# Patient Record
Sex: FEMALE | Race: WHITE | NOT HISPANIC OR LATINO | Employment: OTHER | URBAN - METROPOLITAN AREA
[De-identification: names, ages, dates, MRNs, and addresses within clinical notes are randomized per-mention and may not be internally consistent; named-entity substitution may affect disease eponyms.]

---

## 2017-05-09 ENCOUNTER — HOSPITAL ENCOUNTER (EMERGENCY)
Facility: HOSPITAL | Age: 77
Discharge: HOME | End: 2017-05-09
Attending: EMERGENCY MEDICINE
Payer: MEDICARE

## 2017-05-09 VITALS
SYSTOLIC BLOOD PRESSURE: 165 MMHG | TEMPERATURE: 98 F | WEIGHT: 150 LBS | RESPIRATION RATE: 16 BRPM | HEART RATE: 82 BPM | HEIGHT: 61 IN | DIASTOLIC BLOOD PRESSURE: 70 MMHG | OXYGEN SATURATION: 95 % | BODY MASS INDEX: 28.32 KG/M2

## 2017-05-09 DIAGNOSIS — R10.9 ABDOMINAL PAIN: ICD-10-CM

## 2017-05-09 LAB
ALBUMIN SERPL BCP-MCNC: 3.8 G/DL
ALP SERPL-CCNC: 95 U/L
ALT SERPL W/O P-5'-P-CCNC: 17 U/L
ANION GAP SERPL CALC-SCNC: 10 MMOL/L
AST SERPL-CCNC: 18 U/L
BASOPHILS # BLD AUTO: 0.05 K/UL
BASOPHILS NFR BLD: 0.4 %
BILIRUB SERPL-MCNC: 0.4 MG/DL
BILIRUB UR QL STRIP: NEGATIVE
BUN SERPL-MCNC: 17 MG/DL
CALCIUM SERPL-MCNC: 9.6 MG/DL
CHLORIDE SERPL-SCNC: 106 MMOL/L
CLARITY UR: CLEAR
CO2 SERPL-SCNC: 26 MMOL/L
COLOR UR: YELLOW
CREAT SERPL-MCNC: 0.8 MG/DL
DIFFERENTIAL METHOD: ABNORMAL
EOSINOPHIL # BLD AUTO: 0.2 K/UL
EOSINOPHIL NFR BLD: 1.5 %
ERYTHROCYTE [DISTWIDTH] IN BLOOD BY AUTOMATED COUNT: 13.6 %
EST. GFR  (AFRICAN AMERICAN): >60 ML/MIN/1.73 M^2
EST. GFR  (NON AFRICAN AMERICAN): >60 ML/MIN/1.73 M^2
GLUCOSE SERPL-MCNC: 104 MG/DL
GLUCOSE UR QL STRIP: NEGATIVE
HCT VFR BLD AUTO: 41.4 %
HGB BLD-MCNC: 14 G/DL
HGB UR QL STRIP: ABNORMAL
KETONES UR QL STRIP: NEGATIVE
LACTATE SERPL-SCNC: 1.2 MMOL/L
LEUKOCYTE ESTERASE UR QL STRIP: NEGATIVE
LIPASE SERPL-CCNC: 40 U/L
LYMPHOCYTES # BLD AUTO: 2.4 K/UL
LYMPHOCYTES NFR BLD: 21.4 %
MCH RBC QN AUTO: 29.8 PG
MCHC RBC AUTO-ENTMCNC: 33.8 %
MCV RBC AUTO: 88 FL
MONOCYTES # BLD AUTO: 1.6 K/UL
MONOCYTES NFR BLD: 13.6 %
NEUTROPHILS # BLD AUTO: 7.2 K/UL
NEUTROPHILS NFR BLD: 63.1 %
NITRITE UR QL STRIP: NEGATIVE
PH UR STRIP: 6 [PH] (ref 5–8)
PLATELET # BLD AUTO: 254 K/UL
PMV BLD AUTO: 9.5 FL
POTASSIUM SERPL-SCNC: 3.8 MMOL/L
PROT SERPL-MCNC: 7.4 G/DL
PROT UR QL STRIP: ABNORMAL
RBC # BLD AUTO: 4.7 M/UL
SODIUM SERPL-SCNC: 142 MMOL/L
SP GR UR STRIP: 1.02 (ref 1–1.03)
TROPONIN I SERPL DL<=0.01 NG/ML-MCNC: 0.01 NG/ML
URN SPEC COLLECT METH UR: ABNORMAL
UROBILINOGEN UR STRIP-ACNC: NEGATIVE EU/DL
WBC # BLD AUTO: 11.39 K/UL

## 2017-05-09 PROCEDURE — 96374 THER/PROPH/DIAG INJ IV PUSH: CPT

## 2017-05-09 PROCEDURE — 96375 TX/PRO/DX INJ NEW DRUG ADDON: CPT

## 2017-05-09 PROCEDURE — 93010 ELECTROCARDIOGRAM REPORT: CPT | Mod: ,,, | Performed by: INTERNAL MEDICINE

## 2017-05-09 PROCEDURE — 83690 ASSAY OF LIPASE: CPT

## 2017-05-09 PROCEDURE — 93005 ELECTROCARDIOGRAM TRACING: CPT

## 2017-05-09 PROCEDURE — 84484 ASSAY OF TROPONIN QUANT: CPT

## 2017-05-09 PROCEDURE — 80053 COMPREHEN METABOLIC PANEL: CPT

## 2017-05-09 PROCEDURE — 63600175 PHARM REV CODE 636 W HCPCS: Performed by: EMERGENCY MEDICINE

## 2017-05-09 PROCEDURE — 85025 COMPLETE CBC W/AUTO DIFF WBC: CPT

## 2017-05-09 PROCEDURE — 83605 ASSAY OF LACTIC ACID: CPT

## 2017-05-09 PROCEDURE — 25500020 PHARM REV CODE 255: Performed by: EMERGENCY MEDICINE

## 2017-05-09 PROCEDURE — 99285 EMERGENCY DEPT VISIT HI MDM: CPT | Mod: 25

## 2017-05-09 PROCEDURE — 81003 URINALYSIS AUTO W/O SCOPE: CPT

## 2017-05-09 RX ORDER — ONDANSETRON 2 MG/ML
INJECTION INTRAMUSCULAR; INTRAVENOUS
Status: DISPENSED
Start: 2017-05-09 | End: 2017-05-10

## 2017-05-09 RX ORDER — MORPHINE SULFATE 2 MG/ML
INJECTION, SOLUTION INTRAMUSCULAR; INTRAVENOUS
Status: DISPENSED
Start: 2017-05-09 | End: 2017-05-10

## 2017-05-09 RX ORDER — HYDROCODONE BITARTRATE AND ACETAMINOPHEN 7.5; 325 MG/1; MG/1
1 TABLET ORAL EVERY 4 HOURS PRN
Qty: 12 TABLET | Refills: 0 | Status: SHIPPED | OUTPATIENT
Start: 2017-05-09 | End: 2017-06-08

## 2017-05-09 RX ORDER — ONDANSETRON 2 MG/ML
4 INJECTION INTRAMUSCULAR; INTRAVENOUS
Status: COMPLETED | OUTPATIENT
Start: 2017-05-09 | End: 2017-05-09

## 2017-05-09 RX ORDER — MORPHINE SULFATE 2 MG/ML
2 INJECTION, SOLUTION INTRAMUSCULAR; INTRAVENOUS
Status: COMPLETED | OUTPATIENT
Start: 2017-05-09 | End: 2017-05-09

## 2017-05-09 RX ORDER — ONDANSETRON 4 MG/1
4 TABLET, FILM COATED ORAL EVERY 8 HOURS PRN
Qty: 12 TABLET | Refills: 0 | Status: SHIPPED | OUTPATIENT
Start: 2017-05-09 | End: 2017-05-25

## 2017-05-09 RX ADMIN — IOHEXOL 100 ML: 350 INJECTION, SOLUTION INTRAVENOUS at 09:05

## 2017-05-09 RX ADMIN — ONDANSETRON 4 MG: 2 INJECTION INTRAMUSCULAR; INTRAVENOUS at 08:05

## 2017-05-09 RX ADMIN — MORPHINE SULFATE 2 MG: 2 INJECTION, SOLUTION INTRAMUSCULAR; INTRAVENOUS at 08:05

## 2017-05-09 NOTE — ED AVS SNAPSHOT
[facility] [address] [place] [address]               Leslie Wood   2017  7:53 PM   ED    Description:  Female : 1940   Department:  [facility]           Your Care was Coordinated By:     Provider Role From To    Saira Jimenez MD Attending Provider 17 --      Reason for Visit     Abdominal Pain           Diagnoses this Visit        Comments    Abdominal pain         Abdominal pain         Abdominal pain           ED Disposition     ED Disposition Condition Comment    Discharge             To Do List           Follow-up Information     Follow up with Adi Almeida MD In 1 day.    Specialty:  Internal Medicine    Contact information:    [address] [facility] [id number] [phone]          Follow up with [facility].    Specialty:  Emergency Medicine    Why:  As needed, If symptoms worsen    Contact information:    [address] [place] [address] [phone]       These Medications        Disp Refills Start End    hydrocodone-acetaminophen 7.5-325mg (NORCO) 7.5-325 mg per tablet 12 tablet 0 2017     Take 1 tablet by mouth every 4 (four) hours as needed for Pain. - Oral    ondansetron (ZOFRAN) 4 MG tablet 12 tablet 0 2017     Take 1 tablet (4 mg total) by mouth every 8 (eight) hours as needed for Nausea. - Oral      [facility]     [facility] Care [facility] - 24/7 Assistance  Unless otherwise directed by your provider, please contact [facility], our nurse care line that is available for 24/7 assistance.     Registered nurses in the [facility] provide: appointment scheduling, clinical advisement, health education, and other advisory services.  Call: [phone] (toll free)               Medications           Message regarding Medications     Verify the changes and/or additions to your medication regime listed below are the  same as discussed with your clinician today.  If any of these changes or additions are incorrect, please notify your healthcare provider.        START taking these NEW medications        Refills    hydrocodone-acetaminophen 7.5-325mg (NORCO) 7.5-325 mg per tablet 0    Sig: Take 1 tablet by mouth every 4 (four) hours as needed for Pain.    Class: Print    Route: Oral    ondansetron (ZOFRAN) 4 MG tablet 0    Sig: Take 1 tablet (4 mg total) by mouth every 8 (eight) hours as needed for Nausea.    Class: Print    Route: Oral      These medications were administered today        Dose Freq    morphine injection 2 mg 2 mg ED 1 Time    Sig: Inject 1 mL (2 mg total) into the vein ED 1 Time.    Class: Normal    Route: Intravenous    ondansetron injection 4 mg 4 mg ED 1 Time    Sig: Inject 4 mg into the vein ED 1 Time.    Class: Normal    Route: Intravenous    omnipaque 350 iohexol 100 mL 100 mL IMG once as needed    Sig: Inject 100 mLs into the vein ONCE PRN for contrast.    Class: Normal    Route: Intravenous      STOP taking these medications     hydrocodone-acetaminophen 10-325mg (NORCO)  mg Tab Take by mouth.           Verify that the below list of medications is an accurate representation of the medications you are currently taking.  If none reported, the list may be blank. If incorrect, please contact your healthcare provider. Carry this list with you in case of emergency.           Current Medications     hydrocodone-acetaminophen 7.5-325mg (NORCO) 7.5-325 mg per tablet Take 1 tablet by mouth every 4 (four) hours as needed for Pain.    lisinopril (PRINIVIL,ZESTRIL) 20 MG tablet Take 20 mg by mouth once daily.    methocarbamol (ROBAXIN) 750 MG Tab Take 500 mg by mouth 4 (four) times daily.    ondansetron (ZOFRAN) 4 MG tablet Take 1 tablet (4 mg total) by mouth every 8 (eight) hours as needed for Nausea.    tizanidine (ZANAFLEX) 4 MG tablet Take 4 mg by mouth every 6 (six) hours as needed.           Clinical  "Reference Information           Your Vitals Were     BP Pulse Temp Resp Height Weight    153/61 80 98.1 °F (36.7 °C) (Oral) 13 5' 1" (1.549 m) 68 kg (150 lb)    SpO2 BMI             94% 28.34 kg/m2         Allergies as of 5/9/2017        Reactions    Percocet [Oxycodone-acetaminophen] Other (See Comments)    Seizures    Codeine Nausea Only, Rash      Immunizations Administered on Date of Encounter - 5/9/2017     None      ED Micro, Lab, POCT     Start Ordered       Status Ordering Provider    05/09/17 2015 05/09/17 2014  CBC auto differential  STAT      Final result     05/09/17 2015 05/09/17 2014  Comprehensive metabolic panel  STAT      Final result     05/09/17 2015 05/09/17 2014  Lactic acid, plasma  STAT      Final result     05/09/17 2015 05/09/17 2014  Urinalysis  STAT      Final result     05/09/17 2015 05/09/17 2014  Troponin I  STAT      Final result     05/09/17 2015 05/09/17 2014  Lipase  STAT      Final result       ED Imaging Orders     Start Ordered       Status Ordering Provider    05/09/17 2124 05/09/17 2124  CTA Abdomen  1 time imaging      Final result     05/09/17 2015 05/09/17 2014  X-Ray Chest 1 View  1 time imaging      Final result     05/09/17 2015 05/09/17 2014  X-Ray Abdomen Flat And Erect  1 time imaging      Final result         Discharge Instructions         Abdominal Pain    Abdominal pain is pain in the stomach or belly area. Everyone has this pain from time to time. In many cases it goes away on its own. But abdominal pain can sometimes be due to a serious problem, such as appendicitis. So its important to know when to seek help.  Causes of abdominal pain  There are many possible causes of abdominal pain. Common causes in adults include:  · Constipation, diarrhea, or gas  · Stomach acid flowing back up into the esophagus (acid reflux or heartburn)  · Severe acid reflux, called GERD (gastroesophageal reflux disease)  · A sore in the lining of the stomach or small intestine (peptic " ulcer)  · Inflammation of the gallbladder, liver, or pancreas  · Gallstones or kidney stones  · Appendicitis   · Intestinal blockage   · An internal organ pushing through a muscle or other tissue (hernia)  · Urinary tract infections  · In women, menstrual cramps, fibroids, or endometriosis  · Inflammation or infection of the intestines  Diagnosing the cause of abdominal pain  Your healthcare provider will do a physical exam help find the cause of your pain. If needed, tests will be ordered. Belly pain has many possible causes. So it can be hard to find the reason for your pain. Giving details about your pain can help. Tell your provider where and when you feel the pain, and what makes it better or worse. Also let your provider know if you have other symptoms such as:  · Fever  · Tiredness  · Upset stomach (nausea)  · Vomiting  · Changes in bathroom habits  Treating abdominal pain  Some causes of pain need emergency medical treatment right away. These include appendicitis or a bowel blockage. Other problems can be treated with rest, fluids, or medicines. Your healthcare provider can give you specific instructions for treatment or self-care based on what is causing your pain.  If you have vomiting or diarrhea, sip water or other clear fluids. When you are ready to eat solid foods again, start with small amounts of easy-to-digest, low-fat foods. These include apple sauce, toast, or crackers.   When to seek medical care  Call 911 or go to the hospital right away if you:  · Cant pass stool and are vomiting  · Are vomiting blood or have bloody diarrhea or black, tarry diarrhea  · Have chest, neck, or shoulder pain  · Feel like you might pass out  · Have pain in your shoulder blades with nausea  · Have sudden, severe belly pain  · Have new, severe pain unlike any you have felt before  · Have a belly that is rigid, hard, and tender to touch  Call your healthcare provider if you have:  · Pain for more  than 5 days  · Bloating for more than 2 days  · Diarrhea for more than 5 days  · A fever of 100.4°F (38.0°C) or higher, or as directed by your provider  · Pain that gets worse  · Weight loss for no reason  · Continued lack of appetite  · Blood in your stool  How to prevent abdominal pain  Here are some tips to help prevent abdominal pain:  · Eat smaller amounts of food at one time.  · Avoid greasy, fried, or other high-fat foods.  · Avoid foods that give you gas.  · Exercise regularly.  · Drink plenty of fluids.  To help prevent GERD symptoms:  · Quit smoking.  · Reduce alcohol and certain foods that increase stomach acid.  · Avoid aspirin and over-the-counter pain and fever medicines (NSAIDS or nonsteroidal anti-inflammatory drugs), if possible  · Lose extra weight.  · Finish eating at least 2 hours before you go to bed or lie down.  · Raise the head of your bed.  Date Last Reviewed: 7/1/2016  © 8455-6065 [employer]. [address] [place] [address]. All rights reserved. This information is not intended as a substitute for professional medical care. Always follow your healthcare professional's instructions.          [facility] Sign-Up     Activating your [facility] account is as easy as 1-2-3!     1) Visit [email or web address], select Sign Up Now, enter this activation code and your date of birth, then select Next.  [email or web address]  Expires: 6/23/2017 10:28 PM      2) Create a username and password to use when you visit [facility] in the future and select a security question in case you lose your password and select Next.    3) Enter your e-mail address and click Sign Up!    Additional Information  If you have questions, please e-mail [email or web address] or call [phone] to talk to our [facility] staff. Remember, [facility] is NOT to be used for urgent needs. For medical emergencies, dial 911.          [facility] complies with applicable Federal civil rights laws and does not  discriminate on the basis of race, color, national origin, age, disability, or sex.        Language Assistance Services     ATTENTION: Language assistance services are available, free of charge. Please call [phone].      ATENCIÓN: Si nadia dave, tiene a montague disposición servicios gratuitos de asistencia lingüística. Llame al [phone].     CHÚ Ý: N?u b?n nói Ti?ng Vi?t, có các d?ch v? h? tr? ngôn ng? mi?n phí dành cho b?n. G?i s? [phone].

## 2017-05-10 ENCOUNTER — HOSPITAL ENCOUNTER (OUTPATIENT)
Dept: RADIOLOGY | Facility: HOSPITAL | Age: 77
Discharge: HOME | End: 2017-05-10
Attending: FAMILY MEDICINE
Payer: MEDICARE

## 2017-05-10 ENCOUNTER — OFFICE VISIT (OUTPATIENT)
Dept: FAMILY MEDICINE | Facility: CLINIC | Age: 77
End: 2017-05-10
Payer: MEDICARE

## 2017-05-10 VITALS
DIASTOLIC BLOOD PRESSURE: 80 MMHG | SYSTOLIC BLOOD PRESSURE: 130 MMHG | RESPIRATION RATE: 14 BRPM | TEMPERATURE: 101 F | HEART RATE: 89 BPM | OXYGEN SATURATION: 95 % | HEIGHT: 61 IN | BODY MASS INDEX: 28.7 KG/M2 | WEIGHT: 152 LBS

## 2017-05-10 DIAGNOSIS — R06.02 SHORTNESS OF BREATH: ICD-10-CM

## 2017-05-10 DIAGNOSIS — R10.9 LEFT SIDED ABDOMINAL PAIN: Primary | ICD-10-CM

## 2017-05-10 DIAGNOSIS — R50.9 FEVER, UNSPECIFIED FEVER CAUSE: ICD-10-CM

## 2017-05-10 PROCEDURE — 71020 XR CHEST PA AND LATERAL: CPT | Mod: 26,,, | Performed by: RADIOLOGY

## 2017-05-10 PROCEDURE — 99213 OFFICE O/P EST LOW 20 MIN: CPT | Mod: PBBFAC,25,PO | Performed by: FAMILY MEDICINE

## 2017-05-10 PROCEDURE — 99999 PR PBB SHADOW E&M-EST. PATIENT-LVL III: CPT | Mod: PBBFAC,,, | Performed by: FAMILY MEDICINE

## 2017-05-10 PROCEDURE — 71020 XR CHEST PA AND LATERAL: CPT | Mod: TC,PO

## 2017-05-10 PROCEDURE — 99203 OFFICE O/P NEW LOW 30 MIN: CPT | Mod: S$PBB,,, | Performed by: FAMILY MEDICINE

## 2017-05-10 RX ORDER — PANTOPRAZOLE SODIUM 40 MG/1
40 TABLET, DELAYED RELEASE ORAL DAILY
COMMUNITY
Start: 2017-05-08 | End: 2019-06-25

## 2017-05-10 RX ORDER — AMLODIPINE BESYLATE 10 MG/1
10 TABLET ORAL DAILY
COMMUNITY
Start: 2017-04-12 | End: 2017-05-25 | Stop reason: SDUPTHER

## 2017-05-10 NOTE — ED NOTES
NAD noted. AAO x 3. RR e/u, airway open and patent. MD Jimenez notified of BP. MD states to continue with discharge.

## 2017-05-10 NOTE — ED NOTES
Pt reports significant abdominal relief at this time. NAD noted. VSS. RR e/u, airway open and patent. Will continue to monitor.

## 2017-05-10 NOTE — MR AVS SNAPSHOT
[facility]  [address]  [facility] [address]  Phone: [phone]  Fax: [phone]                  Leslie Wood   5/10/2017 3:00 PM   Office Visit    Description:  Female : 1940   Provider:  Felicia Fox MD   Department:  [facility]           Reason for Visit     Abdominal Pain           Diagnoses this Visit        Comments    Left sided abdominal pain    -  Primary     Shortness of breath         Fever, unspecified fever cause                To Do List           Future Appointments        Provider Department Dept Phone    5/10/2017 3:50 PM [facility], [facility]     [facility] [phone]    5/15/2017 8:20 AM Angie Renae NP [facility] [phone]      Goals (5 Years of Data)     None      [facility]     [facility] Nurse Care Line -  Assistance  Unless otherwise directed by your provider, please contact [facility], our nurse care line that is available for  assistance.     Registered nurses in the [facility] provide: appointment scheduling, clinical advisement, health education, and other advisory services.  Call: [phone] (toll free)               Medications           Message regarding Medications     Verify the changes and/or additions to your medication regime listed below are the same as discussed with your clinician today.  If any of these changes or additions are incorrect, please notify your healthcare provider.        STOP taking these medications     methocarbamol (ROBAXIN) 750 MG Tab Take 500 mg by mouth 4 (four) times daily.           Verify that the below list of medications is an accurate representation of the medications you are currently taking.  If none reported, the list may be blank. If incorrect, please contact your healthcare provider. Carry this list with you in case of  "emergency.           Current Medications     amlodipine (NORVASC) 10 MG tablet     hydrocodone-acetaminophen 7.5-325mg (NORCO) 7.5-325 mg per tablet Take 1 tablet by mouth every 4 (four) hours as needed for Pain.    lisinopril (PRINIVIL,ZESTRIL) 20 MG tablet Take 20 mg by mouth once daily.    pantoprazole (PROTONIX) 40 MG tablet     tizanidine (ZANAFLEX) 4 MG tablet Take 4 mg by mouth every 6 (six) hours as needed.    ondansetron (ZOFRAN) 4 MG tablet Take 1 tablet (4 mg total) by mouth every 8 (eight) hours as needed for Nausea.           Clinical Reference Information           Your Vitals Were     BP Pulse Temp Resp Height Weight    130/80 89 101.1 °F (38.4 °C) (Temporal) 14 5' 1" (1.549 m) 68.9 kg (152 lb)    SpO2 BMI             95% 28.72 kg/m2         Blood Pressure          Most Recent Value    BP  130/80      Allergies as of 5/10/2017     Percocet [Oxycodone-acetaminophen]    Codeine      Immunizations Administered on Date of Encounter - 5/10/2017     None      Orders Placed During Today's Visit     Future Labs/Procedures Expected by Expires    Amylase  5/10/2017 7/9/2018    CBC auto differential  5/10/2017 7/9/2018    Comprehensive metabolic panel  5/10/2017 7/9/2018    D dimer, quantitative  5/10/2017 7/9/2018    Lipase  5/10/2017 7/9/2018    X-Ray Chest PA And Lateral  5/10/2017 5/10/2018      [facility] Sign-Up     Activating your [facility] account is as easy as 1-2-3!     1) Visit [email or web address], select Sign Up Now, enter this activation code and your date of birth, then select Next.  [email or web address]  Expires: 6/23/2017 10:28 PM      2) Create a username and password to use when you visit [facility] in the future and select a security question in case you lose your password and select Next.    3) Enter your e-mail address and click Sign Up!    Additional Information  If you have questions, please e-mail [email or web address] or call [phone] to talk to our [facility] staff. Remember, [facility] is NOT " to be used for urgent needs. For medical emergencies, dial 911.         Language Assistance Services     ATTENTION: Language assistance services are available, free of charge. Please call [phone].      ATENCIÓN: Si nadia dave, tiene a montague disposición servicios gratuitos de asistencia lingüística. Llame al [phone].     CHÚ Ý: N?u b?n nói Ti?ng Vi?t, có các d?ch v? h? tr? ngôn ng? mi?n phí dành cho b?n. G?i s? [phone].         [facility] complies with applicable Federal civil rights laws and does not discriminate on the basis of race, color, national origin, age, disability, or sex.

## 2017-05-10 NOTE — ED PROVIDER NOTES
SCRIBE #1 NOTE: I, Becca Todd, am scribing for, and in the presence of, Siara Jimenez MD. I have scribed the entire note.      History      Chief Complaint   Patient presents with    Abdominal Pain     Pt reports LUQ pain x2 days, also reports subjective fever. Seen at PCP, rx'd protonix. Worsening pain since then. Denies N/V/D.       Review of patient's allergies indicates:   Allergen Reactions    Percocet [oxycodone-acetaminophen] Other (See Comments)     Seizures    Codeine Nausea Only and Rash        HPI   HPI    5/9/2017, 8:01 PM   History obtained from the patient      History of Present Illness: Leslie Wood is a 76 y.o. female patient who presents to the Emergency Department for abd pain which onset gradually 2 days ago and has progressively worsened. Sxs are constant and moderate in severity. Pain located to epigastric region and extends to LUQ abdomen. LBM this morning and reported to be normal . Pain exacerbated with movement and respiration. There are no other mitigating or exacerbating factors noted. Associated sxs include fever (tmax 101), chills,and SOB.  Pt denies any nausea, vomiting, diarrhea, dysuria, hematuria, constipation, blood in stool, CP, cough, and all other sxs at this time. Pt seen by PCP 1 day ago for these sxs, blood work drawn with no acute findings. Pt prescribed Protonix with no relief of sxs.  No further complaints or concerns at this time.     Arrival mode: Personal vehicle     PCP: Adi Almeida MD       Past Medical History:  Past Medical History:   Diagnosis Date    Diabetes mellitus     controled with diet    Hypertension        Past Surgical History:  Past Surgical History:   Procedure Laterality Date    BACK SURGERY      fusion l 4-5 s 1,2,3  fusion l 2-3         Family History:  No family history on file.    Social History:  Social History     Social History Main Topics    Smoking status: Never Smoker    Smokeless tobacco: Not on file    Alcohol use No     Drug use: No    Sexual activity: Not on file       ROS   Review of Systems   Constitutional: Positive for chills and fever.   HENT: Negative for sore throat.    Respiratory: Positive for shortness of breath. Negative for cough.    Cardiovascular: Negative for chest pain.   Gastrointestinal: Positive for abdominal pain. Negative for blood in stool, constipation, diarrhea, nausea and vomiting.   Genitourinary: Negative for dysuria.   Musculoskeletal: Negative for back pain.   Skin: Negative for rash.   Neurological: Negative for weakness.   Hematological: Does not bruise/bleed easily.       Physical Exam    Initial Vitals   BP Pulse Resp Temp SpO2   05/09/17 1951 05/09/17 1951 05/09/17 1951 05/09/17 1951 05/09/17 1951   184/73 86 22 98.1 °F (36.7 °C) 96 %      Physical Exam  Nursing Notes and Vital Signs Reviewed.  Constitutional: Patient is in no acute distress. Awake and alert. Well-developed and well-nourished.  Head: Atraumatic. Normocephalic.  Eyes: PERRL. EOM intact. Conjunctivae are not pale. No scleral icterus.  ENT: Mucous membranes are moist. Oropharynx is clear and symmetric.    Neck: Supple. Full ROM. No lymphadenopathy.  Cardiovascular: Regular rate. Regular rhythm. No murmurs, rubs, or gallops. Distal pulses are 2+ and symmetric.  Pulmonary/Chest: No respiratory distress. Clear to auscultation bilaterally. No wheezing, rales, or rhonchi.  Abdominal:  Soft and non-distended.  There is epigastric/LUQ tenderness.  No rebound, guarding, or rigidity.  No organomegaly. No pulsatile mass. Normal bowel sounds.  Genitourinary: No CVA tenderness  Musculoskeletal: Moves all extremities. No obvious deformities. No edema. No calf tenderness.  Skin: Warm and dry.  Neurological:  Alert, awake, and appropriate.  Normal speech.  No acute focal neurological deficits are appreciated.  Psychiatric: Normal affect. Good eye contact. Appropriate in content.    ED Course    Procedures  ED Vital Signs:  Vitals:    05/09/17 1951  "05/09/17 2025 05/09/17 2047 05/09/17 2107   BP: (!) 184/73  (!) 160/60 (!) 153/61   Pulse: 86 81 79 80   Resp: (!) 22  13 13   Temp: 98.1 °F (36.7 °C)      TempSrc: Oral      SpO2: 96%  (!) 94% (!) 94%   Weight: 68 kg (150 lb)      Height: 5' 1" (1.549 m)       05/09/17 2237   BP: (!) 165/70   Pulse: 82   Resp: 16   Temp: 98.2 °F (36.8 °C)   TempSrc: Oral   SpO2: 95%   Weight:    Height:        Abnormal Lab Results:  Labs Reviewed   CBC W/ AUTO DIFFERENTIAL - Abnormal; Notable for the following:        Result Value    Mono # 1.6 (*)     All other components within normal limits   URINALYSIS - Abnormal; Notable for the following:     Protein, UA Trace (*)     Occult Blood UA Trace (*)     All other components within normal limits   COMPREHENSIVE METABOLIC PANEL   LACTIC ACID, PLASMA   TROPONIN I   LIPASE        All Lab Results:  Results for orders placed or performed during the hospital encounter of 05/09/17   CBC auto differential   Result Value Ref Range    WBC 11.39 3.90 - 12.70 K/uL    RBC 4.70 4.00 - 5.40 M/uL    Hemoglobin 14.0 12.0 - 16.0 g/dL    Hematocrit 41.4 37.0 - 48.5 %    MCV 88 82 - 98 fL    MCH 29.8 27.0 - 31.0 pg    MCHC 33.8 32.0 - 36.0 %    RDW 13.6 11.5 - 14.5 %    Platelets 254 150 - 350 K/uL    MPV 9.5 9.2 - 12.9 fL    Gran # 7.2 1.8 - 7.7 K/uL    Lymph # 2.4 1.0 - 4.8 K/uL    Mono # 1.6 (H) 0.3 - 1.0 K/uL    Eos # 0.2 0.0 - 0.5 K/uL    Baso # 0.05 0.00 - 0.20 K/uL    Gran% 63.1 38.0 - 73.0 %    Lymph% 21.4 18.0 - 48.0 %    Mono% 13.6 4.0 - 15.0 %    Eosinophil% 1.5 0.0 - 8.0 %    Basophil% 0.4 0.0 - 1.9 %    Differential Method Automated    Comprehensive metabolic panel   Result Value Ref Range    Sodium 142 136 - 145 mmol/L    Potassium 3.8 3.5 - 5.1 mmol/L    Chloride 106 95 - 110 mmol/L    CO2 26 23 - 29 mmol/L    Glucose 104 70 - 110 mg/dL    BUN, Bld 17 8 - 23 mg/dL    Creatinine 0.8 0.5 - 1.4 mg/dL    Calcium 9.6 8.7 - 10.5 mg/dL    Total Protein 7.4 6.0 - 8.4 g/dL    Albumin 3.8 3.5 - " 5.2 g/dL    Total Bilirubin 0.4 0.1 - 1.0 mg/dL    Alkaline Phosphatase 95 55 - 135 U/L    AST 18 10 - 40 U/L    ALT 17 10 - 44 U/L    Anion Gap 10 8 - 16 mmol/L    eGFR if African American >60 >60 mL/min/1.73 m^2    eGFR if non African American >60 >60 mL/min/1.73 m^2   Lactic acid, plasma   Result Value Ref Range    Lactate (Lactic Acid) 1.2 0.5 - 2.2 mmol/L   Urinalysis   Result Value Ref Range    Specimen UA Urine, Catheterized     Color, UA Yellow Yellow, Straw, Izabela    Appearance, UA Clear Clear    pH, UA 6.0 5.0 - 8.0    Specific Gravity, UA 1.020 1.005 - 1.030    Protein, UA Trace (A) Negative    Glucose, UA Negative Negative    Ketones, UA Negative Negative    Bilirubin (UA) Negative Negative    Occult Blood UA Trace (A) Negative    Nitrite, UA Negative Negative    Urobilinogen, UA Negative <2.0 EU/dL    Leukocytes, UA Negative Negative   Troponin I   Result Value Ref Range    Troponin I 0.012 0.000 - 0.026 ng/mL   Lipase   Result Value Ref Range    Lipase 40 4 - 60 U/L         Imaging Results:  Imaging Results         CTA Abdomen (Final result) Result time:  05/09/17 22:10:38    Final result by Daniel Baum MD (05/09/17 22:10:38)    Impression:        Scattered atherosclerotic calcifications abdominal aorta and proximal iliac vessels.  No significant stenosis of the celiac axis, superior mesenteric artery, inferior mesenteric artery, or the renal arteries.  Large simple appearing superior pole and inferior pole left renal cyst.  Scattered sigmoidal diverticula.  No radiographic evidence of diverticulitis.   No abnormality identified within the chest.        All CT scans at this facility use dose modulation, iterative reconstruction, and/or weight based dosing when appropriate to reduce radiation dose to as low as reasonably achievable.      Electronically signed by: DANIEL BAUM MD  Date:     05/09/17  Time:    22:10     Narrative:    Exam: CTA abdomen with intravenous contrast.    Clinical History:    abdominal pain.      Technique: Axial CTA images performed through the abdomen after the administration of 100 cc intravenous contrast. Maximum intensity projections were performed and interpreted.    Findings:        Scattered atherosclerotic calcifications of the nondilated abdominal aorta and of the iliac arteries bilaterally. Flow identified in the JACKELINE. Both renal arteries fill normally. Normal flow identified in the superior mesenteric artery.    Normal flow identified in the celiac artery. Remote abdominal wall hernia repair.    Remote lumbar fusion procedure and vertebral plasty at T10    No significant vascular stenosis identified.            X-Ray Chest 1 View (Final result) Result time:  05/09/17 21:10:20    Final result by Daniel Baum MD (05/09/17 21:10:20)    Impression:     No acute cardiopulmonary disease.      Electronically signed by: DANIEL BAUM MD  Date:     05/09/17  Time:    21:10     Narrative:    Exam: Portable chest radiograph    Clinical History:  R10.9 Unspecified abdominal pain .    Findings:     The lungs are clear. The cardiac silhouette is within normal limits.  Epidural leads the midthoracic region.  Remote surgery at the gastroesophageal junction.            X-Ray Abdomen Flat And Erect (Final result) Result time:  05/09/17 21:11:40    Final result by Daniel Baum MD (05/09/17 21:11:40)    Impression:             No acute abdominal findings.        Electronically signed by: DANIEL BAUM MD  Date:     05/09/17  Time:    21:11     Narrative:    Exam: Abdomen, 2 views.    Clinical History:   R10.9 Unspecified abdominal pain .    Findings:    The lung bases are clear.  Remote surgery at the region the gastroesophageal junction.  Remote T11 vertebroplasty.  Remote lumbar fusion.  Nonspecific abdominal gas pattern without obstruction or free air.  Loops in the right upper quadrant consistent with remote cholecystectomy.  Remote abdominal hernia repair.   .             The EKG was ordered,  reviewed, and independently interpreted by the ED provider.  Interpretation time: 20:25  Rate: 81 BPM  Rhythm: normal sinus rhythm  Interpretation:  No STEMI.           The Emergency Provider reviewed the vital signs and test results, which are outlined above.    ED Discussion     9:23 PM: Re-evaluated pt. Pt is resting comfortably and is in no acute distress.  Pt states pain has improved with medication but has not completely resolved. Will order CT abdomen.  D/w pt all pertinent results. D/w pt any concerns expressed at this time. Answered all questions. Pt expresses understanding at this time.    10:29 PM: Reassessed pt at this time. Discussed with pt all pertinent ED information and results. Discussed pt dx and plan of tx. Gave pt all f/u and return to the ED instructions. All questions and concerns were addressed at this time. Pt expresses understanding of information and instructions, and is comfortable with plan to discharge. Pt is stable for discharge    I discussed with patient and/or family/caretaker that evaluation in the ED does not suggest any emergent or life threatening medical conditions requiring immediate intervention beyond what was provided in the ED, and I believe patient is safe for discharge.  Regardless, an unremarkable evaluation in the ED does not preclude the development or presence of a serious of life threatening condition. As such, patient was instructed to return immediately for any worsening or change in current symptoms.    Pre-hypertension/Hypertension: The pt has been informed that they may have pre-hypertension or hypertension based on a blood pressure reading in the ED. I recommend that the pt call the PCP listed on their discharge instructions or a physician of their choice this week to arrange f/u for further evaluation of possible pre-hypertension or hypertension.       ED Medication(s):  Medications   morphine injection 2 mg (2 mg Intravenous Given 5/9/17 2033)   ondansetron  injection 4 mg (4 mg Intravenous Given 5/9/17 2033)   omnipaque 350 iohexol 100 mL (100 mLs Intravenous Given 5/9/17 2152)       Discharge Medication List as of 5/9/2017 10:28 PM      START taking these medications    Details   hydrocodone-acetaminophen 7.5-325mg (NORCO) 7.5-325 mg per tablet Take 1 tablet by mouth every 4 (four) hours as needed for Pain., Starting 5/9/2017, Until Discontinued, Print      ondansetron (ZOFRAN) 4 MG tablet Take 1 tablet (4 mg total) by mouth every 8 (eight) hours as needed for Nausea., Starting 5/9/2017, Until Discontinued, Print             Follow-up Information     Follow up with Adi Almeida MD In 1 day.    Specialty:  Internal Medicine    Contact information:    [address] [facility] [address] [phone]          Follow up with [facility] - .    Specialty:  Emergency Medicine    Why:  As needed, If symptoms worsen    Contact information:    [address] [facility] [address] [phone]            Medical Decision Making    Medical Decision Making:   Clinical Tests:   Lab Tests: Ordered and Reviewed  Radiological Study: Reviewed and Ordered           Scribe Attestation:   Scribe #1: I performed the above scribed service and the documentation accurately describes the services I performed. I attest to the accuracy of the note.    Attending:   Physician Attestation Statement for Scribe #1: I, Saira Jimenez MD, personally performed the services described in this documentation, as scribed by Becca Todd, in my presence, and it is both accurate and complete.          Clinical Impression       ICD-10-CM ICD-9-CM   1. Abdominal pain R10.9 789.00   2. Abdominal pain R10.9 789.00   3. Abdominal pain R10.9 789.00       Disposition:   Disposition: Discharged  Condition: Stable         Saira Jimenez MD  05/10/17 7414

## 2017-05-10 NOTE — DISCHARGE INSTRUCTIONS
Abdominal Pain    Abdominal pain is pain in the stomach or belly area. Everyone has this pain from time to time. In many cases it goes away on its own. But abdominal pain can sometimes be due to a serious problem, such as appendicitis. So its important to know when to seek help.  Causes of abdominal pain  There are many possible causes of abdominal pain. Common causes in adults include:  · Constipation, diarrhea, or gas  · Stomach acid flowing back up into the esophagus (acid reflux or heartburn)  · Severe acid reflux, called GERD (gastroesophageal reflux disease)  · A sore in the lining of the stomach or small intestine (peptic ulcer)  · Inflammation of the gallbladder, liver, or pancreas  · Gallstones or kidney stones  · Appendicitis   · Intestinal blockage   · An internal organ pushing through a muscle or other tissue (hernia)  · Urinary tract infections  · In women, menstrual cramps, fibroids, or endometriosis  · Inflammation or infection of the intestines  Diagnosing the cause of abdominal pain  Your healthcare provider will do a physical exam help find the cause of your pain. If needed, tests will be ordered. Belly pain has many possible causes. So it can be hard to find the reason for your pain. Giving details about your pain can help. Tell your provider where and when you feel the pain, and what makes it better or worse. Also let your provider know if you have other symptoms such as:  · Fever  · Tiredness  · Upset stomach (nausea)  · Vomiting  · Changes in bathroom habits  Treating abdominal pain  Some causes of pain need emergency medical treatment right away. These include appendicitis or a bowel blockage. Other problems can be treated with rest, fluids, or medicines. Your healthcare provider can give you specific instructions for treatment or self-care based on what is causing your pain.  If you have vomiting or diarrhea, sip water or other clear fluids. When you are ready to eat solid foods again,  start with small amounts of easy-to-digest, low-fat foods. These include apple sauce, toast, or crackers.   When to seek medical care  Call 911 or go to the hospital right away if you:  · Cant pass stool and are vomiting  · Are vomiting blood or have bloody diarrhea or black, tarry diarrhea  · Have chest, neck, or shoulder pain  · Feel like you might pass out  · Have pain in your shoulder blades with nausea  · Have sudden, severe belly pain  · Have new, severe pain unlike any you have felt before  · Have a belly that is rigid, hard, and tender to touch  Call your healthcare provider if you have:  · Pain for more than 5 days  · Bloating for more than 2 days  · Diarrhea for more than 5 days  · A fever of 100.4°F (38.0°C) or higher, or as directed by your provider  · Pain that gets worse  · Weight loss for no reason  · Continued lack of appetite  · Blood in your stool  How to prevent abdominal pain  Here are some tips to help prevent abdominal pain:  · Eat smaller amounts of food at one time.  · Avoid greasy, fried, or other high-fat foods.  · Avoid foods that give you gas.  · Exercise regularly.  · Drink plenty of fluids.  To help prevent GERD symptoms:  · Quit smoking.  · Reduce alcohol and certain foods that increase stomach acid.  · Avoid aspirin and over-the-counter pain and fever medicines (NSAIDS or nonsteroidal anti-inflammatory drugs), if possible  · Lose extra weight.  · Finish eating at least 2 hours before you go to bed or lie down.  · Raise the head of your bed.  Date Last Reviewed: 7/1/2016  © 1963-4461 [employer]. [address] [place] [address], [place] [address]. All rights reserved. This information is not intended as a substitute for professional medical care. Always follow your healthcare professional's instructions.

## 2017-05-10 NOTE — PROGRESS NOTES
Subjective:       Patient ID: Leslie Wood is a 76 y.o. female.    Chief Complaint: Abdominal Pain and Shortness of Breath    Abdominal Pain   This is a new problem. The current episode started in the past 7 days. The onset quality is sudden. The problem occurs constantly. The pain is located in the epigastric region. The pain is at a severity of 7/10. The pain is moderate. The abdominal pain radiates to the LUQ. Associated symptoms include a fever. Pertinent negatives include no arthralgias, constipation, diarrhea, dysuria, headaches, myalgias, nausea or vomiting. She has tried proton pump inhibitors for the symptoms. Prior diagnostic workup includes CT scan.   Patient notes acute onset of symptoms this weekend. She was evaluated on Monday by her PCP and Protonix was initiated. She notes that symptoms progressed and yesterday symptoms intensified prompting her to seek assessment in the ER overnight. A complete workup was performed to include CTA of the abdomen, CXR, EKG and labs. There were no significant abnormal findings identified. Earlier today she was assessed by the PA at her GI specialist's office. Recommendation was made to double PPI use. Patient is currently febrile. Pain is significant along the left side of the abdomen. She describes significant shortness of breath from baseline. No known cardiac disease. No history of DVT or PE.    Past Medical History:   Diagnosis Date    Arthritis     Cataract     Diabetes mellitus     controled with diet    GERD (gastroesophageal reflux disease)     Hypertension      Past Surgical History:   Procedure Laterality Date    ADENOIDECTOMY      ADRENAL GLAND SURGERY      APPENDECTOMY      BACK SURGERY      fusion l 4-5 s 1,2,3  fusion l 2-3    EYE SURGERY      HEMORRHOID SURGERY      HERNIA REPAIR      HYSTERECTOMY      TONSILLECTOMY       Family History   Problem Relation Age of Onset    Heart disease Mother     Hypertension Mother     Diabetes Mother   "   Hypertension Father     Kidney disease Father      Review of Systems   Constitutional: Positive for appetite change, fatigue and fever.   HENT: Negative for sore throat and trouble swallowing.    Respiratory: Positive for shortness of breath. Negative for cough and chest tightness.    Cardiovascular: Negative for chest pain, palpitations and leg swelling.   Gastrointestinal: Positive for abdominal pain. Negative for blood in stool, constipation, diarrhea, nausea, rectal pain and vomiting.   Genitourinary: Negative for decreased urine volume, difficulty urinating and dysuria.   Musculoskeletal: Negative for arthralgias, back pain and myalgias.   Skin: Negative for color change and rash.   Neurological: Negative for dizziness, weakness and headaches.   Hematological: Negative for adenopathy.   Psychiatric/Behavioral: Positive for sleep disturbance. The patient is not nervous/anxious.        Objective:   /80  Pulse 89  Temp (!) 101.1 °F (38.4 °C) (Temporal)   Resp 14  Ht 5' 1" (1.549 m)  Wt 68.9 kg (152 lb)  SpO2 95%  BMI 28.72 kg/m2  Physical Exam   Constitutional: She is oriented to person, place, and time. She appears well-developed and well-nourished. No distress.   Uncomfortable due to pain, non-toxic   HENT:   Head: Normocephalic and atraumatic.   Right Ear: Tympanic membrane, external ear and ear canal normal.   Left Ear: Tympanic membrane, external ear and ear canal normal.   Nose: Nose normal.   Mouth/Throat: Oropharynx is clear and moist.   Eyes: Conjunctivae and EOM are normal. Pupils are equal, round, and reactive to light.   Neck: Normal range of motion. Neck supple.   Cardiovascular: Normal rate, regular rhythm and normal heart sounds.    Pulmonary/Chest: Effort normal and breath sounds normal. No respiratory distress.   Abdominal: Soft. Bowel sounds are normal. There is tenderness in the left upper quadrant. There is no rigidity and no guarding.   Genitourinary:   Genitourinary " Comments: No suprapubic tenderness   Musculoskeletal: She exhibits no edema.   No calf pain   Neurological: She is alert and oriented to person, place, and time.   Skin: Skin is warm and dry. She is not diaphoretic.   Psychiatric: She has a normal mood and affect. Her behavior is normal.       Assessment:       1. Left sided abdominal pain    2. Shortness of breath    3. Fever, unspecified fever cause        Plan:   Left sided abdominal pain  -     Amylase; Future; Expected date: 5/10/17  -     Lipase; Future; Expected date: 5/10/17  -     CBC auto differential; Future; Expected date: 5/10/17  -     Comprehensive metabolic panel; Future; Expected date: 5/10/17    Shortness of breath  -     X-Ray Chest PA And Lateral; Future; Expected date: 5/10/17  -     D dimer, quantitative; Future; Expected date: 5/10/17    Fever, unspecified fever cause  Discussed fever control measures.    Reviewed notes from ER. Will proceed with labs as above and 2 view CXR. Given constellation of symptoms and unknown etiology of her pain further assessment in the ER is advised overnight should symptoms further evolve. She is agreeable and acknowledges understanding. RTC prn.

## 2017-05-11 ENCOUNTER — HOSPITAL ENCOUNTER (OUTPATIENT)
Facility: HOSPITAL | Age: 77
Discharge: HOME | End: 2017-05-12
Attending: EMERGENCY MEDICINE | Admitting: INTERNAL MEDICINE
Payer: MEDICARE

## 2017-05-11 ENCOUNTER — TELEPHONE (OUTPATIENT)
Dept: FAMILY MEDICINE | Facility: CLINIC | Age: 77
End: 2017-05-11

## 2017-05-11 DIAGNOSIS — R10.9 ABDOMINAL PAIN: Primary | ICD-10-CM

## 2017-05-11 DIAGNOSIS — E11.9 TYPE 2 DIABETES MELLITUS WITHOUT COMPLICATION, WITHOUT LONG-TERM CURRENT USE OF INSULIN: ICD-10-CM

## 2017-05-11 DIAGNOSIS — N39.0 URINARY TRACT INFECTION WITHOUT HEMATURIA, SITE UNSPECIFIED: ICD-10-CM

## 2017-05-11 DIAGNOSIS — I20.89 ANGINAL EQUIVALENT: ICD-10-CM

## 2017-05-11 DIAGNOSIS — R07.9 CHEST PAIN: ICD-10-CM

## 2017-05-11 DIAGNOSIS — I10 BENIGN ESSENTIAL HTN: ICD-10-CM

## 2017-05-11 DIAGNOSIS — R79.89 ELEVATED D-DIMER: ICD-10-CM

## 2017-05-11 LAB
ALBUMIN SERPL BCP-MCNC: 3.6 G/DL
ALP SERPL-CCNC: 81 U/L
ALT SERPL W/O P-5'-P-CCNC: 31 U/L
ANION GAP SERPL CALC-SCNC: 11 MMOL/L
AST SERPL-CCNC: 21 U/L
BACTERIA #/AREA URNS HPF: ABNORMAL /HPF
BASOPHILS # BLD AUTO: 0.04 K/UL
BASOPHILS NFR BLD: 0.3 %
BILIRUB SERPL-MCNC: 0.9 MG/DL
BILIRUB UR QL STRIP: ABNORMAL
BNP SERPL-MCNC: 36 PG/ML
BUN SERPL-MCNC: 22 MG/DL
CALCIUM SERPL-MCNC: 10 MG/DL
CHLORIDE SERPL-SCNC: 104 MMOL/L
CLARITY UR: CLEAR
CO2 SERPL-SCNC: 27 MMOL/L
COLOR UR: YELLOW
CREAT SERPL-MCNC: 1 MG/DL
D DIMER PPP IA.FEU-MCNC: 0.86 MG/L FEU
DIFFERENTIAL METHOD: ABNORMAL
EOSINOPHIL # BLD AUTO: 0.1 K/UL
EOSINOPHIL NFR BLD: 0.4 %
ERYTHROCYTE [DISTWIDTH] IN BLOOD BY AUTOMATED COUNT: 13.6 %
EST. GFR  (AFRICAN AMERICAN): >60 ML/MIN/1.73 M^2
EST. GFR  (NON AFRICAN AMERICAN): 54 ML/MIN/1.73 M^2
GLUCOSE SERPL-MCNC: 108 MG/DL
GLUCOSE UR QL STRIP: NEGATIVE
HCT VFR BLD AUTO: 42.2 %
HGB BLD-MCNC: 14 G/DL
HGB UR QL STRIP: NEGATIVE
HYALINE CASTS #/AREA URNS LPF: 0 /LPF
KETONES UR QL STRIP: NEGATIVE
LEUKOCYTE ESTERASE UR QL STRIP: ABNORMAL
LYMPHOCYTES # BLD AUTO: 2.4 K/UL
LYMPHOCYTES NFR BLD: 17.5 %
MCH RBC QN AUTO: 29.7 PG
MCHC RBC AUTO-ENTMCNC: 33.2 %
MCV RBC AUTO: 89 FL
MICROSCOPIC COMMENT: ABNORMAL
MONOCYTES # BLD AUTO: 2 K/UL
MONOCYTES NFR BLD: 14.3 %
NEUTROPHILS # BLD AUTO: 9.3 K/UL
NEUTROPHILS NFR BLD: 67.5 %
NITRITE UR QL STRIP: NEGATIVE
PH UR STRIP: 6 [PH] (ref 5–8)
PLATELET # BLD AUTO: 237 K/UL
PMV BLD AUTO: 9.9 FL
POTASSIUM SERPL-SCNC: 3.7 MMOL/L
PROT SERPL-MCNC: 7.6 G/DL
PROT UR QL STRIP: ABNORMAL
RBC # BLD AUTO: 4.72 M/UL
RBC #/AREA URNS HPF: 0 /HPF (ref 0–4)
SODIUM SERPL-SCNC: 142 MMOL/L
SP GR UR STRIP: >=1.03 (ref 1–1.03)
SQUAMOUS #/AREA URNS HPF: 5 /HPF
TROPONIN I SERPL DL<=0.01 NG/ML-MCNC: 0.01 NG/ML
TROPONIN I SERPL DL<=0.01 NG/ML-MCNC: 0.01 NG/ML
TROPONIN I SERPL DL<=0.01 NG/ML-MCNC: 0.02 NG/ML
URN SPEC COLLECT METH UR: ABNORMAL
UROBILINOGEN UR STRIP-ACNC: 1 EU/DL
WBC # BLD AUTO: 13.76 K/UL
WBC #/AREA URNS HPF: 10 /HPF (ref 0–5)

## 2017-05-11 PROCEDURE — 25000003 PHARM REV CODE 250: Performed by: INTERNAL MEDICINE

## 2017-05-11 PROCEDURE — 25000003 PHARM REV CODE 250: Performed by: EMERGENCY MEDICINE

## 2017-05-11 PROCEDURE — 63600175 PHARM REV CODE 636 W HCPCS: Performed by: NURSE PRACTITIONER

## 2017-05-11 PROCEDURE — 84484 ASSAY OF TROPONIN QUANT: CPT | Mod: 91

## 2017-05-11 PROCEDURE — 81000 URINALYSIS NONAUTO W/SCOPE: CPT

## 2017-05-11 PROCEDURE — 96365 THER/PROPH/DIAG IV INF INIT: CPT

## 2017-05-11 PROCEDURE — 25500020 PHARM REV CODE 255: Performed by: INTERNAL MEDICINE

## 2017-05-11 PROCEDURE — 25000003 PHARM REV CODE 250: Performed by: NURSE PRACTITIONER

## 2017-05-11 PROCEDURE — 63600175 PHARM REV CODE 636 W HCPCS: Performed by: EMERGENCY MEDICINE

## 2017-05-11 PROCEDURE — 96374 THER/PROPH/DIAG INJ IV PUSH: CPT

## 2017-05-11 PROCEDURE — G0378 HOSPITAL OBSERVATION PER HR: HCPCS

## 2017-05-11 PROCEDURE — 99285 EMERGENCY DEPT VISIT HI MDM: CPT | Mod: 25

## 2017-05-11 PROCEDURE — 85379 FIBRIN DEGRADATION QUANT: CPT

## 2017-05-11 PROCEDURE — 83036 HEMOGLOBIN GLYCOSYLATED A1C: CPT

## 2017-05-11 PROCEDURE — 84484 ASSAY OF TROPONIN QUANT: CPT

## 2017-05-11 PROCEDURE — 36415 COLL VENOUS BLD VENIPUNCTURE: CPT

## 2017-05-11 PROCEDURE — 96375 TX/PRO/DX INJ NEW DRUG ADDON: CPT

## 2017-05-11 PROCEDURE — 96372 THER/PROPH/DIAG INJ SC/IM: CPT

## 2017-05-11 PROCEDURE — 83880 ASSAY OF NATRIURETIC PEPTIDE: CPT

## 2017-05-11 PROCEDURE — 85025 COMPLETE CBC W/AUTO DIFF WBC: CPT

## 2017-05-11 PROCEDURE — 80053 COMPREHEN METABOLIC PANEL: CPT

## 2017-05-11 PROCEDURE — 93005 ELECTROCARDIOGRAM TRACING: CPT

## 2017-05-11 PROCEDURE — 93010 ELECTROCARDIOGRAM REPORT: CPT | Mod: ,,, | Performed by: INTERNAL MEDICINE

## 2017-05-11 PROCEDURE — 87040 BLOOD CULTURE FOR BACTERIA: CPT

## 2017-05-11 PROCEDURE — 87086 URINE CULTURE/COLONY COUNT: CPT

## 2017-05-11 RX ORDER — CIPROFLOXACIN 2 MG/ML
400 INJECTION, SOLUTION INTRAVENOUS
Status: DISCONTINUED | OUTPATIENT
Start: 2017-05-11 | End: 2017-05-12 | Stop reason: HOSPADM

## 2017-05-11 RX ORDER — IBUPROFEN 200 MG
16 TABLET ORAL
Status: DISCONTINUED | OUTPATIENT
Start: 2017-05-11 | End: 2017-05-12 | Stop reason: HOSPADM

## 2017-05-11 RX ORDER — AMLODIPINE BESYLATE 10 MG/1
10 TABLET ORAL DAILY
Status: DISCONTINUED | OUTPATIENT
Start: 2017-05-11 | End: 2017-05-12 | Stop reason: HOSPADM

## 2017-05-11 RX ORDER — ASPIRIN 325 MG
325 TABLET ORAL
Status: COMPLETED | OUTPATIENT
Start: 2017-05-11 | End: 2017-05-11

## 2017-05-11 RX ORDER — MORPHINE SULFATE 2 MG/ML
2 INJECTION, SOLUTION INTRAMUSCULAR; INTRAVENOUS EVERY 4 HOURS PRN
Status: DISCONTINUED | OUTPATIENT
Start: 2017-05-11 | End: 2017-05-12 | Stop reason: HOSPADM

## 2017-05-11 RX ORDER — IBUPROFEN 200 MG
24 TABLET ORAL
Status: DISCONTINUED | OUTPATIENT
Start: 2017-05-11 | End: 2017-05-12 | Stop reason: HOSPADM

## 2017-05-11 RX ORDER — METRONIDAZOLE 500 MG/100ML
500 INJECTION, SOLUTION INTRAVENOUS
Status: DISCONTINUED | OUTPATIENT
Start: 2017-05-11 | End: 2017-05-12 | Stop reason: HOSPADM

## 2017-05-11 RX ORDER — PANTOPRAZOLE SODIUM 40 MG/1
40 TABLET, DELAYED RELEASE ORAL DAILY
Status: DISCONTINUED | OUTPATIENT
Start: 2017-05-11 | End: 2017-05-12 | Stop reason: HOSPADM

## 2017-05-11 RX ORDER — INSULIN ASPART 100 [IU]/ML
0-5 INJECTION, SOLUTION INTRAVENOUS; SUBCUTANEOUS
Status: DISCONTINUED | OUTPATIENT
Start: 2017-05-11 | End: 2017-05-12 | Stop reason: HOSPADM

## 2017-05-11 RX ORDER — GLUCAGON 1 MG
1 KIT INJECTION
Status: DISCONTINUED | OUTPATIENT
Start: 2017-05-11 | End: 2017-05-12 | Stop reason: HOSPADM

## 2017-05-11 RX ORDER — HYDROCODONE BITARTRATE AND ACETAMINOPHEN 10; 325 MG/1; MG/1
1 TABLET ORAL EVERY 6 HOURS PRN
Status: DISCONTINUED | OUTPATIENT
Start: 2017-05-11 | End: 2017-05-12 | Stop reason: HOSPADM

## 2017-05-11 RX ORDER — LISINOPRIL 20 MG/1
20 TABLET ORAL DAILY
Status: DISCONTINUED | OUTPATIENT
Start: 2017-05-11 | End: 2017-05-12 | Stop reason: HOSPADM

## 2017-05-11 RX ORDER — SODIUM CHLORIDE 9 MG/ML
INJECTION, SOLUTION INTRAVENOUS CONTINUOUS
Status: DISCONTINUED | OUTPATIENT
Start: 2017-05-11 | End: 2017-05-12 | Stop reason: HOSPADM

## 2017-05-11 RX ORDER — ENOXAPARIN SODIUM 100 MG/ML
40 INJECTION SUBCUTANEOUS EVERY 24 HOURS
Status: DISCONTINUED | OUTPATIENT
Start: 2017-05-11 | End: 2017-05-12 | Stop reason: HOSPADM

## 2017-05-11 RX ORDER — ONDANSETRON 2 MG/ML
4 INJECTION INTRAMUSCULAR; INTRAVENOUS EVERY 12 HOURS PRN
Status: DISCONTINUED | OUTPATIENT
Start: 2017-05-11 | End: 2017-05-12 | Stop reason: HOSPADM

## 2017-05-11 RX ORDER — ACETAMINOPHEN 325 MG/1
650 TABLET ORAL EVERY 6 HOURS PRN
Status: DISCONTINUED | OUTPATIENT
Start: 2017-05-11 | End: 2017-05-12 | Stop reason: HOSPADM

## 2017-05-11 RX ADMIN — ASPIRIN 325 MG ORAL TABLET 325 MG: 325 PILL ORAL at 11:05

## 2017-05-11 RX ADMIN — METRONIDAZOLE 500 MG: 500 INJECTION, SOLUTION INTRAVENOUS at 07:05

## 2017-05-11 RX ADMIN — MORPHINE SULFATE 2 MG: 2 INJECTION, SOLUTION INTRAMUSCULAR; INTRAVENOUS at 05:05

## 2017-05-11 RX ADMIN — SODIUM CHLORIDE 1000 ML: 0.9 INJECTION, SOLUTION INTRAVENOUS at 03:05

## 2017-05-11 RX ADMIN — CIPROFLOXACIN 400 MG: 2 INJECTION INTRAVENOUS at 03:05

## 2017-05-11 RX ADMIN — HYDROCODONE BITARTRATE AND ACETAMINOPHEN 1 TABLET: 10; 325 TABLET ORAL at 11:05

## 2017-05-11 RX ADMIN — AMLODIPINE BESYLATE 10 MG: 10 TABLET ORAL at 03:05

## 2017-05-11 RX ADMIN — ENOXAPARIN SODIUM 40 MG: 100 INJECTION SUBCUTANEOUS at 07:05

## 2017-05-11 RX ADMIN — LIDOCAINE HYDROCHLORIDE 50 ML: 20 SOLUTION ORAL; TOPICAL at 07:05

## 2017-05-11 RX ADMIN — IOHEXOL 100 ML: 350 INJECTION, SOLUTION INTRAVENOUS at 10:05

## 2017-05-11 NOTE — ED NOTES
0.86 [facility]  Lab called to report results, they are having issues transmitting the results to Epic.

## 2017-05-11 NOTE — SUBJECTIVE & OBJECTIVE
Past Medical History:   Diagnosis Date    Arthritis     Cataract     Diabetes mellitus     controled with diet    GERD (gastroesophageal reflux disease)     Hypertension        Past Surgical History:   Procedure Laterality Date    ADENOIDECTOMY      ADRENAL GLAND SURGERY      APPENDECTOMY      BACK SURGERY      fusion l 4-5 s 1,2,3  fusion l 2-3    EYE SURGERY      HEMORRHOID SURGERY      HERNIA REPAIR      HYSTERECTOMY      TONSILLECTOMY         Review of patient's allergies indicates:   Allergen Reactions    Percocet [oxycodone-acetaminophen] Other (See Comments)     Seizures    Codeine Nausea Only and Rash       No current facility-administered medications on file prior to encounter.      Current Outpatient Prescriptions on File Prior to Encounter   Medication Sig    amlodipine (NORVASC) 10 MG tablet     hydrocodone-acetaminophen 7.5-325mg (NORCO) 7.5-325 mg per tablet Take 1 tablet by mouth every 4 (four) hours as needed for Pain.    lisinopril (PRINIVIL,ZESTRIL) 20 MG tablet Take 20 mg by mouth once daily.    ondansetron (ZOFRAN) 4 MG tablet Take 1 tablet (4 mg total) by mouth every 8 (eight) hours as needed for Nausea.    pantoprazole (PROTONIX) 40 MG tablet     tizanidine (ZANAFLEX) 4 MG tablet Take 4 mg by mouth every 6 (six) hours as needed.     Family History     Problem Relation (Age of Onset)    Diabetes Mother    Heart disease Mother    Hypertension Mother, Father    Kidney disease Father        Social History Main Topics    Smoking status: Never Smoker    Smokeless tobacco: Never Used    Alcohol use No    Drug use: No    Sexual activity: Not Currently     Review of Systems   Constitutional: Positive for fever. Negative for activity change, appetite change, chills, diaphoresis, fatigue and unexpected weight change.   HENT: Negative for congestion, drooling, facial swelling, rhinorrhea, sinus pressure, sneezing and sore throat.    Eyes: Negative for discharge, redness, itching  and visual disturbance.   Respiratory: Negative for apnea, cough, choking, chest tightness, shortness of breath, wheezing and stridor.    Cardiovascular: Negative for chest pain, palpitations and leg swelling.   Gastrointestinal: Positive for abdominal pain. Negative for abdominal distention, anal bleeding, blood in stool, constipation, diarrhea, nausea and vomiting.   Genitourinary: Positive for flank pain. Negative for decreased urine volume, difficulty urinating, dysuria, frequency, hematuria, pelvic pain, urgency, vaginal bleeding and vaginal discharge.   Musculoskeletal: Negative for arthralgias, back pain, gait problem, joint swelling, myalgias, neck pain and neck stiffness.   Skin: Negative for color change, pallor, rash and wound.   Neurological: Negative for dizziness, seizures, facial asymmetry, speech difficulty, weakness, light-headedness, numbness and headaches.   Psychiatric/Behavioral: Negative for agitation, confusion, hallucinations and suicidal ideas.   All other systems reviewed and are negative.    Objective:     Vital Signs (Most Recent):  Temp: 97.6 °F (36.4 °C) (05/11/17 1500)  Pulse: 80 (05/11/17 1500)  Resp: 19 (05/11/17 1500)  BP: (!) 140/68 (05/11/17 1500)  SpO2: 96 % (05/11/17 1500) Vital Signs (24h Range):  Temp:  [97.6 °F (36.4 °C)-98.6 °F (37 °C)] 97.6 °F (36.4 °C)  Pulse:  [80-83] 80  Resp:  [19-20] 19  SpO2:  [96 %] 96 %  BP: (140-143)/(68-69) 140/68     Weight: 68.9 kg (152 lb)  Body mass index is 28.72 kg/(m^2).    Physical Exam   Constitutional: She is oriented to person, place, and time. She appears well-developed and well-nourished.   HENT:   Head: Normocephalic and atraumatic.   Eyes: Conjunctivae and EOM are normal. Pupils are equal, round, and reactive to light.   Neck: Normal range of motion. Neck supple.   Cardiovascular: Normal rate, regular rhythm, normal heart sounds and intact distal pulses.    No murmur heard.  Pulmonary/Chest: Effort normal and breath sounds normal.  No respiratory distress.   Abdominal: Soft. Bowel sounds are normal. She exhibits no distension. There is tenderness.   LUQ TTP   Musculoskeletal: Normal range of motion. She exhibits no edema, tenderness or deformity.   Neurological: She is alert and oriented to person, place, and time. She has normal reflexes.   Skin: Skin is warm and dry. No erythema.   Psychiatric: She has a normal mood and affect. Her behavior is normal.   Nursing note and vitals reviewed.       Significant Labs: All pertinent labs within the past 24 hours have been reviewed.    Significant Imaging:   Imaging Results         X-Ray Abdomen Flat And Erect (Final result) Result time:  05/11/17 12:08:28    Final result by Emery Samayoa MD (05/11/17 12:08:28)    Impression:     Multiple postoperative changes.  Nonspecific bowel gas pattern.      Electronically signed by: EMERY SAMAYOA MD  Date:     05/11/17  Time:    12:08     Narrative:    Procedure: XR ABDOMEN FLAT AND ERECT, 05/11/17 11:38:50    History: Abdominal pain.    Two views of the abdomen. Comparison with 05/09/2017.  No change.  Postoperative clips left upper quadrant.  Postoperative changes lumbar spine.  Hilar quadrant changes consistent with previous hernia repair.  Epidural neurostimulator implant right flank region.    T10 kyphoplasty.    The bowel gas pattern is non-specific with a few minimally prominent loops of small bowel in the central abdomen.  The large bowel is unremarkable.    No free air.            X-Ray Chest PA And Lateral (Final result) Result time:  05/11/17 12:07:22    Final result by Emery Samayoa MD (05/11/17 12:07:22)    Impression:         Stable chest x-ray.  No acute findings.      Electronically signed by: EMERY SAMAYOA MD  Date:     05/11/17  Time:    12:07     Narrative:    XR CHEST PA AND LATERAL, 05/11/17 11:38:46    Clinical indication: Chest pain.    Findings:  Comparison with 05/10/2017.    Epidural leads within the dorsal thoracic  spine.  Lower thoracic wedge compression fracture status post kyphoplasty.  Left upper quadrant surgical clips.    Heart size is normal.  Prominent left pericardial fat pad.    Aortic arch calcification.    Lung fields remain clear.

## 2017-05-11 NOTE — NURSING
PT ARRIVED TO FLOOR VIA STRETCHER.  PT ABLE TO TRANSFER TO BED WITH ASSISTANCE  VSS , TELE MONITOR APPLIED  NO COMPLAINTS OF PAIN  PT ORIENTED TO ROOM SERVICE, CALL [facility] AND FALL PREVENTION MEASURES.

## 2017-05-11 NOTE — IP AVS SNAPSHOT
[address] [place] [address]           Patient Discharge Instructions   Our goal is to set you up for success. This packet includes information on your condition, medications, and your home care.  It will help you care for yourself to prevent having to return to the hospital.     Please ask your nurse if you have any questions.      There are many details to remember when preparing to leave the hospital. Here is what you will need to do:    1. Take your medicine. If you are prescribed medications, review your Medication List on the following pages. You may have new medications to  at the pharmacy and others that you'll need to stop taking. Review the instructions for how and when to take your medications. Talk with your doctor or nurses if you are unsure of what to do.     2. Go to your follow-up appointments. Specific follow-up information is listed in the following pages. Your may be contacted by a nurse or clinical provider about future appointments. Be sure we have all of the phone numbers to reach you. Please contact your provider's office if you are unable to make an appointment.     3. Watch for warning signs. Your doctor or nurse will give you detailed warning signs to watch for and when to call for assistance. These instructions may also include educational information about your condition. If you experience any of warning signs to your health, call your doctor.               ** Verify the list of medication(s) below is accurate and up to date. Carry this with you in case of emergency. If your medications have changed, please notify your healthcare provider.             Medication List      START taking these medications        Additional Info                      ciprofloxacin HCl 500 MG tablet   Commonly known as:  CIPRO   Quantity:  18 tablet   Refills:  0   Dose:  500 mg    Instructions:  Take 1 tablet (500 mg total) by mouth every 12 (twelve)  hours.     Begin Date    AM    Noon    PM    Bedtime       metronidazole 500 MG tablet   Commonly known as:  FLAGYL   Quantity:  28 tablet   Refills:  0   Dose:  500 mg    Instructions:  Take 1 tablet (500 mg total) by mouth every 8 (eight) hours.     Begin Date    AM    Noon    PM    Bedtime         CHANGE how you take these medications        Additional Info                      tizanidine 4 MG tablet   Commonly known as:  ZANAFLEX   Refills:  0   Dose:  4 mg   What changed:  reasons to take this    Instructions:  Take 1 tablet (4 mg total) by mouth every 6 (six) hours as needed (HOLD while on CIPRO).     Begin Date    AM    Noon    PM    Bedtime         CONTINUE taking these medications        Additional Info                      amlodipine 10 MG tablet   Commonly known as:  NORVASC   Refills:  0    Last time this was given:  10 mg on 5/12/2017  9:39 AM     Begin Date    AM    Noon    PM    Bedtime       hydrocodone-acetaminophen 7.5-325mg 7.5-325 mg per tablet   Commonly known as:  NORCO   Quantity:  12 tablet   Refills:  0   Dose:  1 tablet    Instructions:  Take 1 tablet by mouth every 4 (four) hours as needed for Pain.     Begin Date    AM    Noon    PM    Bedtime       lisinopril 20 MG tablet   Commonly known as:  PRINIVIL,ZESTRIL   Refills:  0   Dose:  20 mg    Last time this was given:  20 mg on 5/12/2017  9:42 AM   Instructions:  Take 20 mg by mouth once daily.     Begin Date    AM    Noon    PM    Bedtime       ondansetron 4 MG tablet   Commonly known as:  ZOFRAN   Quantity:  12 tablet   Refills:  0   Dose:  4 mg    Instructions:  Take 1 tablet (4 mg total) by mouth every 8 (eight) hours as needed for Nausea.     Begin Date    AM    Noon    PM    Bedtime       pantoprazole 40 MG tablet   Commonly known as:  PROTONIX   Refills:  0    Last time this was given:  40 mg on 5/12/2017  9:38 AM     Begin Date    AM    Noon    PM    Bedtime            Where to Get Your Medications      These medications were sent  to [facility] - [place], [place] - [address] [facility] [address]  [address] [address] [facility] [address], [facility] [address]     Phone:  [phone]     ciprofloxacin HCl 500 MG tablet    metronidazole 500 MG tablet         Information about where to get these medications is not yet available     ! Ask your nurse or doctor about these medications     tizanidine 4 MG tablet                  Please bring to all follow up appointments:    1. A copy of your discharge instructions.  2. All medicines you are currently taking in their original bottles.  3. Identification and insurance card.    Please arrive 15 minutes ahead of scheduled appointment time.    Please call 24 hours in advance if you must reschedule your appointment and/or time.        Your Scheduled Appointments     May 15, 2017  8:20 AM CDT   New Patient with [facility] - Gastroenterology ([facility])    [address] [place] [id number] [phone]              Follow-up Information     [facility] with [facility]. Schedule an appointment as soon as possible for a visit in 1 week.        Discharge Instructions     Future Orders    Activity as tolerated     Call MD for:  difficulty breathing or increased cough     Call MD for:  increased confusion or weakness     Call MD for:  persistent dizziness, light-headedness, or visual disturbances     Call MD for:  persistent nausea and vomiting or diarrhea     Call MD for:  redness, tenderness, or signs of infection (pain, swelling, redness, odor or green/yellow discharge around incision site)     Call MD for:  severe persistent headache     Call MD for:  severe uncontrolled pain     Call MD for:  temperature >100.4     Call MD for:  worsening rash     Diet general     Questions:    Total calories:      Fat restriction, if any:      Protein restriction, if any:      Na restriction, if any:      Fluid restriction:      Additional restrictions:          Primary Diagnosis     Your primary diagnosis  "was:  Abdominal Pain      Admission Information     Date & Time Provider Department CSN    5/11/2017 11:12 AM Radha Lam MD [facility] [id number]      Care Providers     Provider Role Specialty Primary office phone    Radha Lam MD Attending Provider Internal Medicine [phone]    Nilsa Staton NP Consulting Physician  Family Medicine [phone]    Drew Jewell MD Team Attending  Internal Medicine [phone]    Drew Jewell MD Consulting Physician  Internal Medicine [phone]      Your Vitals Were     BP Pulse Temp Resp Height Weight    139/63 (BP Location: Right arm, Patient Position: Lying, BP Method: Automatic) 76 98.2 °F (36.8 °C) (Oral) 18 5' 1" (1.549 m) 68.9 kg (152 lb)    SpO2 BMI             97% 28.72 kg/m2         Recent Lab Values     No lab values to display.      Pending Labs     Order Current Status    Blood Culture #2 **CANNOT BE ORDERED STAT** In process    Blood culture In process    Hemoglobin A1c if not done in past 6 weeks In process    Urine culture In process      Allergies as of 5/12/2017        Reactions    Percocet [Oxycodone-acetaminophen] Other (See Comments)    Seizures    Codeine Nausea Only, Rash      [facility]     [facility] Nurse Care Line - 24/7 Assistance  Unless otherwise directed by your provider, please contact [facility], our nurse care line that is available for 24/7 assistance.     Registered nurses in the [facility] provide clinical advisement, health education, appointment booking, and other advisory services.  Call for this free service at [phone].        Advance Directives     An advance directive is a document which, in the event you are no longer able to make decisions for yourself, tells your healthcare team what kind of treatment you do or do not want to receive, or who you would like to make those decisions for you.  If you do not currently have an advance directive, [facility] encourages " you to create one.  For more information call:  [phone]), [phone] ([phone]),  or log on to [email or web address].        Language Assistance Services     ATTENTION: Language assistance services are available, free of charge. Please call [phone].      ATENCIÓN: Si habla español, tiene a montague disposición servicios gratuitos de asistencia lingüística. Llame al [phone].     CHÚ Ý: N?u b?n nói Ti?ng Vi?t, có các d?ch v? h? tr? ngôn ng? mi?n phí dành cho b?n. G?i s? [phone].        Diabetes Discharge Instructions                                   [facility] Sign-Up     Activating your [facility] account is as easy as 1-2-3!     1) Visit [email or web address], select Sign Up Now, enter this activation code and your date of birth, then select Next.  [email or web address]  Expires: 6/23/2017 10:28 PM      2) Create a username and password to use when you visit [facility] in the future and select a security question in case you lose your password and select Next.    3) Enter your e-mail address and click Sign Up!    Additional Information  If you have questions, please e-mail [email or web address] or call [phone] to talk to our [facility] staff. Remember, [facility] is NOT to be used for urgent needs. For medical emergencies, dial 911.          [facility] complies with applicable Federal civil rights laws and does not discriminate on the basis of race, color, national origin, age, disability, or sex.

## 2017-05-11 NOTE — H&P
"[facility] Hospital Medicine  History & Physical    Patient Name: Leslie Wood  MRN: [id number]  Admission Date: 5/11/2017  Attending Physician: Radha Lam MD   Primary Care Provider: Adi Almeida MD         Patient information was obtained from patient and ER records.     Subjective:     Principal Problem:Abdominal pain    Chief Complaint:   Chief Complaint   Patient presents with    Chest Pain     pt sent from Dr Desai's office for chest pain, under left breast, since Sunday. pt has seen three different dr's this week for same; she was here on Monday and blood work and ct scan's were done        HPI: Leslie Wood is a 75 yo female with a PMH of HTN, DM who presents to the ER with c/o left flank pain. The patient reports that she has been to 3 different doctors " and nobody can tell me what's wrong". Associated symptoms include fever and abdominal pain. The patient denies any modifying factors. Patient denies chills, CP, cough, brooks, pnd, orthopnea, palpitations, diaphoresis, headache, blurred vision, numbness, tingling, dizziness, localized weakness, n/v/d, blood in stools, melena, hematemesis, urinary frequency, urgency, dysuria or hematuria. Initial troponin were negative. The patient was noted to have a UTI on UA. Will admit to observation, rule out ACS and treat UTI.         Past Medical History:   Diagnosis Date    Arthritis     Cataract     Diabetes mellitus     controled with diet    GERD (gastroesophageal reflux disease)     Hypertension        Past Surgical History:   Procedure Laterality Date    ADENOIDECTOMY      ADRENAL GLAND SURGERY      APPENDECTOMY      BACK SURGERY      fusion l 4-5 s 1,2,3  fusion l 2-3    EYE SURGERY      HEMORRHOID SURGERY      HERNIA REPAIR      HYSTERECTOMY      TONSILLECTOMY         Review of patient's allergies indicates:   Allergen Reactions    Percocet [oxycodone-acetaminophen] Other (See Comments)     Seizures    Codeine " Nausea Only and Rash       No current facility-administered medications on file prior to encounter.      Current Outpatient Prescriptions on File Prior to Encounter   Medication Sig    amlodipine (NORVASC) 10 MG tablet     hydrocodone-acetaminophen 7.5-325mg (NORCO) 7.5-325 mg per tablet Take 1 tablet by mouth every 4 (four) hours as needed for Pain.    lisinopril (PRINIVIL,ZESTRIL) 20 MG tablet Take 20 mg by mouth once daily.    ondansetron (ZOFRAN) 4 MG tablet Take 1 tablet (4 mg total) by mouth every 8 (eight) hours as needed for Nausea.    pantoprazole (PROTONIX) 40 MG tablet     tizanidine (ZANAFLEX) 4 MG tablet Take 4 mg by mouth every 6 (six) hours as needed.     Family History     Problem Relation (Age of Onset)    Diabetes Mother    Heart disease Mother    Hypertension Mother, Father    Kidney disease Father        Social History Main Topics    Smoking status: Never Smoker    Smokeless tobacco: Never Used    Alcohol use No    Drug use: No    Sexual activity: Not Currently     Review of Systems   Constitutional: Positive for fever. Negative for activity change, appetite change, chills, diaphoresis, fatigue and unexpected weight change.   HENT: Negative for congestion, drooling, facial swelling, rhinorrhea, sinus pressure, sneezing and sore throat.    Eyes: Negative for discharge, redness, itching and visual disturbance.   Respiratory: Negative for apnea, cough, choking, chest tightness, shortness of breath, wheezing and stridor.    Cardiovascular: Negative for chest pain, palpitations and leg swelling.   Gastrointestinal: Positive for abdominal pain. Negative for abdominal distention, anal bleeding, blood in stool, constipation, diarrhea, nausea and vomiting.   Genitourinary: Positive for flank pain. Negative for decreased urine volume, difficulty urinating, dysuria, frequency, hematuria, pelvic pain, urgency, vaginal bleeding and vaginal discharge.   Musculoskeletal: Negative for arthralgias,  back pain, gait problem, joint swelling, myalgias, neck pain and neck stiffness.   Skin: Negative for color change, pallor, rash and wound.   Neurological: Negative for dizziness, seizures, facial asymmetry, speech difficulty, weakness, light-headedness, numbness and headaches.   Psychiatric/Behavioral: Negative for agitation, confusion, hallucinations and suicidal ideas.   All other systems reviewed and are negative.    Objective:     Vital Signs (Most Recent):  Temp: 97.6 °F (36.4 °C) (05/11/17 1500)  Pulse: 80 (05/11/17 1500)  Resp: 19 (05/11/17 1500)  BP: (!) 140/68 (05/11/17 1500)  SpO2: 96 % (05/11/17 1500) Vital Signs (24h Range):  Temp:  [97.6 °F (36.4 °C)-98.6 °F (37 °C)] 97.6 °F (36.4 °C)  Pulse:  [80-83] 80  Resp:  [19-20] 19  SpO2:  [96 %] 96 %  BP: (140-143)/(68-69) 140/68     Weight: 68.9 kg (152 lb)  Body mass index is 28.72 kg/(m^2).    Physical Exam   Constitutional: She is oriented to person, place, and time. She appears well-developed and well-nourished.   HENT:   Head: Normocephalic and atraumatic.   Eyes: Conjunctivae and EOM are normal. Pupils are equal, round, and reactive to light.   Neck: Normal range of motion. Neck supple.   Cardiovascular: Normal rate, regular rhythm, normal heart sounds and intact distal pulses.    No murmur heard.  Pulmonary/Chest: Effort normal and breath sounds normal. No respiratory distress.   Abdominal: Soft. Bowel sounds are normal. She exhibits no distension. There is tenderness.   LUQ TTP   Musculoskeletal: Normal range of motion. She exhibits no edema, tenderness or deformity.   Neurological: She is alert and oriented to person, place, and time. She has normal reflexes.   Skin: Skin is warm and dry. No erythema.   Psychiatric: She has a normal mood and affect. Her behavior is normal.   Nursing note and vitals reviewed.       Significant Labs: All pertinent labs within the past 24 hours have been reviewed.    Significant Imaging:   Imaging Results         X-Ray  Abdomen Flat And Erect (Final result) Result time:  05/11/17 12:08:28    Final result by Emery Samayoa MD (05/11/17 12:08:28)    Impression:     Multiple postoperative changes.  Nonspecific bowel gas pattern.      Electronically signed by: EMERY SAMAYOA MD  Date:     05/11/17  Time:    12:08     Narrative:    Procedure: XR ABDOMEN FLAT AND ERECT, 05/11/17 11:38:50    History: Abdominal pain.    Two views of the abdomen. Comparison with 05/09/2017.  No change.  Postoperative clips left upper quadrant.  Postoperative changes lumbar spine.  Hilar quadrant changes consistent with previous hernia repair.  Epidural neurostimulator implant right flank region.    T10 kyphoplasty.    The bowel gas pattern is non-specific with a few minimally prominent loops of small bowel in the central abdomen.  The large bowel is unremarkable.    No free air.            X-Ray Chest PA And Lateral (Final result) Result time:  05/11/17 12:07:22    Final result by Emery Samayoa MD (05/11/17 12:07:22)    Impression:         Stable chest x-ray.  No acute findings.      Electronically signed by: EMERY SAMAYOA MD  Date:     05/11/17  Time:    12:07     Narrative:    XR CHEST PA AND LATERAL, 05/11/17 11:38:46    Clinical indication: Chest pain.    Findings:  Comparison with 05/10/2017.    Epidural leads within the dorsal thoracic spine.  Lower thoracic wedge compression fracture status post kyphoplasty.  Left upper quadrant surgical clips.    Heart size is normal.  Prominent left pericardial fat pad.    Aortic arch calcification.    Lung fields remain clear.                 Assessment/Plan:     * Abdominal pain  - Anginal equivalent?  - trend troponin  - ECHO      Benign essential HTN  - monitor VS   - resume home meds      Type 2 diabetes mellitus without complication  - SSI   - diabetic diet      Urinary tract infection without hematuria  - Urine cx pending   - IV cipro      Elevated d-dimer  - CTA chest      VTE Risk  Mitigation         Ordered     enoxaparin injection 40 mg  Daily     Route:  Subcutaneous        05/11/17 1751     Medium Risk of VTE  Once      05/11/17 1641     Place sequential compression device  Until discontinued      05/11/17 1641        Jayden Pan NP  Department of Hospital Medicine   [facility]

## 2017-05-11 NOTE — ED PROVIDER NOTES
SCRIBE #1 NOTE: I, Tai Desai, am scribing for, and in the presence of, Rob Betancur MD. I have scribed the entire note.      History      Chief Complaint   Patient presents with    Chest Pain     pt sent from Dr Desai's office for chest pain, under left breast, since Sunday. pt has seen three different dr's this week for same; she was here on Monday and blood work and ct scan's were done       Review of patient's allergies indicates:   Allergen Reactions    Percocet [oxycodone-acetaminophen] Other (See Comments)     Seizures    Codeine Nausea Only and Rash        HPI   HPI    5/11/2017, 11:34 AM   History obtained from the patient      History of Present Illness: Leslie Wood is a 76 y.o. female patient who presents to the Emergency Department for chest pain which onset gradually 4 days ago. Pt states she has seen 3 different doctors for her CP and presented to the ED 3 days ago. Symptoms are constant and moderate in severity. Pain is located under left breast. Pain is exacerbated with deep inhalation. No other mitigating or exacerbating factors reported. Associated sxs include subjective fever, diffuse abd pain. Patient denies any SOB, diaphoresis, palpitations, extremity weakness/numbness, leg pain/swelling, dizziness, cough, n/v, and all other sxs at this time. No prior tx reported. No further complaints or concerns at this time.         Arrival mode: Personal vehicle      PCP: dAi Almeida MD       Past Medical History:  Past Medical History:   Diagnosis Date    Arthritis     Cataract     Diabetes mellitus     controled with diet    GERD (gastroesophageal reflux disease)     Hypertension        Past Surgical History:  Past Surgical History:   Procedure Laterality Date    ADENOIDECTOMY      ADRENAL GLAND SURGERY      APPENDECTOMY      BACK SURGERY      fusion l 4-5 s 1,2,3  fusion l 2-3    EYE SURGERY      HEMORRHOID SURGERY      HERNIA REPAIR      HYSTERECTOMY       TONSILLECTOMY           Family History:  Family History   Problem Relation Age of Onset    Heart disease Mother     Hypertension Mother     Diabetes Mother     Hypertension Father     Kidney disease Father        Social History:  Social History     Social History Main Topics    Smoking status: Never Smoker    Smokeless tobacco: Never Used    Alcohol use No    Drug use: No    Sexual activity: Not Currently       ROS   Review of Systems   Constitutional: Positive for fever (subjective). Negative for chills and diaphoresis.   HENT: Negative for sore throat.    Respiratory: Negative for cough and shortness of breath.    Cardiovascular: Positive for chest pain (L-sided). Negative for palpitations and leg swelling (-) leg pain.   Gastrointestinal: Positive for abdominal pain (diffuse). Negative for diarrhea, nausea and vomiting.   Genitourinary: Negative for dysuria.   Musculoskeletal: Negative for back pain.        (-) extremity weakness/numbness     Skin: Negative for rash.   Neurological: Negative for dizziness and weakness.   Hematological: Does not bruise/bleed easily.       Physical Exam    Initial Vitals   BP Pulse Resp Temp SpO2   05/11/17 1057 05/11/17 1057 05/11/17 1057 05/11/17 1057 05/11/17 1057   143/69 83 20 98.6 °F (37 °C) 96 %      Physical Exam  Nursing Notes and Vital Signs Reviewed.  Constitutional: Patient is in no apparent distress. Awake and alert. Well-developed and well-nourished.  Head: Atraumatic. Normocephalic.  Eyes: PERRL. EOM intact. Conjunctivae are not pale. No scleral icterus.  ENT: Mucous membranes are moist. Oropharynx is clear and symmetric.    Neck: Supple. Full ROM. No lymphadenopathy.  Cardiovascular: Regular rate. Regular rhythm. No murmurs, rubs, or gallops. Distal pulses are 2+ and symmetric.  Pulmonary/Chest: No respiratory distress. Clear to auscultation bilaterally. No wheezing, rales, or rhonchi.  Abdominal: Soft and non-distended.  LUQ tenderness.  No rebound,  "guarding, or rigidity. Good bowel sounds.  Genitourinary: No CVA tenderness  Musculoskeletal: Moves all extremities. No obvious deformities. No edema. No calf tenderness. Tenderness to L chest wall.  Skin: Warm and dry.  Neurological:  Alert, awake, and appropriate.  Normal speech.  No acute focal neurological deficits are appreciated.  Psychiatric: Normal affect. Good eye contact. Appropriate in content.    ED Course    Procedures  ED Vital Signs:  Vitals:    05/11/17 1057   BP: (!) 143/69   Pulse: 83   Resp: 20   Temp: 98.6 °F (37 °C)   TempSrc: Oral   SpO2: 96%   Weight: 68.9 kg (152 lb)   Height: 5' 1" (1.549 m)       Abnormal Lab Results:  Labs Reviewed   CBC W/ AUTO DIFFERENTIAL - Abnormal; Notable for the following:        Result Value    WBC 13.76 (*)     Gran # 9.3 (*)     Mono # 2.0 (*)     Lymph% 17.5 (*)     All other components within normal limits   COMPREHENSIVE METABOLIC PANEL - Abnormal; Notable for the following:     eGFR if non  54 (*)     All other components within normal limits   URINALYSIS - Abnormal; Notable for the following:     Specific Gravity, UA >=1.030 (*)     Protein, UA 2+ (*)     Bilirubin (UA) 1+ (*)     Leukocytes, UA 1+ (*)     All other components within normal limits   URINALYSIS MICROSCOPIC - Abnormal; Notable for the following:     WBC, UA 10 (*)     Bacteria, UA Many (*)     All other components within normal limits   CULTURE, URINE   CULTURE, BLOOD   CULTURE, BLOOD   TROPONIN I   TROPONIN I   B-TYPE NATRIURETIC PEPTIDE   D DIMER, QUANTITATIVE   D DIMER, QUANTITATIVE        All Lab Results:  Results for orders placed or performed during the hospital encounter of 05/11/17   CBC auto differential   Result Value Ref Range    WBC 13.76 (H) 3.90 - 12.70 K/uL    RBC 4.72 4.00 - 5.40 M/uL    Hemoglobin 14.0 12.0 - 16.0 g/dL    Hematocrit 42.2 37.0 - 48.5 %    MCV 89 82 - 98 fL    MCH 29.7 27.0 - 31.0 pg    MCHC 33.2 32.0 - 36.0 %    RDW 13.6 11.5 - 14.5 %    " Platelets 237 150 - 350 K/uL    MPV 9.9 9.2 - 12.9 fL    Gran # 9.3 (H) 1.8 - 7.7 K/uL    Lymph # 2.4 1.0 - 4.8 K/uL    Mono # 2.0 (H) 0.3 - 1.0 K/uL    Eos # 0.1 0.0 - 0.5 K/uL    Baso # 0.04 0.00 - 0.20 K/uL    Gran% 67.5 38.0 - 73.0 %    Lymph% 17.5 (L) 18.0 - 48.0 %    Mono% 14.3 4.0 - 15.0 %    Eosinophil% 0.4 0.0 - 8.0 %    Basophil% 0.3 0.0 - 1.9 %    Differential Method Automated    Comprehensive metabolic panel   Result Value Ref Range    Sodium 142 136 - 145 mmol/L    Potassium 3.7 3.5 - 5.1 mmol/L    Chloride 104 95 - 110 mmol/L    CO2 27 23 - 29 mmol/L    Glucose 108 70 - 110 mg/dL    BUN, Bld 22 8 - 23 mg/dL    Creatinine 1.0 0.5 - 1.4 mg/dL    Calcium 10.0 8.7 - 10.5 mg/dL    Total Protein 7.6 6.0 - 8.4 g/dL    Albumin 3.6 3.5 - 5.2 g/dL    Alkaline Phosphatase 81 55 - 135 U/L    AST 21 10 - 40 U/L    ALT 31 10 - 44 U/L    Anion Gap 11 8 - 16 mmol/L    eGFR if African American >60 >60 mL/min/1.73 m^2    eGFR if non African American 54 (A) >60 mL/min/1.73 m^2   Troponin I #1   Result Value Ref Range    Troponin I 0.018 0.000 - 0.026 ng/mL   Troponin I #2   Result Value Ref Range    Troponin I 0.009 0.000 - 0.026 ng/mL   B-Type natriuretic peptide (BNP)   Result Value Ref Range    BNP 36 0 - 99 pg/mL   Urinalysis   Result Value Ref Range    Specimen UA Urine, Clean Catch     Color, UA Yellow Yellow, Straw, Izabela    Appearance, UA Clear Clear    pH, UA 6.0 5.0 - 8.0    Specific Gravity, UA >=1.030 (A) 1.005 - 1.030    Protein, UA 2+ (A) Negative    Glucose, UA Negative Negative    Ketones, UA Negative Negative    Bilirubin (UA) 1+ (A) Negative    Occult Blood UA Negative Negative    Nitrite, UA Negative Negative    Urobilinogen, UA 1.0 <2.0 EU/dL    Leukocytes, UA 1+ (A) Negative   Urinalysis Microscopic   Result Value Ref Range    RBC, UA 0 0 - 4 /hpf    WBC, UA 10 (H) 0 - 5 /hpf    Bacteria, UA Many (A) None-Occ /hpf    Squam Epithel, UA 5 /hpf    Hyaline Casts, UA 0 0-1/lpf /lpf    Microscopic Comment  SEE COMMENT          Imaging Results:  Imaging Results         X-Ray Abdomen Flat And Erect (Final result) Result time:  05/11/17 12:08:28    Final result by Emery Samayoa MD (05/11/17 12:08:28)    Impression:     Multiple postoperative changes.  Nonspecific bowel gas pattern.      Electronically signed by: EMERY SAMAYOA MD  Date:     05/11/17  Time:    12:08     Narrative:    Procedure: XR ABDOMEN FLAT AND ERECT, 05/11/17 11:38:50    History: Abdominal pain.    Two views of the abdomen. Comparison with 05/09/2017.  No change.  Postoperative clips left upper quadrant.  Postoperative changes lumbar spine.  Hilar quadrant changes consistent with previous hernia repair.  Epidural neurostimulator implant right flank region.    T10 kyphoplasty.    The bowel gas pattern is non-specific with a few minimally prominent loops of small bowel in the central abdomen.  The large bowel is unremarkable.    No free air.            X-Ray Chest PA And Lateral (Final result) Result time:  05/11/17 12:07:22    Final result by Emery Samayoa MD (05/11/17 12:07:22)    Impression:         Stable chest x-ray.  No acute findings.      Electronically signed by: EMERY SAMAYOA MD  Date:     05/11/17  Time:    12:07     Narrative:    XR CHEST PA AND LATERAL, 05/11/17 11:38:46    Clinical indication: Chest pain.    Findings:  Comparison with 05/10/2017.    Epidural leads within the dorsal thoracic spine.  Lower thoracic wedge compression fracture status post kyphoplasty.  Left upper quadrant surgical clips.    Heart size is normal.  Prominent left pericardial fat pad.    Aortic arch calcification.    Lung fields remain clear.             The EKG was ordered, reviewed, and independently interpreted by the ED provider.  Interpretation time: 11:46  Rate: 65 BPM  Rhythm: normal sinus rhythm  Interpretation: No acute ST changes. No STEMI.           The Emergency Provider reviewed the vital signs and test results, which are outlined  above.    ED Discussion     1:33 PM: Discussed case with Dr. Lam ([facility]). Dr. Lam agrees with current care and management of pt and accepts admission.   Admitting Service: Hospital medicine   Admitting Physician: Radha Lam MD  Admit to: Obs      1:30 PM: Re-evaluated pt. Pt is in no apparent distress but co abdominal pain. Pt states that there is no change in her chest pain. I have discussed test results, shared treatment plan, and the need for admission with patient  at bedside. Pt expresses understanding at this time and agree with all information. All questions answered. Pt has no further questions or concerns at this time. Pt is ready for admit.      ED Medication(s):  Medications   aspirin tablet 325 mg (325 mg Oral Given 5/11/17 5003)       New Prescriptions    No medications on file             Medical Decision Making    Medical Decision Making:   Clinical Tests:   Lab Tests: Ordered and Reviewed  Radiological Study: Ordered and Reviewed  Medical Tests: Ordered and Reviewed           Scribe Attestation:   Scribe #1: I performed the above scribed service and the documentation accurately describes the services I performed. I attest to the accuracy of the note.    Attending:   Physician Attestation Statement for Scribe #1: I, Rob Betancur MD, personally performed the services described in this documentation, as scribed by Tai Desai, in my presence, and it is both accurate and complete.          Clinical Impression       ICD-10-CM ICD-9-CM   1. Abdominal pain R10.9 789.00   2. Chest pain R07.9 786.50   3. Urinary tract infection without hematuria, site unspecified N39.0 599.0       Disposition:   Disposition: Admitted  Condition: Fair         Rob Betancur MD  05/11/17 1551

## 2017-05-11 NOTE — ED TRIAGE NOTES
"CO LUQ abd pain x 5 days.  Saw PCP 4 days ago.  Rx protonix.  ED later that day with fever and abd pain.  Workup negative and dc.  GI 3 days ago.  Told it was "chest wall pain".  New PCP yesterday.  Febrile at office.  Nurse FU call today instructed to come here.    "

## 2017-05-12 VITALS
HEART RATE: 76 BPM | DIASTOLIC BLOOD PRESSURE: 63 MMHG | HEIGHT: 61 IN | TEMPERATURE: 98 F | BODY MASS INDEX: 28.7 KG/M2 | SYSTOLIC BLOOD PRESSURE: 139 MMHG | OXYGEN SATURATION: 97 % | RESPIRATION RATE: 18 BRPM | WEIGHT: 152 LBS

## 2017-05-12 LAB
ALBUMIN SERPL BCP-MCNC: 2.9 G/DL
ALP SERPL-CCNC: 68 U/L
ALT SERPL W/O P-5'-P-CCNC: 21 U/L
ANION GAP SERPL CALC-SCNC: 7 MMOL/L
AST SERPL-CCNC: 14 U/L
BASOPHILS # BLD AUTO: 0.02 K/UL
BASOPHILS NFR BLD: 0.2 %
BILIRUB SERPL-MCNC: 0.6 MG/DL
BUN SERPL-MCNC: 12 MG/DL
CALCIUM SERPL-MCNC: 9.3 MG/DL
CHLORIDE SERPL-SCNC: 104 MMOL/L
CO2 SERPL-SCNC: 30 MMOL/L
CREAT SERPL-MCNC: 0.7 MG/DL
DIASTOLIC DYSFUNCTION: NO
DIFFERENTIAL METHOD: ABNORMAL
EOSINOPHIL # BLD AUTO: 0.1 K/UL
EOSINOPHIL NFR BLD: 1 %
ERYTHROCYTE [DISTWIDTH] IN BLOOD BY AUTOMATED COUNT: 13.5 %
EST. GFR  (AFRICAN AMERICAN): >60 ML/MIN/1.73 M^2
EST. GFR  (NON AFRICAN AMERICAN): >60 ML/MIN/1.73 M^2
GLUCOSE SERPL-MCNC: 111 MG/DL
HCT VFR BLD AUTO: 38.3 %
HGB BLD-MCNC: 12.4 G/DL
LYMPHOCYTES # BLD AUTO: 1.4 K/UL
LYMPHOCYTES NFR BLD: 13.8 %
MCH RBC QN AUTO: 29 PG
MCHC RBC AUTO-ENTMCNC: 32.4 %
MCV RBC AUTO: 90 FL
MONOCYTES # BLD AUTO: 1.5 K/UL
MONOCYTES NFR BLD: 14.7 %
NEUTROPHILS # BLD AUTO: 7.3 K/UL
NEUTROPHILS NFR BLD: 70.3 %
PLATELET # BLD AUTO: 217 K/UL
PMV BLD AUTO: 9.9 FL
POTASSIUM SERPL-SCNC: 4.6 MMOL/L
PROT SERPL-MCNC: 6.5 G/DL
RBC # BLD AUTO: 4.27 M/UL
RETIRED EF AND QEF - SEE NOTES: 60 (ref 55–65)
SODIUM SERPL-SCNC: 141 MMOL/L
TROPONIN I SERPL DL<=0.01 NG/ML-MCNC: 0.01 NG/ML
WBC # BLD AUTO: 10.39 K/UL

## 2017-05-12 PROCEDURE — G0378 HOSPITAL OBSERVATION PER HR: HCPCS

## 2017-05-12 PROCEDURE — 85025 COMPLETE CBC W/AUTO DIFF WBC: CPT

## 2017-05-12 PROCEDURE — 36415 COLL VENOUS BLD VENIPUNCTURE: CPT

## 2017-05-12 PROCEDURE — 25000003 PHARM REV CODE 250: Performed by: EMERGENCY MEDICINE

## 2017-05-12 PROCEDURE — 84484 ASSAY OF TROPONIN QUANT: CPT

## 2017-05-12 PROCEDURE — 96375 TX/PRO/DX INJ NEW DRUG ADDON: CPT

## 2017-05-12 PROCEDURE — 63600175 PHARM REV CODE 636 W HCPCS: Performed by: EMERGENCY MEDICINE

## 2017-05-12 PROCEDURE — 25000003 PHARM REV CODE 250: Performed by: INTERNAL MEDICINE

## 2017-05-12 PROCEDURE — 96365 THER/PROPH/DIAG IV INF INIT: CPT

## 2017-05-12 PROCEDURE — 96361 HYDRATE IV INFUSION ADD-ON: CPT

## 2017-05-12 PROCEDURE — 80053 COMPREHEN METABOLIC PANEL: CPT

## 2017-05-12 PROCEDURE — 93306 TTE W/DOPPLER COMPLETE: CPT | Mod: 26,,, | Performed by: INTERNAL MEDICINE

## 2017-05-12 PROCEDURE — 93306 TTE W/DOPPLER COMPLETE: CPT

## 2017-05-12 PROCEDURE — 96376 TX/PRO/DX INJ SAME DRUG ADON: CPT

## 2017-05-12 PROCEDURE — 96372 THER/PROPH/DIAG INJ SC/IM: CPT

## 2017-05-12 PROCEDURE — 96367 TX/PROPH/DG ADDL SEQ IV INF: CPT

## 2017-05-12 RX ORDER — TIZANIDINE 4 MG/1
4 TABLET ORAL EVERY 6 HOURS PRN
Start: 2017-05-12 | End: 2021-03-07

## 2017-05-12 RX ORDER — METRONIDAZOLE 500 MG/1
500 TABLET ORAL EVERY 8 HOURS
Qty: 28 TABLET | Refills: 0 | Status: SHIPPED | OUTPATIENT
Start: 2017-05-12 | End: 2017-05-25 | Stop reason: ALTCHOICE

## 2017-05-12 RX ORDER — CIPROFLOXACIN 500 MG/1
500 TABLET ORAL EVERY 12 HOURS
Qty: 18 TABLET | Refills: 0 | Status: SHIPPED | OUTPATIENT
Start: 2017-05-12 | End: 2017-05-21

## 2017-05-12 RX ADMIN — SODIUM CHLORIDE: 0.9 INJECTION, SOLUTION INTRAVENOUS at 03:05

## 2017-05-12 RX ADMIN — METRONIDAZOLE 500 MG: 500 INJECTION, SOLUTION INTRAVENOUS at 04:05

## 2017-05-12 RX ADMIN — AMLODIPINE BESYLATE 10 MG: 10 TABLET ORAL at 09:05

## 2017-05-12 RX ADMIN — LISINOPRIL 20 MG: 20 TABLET ORAL at 09:05

## 2017-05-12 RX ADMIN — CIPROFLOXACIN 400 MG: 2 INJECTION INTRAVENOUS at 03:05

## 2017-05-12 RX ADMIN — LIDOCAINE HYDROCHLORIDE: 20 SOLUTION ORAL; TOPICAL at 11:05

## 2017-05-12 RX ADMIN — PANTOPRAZOLE SODIUM 40 MG: 40 TABLET, DELAYED RELEASE ORAL at 09:05

## 2017-05-12 NOTE — DISCHARGE SUMMARY
"[facility] Hospital Medicine  Discharge Summary      Patient Name: Leslie Wood  MRN: [id number]  Admission Date: 5/11/2017  Hospital Length of Stay: 0 days  Discharge Date and Time:  05/12/2017 1:29 PM  Attending Physician: Dr. Radha Lam   Discharging Provider: Myranda Turner NP  Primary Care Provider: Adi Almeida MD      HPI:   Leslie Wood is a 75 yo female with a PMHx of HTN, DM, who presentsed to the ER with c/o left flank pain. The patient reports that she has been to 3 different doctors " and nobody can tell me what's wrong". Associated symptoms included fever and abdominal pain. The patient denies any modifying factors. Patient denies chills, CP, cough, brooks, pnd, orthopnea, palpitations, diaphoresis, headache, blurred vision, numbness, tingling, dizziness, localized weakness, n/v/d, blood in stools, melena, hematemesis, urinary frequency, urgency, dysuria or hematuria. Initial troponin were negative. The patient was noted to have a UTI on UA. Will admit to observation, rule out ACS and treat UTI.     Hospital Course:   She was started on IV Cipro and IV Flagyl with improvement in abdominal pain. CT negative for PE. Blood Culture NGTD. Urine culture pending. Rx for po cipro and flagyl. Patient seen and examined and deemed stable for d/c.    * No surgery found *      Indwelling Lines/Drains at time of discharge:   Lines/Drains/Airways          No matching active lines, drains, or airways          Consults: NONE    Significant Diagnostic Studies: Labs:   CMP   Recent Labs  Lab 05/10/17  1617 05/11/17  1200 05/12/17  0632    142 141   K 4.2 3.7 4.6    104 104   CO2 26 27 30*    108 111*   BUN 16 22 12   CREATININE 0.8 1.0 0.7   CALCIUM 10.0 10.0 9.3   PROT 7.7 7.6 6.5   ALBUMIN 3.7 3.6 2.9*   BILITOT 1.0 0.9 0.6   ALKPHOS 88 81 68   AST 39 21 14   ALT 45* 31 21   ANIONGAP 12 11 7*   ESTGFRAFRICA >60.0 >60 >60   EGFRNONAA >60.0 54* >60    and CBC   Recent " Labs  Lab 05/10/17  1617 05/11/17  1200 05/12/17  0632   WBC 12.92* 13.76* 10.39   HGB 14.1 14.0 12.4   HCT 43.4 42.2 38.3    237 217       Pending Diagnostic Studies:     Procedure Component Value Units Date/Time    Hemoglobin A1c if not done in past 6 weeks [[id number]] Collected:  05/11/17 1645    Order Status:  Sent Lab Status:  In process Updated:  05/11/17 1832    Specimen:  Blood from Blood         Final Active Diagnoses:    Diagnosis Date Noted POA    PRINCIPAL PROBLEM:  Abdominal pain [R10.9] 05/11/2017 Yes    Benign essential HTN [I10] 05/11/2017 Unknown    Type 2 diabetes mellitus without complication [E11.9] 05/11/2017 Unknown    Urinary tract infection without hematuria [N39.0] 05/11/2017 Unknown    Elevated d-dimer [R79.89] 05/11/2017 Unknown      Problems Resolved During this Admission:    Diagnosis Date Noted Date Resolved POA      No new Assessment & Plan notes have been filed under this hospital service since the last note was generated.  Service: Hospital Medicine      Discharged Condition: stable    Disposition: Home or Self Care    Follow Up:  Follow-up Information     [facility] up with [facility]. Schedule an appointment as soon as possible for a visit in 1 week.        Patient Instructions:     Diet general     Activity as tolerated     Call MD for:  increased confusion or weakness     Call MD for:  persistent dizziness, light-headedness, or visual disturbances     Call MD for:  worsening rash     Call MD for:  severe persistent headache     Call MD for:  difficulty breathing or increased cough     Call MD for:  redness, tenderness, or signs of infection (pain, swelling, redness, odor or green/yellow discharge around incision site)     Call MD for:  severe uncontrolled pain     Call MD for:  persistent nausea and vomiting or diarrhea     Call MD for:  temperature >100.4       Medications:  Reconciled Home Medications:   Discharge Medication List as of 5/12/2017 10:55 AM      START  taking these medications    Details   ciprofloxacin HCl (CIPRO) 500 MG tablet Take 1 tablet (500 mg total) by mouth every 12 (twelve) hours., Starting 5/12/2017, Until Sun 5/21/17, Normal      metronidazole (FLAGYL) 500 MG tablet Take 1 tablet (500 mg total) by mouth every 8 (eight) hours., Starting 5/12/2017, Until Discontinued, Normal         CONTINUE these medications which have CHANGED    Details   tizanidine (ZANAFLEX) 4 MG tablet Take 1 tablet (4 mg total) by mouth every 6 (six) hours as needed (HOLD while on CIPRO)., Starting 5/12/2017, Until Discontinued, No Print         CONTINUE these medications which have NOT CHANGED    Details   amlodipine (NORVASC) 10 MG tablet Starting 4/12/2017, Until Discontinued, Historical Med      hydrocodone-acetaminophen 7.5-325mg (NORCO) 7.5-325 mg per tablet Take 1 tablet by mouth every 4 (four) hours as needed for Pain., Starting 5/9/2017, Until Discontinued, Print      lisinopril (PRINIVIL,ZESTRIL) 20 MG tablet Take 20 mg by mouth once daily., Until Discontinued, Historical Med      ondansetron (ZOFRAN) 4 MG tablet Take 1 tablet (4 mg total) by mouth every 8 (eight) hours as needed for Nausea., Starting 5/9/2017, Until Discontinued, Print      pantoprazole (PROTONIX) 40 MG tablet Starting 5/8/2017, Until Discontinued, Historical Med           Time spent on the discharge of patient: 45 minutes    Myranda Turner NP  Department of Hospital Medicine  [facility]

## 2017-05-12 NOTE — PLAN OF CARE
Problem: Patient Care Overview  Goal: Plan of Care Review  Outcome: Ongoing (interventions implemented as appropriate)  Fall precautions maintained and no falls on shift  Patient c/o abd pain Morphine 2 mg given and pain mildly relieved  Accu check ACHS  Patient reports diarrhea at home; fluids ordered for patient

## 2017-05-12 NOTE — PROGRESS NOTES
Pt discharged per MD orders.  IV and tele monitor removed.  Discharge instructions given to Pt.  Pt verbalized understanding.  Pt transported to private vehicle via wheelchair.  Pt tolerated well.

## 2017-05-12 NOTE — PLAN OF CARE
Problem: Patient Care Overview  Goal: Plan of Care Review  Outcome: Ongoing (interventions implemented as appropriate)  Free of falls/injury during shift  Pain managed effectively w/ PRN meds  CTA negative  Continuous IVFs infusing  Serial troponin negative  DM monitored and managed  NSR on telemetry  Safety interventions in place

## 2017-05-13 ENCOUNTER — HOSPITAL ENCOUNTER (EMERGENCY)
Facility: HOSPITAL | Age: 77
Discharge: HOME | End: 2017-05-13
Attending: EMERGENCY MEDICINE
Payer: MEDICARE

## 2017-05-13 VITALS
DIASTOLIC BLOOD PRESSURE: 70 MMHG | BODY MASS INDEX: 28.7 KG/M2 | HEIGHT: 61 IN | TEMPERATURE: 99 F | SYSTOLIC BLOOD PRESSURE: 146 MMHG | WEIGHT: 152 LBS | OXYGEN SATURATION: 95 % | RESPIRATION RATE: 18 BRPM | HEART RATE: 76 BPM

## 2017-05-13 DIAGNOSIS — M62.830 BACK SPASM: ICD-10-CM

## 2017-05-13 DIAGNOSIS — S29.012A UPPER BACK STRAIN, INITIAL ENCOUNTER: Primary | ICD-10-CM

## 2017-05-13 LAB
BACTERIA UR CULT: NORMAL
BACTERIA UR CULT: NORMAL
ESTIMATED AVG GLUCOSE: 114 MG/DL
HBA1C MFR BLD HPLC: 5.6 %

## 2017-05-13 PROCEDURE — 99283 EMERGENCY DEPT VISIT LOW MDM: CPT

## 2017-05-13 RX ORDER — CYCLOBENZAPRINE HCL 10 MG
10 TABLET ORAL 3 TIMES DAILY PRN
Qty: 15 TABLET | Refills: 0 | Status: SHIPPED | OUTPATIENT
Start: 2017-05-13 | End: 2017-05-18

## 2017-05-13 NOTE — ED AVS SNAPSHOT
[facility]  [address] [facility] [address]               Leslie Wood   2017  7:04 PM   ED    Description:  Female : 1940   Department:  [facility]            Your Care was Coordinated By:     Provider Role From To    Elia Valverde Jr., MD Attending Provider 17 [phone] --      Reason for Visit     Back Pain           Diagnoses this Visit        Comments    Upper back strain, initial encounter    -  Primary     Back spasm           ED Disposition     ED Disposition Condition Comment    Discharge  Regarding BACK PAIN, I advised patient to rest and slowly start to increase activity as pain decreases or as tolerable.  Also recommend the use of ice and heat. Explained to patient that ice will help decrease swelling and pain and prevent tissue damage  (verbalized importance of covering ice with a towel and not applying it directly to skin and applying it for 20-30 minutes every 2 hours as needed). Further explained that heat will help decrease pain and muscle spasms (instructed patient to not fall asl eep with heating pad and to apply heat to lower back for 20-30 minutes every 2 hours as needed). For prevention, I recommended that the patient use correct body movements (bend at the hips and knees when picking up objects and use leg muscles as you lift  the load; keep objects close to chest when lifting it; and avoid twisting or lifting anything above the waist; change position often when standing for long periods of time; never reach, pull, or push while seated; exercise frequently and warm up before  exercising; and maintain a healthy weight.  Follow up with primary care provider if no improvement is noticed in next three days.  Take all medications as prescribed and avoid operating heavy machinery or driving if prescribed pain medications or muscle  relaxants.  Instructed patient to return to the emergency department if they develop  incontinence, notice increased swelling or pain in lower back; begin to have difficulty moving lower extremities; or develop numbness to legs.              To Do List           Follow-up Information     Follow up with Adi Almeida MD. Schedule an appointment as soon as possible for a visit in 1 week.    Specialty:  Internal Medicine    Contact information:    [address] [facility] [address] [phone]          Follow up with [facility]    Specialty:  Emergency Medicine    Why:  As needed, If symptoms worsen    Contact information:    [address] [facility] [address] [phone]       These Medications        Disp Refills Start End    cyclobenzaprine (FLEXERIL) 10 MG tablet 15 tablet 0 5/13/2017 5/18/2017    Take 1 tablet (10 mg total) by mouth 3 (three) times daily as needed for Muscle spasms. - Oral    Pharmacy: [address]  #: [phone]         [facility]     [facility] Care Line - 24/7 Assistance  Unless otherwise directed by your provider, please contact [facility], our nurse care line that is available for 24/7 assistance.     Registered nurses in the [facility] provide: appointment scheduling, clinical advisement, health education, and other advisory services.  Call: [phone] (toll free)               Medications           Message regarding Medications     Verify the changes and/or additions to your medication regime listed below are the same as discussed with your clinician today.  If any of these changes or additions are incorrect, please notify your healthcare provider.        START taking these NEW medications        Refills    cyclobenzaprine (FLEXERIL) 10 MG tablet 0    Sig: Take 1 tablet (10 mg total) by mouth 3 (three) times daily as needed for Muscle spasms.    Class: Print    Route: Oral           Verify that the below list of medications is an  "accurate representation of the medications you are currently taking.  If none reported, the list may be blank. If incorrect, please contact your healthcare provider. Carry this list with you in case of emergency.           Current Medications     amlodipine (NORVASC) 10 MG tablet     ciprofloxacin HCl (CIPRO) 500 MG tablet Take 1 tablet (500 mg total) by mouth every 12 (twelve) hours.    cyclobenzaprine (FLEXERIL) 10 MG tablet Take 1 tablet (10 mg total) by mouth 3 (three) times daily as needed for Muscle spasms.    hydrocodone-acetaminophen 7.5-325mg (NORCO) 7.5-325 mg per tablet Take 1 tablet by mouth every 4 (four) hours as needed for Pain.    lisinopril (PRINIVIL,ZESTRIL) 20 MG tablet Take 20 mg by mouth once daily.    metronidazole (FLAGYL) 500 MG tablet Take 1 tablet (500 mg total) by mouth every 8 (eight) hours.    ondansetron (ZOFRAN) 4 MG tablet Take 1 tablet (4 mg total) by mouth every 8 (eight) hours as needed for Nausea.    pantoprazole (PROTONIX) 40 MG tablet     tizanidine (ZANAFLEX) 4 MG tablet Take 1 tablet (4 mg total) by mouth every 6 (six) hours as needed (HOLD while on CIPRO).           Clinical Reference Information           Your Vitals Were     BP Pulse Temp Resp Height Weight    157/71 80 98.9 °F (37.2 °C) 20 5' 1" (1.549 m) 68.9 kg (152 lb)    SpO2 BMI             94% 28.72 kg/m2         Allergies as of 5/13/2017        Reactions    Percocet [Oxycodone-acetaminophen] Other (See Comments)    Seizures    Codeine Nausea Only, Rash      Immunizations Administered on Date of Encounter - 5/13/2017     None      ED Micro, Lab, POCT     None      ED Imaging Orders     None        Discharge Instructions         Back Sprain or Strain    Injury to the muscles (strain) or ligaments (sprain) around the spine can be troubling. Injury may occur after a sudden forceful twisting or bending force such as in a car accident, after a simple awkward movement, or after lifting something heavy with poor body " positioning. In any case, muscle spasm is often present and adds to the pain.  Thankfully, most people feel better in 1 to 2 weeks, and most of the rest in 1 to 2 months. Most people can remain active. Unless you had a forceful or traumatic physical injury such as a car accident or fall, X-rays may not be ordered for the first evaluation of a back sprain or strain. If pain continues and does not respond to medical treatment, your healthcare provider may then order X-rays and other tests.  Home care  The following guidelines will help you care for your injury at home:  · When in bed, try to find a comfortable position. A firm mattress is best. Try lying flat on your back with pillows under your knees. You can also try lying on your side with your knees bent up toward your chest and a pillow between your knees.  · Don't sit for long periods. Try not to take long car rides or take other trips that have you sitting for a long time. This puts more stress on the lower back than standing or walking.  · During the first 24 to 72 hours after an injury or flare-up, apply an ice pack to the painful area for 20 minutes. Then remove it for 20 minutes. Do this for 60 to 90 minutes, or several times a day. This will reduce swelling and pain. Be sure to wrap the ice pack in a thin towel or plastic to protect your skin.  · You can start with ice, then switch to heat. Heat from a hot shower, hot bath, or heating pad reduces pain and works well for muscle spasms. Put heat on the painful area for 20 minutes, then remove for 20 minutes. Do this for 60 to 90 minutes, or several times a day. Do not use a heating pad while sleeping. It can burn the skin.  · You can alternate the ice and heat. Talk with your healthcare provider to find out the best treatment or therapy for your back pain.  · Therapeutic massage will help relax the back muscles without stretching them.  · Be aware of safe lifting methods. Do not lift anything over 15 pounds  until all of the pain is gone.  Medicines  Talk to your healthcare provider before using medicines, especially if you have other health problems or are taking other medicines.  · You may use acetaminophen or ibuprofen to control pain, unless another pain medicine was prescribed. If you have chronic conditions like diabetes, liver or kidney disease, stomach ulcers, or gastrointestinal bleeding, or are taking blood-thinner medicines, talk with your doctor before taking any medicines.  · Be careful if you are given prescription medicines, narcotics, or medicine for muscle spasm. They can cause drowsiness, and affect your coordination, reflexes, and judgment. Do not drive or operate heavy machinery when taking these types of medicines. Only take pain medicine as prescribed by your healthcare provider.  Follow-up care  Follow up with your healthcare provider, or as advised. You may need physical therapy or more tests if your symptoms get worse.  If you had X-rays your healthcare provider may be checking for any broken bones, breaks, or fractures. Bruises and sprains can sometimes hurt as much as a fracture. These injuries can take time to heal completely. If your symptoms dont improve or they get worse, talk with your healthcare provider. You may need a repeat X-ray or other tests.  Call 911  Call for emergency care if any of the following occur:  · Trouble breathing  · Confused  · Very drowsy or trouble awakening  · Fainting or loss of consciousness  · Rapid or very slow heart rate  · Loss of bowel or bladder control  When to seek medical advice  Call your healthcare provider right away if any of the following occur:  · Pain gets worse or spreads to your arms or legs  · Weakness or numbness in one or both arms or legs  · Numbness in the groin or genital area  Date Last Reviewed: 6/1/2016 © 2000-2016 [employer]. [address] [place] [address]. All rights reserved. This information is not  intended as a substitute for professional medical care. Always follow your healthcare professional's instructions.          Back Spasm (No Trauma)    Spasm of the back muscles can occur after a sudden forceful twisting or bending force (such as in a car accident), after a simple awkward movement, or after lifting something heavy with poor body positioning. In any case, muscle spasm adds to the pain. Sleeping in an awkward position or on a poor quality mattress can also cause this. Some people respond to emotional stress by tensing the muscles of their back.  Pain that continues may need further evaluation or other types of treatment such as physical therapy.  You don't always need X-rays for the initial evaluation of back pain, unless you had a physical injury such as from a car accident or fall. If your pain continues and doesn't respond to medical treatment, X-rays and other tests may then be done.   Home care  · As soon as possible, start sitting or walking again to avoid problems from prolonged bed rest (muscle weakness, worsening back stiffness and pain, blood clots in the legs).  · When in bed, try to find a position of comfort. A firm mattress is best. Try lying flat on your back with pillows under your knees. You can also try lying on your side with your knees bent up toward your chest and a pillow between your knees.  · Avoid prolonged sitting, long car rides, or travel. This puts more stress on the lower back than standing or walking.   · During the first 24 to 72 hours after an injury or flare-up, apply an ice pack to the painful area for 20 minutes, then remove it for 20 minutes. Do this over a period of 60 to 90 minutes or several times a day. This will reduce swelling and pain. Always wrap ice packs in a thin towel.  · You can start with ice, then switch to heat. Heat (hot shower, hot bath, or heating pad) reduces pain, and works well for muscle spasms. Apply heat to the painful area for 20 minutes,  then remove it for 20 minutes. Do this over a period of 60 to 90 minutes or several times a day. Do not sleep on a heating pad as it can burn or damage skin.  · Alternate ice and heat therapies.  · Be aware of safe lifting methods and do not lift anything over 15 pounds until all the pain is gone.  Gentle stretching will help your back heal faster. Do this simple routine 2 to 3 times a day until your back is feeling better.  · Lie on your back with your knees bent and both feet on the ground  · Slowly raise your left knee to your chest as you flatten your lower back against the floor. Hold for 20 to 30 seconds.  · Relax and repeat the exercise with your right knee.  · Do 2 to 3 of these exercises for each leg.  · Repeat, hugging both knees to your chest at the same time.  · Do not bounce, but use a gentle pull.  Medicines  Talk to your doctor before using medicine, especially if you have other medical problems or are taking other medicines.  You may use acetaminophen or ibuprofen to control pain, unless your healthcare provider prescribed another pain medicine. If you have a chronic condition such as diabetes, liver or kidney disease, stomach ulcer, or gastrointestinal bleeding, or are taking blood thinners, talk with your healthcare provider before taking any medicines.  Be careful if you are given prescription pain medicine, narcotics, or medicine for muscle spasm. They can cause drowsiness, affect your coordination, reflexes, or judgment. Do not drive or operate heavy machinery when taking these medicines. Take pain medicine only as prescribed by your healthcare provider.  Follow-up care  Follow up with your doctor, or as advised. Physical therapy or further tests may be needed.  If X-rays were taken, they may be reviewed by a radiologist. You will be notified of any new findings that may affect your care.  Call 911  Seek emergency medical care if any of these occur:  · Trouble  breathing  · Confusion  · Drowsiness or trouble awakening  · Fainting or loss of consciousness  · Rapid or very slow heart rate  · Loss of bowel or bladder control  When to seek medical advice  Call your healthcare provider right away if any of these occur:  · Pain becomes worse or spreads to your legs  · Weakness or numbness in one or both legs  · Numbness in the groin or genital area  · Unexplained fever over 100.4ºF (38.0ºC)  · Burning or pain when passing urine  Date Last Reviewed: 6/1/2016 © 2000-2016 [employer]. [address] [place] [address]. All rights reserved. This information is not intended as a substitute for professional medical care. Always follow your healthcare professional's instructions.          Muscle Spasm  A muscle spasm (also called a cramp) is an involuntary muscle contraction. The muscle tightens quickly and strongly. A hard lump may form in the muscle. Muscle spasms are very painful. Read on to learn more about muscle spasms and how to treat and prevent them.    What causes muscles to spasm?  Often, the cause of a muscle spasm is not known. Muscle spasm is due to irritation of muscle fibers. Some things can make a muscle spasm more likely. These include:  · Injury  · Heavy exercise  · Overtired muscles  · A muscle held in one position for a long time  · Dehydration  · Low levels of certain minerals in the body  · Taking certain medications, such as diuretics or water pills  · Certain medical conditions, such as kidney failure or diabetes  · Being pregnant  Stopping a muscle spasm  Muscle spasms often come and go quickly. When a muscle goes into spasm, very gently stretch and massage the muscle. This may help calm the muscle fibers. Then rest the muscle.  Preventing muscle spasms  Although there is little or no evidence that staying hydrated, taking certain vitamins or minerals or stretching works to prevent cramps, these measures may help and have other benefits.  Talk to your health care provider about steps to take to avoid muscle spasms. These may include:  · Drinking enough fluids to avoid dehydration, especially when you exercise.  · Taking vitamin or mineral supplements.  · Getting regular exercise.  · Stretching regularly, especially before exercise.  · Limit caffeine and smoking.  · Taking a prescription muscle relaxant.  When to call your doctor  Call your doctor if you have any of the following:  · Severe cramping  · Cramping that lasts a long time, does not go away with stretching, or keeps coming back  · Pain, tingling, or weakness in the arms or legs  · Pain that wakes you up at night   Date Last Reviewed: 9/1/2015  © 8229-5704 [employer]. [address] [place] [address], [place] [address]. All rights reserved. This information is not intended as a substitute for professional medical care. Always follow your healthcare professional's instructions.          Muscle Spasm  A muscle spasm is a sudden tightening of the muscle you cant control. This may be caused by strain, overworking the muscle, or injury. It can also be caused by dehydration, electrolyte imbalance, diabetes, alcohol use, and certain medicines. If it goes on long enough the muscle spasm causes pain. Common areas for muscle spasm are the legs, neck, and back.  Home care  · Heat, massage, and stretching will help relax muscle spasm.  · When the spasm is in your arm or leg, stretch the muscle passively. To do this, have someone bend or straighten the joint above or below the muscle until you feel the stretch on the sore muscle. You can stretch the muscle actively by moving the affected body part. This will stretch the muscle that is in spasm. For example, if the spasm is in your calf, bend the ankle so your toes point upward toward your knee. This will stretch your calf muscle.  · You may use over-the-counter pain medicine to control pain, unless another medicine was prescribed. If you have  chronic liver or kidney disease or ever had a stomach ulcer or GI bleeding, talk with your healthcare provider before using these medicines.  Follow-up care  Follow up with your healthcare provider, or as advised.    When to seek medical advice  Call your healthcare provider right away if any of the following occur:  · Fingers or toes become swollen, cold, blue, numb, or tingly  · You develop weakness in the affected arm or leg  · Pain increases and is not controlled by the above measures  Date Last Reviewed: 11/21/2015  © 4256-9063 [employer]. [address] [place] [address]. All rights reserved. This information is not intended as a substitute for professional medical care. Always follow your healthcare professional's instructions.          Your Scheduled Appointments     May 15, 2017  8:20 AM CDT   New Patient with [facility] - Gastroenterology ([facility])    [address] [place] [address] [phone]              [facility] Sign-Up     Activating your [facility] account is as easy as 1-2-3!     1) Visit [email or web address], select Sign Up Now, enter this activation code and your date of birth, then select Next.  [email or web address]  Expires: 6/23/2017 10:28 PM      2) Create a username and password to use when you visit [facility] in the future and select a security question in case you lose your password and select Next.    3) Enter your e-mail address and click Sign Up!    Additional Information  If you have questions, please e-mail [email or web address] or call [phone] to talk to our [facility] staff. Remember, [facility] is NOT to be used for urgent needs. For medical emergencies, dial 911.          [facility] complies with applicable Federal civil rights laws and does not discriminate on the basis of race, color, national origin, age, disability, or sex.        Language Assistance Services     ATTENTION: Language  assistance services are available, free of charge. Please call [phone].      ATENCIÓN: Si habla español, tiene a montague disposición servicios gratuitos de asistencia lingüística. Llame al [phone].     CHÚ Ý: N?u b?n nói Ti?ng Vi?t, có các d?ch v? h? tr? ngôn ng? mi?n phí dành cho b?n. G?i s? [phone].

## 2017-05-14 NOTE — ED PROVIDER NOTES
SCRIBE #1 NOTE: I, Daniel Gimenez, am scribing for, and in the presence of, Elia Valverde Jr., MD. I have scribed the entire note.      History      Chief Complaint   Patient presents with    Back Pain     ongoing, pt d/c from [facility] yesterday       Review of patient's allergies indicates:   Allergen Reactions    Percocet [oxycodone-acetaminophen] Other (See Comments)     Seizures    Codeine Nausea Only and Rash        HPI   HPI    5/13/2017, 7:25 PM   History obtained from the patient      History of Present Illness: Leslie Wood is a 76 y.o. female patient who presents to the Emergency Department for left upper back pain which onset gradually 12 hours PTA. Symptoms are constant and moderate in severity. PT took Norco-10s, Voltaren gel, and heat with mild relief. No mitigating or exacerbating factors reported. No associated sx reported. Patient denies any fever, chills, numbness, weakness, HA, lightheadedness, dizziness, CP, SOB, n/v/d, and all other sxs at this time. No further complaints or concerns at this time.         Arrival mode: Personal vehicle     PCP: Adi Almeida MD       Past Medical History:  Past Medical History:   Diagnosis Date    Arthritis     Cataract     Diabetes mellitus     controled with diet    GERD (gastroesophageal reflux disease)     Hypertension        Past Surgical History:  Past Surgical History:   Procedure Laterality Date    ADENOIDECTOMY      ADRENAL GLAND SURGERY      APPENDECTOMY      BACK SURGERY      fusion l 4-5 s 1,2,3  fusion l 2-3    EYE SURGERY      HEMORRHOID SURGERY      HERNIA REPAIR      HYSTERECTOMY      TONSILLECTOMY           Family History:  Family History   Problem Relation Age of Onset    Heart disease Mother     Hypertension Mother     Diabetes Mother     Hypertension Father     Kidney disease Father        Social History:  Social History     Social History Main Topics    Smoking status: Never Smoker    Smokeless tobacco: Never Used     Alcohol use No    Drug use: No    Sexual activity: Not Currently       ROS   Review of Systems   Constitutional: Negative for chills and fever.   HENT: Negative for sore throat.    Respiratory: Negative for shortness of breath.    Cardiovascular: Negative for chest pain.   Gastrointestinal: Negative for abdominal pain, diarrhea, nausea and vomiting.   Genitourinary: Negative for dysuria.   Musculoskeletal: Positive for back pain (left upper).   Skin: Negative for rash.   Neurological: Negative for dizziness, weakness, light-headedness, numbness and headaches.   Hematological: Does not bruise/bleed easily.       Physical Exam    Initial Vitals   BP Pulse Resp Temp SpO2   05/13/17 1902 05/13/17 1902 05/13/17 1902 05/13/17 1902 05/13/17 1902   157/71 80 20 98.9 °F (37.2 °C) 94 %      Physical Exam  Nursing Notes and Vital Signs Reviewed.  Constitutional: Patient is in no acute distress. Awake and alert. Well-developed and well-nourished.  Head: Atraumatic. Normocephalic.  Eyes: PERRL. EOM intact. Conjunctivae are not pale. No scleral icterus.  ENT: Mucous membranes are moist. Oropharynx is clear and symmetric.    Cardiovascular: Regular rate. Regular rhythm. No murmurs, rubs, or gallops. Distal pulses are 2+ and symmetric.  Pulmonary/Chest: No respiratory distress. Clear to auscultation bilaterally. No wheezing, rales, or rhonchi.  Abdominal: Soft and non-distended.  There is no tenderness.  No rebound, guarding, or rigidity.   Musculoskeletal: Moves all extremities. No obvious deformities. No edema.  Neck: Supple. No cervical midline bony tenderness, deformities, or step-offs.   Back: Left trapezius, infraspinatus, and thoracic paraspinal muscle tenderness. No midline bony tenderness, deformities, or step-offs of the T-spine or L-spine. Skin appears normal without abrasions or bruising. No erythema, induration, or fluctuance.   Neurological: Awake and alert. Appropriate for age.  No light touch sensory deficit.  "Normal gait. No acute focal neurological deficits noted.  Skin: Warm and dry.  Psychiatric: Normal affect. Good eye contact. Appropriate in content.    ED Course    Procedures  ED Vital Signs:  Vitals:    05/13/17 1902 05/13/17 1944   BP: (!) 157/71 (!) 146/70   Pulse: 80 76   Resp: 20 18   Temp: 98.9 °F (37.2 °C)    SpO2: (!) 94% 95%   Weight: 68.9 kg (152 lb)    Height: 5' 1" (1.549 m)                 The Emergency Provider reviewed the vital signs and test results, which are outlined above.    ED Discussion     7:28 PM: Pt states she has pain medication at home. Discussed pt dx and plan of tx.  Pt states she has pain medications at home and does not need any refills. Gave pt all f/u and return to the ED instructions. All questions and concerns were addressed at this time. Pt expresses understanding of information and instructions, and is comfortable with plan to discharge. Pt is stable for discharge.    Regarding BACK PAIN, I advised patient to rest and slowly start to increase activity as pain decreases or as tolerable.  Also recommend the use of ice and heat. Explained to patient that ice will help decrease swelling and pain and prevent tissue damage (verbalized importance of covering ice with a towel and not applying it directly to skin and applying it for 20-30 minutes every 2 hours as needed). Further explained that heat will help decrease pain and muscle spasms (instructed patient to not fall asleep with heating pad and to apply heat to lower back for 20-30 minutes every 2 hours as needed). For prevention, I recommended that the patient use correct body movements (bend at the hips and knees when picking up objects and use leg muscles as you lift the load; keep objects close to chest when lifting it; and avoid twisting or lifting anything above the waist; change position often when standing for long periods of time; never reach, pull, or push while seated; exercise frequently and warm up before exercising; " and maintain a healthy weight.  Follow up with primary care provider if no improvement is noticed in next three days.  Take all medications as prescribed and avoid operating heavy machinery or driving if prescribed pain medications or muscle relaxants.  Instructed patient to return to the emergency department if they develop incontinence, notice increased swelling or pain in lower back; begin to have difficulty moving lower extremities; or develop numbness to legs.       ED Medication(s):  Medications - No data to display    Discharge Medication List as of 5/13/2017  7:42 PM      START taking these medications    Details   cyclobenzaprine (FLEXERIL) 10 MG tablet Take 1 tablet (10 mg total) by mouth 3 (three) times daily as needed for Muscle spasms., Starting 5/13/2017, Until Thu 5/18/17, Print             Follow-up Information     Follow up with Adi Almeida MD. Schedule an appointment as soon as possible for a visit in 1 week.    Specialty:  Internal Medicine    Contact information:    [address] [facility] [address] [phone]          Follow up with [facility] - .    Specialty:  Emergency Medicine    Why:  As needed, If symptoms worsen    Contact information:    [address] [facility] [address] [phone]            Medical Decision Making              Scribe Attestation:   Scribe #1: I performed the above scribed service and the documentation accurately describes the services I performed. I attest to the accuracy of the note.    Attending:   Physician Attestation Statement for Scribe #1: I, Elia Valverde Jr., MD, personally performed the services described in this documentation, as scribed by Daniel Gimenez, in my presence, and it is both accurate and complete.          Clinical Impression       ICD-10-CM ICD-9-CM   1. Upper back strain, initial encounter S29.012A 847.1   2. Back spasm M62.830 724.8       Disposition:   Disposition:  Discharged  Condition: Stable         Elia Valverde Jr., MD  05/14/17 3571

## 2017-05-14 NOTE — DISCHARGE INSTRUCTIONS
Back Sprain or Strain    Injury to the muscles (strain) or ligaments (sprain) around the spine can be troubling. Injury may occur after a sudden forceful twisting or bending force such as in a car accident, after a simple awkward movement, or after lifting something heavy with poor body positioning. In any case, muscle spasm is often present and adds to the pain.  Thankfully, most people feel better in 1 to 2 weeks, and most of the rest in 1 to 2 months. Most people can remain active. Unless you had a forceful or traumatic physical injury such as a car accident or fall, X-rays may not be ordered for the first evaluation of a back sprain or strain. If pain continues and does not respond to medical treatment, your healthcare provider may then order X-rays and other tests.  Home care  The following guidelines will help you care for your injury at home:  · When in bed, try to find a comfortable position. A firm mattress is best. Try lying flat on your back with pillows under your knees. You can also try lying on your side with your knees bent up toward your chest and a pillow between your knees.  · Don't sit for long periods. Try not to take long car rides or take other trips that have you sitting for a long time. This puts more stress on the lower back than standing or walking.  · During the first 24 to 72 hours after an injury or flare-up, apply an ice pack to the painful area for 20 minutes. Then remove it for 20 minutes. Do this for 60 to 90 minutes, or several times a day. This will reduce swelling and pain. Be sure to wrap the ice pack in a thin towel or plastic to protect your skin.  · You can start with ice, then switch to heat. Heat from a hot shower, hot bath, or heating pad reduces pain and works well for muscle spasms. Put heat on the painful area for 20 minutes, then remove for 20 minutes. Do this for 60 to 90 minutes, or several times a day. Do not use a heating pad while sleeping. It can burn the  skin.  · You can alternate the ice and heat. Talk with your healthcare provider to find out the best treatment or therapy for your back pain.  · Therapeutic massage will help relax the back muscles without stretching them.  · Be aware of safe lifting methods. Do not lift anything over 15 pounds until all of the pain is gone.  Medicines  Talk to your healthcare provider before using medicines, especially if you have other health problems or are taking other medicines.  · You may use acetaminophen or ibuprofen to control pain, unless another pain medicine was prescribed. If you have chronic conditions like diabetes, liver or kidney disease, stomach ulcers, or gastrointestinal bleeding, or are taking blood-thinner medicines, talk with your doctor before taking any medicines.  · Be careful if you are given prescription medicines, narcotics, or medicine for muscle spasm. They can cause drowsiness, and affect your coordination, reflexes, and judgment. Do not drive or operate heavy machinery when taking these types of medicines. Only take pain medicine as prescribed by your healthcare provider.  Follow-up care  Follow up with your healthcare provider, or as advised. You may need physical therapy or more tests if your symptoms get worse.  If you had X-rays your healthcare provider may be checking for any broken bones, breaks, or fractures. Bruises and sprains can sometimes hurt as much as a fracture. These injuries can take time to heal completely. If your symptoms dont improve or they get worse, talk with your healthcare provider. You may need a repeat X-ray or other tests.  Call 911  Call for emergency care if any of the following occur:  · Trouble breathing  · Confused  · Very drowsy or trouble awakening  · Fainting or loss of consciousness  · Rapid or very slow heart rate  · Loss of bowel or bladder control  When to seek medical advice  Call your healthcare provider right away if any of the following occur:  · Pain  gets worse or spreads to your arms or legs  · Weakness or numbness in one or both arms or legs  · Numbness in the groin or genital area  Date Last Reviewed: 6/1/2016 © 2000-2016 [employer]. [address] [place] [address], [place] [address]. All rights reserved. This information is not intended as a substitute for professional medical care. Always follow your healthcare professional's instructions.          Back Spasm (No Trauma)    Spasm of the back muscles can occur after a sudden forceful twisting or bending force (such as in a car accident), after a simple awkward movement, or after lifting something heavy with poor body positioning. In any case, muscle spasm adds to the pain. Sleeping in an awkward position or on a poor quality mattress can also cause this. Some people respond to emotional stress by tensing the muscles of their back.  Pain that continues may need further evaluation or other types of treatment such as physical therapy.  You don't always need X-rays for the initial evaluation of back pain, unless you had a physical injury such as from a car accident or fall. If your pain continues and doesn't respond to medical treatment, X-rays and other tests may then be done.   Home care  · As soon as possible, start sitting or walking again to avoid problems from prolonged bed rest (muscle weakness, worsening back stiffness and pain, blood clots in the legs).  · When in bed, try to find a position of comfort. A firm mattress is best. Try lying flat on your back with pillows under your knees. You can also try lying on your side with your knees bent up toward your chest and a pillow between your knees.  · Avoid prolonged sitting, long car rides, or travel. This puts more stress on the lower back than standing or walking.   · During the first 24 to 72 hours after an injury or flare-up, apply an ice pack to the painful area for 20 minutes, then remove it for 20 minutes. Do this over a period of 60 to 90  minutes or several times a day. This will reduce swelling and pain. Always wrap ice packs in a thin towel.  · You can start with ice, then switch to heat. Heat (hot shower, hot bath, or heating pad) reduces pain, and works well for muscle spasms. Apply heat to the painful area for 20 minutes, then remove it for 20 minutes. Do this over a period of 60 to 90 minutes or several times a day. Do not sleep on a heating pad as it can burn or damage skin.  · Alternate ice and heat therapies.  · Be aware of safe lifting methods and do not lift anything over 15 pounds until all the pain is gone.  Gentle stretching will help your back heal faster. Do this simple routine 2 to 3 times a day until your back is feeling better.  · Lie on your back with your knees bent and both feet on the ground  · Slowly raise your left knee to your chest as you flatten your lower back against the floor. Hold for 20 to 30 seconds.  · Relax and repeat the exercise with your right knee.  · Do 2 to 3 of these exercises for each leg.  · Repeat, hugging both knees to your chest at the same time.  · Do not bounce, but use a gentle pull.  Medicines  Talk to your doctor before using medicine, especially if you have other medical problems or are taking other medicines.  You may use acetaminophen or ibuprofen to control pain, unless your healthcare provider prescribed another pain medicine. If you have a chronic condition such as diabetes, liver or kidney disease, stomach ulcer, or gastrointestinal bleeding, or are taking blood thinners, talk with your healthcare provider before taking any medicines.  Be careful if you are given prescription pain medicine, narcotics, or medicine for muscle spasm. They can cause drowsiness, affect your coordination, reflexes, or judgment. Do not drive or operate heavy machinery when taking these medicines. Take pain medicine only as prescribed by your healthcare provider.  Follow-up care  Follow up with your doctor, or as  advised. Physical therapy or further tests may be needed.  If X-rays were taken, they may be reviewed by a radiologist. You will be notified of any new findings that may affect your care.  Call 911  Seek emergency medical care if any of these occur:  · Trouble breathing  · Confusion  · Drowsiness or trouble awakening  · Fainting or loss of consciousness  · Rapid or very slow heart rate  · Loss of bowel or bladder control  When to seek medical advice  Call your healthcare provider right away if any of these occur:  · Pain becomes worse or spreads to your legs  · Weakness or numbness in one or both legs  · Numbness in the groin or genital area  · Unexplained fever over 100.4ºF (38.0ºC)  · Burning or pain when passing urine  Date Last Reviewed: 6/1/2016 © 2000-2016 [employer]. [address] [place] [address]. All rights reserved. This information is not intended as a substitute for professional medical care. Always follow your healthcare professional's instructions.          Muscle Spasm  A muscle spasm (also called a cramp) is an involuntary muscle contraction. The muscle tightens quickly and strongly. A hard lump may form in the muscle. Muscle spasms are very painful. Read on to learn more about muscle spasms and how to treat and prevent them.    What causes muscles to spasm?  Often, the cause of a muscle spasm is not known. Muscle spasm is due to irritation of muscle fibers. Some things can make a muscle spasm more likely. These include:  · Injury  · Heavy exercise  · Overtired muscles  · A muscle held in one position for a long time  · Dehydration  · Low levels of certain minerals in the body  · Taking certain medications, such as diuretics or water pills  · Certain medical conditions, such as kidney failure or diabetes  · Being pregnant  Stopping a muscle spasm  Muscle spasms often come and go quickly. When a muscle goes into spasm, very gently stretch and massage the muscle. This may  help calm the muscle fibers. Then rest the muscle.  Preventing muscle spasms  Although there is little or no evidence that staying hydrated, taking certain vitamins or minerals or stretching works to prevent cramps, these measures may help and have other benefits. Talk to your health care provider about steps to take to avoid muscle spasms. These may include:  · Drinking enough fluids to avoid dehydration, especially when you exercise.  · Taking vitamin or mineral supplements.  · Getting regular exercise.  · Stretching regularly, especially before exercise.  · Limit caffeine and smoking.  · Taking a prescription muscle relaxant.  When to call your doctor  Call your doctor if you have any of the following:  · Severe cramping  · Cramping that lasts a long time, does not go away with stretching, or keeps coming back  · Pain, tingling, or weakness in the arms or legs  · Pain that wakes you up at night   Date Last Reviewed: 9/1/2015  © 9965-1302 [employer]. [address] [place] [address]. All rights reserved. This information is not intended as a substitute for professional medical care. Always follow your healthcare professional's instructions.          Muscle Spasm  A muscle spasm is a sudden tightening of the muscle you cant control. This may be caused by strain, overworking the muscle, or injury. It can also be caused by dehydration, electrolyte imbalance, diabetes, alcohol use, and certain medicines. If it goes on long enough the muscle spasm causes pain. Common areas for muscle spasm are the legs, neck, and back.  Home care  · Heat, massage, and stretching will help relax muscle spasm.  · When the spasm is in your arm or leg, stretch the muscle passively. To do this, have someone bend or straighten the joint above or below the muscle until you feel the stretch on the sore muscle. You can stretch the muscle actively by moving the affected body part. This will stretch the muscle that is in  spasm. For example, if the spasm is in your calf, bend the ankle so your toes point upward toward your knee. This will stretch your calf muscle.  · You may use over-the-counter pain medicine to control pain, unless another medicine was prescribed. If you have chronic liver or kidney disease or ever had a stomach ulcer or GI bleeding, talk with your healthcare provider before using these medicines.  Follow-up care  Follow up with your healthcare provider, or as advised.    When to seek medical advice  Call your healthcare provider right away if any of the following occur:  · Fingers or toes become swollen, cold, blue, numb, or tingly  · You develop weakness in the affected arm or leg  · Pain increases and is not controlled by the above measures  Date Last Reviewed: 11/21/2015  © 7051-5860 [employer]. [address] [place] [address], [place] [address]. All rights reserved. This information is not intended as a substitute for professional medical care. Always follow your healthcare professional's instructions.

## 2017-05-15 ENCOUNTER — OFFICE VISIT (OUTPATIENT)
Dept: GASTROENTEROLOGY | Facility: CLINIC | Age: 77
End: 2017-05-15
Payer: MEDICARE

## 2017-05-15 VITALS
SYSTOLIC BLOOD PRESSURE: 152 MMHG | WEIGHT: 150.56 LBS | HEIGHT: 61 IN | DIASTOLIC BLOOD PRESSURE: 72 MMHG | BODY MASS INDEX: 28.42 KG/M2 | HEART RATE: 80 BPM

## 2017-05-15 DIAGNOSIS — K59.00 CONSTIPATION, UNSPECIFIED CONSTIPATION TYPE: ICD-10-CM

## 2017-05-15 DIAGNOSIS — R07.1 PAINFUL BREATHING: ICD-10-CM

## 2017-05-15 DIAGNOSIS — R07.89 ATYPICAL CHEST PAIN: Primary | ICD-10-CM

## 2017-05-15 DIAGNOSIS — R10.12 LUQ PAIN: ICD-10-CM

## 2017-05-15 PROCEDURE — 99999 PR PBB SHADOW E&M-EST. PATIENT-LVL III: CPT | Mod: PBBFAC,,, | Performed by: NURSE PRACTITIONER

## 2017-05-15 PROCEDURE — 99204 OFFICE O/P NEW MOD 45 MIN: CPT | Mod: S$PBB,,, | Performed by: NURSE PRACTITIONER

## 2017-05-15 PROCEDURE — 99213 OFFICE O/P EST LOW 20 MIN: CPT | Mod: PBBFAC | Performed by: NURSE PRACTITIONER

## 2017-05-15 NOTE — MR AVS SNAPSHOT
[facility]  [address] [facility] [address]  Phone: [phone]  Fax: [phone]                  Leslie Wood   5/15/2017 8:20 AM   Office Visit    Description:  Female : 1940   Provider:  Angie Renae NP   Department:  [facility]           Reason for Visit     Abdominal Pain           Diagnoses this Visit        Comments    Atypical chest pain    -  Primary     LUQ pain         Painful breathing         Constipation, unspecified constipation type                To Do List           Future Appointments        Provider Department Dept Phone    2017 9:00 AM Angie Renae NP [facility] [phone]      Goals (5 Years of Data)     None      Follow-Up and Disposition     Return in about 4 weeks (around 2017).      [facility]     [facility] Nurse Care Line -  Assistance  Unless otherwise directed by your provider, please contact [facility], our nurse care line that is available for  assistance.     Registered nurses in the [facility] Center provide: appointment scheduling, clinical advisement, health education, and other advisory services.  Call: [phone] (toll free)               Medications           Message regarding Medications     Verify the changes and/or additions to your medication regime listed below are the same as discussed with your clinician today.  If any of these changes or additions are incorrect, please notify your healthcare provider.             Verify that the below list of medications is an accurate representation of the medications you are currently taking.  If none reported, the list may be blank. If incorrect, please contact your healthcare provider. Carry this list with you in case of emergency.           Current Medications     amlodipine (NORVASC) 10 MG tablet Take 10 mg by mouth once daily.     ciprofloxacin HCl (CIPRO) 500 MG tablet Take 1 tablet (500 mg  "total) by mouth every 12 (twelve) hours.    hydrocodone-acetaminophen 7.5-325mg (NORCO) 7.5-325 mg per tablet Take 1 tablet by mouth every 4 (four) hours as needed for Pain.    lisinopril (PRINIVIL,ZESTRIL) 20 MG tablet Take 20 mg by mouth once daily.    metronidazole (FLAGYL) 500 MG tablet Take 1 tablet (500 mg total) by mouth every 8 (eight) hours.    pantoprazole (PROTONIX) 40 MG tablet Take 40 mg by mouth once daily.     cyclobenzaprine (FLEXERIL) 10 MG tablet Take 1 tablet (10 mg total) by mouth 3 (three) times daily as needed for Muscle spasms.    ondansetron (ZOFRAN) 4 MG tablet Take 1 tablet (4 mg total) by mouth every 8 (eight) hours as needed for Nausea.    tizanidine (ZANAFLEX) 4 MG tablet Take 1 tablet (4 mg total) by mouth every 6 (six) hours as needed (HOLD while on CIPRO).           Clinical Reference Information           Your Vitals Were     BP Pulse Height Weight BMI    152/72 80 5' 1" (1.549 m) 68.3 kg (150 lb 9.2 oz) 28.45 kg/m2      Blood Pressure          Most Recent Value    BP  (!)  152/72      Allergies as of 5/15/2017     Amitriptyline    Percocet [Oxycodone-acetaminophen]    Codeine      Immunizations Administered on Date of Encounter - 5/15/2017     None      Orders Placed During Today's Visit      Normal Orders This Visit    Case request GI: ESOPHAGOGASTRODUODENOSCOPY (EGD)       [facility] Sign-Up     Activating your [facility] account is as easy as 1-2-3!     1) Visit [email or web address], select Sign Up Now, enter this activation code and your date of birth, then select Next.  [email or web address]  Expires: 6/23/2017 10:28 PM      2) Create a username and password to use when you visit [facility] in the future and select a security question in case you lose your password and select Next.    3) Enter your e-mail address and click Sign Up!    Additional Information  If you have questions, please e-mail [email or web address] or call [phone] to talk to our [facility] staff. Remember, [facility] is " NOT to be used for urgent needs. For medical emergencies, dial 911.         Instructions    Take Miralax once to twice a day for constipation.       Language Assistance Services     ATTENTION: Language assistance services are available, free of charge. Please call [phone].      ATENCIÓN: Si habla español, tiene a montague disposición servicios gratuitos de asistencia lingüística. Llame al [phone].     CHÚ Ý: N?u b?n nói Ti?ng Vi?t, có các d?ch v? h? tr? ngôn ng? mi?n phí dành cho b?n. G?i s? [phone].         [facility] complies with applicable Federal civil rights laws and does not discriminate on the basis of race, color, national origin, age, disability, or sex.

## 2017-05-16 NOTE — PROGRESS NOTES
Clinic Consult:  [facility] Consultation Note    Reason for Consult:  The primary encounter diagnosis was Atypical chest pain. Diagnoses of LUQ pain, Painful breathing, and Constipation, unspecified constipation type were also pertinent to this visit.    PCP: Adi Ga Say       HPI:  This is a 76 y.o. female here for evaluation of the above. Patient was referred to me by Dr. Fox. Patient presents to clinic S/P evaluations by PCP, GI, and ER. She is here for further evaluation. Onset of symptoms began abruptly 8 days ago. Symptoms started as chest pain and epigastric pain that radiates to her LUQ. She reports pain felt like indigestion but was not relieved with Tums. Pain eventually moved to LUQ with radiation to the left side and back. Pain worsens with deep breathing and movement and is also associated with SOB. Over the last week, she has seen numerous providers that included PCP, Dr. Fox, [facility], and two ER visits. Originally, pleurisy was suspected. She was also started on daily PPI treatment. She saw [facility] and PPI was increased to twice a day. This did not help symptoms much. She then presented to [facility] for persistent pain. Workup included labs, Abdomen xray and CTA abdomen all which were unrevealing. She then followed up with Dr. Fox and was found to have an elevated D-dimer. She was sent to the ER and admitted for further evaluation and to R/O PE. She was placed on telemetry and underwent an echo and CTA. All of which were unrevealing as well. She presents to clinic today with continued symptoms. She reports no real improvement. Still complaining of atypical chest pain that radiates to LUQ and worsens with deep breathing and movent. She reports constipation over the last week. She was given opoid pain medication when symptoms began. She reports seeing some BRBPR on the outside of stool and when wiping. She has a history of bleeding  hemorrhoids. She reports her last colonoscopy was about 2-3 years ago. On previous colonoscopy, she had 9 polyps removed but last two screening colonoscopies were normal. She had a prior EGD with dilation at the end of last year for dysphagia.     Review of Systems   Constitutional: Negative for fever, malaise/fatigue and weight loss.   HENT: Negative for sore throat.    Respiratory: Positive for shortness of breath. Negative for cough and wheezing.    Cardiovascular: Positive for chest pain. Negative for palpitations.   Gastrointestinal: Positive for abdominal pain (LUQ) and constipation. Negative for blood in stool, diarrhea, heartburn, melena, nausea and vomiting.   Genitourinary: Negative for dysuria and frequency.   Musculoskeletal: Negative for back pain, joint pain, myalgias and neck pain.   Skin: Negative for itching and rash.   Neurological: Negative for dizziness, speech change, seizures, loss of consciousness and headaches.   Psychiatric/Behavioral: Negative for depression and substance abuse. The patient is not nervous/anxious.        Medical History:  has a past medical history of Arthritis; Cataract; Diabetes mellitus; GERD (gastroesophageal reflux disease); and Hypertension.    Surgical History:  has a past surgical history that includes Back surgery; Eye surgery; Appendectomy; Adrenal gland surgery; Hemorrhoid surgery; Hernia repair; Hysterectomy; Tonsillectomy; and Adenoidectomy.    Family History: family history includes Diabetes in her mother; Heart disease in her mother; Hypertension in her father and mother; Kidney disease in her father..     Social History:  reports that she has never smoked. She has never used smokeless tobacco. She reports that she does not drink alcohol or use illicit drugs.    Allergies: Reviewed    Home Medications:   Current Outpatient Prescriptions on File Prior to Visit   Medication Sig Dispense Refill    amlodipine (NORVASC) 10 MG tablet Take 10 mg by mouth once  "daily.       ciprofloxacin HCl (CIPRO) 500 MG tablet Take 1 tablet (500 mg total) by mouth every 12 (twelve) hours. 18 tablet 0    hydrocodone-acetaminophen 7.5-325mg (NORCO) 7.5-325 mg per tablet Take 1 tablet by mouth every 4 (four) hours as needed for Pain. 12 tablet 0    lisinopril (PRINIVIL,ZESTRIL) 20 MG tablet Take 20 mg by mouth once daily.      metronidazole (FLAGYL) 500 MG tablet Take 1 tablet (500 mg total) by mouth every 8 (eight) hours. 28 tablet 0    pantoprazole (PROTONIX) 40 MG tablet Take 40 mg by mouth once daily.       cyclobenzaprine (FLEXERIL) 10 MG tablet Take 1 tablet (10 mg total) by mouth 3 (three) times daily as needed for Muscle spasms. 15 tablet 0    ondansetron (ZOFRAN) 4 MG tablet Take 1 tablet (4 mg total) by mouth every 8 (eight) hours as needed for Nausea. 12 tablet 0    tizanidine (ZANAFLEX) 4 MG tablet Take 1 tablet (4 mg total) by mouth every 6 (six) hours as needed (HOLD while on CIPRO).       No current facility-administered medications on file prior to visit.        Physical Exam:  Vital Signs:  BP (!) 152/72  Pulse 80  Ht 5' 1" (1.549 m)  Wt 68.3 kg (150 lb 9.2 oz)  BMI 28.45 kg/m2  Body mass index is 28.45 kg/(m^2).  Physical Exam   Constitutional: She is oriented to person, place, and time and well-developed, well-nourished, and in no distress. No distress.   HENT:   Head: Normocephalic.   Eyes: Conjunctivae are normal. Pupils are equal, round, and reactive to light.   Cardiovascular: Normal rate, regular rhythm and normal heart sounds.    Pulmonary/Chest: Effort normal and breath sounds normal. No respiratory distress.   Neurological: She is alert and oriented to person, place, and time. No cranial nerve deficit.   Skin: Skin is warm and dry. No rash noted.   Psychiatric: Mood and affect normal.       Labs: Pertinent labs reviewed.    Endoscopy:  See HPI    CRC Screening: See HPI    Assessment:  1. Atypical chest pain    2. LUQ pain    3. Painful breathing    4. " Constipation, unspecified constipation type           Recommendations:  Atypical chest pain  LUQ pain  Painful breathing  - Symptoms more consistent with non-GI etiologies  - Will schedule EGD for further evaluation and R/O UGI etiology  - Patient may be due for screening colonoscopy  - Will get records from [facility]  - Continue PPI  -     Case request GI: ESOPHAGOGASTRODUODENOSCOPY (EGD)    Constipation, unspecified constipation type  - Most recent abdominal xray with moderate amount of stool noted in colon  - May be secondary to opoid pain medications but is unlikely the cause of symptoms.   - Will start Miralax once to twice a day.    Return in about 4 weeks (around 6/12/2017).      Thank you so much for allowing me to participate in the care of MILAGRO Watson

## 2017-05-17 ENCOUNTER — ANESTHESIA (OUTPATIENT)
Dept: ENDOSCOPY | Facility: HOSPITAL | Age: 77
End: 2017-05-17
Payer: MEDICARE

## 2017-05-17 ENCOUNTER — SURGERY (OUTPATIENT)
Age: 77
End: 2017-05-17

## 2017-05-17 ENCOUNTER — HOSPITAL ENCOUNTER (OUTPATIENT)
Facility: HOSPITAL | Age: 77
Discharge: HOME | End: 2017-05-17
Attending: INTERNAL MEDICINE | Admitting: INTERNAL MEDICINE
Payer: MEDICARE

## 2017-05-17 ENCOUNTER — ANESTHESIA EVENT (OUTPATIENT)
Dept: ENDOSCOPY | Facility: HOSPITAL | Age: 77
End: 2017-05-17
Payer: MEDICARE

## 2017-05-17 VITALS — RESPIRATION RATE: 24 BRPM

## 2017-05-17 DIAGNOSIS — R10.12 LEFT UPPER QUADRANT PAIN: ICD-10-CM

## 2017-05-17 DIAGNOSIS — S29.012D UPPER BACK STRAIN, SUBSEQUENT ENCOUNTER: ICD-10-CM

## 2017-05-17 DIAGNOSIS — R07.89 ATYPICAL CHEST PAIN: Primary | ICD-10-CM

## 2017-05-17 DIAGNOSIS — R79.89 ELEVATED D-DIMER: ICD-10-CM

## 2017-05-17 DIAGNOSIS — M62.830 BACK SPASM: ICD-10-CM

## 2017-05-17 DIAGNOSIS — K29.50 CHRONIC GASTRITIS WITHOUT BLEEDING, UNSPECIFIED GASTRITIS TYPE: ICD-10-CM

## 2017-05-17 DIAGNOSIS — I10 BENIGN ESSENTIAL HTN: ICD-10-CM

## 2017-05-17 LAB
BACTERIA BLD CULT: NORMAL
BACTERIA BLD CULT: NORMAL

## 2017-05-17 PROCEDURE — 88305 TISSUE EXAM BY PATHOLOGIST: CPT | Performed by: PATHOLOGY

## 2017-05-17 PROCEDURE — 27201012 HC FORCEPS, HOT/COLD, DISP: Performed by: INTERNAL MEDICINE

## 2017-05-17 PROCEDURE — 43239 EGD BIOPSY SINGLE/MULTIPLE: CPT | Mod: ,,, | Performed by: INTERNAL MEDICINE

## 2017-05-17 PROCEDURE — 25000003 PHARM REV CODE 250: Performed by: NURSE ANESTHETIST, CERTIFIED REGISTERED

## 2017-05-17 PROCEDURE — 37000009 HC ANESTHESIA EA ADD 15 MINS: Performed by: INTERNAL MEDICINE

## 2017-05-17 PROCEDURE — 88305 TISSUE EXAM BY PATHOLOGIST: CPT | Mod: 26,,, | Performed by: PATHOLOGY

## 2017-05-17 PROCEDURE — 63600175 PHARM REV CODE 636 W HCPCS: Performed by: NURSE ANESTHETIST, CERTIFIED REGISTERED

## 2017-05-17 PROCEDURE — 43239 EGD BIOPSY SINGLE/MULTIPLE: CPT | Performed by: INTERNAL MEDICINE

## 2017-05-17 PROCEDURE — 37000008 HC ANESTHESIA 1ST 15 MINUTES: Performed by: INTERNAL MEDICINE

## 2017-05-17 PROCEDURE — 25000003 PHARM REV CODE 250: Performed by: INTERNAL MEDICINE

## 2017-05-17 RX ORDER — PROPOFOL 10 MG/ML
VIAL (ML) INTRAVENOUS
Status: DISCONTINUED | OUTPATIENT
Start: 2017-05-17 | End: 2017-05-17

## 2017-05-17 RX ORDER — GLYCOPYRROLATE 0.2 MG/ML
INJECTION INTRAMUSCULAR; INTRAVENOUS
Status: DISCONTINUED | OUTPATIENT
Start: 2017-05-17 | End: 2017-05-17

## 2017-05-17 RX ORDER — SODIUM CHLORIDE, SODIUM LACTATE, POTASSIUM CHLORIDE, CALCIUM CHLORIDE 600; 310; 30; 20 MG/100ML; MG/100ML; MG/100ML; MG/100ML
INJECTION, SOLUTION INTRAVENOUS CONTINUOUS
Status: DISCONTINUED | OUTPATIENT
Start: 2017-05-17 | End: 2017-05-17 | Stop reason: HOSPADM

## 2017-05-17 RX ORDER — LIDOCAINE HYDROCHLORIDE 10 MG/ML
INJECTION INFILTRATION; PERINEURAL
Status: DISCONTINUED | OUTPATIENT
Start: 2017-05-17 | End: 2017-05-17

## 2017-05-17 RX ADMIN — GLYCOPYRROLATE 0.2 MG: 0.2 INJECTION INTRAMUSCULAR; INTRAVENOUS at 07:05

## 2017-05-17 RX ADMIN — PROPOFOL 20 MG: 10 INJECTION, EMULSION INTRAVENOUS at 07:05

## 2017-05-17 RX ADMIN — LIDOCAINE HYDROCHLORIDE 50 MG: 10 INJECTION, SOLUTION INFILTRATION; PERINEURAL at 07:05

## 2017-05-17 RX ADMIN — PROPOFOL 50 MG: 10 INJECTION, EMULSION INTRAVENOUS at 07:05

## 2017-05-17 RX ADMIN — SODIUM CHLORIDE, SODIUM LACTATE, POTASSIUM CHLORIDE, AND CALCIUM CHLORIDE: .6; .31; .03; .02 INJECTION, SOLUTION INTRAVENOUS at 07:05

## 2017-05-17 NOTE — INTERVAL H&P NOTE
The patient has been examined and the H&P has been reviewed:I have reviewed this note and I agree with this assessment. The patient was seen in the GI office and remains stable for endoscopy at the time of this present evaluation.         Anesthesia/Surgery risks, benefits and alternative options discussed and understood by patient/family.          Active Hospital Problems    Diagnosis  POA    Atypical chest pain [R07.89]  Yes      Resolved Hospital Problems    Diagnosis Date Resolved POA   No resolved problems to display.

## 2017-05-17 NOTE — TRANSFER OF CARE
"Anesthesia Transfer of Care Note    Patient: Leslie Wood    Procedure(s) Performed: Procedure(s) (LRB):  ESOPHAGOGASTRODUODENOSCOPY (EGD) (N/A)    Patient location: PACU    Anesthesia Type: MAC    Transport from OR: Transported from OR on room air with adequate spontaneous ventilation    Post pain: adequate analgesia    Post assessment: no apparent anesthetic complications and tolerated procedure well    Post vital signs: stable    Level of consciousness: awake    Nausea/Vomiting: no nausea/vomiting    Complications: none          Last vitals:   Visit Vitals    BP (!) 143/76 (BP Location: Left arm, BP Method: Automatic)    Pulse 76    Temp 36.9 °C (98.4 °F) (Oral)    Resp 18    Ht 5' 1" (1.549 m)    Wt 68 kg (150 lb)    SpO2 (!) 94%    Breastfeeding No    BMI 28.34 kg/m2     "

## 2017-05-17 NOTE — ANESTHESIA POSTPROCEDURE EVALUATION
"Anesthesia Post Evaluation    Patient: Leslie Wood    Procedure(s) Performed: Procedure(s) (LRB):  ESOPHAGOGASTRODUODENOSCOPY (EGD) (N/A)    Final Anesthesia Type: MAC  Patient location during evaluation: PACU  Patient participation: Yes- Able to Participate  Level of consciousness: awake  Post-procedure vital signs: reviewed and stable  Pain management: adequate  Airway patency: patent  PONV status at discharge: No PONV  Anesthetic complications: no      Cardiovascular status: blood pressure returned to baseline and hemodynamically stable  Respiratory status: spontaneous ventilation, unassisted and room air  Hydration status: euvolemic  Follow-up not needed.        Visit Vitals    BP (!) 143/76 (BP Location: Left arm, BP Method: Automatic)    Pulse 76    Temp 36.9 °C (98.4 °F) (Oral)    Resp 18    Ht 5' 1" (1.549 m)    Wt 68 kg (150 lb)    SpO2 (!) 94%    Breastfeeding No    BMI 28.34 kg/m2       Pain/Fredy Score: No Data Recorded      "

## 2017-05-17 NOTE — DISCHARGE SUMMARY
[facility]  Brief Operative Note     SUMMARY     Surgery Date: 5/17/2017     Surgeon(s) and Role:     * Adi Wilcox III, MD - Primary    Assisting Surgeon: None    Pre-op Diagnosis:  LUQ pain [R10.12]  Atypical chest pain [R07.89]    Post-op Diagnosis:  Post-Op Diagnosis Codes:     * LUQ pain [R10.12]     * Atypical chest pain [R07.89]     - Gastritis  Procedure(s) (LRB):  ESOPHAGOGASTRODUODENOSCOPY (EGD) (N/A)    Anesthesia: Monitor Anesthesia Care    Description of the findings of the procedure: Procedures completed. See Procedure note for full details.    Findings/Key Components: Procedures completed. See Procedure note for full details.    Prosthetics/Devices: None    Estimated Blood Loss: * No values recorded between 5/17/2017 12:00 AM and 5/17/2017  8:02 AM *         Specimens:   Specimen (12h ago through future)    Start     Ordered    05/17/17 0750  Specimen to Pathology - Surgery  Once     Comments:  1.  Antrum biopsy -R/O H Pylori,Gastritis    05/17/17 0751          Discharge Note    SUMMARY     Admit Date: 5/17/2017    Discharge Date and Time: 5/17/2017    Hospital Course (synopsis of major diagnoses, care, treatment, and services provided during the course of the hospital stay):  Procedures completed. See Procedure note for full details. Discharge patient when discharge criteria met.    Final Diagnosis: Post-Op Diagnosis Codes:     * LUQ pain [R10.12]     * Atypical chest pain [R07.89]      - Gastritis  Disposition: Discharge patient when discharge criteria met.    Follow Up/Patient Instructions:       Medications:  Reconciled Home Medications: Current Discharge Medication List      CONTINUE these medications which have NOT CHANGED    Details   amlodipine (NORVASC) 10 MG tablet Take 10 mg by mouth once daily.       ciprofloxacin HCl (CIPRO) 500 MG tablet Take 1 tablet (500 mg total) by mouth every 12 (twelve) hours.  Qty: 18 tablet, Refills: 0      cyclobenzaprine (FLEXERIL) 10 MG  tablet Take 1 tablet (10 mg total) by mouth 3 (three) times daily as needed for Muscle spasms.  Qty: 15 tablet, Refills: 0      hydrocodone-acetaminophen 7.5-325mg (NORCO) 7.5-325 mg per tablet Take 1 tablet by mouth every 4 (four) hours as needed for Pain.  Qty: 12 tablet, Refills: 0      lisinopril (PRINIVIL,ZESTRIL) 20 MG tablet Take 20 mg by mouth once daily.      metronidazole (FLAGYL) 500 MG tablet Take 1 tablet (500 mg total) by mouth every 8 (eight) hours.  Qty: 28 tablet, Refills: 0      tizanidine (ZANAFLEX) 4 MG tablet Take 1 tablet (4 mg total) by mouth every 6 (six) hours as needed (HOLD while on CIPRO).      ondansetron (ZOFRAN) 4 MG tablet Take 1 tablet (4 mg total) by mouth every 8 (eight) hours as needed for Nausea.  Qty: 12 tablet, Refills: 0      pantoprazole (PROTONIX) 40 MG tablet Take 40 mg by mouth once daily.               Discharge Procedure Orders  Diet general     Activity as tolerated

## 2017-05-17 NOTE — ANESTHESIA PREPROCEDURE EVALUATION
05/17/2017  Leslie Wood is a 76 y.o., female.    Anesthesia Evaluation    I have reviewed the Patient Summary Reports.    I have reviewed the Nursing Notes.   I have reviewed the Medications.     Review of Systems  Anesthesia Hx:  No problems with previous Anesthesia  Denies Family Hx of Anesthesia complications.   Denies Personal Hx of Anesthesia complications.   Social:  Non-Smoker, No Alcohol Use    Hematology/Oncology:  Hematology Normal   Oncology Normal     EENT/Dental:   cataract   Cardiovascular:   Hypertension Denies MI.   Denies CABG/stent.      ECG has been reviewed. ekg 5/2017:  Normal sinus rhythm 65  Normal ECG  When compared with ECG of 09-MAY-2017 20:25,  No significant change was found    Echo 5/2017:  1 - Concentric remodeling.     2 - No wall motion abnormalities.     3 - Normal left ventricular systolic function (EF 60-65%).     4 - Normal left ventricular diastolic function.     5 - Normal right ventricular systolic function .    Pulmonary:   Denies COPD.  Denies Asthma.  Denies Sleep Apnea.    Renal/:  Renal/ Normal     Hepatic/GI:   GERD, poorly controlled Denies Liver Disease. Denies Hepatitis.    Musculoskeletal:   Arthritis     Neurological:   Denies CVA. Denies Seizures.    Endocrine:   Diabetes, type 2 Denies Hypothyroidism. Denies Hyperthyroidism.        Physical Exam  General:  Well nourished    Airway/Jaw/Neck:  Airway Findings: Mouth Opening: Normal Tongue: Normal  General Airway Assessment: Adult  Mallampati: II      Dental:  Dental Findings: In tact   Chest/Lungs:  Chest/Lungs Findings: Clear to auscultation, Normal Respiratory Rate     Heart/Vascular:  Heart Findings: Rate: Normal  Rhythm: Regular Rhythm  Sounds: Normal             Anesthesia Plan  Type of Anesthesia, risks & benefits discussed:  Anesthesia Type:  MAC  Patient's Preference:   Intra-op Monitoring Plan:  standard ASA monitors  Intra-op Monitoring Plan Comments:   Post Op Pain Control Plan:   Post Op Pain Control Plan Comments:   Induction:   IV  Beta Blocker:  Patient is not currently on a Beta-Blocker (No further documentation required).       Informed Consent: Patient understands risks and agrees with Anesthesia plan.  Questions answered. Anesthesia consent signed with patient.  ASA Score: 2     Day of Surgery Review of History & Physical: I have interviewed and examined the patient. I have reviewed the patient's H&P dated:  There are no significant changes.  H&P update referred to the surgeon.         Ready For Surgery From Anesthesia Perspective.

## 2017-05-17 NOTE — IP AVS SNAPSHOT
[address] [place] [address]           Patient Discharge Instructions   Our goal is to set you up for success. This packet includes information on your condition, medications, and your home care.  It will help you care for yourself to prevent having to return to the hospital.     Please ask your nurse if you have any questions.      There are many details to remember when preparing to leave the hospital. Here is what you will need to do:    1. Take your medicine. If you are prescribed medications, review your Medication List on the following pages. You may have new medications to  at the pharmacy and others that you'll need to stop taking. Review the instructions for how and when to take your medications. Talk with your doctor or nurses if you are unsure of what to do.     2. Go to your follow-up appointments. Specific follow-up information is listed in the following pages. Your may be contacted by a nurse or clinical provider about future appointments. Be sure we have all of the phone numbers to reach you. Please contact your provider's office if you are unable to make an appointment.     3. Watch for warning signs. Your doctor or nurse will give you detailed warning signs to watch for and when to call for assistance. These instructions may also include educational information about your condition. If you experience any of warning signs to your health, call your doctor.               ** Verify the list of medication(s) below is accurate and up to date. Carry this with you in case of emergency. If your medications have changed, please notify your healthcare provider.             Medication List      ASK your doctor about these medications        Additional Info                      amlodipine 10 MG tablet   Commonly known as:  NORVASC   Refills:  0   Dose:  10 mg    Instructions:  Take 10 mg by mouth once daily.     Begin Date    AM    Noon    PM    Bedtime        ciprofloxacin HCl 500 MG tablet   Commonly known as:  CIPRO   Quantity:  18 tablet   Refills:  0   Dose:  500 mg    Instructions:  Take 1 tablet (500 mg total) by mouth every 12 (twelve) hours.     Begin Date    AM    Noon    PM    Bedtime       cyclobenzaprine 10 MG tablet   Commonly known as:  FLEXERIL   Quantity:  15 tablet   Refills:  0   Dose:  10 mg    Instructions:  Take 1 tablet (10 mg total) by mouth 3 (three) times daily as needed for Muscle spasms.     Begin Date    AM    Noon    PM    Bedtime       hydrocodone-acetaminophen 7.5-325mg 7.5-325 mg per tablet   Commonly known as:  NORCO   Quantity:  12 tablet   Refills:  0   Dose:  1 tablet    Instructions:  Take 1 tablet by mouth every 4 (four) hours as needed for Pain.     Begin Date    AM    Noon    PM    Bedtime       lisinopril 20 MG tablet   Commonly known as:  PRINIVIL,ZESTRIL   Refills:  0   Dose:  20 mg    Instructions:  Take 20 mg by mouth once daily.     Begin Date    AM    Noon    PM    Bedtime       metronidazole 500 MG tablet   Commonly known as:  FLAGYL   Quantity:  28 tablet   Refills:  0   Dose:  500 mg    Instructions:  Take 1 tablet (500 mg total) by mouth every 8 (eight) hours.     Begin Date    AM    Noon    PM    Bedtime       ondansetron 4 MG tablet   Commonly known as:  ZOFRAN   Quantity:  12 tablet   Refills:  0   Dose:  4 mg    Instructions:  Take 1 tablet (4 mg total) by mouth every 8 (eight) hours as needed for Nausea.     Begin Date    AM    Noon    PM    Bedtime       pantoprazole 40 MG tablet   Commonly known as:  PROTONIX   Refills:  0   Dose:  40 mg    Instructions:  Take 40 mg by mouth once daily.     Begin Date    AM    Noon    PM    Bedtime       tizanidine 4 MG tablet   Commonly known as:  ZANAFLEX   Refills:  0   Dose:  4 mg    Instructions:  Take 1 tablet (4 mg total) by mouth every 6 (six) hours as needed (HOLD while on CIPRO).     Begin Date    AM    Noon    PM    Bedtime                  Please bring to all  follow up appointments:    1. A copy of your discharge instructions.  2. All medicines you are currently taking in their original bottles.  3. Identification and insurance card.    Please arrive 15 minutes ahead of scheduled appointment time.    Please call 24 hours in advance if you must reschedule your appointment and/or time.        Your Scheduled Appointments     Jun 12, 2017  9:00 AM CDT   GASTROENTEROLOGY ESTABLISHED PATIENT with KARINA Gan - Gastroenterology ([facility])    [address] [facility] [address] [phone]                  Discharge Instructions         Gastritis (Adult)    Gastritis is inflammation and irritation of the stomach lining. It can be present for a short time (acute) or be long lasting (chronic). Gastritis is often caused by infection with bacteria called H pylori. More than a third of people in the  have this bacteria in their bodies. In many cases, H pylori causes no problems or symptoms. In some people, though, the infection irritates the stomach lining and causes gastritis. Other causes of stomach irritation include drinking alcohol or taking pain-relieving medicines called NSAIDs (such as aspirin or ibuprofen).   Symptoms of gastritis can include:  · Abdominal pain or bloating  · Loss of appetite  · Nausea or vomiting  · Vomiting blood or having black stools  · Feeling more tired than usual  An inflamed and irritated stomach lining is more likely to develop a sore called an ulcer. To help prevent this, gastritis should be treated.  Home care  If needed, medicines may be prescribed. If you have H pylori infection, treating it will likely relieve your symptoms. Other changes can help reduce stomach irritation and help it heal.  · If you have been prescribed medicines for H pylori infection, take them as directed. Take all of the medicine until it is finished or your healthcare provider tells you to stop, even if you feel  "better.  · Your healthcare provider may recommend avoiding NSAIDs. If you take daily aspirin for your heart or other medical reasons, do not stop without talking to your healthcare provider first.  · Avoid drinking alcohol.  · Stop smoking. Smoking can irritate the stomach and delay healing. As much as possible, stay away from second hand smoke.  Follow-up care  Follow up with your healthcare provider, or as advised by our staff. Testing may be needed to check for inflammation or an ulcer.  When to seek medical advice  Call your healthcare provider for any of the following:  · Stomach pain that gets worse or moves to the lower right abdomen (appendix area)  · Chest pain that appears or gets worse, or spreads to the back, neck, shoulder, or arm  · Frequent vomiting (cant keep down liquids)  · Blood in the stool or vomit (red or black in color)  · Feeling weak or dizzy  · Fever of 100.4ºF (38ºC) or higher, or as directed by your healthcare provider  Date Last Reviewed: 6/22/2015  © 9068-1247 [employer]. [address] [place] [address]. All rights reserved. This information is not intended as a substitute for professional medical care. Always follow your healthcare professional's instructions.            Admission Information     Date & Time Provider Department CSN    5/17/2017  6:48 AM Adi Wilcox III, MD [facility] -  [id number]      Care Providers     Provider Role Specialty Primary office phone    Adi Wilcox III, MD Attending Provider Gastroenterology [phone]    Adi Wilcox III, MD Surgeon  Gastroenterology [phone]      Your Vitals Were     BP Pulse Temp Resp Height Weight    136/78 82 98.4 °F (36.9 °C) (Oral) 22 5' 1" (1.549 m) 68 kg (150 lb)    SpO2 BMI             96% 28.34 kg/m2         Recent Lab Values        5/11/2017                           4:45 PM           A1C 5.6           Comment for A1C at  4:45 PM on 5/11/2017:  According to ADA " guidelines, hemoglobin A1C <7.0% represents  optimal control in non-pregnant diabetic patients.  Different  metrics may apply to specific populations.   Standards of Medical Care in Diabetes - 2016.  For the purpose of screening for the presence of diabetes:  <5.7%     Consistent with the absence of diabetes  5.7-6.4%  Consistent with increasing risk for diabetes   (prediabetes)  >or=6.5%  Consistent with diabetes  Currently no consensus exists for use of hemoglobin A1C  for diagnosis of diabetes for children.        Pending Labs     Order Current Status    Specimen to Pathology - Surgery Collected (05/17/17 7821)      Allergies as of 5/17/2017        Reactions    Amitriptyline     Percocet [Oxycodone-acetaminophen] Other (See Comments)    Seizures    Codeine Nausea Only, Rash      [facility]     [facility] Nurse Care Line - 24/7 Assistance  Unless otherwise directed by your provider, please contact [facility], our nurse care line that is available for 24/7 assistance.     Registered nurses in the [facility] provide clinical advisement, health education, appointment booking, and other advisory services.  Call for this free service at [phone].        Advance Directives     An advance directive is a document which, in the event you are no longer able to make decisions for yourself, tells your healthcare team what kind of treatment you do or do not want to receive, or who you would like to make those decisions for you.  If you do not currently have an advance directive, [facility] encourages you to create one.  For more information call:  [phone]), [phone] ([phone]),  or log on to [email or web address].        Language Assistance Services     ATTENTION: Language assistance services are available, free of charge. Please call [phone].      ATENCIÓN: Si habla español, tiene a montague disposición servicios gratuitos de asistencia lingüística. Llame al  [phone].     [facility] Ý: N?u b?n nói Ti?ng Vi?t, có các d?ch v? h? tr? ngôn ng? mi?n phí dành cho b?n. G?i s? [phone].        Diabetes Discharge Instructions                                   [facility] Sign-Up     Activating your [facility] account is as easy as 1-2-3!     1) Visit [email or web address], select Sign Up Now, enter this activation code and your date of birth, then select Next.  [email or web address]  Expires: 6/23/2017 10:28 PM      2) Create a username and password to use when you visit [facility] in the future and select a security question in case you lose your password and select Next.    3) Enter your e-mail address and click Sign Up!    Additional Information  If you have questions, please e-mail [email or web address] or call [phone] to talk to our [facility] staff. Remember, [facility] is NOT to be used for urgent needs. For medical emergencies, dial 911.          [facility] complies with applicable Federal civil rights laws and does not discriminate on the basis of race, color, national origin, age, disability, or sex.

## 2017-05-17 NOTE — DISCHARGE INSTRUCTIONS
Gastritis (Adult)    Gastritis is inflammation and irritation of the stomach lining. It can be present for a short time (acute) or be long lasting (chronic). Gastritis is often caused by infection with bacteria called H pylori. More than a third of people in the [place] have this bacteria in their bodies. In many cases, H pylori causes no problems or symptoms. In some people, though, the infection irritates the stomach lining and causes gastritis. Other causes of stomach irritation include drinking alcohol or taking pain-relieving medicines called NSAIDs (such as aspirin or ibuprofen).   Symptoms of gastritis can include:  · Abdominal pain or bloating  · Loss of appetite  · Nausea or vomiting  · Vomiting blood or having black stools  · Feeling more tired than usual  An inflamed and irritated stomach lining is more likely to develop a sore called an ulcer. To help prevent this, gastritis should be treated.  Home care  If needed, medicines may be prescribed. If you have H pylori infection, treating it will likely relieve your symptoms. Other changes can help reduce stomach irritation and help it heal.  · If you have been prescribed medicines for H pylori infection, take them as directed. Take all of the medicine until it is finished or your healthcare provider tells you to stop, even if you feel better.  · Your healthcare provider may recommend avoiding NSAIDs. If you take daily aspirin for your heart or other medical reasons, do not stop without talking to your healthcare provider first.  · Avoid drinking alcohol.  · Stop smoking. Smoking can irritate the stomach and delay healing. As much as possible, stay away from second hand smoke.  Follow-up care  Follow up with your healthcare provider, or as advised by our staff. Testing may be needed to check for inflammation or an ulcer.  When to seek medical advice  Call your healthcare provider for any of the following:  · Stomach pain that gets worse or moves to the lower  right abdomen (appendix area)  · Chest pain that appears or gets worse, or spreads to the back, neck, shoulder, or arm  · Frequent vomiting (cant keep down liquids)  · Blood in the stool or vomit (red or black in color)  · Feeling weak or dizzy  · Fever of 100.4ºF (38ºC) or higher, or as directed by your healthcare provider  Date Last Reviewed: 6/22/2015  © 6528-3016 [employer]. [address] [place] [address]. All rights reserved. This information is not intended as a substitute for professional medical care. Always follow your healthcare professional's instructions.

## 2017-05-18 VITALS
BODY MASS INDEX: 28.32 KG/M2 | RESPIRATION RATE: 18 BRPM | WEIGHT: 150 LBS | HEART RATE: 82 BPM | HEIGHT: 61 IN | SYSTOLIC BLOOD PRESSURE: 120 MMHG | TEMPERATURE: 98 F | OXYGEN SATURATION: 98 % | DIASTOLIC BLOOD PRESSURE: 68 MMHG

## 2017-05-23 ENCOUNTER — TELEPHONE (OUTPATIENT)
Dept: GASTROENTEROLOGY | Facility: CLINIC | Age: 77
End: 2017-05-23

## 2017-05-23 ENCOUNTER — TELEPHONE (OUTPATIENT)
Dept: FAMILY MEDICINE | Facility: CLINIC | Age: 77
End: 2017-05-23

## 2017-05-23 NOTE — TELEPHONE ENCOUNTER
----- Message from Re Allison sent at 5/23/2017 10:58 AM CDT -----  Please call pt back at [phone] about her not being able to eat solid food. She does not know what is going on since she was release from hospital/

## 2017-05-23 NOTE — TELEPHONE ENCOUNTER
----- Message from Elva Negron sent at 5/23/2017  8:14 AM CDT -----  Contact: pt  States she is calling to get her test results from last week and can be reached at [phone]//[email or web address]

## 2017-05-23 NOTE — TELEPHONE ENCOUNTER
----- Message from Sophie Collazo sent at 5/23/2017  2:39 PM CDT -----  Contact: patient  States she is suffering and can't eat and would like to know what is going on. Also states she would like the results from the upper GI done on 051/7/17 by Dr Wilcox. Please call patient ASAP today URGENT!! @ [phone]. Thanks, denise      [facility] - [place], [place] - [address] [facility] [address] [id number] [address] [address]  Phone: [phone] Fax: [phone]

## 2017-05-24 NOTE — TELEPHONE ENCOUNTER
Patient reported no complaints of dysphagia or being able to hold any solids down. She mentioned that she had a EGD with dilation in the past. Since her EGD was unrevealing for dysphagia, please have patient schedule an appt with me so I can get a good history from her for dysphagia.

## 2017-05-24 NOTE — TELEPHONE ENCOUNTER
----- Message from Cam Garner MA sent at 5/24/2017 11:32 AM CDT -----  Contact: patient  Called and spoke with patient and EGD results were given. Patient also stated that she is still having problems swallowing and has been drinking liquids for 2 weeks now. Patient would like to know what she should do?  ----- Message -----  From: Sophie Collazo  Sent: 5/23/2017   2:39 PM  To: [facility] Staff, #    States she is suffering and can't eat and would like to know what is going on. Also states she would like the results from the upper GI done on 051/7/17 by Dr Wilcox. Please call patient ASAP today URGENT!! @ [phone]. Thanks, denise GUTIERREZ [facility] - [place] - [address] [facility] [address] [phone] [address] [address]  Phone: [phone] Fax: [phone]

## 2017-05-25 ENCOUNTER — OFFICE VISIT (OUTPATIENT)
Dept: GASTROENTEROLOGY | Facility: CLINIC | Age: 77
End: 2017-05-25
Payer: MEDICARE

## 2017-05-25 DIAGNOSIS — K59.00 CONSTIPATION, UNSPECIFIED CONSTIPATION TYPE: Primary | ICD-10-CM

## 2017-05-25 DIAGNOSIS — R68.81 EARLY SATIETY: ICD-10-CM

## 2017-05-25 DIAGNOSIS — R14.0 ABDOMINAL BLOATING: ICD-10-CM

## 2017-05-25 PROCEDURE — 99213 OFFICE O/P EST LOW 20 MIN: CPT | Mod: S$PBB,,, | Performed by: NURSE PRACTITIONER

## 2017-05-25 PROCEDURE — 99999 PR PBB SHADOW E&M-EST. PATIENT-LVL II: CPT | Mod: PBBFAC,,, | Performed by: NURSE PRACTITIONER

## 2017-05-25 PROCEDURE — 99212 OFFICE O/P EST SF 10 MIN: CPT | Mod: PBBFAC,PO | Performed by: NURSE PRACTITIONER

## 2017-05-25 RX ORDER — AMLODIPINE BESYLATE 10 MG/1
TABLET ORAL
COMMUNITY
Start: 2017-05-15 | End: 2019-06-24

## 2017-05-25 RX ORDER — MELOXICAM 7.5 MG/1
TABLET ORAL
COMMUNITY
Start: 2017-03-21 | End: 2017-05-25

## 2017-05-25 RX ORDER — CEFDINIR 300 MG/1
300 CAPSULE ORAL EVERY 12 HOURS
COMMUNITY
Start: 2017-03-06 | End: 2017-05-25 | Stop reason: ALTCHOICE

## 2017-05-25 NOTE — PROGRESS NOTES
Clinic Follow Up:  [facility] Follow Up Note    Reason for Follow Up:  The primary encounter diagnosis was Constipation, unspecified constipation type. Diagnoses of Abdominal bloating and Early satiety were also pertinent to this visit.    PCP: Adi Ga Say       HPI:  This is a 76 y.o. female here for follow up of the above. I have recently seen Mrs. Wood for atypical chest pain. She underwent EGD to R/O GI cause of chest pain. EGD was normal. Patient presents to clinic today with different complaints. Onset of symptoms started around the same time her chest pain started (about 3 weeks ago). She reports the feeling like her food is not digesting. She deies any dysphagia. She feels that the food is not leaving her stomach until several hours later. She has associated early satiety and has been eating mostly semi-solid and liquids like soups. Her symptoms are exacerbated by solid foods. She does take pain medication on a regular basis but uses it sparingly. Over the last few weeks she has been taking more of them to treat her pain. She reports that since then she has not had very much solid stool. She has mostly liquid stool in small amounts. She also describes incontenence with the liquid stool. She had an abdomen xray done on 5/11/17. I personally reviewed xray images with the patient. Xray showed a significant amount of stool retained in the colon that is consistent with constipation. She denies any hematochezia, melena, nausea, vomiting, or weight loss. She has not been taking the Miralax however she says it does move her bowels.     Review of Systems   Constitutional: Negative for activity change and appetite change.        As per interval history above   Respiratory: Negative for cough and shortness of breath.    Cardiovascular: Negative for chest pain.   Gastrointestinal: Negative for abdominal distention, abdominal pain, anal bleeding, blood in stool, nausea, rectal pain and  vomiting.        Early Satiety, abdominal bloating.   Musculoskeletal: Positive for back pain.   Skin: Negative for color change and rash.       Medical History:  Past Medical History:   Diagnosis Date    Arthritis     Cataract     Diabetes mellitus     controled with diet    GERD (gastroesophageal reflux disease)     Hypertension        Surgical History:   Past Surgical History:   Procedure Laterality Date    ADENOIDECTOMY      ADRENAL GLAND SURGERY      APPENDECTOMY      BACK SURGERY      fusion l 4-5 s 1,2,3  fusion l 2-3    EYE SURGERY      HEMORRHOID SURGERY      HERNIA REPAIR      HYSTERECTOMY      TONSILLECTOMY         Family History:   Family History   Problem Relation Age of Onset    Heart disease Mother     Hypertension Mother     Diabetes Mother     Hypertension Father     Kidney disease Father        Social History:   Social History   Substance Use Topics    Smoking status: Never Smoker    Smokeless tobacco: Never Used    Alcohol use No       Allergies:   Review of patient's allergies indicates:   Allergen Reactions    Amitriptyline     Percocet [oxycodone-acetaminophen] Other (See Comments)     Seizures    Codeine Nausea Only and Rash       Home Medications:  Current Outpatient Prescriptions on File Prior to Visit   Medication Sig Dispense Refill    hydrocodone-acetaminophen 7.5-325mg (NORCO) 7.5-325 mg per tablet Take 1 tablet by mouth every 4 (four) hours as needed for Pain. 12 tablet 0    lisinopril (PRINIVIL,ZESTRIL) 20 MG tablet Take 20 mg by mouth once daily.      pantoprazole (PROTONIX) 40 MG tablet Take 40 mg by mouth once daily.       tizanidine (ZANAFLEX) 4 MG tablet Take 1 tablet (4 mg total) by mouth every 6 (six) hours as needed (HOLD while on CIPRO).      [DISCONTINUED] amlodipine (NORVASC) 10 MG tablet Take 10 mg by mouth once daily.       [DISCONTINUED] metronidazole (FLAGYL) 500 MG tablet Take 1 tablet (500 mg total) by mouth every 8 (eight) hours. 28  tablet 0    [DISCONTINUED] ondansetron (ZOFRAN) 4 MG tablet Take 1 tablet (4 mg total) by mouth every 8 (eight) hours as needed for Nausea. 12 tablet 0     No current facility-administered medications on file prior to visit.        Physical Exam:  Vital Signs:  There were no vitals taken for this visit.  There is no height or weight on file to calculate BMI.  Physical Exam   Constitutional: She is oriented to person, place, and time and well-developed, well-nourished, and in no distress. No distress.   HENT:   Head: Normocephalic.   Eyes: Conjunctivae are normal. Pupils are equal, round, and reactive to light.   Cardiovascular: Normal rate, regular rhythm and normal heart sounds.    Pulmonary/Chest: Effort normal and breath sounds normal. No respiratory distress.   Abdominal: Soft. Bowel sounds are normal. She exhibits no distension. There is no tenderness.   Neurological: She is alert and oriented to person, place, and time. No cranial nerve deficit.   Skin: Skin is warm and dry. No rash noted.   Psychiatric: Mood and affect normal.   Vitals reviewed.      Labs: Pertinent labs reviewed.    Assessment:  1. Constipation, unspecified constipation type    2. Abdominal bloating    3. Early satiety        Recommendations:  I believe that she has become constipated since the increase in opoid pain medication use. It is likely that her symptoms are related to constipation.  Reviewed abdomen xray with patient.  Will treat constipation with Miralax once to twice a day.   She will return to clinic in 2 weeks  If constipation has improved but is still feeling like her food is not digesting well, will schedule patient for GES.       Return to Clinic:  Return in about 2 weeks (around 6/8/2017).    Thank you for the opportunity to participate in the care of this patient.  MILAGRO Valle      Addendum: 5/31/17: Past medical records reviewed from [facility].  Last Colonoscopy done on 12/3/15: Non-thrombosed  external/internal hemorrhoids; diverticulosis in the sigmoid colon and descending colon; 5mm polyp at the distal sigmoid colon; recommend repeat in 5 yrs.   Last EGD done on 12/3/15: small hiatal hernia; low grade narrowing schatzki ring that was dilated.

## 2017-06-06 ENCOUNTER — TELEPHONE (OUTPATIENT)
Dept: GASTROENTEROLOGY | Facility: CLINIC | Age: 77
End: 2017-06-06

## 2017-06-06 NOTE — TELEPHONE ENCOUNTER
Spoke with patient. States that Miralax is not working. Stools are really watery with no consistency. Need some advice.

## 2017-06-06 NOTE — TELEPHONE ENCOUNTER
----- Message from Ofe Bonilla sent at 6/6/2017 11:43 AM CDT -----  Contact: pt  She's calling concerning medication not working for colon blockage, please advise [phone] (home)

## 2017-06-07 NOTE — TELEPHONE ENCOUNTER
Pt informed that concerns will be further discussed at her appointment on 6/8/17. Pt voiced understanding.

## 2017-06-08 ENCOUNTER — OFFICE VISIT (OUTPATIENT)
Dept: GASTROENTEROLOGY | Facility: CLINIC | Age: 77
End: 2017-06-08
Payer: MEDICARE

## 2017-06-08 VITALS
HEART RATE: 72 BPM | BODY MASS INDEX: 28.05 KG/M2 | HEIGHT: 61 IN | WEIGHT: 148.56 LBS | DIASTOLIC BLOOD PRESSURE: 70 MMHG | SYSTOLIC BLOOD PRESSURE: 152 MMHG

## 2017-06-08 DIAGNOSIS — R19.7 DIARRHEA, UNSPECIFIED TYPE: ICD-10-CM

## 2017-06-08 DIAGNOSIS — R11.0 NAUSEA: Primary | ICD-10-CM

## 2017-06-08 PROCEDURE — 99213 OFFICE O/P EST LOW 20 MIN: CPT | Mod: S$PBB,,, | Performed by: NURSE PRACTITIONER

## 2017-06-08 PROCEDURE — 99999 PR PBB SHADOW E&M-EST. PATIENT-LVL III: CPT | Mod: PBBFAC,,, | Performed by: NURSE PRACTITIONER

## 2017-06-08 PROCEDURE — 1126F AMNT PAIN NOTED NONE PRSNT: CPT | Mod: ,,, | Performed by: NURSE PRACTITIONER

## 2017-06-08 PROCEDURE — 99213 OFFICE O/P EST LOW 20 MIN: CPT | Mod: PBBFAC,PO | Performed by: NURSE PRACTITIONER

## 2017-06-08 PROCEDURE — 1159F MED LIST DOCD IN RCRD: CPT | Mod: ,,, | Performed by: NURSE PRACTITIONER

## 2017-06-08 RX ORDER — HYDROCODONE BITARTRATE AND ACETAMINOPHEN 10; 325 MG/1; MG/1
TABLET ORAL
Status: ON HOLD | COMMUNITY
Start: 2017-05-27 | End: 2020-08-04 | Stop reason: SDUPTHER

## 2017-06-08 NOTE — PROGRESS NOTES
Clinic Follow Up:  [facility] Follow Up Note    Reason for Follow Up:  The primary encounter diagnosis was Nausea. A diagnosis of Diarrhea, unspecified type was also pertinent to this visit.    PCP: Adi Ga Say       HPI:  This is a 76 y.o. female here for follow up of the above. She reports having increase in diarrhea while taking the Miralax. She also reports no improvement in her nausea. Nausea is worse with eating especially with solid foods. She still feels like her food is sitting in her stomach. She denies any vomiting. She denies any abdominal pain, hematochezia, and melena.     Review of Systems   Constitutional: Negative for activity change and appetite change.        As per interval history above   Respiratory: Negative for cough and shortness of breath.    Cardiovascular: Negative for chest pain.   Gastrointestinal: Positive for diarrhea and nausea. Negative for abdominal distention, abdominal pain, anal bleeding, blood in stool, constipation, rectal pain and vomiting.   Skin: Negative for color change and rash.       Medical History:  Past Medical History:   Diagnosis Date    Arthritis     Cataract     Diabetes mellitus     controled with diet    GERD (gastroesophageal reflux disease)     Hypertension        Surgical History:   Past Surgical History:   Procedure Laterality Date    ADENOIDECTOMY      ADRENAL GLAND SURGERY      APPENDECTOMY      BACK SURGERY      fusion l 4-5 s 1,2,3  fusion l 2-3    EYE SURGERY      HEMORRHOID SURGERY      HERNIA REPAIR      HYSTERECTOMY      TONSILLECTOMY         Family History:   Family History   Problem Relation Age of Onset    Heart disease Mother     Hypertension Mother     Diabetes Mother     Hypertension Father     Kidney disease Father        Social History:   Social History   Substance Use Topics    Smoking status: Never Smoker    Smokeless tobacco: Never Used    Alcohol use No       Allergies:   Review of  "patient's allergies indicates:   Allergen Reactions    Amitriptyline     Percocet [oxycodone-acetaminophen] Other (See Comments)     Seizures    Codeine Nausea Only and Rash       Home Medications:  Current Outpatient Prescriptions on File Prior to Visit   Medication Sig Dispense Refill    amlodipine (NORVASC) 10 MG tablet TAKE 1 TABLET BY MOUTH DAILY      lisinopril (PRINIVIL,ZESTRIL) 20 MG tablet Take 20 mg by mouth once daily.      pantoprazole (PROTONIX) 40 MG tablet Take 40 mg by mouth once daily.       tizanidine (ZANAFLEX) 4 MG tablet Take 1 tablet (4 mg total) by mouth every 6 (six) hours as needed (HOLD while on CIPRO).      [DISCONTINUED] hydrocodone-acetaminophen 7.5-325mg (NORCO) 7.5-325 mg per tablet Take 1 tablet by mouth every 4 (four) hours as needed for Pain. 12 tablet 0     No current facility-administered medications on file prior to visit.        Physical Exam:  Vital Signs:  BP (!) 152/70   Pulse 72   Ht 5' 1" (1.549 m)   Wt 67.4 kg (148 lb 9.4 oz)   BMI 28.08 kg/m²   Body mass index is 28.08 kg/m².  Physical Exam   Constitutional: She is oriented to person, place, and time and well-developed, well-nourished, and in no distress. No distress.   HENT:   Head: Normocephalic.   Eyes: Conjunctivae are normal. Pupils are equal, round, and reactive to light.   Cardiovascular: Normal rate, regular rhythm and normal heart sounds.    Pulmonary/Chest: Effort normal and breath sounds normal. No respiratory distress.   Abdominal: Soft. Bowel sounds are normal. She exhibits no distension. There is no tenderness.   Neurological: She is alert and oriented to person, place, and time. No cranial nerve deficit.   Skin: Skin is warm and dry. No rash noted.   Psychiatric: Mood and affect normal.   Vitals reviewed.      Labs: Pertinent labs reviewed.    Assessment:  1. Nausea    2. Diarrhea, unspecified type        Recommendations:  Nausea  - Patient with persistent nausea worsening with food intake and " feeling of food sitting in stomach  - Will schedule a GES for further evaluation  -     NM Gastric Emptying; Future; Expected date: 06/08/2017    Diarrhea, unspecified type  - Stop Miralax  - Continue with dietary fiber intake.       Return to Clinic:  Follow up to be determined after procedure.    Thank you for the opportunity to participate in the care of this patient.  MILAGRO Valle

## 2018-03-28 ENCOUNTER — HOSPITAL ENCOUNTER (EMERGENCY)
Facility: HOSPITAL | Age: 78
Discharge: HOME | End: 2018-03-28
Attending: EMERGENCY MEDICINE
Payer: MEDICARE

## 2018-03-28 VITALS
HEIGHT: 61 IN | SYSTOLIC BLOOD PRESSURE: 183 MMHG | BODY MASS INDEX: 28.51 KG/M2 | TEMPERATURE: 98 F | OXYGEN SATURATION: 96 % | RESPIRATION RATE: 20 BRPM | WEIGHT: 151 LBS | DIASTOLIC BLOOD PRESSURE: 73 MMHG | HEART RATE: 74 BPM

## 2018-03-28 DIAGNOSIS — S09.90XA RECENT HEAD TRAUMA, INITIAL ENCOUNTER: Primary | ICD-10-CM

## 2018-03-28 DIAGNOSIS — G44.319 ACUTE POST-TRAUMATIC HEADACHE, NOT INTRACTABLE: ICD-10-CM

## 2018-03-28 DIAGNOSIS — S00.03XA HEMATOMA OF RIGHT PARIETAL SCALP, INITIAL ENCOUNTER: ICD-10-CM
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
## 2018-03-28 PROCEDURE — 25000003 PHARM REV CODE 250: Performed by: NURSE PRACTITIONER

## 2018-03-28 PROCEDURE — 99284 EMERGENCY DEPT VISIT MOD MDM: CPT

## 2018-03-28 RX ORDER — HYDROCODONE BITARTRATE AND ACETAMINOPHEN 5; 325 MG/1; MG/1
1 TABLET ORAL
Status: COMPLETED | OUTPATIENT
Start: 2018-03-28 | End: 2018-03-28

## 2018-03-28 RX ADMIN — HYDROCODONE BITARTRATE AND ACETAMINOPHEN 1 TABLET: 5; 325 TABLET ORAL at 10:03

## 2018-03-29 ENCOUNTER — PES CALL (OUTPATIENT)
Dept: ADMINISTRATIVE | Facility: CLINIC | Age: 78
End: 2018-03-29

## 2018-03-29 NOTE — ED NOTES
Pt left ed via wheelchair with family. Pt speaking full clear sentences without difficulty. Pt verbalized understanding and importance of follow up instructions.

## 2018-03-29 NOTE — ED NOTES
Pt states she was playing with her granddaugter and fell on the ceramic tile and hit her head. Pt denies any dizziness or gait issues at this time.

## 2018-03-29 NOTE — ED PROVIDER NOTES
SCRIBE #1 NOTE: I, Daniel Gimenez, am scribing for, and in the presence of, Chano Wolf NP. I have scribed the entire note.      History      Chief Complaint   Patient presents with    Head Injury     states tripped and fell hitting back of head at 8:30pm.  No LOC. Denies n/v, c/o headache. Pt AAO x 4. Ambulatory at present with daughter       Review of patient's allergies indicates:   Allergen Reactions    Amitriptyline     Percocet [oxycodone-acetaminophen] Other (See Comments)     Seizures    Codeine Nausea Only and Rash        HPI   HPI    3/28/2018, 10:20 PM   History obtained from the patient      History of Present Illness: Leslie Wood is a 77 y.o. female patient who presents to the Emergency Department for head injury which onset suddenly 1 hour PTA after tripping and falling while walking down the laws. Symptoms are constant and moderate in severity. Pt states she hit the back of her head. No mitigating or exacerbating factors reported. Associated sxs include HA. Patient denies any LOC, n/v/d, lightheadedness, dizziness, back pain, neck pain, and all other sxs at this time. No further complaints or concerns at this time.         Arrival mode: Personal vehicle     PCP: Adi Almeida MD       Past Medical History:  Past Medical History:   Diagnosis Date    Arthritis     Cataract     Diabetes mellitus     controled with diet    GERD (gastroesophageal reflux disease)     Hypertension        Past Surgical History:  Past Surgical History:   Procedure Laterality Date    ADENOIDECTOMY      ADRENAL GLAND SURGERY      APPENDECTOMY      BACK SURGERY      fusion l 4-5 s 1,2,3  fusion l 2-3    EYE SURGERY      HEMORRHOID SURGERY      HERNIA REPAIR      HYSTERECTOMY      TONSILLECTOMY           Family History:  Family History   Problem Relation Age of Onset    Heart disease Mother     Hypertension Mother     Diabetes Mother     Hypertension Father     Kidney disease Father        Social  History:  Social History     Social History Main Topics    Smoking status: Never Smoker    Smokeless tobacco: Never Used    Alcohol use No    Drug use: No    Sexual activity: Not Currently       ROS   Review of Systems   Constitutional: Negative for chills and fever.   HENT: Negative for sore throat.         + head injury   Respiratory: Negative for shortness of breath.    Cardiovascular: Negative for chest pain.   Gastrointestinal: Negative for abdominal pain, diarrhea, nausea and vomiting.   Genitourinary: Negative for dysuria.   Musculoskeletal: Negative for back pain and neck pain.   Skin: Negative for rash.   Neurological: Positive for headaches. Negative for dizziness, weakness, light-headedness and numbness.   Hematological: Does not bruise/bleed easily.       Physical Exam      Initial Vitals [03/28/18 2144]   BP Pulse Resp Temp SpO2   (!) 183/73 74 20 98.1 °F (36.7 °C) 96 %      MAP       109.67          Physical Exam  Nursing Notes and Vital Signs Reviewed.  Constitutional: Patient is in no acute distress. Awake and alert. Well-developed and well-nourished.  Head: Normocephalic. Hematoma to right scalp.  Eyes: PERRL. EOM intact. Conjunctivae are not pale. No scleral icterus.  ENT: Mucous membranes are moist. Oropharynx is clear and symmetric.    Neck: Supple. Full ROM. No lymphadenopathy.  Cardiovascular: Regular rate. Regular rhythm. No murmurs, rubs, or gallops. Distal pulses are 2+ and symmetric.  Pulmonary/Chest: No respiratory distress. Clear to auscultation bilaterally. No wheezing, rales, or rhonchi.  Abdominal: Soft and non-distended.  There is no tenderness.  No rebound, guarding, or rigidity.    Musculoskeletal: Moves all extremities. No obvious deformities. No edema.   Skin: Warm and dry.  Neurological:  Alert, awake, and appropriate.  Normal speech.  No acute focal neurological deficits are appreciated.  Psychiatric: Normal affect. Good eye contact. Appropriate in content.    ED Course   "  Procedures  ED Vital Signs:  Vitals:    03/28/18 2144   BP: (!) 183/73   Pulse: 74   Resp: 20   Temp: 98.1 °F (36.7 °C)   SpO2: 96%   Weight: 68.5 kg (151 lb 0.2 oz)   Height: 5' 1" (1.549 m)       Abnormal Lab Results:  Labs Reviewed - No data to display     All Lab Results:      Imaging Results:  Imaging Results          CT Head Without Contrast (Final result)  Result time 03/28/18 22:33:22    Final result by Kay Hernandez MD (03/28/18 22:33:22)                 Impression:     Mild to moderate generalized age-related atrophy.  A prominent right superficial scalp hematoma is present in the parietal region.        All CT scans at this facility use dose modulation, iterative reconstruction and/or weight based dosing when appropriate to reduce radiation dose to as low as reasonably achievable.       Electronically signed by: KAY HERNANDEZ MD  Date:     03/28/18  Time:    22:33              Narrative:    CT HEAD WITHOUT CONTRAST     History:  Headache, post trauma     Technique:  Noncontrast CT of the brain.        Findings:  The ventricles are dilated consistent with generalized atrophy. Associated age-related white matter degeneration is present.     There is soft tissue hematoma superficially in the scalp in the right parietal region. the underlying calvarium appears intact.  The mastoids and paranasal sinuses are clear.                                      The Emergency Provider reviewed the vital signs and test results, which are outlined above.    ED Discussion     Reevaluation: The patient feels better and is resting comfortably. Pt states symptoms are improved. Discussed test results, shared treatment plan, specific conditions for return, and the importance of follow up. Pt feels comfortable with the plan as discussed. Answered questions at this time. Patient has remained hemodynamically stable throughout ED course and is stable for discharge.     Trauma precautions were discussed with patient and/or " family/caretaker; I do not specifically detect any abdominal, thoracic, CNS, orthopedic, or other emergent or life threatening condition and that patient is safe to be discharged.  It was also discussed that despite an unrevealing examination and negative radiographic examination for serious or life threatening injury, these conditions may still exist.  As such, patient should return to ED immediately should they experience, severe or worsening pain, shortness of breath, abdominal pain, headache, vomiting, or any other concern.  It was also discussed that not infrequently, injuries may not be diagnosed during the initial ED visit (such as fractures) and that if the patient discovers a new area of concern, a new area of injury that was not evaluated in the ED, they should return for evaluation as they may have an injury that requires treatment.    ED Medication(s):  Medications   hydrocodone-acetaminophen 5-325mg per tablet 1 tablet (1 tablet Oral Given 3/28/18 2246)       Discharge Medication List as of 3/28/2018 11:12 PM          Follow-up Information     Adi Almeida MD. Schedule an appointment as soon as possible for a visit in 1 day.    Specialty:  Internal Medicine  Contact information:  [address] [facility] [address] [phone]             [facility] - .    Specialty:  Emergency Medicine  Why:  As needed, If symptoms worsen  Contact information:  [address] [facility] [address] [phone]                   Medical Decision Making              Scribe Attestation:   Scribe #1: I performed the above scribed service and the documentation accurately describes the services I performed. I attest to the accuracy of the note.    Attending:   Physician Attestation Statement for Scribe #1: I, Chano Wolf NP, personally performed the services described in this documentation, as scribed by Daniel Gimenez, in my presence, and it is both accurate and complete.           Clinical Impression       ICD-10-CM ICD-9-CM   1. Recent head trauma, initial encounter S09.90XA 959.01   2. Hematoma of right parietal scalp, initial encounter S00.03XA 920   3. Fall, initial encounter W19.XXXA E888.9   4. Acute post-traumatic headache, not intractable G44.319 339.21       Disposition:   Disposition: Discharged  Condition: Stable           Chano Wolf NP  03/29/18 0108

## 2018-09-27 ENCOUNTER — HOSPITAL ENCOUNTER (EMERGENCY)
Facility: HOSPITAL | Age: 78
Discharge: HOME | End: 2018-09-27
Attending: EMERGENCY MEDICINE
Payer: MEDICARE

## 2018-09-27 VITALS
HEART RATE: 75 BPM | RESPIRATION RATE: 20 BRPM | WEIGHT: 150 LBS | DIASTOLIC BLOOD PRESSURE: 54 MMHG | OXYGEN SATURATION: 96 % | SYSTOLIC BLOOD PRESSURE: 108 MMHG | BODY MASS INDEX: 28.32 KG/M2 | HEIGHT: 61 IN | TEMPERATURE: 99 F

## 2018-09-27 DIAGNOSIS — E86.1 INTRAVASCULAR VOLUME DEPLETION: Primary | ICD-10-CM

## 2018-09-27 DIAGNOSIS — R53.1 WEAKNESS: ICD-10-CM

## 2018-09-27 DIAGNOSIS — N17.9 ACUTE KIDNEY INJURY: ICD-10-CM

## 2018-09-27 LAB
ALBUMIN SERPL BCP-MCNC: 3.7 G/DL
ALP SERPL-CCNC: 62 U/L
ALT SERPL W/O P-5'-P-CCNC: 26 U/L
ANION GAP SERPL CALC-SCNC: 14 MMOL/L
AST SERPL-CCNC: 48 U/L
BASOPHILS # BLD AUTO: 0.01 K/UL
BASOPHILS NFR BLD: 0.2 %
BILIRUB SERPL-MCNC: 0.5 MG/DL
BUN SERPL-MCNC: 27 MG/DL
CALCIUM SERPL-MCNC: 8.7 MG/DL
CHLORIDE SERPL-SCNC: 104 MMOL/L
CK SERPL-CCNC: 1560 U/L
CO2 SERPL-SCNC: 20 MMOL/L
CREAT SERPL-MCNC: 1.6 MG/DL
DIFFERENTIAL METHOD: ABNORMAL
EOSINOPHIL # BLD AUTO: 0 K/UL
EOSINOPHIL NFR BLD: 0 %
ERYTHROCYTE [DISTWIDTH] IN BLOOD BY AUTOMATED COUNT: 14.2 %
EST. GFR  (AFRICAN AMERICAN): 35 ML/MIN/1.73 M^2
EST. GFR  (NON AFRICAN AMERICAN): 30 ML/MIN/1.73 M^2
GLUCOSE SERPL-MCNC: 159 MG/DL
HCT VFR BLD AUTO: 45.3 %
HGB BLD-MCNC: 15.1 G/DL
LYMPHOCYTES # BLD AUTO: 0.9 K/UL
LYMPHOCYTES NFR BLD: 14.9 %
MCH RBC QN AUTO: 29.9 PG
MCHC RBC AUTO-ENTMCNC: 33.3 G/DL
MCV RBC AUTO: 90 FL
MONOCYTES # BLD AUTO: 0.5 K/UL
MONOCYTES NFR BLD: 8.5 %
NEUTROPHILS # BLD AUTO: 4.7 K/UL
NEUTROPHILS NFR BLD: 76.4 %
PLATELET # BLD AUTO: 195 K/UL
PMV BLD AUTO: 9.6 FL
POTASSIUM SERPL-SCNC: 3.5 MMOL/L
PROT SERPL-MCNC: 7.4 G/DL
RBC # BLD AUTO: 5.05 M/UL
SODIUM SERPL-SCNC: 138 MMOL/L
WBC # BLD AUTO: 6.1 K/UL

## 2018-09-27 PROCEDURE — 25000003 PHARM REV CODE 250: Performed by: EMERGENCY MEDICINE

## 2018-09-27 PROCEDURE — 36415 COLL VENOUS BLD VENIPUNCTURE: CPT

## 2018-09-27 PROCEDURE — 96360 HYDRATION IV INFUSION INIT: CPT

## 2018-09-27 PROCEDURE — 93005 ELECTROCARDIOGRAM TRACING: CPT

## 2018-09-27 PROCEDURE — 99284 EMERGENCY DEPT VISIT MOD MDM: CPT | Mod: 25

## 2018-09-27 PROCEDURE — 80053 COMPREHEN METABOLIC PANEL: CPT

## 2018-09-27 PROCEDURE — 82550 ASSAY OF CK (CPK): CPT

## 2018-09-27 PROCEDURE — 85025 COMPLETE CBC W/AUTO DIFF WBC: CPT

## 2018-09-27 PROCEDURE — 93010 ELECTROCARDIOGRAM REPORT: CPT | Mod: ,,, | Performed by: INTERNAL MEDICINE

## 2018-09-27 RX ADMIN — SODIUM CHLORIDE 1000 ML: 0.9 INJECTION, SOLUTION INTRAVENOUS at 12:09

## 2018-09-27 NOTE — ED PROVIDER NOTES
"SCRIBE #1 NOTE: I, Nikhil Bennett, am scribing for, and in the presence of, Cindy Jacinto MD. I have scribed the entire note.      History      Chief Complaint   Patient presents with    Fall     Pt states, "I had a shot for Osteoporosis on Tuesday and I have fallen twice since then, My legs feel weak and I feel terrible."       Review of patient's allergies indicates:   Allergen Reactions    Amitriptyline     Percocet [oxycodone-acetaminophen] Other (See Comments)     Seizures    Codeine Nausea Only and Rash        HPI   HPI    9/27/2018, 11:39 AM   History obtained from the patient      History of Present Illness: Leslie Wood is a 78 y.o. female patient who presents to the Emergency Department for an evaluation of dizziness which onset suddenly x2 days ago. Pt reports she has been falling since having a osteoporosis injection of Tuesday. Pt state she is unable to ambulate on her own because she feels unsteady on her feet. States she feels lightheaded but denies feeling like the room is spinning.  She thinks she is dehydrated she has been having multiple episodes of diarrhea, no blood no fever, no abdominal pain. Symptoms are constant and moderate in severity. Exacerbated by nothing and relieved by nothing. No associated sxs. Patient denies any fever, chills, CP, SOB, weakness/numbness, confusion, and all other sxs at this time. No further complaints or concerns at this time.       Arrival mode: Personal vehicle     PCP: Carla Vo MD       Past Medical History:  Past Medical History:   Diagnosis Date    Arthritis     Cataract     Diabetes mellitus     controled with diet    GERD (gastroesophageal reflux disease)     Hypertension        Past Surgical History:  Past Surgical History:   Procedure Laterality Date    ADENOIDECTOMY      ADRENAL GLAND SURGERY      APPENDECTOMY      BACK SURGERY      fusion l 4-5 s 1,2,3  fusion l 2-3    ESOPHAGOGASTRODUODENOSCOPY (EGD) N/A 5/17/2017    " Performed by Adi Wilcox III, MD at [facility] ENDO    EYE SURGERY      HEMORRHOID SURGERY      HERNIA REPAIR      HYSTERECTOMY      TONSILLECTOMY           Family History:  Family History   Problem Relation Age of Onset    Heart disease Mother     Hypertension Mother     Diabetes Mother     Hypertension Father     Kidney disease Father        Social History:  Social History     Tobacco Use    Smoking status: Never Smoker    Smokeless tobacco: Never Used   Substance and Sexual Activity    Alcohol use: No    Drug use: No    Sexual activity: Not Currently       ROS   Review of Systems   Constitutional: Negative for chills, diaphoresis and fever.   HENT: Negative for congestion and sore throat.    Eyes: Negative for photophobia and visual disturbance.   Respiratory: Negative for cough and shortness of breath.    Cardiovascular: Negative for chest pain.   Gastrointestinal: Negative for abdominal pain, nausea and vomiting.   Genitourinary: Negative for dysuria, frequency, hematuria and urgency.   Musculoskeletal: Positive for gait problem (Unsteady ). Negative for back pain and neck pain.   Skin: Negative for rash.   Neurological: Positive for dizziness. Negative for weakness, light-headedness and headaches.   Hematological: Does not bruise/bleed easily.   Psychiatric/Behavioral: Negative for confusion.     Physical Exam      Initial Vitals [09/27/18 1134]   BP Pulse Resp Temp SpO2   136/67 93 20 98.8 °F (37.1 °C) 95 %      MAP       --          Physical Exam  Nursing Notes and Vital Signs Reviewed.  Constitutional: Patient is in no acute distress. Well-developed and well-nourished.  Head: Atraumatic. Normocephalic.  Eyes: PERRL. EOM intact. Conjunctivae are not pale. No scleral icterus.  ENT: Mucous membranes are moist. Oropharynx is clear and symmetric.    Neck: Supple. Full ROM. No lymphadenopathy.  Cardiovascular: Regular rate. Regular rhythm. No murmurs, rubs, or gallops. Distal pulses are 2+ and  "symmetric.  Pulmonary/Chest: No respiratory distress. Clear to auscultation bilaterally. No wheezing or rales.  Abdominal: Soft and non-distended.  There is no tenderness.   Musculoskeletal: Moves all extremities. No obvious deformities. No edema. No calf tenderness.  Skin: Warm and dry.  Neurological:  Alert, awake, and appropriate.  Normal speech. Equal  strenghth. No dysmetria. No pronator drift.  No acute focal neurological deficits are appreciated.  Psychiatric: Normal affect. Good eye contact. Appropriate in content.    ED Course    Procedures  ED Vital Signs:  Vitals:    09/27/18 1134 09/27/18 1147 09/27/18 1148 09/27/18 1216   BP: 136/67 (!) 111/56     Pulse: 93 84 85 84   Resp: 20 19     Temp: 98.8 °F (37.1 °C)      TempSrc: Oral      SpO2: 95% 96%     Weight: 68 kg (150 lb)      Height: 5' 1" (1.549 m)       09/27/18 1218 09/27/18 1225 09/27/18 1227 09/27/18 1229   BP:  (!) 117/56 (!) 113/59 122/68   Pulse: 81 79 86 87   Resp:       Temp:       TempSrc:       SpO2:  95% 95% 98%   Weight:       Height:        09/27/18 1332 09/27/18 1350   BP: (!) 119/58 (!) 108/54   Pulse: 73 75   Resp: 18 20   Temp:  98.9 °F (37.2 °C)   TempSrc:  Oral   SpO2: 97% 96%   Weight:     Height:         Abnormal Lab Results:  Labs Reviewed   CBC W/ AUTO DIFFERENTIAL - Abnormal; Notable for the following components:       Result Value    Lymph # 0.9 (*)     Gran% 76.4 (*)     Lymph% 14.9 (*)     All other components within normal limits   COMPREHENSIVE METABOLIC PANEL - Abnormal; Notable for the following components:    CO2 20 (*)     Glucose 159 (*)     BUN, Bld 27 (*)     Creatinine 1.6 (*)     AST 48 (*)     eGFR if  35 (*)     eGFR if non  30 (*)     All other components within normal limits   CK - Abnormal; Notable for the following components:    CPK 1560 (*)     All other components within normal limits   URINALYSIS        All Lab Results:  Results for orders placed or performed during " the hospital encounter of 09/27/18   CBC auto differential   Result Value Ref Range    WBC 6.10 3.90 - 12.70 K/uL    RBC 5.05 4.00 - 5.40 M/uL    Hemoglobin 15.1 12.0 - 16.0 g/dL    Hematocrit 45.3 37.0 - 48.5 %    MCV 90 82 - 98 fL    MCH 29.9 27.0 - 31.0 pg    MCHC 33.3 32.0 - 36.0 g/dL    RDW 14.2 11.5 - 14.5 %    Platelets 195 150 - 350 K/uL    MPV 9.6 9.2 - 12.9 fL    Gran # (ANC) 4.7 1.8 - 7.7 K/uL    Lymph # 0.9 (L) 1.0 - 4.8 K/uL    Mono # 0.5 0.3 - 1.0 K/uL    Eos # 0.0 0.0 - 0.5 K/uL    Baso # 0.01 0.00 - 0.20 K/uL    Gran% 76.4 (H) 38.0 - 73.0 %    Lymph% 14.9 (L) 18.0 - 48.0 %    Mono% 8.5 4.0 - 15.0 %    Eosinophil% 0.0 0.0 - 8.0 %    Basophil% 0.2 0.0 - 1.9 %    Differential Method Automated    Comprehensive metabolic panel   Result Value Ref Range    Sodium 138 136 - 145 mmol/L    Potassium 3.5 3.5 - 5.1 mmol/L    Chloride 104 95 - 110 mmol/L    CO2 20 (L) 23 - 29 mmol/L    Glucose 159 (H) 70 - 110 mg/dL    BUN, Bld 27 (H) 8 - 23 mg/dL    Creatinine 1.6 (H) 0.5 - 1.4 mg/dL    Calcium 8.7 8.7 - 10.5 mg/dL    Total Protein 7.4 6.0 - 8.4 g/dL    Albumin 3.7 3.5 - 5.2 g/dL    Total Bilirubin 0.5 0.1 - 1.0 mg/dL    Alkaline Phosphatase 62 55 - 135 U/L    AST 48 (H) 10 - 40 U/L    ALT 26 10 - 44 U/L    Anion Gap 14 8 - 16 mmol/L    eGFR if African American 35 (A) >60 mL/min/1.73 m^2    eGFR if non African American 30 (A) >60 mL/min/1.73 m^2   CPK   Result Value Ref Range    CPK 1560 (H) 20 - 180 U/L              The EKG was ordered, reviewed, and independently interpreted by the ED provider.  Interpretation time: 12:18  Rate: 81 BPM  Rhythm: normal sinus rhythm  Interpretation: Normal axis. No STEMI.      The Emergency Provider reviewed the vital signs and test results, which are outlined above.    ED Discussion     1:18 PM: Re-evaluated pt. Pt is resting comfortably and is in no acute distress.  Pt states she is feeling much better. She was able to ambulate without difficulty to provide a urine sample. She  is feeling much better.  D/w pt all pertinent results. D/w pt any concerns expressed at this time. Answered all questions. Pt expresses understanding at this time.    1:47 PM: Reassessed pt at this time. Pt is awake, alert, and in NAD. Pt states her condition has improved at this time. Discussed with pt all pertinent ED information and results. Discussed pt dx and plan of tx. Gave pt all f/u and return to the ED instructions. All questions and concerns were addressed at this time. Pt expresses understanding of information and instructions, and is comfortable with plan to discharge. Pt is stable for discharge.    I discussed with patient and/or family/caretaker that evaluation in the ED does not suggest any emergent or life threatening medical conditions requiring immediate intervention beyond what was provided in the ED, and I believe patient is safe for discharge.  Regardless, an unremarkable evaluation in the ED does not preclude the development or presence of a serious of life threatening condition. As such, patient was instructed to return immediately for any worsening or change in current symptoms.      ED Medication(s):  Medications   sodium chloride 0.9% bolus 1,000 mL (0 mLs Intravenous Stopped 9/27/18 8203)       Follow-up Information     Carla Vo MD. Schedule an appointment as soon as possible for a visit in 1 day.    Specialty:  Internal Medicine  Why:  Return to the Emergency Room, If symptoms worsen  Contact information:  [address] [facility] [address] [phone]                     Medical Decision Making    Medical Decision Making:   Clinical Tests:   Lab Tests: Ordered and Reviewed  Medical Tests: Ordered and Reviewed           Scribe Attestation:   Scribe #1: I performed the above scribed service and the documentation accurately describes the services I performed. I attest to the accuracy of the note.    Attending:   Physician Attestation Statement for Scribe #1: Cindy HERNANDEZ  MD [place], personally performed the services described in this documentation, as scribed by Nikhil Bennett, in my presence, and it is both accurate and complete.          Clinical Impression       ICD-10-CM ICD-9-CM   1. Intravascular volume depletion E86.1 276.52   2. Weakness R53.1 780.79   3. Acute kidney injury N17.9 584.9       Disposition:   Disposition: Discharged  Condition: Stable         Cindy Jacinto MD  09/27/18 8286

## 2019-05-29 ENCOUNTER — HOSPITAL ENCOUNTER (EMERGENCY)
Facility: HOSPITAL | Age: 79
Discharge: HOME | End: 2019-05-29
Attending: EMERGENCY MEDICINE
Payer: MEDICARE

## 2019-05-29 VITALS
DIASTOLIC BLOOD PRESSURE: 86 MMHG | TEMPERATURE: 100 F | WEIGHT: 149 LBS | HEART RATE: 70 BPM | BODY MASS INDEX: 28.13 KG/M2 | SYSTOLIC BLOOD PRESSURE: 140 MMHG | OXYGEN SATURATION: 99 % | RESPIRATION RATE: 18 BRPM | HEIGHT: 61 IN

## 2019-05-29 DIAGNOSIS — S46.912A STRAIN OF LEFT SHOULDER, INITIAL ENCOUNTER: Primary | ICD-10-CM

## 2019-05-29 PROCEDURE — 96372 THER/PROPH/DIAG INJ SC/IM: CPT

## 2019-05-29 PROCEDURE — 63600175 PHARM REV CODE 636 W HCPCS: Performed by: EMERGENCY MEDICINE

## 2019-05-29 PROCEDURE — 99284 EMERGENCY DEPT VISIT MOD MDM: CPT | Mod: 25

## 2019-05-29 PROCEDURE — 25000003 PHARM REV CODE 250: Performed by: EMERGENCY MEDICINE

## 2019-05-29 RX ORDER — KETOROLAC TROMETHAMINE 30 MG/ML
30 INJECTION, SOLUTION INTRAMUSCULAR; INTRAVENOUS
Status: COMPLETED | OUTPATIENT
Start: 2019-05-29 | End: 2019-05-29

## 2019-05-29 RX ORDER — DIAZEPAM 5 MG/1
5 TABLET ORAL
Status: COMPLETED | OUTPATIENT
Start: 2019-05-29 | End: 2019-05-29

## 2019-05-29 RX ADMIN — DIAZEPAM 5 MG: 5 TABLET ORAL at 09:05

## 2019-05-29 RX ADMIN — KETOROLAC TROMETHAMINE 30 MG: 30 INJECTION, SOLUTION INTRAMUSCULAR; INTRAVENOUS at 09:05

## 2019-05-30 ENCOUNTER — PES CALL (OUTPATIENT)
Dept: ADMINISTRATIVE | Facility: CLINIC | Age: 79
End: 2019-05-30

## 2019-05-30 NOTE — ED NOTES
Pt in NAD, Resp e/u. Family at bedside. Stretcher locked in lowest position. Side rails up x2. Call bell within reach. Will continue to monitor.

## 2019-05-30 NOTE — ED PROVIDER NOTES
"SCRIBE #1 NOTE: I, Christy Mcnair, am scribing for, and in the presence of, Saira Jimenez MD. I have scribed the entire note.       History     Chief Complaint   Patient presents with    Shoulder Pain     pt had lower back surgery on 5/24/19. Pt states that her pain stimulator in back will not be turned on until 5/31/19. Pt reports using her left arm to lift herself out of bed, causing new onset of pain in left shoulder.      Review of patient's allergies indicates:   Allergen Reactions    Amitriptyline     Hydrochlorothiazide      Causes muscle cramping    Lisinopril      hyperkalemia    Percocet [oxycodone-acetaminophen] Other (See Comments)     Seizures    Codeine Nausea Only and Rash         History of Present Illness     HPI    5/29/2019, 8:57 PM  History obtained from the patient and daughter      History of Present Illness: Leslie Wood is a 78 y.o. female patient with a PMHx of DM (controlled with diet), GERD, arthritis, and s/p low back surgery (5/24/19) who presents to the Emergency Department for evaluation of L posterior shoulder pain which onset gradually this morning. Pain is described as stabbing. Pt reports that she recently had back surgery on 5/24/19 to replace the batteries in her back stimulator and add further wires. Pt states that since her surgery she has had to place weight on her L shoulder to rise out of bed. Pt reports that she has "pretty much been bed-ridden" over the past 5 days. Symptoms are constant and severe. Pain is increased with resting flat on her back or with deep breathing. No mitigating factors reported. Associated sxs include back pain. Patient denies any injury, neck pain/ stiffness, CP, SOB, chest tightness, cough, palpations, BLE edema, HA, n/v/d, abdominal pain, dysuria, hematuria, bladder/ bowel incontinence, fever/ chills, joint swelling, congestion, wheezing, and all other sxs at this time. Prior Tx includes an icy hot pain patch to her L posterior shoulder. No " further complaints or concerns at this time.     Arrival mode: Personal vehicle     PCP: Carla Vo MD        Past Medical History:  Past Medical History:   Diagnosis Date    Arthritis     Cataract     Diabetes mellitus     controled with diet    GERD (gastroesophageal reflux disease)     Hypertension        Past Surgical History:  Past Surgical History:   Procedure Laterality Date    ADENOIDECTOMY      ADRENAL GLAND SURGERY      APPENDECTOMY      BACK SURGERY      fusion l 4-5 s 1,2,3  fusion l 2-3    ESOPHAGOGASTRODUODENOSCOPY (EGD) N/A 5/17/2017    Performed by Adi Wilcox III, MD at [facility]    EYE SURGERY      HEMORRHOID SURGERY      HERNIA REPAIR      HYSTERECTOMY      TONSILLECTOMY           Family History:  Family History   Problem Relation Age of Onset    Heart disease Mother     Hypertension Mother     Diabetes Mother     Hypertension Father     Kidney disease Father        Social History:  Social History     Tobacco Use    Smoking status: Never Smoker    Smokeless tobacco: Never Used   Substance and Sexual Activity    Alcohol use: No    Drug use: No    Sexual activity: Not Currently        Review of Systems     Review of Systems   Constitutional: Negative for chills and fever.   HENT: Negative for congestion and sore throat.    Respiratory: Negative for cough, shortness of breath and wheezing.    Cardiovascular: Negative for chest pain, palpitations and leg swelling.   Gastrointestinal: Negative for abdominal pain, diarrhea, nausea and vomiting.   Genitourinary: Negative for dysuria and hematuria.   Musculoskeletal: Positive for arthralgias (L posterior shoulder) and back pain. Negative for joint swelling, neck pain and neck stiffness.        - recent injuries   Skin: Negative for rash.   Neurological: Negative for dizziness, weakness, light-headedness, numbness and headaches.        - bladder/ bowel incontinence   Hematological: Does not bruise/bleed easily.   All other  "systems reviewed and are negative.     Physical Exam     Initial Vitals   BP Pulse Resp Temp SpO2   05/29/19 2254 05/29/19 2012 05/29/19 2012 05/29/19 2012 05/29/19 2012   (!) 140/86 86 20 99.9 °F (37.7 °C) 95 %      MAP       --                 Physical Exam  Nursing Notes and Vital Signs Reviewed.  Constitutional: Patient is in mild distress. Well-developed and well-nourished.  Head: Atraumatic. Normocephalic.  Eyes: PERRL. EOM intact. Conjunctivae are not pale. No scleral icterus.  ENT: Mucous membranes are moist. Oropharynx is clear and symmetric.    Neck: Supple. Full ROM. No lymphadenopathy.  Back: No step-offs. No midline spinal tenderness.   Cardiovascular: Regular rate. Regular rhythm. No murmurs, rubs, or gallops. Distal pulses are 2+ and symmetric.  Pulmonary/Chest: No respiratory distress. Clear to auscultation bilaterally. No wheezing or rales.  Abdominal: Soft and non-distended.  There is no tenderness.  No rebound, guarding, or rigidity. Good bowel sounds.  Genitourinary: No CVA tenderness  Musculoskeletal: Moves all extremities. No obvious deformities. No edema. No calf tenderness.  Pain with range of motion of the L inferior scapula.   Skin: Warm and dry.  Neurological:  Alert, awake, and appropriate.  Normal speech.  No acute focal neurological deficits are appreciated.  Psychiatric: Normal affect. Good eye contact. Appropriate in content.     ED Course   Procedures  ED Vital Signs:  Vitals:    05/29/19 2012 05/29/19 2254   BP:  (!) 140/86   Pulse: 86 70   Resp: 20 18   Temp: 99.9 °F (37.7 °C)    TempSrc: Oral    SpO2: 95% 99%   Weight: 67.6 kg (149 lb)    Height: 5' 1" (1.549 m)             The Emergency Provider reviewed the vital signs and test results, which are outlined above.     ED Discussion     10:23 PM: Reevaluated pt. Pt reports that she is feeling much better. Pt is resting comfortably and in no distress. Discussed with pt all pertinent ED information and results. Discussed pt dx and " plan of tx. Gave pt all f/u and return to the ED instructions. All questions and concerns were addressed at this time. Pt expresses understanding of information and instructions, and is comfortable with plan to discharge. Pt is stable for discharge.    I discussed with patient and/or family/caretaker that evaluation in the ED does not suggest any emergent or life threatening medical conditions requiring immediate intervention beyond what was provided in the ED, and I believe patient is safe for discharge.  Regardless, an unremarkable evaluation in the ED does not preclude the development or presence of a serious of life threatening condition. As such, patient was instructed to return immediately for any worsening or change in current symptoms.      ED Medication(s):  Medications   ketorolac injection 30 mg (30 mg Intramuscular Given 5/29/19 2121)   diazePAM tablet 5 mg (5 mg Oral Given 5/29/19 2120)       Discharge Medication List as of 5/29/2019 10:23 PM          Follow-up Information     Carla Vo MD In 2 days.    Specialty:  Internal Medicine  Contact information:  [address] [facility] [address] [phone]             [facility]    Specialty:  Emergency Medicine  Why:  As needed, If symptoms worsen  Contact information:  [address] [facility] [address] [phone]                                   Scribe Attestation:   Scribe #1: I performed the above scribed service and the documentation accurately describes the services I performed. I attest to the accuracy of the note.     Attending:   Physician Attestation Statement for Scribe #1: I, Saira Jimenez MD, personally performed the services described in this documentation, as scribed by Christy Mcnair, in my presence, and it is both accurate and complete.           Clinical Impression       ICD-10-CM ICD-9-CM   1. Strain of left shoulder, initial encounter S46.912A 840.9       Disposition:    Disposition: Discharged  Condition: Stable         Saira Jimenez MD  05/30/19 2029

## 2019-05-30 NOTE — ED NOTES
Patient identifiers verified and correct for Leslie Wood.    Pt reports using her left arm to lift herself out of bed, causing new onset of pain in left shoulder.    LOC: The patient is awake, alert and aware of environment with an appropriate affect, the patient is oriented x 3 and speaking appropriately.  APPEARANCE: Patient resting comfortably and in no acute distress, patient is clean and well groomed, patient's clothing is properly fastened.  SKIN: The skin is warm and dry, color consistent with ethnicity, patient has normal skin turgor and moist mucus membranes, skin intact, no breakdown or bruising noted.  MUSCULOSKELETAL: Patient moving all extremities spontaneously. L shoulder pain.   RESPIRATORY: Airway is open and patent, respirations are spontaneous.  CARDIAC: Patient has a normal rate, no periphreal edema noted, capillary refill < 3 seconds.  ABDOMEN: Soft and non tender to palpation.

## 2019-06-24 ENCOUNTER — OFFICE VISIT (OUTPATIENT)
Dept: INTERNAL MEDICINE | Facility: CLINIC | Age: 79
End: 2019-06-24
Payer: MEDICARE

## 2019-06-24 ENCOUNTER — LAB VISIT (OUTPATIENT)
Dept: LAB | Facility: HOSPITAL | Age: 79
End: 2019-06-24
Attending: FAMILY MEDICINE
Payer: MEDICARE

## 2019-06-24 VITALS
SYSTOLIC BLOOD PRESSURE: 156 MMHG | TEMPERATURE: 97 F | BODY MASS INDEX: 29.95 KG/M2 | WEIGHT: 158.5 LBS | HEART RATE: 78 BPM | DIASTOLIC BLOOD PRESSURE: 82 MMHG | OXYGEN SATURATION: 96 %

## 2019-06-24 DIAGNOSIS — D72.828 OTHER ELEVATED WHITE BLOOD CELL (WBC) COUNT: ICD-10-CM

## 2019-06-24 DIAGNOSIS — R73.03 PREDIABETES: ICD-10-CM

## 2019-06-24 DIAGNOSIS — I10 ESSENTIAL HYPERTENSION: Primary | ICD-10-CM

## 2019-06-24 DIAGNOSIS — Z76.89 ESTABLISHING CARE WITH NEW DOCTOR, ENCOUNTER FOR: ICD-10-CM

## 2019-06-24 DIAGNOSIS — Z23 NEED FOR TETANUS BOOSTER: ICD-10-CM

## 2019-06-24 DIAGNOSIS — I10 ESSENTIAL HYPERTENSION: ICD-10-CM

## 2019-06-24 DIAGNOSIS — F51.01 PRIMARY INSOMNIA: ICD-10-CM

## 2019-06-24 PROBLEM — Z87.898 HISTORY OF PREDIABETES: Status: ACTIVE | Noted: 2017-05-11

## 2019-06-24 PROBLEM — E55.9 VITAMIN D DEFICIENCY: Status: ACTIVE | Noted: 2019-06-24

## 2019-06-24 LAB
ALBUMIN SERPL BCP-MCNC: 3.9 G/DL (ref 3.5–5.2)
ALP SERPL-CCNC: 82 U/L (ref 55–135)
ALT SERPL W/O P-5'-P-CCNC: 16 U/L (ref 10–44)
ANION GAP SERPL CALC-SCNC: 10 MMOL/L (ref 8–16)
AST SERPL-CCNC: 18 U/L (ref 10–40)
BASOPHILS # BLD AUTO: 0.04 K/UL (ref 0–0.2)
BASOPHILS NFR BLD: 0.5 % (ref 0–1.9)
BILIRUB SERPL-MCNC: 0.3 MG/DL (ref 0.1–1)
BUN SERPL-MCNC: 14 MG/DL (ref 8–23)
CALCIUM SERPL-MCNC: 10.2 MG/DL (ref 8.7–10.5)
CHLORIDE SERPL-SCNC: 107 MMOL/L (ref 95–110)
CO2 SERPL-SCNC: 28 MMOL/L (ref 23–29)
CREAT SERPL-MCNC: 0.8 MG/DL (ref 0.5–1.4)
DIFFERENTIAL METHOD: ABNORMAL
EOSINOPHIL # BLD AUTO: 0.1 K/UL (ref 0–0.5)
EOSINOPHIL NFR BLD: 1.1 % (ref 0–8)
ERYTHROCYTE [DISTWIDTH] IN BLOOD BY AUTOMATED COUNT: 13.9 % (ref 11.5–14.5)
EST. GFR  (AFRICAN AMERICAN): >60 ML/MIN/1.73 M^2
EST. GFR  (NON AFRICAN AMERICAN): >60 ML/MIN/1.73 M^2
ESTIMATED AVG GLUCOSE: 120 MG/DL (ref 68–131)
GLUCOSE SERPL-MCNC: 95 MG/DL (ref 70–110)
HBA1C MFR BLD HPLC: 5.8 % (ref 4–5.6)
HCT VFR BLD AUTO: 42.8 % (ref 37–48.5)
HGB BLD-MCNC: 13.4 G/DL (ref 12–16)
IMM GRANULOCYTES # BLD AUTO: 0.03 K/UL (ref 0–0.04)
IMM GRANULOCYTES NFR BLD AUTO: 0.4 % (ref 0–0.5)
LYMPHOCYTES # BLD AUTO: 2.2 K/UL (ref 1–4.8)
LYMPHOCYTES NFR BLD: 27.7 % (ref 18–48)
MCH RBC QN AUTO: 27.6 PG (ref 27–31)
MCHC RBC AUTO-ENTMCNC: 31.3 G/DL (ref 32–36)
MCV RBC AUTO: 88 FL (ref 82–98)
MONOCYTES # BLD AUTO: 1 K/UL (ref 0.3–1)
MONOCYTES NFR BLD: 12.4 % (ref 4–15)
NEUTROPHILS # BLD AUTO: 4.6 K/UL (ref 1.8–7.7)
NEUTROPHILS NFR BLD: 57.9 % (ref 38–73)
NRBC BLD-RTO: 0 /100 WBC
PLATELET # BLD AUTO: 299 K/UL (ref 150–350)
PMV BLD AUTO: 10.3 FL (ref 9.2–12.9)
POTASSIUM SERPL-SCNC: 3.8 MMOL/L (ref 3.5–5.1)
PROT SERPL-MCNC: 7.5 G/DL (ref 6–8.4)
RBC # BLD AUTO: 4.85 M/UL (ref 4–5.4)
SODIUM SERPL-SCNC: 145 MMOL/L (ref 136–145)
WBC # BLD AUTO: 7.93 K/UL (ref 3.9–12.7)

## 2019-06-24 PROCEDURE — 99214 OFFICE O/P EST MOD 30 MIN: CPT | Mod: PBBFAC | Performed by: FAMILY MEDICINE

## 2019-06-24 PROCEDURE — 90714 TD VACC NO PRESV 7 YRS+ IM: CPT | Mod: PBBFAC

## 2019-06-24 PROCEDURE — 83036 HEMOGLOBIN GLYCOSYLATED A1C: CPT

## 2019-06-24 PROCEDURE — 36415 COLL VENOUS BLD VENIPUNCTURE: CPT

## 2019-06-24 PROCEDURE — 85025 COMPLETE CBC W/AUTO DIFF WBC: CPT

## 2019-06-24 PROCEDURE — 99999 PR PBB SHADOW E&M-EST. PATIENT-LVL IV: CPT | Mod: PBBFAC,,, | Performed by: FAMILY MEDICINE

## 2019-06-24 PROCEDURE — 99214 PR OFFICE/OUTPT VISIT, EST, LEVL IV, 30-39 MIN: ICD-10-PCS | Mod: S$PBB,ICN,, | Performed by: FAMILY MEDICINE

## 2019-06-24 PROCEDURE — 99999 PR PBB SHADOW E&M-EST. PATIENT-LVL IV: ICD-10-PCS | Mod: PBBFAC,,, | Performed by: FAMILY MEDICINE

## 2019-06-24 PROCEDURE — 99214 OFFICE O/P EST MOD 30 MIN: CPT | Mod: S$PBB,ICN,, | Performed by: FAMILY MEDICINE

## 2019-06-24 PROCEDURE — 80053 COMPREHEN METABOLIC PANEL: CPT

## 2019-06-24 RX ORDER — TRAZODONE HYDROCHLORIDE 50 MG/1
50 TABLET ORAL NIGHTLY PRN
Qty: 90 TABLET | Refills: 1 | Status: SHIPPED | OUTPATIENT
Start: 2019-06-24 | End: 2019-12-19

## 2019-06-24 RX ORDER — HYDRALAZINE HYDROCHLORIDE 25 MG/1
25 TABLET, FILM COATED ORAL EVERY 12 HOURS
Qty: 180 TABLET | Refills: 1 | Status: SHIPPED | OUTPATIENT
Start: 2019-06-24 | End: 2019-08-08 | Stop reason: SDUPTHER

## 2019-06-24 RX ORDER — HYDRALAZINE HYDROCHLORIDE 25 MG/1
25 TABLET, FILM COATED ORAL
COMMUNITY
Start: 2019-02-26 | End: 2019-06-24 | Stop reason: SDUPTHER

## 2019-06-24 RX ORDER — AMLODIPINE BESYLATE 5 MG/1
5 TABLET ORAL DAILY
Qty: 90 TABLET | Refills: 1 | Status: SHIPPED | OUTPATIENT
Start: 2019-06-24 | End: 2019-07-22 | Stop reason: SDUPTHER

## 2019-06-24 RX ORDER — CHOLECALCIFEROL (VITAMIN D3) 25 MCG
1000 TABLET ORAL
COMMUNITY
End: 2020-10-14

## 2019-06-24 RX ORDER — AMLODIPINE BESYLATE 5 MG/1
5 TABLET ORAL
COMMUNITY
Start: 2019-06-17 | End: 2019-06-24 | Stop reason: SDUPTHER

## 2019-06-24 RX ORDER — TRAZODONE HYDROCHLORIDE 100 MG/1
TABLET ORAL
COMMUNITY
Start: 2019-04-26 | End: 2019-06-24 | Stop reason: SDUPTHER

## 2019-06-24 NOTE — Clinical Note
Not sure if she is on the registry I removed the diagnosis of diabetes from her problem list I cant find an A1c that indicates diabetes

## 2019-06-24 NOTE — PROGRESS NOTES
Subjective:       Patient ID: Leslie Wood is a 79 y.o. female.    Chief Complaint: Follow-up    HPI Ms. Wood presents today to establish care. She has a medical history as listed below.     Her previous doctor gave her Lipitor   She had horrible leg pain and cramps.     She has done well with controlling her blood sugar.   Diabetes has been controlled with diet.     Her lisinopril was discontinued   Hydralazine was added Twice a day  Amlodipine once a day    Zanaflex she takes nightly   She is followed by pain management    Saw ENT in the past  left ear decreased hearing test   Cerumen right ear    Review of Systems   Constitutional: Negative for activity change, appetite change, fatigue and fever.   HENT: Negative for congestion, ear pain and sinus pressure.    Eyes: Negative for pain and visual disturbance.   Respiratory: Negative for cough, chest tightness and wheezing.    Cardiovascular: Negative for chest pain, palpitations and leg swelling.   Gastrointestinal: Negative for abdominal distention, abdominal pain, constipation, diarrhea, nausea and vomiting.   Endocrine: Negative for polydipsia and polyuria.   Genitourinary: Negative for difficulty urinating, dyspareunia, dysuria, flank pain and hematuria.   Musculoskeletal: Negative for arthralgias and back pain.   Skin: Negative for rash.   Neurological: Negative for dizziness, tremors, syncope, weakness, numbness and headaches.   Psychiatric/Behavioral: Negative for agitation and confusion.           Past Medical History:   Diagnosis Date    Arthritis     Cataract     GERD (gastroesophageal reflux disease)     Hypertension      Past Surgical History:   Procedure Laterality Date    ADENOIDECTOMY      ADRENAL GLAND SURGERY      APPENDECTOMY      BACK SURGERY      fusion l 4-5 s 1,2,3  fusion l 2-3    ESOPHAGOGASTRODUODENOSCOPY (EGD) N/A 5/17/2017    Performed by [facility] MD at [facility] END[facility]    EYE SURGERY      HEMORRHOID SURGERY       HERNIA REPAIR      HYSTERECTOMY      TONSILLECTOMY       Family History   Problem Relation Age of Onset    Heart disease Mother     Hypertension Mother     Diabetes Mother     Hypertension Father     Kidney disease Father      Social History     Socioeconomic History    Marital status:      Spouse name: Not on file    Number of children: Not on file    Years of education: Not on file    Highest education level: Not on file   Occupational History    Not on file   Social Needs    Financial resource strain: Not on file    Food insecurity:     Worry: Not on file     Inability: Not on file    Transportation needs:     Medical: Not on file     Non-medical: Not on file   Tobacco Use    Smoking status: Never Smoker    Smokeless tobacco: Never Used   Substance and Sexual Activity    Alcohol use: No    Drug use: No    Sexual activity: Not Currently   Lifestyle    Physical activity:     Days per week: Not on file     Minutes per session: Not on file    Stress: Not on file   Relationships    Social connections:     Talks on phone: Not on file     Gets together: Not on file     Attends Baptism service: Not on file     Active member of club or organization: Not on file     Attends meetings of clubs or organizations: Not on file     Relationship status: Not on file   Other Topics Concern    Not on file   Social History Narrative    Not on file       Objective:        Physical Exam   Constitutional: She is oriented to person, place, and time. She appears well-nourished. No distress.   HENT:   Head: Normocephalic and atraumatic.   Right Ear: External ear normal.   Left Ear: External ear normal.   Nose: Nose normal.   Mouth/Throat: Oropharynx is clear and moist.   Cerumen right ear   Eyes: Pupils are equal, round, and reactive to light. EOM are normal. Right eye exhibits no discharge. Left eye exhibits no discharge.   Neck: Normal range of motion. Neck supple. No thyromegaly present.    Cardiovascular: Normal rate and regular rhythm.   Murmur heard.  Pulmonary/Chest: Effort normal and breath sounds normal. No respiratory distress. She has no wheezes.   Abdominal: Soft. Bowel sounds are normal. She exhibits no distension. There is no tenderness.   Musculoskeletal: Normal range of motion. She exhibits no edema.   Neurological: She is alert and oriented to person, place, and time. Coordination normal.   Skin: Skin is warm and dry. No rash noted.   Psychiatric: She has a normal mood and affect. Her behavior is normal.   Nursing note and vitals reviewed.        Results for orders placed or performed during the hospital encounter of 09/27/18   CBC auto differential   Result Value Ref Range    WBC 6.10 3.90 - 12.70 K/uL    RBC 5.05 4.00 - 5.40 M/uL    Hemoglobin 15.1 12.0 - 16.0 g/dL    Hematocrit 45.3 37.0 - 48.5 %    Mean Corpuscular Volume 90 82 - 98 fL    Mean Corpuscular Hemoglobin 29.9 27.0 - 31.0 pg    Mean Corpuscular Hemoglobin Conc 33.3 32.0 - 36.0 g/dL    RDW 14.2 11.5 - 14.5 %    Platelets 195 150 - 350 K/uL    MPV 9.6 9.2 - 12.9 fL    Gran # (ANC) 4.7 1.8 - 7.7 K/uL    Lymph # 0.9 (L) 1.0 - 4.8 K/uL    Mono # 0.5 0.3 - 1.0 K/uL    Eos # 0.0 0.0 - 0.5 K/uL    Baso # 0.01 0.00 - 0.20 K/uL    Gran% 76.4 (H) 38.0 - 73.0 %    Lymph% 14.9 (L) 18.0 - 48.0 %    Mono% 8.5 4.0 - 15.0 %    Eosinophil% 0.0 0.0 - 8.0 %    Basophil% 0.2 0.0 - 1.9 %    Differential Method Automated    Comprehensive metabolic panel   Result Value Ref Range    Sodium 138 136 - 145 mmol/L    Potassium 3.5 3.5 - 5.1 mmol/L    Chloride 104 95 - 110 mmol/L    CO2 20 (L) 23 - 29 mmol/L    Glucose 159 (H) 70 - 110 mg/dL    BUN, Bld 27 (H) 8 - 23 mg/dL    Creatinine 1.6 (H) 0.5 - 1.4 mg/dL    Calcium 8.7 8.7 - 10.5 mg/dL    Total Protein 7.4 6.0 - 8.4 g/dL    Albumin 3.7 3.5 - 5.2 g/dL    Total Bilirubin 0.5 0.1 - 1.0 mg/dL    Alkaline Phosphatase 62 55 - 135 U/L    AST 48 (H) 10 - 40 U/L    ALT 26 10 - 44 U/L    Anion Gap 14 8 -  16 mmol/L    eGFR if African American 35 (A) >60 mL/min/1.73 m^2    eGFR if non African American 30 (A) >60 mL/min/1.73 m^2   CPK   Result Value Ref Range    CPK 1560 (H) 20 - 180 U/L       Assessment/Plan:     Essential hypertension-reports it is usually controlled  She did not take one of her medications because she was out.   Follow up in 2 weeks [place]   -     hydrALAZINE (APRESOLINE) 25 MG tablet; Take 1 tablet (25 mg total) by mouth every 12 (twelve) hours.  Dispense: 180 tablet; Refill: 1  -     amLODIPine (NORVASC) 5 MG tablet; Take 1 tablet (5 mg total) by mouth once daily.  Dispense: 90 tablet; Refill: 1  -     Comprehensive metabolic panel; Future; Expected date: 06/24/2019    Primary insomnia  -     traZODone (DESYREL) 50 MG tablet; Take 1 tablet (50 mg total) by mouth nightly as needed for Insomnia.  Dispense: 90 tablet; Refill: 1  This has been working but she would like lower dose    Need for tetanus booster  -     (In Office Administered) Td Vaccine - Preservative Free    Prediabetes  -     Comprehensive metabolic panel; Future; Expected date: 06/24/2019  -     Hemoglobin A1c; Future; Expected date: 06/24/2019    Other elevated white blood cell (WBC) count  -     CBC auto differential; Future; Expected date: 06/24/2019    Asymmetrical hearing loss of right ear  -     Ambulatory Referral to ENT    Establishing care with new doctor, encounter for  Reviwed medical, social, surgical and family history. Reviewed health maintenance         Follow up in about 3 months (around 9/24/2019).    Angeles Carson MD  ON   Family Medicine

## 2019-06-25 ENCOUNTER — CLINICAL SUPPORT (OUTPATIENT)
Dept: AUDIOLOGY | Facility: CLINIC | Age: 79
End: 2019-06-25
Payer: MEDICARE

## 2019-06-25 ENCOUNTER — OFFICE VISIT (OUTPATIENT)
Dept: OTOLARYNGOLOGY | Facility: CLINIC | Age: 79
End: 2019-06-25
Payer: MEDICARE

## 2019-06-25 VITALS — WEIGHT: 156 LBS | BODY MASS INDEX: 29.45 KG/M2 | HEIGHT: 61 IN | TEMPERATURE: 98 F

## 2019-06-25 DIAGNOSIS — H90.3 SENSORINEURAL HEARING LOSS (SNHL) OF BOTH EARS: ICD-10-CM

## 2019-06-25 DIAGNOSIS — H90.3 SENSORY HEARING LOSS, BILATERAL: Primary | ICD-10-CM

## 2019-06-25 DIAGNOSIS — H61.21 IMPACTED CERUMEN OF RIGHT EAR: Primary | ICD-10-CM

## 2019-06-25 PROCEDURE — 92557 COMPREHENSIVE HEARING TEST: CPT | Mod: PBBFAC | Performed by: AUDIOLOGIST

## 2019-06-25 PROCEDURE — 69210 PR REMOVAL IMPACTED CERUMEN REQUIRING INSTRUMENTATION, UNILATERAL: ICD-10-PCS | Mod: S$PBB,,, | Performed by: PHYSICIAN ASSISTANT

## 2019-06-25 PROCEDURE — 99213 OFFICE O/P EST LOW 20 MIN: CPT | Mod: PBBFAC,25 | Performed by: PHYSICIAN ASSISTANT

## 2019-06-25 PROCEDURE — 99999 PR PBB SHADOW E&M-EST. PATIENT-LVL III: ICD-10-PCS | Mod: PBBFAC,,, | Performed by: PHYSICIAN ASSISTANT

## 2019-06-25 PROCEDURE — 69210 REMOVE IMPACTED EAR WAX UNI: CPT | Mod: PBBFAC | Performed by: PHYSICIAN ASSISTANT

## 2019-06-25 PROCEDURE — 69210 REMOVE IMPACTED EAR WAX UNI: CPT | Mod: S$PBB,,, | Performed by: PHYSICIAN ASSISTANT

## 2019-06-25 PROCEDURE — 92567 TYMPANOMETRY: CPT | Mod: PBBFAC | Performed by: AUDIOLOGIST

## 2019-06-25 PROCEDURE — 99999 PR PBB SHADOW E&M-EST. PATIENT-LVL III: CPT | Mod: PBBFAC,,, | Performed by: PHYSICIAN ASSISTANT

## 2019-06-25 PROCEDURE — 99203 PR OFFICE/OUTPT VISIT, NEW, LEVL III, 30-44 MIN: ICD-10-PCS | Mod: 25,S$PBB,, | Performed by: PHYSICIAN ASSISTANT

## 2019-06-25 PROCEDURE — 99203 OFFICE O/P NEW LOW 30 MIN: CPT | Mod: 25,S$PBB,, | Performed by: PHYSICIAN ASSISTANT

## 2019-06-25 NOTE — PROGRESS NOTES
Leslie Wood was seen 06/25/2019 for an audiological evaluation.  Patient was diagnosed with an asymmetrical SNHL (AD worse) in August of 2018.  She came in today for wax removal in the right ear as well as an updated audiogram.  She reports bilateral constant tinnitus which does not keep her awake at night.  She denies dizziness.    Results reveal a mild-to-severe sensorineural hearing loss 250-8000 Hz for the right ear, and  mild-to-moderate sensorineural hearing loss 250-8000 Hz for the left ear.   Speech Reception Thresholds were  25 dBHL for the right ear and 20 dBHL for the left ear.   Word recognition scores were good for the right ear and excellent for the left ear.   Tympanograms were Type A for the right ear and Type A for the left ear.    Patient was counseled on the above findings.    REC:  ENT review of audiogram  Repeat audiogram in 6 months due to history per ENT

## 2019-06-25 NOTE — PROGRESS NOTES
Referring Provider:    Angeles Carson Md  [address] [place] [address]  Subjective:   Patient: Leslie Wood [id number], :1940   Visit date:2019 8:34 AM    Chief Complaint:  Other (hearing loss and wax impaction)    HPI:  Leslie is a 79 y.o. female who I was asked to see in consultation for evaluation of the following issue(s):    Hearing Loss:    She describes a right greater than left sided hearing loss starting recently and has been unchanged.      The patient reports the following risk factors for hearing loss (Negative unless checked off):   [] Familial deafness   [] Ototoxic medication exposure  [] Acoustic trauma  []  Occupational exposure  [] Head injury or trauma  [] Otologic infection  [] History of meningitis  [] Ear surgery (other than pediatric tympanostomy tubes)  [] Metabolic disease      Severity: moderate  Quality: muffled  Modifying factors:  None  Associated symptoms include   [] Vertigo  [x] Tinnitus -AU  [] Otalgia  [] Drainage or pain   Previous treatments include none.    Hx of asymmetric SNHL AD -discovered on audiogram from 2018 at [facility] (unable to see this on pt's records). Pt underwent CT which was insignificant, was unable to complete Mri at the time due to her spinal cord stimulator. She has since had her stimulator switched to an MRI compatible one.   No otalgia  She currently takes Melatonin 10 mg QHS    Tinnitus:  Patient presents with tinnitus. Onset of symptoms was gradual starting a few months ago ago with unchanged course since that time. Patient describes the tinnitus as constant located in the bilateral ear. The quality is described as high pitch. The pattern is nonpulsatile with an intensity that is soft. Patient describes her level of annoyance as minimally annoying, intermittently aware. Associated symptoms include hearing loss Previous treatments include none.        Review of Systems:  Negative unless checked off.  Gen:  " []fever   []fatigue  HENT:  []nosebleeds  []dental problem   Eyes:  []photophobia  []visual disturbance  Resp:  []chest tightness []wheezing  Card:  []chest pain  []leg swelling  GI:  []abdominal pain []blood in stool  :  []dysuria  []hematuria  Musc:  []joint swelling  []gait problem  Skin:  []color change  []pallor  Neuro:  []seizures  []numbness  Hem:  []bruise/bleed easily  Psych:  []hallucinations  []behavioral problems  Allergy/Imm: is allergic to amitriptyline; hydrochlorothiazide; lisinopril; percocet [oxycodone-acetaminophen]; and codeine.    Her meds, allergies, medical, surgical, social & family histories were reviewed & updated:  -     She has a current medication list which includes the following prescription(s): amlodipine, hydralazine, hydrocodone-acetaminophen, lactobac no.41/bifidobact no.7, tizanidine, trazodone, and vitamin d.  -     She  has a past medical history of Arthritis, Cataract, GERD (gastroesophageal reflux disease), and Hypertension.   -     She does not have any pertinent problems on file.   -     She  has a past surgical history that includes Back surgery; Eye surgery; Appendectomy; Adrenal gland surgery; Hemorrhoid surgery; Hernia repair; Hysterectomy; Tonsillectomy; and Adenoidectomy.  -     She  reports that she has never smoked. She has never used smokeless tobacco. She reports that she does not drink alcohol or use drugs.  -     Her family history includes Diabetes in her mother; Heart disease in her mother; Hypertension in her father and mother; Kidney disease in her father.  -     She is allergic to amitriptyline; hydrochlorothiazide; lisinopril; percocet [oxycodone-acetaminophen]; and codeine.    Objective:     Physical Exam:  Vitals:  Temp 97.7 °F (36.5 °C) (Tympanic)   Ht 5' 1" (1.549 m)   Wt 70.8 kg (156 lb)   BMI 29.48 kg/m²   General appearance:  Well developed, well nourished    Eyes:  Extraocular motions intact, PERRL    Communication:  no hoarseness, no " dysphonia    Ears:  Right cerumen impaction present, unable to view TM prior to removing.   Nose:  No masses/lesions of external nose, nasal mucosa, septum, and turbinates were within normal limits.  Mouth:  No mass/lesion of lips, teeth, gums, hard/soft palate, tongue, tonsils, or oropharynx.    Cardiovascular:  No pedal edema; Radial Pulses +2     Neck & Lymphatics:  No cervical lymphadenopathy, no neck mass/crepitus/ asymmetry, trachea is midline, no thyroid enlargement/tenderness/mass.    Psych: Oriented x3,  Alert with normal mood and affect.     Respiration/Chest:  Symmetric expansion during respiration, normal respiratory effort.    Skin:  Warm and intact. No ulcerations of face, scalp, neck.    CT HEAD WITHOUT CONTRAST     History:  Headache, post trauma     Technique:  Noncontrast CT of the brain.        Findings:  The ventricles are dilated consistent with generalized atrophy. Associated age-related white matter degeneration is present.     There is soft tissue hematoma superficially in the scalp in the right parietal region. the underlying calvarium appears intact.  The mastoids and paranasal sinuses are clear.      Impression      Mild to moderate generalized age-related atrophy.  A prominent right superficial scalp hematoma is present in the parietal region.         AUDIOGRAM:           Assessment & Plan:   Leslie was seen today for other.    Diagnoses and all orders for this visit:    Impacted cerumen of right ear    Sensorineural hearing loss (SNHL) of both ears        Leslei presents with new problem(s) today. We discussed that hearing loss may be multifactorial requiring additional testing to determine various causes of hearing loss and the degree to which they are contributing to symptoms. The prognosis and progression of hearing loss is uncertain and significantly dependent on the etiology.     -HEARING LOSS-  I spent a considerable amount of time educating the patient on hearing and hearing loss.  We  discussed the basic characteristics of conductive hearing loss versus sensorineural hearing loss and the significant differences in treatment options between the two categories.      We discussed that in cases of conductive hearing loss, this suggests a mechanical disorder that sometimes can be improved with medications &/or surgery.  It may occur secondary to external ear pathology (atresia, otitis externa, etc), tympanic membrane disorders (large perforations, immobility due to scarring or eustachian tube dysfunction), and middle ear disorders (effusions, ossicular disorders).    Sensorineural hearing loss is the expected hearing loss pattern with aging, but some disorders such as Meniere's may accelerate this process.  Additionally, amplification with hearing aids is generally the best option for hearing rehabilitation, except where the hearing loss is profound.  We discussed that this generally does not represent a dangerous condition, but in cases where there is a large discrepancy between the two ears in terms of nerve function, more investigation is often necessary due to the possiblity of vestibular schwannomas or meningiomas at the cerebellopontine angle.  The definitive test for this is an MRI with gadolinium.  However, these masses are usually very slow growing (1-2mm/year), so patients may elect to repeat an audiogram in about 6 months or obtain an ABR so long as they understand that this may result in a delay in diagnosis (although very unlikely that this would have a significant clinical impact on their outcome due to the slow growing nature of these masses) with the understanding that if ABR is abnormal or asymmetry increases, an MRI would then be required.    Leslie  presents with what appears to be sensorineural hearing loss.  Based on this, my recommendation is annual audiograms. Use of Debrox.     Thank you for allowing me to participate in the care of Leslie.      Tiffany Vincent, PA-C [facility]   [facility]  [address]  [place] [address]  P: [phone]  F: [phone]      Patient: Leslie Wood [id number], :1940  Procedure date:2019  Patient's medications, allergies, past medical, surgical, social and family histories were reviewed and updated as appropriate.  Chief Complaint:  Other (hearing loss and wax impaction)    HPI:  Leslie is a 79 y.o. female with the history of present illness as discussed in the clinic note from today.  During this unrelated patient encounter I observed impacted cerumen.  The otoscopic examination of the tympanic membrane was not possible due to copious cerumen impaction.      Procedure: The patient was in agreement with the examination and debridement of the ears. Removal of the cerumen required a high level of expertise and use of an operating microscope and multiple micro-instruments.     With the patient in the supine position, we used the operating microscope to examine both ears with the appropriate sized ear speculum.  A variety of sterile, micro-instruments were utilized to remove the cerumen atraumatically.  I performed the procedure which required a significant amount of time and effort. The tympanic membrane was then well visualized.  The patient tolerated the procedure well and there were no complications.    Findings:   Right ear had significant wax, the EAC was normal, and the tympanic membrane was intact with no evidence of middle ear fluid.    Left ear had no wax, the EAC was normal, and the tympanic membrane was intact with no evidence of middle ear fluid.

## 2019-06-25 NOTE — LETTER
June 25, 2019      Angeles Carson MD  [address] [place] [address] [address] [place] [address]           [facility]  [address] [place] [address] [place] [address]  Phone: [phone]  Fax: [phone]          Patient: Leslie Wood   MR Number: [id number]   YOB: 1940   Date of Visit: 6/25/2019       Dear Dr. Angeles Carson:    Thank you for referring Leslie Wood to me for evaluation. Attached you will find relevant portions of my assessment and plan of care.    If you have questions, please do not hesitate to call me. I look forward to following Leslie Wood along with you.    Sincerely,    Erma Low PA-C    Enclosure  CC:  No Recipients    If you would like to receive this communication electronically, please contact [email or web address] or [phone] to request more information on [employer] Link access.    For providers and/or their staff who would like to refer a patient to [facility], please contact us through our one-stop-shop provider referral line, [facility], at [phone].    If you feel you have received this communication in error or would no longer like to receive these types of communications, please e-mail [email or web address]

## 2019-07-01 ENCOUNTER — NURSE TRIAGE (OUTPATIENT)
Dept: ADMINISTRATIVE | Facility: CLINIC | Age: 79
End: 2019-07-01

## 2019-07-01 DIAGNOSIS — K14.6 TONGUE PAIN: Primary | ICD-10-CM

## 2019-07-02 ENCOUNTER — OFFICE VISIT (OUTPATIENT)
Dept: OTOLARYNGOLOGY | Facility: CLINIC | Age: 79
End: 2019-07-02
Payer: MEDICARE

## 2019-07-02 ENCOUNTER — OFFICE VISIT (OUTPATIENT)
Dept: FAMILY MEDICINE | Facility: CLINIC | Age: 79
End: 2019-07-02
Payer: MEDICARE

## 2019-07-02 VITALS
TEMPERATURE: 99 F | WEIGHT: 151.44 LBS | OXYGEN SATURATION: 95 % | HEIGHT: 61 IN | DIASTOLIC BLOOD PRESSURE: 90 MMHG | SYSTOLIC BLOOD PRESSURE: 191 MMHG | BODY MASS INDEX: 28.59 KG/M2 | HEART RATE: 95 BPM

## 2019-07-02 VITALS
TEMPERATURE: 99 F | HEIGHT: 61 IN | WEIGHT: 151.69 LBS | DIASTOLIC BLOOD PRESSURE: 78 MMHG | BODY MASS INDEX: 28.64 KG/M2 | SYSTOLIC BLOOD PRESSURE: 165 MMHG | HEART RATE: 86 BPM

## 2019-07-02 DIAGNOSIS — I10 ESSENTIAL HYPERTENSION: ICD-10-CM

## 2019-07-02 DIAGNOSIS — K11.20 PAROTIDITIS: Primary | ICD-10-CM

## 2019-07-02 DIAGNOSIS — R25.2 TRISMUS: ICD-10-CM

## 2019-07-02 DIAGNOSIS — R68.84 PAIN IN UPPER JAW: Primary | ICD-10-CM

## 2019-07-02 PROCEDURE — 99214 OFFICE O/P EST MOD 30 MIN: CPT | Mod: S$PBB,,, | Performed by: PHYSICIAN ASSISTANT

## 2019-07-02 PROCEDURE — 99999 PR PBB SHADOW E&M-EST. PATIENT-LVL III: ICD-10-PCS | Mod: PBBFAC,,, | Performed by: FAMILY MEDICINE

## 2019-07-02 PROCEDURE — 99213 OFFICE O/P EST LOW 20 MIN: CPT | Mod: PBBFAC,27 | Performed by: PHYSICIAN ASSISTANT

## 2019-07-02 PROCEDURE — 99213 OFFICE O/P EST LOW 20 MIN: CPT | Mod: PBBFAC,PO | Performed by: FAMILY MEDICINE

## 2019-07-02 PROCEDURE — 99999 PR PBB SHADOW E&M-EST. PATIENT-LVL III: CPT | Mod: PBBFAC,,, | Performed by: FAMILY MEDICINE

## 2019-07-02 PROCEDURE — 99214 PR OFFICE/OUTPT VISIT, EST, LEVL IV, 30-39 MIN: ICD-10-PCS | Mod: S$PBB,,, | Performed by: FAMILY MEDICINE

## 2019-07-02 PROCEDURE — 99999 PR PBB SHADOW E&M-EST. PATIENT-LVL III: CPT | Mod: PBBFAC,,, | Performed by: PHYSICIAN ASSISTANT

## 2019-07-02 PROCEDURE — 99999 PR PBB SHADOW E&M-EST. PATIENT-LVL III: ICD-10-PCS | Mod: PBBFAC,,, | Performed by: PHYSICIAN ASSISTANT

## 2019-07-02 PROCEDURE — 99214 OFFICE O/P EST MOD 30 MIN: CPT | Mod: S$PBB,,, | Performed by: FAMILY MEDICINE

## 2019-07-02 PROCEDURE — 99214 PR OFFICE/OUTPT VISIT, EST, LEVL IV, 30-39 MIN: ICD-10-PCS | Mod: S$PBB,,, | Performed by: PHYSICIAN ASSISTANT

## 2019-07-02 RX ORDER — AMOXICILLIN AND CLAVULANATE POTASSIUM 400; 57 MG/5ML; MG/5ML
20 POWDER, FOR SUSPENSION ORAL 2 TIMES DAILY
Qty: 344 ML | Refills: 0 | Status: SHIPPED | OUTPATIENT
Start: 2019-07-02 | End: 2019-07-12

## 2019-07-02 RX ORDER — PREDNISONE 5 MG/ML
SOLUTION ORAL
Qty: 215 ML | Refills: 0 | Status: SHIPPED | OUTPATIENT
Start: 2019-07-02 | End: 2019-07-22

## 2019-07-02 NOTE — LETTER
July 2, 2019      Angeles Carson MD  [address] [place] [address] Dr [place] [address]           [facility]  [address] [place] [address] [place] [address]  Phone: [phone]  Fax: [phone]          Patient: Leslie Wood   MR Number: [id number]   YOB: 1940   Date of Visit: 7/2/2019       Dear Dr. Angeles Carson:    Thank you for referring Leslie Wood to me for evaluation. Attached you will find relevant portions of my assessment and plan of care.    If you have questions, please do not hesitate to call me. I look forward to following Leslie Wood along with you.    Sincerely,    Erma Low PA-C    Enclosure  CC:  No Recipients    If you would like to receive this communication electronically, please contact [email or web address] or [phone] to request more information on [employer] Link access.    For providers and/or their staff who would like to refer a patient to [facility], please contact us through our one-stop-shop provider referral line, [facility], at [phone].    If you feel you have received this communication in error or would no longer like to receive these types of communications, please e-mail [email or web address]

## 2019-07-02 NOTE — TELEPHONE ENCOUNTER
Reason for Disposition   Patient sounds very sick or weak to the triager    Protocols used: TOOTHACHE-A-AH    Patient tongue hurts and can barely move it. Can't open mouth very wide. Severe pain.

## 2019-07-02 NOTE — PROGRESS NOTES
Subjective:   Patient: Leslie Wood [id number], :1940   Visit date:2019 1:30 PM    Chief Complaint:  Dysphagia (pt states it hard to swallow, left side of face swollen )    HPI:  Leslie is a 79 y.o. female who is here for evaluation of tongue pain/swelling and left sided facial pain and swelling x 1 day. She was seen by her dentist who issued Clindamycin capsule and a Medrol dosepak, however, pt has not been able to start due to not being capable of swallowing these. She c/o difficulty with swallowing due to tongue pain and swelling. Pain is dull. She denied throat pain unless eating/swallowing. She does have a hx of TMJ but stated this pain and swelling is different and she has not had a flare up in TMJ in several years. Denied otalgia. No fevers or other cold symptoms. No relieving factors. She has never smoked.       Review of Systems:  -     Allergic/Immunologic: is allergic to amitriptyline; hydrochlorothiazide; lisinopril; percocet [oxycodone-acetaminophen]; and codeine..  -     Constitutional: Current temp: 99.2 °F (37.3 °C) (Tympanic)    Her meds, allergies, medical, surgical, social & family histories were reviewed & updated:  -     She has a current medication list which includes the following prescription(s): amlodipine, hydralazine, hydrocodone-acetaminophen, lactobac no.41/bifidobact no.7, tizanidine, trazodone, vitamin d, amoxicillin-clavulanate, and prednisone 5 mg/5 ml.  -     She  has a past medical history of Arthritis, Cataract, GERD (gastroesophageal reflux disease), and Hypertension.   -     She does not have any pertinent problems on file.   -     She  has a past surgical history that includes Back surgery; Eye surgery; Appendectomy; Adrenal gland surgery; Hemorrhoid surgery; Hernia repair; Hysterectomy; Tonsillectomy; and Adenoidectomy.  -     She  reports that she has never smoked. She has never used smokeless tobacco. She reports that she does not drink alcohol or use drugs.  -      "Her family history includes Diabetes in her mother; Heart disease in her mother; Hypertension in her father and mother; Kidney disease in her father.  -     She is allergic to amitriptyline; hydrochlorothiazide; lisinopril; percocet [oxycodone-acetaminophen]; and codeine.    Objective:     Physical Exam:  Vitals:  BP (!) 165/78 (BP Location: Left arm, Patient Position: Sitting, BP Method: Small (Automatic))   Pulse 86   Temp 99.2 °F (37.3 °C) (Tympanic)   Ht 5' 1" (1.549 m)   Wt 68.8 kg (151 lb 10.8 oz)   BMI 28.66 kg/m²   Appearance:  Well-developed, well-nourished. Mild distress   Communication:  Able to communicate, no hoarseness.  Head & Face:  Normocephalic, atraumatic, no sinus tenderness, normal facial strength.  Eyes:  Extraocular motions intact.  Ears:  Otoscopy of external auditory canals and tympanic membranes was normal, clinical speech reception thresholds grossly intact, no mass/lesion of auricle.  Nose:  No masses/lesions of external nose, nasal mucosa, septum, and turbinates were within normal limits.  Mouth:  +swelling along left parotid with tenderness, no mass, no erythema, no warmth. Mild swelling of tongue appreciated, no oral mass or lesion. +trismus  Neck & Lymphatics:  No cervical lymphadenopathy, no neck mass/crepitus/ asymmetry, trachea is midline, no thyroid enlargement/tenderness/mass.  Neuro/Psych: Alert with normal mood and affect.   Abdominal: Normal appearance.   Respiration/Chest:  Symmetric expansion during respiration, normal respiratory effort.  Skin:  Warm and intact  Cardiovascular:  No peripheral vascular edema or varicosities.    Assessment & Plan:   Leslie was seen today for dysphagia.    Diagnoses and all orders for this visit:    Parotiditis    Trismus    Other orders  -     amoxicillin-clavulanate (AUGMENTIN) 400-57 mg/5 mL SusR; Take 17.2 mLs (1,376 mg total) by mouth 2 (two) times daily. for 10 days  -     predniSONE 5 mg/5 ml 5 mg/5 mL Soln; Take 10 mL by mouth 3 " times daily x 3 days. Then 10 mL twice daily x 3 days. Then 10 mL daily x 3 days. Then 5 mL daily x 3 days.      Parotiditis is commonly caused by Staph aureus or may be polymicrobial.  Dehydration, especially in immunocompromised patients (such as diabetics), is frequently an inciting factor.  In the absence of an abscess, there is no role for surgical intervention.  I recommend hydration and antibiotics.  Additionally treatment with warm compresses and sialogogues such as lemon wedges may assist in restoring drainage of the parotid gland an provide symptomatic relief.    Pt unable to swallow any tablets or capsules.   Issued Augmentin soln x 10 days as well as a Prednisone taper soln.   Will recheck in 1 week. Advised pt to contact us if symptoms are not improving, change, or worsen before this Friday. She and her grandson voiced agreeing.     Over 25 minutes were spent in face to face contact with the patient with greater than 50% spent discussing their diagnosis and coordination of care.         Erma Low PA-C  [facility]   [facility]  [address]  [place] [address]  P: [phone]  F: [phone]

## 2019-07-02 NOTE — PROGRESS NOTES
Subjective:       Patient ID: Leslie Wood is a 79 y.o. female.    Chief Complaint: No chief complaint on file.      HPI Comments:       Current Outpatient Medications:     amLODIPine (NORVASC) 5 MG tablet, Take 1 tablet (5 mg total) by mouth once daily., Disp: 90 tablet, Rfl: 1    hydrALAZINE (APRESOLINE) 25 MG tablet, Take 1 tablet (25 mg total) by mouth every 12 (twelve) hours., Disp: 180 tablet, Rfl: 1    hydrocodone-acetaminophen 10-325mg (NORCO)  mg Tab, , Disp: , Rfl:     Lactobac no.41/Bifidobact no.7 (PROBIOTIC-10 ORAL), Take by mouth., Disp: , Rfl:     tizanidine (ZANAFLEX) 4 MG tablet, Take 1 tablet (4 mg total) by mouth every 6 (six) hours as needed (HOLD while on CIPRO)., Disp: , Rfl:     traZODone (DESYREL) 50 MG tablet, Take 1 tablet (50 mg total) by mouth nightly as needed for Insomnia., Disp: 90 tablet, Rfl: 1    vitamin D (VITAMIN D3) 1000 units Tab, Take 1,000 Units by mouth., Disp: , Rfl:       This is my 1st time seeing this patient.  She has history prediabetes and hypertension.    Starting about 3 days ago she began having a sharp pain in her left jaw.  Pain was in front of the ear and radiated sharply up around the urine up to the head on that side.  Increasingly she had difficulty opening her jaw.  Was has not been able to swallow pills or solids since then.  She is able to drink fluids, it hurts sometimes to swallow.    Today she went to a dentist.  Who tried to refer her to an ENT in [facility].  Later the  rerouted her to ENT in , however she does not yet have an appointment.  The dentist tried to take x-rays but was largely unsuccessful because it was difficult for her to open her mouth far enough.  Discharged her on clindamycin and Medrol Dosepak, which she has not yet started.    She had some chills off and on during these.  But no documented fever.  She has never had any trouble like this before or sialadenitis    Review of Systems   Constitutional: Negative for  "activity change, appetite change and fever.   HENT: Positive for sore throat and trouble swallowing.    Respiratory: Negative for cough and shortness of breath.    Cardiovascular: Negative for chest pain.   Gastrointestinal: Negative for abdominal pain, diarrhea and nausea.   Genitourinary: Negative for difficulty urinating.   Musculoskeletal: Negative for arthralgias and myalgias.   Neurological: Negative for dizziness and headaches.       Objective:      Vitals:    07/02/19 1130   BP: (!) 191/90   Pulse: 95   Temp: 99.1 °F (37.3 °C)   SpO2: 95%   Weight: 68.7 kg (151 lb 7.3 oz)   Height: 5' 1" (1.549 m)   PainSc: 10-Worst pain ever     Physical Exam   Constitutional: She is oriented to person, place, and time. She appears well-developed and well-nourished. She does not appear ill. No distress.   She is not toxic-appearing   HENT:   Head: Normocephalic.       Right Ear: Tympanic membrane, external ear and ear canal normal. No tenderness.   Left Ear: Tympanic membrane, external ear and ear canal normal. No tenderness.   Mouth/Throat: Mucous membranes are normal. No oropharyngeal exudate, posterior oropharyngeal edema, posterior oropharyngeal erythema or tonsillar abscesses.   Neck: Neck supple. No thyromegaly present.   Cardiovascular: Normal rate, regular rhythm and normal heart sounds.   No murmur heard.  Pulmonary/Chest: Effort normal and breath sounds normal. She has no wheezes. She has no rales.   Abdominal: Soft. She exhibits no distension.   Musculoskeletal: She exhibits no edema.   Lymphadenopathy:     She has no cervical adenopathy.   Neurological: She is alert and oriented to person, place, and time.   Skin: Skin is warm and dry. She is not diaphoretic.   Psychiatric: She has a normal mood and affect. Her behavior is normal. Judgment and thought content normal.   Nursing note and vitals reviewed.      Assessment:       1. Pain in upper jaw    2. Essential hypertension        Plan:   Pain in upper " jaw  Comments:  Exam suggest sialadenitis.  Concerned however about the inability to open her jaw fully and swallow pills and solids.  ENT evaluation today.    Essential hypertension  Comments:  Elevated now.  Follow up with PCP

## 2019-07-05 NOTE — PROGRESS NOTES
"Subjective:   Patient: Leslie Wood [id number], :1940   Visit date:2019 4:26 PM    Chief Complaint:  Other (follow up)    HPI:  Leslie is a 79 y.o. female who is here for follow-up. She was last here on 19 for L parotiditis tx with Augmentin and a Prednisone taper. She rtc on 19 and reported "feeling 1,000 x better". She reported complete relief in swelling and pain. She has completed prednisone, has 2 days left of abx. She is very happy with the relief in symptoms. No new ssx. No other complaints.       Review of Systems:  -     Allergic/Immunologic: is allergic to amitriptyline; hydrochlorothiazide; lisinopril; percocet [oxycodone-acetaminophen]; and codeine..  -     Constitutional: Current temp: 98.4 °F (36.9 °C) (Tympanic)    Her meds, allergies, medical, surgical, social & family histories were reviewed & updated:  -     She has a current medication list which includes the following prescription(s): amlodipine, amoxicillin-clavulanate, hydralazine, hydrocodone-acetaminophen, lactobac no.41/bifidobact no.7, prednisone 5 mg/5 ml, tizanidine, trazodone, vitamin d, and prednisolone.  -     She  has a past medical history of Arthritis, Cataract, GERD (gastroesophageal reflux disease), and Hypertension.   -     She does not have any pertinent problems on file.   -     She  has a past surgical history that includes Back surgery; Eye surgery; Appendectomy; Adrenal gland surgery; Hemorrhoid surgery; Hernia repair; Hysterectomy; Tonsillectomy; and Adenoidectomy.  -     She  reports that she has never smoked. She has never used smokeless tobacco. She reports that she does not drink alcohol or use drugs.  -     Her family history includes Diabetes in her mother; Heart disease in her mother; Hypertension in her father and mother; Kidney disease in her father.  -     She is allergic to amitriptyline; hydrochlorothiazide; lisinopril; percocet [oxycodone-acetaminophen]; and codeine.    Objective: " "    Physical Exam:  Vitals:  Temp 98.4 °F (36.9 °C) (Tympanic)   Ht 5' 1" (1.549 m)   Wt 68.5 kg (151 lb)   BMI 28.53 kg/m²   Appearance:  Well-developed, well-nourished.  Communication:  Able to communicate, no hoarseness.  Head & Face:  Normocephalic, atraumatic, no sinus tenderness, normal facial strength.  Eyes:  Extraocular motions intact.  Ears:  Otoscopy of external auditory canals and tympanic membranes was normal, clinical speech reception thresholds grossly intact, no mass/lesion of auricle.  Nose:  No masses/lesions of external nose, nasal mucosa, septum, and turbinates were within normal limits.  Mouth:  No mass/lesion of lips, teeth, gums, hard/soft palate, tongue, tonsils, or oropharynx.  Neck & Lymphatics:  No cervical lymphadenopathy, no neck mass/crepitus/ asymmetry, trachea is midline, no thyroid enlargement/tenderness/mass.  Neuro/Psych: Alert with normal mood and affect.   Abdominal: Normal appearance.   Respiration/Chest:  Symmetric expansion during respiration, normal respiratory effort.  Skin:  Warm and intact  Cardiovascular:  No peripheral vascular edema or varicosities.    Assessment & Plan:   Leslie was seen today for other.    Diagnoses and all orders for this visit:    Parotiditis    Resolved, advised completing abx.   RTC PRN.        Erma Low PA-C  [facility]   [facility]  [address]  [place] [address]  P: [phone]  F: [phone]      "

## 2019-07-08 ENCOUNTER — CLINICAL SUPPORT (OUTPATIENT)
Dept: INTERNAL MEDICINE | Facility: CLINIC | Age: 79
End: 2019-07-08
Payer: MEDICARE

## 2019-07-08 PROCEDURE — 99211 OFF/OP EST MAY X REQ PHY/QHP: CPT | Mod: PBBFAC

## 2019-07-08 PROCEDURE — 99999 PR PBB SHADOW E&M-EST. PATIENT-LVL I: ICD-10-PCS | Mod: PBBFAC,,,

## 2019-07-08 PROCEDURE — 99999 PR PBB SHADOW E&M-EST. PATIENT-LVL I: CPT | Mod: PBBFAC,,,

## 2019-07-08 NOTE — PROGRESS NOTES
Patient in today for recheck of blood pressure due to recent elevated reading at office visit. Blood pressure is 154/68 and pulse is 70. Patient is complaining of back pain today. Rates the pain an 8 on the pain scale.  notified of readings ( out of office) and  spoke with the patient and advised to increase the Amlodipine 5 mg to twice daily and to follow up in 2 weeks with . Follow up appointment has been scheduled and patient advised to call the office as needed, patient verbalized understanding.

## 2019-07-08 NOTE — PROGRESS NOTES
Subjective:       Patient ID: Leslie Wood is a 79 y.o. female.    Chief Complaint: No chief complaint on file.    HPI  Review of Systems    Objective:      Physical Exam    Assessment:       No diagnosis found.    Plan:   There are no diagnoses linked to this encounter.    Nursing visit  elevated pressure  Advised double amlodipine and f/u with PCP within 1-2 weeks  No follow-ups on file.

## 2019-07-09 ENCOUNTER — OFFICE VISIT (OUTPATIENT)
Dept: OTOLARYNGOLOGY | Facility: CLINIC | Age: 79
End: 2019-07-09
Payer: MEDICARE

## 2019-07-09 VITALS — HEIGHT: 61 IN | BODY MASS INDEX: 28.51 KG/M2 | WEIGHT: 151 LBS | TEMPERATURE: 98 F

## 2019-07-09 DIAGNOSIS — K11.20 PAROTIDITIS: Primary | ICD-10-CM

## 2019-07-09 PROCEDURE — 99999 PR PBB SHADOW E&M-EST. PATIENT-LVL III: ICD-10-PCS | Mod: PBBFAC,,, | Performed by: PHYSICIAN ASSISTANT

## 2019-07-09 PROCEDURE — 99213 OFFICE O/P EST LOW 20 MIN: CPT | Mod: S$PBB,,, | Performed by: PHYSICIAN ASSISTANT

## 2019-07-09 PROCEDURE — 99213 PR OFFICE/OUTPT VISIT, EST, LEVL III, 20-29 MIN: ICD-10-PCS | Mod: S$PBB,,, | Performed by: PHYSICIAN ASSISTANT

## 2019-07-09 PROCEDURE — 99999 PR PBB SHADOW E&M-EST. PATIENT-LVL III: CPT | Mod: PBBFAC,,, | Performed by: PHYSICIAN ASSISTANT

## 2019-07-09 PROCEDURE — 99213 OFFICE O/P EST LOW 20 MIN: CPT | Mod: PBBFAC | Performed by: PHYSICIAN ASSISTANT

## 2019-07-09 RX ORDER — PREDNISOLONE SODIUM PHOSPHATE 15 MG/5ML
SOLUTION ORAL
COMMUNITY
Start: 2019-07-02 | End: 2019-07-22

## 2019-07-11 ENCOUNTER — TELEPHONE (OUTPATIENT)
Dept: OTOLARYNGOLOGY | Facility: CLINIC | Age: 79
End: 2019-07-11

## 2019-07-11 NOTE — TELEPHONE ENCOUNTER
Attempted to return pts call no answer and could not leave message d/t the mailbox being full.        ----- Message from Nohemi Weldon sent at 7/11/2019  9:11 AM CDT -----  Contact: Patient  Patient would like a call back  because the treatment isn't working and the infection is getting worse. -[phone].

## 2019-07-12 ENCOUNTER — TELEPHONE (OUTPATIENT)
Dept: OTOLARYNGOLOGY | Facility: CLINIC | Age: 79
End: 2019-07-12

## 2019-07-12 NOTE — TELEPHONE ENCOUNTER
----- Message from Ekaterina Barnes sent at 7/12/2019  8:31 AM CDT -----  Contact: self/[phone]  Type:  Patient Returning Call    Who Called:Leslie Wood    Who Left Message for Patient:nurse  Does the patient know what this is regarding?:throat pain  Would the patient rather a call back or a response via [facility]? Call back   Best Call Back Number:[phone]  Additional Information:

## 2019-07-17 ENCOUNTER — TELEPHONE (OUTPATIENT)
Dept: INTERNAL MEDICINE | Facility: CLINIC | Age: 79
End: 2019-07-17

## 2019-07-17 NOTE — TELEPHONE ENCOUNTER
----- Message from Jade Yury sent at 7/17/2019  9:57 AM CDT -----  Type:  Needs Medical Advice    Who Called:  Michel Nelson  Symptoms (please be specific):   Elevated blood pressure   How long has patient had these symptoms:    3-4 days  Pharmacy name and phone #:     Would the patient rather a call back or a response via [facility]?  Call back  Best Call Back Number:   [phone]  Additional Information:  States she would like for your office to call her//please call//javier/temitope

## 2019-07-22 ENCOUNTER — OFFICE VISIT (OUTPATIENT)
Dept: INTERNAL MEDICINE | Facility: CLINIC | Age: 79
End: 2019-07-22
Payer: MEDICARE

## 2019-07-22 VITALS
OXYGEN SATURATION: 97 % | DIASTOLIC BLOOD PRESSURE: 82 MMHG | WEIGHT: 155 LBS | RESPIRATION RATE: 20 BRPM | HEART RATE: 86 BPM | BODY MASS INDEX: 29.27 KG/M2 | SYSTOLIC BLOOD PRESSURE: 136 MMHG | TEMPERATURE: 97 F | HEIGHT: 61 IN

## 2019-07-22 DIAGNOSIS — I10 ESSENTIAL HYPERTENSION: ICD-10-CM

## 2019-07-22 DIAGNOSIS — Z78.9 STATIN INTOLERANCE: ICD-10-CM

## 2019-07-22 PROBLEM — M54.9 CHRONIC BACK PAIN: Status: ACTIVE | Noted: 2019-07-22

## 2019-07-22 PROBLEM — G89.29 CHRONIC BACK PAIN: Status: ACTIVE | Noted: 2019-07-22

## 2019-07-22 PROBLEM — M85.89 OSTEOPENIA OF MULTIPLE SITES: Status: ACTIVE | Noted: 2018-07-30

## 2019-07-22 PROBLEM — E78.5 HYPERLIPIDEMIA LDL GOAL <100: Status: ACTIVE | Noted: 2018-07-30

## 2019-07-22 PROCEDURE — 99213 OFFICE O/P EST LOW 20 MIN: CPT | Mod: S$PBB,,, | Performed by: FAMILY MEDICINE

## 2019-07-22 PROCEDURE — 99999 PR PBB SHADOW E&M-EST. PATIENT-LVL III: CPT | Mod: PBBFAC,,, | Performed by: FAMILY MEDICINE

## 2019-07-22 PROCEDURE — 99213 PR OFFICE/OUTPT VISIT, EST, LEVL III, 20-29 MIN: ICD-10-PCS | Mod: S$PBB,,, | Performed by: FAMILY MEDICINE

## 2019-07-22 PROCEDURE — 99213 OFFICE O/P EST LOW 20 MIN: CPT | Mod: PBBFAC | Performed by: FAMILY MEDICINE

## 2019-07-22 PROCEDURE — 99999 PR PBB SHADOW E&M-EST. PATIENT-LVL III: ICD-10-PCS | Mod: PBBFAC,,, | Performed by: FAMILY MEDICINE

## 2019-07-22 RX ORDER — AMLODIPINE BESYLATE 10 MG/1
10 TABLET ORAL DAILY
Qty: 90 TABLET | Refills: 1 | Status: SHIPPED | OUTPATIENT
Start: 2019-07-22 | End: 2019-08-08 | Stop reason: SDUPTHER

## 2019-07-22 NOTE — PROGRESS NOTES
Leslie Wood  79 y.o. White female    Here for follow up on hypertension.    Sunday before this past Sunday she has 3 episodes of syncope.   She did not know at the time what happened.   She was at [employer].   [employer]  Went to car in between Sunday school and service, she passed out in the car. Someone came to check on her   In the service she was out/head down twice. No one noticed.   She feels her blood pressure dropped at this time.     Checking blood pressure at home she has been having high pressures she felt it was elevated because of headaches.   When HAs resolved it was lower.     Last appt she had elevated pressure and her amlodipine was increased to 10 mg from 5 mg.   Hydralazine she takes in the morning and evening.   She is having stress at work.     Reports taking medications as instructed.  Not taking any OTC meds.    Past Medical History:   Diagnosis Date    Arthritis     Cataract     GERD (gastroesophageal reflux disease)     Hypertension          Current Outpatient Medications:     amLODIPine (NORVASC) 5 MG tablet, Take 1 tablet (5 mg total) by mouth once daily. (Patient taking differently: Take 10 mg by mouth once daily. ), Disp: 90 tablet, Rfl: 1    hydrALAZINE (APRESOLINE) 25 MG tablet, Take 1 tablet (25 mg total) by mouth every 12 (twelve) hours., Disp: 180 tablet, Rfl: 1    Lactobac no.41/Bifidobact no.7 (PROBIOTIC-10 ORAL), Take by mouth., Disp: , Rfl:     tizanidine (ZANAFLEX) 4 MG tablet, Take 1 tablet (4 mg total) by mouth every 6 (six) hours as needed (HOLD while on CIPRO)., Disp: , Rfl:     traZODone (DESYREL) 50 MG tablet, Take 1 tablet (50 mg total) by mouth nightly as needed for Insomnia., Disp: 90 tablet, Rfl: 1    vitamin D (VITAMIN D3) 1000 units Tab, Take 1,000 Units by mouth., Disp: , Rfl:     hydrocodone-acetaminophen 10-325mg (NORCO)  mg Tab, , Disp: , Rfl:     Review of patient's allergies indicates:   Allergen Reactions    Amitriptyline      "Hydrochlorothiazide      Causes muscle cramping    Lisinopril      hyperkalemia    Percocet [oxycodone-acetaminophen] Other (See Comments)     Seizures    Codeine Nausea Only and Rash       ROS: Denies dizziness, headache, chest pain, shortness of breath or edema    PE:  GEN: Alert and oriented, no acute distress  HEART: Normal S1 and S2, RRR, no lower extremity edema  LUNGS: Respirations unlabored, bilaterally clear to auscultation    ASSESSMENT/PLAN:    Essential hypertension  -     amLODIPine (NORVASC) 10 MG tablet; Take 1 tablet (10 mg total) by mouth once daily.  Dispense: 90 tablet; Refill: 1  Blood pressure normal today  Increased amlodipine at previous visit.   Will not make any changes to medication today.  Will bring BP cuff in 2 days to recheck pressure    Statin intolerance  Notes she was put on a statin years ago and states, "it almost killed me" she reports she was unable to walk      Follow up NV in 2 days   Will decide at that time if any changes needed to be made  "

## 2019-07-25 ENCOUNTER — OFFICE VISIT (OUTPATIENT)
Dept: OTOLARYNGOLOGY | Facility: CLINIC | Age: 79
End: 2019-07-25
Payer: MEDICARE

## 2019-07-25 ENCOUNTER — CLINICAL SUPPORT (OUTPATIENT)
Dept: INTERNAL MEDICINE | Facility: CLINIC | Age: 79
End: 2019-07-25
Payer: MEDICARE

## 2019-07-25 VITALS
WEIGHT: 155.19 LBS | DIASTOLIC BLOOD PRESSURE: 81 MMHG | TEMPERATURE: 99 F | BODY MASS INDEX: 29.33 KG/M2 | SYSTOLIC BLOOD PRESSURE: 155 MMHG | HEART RATE: 90 BPM

## 2019-07-25 VITALS — SYSTOLIC BLOOD PRESSURE: 150 MMHG | DIASTOLIC BLOOD PRESSURE: 84 MMHG | OXYGEN SATURATION: 97 % | HEART RATE: 75 BPM

## 2019-07-25 DIAGNOSIS — H66.91 RIGHT OTITIS MEDIA, UNSPECIFIED OTITIS MEDIA TYPE: Primary | ICD-10-CM

## 2019-07-25 DIAGNOSIS — H60.501 ACUTE OTITIS EXTERNA OF RIGHT EAR, UNSPECIFIED TYPE: ICD-10-CM

## 2019-07-25 PROCEDURE — 99211 OFF/OP EST MAY X REQ PHY/QHP: CPT | Mod: PBBFAC

## 2019-07-25 PROCEDURE — 99999 PR PBB SHADOW E&M-EST. PATIENT-LVL III: CPT | Mod: PBBFAC,,, | Performed by: PHYSICIAN ASSISTANT

## 2019-07-25 PROCEDURE — 99213 OFFICE O/P EST LOW 20 MIN: CPT | Mod: S$PBB,,, | Performed by: PHYSICIAN ASSISTANT

## 2019-07-25 PROCEDURE — 99213 PR OFFICE/OUTPT VISIT, EST, LEVL III, 20-29 MIN: ICD-10-PCS | Mod: S$PBB,,, | Performed by: PHYSICIAN ASSISTANT

## 2019-07-25 PROCEDURE — 99999 PR PBB SHADOW E&M-EST. PATIENT-LVL I: CPT | Mod: PBBFAC,,,

## 2019-07-25 PROCEDURE — 99999 PR PBB SHADOW E&M-EST. PATIENT-LVL I: ICD-10-PCS | Mod: PBBFAC,,,

## 2019-07-25 PROCEDURE — 99999 PR PBB SHADOW E&M-EST. PATIENT-LVL III: ICD-10-PCS | Mod: PBBFAC,,, | Performed by: PHYSICIAN ASSISTANT

## 2019-07-25 PROCEDURE — 99213 OFFICE O/P EST LOW 20 MIN: CPT | Mod: PBBFAC,27 | Performed by: PHYSICIAN ASSISTANT

## 2019-07-25 RX ORDER — AMOXICILLIN AND CLAVULANATE POTASSIUM 875; 125 MG/1; MG/1
1 TABLET, FILM COATED ORAL EVERY 12 HOURS
Qty: 20 TABLET | Refills: 0 | Status: SHIPPED | OUTPATIENT
Start: 2019-07-25 | End: 2019-08-04

## 2019-07-25 RX ORDER — METHYLPREDNISOLONE 4 MG/1
TABLET ORAL
Qty: 1 PACKAGE | Refills: 0 | Status: SHIPPED | OUTPATIENT
Start: 2019-07-25 | End: 2019-08-06 | Stop reason: ALTCHOICE

## 2019-07-25 RX ORDER — OFLOXACIN 3 MG/ML
SOLUTION/ DROPS OPHTHALMIC
Qty: 1 BOTTLE | Refills: 0 | Status: SHIPPED | OUTPATIENT
Start: 2019-07-25 | End: 2019-08-06 | Stop reason: ALTCHOICE

## 2019-07-25 NOTE — PROGRESS NOTES
"Subjective:   Patient: Leslie Wood [id number], :1940   Visit date:2019 12:41 PM    Chief Complaint:  Otalgia ("feel like lighting hitting in right ear") and Sore Throat (pain in throat started last night on right side)    HPI:  Leslie is a 79 y.o. female who is here for new onset of otalgia AD and throat pain on the same side. Pt stated pain in her right ear is severe, throbbing, shoots up the right side of her face/scalp and into her throat. She has not been in water/swimming. She did use a q-tip to clean her ear this AM. Mild HL AD. No otorrhea. No fevers. No parotid swelling. No relieving factors.       Review of Systems:  -     Allergic/Immunologic: is allergic to amitriptyline; hydrochlorothiazide; lisinopril; percocet [oxycodone-acetaminophen]; and codeine..  -     Constitutional: Current temp: 99.2 °F (37.3 °C) (Tympanic)    Her meds, allergies, medical, surgical, social & family histories were reviewed & updated:  -     She has a current medication list which includes the following prescription(s): amlodipine, hydralazine, hydrocodone-acetaminophen, lactobac no.41/bifidobact no.7, tizanidine, trazodone, vitamin d, amoxicillin-clavulanate 875-125mg, methylprednisolone, and ofloxacin.  -     She  has a past medical history of Arthritis, Cataract, GERD (gastroesophageal reflux disease), and Hypertension.   -     She does not have any pertinent problems on file.   -     She  has a past surgical history that includes Back surgery; Eye surgery; Appendectomy; Adrenal gland surgery; Hemorrhoid surgery; Hernia repair; Hysterectomy; Tonsillectomy; and Adenoidectomy.  -     She  reports that she has never smoked. She has never used smokeless tobacco. She reports that she does not drink alcohol or use drugs.  -     Her family history includes Diabetes in her mother; Heart disease in her mother; Hypertension in her father and mother; Kidney disease in her father.  -     She is allergic to amitriptyline; " hydrochlorothiazide; lisinopril; percocet [oxycodone-acetaminophen]; and codeine.    Objective:     Physical Exam:  Vitals:  BP (!) 155/81   Pulse 90   Temp 99.2 °F (37.3 °C) (Tympanic)   Wt 70.4 kg (155 lb 3.3 oz)   BMI 29.33 kg/m²   Communication:  Able to communicate, no hoarseness.  Head & Face:  Normocephalic, atraumatic, no sinus tenderness.  Eyes:  Extraocular motions intact.  Ears: R TM is with moderate bulge, opaque, central erythema, intact. R EAC is with edema, erythema, moist. L Tm and EAC WNL.   Nose:  No masses/lesions of external nose, nasal mucosa, septum, and turbinates were within normal limits.  Mouth:  No mass/lesion of lips, teeth, gums, hard/soft palate, tongue, tonsils, or oropharynx.  Neck & Lymphatics:  No cervical lymphadenopathy, no neck mass/crepitus/ asymmetry, trachea is midline, no thyroid enlargement/tenderness/mass.  Neuro/Psych: Alert with normal mood and affect.   Respiration/Chest:  Symmetric expansion during respiration, normal respiratory effort.  Skin:  Warm and intact.    Assessment & Plan:   Leslie was seen today for otalgia and sore throat.    Diagnoses and all orders for this visit:    Right otitis media, unspecified otitis media type    Acute otitis externa of right ear, unspecified type    Other orders  -     amoxicillin-clavulanate 875-125mg (AUGMENTIN) 875-125 mg per tablet; Take 1 tablet by mouth every 12 (twelve) hours. for 10 days  -     methylPREDNISolone (MEDROL DOSEPACK) 4 mg tablet; use as directed  -     ofloxacin (OCUFLOX) 0.3 % ophthalmic solution; Apply 10 drops into ear once daily for 7 days.    Augmentin x 10 days  Medrol  Ofloxacin AD  Recheck in 2 weeks      Erma Low PA-C  [facility]   [facility]  [address]  [place] [address]  P: [phone]  F: [phone]

## 2019-07-25 NOTE — PROGRESS NOTES
Patient in today for recheck of blood pressure due to recent elevated readings at home and office visit. Blood pressure is 150/84 and pulse is 75. Patient did bring her home machine to compare readings and her blood pressure is 174/100 and pulse is 83. Patient is complaining of pain today and does have an ENT appointment this afternoon.  notified of today's readings and has increased the patients Hydralazine 25mg tablets to, two tablets by mouth twice a day. Patient notified of the increase of medication and advised to call the office if she experiences any dizziness or other issues with the increase. Patient will schedule a followup nurse visit for recheck blood pressure within 1-2 weeks depending on how she is feeling with the increase of dose. Advised to also change batteries in home blood pressure machine.  Advised to call the office as needed, patient verbalized understanding.

## 2019-08-05 NOTE — PROGRESS NOTES
Subjective:   Patient: Leslie Wood [id number], :1940   Visit date:2019 11:28 AM    Chief Complaint:  Follow-up (2 week rtc still c/o shooting pain in Right ear )    HPI:  Leslie is a 79 y.o. female who is here for follow-up. She was last here on 19 for R AOM/OE tx with Augmentin, Medrol, and Ofloxacin drops. She rtc 19 and reported significant relief in otalgia, no further otorrhea, no HL. She is very happy with the relief she has received. She does c/o a residual, intermittent, sharp pain on the right side of her jaw that at times radiates up her scalp. She does have a hx of TMJ and owns a night splint, she has not worn this in many years, stated she has not had a flare-up in years. No other complaints.  No other relieving factors.         Review of Systems:  -     Allergic/Immunologic: is allergic to amitriptyline; hydrochlorothiazide; lisinopril; percocet [oxycodone-acetaminophen]; and codeine..  -     Constitutional: Current temp:      Her meds, allergies, medical, surgical, social & family histories were reviewed & updated:  -     She has a current medication list which includes the following prescription(s): amlodipine, hydralazine, hydrocodone-acetaminophen, lactobac no.41/bifidobact no.7, tizanidine, trazodone, and vitamin d.  -     She  has a past medical history of Arthritis, Cataract, GERD (gastroesophageal reflux disease), and Hypertension.   -     She does not have any pertinent problems on file.   -     She  has a past surgical history that includes Back surgery; Eye surgery; Appendectomy; Adrenal gland surgery; Hemorrhoid surgery; Hernia repair; Hysterectomy; Tonsillectomy; and Adenoidectomy.  -     She  reports that she has never smoked. She has never used smokeless tobacco. She reports that she does not drink alcohol or use drugs.  -     Her family history includes Diabetes in her mother; Heart disease in her mother; Hypertension in her father and mother; Kidney disease in her  "father.  -     She is allergic to amitriptyline; hydrochlorothiazide; lisinopril; percocet [oxycodone-acetaminophen]; and codeine.    Objective:     Physical Exam:  Vitals:  Ht 5' 1" (1.549 m)   Wt 70.6 kg (155 lb 10.3 oz)   BMI 29.41 kg/m²   Appearance:  Well-developed, well-nourished.  Communication:  Able to communicate, no hoarseness.  Head & Face:  Normocephalic, atraumatic, no sinus tenderness, normal facial strength.  Eyes:  Extraocular motions intact.  Ears:  Otoscopy of external auditory canals and tympanic membranes was normal, clinical speech reception thresholds grossly intact, no mass/lesion of auricle.  Nose:  No masses/lesions of external nose, nasal mucosa, septum, and turbinates were within normal limits.  Mouth:  No mass/lesion of lips, teeth, gums, hard/soft palate, tongue, tonsils, or oropharynx. TTP over R TMJ with slight subluxation with opening jaw.   Neck & Lymphatics:  No cervical lymphadenopathy, no neck mass/crepitus/ asymmetry, trachea is midline, no thyroid enlargement/tenderness/mass.  Neuro/Psych: Alert with normal mood and affect.   Abdominal: Normal appearance.   Respiration/Chest:  Symmetric expansion during respiration, normal respiratory effort.  Skin:  Warm and intact  Cardiovascular:  No peripheral vascular edema or varicosities.    Assessment & Plan:   Leslie was seen today for follow-up.    Diagnoses and all orders for this visit:    Arthralgia of right temporomandibular joint    Advised pt to see TMJ specialist to have her night splint checked and possibly adjusted for bite. Information for specialist given today in clinic.     R OE/OM resolved.   RTC PRN        Erma Low PA-C  [facility]   [facility]  [address]  [place] [address]  P: [phone]  F: [phone]      "

## 2019-08-06 ENCOUNTER — OFFICE VISIT (OUTPATIENT)
Dept: OTOLARYNGOLOGY | Facility: CLINIC | Age: 79
End: 2019-08-06
Payer: MEDICARE

## 2019-08-06 VITALS — WEIGHT: 155.63 LBS | HEIGHT: 61 IN | BODY MASS INDEX: 29.38 KG/M2

## 2019-08-06 DIAGNOSIS — M26.621 ARTHRALGIA OF RIGHT TEMPOROMANDIBULAR JOINT: Primary | ICD-10-CM

## 2019-08-06 PROCEDURE — 99213 PR OFFICE/OUTPT VISIT, EST, LEVL III, 20-29 MIN: ICD-10-PCS | Mod: S$PBB,,, | Performed by: PHYSICIAN ASSISTANT

## 2019-08-06 PROCEDURE — 99213 OFFICE O/P EST LOW 20 MIN: CPT | Mod: PBBFAC | Performed by: PHYSICIAN ASSISTANT

## 2019-08-06 PROCEDURE — 99999 PR PBB SHADOW E&M-EST. PATIENT-LVL III: ICD-10-PCS | Mod: PBBFAC,,, | Performed by: PHYSICIAN ASSISTANT

## 2019-08-06 PROCEDURE — 99999 PR PBB SHADOW E&M-EST. PATIENT-LVL III: CPT | Mod: PBBFAC,,, | Performed by: PHYSICIAN ASSISTANT

## 2019-08-06 PROCEDURE — 99213 OFFICE O/P EST LOW 20 MIN: CPT | Mod: S$PBB,,, | Performed by: PHYSICIAN ASSISTANT

## 2019-08-06 NOTE — PATIENT INSTRUCTIONS
TMJ Syndrome  This is a condition with chronic or recurrent pain in the joint of the jaw (in front of the ear). The pain may cause limited motion of the jaw, a locking or catching sensation, clicking, popping or grinding sounds from the joint with movement. It may also lead to headache, earache or neck pain. It is sometimes caused by inflammation in the joint, injury or wear-and-tear of the cartilage in the joint, involuntary grinding of the teeth or poorly fitting dentures. Emotional stress and tension are often a factor. Most cases resolve completely within a few months with proper treatment.    Home Care:  1) Rest the jaw by avoiding crunchy or hard foods to chew. Do not eat hard or sticky candies. Soft foods and liquids are easier on the jaw. Protect your jaw while yawning.  2) Hot packs (small towel soaked in hot water) applied to the jaw may give relief by reducing muscle spasm. You may use a heating pad or a towel soaked in hot water. Some people get relief with cold packs, so try both and see which one works best for you.  3) You may use ibuprofen (Motrin, Advil) to control pain, unless another medicine was prescribed. [ NOTE : If you have chronic liver or kidney disease or ever had a stomach ulcer or GI bleeding, talk with your doctor before using these medicines.]  4) If you suspect emotional stress is related to your condition.  a) Try to identify the sources of stress in your life. It may not be obvious! These may include:  -- Daily hassles of life that pile up (traffic jams, missed appointments, car troubles)  -- Major life changes, both good (new baby, job promotion) and bad (loss of job, loss of loved one)  -- Overload: feeling that you have too many responsibilities and can't take care of everything at once  -- Helplessness: feeling like your problems are more than you can solve  b) When possible, do something about the source of your stress: avoid hassles, limit the amount of change that is  happening in your life at one time and take a break when you feel overloaded.  c) Unfortunately, many stressful situations cannot be avoided. Therefore, it is necessary to learn HOW TO MANAGE STRESS better. There are many proven methods that work and will reduce your anxiety. These include simple things like exercise, good nutrition and adequate rest. Also, there are certain techniques that are helpful: relaxation and breathing exercises, visualization, biofeedback, meditation or simply taking some time-out to clear your mind. For more information about this, consult your doctor or go to a local bookstore and review the many books and tapes available on this subject.    Follow-Up  If you continue to have symptoms I recommend seeing a TMJ Specialist.  In the [place] area:    Dr. Jayden Bradley, [place].  Address: [address] [place] [address]  P:[phone]   F:[phone]

## 2019-08-08 ENCOUNTER — OFFICE VISIT (OUTPATIENT)
Dept: INTERNAL MEDICINE | Facility: CLINIC | Age: 79
End: 2019-08-08
Payer: MEDICARE

## 2019-08-08 VITALS
HEART RATE: 84 BPM | TEMPERATURE: 97 F | WEIGHT: 154.31 LBS | RESPIRATION RATE: 18 BRPM | BODY MASS INDEX: 29.13 KG/M2 | DIASTOLIC BLOOD PRESSURE: 70 MMHG | OXYGEN SATURATION: 98 % | HEIGHT: 61 IN | SYSTOLIC BLOOD PRESSURE: 138 MMHG

## 2019-08-08 DIAGNOSIS — R60.0 LOWER EXTREMITY EDEMA: Primary | ICD-10-CM

## 2019-08-08 DIAGNOSIS — I10 ESSENTIAL HYPERTENSION: ICD-10-CM

## 2019-08-08 PROCEDURE — 99213 OFFICE O/P EST LOW 20 MIN: CPT | Mod: S$PBB,,, | Performed by: FAMILY MEDICINE

## 2019-08-08 PROCEDURE — 99999 PR PBB SHADOW E&M-EST. PATIENT-LVL III: ICD-10-PCS | Mod: PBBFAC,,, | Performed by: FAMILY MEDICINE

## 2019-08-08 PROCEDURE — 99213 PR OFFICE/OUTPT VISIT, EST, LEVL III, 20-29 MIN: ICD-10-PCS | Mod: S$PBB,,, | Performed by: FAMILY MEDICINE

## 2019-08-08 PROCEDURE — 99213 OFFICE O/P EST LOW 20 MIN: CPT | Mod: PBBFAC | Performed by: FAMILY MEDICINE

## 2019-08-08 PROCEDURE — 99999 PR PBB SHADOW E&M-EST. PATIENT-LVL III: CPT | Mod: PBBFAC,,, | Performed by: FAMILY MEDICINE

## 2019-08-08 RX ORDER — HYDRALAZINE HYDROCHLORIDE 50 MG/1
50 TABLET, FILM COATED ORAL EVERY 12 HOURS
Qty: 180 TABLET | Refills: 1 | Status: SHIPPED | OUTPATIENT
Start: 2019-08-08 | End: 2019-12-02

## 2019-08-08 RX ORDER — AMLODIPINE BESYLATE 5 MG/1
5 TABLET ORAL DAILY
Qty: 90 TABLET | Refills: 1 | Status: SHIPPED | OUTPATIENT
Start: 2019-08-08 | End: 2020-09-16 | Stop reason: SDUPTHER

## 2019-08-08 NOTE — PROGRESS NOTES
Subjective:       Patient ID: Leslie Wood is a 79 y.o. female.    Chief Complaint: Follow-up    HPI Ms. Wood presents today for follow up and a couple complaints.     Ear infections recurrent  Seen by ENT they suggested wearing mouth splint again    Notes elevated blood pressures at home  Reports 198/105  201/100  She increased amlodipine 10 mg and notes swelling in both lower extremities.   Increased hydralazine to 50 mg am and pm    Brought cuff last month and numbers were not accurate. See NV 7/25/2019    Does not do well on diuretics she has had them multiple times.   She gets muscle cramps    Review of Systems   Constitutional: Positive for fatigue. Negative for activity change, appetite change and chills.   HENT: Negative.    Respiratory: Negative.    Musculoskeletal: Negative.    Neurological: Negative.    Psychiatric/Behavioral: Negative.            Past Medical History:   Diagnosis Date    Arthritis     Cataract     GERD (gastroesophageal reflux disease)     Hypertension          Objective:        Physical Exam   Constitutional: She is oriented to person, place, and time. She appears well-developed and well-nourished.   HENT:   Head: Normocephalic and atraumatic.   Neurological: She is alert and oriented to person, place, and time.   Skin: Skin is warm.   Psychiatric: She has a normal mood and affect. Her behavior is normal.   Vitals reviewed.      Assessment/Plan:     Lower extremity edema    Essential hypertension  -     amLODIPine (NORVASC) 5 MG tablet; Take 1 tablet (5 mg total) by mouth once daily.  Dispense: 90 tablet; Refill: 1  -     hydrALAZINE (APRESOLINE) 50 MG tablet; Take 1 tablet (50 mg total) by mouth every 12 (twelve) hours.  Dispense: 180 tablet; Refill: 1        Cut amlodipine back to 5 mg as it may be the cause of edema.    Follow up  On BP in 2 weeks to see if something needs to be added   Change batteries in machine and bring it again in 2 weeks.   Follow up with me pending NV  results     Angeles Carson MD  [facility]

## 2019-08-13 ENCOUNTER — TELEPHONE (OUTPATIENT)
Dept: OTOLARYNGOLOGY | Facility: CLINIC | Age: 79
End: 2019-08-13

## 2019-08-13 ENCOUNTER — OFFICE VISIT (OUTPATIENT)
Dept: OTOLARYNGOLOGY | Facility: CLINIC | Age: 79
End: 2019-08-13
Payer: MEDICARE

## 2019-08-13 VITALS
SYSTOLIC BLOOD PRESSURE: 151 MMHG | WEIGHT: 153.44 LBS | BODY MASS INDEX: 28.97 KG/M2 | DIASTOLIC BLOOD PRESSURE: 81 MMHG | HEART RATE: 82 BPM | HEIGHT: 61 IN | TEMPERATURE: 98 F

## 2019-08-13 DIAGNOSIS — M54.2 NECK PAIN: Primary | ICD-10-CM

## 2019-08-13 PROCEDURE — 99999 PR PBB SHADOW E&M-EST. PATIENT-LVL III: CPT | Mod: PBBFAC,,, | Performed by: PHYSICIAN ASSISTANT

## 2019-08-13 PROCEDURE — 99213 OFFICE O/P EST LOW 20 MIN: CPT | Mod: PBBFAC | Performed by: PHYSICIAN ASSISTANT

## 2019-08-13 PROCEDURE — 99213 PR OFFICE/OUTPT VISIT, EST, LEVL III, 20-29 MIN: ICD-10-PCS | Mod: S$PBB,,, | Performed by: PHYSICIAN ASSISTANT

## 2019-08-13 PROCEDURE — 99999 PR PBB SHADOW E&M-EST. PATIENT-LVL III: ICD-10-PCS | Mod: PBBFAC,,, | Performed by: PHYSICIAN ASSISTANT

## 2019-08-13 PROCEDURE — 99213 OFFICE O/P EST LOW 20 MIN: CPT | Mod: S$PBB,,, | Performed by: PHYSICIAN ASSISTANT

## 2019-08-13 NOTE — PROGRESS NOTES
Subjective:       Patient ID: Leslie Wood is a 79 y.o. female.    Chief Complaint: Neck Pain (Left side)     Leslie is a 79 y.o. female who is here for follow-up.  She has been treated by Erma Low PA-C over the past few months for multiple issues including left  parotitis, right OM/OE and TMJ. She is now having left neck pain extending up to scalp. She took a Norco last night with some relief this morning. She is followed by pain management for chronic back pain. She denies any pain with swallowing, fever, ear pain or discharge. She has a history of TMJ and migraines but has not had any flares for many years.     Review of Systems   Constitutional: Negative for chills, fatigue, fever and unexpected weight change.   HENT: Negative for congestion, dental problem, ear discharge, ear pain, facial swelling, hearing loss, nosebleeds, postnasal drip, rhinorrhea, sinus pressure, sneezing, sore throat, tinnitus, trouble swallowing and voice change.    Eyes: Negative for redness, itching and visual disturbance.   Respiratory: Negative for cough, choking, shortness of breath and wheezing.    Cardiovascular: Negative for chest pain and palpitations.   Gastrointestinal: Negative for abdominal pain.        No reflux.   Musculoskeletal: Positive for neck pain. Negative for gait problem.   Skin: Negative for rash.   Neurological: Positive for headaches. Negative for dizziness and light-headedness.         Objective:      Physical Exam   Constitutional: She is oriented to person, place, and time. She appears well-developed and well-nourished. No distress.   HENT:   Head: Normocephalic and atraumatic.   Right Ear: Tympanic membrane, external ear and ear canal normal.   Left Ear: Tympanic membrane, external ear and ear canal normal.   Nose: Nose normal. No mucosal edema, rhinorrhea, nasal deformity or septal deviation. No epistaxis. Right sinus exhibits no maxillary sinus tenderness and no frontal sinus tenderness. Left sinus  exhibits no maxillary sinus tenderness and no frontal sinus tenderness.   Mouth/Throat: Uvula is midline, oropharynx is clear and moist and mucous membranes are normal. Mucous membranes are not pale and not dry. No dental caries. No oropharyngeal exudate or posterior oropharyngeal erythema.   No tenderness overlying parotid gland or TMJ   Eyes: Pupils are equal, round, and reactive to light. Conjunctivae, EOM and lids are normal. Right eye exhibits no chemosis. Left eye exhibits no chemosis. Right conjunctiva is not injected. Left conjunctiva is not injected. No scleral icterus. Right eye exhibits normal extraocular motion and no nystagmus. Left eye exhibits normal extraocular motion and no nystagmus.   Neck: Trachea normal and phonation normal. No tracheal tenderness present. No tracheal deviation present. No thyroid mass and no thyromegaly present.   Pain with movement, no swelling appreciated, tenderness to left posterior scalp   Cardiovascular: Intact distal pulses.   Pulmonary/Chest: Effort normal. No stridor. No respiratory distress.   Abdominal: She exhibits no distension.   Lymphadenopathy:        Head (right side): No submental, no submandibular, no preauricular, no posterior auricular and no occipital adenopathy present.        Head (left side): No submental, no submandibular, no preauricular, no posterior auricular and no occipital adenopathy present.     She has no cervical adenopathy.   Neurological: She is alert and oriented to person, place, and time. No cranial nerve deficit.   Skin: Skin is warm and dry. No rash noted. No erythema.   Psychiatric: She has a normal mood and affect. Her behavior is normal.       Assessment:       1. Neck pain        Plan:       I don't believe that her neck pain has an ENT etiology. IT seems more muscular. I would rec an evaluation by her pcp ( Dr. Carson) or her pain management team. She understands and agrees with plan of care.

## 2019-08-13 NOTE — TELEPHONE ENCOUNTER
----- Message from Yamileth Santos sent at 8/13/2019  8:48 AM CDT -----  Contact: PT        Type:  Same Day Appointment Request    Caller is requesting a same day appointment.  Caller declined first available appointment listed below.    Name of Caller:Leslie Wood  When is the first available appointment?08/15/2019  Symptoms:infected saliva gland, pain in neck  Best Call Back Number:[phone] (home) [phone] (work)  Additional Information:

## 2019-08-21 ENCOUNTER — TELEPHONE (OUTPATIENT)
Dept: INTERNAL MEDICINE | Facility: CLINIC | Age: 79
End: 2019-08-21

## 2019-08-21 NOTE — TELEPHONE ENCOUNTER
----- Message from Yamila Henriquez sent at 8/21/2019  9:41 AM CDT -----  Patient needs call back to r/s nurse visit.[phone]

## 2019-08-23 ENCOUNTER — TELEPHONE (OUTPATIENT)
Dept: INTERNAL MEDICINE | Facility: CLINIC | Age: 79
End: 2019-08-23

## 2019-08-23 NOTE — TELEPHONE ENCOUNTER
----- Message from Christine Graham sent at 8/23/2019  9:11 AM CDT -----  Contact: pt   Pt stated she calling to reschedule a nurses visit, she can be reached at [phone]

## 2019-12-02 ENCOUNTER — LAB VISIT (OUTPATIENT)
Dept: LAB | Facility: HOSPITAL | Age: 79
End: 2019-12-02
Attending: FAMILY MEDICINE
Payer: MEDICARE

## 2019-12-02 ENCOUNTER — OFFICE VISIT (OUTPATIENT)
Dept: INTERNAL MEDICINE | Facility: CLINIC | Age: 79
End: 2019-12-02
Payer: MEDICARE

## 2019-12-02 VITALS
TEMPERATURE: 96 F | RESPIRATION RATE: 16 BRPM | WEIGHT: 153.69 LBS | DIASTOLIC BLOOD PRESSURE: 68 MMHG | HEIGHT: 61 IN | SYSTOLIC BLOOD PRESSURE: 154 MMHG | BODY MASS INDEX: 29.02 KG/M2 | HEART RATE: 78 BPM | OXYGEN SATURATION: 97 %

## 2019-12-02 DIAGNOSIS — R20.9 COLD SENSATION OF SKIN: ICD-10-CM

## 2019-12-02 DIAGNOSIS — I10 ESSENTIAL HYPERTENSION: ICD-10-CM

## 2019-12-02 DIAGNOSIS — R73.03 PRE-DIABETES: ICD-10-CM

## 2019-12-02 DIAGNOSIS — E83.52 HYPERCALCEMIA: Primary | ICD-10-CM

## 2019-12-02 DIAGNOSIS — I10 ESSENTIAL HYPERTENSION: Primary | ICD-10-CM

## 2019-12-02 DIAGNOSIS — R60.0 BILATERAL LEG EDEMA: ICD-10-CM

## 2019-12-02 LAB
ANION GAP SERPL CALC-SCNC: 9 MMOL/L (ref 8–16)
BASOPHILS # BLD AUTO: 0.05 K/UL (ref 0–0.2)
BASOPHILS NFR BLD: 0.5 % (ref 0–1.9)
BUN SERPL-MCNC: 25 MG/DL (ref 8–23)
CALCIUM SERPL-MCNC: 10.8 MG/DL (ref 8.7–10.5)
CHLORIDE SERPL-SCNC: 105 MMOL/L (ref 95–110)
CO2 SERPL-SCNC: 30 MMOL/L (ref 23–29)
CREAT SERPL-MCNC: 1.1 MG/DL (ref 0.5–1.4)
DIFFERENTIAL METHOD: ABNORMAL
EOSINOPHIL # BLD AUTO: 0.1 K/UL (ref 0–0.5)
EOSINOPHIL NFR BLD: 1.1 % (ref 0–8)
ERYTHROCYTE [DISTWIDTH] IN BLOOD BY AUTOMATED COUNT: 14.5 % (ref 11.5–14.5)
EST. GFR  (AFRICAN AMERICAN): 54.4 ML/MIN/1.73 M^2
EST. GFR  (NON AFRICAN AMERICAN): 47.2 ML/MIN/1.73 M^2
GLUCOSE SERPL-MCNC: 75 MG/DL (ref 70–110)
HCT VFR BLD AUTO: 46.4 % (ref 37–48.5)
HGB BLD-MCNC: 14.6 G/DL (ref 12–16)
IMM GRANULOCYTES # BLD AUTO: 0.03 K/UL (ref 0–0.04)
IMM GRANULOCYTES NFR BLD AUTO: 0.3 % (ref 0–0.5)
LYMPHOCYTES # BLD AUTO: 2.7 K/UL (ref 1–4.8)
LYMPHOCYTES NFR BLD: 25.9 % (ref 18–48)
MCH RBC QN AUTO: 28 PG (ref 27–31)
MCHC RBC AUTO-ENTMCNC: 31.5 G/DL (ref 32–36)
MCV RBC AUTO: 89 FL (ref 82–98)
MONOCYTES # BLD AUTO: 1.2 K/UL (ref 0.3–1)
MONOCYTES NFR BLD: 11.4 % (ref 4–15)
NEUTROPHILS # BLD AUTO: 6.4 K/UL (ref 1.8–7.7)
NEUTROPHILS NFR BLD: 60.8 % (ref 38–73)
NRBC BLD-RTO: 0 /100 WBC
PLATELET # BLD AUTO: 306 K/UL (ref 150–350)
PMV BLD AUTO: 10.2 FL (ref 9.2–12.9)
POTASSIUM SERPL-SCNC: 4.3 MMOL/L (ref 3.5–5.1)
RBC # BLD AUTO: 5.21 M/UL (ref 4–5.4)
SODIUM SERPL-SCNC: 144 MMOL/L (ref 136–145)
TSH SERPL DL<=0.005 MIU/L-ACNC: 1.55 UIU/ML (ref 0.4–4)
VIT B12 SERPL-MCNC: 408 PG/ML (ref 210–950)
WBC # BLD AUTO: 10.48 K/UL (ref 3.9–12.7)

## 2019-12-02 PROCEDURE — 80048 BASIC METABOLIC PNL TOTAL CA: CPT

## 2019-12-02 PROCEDURE — 84443 ASSAY THYROID STIM HORMONE: CPT

## 2019-12-02 PROCEDURE — 83036 HEMOGLOBIN GLYCOSYLATED A1C: CPT

## 2019-12-02 PROCEDURE — 1125F AMNT PAIN NOTED PAIN PRSNT: CPT | Mod: ,,, | Performed by: FAMILY MEDICINE

## 2019-12-02 PROCEDURE — 99214 OFFICE O/P EST MOD 30 MIN: CPT | Mod: PBBFAC,25 | Performed by: FAMILY MEDICINE

## 2019-12-02 PROCEDURE — 36415 COLL VENOUS BLD VENIPUNCTURE: CPT

## 2019-12-02 PROCEDURE — 82607 VITAMIN B-12: CPT

## 2019-12-02 PROCEDURE — 1159F MED LIST DOCD IN RCRD: CPT | Mod: ,,, | Performed by: FAMILY MEDICINE

## 2019-12-02 PROCEDURE — 1159F PR MEDICATION LIST DOCUMENTED IN MEDICAL RECORD: ICD-10-PCS | Mod: ,,, | Performed by: FAMILY MEDICINE

## 2019-12-02 PROCEDURE — 90662 IIV NO PRSV INCREASED AG IM: CPT | Mod: PBBFAC

## 2019-12-02 PROCEDURE — 99999 PR PBB SHADOW E&M-EST. PATIENT-LVL IV: ICD-10-PCS | Mod: PBBFAC,,, | Performed by: FAMILY MEDICINE

## 2019-12-02 PROCEDURE — 85025 COMPLETE CBC W/AUTO DIFF WBC: CPT

## 2019-12-02 PROCEDURE — 99999 PR PBB SHADOW E&M-EST. PATIENT-LVL IV: CPT | Mod: PBBFAC,,, | Performed by: FAMILY MEDICINE

## 2019-12-02 PROCEDURE — 99214 PR OFFICE/OUTPT VISIT, EST, LEVL IV, 30-39 MIN: ICD-10-PCS | Mod: S$PBB,,, | Performed by: FAMILY MEDICINE

## 2019-12-02 PROCEDURE — 99214 OFFICE O/P EST MOD 30 MIN: CPT | Mod: S$PBB,,, | Performed by: FAMILY MEDICINE

## 2019-12-02 PROCEDURE — 1125F PR PAIN SEVERITY QUANTIFIED, PAIN PRESENT: ICD-10-PCS | Mod: ,,, | Performed by: FAMILY MEDICINE

## 2019-12-02 RX ORDER — VALSARTAN 80 MG/1
80 TABLET ORAL 2 TIMES DAILY
Qty: 180 TABLET | Refills: 0 | Status: SHIPPED | OUTPATIENT
Start: 2019-12-02 | End: 2020-02-06 | Stop reason: SDUPTHER

## 2019-12-02 NOTE — PROGRESS NOTES
Leslie Wood  79 y.o. White female    Here for follow up on hypertension and leg swelling .    Bilateral leg swelling    Started taking amlodipine at night   She has been taking 10 mg in the morning and at night.  She stopped the Hydralazine 50 mg and has been feeling better than she did with taking them.   Took Lisinopril for years   Muscle cramps with diuretics     Cold all the time   Reports taking medications as instructed.  Not taking any OTC meds.    Past Medical History:   Diagnosis Date    Arthritis     Cataract     GERD (gastroesophageal reflux disease)     Hypertension          Current Outpatient Medications:     amLODIPine (NORVASC) 5 MG tablet, Take 1 tablet (5 mg total) by mouth once daily., Disp: 90 tablet, Rfl: 1    hydrocodone-acetaminophen 10-325mg (NORCO)  mg Tab, , Disp: , Rfl:     Lactobac no.41/Bifidobact no.7 (PROBIOTIC-10 ORAL), Take by mouth., Disp: , Rfl:     tizanidine (ZANAFLEX) 4 MG tablet, Take 1 tablet (4 mg total) by mouth every 6 (six) hours as needed (HOLD while on CIPRO)., Disp: , Rfl:     traZODone (DESYREL) 50 MG tablet, Take 1 tablet (50 mg total) by mouth nightly as needed for Insomnia., Disp: 90 tablet, Rfl: 1    vitamin D (VITAMIN D3) 1000 units Tab, Take 1,000 Units by mouth., Disp: , Rfl:     valsartan (DIOVAN) 80 MG tablet, Take 1 tablet (80 mg total) by mouth 2 (two) times daily., Disp: 180 tablet, Rfl: 0    Review of patient's allergies indicates:   Allergen Reactions    Amitriptyline     Hydrochlorothiazide      Causes muscle cramping    Lisinopril      hyperkalemia    Percocet [oxycodone-acetaminophen] Other (See Comments)     Seizures    Codeine Nausea Only and Rash       ROS: Denies dizziness, headache, chest pain, shortness of breath or edema    PE:  GEN: Alert and oriented, no acute distress  HEART: Normal S1 and S2, RRR, no lower extremity edema  LUNGS: Respirations unlabored, bilaterally clear to auscultation  Bilateral LE Edema up to shin  trace       ASSESSMENT/PLAN:    Essential hypertension-uncontrolled  -     Basic metabolic panel; Future; Expected date: 12/02/2019  -     valsartan (DIOVAN) 80 MG tablet; Take 1 tablet (80 mg total) by mouth 2 (two) times daily.  Dispense: 180 tablet; Refill: 0  Will start with taking Diovan once a day then increase to BID if still have elevated blood pressures.   Stop amlodipine for now as B/L LE is a possible side effect if needed restart at 2.5 mg or 5 mg   Cold sensation of skin  -     Vitamin B12; Future; Expected date: 12/02/2019  -     CBC auto differential; Future; Expected date: 12/02/2019  -     TSH; Future; Expected date: 12/02/2019    Pre-diabetes  -     Hemoglobin A1c; Future; Expected date: 12/02/2019    Bilateral leg edema  Stop amlodipine for now     Other orders  -     Influenza - High Dose (65+) (PF) (IM)    Follow for NV up in 2 weeks

## 2019-12-03 LAB
ESTIMATED AVG GLUCOSE: 120 MG/DL (ref 68–131)
HBA1C MFR BLD HPLC: 5.8 % (ref 4–5.6)

## 2019-12-06 ENCOUNTER — TELEPHONE (OUTPATIENT)
Dept: INTERNAL MEDICINE | Facility: CLINIC | Age: 79
End: 2019-12-06

## 2019-12-06 NOTE — TELEPHONE ENCOUNTER
----- Message from Erma Sanabria sent at 12/6/2019  8:18 AM CST -----  Contact: Self  Type:  Patient Returning Call    Who Called:Leslie  Who Left Message for Patient:  Does the patient know what this is regarding?:  Would the patient rather a call back or a response via [facility]? call  Best Call Back Number:[phone]  Additional Information:

## 2019-12-13 ENCOUNTER — TELEPHONE (OUTPATIENT)
Dept: INTERNAL MEDICINE | Facility: CLINIC | Age: 79
End: 2019-12-13

## 2019-12-13 ENCOUNTER — CLINICAL SUPPORT (OUTPATIENT)
Dept: INTERNAL MEDICINE | Facility: CLINIC | Age: 79
End: 2019-12-13
Payer: MEDICARE

## 2019-12-13 ENCOUNTER — LAB VISIT (OUTPATIENT)
Dept: LAB | Facility: HOSPITAL | Age: 79
End: 2019-12-13
Attending: FAMILY MEDICINE
Payer: MEDICARE

## 2019-12-13 VITALS — DIASTOLIC BLOOD PRESSURE: 64 MMHG | SYSTOLIC BLOOD PRESSURE: 144 MMHG

## 2019-12-13 DIAGNOSIS — E83.52 HYPERCALCEMIA: ICD-10-CM

## 2019-12-13 LAB
ANION GAP SERPL CALC-SCNC: 10 MMOL/L (ref 8–16)
BUN SERPL-MCNC: 22 MG/DL (ref 8–23)
CALCIUM SERPL-MCNC: 10.5 MG/DL (ref 8.7–10.5)
CHLORIDE SERPL-SCNC: 106 MMOL/L (ref 95–110)
CO2 SERPL-SCNC: 29 MMOL/L (ref 23–29)
CREAT SERPL-MCNC: 0.9 MG/DL (ref 0.5–1.4)
EST. GFR  (AFRICAN AMERICAN): >60 ML/MIN/1.73 M^2
EST. GFR  (NON AFRICAN AMERICAN): >60 ML/MIN/1.73 M^2
GLUCOSE SERPL-MCNC: 130 MG/DL (ref 70–110)
POTASSIUM SERPL-SCNC: 4.2 MMOL/L (ref 3.5–5.1)
SODIUM SERPL-SCNC: 145 MMOL/L (ref 136–145)

## 2019-12-13 PROCEDURE — 36415 COLL VENOUS BLD VENIPUNCTURE: CPT

## 2019-12-13 PROCEDURE — 99999 PR PBB SHADOW E&M-EST. PATIENT-LVL II: CPT | Mod: PBBFAC,,,

## 2019-12-13 PROCEDURE — 99212 OFFICE O/P EST SF 10 MIN: CPT | Mod: PBBFAC

## 2019-12-13 PROCEDURE — 80048 BASIC METABOLIC PNL TOTAL CA: CPT

## 2019-12-13 PROCEDURE — 99999 PR PBB SHADOW E&M-EST. PATIENT-LVL II: ICD-10-PCS | Mod: PBBFAC,,,

## 2019-12-13 RX ORDER — CARVEDILOL 3.12 MG/1
3.12 TABLET ORAL 2 TIMES DAILY WITH MEALS
Qty: 60 TABLET | Refills: 0 | Status: SHIPPED | OUTPATIENT
Start: 2019-12-13 | End: 2020-01-13

## 2019-12-13 NOTE — TELEPHONE ENCOUNTER
----- Message from Meri Johnson sent at 12/13/2019 12:10 PM CST -----  Contact: self  needs to reschedule nurse visit....[phone]

## 2019-12-13 NOTE — PROGRESS NOTES
Dr. Moser ordered a new medication for pt   Pt left the building attempted to call her twice and left voicemail  Follow up apt made for 2 weeks.  Will try to call again on monday

## 2019-12-13 NOTE — PROGRESS NOTES
Pt came in for /70 at recheck 144/64.  No symptoms today, states she gets headaches off and on.

## 2019-12-18 NOTE — TELEPHONE ENCOUNTER
----- Message from Christine Graham sent at 12/18/2019  1:10 PM CST -----  Contact: pt   Type:  Patient Returning Call    Who Called: pt   Who Left Message for Patient:Dr Carson  Does the patient know what this is regarding?: not sure   Would the patient rather a call back or a response via [facility]? Call back  Best Call Back Number: [phone]  Additional Information:

## 2019-12-19 DIAGNOSIS — F51.01 PRIMARY INSOMNIA: ICD-10-CM

## 2019-12-19 RX ORDER — TRAZODONE HYDROCHLORIDE 50 MG/1
TABLET ORAL
Qty: 90 TABLET | Refills: 0 | Status: SHIPPED | OUTPATIENT
Start: 2019-12-19 | End: 2020-03-17

## 2019-12-19 NOTE — TELEPHONE ENCOUNTER
----- Message from Darlin Menendez sent at 12/19/2019  9:55 AM CST -----  Contact: pt   She's calling in regards to medication       ,pls call pt back at [phone]

## 2019-12-27 ENCOUNTER — OFFICE VISIT (OUTPATIENT)
Dept: INTERNAL MEDICINE | Facility: CLINIC | Age: 79
End: 2019-12-27
Payer: MEDICARE

## 2019-12-27 VITALS
OXYGEN SATURATION: 96 % | BODY MASS INDEX: 29.05 KG/M2 | HEIGHT: 61 IN | TEMPERATURE: 97 F | HEART RATE: 74 BPM | SYSTOLIC BLOOD PRESSURE: 152 MMHG | RESPIRATION RATE: 18 BRPM | DIASTOLIC BLOOD PRESSURE: 70 MMHG | WEIGHT: 153.88 LBS

## 2019-12-27 DIAGNOSIS — I10 ESSENTIAL HYPERTENSION: Primary | ICD-10-CM

## 2019-12-27 PROCEDURE — 1159F MED LIST DOCD IN RCRD: CPT | Mod: ,,, | Performed by: FAMILY MEDICINE

## 2019-12-27 PROCEDURE — 99999 PR PBB SHADOW E&M-EST. PATIENT-LVL IV: ICD-10-PCS | Mod: PBBFAC,,, | Performed by: FAMILY MEDICINE

## 2019-12-27 PROCEDURE — 1159F PR MEDICATION LIST DOCUMENTED IN MEDICAL RECORD: ICD-10-PCS | Mod: ,,, | Performed by: FAMILY MEDICINE

## 2019-12-27 PROCEDURE — 99213 OFFICE O/P EST LOW 20 MIN: CPT | Mod: S$PBB,,, | Performed by: FAMILY MEDICINE

## 2019-12-27 PROCEDURE — 99999 PR PBB SHADOW E&M-EST. PATIENT-LVL IV: CPT | Mod: PBBFAC,,, | Performed by: FAMILY MEDICINE

## 2019-12-27 PROCEDURE — 1126F PR PAIN SEVERITY QUANTIFIED, NO PAIN PRESENT: ICD-10-PCS | Mod: ,,, | Performed by: FAMILY MEDICINE

## 2019-12-27 PROCEDURE — 99214 OFFICE O/P EST MOD 30 MIN: CPT | Mod: PBBFAC | Performed by: FAMILY MEDICINE

## 2019-12-27 PROCEDURE — 1126F AMNT PAIN NOTED NONE PRSNT: CPT | Mod: ,,, | Performed by: FAMILY MEDICINE

## 2019-12-27 PROCEDURE — 99213 PR OFFICE/OUTPT VISIT, EST, LEVL III, 20-29 MIN: ICD-10-PCS | Mod: S$PBB,,, | Performed by: FAMILY MEDICINE

## 2019-12-27 RX ORDER — HYDRALAZINE HYDROCHLORIDE 10 MG/1
10 TABLET, FILM COATED ORAL EVERY 12 HOURS
Qty: 60 TABLET | Refills: 1 | Status: ON HOLD | OUTPATIENT
Start: 2019-12-27 | End: 2020-08-02

## 2019-12-27 NOTE — PROGRESS NOTES
Leslie Wood  79 y.o. White female    Here for follow up on hypertension.      She did not discontinue the amlodipine as previously discussed and is still having LE edema   Not taking any OTC meds.    She has been taking 160 mg of Diovan twice a day     Past Medical History:   Diagnosis Date    Arthritis     Cataract     GERD (gastroesophageal reflux disease)     Hypertension          Current Outpatient Medications:     amLODIPine (NORVASC) 5 MG tablet, Take 1 tablet (5 mg total) by mouth once daily., Disp: 90 tablet, Rfl: 1    carvedilol (COREG) 3.125 MG tablet, Take 1 tablet (3.125 mg total) by mouth 2 (two) times daily with meals., Disp: 60 tablet, Rfl: 0    hydrocodone-acetaminophen 10-325mg (NORCO)  mg Tab, , Disp: , Rfl:     Lactobac no.41/Bifidobact no.7 (PROBIOTIC-10 ORAL), Take by mouth., Disp: , Rfl:     tizanidine (ZANAFLEX) 4 MG tablet, Take 1 tablet (4 mg total) by mouth every 6 (six) hours as needed (HOLD while on CIPRO)., Disp: , Rfl:     traZODone (DESYREL) 50 MG tablet, TAKE 1 TABLET BY MOUTH NIGHTLY AS NEEDED FOR INSOMNIA, Disp: 90 tablet, Rfl: 0    valsartan (DIOVAN) 80 MG tablet, Take 1 tablet (80 mg total) by mouth 2 (two) times daily., Disp: 180 tablet, Rfl: 0    vitamin D (VITAMIN D3) 1000 units Tab, Take 1,000 Units by mouth., Disp: , Rfl:     Review of patient's allergies indicates:   Allergen Reactions    Amitriptyline     Hydrochlorothiazide      Causes muscle cramping    Lisinopril      hyperkalemia    Percocet [oxycodone-acetaminophen] Other (See Comments)     Seizures    Codeine Nausea Only and Rash       ROS: Denies dizziness, headache, chest pain, shortness of breath or edema    PE:  GEN: Alert and oriented, no acute distress  HEART: Normal S1 and S2, RRR, no lower extremity edema  LUNGS: Respirations unlabored, bilaterally clear to auscultation    ASSESSMENT/PLAN:  Essential hypertension  -     hydrALAZINE (APRESOLINE) 10 MG tablet; Take 1 tablet (10 mg  total) by mouth every 12 (twelve) hours.  Dispense: 60 tablet; Refill: 1    Continue diovan 160 mg BID  Discontinue Amlodipine   Starting Hydralazine 10 mg  She has had 50 mg but did not like the way this made her feel.     Follow up 2 weeks NV then 4 weeks with me

## 2020-01-13 RX ORDER — CARVEDILOL 3.12 MG/1
TABLET ORAL
Qty: 60 TABLET | Refills: 0 | Status: SHIPPED | OUTPATIENT
Start: 2020-01-13 | End: 2020-01-24 | Stop reason: SDUPTHER

## 2020-01-13 NOTE — TELEPHONE ENCOUNTER
Pt states her bp 186/140 after taking morning meds at 7am and check her bp 30 min later   No acute symptoms  Yesterday bp was  172/126  Still taking hydralazine, coreg, valsartan  States its been running high all week  Please advise

## 2020-01-17 NOTE — TELEPHONE ENCOUNTER
Notified pt to double up on coreg and keep follow up apt with Dr. Carson  Pt verbalized understanding

## 2020-01-24 ENCOUNTER — OFFICE VISIT (OUTPATIENT)
Dept: INTERNAL MEDICINE | Facility: CLINIC | Age: 80
End: 2020-01-24
Payer: MEDICARE

## 2020-01-24 VITALS
RESPIRATION RATE: 16 BRPM | DIASTOLIC BLOOD PRESSURE: 78 MMHG | BODY MASS INDEX: 28.64 KG/M2 | WEIGHT: 151.69 LBS | TEMPERATURE: 97 F | OXYGEN SATURATION: 96 % | HEART RATE: 70 BPM | SYSTOLIC BLOOD PRESSURE: 142 MMHG | HEIGHT: 61 IN

## 2020-01-24 DIAGNOSIS — M46.1 SACROILIITIS, NOT ELSEWHERE CLASSIFIED: ICD-10-CM

## 2020-01-24 DIAGNOSIS — I20.89 OTHER FORMS OF ANGINA PECTORIS: ICD-10-CM

## 2020-01-24 DIAGNOSIS — E11.9 TYPE 2 DIABETES MELLITUS WITHOUT COMPLICATION, WITHOUT LONG-TERM CURRENT USE OF INSULIN: ICD-10-CM

## 2020-01-24 DIAGNOSIS — I10 ESSENTIAL HYPERTENSION: Primary | ICD-10-CM

## 2020-01-24 PROCEDURE — 1125F PR PAIN SEVERITY QUANTIFIED, PAIN PRESENT: ICD-10-PCS | Mod: ,,, | Performed by: FAMILY MEDICINE

## 2020-01-24 PROCEDURE — 99999 PR PBB SHADOW E&M-EST. PATIENT-LVL III: ICD-10-PCS | Mod: PBBFAC,,, | Performed by: FAMILY MEDICINE

## 2020-01-24 PROCEDURE — 1159F MED LIST DOCD IN RCRD: CPT | Mod: ,,, | Performed by: FAMILY MEDICINE

## 2020-01-24 PROCEDURE — 99999 PR PBB SHADOW E&M-EST. PATIENT-LVL III: CPT | Mod: PBBFAC,,, | Performed by: FAMILY MEDICINE

## 2020-01-24 PROCEDURE — 99213 OFFICE O/P EST LOW 20 MIN: CPT | Mod: S$PBB,,, | Performed by: FAMILY MEDICINE

## 2020-01-24 PROCEDURE — 99213 PR OFFICE/OUTPT VISIT, EST, LEVL III, 20-29 MIN: ICD-10-PCS | Mod: S$PBB,,, | Performed by: FAMILY MEDICINE

## 2020-01-24 PROCEDURE — 1159F PR MEDICATION LIST DOCUMENTED IN MEDICAL RECORD: ICD-10-PCS | Mod: ,,, | Performed by: FAMILY MEDICINE

## 2020-01-24 PROCEDURE — 1125F AMNT PAIN NOTED PAIN PRSNT: CPT | Mod: ,,, | Performed by: FAMILY MEDICINE

## 2020-01-24 PROCEDURE — 99213 OFFICE O/P EST LOW 20 MIN: CPT | Mod: PBBFAC | Performed by: FAMILY MEDICINE

## 2020-01-24 RX ORDER — CARVEDILOL 3.12 MG/1
TABLET ORAL
Qty: 60 TABLET | Refills: 3 | Status: SHIPPED | OUTPATIENT
Start: 2020-01-24 | End: 2020-01-24 | Stop reason: SDUPTHER

## 2020-01-24 RX ORDER — CARVEDILOL 6.25 MG/1
TABLET ORAL
Qty: 180 TABLET | Refills: 1 | Status: SHIPPED | OUTPATIENT
Start: 2020-01-24 | End: 2020-07-27

## 2020-01-24 NOTE — PROGRESS NOTES
Leslie Wood  79 y.o. White female    Here for follow up on hypertension.    Stopped amlodipine secondary to swelling.   Swelling has improved.     She has been having back pain.   MRI was ordered but that can't be done b/c of the nerve stimulator present.   Waiting to hear from the doctor to see what her next steps are.   Pain is moving to the legs.     Pain has been increasing over the past 3 months .  In PT currently but doesn't see improvement     Reports taking medications as instructed.  Not taking any OTC meds.    Past Medical History:   Diagnosis Date    Arthritis     Cataract     GERD (gastroesophageal reflux disease)     Hypertension          Current Outpatient Medications:     carvedilol (COREG) 3.125 MG tablet, TAKE (1) TABLET BY MOUTH 2 TIMES A DAY WITH MEALS (Patient taking differently: Take 3.125 mg by mouth 2 (two) times daily. ), Disp: 60 tablet, Rfl: 0    hydrALAZINE (APRESOLINE) 10 MG tablet, Take 1 tablet (10 mg total) by mouth every 12 (twelve) hours., Disp: 60 tablet, Rfl: 1    hydrocodone-acetaminophen 10-325mg (NORCO)  mg Tab, , Disp: , Rfl:     Lactobac no.41/Bifidobact no.7 (PROBIOTIC-10 ORAL), Take by mouth., Disp: , Rfl:     tizanidine (ZANAFLEX) 4 MG tablet, Take 1 tablet (4 mg total) by mouth every 6 (six) hours as needed (HOLD while on CIPRO)., Disp: , Rfl:     traZODone (DESYREL) 50 MG tablet, TAKE 1 TABLET BY MOUTH NIGHTLY AS NEEDED FOR INSOMNIA, Disp: 90 tablet, Rfl: 0    valsartan (DIOVAN) 80 MG tablet, Take 1 tablet (80 mg total) by mouth 2 (two) times daily., Disp: 180 tablet, Rfl: 0    vitamin D (VITAMIN D3) 1000 units Tab, Take 1,000 Units by mouth., Disp: , Rfl:     amLODIPine (NORVASC) 5 MG tablet, Take 1 tablet (5 mg total) by mouth once daily. (Patient not taking: Reported on 1/24/2020), Disp: 90 tablet, Rfl: 1    Review of patient's allergies indicates:   Allergen Reactions    Amitriptyline     Hydrochlorothiazide      Causes muscle cramping     Lisinopril      hyperkalemia    Percocet [oxycodone-acetaminophen] Other (See Comments)     Seizures    Codeine Nausea Only and Rash       ROS: Denies dizziness, headache, chest pain, shortness of breath or edema    PE:  GEN: Alert and oriented, no acute distress  HEART: Normal S1 and S2, RRR, no lower extremity edema  LUNGS: Respirations unlabored, bilaterally clear to auscultation    ASSESSMENT/PLAN:  Essential hypertension  -     carvedilol (COREG) 6.25 MG tablet; TAKE (1) TABLET BY MOUTH 2 TIMES A DAY WITH MEALS  Dispense: 180 tablet; Refill: 1  Increased hydralazine to 3 times a day after rechecking blood pressure and it was higher    Type 2 diabetes mellitus without complication, without long-term current use of insulin-a1c 5.8    Sacroiliitis, not elsewhere classified-not flared today    Other forms of angina pectoris-continue medication previously prescribed    Other orders  -     Discontinue: carvedilol (COREG) 3.125 MG tablet; TAKE (1) TABLET BY MOUTH 2 TIMES A DAY WITH MEALS  Dispense: 60 tablet; Refill: 3    Follow up 4 weeks

## 2020-02-04 DIAGNOSIS — I10 ESSENTIAL HYPERTENSION: Primary | ICD-10-CM

## 2020-02-06 ENCOUNTER — CLINICAL SUPPORT (OUTPATIENT)
Dept: CARDIOLOGY | Facility: CLINIC | Age: 80
End: 2020-02-06
Payer: MEDICARE

## 2020-02-06 ENCOUNTER — OFFICE VISIT (OUTPATIENT)
Dept: CARDIOLOGY | Facility: CLINIC | Age: 80
End: 2020-02-06
Payer: MEDICARE

## 2020-02-06 VITALS
SYSTOLIC BLOOD PRESSURE: 160 MMHG | HEART RATE: 59 BPM | WEIGHT: 150.38 LBS | BODY MASS INDEX: 28.39 KG/M2 | DIASTOLIC BLOOD PRESSURE: 78 MMHG | HEIGHT: 61 IN

## 2020-02-06 DIAGNOSIS — I10 ESSENTIAL HYPERTENSION: Primary | ICD-10-CM

## 2020-02-06 DIAGNOSIS — I10 ESSENTIAL HYPERTENSION: ICD-10-CM

## 2020-02-06 DIAGNOSIS — E11.9 TYPE 2 DIABETES MELLITUS WITHOUT COMPLICATION, WITHOUT LONG-TERM CURRENT USE OF INSULIN: ICD-10-CM

## 2020-02-06 DIAGNOSIS — Z78.9 STATIN INTOLERANCE: ICD-10-CM

## 2020-02-06 DIAGNOSIS — Z87.898 HISTORY OF PREDIABETES: ICD-10-CM

## 2020-02-06 DIAGNOSIS — E78.5 HYPERLIPIDEMIA LDL GOAL <100: ICD-10-CM

## 2020-02-06 DIAGNOSIS — E55.9 VITAMIN D DEFICIENCY: ICD-10-CM

## 2020-02-06 PROCEDURE — 93010 EKG 12-LEAD: ICD-10-PCS | Mod: S$PBB,,, | Performed by: INTERNAL MEDICINE

## 2020-02-06 PROCEDURE — 99999 PR PBB SHADOW E&M-EST. PATIENT-LVL III: CPT | Mod: PBBFAC,,, | Performed by: INTERNAL MEDICINE

## 2020-02-06 PROCEDURE — 93005 ELECTROCARDIOGRAM TRACING: CPT | Mod: PBBFAC | Performed by: INTERNAL MEDICINE

## 2020-02-06 PROCEDURE — 93010 ELECTROCARDIOGRAM REPORT: CPT | Mod: S$PBB,,, | Performed by: INTERNAL MEDICINE

## 2020-02-06 PROCEDURE — 99999 PR PBB SHADOW E&M-EST. PATIENT-LVL III: ICD-10-PCS | Mod: PBBFAC,,, | Performed by: INTERNAL MEDICINE

## 2020-02-06 PROCEDURE — 99213 OFFICE O/P EST LOW 20 MIN: CPT | Mod: PBBFAC,25 | Performed by: INTERNAL MEDICINE

## 2020-02-06 PROCEDURE — 99204 OFFICE O/P NEW MOD 45 MIN: CPT | Mod: S$PBB,25,, | Performed by: INTERNAL MEDICINE

## 2020-02-06 PROCEDURE — 99204 PR OFFICE/OUTPT VISIT, NEW, LEVL IV, 45-59 MIN: ICD-10-PCS | Mod: S$PBB,25,, | Performed by: INTERNAL MEDICINE

## 2020-02-06 RX ORDER — HYDROCHLOROTHIAZIDE 12.5 MG/1
12.5 TABLET ORAL DAILY
Qty: 90 TABLET | Refills: 3 | Status: ON HOLD | OUTPATIENT
Start: 2020-02-06 | End: 2020-08-02

## 2020-02-06 RX ORDER — VALSARTAN 160 MG/1
80 TABLET ORAL 2 TIMES DAILY
Qty: 180 TABLET | Refills: 3 | Status: SHIPPED | OUTPATIENT
Start: 2020-02-06 | End: 2020-09-25 | Stop reason: SDUPTHER

## 2020-02-06 NOTE — PROGRESS NOTES
Subjective:   Patient ID:  Leslie Wood is a 79 y.o. female who presents for evaluation of Essential hypertension (New patient) and Leg Swelling      HPI  An 80 yo female with htn chronic back pain is here for evaluation she is having issues with htn control she is symptomatic with heaDCAHES. HER BP STAYS IN -200 RANGE. SHE IS COMPLAINT WITH MEDS AND SALT.BACK PAIN IS A MAJOR LIMITATION.NO CHEST PAIN  OR SHORTNESS OF BREATH SHE GETS OCCASIONAL PALPITATIONS. SHE STILL WORKS IN HER [employer]. HAS A LOT OF JOINT PAIN AND ARTHRITIS SYMPTOMS.  Past Medical History:   Diagnosis Date    Arthritis     Cataract     GERD (gastroesophageal reflux disease)     Hypertension        Past Surgical History:   Procedure Laterality Date    ADENOIDECTOMY      ADRENAL GLAND SURGERY      APPENDECTOMY      BACK SURGERY      fusion l 4-5 s 1,2,3  fusion l 2-3    EYE SURGERY      HEMORRHOID SURGERY      HERNIA REPAIR      HYSTERECTOMY      TONSILLECTOMY         Social History     Tobacco Use    Smoking status: Never Smoker    Smokeless tobacco: Never Used   Substance Use Topics    Alcohol use: No    Drug use: No       Family History   Problem Relation Age of Onset    Heart disease Mother     Hypertension Mother     Diabetes Mother     Hypertension Father     Kidney disease Father        Current Outpatient Medications   Medication Sig    carvedilol (COREG) 6.25 MG tablet TAKE (1) TABLET BY MOUTH 2 TIMES A DAY WITH MEALS    hydrALAZINE (APRESOLINE) 10 MG tablet Take 1 tablet (10 mg total) by mouth every 12 (twelve) hours.    hydrocodone-acetaminophen 10-325mg (NORCO)  mg Tab     Lactobac no.41/Bifidobact no.7 (PROBIOTIC-10 ORAL) Take by mouth.    tizanidine (ZANAFLEX) 4 MG tablet Take 1 tablet (4 mg total) by mouth every 6 (six) hours as needed (HOLD while on CIPRO).    traZODone (DESYREL) 50 MG tablet TAKE 1 TABLET BY MOUTH NIGHTLY AS NEEDED FOR INSOMNIA    UNABLE TO FIND Tumeric root extract     valsartan (DIOVAN) 80 MG tablet Take 1 tablet (80 mg total) by mouth 2 (two) times daily.    vitamin D (VITAMIN D3) 1000 units Tab Take 1,000 Units by mouth.    amLODIPine (NORVASC) 5 MG tablet Take 1 tablet (5 mg total) by mouth once daily. (Patient not taking: Reported on 1/24/2020)     No current facility-administered medications for this visit.      Current Outpatient Medications on File Prior to Visit   Medication Sig    carvedilol (COREG) 6.25 MG tablet TAKE (1) TABLET BY MOUTH 2 TIMES A DAY WITH MEALS    hydrALAZINE (APRESOLINE) 10 MG tablet Take 1 tablet (10 mg total) by mouth every 12 (twelve) hours.    hydrocodone-acetaminophen 10-325mg (NORCO)  mg Tab     Lactobac no.41/Bifidobact no.7 (PROBIOTIC-10 ORAL) Take by mouth.    tizanidine (ZANAFLEX) 4 MG tablet Take 1 tablet (4 mg total) by mouth every 6 (six) hours as needed (HOLD while on CIPRO).    traZODone (DESYREL) 50 MG tablet TAKE 1 TABLET BY MOUTH NIGHTLY AS NEEDED FOR INSOMNIA    UNABLE TO FIND Tumeric root extract    valsartan (DIOVAN) 80 MG tablet Take 1 tablet (80 mg total) by mouth 2 (two) times daily.    vitamin D (VITAMIN D3) 1000 units Tab Take 1,000 Units by mouth.    amLODIPine (NORVASC) 5 MG tablet Take 1 tablet (5 mg total) by mouth once daily. (Patient not taking: Reported on 1/24/2020)     No current facility-administered medications on file prior to visit.        Review of patient's allergies indicates:   Allergen Reactions    Amitriptyline     Hydrochlorothiazide      Causes muscle cramping    Lisinopril      hyperkalemia    Percocet [oxycodone-acetaminophen] Other (See Comments)     Seizures    Codeine Nausea Only and Rash       Review of Systems   Constitution: Negative for diaphoresis, malaise/fatigue and weight gain.   HENT: Negative for hoarse voice.    Eyes: Negative for double vision and visual disturbance.   Cardiovascular: Positive for palpitations. Negative for chest pain, claudication, cyanosis,  dyspnea on exertion, irregular heartbeat, leg swelling, near-syncope, orthopnea, paroxysmal nocturnal dyspnea and syncope.   Respiratory: Negative for cough, hemoptysis, shortness of breath and snoring.    Hematologic/Lymphatic: Negative for bleeding problem. Does not bruise/bleed easily.   Skin: Negative for color change and poor wound healing.   Musculoskeletal: Positive for back pain. Negative for muscle cramps, muscle weakness and myalgias.   Gastrointestinal: Negative for bloating, abdominal pain, change in bowel habit, diarrhea, heartburn, hematemesis, hematochezia, melena and nausea.   Neurological: Positive for headaches. Negative for excessive daytime sleepiness, dizziness, light-headedness, loss of balance, numbness and weakness.   Psychiatric/Behavioral: Negative for memory loss. The patient does not have insomnia.    Allergic/Immunologic: Negative for hives.       Objective:   Physical Exam   Constitutional: She is oriented to person, place, and time. She appears well-developed and well-nourished. She does not appear ill. No distress.   HENT:   Head: Normocephalic and atraumatic.   Eyes: Pupils are equal, round, and reactive to light. EOM are normal. No scleral icterus.   Neck: Normal range of motion. Neck supple. Normal carotid pulses, no hepatojugular reflux and no JVD present. Carotid bruit is not present. No tracheal deviation present. No thyromegaly present.   Cardiovascular: Normal rate, regular rhythm, normal heart sounds, intact distal pulses and normal pulses. Exam reveals no gallop and no friction rub.   No murmur heard.  Pulses:       Carotid pulses are 2+ on the right side, and 2+ on the left side.       Radial pulses are 2+ on the right side, and 2+ on the left side.        Femoral pulses are 2+ on the right side, and 2+ on the left side.       Popliteal pulses are 2+ on the right side, and 2+ on the left side.        Dorsalis pedis pulses are 2+ on the right side, and 2+ on the left side.  "       Posterior tibial pulses are 2+ on the right side, and 2+ on the left side.   Pulmonary/Chest: Effort normal and breath sounds normal. No respiratory distress. She has no wheezes. She has no rhonchi. She has no rales. She exhibits no tenderness.   Abdominal: Soft. Normal appearance, normal aorta and bowel sounds are normal. She exhibits no distension, no abdominal bruit, no ascites and no pulsatile midline mass. There is no hepatomegaly. There is no tenderness.   Musculoskeletal: She exhibits no edema.        Right shoulder: She exhibits no deformity.   Neurological: She is alert and oriented to person, place, and time. She has normal strength. No cranial nerve deficit. Coordination normal.   Skin: Skin is warm and dry. No rash noted. She is not diaphoretic. No cyanosis or erythema. Nails show no clubbing.   Psychiatric: She has a normal mood and affect. Her speech is normal and behavior is normal.   Nursing note and vitals reviewed.    Vitals:    02/06/20 1424 02/06/20 1427   BP: (!) 156/80 (!) 160/78   BP Location: Left arm Right arm   Patient Position: Sitting Sitting   BP Method: Medium (Manual) Medium (Manual)   Pulse: (!) 59    Weight: 68.2 kg (150 lb 5.7 oz)    Height: 5' 1" (1.549 m)      No results found for: CHOL  No results found for: HDL  No results found for: LDLCALC  No results found for: TRIG  No results found for: CHOLHDL    Chemistry        Component Value Date/Time     12/13/2019 1541    K 4.2 12/13/2019 1541     12/13/2019 1541    CO2 29 12/13/2019 1541    BUN 22 12/13/2019 1541    CREATININE 0.9 12/13/2019 1541     (H) 12/13/2019 1541        Component Value Date/Time    CALCIUM 10.5 12/13/2019 1541    ALKPHOS 82 06/24/2019 1643    AST 18 06/24/2019 1643    ALT 16 06/24/2019 1643    BILITOT 0.3 06/24/2019 1643    ESTGFRAFRICA >60.0 12/13/2019 1541    EGFRNONAA >60.0 12/13/2019 1541        Lab Results   Component Value Date    HGBA1C 5.8 (H) 12/02/2019       Lab Results "   Component Value Date    TSH 1.549 12/02/2019     Lab Results   Component Value Date    INR 1.0 08/28/2013     Lab Results   Component Value Date    WBC 10.48 12/02/2019    HGB 14.6 12/02/2019    HCT 46.4 12/02/2019    MCV 89 12/02/2019     12/02/2019     BNP  @LABRCNTIP(BNP,BNPTRIAGEBLO)@  CrCl cannot be calculated (Patient's most recent lab result is older than the maximum 7 days allowed.).  Assessment:     1. Essential hypertension    2. History of prediabetes    3. Vitamin D deficiency    4. Hyperlipidemia LDL goal <100    5. Statin intolerance    6. Type 2 diabetes mellitus without complication, without long-term current use of insulin      HAS UNCONTROLLED HTN WILL START AS FIRST STEP BY INCREASING VALSARTAN  MG PO BID AND ADD HCTZ 12.5 MG (SHE HAD CRAMPS BEFORE WILL OFFSET BY USING MG AND K SUPPLEMENT OVER THE COUNTER) WILL EQUILIBRATE FOR 2 WEEKS AND REASSESS.  Plan:   ECHO   HOLTER FOR PALPITATION.  VALSARTAN 160 BID   HCTZ 12.5 MG PO DAILY.

## 2020-03-02 ENCOUNTER — HOSPITAL ENCOUNTER (OUTPATIENT)
Dept: CARDIOLOGY | Facility: HOSPITAL | Age: 80
Discharge: HOME | End: 2020-03-02
Attending: INTERNAL MEDICINE
Payer: MEDICARE

## 2020-03-02 VITALS
HEIGHT: 61 IN | DIASTOLIC BLOOD PRESSURE: 78 MMHG | SYSTOLIC BLOOD PRESSURE: 160 MMHG | WEIGHT: 150 LBS | BODY MASS INDEX: 28.32 KG/M2

## 2020-03-02 DIAGNOSIS — I10 ESSENTIAL HYPERTENSION: ICD-10-CM

## 2020-03-02 DIAGNOSIS — E78.5 HYPERLIPIDEMIA LDL GOAL <100: ICD-10-CM

## 2020-03-02 DIAGNOSIS — E11.9 TYPE 2 DIABETES MELLITUS WITHOUT COMPLICATION, WITHOUT LONG-TERM CURRENT USE OF INSULIN: ICD-10-CM

## 2020-03-02 LAB
AORTIC ROOT ANNULUS: 2.8 CM
AV INDEX (PROSTH): 0.68
AV MEAN GRADIENT: 6 MMHG
AV PEAK GRADIENT: 12 MMHG
AV VALVE AREA: 1.77 CM2
AV VELOCITY RATIO: 0.69
BSA FOR ECHO PROCEDURE: 1.71 M2
CV ECHO LV RWT: 0.64 CM
DOP CALC AO PEAK VEL: 1.71 M/S
DOP CALC AO VTI: 37.32 CM
DOP CALC LVOT AREA: 2.6 CM2
DOP CALC LVOT DIAMETER: 1.82 CM
DOP CALC LVOT PEAK VEL: 1.18 M/S
DOP CALC LVOT STROKE VOLUME: 66.02 CM3
DOP CALCLVOT PEAK VEL VTI: 25.39 CM
E WAVE DECELERATION TIME: 233.36 MSEC
E/A RATIO: 0.83
E/E' RATIO: 10.67 M/S
ECHO LV POSTERIOR WALL: 1.21 CM (ref 0.6–1.1)
FRACTIONAL SHORTENING: 36 % (ref 28–44)
INTERVENTRICULAR SEPTUM: 1.26 CM (ref 0.6–1.1)
IVRT: 152 MSEC
LA MAJOR: 4.16 CM
LA MINOR: 4.06 CM
LA WIDTH: 3.26 CM
LEFT ATRIUM SIZE: 3.57 CM
LEFT ATRIUM VOLUME INDEX: 24.3 ML/M2
LEFT ATRIUM VOLUME: 40.65 CM3
LEFT INTERNAL DIMENSION IN SYSTOLE: 2.42 CM (ref 2.1–4)
LEFT VENTRICLE DIASTOLIC VOLUME INDEX: 37.11 ML/M2
LEFT VENTRICLE DIASTOLIC VOLUME: 62.04 ML
LEFT VENTRICLE MASS INDEX: 96 G/M2
LEFT VENTRICLE SYSTOLIC VOLUME INDEX: 12.4 ML/M2
LEFT VENTRICLE SYSTOLIC VOLUME: 20.68 ML
LEFT VENTRICULAR INTERNAL DIMENSION IN DIASTOLE: 3.8 CM (ref 3.5–6)
LEFT VENTRICULAR MASS: 160.03 G
LV LATERAL E/E' RATIO: 11.43 M/S
LV SEPTAL E/E' RATIO: 10 M/S
MV PEAK A VEL: 0.96 M/S
MV PEAK E VEL: 0.8 M/S
PULM VEIN S/D RATIO: 1.31
PV PEAK D VEL: 0.54 M/S
PV PEAK S VEL: 0.71 M/S
PV PEAK VELOCITY: 1.05 CM/S
RA MAJOR: 4.02 CM
RA PRESSURE: 3 MMHG
RA WIDTH: 3.01 CM
RIGHT VENTRICULAR END-DIASTOLIC DIMENSION: 2.92 CM
SINUS: 2.7 CM
STJ: 2.84 CM
TDI LATERAL: 0.07 M/S
TDI SEPTAL: 0.08 M/S
TDI: 0.08 M/S
TRICUSPID ANNULAR PLANE SYSTOLIC EXCURSION: 1.53 CM

## 2020-03-02 PROCEDURE — 93227 HOLTER MONITOR - 24 HOUR (CUPID ONLY): ICD-10-PCS | Mod: ,,, | Performed by: INTERNAL MEDICINE

## 2020-03-02 PROCEDURE — 93306 TTE W/DOPPLER COMPLETE: CPT

## 2020-03-02 PROCEDURE — 93225 XTRNL ECG REC<48 HRS REC: CPT

## 2020-03-02 PROCEDURE — 93306 ECHO (CUPID ONLY): ICD-10-PCS | Mod: 26,,, | Performed by: INTERNAL MEDICINE

## 2020-03-02 PROCEDURE — 93227 XTRNL ECG REC<48 HR R&I: CPT | Mod: ,,, | Performed by: INTERNAL MEDICINE

## 2020-03-02 PROCEDURE — 93306 TTE W/DOPPLER COMPLETE: CPT | Mod: 26,,, | Performed by: INTERNAL MEDICINE

## 2020-03-03 ENCOUNTER — TELEPHONE (OUTPATIENT)
Dept: CARDIOLOGY | Facility: CLINIC | Age: 80
End: 2020-03-03

## 2020-03-03 NOTE — TELEPHONE ENCOUNTER
----- Message from Jaqui Granados MD sent at 3/2/2020 10:01 PM CST -----  Heart function normal. Mild leak in aortic vlve age related no intervention needed.

## 2020-03-04 LAB
OHS CV EVENT MONITOR DAY: 1
OHS CV HOLTER LENGTH DECIMAL HOURS: 48
OHS CV HOLTER LENGTH HOURS: 24
OHS CV HOLTER LENGTH MINUTES: 0

## 2020-03-09 ENCOUNTER — TELEPHONE (OUTPATIENT)
Dept: CARDIOLOGY | Facility: CLINIC | Age: 80
End: 2020-03-09

## 2020-03-09 NOTE — TELEPHONE ENCOUNTER
Patient was notified of results of holter. All questions were answered. Pt verbalized understanding. Pt will call back with any other questions or concerns.    ----- Message from Jaqui Granados MD sent at 3/7/2020  7:30 AM CST -----  Has few skipped beats but no significant arrythmias

## 2020-03-13 ENCOUNTER — PATIENT OUTREACH (OUTPATIENT)
Dept: ADMINISTRATIVE | Facility: OTHER | Age: 80
End: 2020-03-13

## 2020-03-16 ENCOUNTER — TELEPHONE (OUTPATIENT)
Dept: CARDIOLOGY | Facility: CLINIC | Age: 80
End: 2020-03-16

## 2020-03-17 DIAGNOSIS — F51.01 PRIMARY INSOMNIA: ICD-10-CM

## 2020-03-17 RX ORDER — TRAZODONE HYDROCHLORIDE 50 MG/1
TABLET ORAL
Qty: 90 TABLET | Refills: 0 | Status: SHIPPED | OUTPATIENT
Start: 2020-03-17 | End: 2020-06-18

## 2020-03-17 NOTE — TELEPHONE ENCOUNTER
----- Message from Oleg Dunbar sent at 3/17/2020 11:01 AM CDT -----  Contact: pt  Pt called and would like to have the nurse call her back    Pt can be reached at [phone]

## 2020-03-24 ENCOUNTER — LAB VISIT (OUTPATIENT)
Dept: LAB | Facility: HOSPITAL | Age: 80
End: 2020-03-24
Attending: PAIN MEDICINE
Payer: MEDICARE

## 2020-03-24 DIAGNOSIS — M25.571 RIGHT ANKLE PAIN: ICD-10-CM

## 2020-03-24 DIAGNOSIS — M79.89 SWELLING OF RIGHT FOOT: ICD-10-CM

## 2020-03-24 DIAGNOSIS — M79.671 RIGHT FOOT PAIN: Primary | ICD-10-CM

## 2020-03-24 DIAGNOSIS — M25.471 RIGHT ANKLE SWELLING: ICD-10-CM

## 2020-03-24 LAB
BASOPHILS # BLD AUTO: 0.03 K/UL (ref 0–0.2)
BASOPHILS NFR BLD: 0.3 % (ref 0–1.9)
DIFFERENTIAL METHOD: ABNORMAL
EOSINOPHIL # BLD AUTO: 0.1 K/UL (ref 0–0.5)
EOSINOPHIL NFR BLD: 0.5 % (ref 0–8)
ERYTHROCYTE [DISTWIDTH] IN BLOOD BY AUTOMATED COUNT: 13.3 % (ref 11.5–14.5)
HCT VFR BLD AUTO: 43.8 % (ref 37–48.5)
HGB BLD-MCNC: 13.7 G/DL (ref 12–16)
IMM GRANULOCYTES # BLD AUTO: 0.04 K/UL (ref 0–0.04)
IMM GRANULOCYTES NFR BLD AUTO: 0.4 % (ref 0–0.5)
LYMPHOCYTES # BLD AUTO: 1.8 K/UL (ref 1–4.8)
LYMPHOCYTES NFR BLD: 18.8 % (ref 18–48)
MCH RBC QN AUTO: 28.4 PG (ref 27–31)
MCHC RBC AUTO-ENTMCNC: 31.3 G/DL (ref 32–36)
MCV RBC AUTO: 91 FL (ref 82–98)
MONOCYTES # BLD AUTO: 1.2 K/UL (ref 0.3–1)
MONOCYTES NFR BLD: 12.3 % (ref 4–15)
NEUTROPHILS # BLD AUTO: 6.5 K/UL (ref 1.8–7.7)
NEUTROPHILS NFR BLD: 67.7 % (ref 38–73)
NRBC BLD-RTO: 0 /100 WBC
PLATELET # BLD AUTO: 273 K/UL (ref 150–350)
PMV BLD AUTO: 9.2 FL (ref 9.2–12.9)
RBC # BLD AUTO: 4.83 M/UL (ref 4–5.4)
URATE SERPL-MCNC: 7.3 MG/DL (ref 2.4–5.7)
WBC # BLD AUTO: 9.55 K/UL (ref 3.9–12.7)

## 2020-03-24 PROCEDURE — 85025 COMPLETE CBC W/AUTO DIFF WBC: CPT

## 2020-03-24 PROCEDURE — 36415 COLL VENOUS BLD VENIPUNCTURE: CPT

## 2020-03-24 PROCEDURE — 84550 ASSAY OF BLOOD/URIC ACID: CPT

## 2020-03-25 ENCOUNTER — HOSPITAL ENCOUNTER (OUTPATIENT)
Dept: RADIOLOGY | Facility: HOSPITAL | Age: 80
Discharge: HOME | End: 2020-03-25
Attending: PAIN MEDICINE
Payer: MEDICARE

## 2020-03-25 DIAGNOSIS — M79.604 ACUTE LEG PAIN, RIGHT: ICD-10-CM

## 2020-03-25 DIAGNOSIS — M79.671 ACUTE FOOT PAIN, RIGHT: ICD-10-CM

## 2020-03-25 DIAGNOSIS — M79.89 RIGHT LEG SWELLING: ICD-10-CM

## 2020-03-25 PROCEDURE — 93971 EXTREMITY STUDY: CPT | Mod: TC,RT

## 2020-03-26 ENCOUNTER — TELEPHONE (OUTPATIENT)
Dept: CARDIOLOGY | Facility: CLINIC | Age: 80
End: 2020-03-26

## 2020-03-26 NOTE — TELEPHONE ENCOUNTER
----- Message from Leatha Coreas sent at 3/26/2020 11:38 AM CDT -----  Contact: pt  Type:  Patient Returning Call    Who Called:patient  Who Left Message for Patient:  Does the patient know what this is regarding?:appt  Would the patient rather a call back or a response via [facility]? Call back  Best Call Back Number:[phone]  Additional Information: na

## 2020-03-26 NOTE — TELEPHONE ENCOUNTER
I left a voicemail for Ms Nelson to call the office to reschedule her follow up appt with Angela Batres due to her clinic being cancelled for 2 months. Ms Angela Batres will be helping in the hospital for 2 months

## 2020-06-10 ENCOUNTER — PATIENT OUTREACH (OUTPATIENT)
Dept: ADMINISTRATIVE | Facility: OTHER | Age: 80
End: 2020-06-10

## 2020-06-10 DIAGNOSIS — E11.9 TYPE 2 DIABETES MELLITUS WITHOUT COMPLICATION, WITHOUT LONG-TERM CURRENT USE OF INSULIN: Primary | ICD-10-CM

## 2020-07-17 DIAGNOSIS — Z71.89 COMPLEX CARE COORDINATION: ICD-10-CM

## 2020-07-30 ENCOUNTER — TELEPHONE (OUTPATIENT)
Dept: INTERNAL MEDICINE | Facility: CLINIC | Age: 80
End: 2020-07-30

## 2020-07-30 NOTE — TELEPHONE ENCOUNTER
----- Message from Rhonda Melvin sent at 7/30/2020 12:09 PM CDT -----  Type:  Patient Returning Call    Who Called:pt  Who Left Message for Patient:nurse  Does the patient know what this is regarding?:no fever, orange bowels  Would the patient rather a call back or a response via [facility]? Call back  Best Call Back Number:[phone]  Additional Information: #    Thank you

## 2020-07-30 NOTE — TELEPHONE ENCOUNTER
----- Message from Christine Sierra sent at 7/30/2020 12:26 PM CDT -----  Type:  Patient Returning Call    Who Called:  Self     Who Left Message for Patient: Valeria    Does the patient know what this is regarding?: no     Would the patient rather a call back or a response via [facility]?  Call     Best Call Back Number:[phone] (home) [phone] (work)

## 2020-07-30 NOTE — TELEPHONE ENCOUNTER
LM for pt to ask the  to schedule her an appt for her symptoms. Pt phone keeps going to voicemail this is the third time we have called back and forth with one another. Pt needs an appt scheduled.

## 2020-07-30 NOTE — TELEPHONE ENCOUNTER
----- Message from Danika Russell sent at 7/30/2020 11:35 AM CDT -----  Regarding: pt  .Type:  Needs Medical Advice    Who Called: pt   Symptoms (please be specific): Diarrhea /Chest congestion /slight cough    How long has patient had these symptoms: 2-3days   Pharmacy name and phone #:  .  [facility] - [place], [place] - [address] [address] [address]  [address] [address] [address]  [facility] [address]  Phone: [phone] Fax: [phone]      Would the patient rather a call back or a response via [facility]? [facility] back   Best Call Back Number: [phone]   Additional Information:       Thank You,   Danika Russell

## 2020-07-31 ENCOUNTER — TELEPHONE (OUTPATIENT)
Dept: INTERNAL MEDICINE | Facility: CLINIC | Age: 80
End: 2020-07-31

## 2020-07-31 NOTE — TELEPHONE ENCOUNTER
----- Message from Delta Swan sent at 7/31/2020  9:49 AM CDT -----  Contact: self  Type:  Same Day Appointment Request    Caller is requesting a same day appointment.  Caller declined first available appointment listed below.    Name of Caller:Leslie Wood   When is the first available appointment?2020  Symptoms:fever, diarrhea and congestion   Best Call Back Number:[phone]  Additional Information:

## 2020-08-01 ENCOUNTER — HOSPITAL ENCOUNTER (INPATIENT)
Facility: HOSPITAL | Age: 80
LOS: 3 days | Discharge: HOME | DRG: 178 | End: 2020-08-04
Attending: EMERGENCY MEDICINE | Admitting: INTERNAL MEDICINE
Payer: MEDICARE

## 2020-08-01 DIAGNOSIS — E11.9 TYPE 2 DIABETES MELLITUS WITHOUT COMPLICATION, WITHOUT LONG-TERM CURRENT USE OF INSULIN: ICD-10-CM

## 2020-08-01 DIAGNOSIS — U07.1 COVID-19: Primary | ICD-10-CM

## 2020-08-01 DIAGNOSIS — R07.9 CHEST PAIN: ICD-10-CM

## 2020-08-01 DIAGNOSIS — N17.9 AKI (ACUTE KIDNEY INJURY): ICD-10-CM

## 2020-08-01 LAB
ALBUMIN SERPL BCP-MCNC: 3.9 G/DL (ref 3.5–5.2)
ALP SERPL-CCNC: 53 U/L (ref 55–135)
ALT SERPL W/O P-5'-P-CCNC: 22 U/L (ref 10–44)
ANION GAP SERPL CALC-SCNC: 16 MMOL/L (ref 8–16)
AST SERPL-CCNC: 25 U/L (ref 10–40)
BACTERIA #/AREA URNS HPF: ABNORMAL /HPF
BASOPHILS # BLD AUTO: 0.01 K/UL (ref 0–0.2)
BASOPHILS NFR BLD: 0.2 % (ref 0–1.9)
BILIRUB SERPL-MCNC: 0.3 MG/DL (ref 0.1–1)
BILIRUB UR QL STRIP: NEGATIVE
BNP SERPL-MCNC: <10 PG/ML (ref 0–99)
BUN SERPL-MCNC: 62 MG/DL (ref 8–23)
CALCIUM SERPL-MCNC: 9.2 MG/DL (ref 8.7–10.5)
CHLORIDE SERPL-SCNC: 103 MMOL/L (ref 95–110)
CK SERPL-CCNC: 61 U/L (ref 20–180)
CLARITY UR: CLEAR
CO2 SERPL-SCNC: 21 MMOL/L (ref 23–29)
COLOR UR: YELLOW
CREAT SERPL-MCNC: 3 MG/DL (ref 0.5–1.4)
CRP SERPL-MCNC: 8 MG/L (ref 0–8.2)
D DIMER PPP IA.FEU-MCNC: 0.81 MG/L FEU
DIFFERENTIAL METHOD: ABNORMAL
EOSINOPHIL # BLD AUTO: 0 K/UL (ref 0–0.5)
EOSINOPHIL NFR BLD: 0 % (ref 0–8)
ERYTHROCYTE [DISTWIDTH] IN BLOOD BY AUTOMATED COUNT: 13.2 % (ref 11.5–14.5)
EST. GFR  (AFRICAN AMERICAN): 16 ML/MIN/1.73 M^2
EST. GFR  (NON AFRICAN AMERICAN): 14 ML/MIN/1.73 M^2
GLUCOSE SERPL-MCNC: 104 MG/DL (ref 70–110)
GLUCOSE UR QL STRIP: NEGATIVE
HCT VFR BLD AUTO: 45.8 % (ref 37–48.5)
HGB BLD-MCNC: 14.7 G/DL (ref 12–16)
HGB UR QL STRIP: NEGATIVE
IMM GRANULOCYTES # BLD AUTO: 0.01 K/UL (ref 0–0.04)
IMM GRANULOCYTES NFR BLD AUTO: 0.2 % (ref 0–0.5)
KETONES UR QL STRIP: NEGATIVE
LACTATE SERPL-SCNC: 0.7 MMOL/L (ref 0.5–2.2)
LDH SERPL L TO P-CCNC: 167 U/L (ref 110–260)
LEUKOCYTE ESTERASE UR QL STRIP: ABNORMAL
LYMPHOCYTES # BLD AUTO: 2.2 K/UL (ref 1–4.8)
LYMPHOCYTES NFR BLD: 42.8 % (ref 18–48)
MAGNESIUM SERPL-MCNC: 2.3 MG/DL (ref 1.6–2.6)
MCH RBC QN AUTO: 28.4 PG (ref 27–31)
MCHC RBC AUTO-ENTMCNC: 32.1 G/DL (ref 32–36)
MCV RBC AUTO: 88 FL (ref 82–98)
MICROSCOPIC COMMENT: ABNORMAL
MONOCYTES # BLD AUTO: 1 K/UL (ref 0.3–1)
MONOCYTES NFR BLD: 18.5 % (ref 4–15)
NEUTROPHILS # BLD AUTO: 2 K/UL (ref 1.8–7.7)
NEUTROPHILS NFR BLD: 38.3 % (ref 38–73)
NITRITE UR QL STRIP: NEGATIVE
NRBC BLD-RTO: 0 /100 WBC
PH UR STRIP: 6 [PH] (ref 5–8)
PHOSPHATE SERPL-MCNC: 5.3 MG/DL (ref 2.7–4.5)
PLATELET # BLD AUTO: 237 K/UL (ref 150–350)
PMV BLD AUTO: 9.7 FL (ref 9.2–12.9)
POTASSIUM SERPL-SCNC: 3.7 MMOL/L (ref 3.5–5.1)
PROCALCITONIN SERPL IA-MCNC: 0.05 NG/ML
PROT SERPL-MCNC: 7.5 G/DL (ref 6–8.4)
PROT UR QL STRIP: NEGATIVE
RBC # BLD AUTO: 5.18 M/UL (ref 4–5.4)
SARS-COV-2 RDRP RESP QL NAA+PROBE: POSITIVE
SODIUM SERPL-SCNC: 140 MMOL/L (ref 136–145)
SP GR UR STRIP: >=1.03 (ref 1–1.03)
SQUAMOUS #/AREA URNS HPF: 3 /HPF
TROPONIN I SERPL DL<=0.01 NG/ML-MCNC: 0.01 NG/ML (ref 0–0.03)
URN SPEC COLLECT METH UR: ABNORMAL
UROBILINOGEN UR STRIP-ACNC: NEGATIVE EU/DL
WBC # BLD AUTO: 5.23 K/UL (ref 3.9–12.7)
WBC #/AREA URNS HPF: 10 /HPF (ref 0–5)

## 2020-08-01 PROCEDURE — 96374 THER/PROPH/DIAG INJ IV PUSH: CPT

## 2020-08-01 PROCEDURE — 11000001 HC ACUTE MED/SURG PRIVATE ROOM

## 2020-08-01 PROCEDURE — 81000 URINALYSIS NONAUTO W/SCOPE: CPT

## 2020-08-01 PROCEDURE — 93005 ELECTROCARDIOGRAM TRACING: CPT

## 2020-08-01 PROCEDURE — 82550 ASSAY OF CK (CPK): CPT

## 2020-08-01 PROCEDURE — 87186 SC STD MICRODIL/AGAR DIL: CPT | Mod: 59

## 2020-08-01 PROCEDURE — 63600175 PHARM REV CODE 636 W HCPCS: Performed by: PHYSICIAN ASSISTANT

## 2020-08-01 PROCEDURE — 83735 ASSAY OF MAGNESIUM: CPT

## 2020-08-01 PROCEDURE — 93010 ELECTROCARDIOGRAM REPORT: CPT | Mod: ,,, | Performed by: INTERNAL MEDICINE

## 2020-08-01 PROCEDURE — 83880 ASSAY OF NATRIURETIC PEPTIDE: CPT

## 2020-08-01 PROCEDURE — 83605 ASSAY OF LACTIC ACID: CPT

## 2020-08-01 PROCEDURE — 85379 FIBRIN DEGRADATION QUANT: CPT

## 2020-08-01 PROCEDURE — 87077 CULTURE AEROBIC IDENTIFY: CPT | Mod: 59

## 2020-08-01 PROCEDURE — 84100 ASSAY OF PHOSPHORUS: CPT

## 2020-08-01 PROCEDURE — 87040 BLOOD CULTURE FOR BACTERIA: CPT

## 2020-08-01 PROCEDURE — 25000003 PHARM REV CODE 250: Performed by: PHYSICIAN ASSISTANT

## 2020-08-01 PROCEDURE — C9113 INJ PANTOPRAZOLE SODIUM, VIA: HCPCS | Performed by: EMERGENCY MEDICINE

## 2020-08-01 PROCEDURE — 85025 COMPLETE CBC W/AUTO DIFF WBC: CPT

## 2020-08-01 PROCEDURE — 36415 COLL VENOUS BLD VENIPUNCTURE: CPT

## 2020-08-01 PROCEDURE — 83615 LACTATE (LD) (LDH) ENZYME: CPT

## 2020-08-01 PROCEDURE — 63600175 PHARM REV CODE 636 W HCPCS: Performed by: EMERGENCY MEDICINE

## 2020-08-01 PROCEDURE — 86140 C-REACTIVE PROTEIN: CPT

## 2020-08-01 PROCEDURE — 80053 COMPREHEN METABOLIC PANEL: CPT

## 2020-08-01 PROCEDURE — U0002 COVID-19 LAB TEST NON-CDC: HCPCS

## 2020-08-01 PROCEDURE — 99291 CRITICAL CARE FIRST HOUR: CPT | Mod: 25

## 2020-08-01 PROCEDURE — 93010 EKG 12-LEAD: ICD-10-PCS | Mod: ,,, | Performed by: INTERNAL MEDICINE

## 2020-08-01 PROCEDURE — 84484 ASSAY OF TROPONIN QUANT: CPT

## 2020-08-01 PROCEDURE — 84145 PROCALCITONIN (PCT): CPT

## 2020-08-01 RX ORDER — SODIUM CHLORIDE 0.9 % (FLUSH) 0.9 %
10 SYRINGE (ML) INJECTION
Status: DISCONTINUED | OUTPATIENT
Start: 2020-08-01 | End: 2020-08-04 | Stop reason: HOSPADM

## 2020-08-01 RX ORDER — PANTOPRAZOLE SODIUM 40 MG/10ML
40 INJECTION, POWDER, LYOPHILIZED, FOR SOLUTION INTRAVENOUS
Status: COMPLETED | OUTPATIENT
Start: 2020-08-01 | End: 2020-08-01

## 2020-08-01 RX ORDER — IBUPROFEN 200 MG
16 TABLET ORAL
Status: DISCONTINUED | OUTPATIENT
Start: 2020-08-01 | End: 2020-08-04 | Stop reason: HOSPADM

## 2020-08-01 RX ORDER — IBUPROFEN 200 MG
24 TABLET ORAL
Status: DISCONTINUED | OUTPATIENT
Start: 2020-08-01 | End: 2020-08-04 | Stop reason: HOSPADM

## 2020-08-01 RX ORDER — GLUCAGON 1 MG
1 KIT INJECTION
Status: DISCONTINUED | OUTPATIENT
Start: 2020-08-01 | End: 2020-08-04 | Stop reason: HOSPADM

## 2020-08-01 RX ORDER — HEPARIN SODIUM 5000 [USP'U]/ML
5000 INJECTION, SOLUTION INTRAVENOUS; SUBCUTANEOUS EVERY 8 HOURS
Status: DISCONTINUED | OUTPATIENT
Start: 2020-08-01 | End: 2020-08-04 | Stop reason: HOSPADM

## 2020-08-01 RX ORDER — SODIUM CHLORIDE 9 MG/ML
INJECTION, SOLUTION INTRAVENOUS CONTINUOUS
Status: DISCONTINUED | OUTPATIENT
Start: 2020-08-01 | End: 2020-08-04 | Stop reason: HOSPADM

## 2020-08-01 RX ADMIN — SODIUM CHLORIDE, SODIUM LACTATE, POTASSIUM CHLORIDE, AND CALCIUM CHLORIDE 1000 ML: .6; .31; .03; .02 INJECTION, SOLUTION INTRAVENOUS at 06:08

## 2020-08-01 RX ADMIN — PANTOPRAZOLE SODIUM 40 MG: 40 INJECTION, POWDER, LYOPHILIZED, FOR SOLUTION INTRAVENOUS at 05:08

## 2020-08-01 RX ADMIN — SODIUM CHLORIDE: 0.9 INJECTION, SOLUTION INTRAVENOUS at 09:08

## 2020-08-01 RX ADMIN — HEPARIN SODIUM 5000 UNITS: 5000 INJECTION, SOLUTION INTRAVENOUS; SUBCUTANEOUS at 09:08

## 2020-08-01 NOTE — ED PROVIDER NOTES
SCRIBE #1 NOTE: I, Wendi Herrera, am scribing for, and in the presence of, Romulo Harris MD. I have scribed the entire note.       History     Chief Complaint   Patient presents with    Chest Congestion     x 4 days, +cough with yellowish gray sputum, +diarrhea x 4 days, fever was 100.1,      Review of patient's allergies indicates:   Allergen Reactions    Amitriptyline     Hydrochlorothiazide      Causes muscle cramping    Lisinopril      hyperkalemia    Percocet [oxycodone-acetaminophen] Other (See Comments)     Seizures    Codeine Nausea Only and Rash         History of Present Illness     HPI    8/1/2020, 4:54 PM  History obtained from the patient      History of Present Illness: Leslie Wood is a 80 y.o. female patient with a PMHx of HTN who presents to the Emergency Department for evaluation of chest congestion which onset gradually 4 days ago. Pt reports she has also been experiencing left-sided mild CP x 3 days and has not been able to get an appointment to see her doctor. Symptoms are constant and moderate in severity. No mitigating or exacerbating factors reported. Associated sxs include yellow, non-bloody diarrhea x 4 days, epigastric pain, cough producing yellow sputum, and fever (Tmax 101). Patient denies any sore throat, n/v, hematochezia, chills, fatigue, and runny nose, and all other sxs at this time. No prior Tx reported. No further complaints or concerns at this time.       Arrival mode: Personal vehicle     PCP: Angeles Carson MD        Past Medical History:  Past Medical History:   Diagnosis Date    Arthritis     Cataract     GERD (gastroesophageal reflux disease)     Hypertension        Past Surgical History:  Past Surgical History:   Procedure Laterality Date    ADENOIDECTOMY      ADRENAL GLAND SURGERY      APPENDECTOMY      BACK SURGERY      fusion l 4-5 s 1,2,3  fusion l 2-3    EYE SURGERY      HEMORRHOID SURGERY      HERNIA REPAIR      HYSTERECTOMY      indirect  lumbar decompression      percutaneous placement of interspinous extension blocker    TONSILLECTOMY           Family History:  Family History   Problem Relation Age of Onset    Heart disease Mother     Hypertension Mother     Diabetes Mother     Hypertension Father     Kidney disease Father        Social History:  Social History     Tobacco Use    Smoking status: Never Smoker    Smokeless tobacco: Never Used   Substance and Sexual Activity    Alcohol use: No    Drug use: No    Sexual activity: Not Currently        Review of Systems     Review of Systems   Constitutional: Positive for fever (Tmax 101). Negative for chills and fatigue.   HENT: Positive for congestion (chest congestion). Negative for rhinorrhea and sore throat.    Respiratory: Positive for cough (producing yellow sputum). Negative for shortness of breath.    Cardiovascular: Positive for chest pain (left-side).   Gastrointestinal: Positive for abdominal pain (epigastric pain) and diarrhea (yellow). Negative for blood in stool, nausea and vomiting.   Genitourinary: Negative for dysuria.   Musculoskeletal: Negative for back pain.   Skin: Negative for rash.   Neurological: Negative for weakness.   Hematological: Does not bruise/bleed easily.   All other systems reviewed and are negative.       Physical Exam     Initial Vitals [08/01/20 1648]   BP Pulse Resp Temp SpO2   99/69 60 20 98.2 °F (36.8 °C) 96 %      MAP       --          Physical Exam  Nursing Notes and Vital Signs Reviewed.  Constitutional: Patient is in no acute distress. Well-developed and well-nourished.  Head: Atraumatic. Normocephalic.  Eyes: PERRL. EOM intact. Conjunctivae are not pale. No scleral icterus.  ENT: Mucous membranes are moist. Oropharynx is clear and symmetric.    Neck: Supple. Full ROM. No lymphadenopathy.  Cardiovascular: Regular rate. Regular rhythm. No murmurs, rubs, or gallops. Distal pulses are 2+ and symmetric.  Pulmonary/Chest: No respiratory distress. Clear  to auscultation bilaterally. No wheezing or rales.  Abdominal: Soft and non-distended.  Epigastric tenderness to palpation.  No rebound, guarding, or rigidity. Good bowel sounds.  Genitourinary: No CVA tenderness  Musculoskeletal: Moves all extremities. No obvious deformities. No edema. No calf tenderness.  Skin: Warm and dry.  Neurological:  Alert, awake, and appropriate.  Normal speech.  No acute focal neurological deficits are appreciated.  Psychiatric: Normal affect. Good eye contact. Appropriate in content.     ED Course   Critical Care    Date/Time: 8/1/2020 6:44 PM  Performed by: Romulo Harris MD  Authorized by: Romulo Harris MD   Direct patient critical care time: 15 minutes  Additional history critical care time: 10 minutes  Ordering / reviewing critical care time: 7 minutes  Documentation critical care time: 5 minutes  Consulting other physicians critical care time: 5 minutes  Total critical care time (exclusive of procedural time) : 42 minutes  Critical care time was exclusive of separately billable procedures and treating other patients and teaching time.  Critical care was necessary to treat or prevent imminent or life-threatening deterioration of the following conditions: renal failure (acute renal failure).  Critical care was time spent personally by me on the following activities: blood draw for specimens, development of treatment plan with patient or surrogate, discussions with consultants, interpretation of cardiac output measurements, evaluation of patient's response to treatment, examination of patient, obtaining history from patient or surrogate, ordering and performing treatments and interventions, ordering and review of laboratory studies, ordering and review of radiographic studies, pulse oximetry, re-evaluation of patient's condition and review of old charts.        ED Vital Signs:  Vitals:    08/01/20 2000 08/01/20 2030 08/01/20 2058 08/01/20 2100   BP: (!) 153/68 (!) 156/70  (!) 147/80    Pulse: (!) 56 (!) 58 63 64   Resp: 20 20 20 20   Temp:       TempSrc:       SpO2: 97% 96% 96% 97%   Weight:       Height:        08/01/20 2130 08/01/20 2200 08/01/20 2230 08/01/20 2300   BP: (!) 136/58 135/62 (!) 115/53 (!) 124/58   Pulse: 66 60 (!) 59 63   Resp: 20 19 20 20   Temp:       TempSrc:       SpO2: 95% 95% 95% 95%   Weight:       Height:        08/01/20 2330 08/01/20 2350 08/02/20 0000 08/02/20 0030   BP: 132/63  (!) 153/68 129/61   Pulse: 60 60 60 (!) 57   Resp: 20 20 20 20   Temp:       TempSrc:       SpO2: 96% 95% 95% 95%   Weight:       Height:        08/02/20 0100 08/02/20 0200 08/02/20 0206   BP: 131/63  (!) 149/65   Pulse: (!) 58 (!) 57 (!) 57   Resp: 20 20 20   Temp:      TempSrc:      SpO2: 96% 95% 95%   Weight:      Height:          Abnormal Lab Results:  Labs Reviewed   CBC W/ AUTO DIFFERENTIAL - Abnormal; Notable for the following components:       Result Value    Mono% 18.5 (*)     All other components within normal limits   COMPREHENSIVE METABOLIC PANEL - Abnormal; Notable for the following components:    CO2 21 (*)     BUN, Bld 62 (*)     Creatinine 3.0 (*)     Alkaline Phosphatase 53 (*)     eGFR if  16 (*)     eGFR if non  14 (*)     All other components within normal limits   SARS-COV-2 RNA AMPLIFICATION, QUAL - Abnormal; Notable for the following components:    SARS-CoV-2 RNA, Amplification, Qual Positive (*)     All other components within normal limits   URINALYSIS, REFLEX TO URINE CULTURE - Abnormal; Notable for the following components:    Specific Gravity, UA >=1.030 (*)     Leukocytes, UA Trace (*)     All other components within normal limits    Narrative:     Specimen Source->Urine   D DIMER, QUANTITATIVE - Abnormal; Notable for the following components:    D-Dimer 0.81 (*)     All other components within normal limits   PHOSPHORUS - Abnormal; Notable for the following components:    Phosphorus 5.3 (*)     All other components within normal  limits   URINALYSIS MICROSCOPIC - Abnormal; Notable for the following components:    WBC, UA 10 (*)     All other components within normal limits    Narrative:     Specimen Source->Urine   CULTURE, BLOOD   CULTURE, BLOOD   TROPONIN I   B-TYPE NATRIURETIC PEPTIDE   CK   LACTATE DEHYDROGENASE   LACTIC ACID, PLASMA   PROCALCITONIN   C-REACTIVE PROTEIN   CBC W/ AUTO DIFFERENTIAL   COMPREHENSIVE METABOLIC PANEL   D DIMER, QUANTITATIVE   FERRITIN   MAGNESIUM   PHOSPHORUS   SEDIMENTATION RATE   CK   C-REACTIVE PROTEIN   MAGNESIUM        All Lab Results:  Results for orders placed or performed during the hospital encounter of 08/01/20   CBC auto differential   Result Value Ref Range    WBC 5.23 3.90 - 12.70 K/uL    RBC 5.18 4.00 - 5.40 M/uL    Hemoglobin 14.7 12.0 - 16.0 g/dL    Hematocrit 45.8 37.0 - 48.5 %    Mean Corpuscular Volume 88 82 - 98 fL    Mean Corpuscular Hemoglobin 28.4 27.0 - 31.0 pg    Mean Corpuscular Hemoglobin Conc 32.1 32.0 - 36.0 g/dL    RDW 13.2 11.5 - 14.5 %    Platelets 237 150 - 350 K/uL    MPV 9.7 9.2 - 12.9 fL    Immature Granulocytes 0.2 0.0 - 0.5 %    Gran # (ANC) 2.0 1.8 - 7.7 K/uL    Immature Grans (Abs) 0.01 0.00 - 0.04 K/uL    Lymph # 2.2 1.0 - 4.8 K/uL    Mono # 1.0 0.3 - 1.0 K/uL    Eos # 0.0 0.0 - 0.5 K/uL    Baso # 0.01 0.00 - 0.20 K/uL    nRBC 0 0 /100 WBC    Gran% 38.3 38.0 - 73.0 %    Lymph% 42.8 18.0 - 48.0 %    Mono% 18.5 (H) 4.0 - 15.0 %    Eosinophil% 0.0 0.0 - 8.0 %    Basophil% 0.2 0.0 - 1.9 %    Differential Method Automated    Comprehensive metabolic panel   Result Value Ref Range    Sodium 140 136 - 145 mmol/L    Potassium 3.7 3.5 - 5.1 mmol/L    Chloride 103 95 - 110 mmol/L    CO2 21 (L) 23 - 29 mmol/L    Glucose 104 70 - 110 mg/dL    BUN, Bld 62 (H) 8 - 23 mg/dL    Creatinine 3.0 (H) 0.5 - 1.4 mg/dL    Calcium 9.2 8.7 - 10.5 mg/dL    Total Protein 7.5 6.0 - 8.4 g/dL    Albumin 3.9 3.5 - 5.2 g/dL    Total Bilirubin 0.3 0.1 - 1.0 mg/dL    Alkaline Phosphatase 53 (L) 55 - 135  U/L    AST 25 10 - 40 U/L    ALT 22 10 - 44 U/L    Anion Gap 16 8 - 16 mmol/L    eGFR if African American 16 (A) >60 mL/min/1.73 m^2    eGFR if non African American 14 (A) >60 mL/min/1.73 m^2   Troponin I #1   Result Value Ref Range    Troponin I 0.012 0.000 - 0.026 ng/mL   B-Type natriuretic peptide (BNP)   Result Value Ref Range    BNP <10 0 - 99 pg/mL   COVID-19 Rapid Screening   Result Value Ref Range    SARS-CoV-2 RNA, Amplification, Qual Positive (A) Negative   Lactate Dehydrogenase   Result Value Ref Range     110 - 260 U/L   Lactic Acid, Plasma   Result Value Ref Range    Lactate (Lactic Acid) 0.7 0.5 - 2.2 mmol/L   Procalcitonin   Result Value Ref Range    Procalcitonin 0.05 <0.25 ng/mL   Urinalysis, Reflex to Urine Culture Urine, Clean Catch    Specimen: Urine   Result Value Ref Range    Specimen UA Urine, Clean Catch     Color, UA Yellow Yellow, Straw, Izabela    Appearance, UA Clear Clear    pH, UA 6.0 5.0 - 8.0    Specific Gravity, UA >=1.030 (A) 1.005 - 1.030    Protein, UA Negative Negative    Glucose, UA Negative Negative    Ketones, UA Negative Negative    Bilirubin (UA) Negative Negative    Occult Blood UA Negative Negative    Nitrite, UA Negative Negative    Urobilinogen, UA Negative <2.0 EU/dL    Leukocytes, UA Trace (A) Negative   D dimer, quantitative   Result Value Ref Range    D-Dimer 0.81 (H) <0.50 mg/L FEU   CK   Result Value Ref Range    CPK 61 20 - 180 U/L   C-Reactive Protein   Result Value Ref Range    CRP 8.0 0.0 - 8.2 mg/L   Phosphorus   Result Value Ref Range    Phosphorus 5.3 (H) 2.7 - 4.5 mg/dL   Magnesium   Result Value Ref Range    Magnesium 2.3 1.6 - 2.6 mg/dL   Urinalysis Microscopic   Result Value Ref Range    WBC, UA 10 (H) 0 - 5 /hpf    Bacteria Occasional None-Occ /hpf    Squam Epithel, UA 3 /hpf    Microscopic Comment SEE COMMENT          Imaging Results:  Imaging Results          X-Ray Chest AP Portable (Final result)  Result time 08/01/20 17:47:22    Final result by  BHARATI Neff Sr., MD (08/01/20 17:47:22)                 Impression:      1. There is no evidence of an acute pulmonary process.  2. There is persistent silhouetting of the inferior aspect of the left border of the heart.  This is characteristic of a pericardial fat pad.  3. There is marked narrowing of the right subacromial space.  This is characteristic of a rotator cuff tear.  4. Surgical changes  .      Electronically signed by: Luis Neff MD  Date:    08/01/2020  Time:    17:47             Narrative:    EXAMINATION:  XR CHEST AP PORTABLE    CLINICAL HISTORY:  Chest Pain;    COMPARISON:  05/11/2017    FINDINGS:  There is persistent silhouetting of the inferior aspect of the left border of the heart.  There is no evidence of an acute pulmonary process.  There is no pneumothorax.  The costophrenic angles are sharp.  There are 2 electrodes projected over the thoracic spine.  There are surgical clips projected over the left upper quadrant of the abdomen.  There is marked narrowing of the right subacromial space.                                 The EKG was ordered, reviewed, and independently interpreted by the ED provider.  Interpretation time: 17:08  Rate: 60 BPM  Rhythm: normal sinus rhythm  Interpretation: No acute ST changes. No STEMI.             The Emergency Provider reviewed the vital signs and test results, which are outlined above.     ED Discussion     6:27 PM: Discussed case with VANNESSA Abdullahi ([facility]). Dr. Regalado ([facility]) agrees with current care and management of pt and accepts admission.   Admitting Service: [facility]  Admitting Physician: Dr. Regalado  Admit to: Med/Tele    6:43 PM: Re-evaluated pt. I have discussed test results, shared treatment plan, and the need for admission with patient at bedside. Pt expresses understanding at this time and agree with all information. All questions answered. Pt has no further questions or concerns at this time. Pt is ready  for admit.               Medical Decision Making:   Clinical Tests:   Lab Tests: Ordered and Reviewed  Radiological Study: Ordered and Reviewed  Medical Tests: Ordered and Reviewed           ED Medication(s):  Medications   sodium chloride 0.9% flush 10 mL (has no administration in time range)   glucose chewable tablet 16 g (has no administration in time range)   glucose chewable tablet 24 g (has no administration in time range)   dextrose 50% injection 12.5 g (has no administration in time range)   dextrose 50% injection 25 g (has no administration in time range)   glucagon (human recombinant) injection 1 mg (has no administration in time range)   heparin (porcine) injection 5,000 Units (5,000 Units Subcutaneous Given 8/1/20 2125)   0.9%  NaCl infusion ( Intravenous New Bag 8/1/20 2102)   pantoprazole injection 40 mg (40 mg Intravenous Given 8/1/20 1727)   lactated ringers bolus 1,000 mL (0 mLs Intravenous Stopped 8/1/20 2103)       Current Discharge Medication List                  Scribe Attestation:   Scribe #1: I performed the above scribed service and the documentation accurately describes the services I performed. I attest to the accuracy of the note.     Attending:   Physician Attestation Statement for Scribe #1: I, Romulo Harris MD, personally performed the services described in this documentation, as scribed by Wendi Herrera, in my presence, and it is both accurate and complete.           Clinical Impression       ICD-10-CM ICD-9-CM   1. COVID-19  U07.1    2. Chest pain  R07.9 786.50   3. CINTHIA (acute kidney injury)  N17.9 584.9       Disposition:   Disposition: Admitted  Condition: Serious         Romulo Harris MD  08/02/20 0254

## 2020-08-01 NOTE — ED NOTES
Pt c/o diarrhea, nausea, productive cough, and weakness x 4 days. Reports fever at home - Tmax 101.1. Denies fever, emesis, CP, SOB, HA, numbness, tingling, vision changes or any other symptoms at this time.     Patient identifiers verified and correct for Leslie Wood.    LOC: The patient is awake, alert and aware of environment with an appropriate affect, the patient is oriented x 3 and speaking appropriately.  APPEARANCE: Patient resting comfortably and in no acute distress, patient is clean and well groomed, patient's clothing is properly fastened.  SKIN: The skin is warm and dry, color consistent with ethnicity, patient has normal skin turgor and moist mucus membranes, skin intact, no breakdown or bruising noted.  MUSCULOSKELETAL: Patient moving all extremities spontaneously.  RESPIRATORY: Airway is open and patent, respirations are spontaneous.  CARDIAC: Patient has a normal rate, no peripheral edema noted, capillary refill < 3 seconds.  ABDOMEN: Soft and epigastric region tender to palpation.

## 2020-08-02 PROBLEM — N17.9 AKI (ACUTE KIDNEY INJURY): Status: ACTIVE | Noted: 2020-08-02

## 2020-08-02 LAB
ANION GAP SERPL CALC-SCNC: 13 MMOL/L (ref 8–16)
BASOPHILS # BLD AUTO: 0.01 K/UL (ref 0–0.2)
BASOPHILS NFR BLD: 0.2 % (ref 0–1.9)
BUN SERPL-MCNC: 49 MG/DL (ref 8–23)
CALCIUM SERPL-MCNC: 8.6 MG/DL (ref 8.7–10.5)
CHLORIDE SERPL-SCNC: 109 MMOL/L (ref 95–110)
CO2 SERPL-SCNC: 19 MMOL/L (ref 23–29)
CREAT SERPL-MCNC: 1.6 MG/DL (ref 0.5–1.4)
DIFFERENTIAL METHOD: ABNORMAL
EOSINOPHIL # BLD AUTO: 0 K/UL (ref 0–0.5)
EOSINOPHIL NFR BLD: 0.2 % (ref 0–8)
ERYTHROCYTE [DISTWIDTH] IN BLOOD BY AUTOMATED COUNT: 13.2 % (ref 11.5–14.5)
EST. GFR  (AFRICAN AMERICAN): 34 ML/MIN/1.73 M^2
EST. GFR  (NON AFRICAN AMERICAN): 30 ML/MIN/1.73 M^2
ESTIMATED AVG GLUCOSE: 117 MG/DL (ref 68–131)
GLUCOSE SERPL-MCNC: 85 MG/DL (ref 70–110)
HBA1C MFR BLD HPLC: 5.7 % (ref 4–5.6)
HCT VFR BLD AUTO: 39.7 % (ref 37–48.5)
HGB BLD-MCNC: 12.9 G/DL (ref 12–16)
IMM GRANULOCYTES # BLD AUTO: 0 K/UL (ref 0–0.04)
IMM GRANULOCYTES NFR BLD AUTO: 0 % (ref 0–0.5)
LYMPHOCYTES # BLD AUTO: 2.3 K/UL (ref 1–4.8)
LYMPHOCYTES NFR BLD: 53.4 % (ref 18–48)
MAGNESIUM SERPL-MCNC: 2 MG/DL (ref 1.6–2.6)
MCH RBC QN AUTO: 28.9 PG (ref 27–31)
MCHC RBC AUTO-ENTMCNC: 32.5 G/DL (ref 32–36)
MCV RBC AUTO: 89 FL (ref 82–98)
MONOCYTES # BLD AUTO: 0.8 K/UL (ref 0.3–1)
MONOCYTES NFR BLD: 18.3 % (ref 4–15)
NEUTROPHILS # BLD AUTO: 1.2 K/UL (ref 1.8–7.7)
NEUTROPHILS NFR BLD: 27.9 % (ref 38–73)
NRBC BLD-RTO: 0 /100 WBC
PHOSPHATE SERPL-MCNC: 3.1 MG/DL (ref 2.7–4.5)
PLATELET # BLD AUTO: 190 K/UL (ref 150–350)
PMV BLD AUTO: 10 FL (ref 9.2–12.9)
POCT GLUCOSE: 138 MG/DL (ref 70–110)
POCT GLUCOSE: 82 MG/DL (ref 70–110)
POCT GLUCOSE: 90 MG/DL (ref 70–110)
POCT GLUCOSE: 90 MG/DL (ref 70–110)
POTASSIUM SERPL-SCNC: 3.6 MMOL/L (ref 3.5–5.1)
RBC # BLD AUTO: 4.47 M/UL (ref 4–5.4)
SODIUM SERPL-SCNC: 141 MMOL/L (ref 136–145)
WBC # BLD AUTO: 4.27 K/UL (ref 3.9–12.7)

## 2020-08-02 PROCEDURE — 63700000 PHARM REV CODE 250 ALT 637 W/O HCPCS: Performed by: PHYSICIAN ASSISTANT

## 2020-08-02 PROCEDURE — 36415 COLL VENOUS BLD VENIPUNCTURE: CPT

## 2020-08-02 PROCEDURE — 80048 BASIC METABOLIC PNL TOTAL CA: CPT

## 2020-08-02 PROCEDURE — 25000003 PHARM REV CODE 250: Performed by: INTERNAL MEDICINE

## 2020-08-02 PROCEDURE — 21400001 HC TELEMETRY ROOM

## 2020-08-02 PROCEDURE — 25000003 PHARM REV CODE 250: Performed by: PHYSICIAN ASSISTANT

## 2020-08-02 PROCEDURE — 83735 ASSAY OF MAGNESIUM: CPT

## 2020-08-02 PROCEDURE — 85025 COMPLETE CBC W/AUTO DIFF WBC: CPT

## 2020-08-02 PROCEDURE — 94761 N-INVAS EAR/PLS OXIMETRY MLT: CPT

## 2020-08-02 PROCEDURE — 84100 ASSAY OF PHOSPHORUS: CPT

## 2020-08-02 PROCEDURE — 83036 HEMOGLOBIN GLYCOSYLATED A1C: CPT

## 2020-08-02 PROCEDURE — 63600175 PHARM REV CODE 636 W HCPCS: Performed by: PHYSICIAN ASSISTANT

## 2020-08-02 RX ORDER — CHOLESTYRAMINE 4 G/9G
1 POWDER, FOR SUSPENSION ORAL 2 TIMES DAILY
Status: DISCONTINUED | OUTPATIENT
Start: 2020-08-02 | End: 2020-08-04 | Stop reason: HOSPADM

## 2020-08-02 RX ORDER — LOSARTAN POTASSIUM 25 MG/1
25 TABLET ORAL 2 TIMES DAILY
Status: DISCONTINUED | OUTPATIENT
Start: 2020-08-02 | End: 2020-08-04

## 2020-08-02 RX ORDER — ZINC SULFATE 50(220)MG
220 CAPSULE ORAL DAILY
Status: DISCONTINUED | OUTPATIENT
Start: 2020-08-02 | End: 2020-08-04 | Stop reason: HOSPADM

## 2020-08-02 RX ORDER — AZITHROMYCIN 250 MG/1
500 TABLET, FILM COATED ORAL DAILY
Status: COMPLETED | OUTPATIENT
Start: 2020-08-02 | End: 2020-08-04

## 2020-08-02 RX ORDER — DIPHENOXYLATE HYDROCHLORIDE AND ATROPINE SULFATE 2.5; .025 MG/1; MG/1
1 TABLET ORAL 4 TIMES DAILY PRN
Status: DISCONTINUED | OUTPATIENT
Start: 2020-08-02 | End: 2020-08-04 | Stop reason: HOSPADM

## 2020-08-02 RX ORDER — ONDANSETRON 4 MG/1
4 TABLET, FILM COATED ORAL EVERY 8 HOURS PRN
Status: DISCONTINUED | OUTPATIENT
Start: 2020-08-02 | End: 2020-08-04 | Stop reason: HOSPADM

## 2020-08-02 RX ORDER — CARVEDILOL 6.25 MG/1
6.25 TABLET ORAL 2 TIMES DAILY
Status: DISCONTINUED | OUTPATIENT
Start: 2020-08-02 | End: 2020-08-04 | Stop reason: HOSPADM

## 2020-08-02 RX ORDER — INSULIN ASPART 100 [IU]/ML
0-5 INJECTION, SOLUTION INTRAVENOUS; SUBCUTANEOUS
Status: DISCONTINUED | OUTPATIENT
Start: 2020-08-02 | End: 2020-08-04 | Stop reason: HOSPADM

## 2020-08-02 RX ORDER — AZITHROMYCIN 250 MG/1
500 TABLET, FILM COATED ORAL DAILY
Status: DISCONTINUED | OUTPATIENT
Start: 2020-08-02 | End: 2020-08-02

## 2020-08-02 RX ORDER — TRAZODONE HYDROCHLORIDE 50 MG/1
50 TABLET ORAL NIGHTLY
Status: DISCONTINUED | OUTPATIENT
Start: 2020-08-02 | End: 2020-08-04 | Stop reason: HOSPADM

## 2020-08-02 RX ORDER — HYDROCODONE BITARTRATE AND ACETAMINOPHEN 10; 325 MG/1; MG/1
1 TABLET ORAL EVERY 8 HOURS PRN
Status: DISCONTINUED | OUTPATIENT
Start: 2020-08-02 | End: 2020-08-04 | Stop reason: HOSPADM

## 2020-08-02 RX ORDER — ACETAMINOPHEN 325 MG/1
650 TABLET ORAL EVERY 6 HOURS PRN
Status: DISCONTINUED | OUTPATIENT
Start: 2020-08-02 | End: 2020-08-04 | Stop reason: HOSPADM

## 2020-08-02 RX ORDER — TALC
6 POWDER (GRAM) TOPICAL NIGHTLY PRN
Status: DISCONTINUED | OUTPATIENT
Start: 2020-08-02 | End: 2020-08-04 | Stop reason: HOSPADM

## 2020-08-02 RX ORDER — ASCORBIC ACID 500 MG
1000 TABLET ORAL DAILY
Status: DISCONTINUED | OUTPATIENT
Start: 2020-08-02 | End: 2020-08-04 | Stop reason: HOSPADM

## 2020-08-02 RX ORDER — TIZANIDINE 4 MG/1
4 TABLET ORAL EVERY 6 HOURS PRN
Status: DISCONTINUED | OUTPATIENT
Start: 2020-08-02 | End: 2020-08-04 | Stop reason: HOSPADM

## 2020-08-02 RX ADMIN — HEPARIN SODIUM 5000 UNITS: 5000 INJECTION, SOLUTION INTRAVENOUS; SUBCUTANEOUS at 03:08

## 2020-08-02 RX ADMIN — Medication 220 MG: at 03:08

## 2020-08-02 RX ADMIN — TRAZODONE HYDROCHLORIDE 50 MG: 50 TABLET ORAL at 09:08

## 2020-08-02 RX ADMIN — CARVEDILOL 6.25 MG: 6.25 TABLET, FILM COATED ORAL at 08:08

## 2020-08-02 RX ADMIN — OXYCODONE HYDROCHLORIDE AND ACETAMINOPHEN 1000 MG: 500 TABLET ORAL at 03:08

## 2020-08-02 RX ADMIN — LOSARTAN POTASSIUM 25 MG: 25 TABLET, FILM COATED ORAL at 09:08

## 2020-08-02 RX ADMIN — DIPHENOXYLATE HYDROCHLORIDE AND ATROPINE SULFATE 1 TABLET: 2.5; .025 TABLET ORAL at 05:08

## 2020-08-02 RX ADMIN — AZITHROMYCIN MONOHYDRATE 500 MG: 250 TABLET ORAL at 05:08

## 2020-08-02 RX ADMIN — HEPARIN SODIUM 5000 UNITS: 5000 INJECTION, SOLUTION INTRAVENOUS; SUBCUTANEOUS at 09:08

## 2020-08-02 RX ADMIN — SALINE NASAL SPRAY 1 SPRAY: 1.5 SOLUTION NASAL at 03:08

## 2020-08-02 RX ADMIN — HEPARIN SODIUM 5000 UNITS: 5000 INJECTION, SOLUTION INTRAVENOUS; SUBCUTANEOUS at 05:08

## 2020-08-02 RX ADMIN — SALINE NASAL SPRAY 1 SPRAY: 1.5 SOLUTION NASAL at 09:08

## 2020-08-02 RX ADMIN — CHOLESTYRAMINE 4 G: 4 POWDER, FOR SUSPENSION ORAL at 09:08

## 2020-08-02 RX ADMIN — TRAZODONE HYDROCHLORIDE 50 MG: 50 TABLET ORAL at 05:08

## 2020-08-02 RX ADMIN — LOSARTAN POTASSIUM 25 MG: 25 TABLET, FILM COATED ORAL at 08:08

## 2020-08-02 RX ADMIN — SODIUM CHLORIDE: 0.9 INJECTION, SOLUTION INTRAVENOUS at 05:08

## 2020-08-02 NOTE — ASSESSMENT & PLAN NOTE
-Likely due to diarrhea and decreased oral intake.   -Will cautiously hydrate in the setting of COVID and monitor.

## 2020-08-02 NOTE — PLAN OF CARE
Patient continues to do well with plan of care. Patient remains on room air. Patient reports feeling better today and is having less GI distress. No need to cover with SSI for blood glucose. Lab work improved with gentle hydration. Patient is hopeful to discharge tomorrow. No significant events during shift

## 2020-08-02 NOTE — PLAN OF CARE
SW spoke with pt by phone to obtain necessary information to complete assessment. Patient is independent with ADLs/IADLs. Pt reports that she lives at home with SP and dtr. 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 Pt is currently experiencing weakness during hospitalization, but feels confident that she will be able to manage her own care needs post d/c. Patient denies any post hospital needs or services at this time. SW instructed patient to call with any questions or concerns.    Preferred Pharmacy:   [facility]  [address], [place] [address]    D/C Plan: Home with family  PCP: Angeles Carson MD  Discharge transportation: Family will provide  Pharmacy Bedside Delivery: Decline     08/02/20 0914   Discharge Assessment   Assessment Type Discharge Planning Assessment   Confirmed/corrected address and phone number on facesheet? Yes   Assessment information obtained from? Patient   Expected Length of Stay (days)   (TBD)   Communicated expected length of stay with patient/caregiver yes   Prior to hospitilization cognitive status: Alert/Oriented   Prior to hospitalization functional status: Independent   Current cognitive status: Alert/Oriented   Current Functional Status: Needs Assistance;Independent   Lives With spouse;child(godwin), adult   Able to Return to Prior Arrangements yes   Is patient able to care for self after discharge? Yes   Who are your caregiver(s) and their phone number(s)? Vicki Puentes (dtr) [phone]   Patient's perception of discharge disposition home or selfcare   Readmission Within the Last 30 Days no previous admission in last 30 days   Patient currently being followed by outpatient case management? No   Patient currently receives any other outside agency services? No   Equipment Currently Used at Home none   Do you have any problems affording any of your prescribed medications? No   Is the patient taking  medications as prescribed? yes   Does the patient have transportation home? Yes   Transportation Anticipated family or friend will provide   Does the patient receive services at the Coumadin Clinic? No   Discharge Plan A Home with family   Discharge Plan B Home with family   DME Needed Upon Discharge  none   Patient/Family in Agreement with Plan yes

## 2020-08-02 NOTE — PLAN OF CARE
POC reviewed with pt, verbalized understanding. Pt remained free from falls, fall precautions in place. Pt is NSR on monitor. VSS. No other c/o at this time. PIV intact, infusing NS. Call bell and personal belongings within reach. Hourly rounding complete. Reminded to call for assistance. Will continue to monitor.

## 2020-08-02 NOTE — SIGNIFICANT EVENT
79 y/o wf admitted with a dx of COVID-19 infection  And CINTHIA . She is on room air at this  Time  .  She is only experience GI sx at this time . She is complaining of severe diarrhea  Associated with nauseas . Today she is tolerating po intake . She was started don IVF  With an improvement of  The kidney function.  Start  PO Questran  And decrease IVF to 75 cc/hr

## 2020-08-02 NOTE — SUBJECTIVE & OBJECTIVE
Past Medical History:   Diagnosis Date    Arthritis     Cataract     GERD (gastroesophageal reflux disease)     Hypertension        Past Surgical History:   Procedure Laterality Date    ADENOIDECTOMY      ADRENAL GLAND SURGERY      APPENDECTOMY      BACK SURGERY      fusion l 4-5 s 1,2,3  fusion l 2-3    EYE SURGERY      HEMORRHOID SURGERY      HERNIA REPAIR      HYSTERECTOMY      indirect lumbar decompression      percutaneous placement of interspinous extension blocker    TONSILLECTOMY         Review of patient's allergies indicates:   Allergen Reactions    Amitriptyline     Hydrochlorothiazide      Causes muscle cramping    Lisinopril      hyperkalemia    Percocet [oxycodone-acetaminophen] Other (See Comments)     Seizures    Codeine Nausea Only and Rash       No current facility-administered medications on file prior to encounter.      Current Outpatient Medications on File Prior to Encounter   Medication Sig    carvediloL (COREG) 6.25 MG tablet TAKE (1) TABLET BY MOUTH 2 TIMES A DAY WITH MEALS    hydrocodone-acetaminophen 10-325mg (NORCO)  mg Tab     Lactobac no.41/Bifidobact no.7 (PROBIOTIC-10 ORAL) Take by mouth.    tizanidine (ZANAFLEX) 4 MG tablet Take 1 tablet (4 mg total) by mouth every 6 (six) hours as needed (HOLD while on CIPRO).    traZODone (DESYREL) 50 MG tablet TAKE 1 TABLET BY MOUTH NIGHTLY AS NEEDED FOR INSOMNIA    UNABLE TO FIND Tumeric root extract    valsartan (DIOVAN) 160 MG tablet Take 0.5 tablets (80 mg total) by mouth 2 (two) times daily.    vitamin D (VITAMIN D3) 1000 units Tab Take 1,000 Units by mouth.    amLODIPine (NORVASC) 5 MG tablet Take 1 tablet (5 mg total) by mouth once daily. (Patient not taking: Reported on 1/24/2020)    hydrALAZINE (APRESOLINE) 10 MG tablet Take 1 tablet (10 mg total) by mouth every 12 (twelve) hours.    hydroCHLOROthiazide (HYDRODIURIL) 12.5 MG Tab Take 1 tablet (12.5 mg total) by mouth once daily.     Family History      Problem Relation (Age of Onset)    Diabetes Mother    Heart disease Mother    Hypertension Mother, Father    Kidney disease Father        Tobacco Use    Smoking status: Never Smoker    Smokeless tobacco: Never Used   Substance and Sexual Activity    Alcohol use: No    Drug use: No    Sexual activity: Not Currently     Review of Systems   Constitutional: Positive for appetite change (decrease) and fever. Negative for chills, diaphoresis and fatigue.   HENT: Positive for congestion. Negative for ear pain, mouth sores, sore throat and trouble swallowing.    Eyes: Negative for pain and visual disturbance.   Respiratory: Positive for shortness of breath. Negative for cough and chest tightness.    Cardiovascular: Negative for chest pain, palpitations and leg swelling.   Gastrointestinal: Positive for abdominal pain, diarrhea and nausea. Negative for constipation.   Endocrine: Negative for cold intolerance, heat intolerance, polydipsia and polyuria.   Genitourinary: Negative for dysuria, frequency and hematuria.   Musculoskeletal: Negative for arthralgias, back pain, myalgias and neck pain.   Skin: Negative for pallor, rash and wound.   Allergic/Immunologic: Negative for environmental allergies and immunocompromised state.   Neurological: Negative for dizziness, seizures, syncope, weakness, numbness and headaches.   Hematological: Negative for adenopathy. Does not bruise/bleed easily.   Psychiatric/Behavioral: Negative for agitation, confusion and sleep disturbance.     Objective:     Vital Signs (Most Recent):  Temp: 97.5 °F (36.4 °C) (08/02/20 0313)  Pulse: 62 (08/02/20 0313)  Resp: 16 (08/02/20 0313)  BP: (!) 136/92 (08/02/20 0313)  SpO2: 96 % (08/02/20 0313) Vital Signs (24h Range):  Temp:  [97.5 °F (36.4 °C)-98.2 °F (36.8 °C)] 97.5 °F (36.4 °C)  Pulse:  [56-66] 62  Resp:  [16-20] 16  SpO2:  [94 %-97 %] 96 %  BP: ()/(53-92) 136/92     Weight: 68 kg (149 lb 14.6 oz)  Body mass index is 28.33  kg/m².    Physical Exam  Vitals signs and nursing note reviewed.   Constitutional:       General: She is not in acute distress.     Appearance: She is well-developed.   HENT:      Head: Normocephalic and atraumatic.   Eyes:      Conjunctiva/sclera: Conjunctivae normal.      Comments: PERRL   Neck:      Musculoskeletal: Neck supple.      Vascular: No JVD.   Cardiovascular:      Rate and Rhythm: Normal rate and regular rhythm.      Heart sounds: Normal heart sounds.   Pulmonary:      Effort: Pulmonary effort is normal.      Breath sounds: Normal breath sounds.   Abdominal:      General: Bowel sounds are normal. There is no distension.      Palpations: Abdomen is soft.      Tenderness: There is abdominal tenderness (diffuse).   Musculoskeletal: Normal range of motion.   Skin:     General: Skin is warm and dry.   Neurological:      Mental Status: She is alert and oriented to person, place, and time.   Psychiatric:         Behavior: Behavior normal.         Thought Content: Thought content normal.             Significant Labs:   CBC:   Recent Labs   Lab 08/01/20  1730   WBC 5.23   HGB 14.7   HCT 45.8        CMP:   Recent Labs   Lab 08/01/20  1730      K 3.7      CO2 21*      BUN 62*   CREATININE 3.0*   CALCIUM 9.2   PROT 7.5   ALBUMIN 3.9   BILITOT 0.3   ALKPHOS 53*   AST 25   ALT 22   ANIONGAP 16   EGFRNONAA 14*     All pertinent labs within the past 24 hours have been reviewed.    Significant Imaging: I have reviewed all pertinent imaging results/findings within the past 24 hours.   Imaging Results          X-Ray Chest AP Portable (Final result)  Result time 08/01/20 17:47:22    Final result by BHARATI Neff Sr., MD (08/01/20 17:47:22)                 Impression:      1. There is no evidence of an acute pulmonary process.  2. There is persistent silhouetting of the inferior aspect of the left border of the heart.  This is characteristic of a pericardial fat pad.  3. There is marked  narrowing of the right subacromial space.  This is characteristic of a rotator cuff tear.  4. Surgical changes  .      Electronically signed by: Luis Neff MD  Date:    08/01/2020  Time:    17:47             Narrative:    EXAMINATION:  XR CHEST AP PORTABLE    CLINICAL HISTORY:  Chest Pain;    COMPARISON:  05/11/2017    FINDINGS:  There is persistent silhouetting of the inferior aspect of the left border of the heart.  There is no evidence of an acute pulmonary process.  There is no pneumothorax.  The costophrenic angles are sharp.  There are 2 electrodes projected over the thoracic spine.  There are surgical clips projected over the left upper quadrant of the abdomen.  There is marked narrowing of the right subacromial space.

## 2020-08-02 NOTE — HPI
Leslie Wood is an 80 year old female who presented to the ED with complaints of chest congestion. Symptoms started approximately 6 days ago when she began having diarrhea. Approximately two days later, she noted fever as high as 101. She became concerned when she noted chest congestion and mild shortness of breath. There is associated nausea, decreased appetite and abdominal tenderness. She denies known COVID contacts. She works, but is alone in her office and leaves via her own entry. She live with her  who is not ill. In the ED, the patient's COVID test was positive. Her creatinine was elevated to 3 from a previous measurement of 0.9 on 12/13/19. Other labs were essentially negative. Code status was discussed with the patient. She is a full code. Her daughter, Vicki Puentes, is her surrogate medical decision maker.

## 2020-08-02 NOTE — H&P
[facility] Medicine  History & Physical    Patient Name: Leslie Wood  MRN: [id number]  Admission Date: 8/1/2020  Attending Physician: No att. providers found   Primary Care Provider: Angeles Carson MD         Patient information was obtained from patient, past medical records and ER records.     Subjective:     Principal Problem:CINTHIA (acute kidney injury)    Chief Complaint:   Chief Complaint   Patient presents with    Chest Congestion     x 4 days, +cough with yellowish gray sputum, +diarrhea x 4 days, fever was 100.1,         HPI: Leslie Wood is an 80 year old female who presented to the ED with complaints of chest congestion. Symptoms started approximately 6 days ago when she began having diarrhea. Approximately two days later, she noted fever as high as 101. She became concerned when she noted chest congestion and mild shortness of breath. There is associated nausea, decreased appetite and abdominal tenderness. She denies known COVID contacts. She works, but is alone in her office and leaves via her own entry. She live with her  who is not ill. In the ED, the patient's COVID test was positive. Her creatinine was elevated to 3 from a previous measurement of 0.9 on 12/13/19. Other labs were essentially negative. Code status was discussed with the patient. She is a full code. Her daughter, Vicki Puentes, is her surrogate medical decision maker.     Past Medical History:   Diagnosis Date    Arthritis     Cataract     GERD (gastroesophageal reflux disease)     Hypertension        Past Surgical History:   Procedure Laterality Date    ADENOIDECTOMY      ADRENAL GLAND SURGERY      APPENDECTOMY      BACK SURGERY      fusion l 4-5 s 1,2,3  fusion l 2-3    EYE SURGERY      HEMORRHOID SURGERY      HERNIA REPAIR      HYSTERECTOMY      indirect lumbar decompression      percutaneous placement of interspinous extension blocker    TONSILLECTOMY         Review of patient's  allergies indicates:   Allergen Reactions    Amitriptyline     Hydrochlorothiazide      Causes muscle cramping    Lisinopril      hyperkalemia    Percocet [oxycodone-acetaminophen] Other (See Comments)     Seizures    Codeine Nausea Only and Rash       No current facility-administered medications on file prior to encounter.      Current Outpatient Medications on File Prior to Encounter   Medication Sig    carvediloL (COREG) 6.25 MG tablet TAKE (1) TABLET BY MOUTH 2 TIMES A DAY WITH MEALS    hydrocodone-acetaminophen 10-325mg (NORCO)  mg Tab     Lactobac no.41/Bifidobact no.7 (PROBIOTIC-10 ORAL) Take by mouth.    tizanidine (ZANAFLEX) 4 MG tablet Take 1 tablet (4 mg total) by mouth every 6 (six) hours as needed (HOLD while on CIPRO).    traZODone (DESYREL) 50 MG tablet TAKE 1 TABLET BY MOUTH NIGHTLY AS NEEDED FOR INSOMNIA    UNABLE TO FIND Tumeric root extract    valsartan (DIOVAN) 160 MG tablet Take 0.5 tablets (80 mg total) by mouth 2 (two) times daily.    vitamin D (VITAMIN D3) 1000 units Tab Take 1,000 Units by mouth.    amLODIPine (NORVASC) 5 MG tablet Take 1 tablet (5 mg total) by mouth once daily. (Patient not taking: Reported on 1/24/2020)    hydrALAZINE (APRESOLINE) 10 MG tablet Take 1 tablet (10 mg total) by mouth every 12 (twelve) hours.    hydroCHLOROthiazide (HYDRODIURIL) 12.5 MG Tab Take 1 tablet (12.5 mg total) by mouth once daily.     Family History     Problem Relation (Age of Onset)    Diabetes Mother    Heart disease Mother    Hypertension Mother, Father    Kidney disease Father        Tobacco Use    Smoking status: Never Smoker    Smokeless tobacco: Never Used   Substance and Sexual Activity    Alcohol use: No    Drug use: No    Sexual activity: Not Currently     Review of Systems   Constitutional: Positive for appetite change (decrease) and fever. Negative for chills, diaphoresis and fatigue.   HENT: Positive for congestion. Negative for ear pain, mouth sores, sore  throat and trouble swallowing.    Eyes: Negative for pain and visual disturbance.   Respiratory: Positive for shortness of breath. Negative for cough and chest tightness.    Cardiovascular: Negative for chest pain, palpitations and leg swelling.   Gastrointestinal: Positive for abdominal pain, diarrhea and nausea. Negative for constipation.   Endocrine: Negative for cold intolerance, heat intolerance, polydipsia and polyuria.   Genitourinary: Negative for dysuria, frequency and hematuria.   Musculoskeletal: Negative for arthralgias, back pain, myalgias and neck pain.   Skin: Negative for pallor, rash and wound.   Allergic/Immunologic: Negative for environmental allergies and immunocompromised state.   Neurological: Negative for dizziness, seizures, syncope, weakness, numbness and headaches.   Hematological: Negative for adenopathy. Does not bruise/bleed easily.   Psychiatric/Behavioral: Negative for agitation, confusion and sleep disturbance.     Objective:     Vital Signs (Most Recent):  Temp: 97.5 °F (36.4 °C) (08/02/20 0313)  Pulse: 62 (08/02/20 0313)  Resp: 16 (08/02/20 0313)  BP: (!) 136/92 (08/02/20 0313)  SpO2: 96 % (08/02/20 0313) Vital Signs (24h Range):  Temp:  [97.5 °F (36.4 °C)-98.2 °F (36.8 °C)] 97.5 °F (36.4 °C)  Pulse:  [56-66] 62  Resp:  [16-20] 16  SpO2:  [94 %-97 %] 96 %  BP: ()/(53-92) 136/92     Weight: 68 kg (149 lb 14.6 oz)  Body mass index is 28.33 kg/m².    Physical Exam  Vitals signs and nursing note reviewed.   Constitutional:       General: She is not in acute distress.     Appearance: She is well-developed.   HENT:      Head: Normocephalic and atraumatic.   Eyes:      Conjunctiva/sclera: Conjunctivae normal.      Comments: PERRL   Neck:      Musculoskeletal: Neck supple.      Vascular: No JVD.   Cardiovascular:      Rate and Rhythm: Normal rate and regular rhythm.      Heart sounds: Normal heart sounds.   Pulmonary:      Effort: Pulmonary effort is normal.      Breath sounds: Normal  breath sounds.   Abdominal:      General: Bowel sounds are normal. There is no distension.      Palpations: Abdomen is soft.      Tenderness: There is abdominal tenderness (diffuse).   Musculoskeletal: Normal range of motion.   Skin:     General: Skin is warm and dry.   Neurological:      Mental Status: She is alert and oriented to person, place, and time.   Psychiatric:         Behavior: Behavior normal.         Thought Content: Thought content normal.             Significant Labs:   CBC:   Recent Labs   Lab 08/01/20  1730   WBC 5.23   HGB 14.7   HCT 45.8        CMP:   Recent Labs   Lab 08/01/20  1730      K 3.7      CO2 21*      BUN 62*   CREATININE 3.0*   CALCIUM 9.2   PROT 7.5   ALBUMIN 3.9   BILITOT 0.3   ALKPHOS 53*   AST 25   ALT 22   ANIONGAP 16   EGFRNONAA 14*     All pertinent labs within the past 24 hours have been reviewed.    Significant Imaging: I have reviewed all pertinent imaging results/findings within the past 24 hours.   Imaging Results          X-Ray Chest AP Portable (Final result)  Result time 08/01/20 17:47:22    Final result by BHARATI Neff Sr., MD (08/01/20 17:47:22)                 Impression:      1. There is no evidence of an acute pulmonary process.  2. There is persistent silhouetting of the inferior aspect of the left border of the heart.  This is characteristic of a pericardial fat pad.  3. There is marked narrowing of the right subacromial space.  This is characteristic of a rotator cuff tear.  4. Surgical changes  .      Electronically signed by: Luis Neff MD  Date:    08/01/2020  Time:    17:47             Narrative:    EXAMINATION:  XR CHEST AP PORTABLE    CLINICAL HISTORY:  Chest Pain;    COMPARISON:  05/11/2017    FINDINGS:  There is persistent silhouetting of the inferior aspect of the left border of the heart.  There is no evidence of an acute pulmonary process.  There is no pneumothorax.  The costophrenic angles are sharp.  There are 2  electrodes projected over the thoracic spine.  There are surgical clips projected over the left upper quadrant of the abdomen.  There is marked narrowing of the right subacromial space.                                  Assessment/Plan:     * CINTHIA (acute kidney injury)  -Likely due to diarrhea and decreased oral intake.   -Will cautiously hydrate in the setting of COVID and monitor.       COVID-19  - COVID-19 testing   - Infection Control notified     - Isolation:   - Airborne, Contact and Droplet Precautions  - Cohort patients into COVID units  - N95 mask, wear eye protection  - 20 second hand hygiene              - Limit visitors per hospital policy              - Consolidating lab draws, nursing care, provider visits, and interventions      - Management:  Supplemental O2 to maintain SpO2 >92%  Telemetry  Continuous/intermittent Pulse Ox   Empiric antibiotics  No indication for steroids at this time    Advance Care Planning   Full code               Type 2 diabetes mellitus without complication, without long-term current use of insulin  -NISS.   -ADA diet.   -Check hemoglobin A1C.         Essential hypertension  Continue amlodipine and Coreg.         VTE Risk Mitigation (From admission, onward)         Ordered     heparin (porcine) injection 5,000 Units  Every 8 hours      08/01/20 1842     IP VTE HIGH RISK PATIENT  Once      08/01/20 1842     Place sequential compression device  Until discontinued      08/01/20 1842                   VANNESSA Abdullahi  Department of Hospital Medicine   [facility]

## 2020-08-02 NOTE — NURSING
Patient arrived to  bed 226  from ED accompanied by nurse. Transferred to bed. Heart monitor in place, vital signs taken.   Patient is aaox4 with no distress noted.  Assessment as charted.   Patient has been orientated to room, call light, fall precautions, and board. No questions or concerns at this time. Will continue to monitor.

## 2020-08-02 NOTE — ASSESSMENT & PLAN NOTE
- COVID-19 testing   - Infection Control notified     - Isolation:   - Airborne, Contact and Droplet Precautions  - Cohort patients into COVID units  - N95 mask, wear eye protection  - 20 second hand hygiene              - Limit visitors per hospital policy              - Consolidating lab draws, nursing care, provider visits, and interventions      - Management:  Supplemental O2 to maintain SpO2 >92%  Telemetry  Continuous/intermittent Pulse Ox   Empiric antibiotics  No indication for steroids at this time    Advance Care Planning   Full code

## 2020-08-03 PROBLEM — R78.81 GRAM-POSITIVE BACTEREMIA: Status: ACTIVE | Noted: 2020-08-03

## 2020-08-03 LAB
ANION GAP SERPL CALC-SCNC: 8 MMOL/L (ref 8–16)
BUN SERPL-MCNC: 22 MG/DL (ref 8–23)
CALCIUM SERPL-MCNC: 8.1 MG/DL (ref 8.7–10.5)
CHLORIDE SERPL-SCNC: 112 MMOL/L (ref 95–110)
CO2 SERPL-SCNC: 21 MMOL/L (ref 23–29)
CREAT SERPL-MCNC: 0.8 MG/DL (ref 0.5–1.4)
D DIMER PPP IA.FEU-MCNC: 0.48 MG/L FEU
EST. GFR  (AFRICAN AMERICAN): >60 ML/MIN/1.73 M^2
EST. GFR  (NON AFRICAN AMERICAN): >60 ML/MIN/1.73 M^2
GLUCOSE SERPL-MCNC: 177 MG/DL (ref 70–110)
LDH SERPL L TO P-CCNC: 172 U/L (ref 110–260)
MAGNESIUM SERPL-MCNC: 1.6 MG/DL (ref 1.6–2.6)
PHOSPHATE SERPL-MCNC: 1.4 MG/DL (ref 2.7–4.5)
POCT GLUCOSE: 88 MG/DL (ref 70–110)
POCT GLUCOSE: 96 MG/DL (ref 70–110)
POCT GLUCOSE: 97 MG/DL (ref 70–110)
POCT GLUCOSE: 99 MG/DL (ref 70–110)
POTASSIUM SERPL-SCNC: 3.6 MMOL/L (ref 3.5–5.1)
SODIUM SERPL-SCNC: 141 MMOL/L (ref 136–145)

## 2020-08-03 PROCEDURE — 63700000 PHARM REV CODE 250 ALT 637 W/O HCPCS: Performed by: PHYSICIAN ASSISTANT

## 2020-08-03 PROCEDURE — 21400001 HC TELEMETRY ROOM

## 2020-08-03 PROCEDURE — 63600175 PHARM REV CODE 636 W HCPCS: Performed by: INTERNAL MEDICINE

## 2020-08-03 PROCEDURE — 84100 ASSAY OF PHOSPHORUS: CPT

## 2020-08-03 PROCEDURE — 63600175 PHARM REV CODE 636 W HCPCS: Performed by: PHYSICIAN ASSISTANT

## 2020-08-03 PROCEDURE — 83735 ASSAY OF MAGNESIUM: CPT

## 2020-08-03 PROCEDURE — 80048 BASIC METABOLIC PNL TOTAL CA: CPT

## 2020-08-03 PROCEDURE — 25000003 PHARM REV CODE 250: Performed by: INTERNAL MEDICINE

## 2020-08-03 PROCEDURE — 25000003 PHARM REV CODE 250: Performed by: PHYSICIAN ASSISTANT

## 2020-08-03 PROCEDURE — 36415 COLL VENOUS BLD VENIPUNCTURE: CPT

## 2020-08-03 PROCEDURE — 83615 LACTATE (LD) (LDH) ENZYME: CPT

## 2020-08-03 PROCEDURE — 87040 BLOOD CULTURE FOR BACTERIA: CPT | Mod: 59

## 2020-08-03 PROCEDURE — 85379 FIBRIN DEGRADATION QUANT: CPT

## 2020-08-03 RX ORDER — VANCOMYCIN HCL IN 5 % DEXTROSE 1G/250ML
1000 PLASTIC BAG, INJECTION (ML) INTRAVENOUS
Status: DISCONTINUED | OUTPATIENT
Start: 2020-08-04 | End: 2020-08-04

## 2020-08-03 RX ADMIN — TRAZODONE HYDROCHLORIDE 50 MG: 50 TABLET ORAL at 10:08

## 2020-08-03 RX ADMIN — SODIUM CHLORIDE: 0.9 INJECTION, SOLUTION INTRAVENOUS at 10:08

## 2020-08-03 RX ADMIN — SODIUM CHLORIDE: 0.9 INJECTION, SOLUTION INTRAVENOUS at 05:08

## 2020-08-03 RX ADMIN — CARVEDILOL 6.25 MG: 6.25 TABLET, FILM COATED ORAL at 10:08

## 2020-08-03 RX ADMIN — CARVEDILOL 6.25 MG: 6.25 TABLET, FILM COATED ORAL at 09:08

## 2020-08-03 RX ADMIN — CEFTRIAXONE 1 G: 1 INJECTION, SOLUTION INTRAVENOUS at 02:08

## 2020-08-03 RX ADMIN — Medication 220 MG: at 09:08

## 2020-08-03 RX ADMIN — HEPARIN SODIUM 5000 UNITS: 5000 INJECTION, SOLUTION INTRAVENOUS; SUBCUTANEOUS at 05:08

## 2020-08-03 RX ADMIN — VANCOMYCIN HYDROCHLORIDE 1750 MG: 750 INJECTION, POWDER, LYOPHILIZED, FOR SOLUTION INTRAVENOUS at 05:08

## 2020-08-03 RX ADMIN — HEPARIN SODIUM 5000 UNITS: 5000 INJECTION, SOLUTION INTRAVENOUS; SUBCUTANEOUS at 02:08

## 2020-08-03 RX ADMIN — AZITHROMYCIN MONOHYDRATE 500 MG: 250 TABLET ORAL at 09:08

## 2020-08-03 RX ADMIN — CHOLESTYRAMINE 4 G: 4 POWDER, FOR SUSPENSION ORAL at 09:08

## 2020-08-03 RX ADMIN — SALINE NASAL SPRAY 1 SPRAY: 1.5 SOLUTION NASAL at 10:08

## 2020-08-03 RX ADMIN — SALINE NASAL SPRAY 1 SPRAY: 1.5 SOLUTION NASAL at 09:08

## 2020-08-03 RX ADMIN — OXYCODONE HYDROCHLORIDE AND ACETAMINOPHEN 1000 MG: 500 TABLET ORAL at 09:08

## 2020-08-03 RX ADMIN — SALINE NASAL SPRAY 1 SPRAY: 1.5 SOLUTION NASAL at 02:08

## 2020-08-03 RX ADMIN — LOSARTAN POTASSIUM 25 MG: 25 TABLET, FILM COATED ORAL at 10:08

## 2020-08-03 RX ADMIN — LOSARTAN POTASSIUM 25 MG: 25 TABLET, FILM COATED ORAL at 09:08

## 2020-08-03 NOTE — PLAN OF CARE
POC reviewed, verbalized understanding. Pt remained free from falls, fall precautions in place. Pt is NSR-SB on monitor. VSS. Blood glucose monitored. No coverage with insulin needed. No other c/o at this time. PIV intact, infusing NS. Call bell and personal belongings within reach. Hourly rounding complete. Reminded to call for assistance. Will continue to monitor.

## 2020-08-03 NOTE — PROGRESS NOTES
Pharmacokinetic Initial Assessment: IV Vancomycin    Assessment/Plan:    Initiate intravenous vancomycin with loading dose of 1750 mg once with subsequent doses when random concentrations are less than 20 mcg/mL  Desired empiric serum trough concentration is 15 to 20 mcg/mL  Draw vancomycin random level on 8/4 at 04:30.  Pharmacy will continue to follow and monitor vancomycin.      Please contact pharmacy at extension [phone] with any questions regarding this assessment.     Thank you for the consult,   Shar Garrett       Patient brief summary:  Leslie Wood is a 80 y.o. female initiated on antimicrobial therapy with IV Vancomycin for treatment of suspected bacteremia    Drug Allergies:   Review of patient's allergies indicates:   Allergen Reactions    Amitriptyline     Hydrochlorothiazide      Causes muscle cramping    Lisinopril      hyperkalemia    Percocet [oxycodone-acetaminophen] Other (See Comments)     Seizures    Codeine Nausea Only and Rash       Actual Body Weight:   68kg    Renal Function:   Estimated Creatinine Clearance: 23.9 mL/min (A) (based on SCr of 1.6 mg/dL (H)).,     Dialysis Method (if applicable):  N/A    CBC (last 72 hours):  Recent Labs   Lab Result Units 08/01/20 1730 08/02/20  0545   WBC K/uL 5.23 4.27   Hemoglobin g/dL 14.7 12.9   Hemoglobin A1C %  --  5.7*   Hematocrit % 45.8 39.7   Platelets K/uL 237 190   Gran% % 38.3 27.9*   Lymph% % 42.8 53.4*   Mono% % 18.5* 18.3*   Eosinophil% % 0.0 0.2   Basophil% % 0.2 0.2   Differential Method  Automated Automated       Metabolic Panel (last 72 hours):  Recent Labs   Lab Result Units 08/01/20 1730 08/01/20  1910 08/02/20  0545   Sodium mmol/L 140  --  141   Potassium mmol/L 3.7  --  3.6   Chloride mmol/L 103  --  109   CO2 mmol/L 21*  --  19*   Glucose mg/dL 104  --  85   Glucose, UA   --  Negative  --    BUN, Bld mg/dL 62*  --  49*   Creatinine mg/dL 3.0*  --  1.6*   Albumin g/dL 3.9  --   --    Total Bilirubin mg/dL 0.3  --   --     Alkaline Phosphatase U/L 53*  --   --    AST U/L 25  --   --    ALT U/L 22  --   --    Magnesium mg/dL 2.3  --  2.0   Phosphorus mg/dL 5.3*  --  3.1       Drug levels (last 3 results):  No results for input(s): VANCOMYCINRA, VANCOMYCINPE, VANCOMYCINTR in the last 72 hours.    Microbiologic Results:  Microbiology Results (last 7 days)       Procedure Component Value Units Date/Time    Blood culture (site 2) [[id number]] Collected: 08/01/20 2015    Order Status: Completed Specimen: Blood from Peripheral, Antecubital, Right Updated: 08/03/20 0052     Blood Culture, Routine Gram stain aer bottle: Gram positive cocci in clusters resembling Staph       Results called to and read back by:Delaney Bradley RN 08/03/2020  00:52    Narrative:      Site # 2, aerobic only    Blood culture (site 1) [[id number]] Collected: 08/01/20 1916    Order Status: Completed Specimen: Blood from Peripheral, Antecubital, Right Updated: 08/02/20 1426     Blood Culture, Routine No growth to date    Narrative:      Site # 1, aerobic and anaerobic

## 2020-08-03 NOTE — PROGRESS NOTES
Pharmacokinetic Assessment Follow Up: IV Vancomycin    Renal Function:   Estimated Creatinine Clearance: 47.8 mL/min (based on SCr of 0.8 mg/dL)., which is significantly improved from Estimated Creatinine Clearance: 23.9 mL/min (A) (based on SCr of 1.6 mg/dL (H)).    Vancomycin Regimen Plan:    Change regimen to Vancomycin 1000 mg IV every 24 hours with next serum trough concentration measured at 0430 prior to 3rd dose on 8/5/20.     Drug levels (last 3 results):  No results for input(s): VANCOMYCINRA, VANCOMYCINPE, VANCOMYCINTR, VANCOTROUGH in the last 72 hours.    Pharmacy will continue to follow and monitor vancomycin.    Please contact pharmacy at extension [phone] for questions regarding this assessment.    Thank you for the consult,   Megan Castro PharmD       Patient brief summary:  Leslie Wood is a 80 y.o. female initiated on antimicrobial therapy with IV Vancomycin for treatment of bacteremia    The patient's current regimen is pulse dosing (dosing by level) when random level is less than 20 mcg/mL.    Drug Allergies:   Review of patient's allergies indicates:   Allergen Reactions    Amitriptyline     Hydrochlorothiazide      Causes muscle cramping    Lisinopril      hyperkalemia    Percocet [oxycodone-acetaminophen] Other (See Comments)     Seizures    Codeine Nausea Only and Rash     Actual Body Weight:   68 kg    Renal Function:   Estimated Creatinine Clearance: 47.8 mL/min (based on SCr of 0.8 mg/dL).,     Dialysis Method (if applicable):  N/A    CBC (last 72 hours):  Recent Labs   Lab Result Units 08/01/20  1730 08/02/20  0545   WBC K/uL 5.23 4.27   Hemoglobin g/dL 14.7 12.9   Hemoglobin A1C %  --  5.7*   Hematocrit % 45.8 39.7   Platelets K/uL 237 190   Gran% % 38.3 27.9*   Lymph% % 42.8 53.4*   Mono% % 18.5* 18.3*   Eosinophil% % 0.0 0.2   Basophil% % 0.2 0.2   Differential Method  Automated Automated       Metabolic Panel (last 72 hours):  Recent Labs   Lab Result Units 08/01/20  1730  08/01/20 1910 08/02/20  0545 08/03/20  0719   Sodium mmol/L 140  --  141 141   Potassium mmol/L 3.7  --  3.6 3.6   Chloride mmol/L 103  --  109 112*   CO2 mmol/L 21*  --  19* 21*   Glucose mg/dL 104  --  85 177*   Glucose, UA   --  Negative  --   --    BUN, Bld mg/dL 62*  --  49* 22   Creatinine mg/dL 3.0*  --  1.6* 0.8   Albumin g/dL 3.9  --   --   --    Total Bilirubin mg/dL 0.3  --   --   --    Alkaline Phosphatase U/L 53*  --   --   --    AST U/L 25  --   --   --    ALT U/L 22  --   --   --    Magnesium mg/dL 2.3  --  2.0 1.6   Phosphorus mg/dL 5.3*  --  3.1 1.4*       Vancomycin Administrations:  vancomycin given in the last 96 hours                   vancomycin 1.75 g in 5 % dextrose 500 mL IVPB (mg) 1,750 mg New Bag 08/03/20 0503                Microbiologic Results:  Microbiology Results (last 7 days)     Procedure Component Value Units Date/Time    Blood culture [[id number]]     Order Status: Sent Specimen: Blood     Blood culture [[id number]]     Order Status: Sent Specimen: Blood     Blood culture (site 1) [[id number]] Collected: 08/01/20 1916    Order Status: Completed Specimen: Blood from Peripheral, Antecubital, Right Updated: 08/03/20 0513     Blood Culture, Routine Gram stain aer bottle: Gram positive cocci in clusters resembling Staph       Positive results previously called 08/03/2020  05:12    Narrative:      Site # 1, aerobic and anaerobic    Blood culture (site 2) [[id number]] Collected: 08/01/20 2015    Order Status: Completed Specimen: Blood from Peripheral, Antecubital, Right Updated: 08/03/20 0052     Blood Culture, Routine Gram stain aer bottle: Gram positive cocci in clusters resembling Staph       Results called to and read back by:Delaney Bradley RN 08/03/2020  00:52    Narrative:      Site # 2, aerobic only        Thank you for allowing us to participate in this patient's care.     Megan Castro PharmD 08/03/2020 11:31 AM

## 2020-08-03 NOTE — SIGNIFICANT EVENT
Notified by Nursing that 1/4 blood culture bottles drawn on 8/1/20 are positive for gram positive cocci in clusters. Vancomycin started. Will follow up identification.

## 2020-08-04 VITALS
DIASTOLIC BLOOD PRESSURE: 79 MMHG | BODY MASS INDEX: 28.67 KG/M2 | SYSTOLIC BLOOD PRESSURE: 182 MMHG | HEART RATE: 61 BPM | RESPIRATION RATE: 18 BRPM | TEMPERATURE: 98 F | HEIGHT: 61 IN | WEIGHT: 151.88 LBS | OXYGEN SATURATION: 94 %

## 2020-08-04 DIAGNOSIS — U07.1 COVID-19 VIRUS DETECTED: ICD-10-CM

## 2020-08-04 PROBLEM — N17.9 AKI (ACUTE KIDNEY INJURY): Status: RESOLVED | Noted: 2020-08-02 | Resolved: 2020-08-04

## 2020-08-04 PROBLEM — R78.81 GRAM-POSITIVE BACTEREMIA: Status: RESOLVED | Noted: 2020-08-03 | Resolved: 2020-08-04

## 2020-08-04 LAB
ANION GAP SERPL CALC-SCNC: 5 MMOL/L (ref 8–16)
BUN SERPL-MCNC: 12 MG/DL (ref 8–23)
CALCIUM SERPL-MCNC: 7.9 MG/DL (ref 8.7–10.5)
CHLORIDE SERPL-SCNC: 116 MMOL/L (ref 95–110)
CO2 SERPL-SCNC: 22 MMOL/L (ref 23–29)
CREAT SERPL-MCNC: 0.8 MG/DL (ref 0.5–1.4)
EST. GFR  (AFRICAN AMERICAN): >60 ML/MIN/1.73 M^2
EST. GFR  (NON AFRICAN AMERICAN): >60 ML/MIN/1.73 M^2
GLUCOSE SERPL-MCNC: 125 MG/DL (ref 70–110)
MAGNESIUM SERPL-MCNC: 1.3 MG/DL (ref 1.6–2.6)
PHOSPHATE SERPL-MCNC: 1.4 MG/DL (ref 2.7–4.5)
POCT GLUCOSE: 85 MG/DL (ref 70–110)
POCT GLUCOSE: 94 MG/DL (ref 70–110)
POTASSIUM SERPL-SCNC: 3.5 MMOL/L (ref 3.5–5.1)
SODIUM SERPL-SCNC: 143 MMOL/L (ref 136–145)

## 2020-08-04 PROCEDURE — 25000003 PHARM REV CODE 250: Performed by: PHYSICIAN ASSISTANT

## 2020-08-04 PROCEDURE — 25000003 PHARM REV CODE 250: Performed by: INTERNAL MEDICINE

## 2020-08-04 PROCEDURE — 80048 BASIC METABOLIC PNL TOTAL CA: CPT

## 2020-08-04 PROCEDURE — 83735 ASSAY OF MAGNESIUM: CPT

## 2020-08-04 PROCEDURE — 63600175 PHARM REV CODE 636 W HCPCS: Performed by: INTERNAL MEDICINE

## 2020-08-04 PROCEDURE — 63700000 PHARM REV CODE 250 ALT 637 W/O HCPCS: Performed by: INTERNAL MEDICINE

## 2020-08-04 PROCEDURE — 94761 N-INVAS EAR/PLS OXIMETRY MLT: CPT

## 2020-08-04 PROCEDURE — 36415 COLL VENOUS BLD VENIPUNCTURE: CPT

## 2020-08-04 PROCEDURE — 84100 ASSAY OF PHOSPHORUS: CPT

## 2020-08-04 RX ORDER — LOSARTAN POTASSIUM 25 MG/1
25 TABLET ORAL 2 TIMES DAILY
Qty: 180 TABLET | Refills: 3 | Status: SHIPPED | OUTPATIENT
Start: 2020-08-04 | End: 2020-08-04 | Stop reason: HOSPADM

## 2020-08-04 RX ORDER — HYDROCODONE BITARTRATE AND ACETAMINOPHEN 10; 325 MG/1; MG/1
1 TABLET ORAL EVERY 4 HOURS PRN
Qty: 20 TABLET | Refills: 0 | Status: SHIPPED | OUTPATIENT
Start: 2020-08-04 | End: 2020-08-04 | Stop reason: SDUPTHER

## 2020-08-04 RX ORDER — HYDROCODONE BITARTRATE AND ACETAMINOPHEN 10; 325 MG/1; MG/1
1 TABLET ORAL EVERY 4 HOURS PRN
Qty: 20 TABLET | Refills: 0 | Status: SHIPPED | OUTPATIENT
Start: 2020-08-04 | End: 2020-10-14

## 2020-08-04 RX ORDER — LOSARTAN POTASSIUM 50 MG/1
50 TABLET ORAL 2 TIMES DAILY
Status: DISCONTINUED | OUTPATIENT
Start: 2020-08-04 | End: 2020-08-04 | Stop reason: HOSPADM

## 2020-08-04 RX ADMIN — Medication 220 MG: at 09:08

## 2020-08-04 RX ADMIN — LOSARTAN POTASSIUM 50 MG: 50 TABLET, FILM COATED ORAL at 02:08

## 2020-08-04 RX ADMIN — OXYCODONE HYDROCHLORIDE AND ACETAMINOPHEN 1000 MG: 500 TABLET ORAL at 09:08

## 2020-08-04 RX ADMIN — AZITHROMYCIN MONOHYDRATE 500 MG: 250 TABLET ORAL at 09:08

## 2020-08-04 RX ADMIN — VANCOMYCIN HYDROCHLORIDE 1000 MG: 1 INJECTION, POWDER, LYOPHILIZED, FOR SOLUTION INTRAVENOUS at 05:08

## 2020-08-04 RX ADMIN — LOSARTAN POTASSIUM 25 MG: 25 TABLET, FILM COATED ORAL at 09:08

## 2020-08-04 RX ADMIN — SODIUM CHLORIDE: 0.9 INJECTION, SOLUTION INTRAVENOUS at 12:08

## 2020-08-04 RX ADMIN — CEFTRIAXONE 1 G: 1 INJECTION, SOLUTION INTRAVENOUS at 12:08

## 2020-08-04 RX ADMIN — CARVEDILOL 6.25 MG: 6.25 TABLET, FILM COATED ORAL at 09:08

## 2020-08-04 NOTE — PLAN OF CARE
AOx4. POC reviewed. Updated white board. Able to verbalize understanding, needs & follow commands. VSS. Tele-monitoring continues. 20g PIV to RAC. Receiving NS 75mL/h. Ice pack relieved pain to RLQ hematoma. Small puncture wound from needle visible; no drainage. Blood glucose monitoring AC/HS. NADN at this time. Resting quietly in bed. Independent w/ ADLs. Ambulatory. Hourly rounding complete. All safety measures remain in place. Bed alarm on & audible. Free of falls. SR up x2; bed low & locked. Call light w/in reach. Will continue to monitor throughout shift.

## 2020-08-04 NOTE — ASSESSMENT & PLAN NOTE
Will follow primary team.  - COVID-19 testing   - Infection Control notified     - Isolation:   - Airborne, Contact and Droplet Precautions  - Cohort patients into COVID units              - Limit visitors per hospital policy              - Consolidating lab draws, nursing care, provider visits, and interventions      - Management:  Supplemental O2 to maintain SpO2 >92%  Continuous/intermittent Pulse Ox  Albuterol treatment PRN

## 2020-08-04 NOTE — NURSING
Discharge summary, health teachings and instructions given to patient--verbalized understanding. IV removed-no complications noted. Tele monitor removed and returned to the monitor room. All questions answered with regards to discharge instructions. Reminded of the importance of follow-up appointments--patient to call for an appointment in 3 days. Left the unit ambulatory as preferred with personal belongings at hand and paper prescription.

## 2020-08-04 NOTE — PLAN OF CARE
No changes to plan of care. Continue with IV Abx. Plan for discharge tomorrow. No significant events during shift.

## 2020-08-04 NOTE — PROGRESS NOTES
[facility] Hospital Medicine  Progress Note    Patient Name: Leslie Wood  MRN: [id number]  Patient Class: IP- Inpatient   Admission Date: 8/1/2020  Length of Stay: 2 days  Attending Physician: Drew Jewell MD  Primary Care Provider: Angeles Carson MD        Subjective:     Principal Problem:CINTHIA (acute kidney injury)        HPI:  Leslie Wood is an 80 year old female who presented to the ED with complaints of chest congestion. Symptoms started approximately 6 days ago when she began having diarrhea. Approximately two days later, she noted fever as high as 101. She became concerned when she noted chest congestion and mild shortness of breath. There is associated nausea, decreased appetite and abdominal tenderness. She denies known COVID contacts. She works, but is alone in her office and leaves via her own entry. She live with her  who is not ill. In the ED, the patient's COVID test was positive. Her creatinine was elevated to 3 from a previous measurement of 0.9 on 12/13/19. Other labs were essentially negative. Code status was discussed with the patient. She is a full code. Her daughter, Vicki Puentes, is her surrogate medical decision maker.     Overview/Hospital Course:  8/3 - Patient much improved with therapy. Patient on Room Air. She reports resolution of diarrhea. No events    Interval History: Interval improvement. On RA    Review of Systems   Constitutional: Negative.  Negative for unexpected weight change.   HENT: Negative.  Negative for trouble swallowing.    Eyes: Negative.    Respiratory: Negative.  Negative for cough, shortness of breath and wheezing.    Cardiovascular: Negative.  Negative for chest pain.   Gastrointestinal: Negative.  Negative for blood in stool, diarrhea and vomiting.   Endocrine: Negative.    Genitourinary: Negative.    Musculoskeletal: Negative.    Skin: Negative.    Allergic/Immunologic: Negative.    Neurological: Negative.  Negative for dizziness.    Hematological: Negative.    Psychiatric/Behavioral: Negative.    All other systems reviewed and are negative.    Objective:     Vital Signs (Most Recent):  Temp: 97 °F (36.1 °C) (08/03/20 1952)  Pulse: 60 (08/03/20 1952)  Resp: 20 (08/03/20 1952)  BP: (!) 140/64 (08/03/20 1952)  SpO2: 96 % (08/03/20 1952) Vital Signs (24h Range):  Temp:  [96.4 °F (35.8 °C)-98.5 °F (36.9 °C)] 97 °F (36.1 °C)  Pulse:  [52-67] 60  Resp:  [17-20] 20  SpO2:  [92 %-96 %] 96 %  BP: (118-164)/(55-90) 140/64     Weight: 68 kg (149 lb 14.6 oz)  Body mass index is 28.33 kg/m².    Intake/Output Summary (Last 24 hours) at 8/3/2020 2216  Last data filed at 8/3/2020 2000  Gross per 24 hour   Intake 3336.75 ml   Output 1025 ml   Net 2311.75 ml      Physical Exam  Vitals signs and nursing note reviewed.   Constitutional:       General: She is not in acute distress.     Appearance: She is well-developed.   HENT:      Head: Normocephalic and atraumatic.   Eyes:      Conjunctiva/sclera: Conjunctivae normal.      Comments: PERRL   Neck:      Musculoskeletal: Neck supple.      Vascular: No JVD.   Cardiovascular:      Rate and Rhythm: Normal rate and regular rhythm.      Heart sounds: Normal heart sounds.   Pulmonary:      Effort: Pulmonary effort is normal.      Breath sounds: Normal breath sounds.   Abdominal:      General: Bowel sounds are normal. There is no distension.      Palpations: Abdomen is soft.      Tenderness: There is abdominal tenderness (diffuse).   Musculoskeletal: Normal range of motion.   Skin:     General: Skin is warm and dry.      Capillary Refill: Capillary refill takes 2 to 3 seconds.   Neurological:      Mental Status: She is alert and oriented to person, place, and time.   Psychiatric:         Behavior: Behavior normal.         Thought Content: Thought content normal.         Significant Labs:   Blood Culture: No results for input(s): LABBLOO in the last 48 hours.  BMP:   Recent Labs   Lab 08/03/20  0719   *       K 3.6   *   CO2 21*   BUN 22   CREATININE 0.8   CALCIUM 8.1*   MG 1.6     CBC:   Recent Labs   Lab 08/02/20  0545   WBC 4.27   HGB 12.9   HCT 39.7        CMP:   Recent Labs   Lab 08/02/20  0545 08/03/20  0719    141   K 3.6 3.6    112*   CO2 19* 21*   GLU 85 177*   BUN 49* 22   CREATININE 1.6* 0.8   CALCIUM 8.6* 8.1*   ANIONGAP 13 8   EGFRNONAA 30* >60     All pertinent labs within the past 24 hours have been reviewed.    Significant Imaging: I have reviewed all pertinent imaging results/findings within the past 24 hours.      Assessment/Plan:      * CINTHIA (acute kidney injury)  -Likely due to diarrhea and decreased oral intake.   -Will cautiously hydrate in the setting of COVID and monitor.     8/3  Resolved      Gram-positive bacteremia  ABX  Monitor  Repeat Cx      COVID-19  - COVID-19 testing   - Infection Control notified     - Isolation:   - Airborne, Contact and Droplet Precautions  - Cohort patients into COVID units  - N95 mask, wear eye protection  - 20 second hand hygiene              - Limit visitors per hospital policy              - Consolidating lab draws, nursing care, provider visits, and interventions      - Management:  Supplemental O2 to maintain SpO2 >92%  Telemetry  Continuous/intermittent Pulse Ox   Empiric antibiotics  No indication for steroids at this time    Advance Care Planning   Full code               Type 2 diabetes mellitus without complication, without long-term current use of insulin  -NISS.   -ADA diet.    hemoglobin A1C. 5.7        Essential hypertension  Continue ARB and Coreg.         VTE Risk Mitigation (From admission, onward)         Ordered     heparin (porcine) injection 5,000 Units  Every 8 hours      08/01/20 1842     IP VTE HIGH RISK PATIENT  Once      08/01/20 1842     Place sequential compression device  Until discontinued      08/01/20 1842                      Drew Jewell MD  Department of Hospital Medicine   [facility]

## 2020-08-04 NOTE — HPI
80 year old female who presented to the ED with complaints of chest congestion.She is being managed for COVID infection .  Blood culture done -08/01- [facility].  Primary team requested ID review of abnormal blood cultures.

## 2020-08-04 NOTE — PROGRESS NOTES
Pharmacokinetic Assessment Sign Off: IV Vancomycin    Therapy with Vancomycin complete and/or consult discontinued by provider.  Pharmacy will sign off, please re-consult as needed.    Thank you for allowing us to participate in this patient's care.     Megan Castro PharmD 08/04/2020 11:43 AM

## 2020-08-04 NOTE — SUBJECTIVE & OBJECTIVE
Interval History: Interval improvement. On RA    Review of Systems   Constitutional: Negative.  Negative for unexpected weight change.   HENT: Negative.  Negative for trouble swallowing.    Eyes: Negative.    Respiratory: Negative.  Negative for cough, shortness of breath and wheezing.    Cardiovascular: Negative.  Negative for chest pain.   Gastrointestinal: Negative.  Negative for blood in stool, diarrhea and vomiting.   Endocrine: Negative.    Genitourinary: Negative.    Musculoskeletal: Negative.    Skin: Negative.    Allergic/Immunologic: Negative.    Neurological: Negative.  Negative for dizziness.   Hematological: Negative.    Psychiatric/Behavioral: Negative.    All other systems reviewed and are negative.    Objective:     Vital Signs (Most Recent):  Temp: 97 °F (36.1 °C) (08/03/20 1952)  Pulse: 60 (08/03/20 1952)  Resp: 20 (08/03/20 1952)  BP: (!) 140/64 (08/03/20 1952)  SpO2: 96 % (08/03/20 1952) Vital Signs (24h Range):  Temp:  [96.4 °F (35.8 °C)-98.5 °F (36.9 °C)] 97 °F (36.1 °C)  Pulse:  [52-67] 60  Resp:  [17-20] 20  SpO2:  [92 %-96 %] 96 %  BP: (118-164)/(55-90) 140/64     Weight: 68 kg (149 lb 14.6 oz)  Body mass index is 28.33 kg/m².    Intake/Output Summary (Last 24 hours) at 8/3/2020 2216  Last data filed at 8/3/2020 2000  Gross per 24 hour   Intake 3336.75 ml   Output 1025 ml   Net 2311.75 ml      Physical Exam  Vitals signs and nursing note reviewed.   Constitutional:       General: She is not in acute distress.     Appearance: She is well-developed.   HENT:      Head: Normocephalic and atraumatic.   Eyes:      Conjunctiva/sclera: Conjunctivae normal.      Comments: PERRL   Neck:      Musculoskeletal: Neck supple.      Vascular: No JVD.   Cardiovascular:      Rate and Rhythm: Normal rate and regular rhythm.      Heart sounds: Normal heart sounds.   Pulmonary:      Effort: Pulmonary effort is normal.      Breath sounds: Normal breath sounds.   Abdominal:      General: Bowel sounds are normal.  There is no distension.      Palpations: Abdomen is soft.      Tenderness: There is abdominal tenderness (diffuse).   Musculoskeletal: Normal range of motion.   Skin:     General: Skin is warm and dry.      Capillary Refill: Capillary refill takes 2 to 3 seconds.   Neurological:      Mental Status: She is alert and oriented to person, place, and time.   Psychiatric:         Behavior: Behavior normal.         Thought Content: Thought content normal.         Significant Labs:   Blood Culture: No results for input(s): LABBLOO in the last 48 hours.  BMP:   Recent Labs   Lab 08/03/20  0719   *      K 3.6   *   CO2 21*   BUN 22   CREATININE 0.8   CALCIUM 8.1*   MG 1.6     CBC:   Recent Labs   Lab 08/02/20  0545   WBC 4.27   HGB 12.9   HCT 39.7        CMP:   Recent Labs   Lab 08/02/20  0545 08/03/20  0719    141   K 3.6 3.6    112*   CO2 19* 21*   GLU 85 177*   BUN 49* 22   CREATININE 1.6* 0.8   CALCIUM 8.6* 8.1*   ANIONGAP 13 8   EGFRNONAA 30* >60     All pertinent labs within the past 24 hours have been reviewed.    Significant Imaging: I have reviewed all pertinent imaging results/findings within the past 24 hours.

## 2020-08-04 NOTE — ASSESSMENT & PLAN NOTE
This might be a contaminant -will discuss with microlab in AM   Vancomycin was added to the regime.

## 2020-08-04 NOTE — SUBJECTIVE & OBJECTIVE
Past Medical History:   Diagnosis Date    Arthritis     Cataract     GERD (gastroesophageal reflux disease)     Hypertension        Past Surgical History:   Procedure Laterality Date    ADENOIDECTOMY      ADRENAL GLAND SURGERY      APPENDECTOMY      BACK SURGERY      fusion l 4-5 s 1,2,3  fusion l 2-3    EYE SURGERY      HEMORRHOID SURGERY      HERNIA REPAIR      HYSTERECTOMY      indirect lumbar decompression      percutaneous placement of interspinous extension blocker    TONSILLECTOMY         Review of patient's allergies indicates:   Allergen Reactions    Amitriptyline     Hydrochlorothiazide      Causes muscle cramping    Lisinopril      hyperkalemia    Percocet [oxycodone-acetaminophen] Other (See Comments)     Seizures    Codeine Nausea Only and Rash       Medications:  Medications Prior to Admission   Medication Sig    carvediloL (COREG) 6.25 MG tablet TAKE (1) TABLET BY MOUTH 2 TIMES A DAY WITH MEALS    hydrocodone-acetaminophen 10-325mg (NORCO)  mg Tab     Lactobac no.41/Bifidobact no.7 (PROBIOTIC-10 ORAL) Take by mouth.    tizanidine (ZANAFLEX) 4 MG tablet Take 1 tablet (4 mg total) by mouth every 6 (six) hours as needed (HOLD while on CIPRO).    traZODone (DESYREL) 50 MG tablet TAKE 1 TABLET BY MOUTH NIGHTLY AS NEEDED FOR INSOMNIA    UNABLE TO FIND Tumeric root extract    valsartan (DIOVAN) 160 MG tablet Take 0.5 tablets (80 mg total) by mouth 2 (two) times daily.    vitamin D (VITAMIN D3) 1000 units Tab Take 1,000 Units by mouth.    amLODIPine (NORVASC) 5 MG tablet Take 1 tablet (5 mg total) by mouth once daily. (Patient not taking: Reported on 1/24/2020)    [DISCONTINUED] hydrALAZINE (APRESOLINE) 10 MG tablet Take 1 tablet (10 mg total) by mouth every 12 (twelve) hours.    [DISCONTINUED] hydroCHLOROthiazide (HYDRODIURIL) 12.5 MG Tab Take 1 tablet (12.5 mg total) by mouth once daily.     Antibiotics (From admission, onward)    Start     Stop Route Frequency Ordered     08/04/20 0500  vancomycin in dextrose 5 % 1 gram/250 mL IVPB 1,000 mg      -- IV Every 24 hours (non-standard times) 08/03/20 1104    08/03/20 1200  cefTRIAXone (ROCEPHIN) 1 g/50 mL D5W IVPB      08/08 1159 IV Every 24 hours (non-standard times) 08/03/20 1051    08/02/20 0400  azithromycin tablet 500 mg      08/05 0859 Oral Daily 08/02/20 0350        Antifungals (From admission, onward)    None        Antivirals (From admission, onward)    None           Immunization History   Administered Date(s) Administered    Influenza - High Dose - PF (65 years and older) 12/02/2019    Influenza - Trivalent (ADULT) 10/29/2004, 10/25/2014    Influenza - Trivalent - PF (ADULT) 01/15/2014    Pneumococcal Conjugate - 13 Valent 03/06/2017    Pneumococcal Polysaccharide - 23 Valent 06/03/2009    Td - PF (ADULT) 06/24/2019       Family History     Problem Relation (Age of Onset)    Diabetes Mother    Heart disease Mother    Hypertension Mother, Father    Kidney disease Father        Social History     Socioeconomic History    Marital status:      Spouse name: Not on file    Number of children: Not on file    Years of education: Not on file    Highest education level: Not on file   Occupational History    Not on file   Social Needs    Financial resource strain: Not on file    Food insecurity     Worry: Not on file     Inability: Not on file    Transportation needs     Medical: Not on file     Non-medical: Not on file   Tobacco Use    Smoking status: Never Smoker    Smokeless tobacco: Never Used   Substance and Sexual Activity    Alcohol use: No    Drug use: No    Sexual activity: Not Currently   Lifestyle    Physical activity     Days per week: Not on file     Minutes per session: Not on file    Stress: Not on file   Relationships    Social connections     Talks on phone: Not on file     Gets together: Not on file     Attends [employer] service: Not on file     Active member of club or organization: Not on  file     Attends meetings of clubs or organizations: Not on file     Relationship status: Not on file   Other Topics Concern    Not on file   Social History Narrative    Not on file     Review of Systems  Objective:     Vital Signs (Most Recent):  Temp: 97.6 °F (36.4 °C) (08/04/20 0510)  Pulse: 71 (08/04/20 0510)  Resp: 20 (08/04/20 0510)  BP: (!) 176/76 (08/04/20 0510)  SpO2: 97 % (08/04/20 0510) Vital Signs (24h Range):  Temp:  [96.4 °F (35.8 °C)-98.5 °F (36.9 °C)] 97.6 °F (36.4 °C)  Pulse:  [52-76] 71  Resp:  [17-20] 20  SpO2:  [92 %-97 %] 97 %  BP: (118-176)/(55-90) 176/76     Weight: 68.9 kg (151 lb 14.4 oz)  Body mass index is 28.7 kg/m².    Estimated Creatinine Clearance: 48.1 mL/min (based on SCr of 0.8 mg/dL).    Physical Exam  Vitals signs and nursing note reviewed.     please see primary team PE    Significant Labs:   Blood Culture:   Recent Labs   Lab 08/01/20  1916 08/01/20 2015 08/03/20  1133   LABBLOO Gram stain aer bottle: Gram positive cocci in clusters resembling Staph   Positive results previously called 08/03/2020  05:12 Gram stain aer bottle: Gram positive cocci in clusters resembling Staph   Results called to and read back by:Delaney Bradley RN 08/03/2020  00:52  Gram stain mateo bottle: Gram positive cocci in clusters resembling Staph   Positive results previously called 08/04/2020  05:21 No Growth to date  No Growth to date     BMP:   Recent Labs   Lab 08/03/20  0719   *      K 3.6   *   CO2 21*   BUN 22   CREATININE 0.8   CALCIUM 8.1*   MG 1.6     CBC: No results for input(s): WBC, HGB, HCT, PLT in the last 48 hours.  All pertinent labs within the past 24 hours have been reviewed.  Microbiology Results (last 7 days)     Procedure Component Value Units Date/Time    Blood culture (site 2) [[id number]] Collected: 08/01/20 2015    Order Status: Completed Specimen: Blood from Peripheral, Antecubital, Right Updated: 08/04/20 0523     Blood Culture, Routine Gram stain aer  bottle: Gram positive cocci in clusters resembling Staph       Results called to and read back by:Delaney Bradley RN 08/03/2020  00:52      Gram stain mateo bottle: Gram positive cocci in clusters resembling Staph       Positive results previously called 08/04/2020  05:21    Narrative:      Site # 2, aerobic only    Blood culture [[id number]] Collected: 08/03/20 1133    Order Status: Completed Specimen: Blood from Peripheral, Right Hand Updated: 08/04/20 0115     Blood Culture, Routine No Growth to date    Narrative:      For Temp greater than 101    Blood culture [[id number]] Collected: 08/03/20 1133    Order Status: Completed Specimen: Blood from Peripheral, Right Hand Updated: 08/04/20 0115     Blood Culture, Routine No Growth to date    Narrative:      For Temp greater than 101    Blood culture (site 1) [[id number]] Collected: 08/01/20 1916    Order Status: Completed Specimen: Blood from Peripheral, Antecubital, Right Updated: 08/03/20 0513     Blood Culture, Routine Gram stain aer bottle: Gram positive cocci in clusters resembling Staph       Positive results previously called 08/03/2020  05:12    Narrative:      Site # 1, aerobic and anaerobic          Significant Imaging: I have reviewed all pertinent imaging results/findings within the past 24 hours.

## 2020-08-04 NOTE — NURSING
Pt has very large hematoma to RLQ d/t heparin injection. C/O severe pain when touched. Ice pack applied. Refusing heparin injection. Charge nurse made aware.

## 2020-08-04 NOTE — HOSPITAL COURSE
8/3 - Patient much improved with therapy. Patient on Room Air. She reports resolution of diarrhea. No events  Patient stable for a safe discharge to home

## 2020-08-04 NOTE — ASSESSMENT & PLAN NOTE
-Likely due to diarrhea and decreased oral intake.   -Will cautiously hydrate in the setting of COVID and monitor.     8/3  Resolved

## 2020-08-04 NOTE — CONSULTS
[facility]  Infectious Disease  Consult Note    Patient Name: Leslie Wood  MRN: [id number]  Admission Date: 8/1/2020  Hospital Length of Stay: 3 days  Attending Physician: Drew Jewell MD  Primary Care Provider: Angeles Carson MD     Isolation Status: Airborne and Contact and Droplet    Patient information was obtained from past medical records and ER records.      Consults  Assessment/Plan:     Gram-positive bacteremia  This might be a contaminant -will discuss with microlab in AM   Vancomycin was added to the regime.      COVID-19  Will follow primary team.  - COVID-19 testing   - Infection Control notified     - Isolation:   - Airborne, Contact and Droplet Precautions  - Cohort patients into COVID units              - Limit visitors per hospital policy              - Consolidating lab draws, nursing care, provider visits, and interventions      - Management:  Supplemental O2 to maintain SpO2 >92%  Continuous/intermittent Pulse Ox  Albuterol treatment PRN         Type 2 diabetes mellitus without complication, without long-term current use of insulin  Will continue insulin sliding scale, diabetic diet.        Thank you for your consult. I will follow-up with patient. Please contact us if you have any additional questions.    Fernando Sosa MD  Infectious Disease  [facility]    Subjective:     Principal Problem: CINTHIA (acute kidney injury)    HPI: 80 year old female who presented to the ED with complaints of chest congestion.She is being managed for COVID infection .  Blood culture done -08/01- Javier.  Primary team requested ID review of abnormal blood cultures.      Past Medical History:   Diagnosis Date    Arthritis     Cataract     GERD (gastroesophageal reflux disease)     Hypertension        Past Surgical History:   Procedure Laterality Date    ADENOIDECTOMY      ADRENAL GLAND SURGERY      APPENDECTOMY      BACK SURGERY      fusion l 4-5 s 1,2,3  fusion l 2-3    EYE  SURGERY      HEMORRHOID SURGERY      HERNIA REPAIR      HYSTERECTOMY      indirect lumbar decompression      percutaneous placement of interspinous extension blocker    TONSILLECTOMY         Review of patient's allergies indicates:   Allergen Reactions    Amitriptyline     Hydrochlorothiazide      Causes muscle cramping    Lisinopril      hyperkalemia    Percocet [oxycodone-acetaminophen] Other (See Comments)     Seizures    Codeine Nausea Only and Rash       Medications:  Medications Prior to Admission   Medication Sig    carvediloL (COREG) 6.25 MG tablet TAKE (1) TABLET BY MOUTH 2 TIMES A DAY WITH MEALS    hydrocodone-acetaminophen 10-325mg (NORCO)  mg Tab     Lactobac no.41/Bifidobact no.7 (PROBIOTIC-10 ORAL) Take by mouth.    tizanidine (ZANAFLEX) 4 MG tablet Take 1 tablet (4 mg total) by mouth every 6 (six) hours as needed (HOLD while on CIPRO).    traZODone (DESYREL) 50 MG tablet TAKE 1 TABLET BY MOUTH NIGHTLY AS NEEDED FOR INSOMNIA    UNABLE TO FIND Tumeric root extract    valsartan (DIOVAN) 160 MG tablet Take 0.5 tablets (80 mg total) by mouth 2 (two) times daily.    vitamin D (VITAMIN D3) 1000 units Tab Take 1,000 Units by mouth.    amLODIPine (NORVASC) 5 MG tablet Take 1 tablet (5 mg total) by mouth once daily. (Patient not taking: Reported on 1/24/2020)    [DISCONTINUED] hydrALAZINE (APRESOLINE) 10 MG tablet Take 1 tablet (10 mg total) by mouth every 12 (twelve) hours.    [DISCONTINUED] hydroCHLOROthiazide (HYDRODIURIL) 12.5 MG Tab Take 1 tablet (12.5 mg total) by mouth once daily.     Antibiotics (From admission, onward)    Start     Stop Route Frequency Ordered    08/04/20 0500  vancomycin in dextrose 5 % 1 gram/250 mL IVPB 1,000 mg      -- IV Every 24 hours (non-standard times) 08/03/20 1104    08/03/20 1200  cefTRIAXone (ROCEPHIN) 1 g/50 mL D5W IVPB      08/08 1159 IV Every 24 hours (non-standard times) 08/03/20 1051    08/02/20 0400  azithromycin tablet 500 mg      08/05  0859 Oral Daily 08/02/20 0350        Antifungals (From admission, onward)    None        Antivirals (From admission, onward)    None           Immunization History   Administered Date(s) Administered    Influenza - High Dose - PF (65 years and older) 12/02/2019    Influenza - Trivalent (ADULT) 10/29/2004, 10/25/2014    Influenza - Trivalent - PF (ADULT) 01/15/2014    Pneumococcal Conjugate - 13 Valent 03/06/2017    Pneumococcal Polysaccharide - 23 Valent 06/03/2009    Td - PF (ADULT) 06/24/2019       Family History     Problem Relation (Age of Onset)    Diabetes Mother    Heart disease Mother    Hypertension Mother, Father    Kidney disease Father        Social History     Socioeconomic History    Marital status:      Spouse name: Not on file    Number of children: Not on file    Years of education: Not on file    Highest education level: Not on file   Occupational History    Not on file   Social Needs    Financial resource strain: Not on file    Food insecurity     Worry: Not on file     Inability: Not on file    Transportation needs     Medical: Not on file     Non-medical: Not on file   Tobacco Use    Smoking status: Never Smoker    Smokeless tobacco: Never Used   Substance and Sexual Activity    Alcohol use: No    Drug use: No    Sexual activity: Not Currently   Lifestyle    Physical activity     Days per week: Not on file     Minutes per session: Not on file    Stress: Not on file   Relationships    Social connections     Talks on phone: Not on file     Gets together: Not on file     Attends Baptism service: Not on file     Active member of club or organization: Not on file     Attends meetings of clubs or organizations: Not on file     Relationship status: Not on file   Other Topics Concern    Not on file   Social History Narrative    Not on file     Review of Systems  Objective:     Vital Signs (Most Recent):  Temp: 97.6 °F (36.4 °C) (08/04/20 0510)  Pulse: 71 (08/04/20  0510)  Resp: 20 (08/04/20 0510)  BP: (!) 176/76 (08/04/20 0510)  SpO2: 97 % (08/04/20 0510) Vital Signs (24h Range):  Temp:  [96.4 °F (35.8 °C)-98.5 °F (36.9 °C)] 97.6 °F (36.4 °C)  Pulse:  [52-76] 71  Resp:  [17-20] 20  SpO2:  [92 %-97 %] 97 %  BP: (118-176)/(55-90) 176/76     Weight: 68.9 kg (151 lb 14.4 oz)  Body mass index is 28.7 kg/m².    Estimated Creatinine Clearance: 48.1 mL/min (based on SCr of 0.8 mg/dL).    Physical Exam  Vitals signs and nursing note reviewed.     please see primary team PE    Significant Labs:   Blood Culture:   Recent Labs   Lab 08/01/20  1916 08/01/20 2015 08/03/20  1133   LABBLOO Gram stain aer bottle: Gram positive cocci in clusters resembling Staph   Positive results previously called 08/03/2020  05:12 Gram stain aer bottle: Gram positive cocci in clusters resembling Staph   Results called to and read back by:Delaney Bradley RN 08/03/2020  00:52  Gram stain mateo bottle: Gram positive cocci in clusters resembling Staph   Positive results previously called 08/04/2020  05:21 No Growth to date  No Growth to date     BMP:   Recent Labs   Lab 08/03/20  0719   *      K 3.6   *   CO2 21*   BUN 22   CREATININE 0.8   CALCIUM 8.1*   MG 1.6     CBC: No results for input(s): WBC, HGB, HCT, PLT in the last 48 hours.  All pertinent labs within the past 24 hours have been reviewed.  Microbiology Results (last 7 days)     Procedure Component Value Units Date/Time    Blood culture (site 2) [[id number]] Collected: 08/01/20 2015    Order Status: Completed Specimen: Blood from Peripheral, Antecubital, Right Updated: 08/04/20 0523     Blood Culture, Routine Gram stain aer bottle: Gram positive cocci in clusters resembling Staph       Results called to and read back by:Delaney Bradley RN 08/03/2020  00:52      Gram stain mateo bottle: Gram positive cocci in clusters resembling Staph       Positive results previously called 08/04/2020  05:21    Narrative:      Site # 2, aerobic only     Blood culture [[id number]] Collected: 08/03/20 1133    Order Status: Completed Specimen: Blood from Peripheral, Right Hand Updated: 08/04/20 0115     Blood Culture, Routine No Growth to date    Narrative:      For Temp greater than 101    Blood culture [[id number]] Collected: 08/03/20 1133    Order Status: Completed Specimen: Blood from Peripheral, Right Hand Updated: 08/04/20 0115     Blood Culture, Routine No Growth to date    Narrative:      For Temp greater than 101    Blood culture (site 1) [[id number]] Collected: 08/01/20 1916    Order Status: Completed Specimen: Blood from Peripheral, Antecubital, Right Updated: 08/03/20 0513     Blood Culture, Routine Gram stain aer bottle: Gram positive cocci in clusters resembling Staph       Positive results previously called 08/03/2020  05:12    Narrative:      Site # 1, aerobic and anaerobic          Significant Imaging: I have reviewed all pertinent imaging results/findings within the past 24 hours.

## 2020-08-04 NOTE — PLAN OF CARE
No discharge needs identified.       08/04/20 1325   Final Note   Assessment Type Final Discharge Note   Anticipated Discharge Disposition Home   Right Care Referral Info   Post Acute Recommendation No Care

## 2020-08-05 ENCOUNTER — NURSE TRIAGE (OUTPATIENT)
Dept: ADMINISTRATIVE | Facility: CLINIC | Age: 80
End: 2020-08-05

## 2020-08-05 ENCOUNTER — PATIENT OUTREACH (OUTPATIENT)
Dept: ADMINISTRATIVE | Facility: CLINIC | Age: 80
End: 2020-08-05

## 2020-08-05 RX ORDER — TRAMADOL HYDROCHLORIDE 50 MG/1
50 TABLET ORAL 2 TIMES DAILY
COMMUNITY
End: 2020-10-14

## 2020-08-05 NOTE — TELEPHONE ENCOUNTER
Called pt for TCC call. C/o diarrhea that started again this am. Has had 3 episodes. Denies fever/pain. Has not tried OTC med. Disposition: Be seen in office in 3 days. Already has HOSPFU on 8/7 @ 1000. OOC if worsens. Message to PCP.     Reason for Disposition   MILD diarrhea (e.g., 1-3 or more stools than normal in past 24 hours) diarrhea without known cause and present > 7 days    Additional Information   Negative: Shock suspected (e.g., cold/pale/clammy skin, too weak to stand, low BP, rapid pulse)   Negative: Difficult to awaken or acting confused (e.g., disoriented, slurred speech)   Negative: Sounds like a life-threatening emergency to the triager   Negative: Vomiting also present and worse than the diarrhea   Negative: Blood in stool and without diarrhea   Negative: SEVERE abdominal pain (e.g., excruciating) and present > 1 hour   Negative: SEVERE abdominal pain and age > 60   Negative: Bloody, black, or tarry bowel movements   Negative: SEVERE diarrhea (e.g., 7 or more times / day more than normal) and age > 60 years   Negative: Constant abdominal pain lasting > 2 hours   Negative: Drinking very little and has signs of dehydration (e.g., no urine > 12 hours, very dry mouth, very lightheaded)   Negative: Patient sounds very sick or weak to the triager   Negative: SEVERE diarrhea (e.g., 7 or more times / day more than normal) and present > 24 hours (1 day)   Negative: MODERATE diarrhea (e.g., 4-6 times / day more than normal) and present > 48 hours (2 days)   Negative: MODERATE diarrhea (e.g., 4-6 times / day more than normal) and age > 70 years   Negative: Abdominal pain  (Exception: pain clears completely with each passage of diarrhea stool)   Negative: Fever > 101 F (38.3 C)   Negative: Blood in the stool   Negative: Mucus or pus in stool has been present > 2 days and diarrhea is more than mild   Negative: Weak immune system (e.g., HIV positive, cancer chemo, splenectomy, organ  transplant, chronic steroids)   Negative: Travel to a foreign country in past month   Negative: Recent antibiotic therapy (i.e., within last 2 months) and diarrhea present > 3 days since antibiotic was stopped   Negative: Recent hospitalization and diarrhea present > 3 days   Negative: Tube feedings (e.g., nasogastric, g-tube, j-tube)    Protocols used: DIARRHEA-A-[place]

## 2020-08-05 NOTE — PATIENT INSTRUCTIONS
Instructions for Patients with Confirmed or Suspected COVID-19    If you are awaiting your test result, you will either be called or it will be released to the patient portal.  If you have any questions about your test, please visit [email or web address] or call our COVID-19 information line at [phone].      Instructions for non-hospitalized or discharged patients with confirmed or suspected COVID-19:       Stay home except to get medical care.    Separate yourself from other people and animals in your home.    Call ahead before visiting your doctor.    Wear a face mask.    Cover your coughs and sneezes.    Clean your hands often.    Avoid sharing personal household items.    Clean all high-touch surfaces every day.    Monitor your symptoms. Seek prompt medical attention if your illness is worsening (e.g., difficulty breathing). Before seeking care, call your healthcare provider.    If you have a medical emergency and must call 911, notify the dispatcher that you have or are being evaluated for COVID-19. If possible, put on a face mask before emergency medical services arrive.    Use the following symptom-based strategy to return to normal activity following a suspected or confirmed case of COVID-19. Continue isolation until:   o At least 3 days (72 hours) have passed since recovery defined as resolution of fever without the use of fever-reducing medications and improvement in respiratory symptoms (e.g. cough, shortness of breath), and   o At least 10 days have passed since the first positive test.       As one of the next steps, you will receive a call or text from the [place] Department of Health ([facility]) COVID-19 Tracing Team. See the contact information below so you know not to ignore the health departments call. It is important that you contact them back immediately so they can help.     Contact Tracer Number:  [phone]  Caller ID for most carriers: [place] [facility]    What is  contact tracing?   Contact tracing is a process that helps identify everyone who has been in close contact with an infected person. Contact tracers let those people know they may have been exposed and guide them on next steps. Confidentiality is important for everyone; no one will be told who may have exposed them to the virus.   Your involvement is important. The more we know about where and how this virus is spreading, the better chance we have at stopping it from spreading further.  What does exposure mean?   Exposure means you have been within 6 feet for more than 15 minutes with a person who has or had COVID-19.  What kind of questions do the contact tracers ask?   A contact tracer will confirm your basic contact information including name, address, phone number, and next of kin, as well as asking about any symptoms you may have had. Theyll also ask you how you think you may have gotten sick, such as places where you may have been exposed to the virus, and people you were with. Those names will never be shared with anyone outside of that call, and will only be used to help trace and stop the spread of the virus.   I have privacy concerns. How will the state use my information?   Your privacy about your health is important. All calls are completed using call centers that use the appropriate health privacy protection measures (HIPAA compliance), meaning that your patient information is safe. No one will ever ask you any questions related to immigration status. Your health comes first.   Do I have to participate?   You do not have to participate, but we strongly encourage you to. Contact tracing can help us catch and control new outbreaks as theyre developing to keep your friends and family safe.   What if I dont hear from anyone?   If you dont receive a call within 24 hours, you can call the number above right away to inquire about your status. That line is open from 8:00 am - 8:00 p.m., 7 days a  week.  Contact tracing saves lives! Together, we have the power to beat this virus and keep our loved ones and neighbors safe.       Instructions for household members, intimate partners and caregivers in a non-healthcare setting of a patient with confirmed or suspected COVID-19:         Close contacts should monitor their health and call their healthcare provider right away if they develop symptoms suggestive of COVID-19 (e.g., fever, cough, shortness of breath).    Stay home except to get medical care. Separate yourself from other people and animals in the home.   Monitor the patients symptoms. If the patient is getting sicker, call his or her healthcare provider. If the patient has a medical emergency and you need to call 911, notify the dispatch personnel that the patient has or is being evaluated for COVID-19.    Wear a facemask when around other people such as sharing a room or vehicle and before entering a healthcare provider's office.   Cover coughs and sneezes with a tissue. Throw used tissues in a lined trash can immediately and wash hands.   Clean hands often with soap and water for at least 20 seconds or with an alcohol-based hand , rubbing hands together until they feel dry. Avoid touching your eyes, nose, and mouth with unwashed hands.   Clean all high-touch; surfaces every day, including counters, tabletops, doorknobs, bathroom fixtures, toilets, phones, keyboards, tablets, bedside tables, etc. Use a household cleaning spray or wipe according to label instructions.   Avoid sharing personal household items such as dishes, drinking glasses, cups, towels, bedding, etc. After these items are used, they should be washed thoroughly with soap and water.   Continue isolation until:   At least 3 days (72 hours) have passed since recovery defined as resolution of fever without the use of fever-reducing medications and improvement in respiratory symptoms (e.g. cough, shortness of breath),  and    At least 10 days have passed since the patients first positive test.    https://www.cdc.gov/coronavirus/2019-ncov/your-health/index.htm

## 2020-08-06 LAB
BACTERIA BLD CULT: ABNORMAL

## 2020-08-07 ENCOUNTER — OFFICE VISIT (OUTPATIENT)
Dept: INTERNAL MEDICINE | Facility: CLINIC | Age: 80
End: 2020-08-07
Payer: MEDICARE

## 2020-08-07 DIAGNOSIS — U07.1 COVID-19: Primary | ICD-10-CM

## 2020-08-07 DIAGNOSIS — R10.9 ABDOMINAL PAIN, UNSPECIFIED ABDOMINAL LOCATION: ICD-10-CM

## 2020-08-07 DIAGNOSIS — Z71.89 ADVICE GIVEN ABOUT COVID-19 VIRUS BY TELEPHONE: ICD-10-CM

## 2020-08-07 LAB
BACTERIA BLD CULT: ABNORMAL

## 2020-08-07 PROCEDURE — 99442 PR PHYSICIAN TELEPHONE EVALUATION 11-20 MIN: ICD-10-PCS | Mod: 95,,, | Performed by: FAMILY MEDICINE

## 2020-08-07 PROCEDURE — 99442 PR PHYSICIAN TELEPHONE EVALUATION 11-20 MIN: CPT | Mod: 95,,, | Performed by: FAMILY MEDICINE

## 2020-08-07 NOTE — PATIENT INSTRUCTIONS
Please isolate yourself at home.  You may leave home and/or return to work once the following conditions are met:    If you were not hospitalized and are not severely immunocompromised*:   More than 10 days since symptoms first appeared AND   More than 24 hours fever free without medications AND   Symptoms have improved     If you were hospitalized OR are severely immunocompromised*:   More than 20 days since symptoms first appeared   More than 24 hrs hours fever free without medications   Symptoms have improved    If you had no symptoms but tested postivepositive:   More than 10 days since the date of the first positive test (20 days if severely immunocompromised).   If you develop symptoms, then use the guidelines above.     *Definition of severely immunocompromised:  - Current chemotherapy for cancer  - Untreated HIV with CD4 count less than 200  - Combined primary immunodeficiency disorder  - Prednisone more than 20 mg per day for more than 14 days  - Post-transplant patients    Additional instructions:   Separate yourself from other people and animals in your home.   Call ahead before visiting your doctor.   Wear a facemask when around others.   Cover your coughs and sneezes.   Wash your hands often with soap and water; hand  can be used, too.   Avoid sharing personal household items.   Wipe down surfaces used daily.   Monitor your symptoms. Seek prompt medical attention if your illness is worsening (e.g., difficulty breathing).    Before seeking care, call your healthcare provider.   If you have a medical emergency and need to call 911, notify the dispatch personnel that you have, or are being evaluated for COVID-19. If possible, put on a facemask before emergency medical services arrive.

## 2020-08-07 NOTE — PROGRESS NOTES
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     Each patient to whom I provide medical services by telemedicine is:  (1) informed of the relationship between the physician and patient and the respective role of any other health care provider with respect to management of the patient; and (2) notified that they may decline to receive medical services by telemedicine and may withdraw from such care at any time. Patient verbally consented to receive this service via voice-only telephone call.       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 Cautioned patient and family to avoid if sx persist and to always wear a mask      Last hba1c 5.7, not taking meds  Infection could worsen         This service was not originating from a related E/M service provided within the previous 7 days nor will  to an E/M service or procedure within the next 24 hours or my soonest available appointment.  Prevailing standard of care was able to be met in this audio-only visit.

## 2020-08-07 NOTE — PLAN OF CARE
08/07/20 0759   Final Note   Assessment Type Final Discharge Note   Anticipated Discharge Disposition Home   Right Care Referral Info   Post Acute Recommendation No Care

## 2020-08-07 NOTE — DISCHARGE SUMMARY
[facility] Hospital Medicine  Discharge Summary      Patient Name: Leslie Wood  MRN: [id number]  Admission Date: 8/1/2020  Hospital Length of Stay: 3 days  Discharge Date and Time: 8/4/2020  2:45 PM  Attending Physician: No att. providers found   Discharging Provider: Drew Jewell MD  Primary Care Provider: Angeles Carson MD      HPI:   Leslie Wood is an 80 year old female who presented to the ED with complaints of chest congestion. Symptoms started approximately 6 days ago when she began having diarrhea. Approximately two days later, she noted fever as high as 101. She became concerned when she noted chest congestion and mild shortness of breath. There is associated nausea, decreased appetite and abdominal tenderness. She denies known COVID contacts. She works, but is alone in her office and leaves via her own entry. She live with her  who is not ill. In the ED, the patient's COVID test was positive. Her creatinine was elevated to 3 from a previous measurement of 0.9 on 12/13/19. Other labs were essentially negative. Code status was discussed with the patient. She is a full code. Her daughter, Vicki Puentes, is her surrogate medical decision maker.     * No surgery found *      Hospital Course:   8/3 - Patient much improved with therapy. Patient on Room Air. She reports resolution of diarrhea. No events  Patient stable for a safe discharge to home     Consults:     No new Assessment & Plan notes have been filed under this hospital service since the last note was generated.  Service: Hospital Medicine    Final Active Diagnoses:    Diagnosis Date Noted POA    COVID-19 [U07.1] 08/01/2020 Yes    Type 2 diabetes mellitus without complication, without long-term current use of insulin [E11.9] 01/24/2020 Yes    Essential hypertension [I10] 07/30/2015 Yes      Problems Resolved During this Admission:    Diagnosis Date Noted Date Resolved POA    PRINCIPAL PROBLEM:  CINTHIA (acute kidney  injury) [N17.9] 08/02/2020 08/04/2020 Yes    Gram-positive bacteremia [R78.81] 08/03/2020 08/04/2020 Yes       Discharged Condition: stable    Disposition: Home or Self Care    Follow Up:  Follow-up Information     Angeles Carson MD In 3 days.    Specialty: Internal Medicine  Contact information:  [address] [place] [address] [address]ggy [place] [address] [phone]                 Patient Instructions:      Diet diabetic     Activity as tolerated       Significant Diagnostic Studies: Labs:   BMP: No results for input(s): GLU, NA, K, CL, CO2, BUN, CREATININE, CALCIUM, MG in the last 48 hours., CMP No results for input(s): NA, K, CL, CO2, GLU, BUN, CREATININE, CALCIUM, PROT, ALBUMIN, BILITOT, ALKPHOS, AST, ALT, ANIONGAP, ESTGFRAFRICA, EGFRNONAA in the last 48 hours., CBC No results for input(s): WBC, HGB, HCT, PLT in the last 48 hours., Troponin   Recent Labs   Lab 08/01/20  1730   TROPONINI 0.012    and All labs within the past 24 hours have been reviewed    Pending Diagnostic Studies:     None         Medications:  Reconciled Home Medications:      Medication List      START taking these medications    HYDROcodone-acetaminophen  mg per tablet  Commonly known as: NORCO  Take 1 tablet by mouth every 4 (four) hours as needed for Pain.        CONTINUE taking these medications    amLODIPine 5 MG tablet  Commonly known as: NORVASC  Take 1 tablet (5 mg total) by mouth once daily.     carvediloL 6.25 MG tablet  Commonly known as: COREG  TAKE (1) TABLET BY MOUTH 2 TIMES A DAY WITH MEALS     PROBIOTIC-10 ORAL  Take by mouth.     tiZANidine 4 MG tablet  Commonly known as: ZANAFLEX  Take 1 tablet (4 mg total) by mouth every 6 (six) hours as needed (HOLD while on CIPRO).     traZODone 50 MG tablet  Commonly known as: DESYREL  TAKE 1 TABLET BY MOUTH NIGHTLY AS NEEDED FOR INSOMNIA     UNABLE TO FIND  Tumeric root extract     valsartan 160 MG tablet  Commonly known as: DIOVAN  Take 0.5 tablets (80 mg total) by mouth 2 (two) times  daily.     vitamin D 1000 units Tab  Commonly known as: VITAMIN D3  Take 1,000 Units by mouth.        STOP taking these medications    hydrALAZINE 10 MG tablet  Commonly known as: APRESOLINE     hydroCHLOROthiazide 12.5 MG Tab  Commonly known as: HYDRODIURIL            Indwelling Lines/Drains at time of discharge:   Lines/Drains/Airways     None                 Time spent on the discharge of patient: 32 minutes  Patient was seen and examined on the date of discharge and determined to be suitable for discharge.         Drew Jewell MD  Department of Hospital Medicine  [facility]

## 2020-08-08 LAB
BACTERIA BLD CULT: NORMAL
BACTERIA BLD CULT: NORMAL

## 2020-09-16 ENCOUNTER — OFFICE VISIT (OUTPATIENT)
Dept: INTERNAL MEDICINE | Facility: CLINIC | Age: 80
End: 2020-09-16
Payer: MEDICARE

## 2020-09-16 ENCOUNTER — LAB VISIT (OUTPATIENT)
Dept: LAB | Facility: HOSPITAL | Age: 80
End: 2020-09-16
Attending: FAMILY MEDICINE
Payer: MEDICARE

## 2020-09-16 ENCOUNTER — TELEPHONE (OUTPATIENT)
Dept: INTERNAL MEDICINE | Facility: CLINIC | Age: 80
End: 2020-09-16

## 2020-09-16 ENCOUNTER — PATIENT MESSAGE (OUTPATIENT)
Dept: INTERNAL MEDICINE | Facility: CLINIC | Age: 80
End: 2020-09-16

## 2020-09-16 VITALS
BODY MASS INDEX: 27.14 KG/M2 | TEMPERATURE: 99 F | DIASTOLIC BLOOD PRESSURE: 82 MMHG | HEART RATE: 70 BPM | SYSTOLIC BLOOD PRESSURE: 184 MMHG | OXYGEN SATURATION: 96 % | HEIGHT: 61 IN | RESPIRATION RATE: 18 BRPM | WEIGHT: 143.75 LBS

## 2020-09-16 DIAGNOSIS — H53.9 VISION CHANGES: ICD-10-CM

## 2020-09-16 DIAGNOSIS — I10 ESSENTIAL HYPERTENSION: Primary | ICD-10-CM

## 2020-09-16 DIAGNOSIS — Z09 HOSPITAL DISCHARGE FOLLOW-UP: Primary | ICD-10-CM

## 2020-09-16 DIAGNOSIS — I10 ESSENTIAL HYPERTENSION: ICD-10-CM

## 2020-09-16 LAB
ALBUMIN SERPL BCP-MCNC: 3.4 G/DL (ref 3.5–5.2)
ALP SERPL-CCNC: 65 U/L (ref 55–135)
ALT SERPL W/O P-5'-P-CCNC: 16 U/L (ref 10–44)
ANION GAP SERPL CALC-SCNC: 8 MMOL/L (ref 8–16)
AST SERPL-CCNC: 19 U/L (ref 10–40)
BILIRUB SERPL-MCNC: 0.7 MG/DL (ref 0.1–1)
BUN SERPL-MCNC: 11 MG/DL (ref 8–23)
CALCIUM SERPL-MCNC: 9.6 MG/DL (ref 8.7–10.5)
CHLORIDE SERPL-SCNC: 105 MMOL/L (ref 95–110)
CHOLEST SERPL-MCNC: 101 MG/DL (ref 120–199)
CHOLEST/HDLC SERPL: 2.2 {RATIO} (ref 2–5)
CO2 SERPL-SCNC: 34 MMOL/L (ref 23–29)
CREAT SERPL-MCNC: 0.7 MG/DL (ref 0.5–1.4)
EST. GFR  (AFRICAN AMERICAN): >60 ML/MIN/1.73 M^2
EST. GFR  (NON AFRICAN AMERICAN): >60 ML/MIN/1.73 M^2
GLUCOSE SERPL-MCNC: 110 MG/DL (ref 70–110)
HDLC SERPL-MCNC: 46 MG/DL (ref 40–75)
HDLC SERPL: 45.5 % (ref 20–50)
LDLC SERPL CALC-MCNC: 37 MG/DL (ref 63–159)
NONHDLC SERPL-MCNC: 55 MG/DL
POTASSIUM SERPL-SCNC: 4 MMOL/L (ref 3.5–5.1)
PROT SERPL-MCNC: 6.8 G/DL (ref 6–8.4)
SODIUM SERPL-SCNC: 147 MMOL/L (ref 136–145)
TRIGL SERPL-MCNC: 90 MG/DL (ref 30–150)

## 2020-09-16 PROCEDURE — 99999 PR PBB SHADOW E&M-EST. PATIENT-LVL IV: CPT | Mod: PBBFAC,,, | Performed by: FAMILY MEDICINE

## 2020-09-16 PROCEDURE — 80061 LIPID PANEL: CPT

## 2020-09-16 PROCEDURE — 36415 COLL VENOUS BLD VENIPUNCTURE: CPT

## 2020-09-16 PROCEDURE — 99999 PR PBB SHADOW E&M-EST. PATIENT-LVL IV: ICD-10-PCS | Mod: PBBFAC,,, | Performed by: FAMILY MEDICINE

## 2020-09-16 PROCEDURE — 99214 OFFICE O/P EST MOD 30 MIN: CPT | Mod: PBBFAC | Performed by: FAMILY MEDICINE

## 2020-09-16 PROCEDURE — 80053 COMPREHEN METABOLIC PANEL: CPT

## 2020-09-16 PROCEDURE — 99214 OFFICE O/P EST MOD 30 MIN: CPT | Mod: S$PBB,,, | Performed by: FAMILY MEDICINE

## 2020-09-16 PROCEDURE — 99214 PR OFFICE/OUTPT VISIT, EST, LEVL IV, 30-39 MIN: ICD-10-PCS | Mod: S$PBB,,, | Performed by: FAMILY MEDICINE

## 2020-09-16 RX ORDER — HYDROCHLOROTHIAZIDE 12.5 MG/1
CAPSULE ORAL
COMMUNITY
Start: 2020-07-27 | End: 2020-10-14

## 2020-09-16 RX ORDER — AMLODIPINE BESYLATE 5 MG/1
5 TABLET ORAL DAILY
Qty: 90 TABLET | Refills: 1 | Status: SHIPPED | OUTPATIENT
Start: 2020-09-16 | End: 2021-03-04

## 2020-09-16 RX ORDER — TAPENTADOL HYDROCHLORIDE 50 MG/1
TABLET, FILM COATED, EXTENDED RELEASE ORAL
COMMUNITY
Start: 2020-06-23 | End: 2020-10-14

## 2020-09-16 RX ORDER — CLOPIDOGREL BISULFATE 75 MG/1
75 TABLET ORAL DAILY
COMMUNITY
End: 2020-09-25 | Stop reason: SDUPTHER

## 2020-09-16 RX ORDER — ATORVASTATIN CALCIUM 40 MG/1
40 TABLET, FILM COATED ORAL DAILY
COMMUNITY
End: 2020-09-25 | Stop reason: SDUPTHER

## 2020-09-16 NOTE — PROGRESS NOTES
Subjective:       Patient ID: Leslie Wood is a 80 y.o. female.    Chief Complaint: Follow-up    HPI Ms. Wood presents today for follow up.     8/1/20 admitted to hospital   Positive for COVID   She was admitted for 3 days.     HPI:   Leslie Wood is an 80 year old female who presented to the ED with complaints of chest congestion. Symptoms started approximately 6 days ago when she began having diarrhea. Approximately two days later, she noted fever as high as 101. She became concerned when she noted chest congestion and mild shortness of breath. There is associated nausea, decreased appetite and abdominal tenderness. She denies known COVID contacts. She works, but is alone in her office and leaves via her own entry. She live with her  who is not ill. In the ED, the patient's COVID test was positive. Her creatinine was elevated to 3 from a previous measurement of 0.9 on 12/13/19. Other labs were essentially negative. Code status was discussed with the patient. She is a full code. Her daughter, Vicki Puentes, is her surrogate medical decision maker.          Hospital Course:   8/3 - Patient much improved with therapy. Patient on Room Air. She reports resolution of diarrhea. No events  Patient stable for a safe discharge to home    Since COVID she has had problems seeing out of the left eye. Her depth perception is off.      While at the rehab she did not feel she was getting enough therapy.     She did not feel challenged.   BP when she came home 200/??  She was not getting her BP daily at the facility     She has not had her BP medication this morning.     Review of Systems   Constitutional: Positive for activity change. Negative for appetite change, fatigue, fever and unexpected weight change.   HENT: Negative.    Eyes: Negative.    Respiratory: Negative.    Cardiovascular: Negative.    Gastrointestinal: Negative.    Genitourinary: Negative.    Musculoskeletal: Positive for arthralgias.    Psychiatric/Behavioral: Negative.            Past Medical History:   Diagnosis Date    Arthritis     Cataract     GERD (gastroesophageal reflux disease)     Hypertension      Past Surgical History:   Procedure Laterality Date    ADENOIDECTOMY      ADRENAL GLAND SURGERY      APPENDECTOMY      BACK SURGERY      fusion l 4-5 s 1,2,3  fusion l 2-3    EYE SURGERY      HEMORRHOID SURGERY      HERNIA REPAIR      HYSTERECTOMY      indirect lumbar decompression      percutaneous placement of interspinous extension blocker    TONSILLECTOMY       Family History   Problem Relation Age of Onset    Heart disease Mother     Hypertension Mother     Diabetes Mother     Hypertension Father     Kidney disease Father      Social History     Socioeconomic History    Marital status:      Spouse name: Not on file    Number of children: Not on file    Years of education: Not on file    Highest education level: Not on file   Occupational History    Not on file   Social Needs    Financial resource strain: Not on file    Food insecurity     Worry: Not on file     Inability: Not on file    Transportation needs     Medical: Not on file     Non-medical: Not on file   Tobacco Use    Smoking status: Never Smoker    Smokeless tobacco: Never Used   Substance and Sexual Activity    Alcohol use: No    Drug use: No    Sexual activity: Not Currently   Lifestyle    Physical activity     Days per week: Not on file     Minutes per session: Not on file    Stress: Not on file   Relationships    Social connections     Talks on phone: Not on file     Gets together: Not on file     Attends Anabaptist service: Not on file     Active member of club or organization: Not on file     Attends meetings of clubs or organizations: Not on file     Relationship status: Not on file   Other Topics Concern    Not on file   Social History Narrative    Not on file       Objective:        Physical Exam  Vitals signs reviewed.    Constitutional:       Appearance: Normal appearance.   HENT:      Head: Normocephalic and atraumatic.   Neck:      Musculoskeletal: Normal range of motion.   Cardiovascular:      Rate and Rhythm: Normal rate.      Pulses:           Dorsalis pedis pulses are 2+ on the right side and 2+ on the left side.        Posterior tibial pulses are 2+ on the right side and 2+ on the left side.   Pulmonary:      Effort: Pulmonary effort is normal.      Breath sounds: Normal breath sounds.   Musculoskeletal:      Right foot: Normal range of motion. No deformity, bunion, Charcot foot, foot drop or prominent metatarsal heads.      Left foot: Normal range of motion. No deformity, bunion, Charcot foot, foot drop or prominent metatarsal heads.   Feet:      Right foot:      Protective Sensation: 6 sites tested. 4 sites sensed.      Left foot:      Protective Sensation: 6 sites tested. 4 sites sensed.   Neurological:      Mental Status: She is alert and oriented to person, place, and time.   Psychiatric:         Mood and Affect: Mood normal.         Behavior: Behavior normal.           Assessment/Plan:     Hospital discharge follow-up    Vision changes  -     Ambulatory referral/consult to Ophthalmology; Future; Expected date: 09/23/2020    Essential hypertension  -     Comprehensive metabolic panel; Future; Expected date: 09/16/2020  -     Lipid Panel; Future; Expected date: 09/16/2020  -     amLODIPine (NORVASC) 5 MG tablet; Take 1 tablet (5 mg total) by mouth once daily.  Dispense: 90 tablet; Refill: 1  -     Cancel: CBC auto differential; Future; Expected date: 09/16/2020      Reviewed notes from nursing home rehab she had normal blood pressures   See care everywhere  Restart amlodipine   Continue to hold HCTZ   Follow up [place] 2 weeks for BP check     Follow up 6 weeks     Angeles Carson MD  [facility]   Family Medicine

## 2020-09-24 ENCOUNTER — TELEPHONE (OUTPATIENT)
Dept: INTERNAL MEDICINE | Facility: CLINIC | Age: 80
End: 2020-09-24

## 2020-09-24 NOTE — TELEPHONE ENCOUNTER
lov 9/16/20  Refill request received for trazodone  Was she to start taking this medication again?

## 2020-09-25 ENCOUNTER — PATIENT MESSAGE (OUTPATIENT)
Dept: INTERNAL MEDICINE | Facility: CLINIC | Age: 80
End: 2020-09-25

## 2020-09-25 DIAGNOSIS — I10 ESSENTIAL HYPERTENSION: ICD-10-CM

## 2020-09-25 RX ORDER — CLOPIDOGREL BISULFATE 75 MG/1
75 TABLET ORAL DAILY
Qty: 90 TABLET | Refills: 3 | Status: SHIPPED | OUTPATIENT
Start: 2020-09-25 | End: 2020-10-14

## 2020-09-25 RX ORDER — VALSARTAN 160 MG/1
80 TABLET ORAL 2 TIMES DAILY
Qty: 180 TABLET | Refills: 3 | Status: SHIPPED | OUTPATIENT
Start: 2020-09-25 | End: 2021-02-11

## 2020-09-25 RX ORDER — CARVEDILOL 6.25 MG/1
TABLET ORAL
Qty: 180 TABLET | Refills: 3 | Status: SHIPPED | OUTPATIENT
Start: 2020-09-25 | End: 2020-10-07 | Stop reason: SDUPTHER

## 2020-09-25 RX ORDER — ATORVASTATIN CALCIUM 40 MG/1
40 TABLET, FILM COATED ORAL DAILY
Qty: 90 TABLET | Refills: 3 | Status: SHIPPED | OUTPATIENT
Start: 2020-09-25 | End: 2020-10-14

## 2020-09-25 NOTE — TELEPHONE ENCOUNTER
----- Message from Danika Russell sent at 9/25/2020  8:58 AM CDT -----  Regarding: Tracie  Would like a call from nurse in regards to medication that weren't filled at the pharmacy . Please call back at .[phone]        Thank you,   Danika Russell

## 2020-09-30 ENCOUNTER — TELEPHONE (OUTPATIENT)
Dept: ADMINISTRATIVE | Facility: HOSPITAL | Age: 80
End: 2020-09-30

## 2020-09-30 ENCOUNTER — LAB VISIT (OUTPATIENT)
Dept: LAB | Facility: HOSPITAL | Age: 80
End: 2020-09-30
Attending: FAMILY MEDICINE
Payer: MEDICARE

## 2020-09-30 ENCOUNTER — TELEPHONE (OUTPATIENT)
Dept: INTERNAL MEDICINE | Facility: CLINIC | Age: 80
End: 2020-09-30

## 2020-09-30 ENCOUNTER — EXTERNAL CHRONIC CARE MANAGEMENT (OUTPATIENT)
Dept: PRIMARY CARE CLINIC | Facility: CLINIC | Age: 80
End: 2020-09-30
Payer: MEDICARE

## 2020-09-30 DIAGNOSIS — I10 ESSENTIAL HYPERTENSION: ICD-10-CM

## 2020-09-30 DIAGNOSIS — R42 LIGHT HEADED: ICD-10-CM

## 2020-09-30 DIAGNOSIS — R42 DIZZINESS: Primary | ICD-10-CM

## 2020-09-30 LAB
BASOPHILS # BLD AUTO: 0.03 K/UL (ref 0–0.2)
BASOPHILS NFR BLD: 0.5 % (ref 0–1.9)
DIFFERENTIAL METHOD: ABNORMAL
EOSINOPHIL # BLD AUTO: 0.1 K/UL (ref 0–0.5)
EOSINOPHIL NFR BLD: 1.8 % (ref 0–8)
ERYTHROCYTE [DISTWIDTH] IN BLOOD BY AUTOMATED COUNT: 15.9 % (ref 11.5–14.5)
HCT VFR BLD AUTO: 38.8 % (ref 37–48.5)
HGB BLD-MCNC: 11.8 G/DL (ref 12–16)
IMM GRANULOCYTES # BLD AUTO: 0.01 K/UL (ref 0–0.04)
IMM GRANULOCYTES NFR BLD AUTO: 0.2 % (ref 0–0.5)
LYMPHOCYTES # BLD AUTO: 2.1 K/UL (ref 1–4.8)
LYMPHOCYTES NFR BLD: 32.5 % (ref 18–48)
MCH RBC QN AUTO: 28.6 PG (ref 27–31)
MCHC RBC AUTO-ENTMCNC: 30.4 G/DL (ref 32–36)
MCV RBC AUTO: 94 FL (ref 82–98)
MONOCYTES # BLD AUTO: 0.7 K/UL (ref 0.3–1)
MONOCYTES NFR BLD: 10.9 % (ref 4–15)
NEUTROPHILS # BLD AUTO: 3.6 K/UL (ref 1.8–7.7)
NEUTROPHILS NFR BLD: 54.1 % (ref 38–73)
NRBC BLD-RTO: 0 /100 WBC
PLATELET # BLD AUTO: 263 K/UL (ref 150–350)
PMV BLD AUTO: 10 FL (ref 9.2–12.9)
RBC # BLD AUTO: 4.12 M/UL (ref 4–5.4)
WBC # BLD AUTO: 6.58 K/UL (ref 3.9–12.7)

## 2020-09-30 PROCEDURE — 99490 CHRNC CARE MGMT STAFF 1ST 20: CPT | Mod: PBBFAC | Performed by: FAMILY MEDICINE

## 2020-09-30 PROCEDURE — 36415 COLL VENOUS BLD VENIPUNCTURE: CPT

## 2020-09-30 PROCEDURE — 99490 CHRNC CARE MGMT STAFF 1ST 20: CPT | Mod: S$PBB,,, | Performed by: FAMILY MEDICINE

## 2020-09-30 PROCEDURE — 85025 COMPLETE CBC W/AUTO DIFF WBC: CPT

## 2020-09-30 PROCEDURE — 99490 PR CHRONIC CARE MGMT, 1ST 20 MIN: ICD-10-PCS | Mod: S$PBB,,, | Performed by: FAMILY MEDICINE

## 2020-09-30 NOTE — TELEPHONE ENCOUNTER
----- Message from Danika Russell sent at 9/29/2020  4:23 PM CDT -----  Regarding: pt granddaughter  Would like a call from nurse in regards to pt's medication . Caller stated th pt's blood pressure was 132/60 today . Please call back at [phone].         Thank you,   Danika Russell

## 2020-09-30 NOTE — TELEPHONE ENCOUNTER
Pt grand daughter requesting an referral to cardio and neuro , she also stated she's dizzy and light headed she forgot to ask about referral at visit.

## 2020-10-01 ENCOUNTER — OFFICE VISIT (OUTPATIENT)
Dept: CARDIOLOGY | Facility: CLINIC | Age: 80
End: 2020-10-01
Payer: MEDICARE

## 2020-10-01 ENCOUNTER — PATIENT MESSAGE (OUTPATIENT)
Dept: OTHER | Facility: OTHER | Age: 80
End: 2020-10-01

## 2020-10-01 ENCOUNTER — TELEPHONE (OUTPATIENT)
Dept: CARDIOLOGY | Facility: CLINIC | Age: 80
End: 2020-10-01

## 2020-10-01 VITALS
BODY MASS INDEX: 27.41 KG/M2 | WEIGHT: 145.06 LBS | OXYGEN SATURATION: 95 % | SYSTOLIC BLOOD PRESSURE: 140 MMHG | DIASTOLIC BLOOD PRESSURE: 62 MMHG | HEART RATE: 60 BPM

## 2020-10-01 DIAGNOSIS — I63.9 CEREBROVASCULAR ACCIDENT (CVA), UNSPECIFIED MECHANISM: Primary | ICD-10-CM

## 2020-10-01 DIAGNOSIS — R42 LIGHT HEADED: ICD-10-CM

## 2020-10-01 DIAGNOSIS — R42 DIZZINESS: ICD-10-CM

## 2020-10-01 DIAGNOSIS — Z87.898 HISTORY OF PREDIABETES: ICD-10-CM

## 2020-10-01 DIAGNOSIS — E78.5 HYPERLIPIDEMIA, UNSPECIFIED HYPERLIPIDEMIA TYPE: ICD-10-CM

## 2020-10-01 DIAGNOSIS — I10 ESSENTIAL HYPERTENSION: ICD-10-CM

## 2020-10-01 PROCEDURE — 99215 PR OFFICE/OUTPT VISIT, EST, LEVL V, 40-54 MIN: ICD-10-PCS | Mod: S$PBB,,, | Performed by: INTERNAL MEDICINE

## 2020-10-01 PROCEDURE — 99215 OFFICE O/P EST HI 40 MIN: CPT | Mod: PBBFAC | Performed by: INTERNAL MEDICINE

## 2020-10-01 PROCEDURE — 99999 PR PBB SHADOW E&M-EST. PATIENT-LVL V: CPT | Mod: PBBFAC,,, | Performed by: INTERNAL MEDICINE

## 2020-10-01 PROCEDURE — 99999 PR PBB SHADOW E&M-EST. PATIENT-LVL V: ICD-10-PCS | Mod: PBBFAC,,, | Performed by: INTERNAL MEDICINE

## 2020-10-01 PROCEDURE — 99215 OFFICE O/P EST HI 40 MIN: CPT | Mod: S$PBB,,, | Performed by: INTERNAL MEDICINE

## 2020-10-01 NOTE — PROGRESS NOTES
Subjective:   Patient ID:  Leslie Wood is a 80 y.o. female who presents for evaluation of Headache, Leg Swelling, and Dizziness      Patient 1-year-old, prediabetic, with history of hypertension, who recently had a stroke about 3 months ago and was hospitalized at [facility].  She does not recall what kind of cardiac workup she underwent while at that hospital.  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  Her left-sided weakness has improved since the stroke.  She is undergoing rehab.  And should undergo speech therapy soon as she states.  She reports compliance with her blood pressure medications, however she reports feeling dizzy when she stands up, she does not get dizzy or any other circumstances.  She states that she is not drinking enough water.  Also she complains of bilateral lower extremity mild swelling that comes on and off when she takes or not take her amlodipine.  She denies any chest pain, dyspnea, orthopnea, PND, palpitations, syncope, presyncope, bleeding.      Past Medical History:   Diagnosis Date    Arthritis     Cataract     GERD (gastroesophageal reflux disease)     Hypertension     Stroke        Past Surgical History:   Procedure Laterality Date    ADENOIDECTOMY      ADRENAL GLAND SURGERY      APPENDECTOMY      BACK SURGERY      fusion l 4-5 s 1,2,3  fusion l 2-3    EYE SURGERY      HEMORRHOID SURGERY      HERNIA REPAIR      HYSTERECTOMY      indirect lumbar decompression      percutaneous placement of interspinous extension blocker    TONSILLECTOMY         Social History     Tobacco Use    Smoking status: Never Smoker    Smokeless tobacco: Never Used   Substance Use Topics    Alcohol use: No    Drug use: No       Family History   Problem Relation Age of Onset    Heart disease Mother     Hypertension Mother     Diabetes Mother     Hypertension Father     Kidney disease Father        Review of Systems   Constitution: Negative for fever and  malaise/fatigue.   HENT: Negative for sore throat.    Eyes: Negative for blurred vision.   Cardiovascular: Positive for leg swelling. Negative for chest pain, claudication, cyanosis, dyspnea on exertion, irregular heartbeat, near-syncope, orthopnea, palpitations, paroxysmal nocturnal dyspnea and syncope.   Respiratory: Negative for cough, hemoptysis and shortness of breath.    Hematologic/Lymphatic: Negative for bleeding problem.   Skin: Negative for poor wound healing and rash.   Musculoskeletal: Negative for back pain and falls.   Gastrointestinal: Negative for abdominal pain.   Genitourinary: Negative for nocturia.   Neurological: Positive for dizziness and focal weakness. Negative for headaches, light-headedness and loss of balance.   Psychiatric/Behavioral: Negative for altered mental status and substance abuse.       Current Outpatient Medications on File Prior to Visit   Medication Sig    amLODIPine (NORVASC) 5 MG tablet Take 1 tablet (5 mg total) by mouth once daily.    atorvastatin (LIPITOR) 40 MG tablet Take 1 tablet (40 mg total) by mouth once daily.    carvediloL (COREG) 6.25 MG tablet TAKE (1) TABLET BY MOUTH 2 TIMES A DAY WITH MEALS    clopidogreL (PLAVIX) 75 mg tablet Take 1 tablet (75 mg total) by mouth once daily.    valsartan (DIOVAN) 160 MG tablet Take 0.5 tablets (80 mg total) by mouth 2 (two) times daily.    hydroCHLOROthiazide (MICROZIDE) 12.5 mg capsule     HYDROcodone-acetaminophen (NORCO)  mg per tablet Take 1 tablet by mouth every 4 (four) hours as needed for Pain. (Patient not taking: Reported on 8/5/2020)    Lactobac no.41/Bifidobact no.7 (PROBIOTIC-10 ORAL) Take by mouth.    NUCYNTA ER 50 mg Tb12     tizanidine (ZANAFLEX) 4 MG tablet Take 1 tablet (4 mg total) by mouth every 6 (six) hours as needed (HOLD while on CIPRO). (Patient not taking: Reported on 9/16/2020)    traMADoL (ULTRAM) 50 mg tablet Take 50 mg by mouth 2 (two) times a day.    traZODone (DESYREL) 50 MG  tablet TAKE 1 TABLET AT BEDTIME AS NEEDED FOR INSOMNIA (Patient not taking: Reported on 9/16/2020)    UNABLE TO FIND Tumeric root extract    vitamin D (VITAMIN D3) 1000 units Tab Take 1,000 Units by mouth.     No current facility-administered medications on file prior to visit.        Objective:   Objective:  Wt Readings from Last 3 Encounters:   10/01/20 65.8 kg (145 lb 1 oz)   09/16/20 65.2 kg (143 lb 11.8 oz)   08/04/20 68.9 kg (151 lb 14.4 oz)     Temp Readings from Last 3 Encounters:   09/16/20 98.5 °F (36.9 °C) (Tympanic)   08/04/20 98.3 °F (36.8 °C)   01/24/20 97.4 °F (36.3 °C) (Tympanic)     BP Readings from Last 3 Encounters:   10/01/20 (!) 140/62   09/16/20 (!) 184/82   08/04/20 (!) 182/79     Pulse Readings from Last 3 Encounters:   10/01/20 60   09/16/20 70   08/04/20 61       Physical Exam   Constitutional: She is oriented to person, place, and time. She appears well-developed and well-nourished.   HENT:   Head: Normocephalic and atraumatic.   Eyes: Conjunctivae are normal. No scleral icterus.   Neck: Normal range of motion. Neck supple.   Cardiovascular: Normal rate, regular rhythm, normal heart sounds and intact distal pulses.   No murmur heard.  Pulmonary/Chest: No respiratory distress. She has no wheezes. She has no rales. She exhibits no tenderness.   Abdominal: Soft. Bowel sounds are normal. She exhibits no distension. There is no guarding.   Musculoskeletal: Normal range of motion.   Neurological: She is alert and oriented to person, place, and time.   Skin: Skin is warm.   Psychiatric: She has a normal mood and affect.   Vitals reviewed.      Lab Results   Component Value Date    CHOL 101 (L) 09/16/2020     Lab Results   Component Value Date    HDL 46 09/16/2020     Lab Results   Component Value Date    LDLCALC 37.0 (L) 09/16/2020     Lab Results   Component Value Date    TRIG 90 09/16/2020     Lab Results   Component Value Date    CHOLHDL 45.5 09/16/2020       Chemistry        Component Value  Date/Time     (H) 09/16/2020 1013    K 4.0 09/16/2020 1013     09/16/2020 1013    CO2 34 (H) 09/16/2020 1013    BUN 11 09/16/2020 1013    CREATININE 0.7 09/16/2020 1013     09/16/2020 1013        Component Value Date/Time    CALCIUM 9.6 09/16/2020 1013    ALKPHOS 65 09/16/2020 1013    AST 19 09/16/2020 1013    ALT 16 09/16/2020 1013    BILITOT 0.7 09/16/2020 1013    ESTGFRAFRICA >60.0 09/16/2020 1013    EGFRNONAA >60.0 09/16/2020 1013          Lab Results   Component Value Date    TSH 1.549 12/02/2019     Lab Results   Component Value Date    INR 1.0 08/28/2013     Lab Results   Component Value Date    WBC 6.58 09/30/2020    HGB 11.8 (L) 09/30/2020    HCT 38.8 09/30/2020    MCV 94 09/30/2020     09/30/2020     BNP  @LABRCNTIP(BNP,BNPTRIAGEBLO)@  CrCl cannot be calculated (Patient's most recent lab result is older than the maximum 7 days allowed.).     Imaging:  ======  Results for orders placed during the hospital encounter of 03/02/20   Echo Color Flow Doppler? Yes    Narrative · Mild concentric left ventricular hypertrophy.  · Normal left ventricular systolic function. The estimated ejection   fraction is 60%.  · Normal LV diastolic function.  · Normal right ventricular systolic function.  · Mild aortic regurgitation.  · Normal central venous pressure (3 mmHg).  · Trivial pericardial effusion.        No results found for this or any previous visit.  No results found for this or any previous visit.  Results for orders placed during the hospital encounter of 05/11/17   X-Ray Chest PA And Lateral    Narrative XR CHEST PA AND LATERAL, 05/11/17 11:38:46    Clinical indication: Chest pain.    Findings:  Comparison with 05/10/2017.    Epidural leads within the dorsal thoracic spine.  Lower thoracic wedge compression fracture status post kyphoplasty.  Left upper quadrant surgical clips.    Heart size is normal.  Prominent left pericardial fat pad.    Aortic arch calcification.    Lung fields  remain clear.    Impression      Stable chest x-ray.  No acute findings.      Electronically signed by: EMERY SAMAYAO MD  Date:     05/11/17  Time:    12:07      No results found for this or any previous visit.  No procedure found.    Diagnostic Results:  ECG: Reviewed    The ASCVD Risk score (Craigvillearnel GRULLON Jr., et al., 2013) failed to calculate for the following reasons:    The 2013 ASCVD risk score is only valid for ages 40 to 79    The patient has a prior MI or stroke diagnosis    Assessment and Plan:   Cerebrovascular accident (CVA), unspecified mechanism  Comments:  Supposed to follow with a neurologist in 2 weeks.  Will request records from [facility] regarding her recent admission for a stroke 3 months ago before she got COVID    Dizziness  Comments:  Only orthostatic by clinical history, recommended p.o. hydration, compression stockings,  Orders:  -     Ambulatory referral/consult to Cardiology    Light headed  -     Ambulatory referral/consult to Cardiology    Hyperlipidemia, unspecified hyperlipidemia type  Comments:  Continue with statins    Essential hypertension  Comments:  Currently controlled, continue with same medications.  Amlodipine, HCTZ, Coreg, valsartan.    History of prediabetes        Follow up in 4 months

## 2020-10-01 NOTE — LETTER
October 1, 2020      Angeles Carson MD  [address] [place] [address] [address]y [place] [id number]           [facility]  [address] [place] [address] [address] [place] [address]  Phone: [phone]  Fax: [phone]          Patient: Leslie Wood   MR Number: [id number]   YOB: 1940   Date of Visit: 10/1/2020       Dear Dr. Angeles Carson:    Thank you for referring Leslie Wood to me for evaluation. Attached you will find relevant portions of my assessment and plan of care.    If you have questions, please do not hesitate to call me. I look forward to following Leslie Wood along with you.    Sincerely,    Domingo Martins MD    Enclosure  CC:  No Recipients    If you would like to receive this communication electronically, please contact [email or web address] or [phone] to request more information on [employer] access.    For providers and/or their staff who would like to refer a patient to [facility], please contact us through our one-stop-shop provider referral line, [facility] , at [phone].    If you feel you have received this communication in error or would no longer like to receive these types of communications, please e-mail [email or web address]

## 2020-10-01 NOTE — TELEPHONE ENCOUNTER
I have attempted without success to contact this patient by phone to confirm appt. No voicemail set up

## 2020-10-06 ENCOUNTER — TELEPHONE (OUTPATIENT)
Dept: INTERNAL MEDICINE | Facility: CLINIC | Age: 80
End: 2020-10-06

## 2020-10-06 DIAGNOSIS — I10 ESSENTIAL HYPERTENSION: ICD-10-CM

## 2020-10-06 NOTE — TELEPHONE ENCOUNTER
----- Message from Rosalinda Clark sent at 10/6/2020  9:48 AM CDT -----  Contact: Brunilda/ [facility]  Type:  RX Refill Request    Who Called: Brunilda  Refill or New Rx: refill  RX Name and Strength: Carvedilol 12.5 mg  How is the patient currently taking it? (ex. 1XDay): 2XDay  Is this a 30 day or 90 day RX: 30  Preferred Pharmacy with phone number:  [facility] [address] [facility]  [address] [address] [address]  Phone: [phone] Fax: [phone]  Local or Mail Order: local  Ordering Provider: Alli  Would the patient rather a call back or a response via [facility]? Call back  Best Call Back Number: [phone]  Additional Information:  n/a

## 2020-10-06 NOTE — TELEPHONE ENCOUNTER
Patient requesting new rx carvidelol 12.5mg 2x a day to filled in. Patient stated she was advised to continue taking rx prescribed from hospital as per Brunilda states. Please advise right dose and direction for patient Carvedilol.

## 2020-10-07 RX ORDER — CARVEDILOL 12.5 MG/1
TABLET ORAL
Qty: 180 TABLET | Refills: 1 | Status: SHIPPED | OUTPATIENT
Start: 2020-10-07 | End: 2020-11-17 | Stop reason: SDUPTHER

## 2020-10-12 ENCOUNTER — PATIENT OUTREACH (OUTPATIENT)
Dept: ADMINISTRATIVE | Facility: OTHER | Age: 80
End: 2020-10-12

## 2020-10-12 NOTE — PROGRESS NOTES
Health Maintenance Due   Topic Date Due    Foot Exam  06/23/1950    Shingles Vaccine (1 of 2) 06/23/1988     Updates were requested from care everywhere.  Chart was reviewed for overdue Proactive [facility] Encounters (ANGELICA) topics (CRS, Breast Cancer Screening, Eye exam)  Health Maintenance has been updated.  LINKS immunization registry triggered.  Immunizations were reconciled.

## 2020-10-14 ENCOUNTER — OFFICE VISIT (OUTPATIENT)
Dept: NEUROLOGY | Facility: CLINIC | Age: 80
End: 2020-10-14
Payer: MEDICARE

## 2020-10-14 VITALS
TEMPERATURE: 98 F | OXYGEN SATURATION: 97 % | HEIGHT: 61 IN | WEIGHT: 144.38 LBS | RESPIRATION RATE: 16 BRPM | SYSTOLIC BLOOD PRESSURE: 130 MMHG | DIASTOLIC BLOOD PRESSURE: 80 MMHG | BODY MASS INDEX: 27.26 KG/M2 | HEART RATE: 60 BPM

## 2020-10-14 DIAGNOSIS — H53.462 HEMIANOPIA OF LEFT EYE: ICD-10-CM

## 2020-10-14 DIAGNOSIS — R42 LIGHT HEADED: ICD-10-CM

## 2020-10-14 DIAGNOSIS — I10 ESSENTIAL HYPERTENSION: ICD-10-CM

## 2020-10-14 DIAGNOSIS — I69.30 LATE EFFECT OF CEREBROVASCULAR ACCIDENT (CVA): Primary | ICD-10-CM

## 2020-10-14 PROCEDURE — 99214 OFFICE O/P EST MOD 30 MIN: CPT | Mod: PBBFAC | Performed by: PHYSICIAN ASSISTANT

## 2020-10-14 PROCEDURE — 99999 PR STA SHADOW: CPT | Mod: PBBFAC,,, | Performed by: PHYSICIAN ASSISTANT

## 2020-10-14 PROCEDURE — 99999 PR STA SHADOW: ICD-10-PCS | Mod: PBBFAC,,, | Performed by: PHYSICIAN ASSISTANT

## 2020-10-14 PROCEDURE — 99999 PR PBB SHADOW E&M-EST. PATIENT-LVL IV: CPT | Mod: PBBFAC,,, | Performed by: PHYSICIAN ASSISTANT

## 2020-10-14 PROCEDURE — 99204 OFFICE O/P NEW MOD 45 MIN: CPT | Mod: S$PBB | Performed by: PHYSICIAN ASSISTANT

## 2020-10-14 RX ORDER — ASPIRIN 81 MG/1
81 TABLET ORAL DAILY
Status: ON HOLD | COMMUNITY
End: 2022-11-10 | Stop reason: HOSPADM

## 2020-10-14 NOTE — LETTER
October 14, 2020      Angeles Carson MD  [id number] [place] [address] Dr Iggy HARRIS [id number]           [facility]  [address] [facility] [address]  Phone: [phone]  Fax: [phone]          Patient: Leslie Wood   MR Number: [id number]   YOB: 1940   Date of Visit: 10/14/2020       Dear Dr. Angeles Carson:    Thank you for referring Leslie Wood to me for evaluation. Attached you will find relevant portions of my assessment and plan of care.    If you have questions, please do not hesitate to call me. I look forward to following Leslie Wood along with you.    Sincerely,    VANNESSA Aguilera    Enclosure  CC:  No Recipients    If you would like to receive this communication electronically, please contact [email or web address] or [phone] to request more information on [employer] Link access.    For providers and/or their staff who would like to refer a patient to [facility], please contact us through our one-stop-shop provider referral line, [facility] , at [phone].    If you feel you have received this communication in error or would no longer like to receive these types of communications, please e-mail [email or web address]

## 2020-10-14 NOTE — PROGRESS NOTES
Subjective:       Patient ID: Leslie Wood is a 80 y.o. female.    Chief Complaint: Stroke and Establish Care    HPI  Leslie Wood is a 80 y.o. female is here for initial visit. She is here for follow up after CVA in August 2020. She had Covid infection and was hospitalized at [facility] for a few days in early August. She was discharged to home. A day or two later, she had a stroke and was sent to [facility], where her  was hospitalized with Covid.     She had vision loss, leg weakness, speech problems. She recalls spending time at [facility]. She was discharged to a nursing home for rehabilitation for a month or so. She slowly rehabilitated. She now has left lateral visual field cut affecting left eye mostly. She is walking, mostly with a rollator. Gait is improving and she is adapting to vision issues. She is able to read again. She is no longer running in to things with the walker. Speech is almost back to baseline. She has mild cognitive issues, mostly word finding difficulty. She does lose train of thought at times. She was previously very physically and mentally active. She worked 6 days per week until she contracted Covid and had the stroke. She has her own business. This has been very difficult for her.    She is not having swallowing issues.    She has long standing hypertension. She was recently placed on amlodipine 5 mg. She has ankle and lower leg swelling. This is bothersome to her.     I have no records for review. I assume she had full stroke work up. She has seen cardiologist in follow up since CVA rehab discharge. [facility] cardiology note reviewed.    Past Medical History:   Diagnosis Date    Arthritis     Cataract     GERD (gastroesophageal reflux disease)     Hypertension     Stroke        Past Surgical History:   Procedure Laterality Date    ADENOIDECTOMY      ADRENAL GLAND SURGERY      APPENDECTOMY      BACK SURGERY      fusion l 4-5 s 1,2,3  fusion  l 2-3    EYE SURGERY      HEMORRHOID SURGERY      HERNIA REPAIR      HYSTERECTOMY      indirect lumbar decompression      percutaneous placement of interspinous extension blocker    TONSILLECTOMY         Family History   Problem Relation Age of Onset    Heart disease Mother     Hypertension Mother     Diabetes Mother     Hypertension Father     Kidney disease Father        Social History     Socioeconomic History    Marital status:      Spouse name: Not on file    Number of children: Not on file    Years of education: Not on file    Highest education level: Not on file   Occupational History    Not on file   Social Needs    Financial resource strain: Not on file    Food insecurity     Worry: Not on file     Inability: Not on file    Transportation needs     Medical: Not on file     Non-medical: Not on file   Tobacco Use    Smoking status: Never Smoker    Smokeless tobacco: Never Used   Substance and Sexual Activity    Alcohol use: No    Drug use: No    Sexual activity: Not Currently   Lifestyle    Physical activity     Days per week: Not on file     Minutes per session: Not on file    Stress: Not on file   Relationships    Social connections     Talks on phone: Not on file     Gets together: Not on file     Attends Restorationism service: Not on file     Active member of club or organization: Not on file     Attends meetings of clubs or organizations: Not on file     Relationship status: Not on file   Other Topics Concern    Not on file   Social History Narrative    Not on file       Current Outpatient Medications   Medication Sig Dispense Refill    amLODIPine (NORVASC) 5 MG tablet Take 1 tablet (5 mg total) by mouth once daily. 90 tablet 1    aspirin (ECOTRIN) 81 MG EC tablet Take 81 mg by mouth once daily.      carvediloL (COREG) 12.5 MG tablet TAKE (1) TABLET BY MOUTH 2 TIMES A DAY WITH MEALS (Patient taking differently: 6.25 mg 2 (two) times daily. TAKE (1) TABLET BY MOUTH 2  TIMES A DAY WITH MEALS) 180 tablet 1    clopidogreL (PLAVIX) 75 mg tablet Take 1 tablet (75 mg total) by mouth once daily. 90 tablet 3    traZODone (DESYREL) 50 MG tablet TAKE 1 TABLET AT BEDTIME AS NEEDED FOR INSOMNIA 90 tablet 0    valsartan (DIOVAN) 160 MG tablet Take 0.5 tablets (80 mg total) by mouth 2 (two) times daily. 180 tablet 3    atorvastatin (LIPITOR) 40 MG tablet Take 1 tablet (40 mg total) by mouth once daily. (Patient not taking: Reported on 10/14/2020) 90 tablet 3    hydroCHLOROthiazide (MICROZIDE) 12.5 mg capsule       HYDROcodone-acetaminophen (NORCO)  mg per tablet Take 1 tablet by mouth every 4 (four) hours as needed for Pain. (Patient not taking: Reported on 10/14/2020) 20 tablet 0    Lactobac no.41/Bifidobact no.7 (PROBIOTIC-10 ORAL) Take by mouth.      NUCYNTA ER 50 mg Tb12       tizanidine (ZANAFLEX) 4 MG tablet Take 1 tablet (4 mg total) by mouth every 6 (six) hours as needed (HOLD while on CIPRO). (Patient not taking: Reported on 9/16/2020)      traMADoL (ULTRAM) 50 mg tablet Take 50 mg by mouth 2 (two) times a day.      UNABLE TO FIND Tumeric root extract      vitamin D (VITAMIN D3) 1000 units Tab Take 1,000 Units by mouth.       No current facility-administered medications for this visit.        Review of patient's allergies indicates:   Allergen Reactions    Amitriptyline     Hydrochlorothiazide      Causes muscle cramping    Lisinopril      hyperkalemia    Percocet [oxycodone-acetaminophen] Other (See Comments)     Seizures    Codeine Nausea Only and Rash       Review of Systems   Constitutional: Positive for activity change and fatigue. Negative for appetite change and fever.   HENT: Negative for sore throat.    Eyes: Positive for discharge (tearing) and visual disturbance (left sided neglect).   Respiratory: Negative for cough and shortness of breath.    Cardiovascular: Negative for chest pain.   Gastrointestinal: Negative for nausea and vomiting.   Endocrine:  Negative for cold intolerance and heat intolerance.   Genitourinary: Negative for difficulty urinating.   Musculoskeletal: Positive for gait problem. Negative for arthralgias, back pain and neck pain.   Skin: Negative for rash.   Allergic/Immunologic: Negative for food allergies.   Neurological: Positive for speech difficulty. Negative for dizziness, tremors, seizures, weakness, numbness and headaches.        Word finding difficulty     Hematological: Does not bruise/bleed easily.   Psychiatric/Behavioral: Negative for agitation, decreased concentration, dysphoric mood and sleep disturbance. The patient is not nervous/anxious.        Objective:      Neurologic Exam     Mental Status   Oriented to person, place, and time.     Gait, Coordination, and Reflexes     Reflexes   Right brachioradialis: 2+  Left brachioradialis: 2+  Right biceps: 1+  Left biceps: 1+  Right triceps: 1+  Left triceps: 1+  Right patellar: 1+  Left patellar: 1+  Right achilles: 0  Left achilles: 0    Physical Exam  Eyes:      General: Visual field deficit (left hemianopia) present.   Neurological:      Mental Status: She is alert and oriented to person, place, and time.      Cranial Nerves: Cranial nerve deficit present. No dysarthria or facial asymmetry.      Sensory: Sensation is intact.      Motor: Weakness present. No tremor, atrophy or abnormal muscle tone.      Coordination: Coordination normal. Heel to Shin Test normal. Rapid alternating movements normal.      Gait: Gait (slow and cautious, but normal) normal.      Deep Tendon Reflexes:      Reflex Scores:       Tricep reflexes are 1+ on the right side and 1+ on the left side.       Bicep reflexes are 1+ on the right side and 1+ on the left side.       Brachioradialis reflexes are 2+ on the right side and 2+ on the left side.       Patellar reflexes are 1+ on the right side and 1+ on the left side.       Achilles reflexes are 0 on the right side and 0 on the left side.        Assessment:        1. Late effect of cerebrovascular accident (CVA)    2. Hemianopia of left eye    3. Light headed    4. Essential hypertension        Plan:   Late effect of cerebrovascular accident (CVA)  Likely secondary to Covid 19 hypercoaguable state.  Stop Plavix. She was aspirin naive at time of CVA  Continue ASA 81 mg  Records form [facility], Echo, Carotid studies, Brain MRI, discharge summary    Hemianopia of left eye  2/2 CVA, probably as much improvement as she can expect.  Request records from ophthalmology  Light headed  -     Ambulatory referral/consult to Neurology  This has improved.    Essential hypertension  Swelling from Norvas.  She will discuss with either cardiology or PCP where she can have closer follow up  She lives in [place], too far to come in for blood pressure checks here.  She will need alternative therapy if stops Norvasc. Pressure not at goal.    Follow up here in 4 months.    Discussed risks and benefits of potential treatment options at length as well as potential side effects of medication changes. Medications were changed. Please refer to medication reconciliation report for details. Medication compliance discussed. Instructed to call clinic if concerned or to ED if emergency.    VANNESSA Ordaz

## 2020-10-28 ENCOUNTER — HOSPITAL ENCOUNTER (EMERGENCY)
Facility: HOSPITAL | Age: 80
Discharge: HOME | End: 2020-10-28
Attending: EMERGENCY MEDICINE
Payer: MEDICARE

## 2020-10-28 VITALS
DIASTOLIC BLOOD PRESSURE: 54 MMHG | RESPIRATION RATE: 16 BRPM | HEART RATE: 49 BPM | OXYGEN SATURATION: 93 % | TEMPERATURE: 98 F | SYSTOLIC BLOOD PRESSURE: 112 MMHG

## 2020-10-28 DIAGNOSIS — R11.2 NAUSEA & VOMITING: Primary | ICD-10-CM

## 2020-10-28 LAB
ALBUMIN SERPL BCP-MCNC: 4.4 G/DL (ref 3.5–5.2)
ALP SERPL-CCNC: 71 U/L (ref 55–135)
ALT SERPL W/O P-5'-P-CCNC: 16 U/L (ref 10–44)
ANION GAP SERPL CALC-SCNC: 11 MMOL/L (ref 8–16)
AST SERPL-CCNC: 21 U/L (ref 10–40)
BASOPHILS # BLD AUTO: 0.03 K/UL (ref 0–0.2)
BASOPHILS NFR BLD: 0.4 % (ref 0–1.9)
BILIRUB SERPL-MCNC: 0.6 MG/DL (ref 0.1–1)
BILIRUB UR QL STRIP: NEGATIVE
BUN SERPL-MCNC: 27 MG/DL (ref 8–23)
CALCIUM SERPL-MCNC: 10.5 MG/DL (ref 8.7–10.5)
CHLORIDE SERPL-SCNC: 103 MMOL/L (ref 95–110)
CLARITY UR: CLEAR
CO2 SERPL-SCNC: 30 MMOL/L (ref 23–29)
COLOR UR: YELLOW
CREAT SERPL-MCNC: 1 MG/DL (ref 0.5–1.4)
DIFFERENTIAL METHOD: ABNORMAL
EOSINOPHIL # BLD AUTO: 0.1 K/UL (ref 0–0.5)
EOSINOPHIL NFR BLD: 0.8 % (ref 0–8)
ERYTHROCYTE [DISTWIDTH] IN BLOOD BY AUTOMATED COUNT: 14.6 % (ref 11.5–14.5)
EST. GFR  (AFRICAN AMERICAN): >60 ML/MIN/1.73 M^2
EST. GFR  (NON AFRICAN AMERICAN): 53 ML/MIN/1.73 M^2
GLUCOSE SERPL-MCNC: 143 MG/DL (ref 70–110)
GLUCOSE UR QL STRIP: NEGATIVE
HCT VFR BLD AUTO: 43.6 % (ref 37–48.5)
HGB BLD-MCNC: 13.7 G/DL (ref 12–16)
HGB UR QL STRIP: NEGATIVE
IMM GRANULOCYTES # BLD AUTO: 0.03 K/UL (ref 0–0.04)
IMM GRANULOCYTES NFR BLD AUTO: 0.4 % (ref 0–0.5)
KETONES UR QL STRIP: NEGATIVE
LEUKOCYTE ESTERASE UR QL STRIP: NEGATIVE
LIPASE SERPL-CCNC: 30 U/L (ref 4–60)
LYMPHOCYTES # BLD AUTO: 1.7 K/UL (ref 1–4.8)
LYMPHOCYTES NFR BLD: 22.8 % (ref 18–48)
MAGNESIUM SERPL-MCNC: 1.9 MG/DL (ref 1.6–2.6)
MCH RBC QN AUTO: 28.4 PG (ref 27–31)
MCHC RBC AUTO-ENTMCNC: 31.4 G/DL (ref 32–36)
MCV RBC AUTO: 90 FL (ref 82–98)
MONOCYTES # BLD AUTO: 0.5 K/UL (ref 0.3–1)
MONOCYTES NFR BLD: 7 % (ref 4–15)
NEUTROPHILS # BLD AUTO: 5 K/UL (ref 1.8–7.7)
NEUTROPHILS NFR BLD: 68.6 % (ref 38–73)
NITRITE UR QL STRIP: NEGATIVE
NRBC BLD-RTO: 0 /100 WBC
PH UR STRIP: 6 [PH] (ref 5–8)
PLATELET # BLD AUTO: 251 K/UL (ref 150–350)
PMV BLD AUTO: 9.7 FL (ref 9.2–12.9)
POTASSIUM SERPL-SCNC: 4.1 MMOL/L (ref 3.5–5.1)
PROT SERPL-MCNC: 8.1 G/DL (ref 6–8.4)
PROT UR QL STRIP: ABNORMAL
RBC # BLD AUTO: 4.83 M/UL (ref 4–5.4)
SODIUM SERPL-SCNC: 144 MMOL/L (ref 136–145)
SP GR UR STRIP: >=1.03 (ref 1–1.03)
URN SPEC COLLECT METH UR: ABNORMAL
UROBILINOGEN UR STRIP-ACNC: NEGATIVE EU/DL
WBC # BLD AUTO: 7.29 K/UL (ref 3.9–12.7)

## 2020-10-28 PROCEDURE — 83735 ASSAY OF MAGNESIUM: CPT

## 2020-10-28 PROCEDURE — 81003 URINALYSIS AUTO W/O SCOPE: CPT

## 2020-10-28 PROCEDURE — 96365 THER/PROPH/DIAG IV INF INIT: CPT

## 2020-10-28 PROCEDURE — 83690 ASSAY OF LIPASE: CPT

## 2020-10-28 PROCEDURE — 93010 ELECTROCARDIOGRAM REPORT: CPT | Mod: ,,, | Performed by: INTERNAL MEDICINE

## 2020-10-28 PROCEDURE — 80053 COMPREHEN METABOLIC PANEL: CPT

## 2020-10-28 PROCEDURE — 85025 COMPLETE CBC W/AUTO DIFF WBC: CPT

## 2020-10-28 PROCEDURE — 93005 ELECTROCARDIOGRAM TRACING: CPT

## 2020-10-28 PROCEDURE — 63600175 PHARM REV CODE 636 W HCPCS: Performed by: EMERGENCY MEDICINE

## 2020-10-28 PROCEDURE — 93010 EKG 12-LEAD: ICD-10-PCS | Mod: ,,, | Performed by: INTERNAL MEDICINE

## 2020-10-28 PROCEDURE — 99285 EMERGENCY DEPT VISIT HI MDM: CPT | Mod: 25

## 2020-10-28 PROCEDURE — 25000003 PHARM REV CODE 250: Performed by: EMERGENCY MEDICINE

## 2020-10-28 RX ORDER — ONDANSETRON 4 MG/1
4 TABLET, FILM COATED ORAL EVERY 6 HOURS
Qty: 12 TABLET | Refills: 0 | Status: SHIPPED | OUTPATIENT
Start: 2020-10-28 | End: 2020-10-28 | Stop reason: SDUPTHER

## 2020-10-28 RX ORDER — ONDANSETRON 4 MG/1
4 TABLET, FILM COATED ORAL EVERY 6 HOURS
Qty: 12 TABLET | Refills: 0 | Status: SHIPPED | OUTPATIENT
Start: 2020-10-28 | End: 2021-03-04

## 2020-10-28 RX ORDER — HYDROCODONE BITARTRATE AND ACETAMINOPHEN 10; 325 MG/1; MG/1
1 TABLET ORAL DAILY PRN
Status: ON HOLD | COMMUNITY
End: 2022-05-21 | Stop reason: HOSPADM

## 2020-10-28 RX ADMIN — PROMETHAZINE HYDROCHLORIDE 25 MG: 25 INJECTION INTRAMUSCULAR; INTRAVENOUS at 04:10

## 2020-10-28 NOTE — ED PROVIDER NOTES
SCRIBE #1 NOTE: I, Shaquille Saleh, am scribing for, and in the presence of, Cindy Jacinto MD. I have scribed the entire note.      History      Chief Complaint   Patient presents with    Emesis       Review of patient's allergies indicates:   Allergen Reactions    Amitriptyline     Hydrochlorothiazide      Causes muscle cramping    Lisinopril      hyperkalemia    Percocet [oxycodone-acetaminophen] Other (See Comments)     Seizures    Codeine Nausea Only and Rash        HPI   HPI    10/28/2020, 4:19 PM   History obtained from the patient      History of Present Illness: Leslie Wood is a 80 y.o. female patient who presents to the Emergency Department for emesis, onset just PTA after eating lunch. Symptoms are episodic and moderate in severity. No mitigating or exacerbating factors reported. Associated sxs include nausea and 2 syncopal episodes. Patient denies any fever, chills, SOB, CP, weakness, numbness, dizziness, headache, and all other sxs at this time. No prior Tx reported. No further complaints or concerns at this time.     Arrival mode: EMS    PCP: Angeles Carson MD       Past Medical History:  Past Medical History:   Diagnosis Date    Arthritis     Cataract     GERD (gastroesophageal reflux disease)     Hypertension     Stroke        Past Surgical History:  Past Surgical History:   Procedure Laterality Date    ADENOIDECTOMY      ADRENAL GLAND SURGERY      APPENDECTOMY      BACK SURGERY      fusion l 4-5 s 1,2,3  fusion l 2-3    EYE SURGERY      HEMORRHOID SURGERY      HERNIA REPAIR      HYSTERECTOMY      indirect lumbar decompression      percutaneous placement of interspinous extension blocker    TONSILLECTOMY           Family History:  Family History   Problem Relation Age of Onset    Heart disease Mother     Hypertension Mother     Diabetes Mother     Hypertension Father     Kidney disease Father        Social History:  Social History     Tobacco Use    Smoking status:  Never Smoker    Smokeless tobacco: Never Used   Substance and Sexual Activity    Alcohol use: No    Drug use: No    Sexual activity: Not Currently       ROS   Review of Systems   Constitutional: Negative for chills and fever.   HENT: Negative for sore throat.    Respiratory: Negative for shortness of breath.    Cardiovascular: Negative for chest pain.   Gastrointestinal: Positive for nausea and vomiting. Negative for diarrhea.   Genitourinary: Negative for dysuria.   Musculoskeletal: Negative for back pain.   Skin: Negative for rash.   Neurological: Positive for syncope (x2). Negative for dizziness, weakness, light-headedness, numbness and headaches.   Hematological: Does not bruise/bleed easily.   All other systems reviewed and are negative.    Physical Exam      Initial Vitals [10/28/20 1551]   BP Pulse Resp Temp SpO2   (!) 170/80 80 16 97.7 °F (36.5 °C) 98 %      MAP       --          Physical Exam  Nursing Notes and Vital Signs Reviewed.  Constitutional: Patient is in no acute distress. Well-developed and well-nourished.  Head: Atraumatic. Normocephalic.  Eyes: PERRL. EOM intact. Conjunctivae are not pale. No scleral icterus.  ENT: Mucous membranes are moist. Oropharynx is clear and symmetric.    Neck: Supple. Full ROM. No lymphadenopathy.  Cardiovascular: Regular rate. Regular rhythm. No murmurs, rubs, or gallops. Distal pulses are 2+ and symmetric.  Pulmonary/Chest: No respiratory distress. Clear to auscultation bilaterally. No wheezing or rales.  Abdominal: Soft and non-distended.  There is no tenderness.  No rebound, guarding, or rigidity.  Musculoskeletal: Moves all extremities. No obvious deformities. No edema.  Skin: Warm and dry.  Neurological:  Alert, awake, and appropriate.  Normal speech.  No acute focal neurological deficits are appreciated.  Psychiatric: Normal affect. Good eye contact. Appropriate in content.    ED Course    Procedures  ED Vital Signs:  Vitals:    10/28/20 1551 10/28/20 1705  10/28/20 1800 10/28/20 1806   BP: (!) 170/80 (!) 132/100 (!) 159/86 (!) 141/66   Pulse: 80 (!) 52 66 (!) 51   Resp: 16 16 18 16   Temp: 97.7 °F (36.5 °C)      TempSrc: Oral      SpO2: 98% (!) 92% (!) 91% (!) 94%    10/28/20 1935   BP: (!) 112/54   Pulse: (!) 49   Resp: 16   Temp:    TempSrc:    SpO2: (!) 93%       Abnormal Lab Results:  Labs Reviewed   CBC W/ AUTO DIFFERENTIAL - Abnormal; Notable for the following components:       Result Value    MCHC 31.4 (*)     RDW 14.6 (*)     All other components within normal limits   COMPREHENSIVE METABOLIC PANEL - Abnormal; Notable for the following components:    CO2 30 (*)     Glucose 143 (*)     BUN 27 (*)     eGFR if non  53 (*)     All other components within normal limits   URINALYSIS, REFLEX TO URINE CULTURE - Abnormal; Notable for the following components:    Specific Gravity, UA >=1.030 (*)     Protein, UA Trace (*)     All other components within normal limits    Narrative:     Specimen Source->Urine   LIPASE   MAGNESIUM        All Lab Results:  Results for orders placed or performed during the hospital encounter of 10/28/20   CBC W/ AUTO DIFFERENTIAL   Result Value Ref Range    WBC 7.29 3.90 - 12.70 K/uL    RBC 4.83 4.00 - 5.40 M/uL    Hemoglobin 13.7 12.0 - 16.0 g/dL    Hematocrit 43.6 37.0 - 48.5 %    MCV 90 82 - 98 fL    MCH 28.4 27.0 - 31.0 pg    MCHC 31.4 (L) 32.0 - 36.0 g/dL    RDW 14.6 (H) 11.5 - 14.5 %    Platelets 251 150 - 350 K/uL    MPV 9.7 9.2 - 12.9 fL    Immature Granulocytes 0.4 0.0 - 0.5 %    Gran # (ANC) 5.0 1.8 - 7.7 K/uL    Immature Grans (Abs) 0.03 0.00 - 0.04 K/uL    Lymph # 1.7 1.0 - 4.8 K/uL    Mono # 0.5 0.3 - 1.0 K/uL    Eos # 0.1 0.0 - 0.5 K/uL    Baso # 0.03 0.00 - 0.20 K/uL    nRBC 0 0 /100 WBC    Gran % 68.6 38.0 - 73.0 %    Lymph % 22.8 18.0 - 48.0 %    Mono % 7.0 4.0 - 15.0 %    Eosinophil % 0.8 0.0 - 8.0 %    Basophil % 0.4 0.0 - 1.9 %    Differential Method Automated    Comp. Metabolic Panel   Result Value Ref  Range    Sodium 144 136 - 145 mmol/L    Potassium 4.1 3.5 - 5.1 mmol/L    Chloride 103 95 - 110 mmol/L    CO2 30 (H) 23 - 29 mmol/L    Glucose 143 (H) 70 - 110 mg/dL    BUN 27 (H) 8 - 23 mg/dL    Creatinine 1.0 0.5 - 1.4 mg/dL    Calcium 10.5 8.7 - 10.5 mg/dL    Total Protein 8.1 6.0 - 8.4 g/dL    Albumin 4.4 3.5 - 5.2 g/dL    Total Bilirubin 0.6 0.1 - 1.0 mg/dL    Alkaline Phosphatase 71 55 - 135 U/L    AST 21 10 - 40 U/L    ALT 16 10 - 44 U/L    Anion Gap 11 8 - 16 mmol/L    eGFR if African American >60 >60 mL/min/1.73 m^2    eGFR if non African American 53 (A) >60 mL/min/1.73 m^2   Lipase   Result Value Ref Range    Lipase 30 4 - 60 U/L   Magnesium   Result Value Ref Range    Magnesium 1.9 1.6 - 2.6 mg/dL   Urinalysis, Reflex to Urine Culture Urine, Catheterized    Specimen: Urine   Result Value Ref Range    Specimen UA Urine, Clean Catch     Color, UA Yellow Yellow, Straw, Izabela    Appearance, UA Clear Clear    pH, UA 6.0 5.0 - 8.0    Specific Gravity, UA >=1.030 (A) 1.005 - 1.030    Protein, UA Trace (A) Negative    Glucose, UA Negative Negative    Ketones, UA Negative Negative    Bilirubin (UA) Negative Negative    Occult Blood UA Negative Negative    Nitrite, UA Negative Negative    Urobilinogen, UA Negative <2.0 EU/dL    Leukocytes, UA Negative Negative     Imaging Results:  Imaging Results          CT Head Without Contrast (Final result)  Result time 10/28/20 19:19:58    Final result by Daniel Baum MD (10/28/20 19:19:58)                 Impression:      Atrophy.  Small vessel disease.  Old right occipital lobe territory infarct.  No evidence of a mass or bleed..    All CT scans at this facility use dose modulation, iterative reconstruction and/or weight based dosing when appropriate to reduce radiation dose to as low as reasonably achievable.      Electronically signed by: Daniel Baum MD  Date:    10/28/2020  Time:    19:19             Narrative:    EXAMINATION:  CT HEAD WITHOUT CONTRAST    CLINICAL  HISTORY:  weakness;    TECHNIQUE:  Axial CT images of the head were obtained without  contrast.    FINDINGS:  Extensive low-density changes in the periventricular distribution bilaterally consistent with small vessel disease.  Old right occipital lobe territory infarct not present on the prior study dated 03/28/2018..  Atrophy..  Intracranial atherosclerotic calcifications.  No evidence of a mass or bleed..  The cranium and extracranial structures are unremarkable.  Visualized sinuses and mastoid air cells are clear.                               X-Ray Abdomen Flat And Erect (Final result)  Result time 10/28/20 18:11:01    Final result by Patti Workman MD (10/28/20 18:11:01)                 Impression:      Nonspecific bowel gas pattern with no definite evidence for air-fluid levels or distended loops of bowel.  Extensive postoperative/post instrumentation changes.  Findings as above      Electronically signed by: Ji Armstrong  Date:    10/28/2020  Time:    18:11             Narrative:    EXAMINATION:  XR ABDOMEN FLAT AND ERECT    CLINICAL HISTORY:  Abdominal Pain;    TECHNIQUE:  Flat and erect AP views of the abdomen were performed.    COMPARISON:  None    FINDINGS:  Spinal hardware with posterior rods and inter vertebral body spacer at L3-L4.  Right lower alyson body with leads in the thoracic region.  Status post mesh repair.  Nonspecific bowel gas pattern with no definite evidence for air-fluid levels or distended loops of bowel.  Degenerative joint disease of the spine.  Moderate amount of stool.  Suggestion of cardiomegaly and small left-sided pleural effusion similar to prior exam                               The EKG was ordered, reviewed, and independently interpreted by the ED provider.  Interpretation time: 16:09  Rate: 64 BPM  Rhythm: normal sinus rhythm  Interpretation: Septal infarct, age undetermined. No STEMI.           The Emergency Provider reviewed the vital signs and test results, which are  outlined above.    ED Discussion     7:25 PM: Reassessed pt at this time, tolerating po, abd exam benign, workup unremarkable. Discussed with pt all pertinent ED information and results. Discussed pt dx and plan of tx. Gave pt all f/u and return to the ED instructions. All questions and concerns were addressed at this time. Pt expresses understanding of information and instructions, and is comfortable with plan to discharge. Pt is stable for discharge.    I discussed with patient and/or family/caretaker that evaluation in the ED does not suggest any emergent or life threatening medical conditions requiring immediate intervention beyond what was provided in the ED, and I believe patient is safe for discharge.  Regardless, an unremarkable evaluation in the ED does not preclude the development or presence of a serious of life threatening condition. As such, patient was instructed to return immediately for any worsening or change in current symptoms.         ED Medication(s):  Medications   promethazine (PHENERGAN) 25 mg in dextrose 5 % 50 mL IVPB (0 mg Intravenous Stopped 10/28/20 4189)     Current Discharge Medication List      START taking these medications    Details   ondansetron (ZOFRAN) 4 MG tablet Take 1 tablet (4 mg total) by mouth every 6 (six) hours.  Qty: 12 tablet, Refills: 0           Follow-up Information     Angeles Carson MD. Schedule an appointment as soon as possible for a visit in 2 days.    Specialty: Internal Medicine  Why: Return to the Emergency room, If symptoms worsen  Contact information:  [address] [place] [address] DR Iggy HARRIS [address]  [phone]                     Medical Decision Making    Medical Decision Making:   Clinical Tests:   Lab Tests: Ordered and Reviewed  Radiological Study: Ordered and Reviewed  Medical Tests: Ordered and Reviewed           Scribe Attestation:   Scribe #1: I performed the above scribed service and the documentation accurately describes the services I  performed. I attest to the accuracy of the note.    Attending:   Physician Attestation Statement for Scribe #1: I, Cindy Jacinto MD, personally performed the services described in this documentation, as scribed by Shaquille Saleh, in my presence, and it is both accurate and complete.          Clinical Impression       ICD-10-CM ICD-9-CM   1. Nausea & vomiting  R11.2 787.01       Disposition:   Disposition: Discharged  Condition: Stable         Cindy Jacinto MD  10/28/20 1942

## 2020-10-31 ENCOUNTER — EXTERNAL CHRONIC CARE MANAGEMENT (OUTPATIENT)
Dept: PRIMARY CARE CLINIC | Facility: CLINIC | Age: 80
End: 2020-10-31
Payer: MEDICARE

## 2020-10-31 PROCEDURE — 99490 CHRNC CARE MGMT STAFF 1ST 20: CPT | Mod: S$PBB,,, | Performed by: FAMILY MEDICINE

## 2020-10-31 PROCEDURE — 99490 PR CHRONIC CARE MGMT, 1ST 20 MIN: ICD-10-PCS | Mod: S$PBB,,, | Performed by: FAMILY MEDICINE

## 2020-10-31 PROCEDURE — 99490 CHRNC CARE MGMT STAFF 1ST 20: CPT | Mod: PBBFAC | Performed by: FAMILY MEDICINE

## 2020-11-06 ENCOUNTER — PATIENT MESSAGE (OUTPATIENT)
Dept: NEUROLOGY | Facility: CLINIC | Age: 80
End: 2020-11-06

## 2020-11-10 ENCOUNTER — TELEPHONE (OUTPATIENT)
Dept: INTERNAL MEDICINE | Facility: CLINIC | Age: 80
End: 2020-11-10

## 2020-11-10 NOTE — TELEPHONE ENCOUNTER
----- Message from Ermastefany Sanabria sent at 11/10/2020  9:10 AM CST -----  Contact: pt  Pt requesting a call back regarding her prescription for amLODIPine (NORVASC) 5 MG tablet. She states that her legs are swelling more, cramping. She states that she has had this problem before while taking it. Please call pt back at [phone]

## 2020-11-10 NOTE — TELEPHONE ENCOUNTER
Pt: Leslie Wood   Phone: [phone]   Pharmacy: [facility], [place] - [address]     Good morning,   Pt sees  Dr. Angeles Carson as PCP.   CC spoke with pt this morning for health assessment.  Pt states had put a call in this morning regarding amlodipine concerns.  I see where nurse tried returning pts call but no answer.  Pt asks can nurse please reach out to her at [phone].   Thanks,   Jayne Dangelo LPN   Care Coordinator

## 2020-11-11 ENCOUNTER — TELEPHONE (OUTPATIENT)
Dept: INTERNAL MEDICINE | Facility: CLINIC | Age: 80
End: 2020-11-11

## 2020-11-11 NOTE — TELEPHONE ENCOUNTER
----- Message from Nohemi Weldon sent at 11/11/2020  2:33 PM CST -----  Regarding: Call back  Contact: Patient  Patient would like a call back concerning changing her blood pressure medication, her legs and feet are swelling and she can barely stand. Please call to advise at Ph .[phone]

## 2020-11-12 ENCOUNTER — OFFICE VISIT (OUTPATIENT)
Dept: INTERNAL MEDICINE | Facility: CLINIC | Age: 80
End: 2020-11-12
Payer: MEDICARE

## 2020-11-12 ENCOUNTER — HOSPITAL ENCOUNTER (OUTPATIENT)
Dept: CARDIOLOGY | Facility: HOSPITAL | Age: 80
Discharge: HOME | End: 2020-11-12
Payer: MEDICARE

## 2020-11-12 VITALS
WEIGHT: 140.88 LBS | RESPIRATION RATE: 18 BRPM | BODY MASS INDEX: 26.6 KG/M2 | HEIGHT: 61 IN | DIASTOLIC BLOOD PRESSURE: 78 MMHG | SYSTOLIC BLOOD PRESSURE: 166 MMHG | OXYGEN SATURATION: 96 % | TEMPERATURE: 98 F | HEART RATE: 54 BPM

## 2020-11-12 DIAGNOSIS — R07.89 CHEST PRESSURE: ICD-10-CM

## 2020-11-12 DIAGNOSIS — E11.9 TYPE 2 DIABETES MELLITUS WITHOUT COMPLICATION, WITHOUT LONG-TERM CURRENT USE OF INSULIN: ICD-10-CM

## 2020-11-12 DIAGNOSIS — I10 ESSENTIAL HYPERTENSION: Primary | ICD-10-CM

## 2020-11-12 PROCEDURE — 99999 PR PBB SHADOW E&M-EST. PATIENT-LVL IV: CPT | Mod: PBBFAC,,, | Performed by: PHYSICIAN ASSISTANT

## 2020-11-12 PROCEDURE — 99214 OFFICE O/P EST MOD 30 MIN: CPT | Mod: PBBFAC | Performed by: PHYSICIAN ASSISTANT

## 2020-11-12 PROCEDURE — 99214 OFFICE O/P EST MOD 30 MIN: CPT | Mod: S$PBB,,, | Performed by: PHYSICIAN ASSISTANT

## 2020-11-12 PROCEDURE — 99999 PR PBB SHADOW E&M-EST. PATIENT-LVL IV: ICD-10-PCS | Mod: PBBFAC,,, | Performed by: PHYSICIAN ASSISTANT

## 2020-11-12 PROCEDURE — 99214 PR OFFICE/OUTPT VISIT, EST, LEVL IV, 30-39 MIN: ICD-10-PCS | Mod: S$PBB,,, | Performed by: PHYSICIAN ASSISTANT

## 2020-11-12 NOTE — PROGRESS NOTES
"Subjective:      Patient ID: Leslie Wood is a 80 y.o. female.    Chief Complaint: Hospital Follow Up     Patient is new to me, being seen today for ER follow up and leg swelling and lower leg pain.    H/o of swelling with amlodipine, stopped for quite some time and swelling improved.  Was restarted in recent months (on amlodipine at last visit with Dr. Carson Sept).  Swelling is on and off and started again when she restarted amlodipine.  BP elevated, hasn't taken medication in 3days     Seen in ED on 10/28 for nausea and vomiting and syncope.  UA, labs, x-ray, CT head and EKG performed.  Unremarkable work up.      Last visit with Dr. Carson in September      Takes valsartan 80mg bid, and amlodipine 5mg, coreg 12.5mg bid  Took lisinopril for years, no longer  Muscle cramps with diuretic    Review of Systems   Constitutional: Negative for chills, diaphoresis and fever.   HENT: Negative for congestion, rhinorrhea and sore throat.    Respiratory: Negative for cough, shortness of breath and wheezing.    Cardiovascular: Positive for leg swelling (lower leg, improved since stopping amlodipine, worse in evening). Negative for chest pain and palpitations.        +chest pressure   Gastrointestinal: Negative for abdominal pain, constipation, diarrhea, nausea and vomiting.   Genitourinary: Negative for dysuria, frequency and hematuria.   Skin: Negative for rash.   Neurological: Negative for dizziness, light-headedness and headaches.       Objective:   BP (!) 166/78 (BP Location: Left arm, Patient Position: Sitting, BP Method: Medium (Manual))   Pulse (!) 54   Temp 97.6 °F (36.4 °C) (Tympanic)   Resp 18   Ht 5' 1" (1.549 m)   Wt 63.9 kg (140 lb 14 oz)   SpO2 96%   BMI 26.62 kg/m²   Physical Exam  Constitutional:       General: She is not in acute distress.     Appearance: Normal appearance. She is well-developed. She is not ill-appearing.   HENT:      Head: Normocephalic and atraumatic.   Cardiovascular:      Rate and " Rhythm: Normal rate and regular rhythm.      Heart sounds: Normal heart sounds. No murmur.   Pulmonary:      Effort: Pulmonary effort is normal. No respiratory distress.      Breath sounds: Normal breath sounds. No decreased breath sounds.   Musculoskeletal:      Right lower leg: She exhibits no swelling. Edema (1_) present.      Left lower leg: She exhibits no swelling. Edema (1+) present.   Skin:     General: Skin is warm and dry.      Findings: No rash.   Psychiatric:         Speech: Speech normal.         Behavior: Behavior normal.         Thought Content: Thought content normal.       Assessment:      1. Essential hypertension    2. Type 2 diabetes mellitus without complication, without long-term current use of insulin    3. Chest pressure       Plan:   Essential hypertension    Type 2 diabetes mellitus without complication, without long-term current use of insulin    Chest pressure  -     EKG 12-lead; Future      Recommend 160 valsartan in AM and 80mg in PM  Monitor BP and keep log, bring both to f/u visit with Dr. Carson next week    Ensure adequate hydration, limit salt, elevate legs     Discussed worsening signs/symptoms and when to return to clinic or go to ED.   Patient expresses understanding and agrees with treatment plan.

## 2020-11-17 ENCOUNTER — OFFICE VISIT (OUTPATIENT)
Dept: CARDIOLOGY | Facility: CLINIC | Age: 80
End: 2020-11-17
Payer: MEDICARE

## 2020-11-17 VITALS
WEIGHT: 140.88 LBS | OXYGEN SATURATION: 95 % | HEART RATE: 55 BPM | DIASTOLIC BLOOD PRESSURE: 78 MMHG | SYSTOLIC BLOOD PRESSURE: 132 MMHG | BODY MASS INDEX: 26.62 KG/M2

## 2020-11-17 DIAGNOSIS — I10 ESSENTIAL HYPERTENSION: ICD-10-CM

## 2020-11-17 DIAGNOSIS — R42 DIZZINESS: ICD-10-CM

## 2020-11-17 DIAGNOSIS — R07.9 CHEST PAIN, UNSPECIFIED TYPE: Primary | ICD-10-CM

## 2020-11-17 DIAGNOSIS — R00.1 SINUS BRADYCARDIA: ICD-10-CM

## 2020-11-17 DIAGNOSIS — R53.83 FATIGUE, UNSPECIFIED TYPE: ICD-10-CM

## 2020-11-17 PROCEDURE — 99999 PR PBB SHADOW E&M-EST. PATIENT-LVL III: ICD-10-PCS | Mod: PBBFAC,,, | Performed by: INTERNAL MEDICINE

## 2020-11-17 PROCEDURE — 99213 OFFICE O/P EST LOW 20 MIN: CPT | Mod: PBBFAC | Performed by: INTERNAL MEDICINE

## 2020-11-17 PROCEDURE — 99215 OFFICE O/P EST HI 40 MIN: CPT | Mod: S$PBB,,, | Performed by: INTERNAL MEDICINE

## 2020-11-17 PROCEDURE — 99999 PR PBB SHADOW E&M-EST. PATIENT-LVL III: CPT | Mod: PBBFAC,,, | Performed by: INTERNAL MEDICINE

## 2020-11-17 PROCEDURE — 99215 PR OFFICE/OUTPT VISIT, EST, LEVL V, 40-54 MIN: ICD-10-PCS | Mod: S$PBB,,, | Performed by: INTERNAL MEDICINE

## 2020-11-17 RX ORDER — CARVEDILOL 6.25 MG/1
6.25 TABLET ORAL 2 TIMES DAILY
Qty: 180 TABLET | Refills: 3 | Status: SHIPPED | OUTPATIENT
Start: 2020-11-17 | End: 2021-11-23

## 2020-11-17 NOTE — PROGRESS NOTES
Subjective:   Patient ID:  Leslie Wood is a 80 y.o. female who presents for evaluation of No chief complaint on file.      Initial HPI: 10/1/2020 Patient 80-year-old, prediabetic, with history of hypertension, who recently had a stroke about 3 months ago and was hospitalized at [facility].  She does not recall what kind of cardiac workup she underwent while at that hospital.  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  Her left-sided weakness has improved since the stroke.  She is undergoing rehab.  And should undergo speech therapy soon as she states.  She reports compliance with her blood pressure medications, however she reports feeling dizzy when she stands up, she does not get dizzy or any other circumstances.  She states that she is not drinking enough water.  Also she complains of bilateral lower extremity mild swelling that comes on and off when she takes or not take her amlodipine.  She denies any chest pain, dyspnea, orthopnea, PND, palpitations, syncope, presyncope, bleeding.      Interval hx:   Since last visit, been having chest pain for a month , some weeks daily, 6/10, pressure like, not radiating, not worse or better with exercise and resting, resolve on their own after 10 mins, she also reports labored breathing when these episodes happen.       Past Medical History:   Diagnosis Date    Arthritis     Cataract     GERD (gastroesophageal reflux disease)     Hypertension     Stroke        Past Surgical History:   Procedure Laterality Date    ADENOIDECTOMY      ADRENAL GLAND SURGERY      APPENDECTOMY      BACK SURGERY      fusion l 4-5 s 1,2,3  fusion l 2-3    EYE SURGERY      HEMORRHOID SURGERY      HERNIA REPAIR      HYSTERECTOMY      indirect lumbar decompression      percutaneous placement of interspinous extension blocker    TONSILLECTOMY         Social History     Tobacco Use    Smoking status: Never Smoker    Smokeless tobacco: Never Used   Substance Use Topics     Alcohol use: No    Drug use: No       Family History   Problem Relation Age of Onset    Heart disease Mother     Hypertension Mother     Diabetes Mother     Hypertension Father     Kidney disease Father        Review of Systems   Constitution: Negative for fever and malaise/fatigue.   HENT: Negative for sore throat.    Eyes: Negative for blurred vision.   Cardiovascular: Positive for leg swelling. Negative for chest pain, claudication, cyanosis, dyspnea on exertion, irregular heartbeat, near-syncope, orthopnea, palpitations, paroxysmal nocturnal dyspnea and syncope.   Respiratory: Negative for cough, hemoptysis and shortness of breath.    Hematologic/Lymphatic: Negative for bleeding problem.   Skin: Negative for poor wound healing and rash.   Musculoskeletal: Negative for back pain and falls.   Gastrointestinal: Negative for abdominal pain.   Genitourinary: Negative for nocturia.   Neurological: Positive for dizziness and focal weakness. Negative for headaches, light-headedness and loss of balance.   Psychiatric/Behavioral: Negative for altered mental status and substance abuse.       Current Outpatient Medications on File Prior to Visit   Medication Sig    aspirin (ECOTRIN) 81 MG EC tablet Take 81 mg by mouth once daily.    HYDROcodone-acetaminophen (NORCO)  mg per tablet Take 1 tablet by mouth.    ondansetron (ZOFRAN) 4 MG tablet Take 1 tablet (4 mg total) by mouth every 6 (six) hours.    traZODone (DESYREL) 50 MG tablet TAKE 1 TABLET AT BEDTIME AS NEEDED FOR INSOMNIA    valsartan (DIOVAN) 160 MG tablet Take 0.5 tablets (80 mg total) by mouth 2 (two) times daily.    [DISCONTINUED] carvediloL (COREG) 12.5 MG tablet TAKE (1) TABLET BY MOUTH 2 TIMES A DAY WITH MEALS    amLODIPine (NORVASC) 5 MG tablet Take 1 tablet (5 mg total) by mouth once daily. (Patient not taking: Reported on 11/17/2020)    tizanidine (ZANAFLEX) 4 MG tablet Take 1 tablet (4 mg total) by mouth every 6 (six) hours as needed  (HOLD while on CIPRO). (Patient not taking: Reported on 9/16/2020)     No current facility-administered medications on file prior to visit.        Objective:   Objective:  Wt Readings from Last 3 Encounters:   11/17/20 63.9 kg (140 lb 14 oz)   11/12/20 63.9 kg (140 lb 14 oz)   10/14/20 65.5 kg (144 lb 6.4 oz)     Temp Readings from Last 3 Encounters:   11/12/20 97.6 °F (36.4 °C) (Tympanic)   10/28/20 98.3 °F (36.8 °C) (Oral)   10/14/20 98.4 °F (36.9 °C)     BP Readings from Last 3 Encounters:   11/17/20 132/78   11/12/20 (!) 166/78   10/28/20 (!) 112/54     Pulse Readings from Last 3 Encounters:   11/17/20 (!) 55   11/12/20 (!) 54   10/28/20 (!) 49       Physical Exam   Constitutional: She is oriented to person, place, and time. She appears well-developed and well-nourished.   HENT:   Head: Normocephalic and atraumatic.   Eyes: Conjunctivae are normal. No scleral icterus.   Neck: Normal range of motion. Neck supple.   Cardiovascular: Normal rate, regular rhythm, normal heart sounds and intact distal pulses.   No murmur heard.  Pulmonary/Chest: No respiratory distress. She has no wheezes. She has no rales. She exhibits no tenderness.   Abdominal: Soft. Bowel sounds are normal. She exhibits no distension. There is no guarding.   Musculoskeletal: Normal range of motion.   Neurological: She is alert and oriented to person, place, and time.   Skin: Skin is warm.   Psychiatric: She has a normal mood and affect.   Vitals reviewed.      Lab Results   Component Value Date    CHOL 101 (L) 09/16/2020     Lab Results   Component Value Date    HDL 46 09/16/2020     Lab Results   Component Value Date    LDLCALC 37.0 (L) 09/16/2020     Lab Results   Component Value Date    TRIG 90 09/16/2020     Lab Results   Component Value Date    CHOLHDL 45.5 09/16/2020       Chemistry        Component Value Date/Time     10/28/2020 1644    K 4.1 10/28/2020 1644     10/28/2020 1644    CO2 30 (H) 10/28/2020 1644    BUN 27 (H)  10/28/2020 1644    CREATININE 1.0 10/28/2020 1644     (H) 10/28/2020 1644        Component Value Date/Time    CALCIUM 10.5 10/28/2020 1644    ALKPHOS 71 10/28/2020 1644    AST 21 10/28/2020 1644    ALT 16 10/28/2020 1644    BILITOT 0.6 10/28/2020 1644    ESTGFRAFRICA >60 10/28/2020 1644    EGFRNONAA 53 (A) 10/28/2020 1644          Lab Results   Component Value Date    TSH 1.549 12/02/2019     Lab Results   Component Value Date    INR 1.0 08/28/2013     Lab Results   Component Value Date    WBC 7.29 10/28/2020    HGB 13.7 10/28/2020    HCT 43.6 10/28/2020    MCV 90 10/28/2020     10/28/2020     BNP  @LABRCNTIP(BNP,BNPTRIAGEBLO)@  CrCl cannot be calculated (Patient's most recent lab result is older than the maximum 7 days allowed.).     Imaging:  ======  Results for orders placed during the hospital encounter of 03/02/20   Echo Color Flow Doppler? Yes    Narrative · Mild concentric left ventricular hypertrophy.  · Normal left ventricular systolic function. The estimated ejection   fraction is 60%.  · Normal LV diastolic function.  · Normal right ventricular systolic function.  · Mild aortic regurgitation.  · Normal central venous pressure (3 mmHg).  · Trivial pericardial effusion.        No results found for this or any previous visit.  No results found for this or any previous visit.  Results for orders placed during the hospital encounter of 05/11/17   X-Ray Chest PA And Lateral    Narrative XR CHEST PA AND LATERAL, 05/11/17 11:38:46    Clinical indication: Chest pain.    Findings:  Comparison with 05/10/2017.    Epidural leads within the dorsal thoracic spine.  Lower thoracic wedge compression fracture status post kyphoplasty.  Left upper quadrant surgical clips.    Heart size is normal.  Prominent left pericardial fat pad.    Aortic arch calcification.    Lung fields remain clear.    Impression      Stable chest x-ray.  No acute findings.      Electronically signed by: EMERY SAMAYOA MD  Date:      05/11/17  Time:    12:07      No results found for this or any previous visit.  No procedure found.    Diagnostic Results:  ECG: Reviewed  Marked sinus bradycardia with sinus arrhythmia   Abnormal ECG   When compared with ECG of 28-OCT-2020 16:09,   Criteria for Septal infarct are no longer Present   T wave inversion no longer evident in Anterior leads   Confirmed by MD RODERICK, BARB ([id number]) on 11/12/2020 9:25:42 PM     The ASCVD Risk score (Carmen YADI Jr., et al., 2013) failed to calculate for the following reasons:    The 2013 ASCVD risk score is only valid for ages 40 to 79    The patient has a prior MI or stroke diagnosis    Assessment and Plan:   Chest pain, unspecified type  Comments:  partially reproducible on palpation  Orders:  -     Nuclear Stress - Cardiology Interpreted; Future    Essential hypertension  -     carvediloL (COREG) 6.25 MG tablet; Take 1 tablet (6.25 mg total) by mouth 2 (two) times daily. TAKE (1) TABLET BY MOUTH 2 TIMES A DAY WITH MEALS  Dispense: 180 tablet; Refill: 3    Sinus bradycardia  Comments:  decrease coreg to 6.25 given resting bradycardia    Fatigue, unspecified type    Dizziness  Comments:  changing coreg dose        Follow up in 6 months unless stress test is abnormal

## 2020-11-18 ENCOUNTER — HOSPITAL ENCOUNTER (EMERGENCY)
Facility: HOSPITAL | Age: 80
Discharge: HOME | End: 2020-11-19
Attending: FAMILY MEDICINE
Payer: MEDICARE

## 2020-11-18 ENCOUNTER — OFFICE VISIT (OUTPATIENT)
Dept: INTERNAL MEDICINE | Facility: CLINIC | Age: 80
End: 2020-11-18
Payer: MEDICARE

## 2020-11-18 VITALS
HEIGHT: 61 IN | DIASTOLIC BLOOD PRESSURE: 62 MMHG | RESPIRATION RATE: 18 BRPM | HEART RATE: 50 BPM | SYSTOLIC BLOOD PRESSURE: 118 MMHG | TEMPERATURE: 96 F | BODY MASS INDEX: 26.73 KG/M2 | WEIGHT: 141.56 LBS | OXYGEN SATURATION: 96 %

## 2020-11-18 DIAGNOSIS — R00.1 BRADYCARDIA: Primary | ICD-10-CM

## 2020-11-18 DIAGNOSIS — I10 ESSENTIAL HYPERTENSION: ICD-10-CM

## 2020-11-18 DIAGNOSIS — M79.89 SWELLING OF BOTH LOWER EXTREMITIES: ICD-10-CM

## 2020-11-18 DIAGNOSIS — R42 DIZZINESS: Primary | ICD-10-CM

## 2020-11-18 LAB
ALBUMIN SERPL BCP-MCNC: 3.6 G/DL (ref 3.5–5.2)
ALP SERPL-CCNC: 83 U/L (ref 55–135)
ALT SERPL W/O P-5'-P-CCNC: 17 U/L (ref 10–44)
ANION GAP SERPL CALC-SCNC: 6 MMOL/L (ref 8–16)
AST SERPL-CCNC: 19 U/L (ref 10–40)
BASOPHILS # BLD AUTO: 0.05 K/UL (ref 0–0.2)
BASOPHILS NFR BLD: 0.5 % (ref 0–1.9)
BILIRUB SERPL-MCNC: 0.4 MG/DL (ref 0.1–1)
BILIRUB UR QL STRIP: NEGATIVE
BNP SERPL-MCNC: 54 PG/ML (ref 0–99)
BUN SERPL-MCNC: 14 MG/DL (ref 8–23)
CALCIUM SERPL-MCNC: 9.1 MG/DL (ref 8.7–10.5)
CHLORIDE SERPL-SCNC: 104 MMOL/L (ref 95–110)
CLARITY UR: CLEAR
CO2 SERPL-SCNC: 31 MMOL/L (ref 23–29)
COLOR UR: YELLOW
CREAT SERPL-MCNC: 0.8 MG/DL (ref 0.5–1.4)
DIFFERENTIAL METHOD: ABNORMAL
EOSINOPHIL # BLD AUTO: 0.2 K/UL (ref 0–0.5)
EOSINOPHIL NFR BLD: 1.6 % (ref 0–8)
ERYTHROCYTE [DISTWIDTH] IN BLOOD BY AUTOMATED COUNT: 14.2 % (ref 11.5–14.5)
EST. GFR  (AFRICAN AMERICAN): >60 ML/MIN/1.73 M^2
EST. GFR  (NON AFRICAN AMERICAN): >60 ML/MIN/1.73 M^2
GLUCOSE SERPL-MCNC: 100 MG/DL (ref 70–110)
GLUCOSE UR QL STRIP: NEGATIVE
HCT VFR BLD AUTO: 42 % (ref 37–48.5)
HGB BLD-MCNC: 12.9 G/DL (ref 12–16)
HGB UR QL STRIP: NEGATIVE
IMM GRANULOCYTES # BLD AUTO: 0.03 K/UL (ref 0–0.04)
IMM GRANULOCYTES NFR BLD AUTO: 0.3 % (ref 0–0.5)
KETONES UR QL STRIP: NEGATIVE
LEUKOCYTE ESTERASE UR QL STRIP: NEGATIVE
LYMPHOCYTES # BLD AUTO: 2.5 K/UL (ref 1–4.8)
LYMPHOCYTES NFR BLD: 25.3 % (ref 18–48)
MCH RBC QN AUTO: 27.9 PG (ref 27–31)
MCHC RBC AUTO-ENTMCNC: 30.7 G/DL (ref 32–36)
MCV RBC AUTO: 91 FL (ref 82–98)
MONOCYTES # BLD AUTO: 1.4 K/UL (ref 0.3–1)
MONOCYTES NFR BLD: 14.1 % (ref 4–15)
NEUTROPHILS # BLD AUTO: 5.7 K/UL (ref 1.8–7.7)
NEUTROPHILS NFR BLD: 58.2 % (ref 38–73)
NITRITE UR QL STRIP: NEGATIVE
NRBC BLD-RTO: 0 /100 WBC
PH UR STRIP: 6 [PH] (ref 5–8)
PLATELET # BLD AUTO: 219 K/UL (ref 150–350)
PMV BLD AUTO: 9.8 FL (ref 9.2–12.9)
POTASSIUM SERPL-SCNC: 4 MMOL/L (ref 3.5–5.1)
PROT SERPL-MCNC: 6.8 G/DL (ref 6–8.4)
PROT UR QL STRIP: NEGATIVE
RBC # BLD AUTO: 4.62 M/UL (ref 4–5.4)
SODIUM SERPL-SCNC: 141 MMOL/L (ref 136–145)
SP GR UR STRIP: 1.02 (ref 1–1.03)
TROPONIN I SERPL DL<=0.01 NG/ML-MCNC: <0.006 NG/ML (ref 0–0.03)
URN SPEC COLLECT METH UR: NORMAL
UROBILINOGEN UR STRIP-ACNC: NEGATIVE EU/DL
WBC # BLD AUTO: 9.74 K/UL (ref 3.9–12.7)

## 2020-11-18 PROCEDURE — 96360 HYDRATION IV INFUSION INIT: CPT

## 2020-11-18 PROCEDURE — 99213 PR OFFICE/OUTPT VISIT, EST, LEVL III, 20-29 MIN: ICD-10-PCS | Mod: S$PBB,,, | Performed by: FAMILY MEDICINE

## 2020-11-18 PROCEDURE — 84484 ASSAY OF TROPONIN QUANT: CPT

## 2020-11-18 PROCEDURE — 93010 ELECTROCARDIOGRAM REPORT: CPT | Mod: ,,, | Performed by: INTERNAL MEDICINE

## 2020-11-18 PROCEDURE — 93005 ELECTROCARDIOGRAM TRACING: CPT

## 2020-11-18 PROCEDURE — 99999 PR PBB SHADOW E&M-EST. PATIENT-LVL IV: CPT | Mod: PBBFAC,,, | Performed by: FAMILY MEDICINE

## 2020-11-18 PROCEDURE — 85025 COMPLETE CBC W/AUTO DIFF WBC: CPT

## 2020-11-18 PROCEDURE — 81003 URINALYSIS AUTO W/O SCOPE: CPT

## 2020-11-18 PROCEDURE — 99214 OFFICE O/P EST MOD 30 MIN: CPT | Mod: PBBFAC,25 | Performed by: FAMILY MEDICINE

## 2020-11-18 PROCEDURE — 99285 EMERGENCY DEPT VISIT HI MDM: CPT | Mod: 25,27

## 2020-11-18 PROCEDURE — 93010 EKG 12-LEAD: ICD-10-PCS | Mod: ,,, | Performed by: INTERNAL MEDICINE

## 2020-11-18 PROCEDURE — 80053 COMPREHEN METABOLIC PANEL: CPT

## 2020-11-18 PROCEDURE — 99999 PR PBB SHADOW E&M-EST. PATIENT-LVL IV: ICD-10-PCS | Mod: PBBFAC,,, | Performed by: FAMILY MEDICINE

## 2020-11-18 PROCEDURE — 83880 ASSAY OF NATRIURETIC PEPTIDE: CPT

## 2020-11-18 PROCEDURE — 99213 OFFICE O/P EST LOW 20 MIN: CPT | Mod: S$PBB,,, | Performed by: FAMILY MEDICINE

## 2020-11-18 NOTE — PROGRESS NOTES
Received a call from Jeanne Martins nurse in response to pt heart rate being low  New orders for pt to take Coreg 1/2 of the 6.25 twice a day and keep follow up appt.   Notified granddaughter of new orders and she verbalized understanding

## 2020-11-18 NOTE — PROGRESS NOTES
Subjective:       Patient ID: Leslie Wood is a 80 y.o. female.    Chief Complaint: Follow-up    HPI     Ms. Wood presents today for follow up.   Granddaugther present today states she is not sure she needed to keep today's appt since seeing cardiology on yesterday.     Saw provider last week for swelling of the lower extremities and chest pressure.  She stopped amlodipine 5 mg.     Swelling has gotten better  A week and a couple day since she has stopped amlodipine.     Cardiology decreased her Coreg to 6.25 mg   Her HR was 60 on yesterday and has been in the 50's since then  Pt does not feel well today and says she feels tired.     Review of Systems   Constitutional: Negative.    HENT: Negative.    Cardiovascular: Positive for leg swelling.   Gastrointestinal: Negative.    Musculoskeletal: Negative.    Psychiatric/Behavioral: Negative.            Past Medical History:   Diagnosis Date    Arthritis     Cataract     GERD (gastroesophageal reflux disease)     Hypertension     Stroke      Past Surgical History:   Procedure Laterality Date    ADENOIDECTOMY      ADRENAL GLAND SURGERY      APPENDECTOMY      BACK SURGERY      fusion l 4-5 s 1,2,3  fusion l 2-3    EYE SURGERY      HEMORRHOID SURGERY      HERNIA REPAIR      HYSTERECTOMY      indirect lumbar decompression      percutaneous placement of interspinous extension blocker    TONSILLECTOMY         Objective:        Physical Exam  Vitals signs reviewed.   Constitutional:       Appearance: Normal appearance.   HENT:      Head: Normocephalic and atraumatic.   Musculoskeletal:      Right lower leg: Edema (trace) present.      Left lower leg: Edema (trace) present.   Neurological:      Mental Status: She is alert and oriented to person, place, and time.   Psychiatric:         Mood and Affect: Mood normal.         Behavior: Behavior normal.         Protective Sensation (w/ 10 gram monofilament):  Right: Decreased  Left: Decreased    Visual  Inspection:  Normal -  Bilateral and Nails Intact - without Evidence of Foot Deformity- Bilateral    Pedal Pulses:   Right: Present  Left: Present    Posterior tibialis:   Right:Present  Left: Present      Results for orders placed or performed during the hospital encounter of 10/28/20   CBC W/ AUTO DIFFERENTIAL   Result Value Ref Range    WBC 7.29 3.90 - 12.70 K/uL    RBC 4.83 4.00 - 5.40 M/uL    Hemoglobin 13.7 12.0 - 16.0 g/dL    Hematocrit 43.6 37.0 - 48.5 %    MCV 90 82 - 98 fL    MCH 28.4 27.0 - 31.0 pg    MCHC 31.4 (L) 32.0 - 36.0 g/dL    RDW 14.6 (H) 11.5 - 14.5 %    Platelets 251 150 - 350 K/uL    MPV 9.7 9.2 - 12.9 fL    Immature Granulocytes 0.4 0.0 - 0.5 %    Gran # (ANC) 5.0 1.8 - 7.7 K/uL    Immature Grans (Abs) 0.03 0.00 - 0.04 K/uL    Lymph # 1.7 1.0 - 4.8 K/uL    Mono # 0.5 0.3 - 1.0 K/uL    Eos # 0.1 0.0 - 0.5 K/uL    Baso # 0.03 0.00 - 0.20 K/uL    nRBC 0 0 /100 WBC    Gran % 68.6 38.0 - 73.0 %    Lymph % 22.8 18.0 - 48.0 %    Mono % 7.0 4.0 - 15.0 %    Eosinophil % 0.8 0.0 - 8.0 %    Basophil % 0.4 0.0 - 1.9 %    Differential Method Automated    Comp. Metabolic Panel   Result Value Ref Range    Sodium 144 136 - 145 mmol/L    Potassium 4.1 3.5 - 5.1 mmol/L    Chloride 103 95 - 110 mmol/L    CO2 30 (H) 23 - 29 mmol/L    Glucose 143 (H) 70 - 110 mg/dL    BUN 27 (H) 8 - 23 mg/dL    Creatinine 1.0 0.5 - 1.4 mg/dL    Calcium 10.5 8.7 - 10.5 mg/dL    Total Protein 8.1 6.0 - 8.4 g/dL    Albumin 4.4 3.5 - 5.2 g/dL    Total Bilirubin 0.6 0.1 - 1.0 mg/dL    Alkaline Phosphatase 71 55 - 135 U/L    AST 21 10 - 40 U/L    ALT 16 10 - 44 U/L    Anion Gap 11 8 - 16 mmol/L    eGFR if African American >60 >60 mL/min/1.73 m^2    eGFR if non African American 53 (A) >60 mL/min/1.73 m^2   Lipase   Result Value Ref Range    Lipase 30 4 - 60 U/L   Magnesium   Result Value Ref Range    Magnesium 1.9 1.6 - 2.6 mg/dL   Urinalysis, Reflex to Urine Culture Urine, Catheterized    Specimen: Urine   Result Value Ref Range     Specimen UA Urine, Clean Catch     Color, UA Yellow Yellow, Straw, Izabela    Appearance, UA Clear Clear    pH, UA 6.0 5.0 - 8.0    Specific Gravity, UA >=1.030 (A) 1.005 - 1.030    Protein, UA Trace (A) Negative    Glucose, UA Negative Negative    Ketones, UA Negative Negative    Bilirubin (UA) Negative Negative    Occult Blood UA Negative Negative    Nitrite, UA Negative Negative    Urobilinogen, UA Negative <2.0 EU/dL    Leukocytes, UA Negative Negative       Assessment/Plan:     Bradycardia  Decrease Coreg to 3.125 mg   Spoke with Cardiology and this was the recommendation  Follow up with Cardiology as scheduled     Essential hypertension-controlled    Swelling of both lower extremities-significantly improved       Follow up as needed    Angeles Carson MD  ON   Family Medicine

## 2020-11-19 VITALS
RESPIRATION RATE: 20 BRPM | BODY MASS INDEX: 26.74 KG/M2 | HEART RATE: 59 BPM | DIASTOLIC BLOOD PRESSURE: 83 MMHG | TEMPERATURE: 98 F | SYSTOLIC BLOOD PRESSURE: 157 MMHG | OXYGEN SATURATION: 96 % | HEIGHT: 61 IN

## 2020-11-19 PROCEDURE — 25000003 PHARM REV CODE 250: Performed by: FAMILY MEDICINE

## 2020-11-19 RX ADMIN — SODIUM CHLORIDE 1000 ML: 0.9 INJECTION, SOLUTION INTRAVENOUS at 12:11

## 2020-11-19 NOTE — ED PROVIDER NOTES
SCRIBE #1 NOTE: I, Shaquille Saleh, am scribing for, and in the presence of, Kinjal Dumont MD. I have scribed the entire note.      History      Chief Complaint   Patient presents with    Dizziness     Dizzines when standing. Not orthostatic.       Review of patient's allergies indicates:   Allergen Reactions    Amitriptyline     Hydrochlorothiazide      Causes muscle cramping    Lisinopril      hyperkalemia    Percocet [oxycodone-acetaminophen] Other (See Comments)     Seizures    Codeine Nausea Only and Rash        HPI   HPI    11/19/2020, 12:07 AM   History obtained from the patient      History of Present Illness: Leslie Wood is a 80 y.o. female patient with a PMHx of HTN, stroke who presents to the Emergency Department for dizziness, onset just PTA. Pt was started on Coreg by Dr. Martins (Cardiology) yesterday. Pt states that she had accidentally taken 13 mg Coreg instead of the prescribed 6.5 mg dose today. Symptoms are constant and moderate in severity. No mitigating or exacerbating factors reported. Associated sxs include L-sided neck pain. Patient denies any fever, chills, n/v/d, SOB, CP, weakness, numbness, headache, and all other sxs at this time. No prior Tx reported. Pt is scheduled for a cardiac stress test on 11/27/20. No further complaints or concerns at this time.     Arrival mode: Personal vehicle    PCP: Agneles Carson MD       Past Medical History:  Past Medical History:   Diagnosis Date    Arthritis     Cataract     GERD (gastroesophageal reflux disease)     Hypertension     Stroke        Past Surgical History:  Past Surgical History:   Procedure Laterality Date    ADENOIDECTOMY      ADRENAL GLAND SURGERY      APPENDECTOMY      BACK SURGERY      fusion l 4-5 s 1,2,3  fusion l 2-3    EYE SURGERY      HEMORRHOID SURGERY      HERNIA REPAIR      HYSTERECTOMY      indirect lumbar decompression      percutaneous placement of interspinous extension blocker    TONSILLECTOMY            Family History:  Family History   Problem Relation Age of Onset    Heart disease Mother     Hypertension Mother     Diabetes Mother     Hypertension Father     Kidney disease Father        Social History:  Social History     Tobacco Use    Smoking status: Never Smoker    Smokeless tobacco: Never Used   Substance and Sexual Activity    Alcohol use: No    Drug use: No    Sexual activity: Not Currently       ROS   Review of Systems   Constitutional: Negative for chills and fever.   HENT: Negative for sore throat.    Respiratory: Negative for shortness of breath.    Cardiovascular: Negative for chest pain.   Gastrointestinal: Negative for diarrhea, nausea and vomiting.   Genitourinary: Negative for dysuria.   Musculoskeletal: Positive for neck pain (L). Negative for back pain.   Skin: Negative for rash.   Neurological: Positive for dizziness. Negative for seizures, weakness, light-headedness, numbness and headaches.   Hematological: Does not bruise/bleed easily.   All other systems reviewed and are negative.    Physical Exam      Initial Vitals [11/18/20 2209]   BP Pulse Resp Temp SpO2   (!) 160/70 65 20 98.4 °F (36.9 °C) 95 %      MAP       --          Physical Exam  Nursing Notes and Vital Signs Reviewed.  Constitutional: Patient is in no acute distress. Well-developed and well-nourished.  Head: Atraumatic. Normocephalic.  Eyes: PERRL. EOM intact. Conjunctivae are not pale. No scleral icterus.  ENT: Mucous membranes are moist. Oropharynx is clear and symmetric.    Neck: Supple. Full ROM. No lymphadenopathy.  Cardiovascular: Regular rate. Regular rhythm. No murmurs, rubs, or gallops. Distal pulses are 2+ and symmetric.  Pulmonary/Chest: No respiratory distress. Clear to auscultation bilaterally. No wheezing or rales.  Abdominal: Soft and non-distended.  There is no tenderness.  No rebound, guarding, or rigidity.  Musculoskeletal: Moves all extremities. No obvious deformities. No edema.  Skin: Warm and  "dry.  Neurological:  Alert, awake, and appropriate.  Normal speech.  No acute focal neurological deficits are appreciated.  Psychiatric: Normal affect. Good eye contact. Appropriate in content.    ED Course    Procedures  ED Vital Signs:  Vitals:    11/18/20 2209 11/18/20 2221 11/18/20 2225 11/18/20 2300   BP: (!) 160/70  (!) 153/65 (!) 149/65   Pulse: 65 (!) 59 (!) 59 (!) 58   Resp: 20 20 20   Temp: 98.4 °F (36.9 °C)      TempSrc: Oral      SpO2: 95%  96% 96%   Height: 5' 1" (1.549 m)       11/19/20 0016 11/19/20 0121   BP: (!) 167/71 (!) 157/83   Pulse: (!) 57 (!) 59   Resp: 18 20   Temp:  98.1 °F (36.7 °C)   TempSrc:  Oral   SpO2: 96% 96%   Height:         Abnormal Lab Results:  Labs Reviewed   CBC W/ AUTO DIFFERENTIAL - Abnormal; Notable for the following components:       Result Value    MCHC 30.7 (*)     Mono # 1.4 (*)     All other components within normal limits   COMPREHENSIVE METABOLIC PANEL - Abnormal; Notable for the following components:    CO2 31 (*)     Anion Gap 6 (*)     All other components within normal limits   URINALYSIS   B-TYPE NATRIURETIC PEPTIDE   TROPONIN I        All Lab Results:  Results for orders placed or performed during the hospital encounter of 11/18/20   CBC auto differential   Result Value Ref Range    WBC 9.74 3.90 - 12.70 K/uL    RBC 4.62 4.00 - 5.40 M/uL    Hemoglobin 12.9 12.0 - 16.0 g/dL    Hematocrit 42.0 37.0 - 48.5 %    MCV 91 82 - 98 fL    MCH 27.9 27.0 - 31.0 pg    MCHC 30.7 (L) 32.0 - 36.0 g/dL    RDW 14.2 11.5 - 14.5 %    Platelets 219 150 - 350 K/uL    MPV 9.8 9.2 - 12.9 fL    Immature Granulocytes 0.3 0.0 - 0.5 %    Gran # (ANC) 5.7 1.8 - 7.7 K/uL    Immature Grans (Abs) 0.03 0.00 - 0.04 K/uL    Lymph # 2.5 1.0 - 4.8 K/uL    Mono # 1.4 (H) 0.3 - 1.0 K/uL    Eos # 0.2 0.0 - 0.5 K/uL    Baso # 0.05 0.00 - 0.20 K/uL    nRBC 0 0 /100 WBC    Gran % 58.2 38.0 - 73.0 %    Lymph % 25.3 18.0 - 48.0 %    Mono % 14.1 4.0 - 15.0 %    Eosinophil % 1.6 0.0 - 8.0 %    Basophil % " 0.5 0.0 - 1.9 %    Differential Method Automated    Comprehensive metabolic panel   Result Value Ref Range    Sodium 141 136 - 145 mmol/L    Potassium 4.0 3.5 - 5.1 mmol/L    Chloride 104 95 - 110 mmol/L    CO2 31 (H) 23 - 29 mmol/L    Glucose 100 70 - 110 mg/dL    BUN 14 8 - 23 mg/dL    Creatinine 0.8 0.5 - 1.4 mg/dL    Calcium 9.1 8.7 - 10.5 mg/dL    Total Protein 6.8 6.0 - 8.4 g/dL    Albumin 3.6 3.5 - 5.2 g/dL    Total Bilirubin 0.4 0.1 - 1.0 mg/dL    Alkaline Phosphatase 83 55 - 135 U/L    AST 19 10 - 40 U/L    ALT 17 10 - 44 U/L    Anion Gap 6 (L) 8 - 16 mmol/L    eGFR if African American >60 >60 mL/min/1.73 m^2    eGFR if non African American >60 >60 mL/min/1.73 m^2   Urinalysis - Clean Catch   Result Value Ref Range    Specimen UA Urine, Clean Catch     Color, UA Yellow Yellow, Straw, Izabela    Appearance, UA Clear Clear    pH, UA 6.0 5.0 - 8.0    Specific Gravity, UA 1.020 1.005 - 1.030    Protein, UA Negative Negative    Glucose, UA Negative Negative    Ketones, UA Negative Negative    Bilirubin (UA) Negative Negative    Occult Blood UA Negative Negative    Nitrite, UA Negative Negative    Urobilinogen, UA Negative <2.0 EU/dL    Leukocytes, UA Negative Negative   B-Type natriuretic peptide (BNP)   Result Value Ref Range    BNP 54 0 - 99 pg/mL   Troponin I   Result Value Ref Range    Troponin I <0.006 0.000 - 0.026 ng/mL     Imaging Results:  Imaging Results          CT Head Without Contrast (Final result)  Result time 11/18/20 23:35:36    Final result by Moi Rogers MD (11/18/20 23:35:36)                 Impression:      No acute intracranial CT abnormality.    Chronic changes as above.    All CT scans at this facility are performed  using dose modulation techniques as appropriate to performed exam including the following:  automated exposure control; adjustment of mA and/or kV according to the patients size (this includes techniques or standardized protocols for targeted exams where dose is matched  to indication/reason for exam: i.e. extremities or head);  iterative reconstruction technique.      Electronically signed by: Moi Rogers  Date:    11/18/2020  Time:    23:35             Narrative:    EXAMINATION:  CT HEAD WITHOUT CONTRAST    CLINICAL HISTORY:  Dizziness, persistent/recurrent, cardiac or vascular cause suspected;    TECHNIQUE:  Low dose axial CT images obtained throughout the head without intravenous contrast. Sagittal and coronal reconstructions were performed.    COMPARISON:  10/28/2020.    FINDINGS:  Intracranial compartment:    Ventricles and sulci are normal in size for age without evidence of hydrocephalus. No extra-axial blood or fluid collections.    There is moderate microvascular ischemic change.  Stable right occipital infarct.  No parenchymal mass, hemorrhage, edema or acute major vascular distribution infarct.    Skull/extracranial contents (limited evaluation): No fracture. Mastoid air cells and paranasal sinuses are essentially clear.                               X-Ray Chest AP Portable (Final result)  Result time 11/18/20 23:00:29    Final result by Moi Rogers MD (11/18/20 23:00:29)                 Impression:      No acute abnormality.      Electronically signed by: Moi Rogers  Date:    11/18/2020  Time:    23:00             Narrative:    EXAMINATION:  XR CHEST AP PORTABLE    CLINICAL HISTORY:  Chest Pain;    TECHNIQUE:  Single frontal view of the chest was performed.    COMPARISON:  08/01/2020.    FINDINGS:  The lungs are clear, with normal appearance of pulmonary vasculature and no pleural effusion or pneumothorax.    The cardiac silhouette is normal in size. The hilar and mediastinal contours are unremarkable.  There is aortic atherosclerosis.    Bones are intact.                               The EKG was ordered, reviewed, and independently interpreted by the ED provider.  Interpretation time: 22:44  Rate: 60 BPM  Rhythm: normal sinus rhythm  Interpretation: No  acute ST changes. No STEMI.           The Emergency Provider reviewed the vital signs and test results, which are outlined above.    ED Discussion     12:32 AM: Reassessed pt at this time. Discussed with pt all pertinent ED information and results. Discussed pt dx and plan of tx. Gave pt all f/u and return to the ED instructions. All questions and concerns were addressed at this time. Pt expresses understanding of information and instructions, and is comfortable with plan to discharge. Pt is stable for discharge.    I discussed with patient and/or family/caretaker that evaluation in the ED does not suggest any emergent or life threatening medical conditions requiring immediate intervention beyond what was provided in the ED, and I believe patient is safe for discharge.  Regardless, an unremarkable evaluation in the ED does not preclude the development or presence of a serious of life threatening condition. As such, patient was instructed to return immediately for any worsening or change in current symptoms.         ED Medication(s):  Medications   sodium chloride 0.9% bolus 1,000 mL (0 mLs Intravenous Stopped 11/19/20 0122)       Follow-up Information     Schedule an appointment as soon as possible for a visit  with Angeles Carson MD.    Specialty: Internal Medicine  Why: As needed  Contact information:  [address] [place] [address] [address] Iggy [address]ARRIS [id number]  [phone]                  Discharge Medication List as of 11/19/2020  7:47 AM            Medical Decision Making    Medical Decision Making:   Clinical Tests:   Lab Tests: Ordered and Reviewed  Radiological Study: Ordered and Reviewed  Medical Tests: Ordered and Reviewed           Scribe Attestation:   Scribe #1: I performed the above scribed service and the documentation accurately describes the services I performed. I attest to the accuracy of the note.    Attending:   Physician Attestation Statement for Scribe #1: IKinjal MD, personally performed  the services described in this documentation, as scribed by Shaquille Saleh, in my presence, and it is both accurate and complete.          Clinical Impression       ICD-10-CM ICD-9-CM   1. Dizziness  R42 780.4       Disposition:   Disposition: Discharged  Condition: Stable         Kinjal Dumont MD  11/20/20 8971

## 2020-11-27 ENCOUNTER — TELEPHONE (OUTPATIENT)
Dept: CARDIOLOGY | Facility: HOSPITAL | Age: 80
End: 2020-11-27

## 2020-11-27 ENCOUNTER — HOSPITAL ENCOUNTER (OUTPATIENT)
Dept: RADIOLOGY | Facility: HOSPITAL | Age: 80
Discharge: HOME | End: 2020-11-27
Attending: INTERNAL MEDICINE
Payer: MEDICARE

## 2020-11-27 ENCOUNTER — HOSPITAL ENCOUNTER (OUTPATIENT)
Dept: PULMONOLOGY | Facility: HOSPITAL | Age: 80
Discharge: HOME | End: 2020-11-27
Attending: INTERNAL MEDICINE
Payer: MEDICARE

## 2020-11-27 VITALS
BODY MASS INDEX: 26.62 KG/M2 | WEIGHT: 141 LBS | DIASTOLIC BLOOD PRESSURE: 91 MMHG | HEART RATE: 49 BPM | HEIGHT: 61 IN | SYSTOLIC BLOOD PRESSURE: 179 MMHG

## 2020-11-27 DIAGNOSIS — R07.9 CHEST PAIN, UNSPECIFIED TYPE: ICD-10-CM

## 2020-11-27 DIAGNOSIS — I10 ACCELERATED HYPERTENSION: Primary | ICD-10-CM

## 2020-11-27 LAB
CV STRESS BASE HR: 49 BPM
DIASTOLIC BLOOD PRESSURE: 91 MMHG
EJECTION FRACTION- HIGH: 65 %
END DIASTOLIC INDEX-HIGH: 158 ML/M2
END DIASTOLIC INDEX-LOW: 94 ML/M2
END SYSTOLIC INDEX-HIGH: 71 ML/M2
END SYSTOLIC INDEX-LOW: 33 ML/M2
NUC REST DIASTOLIC VOLUME INDEX: 77
NUC REST EJECTION FRACTION: 66
NUC REST SYSTOLIC VOLUME INDEX: 26
NUC STRESS DIASTOLIC VOLUME INDEX: 88
NUC STRESS EJECTION FRACTION: 71 %
NUC STRESS SYSTOLIC VOLUME INDEX: 26
OHS CV CPX 85 PERCENT MAX PREDICTED HEART RATE MALE: 114
OHS CV CPX MAX PREDICTED HEART RATE: 134
OHS CV CPX PATIENT IS FEMALE: 1
OHS CV CPX PATIENT IS MALE: 0
OHS CV CPX PEAK DIASTOLIC BLOOD PRESSURE: 84 MMHG
OHS CV CPX PEAK HEAR RATE: 77 BPM
OHS CV CPX PEAK RATE PRESSURE PRODUCT: NORMAL
OHS CV CPX PEAK SYSTOLIC BLOOD PRESSURE: 219 MMHG
OHS CV CPX PERCENT MAX PREDICTED HEART RATE ACHIEVED: 58
OHS CV CPX RATE PRESSURE PRODUCT PRESENTING: 8771
RETIRED EF AND QEF - SEE NOTES: 53 %
SYSTOLIC BLOOD PRESSURE: 179 MMHG

## 2020-11-27 PROCEDURE — 25000242 PHARM REV CODE 250 ALT 637 W/ HCPCS

## 2020-11-27 PROCEDURE — A9502 TC99M TETROFOSMIN: HCPCS

## 2020-11-27 PROCEDURE — 63600175 PHARM REV CODE 636 W HCPCS: Performed by: NURSE PRACTITIONER

## 2020-11-27 RX ORDER — NITROGLYCERIN 0.4 MG/1
0.4 TABLET SUBLINGUAL ONCE
Status: COMPLETED | OUTPATIENT
Start: 2020-11-27 | End: 2020-11-27

## 2020-11-27 RX ORDER — REGADENOSON 0.08 MG/ML
0.4 INJECTION, SOLUTION INTRAVENOUS ONCE
Status: COMPLETED | OUTPATIENT
Start: 2020-11-27 | End: 2020-11-27

## 2020-11-27 RX ORDER — ISOSORBIDE MONONITRATE 30 MG/1
30 TABLET, EXTENDED RELEASE ORAL NIGHTLY
Qty: 30 TABLET | Refills: 11 | Status: SHIPPED | OUTPATIENT
Start: 2020-11-27 | End: 2020-12-03 | Stop reason: SDUPTHER

## 2020-11-27 RX ADMIN — REGADENOSON 0.4 MG: 0.08 INJECTION, SOLUTION INTRAVENOUS at 11:11

## 2020-11-27 NOTE — TELEPHONE ENCOUNTER
Patient underwent stress test today with elevated BP during the test.     We had a long discussion regarding her medication regimen. I would like to add Imdur 30 mg nightly to her regimen. She needs to start monitoring her BP twice a day and bring log to next appt.     She needs to be seen this week with BP log to review medications and further adjustments if needed.     Please call patient's daughter to arrange adjustments to medications and her appt next week    Thanks  Angela

## 2020-11-27 NOTE — PROGRESS NOTES
[facility] for elective OP MPI stress test. Post Lexicscan, patient had elevated BP as high as 218/83 with associated chest tightness. Patient received NTG SL 0.4mg x 1 dose at 1007 by me with improvement and complete resolution of symptoms.    BP 1010 185/83  BP 1012 178/83    Discussed with patient regarding medications, patient reports that she did take her AM BP meds prior to leaving the house this AM. She has not been monitoring her BP at home recently due to loss of BP cuff. She needs to start monitoring her BP twice a day and bring log to next clinic visit.     Will add Imdur to 30 mg nightly to her regimen and have her follow up in clinic next week for further adjustments as needed for BP control.     Patient states understanding and she appreciates my attention to this matter.     Nicole May, FNP-C [facility]

## 2020-11-27 NOTE — TELEPHONE ENCOUNTER
Spoke with granddaughter, Ariane.  Advised her that Imdur 30 mg nightly was added to her regimen. She needs to start monitoring her BP twice a day and bring log to next appt.   Denies questions/concerns.  Ariane confirmed appointment place, date, and time.

## 2020-11-30 ENCOUNTER — TELEPHONE (OUTPATIENT)
Dept: INTERNAL MEDICINE | Facility: CLINIC | Age: 80
End: 2020-11-30

## 2020-11-30 ENCOUNTER — EXTERNAL CHRONIC CARE MANAGEMENT (OUTPATIENT)
Dept: PRIMARY CARE CLINIC | Facility: CLINIC | Age: 80
End: 2020-11-30
Payer: MEDICARE

## 2020-11-30 PROCEDURE — 99490 CHRNC CARE MGMT STAFF 1ST 20: CPT | Mod: PBBFAC | Performed by: FAMILY MEDICINE

## 2020-11-30 PROCEDURE — G2058 PR CHRON CARE MGMT, EA ADDTL 20 MINS: ICD-10-PCS | Mod: S$PBB,,, | Performed by: FAMILY MEDICINE

## 2020-11-30 PROCEDURE — 99490 CHRNC CARE MGMT STAFF 1ST 20: CPT | Mod: S$PBB,,, | Performed by: FAMILY MEDICINE

## 2020-11-30 PROCEDURE — G2058 CCM ADD 20MIN: HCPCS | Mod: S$PBB,,, | Performed by: FAMILY MEDICINE

## 2020-11-30 PROCEDURE — G2058 CCM ADD 20MIN: HCPCS | Mod: PBBFAC | Performed by: FAMILY MEDICINE

## 2020-11-30 PROCEDURE — 99490 PR CHRONIC CARE MGMT, 1ST 20 MIN: ICD-10-PCS | Mod: S$PBB,,, | Performed by: FAMILY MEDICINE

## 2020-11-30 NOTE — TELEPHONE ENCOUNTER
----- Message from Zainab Hawley sent at 11/30/2020  9:16 AM CST -----  Regarding: appt inq  .Type:  Sooner Apoointment Request    Caller is requesting a sooner appointment.  Caller declined first available appointment listed below.  Caller will not accept being placed on the waitlist and is requesting a message be sent to doctor.  Name of Caller: pts granddaughter / Ariane   When is the first available appointment?  12/11  Symptoms: 2 wk f/u  Would the patient rather a call back or a response via [facility]? Call back   Best Call Back Number: [phone]   Additional Information:  pt will like to come on 12/3

## 2020-12-03 ENCOUNTER — OFFICE VISIT (OUTPATIENT)
Dept: CARDIOLOGY | Facility: CLINIC | Age: 80
End: 2020-12-03
Payer: MEDICARE

## 2020-12-03 VITALS
HEART RATE: 53 BPM | SYSTOLIC BLOOD PRESSURE: 150 MMHG | HEIGHT: 61 IN | DIASTOLIC BLOOD PRESSURE: 80 MMHG | WEIGHT: 145.06 LBS | BODY MASS INDEX: 27.39 KG/M2 | OXYGEN SATURATION: 97 %

## 2020-12-03 DIAGNOSIS — I16.0 HYPERTENSIVE URGENCY: Primary | ICD-10-CM

## 2020-12-03 DIAGNOSIS — E11.9 TYPE 2 DIABETES MELLITUS WITHOUT COMPLICATION, WITHOUT LONG-TERM CURRENT USE OF INSULIN: ICD-10-CM

## 2020-12-03 DIAGNOSIS — I69.30 LATE EFFECT OF CEREBROVASCULAR ACCIDENT (CVA): ICD-10-CM

## 2020-12-03 DIAGNOSIS — I10 ACCELERATED HYPERTENSION: ICD-10-CM

## 2020-12-03 DIAGNOSIS — Z78.9 STATIN INTOLERANCE: ICD-10-CM

## 2020-12-03 DIAGNOSIS — I10 ESSENTIAL HYPERTENSION: ICD-10-CM

## 2020-12-03 DIAGNOSIS — Z01.89 NEED FOR ASSESSMENT FOR SLEEP APNEA: ICD-10-CM

## 2020-12-03 DIAGNOSIS — E78.5 HYPERLIPIDEMIA LDL GOAL <100: ICD-10-CM

## 2020-12-03 PROCEDURE — 99999 PR PBB SHADOW E&M-EST. PATIENT-LVL IV: ICD-10-PCS | Mod: PBBFAC,,, | Performed by: NURSE PRACTITIONER

## 2020-12-03 PROCEDURE — 99214 PR OFFICE/OUTPT VISIT, EST, LEVL IV, 30-39 MIN: ICD-10-PCS | Mod: S$PBB,,, | Performed by: NURSE PRACTITIONER

## 2020-12-03 PROCEDURE — 99214 OFFICE O/P EST MOD 30 MIN: CPT | Mod: PBBFAC | Performed by: NURSE PRACTITIONER

## 2020-12-03 PROCEDURE — 99214 OFFICE O/P EST MOD 30 MIN: CPT | Mod: S$PBB,,, | Performed by: NURSE PRACTITIONER

## 2020-12-03 PROCEDURE — 99999 PR PBB SHADOW E&M-EST. PATIENT-LVL IV: CPT | Mod: PBBFAC,,, | Performed by: NURSE PRACTITIONER

## 2020-12-03 RX ORDER — ISOSORBIDE MONONITRATE 30 MG/1
30 TABLET, EXTENDED RELEASE ORAL 2 TIMES DAILY
Qty: 60 TABLET | Refills: 11 | Status: SHIPPED | OUTPATIENT
Start: 2020-12-03 | End: 2021-02-11

## 2020-12-03 RX ORDER — CLONIDINE HYDROCHLORIDE 0.1 MG/1
0.1 TABLET ORAL
Status: DISCONTINUED | OUTPATIENT
Start: 2020-12-03 | End: 2021-02-11

## 2020-12-03 RX ORDER — FUROSEMIDE 20 MG/1
20 TABLET ORAL DAILY
Qty: 30 TABLET | Refills: 6 | Status: SHIPPED | OUTPATIENT
Start: 2020-12-03 | End: 2021-03-04 | Stop reason: SDUPTHER

## 2020-12-04 ENCOUNTER — DOCUMENT SCAN (OUTPATIENT)
Dept: HOME HEALTH SERVICES | Facility: HOSPITAL | Age: 80
End: 2020-12-04
Payer: MEDICARE

## 2020-12-11 ENCOUNTER — PATIENT MESSAGE (OUTPATIENT)
Dept: OTHER | Facility: OTHER | Age: 80
End: 2020-12-11

## 2020-12-17 ENCOUNTER — LAB VISIT (OUTPATIENT)
Dept: LAB | Facility: HOSPITAL | Age: 80
End: 2020-12-17
Attending: NURSE PRACTITIONER
Payer: MEDICARE

## 2020-12-17 ENCOUNTER — OFFICE VISIT (OUTPATIENT)
Dept: CARDIOLOGY | Facility: CLINIC | Age: 80
End: 2020-12-17
Payer: MEDICARE

## 2020-12-17 VITALS
SYSTOLIC BLOOD PRESSURE: 128 MMHG | HEART RATE: 51 BPM | BODY MASS INDEX: 27.41 KG/M2 | WEIGHT: 145.06 LBS | OXYGEN SATURATION: 96 % | DIASTOLIC BLOOD PRESSURE: 88 MMHG

## 2020-12-17 DIAGNOSIS — R51.9 TEMPORAL PAIN: Primary | ICD-10-CM

## 2020-12-17 DIAGNOSIS — I16.0 HYPERTENSIVE URGENCY: ICD-10-CM

## 2020-12-17 PROCEDURE — 99213 OFFICE O/P EST LOW 20 MIN: CPT | Mod: S$PBB,,, | Performed by: INTERNAL MEDICINE

## 2020-12-17 PROCEDURE — 99212 OFFICE O/P EST SF 10 MIN: CPT | Mod: PBBFAC | Performed by: INTERNAL MEDICINE

## 2020-12-17 PROCEDURE — 83880 ASSAY OF NATRIURETIC PEPTIDE: CPT

## 2020-12-17 PROCEDURE — 99999 PR PBB SHADOW E&M-EST. PATIENT-LVL II: ICD-10-PCS | Mod: PBBFAC,,, | Performed by: INTERNAL MEDICINE

## 2020-12-17 PROCEDURE — 99999 PR PBB SHADOW E&M-EST. PATIENT-LVL II: CPT | Mod: PBBFAC,,, | Performed by: INTERNAL MEDICINE

## 2020-12-17 PROCEDURE — 36415 COLL VENOUS BLD VENIPUNCTURE: CPT

## 2020-12-17 PROCEDURE — 80048 BASIC METABOLIC PNL TOTAL CA: CPT

## 2020-12-17 PROCEDURE — 99213 PR OFFICE/OUTPT VISIT, EST, LEVL III, 20-29 MIN: ICD-10-PCS | Mod: S$PBB,,, | Performed by: INTERNAL MEDICINE

## 2020-12-17 NOTE — PROGRESS NOTES
Subjective:   Patient ID:  Leslie Wood is a 80 y.o. female who presents for evaluation of No chief complaint on file.      Initial HPI: 10/1/2020 Patient 80-year-old, prediabetic, with history of hypertension, who recently had a stroke about 3 months ago and was hospitalized at [facility].  She does not recall what kind of cardiac workup she underwent while at that hospital.  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  Her left-sided weakness has improved since the stroke.  She is undergoing rehab.  And should undergo speech therapy soon as she states.  She reports compliance with her blood pressure medications, however she reports feeling dizzy when she stands up, she does not get dizzy or any other circumstances.  She states that she is not drinking enough water.  Also she complains of bilateral lower extremity mild swelling that comes on and off when she takes or not take her amlodipine.  She denies any chest pain, dyspnea, orthopnea, PND, palpitations, syncope, presyncope, bleeding.      Interval hx: 11/2020  Since last visit, been having chest pain for a month , some weeks daily, 6/10, pressure like, not radiating, not worse or better with exercise and resting, resolve on their own after 10 mins, she also reports labored breathing when these episodes happen.     Interval hjx: 12/17/2020  Denies any further chest pains, no dyspnea   Was see two weeks ago and found to have  , started on imdur   BP controlled today  Complains of right temporal pain today, denies any vision loss , pain 6/10, intermittent, also has TMJ  Complains of rest calf and thighs tenderness , on lipitor 40 mg, LDL 37       Past Medical History:   Diagnosis Date    Arthritis     Cataract     GERD (gastroesophageal reflux disease)     Hyperlipidemia     Hypertension     Stroke        Past Surgical History:   Procedure Laterality Date    ADENOIDECTOMY      ADRENAL GLAND SURGERY      APPENDECTOMY      BACK  SURGERY      fusion l 4-5 s 1,2,3  fusion l 2-3    EYE SURGERY      HEMORRHOID SURGERY      HERNIA REPAIR      HYSTERECTOMY      indirect lumbar decompression      percutaneous placement of interspinous extension blocker    TONSILLECTOMY         Social History     Tobacco Use    Smoking status: Never Smoker    Smokeless tobacco: Never Used   Substance Use Topics    Alcohol use: No    Drug use: No       Family History   Problem Relation Age of Onset    Heart disease Mother     Hypertension Mother     Diabetes Mother     Hypertension Father     Kidney disease Father        Review of Systems   Constitution: Negative for fever and malaise/fatigue.   HENT: Negative for sore throat.    Eyes: Negative for blurred vision.   Cardiovascular: Negative for chest pain, claudication, cyanosis, dyspnea on exertion, irregular heartbeat, leg swelling, near-syncope, orthopnea, palpitations, paroxysmal nocturnal dyspnea and syncope.   Respiratory: Negative for cough, hemoptysis and shortness of breath.    Hematologic/Lymphatic: Negative for bleeding problem.   Skin: Negative for poor wound healing and rash.   Musculoskeletal: Negative for back pain and falls.   Gastrointestinal: Negative for abdominal pain.   Genitourinary: Negative for nocturia.   Neurological: Positive for headaches. Negative for light-headedness and loss of balance.   Psychiatric/Behavioral: Negative for altered mental status and substance abuse.       Current Outpatient Medications on File Prior to Visit   Medication Sig    amLODIPine (NORVASC) 5 MG tablet Take 1 tablet (5 mg total) by mouth once daily.    aspirin (ECOTRIN) 81 MG EC tablet Take 81 mg by mouth once daily.    carvediloL (COREG) 6.25 MG tablet Take 1 tablet (6.25 mg total) by mouth 2 (two) times daily. TAKE (1) TABLET BY MOUTH 2 TIMES A DAY WITH MEALS    furosemide (LASIX) 20 MG tablet Take 1 tablet (20 mg total) by mouth once daily.    HYDROcodone-acetaminophen (NORCO)  mg  per tablet Take 1 tablet by mouth.    isosorbide mononitrate (IMDUR) 30 MG 24 hr tablet Take 1 tablet (30 mg total) by mouth 2 (two) times a day.    ondansetron (ZOFRAN) 4 MG tablet Take 1 tablet (4 mg total) by mouth every 6 (six) hours. (Patient not taking: Reported on 11/18/2020)    tizanidine (ZANAFLEX) 4 MG tablet Take 1 tablet (4 mg total) by mouth every 6 (six) hours as needed (HOLD while on CIPRO).    traZODone (DESYREL) 50 MG tablet TAKE 1 TABLET AT BEDTIME AS NEEDED FOR INSOMNIA    valsartan (DIOVAN) 160 MG tablet Take 0.5 tablets (80 mg total) by mouth 2 (two) times daily.     Current Facility-Administered Medications on File Prior to Visit   Medication    cloNIDine tablet 0.1 mg       Objective:   Objective:  Wt Readings from Last 3 Encounters:   12/17/20 65.8 kg (145 lb 1 oz)   12/03/20 65.8 kg (145 lb 1 oz)   11/27/20 64 kg (141 lb)     Temp Readings from Last 3 Encounters:   11/19/20 98.1 °F (36.7 °C) (Oral)   11/18/20 96.3 °F (35.7 °C) (Tympanic)   11/12/20 97.6 °F (36.4 °C) (Tympanic)     BP Readings from Last 3 Encounters:   12/17/20 128/88   12/03/20 (!) 150/80   11/27/20 (!) 179/91     Pulse Readings from Last 3 Encounters:   12/17/20 (!) 51   12/03/20 (!) 53   11/27/20 (!) 49       Physical Exam   Constitutional: She is oriented to person, place, and time. She appears well-developed and well-nourished.   HENT:   Head: Normocephalic and atraumatic.   Eyes: Conjunctivae are normal. No scleral icterus.   Neck: Normal range of motion. Neck supple.   Cardiovascular: Normal rate, regular rhythm, normal heart sounds and intact distal pulses.   No murmur heard.  Pulmonary/Chest: No respiratory distress. She has no wheezes. She has no rales. She exhibits no tenderness.   Abdominal: Soft. Bowel sounds are normal. She exhibits no distension. There is no guarding.   Musculoskeletal: Normal range of motion.   Neurological: She is alert and oriented to person, place, and time.   Skin: Skin is warm.    Psychiatric: She has a normal mood and affect.   Vitals reviewed.      Lab Results   Component Value Date    CHOL 101 (L) 09/16/2020     Lab Results   Component Value Date    HDL 46 09/16/2020     Lab Results   Component Value Date    LDLCALC 37.0 (L) 09/16/2020     Lab Results   Component Value Date    TRIG 90 09/16/2020     Lab Results   Component Value Date    CHOLHDL 45.5 09/16/2020       Chemistry        Component Value Date/Time     11/18/2020 2249    K 4.0 11/18/2020 2249     11/18/2020 2249    CO2 31 (H) 11/18/2020 2249    BUN 14 11/18/2020 2249    CREATININE 0.8 11/18/2020 2249     11/18/2020 2249        Component Value Date/Time    CALCIUM 9.1 11/18/2020 2249    ALKPHOS 83 11/18/2020 2249    AST 19 11/18/2020 2249    ALT 17 11/18/2020 2249    BILITOT 0.4 11/18/2020 2249    ESTGFRAFRICA >60 11/18/2020 2249    EGFRNONAA >60 11/18/2020 2249          Lab Results   Component Value Date    TSH 1.549 12/02/2019     Lab Results   Component Value Date    INR 1.0 08/28/2013     Lab Results   Component Value Date    WBC 9.74 11/18/2020    HGB 12.9 11/18/2020    HCT 42.0 11/18/2020    MCV 91 11/18/2020     11/18/2020     BNP  @LABRCNTIP(BNP,BNPTRIAGEBLO)@  CrCl cannot be calculated (Patient's most recent lab result is older than the maximum 7 days allowed.).     Imaging:  ======  Results for orders placed during the hospital encounter of 03/02/20   Echo Color Flow Doppler? Yes    Narrative · Mild concentric left ventricular hypertrophy.  · Normal left ventricular systolic function. The estimated ejection   fraction is 60%.  · Normal LV diastolic function.  · Normal right ventricular systolic function.  · Mild aortic regurgitation.  · Normal central venous pressure (3 mmHg).  · Trivial pericardial effusion.        No results found for this or any previous visit.  No results found for this or any previous visit.  Results for orders placed during the hospital encounter of 05/11/17   X-Ray  Chest PA And Lateral    Narrative XR CHEST PA AND LATERAL, 05/11/17 11:38:46    Clinical indication: Chest pain.    Findings:  Comparison with 05/10/2017.    Epidural leads within the dorsal thoracic spine.  Lower thoracic wedge compression fracture status post kyphoplasty.  Left upper quadrant surgical clips.    Heart size is normal.  Prominent left pericardial fat pad.    Aortic arch calcification.    Lung fields remain clear.    Impression      Stable chest x-ray.  No acute findings.      Electronically signed by: EMERY SAMAYOA MD  Date:     05/11/17  Time:    12:07      No results found for this or any previous visit.  No procedure found.    Diagnostic Results:  ECG: Reviewed  Marked sinus bradycardia with sinus arrhythmia   Abnormal ECG   When compared with ECG of 28-OCT-2020 16:09,   Criteria for Septal infarct are no longer Present   T wave inversion no longer evident in Anterior leads   Confirmed by MD RODERICK, BARB ([id number]) on 11/12/2020 9:25:42 PM     The ASCVD Risk score ([place] DC Jr., et al., 2013) failed to calculate for the following reasons:    The 2013 ASCVD risk score is only valid for ages 40 to 79    The patient has a prior MI or stroke diagnosis    Assessment and Plan:   Temporal pain  -     Sedimentation rate; Future; Expected date: 12/17/2020      Chest pain, unspecified type, RESOLVED  Comments:  partially reproducible on palpation  Orders:  -     Nuclear Stress - Normal     Essential hypertension  -     carvediloL (COREG) 12.5 bid, calli but asymptomatic   - imdur 30 mg daily , diovan 80, norvasc 5 mg      Sinus bradycardia   Myalgias , hold lipitor , LDL 37        Follow up in 6 months

## 2020-12-18 LAB
ANION GAP SERPL CALC-SCNC: 11 MMOL/L (ref 8–16)
BNP SERPL-MCNC: <10 PG/ML (ref 0–99)
BUN SERPL-MCNC: 17 MG/DL (ref 8–23)
CALCIUM SERPL-MCNC: 9.7 MG/DL (ref 8.7–10.5)
CHLORIDE SERPL-SCNC: 103 MMOL/L (ref 95–110)
CO2 SERPL-SCNC: 32 MMOL/L (ref 23–29)
CREAT SERPL-MCNC: 0.8 MG/DL (ref 0.5–1.4)
EST. GFR  (AFRICAN AMERICAN): >60 ML/MIN/1.73 M^2
EST. GFR  (NON AFRICAN AMERICAN): >60 ML/MIN/1.73 M^2
GLUCOSE SERPL-MCNC: 101 MG/DL (ref 70–110)
POTASSIUM SERPL-SCNC: 3.5 MMOL/L (ref 3.5–5.1)
SODIUM SERPL-SCNC: 146 MMOL/L (ref 136–145)

## 2020-12-31 ENCOUNTER — EXTERNAL CHRONIC CARE MANAGEMENT (OUTPATIENT)
Dept: PRIMARY CARE CLINIC | Facility: CLINIC | Age: 80
End: 2020-12-31
Payer: MEDICARE

## 2020-12-31 PROCEDURE — 99490 CHRNC CARE MGMT STAFF 1ST 20: CPT | Mod: S$PBB,,, | Performed by: FAMILY MEDICINE

## 2020-12-31 PROCEDURE — 99490 CHRNC CARE MGMT STAFF 1ST 20: CPT | Mod: PBBFAC | Performed by: FAMILY MEDICINE

## 2020-12-31 PROCEDURE — 99490 PR CHRONIC CARE MGMT, 1ST 20 MIN: ICD-10-PCS | Mod: S$PBB,,, | Performed by: FAMILY MEDICINE

## 2021-01-12 ENCOUNTER — OFFICE VISIT (OUTPATIENT)
Dept: INTERNAL MEDICINE | Facility: CLINIC | Age: 81
End: 2021-01-12
Payer: MEDICARE

## 2021-01-12 ENCOUNTER — LAB VISIT (OUTPATIENT)
Dept: LAB | Facility: HOSPITAL | Age: 81
End: 2021-01-12
Payer: MEDICARE

## 2021-01-12 VITALS
SYSTOLIC BLOOD PRESSURE: 158 MMHG | HEIGHT: 61 IN | TEMPERATURE: 98 F | HEART RATE: 62 BPM | OXYGEN SATURATION: 96 % | WEIGHT: 138.88 LBS | BODY MASS INDEX: 26.22 KG/M2 | DIASTOLIC BLOOD PRESSURE: 62 MMHG

## 2021-01-12 DIAGNOSIS — R51.9 TEMPORAL PAIN: ICD-10-CM

## 2021-01-12 DIAGNOSIS — I10 ESSENTIAL HYPERTENSION: Primary | ICD-10-CM

## 2021-01-12 PROBLEM — N39.0 URINARY TRACT INFECTION WITHOUT HEMATURIA: Status: RESOLVED | Noted: 2017-05-11 | Resolved: 2021-01-12

## 2021-01-12 LAB
CRP SERPL-MCNC: 0.7 MG/L (ref 0–8.2)
ERYTHROCYTE [SEDIMENTATION RATE] IN BLOOD BY WESTERGREN METHOD: 13 MM/HR (ref 0–20)

## 2021-01-12 PROCEDURE — 99999 PR PBB SHADOW E&M-EST. PATIENT-LVL IV: ICD-10-PCS | Mod: PBBFAC,,, | Performed by: PHYSICIAN ASSISTANT

## 2021-01-12 PROCEDURE — 99214 OFFICE O/P EST MOD 30 MIN: CPT | Mod: S$PBB,,, | Performed by: PHYSICIAN ASSISTANT

## 2021-01-12 PROCEDURE — 99214 PR OFFICE/OUTPT VISIT, EST, LEVL IV, 30-39 MIN: ICD-10-PCS | Mod: S$PBB,,, | Performed by: PHYSICIAN ASSISTANT

## 2021-01-12 PROCEDURE — 86140 C-REACTIVE PROTEIN: CPT

## 2021-01-12 PROCEDURE — 85651 RBC SED RATE NONAUTOMATED: CPT

## 2021-01-12 PROCEDURE — 99214 OFFICE O/P EST MOD 30 MIN: CPT | Mod: PBBFAC | Performed by: PHYSICIAN ASSISTANT

## 2021-01-12 PROCEDURE — 99999 PR PBB SHADOW E&M-EST. PATIENT-LVL IV: CPT | Mod: PBBFAC,,, | Performed by: PHYSICIAN ASSISTANT

## 2021-01-12 PROCEDURE — 36415 COLL VENOUS BLD VENIPUNCTURE: CPT

## 2021-01-19 DIAGNOSIS — F51.01 PRIMARY INSOMNIA: ICD-10-CM

## 2021-01-19 RX ORDER — TRAZODONE HYDROCHLORIDE 50 MG/1
TABLET ORAL
Qty: 90 TABLET | Refills: 0 | Status: SHIPPED | OUTPATIENT
Start: 2021-01-19 | End: 2021-03-04 | Stop reason: SDUPTHER

## 2021-01-31 ENCOUNTER — EXTERNAL CHRONIC CARE MANAGEMENT (OUTPATIENT)
Dept: PRIMARY CARE CLINIC | Facility: CLINIC | Age: 81
End: 2021-01-31
Payer: MEDICARE

## 2021-01-31 PROCEDURE — 99490 CHRNC CARE MGMT STAFF 1ST 20: CPT | Mod: PBBFAC | Performed by: FAMILY MEDICINE

## 2021-01-31 PROCEDURE — 99490 CHRNC CARE MGMT STAFF 1ST 20: CPT | Mod: S$PBB,,, | Performed by: FAMILY MEDICINE

## 2021-01-31 PROCEDURE — 99490 PR CHRONIC CARE MGMT, 1ST 20 MIN: ICD-10-PCS | Mod: S$PBB,,, | Performed by: FAMILY MEDICINE

## 2021-02-11 ENCOUNTER — OFFICE VISIT (OUTPATIENT)
Dept: CARDIOLOGY | Facility: CLINIC | Age: 81
End: 2021-02-11
Payer: MEDICARE

## 2021-02-11 VITALS
OXYGEN SATURATION: 97 % | SYSTOLIC BLOOD PRESSURE: 152 MMHG | HEART RATE: 57 BPM | BODY MASS INDEX: 26.07 KG/M2 | DIASTOLIC BLOOD PRESSURE: 78 MMHG | WEIGHT: 138 LBS

## 2021-02-11 DIAGNOSIS — I10 ESSENTIAL HYPERTENSION: ICD-10-CM

## 2021-02-11 PROCEDURE — 99214 PR OFFICE/OUTPT VISIT, EST, LEVL IV, 30-39 MIN: ICD-10-PCS | Mod: S$PBB,,, | Performed by: INTERNAL MEDICINE

## 2021-02-11 PROCEDURE — 99213 OFFICE O/P EST LOW 20 MIN: CPT | Mod: PBBFAC | Performed by: INTERNAL MEDICINE

## 2021-02-11 PROCEDURE — 99999 PR PBB SHADOW E&M-EST. PATIENT-LVL III: ICD-10-PCS | Mod: PBBFAC,,, | Performed by: INTERNAL MEDICINE

## 2021-02-11 PROCEDURE — 99214 OFFICE O/P EST MOD 30 MIN: CPT | Mod: S$PBB,,, | Performed by: INTERNAL MEDICINE

## 2021-02-11 PROCEDURE — 99999 PR PBB SHADOW E&M-EST. PATIENT-LVL III: CPT | Mod: PBBFAC,,, | Performed by: INTERNAL MEDICINE

## 2021-02-11 RX ORDER — VALSARTAN 160 MG/1
160 TABLET ORAL 2 TIMES DAILY
Qty: 180 TABLET | Refills: 3 | Status: SHIPPED | OUTPATIENT
Start: 2021-02-11 | End: 2022-01-05

## 2021-02-11 RX ORDER — PRAZOSIN HYDROCHLORIDE 1 MG/1
1 CAPSULE ORAL 2 TIMES DAILY
Qty: 60 CAPSULE | Refills: 11 | Status: SHIPPED | OUTPATIENT
Start: 2021-02-11 | End: 2021-11-17

## 2021-02-12 ENCOUNTER — TELEPHONE (OUTPATIENT)
Dept: NEUROLOGY | Facility: CLINIC | Age: 81
End: 2021-02-12

## 2021-02-17 DIAGNOSIS — Z86.73 HISTORY OF CVA (CEREBROVASCULAR ACCIDENT): Primary | ICD-10-CM

## 2021-02-28 ENCOUNTER — EXTERNAL CHRONIC CARE MANAGEMENT (OUTPATIENT)
Dept: PRIMARY CARE CLINIC | Facility: CLINIC | Age: 81
End: 2021-02-28
Payer: MEDICARE

## 2021-02-28 PROCEDURE — 99490 PR CHRONIC CARE MGMT, 1ST 20 MIN: ICD-10-PCS | Mod: S$PBB,,, | Performed by: FAMILY MEDICINE

## 2021-02-28 PROCEDURE — 99439 CHRNC CARE MGMT STAF EA ADDL: CPT | Mod: PBBFAC | Performed by: FAMILY MEDICINE

## 2021-02-28 PROCEDURE — 99439 CHRNC CARE MGMT STAF EA ADDL: CPT | Mod: S$PBB,,, | Performed by: FAMILY MEDICINE

## 2021-02-28 PROCEDURE — 99490 CHRNC CARE MGMT STAFF 1ST 20: CPT | Mod: S$PBB,,, | Performed by: FAMILY MEDICINE

## 2021-02-28 PROCEDURE — 99490 CHRNC CARE MGMT STAFF 1ST 20: CPT | Mod: PBBFAC | Performed by: FAMILY MEDICINE

## 2021-02-28 PROCEDURE — 99439 PR CHRONIC CARE MGMT, EA ADDTL 20 MIN: ICD-10-PCS | Mod: S$PBB,,, | Performed by: FAMILY MEDICINE

## 2021-03-04 ENCOUNTER — PATIENT OUTREACH (OUTPATIENT)
Dept: ADMINISTRATIVE | Facility: OTHER | Age: 81
End: 2021-03-04

## 2021-03-04 ENCOUNTER — OFFICE VISIT (OUTPATIENT)
Dept: ORTHOPEDICS | Facility: CLINIC | Age: 81
End: 2021-03-04
Payer: MEDICARE

## 2021-03-04 ENCOUNTER — OFFICE VISIT (OUTPATIENT)
Dept: INTERNAL MEDICINE | Facility: CLINIC | Age: 81
End: 2021-03-04
Payer: MEDICARE

## 2021-03-04 ENCOUNTER — HOSPITAL ENCOUNTER (OUTPATIENT)
Dept: RADIOLOGY | Facility: HOSPITAL | Age: 81
Discharge: HOME | End: 2021-03-04
Attending: PHYSICIAN ASSISTANT
Payer: MEDICARE

## 2021-03-04 VITALS
WEIGHT: 136 LBS | DIASTOLIC BLOOD PRESSURE: 80 MMHG | HEART RATE: 56 BPM | HEIGHT: 61 IN | SYSTOLIC BLOOD PRESSURE: 179 MMHG | BODY MASS INDEX: 25.68 KG/M2

## 2021-03-04 VITALS
SYSTOLIC BLOOD PRESSURE: 104 MMHG | OXYGEN SATURATION: 95 % | HEART RATE: 60 BPM | BODY MASS INDEX: 25.81 KG/M2 | TEMPERATURE: 98 F | WEIGHT: 136.69 LBS | HEIGHT: 61 IN | DIASTOLIC BLOOD PRESSURE: 66 MMHG

## 2021-03-04 DIAGNOSIS — M17.11 PRIMARY OSTEOARTHRITIS OF RIGHT KNEE: ICD-10-CM

## 2021-03-04 DIAGNOSIS — I10 ACCELERATED HYPERTENSION: ICD-10-CM

## 2021-03-04 DIAGNOSIS — M25.461 PAIN AND SWELLING OF RIGHT KNEE: ICD-10-CM

## 2021-03-04 DIAGNOSIS — M25.561 RIGHT KNEE PAIN, UNSPECIFIED CHRONICITY: Primary | ICD-10-CM

## 2021-03-04 DIAGNOSIS — M25.561 ACUTE PAIN OF RIGHT KNEE: ICD-10-CM

## 2021-03-04 DIAGNOSIS — Z78.0 POSTMENOPAUSAL: Primary | ICD-10-CM

## 2021-03-04 DIAGNOSIS — M46.1 SACROILIITIS, NOT ELSEWHERE CLASSIFIED: ICD-10-CM

## 2021-03-04 DIAGNOSIS — F51.01 PRIMARY INSOMNIA: ICD-10-CM

## 2021-03-04 DIAGNOSIS — I69.354 HEMIPLEGIA AND HEMIPARESIS FOLLOWING CEREBRAL INFARCTION AFFECTING LEFT NON-DOMINANT SIDE: ICD-10-CM

## 2021-03-04 DIAGNOSIS — R73.03 PREDIABETES: ICD-10-CM

## 2021-03-04 DIAGNOSIS — I10 ESSENTIAL HYPERTENSION: ICD-10-CM

## 2021-03-04 DIAGNOSIS — I20.89 OTHER FORMS OF ANGINA PECTORIS: ICD-10-CM

## 2021-03-04 DIAGNOSIS — M25.561 RIGHT KNEE PAIN, UNSPECIFIED CHRONICITY: ICD-10-CM

## 2021-03-04 DIAGNOSIS — D89.89 OTHER SPECIFIED DISORDERS INVOLVING THE IMMUNE MECHANISM, NOT ELSEWHERE CLASSIFIED: ICD-10-CM

## 2021-03-04 DIAGNOSIS — M25.561 PAIN AND SWELLING OF RIGHT KNEE: ICD-10-CM

## 2021-03-04 DIAGNOSIS — I16.0 HYPERTENSIVE URGENCY: ICD-10-CM

## 2021-03-04 PROCEDURE — 99213 OFFICE O/P EST LOW 20 MIN: CPT | Mod: PBBFAC,25,27 | Performed by: FAMILY MEDICINE

## 2021-03-04 PROCEDURE — 20610 DRAIN/INJ JOINT/BURSA W/O US: CPT | Mod: PBBFAC | Performed by: PHYSICIAN ASSISTANT

## 2021-03-04 PROCEDURE — 73562 XR KNEE ORTHO RIGHT WITH FLEXION: ICD-10-PCS | Mod: 26,LT,, | Performed by: RADIOLOGY

## 2021-03-04 PROCEDURE — 73564 X-RAY EXAM KNEE 4 OR MORE: CPT | Mod: TC,RT

## 2021-03-04 PROCEDURE — 20610 LARGE JOINT ASPIRATION/INJECTION: R KNEE: ICD-10-PCS | Mod: S$PBB,RT,, | Performed by: PHYSICIAN ASSISTANT

## 2021-03-04 PROCEDURE — 99214 OFFICE O/P EST MOD 30 MIN: CPT | Mod: S$PBB,,, | Performed by: FAMILY MEDICINE

## 2021-03-04 PROCEDURE — 73562 X-RAY EXAM OF KNEE 3: CPT | Mod: 26,LT,, | Performed by: RADIOLOGY

## 2021-03-04 PROCEDURE — 73564 XR KNEE ORTHO RIGHT WITH FLEXION: ICD-10-PCS | Mod: 26,RT,, | Performed by: RADIOLOGY

## 2021-03-04 PROCEDURE — 99213 OFFICE O/P EST LOW 20 MIN: CPT | Mod: 25,S$PBB,, | Performed by: PHYSICIAN ASSISTANT

## 2021-03-04 PROCEDURE — 99999 PR PBB SHADOW E&M-EST. PATIENT-LVL V: ICD-10-PCS | Mod: PBBFAC,,, | Performed by: PHYSICIAN ASSISTANT

## 2021-03-04 PROCEDURE — 99215 OFFICE O/P EST HI 40 MIN: CPT | Mod: PBBFAC,25 | Performed by: PHYSICIAN ASSISTANT

## 2021-03-04 PROCEDURE — 20610 DRAIN/INJ JOINT/BURSA W/O US: CPT | Mod: S$PBB,RT,, | Performed by: PHYSICIAN ASSISTANT

## 2021-03-04 PROCEDURE — 99999 PR PBB SHADOW E&M-EST. PATIENT-LVL III: ICD-10-PCS | Mod: PBBFAC,,, | Performed by: FAMILY MEDICINE

## 2021-03-04 PROCEDURE — 73564 X-RAY EXAM KNEE 4 OR MORE: CPT | Mod: 26,RT,, | Performed by: RADIOLOGY

## 2021-03-04 PROCEDURE — 99999 PR PBB SHADOW E&M-EST. PATIENT-LVL V: CPT | Mod: PBBFAC,,, | Performed by: PHYSICIAN ASSISTANT

## 2021-03-04 PROCEDURE — 99213 PR OFFICE/OUTPT VISIT, EST, LEVL III, 20-29 MIN: ICD-10-PCS | Mod: 25,S$PBB,, | Performed by: PHYSICIAN ASSISTANT

## 2021-03-04 PROCEDURE — 99999 PR PBB SHADOW E&M-EST. PATIENT-LVL III: CPT | Mod: PBBFAC,,, | Performed by: FAMILY MEDICINE

## 2021-03-04 PROCEDURE — 99214 PR OFFICE/OUTPT VISIT, EST, LEVL IV, 30-39 MIN: ICD-10-PCS | Mod: S$PBB,,, | Performed by: FAMILY MEDICINE

## 2021-03-04 RX ORDER — METHYLPREDNISOLONE ACETATE 80 MG/ML
80 INJECTION, SUSPENSION INTRA-ARTICULAR; INTRALESIONAL; INTRAMUSCULAR; SOFT TISSUE
Status: DISCONTINUED | OUTPATIENT
Start: 2021-03-04 | End: 2021-03-04 | Stop reason: HOSPADM

## 2021-03-04 RX ORDER — TRAZODONE HYDROCHLORIDE 50 MG/1
50 TABLET ORAL NIGHTLY
Qty: 90 TABLET | Refills: 0 | Status: SHIPPED | OUTPATIENT
Start: 2021-03-04 | End: 2021-07-26

## 2021-03-04 RX ORDER — FUROSEMIDE 20 MG/1
20 TABLET ORAL DAILY PRN
Qty: 30 TABLET | Refills: 6 | Status: SHIPPED | OUTPATIENT
Start: 2021-03-04 | End: 2021-11-17

## 2021-03-04 RX ORDER — DICLOFENAC SODIUM 10 MG/G
2 GEL TOPICAL 3 TIMES DAILY PRN
Qty: 2 TUBE | Refills: 6 | Status: SHIPPED | OUTPATIENT
Start: 2021-03-04 | End: 2021-11-17 | Stop reason: SDUPTHER

## 2021-03-04 RX ORDER — DICLOFENAC SODIUM 10 MG/G
GEL TOPICAL
COMMUNITY
Start: 2020-12-16 | End: 2021-12-06 | Stop reason: CLARIF

## 2021-03-04 RX ADMIN — METHYLPREDNISOLONE ACETATE 80 MG: 80 INJECTION, SUSPENSION INTRALESIONAL; INTRAMUSCULAR; INTRASYNOVIAL; SOFT TISSUE at 01:03

## 2021-03-15 ENCOUNTER — TELEPHONE (OUTPATIENT)
Dept: INTERNAL MEDICINE | Facility: CLINIC | Age: 81
End: 2021-03-15

## 2021-03-18 ENCOUNTER — OFFICE VISIT (OUTPATIENT)
Dept: ORTHOPEDICS | Facility: CLINIC | Age: 81
End: 2021-03-18
Payer: MEDICARE

## 2021-03-18 ENCOUNTER — HOSPITAL ENCOUNTER (OUTPATIENT)
Dept: RADIOLOGY | Facility: HOSPITAL | Age: 81
Discharge: HOME | End: 2021-03-18
Attending: ORTHOPAEDIC SURGERY
Payer: MEDICARE

## 2021-03-18 VITALS
DIASTOLIC BLOOD PRESSURE: 89 MMHG | WEIGHT: 136 LBS | BODY MASS INDEX: 25.68 KG/M2 | SYSTOLIC BLOOD PRESSURE: 158 MMHG | HEIGHT: 61 IN

## 2021-03-18 DIAGNOSIS — M25.562 PAIN IN BOTH KNEES, UNSPECIFIED CHRONICITY: Primary | ICD-10-CM

## 2021-03-18 DIAGNOSIS — M25.561 PAIN IN BOTH KNEES, UNSPECIFIED CHRONICITY: Primary | ICD-10-CM

## 2021-03-18 DIAGNOSIS — R26.89 BALANCE PROBLEM: ICD-10-CM

## 2021-03-18 DIAGNOSIS — M17.11 PRIMARY OSTEOARTHRITIS OF RIGHT KNEE: Primary | ICD-10-CM

## 2021-03-18 DIAGNOSIS — S89.92XA INJURY OF LEFT KNEE, INITIAL ENCOUNTER: ICD-10-CM

## 2021-03-18 DIAGNOSIS — M25.561 PAIN IN BOTH KNEES, UNSPECIFIED CHRONICITY: ICD-10-CM

## 2021-03-18 DIAGNOSIS — M25.562 PAIN IN BOTH KNEES, UNSPECIFIED CHRONICITY: ICD-10-CM

## 2021-03-18 DIAGNOSIS — M25.462 EFFUSION OF LEFT KNEE: ICD-10-CM

## 2021-03-18 PROCEDURE — 99214 PR OFFICE/OUTPT VISIT, EST, LEVL IV, 30-39 MIN: ICD-10-PCS | Mod: S$PBB,25,, | Performed by: ORTHOPAEDIC SURGERY

## 2021-03-18 PROCEDURE — 99999 PR PBB SHADOW E&M-EST. PATIENT-LVL III: CPT | Mod: PBBFAC,,, | Performed by: ORTHOPAEDIC SURGERY

## 2021-03-18 PROCEDURE — 20610 DRAIN/INJ JOINT/BURSA W/O US: CPT | Mod: PBBFAC | Performed by: PHYSICIAN ASSISTANT

## 2021-03-18 PROCEDURE — 99213 OFFICE O/P EST LOW 20 MIN: CPT | Mod: PBBFAC,25 | Performed by: ORTHOPAEDIC SURGERY

## 2021-03-18 PROCEDURE — 73564 X-RAY EXAM KNEE 4 OR MORE: CPT | Mod: TC,50

## 2021-03-18 PROCEDURE — 99214 OFFICE O/P EST MOD 30 MIN: CPT | Mod: S$PBB,25,, | Performed by: ORTHOPAEDIC SURGERY

## 2021-03-18 PROCEDURE — 73564 X-RAY EXAM KNEE 4 OR MORE: CPT | Mod: 26,50,, | Performed by: RADIOLOGY

## 2021-03-18 PROCEDURE — 20610 LARGE JOINT ASPIRATION/INJECTION: L KNEE: ICD-10-PCS | Mod: S$PBB,LT,, | Performed by: PHYSICIAN ASSISTANT

## 2021-03-18 PROCEDURE — 99999 PR PBB SHADOW E&M-EST. PATIENT-LVL III: ICD-10-PCS | Mod: PBBFAC,,, | Performed by: ORTHOPAEDIC SURGERY

## 2021-03-18 PROCEDURE — 20610 DRAIN/INJ JOINT/BURSA W/O US: CPT | Mod: S$PBB,LT,, | Performed by: PHYSICIAN ASSISTANT

## 2021-03-18 PROCEDURE — 73564 XR KNEE ORTHO BILAT WITH FLEXION: ICD-10-PCS | Mod: 26,50,, | Performed by: RADIOLOGY

## 2021-03-18 RX ORDER — ISOSORBIDE MONONITRATE 30 MG/1
TABLET, EXTENDED RELEASE ORAL
COMMUNITY
Start: 2021-02-26 | End: 2021-11-17

## 2021-03-18 RX ORDER — TRAMADOL HYDROCHLORIDE 50 MG/1
50 TABLET ORAL
COMMUNITY
Start: 2021-03-15 | End: 2021-11-17

## 2021-03-18 RX ORDER — METHYLPREDNISOLONE ACETATE 80 MG/ML
80 INJECTION, SUSPENSION INTRA-ARTICULAR; INTRALESIONAL; INTRAMUSCULAR; SOFT TISSUE
Status: DISCONTINUED | OUTPATIENT
Start: 2021-03-18 | End: 2021-03-18 | Stop reason: HOSPADM

## 2021-03-18 RX ADMIN — METHYLPREDNISOLONE ACETATE 80 MG: 80 INJECTION, SUSPENSION INTRALESIONAL; INTRAMUSCULAR; INTRASYNOVIAL; SOFT TISSUE at 10:03

## 2021-03-19 ENCOUNTER — PES CALL (OUTPATIENT)
Dept: ADMINISTRATIVE | Facility: CLINIC | Age: 81
End: 2021-03-19

## 2021-03-22 ENCOUNTER — HOSPITAL ENCOUNTER (OUTPATIENT)
Dept: RADIOLOGY | Facility: HOSPITAL | Age: 81
Discharge: HOME | End: 2021-03-22
Attending: PHYSICIAN ASSISTANT
Payer: MEDICARE

## 2021-03-22 DIAGNOSIS — S89.92XA INJURY OF LEFT KNEE, INITIAL ENCOUNTER: ICD-10-CM

## 2021-03-22 PROCEDURE — 73700 CT KNEE WITHOUT CONTRAST LEFT: ICD-10-PCS | Mod: 26,LT,, | Performed by: RADIOLOGY

## 2021-03-22 PROCEDURE — 73700 CT LOWER EXTREMITY W/O DYE: CPT | Mod: 26,LT,, | Performed by: RADIOLOGY

## 2021-03-22 PROCEDURE — 73700 CT LOWER EXTREMITY W/O DYE: CPT | Mod: TC,LT

## 2021-03-23 ENCOUNTER — TELEPHONE (OUTPATIENT)
Dept: ORTHOPEDICS | Facility: CLINIC | Age: 81
End: 2021-03-23

## 2021-03-25 ENCOUNTER — OFFICE VISIT (OUTPATIENT)
Dept: ORTHOPEDICS | Facility: CLINIC | Age: 81
End: 2021-03-25
Payer: MEDICARE

## 2021-03-25 DIAGNOSIS — S89.92XA INJURY OF LEFT KNEE, INITIAL ENCOUNTER: ICD-10-CM

## 2021-03-25 DIAGNOSIS — M25.462 EFFUSION OF LEFT KNEE: Primary | ICD-10-CM

## 2021-03-25 DIAGNOSIS — S82.192A OTHER CLOSED FRACTURE OF PROXIMAL END OF LEFT TIBIA, INITIAL ENCOUNTER: ICD-10-CM

## 2021-03-25 PROCEDURE — 99213 OFFICE O/P EST LOW 20 MIN: CPT | Mod: PBBFAC | Performed by: ORTHOPAEDIC SURGERY

## 2021-03-25 PROCEDURE — 99999 PR PBB SHADOW E&M-EST. PATIENT-LVL III: ICD-10-PCS | Mod: PBBFAC,,, | Performed by: ORTHOPAEDIC SURGERY

## 2021-03-25 PROCEDURE — 99999 PR PBB SHADOW E&M-EST. PATIENT-LVL III: CPT | Mod: PBBFAC,,, | Performed by: ORTHOPAEDIC SURGERY

## 2021-03-25 PROCEDURE — 99213 OFFICE O/P EST LOW 20 MIN: CPT | Mod: S$PBB,,, | Performed by: ORTHOPAEDIC SURGERY

## 2021-03-25 PROCEDURE — 99213 PR OFFICE/OUTPT VISIT, EST, LEVL III, 20-29 MIN: ICD-10-PCS | Mod: S$PBB,,, | Performed by: ORTHOPAEDIC SURGERY

## 2021-03-26 ENCOUNTER — TELEPHONE (OUTPATIENT)
Dept: ORTHOPEDICS | Facility: CLINIC | Age: 81
End: 2021-03-26

## 2021-03-31 ENCOUNTER — EXTERNAL CHRONIC CARE MANAGEMENT (OUTPATIENT)
Dept: PRIMARY CARE CLINIC | Facility: CLINIC | Age: 81
End: 2021-03-31
Payer: MEDICARE

## 2021-03-31 PROCEDURE — 99490 CHRNC CARE MGMT STAFF 1ST 20: CPT | Mod: S$PBB,,, | Performed by: FAMILY MEDICINE

## 2021-03-31 PROCEDURE — 99490 CHRNC CARE MGMT STAFF 1ST 20: CPT | Mod: PBBFAC | Performed by: FAMILY MEDICINE

## 2021-03-31 PROCEDURE — 99439 PR CHRONIC CARE MGMT, EA ADDTL 20 MIN: ICD-10-PCS | Mod: S$PBB,,, | Performed by: FAMILY MEDICINE

## 2021-03-31 PROCEDURE — 99490 PR CHRONIC CARE MGMT, 1ST 20 MIN: ICD-10-PCS | Mod: S$PBB,,, | Performed by: FAMILY MEDICINE

## 2021-03-31 PROCEDURE — 99439 CHRNC CARE MGMT STAF EA ADDL: CPT | Mod: PBBFAC,25,27 | Performed by: FAMILY MEDICINE

## 2021-03-31 PROCEDURE — 99439 CHRNC CARE MGMT STAF EA ADDL: CPT | Mod: S$PBB,,, | Performed by: FAMILY MEDICINE

## 2021-04-08 ENCOUNTER — OFFICE VISIT (OUTPATIENT)
Dept: ORTHOPEDICS | Facility: CLINIC | Age: 81
End: 2021-04-08
Payer: MEDICARE

## 2021-04-08 ENCOUNTER — DOCUMENTATION ONLY (OUTPATIENT)
Dept: ORTHOPEDICS | Facility: CLINIC | Age: 81
End: 2021-04-08

## 2021-04-08 DIAGNOSIS — M17.11 PRIMARY OSTEOARTHRITIS OF RIGHT KNEE: Primary | ICD-10-CM

## 2021-04-08 DIAGNOSIS — S82.192A OTHER CLOSED FRACTURE OF PROXIMAL END OF LEFT TIBIA, INITIAL ENCOUNTER: ICD-10-CM

## 2021-04-08 PROCEDURE — 20610 DRAIN/INJ JOINT/BURSA W/O US: CPT | Mod: PBBFAC | Performed by: ORTHOPAEDIC SURGERY

## 2021-04-08 PROCEDURE — 99999 PR PBB SHADOW E&M-EST. PATIENT-LVL III: CPT | Mod: PBBFAC,,, | Performed by: PHYSICIAN ASSISTANT

## 2021-04-08 PROCEDURE — 20610 LARGE JOINT ASPIRATION/INJECTION: R KNEE: ICD-10-PCS | Mod: S$PBB,RT,, | Performed by: ORTHOPAEDIC SURGERY

## 2021-04-08 PROCEDURE — 99213 PR OFFICE/OUTPT VISIT, EST, LEVL III, 20-29 MIN: ICD-10-PCS | Mod: 25,S$PBB,, | Performed by: PHYSICIAN ASSISTANT

## 2021-04-08 PROCEDURE — 99999 PR PBB SHADOW E&M-EST. PATIENT-LVL III: ICD-10-PCS | Mod: PBBFAC,,, | Performed by: PHYSICIAN ASSISTANT

## 2021-04-08 PROCEDURE — 99213 OFFICE O/P EST LOW 20 MIN: CPT | Mod: PBBFAC | Performed by: PHYSICIAN ASSISTANT

## 2021-04-08 PROCEDURE — 99213 OFFICE O/P EST LOW 20 MIN: CPT | Mod: 25,S$PBB,, | Performed by: PHYSICIAN ASSISTANT

## 2021-04-08 RX ADMIN — Medication 48 MG: at 09:04

## 2021-04-15 LAB
LEFT EYE DM RETINOPATHY: NEGATIVE
RIGHT EYE DM RETINOPATHY: NEGATIVE

## 2021-04-20 ENCOUNTER — PES CALL (OUTPATIENT)
Dept: ADMINISTRATIVE | Facility: CLINIC | Age: 81
End: 2021-04-20

## 2021-04-27 ENCOUNTER — TELEPHONE (OUTPATIENT)
Dept: ORTHOPEDICS | Facility: CLINIC | Age: 81
End: 2021-04-27

## 2021-04-30 ENCOUNTER — EXTERNAL CHRONIC CARE MANAGEMENT (OUTPATIENT)
Dept: PRIMARY CARE CLINIC | Facility: CLINIC | Age: 81
End: 2021-04-30
Payer: MEDICARE

## 2021-04-30 PROCEDURE — 99490 CHRNC CARE MGMT STAFF 1ST 20: CPT | Mod: S$PBB,,, | Performed by: FAMILY MEDICINE

## 2021-04-30 PROCEDURE — 99490 CHRNC CARE MGMT STAFF 1ST 20: CPT | Mod: PBBFAC | Performed by: FAMILY MEDICINE

## 2021-04-30 PROCEDURE — 99490 PR CHRONIC CARE MGMT, 1ST 20 MIN: ICD-10-PCS | Mod: S$PBB,,, | Performed by: FAMILY MEDICINE

## 2021-05-05 ENCOUNTER — OFFICE VISIT (OUTPATIENT)
Dept: ORTHOPEDICS | Facility: CLINIC | Age: 81
End: 2021-05-05
Payer: MEDICARE

## 2021-05-05 ENCOUNTER — HOSPITAL ENCOUNTER (OUTPATIENT)
Dept: RADIOLOGY | Facility: HOSPITAL | Age: 81
Discharge: HOME | End: 2021-05-05
Attending: ORTHOPAEDIC SURGERY
Payer: MEDICARE

## 2021-05-05 VITALS — BODY MASS INDEX: 25.68 KG/M2 | HEIGHT: 61 IN | WEIGHT: 136 LBS

## 2021-05-05 DIAGNOSIS — S82.192A OTHER CLOSED FRACTURE OF PROXIMAL END OF LEFT TIBIA, INITIAL ENCOUNTER: ICD-10-CM

## 2021-05-05 DIAGNOSIS — M25.462 EFFUSION OF LEFT KNEE: ICD-10-CM

## 2021-05-05 DIAGNOSIS — S82.192D OTHER CLOSED FRACTURE OF PROXIMAL END OF LEFT TIBIA WITH ROUTINE HEALING, SUBSEQUENT ENCOUNTER: ICD-10-CM

## 2021-05-05 DIAGNOSIS — S89.92XA INJURY OF LEFT KNEE, INITIAL ENCOUNTER: ICD-10-CM

## 2021-05-05 DIAGNOSIS — M17.11 PRIMARY OSTEOARTHRITIS OF RIGHT KNEE: Primary | ICD-10-CM

## 2021-05-05 PROCEDURE — 99999 PR PBB SHADOW E&M-EST. PATIENT-LVL III: ICD-10-PCS | Mod: PBBFAC,,, | Performed by: PHYSICIAN ASSISTANT

## 2021-05-05 PROCEDURE — 99213 PR OFFICE/OUTPT VISIT, EST, LEVL III, 20-29 MIN: ICD-10-PCS | Mod: S$PBB,,, | Performed by: PHYSICIAN ASSISTANT

## 2021-05-05 PROCEDURE — 73562 XR KNEE ORTHO LEFT WITH FLEXION: ICD-10-PCS | Mod: 26,RT,, | Performed by: RADIOLOGY

## 2021-05-05 PROCEDURE — 99213 OFFICE O/P EST LOW 20 MIN: CPT | Mod: PBBFAC,25 | Performed by: PHYSICIAN ASSISTANT

## 2021-05-05 PROCEDURE — 73564 X-RAY EXAM KNEE 4 OR MORE: CPT | Mod: 26,LT,, | Performed by: RADIOLOGY

## 2021-05-05 PROCEDURE — 73562 X-RAY EXAM OF KNEE 3: CPT | Mod: 26,RT,, | Performed by: RADIOLOGY

## 2021-05-05 PROCEDURE — 73564 X-RAY EXAM KNEE 4 OR MORE: CPT | Mod: TC,LT,59

## 2021-05-05 PROCEDURE — 99213 OFFICE O/P EST LOW 20 MIN: CPT | Mod: S$PBB,,, | Performed by: PHYSICIAN ASSISTANT

## 2021-05-05 PROCEDURE — 73564 XR KNEE ORTHO LEFT WITH FLEXION: ICD-10-PCS | Mod: 26,LT,, | Performed by: RADIOLOGY

## 2021-05-05 PROCEDURE — 99999 PR PBB SHADOW E&M-EST. PATIENT-LVL III: CPT | Mod: PBBFAC,,, | Performed by: PHYSICIAN ASSISTANT

## 2021-05-05 RX ORDER — DICLOFENAC SODIUM 10 MG/G
2 GEL TOPICAL 3 TIMES DAILY
Qty: 1 TUBE | Refills: 2 | Status: SHIPPED | OUTPATIENT
Start: 2021-05-05 | End: 2022-05-25

## 2021-05-18 ENCOUNTER — OFFICE VISIT (OUTPATIENT)
Dept: CARDIOLOGY | Facility: CLINIC | Age: 81
End: 2021-05-18
Payer: MEDICARE

## 2021-05-18 VITALS
RESPIRATION RATE: 16 BRPM | DIASTOLIC BLOOD PRESSURE: 74 MMHG | BODY MASS INDEX: 25.64 KG/M2 | OXYGEN SATURATION: 97 % | HEIGHT: 61 IN | WEIGHT: 135.81 LBS | SYSTOLIC BLOOD PRESSURE: 172 MMHG | HEART RATE: 66 BPM

## 2021-05-18 DIAGNOSIS — H91.91 HEARING LOSS OF RIGHT EAR, UNSPECIFIED HEARING LOSS TYPE: Primary | ICD-10-CM

## 2021-05-18 DIAGNOSIS — I10 ESSENTIAL HYPERTENSION: ICD-10-CM

## 2021-05-18 PROCEDURE — 99213 OFFICE O/P EST LOW 20 MIN: CPT | Mod: S$PBB,,, | Performed by: INTERNAL MEDICINE

## 2021-05-18 PROCEDURE — 99213 PR OFFICE/OUTPT VISIT, EST, LEVL III, 20-29 MIN: ICD-10-PCS | Mod: S$PBB,,, | Performed by: INTERNAL MEDICINE

## 2021-05-18 PROCEDURE — 99999 PR PBB SHADOW E&M-EST. PATIENT-LVL IV: CPT | Mod: PBBFAC,,, | Performed by: INTERNAL MEDICINE

## 2021-05-18 PROCEDURE — 99214 OFFICE O/P EST MOD 30 MIN: CPT | Mod: PBBFAC | Performed by: INTERNAL MEDICINE

## 2021-05-18 PROCEDURE — 99999 PR PBB SHADOW E&M-EST. PATIENT-LVL IV: ICD-10-PCS | Mod: PBBFAC,,, | Performed by: INTERNAL MEDICINE

## 2021-05-18 RX ORDER — CLONIDINE HYDROCHLORIDE 0.1 MG/1
0.1 TABLET ORAL 2 TIMES DAILY
Qty: 60 TABLET | Refills: 11 | Status: SHIPPED | OUTPATIENT
Start: 2021-05-18 | End: 2021-11-23

## 2021-05-31 ENCOUNTER — EXTERNAL CHRONIC CARE MANAGEMENT (OUTPATIENT)
Dept: PRIMARY CARE CLINIC | Facility: CLINIC | Age: 81
End: 2021-05-31
Payer: MEDICARE

## 2021-05-31 PROCEDURE — 99490 PR CHRONIC CARE MGMT, 1ST 20 MIN: ICD-10-PCS | Mod: S$PBB,,, | Performed by: FAMILY MEDICINE

## 2021-05-31 PROCEDURE — 99490 CHRNC CARE MGMT STAFF 1ST 20: CPT | Mod: S$PBB,,, | Performed by: FAMILY MEDICINE

## 2021-05-31 PROCEDURE — 99490 CHRNC CARE MGMT STAFF 1ST 20: CPT | Mod: PBBFAC | Performed by: FAMILY MEDICINE

## 2021-06-24 ENCOUNTER — OFFICE VISIT (OUTPATIENT)
Dept: ORTHOPEDICS | Facility: CLINIC | Age: 81
End: 2021-06-24
Payer: MEDICARE

## 2021-06-24 VITALS — BODY MASS INDEX: 25.49 KG/M2 | WEIGHT: 135 LBS | HEIGHT: 61 IN

## 2021-06-24 DIAGNOSIS — M17.11 PRIMARY OSTEOARTHRITIS OF RIGHT KNEE: Primary | ICD-10-CM

## 2021-06-24 PROCEDURE — 99213 OFFICE O/P EST LOW 20 MIN: CPT | Mod: PBBFAC | Performed by: PHYSICIAN ASSISTANT

## 2021-06-24 PROCEDURE — 99999 PR PBB SHADOW E&M-EST. PATIENT-LVL III: CPT | Mod: PBBFAC,,, | Performed by: PHYSICIAN ASSISTANT

## 2021-06-24 PROCEDURE — 99999 PR PBB SHADOW E&M-EST. PATIENT-LVL III: ICD-10-PCS | Mod: PBBFAC,,, | Performed by: PHYSICIAN ASSISTANT

## 2021-06-24 PROCEDURE — 99213 OFFICE O/P EST LOW 20 MIN: CPT | Mod: S$PBB,,, | Performed by: PHYSICIAN ASSISTANT

## 2021-06-24 PROCEDURE — 99213 PR OFFICE/OUTPT VISIT, EST, LEVL III, 20-29 MIN: ICD-10-PCS | Mod: S$PBB,,, | Performed by: PHYSICIAN ASSISTANT

## 2021-06-30 ENCOUNTER — TELEPHONE (OUTPATIENT)
Dept: FAMILY MEDICINE | Facility: CLINIC | Age: 81
End: 2021-06-30

## 2021-06-30 ENCOUNTER — EXTERNAL CHRONIC CARE MANAGEMENT (OUTPATIENT)
Dept: PRIMARY CARE CLINIC | Facility: CLINIC | Age: 81
End: 2021-06-30
Payer: MEDICARE

## 2021-06-30 PROCEDURE — 99490 PR CHRONIC CARE MGMT, 1ST 20 MIN: ICD-10-PCS | Mod: S$PBB,,, | Performed by: FAMILY MEDICINE

## 2021-06-30 PROCEDURE — 99490 CHRNC CARE MGMT STAFF 1ST 20: CPT | Mod: S$PBB,,, | Performed by: FAMILY MEDICINE

## 2021-06-30 PROCEDURE — 99490 CHRNC CARE MGMT STAFF 1ST 20: CPT | Mod: PBBFAC | Performed by: FAMILY MEDICINE

## 2021-07-22 ENCOUNTER — TELEPHONE (OUTPATIENT)
Dept: INTERNAL MEDICINE | Facility: CLINIC | Age: 81
End: 2021-07-22

## 2021-07-23 PROBLEM — I70.0 ATHEROSCLEROSIS OF AORTA: Status: ACTIVE | Noted: 2021-07-23

## 2021-07-26 DIAGNOSIS — F51.01 PRIMARY INSOMNIA: ICD-10-CM

## 2021-07-27 RX ORDER — TRAZODONE HYDROCHLORIDE 50 MG/1
TABLET ORAL
Qty: 90 TABLET | Refills: 2 | Status: SHIPPED | OUTPATIENT
Start: 2021-07-27 | End: 2021-11-17

## 2021-07-31 ENCOUNTER — EXTERNAL CHRONIC CARE MANAGEMENT (OUTPATIENT)
Dept: PRIMARY CARE CLINIC | Facility: CLINIC | Age: 81
End: 2021-07-31
Payer: MEDICARE

## 2021-07-31 PROCEDURE — 99490 CHRNC CARE MGMT STAFF 1ST 20: CPT | Mod: S$PBB,,, | Performed by: FAMILY MEDICINE

## 2021-07-31 PROCEDURE — 99490 PR CHRONIC CARE MGMT, 1ST 20 MIN: ICD-10-PCS | Mod: S$PBB,,, | Performed by: FAMILY MEDICINE

## 2021-07-31 PROCEDURE — 99490 CHRNC CARE MGMT STAFF 1ST 20: CPT | Mod: PBBFAC | Performed by: FAMILY MEDICINE

## 2021-08-31 ENCOUNTER — EXTERNAL CHRONIC CARE MANAGEMENT (OUTPATIENT)
Dept: PRIMARY CARE CLINIC | Facility: CLINIC | Age: 81
End: 2021-08-31
Payer: MEDICARE

## 2021-08-31 PROCEDURE — 99490 CHRNC CARE MGMT STAFF 1ST 20: CPT | Mod: S$PBB,,, | Performed by: FAMILY MEDICINE

## 2021-08-31 PROCEDURE — 99490 PR CHRONIC CARE MGMT, 1ST 20 MIN: ICD-10-PCS | Mod: S$PBB,,, | Performed by: FAMILY MEDICINE

## 2021-08-31 PROCEDURE — 99490 CHRNC CARE MGMT STAFF 1ST 20: CPT | Mod: PBBFAC | Performed by: FAMILY MEDICINE

## 2021-09-30 ENCOUNTER — EXTERNAL CHRONIC CARE MANAGEMENT (OUTPATIENT)
Dept: PRIMARY CARE CLINIC | Facility: CLINIC | Age: 81
End: 2021-09-30
Payer: MEDICARE

## 2021-09-30 PROCEDURE — 99490 CHRNC CARE MGMT STAFF 1ST 20: CPT | Mod: S$PBB,,, | Performed by: FAMILY MEDICINE

## 2021-09-30 PROCEDURE — 99490 CHRNC CARE MGMT STAFF 1ST 20: CPT | Mod: PBBFAC | Performed by: FAMILY MEDICINE

## 2021-09-30 PROCEDURE — 99490 PR CHRONIC CARE MGMT, 1ST 20 MIN: ICD-10-PCS | Mod: S$PBB,,, | Performed by: FAMILY MEDICINE

## 2021-10-01 ENCOUNTER — PES CALL (OUTPATIENT)
Dept: ADMINISTRATIVE | Facility: CLINIC | Age: 81
End: 2021-10-01

## 2021-10-14 ENCOUNTER — OFFICE VISIT (OUTPATIENT)
Dept: INTERNAL MEDICINE | Facility: CLINIC | Age: 81
End: 2021-10-14
Payer: MEDICARE

## 2021-10-14 ENCOUNTER — HOSPITAL ENCOUNTER (OUTPATIENT)
Dept: RADIOLOGY | Facility: HOSPITAL | Age: 81
Discharge: HOME | End: 2021-10-14
Attending: PHYSICIAN ASSISTANT
Payer: MEDICARE

## 2021-10-14 VITALS
BODY MASS INDEX: 25.51 KG/M2 | DIASTOLIC BLOOD PRESSURE: 62 MMHG | TEMPERATURE: 98 F | WEIGHT: 135.13 LBS | RESPIRATION RATE: 17 BRPM | OXYGEN SATURATION: 97 % | HEART RATE: 66 BPM | HEIGHT: 61 IN | SYSTOLIC BLOOD PRESSURE: 98 MMHG

## 2021-10-14 DIAGNOSIS — K59.00 CONSTIPATION, UNSPECIFIED CONSTIPATION TYPE: ICD-10-CM

## 2021-10-14 DIAGNOSIS — E78.5 HYPERLIPIDEMIA LDL GOAL <100: ICD-10-CM

## 2021-10-14 DIAGNOSIS — E55.9 VITAMIN D DEFICIENCY: ICD-10-CM

## 2021-10-14 DIAGNOSIS — R73.03 PREDIABETES: ICD-10-CM

## 2021-10-14 DIAGNOSIS — I10 ESSENTIAL HYPERTENSION: Primary | ICD-10-CM

## 2021-10-14 PROCEDURE — 74019 XR ABDOMEN FLAT AND ERECT: ICD-10-PCS | Mod: 26,,, | Performed by: RADIOLOGY

## 2021-10-14 PROCEDURE — 74019 RADEX ABDOMEN 2 VIEWS: CPT | Mod: TC

## 2021-10-14 PROCEDURE — G0008 ADMIN INFLUENZA VIRUS VAC: HCPCS | Mod: PBBFAC

## 2021-10-14 PROCEDURE — 99999 PR PBB SHADOW E&M-EST. PATIENT-LVL IV: CPT | Mod: PBBFAC,,, | Performed by: PHYSICIAN ASSISTANT

## 2021-10-14 PROCEDURE — 99999 PR PBB SHADOW E&M-EST. PATIENT-LVL IV: ICD-10-PCS | Mod: PBBFAC,,, | Performed by: PHYSICIAN ASSISTANT

## 2021-10-14 PROCEDURE — 90694 VACC AIIV4 NO PRSRV 0.5ML IM: CPT | Mod: PBBFAC

## 2021-10-14 PROCEDURE — 74019 RADEX ABDOMEN 2 VIEWS: CPT | Mod: 26,,, | Performed by: RADIOLOGY

## 2021-10-14 PROCEDURE — 99214 OFFICE O/P EST MOD 30 MIN: CPT | Mod: S$PBB,,, | Performed by: PHYSICIAN ASSISTANT

## 2021-10-14 PROCEDURE — 99214 PR OFFICE/OUTPT VISIT, EST, LEVL IV, 30-39 MIN: ICD-10-PCS | Mod: S$PBB,,, | Performed by: PHYSICIAN ASSISTANT

## 2021-10-14 PROCEDURE — 99214 OFFICE O/P EST MOD 30 MIN: CPT | Mod: PBBFAC,25 | Performed by: PHYSICIAN ASSISTANT

## 2021-10-31 ENCOUNTER — EXTERNAL CHRONIC CARE MANAGEMENT (OUTPATIENT)
Dept: PRIMARY CARE CLINIC | Facility: CLINIC | Age: 81
End: 2021-10-31
Payer: MEDICARE

## 2021-10-31 PROCEDURE — 99490 PR CHRONIC CARE MGMT, 1ST 20 MIN: ICD-10-PCS | Mod: S$PBB,,, | Performed by: FAMILY MEDICINE

## 2021-10-31 PROCEDURE — 99490 CHRNC CARE MGMT STAFF 1ST 20: CPT | Mod: S$PBB,,, | Performed by: FAMILY MEDICINE

## 2021-10-31 PROCEDURE — 99439 CHRNC CARE MGMT STAF EA ADDL: CPT | Mod: PBBFAC | Performed by: FAMILY MEDICINE

## 2021-10-31 PROCEDURE — 99439 CHRNC CARE MGMT STAF EA ADDL: CPT | Mod: S$PBB,,, | Performed by: FAMILY MEDICINE

## 2021-10-31 PROCEDURE — 99439 PR CHRONIC CARE MGMT, EA ADDTL 20 MIN: ICD-10-PCS | Mod: S$PBB,,, | Performed by: FAMILY MEDICINE

## 2021-10-31 PROCEDURE — 99490 CHRNC CARE MGMT STAFF 1ST 20: CPT | Mod: PBBFAC,25 | Performed by: FAMILY MEDICINE

## 2021-11-09 ENCOUNTER — TELEPHONE (OUTPATIENT)
Dept: ORTHOPEDICS | Facility: CLINIC | Age: 81
End: 2021-11-09
Payer: MEDICARE

## 2021-11-09 ENCOUNTER — PATIENT OUTREACH (OUTPATIENT)
Dept: ADMINISTRATIVE | Facility: HOSPITAL | Age: 81
End: 2021-11-09
Payer: MEDICARE

## 2021-11-10 ENCOUNTER — LAB VISIT (OUTPATIENT)
Dept: LAB | Facility: HOSPITAL | Age: 81
End: 2021-11-10
Attending: FAMILY MEDICINE
Payer: MEDICARE

## 2021-11-10 DIAGNOSIS — E78.5 HYPERLIPIDEMIA LDL GOAL <100: ICD-10-CM

## 2021-11-10 DIAGNOSIS — R73.03 PREDIABETES: ICD-10-CM

## 2021-11-10 DIAGNOSIS — I10 ESSENTIAL HYPERTENSION: ICD-10-CM

## 2021-11-10 DIAGNOSIS — E55.9 VITAMIN D DEFICIENCY: ICD-10-CM

## 2021-11-10 LAB
25(OH)D3+25(OH)D2 SERPL-MCNC: 37 NG/ML (ref 30–96)
ALBUMIN SERPL BCP-MCNC: 3.6 G/DL (ref 3.5–5.2)
ALBUMIN/CREAT UR: 51.9 UG/MG (ref 0–30)
ALP SERPL-CCNC: 65 U/L (ref 55–135)
ALT SERPL W/O P-5'-P-CCNC: 19 U/L (ref 10–44)
ANION GAP SERPL CALC-SCNC: 8 MMOL/L (ref 8–16)
AST SERPL-CCNC: 19 U/L (ref 10–40)
BASOPHILS # BLD AUTO: 0.04 K/UL (ref 0–0.2)
BASOPHILS NFR BLD: 0.6 % (ref 0–1.9)
BILIRUB SERPL-MCNC: 0.3 MG/DL (ref 0.1–1)
BUN SERPL-MCNC: 21 MG/DL (ref 8–23)
CALCIUM SERPL-MCNC: 9.9 MG/DL (ref 8.7–10.5)
CHLORIDE SERPL-SCNC: 109 MMOL/L (ref 95–110)
CHOLEST SERPL-MCNC: 166 MG/DL (ref 120–199)
CHOLEST/HDLC SERPL: 4 {RATIO} (ref 2–5)
CO2 SERPL-SCNC: 28 MMOL/L (ref 23–29)
CREAT SERPL-MCNC: 0.8 MG/DL (ref 0.5–1.4)
CREAT UR-MCNC: 131 MG/DL (ref 15–325)
DIFFERENTIAL METHOD: ABNORMAL
EOSINOPHIL # BLD AUTO: 0.1 K/UL (ref 0–0.5)
EOSINOPHIL NFR BLD: 2.3 % (ref 0–8)
ERYTHROCYTE [DISTWIDTH] IN BLOOD BY AUTOMATED COUNT: 14 % (ref 11.5–14.5)
EST. GFR  (AFRICAN AMERICAN): >60 ML/MIN/1.73 M^2
EST. GFR  (NON AFRICAN AMERICAN): >60 ML/MIN/1.73 M^2
ESTIMATED AVG GLUCOSE: 105 MG/DL (ref 68–131)
GLUCOSE SERPL-MCNC: 95 MG/DL (ref 70–110)
HBA1C MFR BLD: 5.3 % (ref 4–5.6)
HCT VFR BLD AUTO: 41.7 % (ref 37–48.5)
HDLC SERPL-MCNC: 41 MG/DL (ref 40–75)
HDLC SERPL: 24.7 % (ref 20–50)
HGB BLD-MCNC: 13.1 G/DL (ref 12–16)
IMM GRANULOCYTES # BLD AUTO: 0.02 K/UL (ref 0–0.04)
IMM GRANULOCYTES NFR BLD AUTO: 0.3 % (ref 0–0.5)
LDLC SERPL CALC-MCNC: 102 MG/DL (ref 63–159)
LYMPHOCYTES # BLD AUTO: 2.1 K/UL (ref 1–4.8)
LYMPHOCYTES NFR BLD: 33.8 % (ref 18–48)
MCH RBC QN AUTO: 29.4 PG (ref 27–31)
MCHC RBC AUTO-ENTMCNC: 31.4 G/DL (ref 32–36)
MCV RBC AUTO: 94 FL (ref 82–98)
MICROALBUMIN UR DL<=1MG/L-MCNC: 68 UG/ML
MONOCYTES # BLD AUTO: 0.7 K/UL (ref 0.3–1)
MONOCYTES NFR BLD: 11.7 % (ref 4–15)
NEUTROPHILS # BLD AUTO: 3.2 K/UL (ref 1.8–7.7)
NEUTROPHILS NFR BLD: 51.3 % (ref 38–73)
NONHDLC SERPL-MCNC: 125 MG/DL
NRBC BLD-RTO: 0 /100 WBC
PLATELET # BLD AUTO: 220 K/UL (ref 150–450)
PMV BLD AUTO: 9.6 FL (ref 9.2–12.9)
POTASSIUM SERPL-SCNC: 4.5 MMOL/L (ref 3.5–5.1)
PROT SERPL-MCNC: 6.5 G/DL (ref 6–8.4)
RBC # BLD AUTO: 4.46 M/UL (ref 4–5.4)
SODIUM SERPL-SCNC: 145 MMOL/L (ref 136–145)
TRIGL SERPL-MCNC: 115 MG/DL (ref 30–150)
WBC # BLD AUTO: 6.22 K/UL (ref 3.9–12.7)

## 2021-11-10 PROCEDURE — 80061 LIPID PANEL: CPT | Performed by: PHYSICIAN ASSISTANT

## 2021-11-10 PROCEDURE — 82570 ASSAY OF URINE CREATININE: CPT | Performed by: PHYSICIAN ASSISTANT

## 2021-11-10 PROCEDURE — 82306 VITAMIN D 25 HYDROXY: CPT | Performed by: PHYSICIAN ASSISTANT

## 2021-11-10 PROCEDURE — 83036 HEMOGLOBIN GLYCOSYLATED A1C: CPT | Performed by: PHYSICIAN ASSISTANT

## 2021-11-10 PROCEDURE — 80053 COMPREHEN METABOLIC PANEL: CPT | Performed by: PHYSICIAN ASSISTANT

## 2021-11-10 PROCEDURE — 85025 COMPLETE CBC W/AUTO DIFF WBC: CPT | Performed by: PHYSICIAN ASSISTANT

## 2021-11-10 PROCEDURE — 36415 COLL VENOUS BLD VENIPUNCTURE: CPT | Performed by: PHYSICIAN ASSISTANT

## 2021-11-15 ENCOUNTER — PATIENT OUTREACH (OUTPATIENT)
Dept: ADMINISTRATIVE | Facility: OTHER | Age: 81
End: 2021-11-15
Payer: MEDICARE

## 2021-11-16 ENCOUNTER — OFFICE VISIT (OUTPATIENT)
Dept: ORTHOPEDICS | Facility: CLINIC | Age: 81
End: 2021-11-16
Payer: MEDICARE

## 2021-11-16 VITALS
BODY MASS INDEX: 25.49 KG/M2 | DIASTOLIC BLOOD PRESSURE: 84 MMHG | HEIGHT: 61 IN | SYSTOLIC BLOOD PRESSURE: 198 MMHG | WEIGHT: 135 LBS

## 2021-11-16 DIAGNOSIS — M25.561 RIGHT KNEE PAIN, UNSPECIFIED CHRONICITY: ICD-10-CM

## 2021-11-16 DIAGNOSIS — M17.11 PRIMARY OSTEOARTHRITIS OF RIGHT KNEE: Primary | ICD-10-CM

## 2021-11-16 PROCEDURE — 99999 PR PBB SHADOW E&M-EST. PATIENT-LVL III: CPT | Mod: PBBFAC,,, | Performed by: PHYSICIAN ASSISTANT

## 2021-11-16 PROCEDURE — 99213 OFFICE O/P EST LOW 20 MIN: CPT | Mod: PBBFAC | Performed by: PHYSICIAN ASSISTANT

## 2021-11-16 PROCEDURE — 99213 PR OFFICE/OUTPT VISIT, EST, LEVL III, 20-29 MIN: ICD-10-PCS | Mod: S$PBB,,, | Performed by: PHYSICIAN ASSISTANT

## 2021-11-16 PROCEDURE — 99213 OFFICE O/P EST LOW 20 MIN: CPT | Mod: S$PBB,,, | Performed by: PHYSICIAN ASSISTANT

## 2021-11-16 PROCEDURE — 99999 PR PBB SHADOW E&M-EST. PATIENT-LVL III: ICD-10-PCS | Mod: PBBFAC,,, | Performed by: PHYSICIAN ASSISTANT

## 2021-11-17 ENCOUNTER — OFFICE VISIT (OUTPATIENT)
Dept: INTERNAL MEDICINE | Facility: CLINIC | Age: 81
End: 2021-11-17
Payer: MEDICARE

## 2021-11-17 VITALS
SYSTOLIC BLOOD PRESSURE: 120 MMHG | HEART RATE: 66 BPM | DIASTOLIC BLOOD PRESSURE: 64 MMHG | RESPIRATION RATE: 18 BRPM | TEMPERATURE: 98 F | BODY MASS INDEX: 26.19 KG/M2 | HEIGHT: 61 IN | WEIGHT: 138.69 LBS | OXYGEN SATURATION: 96 %

## 2021-11-17 DIAGNOSIS — Z12.31 SCREENING MAMMOGRAM FOR BREAST CANCER: ICD-10-CM

## 2021-11-17 DIAGNOSIS — F51.01 PRIMARY INSOMNIA: ICD-10-CM

## 2021-11-17 DIAGNOSIS — Z00.00 ROUTINE PHYSICAL EXAMINATION: Primary | ICD-10-CM

## 2021-11-17 DIAGNOSIS — I70.0 ATHEROSCLEROSIS OF AORTA: ICD-10-CM

## 2021-11-17 DIAGNOSIS — E11.9 TYPE 2 DIABETES MELLITUS WITHOUT COMPLICATION, WITHOUT LONG-TERM CURRENT USE OF INSULIN: ICD-10-CM

## 2021-11-17 PROCEDURE — 99999 PR PBB SHADOW E&M-EST. PATIENT-LVL IV: CPT | Mod: PBBFAC,,, | Performed by: FAMILY MEDICINE

## 2021-11-17 PROCEDURE — 99999 PR PBB SHADOW E&M-EST. PATIENT-LVL IV: ICD-10-PCS | Mod: PBBFAC,,, | Performed by: FAMILY MEDICINE

## 2021-11-17 PROCEDURE — 99214 OFFICE O/P EST MOD 30 MIN: CPT | Mod: PBBFAC | Performed by: FAMILY MEDICINE

## 2021-11-17 PROCEDURE — 99214 OFFICE O/P EST MOD 30 MIN: CPT | Mod: S$PBB,,, | Performed by: FAMILY MEDICINE

## 2021-11-17 PROCEDURE — 99214 PR OFFICE/OUTPT VISIT, EST, LEVL IV, 30-39 MIN: ICD-10-PCS | Mod: S$PBB,,, | Performed by: FAMILY MEDICINE

## 2021-11-17 RX ORDER — TRAZODONE HYDROCHLORIDE 50 MG/1
TABLET ORAL
Qty: 90 TABLET | Refills: 2 | Status: SHIPPED | OUTPATIENT
Start: 2021-11-17 | End: 2022-06-08

## 2021-11-18 DIAGNOSIS — M25.561 RIGHT KNEE PAIN, UNSPECIFIED CHRONICITY: ICD-10-CM

## 2021-11-18 DIAGNOSIS — M17.11 PRIMARY OSTEOARTHRITIS OF RIGHT KNEE: Primary | ICD-10-CM

## 2021-11-23 ENCOUNTER — OFFICE VISIT (OUTPATIENT)
Dept: CARDIOLOGY | Facility: CLINIC | Age: 81
End: 2021-11-23
Payer: MEDICARE

## 2021-11-23 VITALS
HEART RATE: 56 BPM | SYSTOLIC BLOOD PRESSURE: 112 MMHG | OXYGEN SATURATION: 98 % | BODY MASS INDEX: 26.22 KG/M2 | WEIGHT: 138.88 LBS | HEIGHT: 61 IN | DIASTOLIC BLOOD PRESSURE: 60 MMHG

## 2021-11-23 DIAGNOSIS — I10 ESSENTIAL HYPERTENSION: ICD-10-CM

## 2021-11-23 PROCEDURE — 99213 OFFICE O/P EST LOW 20 MIN: CPT | Mod: PBBFAC | Performed by: INTERNAL MEDICINE

## 2021-11-23 PROCEDURE — 99213 OFFICE O/P EST LOW 20 MIN: CPT | Mod: S$PBB,,, | Performed by: INTERNAL MEDICINE

## 2021-11-23 PROCEDURE — 99999 PR PBB SHADOW E&M-EST. PATIENT-LVL III: ICD-10-PCS | Mod: PBBFAC,,, | Performed by: INTERNAL MEDICINE

## 2021-11-23 PROCEDURE — 99213 PR OFFICE/OUTPT VISIT, EST, LEVL III, 20-29 MIN: ICD-10-PCS | Mod: S$PBB,,, | Performed by: INTERNAL MEDICINE

## 2021-11-23 PROCEDURE — 99999 PR PBB SHADOW E&M-EST. PATIENT-LVL III: CPT | Mod: PBBFAC,,, | Performed by: INTERNAL MEDICINE

## 2021-11-23 RX ORDER — CLONIDINE HYDROCHLORIDE 0.1 MG/1
0.1 TABLET ORAL 2 TIMES DAILY
Qty: 90 TABLET | Refills: 11 | Status: SHIPPED | OUTPATIENT
Start: 2021-11-23 | End: 2022-04-14 | Stop reason: SDUPTHER

## 2021-11-23 RX ORDER — CARVEDILOL 6.25 MG/1
6.25 TABLET ORAL 2 TIMES DAILY
Qty: 180 TABLET | Refills: 3 | Status: CANCELLED | OUTPATIENT
Start: 2021-11-23 | End: 2022-02-21

## 2021-11-23 RX ORDER — CARVEDILOL 6.25 MG/1
6.25 TABLET ORAL 2 TIMES DAILY
Qty: 180 TABLET | Refills: 3 | Status: ON HOLD | OUTPATIENT
Start: 2021-11-23 | End: 2022-05-21 | Stop reason: HOSPADM

## 2021-11-30 ENCOUNTER — EXTERNAL CHRONIC CARE MANAGEMENT (OUTPATIENT)
Dept: PRIMARY CARE CLINIC | Facility: CLINIC | Age: 81
End: 2021-11-30
Payer: MEDICARE

## 2021-11-30 PROCEDURE — 99490 CHRNC CARE MGMT STAFF 1ST 20: CPT | Mod: S$PBB,,, | Performed by: FAMILY MEDICINE

## 2021-11-30 PROCEDURE — 99490 PR CHRONIC CARE MGMT, 1ST 20 MIN: ICD-10-PCS | Mod: S$PBB,,, | Performed by: FAMILY MEDICINE

## 2021-11-30 PROCEDURE — 99490 CHRNC CARE MGMT STAFF 1ST 20: CPT | Mod: PBBFAC,25 | Performed by: FAMILY MEDICINE

## 2021-11-30 PROCEDURE — 99439 CHRNC CARE MGMT STAF EA ADDL: CPT | Mod: S$PBB,,, | Performed by: FAMILY MEDICINE

## 2021-11-30 PROCEDURE — 99439 CHRNC CARE MGMT STAF EA ADDL: CPT | Mod: PBBFAC,25,27 | Performed by: FAMILY MEDICINE

## 2021-11-30 PROCEDURE — 99439 PR CHRONIC CARE MGMT, EA ADDTL 20 MIN: ICD-10-PCS | Mod: S$PBB,,, | Performed by: FAMILY MEDICINE

## 2021-12-06 ENCOUNTER — TELEPHONE (OUTPATIENT)
Dept: INTERNAL MEDICINE | Facility: CLINIC | Age: 81
End: 2021-12-06
Payer: MEDICARE

## 2021-12-06 ENCOUNTER — HOSPITAL ENCOUNTER (EMERGENCY)
Facility: HOSPITAL | Age: 81
Discharge: HOME | End: 2021-12-06
Attending: EMERGENCY MEDICINE
Payer: MEDICARE

## 2021-12-06 VITALS
WEIGHT: 135 LBS | HEIGHT: 61 IN | BODY MASS INDEX: 25.49 KG/M2 | SYSTOLIC BLOOD PRESSURE: 159 MMHG | OXYGEN SATURATION: 98 % | DIASTOLIC BLOOD PRESSURE: 76 MMHG | TEMPERATURE: 97 F | RESPIRATION RATE: 19 BRPM | HEART RATE: 56 BPM

## 2021-12-06 DIAGNOSIS — R53.1 GENERALIZED WEAKNESS: Primary | ICD-10-CM

## 2021-12-06 DIAGNOSIS — R00.1 BRADYCARDIA: ICD-10-CM

## 2021-12-06 DIAGNOSIS — I10 ESSENTIAL HYPERTENSION: ICD-10-CM

## 2021-12-06 LAB
ALBUMIN SERPL BCP-MCNC: 3.6 G/DL (ref 3.5–5.2)
ALP SERPL-CCNC: 49 U/L (ref 55–135)
ALT SERPL W/O P-5'-P-CCNC: 11 U/L (ref 10–44)
ANION GAP SERPL CALC-SCNC: 7 MMOL/L (ref 8–16)
AST SERPL-CCNC: 15 U/L (ref 10–40)
BACTERIA #/AREA URNS HPF: NORMAL /HPF
BASOPHILS # BLD AUTO: 0.03 K/UL (ref 0–0.2)
BASOPHILS NFR BLD: 0.6 % (ref 0–1.9)
BILIRUB SERPL-MCNC: 0.5 MG/DL (ref 0.1–1)
BILIRUB UR QL STRIP: NEGATIVE
BUN SERPL-MCNC: 16 MG/DL (ref 8–23)
CALCIUM SERPL-MCNC: 9.5 MG/DL (ref 8.7–10.5)
CHLORIDE SERPL-SCNC: 109 MMOL/L (ref 95–110)
CLARITY UR: CLEAR
CO2 SERPL-SCNC: 26 MMOL/L (ref 23–29)
COLOR UR: YELLOW
CREAT SERPL-MCNC: 0.8 MG/DL (ref 0.5–1.4)
DIFFERENTIAL METHOD: ABNORMAL
EOSINOPHIL # BLD AUTO: 0.1 K/UL (ref 0–0.5)
EOSINOPHIL NFR BLD: 2.5 % (ref 0–8)
ERYTHROCYTE [DISTWIDTH] IN BLOOD BY AUTOMATED COUNT: 13.6 % (ref 11.5–14.5)
EST. GFR  (AFRICAN AMERICAN): >60 ML/MIN/1.73 M^2
EST. GFR  (NON AFRICAN AMERICAN): >60 ML/MIN/1.73 M^2
GLUCOSE SERPL-MCNC: 105 MG/DL (ref 70–110)
GLUCOSE UR QL STRIP: NEGATIVE
HCT VFR BLD AUTO: 40.6 % (ref 37–48.5)
HGB BLD-MCNC: 12.9 G/DL (ref 12–16)
HGB UR QL STRIP: NEGATIVE
IMM GRANULOCYTES # BLD AUTO: 0.01 K/UL (ref 0–0.04)
IMM GRANULOCYTES NFR BLD AUTO: 0.2 % (ref 0–0.5)
KETONES UR QL STRIP: NEGATIVE
LEUKOCYTE ESTERASE UR QL STRIP: ABNORMAL
LYMPHOCYTES # BLD AUTO: 1.6 K/UL (ref 1–4.8)
LYMPHOCYTES NFR BLD: 33.5 % (ref 18–48)
MAGNESIUM SERPL-MCNC: 1.9 MG/DL (ref 1.6–2.6)
MCH RBC QN AUTO: 29.4 PG (ref 27–31)
MCHC RBC AUTO-ENTMCNC: 31.8 G/DL (ref 32–36)
MCV RBC AUTO: 93 FL (ref 82–98)
MICROSCOPIC COMMENT: NORMAL
MONOCYTES # BLD AUTO: 0.5 K/UL (ref 0.3–1)
MONOCYTES NFR BLD: 10 % (ref 4–15)
NEUTROPHILS # BLD AUTO: 2.6 K/UL (ref 1.8–7.7)
NEUTROPHILS NFR BLD: 53.2 % (ref 38–73)
NITRITE UR QL STRIP: NEGATIVE
NRBC BLD-RTO: 0 /100 WBC
PH UR STRIP: 6 [PH] (ref 5–8)
PLATELET # BLD AUTO: 178 K/UL (ref 150–450)
PMV BLD AUTO: 9.1 FL (ref 9.2–12.9)
POCT GLUCOSE: 109 MG/DL (ref 70–110)
POTASSIUM SERPL-SCNC: 4.2 MMOL/L (ref 3.5–5.1)
PROT SERPL-MCNC: 6.2 G/DL (ref 6–8.4)
PROT UR QL STRIP: NEGATIVE
RBC # BLD AUTO: 4.39 M/UL (ref 4–5.4)
SODIUM SERPL-SCNC: 142 MMOL/L (ref 136–145)
SP GR UR STRIP: 1.02 (ref 1–1.03)
TROPONIN I SERPL DL<=0.01 NG/ML-MCNC: 0.01 NG/ML (ref 0–0.03)
TSH SERPL DL<=0.005 MIU/L-ACNC: 0.76 UIU/ML (ref 0.4–4)
URN SPEC COLLECT METH UR: ABNORMAL
UROBILINOGEN UR STRIP-ACNC: NEGATIVE EU/DL
WBC # BLD AUTO: 4.8 K/UL (ref 3.9–12.7)
WBC #/AREA URNS HPF: 2 /HPF (ref 0–5)

## 2021-12-06 PROCEDURE — 85025 COMPLETE CBC W/AUTO DIFF WBC: CPT | Performed by: EMERGENCY MEDICINE

## 2021-12-06 PROCEDURE — 93010 ELECTROCARDIOGRAM REPORT: CPT | Mod: ,,, | Performed by: INTERNAL MEDICINE

## 2021-12-06 PROCEDURE — 93005 ELECTROCARDIOGRAM TRACING: CPT

## 2021-12-06 PROCEDURE — 83735 ASSAY OF MAGNESIUM: CPT | Performed by: EMERGENCY MEDICINE

## 2021-12-06 PROCEDURE — 36000 PLACE NEEDLE IN VEIN: CPT

## 2021-12-06 PROCEDURE — 99285 EMERGENCY DEPT VISIT HI MDM: CPT | Mod: 25

## 2021-12-06 PROCEDURE — 84484 ASSAY OF TROPONIN QUANT: CPT | Performed by: EMERGENCY MEDICINE

## 2021-12-06 PROCEDURE — 93010 EKG 12-LEAD: ICD-10-PCS | Mod: ,,, | Performed by: INTERNAL MEDICINE

## 2021-12-06 PROCEDURE — 84443 ASSAY THYROID STIM HORMONE: CPT | Performed by: EMERGENCY MEDICINE

## 2021-12-06 PROCEDURE — 81000 URINALYSIS NONAUTO W/SCOPE: CPT | Performed by: EMERGENCY MEDICINE

## 2021-12-06 PROCEDURE — 80053 COMPREHEN METABOLIC PANEL: CPT | Performed by: EMERGENCY MEDICINE

## 2021-12-06 RX ORDER — ISOSORBIDE MONONITRATE 30 MG/1
30 TABLET, EXTENDED RELEASE ORAL 2 TIMES DAILY
COMMUNITY
Start: 2021-12-01 | End: 2022-02-23 | Stop reason: SDUPTHER

## 2021-12-15 ENCOUNTER — PROCEDURE VISIT (OUTPATIENT)
Dept: ORTHOPEDICS | Facility: CLINIC | Age: 81
End: 2021-12-15
Payer: MEDICARE

## 2021-12-15 VITALS — HEIGHT: 61 IN | WEIGHT: 135 LBS | BODY MASS INDEX: 25.49 KG/M2

## 2021-12-15 DIAGNOSIS — M17.11 PRIMARY OSTEOARTHRITIS OF RIGHT KNEE: Primary | ICD-10-CM

## 2021-12-15 PROCEDURE — 20610 DRAIN/INJ JOINT/BURSA W/O US: CPT | Mod: S$PBB,RT,, | Performed by: PHYSICIAN ASSISTANT

## 2021-12-15 PROCEDURE — 20610 LARGE JOINT ASPIRATION/INJECTION: R KNEE: ICD-10-PCS | Mod: S$PBB,RT,, | Performed by: PHYSICIAN ASSISTANT

## 2021-12-15 PROCEDURE — 20610 DRAIN/INJ JOINT/BURSA W/O US: CPT | Mod: PBBFAC,RT | Performed by: PHYSICIAN ASSISTANT

## 2021-12-15 RX ADMIN — Medication 48 MG: at 09:12

## 2021-12-17 ENCOUNTER — HOSPITAL ENCOUNTER (OUTPATIENT)
Dept: RADIOLOGY | Facility: HOSPITAL | Age: 81
Discharge: HOME | End: 2021-12-17
Attending: FAMILY MEDICINE
Payer: MEDICARE

## 2021-12-17 VITALS — WEIGHT: 134.94 LBS | HEIGHT: 61 IN | BODY MASS INDEX: 25.48 KG/M2

## 2021-12-17 DIAGNOSIS — Z12.31 SCREENING MAMMOGRAM FOR BREAST CANCER: ICD-10-CM

## 2021-12-17 PROCEDURE — 77063 MAMMO DIGITAL SCREENING BILAT WITH TOMO: ICD-10-PCS | Mod: 26,,, | Performed by: RADIOLOGY

## 2021-12-17 PROCEDURE — 77063 BREAST TOMOSYNTHESIS BI: CPT | Mod: 26,,, | Performed by: RADIOLOGY

## 2021-12-17 PROCEDURE — 77067 SCR MAMMO BI INCL CAD: CPT | Mod: 26,,, | Performed by: RADIOLOGY

## 2021-12-17 PROCEDURE — 77067 SCR MAMMO BI INCL CAD: CPT | Mod: TC

## 2021-12-17 PROCEDURE — 77067 MAMMO DIGITAL SCREENING BILAT WITH TOMO: ICD-10-PCS | Mod: 26,,, | Performed by: RADIOLOGY

## 2021-12-28 LAB — CRC RECOMMENDATION EXT: NORMAL

## 2021-12-31 ENCOUNTER — EXTERNAL CHRONIC CARE MANAGEMENT (OUTPATIENT)
Dept: PRIMARY CARE CLINIC | Facility: CLINIC | Age: 81
End: 2021-12-31
Payer: MEDICARE

## 2021-12-31 PROCEDURE — 99490 CHRNC CARE MGMT STAFF 1ST 20: CPT | Mod: S$PBB,,, | Performed by: FAMILY MEDICINE

## 2021-12-31 PROCEDURE — 99490 CHRNC CARE MGMT STAFF 1ST 20: CPT | Mod: PBBFAC | Performed by: FAMILY MEDICINE

## 2021-12-31 PROCEDURE — 99490 PR CHRONIC CARE MGMT, 1ST 20 MIN: ICD-10-PCS | Mod: S$PBB,,, | Performed by: FAMILY MEDICINE

## 2022-01-24 ENCOUNTER — TELEPHONE (OUTPATIENT)
Dept: ORTHOPEDICS | Facility: CLINIC | Age: 82
End: 2022-01-24
Payer: MEDICARE

## 2022-01-24 NOTE — TELEPHONE ENCOUNTER
Called to do telemedicine visit with the patient, unable to reach left a voicemail at [phone]

## 2022-01-26 ENCOUNTER — TELEPHONE (OUTPATIENT)
Dept: ORTHOPEDICS | Facility: CLINIC | Age: 82
End: 2022-01-26
Payer: MEDICARE

## 2022-01-26 NOTE — TELEPHONE ENCOUNTER
Spoke w/ pt's granddaughter and she stated that she did not know of a missed call but would check w/ the pt and reach out if needed.     ----- Message from Vidya Avitia sent at 1/26/2022  2:03 PM CST -----  Contact: [phone]  Patient is returning a phone call.  Who left a message for the patient: VANNESSA Bailey's office  Does patient know what this is regarding:  no  Would you like a call back, or a response through your [facility] portal?:     Comments:

## 2022-01-26 NOTE — TELEPHONE ENCOUNTER
Scheduled appt w/ pt's granddaughter. I explained that this would be an audio visit. Understanding verbalized.     ----- Message from Monica Meyer sent at 1/25/2022  4:49 PM CST -----  .Type:  Patient Returning Call    Who Called:FIDEL BAILON [[id number]]  Who Left Message for Patient:nurse  Does the patient know what this is regarding?:  Would the patient rather a call back or a response via [facility]? call  Best Call Back Number:[phone]  Additional Information:

## 2022-01-28 ENCOUNTER — PATIENT OUTREACH (OUTPATIENT)
Dept: ADMINISTRATIVE | Facility: OTHER | Age: 82
End: 2022-01-28
Payer: MEDICARE

## 2022-01-31 ENCOUNTER — EXTERNAL CHRONIC CARE MANAGEMENT (OUTPATIENT)
Dept: PRIMARY CARE CLINIC | Facility: CLINIC | Age: 82
End: 2022-01-31
Payer: MEDICARE

## 2022-01-31 ENCOUNTER — OFFICE VISIT (OUTPATIENT)
Dept: ORTHOPEDICS | Facility: CLINIC | Age: 82
End: 2022-01-31
Payer: MEDICARE

## 2022-01-31 DIAGNOSIS — M25.561 RIGHT KNEE PAIN, UNSPECIFIED CHRONICITY: ICD-10-CM

## 2022-01-31 DIAGNOSIS — M25.462 EFFUSION OF LEFT KNEE: ICD-10-CM

## 2022-01-31 DIAGNOSIS — M17.11 PRIMARY OSTEOARTHRITIS OF RIGHT KNEE: Primary | ICD-10-CM

## 2022-01-31 PROCEDURE — 99490 PR CHRONIC CARE MGMT, 1ST 20 MIN: ICD-10-PCS | Mod: S$PBB,,, | Performed by: FAMILY MEDICINE

## 2022-01-31 PROCEDURE — 99441 PR PHYSICIAN TELEPHONE EVALUATION 5-10 MIN: ICD-10-PCS | Mod: 95,,, | Performed by: PHYSICIAN ASSISTANT

## 2022-01-31 PROCEDURE — 99490 CHRNC CARE MGMT STAFF 1ST 20: CPT | Mod: S$PBB,,, | Performed by: FAMILY MEDICINE

## 2022-01-31 PROCEDURE — 99441 PR PHYSICIAN TELEPHONE EVALUATION 5-10 MIN: CPT | Mod: 95,,, | Performed by: PHYSICIAN ASSISTANT

## 2022-01-31 PROCEDURE — 99490 CHRNC CARE MGMT STAFF 1ST 20: CPT | Mod: PBBFAC | Performed by: FAMILY MEDICINE

## 2022-01-31 NOTE — PROGRESS NOTES
Telemedicine Visit  The patient location is: [facility]  The chief complaint leading to consultation is: see HPI  Each patient to whom he or she provides medical services by telemedicine is:  (1) informed of the relationship between the physician and patient and the respective role of any other health care provider with respect to management of the patient; and (2) notified that he or she may decline to receive medical services by telemedicine and may withdraw from such care at any time.The patient location is: home     VISIT TYPE X   Virtual visit with synchronous audio and video     Telephone E/M service  x      Patient ID: Leslie Wood  YOB: 1940  MRN: [id number]    Chief Complaint: Right knee pain     History of Present Illness: Leslie Wood is a 81 y.o. female presents today for a telemedicine visit, she is s/p 1 series right knee visco injectiion on 12/15/21. She states that she has not gotten as much relief as she has previously gotten still notices days of achy and pain in the right knee. Has also had a previous fracture on the left knee with no pain. Denies any fevers, chills, night sweats, numbness and tingling.    HPI    Past Medical History:   Past Medical History:   Diagnosis Date    Arthritis     Cataract     GERD (gastroesophageal reflux disease)     Hyperlipidemia     Hypertension     Stroke      Past Surgical History:   Procedure Laterality Date    ADENOIDECTOMY      ADRENAL GLAND SURGERY      APPENDECTOMY      BACK SURGERY      fusion l 4-5 s 1,2,3  fusion l 2-3    EYE SURGERY      HEMORRHOID SURGERY      HERNIA REPAIR      HYSTERECTOMY      indirect lumbar decompression      percutaneous placement of interspinous extension blocker    TONSILLECTOMY       Family History   Problem Relation Age of Onset    Heart disease Mother     Hypertension Mother     Diabetes Mother     Hypertension Father     Kidney disease Father     Breast cancer Sister       Social History     Socioeconomic History    Marital status:    Tobacco Use    Smoking status: Never Smoker    Smokeless tobacco: Never Used   Substance and Sexual Activity    Alcohol use: No    Drug use: No    Sexual activity: Not Currently     Medication List with Changes/Refills   Current Medications    ASPIRIN (ECOTRIN) 81 MG EC TABLET    Take 81 mg by mouth once daily.    CARVEDILOL (COREG) 6.25 MG TABLET    Take 1 tablet (6.25 mg total) by mouth 2 (two) times daily. TAKE (1) TABLET BY MOUTH 2 TIMES A DAY WITH MEALS    CLONIDINE (CATAPRES) 0.1 MG TABLET    Take 1 tablet (0.1 mg total) by mouth 2 (two) times daily. To take an extra dose if BP >160/90    DICLOFENAC SODIUM (VOLTAREN) 1 % GEL    Apply 2 g topically 3 (three) times daily.    HYDROCODONE-ACETAMINOPHEN (NORCO)  MG PER TABLET    Take 1 tablet by mouth daily as needed.    ISOSORBIDE MONONITRATE (IMDUR) 30 MG 24 HR TABLET    Take 30 mg by mouth 2 (two) times daily.    TRAZODONE (DESYREL) 50 MG TABLET    TAKE 1 TABLET BY MOUTH ONCE AT BEDTIME.    VALSARTAN (DIOVAN) 160 MG TABLET    TAKE 1 TABLET BY MOUTH TWICE A DAY     Review of patient's allergies indicates:   Allergen Reactions    Prazosin Other (See Comments)     dizziness    Amitriptyline     Hydrochlorothiazide      Causes muscle cramping    Lisinopril      hyperkalemia    Percocet [oxycodone-acetaminophen] Other (See Comments)     Seizures    Codeine Nausea Only and Rash     ROS    Physical Exam:   NEURO: Awake, alert, and oriented x 3.  PSYCH: Mood & affect are appropriate.  Ortho/SPM Exam  Limited Physical Exam due to Telemedicine Visit      Imaging:    Mammo Digital Screening Bilat w/ Juan Ramon  Narrative: Result:  Mammo Digital Screening Bilat w/ Juan Ramon    History:  Patient is 81 y.o. and is seen for a screening mammogram.    Films Compared:  Prior images (if available) were compared.     Findings:   This procedure was performed using tomosynthesis.   Computer-aided  detection was utilized in the interpretation of this   examination.    The breasts have scattered areas of fibroglandular density. There is no   evidence of suspicious masses, microcalcifications or architectural   distortion.  Impression:    No mammographic evidence of malignancy.    BI-RADS Category 1: Negative    Recommendation:  Routine screening mammogram in 1 year, or as clinically indicated.    Your estimated lifetime risk of breast cancer (to age 85) based on   Tyrer-Cuzick risk assessment model is 1.39 %.  According to the American   Cancer Society, patients with a lifetime breast cancer risk of 20% or   higher might benefit from supplemental screening tests. ??     No new radiographic images obtained at today's visit. Previous images reviewed at this time.    Relevant imaging results reviewed and interpreted by me, discussed with the patient and / or family today.      Other Tests:     No other tests performed today.    Patient Instructions     Assessment:  Leslie Wood is a  81 y.o. female   retired with a chief complaint of right knee pain  Presents today for a recheck on right knee pain, completed a 1 series visco injection with minimal to no relief   6 weeks s/p right knee visco injection on 12/15/21    Encounter Diagnoses   Name Primary?    Primary osteoarthritis of right knee Yes    Right knee pain, unspecified chronicity     Effusion of left knee       Plan:   Right knee marklergudelia    Follow up in 5-6 weeks for alonso     Follow-up: 5-6 weeks or sooner if there are any problems between now and then.    Thank you for choosing [facility] and Dr. Kvng Cazares for your orthopedic & sports medicine care. It is our goal to provide you with exceptional care that will help keep you healthy, active, and get you back in the game.    If you felt that you received exemplary care today, please consider leaving us feedback on [employer] at  [email or web address].     Please do not hesitate to reach out to us via email, phone, or MyChart with any questions, concerns, or feedback.    If you are experiencing pain/discomfort ,or have questions after 5pm and would like to be connected to the [facility] on-call team, please call this number and specify which Sports Medicine provider is treating you: [phone]         Provider Note/Medical Decision Making:  Discussed with the patient possible use of CSI at this time do not want to affect other medical issues so will consider zileretta.      I discussed worrisome and red flag signs and symptoms with the patient. The patient expressed understanding and agreed to alert me immediately or to go to the emergency room if they experience any of these.    Treatment plan was developed with input from the patient/family, and they expressed understanding and agreement with the plan. All questions were answered today.    Total time spent with patient: see X ehsan on chart below.   More than half of the time was spent counseling or coordinating care including prognosis, differential diagnosis, risks and benefits of treatment, instructions, compliance risk reductions      PHONE        5-10  x   [id number] 11-20     29384 21-30         Disclaimer: This note was prepared using a voice recognition system and is likely to have sound alike errors within the text.

## 2022-01-31 NOTE — PATIENT INSTRUCTIONS
Assessment:  Leslie Wood is a  81 y.o. female   retired with a chief complaint of right knee pain  Presents today for a recheck on right knee pain, completed a 1 series visco injection with minimal to no relief   6 weeks s/p right knee visco injection on 12/15/21    Encounter Diagnoses   Name Primary?    Primary osteoarthritis of right knee Yes    Right knee pain, unspecified chronicity     Effusion of left knee       Plan:   Right knee zileratta    Follow up in 5-6 weeks for alonso     Follow-up: 5-6 weeks or sooner if there are any problems between now and then.    Thank you for choosing [facility] and Dr. Kvng Cazares for your orthopedic & sports medicine care. It is our goal to provide you with exceptional care that will help keep you healthy, active, and get you back in the game.    If you felt that you received exemplary care today, please consider leaving us feedback on [email or web address] at [email or web address].     Please do not hesitate to reach out to us via email, phone, or MyChart with any questions, concerns, or feedback.    If you are experiencing pain/discomfort ,or have questions after 5pm and would like to be connected to the [facility] on-call team, please call this number and specify which Sports Medicine provider is treating you: [phone]

## 2022-02-20 ENCOUNTER — HOSPITAL ENCOUNTER (EMERGENCY)
Facility: HOSPITAL | Age: 82
Discharge: HOME | End: 2022-02-21
Attending: EMERGENCY MEDICINE
Payer: COMMERCIAL

## 2022-02-20 VITALS
HEIGHT: 61 IN | WEIGHT: 135 LBS | BODY MASS INDEX: 25.49 KG/M2 | RESPIRATION RATE: 19 BRPM | SYSTOLIC BLOOD PRESSURE: 154 MMHG | DIASTOLIC BLOOD PRESSURE: 66 MMHG | OXYGEN SATURATION: 95 % | HEART RATE: 84 BPM

## 2022-02-20 DIAGNOSIS — S89.91XA RIGHT KNEE INJURY: ICD-10-CM

## 2022-02-20 DIAGNOSIS — M25.561 ACUTE PAIN OF RIGHT KNEE: Primary | ICD-10-CM

## 2022-02-20 PROCEDURE — 99284 EMERGENCY DEPT VISIT MOD MDM: CPT

## 2022-02-21 NOTE — FIRST PROVIDER EVALUATION
"Medical screening exam completed.  I have conducted a focused provider triage encounter, findings are as follows:    Brief history of present illness:  Patient presents to the ED today complaining of right knee, low back, right hip pain after fall yesterday.  Patient states that she has been weak lately and contributed to her falling.    Vitals:    02/20/22 2123   BP: (!) 154/66   BP Location: Right arm   Patient Position: Sitting   Pulse: 84   Resp: 19   TempSrc: Oral   SpO2: 95%   Weight: 61.2 kg (135 lb)   Height: 5' 1" (1.549 m)       Pertinent physical exam:  No acute distress    Brief workup plan:  Imaging and further evaluation once roomed    Preliminary workup initiated; this workup will be continued and followed by the physician or advanced practice provider that is assigned to the patient when roomed.  "

## 2022-02-28 ENCOUNTER — EXTERNAL CHRONIC CARE MANAGEMENT (OUTPATIENT)
Dept: PRIMARY CARE CLINIC | Facility: CLINIC | Age: 82
End: 2022-02-28
Payer: COMMERCIAL

## 2022-02-28 PROCEDURE — 99490 CHRNC CARE MGMT STAFF 1ST 20: CPT | Mod: S$PBB,,, | Performed by: FAMILY MEDICINE

## 2022-02-28 PROCEDURE — 99490 PR CHRONIC CARE MGMT, 1ST 20 MIN: ICD-10-PCS | Mod: S$PBB,,, | Performed by: FAMILY MEDICINE

## 2022-02-28 PROCEDURE — 99490 CHRNC CARE MGMT STAFF 1ST 20: CPT | Mod: PBBFAC | Performed by: FAMILY MEDICINE

## 2022-03-02 NOTE — ED PROVIDER NOTES
Encounter Date: 2/20/2022       History     Chief Complaint   Patient presents with    Fall     Patient states she slipped and fell on her tailbone last night. Called EMS for inability to move her R knee earlier today. States felt a pop and pain resolved but has now resolved. C/o pain in lower back and tailbone.      Patient is a 81 y.o. F who presents today after a nonsyncopal ground level fall.Fall occurred acutely last night.  She states that her knee locked up on her that she had difficulty moving the knee, but states that pain and range of motion limitations have resolved.  Her significant pain in her knee is now gone.  She does complain of some pain in her lower back and sacral area.  Denies any head trauma, neck pain, syncope, chest pain, shortness of breath, palpitations, all other symptoms.        Review of patient's allergies indicates:   Allergen Reactions    Prazosin Other (See Comments)     dizziness    Amitriptyline     Hydrochlorothiazide      Causes muscle cramping    Lisinopril      hyperkalemia    Percocet [oxycodone-acetaminophen] Other (See Comments)     Seizures    Codeine Nausea Only and Rash     Past Medical History:   Diagnosis Date    Arthritis     Cataract     GERD (gastroesophageal reflux disease)     Hyperlipidemia     Hypertension     Stroke      Past Surgical History:   Procedure Laterality Date    ADENOIDECTOMY      ADRENAL GLAND SURGERY      APPENDECTOMY      BACK SURGERY      fusion l 4-5 s 1,2,3  fusion l 2-3    EYE SURGERY      HEMORRHOID SURGERY      HERNIA REPAIR      HYSTERECTOMY      indirect lumbar decompression      percutaneous placement of interspinous extension blocker    TONSILLECTOMY       Family History   Problem Relation Age of Onset    Heart disease Mother     Hypertension Mother     Diabetes Mother     Hypertension Father     Kidney disease Father     Breast cancer Sister      Social History     Tobacco Use    Smoking status: Never  Smoker    Smokeless tobacco: Never Used   Substance Use Topics    Alcohol use: No    Drug use: No     Review of Systems     Constitutional: No fevers, no fatigue  HENT: No headache, no facial pain, no hearing loss  Eyes: No vision changes, no eye pain  Neck/Back: No neck pain, no back pain  Cardiovascular: No chest pain, no palpitations, no syncope  Respiratory: no SOB, no cough  Abdominal: no abdominal pain, no N/V  Genitourinary: no pelvic pain, no genital pain  Musculoskeletal: Right knee pain, low back pain  Neurological: No numbness, no paresthesias, no weakness, no LOC      Physical Exam     Initial Vitals [02/20/22 2123]   BP Pulse Resp Temp SpO2   (!) 154/66 84 19 -- 95 %      MAP       --         Physical Exam     Constitutional: Awake, alert, NAD  HENT: normocephalic, no facial bone tenderness, no evidence of basilar skull fx  Eyes: PERRL, EOM, normal conjunctiva  Neck: Trachea midline, nontender, full ROM  Cardiovascular: RRR, 2+ palpable pulses in all 4 extremities  Pulmonary: Non-labored respirations, equal bilateral breath sounds, LCTAB  Chest Wall: No tenderness, no deformity  Abdominal: Soft, nontender, nondistended  Back:  Mild lumbar tenderness, no thoracic tendernessno step-offs  Musculoskeletal: Moving all 4 extremities, no deformity, no tenderness, compartments soft,   Full range of motion of the right knee and right hip  Neurological: AAO x4, GCS 15, maintaining airway and answering questions appropriately, no focal deficits  Skin: no lacerations or abrasions      ED Course   Procedures  Labs Reviewed - No data to display       Imaging Results          X-Ray Knee 3 View Right (Final result)  Result time 02/20/22 22:55:52    Final result by Patti Workman MD (02/20/22 22:55:52)                 Impression:      As above      Electronically signed by: Ji Armstrong  Date:    02/20/2022  Time:    22:55             Narrative:    EXAMINATION:  XR KNEE 3 VIEW RIGHT    CLINICAL HISTORY:  Unspecified  injury of right lower leg, initial encounter    TECHNIQUE:  AP, lateral, and Merchant views of the left knee were performed.    COMPARISON:  None    FINDINGS:  No acute fracture or dislocation.  Moderate severe tri compartment degenerative disc disease                               X-Ray Hip 2 or 3 views Right (with Pelvis when performed) (Final result)  Result time 02/20/22 22:56:36    Final result by Patti Workman MD (02/20/22 22:56:36)                 Impression:      No acute abnormality.      Electronically signed by: Ji Armstrong  Date:    02/20/2022  Time:    22:56             Narrative:    EXAMINATION:  XR HIP WITH PELVIS WHEN PERFORMED, 2 OR 3  VIEWS RIGHT    CLINICAL HISTORY:  XR HIP WITH PELVIS WHEN PERFORMED, 2 OR 3  VIEWS RIGHT    COMPARISON:  None    FINDINGS:  Multiple radiographic views  were obtained.    No acute fracture.  Degenerative joint disease identified.  Decreased bone mineral density.  Device and postsurgical changes in the right hemipelvis and spine.                               X-Ray Lumbar Spine Ap And Lateral (Final result)  Result time 02/20/22 22:54:59    Final result by Patti Workman MD (02/20/22 22:54:59)                 Impression:      Multiple senescent/chronic changes and postsurgical changes.  No acute process seen      Electronically signed by: Ji Armstrong  Date:    02/20/2022  Time:    22:54             Narrative:    EXAMINATION:  XR LUMBAR SPINE AP AND LATERAL    CLINICAL HISTORY:  XR LUMBAR SPINE AP AND LATERAL    COMPARISON:  None    FINDINGS:  Multiple radiographic views  were obtained.    Degenerative joint disease of the spine.  Hernia repair noted.  Device with wires identified.  Degenerative joint disease of the spine.  Postsurgical changes with hyperdense region between the disc space of L3-L4.  Degenerative joint disease identified.  Mild retrolisthesis of to L2 with respect to L1 likely on a degenerative basis.                                 Medications -  No data to display                       Clinical Impression:   Final diagnoses:  [S89.91XA] Right knee injury  [M25.561] Acute pain of right knee (Primary)          ED Disposition Condition    Discharge Stable        ED Prescriptions     None        Follow-up Information     Follow up With Specialties Details Why Contact Info    Follow-up with your orthopedic        [facility] Dept. Emergency Medicine  As needed, If symptoms worsen [id number] [facility] [id number] [phone]           Daniel Aguilar MD  03/02/22 [id number]

## 2022-03-31 ENCOUNTER — EXTERNAL CHRONIC CARE MANAGEMENT (OUTPATIENT)
Dept: PRIMARY CARE CLINIC | Facility: CLINIC | Age: 82
End: 2022-03-31
Payer: MEDICARE

## 2022-03-31 PROCEDURE — 99490 PR CHRONIC CARE MGMT, 1ST 20 MIN: ICD-10-PCS | Mod: S$PBB,,, | Performed by: FAMILY MEDICINE

## 2022-03-31 PROCEDURE — 99439 PR CHRONIC CARE MGMT, EA ADDTL 20 MIN: ICD-10-PCS | Mod: S$PBB,,, | Performed by: FAMILY MEDICINE

## 2022-03-31 PROCEDURE — 99490 CHRNC CARE MGMT STAFF 1ST 20: CPT | Mod: S$PBB,,, | Performed by: FAMILY MEDICINE

## 2022-03-31 PROCEDURE — 99439 CHRNC CARE MGMT STAF EA ADDL: CPT | Mod: S$PBB,,, | Performed by: FAMILY MEDICINE

## 2022-04-12 ENCOUNTER — PATIENT OUTREACH (OUTPATIENT)
Dept: ADMINISTRATIVE | Facility: HOSPITAL | Age: 82
End: 2022-04-12
Payer: MEDICARE

## 2022-04-12 NOTE — PROGRESS NOTES
Manually uploaded and linked 2021 Colonoscopy to .    No repeat of colonoscopy due pt aged out.      Request faxed for DM EYE EXAM.

## 2022-04-12 NOTE — LETTER
AUTHORIZATION FOR RELEASE OF   CONFIDENTIAL INFORMATION      We are seeing Lesile Wood, date of birth 1940, in the clinic at [facility]. Angeles Carson MD is the patient's PCP. Leslie Wood has an outstanding lab/procedure at the time we reviewed her chart. In order to help keep her health information updated, she has authorized us to request the following medical record(s):        (  )  MAMMOGRAM                                      (  )  COLONOSCOPY      (  )  PAP SMEAR                                          (  )  OUTSIDE LAB RESULTS     (  )  DEXA SCAN                                          ( X )  EYE EXAM            (  )  FOOT EXAM                                          (  )  ENTIRE RECORD     (  )  OUTSIDE IMMUNIZATIONS                 (  )  _______________         Please fax records to [facility], [phone]     If you have any questions, please contact [facility]           Patient Name: Leslie Wood  : 1940  Patient Phone #: [phone]

## 2022-04-14 DIAGNOSIS — I10 ESSENTIAL HYPERTENSION: ICD-10-CM

## 2022-04-15 RX ORDER — CLONIDINE HYDROCHLORIDE 0.1 MG/1
0.1 TABLET ORAL 2 TIMES DAILY
Qty: 90 TABLET | Refills: 11 | Status: ON HOLD | OUTPATIENT
Start: 2022-04-15 | End: 2022-05-21 | Stop reason: HOSPADM

## 2022-04-15 RX ORDER — ISOSORBIDE MONONITRATE 30 MG/1
TABLET, EXTENDED RELEASE ORAL
Qty: 60 TABLET | Refills: 3 | Status: ON HOLD | OUTPATIENT
Start: 2022-04-15 | End: 2022-05-21 | Stop reason: HOSPADM

## 2022-04-30 ENCOUNTER — EXTERNAL CHRONIC CARE MANAGEMENT (OUTPATIENT)
Dept: PRIMARY CARE CLINIC | Facility: CLINIC | Age: 82
End: 2022-04-30
Payer: MEDICARE

## 2022-04-30 PROCEDURE — 99439 PR CHRONIC CARE MGMT, EA ADDTL 20 MIN: ICD-10-PCS | Mod: S$PBB,,, | Performed by: FAMILY MEDICINE

## 2022-04-30 PROCEDURE — 99439 CHRNC CARE MGMT STAF EA ADDL: CPT | Mod: S$PBB,,, | Performed by: FAMILY MEDICINE

## 2022-04-30 PROCEDURE — 99490 CHRNC CARE MGMT STAFF 1ST 20: CPT | Mod: S$PBB,,, | Performed by: FAMILY MEDICINE

## 2022-04-30 PROCEDURE — 99490 PR CHRONIC CARE MGMT, 1ST 20 MIN: ICD-10-PCS | Mod: S$PBB,,, | Performed by: FAMILY MEDICINE

## 2022-05-05 ENCOUNTER — TELEPHONE (OUTPATIENT)
Dept: ORTHOPEDICS | Facility: CLINIC | Age: 82
End: 2022-05-05
Payer: MEDICARE

## 2022-05-05 NOTE — TELEPHONE ENCOUNTER
Scheduled soonest available for R Knee injection w/ the pt's granddaughter. Understanding verbalized.

## 2022-05-19 ENCOUNTER — HOSPITAL ENCOUNTER (INPATIENT)
Facility: HOSPITAL | Age: 82
LOS: 1 days | Discharge: HOME HEALTH | DRG: 287 | End: 2022-05-21
Attending: EMERGENCY MEDICINE | Admitting: INTERNAL MEDICINE
Payer: MEDICARE

## 2022-05-19 DIAGNOSIS — R74.8 ELEVATED LIPASE: ICD-10-CM

## 2022-05-19 DIAGNOSIS — I21.4 NSTEMI (NON-ST ELEVATED MYOCARDIAL INFARCTION): Primary | ICD-10-CM

## 2022-05-19 DIAGNOSIS — R07.9 CHEST PAIN: ICD-10-CM

## 2022-05-19 DIAGNOSIS — I20.0 UNSTABLE ANGINA: ICD-10-CM

## 2022-05-19 LAB
ALBUMIN SERPL BCP-MCNC: 3.8 G/DL (ref 3.5–5.2)
ALP SERPL-CCNC: 101 U/L (ref 55–135)
ALT SERPL W/O P-5'-P-CCNC: 67 U/L (ref 10–44)
ANION GAP SERPL CALC-SCNC: 13 MMOL/L (ref 8–16)
APTT BLDCRRT: 22.6 SEC (ref 21–32)
AST SERPL-CCNC: 147 U/L (ref 10–40)
BASOPHILS # BLD AUTO: 0.03 K/UL (ref 0–0.2)
BASOPHILS NFR BLD: 0.3 % (ref 0–1.9)
BILIRUB SERPL-MCNC: 0.6 MG/DL (ref 0.1–1)
BUN SERPL-MCNC: 27 MG/DL (ref 8–23)
CALCIUM SERPL-MCNC: 9.5 MG/DL (ref 8.7–10.5)
CHLORIDE SERPL-SCNC: 105 MMOL/L (ref 95–110)
CO2 SERPL-SCNC: 21 MMOL/L (ref 23–29)
CREAT SERPL-MCNC: 1.1 MG/DL (ref 0.5–1.4)
DIFFERENTIAL METHOD: NORMAL
EOSINOPHIL # BLD AUTO: 0.2 K/UL (ref 0–0.5)
EOSINOPHIL NFR BLD: 2.1 % (ref 0–8)
ERYTHROCYTE [DISTWIDTH] IN BLOOD BY AUTOMATED COUNT: 13.2 % (ref 11.5–14.5)
EST. GFR  (AFRICAN AMERICAN): 54 ML/MIN/1.73 M^2
EST. GFR  (NON AFRICAN AMERICAN): 47 ML/MIN/1.73 M^2
GLUCOSE SERPL-MCNC: 141 MG/DL (ref 70–110)
HCT VFR BLD AUTO: 42.2 % (ref 37–48.5)
HGB BLD-MCNC: 13.8 G/DL (ref 12–16)
IMM GRANULOCYTES # BLD AUTO: 0.02 K/UL (ref 0–0.04)
IMM GRANULOCYTES NFR BLD AUTO: 0.2 % (ref 0–0.5)
INR PPP: 1 (ref 0.8–1.2)
LIPASE SERPL-CCNC: 176 U/L (ref 4–60)
LYMPHOCYTES # BLD AUTO: 2 K/UL (ref 1–4.8)
LYMPHOCYTES NFR BLD: 21.3 % (ref 18–48)
MCH RBC QN AUTO: 29.1 PG (ref 27–31)
MCHC RBC AUTO-ENTMCNC: 32.7 G/DL (ref 32–36)
MCV RBC AUTO: 89 FL (ref 82–98)
MONOCYTES # BLD AUTO: 0.9 K/UL (ref 0.3–1)
MONOCYTES NFR BLD: 9.5 % (ref 4–15)
NEUTROPHILS # BLD AUTO: 6.1 K/UL (ref 1.8–7.7)
NEUTROPHILS NFR BLD: 66.6 % (ref 38–73)
NRBC BLD-RTO: 0 /100 WBC
PLATELET # BLD AUTO: 222 K/UL (ref 150–450)
PMV BLD AUTO: 9.4 FL (ref 9.2–12.9)
POTASSIUM SERPL-SCNC: 4.3 MMOL/L (ref 3.5–5.1)
PROT SERPL-MCNC: 6.9 G/DL (ref 6–8.4)
PROTHROMBIN TIME: 10.4 SEC (ref 9–12.5)
RBC # BLD AUTO: 4.75 M/UL (ref 4–5.4)
SODIUM SERPL-SCNC: 139 MMOL/L (ref 136–145)
TROPONIN I SERPL DL<=0.01 NG/ML-MCNC: 2.33 NG/ML (ref 0–0.03)
WBC # BLD AUTO: 9.22 K/UL (ref 3.9–12.7)

## 2022-05-19 PROCEDURE — 80053 COMPREHEN METABOLIC PANEL: CPT | Performed by: EMERGENCY MEDICINE

## 2022-05-19 PROCEDURE — 85025 COMPLETE CBC W/AUTO DIFF WBC: CPT | Performed by: EMERGENCY MEDICINE

## 2022-05-19 PROCEDURE — 85730 THROMBOPLASTIN TIME PARTIAL: CPT | Performed by: EMERGENCY MEDICINE

## 2022-05-19 PROCEDURE — 85610 PROTHROMBIN TIME: CPT | Performed by: EMERGENCY MEDICINE

## 2022-05-19 PROCEDURE — 82553 CREATINE MB FRACTION: CPT | Performed by: EMERGENCY MEDICINE

## 2022-05-19 PROCEDURE — 84484 ASSAY OF TROPONIN QUANT: CPT | Performed by: EMERGENCY MEDICINE

## 2022-05-19 PROCEDURE — 25000003 PHARM REV CODE 250: Performed by: EMERGENCY MEDICINE

## 2022-05-19 PROCEDURE — 83880 ASSAY OF NATRIURETIC PEPTIDE: CPT | Performed by: EMERGENCY MEDICINE

## 2022-05-19 PROCEDURE — 63600175 PHARM REV CODE 636 W HCPCS: Performed by: EMERGENCY MEDICINE

## 2022-05-19 PROCEDURE — 99291 CRITICAL CARE FIRST HOUR: CPT | Mod: 25

## 2022-05-19 PROCEDURE — 83690 ASSAY OF LIPASE: CPT | Performed by: EMERGENCY MEDICINE

## 2022-05-19 PROCEDURE — 96375 TX/PRO/DX INJ NEW DRUG ADDON: CPT

## 2022-05-19 PROCEDURE — 96365 THER/PROPH/DIAG IV INF INIT: CPT

## 2022-05-19 PROCEDURE — U0002 COVID-19 LAB TEST NON-CDC: HCPCS | Performed by: EMERGENCY MEDICINE

## 2022-05-19 RX ORDER — CLOPIDOGREL 300 MG/1
600 TABLET, FILM COATED ORAL
Status: COMPLETED | OUTPATIENT
Start: 2022-05-19 | End: 2022-05-19

## 2022-05-19 RX ORDER — NITROGLYCERIN 20 MG/100ML
0-200 INJECTION INTRAVENOUS CONTINUOUS
Status: DISCONTINUED | OUTPATIENT
Start: 2022-05-20 | End: 2022-05-20

## 2022-05-19 RX ORDER — MORPHINE SULFATE 4 MG/ML
2 INJECTION, SOLUTION INTRAMUSCULAR; INTRAVENOUS
Status: COMPLETED | OUTPATIENT
Start: 2022-05-19 | End: 2022-05-19

## 2022-05-19 RX ORDER — ATORVASTATIN CALCIUM 40 MG/1
80 TABLET, FILM COATED ORAL
Status: COMPLETED | OUTPATIENT
Start: 2022-05-19 | End: 2022-05-19

## 2022-05-19 RX ORDER — HEPARIN SODIUM,PORCINE/D5W 25000/250
0-40 INTRAVENOUS SOLUTION INTRAVENOUS CONTINUOUS
Status: DISCONTINUED | OUTPATIENT
Start: 2022-05-19 | End: 2022-05-20

## 2022-05-19 RX ADMIN — MORPHINE SULFATE 2 MG: 4 INJECTION INTRAVENOUS at 11:05

## 2022-05-19 RX ADMIN — NITROGLYCERIN 5 MCG/MIN: 20 INJECTION INTRAVENOUS at 11:05

## 2022-05-19 RX ADMIN — ATORVASTATIN CALCIUM 80 MG: 40 TABLET, FILM COATED ORAL at 11:05

## 2022-05-19 RX ADMIN — CLOPIDOGREL BISULFATE 600 MG: 300 TABLET, FILM COATED ORAL at 11:05

## 2022-05-19 RX ADMIN — HEPARIN SODIUM 12 UNITS/KG/HR: 5000 INJECTION INTRAVENOUS; SUBCUTANEOUS at 11:05

## 2022-05-19 NOTE — Clinical Note
The DP pulses were 2+ bilaterally. The left PT pulse was 1+. The radial pulses were +2 bilaterally.

## 2022-05-19 NOTE — Clinical Note
The catheter was inserted over the wire into the left ventricle. Hemodynamics were performed.  An angiography was performed of the LV.

## 2022-05-19 NOTE — Clinical Note
The left ventricle was hand injected. The injected rate was 12 mL/sec. The total injected volume was 20 mL.

## 2022-05-19 NOTE — Clinical Note
The catheter was inserted over the wire into the ostium   right coronary artery. The catheter was unable to access the area..

## 2022-05-20 PROBLEM — R74.01 TRANSAMINITIS: Status: ACTIVE | Noted: 2022-05-20

## 2022-05-20 PROBLEM — N17.9 AKI (ACUTE KIDNEY INJURY): Status: ACTIVE | Noted: 2022-05-20

## 2022-05-20 PROBLEM — E78.5 HYPERLIPIDEMIA LDL GOAL <100: Chronic | Status: ACTIVE | Noted: 2018-07-30

## 2022-05-20 PROBLEM — I21.4 NSTEMI (NON-ST ELEVATED MYOCARDIAL INFARCTION): Status: ACTIVE | Noted: 2022-05-20

## 2022-05-20 PROBLEM — I20.0 UNSTABLE ANGINA: Status: ACTIVE | Noted: 2022-05-20

## 2022-05-20 PROBLEM — E11.9 TYPE 2 DIABETES MELLITUS WITHOUT COMPLICATION, WITHOUT LONG-TERM CURRENT USE OF INSULIN: Chronic | Status: ACTIVE | Noted: 2020-01-24

## 2022-05-20 PROBLEM — M81.8 OTHER OSTEOPOROSIS WITHOUT CURRENT PATHOLOGICAL FRACTURE: Status: ACTIVE | Noted: 2018-07-30

## 2022-05-20 LAB
ALBUMIN SERPL BCP-MCNC: 3.5 G/DL (ref 3.5–5.2)
ALP SERPL-CCNC: 82 U/L (ref 55–135)
ALT SERPL W/O P-5'-P-CCNC: 62 U/L (ref 10–44)
ANION GAP SERPL CALC-SCNC: 12 MMOL/L (ref 8–16)
AORTIC ROOT ANNULUS: 2.68 CM
APTT BLDCRRT: 29.7 SEC (ref 21–32)
APTT BLDCRRT: 33.9 SEC (ref 21–32)
ASCENDING AORTA: 2.39 CM
AST SERPL-CCNC: 117 U/L (ref 10–40)
AV INDEX (PROSTH): 0.64
AV MEAN GRADIENT: 5 MMHG
AV PEAK GRADIENT: 9 MMHG
AV REGURGITATION PRESSURE HALF TIME: 492.68 MS
AV VALVE AREA: 1.57 CM2
AV VELOCITY RATIO: 0.62
BASOPHILS # BLD AUTO: 0.04 K/UL (ref 0–0.2)
BASOPHILS NFR BLD: 0.4 % (ref 0–1.9)
BILIRUB SERPL-MCNC: 0.5 MG/DL (ref 0.1–1)
BNP SERPL-MCNC: 59 PG/ML (ref 0–99)
BSA FOR ECHO PROCEDURE: 1.61 M2
BUN SERPL-MCNC: 25 MG/DL (ref 8–23)
CALCIUM SERPL-MCNC: 9.1 MG/DL (ref 8.7–10.5)
CATH EF QUANTITATIVE: 40 %
CHLORIDE SERPL-SCNC: 104 MMOL/L (ref 95–110)
CHOLEST SERPL-MCNC: 155 MG/DL (ref 120–199)
CHOLEST/HDLC SERPL: 3.4 {RATIO} (ref 2–5)
CK MB SERPL-MCNC: 15.9 NG/ML (ref 0.1–6.5)
CK MB SERPL-RTO: 12.9 % (ref 0–5)
CK SERPL-CCNC: 123 U/L (ref 20–180)
CO2 SERPL-SCNC: 23 MMOL/L (ref 23–29)
CREAT SERPL-MCNC: 1 MG/DL (ref 0.5–1.4)
CTP QC/QA: YES
CV ECHO LV RWT: 0.72 CM
DIFFERENTIAL METHOD: ABNORMAL
DOP CALC AO PEAK VEL: 1.5 M/S
DOP CALC AO VTI: 38.3 CM
DOP CALC LVOT AREA: 2.5 CM2
DOP CALC LVOT DIAMETER: 1.77 CM
DOP CALC LVOT PEAK VEL: 0.93 M/S
DOP CALC LVOT STROKE VOLUME: 60.01 CM3
DOP CALC MV VTI: 25.8 CM
DOP CALC RVOT PEAK VEL: 0.74 M/S
DOP CALC RVOT VTI: 19 CM
DOP CALCLVOT PEAK VEL VTI: 24.4 CM
E WAVE DECELERATION TIME: 291.2 MSEC
E/A RATIO: 0.63
E/E' RATIO: 14.29 M/S
ECHO LV POSTERIOR WALL: 1.3 CM (ref 0.6–1.1)
EJECTION FRACTION: 30 %
EOSINOPHIL # BLD AUTO: 0 K/UL (ref 0–0.5)
EOSINOPHIL NFR BLD: 0.3 % (ref 0–8)
ERYTHROCYTE [DISTWIDTH] IN BLOOD BY AUTOMATED COUNT: 13.2 % (ref 11.5–14.5)
EST. GFR  (AFRICAN AMERICAN): >60 ML/MIN/1.73 M^2
EST. GFR  (NON AFRICAN AMERICAN): 52 ML/MIN/1.73 M^2
ESTIMATED AVG GLUCOSE: 103 MG/DL (ref 68–131)
FRACTIONAL SHORTENING: 28 % (ref 28–44)
GLUCOSE SERPL-MCNC: 135 MG/DL (ref 70–110)
HBA1C MFR BLD: 5.2 % (ref 4–5.6)
HCT VFR BLD AUTO: 39.1 % (ref 37–48.5)
HDLC SERPL-MCNC: 45 MG/DL (ref 40–75)
HDLC SERPL: 29 % (ref 20–50)
HGB BLD-MCNC: 12.4 G/DL (ref 12–16)
IMM GRANULOCYTES # BLD AUTO: 0.04 K/UL (ref 0–0.04)
IMM GRANULOCYTES NFR BLD AUTO: 0.4 % (ref 0–0.5)
INTERVENTRICULAR SEPTUM: 1.3 CM (ref 0.6–1.1)
IVC DIAMETER: 2.27 CM
IVRT: 125.59 MSEC
LA MAJOR: 4.99 CM
LA MINOR: 5 CM
LDLC SERPL CALC-MCNC: 98 MG/DL (ref 63–159)
LEFT ATRIUM SIZE: 3.92 CM
LEFT INTERNAL DIMENSION IN SYSTOLE: 2.6 CM (ref 2.1–4)
LEFT VENTRICLE DIASTOLIC VOLUME INDEX: 34.39 ML/M2
LEFT VENTRICLE DIASTOLIC VOLUME: 54.68 ML
LEFT VENTRICLE MASS INDEX: 101 G/M2
LEFT VENTRICLE SYSTOLIC VOLUME INDEX: 15.4 ML/M2
LEFT VENTRICLE SYSTOLIC VOLUME: 24.52 ML
LEFT VENTRICULAR INTERNAL DIMENSION IN DIASTOLE: 3.61 CM (ref 3.5–6)
LEFT VENTRICULAR MASS: 160.71 G
LV LATERAL E/E' RATIO: 12.5 M/S
LV SEPTAL E/E' RATIO: 16.67 M/S
LVOT MG: 1.54 MMHG
LVOT MV: 0.56 CM/S
LYMPHOCYTES # BLD AUTO: 1.8 K/UL (ref 1–4.8)
LYMPHOCYTES NFR BLD: 18.2 % (ref 18–48)
MAGNESIUM SERPL-MCNC: 1.8 MG/DL (ref 1.6–2.6)
MCH RBC QN AUTO: 29.4 PG (ref 27–31)
MCHC RBC AUTO-ENTMCNC: 31.7 G/DL (ref 32–36)
MCV RBC AUTO: 93 FL (ref 82–98)
MONOCYTES # BLD AUTO: 0.7 K/UL (ref 0.3–1)
MONOCYTES NFR BLD: 7.3 % (ref 4–15)
MV MEAN GRADIENT: 1 MMHG
MV PEAK A VEL: 0.79 M/S
MV PEAK E VEL: 0.5 M/S
MV PEAK GRADIENT: 2 MMHG
MV VALVE AREA BY CONTINUITY EQUATION: 2.33 CM2
NEUTROPHILS # BLD AUTO: 7.2 K/UL (ref 1.8–7.7)
NEUTROPHILS NFR BLD: 73.4 % (ref 38–73)
NONHDLC SERPL-MCNC: 110 MG/DL
NRBC BLD-RTO: 0 /100 WBC
PISA AR MAX VEL: 3.66 M/S
PISA MRMAX VEL: 4.26 M/S
PISA TR MAX VEL: 3.18 M/S
PLATELET # BLD AUTO: 230 K/UL (ref 150–450)
PMV BLD AUTO: 10 FL (ref 9.2–12.9)
POCT GLUCOSE: 128 MG/DL (ref 70–110)
POCT GLUCOSE: 157 MG/DL (ref 70–110)
POTASSIUM SERPL-SCNC: 4.4 MMOL/L (ref 3.5–5.1)
PROT SERPL-MCNC: 6.1 G/DL (ref 6–8.4)
PV MEAN GRADIENT: 1.3 MMHG
RA MAJOR: 4.03 CM
RA PRESSURE: 15 MMHG
RA WIDTH: 2.72 CM
RBC # BLD AUTO: 4.22 M/UL (ref 4–5.4)
SARS-COV-2 RDRP RESP QL NAA+PROBE: NEGATIVE
SINUS: 2.55 CM
SODIUM SERPL-SCNC: 139 MMOL/L (ref 136–145)
STJ: 2.63 CM
TDI LATERAL: 0.04 M/S
TDI SEPTAL: 0.03 M/S
TDI: 0.04 M/S
TR MAX PG: 40 MMHG
TRICUSPID ANNULAR PLANE SYSTOLIC EXCURSION: 1.43 CM
TRIGL SERPL-MCNC: 60 MG/DL (ref 30–150)
TROPONIN I SERPL DL<=0.01 NG/ML-MCNC: 7.26 NG/ML (ref 0–0.03)
TROPONIN I SERPL DL<=0.01 NG/ML-MCNC: 8.92 NG/ML (ref 0–0.03)
TV REST PULMONARY ARTERY PRESSURE: 55 MMHG
WBC # BLD AUTO: 9.84 K/UL (ref 3.9–12.7)

## 2022-05-20 PROCEDURE — 85730 THROMBOPLASTIN TIME PARTIAL: CPT | Mod: 91 | Performed by: FAMILY MEDICINE

## 2022-05-20 PROCEDURE — 93458 L HRT ARTERY/VENTRICLE ANGIO: CPT | Mod: 26,,, | Performed by: INTERNAL MEDICINE

## 2022-05-20 PROCEDURE — 25500020 PHARM REV CODE 255: Performed by: INTERNAL MEDICINE

## 2022-05-20 PROCEDURE — 20000000 HC ICU ROOM

## 2022-05-20 PROCEDURE — 83036 HEMOGLOBIN GLYCOSYLATED A1C: CPT | Performed by: NURSE PRACTITIONER

## 2022-05-20 PROCEDURE — 36415 COLL VENOUS BLD VENIPUNCTURE: CPT | Performed by: EMERGENCY MEDICINE

## 2022-05-20 PROCEDURE — 80053 COMPREHEN METABOLIC PANEL: CPT | Performed by: NURSE PRACTITIONER

## 2022-05-20 PROCEDURE — 93458 L HRT ARTERY/VENTRICLE ANGIO: CPT | Performed by: INTERNAL MEDICINE

## 2022-05-20 PROCEDURE — 99291 PR CRITICAL CARE, E/M 30-74 MINUTES: ICD-10-PCS | Mod: ,,,

## 2022-05-20 PROCEDURE — 27000221 HC OXYGEN, UP TO 24 HOURS

## 2022-05-20 PROCEDURE — 25000003 PHARM REV CODE 250: Performed by: INTERNAL MEDICINE

## 2022-05-20 PROCEDURE — 99291 CRITICAL CARE FIRST HOUR: CPT | Mod: ,,,

## 2022-05-20 PROCEDURE — 85025 COMPLETE CBC W/AUTO DIFF WBC: CPT | Performed by: NURSE PRACTITIONER

## 2022-05-20 PROCEDURE — 85730 THROMBOPLASTIN TIME PARTIAL: CPT | Performed by: NURSE PRACTITIONER

## 2022-05-20 PROCEDURE — 84484 ASSAY OF TROPONIN QUANT: CPT | Performed by: NURSE PRACTITIONER

## 2022-05-20 PROCEDURE — 25000003 PHARM REV CODE 250: Performed by: FAMILY MEDICINE

## 2022-05-20 PROCEDURE — 99223 1ST HOSP IP/OBS HIGH 75: CPT | Mod: 25,,, | Performed by: INTERNAL MEDICINE

## 2022-05-20 PROCEDURE — 63600175 PHARM REV CODE 636 W HCPCS: Performed by: INTERNAL MEDICINE

## 2022-05-20 PROCEDURE — 99900035 HC TECH TIME PER 15 MIN (STAT)

## 2022-05-20 PROCEDURE — 99153 MOD SED SAME PHYS/QHP EA: CPT | Performed by: INTERNAL MEDICINE

## 2022-05-20 PROCEDURE — 25000003 PHARM REV CODE 250: Performed by: EMERGENCY MEDICINE

## 2022-05-20 PROCEDURE — 99152 MOD SED SAME PHYS/QHP 5/>YRS: CPT | Performed by: INTERNAL MEDICINE

## 2022-05-20 PROCEDURE — 83735 ASSAY OF MAGNESIUM: CPT | Performed by: NURSE PRACTITIONER

## 2022-05-20 PROCEDURE — C1894 INTRO/SHEATH, NON-LASER: HCPCS | Performed by: INTERNAL MEDICINE

## 2022-05-20 PROCEDURE — 99223 PR INITIAL HOSPITAL CARE,LEVL III: ICD-10-PCS | Mod: 25,,, | Performed by: INTERNAL MEDICINE

## 2022-05-20 PROCEDURE — 99152 PR MOD CONSCIOUS SEDATION, SAME PHYS, 5+ YRS, FIRST 15 MIN: ICD-10-PCS | Mod: ,,, | Performed by: INTERNAL MEDICINE

## 2022-05-20 PROCEDURE — 25000242 PHARM REV CODE 250 ALT 637 W/ HCPCS: Performed by: INTERNAL MEDICINE

## 2022-05-20 PROCEDURE — 80061 LIPID PANEL: CPT | Performed by: NURSE PRACTITIONER

## 2022-05-20 PROCEDURE — 99152 MOD SED SAME PHYS/QHP 5/>YRS: CPT | Mod: ,,, | Performed by: INTERNAL MEDICINE

## 2022-05-20 PROCEDURE — C1760 CLOSURE DEV, VASC: HCPCS | Performed by: INTERNAL MEDICINE

## 2022-05-20 PROCEDURE — 93458 PR CATH PLACE/CORON ANGIO, IMG SUPER/INTERP,W LEFT HEART VENTRICULOGRAPHY: ICD-10-PCS | Mod: 26,,, | Performed by: INTERNAL MEDICINE

## 2022-05-20 PROCEDURE — 96366 THER/PROPH/DIAG IV INF ADDON: CPT

## 2022-05-20 DEVICE — ANGIO-SEAL VIP VASCULAR CLOSURE DEVICE
Type: IMPLANTABLE DEVICE | Site: GROIN | Status: FUNCTIONAL
Brand: ANGIO-SEAL

## 2022-05-20 RX ORDER — SODIUM CHLORIDE 9 MG/ML
INJECTION, SOLUTION INTRAVENOUS CONTINUOUS
Status: ACTIVE | OUTPATIENT
Start: 2022-05-20 | End: 2022-05-20

## 2022-05-20 RX ORDER — MORPHINE SULFATE 4 MG/ML
4 INJECTION, SOLUTION INTRAMUSCULAR; INTRAVENOUS EVERY 4 HOURS PRN
Status: DISCONTINUED | OUTPATIENT
Start: 2022-05-20 | End: 2022-05-20

## 2022-05-20 RX ORDER — NALOXONE HCL 0.4 MG/ML
0.02 VIAL (ML) INJECTION
Status: DISCONTINUED | OUTPATIENT
Start: 2022-05-20 | End: 2022-05-21 | Stop reason: HOSPADM

## 2022-05-20 RX ORDER — IPRATROPIUM BROMIDE AND ALBUTEROL SULFATE 2.5; .5 MG/3ML; MG/3ML
3 SOLUTION RESPIRATORY (INHALATION) EVERY 4 HOURS PRN
Status: DISCONTINUED | OUTPATIENT
Start: 2022-05-20 | End: 2022-05-21 | Stop reason: HOSPADM

## 2022-05-20 RX ORDER — BISACODYL 10 MG
10 SUPPOSITORY, RECTAL RECTAL DAILY PRN
Status: DISCONTINUED | OUTPATIENT
Start: 2022-05-20 | End: 2022-05-21 | Stop reason: HOSPADM

## 2022-05-20 RX ORDER — INSULIN ASPART 100 [IU]/ML
0-5 INJECTION, SOLUTION INTRAVENOUS; SUBCUTANEOUS
Status: DISCONTINUED | OUTPATIENT
Start: 2022-05-20 | End: 2022-05-21 | Stop reason: HOSPADM

## 2022-05-20 RX ORDER — ONDANSETRON 2 MG/ML
4 INJECTION INTRAMUSCULAR; INTRAVENOUS EVERY 8 HOURS PRN
Status: DISCONTINUED | OUTPATIENT
Start: 2022-05-20 | End: 2022-05-21 | Stop reason: HOSPADM

## 2022-05-20 RX ORDER — SODIUM CHLORIDE 9 MG/ML
INJECTION, SOLUTION INTRAVENOUS CONTINUOUS
Status: DISCONTINUED | OUTPATIENT
Start: 2022-05-20 | End: 2022-05-20

## 2022-05-20 RX ORDER — MAG HYDROX/ALUMINUM HYD/SIMETH 200-200-20
30 SUSPENSION, ORAL (FINAL DOSE FORM) ORAL 4 TIMES DAILY PRN
Status: DISCONTINUED | OUTPATIENT
Start: 2022-05-20 | End: 2022-05-21 | Stop reason: HOSPADM

## 2022-05-20 RX ORDER — TRAZODONE HYDROCHLORIDE 50 MG/1
50 TABLET ORAL NIGHTLY PRN
Status: DISCONTINUED | OUTPATIENT
Start: 2022-05-20 | End: 2022-05-21 | Stop reason: HOSPADM

## 2022-05-20 RX ORDER — NITROGLYCERIN 0.4 MG/1
0.4 TABLET SUBLINGUAL EVERY 5 MIN PRN
Status: DISCONTINUED | OUTPATIENT
Start: 2022-05-20 | End: 2022-05-21 | Stop reason: HOSPADM

## 2022-05-20 RX ORDER — MUPIROCIN 20 MG/G
OINTMENT TOPICAL 2 TIMES DAILY
Status: DISCONTINUED | OUTPATIENT
Start: 2022-05-20 | End: 2022-05-21 | Stop reason: HOSPADM

## 2022-05-20 RX ORDER — CLOPIDOGREL BISULFATE 75 MG/1
75 TABLET ORAL DAILY
Status: DISCONTINUED | OUTPATIENT
Start: 2022-05-20 | End: 2022-05-21 | Stop reason: HOSPADM

## 2022-05-20 RX ORDER — ALPRAZOLAM 0.25 MG/1
0.25 TABLET ORAL ONCE
Status: COMPLETED | OUTPATIENT
Start: 2022-05-20 | End: 2022-05-20

## 2022-05-20 RX ORDER — CLONIDINE HYDROCHLORIDE 0.1 MG/1
0.1 TABLET ORAL 2 TIMES DAILY
Status: DISCONTINUED | OUTPATIENT
Start: 2022-05-20 | End: 2022-05-21 | Stop reason: HOSPADM

## 2022-05-20 RX ORDER — GLUCAGON 1 MG
1 KIT INJECTION
Status: DISCONTINUED | OUTPATIENT
Start: 2022-05-20 | End: 2022-05-21 | Stop reason: HOSPADM

## 2022-05-20 RX ORDER — ONDANSETRON 8 MG/1
8 TABLET, ORALLY DISINTEGRATING ORAL EVERY 8 HOURS PRN
Status: DISCONTINUED | OUTPATIENT
Start: 2022-05-20 | End: 2022-05-20 | Stop reason: SDUPTHER

## 2022-05-20 RX ORDER — ATORVASTATIN CALCIUM 40 MG/1
80 TABLET, FILM COATED ORAL NIGHTLY
Status: DISCONTINUED | OUTPATIENT
Start: 2022-05-20 | End: 2022-05-21 | Stop reason: HOSPADM

## 2022-05-20 RX ORDER — NITROGLYCERIN 20 MG/100ML
0-200 INJECTION INTRAVENOUS CONTINUOUS
Status: DISCONTINUED | OUTPATIENT
Start: 2022-05-20 | End: 2022-05-21 | Stop reason: HOSPADM

## 2022-05-20 RX ORDER — SODIUM CHLORIDE 0.9 % (FLUSH) 0.9 %
10 SYRINGE (ML) INJECTION EVERY 12 HOURS PRN
Status: DISCONTINUED | OUTPATIENT
Start: 2022-05-20 | End: 2022-05-21 | Stop reason: HOSPADM

## 2022-05-20 RX ORDER — FUROSEMIDE 10 MG/ML
40 INJECTION INTRAMUSCULAR; INTRAVENOUS ONCE
Status: COMPLETED | OUTPATIENT
Start: 2022-05-20 | End: 2022-05-20

## 2022-05-20 RX ORDER — MORPHINE SULFATE 2 MG/ML
2 INJECTION, SOLUTION INTRAMUSCULAR; INTRAVENOUS EVERY 4 HOURS PRN
Status: DISCONTINUED | OUTPATIENT
Start: 2022-05-20 | End: 2022-05-21 | Stop reason: HOSPADM

## 2022-05-20 RX ORDER — MIDAZOLAM HYDROCHLORIDE 1 MG/ML
INJECTION, SOLUTION INTRAMUSCULAR; INTRAVENOUS
Status: DISCONTINUED | OUTPATIENT
Start: 2022-05-20 | End: 2022-05-20 | Stop reason: HOSPADM

## 2022-05-20 RX ORDER — IBUPROFEN 200 MG
24 TABLET ORAL
Status: DISCONTINUED | OUTPATIENT
Start: 2022-05-20 | End: 2022-05-21 | Stop reason: HOSPADM

## 2022-05-20 RX ORDER — LIDOCAINE HYDROCHLORIDE 20 MG/ML
INJECTION, SOLUTION EPIDURAL; INFILTRATION; INTRACAUDAL; PERINEURAL
Status: DISCONTINUED | OUTPATIENT
Start: 2022-05-20 | End: 2022-05-20 | Stop reason: HOSPADM

## 2022-05-20 RX ORDER — PROCHLORPERAZINE MALEATE 5 MG
10 TABLET ORAL EVERY 6 HOURS PRN
Status: DISCONTINUED | OUTPATIENT
Start: 2022-05-20 | End: 2022-05-21 | Stop reason: HOSPADM

## 2022-05-20 RX ORDER — ASPIRIN 81 MG/1
81 TABLET ORAL DAILY
Status: DISCONTINUED | OUTPATIENT
Start: 2022-05-20 | End: 2022-05-21 | Stop reason: HOSPADM

## 2022-05-20 RX ORDER — FAMOTIDINE 20 MG/1
20 TABLET, FILM COATED ORAL DAILY
Status: DISCONTINUED | OUTPATIENT
Start: 2022-05-20 | End: 2022-05-21 | Stop reason: HOSPADM

## 2022-05-20 RX ORDER — IBUPROFEN 200 MG
16 TABLET ORAL
Status: DISCONTINUED | OUTPATIENT
Start: 2022-05-20 | End: 2022-05-21 | Stop reason: HOSPADM

## 2022-05-20 RX ORDER — FENTANYL CITRATE 50 UG/ML
INJECTION, SOLUTION INTRAMUSCULAR; INTRAVENOUS
Status: DISCONTINUED | OUTPATIENT
Start: 2022-05-20 | End: 2022-05-20 | Stop reason: HOSPADM

## 2022-05-20 RX ORDER — ACETAMINOPHEN 325 MG/1
650 TABLET ORAL EVERY 4 HOURS PRN
Status: DISCONTINUED | OUTPATIENT
Start: 2022-05-20 | End: 2022-05-21 | Stop reason: HOSPADM

## 2022-05-20 RX ORDER — METOPROLOL TARTRATE 25 MG/1
25 TABLET, FILM COATED ORAL 3 TIMES DAILY
Status: DISCONTINUED | OUTPATIENT
Start: 2022-05-20 | End: 2022-05-21 | Stop reason: HOSPADM

## 2022-05-20 RX ADMIN — ALPRAZOLAM 0.25 MG: 0.25 TABLET ORAL at 03:05

## 2022-05-20 RX ADMIN — MUPIROCIN: 20 OINTMENT TOPICAL at 08:05

## 2022-05-20 RX ADMIN — SODIUM CHLORIDE: 0.9 INJECTION, SOLUTION INTRAVENOUS at 12:05

## 2022-05-20 RX ADMIN — ASPIRIN 81 MG: 81 TABLET, COATED ORAL at 08:05

## 2022-05-20 RX ADMIN — METOPROLOL TARTRATE 25 MG: 25 TABLET, FILM COATED ORAL at 01:05

## 2022-05-20 RX ADMIN — MORPHINE SULFATE 2 MG: 2 INJECTION, SOLUTION INTRAMUSCULAR; INTRAVENOUS at 05:05

## 2022-05-20 RX ADMIN — TRAZODONE HYDROCHLORIDE 50 MG: 50 TABLET ORAL at 11:05

## 2022-05-20 RX ADMIN — CLOPIDOGREL 75 MG: 75 TABLET, FILM COATED ORAL at 08:05

## 2022-05-20 RX ADMIN — ONDANSETRON 4 MG: 2 INJECTION INTRAMUSCULAR; INTRAVENOUS at 03:05

## 2022-05-20 RX ADMIN — FUROSEMIDE 40 MG: 10 INJECTION, SOLUTION INTRAMUSCULAR; INTRAVENOUS at 04:05

## 2022-05-20 RX ADMIN — CLONIDINE HYDROCHLORIDE 0.1 MG: 0.1 TABLET ORAL at 08:05

## 2022-05-20 RX ADMIN — FAMOTIDINE 20 MG: 20 TABLET ORAL at 08:05

## 2022-05-20 RX ADMIN — NITROGLYCERIN 0.4 MG: 0.4 TABLET SUBLINGUAL at 03:05

## 2022-05-20 RX ADMIN — NITROGLYCERIN 5 MCG/MIN: 20 INJECTION INTRAVENOUS at 04:05

## 2022-05-20 NOTE — ED PROVIDER NOTES
"SCRIBE #1 NOTE: I, Wilder Fam, am scribing for, and in the presence of, Angelo Barajas MD. I have scribed the entire note.       History     Chief Complaint   Patient presents with    Chest Pain     Patient began with cp around 1900. Arrives via [facility], received 325 ASA, 5 nitro sprays, and 4 zofran en route.     Review of patient's allergies indicates:   Allergen Reactions    Prazosin Other (See Comments)     dizziness    Amitriptyline     Hydrochlorothiazide      Causes muscle cramping    Lisinopril      hyperkalemia    Percocet [oxycodone-acetaminophen] Other (See Comments)     Seizures    Codeine Nausea Only and Rash         History of Present Illness     HPI    2022, 11:10 PM  History obtained from the EMS and patient      History of Present Illness: Leslie Wood is a 81 y.o. female patient with a PMHx of arthritis, cataract, GERD, HLD, HTN, stroke who presents to the Emergency Department for evaluation of sudden-onset sever central/L-sided chest pain that's non-radiating which onset abruptly at 7pm PTA. Pt rated her pain a 9/10 initially then 4/10 after medications were given by EMS. Pt stated her brother's  was today and she started feeling the chest pain later in the day. Pt reports the pain is "sharp" and made her immediately sit down. Pt denies smoking and stated her mother had a heart attack at a young age. Symptoms are constant and moderate in severity. No mitigating or exacerbating factors reported. Associated sxs include SOB, diaphoresis, nausea. Patient denies any sore throat, back pain, dysuria, fever, chills, and all other sxs at this time. Prior Tx includes 325 mg aspirin, 5 sprays nitro, zophran. Pt reports being allergic to percocets. No further complaints or concerns at this time.       Arrival mode: EMS    PCP: Angeles Carson MD        Past Medical History:  Past Medical History:   Diagnosis Date    Arthritis     Cataract     GERD (gastroesophageal reflux " disease)     Hyperlipidemia     Hypertension     Stroke        Past Surgical History:  Past Surgical History:   Procedure Laterality Date    ADENOIDECTOMY      ADRENAL GLAND SURGERY      APPENDECTOMY      BACK SURGERY      fusion l 4-5 s 1,2,3  fusion l 2-3    EYE SURGERY      HEMORRHOID SURGERY      HERNIA REPAIR      HYSTERECTOMY      indirect lumbar decompression      percutaneous placement of interspinous extension blocker    TONSILLECTOMY           Family History:  Family History   Problem Relation Age of Onset    Heart disease Mother     Hypertension Mother     Diabetes Mother     Hypertension Father     Kidney disease Father     Breast cancer Sister        Social History:  Social History     Tobacco Use    Smoking status: Never Smoker    Smokeless tobacco: Never Used   Substance and Sexual Activity    Alcohol use: No    Drug use: No    Sexual activity: Not Currently        Review of Systems     Review of Systems   Constitutional: Positive for diaphoresis. Negative for chills and fever.   HENT: Negative for sore throat.    Respiratory: Positive for shortness of breath.    Cardiovascular: Positive for chest pain.   Gastrointestinal: Positive for nausea.   Genitourinary: Negative for dysuria.   Musculoskeletal: Negative for back pain.   Skin: Negative for rash.   Neurological: Negative for weakness.   Hematological: Does not bruise/bleed easily.   All other systems reviewed and are negative.     Physical Exam     Initial Vitals   BP Pulse Resp Temp SpO2   05/19/22 2305 05/19/22 2305 05/19/22 2305 05/19/22 2308 05/19/22 2305   (!) 173/89 69 18 97.3 °F (36.3 °C) 95 %      MAP       --                 Physical Exam  Nursing Notes and Vital Signs Reviewed.  Constitutional: Patient is in no acute distress. Well-developed and well-nourished. Nauseated. Diaphoretic.  Head: Atraumatic. Normocephalic.  Eyes: PERRL. EOM intact. Conjunctivae are not pale. No scleral icterus.  ENT: Mucous membranes  are moist. Oropharynx is clear and symmetric.    Neck: Supple. Full ROM.   Cardiovascular: Regular rate. Regular rhythm. No murmurs, rubs, or gallops. Distal pulses are 2+ and symmetric.  Pulmonary/Chest: No respiratory distress. Clear to auscultation bilaterally. No wheezing or rales.  Abdominal: Soft and non-distended.  There is no tenderness.  No rebound, guarding, or rigidity. Good bowel sounds.  Musculoskeletal: Moves all extremities. No obvious deformities. No edema.   Skin: Warm and dry.  Neurological:  Alert, awake, and appropriate.  Normal speech.  No acute focal neurological deficits are appreciated.  Psychiatric: Normal affect. Good eye contact. Appropriate in content.     ED Course   Critical Care    Date/Time: 5/20/2022 12:00 AM  Performed by: Angelo Barajas MD  Authorized by: Angelo Barajas MD   Direct patient critical care time: 8 minutes  Additional history critical care time: 8 minutes  Ordering / reviewing critical care time: 8 minutes  Documentation critical care time: 8 minutes  Consulting other physicians critical care time: 8 minutes  Total critical care time (exclusive of procedural time) : 40 minutes  Critical care time was exclusive of teaching time and separately billable procedures and treating other patients.  Critical care was necessary to treat or prevent imminent or life-threatening deterioration of the following conditions: NSTEMI.  Critical care was time spent personally by me on the following activities: blood draw for specimens, development of treatment plan with patient or surrogate, discussions with consultants, interpretation of cardiac output measurements, evaluation of patient's response to treatment, obtaining history from patient or surrogate, examination of patient, ordering and review of laboratory studies, ordering and review of radiographic studies, pulse oximetry, re-evaluation of patient's condition, ordering and performing treatments and interventions and  "review of old charts.        ED Vital Signs:  Vitals:    05/19/22 2305 05/19/22 2308 05/19/22 2322 05/19/22 2346   BP: (!) 173/89   (!) 213/100   Pulse: 69   72   Resp: 18  18 16   Temp:  97.3 °F (36.3 °C)     TempSrc:  Oral     SpO2: 95%   96%   Weight: 61.1 kg (134 lb 9.6 oz)      Height:        05/19/22 2347 05/20/22 0001 05/20/22 0006 05/20/22 0017   BP: (!) 199/93 (!) 195/102 (!) 186/105 (!) 171/98   Pulse: 67 76 77 66   Resp: 11 (!) 27 (!) 27 (!) 24   Temp:       TempSrc:       SpO2: 96% (!) 93% (!) 93% (!) 93%   Weight:       Height:        05/20/22 0022 05/20/22 0027 05/20/22 0037 05/20/22 0057   BP: (!) 168/95 (!) 163/93 (!) 164/90 (!) 188/107   Pulse: 69 71 67 72   Resp: (!) 26   (!) 34   Temp:    98.1 °F (36.7 °C)   TempSrc:    Oral   SpO2: (!) 92% (!) 93% (!) 94% 97%   Weight:    59.9 kg (132 lb 0.9 oz)   Height:    5' 1" (1.549 m)       Abnormal Lab Results:  Labs Reviewed   COMPREHENSIVE METABOLIC PANEL - Abnormal; Notable for the following components:       Result Value    CO2 21 (*)     Glucose 141 (*)     BUN 27 (*)      (*)     ALT 67 (*)     eGFR if  54 (*)     eGFR if non  47 (*)     All other components within normal limits   TROPONIN I - Abnormal; Notable for the following components:    Troponin I 2.331 (*)     All other components within normal limits   LIPASE - Abnormal; Notable for the following components:    Lipase 176 (*)     All other components within normal limits   CBC W/ AUTO DIFFERENTIAL   PROTIME-INR   APTT   B-TYPE NATRIURETIC PEPTIDE   CK   CK-MB   SARS-COV-2 RDRP GENE        All Lab Results:  Results for orders placed or performed during the hospital encounter of 05/19/22   CBC auto differential   Result Value Ref Range    WBC 9.22 3.90 - 12.70 K/uL    RBC 4.75 4.00 - 5.40 M/uL    Hemoglobin 13.8 12.0 - 16.0 g/dL    Hematocrit 42.2 37.0 - 48.5 %    MCV 89 82 - 98 fL    MCH 29.1 27.0 - 31.0 pg    MCHC 32.7 32.0 - 36.0 g/dL    RDW 13.2 11.5 " - 14.5 %    Platelets 222 150 - 450 K/uL    MPV 9.4 9.2 - 12.9 fL    Immature Granulocytes 0.2 0.0 - 0.5 %    Gran # (ANC) 6.1 1.8 - 7.7 K/uL    Immature Grans (Abs) 0.02 0.00 - 0.04 K/uL    Lymph # 2.0 1.0 - 4.8 K/uL    Mono # 0.9 0.3 - 1.0 K/uL    Eos # 0.2 0.0 - 0.5 K/uL    Baso # 0.03 0.00 - 0.20 K/uL    nRBC 0 0 /100 WBC    Gran % 66.6 38.0 - 73.0 %    Lymph % 21.3 18.0 - 48.0 %    Mono % 9.5 4.0 - 15.0 %    Eosinophil % 2.1 0.0 - 8.0 %    Basophil % 0.3 0.0 - 1.9 %    Differential Method Automated    Comprehensive metabolic panel   Result Value Ref Range    Sodium 139 136 - 145 mmol/L    Potassium 4.3 3.5 - 5.1 mmol/L    Chloride 105 95 - 110 mmol/L    CO2 21 (L) 23 - 29 mmol/L    Glucose 141 (H) 70 - 110 mg/dL    BUN 27 (H) 8 - 23 mg/dL    Creatinine 1.1 0.5 - 1.4 mg/dL    Calcium 9.5 8.7 - 10.5 mg/dL    Total Protein 6.9 6.0 - 8.4 g/dL    Albumin 3.8 3.5 - 5.2 g/dL    Total Bilirubin 0.6 0.1 - 1.0 mg/dL    Alkaline Phosphatase 101 55 - 135 U/L     (H) 10 - 40 U/L    ALT 67 (H) 10 - 44 U/L    Anion Gap 13 8 - 16 mmol/L    eGFR if African American 54 (A) >60 mL/min/1.73 m^2    eGFR if non African American 47 (A) >60 mL/min/1.73 m^2   Troponin I   Result Value Ref Range    Troponin I 2.331 (H) 0.000 - 0.026 ng/mL   Lipase   Result Value Ref Range    Lipase 176 (H) 4 - 60 U/L   Protime-INR   Result Value Ref Range    Prothrombin Time 10.4 9.0 - 12.5 sec    INR 1.0 0.8 - 1.2   APTT   Result Value Ref Range    aPTT 22.6 21.0 - 32.0 sec   BNP   Result Value Ref Range    BNP 59 0 - 99 pg/mL   CK-MB   Result Value Ref Range     20 - 180 U/L    CPK MB 15.9 (H) 0.1 - 6.5 ng/mL    MB % 12.9 (H) 0.0 - 5.0 %   POCT COVID-19 Rapid Screening   Result Value Ref Range    POC Rapid COVID Negative Negative     Acceptable Yes          Imaging Results:  Imaging Results          X-Ray Chest AP Portable (Final result)  Result time 05/20/22 00:10:53    Final result by Moi Rogers MD (05/20/22  "00:10:53)                 Impression:      As above.      Electronically signed by: Moi Sue  Date:    05/20/2022  Time:    00:10             Narrative:    EXAMINATION:  XR CHEST AP PORTABLE    CLINICAL HISTORY:  Chest pain, unspecified    TECHNIQUE:  Single frontal view of the chest was performed.    COMPARISON:  Multiple priors.    FINDINGS:  Minimal blunting of the left costophrenic angle possibly atelectasis or scarring.  Trace pleural effusion not excluded.  Otherwise the lungs are clear, with normal appearance of pulmonary vasculature and no pleural effusion or pneumothorax.    The cardiac silhouette is normal in size. The hilar and mediastinal contours are unremarkable.    Degenerative change of the shoulders.                                 The EKG was ordered, reviewed, and independently interpreted by the ED provider.  Interpretation time: 2259  Rate: 62 BPM  Rhythm: normal sinus rhythm  Interpretation: Septal infarct, age undetermined. Abnormal EKG           The Emergency Provider reviewed the vital signs and test results, which are outlined above.     ED Discussion     10:48 PM: Dr. Carlson consulted (cardiology). Recs: No STEMI. Likely NSTEMI.  "Start IV heparin gtt, asa, Atorvastatin 80 mg now, Plavix load 600 mg x one now then 75 mg daily. Metoprolol 25 mg tid. Start IV Tridil gtt. Serial cardiac enzymes."      12:02 AM: Discussed case with Heydi Finch NP (Hospital Medicine). Dr. Sosa agrees with current care and management of pt and accepts admission.   Admitting Service: Hospital Medicine  Admitting Physician: Dr. Sosa  Admit to:  ICU    12:02 AM: Re-evaluated pt. I have discussed test results, shared treatment plan, and the need for admission with patient and family at bedside. Pt and family express understanding at this time and agree with all information. All questions answered. Pt and family have no further questions or concerns at this time. Pt is ready for admit.         Medical " Decision Making:   Clinical Tests:   Lab Tests: Ordered and Reviewed  Radiological Study: Ordered and Reviewed  Medical Tests: Ordered and Reviewed           ED Medication(s):  Medications   heparin 25,000 units in dextrose 5% 250 mL (100 units/mL) infusion LOW INTENSITY nomogram - [facility] (12 Units/kg/hr × 53.1 kg (Adjusted) Intravenous New Bag 5/19/22 9255)   heparin 25,000 units in dextrose 5% (100 units/ml) IV bolus from bag - ADDITIONAL PRN BOLUS - 60 units/kg (max bolus 4000 units) (has no administration in time range)   heparin 25,000 units in dextrose 5% (100 units/ml) IV bolus from bag - ADDITIONAL PRN BOLUS - 30 units/kg (max bolus 4000 units) (has no administration in time range)   nitroGLYCERIN 50 mg in dextrose 5 % 250 mL infusion (TITRATING) (35 mcg/min Intravenous Rate/Dose Change 5/20/22 0037)   metoprolol tartrate (LOPRESSOR) tablet 25 mg (has no administration in time range)   aspirin EC tablet 81 mg (has no administration in time range)   cloNIDine tablet 0.1 mg (has no administration in time range)   traZODone tablet 50 mg (has no administration in time range)   sodium chloride 0.9% flush 10 mL (has no administration in time range)   naloxone 0.4 mg/mL injection 0.02 mg (has no administration in time range)   glucose chewable tablet 16 g (has no administration in time range)   glucose chewable tablet 24 g (has no administration in time range)   glucagon (human recombinant) injection 1 mg (has no administration in time range)   clopidogreL tablet 75 mg (has no administration in time range)   atorvastatin tablet 80 mg (has no administration in time range)   0.9%  NaCl infusion ( Intravenous New Bag 5/20/22 0059)   morphine injection 4 mg (has no administration in time range)   bisacodyL suppository 10 mg (has no administration in time range)   ondansetron injection 4 mg (has no administration in time range)   prochlorperazine tablet 10 mg (has no administration in time range)   insulin aspart U-100 pen  0-5 Units (has no administration in time range)   albuterol-ipratropium 2.5 mg-0.5 mg/3 mL nebulizer solution 3 mL (has no administration in time range)   aluminum-magnesium hydroxide-simethicone 200-200-20 mg/5 mL suspension 30 mL (has no administration in time range)   dextrose 10% bolus 125 mL (has no administration in time range)   dextrose 10% bolus 250 mL (has no administration in time range)   morphine injection 2 mg (2 mg Intravenous Given 5/19/22 2322)   heparin 25,000 units in dextrose 5% (100 units/ml) IV bolus from bag INITIAL BOLUS (max bolus 4000 units) (3,190 Units Intravenous Bolus from Bag 5/19/22 2332)   atorvastatin tablet 80 mg (80 mg Oral Given 5/19/22 2326)   clopidogreL tablet 600 mg (600 mg Oral Given 5/19/22 2326)       Current Discharge Medication List                  Scribe Attestation:   Scribe #1: I performed the above scribed service and the documentation accurately describes the services I performed. I attest to the accuracy of the note.     Attending:   Physician Attestation Statement for Scribe #1: I, Angelo Barajas MD, personally performed the services described in this documentation, as scribed by Wilder Fam, in my presence, and it is both accurate and complete.           Clinical Impression       ICD-10-CM ICD-9-CM   1. NSTEMI (non-ST elevated myocardial infarction)  I21.4 410.70   2. Chest pain  R07.9 786.50   3. Unstable angina  I20.0 411.1   4. Elevated lipase  R74.8 790.5       Disposition:   Disposition: Admitted  Condition: Serious (ICU)         Angelo Barajas MD  05/20/22 4012

## 2022-05-20 NOTE — ASSESSMENT & PLAN NOTE
- Troponin 2.331. EKG showed septal Q waves and nonspecific ST-T abnormalities. CP free on admission.    - On Tridil drip.  - Continue heparin drip per nomogram.  - Plavix load given in the ED. Continue 75 mg Plavix daily. Continue daily ASA. Start metoprolol tartrate 25 mg TID and Lipitor 80 mg daily.     - Trend serial cardiac enzymes and EKG.  - Check FLP.  - Will obtain 2D echo.  - Cardiology consult. Will keep NPO after MN for LHC tomorrow.    Drysol Pregnancy And Lactation Text: This medication is considered safe during pregnancy and breast feeding.

## 2022-05-20 NOTE — CONSULTS
Cardiology Note:    Elderly pt presented w CP to ER.  ECG in field with septal Q waves V1-V2, ST-T wave abnormalities concerning for ischemia.  ECG in ER showed NSR, septal Q waves V1-V2, mild nonspecific st-t abnl.  No STEMI.  Sxs, Ecg improved with ntg.    Dx. ACS.  Recommendations:  IV heparin gtt.  Asa, 80 mg Atorvastatin, Metoprolol 25 mg tid as tolerated.  Plavix load 600 mg qd then 75 mg daily.  Tridil gtt, titrate for response.  Serial troponin levels.  Echocardiogram in am.  NPO after midnight for cath in am.    Discussed w ER staff.    Dr Carlson

## 2022-05-20 NOTE — SUBJECTIVE & OBJECTIVE
Interval History: See hospital course for today      Review of Systems   Constitutional:  Positive for activity change and fatigue. Negative for fever.   Respiratory:  Negative for shortness of breath.    Cardiovascular:  Positive for chest pain (improved).   Gastrointestinal:  Positive for nausea. Negative for abdominal pain and vomiting.   Musculoskeletal:  Positive for back pain (chronic).   Neurological:  Positive for weakness.   Psychiatric/Behavioral:  Positive for decreased concentration. Negative for agitation and dysphoric mood. The patient is not nervous/anxious.    Objective:     Vital Signs (Most Recent):  Temp: 98.2 °F (36.8 °C) (05/20/22 0700)  Pulse: (!) 52 (05/20/22 0900)  Resp: (!) 25 (05/20/22 0900)  BP: (!) 141/64 (05/20/22 0900)  SpO2: 100 % (05/20/22 0900)   Vital Signs (24h Range):  Temp:  [97.3 °F (36.3 °C)-98.2 °F (36.8 °C)] 98.2 °F (36.8 °C)  Pulse:  [52-79] 52  Resp:  [11-36] 25  SpO2:  [92 %-100 %] 100 %  BP: (114-213)/() 141/64     Weight: 60.3 kg (133 lb)  Body mass index is 25.13 kg/m².    Intake/Output Summary (Last 24 hours) at 5/20/2022 0926  Last data filed at 5/20/2022 0700  Gross per 24 hour   Intake 798.73 ml   Output 45 ml   Net 753.73 ml      Physical Exam  Vitals and nursing note reviewed. Exam conducted with a chaperone present (family).   Constitutional:       General: She is not in acute distress.     Appearance: She is normal weight. She is ill-appearing. She is not toxic-appearing.      Interventions: Nasal cannula in place.   HENT:      Head: Normocephalic and atraumatic.   Cardiovascular:      Rate and Rhythm: Bradycardia present.   Pulmonary:      Effort: Tachypnea present.      Breath sounds: Decreased air movement present.   Abdominal:      General: There is no distension.      Palpations: Abdomen is soft.      Tenderness: There is no abdominal tenderness.   Musculoskeletal:      Right lower leg: No edema.      Left lower leg: No edema.      Comments: Nerve  stimulator palpated lower right flank   Skin:     General: Skin is warm.   Neurological:      Mental Status: She is alert and oriented to person, place, and time.   Psychiatric:         Mood and Affect: Mood normal.         Behavior: Behavior normal. Behavior is cooperative.       Significant Labs: All pertinent labs within the past 24 hours have been reviewed.  CBC:   Recent Labs   Lab 05/19/22 2318 05/20/22  0510   WBC 9.22 9.84   HGB 13.8 12.4   HCT 42.2 39.1    230     CMP:   Recent Labs   Lab 05/19/22 2318 05/20/22  0510    139   K 4.3 4.4    104   CO2 21* 23   * 135*   BUN 27* 25*   CREATININE 1.1 1.0   CALCIUM 9.5 9.1   PROT 6.9 6.1   ALBUMIN 3.8 3.5   BILITOT 0.6 0.5   ALKPHOS 101 82   * 117*   ALT 67* 62*   ANIONGAP 13 12   EGFRNONAA 47* 52*     Cardiac Markers:   Recent Labs   Lab 05/19/22 2318   BNP 59     Troponin:   Recent Labs   Lab 05/19/22 2318 05/20/22  0510   TROPONINI 2.331* 8.923*       Significant Imaging: I have reviewed all pertinent imaging results/findings within the past 24 hours.

## 2022-05-20 NOTE — CONSULTS
[facility] ([facility])  Cardiology  Consult Note    Patient Name: Leslie Wood  MRN: [id number]  Admission Date: 5/19/2022  Hospital Length of Stay: 0 days  Code Status: Full Code   Attending Provider: Daniel Richey MD   Consulting Provider: Meka Franklin PA-C  Primary Care Physician: Angeles Carson MD  Principal Problem:Unstable angina    Patient information was obtained from patient, past medical records and ER records.     Inpatient consult to Cardiology  Consult performed by: Meka Franklin PA-C  Consult ordered by: Heydi Finch NP        Subjective:     Chief Complaint:  Chest pain/NSTEMI    HPI:   Ms. Wood is an 81 year old female patient whose current medical conditions include chronic back pain, DM type II, GERD, hyperlipidemia, HTN, and history of prior CVA who presented to [facility] ED yesterday with a chief complaint of substernal, sharp chest pain that onset 4 hours PTA. Associated symptoms included SOB, diaphoresis, and nausea. Patient denied any associated vomiting, palpitations, lightheadedness, dizziness, near syncope, or syncope. Received nitro spray en route via EMS which helped alleviate pain. Initial workup revealed troponin of 2.331 and mildly bumped LFT's. EKG showed septal Q waves and ST changes in V2 and patient was subsequently admitted for further evaluation and treatment. Cardiology consulted to assist with management. Patient seen and examined today in ICU. Remains on Tridil infusion. Currently chest pain free. No other CV complaints. She reports compliance with her medications. Followed in clinic by Dr. Martins. Troponin 2.331>8.923, repeat pending. Echo ordered. [facility] planned today.        Past Medical History:   Diagnosis Date    Arthritis     Cataract     GERD (gastroesophageal reflux disease)     Hyperlipidemia     Hypertension     Stroke        Past Surgical History:   Procedure Laterality Date    ADENOIDECTOMY      ADRENAL GLAND SURGERY       APPENDECTOMY      BACK SURGERY      fusion l 4-5 s 1,2,3  fusion l 2-3    EYE SURGERY      HEMORRHOID SURGERY      HERNIA REPAIR      HYSTERECTOMY      indirect lumbar decompression      percutaneous placement of interspinous extension blocker    TONSILLECTOMY         Review of patient's allergies indicates:   Allergen Reactions    Prazosin Other (See Comments)     dizziness    Amitriptyline     Hydrochlorothiazide      Causes muscle cramping    Lisinopril      hyperkalemia    Percocet [oxycodone-acetaminophen] Other (See Comments)     Seizures    Codeine Nausea Only and Rash       No current facility-administered medications on file prior to encounter.     Current Outpatient Medications on File Prior to Encounter   Medication Sig    aspirin (ECOTRIN) 81 MG EC tablet Take 81 mg by mouth once daily.    carvediloL (COREG) 6.25 MG tablet Take 1 tablet (6.25 mg total) by mouth 2 (two) times daily. TAKE (1) TABLET BY MOUTH 2 TIMES A DAY WITH MEALS    cloNIDine (CATAPRES) 0.1 MG tablet Take 1 tablet (0.1 mg total) by mouth 2 (two) times daily. To take an extra dose if BP >160/90    diclofenac sodium (VOLTAREN) 1 % Gel Apply 2 g topically 3 (three) times daily.    HYDROcodone-acetaminophen (NORCO)  mg per tablet Take 1 tablet by mouth daily as needed.    isosorbide mononitrate (IMDUR) 30 MG 24 hr tablet TAKE 1 TABLET BY MOUTH TWICE A DAY    traZODone (DESYREL) 50 MG tablet TAKE 1 TABLET BY MOUTH ONCE AT BEDTIME.    valsartan (DIOVAN) 160 MG tablet TAKE 1 TABLET BY MOUTH TWICE A DAY     Family History       Problem Relation (Age of Onset)    Breast cancer Sister    Diabetes Mother    Heart disease Mother    Hypertension Mother, Father    Kidney disease Father          Tobacco Use    Smoking status: Never Smoker    Smokeless tobacco: Never Used   Substance and Sexual Activity    Alcohol use: No    Drug use: No    Sexual activity: Not Currently     Review of Systems   Constitutional: Positive  for diaphoresis and malaise/fatigue.   HENT: Negative.     Eyes: Negative.    Cardiovascular:  Positive for chest pain.   Respiratory: Negative.     Endocrine: Negative.    Hematologic/Lymphatic: Negative.    Skin: Negative.    Musculoskeletal: Negative.    Gastrointestinal: Negative.    Genitourinary: Negative.    Neurological: Negative.    Psychiatric/Behavioral: Negative.     Allergic/Immunologic: Negative.    Objective:     Vital Signs (Most Recent):  Temp: 98 °F (36.7 °C) (05/20/22 0315)  Pulse: (!) 56 (05/20/22 0515)  Resp: 18 (05/20/22 0515)  BP: 114/71 (05/20/22 0515)  SpO2: 99 % (05/20/22 0732)   Vital Signs (24h Range):  Temp:  [97.3 °F (36.3 °C)-98.1 °F (36.7 °C)] 98 °F (36.7 °C)  Pulse:  [56-79] 56  Resp:  [11-34] 18  SpO2:  [92 %-99 %] 99 %  BP: (114-213)/() 114/71     Weight: 60.3 kg (133 lb)  Body mass index is 25.13 kg/m².    SpO2: 99 %  O2 Device (Oxygen Therapy): nasal cannula      Intake/Output Summary (Last 24 hours) at 5/20/2022 0900  Last data filed at 5/20/2022 0600  Gross per 24 hour   Intake 627.41 ml   Output 45 ml   Net 582.41 ml       Lines/Drains/Airways       Drain  Duration             Female External Urinary Catheter 05/20/22 0130 <1 day              Peripheral Intravenous Line  Duration                  Peripheral IV - Single Lumen 05/19/22 2310 20 G Left Forearm <1 day         Peripheral IV - Single Lumen 05/19/22 2349 22 G Right Forearm <1 day         Peripheral IV - Single Lumen 05/20/22 0233 20 G Anterior;Left;Proximal Forearm <1 day                    Physical Exam  Vitals and nursing note reviewed.   Constitutional:       General: She is not in acute distress.     Appearance: She is well-developed. She is not diaphoretic.      Comments: On supplemental O2   HENT:      Head: Normocephalic and atraumatic.   Eyes:      General:         Right eye: No discharge.         Left eye: No discharge.      Pupils: Pupils are equal, round, and reactive to light.   Neck:      Thyroid:  No thyromegaly.      Vascular: No JVD.      Trachea: No tracheal deviation.   Cardiovascular:      Rate and Rhythm: Regular rhythm. Bradycardia present.      Heart sounds: Normal heart sounds, S1 normal and S2 normal. No murmur heard.  Pulmonary:      Effort: Pulmonary effort is normal. No respiratory distress.      Breath sounds: Normal breath sounds. No wheezing or rales.   Abdominal:      General: There is no distension.      Palpations: Abdomen is soft.      Tenderness: There is no rebound.   Musculoskeletal:      Cervical back: Neck supple.      Right lower leg: No edema.      Left lower leg: No edema.   Skin:     General: Skin is warm and dry.      Findings: No erythema.   Neurological:      General: No focal deficit present.      Mental Status: She is alert and oriented to person, place, and time.   Psychiatric:         Mood and Affect: Mood normal.         Behavior: Behavior normal.         Thought Content: Thought content normal.       Significant Labs: CMP   Recent Labs   Lab 05/19/22 2318 05/20/22  0510    139   K 4.3 4.4    104   CO2 21* 23   * 135*   BUN 27* 25*   CREATININE 1.1 1.0   CALCIUM 9.5 9.1   PROT 6.9 6.1   ALBUMIN 3.8 3.5   BILITOT 0.6 0.5   ALKPHOS 101 82   * 117*   ALT 67* 62*   ANIONGAP 13 12   ESTGFRAFRICA 54* >60   EGFRNONAA 47* 52*   , CBC   Recent Labs   Lab 05/19/22 2318 05/20/22  0510   WBC 9.22 9.84   HGB 13.8 12.4   HCT 42.2 39.1    230   , Troponin   Recent Labs   Lab 05/19/22 2318 05/20/22  0510   TROPONINI 2.331* 8.923*   , and All pertinent lab results from the last 24 hours have been reviewed.    Significant Imaging: Echocardiogram: Transthoracic echo (TTE) complete (Cupid Only):   Results for orders placed or performed during the hospital encounter of 05/19/22   Echo   Result Value Ref Range    BSA 1.61 m2    TDI SEPTAL 0.03 m/s    LV LATERAL E/E' RATIO 12.50 m/s    LV SEPTAL E/E' RATIO 16.67 m/s    IVC diameter 2.27 cm    Left Ventricular  Outflow Tract Mean Velocity 0.50660484590 cm/s    Left Ventricular Outflow Tract Mean Gradient 1.54 mmHg    TDI LATERAL 0.04 m/s    LVIDd 3.61 3.5 - 6.0 cm    IVS 1.54 (A) 0.6 - 1.1 cm    Posterior Wall 1.48 (A) 0.6 - 1.1 cm    Ao root annulus 2.68 cm    LVIDs 2.60 2.1 - 4.0 cm    FS 28 28 - 44 %    Sinus 2.55 cm    STJ 2.63 cm    Ascending aorta 2.39 cm    LV mass 203.93 g    LA size 3.92 cm    TAPSE 1.43 cm    Left Ventricle Relative Wall Thickness 0.82 cm    AV regurgitation pressure 1/2 time 492.068184865645360 ms    AV mean gradient 5 mmHg    AV valve area 1.57 cm2    AV Velocity Ratio 0.62     AV index (prosthetic) 0.64     MV mean gradient 1 mmHg    MV valve area by continuity eq 2.33 cm2    PV peak gradient 2.19 mmHg    E/A ratio 0.63     Mean e' 0.04 m/s    E wave deceleration time 291.712257099655334 msec    IVRT 125.552929046013794 msec    LVOT diameter 1.77 cm    LVOT area 2.5 cm2    LVOT peak mehrdad 0.93 m/s    LVOT peak VTI 24.40 cm    Ao peak mehrdad 1.50 m/s    Ao VTI 38.3 cm    RVOT peak mehrdad 0.74 m/s    RVOT peak VTI 19.0 cm    Mr max mehrdad 4.26 m/s    LVOT stroke volume 60.01 cm3    AV peak gradient 9 mmHg    MV peak gradient 2 mmHg    PV mean gradient 1.30 mmHg    E/E' ratio 14.29 m/s    MV Peak E Mehrdad 0.50 m/s    AR Max Mehrdad 3.66 m/s    TR Max Mehrdad 3.18 m/s    MV VTI 25.8 cm    MV Peak A Mehrdad 0.79 m/s    LV Systolic Volume 24.52 mL    LV Systolic Volume Index 15.4 mL/m2    LV Diastolic Volume 54.68 mL    LV Diastolic Volume Index 34.39 mL/m2    LV Mass Index 128 g/m2    RA Major Axis 4.03 cm    Left Atrium Minor Axis 5.00 cm    Left Atrium Major Axis 4.99 cm    Triscuspid Valve Regurgitation Peak Gradient 40 mmHg    RA Width 2.72 cm   , EKG: Reviewed, and X-Ray: CXR: X-Ray Chest 1 View (CXR): No results found for this visit on 05/19/22. and X-Ray Chest PA and Lateral (CXR): No results found for this visit on 05/19/22.    Assessment and Plan:   Patient who presents with chest pain/NSTEMI. Stable this AM. Trend  troponin. Continue OMT. Check echo. [facility] today.    * Unstable angina  -See plan under NSTEMI    NSTEMI (non-ST elevated myocardial infarction)  -Presents with sharp substernal chest pain with associated SOB, diaphoresis  -Troponin 2.331>8.923, repeat pending  -Consistent with NSTEMI  -Stable during exam  -Continue ASA, Plavix, BB, statin, Tridil gtt  -Continue heparin gtt  -Check echo  -[facility] today by Dr. Martins. All risks, benefits, and treatment alternatives explained to patient in detail. All questions answered. She has agreed to proceed. Further rec's to follow.    CINTHIA (acute kidney injury)  -Stable, improved    Transaminitis  -Monitor    Type 2 diabetes mellitus without complication, without long-term current use of insulin  -Mgmt as per primary team    Hyperlipidemia LDL goal <100  -Statin therapy    Essential hypertension  -Continue BB, Clonidine  -On Tridil gtt  -Monitor BP drip        VTE Risk Mitigation (From admission, onward)         Ordered     IP VTE HIGH RISK PATIENT  Once         05/20/22 0034     Place sequential compression device  Until discontinued         05/20/22 0034     heparin 25,000 units in dextrose 5% (100 units/ml) IV bolus from bag - ADDITIONAL PRN BOLUS - 60 units/kg (max bolus 4000 units)  As needed (PRN)        Question:  Heparin Infusion Adjustment (DO NOT MODIFY ANSWER)  Answer:  \\[facility]\Images\Pharmacy\HeparinInfusions\heparin LOW INTENSITY nomogram for [facility] [id number].pdf    05/19/22 2317     heparin 25,000 units in dextrose 5% (100 units/ml) IV bolus from bag - ADDITIONAL PRN BOLUS - 30 units/kg (max bolus 4000 units)  As needed (PRN)        Question:  Heparin Infusion Adjustment (DO NOT MODIFY ANSWER)  Answer:  [facility]\Images\Pharmacy\HeparinInfusions\heparin LOW INTENSITY nomogram for [facility] [id number].pdf    05/19/22 2317     heparin 25,000 units in dextrose 5% 250 mL (100 units/mL) infusion LOW INTENSITY nomogram - [facility]  Continuous        Question Answer Comment    Heparin Infusion Adjustment (DO NOT MODIFY ANSWER) [email or web address]\Images\Pharmacy\HeparinInfusions\heparin LOW INTENSITY nomogram for [facility] [id number].pdf    Begin at (in units/kg/hr) 12        05/19/22 1682                Thank you for your consult. I will follow-up with patient. Please contact us if you have any additional questions.    Meka Franklin PA-C  Cardiology   [facility] ([facility])

## 2022-05-20 NOTE — BRIEF OP NOTE
[facility] - Cath Lab (Hospital)  Brief Operative Note  Cardiology    SUMMARY     Surgery Date: 5/20/2022     Surgeon(s) and Role:     * Domingo Martins MD - Primary    Assisting Surgeon: None    Pre-op Diagnosis:  NSTEMI (non-ST elevated myocardial infarction) [I21.4]    Post-op Diagnosis: Post-Op Diagnosis Codes:     * NSTEMI (non-ST elevated myocardial infarction) [I21.4]    Procedure Performed:     Procedure(s) (LRB):  CATHETERIZATION, HEART, LEFT (Left)  ANGIOGRAM, CORONARY ARTERY (N/A)  Ventriculogram, Left    Technical Procedures Used:\  Results for orders placed during the hospital encounter of 05/19/22    Cardiac catheterization (Needs Review)  This result has not been signed. Information might be incomplete.    Conclusion  · The left ventricular end diastolic pressure was elevated.  · The pre-procedure left ventricular end diastolic pressure was 18.  · The ejection fraction was calculated to be 40%.  · The estimated blood loss was none.  · The coronary arteries were normal..  · The filling pressures on the left were mildly elevated.  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

## 2022-05-20 NOTE — PROGRESS NOTES
Patient arrived to ICU via stretcher with Tridil and Heparin gtts infusing. AAO. Hypertensive. Report received from [facility]LES

## 2022-05-20 NOTE — EICU
81 year old female with NSTEMI initiated on heparin infusion and nitroglycerin infusion.  PMH/O:DM,HTN,HLD,CVA,GERD,chronic back pain.  On camera assessment patient ,alert awake and communicative  /107,HR73,SPO2 97%,RR19      Lab data  Troponin 2.331  ,CPK MB15.9(normal 0.1-6.5ng/dl)  EKG septal infarct  Nuclear stress test with regadenoson performed 11/2020 was negative for ischemia.  Chest xray with blunting of left costophrenic angle due to scarring or atelectasis.  AST//67  Lipase 176  Glucose 141  ECHO 2/2020 EF 60%,mild AR,trivial pericardial effusion,  BNP normal    Assessment and plan:  1.NSTEMI- continue heparin infusion,ASA,antilipid and nitroglycerin infusion,trend troponin every 6 hours for 3 occurrences,Echo and cardiology recommendations for coronary angiogram.  2.HTN-maintain strict blood pressure control  3.DM-maintain strict glycemic control  4.Hyperlipidemia-atorvostatin as ordered  5.H/O CVA-no residual weakness noted  6.GERD-antacids as needed.

## 2022-05-20 NOTE — SUBJECTIVE & OBJECTIVE
Past Medical History:   Diagnosis Date    Arthritis     Cataract     GERD (gastroesophageal reflux disease)     Hyperlipidemia     Hypertension     Stroke        Past Surgical History:   Procedure Laterality Date    ADENOIDECTOMY      ADRENAL GLAND SURGERY      APPENDECTOMY      BACK SURGERY      fusion l 4-5 s 1,2,3  fusion l 2-3    EYE SURGERY      HEMORRHOID SURGERY      HERNIA REPAIR      HYSTERECTOMY      indirect lumbar decompression      percutaneous placement of interspinous extension blocker    TONSILLECTOMY         Review of patient's allergies indicates:   Allergen Reactions    Prazosin Other (See Comments)     dizziness    Amitriptyline     Hydrochlorothiazide      Causes muscle cramping    Lisinopril      hyperkalemia    Percocet [oxycodone-acetaminophen] Other (See Comments)     Seizures    Codeine Nausea Only and Rash       No current facility-administered medications on file prior to encounter.     Current Outpatient Medications on File Prior to Encounter   Medication Sig    aspirin (ECOTRIN) 81 MG EC tablet Take 81 mg by mouth once daily.    carvediloL (COREG) 6.25 MG tablet Take 1 tablet (6.25 mg total) by mouth 2 (two) times daily. TAKE (1) TABLET BY MOUTH 2 TIMES A DAY WITH MEALS    cloNIDine (CATAPRES) 0.1 MG tablet Take 1 tablet (0.1 mg total) by mouth 2 (two) times daily. To take an extra dose if BP >160/90    diclofenac sodium (VOLTAREN) 1 % Gel Apply 2 g topically 3 (three) times daily.    HYDROcodone-acetaminophen (NORCO)  mg per tablet Take 1 tablet by mouth daily as needed.    isosorbide mononitrate (IMDUR) 30 MG 24 hr tablet TAKE 1 TABLET BY MOUTH TWICE A DAY    traZODone (DESYREL) 50 MG tablet TAKE 1 TABLET BY MOUTH ONCE AT BEDTIME.    valsartan (DIOVAN) 160 MG tablet TAKE 1 TABLET BY MOUTH TWICE A DAY     Family History       Problem Relation (Age of Onset)    Breast cancer Sister    Diabetes Mother    Heart disease Mother    Hypertension Mother, Father    Kidney disease Father           Tobacco Use    Smoking status: Never Smoker    Smokeless tobacco: Never Used   Substance and Sexual Activity    Alcohol use: No    Drug use: No    Sexual activity: Not Currently     Review of Systems   Constitutional:  Positive for diaphoresis. Negative for chills, fatigue and fever.   HENT:  Negative for congestion and sore throat.    Respiratory:  Positive for shortness of breath. Negative for cough and wheezing.    Cardiovascular:  Positive for chest pain. Negative for palpitations and leg swelling.   Gastrointestinal:  Positive for nausea. Negative for abdominal distention, abdominal pain, constipation, diarrhea and vomiting.   Genitourinary:  Negative for dysuria and hematuria.   Musculoskeletal:  Negative for arthralgias, back pain and myalgias.   Skin:  Negative for pallor and rash.   Neurological:  Negative for dizziness, syncope, weakness, light-headedness, numbness and headaches.   Psychiatric/Behavioral:  The patient is not nervous/anxious.    All other systems reviewed and are negative.  Objective:     Vital Signs (Most Recent):  Temp: 98.1 °F (36.7 °C) (05/20/22 0057)  Pulse: 72 (05/20/22 0057)  Resp: (!) 34 (05/20/22 0057)  BP: (!) 188/107 (05/20/22 0057)  SpO2: 97 % (05/20/22 0057)   Vital Signs (24h Range):  Temp:  [97.3 °F (36.3 °C)-98.1 °F (36.7 °C)] 98.1 °F (36.7 °C)  Pulse:  [66-77] 72  Resp:  [11-34] 34  SpO2:  [92 %-97 %] 97 %  BP: (163-213)/() 188/107     Weight: 59.9 kg (132 lb 0.9 oz)  Body mass index is 24.95 kg/m².    Physical Exam  Vitals and nursing note reviewed.   Constitutional:       General: She is awake. She is not in acute distress.     Appearance: Normal appearance. She is well-developed. She is not diaphoretic.      Comments: Elderly.    HENT:      Head: Normocephalic and atraumatic.   Eyes:      Conjunctiva/sclera: Conjunctivae normal.      Comments: PERRL; EOM intact.   Cardiovascular:      Rate and Rhythm: Normal rate and regular rhythm. No extrasystoles are  present.     Heart sounds: S1 normal and S2 normal. No murmur heard.    No friction rub. No gallop.   Pulmonary:      Effort: Pulmonary effort is normal. No tachypnea, accessory muscle usage or respiratory distress.      Breath sounds: Normal breath sounds and air entry. No wheezing, rhonchi or rales.   Abdominal:      General: Bowel sounds are normal. There is no distension.      Palpations: Abdomen is soft.      Tenderness: There is no abdominal tenderness.   Musculoskeletal:         General: No tenderness or deformity. Normal range of motion.      Cervical back: Normal range of motion and neck supple.      Right lower leg: No edema.      Left lower leg: No edema.   Skin:     General: Skin is warm and dry.      Capillary Refill: Capillary refill takes less than 2 seconds.      Findings: No erythema or rash.   Neurological:      General: No focal deficit present.      Mental Status: She is alert and oriented to person, place, and time.   Psychiatric:         Mood and Affect: Mood and affect normal.         Behavior: Behavior normal. Behavior is cooperative.           Significant Labs:  Results for orders placed or performed during the hospital encounter of 05/19/22   CBC auto differential   Result Value Ref Range    WBC 9.22 3.90 - 12.70 K/uL    RBC 4.75 4.00 - 5.40 M/uL    Hemoglobin 13.8 12.0 - 16.0 g/dL    Hematocrit 42.2 37.0 - 48.5 %    MCV 89 82 - 98 fL    MCH 29.1 27.0 - 31.0 pg    MCHC 32.7 32.0 - 36.0 g/dL    RDW 13.2 11.5 - 14.5 %    Platelets 222 150 - 450 K/uL    MPV 9.4 9.2 - 12.9 fL    Immature Granulocytes 0.2 0.0 - 0.5 %    Gran # (ANC) 6.1 1.8 - 7.7 K/uL    Immature Grans (Abs) 0.02 0.00 - 0.04 K/uL    Lymph # 2.0 1.0 - 4.8 K/uL    Mono # 0.9 0.3 - 1.0 K/uL    Eos # 0.2 0.0 - 0.5 K/uL    Baso # 0.03 0.00 - 0.20 K/uL    nRBC 0 0 /100 WBC    Gran % 66.6 38.0 - 73.0 %    Lymph % 21.3 18.0 - 48.0 %    Mono % 9.5 4.0 - 15.0 %    Eosinophil % 2.1 0.0 - 8.0 %    Basophil % 0.3 0.0 - 1.9 %    Differential  Method Automated    Comprehensive metabolic panel   Result Value Ref Range    Sodium 139 136 - 145 mmol/L    Potassium 4.3 3.5 - 5.1 mmol/L    Chloride 105 95 - 110 mmol/L    CO2 21 (L) 23 - 29 mmol/L    Glucose 141 (H) 70 - 110 mg/dL    BUN 27 (H) 8 - 23 mg/dL    Creatinine 1.1 0.5 - 1.4 mg/dL    Calcium 9.5 8.7 - 10.5 mg/dL    Total Protein 6.9 6.0 - 8.4 g/dL    Albumin 3.8 3.5 - 5.2 g/dL    Total Bilirubin 0.6 0.1 - 1.0 mg/dL    Alkaline Phosphatase 101 55 - 135 U/L     (H) 10 - 40 U/L    ALT 67 (H) 10 - 44 U/L    Anion Gap 13 8 - 16 mmol/L    eGFR if African American 54 (A) >60 mL/min/1.73 m^2    eGFR if non African American 47 (A) >60 mL/min/1.73 m^2   Troponin I   Result Value Ref Range    Troponin I 2.331 (H) 0.000 - 0.026 ng/mL   Lipase   Result Value Ref Range    Lipase 176 (H) 4 - 60 U/L   Protime-INR   Result Value Ref Range    Prothrombin Time 10.4 9.0 - 12.5 sec    INR 1.0 0.8 - 1.2   APTT   Result Value Ref Range    aPTT 22.6 21.0 - 32.0 sec   BNP   Result Value Ref Range    BNP 59 0 - 99 pg/mL   CK-MB   Result Value Ref Range     20 - 180 U/L    CPK MB 15.9 (H) 0.1 - 6.5 ng/mL    MB % 12.9 (H) 0.0 - 5.0 %   POCT COVID-19 Rapid Screening   Result Value Ref Range    POC Rapid COVID Negative Negative     Acceptable Yes       All pertinent labs within the past 24 hours have been reviewed.    Significant Imaging:  Imaging Results              X-Ray Chest AP Portable (Final result)  Result time 05/20/22 00:10:53      Final result by Moi Rogers MD (05/20/22 00:10:53)                   Impression:      As above.      Electronically signed by: Moi Rogers  Date:    05/20/2022  Time:    00:10               Narrative:    EXAMINATION:  XR CHEST AP PORTABLE    CLINICAL HISTORY:  Chest pain, unspecified    TECHNIQUE:  Single frontal view of the chest was performed.    COMPARISON:  Multiple priors.    FINDINGS:  Minimal blunting of the left costophrenic angle possibly  atelectasis or scarring.  Trace pleural effusion not excluded.  Otherwise the lungs are clear, with normal appearance of pulmonary vasculature and no pleural effusion or pneumothorax.    The cardiac silhouette is normal in size. The hilar and mediastinal contours are unremarkable.    Degenerative change of the shoulders.                                     I have reviewed all pertinent imaging results/findings within the past 24 hours.      EKG: (personally reviewed)  Normal sinus rhythm, septal Q waves and nonspecific ST-T abnormalities.

## 2022-05-20 NOTE — ASSESSMENT & PLAN NOTE
- Continue statin for now. DC if LFTs trend upward.  - Hold hepatotoxic agents.  - Follow labs.     5/20  Not 10x ULN  Risk v benefit

## 2022-05-20 NOTE — ASSESSMENT & PLAN NOTE
- Diet controlled at home.  - Accuchecks with low dose SSI if needed.  - Diabetic diet.  - Check HbA1c.     5/20   Controlled   Repeat hba1c pending  Npo  Cardiac and diabetic diet when able

## 2022-05-20 NOTE — SUBJECTIVE & OBJECTIVE
Past Medical History:   Diagnosis Date    Arthritis     Cataract     GERD (gastroesophageal reflux disease)     Hyperlipidemia     Hypertension     Stroke        Past Surgical History:   Procedure Laterality Date    ADENOIDECTOMY      ADRENAL GLAND SURGERY      APPENDECTOMY      BACK SURGERY      fusion l 4-5 s 1,2,3  fusion l 2-3    EYE SURGERY      HEMORRHOID SURGERY      HERNIA REPAIR      HYSTERECTOMY      indirect lumbar decompression      percutaneous placement of interspinous extension blocker    TONSILLECTOMY         Review of patient's allergies indicates:   Allergen Reactions    Prazosin Other (See Comments)     dizziness    Amitriptyline     Hydrochlorothiazide      Causes muscle cramping    Lisinopril      hyperkalemia    Percocet [oxycodone-acetaminophen] Other (See Comments)     Seizures    Codeine Nausea Only and Rash       Family History       Problem Relation (Age of Onset)    Breast cancer Sister    Diabetes Mother    Heart disease Mother    Hypertension Mother, Father    Kidney disease Father          Tobacco Use    Smoking status: Never Smoker    Smokeless tobacco: Never Used   Substance and Sexual Activity    Alcohol use: No    Drug use: No    Sexual activity: Not Currently         Review of Systems   Constitutional:  Positive for fatigue. Negative for chills, diaphoresis and fever.   Respiratory:  Negative for cough, chest tightness, shortness of breath and wheezing.    Cardiovascular:  Positive for chest pain. Negative for palpitations and leg swelling.        Currently rates 2/10   Gastrointestinal:  Positive for nausea. Negative for abdominal distention, abdominal pain, constipation, diarrhea and vomiting.   Genitourinary:  Negative for dysuria and hematuria.   Musculoskeletal:  Negative for arthralgias and myalgias.   Skin:  Negative for rash.   Neurological:  Positive for weakness. Negative for dizziness, syncope, light-headedness, numbness and headaches.   All other systems reviewed and  are negative.  Objective:     Vital Signs (Most Recent):  Temp: 98.2 °F (36.8 °C) (05/20/22 0700)  Pulse: (!) 56 (05/20/22 1000)  Resp: (!) 35 (05/20/22 1000)  BP: (!) 83/63 (05/20/22 1000)  SpO2: 100 % (05/20/22 1000)   Vital Signs (24h Range):  Temp:  [97.3 °F (36.3 °C)-98.2 °F (36.8 °C)] 98.2 °F (36.8 °C)  Pulse:  [52-79] 56  Resp:  [11-36] 35  SpO2:  [92 %-100 %] 100 %  BP: ()/() 83/63     Weight: 60.3 kg (133 lb)  Body mass index is 25.13 kg/m².      Intake/Output Summary (Last 24 hours) at 5/20/2022 1122  Last data filed at 5/20/2022 1000  Gross per 24 hour   Intake 798.73 ml   Output 345 ml   Net 453.73 ml       Physical Exam  Vitals and nursing note reviewed.   Constitutional:       General: She is not in acute distress.     Appearance: She is normal weight. She is ill-appearing. She is not toxic-appearing.      Interventions: Nasal cannula in place.   HENT:      Head: Normocephalic and atraumatic.   Cardiovascular:      Rate and Rhythm: Regular rhythm. Bradycardia present.      Heart sounds: Normal heart sounds, S1 normal and S2 normal. No murmur heard.  Pulmonary:      Effort: Tachypnea present. No accessory muscle usage or respiratory distress.      Breath sounds: Normal breath sounds.   Abdominal:      General: There is no distension.      Palpations: Abdomen is soft.      Tenderness: There is no abdominal tenderness.   Musculoskeletal:        Back:       Right lower leg: No edema.      Left lower leg: No edema.      Comments: Nerve stimulator palpated lower right flank   Skin:     General: Skin is warm.   Neurological:      Mental Status: She is alert and oriented to person, place, and time.   Psychiatric:         Mood and Affect: Mood normal.         Behavior: Behavior normal. Behavior is cooperative.       Vents:  Oxygen Concentration (%): 28 (05/20/22 0732)    Lines/Drains/Airways       Drain  Duration             Female External Urinary Catheter 05/20/22 0130 <1 day               Peripheral Intravenous Line  Duration                  Peripheral IV - Single Lumen 05/19/22 2349 22 G Right Forearm <1 day         Peripheral IV - Single Lumen 05/20/22 0700 22 G Anterior;Right Forearm <1 day                    Significant Labs:    CBC/Anemia Profile:  Recent Labs   Lab 05/19/22 2318 05/20/22  0510   WBC 9.22 9.84   HGB 13.8 12.4   HCT 42.2 39.1    230   MCV 89 93   RDW 13.2 13.2        Chemistries:  Recent Labs   Lab 05/19/22 2318 05/20/22  0510    139   K 4.3 4.4    104   CO2 21* 23   BUN 27* 25*   CREATININE 1.1 1.0   CALCIUM 9.5 9.1   ALBUMIN 3.8 3.5   PROT 6.9 6.1   BILITOT 0.6 0.5   ALKPHOS 101 82   ALT 67* 62*   * 117*   MG  --  1.8         Recent Labs   Lab 05/19/22 2318 05/20/22  0510   TROPONINI 2.331* 8.923*     All pertinent labs within the past 24 hours have been reviewed.    Significant Imaging:   I have reviewed all pertinent imaging results/findings within the past 24 hours.  X-Ray Chest AP Portable 5/20/22:  FINDINGS:   Minimal blunting of the left costophrenic angle possibly atelectasis or scarring.  Trace pleural effusion not excluded.  Otherwise the lungs are clear, with normal appearance of pulmonary vasculature and no pleural effusion or pneumothorax.     The cardiac silhouette is normal in size. The hilar and mediastinal contours are unremarkable.   Degenerative change of the shoulders.     TTE complete 5/20/22:   The left ventricle is normal in size with concentric hypertrophy and moderately decreased systolic function.  Grade I left ventricular diastolic dysfunction.  The estimated PA systolic pressure is 55 mmHg.  Normal right ventricular size with normal right ventricular systolic function.  There is pulmonary hypertension.  Elevated central venous pressure (15 mmHg).  The estimated ejection fraction is 30%.  There are segmental left ventricular wall motion abnormalities.  Mild aortic regurgitation.  Mild mitral regurgitation.  Mild tricuspid  regurgitation.

## 2022-05-20 NOTE — ASSESSMENT & PLAN NOTE
- Patient with h/o statin intolerance. Starting on 80 mg Lipitor daily per Cardiology. FLP in AM.     5/20  Lipid panel pending  Statin intolerance?  On high dose statin without complication  Liver enzymes mildly elevated, not 10x ULN  Prior liver enzymes within normal limits

## 2022-05-20 NOTE — ASSESSMENT & PLAN NOTE
- Creatinine 1.1 (baseline 0.8).  - Will give gentle IV hydration overnight to prepare for [facility] tomorrow.   - Hold home ARB for now.   - Follow labs.     Resolved

## 2022-05-20 NOTE — ASSESSMENT & PLAN NOTE
Heparin  gtt per cardiology recs  Last trop 8.9, rpt pending  Scheduled for HC today  Continue ASA, b-blocker, statin, plavix; plan to hold b-blocker if pt remains bradycardic   Cardiology following

## 2022-05-20 NOTE — HPI
Leslie Wood is an 81 y.o. female with a PMHx of arthritis, chronic back pain, DM II, GERD, HLD, HTN, and h/o CVA who presented to the ED with c/o substernal chest pain that started suddenly approximately 4 hours PTA. Pain is sharp in nature, rated 9/10, radiates into left chest wall, and nonexertional. No aggravating or alleviating factors. Associated SOB, diaphoresis, and nausea. Denies any vomiting, neck or jaw pain, upper extremity pain, numbness/tingling, palpitations, cough, ABD pain, back pain, HA, lightheadedness, dizziness, syncope, weakness, fever or chills. She received 325 mg ASA and nitro spray x 5 per EMS en route to the ED, which improved CP to 4/10. Initial work-up in the ED showed: Troponin 2.331, BNP 59, creatinine 1.1, BUN 27, , ALT 67, unremarkable CXR. EKG showed septal Q waves and nonspecific ST-T abnormalities. She received 2 mg IV Morphine in the ED with temporary improvement in CP. Case was discussed with Cardiology per the ED, and patient started on Tridil and heparin drips per ACS protocol. Plavix load 600 mg and Lipitor 80 mg also given. Hospital Medicine was contacted for admission and patient placed in ICU for unstable angina.   Patient is a Full Code. Her daughter is SDM.

## 2022-05-20 NOTE — H&P
[facility]  History & Physical    Patient Name: Leslie Wood  MRN: [id number]  Patient Class: IP- Inpatient  Admission Date: 5/19/2022  Attending Physician: Daniel Richey MD   Primary Care Provider: Angeles Carson MD         Patient information was obtained from patient, past medical records and ER records.     Subjective:     Principal Problem:Unstable angina    Chief Complaint:   Chief Complaint   Patient presents with    Chest Pain     Patient began with cp around 1900. Arrives via [facility], received 325 ASA, 5 nitro sprays, and 4 zofran en route.        HPI: Leslie Wood is an 81 y.o. female with a PMHx of arthritis, chronic back pain, DM II, GERD, HLD, HTN, and h/o CVA who presented to the ED with c/o substernal chest pain that started suddenly approximately 4 hours PTA. Pain is sharp in nature, rated 9/10, radiates into left chest wall, and nonexertional. No aggravating or alleviating factors. Associated SOB, diaphoresis, and nausea. Denies any vomiting, neck or jaw pain, upper extremity pain, numbness/tingling, palpitations, cough, ABD pain, back pain, HA, lightheadedness, dizziness, syncope, weakness, fever or chills. She received 325 mg ASA and nitro spray x 5 per EMS en route to the ED, which improved CP to 4/10. Initial work-up in the ED showed: Troponin 2.331, BNP 59, creatinine 1.1, BUN 27, , ALT 67, unremarkable CXR. EKG showed septal Q waves and nonspecific ST-T abnormalities. She received 2 mg IV Morphine in the ED with temporary improvement in CP. Case was discussed with Cardiology per the ED, and patient started on Tridil and heparin drips per ACS protocol. Plavix load 600 mg and Lipitor 80 mg also given. Hospital Medicine was contacted for admission and patient placed in ICU for unstable angina.   Patient is a Full Code. Her daughter is SDM.       Past Medical History:   Diagnosis Date    Arthritis     Cataract     GERD (gastroesophageal reflux  disease)     Hyperlipidemia     Hypertension     Stroke        Past Surgical History:   Procedure Laterality Date    ADENOIDECTOMY      ADRENAL GLAND SURGERY      APPENDECTOMY      BACK SURGERY      fusion l 4-5 s 1,2,3  fusion l 2-3    EYE SURGERY      HEMORRHOID SURGERY      HERNIA REPAIR      HYSTERECTOMY      indirect lumbar decompression      percutaneous placement of interspinous extension blocker    TONSILLECTOMY         Review of patient's allergies indicates:   Allergen Reactions    Prazosin Other (See Comments)     dizziness    Amitriptyline     Hydrochlorothiazide      Causes muscle cramping    Lisinopril      hyperkalemia    Percocet [oxycodone-acetaminophen] Other (See Comments)     Seizures    Codeine Nausea Only and Rash       No current facility-administered medications on file prior to encounter.     Current Outpatient Medications on File Prior to Encounter   Medication Sig    aspirin (ECOTRIN) 81 MG EC tablet Take 81 mg by mouth once daily.    carvediloL (COREG) 6.25 MG tablet Take 1 tablet (6.25 mg total) by mouth 2 (two) times daily. TAKE (1) TABLET BY MOUTH 2 TIMES A DAY WITH MEALS    cloNIDine (CATAPRES) 0.1 MG tablet Take 1 tablet (0.1 mg total) by mouth 2 (two) times daily. To take an extra dose if BP >160/90    diclofenac sodium (VOLTAREN) 1 % Gel Apply 2 g topically 3 (three) times daily.    HYDROcodone-acetaminophen (NORCO)  mg per tablet Take 1 tablet by mouth daily as needed.    isosorbide mononitrate (IMDUR) 30 MG 24 hr tablet TAKE 1 TABLET BY MOUTH TWICE A DAY    traZODone (DESYREL) 50 MG tablet TAKE 1 TABLET BY MOUTH ONCE AT BEDTIME.    valsartan (DIOVAN) 160 MG tablet TAKE 1 TABLET BY MOUTH TWICE A DAY     Family History       Problem Relation (Age of Onset)    Breast cancer Sister    Diabetes Mother    Heart disease Mother    Hypertension Mother, Father    Kidney disease Father          Tobacco Use    Smoking status: Never Smoker    Smokeless  tobacco: Never Used   Substance and Sexual Activity    Alcohol use: No    Drug use: No    Sexual activity: Not Currently     Review of Systems   Constitutional:  Positive for diaphoresis. Negative for chills, fatigue and fever.   HENT:  Negative for congestion and sore throat.    Respiratory:  Positive for shortness of breath. Negative for cough and wheezing.    Cardiovascular:  Positive for chest pain. Negative for palpitations and leg swelling.   Gastrointestinal:  Positive for nausea. Negative for abdominal distention, abdominal pain, constipation, diarrhea and vomiting.   Genitourinary:  Negative for dysuria and hematuria.   Musculoskeletal:  Negative for arthralgias, back pain and myalgias.   Skin:  Negative for pallor and rash.   Neurological:  Negative for dizziness, syncope, weakness, light-headedness, numbness and headaches.   Psychiatric/Behavioral:  The patient is not nervous/anxious.    All other systems reviewed and are negative.  Objective:     Vital Signs (Most Recent):  Temp: 98.1 °F (36.7 °C) (05/20/22 0057)  Pulse: 72 (05/20/22 0057)  Resp: (!) 34 (05/20/22 0057)  BP: (!) 188/107 (05/20/22 0057)  SpO2: 97 % (05/20/22 0057)   Vital Signs (24h Range):  Temp:  [97.3 °F (36.3 °C)-98.1 °F (36.7 °C)] 98.1 °F (36.7 °C)  Pulse:  [66-77] 72  Resp:  [11-34] 34  SpO2:  [92 %-97 %] 97 %  BP: (163-213)/() 188/107     Weight: 59.9 kg (132 lb 0.9 oz)  Body mass index is 24.95 kg/m².    Physical Exam  Vitals and nursing note reviewed.   Constitutional:       General: She is awake. She is not in acute distress.     Appearance: Normal appearance. She is well-developed. She is not diaphoretic.      Comments: Elderly.    HENT:      Head: Normocephalic and atraumatic.   Eyes:      Conjunctiva/sclera: Conjunctivae normal.      Comments: PERRL; EOM intact.   Cardiovascular:      Rate and Rhythm: Normal rate and regular rhythm. No extrasystoles are present.     Heart sounds: S1 normal and S2 normal. No murmur  heard.    No friction rub. No gallop.   Pulmonary:      Effort: Pulmonary effort is normal. No tachypnea, accessory muscle usage or respiratory distress.      Breath sounds: Normal breath sounds and air entry. No wheezing, rhonchi or rales.   Abdominal:      General: Bowel sounds are normal. There is no distension.      Palpations: Abdomen is soft.      Tenderness: There is no abdominal tenderness.   Musculoskeletal:         General: No tenderness or deformity. Normal range of motion.      Cervical back: Normal range of motion and neck supple.      Right lower leg: No edema.      Left lower leg: No edema.   Skin:     General: Skin is warm and dry.      Capillary Refill: Capillary refill takes less than 2 seconds.      Findings: No erythema or rash.   Neurological:      General: No focal deficit present.      Mental Status: She is alert and oriented to person, place, and time.   Psychiatric:         Mood and Affect: Mood and affect normal.         Behavior: Behavior normal. Behavior is cooperative.           Significant Labs:  Results for orders placed or performed during the hospital encounter of 05/19/22   CBC auto differential   Result Value Ref Range    WBC 9.22 3.90 - 12.70 K/uL    RBC 4.75 4.00 - 5.40 M/uL    Hemoglobin 13.8 12.0 - 16.0 g/dL    Hematocrit 42.2 37.0 - 48.5 %    MCV 89 82 - 98 fL    MCH 29.1 27.0 - 31.0 pg    MCHC 32.7 32.0 - 36.0 g/dL    RDW 13.2 11.5 - 14.5 %    Platelets 222 150 - 450 K/uL    MPV 9.4 9.2 - 12.9 fL    Immature Granulocytes 0.2 0.0 - 0.5 %    Gran # (ANC) 6.1 1.8 - 7.7 K/uL    Immature Grans (Abs) 0.02 0.00 - 0.04 K/uL    Lymph # 2.0 1.0 - 4.8 K/uL    Mono # 0.9 0.3 - 1.0 K/uL    Eos # 0.2 0.0 - 0.5 K/uL    Baso # 0.03 0.00 - 0.20 K/uL    nRBC 0 0 /100 WBC    Gran % 66.6 38.0 - 73.0 %    Lymph % 21.3 18.0 - 48.0 %    Mono % 9.5 4.0 - 15.0 %    Eosinophil % 2.1 0.0 - 8.0 %    Basophil % 0.3 0.0 - 1.9 %    Differential Method Automated    Comprehensive metabolic panel   Result  Value Ref Range    Sodium 139 136 - 145 mmol/L    Potassium 4.3 3.5 - 5.1 mmol/L    Chloride 105 95 - 110 mmol/L    CO2 21 (L) 23 - 29 mmol/L    Glucose 141 (H) 70 - 110 mg/dL    BUN 27 (H) 8 - 23 mg/dL    Creatinine 1.1 0.5 - 1.4 mg/dL    Calcium 9.5 8.7 - 10.5 mg/dL    Total Protein 6.9 6.0 - 8.4 g/dL    Albumin 3.8 3.5 - 5.2 g/dL    Total Bilirubin 0.6 0.1 - 1.0 mg/dL    Alkaline Phosphatase 101 55 - 135 U/L     (H) 10 - 40 U/L    ALT 67 (H) 10 - 44 U/L    Anion Gap 13 8 - 16 mmol/L    eGFR if African American 54 (A) >60 mL/min/1.73 m^2    eGFR if non African American 47 (A) >60 mL/min/1.73 m^2   Troponin I   Result Value Ref Range    Troponin I 2.331 (H) 0.000 - 0.026 ng/mL   Lipase   Result Value Ref Range    Lipase 176 (H) 4 - 60 U/L   Protime-INR   Result Value Ref Range    Prothrombin Time 10.4 9.0 - 12.5 sec    INR 1.0 0.8 - 1.2   APTT   Result Value Ref Range    aPTT 22.6 21.0 - 32.0 sec   BNP   Result Value Ref Range    BNP 59 0 - 99 pg/mL   CK-MB   Result Value Ref Range     20 - 180 U/L    CPK MB 15.9 (H) 0.1 - 6.5 ng/mL    MB % 12.9 (H) 0.0 - 5.0 %   POCT COVID-19 Rapid Screening   Result Value Ref Range    POC Rapid COVID Negative Negative     Acceptable Yes       All pertinent labs within the past 24 hours have been reviewed.    Significant Imaging:  Imaging Results              X-Ray Chest AP Portable (Final result)  Result time 05/20/22 00:10:53      Final result by Moi Rogers MD (05/20/22 00:10:53)                   Impression:      As above.      Electronically signed by: Moi Rogers  Date:    05/20/2022  Time:    00:10               Narrative:    EXAMINATION:  XR CHEST AP PORTABLE    CLINICAL HISTORY:  Chest pain, unspecified    TECHNIQUE:  Single frontal view of the chest was performed.    COMPARISON:  Multiple priors.    FINDINGS:  Minimal blunting of the left costophrenic angle possibly atelectasis or scarring.  Trace pleural effusion not excluded.   Otherwise the lungs are clear, with normal appearance of pulmonary vasculature and no pleural effusion or pneumothorax.    The cardiac silhouette is normal in size. The hilar and mediastinal contours are unremarkable.    Degenerative change of the shoulders.                                     I have reviewed all pertinent imaging results/findings within the past 24 hours.      EKG: (personally reviewed)  Normal sinus rhythm, septal Q waves and nonspecific ST-T abnormalities.            Assessment/Plan:     * Unstable angina  - Troponin 2.331. EKG showed septal Q waves and nonspecific ST-T abnormalities. CP free on admission.    - On Tridil drip.  - Continue heparin drip per nomogram.  - Plavix load given in the ED. Continue 75 mg Plavix daily. Continue daily ASA. Start metoprolol tartrate 25 mg TID and Lipitor 80 mg daily.     - Trend serial cardiac enzymes and EKG.  - Check FLP.  - Will obtain 2D echo.  - Cardiology consult. Will keep NPO after MN for [facility] tomorrow.     CINTHIA (acute kidney injury)  - Creatinine 1.1 (baseline 0.8).  - Will give gentle IV hydration overnight to prepare for [facility] tomorrow.   - Hold home ARB for now.   - Follow labs.     Transaminitis  - Continue statin for now. DC if LFTs trend upward.  - Hold hepatotoxic agents.  - Follow labs.     Type 2 diabetes mellitus without complication, without long-term current use of insulin  - Diet controlled at home.  - Accuchecks with low dose SSI if needed.  - Diabetic diet.  - Check HbA1c.     Hyperlipidemia LDL goal <100  - Patient with h/o statin intolerance. Starting on 80 mg Lipitor daily per Cardiology. FLP in AM.     Essential hypertension  - On Tridil drip. Hold home Imdur for now.   - Switching Coreg to PO Lopressor as above per Cardiology.   - Continue home clonidine.  - Hold valsartan due to CINTHIA.       VTE Risk Mitigation (From admission, onward)         Ordered     IP VTE HIGH RISK PATIENT  Once         05/20/22 0034     Place sequential  compression device  Until discontinued         05/20/22 0034     heparin 25,000 units in dextrose 5% (100 units/ml) IV bolus from bag - ADDITIONAL PRN BOLUS - 60 units/kg (max bolus 4000 units)  As needed (PRN)        Question:  Heparin Infusion Adjustment (DO NOT MODIFY ANSWER)  Answer:  [email or web address]\Images\Pharmacy\HeparinInfusions\heparin LOW INTENSITY nomogram for [facility] [id number]    05/19/22 2317     heparin 25,000 units in dextrose 5% (100 units/ml) IV bolus from bag - ADDITIONAL PRN BOLUS - 30 units/kg (max bolus 4000 units)  As needed (PRN)        Question:  Heparin Infusion Adjustment (DO NOT MODIFY ANSWER)  Answer:  [email or web address]\Images\Pharmacy\HeparinInfusions\heparin LOW INTENSITY nomogram for [facility] [id number].pdf    05/19/22 2317     heparin 25,000 units in dextrose 5% 250 mL (100 units/mL) infusion LOW INTENSITY nomogram - [facility]  Continuous        Question Answer Comment   Heparin Infusion Adjustment (DO NOT MODIFY ANSWER) [email or web address]\[email or web address]\Images\Pharmacy\HeparinInfusions\heparin LOW INTENSITY nomogram for [facility] [id number]    Begin at (in units/kg/hr) 12        05/19/22 2317              Critical care time spent on the evaluation and treatment of severe organ dysfunction, review of pertinent labs and imaging studies, discussions with consulting providers and discussions with patient/family: 60 minutes.     Heydi Finch NP  Department of Hospital Medicine   '[place] - Intensive Care ([facility])

## 2022-05-20 NOTE — PLAN OF CARE
05/20/22 1209   Post-Acute Status   Post-Acute Authorization Home Health   Home Health Status Referrals Sent   Hospital Resources/Appts/Education Provided Post-Acute resouces added to AVS   Discharge Delays None known at this time   Discharge Plan   Discharge Plan A Home Health     Home health referral sent to [facility]. Choice form signed and in blue chart.

## 2022-05-20 NOTE — NURSING
Bruising and swelling noted to right fem site. MD made aware, advised manual pressure x15 minutes and sand bag after. Remain supine for a few more hours and continue to monitor site. Pulses intact and LE pink and warm. No loss of sensation or tingling.

## 2022-05-20 NOTE — PROGRESS NOTES
[facility] ([facility])  [facility] Medicine  Progress Note    Patient Name: Leslie Wood  MRN: [id number]  Patient Class: IP- Inpatient   Admission Date: 5/19/2022  Length of Stay: 0 days  Attending Physician: Daniel Richey MD  Primary Care Provider: Angeles Carson MD        Subjective:     Principal Problem:Unstable angina        HPI:  Leslie Wood is an 81 y.o. female with a PMHx of arthritis, chronic back pain, DM II, GERD, HLD, HTN, and h/o CVA who presented to the ED with c/o substernal chest pain that started suddenly approximately 4 hours PTA. Pain is sharp in nature, rated 9/10, radiates into left chest wall, and nonexertional. No aggravating or alleviating factors. Associated SOB, diaphoresis, and nausea. Denies any vomiting, neck or jaw pain, upper extremity pain, numbness/tingling, palpitations, cough, ABD pain, back pain, HA, lightheadedness, dizziness, syncope, weakness, fever or chills. She received 325 mg ASA and nitro spray x 5 per EMS en route to the ED, which improved CP to 4/10. Initial work-up in the ED showed: Troponin 2.331, BNP 59, creatinine 1.1, BUN 27, , ALT 67, unremarkable CXR. EKG showed septal Q waves and nonspecific ST-T abnormalities. She received 2 mg IV Morphine in the ED with temporary improvement in CP. Case was discussed with Cardiology per the ED, and patient started on Tridil and heparin drips per ACS protocol. Plavix load 600 mg and Lipitor 80 mg also given. Hospital Medicine was contacted for admission and patient placed in ICU for unstable angina.   Patient is a Full Code. Her daughter is SDM.       Overview/Hospital Course:  5/20 admitted overnight for chest pain, unstable angina. Cardiology consulted and recommended [facility] today. Nausea overnight. Chest pain improved. Does not wear supplemental oxygen at baseline. Has nerve stimulator for chronic back pain.      Interval History: See hospital course for today      Review of Systems   Constitutional:   Positive for activity change and fatigue. Negative for fever.   Respiratory:  Negative for shortness of breath.    Cardiovascular:  Positive for chest pain (improved).   Gastrointestinal:  Positive for nausea. Negative for abdominal pain and vomiting.   Musculoskeletal:  Positive for back pain (chronic).   Neurological:  Positive for weakness.   Psychiatric/Behavioral:  Positive for decreased concentration. Negative for agitation and dysphoric mood. The patient is not nervous/anxious.    Objective:     Vital Signs (Most Recent):  Temp: 98.2 °F (36.8 °C) (05/20/22 0700)  Pulse: (!) 52 (05/20/22 0900)  Resp: (!) 25 (05/20/22 0900)  BP: (!) 141/64 (05/20/22 0900)  SpO2: 100 % (05/20/22 0900)   Vital Signs (24h Range):  Temp:  [97.3 °F (36.3 °C)-98.2 °F (36.8 °C)] 98.2 °F (36.8 °C)  Pulse:  [52-79] 52  Resp:  [11-36] 25  SpO2:  [92 %-100 %] 100 %  BP: (114-213)/() 141/64     Weight: 60.3 kg (133 lb)  Body mass index is 25.13 kg/m².    Intake/Output Summary (Last 24 hours) at 5/20/2022 0926  Last data filed at 5/20/2022 0700  Gross per 24 hour   Intake 798.73 ml   Output 45 ml   Net 753.73 ml      Physical Exam  Vitals and nursing note reviewed. Exam conducted with a chaperone present (family).   Constitutional:       General: She is not in acute distress.     Appearance: She is normal weight. She is ill-appearing. She is not toxic-appearing.      Interventions: Nasal cannula in place.   HENT:      Head: Normocephalic and atraumatic.   Cardiovascular:      Rate and Rhythm: Bradycardia present.   Pulmonary:      Effort: Tachypnea present.      Breath sounds: Decreased air movement present.   Abdominal:      General: There is no distension.      Palpations: Abdomen is soft.      Tenderness: There is no abdominal tenderness.   Musculoskeletal:      Right lower leg: No edema.      Left lower leg: No edema.      Comments: Nerve stimulator palpated lower right flank   Skin:     General: Skin is warm.   Neurological:       Mental Status: She is alert and oriented to person, place, and time.   Psychiatric:         Mood and Affect: Mood normal.         Behavior: Behavior normal. Behavior is cooperative.       Significant Labs: All pertinent labs within the past 24 hours have been reviewed.  CBC:   Recent Labs   Lab 05/19/22 2318 05/20/22  0510   WBC 9.22 9.84   HGB 13.8 12.4   HCT 42.2 39.1    230     CMP:   Recent Labs   Lab 05/19/22 2318 05/20/22  0510    139   K 4.3 4.4    104   CO2 21* 23   * 135*   BUN 27* 25*   CREATININE 1.1 1.0   CALCIUM 9.5 9.1   PROT 6.9 6.1   ALBUMIN 3.8 3.5   BILITOT 0.6 0.5   ALKPHOS 101 82   * 117*   ALT 67* 62*   ANIONGAP 13 12   EGFRNONAA 47* 52*     Cardiac Markers:   Recent Labs   Lab 05/19/22 2318   BNP 59     Troponin:   Recent Labs   Lab 05/19/22 2318 05/20/22  0510   TROPONINI 2.331* 8.923*       Significant Imaging: I have reviewed all pertinent imaging results/findings within the past 24 hours.      Assessment/Plan:      * Unstable angina  - Troponin 2.331. EKG showed septal Q waves and nonspecific ST-T abnormalities. CP free on admission.    - On Tridil drip.  - Continue heparin drip per nomogram.  - Plavix load given in the ED. Continue 75 mg Plavix daily. Continue daily ASA. Start metoprolol tartrate 25 mg TID and Lipitor 80 mg daily.     - Trend serial cardiac enzymes and EKG.  - Check FLP.  - Will obtain 2D echo.  - Cardiology consult. Will keep NPO after MN for [facility] tomorrow.     See NSTEMI    NSTEMI (non-ST elevated myocardial infarction)  Cardiology consulted  On heparin and tridil gtt  Awaiting [facility]  Continue aspirin, beta blocker, statin, plavix  Echocardiogram pending      CINTHIA (acute kidney injury)  - Creatinine 1.1 (baseline 0.8).  - Will give gentle IV hydration overnight to prepare for [facility] tomorrow.   - Hold home ARB for now.   - Follow labs.     Resolved     Transaminitis  - Continue statin for now. DC if LFTs trend upward.  - Hold hepatotoxic  agents.  - Follow labs.     5/20  Not 10x ULN  Risk v benefit    Type 2 diabetes mellitus without complication, without long-term current use of insulin  - Diet controlled at home.  - Accuchecks with low dose SSI if needed.  - Diabetic diet.  - Check HbA1c.     5/20   Controlled   Repeat hba1c pending  Npo  Cardiac and diabetic diet when able    Hyperlipidemia LDL goal <100  - Patient with h/o statin intolerance. Starting on 80 mg Lipitor daily per Cardiology. FLP in AM.     5/20  Lipid panel pending  Statin intolerance?  On high dose statin without complication  Liver enzymes mildly elevated, not 10x ULN  Prior liver enzymes within normal limits     Chronic back pain  Nerve stimulator       Essential hypertension  - On Tridil drip. Hold home Imdur for now.   - Switching Coreg to PO Lopressor as above per Cardiology.   - Continue home clonidine.  - Hold valsartan due to CINTHIA.     5/20  On tridil gtt  Cardiology following      VTE Risk Mitigation (From admission, onward)         Ordered     IP VTE HIGH RISK PATIENT  Once         05/20/22 0034     Place sequential compression device  Until discontinued         05/20/22 0034     heparin 25,000 units in dextrose 5% (100 units/ml) IV bolus from bag - ADDITIONAL PRN BOLUS - 60 units/kg (max bolus 4000 units)  As needed (PRN)        Question:  Heparin Infusion Adjustment (DO NOT MODIFY ANSWER)  Answer:  [email or web address]\epic\Images\Pharmacy\HeparinInfusions\heparin LOW INTENSITY nomogram for [facility] [id number].pdf    05/19/22 2317     heparin 25,000 units in dextrose 5% (100 units/ml) IV bolus from bag - ADDITIONAL PRN BOLUS - 30 units/kg (max bolus 4000 units)  As needed (PRN)        Question:  Heparin Infusion Adjustment (DO NOT MODIFY ANSWER)  Answer:  \\[email or web address]\epic\Images\Pharmacy\HeparinInfusions\heparin LOW INTENSITY nomogram for [facility] [id number].pdf    05/19/22 2317     heparin 25,000 units in dextrose 5% 250 mL (100 units/mL) infusion LOW INTENSITY nomogram - [facility]  Continuous         Question Answer Comment   Heparin Infusion Adjustment (DO NOT MODIFY ANSWER) [email or web address]\epic\Images\Pharmacy\HeparinInfusions\heparin LOW INTENSITY nomogram for [facility] [id number].pdf    Begin at (in units/kg/hr) 12        05/19/22 7033                Discharge Planning   JOSE:      Code Status: Full Code   Is the patient medically ready for discharge?:     Reason for patient still in hospital (select all that apply): Patient trending condition, Laboratory test, Treatment, Imaging, Consult recommendations and Pending disposition               Critical care time spent on the evaluation and treatment of severe organ dysfunction, review of pertinent labs and imaging studies, discussions with consulting providers and discussions with patient/family: 36 minutes.      Daniel Richey MD  Department of Hospital Medicine   [facility] ([facility])

## 2022-05-20 NOTE — PLAN OF CARE
Patient anxious, given xanax. Altered with repeated questioning and AxO to self only. MD aware of neuro change, advised to monitor and involve critical care if worsening.Patient had one episode at approx 3pm of worsening CP and SOB. Restarted Nitro drip, currently at 10. Lasix given with + response of 600cc output. On 2 L nc. Voiding into purwick. Family at bedside. POC reviewed with patient and family.

## 2022-05-20 NOTE — CONSULTS
[facility] ([facility])  Critical Care Medicine  Consult Note    Patient Name: Leslie Wood  MRN: [id number]  Admission Date: 2022  Hospital Length of Stay: 0 days  Code Status: Full Code  Attending Physician: Daniel Richey MD   Primary Care Provider: Angeles Carson MD   Principal Problem: Unstable angina    [unfilled]  Subjective:     HPI:  History obtained from patient     81 year old female with PMHx of DM II, GERD, HLD, CVA, and HTN who presented to [facility] ED on the night of  for nonradiating substernal/left sided chest pain which began shortly after attending a  yesterday afternoon along with intermittent SOB, nausea, and three episodes of vomiting PTA. 325 mg ASA and 5 sprays of nitro spray administered en route by EMS which pt states relieved most of her symptoms.   Initial ED workup noted EKG with septal Q waves and nonspecific ST-T abnormalities; trop 2.331, BNP 59, creatinine 1.1, BUN 27. CXR unremarkable. Placed on Heparin and Tridil infusion per cardiology recommendation. Critical care consulted for unstable angina with need for Heparin/Nitro infusions.       Hospital/ICU Course:  - Pt arrived to ICU overnight. Currently on Tridil and Heparin infusions. NAD w/ 2/10 chest pain at this time. Vital signs stable. Angiogram planned for today      Past Medical History:   Diagnosis Date    Arthritis     Cataract     GERD (gastroesophageal reflux disease)     Hyperlipidemia     Hypertension     Stroke        Past Surgical History:   Procedure Laterality Date    ADENOIDECTOMY      ADRENAL GLAND SURGERY      APPENDECTOMY      BACK SURGERY      fusion l 4-5 s 1,2,3  fusion l 2-3    EYE SURGERY      HEMORRHOID SURGERY      HERNIA REPAIR      HYSTERECTOMY      indirect lumbar decompression      percutaneous placement of interspinous extension blocker    TONSILLECTOMY         Review of patient's allergies indicates:   Allergen Reactions    Prazosin Other (See Comments)      dizziness    Amitriptyline     Hydrochlorothiazide      Causes muscle cramping    Lisinopril      hyperkalemia    Percocet [oxycodone-acetaminophen] Other (See Comments)     Seizures    Codeine Nausea Only and Rash       Family History       Problem Relation (Age of Onset)    Breast cancer Sister    Diabetes Mother    Heart disease Mother    Hypertension Mother, Father    Kidney disease Father          Tobacco Use    Smoking status: Never Smoker    Smokeless tobacco: Never Used   Substance and Sexual Activity    Alcohol use: No    Drug use: No    Sexual activity: Not Currently         Review of Systems   Constitutional:  Positive for fatigue. Negative for chills, diaphoresis and fever.   Respiratory:  Negative for cough, chest tightness, shortness of breath and wheezing.    Cardiovascular:  Positive for chest pain. Negative for palpitations and leg swelling.        Currently rates 2/10   Gastrointestinal:  Positive for nausea. Negative for abdominal distention, abdominal pain, constipation, diarrhea and vomiting.   Genitourinary:  Negative for dysuria and hematuria.   Musculoskeletal:  Negative for arthralgias and myalgias.   Skin:  Negative for rash.   Neurological:  Positive for weakness. Negative for dizziness, syncope, light-headedness, numbness and headaches.   All other systems reviewed and are negative.  Objective:     Vital Signs (Most Recent):  Temp: 98.2 °F (36.8 °C) (05/20/22 0700)  Pulse: (!) 56 (05/20/22 1000)  Resp: (!) 35 (05/20/22 1000)  BP: (!) 83/63 (05/20/22 1000)  SpO2: 100 % (05/20/22 1000)   Vital Signs (24h Range):  Temp:  [97.3 °F (36.3 °C)-98.2 °F (36.8 °C)] 98.2 °F (36.8 °C)  Pulse:  [52-79] 56  Resp:  [11-36] 35  SpO2:  [92 %-100 %] 100 %  BP: ()/() 83/63     Weight: 60.3 kg (133 lb)  Body mass index is 25.13 kg/m².      Intake/Output Summary (Last 24 hours) at 5/20/2022 1122  Last data filed at 5/20/2022 1000  Gross per 24 hour   Intake 798.73 ml   Output 345 ml    Net 453.73 ml       Physical Exam  Vitals and nursing note reviewed.   Constitutional:       General: She is not in acute distress.     Appearance: She is normal weight. She is ill-appearing. She is not toxic-appearing.      Interventions: Nasal cannula in place.   HENT:      Head: Normocephalic and atraumatic.   Cardiovascular:      Rate and Rhythm: Regular rhythm. Bradycardia present.      Heart sounds: Normal heart sounds, S1 normal and S2 normal. No murmur heard.  Pulmonary:      Effort: Tachypnea present. No accessory muscle usage or respiratory distress.      Breath sounds: Normal breath sounds.   Abdominal:      General: There is no distension.      Palpations: Abdomen is soft.      Tenderness: There is no abdominal tenderness.   Musculoskeletal:        Back:       Right lower leg: No edema.      Left lower leg: No edema.      Comments: Nerve stimulator palpated lower right flank   Skin:     General: Skin is warm.   Neurological:      Mental Status: She is alert and oriented to person, place, and time.   Psychiatric:         Mood and Affect: Mood normal.         Behavior: Behavior normal. Behavior is cooperative.       Vents:  Oxygen Concentration (%): 28 (05/20/22 0732)    Lines/Drains/Airways       Drain  Duration             Female External Urinary Catheter 05/20/22 0130 <1 day              Peripheral Intravenous Line  Duration                  Peripheral IV - Single Lumen 05/19/22 2349 22 G Right Forearm <1 day         Peripheral IV - Single Lumen 05/20/22 0700 22 G Anterior;Right Forearm <1 day                    Significant Labs:    CBC/Anemia Profile:  Recent Labs   Lab 05/19/22 2318 05/20/22  0510   WBC 9.22 9.84   HGB 13.8 12.4   HCT 42.2 39.1    230   MCV 89 93   RDW 13.2 13.2        Chemistries:  Recent Labs   Lab 05/19/22 2318 05/20/22  0510    139   K 4.3 4.4    104   CO2 21* 23   BUN 27* 25*   CREATININE 1.1 1.0   CALCIUM 9.5 9.1   ALBUMIN 3.8 3.5   PROT 6.9 6.1   BILITOT  0.6 0.5   ALKPHOS 101 82   ALT 67* 62*   * 117*   MG  --  1.8         Recent Labs   Lab 05/19/22  2318 05/20/22  0510   TROPONINI 2.331* 8.923*     All pertinent labs within the past 24 hours have been reviewed.    Significant Imaging:   I have reviewed all pertinent imaging results/findings within the past 24 hours.  X-Ray Chest AP Portable 5/20/22:  FINDINGS:   Minimal blunting of the left costophrenic angle possibly atelectasis or scarring.  Trace pleural effusion not excluded.  Otherwise the lungs are clear, with normal appearance of pulmonary vasculature and no pleural effusion or pneumothorax.     The cardiac silhouette is normal in size. The hilar and mediastinal contours are unremarkable.   Degenerative change of the shoulders.     TTE complete 5/20/22:   · The left ventricle is normal in size with concentric hypertrophy and moderately decreased systolic function.  · Grade I left ventricular diastolic dysfunction.  · The estimated PA systolic pressure is 55 mmHg.  · Normal right ventricular size with normal right ventricular systolic function.  · There is pulmonary hypertension.  · Elevated central venous pressure (15 mmHg).  · The estimated ejection fraction is 30%.  · There are segmental left ventricular wall motion abnormalities.  · Mild aortic regurgitation.  · Mild mitral regurgitation.  · Mild tricuspid regurgitation.        ABG  No results for input(s): PH, PO2, PCO2, HCO3, BE in the last 168 hours.  Assessment/Plan:     Cardiac/Vascular  * Unstable angina  Tridil infusion per cards recs- will d/c if pt remains hypotensive  Pt states pain continues to improve   ECHO pending    Essential hypertension  On Tridil infusion  Cont BP meds if BP and HR within parameters      NSTEMI (non-ST elevated myocardial infarction)  Heparin  gtt per cardiology recs  Last trop 8.9, rpt pending  Scheduled for HC today  Continue ASA, b-blocker, statin, plavix; plan to hold b-blocker if pt remains bradycardic    Cardiology following      Endocrine  Type 2 diabetes mellitus without complication, without long-term current use of insulin  Glucose stable 135-141  Accuchecks q6  Low SSI    GI  Transaminitis  NSTEMI likely causative factor in LFT elevation  Improvement in latest labs  Rpt CMP in AM        Critical Care Daily Checklist:    A: Awake: RASS Goal/Actual Goal:    Actual: [place] Agitation Sedation Scale (RASS): Alert and calm   B: Spontaneous Breathing Trial Performed?     C: SAT & SBT Coordinated?  n/a                      D: Delirium: CAM-ICU Overall CAM-ICU: Negative   E: Early Mobility Performed? Reviewed   F: Feeding Goal:    Status:     Current Diet Order   Procedures    Diet Cardiac      AS: Analgesia/Sedation Reviewed   T: Thromboembolic Prophylaxis Heparin   H: HOB > 300 Yes   U: Stress Ulcer Prophylaxis (if needed) Pepcid   G: Glucose Control Low SSI   B: Bowel Function     I: Indwelling Catheter (Lines & [employer]) Necessity Reviewed   D: De-escalation of Antimicrobials/Pharmacotherapies Reviewed    Plan for the day/ETD See note    Code Status:  Family/Goals of Care: Full Code  Grandson at bedside- updated on plan of care.    I have discussed case and plan of care in detail with Dr. Diaz and MONIKA Miller; Status and plan of care were discussed with team on multidisciplinary rounds.  Critical Care Time: 60 minutes  Critical secondary to Patient has a condition that poses threat to life and bodily function: NSTEMI  Patient is currently on drug therapy requiring intensive monitoring for toxicity: Tridil, Heparin     Critical care was time spent personally by me on the following activities: development of treatment plan with patient or surrogate and bedside caregivers, discussions with consultants, evaluation of patient's response to treatment, examination of patient, ordering and performing treatments and interventions, ordering and review of laboratory studies, ordering and review of radiographic studies,  pulse oximetry, re-evaluation of patient's condition. This critical care time did not overlap with that of any other provider or involve time for any procedures.    Thank you for your consult. I will follow-up with patient. Please contact us if you have any additional questions.     Gilda Kowalski NP  Critical Care Medicine  [facility] ([facility])

## 2022-05-20 NOTE — HPI
Ms. Wood is an 81 year old female patient whose current medical conditions include chronic back pain, DM type II, GERD, hyperlipidemia, HTN, and history of prior CVA who presented to [facility] ED yesterday with a chief complaint of substernal, sharp chest pain that onset 4 hours PTA. Associated symptoms included SOB, diaphoresis, and nausea. Patient denied any associated vomiting, palpitations, lightheadedness, dizziness, near syncope, or syncope. Received nitro spray en route via EMS which helped alleviate pain. Initial workup revealed troponin of 2.331 and mildly bumped LFT's. EKG showed septal Q waves and ST changes in V2 and patient was subsequently admitted for further evaluation and treatment. Cardiology consulted to assist with management. Patient seen and examined today in ICU. Remains on Tridil infusion. Currently chest pain free. No other CV complaints. She reports compliance with her medications. Followed in clinic by Dr. Martins. Troponin 2.331>8.923, repeat pending. Echo ordered. [facility] planned today.

## 2022-05-20 NOTE — SUBJECTIVE & OBJECTIVE
Past Medical History:   Diagnosis Date    Arthritis     Cataract     GERD (gastroesophageal reflux disease)     Hyperlipidemia     Hypertension     Stroke        Past Surgical History:   Procedure Laterality Date    ADENOIDECTOMY      ADRENAL GLAND SURGERY      APPENDECTOMY      BACK SURGERY      fusion l 4-5 s 1,2,3  fusion l 2-3    EYE SURGERY      HEMORRHOID SURGERY      HERNIA REPAIR      HYSTERECTOMY      indirect lumbar decompression      percutaneous placement of interspinous extension blocker    TONSILLECTOMY         Review of patient's allergies indicates:   Allergen Reactions    Prazosin Other (See Comments)     dizziness    Amitriptyline     Hydrochlorothiazide      Causes muscle cramping    Lisinopril      hyperkalemia    Percocet [oxycodone-acetaminophen] Other (See Comments)     Seizures    Codeine Nausea Only and Rash       No current facility-administered medications on file prior to encounter.     Current Outpatient Medications on File Prior to Encounter   Medication Sig    aspirin (ECOTRIN) 81 MG EC tablet Take 81 mg by mouth once daily.    carvediloL (COREG) 6.25 MG tablet Take 1 tablet (6.25 mg total) by mouth 2 (two) times daily. TAKE (1) TABLET BY MOUTH 2 TIMES A DAY WITH MEALS    cloNIDine (CATAPRES) 0.1 MG tablet Take 1 tablet (0.1 mg total) by mouth 2 (two) times daily. To take an extra dose if BP >160/90    diclofenac sodium (VOLTAREN) 1 % Gel Apply 2 g topically 3 (three) times daily.    HYDROcodone-acetaminophen (NORCO)  mg per tablet Take 1 tablet by mouth daily as needed.    isosorbide mononitrate (IMDUR) 30 MG 24 hr tablet TAKE 1 TABLET BY MOUTH TWICE A DAY    traZODone (DESYREL) 50 MG tablet TAKE 1 TABLET BY MOUTH ONCE AT BEDTIME.    valsartan (DIOVAN) 160 MG tablet TAKE 1 TABLET BY MOUTH TWICE A DAY     Family History       Problem Relation (Age of Onset)    Breast cancer Sister    Diabetes Mother    Heart disease Mother    Hypertension Mother, Father    Kidney disease Father           Tobacco Use    Smoking status: Never Smoker    Smokeless tobacco: Never Used   Substance and Sexual Activity    Alcohol use: No    Drug use: No    Sexual activity: Not Currently     Review of Systems   Constitutional: Positive for diaphoresis and malaise/fatigue.   HENT: Negative.     Eyes: Negative.    Cardiovascular:  Positive for chest pain.   Respiratory: Negative.     Endocrine: Negative.    Hematologic/Lymphatic: Negative.    Skin: Negative.    Musculoskeletal: Negative.    Gastrointestinal: Negative.    Genitourinary: Negative.    Neurological: Negative.    Psychiatric/Behavioral: Negative.     Allergic/Immunologic: Negative.    Objective:     Vital Signs (Most Recent):  Temp: 98 °F (36.7 °C) (05/20/22 0315)  Pulse: (!) 56 (05/20/22 0515)  Resp: 18 (05/20/22 0515)  BP: 114/71 (05/20/22 0515)  SpO2: 99 % (05/20/22 0732)   Vital Signs (24h Range):  Temp:  [97.3 °F (36.3 °C)-98.1 °F (36.7 °C)] 98 °F (36.7 °C)  Pulse:  [56-79] 56  Resp:  [11-34] 18  SpO2:  [92 %-99 %] 99 %  BP: (114-213)/() 114/71     Weight: 60.3 kg (133 lb)  Body mass index is 25.13 kg/m².    SpO2: 99 %  O2 Device (Oxygen Therapy): nasal cannula      Intake/Output Summary (Last 24 hours) at 5/20/2022 0900  Last data filed at 5/20/2022 0600  Gross per 24 hour   Intake 627.41 ml   Output 45 ml   Net 582.41 ml       Lines/Drains/Airways       Drain  Duration             Female External Urinary Catheter 05/20/22 0130 <1 day              Peripheral Intravenous Line  Duration                  Peripheral IV - Single Lumen 05/19/22 2310 20 G Left Forearm <1 day         Peripheral IV - Single Lumen 05/19/22 2349 22 G Right Forearm <1 day         Peripheral IV - Single Lumen 05/20/22 0233 20 G Anterior;Left;Proximal Forearm <1 day                    Physical Exam  Vitals and nursing note reviewed.   Constitutional:       General: She is not in acute distress.     Appearance: She is well-developed. She is not diaphoretic.      Comments: On  supplemental O2   HENT:      Head: Normocephalic and atraumatic.   Eyes:      General:         Right eye: No discharge.         Left eye: No discharge.      Pupils: Pupils are equal, round, and reactive to light.   Neck:      Thyroid: No thyromegaly.      Vascular: No JVD.      Trachea: No tracheal deviation.   Cardiovascular:      Rate and Rhythm: Regular rhythm. Bradycardia present.      Heart sounds: Normal heart sounds, S1 normal and S2 normal. No murmur heard.  Pulmonary:      Effort: Pulmonary effort is normal. No respiratory distress.      Breath sounds: Normal breath sounds. No wheezing or rales.   Abdominal:      General: There is no distension.      Palpations: Abdomen is soft.      Tenderness: There is no rebound.   Musculoskeletal:      Cervical back: Neck supple.      Right lower leg: No edema.      Left lower leg: No edema.   Skin:     General: Skin is warm and dry.      Findings: No erythema.   Neurological:      General: No focal deficit present.      Mental Status: She is alert and oriented to person, place, and time.   Psychiatric:         Mood and Affect: Mood normal.         Behavior: Behavior normal.         Thought Content: Thought content normal.       Significant Labs: CMP   Recent Labs   Lab 05/19/22 2318 05/20/22  0510    139   K 4.3 4.4    104   CO2 21* 23   * 135*   BUN 27* 25*   CREATININE 1.1 1.0   CALCIUM 9.5 9.1   PROT 6.9 6.1   ALBUMIN 3.8 3.5   BILITOT 0.6 0.5   ALKPHOS 101 82   * 117*   ALT 67* 62*   ANIONGAP 13 12   ESTGFRAFRICA 54* >60   EGFRNONAA 47* 52*   , CBC   Recent Labs   Lab 05/19/22 2318 05/20/22  0510   WBC 9.22 9.84   HGB 13.8 12.4   HCT 42.2 39.1    230   , Troponin   Recent Labs   Lab 05/19/22 2318 05/20/22  0510   TROPONINI 2.331* 8.923*   , and All pertinent lab results from the last 24 hours have been reviewed.    Significant Imaging: Echocardiogram: Transthoracic echo (TTE) complete (Cupid Only):   Results for orders placed or  performed during the hospital encounter of 05/19/22   Echo   Result Value Ref Range    BSA 1.61 m2    TDI SEPTAL 0.03 m/s    LV LATERAL E/E' RATIO 12.50 m/s    LV SEPTAL E/E' RATIO 16.67 m/s    IVC diameter 2.27 cm    Left Ventricular Outflow Tract Mean Velocity 0.93210209961 cm/s    Left Ventricular Outflow Tract Mean Gradient 1.54 mmHg    TDI LATERAL 0.04 m/s    LVIDd 3.61 3.5 - 6.0 cm    IVS 1.54 (A) 0.6 - 1.1 cm    Posterior Wall 1.48 (A) 0.6 - 1.1 cm    Ao root annulus 2.68 cm    LVIDs 2.60 2.1 - 4.0 cm    FS 28 28 - 44 %    Sinus 2.55 cm    STJ 2.63 cm    Ascending aorta 2.39 cm    LV mass 203.93 g    LA size 3.92 cm    TAPSE 1.43 cm    Left Ventricle Relative Wall Thickness 0.82 cm    AV regurgitation pressure 1/2 time 492.975763268792075 ms    AV mean gradient 5 mmHg    AV valve area 1.57 cm2    AV Velocity Ratio 0.62     AV index (prosthetic) 0.64     MV mean gradient 1 mmHg    MV valve area by continuity eq 2.33 cm2    PV peak gradient 2.19 mmHg    E/A ratio 0.63     Mean e' 0.04 m/s    E wave deceleration time 291.030012928330764 msec    IVRT 125.048791323754435 msec    LVOT diameter 1.77 cm    LVOT area 2.5 cm2    LVOT peak mehrdad 0.93 m/s    LVOT peak VTI 24.40 cm    Ao peak mehrdad 1.50 m/s    Ao VTI 38.3 cm    RVOT peak mehrdad 0.74 m/s    RVOT peak VTI 19.0 cm    Mr max mehrdad 4.26 m/s    LVOT stroke volume 60.01 cm3    AV peak gradient 9 mmHg    MV peak gradient 2 mmHg    PV mean gradient 1.30 mmHg    E/E' ratio 14.29 m/s    MV Peak E Mehrdad 0.50 m/s    AR Max Mehrdad 3.66 m/s    TR Max Mehrdad 3.18 m/s    MV VTI 25.8 cm    MV Peak A Mehrdad 0.79 m/s    LV Systolic Volume 24.52 mL    LV Systolic Volume Index 15.4 mL/m2    LV Diastolic Volume 54.68 mL    LV Diastolic Volume Index 34.39 mL/m2    LV Mass Index 128 g/m2    RA Major Axis 4.03 cm    Left Atrium Minor Axis 5.00 cm    Left Atrium Major Axis 4.99 cm    Triscuspid Valve Regurgitation Peak Gradient 40 mmHg    RA Width 2.72 cm   , EKG: Reviewed, and X-Ray: CXR: X-Ray Chest  1 View (CXR): No results found for this visit on 05/19/22. and X-Ray Chest PA and Lateral (CXR): No results found for this visit on 05/19/22.

## 2022-05-20 NOTE — ASSESSMENT & PLAN NOTE
- Troponin 2.331. EKG showed septal Q waves and nonspecific ST-T abnormalities. CP free on admission.    - On Tridil drip.  - Continue heparin drip per nomogram.  - Plavix load given in the ED. Continue 75 mg Plavix daily. Continue daily ASA. Start metoprolol tartrate 25 mg TID and Lipitor 80 mg daily.     - Trend serial cardiac enzymes and EKG.  - Check FLP.  - Will obtain 2D echo.  - Cardiology consult. Will keep NPO after MN for LHC tomorrow.     See NSTEMI

## 2022-05-20 NOTE — ASSESSMENT & PLAN NOTE
-Presents with sharp substernal chest pain with associated SOB, diaphoresis  -Troponin 2.331>8.923, repeat pending  -Consistent with NSTEMI  -Stable during exam  -Continue ASA, Plavix, BB, statin, Tridil gtt  -Continue heparin gtt  -Check echo  -LHC today by Dr. Martins. All risks, benefits, and treatment alternatives explained to patient in detail. All questions answered. She has agreed to proceed. Further rec's to follow.

## 2022-05-20 NOTE — ASSESSMENT & PLAN NOTE
Tridil infusion per cards recs- will d/c if pt remains hypotensive  Pt states pain continues to improve   ECHO pending

## 2022-05-20 NOTE — HOSPITAL COURSE
5/20 admitted overnight for chest pain, unstable angina. Cardiology consulted and recommended LHC today. Nausea overnight. Chest pain improved. Does not wear supplemental oxygen at baseline. Has nerve stimulator for chronic back pain.  5/21 underwent LHC. 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 Overnight patient received anxiolytic and became confused. No family at bedside to discuss goals    After discussing with cardiology, ok to discharge home with adjustment to medication(s). Discussed with family at bedside plan with no additional concerns.     Patient seen and evaluated by me. Patient was determined to be suitable for d/c. Patient deemed stable for discharge to home with homehealth physical/occupational therapy and nurse practitioner to visit.

## 2022-05-20 NOTE — PLAN OF CARE
AAO, forgetful, independent patient. Tridil, heparin and NS continued overnight. VSS. BP controlled with scheduled metoprolol. Purewick in place. Possible echo and heart cath today. Daughter updated on POC.

## 2022-05-20 NOTE — ASSESSMENT & PLAN NOTE
- Patient with h/o statin intolerance. Starting on 80 mg Lipitor daily per Cardiology. FLP in AM.

## 2022-05-20 NOTE — ASSESSMENT & PLAN NOTE
- On Tridil drip. Hold home Imdur for now.   - Switching Coreg to PO Lopressor as above per Cardiology.   - Continue home clonidine.  - Hold valsartan due to CINTHIA.     5/20  On tridil gtt  Cardiology following

## 2022-05-20 NOTE — HOSPITAL COURSE
5/20- Pt arrived to ICU overnight. Currently on Tridil and Heparin infusions. NAD w/ 2/10 chest pain at this time. Vital signs stable. Angiogram planned for today  [facility] revealed normal coronary arteries; suspect takotsubo  Returned from [facility] again complaining of chest pain; given xanax after which complained of SOB (oxygenating well with no increased work of breathing) and became agitated with care and confused  5/21 - overnight weaned off tridil and no complaint of CP but remained mildly agitated and confused to situation and refused her oral medications. VSS off infusions, 2L NC oxygen sats %

## 2022-05-20 NOTE — ASSESSMENT & PLAN NOTE
- On Tridil drip. Hold home Imdur for now.   - Switching Coreg to PO Lopressor as above per Cardiology.   - Continue home clonidine.  - Hold valsartan due to CINTHIA.

## 2022-05-20 NOTE — PLAN OF CARE
[facility] - Intensive Care (Hospital)  Initial Discharge Assessment       Primary Care Provider: Angeles Carson MD    Admission Diagnosis: Unstable angina [I20.0]  Chest pain [R07.9]    Admission Date: 5/19/2022  Expected Discharge Date:     Discharge Barriers Identified: None    Payor: MEDICARE / Plan: MEDICARE PART A & B / Product Type: Government /     Extended Emergency Contact Information  Primary Emergency Contact: Vicki Zafar   [facility]  Home Phone: [phone]  Relation: Daughter  Secondary Emergency Contact: maeve zafar  Mobile Phone: [phone]  Relation: Grandchild  Preferred language: English   needed? No    Discharge Plan A: Home Health  Discharge Plan B: Home with family      [place] - [address] [facility]  [address] [address] [address]  Phone: [phone] Fax: [phone]      Initial Assessment (most recent)     Adult Discharge Assessment - 05/20/22 0958        Discharge Assessment    Assessment Type Discharge Planning Assessment     Confirmed/corrected address, phone number and insurance Yes     Confirmed Demographics Correct on Facesheet     Source of Information patient     When was your last doctors appointment? 11/17/21     Communicated JOSE with patient/caregiver Date not available/Unable to determine     Reason For Admission unstable angina     Lives With child(godwin), adult     Facility Arrived From: home     Do you expect to return to your current living situation? Yes     Do you have help at home or someone to help you manage your care at home? Yes     Who are your caregiver(s) and their phone number(s)? Vicki Zafar (Daughter)     Prior to hospitilization cognitive status: Alert/Oriented     Current cognitive status: Alert/Oriented     Walking or Climbing Stairs Difficulty ambulation difficulty, requires equipment     Mobility Management cane     Dressing/Bathing Difficulty none     Home Accessibility stairs to enter home      Number of Stairs, Main Entrance four     Home Layout Able to live on 1st floor     Equipment Currently Used at Home walker, rolling;cane, straight;bedside commode     Readmission within 30 days? No     Patient currently being followed by outpatient case management? No     Do you currently have service(s) that help you manage your care at home? No     Do you take prescription medications? Yes     Do you have prescription coverage? Yes     Do you have any problems affording any of your prescribed medications? No     Is the patient taking medications as prescribed? yes     Who is going to help you get home at discharge? Vicki Puentes (Daughter)     How do you get to doctors appointments? family or friend will provide     Are you on dialysis? No     Do you take coumadin? No     Discharge Plan A Home Health     Discharge Plan B Home with family     DME Needed Upon Discharge  none     Discharge Plan discussed with: Patient     Discharge Barriers Identified None        Relationship/Environment    Name(s) of Who Lives With Patient Vicki Puentes (Daughter)               Anticipated DC dispo: home health   Prior Level of Function: independent, intermittently uses a cane   PCP: Angeles Carson MD     Comments:  CM met with patient at bedside to introduce role and discuss discharge planning. Patient lives with her daughter who will also be help at home and can provide transport at time of discharge along with other family. Discussed possibility of home health at discharge. Patient agreeable to HH. Will discuss with attending. CM discharge needs depends on hospital progress. CM will continue following to assist with other needs.

## 2022-05-20 NOTE — ASSESSMENT & PLAN NOTE
- Diet controlled at home.  - Accuchecks with low dose SSI if needed.  - Diabetic diet.  - Check HbA1c.

## 2022-05-20 NOTE — ASSESSMENT & PLAN NOTE
- Creatinine 1.1 (baseline 0.8).  - Will give gentle IV hydration overnight to prepare for [facility] tomorrow.   - Hold home ARB for now.   - Follow labs.

## 2022-05-20 NOTE — ASSESSMENT & PLAN NOTE
Cardiology consulted  On heparin and tridil gtt  Awaiting [facility]  Continue aspirin, beta blocker, statin, plavix  Echocardiogram pending

## 2022-05-20 NOTE — HPI
History obtained from patient     81 year old female with PMHx of DM II, GERD, HLD, CVA, and HTN who presented to [facility] ED on the night of  for nonradiating substernal/left sided chest pain which began shortly after attending a  yesterday afternoon along with intermittent SOB, nausea, and three episodes of vomiting PTA. 325 mg ASA and 5 sprays of nitro spray administered en route by EMS which pt states relieved most of her symptoms.   Initial ED workup noted EKG with septal Q waves and nonspecific ST-T abnormalities; trop 2.331, BNP 59, creatinine 1.1, BUN 27. CXR unremarkable. Placed on Heparin and Tridil infusion per cardiology recommendation. Critical care consulted for unstable angina with need for Heparin/Nitro infusions.

## 2022-05-21 VITALS
OXYGEN SATURATION: 96 % | BODY MASS INDEX: 25.48 KG/M2 | DIASTOLIC BLOOD PRESSURE: 51 MMHG | TEMPERATURE: 99 F | HEART RATE: 64 BPM | WEIGHT: 134.94 LBS | SYSTOLIC BLOOD PRESSURE: 99 MMHG | HEIGHT: 61 IN | RESPIRATION RATE: 20 BRPM

## 2022-05-21 PROBLEM — R41.82 ALTERED MENTAL STATE: Status: ACTIVE | Noted: 2022-05-21

## 2022-05-21 PROBLEM — I21.4 NSTEMI (NON-ST ELEVATED MYOCARDIAL INFARCTION): Status: RESOLVED | Noted: 2022-05-20 | Resolved: 2022-05-21

## 2022-05-21 PROBLEM — R41.82 ALTERED MENTAL STATE: Status: RESOLVED | Noted: 2022-05-21 | Resolved: 2022-05-21

## 2022-05-21 PROBLEM — N17.9 AKI (ACUTE KIDNEY INJURY): Status: RESOLVED | Noted: 2022-05-20 | Resolved: 2022-05-21

## 2022-05-21 PROBLEM — I51.81 STRESS-INDUCED CARDIOMYOPATHY: Status: ACTIVE | Noted: 2022-05-21

## 2022-05-21 PROBLEM — I20.0 UNSTABLE ANGINA: Status: RESOLVED | Noted: 2022-05-20 | Resolved: 2022-05-21

## 2022-05-21 PROBLEM — R74.01 TRANSAMINITIS: Status: RESOLVED | Noted: 2022-05-20 | Resolved: 2022-05-21

## 2022-05-21 LAB
ALBUMIN SERPL BCP-MCNC: 3.4 G/DL (ref 3.5–5.2)
ALP SERPL-CCNC: 69 U/L (ref 55–135)
ALT SERPL W/O P-5'-P-CCNC: 43 U/L (ref 10–44)
ANION GAP SERPL CALC-SCNC: 12 MMOL/L (ref 8–16)
ANION GAP SERPL CALC-SCNC: 12 MMOL/L (ref 8–16)
ANISOCYTOSIS BLD QL SMEAR: SLIGHT
AST SERPL-CCNC: 74 U/L (ref 10–40)
BASOPHILS # BLD AUTO: 0.06 K/UL (ref 0–0.2)
BASOPHILS NFR BLD: 0.4 % (ref 0–1.9)
BILIRUB SERPL-MCNC: 0.8 MG/DL (ref 0.1–1)
BUN SERPL-MCNC: 16 MG/DL (ref 8–23)
BUN SERPL-MCNC: 16 MG/DL (ref 8–23)
CALCIUM SERPL-MCNC: 8.7 MG/DL (ref 8.7–10.5)
CALCIUM SERPL-MCNC: 8.7 MG/DL (ref 8.7–10.5)
CHLORIDE SERPL-SCNC: 104 MMOL/L (ref 95–110)
CHLORIDE SERPL-SCNC: 104 MMOL/L (ref 95–110)
CO2 SERPL-SCNC: 25 MMOL/L (ref 23–29)
CO2 SERPL-SCNC: 25 MMOL/L (ref 23–29)
CREAT SERPL-MCNC: 0.8 MG/DL (ref 0.5–1.4)
CREAT SERPL-MCNC: 0.8 MG/DL (ref 0.5–1.4)
DIFFERENTIAL METHOD: ABNORMAL
EOSINOPHIL # BLD AUTO: 0 K/UL (ref 0–0.5)
EOSINOPHIL NFR BLD: 0.3 % (ref 0–8)
ERYTHROCYTE [DISTWIDTH] IN BLOOD BY AUTOMATED COUNT: 13.4 % (ref 11.5–14.5)
EST. GFR  (AFRICAN AMERICAN): >60 ML/MIN/1.73 M^2
EST. GFR  (AFRICAN AMERICAN): >60 ML/MIN/1.73 M^2
EST. GFR  (NON AFRICAN AMERICAN): >60 ML/MIN/1.73 M^2
EST. GFR  (NON AFRICAN AMERICAN): >60 ML/MIN/1.73 M^2
GLUCOSE SERPL-MCNC: 105 MG/DL (ref 70–110)
GLUCOSE SERPL-MCNC: 105 MG/DL (ref 70–110)
HCT VFR BLD AUTO: 40.4 % (ref 37–48.5)
HGB BLD-MCNC: 12.7 G/DL (ref 12–16)
IMM GRANULOCYTES # BLD AUTO: 0.06 K/UL (ref 0–0.04)
IMM GRANULOCYTES NFR BLD AUTO: 0.4 % (ref 0–0.5)
LYMPHOCYTES # BLD AUTO: 2.1 K/UL (ref 1–4.8)
LYMPHOCYTES NFR BLD: 14.8 % (ref 18–48)
MCH RBC QN AUTO: 29.7 PG (ref 27–31)
MCHC RBC AUTO-ENTMCNC: 31.4 G/DL (ref 32–36)
MCV RBC AUTO: 94 FL (ref 82–98)
MONOCYTES # BLD AUTO: 2.2 K/UL (ref 0.3–1)
MONOCYTES NFR BLD: 15.3 % (ref 4–15)
NEUTROPHILS # BLD AUTO: 9.7 K/UL (ref 1.8–7.7)
NEUTROPHILS NFR BLD: 68.8 % (ref 38–73)
NRBC BLD-RTO: 0 /100 WBC
OVALOCYTES BLD QL SMEAR: ABNORMAL
PLATELET # BLD AUTO: 184 K/UL (ref 150–450)
PLATELET BLD QL SMEAR: ABNORMAL
PMV BLD AUTO: 9.4 FL (ref 9.2–12.9)
POCT GLUCOSE: 122 MG/DL (ref 70–110)
POIKILOCYTOSIS BLD QL SMEAR: SLIGHT
POTASSIUM SERPL-SCNC: 4.4 MMOL/L (ref 3.5–5.1)
POTASSIUM SERPL-SCNC: 4.4 MMOL/L (ref 3.5–5.1)
PROT SERPL-MCNC: 6 G/DL (ref 6–8.4)
RBC # BLD AUTO: 4.28 M/UL (ref 4–5.4)
SODIUM SERPL-SCNC: 141 MMOL/L (ref 136–145)
SODIUM SERPL-SCNC: 141 MMOL/L (ref 136–145)
WBC # BLD AUTO: 14.09 K/UL (ref 3.9–12.7)

## 2022-05-21 PROCEDURE — 99233 PR SUBSEQUENT HOSPITAL CARE,LEVL III: ICD-10-PCS | Mod: ,,, | Performed by: NURSE PRACTITIONER

## 2022-05-21 PROCEDURE — 25000003 PHARM REV CODE 250: Performed by: INTERNAL MEDICINE

## 2022-05-21 PROCEDURE — 80053 COMPREHEN METABOLIC PANEL: CPT | Performed by: NURSE PRACTITIONER

## 2022-05-21 PROCEDURE — 99233 SBSQ HOSP IP/OBS HIGH 50: CPT | Mod: ,,, | Performed by: INTERNAL MEDICINE

## 2022-05-21 PROCEDURE — 27000221 HC OXYGEN, UP TO 24 HOURS

## 2022-05-21 PROCEDURE — 85025 COMPLETE CBC W/AUTO DIFF WBC: CPT | Performed by: INTERNAL MEDICINE

## 2022-05-21 PROCEDURE — 36415 COLL VENOUS BLD VENIPUNCTURE: CPT | Performed by: NURSE PRACTITIONER

## 2022-05-21 PROCEDURE — 99233 SBSQ HOSP IP/OBS HIGH 50: CPT | Mod: ,,, | Performed by: NURSE PRACTITIONER

## 2022-05-21 PROCEDURE — 99233 PR SUBSEQUENT HOSPITAL CARE,LEVL III: ICD-10-PCS | Mod: ,,, | Performed by: INTERNAL MEDICINE

## 2022-05-21 PROCEDURE — 25000003 PHARM REV CODE 250: Performed by: FAMILY MEDICINE

## 2022-05-21 RX ORDER — NITROGLYCERIN 0.4 MG/1
0.4 TABLET SUBLINGUAL EVERY 5 MIN PRN
Qty: 7 TABLET | Refills: 0 | Status: ON HOLD | OUTPATIENT
Start: 2022-05-21 | End: 2022-05-24

## 2022-05-21 RX ORDER — CLOPIDOGREL BISULFATE 75 MG/1
75 TABLET ORAL DAILY
Qty: 30 TABLET | Refills: 0 | Status: SHIPPED | OUTPATIENT
Start: 2022-05-22 | End: 2022-06-06

## 2022-05-21 RX ORDER — ATORVASTATIN CALCIUM 80 MG/1
80 TABLET, FILM COATED ORAL NIGHTLY
Qty: 30 TABLET | Refills: 0 | Status: SHIPPED | OUTPATIENT
Start: 2022-05-21 | End: 2022-06-17 | Stop reason: SDUPTHER

## 2022-05-21 RX ORDER — FUROSEMIDE 20 MG/1
20 TABLET ORAL DAILY
Qty: 30 TABLET | Refills: 11 | Status: SHIPPED | OUTPATIENT
Start: 2022-05-21 | End: 2022-05-25

## 2022-05-21 RX ORDER — METOPROLOL TARTRATE 25 MG/1
25 TABLET, FILM COATED ORAL 3 TIMES DAILY
Qty: 90 TABLET | Refills: 0 | Status: SHIPPED | OUTPATIENT
Start: 2022-05-21 | End: 2022-05-25

## 2022-05-21 RX ADMIN — ASPIRIN 81 MG: 81 TABLET, COATED ORAL at 08:05

## 2022-05-21 RX ADMIN — MUPIROCIN: 20 OINTMENT TOPICAL at 08:05

## 2022-05-21 RX ADMIN — FAMOTIDINE 20 MG: 20 TABLET ORAL at 08:05

## 2022-05-21 RX ADMIN — METOPROLOL TARTRATE 25 MG: 25 TABLET, FILM COATED ORAL at 08:05

## 2022-05-21 RX ADMIN — CLOPIDOGREL 75 MG: 75 TABLET, FILM COATED ORAL at 08:05

## 2022-05-21 NOTE — ASSESSMENT & PLAN NOTE
- Troponin 2.331. EKG showed septal Q waves and nonspecific ST-T abnormalities. CP free on admission.    - On Tridil drip.  - Continue heparin drip per nomogram.  - Plavix load given in the ED. Continue 75 mg Plavix daily. Continue daily ASA. Start metoprolol tartrate 25 mg TID and Lipitor 80 mg daily.     - Trend serial cardiac enzymes and EKG.  - Check FLP.  - Will obtain 2D echo.  - Cardiology consult. Will keep NPO after MN for C tomorrow.     See NSTEMI    5/21  Concerns for stress cardiomyopathy   Troponin(s) remain elevated

## 2022-05-21 NOTE — PROGRESS NOTES
[facility] ([facility])  Cardiology  Progress Note    Patient Name: Leslie Wood  MRN: [id number]  Admission Date: 5/19/2022  Hospital Length of Stay: 1 days  Code Status: Full Code   Attending Physician: Daniel Richey MD   Primary Care Physician: Angeles Carson MD  Expected Discharge Date: 5/21/2022  Principal Problem:Unstable angina    Subjective:     Hospital Course:   Ms. Wood is an 81 year old female patient whose current medical conditions include chronic back pain, DM type II, GERD, hyperlipidemia, HTN, and history of prior CVA who presented to [facility] ED yesterday with a chief complaint of substernal, sharp chest pain that onset 4 hours PTA. Associated symptoms included SOB, diaphoresis, and nausea. Patient denied any associated vomiting, palpitations, lightheadedness, dizziness, near syncope, or syncope. Received nitro spray en route via EMS which helped alleviate pain. Initial workup revealed troponin of 2.331 and mildly bumped LFT's. EKG showed septal Q waves and ST changes in V2 and patient was subsequently admitted for further evaluation and treatment. Cardiology consulted to assist with management. Patient seen and examined today in ICU. Remains on Tridil infusion. Currently chest pain free. No other CV complaints. She reports compliance with her medications. Followed in clinic by Dr. Martins. Troponin 2.331>8.923, repeat pending. Echo ordered. [facility] planned today.    5.21.2022  Coronaries were normal ,  access site is normal   No more chest pain  Did not tolerate the xanax yesterday was confused post dose   She denies any chest pain today   Mild ICU delirium yesterday, resolved with conservative management       Past Medical History:   Diagnosis Date    Arthritis     Cataract     GERD (gastroesophageal reflux disease)     Hyperlipidemia     Hypertension     Stroke        Past Surgical History:   Procedure Laterality Date    ADENOIDECTOMY      ADRENAL GLAND SURGERY       APPENDECTOMY      BACK SURGERY      fusion l 4-5 s 1,2,3  fusion l 2-3    EYE SURGERY      HEMORRHOID SURGERY      HERNIA REPAIR      HYSTERECTOMY      indirect lumbar decompression      percutaneous placement of interspinous extension blocker    TONSILLECTOMY         Review of patient's allergies indicates:   Allergen Reactions    Prazosin Other (See Comments)     dizziness    Amitriptyline     Hydrochlorothiazide      Causes muscle cramping    Lisinopril      hyperkalemia    Percocet [oxycodone-acetaminophen] Other (See Comments)     Seizures    Codeine Nausea Only and Rash       No current facility-administered medications on file prior to encounter.     Current Outpatient Medications on File Prior to Encounter   Medication Sig    aspirin (ECOTRIN) 81 MG EC tablet Take 81 mg by mouth once daily.    diclofenac sodium (VOLTAREN) 1 % Gel Apply 2 g topically 3 (three) times daily.    traZODone (DESYREL) 50 MG tablet TAKE 1 TABLET BY MOUTH ONCE AT BEDTIME.    [DISCONTINUED] carvediloL (COREG) 6.25 MG tablet Take 1 tablet (6.25 mg total) by mouth 2 (two) times daily. TAKE (1) TABLET BY MOUTH 2 TIMES A DAY WITH MEALS    [DISCONTINUED] cloNIDine (CATAPRES) 0.1 MG tablet Take 1 tablet (0.1 mg total) by mouth 2 (two) times daily. To take an extra dose if BP >160/90    [DISCONTINUED] HYDROcodone-acetaminophen (NORCO)  mg per tablet Take 1 tablet by mouth daily as needed.    [DISCONTINUED] isosorbide mononitrate (IMDUR) 30 MG 24 hr tablet TAKE 1 TABLET BY MOUTH TWICE A DAY    [DISCONTINUED] valsartan (DIOVAN) 160 MG tablet TAKE 1 TABLET BY MOUTH TWICE A DAY     Family History       Problem Relation (Age of Onset)    Breast cancer Sister    Diabetes Mother    Heart disease Mother    Hypertension Mother, Father    Kidney disease Father          Tobacco Use    Smoking status: Never Smoker    Smokeless tobacco: Never Used   Substance and Sexual Activity    Alcohol use: No    Drug use: No    Sexual  activity: Not Currently     Review of Systems   Constitutional: Positive for diaphoresis and malaise/fatigue.   HENT: Negative.     Eyes: Negative.    Cardiovascular:  Positive for chest pain.   Respiratory: Negative.     Endocrine: Negative.    Hematologic/Lymphatic: Negative.    Skin: Negative.    Musculoskeletal: Negative.    Gastrointestinal: Negative.    Genitourinary: Negative.    Neurological: Negative.    Psychiatric/Behavioral: Negative.     Allergic/Immunologic: Negative.    Objective:     Vital Signs (Most Recent):  Temp: 99 °F (37.2 °C) (05/21/22 0700)  Pulse: 64 (05/21/22 1415)  Resp: 20 (05/21/22 1415)  BP: (!) 99/51 (05/21/22 1100)  SpO2: 96 % (05/21/22 1415)   Vital Signs (24h Range):  Temp:  [99 °F (37.2 °C)-100.6 °F (38.1 °C)] 99 °F (37.2 °C)  Pulse:  [55-95] 64  Resp:  [18-54] 20  SpO2:  [86 %-100 %] 96 %  BP: ()/(50-92) 99/51     Weight: 61.2 kg (134 lb 14.7 oz)  Body mass index is 25.49 kg/m².    SpO2: 96 %  O2 Device (Oxygen Therapy): nasal cannula      Intake/Output Summary (Last 24 hours) at 5/21/2022 1437  Last data filed at 5/21/2022 1000  Gross per 24 hour   Intake 561.95 ml   Output 1850 ml   Net -1288.05 ml         Lines/Drains/Airways       Drain  Duration             Female External Urinary Catheter 05/20/22 0130 1 day              Peripheral Intravenous Line  Duration                  Peripheral IV - Single Lumen 05/19/22 2349 22 G Right Forearm 1 day         Peripheral IV - Single Lumen 05/20/22 0700 22 G Anterior;Right Forearm 1 day                    Physical Exam  Vitals and nursing note reviewed.   Constitutional:       General: She is not in acute distress.     Appearance: She is well-developed. She is not diaphoretic.      Comments: On supplemental O2   HENT:      Head: Normocephalic and atraumatic.   Eyes:      General:         Right eye: No discharge.         Left eye: No discharge.      Pupils: Pupils are equal, round, and reactive to light.   Neck:      Thyroid: No  thyromegaly.      Vascular: No JVD.      Trachea: No tracheal deviation.   Cardiovascular:      Rate and Rhythm: Regular rhythm. Bradycardia present.      Heart sounds: Normal heart sounds, S1 normal and S2 normal. No murmur heard.  Pulmonary:      Effort: Pulmonary effort is normal. No respiratory distress.      Breath sounds: Normal breath sounds. No wheezing or rales.   Abdominal:      General: There is no distension.      Palpations: Abdomen is soft.      Tenderness: There is no rebound.   Musculoskeletal:      Cervical back: Neck supple.      Right lower leg: No edema.      Left lower leg: No edema.   Skin:     General: Skin is warm and dry.      Findings: No erythema.   Neurological:      General: No focal deficit present.      Mental Status: She is alert and oriented to person, place, and time.   Psychiatric:         Mood and Affect: Mood normal.         Behavior: Behavior normal.         Thought Content: Thought content normal.       Significant Labs: CMP   Recent Labs   Lab 05/19/22 2318 05/20/22 0510 05/21/22  0741    139 141  141   K 4.3 4.4 4.4  4.4    104 104  104   CO2 21* 23 25  25   * 135* 105  105   BUN 27* 25* 16  16   CREATININE 1.1 1.0 0.8  0.8   CALCIUM 9.5 9.1 8.7  8.7   PROT 6.9 6.1 6.0   ALBUMIN 3.8 3.5 3.4*   BILITOT 0.6 0.5 0.8   ALKPHOS 101 82 69   * 117* 74*   ALT 67* 62* 43   ANIONGAP 13 12 12  12   ESTGFRAFRICA 54* >60 >60  >60   EGFRNONAA 47* 52* >60  >60     , CBC   Recent Labs   Lab 05/19/22 2318 05/20/22  0510 05/21/22  0741   WBC 9.22 9.84 14.09*   HGB 13.8 12.4 12.7   HCT 42.2 39.1 40.4    230 184     , Troponin   Recent Labs   Lab 05/19/22 2318 05/20/22  0510 05/20/22  1250   TROPONINI 2.331* 8.923* 7.262*     , and All pertinent lab results from the last 24 hours have been reviewed.    Significant Imaging: Echocardiogram: Transthoracic echo (TTE) complete ([facility] Only):   Results for orders placed or performed during the hospital  encounter of 05/19/22   Echo   Result Value Ref Range    BSA 1.61 m2    TDI SEPTAL 0.03 m/s    LV LATERAL E/E' RATIO 12.50 m/s    LV SEPTAL E/E' RATIO 16.67 m/s    IVC diameter 2.27 cm    Left Ventricular Outflow Tract Mean Velocity 0.78357332746 cm/s    Left Ventricular Outflow Tract Mean Gradient 1.54 mmHg    TDI LATERAL 0.04 m/s    LVIDd 3.61 3.5 - 6.0 cm    IVS 1.30 (A) 0.6 - 1.1 cm    Posterior Wall 1.30 (A) 0.6 - 1.1 cm    Ao root annulus 2.68 cm    LVIDs 2.60 2.1 - 4.0 cm    FS 28 28 - 44 %    Sinus 2.55 cm    STJ 2.63 cm    Ascending aorta 2.39 cm    LV mass 160.71 g    LA size 3.92 cm    TAPSE 1.43 cm    Left Ventricle Relative Wall Thickness 0.72 cm    AV regurgitation pressure 1/2 time 492.949107889704842 ms    AV mean gradient 5 mmHg    AV valve area 1.57 cm2    AV Velocity Ratio 0.62     AV index (prosthetic) 0.64     MV mean gradient 1 mmHg    MV valve area by continuity eq 2.33 cm2    E/A ratio 0.63     Mean e' 0.04 m/s    E wave deceleration time 291.954289682918915 msec    IVRT 125.225474005912657 msec    LVOT diameter 1.77 cm    LVOT area 2.5 cm2    LVOT peak mehrdad 0.93 m/s    LVOT peak VTI 24.40 cm    Ao peak mehrdad 1.50 m/s    Ao VTI 38.3 cm    RVOT peak mehrdad 0.74 m/s    RVOT peak VTI 19.0 cm    Mr max mehrdad 4.26 m/s    LVOT stroke volume 60.01 cm3    AV peak gradient 9 mmHg    MV peak gradient 2 mmHg    PV mean gradient 1.30 mmHg    E/E' ratio 14.29 m/s    MV Peak E Mehrdad 0.50 m/s    AR Max Mehrdad 3.66 m/s    TR Max Mehrdad 3.18 m/s    MV VTI 25.8 cm    MV Peak A Mehrdad 0.79 m/s    LV Systolic Volume 24.52 mL    LV Systolic Volume Index 15.4 mL/m2    LV Diastolic Volume 54.68 mL    LV Diastolic Volume Index 34.39 mL/m2    LV Mass Index 101 g/m2    RA Major Axis 4.03 cm    Left Atrium Minor Axis 5.00 cm    Left Atrium Major Axis 4.99 cm    Triscuspid Valve Regurgitation Peak Gradient 40 mmHg    RA Width 2.72 cm    Right Atrial Pressure (from IVC) 15 mmHg    EF 30 %    TV rest pulmonary artery pressure 55 mmHg     Narrative    · The left ventricle is normal in size with concentric hypertrophy and   moderately decreased systolic function.  · Grade I left ventricular diastolic dysfunction.  · The estimated PA systolic pressure is 55 mmHg.  · Normal right ventricular size with normal right ventricular systolic   function.  · There is pulmonary hypertension.  · Elevated central venous pressure (15 mmHg).  · The estimated ejection fraction is 30%.  · There are segmental left ventricular wall motion abnormalities.  · Mild aortic regurgitation.  · Mild mitral regurgitation.  · Mild tricuspid regurgitation.      , EKG: Reviewed, and X-Ray: CXR: X-Ray Chest 1 View (CXR): No results found for this visit on 05/19/22. and X-Ray Chest PA and Lateral (CXR): No results found for this visit on 05/19/22.    Results for orders placed during the hospital encounter of 05/19/22    Cardiac catheterization    Conclusion  · The left ventricular end diastolic pressure was elevated.  · The pre-procedure left ventricular end diastolic pressure was 18.  · The ejection fraction was calculated to be 40%.  · The estimated blood loss was none.  · The coronary arteries were normal..  · The filling pressures on the left were mildly elevated.  · Possible atypical form of stress cardiomyopathy likely induced by her fall    Assessment and Plan:         Stress-induced cardiomyopathy  WITH ANTERIOR WALL HYPOKINESIS TO AKINESIS   CW TOPROL   NITRO PRN ON DISCHARGE  CW DAPT   GIVEN MILD DELIRIUM ON TOP OF HER DEMENTIA AND GIVEN STABLIZATION AND RESOLUTION OF CARDIAC SYMPTOMS   OK TO DC HOME   BP ON LOW SIDE CANNOT TOLERATE HIGHER DOSES OF MEDS   PO LASIX ALSO ON DISCHARGE    Type 2 diabetes mellitus without complication, without long-term current use of insulin  -Mgmt as per primary team    Hyperlipidemia LDL goal <100  -Statin therapy    Essential hypertension  -Continue BB, Clonidine  -On Tridil gtt  -Monitor BP drip        VTE Risk Mitigation (From admission,  onward)         Ordered     IP VTE HIGH RISK PATIENT  Once         05/20/22 0034     Place sequential compression device  Until discontinued         05/20/22 0034                Domingo Martins MD  Cardiology  [facility] ([facility])

## 2022-05-21 NOTE — PLAN OF CARE
Pt confused and restless overnight. Refused to take PO medications. Eventually did agree to take prn trazodone. 2 L NC. NSR on monitor. Nitro drip weaned off; pt no longer claiming chest pain. Purewick in place with adequate output.     Problem: Adult Inpatient Plan of Care  Goal: Plan of Care Review  Outcome: Ongoing, Progressing  Goal: Absence of Hospital-Acquired Illness or Injury  Outcome: Ongoing, Progressing

## 2022-05-21 NOTE — PROGRESS NOTES
DC instructions reviewed with patient and daughters. All Ivs removed. Wheeled in wheelchair to family vehicle without incident. Pt in possession of belongings.

## 2022-05-21 NOTE — HOSPITAL COURSE
Ms. Wood is an 81 year old female patient whose current medical conditions include chronic back pain, DM type II, GERD, hyperlipidemia, HTN, and history of prior CVA who presented to [facility] ED yesterday with a chief complaint of substernal, sharp chest pain that onset 4 hours PTA. Associated symptoms included SOB, diaphoresis, and nausea. Patient denied any associated vomiting, palpitations, lightheadedness, dizziness, near syncope, or syncope. Received nitro spray en route via EMS which helped alleviate pain. Initial workup revealed troponin of 2.331 and mildly bumped LFT's. EKG showed septal Q waves and ST changes in V2 and patient was subsequently admitted for further evaluation and treatment. Cardiology consulted to assist with management. Patient seen and examined today in ICU. Remains on Tridil infusion. Currently chest pain free. No other CV complaints. She reports compliance with her medications. Followed in clinic by Dr. Martins. Troponin 2.331>8.923, repeat pending. Echo ordered. [facility] planned today.    5.21.2022  Coronaries were normal ,  access site is normal   No more chest pain  Did not tolerate the xanax yesterday was confused post dose   She denies any chest pain today   Mild ICU delirium yesterday, resolved with conservative management

## 2022-05-21 NOTE — ASSESSMENT & PLAN NOTE
Cardiology consulted  On heparin and tridil gtt  Awaiting [facility]  Continue aspirin, beta blocker, statin, plavix  Echocardiogram pending    Troponin(s) remain elevated  Cardiology following  Awaiting further recommendations

## 2022-05-21 NOTE — SUBJECTIVE & OBJECTIVE
Review of Systems   Reason unable to perform ROS: limited with mild confusion/agitation.   Constitutional:  Negative for chills and fever.   HENT:  Negative for sore throat.    Respiratory:  Negative for shortness of breath.    Cardiovascular:  Negative for chest pain.   Gastrointestinal:  Negative for nausea and vomiting.   Skin: Negative.    Neurological:  Negative for focal weakness.   Psychiatric/Behavioral:  The patient is nervous/anxious.        Objective:     Vital Signs (Most Recent):  Temp: (!) 100.4 °F (38 °C) (05/21/22 0315)  Pulse: 68 (05/21/22 0700)  Resp: 19 (05/21/22 0700)  BP: (!) 93/50 (05/21/22 0700)  SpO2: 98 % (05/21/22 0700)   Vital Signs (24h Range):  Temp:  [100.2 °F (37.9 °C)-100.6 °F (38.1 °C)] 100.4 °F (38 °C)  Pulse:  [45-88] 68  Resp:  [12-54] 19  SpO2:  [90 %-100 %] 98 %  BP: ()/(50-92) 93/50     Weight: 61.2 kg (134 lb 14.7 oz)  Body mass index is 25.49 kg/m².      Intake/Output Summary (Last 24 hours) at 5/21/2022 0722  Last data filed at 5/21/2022 0000  Gross per 24 hour   Intake 1318.3 ml   Output 2050 ml   Net -731.7 ml      nitroGLYCERIN Stopped (05/20/22 2108)       Physical Exam  Vitals and nursing note reviewed.   Constitutional:       General: She is not in acute distress.     Interventions: Nasal cannula in place.   HENT:      Head: Atraumatic.   Eyes:      Conjunctiva/sclera: Conjunctivae normal.   Cardiovascular:      Rate and Rhythm: Normal rate and regular rhythm.      Pulses:           Radial pulses are 2+ on the right side and 2+ on the left side.        Dorsalis pedis pulses are 2+ on the right side and 2+ on the left side.   Pulmonary:      Effort: Pulmonary effort is normal.      Breath sounds: Normal breath sounds.   Abdominal:      General: Bowel sounds are normal. There is no distension.      Palpations: Abdomen is soft.   Musculoskeletal:      Right lower leg: No edema.      Left lower leg: No edema.   Skin:     General: Skin is warm and dry.      Capillary  Refill: Capillary refill takes less than 2 seconds.      Comments: Bruising to left forearm  Mild bruising around bandage to rt groin, no hematoma, drainage or induration   Neurological:      Mental Status: She is alert.      GCS: GCS eye subscore is 4. GCS verbal subscore is 4. GCS motor subscore is 6.   Psychiatric:         Mood and Affect: Mood is anxious. Affect is angry.         Speech: Speech normal.         Behavior: Behavior is agitated.         Thought Content: Thought content is paranoid.         Cognition and Memory: She exhibits impaired recent memory.         Judgment: Judgment is impulsive.       Vents:  Oxygen Concentration (%): 28 (05/21/22 0705)    Lines/Drains/Airways       Drain  Duration             Female External Urinary Catheter 05/20/22 0130 1 day              Peripheral Intravenous Line  Duration                  Peripheral IV - Single Lumen 05/19/22 2349 22 G Right Forearm 1 day         Peripheral IV - Single Lumen 05/20/22 0700 22 G Anterior;Right Forearm 1 day                    Significant Labs:    CBC/Anemia Profile:  Recent Labs   Lab 05/19/22  2318 05/20/22  0510   WBC 9.22 9.84   HGB 13.8 12.4   HCT 42.2 39.1    230   MCV 89 93   RDW 13.2 13.2        Chemistries:  Recent Labs   Lab 05/19/22  2318 05/20/22  0510    139   K 4.3 4.4    104   CO2 21* 23   BUN 27* 25*   CREATININE 1.1 1.0   CALCIUM 9.5 9.1   ALBUMIN 3.8 3.5   PROT 6.9 6.1   BILITOT 0.6 0.5   ALKPHOS 101 82   ALT 67* 62*   * 117*   MG  --  1.8       All pertinent labs within the past 24 hours have been reviewed.    Significant Imaging:  I have reviewed all pertinent imaging results/findings within the past 24 hours.

## 2022-05-21 NOTE — SUBJECTIVE & OBJECTIVE
Past Medical History:   Diagnosis Date    Arthritis     Cataract     GERD (gastroesophageal reflux disease)     Hyperlipidemia     Hypertension     Stroke        Past Surgical History:   Procedure Laterality Date    ADENOIDECTOMY      ADRENAL GLAND SURGERY      APPENDECTOMY      BACK SURGERY      fusion l 4-5 s 1,2,3  fusion l 2-3    EYE SURGERY      HEMORRHOID SURGERY      HERNIA REPAIR      HYSTERECTOMY      indirect lumbar decompression      percutaneous placement of interspinous extension blocker    TONSILLECTOMY         Review of patient's allergies indicates:   Allergen Reactions    Prazosin Other (See Comments)     dizziness    Amitriptyline     Hydrochlorothiazide      Causes muscle cramping    Lisinopril      hyperkalemia    Percocet [oxycodone-acetaminophen] Other (See Comments)     Seizures    Codeine Nausea Only and Rash       No current facility-administered medications on file prior to encounter.     Current Outpatient Medications on File Prior to Encounter   Medication Sig    aspirin (ECOTRIN) 81 MG EC tablet Take 81 mg by mouth once daily.    diclofenac sodium (VOLTAREN) 1 % Gel Apply 2 g topically 3 (three) times daily.    traZODone (DESYREL) 50 MG tablet TAKE 1 TABLET BY MOUTH ONCE AT BEDTIME.    [DISCONTINUED] carvediloL (COREG) 6.25 MG tablet Take 1 tablet (6.25 mg total) by mouth 2 (two) times daily. TAKE (1) TABLET BY MOUTH 2 TIMES A DAY WITH MEALS    [DISCONTINUED] cloNIDine (CATAPRES) 0.1 MG tablet Take 1 tablet (0.1 mg total) by mouth 2 (two) times daily. To take an extra dose if BP >160/90    [DISCONTINUED] HYDROcodone-acetaminophen (NORCO)  mg per tablet Take 1 tablet by mouth daily as needed.    [DISCONTINUED] isosorbide mononitrate (IMDUR) 30 MG 24 hr tablet TAKE 1 TABLET BY MOUTH TWICE A DAY    [DISCONTINUED] valsartan (DIOVAN) 160 MG tablet TAKE 1 TABLET BY MOUTH TWICE A DAY     Family History       Problem Relation (Age of Onset)    Breast cancer Sister    Diabetes Mother     Heart disease Mother    Hypertension Mother, Father    Kidney disease Father          Tobacco Use    Smoking status: Never Smoker    Smokeless tobacco: Never Used   Substance and Sexual Activity    Alcohol use: No    Drug use: No    Sexual activity: Not Currently     Review of Systems   Constitutional: Positive for diaphoresis and malaise/fatigue.   HENT: Negative.     Eyes: Negative.    Cardiovascular:  Positive for chest pain.   Respiratory: Negative.     Endocrine: Negative.    Hematologic/Lymphatic: Negative.    Skin: Negative.    Musculoskeletal: Negative.    Gastrointestinal: Negative.    Genitourinary: Negative.    Neurological: Negative.    Psychiatric/Behavioral: Negative.     Allergic/Immunologic: Negative.    Objective:     Vital Signs (Most Recent):  Temp: 99 °F (37.2 °C) (05/21/22 0700)  Pulse: 64 (05/21/22 1415)  Resp: 20 (05/21/22 1415)  BP: (!) 99/51 (05/21/22 1100)  SpO2: 96 % (05/21/22 1415)   Vital Signs (24h Range):  Temp:  [99 °F (37.2 °C)-100.6 °F (38.1 °C)] 99 °F (37.2 °C)  Pulse:  [55-95] 64  Resp:  [18-54] 20  SpO2:  [86 %-100 %] 96 %  BP: ()/(50-92) 99/51     Weight: 61.2 kg (134 lb 14.7 oz)  Body mass index is 25.49 kg/m².    SpO2: 96 %  O2 Device (Oxygen Therapy): nasal cannula      Intake/Output Summary (Last 24 hours) at 5/21/2022 1437  Last data filed at 5/21/2022 1000  Gross per 24 hour   Intake 561.95 ml   Output 1850 ml   Net -1288.05 ml         Lines/Drains/Airways       Drain  Duration             Female External Urinary Catheter 05/20/22 0130 1 day              Peripheral Intravenous Line  Duration                  Peripheral IV - Single Lumen 05/19/22 2349 22 G Right Forearm 1 day         Peripheral IV - Single Lumen 05/20/22 0700 22 G Anterior;Right Forearm 1 day                    Physical Exam  Vitals and nursing note reviewed.   Constitutional:       General: She is not in acute distress.     Appearance: She is well-developed. She is not diaphoretic.      Comments: On  supplemental O2   HENT:      Head: Normocephalic and atraumatic.   Eyes:      General:         Right eye: No discharge.         Left eye: No discharge.      Pupils: Pupils are equal, round, and reactive to light.   Neck:      Thyroid: No thyromegaly.      Vascular: No JVD.      Trachea: No tracheal deviation.   Cardiovascular:      Rate and Rhythm: Regular rhythm. Bradycardia present.      Heart sounds: Normal heart sounds, S1 normal and S2 normal. No murmur heard.  Pulmonary:      Effort: Pulmonary effort is normal. No respiratory distress.      Breath sounds: Normal breath sounds. No wheezing or rales.   Abdominal:      General: There is no distension.      Palpations: Abdomen is soft.      Tenderness: There is no rebound.   Musculoskeletal:      Cervical back: Neck supple.      Right lower leg: No edema.      Left lower leg: No edema.   Skin:     General: Skin is warm and dry.      Findings: No erythema.   Neurological:      General: No focal deficit present.      Mental Status: She is alert and oriented to person, place, and time.   Psychiatric:         Mood and Affect: Mood normal.         Behavior: Behavior normal.         Thought Content: Thought content normal.       Significant Labs: CMP   Recent Labs   Lab 05/19/22 2318 05/20/22  0510 05/21/22  0741    139 141  141   K 4.3 4.4 4.4  4.4    104 104  104   CO2 21* 23 25  25   * 135* 105  105   BUN 27* 25* 16  16   CREATININE 1.1 1.0 0.8  0.8   CALCIUM 9.5 9.1 8.7  8.7   PROT 6.9 6.1 6.0   ALBUMIN 3.8 3.5 3.4*   BILITOT 0.6 0.5 0.8   ALKPHOS 101 82 69   * 117* 74*   ALT 67* 62* 43   ANIONGAP 13 12 12  12   ESTGFRAFRICA 54* >60 >60  >60   EGFRNONAA 47* 52* >60  >60     , CBC   Recent Labs   Lab 05/19/22 2318 05/20/22  0510 05/21/22  0741   WBC 9.22 9.84 14.09*   HGB 13.8 12.4 12.7   HCT 42.2 39.1 40.4    230 184     , Troponin   Recent Labs   Lab 05/19/22 2318 05/20/22  0510 05/20/22  1250   TROPONINI 2.331* 8.923*  7.262*     , and All pertinent lab results from the last 24 hours have been reviewed.    Significant Imaging: Echocardiogram: Transthoracic echo (TTE) complete (Cupid Only):   Results for orders placed or performed during the hospital encounter of 05/19/22   Echo   Result Value Ref Range    BSA 1.61 m2    TDI SEPTAL 0.03 m/s    LV LATERAL E/E' RATIO 12.50 m/s    LV SEPTAL E/E' RATIO 16.67 m/s    IVC diameter 2.27 cm    Left Ventricular Outflow Tract Mean Velocity 0.17884542207 cm/s    Left Ventricular Outflow Tract Mean Gradient 1.54 mmHg    TDI LATERAL 0.04 m/s    LVIDd 3.61 3.5 - 6.0 cm    IVS 1.30 (A) 0.6 - 1.1 cm    Posterior Wall 1.30 (A) 0.6 - 1.1 cm    Ao root annulus 2.68 cm    LVIDs 2.60 2.1 - 4.0 cm    FS 28 28 - 44 %    Sinus 2.55 cm    STJ 2.63 cm    Ascending aorta 2.39 cm    LV mass 160.71 g    LA size 3.92 cm    TAPSE 1.43 cm    Left Ventricle Relative Wall Thickness 0.72 cm    AV regurgitation pressure 1/2 time 492.647013898063302 ms    AV mean gradient 5 mmHg    AV valve area 1.57 cm2    AV Velocity Ratio 0.62     AV index (prosthetic) 0.64     MV mean gradient 1 mmHg    MV valve area by continuity eq 2.33 cm2    E/A ratio 0.63     Mean e' 0.04 m/s    E wave deceleration time 291.489047635924315 msec    IVRT 125.462069697648385 msec    LVOT diameter 1.77 cm    LVOT area 2.5 cm2    LVOT peak mehrdad 0.93 m/s    LVOT peak VTI 24.40 cm    Ao peak mehrdad 1.50 m/s    Ao VTI 38.3 cm    RVOT peak mehrdad 0.74 m/s    RVOT peak VTI 19.0 cm    Mr max mehrdad 4.26 m/s    LVOT stroke volume 60.01 cm3    AV peak gradient 9 mmHg    MV peak gradient 2 mmHg    PV mean gradient 1.30 mmHg    E/E' ratio 14.29 m/s    MV Peak E Mehrdad 0.50 m/s    AR Max Mehrdad 3.66 m/s    TR Max Mehrdad 3.18 m/s    MV VTI 25.8 cm    MV Peak A Mehrdad 0.79 m/s    LV Systolic Volume 24.52 mL    LV Systolic Volume Index 15.4 mL/m2    LV Diastolic Volume 54.68 mL    LV Diastolic Volume Index 34.39 mL/m2    LV Mass Index 101 g/m2    RA Major Axis 4.03 cm    Left Atrium  Minor Axis 5.00 cm    Left Atrium Major Axis 4.99 cm    Triscuspid Valve Regurgitation Peak Gradient 40 mmHg    RA Width 2.72 cm    Right Atrial Pressure (from IVC) 15 mmHg    EF 30 %    TV rest pulmonary artery pressure 55 mmHg    Narrative    · The left ventricle is normal in size with concentric hypertrophy and   moderately decreased systolic function.  · Grade I left ventricular diastolic dysfunction.  · The estimated PA systolic pressure is 55 mmHg.  · Normal right ventricular size with normal right ventricular systolic   function.  · There is pulmonary hypertension.  · Elevated central venous pressure (15 mmHg).  · The estimated ejection fraction is 30%.  · There are segmental left ventricular wall motion abnormalities.  · Mild aortic regurgitation.  · Mild mitral regurgitation.  · Mild tricuspid regurgitation.      , EKG: Reviewed, and X-Ray: CXR: X-Ray Chest 1 View (CXR): No results found for this visit on 05/19/22. and X-Ray Chest PA and Lateral (CXR): No results found for this visit on 05/19/22.

## 2022-05-21 NOTE — ASSESSMENT & PLAN NOTE
Suspect medication induced; agitation/confusion post procedure + xanax admin  Avoid sedative medications  Family at bedside  Normalize sleep wake cycle as able

## 2022-05-21 NOTE — DISCHARGE SUMMARY
[facility] - Intensive Care ([facility])  [facility] Medicine  Discharge Summary      Patient Name: Leslie Wood  MRN: [id number]  Patient Class: IP- Inpatient  Admission Date: 5/19/2022  Hospital Length of Stay: 1 days  Discharge Date and Time:  05/21/2022 12:53 PM  Attending Physician: Daniel Richey MD   Discharging Provider: Daniel Richey MD  Primary Care Provider: Angeles Carson MD      HPI:   Leslie Wood is an 81 y.o. female with a PMHx of arthritis, chronic back pain, DM II, GERD, HLD, HTN, and h/o CVA who presented to the ED with c/o substernal chest pain that started suddenly approximately 4 hours PTA. Pain is sharp in nature, rated 9/10, radiates into left chest wall, and nonexertional. No aggravating or alleviating factors. Associated SOB, diaphoresis, and nausea. Denies any vomiting, neck or jaw pain, upper extremity pain, numbness/tingling, palpitations, cough, ABD pain, back pain, HA, lightheadedness, dizziness, syncope, weakness, fever or chills. She received 325 mg ASA and nitro spray x 5 per EMS en route to the ED, which improved CP to 4/10. Initial work-up in the ED showed: Troponin 2.331, BNP 59, creatinine 1.1, BUN 27, , ALT 67, unremarkable CXR. EKG showed septal Q waves and nonspecific ST-T abnormalities. She received 2 mg IV Morphine in the ED with temporary improvement in CP. Case was discussed with Cardiology per the ED, and patient started on Tridil and heparin drips per ACS protocol. Plavix load 600 mg and Lipitor 80 mg also given. Hospital Medicine was contacted for admission and patient placed in ICU for unstable angina.   Patient is a Full Code. Her daughter is SDM.       Procedure(s) (LRB):  CATHETERIZATION, HEART, LEFT (Left)  ANGIOGRAM, CORONARY ARTERY (N/A)  Ventriculogram, Left      Hospital Course:   5/20 admitted overnight for chest pain, unstable angina. Cardiology consulted and recommended [facility] today. Nausea overnight. Chest pain improved. Does not wear supplemental oxygen at  baseline. Has nerve stimulator for chronic back pain.  5/21 underwent LHC. 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 Overnight patient received anxiolytic and became confused. No family at bedside to discuss goals    After discussing with cardiology, ok to discharge home with adjustment to medication(s). Discussed with family at bedside plan with no additional concerns.     Patient seen and evaluated by me. Patient was determined to be suitable for d/c. Patient deemed stable for discharge to home with homehealth physical/occupational therapy and nurse practitioner to visit.         Goals of Care Treatment Preferences:  Code Status: Full Code      Consults:   Consults (From admission, onward)        Status Ordering Provider     Inpatient consult to Critical Care Medicine  Once        Provider:  MENG Zapata    Completed ILYA CHAN     IP consult to case management  Once        Provider:  (Not yet assigned)    Completed MYKEL FULLER     Inpatient consult to Cardiology  Once        Provider:  Chilo Carlson MD    Completed MYKEL FULLER          No new Assessment & Plan notes have been filed under this hospital service since the last note was generated.  Service: Hospital Medicine    Final Active Diagnoses:    Diagnosis Date Noted POA    Type 2 diabetes mellitus without complication, without long-term current use of insulin [E11.9] 01/24/2020 Yes     Chronic    Chronic back pain [M54.9, G89.29] 07/22/2019 Yes    Hyperlipidemia LDL goal <100 [E78.5] 07/30/2018 Yes     Chronic    Essential hypertension [I10] 07/30/2015 Yes     Chronic      Problems Resolved During this Admission:    Diagnosis Date Noted Date Resolved POA    PRINCIPAL PROBLEM:  Unstable angina [I20.0] 05/20/2022 05/21/2022 Yes    Altered mental state [R41.82] 05/21/2022 05/21/2022 No    Transaminitis [R74.01] 05/20/2022 05/21/2022 Yes    CINTHIA (acute kidney injury) [N17.9] 05/20/2022 05/21/2022 Yes    NSTEMI (non-ST elevated  myocardial infarction) [I21.4] 05/20/2022 05/21/2022 Yes       Discharged Condition: stable    Disposition: Home or Self Care    Follow Up:   Follow-up Information     Angeles Carson MD. Schedule an appointment as soon as possible for a visit in 3 day(s).    Specialty: Internal Medicine  Why: hospital follow up  Contact information:  [address] [place] [address]  [phone]             Domingo Martins MD. Schedule an appointment as soon as possible for a visit in 1 week(s).    Specialties: Interventional Cardiology, Cardiology  Why: hospital follow up  Contact information:  [address]  [place] [address]  [phone]                       Patient Instructions:      Ambulatory referral/consult to Home Health   Standing Status: Future   Referral Priority: Routine Referral Type: Home Health   Referral Reason: Specialty Services Required   Requested Specialty: Home Health Services   Number of Visits Requested: 1     Ambulatory referral/consult to [facility]   Standing Status: Future   Referral Priority: Routine Referral Type: Consultation   Referral Reason: Specialty Services Required   Number of Visits Requested: 1     Diet Cardiac     Activity as tolerated       Significant Diagnostic Studies: Labs:   CMP   Recent Labs   Lab 05/19/22 2318 05/20/22  0510 05/21/22  0741    139 141  141   K 4.3 4.4 4.4  4.4    104 104  104   CO2 21* 23 25  25   * 135* 105  105   BUN 27* 25* 16  16   CREATININE 1.1 1.0 0.8  0.8   CALCIUM 9.5 9.1 8.7  8.7   PROT 6.9 6.1 6.0   ALBUMIN 3.8 3.5 3.4*   BILITOT 0.6 0.5 0.8   ALKPHOS 101 82 69   * 117* 74*   ALT 67* 62* 43   ANIONGAP 13 12 12  12   ESTGFRAFRICA 54* >60 >60  >60   EGFRNONAA 47* 52* >60  >60   , CBC   Recent Labs   Lab 05/19/22 2318 05/20/22  0510 05/21/22  0741   WBC 9.22 9.84 14.09*   HGB 13.8 12.4 12.7   HCT 42.2 39.1 40.4    230 184   , Lipid Panel   Lab Results    Component Value Date    CHOL 155 05/20/2022    HDL 45 05/20/2022    LDLCALC 98.0 05/20/2022    TRIG 60 05/20/2022    CHOLHDL 29.0 05/20/2022   , Troponin   Recent Labs   Lab 05/19/22  2318 05/20/22  0510 05/20/22  1250   TROPONINI 2.331* 8.923* 7.262*   , A1C:   Recent Labs   Lab 05/20/22  0510   HGBA1C 5.2    and All labs within the past 24 hours have been reviewed  Radiology: X-Ray: CXR: portable  Cardiac Graphics: Echocardiogram:   Transthoracic echo (TTE) complete (Cupid Only):   Results for orders placed or performed during the hospital encounter of 05/19/22   Echo   Result Value Ref Range    BSA 1.61 m2    TDI SEPTAL 0.03 m/s    LV LATERAL E/E' RATIO 12.50 m/s    LV SEPTAL E/E' RATIO 16.67 m/s    IVC diameter 2.27 cm    Left Ventricular Outflow Tract Mean Velocity 0.54191539446 cm/s    Left Ventricular Outflow Tract Mean Gradient 1.54 mmHg    TDI LATERAL 0.04 m/s    LVIDd 3.61 3.5 - 6.0 cm    IVS 1.30 (A) 0.6 - 1.1 cm    Posterior Wall 1.30 (A) 0.6 - 1.1 cm    Ao root annulus 2.68 cm    LVIDs 2.60 2.1 - 4.0 cm    FS 28 28 - 44 %    Sinus 2.55 cm    STJ 2.63 cm    Ascending aorta 2.39 cm    LV mass 160.71 g    LA size 3.92 cm    TAPSE 1.43 cm    Left Ventricle Relative Wall Thickness 0.72 cm    AV regurgitation pressure 1/2 time 492.421799577416885 ms    AV mean gradient 5 mmHg    AV valve area 1.57 cm2    AV Velocity Ratio 0.62     AV index (prosthetic) 0.64     MV mean gradient 1 mmHg    MV valve area by continuity eq 2.33 cm2    E/A ratio 0.63     Mean e' 0.04 m/s    E wave deceleration time 291.809971947180091 msec    IVRT 125.357966178041403 msec    LVOT diameter 1.77 cm    LVOT area 2.5 cm2    LVOT peak nuzhat 0.93 m/s    LVOT peak VTI 24.40 cm    Ao peak nuzhat 1.50 m/s    Ao VTI 38.3 cm    RVOT peak nuzhat 0.74 m/s    RVOT peak VTI 19.0 cm    Mr max nuzhat 4.26 m/s    LVOT stroke volume 60.01 cm3    AV peak gradient 9 mmHg    MV peak gradient 2 mmHg    PV mean gradient 1.30 mmHg    E/E' ratio 14.29 m/s    MV Peak  E Mehrdad 0.50 m/s    AR Max Mehrdad 3.66 m/s    TR Max Mehrdad 3.18 m/s    MV VTI 25.8 cm    MV Peak A Mehrdad 0.79 m/s    LV Systolic Volume 24.52 mL    LV Systolic Volume Index 15.4 mL/m2    LV Diastolic Volume 54.68 mL    LV Diastolic Volume Index 34.39 mL/m2    LV Mass Index 101 g/m2    RA Major Axis 4.03 cm    Left Atrium Minor Axis 5.00 cm    Left Atrium Major Axis 4.99 cm    Triscuspid Valve Regurgitation Peak Gradient 40 mmHg    RA Width 2.72 cm    Right Atrial Pressure (from IVC) 15 mmHg    EF 30 %    TV rest pulmonary artery pressure 55 mmHg    Narrative    · The left ventricle is normal in size with concentric hypertrophy and   moderately decreased systolic function.  · Grade I left ventricular diastolic dysfunction.  · The estimated PA systolic pressure is 55 mmHg.  · Normal right ventricular size with normal right ventricular systolic   function.  · There is pulmonary hypertension.  · Elevated central venous pressure (15 mmHg).  · The estimated ejection fraction is 30%.  · There are segmental left ventricular wall motion abnormalities.  · Mild aortic regurgitation.  · Mild mitral regurgitation.  · Mild tricuspid regurgitation.       and cardiac cath    Pending Diagnostic Studies:     Procedure Component Value Units Date/Time    EKG 12-lead [[id number]] Collected: 05/20/22 0700    Order Status: Sent Lab Status: No result          Medications:  Reconciled Home Medications:      Medication List      START taking these medications    atorvastatin 80 MG tablet  Commonly known as: LIPITOR  Take 1 tablet (80 mg total) by mouth every evening.     clopidogreL 75 mg tablet  Commonly known as: PLAVIX  Take 1 tablet (75 mg total) by mouth once daily.  Start taking on: May 22, 2022     metoprolol tartrate 25 MG tablet  Commonly known as: LOPRESSOR  Take 1 tablet (25 mg total) by mouth 3 (three) times daily.     nitroGLYCERIN 0.4 MG SL tablet  Commonly known as: NITROSTAT  Place 1 tablet (0.4 mg total) under the tongue every 5  (five) minutes as needed for Chest pain.        CONTINUE taking these medications    aspirin 81 MG EC tablet  Commonly known as: ECOTRIN  Take 81 mg by mouth once daily.     diclofenac sodium 1 % Gel  Commonly known as: VOLTAREN  Apply 2 g topically 3 (three) times daily.     traZODone 50 MG tablet  Commonly known as: DESYREL  TAKE 1 TABLET BY MOUTH ONCE AT BEDTIME.        STOP taking these medications    carvediloL 6.25 MG tablet  Commonly known as: COREG     cloNIDine 0.1 MG tablet  Commonly known as: CATAPRES     HYDROcodone-acetaminophen  mg per tablet  Commonly known as: NORCO     isosorbide mononitrate 30 MG 24 hr tablet  Commonly known as: IMDUR     valsartan 160 MG tablet  Commonly known as: DIOVAN            Indwelling Lines/Drains at time of discharge:   Lines/Drains/Airways     Drain  Duration           Female External Urinary Catheter 05/20/22 0130 1 day                Time spent on the discharge of patient: 35 minutes    Critical care time spent on the evaluation and treatment of severe organ dysfunction, review of pertinent labs and imaging studies, discussions with consulting providers and discussions with patient/family: 30 minutes.     Daniel Richey MD  Department of Hospital Medicine  [facility] - Intensive Care ([facility])

## 2022-05-21 NOTE — ASSESSMENT & PLAN NOTE
- Continue statin for now. DC if LFTs trend upward.  - Hold hepatotoxic agents.  - Follow labs.     5/20  Not 10x ULN  Risk v benefit    Improved

## 2022-05-21 NOTE — ASSESSMENT & PLAN NOTE
- Patient with h/o statin intolerance. Starting on 80 mg Lipitor daily per Cardiology. FLP in AM.     5/20  Lipid panel pending  Statin intolerance?  On high dose statin without complication  Liver enzymes mildly elevated, not 10x ULN  Prior liver enzymes within normal limits     Continue statin

## 2022-05-21 NOTE — SUBJECTIVE & OBJECTIVE
Interval History: See hospital course for today      Review of Systems   Unable to perform ROS: Mental status change   Objective:     Vital Signs (Most Recent):  Temp: (!) 100.4 °F (38 °C) (05/21/22 0315)  Pulse: 68 (05/21/22 0700)  Resp: 19 (05/21/22 0700)  BP: (!) 93/50 (05/21/22 0700)  SpO2: 97 % (05/21/22 0842)   Vital Signs (24h Range):  Temp:  [100.2 °F (37.9 °C)-100.6 °F (38.1 °C)] 100.4 °F (38 °C)  Pulse:  [45-88] 68  Resp:  [12-54] 19  SpO2:  [90 %-100 %] 97 %  BP: ()/(50-92) 93/50     Weight: 61.2 kg (134 lb 14.7 oz)  Body mass index is 25.49 kg/m².    Intake/Output Summary (Last 24 hours) at 5/21/2022 0852  Last data filed at 5/21/2022 0000  Gross per 24 hour   Intake 1318.3 ml   Output 1850 ml   Net -531.7 ml      Physical Exam  Vitals and nursing note reviewed. Exam conducted with a chaperone present (nursing).   Constitutional:       General: She is awake. She is not in acute distress.     Appearance: She is ill-appearing. She is not toxic-appearing.      Interventions: Nasal cannula in place.   HENT:      Head: Normocephalic and atraumatic.   Cardiovascular:      Rate and Rhythm: Normal rate.   Pulmonary:      Effort: Tachypnea and respiratory distress present.      Breath sounds: Decreased air movement present.   Abdominal:      Palpations: Abdomen is soft.   Genitourinary:     Comments: whalen  Musculoskeletal:         General: Swelling and tenderness present.   Skin:     General: Skin is warm.      Findings: Bruising present.   Neurological:      Mental Status: She is disoriented and confused.   Psychiatric:         Mood and Affect: Mood is anxious and depressed. Affect is tearful.         Behavior: Behavior is uncooperative and agitated.         Cognition and Memory: She exhibits impaired recent memory.       Significant Labs: All pertinent labs within the past 24 hours have been reviewed.  CBC:   Recent Labs   Lab 05/19/22  2318 05/20/22  0510 05/21/22  0741   WBC 9.22 9.84 14.09*   HGB 13.8  12.4 12.7   HCT 42.2 39.1 40.4    230 184     CMP:   Recent Labs   Lab 05/19/22  2318 05/20/22  0510 05/21/22  0741    139 141  141   K 4.3 4.4 4.4  4.4    104 104  104   CO2 21* 23 25  25   * 135* 105  105   BUN 27* 25* 16  16   CREATININE 1.1 1.0 0.8  0.8   CALCIUM 9.5 9.1 8.7  8.7   PROT 6.9 6.1 6.0   ALBUMIN 3.8 3.5 3.4*   BILITOT 0.6 0.5 0.8   ALKPHOS 101 82 69   * 117* 74*   ALT 67* 62* 43   ANIONGAP 13 12 12  12   EGFRNONAA 47* 52* >60  >60       Significant Imaging: I have reviewed all pertinent imaging results/findings within the past 24 hours.  I have reviewed and interpreted all pertinent imaging results/findings within the past 24 hours.cardiac cath, possible apical form of stress cardiomyopathy

## 2022-05-21 NOTE — ASSESSMENT & PLAN NOTE
WITH ANTERIOR WALL HYPOKINESIS TO AKINESIS   CW TOPROL   NITRO PRN ON DISCHARGE  CW DAPT   GIVEN MILD DELIRIUM ON TOP OF HER DEMENTIA AND GIVEN STABLIZATION AND RESOLUTION OF CARDIAC SYMPTOMS   OK TO DC HOME   BP ON LOW SIDE CANNOT TOLERATE HIGHER DOSES OF MEDS   PO LASIX ALSO ON DISCHARGE

## 2022-05-21 NOTE — ASSESSMENT & PLAN NOTE
Received anxiolytic overnight  Mild leukocytosis and fever  Agitated  Aspiration vs medication induced vs procedure-related

## 2022-05-21 NOTE — ASSESSMENT & PLAN NOTE
- Diet controlled at home.  - Accuchecks with low dose SSI if needed.  - Diabetic diet.  - Check HbA1c.     5/20   Controlled   Repeat hba1c pending  Npo  Cardiac and diabetic diet when able    5/21  Controlled   Cardiac diet resumed

## 2022-05-21 NOTE — PROGRESS NOTES
[facility] ([facility])  Critical Care Medicine  Progress Note    Patient Name: Leslie Wood  MRN: [id number]  Admission Date: 2022  Hospital Length of Stay: 1 days  Code Status: Full Code  Attending Provider: Daniel Richey MD  Primary Care Provider: Angeles Carson MD   Principal Problem: Unstable angina    Subjective:     HPI:  History obtained from patient     81 year old female with PMHx of DM II, GERD, HLD, CVA, and HTN who presented to [facility] ED on the night of  for nonradiating substernal/left sided chest pain which began shortly after attending a  yesterday afternoon along with intermittent SOB, nausea, and three episodes of vomiting PTA. 325 mg ASA and 5 sprays of nitro spray administered en route by EMS which pt states relieved most of her symptoms.   Initial ED workup noted EKG with septal Q waves and nonspecific ST-T abnormalities; trop 2.331, BNP 59, creatinine 1.1, BUN 27. CXR unremarkable. Placed on Heparin and Tridil infusion per cardiology recommendation. Critical care consulted for unstable angina with need for Heparin/Nitro infusions.       Hospital/ICU Course:  - Pt arrived to ICU overnight. Currently on Tridil and Heparin infusions. NAD w/ 10 chest pain at this time. Vital signs stable. Angiogram planned for today  [facility] revealed normal coronary arteries; suspect takotsubo  Returned from [facility] again complaining of chest pain; given xanax after which complained of SOB (oxygenating well with no increased work of breathing) and became agitated with care and confused   - overnight weaned off tridil and no complaint of CP but remained mildly agitated and confused to situation and refused her oral medications. VSS off infusions, 2L NC oxygen sats %      Review of Systems   Reason unable to perform ROS: limited with mild confusion/agitation.   Constitutional:  Negative for chills and fever.   HENT:  Negative for sore throat.    Respiratory:  Negative for  shortness of breath.    Cardiovascular:  Negative for chest pain.   Gastrointestinal:  Negative for nausea and vomiting.   Skin: Negative.    Neurological:  Negative for focal weakness.   Psychiatric/Behavioral:  The patient is nervous/anxious.        Objective:     Vital Signs (Most Recent):  Temp: (!) 100.4 °F (38 °C) (05/21/22 0315)  Pulse: 68 (05/21/22 0700)  Resp: 19 (05/21/22 0700)  BP: (!) 93/50 (05/21/22 0700)  SpO2: 98 % (05/21/22 0700)   Vital Signs (24h Range):  Temp:  [100.2 °F (37.9 °C)-100.6 °F (38.1 °C)] 100.4 °F (38 °C)  Pulse:  [45-88] 68  Resp:  [12-54] 19  SpO2:  [90 %-100 %] 98 %  BP: ()/(50-92) 93/50     Weight: 61.2 kg (134 lb 14.7 oz)  Body mass index is 25.49 kg/m².      Intake/Output Summary (Last 24 hours) at 5/21/2022 0722  Last data filed at 5/21/2022 0000  Gross per 24 hour   Intake 1318.3 ml   Output 2050 ml   Net -731.7 ml      nitroGLYCERIN Stopped (05/20/22 2108)       Physical Exam  Vitals and nursing note reviewed.   Constitutional:       General: She is not in acute distress.     Interventions: Nasal cannula in place.   HENT:      Head: Atraumatic.   Eyes:      Conjunctiva/sclera: Conjunctivae normal.   Cardiovascular:      Rate and Rhythm: Normal rate and regular rhythm.      Pulses:           Radial pulses are 2+ on the right side and 2+ on the left side.        Dorsalis pedis pulses are 2+ on the right side and 2+ on the left side.   Pulmonary:      Effort: Pulmonary effort is normal.      Breath sounds: Normal breath sounds.   Abdominal:      General: Bowel sounds are normal. There is no distension.      Palpations: Abdomen is soft.   Musculoskeletal:      Right lower leg: No edema.      Left lower leg: No edema.   Skin:     General: Skin is warm and dry.      Capillary Refill: Capillary refill takes less than 2 seconds.      Comments: Bruising to left forearm  Mild bruising around bandage to rt groin, no hematoma, drainage or induration   Neurological:      Mental  Status: She is alert.      GCS: GCS eye subscore is 4. GCS verbal subscore is 4. GCS motor subscore is 6.   Psychiatric:         Mood and Affect: Mood is anxious. Affect is angry.         Speech: Speech normal.         Behavior: Behavior is agitated.         Thought Content: Thought content is paranoid.         Cognition and Memory: She exhibits impaired recent memory.         Judgment: Judgment is impulsive.       Vents:  Oxygen Concentration (%): 28 (05/21/22 0705)    Lines/Drains/Airways       Drain  Duration             Female External Urinary Catheter 05/20/22 0130 1 day              Peripheral Intravenous Line  Duration                  Peripheral IV - Single Lumen 05/19/22 2349 22 G Right Forearm 1 day         Peripheral IV - Single Lumen 05/20/22 0700 22 G Anterior;Right Forearm 1 day                    Significant Labs:    CBC/Anemia Profile:  Recent Labs   Lab 05/19/22 2318 05/20/22  0510   WBC 9.22 9.84   HGB 13.8 12.4   HCT 42.2 39.1    230   MCV 89 93   RDW 13.2 13.2        Chemistries:  Recent Labs   Lab 05/19/22 2318 05/20/22  0510    139   K 4.3 4.4    104   CO2 21* 23   BUN 27* 25*   CREATININE 1.1 1.0   CALCIUM 9.5 9.1   ALBUMIN 3.8 3.5   PROT 6.9 6.1   BILITOT 0.6 0.5   ALKPHOS 101 82   ALT 67* 62*   * 117*   MG  --  1.8       All pertinent labs within the past 24 hours have been reviewed.    Significant Imaging:  I have reviewed all pertinent imaging results/findings within the past 24 hours.      ABG  No results for input(s): PH, PO2, PCO2, HCO3, BE in the last 168 hours.  Assessment/Plan:     Neuro  Altered mental state  Suspect medication induced; agitation/confusion post procedure + xanax admin  Avoid sedative medications  Family at bedside  Normalize sleep wake cycle as able    Cardiac/Vascular  * Unstable angina  See NSTEMI  [facility] concerning for takotsubo    NSTEMI (non-ST elevated myocardial infarction)  LHC on 5/20 suspicious for takotsubo; normal  coronaries  Plan continue ASA, plavix, BB as tolerated  Cardiology ok with discharge home per medicine team      Essential hypertension  Borderline low since admit  Continue BB for atypical/takotsubo NSTEMI as tolerated      Endocrine  Type 2 diabetes mellitus without complication, without long-term current use of insulin  Glucose stable 141-157  Accuchecks q6  Low SSI    GI  Transaminitis  Returning to nl range; likely s/t NSTEMI       Critical Care Daily Checklist:    A: Awake: RASS Goal/Actual Goal:    Actual: [place] Agitation Sedation Scale (RASS): Restless   B: Spontaneous Breathing Trial Performed?     C: SAT & SBT Coordinated?  n/a                      D: Delirium: CAM-ICU Overall CAM-ICU: Positive   E: Early Mobility Performed? Yes   F: Feeding Goal:    Status:     Current Diet Order   Procedures    Diet Cardiac      AS: Analgesia/Sedation Avoid sedatives   T: Thromboembolic Prophylaxis lovenox if not discharged today   H: HOB > 300 Yes   U: Stress Ulcer Prophylaxis (if needed) pepcid   G: Glucose Control As above   B: Bowel Function     I: Indwelling Catheter (Lines & Kellogg) Necessity reviewed   D: De-escalation of Antimicrobials/Pharmacotherapies reviewed    Plan for the day/ETD As above; ok from CC standpoint for transfer or discharge    Code Status:  Family/Goals of Care: Full Code  Home with family on d/c   I have discussed case and plan of care in detail with Dr Diaz, Dr Martins, and Dr Richey; Status and plan of care were discussed with team on multidisciplinary rounds.  CC team will sign off if transferred out of ICU.     SYLVIA Zapata-BC  Critical Care Medicine  [facility] ([facility])

## 2022-05-21 NOTE — PROGRESS NOTES
[facility] ([facility])  [facility] Medicine  Progress Note    Patient Name: Leslie Wood  MRN: [id number]  Patient Class: IP- Inpatient   Admission Date: 5/19/2022  Length of Stay: 1 days  Attending Physician: Daniel Richey MD  Primary Care Provider: Angeles Carson MD        Subjective:     Principal Problem:Unstable angina        HPI:  Leslie Wood is an 81 y.o. female with a PMHx of arthritis, chronic back pain, DM II, GERD, HLD, HTN, and h/o CVA who presented to the ED with c/o substernal chest pain that started suddenly approximately 4 hours PTA. Pain is sharp in nature, rated 9/10, radiates into left chest wall, and nonexertional. No aggravating or alleviating factors. Associated SOB, diaphoresis, and nausea. Denies any vomiting, neck or jaw pain, upper extremity pain, numbness/tingling, palpitations, cough, ABD pain, back pain, HA, lightheadedness, dizziness, syncope, weakness, fever or chills. She received 325 mg ASA and nitro spray x 5 per EMS en route to the ED, which improved CP to 4/10. Initial work-up in the ED showed: Troponin 2.331, BNP 59, creatinine 1.1, BUN 27, , ALT 67, unremarkable CXR. EKG showed septal Q waves and nonspecific ST-T abnormalities. She received 2 mg IV Morphine in the ED with temporary improvement in CP. Case was discussed with Cardiology per the ED, and patient started on Tridil and heparin drips per ACS protocol. Plavix load 600 mg and Lipitor 80 mg also given. Hospital Medicine was contacted for admission and patient placed in ICU for unstable angina.   Patient is a Full Code. Her daughter is SDM.       Overview/Hospital Course:  5/20 admitted overnight for chest pain, unstable angina. Cardiology consulted and recommended LHC today. Nausea overnight. Chest pain improved. Does not wear supplemental oxygen at baseline. Has nerve stimulator for chronic back pain.  5/21 underwent LHC. 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 Overnight patient received  anxiolytic and became confused. No family at bedside to discuss goals      Interval History: See hospital course for today      Review of Systems   Unable to perform ROS: Mental status change   Objective:     Vital Signs (Most Recent):  Temp: (!) 100.4 °F (38 °C) (05/21/22 0315)  Pulse: 68 (05/21/22 0700)  Resp: 19 (05/21/22 0700)  BP: (!) 93/50 (05/21/22 0700)  SpO2: 97 % (05/21/22 0842)   Vital Signs (24h Range):  Temp:  [100.2 °F (37.9 °C)-100.6 °F (38.1 °C)] 100.4 °F (38 °C)  Pulse:  [45-88] 68  Resp:  [12-54] 19  SpO2:  [90 %-100 %] 97 %  BP: ()/(50-92) 93/50     Weight: 61.2 kg (134 lb 14.7 oz)  Body mass index is 25.49 kg/m².    Intake/Output Summary (Last 24 hours) at 5/21/2022 0852  Last data filed at 5/21/2022 0000  Gross per 24 hour   Intake 1318.3 ml   Output 1850 ml   Net -531.7 ml      Physical Exam  Vitals and nursing note reviewed. Exam conducted with a chaperone present (nursing).   Constitutional:       General: She is awake. She is not in acute distress.     Appearance: She is ill-appearing. She is not toxic-appearing.      Interventions: Nasal cannula in place.   HENT:      Head: Normocephalic and atraumatic.   Cardiovascular:      Rate and Rhythm: Normal rate.   Pulmonary:      Effort: Tachypnea and respiratory distress present.      Breath sounds: Decreased air movement present.   Abdominal:      Palpations: Abdomen is soft.   Genitourinary:     Comments: whalen  Musculoskeletal:         General: Swelling and tenderness present.   Skin:     General: Skin is warm.      Findings: Bruising present.   Neurological:      Mental Status: She is disoriented and confused.   Psychiatric:         Mood and Affect: Mood is anxious and depressed. Affect is tearful.         Behavior: Behavior is uncooperative and agitated.         Cognition and Memory: She exhibits impaired recent memory.       Significant Labs: All pertinent labs within the past 24 hours have been reviewed.  CBC:   Recent Labs   Lab  05/19/22  2318 05/20/22  0510 05/21/22  0741   WBC 9.22 9.84 14.09*   HGB 13.8 12.4 12.7   HCT 42.2 39.1 40.4    230 184     CMP:   Recent Labs   Lab 05/19/22  2318 05/20/22  0510 05/21/22  0741    139 141  141   K 4.3 4.4 4.4  4.4    104 104  104   CO2 21* 23 25  25   * 135* 105  105   BUN 27* 25* 16  16   CREATININE 1.1 1.0 0.8  0.8   CALCIUM 9.5 9.1 8.7  8.7   PROT 6.9 6.1 6.0   ALBUMIN 3.8 3.5 3.4*   BILITOT 0.6 0.5 0.8   ALKPHOS 101 82 69   * 117* 74*   ALT 67* 62* 43   ANIONGAP 13 12 12  12   EGFRNONAA 47* 52* >60  >60       Significant Imaging: I have reviewed all pertinent imaging results/findings within the past 24 hours.  I have reviewed and interpreted all pertinent imaging results/findings within the past 24 hours.cardiac cath, possible apical form of stress cardiomyopathy      Assessment/Plan:      * Unstable angina  - Troponin 2.331. EKG showed septal Q waves and nonspecific ST-T abnormalities. CP free on admission.    - On Tridil drip.  - Continue heparin drip per nomogram.  - Plavix load given in the ED. Continue 75 mg Plavix daily. Continue daily ASA. Start metoprolol tartrate 25 mg TID and Lipitor 80 mg daily.     - Trend serial cardiac enzymes and EKG.  - Check FLP.  - Will obtain 2D echo.  - Cardiology consult. Will keep NPO after MN for [facility] tomorrow.     See NSTEMI    5/21  Concerns for stress cardiomyopathy   Troponin(s) remain elevated    Altered mental state  Received anxiolytic overnight  Mild leukocytosis and fever  Agitated  Aspiration vs medication induced vs procedure-related      NSTEMI (non-ST elevated myocardial infarction)  Cardiology consulted  On heparin and tridil gtt  Awaiting [facility]  Continue aspirin, beta blocker, statin, plavix  Echocardiogram pending    Troponin(s) remain elevated  Cardiology following  Awaiting further recommendations     Transaminitis  - Continue statin for now. DC if LFTs trend upward.  - Hold hepatotoxic agents.  -  Follow labs.     5/20  Not 10x ULN  Risk v benefit    Improved     Type 2 diabetes mellitus without complication, without long-term current use of insulin  - Diet controlled at home.  - Accuchecks with low dose SSI if needed.  - Diabetic diet.  - Check HbA1c.     5/20   Controlled   Repeat hba1c pending  Npo  Cardiac and diabetic diet when able    5/21  Controlled   Cardiac diet resumed      Hyperlipidemia LDL goal <100  - Patient with h/o statin intolerance. Starting on 80 mg Lipitor daily per Cardiology. FLP in AM.     5/20  Lipid panel pending  Statin intolerance?  On high dose statin without complication  Liver enzymes mildly elevated, not 10x ULN  Prior liver enzymes within normal limits     Continue statin    Chronic back pain  Nerve stimulator       Essential hypertension  - On Tridil drip. Hold home Imdur for now.   - Switching Coreg to PO Lopressor as above per Cardiology.   - Continue home clonidine.  - Hold valsartan due to CINTHIA.     5/20  On tridil gtt  Cardiology following    Now hypotensive   Cardiology following for further recommendations       VTE Risk Mitigation (From admission, onward)         Ordered     IP VTE HIGH RISK PATIENT  Once         05/20/22 0034     Place sequential compression device  Until discontinued         05/20/22 0034                Discharge Planning   JOSE:      Code Status: Full Code   Is the patient medically ready for discharge?:     Reason for patient still in hospital (select all that apply): Patient new problem, Patient trending condition, Laboratory test, Treatment, Consult recommendations and Pending disposition  Discharge Plan A: Home Health   Discharge Delays: None known at this time        Critical care time spent on the evaluation and treatment of severe organ dysfunction, review of pertinent labs and imaging studies, discussions with consulting providers and discussions with patient/family: 33 minutes.      Daniel Richey MD  Department of Hospital Medicine   [facility]  - Intensive Care ([facility])

## 2022-05-21 NOTE — ASSESSMENT & PLAN NOTE
LHC on 5/20 suspicious for takotsubo; normal coronaries  Plan continue ASA, plavix, BB as tolerated  Cardiology ok with discharge home per medicine team

## 2022-05-21 NOTE — DISCHARGE INSTRUCTIONS
New medication(s) have been prescribed as a result of this hospitalization. Please take as directed and schedule a hospital follow up appointment with your primary care provider and primary cardiologist to monitor your symptoms.     Homehealth with physical/occupational therapy has been ordered.     A nurse practitioner may be contacting you to assess your status post-hospitalization.

## 2022-05-21 NOTE — ASSESSMENT & PLAN NOTE
- On Tridil drip. Hold home Imdur for now.   - Switching Coreg to PO Lopressor as above per Cardiology.   - Continue home clonidine.  - Hold valsartan due to CINTHIA.     5/20  On tridil gtt  Cardiology following    Now hypotensive   Cardiology following for further recommendations

## 2022-05-24 ENCOUNTER — HOSPITAL ENCOUNTER (INPATIENT)
Facility: HOSPITAL | Age: 82
LOS: 1 days | Discharge: HOME | DRG: 281 | End: 2022-05-25
Attending: EMERGENCY MEDICINE | Admitting: INTERNAL MEDICINE
Payer: MEDICARE

## 2022-05-24 DIAGNOSIS — M81.8 OTHER OSTEOPOROSIS WITHOUT CURRENT PATHOLOGICAL FRACTURE: ICD-10-CM

## 2022-05-24 DIAGNOSIS — Q24.5 CORONARY-MYOCARDIAL BRIDGE: Primary | ICD-10-CM

## 2022-05-24 DIAGNOSIS — I69.354 HEMIPLEGIA AND HEMIPARESIS FOLLOWING CEREBRAL INFARCTION AFFECTING LEFT NON-DOMINANT SIDE: ICD-10-CM

## 2022-05-24 DIAGNOSIS — I21.3 ST ELEVATION MYOCARDIAL INFARCTION (STEMI), UNSPECIFIED ARTERY: ICD-10-CM

## 2022-05-24 DIAGNOSIS — I21.3 ACUTE ST ELEVATION MYOCARDIAL INFARCTION (STEMI), UNSPECIFIED ARTERY: ICD-10-CM

## 2022-05-24 DIAGNOSIS — R07.9 CHEST PAIN: ICD-10-CM

## 2022-05-24 LAB
ALBUMIN SERPL BCP-MCNC: 3.6 G/DL (ref 3.5–5.2)
ALP SERPL-CCNC: 73 U/L (ref 55–135)
ALT SERPL W/O P-5'-P-CCNC: 47 U/L (ref 10–44)
ANION GAP SERPL CALC-SCNC: 17 MMOL/L (ref 8–16)
APTT BLDCRRT: 107.6 SEC (ref 21–32)
APTT BLDCRRT: 23.6 SEC (ref 21–32)
AST SERPL-CCNC: 49 U/L (ref 10–40)
BASOPHILS # BLD AUTO: 0.03 K/UL (ref 0–0.2)
BASOPHILS # BLD AUTO: 0.04 K/UL (ref 0–0.2)
BASOPHILS NFR BLD: 0.3 % (ref 0–1.9)
BASOPHILS NFR BLD: 0.4 % (ref 0–1.9)
BILIRUB SERPL-MCNC: 0.9 MG/DL (ref 0.1–1)
BUN SERPL-MCNC: 19 MG/DL (ref 8–23)
CALCIUM SERPL-MCNC: 9.4 MG/DL (ref 8.7–10.5)
CHLORIDE SERPL-SCNC: 108 MMOL/L (ref 95–110)
CO2 SERPL-SCNC: 23 MMOL/L (ref 23–29)
CREAT SERPL-MCNC: 1 MG/DL (ref 0.5–1.4)
DIFFERENTIAL METHOD: ABNORMAL
DIFFERENTIAL METHOD: ABNORMAL
EOSINOPHIL # BLD AUTO: 0.1 K/UL (ref 0–0.5)
EOSINOPHIL # BLD AUTO: 0.2 K/UL (ref 0–0.5)
EOSINOPHIL NFR BLD: 1.5 % (ref 0–8)
EOSINOPHIL NFR BLD: 1.9 % (ref 0–8)
ERYTHROCYTE [DISTWIDTH] IN BLOOD BY AUTOMATED COUNT: 13.5 % (ref 11.5–14.5)
ERYTHROCYTE [DISTWIDTH] IN BLOOD BY AUTOMATED COUNT: 13.6 % (ref 11.5–14.5)
EST. GFR  (AFRICAN AMERICAN): >60 ML/MIN/1.73 M^2
EST. GFR  (NON AFRICAN AMERICAN): 52 ML/MIN/1.73 M^2
GLUCOSE SERPL-MCNC: 157 MG/DL (ref 70–110)
HCT VFR BLD AUTO: 37.7 % (ref 37–48.5)
HCT VFR BLD AUTO: 40.1 % (ref 37–48.5)
HGB BLD-MCNC: 12 G/DL (ref 12–16)
HGB BLD-MCNC: 13.1 G/DL (ref 12–16)
IMM GRANULOCYTES # BLD AUTO: 0.03 K/UL (ref 0–0.04)
IMM GRANULOCYTES # BLD AUTO: 0.04 K/UL (ref 0–0.04)
IMM GRANULOCYTES NFR BLD AUTO: 0.3 % (ref 0–0.5)
IMM GRANULOCYTES NFR BLD AUTO: 0.4 % (ref 0–0.5)
INR PPP: 1 (ref 0.8–1.2)
INR PPP: 1.1 (ref 0.8–1.2)
LYMPHOCYTES # BLD AUTO: 2.1 K/UL (ref 1–4.8)
LYMPHOCYTES # BLD AUTO: 2.3 K/UL (ref 1–4.8)
LYMPHOCYTES NFR BLD: 22.7 % (ref 18–48)
LYMPHOCYTES NFR BLD: 23 % (ref 18–48)
MCH RBC QN AUTO: 29.2 PG (ref 27–31)
MCH RBC QN AUTO: 29.7 PG (ref 27–31)
MCHC RBC AUTO-ENTMCNC: 31.8 G/DL (ref 32–36)
MCHC RBC AUTO-ENTMCNC: 32.7 G/DL (ref 32–36)
MCV RBC AUTO: 90 FL (ref 82–98)
MCV RBC AUTO: 93 FL (ref 82–98)
MONOCYTES # BLD AUTO: 1.1 K/UL (ref 0.3–1)
MONOCYTES # BLD AUTO: 1.1 K/UL (ref 0.3–1)
MONOCYTES NFR BLD: 11 % (ref 4–15)
MONOCYTES NFR BLD: 12.5 % (ref 4–15)
NEUTROPHILS # BLD AUTO: 5.7 K/UL (ref 1.8–7.7)
NEUTROPHILS # BLD AUTO: 6.3 K/UL (ref 1.8–7.7)
NEUTROPHILS NFR BLD: 62.7 % (ref 38–73)
NEUTROPHILS NFR BLD: 63.3 % (ref 38–73)
NRBC BLD-RTO: 0 /100 WBC
NRBC BLD-RTO: 0 /100 WBC
PLATELET # BLD AUTO: 258 K/UL (ref 150–450)
PLATELET # BLD AUTO: 307 K/UL (ref 150–450)
PLATELET BLD QL SMEAR: ABNORMAL
PMV BLD AUTO: 10 FL (ref 9.2–12.9)
PMV BLD AUTO: 9.7 FL (ref 9.2–12.9)
POC ACTIVATED CLOTTING TIME K: 142 SEC (ref 74–137)
POC ACTIVATED CLOTTING TIME K: 160 SEC (ref 74–137)
POCT GLUCOSE: 130 MG/DL (ref 70–110)
POCT GLUCOSE: 136 MG/DL (ref 70–110)
POTASSIUM SERPL-SCNC: 3.4 MMOL/L (ref 3.5–5.1)
PROT SERPL-MCNC: 7.2 G/DL (ref 6–8.4)
PROTHROMBIN TIME: 10.6 SEC (ref 9–12.5)
PROTHROMBIN TIME: 11.4 SEC (ref 9–12.5)
RBC # BLD AUTO: 4.04 M/UL (ref 4–5.4)
RBC # BLD AUTO: 4.48 M/UL (ref 4–5.4)
SAMPLE: ABNORMAL
SAMPLE: ABNORMAL
SODIUM SERPL-SCNC: 148 MMOL/L (ref 136–145)
TROPONIN I SERPL DL<=0.01 NG/ML-MCNC: 1.87 NG/ML (ref 0–0.03)
TROPONIN I SERPL DL<=0.01 NG/ML-MCNC: 1.91 NG/ML (ref 0–0.03)
WBC # BLD AUTO: 9.06 K/UL (ref 3.9–12.7)
WBC # BLD AUTO: 9.92 K/UL (ref 3.9–12.7)

## 2022-05-24 PROCEDURE — 99291 PR CRITICAL CARE, E/M 30-74 MINUTES: ICD-10-PCS | Mod: ,,, | Performed by: NURSE PRACTITIONER

## 2022-05-24 PROCEDURE — 85730 THROMBOPLASTIN TIME PARTIAL: CPT | Performed by: INTERNAL MEDICINE

## 2022-05-24 PROCEDURE — 96374 THER/PROPH/DIAG INJ IV PUSH: CPT

## 2022-05-24 PROCEDURE — 99152 PR MOD CONSCIOUS SEDATION, SAME PHYS, 5+ YRS, FIRST 15 MIN: ICD-10-PCS | Mod: ,,, | Performed by: INTERNAL MEDICINE

## 2022-05-24 PROCEDURE — 84484 ASSAY OF TROPONIN QUANT: CPT | Performed by: EMERGENCY MEDICINE

## 2022-05-24 PROCEDURE — 99900035 HC TECH TIME PER 15 MIN (STAT)

## 2022-05-24 PROCEDURE — 93454 PR CATH PLACE/CORONARY ANGIO, IMG SUPER/INTERP: ICD-10-PCS | Mod: 26,,, | Performed by: INTERNAL MEDICINE

## 2022-05-24 PROCEDURE — 63600175 PHARM REV CODE 636 W HCPCS: Performed by: INTERNAL MEDICINE

## 2022-05-24 PROCEDURE — 27000221 HC OXYGEN, UP TO 24 HOURS

## 2022-05-24 PROCEDURE — 25000003 PHARM REV CODE 250: Performed by: INTERNAL MEDICINE

## 2022-05-24 PROCEDURE — 99291 CRITICAL CARE FIRST HOUR: CPT | Mod: ,,, | Performed by: NURSE PRACTITIONER

## 2022-05-24 PROCEDURE — 85025 COMPLETE CBC W/AUTO DIFF WBC: CPT | Performed by: INTERNAL MEDICINE

## 2022-05-24 PROCEDURE — 25000003 PHARM REV CODE 250: Performed by: NURSE PRACTITIONER

## 2022-05-24 PROCEDURE — C1769 GUIDE WIRE: HCPCS | Performed by: INTERNAL MEDICINE

## 2022-05-24 PROCEDURE — 85610 PROTHROMBIN TIME: CPT | Performed by: INTERNAL MEDICINE

## 2022-05-24 PROCEDURE — 25500020 PHARM REV CODE 255: Performed by: INTERNAL MEDICINE

## 2022-05-24 PROCEDURE — 27201423 OPTIME MED/SURG SUP & DEVICES STERILE SUPPLY: Performed by: INTERNAL MEDICINE

## 2022-05-24 PROCEDURE — 93005 ELECTROCARDIOGRAM TRACING: CPT

## 2022-05-24 PROCEDURE — 99152 MOD SED SAME PHYS/QHP 5/>YRS: CPT | Mod: ,,, | Performed by: INTERNAL MEDICINE

## 2022-05-24 PROCEDURE — 85730 THROMBOPLASTIN TIME PARTIAL: CPT | Mod: 91 | Performed by: EMERGENCY MEDICINE

## 2022-05-24 PROCEDURE — 99222 PR INITIAL HOSPITAL CARE,LEVL II: ICD-10-PCS | Mod: ,,, | Performed by: INTERNAL MEDICINE

## 2022-05-24 PROCEDURE — 99291 CRITICAL CARE FIRST HOUR: CPT | Mod: 25

## 2022-05-24 PROCEDURE — 99152 MOD SED SAME PHYS/QHP 5/>YRS: CPT | Performed by: INTERNAL MEDICINE

## 2022-05-24 PROCEDURE — 85610 PROTHROMBIN TIME: CPT | Mod: 91 | Performed by: EMERGENCY MEDICINE

## 2022-05-24 PROCEDURE — 93454 CORONARY ARTERY ANGIO S&I: CPT | Performed by: INTERNAL MEDICINE

## 2022-05-24 PROCEDURE — 20000000 HC ICU ROOM

## 2022-05-24 PROCEDURE — C1894 INTRO/SHEATH, NON-LASER: HCPCS | Performed by: INTERNAL MEDICINE

## 2022-05-24 PROCEDURE — 99222 1ST HOSP IP/OBS MODERATE 55: CPT | Mod: ,,, | Performed by: INTERNAL MEDICINE

## 2022-05-24 PROCEDURE — 25000242 PHARM REV CODE 250 ALT 637 W/ HCPCS: Performed by: EMERGENCY MEDICINE

## 2022-05-24 PROCEDURE — 93454 CORONARY ARTERY ANGIO S&I: CPT | Mod: 26,,, | Performed by: INTERNAL MEDICINE

## 2022-05-24 PROCEDURE — 80053 COMPREHEN METABOLIC PANEL: CPT | Performed by: EMERGENCY MEDICINE

## 2022-05-24 PROCEDURE — 25000003 PHARM REV CODE 250: Performed by: EMERGENCY MEDICINE

## 2022-05-24 PROCEDURE — 36415 COLL VENOUS BLD VENIPUNCTURE: CPT | Performed by: INTERNAL MEDICINE

## 2022-05-24 PROCEDURE — 63600175 PHARM REV CODE 636 W HCPCS: Performed by: EMERGENCY MEDICINE

## 2022-05-24 PROCEDURE — 93010 ELECTROCARDIOGRAM REPORT: CPT | Mod: ,,, | Performed by: INTERNAL MEDICINE

## 2022-05-24 PROCEDURE — 93010 EKG 12-LEAD: ICD-10-PCS | Mod: ,,, | Performed by: INTERNAL MEDICINE

## 2022-05-24 PROCEDURE — 85025 COMPLETE CBC W/AUTO DIFF WBC: CPT | Mod: 91 | Performed by: EMERGENCY MEDICINE

## 2022-05-24 RX ORDER — SODIUM CHLORIDE 9 MG/ML
INJECTION, SOLUTION INTRAVENOUS CONTINUOUS
Status: ACTIVE | OUTPATIENT
Start: 2022-05-24 | End: 2022-05-24

## 2022-05-24 RX ORDER — MUPIROCIN 20 MG/G
OINTMENT TOPICAL 2 TIMES DAILY
Status: DISCONTINUED | OUTPATIENT
Start: 2022-05-24 | End: 2022-05-25 | Stop reason: HOSPADM

## 2022-05-24 RX ORDER — IODIXANOL 320 MG/ML
INJECTION, SOLUTION INTRAVASCULAR
Status: DISCONTINUED | OUTPATIENT
Start: 2022-05-24 | End: 2022-05-24 | Stop reason: HOSPADM

## 2022-05-24 RX ORDER — METOPROLOL TARTRATE 50 MG/1
50 TABLET ORAL 3 TIMES DAILY
Status: CANCELLED | OUTPATIENT
Start: 2022-05-24

## 2022-05-24 RX ORDER — NITROGLYCERIN 0.4 MG/1
0.4 TABLET SUBLINGUAL
Status: COMPLETED | OUTPATIENT
Start: 2022-05-24 | End: 2022-05-24

## 2022-05-24 RX ORDER — MIDAZOLAM HYDROCHLORIDE 1 MG/ML
INJECTION, SOLUTION INTRAMUSCULAR; INTRAVENOUS
Status: DISCONTINUED | OUTPATIENT
Start: 2022-05-24 | End: 2022-05-24 | Stop reason: HOSPADM

## 2022-05-24 RX ORDER — TRAZODONE HYDROCHLORIDE 50 MG/1
50 TABLET ORAL NIGHTLY
Status: DISCONTINUED | OUTPATIENT
Start: 2022-05-24 | End: 2022-05-25 | Stop reason: HOSPADM

## 2022-05-24 RX ORDER — IBUPROFEN 200 MG
16 TABLET ORAL
Status: DISCONTINUED | OUTPATIENT
Start: 2022-05-24 | End: 2022-05-25 | Stop reason: HOSPADM

## 2022-05-24 RX ORDER — FENTANYL CITRATE 50 UG/ML
INJECTION, SOLUTION INTRAMUSCULAR; INTRAVENOUS
Status: DISCONTINUED | OUTPATIENT
Start: 2022-05-24 | End: 2022-05-24 | Stop reason: HOSPADM

## 2022-05-24 RX ORDER — METOPROLOL SUCCINATE 25 MG/1
25 TABLET, EXTENDED RELEASE ORAL DAILY
Status: DISCONTINUED | OUTPATIENT
Start: 2022-05-24 | End: 2022-05-24

## 2022-05-24 RX ORDER — FAMOTIDINE 20 MG/1
20 TABLET, FILM COATED ORAL DAILY
Status: DISCONTINUED | OUTPATIENT
Start: 2022-05-24 | End: 2022-05-25 | Stop reason: HOSPADM

## 2022-05-24 RX ORDER — HEPARIN SODIUM 5000 [USP'U]/ML
4000 INJECTION, SOLUTION INTRAVENOUS; SUBCUTANEOUS
Status: COMPLETED | OUTPATIENT
Start: 2022-05-24 | End: 2022-05-24

## 2022-05-24 RX ORDER — DILTIAZEM HYDROCHLORIDE 180 MG/1
180 CAPSULE, COATED, EXTENDED RELEASE ORAL DAILY
Status: DISCONTINUED | OUTPATIENT
Start: 2022-05-24 | End: 2022-05-24

## 2022-05-24 RX ORDER — INSULIN ASPART 100 [IU]/ML
0-5 INJECTION, SOLUTION INTRAVENOUS; SUBCUTANEOUS
Status: DISCONTINUED | OUTPATIENT
Start: 2022-05-24 | End: 2022-05-25 | Stop reason: HOSPADM

## 2022-05-24 RX ORDER — ATORVASTATIN CALCIUM 40 MG/1
80 TABLET, FILM COATED ORAL NIGHTLY
Status: DISCONTINUED | OUTPATIENT
Start: 2022-05-24 | End: 2022-05-25 | Stop reason: HOSPADM

## 2022-05-24 RX ORDER — GLUCAGON 1 MG
1 KIT INJECTION
Status: DISCONTINUED | OUTPATIENT
Start: 2022-05-24 | End: 2022-05-25 | Stop reason: HOSPADM

## 2022-05-24 RX ORDER — ASPIRIN 81 MG/1
81 TABLET ORAL DAILY
Status: DISCONTINUED | OUTPATIENT
Start: 2022-05-25 | End: 2022-05-25 | Stop reason: HOSPADM

## 2022-05-24 RX ORDER — LIDOCAINE HYDROCHLORIDE 20 MG/ML
INJECTION, SOLUTION INFILTRATION; PERINEURAL
Status: DISCONTINUED | OUTPATIENT
Start: 2022-05-24 | End: 2022-05-24 | Stop reason: HOSPADM

## 2022-05-24 RX ORDER — METOPROLOL TARTRATE 1 MG/ML
INJECTION, SOLUTION INTRAVENOUS
Status: DISCONTINUED | OUTPATIENT
Start: 2022-05-24 | End: 2022-05-24 | Stop reason: HOSPADM

## 2022-05-24 RX ORDER — HEPARIN SODIUM,PORCINE/D5W 25000/250
0-40 INTRAVENOUS SOLUTION INTRAVENOUS CONTINUOUS
Status: DISCONTINUED | OUTPATIENT
Start: 2022-05-24 | End: 2022-05-25

## 2022-05-24 RX ORDER — ONDANSETRON 8 MG/1
8 TABLET, ORALLY DISINTEGRATING ORAL EVERY 8 HOURS PRN
Status: DISCONTINUED | OUTPATIENT
Start: 2022-05-24 | End: 2022-05-25 | Stop reason: HOSPADM

## 2022-05-24 RX ORDER — IBUPROFEN 200 MG
24 TABLET ORAL
Status: DISCONTINUED | OUTPATIENT
Start: 2022-05-24 | End: 2022-05-25 | Stop reason: HOSPADM

## 2022-05-24 RX ORDER — CLOPIDOGREL BISULFATE 75 MG/1
75 TABLET ORAL DAILY
Status: DISCONTINUED | OUTPATIENT
Start: 2022-05-24 | End: 2022-05-25 | Stop reason: HOSPADM

## 2022-05-24 RX ORDER — PHENYLEPHRINE HYDROCHLORIDE 10 MG/ML
INJECTION INTRAVENOUS
Status: DISCONTINUED | OUTPATIENT
Start: 2022-05-24 | End: 2022-05-24 | Stop reason: HOSPADM

## 2022-05-24 RX ORDER — METOPROLOL SUCCINATE 50 MG/1
50 TABLET, EXTENDED RELEASE ORAL DAILY
Status: DISCONTINUED | OUTPATIENT
Start: 2022-05-24 | End: 2022-05-25 | Stop reason: HOSPADM

## 2022-05-24 RX ORDER — ASPIRIN 325 MG
325 TABLET ORAL
Status: COMPLETED | OUTPATIENT
Start: 2022-05-24 | End: 2022-05-24

## 2022-05-24 RX ADMIN — HEPARIN SODIUM 12 UNITS/KG/HR: 1000 INJECTION, SOLUTION INTRAVENOUS; SUBCUTANEOUS at 05:05

## 2022-05-24 RX ADMIN — SODIUM CHLORIDE: 0.9 INJECTION, SOLUTION INTRAVENOUS at 10:05

## 2022-05-24 RX ADMIN — CLOPIDOGREL 75 MG: 75 TABLET, FILM COATED ORAL at 10:05

## 2022-05-24 RX ADMIN — ASPIRIN 325 MG ORAL TABLET 325 MG: 325 PILL ORAL at 09:05

## 2022-05-24 RX ADMIN — NITROGLYCERIN 0.4 MG: 0.4 TABLET SUBLINGUAL at 09:05

## 2022-05-24 RX ADMIN — METOPROLOL SUCCINATE 50 MG: 50 TABLET, EXTENDED RELEASE ORAL at 10:05

## 2022-05-24 RX ADMIN — TRAZODONE HYDROCHLORIDE 50 MG: 50 TABLET ORAL at 08:05

## 2022-05-24 RX ADMIN — HEPARIN SODIUM 4000 UNITS: 5000 INJECTION, SOLUTION INTRAVENOUS; SUBCUTANEOUS at 09:05

## 2022-05-24 RX ADMIN — MUPIROCIN: 20 OINTMENT TOPICAL at 09:05

## 2022-05-24 RX ADMIN — ATORVASTATIN CALCIUM 80 MG: 40 TABLET, FILM COATED ORAL at 08:05

## 2022-05-24 RX ADMIN — FAMOTIDINE 20 MG: 20 TABLET ORAL at 03:05

## 2022-05-24 NOTE — HPI
"81 year old female with PMHx of DM II, GERD, HLD, CVA, and HTN who of note was admitted here - due to chest pain/NSTEMI following  for her brother. She underwent LHC on  revealing normal coronary arteries and EF 40% concerning for takotsubo cardiomyopathy    She returned to [facility] ED this am. She was preparing for follow up cardiology clinic appointment when she told family she didn't "feel well" and noted that she had not slept due to "feeling hot" overnight  ED EKG showed acute MI and she was taken directly to [facility]  LHC revealed Severe mid LAD bridging; chest pain and EKG improved with BB therapy  "

## 2022-05-24 NOTE — H&P
[facility] - [facility] ([facility])  Cardiology  History and Physical     Patient Name: Leslie Wood  MRN: [id number]  Admission Date: 5/24/2022  Code Status: Prior   Attending Provider: Domingo Martins MD   Primary Care Physician: Angeles Carson MD  Principal Problem:<principal problem not specified>    Patient information was obtained from patient, relative(s) and ER records.     Subjective:     Chief Complaint:  Chest pain     HPI:  Ms. Wood is an 81 year old female patient whose current medical conditions include chronic back pain, DM type II, GERD, hyperlipidemia, HTN, history of prior CVA, and recent NSTEMI (s/p LHC on 5/20/22 which showed normal coronaries, EF 40%, thought to be possibly stress-induced cardiomyopathy) who presented to [facility] ED this AM with a chief complaint of substernal heavy, tight chest pain that onset overnight. Associated symptoms included diaphoresis and SOB. Patient denied any associated fever, chills, palpitations, near syncope, or syncope. EKG performed in ED consistent with STEMI and patient taken urgently to cath lab for primary PCI.    She received sublingual nitro and 4,000 units of heparin in ED. Consent obtained from POA as patient was distressed/unable to sign.           Past Medical History:   Diagnosis Date    Arthritis     Cataract     GERD (gastroesophageal reflux disease)     Hyperlipidemia     Hypertension     Stroke        Past Surgical History:   Procedure Laterality Date    ADENOIDECTOMY      ADRENAL GLAND SURGERY      APPENDECTOMY      BACK SURGERY      fusion l 4-5 s 1,2,3  fusion l 2-3    CORONARY ANGIOGRAPHY N/A 5/20/2022    Procedure: ANGIOGRAM, CORONARY ARTERY;  Surgeon: Domingo Martins MD;  Location: [facility] CATH LAB;  Service: Cardiology;  Laterality: N/A;    EYE SURGERY      HEMORRHOID SURGERY      HERNIA REPAIR      HYSTERECTOMY      indirect lumbar decompression      percutaneous placement of interspinous extension blocker    LEFT HEART  CATHETERIZATION Left 5/20/2022    Procedure: CATHETERIZATION, HEART, LEFT;  Surgeon: Domingo Martins MD;  Location: [facility] CATH LAB;  Service: Cardiology;  Laterality: Left;    TONSILLECTOMY         Review of patient's allergies indicates:   Allergen Reactions    Prazosin Other (See Comments)     dizziness    Amitriptyline     Hydrochlorothiazide      Causes muscle cramping    Lisinopril      hyperkalemia    Percocet [oxycodone-acetaminophen] Other (See Comments)     Seizures    Codeine Nausea Only and Rash       No current facility-administered medications on file prior to encounter.     Current Outpatient Medications on File Prior to Encounter   Medication Sig    aspirin (ECOTRIN) 81 MG EC tablet Take 81 mg by mouth once daily.    atorvastatin (LIPITOR) 80 MG tablet Take 1 tablet (80 mg total) by mouth every evening.    clopidogreL (PLAVIX) 75 mg tablet Take 1 tablet (75 mg total) by mouth once daily.    diclofenac sodium (VOLTAREN) 1 % Gel Apply 2 g topically 3 (three) times daily.    furosemide (LASIX) 20 MG tablet Take 1 tablet (20 mg total) by mouth once daily.    metoprolol tartrate (LOPRESSOR) 25 MG tablet Take 1 tablet (25 mg total) by mouth 3 (three) times daily.    nitroGLYCERIN (NITROSTAT) 0.4 MG SL tablet Place 1 tablet (0.4 mg total) under the tongue every 5 (five) minutes as needed for Chest pain.    traZODone (DESYREL) 50 MG tablet TAKE 1 TABLET BY MOUTH ONCE AT BEDTIME.    [DISCONTINUED] carvediloL (COREG) 6.25 MG tablet Take 1 tablet (6.25 mg total) by mouth 2 (two) times daily. TAKE (1) TABLET BY MOUTH 2 TIMES A DAY WITH MEALS    [DISCONTINUED] cloNIDine (CATAPRES) 0.1 MG tablet Take 1 tablet (0.1 mg total) by mouth 2 (two) times daily. To take an extra dose if BP >160/90    [DISCONTINUED] isosorbide mononitrate (IMDUR) 30 MG 24 hr tablet TAKE 1 TABLET BY MOUTH TWICE A DAY    [DISCONTINUED] valsartan (DIOVAN) 160 MG tablet TAKE 1 TABLET BY MOUTH TWICE A DAY     Family History        Problem Relation (Age of Onset)    Breast cancer Sister    Diabetes Mother    Heart disease Mother    Hypertension Mother, Father    Kidney disease Father          Tobacco Use    Smoking status: Never Smoker    Smokeless tobacco: Never Used   Substance and Sexual Activity    Alcohol use: No    Drug use: No    Sexual activity: Not Currently     Review of Systems   Constitutional: Positive for diaphoresis and malaise/fatigue.   HENT: Negative.     Eyes: Negative.    Cardiovascular:  Positive for chest pain and dyspnea on exertion.   Respiratory:  Positive for shortness of breath.    Endocrine: Negative.    Hematologic/Lymphatic: Negative.    Skin: Negative.    Musculoskeletal: Negative.    Gastrointestinal: Negative.    Genitourinary: Negative.    Neurological: Negative.    Psychiatric/Behavioral: Negative.     Allergic/Immunologic: Negative.    Objective:     Vital Signs (Most Recent):  Temp: 98.2 °F (36.8 °C) (05/24/22 0848)  Pulse: 93 (05/24/22 0905)  Resp: (!) 27 (05/24/22 0905)  BP: 120/70 (05/24/22 0848)  SpO2: 98 % (05/24/22 0905)   Vital Signs (24h Range):  Temp:  [98.2 °F (36.8 °C)] 98.2 °F (36.8 °C)  Pulse:  [90-93] 93  Resp:  [19-27] 27  SpO2:  [98 %] 98 %  BP: (120)/(70) 120/70     Weight: 57.4 kg (126 lb 8.7 oz)  Body mass index is 23.91 kg/m².    SpO2: 98 %  O2 Device (Oxygen Therapy): nasal cannula    No intake or output data in the 24 hours ending 05/24/22 0908    Lines/Drains/Airways       None                   Physical Exam  Vitals and nursing note reviewed.   Constitutional:       General: She is not in acute distress.     Appearance: She is well-developed. She is ill-appearing and diaphoretic.      Comments: Distressed   HENT:      Head: Normocephalic and atraumatic.   Eyes:      General:         Right eye: No discharge.         Left eye: No discharge.      Pupils: Pupils are equal, round, and reactive to light.   Neck:      Thyroid: No thyromegaly.      Vascular: No JVD.      Trachea:  No tracheal deviation.   Cardiovascular:      Rate and Rhythm: Normal rate and regular rhythm.      Chest Wall: PMI is not displaced.      Pulses: Intact distal pulses.      Heart sounds: Normal heart sounds, S1 normal and S2 normal. No murmur heard.  Pulmonary:      Effort: Pulmonary effort is normal. No respiratory distress.      Breath sounds: Normal breath sounds. No wheezing or rales.   Abdominal:      General: There is no distension.      Tenderness: There is no rebound.   Musculoskeletal:      Cervical back: Neck supple.      Right lower leg: No edema.      Left lower leg: No edema.   Skin:     General: Skin is warm.      Findings: No erythema.      Comments: Bruising noted to left wrist   Neurological:      General: No focal deficit present.      Mental Status: She is alert and oriented to person, place, and time.   Psychiatric:         Mood and Affect: Mood normal.         Behavior: Behavior normal.         Thought Content: Thought content normal.       Significant Labs: CMP No results for input(s): NA, K, CL, CO2, GLU, BUN, CREATININE, CALCIUM, PROT, ALBUMIN, BILITOT, ALKPHOS, AST, ALT, ANIONGAP, ESTGFRAFRICA, EGFRNONAA in the last 48 hours., CBC No results for input(s): WBC, HGB, HCT, PLT in the last 48 hours., Troponin No results for input(s): TROPONINI in the last 48 hours., and All pertinent lab results from the last 24 hours have been reviewed.    Significant Imaging: Echocardiogram: Transthoracic echo (TTE) complete ([facility] Only):   Results for orders placed or performed during the hospital encounter of 05/19/22   Echo   Result Value Ref Range    BSA 1.61 m2    TDI SEPTAL 0.03 m/s    LV LATERAL E/E' RATIO 12.50 m/s    LV SEPTAL E/E' RATIO 16.67 m/s    IVC diameter 2.27 cm    Left Ventricular Outflow Tract Mean Velocity 0.26300235175 cm/s    Left Ventricular Outflow Tract Mean Gradient 1.54 mmHg    TDI LATERAL 0.04 m/s    LVIDd 3.61 3.5 - 6.0 cm    IVS 1.30 (A) 0.6 - 1.1 cm    Posterior Wall 1.30 (A)  0.6 - 1.1 cm    Ao root annulus 2.68 cm    LVIDs 2.60 2.1 - 4.0 cm    FS 28 28 - 44 %    Sinus 2.55 cm    STJ 2.63 cm    Ascending aorta 2.39 cm    LV mass 160.71 g    LA size 3.92 cm    TAPSE 1.43 cm    Left Ventricle Relative Wall Thickness 0.72 cm    AV regurgitation pressure 1/2 time 492.770688125472136 ms    AV mean gradient 5 mmHg    AV valve area 1.57 cm2    AV Velocity Ratio 0.62     AV index (prosthetic) 0.64     MV mean gradient 1 mmHg    MV valve area by continuity eq 2.33 cm2    E/A ratio 0.63     Mean e' 0.04 m/s    E wave deceleration time 291.039107885341494 msec    IVRT 125.261126486001168 msec    LVOT diameter 1.77 cm    LVOT area 2.5 cm2    LVOT peak mehrdad 0.93 m/s    LVOT peak VTI 24.40 cm    Ao peak mehrdad 1.50 m/s    Ao VTI 38.3 cm    RVOT peak mehrdad 0.74 m/s    RVOT peak VTI 19.0 cm    Mr max mehrdad 4.26 m/s    LVOT stroke volume 60.01 cm3    AV peak gradient 9 mmHg    MV peak gradient 2 mmHg    PV mean gradient 1.30 mmHg    E/E' ratio 14.29 m/s    MV Peak E Mehrdad 0.50 m/s    AR Max Mehrdad 3.66 m/s    TR Max Mehrdad 3.18 m/s    MV VTI 25.8 cm    MV Peak A Mehrdad 0.79 m/s    LV Systolic Volume 24.52 mL    LV Systolic Volume Index 15.4 mL/m2    LV Diastolic Volume 54.68 mL    LV Diastolic Volume Index 34.39 mL/m2    LV Mass Index 101 g/m2    RA Major Axis 4.03 cm    Left Atrium Minor Axis 5.00 cm    Left Atrium Major Axis 4.99 cm    Triscuspid Valve Regurgitation Peak Gradient 40 mmHg    RA Width 2.72 cm    Right Atrial Pressure (from IVC) 15 mmHg    EF 30 %    TV rest pulmonary artery pressure 55 mmHg    Narrative    · The left ventricle is normal in size with concentric hypertrophy and   moderately decreased systolic function.  · Grade I left ventricular diastolic dysfunction.  · The estimated PA systolic pressure is 55 mmHg.  · Normal right ventricular size with normal right ventricular systolic   function.  · There is pulmonary hypertension.  · Elevated central venous pressure (15 mmHg).  · The estimated ejection  fraction is 30%.  · There are segmental left ventricular wall motion abnormalities.  · Mild aortic regurgitation.  · Mild mitral regurgitation.  · Mild tricuspid regurgitation.      , EKG: Reviewed, and X-Ray: CXR: X-Ray Chest 1 View (CXR): No results found for this visit on 05/24/22. and X-Ray Chest PA and Lateral (CXR): No results found for this visit on 05/24/22.    Assessment and Plan:     Stress-induced cardiomyopathy  -EF 40% by last admission, thought to be stress-induced CMPY  -Resume Coreg, ARB as tolerated    STEMI (ST elevation myocardial infarction)  -Presents with substernal heavy, tight chest pain onset this AM  -Associated with profuse diaphoresis  -Recent admission with NSTEMI s/p LHC on 5/20/22 which showed normal coronaries  -Received ASA, heparin bolus, and nitro in ED  -Taken emergently to cath lab for primary PCI. All risks, benefits, and treatment alternatives explained to patient and family. Agreeable with proceeding.  -Further rec's to follow.    Type 2 diabetes mellitus without complication, without long-term current use of insulin  -Mgmt as per hospital medicine    Hyperlipidemia LDL goal <100  -Statin    Essential hypertension  -Continue home meds as tolerated          VTE Risk Mitigation (From admission, onward)         Ordered     heparin 25,000 units in dextrose 5% (100 units/ml) IV bolus from bag - ADDITIONAL PRN BOLUS - 60 units/kg (max bolus 4000 units)  As needed (PRN)        Question:  Heparin Infusion Adjustment (DO NOT MODIFY ANSWER)  Answer:  \\[email or web address]\Images\Pharmacy\HeparinInfusions\heparin LOW INTENSITY nomogram for [facility] [id number]    05/24/22 1000     heparin 25,000 units in dextrose 5% (100 units/ml) IV bolus from bag - ADDITIONAL PRN BOLUS - 30 units/kg (max bolus 4000 units)  As needed (PRN)        Question:  Heparin Infusion Adjustment (DO NOT MODIFY ANSWER)  Answer:  \\[facility]\Images\Pharmacy\HeparinInfusions\heparin LOW INTENSITY nomogram for [facility]  [id number]    05/24/22 1000     heparin 25,000 units in dextrose 5% (100 units/ml) IV bolus from bag INITIAL BOLUS (max bolus 4000 units)  Once        Question:  Heparin Infusion Adjustment (DO NOT MODIFY ANSWER)  Answer:  \\[email or web address]\Images\Pharmacy\HeparinInfusions\heparin LOW INTENSITY nomogram for [facility] [id number]    05/24/22 1000     heparin 25,000 units in dextrose 5% 250 mL (100 units/mL) infusion LOW INTENSITY nomogram - [facility]  Continuous        Question Answer Comment   Heparin Infusion Adjustment (DO NOT MODIFY ANSWER) \\[email or web address]\Images\Pharmacy\HeparinInfusions\heparin LOW INTENSITY nomogram for [facility] [id number]    Begin at (in units/kg/hr) 12        05/24/22 1000                Meka Franklin PA-C  Cardiology   '[place] - Intensive Care ([facility])

## 2022-05-24 NOTE — PLAN OF CARE
[facility] - [facility] Care (Hospital)  Initial Discharge Assessment       Primary Care Provider: Angeles Carson MD    Admission Diagnosis: Chest pain [R07.9]  ST elevation myocardial infarction (STEMI), unspecified artery [I21.3]  Acute ST elevation myocardial infarction (STEMI), unspecified artery [I21.3]    Admission Date: 5/24/2022  Expected Discharge Date:     Discharge Barriers Identified: None    Payor: MEDICARE / Plan: MEDICARE PART A & B / Product Type: Government /     Extended Emergency Contact Information  Primary Emergency Contact: Vicki Puentes   [facility]  Home Phone: [phone]  Relation: Daughter  Secondary Emergency Contact: charisma,amy  Mobile Phone: [phone]  Relation: Grandchild  Preferred language: English   needed? No    Discharge Plan A: Home Health         [facility] - [place], [place] - [address] [address]  [address]  [place] [address]  Phone: [phone] Fax: [phone]    [id number] [place] [facility] [address]  [address]  [facility] [place] [address]  Phone: [phone] Fax: [phone]      Initial Assessment (most recent)     Adult Discharge Assessment - 05/24/22 1212        Discharge Assessment    Assessment Type Discharge Planning Assessment     Confirmed/corrected address, phone number and insurance Yes     Confirmed Demographics Correct on Facesheet     Source of Information patient     Communicated JOSE with patient/caregiver Date not available/Unable to determine     Reason For Admission NSTEMI     Lives With child(godwin), adult     Facility Arrived From: home     Do you expect to return to your current living situation? Yes     Do you have help at home or someone to help you manage your care at home? Yes     Who are your caregiver(s) and their phone number(s)? Vicki Puentes (Daughter)     Prior to hospitilization cognitive status: Alert/Oriented     Current cognitive status: Alert/Oriented     Walking or  Climbing Stairs Difficulty ambulation difficulty, requires equipment     Dressing/Bathing Difficulty none     Home Accessibility wheelchair accessible     Home Layout Able to live on 1st floor     Equipment Currently Used at Home cane, straight;bedside commode;walker, rolling     Readmission within 30 days? No     Patient currently being followed by outpatient case management? No     Do you currently have service(s) that help you manage your care at home? Yes     Name and Contact number of agency [facility]     Is the pt/caregiver preference to resume services with current agency Yes     Do you take prescription medications? Yes     Do you have prescription coverage? Yes     Do you have any problems affording any of your prescribed medications? No     Is the patient taking medications as prescribed? yes     Who is going to help you get home at discharge? Vicki Puentes (Daughter)     How do you get to doctors appointments? family or friend will provide     Are you on dialysis? No     Do you take coumadin? No     Discharge Plan A Home Health     DME Needed Upon Discharge  none     Discharge Plan discussed with: Patient     Discharge Barriers Identified None        Relationship/Environment    Name(s) of Who Lives With Patient Vicki Puentes (Daughter)               Anticipated DC dispo: home health (current with [facility])  Prior Level of Function: independent, lives with daughter   PCP: Angeles Carson MD    Comments:  CM met with patient at bedside to introduce role and discuss discharge planning. Patient lives with her daughter who will also be help at home and can provide transport at time of discharge. Current with [facility]. CM discharge needs depends on hospital progress. CM will continue following to assist with other needs.

## 2022-05-24 NOTE — Clinical Note
The catheter was inserted into the ostium   right coronary artery. An angiography was performed of the right coronary arteries. Multiple views were taken.  Breath sounds clear and equal bilaterally.

## 2022-05-24 NOTE — NURSING
L groin art sheath pulled at 1400; pressure held for 30 mins until 1430; no issues. No signs of bleeding/hematoma. Skin surrounding site soft. Gauze/tegaderm dressing applied. Pedal pulses via palpation and doppler. Will continue to monitor site. VS stable.

## 2022-05-24 NOTE — PLAN OF CARE
05/24/22 1223   Readmission   Why were you hospitalized in the last 30 days? heart attack   Why were you readmitted? Alarmed about signs/symptoms   When you left the hospital how did you feel? ok   When you left the hospital where did you go? Home with Home Health   Did patient/caregiver refused recommended DC plan? No   Tell me about what happened between when you left the hospital and the day you returned. chest started hurting   When did you start not feeling well? yesterday   Did you call anyone? Yes   Who did you call?   (EMS)   Did you try to see or did see a doctor or nurse before you came? No   Did you have  a follow-up appointment on discharge? No   Was this a planned readmission? Yes

## 2022-05-24 NOTE — CONSULTS
"[facility] ([facility])  Critical Care Medicine  Consult Note    Patient Name: Leslie Wood  MRN: [id number]  Admission Date: 2022  Hospital Length of Stay: 0 days  Code Status: Prior  Attending Physician: Domingo Martins MD   Primary Care Provider: Angeles Carson MD   Principal Problem: <principal problem not specified>    Subjective:     HPI:  81 year old female with PMHx of DM II, GERD, HLD, CVA, and HTN who of note was admitted here - due to chest pain/NSTEMI following  for her brother. She underwent LHC on  revealing normal coronary arteries and EF 40% concerning for takotsubo cardiomyopathy    She returned to [facility] this am. She was preparing for follow up cardiology clinic appointment when she told family she didn't "feel well" and noted that she had not slept due to "feeling hot" overnight  ED EKG showed acute MI and she was taken directly to [facility]  LHC revealed Severe mid LAD bridging; chest pain and EKG improved with BB therapy      Hospital/ICU Course:  Admitted to ICU from cath lab with sheath in place to left femoral artery      Past Medical History:   Diagnosis Date    Arthritis     Cataract     GERD (gastroesophageal reflux disease)     Hyperlipidemia     Hypertension     Stroke        Past Surgical History:   Procedure Laterality Date    ADENOIDECTOMY      ADRENAL GLAND SURGERY      APPENDECTOMY      BACK SURGERY      fusion l 4-5 s 1,2,3  fusion l 2-3    CORONARY ANGIOGRAPHY N/A 2022    Procedure: ANGIOGRAM, CORONARY ARTERY;  Surgeon: Domingo Martins MD;  Location: [facility] CATH LAB;  Service: Cardiology;  Laterality: N/A;    EYE SURGERY      HEMORRHOID SURGERY      HERNIA REPAIR      HYSTERECTOMY      indirect lumbar decompression      percutaneous placement of interspinous extension blocker    LEFT HEART CATHETERIZATION Left 2022    Procedure: CATHETERIZATION, HEART, LEFT;  Surgeon: Domingo Martins MD;  Location: [facility] CATH LAB;  " Service: Cardiology;  Laterality: Left;    TONSILLECTOMY         Review of patient's allergies indicates:   Allergen Reactions    Prazosin Other (See Comments)     dizziness    Amitriptyline     Hydrochlorothiazide      Causes muscle cramping    Lisinopril      hyperkalemia    Percocet [oxycodone-acetaminophen] Other (See Comments)     Seizures    Codeine Nausea Only and Rash       Family History       Problem Relation (Age of Onset)    Breast cancer Sister    Diabetes Mother    Heart disease Mother    Hypertension Mother, Father    Kidney disease Father          Tobacco Use    Smoking status: Never Smoker    Smokeless tobacco: Never Used   Substance and Sexual Activity    Alcohol use: No    Drug use: No    Sexual activity: Not Currently         Review of Systems   Constitutional:  Positive for fatigue. Negative for diaphoresis.   Respiratory:  Negative for chest tightness and shortness of breath.    Cardiovascular:  Negative for chest pain, palpitations and leg swelling.   Gastrointestinal:  Negative for abdominal pain.   Genitourinary: Negative.    Musculoskeletal: Negative.    Skin:         bruising   Neurological:  Negative for speech difficulty and headaches.   Hematological:  Bruises/bleeds easily.   Psychiatric/Behavioral:  The patient is not nervous/anxious.    Objective:     Vital Signs (Most Recent):  Temp: 98.2 °F (36.8 °C) (05/24/22 1101)  Pulse: 65 (05/24/22 1400)  Resp: (!) 35 (05/24/22 1400)  BP: 125/68 (05/24/22 1400)  SpO2: 99 % (05/24/22 1400)   Vital Signs (24h Range):  Temp:  [98.1 °F (36.7 °C)-98.2 °F (36.8 °C)] 98.2 °F (36.8 °C)  Pulse:  [61-93] 65  Resp:  [19-35] 35  SpO2:  [92 %-99 %] 99 %  BP: (106-142)/(53-92) 125/68     Weight: 57.4 kg (126 lb 8.7 oz)  Body mass index is 23.91 kg/m².      Intake/Output Summary (Last 24 hours) at 5/24/2022 1430  Last data filed at 5/24/2022 1400  Gross per 24 hour   Intake 444.73 ml   Output --   Net 444.73 ml      heparin (porcine) in D5W          Physical Exam  Vitals and nursing note reviewed.   Constitutional:       General: She is awake. She is not in acute distress.     Appearance: She is not toxic-appearing.   HENT:      Head: Atraumatic.   Eyes:      Conjunctiva/sclera: Conjunctivae normal.   Neck:      Vascular: No JVD.   Cardiovascular:      Rate and Rhythm: Normal rate and regular rhythm.      Pulses:           Radial pulses are 2+ on the right side and 2+ on the left side.        Dorsalis pedis pulses are 1+ on the right side and 1+ on the left side.   Pulmonary:      Effort: Pulmonary effort is normal.      Breath sounds: Normal breath sounds.   Abdominal:      General: Bowel sounds are normal. There is no distension.      Palpations: Abdomen is soft.   Musculoskeletal:      Right lower leg: No edema.      Left lower leg: No edema.   Skin:     General: Skin is warm and dry.      Capillary Refill: Capillary refill takes 2 to 3 seconds.          Neurological:      Mental Status: She is alert and oriented to person, place, and time.      GCS: GCS eye subscore is 4. GCS verbal subscore is 5. GCS motor subscore is 6.   Psychiatric:         Mood and Affect: Mood normal.         Speech: Speech normal.         Behavior: Behavior is cooperative.         Thought Content: Thought content normal.         Cognition and Memory: She exhibits impaired recent memory.         Judgment: Judgment is not impulsive or inappropriate.       Vents:  Oxygen Concentration (%): 28 (05/24/22 0905)    Lines/Drains/Airways       Drain  Duration                  Sheath 05/24/22  Left <1 day    Female External Urinary Catheter 05/24/22 1120 <1 day              Peripheral Intravenous Line  Duration                  Peripheral IV - Single Lumen 05/24/22 0902 18 G Right Antecubital <1 day                    Significant Labs:    CBC/Anemia Profile:  Recent Labs   Lab 05/24/22  0918 05/24/22  1025   WBC 9.92 9.06   HGB 13.1 12.0   HCT 40.1 37.7    258   MCV 90 93   RDW  13.6 13.5        Chemistries:  Recent Labs   Lab 05/24/22  0918   *   K 3.4*      CO2 23   BUN 19   CREATININE 1.0   CALCIUM 9.4   ALBUMIN 3.6   PROT 7.2   BILITOT 0.9   ALKPHOS 73   ALT 47*   AST 49*       All pertinent labs within the past 24 hours have been reviewed.    Significant Imaging:   I have reviewed all pertinent imaging results/findings within the past 24 hours.      ABG  No results for input(s): PH, PO2, PCO2, HCO3, BE in the last 168 hours.  Assessment/Plan:     Cardiac/Vascular  Stress-induced cardiomyopathy  continue coreg, ARB as tolerated    STEMI (ST elevation myocardial infarction)  LAD bridging Not amenable to PCI  Continue medical management with ASA, plavix, heparin, BB  Note recommends eval for CABG if unable to stabilize with medical therapy    Endocrine  Type 2 diabetes mellitus without complication, without long-term current use of insulin  SSI prn with monitoring for glucose control and prevention of insulin toxicity        Critical Care Daily Checklist:    A: Awake: RASS Goal/Actual Goal:    Actual: [place] Agitation Sedation Scale (RASS): Alert and calm   B: Spontaneous Breathing Trial Performed?     C: SAT & SBT Coordinated?  n/a                      D: Delirium: CAM-ICU Overall CAM-ICU: Negative   E: Early Mobility Performed? Yes   F: Feeding Goal:    Status:     Current Diet Order   Procedures    Diet Cardiac [facility]; Consistent Carbohydrate; Isolation Tray - Regular [place]     Order Specific Question:   Indicate patient location for additional diet options:     Answer:   [facility]     Order Specific Question:   Additional Diet Options:     Answer:   Consistent Carbohydrate     Order Specific Question:   Tray type:     Answer:   Isolation Tray - Regular [place]      AS: Analgesia/Sedation prn   T: Thromboembolic Prophylaxis heparin   H: HOB > 300 Yes   U: Stress Ulcer Prophylaxis (if needed) pepcid   G: Glucose Control SSI   B: Bowel Function Stool  Occurrence: 0   I: Indwelling Catheter (Lines & [employer]) Necessity reviewed   D: De-escalation of Antimicrobials/Pharmacotherapies reviewed    Plan for the day/ETD As above    Family/Goals of Care: Home on discharge   I have discussed case and plan of care in detail with Dr Martins; Status and plan of care were discussed with team on multidisciplinary rounds.    Critical Care Time: 40 minutes  Critical secondary to STEMI; Critical care was time spent personally by me on the following activities: development of treatment plan with patient or surrogate and bedside caregivers, discussions with consultants, evaluation of patient's response to treatment, examination of patient, ordering and performing treatments and interventions, ordering and review of laboratory studies, ordering and review of radiographic studies, pulse oximetry, re-evaluation of patient's condition. This critical care time did not overlap with that of any other provider or involve time for any procedures.    Thank you for your consult.      SYLVIA Zapata-BC  Critical Care Medicine  [facility] ([facility])

## 2022-05-24 NOTE — SUBJECTIVE & OBJECTIVE
Past Medical History:   Diagnosis Date    Arthritis     Cataract     GERD (gastroesophageal reflux disease)     Hyperlipidemia     Hypertension     Stroke        Past Surgical History:   Procedure Laterality Date    ADENOIDECTOMY      ADRENAL GLAND SURGERY      APPENDECTOMY      BACK SURGERY      fusion l 4-5 s 1,2,3  fusion l 2-3    CORONARY ANGIOGRAPHY N/A 5/20/2022    Procedure: ANGIOGRAM, CORONARY ARTERY;  Surgeon: Domingo Martins MD;  Location: [facility] CATH LAB;  Service: Cardiology;  Laterality: N/A;    EYE SURGERY      HEMORRHOID SURGERY      HERNIA REPAIR      HYSTERECTOMY      indirect lumbar decompression      percutaneous placement of interspinous extension blocker    LEFT HEART CATHETERIZATION Left 5/20/2022    Procedure: CATHETERIZATION, HEART, LEFT;  Surgeon: Domingo Martins MD;  Location: [facility] CATH LAB;  Service: Cardiology;  Laterality: Left;    TONSILLECTOMY         Review of patient's allergies indicates:   Allergen Reactions    Prazosin Other (See Comments)     dizziness    Amitriptyline     Hydrochlorothiazide      Causes muscle cramping    Lisinopril      hyperkalemia    Percocet [oxycodone-acetaminophen] Other (See Comments)     Seizures    Codeine Nausea Only and Rash       Family History       Problem Relation (Age of Onset)    Breast cancer Sister    Diabetes Mother    Heart disease Mother    Hypertension Mother, Father    Kidney disease Father          Tobacco Use    Smoking status: Never Smoker    Smokeless tobacco: Never Used   Substance and Sexual Activity    Alcohol use: No    Drug use: No    Sexual activity: Not Currently         Review of Systems   Constitutional:  Positive for fatigue. Negative for diaphoresis.   Respiratory:  Negative for chest tightness and shortness of breath.    Cardiovascular:  Negative for chest pain, palpitations and leg swelling.   Gastrointestinal:  Negative for abdominal pain.   Genitourinary: Negative.    Musculoskeletal: Negative.    Skin:          bruising   Neurological:  Negative for speech difficulty and headaches.   Hematological:  Bruises/bleeds easily.   Psychiatric/Behavioral:  The patient is not nervous/anxious.    Objective:     Vital Signs (Most Recent):  Temp: 98.2 °F (36.8 °C) (05/24/22 1101)  Pulse: 65 (05/24/22 1400)  Resp: (!) 35 (05/24/22 1400)  BP: 125/68 (05/24/22 1400)  SpO2: 99 % (05/24/22 1400)   Vital Signs (24h Range):  Temp:  [98.1 °F (36.7 °C)-98.2 °F (36.8 °C)] 98.2 °F (36.8 °C)  Pulse:  [61-93] 65  Resp:  [19-35] 35  SpO2:  [92 %-99 %] 99 %  BP: (106-142)/(53-92) 125/68     Weight: 57.4 kg (126 lb 8.7 oz)  Body mass index is 23.91 kg/m².      Intake/Output Summary (Last 24 hours) at 5/24/2022 1430  Last data filed at 5/24/2022 1400  Gross per 24 hour   Intake 444.73 ml   Output --   Net 444.73 ml      heparin (porcine) in D5W         Physical Exam  Vitals and nursing note reviewed.   Constitutional:       General: She is awake. She is not in acute distress.     Appearance: She is not toxic-appearing.   HENT:      Head: Atraumatic.   Eyes:      Conjunctiva/sclera: Conjunctivae normal.   Neck:      Vascular: No JVD.   Cardiovascular:      Rate and Rhythm: Normal rate and regular rhythm.      Pulses:           Radial pulses are 2+ on the right side and 2+ on the left side.        Dorsalis pedis pulses are 1+ on the right side and 1+ on the left side.   Pulmonary:      Effort: Pulmonary effort is normal.      Breath sounds: Normal breath sounds.   Abdominal:      General: Bowel sounds are normal. There is no distension.      Palpations: Abdomen is soft.   Musculoskeletal:      Right lower leg: No edema.      Left lower leg: No edema.   Skin:     General: Skin is warm and dry.      Capillary Refill: Capillary refill takes 2 to 3 seconds.          Neurological:      Mental Status: She is alert and oriented to person, place, and time.      GCS: GCS eye subscore is 4. GCS verbal subscore is 5. GCS motor subscore is 6.   Psychiatric:          Mood and Affect: Mood normal.         Speech: Speech normal.         Behavior: Behavior is cooperative.         Thought Content: Thought content normal.         Cognition and Memory: She exhibits impaired recent memory.         Judgment: Judgment is not impulsive or inappropriate.       Vents:  Oxygen Concentration (%): 28 (05/24/22 0905)    Lines/Drains/Airways       Drain  Duration                  Sheath 05/24/22  Left <1 day    Female External Urinary Catheter 05/24/22 1120 <1 day              Peripheral Intravenous Line  Duration                  Peripheral IV - Single Lumen 05/24/22 0902 18 G Right Antecubital <1 day                    Significant Labs:    CBC/Anemia Profile:  Recent Labs   Lab 05/24/22 0918 05/24/22  1025   WBC 9.92 9.06   HGB 13.1 12.0   HCT 40.1 37.7    258   MCV 90 93   RDW 13.6 13.5        Chemistries:  Recent Labs   Lab 05/24/22 0918   *   K 3.4*      CO2 23   BUN 19   CREATININE 1.0   CALCIUM 9.4   ALBUMIN 3.6   PROT 7.2   BILITOT 0.9   ALKPHOS 73   ALT 47*   AST 49*       All pertinent labs within the past 24 hours have been reviewed.    Significant Imaging:   I have reviewed all pertinent imaging results/findings within the past 24 hours.

## 2022-05-24 NOTE — HOSPITAL COURSE
Admitted to ICU from cath lab with sheath in place to left femoral artery  5/25 - no acute events overnight. RA with VSS

## 2022-05-24 NOTE — ED NOTES
Pt presents to ED c/o chest tightness and diaphoresis beginning this morning. Denies pain, just uncomfortable tightness. PT also states taking all morning meds prior to ED arrival .

## 2022-05-24 NOTE — Clinical Note
The left groin was prepped. The site was prepped with ChloraPrep. The site was clipped. The patient was draped. The patient was positioned supine.

## 2022-05-24 NOTE — BRIEF OP NOTE
[facility] - [facility] Lab (Hospital)  Brief Operative Note  Cardiology    SUMMARY     Surgery Date: 5/24/2022     Surgeon(s) and Role:     * Domingo Martins MD - Primary    Assisting Surgeon: None    Pre-op Diagnosis:  ST elevation myocardial infarction (STEMI), unspecified artery [I21.3]    Post-op Diagnosis: Post-Op Diagnosis Codes:     * ST elevation myocardial infarction (STEMI), unspecified artery [I21.3]    Procedure Performed:    Procedure(s) (LRB):  ANGIOGRAM, CORONARY ARTERY (N/A)    Technical Procedures Used:   Severe mid LAD bridging , improved partially with betablocker as well as ekg   NO ACUTE ATHEROTHROMBOTIC LESIONS IDENTIFIED     Medical treatment with bb or ccb , if fails medical treatment, would evaluated for single vessel cabg     Estimated Blood Loss: * No values recorded between 5/24/2022  9:16 AM and 5/24/2022  9:35 AM *         Specimens:   Specimen (24h ago, onward)            None

## 2022-05-24 NOTE — ED PROVIDER NOTES
SCRIBE #1 NOTE: I, Lisa Hendricks, am scribing for, and in the presence of, Cindy Jacinto MD. I have scribed the entire note.      History      Chief Complaint   Patient presents with    Chest Pain     States started this morning with chest pain and had night sweats. Also having back pain. Recent NSTEMI       Review of patient's allergies indicates:   Allergen Reactions    Prazosin Other (See Comments)     dizziness    Amitriptyline     Hydrochlorothiazide      Causes muscle cramping    Lisinopril      hyperkalemia    Percocet [oxycodone-acetaminophen] Other (See Comments)     Seizures    Codeine Nausea Only and Rash        HPI   HPI    5/24/2022, 9:02 AM   History obtained from the patient      History of Present Illness: Leslie Wood is a 81 y.o. female patient with a PMHx of NSTEMI, HLD, HTN, stroke, and GERD who presents to the Emergency Department for CP which onset gradually this morning. The pt was admitted at this facility from 5/19/2022 to 5/21/2022 for an NSTEMI and had a left heart catheterization performed by Dr. Martins (Cardiology) on 5/20/2022. Last night, the pt reports that she was diaphoretic all night. This morning, the pt was supposed to have a f/u appointment with Dr. Martins (Cardiology), but she developed CP, so she came to the ED. She denies any active CP. The pt c/o chest tightness. She rates her chest tightness as a 6-7/10 on a tightness scale.  Symptoms are constant and moderate in severity. No mitigating or exacerbating factors reported. Associated sxs include diaphoresis, nausea, SOB, and chest tightness. Patient denies any fever, chills, V/D, weakness, dizziness, headaches, and all other sxs at this time. No prior Tx reported. The pt reports that she took her morning medications this morning. She denies taking NTG this morning. No further complaints or concerns at this time.         Arrival mode: Personal vehicle     PCP: Angeles Carson MD       Past Medical History:  Past  Medical History:   Diagnosis Date    Arthritis     Cataract     GERD (gastroesophageal reflux disease)     Hyperlipidemia     Hypertension     Stroke        Past Surgical History:  Past Surgical History:   Procedure Laterality Date    ADENOIDECTOMY      ADRENAL GLAND SURGERY      APPENDECTOMY      BACK SURGERY      fusion l 4-5 s 1,2,3  fusion l 2-3    CORONARY ANGIOGRAPHY N/A 5/20/2022    Procedure: ANGIOGRAM, CORONARY ARTERY;  Surgeon: Domingo Martins MD;  Location: [facility] CATH LAB;  Service: Cardiology;  Laterality: N/A;    EYE SURGERY      HEMORRHOID SURGERY      HERNIA REPAIR      HYSTERECTOMY      indirect lumbar decompression      percutaneous placement of interspinous extension blocker    LEFT HEART CATHETERIZATION Left 5/20/2022    Procedure: CATHETERIZATION, HEART, LEFT;  Surgeon: Domingo Martins MD;  Location: [facility] CATH LAB;  Service: Cardiology;  Laterality: Left;    TONSILLECTOMY           Family History:  Family History   Problem Relation Age of Onset    Heart disease Mother     Hypertension Mother     Diabetes Mother     Hypertension Father     Kidney disease Father     Breast cancer Sister        Social History:  Social History     Tobacco Use    Smoking status: Never Smoker    Smokeless tobacco: Never Used   Substance and Sexual Activity    Alcohol use: No    Drug use: No    Sexual activity: Not Currently       ROS   Review of Systems   Constitutional: Positive for diaphoresis. Negative for chills and fever.   HENT: Negative for sore throat.    Respiratory: Positive for chest tightness and shortness of breath.    Cardiovascular: Positive for chest pain (not active).   Gastrointestinal: Positive for nausea. Negative for diarrhea and vomiting.   Genitourinary: Negative for dysuria.   Musculoskeletal: Negative for myalgias.   Skin: Negative for rash.   Neurological: Negative for dizziness, weakness and headaches.   Hematological: Does not bruise/bleed easily.   All other  systems reviewed and are negative.      Physical Exam      Initial Vitals [05/24/22 0848]   BP Pulse Resp Temp SpO2   120/70 90 19 98.2 °F (36.8 °C) 98 %      MAP       --          Physical Exam  Nursing Notes and Vital Signs Reviewed.  Constitutional: Patient is in no acute distress. Well-developed and well-nourished.  Head: Atraumatic. Normocephalic.  Eyes: PERRL. EOM intact. Conjunctivae are not pale. No scleral icterus.  ENT: Mucous membranes are moist. Oropharynx is clear and symmetric.    Neck: Supple. Full ROM. No lymphadenopathy.  Cardiovascular: Regular rate. Regular rhythm. No murmurs, rubs, or gallops. Distal pulses are 2+ and symmetric.  Pulmonary/Chest: No respiratory distress. Clear to auscultation bilaterally. No wheezing or rales.  Abdominal: Soft and non-distended.  There is no tenderness.  No rebound, guarding, or rigidity.   Musculoskeletal: Moves all extremities. No obvious deformities. No edema.  Skin: Warm and dry.  Neurological:  Alert, awake, and appropriate.  Normal speech.  No acute focal neurological deficits are appreciated.  Psychiatric: Pt is anxious. Good eye contact. Appropriate in content.    ED Course    Critical Care    Date/Time: 5/24/2022 10:16 AM  Performed by: Cindy Jacinto MD  Authorized by: Domingo Martins MD   Direct patient critical care time: 15 minutes  Additional history critical care time: 8 minutes  Ordering / reviewing critical care time: 5 minutes  Documentation critical care time: 5 minutes  Consulting other physicians critical care time: 5 minutes  Total critical care time (exclusive of procedural time) : 38 minutes  Critical care was necessary to treat or prevent imminent or life-threatening deterioration of the following conditions: cardiac failure.  Critical care was time spent personally by me on the following activities: blood draw for specimens, discussions with primary provider, evaluation of patient's response to treatment, ordering and performing  "treatments and interventions, pulse oximetry, re-evaluation of patient's condition, examination of patient, development of treatment plan with patient or surrogate, ordering and review of laboratory studies, review of old charts, obtaining history from patient or surrogate, interpretation of cardiac output measurements and discussions with consultants.        ED Vital Signs:  Vitals:    05/24/22 0848 05/24/22 0903 05/24/22 0904 05/24/22 0905   BP: 120/70 (!) 142/76     Pulse: 90  93 93   Resp: 19   (!) 27   Temp: 98.2 °F (36.8 °C)      TempSrc: Oral      SpO2: 98%  98% 98%   Weight: 57.4 kg (126 lb 8.7 oz)      Height:        05/24/22 1006 05/24/22 1015   BP: (!) 125/92    Pulse: 74 72   Resp: (!) 35 (!) 27   Temp: 98.1 °F (36.7 °C)    TempSrc: Oral    SpO2: 99% 98%   Weight: 57.4 kg (126 lb 8.7 oz)    Height: 5' 1" (1.549 m)        Abnormal Lab Results:  Labs Reviewed   B-TYPE NATRIURETIC PEPTIDE   SARS-COV-2 RDRP GENE          Imaging Results:  Imaging Results    None        The EKG was ordered, reviewed, and independently interpreted by the ED provider.  Interpretation time: 8:49  Rate: 87 BPM  Rhythm: normal sinus rhythm  Interpretation: Minimal voltage criteria for LVH, may be normal variant (R in aVL). Anteroseptal infarct (cited on or before 19-MAY-2022). Lateral injury pattern. Acute MI / STEMI            The Emergency Provider reviewed the vital signs and test results, which are outlined above.    ED Discussion   8:57 AM:  ECG reviewed, code stemi called, inferior and anterior wall elevation noted with reciprocal changes, of note patient just in the hospital and had left heart cath done 5/22    9:01 AM: Dr. Mendoza (Cardiology) is at bedside to evaluate the pt. [place] straight to cath lab Dr. Martins will repeat left heart cath    9:03 AM: Discussed case with Dr. Martins (Cardiology). Dr. Martins agrees with current care and management of pt and accepts admission.   Admitting Service: Cardiology  Admitting " Physician: Dr. Martins  Admit to: Inpatient    9:04 AM: Re-evaluated pt. I have discussed test results, shared treatment plan, and the need for admission with patient and family at bedside. Pt and family express understanding at this time and agree with all information. All questions answered. Pt and family have no further questions or concerns at this time. Pt is ready for admit.             ED Medication(s):  Medications   ondansetron disintegrating tablet 8 mg (has no administration in time range)   0.9%  NaCl infusion ( Intravenous New Bag 5/24/22 1018)   aspirin EC tablet 81 mg (has no administration in time range)   atorvastatin tablet 80 mg (has no administration in time range)   clopidogreL tablet 75 mg (has no administration in time range)   traZODone tablet 50 mg (has no administration in time range)   heparin 25,000 units in dextrose 5% (100 units/ml) IV bolus from bag INITIAL BOLUS (max bolus 4000 units) (has no administration in time range)   heparin 25,000 units in dextrose 5% 250 mL (100 units/mL) infusion LOW INTENSITY nomogram - [facility] (has no administration in time range)   heparin 25,000 units in dextrose 5% (100 units/ml) IV bolus from bag - ADDITIONAL PRN BOLUS - 60 units/kg (max bolus 4000 units) (has no administration in time range)   heparin 25,000 units in dextrose 5% (100 units/ml) IV bolus from bag - ADDITIONAL PRN BOLUS - 30 units/kg (max bolus 4000 units) (has no administration in time range)   metoprolol succinate (TOPROL-XL) 24 hr tablet 50 mg (has no administration in time range)   heparin (porcine) injection 4,000 Units (4,000 Units Intravenous Given 5/24/22 0903)   aspirin tablet 325 mg (325 mg Oral Given 5/24/22 0900)   nitroGLYCERIN SL tablet 0.4 mg (0.4 mg Sublingual Given 5/24/22 0900)           Current Discharge Medication List            Medical Decision Making    Medical Decision Making:   Clinical Tests:   Lab Tests: Ordered  Radiological Study: Ordered  Medical Tests: Ordered  and Reviewed           Scribe Attestation:   Scribe #1: I performed the above scribed service and the documentation accurately describes the services I performed. I attest to the accuracy of the note.    Attending:   Physician Attestation Statement for Scribe #1: I, Cindy Jacinto MD, personally performed the services described in this documentation, as scribed by Lisa Hendricks, in my presence, and it is both accurate and complete.          Clinical Impression       ICD-10-CM ICD-9-CM   1. Acute ST elevation myocardial infarction (STEMI), unspecified artery  I21.3 410.90   2. Chest pain  R07.9 786.50   3. ST elevation myocardial infarction (STEMI), unspecified artery  I21.3 410.90       Disposition:   Disposition: Admitted  Condition: Serious         Cindy Jacinto MD  05/24/22 1018

## 2022-05-24 NOTE — ASSESSMENT & PLAN NOTE
-Presents with substernal heavy, tight chest pain onset this AM  -Associated with profuse diaphoresis  -Recent admission with NSTEMI s/p [facility] on 5/20/22 which showed normal coronaries  -Received ASA, heparin bolus, and nitro in ED  -Taken emergently to cath lab for primary PCI. All risks, benefits, and treatment alternatives explained to patient and family. Agreeable with proceeding.  -Further rec's to follow.

## 2022-05-24 NOTE — PLAN OF CARE
Pt's VS stable since arrival to ICU. Room air; no resp issues. L groin arterial sheath pulled at 1400; hemostasis achieved at 1430; dressing C/D/I, no signs of bleeding or hematoma. SR via monitor. Normotensive. Pt remains flat per orders. Will restart heparin gtt at 1730 per MD order to start gtt 3 hours after sheath pulled and site stable. External pure wick catheter in place; no issues. Pt and pt's granddaughter updated on pt status and plan of care

## 2022-05-24 NOTE — HPI
Ms. 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 Associated symptoms included diaphoresis and SOB. Patient denied any associated fever, chills, palpitations, near syncope, or syncope. EKG performed in ED consistent with STEMI and patient taken urgently to cath lab for primary PCI.    She received sublingual nitro and 4,000 units of heparin in ED. Consent obtained from POA as patient was distressed/unable to sign.

## 2022-05-24 NOTE — ASSESSMENT & PLAN NOTE
LAD bridging Not amenable to PCI  Continue medical management with ASA, plavix, heparin, BB  Note recommends eval for CABG if unable to stabilize with medical therapy

## 2022-05-24 NOTE — SUBJECTIVE & OBJECTIVE
Past Medical History:   Diagnosis Date    Arthritis     Cataract     GERD (gastroesophageal reflux disease)     Hyperlipidemia     Hypertension     Stroke        Past Surgical History:   Procedure Laterality Date    ADENOIDECTOMY      ADRENAL GLAND SURGERY      APPENDECTOMY      BACK SURGERY      fusion l 4-5 s 1,2,3  fusion l 2-3    CORONARY ANGIOGRAPHY N/A 5/20/2022    Procedure: ANGIOGRAM, CORONARY ARTERY;  Surgeon: Domingo Martins MD;  Location: [facility] CATH LAB;  Service: Cardiology;  Laterality: N/A;    EYE SURGERY      HEMORRHOID SURGERY      HERNIA REPAIR      HYSTERECTOMY      indirect lumbar decompression      percutaneous placement of interspinous extension blocker    LEFT HEART CATHETERIZATION Left 5/20/2022    Procedure: CATHETERIZATION, HEART, LEFT;  Surgeon: Domingo Martins MD;  Location: [facility] CATH LAB;  Service: Cardiology;  Laterality: Left;    TONSILLECTOMY         Review of patient's allergies indicates:   Allergen Reactions    Prazosin Other (See Comments)     dizziness    Amitriptyline     Hydrochlorothiazide      Causes muscle cramping    Lisinopril      hyperkalemia    Percocet [oxycodone-acetaminophen] Other (See Comments)     Seizures    Codeine Nausea Only and Rash       No current facility-administered medications on file prior to encounter.     Current Outpatient Medications on File Prior to Encounter   Medication Sig    aspirin (ECOTRIN) 81 MG EC tablet Take 81 mg by mouth once daily.    atorvastatin (LIPITOR) 80 MG tablet Take 1 tablet (80 mg total) by mouth every evening.    clopidogreL (PLAVIX) 75 mg tablet Take 1 tablet (75 mg total) by mouth once daily.    diclofenac sodium (VOLTAREN) 1 % Gel Apply 2 g topically 3 (three) times daily.    furosemide (LASIX) 20 MG tablet Take 1 tablet (20 mg total) by mouth once daily.    metoprolol tartrate (LOPRESSOR) 25 MG tablet Take 1 tablet (25 mg total) by mouth 3 (three) times daily.    nitroGLYCERIN (NITROSTAT) 0.4 MG SL tablet Place 1  tablet (0.4 mg total) under the tongue every 5 (five) minutes as needed for Chest pain.    traZODone (DESYREL) 50 MG tablet TAKE 1 TABLET BY MOUTH ONCE AT BEDTIME.    [DISCONTINUED] carvediloL (COREG) 6.25 MG tablet Take 1 tablet (6.25 mg total) by mouth 2 (two) times daily. TAKE (1) TABLET BY MOUTH 2 TIMES A DAY WITH MEALS    [DISCONTINUED] cloNIDine (CATAPRES) 0.1 MG tablet Take 1 tablet (0.1 mg total) by mouth 2 (two) times daily. To take an extra dose if BP >160/90    [DISCONTINUED] isosorbide mononitrate (IMDUR) 30 MG 24 hr tablet TAKE 1 TABLET BY MOUTH TWICE A DAY    [DISCONTINUED] valsartan (DIOVAN) 160 MG tablet TAKE 1 TABLET BY MOUTH TWICE A DAY     Family History       Problem Relation (Age of Onset)    Breast cancer Sister    Diabetes Mother    Heart disease Mother    Hypertension Mother, Father    Kidney disease Father          Tobacco Use    Smoking status: Never Smoker    Smokeless tobacco: Never Used   Substance and Sexual Activity    Alcohol use: No    Drug use: No    Sexual activity: Not Currently     Review of Systems   Constitutional: Positive for diaphoresis and malaise/fatigue.   HENT: Negative.     Eyes: Negative.    Cardiovascular:  Positive for chest pain and dyspnea on exertion.   Respiratory:  Positive for shortness of breath.    Endocrine: Negative.    Hematologic/Lymphatic: Negative.    Skin: Negative.    Musculoskeletal: Negative.    Gastrointestinal: Negative.    Genitourinary: Negative.    Neurological: Negative.    Psychiatric/Behavioral: Negative.     Allergic/Immunologic: Negative.    Objective:     Vital Signs (Most Recent):  Temp: 98.2 °F (36.8 °C) (05/24/22 0848)  Pulse: 93 (05/24/22 0905)  Resp: (!) 27 (05/24/22 0905)  BP: 120/70 (05/24/22 0848)  SpO2: 98 % (05/24/22 0905)   Vital Signs (24h Range):  Temp:  [98.2 °F (36.8 °C)] 98.2 °F (36.8 °C)  Pulse:  [90-93] 93  Resp:  [19-27] 27  SpO2:  [98 %] 98 %  BP: (120)/(70) 120/70     Weight: 57.4 kg (126 lb 8.7 oz)  Body mass index  is 23.91 kg/m².    SpO2: 98 %  O2 Device (Oxygen Therapy): nasal cannula    No intake or output data in the 24 hours ending 05/24/22 0908    Lines/Drains/Airways       None                   Physical Exam  Vitals and nursing note reviewed.   Constitutional:       General: She is not in acute distress.     Appearance: She is well-developed. She is ill-appearing and diaphoretic.      Comments: Distressed   HENT:      Head: Normocephalic and atraumatic.   Eyes:      General:         Right eye: No discharge.         Left eye: No discharge.      Pupils: Pupils are equal, round, and reactive to light.   Neck:      Thyroid: No thyromegaly.      Vascular: No JVD.      Trachea: No tracheal deviation.   Cardiovascular:      Rate and Rhythm: Normal rate and regular rhythm.      Chest Wall: PMI is not displaced.      Pulses: Intact distal pulses.      Heart sounds: Normal heart sounds, S1 normal and S2 normal. No murmur heard.  Pulmonary:      Effort: Pulmonary effort is normal. No respiratory distress.      Breath sounds: Normal breath sounds. No wheezing or rales.   Abdominal:      General: There is no distension.      Tenderness: There is no rebound.   Musculoskeletal:      Cervical back: Neck supple.      Right lower leg: No edema.      Left lower leg: No edema.   Skin:     General: Skin is warm.      Findings: No erythema.      Comments: Bruising noted to left wrist   Neurological:      General: No focal deficit present.      Mental Status: She is alert and oriented to person, place, and time.   Psychiatric:         Mood and Affect: Mood normal.         Behavior: Behavior normal.         Thought Content: Thought content normal.       Significant Labs: CMP No results for input(s): NA, K, CL, CO2, GLU, BUN, CREATININE, CALCIUM, PROT, ALBUMIN, BILITOT, ALKPHOS, AST, ALT, ANIONGAP, ESTGFRAFRICA, EGFRNONAA in the last 48 hours., CBC No results for input(s): WBC, HGB, HCT, PLT in the last 48 hours., Troponin No results for  input(s): TROPONINI in the last 48 hours., and All pertinent lab results from the last 24 hours have been reviewed.    Significant Imaging: Echocardiogram: Transthoracic echo (TTE) complete (Cupid Only):   Results for orders placed or performed during the hospital encounter of 05/19/22   Echo   Result Value Ref Range    BSA 1.61 m2    TDI SEPTAL 0.03 m/s    LV LATERAL E/E' RATIO 12.50 m/s    LV SEPTAL E/E' RATIO 16.67 m/s    IVC diameter 2.27 cm    Left Ventricular Outflow Tract Mean Velocity 0.85640338262 cm/s    Left Ventricular Outflow Tract Mean Gradient 1.54 mmHg    TDI LATERAL 0.04 m/s    LVIDd 3.61 3.5 - 6.0 cm    IVS 1.30 (A) 0.6 - 1.1 cm    Posterior Wall 1.30 (A) 0.6 - 1.1 cm    Ao root annulus 2.68 cm    LVIDs 2.60 2.1 - 4.0 cm    FS 28 28 - 44 %    Sinus 2.55 cm    STJ 2.63 cm    Ascending aorta 2.39 cm    LV mass 160.71 g    LA size 3.92 cm    TAPSE 1.43 cm    Left Ventricle Relative Wall Thickness 0.72 cm    AV regurgitation pressure 1/2 time 492.495282617418347 ms    AV mean gradient 5 mmHg    AV valve area 1.57 cm2    AV Velocity Ratio 0.62     AV index (prosthetic) 0.64     MV mean gradient 1 mmHg    MV valve area by continuity eq 2.33 cm2    E/A ratio 0.63     Mean e' 0.04 m/s    E wave deceleration time 291.804809714126267 msec    IVRT 125.155996076865145 msec    LVOT diameter 1.77 cm    LVOT area 2.5 cm2    LVOT peak mehrdad 0.93 m/s    LVOT peak VTI 24.40 cm    Ao peak mehrdad 1.50 m/s    Ao VTI 38.3 cm    RVOT peak mehrdad 0.74 m/s    RVOT peak VTI 19.0 cm    Mr max mehrdad 4.26 m/s    LVOT stroke volume 60.01 cm3    AV peak gradient 9 mmHg    MV peak gradient 2 mmHg    PV mean gradient 1.30 mmHg    E/E' ratio 14.29 m/s    MV Peak E Mehrdad 0.50 m/s    AR Max Mehrdad 3.66 m/s    TR Max Mehrdad 3.18 m/s    MV VTI 25.8 cm    MV Peak A Mehrdad 0.79 m/s    LV Systolic Volume 24.52 mL    LV Systolic Volume Index 15.4 mL/m2    LV Diastolic Volume 54.68 mL    LV Diastolic Volume Index 34.39 mL/m2    LV Mass Index 101 g/m2    RA  Major Axis 4.03 cm    Left Atrium Minor Axis 5.00 cm    Left Atrium Major Axis 4.99 cm    Triscuspid Valve Regurgitation Peak Gradient 40 mmHg    RA Width 2.72 cm    Right Atrial Pressure (from IVC) 15 mmHg    EF 30 %    TV rest pulmonary artery pressure 55 mmHg    Narrative    · The left ventricle is normal in size with concentric hypertrophy and   moderately decreased systolic function.  · Grade I left ventricular diastolic dysfunction.  · The estimated PA systolic pressure is 55 mmHg.  · Normal right ventricular size with normal right ventricular systolic   function.  · There is pulmonary hypertension.  · Elevated central venous pressure (15 mmHg).  · The estimated ejection fraction is 30%.  · There are segmental left ventricular wall motion abnormalities.  · Mild aortic regurgitation.  · Mild mitral regurgitation.  · Mild tricuspid regurgitation.      , EKG: Reviewed, and X-Ray: CXR: X-Ray Chest 1 View (CXR): No results found for this visit on 05/24/22. and X-Ray Chest PA and Lateral (CXR): No results found for this visit on 05/24/22.

## 2022-05-25 VITALS
DIASTOLIC BLOOD PRESSURE: 58 MMHG | HEART RATE: 73 BPM | OXYGEN SATURATION: 97 % | SYSTOLIC BLOOD PRESSURE: 126 MMHG | TEMPERATURE: 98 F | WEIGHT: 127.63 LBS | BODY MASS INDEX: 24.1 KG/M2 | HEIGHT: 61 IN | RESPIRATION RATE: 28 BRPM

## 2022-05-25 PROBLEM — I21.3 STEMI (ST ELEVATION MYOCARDIAL INFARCTION): Status: RESOLVED | Noted: 2022-05-20 | Resolved: 2022-05-25

## 2022-05-25 PROBLEM — Q24.5 CORONARY-MYOCARDIAL BRIDGE: Status: ACTIVE | Noted: 2022-05-25

## 2022-05-25 LAB
ANION GAP SERPL CALC-SCNC: 12 MMOL/L (ref 8–16)
APTT BLDCRRT: 37.1 SEC (ref 21–32)
APTT BLDCRRT: 56.2 SEC (ref 21–32)
BASOPHILS # BLD AUTO: 0.08 K/UL (ref 0–0.2)
BASOPHILS NFR BLD: 1 % (ref 0–1.9)
BUN SERPL-MCNC: 17 MG/DL (ref 8–23)
CALCIUM SERPL-MCNC: 8.6 MG/DL (ref 8.7–10.5)
CHLORIDE SERPL-SCNC: 110 MMOL/L (ref 95–110)
CO2 SERPL-SCNC: 22 MMOL/L (ref 23–29)
CREAT SERPL-MCNC: 0.7 MG/DL (ref 0.5–1.4)
DIFFERENTIAL METHOD: ABNORMAL
EOSINOPHIL # BLD AUTO: 0.2 K/UL (ref 0–0.5)
EOSINOPHIL NFR BLD: 3 % (ref 0–8)
ERYTHROCYTE [DISTWIDTH] IN BLOOD BY AUTOMATED COUNT: 13.8 % (ref 11.5–14.5)
EST. GFR  (AFRICAN AMERICAN): >60 ML/MIN/1.73 M^2
EST. GFR  (NON AFRICAN AMERICAN): >60 ML/MIN/1.73 M^2
GLUCOSE SERPL-MCNC: 123 MG/DL (ref 70–110)
HCT VFR BLD AUTO: 37.3 % (ref 37–48.5)
HGB BLD-MCNC: 11.2 G/DL (ref 12–16)
IMM GRANULOCYTES # BLD AUTO: 0.03 K/UL (ref 0–0.04)
IMM GRANULOCYTES NFR BLD AUTO: 0.4 % (ref 0–0.5)
LYMPHOCYTES # BLD AUTO: 2.3 K/UL (ref 1–4.8)
LYMPHOCYTES NFR BLD: 29.7 % (ref 18–48)
MCH RBC QN AUTO: 29.2 PG (ref 27–31)
MCHC RBC AUTO-ENTMCNC: 30 G/DL (ref 32–36)
MCV RBC AUTO: 97 FL (ref 82–98)
MONOCYTES # BLD AUTO: 1.1 K/UL (ref 0.3–1)
MONOCYTES NFR BLD: 13.5 % (ref 4–15)
NEUTROPHILS # BLD AUTO: 4.1 K/UL (ref 1.8–7.7)
NEUTROPHILS NFR BLD: 52.4 % (ref 38–73)
NRBC BLD-RTO: 0 /100 WBC
PLATELET # BLD AUTO: 254 K/UL (ref 150–450)
PMV BLD AUTO: 9.9 FL (ref 9.2–12.9)
POCT GLUCOSE: 132 MG/DL (ref 70–110)
POCT GLUCOSE: 138 MG/DL (ref 70–110)
POTASSIUM SERPL-SCNC: 3.7 MMOL/L (ref 3.5–5.1)
RBC # BLD AUTO: 3.84 M/UL (ref 4–5.4)
SODIUM SERPL-SCNC: 144 MMOL/L (ref 136–145)
WBC # BLD AUTO: 7.75 K/UL (ref 3.9–12.7)

## 2022-05-25 PROCEDURE — 25000003 PHARM REV CODE 250: Performed by: INTERNAL MEDICINE

## 2022-05-25 PROCEDURE — 25000003 PHARM REV CODE 250: Performed by: NURSE PRACTITIONER

## 2022-05-25 PROCEDURE — 99291 CRITICAL CARE FIRST HOUR: CPT | Mod: ,,, | Performed by: INTERNAL MEDICINE

## 2022-05-25 PROCEDURE — 99291 PR CRITICAL CARE, E/M 30-74 MINUTES: ICD-10-PCS | Mod: ,,, | Performed by: INTERNAL MEDICINE

## 2022-05-25 PROCEDURE — 99233 SBSQ HOSP IP/OBS HIGH 50: CPT | Mod: ,,, | Performed by: NURSE PRACTITIONER

## 2022-05-25 PROCEDURE — 99233 PR SUBSEQUENT HOSPITAL CARE,LEVL III: ICD-10-PCS | Mod: ,,, | Performed by: NURSE PRACTITIONER

## 2022-05-25 PROCEDURE — 85730 THROMBOPLASTIN TIME PARTIAL: CPT | Performed by: INTERNAL MEDICINE

## 2022-05-25 PROCEDURE — 85025 COMPLETE CBC W/AUTO DIFF WBC: CPT | Performed by: INTERNAL MEDICINE

## 2022-05-25 PROCEDURE — 80048 BASIC METABOLIC PNL TOTAL CA: CPT | Performed by: INTERNAL MEDICINE

## 2022-05-25 PROCEDURE — 36415 COLL VENOUS BLD VENIPUNCTURE: CPT | Performed by: INTERNAL MEDICINE

## 2022-05-25 RX ORDER — METOPROLOL SUCCINATE 50 MG/1
50 TABLET, EXTENDED RELEASE ORAL DAILY
Qty: 30 TABLET | Refills: 11 | Status: ON HOLD | OUTPATIENT
Start: 2022-05-26 | End: 2022-12-19 | Stop reason: HOSPADM

## 2022-05-25 RX ORDER — POLYETHYLENE GLYCOL 3350 17 G/17G
17 POWDER, FOR SOLUTION ORAL DAILY
Status: DISCONTINUED | OUTPATIENT
Start: 2022-05-25 | End: 2022-05-25 | Stop reason: HOSPADM

## 2022-05-25 RX ORDER — POTASSIUM CHLORIDE 750 MG/1
30 TABLET, EXTENDED RELEASE ORAL ONCE
Status: COMPLETED | OUTPATIENT
Start: 2022-05-25 | End: 2022-05-25

## 2022-05-25 RX ADMIN — MUPIROCIN: 20 OINTMENT TOPICAL at 08:05

## 2022-05-25 RX ADMIN — FAMOTIDINE 20 MG: 20 TABLET ORAL at 08:05

## 2022-05-25 RX ADMIN — CLOPIDOGREL 75 MG: 75 TABLET, FILM COATED ORAL at 08:05

## 2022-05-25 RX ADMIN — METOPROLOL SUCCINATE 50 MG: 50 TABLET, EXTENDED RELEASE ORAL at 08:05

## 2022-05-25 RX ADMIN — POTASSIUM CHLORIDE 30 MEQ: 750 TABLET, EXTENDED RELEASE ORAL at 10:05

## 2022-05-25 RX ADMIN — POLYETHYLENE GLYCOL 3350 17 G: 17 POWDER, FOR SOLUTION ORAL at 10:05

## 2022-05-25 RX ADMIN — ASPIRIN 81 MG: 81 TABLET, COATED ORAL at 08:05

## 2022-05-25 NOTE — ASSESSMENT & PLAN NOTE
-Presents with substernal heavy, tight chest pain onset this AM  -Associated with profuse diaphoresis  -Recent admission with NSTEMI s/p LHC on 5/20/22 which showed normal coronaries  -Received ASA, heparin bolus, and nitro in ED  -Taken emergently to cath lab for primary PCI. All risks, benefits, and treatment alternatives explained to patient and family. Agreeable with proceeding.  -Further rec's to follow.    5/25/22  -s/p LHC which showed normal coronaries, severe mid LAD myocardial bridging  -Continue ASA, Plavix  -BB dosage increased  -Stable CV wise, chest pain free

## 2022-05-25 NOTE — HOSPITAL COURSE
5/25/22-Patient seen and examined today, s/p C yesterday which showed normal coronaries, +severe mid LAD myocardial bridging. BB dosage increased.  Feels ok. Chest pain free. No other CV complaints. Labs stable. Right groin with moderate bruising, U/S pending.

## 2022-05-25 NOTE — DISCHARGE SUMMARY
[facility] ([facility])  Cardiology  Discharge Summary      Patient Name: Leslie Wood  MRN: [id number]  Admission Date: 5/24/2022  Hospital Length of Stay: 1 days  Discharge Date and Time:  05/25/2022 3:26 PM  Attending Physician: Domingo Martins MD    Discharging Provider: Meka Franklin PA-C  Primary Care Physician: Angeles Carson MD    HPI:   Ms. 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 Associated symptoms included diaphoresis and SOB. Patient denied any associated fever, chills, palpitations, near syncope, or syncope. EKG performed in ED consistent with STEMI and patient taken urgently to cath lab for primary PCI.    She received sublingual nitro and 4,000 units of heparin in ED. Consent obtained from POA as patient was distressed/unable to sign.           Procedure(s) (LRB):  ANGIOGRAM, CORONARY ARTERY (N/A)     Indwelling Lines/Drains at time of discharge:  Lines/Drains/Airways     Drain  Duration           Female External Urinary Catheter 05/24/22 1120 1 day                Hospital Course:  5/25/22-Patient seen and examined today, s/p C yesterday which showed normal coronaries, +severe mid LAD myocardial bridging. BB dosage increased.  Feels ok. Chest pain free. No other CV complaints. Labs stable. Right groin with moderate bruising, U/S pending.       Patient seen and examined today by myself and Dr. Mendoza and deemed suitable for discharge. No acute events overnight. Chest pain free. BP variable but on low side, unable to tolerate higher does of meds/ARB, can further optimize in clinic. Right groin U/S reviewed, no  pseudoaneurysm. Patient educated about post-cath restrictions and will need to f/u in clinic in 1 week.     Goals of Care Treatment Preferences:  Code Status: Full Code      Consults:   Consults (From admission, onward)        Status Ordering Provider     Inpatient consult to Critical Care Medicine  Once        Provider:  SYLVIA Zapata-BC    Completed FILI BATISTA          Significant Diagnostic Studies: LHC and labs    Pending Diagnostic Studies:     Procedure Component Value Units Date/Time    APTT [[id number]]     Order Status: Sent Lab Status: No result     Specimen: Blood     BNP [[id number]] Collected: 05/24/22 0912    Order Status: Sent Lab Status: No result     Specimen: Blood           Final Active Diagnoses:    Diagnosis Date Noted POA    PRINCIPAL PROBLEM:  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] 05/25/2022 Not Applicable    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  Service: Cardiology      Discharged Condition: good    Disposition: Home or Self Care    Follow Up:   Follow-up Information     Fili Batista MD Follow up in 1 week(s).    Specialties: Interventional Cardiology, Cardiology  Contact information:  [address]  [place] [address] [phone]                       Patient Instructions:      Diet Cardiac     Other restrictions (specify):   Order Comments: No heavy bending or lifting  No strenuous activity  No baths x 5 days; showers  only  No driving     Notify your health care provider if you experience any of the following:  temperature >100.4     Notify your health care provider if you experience any of the following:  persistent nausea and vomiting or diarrhea     Notify your health care provider if you experience any of the following:  severe uncontrolled pain     Notify your health care provider if you experience any of the following:  redness, tenderness, or signs of infection (pain, swelling, redness, odor or green/yellow discharge around incision site)     Notify your health care provider if you experience any of the following:  difficulty breathing or increased cough     Notify your health care provider if you experience any of the following:  severe persistent headache     Notify your health care provider if you experience any of the following:  worsening rash     Notify your health care provider if you experience any of the following:  persistent dizziness, light-headedness, or visual disturbances     Notify your health care provider if you experience any of the following:  increased confusion or weakness     Notify your health care provider if you experience any of the following:     Remove dressing in 24 hours     Activity as tolerated     Medications:  Reconciled Home Medications:      Medication List      START taking these medications    metoprolol succinate 50 MG 24 hr tablet  Commonly known as: TOPROL-XL  Take 1 tablet (50 mg total) by mouth once daily.  Start taking on: May 26, 2022        CONTINUE taking these medications    aspirin 81 MG EC tablet  Commonly known as: ECOTRIN  Take 81 mg by mouth once daily.     atorvastatin 80 MG tablet  Commonly known as: LIPITOR  Take 1 tablet (80 mg total) by mouth every evening.     clopidogreL 75 mg tablet  Commonly known as: PLAVIX  Take 1 tablet (75 mg total) by mouth once daily.     traZODone 50 MG tablet  Commonly known as: DESYREL  TAKE 1 TABLET BY MOUTH ONCE AT BEDTIME.         STOP taking these medications    carvediloL 6.25 MG tablet  Commonly known as: COREG     cloNIDine 0.1 MG tablet  Commonly known as: CATAPRES     diclofenac sodium 1 % Gel  Commonly known as: VOLTAREN     furosemide 20 MG tablet  Commonly known as: LASIX     isosorbide mononitrate 30 MG 24 hr tablet  Commonly known as: IMDUR     metoprolol tartrate 25 MG tablet  Commonly known as: LOPRESSOR     nitroGLYCERIN 0.4 MG SL tablet  Commonly known as: NITROSTAT     valsartan 160 MG tablet  Commonly known as: DIOVAN            Time spent on the discharge of patient: 25 minutes    Meka Franklin PA-C  Cardiology  [facility] ([facility])

## 2022-05-25 NOTE — PROGRESS NOTES
"[facility] ([facility])  Critical Care Medicine  Progress Note    Patient Name: Leslie Wood  MRN: [id number]  Admission Date: 2022  Hospital Length of Stay: 1 days  Code Status: Prior  Attending Physician: Domingo Martins MD   Primary Care Provider: Angeles Carson MD   Principal Problem: <principal problem not specified>    Subjective:     HPI:  81 year old female with PMHx of DM II, GERD, HLD, CVA, and HTN who of note was admitted here - due to chest pain/NSTEMI following  for her brother. She underwent LHC on  revealing normal coronary arteries and EF 40% concerning for takotsubo cardiomyopathy    She returned to [facility] ED this am. She was preparing for follow up cardiology clinic appointment when she told family she didn't "feel well" and noted that she had not slept due to "feeling hot" overnight  ED EKG showed acute MI and she was taken directly to [facility]  LHC revealed Severe mid LAD bridging; chest pain and EKG improved with BB therapy      Hospital/ICU Course:  Admitted to ICU from cath lab with sheath in place to left femoral artery   - no acute events overnight. RA with VSS        Review of Systems   Constitutional:  Positive for fatigue. Negative for diaphoresis.   Respiratory:  Negative for chest tightness and shortness of breath.    Cardiovascular:  Negative for chest pain, palpitations and leg swelling.   Gastrointestinal:  Negative for abdominal pain.   Genitourinary: Negative.    Musculoskeletal: Negative.    Skin:         bruising   Neurological:  Negative for speech difficulty and headaches.   Hematological:  Bruises/bleeds easily.   Psychiatric/Behavioral:  The patient is not nervous/anxious.    Objective:     Vital Signs (Most Recent):  Temp: 98.2 °F (36.8 °C) (22)  Pulse: 73 (22)  Resp: (!) 26 (22)  BP: 125/76 (22)  SpO2: 98 % (22)   Vital Signs (24h Range):  Temp:  [97.1 °F (36.2 °C)-98.2 °F (36.8 " °C)] 98.2 °F (36.8 °C)  Pulse:  [61-93] 73  Resp:  [19-42] 26  SpO2:  [90 %-100 %] 98 %  BP: ()/() 125/76     Weight: 57.9 kg (127 lb 10.3 oz)  Body mass index is 24.12 kg/m².      Intake/Output Summary (Last 24 hours) at 5/25/2022 0755  Last data filed at 5/25/2022 0700  Gross per 24 hour   Intake 915.43 ml   Output 725 ml   Net 190.43 ml        heparin (porcine) in D5W 12 Units/kg/hr (05/25/22 0700)       Physical Exam  Vitals and nursing note reviewed.   Constitutional:       General: She is awake. She is not in acute distress.     Appearance: She is not toxic-appearing.   HENT:      Head: Atraumatic.   Eyes:      Conjunctiva/sclera: Conjunctivae normal.   Neck:      Vascular: No JVD.   Cardiovascular:      Rate and Rhythm: Normal rate and regular rhythm.      Pulses:           Radial pulses are 2+ on the right side and 2+ on the left side.        Dorsalis pedis pulses are 1+ on the right side and 1+ on the left side.   Pulmonary:      Effort: Pulmonary effort is normal.      Breath sounds: Normal breath sounds.   Abdominal:      General: Bowel sounds are normal. There is no distension.      Palpations: Abdomen is soft.   Musculoskeletal:      Right lower leg: No edema.      Left lower leg: No edema.   Skin:     General: Skin is warm and dry.      Capillary Refill: Capillary refill takes 2 to 3 seconds.          Neurological:      Mental Status: She is alert and oriented to person, place, and time.      GCS: GCS eye subscore is 4. GCS verbal subscore is 5. GCS motor subscore is 6.   Psychiatric:         Mood and Affect: Mood normal.         Speech: Speech normal.         Behavior: Behavior is cooperative.         Thought Content: Thought content normal.         Cognition and Memory: She exhibits impaired recent memory.         Judgment: Judgment is not impulsive or inappropriate.       Vents:  Oxygen Concentration (%): 28 (05/24/22 0905)    Lines/Drains/Airways       Drain  Duration              Female External Urinary Catheter 05/24/22 1120 <1 day              Peripheral Intravenous Line  Duration                  Peripheral IV - Single Lumen 05/24/22 0902 18 G Right Antecubital <1 day                    Significant Labs:    CBC/Anemia Profile:  Recent Labs   Lab 05/24/22  0918 05/24/22  1025 05/25/22  0649   WBC 9.92 9.06 7.75   HGB 13.1 12.0 11.2*   HCT 40.1 37.7 37.3    258 254   MCV 90 93 97   RDW 13.6 13.5 13.8          Chemistries:  Recent Labs   Lab 05/24/22  0918 05/25/22  0649   * 144   K 3.4* 3.7    110   CO2 23 22*   BUN 19 17   CREATININE 1.0 0.7   CALCIUM 9.4 8.6*   ALBUMIN 3.6  --    PROT 7.2  --    BILITOT 0.9  --    ALKPHOS 73  --    ALT 47*  --    AST 49*  --          All pertinent labs within the past 24 hours have been reviewed.    Significant Imaging:   I have reviewed all pertinent imaging results/findings within the past 24 hours.      ABG  No results for input(s): PH, PO2, PCO2, HCO3, BE in the last 168 hours.  Assessment/Plan:     Cardiac/Vascular  Stress-induced cardiomyopathy  continue coreg, ARB as tolerated    STEMI (ST elevation myocardial infarction)  LAD bridging Not amenable to PCI  Continue medical management with ASA, plavix, heparin, BB  Note recommends eval for CABG if unable to stabilize with medical therapy    Endocrine  Type 2 diabetes mellitus without complication, without long-term current use of insulin  SSI prn with monitoring for glucose control and prevention of insulin toxicity      Critical Care Daily Checklist:    A: Awake: RASS Goal/Actual Goal:    Actual: [place] Agitation Sedation Scale (RASS): Alert and calm   B: Spontaneous Breathing Trial Performed?     C: SAT & SBT Coordinated?  n/a                      D: Delirium: CAM-ICU Overall CAM-ICU: Negative   E: Early Mobility Performed? Yes   F: Feeding Goal:    Status:     Current Diet Order   Procedures    Diet Cardiac [facility]; Consistent Carbohydrate; Isolation Tray - Regular  [place]     Order Specific Question:   Indicate patient location for additional diet options:     Answer:   [facility]     Order Specific Question:   Additional Diet Options:     Answer:   Consistent Carbohydrate     Order Specific Question:   Tray type:     Answer:   Isolation Tray - Regular [place]      AS: Analgesia/Sedation prn   T: Thromboembolic Prophylaxis heparin   H: HOB > 300 Yes   U: Stress Ulcer Prophylaxis (if needed) pepcid   G: Glucose Control SSI   B: Bowel Function Stool Occurrence: 0   I: Indwelling Catheter (Lines & [employer]) Necessity reviewed   D: De-escalation of Antimicrobials/Pharmacotherapies reviewed    Plan for the day/ETD As above    Family/Goals of Care: Home on discharge   I have discussed case and plan of care in detail with Dr Martins; Status and plan of care were discussed with team on multidisciplinary rounds.    OK for transfer out of ICU; pulmonary / critical care team will sign off with transfer; please call if we can be of any additional assistance.     SYLVIA Zapata-BC  Critical Care Medicine  [facility] ([facility]

## 2022-05-25 NOTE — NURSING
Went over discharge instructions with patient and patient's grandson.   Stressed importance of making and keeping all follow ups as well as making prescribed medication changes.   Prescription sent to pt's requested pharmacy by cardiology team.  Patient and grandson at bedside verbalized understanding and have had all questions in regards to discharge answered to satisfaction.  IV removed without complications.  Patient assisted in dressing.  Discharged via wheelchair in no acute distress to personal transportation provided by grandson.

## 2022-05-25 NOTE — PLAN OF CARE
[facility])  Discharge Final Note    Primary Care Provider: Angeles Carson MD    Expected Discharge Date: 5/25/2022    Final Discharge Note (most recent)     Final Note - 05/25/22 1603        Final Note    Assessment Type Final Discharge Note     Anticipated Discharge Disposition Home-Health Care [facility]     Hospital Resources/Appts/Education Provided Appointments scheduled by Navigator/Coordinator;Post-Acute resouces added to AVS        Post-Acute Status    Post-Acute Authorization Home Health     Home Health Status Set-up Complete/Auth obtained                 Important Message from Medicare             Contact Info     Domingo Martins MD   Specialty: Interventional Cardiology, Cardiology    [address] [place] [address]   Phone: [phone]       Next Steps: Follow up in 1 week(s)        Anticipated DC dispo: home with [facility]  Cards F/U: 6/6 earliest available, patient is on waitlist for sooner appt and message left with clinic staff.

## 2022-05-25 NOTE — ASSESSMENT & PLAN NOTE
-EF 40% by last admission, thought to be stress-induced CMPY  -Continue BB  -Will resume ARB as BP permits

## 2022-05-25 NOTE — SUBJECTIVE & OBJECTIVE
Review of Systems   Constitutional:  Positive for fatigue. Negative for diaphoresis.   Respiratory:  Negative for chest tightness and shortness of breath.    Cardiovascular:  Negative for chest pain, palpitations and leg swelling.   Gastrointestinal:  Negative for abdominal pain.   Genitourinary: Negative.    Musculoskeletal: Negative.    Skin:         bruising   Neurological:  Negative for speech difficulty and headaches.   Hematological:  Bruises/bleeds easily.   Psychiatric/Behavioral:  The patient is not nervous/anxious.    Objective:     Vital Signs (Most Recent):  Temp: 98.2 °F (36.8 °C) (05/25/22 0730)  Pulse: 73 (05/25/22 0730)  Resp: (!) 26 (05/25/22 0730)  BP: 125/76 (05/25/22 0730)  SpO2: 98 % (05/25/22 0730)   Vital Signs (24h Range):  Temp:  [97.1 °F (36.2 °C)-98.2 °F (36.8 °C)] 98.2 °F (36.8 °C)  Pulse:  [61-93] 73  Resp:  [19-42] 26  SpO2:  [90 %-100 %] 98 %  BP: ()/() 125/76     Weight: 57.9 kg (127 lb 10.3 oz)  Body mass index is 24.12 kg/m².      Intake/Output Summary (Last 24 hours) at 5/25/2022 0755  Last data filed at 5/25/2022 0700  Gross per 24 hour   Intake 915.43 ml   Output 725 ml   Net 190.43 ml        heparin (porcine) in D5W 12 Units/kg/hr (05/25/22 0700)       Physical Exam  Vitals and nursing note reviewed.   Constitutional:       General: She is awake. She is not in acute distress.     Appearance: She is not toxic-appearing.   HENT:      Head: Atraumatic.   Eyes:      Conjunctiva/sclera: Conjunctivae normal.   Neck:      Vascular: No JVD.   Cardiovascular:      Rate and Rhythm: Normal rate and regular rhythm.      Pulses:           Radial pulses are 2+ on the right side and 2+ on the left side.        Dorsalis pedis pulses are 1+ on the right side and 1+ on the left side.   Pulmonary:      Effort: Pulmonary effort is normal.      Breath sounds: Normal breath sounds.   Abdominal:      General: Bowel sounds are normal. There is no distension.      Palpations: Abdomen is  soft.   Musculoskeletal:      Right lower leg: No edema.      Left lower leg: No edema.   Skin:     General: Skin is warm and dry.      Capillary Refill: Capillary refill takes 2 to 3 seconds.          Neurological:      Mental Status: She is alert and oriented to person, place, and time.      GCS: GCS eye subscore is 4. GCS verbal subscore is 5. GCS motor subscore is 6.   Psychiatric:         Mood and Affect: Mood normal.         Speech: Speech normal.         Behavior: Behavior is cooperative.         Thought Content: Thought content normal.         Cognition and Memory: She exhibits impaired recent memory.         Judgment: Judgment is not impulsive or inappropriate.       Vents:  Oxygen Concentration (%): 28 (05/24/22 0905)    Lines/Drains/Airways       Drain  Duration             Female External Urinary Catheter 05/24/22 1120 <1 day              Peripheral Intravenous Line  Duration                  Peripheral IV - Single Lumen 05/24/22 0902 18 G Right Antecubital <1 day                    Significant Labs:    CBC/Anemia Profile:  Recent Labs   Lab 05/24/22  0918 05/24/22  1025 05/25/22  0649   WBC 9.92 9.06 7.75   HGB 13.1 12.0 11.2*   HCT 40.1 37.7 37.3    258 254   MCV 90 93 97   RDW 13.6 13.5 13.8          Chemistries:  Recent Labs   Lab 05/24/22  0918 05/25/22  0649   * 144   K 3.4* 3.7    110   CO2 23 22*   BUN 19 17   CREATININE 1.0 0.7   CALCIUM 9.4 8.6*   ALBUMIN 3.6  --    PROT 7.2  --    BILITOT 0.9  --    ALKPHOS 73  --    ALT 47*  --    AST 49*  --          All pertinent labs within the past 24 hours have been reviewed.    Significant Imaging:   I have reviewed all pertinent imaging results/findings within the past 24 hours.

## 2022-05-25 NOTE — PLAN OF CARE
AAO, independent patient on heparin gtt s/p heart cath. Groin site soft. CDI. No hematoma present. SR on monitor, normotensive, afebrile, RA. Purewick in place. Good UOP. Family at bedside overnight.

## 2022-05-25 NOTE — PROGRESS NOTES
[facility] - Intensive Care ([facility])  Cardiology  Progress Note    Patient Name: Leslie Wood  MRN: [id number]  Admission Date: 5/24/2022  Hospital Length of Stay: 1 days  Code Status: Prior   Attending Physician: Domingo Martins MD   Primary Care Physician: Angeles Carson MD  Expected Discharge Date:   Principal Problem:<principal problem not specified>    Subjective:   HPI:  Ms. Wood is an 81 year old female patient whose current medical conditions include chronic back pain, DM type II, GERD, hyperlipidemia, HTN, history of prior CVA, and recent NSTEMI (s/p LHC on 5/20/22 which showed normal coronaries, EF 40%, thought to be possibly stress-induced cardiomyopathy) who presented to [facility] ED this AM with a chief complaint of substernal heavy, tight chest pain that onset overnight. Associated symptoms included diaphoresis and SOB. Patient denied any associated fever, chills, palpitations, near syncope, or syncope. EKG performed in ED consistent with STEMI and patient taken urgently to cath lab for primary PCI.    She received sublingual nitro and 4,000 units of heparin in ED. Consent obtained from POA as patient was distressed/unable to sign.     Hospital Course:   5/25/22-Patient seen and examined today, s/p LHC yesterday which showed normal coronaries, +severe mid LAD myocardial bridging. BB dosage increased.  Feels ok. Chest pain free. No other CV complaints. Labs stable. Right groin with moderate bruising, U/S pending.           Review of Systems   Constitutional: Positive for malaise/fatigue.   HENT: Negative.     Eyes: Negative.    Cardiovascular: Negative.    Respiratory: Negative.     Endocrine: Negative.    Hematologic/Lymphatic: Negative.    Skin: Negative.    Musculoskeletal: Negative.    Gastrointestinal: Negative.    Genitourinary: Negative.    Neurological: Negative.    Psychiatric/Behavioral: Negative.     Allergic/Immunologic: Negative.    Objective:     Vital Signs (Most Recent):  Temp: 98.2 °F  (36.8 °C) (05/25/22 0730)  Pulse: 83 (05/25/22 1100)  Resp: (!) 25 (05/25/22 1100)  BP: 121/70 (05/25/22 1100)  SpO2: 96 % (05/25/22 1100)   Vital Signs (24h Range):  Temp:  [97.1 °F (36.2 °C)-98.2 °F (36.8 °C)] 98.2 °F (36.8 °C)  Pulse:  [61-83] 83  Resp:  [20-42] 25  SpO2:  [90 %-100 %] 96 %  BP: ()/() 121/70     Weight: 57.9 kg (127 lb 10.3 oz)  Body mass index is 24.12 kg/m².     SpO2: 96 %  O2 Device (Oxygen Therapy): room air      Intake/Output Summary (Last 24 hours) at 5/25/2022 1142  Last data filed at 5/25/2022 1100  Gross per 24 hour   Intake 1014.53 ml   Output 825 ml   Net 189.53 ml       Lines/Drains/Airways       Drain  Duration             Female External Urinary Catheter 05/24/22 1120 1 day              Peripheral Intravenous Line  Duration                  Peripheral IV - Single Lumen 05/24/22 0902 18 G Right Antecubital 1 day                    Physical Exam  Vitals and nursing note reviewed.   Constitutional:       General: She is not in acute distress.     Appearance: Normal appearance. She is well-developed. She is not diaphoretic.   HENT:      Head: Normocephalic and atraumatic.   Eyes:      General:         Right eye: No discharge.         Left eye: No discharge.      Pupils: Pupils are equal, round, and reactive to light.   Neck:      Thyroid: No thyromegaly.      Vascular: No JVD.      Trachea: No tracheal deviation.   Cardiovascular:      Rate and Rhythm: Normal rate and regular rhythm.      Chest Wall: PMI is not displaced.      Pulses: Intact distal pulses.      Heart sounds: Normal heart sounds. No murmur heard.    No friction rub. No gallop. No S3 or S4 sounds.   Pulmonary:      Effort: Pulmonary effort is normal. No respiratory distress.      Breath sounds: Normal breath sounds. No wheezing or rales.   Abdominal:      General: There is no distension.      Tenderness: There is no rebound.   Musculoskeletal:      Cervical back: Neck supple.   Skin:     General: Skin is warm  and dry.      Findings: No erythema.      Comments: Groin access site soft (right side with moderate bruising)        Ecchymosis noted to left arm/wrist area   Neurological:      General: No focal deficit present.      Mental Status: She is alert and oriented to person, place, and time.   Psychiatric:         Mood and Affect: Mood normal.         Behavior: Behavior normal.         Thought Content: Thought content normal.       Significant Labs: CMP   Recent Labs   Lab 05/24/22  0918 05/25/22  0649   * 144   K 3.4* 3.7    110   CO2 23 22*   * 123*   BUN 19 17   CREATININE 1.0 0.7   CALCIUM 9.4 8.6*   PROT 7.2  --    ALBUMIN 3.6  --    BILITOT 0.9  --    ALKPHOS 73  --    AST 49*  --    ALT 47*  --    ANIONGAP 17* 12   ESTGFRAFRICA >60 >60   EGFRNONAA 52* >60   , CBC   Recent Labs   Lab 05/24/22  0918 05/24/22  1025 05/25/22  0649   WBC 9.92 9.06 7.75   HGB 13.1 12.0 11.2*   HCT 40.1 37.7 37.3    258 254   , Lipid Panel No results for input(s): CHOL, HDL, LDLCALC, TRIG, CHOLHDL in the last 48 hours., Troponin   Recent Labs   Lab 05/24/22  0918 05/24/22  1026   TROPONINI 1.914* 1.867*   , and All pertinent lab results from the last 24 hours have been reviewed.    Significant Imaging: Echocardiogram: Transthoracic echo (TTE) complete ([facility] Only):   Results for orders placed or performed during the hospital encounter of 05/19/22   Echo   Result Value Ref Range    BSA 1.61 m2    TDI SEPTAL 0.03 m/s    LV LATERAL E/E' RATIO 12.50 m/s    LV SEPTAL E/E' RATIO 16.67 m/s    IVC diameter 2.27 cm    Left Ventricular Outflow Tract Mean Velocity 0.23416266508 cm/s    Left Ventricular Outflow Tract Mean Gradient 1.54 mmHg    TDI LATERAL 0.04 m/s    LVIDd 3.61 3.5 - 6.0 cm    IVS 1.30 (A) 0.6 - 1.1 cm    Posterior Wall 1.30 (A) 0.6 - 1.1 cm    Ao root annulus 2.68 cm    LVIDs 2.60 2.1 - 4.0 cm    FS 28 28 - 44 %    Sinus 2.55 cm    STJ 2.63 cm    Ascending aorta 2.39 cm    LV mass 160.71 g    LA size 3.92  cm    TAPSE 1.43 cm    Left Ventricle Relative Wall Thickness 0.72 cm    AV regurgitation pressure 1/2 time 492.494630075455046 ms    AV mean gradient 5 mmHg    AV valve area 1.57 cm2    AV Velocity Ratio 0.62     AV index (prosthetic) 0.64     MV mean gradient 1 mmHg    MV valve area by continuity eq 2.33 cm2    E/A ratio 0.63     Mean e' 0.04 m/s    E wave deceleration time 291.848474270761348 msec    IVRT 125.387758614833987 msec    LVOT diameter 1.77 cm    LVOT area 2.5 cm2    LVOT peak mehrdad 0.93 m/s    LVOT peak VTI 24.40 cm    Ao peak mehrdad 1.50 m/s    Ao VTI 38.3 cm    RVOT peak mehrdad 0.74 m/s    RVOT peak VTI 19.0 cm    Mr max mehrdad 4.26 m/s    LVOT stroke volume 60.01 cm3    AV peak gradient 9 mmHg    MV peak gradient 2 mmHg    PV mean gradient 1.30 mmHg    E/E' ratio 14.29 m/s    MV Peak E Mehrdad 0.50 m/s    AR Max Mehrdad 3.66 m/s    TR Max Mehrdad 3.18 m/s    MV VTI 25.8 cm    MV Peak A Mehrdad 0.79 m/s    LV Systolic Volume 24.52 mL    LV Systolic Volume Index 15.4 mL/m2    LV Diastolic Volume 54.68 mL    LV Diastolic Volume Index 34.39 mL/m2    LV Mass Index 101 g/m2    RA Major Axis 4.03 cm    Left Atrium Minor Axis 5.00 cm    Left Atrium Major Axis 4.99 cm    Triscuspid Valve Regurgitation Peak Gradient 40 mmHg    RA Width 2.72 cm    Right Atrial Pressure (from IVC) 15 mmHg    EF 30 %    TV rest pulmonary artery pressure 55 mmHg    Narrative    · The left ventricle is normal in size with concentric hypertrophy and   moderately decreased systolic function.  · Grade I left ventricular diastolic dysfunction.  · The estimated PA systolic pressure is 55 mmHg.  · Normal right ventricular size with normal right ventricular systolic   function.  · There is pulmonary hypertension.  · Elevated central venous pressure (15 mmHg).  · The estimated ejection fraction is 30%.  · There are segmental left ventricular wall motion abnormalities.  · Mild aortic regurgitation.  · Mild mitral regurgitation.  · Mild tricuspid regurgitation.       , EKG: Reviewed, and X-Ray: CXR: X-Ray Chest 1 View (CXR): No results found for this visit on 05/24/22. and X-Ray Chest PA and Lateral (CXR): No results found for this visit on 05/24/22.    Assessment and Plan:   Patient who presents with recurrent chest pain/abnormal EKG, s/p LHC which showed normal coronaries but severe mid LAD myocardial bridging. Meds optimized. Possible d/c home later this afternoon. Check right groin U/S to rule out pseudoaneurysm.     Stress-induced cardiomyopathy  -EF 40% by last admission, thought to be stress-induced CMPY  -Continue BB  -Will resume ARB as BP permits    STEMI (ST elevation myocardial infarction)  -Presents with substernal heavy, tight chest pain onset this AM  -Associated with profuse diaphoresis  -Recent admission with NSTEMI s/p LHC on 5/20/22 which showed normal coronaries  -Received ASA, heparin bolus, and nitro in ED  -Taken emergently to cath lab for primary PCI. All risks, benefits, and treatment alternatives explained to patient and family. Agreeable with proceeding.  -Further rec's to follow.    5/25/22  -s/p LHC which showed normal coronaries, severe mid LAD myocardial bridging  -Continue ASA, Plavix  -BB dosage increased  -Stable CV wise, chest pain free    Type 2 diabetes mellitus without complication, without long-term current use of insulin  -Mgmt as per hospital medicine    Hyperlipidemia LDL goal <100  -Statin    Essential hypertension  -Continue home meds as tolerated    5/25/22  -Continue BB        VTE Risk Mitigation (From admission, onward)         Ordered     heparin 25,000 units in dextrose 5% (100 units/ml) IV bolus from bag - ADDITIONAL PRN BOLUS - 60 units/kg (max bolus 4000 units)  As needed (PRN)        Question:  Heparin Infusion Adjustment (DO NOT MODIFY ANSWER)  Answer:  [email or web address]\epic\Images\Pharmacy\HeparinInfusions\heparin LOW INTENSITY nomogram for [facility] [id number]    05/24/22 1000     heparin 25,000 units in dextrose 5% (100 units/ml)  IV bolus from bag - ADDITIONAL PRN BOLUS - 30 units/kg (max bolus 4000 units)  As needed (PRN)        Question:  Heparin Infusion Adjustment (DO NOT MODIFY ANSWER)  Answer:  \\[email or web address]\epic\Images\Pharmacy\HeparinInfusions\heparin LOW INTENSITY nomogram for [facility] [id number].pdf    05/24/22 1000     heparin 25,000 units in dextrose 5% 250 mL (100 units/mL) infusion LOW INTENSITY nomogram - [facility]  Continuous        Question Answer Comment   Heparin Infusion Adjustment (DO NOT MODIFY ANSWER) \\[email or web address]\epic\Images\Pharmacy\HeparinInfusions\heparin LOW INTENSITY nomogram for [facility] [id number].pdf    Begin at (in units/kg/hr) 12        05/24/22 1000                Meka Franklin PA-C  Cardiology  '[facility] - Int[facility] ([facility])

## 2022-05-25 NOTE — SUBJECTIVE & OBJECTIVE
Review of Systems   Constitutional: Positive for malaise/fatigue.   HENT: Negative.     Eyes: Negative.    Cardiovascular: Negative.    Respiratory: Negative.     Endocrine: Negative.    Hematologic/Lymphatic: Negative.    Skin: Negative.    Musculoskeletal: Negative.    Gastrointestinal: Negative.    Genitourinary: Negative.    Neurological: Negative.    Psychiatric/Behavioral: Negative.     Allergic/Immunologic: Negative.    Objective:     Vital Signs (Most Recent):  Temp: 98.2 °F (36.8 °C) (05/25/22 0730)  Pulse: 83 (05/25/22 1100)  Resp: (!) 25 (05/25/22 1100)  BP: 121/70 (05/25/22 1100)  SpO2: 96 % (05/25/22 1100)   Vital Signs (24h Range):  Temp:  [97.1 °F (36.2 °C)-98.2 °F (36.8 °C)] 98.2 °F (36.8 °C)  Pulse:  [61-83] 83  Resp:  [20-42] 25  SpO2:  [90 %-100 %] 96 %  BP: ()/() 121/70     Weight: 57.9 kg (127 lb 10.3 oz)  Body mass index is 24.12 kg/m².     SpO2: 96 %  O2 Device (Oxygen Therapy): room air      Intake/Output Summary (Last 24 hours) at 5/25/2022 1142  Last data filed at 5/25/2022 1100  Gross per 24 hour   Intake 1014.53 ml   Output 825 ml   Net 189.53 ml       Lines/Drains/Airways       Drain  Duration             Female External Urinary Catheter 05/24/22 1120 1 day              Peripheral Intravenous Line  Duration                  Peripheral IV - Single Lumen 05/24/22 0902 18 G Right Antecubital 1 day                    Physical Exam  Vitals and nursing note reviewed.   Constitutional:       General: She is not in acute distress.     Appearance: Normal appearance. She is well-developed. She is not diaphoretic.   HENT:      Head: Normocephalic and atraumatic.   Eyes:      General:         Right eye: No discharge.         Left eye: No discharge.      Pupils: Pupils are equal, round, and reactive to light.   Neck:      Thyroid: No thyromegaly.      Vascular: No JVD.      Trachea: No tracheal deviation.   Cardiovascular:      Rate and Rhythm: Normal rate and regular rhythm.       Chest Wall: PMI is not displaced.      Pulses: Intact distal pulses.      Heart sounds: Normal heart sounds. No murmur heard.    No friction rub. No gallop. No S3 or S4 sounds.   Pulmonary:      Effort: Pulmonary effort is normal. No respiratory distress.      Breath sounds: Normal breath sounds. No wheezing or rales.   Abdominal:      General: There is no distension.      Tenderness: There is no rebound.   Musculoskeletal:      Cervical back: Neck supple.   Skin:     General: Skin is warm and dry.      Findings: No erythema.      Comments: Groin access site soft (right side with moderate bruising)        Ecchymosis noted to left arm/wrist area   Neurological:      General: No focal deficit present.      Mental Status: She is alert and oriented to person, place, and time.   Psychiatric:         Mood and Affect: Mood normal.         Behavior: Behavior normal.         Thought Content: Thought content normal.       Significant Labs: CMP   Recent Labs   Lab 05/24/22  0918 05/25/22  0649   * 144   K 3.4* 3.7    110   CO2 23 22*   * 123*   BUN 19 17   CREATININE 1.0 0.7   CALCIUM 9.4 8.6*   PROT 7.2  --    ALBUMIN 3.6  --    BILITOT 0.9  --    ALKPHOS 73  --    AST 49*  --    ALT 47*  --    ANIONGAP 17* 12   ESTGFRAFRICA >60 >60   EGFRNONAA 52* >60   , CBC   Recent Labs   Lab 05/24/22  0918 05/24/22  1025 05/25/22  0649   WBC 9.92 9.06 7.75   HGB 13.1 12.0 11.2*   HCT 40.1 37.7 37.3    258 254   , Lipid Panel No results for input(s): CHOL, HDL, LDLCALC, TRIG, CHOLHDL in the last 48 hours., Troponin   Recent Labs   Lab 05/24/22  0918 05/24/22  1026   TROPONINI 1.914* 1.867*   , and All pertinent lab results from the last 24 hours have been reviewed.    Significant Imaging: Echocardiogram: Transthoracic echo (TTE) complete ([facility] Only):   Results for orders placed or performed during the hospital encounter of 05/19/22   Echo   Result Value Ref Range    BSA 1.61 m2    TDI SEPTAL 0.03 m/s    LV  LATERAL E/E' RATIO 12.50 m/s    LV SEPTAL E/E' RATIO 16.67 m/s    IVC diameter 2.27 cm    Left Ventricular Outflow Tract Mean Velocity 0.94866629609 cm/s    Left Ventricular Outflow Tract Mean Gradient 1.54 mmHg    TDI LATERAL 0.04 m/s    LVIDd 3.61 3.5 - 6.0 cm    IVS 1.30 (A) 0.6 - 1.1 cm    Posterior Wall 1.30 (A) 0.6 - 1.1 cm    Ao root annulus 2.68 cm    LVIDs 2.60 2.1 - 4.0 cm    FS 28 28 - 44 %    Sinus 2.55 cm    STJ 2.63 cm    Ascending aorta 2.39 cm    LV mass 160.71 g    LA size 3.92 cm    TAPSE 1.43 cm    Left Ventricle Relative Wall Thickness 0.72 cm    AV regurgitation pressure 1/2 time 492.958069388378695 ms    AV mean gradient 5 mmHg    AV valve area 1.57 cm2    AV Velocity Ratio 0.62     AV index (prosthetic) 0.64     MV mean gradient 1 mmHg    MV valve area by continuity eq 2.33 cm2    E/A ratio 0.63     Mean e' 0.04 m/s    E wave deceleration time 291.695785350165972 msec    IVRT 125.452932144303400 msec    LVOT diameter 1.77 cm    LVOT area 2.5 cm2    LVOT peak mehrdad 0.93 m/s    LVOT peak VTI 24.40 cm    Ao peak mehrdad 1.50 m/s    Ao VTI 38.3 cm    RVOT peak mehrdad 0.74 m/s    RVOT peak VTI 19.0 cm    Mr max mehrdad 4.26 m/s    LVOT stroke volume 60.01 cm3    AV peak gradient 9 mmHg    MV peak gradient 2 mmHg    PV mean gradient 1.30 mmHg    E/E' ratio 14.29 m/s    MV Peak E Mehrdad 0.50 m/s    AR Max Mehrdad 3.66 m/s    TR Max Mehrdad 3.18 m/s    MV VTI 25.8 cm    MV Peak A Mehrdad 0.79 m/s    LV Systolic Volume 24.52 mL    LV Systolic Volume Index 15.4 mL/m2    LV Diastolic Volume 54.68 mL    LV Diastolic Volume Index 34.39 mL/m2    LV Mass Index 101 g/m2    RA Major Axis 4.03 cm    Left Atrium Minor Axis 5.00 cm    Left Atrium Major Axis 4.99 cm    Triscuspid Valve Regurgitation Peak Gradient 40 mmHg    RA Width 2.72 cm    Right Atrial Pressure (from IVC) 15 mmHg    EF 30 %    TV rest pulmonary artery pressure 55 mmHg    Narrative    · The left ventricle is normal in size with concentric hypertrophy and   moderately  decreased systolic function.  · Grade I left ventricular diastolic dysfunction.  · The estimated PA systolic pressure is 55 mmHg.  · Normal right ventricular size with normal right ventricular systolic   function.  · There is pulmonary hypertension.  · Elevated central venous pressure (15 mmHg).  · The estimated ejection fraction is 30%.  · There are segmental left ventricular wall motion abnormalities.  · Mild aortic regurgitation.  · Mild mitral regurgitation.  · Mild tricuspid regurgitation.      , EKG: Reviewed, and X-Ray: CXR: X-Ray Chest 1 View (CXR): No results found for this visit on 05/24/22. and X-Ray Chest PA and Lateral (CXR): No results found for this visit on 05/24/22.

## 2022-05-31 ENCOUNTER — EXTERNAL CHRONIC CARE MANAGEMENT (OUTPATIENT)
Dept: PRIMARY CARE CLINIC | Facility: CLINIC | Age: 82
End: 2022-05-31
Payer: MEDICARE

## 2022-05-31 PROCEDURE — 99490 PR CHRONIC CARE MGMT, 1ST 20 MIN: ICD-10-PCS | Mod: S$PBB,,, | Performed by: FAMILY MEDICINE

## 2022-05-31 PROCEDURE — 99490 CHRNC CARE MGMT STAFF 1ST 20: CPT | Mod: PBBFAC | Performed by: FAMILY MEDICINE

## 2022-05-31 PROCEDURE — 99439 CHRNC CARE MGMT STAF EA ADDL: CPT | Mod: S$PBB,,, | Performed by: FAMILY MEDICINE

## 2022-05-31 PROCEDURE — 99490 CHRNC CARE MGMT STAFF 1ST 20: CPT | Mod: S$PBB,,, | Performed by: FAMILY MEDICINE

## 2022-05-31 PROCEDURE — 99439 PR CHRONIC CARE MGMT, EA ADDTL 20 MIN: ICD-10-PCS | Mod: S$PBB,,, | Performed by: FAMILY MEDICINE

## 2022-05-31 PROCEDURE — 99439 CHRNC CARE MGMT STAF EA ADDL: CPT | Mod: 27,PBBFAC | Performed by: FAMILY MEDICINE

## 2022-06-06 ENCOUNTER — OFFICE VISIT (OUTPATIENT)
Dept: CARDIOLOGY | Facility: CLINIC | Age: 82
End: 2022-06-06
Payer: MEDICARE

## 2022-06-06 VITALS
OXYGEN SATURATION: 98 % | HEIGHT: 61 IN | BODY MASS INDEX: 24.15 KG/M2 | DIASTOLIC BLOOD PRESSURE: 82 MMHG | HEART RATE: 70 BPM | WEIGHT: 127.88 LBS | SYSTOLIC BLOOD PRESSURE: 118 MMHG

## 2022-06-06 DIAGNOSIS — Q24.5 CORONARY-MYOCARDIAL BRIDGE: ICD-10-CM

## 2022-06-06 DIAGNOSIS — I42.8 NONISCHEMIC CARDIOMYOPATHY: ICD-10-CM

## 2022-06-06 DIAGNOSIS — E78.5 HYPERLIPIDEMIA LDL GOAL <100: ICD-10-CM

## 2022-06-06 DIAGNOSIS — I10 ESSENTIAL HYPERTENSION: ICD-10-CM

## 2022-06-06 DIAGNOSIS — I21.4 NSTEMI (NON-ST ELEVATED MYOCARDIAL INFARCTION): Primary | ICD-10-CM

## 2022-06-06 DIAGNOSIS — I20.89 OTHER FORMS OF ANGINA PECTORIS: ICD-10-CM

## 2022-06-06 PROCEDURE — 99999 PR PBB SHADOW E&M-EST. PATIENT-LVL III: ICD-10-PCS | Mod: PBBFAC,,, | Performed by: INTERNAL MEDICINE

## 2022-06-06 PROCEDURE — 99214 OFFICE O/P EST MOD 30 MIN: CPT | Mod: S$PBB,,, | Performed by: INTERNAL MEDICINE

## 2022-06-06 PROCEDURE — 99214 PR OFFICE/OUTPT VISIT, EST, LEVL IV, 30-39 MIN: ICD-10-PCS | Mod: S$PBB,,, | Performed by: INTERNAL MEDICINE

## 2022-06-06 PROCEDURE — 99999 PR PBB SHADOW E&M-EST. PATIENT-LVL III: CPT | Mod: PBBFAC,,, | Performed by: INTERNAL MEDICINE

## 2022-06-06 PROCEDURE — 99213 OFFICE O/P EST LOW 20 MIN: CPT | Mod: PBBFAC | Performed by: INTERNAL MEDICINE

## 2022-06-06 RX ORDER — RAMIPRIL 10 MG/1
10 CAPSULE ORAL DAILY
Status: ON HOLD | COMMUNITY
End: 2022-12-19 | Stop reason: HOSPADM

## 2022-06-06 RX ORDER — CLOPIDOGREL BISULFATE 75 MG/1
75 TABLET ORAL DAILY
Qty: 30 TABLET | Refills: 11 | Status: SHIPPED | OUTPATIENT
Start: 2022-06-06 | End: 2022-06-17 | Stop reason: SDUPTHER

## 2022-06-06 NOTE — PROGRESS NOTES
Subjective:   Patient ID:  Leslie Wood is a 81 y.o. female who presents for evaluation of No chief complaint on file.      6.6.2022  Comes in for follow-up.  She was admitted twice to the hospital last month with chest pain.  Had 2 heart catheterization.  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  Her EF is low.  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  She denies any more chest pain.  Her memory loss has been worse as per prior visit with her daughter in the hospital.  No bleeding  Also seen Dr. Carl for 2nd opinion    Interval hx: 2/11/2021   Complains of occipital headache  No chest pain, no dyspnea  States she is doing rehab and sometimes her BP is 200   Not taking lasix. On imdur  Esr came back normal after last visit       Initial HPI: 10/1/2020 Patient 80-year-old, prediabetic, with history of hypertension, who recently had a stroke about 3 months ago and was hospitalized at [facility].  She does not recall what kind of cardiac workup she underwent while at that hospital.  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  Her left-sided weakness has improved since the stroke.  She is undergoing rehab.  And should undergo speech therapy soon as she states.  She reports compliance with her blood pressure medications, however she reports feeling dizzy when she stands up, she does not get dizzy or any other circumstances.  She states that she is not drinking enough water.  Also she complains of bilateral lower extremity mild swelling that comes on and off when she takes or not take her amlodipine.  She denies any chest pain, dyspnea, orthopnea, PND, palpitations, syncope, presyncope, bleeding.                Past Medical History:   Diagnosis Date    Arthritis     Cataract     GERD (gastroesophageal reflux disease)     Hyperlipidemia     Hypertension     Stroke        Past Surgical History:   Procedure  Laterality Date    ADENOIDECTOMY      ADRENAL GLAND SURGERY      APPENDECTOMY      BACK SURGERY      fusion l 4-5 s 1,2,3  fusion l 2-3    CORONARY ANGIOGRAPHY N/A 5/20/2022    Procedure: ANGIOGRAM, CORONARY ARTERY;  Surgeon: Domingo Martins MD;  Location: [facility] CATH LAB;  Service: Cardiology;  Laterality: N/A;    CORONARY ANGIOGRAPHY N/A 5/24/2022    Procedure: ANGIOGRAM, CORONARY ARTERY;  Surgeon: Domingo Martins MD;  Location: [facility] CATH LAB;  Service: Cardiology;  Laterality: N/A;    EYE SURGERY      HEMORRHOID SURGERY      HERNIA REPAIR      HYSTERECTOMY      indirect lumbar decompression      percutaneous placement of interspinous extension blocker    LEFT HEART CATHETERIZATION Left 5/20/2022    Procedure: CATHETERIZATION, HEART, LEFT;  Surgeon: Domingo Martins MD;  Location: [facility] CATH LAB;  Service: Cardiology;  Laterality: Left;    TONSILLECTOMY         Social History     Tobacco Use    Smoking status: Never Smoker    Smokeless tobacco: Never Used   Substance Use Topics    Alcohol use: No    Drug use: No       Family History   Problem Relation Age of Onset    Heart disease Mother     Hypertension Mother     Diabetes Mother     Hypertension Father     Kidney disease Father     Breast cancer Sister        Review of Systems   Constitutional: Negative for fever and malaise/fatigue.   HENT: Negative for sore throat.    Eyes: Negative for blurred vision.   Cardiovascular: Negative for chest pain, claudication, cyanosis, dyspnea on exertion, irregular heartbeat, leg swelling, near-syncope, orthopnea, palpitations, paroxysmal nocturnal dyspnea and syncope.   Respiratory: Negative for cough, hemoptysis and shortness of breath.    Hematologic/Lymphatic: Negative for bleeding problem.   Skin: Negative for poor wound healing and rash.   Musculoskeletal: Negative for back pain and falls.   Gastrointestinal: Negative for abdominal pain.   Genitourinary: Negative for nocturia.   Neurological:  Positive for headaches. Negative for light-headedness and loss of balance.   Psychiatric/Behavioral: Negative for altered mental status and substance abuse.       Current Outpatient Medications on File Prior to Visit   Medication Sig    aspirin (ECOTRIN) 81 MG EC tablet Take 81 mg by mouth once daily.    atorvastatin (LIPITOR) 80 MG tablet Take 1 tablet (80 mg total) by mouth every evening.    metoprolol succinate (TOPROL-XL) 50 MG 24 hr tablet Take 1 tablet (50 mg total) by mouth once daily.    ramipriL (ALTACE) 10 MG capsule Take 10 mg by mouth once daily.    [DISCONTINUED] clopidogreL (PLAVIX) 75 mg tablet Take 1 tablet (75 mg total) by mouth once daily.    traZODone (DESYREL) 50 MG tablet TAKE 1 TABLET BY MOUTH ONCE AT BEDTIME.    [DISCONTINUED] carvediloL (COREG) 6.25 MG tablet Take 1 tablet (6.25 mg total) by mouth 2 (two) times daily. TAKE (1) TABLET BY MOUTH 2 TIMES A DAY WITH MEALS    [DISCONTINUED] cloNIDine (CATAPRES) 0.1 MG tablet Take 1 tablet (0.1 mg total) by mouth 2 (two) times daily. To take an extra dose if BP >160/90    [DISCONTINUED] diclofenac sodium (VOLTAREN) 1 % Gel Apply 2 g topically 3 (three) times daily.    [DISCONTINUED] furosemide (LASIX) 20 MG tablet Take 1 tablet (20 mg total) by mouth once daily.    [DISCONTINUED] isosorbide mononitrate (IMDUR) 30 MG 24 hr tablet TAKE 1 TABLET BY MOUTH TWICE A DAY    [DISCONTINUED] metoprolol tartrate (LOPRESSOR) 25 MG tablet Take 1 tablet (25 mg total) by mouth 3 (three) times daily.    [DISCONTINUED] nitroGLYCERIN (NITROSTAT) 0.4 MG SL tablet Place 1 tablet (0.4 mg total) under the tongue every 5 (five) minutes as needed for Chest pain.    [DISCONTINUED] valsartan (DIOVAN) 160 MG tablet TAKE 1 TABLET BY MOUTH TWICE A DAY     No current facility-administered medications on file prior to visit.       Objective:   Objective:  Wt Readings from Last 3 Encounters:   06/06/22 58 kg (127 lb 13.9 oz)   05/25/22 57.9 kg (127 lb 10.3 oz)    05/21/22 61.2 kg (134 lb 14.7 oz)     Temp Readings from Last 3 Encounters:   05/25/22 98.3 °F (36.8 °C) (Oral)   05/21/22 99 °F (37.2 °C)     BP Readings from Last 3 Encounters:   06/06/22 118/82   05/25/22 (!) 126/58   05/21/22 (!) 99/51     Pulse Readings from Last 3 Encounters:   06/06/22 70   05/25/22 73   05/21/22 64       Physical Exam  Vitals reviewed.   Constitutional:       Appearance: She is well-developed.   HENT:      Head: Normocephalic and atraumatic.   Eyes:      General: No scleral icterus.     Conjunctiva/sclera: Conjunctivae normal.   Cardiovascular:      Rate and Rhythm: Normal rate and regular rhythm.      Pulses: Intact distal pulses.      Heart sounds: Normal heart sounds. No murmur heard.     Pulmonary:      Effort: No respiratory distress.      Breath sounds: No wheezing or rales.   Chest:      Chest wall: No tenderness.   Abdominal:      General: Bowel sounds are normal. There is no distension.      Palpations: Abdomen is soft.      Tenderness: There is no guarding.   Musculoskeletal:         General: Normal range of motion.      Cervical back: Normal range of motion and neck supple.   Skin:     General: Skin is warm.   Neurological:      Mental Status: She is alert and oriented to person, place, and time.         Lab Results   Component Value Date    CHOL 155 05/20/2022    CHOL 166 11/10/2021    CHOL 101 (L) 09/16/2020     Lab Results   Component Value Date    HDL 45 05/20/2022    HDL 41 11/10/2021    HDL 46 09/16/2020     Lab Results   Component Value Date    LDLCALC 98.0 05/20/2022    LDLCALC 102.0 11/10/2021    LDLCALC 37.0 (L) 09/16/2020     Lab Results   Component Value Date    TRIG 60 05/20/2022    TRIG 115 11/10/2021    TRIG 90 09/16/2020     Lab Results   Component Value Date    CHOLHDL 29.0 05/20/2022    CHOLHDL 24.7 11/10/2021    CHOLHDL 45.5 09/16/2020       Chemistry        Component Value Date/Time     05/25/2022 0649    K 3.7 05/25/2022 0649     05/25/2022 0649     CO2 22 (L) 05/25/2022 0649    BUN 17 05/25/2022 0649    CREATININE 0.7 05/25/2022 0649     (H) 05/25/2022 0649        Component Value Date/Time    CALCIUM 8.6 (L) 05/25/2022 0649    ALKPHOS 73 05/24/2022 0918    AST 49 (H) 05/24/2022 0918    ALT 47 (H) 05/24/2022 0918    BILITOT 0.9 05/24/2022 0918    ESTGFRAFRICA >60 05/25/2022 0649    EGFRNONAA >60 05/25/2022 0649          Lab Results   Component Value Date    TSH 0.764 12/06/2021     Lab Results   Component Value Date    INR 1.1 05/24/2022    INR 1.0 05/24/2022    INR 1.0 05/19/2022     Lab Results   Component Value Date    WBC 7.75 05/25/2022    HGB 11.2 (L) 05/25/2022    HCT 37.3 05/25/2022    MCV 97 05/25/2022     05/25/2022     BNP  @LABRCNTIP(BNP,BNPTRIAGEBLO)@  CrCl cannot be calculated (Patient's most recent lab result is older than the maximum 7 days allowed.).     Imaging:  ======  Results for orders placed during the hospital encounter of 03/02/20   Echo Color Flow Doppler? Yes    Narrative · Mild concentric left ventricular hypertrophy.  · Normal left ventricular systolic function. The estimated ejection   fraction is 60%.  · Normal LV diastolic function.  · Normal right ventricular systolic function.  · Mild aortic regurgitation.  · Normal central venous pressure (3 mmHg).  · Trivial pericardial effusion.        No results found for this or any previous visit.  No results found for this or any previous visit.  Results for orders placed during the hospital encounter of 05/11/17   X-Ray Chest PA And Lateral    Narrative XR CHEST PA AND LATERAL, 05/11/17 11:38:46    Clinical indication: Chest pain.    Findings:  Comparison with 05/10/2017.    Epidural leads within the dorsal thoracic spine.  Lower thoracic wedge compression fracture status post kyphoplasty.  Left upper quadrant surgical clips.    Heart size is normal.  Prominent left pericardial fat pad.    Aortic arch calcification.    Lung fields remain clear.    Impression      Stable  chest x-ray.  No acute findings.      Electronically signed by: EMERY SAMAYOA MD  Date:     05/11/17  Time:    12:07      No results found for this or any previous visit.  No procedure found.    Diagnostic Results:  ECG: Reviewed  Marked sinus bradycardia with sinus arrhythmia   Abnormal ECG   When compared with ECG of 28-OCT-2020 16:09,   Criteria for Septal infarct are no longer Present   T wave inversion no longer evident in Anterior leads   Confirmed by MD RODERICK, BARB ([id number]) on 11/12/2020 9:25:42 PM       Results for orders placed during the hospital encounter of 05/24/22    Cardiac catheterization    Conclusion  · The estimated blood loss was none.  · There is severe mid LAD intramyocardial bridging improved with betablockers intra op  · There was no evidence of an acute atherothrombotic lesion    The procedure log was documented by Documenter: Te Waller and verified by Domingo Martins MD.    Date: 5/24/2022  Time: 9:49 AM    The ASCVD Risk score (Carmen DC Jr., et al., 2013) failed to calculate for the following reasons:    The 2013 ASCVD risk score is only valid for ages 40 to 79    The patient has a prior MI or stroke diagnosis    Assessment and Plan:   NSTEMI (non-ST elevated myocardial infarction)  -     clopidogreL (PLAVIX) 75 mg tablet; Take 1 tablet (75 mg total) by mouth once daily.  Dispense: 30 tablet; Refill: 11    Essential hypertension    Hyperlipidemia LDL goal <100    Other forms of angina pectoris    Coronary-myocardial bridge    Nonischemic cardiomyopathy      Reviewed all tests and above medical conditions with patient in detail and formulated treatment plan.  Risk factor modification discussed.   Cardiac low salt diet discussed.  Maintaining healthy weight and weight loss goals were discussed in clinic.  Hypertension controlled, continue with beta-blocker, chest pain-free  On ramipril     Follow up in 6 months

## 2022-06-07 ENCOUNTER — PATIENT OUTREACH (OUTPATIENT)
Dept: ADMINISTRATIVE | Facility: HOSPITAL | Age: 82
End: 2022-06-07
Payer: MEDICARE

## 2022-06-08 ENCOUNTER — OFFICE VISIT (OUTPATIENT)
Dept: INTERNAL MEDICINE | Facility: CLINIC | Age: 82
End: 2022-06-08
Payer: MEDICARE

## 2022-06-08 VITALS
HEART RATE: 78 BPM | HEIGHT: 61 IN | SYSTOLIC BLOOD PRESSURE: 118 MMHG | DIASTOLIC BLOOD PRESSURE: 62 MMHG | RESPIRATION RATE: 18 BRPM | OXYGEN SATURATION: 98 % | WEIGHT: 128.5 LBS | BODY MASS INDEX: 24.26 KG/M2 | TEMPERATURE: 98 F

## 2022-06-08 DIAGNOSIS — R42 DIZZINESS: ICD-10-CM

## 2022-06-08 DIAGNOSIS — Z09 HOSPITAL DISCHARGE FOLLOW-UP: Primary | ICD-10-CM

## 2022-06-08 DIAGNOSIS — I10 ESSENTIAL HYPERTENSION: ICD-10-CM

## 2022-06-08 DIAGNOSIS — I69.354 HEMIPLEGIA AND HEMIPARESIS FOLLOWING CEREBRAL INFARCTION AFFECTING LEFT NON-DOMINANT SIDE: ICD-10-CM

## 2022-06-08 DIAGNOSIS — R53.81 DEBILITY: ICD-10-CM

## 2022-06-08 LAB
POC ACTIVATED CLOTTING TIME K: 148 SEC (ref 74–137)
SAMPLE: ABNORMAL

## 2022-06-08 PROCEDURE — 99214 OFFICE O/P EST MOD 30 MIN: CPT | Mod: S$PBB,,, | Performed by: FAMILY MEDICINE

## 2022-06-08 PROCEDURE — 99999 PR PBB SHADOW E&M-EST. PATIENT-LVL IV: ICD-10-PCS | Mod: PBBFAC,,, | Performed by: FAMILY MEDICINE

## 2022-06-08 PROCEDURE — 99999 PR PBB SHADOW E&M-EST. PATIENT-LVL IV: CPT | Mod: PBBFAC,,, | Performed by: FAMILY MEDICINE

## 2022-06-08 PROCEDURE — 99214 PR OFFICE/OUTPT VISIT, EST, LEVL IV, 30-39 MIN: ICD-10-PCS | Mod: S$PBB,,, | Performed by: FAMILY MEDICINE

## 2022-06-08 PROCEDURE — 99214 OFFICE O/P EST MOD 30 MIN: CPT | Mod: PBBFAC | Performed by: FAMILY MEDICINE

## 2022-06-08 RX ORDER — TAPENTADOL HYDROCHLORIDE 50 MG/1
50 TABLET, FILM COATED ORAL EVERY 8 HOURS PRN
Status: ON HOLD | COMMUNITY
Start: 2022-05-17 | End: 2022-11-15 | Stop reason: SDUPTHER

## 2022-06-08 RX ORDER — TRAMADOL HYDROCHLORIDE 50 MG/1
50 TABLET ORAL EVERY 12 HOURS PRN
Status: ON HOLD | COMMUNITY
Start: 2022-05-17 | End: 2022-11-15 | Stop reason: SDUPTHER

## 2022-06-08 NOTE — PROGRESS NOTES
Subjective:       Patient ID: Leslie Wood is a 81 y.o. female.    Chief Complaint: Hospital Follow Up    HPI Ms. Wood presents today for hospital follow up.     In the hospital 5/19/2022 had an MI she ended up going back 5/24/22 having another heart attack.   Laid her brother to rest on 5/19/2022     She feels good today     Transitional Care Note    Family and/or Caretaker present at visit?  Yes.  Diagnostic tests reviewed/disposition: No diagnosic tests pending after this hospitalization.  Disease/illness education: some  Home health/community services discussion/referrals: Patient does not have home health established from hospital visit.  They had home health with [facility] need home health.  If needed, we will set up home health for the patient.   Establishment or re-establishment of referral orders for community resources: No other necessary community resources.   Discussion with other health care providers: No discussion with other health care providers necessary.           Review of Systems   Constitutional: Negative for activity change, appetite change, fatigue and fever.   HENT: Negative for congestion, ear pain and sinus pressure.    Eyes: Negative for pain and visual disturbance.   Respiratory: Negative for cough, chest tightness and wheezing.    Cardiovascular: Negative for chest pain, palpitations and leg swelling.   Gastrointestinal: Negative for abdominal distention, abdominal pain, constipation, diarrhea, nausea and vomiting.   Endocrine: Negative for polydipsia and polyuria.   Genitourinary: Negative for difficulty urinating, dyspareunia, dysuria, flank pain and hematuria.   Musculoskeletal: Negative for arthralgias and back pain.   Skin: Negative for rash.   Neurological: Negative for dizziness, tremors, syncope, weakness, numbness and headaches.   Psychiatric/Behavioral: Negative for agitation and confusion.           Past Medical History:   Diagnosis Date    Arthritis     Cataract      GERD (gastroesophageal reflux disease)     Hyperlipidemia     Hypertension     Stroke      Past Surgical History:   Procedure Laterality Date    ADENOIDECTOMY      ADRENAL GLAND SURGERY      APPENDECTOMY      BACK SURGERY      fusion l 4-5 s 1,2,3  fusion l 2-3    CORONARY ANGIOGRAPHY N/A 5/20/2022    Procedure: ANGIOGRAM, CORONARY ARTERY;  Surgeon: Domingo Martins MD;  Location: [facility] CATH LAB;  Service: Cardiology;  Laterality: N/A;    CORONARY ANGIOGRAPHY N/A 5/24/2022    Procedure: ANGIOGRAM, CORONARY ARTERY;  Surgeon: Domingo Martins MD;  Location: [facility] CATH LAB;  Service: Cardiology;  Laterality: N/A;    EYE SURGERY      HEMORRHOID SURGERY      HERNIA REPAIR      HYSTERECTOMY      indirect lumbar decompression      percutaneous placement of interspinous extension blocker    LEFT HEART CATHETERIZATION Left 5/20/2022    Procedure: CATHETERIZATION, HEART, LEFT;  Surgeon: Domingo Martins MD;  Location: [facility] CATH LAB;  Service: Cardiology;  Laterality: Left;    TONSILLECTOMY       Family History   Problem Relation Age of Onset    Heart disease Mother     Hypertension Mother     Diabetes Mother     Hypertension Father     Kidney disease Father     Breast cancer Sister      Social History     Socioeconomic History    Marital status:    Tobacco Use    Smoking status: Never Smoker    Smokeless tobacco: Never Used   Substance and Sexual Activity    Alcohol use: No    Drug use: No    Sexual activity: Not Currently       Objective:        Physical Exam      Results for orders placed or performed during the hospital encounter of 05/24/22   CBC auto differential   Result Value Ref Range    WBC 9.92 3.90 - 12.70 K/uL    RBC 4.48 4.00 - 5.40 M/uL    Hemoglobin 13.1 12.0 - 16.0 g/dL    Hematocrit 40.1 37.0 - 48.5 %    MCV 90 82 - 98 fL    MCH 29.2 27.0 - 31.0 pg    MCHC 32.7 32.0 - 36.0 g/dL    RDW 13.6 11.5 - 14.5 %    Platelets 307 150 - 450 K/uL    MPV 9.7 9.2 - 12.9 fL    Immature  Granulocytes 0.4 0.0 - 0.5 %    Gran # (ANC) 6.3 1.8 - 7.7 K/uL    Immature Grans (Abs) 0.04 0.00 - 0.04 K/uL    Lymph # 2.3 1.0 - 4.8 K/uL    Mono # 1.1 (H) 0.3 - 1.0 K/uL    Eos # 0.2 0.0 - 0.5 K/uL    Baso # 0.04 0.00 - 0.20 K/uL    nRBC 0 0 /100 WBC    Gran % 63.3 38.0 - 73.0 %    Lymph % 23.0 18.0 - 48.0 %    Mono % 11.0 4.0 - 15.0 %    Eosinophil % 1.9 0.0 - 8.0 %    Basophil % 0.4 0.0 - 1.9 %    Platelet Estimate Appears normal     Differential Method Automated    Comprehensive metabolic panel   Result Value Ref Range    Sodium 148 (H) 136 - 145 mmol/L    Potassium 3.4 (L) 3.5 - 5.1 mmol/L    Chloride 108 95 - 110 mmol/L    CO2 23 23 - 29 mmol/L    Glucose 157 (H) 70 - 110 mg/dL    BUN 19 8 - 23 mg/dL    Creatinine 1.0 0.5 - 1.4 mg/dL    Calcium 9.4 8.7 - 10.5 mg/dL    Total Protein 7.2 6.0 - 8.4 g/dL    Albumin 3.6 3.5 - 5.2 g/dL    Total Bilirubin 0.9 0.1 - 1.0 mg/dL    Alkaline Phosphatase 73 55 - 135 U/L    AST 49 (H) 10 - 40 U/L    ALT 47 (H) 10 - 44 U/L    Anion Gap 17 (H) 8 - 16 mmol/L    eGFR if African American >60 >60 mL/min/1.73 m^2    eGFR if non African American 52 (A) >60 mL/min/1.73 m^2   Troponin I #1   Result Value Ref Range    Troponin I 1.914 (H) 0.000 - 0.026 ng/mL   Protime-INR   Result Value Ref Range    Prothrombin Time 10.6 9.0 - 12.5 sec    INR 1.0 0.8 - 1.2   APTT   Result Value Ref Range    aPTT 23.6 21.0 - 32.0 sec   APTT   Result Value Ref Range    aPTT 107.6 (H) 21.0 - 32.0 sec   Protime-INR   Result Value Ref Range    Prothrombin Time 11.4 9.0 - 12.5 sec    INR 1.1 0.8 - 1.2   CBC auto differential   Result Value Ref Range    WBC 9.06 3.90 - 12.70 K/uL    RBC 4.04 4.00 - 5.40 M/uL    Hemoglobin 12.0 12.0 - 16.0 g/dL    Hematocrit 37.7 37.0 - 48.5 %    MCV 93 82 - 98 fL    MCH 29.7 27.0 - 31.0 pg    MCHC 31.8 (L) 32.0 - 36.0 g/dL    RDW 13.5 11.5 - 14.5 %    Platelets 258 150 - 450 K/uL    MPV 10.0 9.2 - 12.9 fL    Immature Granulocytes 0.3 0.0 - 0.5 %    Gran # (ANC) 5.7 1.8 -  7.7 K/uL    Immature Grans (Abs) 0.03 0.00 - 0.04 K/uL    Lymph # 2.1 1.0 - 4.8 K/uL    Mono # 1.1 (H) 0.3 - 1.0 K/uL    Eos # 0.1 0.0 - 0.5 K/uL    Baso # 0.03 0.00 - 0.20 K/uL    nRBC 0 0 /100 WBC    Gran % 62.7 38.0 - 73.0 %    Lymph % 22.7 18.0 - 48.0 %    Mono % 12.5 4.0 - 15.0 %    Eosinophil % 1.5 0.0 - 8.0 %    Basophil % 0.3 0.0 - 1.9 %    Differential Method Automated    Troponin I #2   Result Value Ref Range    Troponin I 1.867 (H) 0.000 - 0.026 ng/mL   APTT   Result Value Ref Range    aPTT 56.2 (H) 21.0 - 32.0 sec   Basic metabolic panel   Result Value Ref Range    Sodium 144 136 - 145 mmol/L    Potassium 3.7 3.5 - 5.1 mmol/L    Chloride 110 95 - 110 mmol/L    CO2 22 (L) 23 - 29 mmol/L    Glucose 123 (H) 70 - 110 mg/dL    BUN 17 8 - 23 mg/dL    Creatinine 0.7 0.5 - 1.4 mg/dL    Calcium 8.6 (L) 8.7 - 10.5 mg/dL    Anion Gap 12 8 - 16 mmol/L    eGFR if African American >60 >60 mL/min/1.73 m^2    eGFR if non African American >60 >60 mL/min/1.73 m^2   CBC auto differential   Result Value Ref Range    WBC 7.75 3.90 - 12.70 K/uL    RBC 3.84 (L) 4.00 - 5.40 M/uL    Hemoglobin 11.2 (L) 12.0 - 16.0 g/dL    Hematocrit 37.3 37.0 - 48.5 %    MCV 97 82 - 98 fL    MCH 29.2 27.0 - 31.0 pg    MCHC 30.0 (L) 32.0 - 36.0 g/dL    RDW 13.8 11.5 - 14.5 %    Platelets 254 150 - 450 K/uL    MPV 9.9 9.2 - 12.9 fL    Immature Granulocytes 0.4 0.0 - 0.5 %    Gran # (ANC) 4.1 1.8 - 7.7 K/uL    Immature Grans (Abs) 0.03 0.00 - 0.04 K/uL    Lymph # 2.3 1.0 - 4.8 K/uL    Mono # 1.1 (H) 0.3 - 1.0 K/uL    Eos # 0.2 0.0 - 0.5 K/uL    Baso # 0.08 0.00 - 0.20 K/uL    nRBC 0 0 /100 WBC    Gran % 52.4 38.0 - 73.0 %    Lymph % 29.7 18.0 - 48.0 %    Mono % 13.5 4.0 - 15.0 %    Eosinophil % 3.0 0.0 - 8.0 %    Basophil % 1.0 0.0 - 1.9 %    Differential Method Automated    APTT   Result Value Ref Range    aPTT 37.1 (H) 21.0 - 32.0 sec   ISTAT ACT-K   Result Value Ref Range    POC ACTIVATED CLOTTING TIME K 160 (H) 74 - 137 sec    Sample unknown     ISTAT ACT-K   Result Value Ref Range    POC ACTIVATED CLOTTING TIME K 142 (H) 74 - 137 sec    Sample unknown    POCT glucose   Result Value Ref Range    POCT Glucose 136 (H) 70 - 110 mg/dL   POCT glucose   Result Value Ref Range    POCT Glucose 130 (H) 70 - 110 mg/dL   POCT glucose   Result Value Ref Range    POCT Glucose 138 (H) 70 - 110 mg/dL   POCT glucose   Result Value Ref Range    POCT Glucose 132 (H) 70 - 110 mg/dL       Assessment/Plan:     Hospital discharge follow-up    Hemiplegia and hemiparesis following cerebral infarction affecting left non-dominant side  -     SUBSEQUENT HOME HEALTH ORDERS    Dizziness  -     SUBSEQUENT HOME HEALTH ORDERS    Essential hypertension  -     SUBSEQUENT HOME HEALTH ORDERS    Debility  -     SUBSEQUENT HOME HEALTH ORDERS          Follow up as needed    Angeles Carson MD  ON   Family Medicine

## 2022-06-09 ENCOUNTER — TELEPHONE (OUTPATIENT)
Dept: INTERNAL MEDICINE | Facility: CLINIC | Age: 82
End: 2022-06-09
Payer: MEDICARE

## 2022-06-09 ENCOUNTER — PROCEDURE VISIT (OUTPATIENT)
Dept: ORTHOPEDICS | Facility: CLINIC | Age: 82
End: 2022-06-09
Payer: MEDICARE

## 2022-06-09 VITALS
WEIGHT: 126 LBS | HEIGHT: 61 IN | SYSTOLIC BLOOD PRESSURE: 128 MMHG | DIASTOLIC BLOOD PRESSURE: 78 MMHG | BODY MASS INDEX: 23.79 KG/M2

## 2022-06-09 DIAGNOSIS — M17.11 PRIMARY OSTEOARTHRITIS OF RIGHT KNEE: Primary | ICD-10-CM

## 2022-06-09 PROCEDURE — 99499 NO LOS: ICD-10-PCS | Mod: S$PBB,,, | Performed by: PHYSICIAN ASSISTANT

## 2022-06-09 PROCEDURE — 99499 UNLISTED E&M SERVICE: CPT | Mod: S$PBB,,, | Performed by: PHYSICIAN ASSISTANT

## 2022-06-09 PROCEDURE — 20610 DRAIN/INJ JOINT/BURSA W/O US: CPT | Mod: S$PBB,RT,, | Performed by: PHYSICIAN ASSISTANT

## 2022-06-09 PROCEDURE — 20610 DRAIN/INJ JOINT/BURSA W/O US: CPT | Mod: PBBFAC | Performed by: PHYSICIAN ASSISTANT

## 2022-06-09 PROCEDURE — 20610 LARGE JOINT ASPIRATION/INJECTION: R KNEE: ICD-10-PCS | Mod: S$PBB,RT,, | Performed by: PHYSICIAN ASSISTANT

## 2022-06-09 RX ADMIN — TRIAMCINOLONE ACETONIDE EXTENDED-RELEASE INJECTABLE SUSPENSION 32 MG: KIT INTRA-ARTICULAR at 11:06

## 2022-06-09 NOTE — PROCEDURES
Large Joint Aspiration/Injection: R knee    Date/Time: 6/9/2022 11:30 AM  Performed by: Olivia Bailey PA-C  Authorized by: Olivia Bailey PA-C     Consent Done?:  Yes (Verbal)  Indications:  Arthritis and pain  Site marked: the procedure site was marked    Timeout: prior to procedure the correct patient, procedure, and site was verified    Prep: patient was prepped and draped in usual sterile fashion    Local anesthesia used?: No    Local anesthetic:  Topical anesthetic    Details:  Needle Size:  22 G  Ultrasonic Guidance for needle placement?: No    Approach:  Superior  Location:  Knee  Site:  R knee  Medications:  32 mg triamcinolone acetonide 32 mg  Patient tolerance:  Patient tolerated the procedure well with no immediate complications     Right Bryan

## 2022-06-09 NOTE — TELEPHONE ENCOUNTER
----- Message from Elizabeth Carlson sent at 6/9/2022  3:43 PM CDT -----  Contact: Izabela/ [facility]/ [phone]  Izabela with [facility], HH is calling to decline the order for home health aide. Also Izabela would like an order for a speech therapy eval, . Please call to advise.

## 2022-06-09 NOTE — TELEPHONE ENCOUNTER
----- Message from Manju Pyle sent at 6/9/2022 10:27 AM CDT -----  Contact: Shae/ [facility] would like a call back at [phone], in regards to clarifying orders that was sent over for pt.

## 2022-06-09 NOTE — TELEPHONE ENCOUNTER
FYI:  Patient and family refused the HH aid  So they are canceling the order.  They are also requesting a order for ST and I gave them a verbal ok

## 2022-06-17 ENCOUNTER — PATIENT MESSAGE (OUTPATIENT)
Dept: CARDIOLOGY | Facility: CLINIC | Age: 82
End: 2022-06-17
Payer: MEDICARE

## 2022-06-17 DIAGNOSIS — I21.4 NSTEMI (NON-ST ELEVATED MYOCARDIAL INFARCTION): ICD-10-CM

## 2022-06-17 RX ORDER — ATORVASTATIN CALCIUM 80 MG/1
80 TABLET, FILM COATED ORAL NIGHTLY
Qty: 90 TABLET | Refills: 3 | Status: SHIPPED | OUTPATIENT
Start: 2022-06-17 | End: 2022-12-15 | Stop reason: ALTCHOICE

## 2022-06-17 RX ORDER — CLOPIDOGREL BISULFATE 75 MG/1
75 TABLET ORAL DAILY
Qty: 90 TABLET | Refills: 3 | Status: SHIPPED | OUTPATIENT
Start: 2022-06-17 | End: 2022-11-10

## 2022-06-23 ENCOUNTER — EXTERNAL HOME HEALTH (OUTPATIENT)
Dept: HOME HEALTH SERVICES | Facility: HOSPITAL | Age: 82
End: 2022-06-23
Payer: MEDICARE

## 2022-06-30 ENCOUNTER — EXTERNAL CHRONIC CARE MANAGEMENT (OUTPATIENT)
Dept: PRIMARY CARE CLINIC | Facility: CLINIC | Age: 82
End: 2022-06-30
Payer: MEDICARE

## 2022-06-30 PROCEDURE — 99490 CHRNC CARE MGMT STAFF 1ST 20: CPT | Mod: PBBFAC | Performed by: FAMILY MEDICINE

## 2022-06-30 PROCEDURE — 99490 CHRNC CARE MGMT STAFF 1ST 20: CPT | Mod: S$PBB,,, | Performed by: FAMILY MEDICINE

## 2022-06-30 PROCEDURE — 99490 PR CHRONIC CARE MGMT, 1ST 20 MIN: ICD-10-PCS | Mod: S$PBB,,, | Performed by: FAMILY MEDICINE

## 2022-06-30 PROCEDURE — 99439 CHRNC CARE MGMT STAF EA ADDL: CPT | Mod: S$PBB,,, | Performed by: FAMILY MEDICINE

## 2022-06-30 PROCEDURE — 99439 CHRNC CARE MGMT STAF EA ADDL: CPT | Mod: PBBFAC | Performed by: FAMILY MEDICINE

## 2022-06-30 PROCEDURE — 99439 PR CHRONIC CARE MGMT, EA ADDTL 20 MIN: ICD-10-PCS | Mod: S$PBB,,, | Performed by: FAMILY MEDICINE

## 2022-07-08 ENCOUNTER — TELEPHONE (OUTPATIENT)
Dept: INTERNAL MEDICINE | Facility: CLINIC | Age: 82
End: 2022-07-08
Payer: MEDICARE

## 2022-07-08 NOTE — TELEPHONE ENCOUNTER
----- Message from Candy Camacho sent at 7/8/2022  1:37 PM CDT -----  Contact: Rosie Speech Therapist @    [phone]  Would like to discharge her next week due to max participation reached

## 2022-07-13 ENCOUNTER — TELEPHONE (OUTPATIENT)
Dept: INTERNAL MEDICINE | Facility: CLINIC | Age: 82
End: 2022-07-13
Payer: MEDICARE

## 2022-07-13 NOTE — TELEPHONE ENCOUNTER
FYI  [facility] request verbal order to discharge patient due to max potential reached  Verbal given to HH

## 2022-07-13 NOTE — TELEPHONE ENCOUNTER
----- Message from Fito Ga sent at 7/13/2022 11:19 AM CDT -----  Contact: [facility]  Isabelle is requesting a call to get a verbal order to discharge pt due to max potential reached. Please call her back at [phone].            Thanks  DD

## 2022-07-20 ENCOUNTER — TELEPHONE (OUTPATIENT)
Dept: INTERNAL MEDICINE | Facility: CLINIC | Age: 82
End: 2022-07-20
Payer: MEDICARE

## 2022-07-20 NOTE — TELEPHONE ENCOUNTER
----- Message from Patrice Bradley sent at 7/20/2022 12:12 PM CDT -----  Contact: [facility] [place] would like to consult with a nurse in regards to orders for the patient to continue home health. Please call back at  [phone]. Thanks r/s

## 2022-07-23 ENCOUNTER — HOSPITAL ENCOUNTER (EMERGENCY)
Facility: HOSPITAL | Age: 82
Discharge: HOME | End: 2022-07-24
Attending: EMERGENCY MEDICINE
Payer: MEDICARE

## 2022-07-23 DIAGNOSIS — R07.9 CHEST PAIN: ICD-10-CM

## 2022-07-23 LAB
ALBUMIN SERPL BCP-MCNC: 3.5 G/DL (ref 3.5–5.2)
ALP SERPL-CCNC: 78 U/L (ref 55–135)
ALT SERPL W/O P-5'-P-CCNC: 39 U/L (ref 10–44)
ANION GAP SERPL CALC-SCNC: 8 MMOL/L (ref 8–16)
AST SERPL-CCNC: 40 U/L (ref 10–40)
BASOPHILS # BLD AUTO: 0.04 K/UL (ref 0–0.2)
BASOPHILS NFR BLD: 0.4 % (ref 0–1.9)
BILIRUB SERPL-MCNC: 0.4 MG/DL (ref 0.1–1)
BNP SERPL-MCNC: 207 PG/ML (ref 0–99)
BUN SERPL-MCNC: 23 MG/DL (ref 8–23)
CALCIUM SERPL-MCNC: 9.4 MG/DL (ref 8.7–10.5)
CHLORIDE SERPL-SCNC: 110 MMOL/L (ref 95–110)
CO2 SERPL-SCNC: 24 MMOL/L (ref 23–29)
CREAT SERPL-MCNC: 0.7 MG/DL (ref 0.5–1.4)
DIFFERENTIAL METHOD: NORMAL
EOSINOPHIL # BLD AUTO: 0.1 K/UL (ref 0–0.5)
EOSINOPHIL NFR BLD: 1.1 % (ref 0–8)
ERYTHROCYTE [DISTWIDTH] IN BLOOD BY AUTOMATED COUNT: 14.1 % (ref 11.5–14.5)
EST. GFR  (AFRICAN AMERICAN): >60 ML/MIN/1.73 M^2
EST. GFR  (NON AFRICAN AMERICAN): >60 ML/MIN/1.73 M^2
GLUCOSE SERPL-MCNC: 107 MG/DL (ref 70–110)
HCT VFR BLD AUTO: 40.9 % (ref 37–48.5)
HGB BLD-MCNC: 13.2 G/DL (ref 12–16)
IMM GRANULOCYTES # BLD AUTO: 0.02 K/UL (ref 0–0.04)
IMM GRANULOCYTES NFR BLD AUTO: 0.2 % (ref 0–0.5)
LYMPHOCYTES # BLD AUTO: 2 K/UL (ref 1–4.8)
LYMPHOCYTES NFR BLD: 22.4 % (ref 18–48)
MCH RBC QN AUTO: 29.6 PG (ref 27–31)
MCHC RBC AUTO-ENTMCNC: 32.3 G/DL (ref 32–36)
MCV RBC AUTO: 92 FL (ref 82–98)
MONOCYTES # BLD AUTO: 0.9 K/UL (ref 0.3–1)
MONOCYTES NFR BLD: 9.9 % (ref 4–15)
NEUTROPHILS # BLD AUTO: 5.9 K/UL (ref 1.8–7.7)
NEUTROPHILS NFR BLD: 66 % (ref 38–73)
NRBC BLD-RTO: 0 /100 WBC
PLATELET # BLD AUTO: 229 K/UL (ref 150–450)
PMV BLD AUTO: 9.6 FL (ref 9.2–12.9)
POTASSIUM SERPL-SCNC: 4 MMOL/L (ref 3.5–5.1)
PROT SERPL-MCNC: 6.7 G/DL (ref 6–8.4)
RBC # BLD AUTO: 4.46 M/UL (ref 4–5.4)
SODIUM SERPL-SCNC: 142 MMOL/L (ref 136–145)
TROPONIN I SERPL DL<=0.01 NG/ML-MCNC: 0.01 NG/ML (ref 0–0.03)
WBC # BLD AUTO: 8.89 K/UL (ref 3.9–12.7)

## 2022-07-23 PROCEDURE — 93010 EKG 12-LEAD: ICD-10-PCS | Mod: ,,, | Performed by: INTERNAL MEDICINE

## 2022-07-23 PROCEDURE — 93005 ELECTROCARDIOGRAM TRACING: CPT

## 2022-07-23 PROCEDURE — 83880 ASSAY OF NATRIURETIC PEPTIDE: CPT | Performed by: EMERGENCY MEDICINE

## 2022-07-23 PROCEDURE — 85025 COMPLETE CBC W/AUTO DIFF WBC: CPT | Performed by: EMERGENCY MEDICINE

## 2022-07-23 PROCEDURE — 25000003 PHARM REV CODE 250: Performed by: EMERGENCY MEDICINE

## 2022-07-23 PROCEDURE — 93010 ELECTROCARDIOGRAM REPORT: CPT | Mod: ,,, | Performed by: INTERNAL MEDICINE

## 2022-07-23 PROCEDURE — 80053 COMPREHEN METABOLIC PANEL: CPT | Performed by: EMERGENCY MEDICINE

## 2022-07-23 PROCEDURE — 84484 ASSAY OF TROPONIN QUANT: CPT | Performed by: EMERGENCY MEDICINE

## 2022-07-23 PROCEDURE — 99285 EMERGENCY DEPT VISIT HI MDM: CPT | Mod: 25

## 2022-07-23 RX ORDER — BUPRENORPHINE HYDROCHLORIDE 75 UG/1
1 FILM, SOLUBLE BUCCAL 2 TIMES DAILY
COMMUNITY
Start: 2022-07-20 | End: 2022-10-19

## 2022-07-23 RX ADMIN — NITROGLYCERIN 1 INCH: 20 OINTMENT TOPICAL at 09:07

## 2022-07-24 VITALS
BODY MASS INDEX: 23.81 KG/M2 | SYSTOLIC BLOOD PRESSURE: 157 MMHG | TEMPERATURE: 98 F | HEIGHT: 61 IN | DIASTOLIC BLOOD PRESSURE: 72 MMHG | RESPIRATION RATE: 26 BRPM | HEART RATE: 55 BPM | OXYGEN SATURATION: 98 %

## 2022-07-24 LAB — TROPONIN I SERPL DL<=0.01 NG/ML-MCNC: 0.02 NG/ML (ref 0–0.03)

## 2022-07-24 NOTE — ED PROVIDER NOTES
SCRIBE #1 NOTE: ILeroy, am scribing for, and in the presence of, Jarett Lerner Jr., MD. I have scribed the entire note.       History     Chief Complaint   Patient presents with    Chest Pain     Chest pain substernal- nonradiating for 90 minutes, Hx heart attack x 2, 8 weeks ago. 89% O2 on room air, 97% 2LPM     Review of patient's allergies indicates:   Allergen Reactions    Prazosin Other (See Comments)     dizziness    Amitriptyline     Hydrochlorothiazide      Causes muscle cramping    Lisinopril      hyperkalemia    Percocet [oxycodone-acetaminophen] Other (See Comments)     Seizures    Codeine Nausea Only and Rash         History of Present Illness     HPI    7/23/2022, 9:06 PM   History obtained from the patient and daughter      History of Present Illness: Leslie Wood is a 82 y.o. female patient with a PMHx of HTN, HLD, and stroke who presents to the Emergency Department for evaluation of acute onset CP at 19:00. Episode lasted a few minutes and was similar to previous MI. Pt had NSTEMI on 5/19. Pt had STEMI on 5/24. No stents were placed. Prior to EMS arrival, pt was given 4 baby ASA and 1 nitro. Sxs have improved. Pt is established with Dr. Martins (Cardiology). Pt has a hx of HTN but notes she was taken off her HTN medications due to pt being hypotensive. Symptoms are episodic and moderate in severity. No mitigating or exacerbating factors reported. Associated sxs include nausea. Patient denies any vomiting, diaphoresis, SOB, leg swelling, fever, and all other sxs at this time. No prior tx reported. No further complaints or concerns at this time.       Arrival mode: Ambulance service    PCP: Angeles Carson MD        Past Medical History:  Past Medical History:   Diagnosis Date    Arthritis     Cataract     GERD (gastroesophageal reflux disease)     Heart attack 05/18/2022    Heart attack 05/23/2022    Hyperlipidemia     Hypertension     Stroke        Past Surgical  History:  Past Surgical History:   Procedure Laterality Date    ADENOIDECTOMY      ADRENAL GLAND SURGERY      APPENDECTOMY      BACK SURGERY      fusion l 4-5 s 1,2,3  fusion l 2-3    CORONARY ANGIOGRAPHY N/A 5/20/2022    Procedure: ANGIOGRAM, CORONARY ARTERY;  Surgeon: Domingo Martins MD;  Location: [facility] CATH LAB;  Service: Cardiology;  Laterality: N/A;    CORONARY ANGIOGRAPHY N/A 5/24/2022    Procedure: ANGIOGRAM, CORONARY ARTERY;  Surgeon: Domingo Martins MD;  Location: [facility] CATH LAB;  Service: Cardiology;  Laterality: N/A;    EYE SURGERY      HEMORRHOID SURGERY      HERNIA REPAIR      HYSTERECTOMY      indirect lumbar decompression      percutaneous placement of interspinous extension blocker    LEFT HEART CATHETERIZATION Left 5/20/2022    Procedure: CATHETERIZATION, HEART, LEFT;  Surgeon: Domingo Martins MD;  Location: [facility] CATH LAB;  Service: Cardiology;  Laterality: Left;    TONSILLECTOMY           Family History:  Family History   Problem Relation Age of Onset    Heart disease Mother     Hypertension Mother     Diabetes Mother     Hypertension Father     Kidney disease Father     Breast cancer Sister        Social History:  Social History     Tobacco Use    Smoking status: Never Smoker    Smokeless tobacco: Never Used   Substance and Sexual Activity    Alcohol use: No    Drug use: No    Sexual activity: Not Currently        Review of Systems     Review of Systems   Constitutional: Negative for diaphoresis and fever.   HENT: Negative for sore throat.    Respiratory: Negative for shortness of breath.    Cardiovascular: Positive for chest pain.   Gastrointestinal: Positive for nausea. Negative for vomiting.   Genitourinary: Negative for dysuria.   Musculoskeletal: Negative for back pain.   Skin: Negative for rash.   Neurological: Negative for weakness.   Hematological: Does not bruise/bleed easily.   All other systems reviewed and are negative.     Physical Exam     Initial Vitals  "[07/23/22 2042]   BP Pulse Resp Temp SpO2   (!) 140/61 62 18 97.9 °F (36.6 °C) 97 %      MAP       --          Physical Exam  Nursing Notes and Vital Signs Reviewed.  Constitutional: Patient is in no acute distress. Well-developed and well-nourished.  Head: Atraumatic. Normocephalic.  Eyes:  EOM intact.  No scleral icterus.  ENT: Mucous membranes are moist.  Nares clear   Neck:  Full ROM. No JVD.  Cardiovascular: Regular rate. Regular rhythm No murmurs, rubs, or gallops. Distal pulses are 2+ and symmetric  Pulmonary/Chest: No respiratory distress. Clear to auscultation bilaterally. No wheezing or rales.  Equal chest wall rise bilaterally  Abdominal: Soft and non-distended.  There is no tenderness.  No rebound, guarding, or rigidity. Good bowel sounds.  Genitourinary: No CVA tenderness.  No suprapubic tenderness  Musculoskeletal: Moves all extremities. No obvious deformities.  5 x 5 strength in all extremities   Skin: Warm and dry.  Neurological:  Alert, awake, and appropriate.  Normal speech.  No acute focal neurological deficits are appreciated.  Two through 12 intact bilaterally.  Psychiatric: Normal affect. Good eye contact. Appropriate in content.     ED Course   Procedures  ED Vital Signs:  Vitals:    07/23/22 2042 07/23/22 2110 07/23/22 2115 07/23/22 2215   BP: (!) 140/61  (!) 172/72 (!) 140/91   Pulse: 62 (!) 50 (!) 51 (!) 49   Resp: 18  (!) 24 (!) 24   Temp: 97.9 °F (36.6 °C)      TempSrc: Oral      SpO2: 97%  99% 99%   Height: 5' 1" (1.549 m)       07/24/22 0015 07/24/22 0100 07/24/22 0107   BP: (!) 147/67 (!) 157/72    Pulse: (!) 55 (!) 55    Resp: (!) 26 (!) 26    Temp:   98.2 °F (36.8 °C)   TempSrc:      SpO2: 98% 98%    Height:          Abnormal Lab Results:  Labs Reviewed   B-TYPE NATRIURETIC PEPTIDE - Abnormal; Notable for the following components:       Result Value     (*)     All other components within normal limits   TROPONIN I   CBC W/ AUTO DIFFERENTIAL   COMPREHENSIVE METABOLIC PANEL "   TROPONIN I        All Lab Results:  Results for orders placed or performed during the hospital encounter of 07/23/22   Troponin I #2   Result Value Ref Range    Troponin I 0.016 0.000 - 0.026 ng/mL   CBC auto differential   Result Value Ref Range    WBC 8.89 3.90 - 12.70 K/uL    RBC 4.46 4.00 - 5.40 M/uL    Hemoglobin 13.2 12.0 - 16.0 g/dL    Hematocrit 40.9 37.0 - 48.5 %    MCV 92 82 - 98 fL    MCH 29.6 27.0 - 31.0 pg    MCHC 32.3 32.0 - 36.0 g/dL    RDW 14.1 11.5 - 14.5 %    Platelets 229 150 - 450 K/uL    MPV 9.6 9.2 - 12.9 fL    Immature Granulocytes 0.2 0.0 - 0.5 %    Gran # (ANC) 5.9 1.8 - 7.7 K/uL    Immature Grans (Abs) 0.02 0.00 - 0.04 K/uL    Lymph # 2.0 1.0 - 4.8 K/uL    Mono # 0.9 0.3 - 1.0 K/uL    Eos # 0.1 0.0 - 0.5 K/uL    Baso # 0.04 0.00 - 0.20 K/uL    nRBC 0 0 /100 WBC    Gran % 66.0 38.0 - 73.0 %    Lymph % 22.4 18.0 - 48.0 %    Mono % 9.9 4.0 - 15.0 %    Eosinophil % 1.1 0.0 - 8.0 %    Basophil % 0.4 0.0 - 1.9 %    Differential Method Automated    Comprehensive metabolic panel   Result Value Ref Range    Sodium 142 136 - 145 mmol/L    Potassium 4.0 3.5 - 5.1 mmol/L    Chloride 110 95 - 110 mmol/L    CO2 24 23 - 29 mmol/L    Glucose 107 70 - 110 mg/dL    BUN 23 8 - 23 mg/dL    Creatinine 0.7 0.5 - 1.4 mg/dL    Calcium 9.4 8.7 - 10.5 mg/dL    Total Protein 6.7 6.0 - 8.4 g/dL    Albumin 3.5 3.5 - 5.2 g/dL    Total Bilirubin 0.4 0.1 - 1.0 mg/dL    Alkaline Phosphatase 78 55 - 135 U/L    AST 40 10 - 40 U/L    ALT 39 10 - 44 U/L    Anion Gap 8 8 - 16 mmol/L    eGFR if African American >60 >60 mL/min/1.73 m^2    eGFR if non African American >60 >60 mL/min/1.73 m^2   Troponin I #1   Result Value Ref Range    Troponin I 0.015 0.000 - 0.026 ng/mL   BNP   Result Value Ref Range     (H) 0 - 99 pg/mL       Imaging Results:  Imaging Results          X-Ray Chest AP Portable (Final result)  Result time 07/23/22 21:29:05    Final result by Patti Workman MD (07/23/22 21:29:05)                 Impression:       No acute abnormality.      Electronically signed by: Ji Armstrong  Date:    07/23/2022  Time:    21:29             Narrative:    EXAMINATION:  XR CHEST AP PORTABLE    CLINICAL HISTORY:  Chest Pain;    TECHNIQUE:  Single frontal view of the chest was performed.    COMPARISON:  None    FINDINGS:  Atherosclerotic changes.The lungs are clear, with normal appearance of pulmonary vasculature and no pleural effusion or pneumothorax.    The cardiac silhouette is prominent.  The hilar and mediastinal contours are unremarkable.    Bones are intact.  Thecal electrodes.  Senescent changes.                                 The EKG was ordered, reviewed, and independently interpreted by the ED provider.  Interpretation time: 21:23  Rate: 49 BPM  Rhythm: sinus bradycardia  Interpretation: Septal infarct. T wave abnormality, consider lateral ischemia. No STEMI.           The Emergency Provider reviewed the vital signs and test results, which are outlined above.     ED Discussion     12:56 AM: Reassessed pt at this time.  Discussed with pt all pertinent ED information and results. Discussed pt dx and plan of tx. Gave pt all f/u and return to the ED instructions. All questions and concerns were addressed at this time. Pt expresses understanding of information and instructions, and is comfortable with plan to discharge. Pt is stable for discharge.    I discussed with patient and/or family/caretaker that evaluation in the ED does not suggest any emergent or life threatening medical conditions requiring immediate intervention beyond what was provided in the ED, and I believe patient is safe for discharge.  Regardless, an unremarkable evaluation in the ED does not preclude the development or presence of a serious of life threatening condition. As such, patient was instructed to return immediately for any worsening or change in current symptoms.    1:12 AM  Patient is stable and nontoxic.  She has x2 negative enzymes with an  unchanged EKG.  Patient had a brief episode of chest pain lasting only a few moments today.  This was after starting a new pain medication.  Is resolved.  She denied any nausea vomiting or diaphoresis.  She did have recent MIs however.  X2 negative enzymes have been found here.  I have discussed case with the patient at length.  She will follow-up with the cardiologist was week for re-evaluation return for symptoms worsen in any way.  Patient seems reliable verbalized agreement understanding with all instructions.  She will return for symptoms worsen in any way.       Medical Decision Making:   Clinical Tests:   Lab Tests: Ordered and Reviewed  Radiological Study: Reviewed and Ordered  Medical Tests: Ordered and Reviewed           ED Medication(s):  Medications   nitroGLYCERIN 2% TD oint ointment 1 inch (1 inch Topical (Top) Given 7/23/22 2126)       New Prescriptions    No medications on file        Follow-up Information     Call  Domingo Martins MD.    Specialties: Interventional Cardiology, Cardiology  Contact information:  [id number]  [place] [address] [phone]                             Scribe Attestation:   Scribe #1: I performed the above scribed service and the documentation accurately describes the services I performed. I attest to the accuracy of the note.     Attending:   Physician Attestation Statement for Scribe #1: I, Jarett Lerner Jr., MD, personally performed the services described in this documentation, as scribed by Leroy Godinez, in my presence, and it is both accurate and complete.           Clinical Impression       ICD-10-CM ICD-9-CM   1. Chest pain  R07.9 786.50       Disposition:   Disposition: Discharged  Condition: Stable         Jarett Lerner Jr., MD  07/24/22 0114

## 2022-07-24 NOTE — DISCHARGE INSTRUCTIONS
Her troponin today is stable.  Please follow-up with cardiologist this week.  Call Monday for an appointment.  If her symptoms worsen in any way return, return immediately for re-evaluation.

## 2022-07-25 PROCEDURE — G0179 PR HOME HEALTH MD RECERTIFICATION: ICD-10-PCS | Mod: ,,, | Performed by: FAMILY MEDICINE

## 2022-07-25 PROCEDURE — G0179 MD RECERTIFICATION HHA PT: HCPCS | Mod: ,,, | Performed by: FAMILY MEDICINE

## 2022-07-27 ENCOUNTER — OFFICE VISIT (OUTPATIENT)
Dept: CARDIOLOGY | Facility: CLINIC | Age: 82
End: 2022-07-27
Payer: MEDICARE

## 2022-07-27 VITALS
HEIGHT: 61 IN | WEIGHT: 130.06 LBS | OXYGEN SATURATION: 98 % | DIASTOLIC BLOOD PRESSURE: 82 MMHG | HEART RATE: 68 BPM | SYSTOLIC BLOOD PRESSURE: 138 MMHG | BODY MASS INDEX: 24.55 KG/M2

## 2022-07-27 DIAGNOSIS — M62.830 BACK SPASM: ICD-10-CM

## 2022-07-27 DIAGNOSIS — M46.1 SACROILIITIS, NOT ELSEWHERE CLASSIFIED: ICD-10-CM

## 2022-07-27 DIAGNOSIS — E11.9 TYPE 2 DIABETES MELLITUS WITHOUT COMPLICATION, WITHOUT LONG-TERM CURRENT USE OF INSULIN: ICD-10-CM

## 2022-07-27 DIAGNOSIS — I10 ESSENTIAL HYPERTENSION: ICD-10-CM

## 2022-07-27 DIAGNOSIS — Q24.5 CORONARY-MYOCARDIAL BRIDGE: ICD-10-CM

## 2022-07-27 DIAGNOSIS — Z78.9 STATIN INTOLERANCE: ICD-10-CM

## 2022-07-27 DIAGNOSIS — Z87.898 HISTORY OF PREDIABETES: ICD-10-CM

## 2022-07-27 DIAGNOSIS — F41.9 ANXIETY: ICD-10-CM

## 2022-07-27 DIAGNOSIS — I51.81 STRESS-INDUCED CARDIOMYOPATHY: ICD-10-CM

## 2022-07-27 DIAGNOSIS — I42.8 NONISCHEMIC CARDIOMYOPATHY: Primary | ICD-10-CM

## 2022-07-27 PROCEDURE — 99214 PR OFFICE/OUTPT VISIT, EST, LEVL IV, 30-39 MIN: ICD-10-PCS | Mod: S$PBB,,, | Performed by: INTERNAL MEDICINE

## 2022-07-27 PROCEDURE — 99999 PR PBB SHADOW E&M-EST. PATIENT-LVL IV: ICD-10-PCS | Mod: PBBFAC,,, | Performed by: INTERNAL MEDICINE

## 2022-07-27 PROCEDURE — 99999 PR PBB SHADOW E&M-EST. PATIENT-LVL IV: CPT | Mod: PBBFAC,,, | Performed by: INTERNAL MEDICINE

## 2022-07-27 PROCEDURE — 99214 OFFICE O/P EST MOD 30 MIN: CPT | Mod: S$PBB,,, | Performed by: INTERNAL MEDICINE

## 2022-07-27 PROCEDURE — 99214 OFFICE O/P EST MOD 30 MIN: CPT | Mod: PBBFAC | Performed by: INTERNAL MEDICINE

## 2022-07-27 RX ORDER — FUROSEMIDE 20 MG/1
20 TABLET ORAL DAILY
Qty: 30 TABLET | Refills: 11 | Status: SHIPPED | OUTPATIENT
Start: 2022-07-27 | End: 2023-08-01

## 2022-07-27 NOTE — PROGRESS NOTES
Subjective:   Patient ID:  Leslie Wood is a 82 y.o. female who presents for evaluation of No chief complaint on file.    7.27.2022  Comes in for a follow up after recent visit to the ED  She states after she took the first dose of recently prescribed BELBUCA for back pain, she felt immediately dyspnea, resolved in the ED  She had no changes on ekg and two negative troponins with an elevated BNP of 200   She has been off the lasix, baseline BNP is normal   She does reports FUENTES NYHA 1-2 lately, but no orthopnea and no leg swelling     6.6.2022  Comes in for follow-up.  She was admitted twice to the hospital last month with chest pain.  Had 2 heart catheterization.  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  Her EF is low.  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  She denies any more chest pain.  Her memory loss has been worse as per prior visit with her daughter in the hospital.  No bleeding  Also seen Dr. Carl for 2nd opinion    Interval hx: 2/11/2021   Complains of occipital headache  No chest pain, no dyspnea  States she is doing rehab and sometimes her BP is 200   Not taking lasix. On imdur  Esr came back normal after last visit       Initial HPI: 10/1/2020 Patient 80-year-old, prediabetic, with history of hypertension, who recently had a stroke about 3 months ago and was hospitalized at [facility].  She does not recall what kind of cardiac workup she underwent while at that hospital.  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  Her left-sided weakness has improved since the stroke.  She is undergoing rehab.  And should undergo speech therapy soon as she states.  She reports compliance with her blood pressure medications, however she reports feeling dizzy when she stands up, she does not get dizzy or any other circumstances.  She states that she is not drinking enough water.  Also she complains of bilateral  lower extremity mild swelling that comes on and off when she takes or not take her amlodipine.  She denies any chest pain, dyspnea, orthopnea, PND, palpitations, syncope, presyncope, bleeding.                Past Medical History:   Diagnosis Date    Arthritis     Cataract     GERD (gastroesophageal reflux disease)     Heart attack 05/18/2022    Heart attack 05/23/2022    Hyperlipidemia     Hypertension     Stroke        Past Surgical History:   Procedure Laterality Date    ADENOIDECTOMY      ADRENAL GLAND SURGERY      APPENDECTOMY      BACK SURGERY      fusion l 4-5 s 1,2,3  fusion l 2-3    CORONARY ANGIOGRAPHY N/A 5/20/2022    Procedure: ANGIOGRAM, CORONARY ARTERY;  Surgeon: Domingo Martins MD;  Location: [facility] CATH LAB;  Service: Cardiology;  Laterality: N/A;    CORONARY ANGIOGRAPHY N/A 5/24/2022    Procedure: ANGIOGRAM, CORONARY ARTERY;  Surgeon: Domingo Martins MD;  Location: [facility] CATH LAB;  Service: Cardiology;  Laterality: N/A;    EYE SURGERY      HEMORRHOID SURGERY      HERNIA REPAIR      HYSTERECTOMY      indirect lumbar decompression      percutaneous placement of interspinous extension blocker    LEFT HEART CATHETERIZATION Left 5/20/2022    Procedure: CATHETERIZATION, HEART, LEFT;  Surgeon: Domingo Martins MD;  Location: [facility] CATH LAB;  Service: Cardiology;  Laterality: Left;    TONSILLECTOMY         Social History     Tobacco Use    Smoking status: Never Smoker    Smokeless tobacco: Never Used   Substance Use Topics    Alcohol use: No    Drug use: No       Family History   Problem Relation Age of Onset    Heart disease Mother     Hypertension Mother     Diabetes Mother     Hypertension Father     Kidney disease Father     Breast cancer Sister        Review of Systems   Constitutional: Negative for fever and malaise/fatigue.   HENT: Negative for sore throat.    Eyes: Negative for blurred vision.   Cardiovascular: Negative for chest pain, claudication, cyanosis, dyspnea  on exertion, irregular heartbeat, leg swelling, near-syncope, orthopnea, palpitations, paroxysmal nocturnal dyspnea and syncope.   Respiratory: Negative for cough, hemoptysis and shortness of breath.    Hematologic/Lymphatic: Negative for bleeding problem.   Skin: Negative for poor wound healing and rash.   Musculoskeletal: Negative for back pain and falls.   Gastrointestinal: Negative for abdominal pain.   Genitourinary: Negative for nocturia.   Neurological: Positive for headaches. Negative for light-headedness and loss of balance.   Psychiatric/Behavioral: Negative for altered mental status and substance abuse.       Current Outpatient Medications on File Prior to Visit   Medication Sig    aspirin (ECOTRIN) 81 MG EC tablet Take 81 mg by mouth once daily.    atorvastatin (LIPITOR) 80 MG tablet Take 1 tablet (80 mg total) by mouth every evening.    BELBUCA 75 mcg Film Take 1 strip by mouth 2 (two) times daily.    metoprolol succinate (TOPROL-XL) 50 MG 24 hr tablet Take 1 tablet (50 mg total) by mouth once daily.    NUCYNTA 50 mg Tab Take by mouth.    ramipriL (ALTACE) 10 MG capsule Take 10 mg by mouth once daily.    traMADoL (ULTRAM) 50 mg tablet Take by mouth.    clopidogreL (PLAVIX) 75 mg tablet Take 1 tablet (75 mg total) by mouth once daily.    [DISCONTINUED] carvediloL (COREG) 6.25 MG tablet Take 1 tablet (6.25 mg total) by mouth 2 (two) times daily. TAKE (1) TABLET BY MOUTH 2 TIMES A DAY WITH MEALS    [DISCONTINUED] cloNIDine (CATAPRES) 0.1 MG tablet Take 1 tablet (0.1 mg total) by mouth 2 (two) times daily. To take an extra dose if BP >160/90    [DISCONTINUED] diclofenac sodium (VOLTAREN) 1 % Gel Apply 2 g topically 3 (three) times daily.    [DISCONTINUED] isosorbide mononitrate (IMDUR) 30 MG 24 hr tablet TAKE 1 TABLET BY MOUTH TWICE A DAY    [DISCONTINUED] metoprolol tartrate (LOPRESSOR) 25 MG tablet Take 1 tablet (25 mg total) by mouth 3 (three) times daily.    [DISCONTINUED] nitroGLYCERIN  (NITROSTAT) 0.4 MG SL tablet Place 1 tablet (0.4 mg total) under the tongue every 5 (five) minutes as needed for Chest pain.    [DISCONTINUED] valsartan (DIOVAN) 160 MG tablet TAKE 1 TABLET BY MOUTH TWICE A DAY     No current facility-administered medications on file prior to visit.       Objective:   Objective:  Wt Readings from Last 3 Encounters:   07/27/22 59 kg (130 lb 1.1 oz)   06/09/22 57.2 kg (126 lb)   06/08/22 58.3 kg (128 lb 8.5 oz)     Temp Readings from Last 3 Encounters:   07/24/22 98.2 °F (36.8 °C)   06/08/22 98 °F (36.7 °C) (Temporal)   05/25/22 98.3 °F (36.8 °C) (Oral)     BP Readings from Last 3 Encounters:   07/27/22 138/82   07/24/22 (!) 157/72   06/09/22 128/78     Pulse Readings from Last 3 Encounters:   07/27/22 68   07/24/22 (!) 55   06/08/22 78       Physical Exam  Vitals reviewed.   Constitutional:       Appearance: She is well-developed.   HENT:      Head: Normocephalic and atraumatic.   Eyes:      General: No scleral icterus.     Conjunctiva/sclera: Conjunctivae normal.   Cardiovascular:      Rate and Rhythm: Normal rate and regular rhythm.      Pulses: Intact distal pulses.      Heart sounds: Normal heart sounds. No murmur heard.     Pulmonary:      Effort: No respiratory distress.      Breath sounds: No wheezing or rales.   Chest:      Chest wall: No tenderness.   Abdominal:      General: Bowel sounds are normal. There is no distension.      Palpations: Abdomen is soft.      Tenderness: There is no guarding.   Musculoskeletal:         General: Normal range of motion.      Cervical back: Normal range of motion and neck supple.   Skin:     General: Skin is warm.   Neurological:      Mental Status: She is alert and oriented to person, place, and time.         Lab Results   Component Value Date    CHOL 155 05/20/2022    CHOL 166 11/10/2021    CHOL 101 (L) 09/16/2020     Lab Results   Component Value Date    HDL 45 05/20/2022    HDL 41 11/10/2021    HDL 46 09/16/2020     Lab Results    Component Value Date    LDLCALC 98.0 05/20/2022    LDLCALC 102.0 11/10/2021    LDLCALC 37.0 (L) 09/16/2020     Lab Results   Component Value Date    TRIG 60 05/20/2022    TRIG 115 11/10/2021    TRIG 90 09/16/2020     Lab Results   Component Value Date    CHOLHDL 29.0 05/20/2022    CHOLHDL 24.7 11/10/2021    CHOLHDL 45.5 09/16/2020       Chemistry        Component Value Date/Time     07/23/2022 2111    K 4.0 07/23/2022 2111     07/23/2022 2111    CO2 24 07/23/2022 2111    BUN 23 07/23/2022 2111    CREATININE 0.7 07/23/2022 2111     07/23/2022 2111        Component Value Date/Time    CALCIUM 9.4 07/23/2022 2111    ALKPHOS 78 07/23/2022 2111    AST 40 07/23/2022 2111    ALT 39 07/23/2022 2111    BILITOT 0.4 07/23/2022 2111    ESTGFRAFRICA >60 07/23/2022 2111    EGFRNONAA >60 07/23/2022 2111          Lab Results   Component Value Date    TSH 0.764 12/06/2021     Lab Results   Component Value Date    INR 1.1 05/24/2022    INR 1.0 05/24/2022    INR 1.0 05/19/2022     Lab Results   Component Value Date    WBC 8.89 07/23/2022    HGB 13.2 07/23/2022    HCT 40.9 07/23/2022    MCV 92 07/23/2022     07/23/2022     BNP  @LABRCNTIP(BNP,BNPTRIAGEBLO)@  Estimated Creatinine Clearance: 49.4 mL/min (based on SCr of 0.7 mg/dL).     Imaging:  ======  Results for orders placed during the hospital encounter of 03/02/20   Echo Color Flow Doppler? Yes    Narrative · Mild concentric left ventricular hypertrophy.  · Normal left ventricular systolic function. The estimated ejection   fraction is 60%.  · Normal LV diastolic function.  · Normal right ventricular systolic function.  · Mild aortic regurgitation.  · Normal central venous pressure (3 mmHg).  · Trivial pericardial effusion.        No results found for this or any previous visit.  No results found for this or any previous visit.  Results for orders placed during the hospital encounter of 05/11/17   X-Ray Chest PA And Lateral    Narrative XR CHEST PA AND  LATERAL, 05/11/17 11:38:46    Clinical indication: Chest pain.    Findings:  Comparison with 05/10/2017.    Epidural leads within the dorsal thoracic spine.  Lower thoracic wedge compression fracture status post kyphoplasty.  Left upper quadrant surgical clips.    Heart size is normal.  Prominent left pericardial fat pad.    Aortic arch calcification.    Lung fields remain clear.    Impression      Stable chest x-ray.  No acute findings.      Electronically signed by: EMERY SAMAYOA MD  Date:     05/11/17  Time:    12:07      No results found for this or any previous visit.  No procedure found.    Diagnostic Results:  ECG: Reviewed  Marked sinus bradycardia with sinus arrhythmia   Abnormal ECG   When compared with ECG of 28-OCT-2020 16:09,   Criteria for Septal infarct are no longer Present   T wave inversion no longer evident in Anterior leads   Confirmed by MD RODERICK, BARB ([id number]) on 11/12/2020 9:25:42 PM       Results for orders placed during the hospital encounter of 05/24/22    Cardiac catheterization    Conclusion  · The estimated blood loss was none.  · There is severe mid LAD intramyocardial bridging improved with betablockers intra op  · There was no evidence of an acute atherothrombotic lesion    The procedure log was documented by Documenter: Te Waller and verified by Domingo Martins MD.    Date: 5/24/2022  Time: 9:49 AM    The ASCVD Risk score ([place] DC Jr., et al., 2013) failed to calculate for the following reasons:    The 2013 ASCVD risk score is only valid for ages 40 to 79    The patient has a prior MI or stroke diagnosis    Assessment and Plan:   Nonischemic cardiomyopathy  Comments:  ELEVATED BNP BUT EUVOLEMIC ON EXAM   Orders:  -     furosemide (LASIX) 20 MG tablet; Take 1 tablet (20 mg total) by mouth once daily.  Dispense: 30 tablet; Refill: 11    Essential hypertension    Stress-induced cardiomyopathy    Coronary-myocardial bridge    History of prediabetes    Back  spasm    Anxiety    Statin intolerance    Sacroiliitis, not elsewhere classified    Type 2 diabetes mellitus without complication, without long-term current use of insulin      Reviewed all tests and above medical conditions with patient in detail and formulated treatment plan.  Risk factor modification discussed.   Cardiac low salt diet discussed.  Maintaining healthy weight and weight loss goals were discussed in clinic.  Hypertension controlled, continue with beta-blocker, chest pain-free  On ramipril  cw toprol      Follow up in 6 months

## 2022-07-31 ENCOUNTER — EXTERNAL CHRONIC CARE MANAGEMENT (OUTPATIENT)
Dept: PRIMARY CARE CLINIC | Facility: CLINIC | Age: 82
End: 2022-07-31
Payer: MEDICARE

## 2022-07-31 PROCEDURE — 99490 CHRNC CARE MGMT STAFF 1ST 20: CPT | Mod: PBBFAC | Performed by: FAMILY MEDICINE

## 2022-07-31 PROCEDURE — 99439 PR CHRONIC CARE MGMT, EA ADDTL 20 MIN: ICD-10-PCS | Mod: S$PBB,,, | Performed by: FAMILY MEDICINE

## 2022-07-31 PROCEDURE — 99439 CHRNC CARE MGMT STAF EA ADDL: CPT | Mod: S$PBB,,, | Performed by: FAMILY MEDICINE

## 2022-07-31 PROCEDURE — 99490 CHRNC CARE MGMT STAFF 1ST 20: CPT | Mod: S$PBB,,, | Performed by: FAMILY MEDICINE

## 2022-07-31 PROCEDURE — 99490 PR CHRONIC CARE MGMT, 1ST 20 MIN: ICD-10-PCS | Mod: S$PBB,,, | Performed by: FAMILY MEDICINE

## 2022-07-31 PROCEDURE — 99439 CHRNC CARE MGMT STAF EA ADDL: CPT | Mod: PBBFAC | Performed by: FAMILY MEDICINE

## 2022-08-08 ENCOUNTER — EXTERNAL HOME HEALTH (OUTPATIENT)
Dept: HOME HEALTH SERVICES | Facility: HOSPITAL | Age: 82
End: 2022-08-08
Payer: MEDICARE

## 2022-08-31 ENCOUNTER — EXTERNAL CHRONIC CARE MANAGEMENT (OUTPATIENT)
Dept: PRIMARY CARE CLINIC | Facility: CLINIC | Age: 82
End: 2022-08-31
Payer: MEDICARE

## 2022-08-31 PROCEDURE — 99490 CHRNC CARE MGMT STAFF 1ST 20: CPT | Mod: PBBFAC,25 | Performed by: FAMILY MEDICINE

## 2022-08-31 PROCEDURE — 99490 PR CHRONIC CARE MGMT, 1ST 20 MIN: ICD-10-PCS | Mod: S$PBB,,, | Performed by: FAMILY MEDICINE

## 2022-08-31 PROCEDURE — 99439 CHRNC CARE MGMT STAF EA ADDL: CPT | Mod: PBBFAC,25,27 | Performed by: FAMILY MEDICINE

## 2022-08-31 PROCEDURE — 99439 CHRNC CARE MGMT STAF EA ADDL: CPT | Mod: S$PBB,,, | Performed by: FAMILY MEDICINE

## 2022-08-31 PROCEDURE — 99439 PR CHRONIC CARE MGMT, EA ADDTL 20 MIN: ICD-10-PCS | Mod: S$PBB,,, | Performed by: FAMILY MEDICINE

## 2022-08-31 PROCEDURE — 99490 CHRNC CARE MGMT STAFF 1ST 20: CPT | Mod: S$PBB,,, | Performed by: FAMILY MEDICINE

## 2022-09-19 ENCOUNTER — TELEPHONE (OUTPATIENT)
Dept: ORTHOPEDICS | Facility: CLINIC | Age: 82
End: 2022-09-19
Payer: MEDICARE

## 2022-09-19 NOTE — TELEPHONE ENCOUNTER
Called patient in regards to inbasket message. Spoke to Ariane (granddaughter) and scheduled appointment based on patient preference. Patient verified appointment time and date.

## 2022-09-20 ENCOUNTER — HOSPITAL ENCOUNTER (OUTPATIENT)
Dept: RADIOLOGY | Facility: HOSPITAL | Age: 82
Discharge: HOME | End: 2022-09-20
Attending: PHYSICIAN ASSISTANT
Payer: MEDICARE

## 2022-09-20 ENCOUNTER — OFFICE VISIT (OUTPATIENT)
Dept: ORTHOPEDICS | Facility: CLINIC | Age: 82
End: 2022-09-20
Payer: MEDICARE

## 2022-09-20 VITALS
HEIGHT: 61 IN | BODY MASS INDEX: 24.55 KG/M2 | DIASTOLIC BLOOD PRESSURE: 69 MMHG | SYSTOLIC BLOOD PRESSURE: 99 MMHG | WEIGHT: 130 LBS

## 2022-09-20 DIAGNOSIS — M25.461 EFFUSION OF BURSA OF RIGHT KNEE: ICD-10-CM

## 2022-09-20 DIAGNOSIS — M25.561 CHRONIC PAIN OF RIGHT KNEE: ICD-10-CM

## 2022-09-20 DIAGNOSIS — G89.29 CHRONIC PAIN OF RIGHT KNEE: ICD-10-CM

## 2022-09-20 DIAGNOSIS — M23.51 RECURRENT RIGHT KNEE INSTABILITY: ICD-10-CM

## 2022-09-20 DIAGNOSIS — M17.11 PRIMARY OSTEOARTHRITIS OF RIGHT KNEE: Primary | ICD-10-CM

## 2022-09-20 DIAGNOSIS — M17.11 PRIMARY OSTEOARTHRITIS OF RIGHT KNEE: ICD-10-CM

## 2022-09-20 PROCEDURE — 99213 OFFICE O/P EST LOW 20 MIN: CPT | Mod: S$PBB,25,, | Performed by: PHYSICIAN ASSISTANT

## 2022-09-20 PROCEDURE — 73562 X-RAY EXAM OF KNEE 3: CPT | Mod: TC,59,LT

## 2022-09-20 PROCEDURE — 73562 XR KNEE ORTHO RIGHT WITH FLEXION: ICD-10-PCS | Mod: 26,LT,, | Performed by: RADIOLOGY

## 2022-09-20 PROCEDURE — 99999 PR PBB SHADOW E&M-EST. PATIENT-LVL IV: CPT | Mod: PBBFAC,,, | Performed by: PHYSICIAN ASSISTANT

## 2022-09-20 PROCEDURE — 73562 X-RAY EXAM OF KNEE 3: CPT | Mod: 26,LT,, | Performed by: RADIOLOGY

## 2022-09-20 PROCEDURE — 20610 LARGE JOINT ASPIRATION/INJECTION: R KNEE: ICD-10-PCS | Mod: S$PBB,RT,, | Performed by: PHYSICIAN ASSISTANT

## 2022-09-20 PROCEDURE — 73564 X-RAY EXAM KNEE 4 OR MORE: CPT | Mod: 26,RT,, | Performed by: RADIOLOGY

## 2022-09-20 PROCEDURE — 20610 DRAIN/INJ JOINT/BURSA W/O US: CPT | Mod: S$PBB,RT,, | Performed by: PHYSICIAN ASSISTANT

## 2022-09-20 PROCEDURE — 20610 DRAIN/INJ JOINT/BURSA W/O US: CPT | Mod: PBBFAC,RT | Performed by: PHYSICIAN ASSISTANT

## 2022-09-20 PROCEDURE — 99214 OFFICE O/P EST MOD 30 MIN: CPT | Mod: PBBFAC,25 | Performed by: PHYSICIAN ASSISTANT

## 2022-09-20 PROCEDURE — 99213 PR OFFICE/OUTPT VISIT, EST, LEVL III, 20-29 MIN: ICD-10-PCS | Mod: S$PBB,25,, | Performed by: PHYSICIAN ASSISTANT

## 2022-09-20 PROCEDURE — 99999 PR PBB SHADOW E&M-EST. PATIENT-LVL IV: ICD-10-PCS | Mod: PBBFAC,,, | Performed by: PHYSICIAN ASSISTANT

## 2022-09-20 PROCEDURE — 73564 XR KNEE ORTHO RIGHT WITH FLEXION: ICD-10-PCS | Mod: 26,RT,, | Performed by: RADIOLOGY

## 2022-09-20 RX ORDER — TIZANIDINE 4 MG/1
4 TABLET ORAL DAILY
COMMUNITY
End: 2022-12-15 | Stop reason: ALTCHOICE

## 2022-09-20 RX ORDER — METHYLPREDNISOLONE ACETATE 80 MG/ML
80 INJECTION, SUSPENSION INTRA-ARTICULAR; INTRALESIONAL; INTRAMUSCULAR; SOFT TISSUE
Status: DISCONTINUED | OUTPATIENT
Start: 2022-09-20 | End: 2022-09-20 | Stop reason: HOSPADM

## 2022-09-20 RX ORDER — DULOXETIN HYDROCHLORIDE 30 MG/1
30 CAPSULE, DELAYED RELEASE ORAL DAILY
COMMUNITY

## 2022-09-20 RX ADMIN — METHYLPREDNISOLONE ACETATE 80 MG: 80 INJECTION, SUSPENSION INTRALESIONAL; INTRAMUSCULAR; INTRASYNOVIAL; SOFT TISSUE at 09:09

## 2022-09-20 NOTE — PROGRESS NOTES
Patient ID: Leslie Wood  YOB: 1940  MRN: [id number]    Chief Complaint: Pain of the Right Knee      Referred By: Dr. Sage Calderon    History of Present Illness: Leslie Wood is a 82 y.o. female   retired with a chief complaint of Pain of the Right Knee    Patient presents to the clinic today c/o 10/10 Right Knee pain 3 months s/p Zilretta on 6/9/2022. Her pain is located on the Anterolateral aspect of her Knee. She states that the injection gave her relief until last week. She states that her Knee has been dislocating. Her daughter is present today and notes possible Knee valgus in the patient. The patient is ambulating with a wheelchair today. She states that she normally ambulates with a brace and occasionally a cane.     HPI    Past Medical History:   Past Medical History:   Diagnosis Date    Arthritis     Cataract     GERD (gastroesophageal reflux disease)     Heart attack 05/18/2022    Heart attack 05/23/2022    Hyperlipidemia     Hypertension     Stroke      Past Surgical History:   Procedure Laterality Date    ADENOIDECTOMY      ADRENAL GLAND SURGERY      APPENDECTOMY      BACK SURGERY      fusion l 4-5 s 1,2,3  fusion l 2-3    CORONARY ANGIOGRAPHY N/A 5/20/2022    Procedure: ANGIOGRAM, CORONARY ARTERY;  Surgeon: Domingo Martins MD;  Location: [facility] CATH LAB;  Service: Cardiology;  Laterality: N/A;    CORONARY ANGIOGRAPHY N/A 5/24/2022    Procedure: ANGIOGRAM, CORONARY ARTERY;  Surgeon: Domingo Martins MD;  Location: [facility] CATH LAB;  Service: Cardiology;  Laterality: N/A;    EYE SURGERY      HEMORRHOID SURGERY      HERNIA REPAIR      HYSTERECTOMY      indirect lumbar decompression      percutaneous placement of interspinous extension blocker    LEFT HEART CATHETERIZATION Left 5/20/2022    Procedure: CATHETERIZATION, HEART, LEFT;  Surgeon: Domingo Martins MD;  Location: [facility] CATH LAB;  Service: Cardiology;  Laterality: Left;    TONSILLECTOMY       Family History   Problem  Relation Age of Onset    Heart disease Mother     Hypertension Mother     Diabetes Mother     Hypertension Father     Kidney disease Father     Breast cancer Sister      Social History     Socioeconomic History    Marital status:    Tobacco Use    Smoking status: Never    Smokeless tobacco: Never   Substance and Sexual Activity    Alcohol use: No    Drug use: No    Sexual activity: Not Currently     Medication List with Changes/Refills   Current Medications    ASPIRIN (ECOTRIN) 81 MG EC TABLET    Take 81 mg by mouth once daily.    ATORVASTATIN (LIPITOR) 80 MG TABLET    Take 1 tablet (80 mg total) by mouth every evening.    BELBUCA 75 MCG FILM    Take 1 strip by mouth 2 (two) times daily.    CLOPIDOGREL (PLAVIX) 75 MG TABLET    Take 1 tablet (75 mg total) by mouth once daily.    DULOXETINE (CYMBALTA) 30 MG CAPSULE    Take 30 mg by mouth once daily.    FUROSEMIDE (LASIX) 20 MG TABLET    Take 1 tablet (20 mg total) by mouth once daily.    METOPROLOL SUCCINATE (TOPROL-XL) 50 MG 24 HR TABLET    Take 1 tablet (50 mg total) by mouth once daily.    NUCYNTA 50 MG TAB    Take by mouth.    RAMIPRIL (ALTACE) 10 MG CAPSULE    Take 10 mg by mouth once daily.    TIZANIDINE (ZANAFLEX) 4 MG TABLET    Take 4 mg by mouth once daily.    TRAMADOL (ULTRAM) 50 MG TABLET    Take by mouth.     Review of patient's allergies indicates:   Allergen Reactions    Prazosin Other (See Comments)     dizziness    Amitriptyline     Hydrochlorothiazide      Causes muscle cramping    Lisinopril      hyperkalemia    Percocet [oxycodone-acetaminophen] Other (See Comments)     Seizures    Codeine Nausea Only and Rash     Review of Systems   Constitutional: Negative for chills and fever.   HENT:  Negative for sore throat.    Eyes:  Negative for pain.   Cardiovascular:  Negative for chest pain and leg swelling.   Respiratory:  Negative for cough and shortness of breath.    Skin:  Negative for itching and rash.   Musculoskeletal:  Positive for  arthritis, joint pain, joint swelling, muscle weakness, myalgias and stiffness.   Gastrointestinal:  Negative for abdominal pain, nausea and vomiting.   Genitourinary:  Negative for dysuria.   Neurological:  Negative for dizziness, numbness and paresthesias.     Physical Exam:   Body mass index is 24.56 kg/m².  Vitals:    22 0951   BP: 99/69      GENERAL: Well appearing, appropriate for stated age, no acute distress.  CARDIOVASCULAR: Pulses regular by peripheral palpation.  PULMONARY: Respirations are even and non-labored.  NEURO: Awake, alert, and oriented x 3.  PSYCH: Mood & affect are appropriate.  HEENT: Head is normocephalic and atraumatic.  General    Nursing note and vitals reviewed.          Right Knee Exam     Inspection   Effusion: present    Tenderness   The patient is tender to palpation of the lateral joint line, patella, condyle and lateral retinaculum.    Crepitus   The patient has crepitus of the patella.    Range of Motion   Extension:  0   Flexion:  100     Tests   Ligament Examination   Lachman: normal (-1 to 2mm)   PCL-Posterior Drawer: normal (0 to 2mm)     MCL - Valgus: abnormal  LCL - Varus: normal    Other   Sensation: normal    Left Knee Exam     Crepitus   The patient has crepitus of the patella.    Range of Motion   Extension:  0   Flexion:  120     Tests   Stability   Lachman: normal (-1 to 2mm)   PCL-Posterior Drawer: normal (0 to 2mm)  MCL - Valgus: normal (0 to 2mm)  LCL - Varus: normal (0 to 2mm)    Other   Sensation: normal    Muscle Strength   Right Lower Extremity   Hip Abduction: 5/5   Quadriceps:  4/5   Hamstrin/5   Left Lower Extremity   Hip Abduction: 5/5   Quadriceps:  5/5   Hamstrin/5     Vascular Exam     Right Pulses  Dorsalis Pedis:      2+  Posterior Tibial:      2+        Left Pulses  Dorsalis Pedis:      2+  Posterior Tibial:      2+      All compartments are soft and compressible. Calf soft non-tender. Intact EHL, FHL, gastroc soleus, and tibialis  anterior. Sensation intact to light touch in superficial peroneal, deep peroneal, tibial, sural, and saphenous nerve distributions. Foot warm and well perfused with capillary refill of less than 2 seconds and palpable pedal pulses.       Imaging:    X-ray Knee Ortho Right with Flexion  Narrative: EXAMINATION:  XR KNEE ORTHO RIGHT WITH FLEXION    CLINICAL HISTORY:  Unilateral primary osteoarthritis, right knee    TECHNIQUE:  AP standing as well as PA flexion standing and Merchant views of both knees were performed.  A lateral view of the right knee is also performed.    COMPARISON:  Prior radiographs    FINDINGS:  Bilateral multi compartment degenerative findings present including severe joint space loss within the left knee medial compartment and right knee lateral compartment.  Moderate to severe right knee medial compartment joint space loss.  Right knee suprapatellar joint effusion possible.  No acute fracture.  Impression: Similar degenerative findings of the knees.    Electronically signed by: Nguyễn Stewart MD  Date:    09/20/2022  Time:    11:02      Relevant imaging results reviewed and interpreted by me, discussed with the patient and / or family today.     Other Tests:     Patient Instructions   Assessment:  Leslie Wood is a  82 y.o. female   retired with a chief complaint of Pain of the Right Knee  Presents today for a recheck on continued right knee pain recent right knee zilretta injection given on 6/9/22.  Right knee OA- worse in the lateral compartment    Encounter Diagnoses   Name Primary?    Primary osteoarthritis of right knee Yes    Recurrent right knee instability     Chronic pain of right knee     Effusion of bursa of right knee       Plan:  Right knee CSI [birth year]  Right knee hinged knee brace  Right knee lateral  brace  Follow up in 3 months    Follow-up: 3 months or sooner if there are any problems between now and then.    Leave Review:   Google: Leave Google Review  Healthgrades:  Leave Healthgrades Review    After Hours Number: [phone]      Provider Note/Medical Decision Making:     I discussed worrisome and red flag signs and symptoms with the patient. The patient expressed understanding and agreed to alert me immediately or to go to the emergency room if they experience any of these.   Treatment plan was developed with input from the patient/family, and they expressed understanding and agreement with the plan. All questions were answered today.    Disclaimer: This note was prepared using a voice recognition system and is likely to have sound alike errors within the text.

## 2022-09-20 NOTE — PROCEDURES
Large Joint Aspiration/Injection: R knee    Date/Time: 9/20/2022 9:30 AM  Performed by: Olivia Garcia PA-C  Authorized by: Olivia Garcia PA-C     Consent Done?:  Yes (Verbal)  Indications:  Joint swelling and pain  Site marked: the procedure site was marked    Timeout: prior to procedure the correct patient, procedure, and site was verified    Prep: patient was prepped and draped in usual sterile fashion      Local anesthesia used?: Yes    Anesthesia:  Local infiltration  Local anesthetic:  Bupivacaine 0.5% without epinephrine, lidocaine 1% without epinephrine, topical anesthetic and lidocaine spray    Details:  Needle Size:  22 G  Approach:  Superior  Location:  Knee  Site:  R knee  Medications:  80 mg methylPREDNISolone acetate 80 mg/mL  Patient tolerance:  Patient tolerated the procedure well with no immediate complications     2cc 1% lidocaine plain, 2cc 0.5% marcaine plain, 0.5cc 80mg methylprednisolone    Procedure Note:  We discussed the risk and benefits of injections, including pain, infection, bleeding, damage to adjacent structures, risk of reaction to injection. We discussed the steroid/cortisone injections will not heal the problem but mat help decrease inflammation and help with symptoms. We discussed the risk of repeated injections. The patient expressed understanding and wanted to proceed with the injection. We performed a timeout to verify the proper patient, proper procedure, and the proper site. The injection site was prepared in a sterile fashion. The patient tolerated it well and there were no complication. We did discuss with the patient that steroid injections can cause some increase in blood sugar and blood pressure for up to a week after the injection.

## 2022-09-30 ENCOUNTER — EXTERNAL CHRONIC CARE MANAGEMENT (OUTPATIENT)
Dept: PRIMARY CARE CLINIC | Facility: CLINIC | Age: 82
End: 2022-09-30
Payer: MEDICARE

## 2022-09-30 PROCEDURE — 99490 CHRNC CARE MGMT STAFF 1ST 20: CPT | Mod: PBBFAC | Performed by: FAMILY MEDICINE

## 2022-09-30 PROCEDURE — 99490 PR CHRONIC CARE MGMT, 1ST 20 MIN: ICD-10-PCS | Mod: S$PBB,,, | Performed by: FAMILY MEDICINE

## 2022-09-30 PROCEDURE — 99490 CHRNC CARE MGMT STAFF 1ST 20: CPT | Mod: S$PBB,,, | Performed by: FAMILY MEDICINE

## 2022-10-12 ENCOUNTER — TELEPHONE (OUTPATIENT)
Dept: ORTHOPEDICS | Facility: CLINIC | Age: 82
End: 2022-10-12
Payer: MEDICARE

## 2022-10-12 NOTE — TELEPHONE ENCOUNTER
----- Message from Sudha Hill sent at 10/12/2022  2:15 PM CDT -----  Contact: SYLVIA [phone]  Pt calling to speak with the nurse to see if the doctor able to fir her in tomorrow for her back because she be there to  a brace? Please call to advise.

## 2022-10-19 ENCOUNTER — OFFICE VISIT (OUTPATIENT)
Dept: PAIN MEDICINE | Facility: CLINIC | Age: 82
End: 2022-10-19
Payer: MEDICARE

## 2022-10-19 VITALS
SYSTOLIC BLOOD PRESSURE: 142 MMHG | BODY MASS INDEX: 24.97 KG/M2 | DIASTOLIC BLOOD PRESSURE: 70 MMHG | WEIGHT: 132.25 LBS | HEIGHT: 61 IN | RESPIRATION RATE: 17 BRPM | HEART RATE: 62 BPM

## 2022-10-19 DIAGNOSIS — G89.4 CHRONIC PAIN DISORDER: ICD-10-CM

## 2022-10-19 DIAGNOSIS — M96.1 LUMBAR POST-LAMINECTOMY SYNDROME: Primary | ICD-10-CM

## 2022-10-19 PROCEDURE — 99204 OFFICE O/P NEW MOD 45 MIN: CPT | Mod: S$PBB,,, | Performed by: PHYSICAL MEDICINE & REHABILITATION

## 2022-10-19 PROCEDURE — 99999 PR PBB SHADOW E&M-EST. PATIENT-LVL III: CPT | Mod: PBBFAC,,, | Performed by: PHYSICAL MEDICINE & REHABILITATION

## 2022-10-19 PROCEDURE — 99213 OFFICE O/P EST LOW 20 MIN: CPT | Mod: PBBFAC | Performed by: PHYSICAL MEDICINE & REHABILITATION

## 2022-10-19 PROCEDURE — 99999 PR PBB SHADOW E&M-EST. PATIENT-LVL III: ICD-10-PCS | Mod: PBBFAC,,, | Performed by: PHYSICAL MEDICINE & REHABILITATION

## 2022-10-19 PROCEDURE — 99204 PR OFFICE/OUTPT VISIT, NEW, LEVL IV, 45-59 MIN: ICD-10-PCS | Mod: S$PBB,,, | Performed by: PHYSICAL MEDICINE & REHABILITATION

## 2022-10-19 NOTE — PROGRESS NOTES
New Patient Chronic Pain Note (Initial Visit)    Referring Physician: Janet Jewell    PCP: Angeles Carson MD    Chief Complaint:   Chief Complaint   Patient presents with    Low-back Pain        SUBJECTIVE:    Leslie Wood is a 82 y.o. female who presents to the clinic for the evaluation of chronic back pain.  She is self referred.  She is currently seeing [facility].  The pain started several years ago and symptoms have been unchanged.  She has undergone multiple different spine surgeries, spinal cord stimulator placement, multiple different interventional pain procedures.  The pain is located in the lumbosacral area and radiates to the bilateral lower extremities.  The pain is described as  sharp, stabbing, aching  and is rated as 8/10. The pain is rated with a score of  7/10 on the BEST day and a score of 10/10 on the WORST day.  Symptoms interfere with daily activity. The pain is exacerbated by movement.  The pain is mitigated by nothing.     Patient denies night fever/night sweats, urinary incontinence, bowel incontinence, significant weight loss, significant motor weakness, and loss of sensations.    Pain Disability Index Review:   No flowsheet data found.    Non-Pharmacologic Treatments:  Physical Therapy/Home Exercise: yes  Ice/Heat:yes  TENS: no  Acupuncture: no  Massage: yes  Chiropractic: no    Other: no      Pain Medications:  - Opioids:  Nucynta, belbuca, tramadol, Norco  - Adjuvant Medications:  Cymbalta, Zanaflex, diclofenac gel  - Anti-Coagulants: Aspirin and Plavix ( Clopidogrel)     report:  Reviewed and consistent with medication use as prescribed.      Pain Procedures:   -multiple different pain procedures including JAMESON, lumbar MBB/RFA, and spinal cord stimulation      Imaging:   X-ray right knee 09/20/2022:   Bilateral multi compartment degenerative findings present including severe joint space loss within the left knee  medial compartment and right knee lateral compartment.  Moderate to severe right knee medial compartment joint space loss.  Right knee suprapatellar joint effusion possible.  No acute fracture.    X-ray right hip 02/20/2022:   Multiple radiographic views  were obtained.     No acute fracture.  Degenerative joint disease identified.  Decreased bone mineral density.  Device and postsurgical changes in the right hemipelvis and spine.    X-ray lumbar spine 02/20/2022:  Multiple radiographic views  were obtained.     Degenerative joint disease of the spine.  Hernia repair noted.  Device with wires identified.  Degenerative joint disease of the spine.  Postsurgical changes with hyperdense region between the disc space of L3-L4.  Degenerative joint disease identified.  Mild retrolisthesis of to L2 with respect to L1 likely on a degenerative basis.    Past Medical History:   Diagnosis Date    Arthritis     Cataract     GERD (gastroesophageal reflux disease)     Heart attack 05/18/2022    Heart attack 05/23/2022    Hyperlipidemia     Hypertension     Stroke      Past Surgical History:   Procedure Laterality Date    ADENOIDECTOMY      ADRENAL GLAND SURGERY      APPENDECTOMY      BACK SURGERY      fusion l 4-5 s 1,2,3  fusion l 2-3    CORONARY ANGIOGRAPHY N/A 5/20/2022    Procedure: ANGIOGRAM, CORONARY ARTERY;  Surgeon: Domingo Martins MD;  Location: [facility] CATH LAB;  Service: Cardiology;  Laterality: N/A;    CORONARY ANGIOGRAPHY N/A 5/24/2022    Procedure: ANGIOGRAM, CORONARY ARTERY;  Surgeon: Domingo Martins MD;  Location: [facility] CATH LAB;  Service: Cardiology;  Laterality: N/A;    EYE SURGERY      HEMORRHOID SURGERY      HERNIA REPAIR      HYSTERECTOMY      indirect lumbar decompression      percutaneous placement of interspinous extension blocker    LEFT HEART CATHETERIZATION Left 5/20/2022    Procedure: CATHETERIZATION, HEART, LEFT;  Surgeon: Domingo Martins MD;  Location: [facility] CATH LAB;  Service: Cardiology;   Laterality: Left;    TONSILLECTOMY       Social History     Socioeconomic History    Marital status:    Tobacco Use    Smoking status: Never    Smokeless tobacco: Never   Substance and Sexual Activity    Alcohol use: No    Drug use: No    Sexual activity: Not Currently     Family History   Problem Relation Age of Onset    Heart disease Mother     Hypertension Mother     Diabetes Mother     Hypertension Father     Kidney disease Father     Breast cancer Sister        Review of patient's allergies indicates:   Allergen Reactions    Prazosin Other (See Comments)     dizziness    Amitriptyline     Hydrochlorothiazide      Causes muscle cramping    Lisinopril      hyperkalemia    Percocet [oxycodone-acetaminophen] Other (See Comments)     Seizures    Belbuca [buprenorphine hcl] Nausea And Vomiting and Other (See Comments)     Black out     Codeine Nausea Only and Rash       Current Outpatient Medications   Medication Sig    aspirin (ECOTRIN) 81 MG EC tablet Take 81 mg by mouth once daily.    DULoxetine (CYMBALTA) 30 MG capsule Take 30 mg by mouth once daily.    furosemide (LASIX) 20 MG tablet Take 1 tablet (20 mg total) by mouth once daily.    metoprolol succinate (TOPROL-XL) 50 MG 24 hr tablet Take 1 tablet (50 mg total) by mouth once daily.    NUCYNTA 50 mg Tab Take by mouth.    ramipriL (ALTACE) 10 MG capsule Take 10 mg by mouth once daily.    tiZANidine (ZANAFLEX) 4 MG tablet Take 4 mg by mouth once daily.    traMADoL (ULTRAM) 50 mg tablet Take by mouth.    atorvastatin (LIPITOR) 80 MG tablet Take 1 tablet (80 mg total) by mouth every evening.    clopidogreL (PLAVIX) 75 mg tablet Take 1 tablet (75 mg total) by mouth once daily.     No current facility-administered medications for this visit.       Review of Systems     GENERAL:  No weight loss, malaise or fevers.  HEENT:   No recent changes in vision or hearing  NECK:  Negative for lumps, no difficulty with swallowing.  RESPIRATORY:  Negative for cough,  "wheezing or shortness of breath, patient denies any recent URI.  CARDIOVASCULAR:  Negative for chest pain, leg swelling or palpitations.  GI:  Negative for abdominal discomfort, blood in stools or black stools or change in bowel habits.  MUSCULOSKELETAL:  See HPI.  SKIN:  Negative for lesions, rash, and itching.  PSYCH:  No mood disorder or recent psychosocial stressors.  Patients sleep is not disturbed secondary to pain.  HEMATOLOGY/LYMPHOLOGY:  Negative for prolonged bleeding, bruising easily or swollen nodes.  Patient is currently taking anti-coagulants-aspirin and Plavix  NEURO:   No history of headaches, syncope, paralysis, seizures or tremors.  All other reviewed and negative other than HPI.    OBJECTIVE:    BP (!) 142/70   Pulse 62   Resp 17   Ht 5' 1" (1.549 m)   Wt 60 kg (132 lb 4.4 oz)   BMI 24.99 kg/m²         Physical Exam    GENERAL: Well appearing, in no acute distress, alert and oriented x3.  PSYCH:  Mood and affect appropriate.  SKIN: Skin color, texture, turgor normal, no rashes or lesions.  HEAD/FACE:  Normocephalic, atraumatic. Cranial nerves grossly intact.  CV: RRR with palpation of the radial artery.  PULM: No evidence of respiratory difficulty, symmetric chest rise.  GI:  Soft and non-tender.  BACK:  Surgical scarring.  Straight leg raising in the sitting and supine positions is equivocal to radicular pain.  Moderate pain to palpation over the facet joints of the lumbar spine o and lumbar paraspinals.  Limited range of motion secondary to pain reproduction.  EXTREMITIES: Peripheral joint ROM is full and pain free without obvious instability or laxity in all four extremities. No deformities, edema, or skin discoloration. Good capillary refill.  MUSCULOSKELETAL:  Hip and knee provocative maneuvers are unremarkable.  There is mild-to-moderate pain with palpation over the sacroiliac joints bilaterally.  FABERs test is equivocal.  FADIRs test is equivocal.   Bilateral upper and lower extremity " strength is normal and symmetric.  No atrophy or tone abnormalities are noted.  NEURO: Bilateral upper and lower extremity coordination and muscle stretch reflexes are physiologic and symmetric.  Plantar response are downgoing. No clonus.  No loss of sensation is noted.  GAIT:  Wheelchair.      LABS:  Lab Results   Component Value Date    WBC 8.89 07/23/2022    HGB 13.2 07/23/2022    HCT 40.9 07/23/2022    MCV 92 07/23/2022     07/23/2022       CMP  Sodium   Date Value Ref Range Status   07/23/2022 142 136 - 145 mmol/L Final     Potassium   Date Value Ref Range Status   07/23/2022 4.0 3.5 - 5.1 mmol/L Final     Chloride   Date Value Ref Range Status   07/23/2022 110 95 - 110 mmol/L Final     CO2   Date Value Ref Range Status   07/23/2022 24 23 - 29 mmol/L Final     Glucose   Date Value Ref Range Status   07/23/2022 107 70 - 110 mg/dL Final     BUN   Date Value Ref Range Status   07/23/2022 23 8 - 23 mg/dL Final     Creatinine   Date Value Ref Range Status   07/23/2022 0.7 0.5 - 1.4 mg/dL Final     Calcium   Date Value Ref Range Status   07/23/2022 9.4 8.7 - 10.5 mg/dL Final     Total Protein   Date Value Ref Range Status   07/23/2022 6.7 6.0 - 8.4 g/dL Final     Albumin   Date Value Ref Range Status   07/23/2022 3.5 3.5 - 5.2 g/dL Final     Total Bilirubin   Date Value Ref Range Status   07/23/2022 0.4 0.1 - 1.0 mg/dL Final     Comment:     For infants and newborns, interpretation of results should be based  on gestational age, weight and in agreement with clinical  observations.    Premature Infant recommended reference ranges:  Up to 24 hours.............<8.0 mg/dL  Up to 48 hours............<12.0 mg/dL  3-5 days..................<15.0 mg/dL  6-29 days.................<15.0 mg/dL       Alkaline Phosphatase   Date Value Ref Range Status   07/23/2022 78 55 - 135 U/L Final     AST   Date Value Ref Range Status   07/23/2022 40 10 - 40 U/L Final     ALT   Date Value Ref Range Status   07/23/2022 39 10 - 44 U/L  Final     Anion Gap   Date Value Ref Range Status   07/23/2022 8 8 - 16 mmol/L Final     eGFR if    Date Value Ref Range Status   07/23/2022 >60 >60 mL/min/1.73 m^2 Final     eGFR if non    Date Value Ref Range Status   07/23/2022 >60 >60 mL/min/1.73 m^2 Final     Comment:     Calculation used to obtain the estimated glomerular filtration  rate (eGFR) is the CKD-EPI equation.          Lab Results   Component Value Date    HGBA1C 5.2 05/20/2022             ASSESSMENT: 82 y.o. year old female with chronic lower back and leg pain, consistent with     1. Lumbar post-laminectomy syndrome  X-Ray Thoracolumbar Spine AP Lateral      2. Chronic pain disorder              PLAN:   - Interventions:  None at this time, patient may benefit from spinal cord stimulator swap to Nevro device .     - Anticoagulation use: Patient is taking aspirin and Plavix      - Medications: I have stressed the importance of physical activity and a home exercise plan to help with pain and improve health. and Patient can continue with medications for now since they are providing benefits, using them appropriately, and without side effects.  Advised the patient that on up taking over her opioid based medications at this time as she is currently under pain contract with outside pain clinic.    - Therapy:  Advised to continue with activities as tolerated    - Labs:  Reviewed    - Imaging: Reviewed available imaging with patient and answered any questions they had regarding study.  Order thoracolumbar x-ray for further evaluation    - Consults/Referrals:  None at this time    - Records:  Reviewed/Obtain old records from outside physicians and imaging    - Follow up visit: return to clinic as needed    - Counseled patient regarding the importance of activity modification and physical therapy    - This condition does not require this patient to take time off of work, and the primary goal of our Pain Management services is to  improve the patient's functional capacity.    - Patient Questions: Answered all of the patient's questions regarding diagnosis, therapy, and treatment        The above plan and management options were discussed at length with patient. Patient is in agreement with the above and verbalized understanding.    I discussed the goals of interventional chronic pain management with the patient on today's visit.  I explained the utility of injections for diagnostic and therapeutic purposes.  We discussed a multimodal approach to pain including treating the patient's given worst pain at any given time.  We will use a systematic approach to addressing pain.  We will also adopt a multimodal approach that includes injections, adjuvant medications, physical therapy, at times psychiatry.  There may be a limited role for opioid use intermittently in the treatment of pain, more particularly for acute pain although no one approach can be used as a sole treatment modality.    I emphasized the importance of regular exercise, core strengthening and stretching, diet and weight loss as a cornerstone of long-term pain management.      Romulo Augustine MD  Interventional Pain Management  [facility]    Disclaimer:  This note was prepared using voice recognition system and is likely to have sound alike errors that may have been overlooked even after proof reading.  Please call me with any questions

## 2022-10-21 ENCOUNTER — HOSPITAL ENCOUNTER (OUTPATIENT)
Dept: RADIOLOGY | Facility: HOSPITAL | Age: 82
Discharge: HOME | End: 2022-10-21
Attending: PHYSICAL MEDICINE & REHABILITATION
Payer: MEDICARE

## 2022-10-21 DIAGNOSIS — M96.1 LUMBAR POST-LAMINECTOMY SYNDROME: ICD-10-CM

## 2022-10-21 PROCEDURE — 72080 X-RAY EXAM THORACOLMB 2/> VW: CPT | Mod: 26,,, | Performed by: RADIOLOGY

## 2022-10-21 PROCEDURE — 72080 XR THORACOLUMBAR SPINE AP LATERAL: ICD-10-PCS | Mod: 26,,, | Performed by: RADIOLOGY

## 2022-10-21 PROCEDURE — 72080 X-RAY EXAM THORACOLMB 2/> VW: CPT | Mod: TC

## 2022-10-31 ENCOUNTER — TELEPHONE (OUTPATIENT)
Dept: ADMINISTRATIVE | Facility: HOSPITAL | Age: 82
End: 2022-10-31
Payer: MEDICARE

## 2022-10-31 ENCOUNTER — EXTERNAL CHRONIC CARE MANAGEMENT (OUTPATIENT)
Dept: PRIMARY CARE CLINIC | Facility: CLINIC | Age: 82
End: 2022-10-31
Payer: MEDICARE

## 2022-10-31 PROCEDURE — 99490 CHRNC CARE MGMT STAFF 1ST 20: CPT | Mod: PBBFAC | Performed by: FAMILY MEDICINE

## 2022-10-31 PROCEDURE — 99490 PR CHRONIC CARE MGMT, 1ST 20 MIN: ICD-10-PCS | Mod: S$PBB,,, | Performed by: FAMILY MEDICINE

## 2022-10-31 PROCEDURE — 99490 CHRNC CARE MGMT STAFF 1ST 20: CPT | Mod: S$PBB,,, | Performed by: FAMILY MEDICINE

## 2022-11-09 PROCEDURE — 99285 EMERGENCY DEPT VISIT HI MDM: CPT | Mod: 25,27

## 2022-11-09 PROCEDURE — 96374 THER/PROPH/DIAG INJ IV PUSH: CPT

## 2022-11-10 ENCOUNTER — HOSPITAL ENCOUNTER (INPATIENT)
Facility: HOSPITAL | Age: 82
LOS: 4 days | Discharge: NURSING, REHAB OR LONG-TERM CARE | DRG: 565 | End: 2022-11-15
Attending: EMERGENCY MEDICINE | Admitting: FAMILY MEDICINE
Payer: MEDICARE

## 2022-11-10 DIAGNOSIS — R07.9 CHEST PAIN: ICD-10-CM

## 2022-11-10 DIAGNOSIS — M25.461 EFFUSION OF RIGHT KNEE: Primary | ICD-10-CM

## 2022-11-10 DIAGNOSIS — S60.459A WOOD SPLINTER UNDER FINGERNAIL: ICD-10-CM

## 2022-11-10 DIAGNOSIS — R52 INTRACTABLE PAIN: ICD-10-CM

## 2022-11-10 DIAGNOSIS — M25.561 RIGHT KNEE PAIN: ICD-10-CM

## 2022-11-10 DIAGNOSIS — M25.061 HEMARTHROSIS OF RIGHT KNEE: ICD-10-CM
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
## 2022-11-10 LAB
ALBUMIN SERPL BCP-MCNC: 3.8 G/DL (ref 3.5–5.2)
ALP SERPL-CCNC: 92 U/L (ref 55–135)
ALT SERPL W/O P-5'-P-CCNC: 53 U/L (ref 10–44)
ANION GAP SERPL CALC-SCNC: 12 MMOL/L (ref 8–16)
AST SERPL-CCNC: 39 U/L (ref 10–40)
BASOPHILS # BLD AUTO: 0.03 K/UL (ref 0–0.2)
BASOPHILS NFR BLD: 0.2 % (ref 0–1.9)
BILIRUB SERPL-MCNC: 1.1 MG/DL (ref 0.1–1)
BUN SERPL-MCNC: 17 MG/DL (ref 8–23)
CALCIUM SERPL-MCNC: 9.8 MG/DL (ref 8.7–10.5)
CHLORIDE SERPL-SCNC: 108 MMOL/L (ref 95–110)
CO2 SERPL-SCNC: 22 MMOL/L (ref 23–29)
CREAT SERPL-MCNC: 0.8 MG/DL (ref 0.5–1.4)
DIFFERENTIAL METHOD: ABNORMAL
EOSINOPHIL # BLD AUTO: 0 K/UL (ref 0–0.5)
EOSINOPHIL NFR BLD: 0.1 % (ref 0–8)
ERYTHROCYTE [DISTWIDTH] IN BLOOD BY AUTOMATED COUNT: 14.3 % (ref 11.5–14.5)
EST. GFR  (NO RACE VARIABLE): >60 ML/MIN/1.73 M^2
GLUCOSE SERPL-MCNC: 174 MG/DL (ref 70–110)
HCT VFR BLD AUTO: 39 % (ref 37–48.5)
HGB BLD-MCNC: 13.1 G/DL (ref 12–16)
IMM GRANULOCYTES # BLD AUTO: 0.04 K/UL (ref 0–0.04)
IMM GRANULOCYTES NFR BLD AUTO: 0.3 % (ref 0–0.5)
LYMPHOCYTES # BLD AUTO: 1.4 K/UL (ref 1–4.8)
LYMPHOCYTES NFR BLD: 11.5 % (ref 18–48)
MCH RBC QN AUTO: 29.4 PG (ref 27–31)
MCHC RBC AUTO-ENTMCNC: 33.6 G/DL (ref 32–36)
MCV RBC AUTO: 88 FL (ref 82–98)
MONOCYTES # BLD AUTO: 1.5 K/UL (ref 0.3–1)
MONOCYTES NFR BLD: 12 % (ref 4–15)
NEUTROPHILS # BLD AUTO: 9.4 K/UL (ref 1.8–7.7)
NEUTROPHILS NFR BLD: 75.9 % (ref 38–73)
NRBC BLD-RTO: 0 /100 WBC
PLATELET # BLD AUTO: 224 K/UL (ref 150–450)
PMV BLD AUTO: 9.7 FL (ref 9.2–12.9)
POTASSIUM SERPL-SCNC: 4.2 MMOL/L (ref 3.5–5.1)
PROT SERPL-MCNC: 6.5 G/DL (ref 6–8.4)
RBC # BLD AUTO: 4.45 M/UL (ref 4–5.4)
SODIUM SERPL-SCNC: 142 MMOL/L (ref 136–145)
WBC # BLD AUTO: 12.37 K/UL (ref 3.9–12.7)

## 2022-11-10 PROCEDURE — 63600175 PHARM REV CODE 636 W HCPCS: Performed by: FAMILY MEDICINE

## 2022-11-10 PROCEDURE — 99283 EMERGENCY DEPT VISIT LOW MDM: CPT | Mod: 25,,, | Performed by: PHYSICIAN ASSISTANT

## 2022-11-10 PROCEDURE — 25000003 PHARM REV CODE 250: Performed by: FAMILY MEDICINE

## 2022-11-10 PROCEDURE — G0378 HOSPITAL OBSERVATION PER HR: HCPCS

## 2022-11-10 PROCEDURE — 20610 ARTHROCENTESIS: ICD-10-PCS | Mod: RT,,, | Performed by: PHYSICIAN ASSISTANT

## 2022-11-10 PROCEDURE — 97166 OT EVAL MOD COMPLEX 45 MIN: CPT

## 2022-11-10 PROCEDURE — 85025 COMPLETE CBC W/AUTO DIFF WBC: CPT | Performed by: EMERGENCY MEDICINE

## 2022-11-10 PROCEDURE — 97530 THERAPEUTIC ACTIVITIES: CPT

## 2022-11-10 PROCEDURE — 63600175 PHARM REV CODE 636 W HCPCS: Performed by: EMERGENCY MEDICINE

## 2022-11-10 PROCEDURE — 97163 PT EVAL HIGH COMPLEX 45 MIN: CPT

## 2022-11-10 PROCEDURE — 99283 PR EMERGENCY DEPT VISIT,LEVEL III: ICD-10-PCS | Mod: 25,,, | Performed by: PHYSICIAN ASSISTANT

## 2022-11-10 PROCEDURE — 20610 DRAIN/INJ JOINT/BURSA W/O US: CPT | Mod: RT,,, | Performed by: PHYSICIAN ASSISTANT

## 2022-11-10 PROCEDURE — 80053 COMPREHEN METABOLIC PANEL: CPT | Performed by: EMERGENCY MEDICINE

## 2022-11-10 PROCEDURE — 25000003 PHARM REV CODE 250: Performed by: EMERGENCY MEDICINE

## 2022-11-10 PROCEDURE — 25000003 PHARM REV CODE 250: Performed by: STUDENT IN AN ORGANIZED HEALTH CARE EDUCATION/TRAINING PROGRAM

## 2022-11-10 RX ORDER — MORPHINE SULFATE 4 MG/ML
4 INJECTION, SOLUTION INTRAMUSCULAR; INTRAVENOUS
Status: COMPLETED | OUTPATIENT
Start: 2022-11-10 | End: 2022-11-10

## 2022-11-10 RX ORDER — ONDANSETRON 4 MG/1
4 TABLET, FILM COATED ORAL EVERY 6 HOURS PRN
Status: DISCONTINUED | OUTPATIENT
Start: 2022-11-10 | End: 2022-11-15 | Stop reason: HOSPADM

## 2022-11-10 RX ORDER — NALOXONE HYDROCHLORIDE 1 MG/ML
0.02 INJECTION INTRAMUSCULAR; INTRAVENOUS; SUBCUTANEOUS
Status: DISCONTINUED | OUTPATIENT
Start: 2022-11-10 | End: 2022-11-15 | Stop reason: HOSPADM

## 2022-11-10 RX ORDER — GLUCAGON 1 MG
1 KIT INJECTION
Status: DISCONTINUED | OUTPATIENT
Start: 2022-11-10 | End: 2022-11-15 | Stop reason: HOSPADM

## 2022-11-10 RX ORDER — ACETAMINOPHEN 500 MG
500 TABLET ORAL EVERY 6 HOURS PRN
Qty: 14 TABLET | Refills: 0 | Status: SHIPPED | OUTPATIENT
Start: 2022-11-10 | End: 2022-11-14

## 2022-11-10 RX ORDER — MORPHINE SULFATE 4 MG/ML
4 INJECTION, SOLUTION INTRAMUSCULAR; INTRAVENOUS EVERY 6 HOURS PRN
Status: DISCONTINUED | OUTPATIENT
Start: 2022-11-10 | End: 2022-11-15 | Stop reason: HOSPADM

## 2022-11-10 RX ORDER — ACETAMINOPHEN 325 MG/1
650 TABLET ORAL EVERY 6 HOURS PRN
Status: DISCONTINUED | OUTPATIENT
Start: 2022-11-10 | End: 2022-11-15 | Stop reason: HOSPADM

## 2022-11-10 RX ORDER — SODIUM CHLORIDE 0.9 % (FLUSH) 0.9 %
10 SYRINGE (ML) INJECTION EVERY 12 HOURS PRN
Status: DISCONTINUED | OUTPATIENT
Start: 2022-11-10 | End: 2022-11-15 | Stop reason: HOSPADM

## 2022-11-10 RX ORDER — HYDROCODONE BITARTRATE AND ACETAMINOPHEN 7.5; 325 MG/1; MG/1
1 TABLET ORAL EVERY 6 HOURS PRN
Status: DISCONTINUED | OUTPATIENT
Start: 2022-11-10 | End: 2022-11-15 | Stop reason: HOSPADM

## 2022-11-10 RX ORDER — AMLODIPINE BESYLATE 5 MG/1
5 TABLET ORAL DAILY
Status: DISCONTINUED | OUTPATIENT
Start: 2022-11-10 | End: 2022-11-15 | Stop reason: HOSPADM

## 2022-11-10 RX ORDER — KETOROLAC TROMETHAMINE 10 MG/1
10 TABLET, FILM COATED ORAL EVERY 6 HOURS
Qty: 14 TABLET | Refills: 0 | Status: SHIPPED | OUTPATIENT
Start: 2022-11-10 | End: 2022-11-14

## 2022-11-10 RX ORDER — FENTANYL CITRATE 50 UG/ML
50 INJECTION, SOLUTION INTRAMUSCULAR; INTRAVENOUS
Status: COMPLETED | OUTPATIENT
Start: 2022-11-10 | End: 2022-11-10

## 2022-11-10 RX ORDER — IBUPROFEN 200 MG
24 TABLET ORAL
Status: DISCONTINUED | OUTPATIENT
Start: 2022-11-10 | End: 2022-11-15 | Stop reason: HOSPADM

## 2022-11-10 RX ORDER — OXYCODONE HYDROCHLORIDE 5 MG/1
5 TABLET ORAL EVERY 4 HOURS PRN
Status: DISCONTINUED | OUTPATIENT
Start: 2022-11-10 | End: 2022-11-10

## 2022-11-10 RX ORDER — DULOXETIN HYDROCHLORIDE 30 MG/1
30 CAPSULE, DELAYED RELEASE ORAL DAILY
Status: DISCONTINUED | OUTPATIENT
Start: 2022-11-10 | End: 2022-11-15 | Stop reason: HOSPADM

## 2022-11-10 RX ORDER — METOPROLOL SUCCINATE 50 MG/1
50 TABLET, EXTENDED RELEASE ORAL DAILY
Status: DISCONTINUED | OUTPATIENT
Start: 2022-11-10 | End: 2022-11-15 | Stop reason: HOSPADM

## 2022-11-10 RX ORDER — IBUPROFEN 200 MG
16 TABLET ORAL
Status: DISCONTINUED | OUTPATIENT
Start: 2022-11-10 | End: 2022-11-15 | Stop reason: HOSPADM

## 2022-11-10 RX ORDER — MUPIROCIN 20 MG/G
1 OINTMENT TOPICAL
Status: COMPLETED | OUTPATIENT
Start: 2022-11-10 | End: 2022-11-10

## 2022-11-10 RX ORDER — LIDOCAINE HYDROCHLORIDE 10 MG/ML
10 INJECTION, SOLUTION EPIDURAL; INFILTRATION; INTRACAUDAL; PERINEURAL
Status: COMPLETED | OUTPATIENT
Start: 2022-11-10 | End: 2022-11-10

## 2022-11-10 RX ORDER — KETOROLAC TROMETHAMINE 10 MG/1
10 TABLET, FILM COATED ORAL EVERY 6 HOURS PRN
Status: DISPENSED | OUTPATIENT
Start: 2022-11-10 | End: 2022-11-15

## 2022-11-10 RX ADMIN — MUPIROCIN 22 G: 20 OINTMENT TOPICAL at 03:11

## 2022-11-10 RX ADMIN — FENTANYL CITRATE 50 MCG: 50 INJECTION INTRAMUSCULAR; INTRAVENOUS at 01:11

## 2022-11-10 RX ADMIN — METOPROLOL SUCCINATE 50 MG: 50 TABLET, EXTENDED RELEASE ORAL at 09:11

## 2022-11-10 RX ADMIN — LIDOCAINE HYDROCHLORIDE 100 MG: 10 INJECTION, SOLUTION EPIDURAL; INFILTRATION; INTRACAUDAL; PERINEURAL at 02:11

## 2022-11-10 RX ADMIN — HYDROCODONE BITARTRATE AND ACETAMINOPHEN 1 TABLET: 7.5; 325 TABLET ORAL at 11:11

## 2022-11-10 RX ADMIN — HYDROCODONE BITARTRATE AND ACETAMINOPHEN 1 TABLET: 7.5; 325 TABLET ORAL at 06:11

## 2022-11-10 RX ADMIN — ONDANSETRON HYDROCHLORIDE 4 MG: 4 TABLET, FILM COATED ORAL at 07:11

## 2022-11-10 RX ADMIN — MORPHINE SULFATE 4 MG: 4 INJECTION INTRAVENOUS at 02:11

## 2022-11-10 RX ADMIN — MORPHINE SULFATE 4 MG: 4 INJECTION INTRAVENOUS at 10:11

## 2022-11-10 RX ADMIN — AMLODIPINE BESYLATE 5 MG: 5 TABLET ORAL at 01:11

## 2022-11-10 RX ADMIN — MORPHINE SULFATE 4 MG: 4 INJECTION INTRAVENOUS at 07:11

## 2022-11-10 RX ADMIN — DULOXETINE 30 MG: 30 CAPSULE, DELAYED RELEASE ORAL at 09:11

## 2022-11-10 NOTE — PT/OT/SLP EVAL
Physical Therapy Evaluation    Patient Name:  Leslie Wood   MRN:  [id number]    Recommendations:     Discharge Recommendations:  nursing facility, skilled   Discharge Equipment Recommendations: none   Barriers to discharge: Inaccessible home and Decreased caregiver support    Assessment:     Leslie Wood is a 82 y.o. female admitted with a medical diagnosis of Hemarthrosis of right knee.  She presents with the following impairments/functional limitations:  weakness, impaired endurance, impaired self care skills, impaired functional mobility, gait instability, impaired balance, pain, decreased safety awareness, decreased lower extremity function, decreased upper extremity function, decreased ROM, orthopedic precautions .    Rehab Prognosis: Good; patient would benefit from acute skilled PT services to address these deficits and reach maximum level of function.    Recent Surgery: * No surgery found *      Plan:     During this hospitalization, patient to be seen 3 x/week to address the identified rehab impairments via therapeutic activities, therapeutic exercises and progress toward the following goals:    Plan of Care Expires:  11/24/22    Subjective     Chief Complaint: PAIN   Patient/Family Comments/goals: DEC PAIN AND INC STRENGTH  Pain/Comfort:  Pain Rating 1: 10/10  Location - Side 1: Right  Location 1: leg  Pain Addressed 1: Nurse notified  Pain Rating Post-Intervention 1: 10/10    Patients cultural, spiritual, Uatsdin conflicts given the current situation:      Living Environment:  PT LIVES AT HOME WITH DAUGHTER AND GRANDSON WHO ARE ALWAYS HOME TO S PT . PT HAS ONE STEP TO ENTER HOME AND DOESN'T DRIVE   Prior to admission, patients level of function was A FURNITURE AMBULATOR .  Equipment used at home: grab bar, shower chair.  DME owned (not currently used): rolling walker, single point cane, bedside commode, and wheelchair.  Upon discharge, patient will have assistance from FAMILY .    Objective:  "    Communicated with NURSE, DR. PIZARRO AND [facility] CHART REVIEW  prior to session.  Patient found supine with peripheral IV, telemetry, pulse ox (continuous), PureWick, blood pressure cuff  upon PT entry to room.    General Precautions: Standard, fall   Orthopedic Precautions:RLE weight bearing as tolerated   Braces: Knee immobilizer (FOR IN STABILITY OF R KNEE WITH GT PER DR. FONTENOT)  Respiratory Status: Room air    Exams:  Cognitive Exam:  Patient is oriented to Person, Place, and Situation  Skin Integrity/Edema:      -       BRUISING ON FACE AND SWELLING OF R KNEE   RLE ROM: IMPAIRED D/T PAIN   RLE Strength: UNABLE TO ASSESS D/T PAIN   LLE ROM: WFL  LLE Strength: WFL    Functional Mobility:  Bed Mobility:     Bridging: maximal assistance and X2  Supine to Sit: maximal assistance and of X2 persons  Sit to Supine: maximal assistance and of X2 persons      AM-PAC 6 CLICK MOBILITY  Total Score:8       Treatment & Education:  PT SEATED EOB CRYING IN PT. P.T. EDUCATED PT ON IMPORTANCE OF EARLY INTERVENTION AND MOBILITY TO PROGRESS STRENGTH AND GROSS FUNC MOBILITY AS WELL AS DEC RISK OF PNEUMONIA , BLOOD CLOTS AND BED SORE. PT REPORTED UNDERSTANDING. PT COMPLETED AP SEATED EOB X 5 REPS. PT ABLE TO SUPPORT SELF EOB WITH B UE FOR SUPPORT. PT CONT REPORTED " I CANT THROUGH OUT EVAL AND TX. PT RETURNED SUP IN BED AND SCOOTED TO HOB WITH TOTAL A. PT HOB ELEVATED AND R LE ELEVATED WITH EMPHASIS ON R KNEE EXTENSION. PT COMPLETED GLUT SET, AP AND QUAD SETS X 5 REPS . PT LEFT WITH ALL NEEDS MET AND CALL BELL IN REACH.     Patient left HOB elevated with call button in reach and NURSE notified.    GOALS:   Multidisciplinary Problems       Physical Therapy Goals          Problem: Physical Therapy    Goal Priority Disciplines Outcome Goal Variances Interventions   Physical Therapy Goal     PT, PT/OT      Description: LT22  1. PT WILL COMPLETE BED MOBILITY WITH MIN A  2. PT WILL STAND T/F TO CHAIR WITH RW WBAT WITH RW AND MAX " A  3. PT WILL COMPLETE B LE X 10 REPS TO FACILITATE INC AROM AND STRENGTHENING.                        History:     Past Medical History:   Diagnosis Date    Arthritis     Cataract     GERD (gastroesophageal reflux disease)     Heart attack 05/18/2022    Heart attack 05/23/2022    Hyperlipidemia     Hypertension     Stroke        Past Surgical History:   Procedure Laterality Date    ADENOIDECTOMY      ADRENAL GLAND SURGERY      APPENDECTOMY      BACK SURGERY      fusion l 4-5 s 1,2,3  fusion l 2-3    CORONARY ANGIOGRAPHY N/A 5/20/2022    Procedure: ANGIOGRAM, CORONARY ARTERY;  Surgeon: Domingo Martins MD;  Location: [facility] CATH LAB;  Service: Cardiology;  Laterality: N/A;    CORONARY ANGIOGRAPHY N/A 5/24/2022    Procedure: ANGIOGRAM, CORONARY ARTERY;  Surgeon: Dominog Martins MD;  Location: [facility] CATH LAB;  Service: Cardiology;  Laterality: N/A;    EYE SURGERY      HEMORRHOID SURGERY      HERNIA REPAIR      HYSTERECTOMY      indirect lumbar decompression      percutaneous placement of interspinous extension blocker    LEFT HEART CATHETERIZATION Left 5/20/2022    Procedure: CATHETERIZATION, HEART, LEFT;  Surgeon: Domingo Martins MD;  Location: [facility] CATH LAB;  Service: Cardiology;  Laterality: Left;    TONSILLECTOMY         Time Tracking:     PT Received On: 11/10/22  PT Start Time: 1035     PT Stop Time: 1115  PT Total Time (min): 40 min     Billable Minutes: Evaluation 15 and Therapeutic Activity 25      11/10/2022

## 2022-11-10 NOTE — HPI
Leslie Wood is an 82-year-old female with past medical history significant for hypertension, hyperlipidemia, diabetes, vitamin-D deficiency, anxiety, chronic back pain, osteopenia, CVA, and stress-induced cardiomyopathy who is admitted for right knee pain and swelling following a possible unwitnessed fall.  She has severe arthritis of the right knee and is a patient of Dr. Kvng Cazares.  Patient was last seen by Olivia Garcia PA-C on 09/20/2022 and received corticosteroid injection to the right knee.  Patient's daughter is with her in the emergency department and reports that she was in the other room when the injury occurred.  She heard a loud crash and thinks that her mother either fell or walked into a closed door.  Patient complains of severe pain and swelling to the right knee.  Patient has a history of low back problems that radiates into lower extremities.  Patient denies change in her motor function or sensation at this time.  Patient takes aspirin, but she is not currently on any other blood thinners

## 2022-11-10 NOTE — ASSESSMENT & PLAN NOTE
Ortho eval and Aspiration was performed and 64 cc of bloody fluid was removed.    Patient should be placed into a knee immobilizer and follow-up with [facility] in 7-10 days.   Cleared for discharge from ortho standpoint.    PT OT evaluated and recommended skilled nursing place facility placement, follow-up with .

## 2022-11-10 NOTE — SUBJECTIVE & OBJECTIVE
Past Medical History:   Diagnosis Date    Arthritis     Cataract     GERD (gastroesophageal reflux disease)     Heart attack 05/18/2022    Heart attack 05/23/2022    Hyperlipidemia     Hypertension     Stroke        Past Surgical History:   Procedure Laterality Date    ADENOIDECTOMY      ADRENAL GLAND SURGERY      APPENDECTOMY      BACK SURGERY      fusion l 4-5 s 1,2,3  fusion l 2-3    CORONARY ANGIOGRAPHY N/A 5/20/2022    Procedure: ANGIOGRAM, CORONARY ARTERY;  Surgeon: Domingo Martins MD;  Location: [facility] CATH LAB;  Service: Cardiology;  Laterality: N/A;    CORONARY ANGIOGRAPHY N/A 5/24/2022    Procedure: ANGIOGRAM, CORONARY ARTERY;  Surgeon: Domingo Martins MD;  Location: [facility] CATH LAB;  Service: Cardiology;  Laterality: N/A;    EYE SURGERY      HEMORRHOID SURGERY      HERNIA REPAIR      HYSTERECTOMY      indirect lumbar decompression      percutaneous placement of interspinous extension blocker    LEFT HEART CATHETERIZATION Left 5/20/2022    Procedure: CATHETERIZATION, HEART, LEFT;  Surgeon: Domingo Martins MD;  Location: [facility] CATH LAB;  Service: Cardiology;  Laterality: Left;    TONSILLECTOMY         Review of patient's allergies indicates:   Allergen Reactions    Prazosin Other (See Comments)     dizziness    Amitriptyline     Hydrochlorothiazide      Causes muscle cramping    Lisinopril      hyperkalemia    Percocet [oxycodone-acetaminophen] Other (See Comments)     Seizures    Belbuca [buprenorphine hcl] Nausea And Vomiting and Other (See Comments)     Black out     Codeine Nausea Only and Rash       No current facility-administered medications on file prior to encounter.     Current Outpatient Medications on File Prior to Encounter   Medication Sig    aspirin (ECOTRIN) 81 MG EC tablet Take 81 mg by mouth once daily.    atorvastatin (LIPITOR) 80 MG tablet Take 1 tablet (80 mg total) by mouth every evening.    DULoxetine (CYMBALTA) 30 MG capsule Take 30 mg by mouth once daily.    furosemide (LASIX) 20  MG tablet Take 1 tablet (20 mg total) by mouth once daily.    metoprolol succinate (TOPROL-XL) 50 MG 24 hr tablet Take 1 tablet (50 mg total) by mouth once daily.    NUCYNTA 50 mg Tab Take 50 mg by mouth every 8 (eight) hours as needed.    ramipriL (ALTACE) 10 MG capsule Take 10 mg by mouth once daily.    tiZANidine (ZANAFLEX) 4 MG tablet Take 4 mg by mouth once daily.    traMADoL (ULTRAM) 50 mg tablet Take 50 mg by mouth every 12 (twelve) hours as needed.    [DISCONTINUED] carvediloL (COREG) 6.25 MG tablet Take 1 tablet (6.25 mg total) by mouth 2 (two) times daily. TAKE (1) TABLET BY MOUTH 2 TIMES A DAY WITH MEALS    [DISCONTINUED] cloNIDine (CATAPRES) 0.1 MG tablet Take 1 tablet (0.1 mg total) by mouth 2 (two) times daily. To take an extra dose if BP >160/90    [DISCONTINUED] clopidogreL (PLAVIX) 75 mg tablet Take 1 tablet (75 mg total) by mouth once daily.    [DISCONTINUED] diclofenac sodium (VOLTAREN) 1 % Gel Apply 2 g topically 3 (three) times daily.    [DISCONTINUED] isosorbide mononitrate (IMDUR) 30 MG 24 hr tablet TAKE 1 TABLET BY MOUTH TWICE A DAY    [DISCONTINUED] metoprolol tartrate (LOPRESSOR) 25 MG tablet Take 1 tablet (25 mg total) by mouth 3 (three) times daily.    [DISCONTINUED] nitroGLYCERIN (NITROSTAT) 0.4 MG SL tablet Place 1 tablet (0.4 mg total) under the tongue every 5 (five) minutes as needed for Chest pain.    [DISCONTINUED] valsartan (DIOVAN) 160 MG tablet TAKE 1 TABLET BY MOUTH TWICE A DAY     Family History       Problem Relation (Age of Onset)    Breast cancer Sister    Diabetes Mother    Heart disease Mother    Hypertension Mother, Father    Kidney disease Father          Tobacco Use    Smoking status: Never    Smokeless tobacco: Never   Substance and Sexual Activity    Alcohol use: No    Drug use: No    Sexual activity: Not Currently     Review of Systems  Constitutional: Negative for chills, decreased appetite, fever and weight loss.   HENT:  Negative for congestion, hoarse voice and  sore throat.    Eyes:  Negative for blurred vision, double vision, vision loss in left eye and vision loss in right eye.   Cardiovascular:  Negative for chest pain, palpitations and syncope.   Respiratory:  Negative for cough, shortness of breath and wheezing.    Endocrine: Negative for cold intolerance and heat intolerance.   Hematologic/Lymphatic: Negative for bleeding problem. Does not bruise/bleed easily.   Skin:  Negative for dry skin, flushing, itching and suspicious lesions.   Musculoskeletal:  Positive for arthritis, back pain, falls, joint pain, joint swelling and stiffness.   Gastrointestinal:  Negative for abdominal pain, diarrhea, nausea and vomiting.   Genitourinary:  Negative for dysuria, frequency and urgency.   Neurological:  Negative for dizziness, headaches, numbness and paresthesias.   Psychiatric/Behavioral:  Negative for altered mental status and memory loss.   Objective:     Vital Signs (Most Recent):  Temp: 98.6 °F (37 °C) (11/09/22 2350)  Pulse: 86 (11/10/22 1030)  Resp: 20 (11/10/22 1121)  BP: (!) 168/96 (11/10/22 1030)  SpO2: 95 % (11/10/22 1030)   Vital Signs (24h Range):  Temp:  [98.6 °F (37 °C)] 98.6 °F (37 °C)  Pulse:  [82-96] 86  Resp:  [17-23] 20  SpO2:  [94 %-98 %] 95 %  BP: (111-195)/() 168/96     Weight: 63.5 kg (140 lb)  Body mass index is 26.45 kg/m².    Physical Exam     Constitutional: Patient is in mild distress due to pain. Well-developed and well-nourished.  Head: Normocephalic. Large regions of ecchymosis on the face, concentrated to right side   Eyes: EOM intact. Conjunctivae are not pale. No scleral icterus.  ENT: Mucous membranes are moist. Oropharynx is clear and symmetric.    Neck: Supple. Full ROM.   Cardiovascular: Regular rate. Regular rhythm. No murmurs, rubs, or gallops. Distal pulses are 2+ and symmetric.  Pulmonary/Chest: No respiratory distress. Clear to auscultation bilaterally. No wheezing or rales.  Abdominal: Soft and non-distended.    No rebound,  guarding, or rigidity.   Musculoskeletal:  Thoracic, lumbar, midline tenderness. No c spine tenderness. No edema. Old and new regions of ecchymosis on bruising on both arms.  Right leg: moderately sized joint effusion in the knee, patella non tender, medial and lateral joint tenderness.   Right hand and shoulder have large regions of ecchymosis.  Right hip tender.  Skin: Warm and dry. On the left 5th digit there is a visible splinter extending from the tip of the fingernail bed to the base of the fingernail bed.   Neurological:  Alert, awake, and appropriate.  Normal speech.  No acute focal neurological deficits are appreciated. Limited right knee exam given pain, but patient is able to wiggle toes and move her ankle appropriately.   Psychiatric: Normal affect. Good eye contact. Appropriate in content.    Significant Labs:     Results for orders placed or performed during the hospital encounter of 11/10/22   CBC auto differential   Result Value Ref Range    WBC 12.37 3.90 - 12.70 K/uL    RBC 4.45 4.00 - 5.40 M/uL    Hemoglobin 13.1 12.0 - 16.0 g/dL    Hematocrit 39.0 37.0 - 48.5 %    MCV 88 82 - 98 fL    MCH 29.4 27.0 - 31.0 pg    MCHC 33.6 32.0 - 36.0 g/dL    RDW 14.3 11.5 - 14.5 %    Platelets 224 150 - 450 K/uL    MPV 9.7 9.2 - 12.9 fL    Immature Granulocytes 0.3 0.0 - 0.5 %    Gran # (ANC) 9.4 (H) 1.8 - 7.7 K/uL    Immature Grans (Abs) 0.04 0.00 - 0.04 K/uL    Lymph # 1.4 1.0 - 4.8 K/uL    Mono # 1.5 (H) 0.3 - 1.0 K/uL    Eos # 0.0 0.0 - 0.5 K/uL    Baso # 0.03 0.00 - 0.20 K/uL    nRBC 0 0 /100 WBC    Gran % 75.9 (H) 38.0 - 73.0 %    Lymph % 11.5 (L) 18.0 - 48.0 %    Mono % 12.0 4.0 - 15.0 %    Eosinophil % 0.1 0.0 - 8.0 %    Basophil % 0.2 0.0 - 1.9 %    Differential Method Automated    Comprehensive metabolic panel   Result Value Ref Range    Sodium 142 136 - 145 mmol/L    Potassium 4.2 3.5 - 5.1 mmol/L    Chloride 108 95 - 110 mmol/L    CO2 22 (L) 23 - 29 mmol/L    Glucose 174 (H) 70 - 110 mg/dL    BUN 17 8  - 23 mg/dL    Creatinine 0.8 0.5 - 1.4 mg/dL    Calcium 9.8 8.7 - 10.5 mg/dL    Total Protein 6.5 6.0 - 8.4 g/dL    Albumin 3.8 3.5 - 5.2 g/dL    Total Bilirubin 1.1 (H) 0.1 - 1.0 mg/dL    Alkaline Phosphatase 92 55 - 135 U/L    AST 39 10 - 40 U/L    ALT 53 (H) 10 - 44 U/L    Anion Gap 12 8 - 16 mmol/L    eGFR >60 >60 mL/min/1.73 m^2        Significant Imaging:     Imaging Results              X-Ray Hip 2 or 3 views Right (with Pelvis when performed) (Final result)  Result time 11/10/22 07:39:24      Final result by Floyd Martinez MD (11/10/22 07:39:24)                   Impression:      No acute abnormality identified.      Electronically signed by: Floyd Martinez  Date:    11/10/2022  Time:    07:39               Narrative:    EXAMINATION:  XR HIP WITH PELVIS WHEN PERFORMED, 2 OR 3  VIEWS RIGHT    CLINICAL HISTORY:  Right hip deformity;    TECHNIQUE:  AP view of the pelvis and frog leg lateral view of the left hip were performed.    COMPARISON:  None    FINDINGS:  No evidence of fracture or dislocation.  Sacroiliac joints unremarkable.  Hip joints appear well maintained.  Soft tissues unremarkable.                        ED Interpretation by Romulo Harris MD (11/10/22 01:41:57, [facility] Dept[facility] Emergency Medicine)    No fracture or dislocation.                                      X-Ray Knee 3 View Right (Final result)  Result time 11/10/22 07:41:06      Final result by Floyd Martinez MD (11/10/22 07:41:06)                   Impression:      No acute osseous abnormality identified. Large suprapatellar knee joint effusion.      Electronically signed by: Floyd Martinez  Date:    11/10/2022  Time:    07:41               Narrative:    EXAMINATION:  XR KNEE 3 VIEW RIGHT    CLINICAL HISTORY:  Pain in right knee    TECHNIQUE:  AP, lateral, and Merchant views of the right knee were performed.    COMPARISON:  11/09/2022    FINDINGS:  No evidence of fracture or dislocation.  Large suprapatellar knee joint effusion.   Lateral translation of the tibia relative to the femur.  Bulky tricompartmental osteophytes.  Moderate to severe nearly bone-on-bone joint space narrowing of the medial and lateral compartments.  Bones appear osteopenic.                        ED Interpretation by Romulo Harris MD (11/10/22 01:41:22, [facility] Emergency Medicine)    Knee effusion. Arthritis. No Fx or dislocation

## 2022-11-10 NOTE — ED PROVIDER NOTES
SCRIBE #1 NOTE: I, Ruth Mullins, am scribing for, and in the presence of, Romulo Harris MD. I have scribed the entire note.       History     Chief Complaint   Patient presents with    Leg Pain     Increasing R leg pain from fall today, was seen in ER and d/c'd. Splint in place.      Review of patient's allergies indicates:   Allergen Reactions    Prazosin Other (See Comments)     dizziness    Amitriptyline     Hydrochlorothiazide      Causes muscle cramping    Lisinopril      hyperkalemia    Percocet [oxycodone-acetaminophen] Other (See Comments)     Seizures    Belbuca [buprenorphine hcl] Nausea And Vomiting and Other (See Comments)     Black out     Codeine Nausea Only and Rash         History of Present Illness     HPI    11/10/2022, 12:56 AM  History obtained from the patient      History of Present Illness: Leslie Wood is a 82 y.o. female patient with a PMHx of arthritis who presents to the Emergency Department for evaluation of worsening right knee pain after a fall which onset this AM. Pt was seen here earlier today after a fall and discharged home after a benign workup including CT head, face, neck, T spine, L spine as well as XR's of her right hand, wrist, and knee. She was found to have several bruises, as well as a right knee hematoma. Pt reports increased pain since making it home, causing her to come back. Pt was given fentanyl PTA by EMS. Symptoms are constant and moderate in severity. Moving her right knee or touching it causes severe pain. Pt also c/o back pain and hip pain, as well as a splinter in her left 5th digit under her fingernail. Patient denies any fever, chills, neck pain, abdominal pain, chest pain, and all other sxs at this time. No prior tx reported otherwise. No further complaints or concerns at this time.       Arrival mode: Ambulance Service    PCP: Angeles Carson MD        Past Medical History:  Past Medical History:   Diagnosis Date    Arthritis     Cataract     GERD  (gastroesophageal reflux disease)     Heart attack 05/18/2022    Heart attack 05/23/2022    Hyperlipidemia     Hypertension     Stroke        Past Surgical History:  Past Surgical History:   Procedure Laterality Date    ADENOIDECTOMY      ADRENAL GLAND SURGERY      APPENDECTOMY      BACK SURGERY      fusion l 4-5 s 1,2,3  fusion l 2-3    CORONARY ANGIOGRAPHY N/A 5/20/2022    Procedure: ANGIOGRAM, CORONARY ARTERY;  Surgeon: Domingo Martins MD;  Location: [facility] CATH LAB;  Service: Cardiology;  Laterality: N/A;    CORONARY ANGIOGRAPHY N/A 5/24/2022    Procedure: ANGIOGRAM, CORONARY ARTERY;  Surgeon: Domingo Martins MD;  Location: [facility] CATH LAB;  Service: Cardiology;  Laterality: N/A;    EYE SURGERY      HEMORRHOID SURGERY      HERNIA REPAIR      HYSTERECTOMY      indirect lumbar decompression      percutaneous placement of interspinous extension blocker    LEFT HEART CATHETERIZATION Left 5/20/2022    Procedure: CATHETERIZATION, HEART, LEFT;  Surgeon: Domingo Martins MD;  Location: [facility] CATH LAB;  Service: Cardiology;  Laterality: Left;    TONSILLECTOMY           Family History:  Family History   Problem Relation Age of Onset    Heart disease Mother     Hypertension Mother     Diabetes Mother     Hypertension Father     Kidney disease Father     Breast cancer Sister        Social History:  Social History     Tobacco Use    Smoking status: Never    Smokeless tobacco: Never   Substance and Sexual Activity    Alcohol use: No    Drug use: No    Sexual activity: Not Currently        Review of Systems     Review of Systems   Constitutional:  Negative for chills and fever.   HENT:  Negative for congestion and sore throat.    Eyes:  Negative for pain and visual disturbance.   Respiratory:  Negative for cough and shortness of breath.    Cardiovascular:  Negative for chest pain.   Gastrointestinal:  Negative for abdominal pain, diarrhea, nausea and vomiting.   Genitourinary:  Negative for dysuria and hematuria.    Musculoskeletal:  Positive for back pain. Negative for neck pain.        (+) leg pain  (+) hip pain   Skin:  Negative for rash.        Bruising and splinters.    Neurological:  Negative for syncope and numbness.   All other systems reviewed and are negative.     Physical Exam     Initial Vitals [11/09/22 2350]   BP Pulse Resp Temp SpO2   (!) 170/84 96 20 98.6 °F (37 °C) 98 %      MAP       --          Physical Exam  Nursing Notes and Vital Signs Reviewed.  Constitutional: Patient is in mild distress due to pain. Well-developed and well-nourished.  Head: Normocephalic. Large regions of ecchymosis on the face, concentrated to right side      Eyes: EOM intact. Conjunctivae are not pale. No scleral icterus.  ENT: Mucous membranes are moist. Oropharynx is clear and symmetric.    Neck: Supple. Full ROM.   Cardiovascular: Regular rate. Regular rhythm. No murmurs, rubs, or gallops. Distal pulses are 2+ and symmetric.  Pulmonary/Chest: No respiratory distress. Clear to auscultation bilaterally. No wheezing or rales.  Abdominal: Soft and non-distended.    No rebound, guarding, or rigidity.   Musculoskeletal:  Thoracic, lumbar, midline tenderness. No c spine tenderness. No edema. Old and new regions of ecchymosis on bruising on both arms.  Right leg: moderately sized joint effusion in the knee, patella non tender, medial and lateral joint tenderness.   Right hand and shoulder have large regions of ecchymosis.  Right hip tender.          Skin: Warm and dry. On the left 5th digit there is a visible splinter extending from the tip of the fingernail bed to the base of the fingernail bed.   Neurological:  Alert, awake, and appropriate.  Normal speech.  No acute focal neurological deficits are appreciated. Limited right knee exam given pain, but patient is able to wiggle toes and move her ankle appropriately.   Psychiatric: Normal affect. Good eye contact. Appropriate in content.     ED Course   Splint Application    Date/Time:  "11/10/2022 1:58 AM  Performed by: Romulo Harris MD  Authorized by: Romulo Harris MD   Consent Done: Yes  Consent: Verbal consent obtained.  Consent given by: patient  Location details: right knee  Splint type: knee immobilizer.  Post-procedure: The splinted body part was neurovascularly unchanged following the procedure.  Patient tolerance: Patient tolerated the procedure well with no immediate complications      Foreign Body    Date/Time: 11/10/2022 2:22 AM  Performed by: Romulo Harris MD  Authorized by: Rudy Nicole MD   Consent Done: Yes  Consent: Verbal consent obtained.  Body area: skin  General location: upper extremity  Location details: left small finger  Anesthesia: nerve block    Anesthesia:  Local Anesthetic: lidocaine 1% without epinephrine  Anesthetic total: 5 mL    Patient sedated: no  Patient restrained: no  Patient cooperative: yes  Removal mechanism: forceps  Dressing: antibiotic ointment and dressing applied  Tendon involvement: superficial  Depth: subcutaneous  Complexity: simple  1 objects recovered.  Objects recovered: Slinter from under fingernail, about 1 cm long.  Post-procedure assessment: foreign body removed  Patient tolerance: Patient tolerated the procedure well with no immediate complications    ED Vital Signs:  Vitals:    11/09/22 2350 11/10/22 0108 11/10/22 0109   BP: (!) 170/84     Pulse: 96     Resp: 20  20   Temp: 98.6 °F (37 °C)     TempSrc: Oral     SpO2: 98%     Weight:  63.5 kg (140 lb)    Height: 5' 1" (1.549 m)         Abnormal Lab Results:  Labs Reviewed   CBC W/ AUTO DIFFERENTIAL   COMPREHENSIVE METABOLIC PANEL        All Lab Results:    Imaging Results:  Imaging Results              X-Ray Hip 2 or 3 views Right (with Pelvis when performed) (Preliminary result)  Result time 11/10/22 01:41:57      ED Interpretation by Romulo Harris MD (11/10/22 01:41:57, [facility] Emergency Medicine)    No fracture or dislocation.                                "       X-Ray Knee 3 View Right (Preliminary result)  Result time 11/10/22 01:41:22      ED Interpretation by Romulo Harris MD (11/10/22 01:41:22, [facility] Emergency Medicine)    Knee effusion. Arthritis. No Fx or dislocation                                          The Emergency Provider reviewed the vital signs and test results, which are outlined above.     ED Discussion       2:04 AM: Discussed case with Sammy Juarez MD ([facility]). Dr. Juarez agrees with current care and management of pt and accepts admission.   Admitting Service: Hospital Medicine  Admitting Physician: Dr. Juarez  Admit to: Obs med surg    2:05 AM: Re-evaluated pt. I have discussed test results, shared treatment plan, and the need for admission with patient and family at bedside. Pt and family express understanding at this time and agree with all information. All questions answered. Pt and family have no further questions or concerns at this time. Pt is ready for admit.           Medical Decision Making:   Clinical Tests:   Radiological Study: Ordered and Reviewed         ED Medication(s):  Medications   morphine injection 4 mg (has no administration in time range)   fentaNYL 50 mcg/mL injection 50 mcg (50 mcg Intravenous Given 11/10/22 0109)       New Prescriptions    No medications on file               Scribe Attestation:   Scribe #1: I performed the above scribed service and the documentation accurately describes the services I performed. I attest to the accuracy of the note.     Attending:   Physician Attestation Statement for Scribe #1: I, Romulo Harris MD, personally performed the services described in this documentation, as scribed by Ruth Mullins, in my presence, and it is both accurate and complete.           Clinical Impression       ICD-10-CM ICD-9-CM   1. Effusion of right knee  M25.461 719.06   2. Right knee pain  M25.561 719.46   3. Fall, initial encounter  W19.XXXA E888.9   4. Intractable pain  R52 780.96        Disposition:   Disposition: Placed in Observation  Condition: Fair       Romulo Harris MD  11/10/22 0320

## 2022-11-10 NOTE — Clinical Note
Diagnosis: Intractable pain [673099]   Future Attending Provider: GLENDA CHANEL [[id number]]   Admitting Provider:: GLENDA CHANEL [[id number]]

## 2022-11-10 NOTE — PT/OT/SLP EVAL
Occupational Therapy   Evaluation    Name: Leslie Wood  MRN: [id number]  Admitting Diagnosis:  Hemarthrosis of right knee  Recent Surgery: * No surgery found *      Recommendations:     Discharge Recommendations: nursing facility, skilled  Discharge Equipment Recommendations:  none  Barriers to discharge:  None    Assessment:     Leslie Wood is a 82 y.o. female with a medical diagnosis of Hemarthrosis of right knee.  She presents with the following performance deficits affecting function: weakness, impaired endurance, impaired self care skills, impaired functional mobility, gait instability, impaired balance, decreased upper extremity function, decreased lower extremity function, decreased safety awareness, pain, decreased ROM, orthopedic precautions.      Rehab Prognosis: Good; patient would benefit from acute skilled OT services to address these deficits and reach maximum level of function.       Plan:     Patient to be seen 2 x/week to address the above listed problems via self-care/home management, therapeutic activities, therapeutic exercises  Plan of Care Expires: 11/24/22  Plan of Care Reviewed with: patient, daughter    Subjective     Chief Complaint: RLE PAIN  Patient/Family Comments/goals: decrease pain and improve independence    Occupational Profile:  Living Environment: lives with daughter and grandson in a 1 story house with threshold to enter. Pt has someone home to assist 24/7.  Previous level of function: Pt Mod (I) with ADLs and functional mobility household distances. Pt reports using furniture to ambulate.  Roles and Routines: does not drive  Equipment Used at Home:  walker, rolling, wheelchair, bedside commode, shower chair, cane, quad, grab bar  Assistance upon Discharge: family    Pain/Comfort:  Pain Rating 1: 10/10  Location - Side 1: Right  Location - Orientation 1: generalized  Location 1: leg  Pain Addressed 1: Nurse notified  Pain Rating Post-Intervention 1: 10/10    Objective:      Communicated with: nurse Lunsford and epic chart review prior to session.  Patient found supine with blood pressure cuff, pulse ox (continuous), telemetry, peripheral IV, PureWick upon OT entry to room.    General Precautions: Standard, fall   Orthopedic Precautions:RLE weight bearing as tolerated   Braces: Knee immobilizer (for stability of R knee with gait per Dr. Ryan)  Respiratory Status: Room air    Occupational Performance:    Bed Mobility:    Patient completed Scooting/Bridging with maximal assistance and 2 persons  Patient completed Supine to Sit with maximal assistance and 2 persons  Patient completed Sit to Supine with maximal assistance and 2 persons    Functional Mobility/Transfers:  Unable to perform at this time d/t pain.    Activities of Daily Living:  Lower Body Dressing: total assistance sultana socks    Cognitive/Visual Perceptual:  Cognitive/Psychosocial Skills:     -       Oriented to: Person, Place, Time, and Situation   -       Follows Commands/attention:Follows two-step commands  -       Communication: clear/fluent  -       Safety awareness/insight to disability: impaired   -       Mood/Affect/Coping skills/emotional control: Tearful and Anxious    Physical Exam:  Skin integrity: Bruising of face and RUE.  Edema:  R knee  Sensation:    -       Intact  Upper Extremity Range of Motion:     -       Right Upper Extremity: Deficits: Significantly limited AROM in shoulder d/t previous rotator cuff injury, Limited AROM in elbow.  -       Left Upper Extremity: WNL  Upper Extremity Strength:    -       Right Upper Extremity: Deficits: 2/5 in shoulder, 3-/5 in elbow  -       Left Upper Extremity: 4/5 grossly   Strength:    -       Right Upper Extremity: WFL  -       Left Upper Extremity: WFL    AMPAC 6 Click ADL:  AMPAC Total Score: 14    Treatment & Education:  Pt in significant pain in RLE throughout eval, limiting performance. Pt able to support self EOB with SPV and BUE support for sitting  "balance. Max A to sultana gown EOB. Pt repeating "I can't do this" and requesting to return to bed. Supine scoot to HOB with Total A x2. HOB elevated and RLE elevated on pillow with R knee in extension. Patient educated on role of OT in acute setting and benefits of participation. Educated on techniques to use to increase independence and improve bed mobility. Educated on importance of EOB/OOB activity and calling for A to meet needs. Encouraged completion of B UE AROM/AAROM therex throughout the day to tolerance to increase functional strength and activity tolerance. Educated pt on importance of keeping knee in extension to prevent stiffness and contracture. Patient stated understanding and in agreement with POC.     Patient left HOB elevated with all lines intact, call button in reach, nurse notified, and daughter present    GOALS:   Multidisciplinary Problems       Occupational Therapy Goals          Problem: Occupational Therapy    Goal Priority Disciplines Outcome Interventions   Occupational Therapy Goal     OT, PT/OT     Description: Goals to be met by: 11/24/22     Patient will increase functional independence with ADLs by performing:    Toileting from bedside commode with Minimal Assistance for hygiene and clothing management.   Stand pivot transfers with Moderate Assistance with RW.  Upper extremity exercise program x20 reps per handout, with assistance as needed.                         History:     Past Medical History:   Diagnosis Date    Arthritis     Cataract     GERD (gastroesophageal reflux disease)     Heart attack 05/18/2022    Heart attack 05/23/2022    Hyperlipidemia     Hypertension     Stroke          Past Surgical History:   Procedure Laterality Date    ADENOIDECTOMY      ADRENAL GLAND SURGERY      APPENDECTOMY      BACK SURGERY      fusion l 4-5 s 1,2,3  fusion l 2-3    CORONARY ANGIOGRAPHY N/A 5/20/2022    Procedure: ANGIOGRAM, CORONARY ARTERY;  Surgeon: Domingo Martins MD;  Location: [facility]" CATH LAB;  Service: Cardiology;  Laterality: N/A;    CORONARY ANGIOGRAPHY N/A 5/24/2022    Procedure: ANGIOGRAM, CORONARY ARTERY;  Surgeon: Domingo Martins MD;  Location: [facility] CATH LAB;  Service: Cardiology;  Laterality: N/A;    EYE SURGERY      HEMORRHOID SURGERY      HERNIA REPAIR      HYSTERECTOMY      indirect lumbar decompression      percutaneous placement of interspinous extension blocker    LEFT HEART CATHETERIZATION Left 5/20/2022    Procedure: CATHETERIZATION, HEART, LEFT;  Surgeon: Domingo Martins MD;  Location: [facility] CATH LAB;  Service: Cardiology;  Laterality: Left;    TONSILLECTOMY         Time Tracking:     OT Date of Treatment: 11/10/22  OT Start Time: 1030  OT Stop Time: 1108  OT Total Time (min): 38 min    Billable Minutes:Evaluation 15  Therapeutic Activity 23    11/10/2022  Jo Cohen OT

## 2022-11-10 NOTE — ASSESSMENT & PLAN NOTE
ortho evaluated and aspiration was done.    Aspirin withheld given hemarthrosis, significant bruises throughout the body.

## 2022-11-10 NOTE — CONSULTS
[facility]  Orthopedics  Consult Note    Patient Name: Leslie Wood  MRN: [id number]  Admission Date: 11/10/2022  Hospital Length of Stay: 0 days  Attending Provider: Rudy Nicole, *  Primary Care Provider: Angeles Carson MD    Patient information was obtained from patient, relative(s), past medical records, ER records, and primary team.     Inpatient consult to Orthopedic Surgery  Consult performed by: Luis E Hdez PA-C  Consult ordered by: Rudy Nicole MD      Subjective:     Principal Problem:<principal problem not specified>    Chief Complaint:   Chief Complaint   Patient presents with    Leg Pain     Increasing R leg pain from fall today, was seen in ER and d/c'd. Splint in place.         HPI: Leslie Wood is an 82-year-old female with past medical history significant for hypertension, hyperlipidemia, diabetes, vitamin-D deficiency, anxiety, chronic back pain, osteopenia, CVA, and stress-induced cardiomyopathy who is admitted for right knee pain and swelling following a possible unwitnessed fall.  She has severe arthritis of the right knee and is a patient of Dr. Kvng Cazares.  Patient was last seen by Olivia Garcia PA-C on 09/20/2022 and received corticosteroid injection to the right knee.  Patient's daughter is with her in the emergency department and reports that she was in the other room when the injury occurred.  She heard a loud crash and thinks that her mother either fell or walked into a closed door.  Patient complains of severe pain and swelling to the right knee.  Patient has a history of low back problems that radiates into lower extremities.  Patient denies change in her motor function or sensation at this time.  Patient takes aspirin, but she is not currently on any other blood thinners    Past Medical History:   Diagnosis Date    Arthritis     Cataract     GERD (gastroesophageal reflux disease)     Heart attack 05/18/2022    Heart attack 05/23/2022     Hyperlipidemia     Hypertension     Stroke        Past Surgical History:   Procedure Laterality Date    ADENOIDECTOMY      ADRENAL GLAND SURGERY      APPENDECTOMY      BACK SURGERY      fusion l 4-5 s 1,2,3  fusion l 2-3    CORONARY ANGIOGRAPHY N/A 5/20/2022    Procedure: ANGIOGRAM, CORONARY ARTERY;  Surgeon: Domingo Martins MD;  Location: [facility] CATH LAB;  Service: Cardiology;  Laterality: N/A;    CORONARY ANGIOGRAPHY N/A 5/24/2022    Procedure: ANGIOGRAM, CORONARY ARTERY;  Surgeon: Domingo Martins MD;  Location: [facility] CATH LAB;  Service: Cardiology;  Laterality: N/A;    EYE SURGERY      HEMORRHOID SURGERY      HERNIA REPAIR      HYSTERECTOMY      indirect lumbar decompression      percutaneous placement of interspinous extension blocker    LEFT HEART CATHETERIZATION Left 5/20/2022    Procedure: CATHETERIZATION, HEART, LEFT;  Surgeon: Domingo Martins MD;  Location: [facility] CATH LAB;  Service: Cardiology;  Laterality: Left;    TONSILLECTOMY         Review of patient's allergies indicates:   Allergen Reactions    Prazosin Other (See Comments)     dizziness    Amitriptyline     Hydrochlorothiazide      Causes muscle cramping    Lisinopril      hyperkalemia    Percocet [oxycodone-acetaminophen] Other (See Comments)     Seizures    Belbuca [buprenorphine hcl] Nausea And Vomiting and Other (See Comments)     Black out     Codeine Nausea Only and Rash       Current Facility-Administered Medications   Medication    acetaminophen tablet 650 mg    DULoxetine DR capsule 30 mg    HYDROcodone-acetaminophen 7.5-325 mg per tablet 1 tablet    metoprolol succinate (TOPROL-XL) 24 hr tablet 50 mg    morphine injection 4 mg    ondansetron tablet 4 mg     Current Outpatient Medications   Medication Sig    aspirin (ECOTRIN) 81 MG EC tablet Take 81 mg by mouth once daily.    atorvastatin (LIPITOR) 80 MG tablet Take 1 tablet (80 mg total) by mouth every evening.    clopidogreL (PLAVIX) 75 mg tablet Take 1 tablet (75 mg total) by mouth  once daily.    DULoxetine (CYMBALTA) 30 MG capsule Take 30 mg by mouth once daily.    furosemide (LASIX) 20 MG tablet Take 1 tablet (20 mg total) by mouth once daily.    metoprolol succinate (TOPROL-XL) 50 MG 24 hr tablet Take 1 tablet (50 mg total) by mouth once daily.    NUCYNTA 50 mg Tab Take by mouth.    ramipriL (ALTACE) 10 MG capsule Take 10 mg by mouth once daily.    tiZANidine (ZANAFLEX) 4 MG tablet Take 4 mg by mouth once daily.    traMADoL (ULTRAM) 50 mg tablet Take by mouth.     Family History       Problem Relation (Age of Onset)    Breast cancer Sister    Diabetes Mother    Heart disease Mother    Hypertension Mother, Father    Kidney disease Father          Tobacco Use    Smoking status: Never    Smokeless tobacco: Never   Substance and Sexual Activity    Alcohol use: No    Drug use: No    Sexual activity: Not Currently     Review of Systems   Constitutional: Negative for chills, decreased appetite, fever and weight loss.   HENT:  Negative for congestion, hoarse voice and sore throat.    Eyes:  Negative for blurred vision, double vision, vision loss in left eye and vision loss in right eye.   Cardiovascular:  Negative for chest pain, palpitations and syncope.   Respiratory:  Negative for cough, shortness of breath and wheezing.    Endocrine: Negative for cold intolerance and heat intolerance.   Hematologic/Lymphatic: Negative for bleeding problem. Does not bruise/bleed easily.   Skin:  Negative for dry skin, flushing, itching and suspicious lesions.   Musculoskeletal:  Positive for arthritis, back pain, falls, joint pain, joint swelling and stiffness.   Gastrointestinal:  Negative for abdominal pain, diarrhea, nausea and vomiting.   Genitourinary:  Negative for dysuria, frequency and urgency.   Neurological:  Negative for dizziness, headaches, numbness and paresthesias.   Psychiatric/Behavioral:  Negative for altered mental status and memory loss.    Objective:     Vital Signs (Most Recent):  Temp:  "98.6 °F (37 °C) (11/09/22 2350)  Pulse: 96 (11/10/22 0943)  Resp: 20 (11/10/22 0752)  BP: (!) 195/100 (11/10/22 0943)  SpO2: 95 % (11/10/22 0633)   Vital Signs (24h Range):  Temp:  [98.3 °F (36.8 °C)-98.6 °F (37 °C)] 98.6 °F (37 °C)  Pulse:  [66-96] 96  Resp:  [16-22] 20  SpO2:  [95 %-98 %] 95 %  BP: (111-195)/() 195/100     Weight: 63.5 kg (140 lb)  Height: 5' 1" (154.9 cm)  Body mass index is 26.45 kg/m².    No intake or output data in the 24 hours ending 11/10/22 1008    Ortho/SPM Exam  Right knee:  Skin is intact   Large effusion  Minimal edema the surrounding tissues   TTP diffusely throughout the knee  Knee ROM is unable to be assessed secondary to patient guarding  Calf compartments are soft and compressible  Motor exam  Sensation and pulses intact   Cap refill brisk    GEN: Well developed, well nourished female. AAOX3. No acute distress.   Head: Normocephalic, atraumatic.   Eyes: HENRI  Neck: Trachea is midline, no adenopathy  Resp: Breathing unlabored.  Neuro: Motor function normal, Cranial nerves intact  Psych: Mood and affect appropriate.      Significant Labs:   Recent Lab Results         11/10/22  0248        Albumin 3.8       Alkaline Phosphatase 92       ALT 53       Anion Gap 12       AST 39       Baso # 0.03       Basophil % 0.2       BILIRUBIN TOTAL 1.1  Comment: For infants and newborns, interpretation of results should be based  on gestational age, weight and in agreement with clinical  observations.    Premature Infant recommended reference ranges:  Up to 24 hours.............<8.0 mg/dL  Up to 48 hours............<12.0 mg/dL  3-5 days..................<15.0 mg/dL  6-29 days.................<15.0 mg/dL         BUN 17       Calcium 9.8       Chloride 108       CO2 22       Creatinine 0.8       Differential Method Automated       eGFR >60       Eos # 0.0       Eosinophil % 0.1       Glucose 174       Gran # (ANC) 9.4       Gran % 75.9       Hematocrit 39.0       Hemoglobin 13.1       Immature " Grans (Abs) 0.04  Comment: Mild elevation in immature granulocytes is non specific and   can be seen in a variety of conditions including stress response,   acute inflammation, trauma and pregnancy. Correlation with other   laboratory and clinical findings is essential.         Immature Granulocytes 0.3       Lymph # 1.4       Lymph % 11.5       MCH 29.4       MCHC 33.6       MCV 88       Mono # 1.5       Mono % 12.0       MPV 9.7       nRBC 0       Platelets 224       Potassium 4.2       PROTEIN TOTAL 6.5       RBC 4.45       RDW 14.3       Sodium 142       WBC 12.37             All pertinent labs within the past 24 hours have been reviewed.    Significant Imaging: X-Ray: I have reviewed all pertinent results/findings and my personal findings are:  X-ray of the right knee shows severe tricompartmental degenerative changes with osteophytes sclerosis, loss of joint space, and osteophyte formation.  There is also a large suprapatellar joint effusion.  No acute fracture or dislocation    Assessment/Plan:     Assessment:   82-year-old female with past medical history significant for hypertension, hyperlipidemia, diabetes, vitamin-D deficiency, anxiety, chronic back pain, osteopenia, CVA, and stress-induced cardiomyopathy is admitted for right knee pain with hemarthrosis from arthritic knee vs possible occult fracture    Plan:   Reviewed the radiographs with the patient and her daughter.  Recommended aspiration of her fusion.  Since the patient received corticosteroid injection just 6 weeks ago I recommended we only aspirate the joint at this time.  Aspiration was performed and 64 cc of bloody fluid was removed.  Patient should be placed into a knee immobilizer and follow-up with [facility] in 7-10 days.  Patient is ready for discharge from orthopedic standpoint.    Luis E Hdez PA-C  Orthopedics  [facility]

## 2022-11-10 NOTE — PLAN OF CARE
[facility]  Discharge Assessment    Primary Care Provider: Angeles Carson MD     Discharge Assessment (most recent)       BRIEF DISCHARGE ASSESSMENT - 11/10/22 5898          Discharge Planning    Assessment Type Discharge Planning Brief Assessment     Resource/Environmental Concerns none     Support Systems Family members     Equipment Currently Used at Home 3-in-1 commode;shower chair;walker, rolling;cane, quad;wheelchair     Current Living Arrangements home/apartment/condo     Patient/Family Anticipates Transition to home with family     Patient/Family Anticipated Services at Transition home health care     DME Needed Upon Discharge  none     Discharge Plan A Home Health     Discharge Plan B Home Health                     Brief assessment completed with patient's daughter Vicki. Patient's daughter reports dependence with ADL's  prior to hospitalization. Patient uses a walker, BSC, shower chair, cane, and wheelchair at home. Patient needs HH at [place]. Preference obtained for [facility]. Referral sent via [facility], patient accepted. CM will continue following to assist with other needs.   CM provided information on advanced directives, information on pharmacy bedside delivery, and discharge planning begins on admission with contact information for any needs/questions.

## 2022-11-10 NOTE — DISCHARGE SUMMARY
[facility] Hospital Medicine  Discharge Summary      Patient Name: Leslie Wood  MRN: [id number]  BECKY: [id number]  Patient Class: OP- Observation  Admission Date: 11/10/2022  Hospital Length of Stay: 0 days  Discharge Date and Time: No discharge date for patient encounter.  Attending Physician: Rudy Nicole, *   Discharging Provider: Rudy Nicole MD  Primary Care Provider: Angelse Carson MD    Primary Care Team: [facility] MEDICINE     HPI:   Leslie Wood is an 82-year-old female with past medical history significant for hypertension, hyperlipidemia, diabetes, vitamin-D deficiency, anxiety, chronic back pain, osteopenia, CVA, and stress-induced cardiomyopathy who is admitted for right knee pain and swelling following a possible unwitnessed fall.  She has severe arthritis of the right knee and is a patient of Dr. Kvng Cazares.  Patient was last seen by Olivia Garcia PA-C on 09/20/2022 and received corticosteroid injection to the right knee.  Patient's daughter is with her in the emergency department and reports that she was in the other room when the injury occurred.  She heard a loud crash and thinks that her mother either fell or walked into a closed door.  Patient complains of severe pain and swelling to the right knee.  Patient has a history of low back problems that radiates into lower extremities.  Patient denies change in her motor function or sensation at this time.  Patient takes aspirin, but she is not currently on any other blood thinners         * No surgery found *      Review of Systems  Constitutional: Negative for chills, decreased appetite, fever and weight loss.   HENT:  Negative for congestion, hoarse voice and sore throat.    Eyes:  Negative for blurred vision, double vision, vision loss in left eye and vision loss in right eye.   Cardiovascular:  Negative for chest pain, palpitations and syncope.   Respiratory:  Negative for cough, shortness of breath and  wheezing.    Endocrine: Negative for cold intolerance and heat intolerance.   Hematologic/Lymphatic: Negative for bleeding problem. Does not bruise/bleed easily.   Skin:  Negative for dry skin, flushing, itching and suspicious lesions.   Musculoskeletal:  Positive for arthritis, back pain, falls, joint pain, joint swelling and stiffness.   Gastrointestinal:  Negative for abdominal pain, diarrhea, nausea and vomiting.   Genitourinary:  Negative for dysuria, frequency and urgency.   Neurological:  Negative for dizziness, headaches, numbness and paresthesias.   Psychiatric/Behavioral:  Negative for altered mental status and memory loss.     Hospital Course:   11/10     Examination done at bedside, patient alert and oriented, stated improvement in pain after undergoing Ortho drainage;  Ortho evaluated and  stated that since the patient received corticosteroid injection just 6 weeks ago they recommended only aspirate the joint at this time.  Aspiration was performed and 64 cc of bloody fluid was removed.  Patient should be placed into a knee immobilizer and follow-up with Ortho Trauma Clinic in 7-10 days.  Patient is ready for discharge from orthopedic standpoint.  PT evaluated and recommended skilled nursing facility, however as per  patient does not qualify for facility at this time based on her insurance since patient does not meet 3 overnight criteria to stay in the hospital, discussed about the plan with the patient and daughter at bedside, they opted for home with home therapy- ordered home with home therapy accordingly.    Ordered pain medications, recommended on follow-up visits.    Labs reviewed;  Considering clinical and hemodynamic stability, planning to discharge patient today.    Patient and daughter agreed to the plan.    Charge nurse arranging on transport.       Review of Systems  Constitutional: Negative for chills, decreased appetite, fever and weight loss.   HENT:  Negative for congestion,  hoarse voice and sore throat.    Eyes:  Negative for blurred vision, double vision, vision loss in left eye and vision loss in right eye.   Cardiovascular:  Negative for chest pain, palpitations and syncope.   Respiratory:  Negative for cough, shortness of breath and wheezing.    Endocrine: Negative for cold intolerance and heat intolerance.   Hematologic/Lymphatic: Negative for bleeding problem. Does not bruise/bleed easily.   Skin:  Negative for dry skin, flushing, itching and suspicious lesions.   Musculoskeletal:  Positive for joint pain   Gastrointestinal:  Negative for abdominal pain, diarrhea, nausea and vomiting.   Genitourinary:  Negative for dysuria, frequency and urgency.   Neurological:  Negative for dizziness, headaches, numbness and paresthesias.   Psychiatric/Behavioral:  Negative for altered mental status and memory loss.    Physical Exam     Constitutional: Patient is in mild distress due to pain. Well-developed and well-nourished.  Head: Normocephalic. Large regions of ecchymosis on the face, concentrated to right side   Eyes: EOM intact. Conjunctivae are not pale. No scleral icterus.  ENT: Mucous membranes are moist. Oropharynx is clear and symmetric.    Neck: Supple. Full ROM.   Cardiovascular: Regular rate. Regular rhythm. No murmurs, rubs, or gallops. Distal pulses are 2+ and symmetric.  Pulmonary/Chest: No respiratory distress. Clear to auscultation bilaterally. No wheezing or rales.  Abdominal: Soft and non-distended.    No rebound, guarding, or rigidity.   Musculoskeletal:  Thoracic, lumbar, midline tenderness. No c spine tenderness. No edema. Old and new regions of ecchymosis on bruising on both arms.  Right leg: moderately sized joint effusion in the knee, patella non tender, medial and lateral joint tenderness.   Right hand and shoulder have large regions of ecchymosis.  Right hip tender.  Skin: Warm and dry. On the left 5th digit there is a visible splinter extending from the tip of the  fingernail bed to the base of the fingernail bed.   Neurological:  Alert, awake, and appropriate.  Normal speech.  No acute focal neurological deficits are appreciated. Limited right knee exam given pain, but patient is able to wiggle toes and move her ankle appropriately.   Psychiatric: Normal affect. Good eye contact. Appropriate in content.    Goals of Care Treatment Preferences:  Code Status: Full Code      Consults:   Consults (From admission, onward)          Status Ordering Provider     Inpatient consult to Social Work  Once        Provider:  (Not yet assigned)    Completed BUBBA PIZARRO     Inpatient consult to Orthopedic Surgery  Once        Provider:  (Not yet assigned)    Completed BUBBA PIZARRO            No new Assessment & Plan notes have been filed under this hospital service since the last note was generated.  Service: Hospital Medicine    Final Active Diagnoses:    Diagnosis Date Noted POA    PRINCIPAL PROBLEM:  Hemarthrosis of right knee [M25.061] 11/10/2022 Unknown    Effusion of right knee [M25.461] 11/10/2022 Unknown    Intractable pain [R52] 11/10/2022 Unknown    Essential hypertension [I10] 07/30/2015 Yes     Chronic      Problems Resolved During this Admission:       Discharged Condition: stable    Disposition: Home or Self Care    Follow Up:   Follow-up Information       [facility] [place] [facility] Follow up.    Specialty: Home Health Services  Why: Home Health, As needed  Contact information:  [address], [address]  [place] [address]  [phone]               Albert Ryan MD Follow up.    Specialty: Orthopedic Surgery  Why: Patient should be placed into a knee immobilizer and follow-up with Ortho Trauma Clinic in 7-10 days.  Contact information:  [id number] [place] [address] [place] [address]  [phone]               Albert Ryan MD .    Specialty: Orthopedic Surgery  Contact information:  [address]   [place] [address]                Angeles Carson MD Follow up in 1 week(s).    Specialty: Internal Medicine  Why: f/u within 1-2 weeks upon discharge  Contact information:  [address] [place] [address] [place] [address] [phone]                           Patient Instructions:      Ambulatory referral/consult to Orthopedics   Standing Status: Future   Referral Priority: Routine Referral Type: Consultation   Requested Specialty: Orthopedic Surgery   Number of Visits Requested: 1     Notify your health care provider if you experience any of the following:  temperature >100.4     Notify your health care provider if you experience any of the following:  persistent nausea and vomiting or diarrhea     Notify your health care provider if you experience any of the following:  severe uncontrolled pain     Notify your health care provider if you experience any of the following:  redness, tenderness, or signs of infection (pain, swelling, redness, odor or green/yellow discharge around incision site)     Activity as tolerated       Significant Diagnostic Studies:     Results for orders placed or performed during the hospital encounter of 11/10/22   CBC auto differential   Result Value Ref Range    WBC 12.37 3.90 - 12.70 K/uL    RBC 4.45 4.00 - 5.40 M/uL    Hemoglobin 13.1 12.0 - 16.0 g/dL    Hematocrit 39.0 37.0 - 48.5 %    MCV 88 82 - 98 fL    MCH 29.4 27.0 - 31.0 pg    MCHC 33.6 32.0 - 36.0 g/dL    RDW 14.3 11.5 - 14.5 %    Platelets 224 150 - 450 K/uL    MPV 9.7 9.2 - 12.9 fL    Immature Granulocytes 0.3 0.0 - 0.5 %    Gran # (ANC) 9.4 (H) 1.8 - 7.7 K/uL    Immature Grans (Abs) 0.04 0.00 - 0.04 K/uL    Lymph # 1.4 1.0 - 4.8 K/uL    Mono # 1.5 (H) 0.3 - 1.0 K/uL    Eos # 0.0 0.0 - 0.5 K/uL    Baso # 0.03 0.00 - 0.20 K/uL    nRBC 0 0 /100 WBC    Gran % 75.9 (H) 38.0 - 73.0 %    Lymph % 11.5 (L) 18.0 - 48.0 %    Mono % 12.0 4.0 - 15.0 %    Eosinophil % 0.1 0.0 - 8.0 %    Basophil % 0.2 0.0 - 1.9 %    Differential Method Automated    Comprehensive  metabolic panel   Result Value Ref Range    Sodium 142 136 - 145 mmol/L    Potassium 4.2 3.5 - 5.1 mmol/L    Chloride 108 95 - 110 mmol/L    CO2 22 (L) 23 - 29 mmol/L    Glucose 174 (H) 70 - 110 mg/dL    BUN 17 8 - 23 mg/dL    Creatinine 0.8 0.5 - 1.4 mg/dL    Calcium 9.8 8.7 - 10.5 mg/dL    Total Protein 6.5 6.0 - 8.4 g/dL    Albumin 3.8 3.5 - 5.2 g/dL    Total Bilirubin 1.1 (H) 0.1 - 1.0 mg/dL    Alkaline Phosphatase 92 55 - 135 U/L    AST 39 10 - 40 U/L    ALT 53 (H) 10 - 44 U/L    Anion Gap 12 8 - 16 mmol/L    eGFR >60 >60 mL/min/1.73 m^2       Imaging Results              X-Ray Hip 2 or 3 views Right (with Pelvis when performed) (Final result)  Result time 11/10/22 07:39:24      Final result by Floyd Martinez MD (11/10/22 07:39:24)                   Impression:      No acute abnormality identified.      Electronically signed by: Floyd Martinez  Date:    11/10/2022  Time:    07:39               Narrative:    EXAMINATION:  XR HIP WITH PELVIS WHEN PERFORMED, 2 OR 3  VIEWS RIGHT    CLINICAL HISTORY:  Right hip deformity;    TECHNIQUE:  AP view of the pelvis and frog leg lateral view of the left hip were performed.    COMPARISON:  None    FINDINGS:  No evidence of fracture or dislocation.  Sacroiliac joints unremarkable.  Hip joints appear well maintained.  Soft tissues unremarkable.                        ED Interpretation by Romulo [place] MD (11/10/22 01:41:57, [facility] - Emergency Dept., Emergency Medicine)    No fracture or dislocation.                                      X-Ray Knee 3 View Right (Final result)  Result time 11/10/22 07:41:06      Final result by Floyd Martinez MD (11/10/22 07:41:06)                   Impression:      No acute osseous abnormality identified. Large suprapatellar knee joint effusion.      Electronically signed by: Floyd Martinez  Date:    11/10/2022  Time:    07:41               Narrative:    EXAMINATION:  XR KNEE 3 VIEW RIGHT    CLINICAL HISTORY:  Pain in right knee    TECHNIQUE:  AP,  lateral, and Merchant views of the right knee were performed.    COMPARISON:  11/09/2022    FINDINGS:  No evidence of fracture or dislocation.  Large suprapatellar knee joint effusion.  Lateral translation of the tibia relative to the femur.  Bulky tricompartmental osteophytes.  Moderate to severe nearly bone-on-bone joint space narrowing of the medial and lateral compartments.  Bones appear osteopenic.                        ED Interpretation by Romulo Harris MD (11/10/22 01:41:22, [facility] - Emergency Dept., Emergency Medicine)    Knee effusion. Arthritis. No Fx or dislocation                                    Pending Diagnostic Studies:       None           Medications:       Medication List        START taking these medications      acetaminophen 500 MG tablet  Commonly known as: TYLENOL  Take 1 tablet (500 mg total) by mouth every 6 (six) hours as needed for Pain.     ketorolac 10 mg tablet  Commonly known as: TORADOL  Take 1 tablet (10 mg total) by mouth every 6 (six) hours. for 4 days            CONTINUE taking these medications      atorvastatin 80 MG tablet  Commonly known as: LIPITOR  Take 1 tablet (80 mg total) by mouth every evening.     DULoxetine 30 MG capsule  Commonly known as: CYMBALTA     furosemide 20 MG tablet  Commonly known as: LASIX  Take 1 tablet (20 mg total) by mouth once daily.     metoprolol succinate 50 MG 24 hr tablet  Commonly known as: TOPROL-XL  Take 1 tablet (50 mg total) by mouth once daily.     NUCYNTA 50 mg Tab  Generic drug: tapentadoL     ramipriL 10 MG capsule  Commonly known as: ALTACE     tiZANidine 4 MG tablet  Commonly known as: ZANAFLEX     traMADoL 50 mg tablet  Commonly known as: ULTRAM            STOP taking these medications      aspirin 81 MG EC tablet  Commonly known as: ECOTRIN               Where to Get Your Medications        These medications were sent to [facility] [address] [place] [address]      Hours: Mon-Fri, 8a-5:30p  Phone: [phone]   acetaminophen 500 MG tablet  ketorolac 10 mg tablet         Indwelling Lines/Drains at time of discharge:   Lines/Drains/Airways       None                 Addendum   Placed under observation    Time spent on the discharge of patient: 59 minutes         Rudy Nicole MD  Department of Hospital Medicine  [facility] Surg

## 2022-11-10 NOTE — PLAN OF CARE
OT caroline completed. Max A x2 for bed mobility. Pt with significant pain in RLE. Unable to stand at this time. Recommends SNF.

## 2022-11-10 NOTE — DISCHARGE INSTRUCTIONS
Recommended patient to be compliant with ortho follow-up within 1 week upon discharge   Patient should be placed into a knee immobilizer and follow-up with Ortho Trauma Clinic in 7-10 days.  Recommended to follow up with primary care within 1 week upon discharge, follow-up with ortho for further evaluation and recommendation on resuming aspirin  Recommended to be compliant with home physical therapy, occupation therapy.    Recommended compliance with medications

## 2022-11-10 NOTE — PROCEDURES
"Leslie Wood is a 82 y.o. female patient.    Temp: 98.6 °F (37 °C) (11/09/22 2350)  Pulse: 96 (11/10/22 0943)  Resp: 20 (11/10/22 0752)  BP: (!) 195/100 (11/10/22 0943)  SpO2: 95 % (11/10/22 0633)  Weight: 63.5 kg (140 lb) (11/10/22 0108)  Height: 5' 1" (154.9 cm) (11/09/22 2350)       Arthrocentesis    Date/Time: 11/10/2022 10:28 AM  Location procedure was performed: [facility]  Performed by: Luis E Hdez PA-C  Authorized by: Luis E Hdez PA-C   Pre-op diagnosis: Right knee effusion  Post-op diagnosis: Right knee hemarthrosis  Indications: joint swelling, pain and diagnostic evaluation   Body area: knee  Joint: right knee  Local anesthesia used: no    Anesthesia:  Local anesthesia used: no    Patient sedated: no  Needle size: 18 G  Approach: Superolateral.  Aspirate amount: 64 mL  Aspirate: bloody  Patient tolerance: Patient tolerated the procedure well with no immediate complications  Comments: PROCEDURE:  I have explained the risks, benefits, and alternatives of the procedure in detail.  The patient voices understanding and all questions have been answered.  The patient agrees to proceed as planned. So after I performed a sterile prep of the skin in the normal fashion the right knee is aspirated using a 18 gauge needle.  64 cc of bloody fluid was obtained.  Patient tolerated the procedure well.         11/10/2022    "

## 2022-11-10 NOTE — HOSPITAL COURSE
11/10     Examination done at bedside, patient alert and oriented, stated improvement in pain after undergoing Ortho drainage;  Ortho evaluated and  stated that since the patient received corticosteroid injection just 6 weeks ago they recommended only aspirate the joint at this time.  Aspiration was performed and 64 cc of bloody fluid was removed.  Patient should be placed into a knee immobilizer and follow-up with Ortho Trauma Clinic in 7-10 days.  Patient is ready for discharge from orthopedic standpoint.  PT evaluated and recommended skilled nursing facility, however as per  patient does not qualify for facility at this time based on her insurance since patient does not meet 3 overnight criteria to stay in the hospital, discussed about the plan with the patient and daughter at bedside, they opted for home with home therapy- ordered home with home therapy accordingly.    Ordered pain medications, recommended on follow-up visits.    Labs reviewed;  Considering clinical and hemodynamic stability, planning to discharge patient today.    Patient and daughter agreed to the plan.    Charge nurse arranging on transport.    11/1/22 - Family and patient verbalized severe pain to fractured right knee, decreased function immobility. Pt desires discharge to Skilled Nursing Facility. Orthopedics plans for bracing the right knee with healing of the occult fracture. She will need a cardiology work up the then right total knee replaced. Have discussed that also with Dr Ibarra.  Since she had a intra-articular steroid injection done in September, likely no total knee replacement until late December or January.  consulted to assist with SNF placement. Pt will follow up with Ortho Trauma clinic in 7 to 10 days.

## 2022-11-10 NOTE — PLAN OF CARE
[facility] - Med Surg  Discharge Final Note    Primary Care Provider: Angeles Carson MD    Expected Discharge Date: 11/10/2022    Final Discharge Note (most recent)       Final Note - 11/10/22 1458          Final Note    Assessment Type Final Discharge Note     Anticipated Discharge Disposition [place] [facility]     Hospital Resources/Appts/Education Provided Provided patient/caregiver with written discharge plan information;Appointments scheduled and added to AVS        Post-Acute Status    Discharge Delays None known at this time                     Important Message from Medicare             Contact Info       [facility]   Specialty: Home Health Services    [address], [address]  [place] [address]   Phone: [phone]       Next Steps: Follow up    Instructions: Home Health, As needed          Patient to dc home with [facility]. Fu appt scheduled and added to AVS. No other cm needs.

## 2022-11-10 NOTE — CONSULTS
Cm spoke with Dr. Nicole regarding requirement for 3 inpatient midnights for patient to qualify for SNF. Per provider patient has no qualifying diagnosis for inpatient. Per dr Nicole she will speak with patient and family about dc home with HH.

## 2022-11-10 NOTE — H&P
[facility] - Emergency Dept[facility]  [facility] Medicine  History & Physical    Patient Name: Leslie Wood  MRN: [id number]  Patient Class: OP- Observation  Admission Date: 11/10/2022  Attending Physician: Rudy Nicole, *   Primary Care Provider: Angeles Carson MD         Patient information was obtained from patient and ER records.     Subjective:     Principal Problem:Hemarthrosis of right knee    Chief Complaint:   Chief Complaint   Patient presents with    Leg Pain     Increasing R leg pain from fall today, was seen in ER and d/c'd. Splint in place.         HPI: Leslie Wood is an 82-year-old female with past medical history significant for hypertension, hyperlipidemia, diabetes, vitamin-D deficiency, anxiety, chronic back pain, osteopenia, CVA, and stress-induced cardiomyopathy who is admitted for right knee pain and swelling following a possible unwitnessed fall.  She has severe arthritis of the right knee and is a patient of Dr. Kvng Cazares.  Patient was last seen by Olivia Garcia PA-C on 09/20/2022 and received corticosteroid injection to the right knee.  Patient's daughter is with her in the emergency department and reports that she was in the other room when the injury occurred.  She heard a loud crash and thinks that her mother either fell or walked into a closed door.  Patient complains of severe pain and swelling to the right knee.  Patient has a history of low back problems that radiates into lower extremities.  Patient denies change in her motor function or sensation at this time.  Patient takes aspirin, but she is not currently on any other blood thinners         Past Medical History:   Diagnosis Date    Arthritis     Cataract     GERD (gastroesophageal reflux disease)     Heart attack 05/18/2022    Heart attack 05/23/2022    Hyperlipidemia     Hypertension     Stroke        Past Surgical History:   Procedure Laterality Date    ADENOIDECTOMY      ADRENAL GLAND SURGERY      APPENDECTOMY      BACK  SURGERY      fusion l 4-5 s 1,2,3  fusion l 2-3    CORONARY ANGIOGRAPHY N/A 5/20/2022    Procedure: ANGIOGRAM, CORONARY ARTERY;  Surgeon: Domingo Martins MD;  Location: [facility] CATH LAB;  Service: Cardiology;  Laterality: N/A;    CORONARY ANGIOGRAPHY N/A 5/24/2022    Procedure: ANGIOGRAM, CORONARY ARTERY;  Surgeon: Domingo Martins MD;  Location: [facility] CATH LAB;  Service: Cardiology;  Laterality: N/A;    EYE SURGERY      HEMORRHOID SURGERY      HERNIA REPAIR      HYSTERECTOMY      indirect lumbar decompression      percutaneous placement of interspinous extension blocker    LEFT HEART CATHETERIZATION Left 5/20/2022    Procedure: CATHETERIZATION, HEART, LEFT;  Surgeon: Domingo Martins MD;  Location: [facility] CATH LAB;  Service: Cardiology;  Laterality: Left;    TONSILLECTOMY         Review of patient's allergies indicates:   Allergen Reactions    Prazosin Other (See Comments)     dizziness    Amitriptyline     Hydrochlorothiazide      Causes muscle cramping    Lisinopril      hyperkalemia    Percocet [oxycodone-acetaminophen] Other (See Comments)     Seizures    Belbuca [buprenorphine hcl] Nausea And Vomiting and Other (See Comments)     Black out     Codeine Nausea Only and Rash       No current facility-administered medications on file prior to encounter.     Current Outpatient Medications on File Prior to Encounter   Medication Sig    aspirin (ECOTRIN) 81 MG EC tablet Take 81 mg by mouth once daily.    atorvastatin (LIPITOR) 80 MG tablet Take 1 tablet (80 mg total) by mouth every evening.    DULoxetine (CYMBALTA) 30 MG capsule Take 30 mg by mouth once daily.    furosemide (LASIX) 20 MG tablet Take 1 tablet (20 mg total) by mouth once daily.    metoprolol succinate (TOPROL-XL) 50 MG 24 hr tablet Take 1 tablet (50 mg total) by mouth once daily.    NUCYNTA 50 mg Tab Take 50 mg by mouth every 8 (eight) hours as needed.    ramipriL (ALTACE) 10 MG capsule Take 10 mg by mouth once daily.    tiZANidine (ZANAFLEX) 4 MG tablet  Take 4 mg by mouth once daily.    traMADoL (ULTRAM) 50 mg tablet Take 50 mg by mouth every 12 (twelve) hours as needed.    [DISCONTINUED] carvediloL (COREG) 6.25 MG tablet Take 1 tablet (6.25 mg total) by mouth 2 (two) times daily. TAKE (1) TABLET BY MOUTH 2 TIMES A DAY WITH MEALS    [DISCONTINUED] cloNIDine (CATAPRES) 0.1 MG tablet Take 1 tablet (0.1 mg total) by mouth 2 (two) times daily. To take an extra dose if BP >160/90    [DISCONTINUED] clopidogreL (PLAVIX) 75 mg tablet Take 1 tablet (75 mg total) by mouth once daily.    [DISCONTINUED] diclofenac sodium (VOLTAREN) 1 % Gel Apply 2 g topically 3 (three) times daily.    [DISCONTINUED] isosorbide mononitrate (IMDUR) 30 MG 24 hr tablet TAKE 1 TABLET BY MOUTH TWICE A DAY    [DISCONTINUED] metoprolol tartrate (LOPRESSOR) 25 MG tablet Take 1 tablet (25 mg total) by mouth 3 (three) times daily.    [DISCONTINUED] nitroGLYCERIN (NITROSTAT) 0.4 MG SL tablet Place 1 tablet (0.4 mg total) under the tongue every 5 (five) minutes as needed for Chest pain.    [DISCONTINUED] valsartan (DIOVAN) 160 MG tablet TAKE 1 TABLET BY MOUTH TWICE A DAY     Family History       Problem Relation (Age of Onset)    Breast cancer Sister    Diabetes Mother    Heart disease Mother    Hypertension Mother, Father    Kidney disease Father          Tobacco Use    Smoking status: Never    Smokeless tobacco: Never   Substance and Sexual Activity    Alcohol use: No    Drug use: No    Sexual activity: Not Currently     Review of Systems  Constitutional: Negative for chills, decreased appetite, fever and weight loss.   HENT:  Negative for congestion, hoarse voice and sore throat.    Eyes:  Negative for blurred vision, double vision, vision loss in left eye and vision loss in right eye.   Cardiovascular:  Negative for chest pain, palpitations and syncope.   Respiratory:  Negative for cough, shortness of breath and wheezing.    Endocrine: Negative for cold intolerance and heat intolerance.    Hematologic/Lymphatic: Negative for bleeding problem. Does not bruise/bleed easily.   Skin:  Negative for dry skin, flushing, itching and suspicious lesions.   Musculoskeletal:  Positive for arthritis, back pain, falls, joint pain, joint swelling and stiffness.   Gastrointestinal:  Negative for abdominal pain, diarrhea, nausea and vomiting.   Genitourinary:  Negative for dysuria, frequency and urgency.   Neurological:  Negative for dizziness, headaches, numbness and paresthesias.   Psychiatric/Behavioral:  Negative for altered mental status and memory loss.   Objective:     Vital Signs (Most Recent):  Temp: 98.6 °F (37 °C) (11/09/22 2350)  Pulse: 86 (11/10/22 1030)  Resp: 20 (11/10/22 1121)  BP: (!) 168/96 (11/10/22 1030)  SpO2: 95 % (11/10/22 1030)   Vital Signs (24h Range):  Temp:  [98.6 °F (37 °C)] 98.6 °F (37 °C)  Pulse:  [82-96] 86  Resp:  [17-23] 20  SpO2:  [94 %-98 %] 95 %  BP: (111-195)/() 168/96     Weight: 63.5 kg (140 lb)  Body mass index is 26.45 kg/m².    Physical Exam     Constitutional: Patient is in mild distress due to pain. Well-developed and well-nourished.  Head: Normocephalic. Large regions of ecchymosis on the face, concentrated to right side   Eyes: EOM intact. Conjunctivae are not pale. No scleral icterus.  ENT: Mucous membranes are moist. Oropharynx is clear and symmetric.    Neck: Supple. Full ROM.   Cardiovascular: Regular rate. Regular rhythm. No murmurs, rubs, or gallops. Distal pulses are 2+ and symmetric.  Pulmonary/Chest: No respiratory distress. Clear to auscultation bilaterally. No wheezing or rales.  Abdominal: Soft and non-distended.    No rebound, guarding, or rigidity.   Musculoskeletal:  Thoracic, lumbar, midline tenderness. No c spine tenderness. No edema. Old and new regions of ecchymosis on bruising on both arms.  Right leg: moderately sized joint effusion in the knee, patella non tender, medial and lateral joint tenderness.   Right hand and shoulder have large  regions of ecchymosis.  Right hip tender.  Skin: Warm and dry. On the left 5th digit there is a visible splinter extending from the tip of the fingernail bed to the base of the fingernail bed.   Neurological:  Alert, awake, and appropriate.  Normal speech.  No acute focal neurological deficits are appreciated. Limited right knee exam given pain, but patient is able to wiggle toes and move her ankle appropriately.   Psychiatric: Normal affect. Good eye contact. Appropriate in content.    Significant Labs:     Results for orders placed or performed during the hospital encounter of 11/10/22   CBC auto differential   Result Value Ref Range    WBC 12.37 3.90 - 12.70 K/uL    RBC 4.45 4.00 - 5.40 M/uL    Hemoglobin 13.1 12.0 - 16.0 g/dL    Hematocrit 39.0 37.0 - 48.5 %    MCV 88 82 - 98 fL    MCH 29.4 27.0 - 31.0 pg    MCHC 33.6 32.0 - 36.0 g/dL    RDW 14.3 11.5 - 14.5 %    Platelets 224 150 - 450 K/uL    MPV 9.7 9.2 - 12.9 fL    Immature Granulocytes 0.3 0.0 - 0.5 %    Gran # (ANC) 9.4 (H) 1.8 - 7.7 K/uL    Immature Grans (Abs) 0.04 0.00 - 0.04 K/uL    Lymph # 1.4 1.0 - 4.8 K/uL    Mono # 1.5 (H) 0.3 - 1.0 K/uL    Eos # 0.0 0.0 - 0.5 K/uL    Baso # 0.03 0.00 - 0.20 K/uL    nRBC 0 0 /100 WBC    Gran % 75.9 (H) 38.0 - 73.0 %    Lymph % 11.5 (L) 18.0 - 48.0 %    Mono % 12.0 4.0 - 15.0 %    Eosinophil % 0.1 0.0 - 8.0 %    Basophil % 0.2 0.0 - 1.9 %    Differential Method Automated    Comprehensive metabolic panel   Result Value Ref Range    Sodium 142 136 - 145 mmol/L    Potassium 4.2 3.5 - 5.1 mmol/L    Chloride 108 95 - 110 mmol/L    CO2 22 (L) 23 - 29 mmol/L    Glucose 174 (H) 70 - 110 mg/dL    BUN 17 8 - 23 mg/dL    Creatinine 0.8 0.5 - 1.4 mg/dL    Calcium 9.8 8.7 - 10.5 mg/dL    Total Protein 6.5 6.0 - 8.4 g/dL    Albumin 3.8 3.5 - 5.2 g/dL    Total Bilirubin 1.1 (H) 0.1 - 1.0 mg/dL    Alkaline Phosphatase 92 55 - 135 U/L    AST 39 10 - 40 U/L    ALT 53 (H) 10 - 44 U/L    Anion Gap 12 8 - 16 mmol/L    eGFR >60 >60  mL/min/1.73 m^2        Significant Imaging:     Imaging Results              X-Ray Hip 2 or 3 views Right (with Pelvis when performed) (Final result)  Result time 11/10/22 07:39:24      Final result by Floyd Martinez MD (11/10/22 07:39:24)                   Impression:      No acute abnormality identified.      Electronically signed by: Floyd Martinez  Date:    11/10/2022  Time:    07:39               Narrative:    EXAMINATION:  XR HIP WITH PELVIS WHEN PERFORMED, 2 OR 3  VIEWS RIGHT    CLINICAL HISTORY:  Right hip deformity;    TECHNIQUE:  AP view of the pelvis and frog leg lateral view of the left hip were performed.    COMPARISON:  None    FINDINGS:  No evidence of fracture or dislocation.  Sacroiliac joints unremarkable.  Hip joints appear well maintained.  Soft tissues unremarkable.                        ED Interpretation by Romulo Harris MD (11/10/22 01:41:57, [facility] - Emergency Dept., Emergency Medicine)    No fracture or dislocation.                                      X-Ray Knee 3 View Right (Final result)  Result time 11/10/22 07:41:06      Final result by Floyd Martinez MD (11/10/22 07:41:06)                   Impression:      No acute osseous abnormality identified. Large suprapatellar knee joint effusion.      Electronically signed by: Floyd Martinez  Date:    11/10/2022  Time:    07:41               Narrative:    EXAMINATION:  XR KNEE 3 VIEW RIGHT    CLINICAL HISTORY:  Pain in right knee    TECHNIQUE:  AP, lateral, and Merchant views of the right knee were performed.    COMPARISON:  11/09/2022    FINDINGS:  No evidence of fracture or dislocation.  Large suprapatellar knee joint effusion.  Lateral translation of the tibia relative to the femur.  Bulky tricompartmental osteophytes.  Moderate to severe nearly bone-on-bone joint space narrowing of the medial and lateral compartments.  Bones appear osteopenic.                        ED Interpretation by Romulo Harris MD (11/10/22 01:41:22, [facility] - Emergency  Dept., Emergency Medicine)    Knee effusion. Arthritis. No Fx or dislocation                                     Assessment/Plan:     * Hemarthrosis of right knee  ortho evaluated and aspiration was done.    Aspirin withheld given hemarthrosis, significant bruises throughout the body.        Effusion of right knee  Ortho eval and Aspiration was performed and 64 cc of bloody fluid was removed.    Patient should be placed into a knee immobilizer and follow-up with [facility] in 7-10 days.   Cleared for discharge from ortho standpoint.    PT OT evaluated and recommended skilled nursing place facility placement, follow-up with .          Essential hypertension  Resume home meds          VTE Risk Mitigation (From admission, onward)           Ordered     IP VTE HIGH RISK PATIENT  Once         11/10/22 1128     Place sequential compression device  Until discontinued         11/10/22 1128                   Addendum: placed under observation;        Rudy Nicole MD  Department of Hospital Medicine   '[place] - Emergency Dept.

## 2022-11-10 NOTE — PHARMACY MED REC
"Admission Medication History     The home medication history was taken by Yvan Barragan.    You may go to "Admission" then "Reconcile Home Medications" tabs to review and/or act upon these items.     The home medication list has been updated by the Pharmacy department.   Please read ALL comments highlighted in yellow.   Please address this information as you see fit.    Feel free to contact us if you have any questions or require assistance.        Medications listed below were obtained from: Analytic software- [employer] and Medical records  (Not in a hospital admission)      Yvan Barragan  [id number]    Current Outpatient Medications on File Prior to Encounter   Medication Sig Dispense Refill Last Dose    aspirin (ECOTRIN) 81 MG EC tablet Take 81 mg by mouth once daily.       atorvastatin (LIPITOR) 80 MG tablet Take 1 tablet (80 mg total) by mouth every evening. 90 tablet 3     DULoxetine (CYMBALTA) 30 MG capsule Take 30 mg by mouth once daily.       furosemide (LASIX) 20 MG tablet Take 1 tablet (20 mg total) by mouth once daily. 30 tablet 11     metoprolol succinate (TOPROL-XL) 50 MG 24 hr tablet Take 1 tablet (50 mg total) by mouth once daily. 30 tablet 11     NUCYNTA 50 mg Tab Take 50 mg by mouth every 8 (eight) hours as needed.       ramipriL (ALTACE) 10 MG capsule Take 10 mg by mouth once daily.       tiZANidine (ZANAFLEX) 4 MG tablet Take 4 mg by mouth once daily.       traMADoL (ULTRAM) 50 mg tablet Take 50 mg by mouth every 12 (twelve) hours as needed.                              .        "

## 2022-11-11 ENCOUNTER — PATIENT OUTREACH (OUTPATIENT)
Dept: ADMINISTRATIVE | Facility: HOSPITAL | Age: 82
End: 2022-11-11
Payer: MEDICARE

## 2022-11-11 PROCEDURE — 11000001 HC ACUTE MED/SURG PRIVATE ROOM

## 2022-11-11 PROCEDURE — 25000003 PHARM REV CODE 250: Performed by: STUDENT IN AN ORGANIZED HEALTH CARE EDUCATION/TRAINING PROGRAM

## 2022-11-11 PROCEDURE — 97530 THERAPEUTIC ACTIVITIES: CPT

## 2022-11-11 PROCEDURE — 97530 THERAPEUTIC ACTIVITIES: CPT | Mod: CQ

## 2022-11-11 PROCEDURE — 25000003 PHARM REV CODE 250: Performed by: FAMILY MEDICINE

## 2022-11-11 RX ADMIN — HYDROCODONE BITARTRATE AND ACETAMINOPHEN 1 TABLET: 7.5; 325 TABLET ORAL at 09:11

## 2022-11-11 RX ADMIN — METOPROLOL SUCCINATE 50 MG: 50 TABLET, EXTENDED RELEASE ORAL at 09:11

## 2022-11-11 RX ADMIN — DULOXETINE 30 MG: 30 CAPSULE, DELAYED RELEASE ORAL at 09:11

## 2022-11-11 RX ADMIN — HYDROCODONE BITARTRATE AND ACETAMINOPHEN 1 TABLET: 7.5; 325 TABLET ORAL at 06:11

## 2022-11-11 RX ADMIN — AMLODIPINE BESYLATE 5 MG: 5 TABLET ORAL at 09:11

## 2022-11-11 RX ADMIN — KETOROLAC TROMETHAMINE 10 MG: 10 TABLET, FILM COATED ORAL at 12:11

## 2022-11-11 NOTE — PT/OT/SLP PROGRESS
Physical Therapy  Treatment    Leslie Wood   MRN: [id number]   Admitting Diagnosis: Hemarthrosis of right knee    PT Received On: 11/11/22  PT Start Time: 1335     PT Stop Time: 1420    PT Total Time (min): 45 min       Billable Minutes:  Therapeutic Activity 45    Treatment Type: Treatment  PT/PTA: PTA     PTA Visit Number: 1       General Precautions: Standard, fall  Orthopedic Precautions: RLE weight bearing as tolerated (MUST HAVE KNEE IMMOBILIZER DONNED)   Braces: Knee immobilizer  Respiratory Status: Room air         Subjective:  Communicated with patient's nurse and completed Epic chart review prior to session.  Patient agreed to PT session with encouragement from therapist and family members.   Noted some confusion with patient having difficulty completing thoughts and remaining on topic.     Pain/Comfort  Pain Rating 1: 10/10 (WITH MOVEMENT)  Location - Side 1: Right  Location - Orientation 1: generalized  Location 1: leg  Pain Addressed 1: Other (see comments), Reposition, Distraction (ACTIVITY PACING; KNEE IMMOBILIZER DONNED FOR SUPPORT)    Objective:   Patient found with: peripheral IV, PureWick    Functional Mobility:  Therapist discussed with patient's family MD request for brace to be utilized during mobility and weight bearing in attempt to stabilize R knee. R knee immobilizer was present in room but patient had been previously unable to tolerate it. Attempted to utilize this brace again today and patient was able to tolerate it well throughout session and requested to leave it on after session was completed.     Supine > sit EOB: Total A of 2 (moved patient towards R side of bed; increased time to complete; c/o increased pain throughout 95% of transition)    Seated EOB x15 min total focusing on increased tolerance to upright position, core stability, trunk control and CV endurance. Maintained sitting balance with BUE self support and CGA to Min A. With increased time, patient was able to slowly  allow R leg to be lowered to the floor with KI donned.    STS from EOB x2 trials: Mod A (VC for hand placement and sequencing of task)    Stand x2 min w/ BUE support on bed rails. Min-Mod A to maintain standing balance. RLE WBAT w/ KI donned. ~4 reps of R/L lateral weight shifting in prep for stepping with Max A and consistent VC for sequencing of task    Stand x2 min w/ BUE support on RW: Min A to maintain standing balance (VC to maintain static standing position and not attempt movement/stepping until instructed to do so)    Side stepping (~4 steps) w/ RW towards HOB: Max A of 2    Sit > supine: Max A of 2   **Patient experienced significantly less discomfort during this transition. Supported RLE under knee and at ankle while encouraging her to give active effort to move RLE. Patient demonstrated good muscle strength to return RLE into bed with assistance.     Supine scoot towards HOB: Total A of 2    Educated patient on importance of increased tolerance to upright position and direct impact on CV endurance and strength. Patient encouraged to utilize elevated HOB to simulate chair position until able to safely complete chair T/F. Patient given a minimum goal of majority of the day to be spent in upright, especially with all meals. Encouraged patient to perform AROM TE to BLE throughout the day within all available planes of motion. Re enforced importance of utilizing call light to meet needs in room and not attempt to get up without staff assistance. Patient verbalized understanding and agreed to comply.     AM-PAC 6 CLICK MOBILITY  How much help from another person does this patient currently need?   1 = Unable, Total/Dependent Assistance  2 = A lot, Maximum/Moderate Assistance  3 = A little, Minimum/Contact Guard/Supervision  4 = None, Modified [place]/Independent    Turning over in bed (including adjusting bedclothes, sheets and blankets)?: 2  Sitting down on and standing up from a chair with arms (e.g.,  wheelchair, bedside commode, etc.): 2  Moving from lying on back to sitting on the side of the bed?: 2  Moving to and from a bed to a chair (including a wheelchair)?: 1  Need to walk in hospital room?: 1  Climbing 3-5 steps with a railing?: 1  Basic Mobility Total Score: 9    AM-PAC Raw Score CMS G-Code Modifier Level of Impairment Assistance   6 % Total / Unable   7 - 9 CM 80 - 100% Maximal Assist   10 - 14 CL 60 - 80% Moderate Assist   15 - 19 CK 40 - 60% Moderate Assist   20 - 22 CJ 20 - 40% Minimal Assist   23 CI 1-20% SBA / CGA   24 CH 0% Independent/ Mod I     Patient left HOB elevated with call button in reach, bed alarm on, and family members present.    Assessment:  Leslie Wood is a 82 y.o. female with a medical diagnosis of Hemarthrosis of right knee and presents with overall decline in functional mobility. Patient would continue to benefit from skilled PT to address functional limitations listed below in order to return to PLOF/decrease caregiver burden. Patient gave good active effort and participation throughout session today. Remains significantly limited by pain but was able to tolerate knee immobilizer today which allowed for improved R knee stability.     Rehab identified problem list/impairments: Rehab identified problem list/impairments: weakness, impaired endurance, impaired sensation, impaired self care skills, impaired functional mobility, gait instability, impaired balance, decreased coordination, impaired cognition, decreased upper extremity function, decreased lower extremity function, decreased safety awareness, pain, decreased ROM, impaired coordination, edema, impaired cardiopulmonary response to activity    Rehab potential is fair.    Activity tolerance: Fair    Discharge recommendations: Discharge Facility/Level of Care Needs: nursing facility, skilled     Barriers to discharge:      Equipment recommendations: Equipment Needed After Discharge: other (see comments) (TBD BY  NEXT LEVEL OF CARE)     GOALS:   Multidisciplinary Problems       Physical Therapy Goals          Problem: Physical Therapy    Goal Priority Disciplines Outcome Goal Variances Interventions   Physical Therapy Goal     PT, PT/OT      Description: LT22  1. PT WILL COMPLETE BED MOBILITY WITH MIN A  2. PT WILL STAND T/F TO CHAIR WITH RW WBAT WITH RW AND MAX A  3. PT WILL COMPLETE B LE X 10 REPS TO FACILITATE INC AROM AND STRENGTHENING.                        PLAN:    Patient to be seen 3 x/week  to address the above listed problems via gait training, therapeutic activities, therapeutic exercises  Plan of Care expires: 22  Plan of Care reviewed with: patient         2022

## 2022-11-11 NOTE — CONSULTS
CM met with patient and her daughter at the bedside to discuss SNF placement. Cm provided SNF list for review, pending accepting SNF.

## 2022-11-11 NOTE — PLAN OF CARE
Called to bedside to address family concerns. Patient unable to ambulate safely due to pain. Knee immobilizer ordered. Patient may benefit from snf. DC orders discontinued.

## 2022-11-11 NOTE — PROGRESS NOTES
[facility] [place] [facility]  Progress Note    Patient Name: Leslie Wood  MRN: [id number]  Patient Class: IP- Inpatient   Admission Date: 11/10/2022  Length of Stay: 0 days  Attending Physician: Rudy Nicole, *  Primary Care Provider: Angeles Carson MD        Subjective:     Principal Problem:Hemarthrosis of right knee        HPI:  Leslie Wood is an 82-year-old female with past medical history significant for hypertension, hyperlipidemia, diabetes, vitamin-D deficiency, anxiety, chronic back pain, osteopenia, CVA, and stress-induced cardiomyopathy who is admitted for right knee pain and swelling following a possible unwitnessed fall.  She has severe arthritis of the right knee and is a patient of Dr. Kvng Cazares.  Patient was last seen by Olivia Garcia PA-C on 09/20/2022 and received corticosteroid injection to the right knee.  Patient's daughter is with her in the emergency department and reports that she was in the other room when the injury occurred.  She heard a loud crash and thinks that her mother either fell or walked into a closed door.  Patient complains of severe pain and swelling to the right knee.  Patient has a history of low back problems that radiates into lower extremities.  Patient denies change in her motor function or sensation at this time.  Patient takes aspirin, but she is not currently on any other blood thinners         Overview/Hospital Course:  11/10     Examination done at bedside, patient alert and oriented, stated improvement in pain after undergoing Ortho drainage;  Ortho evaluated and  stated that since the patient received corticosteroid injection just 6 weeks ago they recommended only aspirate the joint at this time.  Aspiration was performed and 64 cc of bloody fluid was removed.  Patient should be placed into a knee immobilizer and follow-up with Orth[facility] Trauma Clinic in 7-10 days.  Patient is ready for discharge from orthopedic standpoint.  PT evaluated and  recommended skilled nursing facility, however as per  patient does not qualify for facility at this time based on her insurance since patient does not meet 3 overnight criteria to stay in the hospital, discussed about the plan with the patient and daughter at bedside, they opted for home with home therapy- ordered home with home therapy accordingly.    Ordered pain medications, recommended on follow-up visits.    Labs reviewed;  Considering clinical and hemodynamic stability, planning to discharge patient today.    Patient and daughter agreed to the plan.    Charge nurse arranging on transport.    11/1/22 - Family and patient verbalized severe pain to fractured right knee, decreased function immobility. Pt desires discharge to Skilled Nursing Facility. Orthopedics plans for bracing the right knee with healing of the occult fracture. She will need a cardiology work up the then right total knee replaced. Have discussed that also with Dr Ibarra.  Since she had a intra-articular steroid injection done in September, likely no total knee replacement until late December or January.  consulted to assist with SNF placement. Pt will follow up with Ortho Trauma clinic in 7 to 10 days.       Interval History: See Hospital Course    Review of Systems   Constitutional:  Positive for activity change. Negative for chills, fatigue and fever.   HENT: Negative.     Respiratory:  Negative for cough and shortness of breath.    Cardiovascular:  Positive for leg swelling. Negative for chest pain.   Gastrointestinal:  Negative for abdominal pain, diarrhea, nausea and vomiting.   Genitourinary: Negative.    Musculoskeletal:  Positive for arthralgias, gait problem and myalgias.   Skin:  Positive for color change.        Multiple ecchymosis to right face   Psychiatric/Behavioral: Negative.     Objective:     Vital Signs (Most Recent):  Temp: 98.6 °F (37 °C) (11/11/22 1231)  Pulse: 67 (11/11/22 1231)  Resp: 17  (11/11/22 1231)  BP: (!) 110/51 (11/11/22 1231)  SpO2: (!) 94 % (11/11/22 1231)   Vital Signs (24h Range):  Temp:  [97.8 °F (36.6 °C)-98.7 °F (37.1 °C)] 98.6 °F (37 °C)  Pulse:  [62-74] 67  Resp:  [16-18] 17  SpO2:  [92 %-97 %] 94 %  BP: (110-175)/() 110/51     Weight: 56.7 kg (125 lb)  Body mass index is 23.62 kg/m².    Intake/Output Summary (Last 24 hours) at 11/11/2022 1231  Last data filed at 11/11/2022 0906  Gross per 24 hour   Intake 240 ml   Output 200 ml   Net 40 ml      Physical Exam  Vitals reviewed.   HENT:      Head: Normocephalic.      Nose: Nose normal.   Eyes:      Conjunctiva/sclera: Conjunctivae normal.   Cardiovascular:      Rate and Rhythm: Normal rate and regular rhythm.   Pulmonary:      Effort: Pulmonary effort is normal.      Breath sounds: Normal breath sounds.   Abdominal:      Palpations: Abdomen is soft.   Musculoskeletal:      Cervical back: Normal range of motion and neck supple.      Right knee: Swelling, ecchymosis and bony tenderness present. No deformity. Decreased range of motion.   Skin:     Comments: Multiple bruises to the right face and right upper arm   Neurological:      General: No focal deficit present.      Mental Status: She is alert and oriented to person, place, and time.   Psychiatric:         Thought Content: Thought content normal.       Significant Labs: All pertinent labs within the past 24 hours have been reviewed.  CBC:   Recent Labs   Lab 11/10/22  0248   WBC 12.37   HGB 13.1   HCT 39.0        CMP:   Recent Labs   Lab 11/10/22  0248      K 4.2      CO2 22*   *   BUN 17   CREATININE 0.8   CALCIUM 9.8   PROT 6.5   ALBUMIN 3.8   BILITOT 1.1*   ALKPHOS 92   AST 39   ALT 53*   ANIONGAP 12       Significant Imaging: I have reviewed all pertinent imaging results/findings within the past 24 hours.      Assessment/Plan:      * Hemarthrosis of right knee  ortho evaluated and aspiration was done.    Aspirin withheld given hemarthrosis,  significant bruises throughout the body.        Intractable pain  Prn analgesia      Effusion of right knee  Ortho eval and Aspiration was performed and 64 cc of bloody fluid was removed.    Patient should be placed into a knee immobilizer and follow-up with Ortho Trauma Clinic in 7-10 days.   Cleared for discharge from ortho standpoint.    PT OT evaluated and recommended skilled nursing place facility placement, follow-up with .          Essential hypertension  Resume home meds  controlled        VTE Risk Mitigation (From admission, onward)         Ordered     IP VTE HIGH RISK PATIENT  Once         11/10/22 1128     Place sequential compression device  Until discontinued         11/10/22 1128                Discharge Planning   JOSE: 11/10/2022     Code Status: Full Code   Is the patient medically ready for discharge?:     Reason for patient still in hospital (select all that apply): Treatment and Pending disposition  Discharge Plan A: Home Health   Discharge Delays: None known at this time              Urvashi Espinoza NP  Department of Hospital Medicine   [facility] - Med S[facility]

## 2022-11-11 NOTE — PT/OT/SLP PROGRESS
Occupational Therapy  Treatment    Leslie Wood   MRN: [id number]   Admitting Diagnosis: Hemarthrosis of right knee    OT Date of Treatment: 11/11/22   OT Start Time: 1345  OT Stop Time: 1423  OT Total Time (min): 38 min    Billable Minutes:  Therapeutic Activity 38 minuites    OT/RAJWINDER: OT          General Precautions: Standard, fall  Orthopedic Precautions: RLE weight bearing as tolerated  Braces: Knee immobilizer  Respiratory Status: Room air         Subjective:  Communicated with nurse and epic chart review prior to session.    Pain/Comfort  Pain Rating 1: 10/10  Location - Side 1: Right  Location - Orientation 1: generalized  Location 1: leg    Objective:  Patient found with: telemetry, blood pressure cuff, pulse ox (continuous), peripheral IV, PureWick     Functional Mobility:  Bed Mobility:  Total  a x 2 with log rolling  Total a x2  with supine<sit  max a x 2 with forward scooting  Total a with supine scooting higher in bed    Transfers:   Mod a x 2 with sit<>stand transfer    Functional Ambulation: pt ambulated 4 steps with rolling walker and max a x 2    Activities of Daily Living:     Balance:   Static Sit: POOR+: Needs MINIMAL assist to maintain  Dynamic Sit: POOR: N/A  Static Stand: 0: Needs MAXIMAL assist to maintain   Dynamic stand: 0: N/A    Therapeutic Activities and Exercises:  Patient educated on role of OT in acute setting and benefits of participation. Educated on techniques to use to increase independence and decrease fall risk with functional transfers. Educated on importance of  sitting in chair position whil in bedctivity and calling for A to transfer back to bed. Encouraged completion of B UE AROM therex throughout the day to tolerance to increase functional strength and activity tolerance. Patient stated understanding and in agreement with POC.      AM-PAC 6 CLICK ADL   How much help from another person does this patient currently need?   1 = Unable, Total/Dependent Assistance  2 = A lot,  "Maximum/Moderate Assistance  3 = A little, Minimum/Contact Guard/Supervision  4 = None, Modified [place]/Independent    Putting on and taking off regular lower body clothing? : 1  Bathing (including washing, rinsing, drying)?: 2  Toileting, which includes using toilet, bedpan, or urinal? : 2 (purewick placement)  Putting on and taking off regular upper body clothing?: 3  Taking care of personal grooming such as brushing teeth?: 4  Eating meals?: 4  Daily Activity Total Score: 16     AM-PAC Raw Score CMS "G-Code Modifier Level of Impairment Assistance   6 % Total / Unable   7 - 8 CM 80 - 100% Maximal Assist   9-13 CL 60 - 80% Moderate Assist   14 - 19 CK 40 - 60% Moderate Assist   20 - 22 CJ 20 - 40% Minimal Assist   23 CI 1-20% SBA / CGA   24 CH 0% Independent/ Mod I       Patient left up in chair with nurse    ASSESSMENT:  Leslie Wood is a 82 y.o. female with a medical diagnosis of Hemarthrosis of right knee and presents with debility and generalized weakmness.    Rehab identified problem list/impairments: Rehab identified problem list/impairments: weakness, decreased upper extremity function, decreased ROM, gait instability, impaired endurance, impaired balance, decreased lower extremity function, impaired self care skills, impaired functional mobility, decreased coordination, decreased safety awareness    Rehab potential is good.    Activity tolerance: Good    Discharge recommendations: Discharge Facility/Level of Care Needs: nursing facility, skilled     Barriers to discharge:      Equipment recommendations: none     GOALS:   Multidisciplinary Problems       Occupational Therapy Goals          Problem: Occupational Therapy    Goal Priority Disciplines Outcome Interventions   Occupational Therapy Goal     OT, PT/OT Ongoing, Progressing    Description: Goals to be met by: 11/24/22     Patient will increase functional independence with ADLs by performing:    Toileting from bedside commode with " Minimal Assistance for hygiene and clothing management.   Stand pivot transfers with Moderate Assistance with RW.  Upper extremity exercise program x20 reps per handout, with assistance as needed.                         Plan:  Patient to be seen 2 x/week to address the above listed problems via self-care/home management, therapeutic activities, therapeutic exercises  Plan of Care expires: 11/24/22  Plan of Care reviewed with: patient         11/11/2022

## 2022-11-11 NOTE — PLAN OF CARE
Bed locked, in lowest position. Call bell in reach. Purposeful rounding done every two hours. Chart check complete. Will continue with plan of care.

## 2022-11-11 NOTE — PROGRESS NOTES
'[facility]  Orthopedics  Progress Note    Patient Name: Leslie Wood  MRN: [id number]  Admission Date: 11/10/2022  Hospital Length of Stay: 0 days  Attending Provider: Rudy Nicole, *  Primary Care Provider: Angeles Carson MD    Subjective:     Principal Problem:Hemarthrosis of right knee    Principal Orthopedic Problem:  Hemarthrosis of right knee    Interval History: Leslie Wood is an 82-year-old female who is admitted following right knee injury.  She has a history of severe osteoarthritis of the right knee she suffered an injury just prior to arrival in the emergency department.  She is experiencing severe swelling and pain of the right knee.  Knee was drained by me yesterday in the emergency department, but the swelling has returned.  Patient reports the pain is slightly improved from yesterday    Review of patient's allergies indicates:   Allergen Reactions    Prazosin Other (See Comments)     dizziness    Amitriptyline     Hydrochlorothiazide      Causes muscle cramping    Lisinopril      hyperkalemia    Percocet [oxycodone-acetaminophen] Other (See Comments)     Seizures    Belbuca [buprenorphine hcl] Nausea And Vomiting and Other (See Comments)     Black out     Codeine Nausea Only and Rash       Current Facility-Administered Medications   Medication    acetaminophen tablet 650 mg    amLODIPine tablet 5 mg    dextrose 10% bolus 125 mL    dextrose 10% bolus 250 mL    DULoxetine DR capsule 30 mg    glucagon (human recombinant) injection 1 mg    glucose chewable tablet 16 g    glucose chewable tablet 24 g    HYDROcodone-acetaminophen 7.5-325 mg per tablet 1 tablet    ketorolac tablet 10 mg    metoprolol succinate (TOPROL-XL) 24 hr tablet 50 mg    morphine injection 4 mg    naloxone injection 0.02 mg    ondansetron tablet 4 mg    sodium chloride 0.9% flush 10 mL     Objective:     Vital Signs (Most Recent):  Temp: 97.8 °F (36.6 °C) (11/11/22 0421)  Pulse: 69 (11/11/22 0421)  Resp: 18 (11/11/22  "0421)  BP: 129/78 (11/11/22 0421)  SpO2: (!) 92 % (11/11/22 0421)   Vital Signs (24h Range):  Temp:  [97.8 °F (36.6 °C)-98.7 °F (37.1 °C)] 97.8 °F (36.6 °C)  Pulse:  [62-96] 69  Resp:  [16-23] 18  SpO2:  [92 %-97 %] 92 %  BP: (116-195)/() 129/78     Weight: 56.7 kg (125 lb)  Height: 5' 1" (154.9 cm)  Body mass index is 23.62 kg/m².      Intake/Output Summary (Last 24 hours) at 11/11/2022 0807  Last data filed at 11/11/2022 0045  Gross per 24 hour   Intake --   Output 200 ml   Net -200 ml       Ortho/SPM Exam  Right knee:  Skin is intact   Large effusion  Minimal edema of the surrounding tissues   TTP diffusely throughout the knee   Patient is unable to flex or extend the knee secondary to pain   Calf compartments are soft and compressible  Motor exam normal   Sensation and pulses intact   Cap refill brisk     GEN: Well developed, well nourished female. AAOX3. No acute distress.   Head: Normocephalic, atraumatic.   Eyes: HENRI  Neck: Trachea is midline, no adenopathy  Resp: Breathing unlabored.  Neuro: Motor function normal, Cranial nerves intact  Psych: Mood and affect appropriate.      Significant Labs:   Recent Lab Results       None          All pertinent labs within the past 24 hours have been reviewed.    Significant Imaging: I have reviewed and interpreted all pertinent imaging results/findings.    Assessment/Plan:     Active Diagnoses:    Diagnosis Date Noted POA    PRINCIPAL PROBLEM:  Hemarthrosis of right knee [M25.061] 11/10/2022 Unknown    Effusion of right knee [M25.461] 11/10/2022 Unknown    Intractable pain [R52] 11/10/2022 Unknown    Essential hypertension [I10] 07/30/2015 Yes     Chronic      Problems Resolved During this Admission:     Assessment:  82-year-old female with past medical history significant for hypertension, hyperlipidemia, diabetes, vitamin-D deficiency, anxiety, chronic back pain, osteopenia, CVA, and stress-induced cardiomyopathy is admitted for right knee pain with " hemarthrosis from arthritic knee vs possible occult fracture    Plan:   We discussed repeat aspiration, but at this time we expect that the swelling would return, so we will hold off on that at this time.  Patient will follow-up with Ortho Trauma Clinic in about 7-10 days for further evaluation.  We have consulted with Dr. Ibarra on this patient and may refer the patient to him for right TKA if she would like that when she comes to clinic    Luis E Hdez PA-C  Orthopedics  [facility]

## 2022-11-11 NOTE — PLAN OF CARE
Patient is stable. Knee brace in place. Pain control with pain meds. Glucose monitoring. No signs or symptoms of acute distress at this time. Call light in reach. Chart reviewed.

## 2022-11-11 NOTE — NURSING
- education provided regarding discharge packet.  - IV removed   - Awaiting family transportation.

## 2022-11-11 NOTE — PROGRESS NOTES
Called patient to confirm hospital follow up scheduled on 11/14/2022.   Patient is not confirmed.     Spoke with her daughter Mirta, said she is in the hospital. She was supposed to be discharged yesterday, but was unable to get in the car, so they readmitted her. Mirta said they think she has a fracture in her knee and are looking into placing her in a rehab or something.  Appt cancelled. Said if she does not get admitted, she will call to reschedule appt.

## 2022-11-11 NOTE — PLAN OF CARE
Pt. Back in room resting after attempt to transfer home in private vehicle earlier tonight.  - Call button within reach. Pain medication given per MD order.  Problem: Adult Inpatient Plan of Care  Goal: Plan of Care Review  Outcome: Ongoing, Progressing  Goal: Patient-Specific Goal (Individualized)  Outcome: Ongoing, Progressing  Goal: Absence of Hospital-Acquired Illness or Injury  Outcome: Ongoing, Progressing  Goal: Optimal Comfort and Wellbeing  Outcome: Ongoing, Progressing  Goal: Readiness for Transition of Care  Outcome: Ongoing, Progressing     Problem: Diabetes Comorbidity  Goal: Blood Glucose Level Within Targeted Range  Outcome: Ongoing, Progressing     Problem: Impaired Wound Healing  Goal: Optimal Wound Healing  Outcome: Ongoing, Progressing     Problem: Skin Injury Risk Increased  Goal: Skin Health and Integrity  Outcome: Ongoing, Progressing

## 2022-11-11 NOTE — SUBJECTIVE & OBJECTIVE
Interval History: See Hospital Course    Review of Systems   Constitutional:  Positive for activity change. Negative for chills, fatigue and fever.   HENT: Negative.     Respiratory:  Negative for cough and shortness of breath.    Cardiovascular:  Positive for leg swelling. Negative for chest pain.   Gastrointestinal:  Negative for abdominal pain, diarrhea, nausea and vomiting.   Genitourinary: Negative.    Musculoskeletal:  Positive for arthralgias, gait problem and myalgias.   Skin:  Positive for color change.        Multiple ecchymosis to right face   Psychiatric/Behavioral: Negative.     Objective:     Vital Signs (Most Recent):  Temp: 98.6 °F (37 °C) (11/11/22 1231)  Pulse: 67 (11/11/22 1231)  Resp: 17 (11/11/22 1231)  BP: (!) 110/51 (11/11/22 1231)  SpO2: (!) 94 % (11/11/22 1231)   Vital Signs (24h Range):  Temp:  [97.8 °F (36.6 °C)-98.7 °F (37.1 °C)] 98.6 °F (37 °C)  Pulse:  [62-74] 67  Resp:  [16-18] 17  SpO2:  [92 %-97 %] 94 %  BP: (110-175)/() 110/51     Weight: 56.7 kg (125 lb)  Body mass index is 23.62 kg/m².    Intake/Output Summary (Last 24 hours) at 11/11/2022 1231  Last data filed at 11/11/2022 0906  Gross per 24 hour   Intake 240 ml   Output 200 ml   Net 40 ml      Physical Exam  Vitals reviewed.   HENT:      Head: Normocephalic.      Nose: Nose normal.   Eyes:      Conjunctiva/sclera: Conjunctivae normal.   Cardiovascular:      Rate and Rhythm: Normal rate and regular rhythm.   Pulmonary:      Effort: Pulmonary effort is normal.      Breath sounds: Normal breath sounds.   Abdominal:      Palpations: Abdomen is soft.   Musculoskeletal:      Cervical back: Normal range of motion and neck supple.      Right knee: Swelling, ecchymosis and bony tenderness present. No deformity. Decreased range of motion.   Skin:     Comments: Multiple bruises to the right face and right upper arm   Neurological:      General: No focal deficit present.      Mental Status: She is alert and oriented to person, place,  and time.   Psychiatric:         Thought Content: Thought content normal.       Significant Labs: All pertinent labs within the past 24 hours have been reviewed.  CBC:   Recent Labs   Lab 11/10/22  0248   WBC 12.37   HGB 13.1   HCT 39.0        CMP:   Recent Labs   Lab 11/10/22  0248      K 4.2      CO2 22*   *   BUN 17   CREATININE 0.8   CALCIUM 9.8   PROT 6.5   ALBUMIN 3.8   BILITOT 1.1*   ALKPHOS 92   AST 39   ALT 53*   ANIONGAP 12       Significant Imaging: I have reviewed all pertinent imaging results/findings within the past 24 hours.

## 2022-11-12 PROCEDURE — 25000003 PHARM REV CODE 250: Performed by: NURSE PRACTITIONER

## 2022-11-12 PROCEDURE — 25000003 PHARM REV CODE 250: Performed by: STUDENT IN AN ORGANIZED HEALTH CARE EDUCATION/TRAINING PROGRAM

## 2022-11-12 PROCEDURE — 25000003 PHARM REV CODE 250: Performed by: FAMILY MEDICINE

## 2022-11-12 PROCEDURE — 97530 THERAPEUTIC ACTIVITIES: CPT | Mod: CQ

## 2022-11-12 PROCEDURE — 11000001 HC ACUTE MED/SURG PRIVATE ROOM

## 2022-11-12 PROCEDURE — 97116 GAIT TRAINING THERAPY: CPT | Mod: CQ

## 2022-11-12 PROCEDURE — 99024 PR POST-OP FOLLOW-UP VISIT: ICD-10-PCS | Mod: POP,,, | Performed by: ORTHOPAEDIC SURGERY

## 2022-11-12 PROCEDURE — 99024 POSTOP FOLLOW-UP VISIT: CPT | Mod: POP,,, | Performed by: ORTHOPAEDIC SURGERY

## 2022-11-12 RX ORDER — BISACODYL 5 MG
5 TABLET, DELAYED RELEASE (ENTERIC COATED) ORAL DAILY PRN
Status: DISCONTINUED | OUTPATIENT
Start: 2022-11-12 | End: 2022-11-15 | Stop reason: HOSPADM

## 2022-11-12 RX ADMIN — METOPROLOL SUCCINATE 50 MG: 50 TABLET, EXTENDED RELEASE ORAL at 09:11

## 2022-11-12 RX ADMIN — BISACODYL 5 MG: 5 TABLET, COATED ORAL at 08:11

## 2022-11-12 RX ADMIN — KETOROLAC TROMETHAMINE 10 MG: 10 TABLET, FILM COATED ORAL at 08:11

## 2022-11-12 RX ADMIN — DULOXETINE 30 MG: 30 CAPSULE, DELAYED RELEASE ORAL at 09:11

## 2022-11-12 RX ADMIN — AMLODIPINE BESYLATE 5 MG: 5 TABLET ORAL at 09:11

## 2022-11-12 NOTE — PLAN OF CARE
Problem: Adult Inpatient Plan of Care  Goal: Plan of Care Review  Outcome: Ongoing, Progressing  Goal: Patient-Specific Goal (Individualized)  Outcome: Ongoing, Progressing  Goal: Absence of Hospital-Acquired Illness or Injury  Outcome: Ongoing, Progressing  Goal: Optimal Comfort and Wellbeing  Outcome: Ongoing, Progressing  Goal: Readiness for Transition of Care  Outcome: Ongoing, Progressing     Problem: Diabetes Comorbidity  Goal: Blood Glucose Level Within Targeted Range  Outcome: Ongoing, Progressing     Problem: Impaired Wound Healing  Goal: Optimal Wound Healing  Outcome: Ongoing, Progressing     Problem: Skin Injury Risk Increased  Goal: Skin Health and Integrity  Outcome: Ongoing, Progressing     Problem: Pain Acute  Goal: Acceptable Pain Control and Functional Ability  Outcome: Ongoing, Progressing     Problem: Fall Injury Risk  Goal: Absence of Fall and Fall-Related Injury  Outcome: Ongoing, Progressing

## 2022-11-12 NOTE — PT/OT/SLP PROGRESS
Physical Therapy  Treatment    Leslie Wood   MRN: [id number]   Admitting Diagnosis: Hemarthrosis of right knee       PT Start Time: 1115     PT Stop Time: 1140    PT Total Time (min): 25 min       Billable Minutes:  Gait Training 15 and Therapeutic Activity 10    Treatment Type: Treatment  PT/PTA: PTA     PTA Visit Number: 2       General Precautions: Standard, fall  Orthopedic Precautions: RLE weight bearing as tolerated   Braces:           Subjective:  Communicated with HELENA prior to session.      Pain/Comfort  Pain Rating 1: 0/10  Pain Rating Post-Intervention 1: 0/10    Objective:   Patient found with: Tierra peripheral IV    Treatment and Education:    DONNED R KNEE BRACE IN SUPINE WITH ASSISTANCE FROM DTR    BED MOB MOD A WITH R LOWER EXTREMITY AND VC FOR UPPER EXTREMITY PLACEMENT AND FOR TECHNIQUE    SIT-->STAND FROM ELEVATED BED WITH VC OR UPPER EXTREMITY PLACEMENT ON UPPER/LOWER BED RAILS    RW 15' CG VC FOR SEQUENCE WITH 3 POINT GTY PATTERN WITH R LW WBAT    FLY AND PT WAS VERY PLEASED WIT OUTCOME OF THIS SESSION     AM-PAC 6 CLICK MOBILITY  How much help from another person does this patient currently need?   1 = Unable, Total/Dependent Assistance  2 = A lot, Maximum/Moderate Assistance  3 = A little, Minimum/Contact Guard/Supervision  4 = None, Modified [place]/Independent    Turning over in bed (including adjusting bedclothes, sheets and blankets)?: 3  Sitting down on and standing up from a chair with arms (e.g., wheelchair, bedside commode, etc.):  (NA)  Moving from lying on back to sitting on the side of the bed?: 3  Moving to and from a bed to a chair (including a wheelchair)?:  (NA)  Need to walk in hospital room?: 3  Climbing 3-5 steps with a railing?: 1    AM-PAC Raw Score CMS G-Code Modifier Level of Impairment Assistance   6 % Total / Unable   7 - 9 CM 80 - 100% Maximal Assist   10 - 14 CL 60 - 80% Moderate Assist   15 - 19 CK 40 - 60% Moderate Assist   20 - 22 CJ 20 - 40%  Minimal Assist   23 CI 1-20% SBA / CGA   24 CH 0% Independent/ Mod I     Patient left supine with all lines intact, call button in reach, bed alarm on, and DTR present.    Assessment:  Leslie Wood is a 82 y.o. female with a medical diagnosis of Hemarthrosis of right knee and presents with .    Rehab identified problem list/impairments: Rehab identified problem list/impairments: weakness, gait instability, decreased upper extremity function, decreased lower extremity function, impaired endurance, impaired self care skills, impaired functional mobility    Rehab potential is good.    Activity tolerance: Good    Discharge recommendations: Discharge Facility/Level of Care Needs: nursing facility, skilled     Barriers to discharge:      Equipment recommendations: Equipment Needed After Discharge: bedside commode, wheelchair, walker, rolling     GOALS:   Multidisciplinary Problems       Physical Therapy Goals          Problem: Physical Therapy    Goal Priority Disciplines Outcome Goal Variances Interventions   Physical Therapy Goal     PT, PT/OT      Description: LT22  1. PT WILL COMPLETE BED MOBILITY WITH MIN A  2. PT WILL STAND T/F TO CHAIR WITH RW WBAT WITH RW AND MAX A  3. PT WILL COMPLETE B LE X 10 REPS TO FACILITATE INC AROM AND STRENGTHENING.                        PLAN:    Patient to be seen 3 x/week  to address the above listed problems via gait training, therapeutic activities, therapeutic exercises  Plan of Care expires: 22  Plan of Care reviewed with: patient         2022

## 2022-11-12 NOTE — PROGRESS NOTES
Leslie Wood is a 82 y.o. female patient.     Subjective    The patient remains hospitalized with right knee pain following acute trauma.  She is now 3rd day out from her fall landing onto the right knee.  Has hemarthrosis.  Has end-stage osteoarthritis also.  Undoubtedly has occult fracture in the distal femur or proximal tibia.  Says she feels a little better today.  Was able to get out of the bed yesterday afternoon but can not tolerate any weight-bearing on the right leg.  Her daughter is at the bedside.  Her daughter wonders about the patient going back on her blood thinners.    1. Effusion of right knee    2. Right knee pain    3. Fall, initial encounter    4. Intractable pain    5. Wood splinter under fingernail    6. Hemarthrosis of right knee    7. Chest pain      Past Medical History:   Diagnosis Date    Arthritis     Cataract     GERD (gastroesophageal reflux disease)     Heart attack 05/18/2022    Heart attack 05/23/2022    Hyperlipidemia     Hypertension     Stroke      No past surgical history pertinent negatives on file.  Scheduled Meds:   amLODIPine  5 mg Oral Daily    DULoxetine  30 mg Oral Daily    metoprolol succinate  50 mg Oral Daily     Continuous Infusions:  PRN Meds:acetaminophen, dextrose 10%, dextrose 10%, glucagon (human recombinant), glucose, glucose, HYDROcodone-acetaminophen, ketorolac, morphine, naloxone, ondansetron, sodium chloride 0.9%    Review of patient's allergies indicates:   Allergen Reactions    Prazosin Other (See Comments)     dizziness    Amitriptyline     Hydrochlorothiazide      Causes muscle cramping    Lisinopril      hyperkalemia    Percocet [oxycodone-acetaminophen] Other (See Comments)     Seizures    Belbuca [buprenorphine hcl] Nausea And Vomiting and Other (See Comments)     Black out     Codeine Nausea Only and Rash     Active Hospital Problems    Diagnosis  POA    *Hemarthrosis of right knee [M25.061]  Unknown    Effusion of right knee [M25.461]  Unknown     "Intractable pain [R52]  Unknown    Essential hypertension [I10]  Yes     Chronic      Resolved Hospital Problems   No resolved problems to display.       Objective:  Vital signs (most recent): Blood pressure (!) 108/51, pulse 69, temperature 98 °F (36.7 °C), resp. rate 20, height 5' 1" (1.549 m), weight 57.7 kg (127 lb 3.3 oz), SpO2 (!) 93 %.    Well-developed well-nourished female in no acute distress.  She is awake, alert, oriented, cooperative with evaluation.  Has resolving ecchymoses over the right side of her face.    Examination of the right lower extremity reveals mild to severe tenderness from the upper thigh down to the ankle.  There is 2+ swelling of the knee joint consistent with hemarthrosis.  No thigh or calf swelling.  Compartments are all soft.  Moves the ankle and foot reluctantly due to knee pain.  Unable to actively flex the hip and or knee due to knee pain.     Assessment & Plan       The patient has end-stage osteoarthritis of the right knee, contusion with hemarthrosis and occult fracture.  Went over the nature of these problems with her and her daughter.  I do not feel that further imaging is required for treatment.  The plan is to continue with knee immobilizer, gradual mobilization and then eventually total knee replacement.  Discharge to skilled nursing is anticipated with follow-up in the [facility] in 10 days.    I do not have information regarding the patient's anticoagulant medicine prior to this hospitalization.  Her daughter is going to find out more about that.  She had coronary catheterization in June which was negative for any blockages, no stenting was done.    11/12/2022        Albert Ryan MD, FAAOS [facility], Orthopedic Trauma Service  [place]    "

## 2022-11-12 NOTE — CONSULTS
Thank you for your consult to [facility]. We have reviewed the patient chart. This patient does meet criteria for [facility] service at this time. Will assume care on 11/12/22 at 7AM

## 2022-11-12 NOTE — PLAN OF CARE
DONNED R KNEE BRACE IN SUPINE WITH ASSISTANCE FROM DTR    BED MOB MOD A WITH R LOWER EXTREMITY AND VC FOR UPPER EXTREMITY PLACEMENT AND FOR TECHNIQUE    SIT-->STAND FROM ELEVATED BED WITH VC OR UPPER EXTREMITY PLACEMENT ON UPPER/LOWER BED RAILS    RW 15' CG VC FOR SEQUENCE WITH 3 POINT GTY PATTERN WITH R LW WBAT    FLY AND PT WAS VERY PLEASED WIT OUTCOME OF THIS SESSION

## 2022-11-12 NOTE — PLAN OF CARE
Pt remains free from falls/injuries this shift. Safety precautions maintained. Pain managed with rest and elevation. No s/s of acute distress noted. Will continue to monitor. Chart check completed.

## 2022-11-13 PROCEDURE — 25000003 PHARM REV CODE 250: Performed by: STUDENT IN AN ORGANIZED HEALTH CARE EDUCATION/TRAINING PROGRAM

## 2022-11-13 PROCEDURE — 97530 THERAPEUTIC ACTIVITIES: CPT | Mod: CQ

## 2022-11-13 PROCEDURE — 99231 SBSQ HOSP IP/OBS SF/LOW 25: CPT | Mod: ,,, | Performed by: ORTHOPAEDIC SURGERY

## 2022-11-13 PROCEDURE — 11000001 HC ACUTE MED/SURG PRIVATE ROOM

## 2022-11-13 PROCEDURE — 99231 PR SUBSEQUENT HOSPITAL CARE,LEVL I: ICD-10-PCS | Mod: ,,, | Performed by: ORTHOPAEDIC SURGERY

## 2022-11-13 PROCEDURE — 97116 GAIT TRAINING THERAPY: CPT | Mod: CQ

## 2022-11-13 PROCEDURE — 25000003 PHARM REV CODE 250: Performed by: FAMILY MEDICINE

## 2022-11-13 PROCEDURE — 97535 SELF CARE MNGMENT TRAINING: CPT

## 2022-11-13 RX ADMIN — DULOXETINE 30 MG: 30 CAPSULE, DELAYED RELEASE ORAL at 09:11

## 2022-11-13 RX ADMIN — AMLODIPINE BESYLATE 5 MG: 5 TABLET ORAL at 09:11

## 2022-11-13 RX ADMIN — METOPROLOL SUCCINATE 50 MG: 50 TABLET, EXTENDED RELEASE ORAL at 09:11

## 2022-11-13 NOTE — PT/OT/SLP PROGRESS
Occupational Therapy   Treatment    Name: Leslie Wood  MRN: [id number]  Admitting Diagnosis:  Hemarthrosis of right knee       Recommendations:     Discharge Recommendations: nursing facility, skilled  Discharge Equipment Recommendations:  other (see comments) (TBD AT NEXT LEVEL OF CARE)  Barriers to discharge:  None    Assessment:     Leslie Wood is a 82 y.o. female with a medical diagnosis of Hemarthrosis of right knee.  She presents with generalized weakness and debility. Performance deficits affecting function are weakness, impaired endurance, impaired self care skills, impaired functional mobility, gait instability, impaired balance, impaired cognition, decreased lower extremity function, decreased safety awareness, impaired cardiopulmonary response to activity.     Rehab Prognosis:  Fair; patient would benefit from acute skilled OT services to address these deficits and reach maximum level of function.       Plan:     Patient to be seen 2 x/week to address the above listed problems via self-care/home management, therapeutic activities, therapeutic exercises  Plan of Care Expires: 11/24/22  Plan of Care Reviewed with: patient, family    Subjective     Pain/Comfort:  Pain Rating 1: 0/10    Objective:     Communicated with: Nurse Liu and epic chart review prior to session.  Patient found supine with PureWick, peripheral IV upon OT entry to room.    General Precautions: Standard, fall   Orthopedic Precautions:RLE weight bearing as tolerated   Braces: Knee immobilizer  Respiratory Status: Room air     Occupational Performance:     Bed Mobility:    Patient completed Rolling/Turning to Left with  minimum assistance  Patient completed Scooting/Bridging with minimum assistance  Patient completed Supine to Sit with minimum assistance     Functional Mobility/Transfers:  Patient completed Sit <> Stand Transfer with minimum assistance  with  rolling walker   Functional Mobility: Pt completed steps from bed to restroom  with Min/Mod A secondary to RW management cues and for safety     Activities of Daily Living:  Lower Body Dressing: maximal assistance to don knee immobilizer in bed and to don/doff brief while standing at commode; including threading  BLE and pulling above/under hips   Toileting: maximal assistance to complete BM seated on commode. Max A for k[place] hygiene and LE clothing management       [facility] 6 Click ADL: 16    Treatment & Education:  Pt tolerated tx well. Pt educated on benefits of actively participating in occupational therapy and POC. Pt educated on fall prevention and use of call button for assistance. Pt encouraged to complete B UE therex throughout day to improve endurance and muscle strength. Pt verbalized understanding of all education.      Patient left up in chair with all lines intact, chair alarm on, and family present    GOALS:   Multidisciplinary Problems       Occupational Therapy Goals          Problem: Occupational Therapy    Goal Priority Disciplines Outcome Interventions   Occupational Therapy Goal     OT, PT/OT Ongoing, Progressing    Description: Goals to be met by: 11/24/22     Patient will increase functional independence with ADLs by performing:    Toileting from bedside commode with Minimal Assistance for hygiene and clothing management.   Stand pivot transfers with Moderate Assistance with RW.  Upper extremity exercise program x20 reps per handout, with assistance as needed.                         Time Tracking:     OT Date of Treatment: 11/13/22  OT Start Time: 1140  OT Stop Time: 1210  OT Total Time (min): 30 min    Billable Minutes:Self Care/Home Management 30 Minutes    OT/RAJWINDER: OT          11/13/2022

## 2022-11-13 NOTE — PROGRESS NOTES
Leslie Wood is a 82 y.o. female patient.     Subjective    The patient remains hospitalized following her right knee trauma.  Four days post injury.  The right knee is beginning to feel better.  She got out of the bed last night without assistance, inappropriately.    1. Effusion of right knee    2. Right knee pain    3. Fall, initial encounter    4. Intractable pain    5. Wood splinter under fingernail    6. Hemarthrosis of right knee    7. Chest pain      Past Medical History:   Diagnosis Date    Arthritis     Cataract     GERD (gastroesophageal reflux disease)     Heart attack 05/18/2022    Heart attack 05/23/2022    Hyperlipidemia     Hypertension     Stroke      No past surgical history pertinent negatives on file.  Scheduled Meds:   amLODIPine  5 mg Oral Daily    DULoxetine  30 mg Oral Daily    metoprolol succinate  50 mg Oral Daily     Continuous Infusions:  PRN Meds:acetaminophen, bisacodyL, dextrose 10%, dextrose 10%, glucagon (human recombinant), glucose, glucose, HYDROcodone-acetaminophen, ketorolac, morphine, naloxone, ondansetron, sodium chloride 0.9%    Review of patient's allergies indicates:   Allergen Reactions    Prazosin Other (See Comments)     dizziness    Amitriptyline     Hydrochlorothiazide      Causes muscle cramping    Lisinopril      hyperkalemia    Percocet [oxycodone-acetaminophen] Other (See Comments)     Seizures    Belbuca [buprenorphine hcl] Nausea And Vomiting and Other (See Comments)     Black out     Codeine Nausea Only and Rash     Active Hospital Problems    Diagnosis  POA    *Hemarthrosis of right knee [M25.061]  Unknown    Effusion of right knee [M25.461]  Unknown    Intractable pain [R52]  Unknown    Essential hypertension [I10]  Yes     Chronic      Resolved Hospital Problems   No resolved problems to display.       Objective:  Vital signs (most recent): Blood pressure (!) 151/68, pulse 78, temperature 97.8 °F (36.6 °C), temperature source Oral, resp. rate 18, height 5'  "1" (1.549 m), weight 58.3 kg (128 lb 8.5 oz), SpO2 (!) 94 %.    Well-developed well-nourished female in no acute distress.  She is awake, alert, oriented, cooperative with evaluation.  Fading ecchymoses over the right side of the face    Examination of the right lower extremity reveals that there is tenderness only at the medial tibial plateau.  Knee fusion is still 2+ but softer.  Has 0° to 80° range of motion with significant quad weakness and extension lag.     Assessment & Plan       The patient has begun to improve.  Has end-stage osteoarthritis, knee contusion with hemarthrosis and likely occult fracture in the medial tibial plateau region.  Continue with occupational and physical therapy.  Knee immobilizer for stability.  Once again told the patient not to attempt getting out of the bed without assistance.      I believe she is a good candidate for inpatient rehab transfer and await the physical therapy evaluation today.    11/13/2022        Albert Ryan MD, FAAOS [facility], Orthopedic Trauma Service  [place]   "

## 2022-11-13 NOTE — PLAN OF CARE
DONNED R KNEE BRACE IN SUPINE    BED MOB ROLLING TO L MOD A VC FOR UPPER EXTREMITY PLACEMENT    SIT<-->STAND ELEVATED BED MIN A X 2 V C FOR UPPER EXTREMITY PLACEMENT    RW 2 X 10' CG SLOW YADIRA. STATIC STANDING FOR OT TO PERFORM HYGIENE AFTER USING BATHROOM    EDUCATED ON OOB X 3 MEALS

## 2022-11-13 NOTE — PLAN OF CARE
VSS. Fall precautions maintained.   Pain is well controlled.   Repositioned q2hrs and as needed.  Worked with therapy today.  Family at bedside.   All needs met. Will continue to monitor.

## 2022-11-13 NOTE — PT/OT/SLP PROGRESS
Physical Therapy  Treatment    Leslie Wood   MRN: [id number]   Admitting Diagnosis: Hemarthrosis of right knee       PT Start Time: 1135     PT Stop Time: 1200    PT Total Time (min): 25 min       Billable Minutes:  Gait Training 15 and Therapeutic Activity 10    Treatment Type: Treatment  PT/PTA: PTA     PTA Visit Number: 3       General Precautions: Standard, fall  Orthopedic Precautions: RLE weight bearing as tolerated   Braces:       Subjective:  Communicated with MATTHEW T   prior to session.      Pain/Comfort  Pain Rating 1: 0/10  Pain Rating Post-Intervention 1: 0/10    Objective:   Patient found with: Tierra    Treatment and Education:    DONNED R KNEE BRACE IN SUPINE    BED MOB ROLLING TO L MOD A VC FOR UPPER EXTREMITY PLACEMENT    SIT<-->STAND ELEVATED BED MIN A X 2 V C FOR UPPER EXTREMITY PLACEMENT    RW 2 X 10' CG SLOW YADIRA. STATIC STANDING FOR OT TO PERFORM HYGIENE AFTER USING BATHROOM    EDUCATED ON OOB X 3 MEALS     AM-PAC 6 CLICK MOBILITY  How much help from another person does this patient currently need?   1 = Unable, Total/Dependent Assistance  2 = A lot, Maximum/Moderate Assistance  3 = A little, Minimum/Contact Guard/Supervision  4 = None, Modified [place]/Independent    Turning over in bed (including adjusting bedclothes, sheets and blankets)?: 3  Sitting down on and standing up from a chair with arms (e.g., wheelchair, bedside commode, etc.): 3  Moving from lying on back to sitting on the side of the bed?: 3  Moving to and from a bed to a chair (including a wheelchair)?: 3  Need to walk in hospital room?: 3  Climbing 3-5 steps with a railing?: 1  Basic Mobility Total Score: 16    AM-PAC Raw Score CMS G-Code Modifier Level of Impairment Assistance   6 % Total / Unable   7 - 9 CM 80 - 100% Maximal Assist   10 - 14 CL 60 - 80% Moderate Assist   15 - 19 CK 40 - 60% Moderate Assist   20 - 22 CJ 20 - 40% Minimal Assist   23 CI 1-20% SBA / CGA   24 CH 0% Independent/ Mod I     Patient  left up in chair with all lines intact, call button in reach, and FAMILY present.    Assessment:  Leslie Wood is a 82 y.o. female with a medical diagnosis of Hemarthrosis of right knee and presents with .    Rehab identified problem list/impairments: Rehab identified problem list/impairments: weakness, gait instability, decreased upper extremity function, decreased lower extremity function, impaired balance, impaired endurance, decreased safety awareness, impaired self care skills, impaired functional mobility    Rehab potential is good.    Activity tolerance: Good    Discharge recommendations: Discharge Facility/Level of Care Needs: nursing facility, skilled     Barriers to discharge:      Equipment recommendations: Equipment Needed After Discharge: bedside commode, wheelchair, walker, rolling     GOALS:   Multidisciplinary Problems       Physical Therapy Goals          Problem: Physical Therapy    Goal Priority Disciplines Outcome Goal Variances Interventions   Physical Therapy Goal     PT, PT/OT      Description: LT22  1. PT WILL COMPLETE BED MOBILITY WITH MIN A  2. PT WILL STAND T/F TO CHAIR WITH RW WBAT WITH RW AND MAX A  3. PT WILL COMPLETE B LE X 10 REPS TO FACILITATE INC AROM AND STRENGTHENING.                        PLAN:    Patient to be seen 3 x/week  to address the above listed problems via gait training, therapeutic activities, therapeutic exercises  Plan of Care expires: 22  Plan of Care reviewed with: patient         2022

## 2022-11-14 PROCEDURE — 99232 PR SUBSEQUENT HOSPITAL CARE,LEVL II: ICD-10-PCS | Mod: ,,, | Performed by: PHYSICIAN ASSISTANT

## 2022-11-14 PROCEDURE — 99232 SBSQ HOSP IP/OBS MODERATE 35: CPT | Mod: ,,, | Performed by: PHYSICIAN ASSISTANT

## 2022-11-14 PROCEDURE — 25000003 PHARM REV CODE 250: Performed by: FAMILY MEDICINE

## 2022-11-14 PROCEDURE — 97530 THERAPEUTIC ACTIVITIES: CPT

## 2022-11-14 PROCEDURE — 97116 GAIT TRAINING THERAPY: CPT

## 2022-11-14 PROCEDURE — 25000003 PHARM REV CODE 250: Performed by: STUDENT IN AN ORGANIZED HEALTH CARE EDUCATION/TRAINING PROGRAM

## 2022-11-14 PROCEDURE — 11000001 HC ACUTE MED/SURG PRIVATE ROOM

## 2022-11-14 RX ADMIN — METOPROLOL SUCCINATE 50 MG: 50 TABLET, EXTENDED RELEASE ORAL at 10:11

## 2022-11-14 RX ADMIN — AMLODIPINE BESYLATE 5 MG: 5 TABLET ORAL at 10:11

## 2022-11-14 RX ADMIN — DULOXETINE 30 MG: 30 CAPSULE, DELAYED RELEASE ORAL at 10:11

## 2022-11-14 RX ADMIN — KETOROLAC TROMETHAMINE 10 MG: 10 TABLET, FILM COATED ORAL at 05:11

## 2022-11-14 NOTE — PROGRESS NOTES
[facility]  Telemedicine Progress Note    Patient Name: Leslie Wood  MRN: [id number]  Patient Class: IP- Inpatient   Admission Date: 11/10/2022  Length of Stay: 2 days  Attending Physician: Elliot Garibay MD  Primary Care Provider: Angeles Carson MD          Subjective:     Principal Problem:Hemarthrosis of right knee        HPI:  Leslie Wood is an 82-year-old female with past medical history significant for hypertension, hyperlipidemia, diabetes, vitamin-D deficiency, anxiety, chronic back pain, osteopenia, CVA, and stress-induced cardiomyopathy who is admitted for right knee pain and swelling following a possible unwitnessed fall.  She has severe arthritis of the right knee and is a patient of Dr. Kvng Cazares.  Patient was last seen by Olivia Garcia PA-C on 09/20/2022 and received corticosteroid injection to the right knee.  Patient's daughter is with her in the emergency department and reports that she was in the other room when the injury occurred.  She heard a loud crash and thinks that her mother either fell or walked into a closed door.  Patient complains of severe pain and swelling to the right knee.  Patient has a history of low back problems that radiates into lower extremities.  Patient denies change in her motor function or sensation at this time.  Patient takes aspirin, but she is not currently on any other blood thinners         Overview/Hospital Course:  11/10     Examination done at bedside, patient alert and oriented, stated improvement in pain after undergoing Ortho drainage;  Ortho evaluated and  stated that since the patient received corticosteroid injection just 6 weeks ago they recommended only aspirate the joint at this time.  Aspiration was performed and 64 cc of bloody fluid was removed.  Patient should be placed into a knee immobilizer and follow-up with Ortho Trauma Clinic in 7-10 days.  Patient is ready for discharge from orthopedic  standpoint.  PT evaluated and recommended skilled nursing facility, however as per  patient does not qualify for facility at this time based on her insurance since patient does not meet 3 overnight criteria to stay in the hospital, discussed about the plan with the patient and daughter at bedside, they opted for home with home therapy- ordered home with home therapy accordingly.    Ordered pain medications, recommended on follow-up visits.    Labs reviewed;  Considering clinical and hemodynamic stability, planning to discharge patient today.    Patient and daughter agreed to the plan.    Charge nurse arranging on transport.    11/1/22 - Family and patient verbalized severe pain to fractured right knee, decreased function immobility. Pt desires discharge to Skilled Nursing Facility. Orthopedics plans for bracing the right knee with healing of the occult fracture. She will need a cardiology work up the then right total knee replaced. Have discussed that also with Dr Ibarra.  Since she had a intra-articular steroid injection done in September, likely no total knee replacement until late December or January.  consulted to assist with SNF placement. Pt will follow up with Ortho Trauma clinic in 7 to 10 days.       Interval History: doing well, working with therapy. Pending placement.    Review of Systems   Constitutional:  Positive for activity change. Negative for chills, fatigue and fever.   HENT: Negative.     Respiratory:  Negative for cough and shortness of breath.    Cardiovascular:  Positive for leg swelling. Negative for chest pain.   Gastrointestinal:  Negative for abdominal pain, diarrhea, nausea and vomiting.   Genitourinary: Negative.    Musculoskeletal:  Positive for arthralgias, gait problem and myalgias.   Skin:  Positive for color change.        Multiple ecchymosis to right face   Psychiatric/Behavioral: Negative.     Objective:     Vital Signs (Most Recent):  Temp: 98.9 °F  (37.2 °C) (11/13/22 2357)  Pulse: 78 (11/13/22 2357)  Resp: 19 (11/13/22 2357)  BP: 126/72 (11/13/22 2357)  SpO2: (!) 93 % (11/13/22 2357)   Vital Signs (24h Range):  Temp:  [97.8 °F (36.6 °C)-99.2 °F (37.3 °C)] 98.9 °F (37.2 °C)  Pulse:  [70-79] 78  Resp:  [16-19] 19  SpO2:  [93 %-97 %] 93 %  BP: (126-165)/(61-72) 126/72     Weight: 58.2 kg (128 lb 4.9 oz)  Body mass index is 24.24 kg/m².    Intake/Output Summary (Last 24 hours) at 11/13/2022 2357  Last data filed at 11/13/2022 0305  Gross per 24 hour   Intake --   Output 175 ml   Net -175 ml        Physical Exam  Vitals reviewed.   HENT:      Head: Normocephalic.      Nose: Nose normal.   Eyes:      Conjunctiva/sclera: Conjunctivae normal.   Cardiovascular:      Rate and Rhythm: Normal rate and regular rhythm.   Pulmonary:      Effort: Pulmonary effort is normal.      Breath sounds: Normal breath sounds.   Abdominal:      Palpations: Abdomen is soft.   Musculoskeletal:      Cervical back: Normal range of motion and neck supple.      Right knee: Swelling, ecchymosis and bony tenderness present. No deformity. Decreased range of motion.   Skin:     Comments: Multiple bruises to the right face and right upper arm   Neurological:      General: No focal deficit present.      Mental Status: She is alert and oriented to person, place, and time.   Psychiatric:         Thought Content: Thought content normal.       Significant Labs: All pertinent labs within the past 24 hours have been reviewed.  CBC:   No results for input(s): WBC, HGB, HCT, PLT in the last 48 hours.    CMP:   No results for input(s): NA, K, CL, CO2, GLU, BUN, CREATININE, CALCIUM, PROT, ALBUMIN, BILITOT, ALKPHOS, AST, ALT, ANIONGAP, EGFRNONAA in the last 48 hours.    Invalid input(s): ESTGFAFRICA      Significant Imaging: I have reviewed all pertinent imaging results/findings within the past 24 hours.      Assessment/Plan:      * Hemarthrosis of right knee  ortho evaluated and aspiration was done.     Aspirin withheld given hemarthrosis, significant bruises throughout the body.        Intractable pain  Prn analgesia      Effusion of right knee  Ortho eval and Aspiration was performed and 64 cc of bloody fluid was removed.    Patient should be placed into a knee immobilizer and follow-up with Ortho Trauma Clinic in 7-10 days.   Cleared for discharge from ortho standpoint.    PT OT evaluated and recommended skilled nursing place facility placement, follow-up with .          Essential hypertension  Resume home meds  controlled        VTE Risk Mitigation (From admission, onward)         Ordered     IP VTE HIGH RISK PATIENT  Once         11/10/22 1128     Place sequential compression device  Until discontinued         11/10/22 1128                      I have completed this tele-visit with the assistance of a telepresenter.    The attending portion of this evaluation, treatment, and documentation was performed per Elliot Garibay MD via Telemedicine [employer] using the secure [employer] software platform with 2 way audio/video. The provider was located off-site and the patient is located in the hospital. The aforementioned video software was utilized to document the relevant history and physical exam    Elliot Garibay MD  Department of Hospital Medicine   [facility]

## 2022-11-14 NOTE — PLAN OF CARE
Buster spoke with Elsa at [facility], facility is able to accept the patient, has spoke with the patients daughter and they are agreeable to moving forward. Per Elsa the facility can take the patient tomorrow 11/15/2022 and will need a chest x-ray. Provider notified.

## 2022-11-14 NOTE — PLAN OF CARE
TX COMPLETED: facilitated bed mobility with mod A, transfers with mod A, ambulated 3 ft with mod A and RW. Recommend SNF

## 2022-11-14 NOTE — SUBJECTIVE & OBJECTIVE
Interval History: doing well, working with therapy. Accepted to [facility] and anticipate discharge there tomorrow.     Review of Systems   Constitutional:  Positive for activity change. Negative for chills, fatigue and fever.   HENT: Negative.     Respiratory:  Negative for cough and shortness of breath.    Cardiovascular:  Negative for chest pain.   Gastrointestinal:  Negative for abdominal pain, diarrhea, nausea and vomiting.   Genitourinary: Negative.    Musculoskeletal:  Positive for arthralgias, gait problem and myalgias.   Skin:  Positive for color change.        Multiple ecchymosis to right face   Psychiatric/Behavioral: Negative.     Objective:     Vital Signs (Most Recent):  Temp: 98.3 °F (36.8 °C) (11/14/22 1205)  Pulse: 69 (11/14/22 1205)  Resp: 17 (11/14/22 1205)  BP: (!) 154/70 (11/14/22 1205)  SpO2: (!) 94 % (11/14/22 1205)   Vital Signs (24h Range):  Temp:  [98.1 °F (36.7 °C)-99.2 °F (37.3 °C)] 98.3 °F (36.8 °C)  Pulse:  [69-83] 69  Resp:  [13-19] 17  SpO2:  [93 %-97 %] 94 %  BP: (126-170)/(61-76) 154/70     Weight: 58.2 kg (128 lb 4.9 oz)  Body mass index is 24.24 kg/m².    Intake/Output Summary (Last 24 hours) at 11/14/2022 1416  Last data filed at 11/14/2022 1309  Gross per 24 hour   Intake 300 ml   Output 300 ml   Net 0 ml        Physical Exam  Vitals reviewed.   HENT:      Head: Normocephalic.   Eyes:      Conjunctiva/sclera: Conjunctivae normal.   Pulmonary:      Effort: Pulmonary effort is normal.      Breath sounds: Normal breath sounds.   Musculoskeletal:      Right knee: Swelling, ecchymosis and bony tenderness present. No deformity. Decreased range of motion.   Skin:     Comments: Multiple bruises to the right face and right upper arm   Neurological:      General: No focal deficit present.      Mental Status: She is alert and oriented to person, place, and time.   Psychiatric:         Thought Content: Thought content normal.       Significant Labs: All pertinent labs within the past 24  hours have been reviewed.  CBC:   No results for input(s): WBC, HGB, HCT, PLT in the last 48 hours.    CMP:   No results for input(s): NA, K, CL, CO2, GLU, BUN, CREATININE, CALCIUM, PROT, ALBUMIN, BILITOT, ALKPHOS, AST, ALT, ANIONGAP, EGFRNONAA in the last 48 hours.    Invalid input(s): ESTGFAFRICA      Significant Imaging: I have reviewed all pertinent imaging results/findings within the past 24 hours.

## 2022-11-14 NOTE — PLAN OF CARE
Pt AAOx4. Weight bearing as tolerated to RLE, immobilizer on while OOB. Requires assist x1 with ADL's and transfers. Voices no c/o's @ this time. Remains free from incident/injury. Call light in reach

## 2022-11-14 NOTE — SUBJECTIVE & OBJECTIVE
Interval History: doing well, working with therapy. Pending placement.    Review of Systems   Constitutional:  Positive for activity change. Negative for chills, fatigue and fever.   HENT: Negative.     Respiratory:  Negative for cough and shortness of breath.    Cardiovascular:  Positive for leg swelling. Negative for chest pain.   Gastrointestinal:  Negative for abdominal pain, diarrhea, nausea and vomiting.   Genitourinary: Negative.    Musculoskeletal:  Positive for arthralgias, gait problem and myalgias.   Skin:  Positive for color change.        Multiple ecchymosis to right face   Psychiatric/Behavioral: Negative.     Objective:     Vital Signs (Most Recent):  Temp: 98.9 °F (37.2 °C) (11/13/22 2357)  Pulse: 78 (11/13/22 2357)  Resp: 19 (11/13/22 2357)  BP: 126/72 (11/13/22 2357)  SpO2: (!) 93 % (11/13/22 2357)   Vital Signs (24h Range):  Temp:  [97.8 °F (36.6 °C)-99.2 °F (37.3 °C)] 98.9 °F (37.2 °C)  Pulse:  [70-79] 78  Resp:  [16-19] 19  SpO2:  [93 %-97 %] 93 %  BP: (126-165)/(61-72) 126/72     Weight: 58.2 kg (128 lb 4.9 oz)  Body mass index is 24.24 kg/m².    Intake/Output Summary (Last 24 hours) at 11/13/2022 2357  Last data filed at 11/13/2022 0305  Gross per 24 hour   Intake --   Output 175 ml   Net -175 ml        Physical Exam  Vitals reviewed.   HENT:      Head: Normocephalic.      Nose: Nose normal.   Eyes:      Conjunctiva/sclera: Conjunctivae normal.   Cardiovascular:      Rate and Rhythm: Normal rate and regular rhythm.   Pulmonary:      Effort: Pulmonary effort is normal.      Breath sounds: Normal breath sounds.   Abdominal:      Palpations: Abdomen is soft.   Musculoskeletal:      Cervical back: Normal range of motion and neck supple.      Right knee: Swelling, ecchymosis and bony tenderness present. No deformity. Decreased range of motion.   Skin:     Comments: Multiple bruises to the right face and right upper arm   Neurological:      General: No focal deficit present.      Mental Status: She  is alert and oriented to person, place, and time.   Psychiatric:         Thought Content: Thought content normal.       Significant Labs: All pertinent labs within the past 24 hours have been reviewed.  CBC:   No results for input(s): WBC, HGB, HCT, PLT in the last 48 hours.    CMP:   No results for input(s): NA, K, CL, CO2, GLU, BUN, CREATININE, CALCIUM, PROT, ALBUMIN, BILITOT, ALKPHOS, AST, ALT, ANIONGAP, EGFRNONAA in the last 48 hours.    Invalid input(s): ESTGFAFRICA      Significant Imaging: I have reviewed all pertinent imaging results/findings within the past 24 hours.

## 2022-11-14 NOTE — PLAN OF CARE
Patient remained free from injury. No s/s of acute distress. Repositioned as needed. 12hr chart check complete.

## 2022-11-14 NOTE — PT/OT/SLP PROGRESS
Physical Therapy  Treatment    Leslie Wood   MRN: [id number]   Admitting Diagnosis: Hemarthrosis of right knee       PT Start Time: 1545     PT Stop Time: 1610    PT Total Time (min): 25 min       Billable Minutes:  Gait Training 10 and Therapeutic Activity 15    Treatment Type: Treatment  PT/PTA: PT     PTA Visit Number: 0       General Precautions: Standard, fall  Orthopedic Precautions: RLE weight bearing as tolerated   Braces: Knee immobilizer  Respiratory Status: Room air    Spiritual, Cultural Beliefs, Anabaptist Practices, Values that Affect Care: no    Subjective:  Communicated with nurse prior to session.  Pt agreeable to PT services. Pleasant and slightly confused    Pain/Comfort  Pain Rating 1: 0/10    Objective:   Patient found with: PureWick, peripheral IV    Functional Mobility:  Facilitated bed mobility with cues for hand placement and mod A, mod A for scooting to EOB. Transfers training with education on safety awareness including push up from bed instead of use of walker. Mod A to achieve standing positioning and increased time needed to steady. Gait training with cues for weight shifting and RW management. Decreased stance time to RLE secondary to pain with mobility/weightbearing. Educated to utilize UE and RW to offset weight acceptance to LE in order to advance LLE.       Treatment and Education:  Educated pt on call don't fall policy     AM-PAC 6 CLICK MOBILITY  How much help from another person does this patient currently need?   1 = Unable, Total/Dependent Assistance  2 = A lot, Maximum/Moderate Assistance  3 = A little, Minimum/Contact Guard/Supervision  4 = None, Modified [place]/Independent    Turning over in bed (including adjusting bedclothes, sheets and blankets)?: 2  Sitting down on and standing up from a chair with arms (e.g., wheelchair, bedside commode, etc.): 2  Moving from lying on back to sitting on the side of the bed?: 2  Moving to and from a bed to a chair (including a  wheelchair)?: 2  Need to walk in hospital room?: 2  Climbing 3-5 steps with a railing?: 1  Basic Mobility Total Score: 11    AM-PAC Raw Score CMS G-Code Modifier Level of Impairment Assistance   6 % Total / Unable   7 - 9 CM 80 - 100% Maximal Assist   10 - 14 CL 60 - 80% Moderate Assist   15 - 19 CK 40 - 60% Moderate Assist   20 - 22 CJ 20 - 40% Minimal Assist   23 CI 1-20% SBA / CGA   24 CH 0% Independent/ Mod I     Patient left up in chair with all lines intact, call button in reach, chair alarm on, and nursing notified.    Assessment:  Leslie Wood is a 82 y.o. female with a medical diagnosis of Hemarthrosis of right knee and presents with the impairments listed below which negatively impact functional status and puts pt at increased risk for falls. Pt demonstrating improvement in ability to tolerate EOB and OOB mobility. Pt will benefit from continued PT services to address deficits and progress toward PLOF.    Rehab identified problem list/impairments: Rehab identified problem list/impairments: weakness, impaired endurance, impaired cognition, impaired functional mobility, gait instability, decreased safety awareness, decreased lower extremity function    Rehab potential is good.    Activity tolerance: Good    Discharge recommendations: Discharge Facility/Level of Care Needs: nursing facility, skilled     Barriers to discharge:      Equipment recommendations: Equipment Needed After Discharge:  (TBD)     GOALS:   Multidisciplinary Problems       Physical Therapy Goals          Problem: Physical Therapy    Goal Priority Disciplines Outcome Goal Variances Interventions   Physical Therapy Goal     PT, PT/OT Ongoing, Progressing     Description: LT22  1. PT WILL COMPLETE BED MOBILITY WITH MIN A  2. PT WILL STAND T/F TO CHAIR WITH RW WBAT WITH RW AND MAX A  3. PT WILL COMPLETE B LE X 10 REPS TO FACILITATE INC AROM AND STRENGTHENING.                        PLAN:    Patient to be seen 3 x/week  to  address the above listed problems via gait training, therapeutic activities, therapeutic exercises, neuromuscular re-education  Plan of Care expires: 11/24/22  Plan of Care reviewed with: patient         11/14/2022

## 2022-11-14 NOTE — PROGRESS NOTES
[facility]  Orthopedics  Progress Note    Patient Name: Leslie Wood  MRN: [id number]  Admission Date: 11/10/2022  Hospital Length of Stay: 3 days  Attending Provider: Elliot Garibay MD  Primary Care Provider: Angeles Carson MD    Subjective:     Principal Problem:Hemarthrosis of right knee    Principal Orthopedic Problem:  Hemarthrosis of right knee    Interval History: Leslie Wood is an 82-year-old female who has been hospitalized since last week following trauma to her right knee.  Injury occurred 5 days ago now.  She reports that her knee is feeling better and she has become more mobile.    Review of patient's allergies indicates:   Allergen Reactions    Prazosin Other (See Comments)     dizziness    Amitriptyline     Hydrochlorothiazide      Causes muscle cramping    Lisinopril      hyperkalemia    Percocet [oxycodone-acetaminophen] Other (See Comments)     Seizures    Belbuca [buprenorphine hcl] Nausea And Vomiting and Other (See Comments)     Black out     Codeine Nausea Only and Rash       Current Facility-Administered Medications   Medication    acetaminophen tablet 650 mg    amLODIPine tablet 5 mg    bisacodyL EC tablet 5 mg    dextrose 10% bolus 125 mL    dextrose 10% bolus 250 mL    DULoxetine DR capsule 30 mg    glucagon (human recombinant) injection 1 mg    glucose chewable tablet 16 g    glucose chewable tablet 24 g    HYDROcodone-acetaminophen 7.5-325 mg per tablet 1 tablet    ketorolac tablet 10 mg    metoprolol succinate (TOPROL-XL) 24 hr tablet 50 mg    morphine injection 4 mg    naloxone injection 0.02 mg    ondansetron tablet 4 mg    sodium chloride 0.9% flush 10 mL     Objective:     Vital Signs (Most Recent):  Temp: 98.4 °F (36.9 °C) (11/14/22 0738)  Pulse: 83 (11/14/22 0738)  Resp: 13 (11/14/22 0738)  BP: (!) 157/76 (11/14/22 0738)  SpO2: 97 % (11/14/22 0738)   Vital Signs (24h Range):  Temp:  [98.1 °F (36.7 °C)-99.2 °F (37.3 °C)] 98.4 °F (36.9 °C)  Pulse:  [70-83] 83  Resp:   "[13-19] 13  SpO2:  [93 %-97 %] 97 %  BP: (126-170)/(61-76) 157/76     Weight: 58.2 kg (128 lb 4.9 oz)  Height: 5' 1" (154.9 cm)  Body mass index is 24.24 kg/m².    No intake or output data in the 24 hours ending 11/14/22 0842    Ortho/SPM Exam  Right knee:  Immobilizer was removed for physical exam   There is still a 2+ knee effusion  Minimal edema in the surrounding tissues  Moderate to severe TTP at the medial tibial plateau   ROM: Extension 0°, flexion 90°  There is some laxity and pain with valgus stress   Calf and compartments are soft and compressible  Motor exam normal   Sensation and pulses intact    GEN: Well developed, well nourished female. AAOX3. No acute distress.   Head: Normocephalic, atraumatic.   Eyes: HENRI  Neck: Trachea is midline, no adenopathy  Resp: Breathing unlabored.  Neuro: Motor function normal, Cranial nerves intact  Psych: Mood and affect appropriate.      Significant Labs:   Recent Lab Results       None          All pertinent labs within the past 24 hours have been reviewed.    Significant Imaging: I have reviewed and interpreted all pertinent imaging results/findings.    Assessment/Plan:     Active Diagnoses:    Diagnosis Date Noted POA    PRINCIPAL PROBLEM:  Hemarthrosis of right knee [M25.061] 11/10/2022 Unknown    Effusion of right knee [M25.461] 11/10/2022 Unknown    Intractable pain [R52] 11/10/2022 Unknown    Essential hypertension [I10] 07/30/2015 Yes     Chronic      Problems Resolved During this Admission:     Assessment:  82-year-old female with right knee effusion following the trial 5 days ago.  This is due to her end-stage right knee osteoarthritis and likely occult medial tibial plateau fracture     Plan:  Patient may weightbear as tolerated, but she should use the knee immobilizer for this   Continue to work with PT/OT for gait training and ADLs   Patient is a good candidate for inpatient rehab  We will follow up with her in our clinic and will likely refer her to Dr." Fred for right total knee arthroplasty once her pain has improved    Luis E Hdez PA-C  Orthopedics  [facility]

## 2022-11-14 NOTE — PLAN OF CARE
Cm spoke with patients daughter regarding SNF placement. Preferences obtained for [facility], [facility], and [facility], Referrals sent via [facility], pending accepting SNF.

## 2022-11-14 NOTE — PROGRESS NOTES
[facility] Medicine  Telemedicine Progress Note    Patient Name: Leslie Wood  MRN: [id number]  Patient Class: IP- Inpatient   Admission Date: 11/10/2022  Length of Stay: 3 days  Attending Physician: Elliot Garibay MD  Primary Care Provider: Angeles Carson MD          Subjective:     Principal Problem:Hemarthrosis of right knee        HPI:  Leslie Wood is an 82-year-old female with past medical history significant for hypertension, hyperlipidemia, diabetes, vitamin-D deficiency, anxiety, chronic back pain, osteopenia, CVA, and stress-induced cardiomyopathy who is admitted for right knee pain and swelling following a possible unwitnessed fall.  She has severe arthritis of the right knee and is a patient of Dr. Kvng Cazares.  Patient was last seen by Olivia Garcia PA-C on 09/20/2022 and received corticosteroid injection to the right knee.  Patient's daughter is with her in the emergency department and reports that she was in the other room when the injury occurred.  She heard a loud crash and thinks that her mother either fell or walked into a closed door.  Patient complains of severe pain and swelling to the right knee.  Patient has a history of low back problems that radiates into lower extremities.  Patient denies change in her motor function or sensation at this time.  Patient takes aspirin, but she is not currently on any other blood thinners         Overview/Hospital Course:  11/10     Examination done at bedside, patient alert and oriented, stated improvement in pain after undergoing Ortho drainage;  Ortho evaluated and  stated that since the patient received corticosteroid injection just 6 weeks ago they recommended only aspirate the joint at this time.  Aspiration was performed and 64 cc of bloody fluid was removed.  Patient should be placed into a knee immobilizer and follow-up with Ortho Trauma Clinic in 7-10 days.  Patient is ready for discharge from orthopedic  standpoint.  PT evaluated and recommended skilled nursing facility, however as per  patient does not qualify for facility at this time based on her insurance since patient does not meet 3 overnight criteria to stay in the hospital, discussed about the plan with the patient and daughter at bedside, they opted for home with home therapy- ordered home with home therapy accordingly.    Ordered pain medications, recommended on follow-up visits.    Labs reviewed;  Considering clinical and hemodynamic stability, planning to discharge patient today.    Patient and daughter agreed to the plan.    Charge nurse arranging on transport.    11/1/22 - Family and patient verbalized severe pain to fractured right knee, decreased function immobility. Pt desires discharge to Skilled Nursing Facility. Orthopedics plans for bracing the right knee with healing of the occult fracture. She will need a cardiology work up the then right total knee replaced. Have discussed that also with Dr Ibarra.  Since she had a intra-articular steroid injection done in September, likely no total knee replacement until late December or January.  consulted to assist with SNF placement. Pt will follow up with Ortho Trauma clinic in 7 to 10 days.       Interval History: doing well, working with therapy. [facility]ed to [facility] and anticipate discharge there tomorrow.     Review of Systems   Constitutional:  Positive for activity change. Negative for chills, fatigue and fever.   HENT: Negative.     Respiratory:  Negative for cough and shortness of breath.    Cardiovascular:  Negative for chest pain.   Gastrointestinal:  Negative for abdominal pain, diarrhea, nausea and vomiting.   Genitourinary: Negative.    Musculoskeletal:  Positive for arthralgias, gait problem and myalgias.   Skin:  Positive for color change.        Multiple ecchymosis to right face   Psychiatric/Behavioral: Negative.     Objective:     Vital Signs (Most  Recent):  Temp: 98.3 °F (36.8 °C) (11/14/22 1205)  Pulse: 69 (11/14/22 1205)  Resp: 17 (11/14/22 1205)  BP: (!) 154/70 (11/14/22 1205)  SpO2: (!) 94 % (11/14/22 1205)   Vital Signs (24h Range):  Temp:  [98.1 °F (36.7 °C)-99.2 °F (37.3 °C)] 98.3 °F (36.8 °C)  Pulse:  [69-83] 69  Resp:  [13-19] 17  SpO2:  [93 %-97 %] 94 %  BP: (126-170)/(61-76) 154/70     Weight: 58.2 kg (128 lb 4.9 oz)  Body mass index is 24.24 kg/m².    Intake/Output Summary (Last 24 hours) at 11/14/2022 1416  Last data filed at 11/14/2022 1309  Gross per 24 hour   Intake 300 ml   Output 300 ml   Net 0 ml        Physical Exam  Vitals reviewed.   HENT:      Head: Normocephalic.   Eyes:      Conjunctiva/sclera: Conjunctivae normal.   Pulmonary:      Effort: Pulmonary effort is normal.      Breath sounds: Normal breath sounds.   Musculoskeletal:      Right knee: Swelling, ecchymosis and bony tenderness present. No deformity. Decreased range of motion.   Skin:     Comments: Multiple bruises to the right face and right upper arm   Neurological:      General: No focal deficit present.      Mental Status: She is alert and oriented to person, place, and time.   Psychiatric:         Thought Content: Thought content normal.       Significant Labs: All pertinent labs within the past 24 hours have been reviewed.  CBC:   No results for input(s): WBC, HGB, HCT, PLT in the last 48 hours.    CMP:   No results for input(s): NA, K, CL, CO2, GLU, BUN, CREATININE, CALCIUM, PROT, ALBUMIN, BILITOT, ALKPHOS, AST, ALT, ANIONGAP, EGFRNONAA in the last 48 hours.    Invalid input(s): ESTGFAFRICA      Significant Imaging: I have reviewed all pertinent imaging results/findings within the past 24 hours.      Assessment/Plan:      * Hemarthrosis of right knee  ortho evaluated and aspiration was done.    Aspirin withheld given hemarthrosis, significant bruises throughout the body.        Intractable pain  Prn analgesia      Effusion of right knee  Ortho eval and Aspiration was  performed and 64 cc of bloody fluid was removed.    Patient should be placed into a knee immobilizer and follow-up with Ortho Trauma Clinic in 7-10 days.   Cleared for discharge from ortho standpoint.    PT OT evaluated and recommended skilled nursing place facility placement, follow-up with .          Essential hypertension  Resume home meds  controlled        VTE Risk Mitigation (From admission, onward)         Ordered     IP VTE HIGH RISK PATIENT  Once         11/10/22 1128     Place sequential compression device  Until discontinued         11/10/22 1128                      I have completed this tele-visit with the assistance of a telepresenter.    The attending portion of this evaluation, treatment, and documentation was performed per Elliot Garibay MD via Telemedicine AudioVisual using the secure [employer] software platform with 2 way audio/video. The provider was located off-site and the patient is located in the hospital. The aforementioned video software was utilized to document the relevant history and physical exam    Elliot Garibay MD  Department of Hospital Medicine   [facility]

## 2022-11-14 NOTE — PT/OT/SLP PROGRESS
Occupational Therapy  Treatment    Leslie Wood   MRN: [id number]   Admitting Diagnosis: Hemarthrosis of right knee    OT Date of Treatment: 11/14/22   OT Start Time: 1545  OT Stop Time: 1610  OT Total Time (min): 25 min    Billable Minutes:  Therapeutic Activity 25 MINUTES    OT/RAJWINDER: OT          General Precautions: Standard, fall  Orthopedic Precautions: RLE weight bearing as tolerated  Braces: N/A  Respiratory Status: Room air         Subjective:  Communicated with  NURSE AND Epic CHART REVIEW prior to session.    Pain/Comfort  Pain Rating 1: 0/10    Objective:        Functional Mobility:  Bed Mobility:  MOD A WITH ROLLING R<L   MOD A WITH SUPINE<SIT    MOD A WITH SCOOTING FORWARD    Transfers:   MOD A WITH MAX VC'S CUES FOR HAND PLACEMENT          Activities of Daily Living:  LE MAX A WITH LATASHA KNEE BRACE. PT EDUCATED ON LE AND VERBALIZED UNDERSTANDING  Balance:   Static Sit: FAIR: Maintains without assist, but unable to take any challenges   Dynamic Sit: POOR+  Static Stand: POOR: Needs MODERATE assist to maintain  Dynamic stand: POOR: Needs MOD (moderate) assist during gait    Therapeutic Activities and Exercises:  Patient educated on role of OT in acute setting and benefits of participation. Educated on techniques to use to increase independence and decrease fall risk with functional transfers. Educated on importance of OOB activity and calling for A to transfer back to bed.  Patient stated understanding and in agreement with POC.      AM-PAC 6 CLICK ADL   How much help from another person does this patient currently need?   1 = Unable, Total/Dependent Assistance  2 = A lot, Maximum/Moderate Assistance  3 = A little, Minimum/Contact Guard/Supervision  4 = None, Modified [place]/Independent    Putting on and taking off regular lower body clothing? : 2  Bathing (including washing, rinsing, drying)?: 2  Toileting, which includes using toilet, bedpan, or urinal? : 2  Putting on and taking off regular upper  "body clothing?: 2  Taking care of personal grooming such as brushing teeth?: 2  Eating meals?: 2  Daily Activity Total Score: 12     AM-PAC Raw Score CMS "G-Code Modifier Level of Impairment Assistance   6 % Total / Unable   7 - 8 CM 80 - 100% Maximal Assist   9-13 CL 60 - 80% Moderate Assist   14 - 19 CK 40 - 60% Moderate Assist   20 - 22 CJ 20 - 40% Minimal Assist   23 CI 1-20% SBA / CGA   24 CH 0% Independent/ Mod I       Patient left up in chair with all lines intact, call button in reach, chair alarm on, and NURSE ANSON notified    ASSESSMENT:  Leslie Wood is a 82 y.o. female with a medical diagnosis of Hemarthrosis of right knee and presents with DEBILITY AND GENERALIZED WEAKNESS  .    Rehab identified problem list/impairments: Rehab identified problem list/impairments: weakness, gait instability, decreased upper extremity function, impaired endurance, impaired balance, impaired self care skills, impaired functional mobility    Rehab potential is good.    Activity tolerance: Good    Discharge recommendations: Discharge Facility/Level of Care Needs: nursing facility, skilled     Barriers to discharge:      Equipment recommendations: none     GOALS:   Multidisciplinary Problems       Occupational Therapy Goals          Problem: Occupational Therapy    Goal Priority Disciplines Outcome Interventions   Occupational Therapy Goal     OT, PT/OT Ongoing, Progressing    Description: Goals to be met by: 11/24/22     Patient will increase functional independence with ADLs by performing:    Toileting from bedside commode with Minimal Assistance for hygiene and clothing management.   Stand pivot transfers with Moderate Assistance with RW.  Upper extremity exercise program x20 reps per handout, with assistance as needed.                         Plan:  Patient to be seen 2 x/week to address the above listed problems via self-care/home management, therapeutic activities, therapeutic exercises  Plan of Care " expires: 11/24/22  Plan of Care reviewed with: patient         11/14/2022

## 2022-11-15 ENCOUNTER — TELEPHONE (OUTPATIENT)
Dept: ORTHOPEDICS | Facility: CLINIC | Age: 82
End: 2022-11-15
Payer: MEDICARE

## 2022-11-15 VITALS
RESPIRATION RATE: 16 BRPM | SYSTOLIC BLOOD PRESSURE: 137 MMHG | TEMPERATURE: 98 F | HEART RATE: 77 BPM | HEIGHT: 61 IN | BODY MASS INDEX: 24.22 KG/M2 | OXYGEN SATURATION: 94 % | WEIGHT: 128.31 LBS | DIASTOLIC BLOOD PRESSURE: 77 MMHG

## 2022-11-15 PROCEDURE — 25000003 PHARM REV CODE 250: Performed by: STUDENT IN AN ORGANIZED HEALTH CARE EDUCATION/TRAINING PROGRAM

## 2022-11-15 PROCEDURE — 25000003 PHARM REV CODE 250: Performed by: FAMILY MEDICINE

## 2022-11-15 RX ORDER — TRAMADOL HYDROCHLORIDE 50 MG/1
50 TABLET ORAL EVERY 12 HOURS PRN
Qty: 10 TABLET | Refills: 0 | Status: ON HOLD | OUTPATIENT
Start: 2022-11-15 | End: 2022-12-19 | Stop reason: HOSPADM

## 2022-11-15 RX ORDER — TAPENTADOL HYDROCHLORIDE 50 MG/1
50 TABLET, FILM COATED ORAL EVERY 8 HOURS PRN
Qty: 10 TABLET | Refills: 0 | Status: ON HOLD | OUTPATIENT
Start: 2022-11-15 | End: 2022-12-19 | Stop reason: SDUPTHER

## 2022-11-15 RX ADMIN — AMLODIPINE BESYLATE 5 MG: 5 TABLET ORAL at 09:11

## 2022-11-15 RX ADMIN — DULOXETINE 30 MG: 30 CAPSULE, DELAYED RELEASE ORAL at 09:11

## 2022-11-15 RX ADMIN — METOPROLOL SUCCINATE 50 MG: 50 TABLET, EXTENDED RELEASE ORAL at 09:11

## 2022-11-15 NOTE — PLAN OF CARE
Patient remained free from injury. Family at bedside. No complaints of pain. 12hr chart check complete.

## 2022-11-15 NOTE — PROGRESS NOTES
[facility]  Orthopedics  Progress Note    Patient Name: Leslie Wood  MRN: [id number]  Admission Date: 11/10/2022  Hospital Length of Stay: 4 days  Attending Provider: Elliot Garibay MD  Primary Care Provider: Angeles Carson MD    Subjective:     Principal Problem:Hemarthrosis of right knee    Principal Orthopedic Problem:  Hemarthrosis of right knee    Interval History: Leslie Wood is an 82-year-old female has been hospitalized for 6 days now following trauma to her right knee.  Patient reports her pain continues to improve.  She is ready for discharge to SNF today    Review of patient's allergies indicates:   Allergen Reactions    Prazosin Other (See Comments)     dizziness    Amitriptyline     Hydrochlorothiazide      Causes muscle cramping    Lisinopril      hyperkalemia    Percocet [oxycodone-acetaminophen] Other (See Comments)     Seizures    Belbuca [buprenorphine hcl] Nausea And Vomiting and Other (See Comments)     Black out     Codeine Nausea Only and Rash       Current Facility-Administered Medications   Medication    acetaminophen tablet 650 mg    amLODIPine tablet 5 mg    bisacodyL EC tablet 5 mg    dextrose 10% bolus 125 mL    dextrose 10% bolus 250 mL    DULoxetine DR capsule 30 mg    glucagon (human recombinant) injection 1 mg    glucose chewable tablet 16 g    glucose chewable tablet 24 g    HYDROcodone-acetaminophen 7.5-325 mg per tablet 1 tablet    ketorolac tablet 10 mg    metoprolol succinate (TOPROL-XL) 24 hr tablet 50 mg    morphine injection 4 mg    naloxone injection 0.02 mg    ondansetron tablet 4 mg    sodium chloride 0.9% flush 10 mL     Objective:     Vital Signs (Most Recent):  Temp: 98.2 °F (36.8 °C) (11/15/22 0443)  Pulse: 64 (11/15/22 0443)  Resp: 18 (11/15/22 0443)  BP: (!) 162/70 (11/15/22 0443)  SpO2: 95 % (11/15/22 0443)   Vital Signs (24h Range):  Temp:  [97.9 °F (36.6 °C)-98.3 °F (36.8 °C)] 98.2 °F (36.8 °C)  Pulse:  [64-81] 64  Resp:  [17-18] 18  SpO2:  [94 %-95  "%] 95 %  BP: (135-167)/(66-72) 162/70     Weight: 58.2 kg (128 lb 4.9 oz)  Height: 5' 1" (154.9 cm)  Body mass index is 24.24 kg/m².      Intake/Output Summary (Last 24 hours) at 11/15/2022 0956  Last data filed at 11/15/2022 0936  Gross per 24 hour   Intake 300 ml   Output 500 ml   Net -200 ml       Ortho/SPM Exam  Right knee:  Immobilizer was removed for physical exam   There is still a 2+ knee effusion  Minimal edema in the surrounding tissues  Moderate to severe TTP at the medial tibial plateau   ROM: Extension 0°, flexion 90°  There is some laxity and pain with valgus stress   Calf and compartments are soft and compressible  Motor exam normal   Sensation and pulses intact     GEN: Well developed, well nourished female. AAOX3. No acute distress.   Head: Normocephalic, atraumatic.   Eyes: HENRI  Neck: Trachea is midline, no adenopathy  Resp: Breathing unlabored.  Neuro: Motor function normal, Cranial nerves intact  Psych: Mood and affect appropriate.      Significant Labs:   Recent Lab Results       None          All pertinent labs within the past 24 hours have been reviewed.    Significant Imaging: I have reviewed and interpreted all pertinent imaging results/findings.    Assessment/Plan:     Active Diagnoses:    Diagnosis Date Noted POA    PRINCIPAL PROBLEM:  Hemarthrosis of right knee [M25.061] 11/10/2022 Yes    Effusion of right knee [M25.461] 11/10/2022 Yes    Intractable pain [R52] 11/10/2022 Yes    Essential hypertension [I10] 07/30/2015 Yes     Chronic      Problems Resolved During this Admission:     Assessment:  82-year-old female with a right knee effusion following trauma 6 days ago, patient has end-stage right knee osteoarthritis and likely occult medial tibial plateau fracture    Plan:  Patient may weight bear as tolerated, but she should use the knee immobilizer while doing so   Continue to with PT/OT for gait training and ADLs   Patient is being discharged today to skilled nursing " facility  Patient will follow-up with us Trauma Clinic in 2 weeks    Luis E Hdez PA-C  Orthopedics  [facility]

## 2022-11-15 NOTE — PLAN OF CARE
Pt is stable on room air. Pt managing pain well with ordered meds. Knee immobilizer in place when out of bed. Pt is A&Ox4 and call light is in reach. All safety precautions in place. Chart review completed and reviewed active orders. Pending discharge.

## 2022-11-15 NOTE — TELEPHONE ENCOUNTER
Spoke with patient's granddaughter Ariane and scheduled her hospital follow up appointment. Appointment scheduled prior to hospital discharge. Understanding verbalized of appointment date, time and location.

## 2022-11-15 NOTE — PLAN OF CARE
DAMIAN notified by [facility] that they can accept the patient today if ready. Cm notified provider for dc orders.

## 2022-11-15 NOTE — PT/OT/SLP PROGRESS
Physical Therapy      Patient Name:  Leslie Wood   MRN:  [id number]    Patient not seen today secondary to discharge prior to treatment session.

## 2022-11-15 NOTE — NURSING
Pt was discharged to [facility]. Discharge paperwork given to transport. Called report to RN. IV was removed, catheter intact.

## 2022-11-15 NOTE — PLAN OF CARE
NURSING HOME ORDERS    11/15/2022  [facility]  [facility]  [id number]  [facility] [address]  Dept: [phone]  Loc: [phone]     Admit to Nursing Home:  Skilled Nursing Facility    Diagnoses:  Active Hospital Problems    Diagnosis  POA    *Hemarthrosis of right knee [M25.061]  Yes    Effusion of right knee [M25.461]  Yes    Intractable pain [R52]  Yes    Essential hypertension [I10]  Yes     Chronic      Resolved Hospital Problems   No resolved problems to display.       Patient is homebound due to:  Hemarthrosis of right knee    Allergies:  Review of patient's allergies indicates:   Allergen Reactions    Prazosin Other (See Comments)     dizziness    Amitriptyline     Hydrochlorothiazide      Causes muscle cramping    Lisinopril      hyperkalemia    Percocet [oxycodone-acetaminophen] Other (See Comments)     Seizures    Belbuca [buprenorphine hcl] Nausea And Vomiting and Other (See Comments)     Black out     Codeine Nausea Only and Rash       Vitals:  Routine    Diet: cardiac diet    Activities:   Activity as tolerated Patient may weight bear as tolerated, but she should use the knee immobilizer while doing so    Goals of Care Treatment Preferences:  Code Status: Full Code      Labs:  per facility protocol    Nursing Precautions:  Fall    Consults:   PT to evaluate and treat- 5 times a week, OT to evaluate and treat- 5 times a week, and Nutrition to evaluate and recommend diet         Medications: Discontinue all previous medication orders, if any. See new list below.     Medication List        CONTINUE taking these medications      atorvastatin 80 MG tablet  Commonly known as: LIPITOR  Take 1 tablet (80 mg total) by mouth every evening.     DULoxetine 30 MG capsule  Commonly known as: CYMBALTA  Take 30 mg by mouth once daily.     furosemide 20 MG tablet  Commonly known as: LASIX  Take 1 tablet (20 mg total) by mouth once daily.     metoprolol succinate 50 MG 24 hr  tablet  Commonly known as: TOPROL-XL  Take 1 tablet (50 mg total) by mouth once daily.     NUCYNTA 50 mg Tab  Generic drug: tapentadoL  Take 50 mg by mouth every 8 (eight) hours as needed.     ramipriL 10 MG capsule  Commonly known as: ALTACE  Take 10 mg by mouth once daily.     tiZANidine 4 MG tablet  Commonly known as: ZANAFLEX  Take 4 mg by mouth once daily.     traMADoL 50 mg tablet  Commonly known as: ULTRAM  Take 50 mg by mouth every 12 (twelve) hours as needed.            STOP taking these medications      aspirin 81 MG EC tablet  Commonly known as: ECOTRIN            ASK your doctor about these medications      acetaminophen 500 MG tablet  Commonly known as: TYLENOL  Take 1 tablet (500 mg total) by mouth every 6 (six) hours as needed for Pain.  Ask about: Should I take this medication?     ketorolac 10 mg tablet  Commonly known as: TORADOL  Take 1 tablet (10 mg total) by mouth every 6 (six) hours. for 4 days  Ask about: Should I take this medication?                Immunizations Administered as of 11/15/2022  Reviewed on 11/14/2022      Name Date Dose VIS Date Route Exp Date    COVID-19, MRNA, LN-S, PF (Moderna) 3/5/2021 0.5 mL -- Intramuscular --    Site: Right arm     Lot: [id number]     COVID-19, MRNA, LN-S, PF (Moderna) 2/5/2021 0.5 mL -- Intramuscular --    Site: Right arm     Lot: [id number]             This patient has had both covid vaccinations    Some patients may experience side effects after vaccination.  These may include fever, headache, muscle or joint aches.  Most symptoms resolve with 24-48 hours and do not require urgent medical evaluation unless they persist for more than 72 hours or symptoms are concerning for an unrelated medical condition.          _________________________________  Elliot Garibay MD  11/15/2022

## 2022-11-16 NOTE — PLAN OF CARE
[facility]  Discharge Final Note    Primary Care Provider: Angeles Carson MD    Expected Discharge Date: 11/15/2022    Final Discharge Note (most recent)       Final Note - 11/16/22 0922          Final Note    Assessment Type Final Discharge Note     Anticipated Discharge Disposition Skilled Nursing Facility     Hospital Resources/Appts/Education Provided Provided patient/caregiver with written discharge plan information;Appointments scheduled and added to AVS        Post-Acute Status    Discharge Delays None known at this time                     Important Message from Medicare  Important Message from Medicare regarding Discharge Appeal Rights: Given to patient/caregiver, Explained to patient/caregiver, Signed/date by patient/caregiver     Date IMM was signed: 11/15/22  Time IMM was signed: 1258    Contact Info       [facility]  [place]   Specialty: Home Health Services    [facility]  [place] [address]   Phone: [phone]       Next Steps: Follow up    Instructions: Home Health, As needed    Albert Ryan MD   Specialty: Orthopedic Surgery    [address] [place] [address] [address] [place] [address]   Phone: [phone]       Next Steps: Follow up    Instructions: Patient should be placed into a knee immobilizer and follow-up with Ortho Trauma Clinic in 7-10 days.    Albert Ryan MD   Specialty: Orthopedic Surgery    [place] [facility] [place] [address] [facility] [address]       Next Steps: Follow up    Angeles Carson MD   Specialty: Internal Medicine   Relationship: PCP - General    [address] [place] [address] [address] [place] [address]   Phone: [phone]       Next Steps: Follow up in 1 week(s)    Instructions: f/u within 1-2 weeks upon discharge          Patient to [facility] to [facility]. Transport arranged by the facility. Number for report given to the nurse. No other cm needs.

## 2022-11-30 ENCOUNTER — EXTERNAL CHRONIC CARE MANAGEMENT (OUTPATIENT)
Dept: PRIMARY CARE CLINIC | Facility: CLINIC | Age: 82
End: 2022-11-30
Payer: MEDICARE

## 2022-11-30 PROCEDURE — 99439 CHRNC CARE MGMT STAF EA ADDL: CPT | Mod: S$PBB,,, | Performed by: FAMILY MEDICINE

## 2022-11-30 PROCEDURE — 99490 CHRNC CARE MGMT STAFF 1ST 20: CPT | Mod: S$PBB,,, | Performed by: FAMILY MEDICINE

## 2022-11-30 PROCEDURE — 99490 PR CHRONIC CARE MGMT, 1ST 20 MIN: ICD-10-PCS | Mod: S$PBB,,, | Performed by: FAMILY MEDICINE

## 2022-11-30 PROCEDURE — 99490 CHRNC CARE MGMT STAFF 1ST 20: CPT | Mod: PBBFAC,25 | Performed by: FAMILY MEDICINE

## 2022-11-30 PROCEDURE — 99439 PR CHRONIC CARE MGMT, EA ADDTL 20 MIN: ICD-10-PCS | Mod: S$PBB,,, | Performed by: FAMILY MEDICINE

## 2022-11-30 PROCEDURE — 99439 CHRNC CARE MGMT STAF EA ADDL: CPT | Mod: PBBFAC,27 | Performed by: FAMILY MEDICINE

## 2022-12-06 ENCOUNTER — OFFICE VISIT (OUTPATIENT)
Dept: ORTHOPEDICS | Facility: CLINIC | Age: 82
End: 2022-12-06
Payer: MEDICARE

## 2022-12-06 VITALS
DIASTOLIC BLOOD PRESSURE: 64 MMHG | HEIGHT: 61 IN | SYSTOLIC BLOOD PRESSURE: 159 MMHG | WEIGHT: 128 LBS | BODY MASS INDEX: 24.17 KG/M2 | HEART RATE: 64 BPM

## 2022-12-06 DIAGNOSIS — M17.11 PRIMARY OSTEOARTHRITIS OF RIGHT KNEE: Primary | ICD-10-CM

## 2022-12-06 PROCEDURE — 99999 PR PBB SHADOW E&M-EST. PATIENT-LVL III: ICD-10-PCS | Mod: PBBFAC,,, | Performed by: PHYSICIAN ASSISTANT

## 2022-12-06 PROCEDURE — 99214 PR OFFICE/OUTPT VISIT, EST, LEVL IV, 30-39 MIN: ICD-10-PCS | Mod: S$PBB,,, | Performed by: PHYSICIAN ASSISTANT

## 2022-12-06 PROCEDURE — 99213 OFFICE O/P EST LOW 20 MIN: CPT | Mod: PBBFAC | Performed by: PHYSICIAN ASSISTANT

## 2022-12-06 PROCEDURE — 99214 OFFICE O/P EST MOD 30 MIN: CPT | Mod: S$PBB,,, | Performed by: PHYSICIAN ASSISTANT

## 2022-12-06 PROCEDURE — 95816 EEG AWAKE AND DROWSY: CPT | Mod: 26,,, | Performed by: PSYCHIATRY & NEUROLOGY

## 2022-12-06 PROCEDURE — 99999 PR PBB SHADOW E&M-EST. PATIENT-LVL III: CPT | Mod: PBBFAC,,, | Performed by: PHYSICIAN ASSISTANT

## 2022-12-06 PROCEDURE — 95816 PR EEG,W/AWAKE & DROWSY RECORD: ICD-10-PCS | Mod: 26,,, | Performed by: PSYCHIATRY & NEUROLOGY

## 2022-12-08 NOTE — PROGRESS NOTES
Subjective:      Patient ID: Leslie Wood is a 82 y.o. female.    Chief Complaint: No chief complaint on file.      HPI: Leslie Wood is an 82-year-old female in clinic today for follow-up of right knee hemarthrosis.  Patient was seen for this problem while admitted about 3 weeks ago.  Knee was drained reports some relief.  She does have severe osteoarthritis and has been seen in the past for this problem and received various injections    Past Medical History:   Diagnosis Date    Arthritis     Cataract     GERD (gastroesophageal reflux disease)     Heart attack 05/18/2022    Heart attack 05/23/2022    Hyperlipidemia     Hypertension     Stroke        Current Outpatient Medications:     DULoxetine (CYMBALTA) 30 MG capsule, Take 30 mg by mouth once daily., Disp: , Rfl:     furosemide (LASIX) 20 MG tablet, Take 1 tablet (20 mg total) by mouth once daily., Disp: 30 tablet, Rfl: 11    metoprolol succinate (TOPROL-XL) 50 MG 24 hr tablet, Take 1 tablet (50 mg total) by mouth once daily., Disp: 30 tablet, Rfl: 11    NUCYNTA 50 mg Tab, Take 1 tablet (50 mg total) by mouth every 8 (eight) hours as needed (severe pain)., Disp: 10 tablet, Rfl: 0    ramipriL (ALTACE) 10 MG capsule, Take 10 mg by mouth once daily., Disp: , Rfl:     tiZANidine (ZANAFLEX) 4 MG tablet, Take 4 mg by mouth once daily., Disp: , Rfl:     traMADoL (ULTRAM) 50 mg tablet, Take 1 tablet (50 mg total) by mouth every 12 (twelve) hours as needed (moderate pain)., Disp: 10 tablet, Rfl: 0    atorvastatin (LIPITOR) 80 MG tablet, Take 1 tablet (80 mg total) by mouth every evening., Disp: 90 tablet, Rfl: 3  Review of patient's allergies indicates:   Allergen Reactions    Prazosin Other (See Comments)     dizziness    Amitriptyline     Hydrochlorothiazide      Causes muscle cramping    Lisinopril      hyperkalemia    Percocet [oxycodone-acetaminophen] Other (See Comments)     Seizures    Belbuca [buprenorphine hcl] Nausea And Vomiting and Other (See Comments)      "Black out     Codeine Nausea Only and Rash       BP (!) 159/64   Pulse 64   Ht 5' 1" (1.549 m)   Wt 58.1 kg (128 lb)   BMI 24.19 kg/m²     ROS      Objective:    Ortho Exam   Right knee:  Skin is intact   Minimal effusion   Mild edema in the surrounding tissues   Moderate TTP of the medial tibial plateau   ROM:  Extension 0 degrees, flexion 100 degrees   Calf and compartments are soft and compressible  Motor exam normal   Sensation and pulses intact    GEN: Well developed, well nourished female. AAOX3. No acute distress.   Head: Normocephalic, atraumatic.   Eyes: HENRI  Neck: Trachea is midline, no adenopathy  Resp: Breathing unlabored.  Neuro: Motor function normal, Cranial nerves intact  Psych: Mood and affect appropriate.       Assessment:     Imaging:  X-ray of the right knee obtained in the emergency department was reviewed and is significant for severe tricompartmental degenerative changes with osteophytes sclerosis, loss of joint space, and osteophyte formation.  There is also a large suprapatellar joint effusion.  No acute fracture or dislocation        1. Primary osteoarthritis of right knee          Plan:       Once again reviewed the radiographs with the patient.  Discussed management of osteoarthritis with the patient.  She states that she would like to return to see Olivia Garcia PA-C and have her resume treatment of this condition.  Patient may follow-up in this clinic as needed       Follow up if symptoms worsen or fail to improve.          Patient note was created using MModal Dictation.  Any errors in syntax or even information may not have been identified and edited on initial review prior to signing this note.    "

## 2022-12-14 ENCOUNTER — TELEPHONE (OUTPATIENT)
Dept: ORTHOPEDICS | Facility: CLINIC | Age: 82
End: 2022-12-14
Payer: MEDICARE

## 2022-12-14 NOTE — TELEPHONE ENCOUNTER
Called patient regarding appointment request. Let patient's granddaughter know that SARA Baekr was out on maternity leave. Granddaughter reported they were advised that patient needed a knee replacement. Provided name of provider that performs this procedure. Patient is going to reach out to their staff.

## 2022-12-15 ENCOUNTER — HOSPITAL ENCOUNTER (INPATIENT)
Facility: HOSPITAL | Age: 82
LOS: 3 days | Discharge: NURSING, REHAB OR LONG-TERM CARE | DRG: 312 | End: 2022-12-19
Attending: EMERGENCY MEDICINE | Admitting: INTERNAL MEDICINE
Payer: MEDICARE

## 2022-12-15 ENCOUNTER — TELEPHONE (OUTPATIENT)
Dept: ORTHOPEDICS | Facility: CLINIC | Age: 82
End: 2022-12-15
Payer: MEDICARE

## 2022-12-15 DIAGNOSIS — I10 ESSENTIAL HYPERTENSION: Chronic | ICD-10-CM

## 2022-12-15 DIAGNOSIS — R55 SYNCOPE AND COLLAPSE: ICD-10-CM

## 2022-12-15 DIAGNOSIS — R41.0 DISORIENTATION: ICD-10-CM

## 2022-12-15 DIAGNOSIS — R00.1 BRADYCARDIA: ICD-10-CM

## 2022-12-15 DIAGNOSIS — I51.81 STRESS-INDUCED CARDIOMYOPATHY: ICD-10-CM

## 2022-12-15 DIAGNOSIS — I45.9 STOKES-ADAMS SYNCOPE: ICD-10-CM

## 2022-12-15 DIAGNOSIS — R00.1 SYMPTOMATIC BRADYCARDIA: Primary | ICD-10-CM

## 2022-12-15 LAB
ALBUMIN SERPL BCP-MCNC: 3.5 G/DL (ref 3.5–5.2)
ALP SERPL-CCNC: 80 U/L (ref 55–135)
ALT SERPL W/O P-5'-P-CCNC: 15 U/L (ref 10–44)
ANION GAP SERPL CALC-SCNC: 11 MMOL/L (ref 8–16)
AST SERPL-CCNC: 21 U/L (ref 10–40)
BACTERIA #/AREA URNS HPF: NORMAL /HPF
BASOPHILS # BLD AUTO: 0.04 K/UL (ref 0–0.2)
BASOPHILS NFR BLD: 0.5 % (ref 0–1.9)
BILIRUB SERPL-MCNC: 1 MG/DL (ref 0.1–1)
BILIRUB UR QL STRIP: NEGATIVE
BUN SERPL-MCNC: 18 MG/DL (ref 8–23)
CALCIUM SERPL-MCNC: 9.7 MG/DL (ref 8.7–10.5)
CHLORIDE SERPL-SCNC: 103 MMOL/L (ref 95–110)
CLARITY UR: CLEAR
CO2 SERPL-SCNC: 29 MMOL/L (ref 23–29)
COLOR UR: COLORLESS
CREAT SERPL-MCNC: 0.7 MG/DL (ref 0.5–1.4)
DIFFERENTIAL METHOD: ABNORMAL
EOSINOPHIL # BLD AUTO: 0.1 K/UL (ref 0–0.5)
EOSINOPHIL NFR BLD: 1.5 % (ref 0–8)
ERYTHROCYTE [DISTWIDTH] IN BLOOD BY AUTOMATED COUNT: 14.6 % (ref 11.5–14.5)
EST. GFR  (NO RACE VARIABLE): >60 ML/MIN/1.73 M^2
ESTIMATED AVG GLUCOSE: 105 MG/DL (ref 68–131)
GLUCOSE SERPL-MCNC: 100 MG/DL (ref 70–110)
GLUCOSE UR QL STRIP: NEGATIVE
HBA1C MFR BLD: 5.3 % (ref 4–5.6)
HCT VFR BLD AUTO: 41.6 % (ref 37–48.5)
HGB BLD-MCNC: 13.4 G/DL (ref 12–16)
HGB UR QL STRIP: NEGATIVE
IMM GRANULOCYTES # BLD AUTO: 0.02 K/UL (ref 0–0.04)
IMM GRANULOCYTES NFR BLD AUTO: 0.2 % (ref 0–0.5)
KETONES UR QL STRIP: NEGATIVE
LEUKOCYTE ESTERASE UR QL STRIP: ABNORMAL
LYMPHOCYTES # BLD AUTO: 2.2 K/UL (ref 1–4.8)
LYMPHOCYTES NFR BLD: 26.6 % (ref 18–48)
MCH RBC QN AUTO: 29.5 PG (ref 27–31)
MCHC RBC AUTO-ENTMCNC: 32.2 G/DL (ref 32–36)
MCV RBC AUTO: 91 FL (ref 82–98)
MICROSCOPIC COMMENT: NORMAL
MONOCYTES # BLD AUTO: 0.9 K/UL (ref 0.3–1)
MONOCYTES NFR BLD: 10.6 % (ref 4–15)
NEUTROPHILS # BLD AUTO: 4.9 K/UL (ref 1.8–7.7)
NEUTROPHILS NFR BLD: 60.6 % (ref 38–73)
NITRITE UR QL STRIP: NEGATIVE
NRBC BLD-RTO: 0 /100 WBC
PH UR STRIP: 7 [PH] (ref 5–8)
PLATELET # BLD AUTO: 249 K/UL (ref 150–450)
PMV BLD AUTO: 9.6 FL (ref 9.2–12.9)
POCT GLUCOSE: 81 MG/DL (ref 70–110)
POCT GLUCOSE: 85 MG/DL (ref 70–110)
POTASSIUM SERPL-SCNC: 4 MMOL/L (ref 3.5–5.1)
PROT SERPL-MCNC: 6.9 G/DL (ref 6–8.4)
PROT UR QL STRIP: NEGATIVE
RBC # BLD AUTO: 4.55 M/UL (ref 4–5.4)
SODIUM SERPL-SCNC: 143 MMOL/L (ref 136–145)
SP GR UR STRIP: 1.01 (ref 1–1.03)
SQUAMOUS #/AREA URNS HPF: 2 /HPF
TROPONIN I SERPL DL<=0.01 NG/ML-MCNC: 0.02 NG/ML (ref 0–0.03)
URN SPEC COLLECT METH UR: ABNORMAL
UROBILINOGEN UR STRIP-ACNC: NEGATIVE EU/DL
WBC # BLD AUTO: 8.08 K/UL (ref 3.9–12.7)
WBC #/AREA URNS HPF: 3 /HPF (ref 0–5)

## 2022-12-15 PROCEDURE — 93005 ELECTROCARDIOGRAM TRACING: CPT

## 2022-12-15 PROCEDURE — 63600175 PHARM REV CODE 636 W HCPCS: Performed by: INTERNAL MEDICINE

## 2022-12-15 PROCEDURE — 93010 ELECTROCARDIOGRAM REPORT: CPT | Mod: ,,, | Performed by: INTERNAL MEDICINE

## 2022-12-15 PROCEDURE — 85025 COMPLETE CBC W/AUTO DIFF WBC: CPT | Performed by: EMERGENCY MEDICINE

## 2022-12-15 PROCEDURE — 80053 COMPREHEN METABOLIC PANEL: CPT | Performed by: EMERGENCY MEDICINE

## 2022-12-15 PROCEDURE — 99220 PR INITIAL OBSERVATION CARE,LEVL III: ICD-10-PCS | Mod: ,,, | Performed by: INTERNAL MEDICINE

## 2022-12-15 PROCEDURE — 96360 HYDRATION IV INFUSION INIT: CPT

## 2022-12-15 PROCEDURE — 83036 HEMOGLOBIN GLYCOSYLATED A1C: CPT | Performed by: INTERNAL MEDICINE

## 2022-12-15 PROCEDURE — G0378 HOSPITAL OBSERVATION PER HR: HCPCS

## 2022-12-15 PROCEDURE — 99220 PR INITIAL OBSERVATION CARE,LEVL III: CPT | Mod: ,,, | Performed by: INTERNAL MEDICINE

## 2022-12-15 PROCEDURE — 96361 HYDRATE IV INFUSION ADD-ON: CPT

## 2022-12-15 PROCEDURE — 25000003 PHARM REV CODE 250: Performed by: INTERNAL MEDICINE

## 2022-12-15 PROCEDURE — 99285 EMERGENCY DEPT VISIT HI MDM: CPT | Mod: 25

## 2022-12-15 PROCEDURE — 93010 EKG 12-LEAD: ICD-10-PCS | Mod: ,,, | Performed by: INTERNAL MEDICINE

## 2022-12-15 PROCEDURE — 84484 ASSAY OF TROPONIN QUANT: CPT | Performed by: EMERGENCY MEDICINE

## 2022-12-15 PROCEDURE — 81000 URINALYSIS NONAUTO W/SCOPE: CPT | Performed by: EMERGENCY MEDICINE

## 2022-12-15 PROCEDURE — 25000003 PHARM REV CODE 250: Performed by: EMERGENCY MEDICINE

## 2022-12-15 PROCEDURE — 36415 COLL VENOUS BLD VENIPUNCTURE: CPT | Performed by: INTERNAL MEDICINE

## 2022-12-15 PROCEDURE — 96372 THER/PROPH/DIAG INJ SC/IM: CPT | Performed by: INTERNAL MEDICINE

## 2022-12-15 RX ORDER — BISACODYL 5 MG
5 TABLET, DELAYED RELEASE (ENTERIC COATED) ORAL DAILY PRN
Status: DISCONTINUED | OUTPATIENT
Start: 2022-12-15 | End: 2022-12-19 | Stop reason: HOSPADM

## 2022-12-15 RX ORDER — GLUCAGON 1 MG
1 KIT INJECTION
Status: DISCONTINUED | OUTPATIENT
Start: 2022-12-15 | End: 2022-12-19 | Stop reason: HOSPADM

## 2022-12-15 RX ORDER — ASCORBIC ACID 500 MG
1 TABLET ORAL EVERY MORNING
COMMUNITY
End: 2023-03-05

## 2022-12-15 RX ORDER — MAG HYDROX/ALUMINUM HYD/SIMETH 200-200-20
30 SUSPENSION, ORAL (FINAL DOSE FORM) ORAL 4 TIMES DAILY PRN
Status: DISCONTINUED | OUTPATIENT
Start: 2022-12-15 | End: 2022-12-19 | Stop reason: HOSPADM

## 2022-12-15 RX ORDER — HYDRALAZINE HYDROCHLORIDE 20 MG/ML
10 INJECTION INTRAMUSCULAR; INTRAVENOUS ONCE
Status: COMPLETED | OUTPATIENT
Start: 2022-12-15 | End: 2022-12-15

## 2022-12-15 RX ORDER — ONDANSETRON 2 MG/ML
4 INJECTION INTRAMUSCULAR; INTRAVENOUS EVERY 8 HOURS PRN
Status: DISCONTINUED | OUTPATIENT
Start: 2022-12-15 | End: 2022-12-19 | Stop reason: HOSPADM

## 2022-12-15 RX ORDER — TALC
6 POWDER (GRAM) TOPICAL NIGHTLY PRN
Status: DISCONTINUED | OUTPATIENT
Start: 2022-12-15 | End: 2022-12-19 | Stop reason: HOSPADM

## 2022-12-15 RX ORDER — ATORVASTATIN CALCIUM 80 MG/1
1 TABLET, FILM COATED ORAL EVERY MORNING
COMMUNITY
End: 2023-02-20 | Stop reason: SDUPTHER

## 2022-12-15 RX ORDER — HEPARIN SODIUM 5000 [USP'U]/ML
5000 INJECTION, SOLUTION INTRAVENOUS; SUBCUTANEOUS EVERY 8 HOURS
Status: DISCONTINUED | OUTPATIENT
Start: 2022-12-15 | End: 2022-12-19 | Stop reason: HOSPADM

## 2022-12-15 RX ORDER — IBUPROFEN 200 MG
24 TABLET ORAL
Status: DISCONTINUED | OUTPATIENT
Start: 2022-12-15 | End: 2022-12-19 | Stop reason: HOSPADM

## 2022-12-15 RX ORDER — IBUPROFEN 400 MG/1
400 TABLET ORAL EVERY 6 HOURS PRN
Status: DISCONTINUED | OUTPATIENT
Start: 2022-12-15 | End: 2022-12-19 | Stop reason: HOSPADM

## 2022-12-15 RX ORDER — HYDRALAZINE HYDROCHLORIDE 20 MG/ML
20 INJECTION INTRAMUSCULAR; INTRAVENOUS ONCE
Status: COMPLETED | OUTPATIENT
Start: 2022-12-15 | End: 2022-12-15

## 2022-12-15 RX ORDER — HYDRALAZINE HYDROCHLORIDE 20 MG/ML
10 INJECTION INTRAMUSCULAR; INTRAVENOUS EVERY 8 HOURS PRN
Status: DISCONTINUED | OUTPATIENT
Start: 2022-12-15 | End: 2022-12-19 | Stop reason: HOSPADM

## 2022-12-15 RX ORDER — IBUPROFEN 200 MG
16 TABLET ORAL
Status: DISCONTINUED | OUTPATIENT
Start: 2022-12-15 | End: 2022-12-19 | Stop reason: HOSPADM

## 2022-12-15 RX ORDER — INSULIN ASPART 100 [IU]/ML
0-5 INJECTION, SOLUTION INTRAVENOUS; SUBCUTANEOUS
Status: DISCONTINUED | OUTPATIENT
Start: 2022-12-15 | End: 2022-12-19 | Stop reason: HOSPADM

## 2022-12-15 RX ADMIN — IBUPROFEN 400 MG: 400 TABLET, FILM COATED ORAL at 04:12

## 2022-12-15 RX ADMIN — HEPARIN SODIUM 5000 UNITS: 5000 INJECTION INTRAVENOUS; SUBCUTANEOUS at 09:12

## 2022-12-15 RX ADMIN — SODIUM CHLORIDE 250 ML: 0.9 INJECTION, SOLUTION INTRAVENOUS at 11:12

## 2022-12-15 RX ADMIN — MELATONIN TAB 3 MG 6 MG: 3 TAB at 09:12

## 2022-12-15 RX ADMIN — HYDRALAZINE HYDROCHLORIDE 10 MG: 20 INJECTION, SOLUTION INTRAMUSCULAR; INTRAVENOUS at 02:12

## 2022-12-15 RX ADMIN — HYDRALAZINE HYDROCHLORIDE 20 MG: 20 INJECTION, SOLUTION INTRAMUSCULAR; INTRAVENOUS at 03:12

## 2022-12-15 NOTE — SUBJECTIVE & OBJECTIVE
Past Medical History:   Diagnosis Date    Arthritis     Cataract     GERD (gastroesophageal reflux disease)     Heart attack 05/18/2022    Heart attack 05/23/2022    Hyperlipidemia     Hypertension     Stroke        Past Surgical History:   Procedure Laterality Date    ADENOIDECTOMY      ADRENAL GLAND SURGERY      APPENDECTOMY      BACK SURGERY      fusion l 4-5 s 1,2,3  fusion l 2-3    CORONARY ANGIOGRAPHY N/A 5/20/2022    Procedure: ANGIOGRAM, CORONARY ARTERY;  Surgeon: Domingo Martins MD;  Location: [facility] CATH LAB;  Service: Cardiology;  Laterality: N/A;    CORONARY ANGIOGRAPHY N/A 5/24/2022    Procedure: ANGIOGRAM, CORONARY ARTERY;  Surgeon: Domingo Martins MD;  Location: [facility] CATH LAB;  Service: Cardiology;  Laterality: N/A;    EYE SURGERY      HEMORRHOID SURGERY      HERNIA REPAIR      HYSTERECTOMY      indirect lumbar decompression      percutaneous placement of interspinous extension blocker    LEFT HEART CATHETERIZATION Left 5/20/2022    Procedure: CATHETERIZATION, HEART, LEFT;  Surgeon: Domingo Martins MD;  Location: [facility] CATH LAB;  Service: Cardiology;  Laterality: Left;    TONSILLECTOMY         Review of patient's allergies indicates:   Allergen Reactions    Acetaminophen     Amitriptyline     Hydrochlorothiazide      Causes muscle cramping    Lisinopril      hyperkalemia    Oxycodone     Percocet [oxycodone-acetaminophen] Other (See Comments)     Seizures    Belbuca [buprenorphine hcl] Nausea And Vomiting and Other (See Comments)     Black out     Codeine Nausea Only and Rash    Prazosin Other (See Comments)     dizziness       No current facility-administered medications on file prior to encounter.     Current Outpatient Medications on File Prior to Encounter   Medication Sig    ascorbic acid, vitamin C, (VITAMIN C) 500 MG tablet Take 1 tablet by mouth every morning.    atorvastatin (LIPITOR) 80 MG tablet Take 1 tablet by mouth every morning.    DULoxetine (CYMBALTA) 30 MG capsule Take 30 mg by  mouth once daily.    furosemide (LASIX) 20 MG tablet Take 1 tablet (20 mg total) by mouth once daily.    metoprolol succinate (TOPROL-XL) 50 MG 24 hr tablet Take 1 tablet (50 mg total) by mouth once daily.    ramipriL (ALTACE) 10 MG capsule Take 10 mg by mouth once daily.    NUCYNTA 50 mg Tab Take 1 tablet (50 mg total) by mouth every 8 (eight) hours as needed (severe pain).    traMADoL (ULTRAM) 50 mg tablet Take 1 tablet (50 mg total) by mouth every 12 (twelve) hours as needed (moderate pain).    [DISCONTINUED] atorvastatin (LIPITOR) 80 MG tablet Take 1 tablet (80 mg total) by mouth every evening.    [DISCONTINUED] carvediloL (COREG) 6.25 MG tablet Take 1 tablet (6.25 mg total) by mouth 2 (two) times daily. TAKE (1) TABLET BY MOUTH 2 TIMES A DAY WITH MEALS    [DISCONTINUED] cloNIDine (CATAPRES) 0.1 MG tablet Take 1 tablet (0.1 mg total) by mouth 2 (two) times daily. To take an extra dose if BP >160/90    [DISCONTINUED] diclofenac sodium (VOLTAREN) 1 % Gel Apply 2 g topically 3 (three) times daily.    [DISCONTINUED] isosorbide mononitrate (IMDUR) 30 MG 24 hr tablet TAKE 1 TABLET BY MOUTH TWICE A DAY    [DISCONTINUED] metoprolol tartrate (LOPRESSOR) 25 MG tablet Take 1 tablet (25 mg total) by mouth 3 (three) times daily.    [DISCONTINUED] nitroGLYCERIN (NITROSTAT) 0.4 MG SL tablet Place 1 tablet (0.4 mg total) under the tongue every 5 (five) minutes as needed for Chest pain.    [DISCONTINUED] tiZANidine (ZANAFLEX) 4 MG tablet Take 4 mg by mouth once daily.    [DISCONTINUED] valsartan (DIOVAN) 160 MG tablet TAKE 1 TABLET BY MOUTH TWICE A DAY     Family History       Problem Relation (Age of Onset)    Breast cancer Sister    Diabetes Mother    Heart disease Mother    Hypertension Mother, Father    Kidney disease Father          Tobacco Use    Smoking status: Never    Smokeless tobacco: Never   Substance and Sexual Activity    Alcohol use: No    Drug use: No    Sexual activity: Not Currently     Review of Systems    Constitutional:  Negative for chills and fever.   Respiratory:  Negative for shortness of breath.    Cardiovascular:  Negative for chest pain and palpitations.   Neurological:  Positive for syncope.   All other systems reviewed and are negative.  Objective:     Vital Signs (Most Recent):  Temp: 98.3 °F (36.8 °C) (12/15/22 1454)  Pulse: 61 (12/15/22 1454)  Resp: 20 (12/15/22 1454)  BP: (!) 218/79 (12/15/22 1454)  SpO2: 98 % (12/15/22 1454)   Vital Signs (24h Range):  Temp:  [98.3 °F (36.8 °C)-98.9 °F (37.2 °C)] 98.3 °F (36.8 °C)  Pulse:  [47-84] 61  Resp:  [16-20] 20  SpO2:  [96 %-98 %] 98 %  BP: (145-218)/(70-90) 218/79     Weight: 59 kg (130 lb)  Body mass index is 24.56 kg/m².    Physical Exam  HENT:      Head: Normocephalic and atraumatic.   Cardiovascular:      Rate and Rhythm: Regular rhythm. Bradycardia present.      Heart sounds: No murmur heard.  Pulmonary:      Effort: Pulmonary effort is normal. No respiratory distress.      Breath sounds: Normal breath sounds. No wheezing.   Abdominal:      General: Bowel sounds are normal. There is no distension.      Palpations: Abdomen is soft.      Tenderness: There is no abdominal tenderness.   Musculoskeletal:         General: No swelling.   Skin:     General: Skin is warm and dry.   Neurological:      Mental Status: She is alert and oriented to person, place, and time. Mental status is at baseline.           Significant Labs: All pertinent labs within the past 24 hours have been reviewed.  CBC:   Recent Labs   Lab 12/15/22  1128   WBC 8.08   HGB 13.4   HCT 41.6        CMP:   Recent Labs   Lab 12/15/22  1128      K 4.0      CO2 29      BUN 18   CREATININE 0.7   CALCIUM 9.7   PROT 6.9   ALBUMIN 3.5   BILITOT 1.0   ALKPHOS 80   AST 21   ALT 15   ANIONGAP 11       Significant Imaging: I have reviewed all pertinent imaging results/findings within the past 24 hours.

## 2022-12-15 NOTE — ED PROVIDER NOTES
SCRIBE #1 NOTE: I, Lisa Martínez, am scribing for, and in the presence of, Valarie Hendricks MD. I have scribed the entire note.      History      Chief Complaint   Patient presents with    Loss of Consciousness     Patient currently at [facility] for rehab, staff states she has had several syncopal episodes this week         Review of patient's allergies indicates:   Allergen Reactions    Acetaminophen     Amitriptyline     Hydrochlorothiazide      Causes muscle cramping    Lisinopril      hyperkalemia    Oxycodone     Percocet [oxycodone-acetaminophen] Other (See Comments)     Seizures    Belbuca [buprenorphine hcl] Nausea And Vomiting and Other (See Comments)     Black out     Codeine Nausea Only and Rash    Prazosin Other (See Comments)     dizziness        HPI   HPI    12/15/2022, 1:55 PM   History obtained from the patient      History of Present Illness: Leslie Wood is a 82 y.o. female patient who presents to the Emergency Department for syncopic episodes which onset gradually over the last few month, however, this is the first time she has had two episodes in one week. Symptoms are constant and moderate in severity. No mitigating or exacerbating factors reported. No associated sxs noted. Patient denies any injury, weakness, lightheadedness, headache, fever, chills, and all other sxs at this time. No prior Tx noted. No further complaints or concerns at this time.        Arrival mode: EMS    PCP: Angeles Carson MD       Past Medical History:  Past Medical History:   Diagnosis Date    Arthritis     Cataract     GERD (gastroesophageal reflux disease)     Heart attack 05/18/2022    Heart attack 05/23/2022    Hyperlipidemia     Hypertension     Stroke        Past Surgical History:  Past Surgical History:   Procedure Laterality Date    ADENOIDECTOMY      ADRENAL GLAND SURGERY      APPENDECTOMY      BACK SURGERY      fusion l 4-5 s 1,2,3  fusion l 2-3    CORONARY ANGIOGRAPHY N/A 5/20/2022     Procedure: ANGIOGRAM, CORONARY ARTERY;  Surgeon: Domingo Martins MD;  Location: [facility] CATH LAB;  Service: Cardiology;  Laterality: N/A;    CORONARY ANGIOGRAPHY N/A 5/24/2022    Procedure: ANGIOGRAM, CORONARY ARTERY;  Surgeon: Domingo Martins MD;  Location: [facility] CATH LAB;  Service: Cardiology;  Laterality: N/A;    EYE SURGERY      HEMORRHOID SURGERY      HERNIA REPAIR      HYSTERECTOMY      indirect lumbar decompression      percutaneous placement of interspinous extension blocker    LEFT HEART CATHETERIZATION Left 5/20/2022    Procedure: CATHETERIZATION, HEART, LEFT;  Surgeon: Domingo Martins MD;  Location: [facility] CATH LAB;  Service: Cardiology;  Laterality: Left;    TONSILLECTOMY           Family History:  Family History   Problem Relation Age of Onset    Heart disease Mother     Hypertension Mother     Diabetes Mother     Hypertension Father     Kidney disease Father     Breast cancer Sister        Social History:  Social History     Tobacco Use    Smoking status: Never    Smokeless tobacco: Never   Substance and Sexual Activity    Alcohol use: No    Drug use: No    Sexual activity: Not Currently       ROS   Review of Systems   Constitutional:  Negative for chills and fever.   HENT:  Negative for sore throat.    Respiratory:  Negative for shortness of breath.    Cardiovascular:  Negative for chest pain.   Gastrointestinal:  Negative for nausea.   Genitourinary:  Negative for dysuria.   Musculoskeletal:  Negative for back pain.   Skin:  Negative for rash.   Neurological:  Positive for syncope. Negative for dizziness, weakness, light-headedness and headaches.   Hematological:  Does not bruise/bleed easily.   All other systems reviewed and are negative.    Physical Exam      Initial Vitals [12/15/22 1034]   BP Pulse Resp Temp SpO2   (!) 145/90 84 16 98.9 °F (37.2 °C) 98 %      MAP       --          Physical Exam  Nursing Notes and Vital Signs Reviewed.  Constitutional: Patient is in no acute distress.  "Well-developed and well-nourished. Elderly.  Head: Atraumatic. Normocephalic.  Eyes: PERRL. EOM intact. Conjunctivae are not pale. No scleral icterus.  ENT: Mucous membranes are moist. Oropharynx is clear and symmetric.    Neck: Supple. Full ROM. No lymphadenopathy.  Cardiovascular: Regular rate. Regular rhythm. No murmurs, rubs, or gallops. Distal pulses are 2+ and symmetric.  Pulmonary/Chest: No respiratory distress. Clear to auscultation bilaterally. No wheezing or rales.  Abdominal: Soft and non-distended.  There is no tenderness.  No rebound, guarding, or rigidity. Good bowel sounds.  Genitourinary: No CVA tenderness  Musculoskeletal: Moves all extremities. No obvious deformities. No edema. No calf tenderness.  Skin: Warm and dry.  Neurological:  Alert, awake, and appropriate.  Normal speech.  No acute focal neurological deficits are appreciated.  Psychiatric: Normal affect. Good eye contact. Appropriate in content.    ED Course    Procedures  ED Vital Signs:  Vitals:    12/15/22 1034 12/15/22 1120 12/15/22 1130 12/15/22 1132   BP: (!) 145/90  (!) 184/79 (!) 181/79   Pulse: 84 (!) 48 (!) 48 (!) 48   Resp: 16  16 16   Temp: 98.9 °F (37.2 °C)      TempSrc:       SpO2: 98%  97% 97%   Weight: 59 kg (130 lb)      Height:        12/15/22 1134 12/15/22 1300 12/15/22 1405 12/15/22 1432   BP: (!) 156/70 (!) 168/89 (!) 193/81 (!) 215/88   Pulse: (!) 48 (!) 47 (!) 49    Resp: 16 20 20    Temp:       TempSrc:       SpO2: 98% 96% 96%    Weight:       Height:        12/15/22 1454 12/15/22 1500 12/15/22 1541 12/15/22 1613   BP: (!) 218/79 (!) 224/85 (!) 160/67 (!) 140/64   Pulse: 61 72 71 81   Resp: 20 18 20 20   Temp: 98.3 °F (36.8 °C)  97.6 °F (36.4 °C)    TempSrc: Oral  Oral    SpO2: 98%  95% 96%   Weight: 60.1 kg (132 lb 7.9 oz)      Height: 5' 1" (1.549 m)       12/15/22 2015   BP: (!) 148/66   Pulse: 69   Resp: 16   Temp: 98.5 °F (36.9 °C)   TempSrc: Oral   SpO2: 95%   Weight:    Height:        Abnormal Lab " Results:  Labs Reviewed   CBC W/ AUTO DIFFERENTIAL - Abnormal; Notable for the following components:       Result Value    RDW 14.6 (*)     All other components within normal limits   URINALYSIS, REFLEX TO URINE CULTURE - Abnormal; Notable for the following components:    Color, UA Colorless (*)     Leukocytes, UA 2+ (*)     All other components within normal limits    Narrative:     Specimen Source->Urine   COMPREHENSIVE METABOLIC PANEL   TROPONIN I   URINALYSIS MICROSCOPIC    Narrative:     Specimen Source->Urine        All Lab Results:  Results for orders placed or performed during the hospital encounter of 12/15/22   CBC auto differential   Result Value Ref Range    WBC 8.08 3.90 - 12.70 K/uL    RBC 4.55 4.00 - 5.40 M/uL    Hemoglobin 13.4 12.0 - 16.0 g/dL    Hematocrit 41.6 37.0 - 48.5 %    MCV 91 82 - 98 fL    MCH 29.5 27.0 - 31.0 pg    MCHC 32.2 32.0 - 36.0 g/dL    RDW 14.6 (H) 11.5 - 14.5 %    Platelets 249 150 - 450 K/uL    MPV 9.6 9.2 - 12.9 fL    Immature Granulocytes 0.2 0.0 - 0.5 %    Gran # (ANC) 4.9 1.8 - 7.7 K/uL    Immature Grans (Abs) 0.02 0.00 - 0.04 K/uL    Lymph # 2.2 1.0 - 4.8 K/uL    Mono # 0.9 0.3 - 1.0 K/uL    Eos # 0.1 0.0 - 0.5 K/uL    Baso # 0.04 0.00 - 0.20 K/uL    nRBC 0 0 /100 WBC    Gran % 60.6 38.0 - 73.0 %    Lymph % 26.6 18.0 - 48.0 %    Mono % 10.6 4.0 - 15.0 %    Eosinophil % 1.5 0.0 - 8.0 %    Basophil % 0.5 0.0 - 1.9 %    Differential Method Automated    Comprehensive metabolic panel   Result Value Ref Range    Sodium 143 136 - 145 mmol/L    Potassium 4.0 3.5 - 5.1 mmol/L    Chloride 103 95 - 110 mmol/L    CO2 29 23 - 29 mmol/L    Glucose 100 70 - 110 mg/dL    BUN 18 8 - 23 mg/dL    Creatinine 0.7 0.5 - 1.4 mg/dL    Calcium 9.7 8.7 - 10.5 mg/dL    Total Protein 6.9 6.0 - 8.4 g/dL    Albumin 3.5 3.5 - 5.2 g/dL    Total Bilirubin 1.0 0.1 - 1.0 mg/dL    Alkaline Phosphatase 80 55 - 135 U/L    AST 21 10 - 40 U/L    ALT 15 10 - 44 U/L    Anion Gap 11 8 - 16 mmol/L    eGFR >60 >60  mL/min/1.73 m^2   Urinalysis, Reflex to Urine Culture Urine, Catheterized (external)    Specimen: Urine   Result Value Ref Range    Specimen UA Urine, Catheterized     Color, UA Colorless (A) Yellow, Straw, Izabela    Appearance, UA Clear Clear    pH, UA 7.0 5.0 - 8.0    Specific Gravity, UA 1.010 1.005 - 1.030    Protein, UA Negative Negative    Glucose, UA Negative Negative    Ketones, UA Negative Negative    Bilirubin (UA) Negative Negative    Occult Blood UA Negative Negative    Nitrite, UA Negative Negative    Urobilinogen, UA Negative <2.0 EU/dL    Leukocytes, UA 2+ (A) Negative   Troponin I   Result Value Ref Range    Troponin I 0.021 0.000 - 0.026 ng/mL   Urinalysis Microscopic   Result Value Ref Range    WBC, UA 3 0 - 5 /hpf    Bacteria Rare None-Occ /hpf    Squam Epithel, UA 2 /hpf    Microscopic Comment SEE COMMENT    Echo   Result Value Ref Range    BSA 1.59 m2    LA WIDTH 3.20 cm    IVC diameter 1.31 cm    Left Ventricular Outflow Tract Mean Velocity 0.74 cm/s    Left Ventricular Outflow Tract Mean Gradient 2.35 mmHg    LVIDd 3.59 3.5 - 6.0 cm    IVS 1.08 0.6 - 1.1 cm    Posterior Wall 1.02 0.6 - 1.1 cm    LVIDs 2.49 2.1 - 4.0 cm    FS 31 28 - 44 %    STJ 2.97 cm    Ascending aorta 3.10 cm    LV mass 115.38 g    LA size 3.31 cm    RVDD 3.34 cm    TAPSE 2.10 cm    Left Ventricle Relative Wall Thickness 0.57 cm    AV regurgitation pressure 1/2 time [id number] ms    AV mean gradient 5 mmHg    AV valve area 1.85 cm2    AV Velocity Ratio 0.64     AV index (prosthetic) 0.75     MV valve area p 1/2 method 3.24 cm2    E/A ratio 0.99     E wave deceleration time 234.23 msec    IVRT 99.90 msec    LVOT diameter 1.77 cm    LVOT area 2.5 cm2    LVOT peak mehrdad 0.91 m/s    LVOT peak VTI 31.80 cm    Ao peak mehrdad 1.42 m/s    Ao VTI 42.3 cm    RVOT peak mehrdad 0.65 m/s    RVOT peak VTI 21.5 cm    LVOT stroke volume 78.21 cm3    AV peak gradient 8 mmHg    PV mean gradient 1.17 mmHg    MV Peak E Mehrdad 0.77 m/s    AR Max Mehrdad  4.54 m/s    TR Max Mehrdad 3.50 m/s    MV stenosis pressure 1/2 time 67.93 ms    MV Peak A Mehrdad 0.78 m/s    LV Systolic Volume 22.07 mL    LV Systolic Volume Index 13.9 mL/m2    LV Diastolic Volume 54.01 mL    LV Diastolic Volume Index 33.97 mL/m2    LV Mass Index 73 g/m2    RA Major Axis 3.98 cm    Left Atrium Major Axis 4.50 cm    Triscuspid Valve Regurgitation Peak Gradient 49 mmHg    RA Width 2.50 cm   POCT glucose   Result Value Ref Range    POCT Glucose 85 70 - 110 mg/dL        Imaging Results:  Imaging Results              X-Ray Chest AP Portable (Final result)  Result time 12/15/22 11:00:51      Final result by Fernando Royal MD (12/15/22 11:00:51)                   Impression:      No acute findings.      Electronically signed by: Fernando Royal MD  Date:    12/15/2022  Time:    11:00               Narrative:    EXAMINATION:  XR CHEST AP PORTABLE    CLINICAL HISTORY:  Chest Pain;    TECHNIQUE:  Single frontal view of the chest was performed.    COMPARISON:  11/14/2022    FINDINGS:  The cardiomediastinal silhouette is normal.  Aortic atherosclerosis.    The lungs are clear.  No pleural effusions.    No acute osseous findings.  Spinal stimulator in the thoracic canal.  Surgical clips left upper quadrant.                                          The EKG was ordered, reviewed, and independently interpreted by the ED provider.  Interpretation time: 11:07  Rate: 48 BPM  Rhythm: sinus bradycardia  Interpretation: No acute ST changes. No STEMI.      The Emergency Provider reviewed the vital signs and test results, which are outlined above.    ED Discussion   I discussed the case c cardiology (Dr. Granados) and they have concerns with multiple syncopal episodes on BB and rec observation to  and they will see the pt.    2:25 PM:  Dr Granados, Cardiology is  evaluating her and requesting orthostatic blood pressure    3:49 PM: Discussed case with Ruslan Arellnao NP (Hospital Medicine). Dr. Hendricks agrees with current care and  management of pt and rec observation to med/tele.   Admitting Service: Hospital Medicine  Admitting Physician: Dr. Hendricks  Admit to: Obs.        ED Medication(s):  Medications   insulin aspart U-100 pen 0-5 Units (has no administration in time range)   glucose chewable tablet 16 g (has no administration in time range)   glucose chewable tablet 24 g (has no administration in time range)   glucagon (human recombinant) injection 1 mg (has no administration in time range)   dextrose 10% bolus 125 mL 125 mL (has no administration in time range)   dextrose 10% bolus 250 mL 250 mL (has no administration in time range)   heparin (porcine) injection 5,000 Units (5,000 Units Subcutaneous Given 12/15/22 2125)   bisacodyL EC tablet 5 mg (has no administration in time range)   ondansetron injection 4 mg (has no administration in time range)   melatonin tablet 6 mg (6 mg Oral Given 12/15/22 2125)   aluminum-magnesium hydroxide-simethicone 200-200-20 mg/5 mL suspension 30 mL (has no administration in time range)   ibuprofen tablet 400 mg (400 mg Oral Given 12/15/22 1629)   hydrALAZINE injection 10 mg (has no administration in time range)   sodium chloride 0.9% bolus 250 mL 250 mL (0 mLs Intravenous Stopped 12/15/22 1307)   hydrALAZINE injection 10 mg (10 mg Intravenous Given 12/15/22 1432)   hydrALAZINE injection 20 mg ( Intravenous Canceled Entry 12/15/22 1528)             Medical Decision Making    Medical Decision Making:   Clinical Tests:   Lab Tests: Ordered and Reviewed  Radiological Study: Ordered and Reviewed  Medical Tests: Ordered and Reviewed         Scribe Attestation:   Scribe #1: I performed the above scribed service and the documentation accurately describes the services I performed. I attest to the accuracy of the note.    Attending:   Physician Attestation Statement for Scribe #1: I, Dr. Howard, personally performed the services described in this documentation, as scribed by Lisa Martínez, in my presence, and it is  both accurate and complete.          Clinical Impression       ICD-10-CM ICD-9-CM   1. Symptomatic bradycardia  R00.1 427.89   2. Syncope and collapse  R55 780.2       Disposition:   Disposition: Placed in Observation  Condition: Fair      James Howard MD  12/15/22 2131       James Howard MD  12/15/22 1903

## 2022-12-15 NOTE — H&P
[facility]  History & Physical    Patient Name: Leslie Wood  MRN: [id number]  Patient Class: OP- Observation  Admission Date: 12/15/2022  Attending Physician: Valarie Hendricks MD   Primary Care Provider: Angeles Carson MD         Patient information was obtained from patient, relative(s), past medical records and ER records.     Subjective:     Principal Problem:Syncope    Chief Complaint:   Chief Complaint   Patient presents with    Loss of Consciousness     Patient currently at [facility] for rehab, staff states she has had several syncopal episodes this week          HPI: The patient is 83 yo female with past medical history of arthritis, GERD, CAD, MI, hypertension, COVID, cardiomyopathy and stroke who presented to the ED with multiple syncopal episodes. The patient is at [place] for skilled care due to a significant fall in November. She has had 2 episodes  this week. This morning she was sitting at the table when she  suddenly lost consciousness. She denied chest pain, SOB, lightheadedness, or palpitations. She was noted to have heart rate in the 40s. She was orthostatic by blood pressure. Cardiology was consulted as her medications were recently adjusted. Patient feels she is at her baseline. Her syncopal episodes have all occurred at  rest while seated. Echo pending. Hospital medicine consulted for continuation of care. Patient placed in observation.      Past Medical History:   Diagnosis Date    Arthritis     Cataract     GERD (gastroesophageal reflux disease)     Heart attack 05/18/2022    Heart attack 05/23/2022    Hyperlipidemia     Hypertension     Stroke        Past Surgical History:   Procedure Laterality Date    ADENOIDECTOMY      ADRENAL GLAND SURGERY      APPENDECTOMY      BACK SURGERY      fusion l 4-5 s 1,2,3  fusion l 2-3    CORONARY ANGIOGRAPHY N/A 5/20/2022    Procedure: ANGIOGRAM, CORONARY ARTERY;  Surgeon: Domingo  MD Lakshmi;  Location: [facility] CATH LAB;  Service: Cardiology;  Laterality: N/A;    CORONARY ANGIOGRAPHY N/A 5/24/2022    Procedure: ANGIOGRAM, CORONARY ARTERY;  Surgeon: Domingo Martins MD;  Location: [facility] CATH LAB;  Service: Cardiology;  Laterality: N/A;    EYE SURGERY      HEMORRHOID SURGERY      HERNIA REPAIR      HYSTERECTOMY      indirect lumbar decompression      percutaneous placement of interspinous extension blocker    LEFT HEART CATHETERIZATION Left 5/20/2022    Procedure: CATHETERIZATION, HEART, LEFT;  Surgeon: Domingo Martins MD;  Location: [facility] CATH LAB;  Service: Cardiology;  Laterality: Left;    TONSILLECTOMY         Review of patient's allergies indicates:   Allergen Reactions    Acetaminophen     Amitriptyline     Hydrochlorothiazide      Causes muscle cramping    Lisinopril      hyperkalemia    Oxycodone     Percocet [oxycodone-acetaminophen] Other (See Comments)     Seizures    Belbuca [buprenorphine hcl] Nausea And Vomiting and Other (See Comments)     Black out     Codeine Nausea Only and Rash    Prazosin Other (See Comments)     dizziness       No current facility-administered medications on file prior to encounter.     Current Outpatient Medications on File Prior to Encounter   Medication Sig    ascorbic acid, vitamin C, (VITAMIN C) 500 MG tablet Take 1 tablet by mouth every morning.    atorvastatin (LIPITOR) 80 MG tablet Take 1 tablet by mouth every morning.    DULoxetine (CYMBALTA) 30 MG capsule Take 30 mg by mouth once daily.    furosemide (LASIX) 20 MG tablet Take 1 tablet (20 mg total) by mouth once daily.    metoprolol succinate (TOPROL-XL) 50 MG 24 hr tablet Take 1 tablet (50 mg total) by mouth once daily.    ramipriL (ALTACE) 10 MG capsule Take 10 mg by mouth once daily.    NUCYNTA 50 mg Tab Take 1 tablet (50 mg total) by mouth every 8 (eight) hours as needed (severe pain).    traMADoL (ULTRAM) 50 mg tablet Take 1 tablet (50 mg total) by mouth every 12  (twelve) hours as needed (moderate pain).    [DISCONTINUED] atorvastatin (LIPITOR) 80 MG tablet Take 1 tablet (80 mg total) by mouth every evening.    [DISCONTINUED] carvediloL (COREG) 6.25 MG tablet Take 1 tablet (6.25 mg total) by mouth 2 (two) times daily. TAKE (1) TABLET BY MOUTH 2 TIMES A DAY WITH MEALS    [DISCONTINUED] cloNIDine (CATAPRES) 0.1 MG tablet Take 1 tablet (0.1 mg total) by mouth 2 (two) times daily. To take an extra dose if BP >160/90    [DISCONTINUED] diclofenac sodium (VOLTAREN) 1 % Gel Apply 2 g topically 3 (three) times daily.    [DISCONTINUED] isosorbide mononitrate (IMDUR) 30 MG 24 hr tablet TAKE 1 TABLET BY MOUTH TWICE A DAY    [DISCONTINUED] metoprolol tartrate (LOPRESSOR) 25 MG tablet Take 1 tablet (25 mg total) by mouth 3 (three) times daily.    [DISCONTINUED] nitroGLYCERIN (NITROSTAT) 0.4 MG SL tablet Place 1 tablet (0.4 mg total) under the tongue every 5 (five) minutes as needed for Chest pain.    [DISCONTINUED] tiZANidine (ZANAFLEX) 4 MG tablet Take 4 mg by mouth once daily.    [DISCONTINUED] valsartan (DIOVAN) 160 MG tablet TAKE 1 TABLET BY MOUTH TWICE A DAY     Family History       Problem Relation (Age of Onset)    Breast cancer Sister    Diabetes Mother    Heart disease Mother    Hypertension Mother, Father    Kidney disease Father          Tobacco Use    Smoking status: Never    Smokeless tobacco: Never   Substance and Sexual Activity    Alcohol use: No    Drug use: No    Sexual activity: Not Currently     Review of Systems   Constitutional:  Negative for chills and fever.   Respiratory:  Negative for shortness of breath.    Cardiovascular:  Negative for chest pain and palpitations.   Neurological:  Positive for syncope.   All other systems reviewed and are negative.  Objective:     Vital Signs (Most Recent):  Temp: 98.3 °F (36.8 °C) (12/15/22 1454)  Pulse: 61 (12/15/22 1454)  Resp: 20 (12/15/22 1454)  BP: (!) 218/79 (12/15/22 1454)  SpO2: 98 % (12/15/22 1454)    Vital Signs (24h Range):  Temp:  [98.3 °F (36.8 °C)-98.9 °F (37.2 °C)] 98.3 °F (36.8 °C)  Pulse:  [47-84] 61  Resp:  [16-20] 20  SpO2:  [96 %-98 %] 98 %  BP: (145-218)/(70-90) 218/79     Weight: 59 kg (130 lb)  Body mass index is 24.56 kg/m².    Physical Exam  HENT:      Head: Normocephalic and atraumatic.   Cardiovascular:      Rate and Rhythm: Regular rhythm. Bradycardia present.      Heart sounds: No murmur heard.  Pulmonary:      Effort: Pulmonary effort is normal. No respiratory distress.      Breath sounds: Normal breath sounds. No wheezing.   Abdominal:      General: Bowel sounds are normal. There is no distension.      Palpations: Abdomen is soft.      Tenderness: There is no abdominal tenderness.   Musculoskeletal:         General: No swelling.   Skin:     General: Skin is warm and dry.   Neurological:      Mental Status: She is alert and oriented to person, place, and time. Mental status is at baseline.           Significant Labs: All pertinent labs within the past 24 hours have been reviewed.  CBC:   Recent Labs   Lab 12/15/22  1128   WBC 8.08   HGB 13.4   HCT 41.6        CMP:   Recent Labs   Lab 12/15/22  1128      K 4.0      CO2 29      BUN 18   CREATININE 0.7   CALCIUM 9.7   PROT 6.9   ALBUMIN 3.5   BILITOT 1.0   ALKPHOS 80   AST 21   ALT 15   ANIONGAP 11       Significant Imaging: I have reviewed all pertinent imaging results/findings within the past 24 hours.    Assessment/Plan:     * Syncope  Likely multifactorial  Hold beta blocker  Continue IVFs  Repeat orthostatic vitals in am  F/u echo  Await further      Bradycardia  Continue telemetry monitoring  Hold beta blocker  Further plan in am based upon heart rate response to holding beta blocker      Stress-induced cardiomyopathy  Per cardiology  EF 30-40% last admission  Repeat echo pending    Type 2 diabetes mellitus without complication, without long-term current use of insulin  Patient's FSGs are controlled with  diet.  Last A1c reviewed-   Lab Results   Component Value Date    HGBA1C 5.2 05/20/2022     Most recent fingerstick glucose reviewed-100   Current correctional scale  Low  Maintain anti-hyperglycemic dose as follows-   Antihyperglycemics (From admission, onward)    Start     Stop Route Frequency Ordered    12/15/22 1632  insulin aspart U-100 pen 0-5 Units         -- SubQ Before meals & nightly PRN 12/15/22 1535        Hold Oral hypoglycemics while patient is in the hospital.    Essential hypertension  Hold beta blocker due to bradycardia  As history of labile blood pressure will use Iv hydralazine with parameters   Possible doxazosin based upon BP per cardiology  Monitor blood pressure      VTE Risk Mitigation (From admission, onward)         Ordered     heparin (porcine) injection 5,000 Units  Every 8 hours         12/15/22 1549     Place sequential compression device  Until discontinued         12/15/22 1546                   Valarie Hendricks MD  Department of Hospital Medicine   [facility] - Telemetry ([facility])

## 2022-12-15 NOTE — ASSESSMENT & PLAN NOTE
-BP labile  -May be contributing to symptoms  -Monitor trend  -BB held due to bradycardia  -Orthostatic vitals pending  -Can continue ACEi as tolerated  -Would like benefit from amlodipine vs Doxazosin pending BP trend

## 2022-12-15 NOTE — TELEPHONE ENCOUNTER
Called patient regarding message below. No answer and no option to leave VM. Called patient 3x.      ----- Message from Daniela Clark sent at 12/14/2022 12:49 PM CST -----  Pt is requesting a call back in regards to her knee replacement that she need. Pt can be reached at [phone]

## 2022-12-15 NOTE — ASSESSMENT & PLAN NOTE
-Presents s/p syncopal episode that occurred at rest while eating breakfast  -Suspect arrhythmia mediated, HR in 40's upon arrival  -EKG without evidence of high grade AV block  -Troponin negative  -Hold BB for now  -Monitor overnight  -Check orthostatics as labile BP may be contributing to presentation

## 2022-12-15 NOTE — HPI
Ms. Wood is an 82 year old female patient whose current medical conditions include pre-DM, HTN, prior CVA, cardiomyopathy (felt to be stress-induced), and NSTEMI/chest pain s/p C which showed severe mid myocardial bridging who presented to [facility] ED today from her SNF facility due to syncope. Per patient's daughter, patient was sitting at the breakfast table when she suddenly lost consciousness. Unknown duration. Patient denied any associated chest pain, SOB, lightheadedness, dizziness, or palpitations prior to the event. Initial workup in ED revealed bradycardia (HR in 40's) and labile BP and patient was subsequently admitted for further evaluation and treatment. Cardiology consulted to assist with management. Patient seen and examined today in ED. Feels back to her baseline. No complaints. Daughter reports she suffered a severe fall in November and has been in SNF facility since that time. She reports has had several syncopal episodes, all occurring at rest/when patient is seated. Chart reviewed. Troponin x 1 negative. Repeat echo pending. EKG without evidence of heart block. Orthostatic vitals pending.

## 2022-12-15 NOTE — PHARMACY MED REC
"Admission Medication History     The home medication history was taken by Rk Molina.    You may go to "Admission" then "Reconcile Home Medications" tabs to review and/or act upon these items.     The home medication list has been updated by the Pharmacy department.   Please read ALL comments highlighted in yellow.   Please address this information as you see fit.    Feel free to contact us if you have any questions or require assistance.      The medications listed below were removed from the home medication list. Please reorder if appropriate:  Patient reports no longer taking the following medication(s):  CARVEDILOL 6.25 MG TABLET  CLONIDINE 0.1 MG TABLET  DICLOFENAC SODIUM 1 % TOPICAL GEL  ISOSORBIDE MONONITRATE 30 MG TABLET  METOPROLOL TARTRATE 25 MG TABLET  NITROGLYCERIN 0.4 MG SUBLINGUAL TABLET  TIZANIDINE 4 MG TABLET  VALSARTAN 160 MG TABLET    Medications listed below were obtained from: Patient/family, Analytic software- [facility], Patient's pharmacy, and Nursing home  (Not in a hospital admission)      Potential issues to be addressed PRIOR TO DISCHARGE: NONE    Rk Molina, Debra-Adv  Pharmacy Technician Specialist-Medication History  [facility] [phone]  Secure chat preferred     Current Outpatient Medications on File Prior to Encounter   Medication Sig Dispense Refill Last Dose    ascorbic acid, vitamin C, (VITAMIN C) 500 MG tablet Take 1 tablet by mouth every morning.   12/15/2022    atorvastatin (LIPITOR) 80 MG tablet Take 1 tablet by mouth every morning.   12/15/2022    DULoxetine (CYMBALTA) 30 MG capsule Take 30 mg by mouth once daily.   12/15/2022    furosemide (LASIX) 20 MG tablet Take 1 tablet (20 mg total) by mouth once daily. 30 tablet 11 12/15/2022    metoprolol succinate (TOPROL-XL) 50 MG 24 hr tablet Take 1 tablet (50 mg total) by mouth once daily. 30 tablet 11 12/15/2022    ramipriL (ALTACE) 10 MG capsule Take 10 mg by mouth once daily.   12/15/2022    NUCYNTA 50 mg Tab Take 1 " tablet (50 mg total) by mouth every 8 (eight) hours as needed (severe pain). 10 tablet 0 Unknown at PRN only    traMADoL (ULTRAM) 50 mg tablet Take 1 tablet (50 mg total) by mouth every 12 (twelve) hours as needed (moderate pain). 10 tablet 0 Unknown at PRN only    [DISCONTINUED] carvediloL (COREG) 6.25 MG tablet Take 1 tablet (6.25 mg total) by mouth 2 (two) times daily. TAKE (1) TABLET BY MOUTH 2 TIMES A DAY WITH MEALS 180 tablet 3     [DISCONTINUED] cloNIDine (CATAPRES) 0.1 MG tablet Take 1 tablet (0.1 mg total) by mouth 2 (two) times daily. To take an extra dose if BP >160/90 90 tablet 11     [DISCONTINUED] diclofenac sodium (VOLTAREN) 1 % Gel Apply 2 g topically 3 (three) times daily. 1 Tube 2     [DISCONTINUED] isosorbide mononitrate (IMDUR) 30 MG 24 hr tablet TAKE 1 TABLET BY MOUTH TWICE A DAY 60 tablet 3     [DISCONTINUED] metoprolol tartrate (LOPRESSOR) 25 MG tablet Take 1 tablet (25 mg total) by mouth 3 (three) times daily. 90 tablet 0     [DISCONTINUED] nitroGLYCERIN (NITROSTAT) 0.4 MG SL tablet Place 1 tablet (0.4 mg total) under the tongue every 5 (five) minutes as needed for Chest pain. 7 tablet 0     [DISCONTINUED] tiZANidine (ZANAFLEX) 4 MG tablet Take 4 mg by mouth once daily.       [DISCONTINUED] valsartan (DIOVAN) 160 MG tablet TAKE 1 TABLET BY MOUTH TWICE A  tablet 0                            .

## 2022-12-15 NOTE — HPI
The patient is 81 yo female with past medical history of arthritis, GERD, CAD, MI, hypertension, COVID, cardiomyopathy and stroke who presented to the ED with multiple syncopal episodes. The patient is at [place] for skilled care due to a significant fall in November. She has had 2 episodes  this week. This morning she was sitting at the table when she  suddenly lost consciousness. She denied chest pain, SOB, lightheadedness, or palpitations. She was noted to have heart rate in the 40s. She was orthostatic by blood pressure. Cardiology was consulted as her medications were recently adjusted. Patient feels she is at her baseline. Her syncopal episodes have all occurred at  rest while seated. Echo pending. Hospital medicine consulted for continuation of care. Patient placed in observation.

## 2022-12-15 NOTE — ASSESSMENT & PLAN NOTE
Hold beta blocker due to bradycardia  As history of labile blood pressure will use Iv hydralazine with parameters   Possible doxazosin based upon BP per cardiology  Monitor blood pressure

## 2022-12-15 NOTE — ASSESSMENT & PLAN NOTE
Continue telemetry monitoring  Hold beta blocker  Further plan in am based upon heart rate response to holding beta blocker

## 2022-12-15 NOTE — ED NOTES
Pt resting in bed. NAD, VSS except BP; hypertensive. , RR equal and unlabored. Will continue to monitor

## 2022-12-15 NOTE — ASSESSMENT & PLAN NOTE
Patient's FSGs are controlled with diet.  Last A1c reviewed-   Lab Results   Component Value Date    HGBA1C 5.2 05/20/2022     Most recent fingerstick glucose reviewed-100   Current correctional scale  Low  Maintain anti-hyperglycemic dose as follows-   Antihyperglycemics (From admission, onward)    Start     Stop Route Frequency Ordered    12/15/22 1632  insulin aspart U-100 pen 0-5 Units         -- SubQ Before meals & nightly PRN 12/15/22 1535        Hold Oral hypoglycemics while patient is in the hospital.

## 2022-12-15 NOTE — ASSESSMENT & PLAN NOTE
-EF previously 30-40% during prior admission  -Repeat echo pending  -BB held due to bradycardia  -Continue ACEi as tolerated

## 2022-12-15 NOTE — ASSESSMENT & PLAN NOTE
Likely multifactorial  Hold beta blocker  Continue IVFs  Repeat orthostatic vitals in am  F/u echo  Await further

## 2022-12-15 NOTE — CONSULTS
[facility] - Emergency Dept.  Cardiology  Consult Note    Patient Name: Leslie Wood  MRN: [id number]  Admission Date: 12/15/2022  Hospital Length of Stay: 0 days  Code Status: Prior   Attending Provider: Valarie Hendricks MD   Consulting Provider: Meka Franklin PA-C  Primary Care Physician: Angeles Carson MD  Principal Problem:<principal problem not specified>    Patient information was obtained from patient, past medical records and ER records.     Inpatient consult to Cardiology  Consult performed by: Meka Franklin PA-C  Consult ordered by: James Howard MD        Subjective:     Chief Complaint:  Syncope    HPI:   Ms. Wood is an 82 year old female patient whose current medical conditions include pre-DM, HTN, prior CVA, cardiomyopathy (felt to be stress-induced), and NSTEMI/chest pain s/p LHC which showed severe mid myocardial bridging who presented to [facility] ED today from her SNF facility due to syncope. Per patient's daughter, patient was sitting at the breakfast table when she suddenly lost consciousness. Unknown duration. Patient denied any associated chest pain, SOB, lightheadedness, dizziness, or palpitations prior to the event. Initial workup in ED revealed bradycardia (HR in 40's) and labile BP and patient was subsequently admitted for further evaluation and treatment. Cardiology consulted to assist with management. Patient seen and examined today in ED. Feels back to her baseline. No complaints. Daughter reports she suffered a severe fall in November and has been in SNF facility since that time. She reports has had several syncopal episodes, all occurring at rest/when patient is seated. Chart reviewed. Troponin x 1 negative. Repeat echo pending. EKG without evidence of heart block. Orthostatic vitals pending.                   Past Medical History:   Diagnosis Date    Arthritis     Cataract     GERD (gastroesophageal reflux disease)     Heart attack 05/18/2022    Heart attack 05/23/2022     Hyperlipidemia     Hypertension     Stroke        Past Surgical History:   Procedure Laterality Date    ADENOIDECTOMY      ADRENAL GLAND SURGERY      APPENDECTOMY      BACK SURGERY      fusion l 4-5 s 1,2,3  fusion l 2-3    CORONARY ANGIOGRAPHY N/A 5/20/2022    Procedure: ANGIOGRAM, CORONARY ARTERY;  Surgeon: Domingo Martins MD;  Location: [facility] CATH LAB;  Service: Cardiology;  Laterality: N/A;    CORONARY ANGIOGRAPHY N/A 5/24/2022    Procedure: ANGIOGRAM, CORONARY ARTERY;  Surgeon: Domingo Martins MD;  Location: [facility] CATH LAB;  Service: Cardiology;  Laterality: N/A;    EYE SURGERY      HEMORRHOID SURGERY      HERNIA REPAIR      HYSTERECTOMY      indirect lumbar decompression      percutaneous placement of interspinous extension blocker    LEFT HEART CATHETERIZATION Left 5/20/2022    Procedure: CATHETERIZATION, HEART, LEFT;  Surgeon: Domingo Martins MD;  Location: [facility] CATH LAB;  Service: Cardiology;  Laterality: Left;    TONSILLECTOMY         Review of patient's allergies indicates:   Allergen Reactions    Acetaminophen     Amitriptyline     Hydrochlorothiazide      Causes muscle cramping    Lisinopril      hyperkalemia    Oxycodone     Percocet [oxycodone-acetaminophen] Other (See Comments)     Seizures    Belbuca [buprenorphine hcl] Nausea And Vomiting and Other (See Comments)     Black out     Codeine Nausea Only and Rash    Prazosin Other (See Comments)     dizziness       No current facility-administered medications on file prior to encounter.     Current Outpatient Medications on File Prior to Encounter   Medication Sig    atorvastatin (LIPITOR) 80 MG tablet Take 1 tablet (80 mg total) by mouth every evening.    DULoxetine (CYMBALTA) 30 MG capsule Take 30 mg by mouth once daily.    furosemide (LASIX) 20 MG tablet Take 1 tablet (20 mg total) by mouth once daily.    metoprolol succinate (TOPROL-XL) 50 MG 24 hr tablet Take 1 tablet (50 mg total) by mouth once daily.    NUCYNTA  50 mg Tab Take 1 tablet (50 mg total) by mouth every 8 (eight) hours as needed (severe pain).    ramipriL (ALTACE) 10 MG capsule Take 10 mg by mouth once daily.    tiZANidine (ZANAFLEX) 4 MG tablet Take 4 mg by mouth once daily.    traMADoL (ULTRAM) 50 mg tablet Take 1 tablet (50 mg total) by mouth every 12 (twelve) hours as needed (moderate pain).    [DISCONTINUED] carvediloL (COREG) 6.25 MG tablet Take 1 tablet (6.25 mg total) by mouth 2 (two) times daily. TAKE (1) TABLET BY MOUTH 2 TIMES A DAY WITH MEALS    [DISCONTINUED] cloNIDine (CATAPRES) 0.1 MG tablet Take 1 tablet (0.1 mg total) by mouth 2 (two) times daily. To take an extra dose if BP >160/90    [DISCONTINUED] diclofenac sodium (VOLTAREN) 1 % Gel Apply 2 g topically 3 (three) times daily.    [DISCONTINUED] isosorbide mononitrate (IMDUR) 30 MG 24 hr tablet TAKE 1 TABLET BY MOUTH TWICE A DAY    [DISCONTINUED] metoprolol tartrate (LOPRESSOR) 25 MG tablet Take 1 tablet (25 mg total) by mouth 3 (three) times daily.    [DISCONTINUED] nitroGLYCERIN (NITROSTAT) 0.4 MG SL tablet Place 1 tablet (0.4 mg total) under the tongue every 5 (five) minutes as needed for Chest pain.    [DISCONTINUED] valsartan (DIOVAN) 160 MG tablet TAKE 1 TABLET BY MOUTH TWICE A DAY     Family History       Problem Relation (Age of Onset)    Breast cancer Sister    Diabetes Mother    Heart disease Mother    Hypertension Mother, Father    Kidney disease Father          Tobacco Use    Smoking status: Never    Smokeless tobacco: Never   Substance and Sexual Activity    Alcohol use: No    Drug use: No    Sexual activity: Not Currently     Review of Systems   Constitutional: Positive for malaise/fatigue.   HENT: Negative.     Eyes: Negative.    Cardiovascular:  Positive for syncope.   Respiratory: Negative.     Endocrine: Negative.    Hematologic/Lymphatic: Negative.    Skin: Negative.    Musculoskeletal:  Positive for arthritis and joint pain.   Gastrointestinal: Negative.     Genitourinary: Negative.    Psychiatric/Behavioral: Negative.     Allergic/Immunologic: Negative.    Objective:     Vital Signs (Most Recent):  Temp: 98.9 °F (37.2 °C) (12/15/22 1034)  Pulse: (!) 49 (12/15/22 1405)  Resp: 20 (12/15/22 1405)  BP: (!) 215/88 (12/15/22 1432)  SpO2: 96 % (12/15/22 1405)   Vital Signs (24h Range):  Temp:  [98.9 °F (37.2 °C)] 98.9 °F (37.2 °C)  Pulse:  [47-84] 49  Resp:  [16-20] 20  SpO2:  [96 %-98 %] 96 %  BP: (145-215)/(70-90) 215/88     Weight: 59 kg (130 lb)  Body mass index is 24.56 kg/m².    SpO2: 96 %         Intake/Output Summary (Last 24 hours) at 12/15/2022 1433  Last data filed at 12/15/2022 1307  Gross per 24 hour   Intake 250 ml   Output --   Net 250 ml       Lines/Drains/Airways       Peripheral Intravenous Line  Duration                  Peripheral IV - Single Lumen Anterior;Proximal;Right Forearm -- days                    Physical Exam  Vitals and nursing note reviewed.   Constitutional:       General: She is not in acute distress.     Appearance: Normal appearance. She is well-developed. She is not diaphoretic.   HENT:      Head: Normocephalic and atraumatic.   Eyes:      General:         Right eye: No discharge.         Left eye: No discharge.      Pupils: Pupils are equal, round, and reactive to light.   Neck:      Thyroid: No thyromegaly.      Vascular: No JVD.      Trachea: No tracheal deviation.   Cardiovascular:      Rate and Rhythm: Regular rhythm. Bradycardia present.      Heart sounds: Normal heart sounds, S1 normal and S2 normal. No murmur heard.     Comments: HR in 40's    Pulmonary:      Effort: Pulmonary effort is normal. No respiratory distress.      Breath sounds: Normal breath sounds. No wheezing or rales.   Abdominal:      General: There is no distension.      Palpations: Abdomen is soft.      Tenderness: There is no rebound.   Musculoskeletal:      Cervical back: Neck supple.      Right lower leg: No edema.      Left lower leg: No edema.   Skin:      General: Skin is warm and dry.      Findings: No erythema.   Neurological:      General: No focal deficit present.      Mental Status: She is alert and oriented to person, place, and time.   Psychiatric:         Mood and Affect: Mood normal.         Behavior: Behavior normal.         Thought Content: Thought content normal.       Significant Labs: CMP   Recent Labs   Lab 12/15/22  1128      K 4.0      CO2 29      BUN 18   CREATININE 0.7   CALCIUM 9.7   PROT 6.9   ALBUMIN 3.5   BILITOT 1.0   ALKPHOS 80   AST 21   ALT 15   ANIONGAP 11   , CBC   Recent Labs   Lab 12/15/22  1128   WBC 8.08   HGB 13.4   HCT 41.6      , Troponin   Recent Labs   Lab 12/15/22  1128   TROPONINI 0.021   , and All pertinent lab results from the last 24 hours have been reviewed.    Significant Imaging: Echocardiogram: Transthoracic echo (TTE) complete (Cupid Only):   Results for orders placed or performed during the hospital encounter of 05/19/22   Echo   Result Value Ref Range    BSA 1.61 m2    TDI SEPTAL 0.03 m/s    LV LATERAL E/E' RATIO 12.50 m/s    LV SEPTAL E/E' RATIO 16.67 m/s    IVC diameter 2.27 cm    Left Ventricular Outflow Tract Mean Velocity 0.71597178410 cm/s    Left Ventricular Outflow Tract Mean Gradient 1.54 mmHg    TDI LATERAL 0.04 m/s    LVIDd 3.61 3.5 - 6.0 cm    IVS 1.30 (A) 0.6 - 1.1 cm    Posterior Wall 1.30 (A) 0.6 - 1.1 cm    Ao root annulus 2.68 cm    LVIDs 2.60 2.1 - 4.0 cm    FS 28 28 - 44 %    Sinus 2.55 cm    STJ 2.63 cm    Ascending aorta 2.39 cm    LV mass 160.71 g    LA size 3.92 cm    TAPSE 1.43 cm    Left Ventricle Relative Wall Thickness 0.72 cm    AV regurgitation pressure 1/2 time 492.786463964216865 ms    AV mean gradient 5 mmHg    AV valve area 1.57 cm2    AV Velocity Ratio 0.62     AV index (prosthetic) 0.64     MV mean gradient 1 mmHg    MV valve area by continuity eq 2.33 cm2    E/A ratio 0.63     Mean e' 0.04 m/s    E wave deceleration time 291.226453403183218 msec    IVRT  125.395093304333378 msec    LVOT diameter 1.77 cm    LVOT area 2.5 cm2    LVOT peak mehrdad 0.93 m/s    LVOT peak VTI 24.40 cm    Ao peak mehrdad 1.50 m/s    Ao VTI 38.3 cm    RVOT peak mehrdad 0.74 m/s    RVOT peak VTI 19.0 cm    Mr max mehrdad 4.26 m/s    LVOT stroke volume 60.01 cm3    AV peak gradient 9 mmHg    MV peak gradient 2 mmHg    PV mean gradient 1.30 mmHg    E/E' ratio 14.29 m/s    MV Peak E Mehrdad 0.50 m/s    AR Max Mehrdad 3.66 m/s    TR Max Emhrdad 3.18 m/s    MV VTI 25.8 cm    MV Peak A Mehrdad 0.79 m/s    LV Systolic Volume 24.52 mL    LV Systolic Volume Index 15.4 mL/m2    LV Diastolic Volume 54.68 mL    LV Diastolic Volume Index 34.39 mL/m2    LV Mass Index 101 g/m2    RA Major Axis 4.03 cm    Left Atrium Minor Axis 5.00 cm    Left Atrium Major Axis 4.99 cm    Triscuspid Valve Regurgitation Peak Gradient 40 mmHg    RA Width 2.72 cm    Right Atrial Pressure (from IVC) 15 mmHg    EF 30 %    TV rest pulmonary artery pressure 55 mmHg    Narrative    · The left ventricle is normal in size with concentric hypertrophy and   moderately decreased systolic function.  · Grade I left ventricular diastolic dysfunction.  · The estimated PA systolic pressure is 55 mmHg.  · Normal right ventricular size with normal right ventricular systolic   function.  · There is pulmonary hypertension.  · Elevated central venous pressure (15 mmHg).  · The estimated ejection fraction is 30%.  · There are segmental left ventricular wall motion abnormalities.  · Mild aortic regurgitation.  · Mild mitral regurgitation.  · Mild tricuspid regurgitation.      , EKG: Reviewed, and X-Ray: CXR: X-Ray Chest 1 View (CXR): No results found for this visit on 12/15/22. and X-Ray Chest PA and Lateral (CXR): No results found for this visit on 12/15/22.    Assessment and Plan:   Patient who presents s/p syncopal episode. Suspicious for arrhythmia related event, bradycardiac upon arrival. Hold BB and assess response. Labile BP may be contributing. Monitor trend. Check  echo.      Syncope  -Presents s/p syncopal episode that occurred at rest while eating breakfast  -Suspect arrhythmia mediated, HR in 40's upon arrival  -EKG without evidence of high grade AV block  -Troponin negative  -Hold BB for now  -Monitor overnight  -Check orthostatics as labile BP may be contributing to presentation    Stress-induced cardiomyopathy  -EF previously 30-40% during prior admission  -Repeat echo pending  -BB held due to bradycardia  -Continue ACEi as tolerated    Type 2 diabetes mellitus without complication, without long-term current use of insulin  -Mgmt as per hospital medicine    Essential hypertension  -BP labile  -May be contributing to symptoms  -Monitor trend  -BB held due to bradycardia  -Orthostatic vitals pending  -Can continue ACEi as tolerated  -Would like benefit from amlodipine vs Doxazosin pending BP trend          VTE Risk Mitigation (From admission, onward)    None          Thank you for your consult. I will follow-up with patient. Please contact us if you have any additional questions.    Meka Franklin PA-C  Cardiology   [facility] - Emergency Dept.

## 2022-12-15 NOTE — SUBJECTIVE & OBJECTIVE
Past Medical History:   Diagnosis Date    Arthritis     Cataract     GERD (gastroesophageal reflux disease)     Heart attack 05/18/2022    Heart attack 05/23/2022    Hyperlipidemia     Hypertension     Stroke        Past Surgical History:   Procedure Laterality Date    ADENOIDECTOMY      ADRENAL GLAND SURGERY      APPENDECTOMY      BACK SURGERY      fusion l 4-5 s 1,2,3  fusion l 2-3    CORONARY ANGIOGRAPHY N/A 5/20/2022    Procedure: ANGIOGRAM, CORONARY ARTERY;  Surgeon: Domingo Martins MD;  Location: [facility] CATH LAB;  Service: Cardiology;  Laterality: N/A;    CORONARY ANGIOGRAPHY N/A 5/24/2022    Procedure: ANGIOGRAM, CORONARY ARTERY;  Surgeon: Domingo Martins MD;  Location: [facility] CATH LAB;  Service: Cardiology;  Laterality: N/A;    EYE SURGERY      HEMORRHOID SURGERY      HERNIA REPAIR      HYSTERECTOMY      indirect lumbar decompression      percutaneous placement of interspinous extension blocker    LEFT HEART CATHETERIZATION Left 5/20/2022    Procedure: CATHETERIZATION, HEART, LEFT;  Surgeon: Domingo Martins MD;  Location: [facility] CATH LAB;  Service: Cardiology;  Laterality: Left;    TONSILLECTOMY         Review of patient's allergies indicates:   Allergen Reactions    Acetaminophen     Amitriptyline     Hydrochlorothiazide      Causes muscle cramping    Lisinopril      hyperkalemia    Oxycodone     Percocet [oxycodone-acetaminophen] Other (See Comments)     Seizures    Belbuca [buprenorphine hcl] Nausea And Vomiting and Other (See Comments)     Black out     Codeine Nausea Only and Rash    Prazosin Other (See Comments)     dizziness       No current facility-administered medications on file prior to encounter.     Current Outpatient Medications on File Prior to Encounter   Medication Sig    atorvastatin (LIPITOR) 80 MG tablet Take 1 tablet (80 mg total) by mouth every evening.    DULoxetine (CYMBALTA) 30 MG capsule Take 30 mg by mouth once daily.    furosemide (LASIX) 20 MG tablet Take 1 tablet (20 mg total)  by mouth once daily.    metoprolol succinate (TOPROL-XL) 50 MG 24 hr tablet Take 1 tablet (50 mg total) by mouth once daily.    NUCYNTA 50 mg Tab Take 1 tablet (50 mg total) by mouth every 8 (eight) hours as needed (severe pain).    ramipriL (ALTACE) 10 MG capsule Take 10 mg by mouth once daily.    tiZANidine (ZANAFLEX) 4 MG tablet Take 4 mg by mouth once daily.    traMADoL (ULTRAM) 50 mg tablet Take 1 tablet (50 mg total) by mouth every 12 (twelve) hours as needed (moderate pain).    [DISCONTINUED] carvediloL (COREG) 6.25 MG tablet Take 1 tablet (6.25 mg total) by mouth 2 (two) times daily. TAKE (1) TABLET BY MOUTH 2 TIMES A DAY WITH MEALS    [DISCONTINUED] cloNIDine (CATAPRES) 0.1 MG tablet Take 1 tablet (0.1 mg total) by mouth 2 (two) times daily. To take an extra dose if BP >160/90    [DISCONTINUED] diclofenac sodium (VOLTAREN) 1 % Gel Apply 2 g topically 3 (three) times daily.    [DISCONTINUED] isosorbide mononitrate (IMDUR) 30 MG 24 hr tablet TAKE 1 TABLET BY MOUTH TWICE A DAY    [DISCONTINUED] metoprolol tartrate (LOPRESSOR) 25 MG tablet Take 1 tablet (25 mg total) by mouth 3 (three) times daily.    [DISCONTINUED] nitroGLYCERIN (NITROSTAT) 0.4 MG SL tablet Place 1 tablet (0.4 mg total) under the tongue every 5 (five) minutes as needed for Chest pain.    [DISCONTINUED] valsartan (DIOVAN) 160 MG tablet TAKE 1 TABLET BY MOUTH TWICE A DAY     Family History       Problem Relation (Age of Onset)    Breast cancer Sister    Diabetes Mother    Heart disease Mother    Hypertension Mother, Father    Kidney disease Father          Tobacco Use    Smoking status: Never    Smokeless tobacco: Never   Substance and Sexual Activity    Alcohol use: No    Drug use: No    Sexual activity: Not Currently     Review of Systems   Constitutional: Positive for malaise/fatigue.   HENT: Negative.     Eyes: Negative.    Cardiovascular:  Positive for syncope.   Respiratory: Negative.     Endocrine: Negative.    Hematologic/Lymphatic:  Negative.    Skin: Negative.    Musculoskeletal:  Positive for arthritis and joint pain.   Gastrointestinal: Negative.    Genitourinary: Negative.    Psychiatric/Behavioral: Negative.     Allergic/Immunologic: Negative.    Objective:     Vital Signs (Most Recent):  Temp: 98.9 °F (37.2 °C) (12/15/22 1034)  Pulse: (!) 49 (12/15/22 1405)  Resp: 20 (12/15/22 1405)  BP: (!) 215/88 (12/15/22 1432)  SpO2: 96 % (12/15/22 1405)   Vital Signs (24h Range):  Temp:  [98.9 °F (37.2 °C)] 98.9 °F (37.2 °C)  Pulse:  [47-84] 49  Resp:  [16-20] 20  SpO2:  [96 %-98 %] 96 %  BP: (145-215)/(70-90) 215/88     Weight: 59 kg (130 lb)  Body mass index is 24.56 kg/m².    SpO2: 96 %         Intake/Output Summary (Last 24 hours) at 12/15/2022 1433  Last data filed at 12/15/2022 1307  Gross per 24 hour   Intake 250 ml   Output --   Net 250 ml       Lines/Drains/Airways       Peripheral Intravenous Line  Duration                  Peripheral IV - Single Lumen Anterior;Proximal;Right Forearm -- days                    Physical Exam  Vitals and nursing note reviewed.   Constitutional:       General: She is not in acute distress.     Appearance: Normal appearance. She is well-developed. She is not diaphoretic.   HENT:      Head: Normocephalic and atraumatic.   Eyes:      General:         Right eye: No discharge.         Left eye: No discharge.      Pupils: Pupils are equal, round, and reactive to light.   Neck:      Thyroid: No thyromegaly.      Vascular: No JVD.      Trachea: No tracheal deviation.   Cardiovascular:      Rate and Rhythm: Regular rhythm. Bradycardia present.      Heart sounds: Normal heart sounds, S1 normal and S2 normal. No murmur heard.     Comments: HR in 40's    Pulmonary:      Effort: Pulmonary effort is normal. No respiratory distress.      Breath sounds: Normal breath sounds. No wheezing or rales.   Abdominal:      General: There is no distension.      Palpations: Abdomen is soft.      Tenderness: There is no rebound.    Musculoskeletal:      Cervical back: Neck supple.      Right lower leg: No edema.      Left lower leg: No edema.   Skin:     General: Skin is warm and dry.      Findings: No erythema.   Neurological:      General: No focal deficit present.      Mental Status: She is alert and oriented to person, place, and time.   Psychiatric:         Mood and Affect: Mood normal.         Behavior: Behavior normal.         Thought Content: Thought content normal.       Significant Labs: CMP   Recent Labs   Lab 12/15/22  1128      K 4.0      CO2 29      BUN 18   CREATININE 0.7   CALCIUM 9.7   PROT 6.9   ALBUMIN 3.5   BILITOT 1.0   ALKPHOS 80   AST 21   ALT 15   ANIONGAP 11   , CBC   Recent Labs   Lab 12/15/22  1128   WBC 8.08   HGB 13.4   HCT 41.6      , Troponin   Recent Labs   Lab 12/15/22  1128   TROPONINI 0.021   , and All pertinent lab results from the last 24 hours have been reviewed.    Significant Imaging: Echocardiogram: Transthoracic echo (TTE) complete (Cupid Only):   Results for orders placed or performed during the hospital encounter of 05/19/22   Echo   Result Value Ref Range    BSA 1.61 m2    TDI SEPTAL 0.03 m/s    LV LATERAL E/E' RATIO 12.50 m/s    LV SEPTAL E/E' RATIO 16.67 m/s    IVC diameter 2.27 cm    Left Ventricular Outflow Tract Mean Velocity 0.86312122274 cm/s    Left Ventricular Outflow Tract Mean Gradient 1.54 mmHg    TDI LATERAL 0.04 m/s    LVIDd 3.61 3.5 - 6.0 cm    IVS 1.30 (A) 0.6 - 1.1 cm    Posterior Wall 1.30 (A) 0.6 - 1.1 cm    Ao root annulus 2.68 cm    LVIDs 2.60 2.1 - 4.0 cm    FS 28 28 - 44 %    Sinus 2.55 cm    STJ 2.63 cm    Ascending aorta 2.39 cm    LV mass 160.71 g    LA size 3.92 cm    TAPSE 1.43 cm    Left Ventricle Relative Wall Thickness 0.72 cm    AV regurgitation pressure 1/2 time 492.485047478478712 ms    AV mean gradient 5 mmHg    AV valve area 1.57 cm2    AV Velocity Ratio 0.62     AV index (prosthetic) 0.64     MV mean gradient 1 mmHg    MV valve area by  continuity eq 2.33 cm2    E/A ratio 0.63     Mean e' 0.04 m/s    E wave deceleration time 291.411238830062327 msec    IVRT 125.698252593019831 msec    LVOT diameter 1.77 cm    LVOT area 2.5 cm2    LVOT peak mehrdad 0.93 m/s    LVOT peak VTI 24.40 cm    Ao peak mehrdad 1.50 m/s    Ao VTI 38.3 cm    RVOT peak mehrdad 0.74 m/s    RVOT peak VTI 19.0 cm    Mr max mehrdad 4.26 m/s    LVOT stroke volume 60.01 cm3    AV peak gradient 9 mmHg    MV peak gradient 2 mmHg    PV mean gradient 1.30 mmHg    E/E' ratio 14.29 m/s    MV Peak E Mehrdad 0.50 m/s    AR Max Mehrdad 3.66 m/s    TR Max Mehrdad 3.18 m/s    MV VTI 25.8 cm    MV Peak A Mehrdad 0.79 m/s    LV Systolic Volume 24.52 mL    LV Systolic Volume Index 15.4 mL/m2    LV Diastolic Volume 54.68 mL    LV Diastolic Volume Index 34.39 mL/m2    LV Mass Index 101 g/m2    RA Major Axis 4.03 cm    Left Atrium Minor Axis 5.00 cm    Left Atrium Major Axis 4.99 cm    Triscuspid Valve Regurgitation Peak Gradient 40 mmHg    RA Width 2.72 cm    Right Atrial Pressure (from IVC) 15 mmHg    EF 30 %    TV rest pulmonary artery pressure 55 mmHg    Narrative    · The left ventricle is normal in size with concentric hypertrophy and   moderately decreased systolic function.  · Grade I left ventricular diastolic dysfunction.  · The estimated PA systolic pressure is 55 mmHg.  · Normal right ventricular size with normal right ventricular systolic   function.  · There is pulmonary hypertension.  · Elevated central venous pressure (15 mmHg).  · The estimated ejection fraction is 30%.  · There are segmental left ventricular wall motion abnormalities.  · Mild aortic regurgitation.  · Mild mitral regurgitation.  · Mild tricuspid regurgitation.      , EKG: Reviewed, and X-Ray: CXR: X-Ray Chest 1 View (CXR): No results found for this visit on 12/15/22. and X-Ray Chest PA and Lateral (CXR): No results found for this visit on 12/15/22.

## 2022-12-16 PROBLEM — R41.0 DISORIENTATION: Status: ACTIVE | Noted: 2022-12-16

## 2022-12-16 LAB
ASCENDING AORTA: 3.1 CM
AV INDEX (PROSTH): 0.75
AV MEAN GRADIENT: 5 MMHG
AV PEAK GRADIENT: 8 MMHG
AV REGURGITATION PRESSURE HALF TIME: 786.78 MS
AV VALVE AREA: 1.85 CM2
AV VELOCITY RATIO: 0.64
BSA FOR ECHO PROCEDURE: 1.63 M2
CV ECHO LV RWT: 0.57 CM
DOP CALC AO PEAK VEL: 1.42 M/S
DOP CALC AO VTI: 42.3 CM
DOP CALC LVOT AREA: 2.5 CM2
DOP CALC LVOT DIAMETER: 1.77 CM
DOP CALC LVOT PEAK VEL: 0.91 M/S
DOP CALC LVOT STROKE VOLUME: 78.21 CM3
DOP CALC RVOT PEAK VEL: 0.65 M/S
DOP CALC RVOT VTI: 21.5 CM
DOP CALCLVOT PEAK VEL VTI: 31.8 CM
E WAVE DECELERATION TIME: 234.23 MSEC
E/A RATIO: 0.99
ECHO LV POSTERIOR WALL: 1.02 CM (ref 0.6–1.1)
EJECTION FRACTION: 60 %
FRACTIONAL SHORTENING: 31 % (ref 28–44)
INTERVENTRICULAR SEPTUM: 1.08 CM (ref 0.6–1.1)
IVC DIAMETER: 1.31 CM
IVRT: 99.9 MSEC
LA MAJOR: 4.5 CM
LA WIDTH: 3.2 CM
LEFT ATRIUM SIZE: 3.31 CM
LEFT INTERNAL DIMENSION IN SYSTOLE: 2.49 CM (ref 2.1–4)
LEFT VENTRICLE DIASTOLIC VOLUME INDEX: 33.76 ML/M2
LEFT VENTRICLE DIASTOLIC VOLUME: 54.01 ML
LEFT VENTRICLE MASS INDEX: 72 G/M2
LEFT VENTRICLE SYSTOLIC VOLUME INDEX: 13.8 ML/M2
LEFT VENTRICLE SYSTOLIC VOLUME: 22.07 ML
LEFT VENTRICULAR INTERNAL DIMENSION IN DIASTOLE: 3.59 CM (ref 3.5–6)
LEFT VENTRICULAR MASS: 115.38 G
LVOT MG: 2.35 MMHG
LVOT MV: 0.74 CM/S
MV PEAK A VEL: 0.78 M/S
MV PEAK E VEL: 0.77 M/S
MV STENOSIS PRESSURE HALF TIME: 67.93 MS
MV VALVE AREA P 1/2 METHOD: 3.24 CM2
PISA AR MAX VEL: 4.54 M/S
PISA TR MAX VEL: 3.5 M/S
POCT GLUCOSE: 119 MG/DL (ref 70–110)
POCT GLUCOSE: 92 MG/DL (ref 70–110)
POCT GLUCOSE: 93 MG/DL (ref 70–110)
PV MEAN GRADIENT: 1.17 MMHG
RA MAJOR: 3.98 CM
RA PRESSURE: 3 MMHG
RA WIDTH: 2.5 CM
RIGHT VENTRICULAR END-DIASTOLIC DIMENSION: 3.34 CM
STJ: 2.97 CM
TR MAX PG: 49 MMHG
TRICUSPID ANNULAR PLANE SYSTOLIC EXCURSION: 2.1 CM
TV REST PULMONARY ARTERY PRESSURE: 52 MMHG

## 2022-12-16 PROCEDURE — 95816 EEG AWAKE AND DROWSY: CPT

## 2022-12-16 PROCEDURE — 21400001 HC TELEMETRY ROOM

## 2022-12-16 PROCEDURE — 99232 SBSQ HOSP IP/OBS MODERATE 35: CPT | Mod: ,,, | Performed by: PHYSICIAN ASSISTANT

## 2022-12-16 PROCEDURE — 99223 PR INITIAL HOSPITAL CARE,LEVL III: ICD-10-PCS | Mod: ,,, | Performed by: PSYCHIATRY & NEUROLOGY

## 2022-12-16 PROCEDURE — 25000003 PHARM REV CODE 250: Performed by: NURSE PRACTITIONER

## 2022-12-16 PROCEDURE — 25000003 PHARM REV CODE 250: Performed by: INTERNAL MEDICINE

## 2022-12-16 PROCEDURE — 96372 THER/PROPH/DIAG INJ SC/IM: CPT | Performed by: INTERNAL MEDICINE

## 2022-12-16 PROCEDURE — 99223 1ST HOSP IP/OBS HIGH 75: CPT | Mod: ,,, | Performed by: PSYCHIATRY & NEUROLOGY

## 2022-12-16 PROCEDURE — 99232 PR SUBSEQUENT HOSPITAL CARE,LEVL II: ICD-10-PCS | Mod: ,,, | Performed by: PHYSICIAN ASSISTANT

## 2022-12-16 PROCEDURE — 63600175 PHARM REV CODE 636 W HCPCS: Performed by: INTERNAL MEDICINE

## 2022-12-16 RX ORDER — LISINOPRIL 20 MG/1
40 TABLET ORAL DAILY
Status: DISCONTINUED | OUTPATIENT
Start: 2022-12-16 | End: 2022-12-16

## 2022-12-16 RX ORDER — LOSARTAN POTASSIUM 50 MG/1
50 TABLET ORAL DAILY
Status: DISCONTINUED | OUTPATIENT
Start: 2022-12-16 | End: 2022-12-18

## 2022-12-16 RX ORDER — METOPROLOL TARTRATE 25 MG/1
25 TABLET, FILM COATED ORAL 2 TIMES DAILY
Status: DISCONTINUED | OUTPATIENT
Start: 2022-12-16 | End: 2022-12-16

## 2022-12-16 RX ORDER — METOPROLOL TARTRATE 25 MG/1
25 TABLET, FILM COATED ORAL DAILY
Status: DISCONTINUED | OUTPATIENT
Start: 2022-12-16 | End: 2022-12-16

## 2022-12-16 RX ORDER — METOPROLOL SUCCINATE 25 MG/1
25 TABLET, EXTENDED RELEASE ORAL DAILY
Status: DISCONTINUED | OUTPATIENT
Start: 2022-12-16 | End: 2022-12-19 | Stop reason: HOSPADM

## 2022-12-16 RX ADMIN — LOSARTAN POTASSIUM 50 MG: 50 TABLET, FILM COATED ORAL at 02:12

## 2022-12-16 RX ADMIN — HEPARIN SODIUM 5000 UNITS: 5000 INJECTION INTRAVENOUS; SUBCUTANEOUS at 09:12

## 2022-12-16 RX ADMIN — MELATONIN TAB 3 MG 6 MG: 3 TAB at 09:12

## 2022-12-16 RX ADMIN — HYDRALAZINE HYDROCHLORIDE 10 MG: 20 INJECTION, SOLUTION INTRAMUSCULAR; INTRAVENOUS at 11:12

## 2022-12-16 RX ADMIN — METOPROLOL SUCCINATE 25 MG: 25 TABLET, FILM COATED, EXTENDED RELEASE ORAL at 02:12

## 2022-12-16 RX ADMIN — HEPARIN SODIUM 5000 UNITS: 5000 INJECTION INTRAVENOUS; SUBCUTANEOUS at 05:12

## 2022-12-16 RX ADMIN — HEPARIN SODIUM 5000 UNITS: 5000 INJECTION INTRAVENOUS; SUBCUTANEOUS at 02:12

## 2022-12-16 NOTE — SUBJECTIVE & OBJECTIVE
Interval History: pt in bed upon exam with daughter at bedside. Pt oriented to person and birth date. Leg twitching reported. HR normalized. Lopressor dose adjusted and Losartan resumed at lower dose for BP BP control. EEG ordered due to leg twitching. Echo reviewed. Cardiology and Neurology following.     Review of Systems   Constitutional:  Negative for chills and fever.   HENT:  Negative for congestion, facial swelling, sinus pressure and trouble swallowing.    Respiratory:  Negative for cough, shortness of breath and wheezing.    Cardiovascular:  Negative for chest pain and palpitations.   Gastrointestinal:  Negative for abdominal distention, abdominal pain, nausea and vomiting.   Genitourinary:  Negative for difficulty urinating, dysuria, flank pain, frequency and urgency.   Musculoskeletal:  Negative for arthralgias, back pain and myalgias.   Skin:  Negative for color change and wound.   Neurological:  Positive for syncope.   Psychiatric/Behavioral:  Positive for confusion. Negative for sleep disturbance. The patient is not nervous/anxious.    All other systems reviewed and are negative.  Objective:     Vital Signs (Most Recent):  Temp: 98.6 °F (37 °C) (12/16/22 1131)  Pulse: 63 (12/16/22 1131)  Resp: 18 (12/16/22 1131)  BP: (!) 182/77 (12/16/22 1131)  SpO2: (!) 94 % (12/16/22 1131)   Vital Signs (24h Range):  Temp:  [97.6 °F (36.4 °C)-98.6 °F (37 °C)] 98.6 °F (37 °C)  Pulse:  [47-81] 63  Resp:  [16-20] 18  SpO2:  [93 %-98 %] 94 %  BP: (132-224)/(62-89) 182/77     Weight: 61.7 kg (136 lb)  Body mass index is 25.7 kg/m².    Intake/Output Summary (Last 24 hours) at 12/16/2022 1219  Last data filed at 12/15/2022 2000  Gross per 24 hour   Intake 790 ml   Output 900 ml   Net -110 ml      Physical Exam  HENT:      Head: Normocephalic and atraumatic.   Cardiovascular:      Rate and Rhythm: Regular rhythm. Bradycardia present.      Heart sounds: No murmur heard.  Pulmonary:      Effort: Pulmonary effort is normal. No  respiratory distress.      Breath sounds: Normal breath sounds. No wheezing.   Abdominal:      General: Bowel sounds are normal. There is no distension.      Palpations: Abdomen is soft.      Tenderness: There is no abdominal tenderness.   Musculoskeletal:         General: No swelling.   Skin:     General: Skin is warm and dry.   Neurological:      Mental Status: She is alert. Mental status is at baseline. She is disoriented.      Comments: Pt oriented to person and birth date- pt reoriented to place and time. Pt did not recognized daughter at bedside and introduced as mother x 2 occasions    Psychiatric:         Attention and Perception: Attention normal.         Speech: Speech normal.         Cognition and Memory: Cognition is impaired. She exhibits impaired recent memory.       Significant Labs: All pertinent labs within the past 24 hours have been reviewed.  CBC:   Recent Labs   Lab 12/15/22  1128   WBC 8.08   HGB 13.4   HCT 41.6        CMP:   Recent Labs   Lab 12/15/22  1128      K 4.0      CO2 29      BUN 18   CREATININE 0.7   CALCIUM 9.7   PROT 6.9   ALBUMIN 3.5   BILITOT 1.0   ALKPHOS 80   AST 21   ALT 15   ANIONGAP 11       Significant Imaging: I have reviewed all pertinent imaging results/findings within the past 24 hours.

## 2022-12-16 NOTE — ASSESSMENT & PLAN NOTE
Continue telemetry monitoring  - HR normalized  -cardiology following   -Lopressor resumed at decreased dose per cardiology - Observation continued

## 2022-12-16 NOTE — ASSESSMENT & PLAN NOTE
Per cardiology  EF 30-40% last admission  Repeat echo showed concentric remodeling and normal systolic function.   The estimated ejection fraction is 60%.   Normal left ventricular diastolic function.   Normal right ventricular size with normal right ventricular systolic function.   Mild aortic regurgitation.   There is moderate pulmonary hypertension.

## 2022-12-16 NOTE — SUBJECTIVE & OBJECTIVE
Review of Systems   Constitutional: Negative.   HENT: Negative.     Eyes: Negative.    Cardiovascular: Negative.    Respiratory: Negative.     Endocrine: Negative.    Hematologic/Lymphatic: Negative.    Skin: Negative.    Musculoskeletal:  Positive for arthritis and joint pain.   Gastrointestinal: Negative.    Genitourinary: Negative.    Neurological: Negative.    Psychiatric/Behavioral: Negative.     Allergic/Immunologic: Negative.    Objective:     Vital Signs (Most Recent):  Temp: 98.6 °F (37 °C) (12/16/22 1131)  Pulse: 77 (12/16/22 1327)  Resp: 18 (12/16/22 1131)  BP: (!) 182/77 (12/16/22 1131)  SpO2: (!) 94 % (12/16/22 1131)   Vital Signs (24h Range):  Temp:  [97.9 °F (36.6 °C)-98.6 °F (37 °C)] 98.6 °F (37 °C)  Pulse:  [59-77] 77  Resp:  [16-18] 18  SpO2:  [93 %-95 %] 94 %  BP: (132-185)/(62-78) 182/77     Weight: 61.7 kg (136 lb)  Body mass index is 25.7 kg/m².     SpO2: (!) 94 %         Intake/Output Summary (Last 24 hours) at 12/16/2022 1641  Last data filed at 12/15/2022 2000  Gross per 24 hour   Intake 540 ml   Output 900 ml   Net -360 ml       Lines/Drains/Airways       Drain  Duration             Female External Urinary Catheter 12/15/22 1400 1 day              Peripheral Intravenous Line  Duration                  Peripheral IV - Single Lumen Anterior;Proximal;Right Forearm -- days                    Physical Exam  Vitals and nursing note reviewed.   Constitutional:       General: She is not in acute distress.     Appearance: Normal appearance. She is well-developed. She is not diaphoretic.   HENT:      Head: Normocephalic and atraumatic.   Eyes:      General:         Right eye: No discharge.         Left eye: No discharge.      Pupils: Pupils are equal, round, and reactive to light.   Neck:      Thyroid: No thyromegaly.      Vascular: No JVD.      Trachea: No tracheal deviation.   Cardiovascular:      Rate and Rhythm: Normal rate and regular rhythm.      Heart sounds: Normal heart sounds, S1 normal  and S2 normal. No murmur heard.  Pulmonary:      Effort: Pulmonary effort is normal. No respiratory distress.      Breath sounds: Normal breath sounds. No wheezing or rales.   Abdominal:      General: There is no distension.      Palpations: Abdomen is soft.      Tenderness: There is no rebound.   Musculoskeletal:      Cervical back: Neck supple.      Right lower leg: No edema.      Left lower leg: No edema.   Skin:     General: Skin is warm and dry.      Findings: No erythema.   Neurological:      General: No focal deficit present.      Mental Status: She is alert and oriented to person, place, and time.   Psychiatric:         Mood and Affect: Mood normal.         Behavior: Behavior normal.         Thought Content: Thought content normal.       Significant Labs: CMP   Recent Labs   Lab 12/15/22  1128      K 4.0      CO2 29      BUN 18   CREATININE 0.7   CALCIUM 9.7   PROT 6.9   ALBUMIN 3.5   BILITOT 1.0   ALKPHOS 80   AST 21   ALT 15   ANIONGAP 11   , CBC   Recent Labs   Lab 12/15/22  1128   WBC 8.08   HGB 13.4   HCT 41.6      , Troponin   Recent Labs   Lab 12/15/22  1128   TROPONINI 0.021   , and All pertinent lab results from the last 24 hours have been reviewed.    Significant Imaging: Echocardiogram: Transthoracic echo (TTE) complete (Cupid Only):   Results for orders placed or performed during the hospital encounter of 12/15/22   Echo   Result Value Ref Range    BSA 1.63 m2    LA WIDTH 3.20 cm    IVC diameter 1.31 cm    Left Ventricular Outflow Tract Mean Velocity 0.74 cm/s    Left Ventricular Outflow Tract Mean Gradient 2.35 mmHg    LVIDd 3.59 3.5 - 6.0 cm    IVS 1.08 0.6 - 1.1 cm    Posterior Wall 1.02 0.6 - 1.1 cm    LVIDs 2.49 2.1 - 4.0 cm    FS 31 28 - 44 %    STJ 2.97 cm    Ascending aorta 3.10 cm    LV mass 115.38 g    LA size 3.31 cm    RVDD 3.34 cm    TAPSE 2.10 cm    Left Ventricle Relative Wall Thickness 0.57 cm    AV regurgitation pressure 1/2 time [id number] ms     AV mean gradient 5 mmHg    AV valve area 1.85 cm2    AV Velocity Ratio 0.64     AV index (prosthetic) 0.75     MV valve area p 1/2 method 3.24 cm2    E/A ratio 0.99     E wave deceleration time 234.23 msec    IVRT 99.90 msec    LVOT diameter 1.77 cm    LVOT area 2.5 cm2    LVOT peak mehrdad 0.91 m/s    LVOT peak VTI 31.80 cm    Ao peak mehrdad 1.42 m/s    Ao VTI 42.3 cm    RVOT peak mehrdad 0.65 m/s    RVOT peak VTI 21.5 cm    LVOT stroke volume 78.21 cm3    AV peak gradient 8 mmHg    PV mean gradient 1.17 mmHg    MV Peak E Mehrdad 0.77 m/s    AR Max Mehrdad 4.54 m/s    TR Max Mehrdad 3.50 m/s    MV stenosis pressure 1/2 time 67.93 ms    MV Peak A Mehrdad 0.78 m/s    LV Systolic Volume 22.07 mL    LV Systolic Volume Index 13.8 mL/m2    LV Diastolic Volume 54.01 mL    LV Diastolic Volume Index 33.76 mL/m2    LV Mass Index 72 g/m2    RA Major Axis 3.98 cm    Left Atrium Major Axis 4.50 cm    Triscuspid Valve Regurgitation Peak Gradient 49 mmHg    RA Width 2.50 cm    Right Atrial Pressure (from IVC) 3 mmHg    EF 60 %    TV rest pulmonary artery pressure 52 mmHg    Narrative    · The left ventricle is normal in size with concentric remodeling and   normal systolic function.  · The estimated ejection fraction is 60%.  · Normal left ventricular diastolic function.  · Normal right ventricular size with normal right ventricular systolic   function.  · Mild aortic regurgitation.  · There is moderate pulmonary hypertension.  · Normal central venous pressure (3 mmHg).  · The estimated PA systolic pressure is 52 mmHg.      , EKG: Reviewed, and X-Ray: CXR: X-Ray Chest 1 View (CXR): No results found for this visit on 12/15/22. and X-Ray Chest PA and Lateral (CXR): No results found for this visit on 12/15/22.

## 2022-12-16 NOTE — ASSESSMENT & PLAN NOTE
Likely multifactorial  Hold beta blocker  Continue IVFs  Repeat orthostatic vitals in am  Echo results results reviewed   -Seizure not excluded with EEG per Neurology   Await further  -CT of head ordered due to increased confusion over the last month reported   -PT/OT

## 2022-12-16 NOTE — HOSPITAL COURSE
"12/16/22-Patient seen and examined today, resting in bed. Feeling better. Reported some "jerking movements yesterday in bilateral legs" that have since resolved. CT of head negative. HR improved, BB resumed but at lower dose. CT of head showed NAF.    12/17/2022  STATUS QUO BP SEEMS LABILE WAS BETETR CONTROLLED YESTERDAY. WILL CONTINUE SAME MEDS.     12/19/22-Patient seen and examined today, sitting up in bedside chair. Feels well, back to her baseline. Ambulated earlier with PT/OT without issue. BP still fluctuating but seems better controlled.   "

## 2022-12-16 NOTE — ASSESSMENT & PLAN NOTE
-confusion noted upon exam   -increased episodes of confusion reported per daughter at bedside   -CT of head results pending    -UA and chest xray negative for infectious process   -Syncope work up in progress  -neuro checks   -Neurology consulted- EEG pending

## 2022-12-16 NOTE — PLAN OF CARE
Plan of Care: RN Shift Summary & Bedside Handover Report  12/16/2022 1:32 PM    Pt admitted on 12/15/2022 for Syncope under Rudy Nicole, * service   Code Status Full Code    Past Medical/ Surgical Hx Past Medical History:   Diagnosis Date    Arthritis     Cataract     GERD (gastroesophageal reflux disease)     Heart attack 05/18/2022    Heart attack 05/23/2022    Hyperlipidemia     Hypertension     Stroke       Past Surgical History:   Procedure Laterality Date    ADENOIDECTOMY      ADRENAL GLAND SURGERY      APPENDECTOMY      BACK SURGERY      fusion l 4-5 s 1,2,3  fusion l 2-3    CORONARY ANGIOGRAPHY N/A 5/20/2022    Procedure: ANGIOGRAM, CORONARY ARTERY;  Surgeon: Domingo Martins MD;  Location: [facility] CATH LAB;  Service: Cardiology;  Laterality: N/A;    CORONARY ANGIOGRAPHY N/A 5/24/2022    Procedure: ANGIOGRAM, CORONARY ARTERY;  Surgeon: Domingo Martins MD;  Location: [facility] CATH LAB;  Service: Cardiology;  Laterality: N/A;    EYE SURGERY      HEMORRHOID SURGERY      HERNIA REPAIR      HYSTERECTOMY      indirect lumbar decompression      percutaneous placement of interspinous extension blocker    LEFT HEART CATHETERIZATION Left 5/20/2022    Procedure: CATHETERIZATION, HEART, LEFT;  Surgeon: Domingo Martins MD;  Location: [facility] CATH LAB;  Service: Cardiology;  Laterality: Left;    TONSILLECTOMY         HEENT HEENT WDL: WDL   Cognitive/ Neuro Cognitive/Neuro/Behavioral WDL: WDL  Level of Consciousness (AVPU): alert  Orientation: person, place  [place] Coma Scale Score: 15  Additional Documentation: [place] Coma Scale (Group), Hand /Ankle Strength (Group), Motor Response (Group), Pupils (Group)   Resp Respiratory WDL: WDL               Cardiac Cardiac WDL: WDL  Lead Monitored: Lead II  Rhythm: normal sinus rhythm     Cardiac/Telemetry Box Number: [id number]   Peripheral/ Neurovascular Peripheral Neurovascular WDL: WDL   VTE core     GI GI WDL: WDL     Last Bowel Movement: 12/15/22   Diet Diet Cardiac     Genitourinary WDL: WDL except, voiding ability/characteristics  Voiding Characteristics: external catheter      Skin Skin WDL: WDL      Oliver Score Oliver Score: 19   Musculoskeletal  Musculoskeletal WDL: WDL except, mobility  General Mobility: generalized weakness   Safety Safety WDL: WDL  Safety Factors: ID band on, upper side rails raised x 2, call light in reach, wheels locked, bed in low position  Safety Precautions: emergency equipment at bedside   Fall Risk Score Fall Risk Score: 16   LDA(s) Lines/Drains/Airways       Drain  Duration             Female External Urinary Catheter 12/15/22 1400 <1 day              Peripheral Intravenous Line  Duration                  Peripheral IV - Single Lumen Anterior;Proximal;Right Forearm -- days                   Consults Consults (From admission, onward)          Status Ordering Provider     Inpatient consult to Neurology  Once        Provider:  Gene Frederick MD    Completed HENRY BAUGH     IP consult to case management  Once        Provider:  (Not yet assigned)    Completed HENRY BAUGH     Inpatient consult to Cardiology  Once        Provider:  Jaqui Granadso MD    Completed DARWIN LUONG            Shift events  EEG pending, CTH completed, ammonia level ordered for a.m. draw, daily meds reordered   Plan of Care for RN report  Continue care as ordered, DM management, Neuro checks, PT/OT ordered, BP management    Discharge Planning Discharge Plan A: Skilled Nursing Facility    Patient/ family updated on plan of care, all questions answered up to now regarding plan of care, will continue to monitor per hourly rounding & unit practice/ policy    Note: Other exception details noted in flowsheet if not present in summary

## 2022-12-16 NOTE — PROGRESS NOTES
[facility] - Telemetry ([facility] [facility] Medicine  Progress Note    Patient Name: Leslie Wood  MRN: [id number]  Patient Class: OP- Observation   Admission Date: 12/15/2022  Length of Stay: 0 days  Attending Physician: Rudy Nicole, *  Primary Care Provider: Angeles Carson MD        Subjective:     Principal Problem:Syncope        HPI:  The patient is 81 yo female with past medical history of arthritis, GERD, CAD, MI, hypertension, COVID, cardiomyopathy and stroke who presented to the ED with multiple syncopal episodes. The patient is at [place] for [facility] care due to a significant fall in November. She has had 2 episodes  this week. This morning she was sitting at the table when she  suddenly lost consciousness. She denied chest pain, SOB, lightheadedness, or palpitations. She was noted to have heart rate in the 40s. She was orthostatic by blood pressure. Cardiology was consulted as her medications were recently adjusted. Patient feels she is at her baseline. Her syncopal episodes have all occurred at  rest while seated. Echo pending. Hospital medicine consulted for continuation of care. Patient placed in observation.      Overview/Hospital Course:  Pt admitted to Observation for Syncope in the setting Symptomatic bradycardia. Cardiology consulted and beta blocker held. Echo showed concentric remodeling and normal systolic function. EF 60%. Mild aortic regurgitation and moderate pulmonary hypertension. Neurology consulted with seizures considered and EEG ordered. CT of head pending in the setting of increased confusion post fall and over the last month . Infectious process ruled out with UA and chest xray unremarkable. HR normalized with case discussed with Cardiology and Lopressor resumed at decreased dose. Losartan resumed for BP control.       Interval History: pt in bed upon exam with daughter at bedside. Pt oriented to person and birth date. Leg twitching reported. HR normalized. Lopressor dose  adjusted and Losartan resumed at lower dose for BP BP control. EEG ordered due to leg twitching. Echo reviewed. Cardiology and Neurology following.     Review of Systems   Constitutional:  Negative for chills and fever.   HENT:  Negative for congestion, facial swelling, sinus pressure and trouble swallowing.    Respiratory:  Negative for cough, shortness of breath and wheezing.    Cardiovascular:  Negative for chest pain and palpitations.   Gastrointestinal:  Negative for abdominal distention, abdominal pain, nausea and vomiting.   Genitourinary:  Negative for difficulty urinating, dysuria, flank pain, frequency and urgency.   Musculoskeletal:  Negative for arthralgias, back pain and myalgias.   Skin:  Negative for color change and wound.   Neurological:  Positive for syncope.   Psychiatric/Behavioral:  Positive for confusion. Negative for sleep disturbance. The patient is not nervous/anxious.    All other systems reviewed and are negative.  Objective:     Vital Signs (Most Recent):  Temp: 98.6 °F (37 °C) (12/16/22 1131)  Pulse: 63 (12/16/22 1131)  Resp: 18 (12/16/22 1131)  BP: (!) 182/77 (12/16/22 1131)  SpO2: (!) 94 % (12/16/22 1131)   Vital Signs (24h Range):  Temp:  [97.6 °F (36.4 °C)-98.6 °F (37 °C)] 98.6 °F (37 °C)  Pulse:  [47-81] 63  Resp:  [16-20] 18  SpO2:  [93 %-98 %] 94 %  BP: (132-224)/(62-89) 182/77     Weight: 61.7 kg (136 lb)  Body mass index is 25.7 kg/m².    Intake/Output Summary (Last 24 hours) at 12/16/2022 1219  Last data filed at 12/15/2022 2000  Gross per 24 hour   Intake 790 ml   Output 900 ml   Net -110 ml      Physical Exam  HENT:      Head: Normocephalic and atraumatic.   Cardiovascular:      Rate and Rhythm: Regular rhythm. Bradycardia present.      Heart sounds: No murmur heard.  Pulmonary:      Effort: Pulmonary effort is normal. No respiratory distress.      Breath sounds: Normal breath sounds. No wheezing.   Abdominal:      General: Bowel sounds are normal. There is no distension.       Palpations: Abdomen is soft.      Tenderness: There is no abdominal tenderness.   Musculoskeletal:         General: No swelling.   Skin:     General: Skin is warm and dry.   Neurological:      Mental Status: She is alert. Mental status is at baseline. She is disoriented.      Comments: Pt oriented to person and birth date- pt reoriented to place and time. Pt did not recognized daughter at bedside and introduced as mother x 2 occasions    Psychiatric:         Attention and Perception: Attention normal.         Speech: Speech normal.         Cognition and Memory: Cognition is impaired. She exhibits impaired recent memory.       Significant Labs: All pertinent labs within the past 24 hours have been reviewed.  CBC:   Recent Labs   Lab 12/15/22  1128   WBC 8.08   HGB 13.4   HCT 41.6        CMP:   Recent Labs   Lab 12/15/22  1128      K 4.0      CO2 29      BUN 18   CREATININE 0.7   CALCIUM 9.7   PROT 6.9   ALBUMIN 3.5   BILITOT 1.0   ALKPHOS 80   AST 21   ALT 15   ANIONGAP 11       Significant Imaging: I have reviewed all pertinent imaging results/findings within the past 24 hours.      Assessment/Plan:      * Syncope  Likely multifactorial  Hold beta blocker  Continue IVFs  Repeat orthostatic vitals in am  Echo results results reviewed   -Seizure not excluded with EEG per Neurology   Await further  -CT of head ordered due to increased confusion over the last month reported   -PT/OT     Bradycardia  Continue telemetry monitoring  - HR normalized  -cardiology following   -Lopressor resumed at decreased dose per cardiology - Observation continued       Stress-induced cardiomyopathy  Per cardiology  EF 30-40% last admission  Repeat echo showed concentric remodeling and normal systolic function.   The estimated ejection fraction is 60%.   Normal left ventricular diastolic function.   Normal right ventricular size with normal right ventricular systolic function.   Mild aortic  regurgitation.   There is moderate pulmonary hypertension.      Altered mental state  -confusion noted upon exam   -increased episodes of confusion reported per daughter at bedside over the last month   -hx of CVA noted-   -CT of head results pending    -UA and chest xray negative for infectious process   -Syncope work up in progress  -neuro checks   -Neurology consulted- EEG pending   -Ammonia in am     Type 2 diabetes mellitus without complication, without long-term current use of insulin  Patient's FSGs are controlled with diet.  Last A1c reviewed-   Lab Results   Component Value Date    HGBA1C 5.3 12/15/2022     Most recent fingerstick glucose reviewed-100   Current correctional scale  Low  Maintain anti-hyperglycemic dose as follows-   Antihyperglycemics (From admission, onward)    Start     Stop Route Frequency Ordered    12/15/22 1632  insulin aspart U-100 pen 0-5 Units         -- SubQ Before meals & nightly PRN 12/15/22 1535        Hold Oral hypoglycemics while patient is in the hospital.    Essential hypertension  Hold beta blocker due to bradycardia  -hx of labile BP noted    IV hydralazine with parameters   Possible doxazosin based upon BP per cardiology  Monitor blood pressure  -ARB resumed on 12/16/22 for control         VTE Risk Mitigation (From admission, onward)         Ordered     heparin (porcine) injection 5,000 Units  Every 8 hours         12/15/22 1549     Place sequential compression device  Until discontinued         12/15/22 1546                Discharge Planning   JOSE:      Code Status: Full Code   Is the patient medically ready for discharge?:     Reason for patient still in hospital (select all that apply): Patient trending condition, Laboratory test, Treatment, Imaging, Consult recommendations and PT / OT recommendations  Discharge Plan A: Skilled Nursing Facility                  Delmy Gupta NP  Department of Hospital Medicine   [facility] - Telemetry ([facility])

## 2022-12-16 NOTE — ASSESSMENT & PLAN NOTE
-EF previously 30-40% during prior admission  -Repeat echo pending  -BB held due to bradycardia  -Continue ACEi as tolerated    12/16/22  -EF recovered by recent echo

## 2022-12-16 NOTE — CONSULTS
"[facility] ([facility])  Neurology  Consult Note    Patient Name: Leslie Wood  MRN: [id number]  Admission Date: 12/15/2022  Hospital Length of Stay: 0 days  Code Status: Full Code   Attending Provider: Rudy Nicole, *   Consulting Provider: Gene Frederick MD  Primary Care Physician: Angeles Carson MD  Principal Problem:Syncope    Inpatient consult to Neurology  Consult performed by: Gene Frederick MD  Consult ordered by: Valarie Hendricks MD      Subjective:     Chief Complaint:  Loss of awareness     HPI: The patient and her daughter indicate that the patient has had recurrent episodes of sudden loss of awareness, with apparent LOC.    The patient denies any premonitory symptoms such as dizziness, light headedness, nausea, headache, vision disturbance.  The daughter has not witnessed any of the episodes, but indicates that these have been seen by personnel in SNF.  The daughter indicates that she has been told that the patient was seen to slump over at the dining room table, and on another occasion, seemed to "go to sleep" while performing exercises with physical therapy.    None of the episodes have occurred when supine.  None have been associated with incontinence, tongue biting, or excessive salivation.   There is no report of focal or lateralized weakness after the event.  There is no obvious postictal drowsiness and no complaint of headache afterward.  The daughter does indicate that the patient has been more somnolent lately.    Past Medical History:   Diagnosis Date    Arthritis     Cataract     GERD (gastroesophageal reflux disease)     Heart attack 05/18/2022    Heart attack 05/23/2022    Hyperlipidemia     Hypertension     Stroke        Past Surgical History:   Procedure Laterality Date    ADENOIDECTOMY      ADRENAL GLAND SURGERY      APPENDECTOMY      BACK SURGERY      fusion l 4-5 s 1,2,3  fusion l 2-3    CORONARY ANGIOGRAPHY N/A 5/20/2022    Procedure: ANGIOGRAM, CORONARY " ARTERY;  Surgeon: Domingo Martins MD;  Location: [facility] CATH LAB;  Service: Cardiology;  Laterality: N/A;    CORONARY ANGIOGRAPHY N/A 5/24/2022    Procedure: ANGIOGRAM, CORONARY ARTERY;  Surgeon: Domingo Martins MD;  Location: [facility] CATH LAB;  Service: Cardiology;  Laterality: N/A;    EYE SURGERY      HEMORRHOID SURGERY      HERNIA REPAIR      HYSTERECTOMY      indirect lumbar decompression      percutaneous placement of interspinous extension blocker    LEFT HEART CATHETERIZATION Left 5/20/2022    Procedure: CATHETERIZATION, HEART, LEFT;  Surgeon: Domingo Martins MD;  Location: [facility] CATH LAB;  Service: Cardiology;  Laterality: Left;    TONSILLECTOMY         Review of patient's allergies indicates:   Allergen Reactions    Acetaminophen     Amitriptyline     Hydrochlorothiazide      Causes muscle cramping    Lisinopril      hyperkalemia    Oxycodone     Percocet [oxycodone-acetaminophen] Other (See Comments)     Seizures    Belbuca [buprenorphine hcl] Nausea And Vomiting and Other (See Comments)     Black out     Codeine Nausea Only and Rash    Prazosin Other (See Comments)     dizziness       Current Neurological Medications: See list below    No current facility-administered medications on file prior to encounter.     Current Outpatient Medications on File Prior to Encounter   Medication Sig    ascorbic acid, vitamin C, (VITAMIN C) 500 MG tablet Take 1 tablet by mouth every morning.    atorvastatin (LIPITOR) 80 MG tablet Take 1 tablet by mouth every morning.    DULoxetine (CYMBALTA) 30 MG capsule Take 30 mg by mouth once daily.    furosemide (LASIX) 20 MG tablet Take 1 tablet (20 mg total) by mouth once daily.    metoprolol succinate (TOPROL-XL) 50 MG 24 hr tablet Take 1 tablet (50 mg total) by mouth once daily.    ramipriL (ALTACE) 10 MG capsule Take 10 mg by mouth once daily.    NUCYNTA 50 mg Tab Take 1 tablet (50 mg total) by mouth every 8 (eight) hours as needed (severe pain).    traMADoL (ULTRAM) 50 mg  tablet Take 1 tablet (50 mg total) by mouth every 12 (twelve) hours as needed (moderate pain).    [DISCONTINUED] carvediloL (COREG) 6.25 MG tablet Take 1 tablet (6.25 mg total) by mouth 2 (two) times daily. TAKE (1) TABLET BY MOUTH 2 TIMES A DAY WITH MEALS    [DISCONTINUED] cloNIDine (CATAPRES) 0.1 MG tablet Take 1 tablet (0.1 mg total) by mouth 2 (two) times daily. To take an extra dose if BP >160/90    [DISCONTINUED] diclofenac sodium (VOLTAREN) 1 % Gel Apply 2 g topically 3 (three) times daily.    [DISCONTINUED] isosorbide mononitrate (IMDUR) 30 MG 24 hr tablet TAKE 1 TABLET BY MOUTH TWICE A DAY    [DISCONTINUED] metoprolol tartrate (LOPRESSOR) 25 MG tablet Take 1 tablet (25 mg total) by mouth 3 (three) times daily.    [DISCONTINUED] nitroGLYCERIN (NITROSTAT) 0.4 MG SL tablet Place 1 tablet (0.4 mg total) under the tongue every 5 (five) minutes as needed for Chest pain.    [DISCONTINUED] valsartan (DIOVAN) 160 MG tablet TAKE 1 TABLET BY MOUTH TWICE A DAY      Family History       Problem Relation (Age of Onset)    Breast cancer Sister    Diabetes Mother    Heart disease Mother    Hypertension Mother, Father    Kidney disease Father          Tobacco Use    Smoking status: Never    Smokeless tobacco: Never   Substance and Sexual Activity    Alcohol use: No    Drug use: No    Sexual activity: Not Currently     Review of Systems    ROS:  GENERAL: No fever, chills, or weight loss.  SKIN: No rashes, itching or changes in color or texture of skin.  HEAD: See comments in present illness  EYES: Is aware of slight visual field loss to the left after prior stroke.No photophobia, ocular pain or diplopia.  EARS: Denies ear pain, discharge or vertigo.  NOSE: No loss of smell, no epistaxis or postnasal drip.  MOUTH & THROAT: No hoarseness or change in voice. No excessive gum bleeding.  NODES: Denies swollen glands.  CHEST: Denies FUENTES, cyanosis, wheezing, cough and sputum production.  CARDIOVASCULAR: Denies chest pain, PND,  orthopnea or reduced exercise tolerance.  ABDOMEN: Appetite fine. No weight loss. Denies diarrhea, abdominal pain, hematemesis or blood in stool.  URINARY: No flank pain, dysuria or hematuria.  PERIPHERAL VASCULAR: No claudication or cyanosis.  MUSCULOSKELETAL: No joint stiffness or swelling. Denies back pain.  NEUROLOGIC: No history of seizures, paralysis, alteration of gait or coordination.     Objective:     Vital Signs (Most Recent):  Temp: 98.6 °F (37 °C) (12/16/22 0748)  Pulse: 61 (12/16/22 0907)  Resp: 18 (12/16/22 0748)  BP: (!) 185/78 (12/16/22 0748)  SpO2: 95 % (12/16/22 0748)   Vital Signs (24h Range):  Temp:  [97.6 °F (36.4 °C)-98.6 °F (37 °C)] 98.6 °F (37 °C)  Pulse:  [47-81] 61  Resp:  [16-20] 18  SpO2:  [93 %-98 %] 95 %  BP: (132-224)/(62-89) 185/78     Weight: 61.7 kg (136 lb)  Body mass index is 25.7 kg/m².    Physical Exam  APPEARANCE: Well nourished, well developed, elderly woman in no acute distress in her hospital bed.  HEAD: Normocephalic, atraumatic.  EYES: PERRL. EOMI.  Non-icteric sclerae.     NOSE: Mucosa pink. Airway clear.  MOUTH & THROAT: No tonsillar enlargement   Uvula is midline.  NECK: Supple. No bruits.  CHEST: Lungs clear to auscultation.  CARDIOVASCULAR: Regular rhythm without significant murmurs.  MUSCULOSKELETAL:  No bony deformity seen.   NEUROLOGIC:   Mental Status:  The patient is well oriented to person, time, place, and situation.  The patient is attentive to the environment and cooperative for the exam.  Cranial Nerves: II-XII grossly intact. Fundoscopic exam is normal.  No hemorrhage, exudate or papilledema is present. The extraocular muscles are intact in the cardinal directions of gaze.  No ptosis is present. Facial features are symmetrical.  Speech is normal in fluency, diction, and phrasing.  Tongue protrudes in the midline.    Gait and Station:  Gait not tested.  Patient is confined to bed.  Motor:  No downdrift of either arm when held at shoulder level.  Manual  muscle testing of proximal and distal muscles of both upper and lower extremities is normal. Muscle mass and muscle tone are normal both upper and both lower extremities.  Sensory:  Intact both upper and lower extremities to pin prick, touch, and vibration.  Cerebellar:  Finger to nose done well wi;th slight intention tremor. Alternating movements intact.  No involuntary movements or tremor seen.  Reflexes:  Stretch reflexes are 2+ both upper and lower extremities.  Plantar stimulation is flexor bilaterally and no pathological reflexes are seen           Significant Labs: CBC:   Recent Labs   Lab 12/15/22  1128   WBC 8.08   HGB 13.4   HCT 41.6        CMP:   Recent Labs   Lab 12/15/22  1128         K 4.0      CO2 29   BUN 18   CREATININE 0.7   CALCIUM 9.7   PROT 6.9   ALBUMIN 3.5   BILITOT 1.0   ALKPHOS 80   AST 21   ALT 15   ANIONGAP 11     Recent Lab Results  (Last 5 results in the past 24 hours)        12/16/22  0458   12/15/22  2149   12/15/22  1601   12/15/22  1512   12/15/22  1215        Ascending aorta       3.10         Ao peak mehrdad       1.42         Ao VTI       42.3         Appearance, UA         Clear       AR Max Mehrdad       4.54         AV valve area       1.85         AV mean gradient       5         AV index (prosthetic)       0.75         AV peak gradient       8         AV regurgitation pressure 1/2 time       [id number]         AV Velocity Ratio       0.64         Bacteria, UA         Rare       Bilirubin (UA)         Negative       BSA       1.63         Color, UA         Colorless       Left Ventricle Relative Wall Thickness       0.57         E/A ratio       0.99         EF       60         E wave deceleration time       234.23         FS       31         Glucose, UA         Negative       IVC diameter       1.31         IVRT       99.90         IVSd       1.08         Ketones, UA         Negative       LA WIDTH       3.20         Left Atrium Major Axis        4.50         LA size       3.31         LVOT area       2.5         Leukocytes, UA         2+       LV EDV BP       54.01         LV Diastolic Volume Index       33.76         LVIDd       3.59         LVIDs       2.49         LV mass       115.38         LV Mass Index       72         Left Ventricular Outflow Tract Mean Gradient       2.35         Left Ventricular Outflow Tract Mean Velocity       0.74         LVOT diameter       1.77         LVOT peak mehradd       0.91         LVOT stroke volume       78.21         LVOT peak VTI       31.80         LV ESV BP       22.07         LV Systolic Volume Index       13.8         Microscopic Comment         SEE COMMENT  Comment: Other formed elements not mentioned in the report are not   present in the microscopic examination.          MV valve area p 1/2 method       3.24         MV Peak A Mehrdad       0.78         MV Peak E Mehrdad       0.77         MV stenosis pressure 1/2 time       67.93         NITRITE UA         Negative       Occult Blood UA         Negative       pH, UA         7.0       POCT Glucose 93   81   85           Protein, UA         Negative  Comment: Recommend a 24 hour urine protein or a urine   protein/creatinine ratio if globulin induced proteinuria is  clinically suspected.         PV mean gradient       1.17         Posterior Wall       1.02         Right Atrial Pressure (from IVC)       3         RA Major Axis       3.98         RA Width       2.50         RVDD       3.34         RVOT peak mehrdad       0.65         RVOT peak VTI       21.5         Specific Elizabeth, UA         1.010       Specimen UA         Urine, Catheterized  Comment: external       Squam Epithel, UA         2       STJ       2.97         TAPSE       2.10         Triscuspid Valve Regurgitation Peak Gradient       49         TR Max Mehrdad       3.50         TV rest pulmonary artery pressure       52         UROBILINOGEN UA         Negative       WBC, UA         3                            All  pertinent lab results from the past 24 hours have been reviewed.    Significant Imaging: I have reviewed all pertinent imaging results/findings within the past 24 hours.    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  RECOMMENDATIONS   EEG.  Because of prior stroke, she certainly is at risk for seizure, although history is more consistent with orthostasis.    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 I will follow-up with patient. Please contact us if you have any additional questions.    Gene Frederick MD  Neurology  [facility] ([facility])

## 2022-12-16 NOTE — PLAN OF CARE
[facility] (Hospital)  Initial Discharge Assessment       Primary Care Provider: Angeles Carson MD    Admission Diagnosis: Syncope and collapse [R55]  Symptomatic bradycardia [R00.1]    Admission Date: 12/15/2022  Expected Discharge Date:     Discharge Barriers Identified: None    Payor: MEDICARE / Plan: MEDICARE PART A & B / Product Type: Government /     Extended Emergency Contact Information  Primary Emergency Contact: Vicki Zafar   [facility]  Home Phone: [phone]  Relation: Daughter  Secondary Emergency Contact: maeve zafar  Mobile Phone: [phone]  Relation: Grandchild  Preferred language: English   needed? No    Discharge Plan A: Skilled Nursing Facility  Discharge Plan B: Home Health      [facility] - [place], [place] - [address]  [address]  [facility] [address]  Phone: [phone] Fax: [phone]    [facility] - [place], [place] - [address]  [address]  [place] [address]  Phone: [phone] Fax: [phone]      Initial Assessment (most recent)       Adult Discharge Assessment - 12/16/22 1158          Discharge Assessment    Assessment Type Discharge Planning Assessment     Confirmed/corrected address, phone number and insurance Yes     Confirmed Demographics Correct on Facesheet     Source of Information patient;family     Does patient/caregiver understand observation status Yes     Communicated JOSE with patient/caregiver Date not available/Unable to determine     Reason For Admission Syncope     People in Home child(godwin), adult;grandchild(godwin)     Facility Arrived From: [facility]     Do you expect to return to your current living situation? Yes     Do you have help at home or someone to help you manage your care at home? Yes     Who are your caregiver(s) and their phone number(s)? daughter     Prior to hospitilization cognitive status: Alert/Oriented     Current cognitive status: Alert/Oriented     Walking or  Climbing Stairs ambulation difficulty, requires equipment     Mobility Management cane, WC, walker, rolling walker     Dressing/Bathing bathing difficulty, requires equipment     Dressing/Bathing Management BSC, Shower chair     Home Accessibility wheelchair accessible     Equipment Currently Used at Home bedside commode;cane, straight;rollator;shower chair;walker, rolling;wheelchair     Readmission within 30 days? No     Patient currently being followed by outpatient case management? No     Do you currently have service(s) that help you manage your care at home? No     Do you take prescription medications? Yes     Do you have prescription coverage? Yes     Coverage Medicare, BCBS     Do you have any problems affording any of your prescribed medications? No     Is the patient taking medications as prescribed? yes     Who is going to help you get home at discharge? daughter     How do you get to doctors appointments? family or friend will provide     Are you on dialysis? No     Do you take coumadin? No     Discharge Plan A Skilled Nursing Facility     Discharge Plan B Home Health     DME Needed Upon Discharge  none     Discharge Plan discussed with: Patient;Adult children     Discharge Barriers Identified None                        Anticipated DC dispo: Skilled Nursing Facility  Prior Level of Function: dependent with ADL's, lives with daughter to help  PCP: Angeles Carson MD    Comments:  CM met with patient at bedside to introduce role and discuss discharge planning. Patient lives with daughter and granddaughter who will also be help at home and can provide transport at time of discharge. CM discharge needs depends on hospital progress. CM will continue following to assist with other needs.

## 2022-12-16 NOTE — ASSESSMENT & PLAN NOTE
Hold beta blocker due to bradycardia  -hx of labile BP noted    IV hydralazine with parameters   Possible doxazosin based upon BP per cardiology  Monitor blood pressure  -ARB resumed on 12/16/22 for control

## 2022-12-16 NOTE — HOSPITAL COURSE
Pt admitted to Observation for Syncope in the setting Symptomatic bradycardia. Cardiology consulted and B-blocker and antihypertensive regimen adjusted. Neurology consulted for eval of syncope.

## 2022-12-16 NOTE — PLAN OF CARE
Problem: Adult Inpatient Plan of Care  Goal: Absence of Hospital-Acquired Illness or Injury  Outcome: Ongoing, Progressing     Problem: Adult Inpatient Plan of Care  Goal: Optimal Comfort and Wellbeing  Outcome: Ongoing, Progressing     Problem: Diabetes Comorbidity  Goal: Blood Glucose Level Within Targeted Range  Outcome: Ongoing, Progressing     Problem: Skin Injury Risk Increased  Goal: Skin Health and Integrity  Outcome: Ongoing, Progressing

## 2022-12-16 NOTE — ASSESSMENT & PLAN NOTE
Patient's FSGs are controlled with diet.  Last A1c reviewed-   Lab Results   Component Value Date    HGBA1C 5.3 12/15/2022     Most recent fingerstick glucose reviewed-100   Current correctional scale  Low  Maintain anti-hyperglycemic dose as follows-   Antihyperglycemics (From admission, onward)    Start     Stop Route Frequency Ordered    12/15/22 1632  insulin aspart U-100 pen 0-5 Units         -- SubQ Before meals & nightly PRN 12/15/22 1535        Hold Oral hypoglycemics while patient is in the hospital.

## 2022-12-16 NOTE — ASSESSMENT & PLAN NOTE
-BP labile  -May be contributing to symptoms  -Monitor trend  -BB held due to bradycardia  -Orthostatic vitals pending  -Can continue ACEi as tolerated  -Would like benefit from amlodipine vs Doxazosin pending BP trend    12/16/22  -BP still fluctuating  -BB and ARB resumed  -Monitor trend

## 2022-12-16 NOTE — ASSESSMENT & PLAN NOTE
-Presents s/p syncopal episode that occurred at rest while eating breakfast  -Suspect arrhythmia mediated, HR in 40's upon arrival  -EKG without evidence of high grade AV block  -Troponin negative  -Hold BB for now  -Monitor overnight  -Check orthostatics as labile BP may be contributing to presentation    12/16/22  -Stable  -Monitor BP and HR trend s/p med adjustments  -OP extended monitor

## 2022-12-16 NOTE — PROGRESS NOTES
"[facility] ([facility])  Cardiology  Progress Note    Patient Name: Leslie Wood  MRN: [id number]  Admission Date: 12/15/2022  Hospital Length of Stay: 0 days  Code Status: Full Code   Attending Physician: Rudy Nicole, *   Primary Care Physician: Angeles Carson MD  Expected Discharge Date:   Principal Problem:Syncope    Subjective:   HPI:  Ms. Wood is an 82 year old female patient whose current medical conditions include pre-DM, HTN, prior CVA, cardiomyopathy (felt to be stress-induced), and NSTEMI/chest pain s/p C which showed severe mid myocardial bridging who presented to [facility] ED today from her SNF facility due to syncope. Per patient's daughter, patient was sitting at the breakfast table when she suddenly lost consciousness. Unknown duration. Patient denied any associated chest pain, SOB, lightheadedness, dizziness, or palpitations prior to the event. Initial workup in ED revealed bradycardia (HR in 40's) and labile BP and patient was subsequently admitted for further evaluation and treatment. Cardiology consulted to assist with management. Patient seen and examined today in ED. Feels back to her baseline. No complaints. Daughter reports she suffered a severe fall in November and has been in SNF facility since that time. She reports has had several syncopal episodes, all occurring at rest/when patient is seated. Chart reviewed. Troponin x 1 negative. Repeat echo pending. EKG without evidence of heart block. Orthostatic vitals pending.        Hospital Course:   12/16/22-Patient seen and examined today, resting in bed. Feeling better. Reported some "jerking movements yesterday in bilateral legs" that have since resolved. CT of head negative. HR improved, BB resumed but at lower dose. CT of head showed NAF.          Review of Systems   Constitutional: Negative.   HENT: Negative.     Eyes: Negative.    Cardiovascular: Negative.    Respiratory: Negative.     Endocrine: Negative.  "   Hematologic/Lymphatic: Negative.    Skin: Negative.    Musculoskeletal:  Positive for arthritis and joint pain.   Gastrointestinal: Negative.    Genitourinary: Negative.    Neurological: Negative.    Psychiatric/Behavioral: Negative.     Allergic/Immunologic: Negative.    Objective:     Vital Signs (Most Recent):  Temp: 98.6 °F (37 °C) (12/16/22 1131)  Pulse: 77 (12/16/22 1327)  Resp: 18 (12/16/22 1131)  BP: (!) 182/77 (12/16/22 1131)  SpO2: (!) 94 % (12/16/22 1131)   Vital Signs (24h Range):  Temp:  [97.9 °F (36.6 °C)-98.6 °F (37 °C)] 98.6 °F (37 °C)  Pulse:  [59-77] 77  Resp:  [16-18] 18  SpO2:  [93 %-95 %] 94 %  BP: (132-185)/(62-78) 182/77     Weight: 61.7 kg (136 lb)  Body mass index is 25.7 kg/m².     SpO2: (!) 94 %         Intake/Output Summary (Last 24 hours) at 12/16/2022 1641  Last data filed at 12/15/2022 2000  Gross per 24 hour   Intake 540 ml   Output 900 ml   Net -360 ml       Lines/Drains/Airways       Drain  Duration             Female External Urinary Catheter 12/15/22 1400 1 day              Peripheral Intravenous Line  Duration                  Peripheral IV - Single Lumen Anterior;Proximal;Right Forearm -- days                    Physical Exam  Vitals and nursing note reviewed.   Constitutional:       General: She is not in acute distress.     Appearance: Normal appearance. She is well-developed. She is not diaphoretic.   HENT:      Head: Normocephalic and atraumatic.   Eyes:      General:         Right eye: No discharge.         Left eye: No discharge.      Pupils: Pupils are equal, round, and reactive to light.   Neck:      Thyroid: No thyromegaly.      Vascular: No JVD.      Trachea: No tracheal deviation.   Cardiovascular:      Rate and Rhythm: Normal rate and regular rhythm.      Heart sounds: Normal heart sounds, S1 normal and S2 normal. No murmur heard.  Pulmonary:      Effort: Pulmonary effort is normal. No respiratory distress.      Breath sounds: Normal breath sounds. No wheezing or  rales.   Abdominal:      General: There is no distension.      Palpations: Abdomen is soft.      Tenderness: There is no rebound.   Musculoskeletal:      Cervical back: Neck supple.      Right lower leg: No edema.      Left lower leg: No edema.   Skin:     General: Skin is warm and dry.      Findings: No erythema.   Neurological:      General: No focal deficit present.      Mental Status: She is alert and oriented to person, place, and time.   Psychiatric:         Mood and Affect: Mood normal.         Behavior: Behavior normal.         Thought Content: Thought content normal.       Significant Labs: CMP   Recent Labs   Lab 12/15/22  1128      K 4.0      CO2 29      BUN 18   CREATININE 0.7   CALCIUM 9.7   PROT 6.9   ALBUMIN 3.5   BILITOT 1.0   ALKPHOS 80   AST 21   ALT 15   ANIONGAP 11   , CBC   Recent Labs   Lab 12/15/22  1128   WBC 8.08   HGB 13.4   HCT 41.6      , Troponin   Recent Labs   Lab 12/15/22  1128   TROPONINI 0.021   , and All pertinent lab results from the last 24 hours have been reviewed.    Significant Imaging: Echocardiogram: Transthoracic echo (TTE) complete ([facility] Only):   Results for orders placed or performed during the hospital encounter of 12/15/22   Echo   Result Value Ref Range    BSA 1.63 m2    LA WIDTH 3.20 cm    IVC diameter 1.31 cm    Left Ventricular Outflow Tract Mean Velocity 0.74 cm/s    Left Ventricular Outflow Tract Mean Gradient 2.35 mmHg    LVIDd 3.59 3.5 - 6.0 cm    IVS 1.08 0.6 - 1.1 cm    Posterior Wall 1.02 0.6 - 1.1 cm    LVIDs 2.49 2.1 - 4.0 cm    FS 31 28 - 44 %    STJ 2.97 cm    Ascending aorta 3.10 cm    LV mass 115.38 g    LA size 3.31 cm    RVDD 3.34 cm    TAPSE 2.10 cm    Left Ventricle Relative Wall Thickness 0.57 cm    AV regurgitation pressure 1/2 time [id number] ms    AV mean gradient 5 mmHg    AV valve area 1.85 cm2    AV Velocity Ratio 0.64     AV index (prosthetic) 0.75     MV valve area p 1/2 method 3.24 cm2    E/A ratio 0.99      E wave deceleration time 234.23 msec    IVRT 99.90 msec    LVOT diameter 1.77 cm    LVOT area 2.5 cm2    LVOT peak mehrdad 0.91 m/s    LVOT peak VTI 31.80 cm    Ao peak mehrdad 1.42 m/s    Ao VTI 42.3 cm    RVOT peak mehrdad 0.65 m/s    RVOT peak VTI 21.5 cm    LVOT stroke volume 78.21 cm3    AV peak gradient 8 mmHg    PV mean gradient 1.17 mmHg    MV Peak E Mehrdad 0.77 m/s    AR Max Mehrdad 4.54 m/s    TR Max Mehrdad 3.50 m/s    MV stenosis pressure 1/2 time 67.93 ms    MV Peak A Mehrdad 0.78 m/s    LV Systolic Volume 22.07 mL    LV Systolic Volume Index 13.8 mL/m2    LV Diastolic Volume 54.01 mL    LV Diastolic Volume Index 33.76 mL/m2    LV Mass Index 72 g/m2    RA Major Axis 3.98 cm    Left Atrium Major Axis 4.50 cm    Triscuspid Valve Regurgitation Peak Gradient 49 mmHg    RA Width 2.50 cm    Right Atrial Pressure (from IVC) 3 mmHg    EF 60 %    TV rest pulmonary artery pressure 52 mmHg    Narrative    · The left ventricle is normal in size with concentric remodeling and   normal systolic function.  · The estimated ejection fraction is 60%.  · Normal left ventricular diastolic function.  · Normal right ventricular size with normal right ventricular systolic   function.  · Mild aortic regurgitation.  · There is moderate pulmonary hypertension.  · Normal central venous pressure (3 mmHg).  · The estimated PA systolic pressure is 52 mmHg.      , EKG: Reviewed, and X-Ray: CXR: X-Ray Chest 1 View (CXR): No results found for this visit on 12/15/22. and X-Ray Chest PA and Lateral (CXR): No results found for this visit on 12/15/22.    Assessment and Plan:   Patient who presents s/p syncopal episode. Bradycardia/fluctuating BP likely contributing factors. Meds adjusted, assess response. Needs OP extended monitor.    * Syncope  -Presents s/p syncopal episode that occurred at rest while eating breakfast  -Suspect arrhythmia mediated, HR in 40's upon arrival  -EKG without evidence of high grade AV block  -Troponin negative  -Hold BB for  now  -Monitor overnight  -Check orthostatics as labile BP may be contributing to presentation    12/16/22  -Stable  -Monitor BP and HR trend s/p med adjustments  -OP extended monitor    Bradycardia  -HR improved  -BB resumed but at lower dosage  -Monitor  -Will need OP extended monitor    Stress-induced cardiomyopathy  -EF previously 30-40% during prior admission  -Repeat echo pending  -BB held due to bradycardia  -Continue ACEi as tolerated    12/16/22  -EF recovered by recent echo    Type 2 diabetes mellitus without complication, without long-term current use of insulin  -Mgmt as per hospital medicine    Essential hypertension  -BP labile  -May be contributing to symptoms  -Monitor trend  -BB held due to bradycardia  -Orthostatic vitals pending  -Can continue ACEi as tolerated  -Would like benefit from amlodipine vs Doxazosin pending BP trend    12/16/22  -BP still fluctuating  -BB and ARB resumed  -Monitor trend        VTE Risk Mitigation (From admission, onward)         Ordered     heparin (porcine) injection 5,000 Units  Every 8 hours         12/15/22 1541     Place sequential compression device  Until discontinued         12/15/22 1867                Meka Franklin PA-C  Cardiology  [facility] ([facility])

## 2022-12-16 NOTE — PROGRESS NOTES
"Pt admitted from ED this evening, hypertensive with 's despite Hydralazine given in ED. Discussed with MD and new orders for second dose of Hydralazine received. SBP improved to 140. Pt began with restless movements in bed to BLE. C/O worsening HA and "strange feeling" on R side of face. No unilateral deficit noted. No sensation deference rpted to touch and neuro exam otherwise WNL. MD updated and pt given Ibuprofen PO with moderate relief rpted in HA. Chart check completed and POC reviewed with pt and daughter at BS.  "

## 2022-12-17 LAB
AMMONIA PLAS-SCNC: 27 UMOL/L (ref 10–50)
CORTIS SERPL-MCNC: 11.4 UG/DL
POCT GLUCOSE: 103 MG/DL (ref 70–110)
POCT GLUCOSE: 109 MG/DL (ref 70–110)
POCT GLUCOSE: 120 MG/DL (ref 70–110)
POCT GLUCOSE: 126 MG/DL (ref 70–110)
TSH SERPL DL<=0.005 MIU/L-ACNC: 1.14 UIU/ML (ref 0.4–4)

## 2022-12-17 PROCEDURE — 82533 TOTAL CORTISOL: CPT | Performed by: NURSE PRACTITIONER

## 2022-12-17 PROCEDURE — 92610 EVALUATE SWALLOWING FUNCTION: CPT

## 2022-12-17 PROCEDURE — 82140 ASSAY OF AMMONIA: CPT | Performed by: NURSE PRACTITIONER

## 2022-12-17 PROCEDURE — 84443 ASSAY THYROID STIM HORMONE: CPT | Performed by: NURSE PRACTITIONER

## 2022-12-17 PROCEDURE — 36415 COLL VENOUS BLD VENIPUNCTURE: CPT | Performed by: NURSE PRACTITIONER

## 2022-12-17 PROCEDURE — 97166 OT EVAL MOD COMPLEX 45 MIN: CPT

## 2022-12-17 PROCEDURE — 97162 PT EVAL MOD COMPLEX 30 MIN: CPT

## 2022-12-17 PROCEDURE — 92523 SPEECH SOUND LANG COMPREHEN: CPT

## 2022-12-17 PROCEDURE — 63600175 PHARM REV CODE 636 W HCPCS: Performed by: INTERNAL MEDICINE

## 2022-12-17 PROCEDURE — 99232 SBSQ HOSP IP/OBS MODERATE 35: CPT | Mod: ,,, | Performed by: INTERNAL MEDICINE

## 2022-12-17 PROCEDURE — 97116 GAIT TRAINING THERAPY: CPT

## 2022-12-17 PROCEDURE — 25000003 PHARM REV CODE 250: Performed by: NURSE PRACTITIONER

## 2022-12-17 PROCEDURE — 21400001 HC TELEMETRY ROOM

## 2022-12-17 PROCEDURE — 99232 PR SUBSEQUENT HOSPITAL CARE,LEVL II: ICD-10-PCS | Mod: ,,, | Performed by: INTERNAL MEDICINE

## 2022-12-17 PROCEDURE — 97530 THERAPEUTIC ACTIVITIES: CPT

## 2022-12-17 RX ADMIN — METOPROLOL SUCCINATE 25 MG: 25 TABLET, FILM COATED, EXTENDED RELEASE ORAL at 08:12

## 2022-12-17 RX ADMIN — HEPARIN SODIUM 5000 UNITS: 5000 INJECTION INTRAVENOUS; SUBCUTANEOUS at 02:12

## 2022-12-17 RX ADMIN — HEPARIN SODIUM 5000 UNITS: 5000 INJECTION INTRAVENOUS; SUBCUTANEOUS at 09:12

## 2022-12-17 RX ADMIN — LOSARTAN POTASSIUM 50 MG: 50 TABLET, FILM COATED ORAL at 08:12

## 2022-12-17 RX ADMIN — HEPARIN SODIUM 5000 UNITS: 5000 INJECTION INTRAVENOUS; SUBCUTANEOUS at 05:12

## 2022-12-17 NOTE — PT/OT/SLP EVAL
"Occupational Therapy Evaluation and Treatment    Name: Leslie Wood  MRN: [id number]  Admitting Diagnosis: Syncope  Recent Surgery: * No surgery found *      Recommendations:     Discharge Recommendations: nursing facility, skilled  Level of Assistance Recommended: 24 hours light assistance  Discharge Equipment Recommendations: none  Barriers to discharge: None    Assessment:     Leslie Wood is a 82 y.o. female with a medical diagnosis of Syncope. She presents with performance deficits affecting function including weakness, impaired endurance, impaired self care skills, impaired functional mobility, impaired balance, impaired cardiopulmonary response to activity, decreased safety awareness, impaired skin.    Rehab Prognosis: Good; patient would benefit from acute skilled OT services to address these deficits and reach maximum level of function.    Plan:     Patient to be seen 2 x/week to address the above listed problems via self-care/home management, therapeutic exercises, therapeutic activities  Plan of Care Expires: 12/31/22  Plan of Care Reviewed with: patient, daughter    Subjective     Chief Complaint: Reported "I was doing so well with therapy."  Patient Comments/Goals: get stronger to return home  Pain/Comfort:  Pain Rating 1: 0/10    Social History:  Patient to acute from SNF. Reports ambulating with A and RW in hallways and increasing engagement in ADLs. Below information from initial admission.  Living Environment: Patient lives with their daughter and granddaughter in a single story home, number of outside stairs: threshold .  Prior Level of Function: Prior to admission, patient was mod I with ADLs and household distance ambulation via furniture cruising.  Roles and Routines: Patient did not drive and is retired.  Equipment Used at Home: bedside commode, walker, rolling, cane, quad, wheelchair, shower chair, grab bar  DME owned (not currently used): none  Assistance Upon Discharge: " family.    Objective:     Communicated with nurse, Marilou VELARDE, prior to session. Patient found supine with peripheral IV, telemetry, PureWick upon OT entry to room.    General Precautions: Standard, fall   Orthopedic Precautions:RLE weight bearing as tolerated   Braces: N/A (using R knee immobilizer at SNF PRN for pain control)  Respiratory Status: Room air    Bed Mobility:  Supine to sit from left side of bed with minimum assistance    Functional Mobility/Transfers:  Sit <> Stand Transfer with minimum assistance with rolling walker  Bed <> Chair Transfer using Step Transfer technique with minimum assistance with rolling walker  Functional Mobility: Patient completed x50ft x2 functional mobility with RW and min A to increase dynamic standing balance and activity tolerance needed for ADL completion.    Activities of Daily Living:  Grooming: set up assistance .  Upper Body Dressing: minimum assistance .    Cognitive/Visual Perceptual:  Cognitive/Psychosocial Skills:     -       Oriented to: Person, Place, Time, and Situation   -       Follows Commands/attention:Follows two-step commands    Physical Exam:  Balance:    -       sitting: good, static standing: fair, dynamic standing: poor +  Upper Extremity Range of Motion:     -       Right Upper Extremity: WFL  -       Left Upper Extremity: WFL  Upper Extremity Strength:    -       Right Upper Extremity: Deficits: grossly 4/5  -       Left Upper Extremity: Deficits: grossly 4/5   Strength:    -       Right Upper Extremity: Deficits: fair  -       Left Upper Extremity: Deficits: fair    AMPAC 6 Click ADL:  AMPAC Total Score: 17    Treatment & Education:  Therapist provided facilitation and instruction of proper body mechanics, energy conservation, and fall prevention strategies during tasks listed above.  Patient educated on role of OT, POC and goals for therapy  Patient educated on importance of OOB activities with staff member assistance and sitting OOB majority of  the day.   Educated on completion of B UE AROM therex to be completed via HEP to increase functional strength and activity tolerance needed for ADL completion.    Patient clear to ambulate to/from bathroom with RN/PCT, assist x1 .    Patient left up in chair with all lines intact, call button in reach, RN notified, and daughter present.    BP:  Sitting 164/86, standing 155/74, standing after ambulation 162/79. Negative for orthostatic hypotension this session. Asymptomatic throughout.    GOALS:   Multidisciplinary Problems       Occupational Therapy Goals          Problem: Occupational Therapy    Goal Priority Disciplines Outcome Interventions   Occupational Therapy Goal     OT, PT/OT     Description: Goals to be met by: 12/31/22     Patient will increase functional independence with ADLs by performing:    Toileting from toilet with Supervision for hygiene and clothing management.   Toilet transfer to toilet with Supervision.  Increased functional strength in B UE grossly by 1/2 MM grade.                         History:     Past Medical History:   Diagnosis Date    Arthritis     Cataract     GERD (gastroesophageal reflux disease)     Heart attack 05/18/2022    Heart attack 05/23/2022    Hyperlipidemia     Hypertension     Stroke          Past Surgical History:   Procedure Laterality Date    ADENOIDECTOMY      ADRENAL GLAND SURGERY      APPENDECTOMY      BACK SURGERY      fusion l 4-5 s 1,2,3  fusion l 2-3    CORONARY ANGIOGRAPHY N/A 5/20/2022    Procedure: ANGIOGRAM, CORONARY ARTERY;  Surgeon: Domingo Martins MD;  Location: [facility] CATH LAB;  Service: Cardiology;  Laterality: N/A;    CORONARY ANGIOGRAPHY N/A 5/24/2022    Procedure: ANGIOGRAM, CORONARY ARTERY;  Surgeon: Domingo Martins MD;  Location: [facility] CATH LAB;  Service: Cardiology;  Laterality: N/A;    EYE SURGERY      HEMORRHOID SURGERY      HERNIA REPAIR      HYSTERECTOMY      indirect lumbar decompression      percutaneous placement of interspinous  extension blocker    LEFT HEART CATHETERIZATION Left 5/20/2022    Procedure: CATHETERIZATION, HEART, LEFT;  Surgeon: Domingo Martins MD;  Location: [facility] CATH LAB;  Service: Cardiology;  Laterality: Left;    TONSILLECTOMY         Time Tracking:     OT Date of Treatment: 12/17/22  OT Start Time: 0905  OT Stop Time: 0930  OT Total Time (min): 25 min    Billable Minutes: Evaluation 15 and Therapeutic Activity 10    12/17/2022

## 2022-12-17 NOTE — PROGRESS NOTES
"[facility] ([facility])  Cardiology  Progress Note    Patient Name: Leslie Wood  MRN: [id number]  Admission Date: 12/15/2022  Hospital Length of Stay: 1 days  Code Status: Full Code   Attending Physician: Rudy Nicole, *   Primary Care Physician: Angeles Carson MD  Expected Discharge Date:   Principal Problem:Syncope    Subjective:     Hospital Course:   12/16/22-Patient seen and examined today, resting in bed. Feeling better. Reported some "jerking movements yesterday in bilateral legs" that have since resolved. CT of head negative. HR improved, BB resumed but at lower dose. CT of head showed NAF.    12/17/2022  STATUS QUO BP SEEMS LABILE WAS BETETR CONTROLLED YESTERDAY. WILL CONTINUE SAME MEDS.         Interval History: NOCARDIAC COMPLAINTS    Review of Systems   Constitutional: Negative for diaphoresis, malaise/fatigue and weight gain.   HENT:  Negative for hoarse voice.    Eyes:  Negative for double vision and visual disturbance.   Cardiovascular:  Negative for chest pain, claudication, cyanosis, dyspnea on exertion, irregular heartbeat, leg swelling, near-syncope, orthopnea, palpitations, paroxysmal nocturnal dyspnea and syncope.   Respiratory:  Negative for cough, hemoptysis, shortness of breath and snoring.    Hematologic/Lymphatic: Negative for bleeding problem. Does not bruise/bleed easily.   Skin:  Negative for color change and poor wound healing.   Musculoskeletal:  Negative for muscle cramps, muscle weakness and myalgias.   Gastrointestinal:  Negative for bloating, abdominal pain, change in bowel habit, diarrhea, heartburn, hematemesis, hematochezia, melena and nausea.   Neurological:  Negative for excessive daytime sleepiness, dizziness, headaches, light-headedness, loss of balance, numbness and weakness.   Psychiatric/Behavioral:  Negative for memory loss. The patient does not have insomnia.    Allergic/Immunologic: Negative for hives.   Objective:     Vital Signs (Most Recent):  Temp: " 98.5 °F (36.9 °C) (12/17/22 1141)  Pulse: 60 (12/17/22 1141)  Resp: 20 (12/17/22 1141)  BP: (!) 161/91 (12/17/22 1141)  SpO2: 96 % (12/17/22 1141)   Vital Signs (24h Range):  Temp:  [96.8 °F (36 °C)-98.5 °F (36.9 °C)] 98.5 °F (36.9 °C)  Pulse:  [60-83] 60  Resp:  [14-20] 20  SpO2:  [93 %-96 %] 96 %  BP: (131-161)/(60-91) 161/91     Weight: 62.5 kg (137 lb 12.6 oz)  Body mass index is 26.03 kg/m².     SpO2: 96 %         Intake/Output Summary (Last 24 hours) at 12/17/2022 1403  Last data filed at 12/17/2022 0400  Gross per 24 hour   Intake --   Output 750 ml   Net -750 ml       Lines/Drains/Airways       Drain  Duration             Female External Urinary Catheter 12/15/22 1400 2 days              Peripheral Intravenous Line  Duration                  Peripheral IV - Single Lumen Anterior;Proximal;Right Forearm -- days                    Physical Exam  Constitutional:       General: She is not in acute distress.     Appearance: Normal appearance. She is well-developed. She is not ill-appearing.   HENT:      Head: Normocephalic and atraumatic.   Eyes:      General: No scleral icterus.     Pupils: Pupils are equal, round, and reactive to light.   Neck:      Thyroid: No thyromegaly.      Vascular: Normal carotid pulses. No carotid bruit, hepatojugular reflux or JVD.      Trachea: No tracheal deviation.   Cardiovascular:      Rate and Rhythm: Normal rate and regular rhythm.      Pulses: Normal pulses.      Heart sounds: Normal heart sounds. No murmur heard.    No friction rub. No gallop.   Pulmonary:      Effort: Pulmonary effort is normal. No respiratory distress.      Breath sounds: Normal breath sounds. No wheezing, rhonchi or rales.   Chest:      Chest wall: No tenderness.   Abdominal:      General: Bowel sounds are normal. There is no abdominal bruit.      Palpations: Abdomen is soft. There is no hepatomegaly or pulsatile mass.      Tenderness: There is no abdominal tenderness.   Musculoskeletal:      Right shoulder:  No deformity.      Cervical back: Normal range of motion and neck supple.      Right lower leg: No edema.      Left lower leg: No edema.   Skin:     General: Skin is warm and dry.      Findings: No erythema or rash.      Nails: There is no clubbing.   Neurological:      Mental Status: She is alert and oriented to person, place, and time.      Cranial Nerves: No cranial nerve deficit.      Coordination: Coordination normal.   Psychiatric:         Speech: Speech normal.         Behavior: Behavior normal.         Judgment: Judgment normal.       Significant Labs:   Recent Lab Results         12/17/22  1141   12/17/22  0606   12/17/22  0504   12/16/22  2038        Ammonia     27         POCT Glucose 126   103     119       TSH     1.137                 Significant Imaging: X-Ray: CXR: X-Ray Chest 1 View (CXR): No results found for this visit on 12/15/22.    Assessment and Plan:     Brief HPI: A 83 YO FEMALE CONTINUES TO TOLERATE LOW DOSE B BLCOKERS WITH FLUCTUATING BLOOD PRESSURE. WILL CONTINUE MONITORING. NEEDS OUTPATIENT HOLTER AND MONITORING    * Syncope  -Presents s/p syncopal episode that occurred at rest while eating breakfast  -Suspect arrhythmia mediated, HR in 40's upon arrival  -EKG without evidence of high grade AV block  -Troponin negative  -Hold BB for now  -Monitor overnight  -Check orthostatics as labile BP may be contributing to presentation    12/16/22  -Stable  -Monitor BP and HR trend s/p med adjustments  -OP extended monitor    12/17/2022  TOLERATED LOW DOSE B BLOCKERS.    Bradycardia  -HR improved  -BB resumed but at lower dosage  -Monitor  -Will need OP extended monitor    Stress-induced cardiomyopathy  -EF previously 30-40% during prior admission  -Repeat echo pending  -BB held due to bradycardia  -Continue ACEi as tolerated    12/16/22  -EF recovered by recent echo    Type 2 diabetes mellitus without complication, without long-term current use of insulin  -Mgmt as per hospital  medicine    Essential hypertension  -BP labile  -May be contributing to symptoms  -Monitor trend  -BB held due to bradycardia  -Orthostatic vitals pending  -Can continue ACEi as tolerated  -Would like benefit from amlodipine vs Doxazosin pending BP trend    12/16/22  -BP still fluctuating  -BB and ARB resumed  -Monitor trend    12/17/2022  MONITOR TREND. BP HIGHER TODAY.        VTE Risk Mitigation (From admission, onward)         Ordered     heparin (porcine) injection 5,000 Units  Every 8 hours         12/15/22 1549     Place sequential compression device  Until discontinued         12/15/22 1546                Memo Granados MD  Cardiology  [facility] Tele[facility] ([facility])

## 2022-12-17 NOTE — PROGRESS NOTES
[facility] ([facility] Medicine  Progress Note    Patient Name: Leslie Wood  MRN: [id number]  Patient Class: IP- Inpatient   Admission Date: 12/15/2022  Length of Stay: 1 days  Attending Physician: Rudy Nicole, *  Primary Care Provider: Angeles Carson MD        Subjective:     Principal Problem:Syncope        HPI:  The patient is 83 yo female with past medical history of arthritis, GERD, CAD, MI, hypertension, COVID, cardiomyopathy and stroke who presented to the ED with multiple syncopal episodes. The patient is at [place] for skilled care due to a significant fall in November. She has had 2 episodes  this week. This morning she was sitting at the table when she  suddenly lost consciousness. She denied chest pain, SOB, lightheadedness, or palpitations. She was noted to have heart rate in the 40s. She was orthostatic by blood pressure. Cardiology was consulted as her medications were recently adjusted. Patient feels she is at her baseline. Her syncopal episodes have all occurred at  rest while seated. Echo pending. Hospital medicine consulted for continuation of care. Patient placed in observation.      Overview/Hospital Course:  Pt admitted to Observation for Syncope in the setting Symptomatic bradycardia. Cardiology consulted and beta blocker held. Echo showed concentric remodeling and normal systolic function. EF 60%. Mild aortic regurgitation and moderate pulmonary hypertension. Neurology consulted with seizures considered and EEG ordered. CT of head pending in the setting of increased confusion post fall and over the last month . Infectious process ruled out with UA and chest xray unremarkable. HR normalized with case discussed with Cardiology and Lopressor resumed at decreased dose. Losartan resumed for BP control.     12/17:  Patient alert and oriented, appeared more awake, denied acute issues overnight.    Pt oriented to person, place and birth date- as per family pt at  baseline mentation  Able to tolerate p.o. diet, hemodynamically stable.    Ammonia within normal limits, TSH within normal limits.  Cardio on board, resumed low-dose beta-blocker, ARB;   Syncope likely secondary to orthostatic hypotension, would recommend compression stockings upon discharge   CT head negative, will follow up on PT OT.          Interval History: NAEON    Review of Systems    Constitutional:  Negative for chills and fever.   HENT:  Negative for congestion, facial swelling, sinus pressure and trouble swallowing.    Respiratory:  Negative for cough, shortness of breath and wheezing.    Cardiovascular:  Negative for chest pain and palpitations.   Gastrointestinal:  Negative for abdominal distention, abdominal pain, nausea and vomiting.   Genitourinary:  Negative for difficulty urinating, dysuria, flank pain, frequency and urgency.   Musculoskeletal:  Negative for arthralgias, back pain and myalgias.   Skin:  Negative for color change and wound.   Neurological: denied headache, dizziness  Psychiatric/Behavioral:  Negative for sleep disturbance. The patient is not nervous/anxious.    All other systems reviewed and are negative.      Objective:     Vital Signs (Most Recent):  Temp: 98.1 °F (36.7 °C) (12/17/22 0741)  Pulse: 69 (12/17/22 0741)  Resp: 20 (12/17/22 0741)  BP: (!) 154/70 (12/17/22 0741)  SpO2: 96 % (12/17/22 0741)   Vital Signs (24h Range):  Temp:  [96.8 °F (36 °C)-98.6 °F (37 °C)] 98.1 °F (36.7 °C)  Pulse:  [59-83] 69  Resp:  [14-20] 20  SpO2:  [93 %-96 %] 96 %  BP: (131-182)/(60-77) 154/70     Weight: 62.5 kg (137 lb 12.6 oz)  Body mass index is 26.03 kg/m².    Intake/Output Summary (Last 24 hours) at 12/17/2022 1103  Last data filed at 12/17/2022 0400  Gross per 24 hour   Intake --   Output 750 ml   Net -750 ml      Physical Exam    HENT:      Head: Normocephalic and atraumatic.   Cardiovascular:      Rate and Rhythm: Regular rhythm.      Heart sounds: No murmur heard.  Pulmonary:       Effort: Pulmonary effort is normal. No respiratory distress.      Breath sounds: Normal breath sounds. No wheezing.   Abdominal:      General: Bowel sounds are normal. There is no distension.      Palpations: Abdomen is soft.      Tenderness: There is no abdominal tenderness.   Musculoskeletal:         General: No swelling.   Skin:     General: Skin is warm and dry.   Neurological:      Mental Status: She is alert. Mental status is at baseline.      Comments: Pt oriented to person, place and birth date- as per family pt at baseline mentation  Psychiatric:         Attention and Perception: Attention normal.         Speech: Speech normal.          Significant Labs: All pertinent labs within the past 24 hours have been reviewed.  CBC:   Recent Labs   Lab 12/15/22  1128   WBC 8.08   HGB 13.4   HCT 41.6        CMP:   Recent Labs   Lab 12/15/22  1128      K 4.0      CO2 29      BUN 18   CREATININE 0.7   CALCIUM 9.7   PROT 6.9   ALBUMIN 3.5   BILITOT 1.0   ALKPHOS 80   AST 21   ALT 15   ANIONGAP 11       Significant Imaging:     Imaging Results              X-Ray Chest AP Portable (Final result)  Result time 12/15/22 11:00:51      Final result by Fernando Royal MD (12/15/22 11:00:51)                   Impression:      No acute findings.      Electronically signed by: Fernando Royal MD  Date:    12/15/2022  Time:    11:00               Narrative:    EXAMINATION:  XR CHEST AP PORTABLE    CLINICAL HISTORY:  Chest Pain;    TECHNIQUE:  Single frontal view of the chest was performed.    COMPARISON:  11/14/2022    FINDINGS:  The cardiomediastinal silhouette is normal.  Aortic atherosclerosis.    The lungs are clear.  No pleural effusions.    No acute osseous findings.  Spinal stimulator in the thoracic canal.  Surgical clips left upper quadrant.                                         Assessment/Plan:      * Syncope  Likely multifactorial  Hold beta blocker  Continue IVFs  Repeat orthostatic vitals in  am  Echo results results reviewed   -Seizure not excluded with EEG per Neurology   Await further  -CT of head ordered due to increased confusion over the last month reported   -PT/OT       Disorientation        Bradycardia  Continue telemetry monitoring  - HR normalized  -cardiology following   -Lopressor resumed at decreased dose per cardiology - Observation continued       Stress-induced cardiomyopathy  Per cardiology  EF 30-40% last admission  Repeat echo showed concentric remodeling and normal systolic function.  The estimated ejection fraction is 60%.  Normal left ventricular diastolic function.  Normal right ventricular size with normal right ventricular systolic function.  Mild aortic regurgitation.  There is moderate pulmonary hypertension.      Altered mental state  -confusion noted upon exam   -increased episodes of confusion reported per daughter at bedside   -CT of head results pending    -UA and chest xray negative for infectious process   -Syncope work up in progress  -neuro checks   -Neurology consulted- EEG pending     Type 2 diabetes mellitus without complication, without long-term current use of insulin  Patient's FSGs are controlled with diet.  Last A1c reviewed-   Lab Results   Component Value Date    HGBA1C 5.3 12/15/2022     Most recent fingerstick glucose reviewed-100   Current correctional scale  Low  Maintain anti-hyperglycemic dose as follows-   Antihyperglycemics (From admission, onward)      Start     Stop Route Frequency Ordered    12/15/22 1632  insulin aspart U-100 pen 0-5 Units         -- SubQ Before meals & nightly PRN 12/15/22 1535          Hold Oral hypoglycemics while patient is in the hospital.    Essential hypertension  Hold beta blocker due to bradycardia  -hx of labile BP noted    IV hydralazine with parameters   Possible doxazosin based upon BP per cardiology  Monitor blood pressure  -ARB resumed on 12/16/22 for control         VTE Risk Mitigation (From admission, onward)            Ordered     heparin (porcine) injection 5,000 Units  Every 8 hours         12/15/22 1549     Place sequential compression device  Until discontinued         12/15/22 1546                    Discharge Planning   JOSE:      Code Status: Full Code   Is the patient medically ready for discharge?:     Reason for patient still in hospital (select all that apply): pending EEG, PT/ OT  Discharge Plan A: Skilled Nursing Facility                  Rudy Nicole MD  Department of Hospital Medicine   [facility] ([facility])

## 2022-12-17 NOTE — PT/OT/SLP EVAL
Speech Language Pathology Evaluation  Bedside Swallow    Patient Name:  Leslie Wood   MRN:  [id number]  Admitting Diagnosis: Syncope    Recommendations:                 General Recommendations:  Cognitive-linguistic therapy  Diet recommendations:  Regular Diet - IDDSI Level 7, Thin Liquids   Aspiration Precautions: Standard aspiration precautions   General Precautions: Standard,    Communication strategies:  provide increased time to answer and go to room if call light pushed    History:     Past Medical History:   Diagnosis Date    Arthritis     Cataract     GERD (gastroesophageal reflux disease)     Heart attack 05/18/2022    Heart attack 05/23/2022    Hyperlipidemia     Hypertension     Stroke        Past Surgical History:   Procedure Laterality Date    ADENOIDECTOMY      ADRENAL GLAND SURGERY      APPENDECTOMY      BACK SURGERY      fusion l 4-5 s 1,2,3  fusion l 2-3    CORONARY ANGIOGRAPHY N/A 5/20/2022    Procedure: ANGIOGRAM, CORONARY ARTERY;  Surgeon: Domingo Martins MD;  Location: [facility] CATH LAB;  Service: Cardiology;  Laterality: N/A;    CORONARY ANGIOGRAPHY N/A 5/24/2022    Procedure: ANGIOGRAM, CORONARY ARTERY;  Surgeon: Domingo Martins MD;  Location: [facility] CATH LAB;  Service: Cardiology;  Laterality: N/A;    EYE SURGERY      HEMORRHOID SURGERY      HERNIA REPAIR      HYSTERECTOMY      indirect lumbar decompression      percutaneous placement of interspinous extension blocker    LEFT HEART CATHETERIZATION Left 5/20/2022    Procedure: CATHETERIZATION, HEART, LEFT;  Surgeon: Domingo Martins MD;  Location: [facility] CATH LAB;  Service: Cardiology;  Laterality: Left;    TONSILLECTOMY         Social History: Patient lives with her family and prior to admit was in a SNF unit.    Prior Intubation HX:  none    Modified Barium Swallow: none reported     Chest X-Rays: clear lungs reported    Prior diet: Regular consistency; thin liquids.Pt and family report pt has had a few esophageal dilations in past with last  one about 5 years ago.    Occupation/hobbies/homemaking: n/a.    Subjective     P[t sitting up in chair with no complaints   Patient goals: to get home soon       Pain/Comfort:  Pain Rating 1: 0/10    Respiratory Status: Room air    Objective:     Oral Musculature Evaluation  Oral Musculature: WFL  Dentition: present and adequate  Mucosal Quality: good  Mandibular Strength and Mobility: WFL  Oral Labial Strength and Mobility: WFL  Lingual Strength and Mobility: WFL  Velar Elevation: WFL    Bedside Swallow Eval:   Consistencies Assessed:  Thin liquids No difficulties noted   Puree No difficulties noted   Solids No difficulties noted       Oral Phase:   WFL    Pharyngeal Phase:   no overt clinical signs/symptoms of aspiration  no overt clinical signs/symptoms of pharyngeal dysphagia    Compensatory Strategies  None    Treatment: Pt followed simple commands and answered simple questions without difficulties.  She was oriented to person but required minimal amount of time to answer location and date.    Pt named simple items without difficulties but did have minimal pauses to  name items in a particular category.   Pt and family report recent decline in memory and word finding skills.     Assessment:     Leslie Wood is a 82 y.o. female with an SLP diagnosis of upon this evaluation presented with no observable signs of swallowing difficulties.  She did present with some cognitive/linguistic decline and is recommended for S.T. to address these areas.  Basic swallowing precautions reviewed with the  patient and family.      Goals:   Multidisciplinary Problems       SLP Goals          Problem: SLP    Goal Priority Disciplines Outcome   SLP Goal     SLP Ongoing, Progressing                   GOALS:  SLUMS to further assess cognition  Moderate level short term memory tasks with 90%  Moderate level word-finding tasks with 90%    Plan:     Patient to be seen:  3 x/week   Plan of Care expires:  12/24/22  Plan of Care reviewed  with:  patient, grandchild(godwin)   SLP Follow-Up:  Yes       Discharge recommendations:    Home Health S.T or SNF  Barriers to Discharge:  Level of Skilled Assistance Needed      Time Tracking:     SLP Treatment Date:   12/17/22  Speech Start Time:  1000  Speech Stop Time:  1030     Speech Total Time (min):  30 min    Billable Minutes: 30 minutes     12/17/2022

## 2022-12-17 NOTE — PLAN OF CARE
OT caroline completed. Sup>sit min A, sit>stand min A, functional mobility x50ft x2 trials with RW and min A, step>pivot to bedside chair with min A. Recommending SNF at d/c.

## 2022-12-17 NOTE — PT/OT/SLP EVAL
Physical Therapy Evaluation    Patient Name:  Leslie Wood   MRN:  [id number]    Recommendations:     Discharge Recommendations: nursing facility, skilled   Discharge Equipment Recommendations:     Barriers to discharge: None    Assessment:     Leslie Wood is a 82 y.o. female admitted with a medical diagnosis of Syncope.  She presents with the following impairments/functional limitations: weakness, impaired endurance, impaired functional mobility, gait instability, impaired balance, decreased coordination, decreased safety awareness, pain which limits safe functional mobility and increase caregiver burden.    Rehab Prognosis: Good; patient would benefit from acute skilled PT services to address these deficits and reach maximum level of function.    Recent Surgery: * No surgery found *      Plan:     During this hospitalization, patient to be seen 3 x/week to address the identified rehab impairments via gait training, therapeutic activities, therapeutic exercises, neuromuscular re-education and progress toward the following goals:    Plan of Care Expires:  12/31/22    Subjective     Chief Complaint: syncope  Patient/Family Comments/goals: no complaints and agreeable to PT services  Pain/Comfort:  Pain Rating 1: 0/10  Pain Addressed 1: Reposition, Distraction    Patients cultural, spiritual, Latter day conflicts given the current situation: no    Living Environment:  Pt lived with daughter in [facility] prior but has since been a resident at [place].  Prior to admission, patients level of function was mod I.  Equipment used at home: bedside commode, cane, straight, rollator, walker, rolling.  DME owned (not currently used): none.  Upon discharge, patient will have assistance from daughter.    Objective:     Communicated with nursing and chart review via epic prior to session.  Patient found supine with peripheral IV, telemetry  upon PT entry to room.    General Precautions: Standard, fall  Orthopedic Precautions:N/A    Braces: N/A  Respiratory Status: Room air    Exams:  Cognitive Exam:  Patient is oriented to Person, Place, Time, and Situation  RLE ROM: WFL  RLE Strength: WFL  LLE ROM: WFL  LLE Strength: WFL  General weakness    Functional Mobility:  Bed Mobility:     Supine to Sit: stand by assistance  Sit to Supine: stand by assistance  Transfers:     Sit to Stand:  stand by assistance with rolling walker  Bed to Chair: stand by assistance with  rolling walker  using  Stand Pivot  Gait: 40ft x 2 CGA with RW, demonstrates slow pace, decreased step length and foot clearance, decreased stance time to RLE with tendency to lean to L  Balance: fair dynamic standing balance      AM-PAC 6 CLICK MOBILITY  Total Score:16       Treatment & Education:  Educated pt on benefits of consistent participation in PT services to meet functional goals. Educated pt on seated therex to promote strength and joint mobility. Exercises included AP, LAQ x 10-15 reps. Educated to perform exercises intermittently throughout day to tolerance. Educated pt on importance of sitting OOB to promote endurance and overall activity tolerance. Pt expressed understanding. Educated pt on call don't fall policy and use of call button to alert nursing staff of needs.      Patient left up in chair with all lines intact, call button in reach, chair alarm on, nursing notified, and daughter present.    GOALS:   Multidisciplinary Problems       Physical Therapy Goals          Problem: Physical Therapy    Goal Priority Disciplines Outcome Goal Variances Interventions   Physical Therapy Goal     PT, PT/OT      Description: Pt will perform bed mobility independently in order to participate in EOB activity.  Pt will perform transfers independently in order to participate in OOB activity.   Pt will ambulate 150ft mod I with LRAD in order to participate in daily tasks.                         History:     Past Medical History:   Diagnosis Date    Arthritis     Cataract     GERD  (gastroesophageal reflux disease)     Heart attack 05/18/2022    Heart attack 05/23/2022    Hyperlipidemia     Hypertension     Stroke        Past Surgical History:   Procedure Laterality Date    ADENOIDECTOMY      ADRENAL GLAND SURGERY      APPENDECTOMY      BACK SURGERY      fusion l 4-5 s 1,2,3  fusion l 2-3    CORONARY ANGIOGRAPHY N/A 5/20/2022    Procedure: ANGIOGRAM, CORONARY ARTERY;  Surgeon: Domingo Martins MD;  Location: [facility] CATH LAB;  Service: Cardiology;  Laterality: N/A;    CORONARY ANGIOGRAPHY N/A 5/24/2022    Procedure: ANGIOGRAM, CORONARY ARTERY;  Surgeon: Domingo Martins MD;  Location: [facility] CATH LAB;  Service: Cardiology;  Laterality: N/A;    EYE SURGERY      HEMORRHOID SURGERY      HERNIA REPAIR      HYSTERECTOMY      indirect lumbar decompression      percutaneous placement of interspinous extension blocker    LEFT HEART CATHETERIZATION Left 5/20/2022    Procedure: CATHETERIZATION, HEART, LEFT;  Surgeon: Domingo Martins MD;  Location: [facility] CATH LAB;  Service: Cardiology;  Laterality: Left;    TONSILLECTOMY         Time Tracking:     PT Received On: 12/17/22  PT Start Time: 0900     PT Stop Time: 0930  PT Total Time (min): 30 min     Billable Minutes: Evaluation 10 and Gait Training 20 12/17/2022

## 2022-12-17 NOTE — ASSESSMENT & PLAN NOTE
-Presents s/p syncopal episode that occurred at rest while eating breakfast  -Suspect arrhythmia mediated, HR in 40's upon arrival  -EKG without evidence of high grade AV block  -Troponin negative  -Hold BB for now  -Monitor overnight  -Check orthostatics as labile BP may be contributing to presentation    12/16/22  -Stable  -Monitor BP and HR trend s/p med adjustments  -OP extended monitor    12/17/2022  TOLERATED LOW DOSE B BLOCKERS.

## 2022-12-17 NOTE — PLAN OF CARE
Swallowing/Speech Evaluation completed. Pt with no swallowing difficulties but did present with some cognitive and language difficulties that pt/family report as new/evolving over past few weeks.  Continued S.T. to address these areas is recommended.  Pt is recommended for a Regular consistency diet and thin liquids with basic swallowing precautions.

## 2022-12-17 NOTE — PLAN OF CARE
EVAL AND TX COMPLETED: facilitated bed mobility with SBA, transfers with SBA. Ambulated 40 ft x 2 with RW. Recommend SNF

## 2022-12-17 NOTE — SUBJECTIVE & OBJECTIVE
Interval History: NOCARDIAC COMPLAINTS    Review of Systems   Constitutional: Negative for diaphoresis, malaise/fatigue and weight gain.   HENT:  Negative for hoarse voice.    Eyes:  Negative for double vision and visual disturbance.   Cardiovascular:  Negative for chest pain, claudication, cyanosis, dyspnea on exertion, irregular heartbeat, leg swelling, near-syncope, orthopnea, palpitations, paroxysmal nocturnal dyspnea and syncope.   Respiratory:  Negative for cough, hemoptysis, shortness of breath and snoring.    Hematologic/Lymphatic: Negative for bleeding problem. Does not bruise/bleed easily.   Skin:  Negative for color change and poor wound healing.   Musculoskeletal:  Negative for muscle cramps, muscle weakness and myalgias.   Gastrointestinal:  Negative for bloating, abdominal pain, change in bowel habit, diarrhea, heartburn, hematemesis, hematochezia, melena and nausea.   Neurological:  Negative for excessive daytime sleepiness, dizziness, headaches, light-headedness, loss of balance, numbness and weakness.   Psychiatric/Behavioral:  Negative for memory loss. The patient does not have insomnia.    Allergic/Immunologic: Negative for hives.   Objective:     Vital Signs (Most Recent):  Temp: 98.5 °F (36.9 °C) (12/17/22 1141)  Pulse: 60 (12/17/22 1141)  Resp: 20 (12/17/22 1141)  BP: (!) 161/91 (12/17/22 1141)  SpO2: 96 % (12/17/22 1141)   Vital Signs (24h Range):  Temp:  [96.8 °F (36 °C)-98.5 °F (36.9 °C)] 98.5 °F (36.9 °C)  Pulse:  [60-83] 60  Resp:  [14-20] 20  SpO2:  [93 %-96 %] 96 %  BP: (131-161)/(60-91) 161/91     Weight: 62.5 kg (137 lb 12.6 oz)  Body mass index is 26.03 kg/m².     SpO2: 96 %         Intake/Output Summary (Last 24 hours) at 12/17/2022 1403  Last data filed at 12/17/2022 0400  Gross per 24 hour   Intake --   Output 750 ml   Net -750 ml       Lines/Drains/Airways       Drain  Duration             Female External Urinary Catheter 12/15/22 1400 2 days              Peripheral Intravenous  Line  Duration                  Peripheral IV - Single Lumen Anterior;Proximal;Right Forearm -- days                    Physical Exam  Constitutional:       General: She is not in acute distress.     Appearance: Normal appearance. She is well-developed. She is not ill-appearing.   HENT:      Head: Normocephalic and atraumatic.   Eyes:      General: No scleral icterus.     Pupils: Pupils are equal, round, and reactive to light.   Neck:      Thyroid: No thyromegaly.      Vascular: Normal carotid pulses. No carotid bruit, hepatojugular reflux or JVD.      Trachea: No tracheal deviation.   Cardiovascular:      Rate and Rhythm: Normal rate and regular rhythm.      Pulses: Normal pulses.      Heart sounds: Normal heart sounds. No murmur heard.    No friction rub. No gallop.   Pulmonary:      Effort: Pulmonary effort is normal. No respiratory distress.      Breath sounds: Normal breath sounds. No wheezing, rhonchi or rales.   Chest:      Chest wall: No tenderness.   Abdominal:      General: Bowel sounds are normal. There is no abdominal bruit.      Palpations: Abdomen is soft. There is no hepatomegaly or pulsatile mass.      Tenderness: There is no abdominal tenderness.   Musculoskeletal:      Right shoulder: No deformity.      Cervical back: Normal range of motion and neck supple.      Right lower leg: No edema.      Left lower leg: No edema.   Skin:     General: Skin is warm and dry.      Findings: No erythema or rash.      Nails: There is no clubbing.   Neurological:      Mental Status: She is alert and oriented to person, place, and time.      Cranial Nerves: No cranial nerve deficit.      Coordination: Coordination normal.   Psychiatric:         Speech: Speech normal.         Behavior: Behavior normal.         Judgment: Judgment normal.       Significant Labs:   Recent Lab Results         12/17/22  1141   12/17/22  0606   12/17/22  0504   12/16/22  2038        Ammonia     27         POCT Glucose 126   103     119        TSH     1.137                 Significant Imaging: X-Ray: CXR: X-Ray Chest 1 View (CXR): No results found for this visit on 12/15/22.

## 2022-12-17 NOTE — ASSESSMENT & PLAN NOTE
-BP labile  -May be contributing to symptoms  -Monitor trend  -BB held due to bradycardia  -Orthostatic vitals pending  -Can continue ACEi as tolerated  -Would like benefit from amlodipine vs Doxazosin pending BP trend    12/16/22  -BP still fluctuating  -BB and ARB resumed  -Monitor trend    12/17/2022  MONITOR TREND. BP HIGHER TODAY.

## 2022-12-18 LAB
POCT GLUCOSE: 102 MG/DL (ref 70–110)
POCT GLUCOSE: 107 MG/DL (ref 70–110)
POCT GLUCOSE: 115 MG/DL (ref 70–110)
POCT GLUCOSE: 123 MG/DL (ref 70–110)

## 2022-12-18 PROCEDURE — 63600175 PHARM REV CODE 636 W HCPCS: Performed by: INTERNAL MEDICINE

## 2022-12-18 PROCEDURE — 25000003 PHARM REV CODE 250: Performed by: NURSE PRACTITIONER

## 2022-12-18 PROCEDURE — 25000003 PHARM REV CODE 250: Performed by: INTERNAL MEDICINE

## 2022-12-18 PROCEDURE — 21400001 HC TELEMETRY ROOM

## 2022-12-18 RX ORDER — LOSARTAN POTASSIUM 50 MG/1
50 TABLET ORAL 2 TIMES DAILY
Status: DISCONTINUED | OUTPATIENT
Start: 2022-12-18 | End: 2022-12-19 | Stop reason: HOSPADM

## 2022-12-18 RX ADMIN — METOPROLOL SUCCINATE 25 MG: 25 TABLET, FILM COATED, EXTENDED RELEASE ORAL at 08:12

## 2022-12-18 RX ADMIN — LOSARTAN POTASSIUM 50 MG: 50 TABLET, FILM COATED ORAL at 09:12

## 2022-12-18 RX ADMIN — HEPARIN SODIUM 5000 UNITS: 5000 INJECTION INTRAVENOUS; SUBCUTANEOUS at 02:12

## 2022-12-18 RX ADMIN — MELATONIN TAB 3 MG 6 MG: 3 TAB at 09:12

## 2022-12-18 RX ADMIN — HEPARIN SODIUM 5000 UNITS: 5000 INJECTION INTRAVENOUS; SUBCUTANEOUS at 09:12

## 2022-12-18 RX ADMIN — LOSARTAN POTASSIUM 50 MG: 50 TABLET, FILM COATED ORAL at 08:12

## 2022-12-18 RX ADMIN — HEPARIN SODIUM 5000 UNITS: 5000 INJECTION INTRAVENOUS; SUBCUTANEOUS at 06:12

## 2022-12-18 NOTE — CONSULTS
MONSERRAT spoke with pt daughter; Vicki. MONSERRAT explained role. Pt is already completing skilled at [facility]. Pt daughter will like for her return to [facility] once medically cleared to discharge. MONSERRAT during the week will follow  up with pt skilled days at [facility].

## 2022-12-18 NOTE — CONSULTS
Thank you for your consult to [facility]. We have reviewed the patient chart. This patient does meet criteria for [facility] service at this time. Will assume care on 12/18/2022 at 7AM.

## 2022-12-18 NOTE — PLAN OF CARE
Problem: Adult Inpatient Plan of Care  Goal: Patient-Specific Goal (Individualized)  Outcome: Ongoing, Progressing     Problem: Adult Inpatient Plan of Care  Goal: Plan of Care Review  Outcome: Ongoing, Progressing     Problem: Adult Inpatient Plan of Care  Goal: Optimal Comfort and Wellbeing  Outcome: Ongoing, Progressing     Problem: Adult Inpatient Plan of Care  Goal: Readiness for Transition of Care  Outcome: Ongoing, Progressing     Problem: Diabetes Comorbidity  Goal: Blood Glucose Level Within Targeted Range  Outcome: Ongoing, Progressing

## 2022-12-18 NOTE — PLAN OF CARE
POC reviewed with pt. Pt verbalizes understanding of POC. No questions at this time.  AAOx4. NADN.  NSR on cardiac monitor.  Pt remains free of falls   No complaints at this time.  Safety measures in place.   Informed pt to call for assistance before getting up. Pt verbalizes understanding.

## 2022-12-19 VITALS
DIASTOLIC BLOOD PRESSURE: 70 MMHG | HEIGHT: 61 IN | HEART RATE: 64 BPM | RESPIRATION RATE: 16 BRPM | SYSTOLIC BLOOD PRESSURE: 161 MMHG | OXYGEN SATURATION: 98 % | WEIGHT: 136.69 LBS | TEMPERATURE: 98 F | BODY MASS INDEX: 25.81 KG/M2

## 2022-12-19 LAB
POCT GLUCOSE: 101 MG/DL (ref 70–110)
POCT GLUCOSE: 107 MG/DL (ref 70–110)
POCT GLUCOSE: 96 MG/DL (ref 70–110)

## 2022-12-19 PROCEDURE — 99232 SBSQ HOSP IP/OBS MODERATE 35: CPT | Mod: ,,, | Performed by: PHYSICIAN ASSISTANT

## 2022-12-19 PROCEDURE — 97530 THERAPEUTIC ACTIVITIES: CPT | Mod: CQ

## 2022-12-19 PROCEDURE — 97535 SELF CARE MNGMENT TRAINING: CPT

## 2022-12-19 PROCEDURE — 63600175 PHARM REV CODE 636 W HCPCS: Performed by: INTERNAL MEDICINE

## 2022-12-19 PROCEDURE — 99232 PR SUBSEQUENT HOSPITAL CARE,LEVL II: ICD-10-PCS | Mod: ,,, | Performed by: PHYSICIAN ASSISTANT

## 2022-12-19 PROCEDURE — 97116 GAIT TRAINING THERAPY: CPT | Mod: CQ

## 2022-12-19 PROCEDURE — 92526 ORAL FUNCTION THERAPY: CPT

## 2022-12-19 PROCEDURE — 25000003 PHARM REV CODE 250: Performed by: NURSE PRACTITIONER

## 2022-12-19 RX ORDER — TAPENTADOL HYDROCHLORIDE 50 MG/1
50 TABLET, FILM COATED ORAL EVERY 8 HOURS PRN
Qty: 1 TABLET | Refills: 0
Start: 2022-12-19

## 2022-12-19 RX ORDER — NIFEDIPINE 30 MG/1
30 TABLET, FILM COATED, EXTENDED RELEASE ORAL DAILY
Qty: 30 TABLET | Refills: 0
Start: 2022-12-19 | End: 2023-11-02

## 2022-12-19 RX ORDER — LOSARTAN POTASSIUM 50 MG/1
50 TABLET ORAL 2 TIMES DAILY
Qty: 30 TABLET | Refills: 0
Start: 2022-12-19 | End: 2023-02-09 | Stop reason: SDUPTHER

## 2022-12-19 RX ORDER — METOPROLOL SUCCINATE 25 MG/1
25 TABLET, EXTENDED RELEASE ORAL DAILY
Qty: 30 TABLET | Refills: 0
Start: 2022-12-20 | End: 2023-01-31 | Stop reason: SDUPTHER

## 2022-12-19 RX ADMIN — HEPARIN SODIUM 5000 UNITS: 5000 INJECTION INTRAVENOUS; SUBCUTANEOUS at 01:12

## 2022-12-19 RX ADMIN — METOPROLOL SUCCINATE 25 MG: 25 TABLET, FILM COATED, EXTENDED RELEASE ORAL at 08:12

## 2022-12-19 RX ADMIN — HYDRALAZINE HYDROCHLORIDE 10 MG: 20 INJECTION, SOLUTION INTRAMUSCULAR; INTRAVENOUS at 04:12

## 2022-12-19 RX ADMIN — HYDRALAZINE HYDROCHLORIDE 10 MG: 20 INJECTION, SOLUTION INTRAMUSCULAR; INTRAVENOUS at 12:12

## 2022-12-19 RX ADMIN — LOSARTAN POTASSIUM 50 MG: 50 TABLET, FILM COATED ORAL at 08:12

## 2022-12-19 RX ADMIN — HEPARIN SODIUM 5000 UNITS: 5000 INJECTION INTRAVENOUS; SUBCUTANEOUS at 06:12

## 2022-12-19 NOTE — PROCEDURES
DATE: 12/6/22    EEG NUMBER: BR     REFERRING PHYSICIAN:  Dr. Nino     This EEG was performed to assess for subclinical seizures      ELECTROENCEPHALOGRAM REPORT     METHODOLOGY:  Electroencephalographic (EEG) recording is with electrodes placed according to the International 10-20 placement system.  Thirty two (32) channels of digital signal are simultaneously recorded from the scalp and may include EKG, EMG, and/or eye monitors.   Recording band pass was 0.1 to 512 hz.  Digital video recording of the patient is simultaneously recorded with the EEG.  The nursing staff report clinical symptoms and may press an event button when the patient has symptoms of clinical interest to the treating physicians.  EEG and video recording is stored and archived in digital format.  The entire recording is visually reviewed, and the times identified by computer analysis as being spikes or seizures are reviewed again.  Activation procedures which include photic stimulation, hyperventilation and instructing patients to perform simple task are done in selected patients.   Compresses spectral analysis (CSA) is also performed on the activity recorded from each individual channel.  This is displayed as a power display of frequencies from 0 to 30 Hz over time.   The CSA analysis is done and displayed continuously.  This is reviewed for asymmetries in power between homologous areas of the scalp and for presence of changes in power which can be seen when seizures occur.  Sections of suspected abnormalities on the CSA is then compared with the original EEG recording.                Rip van Wafels software was also utilized in the review of this study.  This software suite analyzes the EEG recording in multiple domains.  Coherence and rhythmicity is computed to identify EEG sections which may contain organized seizures.  Each channel undergoes analysis to detect presence of spike and sharp waves which have special and morphological  characteristic of epileptic activity.  The routine EEG recording is converted from spacial into frequency domain.  This is then displayed comparing homologous areas to identify areas of significant asymmetry.  Algorithm to identify non-cortically generated artifact is used to separate eye movement, EMG and other artifact from the EEG.     EEG FINDINGS:  The recording was obtained with a number of standard bipolar and referential montages during wakefulness, drowsiness.  In the alert state, the posterior background rhythm was a symmetric, well-modulated 6-8 Hz activity, which reacted symmetrically to eye opening.  Intermittent photic stimulation failed to evoke symmetric posterior driving responses. No abnormalities were activated by photic stimulation.  During drowsiness, the background rhythm waxed and waned and there were periods of slowing.  During stage II sleep, symmetric V waves and sleep spindles were noted. There were no interictal epileptiform abnormalities and no clinical or electrographic seizures were recorded.    The EKG channel revealed a sinus rhythm.     IMPRESSION:  This is an abnormal EEG during wakefulness, drowsiness. Diffuse slowing was noted.      CLINICAL CORRELATION:  The patient is an 82  year-old female who is being evaluated for altered sensorium. The patient is currently not maintained on any antiseizure medications. This is an abnormal EEG during wakefulness, drowsiness.  The overall degree of disorganization and slowing for given age is suggestive of a mild encephalopathy, nonspecific to the cause.  There is no evidence of an epileptic process on this recording.  No seizures were recorded during this study.

## 2022-12-19 NOTE — ASSESSMENT & PLAN NOTE
Likely multifactorial  Hold beta blocker  Continue IVFs  Repeat orthostatic vitals in am  Echo results results reviewed   -Seizure not excluded with EEG per Neurology   Await further  -CT of head ordered due to increased confusion over the last month reported   -PT/OT     12/18  Mentation improved   EEG pending, follow

## 2022-12-19 NOTE — SUBJECTIVE & OBJECTIVE
Review of Systems   Constitutional: Negative.   HENT: Negative.     Eyes: Negative.    Cardiovascular: Negative.    Endocrine: Negative.    Hematologic/Lymphatic: Negative.    Skin: Negative.    Musculoskeletal:  Positive for arthritis.   Gastrointestinal: Negative.    Neurological: Negative.    Psychiatric/Behavioral: Negative.     Allergic/Immunologic: Negative.    Objective:     Vital Signs (Most Recent):  Temp: 98.2 °F (36.8 °C) (12/19/22 1139)  Pulse: 65 (12/19/22 1139)  Resp: 16 (12/19/22 1139)  BP: (!) 164/84 (12/19/22 1139)  SpO2: 97 % (12/19/22 1139)   Vital Signs (24h Range):  Temp:  [97.4 °F (36.3 °C)-98.8 °F (37.1 °C)] 98.2 °F (36.8 °C)  Pulse:  [65-84] 65  Resp:  [16-20] 16  SpO2:  [94 %-97 %] 97 %  BP: (136-188)/(65-86) 164/84     Weight: 62 kg (136 lb 11 oz)  Body mass index is 25.83 kg/m².     SpO2: 97 %         Intake/Output Summary (Last 24 hours) at 12/19/2022 1251  Last data filed at 12/19/2022 0605  Gross per 24 hour   Intake --   Output 500 ml   Net -500 ml       Lines/Drains/Airways       Drain  Duration             Female External Urinary Catheter 12/15/22 1400 3 days              Peripheral Intravenous Line  Duration                  Peripheral IV - Single Lumen Anterior;Proximal;Right Forearm -- days                    Physical Exam  Vitals and nursing note reviewed.   Constitutional:       General: She is not in acute distress.     Appearance: Normal appearance. She is well-developed. She is not diaphoretic.   HENT:      Head: Normocephalic and atraumatic.   Eyes:      General:         Right eye: No discharge.         Left eye: No discharge.      Pupils: Pupils are equal, round, and reactive to light.   Neck:      Thyroid: No thyromegaly.      Vascular: No JVD.      Trachea: No tracheal deviation.   Cardiovascular:      Rate and Rhythm: Normal rate and regular rhythm.      Heart sounds: Normal heart sounds, S1 normal and S2 normal. No murmur heard.  Pulmonary:      Effort: Pulmonary  effort is normal. No respiratory distress.      Breath sounds: Normal breath sounds. No wheezing or rales.   Abdominal:      General: There is no distension.      Palpations: Abdomen is soft.      Tenderness: There is no rebound.   Musculoskeletal:      Cervical back: Neck supple.      Right lower leg: No edema.      Left lower leg: No edema.   Skin:     General: Skin is warm and dry.      Findings: No erythema.   Neurological:      Mental Status: She is alert and oriented to person, place, and time.   Psychiatric:         Mood and Affect: Mood normal.         Behavior: Behavior normal.         Thought Content: Thought content normal.       Significant Labs: CMP No results for input(s): NA, K, CL, CO2, GLU, BUN, CREATININE, CALCIUM, PROT, ALBUMIN, BILITOT, ALKPHOS, AST, ALT, ANIONGAP, ESTGFRAFRICA, EGFRNONAA in the last 48 hours., CBC No results for input(s): WBC, HGB, HCT, PLT in the last 48 hours., Troponin No results for input(s): TROPONINI in the last 48 hours., and All pertinent lab results from the last 24 hours have been reviewed.    Significant Imaging: Echocardiogram: Transthoracic echo (TTE) complete (Cupid Only):   Results for orders placed or performed during the hospital encounter of 12/15/22   Echo   Result Value Ref Range    BSA 1.63 m2    LA WIDTH 3.20 cm    IVC diameter 1.31 cm    Left Ventricular Outflow Tract Mean Velocity 0.74 cm/s    Left Ventricular Outflow Tract Mean Gradient 2.35 mmHg    LVIDd 3.59 3.5 - 6.0 cm    IVS 1.08 0.6 - 1.1 cm    Posterior Wall 1.02 0.6 - 1.1 cm    LVIDs 2.49 2.1 - 4.0 cm    FS 31 28 - 44 %    STJ 2.97 cm    Ascending aorta 3.10 cm    LV mass 115.38 g    LA size 3.31 cm    RVDD 3.34 cm    TAPSE 2.10 cm    Left Ventricle Relative Wall Thickness 0.57 cm    AV regurgitation pressure 1/2 time [id number] ms    AV mean gradient 5 mmHg    AV valve area 1.85 cm2    AV Velocity Ratio 0.64     AV index (prosthetic) 0.75     MV valve area p 1/2 method 3.24 cm2    E/A  ratio 0.99     E wave deceleration time 234.23 msec    IVRT 99.90 msec    LVOT diameter 1.77 cm    LVOT area 2.5 cm2    LVOT peak mehrdad 0.91 m/s    LVOT peak VTI 31.80 cm    Ao peak mehrdad 1.42 m/s    Ao VTI 42.3 cm    RVOT peak mehrdad 0.65 m/s    RVOT peak VTI 21.5 cm    LVOT stroke volume 78.21 cm3    AV peak gradient 8 mmHg    PV mean gradient 1.17 mmHg    MV Peak E Mehrdad 0.77 m/s    AR Max Mehrdad 4.54 m/s    TR Max Mehrdad 3.50 m/s    MV stenosis pressure 1/2 time 67.93 ms    MV Peak A Mehrdad 0.78 m/s    LV Systolic Volume 22.07 mL    LV Systolic Volume Index 13.8 mL/m2    LV Diastolic Volume 54.01 mL    LV Diastolic Volume Index 33.76 mL/m2    LV Mass Index 72 g/m2    RA Major Axis 3.98 cm    Left Atrium Major Axis 4.50 cm    Triscuspid Valve Regurgitation Peak Gradient 49 mmHg    RA Width 2.50 cm    Right Atrial Pressure (from IVC) 3 mmHg    EF 60 %    TV rest pulmonary artery pressure 52 mmHg    Narrative    · The left ventricle is normal in size with concentric remodeling and   normal systolic function.  · The estimated ejection fraction is 60%.  · Normal left ventricular diastolic function.  · Normal right ventricular size with normal right ventricular systolic   function.  · Mild aortic regurgitation.  · There is moderate pulmonary hypertension.  · Normal central venous pressure (3 mmHg).  · The estimated PA systolic pressure is 52 mmHg.      , EKG: Reviewed, and X-Ray: CXR: X-Ray Chest 1 View (CXR): No results found for this visit on 12/15/22. and X-Ray Chest PA and Lateral (CXR): No results found for this visit on 12/15/22.

## 2022-12-19 NOTE — PLAN OF CARE
[facility] - Telemetry (Hospital)  Discharge Final Note    Primary Care Provider: Angeles Carson MD    Expected Discharge Date: 12/19/2022    Final Discharge Note (most recent)       Final Note - 12/19/22 1615          Final Note    Assessment Type Final Discharge Note     Anticipated Discharge Disposition Skilled Nursing Facility        Post-Acute Status    Post-Acute Authorization Placement     Post-Acute Placement Status Set-up Complete/Auth obtained   [facility]    Discharge Delays None known at this time                     Important Message from Medicare             Contact Info       Angeles Carson MD   Specialty: Internal Medicine   Relationship: PCP - [address] [place] [address]   Phone: [phone]       Next Steps: Follow up in 1 week(s)    Instructions: call clinic to schedule appointment    Domingo Martins MD   Specialty: Interventional Cardiology, Cardiology    [address] [facility] [place] [address]   Phone: [phone]       Next Steps: Follow up    Instructions: clinic will get in touch to schedule follow up appointment and to set up outpatient heart monitor    Gene Frederick MD   Specialty: Neurology    [address] [address]   Phone: [phone]       Next Steps: Follow up in 2 week(s)

## 2022-12-19 NOTE — PROGRESS NOTES
[facility] - Telemetry ([facility] Medicine  Telemedicine Progress Note    Patient Name: Leslie Wood  MRN: [id number]  Patient Class: IP- Inpatient   Admission Date: 12/15/2022  Length of Stay: 2 days  Attending Physician: Merly Vega MD  Primary Care Provider: Angeles Carson MD          Subjective:     Principal Problem:Syncope        HPI:  The patient is 81 yo female with past medical history of arthritis, GERD, CAD, MI, hypertension, COVID, cardiomyopathy and stroke who presented to the ED with multiple syncopal episodes. The patient is at [place] for skilled care due to a significant fall in November. She has had 2 episodes  this week. This morning she was sitting at the table when she  suddenly lost consciousness. She denied chest pain, SOB, lightheadedness, or palpitations. She was noted to have heart rate in the 40s. She was orthostatic by blood pressure. Cardiology was consulted as her medications were recently adjusted. Patient feels she is at her baseline. Her syncopal episodes have all occurred at  rest while seated. Echo pending. Hospital medicine consulted for continuation of care. Patient placed in observation.      Overview/Hospital Course:  Pt admitted to Observation for Syncope in the setting Symptomatic bradycardia. Cardiology consulted and beta blocker held. Echo showed concentric remodeling and normal systolic function. EF 60%. Mild aortic regurgitation and moderate pulmonary hypertension. Neurology consulted with seizures considered and EEG ordered. CT of head pending in the setting of increased confusion post fall and over the last month . Infectious process ruled out with UA and chest xray unremarkable. HR normalized with case discussed with Cardiology and Lopressor resumed at decreased dose. Losartan resumed for BP control.     12/17:  Patient alert and oriented, appeared more awake, denied acute issues overnight.    Pt oriented to person, place and birth date- as per  family pt at baseline mentation  Able to tolerate p.o. diet, hemodynamically stable.    Syncope likely secondary to orthostatic hypotension, would recommend compression stockings upon discharge   CT head negative, will follow up on EEG, PT OT.    Ammonia within normal limits, TSH within normal limits.  Cardio on board, resume low-dose beta-blocker, will monitor heart rate, blood pressure.          Interval History: No acute events overnight. -180s titrate losartan to 50 mg BID, monitor. EEG results pending, follow. Plan dc to [facility] when medically stable.     Review of Systems   Respiratory:  Negative for shortness of breath.    Cardiovascular:  Negative for chest pain.   Gastrointestinal:  Negative for abdominal pain, nausea and vomiting.   Objective:     Vital Signs (Most Recent):  Temp: 97.4 °F (36.3 °C) (12/18/22 1949)  Pulse: 68 (12/18/22 1949)  Resp: 20 (12/18/22 1949)  BP: (!) 187/77 (12/18/22 1949)  SpO2: (!) 94 % (12/18/22 1949)   Vital Signs (24h Range):  Temp:  [97.4 °F (36.3 °C)-98.8 °F (37.1 °C)] 97.4 °F (36.3 °C)  Pulse:  [61-83] 68  Resp:  [16-20] 20  SpO2:  [94 %-96 %] 94 %  BP: (143-187)/(69-79) 187/77     Weight: 61.8 kg (136 lb 3.9 oz)  Body mass index is 25.74 kg/m².    Intake/Output Summary (Last 24 hours) at 12/18/2022 2142  Last data filed at 12/18/2022 0007  Gross per 24 hour   Intake --   Output 250 ml   Net -250 ml      Physical Exam  Vitals and nursing note reviewed.   Pulmonary:      Effort: No respiratory distress.   Neurological:      Mental Status: She is alert and oriented to person, place, and time.   Psychiatric:         Mood and Affect: Mood normal.       Significant Imaging: I have reviewed all pertinent imaging results/findings within the past 24 hours.      Assessment/Plan:      * Syncope  Likely multifactorial  Hold beta blocker  Continue IVFs  Repeat orthostatic vitals in am  Echo results results reviewed   -Seizure not excluded with EEG per Neurology   Await  further  -CT of head ordered due to increased confusion over the last month reported   -PT/OT     12/18  Mentation improved   EEG pending, follow       Disorientation        Bradycardia  Continue telemetry monitoring  - HR normalized  -cardiology following   -Lopressor resumed at decreased dose per cardiology - Observation continued       Stress-induced cardiomyopathy  Per cardiology  EF 30-40% last admission  Repeat echo showed concentric remodeling and normal systolic function.   The estimated ejection fraction is 60%.   Normal left ventricular diastolic function.   Normal right ventricular size with normal right ventricular systolic function.   Mild aortic regurgitation.   There is moderate pulmonary hypertension.      Altered mental state  -confusion noted upon exam   -increased episodes of confusion reported per daughter at bedside   -CT of head results pending    -UA and chest xray negative for infectious process   -Syncope work up in progress  -neuro checks   -Neurology consulted- EEG pending     Type 2 diabetes mellitus without complication, without long-term current use of insulin  Patient's FSGs are controlled with diet.  Last A1c reviewed-   Lab Results   Component Value Date    HGBA1C 5.3 12/15/2022     Most recent fingerstick glucose reviewed-100   Current correctional scale  Low  Maintain anti-hyperglycemic dose as follows-   Antihyperglycemics (From admission, onward)    Start     Stop Route Frequency Ordered    12/15/22 1632  insulin aspart U-100 pen 0-5 Units         -- SubQ Before meals & nightly PRN 12/15/22 1535        Hold Oral hypoglycemics while patient is in the hospital.    Essential hypertension  Hold beta blocker due to bradycardia  -hx of labile BP noted    IV hydralazine with parameters   Possible doxazosin based upon BP per cardiology  Monitor blood pressure  -ARB resumed on 12/16/22 for control     12/18   sbp 170-180   Increase losartan to 50 mg BID       VTE Risk Mitigation  (From admission, onward)         Ordered     heparin (porcine) injection 5,000 Units  Every 8 hours         12/15/22 1549     Place sequential compression device  Until discontinued         12/15/22 1546                      I have completed this tele-visit without the assistance of a telepresenter.    The attending portion of this evaluation, treatment, and documentation was performed per Angie Bonilla NP via Telemedicine AudioVisual using the secure [employer] software platform with 2 way audio/video. The provider was located off-site and the patient is located in the hospital. The aforementioned video software was utilized to document the relevant history and physical exam    Angie Bonilla NP  Department of Hospital Medicine   [facility] ([facility])

## 2022-12-19 NOTE — PT/OT/SLP PROGRESS
Speech Language Pathology Treatment    Patient Name:  Leslie Wood   MRN:  [id number]  Admitting Diagnosis: Syncope and collapse    Recommendations:                 General Recommendations:  Follow-up not indicated  Diet recommendations:  Regular Diet - IDDSI Level 7, Liquid Diet Level: Thin liquids - IDDSI Level 0   Aspiration Precautions: Frequent oral care and Standard aspiration precautions   General Precautions: Standard, aspiration  Communication strategies:  none    Subjective     Pt seen bedside for ST.  Awaiting d/c and EEG results.  No c/o pain.  Pt's daughter present.  Patient goals: To go home.    Pain/Comfort:  Pain Rating 1: 0/10  Pain Rating Post-Intervention 1: 0/10  Pain Rating 2: 0/10  Pain Rating Post-Intervention 2: 0/10    Respiratory Status: Room air    Objective:     Has the patient been evaluated by SLP for swallowing?   Yes  Keep patient NPO? No   Current Respiratory Status: room air    Pt consuming IDDSI 7 (regular solids) with IDDSI 0 (thin liquids) without incident.    Pt reported hx esophageal dysphagia/dilations--educated on compensatory strategies to maximize safety/efficiency during intake.  Pt denied globus sensation/pharyngeus at this time.    Pt's communication back to baseline and able to meet functional needs.    Assessment:     Leslie Wood is a 82 y.o. female admitted with syncopal event and ST consulted for swallowing assessment.  Bedside CSE completed and recommended IDDSI 7 (regular solids) with IDDSI 0 (thin liquids), following basic precautions.  She is consuming diet without incident.  Communication is back to baseline and able to meet functional needs during conversational tasks.  No further acute SLP intervention indicated at this time.  MD to reconsult as needed.    Goals:   Multidisciplinary Problems       SLP Goals       Not on file              Multidisciplinary Problems (Resolved)          Problem: SLP    Goal Priority Disciplines Outcome   SLP Goal   (Resolved)      SLP Met   Description: 1.  Pt will consume IDDSI 7/0 without incident.                       Plan:     Patient to be seen:  3 x/week   Plan of Care expires:  12/24/22  Plan of Care reviewed with:  patient, daughter   SLP Follow-Up:  No       Discharge recommendations:  SNF prior to admit  Barriers to Discharge:  None    Time Tracking:     SLP Treatment Date:   12/19/22  Speech Start Time:  1430  Speech Stop Time:  1500     Speech Total Time (min):  30 min    Billable Minutes: Treatment Swallowing Dysfunction 15 minutes and Self Care/Home Management Training 15 minutes    12/19/2022

## 2022-12-19 NOTE — PLAN OF CARE
Spoke to Dr. Nino.  EEG is still pending.  Advised we need discharge orders by 3pm so she can go to the SNF today.  She will contact Neurology to read EEG.

## 2022-12-19 NOTE — ASSESSMENT & PLAN NOTE
- supine HTN with orthostatic hypotension   - cardiology consulted- ACE switched to ARB, B-blocker dose decreased, Nifedipine added for BP control   - monitor BP per facility  - follow up with cardiology clinic on discharge  - continue OK hose/Johnson

## 2022-12-19 NOTE — ASSESSMENT & PLAN NOTE
-BP labile  -May be contributing to symptoms  -Monitor trend  -BB held due to bradycardia  -Orthostatic vitals pending  -Can continue ACEi as tolerated  -Would like benefit from amlodipine vs Doxazosin pending BP trend    12/16/22  -BP still fluctuating  -BB and ARB resumed  -Monitor trend    12/17/2022  MONITOR TREND. BP HIGHER TODAY.    12/19/22  -Mgmt as per hospital medicine

## 2022-12-19 NOTE — PT/OT/SLP PROGRESS
Physical Therapy  Treatment    Leslie Wood   MRN: [id number]   Admitting Diagnosis: Syncope    PT Received On: 12/19/22  PT Start Time: 0920     PT Stop Time: 0945    PT Total Time (min): 25 min       Billable Minutes:  Gait Training 10 and Therapeutic Activity 15    Treatment Type: Treatment  PT/PTA: PTA     PTA Visit Number: 1       General Precautions: Standard, fall  Orthopedic Precautions: RLE weight bearing as tolerated  Braces: N/A  Respiratory Status: Room air    Spiritual, Cultural Beliefs, Lutheran Practices, Values that Affect Care: no    Subjective:  Communicated with patient's nurse, Danis, and completed Epic chart review prior to session.  Patient agreed to PT session.  Reports some soreness in R knee due to a patellar fx about 1 month ago.        Pain/Comfort  Pain Rating 1: 0/10  Pain Rating Post-Intervention 1: 0/10    Objective:   Patient found with: telemetry, peripheral IV    Supine > sit EOB: CGA    STS from EOB > RW: Min A (VC for hand placement)    75ft w/ RW Min A (intermittent VC for safety w/ RW mgmt)    Stand pivot T/F to chair w/ RW: Min A (VC for safety w/ RW mgmt and sequencing of task)    Completed x10 reps AROM TE to BLE: LAQ, Hip Flex, AP    Educated patient on importance of increased tolerance to upright position and direct impact on CV endurance and strength. Patient encouraged to sit up in chair for a minimum of 2 consecutive hours per day. Encouraged patient to perform AROM TE to BLE throughout the day within all available planes of motion. Re enforced importance of utilizing call light to meet needs in room and not attempt to get up without staff assistance. Patient verbalized understanding and agreed to comply.      AM-PAC 6 CLICK MOBILITY  How much help from another person does this patient currently need?   1 = Unable, Total/Dependent Assistance  2 = A lot, Maximum/Moderate Assistance  3 = A little, Minimum/Contact Guard/Supervision  4 = None, Modified  [place]/Independent    Turning over in bed (including adjusting bedclothes, sheets and blankets)?: 4  Sitting down on and standing up from a chair with arms (e.g., wheelchair, bedside commode, etc.): 3  Moving from lying on back to sitting on the side of the bed?: 4  Moving to and from a bed to a chair (including a wheelchair)?: 3  Need to walk in hospital room?: 3  Climbing 3-5 steps with a railing?: 1  Basic Mobility Total Score: 18    AM-PAC Raw Score CMS G-Code Modifier Level of Impairment Assistance   6 % Total / Unable   7 - 9 CM 80 - 100% Maximal Assist   10 - 14 CL 60 - 80% Moderate Assist   15 - 19 CK 40 - 60% Moderate Assist   20 - 22 CJ 20 - 40% Minimal Assist   23 CI 1-20% SBA / CGA   24 CH 0% Independent/ Mod I     Patient left up in chair with call button in reach and daughter present.    Assessment:  Leslie Wood is a 82 y.o. female with a medical diagnosis of Syncope and presents with overall decline in functional mobility. Patient would continue to benefit from skilled PT to address functional limitations listed below in order to return to PLOF/decrease caregiver burden.     Rehab identified problem list/impairments: weakness, impaired endurance, impaired self care skills, impaired functional mobility, gait instability, impaired balance, decreased coordination, decreased ROM, impaired cardiopulmonary response to activity    Rehab potential is good.    Activity tolerance: Fair    Discharge recommendations: nursing facility, skilled      Barriers to discharge:      Equipment recommendations: none     GOALS:   Multidisciplinary Problems       Physical Therapy Goals          Problem: Physical Therapy    Goal Priority Disciplines Outcome Goal Variances Interventions   Physical Therapy Goal     PT, PT/OT      Description: Pt will perform bed mobility independently in order to participate in EOB activity.  Pt will perform transfers independently in order to participate in OOB activity.   Pt  will ambulate 150ft mod I with LRAD in order to participate in daily tasks.                         PLAN:    Patient to be seen 3 x/week to address the above listed problems via gait training, therapeutic activities, therapeutic exercises  Plan of Care expires: 12/31/22  Plan of Care reviewed with: patient, daughter         12/19/2022

## 2022-12-19 NOTE — PLAN OF CARE
Patient returning to [facility].  Please call report [phone].  Transportation picking up at 4:30pm.     Discharge orders sent via [employer].       12/19/22 3321   Post-Acute Status   Post-Acute Authorization Placement   Post-Acute Placement Status Set-up Complete/Auth obtained   Discharge Plan   Discharge Plan A Skilled Nursing Facility

## 2022-12-19 NOTE — PROGRESS NOTES
"[facility] - Telemetry ([facility])  Cardiology  Progress Note    Patient Name: Leslie Wood  MRN: [id number]  Admission Date: 12/15/2022  Hospital Length of Stay: 3 days  Code Status: Full Code   Attending Physician: Rosana Nino MD   Primary Care Physician: Angeles Carson MD  Expected Discharge Date:   Principal Problem:Syncope    Subjective:   HPI:  Ms. Wood is an 82 year old female patient whose current medical conditions include pre-DM, HTN, prior CVA, cardiomyopathy (felt to be stress-induced), and NSTEMI/chest pain s/p C which showed severe mid myocardial bridging who presented to [facility] ED today from her SNF facility due to syncope. Per patient's daughter, patient was sitting at the breakfast table when she suddenly lost consciousness. Unknown duration. Patient denied any associated chest pain, SOB, lightheadedness, dizziness, or palpitations prior to the event. Initial workup in ED revealed bradycardia (HR in 40's) and labile BP and patient was subsequently admitted for further evaluation and treatment. Cardiology consulted to assist with management. Patient seen and examined today in ED. Feels back to her baseline. No complaints. Daughter reports she suffered a severe fall in November and has been in SNF facility since that time. She reports has had several syncopal episodes, all occurring at rest/when patient is seated. Chart reviewed. Troponin x 1 negative. Repeat echo pending. EKG without evidence of heart block. Orthostatic vitals pending.     Hospital Course:   12/16/22-Patient seen and examined today, resting in bed. Feeling better. Reported some "jerking movements yesterday in bilateral legs" that have since resolved. CT of head negative. HR improved, BB resumed but at lower dose. CT of head showed NAF.    12/17/2022  STATUS QUO BP SEEMS LABILE WAS BETETR CONTROLLED YESTERDAY. WILL CONTINUE SAME MEDS.     12/19/22-Patient seen and examined today, sitting up in bedside chair. Feels well, back to " her baseline. Ambulated earlier with PT/OT without issue. BP still fluctuating but seems better controlled.           Review of Systems   Constitutional: Negative.   HENT: Negative.     Eyes: Negative.    Cardiovascular: Negative.    Endocrine: Negative.    Hematologic/Lymphatic: Negative.    Skin: Negative.    Musculoskeletal:  Positive for arthritis.   Gastrointestinal: Negative.    Neurological: Negative.    Psychiatric/Behavioral: Negative.     Allergic/Immunologic: Negative.    Objective:     Vital Signs (Most Recent):  Temp: 98.2 °F (36.8 °C) (12/19/22 1139)  Pulse: 65 (12/19/22 1139)  Resp: 16 (12/19/22 1139)  BP: (!) 164/84 (12/19/22 1139)  SpO2: 97 % (12/19/22 1139)   Vital Signs (24h Range):  Temp:  [97.4 °F (36.3 °C)-98.8 °F (37.1 °C)] 98.2 °F (36.8 °C)  Pulse:  [65-84] 65  Resp:  [16-20] 16  SpO2:  [94 %-97 %] 97 %  BP: (136-188)/(65-86) 164/84     Weight: 62 kg (136 lb 11 oz)  Body mass index is 25.83 kg/m².     SpO2: 97 %         Intake/Output Summary (Last 24 hours) at 12/19/2022 1251  Last data filed at 12/19/2022 0605  Gross per 24 hour   Intake --   Output 500 ml   Net -500 ml       Lines/Drains/Airways       Drain  Duration             Female External Urinary Catheter 12/15/22 1400 3 days              Peripheral Intravenous Line  Duration                  Peripheral IV - Single Lumen Anterior;Proximal;Right Forearm -- days                    Physical Exam  Vitals and nursing note reviewed.   Constitutional:       General: She is not in acute distress.     Appearance: Normal appearance. She is well-developed. She is not diaphoretic.   HENT:      Head: Normocephalic and atraumatic.   Eyes:      General:         Right eye: No discharge.         Left eye: No discharge.      Pupils: Pupils are equal, round, and reactive to light.   Neck:      Thyroid: No thyromegaly.      Vascular: No JVD.      Trachea: No tracheal deviation.   Cardiovascular:      Rate and Rhythm: Normal rate and regular rhythm.       Heart sounds: Normal heart sounds, S1 normal and S2 normal. No murmur heard.  Pulmonary:      Effort: Pulmonary effort is normal. No respiratory distress.      Breath sounds: Normal breath sounds. No wheezing or rales.   Abdominal:      General: There is no distension.      Palpations: Abdomen is soft.      Tenderness: There is no rebound.   Musculoskeletal:      Cervical back: Neck supple.      Right lower leg: No edema.      Left lower leg: No edema.   Skin:     General: Skin is warm and dry.      Findings: No erythema.   Neurological:      Mental Status: She is alert and oriented to person, place, and time.   Psychiatric:         Mood and Affect: Mood normal.         Behavior: Behavior normal.         Thought Content: Thought content normal.       Significant Labs: CMP No results for input(s): NA, K, CL, CO2, GLU, BUN, CREATININE, CALCIUM, PROT, ALBUMIN, BILITOT, ALKPHOS, AST, ALT, ANIONGAP, ESTGFRAFRICA, EGFRNONAA in the last 48 hours., CBC No results for input(s): WBC, HGB, HCT, PLT in the last 48 hours., Troponin No results for input(s): TROPONINI in the last 48 hours., and All pertinent lab results from the last 24 hours have been reviewed.    Significant Imaging: Echocardiogram: Transthoracic echo (TTE) complete ([facility] Only):   Results for orders placed or performed during the hospital encounter of 12/15/22   Echo   Result Value Ref Range    BSA 1.63 m2    LA WIDTH 3.20 cm    IVC diameter 1.31 cm    Left Ventricular Outflow Tract Mean Velocity 0.74 cm/s    Left Ventricular Outflow Tract Mean Gradient 2.35 mmHg    LVIDd 3.59 3.5 - 6.0 cm    IVS 1.08 0.6 - 1.1 cm    Posterior Wall 1.02 0.6 - 1.1 cm    LVIDs 2.49 2.1 - 4.0 cm    FS 31 28 - 44 %    STJ 2.97 cm    Ascending aorta 3.10 cm    LV mass 115.38 g    LA size 3.31 cm    RVDD 3.34 cm    TAPSE 2.10 cm    Left Ventricle Relative Wall Thickness 0.57 cm    AV regurgitation pressure 1/2 time [id number] ms    AV mean gradient 5 mmHg    AV valve area  1.85 cm2    AV Velocity Ratio 0.64     AV index (prosthetic) 0.75     MV valve area p 1/2 method 3.24 cm2    E/A ratio 0.99     E wave deceleration time 234.23 msec    IVRT 99.90 msec    LVOT diameter 1.77 cm    LVOT area 2.5 cm2    LVOT peak mehrdad 0.91 m/s    LVOT peak VTI 31.80 cm    Ao peak mehrdad 1.42 m/s    Ao VTI 42.3 cm    RVOT peak mehrdad 0.65 m/s    RVOT peak VTI 21.5 cm    LVOT stroke volume 78.21 cm3    AV peak gradient 8 mmHg    PV mean gradient 1.17 mmHg    MV Peak E Mehrdad 0.77 m/s    AR Max Mehrdad 4.54 m/s    TR Max Mehrdad 3.50 m/s    MV stenosis pressure 1/2 time 67.93 ms    MV Peak A Mehrdad 0.78 m/s    LV Systolic Volume 22.07 mL    LV Systolic Volume Index 13.8 mL/m2    LV Diastolic Volume 54.01 mL    LV Diastolic Volume Index 33.76 mL/m2    LV Mass Index 72 g/m2    RA Major Axis 3.98 cm    Left Atrium Major Axis 4.50 cm    Triscuspid Valve Regurgitation Peak Gradient 49 mmHg    RA Width 2.50 cm    Right Atrial Pressure (from IVC) 3 mmHg    EF 60 %    TV rest pulmonary artery pressure 52 mmHg    Narrative    · The left ventricle is normal in size with concentric remodeling and   normal systolic function.  · The estimated ejection fraction is 60%.  · Normal left ventricular diastolic function.  · Normal right ventricular size with normal right ventricular systolic   function.  · Mild aortic regurgitation.  · There is moderate pulmonary hypertension.  · Normal central venous pressure (3 mmHg).  · The estimated PA systolic pressure is 52 mmHg.      , EKG: Reviewed, and X-Ray: CXR: X-Ray Chest 1 View (CXR): No results found for this visit on 12/15/22. and X-Ray Chest PA and Lateral (CXR): No results found for this visit on 12/15/22.    Assessment and Plan:   Patient who presents s/p syncope. Bradycardia resolved, tolerating low dose BB. Will need OP extended monitor. BP fluctuating but seems better controlled, mgmt as per hospital medicine.     * Syncope  -Presents s/p syncopal episode that occurred at rest while eating  breakfast  -Suspect arrhythmia mediated, HR in 40's upon arrival  -EKG without evidence of high grade AV block  -Troponin negative  -Hold BB for now  -Monitor overnight  -Check orthostatics as labile BP may be contributing to presentation    12/16/22  -Stable  -Monitor BP and HR trend s/p med adjustments  -OP extended monitor    12/17/2022  TOLERATED LOW DOSE B BLOCKERS.    Bradycardia  -HR improved  -BB resumed but at lower dosage  -Monitor  -Will need OP extended monitor    Stress-induced cardiomyopathy  -EF previously 30-40% during prior admission  -Repeat echo pending  -BB held due to bradycardia  -Continue ACEi as tolerated    12/16/22  -EF recovered by recent echo    Type 2 diabetes mellitus without complication, without long-term current use of insulin  -Mgmt as per hospital medicine    Essential hypertension  -BP labile  -May be contributing to symptoms  -Monitor trend  -BB held due to bradycardia  -Orthostatic vitals pending  -Can continue ACEi as tolerated  -Would like benefit from amlodipine vs Doxazosin pending BP trend    12/16/22  -BP still fluctuating  -BB and ARB resumed  -Monitor trend    12/17/2022  MONITOR TREND. BP HIGHER TODAY.    12/19/22  -Mgmt as per hospital medicine        VTE Risk Mitigation (From admission, onward)         Ordered     heparin (porcine) injection 5,000 Units  Every 8 hours         12/15/22 1549     Place sequential compression device  Until discontinued         12/15/22 1546                Meka Franklin PA-C  Cardiology  '[facility] - Telemetry ([facility])

## 2022-12-19 NOTE — SUBJECTIVE & OBJECTIVE
Interval History: No acute events overnight. -180s titrate losartan to 50 mg BID, monitor. EEG results pending, follow. Plan dc to [facility] when medically stable.     Review of Systems   Respiratory:  Negative for shortness of breath.    Cardiovascular:  Negative for chest pain.   Gastrointestinal:  Negative for abdominal pain, nausea and vomiting.   Objective:     Vital Signs (Most Recent):  Temp: 97.4 °F (36.3 °C) (12/18/22 1949)  Pulse: 68 (12/18/22 1949)  Resp: 20 (12/18/22 1949)  BP: (!) 187/77 (12/18/22 1949)  SpO2: (!) 94 % (12/18/22 1949)   Vital Signs (24h Range):  Temp:  [97.4 °F (36.3 °C)-98.8 °F (37.1 °C)] 97.4 °F (36.3 °C)  Pulse:  [61-83] 68  Resp:  [16-20] 20  SpO2:  [94 %-96 %] 94 %  BP: (143-187)/(69-79) 187/77     Weight: 61.8 kg (136 lb 3.9 oz)  Body mass index is 25.74 kg/m².    Intake/Output Summary (Last 24 hours) at 12/18/2022 2142  Last data filed at 12/18/2022 0007  Gross per 24 hour   Intake --   Output 250 ml   Net -250 ml      Physical Exam  Vitals and nursing note reviewed.   Pulmonary:      Effort: No respiratory distress.   Neurological:      Mental Status: She is alert and oriented to person, place, and time.   Psychiatric:         Mood and Affect: Mood normal.       Significant Imaging: I have reviewed all pertinent imaging results/findings within the past 24 hours.

## 2022-12-19 NOTE — ASSESSMENT & PLAN NOTE
Hold beta blocker due to bradycardia  -hx of labile BP noted    IV hydralazine with parameters   Possible doxazosin based upon BP per cardiology  Monitor blood pressure  -ARB resumed on 12/16/22 for control     12/18   sbp 170-180   Increase losartan to 50 mg BID

## 2022-12-19 NOTE — CHAPLAIN
On a routine visit I found the patient open to have a conversation with a .     During our time together the patient update the  on what was currently happening in regards to their health and was able to share about their life and family support.       Patient is involved with [employer] and her daughter Monik plays the piano there.  Her   two years ago and she misses him.  She is full of energy, enthusiasm about life and pleasant.   She is coping with health challenges very well.     We were able to close our time together by reciting [address] and praying.      Kin Genaro  [phone]

## 2022-12-19 NOTE — ASSESSMENT & PLAN NOTE
Likely multifactorial due to bradycardia, orthostatic hypotension.   Neurology consulted; Dr. Frederick- EEG showed no evidence of seizure activity   Mentation improved to baseline, PT/OT rec SNF on discharge. Pt accepted to SNF on discharge  Outpatient followup with neurology and cardiology on discharge

## 2022-12-19 NOTE — DISCHARGE SUMMARY
[facility] - Telemetry ([facility])  [facility] Medicine  Discharge Summary      Patient Name: Leslie Wood  MRN: [id number]  BECKY: [id number]  Patient Class: IP- Inpatient  Admission Date: 12/15/2022  Hospital Length of Stay: 3 days  Discharge Date and Time: No discharge date for patient encounter.  Attending Physician: Rosana Nino MD   Discharging Provider: Rosana Nino MD  Primary Care Provider: Angeles Carson MD    Primary Care Team: [facility] MEDICINE A    HPI:   The patient is 83 yo female with past medical history of arthritis, GERD, CAD, MI, hypertension, COVID, cardiomyopathy and stroke who presented to the ED with multiple syncopal episodes. The patient is at [facility] for [facility] care due to a significant fall in November. She has had 2 episodes  this week. This morning she was sitting at the table when she  suddenly lost consciousness. She denied chest pain, SOB, lightheadedness, or palpitations. She was noted to have heart rate in the 40s. She was orthostatic by blood pressure. Cardiology was consulted as her medications were recently adjusted. Patient feels she is at her baseline. Her syncopal episodes have all occurred at  rest while seated. Echo pending. Hospital medicine consulted for continuation of care. Patient placed in observation.      * No surgery found *      Hospital Course:   Pt admitted to Observation for Syncope in the setting Symptomatic bradycardia. Cardiology consulted and B-blocker and antihypertensive regimen adjusted. Neurology consulted for eval of syncope.        Goals of Care Treatment Preferences:  Code Status: Full Code      Consults:   Consults (From admission, onward)        Status Ordering Provider     Inpatient consult to Social Work  Once        Provider:  (Not yet assigned)    Completed BUBBA PIZARRO     Inpatient virtual consult to Hospital Medicine  Once        Provider:  Elsa Thomson NP    Completed BUBBA PIZARRO     Inpatient  consult to Social Work/Case Management  Once        Provider:  (Not yet assigned)    Completed ROSALBA WESTFALL     Inpatient consult to Neurology  Once        Provider:  Gene Frederick MD    Completed HENRY BAUGH     IP consult to case management  Once        Provider:  (Not yet assigned)    Completed HENRY BAUGH     Inpatient consult to Cardiology  Once        Provider:  Jaqui Granados MD    Completed DARWIN LUONG          * Syncope and collapse  Likely multifactorial due to bradycardia, orthostatic hypotension.   Neurology consulted; Dr. Frederick- EEG showed no evidence of seizure activity   Mentation improved to baseline, PT/OT rec SNF on discharge. Pt accepted to SNF on discharge  Outpatient followup with neurology and cardiology on discharge       Bradycardia  Cardiology consulted- Lopressor dose decreased per cardiology and able to tolerate without further syncope or dizziness. Discharge medications and discharge plan discussed with cardiology. Pt will followup in cardiology clinic for further management and will need outpatient cardiac monitor on discharge     Altered mental state  - resolved, CT head neg, UA neg for UTI, CXR unremarkable. TSH, ammonia, cortison wnl   - Neurology consulted as above. 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 Pt is awake, alert, oriented x 3, no focal deficits on exam. Discussed with cardiology- continue lopressor at decreased dose, add nifedipine for BP control. EEG with no seizures. Discharge plan discussed with daughter, all questions answered to her satisfaction. Pt appears stable for transfer to SNF per my exam today.     Follow Up:   Follow-up Information     Angeles Carson MD Follow up in 1 week(s).    Specialty: Internal Medicine  Why: call clinic to schedule appointment  Contact information:  [address] [place] [address] [address] [place] [address]  [phone]             Domingo Martins MD Follow up.    Specialties: Interventional Cardiology, Cardiology  Why: clinic will get in touch to schedule follow up appointment and to set up outpatient heart monitor  Contact information:  [address]  [place] [address]  [phone]             Gene Frederick MD Follow up in 2 week(s).    Specialty: Neurology  Contact information:  [address]  [place] [address]  [phone]                       Patient Instructions:      Ambulatory referral/consult to Neurology   Standing Status: Future   Referral Priority: Routine Referral Type: Consultation   Referral Reason: Specialty Services Required   Requested Specialty: Neurology   Number of Visits Requested: 1     Diet Cardiac     Notify your health care provider if you experience any of the following:  temperature >100.4     Notify your health care provider if you experience any of the following:  difficulty breathing or increased cough     Notify your health care provider if you experience any of the following:  persistent dizziness, light-headedness, or visual disturbances      Notify your health care provider if you experience any of the following:  increased confusion or weakness     Activity as tolerated       Significant Diagnostic Studies: See Hospital Course     Pending Diagnostic Studies:     None         Medications:  Reconciled Home Medications:      Medication List      START taking these medications    losartan 50 MG tablet  Commonly known as: COZAAR  Take 1 tablet (50 mg total) by mouth 2 (two) times a day.     NIFEdipine 30 MG Tbsr  Commonly known as: ADALAT CC  Take 1 tablet (30 mg total) by mouth once daily.        CHANGE how you take these medications    metoprolol succinate 25 MG 24 hr tablet  Commonly known as: TOPROL-XL  Take 1 tablet (25 mg total) by mouth once daily.  Start taking on: December 20, 2022  What changed:   · medication strength  · how much to take     NUCYNTA 50 mg Tab  Generic drug: tapentadoL  Take 1 tablet (50 mg total) by mouth every 8 (eight) hours as needed (severe pain). RESTART WHEN OK PER PAIN MANAGEMENT PROVIDER  What changed: additional instructions        CONTINUE taking these medications    ascorbic acid (vitamin C) 500 MG tablet  Commonly known as: VITAMIN C  Take 1 tablet by mouth every morning.     atorvastatin 80 MG tablet  Commonly known as: LIPITOR  Take 1 tablet by mouth every morning.     DULoxetine 30 MG capsule  Commonly known as: CYMBALTA  Take 30 mg by mouth once daily.     furosemide 20 MG tablet  Commonly known as: LASIX  Take 1 tablet (20 mg total) by mouth once daily.        STOP taking these medications    ramipriL 10 MG capsule  Commonly known as: ALTACE     traMADoL 50 mg tablet  Commonly known as: ULTRAM            Indwelling Lines/Drains at time of discharge:   Lines/Drains/Airways     Drain  Duration           Female External Urinary Catheter 12/15/22 1400 4 days                Time spent on the discharge of patient: 40 minutes         Rosana Nino MD  Department of Hospital Medicine  '[place] - Telemetry  ([facility])

## 2022-12-19 NOTE — ASSESSMENT & PLAN NOTE
Cardiology consulted- Lopressor dose decreased per cardiology and able to tolerate without further syncope or dizziness. Discharge medications and discharge plan discussed with cardiology. Pt will followup in cardiology clinic for further management and will need outpatient cardiac monitor on discharge

## 2022-12-19 NOTE — ASSESSMENT & PLAN NOTE
- resolved, CT head neg, UA neg for UTI, CXR unremarkable. TSH, ammonia, cortison wnl   - Neurology consulted as above. EEG with gen slowing, no seizures

## 2022-12-20 ENCOUNTER — TELEPHONE (OUTPATIENT)
Dept: NEUROLOGY | Facility: CLINIC | Age: 82
End: 2022-12-20
Payer: MEDICARE

## 2022-12-20 NOTE — TELEPHONE ENCOUNTER
Spoke with Pearl in regards to scheduling appointment for Ms. Nelson. Patient has been scheduled with KARINA garcialable 02/23/23. Pt has also been added to the waiting list in case of sooner appt. Pearl verbalized understanding.

## 2022-12-27 ENCOUNTER — PATIENT OUTREACH (OUTPATIENT)
Dept: ADMINISTRATIVE | Facility: CLINIC | Age: 82
End: 2022-12-27
Payer: MEDICARE

## 2022-12-27 NOTE — PROGRESS NOTES
C3 nurse attempted to contact Leslie Wood for a TCC post acute discharge follow up call. No answer. No voicemail available.The patient does not have a scheduled post acute appointment. Message sent to PCP staff for assistance with scheduling visit with patient.

## 2022-12-31 ENCOUNTER — EXTERNAL CHRONIC CARE MANAGEMENT (OUTPATIENT)
Dept: PRIMARY CARE CLINIC | Facility: CLINIC | Age: 82
End: 2022-12-31
Payer: MEDICARE

## 2022-12-31 PROCEDURE — 99490 PR CHRONIC CARE MGMT, 1ST 20 MIN: ICD-10-PCS | Mod: S$PBB,,, | Performed by: FAMILY MEDICINE

## 2022-12-31 PROCEDURE — 99490 CHRNC CARE MGMT STAFF 1ST 20: CPT | Mod: PBBFAC | Performed by: FAMILY MEDICINE

## 2022-12-31 PROCEDURE — 99490 CHRNC CARE MGMT STAFF 1ST 20: CPT | Mod: S$PBB,,, | Performed by: FAMILY MEDICINE

## 2023-01-26 ENCOUNTER — PES CALL (OUTPATIENT)
Dept: ADMINISTRATIVE | Facility: CLINIC | Age: 83
End: 2023-01-26
Payer: MEDICARE

## 2023-01-30 RX ORDER — METOPROLOL SUCCINATE 25 MG/1
25 TABLET, EXTENDED RELEASE ORAL DAILY
Qty: 30 TABLET | Refills: 0 | Status: CANCELLED
Start: 2023-01-30 | End: 2024-01-30

## 2023-01-31 ENCOUNTER — OFFICE VISIT (OUTPATIENT)
Dept: CARDIOLOGY | Facility: CLINIC | Age: 83
End: 2023-01-31
Payer: MEDICARE

## 2023-01-31 ENCOUNTER — EXTERNAL CHRONIC CARE MANAGEMENT (OUTPATIENT)
Dept: PRIMARY CARE CLINIC | Facility: CLINIC | Age: 83
End: 2023-01-31
Payer: MEDICARE

## 2023-01-31 VITALS
WEIGHT: 132.25 LBS | HEART RATE: 78 BPM | SYSTOLIC BLOOD PRESSURE: 142 MMHG | HEIGHT: 61 IN | OXYGEN SATURATION: 98 % | DIASTOLIC BLOOD PRESSURE: 74 MMHG | BODY MASS INDEX: 24.97 KG/M2

## 2023-01-31 DIAGNOSIS — E11.9 TYPE 2 DIABETES MELLITUS WITHOUT COMPLICATION, WITHOUT LONG-TERM CURRENT USE OF INSULIN: Chronic | ICD-10-CM

## 2023-01-31 DIAGNOSIS — I10 ESSENTIAL HYPERTENSION: ICD-10-CM

## 2023-01-31 DIAGNOSIS — F41.9 ANXIETY: ICD-10-CM

## 2023-01-31 DIAGNOSIS — Q24.5 CORONARY-MYOCARDIAL BRIDGE: ICD-10-CM

## 2023-01-31 DIAGNOSIS — I51.81 STRESS-INDUCED CARDIOMYOPATHY: ICD-10-CM

## 2023-01-31 DIAGNOSIS — Z78.9 STATIN INTOLERANCE: ICD-10-CM

## 2023-01-31 DIAGNOSIS — E78.5 HYPERLIPIDEMIA LDL GOAL <100: Chronic | ICD-10-CM

## 2023-01-31 DIAGNOSIS — Z01.810 PREOP CARDIOVASCULAR EXAM: Primary | ICD-10-CM

## 2023-01-31 PROCEDURE — 99439 PR CHRONIC CARE MGMT, EA ADDTL 20 MIN: ICD-10-PCS | Mod: S$PBB,,, | Performed by: FAMILY MEDICINE

## 2023-01-31 PROCEDURE — 99490 CHRNC CARE MGMT STAFF 1ST 20: CPT | Mod: PBBFAC | Performed by: FAMILY MEDICINE

## 2023-01-31 PROCEDURE — 99215 PR OFFICE/OUTPT VISIT, EST, LEVL V, 40-54 MIN: ICD-10-PCS | Mod: S$PBB,,, | Performed by: INTERNAL MEDICINE

## 2023-01-31 PROCEDURE — 99439 CHRNC CARE MGMT STAF EA ADDL: CPT | Mod: PBBFAC | Performed by: FAMILY MEDICINE

## 2023-01-31 PROCEDURE — 99499 UNLISTED E&M SERVICE: CPT | Mod: S$PBB,,, | Performed by: INTERNAL MEDICINE

## 2023-01-31 PROCEDURE — 99439 CHRNC CARE MGMT STAF EA ADDL: CPT | Mod: S$PBB,,, | Performed by: FAMILY MEDICINE

## 2023-01-31 PROCEDURE — 99490 PR CHRONIC CARE MGMT, 1ST 20 MIN: ICD-10-PCS | Mod: S$PBB,,, | Performed by: FAMILY MEDICINE

## 2023-01-31 PROCEDURE — 99215 OFFICE O/P EST HI 40 MIN: CPT | Mod: S$PBB,,, | Performed by: INTERNAL MEDICINE

## 2023-01-31 PROCEDURE — 99499 RISK ADDL DX/OHS AUDIT: ICD-10-PCS | Mod: S$PBB,,, | Performed by: INTERNAL MEDICINE

## 2023-01-31 PROCEDURE — 99999 PR PBB SHADOW E&M-EST. PATIENT-LVL IV: CPT | Mod: PBBFAC,,, | Performed by: INTERNAL MEDICINE

## 2023-01-31 PROCEDURE — 99999 PR PBB SHADOW E&M-EST. PATIENT-LVL IV: ICD-10-PCS | Mod: PBBFAC,,, | Performed by: INTERNAL MEDICINE

## 2023-01-31 PROCEDURE — 99490 CHRNC CARE MGMT STAFF 1ST 20: CPT | Mod: S$PBB,,, | Performed by: FAMILY MEDICINE

## 2023-01-31 PROCEDURE — 99214 OFFICE O/P EST MOD 30 MIN: CPT | Mod: PBBFAC | Performed by: INTERNAL MEDICINE

## 2023-01-31 RX ORDER — METOPROLOL SUCCINATE 25 MG/1
25 TABLET, EXTENDED RELEASE ORAL DAILY
Qty: 90 TABLET | Refills: 3 | Status: SHIPPED | OUTPATIENT
Start: 2023-01-31 | End: 2023-09-22 | Stop reason: SDUPTHER

## 2023-01-31 NOTE — PROGRESS NOTES
Subjective:   Patient ID:  Leslie Wood is a 82 y.o. female who presents for evaluation of No chief complaint on file.  1.31.2023  She presented last month to the hospital with syncope.  Her beta-blocker dose was decreased.    And was switched from carvedilol to Toprol 25 mg daily.    In the last 2 days she has been out of her metoprolol.    She states that she is awaiting an appointment in March for evaluation for knee replacement.    She denies any more chest pain.    Her repeat echocardiogram in the hospital showed recovery of her LV EF from 30-40% to 60%.    She denies any dyspnea, chest pain, palpitations, syncope presyncope.        7.27.2022  Comes in for a follow up after recent visit to the ED  She states after she took the first dose of recently prescribed BELBUCA for back pain, she felt immediately dyspnea, resolved in the ED  She had no changes on ekg and two negative troponins with an elevated BNP of 200   She has been off the lasix, baseline BNP is normal   She does reports FUENTES NYHA 1-2 lately, but no orthopnea and no leg swelling     6.6.2022  Comes in for follow-up.  She was admitted twice to the hospital last month with chest pain.  Had 2 heart catheterization.  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  Her EF is low.  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  She denies any more chest pain.  Her memory loss has been worse as per prior visit with her daughter in the hospital.  No bleeding  Also seen Dr. Carl for 2nd opinion    Interval hx: 2/11/2021   Complains of occipital headache  No chest pain, no dyspnea  States she is doing rehab and sometimes her BP is 200   Not taking lasix. On imdur  Esr came back normal after last visit       Initial HPI: 10/1/2020 Patient 80-year-old, prediabetic, with history of hypertension, who recently had a stroke about 3 months ago and was hospitalized at [facility].   She does not recall what kind of cardiac workup she underwent while at that hospital.  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  Her left-sided weakness has improved since the stroke.  She is undergoing rehab.  And should undergo speech therapy soon as she states.  She reports compliance with her blood pressure medications, however she reports feeling dizzy when she stands up, she does not get dizzy or any other circumstances.  She states that she is not drinking enough water.  Also she complains of bilateral lower extremity mild swelling that comes on and off when she takes or not take her amlodipine.  She denies any chest pain, dyspnea, orthopnea, PND, palpitations, syncope, presyncope, bleeding.                Past Medical History:   Diagnosis Date    Arthritis     Cataract     GERD (gastroesophageal reflux disease)     Heart attack 05/18/2022    Heart attack 05/23/2022    Hyperlipidemia     Hypertension     Stroke        Past Surgical History:   Procedure Laterality Date    ADENOIDECTOMY      ADRENAL GLAND SURGERY      APPENDECTOMY      BACK SURGERY      fusion l 4-5 s 1,2,3  fusion l 2-3    CORONARY ANGIOGRAPHY N/A 5/20/2022    Procedure: ANGIOGRAM, CORONARY ARTERY;  Surgeon: Domingo Martins MD;  Location: [facility] CATH LAB;  Service: Cardiology;  Laterality: N/A;    CORONARY ANGIOGRAPHY N/A 5/24/2022    Procedure: ANGIOGRAM, CORONARY ARTERY;  Surgeon: Domingo Martins MD;  Location: [facility] CATH LAB;  Service: Cardiology;  Laterality: N/A;    EYE SURGERY      HEMORRHOID SURGERY      HERNIA REPAIR      HYSTERECTOMY      indirect lumbar decompression      percutaneous placement of interspinous extension blocker    LEFT HEART CATHETERIZATION Left 5/20/2022    Procedure: CATHETERIZATION, HEART, LEFT;  Surgeon: Domingo Martins MD;  Location: [facility] CATH LAB;  Service: Cardiology;  Laterality: Left;    TONSILLECTOMY         Social History     Tobacco Use    Smoking status: Never    Smokeless tobacco: Never    Substance Use Topics    Alcohol use: No    Drug use: No       Family History   Problem Relation Age of Onset    Heart disease Mother     Hypertension Mother     Diabetes Mother     Hypertension Father     Kidney disease Father     Breast cancer Sister        Review of Systems   Constitutional: Negative for fever and malaise/fatigue.   HENT: Negative for sore throat.    Eyes: Negative for blurred vision.   Cardiovascular: Negative for chest pain, claudication, cyanosis, dyspnea on exertion, irregular heartbeat, leg swelling, near-syncope, orthopnea, palpitations, paroxysmal nocturnal dyspnea and syncope.   Respiratory: Negative for cough, hemoptysis and shortness of breath.    Hematologic/Lymphatic: Negative for bleeding problem.   Skin: Negative for poor wound healing and rash.   Musculoskeletal: Negative for back pain and falls.   Gastrointestinal: Negative for abdominal pain.   Genitourinary: Negative for nocturia.   Neurological: Positive for headaches. Negative for light-headedness and loss of balance.   Psychiatric/Behavioral: Negative for altered mental status and substance abuse.       Current Outpatient Medications on File Prior to Visit   Medication Sig    ascorbic acid, vitamin C, (VITAMIN C) 500 MG tablet Take 1 tablet by mouth every morning.    atorvastatin (LIPITOR) 80 MG tablet Take 1 tablet by mouth every morning.    DULoxetine (CYMBALTA) 30 MG capsule Take 30 mg by mouth once daily.    furosemide (LASIX) 20 MG tablet Take 1 tablet (20 mg total) by mouth once daily.    losartan (COZAAR) 50 MG tablet Take 1 tablet (50 mg total) by mouth 2 (two) times a day.    [DISCONTINUED] metoprolol succinate (TOPROL-XL) 25 MG 24 hr tablet Take 1 tablet (25 mg total) by mouth once daily.    NIFEdipine (ADALAT CC) 30 MG TbSR Take 1 tablet (30 mg total) by mouth once daily.    NUCYNTA 50 mg Tab Take 1 tablet (50 mg total) by mouth every 8 (eight) hours as needed (severe pain). RESTART WHEN OK PER PAIN MANAGEMENT  PROVIDER (Patient not taking: Reported on 1/31/2023)    [DISCONTINUED] carvediloL (COREG) 6.25 MG tablet Take 1 tablet (6.25 mg total) by mouth 2 (two) times daily. TAKE (1) TABLET BY MOUTH 2 TIMES A DAY WITH MEALS    [DISCONTINUED] cloNIDine (CATAPRES) 0.1 MG tablet Take 1 tablet (0.1 mg total) by mouth 2 (two) times daily. To take an extra dose if BP >160/90    [DISCONTINUED] diclofenac sodium (VOLTAREN) 1 % Gel Apply 2 g topically 3 (three) times daily.    [DISCONTINUED] isosorbide mononitrate (IMDUR) 30 MG 24 hr tablet TAKE 1 TABLET BY MOUTH TWICE A DAY    [DISCONTINUED] metoprolol tartrate (LOPRESSOR) 25 MG tablet Take 1 tablet (25 mg total) by mouth 3 (three) times daily.    [DISCONTINUED] nitroGLYCERIN (NITROSTAT) 0.4 MG SL tablet Place 1 tablet (0.4 mg total) under the tongue every 5 (five) minutes as needed for Chest pain.    [DISCONTINUED] valsartan (DIOVAN) 160 MG tablet TAKE 1 TABLET BY MOUTH TWICE A DAY     No current facility-administered medications on file prior to visit.       Objective:   Objective:  Wt Readings from Last 3 Encounters:   01/31/23 60 kg (132 lb 4.4 oz)   12/19/22 62 kg (136 lb 11 oz)   12/06/22 58.1 kg (128 lb)     Temp Readings from Last 3 Encounters:   12/19/22 98.2 °F (36.8 °C)   11/15/22 98.4 °F (36.9 °C) (Oral)   11/09/22 98.3 °F (36.8 °C) (Oral)     BP Readings from Last 3 Encounters:   01/31/23 (!) 142/74   12/19/22 (S) (!) 161/70   12/06/22 (!) 159/64     Pulse Readings from Last 3 Encounters:   01/31/23 78   12/19/22 64   12/06/22 64       Physical Exam  Vitals reviewed.   Constitutional:       Appearance: She is well-developed.   HENT:      Head: Normocephalic and atraumatic.   Eyes:      General: No scleral icterus.     Conjunctiva/sclera: Conjunctivae normal.   Cardiovascular:      Rate and Rhythm: Normal rate and regular rhythm.      Pulses: Intact distal pulses.      Heart sounds: Normal heart sounds. No murmur heard.     Pulmonary:      Effort: No respiratory  distress.      Breath sounds: No wheezing or rales.   Chest:      Chest wall: No tenderness.   Abdominal:      General: Bowel sounds are normal. There is no distension.      Palpations: Abdomen is soft.      Tenderness: There is no guarding.   Musculoskeletal:         General: Normal range of motion.      Cervical back: Normal range of motion and neck supple.   Skin:     General: Skin is warm.   Neurological:      Mental Status: She is alert and oriented to person, place, and time.         Lab Results   Component Value Date    CHOL 155 05/20/2022    CHOL 166 11/10/2021    CHOL 101 (L) 09/16/2020     Lab Results   Component Value Date    HDL 45 05/20/2022    HDL 41 11/10/2021    HDL 46 09/16/2020     Lab Results   Component Value Date    LDLCALC 98.0 05/20/2022    LDLCALC 102.0 11/10/2021    LDLCALC 37.0 (L) 09/16/2020     Lab Results   Component Value Date    TRIG 60 05/20/2022    TRIG 115 11/10/2021    TRIG 90 09/16/2020     Lab Results   Component Value Date    CHOLHDL 29.0 05/20/2022    CHOLHDL 24.7 11/10/2021    CHOLHDL 45.5 09/16/2020       Chemistry        Component Value Date/Time     12/15/2022 1128    K 4.0 12/15/2022 1128     12/15/2022 1128    CO2 29 12/15/2022 1128    BUN 18 12/15/2022 1128    CREATININE 0.7 12/15/2022 1128     12/15/2022 1128        Component Value Date/Time    CALCIUM 9.7 12/15/2022 1128    ALKPHOS 80 12/15/2022 1128    AST 21 12/15/2022 1128    ALT 15 12/15/2022 1128    BILITOT 1.0 12/15/2022 1128    ESTGFRAFRICA >60 07/23/2022 2111    EGFRNONAA >60 07/23/2022 2111          Lab Results   Component Value Date    TSH 1.137 12/17/2022     Lab Results   Component Value Date    INR 1.1 05/24/2022    INR 1.0 05/24/2022    INR 1.0 05/19/2022     Lab Results   Component Value Date    WBC 8.08 12/15/2022    HGB 13.4 12/15/2022    HCT 41.6 12/15/2022    MCV 91 12/15/2022     12/15/2022     BNP  @LABRCNTIP(BNP,BNPTRIAGEBLO)@  CrCl cannot be calculated (Patient's most  recent lab result is older than the maximum 7 days allowed.).     Imaging:  ======  Results for orders placed during the hospital encounter of 03/02/20   Echo Color Flow Doppler? Yes    Narrative · Mild concentric left ventricular hypertrophy.  · Normal left ventricular systolic function. The estimated ejection   fraction is 60%.  · Normal LV diastolic function.  · Normal right ventricular systolic function.  · Mild aortic regurgitation.  · Normal central venous pressure (3 mmHg).  · Trivial pericardial effusion.        No results found for this or any previous visit.  No results found for this or any previous visit.  Results for orders placed during the hospital encounter of 05/11/17   X-Ray Chest PA And Lateral    Narrative XR CHEST PA AND LATERAL, 05/11/17 11:38:46    Clinical indication: Chest pain.    Findings:  Comparison with 05/10/2017.    Epidural leads within the dorsal thoracic spine.  Lower thoracic wedge compression fracture status post kyphoplasty.  Left upper quadrant surgical clips.    Heart size is normal.  Prominent left pericardial fat pad.    Aortic arch calcification.    Lung fields remain clear.    Impression      Stable chest x-ray.  No acute findings.      Electronically signed by: EMERY SAMAYOA MD  Date:     05/11/17  Time:    12:07      No results found for this or any previous visit.  No procedure found.    Diagnostic Results:  ECG: Reviewed  Marked sinus bradycardia with sinus arrhythmia   Abnormal ECG   When compared with ECG of 28-OCT-2020 16:09,   Criteria for Septal infarct are no longer Present   T wave inversion no longer evident in Anterior leads   Confirmed by MD RODERICK, BARB ([id number]) on 11/12/2020 9:25:42 PM       Results for orders placed during the hospital encounter of 05/24/22    Cardiac catheterization    Conclusion  · The estimated blood loss was none.  · There is severe mid LAD intramyocardial bridging improved with betablockers intra op  · There was no evidence of an acute  atherothrombotic lesion    The procedure log was documented by Documenter: Te Waller and verified by Domingo Martins MD.    Date: 5/24/2022  Time: 9:49 AM    The ASCVD Risk score (Karissa DK, et al., 2019) failed to calculate for the following reasons:    The 2019 ASCVD risk score is only valid for ages 40 to 79    The patient has a prior MI or stroke diagnosis    Assessment and Plan:   Essential hypertension  -     metoprolol succinate (TOPROL-XL) 25 MG 24 hr tablet; Take 1 tablet (25 mg total) by mouth once daily.  Dispense: 90 tablet; Refill: 3    Hyperlipidemia LDL goal <100    Coronary-myocardial bridge    Stress-induced cardiomyopathy    Type 2 diabetes mellitus without complication, without long-term current use of insulin    Statin intolerance    Anxiety    Preop cardiovascular exam      Reviewed all tests and above medical conditions with patient in detail and formulated treatment plan.  Risk factor modification discussed.   Cardiac low salt diet discussed.  Maintaining healthy weight and weight loss goals were discussed in clinic.  Hypertension controlled, continue with beta-blocker, chest pain-free  cw toprol especially periop.  Had no obstruction on her coronary catheterization last year.    Chest pain-free.  Occipital functional status.    Okay to undergo her knee surgery from cardiac standpoint    Follow up in 6 months

## 2023-02-01 ENCOUNTER — TELEPHONE (OUTPATIENT)
Dept: NEUROLOGY | Facility: CLINIC | Age: 83
End: 2023-02-01

## 2023-02-01 ENCOUNTER — HOSPITAL ENCOUNTER (OUTPATIENT)
Facility: HOSPITAL | Age: 83
Discharge: HOME | End: 2023-02-02
Attending: EMERGENCY MEDICINE | Admitting: FAMILY MEDICINE
Payer: MEDICARE

## 2023-02-01 DIAGNOSIS — R53.1 WEAKNESS: ICD-10-CM

## 2023-02-01 DIAGNOSIS — R47.1 DYSARTHRIA: Primary | ICD-10-CM

## 2023-02-01 DIAGNOSIS — R47.01 APHASIA: ICD-10-CM

## 2023-02-01 DIAGNOSIS — G45.9 TIA (TRANSIENT ISCHEMIC ATTACK): ICD-10-CM

## 2023-02-01 DIAGNOSIS — R41.82 ALTERED MENTAL STATUS, UNSPECIFIED ALTERED MENTAL STATUS TYPE: ICD-10-CM

## 2023-02-01 LAB
ALBUMIN SERPL BCP-MCNC: 3.7 G/DL (ref 3.5–5.2)
ALP SERPL-CCNC: 101 U/L (ref 55–135)
ALT SERPL W/O P-5'-P-CCNC: 27 U/L (ref 10–44)
AMMONIA PLAS-SCNC: 15 UMOL/L (ref 10–50)
ANION GAP SERPL CALC-SCNC: 11 MMOL/L (ref 8–16)
AST SERPL-CCNC: 26 U/L (ref 10–40)
BASOPHILS # BLD AUTO: 0.04 K/UL (ref 0–0.2)
BASOPHILS NFR BLD: 0.5 % (ref 0–1.9)
BILIRUB SERPL-MCNC: 0.6 MG/DL (ref 0.1–1)
BILIRUB UR QL STRIP: NEGATIVE
BNP SERPL-MCNC: 76 PG/ML (ref 0–99)
BUN SERPL-MCNC: 23 MG/DL (ref 8–23)
CALCIUM SERPL-MCNC: 10.1 MG/DL (ref 8.7–10.5)
CHLORIDE SERPL-SCNC: 108 MMOL/L (ref 95–110)
CK SERPL-CCNC: 53 U/L (ref 20–180)
CLARITY UR: CLEAR
CO2 SERPL-SCNC: 27 MMOL/L (ref 23–29)
COLOR UR: YELLOW
CREAT SERPL-MCNC: 0.8 MG/DL (ref 0.5–1.4)
DIFFERENTIAL METHOD: NORMAL
EOSINOPHIL # BLD AUTO: 0.1 K/UL (ref 0–0.5)
EOSINOPHIL NFR BLD: 1.2 % (ref 0–8)
ERYTHROCYTE [DISTWIDTH] IN BLOOD BY AUTOMATED COUNT: 13.5 % (ref 11.5–14.5)
EST. GFR  (NO RACE VARIABLE): >60 ML/MIN/1.73 M^2
GLUCOSE SERPL-MCNC: 99 MG/DL (ref 70–110)
GLUCOSE UR QL STRIP: NEGATIVE
HCT VFR BLD AUTO: 43.3 % (ref 37–48.5)
HGB BLD-MCNC: 14 G/DL (ref 12–16)
HGB UR QL STRIP: NEGATIVE
IMM GRANULOCYTES # BLD AUTO: 0.02 K/UL (ref 0–0.04)
IMM GRANULOCYTES NFR BLD AUTO: 0.3 % (ref 0–0.5)
KETONES UR QL STRIP: NEGATIVE
LEUKOCYTE ESTERASE UR QL STRIP: NEGATIVE
LYMPHOCYTES # BLD AUTO: 1.6 K/UL (ref 1–4.8)
LYMPHOCYTES NFR BLD: 20.7 % (ref 18–48)
MCH RBC QN AUTO: 29 PG (ref 27–31)
MCHC RBC AUTO-ENTMCNC: 32.3 G/DL (ref 32–36)
MCV RBC AUTO: 90 FL (ref 82–98)
MONOCYTES # BLD AUTO: 0.7 K/UL (ref 0.3–1)
MONOCYTES NFR BLD: 9.7 % (ref 4–15)
NEUTROPHILS # BLD AUTO: 5.1 K/UL (ref 1.8–7.7)
NEUTROPHILS NFR BLD: 67.6 % (ref 38–73)
NITRITE UR QL STRIP: NEGATIVE
NRBC BLD-RTO: 0 /100 WBC
PH UR STRIP: 6 [PH] (ref 5–8)
PLATELET # BLD AUTO: 211 K/UL (ref 150–450)
PMV BLD AUTO: 10.1 FL (ref 9.2–12.9)
POCT GLUCOSE: 100 MG/DL (ref 70–110)
POCT GLUCOSE: 108 MG/DL (ref 70–110)
POTASSIUM SERPL-SCNC: 4.3 MMOL/L (ref 3.5–5.1)
PROT SERPL-MCNC: 7.1 G/DL (ref 6–8.4)
PROT UR QL STRIP: NEGATIVE
RBC # BLD AUTO: 4.83 M/UL (ref 4–5.4)
SODIUM SERPL-SCNC: 146 MMOL/L (ref 136–145)
SP GR UR STRIP: 1.02 (ref 1–1.03)
TROPONIN I SERPL DL<=0.01 NG/ML-MCNC: <0.006 NG/ML (ref 0–0.03)
TSH SERPL DL<=0.005 MIU/L-ACNC: 1.17 UIU/ML (ref 0.4–4)
URN SPEC COLLECT METH UR: NORMAL
UROBILINOGEN UR STRIP-ACNC: NEGATIVE EU/DL
WBC # BLD AUTO: 7.54 K/UL (ref 3.9–12.7)

## 2023-02-01 PROCEDURE — G0378 HOSPITAL OBSERVATION PER HR: HCPCS

## 2023-02-01 PROCEDURE — 93010 ELECTROCARDIOGRAM REPORT: CPT | Mod: ,,, | Performed by: STUDENT IN AN ORGANIZED HEALTH CARE EDUCATION/TRAINING PROGRAM

## 2023-02-01 PROCEDURE — 63600175 PHARM REV CODE 636 W HCPCS: Performed by: EMERGENCY MEDICINE

## 2023-02-01 PROCEDURE — 96374 THER/PROPH/DIAG INJ IV PUSH: CPT | Mod: 59

## 2023-02-01 PROCEDURE — P9612 CATHETERIZE FOR URINE SPEC: HCPCS

## 2023-02-01 PROCEDURE — 80061 LIPID PANEL: CPT | Performed by: NURSE PRACTITIONER

## 2023-02-01 PROCEDURE — 63600175 PHARM REV CODE 636 W HCPCS: Performed by: NURSE PRACTITIONER

## 2023-02-01 PROCEDURE — 25000003 PHARM REV CODE 250: Performed by: NURSE PRACTITIONER

## 2023-02-01 PROCEDURE — 93010 EKG 12-LEAD: ICD-10-PCS | Mod: ,,, | Performed by: STUDENT IN AN ORGANIZED HEALTH CARE EDUCATION/TRAINING PROGRAM

## 2023-02-01 PROCEDURE — 82550 ASSAY OF CK (CPK): CPT | Performed by: EMERGENCY MEDICINE

## 2023-02-01 PROCEDURE — 83036 HEMOGLOBIN GLYCOSYLATED A1C: CPT | Performed by: NURSE PRACTITIONER

## 2023-02-01 PROCEDURE — 82962 GLUCOSE BLOOD TEST: CPT

## 2023-02-01 PROCEDURE — 99285 EMERGENCY DEPT VISIT HI MDM: CPT | Mod: 25

## 2023-02-01 PROCEDURE — 84484 ASSAY OF TROPONIN QUANT: CPT | Performed by: EMERGENCY MEDICINE

## 2023-02-01 PROCEDURE — 84443 ASSAY THYROID STIM HORMONE: CPT | Performed by: NURSE PRACTITIONER

## 2023-02-01 PROCEDURE — 83880 ASSAY OF NATRIURETIC PEPTIDE: CPT | Performed by: EMERGENCY MEDICINE

## 2023-02-01 PROCEDURE — 80053 COMPREHEN METABOLIC PANEL: CPT | Performed by: EMERGENCY MEDICINE

## 2023-02-01 PROCEDURE — 82140 ASSAY OF AMMONIA: CPT | Performed by: EMERGENCY MEDICINE

## 2023-02-01 PROCEDURE — 85025 COMPLETE CBC W/AUTO DIFF WBC: CPT | Performed by: EMERGENCY MEDICINE

## 2023-02-01 PROCEDURE — 96375 TX/PRO/DX INJ NEW DRUG ADDON: CPT

## 2023-02-01 PROCEDURE — 25500020 PHARM REV CODE 255: Performed by: FAMILY MEDICINE

## 2023-02-01 PROCEDURE — 81003 URINALYSIS AUTO W/O SCOPE: CPT | Performed by: EMERGENCY MEDICINE

## 2023-02-01 PROCEDURE — 93005 ELECTROCARDIOGRAM TRACING: CPT

## 2023-02-01 RX ORDER — ATORVASTATIN CALCIUM 40 MG/1
80 TABLET, FILM COATED ORAL DAILY
Status: DISCONTINUED | OUTPATIENT
Start: 2023-02-02 | End: 2023-02-02 | Stop reason: HOSPADM

## 2023-02-01 RX ORDER — FUROSEMIDE 20 MG/1
20 TABLET ORAL DAILY
Status: DISCONTINUED | OUTPATIENT
Start: 2023-02-02 | End: 2023-02-02 | Stop reason: HOSPADM

## 2023-02-01 RX ORDER — SODIUM CHLORIDE 0.9 % (FLUSH) 0.9 %
10 SYRINGE (ML) INJECTION
Status: DISCONTINUED | OUTPATIENT
Start: 2023-02-01 | End: 2023-02-02 | Stop reason: HOSPADM

## 2023-02-01 RX ORDER — INSULIN ASPART 100 [IU]/ML
0-5 INJECTION, SOLUTION INTRAVENOUS; SUBCUTANEOUS
Status: DISCONTINUED | OUTPATIENT
Start: 2023-02-01 | End: 2023-02-02 | Stop reason: HOSPADM

## 2023-02-01 RX ORDER — HYDRALAZINE HYDROCHLORIDE 20 MG/ML
10 INJECTION INTRAMUSCULAR; INTRAVENOUS
Status: COMPLETED | OUTPATIENT
Start: 2023-02-01 | End: 2023-02-01

## 2023-02-01 RX ORDER — ENOXAPARIN SODIUM 100 MG/ML
40 INJECTION SUBCUTANEOUS EVERY 24 HOURS
Status: DISCONTINUED | OUTPATIENT
Start: 2023-02-02 | End: 2023-02-02 | Stop reason: HOSPADM

## 2023-02-01 RX ORDER — IBUPROFEN 200 MG
16 TABLET ORAL
Status: DISCONTINUED | OUTPATIENT
Start: 2023-02-01 | End: 2023-02-02 | Stop reason: HOSPADM

## 2023-02-01 RX ORDER — AMOXICILLIN 250 MG
1 CAPSULE ORAL 2 TIMES DAILY
Status: DISCONTINUED | OUTPATIENT
Start: 2023-02-01 | End: 2023-02-02 | Stop reason: HOSPADM

## 2023-02-01 RX ORDER — ASPIRIN 81 MG/1
81 TABLET ORAL DAILY
Status: DISCONTINUED | OUTPATIENT
Start: 2023-02-02 | End: 2023-02-02 | Stop reason: HOSPADM

## 2023-02-01 RX ORDER — ONDANSETRON 2 MG/ML
4 INJECTION INTRAMUSCULAR; INTRAVENOUS EVERY 8 HOURS PRN
Status: DISCONTINUED | OUTPATIENT
Start: 2023-02-01 | End: 2023-02-02 | Stop reason: HOSPADM

## 2023-02-01 RX ORDER — DULOXETIN HYDROCHLORIDE 30 MG/1
30 CAPSULE, DELAYED RELEASE ORAL DAILY
Status: DISCONTINUED | OUTPATIENT
Start: 2023-02-02 | End: 2023-02-02 | Stop reason: HOSPADM

## 2023-02-01 RX ORDER — METOPROLOL SUCCINATE 25 MG/1
25 TABLET, EXTENDED RELEASE ORAL DAILY
Status: DISCONTINUED | OUTPATIENT
Start: 2023-02-02 | End: 2023-02-01

## 2023-02-01 RX ORDER — METOPROLOL SUCCINATE 25 MG/1
25 TABLET, EXTENDED RELEASE ORAL DAILY
Status: DISCONTINUED | OUTPATIENT
Start: 2023-02-01 | End: 2023-02-02 | Stop reason: HOSPADM

## 2023-02-01 RX ORDER — ACETAMINOPHEN 325 MG/1
650 TABLET ORAL EVERY 6 HOURS PRN
Status: DISCONTINUED | OUTPATIENT
Start: 2023-02-01 | End: 2023-02-02 | Stop reason: HOSPADM

## 2023-02-01 RX ORDER — IBUPROFEN 200 MG
24 TABLET ORAL
Status: DISCONTINUED | OUTPATIENT
Start: 2023-02-01 | End: 2023-02-02 | Stop reason: HOSPADM

## 2023-02-01 RX ORDER — GLUCAGON 1 MG
1 KIT INJECTION
Status: DISCONTINUED | OUTPATIENT
Start: 2023-02-01 | End: 2023-02-02 | Stop reason: HOSPADM

## 2023-02-01 RX ADMIN — ONDANSETRON 4 MG: 2 INJECTION INTRAMUSCULAR; INTRAVENOUS at 08:02

## 2023-02-01 RX ADMIN — SENNOSIDES AND DOCUSATE SODIUM 1 TABLET: 50; 8.6 TABLET ORAL at 08:02

## 2023-02-01 RX ADMIN — HYDRALAZINE HYDROCHLORIDE 10 MG: 20 INJECTION, SOLUTION INTRAMUSCULAR; INTRAVENOUS at 10:02

## 2023-02-01 RX ADMIN — IOHEXOL 100 ML: 350 INJECTION, SOLUTION INTRAVENOUS at 08:02

## 2023-02-01 RX ADMIN — ACETAMINOPHEN 650 MG: 325 TABLET ORAL at 01:02

## 2023-02-01 NOTE — ASSESSMENT & PLAN NOTE
Patient's FSGs are controlled on current medication regimen.  Last A1c reviewed-   Lab Results   Component Value Date    HGBA1C 5.3 12/15/2022     Most recent fingerstick glucose reviewed-   Recent Labs   Lab 02/01/23  1300   POCTGLUCOSE 100     Current correctional scale  Low  Maintain anti-hyperglycemic dose as follows-   Antihyperglycemics (From admission, onward)    Start     Stop Route Frequency Ordered    02/01/23 1441  insulin aspart U-100 pen 0-5 Units         -- SubQ Before meals & nightly PRN 02/01/23 1342        Hold Oral hypoglycemics while patient is in the hospital.

## 2023-02-01 NOTE — H&P
"[facility] - Emergency Dept.  [facility] Medicine  History & Physical    Patient Name: Leslie Wood  MRN: [id number]  Patient Class: OP- Observation  Admission Date: 2/1/2023  Attending Physician: Blade Walton MD   Primary Care Provider: Angeles Carson MD         Patient information was obtained from patient, relative(s), past medical records and ER records.     Subjective:     Principal Problem:TIA (transient ischemic attack)    Chief Complaint:   Chief Complaint   Patient presents with    Cerebrovascular Accident     Family called EMS due to patient waking up this morning not being able to speak, blurred vision, and urinary incontinence. Speech has since resolved. Patient last known well yesterday. Hx of strokes.         HPI:     Ms. Wood is a 82 year old female with a history of GERD, CAD, MI, hypertension, cardiomyopathy and CVA in 2020 who presented to the ED for eval of worsening confusion, aphasia that family noticed around 0530 this a (last known well time 1/31 approx 2100).  Per daughter at bedside, yesterday patient was in her normal state of health, went to her cardiology appointment and last night patient was noted to be a little confused, but family thought that was because she had a long day.  This am, when daughter went into patients room she found her mother sitting up with a "blank stare on her face", speech was slurred and she noticed she had urinated on herself.  Per [facility] who arrived around 0900, pt was able to talk, but she was still confused and had an abnormal finger to nose test on the R. Lab work in the ED unremarkable, UA with no signs of infection, chest xary with no signs of infection.  CT with chronic microvascular changes but no acute intracranial processes. Patient complains of a headache, she denies any N/V/D, fever/chills, SOB.  She will be admitted to observation for further work-up of AMS/TIA.  MRI/MRA unable to be done d/t nerve stimulator that is not compatible with MRI. Neurology " consulted.      Code Status Full   Surrogate decision maker daughter Monik      Past Medical History:   Diagnosis Date    Arthritis     Cataract     GERD (gastroesophageal reflux disease)     Heart attack 05/18/2022    Heart attack 05/23/2022    Hyperlipidemia     Hypertension     Stroke        Past Surgical History:   Procedure Laterality Date    ADENOIDECTOMY      ADRENAL GLAND SURGERY      APPENDECTOMY      BACK SURGERY      fusion l 4-5 s 1,2,3  fusion l 2-3    CORONARY ANGIOGRAPHY N/A 5/20/2022    Procedure: ANGIOGRAM, CORONARY ARTERY;  Surgeon: Domingo Martins MD;  Location: [facility] CATH LAB;  Service: Cardiology;  Laterality: N/A;    CORONARY ANGIOGRAPHY N/A 5/24/2022    Procedure: ANGIOGRAM, CORONARY ARTERY;  Surgeon: Domingo Martins MD;  Location: [facility] CATH LAB;  Service: Cardiology;  Laterality: N/A;    EYE SURGERY      HEMORRHOID SURGERY      HERNIA REPAIR      HYSTERECTOMY      indirect lumbar decompression      percutaneous placement of interspinous extension blocker    LEFT HEART CATHETERIZATION Left 5/20/2022    Procedure: CATHETERIZATION, HEART, LEFT;  Surgeon: Domingo Martins MD;  Location: [facility] CATH LAB;  Service: Cardiology;  Laterality: Left;    TONSILLECTOMY         Review of patient's allergies indicates:   Allergen Reactions    Acetaminophen     Amitriptyline     Hydrochlorothiazide      Causes muscle cramping    Lisinopril      hyperkalemia    Oxycodone     Percocet [oxycodone-acetaminophen] Other (See Comments)     Seizures    Belbuca [buprenorphine hcl] Nausea And Vomiting and Other (See Comments)     Black out     Codeine Nausea Only and Rash    Prazosin Other (See Comments)     dizziness       No current facility-administered medications on file prior to encounter.     Current Outpatient Medications on File Prior to Encounter   Medication Sig    ascorbic acid, vitamin C, (VITAMIN C) 500 MG tablet Take 1 tablet by mouth every morning.    atorvastatin  (LIPITOR) 80 MG tablet Take 1 tablet by mouth every morning.    DULoxetine (CYMBALTA) 30 MG capsule Take 30 mg by mouth once daily.    furosemide (LASIX) 20 MG tablet Take 1 tablet (20 mg total) by mouth once daily.    losartan (COZAAR) 50 MG tablet Take 1 tablet (50 mg total) by mouth 2 (two) times a day.    metoprolol succinate (TOPROL-XL) 25 MG 24 hr tablet Take 1 tablet (25 mg total) by mouth once daily.    NIFEdipine (ADALAT CC) 30 MG TbSR Take 1 tablet (30 mg total) by mouth once daily.    NUCYNTA 50 mg Tab Take 1 tablet (50 mg total) by mouth every 8 (eight) hours as needed (severe pain). RESTART WHEN OK PER PAIN MANAGEMENT PROVIDER (Patient not taking: Reported on 1/31/2023)    [DISCONTINUED] carvediloL (COREG) 6.25 MG tablet Take 1 tablet (6.25 mg total) by mouth 2 (two) times daily. TAKE (1) TABLET BY MOUTH 2 TIMES A DAY WITH MEALS    [DISCONTINUED] cloNIDine (CATAPRES) 0.1 MG tablet Take 1 tablet (0.1 mg total) by mouth 2 (two) times daily. To take an extra dose if BP >160/90    [DISCONTINUED] diclofenac sodium (VOLTAREN) 1 % Gel Apply 2 g topically 3 (three) times daily.    [DISCONTINUED] isosorbide mononitrate (IMDUR) 30 MG 24 hr tablet TAKE 1 TABLET BY MOUTH TWICE A DAY    [DISCONTINUED] metoprolol tartrate (LOPRESSOR) 25 MG tablet Take 1 tablet (25 mg total) by mouth 3 (three) times daily.    [DISCONTINUED] nitroGLYCERIN (NITROSTAT) 0.4 MG SL tablet Place 1 tablet (0.4 mg total) under the tongue every 5 (five) minutes as needed for Chest pain.    [DISCONTINUED] valsartan (DIOVAN) 160 MG tablet TAKE 1 TABLET BY MOUTH TWICE A DAY     Family History       Problem Relation (Age of Onset)    Breast cancer Sister    Diabetes Mother    Heart disease Mother    Hypertension Mother, Father    Kidney disease Father          Tobacco Use    Smoking status: Never    Smokeless tobacco: Never   Substance and Sexual Activity    Alcohol use: No    Drug use: No    Sexual activity: Not Currently      Review of Systems   Neurological:  Positive for tremors, speech difficulty and weakness.   Psychiatric/Behavioral:  Positive for confusion.    Objective:     Vital Signs (Most Recent):  Temp: 98 °F (36.7 °C) (02/01/23 1303)  Pulse: 65 (02/01/23 1318)  Resp: 20 (02/01/23 1043)  BP: (!) 214/96 (02/01/23 1318)  SpO2: 96 % (02/01/23 1318)   Vital Signs (24h Range):  Temp:  [98 °F (36.7 °C)] 98 °F (36.7 °C)  Pulse:  [63-72] 65  Resp:  [16-20] 20  SpO2:  [96 %-100 %] 96 %  BP: (170-214)/(83-96) 214/96     Weight: 60 kg (132 lb 4.4 oz)  Body mass index is 24.99 kg/m².    Physical Exam  Vitals and nursing note reviewed.   Cardiovascular:      Rate and Rhythm: Normal rate and regular rhythm.      Pulses: Normal pulses.      Heart sounds: Normal heart sounds.   Pulmonary:      Effort: Pulmonary effort is normal.      Breath sounds: Normal breath sounds.   Abdominal:      General: Bowel sounds are normal. There is no distension.      Palpations: Abdomen is soft.      Tenderness: There is no abdominal tenderness.   Musculoskeletal:         General: No swelling.      Comments: Right leg weakness and decrease ROM, per family chronic after fall and knee fracture   Skin:     General: Skin is warm and dry.   Neurological:      Mental Status: She is alert.      Comments: Oriented to person, follows commands, doesn't answer questions approprietly, mood is liable, BUE intention tremor            Significant Labs: All pertinent labs within the past 24 hours have been reviewed.    Significant Imaging: I have reviewed all pertinent imaging results/findings within the past 24 hours.    Assessment/Plan:     * TIA (transient ischemic attack)  - hx of prior right occipital lobe stroke in 2020 with visual deficits  - previously worked up during hospitalization in 12/2022 for sudden loss of awareness and confusion, per neurology, Dr. Frederick; EEG with no seizure activity, events were thought to be more related to orthostasis   - CT head  today with chronic microvascular ischemic changes  - MRI/MRA unable to be done d/t incompatible nerve stimulator  - start ASA (previously on, but since d/c from [facility] has not taken)  - continue home statin  - check A1C and lipid panel  - Echo in 12/2022 with normal EF and no valvular abnormalities  - carotid dopplers pending   - neuro checks  - neurology consulted for any further recs    Type 2 diabetes mellitus without complication, without long-term current use of insulin  Patient's FSGs are controlled on current medication regimen.  Last A1c reviewed-   Lab Results   Component Value Date    HGBA1C 5.3 12/15/2022     Most recent fingerstick glucose reviewed-   Recent Labs   Lab 02/01/23  1300   POCTGLUCOSE 100     Current correctional scale  Low  Maintain anti-hyperglycemic dose as follows-   Antihyperglycemics (From admission, onward)    Start     Stop Route Frequency Ordered    02/01/23 1441  insulin aspart U-100 pen 0-5 Units         -- SubQ Before meals & nightly PRN 02/01/23 1342        Hold Oral hypoglycemics while patient is in the hospital.    Hyperlipidemia LDL goal <100  - continue home statin       Chronic back pain  - nerve stimulator in place      Anxiety and depression  - continue home Cymbalta         Essential hypertension  - followed by cardiology as outpatient last seen on 1/31  - continue home toprol and lasix         VTE Risk Mitigation (From admission, onward)         Ordered     enoxaparin injection 40 mg  Daily         02/01/23 1241     IP VTE HIGH RISK PATIENT  Once         02/01/23 1241     Place sequential compression device  Until discontinued         02/01/23 1241                   Karen Driver NP  Department of Hospital Medicine   '[facility] - Emergency Dept.

## 2023-02-01 NOTE — PHARMACY MED REC
"Admission Medication History     The home medication history was taken by Yvan Barragan.    You may go to "Admission" then "Reconcile Home Medications" tabs to review and/or act upon these items.     The home medication list has been updated by the Pharmacy department.   Please read ALL comments highlighted in yellow.   Please address this information as you see fit.    Feel free to contact us if you have any questions or require assistance.      Medications listed below were obtained from: Analytic software- [employer] and Medical records  (Not in a hospital admission)      Yvan Barragan  [id number]    Current Outpatient Medications on File Prior to Encounter   Medication Sig Dispense Refill Last Dose    ascorbic acid, vitamin C, (VITAMIN C) 500 MG tablet Take 1 tablet by mouth every morning.       atorvastatin (LIPITOR) 80 MG tablet Take 1 tablet by mouth every morning.       DULoxetine (CYMBALTA) 30 MG capsule Take 30 mg by mouth once daily.       furosemide (LASIX) 20 MG tablet Take 1 tablet (20 mg total) by mouth once daily. 30 tablet 11     losartan (COZAAR) 50 MG tablet Take 1 tablet (50 mg total) by mouth 2 (two) times a day. 30 tablet 0     metoprolol succinate (TOPROL-XL) 25 MG 24 hr tablet Take 1 tablet (25 mg total) by mouth once daily. 90 tablet 3     NIFEdipine (ADALAT CC) 30 MG TbSR Take 1 tablet (30 mg total) by mouth once daily. 30 tablet 0     NUCYNTA 50 mg Tab Take 1 tablet (50 mg total) by mouth every 8 (eight) hours as needed (severe pain). RESTART WHEN OK PER PAIN MANAGEMENT PROVIDER (Patient not taking: Reported on 1/31/2023) 1 tablet 0                          .        "

## 2023-02-01 NOTE — ED PROVIDER NOTES
SCRIBE #1 NOTE: I, Lisa Hendricks, am scribing for, and in the presence of, Jonah Perez MD. I have scribed the entire note.      History      Chief Complaint   Patient presents with    Cerebrovascular Accident     Family called EMS due to patient waking up this morning not being able to speak, blurred vision, and urinary incontinence. Speech has since resolved. Patient last known well yesterday. Hx of strokes.        Review of patient's allergies indicates:   Allergen Reactions    Acetaminophen     Amitriptyline     Hydrochlorothiazide      Causes muscle cramping    Lisinopril      hyperkalemia    Oxycodone     Percocet [oxycodone-acetaminophen] Other (See Comments)     Seizures    Belbuca [buprenorphine hcl] Nausea And Vomiting and Other (See Comments)     Black out     Codeine Nausea Only and Rash    Prazosin Other (See Comments)     dizziness        HPI   HPI    2/1/2023, 9:42 AM   History obtained from the [facility]  HPI/ROS limited secondary to mental status change      History of Present Illness: Leslie Wood is a 82 y.o. female patient with a PMHx of heart attack, HLD, HTN, and stroke who presents to the Emergency Department for an evaluation of worsening confusion. Per [facility], the pt's family reported that the pt's last known normal is yesterday morning because the pt normally gets more confused and starts to have slurred speech in the evening time before she goes to sleep, so they are unsure if she became abnormal before bed last night. This morning, at 0530, the pt's family noticed that the pt had urinary incontinence and could not speak which is abnormal for the pt. [facility] was eventually called and arrived on scene at 0900. When [facility] arrived on scene, they report that the pt was able to talk, but she was still confused and had an abnormal finger to nose test on the R. En route to the ER, [facility] reports that the pt started to complain of blurry vision and soreness to the R-side of her body, but occassionally  she would complain of soreness to the L-side of her body.        Arrival mode: EMS    PCP: Angeles Carson MD       Past Medical History:  Past Medical History:   Diagnosis Date    Arthritis     Cataract     GERD (gastroesophageal reflux disease)     Heart attack 05/18/2022    Heart attack 05/23/2022    Hyperlipidemia     Hypertension     Stroke        Past Surgical History:  Past Surgical History:   Procedure Laterality Date    ADENOIDECTOMY      ADRENAL GLAND SURGERY      APPENDECTOMY      BACK SURGERY      fusion l 4-5 s 1,2,3  fusion l 2-3    CORONARY ANGIOGRAPHY N/A 5/20/2022    Procedure: ANGIOGRAM, CORONARY ARTERY;  Surgeon: Domingo Martins MD;  Location: [facility] CATH LAB;  Service: Cardiology;  Laterality: N/A;    CORONARY ANGIOGRAPHY N/A 5/24/2022    Procedure: ANGIOGRAM, CORONARY ARTERY;  Surgeon: Domingo Martins MD;  Location: [facility] CATH LAB;  Service: Cardiology;  Laterality: N/A;    EYE SURGERY      HEMORRHOID SURGERY      HERNIA REPAIR      HYSTERECTOMY      indirect lumbar decompression      percutaneous placement of interspinous extension blocker    LEFT HEART CATHETERIZATION Left 5/20/2022    Procedure: CATHETERIZATION, HEART, LEFT;  Surgeon: Domingo Martins MD;  Location: [facility] CATH LAB;  Service: Cardiology;  Laterality: Left;    TONSILLECTOMY           Family History:  Family History   Problem Relation Age of Onset    Heart disease Mother     Hypertension Mother     Diabetes Mother     Hypertension Father     Kidney disease Father     Breast cancer Sister        Social History:  Social History     Tobacco Use    Smoking status: Never    Smokeless tobacco: Never   Substance and Sexual Activity    Alcohol use: No    Drug use: No    Sexual activity: Not Currently       ROS   Review of Systems   Unable to perform ROS: Mental status change     Physical Exam      Initial Vitals [02/01/23 0953]   BP Pulse Resp Temp SpO2   (!) 170/83 72 16 98 °F (36.7 °C) 98 %      MAP       --          Physical  Exam  Nursing Notes and Vital Signs Reviewed.  Constitutional: Patient is in no acute distress. Elderly. Frail.  Head: Atraumatic. Normocephalic.  Eyes: PERRL. EOM intact. Conjunctivae are not pale. No scleral icterus.  ENT: Mucous membranes are moist. Oropharynx is clear and symmetric.    Neck: Supple. Full ROM. No lymphadenopathy.  Cardiovascular: Regular rate. Regular rhythm. No murmurs, rubs, or gallops. Distal pulses are 2+ and symmetric.  Pulmonary/Chest: No respiratory distress. Clear to auscultation bilaterally. No wheezing or rales.  Abdominal: Soft and non-distended.  There is no tenderness.  No rebound, guarding, or rigidity.   Musculoskeletal: Moves all extremities. No obvious deformities. No edema.  Skin: Warm and dry.  Neurological:  Dysmetria on the R. The pt is confused.  Psychiatric: Normal affect. Good eye contact. Appropriate in content.    ED Course    Procedures  ED Vital Signs:  Vitals:    02/01/23 0953 02/01/23 1031 02/01/23 1043 02/01/23 1103   BP: (!) 170/83  (!) 202/89 (!) 188/88   Pulse: 72 66 63 72   Resp: 16  20    Temp: 98 °F (36.7 °C)      TempSrc: Oral      SpO2: 98%  99% 100%       Abnormal Lab Results:  Labs Reviewed   COMPREHENSIVE METABOLIC PANEL - Abnormal; Notable for the following components:       Result Value    Sodium 146 (*)     All other components within normal limits   CBC W/ AUTO DIFFERENTIAL   URINALYSIS, REFLEX TO URINE CULTURE    Narrative:     Specimen Source->Urine   B-TYPE NATRIURETIC PEPTIDE   CK   TROPONIN I   AMMONIA        All Lab Results:  Results for orders placed or performed during the hospital encounter of 02/01/23   CBC Auto Differential   Result Value Ref Range    WBC 7.54 3.90 - 12.70 K/uL    RBC 4.83 4.00 - 5.40 M/uL    Hemoglobin 14.0 12.0 - 16.0 g/dL    Hematocrit 43.3 37.0 - 48.5 %    MCV 90 82 - 98 fL    MCH 29.0 27.0 - 31.0 pg    MCHC 32.3 32.0 - 36.0 g/dL    RDW 13.5 11.5 - 14.5 %    Platelets 211 150 - 450 K/uL    MPV 10.1 9.2 - 12.9 fL     Immature Granulocytes 0.3 0.0 - 0.5 %    Gran # (ANC) 5.1 1.8 - 7.7 K/uL    Immature Grans (Abs) 0.02 0.00 - 0.04 K/uL    Lymph # 1.6 1.0 - 4.8 K/uL    Mono # 0.7 0.3 - 1.0 K/uL    Eos # 0.1 0.0 - 0.5 K/uL    Baso # 0.04 0.00 - 0.20 K/uL    nRBC 0 0 /100 WBC    Gran % 67.6 38.0 - 73.0 %    Lymph % 20.7 18.0 - 48.0 %    Mono % 9.7 4.0 - 15.0 %    Eosinophil % 1.2 0.0 - 8.0 %    Basophil % 0.5 0.0 - 1.9 %    Differential Method Automated    Comprehensive Metabolic Panel   Result Value Ref Range    Sodium 146 (H) 136 - 145 mmol/L    Potassium 4.3 3.5 - 5.1 mmol/L    Chloride 108 95 - 110 mmol/L    CO2 27 23 - 29 mmol/L    Glucose 99 70 - 110 mg/dL    BUN 23 8 - 23 mg/dL    Creatinine 0.8 0.5 - 1.4 mg/dL    Calcium 10.1 8.7 - 10.5 mg/dL    Total Protein 7.1 6.0 - 8.4 g/dL    Albumin 3.7 3.5 - 5.2 g/dL    Total Bilirubin 0.6 0.1 - 1.0 mg/dL    Alkaline Phosphatase 101 55 - 135 U/L    AST 26 10 - 40 U/L    ALT 27 10 - 44 U/L    Anion Gap 11 8 - 16 mmol/L    eGFR >60 >60 mL/min/1.73 m^2   Urinalysis, Reflex to Urine Culture Urine, Catheterized    Specimen: Urine   Result Value Ref Range    Specimen UA Urine, Catheterized     Color, UA Yellow Yellow, Straw, Izabela    Appearance, UA Clear Clear    pH, UA 6.0 5.0 - 8.0    Specific Gravity, UA 1.020 1.005 - 1.030    Protein, UA Negative Negative    Glucose, UA Negative Negative    Ketones, UA Negative Negative    Bilirubin (UA) Negative Negative    Occult Blood UA Negative Negative    Nitrite, UA Negative Negative    Urobilinogen, UA Negative <2.0 EU/dL    Leukocytes, UA Negative Negative   BNP   Result Value Ref Range    BNP 76 0 - 99 pg/mL   CK   Result Value Ref Range    CPK 53 20 - 180 U/L   Troponin I   Result Value Ref Range    Troponin I <0.006 0.000 - 0.026 ng/mL   Ammonia   Result Value Ref Range    Ammonia 15 10 - 50 umol/L         Imaging Results:  Imaging Results              X-Ray Chest AP Portable (Final result)  Result time 02/01/23 10:44:43      Final result by  BHARATI Neff Sr., MD (02/01/23 10:44:43)                   Impression:      1. There is opacification of the left costophrenic angle.  This is characteristic of a pleural effusion.  2. There is no focal pulmonary infiltrate visualized.  3. There is marked narrowing of the right subacromial space.  This is characteristic of a rotator cuff tear.  4. Surgical changes  .      Electronically signed by: Luis Neff MD  Date:    02/01/2023  Time:    10:44               Narrative:    EXAMINATION:  XR CHEST AP PORTABLE    CLINICAL HISTORY:  weakness;    COMPARISON:  12/15/2022    FINDINGS:  The size of the heart is normal.  There is no focal pulmonary infiltrate visualized.  There is opacification of the left costophrenic angle.  The right costophrenic angle is sharp.  There is no pneumothorax.  There are 2 electrodes projected over the thoracic spine.  There are vertebroplasty changes in the inferior aspect of the thoracic spine.  There is marked narrowing of the right subacromial space.  There are surgical clips projected over the left upper quadrant of the abdomen.                                       CT Head Without Contrast (Final result)  Result time 02/01/23 10:21:43      Final result by Daniel Penn MD (02/01/23 10:21:43)                   Impression:      Chronic microvascular ischemic changes.    All CT scans at this facility use dose modulation, iterative reconstruction, and/or weight based dosing when appropriate to reduce radiation dose to as low as reasonable achievable.      Electronically signed by: Daniel Penn MD  Date:    02/01/2023  Time:    10:21               Narrative:    EXAMINATION:  CT HEAD WITHOUT CONTRAST    CLINICAL HISTORY:  Mental status change, unknown cause;    TECHNIQUE:  Low dose axial CT images obtained throughout the head without intravenous contrast. Sagittal and coronal reconstructions were performed.    COMPARISON:  12/16/2022    FINDINGS:  Intracranial  compartment:    The brain parenchyma demonstrates areas of decreased attenuation with moderate periventricular white matter consistent with chronic microvascular ischemic changes.  Large area of decreased attenuation within the right occipital region within the posterior cerebral artery distribution consistent with a remote infarct..  No parenchymal mass, hemorrhage, edema or major vascular distribution infarct.  Vascular calcifications are noted.    Mild prominence of the sulci and ventricles are consistent with age-related involutional changes.    No extra-axial blood or fluid collections.    Skull/extracranial contents (limited evaluation): No fracture. Mastoid air cells and paranasal sinuses are essentially clear.                                     The EKG was ordered, reviewed, and independently interpreted by the ED provider.  Interpretation time: 10:03  Rate: 62 BPM  Rhythm: Sinus rhythm with premature atrial complexes  Interpretation: No acute ST changes. No STEMI.           The Emergency Provider reviewed the vital signs and test results, which are outlined above.    ED Discussion     11:53 AM: Discussed case with Karen Driver NP ([facility]). Dr. Walton agrees with current care and management of pt and accepts admission.   Admitting Service: Hospital Medicine   Admitting Physician: Dr. Walton  Admit to: Obs    11:54 AM: Re-evaluated pt. I have discussed test results, shared treatment plan, and the need for admission with patient and family at bedside. Pt and family express understanding at this time and agree with all information. All questions answered. Pt and family have no further questions or concerns at this time. Pt is ready for admit.           ED Medication(s):  Medications   hydrALAZINE injection 10 mg (10 mg Intravenous Given 2/1/23 1057)           New Prescriptions    No medications on file         Medical Decision Making    Medical Decision Making:   Initial Assessment:   Confusion  that has been worsening since yesterday.  Woke up with dysarthria, facial droop and right sided weakness this morning  Differential Diagnosis:   CVA, TIA, hypoglycemia  Clinical Tests:   Lab Tests: Ordered and Reviewed  Radiological Study: Ordered and Reviewed  Medical Tests: Ordered and Reviewed  ED Management:  Ct is negative for any acute findings, and labs are normal except for a sodium of 146.   Other:   I have discussed this case with another health care provider.       <> Summary of the Discussion: Case discussed with Dr. Walton ([facility] constantine carbajaly will place in observation to their service.           Scribe Attestation:   Scribe #1: I performed the above scribed service and the documentation accurately describes the services I performed. I attest to the accuracy of the note.    Attending:   Physician Attestation Statement for Scribe #1: I, Jonah Perez MD, personally performed the services described in this documentation, as scribed by Lisa Hendricks, in my presence, and it is both accurate and complete.          Clinical Impression       ICD-10-CM ICD-9-CM   1. Dysarthria  R47.1 784.51   2. Weakness  R53.1 780.79   3. Altered mental status, unspecified altered mental status type  R41.82 780.97       Disposition:   Disposition: Placed in Observation  Condition: Fair       Jonah Perez MD  02/01/23 9107

## 2023-02-01 NOTE — HPI
"    Ms. Wood is a 82 year old female with a history of GERD, CAD, MI, hypertension, cardiomyopathy and CVA in 2020 who presented to the ED for eval of worsening confusion, aphasia that family noticed around 0530 this a (last known well time 1/31 approx 2100).  Per daughter at bedside, yesterday patient was in her normal state of health, went to her cardiology appointment and last night patient was noted to be a little confused, but family thought that was because she had a long day.  This am, when daughter went into patients room she found her mother sitting up with a "blank stare on her face", speech was slurred and she noticed she had urinated on herself.  Per [facility] who arrived around 0900, pt was able to talk, but she was still confused and had an abnormal finger to nose test on the R. Lab work in the ED unremarkable, UA with no signs of infection, chest xary with no signs of infection.  CT with chronic microvascular changes but no acute intracranial processes. Patient complains of a headache, she denies any N/V/D, fever/chills, SOB.  She will be admitted to observation for further work-up of AMS/TIA.  MRI/MRA unable to be done d/t nerve stimulator that is not compatible with MRI. Neurology consulted.      Code Status Full   Surrogate decision maker daughter Monik  "

## 2023-02-01 NOTE — TELEPHONE ENCOUNTER
"5:02 pm  Secure chat sent to nurse Luis in regards to setting up pt's neuro consult @ 5:30 pm, no response by 5:23 pm. Called nurses station and was transferred to Lusi when I inquired about confirming 5:30 pm consult he stated he needed 20 mins. I then advised him that we would have to push consult to 9 am tomorrow am. Luis responded " ok" and call was disconnected.     FYI consult was called in at 1:23 pm by ED nurse Reny  "

## 2023-02-01 NOTE — SUBJECTIVE & OBJECTIVE
Past Medical History:   Diagnosis Date    Arthritis     Cataract     GERD (gastroesophageal reflux disease)     Heart attack 05/18/2022    Heart attack 05/23/2022    Hyperlipidemia     Hypertension     Stroke        Past Surgical History:   Procedure Laterality Date    ADENOIDECTOMY      ADRENAL GLAND SURGERY      APPENDECTOMY      BACK SURGERY      fusion l 4-5 s 1,2,3  fusion l 2-3    CORONARY ANGIOGRAPHY N/A 5/20/2022    Procedure: ANGIOGRAM, CORONARY ARTERY;  Surgeon: Domingo Martins MD;  Location: [facility] CATH LAB;  Service: Cardiology;  Laterality: N/A;    CORONARY ANGIOGRAPHY N/A 5/24/2022    Procedure: ANGIOGRAM, CORONARY ARTERY;  Surgeon: Domingo Martins MD;  Location: [facility] CATH LAB;  Service: Cardiology;  Laterality: N/A;    EYE SURGERY      HEMORRHOID SURGERY      HERNIA REPAIR      HYSTERECTOMY      indirect lumbar decompression      percutaneous placement of interspinous extension blocker    LEFT HEART CATHETERIZATION Left 5/20/2022    Procedure: CATHETERIZATION, HEART, LEFT;  Surgeon: Domingo Martins MD;  Location: [facility] CATH LAB;  Service: Cardiology;  Laterality: Left;    TONSILLECTOMY         Review of patient's allergies indicates:   Allergen Reactions    Acetaminophen     Amitriptyline     Hydrochlorothiazide      Causes muscle cramping    Lisinopril      hyperkalemia    Oxycodone     Percocet [oxycodone-acetaminophen] Other (See Comments)     Seizures    Belbuca [buprenorphine hcl] Nausea And Vomiting and Other (See Comments)     Black out     Codeine Nausea Only and Rash    Prazosin Other (See Comments)     dizziness       No current facility-administered medications on file prior to encounter.     Current Outpatient Medications on File Prior to Encounter   Medication Sig    ascorbic acid, vitamin C, (VITAMIN C) 500 MG tablet Take 1 tablet by mouth every morning.    atorvastatin (LIPITOR) 80 MG tablet Take 1 tablet by mouth every morning.    DULoxetine (CYMBALTA) 30 MG capsule Take 30 mg by  mouth once daily.    furosemide (LASIX) 20 MG tablet Take 1 tablet (20 mg total) by mouth once daily.    losartan (COZAAR) 50 MG tablet Take 1 tablet (50 mg total) by mouth 2 (two) times a day.    metoprolol succinate (TOPROL-XL) 25 MG 24 hr tablet Take 1 tablet (25 mg total) by mouth once daily.    NIFEdipine (ADALAT CC) 30 MG TbSR Take 1 tablet (30 mg total) by mouth once daily.    NUCYNTA 50 mg Tab Take 1 tablet (50 mg total) by mouth every 8 (eight) hours as needed (severe pain). RESTART WHEN OK PER PAIN MANAGEMENT PROVIDER (Patient not taking: Reported on 1/31/2023)    [DISCONTINUED] carvediloL (COREG) 6.25 MG tablet Take 1 tablet (6.25 mg total) by mouth 2 (two) times daily. TAKE (1) TABLET BY MOUTH 2 TIMES A DAY WITH MEALS    [DISCONTINUED] cloNIDine (CATAPRES) 0.1 MG tablet Take 1 tablet (0.1 mg total) by mouth 2 (two) times daily. To take an extra dose if BP >160/90    [DISCONTINUED] diclofenac sodium (VOLTAREN) 1 % Gel Apply 2 g topically 3 (three) times daily.    [DISCONTINUED] isosorbide mononitrate (IMDUR) 30 MG 24 hr tablet TAKE 1 TABLET BY MOUTH TWICE A DAY    [DISCONTINUED] metoprolol tartrate (LOPRESSOR) 25 MG tablet Take 1 tablet (25 mg total) by mouth 3 (three) times daily.    [DISCONTINUED] nitroGLYCERIN (NITROSTAT) 0.4 MG SL tablet Place 1 tablet (0.4 mg total) under the tongue every 5 (five) minutes as needed for Chest pain.    [DISCONTINUED] valsartan (DIOVAN) 160 MG tablet TAKE 1 TABLET BY MOUTH TWICE A DAY     Family History       Problem Relation (Age of Onset)    Breast cancer Sister    Diabetes Mother    Heart disease Mother    Hypertension Mother, Father    Kidney disease Father          Tobacco Use    Smoking status: Never    Smokeless tobacco: Never   Substance and Sexual Activity    Alcohol use: No    Drug use: No    Sexual activity: Not Currently     Review of Systems   Neurological:  Positive for tremors, speech difficulty and weakness.   Psychiatric/Behavioral:  Positive for  confusion.    Objective:     Vital Signs (Most Recent):  Temp: 98 °F (36.7 °C) (02/01/23 1303)  Pulse: 65 (02/01/23 1318)  Resp: 20 (02/01/23 1043)  BP: (!) 214/96 (02/01/23 1318)  SpO2: 96 % (02/01/23 1318)   Vital Signs (24h Range):  Temp:  [98 °F (36.7 °C)] 98 °F (36.7 °C)  Pulse:  [63-72] 65  Resp:  [16-20] 20  SpO2:  [96 %-100 %] 96 %  BP: (170-214)/(83-96) 214/96     Weight: 60 kg (132 lb 4.4 oz)  Body mass index is 24.99 kg/m².    Physical Exam  Vitals and nursing note reviewed.   Cardiovascular:      Rate and Rhythm: Normal rate and regular rhythm.      Pulses: Normal pulses.      Heart sounds: Normal heart sounds.   Pulmonary:      Effort: Pulmonary effort is normal.      Breath sounds: Normal breath sounds.   Abdominal:      General: Bowel sounds are normal. There is no distension.      Palpations: Abdomen is soft.      Tenderness: There is no abdominal tenderness.   Musculoskeletal:         General: No swelling.      Comments: Right leg weakness and decrease ROM, per family chronic after fall and knee fracture   Skin:     General: Skin is warm and dry.   Neurological:      Mental Status: She is alert.      Comments: Oriented to person, follows commands, doesn't answer questions approprietly, mood is liable, BUE intention tremor            Significant Labs: All pertinent labs within the past 24 hours have been reviewed.    Significant Imaging: I have reviewed all pertinent imaging results/findings within the past 24 hours.

## 2023-02-01 NOTE — ASSESSMENT & PLAN NOTE
- hx of prior right occipital lobe stroke in 2020 with visual deficits  - previously worked up during hospitalization in 12/2022 for sudden loss of awareness and confusion, per neurology, Dr. Frederick; EEG with no seizure activity, events were thought to be more related to orthostasis   - CT head today with chronic microvascular ischemic changes  - MRI/MRA unable to be done d/t incompatible nerve stimulator  - start ASA (previously on, but since d/c from [facility] has not taken)  - continue home statin  - check A1C and lipid panel  - Echo in 12/2022 with normal EF and no valvular abnormalities  - carotid dopplers pending   - neuro checks  - neurology consulted for any further recs

## 2023-02-02 VITALS
OXYGEN SATURATION: 94 % | RESPIRATION RATE: 20 BRPM | BODY MASS INDEX: 25.05 KG/M2 | WEIGHT: 132.69 LBS | DIASTOLIC BLOOD PRESSURE: 61 MMHG | TEMPERATURE: 98 F | HEART RATE: 55 BPM | HEIGHT: 61 IN | SYSTOLIC BLOOD PRESSURE: 137 MMHG

## 2023-02-02 PROBLEM — R47.01 APHASIA: Status: ACTIVE | Noted: 2023-02-02

## 2023-02-02 LAB
ALBUMIN SERPL BCP-MCNC: 3.5 G/DL (ref 3.5–5.2)
ALP SERPL-CCNC: 85 U/L (ref 55–135)
ALT SERPL W/O P-5'-P-CCNC: 28 U/L (ref 10–44)
ANION GAP SERPL CALC-SCNC: 11 MMOL/L (ref 8–16)
APTT BLDCRRT: 27.2 SEC (ref 21–32)
AST SERPL-CCNC: 26 U/L (ref 10–40)
BASOPHILS # BLD AUTO: 0.04 K/UL (ref 0–0.2)
BASOPHILS NFR BLD: 0.5 % (ref 0–1.9)
BILIRUB SERPL-MCNC: 0.7 MG/DL (ref 0.1–1)
BUN SERPL-MCNC: 16 MG/DL (ref 8–23)
CALCIUM SERPL-MCNC: 9.9 MG/DL (ref 8.7–10.5)
CHLORIDE SERPL-SCNC: 106 MMOL/L (ref 95–110)
CHOLEST SERPL-MCNC: 96 MG/DL (ref 120–199)
CHOLEST/HDLC SERPL: 2 {RATIO} (ref 2–5)
CK MB SERPL-MCNC: 2.1 NG/ML (ref 0.1–6.5)
CK MB SERPL-RTO: 4.7 % (ref 0–5)
CK SERPL-CCNC: 45 U/L (ref 20–180)
CO2 SERPL-SCNC: 25 MMOL/L (ref 23–29)
CREAT SERPL-MCNC: 0.8 MG/DL (ref 0.5–1.4)
DIFFERENTIAL METHOD: ABNORMAL
EOSINOPHIL # BLD AUTO: 0.2 K/UL (ref 0–0.5)
EOSINOPHIL NFR BLD: 2.9 % (ref 0–8)
ERYTHROCYTE [DISTWIDTH] IN BLOOD BY AUTOMATED COUNT: 13.7 % (ref 11.5–14.5)
EST. GFR  (NO RACE VARIABLE): >60 ML/MIN/1.73 M^2
ESTIMATED AVG GLUCOSE: 120 MG/DL (ref 68–131)
GLUCOSE SERPL-MCNC: 82 MG/DL (ref 70–110)
HBA1C MFR BLD: 5.8 % (ref 4–5.6)
HCT VFR BLD AUTO: 43.9 % (ref 37–48.5)
HDLC SERPL-MCNC: 47 MG/DL (ref 40–75)
HDLC SERPL: 49 % (ref 20–50)
HGB BLD-MCNC: 13.9 G/DL (ref 12–16)
IMM GRANULOCYTES # BLD AUTO: 0.02 K/UL (ref 0–0.04)
IMM GRANULOCYTES NFR BLD AUTO: 0.3 % (ref 0–0.5)
INR PPP: 1 (ref 0.8–1.2)
LDLC SERPL CALC-MCNC: 38.8 MG/DL (ref 63–159)
LYMPHOCYTES # BLD AUTO: 2.4 K/UL (ref 1–4.8)
LYMPHOCYTES NFR BLD: 30.7 % (ref 18–48)
MAGNESIUM SERPL-MCNC: 1.9 MG/DL (ref 1.6–2.6)
MCH RBC QN AUTO: 28.5 PG (ref 27–31)
MCHC RBC AUTO-ENTMCNC: 31.7 G/DL (ref 32–36)
MCV RBC AUTO: 90 FL (ref 82–98)
MONOCYTES # BLD AUTO: 0.9 K/UL (ref 0.3–1)
MONOCYTES NFR BLD: 10.7 % (ref 4–15)
NEUTROPHILS # BLD AUTO: 4.4 K/UL (ref 1.8–7.7)
NEUTROPHILS NFR BLD: 54.9 % (ref 38–73)
NONHDLC SERPL-MCNC: 49 MG/DL
NRBC BLD-RTO: 0 /100 WBC
PHOSPHATE SERPL-MCNC: 3.8 MG/DL (ref 2.7–4.5)
PLATELET # BLD AUTO: 215 K/UL (ref 150–450)
PMV BLD AUTO: 10.5 FL (ref 9.2–12.9)
POCT GLUCOSE: 83 MG/DL (ref 70–110)
POCT GLUCOSE: 87 MG/DL (ref 70–110)
POTASSIUM SERPL-SCNC: 4 MMOL/L (ref 3.5–5.1)
PROT SERPL-MCNC: 6.8 G/DL (ref 6–8.4)
PROTHROMBIN TIME: 10.7 SEC (ref 9–12.5)
RBC # BLD AUTO: 4.88 M/UL (ref 4–5.4)
SODIUM SERPL-SCNC: 142 MMOL/L (ref 136–145)
TRIGL SERPL-MCNC: 51 MG/DL (ref 30–150)
TROPONIN I SERPL DL<=0.01 NG/ML-MCNC: 0.01 NG/ML (ref 0–0.03)
WBC # BLD AUTO: 7.95 K/UL (ref 3.9–12.7)

## 2023-02-02 PROCEDURE — 80053 COMPREHEN METABOLIC PANEL: CPT | Performed by: NURSE PRACTITIONER

## 2023-02-02 PROCEDURE — 85610 PROTHROMBIN TIME: CPT | Performed by: NURSE PRACTITIONER

## 2023-02-02 PROCEDURE — 92610 EVALUATE SWALLOWING FUNCTION: CPT

## 2023-02-02 PROCEDURE — 83735 ASSAY OF MAGNESIUM: CPT | Performed by: NURSE PRACTITIONER

## 2023-02-02 PROCEDURE — 85025 COMPLETE CBC W/AUTO DIFF WBC: CPT | Performed by: NURSE PRACTITIONER

## 2023-02-02 PROCEDURE — 97166 OT EVAL MOD COMPLEX 45 MIN: CPT

## 2023-02-02 PROCEDURE — 97116 GAIT TRAINING THERAPY: CPT

## 2023-02-02 PROCEDURE — 84484 ASSAY OF TROPONIN QUANT: CPT | Performed by: NURSE PRACTITIONER

## 2023-02-02 PROCEDURE — G0427 PR INPT TELEHEALTH CON 70/>M: ICD-10-PCS | Mod: 95,,, | Performed by: NURSE PRACTITIONER

## 2023-02-02 PROCEDURE — G0427 INPT/ED TELECONSULT70: HCPCS | Mod: 95,,, | Performed by: NURSE PRACTITIONER

## 2023-02-02 PROCEDURE — 92523 SPEECH SOUND LANG COMPREHEN: CPT

## 2023-02-02 PROCEDURE — 82553 CREATINE MB FRACTION: CPT | Performed by: NURSE PRACTITIONER

## 2023-02-02 PROCEDURE — 97530 THERAPEUTIC ACTIVITIES: CPT

## 2023-02-02 PROCEDURE — 84100 ASSAY OF PHOSPHORUS: CPT | Performed by: NURSE PRACTITIONER

## 2023-02-02 PROCEDURE — 25000003 PHARM REV CODE 250: Performed by: NURSE PRACTITIONER

## 2023-02-02 PROCEDURE — 36415 COLL VENOUS BLD VENIPUNCTURE: CPT | Performed by: NURSE PRACTITIONER

## 2023-02-02 PROCEDURE — 85730 THROMBOPLASTIN TIME PARTIAL: CPT | Performed by: NURSE PRACTITIONER

## 2023-02-02 PROCEDURE — G0378 HOSPITAL OBSERVATION PER HR: HCPCS

## 2023-02-02 PROCEDURE — 97161 PT EVAL LOW COMPLEX 20 MIN: CPT

## 2023-02-02 RX ORDER — CLOPIDOGREL BISULFATE 75 MG/1
75 TABLET ORAL DAILY
Qty: 21 TABLET | Refills: 0 | Status: SHIPPED | OUTPATIENT
Start: 2023-02-02 | End: 2023-08-30

## 2023-02-02 RX ORDER — ASPIRIN 81 MG/1
81 TABLET ORAL DAILY
Qty: 90 TABLET | Refills: 3 | Status: SHIPPED | OUTPATIENT
Start: 2023-02-03 | End: 2024-02-15

## 2023-02-02 RX ADMIN — SENNOSIDES AND DOCUSATE SODIUM 1 TABLET: 50; 8.6 TABLET ORAL at 08:02

## 2023-02-02 RX ADMIN — ATORVASTATIN CALCIUM 80 MG: 40 TABLET, FILM COATED ORAL at 08:02

## 2023-02-02 RX ADMIN — DULOXETINE 30 MG: 30 CAPSULE, DELAYED RELEASE ORAL at 08:02

## 2023-02-02 RX ADMIN — METOPROLOL SUCCINATE 25 MG: 25 TABLET, EXTENDED RELEASE ORAL at 08:02

## 2023-02-02 RX ADMIN — ASPIRIN 81 MG: 81 TABLET, COATED ORAL at 08:02

## 2023-02-02 RX ADMIN — FUROSEMIDE 20 MG: 20 TABLET ORAL at 08:02

## 2023-02-02 NOTE — ASSESSMENT & PLAN NOTE
81 y/o female with prior stroke (residual left homonymous hemianopia), HTN, HLD, MI, GERD who presented with speech difficulty, confusion and urinary incontinence upon awakening. On exam, there is mild expressive aphasia with no other deficits. Initial CT head with no acute findings.  Patient unable to undergo MRI due to presence of nerve stimulator.  CTA head and neck with no new high-grade stenosis or occlusion (chronic right PCA occlusion).  Patient appears to be having intermittent speech difficulty for several months.  She was admitted in Dec for multiple syncopal events felt to be due to bradycardia, orthostasis, and hypotension.  EEG performed during Dec admission which did not show any epileptic activity.    Etiology for current event TIA/Small stroke versus seizure vs pre-syncopal event.  There was no evidence of bradycardia, hypotension or arrhythmia since admission.  No further events since admission but patient does have continued mild expressive aphasia on exam as well as multiple risk factors for stroke and prior embolic stroke event without etiology.  Would focus on secondary stroke prevention and stroke risk modification.  30 day event monitor would be beneficial for investigation for underlying PAF or other arrhythmias that may be contributing.  If patient continues to have events without clear etiology may benefit from repeat EEG with longer monitoring.    Antithrombotics for secondary stroke prevention: Antiplatelets: Aspirin: 81 mg daily  Clopidogrel: 75 mg daily x 21 days then monotherapy with ASA    Statins for secondary stroke prevention and hyperlipidemia, if present:   Statins: Atorvastatin- 80 mg daily - continue home regiment    Aggressive risk factor modification: HTN, DM, HLD, CAD     Rehab efforts: The patient has been evaluated by a stroke team provider and the therapy needs have been fully considered based off the presenting complaints and exam findings. The following therapy  evaluations are needed: PT evaluate and treat, OT evaluate and treat, SLP evaluate and treat    Diagnostics ordered/pending: None     VTE prophylaxis: None: Reason for No Pharmacological VTE Prophylaxis: Ambulating with or without assistance    -No further inpatient stroke workup needed at this time and patient can dispo with therapy recommendations once medically ready  -30 day event monitor with autotrigger ([id number]).  Please call [phone] to notify the tech (the order does not automatically cross over).  The device will be mailed to the patient.  -Ambulatory referral to Vascular Neurology in 4-6 weeks (Consult Order [id number])

## 2023-02-02 NOTE — DISCHARGE SUMMARY
"[facility] - Telemetry ([facility])  [facility] Medicine  Discharge Summary      Patient Name: Leslie Wood  MRN: [id number]  BECKY: [id number]  Patient Class: OP- Observation  Admission Date: 2/1/2023  Hospital Length of Stay: 0 days  Discharge Date and Time:  02/02/2023 3:31 PM  Attending Physician: Sammy Juarez MD   Discharging Provider: Sammy Juarez MD  Primary Care Provider: Angeles Carson MD    Primary Care Team: [facility] MEDICINE B    HPI:       Ms. Wood is a 82 year old female with a history of GERD, CAD, MI, hypertension, cardiomyopathy and CVA in 2020 who presented to the ED for eval of worsening confusion, aphasia that family noticed around 0530 this a (last known well time 1/31 approx 2100).  Per daughter at bedside, yesterday patient was in her normal state of health, went to her cardiology appointment and last night patient was noted to be a little confused, but family thought that was because she had a long day.  This am, when daughter went into patients room she found her mother sitting up with a "blank stare on her face", speech was slurred and she noticed she had urinated on herself.  Per [facility] who arrived around 0900, pt was able to talk, but she was still confused and had an abnormal finger to nose test on the R. Lab work in the ED unremarkable, UA with no signs of infection, chest xary with no signs of infection.  CT with chronic microvascular changes but no acute intracranial processes. Patient complains of a headache, she denies any N/V/D, fever/chills, SOB.  She will be admitted to observation for further work-up of AMS/TIA.  MRI/MRA unable to be done d/t nerve stimulator that is not compatible with MRI. Neurology consulted.      Code Status Full   Surrogate decision maker daughter Monik      * No surgery found *      Hospital Course:   Patient admitted for stroke like symptoms. Head CT and CTA head and neck unremarkable. Neuro consulted on case. Unable to perform MRI due to nerve stimulator " incompatibility.  Symptoms resolved. Will continue on dapt x 21 days then asa alone. Patient was discharged home with outpatient f/u with speech and neurology.        Goals of Care Treatment Preferences:  Code Status: Full Code      Consults:   Consults (From admission, onward)        Status Ordering Provider     Inpatient consult to Neurology  Once        Provider:  Alejandra Hopkins NP    Completed MELODIE GIRALDO     IP consult to case management/social work  Once        Provider:  (Not yet assigned)    Acknowledged MELODIE GIRALDO          No new Assessment & Plan notes have been filed under this hospital service since the last note was generated.  Service: Hospital Medicine    Final Active Diagnoses:    Diagnosis Date Noted POA    PRINCIPAL PROBLEM:  TIA (transient ischemic attack) [G45.9] 02/01/2023 Unknown    Aphasia [R47.01] 02/02/2023 Yes    Type 2 diabetes mellitus without complication, without long-term current use of insulin [E11.9] 01/24/2020 Yes     Chronic    Chronic back pain [M54.9, G89.29] 07/22/2019 Yes    Hyperlipidemia LDL goal <100 [E78.5] 07/30/2018 Yes     Chronic    Anxiety and depression [F41.9, F32.A] 09/06/2016 Unknown    Essential hypertension [I10] 07/30/2015 Yes     Chronic      Problems Resolved During this Admission:       Discharged Condition: good    Disposition: Home or Self Care    Follow Up:   Follow-up Information     Angeles Carson MD Follow up in 1 week(s).    Specialty: Internal Medicine  Contact information:  [address] [place] [address] [address] [place] [place] [address] [phone]                       Patient Instructions:      Ambulatory referral/consult to Speech Therapy   Standing Status: Future   Referral Priority: Routine Referral Type: Speech Therapy   Referral Reason: Specialty Services Required   Requested Specialty: Speech Pathology   Number of Visits Requested: 1     Ambulatory referral/consult to Vascular Neurology   Standing Status: Future   Referral  Priority: Routine Referral Type: Consultation   Referral Reason: Specialty Services Required   Requested Specialty: Vascular Neurology   Number of Visits Requested: 1       Significant Diagnostic Studies: Labs:   BMP:   Recent Labs   Lab 02/01/23  1039 02/02/23  0426   GLU 99 82   * 142   K 4.3 4.0    106   CO2 27 25   BUN 23 16   CREATININE 0.8 0.8   CALCIUM 10.1 9.9   MG  --  1.9    and CBC   Recent Labs   Lab 02/01/23  1039 02/02/23  0426   WBC 7.54 7.95   HGB 14.0 13.9   HCT 43.3 43.9    215       Pending Diagnostic Studies:     None         Medications:  Reconciled Home Medications:      Medication List      START taking these medications    aspirin 81 MG EC tablet  Commonly known as: ECOTRIN  Take 1 tablet (81 mg total) by mouth once daily.  Start taking on: February 3, 2023     clopidogreL 75 mg tablet  Commonly known as: PLAVIX  Take 1 tablet (75 mg total) by mouth once daily. for 21 days        CONTINUE taking these medications    ascorbic acid (vitamin C) 500 MG tablet  Commonly known as: VITAMIN C  Take 1 tablet by mouth every morning.     atorvastatin 80 MG tablet  Commonly known as: LIPITOR  Take 1 tablet by mouth every morning.     DULoxetine 30 MG capsule  Commonly known as: CYMBALTA  Take 30 mg by mouth once daily.     furosemide 20 MG tablet  Commonly known as: LASIX  Take 1 tablet (20 mg total) by mouth once daily.     losartan 50 MG tablet  Commonly known as: COZAAR  Take 1 tablet (50 mg total) by mouth 2 (two) times a day.     metoprolol succinate 25 MG 24 hr tablet  Commonly known as: TOPROL-XL  Take 1 tablet (25 mg total) by mouth once daily.     NIFEdipine 30 MG Tbsr  Commonly known as: ADALAT CC  Take 1 tablet (30 mg total) by mouth once daily.        ASK your doctor about these medications    NUCYNTA 50 mg Tab  Generic drug: tapentadoL  Take 1 tablet (50 mg total) by mouth every 8 (eight) hours as needed (severe pain). RESTART WHEN OK PER PAIN MANAGEMENT PROVIDER             Indwelling Lines/Drains at time of discharge:   Lines/Drains/Airways     None                 Time spent on the discharge of patient: 36 minutes         Sammy Juarez MD  Department of Hospital Medicine  '[place] - Telemetry ([facility])

## 2023-02-02 NOTE — PT/OT/SLP EVAL
Occupational Therapy   Evaluation    Name: Leslie Wood  MRN: [id number]  Admitting Diagnosis: TIA (transient ischemic attack)  Recent Surgery: * No surgery found *      Recommendations:     Discharge Recommendations: outpatient OT  Discharge Equipment Recommendations:  none  Barriers to discharge:       Assessment:     Leslie Wood is a 82 y.o. female with a medical diagnosis of TIA (transient ischemic attack).  She presents with debility ands generalized weakness. Performance deficits affecting function: weakness, impaired functional mobility, decreased safety awareness, impaired endurance, gait instability, impaired sensation, impaired balance, decreased upper extremity function, impaired self care skills.      Rehab Prognosis: Fair; patient would benefit from acute skilled OT services to address these deficits and reach maximum level of function.       Plan:     Patient to be seen 2 x/week to address the above listed problems via self-care/home management, therapeutic activities, therapeutic exercises  Plan of Care Expires: 02/16/23  Plan of Care Reviewed with: patient, family    Subjective     Chief Complaint: debility and generalized weakness  Patient/Family Comments/goals:     Occupational Profile:  Living Environment: lives with family in 1 story house with no steps  Previous level of function: (I) with adl's and mod (I) with functional mobility  Roles and Routines: occupational therapy  Equipment Used at Home: walker, rolling, rollator, shower chair, bedside commode, grab bar  Assistance upon Discharge:     Pain/Comfort:  Pain Rating 1: 5/10 (during r ue movement)  Location - Side 1: Right  Location - Orientation 1: upper  Location 1: arm    Patients cultural, spiritual, Voodoo conflicts given the current situation:      Objective:     Communicated with: nurse Smith and [facility] chart review prior to session.  Patient found HOB elevated with peripheral IV, telemetry upon OT entry to room.    General  Precautions: Standard, fall, aphasia  Orthopedic Precautions: N/A  Braces: N/A  Respiratory Status: Room air    Occupational Performance:    Bed Mobility:    Patient completed Rolling/Turning to Right with minimum assistance  Patient completed Scooting/Bridging with minimum assistance  Patient completed Supine to Sit with minimum assistance    Functional Mobility/Transfers:  Patient completed Sit <> Stand Transfer with minimum assistance  with  hand-held assist   Patient completed Toilet Transfer Step Transfer technique with stand by assistance with  no AD  Functional Mobility: pt  ambulated 8 feet x2 with min a x 1 seated break    Activities of Daily Living:  Upper Body Dressing: minimum assistance .  Toileting: stand by assistance .    Cognitive/Visual Perceptual:  Cognitive/Psychosocial Skills:     -       Oriented to: Person, Place, Time, and Situation   -       Follows Commands/attention:Follows multistep  commands  -       Communication: clear/fluent  -       Memory: unable to accurately assess  -       Safety awareness/insight to disability: impaired     Physical Exam:  Dominant hand: -       l handed  Upper Extremity Range of Motion:     -       Right Upper Extremity: approx 90 degrees  shoulder flexion  -       Left Upper Extremity: WFL  Upper Extremity Strength:    -       Right Upper Extremity: mmt: 3/5 grossly  -       Left Upper Extremity: mmt: 3/5 grossly   Strength:    -       Right Upper Extremity: mmt: 3/5 grossly  -       Left Upper Extremity: mmt: 3/5 grossly    AMPAC 6 Click ADL:  AMPAC Total Score: 17    Treatment & Education:  Patient educated on role of OT in acute setting and benefits of participation. Educated on techniques to use to increase independence and decrease fall risk with functional transfers. Educated on importance of OOB activity and calling for A to transfer back to bed. Encouraged completion of B UE AROM therex throughout the day to tolerance to increase functional  strength and activity tolerance. Patient stated understanding and in agreement with POC.      Patient left up in chair with all lines intact, call button in reach, nurse mehul notified, and family present    GOALS:   Multidisciplinary Problems       Occupational Therapy Goals          Problem: Occupational Therapy    Goal Priority Disciplines Outcome Interventions   Occupational Therapy Goal     OT, PT/OT     Description: O.T.. goals to be met by 2-16-23  S with ue dressing  Pt will tolerate 1 set x 10 reps b ue rom exercise with in pain-free rom  S with sit<>stand transfers                       History:     Past Medical History:   Diagnosis Date    Arthritis     Cataract     GERD (gastroesophageal reflux disease)     Heart attack 05/18/2022    Heart attack 05/23/2022    Hyperlipidemia     Hypertension     Stroke          Past Surgical History:   Procedure Laterality Date    ADENOIDECTOMY      ADRENAL GLAND SURGERY      APPENDECTOMY      BACK SURGERY      fusion l 4-5 s 1,2,3  fusion l 2-3    CORONARY ANGIOGRAPHY N/A 5/20/2022    Procedure: ANGIOGRAM, CORONARY ARTERY;  Surgeon: Domingo Martins MD;  Location: [facility] CATH LAB;  Service: Cardiology;  Laterality: N/A;    CORONARY ANGIOGRAPHY N/A 5/24/2022    Procedure: ANGIOGRAM, CORONARY ARTERY;  Surgeon: Domingo Martins MD;  Location: [facility] CATH LAB;  Service: Cardiology;  Laterality: N/A;    EYE SURGERY      HEMORRHOID SURGERY      HERNIA REPAIR      HYSTERECTOMY      indirect lumbar decompression      percutaneous placement of interspinous extension blocker    LEFT HEART CATHETERIZATION Left 5/20/2022    Procedure: CATHETERIZATION, HEART, LEFT;  Surgeon: Domingo Martins MD;  Location: [facility] CATH LAB;  Service: Cardiology;  Laterality: Left;    TONSILLECTOMY         Time Tracking:     OT Date of Treatment: 02/02/23  OT Start Time: 1420  OT Stop Time: 1445  OT Total Time (min): 25 min    Billable Minutes:Evaluation 15 minutes  Therapeutic Activity 10  minutes    2/2/2023

## 2023-02-02 NOTE — PLAN OF CARE
Aox4.    Able to verbalize needs & follow commands. Calm and cooperative throughout shift.   POC reviewed with pt and daughter. Interventions implemented as appropriate.    VSS; SB on tele-monitor.  On RA.      20g PIV to RAC patent. Integrity maintained.    Receiving PO diuresis.   Regular diet.   Neuro-checks complete. No deficits noted.  Skin WDI.    No c/o pain.    Lovenox for VTE.  Ambulates w/ assist. Activity ad pedro. Frequent position changes encouraged. Able to reposition in bed independently.  Educated on s/sx of pressure injury;  verbalized understanding.  NADN. Resting quietly in bed.   Free of falls. Hourly rounding complete.   All safety measures remain in place. SR up x2; bed low & locked. Bed alarm on and audible. Call light w/in reach.   Will continue to monitor throughout shift  Daughter at bedside actively participating in POC.  Chart check complete.      
[facility] - Telemetry (Hospital)  Discharge Assessment    Primary Care Provider: Angeles Carson MD     Discharge Assessment (most recent)       BRIEF DISCHARGE ASSESSMENT - 02/02/23 4682          Discharge Planning    Assessment Type Discharge Planning Brief Assessment     Resource/Environmental Concerns none     Support Systems Children;Family members     Assistance Needed NA     Equipment Currently Used at Home walker, rolling;shower chair;raised toilet;grab bar     Current Living Arrangements home     Patient/Family Anticipates Transition to home with family     Patient/Family Anticipated Services at Transition outpatient care   Ambulatory referral to outpatient ST    DME Needed Upon Discharge  none     Discharge Plan A Home;Home with family     Discharge Plan B Home;Home with family                   Anticipated DC dispo: Home with family   Prior Level of Function: Lives at home with adult daughter and grandchild. Pt ambulates with DME   PCP: Lori Carson MD     Comments: Pt to discharge home with family. Pt's daughter will be help at home and discharge transportation.           
[facility] - Telemetry (Hospital)  Discharge Final Note    Primary Care Provider: Angeles Carson MD    Expected Discharge Date: 2/2/2023    Final Discharge Note (most recent)       Final Note - 02/02/23 1619          Final Note    Assessment Type Final Discharge Note     Anticipated Discharge Disposition Home or Self Care     Hospital Resources/Appts/Education Provided Appointments scheduled and added to AVS;Post-Acute resouces added to AVS        Post-Acute Status    Discharge Delays None known at this time                   Pt to discharge home today. MD order ambulatory referral to outpatient ST; [facility] scheduling will contact patient to coordinate appointments.     PCP scheduled: Health Assessment with Kate Royal NP,  Friday Feb 10, 2023 11:00 AM    No CM needs for discharge    Important Message from Medicare             Contact Info       Angeles Carson MD   Specialty: Internal Medicine   Relationship: PCP - [address] [place] [address]   Phone: [phone]       Next Steps: Follow up in 1 week(s)            
PT GT TRAINED X 80' WITH RW AND SBA. SCORED 38.7 SEC ON TUG. P.T. REC HH P.T. AND 24 HOUR S    
Pt received discharge instructions and verbalized understanding. Pt was stable. IV and monitor was removed.Pt is ready for discharge.   
See eval for details. Pt displayed deficits with adl's skills , decrease functional mobility/ transfer as well as decrease rom / strength/ endurance. Recommend : out -pt  
None known

## 2023-02-02 NOTE — HPI
"83 y/o female with prior stroke (residual left homonymous hemianopia), HTN, HLD, MI, GERD who presented with confusion and speech difficulty.  Patient last known normal 1/31 at 21:00.  Patient awoke with symptoms.  Daughter found the patient sitting up with a "blank stare on her face," speech was slurred and she was incontinent of urine.  Granddaughter states she may have awoken in the middle of the night and put makeup on - upon awakening she had eye makeup on one eye that was not there at bedtime.  Patient has had intermittent speech difficulty for some time now but worse over the last 3 months per granddaughter.  Patient reports at times she is not able to get words out or unable to remember the name for items.  Patient is high functioning and worked full time up until her stroke in 2020.  She continues to work part time.    Patient states today she is feeling good but continues with intermittent speech difficulty.  This was noted during our conversation as well. She denied associated unilateral weakness or facial droop with current event.  Patient was admitted 12/15-12/19 after multiple syncopal events.  She was noted to have HR in 40's, orthostatic, and hypotensive.  All felt to be due recent medication changes.  EEG was performed at that time with generalized slowing and no seizure activity.  The current event seemed to be different from her prior events.  "

## 2023-02-02 NOTE — HOSPITAL COURSE
Patient admitted for stroke like symptoms. Head CT and CTA head and neck unremarkable. Neuro consulted on case. Unable to perform MRI due to nerve stimulator incompatibility.  Symptoms resolved. Will continue on dapt x 21 days then asa alone. Patient was discharged home with outpatient f/u with speech and neurology.

## 2023-02-02 NOTE — ASSESSMENT & PLAN NOTE
-Stroke risk factor  -Can resume home regiment to slowly reduce BP  -Avoid sudden drops in BP  -Long term goal < 130/80

## 2023-02-02 NOTE — ASSESSMENT & PLAN NOTE
-Stroke risk factor  -A1c 5.8  -Goal glucose 140-180 while hospitalized  -Encourage continued compliance with diet, activity, and medications

## 2023-02-02 NOTE — PT/OT/SLP EVAL
Physical Therapy Evaluation    Patient Name:  Leslie Wood   MRN:  [id number]    Recommendations:     Discharge Recommendations: home health PT (24 HR S)   Discharge Equipment Recommendations: none   Barriers to discharge: None    Assessment:     Leslie Wood is a 82 y.o. female admitted with a medical diagnosis of TIA (transient ischemic attack).  She presents with the following impairments/functional limitations: weakness, impaired endurance, impaired balance, gait instability, decreased lower extremity function, impaired functional mobility, impaired self care skills .    Rehab Prognosis: Good; patient would benefit from acute skilled PT services to address these deficits and reach maximum level of function.    Recent Surgery: * No surgery found *      Plan:     During this hospitalization, patient to be seen 3 x/week to address the identified rehab impairments via gait training, therapeutic activities, therapeutic exercises and progress toward the following goals:    Plan of Care Expires:  02/16/23    Subjective     Chief Complaint: LE WEAKNESS  Patient/Family Comments/goals: INC LE STRENGTH  Pain/Comfort:  Pain Rating 1: 3/10  Location 1: back  Pain Rating Post-Intervention 1: 3/10    Patients cultural, spiritual, [employer] conflicts given the current situation:      Living Environment:  PT LIVES AT HOME WITH DAUGHTER AND GRANDSON IN A ONE STORY HOME WITH NO STEPS TO ENTER HOME   Prior to admission, patients level of function was MOD I WITH RW AND DOESN'T DRIVE .  Equipment used at home: walker, rolling, shower chair, other (see comments), grab bar (ELEVATED TOILET SEAT).  DME owned (not currently used): none.  Upon discharge, patient will have assistance from FAMILY .    Objective:     Communicated with NURSE FLOR AND [facility] CHART REVIEW  prior to session.  Patient found supine with peripheral IV, telemetry  upon PT entry to room.    General Precautions: Standard, fall  Orthopedic Precautions:N/A   Braces:  "N/A  Respiratory Status: Room air    Exams:  Cognitive Exam:  Patient is oriented to Person, Place, and Time  Postural Exam:  Patient presented with the following abnormalities:    -       Rounded shoulders  -       Forward head  RLE ROM: WFL  RLE Strength: LIMITED  LLE ROM: WFL  LLE Strength: LIMITED    Functional Mobility:  Bed Mobility:     Rolling Right: supervision  Supine to Sit: supervision  Transfers:     Sit to Stand:  stand by assistance with rolling walker  Bed to Chair: stand by assistance with  rolling walker  using  Stand Pivot  Gait: PT GT TRAINED X 20' WITH RW AND SBA  Timed Up and Go: SCORED 38.7 SEC WITH RW      AM-PAC 6 CLICK MOBILITY  Total Score:21       Treatment & Education:  2ND GT TRAINING TRIAL X 80' WITH RW AND SBA. PT RETURNED TO RM T/F TO CHAIR WITH RW AND SBA. PT EDUCATED ON ROLE OF P.T. AND PT EDUCATED ON RISK FOR FALLS DUE TO GENERALIZED WEAKNESS, EDUCATED ON "CALL DON'T FALL", ENCOURAGED TO CALL FOR ASSISTANCE WITH ALL NEEDS SUCH AS BED<>CHAIR TRANSFERS OR TRIPS TO BATHROOM.       Patient left up in chair with call button in reach, chair alarm on, and DAUGHTER present.    GOALS:   Multidisciplinary Problems       Physical Therapy Goals          Problem: Physical Therapy    Goal Priority Disciplines Outcome Goal Variances Interventions   Physical Therapy Goal     PT, PT/OT      Description: LT23  1. PT WILL COMPLETE BED MOBILITY IND  2. PT WILL T/F TO CHAIR SARAH IND.  3. PT WILL GT TRAIN WITH INC SPEED BY .3 SEC ON THE TUG TEST TO DEC FALL RISK WITH RW                        History:     Past Medical History:   Diagnosis Date    Arthritis     Cataract     GERD (gastroesophageal reflux disease)     Heart attack 2022    Heart attack 2022    Hyperlipidemia     Hypertension     Stroke        Past Surgical History:   Procedure Laterality Date    ADENOIDECTOMY      ADRENAL GLAND SURGERY      APPENDECTOMY      BACK SURGERY      fusion l 4-5 s 1,2,3  fusion l 2-3    " CORONARY ANGIOGRAPHY N/A 5/20/2022    Procedure: ANGIOGRAM, CORONARY ARTERY;  Surgeon: Domingo Martins MD;  Location: [facility] CATH LAB;  Service: Cardiology;  Laterality: N/A;    CORONARY ANGIOGRAPHY N/A 5/24/2022    Procedure: ANGIOGRAM, CORONARY ARTERY;  Surgeon: Domingo Martins MD;  Location: [facility] CATH LAB;  Service: Cardiology;  Laterality: N/A;    EYE SURGERY      HEMORRHOID SURGERY      HERNIA REPAIR      HYSTERECTOMY      indirect lumbar decompression      percutaneous placement of interspinous extension blocker    LEFT HEART CATHETERIZATION Left 5/20/2022    Procedure: CATHETERIZATION, HEART, LEFT;  Surgeon: Domingo Martins MD;  Location: [facility] CATH LAB;  Service: Cardiology;  Laterality: Left;    TONSILLECTOMY         Time Tracking:     PT Received On: 02/02/23  PT Start Time: 0800     PT Stop Time: 0825  PT Total Time (min): 25 min     Billable Minutes: Evaluation 15 and Gait Training 10      02/02/2023

## 2023-02-02 NOTE — SUBJECTIVE & OBJECTIVE
Past Medical History:   Diagnosis Date    Arthritis     Cataract     GERD (gastroesophageal reflux disease)     Heart attack 05/18/2022    Heart attack 05/23/2022    Hyperlipidemia     Hypertension     Stroke      Past Surgical History:   Procedure Laterality Date    ADENOIDECTOMY      ADRENAL GLAND SURGERY      APPENDECTOMY      BACK SURGERY      fusion l 4-5 s 1,2,3  fusion l 2-3    CORONARY ANGIOGRAPHY N/A 5/20/2022    Procedure: ANGIOGRAM, CORONARY ARTERY;  Surgeon: Domingo Martins MD;  Location: [facility] CATH LAB;  Service: Cardiology;  Laterality: N/A;    CORONARY ANGIOGRAPHY N/A 5/24/2022    Procedure: ANGIOGRAM, CORONARY ARTERY;  Surgeon: Domingo Martins MD;  Location: [facility] CATH LAB;  Service: Cardiology;  Laterality: N/A;    EYE SURGERY      HEMORRHOID SURGERY      HERNIA REPAIR      HYSTERECTOMY      indirect lumbar decompression      percutaneous placement of interspinous extension blocker    LEFT HEART CATHETERIZATION Left 5/20/2022    Procedure: CATHETERIZATION, HEART, LEFT;  Surgeon: Domingo Martins MD;  Location: [facility] CATH LAB;  Service: Cardiology;  Laterality: Left;    TONSILLECTOMY       Family History   Problem Relation Age of Onset    Heart disease Mother     Hypertension Mother     Diabetes Mother     Hypertension Father     Kidney disease Father     Breast cancer Sister      Social History     Tobacco Use    Smoking status: Never    Smokeless tobacco: Never   Substance Use Topics    Alcohol use: No    Drug use: No     Review of patient's allergies indicates:   Allergen Reactions    Acetaminophen     Amitriptyline     Hydrochlorothiazide      Causes muscle cramping    Lisinopril      hyperkalemia    Oxycodone     Percocet [oxycodone-acetaminophen] Other (See Comments)     Seizures    Belbuca [buprenorphine hcl] Nausea And Vomiting and Other (See Comments)     Black out     Codeine Nausea Only and Rash    Prazosin Other (See Comments)     dizziness       Medications: I have reviewed the  current medication administration record.    Medications Prior to Admission   Medication Sig Dispense Refill Last Dose    ascorbic acid, vitamin C, (VITAMIN C) 500 MG tablet Take 1 tablet by mouth every morning.       atorvastatin (LIPITOR) 80 MG tablet Take 1 tablet by mouth every morning.       DULoxetine (CYMBALTA) 30 MG capsule Take 30 mg by mouth once daily.       furosemide (LASIX) 20 MG tablet Take 1 tablet (20 mg total) by mouth once daily. 30 tablet 11     losartan (COZAAR) 50 MG tablet Take 1 tablet (50 mg total) by mouth 2 (two) times a day. 30 tablet 0     metoprolol succinate (TOPROL-XL) 25 MG 24 hr tablet Take 1 tablet (25 mg total) by mouth once daily. 90 tablet 3     NIFEdipine (ADALAT CC) 30 MG TbSR Take 1 tablet (30 mg total) by mouth once daily. 30 tablet 0     NUCYNTA 50 mg Tab Take 1 tablet (50 mg total) by mouth every 8 (eight) hours as needed (severe pain). RESTART WHEN OK PER PAIN MANAGEMENT PROVIDER (Patient not taking: Reported on 1/31/2023) 1 tablet 0        Review of Systems   Constitutional:  Negative for activity change and fever.   HENT:  Negative for drooling and trouble swallowing.    Eyes:  Positive for visual disturbance (Chronic). Negative for discharge.   Respiratory:  Negative for cough and shortness of breath.    Cardiovascular:  Positive for chest pain and palpitations. Negative for leg swelling.   Gastrointestinal:  Negative for abdominal pain, diarrhea, nausea and vomiting.   Genitourinary:  Negative for difficulty urinating and dysuria.   Musculoskeletal:  Positive for gait problem.   Skin:  Negative for color change and pallor.   Neurological:  Positive for speech difficulty. Negative for dizziness, facial asymmetry, weakness, light-headedness, numbness and headaches.   Psychiatric/Behavioral:  Positive for confusion. Negative for agitation.    Objective:     Vital Signs (Most Recent):  Temp: 98.3 °F (36.8 °C) (02/02/23 0721)  Pulse: (!) 55 (02/02/23 0721)  Resp: 20  (02/02/23 0721)  BP: 137/61 (02/02/23 0721)  SpO2: (!) 94 % (02/02/23 0721)    Vital Signs Range (Last 24H):  Temp:  [97.4 °F (36.3 °C)-98.3 °F (36.8 °C)]   Pulse:  [51-72]   Resp:  [16-20]   BP: (125-214)/(60-96)   SpO2:  [92 %-100 %]     Physical Exam  Constitutional:       General: She is not in acute distress.  HENT:      Head: Normocephalic.      Right Ear: External ear normal.      Left Ear: External ear normal.      Nose: Nose normal.   Pulmonary:      Effort: Pulmonary effort is normal. No respiratory distress.   Abdominal:      General: There is no distension.      Tenderness: There is no guarding.   Musculoskeletal:      Cervical back: Normal range of motion.      Right lower leg: No edema.      Left lower leg: No edema.   Skin:     General: Skin is dry.   Neurological:      Mental Status: She is alert.   Psychiatric:         Mood and Affect: Mood normal.         Behavior: Behavior normal.       Neurological Exam:   LOC: alert  Attention Span: Good   Language: Expressive aphasia  Articulation: No dysarthria  Orientation: Person, Place, Time   Visual Fields: Hemianopsia left  EOM (CN III, IV, VI): Full/intact  Facial Sensation (CN V): Normal  Facial Movement (CN VII): Symmetric facial expression    Motor: Arm left    Full antigravity; Full power noted with nurse assistance  Leg left    Full antigravity; Full power noted with nurse assistance  Arm right    Full antigravity; Full power noted with nurse assistance  Leg right   Full antigravity; Full power noted with nurse assistance  Cerebellum: There is some dysmetria bilaterally which appears to be more related to chronic shoulder issues  Sensation: Intact to light touch      Laboratory:  CMP:   Recent Labs   Lab 02/02/23 0426   CALCIUM 9.9   ALBUMIN 3.5   PROT 6.8      K 4.0   CO2 25      BUN 16   CREATININE 0.8   ALKPHOS 85   ALT 28   AST 26   BILITOT 0.7     CBC:   Recent Labs   Lab 02/02/23 0426   WBC 7.95   RBC 4.88   HGB 13.9   HCT 43.9       MCV 90   MCH 28.5   MCHC 31.7*     Lipid Panel:   Recent Labs   Lab 23  1337   CHOL 96*   LDLCALC 38.8*   HDL 47   TRIG 51     Coagulation:   Recent Labs   Lab 23  0426   INR 1.0   APTT 27.2     Hgb A1C:   Recent Labs   Lab 23  1337   HGBA1C 5.8*     TSH:   Recent Labs   Lab 23  1337   TSH 1.171       Diagnostic Results:      Brain imagin2023 CT head  No early infarct signs.  No hemorrhage.  No mass effect.    Vessel Imagin2023 CTA head and neck  No high-grade stenosis or occlusion within the neck vessels.  There is a right PCA occlusion which corresponds to prior right PCA infarct.      Cardiac Evaluation:   12/15/2022  EF 60%; no wall motion abnormality; no thrombus/vegetation; normal left atrium

## 2023-02-02 NOTE — CONSULTS
[facility] - Telemetry ([facility])  Vascular Neurology  Comprehensive Stroke Center  TeleVascular Neurology Consult Note    Inpatient consult to Neurology  Consult performed by: Alejandra Hopkins NP  Consult ordered by: Karen Driver NP        Assessment/Plan:     Patient is a 82 y.o. year old female with:    * TIA (transient ischemic attack)  83 y/o female with prior stroke (residual left homonymous hemianopia), HTN, HLD, MI, GERD who presented with speech difficulty, confusion and urinary incontinence upon awakening. On exam, there is mild expressive aphasia with no other deficits. Initial CT head with no acute findings.  Patient unable to undergo MRI due to presence of nerve stimulator.  CTA head and neck with no new high-grade stenosis or occlusion (chronic right PCA occlusion).  Patient appears to be having intermittent speech difficulty for several months.  She was admitted in Dec for multiple syncopal events felt to be due to bradycardia, orthostasis, and hypotension.  EEG performed during Dec admission which did not show any epileptic activity.    Etiology for current event TIA/Small stroke versus seizure vs pre-syncopal event.  There was no evidence of bradycardia, hypotension or arrhythmia since admission.  No further events since admission but patient does have continued mild expressive aphasia on exam as well as multiple risk factors for stroke and prior embolic stroke event without etiology.  Would focus on secondary stroke prevention and stroke risk modification.  30 day event monitor would be beneficial for investigation for underlying PAF or other arrhythmias that may be contributing.  If patient continues to have events without clear etiology may benefit from repeat EEG with longer monitoring.    Antithrombotics for secondary stroke prevention: Antiplatelets: Aspirin: 81 mg daily  Clopidogrel: 75 mg daily x 21 days then monotherapy with ASA    Statins for secondary stroke prevention and  hyperlipidemia, if present:   Statins: Atorvastatin- 80 mg daily - continue home regiment    Aggressive risk factor modification: HTN, DM, HLD, CAD     Rehab efforts: The patient has been evaluated by a stroke team provider and the therapy needs have been fully considered based off the presenting complaints and exam findings. The following therapy evaluations are needed: PT evaluate and treat, OT evaluate and treat, SLP evaluate and treat    Diagnostics ordered/pending: None     VTE prophylaxis: None: Reason for No Pharmacological VTE Prophylaxis: Ambulating with or without assistance    -No further inpatient stroke workup needed at this time and patient can dispo with therapy recommendations once medically ready  -30 day event monitor with autotrigger (CV05).  Please call [phone] to notify the tech (the order does not automatically cross over).  The device will be mailed to the patient.  -Ambulatory referral to Vascular Neurology in 4-6 weeks (Consult Order [id number])        Aphasia  -SLP to evaluate and treat    Type 2 diabetes mellitus without complication, without long-term current use of insulin  -Stroke risk factor  -A1c 5.8  -Goal glucose 140-180 while hospitalized  -Encourage continued compliance with diet, activity, and medications      Hyperlipidemia LDL goal <100  -Stroke risk factor  -LDL 38.8  -Continue home atorvastatin      Essential hypertension  -Stroke risk factor  -Can resume home regiment to slowly reduce BP  -Avoid sudden drops in BP  -Long term goal < 130/80        STROKE DOCUMENTATION          NIH Scale:  1a. Level of Consciousness: 0-->Alert, keenly responsive  1b. LOC Questions: 0-->Answers both questions correctly  1c. LOC Commands: 0-->Performs both tasks correctly  2. Best Gaze: 0-->Normal  3. Visual: 2-->Complete hemianopia  4. Facial Palsy: 0-->Normal symmetrical movements  5a. Motor Arm, Left: 0-->No drift, limb holds 90 (or 45) degrees for full 10 secs  5b. Motor Arm, Right: 0-->No  "drift, limb holds 90 (or 45) degrees for full 10 secs  6a. Motor Leg, Left: 0-->No drift, leg holds 30 degree position for full 5 secs  6b. Motor Leg, Right: 0-->No drift, leg holds 30 degree position for full 5 secs  7. Limb Ataxia: 0-->Absent  8. Sensory: 0-->Normal, no sensory loss  9. Best Language: 1-->Mild-to-moderate aphasia, some obvious loss of fluency or facility of comprehension, without significant limitation on ideas expressed or form of expression. Reduction of speech and/or comprehension, however, makes conversation. . . (see row details)  10. Dysarthria: 0-->Normal  11. Extinction and Inattention (formerly Neglect): 0-->No abnormality  Total (NIH Stroke Scale): 3    Modified Clinton Score: 2  [place] Coma Scale:    ABCD2 Score:    [id number] Score:   HAS -BLED Score:   ICH Score:   Hunt & Giraldo Classification:       Thrombolysis Candidate? No, Out of window - Symptom onset > 4.5 hours    Delays to Thrombolysis?  Not Applicable    Interventional Revascularization Candidate?   Is the patient eligible for mechanical endovascular reperfusion (HERBIE)?  No; No large vessel occlusion identified on imaging     Delays to Thrombectomy? Not Applicable    Hemorrhagic change of an Ischemic Stroke: Does this patient have an ischemic stroke with hemorrhagic changes? No     Subjective:     History of Present Illness:  81 y/o female with prior stroke (residual left homonymous hemianopia), HTN, HLD, MI, GERD who presented with confusion and speech difficulty.  Patient last known normal 1/31 at 21:00.  Patient awoke with symptoms.  Daughter found the patient sitting up with a "blank stare on her face," speech was slurred and she was incontinent of urine.  Granddaughter states she may have awoken in the middle of the night and put makeup on - upon awakening she had eye makeup on one eye that was not there at bedtime.  Patient has had intermittent speech difficulty for some time now but worse over the last 3 months per " granddaughter.  Patient reports at times she is not able to get words out or unable to remember the name for items.  Patient is high functioning and worked full time up until her stroke in 2020.  She continues to work part time.    Patient states today she is feeling good but continues with intermittent speech difficulty.  This was noted during our conversation as well. She denied associated unilateral weakness or facial droop with current event.  Patient was admitted 12/15-12/19 after multiple syncopal events.  She was noted to have HR in 40's, orthostatic, and hypotensive.  All felt to be due recent medication changes.  EEG was performed at that time with generalized slowing and no seizure activity.  The current event seemed to be different from her prior events.          Past Medical History:   Diagnosis Date    Arthritis     Cataract     GERD (gastroesophageal reflux disease)     Heart attack 05/18/2022    Heart attack 05/23/2022    Hyperlipidemia     Hypertension     Stroke      Past Surgical History:   Procedure Laterality Date    ADENOIDECTOMY      ADRENAL GLAND SURGERY      APPENDECTOMY      BACK SURGERY      fusion l 4-5 s 1,2,3  fusion l 2-3    CORONARY ANGIOGRAPHY N/A 5/20/2022    Procedure: ANGIOGRAM, CORONARY ARTERY;  Surgeon: Domingo Martins MD;  Location: [facility] CATH LAB;  Service: Cardiology;  Laterality: N/A;    CORONARY ANGIOGRAPHY N/A 5/24/2022    Procedure: ANGIOGRAM, CORONARY ARTERY;  Surgeon: Domingo Martins MD;  Location: [facility] CATH LAB;  Service: Cardiology;  Laterality: N/A;    EYE SURGERY      HEMORRHOID SURGERY      HERNIA REPAIR      HYSTERECTOMY      indirect lumbar decompression      percutaneous placement of interspinous extension blocker    LEFT HEART CATHETERIZATION Left 5/20/2022    Procedure: CATHETERIZATION, HEART, LEFT;  Surgeon: Domingo Martins MD;  Location: [facility] CATH LAB;  Service: Cardiology;  Laterality: Left;    TONSILLECTOMY       Family History    Problem Relation Age of Onset    Heart disease Mother     Hypertension Mother     Diabetes Mother     Hypertension Father     Kidney disease Father     Breast cancer Sister      Social History     Tobacco Use    Smoking status: Never    Smokeless tobacco: Never   Substance Use Topics    Alcohol use: No    Drug use: No     Review of patient's allergies indicates:   Allergen Reactions    Acetaminophen     Amitriptyline     Hydrochlorothiazide      Causes muscle cramping    Lisinopril      hyperkalemia    Oxycodone     Percocet [oxycodone-acetaminophen] Other (See Comments)     Seizures    Belbuca [buprenorphine hcl] Nausea And Vomiting and Other (See Comments)     Black out     Codeine Nausea Only and Rash    Prazosin Other (See Comments)     dizziness       Medications: I have reviewed the current medication administration record.    Medications Prior to Admission   Medication Sig Dispense Refill Last Dose    ascorbic acid, vitamin C, (VITAMIN C) 500 MG tablet Take 1 tablet by mouth every morning.       atorvastatin (LIPITOR) 80 MG tablet Take 1 tablet by mouth every morning.       DULoxetine (CYMBALTA) 30 MG capsule Take 30 mg by mouth once daily.       furosemide (LASIX) 20 MG tablet Take 1 tablet (20 mg total) by mouth once daily. 30 tablet 11     losartan (COZAAR) 50 MG tablet Take 1 tablet (50 mg total) by mouth 2 (two) times a day. 30 tablet 0     metoprolol succinate (TOPROL-XL) 25 MG 24 hr tablet Take 1 tablet (25 mg total) by mouth once daily. 90 tablet 3     NIFEdipine (ADALAT CC) 30 MG TbSR Take 1 tablet (30 mg total) by mouth once daily. 30 tablet 0     NUCYNTA 50 mg Tab Take 1 tablet (50 mg total) by mouth every 8 (eight) hours as needed (severe pain). RESTART WHEN OK PER PAIN MANAGEMENT PROVIDER (Patient not taking: Reported on 1/31/2023) 1 tablet 0        Review of Systems   Constitutional:  Negative for activity change and fever.   HENT:  Negative for drooling and trouble  swallowing.    Eyes:  Positive for visual disturbance (Chronic). Negative for discharge.   Respiratory:  Negative for cough and shortness of breath.    Cardiovascular:  Positive for chest pain and palpitations. Negative for leg swelling.   Gastrointestinal:  Negative for abdominal pain, diarrhea, nausea and vomiting.   Genitourinary:  Negative for difficulty urinating and dysuria.   Musculoskeletal:  Positive for gait problem.   Skin:  Negative for color change and pallor.   Neurological:  Positive for speech difficulty. Negative for dizziness, facial asymmetry, weakness, light-headedness, numbness and headaches.   Psychiatric/Behavioral:  Positive for confusion. Negative for agitation.    Objective:     Vital Signs (Most Recent):  Temp: 98.3 °F (36.8 °C) (02/02/23 0721)  Pulse: (!) 55 (02/02/23 0721)  Resp: 20 (02/02/23 0721)  BP: 137/61 (02/02/23 0721)  SpO2: (!) 94 % (02/02/23 0721)    Vital Signs Range (Last 24H):  Temp:  [97.4 °F (36.3 °C)-98.3 °F (36.8 °C)]   Pulse:  [51-72]   Resp:  [16-20]   BP: (125-214)/(60-96)   SpO2:  [92 %-100 %]     Physical Exam  Constitutional:       General: She is not in acute distress.  HENT:      Head: Normocephalic.      Right Ear: External ear normal.      Left Ear: External ear normal.      Nose: Nose normal.   Pulmonary:      Effort: Pulmonary effort is normal. No respiratory distress.   Abdominal:      General: There is no distension.      Tenderness: There is no guarding.   Musculoskeletal:      Cervical back: Normal range of motion.      Right lower leg: No edema.      Left lower leg: No edema.   Skin:     General: Skin is dry.   Neurological:      Mental Status: She is alert.   Psychiatric:         Mood and Affect: Mood normal.         Behavior: Behavior normal.       Neurological Exam:   LOC: alert  Attention Span: Good   Language: Expressive aphasia  Articulation: No dysarthria  Orientation: Person, Place, Time   Visual Fields: Hemianopsia left  EOM (CN III, IV, VI):  Full/intact  Facial Sensation (CN V): Normal  Facial Movement (CN VII): Symmetric facial expression    Motor: Arm left    Full antigravity; Full power noted with nurse assistance  Leg left    Full antigravity; Full power noted with nurse assistance  Arm right    Full antigravity; Full power noted with nurse assistance  Leg right   Full antigravity; Full power noted with nurse assistance  Cerebellum: There is some dysmetria bilaterally which appears to be more related to chronic shoulder issues  Sensation: Intact to light touch      Laboratory:  CMP:   Recent Labs   Lab 23  0426   CALCIUM 9.9   ALBUMIN 3.5   PROT 6.8      K 4.0   CO2 25      BUN 16   CREATININE 0.8   ALKPHOS 85   ALT 28   AST 26   BILITOT 0.7     CBC:   Recent Labs   Lab 23  042   WBC 7.95   RBC 4.88   HGB 13.9   HCT 43.9      MCV 90   MCH 28.5   MCHC 31.7*     Lipid Panel:   Recent Labs   Lab 23  1337   CHOL 96*   LDLCALC 38.8*   HDL 47   TRIG 51     Coagulation:   Recent Labs   Lab 23  0426   INR 1.0   APTT 27.2     Hgb A1C:   Recent Labs   Lab 23  1337   HGBA1C 5.8*     TSH:   Recent Labs   Lab 23  1337   TSH 1.171       Diagnostic Results:      Brain imagin2023 CT head  No early infarct signs.  No hemorrhage.  No mass effect.    Vessel Imagin2023 CTA head and neck  No high-grade stenosis or occlusion within the neck vessels.  There is a right PCA occlusion which corresponds to prior right PCA infarct.      Cardiac Evaluation:   12/15/2022  EF 60%; no wall motion abnormality; no thrombus/vegetation; normal left atrium    VIRTUAL TELENOTE    Chief complaint: Confusion, aphasia  The patient location is: [place]  Present with the patient at the time of the telemed/virtual assessment: Granddaughter     The attending portion of this evaluation, treatment, and documentation was performed per Alejandra Hopkins NP via Telemedicine AudioVisual using the secure [employer] software  platform with 2 way audio/video. The provider was located off-site and the patient is located in the hospital. The aforementioned video software was utilized to document the relevant history and physical exam.      Alejandra Hopkins NP  [facility]  Department of Vascular Neurology   [facility]

## 2023-02-07 ENCOUNTER — PATIENT OUTREACH (OUTPATIENT)
Dept: ADMINISTRATIVE | Facility: HOSPITAL | Age: 83
End: 2023-02-07
Payer: MEDICARE

## 2023-02-07 DIAGNOSIS — Z00.00 ENCOUNTER FOR MEDICARE ANNUAL WELLNESS EXAM: ICD-10-CM

## 2023-02-07 NOTE — PROGRESS NOTES
Called patient to confirm hospital follow up scheduled on 2/09/2023.   Patient is confirmed. Encouraged patient to bring all medications and BP cuff to follow up visit.     Spoke with Ms Thakkar (granddaughter)  to confirm appt.

## 2023-02-08 ENCOUNTER — TELEPHONE (OUTPATIENT)
Dept: ADMINISTRATIVE | Facility: CLINIC | Age: 83
End: 2023-02-08
Payer: MEDICARE

## 2023-02-09 ENCOUNTER — OFFICE VISIT (OUTPATIENT)
Dept: INTERNAL MEDICINE | Facility: CLINIC | Age: 83
End: 2023-02-09
Payer: MEDICARE

## 2023-02-09 VITALS
HEIGHT: 61 IN | WEIGHT: 131.38 LBS | SYSTOLIC BLOOD PRESSURE: 116 MMHG | OXYGEN SATURATION: 98 % | HEART RATE: 51 BPM | BODY MASS INDEX: 24.8 KG/M2 | TEMPERATURE: 98 F | RESPIRATION RATE: 16 BRPM | DIASTOLIC BLOOD PRESSURE: 62 MMHG

## 2023-02-09 DIAGNOSIS — G45.9 TIA (TRANSIENT ISCHEMIC ATTACK): ICD-10-CM

## 2023-02-09 DIAGNOSIS — Z00.00 ENCOUNTER FOR MEDICARE ANNUAL WELLNESS EXAM: ICD-10-CM

## 2023-02-09 DIAGNOSIS — I10 HYPERTENSION, UNSPECIFIED TYPE: ICD-10-CM

## 2023-02-09 DIAGNOSIS — Z09 FOLLOW-UP EXAM: Primary | ICD-10-CM

## 2023-02-09 PROCEDURE — 99999 PR PBB SHADOW E&M-EST. PATIENT-LVL V: CPT | Mod: PBBFAC,HCNC,, | Performed by: NURSE PRACTITIONER

## 2023-02-09 PROCEDURE — 99214 PR OFFICE/OUTPT VISIT, EST, LEVL IV, 30-39 MIN: ICD-10-PCS | Mod: S$PBB,HCNC,, | Performed by: NURSE PRACTITIONER

## 2023-02-09 PROCEDURE — 99214 OFFICE O/P EST MOD 30 MIN: CPT | Mod: S$PBB,HCNC,, | Performed by: NURSE PRACTITIONER

## 2023-02-09 PROCEDURE — 99215 OFFICE O/P EST HI 40 MIN: CPT | Mod: PBBFAC,HCNC | Performed by: NURSE PRACTITIONER

## 2023-02-09 PROCEDURE — 99999 PR PBB SHADOW E&M-EST. PATIENT-LVL V: ICD-10-PCS | Mod: PBBFAC,HCNC,, | Performed by: NURSE PRACTITIONER

## 2023-02-09 RX ORDER — LOSARTAN POTASSIUM 50 MG/1
50 TABLET ORAL 2 TIMES DAILY
Qty: 30 TABLET | Refills: 0
Start: 2023-02-09 | End: 2023-02-09

## 2023-02-09 RX ORDER — LOSARTAN POTASSIUM 50 MG/1
50 TABLET ORAL 2 TIMES DAILY
Qty: 30 TABLET | Refills: 11
Start: 2023-02-09 | End: 2023-02-20 | Stop reason: SDUPTHER

## 2023-02-09 NOTE — PROGRESS NOTES
Leslie Wood  02/09/2023  [id number]    Angeles Carson MD  Patient Care Team:  Angeles Carson MD as PCP - General (Internal Medicine)  ABDULLAHI Israel MD as Consulting Physician (Pain Medicine)  Daniel Bonilla MD (Ophthalmology)          Visit Type:a scheduled routine follow-up visit    Chief Complaint:  No chief complaint on file.      History of Present Illness:     82 year old female presents for follow up after hospital admission 2/1/23 for Cerebrovascular Accident. Reported the patient woke up 2/1/23 and was not being able to speak, blurred vision, and urinary incontinence. Speech  resolved throughout the day. Patient last known well was 1/31/23 pt has hx of strokes. Pt was admitted for TIA. Pt reports a fall 2 days ago.    History:  Past Medical History:   Diagnosis Date    Arthritis     Cataract     GERD (gastroesophageal reflux disease)     Heart attack 05/18/2022    Heart attack 05/23/2022    Hyperlipidemia     Hypertension     Stroke      Past Surgical History:   Procedure Laterality Date    ADENOIDECTOMY      ADRENAL GLAND SURGERY      APPENDECTOMY      BACK SURGERY      fusion l 4-5 s 1,2,3  fusion l 2-3    CORONARY ANGIOGRAPHY N/A 5/20/2022    Procedure: ANGIOGRAM, CORONARY ARTERY;  Surgeon: Domingo Martins MD;  Location: [facility] CATH LAB;  Service: Cardiology;  Laterality: N/A;    CORONARY ANGIOGRAPHY N/A 5/24/2022    Procedure: ANGIOGRAM, CORONARY ARTERY;  Surgeon: Domingo Martins MD;  Location: [facility] CATH LAB;  Service: Cardiology;  Laterality: N/A;    EYE SURGERY      HEMORRHOID SURGERY      HERNIA REPAIR      HYSTERECTOMY      indirect lumbar decompression      percutaneous placement of interspinous extension blocker    LEFT HEART CATHETERIZATION Left 5/20/2022    Procedure: CATHETERIZATION, HEART, LEFT;  Surgeon: Domingo Martins MD;  Location: [facility] CATH LAB;  Service: Cardiology;  Laterality: Left;    TONSILLECTOMY       Family History   Problem Relation Age of Onset    Heart disease Mother      Hypertension Mother     Diabetes Mother     Hypertension Father     Kidney disease Father     Breast cancer Sister      Social History     Socioeconomic History    Marital status:    Tobacco Use    Smoking status: Never    Smokeless tobacco: Never   Substance and Sexual Activity    Alcohol use: No    Drug use: No    Sexual activity: Not Currently     Patient Active Problem List   Diagnosis    Abdominal pain    Essential hypertension    History of prediabetes    Elevated d-dimer    Back spasm    Upper back strain    Atypical chest pain    Prediabetes    Vitamin D deficiency    Anxiety and depression    Chronic back pain    Hyperlipidemia LDL goal <100    Osteopenia of multiple sites    Statin intolerance    Type 2 diabetes mellitus without complication, without long-term current use of insulin    Sacroiliitis, not elsewhere classified    Other forms of angina pectoris    COVID-19    Late effect of cerebrovascular accident (CVA)    Hemianopia of left eye    Other specified disorders involving the immune mechanism, not elsewhere classified    Hemiplegia and hemiparesis following cerebral infarction affecting left non-dominant side    Atherosclerosis of aorta    Other osteoporosis without current pathological fracture    Altered mental state    Stress-induced cardiomyopathy    Coronary-myocardial bridge    Effusion of right knee    Hemarthrosis of right knee    Intractable pain    Syncope and collapse    Bradycardia    Disorientation    TIA (transient ischemic attack)    Aphasia     Review of patient's allergies indicates:   Allergen Reactions    Acetaminophen     Amitriptyline     Hydrochlorothiazide      Causes muscle cramping    Lisinopril      hyperkalemia    Oxycodone     Percocet [oxycodone-acetaminophen] Other (See Comments)     Seizures    Belbuca [buprenorphine hcl] Nausea And Vomiting and Other (See Comments)     Black out     Codeine Nausea Only and Rash    Prazosin Other (See Comments)      dizziness       The following were reviewed at this visit: active problem list, medication list, allergies, family history, social history, and health maintenance.    Medications:  Current Outpatient Medications on File Prior to Visit   Medication Sig Dispense Refill    aspirin (ECOTRIN) 81 MG EC tablet Take 1 tablet (81 mg total) by mouth once daily. 90 tablet 3    atorvastatin (LIPITOR) 80 MG tablet Take 1 tablet by mouth every morning.      clopidogreL (PLAVIX) 75 mg tablet Take 1 tablet (75 mg total) by mouth once daily. for 21 days 21 tablet 0    DULoxetine (CYMBALTA) 30 MG capsule Take 30 mg by mouth once daily.      furosemide (LASIX) 20 MG tablet Take 1 tablet (20 mg total) by mouth once daily. 30 tablet 11    metoprolol succinate (TOPROL-XL) 25 MG 24 hr tablet Take 1 tablet (25 mg total) by mouth once daily. 90 tablet 3    NUCYNTA 50 mg Tab Take 1 tablet (50 mg total) by mouth every 8 (eight) hours as needed (severe pain). RESTART WHEN OK PER PAIN MANAGEMENT PROVIDER 1 tablet 0    [DISCONTINUED] losartan (COZAAR) 50 MG tablet Take 1 tablet (50 mg total) by mouth 2 (two) times a day. 30 tablet 0    ascorbic acid, vitamin C, (VITAMIN C) 500 MG tablet Take 1 tablet by mouth every morning.      NIFEdipine (ADALAT CC) 30 MG TbSR Take 1 tablet (30 mg total) by mouth once daily. 30 tablet 0    [DISCONTINUED] carvediloL (COREG) 6.25 MG tablet Take 1 tablet (6.25 mg total) by mouth 2 (two) times daily. TAKE (1) TABLET BY MOUTH 2 TIMES A DAY WITH MEALS 180 tablet 3    [DISCONTINUED] cloNIDine (CATAPRES) 0.1 MG tablet Take 1 tablet (0.1 mg total) by mouth 2 (two) times daily. To take an extra dose if BP >160/90 90 tablet 11    [DISCONTINUED] diclofenac sodium (VOLTAREN) 1 % Gel Apply 2 g topically 3 (three) times daily. 1 Tube 2    [DISCONTINUED] isosorbide mononitrate (IMDUR) 30 MG 24 hr tablet TAKE 1 TABLET BY MOUTH TWICE A DAY 60 tablet 3    [DISCONTINUED] metoprolol tartrate (LOPRESSOR) 25 MG tablet Take 1 tablet  (25 mg total) by mouth 3 (three) times daily. 90 tablet 0    [DISCONTINUED] nitroGLYCERIN (NITROSTAT) 0.4 MG SL tablet Place 1 tablet (0.4 mg total) under the tongue every 5 (five) minutes as needed for Chest pain. 7 tablet 0    [DISCONTINUED] valsartan (DIOVAN) 160 MG tablet TAKE 1 TABLET BY MOUTH TWICE A  tablet 0     No current facility-administered medications on file prior to visit.       Medications have been reviewed and reconciled with patient at this visit.  Barriers to medications reviewed with patient.    Adverse reactions to current medications reviewed with patient..    Over the counter medications reviewed and reconciled with patient.    Exam:  Wt Readings from Last 3 Encounters:   02/09/23 59.6 kg (131 lb 6.3 oz)   02/02/23 60.2 kg (132 lb 11.5 oz)   01/31/23 60 kg (132 lb 4.4 oz)     Temp Readings from Last 3 Encounters:   02/09/23 97.5 °F (36.4 °C) (Temporal)   02/02/23 98.3 °F (36.8 °C)   12/19/22 98.2 °F (36.8 °C)     BP Readings from Last 3 Encounters:   02/09/23 116/62   02/02/23 137/61   01/31/23 (!) 142/74     Pulse Readings from Last 3 Encounters:   02/09/23 (!) 51   02/02/23 (!) 55   01/31/23 78     Body mass index is 24.83 kg/m².      Review of Systems   Musculoskeletal:  Positive for back pain.   All other systems reviewed and are negative.  Physical Exam  Vitals and nursing note reviewed.   Constitutional:       Appearance: Normal appearance. She is obese.   HENT:      Head: Normocephalic and atraumatic.      Right Ear: Tympanic membrane, ear canal and external ear normal.      Left Ear: Tympanic membrane, ear canal and external ear normal.      Nose: Nose normal.      Mouth/Throat:      Mouth: Mucous membranes are moist.      Pharynx: Oropharynx is clear.   Eyes:      Extraocular Movements: Extraocular movements intact.      Conjunctiva/sclera: Conjunctivae normal.      Pupils: Pupils are equal, round, and reactive to light.   Cardiovascular:      Rate and Rhythm: Normal rate and  regular rhythm.      Pulses: Normal pulses.      Heart sounds: Normal heart sounds.   Pulmonary:      Effort: Pulmonary effort is normal.      Breath sounds: Normal breath sounds.   Abdominal:      General: Bowel sounds are normal.      Palpations: Abdomen is soft.   Musculoskeletal:         General: Tenderness present. Normal range of motion.      Cervical back: Normal range of motion and neck supple.   Skin:     General: Skin is warm and dry.      Capillary Refill: Capillary refill takes less than 2 seconds.   Neurological:      General: No focal deficit present.      Mental Status: She is alert and oriented to person, place, and time.   Psychiatric:         Mood and Affect: Mood normal.         Behavior: Behavior normal.         Thought Content: Thought content normal.         Judgment: Judgment normal.       Laboratory Reviewed ({Yes)  Lab Results   Component Value Date    WBC 7.95 02/02/2023    HGB 13.9 02/02/2023    HCT 43.9 02/02/2023     02/02/2023    CHOL 96 (L) 02/01/2023    TRIG 51 02/01/2023    HDL 47 02/01/2023    ALT 28 02/02/2023    AST 26 02/02/2023     02/02/2023    K 4.0 02/02/2023     02/02/2023    CREATININE 0.8 02/02/2023    BUN 16 02/02/2023    CO2 25 02/02/2023    TSH 1.171 02/01/2023    INR 1.0 02/02/2023    HGBA1C 5.8 (H) 02/01/2023       Diagnoses and all orders for this visit:    Follow-up exam    TIA (transient ischemic attack)    Hypertension, unspecified type  -     Discontinue: losartan (COZAAR) 50 MG tablet; Take 1 tablet (50 mg total) by mouth 2 (two) times a day.  -     losartan (COZAAR) 50 MG tablet; Take 1 tablet (50 mg total) by mouth 2 (two) times a day.            Care Plan/Goals: Reviewed    Goals    None       Diagnoses and all orders for this visit:    Follow-up exam    TIA (transient ischemic attack)    Hypertension, unspecified type  -     Discontinue: losartan (COZAAR) 50 MG tablet; Take 1 tablet (50 mg total) by mouth 2 (two) times a day.  -      losartan (COZAAR) 50 MG tablet; Take 1 tablet (50 mg total) by mouth 2 (two) times a day.       Follow up: Follow up if symptoms worsen or fail to improve.    After visit summary was printed and given to patient upon discharge today.  Patient goals and care plan are included in After Visit Summary.

## 2023-02-14 ENCOUNTER — CLINICAL SUPPORT (OUTPATIENT)
Dept: CARDIOLOGY | Facility: HOSPITAL | Age: 83
End: 2023-02-14
Attending: FAMILY MEDICINE
Payer: MEDICARE

## 2023-02-14 ENCOUNTER — TELEPHONE (OUTPATIENT)
Dept: INTERNAL MEDICINE | Facility: CLINIC | Age: 83
End: 2023-02-14
Payer: MEDICARE

## 2023-02-14 DIAGNOSIS — I10 HYPERTENSION, UNSPECIFIED TYPE: ICD-10-CM

## 2023-02-14 PROCEDURE — 93272 CARDIAC EVENT MONITOR (CUPID ONLY): ICD-10-PCS | Mod: ,,, | Performed by: INTERNAL MEDICINE

## 2023-02-14 PROCEDURE — 93272 ECG/REVIEW INTERPRET ONLY: CPT | Mod: ,,, | Performed by: INTERNAL MEDICINE

## 2023-02-14 PROCEDURE — 93270 REMOTE 30 DAY ECG REV/REPORT: CPT

## 2023-02-14 RX ORDER — LOSARTAN POTASSIUM 50 MG/1
50 TABLET ORAL 2 TIMES DAILY
Qty: 30 TABLET | Refills: 11 | Status: CANCELLED
Start: 2023-02-14 | End: 2024-02-14

## 2023-02-14 NOTE — TELEPHONE ENCOUNTER
----- Message from Fito Ga sent at 2/14/2023  1:54 PM CST -----  Contact: ariane Thakkar stated that at the last visit patient's potassium medication was not filled and she needs a refill on it. Ariane is unsure of drug name stated it is not listed on patient's chart anymore. Please call her back at [phone].        [facility] - [place], [place] - [address] [facility] [address] [id number] [address] [address]  Phone: [phone] Fax: [phone]               Thanks  DD

## 2023-02-17 ENCOUNTER — CLINICAL SUPPORT (OUTPATIENT)
Dept: SPEECH THERAPY | Facility: HOSPITAL | Age: 83
End: 2023-02-17
Attending: FAMILY MEDICINE
Payer: MEDICARE

## 2023-02-17 DIAGNOSIS — R29.6 FALLS FREQUENTLY: Primary | ICD-10-CM

## 2023-02-17 DIAGNOSIS — R47.01 APHASIA: ICD-10-CM

## 2023-02-17 PROCEDURE — 92523 SPEECH SOUND LANG COMPREHEN: CPT

## 2023-02-17 NOTE — PLAN OF CARE
[facility]  Speech Therapy Evaluation -Neurological Rehabilitation    Date: 2/17/2023     Name: Leslie Wood   MRN: [id number]    Therapy Diagnosis:   Encounter Diagnosis   Name Primary?    Aphasia     Physician: Sammy Juarez MD  Physician Orders: [id number] - AMB REFERRAL/CONSULT TO SPEECH THERAPY  Medical Diagnosis: R47.01 (ICD-10-CM) - Aphasia    Visit # / Visits Authorized:  1 / 1   Date of Evaluation:  2/17/2023   Insurance Authorization Period:   Plan of Care Certification:    2/17/2023 to 4/12/2023      Time In:9:40    Time Out: 10:50    Procedure Min.   Speech Language Evaluation  70      Precautions: Standard and Fall  Subjective   Date of Onset: 2/1/23 (Patient has a had history of previous strokes)  History of Current Condition:   Patient reported that she has had multiple strokes. Her most recent suspected cerebral vascular accident was on 2/1/2023. However, patient unable to receive an MRI to confirm. At this time, she reported difficulty with word finding and writing. Patient's granddaughter reported that patient becomes confused intermittently. Patient has recently fallen and reports weakness on her right side, however she is unsure if it due to her fall or her stroke. Of note, patient is left handed.    Past Medical History: Leslie Wood  has a past medical history of Arthritis, Cataract, GERD (gastroesophageal reflux disease), Heart attack (05/18/2022), Heart attack (05/23/2022), Hyperlipidemia, Hypertension, and Stroke.  Leslie Wood  has a past surgical history that includes Back surgery; Eye surgery; Appendectomy; Adrenal gland surgery; Hemorrhoid surgery; Hernia repair; Hysterectomy; Tonsillectomy; Adenoidectomy; indirect lumbar decompression; Left heart catheterization (Left, 5/20/2022); Coronary angiography (N/A, 5/20/2022); and Coronary angiography (N/A, 5/24/2022).  Medical Hx and Allergies: Leslie has a current medication list which includes the following prescription(s):  "ascorbic acid (vitamin c), aspirin, atorvastatin, clopidogrel, duloxetine, furosemide, losartan, metoprolol succinate, nifedipine, nucynta, [DISCONTINUED] carvedilol, [DISCONTINUED] clonidine, [DISCONTINUED] diclofenac sodium, [DISCONTINUED] isosorbide mononitrate, [DISCONTINUED] metoprolol tartrate, [DISCONTINUED] nitroglycerin, and [DISCONTINUED] valsartan.   Review of patient's allergies indicates:   Allergen Reactions    Acetaminophen     Amitriptyline     Hydrochlorothiazide      Causes muscle cramping    Lisinopril      hyperkalemia    Oxycodone     Percocet [oxycodone-acetaminophen] Other (See Comments)     Seizures    Belbuca [buprenorphine hcl] Nausea And Vomiting and Other (See Comments)     Black out     Codeine Nausea Only and Rash    Prazosin Other (See Comments)     dizziness     Prior Therapy:  N/a  Social History:  Lives with her daughter.   Prior Level of Function: Patient relies on her daughter and granddaughter to drive her places and assist her.   Current Level of Function: Difficulty with word finding.  Pain: 0/10  Pain Location / Description: n/a  Nutrition:  Typical PO diet  Patient's Therapy Goals:  To improve functional communication     Relevant Imagin2023 CTA Head and Neck with  impression per Juan Galvan MD:   "Impression:  Chronic right posterior cerebral artery occlusion with right posterior cerebral artery territory infarct  Dense atherosclerotic plaque of the right and of the left cavernous ICA segments.  Bilateral common carotid artery bifurcation calcified plaque with low-grade 20% or less right internal carotid artery origin stenosis."    Objective     Formal Assessment:  Western Aphasia Battery - Revised (WAB-R) was administered to evaluate the patient's receptive and expressive language function.The purpose stated in the manual for the WAB-R is to determine the presence, severity, and type of aphasia; measure the patient's level of performance to provide a baseline " for detecting change over time; provide a comprehensive assessment of the patients language strengths and deficits in order to guide treatment and management; infer the location and etiology of the lesion causing the aphasia.  The following results were revealed:     Spontaneous Speech Score:   Auditory Comprehension Score: 9.45 / 10  Repetition Score:   8.2 /10  Naming and Word Finding Score: 8.7/ 10  Aphasia Quotient (AQ):   90.7 / 100  Aphasia Classification: Mild Anomic aphasia     Subtest Results:   Information Content: 10 / 10  Fluency, Grammatical Competence, and Paraphasias: 9 /10  Yes / No Questions: 57 / 60  Auditory Word Recognition: 60 / 60  Sequential Commands: 72 / 80  Repetition: 82 /100  Object Namin 60  Word Fluency:   Sentence Completion: 10 /10  Responsive Speech: 10 / 10    Description: Patient presents with mild anomic aphasia characterized by mildly impaired naming and word finding abilities. Although the patient was able to name and recall objects during the assessment, she took several seconds to come up with the name. Of note, patient demonstrated increased difficulty with wording finding during more complex/conversational tasks. Patient's difficulty with word fluency could be potentially a result of attention deficits. Further assessment regarding cognitive-communication skills will be completed next session.     Rehabilitation [facility] ([facility]) Evaluation of Communication Problems in Right Hemisphere Dysfunction-3rd Edition (RICE-3)  The RICE-3 includes 5 subtests evaluating right hemisphere cognitive-communication deficits that have clinical relevance to rehabilitation including behavior observation profile, pragmatic skills, narrative discourse, visual scanning and tracking, assessment of writing, and metaphorical language. The Administration Manual details administration, scoring and interpretation of results and provides severity ratings for each subtest.      Subtest Score Severity   Behavioral Observational Profile 21/24 Normal   Rating Scale of Pragmatic Communication Skills 40/40 Normal   Visual Scanning and Tracking-Accuracy 61 Moderate   Visual Scanning and Tracking-Rate 362 Mild    Assessment and Analysis of Writing  18/24 Moderate   Metaphorical Language Test  20/30 Normal      Patient's performance on the RICE-3 was significant for mild to moderate deficits in visual scanning and tracking accuracy and rate as well as moderate deficits in writing. She was able to write a sentence by copying and writing a word from clinician dictation. However, when she was asked to write a short story, she initially wrote one sentence and was cued to continue on. She then only four short sentences about buying a dress. This significantly impacted her overall writing score. Thus, the deficits in writing appear to be related to word finding difficulties, as she stated, as opposed to visual neglect.     Of note, patient was given the WAB-R and RICE due to her reported deficits and limited imaging resulting in unknown site(s) of lesion. Word finding difficulty was reported by patient and observed in informal assessment, warranting an aphasia battery such as the WAB-R. However, patient has a known previous right hemisphere cerebral vascular accident. Given this previous cerebral vascular accident and limited imaging regarding the most recent cerebral vascular accident, we administered the RICE-3 to assess right hemisphere dysfunction.     Treatment   Total Treatment Time Separate from Evaluation: not applicable   no treatment performed secondary to time to complete evaluation.    Education: Plan of Care and role of SLP in care were discussed with patient. Patient and family members expressed understanding.     Home Program: Not yet established.  Assessment     Leslie presents to [facility] status post medical diagnosis of Aphasia. mild anomic aphasia. She presents  with suspected mild anomic aphasia and a mild cognitive communication deficit characterized by difficulties with word finding, visual scanning and tracking, and writing. Patient will benefit from additional cognitive-communication assessment to further address her cognitive-communication skills. She demonstrates impairments including limitations as described in the problem list.  Positive prognostic factors include recent onset of deficits, family support, and motivation to improve . Negative prognostic factors include history of multiple strokes and limited imaging to support etiology of deficits. Barriers to therapy include transportation. Patient will benefit from skilled, outpatient neurological rehabilitation speech therapy.    Rehab Potential: good  Patient's spiritual, cultural, and educational needs considered and patient agreeable to plan of care and goals.    Short Term Goals (6 weeks):   APHASIA GOALS:      Oral Expression:   Patient will describe functional objects/images to facilitate a strategy of description in order to increase transfer of intended message with 80% accuracy and moderate clinician cueing.   Patient will complete word finding task (i.e. Creating subject, verb, object pairs in VNEST protocol) with 90% accuracy moderate assistance to improve word fluency.  Patient will utilize word finding strategies in structured tasks with 70% accuracy and minimal cues.   Patient will summarize read material to improve word fluency, word finding, and reading comprehension in Attentive Reading and Constrained Summarization (ARCS) protocol with 80% accuracy and minimal verbal assistance across 2-3 sessions.    Writing  Patient will write 7-10 word sentences with no more than 2 syntactic/grammatical errors given minimal cueing.   Patient will complete functional writing task with complete, coherent, and accurately spelled responses with 80% accuracy and minimal verbal and visual assistance across 3-5  sessions.    COGNITIVE GOALS  Patient will complete RBANS assessment to further assess cognitive communication skills.        Long Term Goals (12 weeks):   Patient will improve functional communication in order to improve quality of life, participate in social and community interaction and to communicate an urgent message if needed.   Patient will improve cognitive-communication skills in order to improve quality of life, participate in social and community interaction and to communicate an urgent message if needed.     Plan     Plan of Care Certification Period: 2/17/2023 to 4/12/2023    Recommended Treatment Plan:  Patient will participate in the [facility] rehabilitation program for speech therapy 1-2 times per week for 12 weeks to address her Communication and Cognition deficits, to educate patient and their family, and to participate in a home exercise program.    Other Recommendations: PT and OT evaluation.    Therapist's Name:   ADWOA Patterson   2/17/2023     I CERTIFY THE NEED FOR THESE SERVICES FURNISHED UNDER THIS PLAN OF TREATMENT AND WHILE UNDER MY CARE    Physician Name: _______________________________    Physician Signature: ____________________________

## 2023-02-17 NOTE — PROGRESS NOTES
See initial POC.     Becca Blanco, CCC-SLP, CBIS   Speech Language Pathologist   Certified Brain Injury Specialist   2/17/2023

## 2023-02-20 DIAGNOSIS — I10 HYPERTENSION, UNSPECIFIED TYPE: ICD-10-CM

## 2023-02-20 RX ORDER — LOSARTAN POTASSIUM 50 MG/1
50 TABLET ORAL 2 TIMES DAILY
Qty: 30 TABLET | Refills: 11
Start: 2023-02-20 | End: 2023-02-23 | Stop reason: SDUPTHER

## 2023-02-20 RX ORDER — ATORVASTATIN CALCIUM 80 MG/1
80 TABLET, FILM COATED ORAL EVERY MORNING
Qty: 30 TABLET | Refills: 5 | Status: SHIPPED | OUTPATIENT
Start: 2023-02-20 | End: 2023-02-23 | Stop reason: SDUPTHER

## 2023-02-20 NOTE — TELEPHONE ENCOUNTER
----- Message from Rivka Hendricks sent at 2/20/2023 10:28 AM CST -----  Contact: Ariane/ Granddaughter  Ariane is calling to request a refill on the medication losartan (COZAAR) 50 MG tablet, she stated the patient is completely out. Please give her a call back to confirm at , she use the [facility]    Thanks  JOSHUA

## 2023-02-20 NOTE — TELEPHONE ENCOUNTER
Spoke with pt's granddaughter in regards to prescription being ordered. Notified her once approved refill will be sent to pt's pharmacy.               ----- Message from Rivka Hendricks sent at 2/20/2023 10:31 AM CST -----  Contact: Ariane/ Granddaughter  Ariane is calling to request a refill on the medication atorvastatin (LIPITOR) 80 MG tablet, she stated the patient is completely out, She use [facility]. Please call her to confirm at     Thanks  LJ

## 2023-02-23 ENCOUNTER — TELEPHONE (OUTPATIENT)
Dept: NEUROLOGY | Facility: CLINIC | Age: 83
End: 2023-02-23
Payer: MEDICARE

## 2023-02-23 DIAGNOSIS — I10 HYPERTENSION, UNSPECIFIED TYPE: ICD-10-CM

## 2023-02-23 RX ORDER — LOSARTAN POTASSIUM 50 MG/1
50 TABLET ORAL 2 TIMES DAILY
Qty: 30 TABLET | Refills: 11
Start: 2023-02-23 | End: 2024-02-23

## 2023-02-23 RX ORDER — ATORVASTATIN CALCIUM 80 MG/1
80 TABLET, FILM COATED ORAL EVERY MORNING
OUTPATIENT
Start: 2023-02-23

## 2023-02-23 RX ORDER — ATORVASTATIN CALCIUM 80 MG/1
80 TABLET, FILM COATED ORAL EVERY MORNING
Qty: 30 TABLET | Refills: 5 | OUTPATIENT
Start: 2023-02-23

## 2023-02-23 NOTE — TELEPHONE ENCOUNTER
I spoke with patient granddaughter Ariane in regards to appointment. I advised her that the appointment needed to be rescheduled because patient needed more time. For a follow up TIA and I advised that the appointment was 30 min. She did verbalized understanding and wanted to know if she can get with anyone else. I advised her I would send a message to our CVA specialist. Will stay in touch with ariane for sooner appt.

## 2023-02-23 NOTE — TELEPHONE ENCOUNTER
----- Message from lAisson Barragan MA sent at 2/23/2023 10:52 AM CST -----  Pt scheduled 3/14/23 at 9 am with Dr. Rao Vascular surgeon.   ----- Message -----  From: Sp Wright MA  Sent: 2/23/2023   8:14 AM CST  To: Alisson Barragan MA    Good morning, I spoke with patient care giver Ariane, do you think we can get patient in to see  next time he comes in?   ----- Message -----  From: Rhonda Melvin  Sent: 2/23/2023   7:25 AM CST  To: Monty Calixto Staff    States she had an appt today and its was cancel. States she wants to know why, it was cancel. States she spoke to someone yesterday. States she had a stroke. States she would like someone to give her a call back. Please call Ariane [phone]. Thank you

## 2023-02-23 NOTE — TELEPHONE ENCOUNTER
----- Message from Rhonda Melvin sent at 2/23/2023  7:19 AM CST -----  States she had an appt today and its was cancel. States she wants to know why, it was cancel. States she spoke to someone yesterday. States she had a stroke. States she would like someone to give her a call back. Please call Ariane [phone]. Thank you

## 2023-02-24 NOTE — TELEPHONE ENCOUNTER
----- Message from Sudha Hill sent at 2/24/2023 11:32 AM CST -----  Contact: Megan calling [facility] [phone]  Pharmacy is calling to clarify an RX.    RX name:  -  1.losartan (COZAAR) 50 MG tablet --30-days--    What do they need to clarify:  --Refill request--    Comments:  Pt would like to see if it can be sent today. I informed of the 72 hour policy, but pt out of the medication. Please call to advise.       [facility] [place] - [address] [facility] [address] [id number] [facility] [address]  [facility] [address]  Phone: [phone] Fax: [phone]

## 2023-02-27 ENCOUNTER — CLINICAL SUPPORT (OUTPATIENT)
Dept: REHABILITATION | Facility: HOSPITAL | Age: 83
End: 2023-02-27
Attending: FAMILY MEDICINE
Payer: MEDICARE

## 2023-02-27 ENCOUNTER — CLINICAL SUPPORT (OUTPATIENT)
Dept: REHABILITATION | Facility: HOSPITAL | Age: 83
End: 2023-02-27
Payer: MEDICARE

## 2023-02-27 DIAGNOSIS — I10 HYPERTENSION, UNSPECIFIED TYPE: ICD-10-CM

## 2023-02-27 DIAGNOSIS — R41.841 COGNITIVE COMMUNICATION DISORDER: ICD-10-CM

## 2023-02-27 DIAGNOSIS — Z74.09 IMPAIRED FUNCTIONAL MOBILITY, BALANCE, GAIT, AND ENDURANCE: ICD-10-CM

## 2023-02-27 DIAGNOSIS — R29.6 FALLS FREQUENTLY: ICD-10-CM

## 2023-02-27 PROCEDURE — 97162 PT EVAL MOD COMPLEX 30 MIN: CPT

## 2023-02-27 PROCEDURE — 97130 THER IVNTJ EA ADDL 15 MIN: CPT

## 2023-02-27 PROCEDURE — 97129 THER IVNTJ 1ST 15 MIN: CPT

## 2023-02-27 RX ORDER — LOSARTAN POTASSIUM 50 MG/1
50 TABLET ORAL 2 TIMES DAILY
Qty: 180 TABLET | Refills: 1
Start: 2023-02-27 | End: 2023-02-28 | Stop reason: SDUPTHER

## 2023-02-27 NOTE — PROGRESS NOTES
[facility]  Speech Therapy Treatment Note- Neurological Rehabilitation  Date: 2/27/2023     Name: Leslie Wood   MRN: [id number]   Therapy Diagnosis:   Encounter Diagnosis   Name Primary?    Cognitive communication disorder      Physician: Sammy Juarez MD  Physician Orders: [id number] - AMB REFERRAL/CONSULT TO SPEECH THERAPY  Medical Diagnosis: R47.01 (ICD-10-CM) - Aphasia    Visit #/ Visits Authorized: 1/ 1  Date of Evaluation:  2/17/2023   Insurance Authorization Period: 2/23/2023 - 12/31/2023   Plan of Care Expiration Date:    4/12/2023   Extended Plan of Care:  NA   Progress Note: 3/20/2023    Visits Cancelled: 0  Visits No Show: 0    Time In:  9:45 AM   Time Out:  10:45 AM   Total Billable Time: 60 minutes      Precautions: Standard  Subjective:   Patient reports: No significant changes since last session.    She was compliant to home exercise program.   Response to previous treatment: First treatment session today.    Pain Scale:  8/10 on a Visual Analog Scale currently.   Pain Location: bilateral lower back   Objective:   TIMED  Procedure Min.   Cognitive Therapeutic Interventions, first 15 minutes CPT 84189  15   Cognitive Therapeutic Interventions, each additional 15 minutes CPT 42311  45         UNTIMED  Procedure Min.   Speech- Language- Voice Therapy  0     Total Timed Units: 4  Total Untimed Units: 0  Charges Billed/Number of units: 04510/1; 96693/3    Short Term Goals: (6 weeks) Current Progress:   Patient will describe functional objects/images to facilitate a strategy of description in order to increase transfer of intended message with 80% accuracy and moderate clinician cueing.     Progressing/ Not Met 2/27/2023   Not formally addressed.     Patient will complete word finding task (i.e. Creating subject, verb, object pairs in VNEST protocol) with 90% accuracy moderate assistance to improve word fluency.     Progressing/ Not Met 2/27/2023   Not formally addressed.      Patient will utilize  word finding strategies in structured tasks with 70% accuracy and minimal cues.      Progressing/ Not Met 2/27/2023   Education provided on word finding strategies such as circumlocution. Patient and clinician practiced this strategy with a picture that she was unable to name during the RBANS assessment (sotero). Patient verbalized understanding of all discussed.     Patient will summarize read material to improve word fluency, word finding, and reading comprehension in Attentive Reading and Constrained Summarization (ARCS) protocol with 80% accuracy and minimal verbal assistance across 2-3 sessions.     Progressing/ Not Met 2/27/2023   Not formally addressed.      Patient will write 7-10 word sentences with no more than 2 syntactic/grammatical errors given minimal cueing.      Progressing/ Not Met 2/27/2023   Not formally addressed.      Patient will complete functional writing task with complete, coherent, and accurately spelled responses with 80% accuracy and minimal verbal and visual assistance across 3-5 sessions.     Progressing/ Not Met 2/27/2023   Not formally addressed.      Patient will complete RBANS assessment to further assess cognitive communication skills.        GOAL MET 2/27/2023  Completed today. See below for results.       GOAL MET 2/27/2023        The Repeatable Battery for the Assessment of Neuropsychological Status (RBANS) Version A was administered. The RBANS is a brief, individually administered battery to measure cognitive decline or improvement. It covers five domains including immediate memory, visuospatial/constructional, language, attention, and delayed memory. The results are outlined below:    Domain Subtest Total Score Index Score Percentile Qualitative Description   Immediate Memory List Learning 6   49   <.1   Extremely Low     Story Memory 3      Visuospatial/  Constructional Figure Copy 12   81   10   Low Average    Line Orientation 16      Language Picture Naming 7   57   .2  "  Extremely Low     Semantic Fluency 5      Attention Digit Span 6   60   .4   Extremely Low     Coding 18        Delayed Memory List Recall 0     40     <.1     Extremely Low     List Recognition 12       Story Recall 0       Figure Recall 0          Total Scale   50     Percentile   <.1     Descriptor   Extremely Low    Interpretation:  Scaled score: mean of 10, standard deviation of 3. Therefore, 16+ = Very Superior; 14-15 = Superior; 12-13 = High Average; 8-11 = Average; 6-7 = Low Average; 4-5 = Borderline; 3 and below = Extremely Low    Index score: mean of 100, standard deviation of 15. Therefore, 130+ = Very Superior; 120-129 = Superior; 110-119 = High average;  = Average; 80-89 = Low Average; 70-79 = Borderline; 69 and below = Extremely Low. Apparently the most reliable score; factor in education level.    Overall, according to the RBANS research, total Scale index is a good indicator of general cognitive functioning. The patient presents with a moderate-severe cognitive communication disorder charaterized by deficits in attention, immediate and delayed memory and language skills. The patient presented with significant difficulty on both immediate recall tasks, often stating "I can't remember." She became tearful during the delayed memory tasks as she was unable to recall any words from the word list or any details from the story.   During the naming sub-test, the patient was unable to name three of the ten items. She reported "I know what it is but I can't say it." She was able to describe the items and identify categories. For example, she was able to the label the giraffe as an animal, but was unable to name it correctly.     Patient Education/Response:   Patient and her granddaughter were educated on the hierarchy of neuropsychological functions and how deficits in attention are likely to impact memory. They verbalized understanding of all discussed.     Home program established: yes-Patient " provided word finding strategy handout  Exercises were reviewed and Leslie was able to demonstrate them prior to the end of the session.  Leslie demonstrated good  understanding of the education provided.     See Electronic Medical Record under Patient Instructions for exercises provided throughout therapy.  Assessment:   Leslie is progressing well towards her goals. She has completed several different assessments due to unknown site of lesion and reported deficits in attention, memory, and language. Given completion of the RBANS today, new goals have been added below:     COGNITIVE GOALS  Attention  Patient will sustain attention to a moderate task (auditory or visual) for 2 minutes with 90% accuracy or no more than 1 redirection.   Patient will complete selective attention tasks (auditory or visual) with 90% accuracy independently increase selective attention.  Patient will use attention shifting strategies to shift attention between two tasks (auditory or visual) with no more than 3 cues or 90% accuracy to improve alternating attention.    Memory:  Patient will complete short term recall tasks after a 5 minutes delay with 90% accuracy  independently  with use of memory strategies to improve recall of information and generalization of memory strategies.     Executive Functions/Problem Solving:  Patient will use Goal Plan Action Review strategy to complete moderate to complex reasoning, planning, or organization tasks with 90% accuracy independently to improve functional executive function skills.  Patient will complete a functional task to improve attention, memory and/or executive functions (I.e. sample bill paying activity, recipe, or multiple choice comprehension questions to 1 paragraphs) with 80% accuracy and natural environment noise distractions (TV news background, music, etc.).    Patient/Caregiver Education:   Patient will be educated regarding cognitive deficits as applicable to the patient's life for  increased awareness and will execute returned demonstration of information with 80% accuracy.     Indirect/External Strategies  Patient will independently generate strategies to improve ability to complete tasks with enhanced accuracy and time, based on review of objective previous performance on trials of functional tasks with 80% accuracy.   Given implementation of strategies, patient will complete a task following initial attempt with 90% accuracy and reported reduced number on the relative scale of cognitive load when compared to previous trial.   Patient will use external memory strategies in order to recall important dates, events, appointments, or tasks to be completed with 90% accuracy independently.       Patient prognosis is Good. Patient will continue to benefit from skilled outpatient speech and language therapy to address the deficits listed in the problem list on initial evaluation, provide patient/family education and to maximize patient's level of independence in the home and community environment.   Medical necessity is demonstrated by the following IMPAIRMENTS:  Language deficits impact overall quality of life, ability to participation in social and community interactions, and the ability to communicate important medical information if needed.   Cognitive-communication deficits impact overall quality of life and the ability to safely and independently complete activities of daily living.   Barriers to Therapy: none   Patient's spiritual, cultural and educational needs considered and patient agreeable to plan of care and goals.  Plan:   Continue Plan of Care with focus on attention, executive functions, language.     Becca Blanco, CCC-SLP, CBIS   Speech Language Pathologist   Certified Brain Injury Specialist   2/27/2023

## 2023-02-27 NOTE — TELEPHONE ENCOUNTER
No new care gaps identified.  [facility] Embedded Care Gaps. Reference number: [id number]. 2/27/2023   4:50:10 PM CST

## 2023-02-27 NOTE — TELEPHONE ENCOUNTER
LOV: 02/09/2023 Sepideh Peters NP    Can we send in #[phone] for a 90 day supply, pt takes twice a day

## 2023-02-27 NOTE — PLAN OF CARE
[facility]   Physical Therapy Initial Evaluation     Date: 2/27/2023   Name: Leslie Wood  Clinic Number: [id number]    Therapy Diagnosis:   Encounter Diagnoses   Name Primary?    Falls frequently     Impaired functional mobility, balance, gait, and endurance      Physician: Sepideh Peters,*    Physician Orders: PT Eval and Treat   Medical Diagnosis from Referral: R29.6 (ICD-10-CM) - Falls frequently  Evaluation Date: 2/27/2023  Authorization Period Expiration: 2/17/24  Plan of Care Expiration: 4/28/23  Progress Note Due: 3/27/23  Visit # / Visits authorized: 1/ 1  FOTO: 0/3 time constraints    Precautions: Standard and Fall     Time In: 0900  Time Out: 0945  Total Appointment Time (timed & untimed codes):  45 minutes      SUBJECTIVE   Date of onset: 2/1/23 her latest CVA (Patient had a previous CVA in August 2020)    HisMedical History:   Past Medical History:   Diagnosis Date    Arthritis     Cataract     GERD (gastroesophageal reflux disease)     Heart attack 05/18/2022    Heart attack 05/23/2022    Hyperlipidemia     Hypertension     Stroke        Surgical History:   Leslie Wood  has a past surgical history that includes Back surgery; Eye surgery; Appendectomy; Adrenal gland surgery; Hemorrhoid surgery; Hernia repair; Hysterectomy; Tonsillectomy; Adenoidectomy; indirect lumbar decompression; Left heart catheterization (Left, 5/20/2022); Coronary angiography (N/A, 5/20/2022); and Coronary angiography (N/A, 5/24/2022).    Medications:   Leslie has a current medication list which includes the following prescription(s): ascorbic acid (vitamin c), aspirin, atorvastatin, clopidogrel, duloxetine, furosemide, losartan, metoprolol succinate, nifedipine, nucynta, [DISCONTINUED] carvedilol, [DISCONTINUED] clonidine, [DISCONTINUED] diclofenac sodium, [DISCONTINUED] isosorbide mononitrate, [DISCONTINUED] metoprolol tartrate, [DISCONTINUED] nitroglycerin, and [DISCONTINUED]  valsartan.    Allergies:   Review of patient's allergies indicates:   Allergen Reactions    Acetaminophen     Amitriptyline     Hydrochlorothiazide      Causes muscle cramping    Lisinopril      hyperkalemia    Oxycodone     Percocet [oxycodone-acetaminophen] Other (See Comments)     Seizures    Belbuca [buprenorphine hcl] Nausea And Vomiting and Other (See Comments)     Black out     Codeine Nausea Only and Rash    Prazosin Other (See Comments)     dizziness        History of current condition: Leslie reports: [from ST evaluation] -   that she has had 1 previous stroke with slight weakness of Left leg. Her most recent suspected cerebral vascular accident was on 2/1/2023. However, patient unable to receive an MRI to confirm. At this time, she reported difficulty with word finding and writing. Patient's granddaughter reported that patient becomes confused intermittently. Patient has recently fallen and reports weakness on her right side, however she is unsure if it due to her fall or her stroke. Of note, patient is left handed.     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  Bilateral common carotid artery bifurcation calcified plaque with low-grade 20% or less right internal carotid artery origin stenosis.    Prior Level of Function: SNF and Home health     Current Level of Function: She has an appointment to follow up with the orthopedic MD 3/2/23. Has supervision with bathing and uses shower chair.  She is able to dress herself, toilets on her own but may need help standing at times.     Falls: several falls and 4 months ago she hit her head and Right knee causing her to go to the hospital and falls 1x/month.   She had a fracture  suspected with Right knee and had a splint and went to SNF for therapy and had home health afterwards  where the splint was weaned off.     Prior Therapy: none    Family Present at Eval: granddaughter    Social History:  lives with their daughter and granddaughter    Occupation: retired    Exercise Routine/History: none, but is active with walking during the day in her house.     Home Environment: 1 story home, 1 threshold step    DME: shower chair, grab bars in bathroom and down hallway, rollator, 2 wheeled walker, 3 point cane    Orthotics: Right knee splints     Pain:Current 9/10, worst 10/10, best 2/10   Location: low back    Description: Sharp, burning  Aggravating Factors: Sitting for a prolonged period  Easing Factors: first thing in the morning    Patients goals: to improve her balance and decrease risk of falls.        OBJECTIVE   STRENGTH:    L/E MMT Right   ROM Left ROM   Hip Flexion  3+/5 Within Functional Limits throughout lower extremity  3+/5 Within Functional Limits throughout lower extremity    Hip Extension  3-/5  3-/5    Hip Abduction  4/5 in sitting  4/5 in sitting    Knee Extension 3+/5  4/5    Knee Flexion 3+/5  4-/5    Ankle DF 4+/5  4+/5    Ankle PF 5/5  5/5      Posture:  Patient presents with postural abnormalities which include:    [x] Forward Head   [] Increased Lumbar Lordosis   [x] Rounded Shoulder   [] Flat Back Posture   [] Increased Thoracic Kyphosis [] Pes Planus   [] Increased Trunk Sway  [] Valgus knee position   [] Increased Trunk Rotation  [] Varus knee position   [] Increased cervical lordosis [x] Posterior Pelvic Tilt    [x] Other: Able to correct with verbal cues, constantly changing positons due to low back pain.     Balance:  Patient's balance was observed at the following level:  Sit Static: Independent  Sit Dynamic:Independent to supervision  Stand Static: Minimum Assist without hand support  Stand Dynamic: Moderate Assist without hand support    Gait Analysis: The patient ambulated with the following assistive device: rollator; the pt presents with the following gait  abnormalities: occasional unsteady gait, decreased weight shift, and flexed posturing     Sensation:  Type Response Comments   Light Touch I Hyposensativity with Left thigh    Localization N    N = Normal, I = Impaired, A = Absent    Coordination:  Type Response Left  Response Right  Comments   Alternating Toe Tap I I Slow and unsteady   Heel to Shin I I Slow and unsteady         Functional Assessments:  Test Score Norms   TUG 15.35 seconds < 10 sec = Normal  < 20 sec = Good mobility, can go out alone, mobile without assistive device  < 30 sec = Problems, cannot go outside alone, requires gait aid  > 14 sec indicates a gunjan risk for falls     5 x Sit to Stand 23.55 seconds Geriatrics:  > 12 sec = need for further assessment of fall risk  > 15 sec = recurrent falls  Vestibular Disorders:  > 15 sec = fall risk  Parkinson's:  > 16 sec = fall risk   4 Square Step Test 26 seconds          Limitation/Restriction for FOTO  Survey    Therapist reviewed FOTO scores for Leslie Wood on 2/27/2023.   FOTO documents entered into [employer] - see Media section.    Limitation Score: %         TREATMENT     Total Treatment time (time-based codes) separate from Evaluation: 0 minutes     PATIENT EDUCATION AND HOME EXERCISES     Education provided:   - Pt educated on Plan of Care for PT.    Written Home Exercises Provided: Home Exercise Program will be given after further assessment at upcoming session.  Leslie verbalized good  understanding of the education provided. See EMR under Patient Instructions for exercises provided during future therapy sessions.    ASSESSMENT   Leslie is a 82 y.o. female referred to outpatient Physical Therapy with a medical diagnosis of R29.6 (ICD-10-CM) - Falls frequently. The patient presents with signs and symptoms consistent with diagnosis along with impairments which include weakness, impaired endurance, impaired sensation, impaired self care skills, impaired functional mobility, gait instability, impaired  balance, impaired cognition, decreased coordination, decreased lower extremity function, decreased safety awareness, pain, and impaired coordination.  These impairments are limiting patient's ability to safely perform her daily responsibilities without a risk for falls.  She is limited by her cognitive deficits that also contributes to her decreased safety with mobility.  She has potential to progress with continued PT treatment.      Patient prognosis is Fair to Good depending on carryover of gains  Patient will benefit from skilled outpatient Physical Therapy to address the deficits stated above and in the chart below, provide patient /family education, and to maximize patient's level of independence.     Plan of care discussed with patient: Yes  Patient's spiritual, cultural and educational needs considered and patient is agreeable to the plan of care and goals as stated below:     Anticipated Barriers for therapy: co-morbidities, sedentary lifestyle, chronicity of condition, and lack of understanding of condition    Medical Necessity is demonstrated by the following  History  Co-morbidities and personal factors that may impact the plan of care Co-morbidities:   Arthritis   Cataract   GERD (gastroesophageal reflux disease)   Heart attack   Heart attack   Hyperlipidemia   Hypertension   Stroke       Personal Factors:   age     high   Examination  Body Structures and Functions, activity limitations and participation restrictions that may impact the plan of care Body Regions:   head  back  lower extremities  upper extremities  trunk    Body Systems:    gross symmetry  ROM  strength  gross coordinated movement  balance  gait  transfers  transitions  motor control  motor learning  heart rate  respiratory rate  blood pressure    Participation Restrictions:   Dependent on transportation    Activity limitations:   Learning and applying knowledge  solving problems    General Tasks and Commands  undertaking multiple  tasks    Communication  Following verbal and modeling instructions are difficult at times.    Mobility  lifting and carrying objects  walking  driving (bike, car, motorcycle)    Self care  toileting  looking after one's health    Domestic Life  shopping  cooking  doing house work (cleaning house, washing dishes, laundry)  assisting others    Interactions/Relationships  complex interpersonal interactions  formal relationships  family relationships    Life Areas  basic economic transactions    Community and Social Life  community life  recreation and leisure  Yarsanism and spirituality         high   Clinical Presentation evolving clinical presentation with changing clinical characteristics moderate   Decision Making/ Complexity Score: moderate     GOALS:    Short Term Goals:  4 weeks Progress    Function: Patient will demonstrate improved function as indicated by a functional limitation score improved by 3 points from initial measure.  PC   Strength: Patient will demonstrate improved strength by performing 5x sit to  20 seconds in order to progress towards independence with functional activities.  PC   Balance: Complete Encarnacion Balance Scale and set goals accordingly.  PC   Coordination: Patient will demonstrate improved time of 13 sec on TUG to demonstrate improved coordination and safety with mobility. PC   HEP: Patient will demonstrate independence with HEP in order to progress toward functional independence.    Determine the need of Custom Wheelchair Eval to improve patient's positioning and independence with pressure reliefs and independence with home and community mobility to decrease burden of care.          Long Term Goals:  8 weeks Progress   Function: Patient will demonstrate improved function as indicated by a functional limitation score improved 5 points from initial measure.  PC   Strength: Patient will demonstrate improved strength by performing 5x sit to  17 seconds in order to progress  towards independence with functional activities.  PC   Patient will return to ADL's, IADL's, community involvement, recreational activities, and work-related activities with less than or equal to 5/10 pain and maximal function.  PC   Balance: Complete Encarnacion Balance Scale and set goals accordingly.  PC   Coordination: Patient will demonstrate improved time of 11 sec on TUG to demonstrate improved coordination and safety with mobility. PC   HEP: Patient educated on HEP in preparation for d/c verbalizing and demonstrating good understanding of topics discussed.   PC         Goals Key:  PC= progressing/continue; PM= partially met;        DC= discontinue      PLAN   Plan of care Certification: 2/27/2023 to 4/28/23.    Outpatient Physical Therapy 2 times weekly for 8 weeks to include the following interventions: Electrical Stimulation Attended, Gait Training, Manual Therapy, Moist Heat/ Ice, Neuromuscular Re-ed, Orthotic Management and Training, Patient Education, Self Care, Therapeutic Activities, and Therapeutic Exercise.     Eugenie Stone, PT      I CERTIFY THE NEED FOR THESE SERVICES FURNISHED UNDER THIS PLAN OF TREATMENT AND WHILE UNDER MY CARE   Physician's comments:     Physician's Signature: ___________________________________________________

## 2023-02-28 ENCOUNTER — EXTERNAL CHRONIC CARE MANAGEMENT (OUTPATIENT)
Dept: PRIMARY CARE CLINIC | Facility: CLINIC | Age: 83
End: 2023-02-28
Payer: MEDICARE

## 2023-02-28 PROCEDURE — 99490 PR CHRONIC CARE MGMT, 1ST 20 MIN: ICD-10-PCS | Mod: S$PBB,,, | Performed by: FAMILY MEDICINE

## 2023-02-28 PROCEDURE — 99490 CHRNC CARE MGMT STAFF 1ST 20: CPT | Mod: S$PBB,,, | Performed by: FAMILY MEDICINE

## 2023-02-28 PROCEDURE — 99490 CHRNC CARE MGMT STAFF 1ST 20: CPT | Mod: PBBFAC | Performed by: FAMILY MEDICINE

## 2023-03-02 ENCOUNTER — OFFICE VISIT (OUTPATIENT)
Dept: INTERNAL MEDICINE | Facility: CLINIC | Age: 83
End: 2023-03-02
Payer: MEDICARE

## 2023-03-02 VITALS
TEMPERATURE: 98 F | OXYGEN SATURATION: 96 % | DIASTOLIC BLOOD PRESSURE: 68 MMHG | SYSTOLIC BLOOD PRESSURE: 114 MMHG | RESPIRATION RATE: 18 BRPM | WEIGHT: 134.25 LBS | BODY MASS INDEX: 25.34 KG/M2 | HEIGHT: 61 IN | HEART RATE: 58 BPM

## 2023-03-02 DIAGNOSIS — R23.8 OTHER SKIN CHANGES: ICD-10-CM

## 2023-03-02 DIAGNOSIS — E11.9 TYPE 2 DIABETES MELLITUS WITHOUT COMPLICATION, WITHOUT LONG-TERM CURRENT USE OF INSULIN: Primary | ICD-10-CM

## 2023-03-02 DIAGNOSIS — I10 HYPERTENSION, UNSPECIFIED TYPE: ICD-10-CM

## 2023-03-02 PROCEDURE — 99999 PR PBB SHADOW E&M-EST. PATIENT-LVL IV: CPT | Mod: PBBFAC,,, | Performed by: FAMILY MEDICINE

## 2023-03-02 PROCEDURE — 82570 ASSAY OF URINE CREATININE: CPT | Performed by: FAMILY MEDICINE

## 2023-03-02 PROCEDURE — 99214 OFFICE O/P EST MOD 30 MIN: CPT | Mod: PBBFAC | Performed by: FAMILY MEDICINE

## 2023-03-02 PROCEDURE — 99214 OFFICE O/P EST MOD 30 MIN: CPT | Mod: S$PBB,,, | Performed by: FAMILY MEDICINE

## 2023-03-02 PROCEDURE — 99999 PR PBB SHADOW E&M-EST. PATIENT-LVL IV: ICD-10-PCS | Mod: PBBFAC,,, | Performed by: FAMILY MEDICINE

## 2023-03-02 PROCEDURE — 99214 PR OFFICE/OUTPT VISIT, EST, LEVL IV, 30-39 MIN: ICD-10-PCS | Mod: S$PBB,,, | Performed by: FAMILY MEDICINE

## 2023-03-02 RX ORDER — LOSARTAN POTASSIUM 50 MG/1
50 TABLET ORAL 2 TIMES DAILY
Qty: 180 TABLET | Refills: 1 | Status: SHIPPED | OUTPATIENT
Start: 2023-03-02 | End: 2023-09-07 | Stop reason: SDUPTHER

## 2023-03-02 RX ORDER — TIZANIDINE 4 MG/1
TABLET ORAL
COMMUNITY
Start: 2023-02-15

## 2023-03-02 NOTE — PROGRESS NOTES
Subjective:       Patient ID: Leslie Wood is a 82 y.o. female.    Chief Complaint: Follow-up         Ms Wood presents today for follow up.    2 heart attacks  TIA within past month     CVA after having COVID in 2020   Knee injury after a fall in August of last year     She already had issues with the knee  She has subsequently had more falls   She needs a knee replacement     Follow-up  Pertinent negatives include no abdominal pain, arthralgias, chest pain, congestion, coughing, fatigue, fever, headaches, nausea, numbness, rash, vomiting or weakness.     Review of Systems   Constitutional:  Negative for activity change, appetite change, fatigue and fever.   HENT:  Negative for congestion, ear pain and sinus pressure.    Eyes:  Negative for pain and visual disturbance.   Respiratory:  Negative for cough, chest tightness and wheezing.    Cardiovascular:  Negative for chest pain, palpitations and leg swelling.   Gastrointestinal:  Negative for abdominal distention, abdominal pain, constipation, diarrhea, nausea and vomiting.   Endocrine: Negative for polydipsia and polyuria.   Genitourinary:  Negative for difficulty urinating, dyspareunia, dysuria, flank pain and hematuria.   Musculoskeletal:  Negative for arthralgias and back pain.   Skin:  Negative for rash.   Neurological:  Negative for dizziness, tremors, syncope, weakness, numbness and headaches.   Psychiatric/Behavioral:  Negative for agitation and confusion.          Past Medical History:   Diagnosis Date    Arthritis     Cataract     GERD (gastroesophageal reflux disease)     Heart attack 05/18/2022    Heart attack 05/23/2022    Hyperlipidemia     Hypertension     Stroke      Past Surgical History:   Procedure Laterality Date    ADENOIDECTOMY      ADRENAL GLAND SURGERY      APPENDECTOMY      BACK SURGERY      fusion l 4-5 s 1,2,3  fusion l 2-3    CORONARY ANGIOGRAPHY N/A 5/20/2022    Procedure: ANGIOGRAM, CORONARY ARTERY;  Surgeon: Domingo Martins MD;   Location: [facility] CATH LAB;  Service: Cardiology;  Laterality: N/A;    CORONARY ANGIOGRAPHY N/A 5/24/2022    Procedure: ANGIOGRAM, CORONARY ARTERY;  Surgeon: Domingo Martins MD;  Location: [facility] CATH LAB;  Service: Cardiology;  Laterality: N/A;    EYE SURGERY      HEMORRHOID SURGERY      HERNIA REPAIR      HYSTERECTOMY      indirect lumbar decompression      percutaneous placement of interspinous extension blocker    LEFT HEART CATHETERIZATION Left 5/20/2022    Procedure: CATHETERIZATION, HEART, LEFT;  Surgeon: Domingo Martins MD;  Location: [facility] CATH LAB;  Service: Cardiology;  Laterality: Left;    TONSILLECTOMY       Family History   Problem Relation Age of Onset    Heart disease Mother     Hypertension Mother     Diabetes Mother     Hypertension Father     Kidney disease Father     Breast cancer Sister      Social History     Socioeconomic History    Marital status:    Tobacco Use    Smoking status: Never    Smokeless tobacco: Never   Substance and Sexual Activity    Alcohol use: No    Drug use: No    Sexual activity: Not Currently       Objective:        Physical Exam  Vitals reviewed.   Constitutional:       Appearance: Normal appearance.   HENT:      Nose: Nose normal.   Eyes:      Extraocular Movements: Extraocular movements intact.   Cardiovascular:      Rate and Rhythm: Normal rate and regular rhythm.   Pulmonary:      Effort: Pulmonary effort is normal.   Skin:     Findings: Bruising present.   Neurological:      General: No focal deficit present.      Mental Status: She is alert.   Psychiatric:         Mood and Affect: Mood normal.         Behavior: Behavior normal.         Results for orders placed or performed in visit on 03/02/23   Microalbumin/Creatinine Ratio, Urine   Result Value Ref Range    Microalbumin, Urine 11.0 ug/mL    Creatinine, Urine 68.0 15.0 - 325.0 mg/dL    Microalb/Creat Ratio 16.2 0.0 - 30.0 ug/mg       Assessment/Plan:     Type 2 diabetes mellitus without complication, without  long-term current use of insulin  -     Microalbumin/Creatinine Ratio, Urine; Future; Expected date: 03/02/2023  -     Ambulatory referral/consult to Ophthalmology; Future; Expected date: 03/09/2023    Hypertension, unspecified type  -     losartan (COZAAR) 50 MG tablet; Take 1 tablet (50 mg total) by mouth 2 (two) times a day.  Dispense: 180 tablet; Refill: 1    Other skin changes  -     Ambulatory referral/consult to Dermatology; Future; Expected date: 03/09/2023          Follow up 6 weeks     Angeles Carson MD  ON   Family Medicine

## 2023-03-03 LAB
ALBUMIN/CREAT UR: 16.2 UG/MG (ref 0–30)
CREAT UR-MCNC: 68 MG/DL (ref 15–325)
MICROALBUMIN UR DL<=1MG/L-MCNC: 11 UG/ML

## 2023-03-06 ENCOUNTER — CLINICAL SUPPORT (OUTPATIENT)
Dept: REHABILITATION | Facility: HOSPITAL | Age: 83
End: 2023-03-06
Payer: MEDICARE

## 2023-03-06 DIAGNOSIS — R41.841 COGNITIVE COMMUNICATION DISORDER: ICD-10-CM

## 2023-03-06 DIAGNOSIS — R47.01 APHASIA: Primary | ICD-10-CM

## 2023-03-06 PROCEDURE — 92507 TX SP LANG VOICE COMM INDIV: CPT

## 2023-03-06 NOTE — PROGRESS NOTES
[facility]  Speech Therapy Treatment Note- Neurological Rehabilitation    Date: 3/6/2023     Name: Leslie Wood   MRN: [id number]   Therapy Diagnosis:   Encounter Diagnoses   Name Primary?    Aphasia Yes    Cognitive communication disorder      Physician: Sammy Juarez MD  Physician Orders: [id number] - AMB REFERRAL/CONSULT TO SPEECH THERAPY  Medical Diagnosis: R47.01 (ICD-10-CM) - Aphasia    Visit #/ Visits Authorized: 2/10  Date of Evaluation:  2/17/2023   Insurance Authorization Period: 2/23/2023 - 12/31/2023   Plan of Care Expiration Date:    4/12/2023   Extended Plan of Care:  NA   Progress Note: 3/20/2023    Visits Cancelled: 0  Visits No Show: 0    Time In:  8:25 AM   Time Out:  9:20 AM   Total Billable Time: 50 minutes      Precautions: Standard  Subjective:   Patient reports: Patient arrived her to appointment with her supportive granddaughter. They reported they are trying to get in with the Orthopedic. They're appointment was pushed back to later this month.   She was compliant to home exercise program.   Response to previous treatment: Recalled some education provided at last session.   Pain Scale:  8/10 on a Visual Analog Scale currently.   Pain Location: bilateral lower back   Objective:   TIMED  Procedure Min.   Cognitive Therapeutic Interventions, first 15 minutes CPT 25142  0   Cognitive Therapeutic Interventions, each additional 15 minutes CPT 82749  0         UNTIMED  Procedure Min.   Speech- Language- Voice Therapy  50     Total Timed Units: 0  Total Untimed Units: 1  Charges Billed/Number of units: 87890/1    Short Term Goals: (6 weeks) Current Progress:   Patient will describe functional objects/images to facilitate a strategy of description in order to increase transfer of intended message with 80% accuracy and moderate clinician cueing.     Progressing/ Not Met 3/6/2023   Not formally addressed.     Patient will complete word finding task (i.e. Creating subject, verb, object pairs in  VNEST protocol) with 90% accuracy moderate assistance to improve word fluency.     Progressing/ Not Met 3/6/2023   VNEST completed X1 today (play). Patient required moderate cueing to generate subjects/objects and additional cueing for spelling. She often added in extra words such as (Vicki PLAYS loves to play piano). When cued to read the sentence she was able to correct the errors. She was given two additional verbs to complete as part of her HEP.    Patient will utilize word finding strategies in structured tasks with 70% accuracy and minimal cues.      Progressing/ Not Met 3/6/2023   Not formally addressed.    Patient will summarize read material to improve word fluency, word finding, and reading comprehension in Attentive Reading and Constrained Summarization (ARCS) protocol with 80% accuracy and minimal verbal assistance across 2-3 sessions.     Progressing/ Not Met 3/6/2023   Not formally addressed.      Patient will write 7-10 word sentences with no more than 2 syntactic/grammatical errors given minimal cueing.      Progressing/ Not Met 3/6/2023   Not formally addressed.      Patient will complete functional writing task with complete, coherent, and accurately spelled responses with 80% accuracy and minimal verbal and visual assistance across 3-5 sessions.     Progressing/ Not Met 3/6/2023   Not formally addressed.      Patient will complete RBANS assessment to further assess cognitive communication skills.        GOAL MET 2/27/2023  Completed today. See below for results.       GOAL MET 2/27/2023    Patient will sustain attention to a moderate task (auditory or visual) for 2 minutes with 90% accuracy or no more than 1 redirection.     Visual: Met x1  Auditory: Met x1 VISUAL:   Constant Therapy N-Back Level 1 (pictures) completed in a quiet environment today with 95% accuracy independently. No redirections required to complete the task.     AUDITORY:  Constant Therapy N-Back Level 1 (spoken words) completed  in a quiet environment today with 93% accuracy independently. No redirections required to complete the task.    Patient will complete selective attention tasks (auditory or visual) with 90% accuracy independently increase selective attention. Not formally addressed.    Patient will use attention shifting strategies to shift attention between two tasks (auditory or visual) with no more than 3 cues or 90% accuracy to improve alternating attention. Not formally addressed.    Patient will complete short term recall tasks after a 5 minutes delay with 90% accuracy  independently  with use of memory strategies to improve recall of information and generalization of memory strategies. Not formally addressed.    Patient will use Goal Plan Action Review strategy to complete moderate to complex reasoning, planning, or organization tasks with 90% accuracy independently to improve functional executive function skills. Not formally addressed.    Patient will complete a functional task to improve attention, memory and/or executive functions (I.e. sample bill paying activity, recipe, or multiple choice comprehension questions to 1 paragraphs) with 80% accuracy and natural environment noise distractions (TV news background, music, etc.). Not formally addressed.    Patient will be educated regarding cognitive deficits as applicable to the patient's life for increased awareness and will execute returned demonstration of information with 80% accuracy.           GOAL MET 3/6/2023  Education provided regarding results from her assessment, the hierarchy of neuropsychological functions, and the role of attention in other executive functions. Patient and granddaughter verbalized understanding.     GOAL MET 3/6/2023    Patient will independently generate strategies to improve ability to complete tasks with enhanced accuracy and time, based on review of objective previous performance on trials of functional tasks with 80% accuracy.  Not  formally addressed.    Given implementation of strategies, patient will complete a task following initial attempt with 90% accuracy and reported reduced number on the relative scale of cognitive load when compared to previous trial.  Not formally addressed.    Patient will use external memory strategies in order to recall important dates, events, appointments, or tasks to be completed with 90% accuracy independently.  Not formally addressed.        Patient Education/Response:   Patient and her granddaughter were educated on the hierarchy of neuropsychological functions and how deficits in attention are likely to impact memory. They verbalized understanding of all discussed.     Home program established: yes-Patient provided word finding strategy handout  Exercises were reviewed and Leslie was able to demonstrate them prior to the end of the session.  Leslie demonstrated good  understanding of the education provided.     See Electronic Medical Record under Patient Instructions for exercises provided throughout therapy.  Assessment:   Leslie is progressing well towards her goals. She is motivated to improve and participates in all therapy tasks. Current goals remain appropriate. Goals will be updated as needed.     Patient prognosis is Good. Patient will continue to benefit from skilled outpatient speech and language therapy to address the deficits listed in the problem list on initial evaluation, provide patient/family education and to maximize patient's level of independence in the home and community environment.   Medical necessity is demonstrated by the following IMPAIRMENTS:  Language deficits impact overall quality of life, ability to participation in social and community interactions, and the ability to communicate important medical information if needed.   Cognitive-communication deficits impact overall quality of life and the ability to safely and independently complete activities of daily living.   Barriers to  Therapy: none   Patient's spiritual, cultural and educational needs considered and patient agreeable to plan of care and goals.  Plan:   Continue Plan of Care with focus on attention, executive functions, language.     Becca Blanco, CCC-SLP, CBIS   Speech Language Pathologist   Certified Brain Injury Specialist   3/6/2023

## 2023-03-08 ENCOUNTER — TELEPHONE (OUTPATIENT)
Dept: INTERNAL MEDICINE | Facility: CLINIC | Age: 83
End: 2023-03-08
Payer: MEDICARE

## 2023-03-08 ENCOUNTER — CLINICAL SUPPORT (OUTPATIENT)
Dept: REHABILITATION | Facility: HOSPITAL | Age: 83
End: 2023-03-08
Payer: MEDICARE

## 2023-03-08 ENCOUNTER — HOSPITAL ENCOUNTER (EMERGENCY)
Facility: HOSPITAL | Age: 83
Discharge: HOME | End: 2023-03-08
Attending: EMERGENCY MEDICINE
Payer: MEDICARE

## 2023-03-08 VITALS
TEMPERATURE: 98 F | OXYGEN SATURATION: 99 % | RESPIRATION RATE: 20 BRPM | HEIGHT: 61 IN | SYSTOLIC BLOOD PRESSURE: 144 MMHG | HEART RATE: 64 BPM | BODY MASS INDEX: 25.37 KG/M2 | DIASTOLIC BLOOD PRESSURE: 63 MMHG

## 2023-03-08 DIAGNOSIS — R53.1 WEAKNESS: ICD-10-CM

## 2023-03-08 DIAGNOSIS — F41.0 ANXIETY ATTACK: Primary | ICD-10-CM

## 2023-03-08 DIAGNOSIS — R47.01 APHASIA: Primary | ICD-10-CM

## 2023-03-08 DIAGNOSIS — R41.841 COGNITIVE COMMUNICATION DISORDER: ICD-10-CM

## 2023-03-08 LAB
ALBUMIN SERPL BCP-MCNC: 4 G/DL (ref 3.5–5.2)
ALP SERPL-CCNC: 93 U/L (ref 55–135)
ALT SERPL W/O P-5'-P-CCNC: 32 U/L (ref 10–44)
ANION GAP SERPL CALC-SCNC: 15 MMOL/L (ref 8–16)
AST SERPL-CCNC: 30 U/L (ref 10–40)
BASOPHILS # BLD AUTO: 0.05 K/UL (ref 0–0.2)
BASOPHILS NFR BLD: 0.5 % (ref 0–1.9)
BILIRUB SERPL-MCNC: 1 MG/DL (ref 0.1–1)
BUN SERPL-MCNC: 28 MG/DL (ref 8–23)
CALCIUM SERPL-MCNC: 9.9 MG/DL (ref 8.7–10.5)
CHLORIDE SERPL-SCNC: 104 MMOL/L (ref 95–110)
CO2 SERPL-SCNC: 24 MMOL/L (ref 23–29)
CREAT SERPL-MCNC: 1.1 MG/DL (ref 0.5–1.4)
DIFFERENTIAL METHOD: ABNORMAL
EOSINOPHIL # BLD AUTO: 0.1 K/UL (ref 0–0.5)
EOSINOPHIL NFR BLD: 1.2 % (ref 0–8)
ERYTHROCYTE [DISTWIDTH] IN BLOOD BY AUTOMATED COUNT: 15.4 % (ref 11.5–14.5)
EST. GFR  (NO RACE VARIABLE): 50 ML/MIN/1.73 M^2
GLUCOSE SERPL-MCNC: 100 MG/DL (ref 70–110)
HCT VFR BLD AUTO: 45.3 % (ref 37–48.5)
HCV AB SERPL QL IA: NEGATIVE
HEP C VIRUS HOLD SPECIMEN: NORMAL
HGB BLD-MCNC: 14.4 G/DL (ref 12–16)
HIV 1+2 AB+HIV1 P24 AG SERPL QL IA: NEGATIVE
IMM GRANULOCYTES # BLD AUTO: 0.04 K/UL (ref 0–0.04)
IMM GRANULOCYTES NFR BLD AUTO: 0.4 % (ref 0–0.5)
LYMPHOCYTES # BLD AUTO: 3.3 K/UL (ref 1–4.8)
LYMPHOCYTES NFR BLD: 31.4 % (ref 18–48)
MCH RBC QN AUTO: 28.5 PG (ref 27–31)
MCHC RBC AUTO-ENTMCNC: 31.8 G/DL (ref 32–36)
MCV RBC AUTO: 90 FL (ref 82–98)
MONOCYTES # BLD AUTO: 1 K/UL (ref 0.3–1)
MONOCYTES NFR BLD: 9.7 % (ref 4–15)
NEUTROPHILS # BLD AUTO: 6 K/UL (ref 1.8–7.7)
NEUTROPHILS NFR BLD: 56.8 % (ref 38–73)
NRBC BLD-RTO: 0 /100 WBC
PLATELET # BLD AUTO: 275 K/UL (ref 150–450)
PMV BLD AUTO: 9.6 FL (ref 9.2–12.9)
POTASSIUM SERPL-SCNC: 4 MMOL/L (ref 3.5–5.1)
PROT SERPL-MCNC: 7.7 G/DL (ref 6–8.4)
RBC # BLD AUTO: 5.05 M/UL (ref 4–5.4)
SODIUM SERPL-SCNC: 143 MMOL/L (ref 136–145)
WBC # BLD AUTO: 10.48 K/UL (ref 3.9–12.7)

## 2023-03-08 PROCEDURE — 86803 HEPATITIS C AB TEST: CPT | Performed by: EMERGENCY MEDICINE

## 2023-03-08 PROCEDURE — 85025 COMPLETE CBC W/AUTO DIFF WBC: CPT | Performed by: EMERGENCY MEDICINE

## 2023-03-08 PROCEDURE — 93010 ELECTROCARDIOGRAM REPORT: CPT | Mod: ,,, | Performed by: INTERNAL MEDICINE

## 2023-03-08 PROCEDURE — 93010 EKG 12-LEAD: ICD-10-PCS | Mod: ,,, | Performed by: INTERNAL MEDICINE

## 2023-03-08 PROCEDURE — 99284 EMERGENCY DEPT VISIT MOD MDM: CPT

## 2023-03-08 PROCEDURE — 25000003 PHARM REV CODE 250: Performed by: EMERGENCY MEDICINE

## 2023-03-08 PROCEDURE — 87389 HIV-1 AG W/HIV-1&-2 AB AG IA: CPT | Performed by: EMERGENCY MEDICINE

## 2023-03-08 PROCEDURE — 80053 COMPREHEN METABOLIC PANEL: CPT | Performed by: EMERGENCY MEDICINE

## 2023-03-08 PROCEDURE — 93005 ELECTROCARDIOGRAM TRACING: CPT

## 2023-03-08 RX ORDER — DULOXETIN HYDROCHLORIDE 30 MG/1
30 CAPSULE, DELAYED RELEASE ORAL ONCE
Status: COMPLETED | OUTPATIENT
Start: 2023-03-08 | End: 2023-03-08

## 2023-03-08 RX ORDER — ALPRAZOLAM 0.25 MG/1
0.25 TABLET ORAL
Status: COMPLETED | OUTPATIENT
Start: 2023-03-08 | End: 2023-03-08

## 2023-03-08 RX ADMIN — ALPRAZOLAM 0.25 MG: 0.25 TABLET ORAL at 10:03

## 2023-03-08 RX ADMIN — DULOXETINE 30 MG: 30 CAPSULE, DELAYED RELEASE ORAL at 10:03

## 2023-03-08 NOTE — TELEPHONE ENCOUNTER
----- Message from Ekaterina Barnes sent at 3/8/2023  8:56 AM CST -----  Contact: daughter(Mirta)[phone]  Daughter is calling to consult with nurse regarding medication refill, please call her back at )[phone]. Thanks/ar

## 2023-03-08 NOTE — ED PROVIDER NOTES
SCRIBE #1 NOTE: I, Lisa Hendricks, am scribing for, and in the presence of, Cindy Jacinto MD. I have scribed the entire note.      History      Chief Complaint   Patient presents with    Numbness     Pt sent here from the [facility]. Pt c/o numbness to bilateral lower extremities and L hand starting at 9 this AM. Hx of a previous stroke       Review of patient's allergies indicates:   Allergen Reactions    Acetaminophen     Amitriptyline     Hydrochlorothiazide      Causes muscle cramping    Lisinopril      hyperkalemia    Oxycodone     Percocet [oxycodone-acetaminophen] Other (See Comments)     Seizures    Belbuca [buprenorphine hcl] Nausea And Vomiting and Other (See Comments)     Black out     Codeine Nausea Only and Rash    Prazosin Other (See Comments)     dizziness        HPI   HPI    3/8/2023, 10:36 AM   History obtained from the patient and the pt's granddaughter at bedside      History of Present Illness: Leslie Wood is a 82 y.o. female patient with a PMHx of HLD, HTN, and stroke and past surgical history of back surgery who presents to the Emergency Department for anxiety which onset this morning. Per the pt's daughter, the pt started to experience worsening back pain this morning which caused her to have episodes where she was not breathing properly and experiencing numbness to her BLE and L hand. The pt's granddaughter believes that these episodes are due to anxiety because she has been out of her Cymbalta for 3 days due to issues getting it refilled. Symptoms are episodic and moderate in severity. No mitigating or exacerbating factors reported. Associated sxs include numbness to the BLE and L hand and back pain (chronic, and not worse than normal at this time). Patient denies any weakness, speech difficulty, dizziness, headaches, N/V/D, CP, SOB, and all other sxs at this time. No prior Tx reported. No further complaints or concerns at this time.         Arrival mode: EMS    PCP: Angeles Carson MD        Past Medical History:  Past Medical History:   Diagnosis Date    Arthritis     Cataract     GERD (gastroesophageal reflux disease)     Heart attack 05/18/2022    Heart attack 05/23/2022    Hyperlipidemia     Hypertension     Stroke        Past Surgical History:  Past Surgical History:   Procedure Laterality Date    ADENOIDECTOMY      ADRENAL GLAND SURGERY      APPENDECTOMY      BACK SURGERY      fusion l 4-5 s 1,2,3  fusion l 2-3    CORONARY ANGIOGRAPHY N/A 5/20/2022    Procedure: ANGIOGRAM, CORONARY ARTERY;  Surgeon: Domingo Martins MD;  Location: [facility] CATH LAB;  Service: Cardiology;  Laterality: N/A;    CORONARY ANGIOGRAPHY N/A 5/24/2022    Procedure: ANGIOGRAM, CORONARY ARTERY;  Surgeon: Domingo Martins MD;  Location: [facility] CATH LAB;  Service: Cardiology;  Laterality: N/A;    EYE SURGERY      HEMORRHOID SURGERY      HERNIA REPAIR      HYSTERECTOMY      indirect lumbar decompression      percutaneous placement of interspinous extension blocker    LEFT HEART CATHETERIZATION Left 5/20/2022    Procedure: CATHETERIZATION, HEART, LEFT;  Surgeon: Domingo Martins MD;  Location: [facility] CATH LAB;  Service: Cardiology;  Laterality: Left;    TONSILLECTOMY           Family History:  Family History   Problem Relation Age of Onset    Heart disease Mother     Hypertension Mother     Diabetes Mother     Hypertension Father     Kidney disease Father     Breast cancer Sister        Social History:  Social History     Tobacco Use    Smoking status: Never    Smokeless tobacco: Never   Substance and Sexual Activity    Alcohol use: No    Drug use: No    Sexual activity: Not Currently       ROS   Review of Systems   Constitutional:  Negative for fever.   HENT:  Negative for sore throat.    Respiratory:  Negative for shortness of breath.    Cardiovascular:  Negative for chest pain.   Gastrointestinal:  Negative for diarrhea, nausea and vomiting.   Genitourinary:  Negative for dysuria.   Musculoskeletal:  Positive for back pain  (chronic).   Skin:  Negative for rash.   Neurological:  Positive for numbness (to BLE and L hand). Negative for dizziness, speech difficulty, weakness and headaches.   Hematological:  Does not bruise/bleed easily.   Psychiatric/Behavioral:  The patient is nervous/anxious.    All other systems reviewed and are negative.    Physical Exam      Initial Vitals [03/08/23 0939]   BP Pulse Resp Temp SpO2   127/78 78 16 97.8 °F (36.6 °C) 98 %      MAP       --          Physical Exam  Nursing Notes and Vital Signs Reviewed.  Constitutional: Patient is in no acute distress. Well-developed and well-nourished.  Head: Atraumatic. Normocephalic.  Eyes: PERRL. EOM intact. Conjunctivae are not pale. No scleral icterus.  ENT: Mucous membranes are moist. Oropharynx is clear and symmetric.    Neck: Supple. Full ROM. No lymphadenopathy.  Cardiovascular: Regular rate. Regular rhythm. No murmurs, rubs, or gallops. Distal pulses are 2+ and symmetric.  Pulmonary/Chest: No respiratory distress. Clear to auscultation bilaterally. No wheezing or rales.  Abdominal: Soft and non-distended.  There is no tenderness.  No rebound, guarding, or rigidity.   Musculoskeletal: Moves all extremities. No obvious deformities. No edema.   Skin: Warm and dry.  Neurological: Patient is alert and oriented to person, place and time. Pupils ERRL and EOM normal. Cranial nerves II-XII are intact. Strength is full bilaterally; it is equal and 5/5 in bilateral upper and lower extremities. There is no pronator drift of outstretched arms. Light touch sense is intact. Speech is clear and normal. No acute focal neurological deficits noted.  Psychiatric: Anxious. Good eye contact. Appropriate in content.    ED Course    Procedures  ED Vital Signs:  Vitals:    03/08/23 0939 03/08/23 1045 03/08/23 1100 03/08/23 1115   BP: 127/78 (!) 154/83 (!) 150/82 (!) 141/63   Pulse: 78 67 68 61   Resp: 16 20 (!) 22 16   Temp: 97.8 °F (36.6 °C)      TempSrc: Oral      SpO2: 98% 99% 97%  "96%   Height: 5' 1" (1.549 m)       03/08/23 1130 03/08/23 1153   BP: 139/63 (!) 144/63   Pulse: 66 64   Resp: 20 20   Temp:     TempSrc:     SpO2: 96% 99%   Height:         Abnormal Lab Results:  Labs Reviewed   CBC W/ AUTO DIFFERENTIAL - Abnormal; Notable for the following components:       Result Value    MCHC 31.8 (*)     RDW 15.4 (*)     All other components within normal limits   COMPREHENSIVE METABOLIC PANEL - Abnormal; Notable for the following components:    BUN 28 (*)     eGFR 50 (*)     All other components within normal limits   HIV 1 / 2 ANTIBODY    Narrative:     Release to patient->Immediate   HEPATITIS C ANTIBODY    Narrative:     Release to patient->Immediate   HEP C VIRUS HOLD SPECIMEN    Narrative:     Release to patient->Immediate        All Lab Results:  Results for orders placed or performed during the hospital encounter of 03/08/23   HIV 1/2 Ag/Ab (4th Gen)   Result Value Ref Range    HIV 1/2 Ag/Ab Negative Negative   Hepatitis C Antibody   Result Value Ref Range    Hepatitis C Ab Negative Negative   HCV Virus Hold Specimen   Result Value Ref Range    HEP C Virus Hold Specimen Hold for HCV sendout    CBC auto differential   Result Value Ref Range    WBC 10.48 3.90 - 12.70 K/uL    RBC 5.05 4.00 - 5.40 M/uL    Hemoglobin 14.4 12.0 - 16.0 g/dL    Hematocrit 45.3 37.0 - 48.5 %    MCV 90 82 - 98 fL    MCH 28.5 27.0 - 31.0 pg    MCHC 31.8 (L) 32.0 - 36.0 g/dL    RDW 15.4 (H) 11.5 - 14.5 %    Platelets 275 150 - 450 K/uL    MPV 9.6 9.2 - 12.9 fL    Immature Granulocytes 0.4 0.0 - 0.5 %    Gran # (ANC) 6.0 1.8 - 7.7 K/uL    Immature Grans (Abs) 0.04 0.00 - 0.04 K/uL    Lymph # 3.3 1.0 - 4.8 K/uL    Mono # 1.0 0.3 - 1.0 K/uL    Eos # 0.1 0.0 - 0.5 K/uL    Baso # 0.05 0.00 - 0.20 K/uL    nRBC 0 0 /100 WBC    Gran % 56.8 38.0 - 73.0 %    Lymph % 31.4 18.0 - 48.0 %    Mono % 9.7 4.0 - 15.0 %    Eosinophil % 1.2 0.0 - 8.0 %    Basophil % 0.5 0.0 - 1.9 %    Differential Method Automated    Comprehensive " metabolic panel   Result Value Ref Range    Sodium 143 136 - 145 mmol/L    Potassium 4.0 3.5 - 5.1 mmol/L    Chloride 104 95 - 110 mmol/L    CO2 24 23 - 29 mmol/L    Glucose 100 70 - 110 mg/dL    BUN 28 (H) 8 - 23 mg/dL    Creatinine 1.1 0.5 - 1.4 mg/dL    Calcium 9.9 8.7 - 10.5 mg/dL    Total Protein 7.7 6.0 - 8.4 g/dL    Albumin 4.0 3.5 - 5.2 g/dL    Total Bilirubin 1.0 0.1 - 1.0 mg/dL    Alkaline Phosphatase 93 55 - 135 U/L    AST 30 10 - 40 U/L    ALT 32 10 - 44 U/L    Anion Gap 15 8 - 16 mmol/L    eGFR 50 (A) >60 mL/min/1.73 m^2         Imaging Results:  Imaging Results    None        The EKG was ordered, reviewed, and independently interpreted by the ED provider.  Interpretation time: 10:49  Rate: 65 BPM  Rhythm: normal sinus rhythm  Interpretation: No acute ST changes. No STEMI.           The Emergency Provider reviewed the vital signs and test results, which are outlined above.    ED Discussion     11:53 AM: Reassessed pt at this time. Discussed with pt all pertinent ED information and results. Discussed pt dx and plan of tx. Gave pt all f/u and return to the ED instructions. All questions and concerns were addressed at this time. Pt expresses understanding of information and instructions, and is comfortable with plan to discharge. Pt is stable for discharge.    I discussed with patient and/or family/caretaker that evaluation in the ED does not suggest any emergent or life threatening medical conditions requiring immediate intervention beyond what was provided in the ED, and I believe patient is safe for discharge.  Regardless, an unremarkable evaluation in the ED does not preclude the development or presence of a serious of life threatening condition. As such, patient was instructed to return immediately for any worsening or change in current symptoms.           ED Medication(s):  Medications   DULoxetine DR capsule 30 mg (30 mg Oral Given 3/8/23 1045)   ALPRAZolam tablet 0.25 mg (0.25 mg Oral Given 3/8/23  1045)        Follow-up Information       Angeles Carson MD. Schedule an appointment as soon as possible for a visit in 2 days.    Specialty: Internal Medicine  Why: Return to the Emergency Room, If symptoms worsen  Contact information:  [address] [place] [address] [address] [place] [id number]  [phone]                             Discharge Medication List as of 3/8/2023 11:53 AM            Medical Decision Making    Medical Decision Making:   Clinical Tests:   Lab Tests: Ordered and Reviewed  Medical Tests: Ordered and Reviewed  ED Management:  Patient presents with what appears to be an anxiety attack after having an exacerbation of her chronic back pain while at physical therapy today, she has also not had her cymbalta for 3 days.  She is not having a stroke, her neuro exam is normal, CT of head not ordered as I am certain her symptoms are related to anxiety, grand daughter at bedside to confirm, patient feeling better after xanax and cymbalta, she is stable for discharge.          Scribe Attestation:   Scribe #1: I performed the above scribed service and the documentation accurately describes the services I performed. I attest to the accuracy of the note.    Attending:   Physician Attestation Statement for Scribe #1: I, Cindy Jacinto MD, personally performed the services described in this documentation, as scribed by Lisa Hendricks, in my presence, and it is both accurate and complete.          Clinical Impression       ICD-10-CM ICD-9-CM   1. Anxiety attack  F41.0 300.01   2. Weakness  R53.1 780.79       Disposition:   Disposition: Discharged  Condition: Stable       Cindy Jacinto MD  03/08/23 [id number]

## 2023-03-08 NOTE — PROGRESS NOTES
Patient arrived to her session about 15 minutes late due to traffic. Patient accompanied by her granddaughter. In the waiting room, she reported severe back pain, SOB, and a fast heart rate. In the waiting room, BP was 145/85. Patient given a heat pack for her back pain and reported improvement in symptoms. She reported she would like to continue with therapy. She transitioned to therapy from waiting room and reported exacerbation of symptoms. Rapid response called around 8:45-8:50 am. See rapid response notes. No charges have been posted today.     EMS called and patient was transferred to [facility].

## 2023-03-13 ENCOUNTER — OFFICE VISIT (OUTPATIENT)
Dept: NEUROLOGY | Facility: CLINIC | Age: 83
End: 2023-03-13
Payer: MEDICARE

## 2023-03-13 VITALS
BODY MASS INDEX: 25.39 KG/M2 | HEIGHT: 61 IN | HEART RATE: 68 BPM | SYSTOLIC BLOOD PRESSURE: 144 MMHG | WEIGHT: 134.5 LBS | DIASTOLIC BLOOD PRESSURE: 75 MMHG

## 2023-03-13 DIAGNOSIS — I10 ESSENTIAL HYPERTENSION: Chronic | ICD-10-CM

## 2023-03-13 DIAGNOSIS — G45.9 TIA (TRANSIENT ISCHEMIC ATTACK): Primary | ICD-10-CM

## 2023-03-13 DIAGNOSIS — E78.2 MIXED HYPERLIPIDEMIA: ICD-10-CM

## 2023-03-13 DIAGNOSIS — R47.01 APHASIA: ICD-10-CM

## 2023-03-13 PROCEDURE — 99999 PR PBB SHADOW E&M-EST. PATIENT-LVL V: CPT | Mod: PBBFAC,,, | Performed by: PSYCHIATRY & NEUROLOGY

## 2023-03-13 PROCEDURE — 99214 OFFICE O/P EST MOD 30 MIN: CPT | Mod: S$PBB,,, | Performed by: PSYCHIATRY & NEUROLOGY

## 2023-03-13 PROCEDURE — 99214 PR OFFICE/OUTPT VISIT, EST, LEVL IV, 30-39 MIN: ICD-10-PCS | Mod: S$PBB,,, | Performed by: PSYCHIATRY & NEUROLOGY

## 2023-03-13 PROCEDURE — 99215 OFFICE O/P EST HI 40 MIN: CPT | Mod: PBBFAC | Performed by: PSYCHIATRY & NEUROLOGY

## 2023-03-13 PROCEDURE — 99999 PR PBB SHADOW E&M-EST. PATIENT-LVL V: ICD-10-PCS | Mod: PBBFAC,,, | Performed by: PSYCHIATRY & NEUROLOGY

## 2023-03-13 RX ORDER — ATORVASTATIN CALCIUM 80 MG/1
40 TABLET, FILM COATED ORAL EVERY MORNING
Qty: 30 TABLET | Refills: 5 | Status: SHIPPED | OUTPATIENT
Start: 2023-03-13 | End: 2023-11-02

## 2023-03-13 NOTE — PATIENT INSTRUCTIONS
-- Activity as tolerated  -- ASA 81 mg daily PO   -- Atorvastatin decrease from 80 mg to 40 mg daily   -- Starting PT/OT and speech   -- Event monitor placed during hospitalization. Follow up with cardiology for event monitoring   -- If event monitoring negative. I am recommending placing loop recorder.   -- We had an in depth discussion with patient and family regarding the imaging findings, TIA etiology and our plan    -- We discussed her vascular risk factors and the need for her to continue to modify her risks e.g. taking her mediations, proper diet, and exercise.  -- Work with primary care provider on stroke risk factor management, BP <140/90, cholesterol monitoring  -- We discussed the need for her to continue to exercise her body and her mind with physical activity and mental activities.  -- We discussed BEFAST and the need for her to recognize stroke symptoms and report to the ER early is needed.    B-Balance  E-Eyes, vision  F-Facial Droop  A-Arm or leg weakness on one side  S-Speech trouble  T-Time - CALL 911!    -- Please have this patient follow up in stroke clinic in 2-3 months

## 2023-03-13 NOTE — PROGRESS NOTES
"  Patient Name: Leslie Wood  MRN: [id number]    Chief Complaint: Follow-up post hospitalization admission for TIA      Service used: No    Interval History: (3/13/2023)  Patient and family reported resolution of TIA symptoms that she was admitted with recently. She reported one event on Thursday where she felt that her heart is racing for few seconds. No other associated symptoms reported. Per family back to baseline.           Discharge summary.    Ms. Wood is a 82 year old female with a history of GERD, CAD, MI, hypertension, cardiomyopathy and CVA in 2020 who presented to the ED for eval of worsening confusion, aphasia that family noticed around 0530 this a (last known well time 1/31 approx 2100).  Per daughter at bedside, yesterday patient was in her normal state of health, went to her cardiology appointment and last night patient was noted to be a little confused, but family thought that was because she had a long day.  This am, when daughter went into patients room she found her mother sitting up with a "blank stare on her face", speech was slurred and she noticed she had urinated on herself.  Per [facility] who arrived around 0900, pt was able to talk, but she was still confused and had an abnormal finger to nose test on the R. Lab work in the ED unremarkable, UA with no signs of infection, chest xary with no signs of infection.  CT with chronic microvascular changes but no acute intracranial processes. Patient complains of a headache, she denies any N/V/D, fever/chills, SOB.  She will be admitted to observation for further work-up of AMS/TIA.  MRI/MRA unable to be done d/t nerve stimulator that is not compatible with MRI. Neurology consulted.      Past Medical History:   Diagnosis Date    Arthritis     Cataract     GERD (gastroesophageal reflux disease)     Heart attack 05/18/2022    Heart attack 05/23/2022    Hyperlipidemia     Hypertension     Stroke          Past Surgical History:   Procedure " Laterality Date    ADENOIDECTOMY      ADRENAL GLAND SURGERY      APPENDECTOMY      BACK SURGERY      fusion l 4-5 s 1,2,3  fusion l 2-3    CORONARY ANGIOGRAPHY N/A 5/20/2022    Procedure: ANGIOGRAM, CORONARY ARTERY;  Surgeon: Domingo Martins MD;  Location: [facility] CATH LAB;  Service: Cardiology;  Laterality: N/A;    CORONARY ANGIOGRAPHY N/A 5/24/2022    Procedure: ANGIOGRAM, CORONARY ARTERY;  Surgeon: Domingo Martins MD;  Location: [facility] CATH LAB;  Service: Cardiology;  Laterality: N/A;    EYE SURGERY      HEMORRHOID SURGERY      HERNIA REPAIR      HYSTERECTOMY      indirect lumbar decompression      percutaneous placement of interspinous extension blocker    LEFT HEART CATHETERIZATION Left 5/20/2022    Procedure: CATHETERIZATION, HEART, LEFT;  Surgeon: Domingo Martins MD;  Location: [facility] CATH LAB;  Service: Cardiology;  Laterality: Left;    TONSILLECTOMY       Review of patient's allergies indicates:   Allergen Reactions    Acetaminophen     Amitriptyline     Hydrochlorothiazide      Causes muscle cramping    Lisinopril      hyperkalemia    Oxycodone     Percocet [oxycodone-acetaminophen] Other (See Comments)     Seizures    Belbuca [buprenorphine hcl] Nausea And Vomiting and Other (See Comments)     Black out     Codeine Nausea Only and Rash    Prazosin Other (See Comments)     dizziness       Current Outpatient Medications:     aspirin (ECOTRIN) 81 MG EC tablet, Take 1 tablet (81 mg total) by mouth once daily., Disp: 90 tablet, Rfl: 3    atorvastatin (LIPITOR) 80 MG tablet, Take 1 tablet (80 mg total) by mouth every morning., Disp: 30 tablet, Rfl: 5    DULoxetine (CYMBALTA) 30 MG capsule, Take 30 mg by mouth once daily., Disp: , Rfl:     furosemide (LASIX) 20 MG tablet, Take 1 tablet (20 mg total) by mouth once daily., Disp: 30 tablet, Rfl: 11    losartan (COZAAR) 50 MG tablet, Take 1 tablet (50 mg total) by mouth 2 (two) times a day., Disp: 180 tablet, Rfl: 1    metoprolol succinate (TOPROL-XL) 25 MG 24 hr  tablet, Take 1 tablet (25 mg total) by mouth once daily., Disp: 90 tablet, Rfl: 3    NUCYNTA 50 mg Tab, Take 1 tablet (50 mg total) by mouth every 8 (eight) hours as needed (severe pain). RESTART WHEN OK PER PAIN MANAGEMENT PROVIDER, Disp: 1 tablet, Rfl: 0    tiZANidine (ZANAFLEX) 4 MG tablet, , Disp: , Rfl:     clopidogreL (PLAVIX) 75 mg tablet, Take 1 tablet (75 mg total) by mouth once daily. for 21 days, Disp: 21 tablet, Rfl: 0    NIFEdipine (ADALAT CC) 30 MG TbSR, Take 1 tablet (30 mg total) by mouth once daily., Disp: 30 tablet, Rfl: 0    Social History     Socioeconomic History    Marital status:    Tobacco Use    Smoking status: Never    Smokeless tobacco: Never   Substance and Sexual Activity    Alcohol use: No    Drug use: No    Sexual activity: Not Currently     Family History   Problem Relation Age of Onset    Heart disease Mother     Hypertension Mother     Diabetes Mother     Hypertension Father     Kidney disease Father     Breast cancer Sister      Review of Systems  Constitutional: no fevers, no weight changes,  No diaphoresis  HEENT: No congestion, no doublevision, no dysphagia, no rhinnorhea, no lacrimation  Cardiovascular: no chest pain or palpitations  Respiratory: no shortness of breath, no cough  Gastrointestinal: no diarrhea, no constipation  Genitourinary: no dysuria  Musculoskeletal: no joint swelling. No myalgia  Skin: no rash  Psychiatric: no hallucinations, no depression or anxiety  Neurologic: as per HPI  All other review of systems is negative and as per HPI.    Vitals:    03/13/23 0911   BP: (!) 144/75   Pulse: 68      Exam   General: Pleasant, conversant, Well-kempt  HEENT: Head atraumatic. No nasal abnormality. No gaze preference. EOMI.  Cardiovascular: S1S2 present. RRR. No murmurs. No carotid bruit  Lungs: Clear to auscultation b/l  Mental Status: Awake alert and oriented to place, person but not to year. Follows commands. No aphasia or dysarthria. Naming and repetition  intact. Mood is appropriate.  Cranial Nerve: PERRL. Left field cut minor. EOMI. Facial sensation intact. No facial asymmetry. Hearing intact. Palate elevates. Uvula midline. SCM strong. No tongue deviation.  Motor: Normal tone. No cogwheel rigidity. No bradykinesia. No pronator drift. 5/5 strength bilaterally in the upper extremities including deltoids, biceps, triceps, wrist extensors/flexors, finger flexors/extensors, interossei, and APB. 5/5 strength bilaterally in the lower extremities including iliopsoas, hamstring, quadriceps, tibialis anterior, gastrocnemius, EHL.  Deep Tendon Reflexes: 2+ in biceps, tricepts, brachioradialis b/l. 2+ in patellar and ankle jerks.   Sensory: Intact to soft touch, cold, pinprick, vibration. No neglect.  Cerebellar: Finger to nose intact. Heel to shin intact.   Gait: Normal narrow based gait.      Other tests:    HbA1c:5.8  LDL: 38    Carotid US: No clinically significant area of stenosis.     ECHO:  The left ventricle is normal in size with concentric remodeling and normal systolic function.  The estimated ejection fraction is 60%.  Normal left ventricular diastolic function.  Normal right ventricular size with normal right ventricular systolic function.  Mild aortic regurgitation.  There is moderate pulmonary hypertension.  Normal central venous pressure (3 mmHg).  The estimated PA systolic pressure is 52 mmHg.      CT Head.   Chronic microvascular ischemic changes.    CTA Head and Neck:   Chronic right posterior cerebral artery occlusion with right posterior cerebral artery territory infarct  Dense atherosclerotic plaque of the right and of the left cavernous ICA segments.  Bilateral common carotid artery bifurcation calcified plaque with low-grade 20% or less right internal carotid artery origin stenosis.     Assessment:    Ms. Wood is a 82 year old female with a history of GERD, CAD, MI, hypertension, cardiomyopathy and CVA in 2020 who presented to the ED for eval of  worsening confusion, aphasia in February 2023. Symptoms resolved. CT with chronic microvascular changes but no acute intracranial processes. MRI/MRA unable to be done d/t nerve stimulator that is not compatible with MRI. Event monitor was placed by cardiology to rule out atrial fibrillation. Now back to baseline.High suspicion of atrial fibrillation.        Problem List:  - TIA  - History of stroke  2020  - CAD  - MI  - HTN    Plan:  -- Activity as tolerated  -- ASA 81 mg daily PO   -- Completed 21 days of clopidogrel   -- Atorvastatin decrease from 80 mg to 40 mg daily   -- Starting PT/OT and speech   -- Event monitor placed during hospitalization. Follow up with cardiology for event monitoring.   -- If event monitoring negative. I am recommending placing loop recorder.   -- We had an in depth discussion with patient and family regarding the imaging findings, TIA etiology and our plan     -- We discussed her vascular risk factors and the need for her to continue to modify her risks e.g. taking her mediations, proper diet, and exercise.  -- Work with primary care provider on stroke risk factor management, BP <140/90, cholesterol monitoring  -- We discussed the need for her to continue to exercise her body and her mind with physical activity and mental activities.  -- We discussed BEFAST and the need for her to recognize stroke symptoms and report to the ER early is needed.    B-Balance  E-Eyes, vision  F-Facial Droop  A-Arm or leg weakness on one side  S-Speech trouble  T-Time - CALL 911!    -- Please have this patient follow up in my clinic in April    Andrey Rao MD  Neurology   [facility]

## 2023-03-14 ENCOUNTER — CLINICAL SUPPORT (OUTPATIENT)
Dept: REHABILITATION | Facility: HOSPITAL | Age: 83
End: 2023-03-14
Payer: MEDICARE

## 2023-03-14 DIAGNOSIS — Z74.09 IMPAIRED FUNCTIONAL MOBILITY, BALANCE, GAIT, AND ENDURANCE: Primary | ICD-10-CM

## 2023-03-14 PROCEDURE — 97112 NEUROMUSCULAR REEDUCATION: CPT

## 2023-03-14 PROCEDURE — 97530 THERAPEUTIC ACTIVITIES: CPT

## 2023-03-14 NOTE — PATIENT INSTRUCTIONS
HOME EXERCISE PROGRAM  Created by Eugenie Stone PT, DPT  Mar 9th, 2023 View videos at [email or web address]     Total 5      BALANCE - SINGLE LEG    Start by standing with your back to a corner and a chair in front of you for a handhold. Lift up one foot and balance on the other foot. Stand upright, keep your hips level to the ground, and keep your gaze ahead. Hold this position for 10-20 seconds then relax. Switch feet and repeat. Also remember to not let your hip drop during this balancing exercise. Repeat 2 Times   Hold 10 Seconds   Complete 1 Set   Perform 1 Times a Day            BALANCE - TANDEM    Start by standing with your back to a corner and a chair in front of you for a handhold. Line up your feet in a heel-toe position. Stand upright, keeping your gaze ahead. Hold this position for 30 seconds then relax. Switch feet and repeat. Repeat 1 Time   Hold 30 Seconds   Complete 1 Set   Perform 1 Times a Day            BALANCE - ROMBERG    Start by standing with your back to a corner and a chair in front of you for a handhold. Bring your feet together and stand upright, keeping your gaze ahead. Hold this position for 1 minute then relax.    Progress with:  - Head turns/nods 5x's each  - Arm lifts/reaches 5x's each  - Stand on foam cushion or folded towel Repeat 1 Time   Hold 1 Minute   Complete 1 Set   Perform 1 Times a Day            LATERAL STEPS WITH SUPPORT - TABLE - COUNTERTOP     front of a sturdy table or countertop. Take side steps and use the table or countertop for support. Walk about 8 - 10 steps in each direction.  - Repeat 3x's in each direction  - Can progress with head turns and arm reach     Complete 3 Sets   Perform 1 Times a Day            Standing Backward Pivot Step  - Stand facing countertop with both hands on counter for support.  -Then slowly take a step backward with Right foot while rotating your body to the right  - Then step back facing the counter.    - After 8 repetitions,  repeat with L side. Repeat 8 Times   Complete 1 Set   Perform 1 Times a Day

## 2023-03-14 NOTE — PROGRESS NOTES
[facility]   Physical Therapy Treatment Note     Name: Leslie CASTELLON [facility] Number: [id number]    Therapy Diagnosis:   Encounter Diagnosis   Name Primary?    Impaired functional mobility, balance, gait, and endurance Yes     Physician: Sepideh Peters,*    Visit Date: 3/14/2023    Physician Orders: PT Eval and Treat   Medical Diagnosis from Referral: R29.6 (ICD-10-CM) - Falls frequently  Evaluation Date: 2/27/2023  Authorization Period Expiration: 2/17/24  Plan of Care Expiration: 4/28/23  Progress Note Due: 3/27/23  Visit # / Visits authorized: 1/12 (+eval)  FOTO: 0/3 time constraints     Precautions: Standard and Fall     PTA Visit #: 0/5     Time In: 0855  Time Out: 0848  Total Billable Time: 53 minutes    SUBJECTIVE     Pt reports: That she is ok.  Her granddaughter attended session and requested that patient be seen by ortho PT 1day/week for dry needling to her back since this is her number 1 complaint at this time.  He presented with sandals with heels today.   She was not given Home Exercise Program as PT continuing to assess.  Program will be given today.   Response to previous treatment: She responded well to last session.   Functional change: She reports no functional change since last session.     Pain: 6/10  Location: low back     OBJECTIVE     Objective Measures updated at progress report unless specified.     Treatment     Leslie received the treatments listed below:      THERAPEUTIC EXERCISES to develop strength, endurance, ROM, flexibility, posture, and core stabilization for (0) minutes including:    Intervention Performed Today    Supine Stretches  - 3x30 seconds bilateral for each:  - lower trunk rotation   - single knee to chest  - hamstring/ heelcord   Seated Stretches  - 3x30 seconds bilateral for each:  - hamstring/ heelcord  - hip adductor   Standing Stretches  - 3x15 seconds bilateral for each:  - IT band   - heelcord    Seated lower extremity exercises  -  hip flexion 2x10 bilateral   - hamstring curl 2x10 bilateral   - dorsiflexion/ plantarflexion 2x20 bilateral   - hip adduction against ball 1x10 with 3 second hold  - hip abduction against belt 1x10 with 3 second hold   Standing lower extremity exercises   - hip flexion 2x10 bilateral   - hip abduction 2x10 bilateral   - hip extension 2x10 bilateral   - heel raise 2x10                          neuromuscular re-education activities to improve: Balance, Coordination, Kinesthetic, Sense, Proprioception, and Posture for (45) minutes. The following activities were included:    Intervention Performed Today    Air Ex Beam   X  X  X   - Tandem holds 5 seconds, 5x's bilateral   - Tandem walking forward 2x's  - Side stepping 2x's each direction  - tandem walking over hurdles forward and side stepping 2x's each  - single leg standing while tapping opposite foot forward and back on beam   Orange Rubber Beam x  X  X   - Tandem holds 3 seconds, 3x's bilateral   - Tandem walking forward 4x's   - Side stepping 2x's each direction  - single leg standing while tapping opposite foot forward and back on beam   Air Ex Pad         X  X   - Weight shifts Left to Right 2x10  - Weight shifts forward/back 2x10  - single leg standing holds   - Eyes closed 10 seconds  - backward pivot steps with 1 hand support  - standing on pad while toss/catching ball to wall 1x8  - One lower extremity on pad and opposite lower extremity on step tossing ball to wall    Hand paddled per Neuro LEONIE technique and for entirety of session     Core Exercises in supine X  X  X  X Bilateral lower extremities over Tball for each:   - lower trunk rotation 2x10  - bilateral knees to chest 2x10  - posterior pelvic tilt to bridge 2x10   Parallel bars X  X   - walking backward 4x10 ft with 1 hand support  - narrow base standing without hand support performing head turns/nods 1x5 each, arm reach/lifts 1x5 each   NuStep for coordination  x - Level 2, 5 minutes               THERAPEUTIC ACTIVITIES to improve dynamic and functional performance for (8) minutes including:    Intervention Performed Today    Patient and her granddaughter educated on Home Exercise Program for balance.  Also educated on wearing tennis shoes to future sessions.  Discussed the process of changing schedule to get her seen by an ortho PT 1 day/week then by me 1day/week x - Patient and granddaughter verbalized good understanding of education provided.                                              Gait Training: to improve functional mobility and safety for 0  minutes, including:  -     Patient Education and Home Exercises     Home Exercises Provided and Patient Education Provided     Education provided:   - 3/14/23 Patient educated on Home Exercise Program for balance     Written Home Exercises Provided: yes. Exercises were reviewed and Leslie was able to demonstrate them prior to the end of the session.  Leslie verbalized good  understanding of the education provided. See EMR under Patient Instructions for exercises provided during therapy sessions    ASSESSMENT     Patient responded well to session today with good participation.  She was limited by heeled sandals, so all of the stand activities were performed without shoes on today. Despite moderate complaints of back pain, she was able to participate with all higher level tasks.  She has good potential to make gains with continued PT and compliance of Home Exercise Program.     Leslie Is progressing well towards her goals.   Pt prognosis is Good.     Pt will continue to benefit from skilled outpatient physical therapy to address the deficits listed in the problem list box on initial evaluation, provide pt/family education and to maximize pt's level of independence in the home and community environment.     Pt's spiritual, cultural and educational needs considered and pt agreeable to plan of care and goals.     Anticipated barriers to physical therapy:  co-morbidities, sedentary lifestyle, chronicity of condition, and lack of understanding of condition     GOALS:     Short Term Goals:  4 weeks Progress    Function: Patient will demonstrate improved function as indicated by a functional limitation score improved by 3 points from initial measure.  PC   Strength: Patient will demonstrate improved strength by performing 5x sit to  20 seconds in order to progress towards independence with functional activities.  PC   Balance: Complete Encarnacion Balance Scale and set goals accordingly.  PC   Coordination: Patient will demonstrate improved time of 13 sec on TUG to demonstrate improved coordination and safety with mobility. PC   HEP: Patient will demonstrate independence with HEP in order to progress toward functional independence.     Determine the need of Custom Wheelchair Eval to improve patient's positioning and independence with pressure reliefs and independence with home and community mobility to decrease burden of care.              Long Term Goals:  8 weeks Progress   Function: Patient will demonstrate improved function as indicated by a functional limitation score improved 5 points from initial measure.  PC   Strength: Patient will demonstrate improved strength by performing 5x sit to  17 seconds in order to progress towards independence with functional activities.  PC   Patient will return to ADL's, IADL's, community involvement, recreational activities, and work-related activities with less than or equal to 5/10 pain and maximal function.  PC   Balance: Complete Encan[place]ion Balance Scale and set goals accordingly.  PC   Coordination: Patient will demonstrate improved time of 11 sec on TUG to demonstrate improved coordination and safety with mobility. PC   HEP: Patient educated on HEP in preparation for d/c verbalizing and demonstrating good understanding of topics discussed.   PC            Goals Key:  PC= progressing/continue;        PM= partially met;              DC= discontinue    PLAN     Continue Plan of Care (POC) and progress per patient tolerance. See Treatment section for exercise progression.    Eugenie Stone, PT

## 2023-03-16 ENCOUNTER — CLINICAL SUPPORT (OUTPATIENT)
Dept: REHABILITATION | Facility: HOSPITAL | Age: 83
End: 2023-03-16
Payer: MEDICARE

## 2023-03-16 DIAGNOSIS — R47.01 APHASIA: Primary | ICD-10-CM

## 2023-03-16 DIAGNOSIS — Z74.09 IMPAIRED FUNCTIONAL MOBILITY, BALANCE, GAIT, AND ENDURANCE: Primary | ICD-10-CM

## 2023-03-16 DIAGNOSIS — R41.841 COGNITIVE COMMUNICATION DISORDER: ICD-10-CM

## 2023-03-16 PROCEDURE — 97130 THER IVNTJ EA ADDL 15 MIN: CPT

## 2023-03-16 PROCEDURE — 97129 THER IVNTJ 1ST 15 MIN: CPT

## 2023-03-16 PROCEDURE — 97112 NEUROMUSCULAR REEDUCATION: CPT

## 2023-03-16 PROCEDURE — 97110 THERAPEUTIC EXERCISES: CPT

## 2023-03-16 NOTE — PROGRESS NOTES
[facility]   Physical Therapy Treatment Note     Name: Leslie CASTELLON [facility] Number: [id number]    Therapy Diagnosis:   Encounter Diagnosis   Name Primary?    Impaired functional mobility, balance, gait, and endurance Yes     Physician: Sepideh Peters,*    Visit Date: 3/16/2023    Physician Orders: PT Eval and Treat   Medical Diagnosis from Referral: R29.6 (ICD-10-CM) - Falls frequently  Evaluation Date: 2/27/2023  Authorization Period Expiration: 2/17/24  Plan of Care Expiration: 4/28/23  Progress Note Due: 3/27/23  Visit # / Visits authorized: 2/12 (+eval)  FOTO: 0/3 time constraints     Precautions: Standard and Fall     PTA Visit #: 0/5     Time In: 0935  Time Out: 1015  Total Billable Time: 40 minutes    SUBJECTIVE     Pt reports: That she is ok.  Her granddaughter attended session and requested that patient be seen by ortho PT 1day/week for dry needling to her back since this is her number 1 complaint at this time.  He presented with sandals with heels today.   She reports that she was not compliant with Home Exercise Program as she left it in her granddaughter's car.   Response to previous treatment: She responded well to last session.   Functional change: She reports no functional change since last session.     Pain: 7/10  Location: low back     OBJECTIVE     Objective Measures updated at progress report unless specified.     Treatment     Leslie received the treatments listed below:      THERAPEUTIC EXERCISES to develop strength, endurance, ROM, flexibility, posture, and core stabilization for (10) minutes including:    Intervention Performed Today    Supine Stretches  - 3x30 seconds bilateral for each:  - lower trunk rotation   - single knee to chest  - hamstring/ heelcord   Seated Stretches  - 3x30 seconds bilateral for each:  - hamstring/ heelcord  - hip adductor   Standing Stretches  - 3x15 seconds bilateral for each:  - IT band   - heelcord    Seated lower extremity  exercises     X  X  X   - hip flexion 2x10 bilateral   - hamstring curl 2x10 bilateral   - dorsiflexion/ plantarflexion 2x10 bilateral with green band  - eversion 1x10 bilateral   - toe curls and flares 1x20 bilateral   - hip adduction against ball 1x10 with 3 second hold  - hip abduction against belt 1x10 with 3 second hold   Standing lower extremity exercises   - hip flexion 2x10 bilateral   - hip abduction 2x10 bilateral   - hip extension 2x10 bilateral   - heel raise 2x10    NuStep for endurance x - Level 2, 5 minutes                    neuromuscular re-education activities to improve: Balance, Coordination, Kinesthetic, Sense, Proprioception, and Posture for (30) minutes. The following activities were included:    Intervention Performed Today    Air Ex Beam   X  X     - Tandem holds 5 seconds, 5x's bilateral   - Tandem walking forward 4x's  - Side stepping 4x's each direction  - tandem walking over hurdles forward and side stepping 2x's each  - single leg standing while tapping opposite foot forward and back on beam   Orange Rubber Beam    - Tandem holds 3 seconds, 3x's bilateral   - Tandem walking forward 4x's   - Side stepping 2x's each direction  - single leg standing while tapping opposite foot forward and back on beam   Air Ex Pad         X     - Weight shifts Left to Right 2x10  - Weight shifts forward/back 2x10  - single leg standing holds   - Eyes closed 10 seconds  - backward pivot steps with 1 hand support 1x8 bilateral   - standing on pad while toss/catching ball to wall 1x8  - One lower extremity on pad and opposite lower extremity on step tossing ball to wall    Hand paddled per Neuro LEONIE technique and for entirety of session     Core Exercises in supine  Bilateral lower extremities over Tball for each:   - lower trunk rotation 2x10  - bilateral knees to chest 2x10  - posterior pelvic tilt to bridge 2x10   Parallel bars X     - walking backward 2x8 ft with 1 hand support  - narrow base standing  without hand support performing head turns/nods 1x5 each, arm reach/lifts 1x5 each   NuStep for coordination  x - Level 2, 5 minutes   Trunk extension for posture x - 2x10 over pillow and ball at low back    Neoprene wrap to Right knee for entire session      THERAPEUTIC ACTIVITIES to improve dynamic and functional performance for () minutes including:    Intervention Performed Today    Patient and her granddaughter educated on Home Exercise Program for balance.  Also educated on wearing tennis shoes to future sessions.  Discussed the process of changing schedule to get her seen by an ortho PT 1 day/week then by me 1day/week  - Patient and granddaughter verbalized good understanding of education provided.                                              Gait Training: to improve functional mobility and safety for 0  minutes, including:  -     Patient Education and Home Exercises     Home Exercises Provided and Patient Education Provided     Education provided:   - 3/14/23 Patient educated on Home Exercise Program for balance   - 3/16/23 Patient encouraged to stay compliant with Home Exercise Program   Written Home Exercises Provided: yes. Exercises were reviewed and Leslie was able to demonstrate them prior to the end of the session.  Leslie verbalized good  understanding of the education provided. See EMR under Patient Instructions for exercises provided during therapy sessions    ASSESSMENT     Patient responded well to session today with good participation.  She was able to perform several balance activities with only 1 hand support, but continues to be limited by cognitive deficits for carryover of strategies and gains.  She is also limited by Right knee pain, but responded well to wrap on knee to allow her to fully participate. She has good potential to make gains with continued PT and compliance of Home Exercise Program.     Leslie Is progressing well towards her goals.   Pt prognosis is Good.     Pt will continue to  benefit from skilled outpatient physical therapy to address the deficits listed in the problem list box on initial evaluation, provide pt/family education and to maximize pt's level of independence in the home and community environment.     Pt's spiritual, cultural and educational needs considered and pt agreeable to plan of care and goals.     Anticipated barriers to physical therapy: co-morbidities, sedentary lifestyle, chronicity of condition, and lack of understanding of condition     GOALS:     Short Term Goals:  4 weeks Progress    Function: Patient will demonstrate improved function as indicated by a functional limitation score improved by 3 points from initial measure.  PC   Strength: Patient will demonstrate improved strength by performing 5x sit to  20 seconds in order to progress towards independence with functional activities.  PC   Balance: Complete Encarnacion Balance Scale and set goals accordingly.  PC   Coordination: Patient will demonstrate improved time of 13 sec on TUG to demonstrate improved coordination and safety with mobility. PC   HEP: Patient will demonstrate independence with HEP in order to progress toward functional independence.     Determine the need of Custom Wheelchair Eval to improve patient's positioning and independence with pressure reliefs and independence with home and community mobility to decrease burden of care.              Long Term Goals:  8 weeks Progress   Function: Patient will demonstrate improved function as indicated by a functional limitation score improved 5 points from initial measure.  PC   Strength: Patient will demonstrate improved strength by performing 5x sit to  17 seconds in order to progress towards independence with functional activities.  PC   Patient will return to ADL's, IADL's, community involvement, recreational activities, and work-related activities with less than or equal to 5/10 pain and maximal function.  PC   Balance: Complete Encarnacion  Balance Scale and set goals accordingly.  PC   Coordination: Patient will demonstrate improved time of 11 sec on TUG to demonstrate improved coordination and safety with mobility. PC   HEP: Patient educated on HEP in preparation for d/c verbalizing and demonstrating good understanding of topics discussed.   PC            Goals Key:  PC= progressing/continue;        PM= partially met;             DC= discontinue    PLAN     Continue Plan of Care (POC) and progress per patient tolerance. See Treatment section for exercise progression.    Eugenie Stone, PT

## 2023-03-16 NOTE — PROGRESS NOTES
[facility]  Speech Therapy Treatment Note- Neurological Rehabilitation    Date: 3/16/2023     Name: Leslie Wood   MRN: [id number]   Therapy Diagnosis:   Encounter Diagnoses   Name Primary?    Aphasia Yes    Cognitive communication disorder      Physician: Sammy Juarez MD  Physician Orders: [id number] - AMB REFERRAL/CONSULT TO SPEECH THERAPY  Medical Diagnosis: R47.01 (ICD-10-CM) - Aphasia    Visit #/ Visits Authorized: 4/10  Date of Evaluation:  2/17/2023   Insurance Authorization Period: 2/23/2023 - 12/31/2023   Plan of Care Expiration Date:    4/12/2023   Extended Plan of Care:  NA   Progress Note: 3/20/2023    Visits Cancelled: 0  Visits No Show: 0    Time In:  8:45 AM   Time Out:  9:32 AM   Total Billable Time: 47 minutes      Precautions: Standard  Subjective:   Patient reports: Patient arrived her to appointment with her supportive granddaughter. She reported she had an anxiety attack last session when she was brought to the hospital but is feeling much better today.   She was compliant to home exercise program.   Response to previous treatment: Recalled some education provided at last session.   Pain Scale:  8/10 on a Visual Analog Scale currently.   Pain Location: bilateral lower back   Objective:   TIMED  Procedure Min.   Cognitive Therapeutic Interventions, first 15 minutes CPT 28066  15   Cognitive Therapeutic Interventions, each additional 15 minutes CPT 46920  32         UNTIMED  Procedure Min.   Speech- Language- Voice Therapy  0     Total Timed Units: 3  Total Untimed Units: 0  Charges Billed/Number of units: 84338/1; 44581/2    Short Term Goals: (6 weeks) Current Progress:   Patient will describe functional objects/images to facilitate a strategy of description in order to increase transfer of intended message with 80% accuracy and moderate clinician cueing.     Progressing/ Not Met 3/16/2023   Not formally addressed.     Patient will complete word finding task (i.e. Creating subject, verb,  object pairs in VNEST protocol) with 90% accuracy moderate assistance to improve word fluency.     Progressing/ Not Met 3/16/2023   Not formally addressed.    Patient will utilize word finding strategies in structured tasks with 70% accuracy and minimal cues.      Progressing/ Not Met 3/16/2023   Circumlocution word finding strategies discussed today. Patient given handout to review as part of her HEP.    Patient will summarize read material to improve word fluency, word finding, and reading comprehension in Attentive Reading and Constrained Summarization (ARCS) protocol with 80% accuracy and minimal verbal assistance across 2-3 sessions.     Progressing/ Not Met 3/16/2023   Not formally addressed.      Patient will write 7-10 word sentences with no more than 2 syntactic/grammatical errors given minimal cueing.      Progressing/ Not Met 3/16/2023   Not formally addressed.      Patient will complete functional writing task with complete, coherent, and accurately spelled responses with 80% accuracy and minimal verbal and visual assistance across 3-5 sessions.     Progressing/ Not Met 3/16/2023   Not formally addressed.      Patient will complete RBANS assessment to further assess cognitive communication skills.        GOAL MET 2/27/2023  Completed today. See below for results.       GOAL MET 2/27/2023    Patient will sustain attention to a moderate task (auditory or visual) for 2 minutes with 90% accuracy or no more than 1 redirection.     Visual: Met x2  Auditory: Met x2 VISUAL:   Task 1: Visual sustained attention task completed in which patient visually scanned an article looking for certain letters. She completed this task with 91% accuracy independently (61/68). She utilized a blank paper to keep track of which line she was working on .     AUDITORY:  Task 1: Constant Therapy: Understanding Voicemails was completed today with 70% accuracy independently. She required some assistance for navigating the application  (pressing the next button, replaying the voicemail)    Task 2: Following directions you hear: Level 1: Patient completed this task with 95% accuracy independently.    Patient will complete selective attention tasks (auditory or visual) with 90% accuracy independently increase selective attention. Not formally addressed.    Patient will use attention shifting strategies to shift attention between two tasks (auditory or visual) with no more than 3 cues or 90% accuracy to improve alternating attention. Not formally addressed.    Patient will complete short term recall tasks after a 5 minutes delay with 90% accuracy  independently  with use of memory strategies to improve recall of information and generalization of memory strategies. Not formally addressed.    Patient will use Goal Plan Action Review strategy to complete moderate to complex reasoning, planning, or organization tasks with 90% accuracy independently to improve functional executive function skills. Not formally addressed.    Patient will complete a functional task to improve attention, memory and/or executive functions (I.e. sample bill paying activity, recipe, or multiple choice comprehension questions to 1 paragraphs) with 80% accuracy and natural environment noise distractions (TV news background, music, etc.). Not formally addressed.    Patient will be educated regarding cognitive deficits as applicable to the patient's life for increased awareness and will execute returned demonstration of information with 80% accuracy.           GOAL MET 3/6/2023  Education provided regarding results from her assessment, the hierarchy of neuropsychological functions, and the role of attention in other executive functions. Patient and granddaughter verbalized understanding.     GOAL MET 3/6/2023    Patient will independently generate strategies to improve ability to complete tasks with enhanced accuracy and time, based on review of objective previous performance on  trials of functional tasks with 80% accuracy.  Not formally addressed.    Given implementation of strategies, patient will complete a task following initial attempt with 90% accuracy and reported reduced number on the relative scale of cognitive load when compared to previous trial.  Not formally addressed.    Patient will use external memory strategies in order to recall important dates, events, appointments, or tasks to be completed with 90% accuracy independently.  Not formally addressed.        Patient Education/Response:   Patient and her granddaughter were educated on the cognitive-communication strategies and word finding strategies. They verbalized understanding of all discussed.     Home program established: yes-Patient provided word finding strategy handout  Exercises were reviewed and Leslie was able to demonstrate them prior to the end of the session.  Leslie demonstrated good  understanding of the education provided.     See Electronic Medical Record under Patient Instructions for exercises provided throughout therapy.  Assessment:   Leslie is progressing well towards her goals. She is motivated to improve and participates in all therapy tasks. Current goals remain appropriate. Goals will be updated as needed.     Patient prognosis is Good. Patient will continue to benefit from skilled outpatient speech and language therapy to address the deficits listed in the problem list on initial evaluation, provide patient/family education and to maximize patient's level of independence in the home and community environment.   Medical necessity is demonstrated by the following IMPAIRMENTS:  Language deficits impact overall quality of life, ability to participation in social and community interactions, and the ability to communicate important medical information if needed.   Cognitive-communication deficits impact overall quality of life and the ability to safely and independently complete activities of daily living.    Barriers to Therapy: none   Patient's spiritual, cultural and educational needs considered and patient agreeable to plan of care and goals.  Plan:   Continue Plan of Care with focus on attention, executive functions, language.     Becca Blanco, CCC-SLP, CBIS   Speech Language Pathologist   Certified Brain Injury Specialist   3/16/2023

## 2023-03-20 ENCOUNTER — CLINICAL SUPPORT (OUTPATIENT)
Dept: REHABILITATION | Facility: HOSPITAL | Age: 83
End: 2023-03-20
Payer: MEDICARE

## 2023-03-20 DIAGNOSIS — R41.841 COGNITIVE COMMUNICATION DISORDER: ICD-10-CM

## 2023-03-20 DIAGNOSIS — R47.01 APHASIA: Primary | ICD-10-CM

## 2023-03-20 DIAGNOSIS — Z74.09 IMPAIRED FUNCTIONAL MOBILITY, BALANCE, GAIT, AND ENDURANCE: Primary | ICD-10-CM

## 2023-03-20 PROCEDURE — 97110 THERAPEUTIC EXERCISES: CPT

## 2023-03-20 PROCEDURE — 97129 THER IVNTJ 1ST 15 MIN: CPT

## 2023-03-20 PROCEDURE — 97140 MANUAL THERAPY 1/> REGIONS: CPT

## 2023-03-20 PROCEDURE — 97130 THER IVNTJ EA ADDL 15 MIN: CPT

## 2023-03-20 NOTE — PROGRESS NOTES
[facility]   Physical Therapy Treatment Note     Name: Leslie CASTELLON [facility] Number: [id number]    Therapy Diagnosis:   Encounter Diagnosis   Name Primary?    Impaired functional mobility, balance, gait, and endurance Yes     Physician: Sepideh Peters,*    Visit Date: 3/20/2023    Physician Orders: PT Eval and Treat   Medical Diagnosis from Referral: R29.6 (ICD-10-CM) - Falls frequently  Evaluation Date: 2/27/2023  Authorization Period Expiration: 2/17/24  Plan of Care Expiration: 4/28/23  Progress Note Due: 3/27/23  Visit # / Visits authorized: 3/12 (+eval)  FOTO: 0/3 time constraints     Precautions: Standard and Fall     PTA Visit #: 0/5     Time In: 1045  Time Out: 1145  Total Billable Time: 60 minutes    SUBJECTIVE     Pt reports: that she does experience a bit of pain at the lower back region.  She also reports of right knee and right shoulder pain.  She reports that she was not compliant with Home Exercise Program as she left it in her granddaughter's car.   Response to previous treatment: She responded well to last session.   Functional change: She reports no functional change since last session.     Pain: 5/10  Location: low back     OBJECTIVE     Objective Measures updated at progress report unless specified.     Lumbar Spine ROM: FB 75% pain lumbosacral and thoracic, BB neutral lumbosacral pain, R SB 66%, L SB 66%, R Rto 50%, L Rot 50% left thoracic and lumbosacral pain    R Knee ROM: Flex 50-66%    Palpation: Trigger points on palpation of bilateral sacral multifidus; also osteophyte palpated the right lateral knee compartment  Treatment     Leslie received the treatments listed below:      THERAPEUTIC EXERCISES to develop strength, endurance, ROM, flexibility, posture, and core stabilization for (30) minutes including:    Intervention Performed Today    Supine Stretches  - 3x30 seconds bilateral for each:  - lower trunk rotation   - single knee to chest  - hamstring/  heelcord   Seated Stretches  - 3x30 seconds bilateral for each:  - hamstring/ heelcord  - hip adductor   Standing Stretches  - 3x15 seconds bilateral for each:  - IT band   - heelcord    Seated lower extremity exercises        - hip flexion 2x10 bilateral   - hamstring curl 2x10 bilateral   - dorsiflexion/ plantarflexion 2x10 bilateral with green band  - eversion 1x10 bilateral   - toe curls and flares 1x20 bilateral   - hip adduction against ball 1x10 with 3 second hold  - hip abduction against belt 1x10 with 3 second hold   Standing lower extremity exercises   - hip flexion 2x10 bilateral   - hip abduction 2x10 bilateral   - hip extension 2x10 bilateral   - heel raise 2x10    NuStep for endurance x - Level 2, 6 minutes   Sitting trunk flexion x 10 second holds x 2 minutes   Seated heel slides  x 10 second holds x 2 minutes   Lower trunk rotations x 10 second holds x 2 minutes   Pelvic tilts x 10 second holds x 2 minutes   Heel slides to quad sets x 10 second holds x 3 minutes     neuromuscular re-education activities to improve: Balance, Coordination, Kinesthetic, Sense, Proprioception, and Posture for (0) minutes. The following activities were included:    Intervention Performed Today    Air Ex Beam        - Tandem holds 5 seconds, 5x's bilateral   - Tandem walking forward 4x's  - Side stepping 4x's each direction  - tandem walking over hurdles forward and side stepping 2x's each  - single leg standing while tapping opposite foot forward and back on beam   Orange Rubber Beam    - Tandem holds 3 seconds, 3x's bilateral   - Tandem walking forward 4x's   - Side stepping 2x's each direction  - single leg standing while tapping opposite foot forward and back on beam   Air Ex Pad              - Weight shifts Left to Right 2x10  - Weight shifts forward/back 2x10  - single leg standing holds   - Eyes closed 10 seconds  - backward pivot steps with 1 hand support 1x8 bilateral   - standing on pad while toss/catching ball  to wall 1x8  - One lower extremity on pad and opposite lower extremity on step tossing ball to wall    Hand paddled per Neuro LEONIE technique and for entirety of session     Core Exercises in supine  Bilateral lower extremities over Tball for each:   - lower trunk rotation 2x10  - bilateral knees to chest 2x10  - posterior pelvic tilt to bridge 2x10   Parallel bars      - walking backward 2x8 ft with 1 hand support  - narrow base standing without hand support performing head turns/nods 1x5 each, arm reach/lifts 1x5 each   NuStep for coordination   - Level 2, 5 minutes   Trunk extension for posture  - 2x10 over pillow and ball at low back    Neoprene wrap to Right knee for entire session      THERAPEUTIC ACTIVITIES to improve dynamic and functional performance for () minutes including:    Intervention Performed Today    Patient and her granddaughter educated on Home Exercise Program for balance.  Also educated on wearing tennis shoes to future sessions.  Discussed the process of changing schedule to get her seen by an ortho PT 1 day/week then by me 1day/week  - Patient and granddaughter verbalized good understanding of education provided.                                            MANUAL THERAPY TECHNIQUES were applied for (30) minutes, including:    Manual Intervention Performed Today    Soft Tissue Mobilization x STM performed at lumbosacral region    Joint Mobilizations x Patellar inferior/superior moilization, PA grade 2-3 lumbar spine   Mobilization with movement          Functional Dry Needling  x Performed at sacral multifidus bilaterally     Plan for Next Visit:         Gait Training: to improve functional mobility and safety for 0  minutes, including:  -     Patient Education and Home Exercises     Home Exercises Provided and Patient Education Provided     Education provided:   - 3/14/23 Patient educated on Home Exercise Program for balance   - 3/16/23 Patient encouraged to stay compliant with Home Exercise  Program   Written Home Exercises Provided: yes. Exercises were reviewed and Leslie was able to demonstrate them prior to the end of the session.  Leslie verbalized good  understanding of the education provided. See EMR under Patient Instructions for exercises provided during therapy sessions    ASSESSMENT     Patient was attended to PT by her granddaughter today.  She reports that her grandmother received dorsal rhizotomy but experienced only a few days of pain relief.  The patient does report of pain at the sacral multifidus.  On palpation, there is pain in the region.  Also some increased pain with lumbar spine ROM.  There is also report of pain and significant stiffness at the right knee.  Did report of pain relief with previous viscosupplementation at the right knee a few years ago.  Will see Dr. Ibarra in a few days to determine next course of action for the knee.  Did perform FDN at the sacral multifidus.  Will monitor to determine effects of FDN.    Leslie Is progressing well towards her goals.   Pt prognosis is Good.     Pt will continue to benefit from skilled outpatient physical therapy to address the deficits listed in the problem list box on initial evaluation, provide pt/family education and to maximize pt's level of independence in the home and community environment.     Pt's spiritual, cultural and educational needs considered and pt agreeable to plan of care and goals.     Anticipated barriers to physical therapy: co-morbidities, sedentary lifestyle, chronicity of condition, and lack of understanding of condition     GOALS:     Short Term Goals:  4 weeks Progress    Function: Patient will demonstrate improved function as indicated by a functional limitation score improved by 3 points from initial measure.  PC   Strength: Patient will demonstrate improved strength by performing 5x sit to  20 seconds in order to progress towards independence with functional activities.  PC   Balance: Complete [place]  Balance Scale and set goals accordingly.  PC   Coordination: Patient will demonstrate improved time of 13 sec on TUG to demonstrate improved coordination and safety with mobility. PC   HEP: Patient will demonstrate independence with HEP in order to progress toward functional independence.     Determine the need of Custom Wheelchair Eval to improve patient's positioning and independence with pressure reliefs and independence with home and community mobility to decrease burden of care.              Long Term Goals:  8 weeks Progress   Function: Patient will demonstrate improved function as indicated by a functional limitation score improved 5 points from initial measure.  PC   Strength: Patient will demonstrate improved strength by performing 5x sit to  17 seconds in order to progress towards independence with functional activities.  PC   Patient will return to ADL's, IADL's, community involvement, recreational activities, and work-related activities with less than or equal to 5/10 pain and maximal function.  PC   Balance: Complete [place] Balance Scale and set goals accordingly.  PC   Coordination: Patient will demonstrate improved time of 11 sec on TUG to demonstrate improved coordination and safety with mobility. PC   HEP: Patient educated on HEP in preparation for d/c verbalizing and demonstrating good understanding of topics discussed.   PC            Goals Key:  PC= progressing/continue;        PM= partially met;             DC= discontinue    PLAN     Continue Plan of Care (POC) and progress per patient tolerance. See Treatment section for exercise progression.    Leoncio Lujan, PT

## 2023-03-20 NOTE — PROGRESS NOTES
[facility]  Speech Therapy Progress Note- Neurological Rehabilitation    Date: 3/20/2023     Name: Leslie Wood   MRN: [id number]   Therapy Diagnosis:   Encounter Diagnoses   Name Primary?    Aphasia Yes    Cognitive communication disorder      Physician: Sammy Juarez MD  Physician Orders: [id number] - AMB REFERRAL/CONSULT TO SPEECH THERAPY  Medical Diagnosis: R47.01 (ICD-10-CM) - Aphasia    Visit #/ Visits Authorized: 5/10  Date of Evaluation:  2/17/2023   Insurance Authorization Period: 2/23/2023 - 12/31/2023   Plan of Care Expiration Date:    4/12/2023   Extended Plan of Care:  NA   Progress Note: 3/20/2023    Visits Cancelled: 0  Visits No Show: 0    Time In:  9:55 AM   Time Out: 10:45 AM   Total Billable Time: 55 minutes      Precautions: Standard  Subjective:   Patient reports: Patient arrived her to appointment with her supportive granddaughter. No significant changes since last session.   She was compliant to home exercise program.   Response to previous treatment: Recalled some education provided at last session.   Pain Scale:  8/10 on a Visual Analog Scale currently.   Pain Location: bilateral lower back   Objective:   TIMED  Procedure Min.   Cognitive Therapeutic Interventions, first 15 minutes CPT 21328  15   Cognitive Therapeutic Interventions, each additional 15 minutes CPT 08032  40         UNTIMED  Procedure Min.   Speech- Language- Voice Therapy  0     Total Timed Units: 4  Total Untimed Units: 0  Charges Billed/Number of units: 88819/1; 11300/3    Short Term Goals: (6 weeks) Current Progress:   Patient will describe functional objects/images to facilitate a strategy of description in order to increase transfer of intended message with 80% accuracy and moderate clinician cueing.     Progressing/ Not Met 3/20/2023   Not formally addressed.     Patient will complete word finding task (i.e. Creating subject, verb, object pairs in VNEST protocol) with 90% accuracy moderate assistance to improve  word fluency.     Met x1    Progressing/ Not Met 3/20/2023   Completed X2 today (cook, read). Patient able to generate subject/objects with 90% accuracy given minimal cueing to use different objects and to generate appropriate subjects (Patient often wrote please cook..as opposed to a name/pronoun as a subject).    Patient will utilize word finding strategies in structured tasks with 70% accuracy and minimal cues.      Progressing/ Not Met 3/20/2023   Circumlocution word finding strategies discussed today. Patient given handout to review as part of her HEP.    Patient will summarize read material to improve word fluency, word finding, and reading comprehension in Attentive Reading and Constrained Summarization (ARCS) protocol with 80% accuracy and minimal verbal assistance across 2-3 sessions.     Progressing/ Not Met 3/20/2023   Not formally addressed.      Patient will write 7-10 word sentences with no more than 2 syntactic/grammatical errors given minimal cueing.      Progressing/ Not Met 3/20/2023   Not formally addressed.      Patient will complete functional writing task with complete, coherent, and accurately spelled responses with 80% accuracy and minimal verbal and visual assistance across 3-5 sessions.     Progressing/ Not Met 3/20/2023   Not formally addressed.      Patient will complete RBANS assessment to further assess cognitive communication skills.        GOAL MET 2/27/2023  Completed today. See below for results.       GOAL MET 2/27/2023    Patient will sustain attention to a moderate task (auditory or visual) for 2 minutes with 90% accuracy or no more than 1 redirection.     Visual: GOAL MET 3/20/2023  Auditory: GOAL MET 3/20/2023  VISUAL:   Task 1: Visual sustained attention task completed in which patient visually scanned an article looking for certain letters. She completed this task with 87% accuracy independently (36/41). Clinician declan a purple line on the left hand side to serve an anchor.  Patient utilized a blank paper to keep track of which line she was working on.     Task 2: Visual sustained attention task completed in which patient visually scanned a phone bill looking for unexplained calls. She completed this task with 90% accuracy given minimal cueing. The one error that she made was due to skipping over an area code that was more offset to the left than the others. Clinician and patient dicussed left neglect in depth that is likely due to her first cerebral vascular accident (right cerebral vascular accident).     AUDITORY:  Task 1: Constant Therapy: Understanding Voicemails was completed today with 90% accuracy independently (improvement from last session with 70% accuracy). She required minimal assistance for navigating the application (pressing the next button, replaying the voicemail)    GOAL MET 3/20/2023    Patient will complete selective attention tasks (auditory or visual) with 90% accuracy independently increase selective attention. Not formally addressed.    Patient will use attention shifting strategies to shift attention between two tasks (auditory or visual) with no more than 3 cues or 90% accuracy to improve alternating attention. Not formally addressed.    Patient will complete short term recall tasks after a 5 minutes delay with 90% accuracy  independently  with use of memory strategies to improve recall of information and generalization of memory strategies. Not formally addressed.    Patient will use Goal Plan Action Review strategy to complete moderate to complex reasoning, planning, or organization tasks with 90% accuracy independently to improve functional executive function skills. Not formally addressed.    Patient will complete a functional task to improve attention, memory and/or executive functions (I.e. sample bill paying activity, recipe, or multiple choice comprehension questions to 1 paragraphs) with 80% accuracy and natural environment noise distractions (TV news  background, music, etc.). Not formally addressed.    Patient will be educated regarding cognitive deficits as applicable to the patient's life for increased awareness and will execute returned demonstration of information with 80% accuracy.           GOAL MET 3/6/2023  Education provided regarding results from her assessment, the hierarchy of neuropsychological functions, and the role of attention in other executive functions. Patient and granddaughter verbalized understanding.     GOAL MET 3/6/2023    Patient will independently generate strategies to improve ability to complete tasks with enhanced accuracy and time, based on review of objective previous performance on trials of functional tasks with 80% accuracy.  Not formally addressed.    Given implementation of strategies, patient will complete a task following initial attempt with 90% accuracy and reported reduced number on the relative scale of cognitive load when compared to previous trial.  Not formally addressed.    Patient will use external memory strategies in order to recall important dates, events, appointments, or tasks to be completed with 90% accuracy independently.  Not formally addressed.        Patient Education/Response:   Patient and her granddaughter were educated on the cognitive-communication strategies and word finding strategies. They verbalized understanding of all discussed.     Home program established: yes-Patient provided word finding strategy handout  Exercises were reviewed and Leslie was able to demonstrate them prior to the end of the session.  Leslie demonstrated good  understanding of the education provided.     See Electronic Medical Record under Patient Instructions for exercises provided throughout therapy.  Assessment:   PROGRESS:   Leslie is progressing well towards her goals. She is motivated to improve and participates in all therapy tasks. She has met three goals since the initiation of therapy. Sustained attention goal for  both visual and auditory contexts met today. She demonstrates ability to use strategies given minimal cueing. She will benefit from continued speech therapy to address the above deficits and to utilize strategies as needed. Current goals remain appropriate. Goals will be updated as needed.     Patient prognosis is Good. Patient will continue to benefit from skilled outpatient speech and language therapy to address the deficits listed in the problem list on initial evaluation, provide patient/family education and to maximize patient's level of independence in the home and community environment.   Medical necessity is demonstrated by the following IMPAIRMENTS:  Language deficits impact overall quality of life, ability to participation in social and community interactions, and the ability to communicate important medical information if needed.   Cognitive-communication deficits impact overall quality of life and the ability to safely and independently complete activities of daily living.   Barriers to Therapy: none   Patient's spiritual, cultural and educational needs considered and patient agreeable to plan of care and goals.  Plan:   Continue Plan of Care with focus on attention, executive functions, language.     Becca Blanco, CCC-SLP, CBIS   Speech Language Pathologist   Certified Brain Injury Specialist   3/20/2023

## 2023-03-23 ENCOUNTER — CLINICAL SUPPORT (OUTPATIENT)
Dept: REHABILITATION | Facility: HOSPITAL | Age: 83
End: 2023-03-23
Payer: MEDICARE

## 2023-03-23 DIAGNOSIS — R41.841 COGNITIVE COMMUNICATION DISORDER: ICD-10-CM

## 2023-03-23 DIAGNOSIS — R47.01 APHASIA: Primary | ICD-10-CM

## 2023-03-23 DIAGNOSIS — Z74.09 IMPAIRED FUNCTIONAL MOBILITY, BALANCE, GAIT, AND ENDURANCE: Primary | ICD-10-CM

## 2023-03-23 PROCEDURE — 97130 THER IVNTJ EA ADDL 15 MIN: CPT

## 2023-03-23 PROCEDURE — 97110 THERAPEUTIC EXERCISES: CPT

## 2023-03-23 PROCEDURE — 97112 NEUROMUSCULAR REEDUCATION: CPT

## 2023-03-23 PROCEDURE — 97129 THER IVNTJ 1ST 15 MIN: CPT

## 2023-03-23 NOTE — PROGRESS NOTES
[facility]   Physical Therapy Treatment Note     Name: Leslie CASTELLON [facility] Number: [id number]    Therapy Diagnosis:   Encounter Diagnosis   Name Primary?    Impaired functional mobility, balance, gait, and endurance Yes     Physician: Sepideh Peters,*    Visit Date: 3/23/2023    Physician Orders: PT Eval and Treat   Medical Diagnosis from Referral: R29.6 (ICD-10-CM) - Falls frequently  Evaluation Date: 2/27/2023  Authorization Period Expiration: 2/17/24  Plan of Care Expiration: 4/28/23  Progress Note Due: 3/27/23  Visit # / Visits authorized: 4/12 (+eval)  FOTO: 0/3 time constraints     Precautions: Standard and Fall     PTA Visit #: 0/5     Time In: 1007  Time Out: 1050  Total Billable Time: 43 minutes    SUBJECTIVE     Pt reports: That she is ok.  She reports taking a pain pill this am.  She presented to clinic without her rollator today.   She reports that she was not compliant with Home Exercise Program as she left it in her granddaughter's car.   Response to previous treatment: She responded well to last session.   Functional change: She reports no functional change since last session.     Pain: 0/10  Location: low back     OBJECTIVE     Objective Measures updated at progress report unless specified.     Treatment     Leslie received the treatments listed below:      THERAPEUTIC EXERCISES to develop strength, endurance, ROM, flexibility, posture, and core stabilization for (12) minutes including:    Intervention Performed Today    Supine Stretches  - 3x30 seconds bilateral for each:  - lower trunk rotation   - single knee to chest  - hamstring/ heelcord   Seated Stretches  - 3x30 seconds bilateral for each:  - hamstring/ heelcord  - hip adductor   Standing Stretches  - 3x15 seconds bilateral for each:  - IT band   - heelcord    Seated lower extremity exercises          - hip flexion 2x10 bilateral   - hamstring curl 2x10 bilateral   - dorsiflexion/ plantarflexion 2x10  bilateral with green band  - eversion 1x10 bilateral   - toe curls and flares 1x20 bilateral   - hip adduction against ball 1x10 with 3 second hold  - hip abduction against belt 1x10 with 3 second hold   Standing lower extremity exercises   - hip flexion 2x10 bilateral   - hip abduction 2x10 bilateral   - hip extension 2x10 bilateral   - heel raise 2x10    NuStep for endurance x - Level 1-2, 7 minutes   Supine X  X - Quad set with short arc quad 2x10 with 3 second hold   - dorsiflexion against green band 2x10 Right                neuromuscular re-education activities to improve: Balance, Coordination, Kinesthetic, Sense, Proprioception, and Posture for (25) minutes. The following activities were included:    Intervention Performed Today    Air Ex Beam   X  X     - Tandem holds 5 seconds, 5x's bilateral   - Tandem walking forward/backward 2x's each  - Side stepping 2x's each direction  - tandem walking over hurdles forward and side stepping 2x's each  - heel lifts and toe lifts off of beam with 2-0 hand supports   - single leg standing while tapping opposite foot forward and back on beam   Orange Rubber Beam    - Tandem holds 3 seconds, 3x's bilateral   - Tandem walking forward 4x's   - Side stepping 2x's each direction  - single leg standing while tapping opposite foot forward and back on beam   Air Ex Pad              - Weight shifts Left to Right 2x10  - Weight shifts forward/back 2x10  - single leg standing holds   - Eyes closed 10 seconds  - backward pivot steps with 1 hand support 1x8 bilateral   - standing on pad while toss/catching ball to wall 1x8  - One lower extremity on pad and opposite lower extremity on step tossing ball to wall    Hand paddled per Neuro LEONIE technique and for entirety of session     Core Exercises in supine  Bilateral lower extremities over Tball for each:   - lower trunk rotation 2x10  - bilateral knees to chest 2x10  - posterior pelvic tilt to bridge 2x10   Parallel bars X  X     -  walking backward 4x8 ft with 2-1 hand support  - braided walking 4x's length of bars  - narrow base standing without hand support performing head turns/nods 1x5 each, arm reach/lifts 1x5 each   Trunk extension for posture  - 2x10 over pillow and ball at low back    Sit to squat without hand support x 2x5 from progressively lowered mat   Neoprene wrap to Right knee for all exercises    THERAPEUTIC ACTIVITIES to improve dynamic and functional performance for () minutes including:    Intervention Performed Today    Patient and her granddaughter educated on Home Exercise Program for balance.  Also educated on wearing tennis shoes to future sessions.  Discussed the process of changing schedule to get her seen by an ortho PT 1 day/week then by me 1day/week  - Patient and granddaughter verbalized good understanding of education provided.                                              MANUAL THERAPY TECHNIQUES were applied for (5) minutes, including:    Manual Intervention Performed Today    Soft Tissue Mobilization []    Joint Mobilizations [x] Right patella superior and inferior glides.     []     []    Functional Dry Needling  []      Plan for Next Visit: Continue as needed         Gait Training: to improve functional mobility and safety for  (5) minutes, including:  - working on increasing step length and upright posture with gait sequence, ambulating 200ft and 100ft with contact guard assist for safety awareness.     Patient Education and Home Exercises     Home Exercises Provided and Patient Education Provided     Education provided:   - 3/14/23 Patient educated on Home Exercise Program for balance   - 3/16/23 Patient encouraged to stay compliant with Home Exercise Program   Written Home Exercises Provided: yes. Exercises were reviewed and Leslie was able to demonstrate them prior to the end of the session.  Leslie verbalized good  understanding of the education provided. See EMR under Patient Instructions for exercises  provided during therapy sessions    ASSESSMENT     Patient responded well to session today with good participation.  She was able to perform several balance activities with only 1 hand support, but continues to be limited by cognitive deficits for carryover of strategies and gains.  She is also limited by Right knee pain only with toe lift exercise. She has good potential to make gains with continued PT and compliance of Home Exercise Program.     Leslie Is progressing well towards her goals.   Pt prognosis is Good.     Pt will continue to benefit from skilled outpatient physical therapy to address the deficits listed in the problem list box on initial evaluation, provide pt/family education and to maximize pt's level of independence in the home and community environment.     Pt's spiritual, cultural and educational needs considered and pt agreeable to plan of care and goals.     Anticipated barriers to physical therapy: co-morbidities, sedentary lifestyle, chronicity of condition, and lack of understanding of condition     GOALS:     Short Term Goals:  4 weeks Progress    Function: Patient will demonstrate improved function as indicated by a functional limitation score improved by 3 points from initial measure.  PC   Strength: Patient will demonstrate improved strength by performing 5x sit to  20 seconds in order to progress towards independence with functional activities.  PC   Balance: Complete Encarn[place] Balance Scale and set goals accordingly.  PC   Coordination: Patient will demonstrate improved time of 13 sec on TUG to demonstrate improved coordination and safety with mobility. PC   HEP: Patient will demonstrate independence with HEP in order to progress toward functional independence.  Met   Determine the need of Custom Wheelchair Eval to improve patient's positioning and independence with pressure reliefs and independence with home and community mobility to decrease burden of care. D/C not needed at this  time.              Long Term Goals:  8 weeks Progress   Function: Patient will demonstrate improved function as indicated by a functional limitation score improved 5 points from initial measure.  PC   Strength: Patient will demonstrate improved strength by performing 5x sit to  17 seconds in order to progress towards independence with functional activities.  PC   Patient will return to ADL's, IADL's, community involvement, recreational activities, and work-related activities with less than or equal to 5/10 pain and maximal function.  PC   Balance: Complete Encarnacion Balance Scale and set goals accordingly.  PC   Coordination: Patient will demonstrate improved time of 11 sec on TUG to demonstrate improved coordination and safety with mobility. PC   HEP: Patient educated on HEP in preparation for d/c verbalizing and demonstrating good understanding of topics discussed.   PC            Goals Key:  PC= progressing/continue;        PM= partially met;             DC= discontinue    PLAN     Continue Plan of Care (POC) and progress per patient tolerance. See Treatment section for exercise progression.    Eugenie Stone, PT

## 2023-03-23 NOTE — PROGRESS NOTES
[facility]  Speech Therapy Treatment Note- Neurological Rehabilitation    Date: 3/23/2023     Name: Leslie Wood   MRN: [id number]   Therapy Diagnosis:   Encounter Diagnoses   Name Primary?    Aphasia Yes    Cognitive communication disorder      Physician: Sammy Juarez MD  Physician Orders: [id number] - AMB REFERRAL/CONSULT TO SPEECH THERAPY  Medical Diagnosis: R47.01 (ICD-10-CM) - Aphasia    Visit #/ Visits Authorized: 6/10  Date of Evaluation:  2/17/2023   Insurance Authorization Period: 2/23/2023 - 12/31/2023   Plan of Care Expiration Date:    4/12/2023   Extended Plan of Care:  NA   Progress Note: 4/20/2023    Visits Cancelled: 0  Visits No Show: 0    Time In:  10:51 AM   Time Out: 11:51 AM   Total Billable Time: 60 minutes      Precautions: Standard  Subjective:   Patient reports: Patient arrived her to alone immediately following her physical therapy session. She reported a decrease in back pain since her last physical therapy appointment. No pain reported currently.   She was compliant to home exercise program.   Response to previous treatment: Recalled some education provided at last session.   Pain Scale:  0/10 on a Visual Analog Scale currently.   Pain Location:  N/A  Objective:   TIMED  Procedure Min.   Cognitive Therapeutic Interventions, first 15 minutes CPT 80898  15   Cognitive Therapeutic Interventions, each additional 15 minutes CPT 43063  45         UNTIMED  Procedure Min.   Speech- Language- Voice Therapy  0     Total Timed Units: 4  Total Untimed Units: 0  Charges Billed/Number of units: 48563/1; 12620/3    Short Term Goals: (6 weeks) Current Progress:   Patient will describe functional objects/images to facilitate a strategy of description in order to increase transfer of intended message with 80% accuracy and moderate clinician cueing.     Progressing/ Not Met 3/23/2023   Not formally addressed.     Patient will complete word finding task (i.e. Creating subject, verb, object pairs in  VNEST protocol) with 90% accuracy moderate assistance to improve word fluency.     Met x2    Progressing/ Not Met 3/23/2023   Completed X2 today (write, drive). Patient able to generate subject/objects with 90% accuracy given minimal-moderate cueing to use different objects and to use the verb provided.    Patient will utilize word finding strategies in structured tasks with 70% accuracy and minimal cues.      Progressing/ Not Met 3/23/2023   Not formally addressed.    Patient will summarize read material to improve word fluency, word finding, and reading comprehension in Attentive Reading and Constrained Summarization (ARCS) protocol with 80% accuracy and minimal verbal assistance across 2-3 sessions.     Progressing/ Not Met 3/23/2023   Not formally addressed.      Patient will write 7-10 word sentences with no more than 2 syntactic/grammatical errors given minimal cueing.      Progressing/ Not Met 3/23/2023   Not formally addressed.      Patient will complete functional writing task with complete, coherent, and accurately spelled responses with 80% accuracy and minimal verbal and visual assistance across 3-5 sessions.     Progressing/ Not Met 3/23/2023   Not formally addressed.      Patient will complete RBANS assessment to further assess cognitive communication skills.        GOAL MET 2/27/2023  Completed today. See below for results.       GOAL MET 2/27/2023    Patient will sustain attention to a moderate task (auditory or visual) for 2 minutes with 90% accuracy or no more than 1 redirection.     Visual: GOAL MET 3/20/2023  Auditory: GOAL MET 3/20/2023  VISUAL:   Task 1: Visual sustained attention task completed in which patient visually scanned an article looking for certain letters. She completed this task with 87% accuracy independently (36/41). Clinician declan a purple line on the left hand side to serve an anchor. Patient utilized a blank paper to keep track of which line she was working on.     Task 2:  "Visual sustained attention task completed in which patient visually scanned a phone bill looking for unexplained calls. She completed this task with 90% accuracy given minimal cueing. The one error that she made was due to skipping over an area code that was more offset to the left than the others. Clinician and patient dicussed left neglect in depth that is likely due to her first cerebral vascular accident (right cerebral vascular accident).     AUDITORY:  Task 1: Constant Therapy: Understanding Voicemails was completed today with 90% accuracy independently (improvement from last session with 70% accuracy). She required minimal assistance for navigating the application (pressing the next button, replaying the voicemail)    GOAL MET 3/20/2023    Patient will complete selective attention tasks (auditory or visual) with 90% accuracy independently increase selective attention. Visual:   Task 1: Patient completed a visual selective attention task by visually scanning a page and finding words and fonts that do not match (for example, a word that reads bold that is not in a bold font would not match). She required moderate-maximum to identify these errors. She also required two blank pages to keep her place (one to cover the completed the items and one to cover the items in the column adjacent). She rated this task a 5/7 on the relative scale of cognitive load.     Task 2: Patient completed visual selective attention task in which she asked to read a short paragraph of information (2-3 sentences) and write answers to questions regarding the paragraph. The patient was able to reference the paragraph if needed when writing answers. "Restaurant background noise" was played in the background. She rated this task a 37 on the relative scale of cognitive load.     Auditory:  Patient completed auditory selective attention task of following one step directions with "restaurant background noise." Constant Therapy: Following " "directions you hear Level 1. Completed with 100% accuracy given initial instruction/cueing and fading to independent.     Patient completed auditory selective attention task of following two step directions with "restaurant background noise." Constant Therapy: Following directions you hear Level 2. Completed with 87% accuracy given moderate-maximum cueing. Significant difficulty noted with two steps as patient had to replay the instructions numerous times per item. Patient rated this task a 2/7 on the relative scale of cognitive load.      Patient will use attention shifting strategies to shift attention between two tasks (auditory or visual) with no more than 3 cues or 90% accuracy to improve alternating attention. Not formally addressed.    Patient will complete short term recall tasks after a 5 minutes delay with 90% accuracy  independently  with use of memory strategies to improve recall of information and generalization of memory strategies. Not formally addressed.    Patient will use Goal Plan Action Review strategy to complete moderate to complex reasoning, planning, or organization tasks with 90% accuracy independently to improve functional executive function skills. Not formally addressed.    Patient will complete a functional task to improve attention, memory and/or executive functions (I.e. sample bill paying activity, recipe, or multiple choice comprehension questions to 1 paragraphs) with 80% accuracy and natural environment noise distractions (TV news background, music, etc.). Not formally addressed.    Patient will be educated regarding cognitive deficits as applicable to the patient's life for increased awareness and will execute returned demonstration of information with 80% accuracy.           GOAL MET 3/6/2023  Education provided regarding results from her assessment, the hierarchy of neuropsychological functions, and the role of attention in other executive functions. Patient and granddaughter " verbalized understanding.     GOAL MET 3/6/2023    Patient will independently generate strategies to improve ability to complete tasks with enhanced accuracy and time, based on review of objective previous performance on trials of functional tasks with 80% accuracy.  Not formally addressed.    Given implementation of strategies, patient will complete a task following initial attempt with 90% accuracy and reported reduced number on the relative scale of cognitive load when compared to previous trial.  Not formally addressed.    Patient will use external memory strategies in order to recall important dates, events, appointments, or tasks to be completed with 90% accuracy independently.  Not formally addressed.        Patient Education/Response:   Patient and her granddaughter were educated on the cognitive-communication strategies and word finding strategies. They verbalized understanding of all discussed.     Home program established: yes-Patient provided word finding strategy handout  Exercises were reviewed and Leslie was able to demonstrate them prior to the end of the session.  Leslie demonstrated good  understanding of the education provided.     See Electronic Medical Record under Patient Instructions for exercises provided throughout therapy.  Assessment:   Leslie is progressing well towards her goals. She is motivated to improve and participates in all therapy tasks. Selective attention goal targeted today. She demonstrates ability to use strategies given minimal cueing. She will benefit from continued speech therapy to address the above deficits and to utilize strategies as needed. Current goals remain appropriate. Goals will be updated as needed.     Patient prognosis is Good. Patient will continue to benefit from skilled outpatient speech and language therapy to address the deficits listed in the problem list on initial evaluation, provide patient/family education and to maximize patient's level of independence  in the home and community environment.   Medical necessity is demonstrated by the following IMPAIRMENTS:  Language deficits impact overall quality of life, ability to participation in social and community interactions, and the ability to communicate important medical information if needed.   Cognitive-communication deficits impact overall quality of life and the ability to safely and independently complete activities of daily living.   Barriers to Therapy: none   Patient's spiritual, cultural and educational needs considered and patient agreeable to plan of care and goals.  Plan:   Continue Plan of Care with focus on attention, executive functions, language.     Becca Blanco, CCC-SLP, CBIS   Speech Language Pathologist   Certified Brain Injury Specialist   3/23/2023

## 2023-03-24 ENCOUNTER — TELEPHONE (OUTPATIENT)
Dept: ORTHOPEDICS | Facility: CLINIC | Age: 83
End: 2023-03-24
Payer: MEDICARE

## 2023-03-24 ENCOUNTER — OFFICE VISIT (OUTPATIENT)
Dept: ORTHOPEDICS | Facility: CLINIC | Age: 83
End: 2023-03-24
Payer: MEDICARE

## 2023-03-24 VITALS — BODY MASS INDEX: 24.87 KG/M2 | WEIGHT: 131.75 LBS | HEIGHT: 61 IN

## 2023-03-24 DIAGNOSIS — Z98.1 HISTORY OF LUMBAR FUSION: ICD-10-CM

## 2023-03-24 DIAGNOSIS — M17.11 PRIMARY OSTEOARTHRITIS OF RIGHT KNEE: Primary | ICD-10-CM

## 2023-03-24 DIAGNOSIS — M17.11 ARTHRITIS OF KNEE, RIGHT: Primary | ICD-10-CM

## 2023-03-24 PROCEDURE — 99214 OFFICE O/P EST MOD 30 MIN: CPT | Mod: 25,S$PBB,, | Performed by: ORTHOPAEDIC SURGERY

## 2023-03-24 PROCEDURE — 99214 PR OFFICE/OUTPT VISIT, EST, LEVL IV, 30-39 MIN: ICD-10-PCS | Mod: 25,S$PBB,, | Performed by: ORTHOPAEDIC SURGERY

## 2023-03-24 PROCEDURE — 99999 PR PBB SHADOW E&M-EST. PATIENT-LVL III: ICD-10-PCS | Mod: PBBFAC,,, | Performed by: ORTHOPAEDIC SURGERY

## 2023-03-24 PROCEDURE — 20610 DRAIN/INJ JOINT/BURSA W/O US: CPT | Mod: PBBFAC,RT | Performed by: ORTHOPAEDIC SURGERY

## 2023-03-24 PROCEDURE — 99213 OFFICE O/P EST LOW 20 MIN: CPT | Mod: PBBFAC,25 | Performed by: ORTHOPAEDIC SURGERY

## 2023-03-24 PROCEDURE — 99999 PR PBB SHADOW E&M-EST. PATIENT-LVL III: CPT | Mod: PBBFAC,,, | Performed by: ORTHOPAEDIC SURGERY

## 2023-03-24 PROCEDURE — 20610 LARGE JOINT ASPIRATION/INJECTION: R KNEE: ICD-10-PCS | Mod: S$PBB,RT,, | Performed by: ORTHOPAEDIC SURGERY

## 2023-03-24 RX ORDER — METHYLPREDNISOLONE ACETATE 80 MG/ML
80 INJECTION, SUSPENSION INTRA-ARTICULAR; INTRALESIONAL; INTRAMUSCULAR; SOFT TISSUE
Status: DISCONTINUED | OUTPATIENT
Start: 2023-03-24 | End: 2023-03-24 | Stop reason: HOSPADM

## 2023-03-24 RX ADMIN — METHYLPREDNISOLONE ACETATE 80 MG: 80 INJECTION, SUSPENSION INTRALESIONAL; INTRAMUSCULAR; INTRASYNOVIAL; SOFT TISSUE at 10:03

## 2023-03-24 NOTE — PROGRESS NOTES
Subjective:     Patient ID: Leslie Wood is a 82 y.o. female.    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  She had a CT scan which I reviewed of her head that showed that she had a stroke and we do not think that she is a candidate for total knee replacement due to medical conditions.  I did tell him anesthesia might bring her level of function to 1 level lower than when she was preop and we should avoid it at this time.  She needs to recuperate completely from her stroke.  Her neurologist at told what ever she gets after 1 year in of therapy is there is what she is going to get.  She is going to physical therapy at the [place] and they want something about her knee if they can strengthen that.  She does have a brace for her back that she wears but not for her knee.  She uses a walker to get around.  She is here with her granddaughter and we discussed natural products.  She knows she cannot take NSAIDs because she is on Plavix and will injure her stomach and history of GERD the.  She needs to take natural with stuff.  In the future I did tell her steroid might be the only option for her in may end up being prednisone pills when time comes in the future   She denies loss of bowel bladder control she is alert today and oriented and answering questions appropriately  Granddaughter is here with her helping.    Past Medical History:   Diagnosis Date    Arthritis     Cataract     GERD (gastroesophageal reflux disease)     Heart attack 05/18/2022    Heart attack 05/23/2022    Hyperlipidemia     Hypertension     Stroke      Past Surgical History:   Procedure Laterality Date    ADENOIDECTOMY      ADRENAL GLAND SURGERY      APPENDECTOMY      BACK SURGERY      fusion l 4-5 s 1,2,3  fusion l 2-3    CORONARY  ANGIOGRAPHY N/A 5/20/2022    Procedure: ANGIOGRAM, CORONARY ARTERY;  Surgeon: Domingo Martins MD;  Location: [facility] CATH LAB;  Service: Cardiology;  Laterality: N/A;    CORONARY ANGIOGRAPHY N/A 5/24/2022    Procedure: ANGIOGRAM, CORONARY ARTERY;  Surgeon: Domingo Martins MD;  Location: [facility] CATH LAB;  Service: Cardiology;  Laterality: N/A;    EYE SURGERY      HEMORRHOID SURGERY      HERNIA REPAIR      HYSTERECTOMY      indirect lumbar decompression      percutaneous placement of interspinous extension blocker    LEFT HEART CATHETERIZATION Left 5/20/2022    Procedure: CATHETERIZATION, HEART, LEFT;  Surgeon: Domingo Martins MD;  Location: [facility] CATH LAB;  Service: Cardiology;  Laterality: Left;    TONSILLECTOMY       Family History   Problem Relation Age of Onset    Heart disease Mother     Hypertension Mother     Diabetes Mother     Hypertension Father     Kidney disease Father     Breast cancer Sister      Social History     Socioeconomic History    Marital status:    Tobacco Use    Smoking status: Never    Smokeless tobacco: Never   Substance and Sexual Activity    Alcohol use: No    Drug use: No    Sexual activity: Not Currently     Medication List with Changes/Refills   Current Medications    ASPIRIN (ECOTRIN) 81 MG EC TABLET    Take 1 tablet (81 mg total) by mouth once daily.    ATORVASTATIN (LIPITOR) 80 MG TABLET    Take 0.5 tablets (40 mg total) by mouth every morning.    CLOPIDOGREL (PLAVIX) 75 MG TABLET    Take 1 tablet (75 mg total) by mouth once daily. for 21 days    DULOXETINE (CYMBALTA) 30 MG CAPSULE    Take 30 mg by mouth once daily.    FUROSEMIDE (LASIX) 20 MG TABLET    Take 1 tablet (20 mg total) by mouth once daily.    LOSARTAN (COZAAR) 50 MG TABLET    Take 1 tablet (50 mg total) by mouth 2 (two) times a day.    METOPROLOL SUCCINATE (TOPROL-XL) 25 MG 24 HR TABLET    Take 1 tablet (25 mg total) by mouth once daily.    NIFEDIPINE (ADALAT CC) 30 MG TBSR    Take 1 tablet (30 mg total) by mouth  once daily.    NUCYNTA 50 MG TAB    Take 1 tablet (50 mg total) by mouth every 8 (eight) hours as needed (severe pain). RESTART WHEN OK PER PAIN MANAGEMENT PROVIDER    TIZANIDINE (ZANAFLEX) 4 MG TABLET         Review of patient's allergies indicates:   Allergen Reactions    Acetaminophen     Amitriptyline     Hydrochlorothiazide      Causes muscle cramping    Lisinopril      hyperkalemia    Oxycodone     Percocet [oxycodone-acetaminophen] Other (See Comments)     Seizures    Belbuca [buprenorphine hcl] Nausea And Vomiting and Other (See Comments)     Black out     Codeine Nausea Only and Rash    Prazosin Other (See Comments)     dizziness     Review of Systems   Constitutional: Negative for decreased appetite.   HENT:  Negative for tinnitus.    Eyes:  Negative for double vision.   Cardiovascular:  Negative for chest pain.   Respiratory:  Negative for wheezing.    Hematologic/Lymphatic: Negative for bleeding problem.   Skin:  Negative for dry skin.   Musculoskeletal:  Positive for arthritis, back pain, joint pain and muscle weakness. Negative for gout, neck pain and stiffness.   Gastrointestinal:  Negative for abdominal pain.   Genitourinary:  Negative for bladder incontinence.   Neurological:  Negative for numbness, paresthesias and sensory change.   Psychiatric/Behavioral:  Negative for altered mental status.      Objective:   Body mass index is 24.89 kg/m².  There were no vitals filed for this visit.       General    Constitutional: She is oriented to person, place, and time. She appears well-developed.   HENT:   Head: Atraumatic.   Eyes: EOM are normal.   Pulmonary/Chest: Effort normal.   Neurological: She is alert and oriented to person, place, and time.   Psychiatric: Judgment normal.         Ambulating well with a walker very steady  In the sitting position her pelvis is level  Bilateral hips passive motion without pain in the groin.    Hip flexors and abductors and adductors are slightly weak at 5-/5   Right  knee with mild to moderate swelling.  There is no warmness to touch.  There is crepitus with motion.  She has 5-120 degrees and range of motion.  No defect in the patella or quadriceps tendon.  There is weakness around 4/5 in the quads and hamstrings.  Pain is throughout the knee joint   Left knee mild degeneration and mild tenderness.  There is no swelling.  Good motion 0-125.  No defect in the patella or quadriceps tendon   Calves are soft bilaterally and nontender  There is multiple varicosities in the lower extremity  Skin over the knees within normal limits no obvious lesions    Relevant imaging results reviewed and interpreted by me, discussed with the patient and / or family today     X-ray 11/10/2022 of the hips within normal limits no evidence of arthritis  X-ray of the knees I 11/10/2022 on the right side showing there is a suprapatellar hematoma there is marginal osteophytic changes there is medial subluxation of the femur on the tibia consistent with severe arthritic change.  Assessment:     Encounter Diagnoses   Name Primary?    Arthritis of knee, right Yes    History of lumbar fusion         Plan:   Arthritis of knee, right  -     Large Joint Aspiration/Injection: R knee    History of lumbar fusion         Patient Instructions   X-ray show severe arthritis of her right knee with bone spurs growing   You had your knee aspirated in November and that was a bloody aspiration   You did have a stroke and I looked at your CT scan and I do not think at this time you should undergo any surgery for your knee  You going to physical therapy will get them to send them a referral to strengthen you legs  Will give you a brace for the right knee   You do have a brace for your back so you should wear it when you having back pain but not all the time  I injected her right knee with 80 mg of Depo-Medrol mixed with 5 cc 1% lidocaine 03/24/2023  The next visit in 3 months we need to give you the gel injections    I  reviewed her electronic records you had received Depo-Medrol on 09/20/2022 and Zilretta on 06/09/2022 and Synvisc 1 on 12/15/2021  I recommend that you stay away from a leave or Advil or any anti-inflammatories.  Sometimes prednisone is allowed in pill form if needed in the future  Other natural products including turmeric and chondroitin and glucosamine etcetera could help also I did give you a brand name of river of life vitamins by [employer] which has mostly all natural products        Disclaimer: This note was prepared using a voice recognition system and is likely to have sound alike errors within the text.

## 2023-03-24 NOTE — PATIENT INSTRUCTIONS
X-ray show severe arthritis of her right knee with bone spurs growing   You had your knee aspirated in November and that was a bloody aspiration   You did have a stroke and I looked at your CT scan and I do not think at this time you should undergo any surgery for your knee  You going to physical therapy will get them to send them a referral to strengthen you legs  Will give you a brace for the right knee   You do have a brace for your back so you should wear it when you having back pain but not all the time  I injected her right knee with 80 mg of Depo-Medrol mixed with 5 cc 1% lidocaine 03/24/2023  The next visit in 3 months we need to give you the gel injections    I reviewed her electronic records you had received Depo-Medrol on 09/20/2022 and Zilretta on 06/09/2022 and Synvisc 1 on 12/15/2021  I recommend that you stay away from a leave or Advil or any anti-inflammatories.  Sometimes prednisone is allowed in pill form if needed in the future  Other natural products including turmeric and chondroitin and glucosamine etcetera could help also I did give you a brand name of river of life vitamins by [employer] which has mostly all natural products

## 2023-03-24 NOTE — PROCEDURES
Large Joint Aspiration/Injection: R knee    Date/Time: 3/24/2023 10:40 AM  Performed by: Luis Ibarra MD  Authorized by: Luis Ibarra MD     Consent Done?:  Yes (Verbal)  Indications:  Arthritis  Site marked: the procedure site was marked    Timeout: prior to procedure the correct patient, procedure, and site was verified      Local anesthesia used?: Yes    Local anesthetic:  Lidocaine 1% without epinephrine    Details:  Needle Size:  22 G  Ultrasonic Guidance for needle placement?: No    Approach:  Anterolateral  Location:  Knee  Site:  R knee  Medications:  80 mg methylPREDNISolone acetate 80 mg/mL  Patient tolerance:  Patient tolerated the procedure well with no immediate complications

## 2023-03-27 ENCOUNTER — PATIENT MESSAGE (OUTPATIENT)
Dept: REHABILITATION | Facility: HOSPITAL | Age: 83
End: 2023-03-27
Payer: MEDICARE

## 2023-03-30 ENCOUNTER — CLINICAL SUPPORT (OUTPATIENT)
Dept: REHABILITATION | Facility: HOSPITAL | Age: 83
End: 2023-03-30
Payer: MEDICARE

## 2023-03-30 DIAGNOSIS — R41.841 COGNITIVE COMMUNICATION DISORDER: ICD-10-CM

## 2023-03-30 DIAGNOSIS — R47.01 APHASIA: Primary | ICD-10-CM

## 2023-03-30 DIAGNOSIS — Z74.09 IMPAIRED FUNCTIONAL MOBILITY, BALANCE, GAIT, AND ENDURANCE: Primary | ICD-10-CM

## 2023-03-30 PROCEDURE — 97130 THER IVNTJ EA ADDL 15 MIN: CPT

## 2023-03-30 PROCEDURE — 97112 NEUROMUSCULAR REEDUCATION: CPT

## 2023-03-30 PROCEDURE — 97110 THERAPEUTIC EXERCISES: CPT

## 2023-03-30 PROCEDURE — 97129 THER IVNTJ 1ST 15 MIN: CPT

## 2023-03-30 NOTE — PROGRESS NOTES
"[facility]  Speech Therapy Treatment Note- Neurological Rehabilitation    Date: 3/30/2023     Name: Leslie Wood   MRN: [id number]   Therapy Diagnosis:   Encounter Diagnoses   Name Primary?    Aphasia Yes    Cognitive communication disorder      Physician: Sammy Juarez MD  Physician Orders: [id number] - AMB REFERRAL/CONSULT TO SPEECH THERAPY  Medical Diagnosis: R47.01 (ICD-10-CM) - Aphasia    Visit #/ Visits Authorized: 7/10  Date of Evaluation:  2/17/2023   Insurance Authorization Period: 2/23/2023 - 12/31/2023   Plan of Care Expiration Date:    4/12/2023   Extended Plan of Care:  NA   Progress Note: 4/20/2023    Visits Cancelled: 0  Visits No Show: 0    Time In:  8:48 AM   Time Out: 9:30 AM   Total Billable Time: 42 minutes      Precautions: Standard  Subjective:   Patient reports: she feels as if things are "coming together."   She was compliant to home exercise program.   Response to previous treatment: Recalled some education provided at last session.   Pain Scale:  0/10 on a Visual Analog Scale currently.   Pain Location:  N/A  Objective:   TIMED  Procedure Min.   Cognitive Therapeutic Interventions, first 15 minutes CPT 49012  15   Cognitive Therapeutic Interventions, each additional 15 minutes CPT 77004  27         UNTIMED  Procedure Min.   Speech- Language- Voice Therapy  0     Total Timed Units: 3  Total Untimed Units: 0  Charges Billed/Number of units: 46079/1; 50686/2    Short Term Goals: (6 weeks) Current Progress:   Patient will describe functional objects/images to facilitate a strategy of description in order to increase transfer of intended message with 80% accuracy and moderate clinician cueing.     Progressing/ Not Met 3/30/2023   Not formally addressed.     Patient will complete word finding task (i.e. Creating subject, verb, object pairs in VNEST protocol) with 90% accuracy moderate assistance to improve word fluency.     Met x2 previously     Progressing/ Not Met 3/30/2023   Not " formally addressed.    Patient will utilize word finding strategies in structured tasks with 70% accuracy and minimal cues.      Progressing/ Not Met 3/30/2023   Not formally addressed.    Patient will summarize read material to improve word fluency, word finding, and reading comprehension in Attentive Reading and Constrained Summarization (ARCS) protocol with 80% accuracy and minimal verbal assistance across 2-3 sessions.     Progressing/ Not Met 3/30/2023   Not formally addressed.      Patient will write 7-10 word sentences with no more than 2 syntactic/grammatical errors given minimal cueing.      Progressing/ Not Met 3/30/2023   Not formally addressed.      Patient will complete functional writing task with complete, coherent, and accurately spelled responses with 80% accuracy and minimal verbal and visual assistance across 3-5 sessions.     Progressing/ Not Met 3/30/2023   Not formally addressed.      Patient will complete RBANS assessment to further assess cognitive communication skills.        GOAL MET 2/27/2023  Completed today. See below for results.       GOAL MET 2/27/2023    Patient will sustain attention to a moderate task (auditory or visual) for 2 minutes with 90% accuracy or no more than 1 redirection.     Visual: GOAL MET 3/20/2023  Auditory: GOAL MET 3/20/2023  VISUAL:   Task 1: Visual sustained attention task completed in which patient visually scanned an article looking for certain letters. She completed this task with 87% accuracy independently (36/41). Clinician declan a purple line on the left hand side to serve an anchor. Patient utilized a blank paper to keep track of which line she was working on.     Task 2: Visual sustained attention task completed in which patient visually scanned a phone bill looking for unexplained calls. She completed this task with 90% accuracy given minimal cueing. The one error that she made was due to skipping over an area code that was more offset to the left than  "the others. Clinician and patient dicussed left neglect in depth that is likely due to her first cerebral vascular accident (right cerebral vascular accident).     AUDITORY:  Task 1: Constant Therapy: Understanding Voicemails was completed today with 90% accuracy independently (improvement from last session with 70% accuracy). She required minimal assistance for navigating the application (pressing the next button, replaying the voicemail)    GOAL MET 3/20/2023    Patient will complete selective attention tasks (auditory or visual) with 90% accuracy independently increase selective attention.    Visual: Met x1  Auditory: Met x1 Visual:   Task 1: Patient completed a visual selective attention task of reading a map and answering questions regarding the map with natural background noise. She completed this task with 73% accuracy given initial cueing fading to independent. She rated this task 3/7 on the relative scale of cognitive load.     Task 2: Patient completed N-Back Level 1 (pictures) with "restaurant background noise" with 98% accuracy independently.     Auditory:  Task 1: Patient completed auditory selective attention task of following one step directions with "restaurant background noise." Constant Therapy: Following directions you hear Level 1. Completed with 100% accuracy independently.     Task 2: Patient completed auditory selective attention task of following two step directions with "restaurant background noise." Constant Therapy: Following directions you hear Level 2. Completed with 95% accuracy given moderate-maximum cueing. Significant improvement from last attempt.      Patient will use attention shifting strategies to shift attention between two tasks (auditory or visual) with no more than 3 cues or 90% accuracy to improve alternating attention. Not formally addressed.    Patient will complete short term recall tasks after a 5 minutes delay with 90% accuracy  independently  with use of memory " strategies to improve recall of information and generalization of memory strategies. Not formally addressed.    Patient will use Goal Plan Action Review strategy to complete moderate to complex reasoning, planning, or organization tasks with 90% accuracy independently to improve functional executive function skills. Not formally addressed.    Patient will complete a functional task to improve attention, memory and/or executive functions (I.e. sample bill paying activity, recipe, or multiple choice comprehension questions to 1 paragraphs) with 80% accuracy and natural environment noise distractions (TV news background, music, etc.). Not formally addressed.    Patient will be educated regarding cognitive deficits as applicable to the patient's life for increased awareness and will execute returned demonstration of information with 80% accuracy.           GOAL MET 3/6/2023  Education provided regarding results from her assessment, the hierarchy of neuropsychological functions, and the role of attention in other executive functions. Patient and granddaughter verbalized understanding.     GOAL MET 3/6/2023    Patient will independently generate strategies to improve ability to complete tasks with enhanced accuracy and time, based on review of objective previous performance on trials of functional tasks with 80% accuracy.  Not formally addressed.    Given implementation of strategies, patient will complete a task following initial attempt with 90% accuracy and reported reduced number on the relative scale of cognitive load when compared to previous trial.  Not formally addressed.    Patient will use external memory strategies in order to recall important dates, events, appointments, or tasks to be completed with 90% accuracy independently.  Not formally addressed.        Patient Education/Response:   Patient and her granddaughter were educated on the cognitive-communication strategies and word finding strategies. They  verbalized understanding of all discussed.     Home program established: yes-Patient provided word finding strategy handout  Exercises were reviewed and Leslie was able to demonstrate them prior to the end of the session.  Leslie demonstrated good  understanding of the education provided.     See Electronic Medical Record under Patient Instructions for exercises provided throughout therapy.  Assessment:   Leslie is progressing well towards her goals. She is motivated to improve and participates in all therapy tasks. Selective attention goal targeted today. Improvements in accuracy of auditory selective attention tasks today. She will benefit from continued speech therapy to address the above deficits and to utilize strategies as needed. Current goals remain appropriate. Goals will be updated as needed.     Patient prognosis is Good. Patient will continue to benefit from skilled outpatient speech and language therapy to address the deficits listed in the problem list on initial evaluation, provide patient/family education and to maximize patient's level of independence in the home and community environment.   Medical necessity is demonstrated by the following IMPAIRMENTS:  Language deficits impact overall quality of life, ability to participation in social and community interactions, and the ability to communicate important medical information if needed.   Cognitive-communication deficits impact overall quality of life and the ability to safely and independently complete activities of daily living.   Barriers to Therapy: none   Patient's spiritual, cultural and educational needs considered and patient agreeable to plan of care and goals.  Plan:   Continue Plan of Care with focus on attention, executive functions, language.     Becca Blanco, CCC-SLP, CBIS   Speech Language Pathologist   Certified Brain Injury Specialist   3/30/2023

## 2023-03-30 NOTE — PLAN OF CARE
[facility]   Physical Therapy Daily Note + Progress Note     Date: 3/30/2023   Name: Leslie CASTELLON [facility] Number: [id number]    Therapy Diagnosis:   Encounter Diagnosis   Name Primary?    Impaired functional mobility, balance, gait, and endurance Yes       Physician: Sepideh Peters,*    Visit Date: 3/30/2023    Physician Orders: PT Eval and Treat   Medical Diagnosis from Referral: R29.6 (ICD-10-CM) - Falls frequently  Evaluation Date: 2/27/2023  Authorization Period Expiration: 2/17/24  Plan of Care Expiration: 4/28/23  Progress Note Due: 4/30/23  Visit # / Visits authorized: 5/12 (+eval)  FOTO: 0/3 not appropriate due to cognitive deficits     Precautions: Standard and Fall     PTA Visit #: 0/5     Time In: 0935  Time Out: 1015  Total Billable Time: 40 minutes    SUBJECTIVE     Pt reports: That she is ok.  She presented to clinic with her rollator today.  She reports having a corset on today that is giving her support at her low back.   She reports that she was not compliant with Home Exercise Program as she left it in her granddaughter's car.   Response to previous treatment: She responded well to last session.   Functional change: She reports no functional change since last session.     Pain: 0/10  Location: low back     OBJECTIVE     Objective Measures updated at progress report unless specified.   5 x Sit to Stand = 14.14 seconds  TUG = 13.98 seconds  Treatment     Leslie received the treatments listed below:      THERAPEUTIC EXERCISES to develop strength, endurance, ROM, flexibility, posture, and core stabilization for (25) minutes including:    Intervention Performed Today    Supine Stretches  - 3x30 seconds bilateral for each:  - lower trunk rotation   - single knee to chest  - hamstring/ heelcord   Seated Stretches  - 3x30 seconds bilateral for each:  - hamstring/ heelcord  - hip adductor   Standing Stretches  - 3x15 seconds bilateral for each:  - IT band   - heelcord     Seated lower extremity exercises          - hip flexion 2x10 bilateral   - hamstring curl 2x10 bilateral   - dorsiflexion/ plantarflexion 2x10 bilateral with green band  - eversion 1x10 bilateral   - toe curls and flares 1x20 bilateral   - hip adduction against ball 1x10 with 3 second hold  - hip abduction against belt 1x10 with 3 second hold   Standing lower extremity exercises with 2# bilateral  X  X  X  X - hip flexion 2x10 bilateral   - hip abduction 2x10 bilateral   - hip extension 2x10 bilateral   - heel raise 2x10    NuStep for endurance x - Level 2-3, 8 minutes   Supine  - Quad set with short arc quad 2x10 with 3 second hold   - dorsiflexion against green band 2x10 Right    Shuttle X  - bilateral leg press with 5 bands of resistance 3x10    Walking with rollator for endurance x - 2x's across gym     neuromuscular re-education activities to improve: Balance, Coordination, Kinesthetic, Sense, Proprioception, and Posture for (15) minutes. The following activities were included:    Intervention Performed Today    Air Ex Beam        - Tandem holds 5 seconds, 5x's bilateral   - Tandem walking forward/backward 2x's each  - Side stepping 2x's each direction  - tandem walking over hurdles forward and side stepping 2x's each  - heel lifts and toe lifts off of beam with 2-0 hand supports   - single leg standing while tapping opposite foot forward and back on beam   Orange Rubber Beam    - Tandem holds 3 seconds, 3x's bilateral   - Tandem walking forward 4x's   - Side stepping 2x's each direction  - single leg standing while tapping opposite foot forward and back on beam   Air Ex Pad              - Weight shifts Left to Right 2x10  - Weight shifts forward/back 2x10  - single leg standing holds   - Eyes closed 10 seconds  - backward pivot steps with 1 hand support 1x8 bilateral   - standing on pad while toss/catching ball to wall 1x8  - One lower extremity on pad and opposite lower extremity on step tossing ball to  wall    Hand paddled per Neuro LEONIE technique and for entirety of session     Core Exercises in supine  Bilateral lower extremities over Tball for each:   - lower trunk rotation 2x10  - bilateral knees to chest 2x10  - posterior pelvic tilt to bridge 2x10   Parallel bars      - walking backward 4x8 ft with 2-1 hand support  - braided walking 4x's length of bars  - narrow base standing without hand support performing head turns/nods 1x5 each, arm reach/lifts 1x5 each   Agility Drills on Turf X  X  X  X - braided walking 4x12 ft   - side stepping 2x20 ft  - backward walking 2x20 ft  - tandem walking 4x20 ft   Trunk extension for posture  - 2x10 over pillow and ball at low back    Sit to squat without hand support  2x5 from progressively lowered mat   Neoprene wrap to Right knee for all exercises    THERAPEUTIC ACTIVITIES to improve dynamic and functional performance for () minutes including:    Intervention Performed Today    Patient and her granddaughter educated on Home Exercise Program for balance.  Also educated on wearing tennis shoes to future sessions.  Discussed the process of changing schedule to get her seen by an ortho PT 1 day/week then by me 1day/week  - Patient and granddaughter verbalized good understanding of education provided.                                              MANUAL THERAPY TECHNIQUES were applied for () minutes, including:    Manual Intervention Performed Today    Soft Tissue Mobilization []    Joint Mobilizations [] Right patella superior and inferior glides.     []     []    Functional Dry Needling  []      Plan for Next Visit: Continue as needed         Gait Training: to improve functional mobility and safety for  () minutes, including:  - working on increasing step length and upright posture with gait sequence, ambulating 200ft and 100ft with contact guard assist for safety awareness.     Patient Education and Home Exercises     Home Exercises Provided and Patient Education Provided      Education provided:   - 3/14/23 Patient educated on Home Exercise Program for balance   - 3/16/23 Patient encouraged to stay compliant with Home Exercise Program     Written Home Exercises Provided: yes. Exercises were reviewed and Leslie was able to demonstrate them prior to the end of the session.  Leslie verbalized good  understanding of the education provided. See EMR under Patient Instructions for exercises provided during therapy sessions    ASSESSMENT     Patient responded well to session today with good participation.  She was able to perform several balance activities with only 1 hand support, but continues to be limited by cognitive deficits for carryover of strategies and gains.  She is also limited by intermittent Right knee pain, needing neoprene wrap for support during stand activities. She has good potential to make gains with continued PT and compliance of Home Exercise Program.     Leslie Is progressing well towards her goals.   Pt prognosis is Good.     Pt will continue to benefit from skilled outpatient physical therapy to address the deficits listed in the problem list box on initial evaluation, provide pt/family education and to maximize pt's level of independence in the home and community environment.     Pt's spiritual, cultural and educational needs considered and pt agreeable to plan of care and goals.     Anticipated barriers to physical therapy: co-morbidities, sedentary lifestyle, chronicity of condition, and lack of understanding of condition     GOALS:     Short Term Goals:  4 weeks Progress    Function: Patient will demonstrate improved function as indicated by a functional limitation score improved by 3 points from initial measure.  PC   Strength: Patient will demonstrate improved strength by performing 5x sit to  20 seconds in order to progress towards independence with functional activities.  Met   Balance: Complete Encarnacion Balance Scale and set goals accordingly.  PC    Coordination: Patient will demonstrate improved time of 13 sec on TUG to demonstrate improved coordination and safety with mobility. Met   HEP: Patient will demonstrate independence with HEP in order to progress toward functional independence.  Met   Determine the need of Custom Wheelchair Eval to improve patient's positioning and independence with pressure reliefs and independence with home and community mobility to decrease burden of care. D/C not needed at this time.              Long Term Goals:  8 weeks Progress   Function: Patient will demonstrate improved function as indicated by a functional limitation score improved 5 points from initial measure.  PC   Strength: Patient will demonstrate improved strength by performing 5x sit to  17 seconds in order to progress towards independence with functional activities.  Met   Patient will return to ADL's, IADL's, community involvement, recreational activities, and work-related activities with less than or equal to 5/10 pain and maximal function.  PC   Balance: Complete Encarnacion Balance Scale and set goals accordingly.  PC   Coordination: Patient will demonstrate improved time of 11 sec on TUG to demonstrate improved coordination and safety with mobility. PC   HEP: Patient educated on HEP in preparation for d/c verbalizing and demonstrating good understanding of topics discussed.   PC            Goals Key:  PC= progressing/continue;        PM= partially met;             DC= discontinue    PLAN     Continue Plan of Care (POC) and progress per patient tolerance. See Treatment section for exercise progression.    Eugenie Stone, PT

## 2023-03-31 ENCOUNTER — EXTERNAL CHRONIC CARE MANAGEMENT (OUTPATIENT)
Dept: PRIMARY CARE CLINIC | Facility: CLINIC | Age: 83
End: 2023-03-31
Payer: MEDICARE

## 2023-03-31 PROCEDURE — 99439 PR CHRONIC CARE MGMT, EA ADDTL 20 MIN: ICD-10-PCS | Mod: S$PBB,,, | Performed by: FAMILY MEDICINE

## 2023-03-31 PROCEDURE — 99490 CHRNC CARE MGMT STAFF 1ST 20: CPT | Mod: S$PBB,,, | Performed by: FAMILY MEDICINE

## 2023-03-31 PROCEDURE — 99490 PR CHRONIC CARE MGMT, 1ST 20 MIN: ICD-10-PCS | Mod: S$PBB,,, | Performed by: FAMILY MEDICINE

## 2023-03-31 PROCEDURE — 99439 CHRNC CARE MGMT STAF EA ADDL: CPT | Mod: PBBFAC | Performed by: FAMILY MEDICINE

## 2023-03-31 PROCEDURE — 99439 CHRNC CARE MGMT STAF EA ADDL: CPT | Mod: S$PBB,,, | Performed by: FAMILY MEDICINE

## 2023-03-31 PROCEDURE — 99490 CHRNC CARE MGMT STAFF 1ST 20: CPT | Mod: PBBFAC | Performed by: FAMILY MEDICINE

## 2023-04-03 ENCOUNTER — OFFICE VISIT (OUTPATIENT)
Dept: NEUROLOGY | Facility: CLINIC | Age: 83
End: 2023-04-03
Payer: MEDICARE

## 2023-04-03 ENCOUNTER — CLINICAL SUPPORT (OUTPATIENT)
Dept: REHABILITATION | Facility: HOSPITAL | Age: 83
End: 2023-04-03
Payer: MEDICARE

## 2023-04-03 VITALS
HEART RATE: 76 BPM | BODY MASS INDEX: 24.55 KG/M2 | RESPIRATION RATE: 16 BRPM | SYSTOLIC BLOOD PRESSURE: 104 MMHG | HEIGHT: 61 IN | DIASTOLIC BLOOD PRESSURE: 63 MMHG | WEIGHT: 130.06 LBS

## 2023-04-03 DIAGNOSIS — Z74.09 IMPAIRED FUNCTIONAL MOBILITY, BALANCE, GAIT, AND ENDURANCE: Primary | ICD-10-CM

## 2023-04-03 DIAGNOSIS — R47.01 APHASIA: Primary | ICD-10-CM

## 2023-04-03 DIAGNOSIS — G45.9 TIA (TRANSIENT ISCHEMIC ATTACK): Primary | ICD-10-CM

## 2023-04-03 DIAGNOSIS — I10 ESSENTIAL HYPERTENSION: ICD-10-CM

## 2023-04-03 DIAGNOSIS — E78.2 MIXED HYPERLIPIDEMIA: ICD-10-CM

## 2023-04-03 DIAGNOSIS — R41.841 COGNITIVE COMMUNICATION DISORDER: ICD-10-CM

## 2023-04-03 PROCEDURE — 99999 PR PBB SHADOW E&M-EST. PATIENT-LVL IV: CPT | Mod: PBBFAC,,, | Performed by: PSYCHIATRY & NEUROLOGY

## 2023-04-03 PROCEDURE — 99213 PR OFFICE/OUTPT VISIT, EST, LEVL III, 20-29 MIN: ICD-10-PCS | Mod: S$PBB,,, | Performed by: PSYCHIATRY & NEUROLOGY

## 2023-04-03 PROCEDURE — 97129 THER IVNTJ 1ST 15 MIN: CPT

## 2023-04-03 PROCEDURE — 97140 MANUAL THERAPY 1/> REGIONS: CPT

## 2023-04-03 PROCEDURE — 99213 OFFICE O/P EST LOW 20 MIN: CPT | Mod: S$PBB,,, | Performed by: PSYCHIATRY & NEUROLOGY

## 2023-04-03 PROCEDURE — 99214 OFFICE O/P EST MOD 30 MIN: CPT | Mod: PBBFAC | Performed by: PSYCHIATRY & NEUROLOGY

## 2023-04-03 PROCEDURE — 99999 PR PBB SHADOW E&M-EST. PATIENT-LVL IV: ICD-10-PCS | Mod: PBBFAC,,, | Performed by: PSYCHIATRY & NEUROLOGY

## 2023-04-03 PROCEDURE — 97110 THERAPEUTIC EXERCISES: CPT

## 2023-04-03 PROCEDURE — 97130 THER IVNTJ EA ADDL 15 MIN: CPT

## 2023-04-03 NOTE — PATIENT INSTRUCTIONS
-- Activity as tolerated  -- ASA 81 mg daily PO   -- Atorvastatin 40 mg daily   -- Starting PT/OT and speech   -- Event monitoring is negative. I am recommending placing loop recorder.Refer to cardiology    -- We had an in depth discussion with patient and family regarding the imaging findings, TIA etiology and our plan     -- We discussed her vascular risk factors and the need for her to continue to modify her risks e.g. taking her mediations, proper diet, and exercise.  -- Work with primary care provider on stroke risk factor management, BP <140/90, cholesterol monitoring  -- We discussed the need for her to continue to exercise her body and her mind with physical activity and mental activities.  -- We discussed BEFAST and the need for her to recognize stroke symptoms and report to the ER early is needed.    B-Balance  E-Eyes, vision  F-Facial Droop  A-Arm or leg weakness on one side  S-Speech trouble  T-Time - CALL 911!    -- Please have this patient follow up in my clinic on PRN basis.

## 2023-04-03 NOTE — PROGRESS NOTES
[facility]  Speech Therapy Treatment Note- Neurological Rehabilitation    Date: 4/3/2023     Name: Leslie Wood   MRN: [id number]   Therapy Diagnosis:   Encounter Diagnoses   Name Primary?    Aphasia Yes    Cognitive communication disorder      Physician: Sammy Juarez MD  Physician Orders: [id number] - AMB REFERRAL/CONSULT TO SPEECH THERAPY  Medical Diagnosis: R47.01 (ICD-10-CM) - Aphasia    Visit #/ Visits Authorized: 8/10  Date of Evaluation:  2/17/2023   Insurance Authorization Period: 2/23/2023 - 12/31/2023   Plan of Care Expiration Date:  4/12/2023   Extended Plan of Care:  NA   Progress Note: 4/20/2023    Visits Cancelled: 0  Visits No Show: 0    Time In:  9:30 AM   Time Out: 10:15 AM   Total Billable Time: 45 minutes      Precautions: Standard  Subjective:   Patient reports: no significant changes since last session. She has an appointment with her Neurologist today.   She was compliant to home exercise program.   Response to previous treatment: Continued progress towards visual selective attention.   Pain Scale:  0/10 on a Visual Analog Scale currently.   Pain Location:  N/A  Objective:   TIMED  Procedure Min.   Cognitive Therapeutic Interventions, first 15 minutes CPT 56883  15   Cognitive Therapeutic Interventions, each additional 15 minutes CPT 41236  30         UNTIMED  Procedure Min.   Speech- Language- Voice Therapy  0     Total Timed Units: 3  Total Untimed Units: 0  Charges Billed/Number of units: 69848/1; 70661/2    Short Term Goals: (6 weeks) Current Progress:   Patient will describe functional objects/images to facilitate a strategy of description in order to increase transfer of intended message with 80% accuracy and moderate clinician cueing.     Progressing/ Not Met 4/3/2023   Not formally addressed.     Patient will complete word finding task (i.e. Creating subject, verb, object pairs in VNEST protocol) with 90% accuracy moderate assistance to improve word fluency.     Met x2  "previously     Progressing/ Not Met 4/3/2023   Patient completed VNEST task X3 (open, eat, write). She generated subjects with 80% accuracy independently and objects with 80% accuracy independently. She requires cueing to generate new subjects/objects, and to use a "who" such as a name or pronoun instead of a directive "please write..."    Patient will utilize word finding strategies in structured tasks with 70% accuracy and minimal cues.      Progressing/ Not Met 4/3/2023   Not formally addressed.    Patient will summarize read material to improve word fluency, word finding, and reading comprehension in Attentive Reading and Constrained Summarization (ARCS) protocol with 80% accuracy and minimal verbal assistance across 2-3 sessions.     Progressing/ Not Met 4/3/2023   Not formally addressed.      Patient will write 7-10 word sentences with no more than 2 syntactic/grammatical errors given minimal cueing.      Progressing/ Not Met 4/3/2023   Not formally addressed.      Patient will complete functional writing task with complete, coherent, and accurately spelled responses with 80% accuracy and minimal verbal and visual assistance across 3-5 sessions.     Progressing/ Not Met 4/3/2023   Not formally addressed.      Patient will complete RBANS assessment to further assess cognitive communication skills.        GOAL MET 2/27/2023  Completed today. See below for results.       GOAL MET 2/27/2023    Patient will sustain attention to a moderate task (auditory or visual) for 2 minutes with 90% accuracy or no more than 1 redirection.     Visual: GOAL MET 3/20/2023  Auditory: GOAL MET 3/20/2023  VISUAL:   Task 1: Visual sustained attention task completed in which patient visually scanned an article looking for certain letters. She completed this task with 87% accuracy independently (36/41). Clinician declan a purple line on the left hand side to serve an anchor. Patient utilized a blank paper to keep track of which line she " "was working on.     Task 2: Visual sustained attention task completed in which patient visually scanned a phone bill looking for unexplained calls. She completed this task with 90% accuracy given minimal cueing. The one error that she made was due to skipping over an area code that was more offset to the left than the others. Clinician and patient dicussed left neglect in depth that is likely due to her first cerebral vascular accident (right cerebral vascular accident).     AUDITORY:  Task 1: Constant Therapy: Understanding Voicemails was completed today with 90% accuracy independently (improvement from last session with 70% accuracy). She required minimal assistance for navigating the application (pressing the next button, replaying the voicemail)    GOAL MET 3/20/2023    Patient will complete selective attention tasks (auditory or visual) with 90% accuracy independently increase selective attention.    Visual: Met x2  Auditory: Met x1 Visual:   Task 1: Patient completed a visual selective attention task of scanning a page of numbers with "restaurant ambiance" background noise. She completed this task with 100% accuracy independently. She utilized a blank paper to keep track of each line.     Auditory:  Task 1: Patient completed auditory selective attention task of listening to stories and answering questions (understanding stories you hear Level 3). She completed this task with 70% accuracy independently.      Clinician and patient discussed strategies for improved accuracy such as replaying the story when needed, reading the questions carefully, and asking for clarification if needed. She then completed this task a second time with 80% accuracy using strategies.      Patient will use attention shifting strategies to shift attention between two tasks (auditory or visual) with no more than 3 cues or 90% accuracy to improve alternating attention. Not formally addressed.    Patient will complete short term recall " tasks after a 5 minutes delay with 90% accuracy  independently  with use of memory strategies to improve recall of information and generalization of memory strategies. Not formally addressed.    Patient will use Goal Plan Action Review strategy to complete moderate to complex reasoning, planning, or organization tasks with 90% accuracy independently to improve functional executive function skills. Not formally addressed.    Patient will complete a functional task to improve attention, memory and/or executive functions (I.e. sample bill paying activity, recipe, or multiple choice comprehension questions to 1 paragraphs) with 80% accuracy and natural environment noise distractions (TV news background, music, etc.). Not formally addressed.    Patient will be educated regarding cognitive deficits as applicable to the patient's life for increased awareness and will execute returned demonstration of information with 80% accuracy.           GOAL MET 3/6/2023  Education provided regarding results from her assessment, the hierarchy of neuropsychological functions, and the role of attention in other executive functions. Patient and granddaughter verbalized understanding.     GOAL MET 3/6/2023    Patient will independently generate strategies to improve ability to complete tasks with enhanced accuracy and time, based on review of objective previous performance on trials of functional tasks with 80% accuracy.  Not formally addressed.    Given implementation of strategies, patient will complete a task following initial attempt with 90% accuracy and reported reduced number on the relative scale of cognitive load when compared to previous trial.  Not formally addressed.    Patient will use external memory strategies in order to recall important dates, events, appointments, or tasks to be completed with 90% accuracy independently.  Not formally addressed.        Patient Education/Response:   Patient and her granddaughter were  educated on the cognitive-communication strategies and word finding strategies. They verbalized understanding of all discussed.     Home program established: yes-Patient provided word finding strategy handout  Exercises were reviewed and Leslie was able to demonstrate them prior to the end of the session.  Leslie demonstrated good  understanding of the education provided.     See Electronic Medical Record under Patient Instructions for exercises provided throughout therapy.  Assessment:   Leslie is progressing well towards her goals. She is motivated to improve and participates in all therapy tasks. Continued progress towards visual selective attention goal. She will benefit from continued speech therapy to address the above deficits and to utilize strategies as needed. Current goals remain appropriate. Goals will be updated as needed.     Patient prognosis is Good. Patient will continue to benefit from skilled outpatient speech and language therapy to address the deficits listed in the problem list on initial evaluation, provide patient/family education and to maximize patient's level of independence in the home and community environment.   Medical necessity is demonstrated by the following IMPAIRMENTS:  Language deficits impact overall quality of life, ability to participation in social and community interactions, and the ability to communicate important medical information if needed.   Cognitive-communication deficits impact overall quality of life and the ability to safely and independently complete activities of daily living.   Barriers to Therapy: none   Patient's spiritual, cultural and educational needs considered and patient agreeable to plan of care and goals.  Plan:   Continue Plan of Care with focus on attention, executive functions, language.     Becca Blanco, CCC-SLP, CBIS   Speech Language Pathologist   Certified Brain Injury Specialist   4/3/2023

## 2023-04-03 NOTE — PROGRESS NOTES
[facility]   Physical Therapy Treatment Note     Name: Leslie CASTELLON [facility] Number: [id number]    Therapy Diagnosis:   Encounter Diagnosis   Name Primary?    Impaired functional mobility, balance, gait, and endurance Yes     Physician: Sepideh Peters,*    Visit Date: 4/3/2023    Physician Orders: PT Eval and Treat   Medical Diagnosis from Referral: R29.6 (ICD-10-CM) - Falls frequently  Evaluation Date: 2/27/2023  Authorization Period Expiration: 2/17/24  Plan of Care Expiration: 4/28/23  Progress Note Due: 3/27/23  Visit # / Visits authorized: 6/12 (+eval)  FOTO: 0/3 time constraints     Precautions: Standard and Fall     PTA Visit #: 0/5     Time In: 0830  Time Out: 0930  Total Billable Time: 60 minutes    SUBJECTIVE     Pt reports: that she experienced significant pain over the weekend at the lower back region.  She does report of difficulty participating in activities secondary to pain.  She reports that she was not compliant with Home Exercise Program as she left it in her granddaughter's car.   Response to previous treatment: She responded well to last session.   Functional change: She reports no functional change since last session.     Pain: 0/10  Location: low back     OBJECTIVE     Objective Measures updated at progress report unless specified.     Treatment     Leslie received the treatments listed below:      THERAPEUTIC EXERCISES to develop strength, endurance, ROM, flexibility, posture, and core stabilization for (30) minutes including:    Intervention Performed Today    Supine Stretches  - 3x30 seconds bilateral for each:  - lower trunk rotation   - single knee to chest  - hamstring/ heelcord   Seated Stretches  - 3x30 seconds bilateral for each:  - hamstring/ heelcord  - hip adductor   Standing Stretches  - 3x15 seconds bilateral for each:  - IT band   - heelcord    Seated lower extremity exercises          - hip flexion 2x10 bilateral   - hamstring curl 2x10  bilateral   - dorsiflexion/ plantarflexion 2x10 bilateral with green band  - eversion 1x10 bilateral   - toe curls and flares 1x20 bilateral   - hip adduction against ball 1x10 with 3 second hold  - hip abduction against belt 1x10 with 3 second hold   Standing lower extremity exercises   - hip flexion 2x10 bilateral   - hip abduction 2x10 bilateral   - hip extension 2x10 bilateral   - heel raise 2x10    NuStep for endurance x - Level 1-2, 7 minutes   Supine X   - Quad set with short arc quad 2x10 with 3 second hold   - dorsiflexion against green band 2x10 Right    Lower Trunk Rotations x 10 second holds x 2 minutes   Pelvic tilts x 10 second holds x 2 minutes   Abdominal Bracing x Knee to chest - 2x15     neuromuscular re-education activities to improve: Balance, Coordination, Kinesthetic, Sense, Proprioception, and Posture for (5) minutes. The following activities were included:    Intervention Performed Today    Air Ex Beam        - Tandem holds 5 seconds, 5x's bilateral   - Tandem walking forward/backward 2x's each  - Side stepping 2x's each direction  - tandem walking over hurdles forward and side stepping 2x's each  - heel lifts and toe lifts off of beam with 2-0 hand supports   - single leg standing while tapping opposite foot forward and back on beam   Orange Rubber Beam    - Tandem holds 3 seconds, 3x's bilateral   - Tandem walking forward 4x's   - Side stepping 2x's each direction  - single leg standing while tapping opposite foot forward and back on beam   Air Ex Pad              - Weight shifts Left to Right 2x10  - Weight shifts forward/back 2x10  - single leg standing holds   - Eyes closed 10 seconds  - backward pivot steps with 1 hand support 1x8 bilateral   - standing on pad while toss/catching ball to wall 1x8  - One lower extremity on pad and opposite lower extremity on step tossing ball to wall    Hand paddled per Neuro LEONIE technique and for entirety of session     Core Exercises in supine   Bilateral lower extremities over Tball for each:   - lower trunk rotation 2x10  - bilateral knees to chest 2x10  - posterior pelvic tilt to bridge 2x10   Parallel bars      - walking backward 4x8 ft with 2-1 hand support  - braided walking 4x's length of bars  - narrow base standing without hand support performing head turns/nods 1x5 each, arm reach/lifts 1x5 each   Trunk extension for posture  - 2x10 over pillow and ball at low back    Sit to squat without hand support x 2x5 from progressively lowered mat   Neoprene wrap to Right knee for all exercises    THERAPEUTIC ACTIVITIES to improve dynamic and functional performance for () minutes including:    Intervention Performed Today    Patient and her granddaughter educated on Home Exercise Program for balance.  Also educated on wearing tennis shoes to future sessions.  Discussed the process of changing schedule to get her seen by an ortho PT 1 day/week then by me 1day/week  - Patient and granddaughter verbalized good understanding of education provided.                                              MANUAL THERAPY TECHNIQUES were applied for (30) minutes, including:    Manual Intervention Performed Today    Soft Tissue Mobilization [x] Left lumbar paraspinal   Joint Mobilizations []     []     []    Functional Dry Needling  [x] Left lumbosacral multifidus and paraspinal     Plan for Next Visit: Continue as needed         Gait Training: to improve functional mobility and safety for  (5) minutes, including:  - working on increasing step length and upright posture with gait sequence, ambulating 200ft and 100ft with contact guard assist for safety awareness.     Patient Education and Home Exercises     Home Exercises Provided and Patient Education Provided     Education provided:   - 3/14/23 Patient educated on Home Exercise Program for balance   - 3/16/23 Patient encouraged to stay compliant with Home Exercise Program   Written Home Exercises Provided: yes. Exercises were  reviewed and Leslie was able to demonstrate them prior to the end of the session.  Leslie verbalized good  understanding of the education provided. See EMR under Patient Instructions for exercises provided during therapy sessions    ASSESSMENT     Patient reports that she has experienced significant pain at the left lumbosacral pain over the weekend.  Multiple trigger points palpated the left lumbosacral region.  Therefore, FDN performed into the region.  Also worked on multiple stretching and easy strengthening/stabilization exercises.  Will monitor to determine effects of FDN and light intensity exercise on the pain.    Leslie Is progressing well towards her goals.   Pt prognosis is Good.     Pt will continue to benefit from skilled outpatient physical therapy to address the deficits listed in the problem list box on initial evaluation, provide pt/family education and to maximize pt's level of independence in the home and community environment.     Pt's spiritual, cultural and educational needs considered and pt agreeable to plan of care and goals.     Anticipated barriers to physical therapy: co-morbidities, sedentary lifestyle, chronicity of condition, and lack of understanding of condition     GOALS:     Short Term Goals:  4 weeks Progress    Function: Patient will demonstrate improved function as indicated by a functional limitation score improved by 3 points from initial measure.  PC   Strength: Patient will demonstrate improved strength by performing 5x sit to  20 seconds in order to progress towards independence with functional activities.  PC   Balance: Complete Encarn[place] Balance Scale and set goals accordingly.  PC   Coordination: Patient will demonstrate improved time of 13 sec on TUG to demonstrate improved coordination and safety with mobility. PC   HEP: Patient will demonstrate independence with HEP in order to progress toward functional independence.  Met   Determine the need of Custom Wheelchair Eval  to improve patient's positioning and independence with pressure reliefs and independence with home and community mobility to decrease burden of care. D/C not needed at this time.              Long Term Goals:  8 weeks Progress   Function: Patient will demonstrate improved function as indicated by a functional limitation score improved 5 points from initial measure.  PC   Strength: Patient will demonstrate improved strength by performing 5x sit to  17 seconds in order to progress towards independence with functional activities.  PC   Patient will return to ADL's, IADL's, community involvement, recreational activities, and work-related activities with less than or equal to 5/10 pain and maximal function.  PC   Balance: Complete Encarn[place] Balance Scale and set goals accordingly.  PC   Coordination: Patient will demonstrate improved time of 11 sec on TUG to demonstrate improved coordination and safety with mobility. PC   HEP: Patient educated on HEP in preparation for d/c verbalizing and demonstrating good understanding of topics discussed.   PC            Goals Key:  PC= progressing/continue;        PM= partially met;             DC= discontinue    PLAN     Continue Plan of Care (POC) and progress per patient tolerance. See Treatment section for exercise progression.    Leoncio Lujan, PT

## 2023-04-03 NOTE — PROGRESS NOTES
"  Patient Name: Leslie Wood  MRN: [id number]    Chief Complaint: Follow-up post hospitalization admission for TIA      Service used: No    Interval History: (4/03/2023)  All symptoms resolved. No new symptoms. Negative event monitoring so far.     Interval History: (3/13/2023)  Patient and family reported resolution of TIA symptoms that she was admitted with recently. She reported one event on Thursday where she felt that her heart is racing for few seconds. No other associated symptoms reported. Per family back to baseline.           Discharge summary.    Ms. Wood is a 82 year old female with a history of GERD, CAD, MI, hypertension, cardiomyopathy and CVA in 2020 who presented to the ED for eval of worsening confusion, aphasia that family noticed around 0530 this a (last known well time 1/31 approx 2100).  Per daughter at bedside, yesterday patient was in her normal state of health, went to her cardiology appointment and last night patient was noted to be a little confused, but family thought that was because she had a long day.  This am, when daughter went into patients room she found her mother sitting up with a "blank stare on her face", speech was slurred and she noticed she had urinated on herself.  Per [facility] who arrived around 0900, pt was able to talk, but she was still confused and had an abnormal finger to nose test on the R. Lab work in the ED unremarkable, UA with no signs of infection, chest xary with no signs of infection.  CT with chronic microvascular changes but no acute intracranial processes. Patient complains of a headache, she denies any N/V/D, fever/chills, SOB.  She will be admitted to observation for further work-up of AMS/TIA.  MRI/MRA unable to be done d/t nerve stimulator that is not compatible with MRI. Neurology consulted.      Past Medical History:   Diagnosis Date    Arthritis     Cataract     GERD (gastroesophageal reflux disease)     Heart attack 05/18/2022    Heart " attack 05/23/2022    Hyperlipidemia     Hypertension     Stroke          Past Surgical History:   Procedure Laterality Date    ADENOIDECTOMY      ADRENAL GLAND SURGERY      APPENDECTOMY      BACK SURGERY      fusion l 4-5 s 1,2,3  fusion l 2-3    CORONARY ANGIOGRAPHY N/A 5/20/2022    Procedure: ANGIOGRAM, CORONARY ARTERY;  Surgeon: Domingo Martins MD;  Location: [facility] CATH LAB;  Service: Cardiology;  Laterality: N/A;    CORONARY ANGIOGRAPHY N/A 5/24/2022    Procedure: ANGIOGRAM, CORONARY ARTERY;  Surgeon: Domingo Martins MD;  Location: [facility] CATH LAB;  Service: Cardiology;  Laterality: N/A;    EYE SURGERY      HEMORRHOID SURGERY      HERNIA REPAIR      HYSTERECTOMY      indirect lumbar decompression      percutaneous placement of interspinous extension blocker    LEFT HEART CATHETERIZATION Left 5/20/2022    Procedure: CATHETERIZATION, HEART, LEFT;  Surgeon: Domingo Martins MD;  Location: [facility] CATH LAB;  Service: Cardiology;  Laterality: Left;    TONSILLECTOMY       Review of patient's allergies indicates:   Allergen Reactions    Acetaminophen     Amitriptyline     Hydrochlorothiazide      Causes muscle cramping    Lisinopril      hyperkalemia    Oxycodone     Percocet [oxycodone-acetaminophen] Other (See Comments)     Seizures    Belbuca [buprenorphine hcl] Nausea And Vomiting and Other (See Comments)     Black out     Codeine Nausea Only and Rash    Prazosin Other (See Comments)     dizziness       Current Outpatient Medications:     aspirin (ECOTRIN) 81 MG EC tablet, Take 1 tablet (81 mg total) by mouth once daily., Disp: 90 tablet, Rfl: 3    atorvastatin (LIPITOR) 80 MG tablet, Take 0.5 tablets (40 mg total) by mouth every morning., Disp: 30 tablet, Rfl: 5    clopidogreL (PLAVIX) 75 mg tablet, Take 1 tablet (75 mg total) by mouth once daily. for 21 days, Disp: 21 tablet, Rfl: 0    DULoxetine (CYMBALTA) 30 MG capsule, Take 30 mg by mouth once daily., Disp: , Rfl:     furosemide (LASIX) 20 MG tablet, Take 1  tablet (20 mg total) by mouth once daily., Disp: 30 tablet, Rfl: 11    losartan (COZAAR) 50 MG tablet, Take 1 tablet (50 mg total) by mouth 2 (two) times a day., Disp: 180 tablet, Rfl: 1    metoprolol succinate (TOPROL-XL) 25 MG 24 hr tablet, Take 1 tablet (25 mg total) by mouth once daily., Disp: 90 tablet, Rfl: 3    NIFEdipine (ADALAT CC) 30 MG TbSR, Take 1 tablet (30 mg total) by mouth once daily., Disp: 30 tablet, Rfl: 0    NUCYNTA 50 mg Tab, Take 1 tablet (50 mg total) by mouth every 8 (eight) hours as needed (severe pain). RESTART WHEN OK PER PAIN MANAGEMENT PROVIDER, Disp: 1 tablet, Rfl: 0    tiZANidine (ZANAFLEX) 4 MG tablet, , Disp: , Rfl:     Social History     Socioeconomic History    Marital status:    Tobacco Use    Smoking status: Never    Smokeless tobacco: Never   Substance and Sexual Activity    Alcohol use: No    Drug use: No    Sexual activity: Not Currently     Family History   Problem Relation Age of Onset    Heart disease Mother     Hypertension Mother     Diabetes Mother     Hypertension Father     Kidney disease Father     Breast cancer Sister      Review of Systems  Constitutional: no fevers, no weight changes,  No diaphoresis  HEENT: No congestion, no doublevision, no dysphagia, no rhinnorhea, no lacrimation  Cardiovascular: no chest pain or palpitations  Respiratory: no shortness of breath, no cough  Gastrointestinal: no diarrhea, no constipation  Genitourinary: no dysuria  Musculoskeletal: no joint swelling. No myalgia  Skin: no rash  Psychiatric: no hallucinations, no depression or anxiety  Neurologic: as per HPI  All other review of systems is negative and as per HPI.    Vitals:    04/03/23 1053   BP: 104/63   Pulse: 76   Resp: 16      Exam   General: Pleasant, conversant, Well-kempt  HEENT: Head atraumatic. No nasal abnormality. No gaze preference. EOMI.  Cardiovascular: S1S2 present. RRR. No murmurs. No carotid bruit  Lungs: Clear to auscultation b/l  Mental Status: Awake alert  and oriented to place, person but not to year. Follows commands. No aphasia or dysarthria. Naming and repetition intact. Mood is appropriate.  Cranial Nerve: PERRL. Left field cut minor. EOMI. Facial sensation intact. No facial asymmetry. Hearing intact. Palate elevates. Uvula midline. SCM strong. No tongue deviation.  Motor: Normal tone. No cogwheel rigidity. No bradykinesia. No pronator drift. 5/5 strength bilaterally in the upper extremities including deltoids, biceps, triceps, wrist extensors/flexors, finger flexors/extensors, interossei, and APB. 5/5 strength bilaterally in the lower extremities including iliopsoas, hamstring, quadriceps, tibialis anterior, gastrocnemius, EHL.  Deep Tendon Reflexes: 2+ in biceps, tricepts, brachioradialis b/l. 2+ in patellar and ankle jerks.   Sensory: Intact to soft touch, cold, pinprick, vibration. No neglect.  Cerebellar: Finger to nose intact. Heel to shin intact.   Gait: Normal narrow based gait.      Other tests:    HbA1c:5.8  LDL: 38    Carotid US: No clinically significant area of stenosis.     ECHO:  The left ventricle is normal in size with concentric remodeling and normal systolic function.  The estimated ejection fraction is 60%.  Normal left ventricular diastolic function.  Normal right ventricular size with normal right ventricular systolic function.  Mild aortic regurgitation.  There is moderate pulmonary hypertension.  Normal central venous pressure (3 mmHg).  The estimated PA systolic pressure is 52 mmHg.      CT Head.   Chronic microvascular ischemic changes.    CTA Head and Neck:   Chronic right posterior cerebral artery occlusion with right posterior cerebral artery territory infarct  Dense atherosclerotic plaque of the right and of the left cavernous ICA segments.  Bilateral common carotid artery bifurcation calcified plaque with low-grade 20% or less right internal carotid artery origin stenosis.     Event Monitor:  Negative event monitor with no clinical  arrhythmias.  Symptoms corresponding with normal sinus rhythm.    Assessment:    Ms. Wood is a 82 year old female with a history of GERD, CAD, MI, hypertension, cardiomyopathy and CVA in 2020 who presented to the ED for eval of worsening confusion, aphasia in February 2023. Symptoms resolved. CT with chronic microvascular changes but no acute intracranial processes. MRI/MRA unable to be done d/t nerve stimulator that is not compatible with MRI. Event monitoring is negative for atrial fibrillation. Now back to baseline. High suspicion of atrial fibrillation. Will recommend loop recorder.         Problem List:  - TIA  - History of stroke in 2020  - CAD  - MI  - HTN    Plan:  -- Activity as tolerated  -- ASA 81 mg daily PO   -- Completed 21 days of clopidogrel   -- Atorvastatin 40 mg daily   -- Continue PT/OT and speech   -- Event monitoring is negative. I am recommending placing loop recorder.Refer to cardiology    -- We had an in depth discussion with patient and family regarding the imaging findings, TIA etiology and our plan     -- We discussed her vascular risk factors and the need for her to continue to modify her risks e.g. taking her mediations, proper diet, and exercise.  -- Work with primary care provider on stroke risk factor management, BP <140/90, cholesterol monitoring  -- We discussed the need for her to continue to exercise her body and her mind with physical activity and mental activities.  -- We discussed BEFAST and the need for her to recognize stroke symptoms and report to the ER early is needed.    B-Balance  E-Eyes, vision  F-Facial Droop  A-Arm or leg weakness on one side  S-Speech trouble  T-Time - CALL 911!    -- Please have this patient follow up in my clinic on PRN basis.     Andrey Rao MD  Neurology   [facility]

## 2023-04-06 ENCOUNTER — CLINICAL SUPPORT (OUTPATIENT)
Dept: REHABILITATION | Facility: HOSPITAL | Age: 83
End: 2023-04-06
Payer: MEDICARE

## 2023-04-06 DIAGNOSIS — R41.841 COGNITIVE COMMUNICATION DISORDER: ICD-10-CM

## 2023-04-06 DIAGNOSIS — Z74.09 IMPAIRED FUNCTIONAL MOBILITY, BALANCE, GAIT, AND ENDURANCE: Primary | ICD-10-CM

## 2023-04-06 DIAGNOSIS — R47.01 APHASIA: Primary | ICD-10-CM

## 2023-04-06 PROCEDURE — 97129 THER IVNTJ 1ST 15 MIN: CPT

## 2023-04-06 PROCEDURE — 97112 NEUROMUSCULAR REEDUCATION: CPT

## 2023-04-06 PROCEDURE — 97110 THERAPEUTIC EXERCISES: CPT

## 2023-04-06 PROCEDURE — 97130 THER IVNTJ EA ADDL 15 MIN: CPT

## 2023-04-06 NOTE — PROGRESS NOTES
[facility]  Speech Therapy Treatment Note- Neurological Rehabilitation    Date: 4/6/2023     Name: Leslie Wood   MRN: [id number]   Therapy Diagnosis:   Encounter Diagnoses   Name Primary?    Aphasia Yes    Cognitive communication disorder      Physician: Sammy Juarez MD  Physician Orders: [id number] - AMB REFERRAL/CONSULT TO SPEECH THERAPY  Medical Diagnosis: R47.01 (ICD-10-CM) - Aphasia    Visit #/ Visits Authorized: 9/10  Date of Evaluation:  2/17/2023   Insurance Authorization Period: 2/23/2023 - 12/31/2023   Plan of Care Expiration Date:  4/12/2023   Extended Plan of Care:  NA   Progress Note: 4/20/2023    Visits Cancelled: 0  Visits No Show: 0    Time In:  9:35 AM   Time Out: 10:35 AM   Total Billable Time: 60 minutes      Precautions: Standard  Subjective:   Patient reports: Her grandson plans to move out soon. Her daughter will still be living with her.   She was compliant to home exercise program.   Response to previous treatment: Continued progress towards visual selective attention.   Pain Scale:  0/10 on a Visual Analog Scale currently.   Pain Location:  N/A  Objective:   TIMED  Procedure Min.   Cognitive Therapeutic Interventions, first 15 minutes CPT 52618  15   Cognitive Therapeutic Interventions, each additional 15 minutes CPT 20057  45         UNTIMED  Procedure Min.   Speech- Language- Voice Therapy  0     Total Timed Units: 4  Total Untimed Units: 0  Charges Billed/Number of units: 08958/1; 22495/3    Short Term Goals: (6 weeks) Current Progress:   Patient will describe functional objects/images to facilitate a strategy of description in order to increase transfer of intended message with 80% accuracy and moderate clinician cueing.     Progressing/ Not Met 4/6/2023   Not formally addressed.     Patient will complete word finding task (i.e. Creating subject, verb, object pairs in VNEST protocol) with 90% accuracy moderate assistance to improve word fluency.     Met x2 previously      Progressing/ Not Met 4/6/2023   Patient completed VNEST task X3 (read, take, catch). She generated subjects with 80% accuracy independently and objects with 80% accuracy independently. She requires cueing to utilize the verb given.    Patient will utilize word finding strategies in structured tasks with 70% accuracy and minimal cues.      Progressing/ Not Met 4/6/2023   Not formally addressed.    Patient will summarize read material to improve word fluency, word finding, and reading comprehension in Attentive Reading and Constrained Summarization (ARCS) protocol with 80% accuracy and minimal verbal assistance across 2-3 sessions.     Progressing/ Not Met 4/6/2023   Not formally addressed.      Patient will write 7-10 word sentences with no more than 2 syntactic/grammatical errors given minimal cueing.      Progressing/ Not Met 4/6/2023   Not formally addressed.      Patient will complete functional writing task with complete, coherent, and accurately spelled responses with 80% accuracy and minimal verbal and visual assistance across 3-5 sessions.     Progressing/ Not Met 4/6/2023   Not formally addressed.      Patient will complete RBANS assessment to further assess cognitive communication skills.        GOAL MET 2/27/2023  Completed today. See below for results.       GOAL MET 2/27/2023    Patient will sustain attention to a moderate task (auditory or visual) for 2 minutes with 90% accuracy or no more than 1 redirection.     Visual: GOAL MET 3/20/2023  Auditory: GOAL MET 3/20/2023  VISUAL:   Task 1: Visual sustained attention task completed in which patient visually scanned an article looking for certain letters. She completed this task with 87% accuracy independently (36/41). Clinician declan a purple line on the left hand side to serve an anchor. Patient utilized a blank paper to keep track of which line she was working on.     Task 2: Visual sustained attention task completed in which patient visually scanned  a phone bill looking for unexplained calls. She completed this task with 90% accuracy given minimal cueing. The one error that she made was due to skipping over an area code that was more offset to the left than the others. Clinician and patient dicussed left neglect in depth that is likely due to her first cerebral vascular accident (right cerebral vascular accident).     AUDITORY:  Task 1: Constant Therapy: Understanding Voicemails was completed today with 90% accuracy independently (improvement from last session with 70% accuracy). She required minimal assistance for navigating the application (pressing the next button, replaying the voicemail)    GOAL MET 3/20/2023    Patient will complete selective attention tasks (auditory or visual) with 90% accuracy independently increase selective attention.    Visual: GOAL MET 4/6/2023  Auditory: Met x1 Visual:   Medication management task. Patient was asked to sort three medications into a weekly pill box using the instructions provided on each pill bottle. She completed this task with 92% accuracy (correctly sorted 26 out of 28 individual pills) independently. Clinician and patient discussed the two missed pills and potential negative consequences of missing medication.     Auditory:  Task 1: Patient completed auditory selective attention task of following two step directions with natural background noise. She completed this task with 82% accuracy independently. Clinician and patient then discussed strategies for improved accuracy such as replaying the instructions if she is not certain and paying close attention to all parts of the instruction. She then completed this task with 85% accuracy.    Patient will use attention shifting strategies to shift attention between two tasks (auditory or visual) with no more than 3 cues or 90% accuracy to improve alternating attention. Not formally addressed.    Patient will complete short term recall tasks after a 5 minutes delay  with 90% accuracy  independently  with use of memory strategies to improve recall of information and generalization of memory strategies. Not formally addressed.    Patient will use Goal Plan Action Review strategy to complete moderate to complex reasoning, planning, or organization tasks with 90% accuracy independently to improve functional executive function skills. Not formally addressed.    Patient will complete a functional task to improve attention, memory and/or executive functions (I.e. sample bill paying activity, recipe, or multiple choice comprehension questions to 1 paragraphs) with 80% accuracy and natural environment noise distractions (TV news background, music, etc.). Not formally addressed.    Patient will be educated regarding cognitive deficits as applicable to the patient's life for increased awareness and will execute returned demonstration of information with 80% accuracy.           GOAL MET 3/6/2023  Education provided regarding results from her assessment, the hierarchy of neuropsychological functions, and the role of attention in other executive functions. Patient and granddaughter verbalized understanding.     GOAL MET 3/6/2023    Patient will independently generate strategies to improve ability to complete tasks with enhanced accuracy and time, based on review of objective previous performance on trials of functional tasks with 80% accuracy.  Not formally addressed.    Given implementation of strategies, patient will complete a task following initial attempt with 90% accuracy and reported reduced number on the relative scale of cognitive load when compared to previous trial.  Not formally addressed.    Patient will use external memory strategies in order to recall important dates, events, appointments, or tasks to be completed with 90% accuracy independently.  Not formally addressed.        Patient Education/Response:   Patient and her granddaughter were educated on the  cognitive-communication strategies and word finding strategies. They verbalized understanding of all discussed.     Home program established: yes-Patient provided word finding strategy handout  Exercises were reviewed and Leslie was able to demonstrate them prior to the end of the session.  Leslie demonstrated good  understanding of the education provided.     See Electronic Medical Record under Patient Instructions for exercises provided throughout therapy.  Assessment:   Leslie is progressing well towards her goals. She is motivated to improve and participates in all therapy tasks. She has partially met the selective attention goal. She will benefit from continued speech therapy to address the above deficits and to utilize strategies as needed. Current goals remain appropriate. Goals will be updated as needed.     Patient prognosis is Good. Patient will continue to benefit from skilled outpatient speech and language therapy to address the deficits listed in the problem list on initial evaluation, provide patient/family education and to maximize patient's level of independence in the home and community environment.   Medical necessity is demonstrated by the following IMPAIRMENTS:  Language deficits impact overall quality of life, ability to participation in social and community interactions, and the ability to communicate important medical information if needed.   Cognitive-communication deficits impact overall quality of life and the ability to safely and independently complete activities of daily living.   Barriers to Therapy: none   Patient's spiritual, cultural and educational needs considered and patient agreeable to plan of care and goals.  Plan:   Continue Plan of Care with focus on attention, executive functions, language.     Becca Blanco, CCC-SLP, CBIS   Speech Language Pathologist   Certified Brain Injury Specialist   4/6/2023

## 2023-04-06 NOTE — PLAN OF CARE
[facility]   Physical Therapy Treatment Note + Progress Note    Name: Leslie CASTELLON [facility] Number: [id number]    Therapy Diagnosis:   Encounter Diagnosis   Name Primary?    Impaired functional mobility, balance, gait, and endurance Yes     Physician: Sepideh Peters,*    Visit Date: 4/6/2023    Physician Orders: PT Eval and Treat   Medical Diagnosis from Referral: R29.6 (ICD-10-CM) - Falls frequently  Evaluation Date: 2/27/2023  Authorization Period Expiration: 2/17/24  Plan of Care Expiration: 4/28/23  Progress Note Due: 5/6/23  Visit # / Visits authorized: 7/12 (+eval)  FOTO: 0/3 time constraints     Precautions: Standard and Fall     PTA Visit #: 0/5     Time In: 0855  Time Out: 0930  Total Billable Time: 35 minutes    SUBJECTIVE     Pt reports: that she continues to have low back pain and took medicine this morning.   She reports that she was not compliant with Home Exercise Program as she left it in her granddaughter's car.   Response to previous treatment: She responded well to last session.   Functional change: She reports no functional change since last session.     Pain: 6/10 (non-verbal)  Location: low back     OBJECTIVE     Objective Measures updated at progress report unless specified.   4/6/23  She is able to perform all Transfers and bed mobility with modified [place].   She ambulates with her rollator for >150 ft with supervision, needing intermittent cues for environmental obstacles in gym and directions to ST office.     Treatment     Leslie received the treatments listed below:      THERAPEUTIC EXERCISES to develop strength, endurance, ROM, flexibility, posture, and core stabilization for (20) minutes including:    Intervention Performed Today    Supine Stretches  - 3x30 seconds bilateral for each:  - lower trunk rotation   - single knee to chest  - hamstring/ heelcord   Seated Stretches  - 3x30 seconds bilateral for each:  - hamstring/ heelcord  - hip adductor    Standing Stretches  - 3x15 seconds bilateral for each:  - IT band   - heelcord    Seated lower extremity exercises          - hip flexion 2x10 bilateral   - hamstring curl 2x10 bilateral   - dorsiflexion/ plantarflexion 2x10 bilateral with green band  - eversion 1x10 bilateral   - toe curls and flares 1x20 bilateral   - hip adduction against ball 1x10 with 3 second hold  - hip abduction against belt 1x10 with 3 second hold   Standing lower extremity exercises  X  X  X  X - hip flexion 1x10 bilateral   - hip abduction 1x10 bilateral   - hip extension 1x10 bilateral   - heel raise 1x10    NuStep for endurance  - Level 1-2, 7 minutes   Supine     X - Quad set with short arc quad 2x10 with 3 second hold   - dorsiflexion against green band 2x10 Right   - straight leg raise 2x10 with core contraction   Lower Trunk Rotations  10 second holds x 2 minutes   Pelvic tilts x 2x15   Abdominal Bracing x 2x10 with 2-3 second end range hold     neuromuscular re-education activities to improve: Balance, Coordination, Kinesthetic, Sense, Proprioception, and Posture for (10) minutes. The following activities were included:    Intervention Performed Today    Air Ex Beam X  X  X       - Tandem holds 5 seconds, 2x's bilateral   - Tandem walking forward/backward 2x's each  - Side stepping 2x's each direction  - tandem walking over hurdles forward and side stepping 2x's each  - heel lifts and toe lifts off of beam with 2-0 hand supports   - single leg standing while tapping opposite foot forward and back on beam   Orange Rubber Beam    - Tandem holds 3 seconds, 3x's bilateral   - Tandem walking forward 4x's   - Side stepping 2x's each direction  - single leg standing while tapping opposite foot forward and back on beam   Air Ex Pad              - Weight shifts Left to Right 2x10  - Weight shifts forward/back 2x10  - single leg standing holds   - Eyes closed 10 seconds  - backward pivot steps with 1 hand support 1x8 bilateral   - standing  on pad while toss/catching ball to wall 1x8  - One lower extremity on pad and opposite lower extremity on step tossing ball to wall    Hand paddled per Neuro LEONIE technique and for entirety of session     Core Exercises in supine x  X  X  X   Bilateral lower extremities over Tball for each:   - lower trunk rotation 2x10  - bilateral knees to chest 2x10  - holding table top position while pulling yellow theraband down with bilateral arms  - posterior pelvic tilt to bridge 2x10   Parallel bars      - walking backward 4x8 ft with 2-1 hand support  - braided walking 4x's length of bars  - narrow base standing without hand support performing head turns/nods 1x5 each, arm reach/lifts 1x5 each   Trunk extension for posture  - 2x10 over pillow and ball at low back    Sit to squat without hand support  2x5 from progressively lowered mat   Neoprene wrap to Right knee for all exercises    THERAPEUTIC ACTIVITIES to improve dynamic and functional performance for (5) minutes including:    Intervention Performed Today    Patient and her granddaughter educated on Home Exercise Program for balance.  Also educated on wearing tennis shoes to future sessions.  Discussed the process of changing schedule to get her seen by an ortho PT 1 day/week then by me 1day/week  - Patient and granddaughter verbalized good understanding of education provided.    Patient educated on abdominal/core strengthening exercises for Home Exercise Program  x She was given handout of Home Exercise Program addition and verbalized good understanding   Re-Assessment of functional mobility for progress note x - see above                                   MANUAL THERAPY TECHNIQUES were applied for (0) minutes, including:    Manual Intervention Performed Today    Soft Tissue Mobilization [] Left lumbar paraspinal   Joint Mobilizations []     []     []    Functional Dry Needling  [] Left lumbosacral multifidus and paraspinal     Plan for Next Visit: Continue as  needed         Gait Training: to improve functional mobility and safety for  () minutes, including:  -     Patient Education and Home Exercises     Home Exercises Provided and Patient Education Provided     Education provided:   - 3/14/23 Patient educated on Home Exercise Program for balance   - 3/16/23 Patient encouraged to stay compliant with Home Exercise Program   - 4/6/23 Patient educated on Home Exercise Program for abdominal strengthening.  Written Home Exercises Provided: yes. Exercises were reviewed and Leslie was able to demonstrate them prior to the end of the session.  Leslie verbalized good  understanding of the education provided. See EMR under Patient Instructions for exercises provided during therapy sessions    ASSESSMENT     Patient responded well to session today.  Despite complaints of back pain she was able to fully participate with all tasks, holding easy conversation.  She demonstrated improved balance quality with tandem stance on beam and responded well to all core exercises.  She was given added Home Exercise Program today and verbalized understanding. Since evaluation, she has demonstrated improved balance and endurance with tasks.  She has been receiving dry needling and manual therapy from ortho PT James Lujan and would continue to benefit from outpatient PT to continue to work towards improved safety and balance with mobility.     Leslie Is progressing well towards her goals.   Pt prognosis is Good.     Pt will continue to benefit from skilled outpatient physical therapy to address the deficits listed in the problem list box on initial evaluation, provide pt/family education and to maximize pt's level of independence in the home and community environment.     Pt's spiritual, cultural and educational needs considered and pt agreeable to plan of care and goals.     Anticipated barriers to physical therapy: co-morbidities, sedentary lifestyle, chronicity of condition, and lack of understanding  of condition     GOALS:     Short Term Goals:  4 weeks Progress    Function: Patient will demonstrate improved function as indicated by a functional limitation score improved by 3 points from initial measure.  PC   Strength: Patient will demonstrate improved strength by performing 5x sit to  20 seconds in order to progress towards independence with functional activities.  PC   Balance: Complete Encarnacion Balance Scale and set goals accordingly.  PC   Coordination: Patient will demonstrate improved time of 13 sec on TUG to demonstrate improved coordination and safety with mobility. PC   HEP: Patient will demonstrate independence with HEP in order to progress toward functional independence.  Met   Determine the need of Custom Wheelchair Eval to improve patient's positioning and independence with pressure reliefs and independence with home and community mobility to decrease burden of care. D/C not needed at this time.              Long Term Goals:  8 weeks Progress   Function: Patient will demonstrate improved function as indicated by a functional limitation score improved 5 points from initial measure.  PC   Strength: Patient will demonstrate improved strength by performing 5x sit to  17 seconds in order to progress towards independence with functional activities.  PC   Patient will return to ADL's, IADL's, community involvement, recreational activities, and work-related activities with less than or equal to 5/10 pain and maximal function.  PC   Balance: Complete Encarnacion Balance Scale and set goals accordingly.  PC   Coordination: Patient will demonstrate improved time of 11 sec on TUG to demonstrate improved coordination and safety with mobility. PC   HEP: Patient educated on HEP in preparation for d/c verbalizing and demonstrating good understanding of topics discussed.   PC            Goals Key:  PC= progressing/continue;        PM= partially met;             DC= discontinue    PLAN     Continue Plan of  Care (POC) and progress per patient tolerance. See Treatment section for exercise progression.    Eugenie Stone, PT

## 2023-04-11 ENCOUNTER — CLINICAL SUPPORT (OUTPATIENT)
Dept: REHABILITATION | Facility: HOSPITAL | Age: 83
End: 2023-04-11
Payer: MEDICARE

## 2023-04-11 DIAGNOSIS — Z74.09 IMPAIRED FUNCTIONAL MOBILITY, BALANCE, GAIT, AND ENDURANCE: Primary | ICD-10-CM

## 2023-04-11 DIAGNOSIS — R41.841 COGNITIVE COMMUNICATION DISORDER: ICD-10-CM

## 2023-04-11 DIAGNOSIS — R47.01 APHASIA: Primary | ICD-10-CM

## 2023-04-11 PROCEDURE — 97130 THER IVNTJ EA ADDL 15 MIN: CPT | Performed by: SPEECH-LANGUAGE PATHOLOGIST

## 2023-04-11 PROCEDURE — 97112 NEUROMUSCULAR REEDUCATION: CPT

## 2023-04-11 PROCEDURE — 97110 THERAPEUTIC EXERCISES: CPT

## 2023-04-11 PROCEDURE — 97129 THER IVNTJ 1ST 15 MIN: CPT | Performed by: SPEECH-LANGUAGE PATHOLOGIST

## 2023-04-11 NOTE — PROGRESS NOTES
[facility]  Speech Therapy Treatment Note- Neurological Rehabilitation    Date: 4/11/2023     Name: Leslie Wood   MRN: [id number]   Therapy Diagnosis:   Encounter Diagnoses   Name Primary?    Aphasia Yes    Cognitive communication disorder    Physician: Sammy Juarez MD  Physician Orders: [id number] - AMB REFERRAL/CONSULT TO SPEECH THERAPY  Medical Diagnosis: R47.01 (ICD-10-CM) - Aphasia    Visit #/ Visits Authorized: 10 / 10  Date of Evaluation:  2/17/2023   Insurance Authorization Period: 2/23/2023 - 12/31/2023   Plan of Care Expiration Date:  4/12/2023   Extended Plan of Care:  NA   Progress Note: 4/20/2023    Visits Cancelled: 0  Visits No Show: 0    Time In:  8:45 AM   Time Out: 9:32 AM   Total Billable Time: 47 minutes      Precautions: Standard  Subjective:   Patient reports: she had a great Easter weekend. Denies significant change since previous appointment.   She was compliant to home exercise program.     Response to previous treatment: Continued progress towards visual selective attention.     Pain Scale:  0/10 on a Visual Analog Scale currently.   Pain Location:  N/A  Objective:   TIMED  Procedure Min.   Cognitive Therapeutic Interventions, first 15 minutes CPT 18948  15   Cognitive Therapeutic Interventions, each additional 15 minutes CPT 01368  32         UNTIMED  Procedure Min.   Speech- Language- Voice Therapy  0     Total Timed Units: 3  Total Untimed Units: 0  Charges Billed/Number of units: 12824/1; 07144/2    Short Term Goals: (6 weeks) Current Progress:   Patient will describe functional objects/images to facilitate a strategy of description in order to increase transfer of intended message with 80% accuracy and moderate clinician cueing.     Progressing/ Not Met 4/11/2023   Not formally addressed.     Patient will complete word finding task (i.e. Creating subject, verb, object pairs in VNEST protocol) with 90% accuracy moderate assistance to improve word fluency.     Met x2 previously      Progressing/ Not Met 4/11/2023   Not formally addressed     Patient will utilize word finding strategies in structured tasks with 70% accuracy and minimal cues.      Progressing/ Not Met 4/11/2023   Not formally addressed.    Patient will summarize read material to improve word fluency, word finding, and reading comprehension in Attentive Reading and Constrained Summarization (ARCS) protocol with 80% accuracy and minimal verbal assistance across 2-3 sessions.     Progressing/ Not Met 4/11/2023   Not formally addressed.      Patient will write 7-10 word sentences with no more than 2 syntactic/grammatical errors given minimal cueing.      Progressing/ Not Met 4/11/2023   Not formally addressed.      Patient will complete functional writing task with complete, coherent, and accurately spelled responses with 80% accuracy and minimal verbal and visual assistance across 3-5 sessions.     Progressing/ Not Met 4/11/2023   Not formally addressed.      Patient will complete RBANS assessment to further assess cognitive communication skills.        GOAL MET 2/27/2023  Completed today. See below for results.       GOAL MET 2/27/2023    Patient will sustain attention to a moderate task (auditory or visual) for 2 minutes with 90% accuracy or no more than 1 redirection.     Visual: GOAL MET 3/20/2023  Auditory: GOAL MET 3/20/2023  VISUAL:   Task 1: Visual sustained attention task completed in which patient visually scanned an article looking for certain letters. She completed this task with 87% accuracy independently (36/41). Clinician declan a purple line on the left hand side to serve an anchor. Patient utilized a blank paper to keep track of which line she was working on.     Task 2: Visual sustained attention task completed in which patient visually scanned a phone bill looking for unexplained calls. She completed this task with 90% accuracy given minimal cueing. The one error that she made was due to skipping over an area  code that was more offset to the left than the others. Clinician and patient dicussed left neglect in depth that is likely due to her first cerebral vascular accident (right cerebral vascular accident).     AUDITORY:  Task 1: Constant Therapy: Understanding Voicemails was completed today with 90% accuracy independently (improvement from last session with 70% accuracy). She required minimal assistance for navigating the application (pressing the next button, replaying the voicemail)    GOAL MET 3/20/2023    Patient will complete selective attention tasks (auditory or visual) with 90% accuracy independently increase selective attention.    Visual: GOAL MET 4/6/2023  Auditory: Met x1 Visual:   Medication management task. Patient was asked to sort three medications into a weekly pill box using the instructions provided on each pill bottle. She completed this task with 92% accuracy (correctly sorted 26 out of 28 individual pills) independently. Clinician and patient discussed the two missed pills and potential negative consequences of missing medication.     Auditory:  Patient completed auditory selective attention task of listening to voicemails with natural background noise. She was cued to take note, request repetition of voicemail as strategy as needed. She attempted initially, requiring ~5-7 repetitions per voicemail and moderate cues for use of strategies. She answered questions about voicemails with 46% accuracy. She also demonstrated moments of confabulation, adding details that were not presented, likely due to moments of reduced attention. Discussion of poor use of strategies (notes lacking relevant details, etc) for second attempt. She rated task as 5/7 on relative scale of cognitive load.     On second attempt, patient cued to listen for portions of voicemail as able and request repetitions. She became very upset during presentation of more complex voicemail and this attempt was discontinued.      Patient  will use attention shifting strategies to shift attention between two tasks (auditory or visual) with no more than 3 cues or 90% accuracy to improve alternating attention. Not formally addressed.    Patient will complete short term recall tasks after a 5 minutes delay with 90% accuracy  independently  with use of memory strategies to improve recall of information and generalization of memory strategies. Not formally addressed.    Patient will use Goal Plan Action Review strategy to complete moderate to complex reasoning, planning, or organization tasks with 90% accuracy independently to improve functional executive function skills. Not formally addressed.    Patient will complete a functional task to improve attention, memory and/or executive functions (I.e. sample bill paying activity, recipe, or multiple choice comprehension questions to 1 paragraphs) with 80% accuracy and natural environment noise distractions (TV news background, music, etc.). Not formally addressed.    Patient will be educated regarding cognitive deficits as applicable to the patient's life for increased awareness and will execute returned demonstration of information with 80% accuracy.           GOAL MET 3/6/2023  Education provided regarding results from her assessment, the hierarchy of neuropsychological functions, and the role of attention in other executive functions. Patient and granddaughter verbalized understanding.     GOAL MET 3/6/2023    Patient will independently generate strategies to improve ability to complete tasks with enhanced accuracy and time, based on review of objective previous performance on trials of functional tasks with 80% accuracy.  Not formally addressed.    Given implementation of strategies, patient will complete a task following initial attempt with 90% accuracy and reported reduced number on the relative scale of cognitive load when compared to previous trial.  Not formally addressed.    Patient will use  external memory strategies in order to recall important dates, events, appointments, or tasks to be completed with 90% accuracy independently.  Not formally addressed.        Patient Education/Response:   Patient and her granddaughter were educated on the cognitive-communication strategies and word finding strategies. They verbalized understanding of all discussed.     Home program established: yes-Patient provided word finding strategy handout  Exercises were reviewed and Leslie was able to demonstrate them prior to the end of the session.  Leslie demonstrated good  understanding of the education provided.     See Electronic Medical Record under Patient Instructions for exercises provided throughout therapy.  Assessment:   Patient is very motivated to improve and participates in all therapy tasks presented. In today's session, she demonstrated significant difficulty with attending to auditory attention/voicemails task, demonstrating reduced thought organization, confabulation in moments of reduced attention with frustration with deficits further impacting overall attention. She will benefit from continued speech therapy to address the above deficits and to utilize strategies as needed.     Leslie is progressing well towards her goals.Current goals remain appropriate. Goals will be updated as needed.     Patient prognosis is Good. Patient will continue to benefit from skilled outpatient speech and language therapy to address the deficits listed in the problem list on initial evaluation, provide patient/family education and to maximize patient's level of independence in the home and community environment.     Medical necessity is demonstrated by the following IMPAIRMENTS:  Language deficits impact overall quality of life, ability to participation in social and community interactions, and the ability to communicate important medical information if needed.   Cognitive-communication deficits impact overall quality of life and  the ability to safely and independently complete activities of daily living.     Barriers to Therapy: none   Patient's spiritual, cultural and educational needs considered and patient agreeable to plan of care and goals.  Plan:   Continue Plan of Care with focus on sustained/selective attention for functional daily tasks.     Rabia Lane MA, CCC-SLP  Speech Language Pathologist  4/11/2023

## 2023-04-11 NOTE — PROGRESS NOTES
[facility]   Physical Therapy Treatment Note     Name: Leslie CASTELLON [facility] Number: [id number]    Therapy Diagnosis:   Encounter Diagnosis   Name Primary?    Impaired functional mobility, balance, gait, and endurance Yes       Physician: Sepideh Peters,*    Visit Date: 4/11/2023    Physician Orders: PT Eval and Treat   Medical Diagnosis from Referral: R29.6 (ICD-10-CM) - Falls frequently  Evaluation Date: 2/27/2023  Authorization Period Expiration: 2/17/24  Plan of Care Expiration: 4/28/23  Progress Note Due: 5/6/23  Visit # / Visits authorized: 7/12 (+eval)  FOTO: 0/3 time constraints     Precautions: Standard and Fall     PTA Visit #: 0/5     Time In: 0931  Time Out: 1011  Total Billable Time: 40 minutes    SUBJECTIVE     Pt reports: that she is doing ok today.   She reports that she was not compliant with Home Exercise Program as she left it in her granddaughter's car.   Response to previous treatment: She responded well to last session.   Functional change: She reports no functional change since last session.     Pain: 0/10  Location: low back     OBJECTIVE     Objective Measures updated at progress report unless specified.     Treatment     Leslie received the treatments listed below:      THERAPEUTIC EXERCISES to develop strength, endurance, ROM, flexibility, posture, and core stabilization for (22) minutes including:    Intervention Performed Today    Supine Stretches  - 3x30 seconds bilateral for each:  - lower trunk rotation   - single knee to chest  - hamstring/ heelcord   Seated Stretches  - 3x30 seconds bilateral for each:  - hamstring/ heelcord  - hip adductor   Standing Stretches  - 3x15 seconds bilateral for each:  - IT band   - heelcord    Seated lower extremity exercises          - hip flexion 2x10 bilateral   - hamstring curl 2x10 bilateral   - dorsiflexion/ plantarflexion 2x10 bilateral with green band  - eversion 1x10 bilateral   - toe curls and flares 1x20  bilateral   - hip adduction against ball 1x10 with 3 second hold  - hip abduction against belt 1x10 with 3 second hold   Standing lower extremity exercises  X  X  X  X - hip flexion 2x10 bilateral   - hip abduction 2x10 bilateral   - hip extension 2x10 bilateral   - heel raise 2x10    NuStep for endurance x - Level 2, 6 minutes   Supine X  X - Quad set with short arc quad 2x10 with 3 second hold   - dorsiflexion against green band 2x10 bilateral    Lower Trunk Rotations x - 2x10    Pelvic tilts x 3 seconds hold 1x10   Bridges  x 1x10   Abdominal Bracing x Knee to chest - 2x10 Left    Straight leg raise  x - 1x10 bilateral    Neoprene wrap to Right knee for all exercises    neuromuscular re-education activities to improve: Balance, Coordination, Kinesthetic, Sense, Proprioception, and Posture for (8) minutes. The following activities were included:    Intervention Performed Today    Air Ex Beam X  X  X     - Tandem holds 5 seconds, 3x's   - Tandem walking forward/backward 4x's each  - Side stepping 2x's each direction  - tandem walking over hurdles forward and side stepping 2x's each  - heel lifts and toe lifts off of beam with 2-0 hand supports   - single leg standing while tapping opposite foot forward and back on beam   Orange Rubber Beam    - Tandem holds 3 seconds, 3x's bilateral   - Tandem walking forward 4x's   - Side stepping 2x's each direction  - single leg standing while tapping opposite foot forward and back on beam   Air Ex Pad              - Weight shifts Left to Right 2x10  - Weight shifts forward/back 2x10  - single leg standing holds   - Eyes closed 10 seconds  - backward pivot steps with 1 hand support 1x8 bilateral   - standing on pad while toss/catching ball to wall 1x8  - One lower extremity on pad and opposite lower extremity on step tossing ball to wall    Hand paddled per Neuro LEONIE technique and for entirety of session     Core Exercises in supine  Bilateral lower extremities over Tball for  each:   - lower trunk rotation 2x10  - bilateral knees to chest 2x10  - posterior pelvic tilt to bridge 2x10   Parallel bars      - walking backward 4x8 ft with 2-1 hand support  - braided walking 4x's length of bars  - narrow base standing without hand support performing head turns/nods 1x5 each, arm reach/lifts 1x5 each   Trunk extension for posture  - 2x10 over pillow and ball at low back    Sit to squat without hand support  2x5 from progressively lowered mat   Neoprene wrap to Right knee for all exercises    THERAPEUTIC ACTIVITIES to improve dynamic and functional performance for () minutes including:    Intervention Performed Today    Patient and her granddaughter educated on Home Exercise Program for balance.  Also educated on wearing tennis shoes to future sessions.  Discussed the process of changing schedule to get her seen by an ortho PT 1 day/week then by me 1day/week  - Patient and granddaughter verbalized good understanding of education provided.                                              MANUAL THERAPY TECHNIQUES were applied for (0) minutes, including:    Manual Intervention Performed Today    Soft Tissue Mobilization [] Left lumbar paraspinal   Joint Mobilizations []     []     []    Functional Dry Needling  [] Left lumbosacral multifidus and paraspinal     Plan for Next Visit: Continue as needed         Gait Training: to improve functional mobility and safety for  () minutes, including:  - working on increasing step length and upright posture with gait sequence, ambulating 200ft and 100ft with contact guard assist for safety awareness.     Patient Education and Home Exercises     Home Exercises Provided and Patient Education Provided     Education provided:   - 3/14/23 Patient educated on Home Exercise Program for balance   - 3/16/23 Patient encouraged to stay compliant with Home Exercise Program   Written Home Exercises Provided: yes. Exercises were reviewed and Leslie was able to demonstrate them  prior to the end of the session.  Leslie verbalized good  understanding of the education provided. See EMR under Patient Instructions for exercises provided during therapy sessions    ASSESSMENT     Patient responded well to treatment tasks today.  She participated well with all tasks but continues to be limited by Right knee pain and needed neoprene wrap to fully participate with leg exercises.   She has good potential to continue to benefit from PT treatment.     Leslie Is progressing well towards her goals.   Pt prognosis is Good.     Pt will continue to benefit from skilled outpatient physical therapy to address the deficits listed in the problem list box on initial evaluation, provide pt/family education and to maximize pt's level of independence in the home and community environment.     Pt's spiritual, cultural and educational needs considered and pt agreeable to plan of care and goals.     Anticipated barriers to physical therapy: co-morbidities, sedentary lifestyle, chronicity of condition, and lack of understanding of condition     GOALS:     Short Term Goals:  4 weeks Progress    Function: Patient will demonstrate improved function as indicated by a functional limitation score improved by 3 points from initial measure.  PC   Strength: Patient will demonstrate improved strength by performing 5x sit to  20 seconds in order to progress towards independence with functional activities.  PC   Balance: Complete Encarnacion Balance Scale and set goals accordingly.  PC   Coordination: Patient will demonstrate improved time of 13 sec on TUG to demonstrate improved coordination and safety with mobility. PC   HEP: Patient will demonstrate independence with HEP in order to progress toward functional independence.  Met   Determine the need of Custom Wheelchair Eval to improve patient's positioning and independence with pressure reliefs and independence with home and community mobility to decrease burden of care. D/C not  needed at this time.              Long Term Goals:  8 weeks Progress   Function: Patient will demonstrate improved function as indicated by a functional limitation score improved 5 points from initial measure.  PC   Strength: Patient will demonstrate improved strength by performing 5x sit to  17 seconds in order to progress towards independence with functional activities.  PC   Patient will return to ADL's, IADL's, community involvement, recreational activities, and work-related activities with less than or equal to 5/10 pain and maximal function.  PC   Balance: Complete Encarnacion Balance Scale and set goals accordingly.  PC   Coordination: Patient will demonstrate improved time of 11 sec on TUG to demonstrate improved coordination and safety with mobility. PC   HEP: Patient educated on HEP in preparation for d/c verbalizing and demonstrating good understanding of topics discussed.   PC            Goals Key:  PC= progressing/continue;        PM= partially met;             DC= discontinue    PLAN     Continue Plan of Care (POC) and progress per patient tolerance. See Treatment section for exercise progression.    Eugenie Stone, PT

## 2023-04-13 ENCOUNTER — PATIENT MESSAGE (OUTPATIENT)
Dept: REHABILITATION | Facility: HOSPITAL | Age: 83
End: 2023-04-13
Payer: MEDICARE

## 2023-04-17 ENCOUNTER — CLINICAL SUPPORT (OUTPATIENT)
Dept: REHABILITATION | Facility: HOSPITAL | Age: 83
End: 2023-04-17
Payer: MEDICARE

## 2023-04-17 DIAGNOSIS — R41.841 COGNITIVE COMMUNICATION DISORDER: ICD-10-CM

## 2023-04-17 DIAGNOSIS — R47.01 APHASIA: Primary | ICD-10-CM

## 2023-04-17 DIAGNOSIS — Z74.09 IMPAIRED FUNCTIONAL MOBILITY, BALANCE, GAIT, AND ENDURANCE: Primary | ICD-10-CM

## 2023-04-17 PROCEDURE — 97110 THERAPEUTIC EXERCISES: CPT

## 2023-04-17 PROCEDURE — 97129 THER IVNTJ 1ST 15 MIN: CPT

## 2023-04-17 PROCEDURE — 97130 THER IVNTJ EA ADDL 15 MIN: CPT

## 2023-04-17 NOTE — PLAN OF CARE
[facility]  Speech Therapy Updated Plan of Care-Neurological Rehabilitation         Date: 4/17/2023   Name: Leslie Wood  Clinic Number: [id number]    Therapy Diagnosis:   Encounter Diagnoses   Name Primary?    Aphasia Yes    Cognitive communication disorder      Physician: Sammy Juarez MD    Physician Orders: [id number] - AMB REFERRAL/CONSULT TO SPEECH THERAPY  Medical Diagnosis: R47.01 (ICD-10-CM) - Aphasia    Visit #/ Visits Authorized:  11 /20   Evaluation Date: 2/17/2023   Insurance Authorization Period: 2/23/2023 - 12/31/2023   Plan of Care Expiration:    4/12/2023   New POC Certification Period:  4/12/2023 - 6/26/2023     Total Visits Received: 11    Precautions:Standard and Fall  Subjective     Update: Patient continues to progress towards goals. She shows motivation for improvement and participates in all therapy tasks. She has strong family support and is willing to complete HEP.     Objective     Update: see follow up note dated 4/17/2023.     The patient was given the RICE-3 on initial evaluation and then again during session on 4/17/2023. The patient demonstrated a decrease in visual tracking rate. However, she demonstrated improvement in visual tracking accuracy. This indicates she is using visual scanning strategies as well as looking over her work, which resulted in increased time but, more importantly, increased accuracy.     Updated results from 4/17/2023  Subtest Score Severity   Visual Scanning and Tracking-Accuracy 13 Mild   Visual Scanning and Tracking-Rate 503 Moderate      Initial assessment from 2/17/2023   Subtest Score Severity   Visual Scanning and Tracking-Accuracy 61 Moderate   Visual Scanning and Tracking-Rate 362 Mild       Due to time constraints, other assessments that were given on initial evaluation were not re-tested. However, these may be given during upcoming sessions to further track progress.   Assessment     Update: Leslie Wood presents to [facility] status post medical diagnosis of Aphasia. Demonstrates impairments including limitations as described in the problem list. Positive prognostic factors include patient motivation and family support. Negative prognostic factors include multiple cerebral vascular accidents. She presents with a cognitive-communication disorder characterized by deficits in attention, memory, and executive functions. She also presents with anomia at the conversational level. It is uncertain whether this is a true aphasia or secondary to attention and thought organization deficits given unknown site of lesion (suspected left cerebral vascular accident although patient could not receive MRI due to nerve stimulator). No barriers to therapy identified.. Patient will benefit from skilled, outpatient rehabilitation speech therapy.    Rehab Potential: good   Pt's spiritual, cultural, and educational needs considered and patient agreeable to plan of care and goals.    Education: Plan of Care and role of SLP in care     Previous Short Term Goals Status: 6 weeks  Short Term Goals:   Patient will describe functional objects/images to facilitate a strategy of description in order to increase transfer of intended message with 80% accuracy and moderate clinician cueing.      Progressing/ Not Met 4/17/2023     Patient will complete word finding task (i.e. Creating subject, verb, object pairs in VNEST protocol) with 90% accuracy moderate assistance to improve word fluency.      Met x2 previously      Progressing/ Not Met 4/17/2023     Patient will utilize word finding strategies in structured tasks with 70% accuracy and minimal cues.       Progressing/ Not Met 4/17/2023     Patient will summarize read material to improve word fluency, word finding, and reading comprehension in Attentive Reading and Constrained Summarization (ARCS) protocol with 80% accuracy and minimal verbal assistance across 2-3 sessions.      Progressing/ Not Met 4/17/2023      Patient will write 7-10 word sentences with no more than 2 syntactic/grammatical errors given minimal cueing.       Progressing/ Not Met 4/17/2023     Patient will complete functional writing task with complete, coherent, and accurately spelled responses with 80% accuracy and minimal verbal and visual assistance across 3-5 sessions.      Progressing/ Not Met 4/17/2023     Patient will complete RBANS assessment to further assess cognitive communication skills.         GOAL MET 2/27/2023    Patient will sustain attention to a moderate task (auditory or visual) for 2 minutes with 90% accuracy or no more than 1 redirection.      Visual: GOAL MET 3/20/2023  Auditory: GOAL MET 3/20/2023    Patient will complete selective attention tasks (auditory or visual) with 90% accuracy independently increase selective attention.     Visual: GOAL MET 4/6/2023  Auditory: Met x1   Patient will use attention shifting strategies to shift attention between two tasks (auditory or visual) with no more than 3 cues or 90% accuracy to improve alternating attention.   Patient will complete short term recall tasks after a 5 minutes delay with 90% accuracy  independently  with use of memory strategies to improve recall of information and generalization of memory strategies.   Patient will use Goal Plan Action Review strategy to complete moderate to complex reasoning, planning, or organization tasks with 90% accuracy independently to improve functional executive function skills.   Patient will complete a functional task to improve attention, memory and/or executive functions (I.e. sample bill paying activity, recipe, or multiple choice comprehension questions to 1 paragraphs) with 80% accuracy and natural environment noise distractions (TV news background, music, etc.).   Patient will be educated regarding cognitive deficits as applicable to the patient's life for increased awareness and will execute returned demonstration of information with  80% accuracy.               GOAL MET 3/6/2023    Patient will independently generate strategies to improve ability to complete tasks with enhanced accuracy and time, based on review of objective previous performance on trials of functional tasks with 80% accuracy.    Given implementation of strategies, patient will complete a task following initial attempt with 90% accuracy and reported reduced number on the relative scale of cognitive load when compared to previous trial.    Patient will use external memory strategies in order to recall important dates, events, appointments, or tasks to be completed with 90% accuracy independently.         New Short Term Goals: 5 weeks  Short Term Goals:   Patient will describe functional objects/images to facilitate a strategy of description in order to increase transfer of intended message with 80% accuracy and moderate clinician cueing.      Progressing/ Not Met 4/17/2023     Patient will complete word finding task (i.e. Creating subject, verb, object pairs in VNEST protocol) with 90% accuracy moderate assistance to improve word fluency.      Met x2 previously      Progressing/ Not Met 4/17/2023     Patient will utilize word finding strategies in structured tasks with 70% accuracy and minimal cues.       Progressing/ Not Met 4/17/2023     Patient will summarize read material to improve word fluency, word finding, and reading comprehension in Attentive Reading and Constrained Summarization (ARCS) protocol with 80% accuracy and minimal verbal assistance across 2-3 sessions.      Progressing/ Not Met 4/17/2023     Patient will write 7-10 word sentences with no more than 2 syntactic/grammatical errors given minimal cueing.       Progressing/ Not Met 4/17/2023     Patient will complete functional writing task with complete, coherent, and accurately spelled responses with 80% accuracy and minimal verbal and visual assistance across 3-5 sessions.      Progressing/ Not Met 4/17/2023      Patient will complete selective attention tasks (auditory or visual) with 90% accuracy independently increase selective attention.     Visual: GOAL MET 4/6/2023  Auditory: Met x1   Patient will use attention shifting strategies to shift attention between two tasks (auditory or visual) with no more than 3 cues or 90% accuracy to improve alternating attention.   Patient will complete short term recall tasks after a 5 minutes delay with 90% accuracy  independently  with use of memory strategies to improve recall of information and generalization of memory strategies.   Patient will use Goal Plan Action Review strategy to complete moderate to complex reasoning, planning, or organization tasks with 90% accuracy independently to improve functional executive function skills.   Patient will complete a functional task to improve attention, memory and/or executive functions (I.e. sample bill paying activity, recipe, or multiple choice comprehension questions to 1 paragraphs) with 80% accuracy and natural environment noise distractions (TV news background, music, etc.).   Patient will independently generate strategies to improve ability to complete tasks with enhanced accuracy and time, based on review of objective previous performance on trials of functional tasks with 80% accuracy.    Given implementation of strategies, patient will complete a task following initial attempt with 90% accuracy and reported reduced number on the relative scale of cognitive load when compared to previous trial.    Patient will use external memory strategies in order to recall important dates, events, appointments, or tasks to be completed with 90% accuracy independently.         Long Term Goal Status:  10 weeks      Patient will improve functional communication in order to improve quality of life, participate in social and community interaction and to communicate an urgent message if needed.   Patient will improve cognitive-communication  skills in order to improve quality of life, participate in social and community interaction and to communicate an urgent message if needed    Goals Previously Met:  Patient will complete RBANS assessment to further assess cognitive communication skills.         GOAL MET 2/27/2023    Patient will sustain attention to a moderate task (auditory or visual) for 2 minutes with 90% accuracy or no more than 1 redirection.      Visual: GOAL MET 3/20/2023  Auditory: GOAL MET 3/20/2023    Patient will complete selective attention tasks (auditory or visual) with 90% accuracy independently increase selective attention.     Visual: GOAL MET 4/6/2023  Auditory: Met x1   Patient will be educated regarding cognitive deficits as applicable to the patient's life for increased awareness and will execute returned demonstration of information with 80% accuracy.               GOAL MET 3/6/2023         Reasons for Recertification of Therapy: Patient continues to present with a cognitive-communication disorder that negatively impacts quality of life, ability to participate in social and community interactions, and ability to communicate an urgent message if needed.   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      Plan     Updated Certification Period: 4/17/2023 to 6/26/2023     Recommended Treatment Plan: Patient will participate in the [facility] rehabilitation program for speech therapy 1-2 times per week to address her Communication and Cognition deficits, to educate patient and their family, and to participate in a home exercise program.     Other recommendations: None at this time.      Therapist's Name:    Becca Blanco, CCC-SLP, CBIS   Speech Language Pathologist   Certified Brain Injury Specialist   4/17/2023      I CERTIFY THE NEED FOR THESE SERVICES  FURNISHED UNDER THIS PLAN OF TREATMENT AND WHILE UNDER MY CARE      Physician Name: _______________________________    Physician Signature: ____________________________

## 2023-04-17 NOTE — PROGRESS NOTES
[facility]   Physical Therapy Treatment Note     Name: Leslie CASTELLON [facility] Number: [id number]    Therapy Diagnosis:   Encounter Diagnosis   Name Primary?    Impaired functional mobility, balance, gait, and endurance Yes       Physician: Sepideh Peters,*    Visit Date: 4/17/2023    Physician Orders: PT Eval and Treat   Medical Diagnosis from Referral: R29.6 (ICD-10-CM) - Falls frequently  Evaluation Date: 2/27/2023  Authorization Period Expiration: 2/17/24  Plan of Care Expiration: 4/28/23  Progress Note Due: 5/6/23  Visit # / Visits authorized: 9/12 (+eval)  FOTO: 0/3 time constraints     Precautions: Standard and Fall     PTA Visit #: 0/5     Time In: 1000  Time Out: 1100  Total Billable Time: 60 minutes    SUBJECTIVE     Pt reports: that she has experienced lower back pain over the last few weeks.  She reports that she was not compliant with Home Exercise Program as she left it in her granddaughter's car.   Response to previous treatment: She responded well to last session.   Functional change: She reports no functional change since last session.     Pain: 0/10  Location: low back     OBJECTIVE     Objective Measures updated at progress report unless specified.     Treatment     Leslie received the treatments listed below:      THERAPEUTIC EXERCISES to develop strength, endurance, ROM, flexibility, posture, and core stabilization for (45) minutes including:    Intervention Performed Today    Supine Stretches  - 3x30 seconds bilateral for each:  - lower trunk rotation   - single knee to chest  - hamstring/ heelcord   Seated Stretches  - 3x30 seconds bilateral for each:  - hamstring/ heelcord  - hip adductor   Standing Stretches  - 3x15 seconds bilateral for each:  - IT band   - heelcord    Seated lower extremity exercises          - hip flexion 2x10 bilateral   - hamstring curl 2x10 bilateral   - dorsiflexion/ plantarflexion 2x10 bilateral with green band  - eversion 1x10  bilateral   - toe curls and flares 1x20 bilateral   - hip adduction against ball 1x10 with 3 second hold  - hip abduction against belt 1x10 with 3 second hold   Standing lower extremity exercises  X  X  X  X - hip flexion 2x10 bilateral   - hip abduction 2x10 bilateral   - hip extension 2x10 bilateral   - heel raise 2x10    NuStep for endurance x - Level 2, 6 minutes   Supine  - Quad set with short arc quad 2x10 with 3 second hold   - dorsiflexion against green band 2x10 bilateral    Lower Trunk Rotations x - 2x10    Pelvic tilts x 3 seconds hold 1x10   Bridges  x 1x10   Abdominal Bracing x Knee to chest - 2x10 Left    Straight leg raise  x - 1x10 bilateral    Neoprene wrap to Right knee for all exercises    neuromuscular re-education activities to improve: Balance, Coordination, Kinesthetic, Sense, Proprioception, and Posture for (0) minutes. The following activities were included:    Intervention Performed Today    Air Ex Beam      - Tandem holds 5 seconds, 3x's   - Tandem walking forward/backward 4x's each  - Side stepping 2x's each direction  - tandem walking over hurdles forward and side stepping 2x's each  - heel lifts and toe lifts off of beam with 2-0 hand supports   - single leg standing while tapping opposite foot forward and back on beam   Orange Rubber Beam    - Tandem holds 3 seconds, 3x's bilateral   - Tandem walking forward 4x's   - Side stepping 2x's each direction  - single leg standing while tapping opposite foot forward and back on beam   Air Ex Pad              - Weight shifts Left to Right 2x10  - Weight shifts forward/back 2x10  - single leg standing holds   - Eyes closed 10 seconds  - backward pivot steps with 1 hand support 1x8 bilateral   - standing on pad while toss/catching ball to wall 1x8  - One lower extremity on pad and opposite lower extremity on step tossing ball to wall    Hand paddled per Neuro LEONIE technique and for entirety of session     Core Exercises in supine  Bilateral  lower extremities over Tball for each:   - lower trunk rotation 2x10  - bilateral knees to chest 2x10  - posterior pelvic tilt to bridge 2x10   Parallel bars      - walking backward 4x8 ft with 2-1 hand support  - braided walking 4x's length of bars  - narrow base standing without hand support performing head turns/nods 1x5 each, arm reach/lifts 1x5 each   Trunk extension for posture  - 2x10 over pillow and ball at low back    Sit to squat without hand support  2x5 from progressively lowered mat   Neoprene wrap to Right knee for all exercises    THERAPEUTIC ACTIVITIES to improve dynamic and functional performance for () minutes including:    Intervention Performed Today    Patient and her granddaughter educated on Home Exercise Program for balance.  Also educated on wearing tennis shoes to future sessions.  Discussed the process of changing schedule to get her seen by an ortho PT 1 day/week then by me 1day/week  - Patient and granddaughter verbalized good understanding of education provided.                                              MANUAL THERAPY TECHNIQUES were applied for (15) minutes, including:    Manual Intervention Performed Today    Soft Tissue Mobilization [] Left lumbar paraspinal   Joint Mobilizations []     []     []    Functional Dry Needling  [x] Left lumbosacral multifidus and paraspinal     Plan for Next Visit: Continue as needed         Gait Training: to improve functional mobility and safety for  () minutes, including:  - working on increasing step length and upright posture with gait sequence, ambulating 200ft and 100ft with contact guard assist for safety awareness.     Patient Education and Home Exercises     Home Exercises Provided and Patient Education Provided     Education provided:   - 3/14/23 Patient educated on Home Exercise Program for balance   - 3/16/23 Patient encouraged to stay compliant with Home Exercise Program   Written Home Exercises Provided: yes. Exercises were reviewed and  Leslie was able to demonstrate them prior to the end of the session.  Leslie verbalized good  understanding of the education provided. See EMR under Patient Instructions for exercises provided during therapy sessions    ASSESSMENT     Patient reports that right knee pain is a bit increased today.  She also continues to report of significant lower back pain today.  The patient asked about potential pain management MD.  Did offer Dr. Augustine as an alternative.  However, it may benefit the patient to return to MD for additional JAMESON.  There is also significant instability at the right knee.  May ask for additional advice on bracing for that condition.  There was also some complaint of pain at the right shoulder region.  The patient may benefit from OT treatment/pain management to address those issues.    Leslie Is progressing well towards her goals.   Pt prognosis is Good.     Pt will continue to benefit from skilled outpatient physical therapy to address the deficits listed in the problem list box on initial evaluation, provide pt/family education and to maximize pt's level of independence in the home and community environment.     Pt's spiritual, cultural and educational needs considered and pt agreeable to plan of care and goals.     Anticipated barriers to physical therapy: co-morbidities, sedentary lifestyle, chronicity of condition, and lack of understanding of condition     GOALS:     Short Term Goals:  4 weeks Progress    Function: Patient will demonstrate improved function as indicated by a functional limitation score improved by 3 points from initial measure.  PC   Strength: Patient will demonstrate improved strength by performing 5x sit to  20 seconds in order to progress towards independence with functional activities.  PC   Balance: Complete Encarnacion Balance Scale and set goals accordingly.  PC   Coordination: Patient will demonstrate improved time of 13 sec on TUG to demonstrate improved coordination and  safety with mobility. PC   HEP: Patient will demonstrate independence with HEP in order to progress toward functional independence.  Met   Determine the need of Custom Wheelchair Eval to improve patient's positioning and independence with pressure reliefs and independence with home and community mobility to decrease burden of care. D/C not needed at this time.              Long Term Goals:  8 weeks Progress   Function: Patient will demonstrate improved function as indicated by a functional limitation score improved 5 points from initial measure.  PC   Strength: Patient will demonstrate improved strength by performing 5x sit to  17 seconds in order to progress towards independence with functional activities.  PC   Patient will return to ADL's, IADL's, community involvement, recreational activities, and work-related activities with less than or equal to 5/10 pain and maximal function.  PC   Balance: Complete Encarnacion Balance Scale and set goals accordingly.  PC   Coordination: Patient will demonstrate improved time of 11 sec on TUG to demonstrate improved coordination and safety with mobility. PC   HEP: Patient educated on HEP in preparation for d/c verbalizing and demonstrating good understanding of topics discussed.   PC            Goals Key:  PC= progressing/continue;        PM= partially met;             DC= discontinue    PLAN     Continue Plan of Care (POC) and progress per patient tolerance. See Treatment section for exercise progression.    Leoncio Lujan, PT

## 2023-04-17 NOTE — PROGRESS NOTES
[facility]  Speech Therapy Treatment Note- Neurological Rehabilitation    Date: 4/17/2023     Name: Leslie Wood   MRN: [id number]   Therapy Diagnosis:   Encounter Diagnoses   Name Primary?    Aphasia Yes    Cognitive communication disorder      Physician: Sammy Juarez MD  Physician Orders: [id number] - AMB REFERRAL/CONSULT TO SPEECH THERAPY  Medical Diagnosis: R47.01 (ICD-10-CM) - Aphasia    Visit #/ Visits Authorized: 11/20  Date of Evaluation:  2/17/2023   Insurance Authorization Period: 2/23/2023 - 12/31/2023   Plan of Care Expiration Date:  4/12/2023   Extended Plan of Care:  NA   Progress Note: See updated POC  Visits Cancelled: 0  Visits No Show: 0    Time In:  10:50 AM   Time Out: 11:32 AM   Total Billable Time: 42 minutes      Precautions: Standard  Subjective:   Patient reports: Denies significant change since previous appointment.   She was compliant to home exercise program.     Response to previous treatment: Continued progress towards visual selective attention.     Pain Scale:  0/10 on a Visual Analog Scale currently.   Pain Location:  N/A  Objective:   TIMED  Procedure Min.   Cognitive Therapeutic Interventions, first 15 minutes CPT 04530  15   Cognitive Therapeutic Interventions, each additional 15 minutes CPT 29612  27         UNTIMED  Procedure Min.   Speech- Language- Voice Therapy  0     Total Timed Units: 3  Total Untimed Units: 0  Charges Billed/Number of units: 61952/1; 96642/2    Short Term Goals: (6 weeks) Current Progress:   Patient will describe functional objects/images to facilitate a strategy of description in order to increase transfer of intended message with 80% accuracy and moderate clinician cueing.     Progressing/ Not Met 4/17/2023   Not formally addressed.     Patient will complete word finding task (i.e. Creating subject, verb, object pairs in VNEST protocol) with 90% accuracy moderate assistance to improve word fluency.     Met x2 previously     Progressing/ Not Met  4/17/2023   Not formally addressed     Patient will utilize word finding strategies in structured tasks with 70% accuracy and minimal cues.      Progressing/ Not Met 4/17/2023   Not formally addressed.    Patient will summarize read material to improve word fluency, word finding, and reading comprehension in Attentive Reading and Constrained Summarization (ARCS) protocol with 80% accuracy and minimal verbal assistance across 2-3 sessions.     Progressing/ Not Met 4/17/2023   Not formally addressed.      Patient will write 7-10 word sentences with no more than 2 syntactic/grammatical errors given minimal cueing.      Progressing/ Not Met 4/17/2023   Not formally addressed.      Patient will complete functional writing task with complete, coherent, and accurately spelled responses with 80% accuracy and minimal verbal and visual assistance across 3-5 sessions.     Progressing/ Not Met 4/17/2023   Not formally addressed.      Patient will complete RBANS assessment to further assess cognitive communication skills.        GOAL MET 2/27/2023  Completed today. See below for results.       GOAL MET 2/27/2023    Patient will sustain attention to a moderate task (auditory or visual) for 2 minutes with 90% accuracy or no more than 1 redirection.     Visual: GOAL MET 3/20/2023  Auditory: GOAL MET 3/20/2023  VISUAL:   Task 1: Visual sustained attention task completed in which patient visually scanned an article looking for certain letters. She completed this task with 87% accuracy independently (36/41). Clinician declan a purple line on the left hand side to serve an anchor. Patient utilized a blank paper to keep track of which line she was working on.     Task 2: Visual sustained attention task completed in which patient visually scanned a phone bill looking for unexplained calls. She completed this task with 90% accuracy given minimal cueing. The one error that she made was due to skipping over an area code that was more offset  to the left than the others. Clinician and patient dicussed left neglect in depth that is likely due to her first cerebral vascular accident (right cerebral vascular accident).     AUDITORY:  Task 1: Constant Therapy: Understanding Voicemails was completed today with 90% accuracy independently (improvement from last session with 70% accuracy). She required minimal assistance for navigating the application (pressing the next button, replaying the voicemail)    GOAL MET 3/20/2023    Patient will complete selective attention tasks (auditory or visual) with 90% accuracy independently increase selective attention.    Visual: GOAL MET 4/6/2023  Auditory: Met x1 Visual:   Medication management task. Patient was asked to sort three medications into a weekly pill box using the instructions provided on each pill bottle. She completed this task with 92% accuracy (correctly sorted 26 out of 28 individual pills) independently. Clinician and patient discussed the two missed pills and potential negative consequences of missing medication.     Auditory:  Not formally addressed.      Patient will use attention shifting strategies to shift attention between two tasks (auditory or visual) with no more than 3 cues or 90% accuracy to improve alternating attention. Not formally addressed.    Patient will complete short term recall tasks after a 5 minutes delay with 90% accuracy  independently  with use of memory strategies to improve recall of information and generalization of memory strategies. Not formally addressed.    Patient will use Goal Plan Action Review strategy to complete moderate to complex reasoning, planning, or organization tasks with 90% accuracy independently to improve functional executive function skills. Not formally addressed.    Patient will complete a functional task to improve attention, memory and/or executive functions (I.e. sample bill paying activity, recipe, or multiple choice comprehension questions to 1  paragraphs) with 80% accuracy and natural environment noise distractions (TV news background, music, etc.). Not formally addressed.    Patient will be educated regarding cognitive deficits as applicable to the patient's life for increased awareness and will execute returned demonstration of information with 80% accuracy.           GOAL MET 3/6/2023  Education provided regarding results from her assessment, the hierarchy of neuropsychological functions, and the role of attention in other executive functions. Patient and granddaughter verbalized understanding.     GOAL MET 3/6/2023    Patient will independently generate strategies to improve ability to complete tasks with enhanced accuracy and time, based on review of objective previous performance on trials of functional tasks with 80% accuracy.  Not formally addressed.    Given implementation of strategies, patient will complete a task following initial attempt with 90% accuracy and reported reduced number on the relative scale of cognitive load when compared to previous trial.  Not formally addressed.    Patient will use external memory strategies in order to recall important dates, events, appointments, or tasks to be completed with 90% accuracy independently.  Not formally addressed.      Patient participated in re-assessment today. Results of the RICE-3 are below. See updated plan of care for comparison from initial evaluation.     Rehabilitation [facility] ([facility]) Evaluation of Communication Problems in Right Hemisphere Dysfunction-3rd Edition (RICE-3)  The RICE-3 includes 5 subtests evaluating right hemisphere cognitive-communication deficits that have clinical relevance to rehabilitation including behavior observation profile, pragmatic skills, narrative discourse, visual scanning and tracking, assessment of writing, and metaphorical language. The Administration Manual details administration, scoring and interpretation of results and provides severity  ratings for each subtest.     Subtest Score Severity   Behavioral Observational Profile 23/24 Normal   Rating Scale of Pragmatic Communication Skills 34/40 Mild   Visual Scanning and Tracking-Accuracy 13 Mild   Visual Scanning and Tracking-Rate 503 Moderate   Assessment and Analysis of Writing  17/24 Moderate   Metaphorical Language Test  13/30 Moderate     Patient's performance on the RICE-3 was significant for improvements from initial evaluation in visual scanning accuracy. She continues to present with deficits in writing. Writing score was significantly impacted by the patient's inability to write at least 50 words during the spontaneous writing sample.     Patient Education/Response:   Patient and her granddaughter were educated on the cognitive-communication strategies and word finding strategies. They verbalized understanding of all discussed.     Home program established: yes-Patient provided word finding strategy handout  Exercises were reviewed and Leslie was able to demonstrate them prior to the end of the session.  Leslie demonstrated good  understanding of the education provided.     See Electronic Medical Record under Patient Instructions for exercises provided throughout therapy.  Assessment:   Patient is very motivated to improve and participates in all therapy tasks presented. In today's session, she demonstrated significant difficulty with attending to auditory attention/voicemails task, demonstrating reduced thought organization, confabulation in moments of reduced attention with frustration with deficits further impacting overall attention. She will benefit from continued speech therapy to address the above deficits and to utilize strategies as needed.     Leslie is progressing well towards her goals.Current goals remain appropriate. Goals will be updated as needed.     Patient prognosis is Good. Patient will continue to benefit from skilled outpatient speech and language therapy to address the  deficits listed in the problem list on initial evaluation, provide patient/family education and to maximize patient's level of independence in the home and community environment.     Medical necessity is demonstrated by the following IMPAIRMENTS:  Language deficits impact overall quality of life, ability to participation in social and community interactions, and the ability to communicate important medical information if needed.   Cognitive-communication deficits impact overall quality of life and the ability to safely and independently complete activities of daily living.     Barriers to Therapy: none   Patient's spiritual, cultural and educational needs considered and patient agreeable to plan of care and goals.  Plan:   Continue Plan of Care with focus on sustained/selective attention for functional daily tasks.     Becca Blanco, CCC-SLP, CBIS   Speech Language Pathologist   Certified Brain Injury Specialist   4/17/2023

## 2023-04-20 ENCOUNTER — TELEPHONE (OUTPATIENT)
Dept: INTERNAL MEDICINE | Facility: CLINIC | Age: 83
End: 2023-04-20
Payer: MEDICARE

## 2023-04-20 ENCOUNTER — OFFICE VISIT (OUTPATIENT)
Dept: OPHTHALMOLOGY | Facility: CLINIC | Age: 83
End: 2023-04-20
Payer: MEDICARE

## 2023-04-20 ENCOUNTER — CLINICAL SUPPORT (OUTPATIENT)
Dept: REHABILITATION | Facility: HOSPITAL | Age: 83
End: 2023-04-20
Payer: MEDICARE

## 2023-04-20 DIAGNOSIS — R29.898 POOR FINE MOTOR SKILLS: Primary | ICD-10-CM

## 2023-04-20 DIAGNOSIS — Z74.09 IMPAIRED FUNCTIONAL MOBILITY, BALANCE, GAIT, AND ENDURANCE: Primary | ICD-10-CM

## 2023-04-20 DIAGNOSIS — E11.9 TYPE 2 DIABETES MELLITUS WITHOUT COMPLICATION, WITHOUT LONG-TERM CURRENT USE OF INSULIN: Primary | ICD-10-CM

## 2023-04-20 DIAGNOSIS — H04.129 DRY EYE: ICD-10-CM

## 2023-04-20 DIAGNOSIS — H52.13 MYOPIA WITH ASTIGMATISM AND PRESBYOPIA, BILATERAL: ICD-10-CM

## 2023-04-20 DIAGNOSIS — H53.47 HEMIANOPSIA: ICD-10-CM

## 2023-04-20 DIAGNOSIS — H52.203 MYOPIA WITH ASTIGMATISM AND PRESBYOPIA, BILATERAL: ICD-10-CM

## 2023-04-20 DIAGNOSIS — R41.841 COGNITIVE COMMUNICATION DISORDER: ICD-10-CM

## 2023-04-20 DIAGNOSIS — H52.4 MYOPIA WITH ASTIGMATISM AND PRESBYOPIA, BILATERAL: ICD-10-CM

## 2023-04-20 DIAGNOSIS — R47.01 APHASIA: Primary | ICD-10-CM

## 2023-04-20 DIAGNOSIS — Z96.1 PSEUDOPHAKIA OF BOTH EYES: ICD-10-CM

## 2023-04-20 DIAGNOSIS — G45.9 TIA (TRANSIENT ISCHEMIC ATTACK): ICD-10-CM

## 2023-04-20 PROCEDURE — 97130 THER IVNTJ EA ADDL 15 MIN: CPT

## 2023-04-20 PROCEDURE — 97110 THERAPEUTIC EXERCISES: CPT

## 2023-04-20 PROCEDURE — 92004 PR EYE EXAM, NEW PATIENT,COMPREHESV: ICD-10-PCS | Mod: S$PBB,,, | Performed by: OPTOMETRIST

## 2023-04-20 PROCEDURE — 92015 PR REFRACTION: ICD-10-PCS | Mod: ,,, | Performed by: OPTOMETRIST

## 2023-04-20 PROCEDURE — 92015 DETERMINE REFRACTIVE STATE: CPT | Mod: ,,, | Performed by: OPTOMETRIST

## 2023-04-20 PROCEDURE — 99999 PR PBB SHADOW E&M-EST. PATIENT-LVL III: CPT | Mod: PBBFAC,,, | Performed by: OPTOMETRIST

## 2023-04-20 PROCEDURE — 92004 COMPRE OPH EXAM NEW PT 1/>: CPT | Mod: S$PBB,,, | Performed by: OPTOMETRIST

## 2023-04-20 PROCEDURE — 99999 PR PBB SHADOW E&M-EST. PATIENT-LVL III: ICD-10-PCS | Mod: PBBFAC,,, | Performed by: OPTOMETRIST

## 2023-04-20 PROCEDURE — 97112 NEUROMUSCULAR REEDUCATION: CPT

## 2023-04-20 PROCEDURE — 99213 OFFICE O/P EST LOW 20 MIN: CPT | Mod: PBBFAC | Performed by: OPTOMETRIST

## 2023-04-20 PROCEDURE — 97129 THER IVNTJ 1ST 15 MIN: CPT

## 2023-04-20 NOTE — PROGRESS NOTES
[facility]   Physical Therapy Treatment Note     Name: Leslie CASTELLON [facility] Number: [id number]    Therapy Diagnosis:   Encounter Diagnosis   Name Primary?    Impaired functional mobility, balance, gait, and endurance Yes       Physician: Sepideh Peters,*    Visit Date: 4/20/2023    Physician Orders: PT Eval and Treat   Medical Diagnosis from Referral: R29.6 (ICD-10-CM) - Falls frequently  Evaluation Date: 2/27/2023  Authorization Period Expiration: 2/17/24  Plan of Care Expiration: 4/28/23  Progress Note Due: 5/6/23  Visit # / Visits authorized: 10/12 (+eval)  FOTO: 0/3 time constraints     Precautions: Standard and Fall     PTA Visit #: 0/5     Time In: 0941  Time Out: 1019  Total Billable Time: 38 minutes    SUBJECTIVE     Pt reports: Patient's granddaughter reports that they were confused on appointment time today and that is why she was running late and has an eye appointment so unable to stay to make up time  She reports that she was not compliant with Home Exercise Program as she left it in her granddaughter's car.   Response to previous treatment: She responded well to last session.   Functional change: She reports no functional change since last session.     Pain: 7/10  Location: right knee    OBJECTIVE     Objective Measures updated at progress report unless specified.     Treatment     Leslie received the treatments listed below:      THERAPEUTIC EXERCISES to develop strength, endurance, ROM, flexibility, posture, and core stabilization for (28) minutes including:    Intervention Performed Today    Supine Stretches  - 3x30 seconds bilateral for each:  - lower trunk rotation   - single knee to chest  - hamstring/ heelcord   Seated Stretches  - 3x30 seconds bilateral for each:  - hamstring/ heelcord  - hip adductor   Standing Stretches  - 3x15 seconds bilateral for each:  - IT band   - heelcord    Seated lower extremity exercises          - hip flexion 2x10 bilateral   -  hamstring curl 2x10 bilateral   - dorsiflexion/ plantarflexion 2x10 bilateral with green band  - eversion 1x10 bilateral   - toe curls and flares 1x20 bilateral   - hip adduction against ball 1x10 with 3 second hold  - hip abduction against belt 1x10 with 3 second hold   Standing lower extremity exercises  X  X  X  X - hip flexion 1x10 bilateral   - hip abduction 1x10 bilateral   - hip extension 1x10 bilateral   - heel raise 2x10    NuStep for endurance x - Level 2, 5 minutes   Supine  - Quad set with short arc quad 2x10 with 3 second hold   - dorsiflexion against green band 2x10 bilateral    Lower Trunk Rotations x - 2x10    Pelvic tilts x 3 seconds hold 1x10   Bridges  x 1x10   Abdominal Bracing x Knee to chest - 1x10 bilateral    Straight leg raise  x - 1x10 bilateral    Neoprene wrap to Right knee for all exercises    neuromuscular re-education activities to improve: Balance, Coordination, Kinesthetic, Sense, Proprioception, and Posture for (10) minutes. The following activities were included:    Intervention Performed Today    Air Ex Beam   X  X     - Tandem holds 5 seconds, 3x's   - Tandem walking forward/backward 3x's each  - Side stepping 3x's each direction  - tandem walking over hurdles forward and side stepping 2x's each  - heel lifts and toe lifts off of beam with 2-0 hand supports   - single leg standing while tapping opposite foot forward and back on beam   Orange Rubber Beam    - Tandem holds 3 seconds, 3x's bilateral   - Tandem walking forward 4x's   - Side stepping 2x's each direction  - single leg standing while tapping opposite foot forward and back on beam   Air Ex Pad         x     - Weight shifts Left to Right 2x10  - Weight shifts forward/back 2x10  - single leg standing holds   - Eyes closed 10 seconds  - backward pivot steps with 1 hand support 1x8 bilateral   - standing on pad while toss/catching ball to wall 1x8  - One lower extremity on pad and opposite lower extremity on step tossing  ball to wall    Hand paddled per Neuro LEONIE technique and for entirety of session     Core Exercises in supine  Bilateral lower extremities over Tball for each:   - lower trunk rotation 2x10  - bilateral knees to chest 2x10  - posterior pelvic tilt to bridge 2x10   Parallel bars      - walking backward 4x8 ft with 2-1 hand support  - braided walking 4x's length of bars  - narrow base standing without hand support performing head turns/nods 1x5 each, arm reach/lifts 1x5 each   Trunk extension for posture  - 2x10 over pillow and ball at low back    Sit to squat without hand support  2x5 from progressively lowered mat       THERAPEUTIC ACTIVITIES to improve dynamic and functional performance for () minutes including:    Intervention Performed Today    Patient and her granddaughter educated on Home Exercise Program for balance.  Also educated on wearing tennis shoes to future sessions.  Discussed the process of changing schedule to get her seen by an ortho PT 1 day/week then by me 1day/week  - Patient and granddaughter verbalized good understanding of education provided.                                              MANUAL THERAPY TECHNIQUES were applied for (15) minutes, including:    Manual Intervention Performed Today    Soft Tissue Mobilization [] Left lumbar paraspinal   Joint Mobilizations []     []     []    Functional Dry Needling  [x] Left lumbosacral multifidus and paraspinal     Plan for Next Visit: Continue as needed         Gait Training: to improve functional mobility and safety for  () minutes, including:  - working on increasing step length and upright posture with gait sequence, ambulating 200ft and 100ft with contact guard assist for safety awareness.     Patient Education and Home Exercises     Home Exercises Provided and Patient Education Provided     Education provided:   - 3/14/23 Patient educated on Home Exercise Program for balance   - 3/16/23 Patient encouraged to stay compliant with Home  Exercise Program   Written Home Exercises Provided: yes. Exercises were reviewed and Leslie was able to demonstrate them prior to the end of the session.  Leslie verbalized good  understanding of the education provided. See EMR under Patient Instructions for exercises provided during therapy sessions    ASSESSMENT     Patient reports that right knee pain is a bit increased today.  She also continues to report of significant lower back pain today.  The patient asked about potential pain management MD.  Did offer Dr. Augustine as an alternative.  However, it may benefit the patient to return to MD for additional JAMESON.  There is also significant instability at the right knee.  May ask for additional advice on bracing for that condition.  There was also some complaint of pain at the right shoulder region.  The patient may benefit from OT treatment/pain management to address those issues.    Leslie Is progressing well towards her goals.   Pt prognosis is Good.     Pt will continue to benefit from skilled outpatient physical therapy to address the deficits listed in the problem list box on initial evaluation, provide pt/family education and to maximize pt's level of independence in the home and community environment.     Pt's spiritual, cultural and educational needs considered and pt agreeable to plan of care and goals.     Anticipated barriers to physical therapy: co-morbidities, sedentary lifestyle, chronicity of condition, and lack of understanding of condition     GOALS:     Short Term Goals:  4 weeks Progress    Function: Patient will demonstrate improved function as indicated by a functional limitation score improved by 3 points from initial measure.  PC   Strength: Patient will demonstrate improved strength by performing 5x sit to  20 seconds in order to progress towards independence with functional activities.  PC   Balance: Complete Encarnacion Balance Scale and set goals accordingly.  PC   Coordination: Patient will  demonstrate improved time of 13 sec on TUG to demonstrate improved coordination and safety with mobility. PC   HEP: Patient will demonstrate independence with HEP in order to progress toward functional independence.  Met   Determine the need of Custom Wheelchair Eval to improve patient's positioning and independence with pressure reliefs and independence with home and community mobility to decrease burden of care. D/C not needed at this time.              Long Term Goals:  8 weeks Progress   Function: Patient will demonstrate improved function as indicated by a functional limitation score improved 5 points from initial measure.  PC   Strength: Patient will demonstrate improved strength by performing 5x sit to  17 seconds in order to progress towards independence with functional activities.  PC   Patient will return to ADL's, IADL's, community involvement, recreational activities, and work-related activities with less than or equal to 5/10 pain and maximal function.  PC   Balance: Complete Encarnacion Balance Scale and set goals accordingly.  PC   Coordination: Patient will demonstrate improved time of 11 sec on TUG to demonstrate improved coordination and safety with mobility. PC   HEP: Patient educated on HEP in preparation for d/c verbalizing and demonstrating good understanding of topics discussed.   PC            Goals Key:  PC= progressing/continue;        PM= partially met;             DC= discontinue    PLAN     Continue Plan of Care (POC) and progress per patient tolerance. See Treatment section for exercise progression.    Eugenie Stone, PT

## 2023-04-20 NOTE — PROGRESS NOTES
HPI     Diabetic Eye Exam            Comments: Diagnosed with diabetes a long time ago  Lab Results       Component                Value               Date                       HGBA1C                   5.8 (H)             02/01/2023            Vision changes since last eye exam?: no  Any eye pain today: no  Other ocular symptoms: no  Interested in contact lens fitting today? No  Family history of glaucoma and macular degeneration in sister   Stroke August 2020, no peripheral VA in OS   TIA TIA December 2022  PC IOL more than 8 years ago          Last edited by Ivon Delatorre MA on 4/20/2023 10:44 AM.            Assessment /Plan     For exam results, see Encounter Report.    Type 2 diabetes mellitus without complication, without long-term current use of insulin  -     Ambulatory referral/consult to Ophthalmology  There was no diabetic retinopathy present in either eye today.   Recommended that pt continue care with PCP and/or specialists regarding diabetes.  Follow-up dilated eye exam recommended in 12 months, sooner with any vision changes or new concerns.    Pseudophakia of both eyes  Stable OU  Monitor 12 months    TIA (transient ischemic attack)  Hemianopsia  Following stroke 2 years ago  Thinning on gOCT corresponds with hemianopsia (CF)  Will have pt rtc for baseline VF          Dry eye  Recommended iVizia bid OU    Myopia with astigmatism and presbyopia, bilateral    Eyeglass Final Rx       Eyeglass Final Rx         Sphere Cylinder Axis Add    Right -1.25 +1.50 010 +2.50    Left -2.75 +2.25 180 +2.50      Expiration Date: 4/20/2024                    RTC next available for 24-2VF and review or PRN  Discussed above and all questions were answered.

## 2023-04-20 NOTE — TELEPHONE ENCOUNTER
----- Message from LISA Roman sent at 4/20/2023  1:01 PM CDT -----  Regarding: Occupational Therapy Referral Request  Dr. Carson,     Ms. Nelson has been complaining of difficulty with her hands and fine motor tasks such as writing. I believe she would benefit from an evaluation with occupational therapy. If you agree, can  you please place an order for occupational therapy?     Thank you,     LISA Roman, IS   Speech Language Pathologist   Certified Brain Injury Specialist   4/20/2023

## 2023-04-20 NOTE — PROGRESS NOTES
[facility]  Speech Therapy Treatment Note- Neurological Rehabilitation    Date: 4/20/2023     Name: Leslie Wood   MRN: [id number]   Therapy Diagnosis:   Encounter Diagnoses   Name Primary?    Aphasia Yes    Cognitive communication disorder      Physician: Sammy Juarez MD  Physician Orders: [id number] - AMB REFERRAL/CONSULT TO SPEECH THERAPY  Medical Diagnosis: R47.01 (ICD-10-CM) - Aphasia    Visit #/ Visits Authorized: 11/20  Date of Evaluation:  2/17/2023   Insurance Authorization Period: 2/23/2023 - 12/31/2023   Plan of Care Expiration Date: 6/26/2023   Extended Plan of Care:  NA   Progress Note: 5/18/2023  Visits Cancelled: 0  Visits No Show: 0    Time In:  11:33 AM   Time Out: 12:33 PM60 minutes      Precautions: Standard  Subjective:   Patient reports: Denies significant change since previous appointment.   She was compliant to home exercise program.     Response to previous treatment: Continued progress towards visual selective attention.     Pain Scale:  0/10 on a Visual Analog Scale currently.   Pain Location:  N/A  Objective:   TIMED  Procedure Min.   Cognitive Therapeutic Interventions, first 15 minutes CPT 12729  15   Cognitive Therapeutic Interventions, each additional 15 minutes CPT 71251  45         UNTIMED  Procedure Min.   Speech- Language- Voice Therapy  0     Total Timed Units: 4  Total Untimed Units: 0  Charges Billed/Number of units: 87942/1; 66558/3    Short Term Goals: (6 weeks) Current Progress:   Patient will describe functional objects/images to facilitate a strategy of description in order to increase transfer of intended message with 80% accuracy and moderate clinician cueing.     Progressing/ Not Met 4/20/2023   Not formally addressed.     Patient will complete word finding task (i.e. Creating subject, verb, object pairs in VNEST protocol) with 90% accuracy moderate assistance to improve word fluency.     Met x2 previously     Progressing/ Not Met 4/20/2023   Not formally  addressed     Patient will utilize word finding strategies in structured tasks with 70% accuracy and minimal cues.      Progressing/ Not Met 4/20/2023   Not formally addressed.    Patient will summarize read material to improve word fluency, word finding, and reading comprehension in Attentive Reading and Constrained Summarization (ARCS) protocol with 80% accuracy and minimal verbal assistance across 2-3 sessions.     Progressing/ Not Met 4/20/2023   Not formally addressed.      Patient will write 7-10 word sentences with no more than 2 syntactic/grammatical errors given minimal cueing.      Progressing/ Not Met 4/20/2023   Not formally addressed.      Patient will complete functional writing task with complete, coherent, and accurately spelled responses with 80% accuracy and minimal verbal and visual assistance across 3-5 sessions.     Progressing/ Not Met 4/20/2023   Not formally addressed.      Patient will complete RBANS assessment to further assess cognitive communication skills.        GOAL MET 2/27/2023  Completed today. See below for results.       GOAL MET 2/27/2023    Patient will sustain attention to a moderate task (auditory or visual) for 2 minutes with 90% accuracy or no more than 1 redirection.     Visual: GOAL MET 3/20/2023  Auditory: GOAL MET 3/20/2023  VISUAL:   Task 1: Visual sustained attention task completed in which patient visually scanned an article looking for certain letters. She completed this task with 87% accuracy independently (36/41). Clinician declan a purple line on the left hand side to serve an anchor. Patient utilized a blank paper to keep track of which line she was working on.     Task 2: Visual sustained attention task completed in which patient visually scanned a phone bill looking for unexplained calls. She completed this task with 90% accuracy given minimal cueing. The one error that she made was due to skipping over an area code that was more offset to the left than the  others. Clinician and patient dicussed left neglect in depth that is likely due to her first cerebral vascular accident (right cerebral vascular accident).     AUDITORY:  Task 1: Constant Therapy: Understanding Voicemails was completed today with 90% accuracy independently (improvement from last session with 70% accuracy). She required minimal assistance for navigating the application (pressing the next button, replaying the voicemail)    GOAL MET 3/20/2023    Patient will complete selective attention tasks (auditory or visual) with 90% accuracy independently increase selective attention.    Visual: GOAL MET 4/6/2023  Auditory: Met x1 Visual:   Medication management task. Patient was asked to sort three medications into a weekly pill box using the instructions provided on each pill bottle. She completed this task with 92% accuracy (correctly sorted 26 out of 28 individual pills) independently. Clinician and patient discussed the two missed pills and potential negative consequences of missing medication.     Auditory:  Patient completed auditory selective attention task of immediate recall of four words read aloud with natural background noise. Clinician and patient practiced visualization and patient was able to create scenarios utilizing all four words. She then recalled words with 75% accuracy given minimal cueing to use strategy.     Patient completed auditory selective attention task of immediate recall of short stories (2 sentences). She was asked to repeat the stories then answer questions. She began this task with ~50% accuracy. However, given consistent cueing to use visualization strategy, she was able to steadily improve during this task repeating stories with ~80% accuracy and answering questions with 95% accuracy.      Patient will use attention shifting strategies to shift attention between two tasks (auditory or visual) with no more than 3 cues or 90% accuracy to improve alternating attention. Not  formally addressed.    Patient will complete short term recall tasks after a 5 minutes delay with 90% accuracy  independently  with use of memory strategies to improve recall of information and generalization of memory strategies. Clinician introduced WRAP strategies (write it down, repeat it, associate it, picture it). Using the picture it strategy, patient was able to recall short stories (3-4 sentences) after a 5 minute delay with 100% accuracy.    Patient will use Goal Plan Action Review strategy to complete moderate to complex reasoning, planning, or organization tasks with 90% accuracy independently to improve functional executive function skills. Not formally addressed.    Patient will complete a functional task to improve attention, memory and/or executive functions (I.e. sample bill paying activity, recipe, or multiple choice comprehension questions to 1 paragraphs) with 80% accuracy and natural environment noise distractions (TV news background, music, etc.). Not formally addressed.    Patient will be educated regarding cognitive deficits as applicable to the patient's life for increased awareness and will execute returned demonstration of information with 80% accuracy.           GOAL MET 3/6/2023  Education provided regarding results from her assessment, the hierarchy of neuropsychological functions, and the role of attention in other executive functions. Patient and granddaughter verbalized understanding.     GOAL MET 3/6/2023    Patient will independently generate strategies to improve ability to complete tasks with enhanced accuracy and time, based on review of objective previous performance on trials of functional tasks with 80% accuracy.  Not formally addressed.    Given implementation of strategies, patient will complete a task following initial attempt with 90% accuracy and reported reduced number on the relative scale of cognitive load when compared to previous trial.  Not formally addressed.     Patient will use external memory strategies in order to recall important dates, events, appointments, or tasks to be completed with 90% accuracy independently.  Not formally addressed.        Patient Education/Response:   Patient and her granddaughter were educated on results from re-assessment. WRAP strategies discussed today. They verbalized understanding of all discussed.     Home program established: yes-Patient given short stories and one visual scene to continue practicing visualization.   Exercises were reviewed and Leslie was able to demonstrate them prior to the end of the session.  Leslie demonstrated good  understanding of the education provided.     See Electronic Medical Record under Patient Instructions for exercises provided throughout therapy.  Assessment:   Patient is very motivated to improve and participates in all therapy tasks presented. Steady improvements observed over the course of today's session with immediate recall of auditory stimuli. She will benefit from continued speech therapy to address the above deficits and to utilize strategies as needed.     Leslie is progressing well towards her goals.Current goals remain appropriate. Goals will be updated as needed.     Patient prognosis is Good. Patient will continue to benefit from skilled outpatient speech and language therapy to address the deficits listed in the problem list on initial evaluation, provide patient/family education and to maximize patient's level of independence in the home and community environment.     Medical necessity is demonstrated by the following IMPAIRMENTS:  Language deficits impact overall quality of life, ability to participation in social and community interactions, and the ability to communicate important medical information if needed.   Cognitive-communication deficits impact overall quality of life and the ability to safely and independently complete activities of daily living.     Barriers to Therapy: none    Patient's spiritual, cultural and educational needs considered and patient agreeable to plan of care and goals.  Plan:   Continue Plan of Care with focus on sustained/selective attention for functional daily tasks.     Becca Blanco, CCC-SLP, CBIS   Speech Language Pathologist   Certified Brain Injury Specialist   4/20/2023

## 2023-04-24 ENCOUNTER — CLINICAL SUPPORT (OUTPATIENT)
Dept: REHABILITATION | Facility: HOSPITAL | Age: 83
End: 2023-04-24
Payer: MEDICARE

## 2023-04-24 ENCOUNTER — TELEPHONE (OUTPATIENT)
Dept: PAIN MEDICINE | Facility: CLINIC | Age: 83
End: 2023-04-24
Payer: MEDICARE

## 2023-04-24 DIAGNOSIS — Z74.09 IMPAIRED FUNCTIONAL MOBILITY, BALANCE, GAIT, AND ENDURANCE: Primary | ICD-10-CM

## 2023-04-24 DIAGNOSIS — R47.01 APHASIA: Primary | ICD-10-CM

## 2023-04-24 DIAGNOSIS — R41.841 COGNITIVE COMMUNICATION DISORDER: ICD-10-CM

## 2023-04-24 PROCEDURE — 97110 THERAPEUTIC EXERCISES: CPT

## 2023-04-24 PROCEDURE — 97140 MANUAL THERAPY 1/> REGIONS: CPT

## 2023-04-24 PROCEDURE — 97129 THER IVNTJ 1ST 15 MIN: CPT

## 2023-04-24 PROCEDURE — 97130 THER IVNTJ EA ADDL 15 MIN: CPT

## 2023-04-24 PROCEDURE — 97112 NEUROMUSCULAR REEDUCATION: CPT

## 2023-04-24 NOTE — PROGRESS NOTES
[facility]   Physical Therapy Treatment Note     Name: Leslie CASTELLON [facility] Number: [id number]    Therapy Diagnosis:   No diagnosis found.      Physician: Sepideh Peters,*    Visit Date: 4/24/2023    Physician Orders: PT Eval and Treat   Medical Diagnosis from Referral: R29.6 (ICD-10-CM) - Falls frequently  Evaluation Date: 2/27/2023  Authorization Period Expiration: 2/17/24  Plan of Care Expiration: 4/28/23  Progress Note Due: 5/6/23  Visit # / Visits authorized: 11/12 (+eval)  FOTO: 0/3 time constraints     Precautions: Standard and Fall     PTA Visit #: 0/5     Time In: 1000  Time Out: 1100  Total Billable Time: 60 minutes    SUBJECTIVE     Pt reports: Patient reports that she feels as though she is improving.  She has started using a standard cane with supervision and reports that she feels as though she is walking more upright.  She does report of pain relief at the lower back region.  She also reports that she is wearing a knee brace at the right knee which also helps to decrease pain.  She reports that she was not compliant with Home Exercise Program as she left it in her granddaughter's car.   Response to previous treatment: She responded well to last session.   Functional change: She reports no functional change since last session.     Pain: 4/10  Location: right knee and lower back    OBJECTIVE     Objective Measures updated at progress report unless specified.     Treatment     Leslie received the treatments listed below:      THERAPEUTIC EXERCISES to develop strength, endurance, ROM, flexibility, posture, and core stabilization for (30) minutes including:    Intervention Performed Today    Supine Stretches  - 3x30 seconds bilateral for each:  - lower trunk rotation   - single knee to chest  - hamstring/ heelcord   Seated Stretches  - 3x30 seconds bilateral for each:  - hamstring/ heelcord  - hip adductor   Standing Stretches  - 3x15 seconds bilateral for each:  - IT band    - heelcord    Seated lower extremity exercises          - hip flexion 2x10 bilateral   - hamstring curl 2x10 bilateral   - dorsiflexion/ plantarflexion 2x10 bilateral with green band  - eversion 1x10 bilateral   - toe curls and flares 1x20 bilateral   - hip adduction against ball 1x10 with 3 second hold  - hip abduction against belt 1x10 with 3 second hold   Standing lower extremity exercises  X  X  X  X - hip flexion 1x10 bilateral   - hip abduction 1x10 bilateral   - hip extension 1x10 bilateral   - heel raise 2x10    NuStep for endurance  - Level 2, 5 minutes   Bicycle for endurance and ROM x - Level 1 x 6 minutes   Supine  - Quad set with short arc quad 2x10 with 3 second hold   - dorsiflexion against green band 2x10 bilateral    Lower Trunk Rotations x - 2x10    Pelvic tilts x 3 seconds hold 1x10   Bridges  x 1x10   Abdominal Bracing x Knee to chest - 1x10 bilateral    Straight leg raise   - 1x10 bilateral    Neoprene wrap to Right knee for all exercises    neuromuscular re-education activities to improve: Balance, Coordination, Kinesthetic, Sense, Proprioception, and Posture for (15) minutes. The following activities were included:    Intervention Performed Today    Air Ex Beam        - Tandem holds 5 seconds, 3x's   - Tandem walking forward/backward 3x's each  - Side stepping 3x's each direction  - tandem walking over hurdles forward and side stepping 2x's each  - heel lifts and toe lifts off of beam with 2-0 hand supports   - single leg standing while tapping opposite foot forward and back on beam   Orange Rubber Beam    - Tandem holds 3 seconds, 3x's bilateral   - Tandem walking forward 4x's   - Side stepping 2x's each direction  - single leg standing while tapping opposite foot forward and back on beam   Air Ex Pad              - Weight shifts Left to Right 2x10  - Weight shifts forward/back 2x10  - single leg standing holds   - Eyes closed 10 seconds  - backward pivot steps with 1 hand support 1x8  bilateral   - standing on pad while toss/catching ball to wall 1x8  - One lower extremity on pad and opposite lower extremity on step tossing ball to wall    Hand paddled per Neuro LEONIE technique and for entirety of session     Core Exercises in supine  Bilateral lower extremities over Tball for each:   - lower trunk rotation 2x10  - bilateral knees to chest 2x10  - posterior pelvic tilt to bridge 2x10   Parallel bars      - walking backward 4x8 ft with 2-1 hand support  - braided walking 4x's length of bars  - narrow base standing without hand support performing head turns/nods 1x5 each, arm reach/lifts 1x5 each   Trunk extension for posture  - 2x10 over pillow and ball at low back    Sit to squat without hand support x 2x10 from progressively lowered mat   Step ups x 6 inch step  - 2 sets of 1 minute on each leg       THERAPEUTIC ACTIVITIES to improve dynamic and functional performance for () minutes including:    Intervention Performed Today    Patient and her granddaughter educated on Home Exercise Program for balance.  Also educated on wearing tennis shoes to future sessions.  Discussed the process of changing schedule to get her seen by an ortho PT 1 day/week then by me 1day/week  - Patient and granddaughter verbalized good understanding of education provided.                                              MANUAL THERAPY TECHNIQUES were applied for (15) minutes, including:    Manual Intervention Performed Today    Soft Tissue Mobilization [x] Left lumbar paraspinal   Joint Mobilizations [x] PIVM lumbar spine    []     []    Functional Dry Needling  [x] Left lumbosacral multifidus and paraspinal     Plan for Next Visit: Continue as needed         Gait Training: to improve functional mobility and safety for  () minutes, including:  - working on increasing step length and upright posture with gait sequence, ambulating 200ft and 100ft with contact guard assist for safety awareness.     Patient Education and Home  Exercises     Home Exercises Provided and Patient Education Provided     Education provided:   - 3/14/23 Patient educated on Home Exercise Program for balance   - 3/16/23 Patient encouraged to stay compliant with Home Exercise Program   Written Home Exercises Provided: yes. Exercises were reviewed and Leslie was able to demonstrate them prior to the end of the session.  Leslie verbalized good  understanding of the education provided. See EMR under Patient Instructions for exercises provided during therapy sessions    ASSESSMENT     Patient reports that right knee and lower back pain are both decreased.  She has started using cane under supervision and reports that she feels more upright in standing.  As a result, she feels that lower back pain is decreased today.  Worked into more closed chain exercises with decreased lower back pain.  She also reports that right knee pain is decreased today.  Therefore, worked on ascending/descending 6 inch step for power movements.  Will continue to progress into those activities as there is continued pain relief.    Leslie Is progressing well towards her goals.   Pt prognosis is Good.     Pt will continue to benefit from skilled outpatient physical therapy to address the deficits listed in the problem list box on initial evaluation, provide pt/family education and to maximize pt's level of independence in the home and community environment.     Pt's spiritual, cultural and educational needs considered and pt agreeable to plan of care and goals.     Anticipated barriers to physical therapy: co-morbidities, sedentary lifestyle, chronicity of condition, and lack of understanding of condition     GOALS:     Short Term Goals:  4 weeks Progress    Function: Patient will demonstrate improved function as indicated by a functional limitation score improved by 3 points from initial measure.  PC   Strength: Patient will demonstrate improved strength by performing 5x sit to  20 seconds in  order to progress towards independence with functional activities.  PC   Balance: Complete Encarnacion Balance Scale and set goals accordingly.  PC   Coordination: Patient will demonstrate improved time of 13 sec on TUG to demonstrate improved coordination and safety with mobility. PC   HEP: Patient will demonstrate independence with HEP in order to progress toward functional independence.  Met   Determine the need of Custom Wheelchair Eval to improve patient's positioning and independence with pressure reliefs and independence with home and community mobility to decrease burden of care. D/C not needed at this time.              Long Term Goals:  8 weeks Progress   Function: Patient will demonstrate improved function as indicated by a functional limitation score improved 5 points from initial measure.  PC   Strength: Patient will demonstrate improved strength by performing 5x sit to  17 seconds in order to progress towards independence with functional activities.  PC   Patient will return to ADL's, IADL's, community involvement, recreational activities, and work-related activities with less than or equal to 5/10 pain and maximal function.  PC   Balance: Complete Encarnacion Balance Scale and set goals accordingly.  PC   Coordination: Patient will demonstrate improved time of 11 sec on TUG to demonstrate improved coordination and safety with mobility. PC   HEP: Patient educated on HEP in preparation for d/c verbalizing and demonstrating good understanding of topics discussed.   PC            Goals Key:  PC= progressing/continue;        PM= partially met;             DC= discontinue    PLAN     Continue Plan of Care (POC) and progress per patient tolerance. See Treatment section for exercise progression.    Leoncio Lujan, PT

## 2023-04-24 NOTE — PROGRESS NOTES
[facility]  Speech Therapy Treatment Note- Neurological Rehabilitation    Date: 4/24/2023     Name: Leslie Wood   MRN: [id number]   Therapy Diagnosis:   Encounter Diagnoses   Name Primary?    Aphasia Yes    Cognitive communication disorder      Physician: Sammy Juarez MD  Physician Orders: [id number] - AMB REFERRAL/CONSULT TO SPEECH THERAPY  Medical Diagnosis: R47.01 (ICD-10-CM) - Aphasia    Visit #/ Visits Authorized: 13/20  Date of Evaluation:  2/17/2023   Insurance Authorization Period: 2/23/2023 - 12/31/2023   Plan of Care Expiration Date: 6/26/2023   Extended Plan of Care:  NA   Progress Note: 5/18/2023  Visits Cancelled: 0  Visits No Show: 0    Time In:  11:00 AM   Time Out: 11:38 PM  TOTAL TIME:  38 minutes      Precautions: Standard  Subjective:   Patient reports: Improvement in back pain recently. She has been using a cane instead of a walker.    She was compliant to home exercise program.     Response to previous treatment: Continued progress towards visual selective attention.     Pain Scale:  0/10 on a Visual Analog Scale currently.   Pain Location:  N/A  Objective:   TIMED  Procedure Min.   Cognitive Therapeutic Interventions, first 15 minutes CPT 84969  15   Cognitive Therapeutic Interventions, each additional 15 minutes CPT 74154  23         UNTIMED  Procedure Min.   Speech- Language- Voice Therapy  0     Total Timed Units: 3  Total Untimed Units: 0  Charges Billed/Number of units: 85356/1; 96242/2    Short Term Goals: (6 weeks) Current Progress:   Patient will describe functional objects/images to facilitate a strategy of description in order to increase transfer of intended message with 80% accuracy and moderate clinician cueing.     Progressing/ Not Met 4/24/2023   Not formally addressed.     Patient will complete word finding task (i.e. Creating subject, verb, object pairs in VNEST protocol) with 90% accuracy moderate assistance to improve word fluency.     Met x2 previously      Progressing/ Not Met 4/24/2023   Not formally addressed     Patient will utilize word finding strategies in structured tasks with 70% accuracy and minimal cues.      Progressing/ Not Met 4/24/2023   Not formally addressed.    Patient will summarize read material to improve word fluency, word finding, and reading comprehension in Attentive Reading and Constrained Summarization (ARCS) protocol with 80% accuracy and minimal verbal assistance across 2-3 sessions.     Progressing/ Not Met 4/24/2023   Not formally addressed.      Patient will write 7-10 word sentences with no more than 2 syntactic/grammatical errors given minimal cueing.      Progressing/ Not Met 4/24/2023   Not formally addressed.      Patient will complete functional writing task with complete, coherent, and accurately spelled responses with 80% accuracy and minimal verbal and visual assistance across 3-5 sessions.     Progressing/ Not Met 4/24/2023   Not formally addressed.      Patient will complete RBANS assessment to further assess cognitive communication skills.        GOAL MET 2/27/2023  Completed today. See below for results.       GOAL MET 2/27/2023    Patient will sustain attention to a moderate task (auditory or visual) for 2 minutes with 90% accuracy or no more than 1 redirection.     Visual: GOAL MET 3/20/2023  Auditory: GOAL MET 3/20/2023  VISUAL:   Task 1: Visual sustained attention task completed in which patient visually scanned an article looking for certain letters. She completed this task with 87% accuracy independently (36/41). Clinician declan a purple line on the left hand side to serve an anchor. Patient utilized a blank paper to keep track of which line she was working on.     Task 2: Visual sustained attention task completed in which patient visually scanned a phone bill looking for unexplained calls. She completed this task with 90% accuracy given minimal cueing. The one error that she made was due to skipping over an area  code that was more offset to the left than the others. Clinician and patient dicussed left neglect in depth that is likely due to her first cerebral vascular accident (right cerebral vascular accident).     AUDITORY:  Task 1: Constant Therapy: Understanding Voicemails was completed today with 90% accuracy independently (improvement from last session with 70% accuracy). She required minimal assistance for navigating the application (pressing the next button, replaying the voicemail)    GOAL MET 3/20/2023    Patient will complete selective attention tasks (auditory or visual) with 90% accuracy independently increase selective attention.    Visual: GOAL MET 4/6/2023  Auditory: Met x1 Visual:   Medication management task. Patient was asked to sort three medications into a weekly pill box using the instructions provided on each pill bottle. She completed this task with 92% accuracy (correctly sorted 26 out of 28 individual pills) independently. Clinician and patient discussed the two missed pills and potential negative consequences of missing medication.     Auditory:  Patient completed auditory selective attention task of immediate recall of simple sentences with natural background noise (Constant Therapy: repeat active sentences Level 1). She completed this task with 99% accuracy.     Patient completed auditory selective attention task of immediate recall of simple sentences with natural background noise (Constant Therapy: repeat active sentences Level 3). She completed this task with 96% accuracy.     Patient completed auditory selective attention task of immediate recall of complex sentences with natural background noise (Constant Therapy: repeat complex sentences Level 1). She completed this task with 90% accuracy given 5-7 repetitions of stimuli as well as consistent verbal cueing from clinician to use strategies (I.e. visualization/taking notes). She demonstrated perseveration on errors. For examples, if she used  an incorrect word, she would continue to use that incorrect word even after specifically corrected by clinician.      Patient will use attention shifting strategies to shift attention between two tasks (auditory or visual) with no more than 3 cues or 90% accuracy to improve alternating attention. Not formally addressed.    Patient will complete short term recall tasks after a 5 minutes delay with 90% accuracy  independently  with use of memory strategies to improve recall of information and generalization of memory strategies. Not formally addressed.    Patient will use Goal Plan Action Review strategy to complete moderate to complex reasoning, planning, or organization tasks with 90% accuracy independently to improve functional executive function skills. Not formally addressed.    Patient will complete a functional task to improve attention, memory and/or executive functions (I.e. sample bill paying activity, recipe, or multiple choice comprehension questions to 1 paragraphs) with 80% accuracy and natural environment noise distractions (TV news background, music, etc.). Not formally addressed.    Patient will be educated regarding cognitive deficits as applicable to the patient's life for increased awareness and will execute returned demonstration of information with 80% accuracy.           GOAL MET 3/6/2023  Education provided regarding results from her assessment, the hierarchy of neuropsychological functions, and the role of attention in other executive functions. Patient and granddaughter verbalized understanding.     GOAL MET 3/6/2023    Patient will independently generate strategies to improve ability to complete tasks with enhanced accuracy and time, based on review of objective previous performance on trials of functional tasks with 80% accuracy.  Not formally addressed.    Given implementation of strategies, patient will complete a task following initial attempt with 90% accuracy and reported reduced  number on the relative scale of cognitive load when compared to previous trial.  Not formally addressed.    Patient will use external memory strategies in order to recall important dates, events, appointments, or tasks to be completed with 90% accuracy independently.  Not formally addressed.        Patient Education/Response:   Education provided on updated plan of care. Patient and granddaughter verbalized understanding of all discussed.     Home program established: yes-continue prior HEP.   Exercises were reviewed and Leslie was able to demonstrate them prior to the end of the session.  Leslie demonstrated good  understanding of the education provided.     See Electronic Medical Record under Patient Instructions for exercises provided throughout therapy.  Assessment:   Patient is very motivated to improve and participates in all therapy tasks presented. Steady improvements observed over the course of today's session with immediate recall of auditory stimuli. Decreased accuracy observed with increased complexity of task. She will benefit from continued speech therapy to address the above deficits and to utilize strategies as needed.     Leslie is progressing well towards her goals.Current goals remain appropriate. Goals will be updated as needed.     Patient prognosis is Good. Patient will continue to benefit from skilled outpatient speech and language therapy to address the deficits listed in the problem list on initial evaluation, provide patient/family education and to maximize patient's level of independence in the home and community environment.     Medical necessity is demonstrated by the following IMPAIRMENTS:  Language deficits impact overall quality of life, ability to participation in social and community interactions, and the ability to communicate important medical information if needed.   Cognitive-communication deficits impact overall quality of life and the ability to safely and independently complete  activities of daily living.     Barriers to Therapy: none   Patient's spiritual, cultural and educational needs considered and patient agreeable to plan of care and goals.  Plan:   Continue Plan of Care with focus on sustained/selective attention for functional daily tasks.     Becca Blanco, CCC-SLP, CBIS   Speech Language Pathologist   Certified Brain Injury Specialist   4/24/2023

## 2023-04-24 NOTE — TELEPHONE ENCOUNTER
Reached out to patient to schedule appointment from messages. Apt has been made.   Pt understand. All questions answered.     Cesar Dietz  Medical Assistant

## 2023-04-24 NOTE — TELEPHONE ENCOUNTER
----- Message from Kahlil Krishnamurthy sent at 4/24/2023  2:03 PM CDT -----  Type:  Sooner Apoointment Request    Caller is requesting a sooner appointment.  Caller declined first available appointment listed below.  Caller will not accept being placed on the waitlist and is requesting a message be sent to doctor.  Name of Caller: Michael Colunga  When is the first available appointment? 7-7-2023  Symptoms:  back pain  Would the patient rather a call back or a response via [employer]?  Call back   Best Call Back Number: [phone] pt's daughter Lucy, contact her regarding appointments   Additional Information:  no

## 2023-04-25 NOTE — PROGRESS NOTES
Established Patient Chronic Pain Note      Chief Complaint:   Chief Complaint   Patient presents with    Low-back Pain        SUBJECTIVE:    Interval History (4/27/2023):  Leslie Wood presents today for follow-up visit.  Patient was last seen over 6 months ago.  Patient returns today with continued chronic low back pain.  She has a history of 3 lumbar surgeries, and she has a spinal cord stimulator implant, which was adjusted this week.  She is [facility]  She is currently taking Nucynta 50 mg once per day and was recently increased to twice per day.  She has tried several procedures in injections with minimal relief.  She does not take the medication daily almost sometimes skipping a couple days; at most, has only taken once per day.  Patient reports pain as 8/10 today.  He has tried dry needling in the past and physical therapy, which only helped short term.  Recently, her pain management doctor added Cymbalta to the Nucynta regimen, which seems to be helping some-per granddaughter point of view.    Initial HPI (10/19/2022):  Leslie Wood is a 82 y.o. female who presents to the clinic for the evaluation of chronic back pain.  She is self referred.  She is [facility]  The pain started several years ago and symptoms have been unchanged.  She has undergone multiple different spine surgeries, spinal cord stimulator placement, multiple different interventional pain procedures.  The pain is located in the lumbosacral area and radiates to the bilateral lower extremities.  The pain is described as  sharp, stabbing, aching  and is rated as 8/10. The pain is rated with a score of  7/10 on the BEST day and a score of 10/10 on the WORST day.  Symptoms interfere with daily activity. The pain is exacerbated by movement.  The pain is mitigated by nothing.   Patient denies night fever/night sweats, urinary  incontinence, bowel incontinence, significant weight loss, significant motor weakness, and loss of sensations.    Pain Disability Index Review:   No flowsheet data found.    Non-Pharmacologic Treatments:  Physical Therapy/Home Exercise: yes  Ice/Heat:yes  TENS: no  Acupuncture: no  Massage: yes  Chiropractic: no    Other: no    Pain Medications:  - Opioids:  Nucynta, belbuca, tramadol, Norco  - Adjuvant Medications:  Cymbalta, Zanaflex, diclofenac gel  - Anti-Coagulants: Aspirin and Plavix ( Clopidogrel)     report:  Reviewed and consistent with medication use as prescribed.    Pain Procedures:   -multiple different pain procedures including JAMESON, lumbar MBB/RFA, and spinal cord stimulation implant ([facility])      Imaging (Reviewed on 4/27/2023):    11/09/2022 CT Thoracic Spine Without Contrast  COMPARISON:  October 21, 2022    FINDINGS:  The thoracic vertebrae reveal normal alignment, shape and bone density. The thoracic vertebra are intact without evidence of fracture or dislocation.    Stable spine stimulator wires involving the mid and lower thoracic region.  Stable methylmethacrylate within the T11 vertebral body with anterior wedging.  Stable multilevel marginal spondylosis.  Stable vacuum disc phenomenon at T9/T10 disc level.  The thecal sac is widely patent.    2 mm right upper lobe nodule, possibly calcified.    Aortic calcifications without aneurysmal change.  Coronary artery calcifications.  Calcifications of the tracheobronchial tree.  The visualized portions of the chest soft tissues and osseous structures are otherwise normal.    Impression  1.  Negative CT scan of the thoracic spine. No evidence of fracture or subluxation.    2.  2 mm right upper lobe pulmonary nodule, possibly calcified.  This is statistically benign.    3.  Stable findings as noted above    All CT scans at this facility are performed  using dose modulation techniques as appropriate to performed exam including the following:   automated exposure control; adjustment of mA and/or kV according to the patients size (this includes techniques or standardized protocols for targeted exams where dose is matched to indication/reason for exam: i.e. extremities or head);  iterative reconstruction technique.      11/09/2022 CT Lumbar Spine Without Contrast  COMPARISON:  February 20, 2022    FINDINGS:  Stable inter pedicular screws with ipsilateral Flor rods involving the bilateral L3 and L4 pedicles.  Stable hardware within the L3/L4 disc as well as hardware involving the L3 and L4 spinous processes.  Stable spine stimulator in place, with leads entering at the L1 level extending into the lower thoracic region.    Moderate to marked disc height reduction with endplate sclerosis, marginal spondylosis and vacuum phenomenon at L2/L3 and to a lesser degree L1/L2. The lumbar vertebrae otherwise reveal normal alignment, shape and bone density. The lumbar vertebra are intact without evidence of fracture or dislocation.  Moderate to marked lower lumbar spine degenerative facet arthropathy again seen.  Osteopenia again seen.  Stable vacuum phenomenon of the sacroiliac joints.  The thecal sac is patent.    There is a 7 cm superior pole left renal cyst.  Vascular calcifications are present without aneurysmal change.  Bilateral gonadal vein phleboliths.  1.3 cm right adrenal adenoma measuring 0 Hounsfield units.  The surrounding soft tissues are otherwise.    Impression  1.  Negative CT scan of the lumbar spine. No evidence of fracture or subluxation.    2.  Numerous stable findings as noted above in this patient with multiple postoperative changes to the lumbar spine, spine stimulator in place and degenerative changes of the spine most significantly involving L1/L2 and L2/L3.    3.  7 cm superior pole left renal cyst.  Other nonemergent findings as described above.    All CT scans at this facility are performed  using dose modulation techniques as  appropriate to performed exam including the following:  automated exposure control; adjustment of mA and/or kV according to the patients size (this includes techniques or standardized protocols for targeted exams where dose is matched to indication/reason for exam: i.e. extremities or head);  iterative reconstruction technique.      11/09/2022 CT Cervical Spine Without Contrast  COMPARISON:  No comparison studies are available.    FINDINGS:  The cervical vertebrae reveal normal alignment, shape and bone density. The cervical vertebra are intact without evidence of fracture or dislocation.    There is disc height reduction from C4/C5 through C6/C7, most significantly involving C5/C6 with multilevel anterior and posterior marginal spondylosis noted at these levels.  There are degenerative changes between the anterior arch of C1 and the dens with early pannus formation posterior to the dens.  Mild to moderate multilevel degenerative facet arthropathy.  There is fusion of the bilateral C2/C3 facets.    No significant spinal canal stenosis is seen.  There are mild disc bulges causing mild spinal canal stenosis at the C4/C5 and C6/C7 levels.  Nuchal ligament calcification is seen at the C7 vertebral body level.    Mild right C4/C5, moderate to severe right greater than left C5/C6, and moderate bilateral C6/C7 nerve root foramen narrowing.  The nerve roots are otherwise patent.    Bilateral carotid artery calcification.  Bruit?  The surrounding neck soft tissues are otherwise normal.  The lung apices are clear.  The thyroid gland is normal.    Impression  1.  Negative CT scan of the cervical spine. No evidence of fracture or subluxation.    2.  Multilevel degenerative disc changes most significantly involving C5/C6.  Multilevel degenerative facet arthropathy with fusion of the bilateral C2/C3 facets.  Mild broad-based disc bulges at C4/C5 and C6/C7 without significant spinal canal stenosis.  Multilevel nerve root foramen  narrowing from uncovertebral osteophyte formation and facet hypertrophy most significantly involving bilateral C5/C6.    3.  Carotid artery calcifications.  Bruit?    4.  Nonemergent findings as described above.    All CT scans at this facility are performed  using dose modulation techniques as appropriate to performed exam including the following:  automated exposure control; adjustment of mA and/or kV according to the patients size (this includes techniques or standardized protocols for targeted exams where dose is matched to indication/reason for exam: i.e. extremities or head);  iterative reconstruction technique.      10/21/2022 XR THORACOLUMBAR SPINE AP LATERAL  COMPARISON:  February 2022.  FINDINGS:  Bones are demineralized.  Degenerative changes in the lower thoracic spine are seen.  No acute fracture.  Thoracic spinal stimulator leads are noted in the midline.  Kyphoplasty changes in the lower thoracic spine stable postsurgical changes with vertical rods and pedicular screws for posterior lower lumbar fusion with disc insert in place.  One level above the fusion there is moderate disc space narrowing with adjacent marginal spurring.  Surgical clips with suture material projects about the left upper quadrant.  Postsurgical changes in the right lower quadrant and right upper quadrant are stable in appearance.  Bowel gas pattern is nonobstructive.                                   X-ray right knee 09/20/2022:   Bilateral multi compartment degenerative findings present including severe joint space loss within the left knee medial compartment and right knee lateral compartment.  Moderate to severe right knee medial compartment joint space loss.  Right knee suprapatellar joint effusion possible.  No acute fracture.    X-ray right hip 02/20/2022:   Multiple radiographic views  were obtained.  No acute fracture.  Degenerative joint disease identified.  Decreased bone mineral density.  Device and postsurgical  "changes in the right hemipelvis and spine.    X-ray lumbar spine 02/20/2022:  Multiple radiographic views  were obtained.  Degenerative joint disease of the spine.  Hernia repair noted.  Device with wires identified.  Degenerative joint disease of the spine.  Postsurgical changes with hyperdense region between the disc space of L3-L4.  Degenerative joint disease identified.  Mild retrolisthesis of to L2 with respect to L1 likely on a degenerative basis.          Review of Systems:  GENERAL:  No weight loss, malaise or fevers.  HEENT:   No recent changes in vision or hearing  NECK:  Negative for lumps, no difficulty with swallowing.  RESPIRATORY:  Negative for cough, wheezing or shortness of breath, patient denies any recent URI.  CARDIOVASCULAR:  Negative for chest pain, leg swelling or palpitations.  GI:  Negative for abdominal discomfort, blood in stools or black stools or change in bowel habits.  MUSCULOSKELETAL:  See HPI.  SKIN:  Negative for lesions, rash, and itching.  PSYCH:  No mood disorder or recent psychosocial stressors.  Patients sleep is not disturbed secondary to pain.  HEMATOLOGY/LYMPHOLOGY:  Negative for prolonged bleeding, bruising easily or swollen nodes.  Patient is currently taking anti-coagulants-aspirin and Plavix  NEURO:   No history of headaches, syncope, paralysis, seizures or tremors.  All other reviewed and negative other than HPI.      OBJECTIVE:    Physical Exam:  Vitals:    04/27/23 1035   Weight: 60.7 kg (133 lb 13.1 oz)   Height: 5' 1" (1.549 m)   PainSc:   8   PainLoc: Back    Body mass index is 25.28 kg/m².   (reviewed on 4/27/2023)    GENERAL: Well appearing, in no acute distress, alert and oriented x3.  PSYCH:  Mood and affect appropriate.  SKIN: Skin color, texture, turgor normal, no rashes or lesions.  HEAD/FACE:  Normocephalic, atraumatic.   PULM: No evidence of respiratory difficulty, symmetric chest rise.  GI:  Soft and non-tender.  BACK:  Surgical scarring.  SLR is " equivocal to radicular pain.  Moderate pain to palpation over the facet joints of the lumbar spine o and lumbar paraspinals.  Limited range of motion secondary to pain reproduction.  EXTREMITIES: Peripheral joint ROM is full and pain free without obvious instability or laxity in all four extremities. No deformities, edema, or skin discoloration. Good capillary refill.  MUSCULOSKELETAL:  Hip and knee provocative maneuvers are unremarkable.  There is mild-to-moderate TTP over the sacroiliac joints bilaterally.  FABERs test is equivocal.  FADIRs test is equivocal.  BUE & BLE strength is normal and symmetric.  No atrophy or tone abnormalities are noted.  NEURO: BUE & BLE coordination and muscle stretch reflexes are physiologic and symmetric.  Plantar response are downgoing. No clonus.  No loss of sensation is noted.  GAIT:  Wheelchair.        ASSESSMENT: 82 y.o. year old female with chronic lower back and leg pain, consistent with     1. Lumbar post-laminectomy syndrome        2. Chronic pain disorder        3. History of lumbar surgery            PLAN:   - Interventions:  None at this time, patient may benefit from spinal cord stimulator swap to [employer]ro device .  Patient recently had SCS implant adjustment with [facility].  Patient to discuss retrying lumbar RFA with current pain provider.    - Anticoagulation use: ASA and Plavix    - Medications:  - Continue Nucynta 50 mg (recently increased to BID) with Cymbalta [facility] at [facility].  - Continue Zanaflex (tizanidine) 4mg PRN.   - Previously previously advised the patient that we would not be taking over her opioid based medications; she is currently under pain contract with outside pain clinic.  We again discussed this today.    - LA  reviewed and appropriate.       - Therapy:  Advised to continue with activities as tolerated.    - Imaging: Thoracolumbar x-ray reviewed, also recent lumbar CT reviewed as well.    - Follow up visit: PRN     - Direct patient  care provided: 20 minutes+. Over 50% of the time was spent counseling/educating the patient. Total time spent on patient care including prep time reviewing the chart before the visit, time spent with patient during the visit, and the time spent after the visit reviewing studies, documenting note, obtaining information from collaborative providers, etc.: >40 minutes.    - This condition does not require this patient to take time off of work, and the primary goal of our Pain Management services is to improve the patient's functional capacity.   - I discussed the risks, benefits, and alternatives to potential treatment options. All questions and concerns were fully addressed today in clinic.         Trinity Sanders PA-C  Interventional Pain Management - [facility]    Disclaimer:  This note was prepared using voice recognition system and is likely to have sound alike errors that may have been overlooked even after proof reading.  Please call me with any questions.

## 2023-04-27 ENCOUNTER — OFFICE VISIT (OUTPATIENT)
Dept: PAIN MEDICINE | Facility: CLINIC | Age: 83
End: 2023-04-27
Payer: MEDICARE

## 2023-04-27 ENCOUNTER — CLINICAL SUPPORT (OUTPATIENT)
Dept: REHABILITATION | Facility: HOSPITAL | Age: 83
End: 2023-04-27
Payer: MEDICARE

## 2023-04-27 VITALS — BODY MASS INDEX: 25.27 KG/M2 | WEIGHT: 133.81 LBS | HEIGHT: 61 IN

## 2023-04-27 DIAGNOSIS — Z74.09 IMPAIRED FUNCTIONAL MOBILITY, BALANCE, GAIT, AND ENDURANCE: Primary | ICD-10-CM

## 2023-04-27 DIAGNOSIS — R41.841 COGNITIVE COMMUNICATION DISORDER: ICD-10-CM

## 2023-04-27 DIAGNOSIS — G89.4 CHRONIC PAIN DISORDER: ICD-10-CM

## 2023-04-27 DIAGNOSIS — R47.01 APHASIA: Primary | ICD-10-CM

## 2023-04-27 DIAGNOSIS — Z98.890 HISTORY OF LUMBAR SURGERY: ICD-10-CM

## 2023-04-27 DIAGNOSIS — M96.1 LUMBAR POST-LAMINECTOMY SYNDROME: Primary | ICD-10-CM

## 2023-04-27 PROCEDURE — 97112 NEUROMUSCULAR REEDUCATION: CPT

## 2023-04-27 PROCEDURE — 99999 PR PBB SHADOW E&M-EST. PATIENT-LVL III: ICD-10-PCS | Mod: PBBFAC,,, | Performed by: PHYSICIAN ASSISTANT

## 2023-04-27 PROCEDURE — 99213 OFFICE O/P EST LOW 20 MIN: CPT | Mod: PBBFAC | Performed by: PHYSICIAN ASSISTANT

## 2023-04-27 PROCEDURE — 97129 THER IVNTJ 1ST 15 MIN: CPT | Mod: KX

## 2023-04-27 PROCEDURE — 97130 THER IVNTJ EA ADDL 15 MIN: CPT | Mod: KX

## 2023-04-27 PROCEDURE — 99215 OFFICE O/P EST HI 40 MIN: CPT | Mod: S$PBB,,, | Performed by: PHYSICIAN ASSISTANT

## 2023-04-27 PROCEDURE — 99999 PR PBB SHADOW E&M-EST. PATIENT-LVL III: CPT | Mod: PBBFAC,,, | Performed by: PHYSICIAN ASSISTANT

## 2023-04-27 PROCEDURE — 99215 PR OFFICE/OUTPT VISIT, EST, LEVL V, 40-54 MIN: ICD-10-PCS | Mod: S$PBB,,, | Performed by: PHYSICIAN ASSISTANT

## 2023-04-27 NOTE — PROGRESS NOTES
[facility]   Physical Therapy Treatment Note + UPOC    Name: Leslie CASTELLON Israel  [facility] Number: [id number]    Therapy Diagnosis:   Encounter Diagnosis   Name Primary?    Impaired functional mobility, balance, gait, and endurance Yes     Physician: Sepideh Peters,*    Visit Date: 4/27/2023    Physician Orders: PT Eval and Treat   Medical Diagnosis from Referral: R29.6 (ICD-10-CM) - Falls frequently  Evaluation Date: 2/27/2023  Authorization Period Expiration: 2/17/24  Plan of Care Expiration: 4/28/23  Progress Note Due: 5/6/23  Visit # / Visits authorized: 12/12 (+eval)  FOTO: 0/3 not appropriate due to poor insight and cognitive deficits     Precautions: Standard and Fall     PTA Visit #: 0/5     Time In: 0931  Time Out: 1015  Total Billable Time: 44 minutes    SUBJECTIVE     Pt reports: Patient reports that she feels as though she is improving.  She has started using a standard cane with supervision and reports that she feels as though she is walking more upright.  She does report of pain relief at the lower back region.  She also reports that she is wearing a knee brace at the right knee which also helps to decrease pain.    She reports that she was not compliant with Home Exercise Program as she left it in her granddaughter's car.   Response to previous treatment: She responded well to last session.   Functional change: She reports no functional change since last session.     Pain: 4/10  Location: right knee and lower back    OBJECTIVE     Objective Measures updated at progress report unless specified.   Functional Assessments:  Test Eval Score 3/30/23 4/27/23   TUG 15.35 seconds 13.98 seconds NT due to patient with wedged heel shoes today   5 x Sit to Stand 23.55 seconds 14.14 seconds 12.45 seconds   4 Square Step Test 26 seconds NT NT due to patient with wedged heel shoes today     MOREAU BALANCE ASSESSMENT  Max score of 4 each item.    4/27/23   1.Sitting to Standing  4/4    2.Standing Unsupported 4/4   3.Sitting with Back Unsupported  4/4   4.Standing to Sitting  4/4   5.Transfers 4/4   6.Standing Unsupported with Eyes Closed 4/4   7.Standing Unsupported with Feet Together 4/4   8.Reaching Forward with Outstretched Arm while Standing  3/4   9. Object from the Floor from a Standing Position  3/4   10.Turning to Look Behind Shoulder while Standing  4/4   11.Turn 360 Degrees  3/4   12.Placing Alternate Foot on Step while Standing Unsupported 1/4 - needed minimum assist   13.Standing Unsupported One Foot in Front 2/4   14.Standing on One Leg 2/4 3 seconds Left, 4 seconds Right      Patient reports a score of 46/56 on the Encarnacion Balance Assessment on 4/27/23.              Treatment     Leslie received the treatments listed below:      THERAPEUTIC EXERCISES to develop strength, endurance, ROM, flexibility, posture, and core stabilization for (6) minutes including:    Intervention Performed Today    Supine Stretches  - 3x30 seconds bilateral for each:  - lower trunk rotation   - single knee to chest  - hamstring/ heelcord   Seated Stretches  - 3x30 seconds bilateral for each:  - hamstring/ heelcord  - hip adductor   Standing Stretches  - 3x15 seconds bilateral for each:  - IT band   - heelcord    Seated lower extremity exercises          - hip flexion 2x10 bilateral   - hamstring curl 2x10 bilateral   - dorsiflexion/ plantarflexion 2x10 bilateral with green band  - eversion 1x10 bilateral   - toe curls and flares 1x20 bilateral   - hip adduction against ball 1x10 with 3 second hold  - hip abduction against belt 1x10 with 3 second hold   Standing lower extremity exercises   - hip flexion 1x10 bilateral   - hip abduction 1x10 bilateral   - hip extension 1x10 bilateral   - heel raise 2x10    NuStep for endurance x - Level 2, 6 minutes   Bicycle for endurance and ROM  - Level 1 x 6 minutes   Supine  - Quad set with short arc quad 2x10 with 3 second hold   - dorsiflexion against green band  2x10 bilateral    Lower Trunk Rotations  - 2x10    Pelvic tilts  3 seconds hold 1x10   Bridges   1x10   Abdominal Bracing  Knee to chest - 1x10 bilateral    Straight leg raise   - 1x10 bilateral    Neoprene wrap to Right knee for all exercises    neuromuscular re-education activities to improve: Balance, Coordination, Kinesthetic, Sense, Proprioception, and Posture for (38) minutes. The following activities were included:    Intervention Performed Today    Air Ex Beam        - Tandem holds 5 seconds, 3x's   - Tandem walking forward/backward 3x's each  - Side stepping 3x's each direction  - tandem walking over hurdles forward and side stepping 2x's each  - heel lifts and toe lifts off of beam with 2-0 hand supports   - single leg standing while tapping opposite foot forward and back on beam   Orange Rubber Beam    - Tandem holds 3 seconds, 3x's bilateral   - Tandem walking forward 4x's   - Side stepping 2x's each direction  - single leg standing while tapping opposite foot forward and back on beam   Air Ex Pad              - Weight shifts Left to Right 2x10  - Weight shifts forward/back 2x10  - single leg standing holds   - Eyes closed 10 seconds  - backward pivot steps with 1 hand support 1x8 bilateral   - standing on pad while toss/catching ball to wall 1x8  - One lower extremity on pad and opposite lower extremity on step tossing ball to wall    Hand paddled per Neuro LEONIE technique and for entirety of session     Core Exercises in supine  Bilateral lower extremities over Tball for each:   - lower trunk rotation 2x10  - bilateral knees to chest 2x10  - posterior pelvic tilt to bridge 2x10   Parallel bars      - walking backward 4x8 ft with 2-1 hand support  - braided walking 4x's length of bars  - narrow base standing without hand support performing head turns/nods 1x5 each, arm reach/lifts 1x5 each   Trunk extension for posture  - 2x10 over pillow and ball at low back    Sit to squat without hand support  2x10  from progressively lowered mat   Step ups  6 inch step  - 2 sets of 1 minute on each leg   Functional Re-Tests x - see above   Encarnacion Balance Scale x - see above   Agility Drills on Turf X  X  X - side stepping 2x20 ft  - backward walking 2x20 ft - without shoes  - tandem walking 2x15 ft - without shoes       THERAPEUTIC ACTIVITIES to improve dynamic and functional performance for () minutes including:    Intervention Performed Today    Patient and her granddaughter educated on Home Exercise Program for balance.  Also educated on wearing tennis shoes to future sessions.  Discussed the process of changing schedule to get her seen by an ortho PT 1 day/week then by me 1day/week  - Patient and granddaughter verbalized good understanding of education provided.                                              MANUAL THERAPY TECHNIQUES were applied for () minutes, including:    Manual Intervention Performed Today    Soft Tissue Mobilization [] Left lumbar paraspinal   Joint Mobilizations [] PIVM lumbar spine    []     []    Functional Dry Needling  [] Left lumbosacral multifidus and paraspinal     Plan for Next Visit: Continue as needed         Gait Training: to improve functional mobility and safety for  () minutes, including:  - working on increasing step length and upright posture with gait sequence, ambulating 200ft and 100ft with contact guard assist for safety awareness.     Patient Education and Home Exercises     Home Exercises Provided and Patient Education Provided     Education provided:   - 3/14/23 Patient educated on Home Exercise Program for balance   - 3/16/23 Patient encouraged to stay compliant with Home Exercise Program   - 4/27/23 discussed continuing with James PT for dry needling 1 day/week for the next 4 weeks.   Written Home Exercises Provided: yes. Exercises were reviewed and Leslie was able to demonstrate them prior to the end of the session.  Leslie verbalized good  understanding of the education provided.  See EMR under Patient Instructions for exercises provided during therapy sessions    ASSESSMENT     Patient responded well to Encarnacion Balance test and Sit to stand re-test.  She demonstrated improved sit to stand score since last assessed and reports decrease in low back pain.  She consistently has Right patella dislocation laterally with sit to stand transfer, which causes discomfort and pain.  She had a Right knee brace to assist with patella and joint support, but she was unable to tolerate brace due to pain.  She would benefit from continued PT for dry needling, strengthening of core and legs and gait training with cane for balance and safety.     Leslie Is progressing well towards her goals.   Pt prognosis is Good.     Pt will continue to benefit from skilled outpatient physical therapy to address the deficits listed in the problem list box on initial evaluation, provide pt/family education and to maximize pt's level of independence in the home and community environment.     Pt's spiritual, cultural and educational needs considered and pt agreeable to plan of care and goals.     Anticipated barriers to physical therapy: co-morbidities, sedentary lifestyle, chronicity of condition, and lack of understanding of condition     GOALS:     Short Term Goals:  4 weeks Progress    Function: Patient will demonstrate improved function as indicated by a functional limitation score improved by 3 points from initial measure.  PC   Strength: Patient will demonstrate improved strength by performing 5x sit to  20 seconds in order to progress towards independence with functional activities.  Met   Balance: Complete Encarnacion Balance Scale and set goals accordingly.  Met   Coordination: Patient will demonstrate improved time of 13 sec on TUG to demonstrate improved coordination and safety with mobility. PC   HEP: Patient will demonstrate independence with HEP in order to progress toward functional independence.  Met   Determine the  need of Custom Wheelchair Eval to improve patient's positioning and independence with pressure reliefs and independence with home and community mobility to decrease burden of care. D/C not needed at this time.              Long Term Goals:  8 weeks Progress   Function: Patient will demonstrate improved function as indicated by a functional limitation score improved 5 points from initial measure.  PC   Strength: Patient will demonstrate improved strength by performing 5x sit to  17 seconds in order to progress towards independence with functional activities.  Met   Patient will return to ADL's, IADL's, community involvement, recreational activities, and work-related activities with less than or equal to 5/10 pain and maximal function.  PC   Balance: Improve Encarnacion Balance score to 50/56 to demonstrate improved balance and decreased risk of falls.  PC   Coordination: Patient will demonstrate improved time of 11 sec on TUG to demonstrate improved coordination and safety with mobility. PC   HEP: Patient educated on HEP in preparation for d/c verbalizing and demonstrating good understanding of topics discussed.   PC            Goals Key:  PC= progressing/continue;        PM= partially met;             DC= discontinue    PLAN     Continue Plan of Care (POC) and progress per patient tolerance. See Treatment section for exercise progression.    Eugenie Stone, PT

## 2023-04-27 NOTE — PROGRESS NOTES
[facility]  Speech Therapy Treatment Note- Neurological Rehabilitation    Date: 4/27/2023     Name: Leslie Wood   MRN: [id number]   Therapy Diagnosis:   Encounter Diagnoses   Name Primary?    Aphasia Yes    Cognitive communication disorder      Physician: Sammy Juarez MD  Physician Orders: [id number] - AMB REFERRAL/CONSULT TO SPEECH THERAPY  Medical Diagnosis: R47.01 (ICD-10-CM) - Aphasia    Visit #/ Visits Authorized: 14/20  Date of Evaluation:  2/17/2023   Insurance Authorization Period: 2/23/2023 - 12/31/2023   Plan of Care Expiration Date: 6/26/2023   Extended Plan of Care:  NA   Progress Note: 5/18/2023  Visits Cancelled: 0  Visits No Show: 0    Time In:  8:52 AM   Time Out: 9:30 AM  TOTAL TIME:  38 minutes      Precautions: Standard  Subjective:   Patient reports: Improvement in back pain recently. She has been using a cane instead of a walker.    She was compliant to home exercise program.     Response to previous treatment: Continued progress towards visual selective attention.     Pain Scale:  0/10 on a Visual Analog Scale currently.   Pain Location:  N/A  Objective:   TIMED  Procedure Min.   Cognitive Therapeutic Interventions, first 15 minutes CPT 29921  15   Cognitive Therapeutic Interventions, each additional 15 minutes CPT 69175  23         UNTIMED  Procedure Min.   Speech- Language- Voice Therapy  0     Total Timed Units: 3  Total Untimed Units: 0  Charges Billed/Number of units: 88734/1; 52809/2    Short Term Goals: (6 weeks) Current Progress:   Patient will describe functional objects/images to facilitate a strategy of description in order to increase transfer of intended message with 80% accuracy and moderate clinician cueing.     Progressing/ Not Met 4/27/2023   Not formally addressed.     Patient will complete word finding task (i.e. Creating subject, verb, object pairs in VNEST protocol) with 90% accuracy moderate assistance to improve word fluency.     Met x2 previously      Progressing/ Not Met 4/27/2023   Not formally addressed     Patient will utilize word finding strategies in structured tasks with 70% accuracy and minimal cues.      Progressing/ Not Met 4/27/2023   Not formally addressed.    Patient will summarize read material to improve word fluency, word finding, and reading comprehension in Attentive Reading and Constrained Summarization (ARCS) protocol with 80% accuracy and minimal verbal assistance across 2-3 sessions.     Progressing/ Not Met 4/27/2023   Not formally addressed.      Patient will write 7-10 word sentences with no more than 2 syntactic/grammatical errors given minimal cueing.      Progressing/ Not Met 4/27/2023   Not formally addressed.      Patient will complete functional writing task with complete, coherent, and accurately spelled responses with 80% accuracy and minimal verbal and visual assistance across 3-5 sessions.     Progressing/ Not Met 4/27/2023   Not formally addressed.      Patient will complete RBANS assessment to further assess cognitive communication skills.        GOAL MET 2/27/2023  Completed today. See below for results.       GOAL MET 2/27/2023    Patient will sustain attention to a moderate task (auditory or visual) for 2 minutes with 90% accuracy or no more than 1 redirection.     Visual: GOAL MET 3/20/2023  Auditory: GOAL MET 3/20/2023  VISUAL:   Task 1: Visual sustained attention task completed in which patient visually scanned an article looking for certain letters. She completed this task with 87% accuracy independently (36/41). Clinician declan a purple line on the left hand side to serve an anchor. Patient utilized a blank paper to keep track of which line she was working on.     Task 2: Visual sustained attention task completed in which patient visually scanned a phone bill looking for unexplained calls. She completed this task with 90% accuracy given minimal cueing. The one error that she made was due to skipping over an area  code that was more offset to the left than the others. Clinician and patient dicussed left neglect in depth that is likely due to her first cerebral vascular accident (right cerebral vascular accident).     AUDITORY:  Task 1: Constant Therapy: Understanding Voicemails was completed today with 90% accuracy independently (improvement from last session with 70% accuracy). She required minimal assistance for navigating the application (pressing the next button, replaying the voicemail)    GOAL MET 3/20/2023    Patient will complete selective attention tasks (auditory or visual) with 90% accuracy independently increase selective attention.    Visual: GOAL MET 4/6/2023  Auditory: Met x1 Visual:   Medication management task. Patient was asked to sort three medications into a weekly pill box using the instructions provided on each pill bottle. She completed this task with 92% accuracy (correctly sorted 26 out of 28 individual pills) independently. Clinician and patient discussed the two missed pills and potential negative consequences of missing medication.     Auditory:  Patient completed auditory selective attention task of immediate recall of simple sentences with natural background noise (Constant Therapy: repeat active sentences Level 2). She completed this task with 97% accuracy.     Patient completed auditory selective attention task of immediate recall of complex sentences with natural background noise (Constant Therapy: repeat complex sentences Level 1). She completed this task with 88% accuracy given 2-4 repetitions of stimuli as well as consistent verbal cueing from clinician to use strategies (I.e. visualization/taking notes). Decreased need for repetitions today.      Patient will use attention shifting strategies to shift attention between two tasks (auditory or visual) with no more than 3 cues or 90% accuracy to improve alternating attention. Visual:   Patient completed visual alternating attention task of  following directions on one page and carrying out those directions on a separate page. She required consistent verbal cueing to complete the task with 100% accuracy. She rated this task a 5 on the relative scale of cognitive load.        Patient will complete short term recall tasks after a 5 minutes delay with 90% accuracy  independently  with use of memory strategies to improve recall of information and generalization of memory strategies. Not formally addressed.    Patient will use Goal Plan Action Review strategy to complete moderate to complex reasoning, planning, or organization tasks with 90% accuracy independently to improve functional executive function skills. Not formally addressed.    Patient will complete a functional task to improve attention, memory and/or executive functions (I.e. sample bill paying activity, recipe, or multiple choice comprehension questions to 1 paragraphs) with 80% accuracy and natural environment noise distractions (TV news background, music, etc.). Not formally addressed.    Patient will be educated regarding cognitive deficits as applicable to the patient's life for increased awareness and will execute returned demonstration of information with 80% accuracy.           GOAL MET 3/6/2023  Education provided regarding results from her assessment, the hierarchy of neuropsychological functions, and the role of attention in other executive functions. Patient and granddaughter verbalized understanding.     GOAL MET 3/6/2023    Patient will independently generate strategies to improve ability to complete tasks with enhanced accuracy and time, based on review of objective previous performance on trials of functional tasks with 80% accuracy.  Not formally addressed.    Given implementation of strategies, patient will complete a task following initial attempt with 90% accuracy and reported reduced number on the relative scale of cognitive load when compared to previous trial.  Not  formally addressed.    Patient will use external memory strategies in order to recall important dates, events, appointments, or tasks to be completed with 90% accuracy independently.  Not formally addressed.        Patient Education/Response:   Education provided on updated plan of care. Patient and granddaughter verbalized understanding of all discussed.     Home program established: yes-continue prior HEP.   Exercises were reviewed and Leslie was able to demonstrate them prior to the end of the session.  Leslie demonstrated good  understanding of the education provided.     See Electronic Medical Record under Patient Instructions for exercises provided throughout therapy.  Assessment:   Patient is very motivated to improve and participates in all therapy tasks presented. Steady improvements observed with immediate recall of auditory stimuli. Decreased accuracy observed with increased complexity of task. She will benefit from continued speech therapy to address the above deficits and to utilize strategies as needed.     Leslie is progressing well towards her goals.Current goals remain appropriate. Goals will be updated as needed.     Patient prognosis is Good. Patient will continue to benefit from skilled outpatient speech and language therapy to address the deficits listed in the problem list on initial evaluation, provide patient/family education and to maximize patient's level of independence in the home and community environment.     Medical necessity is demonstrated by the following IMPAIRMENTS:  Language deficits impact overall quality of life, ability to participation in social and community interactions, and the ability to communicate important medical information if needed.   Cognitive-communication deficits impact overall quality of life and the ability to safely and independently complete activities of daily living.     Barriers to Therapy: none   Patient's spiritual, cultural and educational needs considered  and patient agreeable to plan of care and goals.  Plan:   Continue Plan of Care with focus on sustained/selective attention for functional daily tasks.     Becca Blanco, CCC-SLP, CBIS   Speech Language Pathologist   Certified Brain Injury Specialist   4/27/2023

## 2023-04-30 ENCOUNTER — EXTERNAL CHRONIC CARE MANAGEMENT (OUTPATIENT)
Dept: PRIMARY CARE CLINIC | Facility: CLINIC | Age: 83
End: 2023-04-30
Payer: MEDICARE

## 2023-04-30 PROCEDURE — 99439 PR CHRONIC CARE MGMT, EA ADDTL 20 MIN: ICD-10-PCS | Mod: S$PBB,,, | Performed by: FAMILY MEDICINE

## 2023-04-30 PROCEDURE — 99490 CHRNC CARE MGMT STAFF 1ST 20: CPT | Mod: PBBFAC,25 | Performed by: FAMILY MEDICINE

## 2023-04-30 PROCEDURE — 99490 PR CHRONIC CARE MGMT, 1ST 20 MIN: ICD-10-PCS | Mod: S$PBB,,, | Performed by: FAMILY MEDICINE

## 2023-04-30 PROCEDURE — 99439 CHRNC CARE MGMT STAF EA ADDL: CPT | Mod: S$PBB,,, | Performed by: FAMILY MEDICINE

## 2023-04-30 PROCEDURE — 99439 CHRNC CARE MGMT STAF EA ADDL: CPT | Mod: PBBFAC,27 | Performed by: FAMILY MEDICINE

## 2023-04-30 PROCEDURE — 99490 CHRNC CARE MGMT STAFF 1ST 20: CPT | Mod: S$PBB,,, | Performed by: FAMILY MEDICINE

## 2023-05-01 ENCOUNTER — CLINICAL SUPPORT (OUTPATIENT)
Dept: REHABILITATION | Facility: HOSPITAL | Age: 83
End: 2023-05-01
Payer: MEDICARE

## 2023-05-01 DIAGNOSIS — Z74.09 IMPAIRED FUNCTIONAL MOBILITY, BALANCE, GAIT, AND ENDURANCE: Primary | ICD-10-CM

## 2023-05-01 DIAGNOSIS — R47.01 APHASIA: Primary | ICD-10-CM

## 2023-05-01 DIAGNOSIS — R41.841 COGNITIVE COMMUNICATION DISORDER: ICD-10-CM

## 2023-05-01 PROCEDURE — 97110 THERAPEUTIC EXERCISES: CPT

## 2023-05-01 PROCEDURE — 97140 MANUAL THERAPY 1/> REGIONS: CPT

## 2023-05-01 PROCEDURE — 97130 THER IVNTJ EA ADDL 15 MIN: CPT

## 2023-05-01 PROCEDURE — 97129 THER IVNTJ 1ST 15 MIN: CPT

## 2023-05-01 NOTE — PROGRESS NOTES
[facility]  Speech Therapy Treatment Note- Neurological Rehabilitation    Date: 5/1/2023     Name: Leslie Wood   MRN: [id number]   Therapy Diagnosis:   Encounter Diagnoses   Name Primary?    Aphasia Yes    Cognitive communication disorder      Physician: Sammy Juarez MD  Physician Orders: [id number] - AMB REFERRAL/CONSULT TO SPEECH THERAPY  Medical Diagnosis: R47.01 (ICD-10-CM) - Aphasia    Visit #/ Visits Authorized: 15/20  Date of Evaluation:  2/17/2023   Insurance Authorization Period: 2/23/2023 - 12/31/2023   Plan of Care Expiration Date: 6/26/2023   Extended Plan of Care:  NA   Progress Note: 5/18/2023  Visits Cancelled: 0  Visits No Show: 0    Time In:  10:00 AM   Time Out: 10:45 AM  TOTAL TIME:  45 minutes      Precautions: Standard  Subjective:   Patient reports: She has not had to take a pain pill for back in a while.   She was compliant to home exercise program.     Response to previous treatment: Continued progress towards visual selective attention.     Pain Scale:  8/10 on a Visual Analog Scale currently.   Pain Location:  mid-back  Objective:   TIMED  Procedure Min.   Cognitive Therapeutic Interventions, first 15 minutes CPT 81778  15   Cognitive Therapeutic Interventions, each additional 15 minutes CPT 26559  30         UNTIMED  Procedure Min.   Speech- Language- Voice Therapy  0     Total Timed Units: 3  Total Untimed Units: 0  Charges Billed/Number of units: 88643/1; 40058/2    Short Term Goals: (6 weeks) Current Progress:   Patient will describe functional objects/images to facilitate a strategy of description in order to increase transfer of intended message with 80% accuracy and moderate clinician cueing.     Progressing/ Not Met 5/1/2023   Not formally addressed.     Patient will complete word finding task (i.e. Creating subject, verb, object pairs in VNEST protocol) with 90% accuracy moderate assistance to improve word fluency.     Met x2 previously     Progressing/ Not Met 5/1/2023    Not formally addressed     Patient will utilize word finding strategies in structured tasks with 70% accuracy and minimal cues.      Progressing/ Not Met 5/1/2023   Not formally addressed.    Patient will summarize read material to improve word fluency, word finding, and reading comprehension in Attentive Reading and Constrained Summarization (ARCS) protocol with 80% accuracy and minimal verbal assistance across 2-3 sessions.     Progressing/ Not Met 5/1/2023   Not formally addressed.      Patient will write 7-10 word sentences with no more than 2 syntactic/grammatical errors given minimal cueing.      Progressing/ Not Met 5/1/2023   Not formally addressed.      Patient will complete functional writing task with complete, coherent, and accurately spelled responses with 80% accuracy and minimal verbal and visual assistance across 3-5 sessions.     Progressing/ Not Met 5/1/2023   Not formally addressed.      Patient will complete RBANS assessment to further assess cognitive communication skills.        GOAL MET 2/27/2023  Completed today. See below for results.       GOAL MET 2/27/2023    Patient will sustain attention to a moderate task (auditory or visual) for 2 minutes with 90% accuracy or no more than 1 redirection.     Visual: GOAL MET 3/20/2023  Auditory: GOAL MET 3/20/2023  VISUAL:   Task 1: Visual sustained attention task completed in which patient visually scanned an article looking for certain letters. She completed this task with 87% accuracy independently (36/41). Clinician declan a purple line on the left hand side to serve an anchor. Patient utilized a blank paper to keep track of which line she was working on.     Task 2: Visual sustained attention task completed in which patient visually scanned a phone bill looking for unexplained calls. She completed this task with 90% accuracy given minimal cueing. The one error that she made was due to skipping over an area code that was more offset to the left  than the others. Clinician and patient dicussed left neglect in depth that is likely due to her first cerebral vascular accident (right cerebral vascular accident).     AUDITORY:  Task 1: Constant Therapy: Understanding Voicemails was completed today with 90% accuracy independently (improvement from last session with 70% accuracy). She required minimal assistance for navigating the application (pressing the next button, replaying the voicemail)    GOAL MET 3/20/2023    Patient will complete selective attention tasks (auditory or visual) with 90% accuracy independently increase selective attention.    Visual: GOAL MET 4/6/2023  Auditory: Met x1 Visual:   Medication management task. Patient was asked to sort three medications into a weekly pill box using the instructions provided on each pill bottle. She completed this task with 92% accuracy (correctly sorted 26 out of 28 individual pills) independently. Clinician and patient discussed the two missed pills and potential negative consequences of missing medication.     Auditory:  Patient completed auditory selective attention task of immediate recall of simple sentences with natural background noise (Constant Therapy: repeat active sentences Level 1). She completed this task with 95% accuracy.     Patient completed auditory selective attention task of immediate recall of simple sentences with natural background noise (Constant Therapy: repeat active sentences Level 2). She completed this task with 95% accuracy.     Patient completed auditory selective attention task of immediate recall of complex sentences with natural background noise (Constant Therapy: repeat complex sentences Level 1). She completed this task with 66% accuracy given 2-4 repetitions of stimuli as well as consistent verbal cueing from clinician to use strategies (I.e. visualization/taking notes). Decreased need for repetitions today.      Patient will use attention shifting strategies to shift  attention between two tasks (auditory or visual) with no more than 3 cues or 90% accuracy to improve alternating attention. Visual:   Patient completed visual alternating attention task of following directions on one page and carrying out those directions on a separate page. She completed this task with 75% accuracy given minimal cueing.        Patient will complete short term recall tasks after a 5 minutes delay with 90% accuracy  independently  with use of memory strategies to improve recall of information and generalization of memory strategies. Not formally addressed.    Patient will use Goal Plan Action Review strategy to complete moderate to complex reasoning, planning, or organization tasks with 90% accuracy independently to improve functional executive function skills. Not formally addressed.    Patient will complete a functional task to improve attention, memory and/or executive functions (I.e. sample bill paying activity, recipe, or multiple choice comprehension questions to 1 paragraphs) with 80% accuracy and natural environment noise distractions (TV news background, music, etc.). Not formally addressed.    Patient will be educated regarding cognitive deficits as applicable to the patient's life for increased awareness and will execute returned demonstration of information with 80% accuracy.           GOAL MET 3/6/2023  Education provided regarding results from her assessment, the hierarchy of neuropsychological functions, and the role of attention in other executive functions. Patient and granddaughter verbalized understanding.     GOAL MET 3/6/2023    Patient will independently generate strategies to improve ability to complete tasks with enhanced accuracy and time, based on review of objective previous performance on trials of functional tasks with 80% accuracy.  Not formally addressed.    Given implementation of strategies, patient will complete a task following initial attempt with 90% accuracy and  reported reduced number on the relative scale of cognitive load when compared to previous trial.  Not formally addressed.    Patient will use external memory strategies in order to recall important dates, events, appointments, or tasks to be completed with 90% accuracy independently.  Not formally addressed.        Patient Education/Response:   Education provided on plan for continued therapy. Patient and granddaughter verbalized understanding of all discussed.     Home program established: yes-continue prior HEP.   Exercises were reviewed and Leslie was able to demonstrate them prior to the end of the session.  Leslie demonstrated good  understanding of the education provided.     See Electronic Medical Record under Patient Instructions for exercises provided throughout therapy.  Assessment:   Patient is very motivated to improve and participates in all therapy tasks presented. Steady improvements observed with immediate recall of auditory stimuli. Decreased accuracy observed with increased complexity of task. She will benefit from continued speech therapy to address the above deficits and to utilize strategies as needed.     Leslie is progressing well towards her goals.Current goals remain appropriate. Goals will be updated as needed.     Patient prognosis is Good. Patient will continue to benefit from skilled outpatient speech and language therapy to address the deficits listed in the problem list on initial evaluation, provide patient/family education and to maximize patient's level of independence in the home and community environment.     Medical necessity is demonstrated by the following IMPAIRMENTS:  Language deficits impact overall quality of life, ability to participation in social and community interactions, and the ability to communicate important medical information if needed.   Cognitive-communication deficits impact overall quality of life and the ability to safely and independently complete activities of  daily living.     Barriers to Therapy: none   Patient's spiritual, cultural and educational needs considered and patient agreeable to plan of care and goals.  Plan:   Continue Plan of Care with focus on sustained/selective attention for functional daily tasks.     Becca Blanco, CCC-SLP, CBIS   Speech Language Pathologist   Certified Brain Injury Specialist   5/1/2023

## 2023-05-01 NOTE — PROGRESS NOTES
[facility]   Physical Therapy Treatment Note + UPOC    Name: Leslie CASTELLON Israel  [facility] Number: [id number]    Therapy Diagnosis:   Encounter Diagnosis   Name Primary?    Impaired functional mobility, balance, gait, and endurance Yes     Physician: Sepideh Peters,*    Visit Date: 5/1/2023    Physician Orders: PT Eval and Treat   Medical Diagnosis from Referral: R29.6 (ICD-10-CM) - Falls frequently  Evaluation Date: 2/27/2023  Authorization Period Expiration: 2/17/24  Plan of Care Expiration: 4/28/23  Progress Note Due: 5/6/23  Visit # / Visits authorized: 12/12 (+eval)  FOTO: 0/3 not appropriate due to poor insight and cognitive deficits     Precautions: Standard and Fall     PTA Visit #: 0/5     Time In: 1045  Time Out: 1145  Total Billable Time: 60 minutes    SUBJECTIVE     Pt reports: Patient reports that she feels as though she is improving.  She has started using a standard cane with supervision and reports that she feels as though she is walking more upright.  She does report of pain relief at the lower back region.  She also reports that she is wearing a knee brace at the right knee which also helps to decrease pain.    She reports that she was not compliant with Home Exercise Program as she left it in her granddaughter's car.   Response to previous treatment: She responded well to last session.   Functional change: She reports no functional change since last session.     Pain: 4/10  Location: right knee and lower back    OBJECTIVE     Objective Measures updated at progress report unless specified.   Functional Assessments:  Test Eval Score 3/30/23 4/27/23   TUG 15.35 seconds 13.98 seconds NT due to patient with wedged heel shoes today   5 x Sit to Stand 23.55 seconds 14.14 seconds 12.45 seconds   4 Square Step Test 26 seconds NT NT due to patient with wedged heel shoes today         Patient reports a score of 46/56 on the Encarnacion Balance Assessment on 4/27/23.              Treatment      Leslie received the treatments listed below:      THERAPEUTIC EXERCISES to develop strength, endurance, ROM, flexibility, posture, and core stabilization for (30) minutes including:    Intervention Performed Today    Supine Stretches   X  X  x - 3x30 seconds bilateral for each:  - lower trunk rotation   - single knee to chest  - hamstring/ heelcord   Seated Stretches  - 3x30 seconds bilateral for each:  - hamstring/ heelcord  - hip adductor   Standing Stretches  - 3x15 seconds bilateral for each:  - IT band   - heelcord    Seated lower extremity exercises          - hip flexion 2x10 bilateral   - hamstring curl 2x10 bilateral   - dorsiflexion/ plantarflexion 2x10 bilateral with green band  - eversion 1x10 bilateral   - toe curls and flares 1x20 bilateral   - hip adduction against ball 1x10 with 3 second hold  - hip abduction against belt 1x10 with 3 second hold   Standing lower extremity exercises  X  X  X  x - hip flexion 2x10 bilateral   - hip abduction 2x10 bilateral   - hip extension 2x10 bilateral   - heel raise 2x10    NuStep for endurance x - Level 2, 6 minutes   Bicycle for endurance and ROM  - Level 1 x 6 minutes   Supine x - Quad set with short arc quad 2x10 with 3 second hold   - dorsiflexion against green band 2x10 bilateral    Lower Trunk Rotations  - 2x10    Pelvic tilts x 3 seconds hold 1x10   Bridges   1x10   Abdominal Bracing x Knee to chest - 1x10 bilateral    Straight leg raise   - 1x10 bilateral    Neoprene wrap to Right knee for all exercises    neuromuscular re-education activities to improve: Balance, Coordination, Kinesthetic, Sense, Proprioception, and Posture for (0) minutes. The following activities were included:    Intervention Performed Today    Air Ex Beam        - Tandem holds 5 seconds, 3x's   - Tandem walking forward/backward 3x's each  - Side stepping 3x's each direction  - tandem walking over hurdles forward and side stepping 2x's each  - heel lifts and toe lifts off of beam  with 2-0 hand supports   - single leg standing while tapping opposite foot forward and back on beam   Orange Rubber Beam    - Tandem holds 3 seconds, 3x's bilateral   - Tandem walking forward 4x's   - Side stepping 2x's each direction  - single leg standing while tapping opposite foot forward and back on beam   Air Ex Pad              - Weight shifts Left to Right 2x10  - Weight shifts forward/back 2x10  - single leg standing holds   - Eyes closed 10 seconds  - backward pivot steps with 1 hand support 1x8 bilateral   - standing on pad while toss/catching ball to wall 1x8  - One lower extremity on pad and opposite lower extremity on step tossing ball to wall    Hand paddled per Neuro LEONIE technique and for entirety of session     Core Exercises in supine  Bilateral lower extremities over Tball for each:   - lower trunk rotation 2x10  - bilateral knees to chest 2x10  - posterior pelvic tilt to bridge 2x10   Parallel bars      - walking backward 4x8 ft with 2-1 hand support  - braided walking 4x's length of bars  - narrow base standing without hand support performing head turns/nods 1x5 each, arm reach/lifts 1x5 each   Trunk extension for posture  - 2x10 over pillow and ball at low back    Sit to squat without hand support  2x10 from progressively lowered mat   Step ups  6 inch step  - 2 sets of 1 minute on each leg   Functional Re-Tests  - see above   Encarnacion Balance Scale  - see above   Agility Drills on Turf  - side stepping 2x20 ft  - backward walking 2x20 ft - without shoes  - tandem walking 2x15 ft - without shoes       THERAPEUTIC ACTIVITIES to improve dynamic and functional performance for () minutes including:    Intervention Performed Today    Patient and her granddaughter educated on Home Exercise Program for balance.  Also educated on wearing tennis shoes to future sessions.  Discussed the process of changing schedule to get her seen by an ortho PT 1 day/week then by me 1day/week  - Patient and granddaughter  verbalized good understanding of education provided.                                              MANUAL THERAPY TECHNIQUES were applied for (30) minutes, including:    Manual Intervention Performed Today    Soft Tissue Mobilization x Bilateral thoracolumbar spine   Joint Mobilizations [x] PIVM lumbar spine    []     []    Functional Dry Needling  [x] Bilateral thoracolumbar spine     Plan for Next Visit: Continue as needed         Gait Training: to improve functional mobility and safety for  () minutes, including:  - working on increasing step length and upright posture with gait sequence, ambulating 200ft and 100ft with contact guard assist for safety awareness.     Patient Education and Home Exercises     Home Exercises Provided and Patient Education Provided     Education provided:   - 3/14/23 Patient educated on Home Exercise Program for balance   - 3/16/23 Patient encouraged to stay compliant with Home Exercise Program   - 4/27/23 discussed continuing with James PT for dry needling 1 day/week for the next 4 weeks.   Written Home Exercises Provided: yes. Exercises were reviewed and Leslie was able to demonstrate them prior to the end of the session.  Leslie verbalized good  understanding of the education provided. See EMR under Patient Instructions for exercises provided during therapy sessions    ASSESSMENT     Patient reports that she experiences more right sided thoracic spine pain today.  Performed FDN into the region and did report of pain relief.  Working toward discharge in upcoming weeks.  Would like to find a [email or web address] or similar program for the patient to be involved in.    Leslie Is progressing well towards her goals.   Pt prognosis is Good.     Pt will continue to benefit from skilled outpatient physical therapy to address the deficits listed in the problem list box on initial evaluation, provide pt/family education and to maximize pt's level of independence in the home and community environment.      Pt's spiritual, cultural and educational needs considered and pt agreeable to plan of care and goals.     Anticipated barriers to physical therapy: co-morbidities, sedentary lifestyle, chronicity of condition, and lack of understanding of condition     GOALS:     Short Term Goals:  4 weeks Progress    Function: Patient will demonstrate improved function as indicated by a functional limitation score improved by 3 points from initial measure.  PC   Strength: Patient will demonstrate improved strength by performing 5x sit to  20 seconds in order to progress towards independence with functional activities.  Met   Balance: Complete Encarnacion Balance Scale and set goals accordingly.  Met   Coordination: Patient will demonstrate improved time of 13 sec on TUG to demonstrate improved coordination and safety with mobility. PC   HEP: Patient will demonstrate independence with HEP in order to progress toward functional independence.  Met   Determine the need of Custom Wheelchair Eval to improve patient's positioning and independence with pressure reliefs and independence with home and community mobility to decrease burden of care. D/C not needed at this time.              Long Term Goals:  8 weeks Progress   Function: Patient will demonstrate improved function as indicated by a functional limitation score improved 5 points from initial measure.  PC   Strength: Patient will demonstrate improved strength by performing 5x sit to  17 seconds in order to progress towards independence with functional activities.  Met   Patient will return to ADL's, IADL's, community involvement, recreational activities, and work-related activities with less than or equal to 5/10 pain and maximal function.  PC   Balance: Improve Encarnacion Balance score to 50/56 to demonstrate improved balance and decreased risk of falls.  PC   Coordination: Patient will demonstrate improved time of 11 sec on TUG to demonstrate improved coordination and  safety with mobility. PC   HEP: Patient educated on HEP in preparation for d/c verbalizing and demonstrating good understanding of topics discussed.   PC            Goals Key:  PC= progressing/continue;        PM= partially met;             DC= discontinue    PLAN     Continue Plan of Care (POC) and progress per patient tolerance. See Treatment section for exercise progression.    Leoncio Lujan, PT

## 2023-05-04 ENCOUNTER — OFFICE VISIT (OUTPATIENT)
Dept: CARDIOLOGY | Facility: CLINIC | Age: 83
End: 2023-05-04
Payer: MEDICARE

## 2023-05-04 ENCOUNTER — OFFICE VISIT (OUTPATIENT)
Dept: DERMATOLOGY | Facility: CLINIC | Age: 83
End: 2023-05-04
Payer: MEDICARE

## 2023-05-04 VITALS
HEIGHT: 61 IN | OXYGEN SATURATION: 97 % | BODY MASS INDEX: 25.56 KG/M2 | WEIGHT: 135.38 LBS | DIASTOLIC BLOOD PRESSURE: 64 MMHG | HEART RATE: 80 BPM | SYSTOLIC BLOOD PRESSURE: 108 MMHG

## 2023-05-04 DIAGNOSIS — L81.4 SOLAR LENTIGO: ICD-10-CM

## 2023-05-04 DIAGNOSIS — D18.00 HEMANGIOMA, UNSPECIFIED SITE: ICD-10-CM

## 2023-05-04 DIAGNOSIS — R00.2 PALPITATIONS: Primary | ICD-10-CM

## 2023-05-04 DIAGNOSIS — Z12.83 SKIN CANCER SCREENING: ICD-10-CM

## 2023-05-04 DIAGNOSIS — L57.0 ACTINIC KERATOSIS: Primary | ICD-10-CM

## 2023-05-04 DIAGNOSIS — I10 ESSENTIAL HYPERTENSION: Chronic | ICD-10-CM

## 2023-05-04 DIAGNOSIS — L82.1 SEBORRHEIC KERATOSES: ICD-10-CM

## 2023-05-04 DIAGNOSIS — Q24.5 CORONARY-MYOCARDIAL BRIDGE: ICD-10-CM

## 2023-05-04 DIAGNOSIS — G45.9 TIA (TRANSIENT ISCHEMIC ATTACK): ICD-10-CM

## 2023-05-04 PROCEDURE — 99203 OFFICE O/P NEW LOW 30 MIN: CPT | Mod: S$PBB,,, | Performed by: STUDENT IN AN ORGANIZED HEALTH CARE EDUCATION/TRAINING PROGRAM

## 2023-05-04 PROCEDURE — 99203 PR OFFICE/OUTPT VISIT, NEW, LEVL III, 30-44 MIN: ICD-10-PCS | Mod: S$PBB,,, | Performed by: STUDENT IN AN ORGANIZED HEALTH CARE EDUCATION/TRAINING PROGRAM

## 2023-05-04 PROCEDURE — 99214 PR OFFICE/OUTPT VISIT, EST, LEVL IV, 30-39 MIN: ICD-10-PCS | Mod: S$PBB,,, | Performed by: INTERNAL MEDICINE

## 2023-05-04 PROCEDURE — 99214 OFFICE O/P EST MOD 30 MIN: CPT | Mod: PBBFAC,27 | Performed by: INTERNAL MEDICINE

## 2023-05-04 PROCEDURE — 99213 OFFICE O/P EST LOW 20 MIN: CPT | Mod: PBBFAC,27 | Performed by: STUDENT IN AN ORGANIZED HEALTH CARE EDUCATION/TRAINING PROGRAM

## 2023-05-04 PROCEDURE — 99999 PR PBB SHADOW E&M-EST. PATIENT-LVL IV: CPT | Mod: PBBFAC,,, | Performed by: INTERNAL MEDICINE

## 2023-05-04 PROCEDURE — 99999 PR PBB SHADOW E&M-EST. PATIENT-LVL IV: ICD-10-PCS | Mod: PBBFAC,,, | Performed by: INTERNAL MEDICINE

## 2023-05-04 PROCEDURE — 99214 OFFICE O/P EST MOD 30 MIN: CPT | Mod: S$PBB,,, | Performed by: INTERNAL MEDICINE

## 2023-05-04 PROCEDURE — 99999 PR PBB SHADOW E&M-EST. PATIENT-LVL III: CPT | Mod: PBBFAC,,, | Performed by: STUDENT IN AN ORGANIZED HEALTH CARE EDUCATION/TRAINING PROGRAM

## 2023-05-04 PROCEDURE — 99999 PR PBB SHADOW E&M-EST. PATIENT-LVL III: ICD-10-PCS | Mod: PBBFAC,,, | Performed by: STUDENT IN AN ORGANIZED HEALTH CARE EDUCATION/TRAINING PROGRAM

## 2023-05-04 NOTE — PROGRESS NOTES
Subjective:   Patient ID:  Leslie Wood is a 82 y.o. female who presents for evaluation of Dizziness    05/04/2023.    Comes in for evaluation for loop recorder.    She had seen neurology.    She had the 14 days monitor did not show any arrhythmias.    She does not have any palpitations.    She is wobbly on her walking.    She is bruising very easily with aspirin.    Her CT scan did not show multiple areas of ischemia to suspect embolic stroke.    She also has history with reveal stricture and 80 is not possible for her.    She states that when she wore the monitor she was and 3 where in the whole time and she is not sure about the quality of the monitor and she also states that she missed the new monitor that was sent to her house.      1.31.2023  She presented last month to the hospital with syncope.  Her beta-blocker dose was decreased.    And was switched from carvedilol to Toprol 25 mg daily.    In the last 2 days she has been out of her metoprolol.    She states that she is awaiting an appointment in March for evaluation for knee replacement.    She denies any more chest pain.    Her repeat echocardiogram in the hospital showed recovery of her LV EF from 30-40% to 60%.    She denies any dyspnea, chest pain, palpitations, syncope presyncope.        7.27.2022  Comes in for a follow up after recent visit to the ED  She states after she took the first dose of recently prescribed BELBUCA for back pain, she felt immediately dyspnea, resolved in the ED  She had no changes on ekg and two negative troponins with an elevated BNP of 200   She has been off the lasix, baseline BNP is normal   She does reports FUENTES NYHA 1-2 lately, but no orthopnea and no leg swelling     6.6.2022  Comes in for follow-up.  She was admitted twice to the hospital last month with chest pain.  Had 2 heart catheterization.  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  Her EF is low.  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  She denies any more chest pain.  Her memory loss has been worse as per prior visit with her daughter in the hospital.  No bleeding  Also seen Dr. Carl for 2nd opinion    Interval hx: 2/11/2021   Complains of occipital headache  No chest pain, no dyspnea  States she is doing rehab and sometimes her BP is 200   Not taking lasix. On imdur  Esr came back normal after last visit       Initial HPI: 10/1/2020 Patient 80-year-old, prediabetic, with history of hypertension, who recently had a stroke about 3 months ago and was hospitalized at [facility].  She does not recall what kind of cardiac workup she underwent while at that hospital.  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  Her left-sided weakness has improved since the stroke.  She is undergoing rehab.  And should undergo speech therapy soon as she states.  She reports compliance with her blood pressure medications, however she reports feeling dizzy when she stands up, she does not get dizzy or any other circumstances.  She states that she is not drinking enough water.  Also she complains of bilateral lower extremity mild swelling that comes on and off when she takes or not take her amlodipine.  She denies any chest pain, dyspnea, orthopnea, PND, palpitations, syncope, presyncope, bleeding.                Past Medical History:   Diagnosis Date    Arthritis     Cataract     GERD (gastroesophageal reflux disease)     Heart attack 05/18/2022    Heart attack 05/23/2022    Hyperlipidemia     Hypertension     Stroke        Past Surgical History:   Procedure Laterality Date    ADENOIDECTOMY      ADRENAL GLAND SURGERY      APPENDECTOMY      BACK SURGERY      fusion l 4-5 s 1,2,3  fusion l 2-3    CORONARY ANGIOGRAPHY N/A 5/20/2022    Procedure: ANGIOGRAM, CORONARY ARTERY;  Surgeon: Domingo Martins MD;  Location: [facility] CATH LAB;  Service: Cardiology;  Laterality: N/A;    CORONARY ANGIOGRAPHY  N/A 5/24/2022    Procedure: ANGIOGRAM, CORONARY ARTERY;  Surgeon: Domingo Martins MD;  Location: [facility] CATH LAB;  Service: Cardiology;  Laterality: N/A;    EYE SURGERY      HEMORRHOID SURGERY      HERNIA REPAIR      HYSTERECTOMY      indirect lumbar decompression      percutaneous placement of interspinous extension blocker    LEFT HEART CATHETERIZATION Left 5/20/2022    Procedure: CATHETERIZATION, HEART, LEFT;  Surgeon: Domingo Martins MD;  Location: [facility] CATH LAB;  Service: Cardiology;  Laterality: Left;    TONSILLECTOMY         Social History     Tobacco Use    Smoking status: Never    Smokeless tobacco: Never   Substance Use Topics    Alcohol use: No    Drug use: No       Family History   Problem Relation Age of Onset    Heart disease Mother     Hypertension Mother     Diabetes Mother     Hypertension Father     Kidney disease Father     Breast cancer Sister        Review of Systems   Constitutional: Negative for fever and malaise/fatigue.   HENT: Negative for sore throat.    Eyes: Negative for blurred vision.   Cardiovascular: Negative for chest pain, claudication, cyanosis, dyspnea on exertion, irregular heartbeat, leg swelling, near-syncope, orthopnea, palpitations, paroxysmal nocturnal dyspnea and syncope.   Respiratory: Negative for cough, hemoptysis and shortness of breath.    Hematologic/Lymphatic: Negative for bleeding problem.   Skin: Negative for poor wound healing and rash.   Musculoskeletal: Negative for back pain and falls.   Gastrointestinal: Negative for abdominal pain.   Genitourinary: Negative for nocturia.   Neurological: Positive for headaches. Negative for light-headedness and loss of balance.   Psychiatric/Behavioral: Negative for altered mental status and substance abuse.       Current Outpatient Medications on File Prior to Visit   Medication Sig    aspirin (ECOTRIN) 81 MG EC tablet Take 1 tablet (81 mg total) by mouth once daily.    atorvastatin (LIPITOR) 80 MG tablet Take 0.5 tablets  (40 mg total) by mouth every morning.    clopidogreL (PLAVIX) 75 mg tablet Take 1 tablet (75 mg total) by mouth once daily. for 21 days    DULoxetine (CYMBALTA) 30 MG capsule Take 30 mg by mouth once daily.    furosemide (LASIX) 20 MG tablet Take 1 tablet (20 mg total) by mouth once daily.    losartan (COZAAR) 50 MG tablet Take 1 tablet (50 mg total) by mouth 2 (two) times a day.    metoprolol succinate (TOPROL-XL) 25 MG 24 hr tablet Take 1 tablet (25 mg total) by mouth once daily.    NIFEdipine (ADALAT CC) 30 MG TbSR Take 1 tablet (30 mg total) by mouth once daily.    NUCYNTA 50 mg Tab Take 1 tablet (50 mg total) by mouth every 8 (eight) hours as needed (severe pain). RESTART WHEN OK PER PAIN MANAGEMENT PROVIDER    tiZANidine (ZANAFLEX) 4 MG tablet     [DISCONTINUED] carvediloL (COREG) 6.25 MG tablet Take 1 tablet (6.25 mg total) by mouth 2 (two) times daily. TAKE (1) TABLET BY MOUTH 2 TIMES A DAY WITH MEALS    [DISCONTINUED] cloNIDine (CATAPRES) 0.1 MG tablet Take 1 tablet (0.1 mg total) by mouth 2 (two) times daily. To take an extra dose if BP >160/90    [DISCONTINUED] diclofenac sodium (VOLTAREN) 1 % Gel Apply 2 g topically 3 (three) times daily.    [DISCONTINUED] isosorbide mononitrate (IMDUR) 30 MG 24 hr tablet TAKE 1 TABLET BY MOUTH TWICE A DAY    [DISCONTINUED] metoprolol tartrate (LOPRESSOR) 25 MG tablet Take 1 tablet (25 mg total) by mouth 3 (three) times daily.    [DISCONTINUED] nitroGLYCERIN (NITROSTAT) 0.4 MG SL tablet Place 1 tablet (0.4 mg total) under the tongue every 5 (five) minutes as needed for Chest pain.    [DISCONTINUED] valsartan (DIOVAN) 160 MG tablet TAKE 1 TABLET BY MOUTH TWICE A DAY     No current facility-administered medications on file prior to visit.       Objective:   Objective:  Wt Readings from Last 3 Encounters:   05/04/23 61.4 kg (135 lb 5.8 oz)   04/27/23 60.7 kg (133 lb 13.1 oz)   04/03/23 59 kg (130 lb 1.1 oz)     Temp Readings from Last 3 Encounters:   03/08/23 97.8 °F (36.6  °C) (Oral)   03/02/23 97.6 °F (36.4 °C) (Tympanic)   02/09/23 97.5 °F (36.4 °C) (Temporal)     BP Readings from Last 3 Encounters:   05/04/23 108/64   04/03/23 104/63   03/13/23 (!) 144/75     Pulse Readings from Last 3 Encounters:   05/04/23 80   04/03/23 76   03/13/23 68       Physical Exam  Vitals reviewed.   Constitutional:       Appearance: She is well-developed.   HENT:      Head: Normocephalic and atraumatic.   Eyes:      General: No scleral icterus.     Conjunctiva/sclera: Conjunctivae normal.   Cardiovascular:      Rate and Rhythm: Normal rate and regular rhythm.      Pulses: Intact distal pulses.      Heart sounds: Normal heart sounds. No murmur heard.     Pulmonary:      Effort: No respiratory distress.      Breath sounds: No wheezing or rales.   Chest:      Chest wall: No tenderness.   Abdominal:      General: Bowel sounds are normal. There is no distension.      Palpations: Abdomen is soft.      Tenderness: There is no guarding.   Musculoskeletal:         General: Normal range of motion.      Cervical back: Normal range of motion and neck supple.   Skin:     General: Skin is warm.   Neurological:      Mental Status: She is alert and oriented to person, place, and time.         Lab Results   Component Value Date    CHOL 96 (L) 02/01/2023    CHOL 155 05/20/2022    CHOL 166 11/10/2021     Lab Results   Component Value Date    HDL 47 02/01/2023    HDL 45 05/20/2022    HDL 41 11/10/2021     Lab Results   Component Value Date    LDLCALC 38.8 (L) 02/01/2023    LDLCALC 98.0 05/20/2022    LDLCALC 102.0 11/10/2021     Lab Results   Component Value Date    TRIG 51 02/01/2023    TRIG 60 05/20/2022    TRIG 115 11/10/2021     Lab Results   Component Value Date    CHOLHDL 49.0 02/01/2023    CHOLHDL 29.0 05/20/2022    CHOLHDL 24.7 11/10/2021       Chemistry        Component Value Date/Time     03/08/2023 1051    K 4.0 03/08/2023 1051     03/08/2023 1051    CO2 24 03/08/2023 1051    BUN 28 (H) 03/08/2023  1051    CREATININE 1.1 03/08/2023 1051     03/08/2023 1051        Component Value Date/Time    CALCIUM 9.9 03/08/2023 1051    ALKPHOS 93 03/08/2023 1051    AST 30 03/08/2023 1051    ALT 32 03/08/2023 1051    BILITOT 1.0 03/08/2023 1051    ESTGFRAFRICA >60 07/23/2022 2111    EGFRNONAA >60 07/23/2022 2111          Lab Results   Component Value Date    TSH 1.171 02/01/2023     Lab Results   Component Value Date    INR 1.0 02/02/2023    INR 1.1 05/24/2022    INR 1.0 05/24/2022     Lab Results   Component Value Date    WBC 10.48 03/08/2023    HGB 14.4 03/08/2023    HCT 45.3 03/08/2023    MCV 90 03/08/2023     03/08/2023     BNP  @LABRCNTIP(BNP,BNPTRIAGEBLO)@  CrCl cannot be calculated (Patient's most recent lab result is older than the maximum 7 days allowed.).     Imaging:  ======  Results for orders placed during the hospital encounter of 03/02/20   Echo Color Flow Doppler? Yes    Narrative · Mild concentric left ventricular hypertrophy.  · Normal left ventricular systolic function. The estimated ejection   fraction is 60%.  · Normal LV diastolic function.  · Normal right ventricular systolic function.  · Mild aortic regurgitation.  · Normal central venous pressure (3 mmHg).  · Trivial pericardial effusion.        No results found for this or any previous visit.  No results found for this or any previous visit.  Results for orders placed during the hospital encounter of 05/11/17   X-Ray Chest PA And Lateral    Narrative XR CHEST PA AND LATERAL, 05/11/17 11:38:46    Clinical indication: Chest pain.    Findings:  Comparison with 05/10/2017.    Epidural leads within the dorsal thoracic spine.  Lower thoracic wedge compression fracture status post kyphoplasty.  Left upper quadrant surgical clips.    Heart size is normal.  Prominent left pericardial fat pad.    Aortic arch calcification.    Lung fields remain clear.    Impression      Stable chest x-ray.  No acute findings.      Electronically signed  by: EMERY SAMAYOA MD  Date:     05/11/17  Time:    12:07      No results found for this or any previous visit.  No procedure found.    Diagnostic Results:  ECG: Reviewed  Marked sinus bradycardia with sinus arrhythmia   Abnormal ECG   When compared with ECG of 28-OCT-2020 16:09,   Criteria for Septal infarct are no longer Present   T wave inversion no longer evident in Anterior leads   Confirmed by MD RODERICK, BARB ([id number]) on 11/12/2020 9:25:42 PM       Results for orders placed during the hospital encounter of 05/24/22    Cardiac catheterization    Conclusion  · The estimated blood loss was none.  · There is severe mid LAD intramyocardial bridging improved with betablockers intra op  · There was no evidence of an acute atherothrombotic lesion    The procedure log was documented by Documenter: Te Waller and verified by Domingo Martins MD.    Date: 5/24/2022  Time: 9:49 AM    The ASCVD Risk score (Karissa DK, et al., 2019) failed to calculate for the following reasons:    The 2019 ASCVD risk score is only valid for ages 40 to 79    The patient has a prior MI or stroke diagnosis    Assessment and Plan:   Palpitations  -     Cardiac Monitor - 3-15 Day Adult (Cupid Only); Future    Essential hypertension    Coronary-myocardial bridge    TIA (transient ischemic attack)      Reviewed all tests and above medical conditions with patient in detail and formulated treatment plan.  Risk factor modification discussed.   Cardiac low salt diet discussed.  Maintaining healthy weight and weight loss goals were discussed in clinic.  Hypertension controlled, continue with beta-blocker, chest pain-free  cw toprol   Had no obstruction on her coronary catheterization last year.    Chest pain-free.  Occipital functional status.    Patient states that she did not wear properly her monitor last time.  We will repeat the monitor.  I do not recommend a loop recorder this point low suspicion for AFib.      Follow up in 6  months

## 2023-05-04 NOTE — PROGRESS NOTES
Patient Information  Name: Leslie Wood  : 1940  MRN: [id number]     Referring Physician:  Dr. Carson   Primary Care Physician:  Dr. Angeles Carson MD   Date of Visit: 2023      Subjective:       Leslie Wood is a 82 y.o. female who presents for   Chief Complaint   Patient presents with    Skin Check     FBSE     HPI  Patient here for skin check.     Does patient have a personal hx of skin cancers? no  Does patient have family hx of melanoma?  no  Does patient have hx of strong sun exposure or tanning bed use in the past? yes    Patient was last seen:Visit date not found     Prior notes by myself reviewed.   Clinical documentation obtained by nursing staff reviewed.    Review of Systems   Skin:  Negative for itching and rash.      Objective:    Physical Exam   Constitutional: She appears well-developed and well-nourished. No distress.   Neurological: She is alert and oriented to person, place, and time. She is not disoriented.   Psychiatric: She has a normal mood and affect.   Skin:   Areas Examined (abnormalities noted in diagram):   Scalp / Hair Palpated and Inspected  Head / Face Inspection Performed  Neck Inspection Performed  Chest / Axilla Inspection Performed  Abdomen Inspection Performed  Genitals / Buttocks / Groin Inspection Performed  Back Inspection Performed  RUE Inspected  LUE Inspection Performed  RLE Inspected  LLE Inspection Performed  Nails and Digits Inspection Performed                 Diagram Legend     Erythematous scaling macule/papule c/w actinic keratosis       Vascular papule c/w angioma      Pigmented verrucoid papule/plaque c/w seborrheic keratosis      Yellow umbilicated papule c/w sebaceous hyperplasia      Irregularly shaped tan macule c/w lentigo     1-2 mm smooth white papules consistent with Milia      Movable subcutaneous cyst with punctum c/w epidermal inclusion cyst      Subcutaneous movable cyst c/w pilar cyst      Firm pink to brown papule c/w dermatofibroma       Pedunculated fleshy papule(s) c/w skin tag(s)      Evenly pigmented macule c/w junctional nevus     Mildly variegated pigmented, slightly irregular-bordered macule c/w mildly atypical nevus      Flesh colored to evenly pigmented papule c/w intradermal nevus       Pink pearly papule/plaque c/w basal cell carcinoma      Erythematous hyperkeratotic cursted plaque c/w SCC      Surgical scar with no sign of skin cancer recurrence      Open and closed comedones      Inflammatory papules and pustules      Verrucoid papule consistent consistent with wart     Erythematous eczematous patches and plaques     Dystrophic onycholytic nail with subungual debris c/w onychomycosis     Umbilicated papule    Erythematous-base heme-crusted tan verrucoid plaque consistent with inflamed seborrheic keratosis     Erythematous Silvery Scaling Plaque c/w Psoriasis     See annotation      No images are attached to the encounter or orders placed in the encounter.    [] Data reviewed  [] Independent review of test  [] Management discussed with another provider    Assessment / Plan:        Actinic keratosis  Cryosurgery Procedure Note    Verbal consent from the patient is obtained including, but not limited to, risk of hypopigmentation/hyperpigmentation, scar, recurrence of lesion. The patient is aware of the precancerous quality and need for treatment of these lesions. Liquid nitrogen cryosurgery is applied to the 1 actinic keratoses, as detailed in the physical exam, to produce a freeze injury. The patient is aware that blisters may form and is instructed on wound care with gentle cleansing and use of vaseline ointment to keep moist until healed. The patient is supplied a handout on cryosurgery and is instructed to call if lesions do not completely resolve.    Skin cancer screening  Total body skin examination performed today including at least 12 points as noted in physical examination. No lesions suspicious for malignancy  noted.    Recommend daily sun protection/avoidance, use of at least SPF 30, broad spectrum sunscreen (OTC drug), skin self examinations, and routine physician surveillance to optimize early detection    Seborrheic keratoses  These are benign inherited growths without a malignant potential. Reassurance given to patient. No treatment is necessary.     Hemangioma, unspecified site  This is a benign vascular lesion. Reassurance given. No treatment required.     Solar lentigo  This is a benign hyperpigmented sun induced lesion. Recommend daily sun protection/avoidance and use of at least SPF 30, broad spectrum sunscreen (OTC drug) will reduce the number of new lesions. Treatment of these benign lesions are considered cosmetic.             LOS NUMBER AND COMPLEXITY OF PROBLEMS    COMPLEXITY OF DATA RISK TOTAL TIME (m)   02157  76122 [] 1 self-limited or minor problem [x] Minimal to none [] No treatment recommended or patient to monitor 15-29  10-19   67099  [id number] Low  [] 2 or > self limited or minor problems  [] 1 stable chronic illness  [] 1 acute, uncomplicated illness or injury Limited (2)  [] Prior external notes from each unique source  [] Review result of each unique test  [] Order each unique test [x]  Low  OTC medications, minor skin biopsy 30-44  20-29   72422  34149 Moderate  []  1 or > chronic illness with progression, exacerbation or SE of treatment  [x]  2 or more stable chronic illnesses  []  1 acute illness with systemic symptoms  []  1 acute complicated injury  []  1 undiagnosed new problem with uncertain prognosis Moderate (1/3 below)  []  3 or more data items        *Now includes assessment requiring independent historian  []  Independent interpretation of a test  []  Discuss management/test with another provider Moderate  []  Prescription drug mgmt  []  Minor surgery with risk discussed  []  Mgmt limited by social determinates 45-59  30-39   69241  33759 High  []  1 or more chronic illness with severe  exacerbation, progression or SE of treatment  []  1 acute or chronic illness/injury that poses a threat to life or bodily function Extensive (2/3 below)  []  3 or more data items        *Now includes assessment requiring independent historian.  []  Independent interpretation of a test  []  Discuss management/test with another provider High  []  Major surgery with risk discussed  []  Drug therapy requiring intensive monitoring for toxicity  []  Hospitalization  []  Decision for DNR 60-74  40-54      No follow-ups on file.    Marga Martin MD, FAAD  [facility]

## 2023-05-08 ENCOUNTER — CLINICAL SUPPORT (OUTPATIENT)
Dept: REHABILITATION | Facility: HOSPITAL | Age: 83
End: 2023-05-08
Payer: MEDICARE

## 2023-05-08 DIAGNOSIS — R47.01 APHASIA: Primary | ICD-10-CM

## 2023-05-08 DIAGNOSIS — R41.841 COGNITIVE COMMUNICATION DISORDER: ICD-10-CM

## 2023-05-08 PROBLEM — G45.9 TIA (TRANSIENT ISCHEMIC ATTACK): Status: RESOLVED | Noted: 2023-02-01 | Resolved: 2023-05-08

## 2023-05-08 PROCEDURE — 97130 THER IVNTJ EA ADDL 15 MIN: CPT | Mod: KX

## 2023-05-08 PROCEDURE — 97129 THER IVNTJ 1ST 15 MIN: CPT | Mod: KX

## 2023-05-08 NOTE — PROGRESS NOTES
[facility]  Speech Therapy Treatment Note- Neurological Rehabilitation    Date: 5/8/2023     Name: Leslie Wood   MRN: [id number]   Therapy Diagnosis:   Encounter Diagnoses   Name Primary?    Aphasia Yes    Cognitive communication disorder      Physician: Sammy Juarez MD  Physician Orders: [id number] - AMB REFERRAL/CONSULT TO SPEECH THERAPY  Medical Diagnosis: R47.01 (ICD-10-CM) - Aphasia    Visit #/ Visits Authorized: 16/20  Date of Evaluation:  2/17/2023   Insurance Authorization Period: 2/23/2023 - 12/31/2023   Plan of Care Expiration Date: 6/26/2023   Extended Plan of Care:  NA   Progress Note: 5/18/2023  Visits Cancelled: 0  Visits No Show: 0    Time In:  9:00 AM   Time Out: 9:47 AM  TOTAL TIME:  47 minutes      Precautions: Standard  Subjective:   Patient reports: She has not had to take a pain pill for back in a while.   She was compliant to home exercise program.     Response to previous treatment: Continued progress towards visual selective attention.     Pain Scale:  7/10 on a Visual Analog Scale currently.   Pain Location:  mid-back  Objective:   TIMED  Procedure Min.   Cognitive Therapeutic Interventions, first 15 minutes CPT 20338  15   Cognitive Therapeutic Interventions, each additional 15 minutes CPT 95472  32         UNTIMED  Procedure Min.   Speech- Language- Voice Therapy  0     Total Timed Units: 3  Total Untimed Units: 0  Charges Billed/Number of units: 44256/1; 97447/2    Short Term Goals: (6 weeks) Current Progress:   Patient will describe functional objects/images to facilitate a strategy of description in order to increase transfer of intended message with 80% accuracy and moderate clinician cueing.     Progressing/ Not Met 5/8/2023   Not formally addressed.     Patient will complete word finding task (i.e. Creating subject, verb, object pairs in VNEST protocol) with 90% accuracy moderate assistance to improve word fluency.     Met x2 previously     Progressing/ Not Met 5/8/2023    Not formally addressed     Patient will utilize word finding strategies in structured tasks with 70% accuracy and minimal cues.      Progressing/ Not Met 5/8/2023   Not formally addressed.    Patient will summarize read material to improve word fluency, word finding, and reading comprehension in Attentive Reading and Constrained Summarization (ARCS) protocol with 80% accuracy and minimal verbal assistance across 2-3 sessions.     Progressing/ Not Met 5/8/2023   Not formally addressed.      Patient will write 7-10 word sentences with no more than 2 syntactic/grammatical errors given minimal cueing.      Progressing/ Not Met 5/8/2023   Not formally addressed.      Patient will complete functional writing task with complete, coherent, and accurately spelled responses with 80% accuracy and minimal verbal and visual assistance across 3-5 sessions.     Progressing/ Not Met 5/8/2023   Not formally addressed.      Patient will complete RBANS assessment to further assess cognitive communication skills.        GOAL MET 2/27/2023  Completed today. See below for results.       GOAL MET 2/27/2023    Patient will sustain attention to a moderate task (auditory or visual) for 2 minutes with 90% accuracy or no more than 1 redirection.     Visual: GOAL MET 3/20/2023  Auditory: GOAL MET 3/20/2023  VISUAL:   Task 1: Visual sustained attention task completed in which patient visually scanned an article looking for certain letters. She completed this task with 87% accuracy independently (36/41). Clinician declan a purple line on the left hand side to serve an anchor. Patient utilized a blank paper to keep track of which line she was working on.     Task 2: Visual sustained attention task completed in which patient visually scanned a phone bill looking for unexplained calls. She completed this task with 90% accuracy given minimal cueing. The one error that she made was due to skipping over an area code that was more offset to the left than  the others. Clinician and patient dicussed left neglect in depth that is likely due to her first cerebral vascular accident (right cerebral vascular accident).     AUDITORY:  Task 1: Constant Therapy: Understanding Voicemails was completed today with 90% accuracy independently (improvement from last session with 70% accuracy). She required minimal assistance for navigating the application (pressing the next button, replaying the voicemail)    GOAL MET 3/20/2023    Patient will complete selective attention tasks (auditory or visual) with 90% accuracy independently increase selective attention.    Visual: GOAL MET 4/6/2023  Auditory: Met x2 Visual:   Medication management task. Patient was asked to sort three medications into a weekly pill box using the instructions provided on each pill bottle. She completed this task with 92% accuracy (correctly sorted 26 out of 28 individual pills) independently. Clinician and patient discussed the two missed pills and potential negative consequences of missing medication.     Auditory:  Patient completed auditory selective attention task of immediate recall of complex sentences with natural background noise (Constant Therapy: repeat complex sentences Level 1). She was unable to repeat sentences accurately but could answer questions regarding sentences with 90% accuracy.    Patient will use attention shifting strategies to shift attention between two tasks (auditory or visual) with no more than 3 cues or 90% accuracy to improve alternating attention. Visual:   Task 1: Patient completed visual alternating attention task of following directions on the top of a page to connect 11 pictures in a certain order. She completed this task with 100% accuracy independently.     Task 2: Patient completed a second visual alternating attention task of following directions on the top of a page to connect 11 pictures in a certain order. She completed this task with 85% accuracy given minimal  cueing.    Task 23: Patient completed a visual alternating attention task connected numbers and letters in alternating order. She completed this task with 50% accuracy initially. Clinician and patient then wrote down numbers and letters in alternating order to create instructions (1, A, 2, B, 3, etc). Patient then completed this task again with 85% accuracy given moderate cueing. Patient required consistent cueing to use strategies such as marking off items already completed and referring to directions.     Patient will complete short term recall tasks after a 5 minutes delay with 90% accuracy  independently  with use of memory strategies to improve recall of information and generalization of memory strategies. Not formally addressed.    Patient will use Goal Plan Action Review strategy to complete moderate to complex reasoning, planning, or organization tasks with 90% accuracy independently to improve functional executive function skills. Not formally addressed.    Patient will complete a functional task to improve attention, memory and/or executive functions (I.e. sample bill paying activity, recipe, or multiple choice comprehension questions to 1 paragraphs) with 80% accuracy and natural environment noise distractions (TV news background, music, etc.). Not formally addressed.    Patient will be educated regarding cognitive deficits as applicable to the patient's life for increased awareness and will execute returned demonstration of information with 80% accuracy.           GOAL MET 3/6/2023  Education provided regarding results from her assessment, the hierarchy of neuropsychological functions, and the role of attention in other executive functions. Patient and granddaughter verbalized understanding.     GOAL MET 3/6/2023    Patient will independently generate strategies to improve ability to complete tasks with enhanced accuracy and time, based on review of objective previous performance on trials of functional  tasks with 80% accuracy.  Not formally addressed.    Given implementation of strategies, patient will complete a task following initial attempt with 90% accuracy and reported reduced number on the relative scale of cognitive load when compared to previous trial.  Not formally addressed.    Patient will use external memory strategies in order to recall important dates, events, appointments, or tasks to be completed with 90% accuracy independently.  Not formally addressed.        Patient Education/Response:   Education provided on progress thus far. Patient and granddaughter verbalized understanding of all discussed.     Home program established: yes-continue prior HEP.   Exercises were reviewed and Leslie was able to demonstrate them prior to the end of the session.  Leslie demonstrated good  understanding of the education provided.     See Electronic Medical Record under Patient Instructions for exercises provided throughout therapy.  Assessment:   Patient is very motivated to improve and participates in all therapy tasks presented. Steady improvements observed auditory selective attention and visual alternating attention. Decreased accuracy observed with increased complexity of task. She will benefit from continued speech therapy to address the above deficits and to utilize strategies as needed.     Leslie is progressing well towards her goals.Current goals remain appropriate. Goals will be updated as needed.     Patient prognosis is Good. Patient will continue to benefit from skilled outpatient speech and language therapy to address the deficits listed in the problem list on initial evaluation, provide patient/family education and to maximize patient's level of independence in the home and community environment.     Medical necessity is demonstrated by the following IMPAIRMENTS:  Language deficits impact overall quality of life, ability to participation in social and community interactions, and the ability to  communicate important medical information if needed.   Cognitive-communication deficits impact overall quality of life and the ability to safely and independently complete activities of daily living.     Barriers to Therapy: none   Patient's spiritual, cultural and educational needs considered and patient agreeable to plan of care and goals.  Plan:   Continue Plan of Care with focus on sustained/selective attention for functional daily tasks.     Becca Blanco, CCC-SLP, CBIS   Speech Language Pathologist   Certified Brain Injury Specialist   5/8/2023

## 2023-05-12 ENCOUNTER — CLINICAL SUPPORT (OUTPATIENT)
Dept: SPEECH THERAPY | Facility: HOSPITAL | Age: 83
End: 2023-05-12
Payer: MEDICARE

## 2023-05-12 DIAGNOSIS — R41.841 COGNITIVE COMMUNICATION DISORDER: ICD-10-CM

## 2023-05-12 DIAGNOSIS — R47.01 APHASIA: Primary | ICD-10-CM

## 2023-05-12 PROCEDURE — 97130 THER IVNTJ EA ADDL 15 MIN: CPT | Mod: KX

## 2023-05-12 PROCEDURE — 97129 THER IVNTJ 1ST 15 MIN: CPT | Mod: KX

## 2023-05-12 NOTE — PROGRESS NOTES
[facility]  Speech Therapy Treatment Note- Neurological Rehabilitation    Date: 5/12/2023     Name: Leslie Wood   MRN: [id number]   Therapy Diagnosis:   Encounter Diagnoses   Name Primary?    Aphasia Yes    Cognitive communication disorder        Physician: Sammy Juarez MD  Physician Orders: [id number] - AMB REFERRAL/CONSULT TO SPEECH THERAPY  Medical Diagnosis: R47.01 (ICD-10-CM) - Aphasia    Visit #/ Visits Authorized: 17/20  Date of Evaluation:  2/17/2023   Insurance Authorization Period: 2/23/2023 - 12/31/2023   Plan of Care Expiration Date: 6/26/2023   Extended Plan of Care:  NA   Progress Note: 5/18/2023  Visits Cancelled: 0  Visits No Show: 0    Time In:  8:53 AM   Time Out: 9:02 AM  TOTAL TIME:  39 minutes      Precautions: Standard  Subjective:   Patient reported to her session in a wheelchair today. She reported weakness and knee pain.   She was compliant to home exercise program.     Response to previous treatment: Continued progress towards visual selective attention.     Pain Scale:  6/10 on a Visual Analog Scale currently.   Pain Location:  right knee  Objective:   TIMED  Procedure Min.   Cognitive Therapeutic Interventions, first 15 minutes CPT 25450  15   Cognitive Therapeutic Interventions, each additional 15 minutes CPT 17942  24         UNTIMED  Procedure Min.   Speech- Language- Voice Therapy  0     Total Timed Units: 3  Total Untimed Units: 0  Charges Billed/Number of units: 45392/1; 13309/2    Short Term Goals: (6 weeks) Current Progress:   Patient will describe functional objects/images to facilitate a strategy of description in order to increase transfer of intended message with 80% accuracy and moderate clinician cueing.     Progressing/ Not Met 5/12/2023   Not formally addressed.     Patient will complete word finding task (i.e. Creating subject, verb, object pairs in VNEST protocol) with 90% accuracy moderate assistance to improve word fluency.     Met x2 previously      Progressing/ Not Met 5/12/2023   Not formally addressed     Patient will utilize word finding strategies in structured tasks with 70% accuracy and minimal cues.      Progressing/ Not Met 5/12/2023   Not formally addressed.    Patient will summarize read material to improve word fluency, word finding, and reading comprehension in Attentive Reading and Constrained Summarization (ARCS) protocol with 80% accuracy and minimal verbal assistance across 2-3 sessions.     Progressing/ Not Met 5/12/2023   Not formally addressed.      Patient will write 7-10 word sentences with no more than 2 syntactic/grammatical errors given minimal cueing.      Progressing/ Not Met 5/12/2023   Not formally addressed.      Patient will complete functional writing task with complete, coherent, and accurately spelled responses with 80% accuracy and minimal verbal and visual assistance across 3-5 sessions.     Progressing/ Not Met 5/12/2023   Not formally addressed.      Patient will complete RBANS assessment to further assess cognitive communication skills.        GOAL MET 2/27/2023  Completed today. See below for results.       GOAL MET 2/27/2023    Patient will sustain attention to a moderate task (auditory or visual) for 2 minutes with 90% accuracy or no more than 1 redirection.     Visual: GOAL MET 3/20/2023  Auditory: GOAL MET 3/20/2023  VISUAL:   Task 1: Visual sustained attention task completed in which patient visually scanned an article looking for certain letters. She completed this task with 87% accuracy independently (36/41). Clinician declan a purple line on the left hand side to serve an anchor. Patient utilized a blank paper to keep track of which line she was working on.     Task 2: Visual sustained attention task completed in which patient visually scanned a phone bill looking for unexplained calls. She completed this task with 90% accuracy given minimal cueing. The one error that she made was due to skipping over an area  code that was more offset to the left than the others. Clinician and patient dicussed left neglect in depth that is likely due to her first cerebral vascular accident (right cerebral vascular accident).     AUDITORY:  Task 1: Constant Therapy: Understanding Voicemails was completed today with 90% accuracy independently (improvement from last session with 70% accuracy). She required minimal assistance for navigating the application (pressing the next button, replaying the voicemail)    GOAL MET 3/20/2023    Patient will complete selective attention tasks (auditory or visual) with 90% accuracy independently increase selective attention.    Visual: GOAL MET 4/6/2023  Auditory: GOAL MET 5/12/2023  Visual:   Medication management task. Patient was asked to sort three medications into a weekly pill box using the instructions provided on each pill bottle. She completed this task with 92% accuracy (correctly sorted 26 out of 28 individual pills) independently. Clinician and patient discussed the two missed pills and potential negative consequences of missing medication.     Auditory:  Patient completed auditory selective attention task of immediate recall of short stories (3 sentences). Initially she recalled stories with 50% accuracy. Clinician and patient discussed strategies for improved accuracy such as repeating the story and using visualization. Using these strategies, she then answered questions with 90% accuracy.    Patient will use attention shifting strategies to shift attention between two tasks (auditory or visual) with no more than 3 cues or 90% accuracy to improve alternating attention. Visual:   Not formally addressed although given as part of her HEP.    Patient will complete short term recall tasks after a 5 minutes delay with 90% accuracy  independently  with use of memory strategies to improve recall of information and generalization of memory strategies. Not formally addressed.    Patient will use Goal  Plan Action Review strategy to complete moderate to complex reasoning, planning, or organization tasks with 90% accuracy independently to improve functional executive function skills. Not formally addressed.    Patient will complete a functional task to improve attention, memory and/or executive functions (I.e. sample bill paying activity, recipe, or multiple choice comprehension questions to 1 paragraphs) with 80% accuracy and natural environment noise distractions (TV news background, music, etc.). Not formally addressed.    Patient will be educated regarding cognitive deficits as applicable to the patient's life for increased awareness and will execute returned demonstration of information with 80% accuracy.           GOAL MET 3/6/2023  Education provided regarding results from her assessment, the hierarchy of neuropsychological functions, and the role of attention in other executive functions. Patient and granddaughter verbalized understanding.     GOAL MET 3/6/2023    Patient will independently generate strategies to improve ability to complete tasks with enhanced accuracy and time, based on review of objective previous performance on trials of functional tasks with 80% accuracy.  Not formally addressed.    Given implementation of strategies, patient will complete a task following initial attempt with 90% accuracy and reported reduced number on the relative scale of cognitive load when compared to previous trial.  Not formally addressed.    Patient will use external memory strategies in order to recall important dates, events, appointments, or tasks to be completed with 90% accuracy independently.  Not formally addressed.        Patient Education/Response:   Education provided on progress thus far. Patient and granddaughter verbalized understanding of all discussed.     Home program established: yes-patient was given several visual alternating attention tasks to complete.   Exercises were reviewed and Leslie was  able to demonstrate them prior to the end of the session.  Leslie demonstrated good  understanding of the education provided.     See Electronic Medical Record under Patient Instructions for exercises provided throughout therapy.  Assessment:   Patient is very motivated to improve and participates in all therapy tasks presented. Selective attention goal met today.. Decreased accuracy observed with increased complexity of task. She will benefit from continued speech therapy to address the above deficits and to utilize strategies as needed.     Leslie is progressing well towards her goals.Current goals remain appropriate. Goals will be updated as needed.     Patient prognosis is Good. Patient will continue to benefit from skilled outpatient speech and language therapy to address the deficits listed in the problem list on initial evaluation, provide patient/family education and to maximize patient's level of independence in the home and community environment.     Medical necessity is demonstrated by the following IMPAIRMENTS:  Language deficits impact overall quality of life, ability to participation in social and community interactions, and the ability to communicate important medical information if needed.   Cognitive-communication deficits impact overall quality of life and the ability to safely and independently complete activities of daily living.     Barriers to Therapy: none   Patient's spiritual, cultural and educational needs considered and patient agreeable to plan of care and goals.  Plan:   Continue Plan of Care with focus on sustained/selective attention for functional daily tasks.     Becca Blanco, CCC-SLP, CBIS   Speech Language Pathologist   Certified Brain Injury Specialist   5/12/2023

## 2023-05-15 ENCOUNTER — CLINICAL SUPPORT (OUTPATIENT)
Dept: REHABILITATION | Facility: HOSPITAL | Age: 83
End: 2023-05-15
Payer: MEDICARE

## 2023-05-15 DIAGNOSIS — R41.841 COGNITIVE COMMUNICATION DISORDER: ICD-10-CM

## 2023-05-15 DIAGNOSIS — Z74.09 IMPAIRED FUNCTIONAL MOBILITY, BALANCE, GAIT, AND ENDURANCE: Primary | ICD-10-CM

## 2023-05-15 DIAGNOSIS — R47.01 APHASIA: Primary | ICD-10-CM

## 2023-05-15 PROCEDURE — 97130 THER IVNTJ EA ADDL 15 MIN: CPT | Mod: KX

## 2023-05-15 PROCEDURE — 97140 MANUAL THERAPY 1/> REGIONS: CPT

## 2023-05-15 PROCEDURE — 97129 THER IVNTJ 1ST 15 MIN: CPT | Mod: KX

## 2023-05-15 PROCEDURE — 97110 THERAPEUTIC EXERCISES: CPT

## 2023-05-15 NOTE — PROGRESS NOTES
[facility]  Speech Therapy Treatment Note- Neurological Rehabilitation    Date: 5/15/2023     Name: Leslie Wood   MRN: [id number]   Therapy Diagnosis:   Encounter Diagnoses   Name Primary?    Aphasia Yes    Cognitive communication disorder        Physician: Sammy Juarez MD  Physician Orders: [id number] - AMB REFERRAL/CONSULT TO SPEECH THERAPY  Medical Diagnosis: R47.01 (ICD-10-CM) - Aphasia    Visit #/ Visits Authorized: 17/20  Date of Evaluation:  2/17/2023   Insurance Authorization Period: 2/23/2023 - 12/31/2023   Plan of Care Expiration Date: 6/26/2023   Extended Plan of Care:  NA   Progress Note: 5/18/2023  Visits Cancelled: 0  Visits No Show: 0    Time In: 11:45 AM   Time Out: 12:38 PM  TOTAL TIME:  38 minutes      Precautions: Standard  Subjective:   Patient reported to her session alone. She had just completed her physical therapy session.   She was compliant to home exercise program.     Response to previous treatment: Continued progress towards visual selective attention.     Pain Scale:  6/10 on a Visual Analog Scale currently.   Pain Location:  right knee  Objective:   TIMED  Procedure Min.   Cognitive Therapeutic Interventions, first 15 minutes CPT 18134  15   Cognitive Therapeutic Interventions, each additional 15 minutes CPT 73454  23         UNTIMED  Procedure Min.   Speech- Language- Voice Therapy  0     Total Timed Units: 3  Total Untimed Units: 0  Charges Billed/Number of units: 50887/1; 04673/2    Short Term Goals: (6 weeks) Current Progress:   Patient will describe functional objects/images to facilitate a strategy of description in order to increase transfer of intended message with 80% accuracy and moderate clinician cueing.     Progressing/ Not Met 5/15/2023   Not formally addressed.     Patient will complete word finding task (i.e. Creating subject, verb, object pairs in VNEST protocol) with 90% accuracy moderate assistance to improve word fluency.     Met x2 previously      Progressing/ Not Met 5/15/2023   Not formally addressed     Patient will utilize word finding strategies in structured tasks with 70% accuracy and minimal cues.      Progressing/ Not Met 5/15/2023   Not formally addressed.    Patient will summarize read material to improve word fluency, word finding, and reading comprehension in Attentive Reading and Constrained Summarization (ARCS) protocol with 80% accuracy and minimal verbal assistance across 2-3 sessions.     Progressing/ Not Met 5/15/2023   Not formally addressed.      Patient will write 7-10 word sentences with no more than 2 syntactic/grammatical errors given minimal cueing.      Progressing/ Not Met 5/15/2023   Not formally addressed.      Patient will complete functional writing task with complete, coherent, and accurately spelled responses with 80% accuracy and minimal verbal and visual assistance across 3-5 sessions.     Progressing/ Not Met 5/15/2023   Not formally addressed.      Patient will complete RBANS assessment to further assess cognitive communication skills.        GOAL MET 2/27/2023  Completed today. See below for results.       GOAL MET 2/27/2023    Patient will sustain attention to a moderate task (auditory or visual) for 2 minutes with 90% accuracy or no more than 1 redirection.     Visual: GOAL MET 3/20/2023  Auditory: GOAL MET 3/20/2023  VISUAL:   Task 1: Visual sustained attention task completed in which patient visually scanned an article looking for certain letters. She completed this task with 87% accuracy independently (36/41). Clinician declan a purple line on the left hand side to serve an anchor. Patient utilized a blank paper to keep track of which line she was working on.     Task 2: Visual sustained attention task completed in which patient visually scanned a phone bill looking for unexplained calls. She completed this task with 90% accuracy given minimal cueing. The one error that she made was due to skipping over an area  code that was more offset to the left than the others. Clinician and patient dicussed left neglect in depth that is likely due to her first cerebral vascular accident (right cerebral vascular accident).     AUDITORY:  Task 1: Constant Therapy: Understanding Voicemails was completed today with 90% accuracy independently (improvement from last session with 70% accuracy). She required minimal assistance for navigating the application (pressing the next button, replaying the voicemail)    GOAL MET 3/20/2023    Patient will complete selective attention tasks (auditory or visual) with 90% accuracy independently increase selective attention.    Visual: GOAL MET 4/6/2023  Auditory: GOAL MET 5/12/2023  Visual:   Medication management task. Patient was asked to sort three medications into a weekly pill box using the instructions provided on each pill bottle. She completed this task with 92% accuracy (correctly sorted 26 out of 28 individual pills) independently. Clinician and patient discussed the two missed pills and potential negative consequences of missing medication.     Auditory:  Patient completed auditory selective attention task of immediate recall of short stories (3 sentences). Initially she recalled stories with 50% accuracy. Clinician and patient discussed strategies for improved accuracy such as repeating the story and using visualization. Using these strategies, she then answered questions with 90% accuracy.    Patient will use attention shifting strategies to shift attention between two tasks (auditory or visual) with no more than 3 cues or 90% accuracy to improve alternating attention. Visual:   Patient completed visual alternating attention task of following directions across the top of the page to connect pictures in a certain order. She completed the first trial with 90% accuracy given moderate verbal cueing. She completed the second trial with 80% accuracy independently. Clinician and patient then  completed this task together and patient completed with 100% accuracy given minimal cueing.     Patient completed visual alternating attention task of scanning a page for letters of the alphabet in order. She completed this task with 100% accuracy given moderate cueing for accurate visual scanning/looking to the left.     Auditory:   Patient completed auditory alternating attention task of immediate recall of numbers (task 1) and immediate recall of short stories (task 2). She switched between tasks every few minutes. She completed task 1 with 99% accuracy and task 2 with 70% accuracy.    Patient will complete short term recall tasks after a 5 minutes delay with 90% accuracy  independently  with use of memory strategies to improve recall of information and generalization of memory strategies. Not formally addressed.    Patient will use Goal Plan Action Review strategy to complete moderate to complex reasoning, planning, or organization tasks with 90% accuracy independently to improve functional executive function skills. Not formally addressed.    Patient will complete a functional task to improve attention, memory and/or executive functions (I.e. sample bill paying activity, recipe, or multiple choice comprehension questions to 1 paragraphs) with 80% accuracy and natural environment noise distractions (TV news background, music, etc.). Patient completed functional task of listening to voice mails and answering questions. She requires consistent cueing to locate the question at the top left of the iPad. She often will attempt to answer the questions without reading the questions - which is sometimes successful with. She completed this task with 70% accuracy initially. Patient then completed this task a second time and read each question aloud. Using this strategy, along with replaying the voice mail when needed, she then completed this task with 100% accuracy. She required consistent cueing to find and read the  question.    Patient will be educated regarding cognitive deficits as applicable to the patient's life for increased awareness and will execute returned demonstration of information with 80% accuracy.           GOAL MET 3/6/2023  Education provided regarding results from her assessment, the hierarchy of neuropsychological functions, and the role of attention in other executive functions. Patient and granddaughter verbalized understanding.     GOAL MET 3/6/2023    Patient will independently generate strategies to improve ability to complete tasks with enhanced accuracy and time, based on review of objective previous performance on trials of functional tasks with 80% accuracy.  Not formally addressed.    Given implementation of strategies, patient will complete a task following initial attempt with 90% accuracy and reported reduced number on the relative scale of cognitive load when compared to previous trial.  Not formally addressed.    Patient will use external memory strategies in order to recall important dates, events, appointments, or tasks to be completed with 90% accuracy independently.  Not formally addressed.        Patient Education/Response:   Education provided on progress thus far. Patient verbalized understanding of all discussed.     Home program established: yes-patient was given several visual alternating attention tasks to complete.   Exercises were reviewed and Leslie was able to demonstrate them prior to the end of the session.  Leslie demonstrated good  understanding of the education provided.     See Electronic Medical Record under Patient Instructions for exercises provided throughout therapy.  Assessment:   Patient is very motivated to improve and participates in all therapy tasks presented. Selective attention goal met today.. Decreased accuracy observed with increased complexity of task. She will benefit from continued speech therapy to address the above deficits and to utilize strategies as  needed.     Leslie is progressing well towards her goals.Current goals remain appropriate. Goals will be updated as needed.     Patient prognosis is Good. Patient will continue to benefit from skilled outpatient speech and language therapy to address the deficits listed in the problem list on initial evaluation, provide patient/family education and to maximize patient's level of independence in the home and community environment.     Medical necessity is demonstrated by the following IMPAIRMENTS:  Language deficits impact overall quality of life, ability to participation in social and community interactions, and the ability to communicate important medical information if needed.   Cognitive-communication deficits impact overall quality of life and the ability to safely and independently complete activities of daily living.     Barriers to Therapy: none   Patient's spiritual, cultural and educational needs considered and patient agreeable to plan of care and goals.  Plan:   Continue Plan of Care with focus on sustained/selective attention for functional daily tasks.     Becca Blanco, CCC-SLP, CBIS   Speech Language Pathologist   Certified Brain Injury Specialist   5/15/2023

## 2023-05-17 ENCOUNTER — OFFICE VISIT (OUTPATIENT)
Dept: OPHTHALMOLOGY | Facility: CLINIC | Age: 83
End: 2023-05-17
Payer: MEDICARE

## 2023-05-17 ENCOUNTER — HOSPITAL ENCOUNTER (OUTPATIENT)
Dept: CARDIOLOGY | Facility: HOSPITAL | Age: 83
Discharge: HOME | End: 2023-05-17
Attending: INTERNAL MEDICINE
Payer: MEDICARE

## 2023-05-17 ENCOUNTER — CLINICAL SUPPORT (OUTPATIENT)
Dept: REHABILITATION | Facility: HOSPITAL | Age: 83
End: 2023-05-17
Attending: FAMILY MEDICINE
Payer: MEDICARE

## 2023-05-17 DIAGNOSIS — M25.60 RANGE OF MOTION DEFICIT: ICD-10-CM

## 2023-05-17 DIAGNOSIS — Z78.9 DECREASED ACTIVITIES OF DAILY LIVING (ADL): ICD-10-CM

## 2023-05-17 DIAGNOSIS — M62.81 DECREASED MUSCLE STRENGTH: ICD-10-CM

## 2023-05-17 DIAGNOSIS — R29.898 POOR FINE MOTOR SKILLS: ICD-10-CM

## 2023-05-17 DIAGNOSIS — R29.898 DECREASED GRIP STRENGTH OF LEFT HAND: ICD-10-CM

## 2023-05-17 DIAGNOSIS — R00.2 PALPITATIONS: ICD-10-CM

## 2023-05-17 DIAGNOSIS — H53.47 HEMIANOPSIA: ICD-10-CM

## 2023-05-17 DIAGNOSIS — H53.462 LEFT HOMONYMOUS HEMIANOPSIA: Primary | ICD-10-CM

## 2023-05-17 DIAGNOSIS — R29.818 FINE MOTOR IMPAIRMENT: ICD-10-CM

## 2023-05-17 DIAGNOSIS — R29.898 FINE MOTOR IMPAIRMENT: ICD-10-CM

## 2023-05-17 PROCEDURE — 92083 EXTENDED VISUAL FIELD XM: CPT | Mod: PBBFAC | Performed by: OPTOMETRIST

## 2023-05-17 PROCEDURE — 99212 OFFICE O/P EST SF 10 MIN: CPT | Mod: PBBFAC | Performed by: OPTOMETRIST

## 2023-05-17 PROCEDURE — 97112 NEUROMUSCULAR REEDUCATION: CPT

## 2023-05-17 PROCEDURE — 97166 OT EVAL MOD COMPLEX 45 MIN: CPT

## 2023-05-17 PROCEDURE — 99499 NO LOS: ICD-10-PCS | Mod: S$PBB,,, | Performed by: OPTOMETRIST

## 2023-05-17 PROCEDURE — 93248 EXT ECG>7D<15D REV&INTERPJ: CPT | Mod: ,,, | Performed by: INTERNAL MEDICINE

## 2023-05-17 PROCEDURE — 93248 CV CARDIAC MONITOR - 3-15 DAY ADULT (CUPID ONLY): ICD-10-PCS | Mod: ,,, | Performed by: INTERNAL MEDICINE

## 2023-05-17 PROCEDURE — 99999 PR PBB SHADOW E&M-EST. PATIENT-LVL II: ICD-10-PCS | Mod: PBBFAC,,, | Performed by: OPTOMETRIST

## 2023-05-17 PROCEDURE — 99999 PR PBB SHADOW E&M-EST. PATIENT-LVL II: CPT | Mod: PBBFAC,,, | Performed by: OPTOMETRIST

## 2023-05-17 PROCEDURE — 92083 HUMPHREY VISUAL FIELD - OU - BOTH EYES: ICD-10-PCS | Mod: 26,S$PBB,, | Performed by: OPTOMETRIST

## 2023-05-17 PROCEDURE — 99499 UNLISTED E&M SERVICE: CPT | Mod: S$PBB,,, | Performed by: OPTOMETRIST

## 2023-05-17 PROCEDURE — 97110 THERAPEUTIC EXERCISES: CPT

## 2023-05-17 NOTE — PROGRESS NOTES
HPI     Glaucoma            Comments: Pt reports for HVF and IOP. Denies any pain or irritation. Va   stable. No drops.           Comments    DM  Stroke OS affect OS periph   TIA December 2022  PC IOL more than 8 years            Last edited by Jayden Rodriguez on 5/17/2023 11:04 AM.            Assessment /Plan     For exam results, see Encounter Report.    Left homonymous hemianopsia    Hemianopsia  -     Herrera Visual Field - OU - Extended - Both Eyes        Reviewed VF with pt      RTC 1 yr for dilated eye exam or PRN if any changes or new vision concerns.   Discussed above and answered questions.

## 2023-05-17 NOTE — TELEPHONE ENCOUNTER
The patient feels well enough to move her appointment in two months. The patient has been notified of this information and all questions answered.   
As Per Primary Care Provider  C/P WNL

## 2023-05-17 NOTE — PLAN OF CARE
[facility]   Occupational Therapy Initial Neurological Evaluation     Date: 5/17/2023  Name: Leslie CASTELLON Israel  [facility] Number: [id number]    Therapy Diagnosis:   Encounter Diagnoses   Name Primary?    Poor fine motor skills     Range of motion deficit     Decreased  strength of left hand     Decreased muscle strength     Decreased activities of daily living (ADL)     Fine motor impairment      Physician: Angeles Carson MD    Physician Orders: Occupational Therapy to Evaluate and Treat   Medical Diagnosis: R29.898 (ICD-10-CM) - Poor fine motor skills  Surgical Procedure and Date: N/A    Evaluation Date: 5/17/2023  Insurance Authorization Period Expiration: 4/20/2023 - 12/31/2023  Plan of Care Certification Period: 5/17/2023 - 10/17/2023  Progress Note Due: 6/17/2023     Date of Return to MD: N/A    Visit # / Visits authorized: 1/1  FOTO: 1/3    Precautions:  Standard    Time In: 11:30  Time Out: 12:25  Total Appointment Time (timed & untimed codes): 50 minutes      Subjective     Date of Onset: First was in 2020 with Hemiparesis, TIA in December of 2022  Mechanism of Injury/ History of Current Condition: Patient reports that she had a CVA in 2020 that impacted her left upper extremity strength. She also had a decrease in right upper extremity at that time as well. Her right upper extremity strength returned but her left has not. She is having difficulty with fine motor tasks and is left hand dominate. She also has a torn rotator cuff from a previous fall and is interested on strengthening/improving her range of motion if possible.    Involved Side: Left side more than right  Dominant Side: Left    Surgical Procedure: NA  Imaging: NA    Previous Therapy: Receiving Physical Therapy and Speech Therapy     Patient's Goals for Therapy: Patient reported goals are to increase fine motor skills and right range of motion in order to return to prior functional level.    Falls: a few weeks ago due to her  dog    Pain:  Pain Related Behaviors Observed: Yes   Functional Pain Scale Rating 0-10:   Average: 7/10   Best: 5/10   Worst: 9/10   Location: Back  Description: Aching and sharp  Aggravating Factors: Bending  Easing Factors: rest    Occupation: Compression garments   Working Presently: Retired      Functional Limitations/Social History:    Prior Level of Function: Independent and pain free with all ADL, IADL, community mobility and functional activities.   Current Level of Function: Needs assistance with some ADL, IADL, community mobility and functional activities with reports of increased pain and need for increased time and frequent breaks.      Limitation of Functional Status as follows:   ADLs/IADLs: See Below in Objective Section    Home/Living Environment : lives with their daughter  Home Access: Single Story  Leisure: Tries to clean and does laundry   DME: rolling walker, standard walker, single point cane, wheelchair, and stool grab bars, toilet riser       Past Medical History/Physical Systems Review:     Past Medical History:  Leslie Wood  has a past medical history of Arthritis, Cataract, GERD (gastroesophageal reflux disease), Heart attack, Heart attack, Hyperlipidemia, Hypertension, and Stroke.    Past Surgical History:  Leslie Wood  has a past surgical history that includes Back surgery; Eye surgery; Appendectomy; Adrenal gland surgery; Hemorrhoid surgery; Hernia repair; Hysterectomy; Tonsillectomy; Adenoidectomy; indirect lumbar decompression; Left heart catheterization (Left, 5/20/2022); Coronary angiography (N/A, 5/20/2022); and Coronary angiography (N/A, 5/24/2022).    Current Medications:  Leslie has a current medication list which includes the following prescription(s): aspirin, atorvastatin, clopidogrel, duloxetine, furosemide, losartan, metoprolol succinate, nifedipine, nucynta, tizanidine, [DISCONTINUED] carvedilol, [DISCONTINUED] clonidine, [DISCONTINUED] diclofenac sodium,  [DISCONTINUED] isosorbide mononitrate, [DISCONTINUED] metoprolol tartrate, [DISCONTINUED] nitroglycerin, and [DISCONTINUED] valsartan.    Allergies:  Review of patient's allergies indicates:   Allergen Reactions    Acetaminophen     Amitriptyline     Hydrochlorothiazide      Causes muscle cramping    Lisinopril      hyperkalemia    Oxycodone     Percocet [oxycodone-acetaminophen] Other (See Comments)     Seizures    Belbuca [buprenorphine hcl] Nausea And Vomiting and Other (See Comments)     Black out     Codeine Nausea Only and Rash    Prazosin Other (See Comments)     dizziness        Objective     Activities of Daily Living:  Feeding: Modified Independent  Grooming: Stand-by Assist  Hygiene: Stand-by Assist  Upper Body Dressing: Stand-by Assist  Lower Body Dressing: Stand-by Assist  Toileting: Supervision  Bathing: Stand-by Assist    Instrumental Activities of Daily Living:  Homecare: Minimal Assist  Cooking: Minimal Assist  Laundry: Minimal Assist  Yard Work: Maximal Assist  Use of Telephone: Supervision  Money Management: Minimal Assist  Medication Management: Minimal Assist  Handwriting: Minimal Assist  Technology Use: Minimal Assist      Cognitive Exam:  Oriented: Person, Place, Time, and Situation  Behaviors: normal, cooperative  Follows Commands/Attention: Easily distracted  Communication: clear/fluent  Memory: Impaired STM as determined by 3 word recall after 1 minute and 3 minutes  Safety Awareness/Insight to Disability: choosing to repress/ignore implications of diagnosis, treatment, and prognosis  Coping Skills/Emotional Control: Appropriate to situation    Physical Exam:    Postural Examination/Scapula Alignment: Rounded shoulder and Head forward      Joint Evaluation  AROM  5/17/2023 AROM  5/17/2023 PROM   5/17/2023 Pain/Dysfunction with movement Goal    Left Right Right  Active    Shoulder Flexion 0-180 Within normal limits  90 110  110   Shoulder Abduction 0-180  90 110  110   Shoulder External  Rotation 0-90  25 40     Shoulder Internal Rotation 0-90  45 60     Shoulder Extension 0-60  10 25     Shoulder Horizontal Adduction 0-90  Within normal limits  Within normal limits      Elbow Flexion 0-150        Elbow Extension 0        Wrist Flexion 0-80        Wrist Extension 0-70        Wrist Supination 0-80        Wrist Pronation 0-80        Wrist Ulnar Deviation 0-30        Wrist Radial Deviation 0-20          Fist: normal      Strength 5/17/2023 5/17/2023    **within available ROM** Left Right Goal Left   Shoulder Flexion 3+/5 3-/5 4/5   Shoulder Abduction 3+/5 3-/5 4/5   Shoulder External Rotation  3+/5 3-/5 4/5   Shoulder Internal Rotation 3+/5 3-/5 4/5   Shoulder Extension 3+/5 3-/5 4/5   Shoulder Horizontal Adduction 3+/5 3-/5 4/5   Elbow Flexion 3+/5 3+/5 4/5   Elbow Extension 3+/5 3+/5 4/5   Wrist Flexion 3+/5 3+/5 4/5   Wrist Extension 3+/5 3+/5 4/5   Supination 3+/5 3+/5 4/5   Pronation 3+/5 3+/5 4/5   Ulnar Deviation 3+/5 3+/5 4/5   Radial Deviation 3+/5 3+/5 4/5      Strength: (MUNDO Dynamometer in lbs.) Average 3 trials, Position II:     5/17/2023 5/17/2023 Goal    Left Right Left   Rung II 25# 35# 31#     Pinch Strength (Measured in psi)     5/17/2023 5/17/2023    Left Right   2pt Pinch 6 psi 7 psi   3pt Pinch 6 psi 7 psi   Lateral Pinch 9 psi 12 psi     Coordination:   -     Fine Motor Coordination: impaired  -     Upper Extremity Coordination: intact  -     Lower Extremity Coordination: intact    Sensation:  Leslie  reports normal sensation    -    Light touch: bilateral intact    -    Sharp/Dull: bilateral intact     Balance:     -   Static Sit - GOOD-: Takes MODERATE challenges from all directions but inconsistently    -   Dynamic Sit- GOOD-: Takes MODERATE challenges from all directions but inconsistently    -   Static Stand - POOR+: Needs MINIMAL assist to maintain    -   Dynamic Stand - POOR+: Needs MINIMAL assist to maintain    Endurance Deficit: moderate                   CMS  Impairment/Limitation/Restriction for FOTO NOC Hand Survey    Therapist reviewed FOTO scores for Leslie Wood on 5/17/2023.   FOTO documents entered into [employer] - see Media section.    Limitation Score: 37%         Treatment     Total Treatment time separate from Evaluation time: 25    Therapeutic Exercises: to develop strength, endurance, ROM, flexibility, posture and core stabilization. Leslie received (15) minutes of exercises listed below. x = performed today * = level of progression of activity     Therapeutic Exercise 5/17/2023    Interossei  - adduction pink foam  -abduction rubber bands x 20x bilateral   Lumbricals - pink foam x 20x bilateral   Radial adduction - rubber bands x 20x bilateral   Palmar adduction - rubber bands x 20x bilateral   Thumb opposition - rubber bands x 20x bilateral   Wrist Exercise  - wrist flexion  - wrist extension  - supination/pronation x 20x bilateral   Lateral pinch  - pink foam x 20x bilateral   Whole hand resistive   - pink foam x 20x bilateral       Neuromuscular Re-education: the use of functional strengthening, stretching, balancing and coordination activities that train the nerves and muscles to react and communicate to restore normal body movement patterns to increase self care independence. Leslie received (10) minutes of interventions listed below.  x = interventions performed today * = level of progression of activity     Neuromuscular Re-education 5/17/2023    Tendon Glides x 20x bilateral   Scapular elevation x 20x   Scapular retraction x 20x           Home Exercise Program/Education:    Education provided:   Patient educated on the impairments noted above and the effects of Occupational therapy intervention to improve overall condition and Quality of Life.   Patient was educated on all the above exercise prior/during/after for proper posture, positioning, and execution for safe performance with home exercise program.  Patient/Family Education: role of Occupational  Therapy, goals for Occupational Therapy, scheduling/cancellations, and insurance limitations - patient verbalized understanding  Home Exercise Program - See Below    Written Home Exercises Provided: yes.  Exercises were reviewed and Leslie was able to demonstrate them prior to the end of the session. Patient was advised to perform these exercises free of pain, and to stop performing them if pain occurs.  Leslie demonstrated good  understanding of the education provided.     See EMR under Patient Instructions for exercises provided 5/17/2023.    Assessment     Leslie Wood is a 82 y.o. female referred to outpatient occupational therapy and presents with a medical diagnosis of poor fine motor skills.      Assessment of Occupational Performance   Performance Deficits    Physical:  Joint Mobility  Muscle Power/Strength  Muscle Endurance  Control of Voluntary Movement   Strength  Pinch Strength  Fine Motor Coordination  Postural Control  Pain    Cognitive:  Attention  Sequencing  Memory  Safety Awareness/Insight to Disability    Psychosocial:    No Deficits        Patient presents with the following therapy deficits: Decreased ROM, Decreased  strength, Decreased pinch strength, Decreased muscle strength, Decreased functional hand use, Increased pain, and Diminished/Impaired Coordination and demonstrates limitations as described in the chart below. Following medical record review, it is determined that the patient will benefit from skilled Occupational Therapy services in order to address the deficits stated above and in the chart below, provide patient/family education, to maximize patient's level of independence and maximize pain free and/or functional use of bilateral upper extremities. Patient is in agreement with plan of care and goals as to be addressed in the treatment plan. The patient's rehabilitation potential is Good.       Plan of care discussed with patient: Yes  Patient's spiritual, cultural and  educational needs considered and patient is agreeable to the plan of care and goals as stated below:     Anticipated barriers to occupational therapy: Pain, Cognition, home exercise program compliance    Medical Necessity is demonstrated by the following  History  Co-morbidities and personal factors that may impact the plan of care [] LOW: no personal factors / co-morbidities  [] MODERATE: 1-2 personal factors / co-morbidities  [x] HIGH: 3+ personal factors / co-morbidities    Moderate / High Support Documentation:   Past Medical History:   Diagnosis Date    Arthritis     Cataract     GERD (gastroesophageal reflux disease)     Heart attack 05/18/2022    Heart attack 05/23/2022    Hyperlipidemia     Hypertension     Stroke         Examination  Body Structures and Functions, activity limitations and participation restrictions that may impact the plan of care [] LOW: addressing 1-2 elements  [x] MODERATE: 3+ elements  [] HIGH: 4+ elements (please support below)    Moderate / High Support Documentation: Please see evaluation/objective section above.     Clinical Presentation [] LOW: stable  [x] MODERATE: Evolving  [] HIGH: Unstable     Decision Making/ Complexity Score: moderate       The following goals were discussed with the patient and patient is in agreement with them as to be addressed in the treatment plan.     Goals:    Short Term Goals:  6 weeks  Progress    Pain: Patient will demonstrate improved pain by reports of less than or equal to 7/10 worst pain on the verbal rating scale in order to progress toward maximal functional ability and improve quality of life. PC   Function: Patient will demonstrate improved function as indicated by a functional limitation score of less than or equal to 36 out of 100 on FOTO. PC   Mobility: Patient will improve active range of motion to 50% of stated goals, listed in objective measures above, in order to progress towards independence with functional activities.  PC    Strength: Patient will improve strength to 50% of stated goals, listed in objective measures above, in order to progress towards independence with functional activities.  PC   HEP: Patient will demonstrate independence with home exercise program in order to progress toward functional independence. PC     Long Term Goals:  12 weeks  Progress   Pain: Patient will demonstrate improved pain by reports of less than or equal to 6/10 worst pain on the verbal rating scale in order to progress toward maximal functional ability and improve quality of life.   PC   Function: Patient will demonstrate improved function as indicated by a functional limitation score of less than or equal to 36 out of 100 on FOTO. PC   Mobility: Patient will improve active range of motion to stated goals, listed in objective measures above, in order to return to maximal functional potential and improve quality of life. PC   Strength: Patient will improve strength to stated goals, listed in objective measures above, in order to improve functional independence and quality of life. PC   Patient will return to normal ADL's, IADL's, community involvement, recreational activities, and work-related activities with less than or equal to 5/10 pain and maximal function.  PC     Goals Key:  PC= Progressing/Continue; PM= Partially Met;  GM= Goal Met,      DC= Discontinue    Plan   Certification Period/Plan of care expiration: 5/17/2023 to 10/17/2023.    Patient may begin occupational therapy after Speech Therapy or Physical Therapy is complete due to scheduling difficulties. Plan of care extended in case necessary for scheduling      Outpatient Occupational Therapy 1 -2 times weekly through 10/17/2023 to include the following interventions: Therapeutic Exercise, Functional Activities, Patient Education, Home Exercise Program, ADL Training, Transfer/Mobility Training, Manual Therapy, Neuromuscular Reeducation/Sensory, Electrical  Stimulation/TENS/Interferential, Moist Heat/Ice/Paraffin, Functional Standing, Cognitive Preceptual Retraining, and Orthotic Fabrication/Fit/Management.    Ivana Gallo, OT ,OTD, OTR/L      I CERTIFY THE NEED FOR THESE SERVICES FURNISHED UNDER THIS PLAN OF TREATMENT AND WHILE UNDER MY CARE  Physician's comments:      Physician's Signature: ___________________________________________________

## 2023-05-19 ENCOUNTER — OFFICE VISIT (OUTPATIENT)
Dept: ORTHOPEDICS | Facility: CLINIC | Age: 83
End: 2023-05-19
Payer: MEDICARE

## 2023-05-19 ENCOUNTER — CLINICAL SUPPORT (OUTPATIENT)
Dept: SPEECH THERAPY | Facility: HOSPITAL | Age: 83
End: 2023-05-19
Payer: MEDICARE

## 2023-05-19 VITALS — WEIGHT: 135.38 LBS | HEIGHT: 61 IN | BODY MASS INDEX: 25.56 KG/M2

## 2023-05-19 DIAGNOSIS — R47.01 APHASIA: Primary | ICD-10-CM

## 2023-05-19 DIAGNOSIS — Z98.1 HISTORY OF LUMBAR FUSION: ICD-10-CM

## 2023-05-19 DIAGNOSIS — L03.116 CELLULITIS OF LEFT LOWER EXTREMITY: Primary | ICD-10-CM

## 2023-05-19 DIAGNOSIS — M17.11 ARTHRITIS OF KNEE, RIGHT: ICD-10-CM

## 2023-05-19 DIAGNOSIS — R41.841 COGNITIVE COMMUNICATION DISORDER: ICD-10-CM

## 2023-05-19 PROCEDURE — 99999 PR PBB SHADOW E&M-EST. PATIENT-LVL III: CPT | Mod: PBBFAC,,, | Performed by: ORTHOPAEDIC SURGERY

## 2023-05-19 PROCEDURE — 99213 PR OFFICE/OUTPT VISIT, EST, LEVL III, 20-29 MIN: ICD-10-PCS | Mod: 25,S$PBB,, | Performed by: ORTHOPAEDIC SURGERY

## 2023-05-19 PROCEDURE — 92507 TX SP LANG VOICE COMM INDIV: CPT

## 2023-05-19 PROCEDURE — 20610 DRAIN/INJ JOINT/BURSA W/O US: CPT | Mod: PBBFAC,RT | Performed by: ORTHOPAEDIC SURGERY

## 2023-05-19 PROCEDURE — 99213 OFFICE O/P EST LOW 20 MIN: CPT | Mod: 25,S$PBB,, | Performed by: ORTHOPAEDIC SURGERY

## 2023-05-19 PROCEDURE — 99999 PR PBB SHADOW E&M-EST. PATIENT-LVL III: ICD-10-PCS | Mod: PBBFAC,,, | Performed by: ORTHOPAEDIC SURGERY

## 2023-05-19 PROCEDURE — 20610 LARGE JOINT ASPIRATION/INJECTION: R KNEE: ICD-10-PCS | Mod: S$PBB,RT,, | Performed by: ORTHOPAEDIC SURGERY

## 2023-05-19 PROCEDURE — 99213 OFFICE O/P EST LOW 20 MIN: CPT | Mod: PBBFAC | Performed by: ORTHOPAEDIC SURGERY

## 2023-05-19 RX ORDER — AMOXICILLIN AND CLAVULANATE POTASSIUM 875; 125 MG/1; MG/1
1 TABLET, FILM COATED ORAL 2 TIMES DAILY
Qty: 14 TABLET | Refills: 0 | Status: SHIPPED | OUTPATIENT
Start: 2023-05-19 | End: 2023-07-12

## 2023-05-19 RX ADMIN — Medication 4 ML: at 11:05

## 2023-05-19 NOTE — PROGRESS NOTES
Subjective:     Patient ID: Leslie Wood is a 82 y.o. female.    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  She had a CT scan which I reviewed of her head that showed that she had a stroke and we do not think that she is a candidate for total knee replacement due to medical conditions.  I did tell him anesthesia might bring her level of function to 1 level lower than when she was preop and we should avoid it at this time.  She needs to recuperate completely from her stroke.  Her neurologist at told what ever she gets after 1 year in of therapy is there is what she is going to get.  She is going to physical therapy at the [facility] and they want something about her knee if they can strengthen that.  She does have a brace for her back that she wears but not for her knee.  She uses a walker to get around.  She is here with her granddaughter and we discussed natural products.  She knows she cannot take NSAIDs because she is on Plavix and will injure her stomach and history of GERD the.  She needs to take natural with stuff.  In the future I did tell her steroid might be the only option for her in may end up being prednisone pills when time comes in the future   She denies loss of bowel bladder control she is alert today and oriented and answering questions appropriately  Granddaughter is here with her helping.      05/19/2023   Severe right knee pain   Scratched by a dog on the left calf that is painful   Patient stated that the right knee Depo-Medrol injection given 03/24/2023 wore out 2 weeks ago.  She is willing to proceed with viscosupplementation since we are avoiding having surgery at all cost.  She stated the dog scratched her and ran into her and caused some bruising and worried about an area that seems  to be hard and inflamed.  She is using her Rollator to get around.  She is not a surgical candidate due to medical issues.  She can not take NSAIDs she is on Plavix and history of GERD   She is here with granddaughter very pleasant alert oriented telling jokes.  Past Medical History:   Diagnosis Date    Arthritis     Cataract     GERD (gastroesophageal reflux disease)     Heart attack 05/18/2022    Heart attack 05/23/2022    Hyperlipidemia     Hypertension     Stroke      Past Surgical History:   Procedure Laterality Date    ADENOIDECTOMY      ADRENAL GLAND SURGERY      APPENDECTOMY      BACK SURGERY      fusion l 4-5 s 1,2,3  fusion l 2-3    CORONARY ANGIOGRAPHY N/A 5/20/2022    Procedure: ANGIOGRAM, CORONARY ARTERY;  Surgeon: Domingo Martins MD;  Location: [facility] CATH LAB;  Service: Cardiology;  Laterality: N/A;    CORONARY ANGIOGRAPHY N/A 5/24/2022    Procedure: ANGIOGRAM, CORONARY ARTERY;  Surgeon: Domingo Martins MD;  Location: [facility] CATH LAB;  Service: Cardiology;  Laterality: N/A;    EYE SURGERY      HEMORRHOID SURGERY      HERNIA REPAIR      HYSTERECTOMY      indirect lumbar decompression      percutaneous placement of interspinous extension blocker    LEFT HEART CATHETERIZATION Left 5/20/2022    Procedure: CATHETERIZATION, HEART, LEFT;  Surgeon: Domingo Martins MD;  Location: [facility] CATH LAB;  Service: Cardiology;  Laterality: Left;    TONSILLECTOMY       Family History   Problem Relation Age of Onset    Heart disease Mother     Hypertension Mother     Diabetes Mother     Hypertension Father     Kidney disease Father     Breast cancer Sister      Social History     Socioeconomic History    Marital status:    Tobacco Use    Smoking status: Never    Smokeless tobacco: Never   Substance and Sexual Activity    Alcohol use: No    Drug use: No    Sexual activity: Not Currently     Medication List with Changes/Refills   New Medications    AMOXICILLIN-CLAVULANATE 875-125MG (AUGMENTIN) 875-125 MG PER TABLET     Take 1 tablet by mouth 2 (two) times daily.   Current Medications    ASPIRIN (ECOTRIN) 81 MG EC TABLET    Take 1 tablet (81 mg total) by mouth once daily.    ATORVASTATIN (LIPITOR) 80 MG TABLET    Take 0.5 tablets (40 mg total) by mouth every morning.    CLOPIDOGREL (PLAVIX) 75 MG TABLET    Take 1 tablet (75 mg total) by mouth once daily. for 21 days    DULOXETINE (CYMBALTA) 30 MG CAPSULE    Take 30 mg by mouth once daily.    FUROSEMIDE (LASIX) 20 MG TABLET    Take 1 tablet (20 mg total) by mouth once daily.    LOSARTAN (COZAAR) 50 MG TABLET    Take 1 tablet (50 mg total) by mouth 2 (two) times a day.    METOPROLOL SUCCINATE (TOPROL-XL) 25 MG 24 HR TABLET    Take 1 tablet (25 mg total) by mouth once daily.    NIFEDIPINE (ADALAT CC) 30 MG TBSR    Take 1 tablet (30 mg total) by mouth once daily.    NUCYNTA 50 MG TAB    Take 1 tablet (50 mg total) by mouth every 8 (eight) hours as needed (severe pain). RESTART WHEN OK PER PAIN MANAGEMENT PROVIDER    TIZANIDINE (ZANAFLEX) 4 MG TABLET         Review of patient's allergies indicates:   Allergen Reactions    Acetaminophen     Amitriptyline     Hydrochlorothiazide      Causes muscle cramping    Lisinopril      hyperkalemia    Oxycodone     Percocet [oxycodone-acetaminophen] Other (See Comments)     Seizures    Belbuca [buprenorphine hcl] Nausea And Vomiting and Other (See Comments)     Black out     Codeine Nausea Only and Rash    Prazosin Other (See Comments)     dizziness     Review of Systems   Constitutional: Negative for decreased appetite.   HENT:  Negative for tinnitus.    Eyes:  Negative for double vision.   Cardiovascular:  Negative for chest pain.   Respiratory:  Negative for wheezing.    Hematologic/Lymphatic: Negative for bleeding problem.   Skin:  Negative for dry skin.   Musculoskeletal:  Positive for arthritis, back pain, joint pain and muscle weakness. Negative for gout, neck pain and stiffness.   Gastrointestinal:  Negative for abdominal pain.    Genitourinary:  Negative for bladder incontinence.   Neurological:  Negative for numbness, paresthesias and sensory change.   Psychiatric/Behavioral:  Negative for altered mental status.      Objective:   Body mass index is 25.58 kg/m².  There were no vitals filed for this visit.       General    Constitutional: She is oriented to person, place, and time. She appears well-developed.   HENT:   Head: Atraumatic.   Eyes: EOM are normal.   Pulmonary/Chest: Effort normal.   Neurological: She is alert and oriented to person, place, and time.   Psychiatric: Judgment normal.         Ambulating well with a walker very steady  In the sitting position her pelvis is level  Bilateral hips passive motion without pain in the groin.    Hip flexors and abductors and adductors are slightly weak at 5-/5   Right knee with mild to moderate swelling.  There is no warmness to touch.  There is crepitus with motion.  She has 5-120 degrees and range of motion.  No defect in the patella or quadriceps tendon.  There is weakness around 4/5 in the quads and hamstrings.  Pain is throughout the knee joint   Left knee mild degeneration and mild tenderness.  There is no swelling.  Good motion 0-125.  No defect in the patella or quadriceps tendon   Left calf with scratches on the medial aspect proximal 3rd.  There is resolving subcu talus bleed.  There is erythema and induration around 2 by 3 cm where the scratches are.  There is ecchymosis around the area.  Calves are soft bilaterally and nontender  There is multiple varicosities in the lower extremity  Skin over the knees within normal limits no obvious lesions    Relevant imaging results reviewed and interpreted by me, discussed with the patient and / or family today     X-ray 11/10/2022 of the hips within normal limits no evidence of arthritis  X-ray of the knees I 11/10/2022 on the right side showing there is a suprapatellar hematoma there is marginal osteophytic changes there is medial  subluxation of the femur on the tibia consistent with severe arthritic change.  Assessment:     Encounter Diagnoses   Name Primary?    Arthritis of knee, right     History of lumbar fusion     Cellulitis of left lower extremity Yes        Plan:   Cellulitis of left lower extremity  -     amoxicillin-clavulanate 875-125mg (AUGMENTIN) 875-125 mg per tablet; Take 1 tablet by mouth 2 (two) times daily.  Dispense: 14 tablet; Refill: 0    Arthritis of knee, right  -     Large Joint Aspiration/Injection: R knee    History of lumbar fusion         Patient Instructions   You had been scratched by a dog on the left calf and there is induration in there and redness I would like you to start on Augmentin 875 mg once a day for 7 days.  You can continue if it still red and inflamed maximum 14 days  Right knee arthritis the cortisone shot helped you for 6-8 weeks right now it is painful again at 9/10 we will proceed with injecting rooster comb gel by the name of Monovisc   Monovisc injected into the right knee 05/19/2023   Ice the needed next few days in might swell up on you or hurt more  It will take Monovisc roughly 4-6 weeks to work  If it helps you can have it repeated once every 6 months  You can still have cortisone once every 3 months also if you needed  I will see you back in 3 months   Continue using the Rollator when you ambulate        Disclaimer: This note was prepared using a voice recognition system and is likely to have sound alike errors within the text.

## 2023-05-19 NOTE — PROCEDURES
Large Joint Aspiration/Injection: R knee    Date/Time: 5/19/2023 11:00 AM  Performed by: Luis Ibarra MD  Authorized by: Luis Ibarra MD     Consent Done?:  Yes (Verbal)  Indications:  Arthritis  Site marked: the procedure site was marked    Timeout: prior to procedure the correct patient, procedure, and site was verified      Local anesthesia used?: Yes    Local anesthetic:  Lidocaine 1% without epinephrine    Details:  Needle Size:  22 G  Ultrasonic Guidance for needle placement?: No    Approach:  Anterolateral  Location:  Knee  Site:  R knee  Medications:  4 mL hyaluronate sodium, stabilized 88 mg/4 mL  Patient tolerance:  Patient tolerated the procedure well with no immediate complications

## 2023-05-19 NOTE — PATIENT INSTRUCTIONS
You had been scratched by a dog on the left calf and there is induration in there and redness I would like you to start on Augmentin 875 mg once a day for 7 days.  You can continue if it still red and inflamed maximum 14 days  Right knee arthritis the cortisone shot helped you for 6-8 weeks right now it is painful again at 9/10 we will proceed with injecting rooster comb gel by the name of Monovisc   Monovisc injected into the right knee 05/19/2023   Ice the needed next few days in might swell up on you or hurt more  It will take Monovisc roughly 4-6 weeks to work  If it helps you can have it repeated once every 6 months  You can still have cortisone once every 3 months also if you needed  I will see you back in 3 months   Continue using the Rollator when you ambulate

## 2023-05-21 NOTE — PROGRESS NOTES
[facility]   Physical Therapy Treatment Note + UP[facility]    Name: Leslie CASTELLON [facility] Number: [id number]    Therapy Diagnosis:   Encounter Diagnosis   Name Primary?    Impaired functional mobility, balance, gait, and endurance Yes     Physician: Sepideh Peters,*    Visit Date: 5/15/2023    Physician Orders: PT Eval and Treat   Medical Diagnosis from Referral: R29.6 (ICD-10-CM) - Falls frequently  Evaluation Date: 2/27/2023  Authorization Period Expiration: 2/17/24  Plan of Care Expiration: 4/28/23  Progress Note Due: 5/6/23  Visit # / Visits authorized: 12/12 (+eval)  FOTO: 0/3 not appropriate due to poor insight and cognitive deficits     Precautions: Standard and Fall     PTA Visit #: 0/5     Time In: 1045  Time Out: 1145  Total Billable Time: 60 minutes    SUBJECTIVE     Pt reports: Patient reports that she does still experience a bit of pain at the lower back region.  She also reports of pain at the right knee.  She reports that she was not compliant with Home Exercise Program as she left it in her granddaughter's car.   Response to previous treatment: She responded well to last session.   Functional change: She reports no functional change since last session.     Pain: 4/10  Location: right knee and lower back    OBJECTIVE     Objective Measures updated at progress report unless specified.   Functional Assessments:  Test Eval Score 3/30/23 4/27/23   TUG 15.35 seconds 13.98 seconds NT due to patient with wedged heel shoes today   5 x Sit to Stand 23.55 seconds 14.14 seconds 12.45 seconds   4 Square Step Test 26 seconds NT NT due to patient with wedged heel shoes today         Patient reports a score of 46/56 on the Encarnacion Balance Assessment on 4/27/23.              Treatment     Leslie received the treatments listed below:      THERAPEUTIC EXERCISES to develop strength, endurance, ROM, flexibility, posture, and core stabilization for (30) minutes including:    Intervention Performed  Today    Supine Stretches   X  X  x - 3x30 seconds bilateral for each:  - lower trunk rotation   - single knee to chest  - hamstring/ heelcord   Seated Stretches  - 3x30 seconds bilateral for each:  - hamstring/ heelcord  - hip adductor   Standing Stretches  - 3x15 seconds bilateral for each:  - IT band   - heelcord    Seated lower extremity exercises          - hip flexion 2x10 bilateral   - hamstring curl 2x10 bilateral   - dorsiflexion/ plantarflexion 2x10 bilateral with green band  - eversion 1x10 bilateral   - toe curls and flares 1x20 bilateral   - hip adduction against ball 1x10 with 3 second hold  - hip abduction against belt 1x10 with 3 second hold   Standing lower extremity exercises  X  X  X  x - hip flexion 2x10 bilateral   - hip abduction 2x10 bilateral   - hip extension 2x10 bilateral   - heel raise 2x10    NuStep for endurance x - Level 2, 6 minutes   Bicycle for endurance and ROM  - Level 1 x 6 minutes   Supine x - Quad set with short arc quad 2x10 with 3 second hold   - dorsiflexion against green band 2x10 bilateral    Lower Trunk Rotations x 10 second holds x 10   Pelvic tilts x 10 second holds x 10   Bridges   1x10   Abdominal Bracing x Knee to chest - 1x10 bilateral    Straight leg raise   - 1x10 bilateral    Neoprene wrap to Right knee for all exercises    neuromuscular re-education activities to improve: Balance, Coordination, Kinesthetic, Sense, Proprioception, and Posture for (0) minutes. The following activities were included:    Intervention Performed Today    Air Ex Beam        - Tandem holds 5 seconds, 3x's   - Tandem walking forward/backward 3x's each  - Side stepping 3x's each direction  - tandem walking over hurdles forward and side stepping 2x's each  - heel lifts and toe lifts off of beam with 2-0 hand supports   - single leg standing while tapping opposite foot forward and back on beam   Orange Rubber Beam    - Tandem holds 3 seconds, 3x's bilateral   - Tandem walking forward 4x's    - Side stepping 2x's each direction  - single leg standing while tapping opposite foot forward and back on beam   Air Ex Pad              - Weight shifts Left to Right 2x10  - Weight shifts forward/back 2x10  - single leg standing holds   - Eyes closed 10 seconds  - backward pivot steps with 1 hand support 1x8 bilateral   - standing on pad while toss/catching ball to wall 1x8  - One lower extremity on pad and opposite lower extremity on step tossing ball to wall    Hand paddled per Neuro LEONIE technique and for entirety of session     Core Exercises in supine  Bilateral lower extremities over Tball for each:   - lower trunk rotation 2x10  - bilateral knees to chest 2x10  - posterior pelvic tilt to bridge 2x10   Parallel bars      - walking backward 4x8 ft with 2-1 hand support  - braided walking 4x's length of bars  - narrow base standing without hand support performing head turns/nods 1x5 each, arm reach/lifts 1x5 each   Trunk extension for posture  - 2x10 over pillow and ball at low back    Sit to squat without hand support  2x10 from progressively lowered mat   Step ups  6 inch step  - 2 sets of 1 minute on each leg   Functional Re-Tests  - see above   Encarnacion Balance Scale  - see above   Agility Drills on Turf  - side stepping 2x20 ft  - backward walking 2x20 ft - without shoes  - tandem walking 2x15 ft - without shoes       THERAPEUTIC ACTIVITIES to improve dynamic and functional performance for () minutes including:    Intervention Performed Today    Patient and her granddaughter educated on Home Exercise Program for balance.  Also educated on wearing tennis shoes to future sessions.  Discussed the process of changing schedule to get her seen by an ortho PT 1 day/week then by me 1day/week  - Patient and granddaughter verbalized good understanding of education provided.                                              MANUAL THERAPY TECHNIQUES were applied for (30) minutes, including:    Manual Intervention  Performed Today    Soft Tissue Mobilization x Bilateral thoracolumbar spine   Joint Mobilizations [x] PIVM lumbar spine    []     []    Functional Dry Needling  [x] Bilateral thoracolumbar spine     Plan for Next Visit: Continue as needed         Gait Training: to improve functional mobility and safety for  () minutes, including:  - working on increasing step length and upright posture with gait sequence, ambulating 200ft and 100ft with contact guard assist for safety awareness.     Patient Education and Home Exercises     Home Exercises Provided and Patient Education Provided     Education provided:   - 3/14/23 Patient educated on Home Exercise Program for balance   - 3/16/23 Patient encouraged to stay compliant with Home Exercise Program   - 4/27/23 discussed continuing with James PT for dry needling 1 day/week for the next 4 weeks.   Written Home Exercises Provided: yes. Exercises were reviewed and Leslie was able to demonstrate them prior to the end of the session.  Leslie verbalized good  understanding of the education provided. See EMR under Patient Instructions for exercises provided during therapy sessions    ASSESSMENT     Patient reports of more knee pain today.  The patient does appear to repetitively sublux the knee between flexion and extension.  It does feel as though the tibia subluxes on the femur.  Feels as though that is happening in all 3 planes but very slightly.  She and her family understand that she is not a good surgical candidate secondary to history of CVA.  She is managing with a brace and pain reliever medication/injection.  Spoke with patient about regular exercise regimen outside of PT.  She lives in [place] and has her family members drive her into PT.  It might be beneficial to figure out a way to arrange transportation so that her family members are not required to perform all the driving.    Leslie Is progressing well towards her goals.   Pt prognosis is Good.     Pt will continue to  benefit from skilled outpatient physical therapy to address the deficits listed in the problem list box on initial evaluation, provide pt/family education and to maximize pt's level of independence in the home and community environment.     Pt's spiritual, cultural and educational needs considered and pt agreeable to plan of care and goals.     Anticipated barriers to physical therapy: co-morbidities, sedentary lifestyle, chronicity of condition, and lack of understanding of condition     GOALS:     Short Term Goals:  4 weeks Progress    Function: Patient will demonstrate improved function as indicated by a functional limitation score improved by 3 points from initial measure.  PC   Strength: Patient will demonstrate improved strength by performing 5x sit to  20 seconds in order to progress towards independence with functional activities.  Met   Balance: Complete Encarnacion Balance Scale and set goals accordingly.  Met   Coordination: Patient will demonstrate improved time of 13 sec on TUG to demonstrate improved coordination and safety with mobility. PC   HEP: Patient will demonstrate independence with HEP in order to progress toward functional independence.  Met   Determine the need of Custom Wheelchair Eval to improve patient's positioning and independence with pressure reliefs and independence with home and community mobility to decrease burden of care. D/C not needed at this time.              Long Term Goals:  8 weeks Progress   Function: Patient will demonstrate improved function as indicated by a functional limitation score improved 5 points from initial measure.  PC   Strength: Patient will demonstrate improved strength by performing 5x sit to  17 seconds in order to progress towards independence with functional activities.  Met   Patient will return to ADL's, IADL's, community involvement, recreational activities, and work-related activities with less than or equal to 5/10 pain and maximal  function.  PC   Balance: Improve Encarnacion Balance score to 50/56 to demonstrate improved balance and decreased risk of falls.  PC   Coordination: Patient will demonstrate improved time of 11 sec on TUG to demonstrate improved coordination and safety with mobility. PC   HEP: Patient educated on HEP in preparation for d/c verbalizing and demonstrating good understanding of topics discussed.   PC            Goals Key:  PC= progressing/continue;        PM= partially met;             DC= discontinue    PLAN     Continue Plan of Care (POC) and progress per patient tolerance. See Treatment section for exercise progression.    Leoncio Lujan, PT

## 2023-05-22 NOTE — PROGRESS NOTES
[facility]  Speech Therapy Progress Note- Neurological Rehabilitation    Date: 5/19/2023     Name: Leslie Wood   MRN: [id number]   Therapy Diagnosis:   No diagnosis found.      Physician: Sammy Juarez MD  Physician Orders: [id number] - AMB REFERRAL/CONSULT TO SPEECH THERAPY  Medical Diagnosis: R47.01 (ICD-10-CM) - Aphasia    Visit #/ Visits Authorized: 17/20  Date of Evaluation:  2/17/2023   Insurance Authorization Period: 2/23/2023 - 12/31/2023   Plan of Care Expiration Date: 6/26/2023   Extended Plan of Care:  NA   Progress Note: 5/19/2023  Visits Cancelled: 0  Visits No Show: 0    Time In:  10:30 AM   Time Out: 11:00 PM  TOTAL TIME:  30 minutes      Precautions: Standard  Subjective:   Patient reported to her session with her granddaughter.   She was compliant to home exercise program.     Response to previous treatment: VNEST goal met today.     Pain Scale:  6/10 on a Visual Analog Scale currently.   Pain Location:  right knee and back   Objective:   TIMED  Procedure Min.   Cognitive Therapeutic Interventions, first 15 minutes CPT 60385  0   Cognitive Therapeutic Interventions, each additional 15 minutes CPT 65146  0         UNTIMED  Procedure Min.   Speech- Language- Voice Therapy  30     Total Timed Units: 0  Total Untimed Units:1  Charges Billed/Number of units: 55549/1    Short Term Goals: (6 weeks) Current Progress:   Patient will describe functional objects/images to facilitate a strategy of description in order to increase transfer of intended message with 80% accuracy and moderate clinician cueing.     Progressing/ Not Met 5/19/2023   Not formally addressed.     Patient will complete word finding task (i.e. Creating subject, verb, object pairs in VNEST protocol) with 90% accuracy moderate assistance to improve word fluency.       GOAL MET 5/22/2023 Completed today X4. Patient able to create simple sentences with 95% accuracy independently. She required additional cueing to expand sentences  with when, where, or why. She was able to expand sentences with 90% accuracy given minimal-moderate cueing.     GOAL MET 5/22/2023   Patient will utilize word finding strategies in structured tasks with 70% accuracy and minimal cues.      Progressing/ Not Met 5/19/2023   Not formally addressed.    Patient will summarize read material to improve word fluency, word finding, and reading comprehension in Attentive Reading and Constrained Summarization (ARCS) protocol with 80% accuracy and minimal verbal assistance across 2-3 sessions.     Progressing/ Not Met 5/19/2023   Not formally addressed.      Patient will write 7-10 word sentences with no more than 2 syntactic/grammatical errors given minimal cueing.      Progressing/ Not Met 5/19/2023   Not formally addressed.      Patient will complete functional writing task with complete, coherent, and accurately spelled responses with 80% accuracy and minimal verbal and visual assistance across 3-5 sessions.     Progressing/ Not Met 5/19/2023   Not formally addressed.      Patient will complete RBANS assessment to further assess cognitive communication skills.        GOAL MET 2/27/2023  Completed today. See below for results.       GOAL MET 2/27/2023    Patient will sustain attention to a moderate task (auditory or visual) for 2 minutes with 90% accuracy or no more than 1 redirection.     Visual: GOAL MET 3/20/2023  Auditory: GOAL MET 3/20/2023  VISUAL:   Task 1: Visual sustained attention task completed in which patient visually scanned an article looking for certain letters. She completed this task with 87% accuracy independently (36/41). Clinician declan a purple line on the left hand side to serve an anchor. Patient utilized a blank paper to keep track of which line she was working on.     Task 2: Visual sustained attention task completed in which patient visually scanned a phone bill looking for unexplained calls. She completed this task with 90% accuracy given minimal  cueing. The one error that she made was due to skipping over an area code that was more offset to the left than the others. Clinician and patient dicussed left neglect in depth that is likely due to her first cerebral vascular accident (right cerebral vascular accident).     AUDITORY:  Task 1: Constant Therapy: Understanding Voicemails was completed today with 90% accuracy independently (improvement from last session with 70% accuracy). She required minimal assistance for navigating the application (pressing the next button, replaying the voicemail)    GOAL MET 3/20/2023    Patient will complete selective attention tasks (auditory or visual) with 90% accuracy independently increase selective attention.    Visual: GOAL MET 4/6/2023  Auditory: GOAL MET 5/12/2023  Visual:   Medication management task. Patient was asked to sort three medications into a weekly pill box using the instructions provided on each pill bottle. She completed this task with 92% accuracy (correctly sorted 26 out of 28 individual pills) independently. Clinician and patient discussed the two missed pills and potential negative consequences of missing medication.     Auditory:  Patient completed auditory selective attention task of immediate recall of short stories (3 sentences). Initially she recalled stories with 50% accuracy. Clinician and patient discussed strategies for improved accuracy such as repeating the story and using visualization. Using these strategies, she then answered questions with 90% accuracy.    Patient will use attention shifting strategies to shift attention between two tasks (auditory or visual) with no more than 3 cues or 90% accuracy to improve alternating attention. Visual:   Not formally addressed.     Auditory:   Not formally addressed.     Patient will complete short term recall tasks after a 5 minutes delay with 90% accuracy  independently  with use of memory strategies to improve recall of information and  generalization of memory strategies. Not formally addressed.    Patient will use Goal Plan Action Review strategy to complete moderate to complex reasoning, planning, or organization tasks with 90% accuracy independently to improve functional executive function skills. Not formally addressed.    Patient will complete a functional task to improve attention, memory and/or executive functions (I.e. sample bill paying activity, recipe, or multiple choice comprehension questions to 1 paragraphs) with 80% accuracy and natural environment noise distractions (TV news background, music, etc.). Not formally addressed.    Patient will be educated regarding cognitive deficits as applicable to the patient's life for increased awareness and will execute returned demonstration of information with 80% accuracy.           GOAL MET 3/6/2023  Education provided regarding results from her assessment, the hierarchy of neuropsychological functions, and the role of attention in other executive functions. Patient and granddaughter verbalized understanding.     GOAL MET 3/6/2023    Patient will independently generate strategies to improve ability to complete tasks with enhanced accuracy and time, based on review of objective previous performance on trials of functional tasks with 80% accuracy.  Not formally addressed.    Given implementation of strategies, patient will complete a task following initial attempt with 90% accuracy and reported reduced number on the relative scale of cognitive load when compared to previous trial.  Not formally addressed.    Patient will use external memory strategies in order to recall important dates, events, appointments, or tasks to be completed with 90% accuracy independently.  Not formally addressed.        Patient Education/Response:   Education provided on progress thus far. Patient verbalized understanding of all discussed.     Home program established: yes-patient was given several visual alternating  attention tasks to complete.   Exercises were reviewed and Leslie was able to demonstrate them prior to the end of the session.  Leslie demonstrated good  understanding of the education provided.     See Electronic Medical Record under Patient Instructions for exercises provided throughout therapy.  Assessment:   PROGRESS:  Patient is very motivated to improve and participates in all therapy tasks presented. She has met four goals since the initiation of therapy and continues to progress towards other goals. She reports improvements in her daily life. She continues to demonstrate difficulty with attention and short term memory. She will benefit from continued speech therapy to address the above deficits and to utilize strategies as needed.     Leslie is progressing well towards her goals.Current goals remain appropriate. Goals will be updated as needed.     Patient prognosis is Good. Patient will continue to benefit from skilled outpatient speech and language therapy to address the deficits listed in the problem list on initial evaluation, provide patient/family education and to maximize patient's level of independence in the home and community environment.     Medical necessity is demonstrated by the following IMPAIRMENTS:  Language deficits impact overall quality of life, ability to participation in social and community interactions, and the ability to communicate important medical information if needed.   Cognitive-communication deficits impact overall quality of life and the ability to safely and independently complete activities of daily living.     Barriers to Therapy: none   Patient's spiritual, cultural and educational needs considered and patient agreeable to plan of care and goals.  Plan:   Continue Plan of Care with focus on sustained/selective attention for functional daily tasks.     Becca Blanco, CCC-SLP, CBIS   Speech Language Pathologist   Certified Brain Injury Specialist   5/22/2023 \

## 2023-05-26 ENCOUNTER — CLINICAL SUPPORT (OUTPATIENT)
Dept: SPEECH THERAPY | Facility: HOSPITAL | Age: 83
End: 2023-05-26
Payer: MEDICARE

## 2023-05-26 ENCOUNTER — DOCUMENTATION ONLY (OUTPATIENT)
Dept: CARDIOLOGY | Facility: HOSPITAL | Age: 83
End: 2023-05-26
Payer: MEDICARE

## 2023-05-26 DIAGNOSIS — R47.01 APHASIA: Primary | ICD-10-CM

## 2023-05-26 DIAGNOSIS — R41.841 COGNITIVE COMMUNICATION DISORDER: ICD-10-CM

## 2023-05-26 PROCEDURE — 97130 THER IVNTJ EA ADDL 15 MIN: CPT | Mod: KX

## 2023-05-26 PROCEDURE — 97129 THER IVNTJ 1ST 15 MIN: CPT | Mod: KX

## 2023-05-26 NOTE — PROGRESS NOTES
Patient had vital connected at [facility] on may 17 however she was not being monitored as device (phone) was dead, patient states that she did'nt know she had to charge nightly.       She changed patch on Wednesday after receiving call from vital connect but did not power the patch on.            I activated the current patch by powering it on and pairing to device as well as gave patient a new patch and 14 day monitoring restarts 5/26/23 and ends 6/9/2023 and patient and granddaughter verbalized understanding.aa

## 2023-05-26 NOTE — PROGRESS NOTES
[facility]  Speech Therapy Treatment Note- Neurological Rehabilitation    Date: 5/26/2023     Name: Leslie Wood   MRN: [id number]   Therapy Diagnosis:   Encounter Diagnoses   Name Primary?    Aphasia Yes    Cognitive communication disorder        Physician: Sammy Juarez MD  Physician Orders: [id number] - AMB REFERRAL/CONSULT TO SPEECH THERAPY  Medical Diagnosis: R47.01 (ICD-10-CM) - Aphasia    Visit #/ Visits Authorized 20/20  Date of Evaluation:  2/17/2023   Insurance Authorization Period: 2/23/2023 - 12/31/2023   Plan of Care Expiration Date: 6/26/2023   Extended Plan of Care:  NA   Progress Note: 6/19/2023  Visits Cancelled: 0  Visits No Show: 0    Time In:  11:25 AM   Time Out: 12:25 PM  TOTAL TIME: 60 minutes      Precautions: Standard  Subjective:   Patient reported to her session with her granddaughter.   She was compliant to home exercise program.     Response to previous treatment: continued progress towards     Pain Scale:  8/10 on a Visual Analog Scale currently.   Pain Location:  right knee \  Objective:   TIMED  Procedure Min.   Cognitive Therapeutic Interventions, first 15 minutes CPT 09327  0   Cognitive Therapeutic Interventions, each additional 15 minutes CPT 42659  0         UNTIMED  Procedure Min.   Speech- Language- Voice Therapy  60     Total Timed Units: 4  Total Untimed Units: 0  Charges Billed/Number of units: 79786/1; 71356/3    Short Term Goals: (6 weeks) Current Progress:   Patient will describe functional objects/images to facilitate a strategy of description in order to increase transfer of intended message with 80% accuracy and moderate clinician cueing.     Progressing/ Not Met 5/26/2023   Not formally addressed.     Patient will complete word finding task (i.e. Creating subject, verb, object pairs in VNEST protocol) with 90% accuracy moderate assistance to improve word fluency.       GOAL MET 5/22/2023 Completed today X4. Patient able to create simple sentences with 95%  accuracy independently. She required additional cueing to expand sentences with when, where, or why. She was able to expand sentences with 90% accuracy given minimal-moderate cueing.     GOAL MET 5/22/2023   Patient will utilize word finding strategies in structured tasks with 70% accuracy and minimal cues.      Progressing/ Not Met 5/26/2023   Not formally addressed.    Patient will summarize read material to improve word fluency, word finding, and reading comprehension in Attentive Reading and Constrained Summarization (ARCS) protocol with 80% accuracy and minimal verbal assistance across 2-3 sessions.     Progressing/ Not Met 5/26/2023   Not formally addressed.      Patient will write 7-10 word sentences with no more than 2 syntactic/grammatical errors given minimal cueing.      Progressing/ Not Met 5/26/2023   Not formally addressed.      Patient will complete functional writing task with complete, coherent, and accurately spelled responses with 80% accuracy and minimal verbal and visual assistance across 3-5 sessions.     Progressing/ Not Met 5/26/2023   Not formally addressed.      Patient will complete RBANS assessment to further assess cognitive communication skills.        GOAL MET 2/27/2023  Completed today. See below for results.       GOAL MET 2/27/2023    Patient will sustain attention to a moderate task (auditory or visual) for 2 minutes with 90% accuracy or no more than 1 redirection.     Visual: GOAL MET 3/20/2023  Auditory: GOAL MET 3/20/2023  VISUAL:   Task 1: Visual sustained attention task completed in which patient visually scanned an article looking for certain letters. She completed this task with 87% accuracy independently (36/41). Clinician declan a purple line on the left hand side to serve an anchor. Patient utilized a blank paper to keep track of which line she was working on.     Task 2: Visual sustained attention task completed in which patient visually scanned a phone bill looking for  unexplained calls. She completed this task with 90% accuracy given minimal cueing. The one error that she made was due to skipping over an area code that was more offset to the left than the others. Clinician and patient dicussed left neglect in depth that is likely due to her first cerebral vascular accident (right cerebral vascular accident).     AUDITORY:  Task 1: Constant Therapy: Understanding Voicemails was completed today with 90% accuracy independently (improvement from last session with 70% accuracy). She required minimal assistance for navigating the application (pressing the next button, replaying the voicemail)    GOAL MET 3/20/2023    Patient will complete selective attention tasks (auditory or visual) with 90% accuracy independently increase selective attention.    Visual: GOAL MET 4/6/2023  Auditory: GOAL MET 5/12/2023  Visual:   Medication management task. Patient was asked to sort three medications into a weekly pill box using the instructions provided on each pill bottle. She completed this task with 92% accuracy (correctly sorted 26 out of 28 individual pills) independently. Clinician and patient discussed the two missed pills and potential negative consequences of missing medication.     Auditory:  Patient completed auditory selective attention task of immediate recall of short stories (3 sentences). Initially she recalled stories with 50% accuracy. Clinician and patient discussed strategies for improved accuracy such as repeating the story and using visualization. Using these strategies, she then answered questions with 90% accuracy.    Patient will use attention shifting strategies to shift attention between two tasks (auditory or visual) with no more than 3 cues or 90% accuracy to improve alternating attention. Visual:   Patient completed visual alternating attention task of following written instructions on the left hand side of a page to connect 30 dots. She completed this task with  90%accuracy given consistent minimal cueing.     Auditory:   Patient alternated between various categories aloud with cues for use of strategies such as visualization, chunking, alphabetical organization, etc.   Trial 1: She completed this task with 60% accuracy requiring cueing for recalling the categories and recalling which items she had already listed.     Trial 2: She completed this task with 80% accuracy requiring cueing for recalling the categories and recalling which items she had already listed.     Patient completed auditory alternating attention task of following two step directions (task 1) and immediate recall of short stories (task 2). She completed task 1 with 75% accuracy and task with 100% accuracy given one repetition of each story.    Patient will complete short term recall tasks after a 5 minutes delay with 90% accuracy  independently  with use of memory strategies to improve recall of information and generalization of memory strategies. Not formally addressed.    Patient will use Goal Plan Action Review strategy to complete moderate to complex reasoning, planning, or organization tasks with 90% accuracy independently to improve functional executive function skills. Not formally addressed.    Patient will complete a functional task to improve attention, memory and/or executive functions (I.e. sample bill paying activity, recipe, or multiple choice comprehension questions to 1 paragraphs) with 80% accuracy and natural environment noise distractions (TV news background, music, etc.). Not formally addressed.    Patient will be educated regarding cognitive deficits as applicable to the patient's life for increased awareness and will execute returned demonstration of information with 80% accuracy.           GOAL MET 3/6/2023  Education provided regarding results from her assessment, the hierarchy of neuropsychological functions, and the role of attention in other executive functions. Patient and  granddaughter verbalized understanding.     GOAL MET 3/6/2023    Patient will independently generate strategies to improve ability to complete tasks with enhanced accuracy and time, based on review of objective previous performance on trials of functional tasks with 80% accuracy.  Not formally addressed.    Given implementation of strategies, patient will complete a task following initial attempt with 90% accuracy and reported reduced number on the relative scale of cognitive load when compared to previous trial.  Not formally addressed.    Patient will use external memory strategies in order to recall important dates, events, appointments, or tasks to be completed with 90% accuracy independently.  Not formally addressed.        Patient Education/Response:   Education provided on progress thus far. Patient verbalized understanding of all discussed.     Home program established: yes-patient was given several visual alternating attention tasks to complete.   Exercises were reviewed and Leslie was able to demonstrate them prior to the end of the session.  Leslie demonstrated good  understanding of the education provided.     See Electronic Medical Record under Patient Instructions for exercises provided throughout therapy.  Assessment:   Patient is very motivated to improve and participates in all therapy tasks presented. She has met four goals since the initiation of therapy and continues to progress towards other goals. She reports improvements in her daily life. She continues to demonstrate difficulty with attention and short term memory. She will benefit from continued speech therapy to address the above deficits and to utilize strategies as needed.     Leslie is progressing well towards her goals.Current goals remain appropriate. Goals will be updated as needed.     Patient prognosis is Good. Patient will continue to benefit from skilled outpatient speech and language therapy to address the deficits listed in the  problem list on initial evaluation, provide patient/family education and to maximize patient's level of independence in the home and community environment.     Medical necessity is demonstrated by the following IMPAIRMENTS:  Language deficits impact overall quality of life, ability to participation in social and community interactions, and the ability to communicate important medical information if needed.   Cognitive-communication deficits impact overall quality of life and the ability to safely and independently complete activities of daily living.     Barriers to Therapy: none   Patient's spiritual, cultural and educational needs considered and patient agreeable to plan of care and goals.  Plan:   Continue Plan of Care with focus on sustained/selective attention for functional daily tasks.     Becca Blanco, CCC-SLP, CBIS   Speech Language Pathologist   Certified Brain Injury Specialist   5/26/2023

## 2023-05-31 ENCOUNTER — EXTERNAL CHRONIC CARE MANAGEMENT (OUTPATIENT)
Dept: PRIMARY CARE CLINIC | Facility: CLINIC | Age: 83
End: 2023-05-31
Payer: MEDICARE

## 2023-05-31 PROCEDURE — 99490 CHRNC CARE MGMT STAFF 1ST 20: CPT | Mod: S$PBB,,, | Performed by: FAMILY MEDICINE

## 2023-05-31 PROCEDURE — 99439 PR CHRONIC CARE MGMT, EA ADDTL 20 MIN: ICD-10-PCS | Mod: S$PBB,,, | Performed by: FAMILY MEDICINE

## 2023-05-31 PROCEDURE — 99439 CHRNC CARE MGMT STAF EA ADDL: CPT | Mod: PBBFAC,27 | Performed by: FAMILY MEDICINE

## 2023-05-31 PROCEDURE — 99439 CHRNC CARE MGMT STAF EA ADDL: CPT | Mod: S$PBB,,, | Performed by: FAMILY MEDICINE

## 2023-05-31 PROCEDURE — 99490 CHRNC CARE MGMT STAFF 1ST 20: CPT | Mod: PBBFAC | Performed by: FAMILY MEDICINE

## 2023-05-31 PROCEDURE — 99490 PR CHRONIC CARE MGMT, 1ST 20 MIN: ICD-10-PCS | Mod: S$PBB,,, | Performed by: FAMILY MEDICINE

## 2023-06-02 ENCOUNTER — PATIENT MESSAGE (OUTPATIENT)
Dept: INTERNAL MEDICINE | Facility: CLINIC | Age: 83
End: 2023-06-02
Payer: MEDICARE

## 2023-06-02 ENCOUNTER — CLINICAL SUPPORT (OUTPATIENT)
Dept: SPEECH THERAPY | Facility: HOSPITAL | Age: 83
End: 2023-06-02
Payer: MEDICARE

## 2023-06-02 DIAGNOSIS — R47.01 APHASIA: Primary | ICD-10-CM

## 2023-06-02 DIAGNOSIS — R41.841 COGNITIVE COMMUNICATION DISORDER: ICD-10-CM

## 2023-06-02 PROCEDURE — 97129 THER IVNTJ 1ST 15 MIN: CPT | Mod: KX

## 2023-06-02 PROCEDURE — 97130 THER IVNTJ EA ADDL 15 MIN: CPT | Mod: KX

## 2023-06-02 NOTE — PROGRESS NOTES
[facility]  Speech Therapy Treatment Note- Neurological Rehabilitation    Date: 6/2/2023     Name: Leslie Wood   MRN: [id number]   Therapy Diagnosis:   Encounter Diagnoses   Name Primary?    Aphasia Yes    Cognitive communication disorder      Physician: Sammy Juarez MD  Physician Orders: [id number] - AMB REFERRAL/CONSULT TO SPEECH THERAPY  Medical Diagnosis: R47.01 (ICD-10-CM) - Aphasia    Visit #/ Visits Authorized 21/40  Date of Evaluation:  2/17/2023   Insurance Authorization Period: 2/23/2023 - 12/31/2023   Plan of Care Expiration Date: 6/26/2023   Extended Plan of Care:  NA   Progress Note: 6/19/2023  Visits Cancelled: 0  Visits No Show: 0    Time In:  8:50 AM   Time Out: 9:30 PM  TOTAL TIME: 40 minutes      Precautions: Standard  Subjective:   Patient reported to her session with her granddaughter. Patient reported a fall last night resulting in a bruise on her forehead. Granddaughter aware.    She was compliant to home exercise program.     Response to previous treatment: continued progress towards     Pain Scale:  8/10 on a Visual Analog Scale currently.   Pain Location:  right knee   Objective:   TIMED  Procedure Min.   Cognitive Therapeutic Interventions, first 15 minutes CPT 34031  15   Cognitive Therapeutic Interventions, each additional 15 minutes CPT 83665  25         UNTIMED  Procedure Min.   Speech- Language- Voice Therapy  0     Total Timed Units: 3  Total Untimed Units: 0  Charges Billed/Number of units: 43258/1; 89278/2    Short Term Goals: (6 weeks) Current Progress:   Patient will describe functional objects/images to facilitate a strategy of description in order to increase transfer of intended message with 80% accuracy and moderate clinician cueing.     Progressing/ Not Met 6/2/2023   Not formally addressed.     Patient will complete word finding task (i.e. Creating subject, verb, object pairs in VNEST protocol) with 90% accuracy moderate assistance to improve word fluency.        GOAL MET 5/22/2023 Completed today X4. Patient able to create simple sentences with 95% accuracy independently. She required additional cueing to expand sentences with when, where, or why. She was able to expand sentences with 90% accuracy given minimal-moderate cueing.     GOAL MET 5/22/2023   Patient will utilize word finding strategies in structured tasks with 70% accuracy and minimal cues.      Progressing/ Not Met 6/2/2023   Not formally addressed.    Patient will summarize read material to improve word fluency, word finding, and reading comprehension in Attentive Reading and Constrained Summarization (ARCS) protocol with 80% accuracy and minimal verbal assistance across 2-3 sessions.     Progressing/ Not Met 6/2/2023   Not formally addressed.      Patient will write 7-10 word sentences with no more than 2 syntactic/grammatical errors given minimal cueing.      Progressing/ Not Met 6/2/2023   Not formally addressed.      Patient will complete functional writing task with complete, coherent, and accurately spelled responses with 80% accuracy and minimal verbal and visual assistance across 3-5 sessions.     Progressing/ Not Met 6/2/2023   Not formally addressed.      Patient will complete RBANS assessment to further assess cognitive communication skills.        GOAL MET 2/27/2023  Completed today. See below for results.       GOAL MET 2/27/2023    Patient will sustain attention to a moderate task (auditory or visual) for 2 minutes with 90% accuracy or no more than 1 redirection.     Visual: GOAL MET 3/20/2023  Auditory: GOAL MET 3/20/2023  VISUAL:   Task 1: Visual sustained attention task completed in which patient visually scanned an article looking for certain letters. She completed this task with 87% accuracy independently (36/41). Clinician declan a purple line on the left hand side to serve an anchor. Patient utilized a blank paper to keep track of which line she was working on.     Task 2: Visual  sustained attention task completed in which patient visually scanned a phone bill looking for unexplained calls. She completed this task with 90% accuracy given minimal cueing. The one error that she made was due to skipping over an area code that was more offset to the left than the others. Clinician and patient dicussed left neglect in depth that is likely due to her first cerebral vascular accident (right cerebral vascular accident).     AUDITORY:  Task 1: Constant Therapy: Understanding Voicemails was completed today with 90% accuracy independently (improvement from last session with 70% accuracy). She required minimal assistance for navigating the application (pressing the next button, replaying the voicemail)    GOAL MET 3/20/2023    Patient will complete selective attention tasks (auditory or visual) with 90% accuracy independently increase selective attention.    Visual: GOAL MET 4/6/2023  Auditory: GOAL MET 5/12/2023  Visual:   Medication management task. Patient was asked to sort three medications into a weekly pill box using the instructions provided on each pill bottle. She completed this task with 92% accuracy (correctly sorted 26 out of 28 individual pills) independently. Clinician and patient discussed the two missed pills and potential negative consequences of missing medication.     Auditory:  Patient completed auditory selective attention task of immediate recall of short stories (3 sentences). Initially she recalled stories with 50% accuracy. Clinician and patient discussed strategies for improved accuracy such as repeating the story and using visualization. Using these strategies, she then answered questions with 90% accuracy.    Patient will use attention shifting strategies to shift attention between two tasks (auditory or visual) with no more than 3 cues or 90% accuracy to improve alternating attention. Visual:   Patient completed visual alternating attention task of writing numbers and  letters in alternating order. She completed this task with 100% accuracy given moderate cueing. The patient then used written number/letter list to connect number and letters on a sperate page in the correct order with 80% accuracy given consistent moderate cues.     Auditory:   Patient alternated between various categories aloud with cues for use of strategies such as visualization, chunking, alphabetical organization, etc.   Trial 1: She completed this task with 60% accuracy requiring cueing for recalling the categories and recalling which items she had already listed.    Patient will complete short term recall tasks after a 5 minutes delay with 90% accuracy  independently  with use of memory strategies to improve recall of information and generalization of memory strategies. Not formally addressed.    Patient will use Goal Plan Action Review strategy to complete moderate to complex reasoning, planning, or organization tasks with 90% accuracy independently to improve functional executive function skills. Not formally addressed.    Patient will complete a functional task to improve attention, memory and/or executive functions (I.e. sample bill paying activity, recipe, or multiple choice comprehension questions to 1 paragraphs) with 80% accuracy and natural environment noise distractions (TV news background, music, etc.). Not formally addressed.    Patient will be educated regarding cognitive deficits as applicable to the patient's life for increased awareness and will execute returned demonstration of information with 80% accuracy.           GOAL MET 3/6/2023  Education provided regarding results from her assessment, the hierarchy of neuropsychological functions, and the role of attention in other executive functions. Patient and granddaughter verbalized understanding.     GOAL MET 3/6/2023    Patient will independently generate strategies to improve ability to complete tasks with enhanced accuracy and time, based  on review of objective previous performance on trials of functional tasks with 80% accuracy.  Not formally addressed.    Given implementation of strategies, patient will complete a task following initial attempt with 90% accuracy and reported reduced number on the relative scale of cognitive load when compared to previous trial.  Not formally addressed.    Patient will use external memory strategies in order to recall important dates, events, appointments, or tasks to be completed with 90% accuracy independently.  Not formally addressed.        Patient Education/Response:   Education provided on progress thus far. Patient verbalized understanding of all discussed.     Home program established: Continue prior program.   Exercises were reviewed and Leslie was able to demonstrate them prior to the end of the session.  Leslie demonstrated good  understanding of the education provided.     See Electronic Medical Record under Patient Instructions for exercises provided throughout therapy.  Assessment:   Patient is very motivated to improve and participates in all therapy tasks presented. She has met four goals since the initiation of therapy and continues to progress towards other goals. She reports improvements in her daily life. She continues to demonstrate difficulty with attention and short term memory. She will benefit from continued speech therapy to address the above deficits and to utilize strategies as needed.     Leslie is progressing well towards her goals.Current goals remain appropriate. Goals will be updated as needed.     Patient prognosis is Good. Patient will continue to benefit from skilled outpatient speech and language therapy to address the deficits listed in the problem list on initial evaluation, provide patient/family education and to maximize patient's level of independence in the home and community environment.     Medical necessity is demonstrated by the following IMPAIRMENTS:  Language deficits  impact overall quality of life, ability to participation in social and community interactions, and the ability to communicate important medical information if needed.   Cognitive-communication deficits impact overall quality of life and the ability to safely and independently complete activities of daily living.     Barriers to Therapy: none   Patient's spiritual, cultural and educational needs considered and patient agreeable to plan of care and goals.  Plan:   Continue Plan of Care with focus on sustained/selective attention for functional daily tasks.     ADWOA Burroughs  Student Clinician   6/2/2023      Becca Blanco CCC-SLP, CBIS   Speech Language Pathologist   Certified Brain Injury Specialist   6/2/2023

## 2023-06-05 ENCOUNTER — CLINICAL SUPPORT (OUTPATIENT)
Dept: REHABILITATION | Facility: HOSPITAL | Age: 83
End: 2023-06-05
Payer: MEDICARE

## 2023-06-05 DIAGNOSIS — R47.01 APHASIA: Primary | ICD-10-CM

## 2023-06-05 DIAGNOSIS — R41.841 COGNITIVE COMMUNICATION DISORDER: ICD-10-CM

## 2023-06-05 DIAGNOSIS — Z74.09 IMPAIRED FUNCTIONAL MOBILITY, BALANCE, GAIT, AND ENDURANCE: Primary | ICD-10-CM

## 2023-06-05 PROCEDURE — 97140 MANUAL THERAPY 1/> REGIONS: CPT

## 2023-06-05 PROCEDURE — 97129 THER IVNTJ 1ST 15 MIN: CPT | Mod: KX

## 2023-06-05 PROCEDURE — 97110 THERAPEUTIC EXERCISES: CPT

## 2023-06-05 PROCEDURE — 97130 THER IVNTJ EA ADDL 15 MIN: CPT | Mod: KX

## 2023-06-05 NOTE — PROGRESS NOTES
[facility]   Physical Therapy Treatment Note + UP[facility]    Name: Leslie CASTELLON [facility] Number: [id number]    Therapy Diagnosis:   Encounter Diagnosis   Name Primary?    Impaired functional mobility, balance, gait, and endurance Yes     Physician: Sepideh Peters,*    Visit Date: 6/5/2023    Physician Orders: PT Eval and Treat   Medical Diagnosis from Referral: R29.6 (ICD-10-CM) - Falls frequently  Evaluation Date: 2/27/2023  Authorization Period Expiration: 2/17/24  Plan of Care Expiration: 4/28/23  Progress Note Due: 5/6/23  Visit # / Visits authorized: 15/16 (+eval)  FOTO: 0/3 not appropriate due to poor insight and cognitive deficits     Precautions: Standard and Fall     PTA Visit #: 0/5     Time In: 0915  Time Out: 1015  Total Billable Time: 60 minutes    SUBJECTIVE     Pt reports: Patient reports that she does still experience a bit of pain at the lower back region.  She also reports of pain at the right knee.  She reports that she was not compliant with Home Exercise Program as she left it in her granddaughter's car.   Response to previous treatment: She responded well to last session.   Functional change: She reports no functional change since last session.     Pain: 4/10  Location: right knee and lower back    OBJECTIVE     Objective Measures updated at progress report unless specified.           Patient reports a score of 46/56 on the Encarnacion Balance Assessment on 4/27/23.              Treatment     Leslie received the treatments listed below:      THERAPEUTIC EXERCISES to develop strength, endurance, ROM, flexibility, posture, and core stabilization for (30) minutes including:    Intervention Performed Today    Supine Stretches   X  X  x - 3x30 seconds bilateral for each:  - lower trunk rotation   - single knee to chest  - hamstring/ heelcord   Seated Stretches  - 3x30 seconds bilateral for each:  - hamstring/ heelcord  - hip adductor   Standing Stretches  - 3x15 seconds bilateral  for each:  - IT band   - heelcord    Seated lower extremity exercises          - hip flexion 2x10 bilateral   - hamstring curl 2x10 bilateral   - dorsiflexion/ plantarflexion 2x10 bilateral with green band  - eversion 1x10 bilateral   - toe curls and flares 1x20 bilateral   - hip adduction against ball 1x10 with 3 second hold  - hip abduction against belt 1x10 with 3 second hold   Standing lower extremity exercises        x - hip flexion 2x10 bilateral   - hip abduction 2x10 bilateral   - hip extension 2x10 bilateral   - heel raise 2x10    NuStep for endurance x - Level 2, 6 minutes   Bicycle for endurance and ROM  - Level 1 x 6 minutes   Supine x - Quad set with short arc quad 2x10 with 3 second hold   - dorsiflexion against green band 2x10 bilateral    Lower Trunk Rotations x 10 second holds x 10   Pelvic tilts x 10 second holds x 10   Bridges  x 2x10   Abdominal Bracing x Knee to chest - 1x10 bilateral    Straight leg raise   - 1x10 bilateral    Neoprene wrap to Right knee for all exercises    neuromuscular re-education activities to improve: Balance, Coordination, Kinesthetic, Sense, Proprioception, and Posture for (0) minutes. The following activities were included:    Intervention Performed Today    Air Ex Beam        - Tandem holds 5 seconds, 3x's   - Tandem walking forward/backward 3x's each  - Side stepping 3x's each direction  - tandem walking over hurdles forward and side stepping 2x's each  - heel lifts and toe lifts off of beam with 2-0 hand supports   - single leg standing while tapping opposite foot forward and back on beam   Orange Rubber Beam    - Tandem holds 3 seconds, 3x's bilateral   - Tandem walking forward 4x's   - Side stepping 2x's each direction  - single leg standing while tapping opposite foot forward and back on beam   Air Ex Pad              - Weight shifts Left to Right 2x10  - Weight shifts forward/back 2x10  - single leg standing holds   - Eyes closed 10 seconds  - backward pivot  steps with 1 hand support 1x8 bilateral   - standing on pad while toss/catching ball to wall 1x8  - One lower extremity on pad and opposite lower extremity on step tossing ball to wall    Hand paddled per Neuro LEONIE technique and for entirety of session     Core Exercises in supine  Bilateral lower extremities over Tball for each:   - lower trunk rotation 2x10  - bilateral knees to chest 2x10  - posterior pelvic tilt to bridge 2x10   Parallel bars      - walking backward 4x8 ft with 2-1 hand support  - braided walking 4x's length of bars  - narrow base standing without hand support performing head turns/nods 1x5 each, arm reach/lifts 1x5 each   Trunk extension for posture  - 2x10 over pillow and ball at low back    Sit to squat without hand support  2x10 from progressively lowered mat   Step ups  6 inch step  - 2 sets of 1 minute on each leg   Functional Re-Tests  - see above   Encarnacion Balance Scale  - see above   Agility Drills on Turf  - side stepping 2x20 ft  - backward walking 2x20 ft - without shoes  - tandem walking 2x15 ft - without shoes       THERAPEUTIC ACTIVITIES to improve dynamic and functional performance for () minutes including:    Intervention Performed Today    Patient and her granddaughter educated on Home Exercise Program for balance.  Also educated on wearing tennis shoes to future sessions.  Discussed the process of changing schedule to get her seen by an ortho PT 1 day/week then by me 1day/week  - Patient and granddaughter verbalized good understanding of education provided.                                              MANUAL THERAPY TECHNIQUES were applied for (30) minutes, including:    Manual Intervention Performed Today    Soft Tissue Mobilization x Bilateral thoracolumbar spine   Joint Mobilizations [x] PIVM lumbar spine    []     []    Functional Dry Needling  [x] Bilateral thoracolumbar spine     Plan for Next Visit: Continue as needed         Gait Training: to improve functional mobility  and safety for  () minutes, including:  - working on increasing step length and upright posture with gait sequence, ambulating 200ft and 100ft with contact guard assist for safety awareness.     Patient Education and Home Exercises     Home Exercises Provided and Patient Education Provided     Education provided:   - 3/14/23 Patient educated on Home Exercise Program for balance   - 3/16/23 Patient encouraged to stay compliant with Home Exercise Program   - 4/27/23 discussed continuing with James PT for dry needling 1 day/week for the next 4 weeks.   Written Home Exercises Provided: yes. Exercises were reviewed and Leslie was able to demonstrate them prior to the end of the session.  Leslie verbalized good  understanding of the education provided. See EMR under Patient Instructions for exercises provided during therapy sessions    ASSESSMENT     Patient reports that she continues to experience knee and lower back pain.  She does report of decreased pain at the lower back with FDN.  There is still subluxation and inability to extend the knee when there is a subluxation.  Once the knee reduces, there is improved movement and better tolerance for weight bearing at the knee.  Still would like to find a facility for the patient to continue with an exercise program.    Leslie Is progressing well towards her goals.   Pt prognosis is Good.     Pt will continue to benefit from skilled outpatient physical therapy to address the deficits listed in the problem list box on initial evaluation, provide pt/family education and to maximize pt's level of independence in the home and community environment.     Pt's spiritual, cultural and educational needs considered and pt agreeable to plan of care and goals.     Anticipated barriers to physical therapy: co-morbidities, sedentary lifestyle, chronicity of condition, and lack of understanding of condition     GOALS:     Short Term Goals:  4 weeks Progress    Function: Patient will demonstrate  improved function as indicated by a functional limitation score improved by 3 points from initial measure.  PC   Strength: Patient will demonstrate improved strength by performing 5x sit to  20 seconds in order to progress towards independence with functional activities.  Met   Balance: Complete Encarnacion Balance Scale and set goals accordingly.  Met   Coordination: Patient will demonstrate improved time of 13 sec on TUG to demonstrate improved coordination and safety with mobility. PC   HEP: Patient will demonstrate independence with HEP in order to progress toward functional independence.  Met   Determine the need of Custom Wheelchair Eval to improve patient's positioning and independence with pressure reliefs and independence with home and community mobility to decrease burden of care. D/C not needed at this time.              Long Term Goals:  8 weeks Progress   Function: Patient will demonstrate improved function as indicated by a functional limitation score improved 5 points from initial measure.  PC   Strength: Patient will demonstrate improved strength by performing 5x sit to  17 seconds in order to progress towards independence with functional activities.  Met   Patient will return to ADL's, IADL's, community involvement, recreational activities, and work-related activities with less than or equal to 5/10 pain and maximal function.  PC   Balance: Improve Encarnacion Balance score to 50/56 to demonstrate improved balance and decreased risk of falls.  PC   Coordination: Patient will demonstrate improved time of 11 sec on TUG to demonstrate improved coordination and safety with mobility. PC   HEP: Patient educated on HEP in preparation for d/c verbalizing and demonstrating good understanding of topics discussed.   PC            Goals Key:  PC= progressing/continue;        PM= partially met;             DC= discontinue    PLAN     Continue Plan of Care (POC) and progress per patient tolerance. See Treatment  section for exercise progression.    Leoncio Lujan, PT

## 2023-06-05 NOTE — PROGRESS NOTES
[facility]  Speech Therapy Treatment Note- Neurological Rehabilitation    Date: 6/5/2023     Name: Leslie Wood   MRN: [id number]   Therapy Diagnosis:   Encounter Diagnoses   Name Primary?    Aphasia Yes    Cognitive communication disorder      Physician: Sammy Juarez MD  Physician Orders: [id number] - AMB REFERRAL/CONSULT TO SPEECH THERAPY  Medical Diagnosis: R47.01 (ICD-10-CM) - Aphasia    Visit #/ Visits Authorized 22/40  Date of Evaluation:  2/17/2023   Insurance Authorization Period: 2/23/2023 - 12/31/2023   Plan of Care Expiration Date: 6/26/2023   Extended Plan of Care:  NA   Progress Note: 6/19/2023  Visits Cancelled: 0  Visits No Show: 0    Time In:  10:05 AM   Time Out: 10:45 AM  TOTAL TIME: 40 minutes      Precautions: Standard  Subjective:   Patient reported to her session with her granddaughter. Patient seen with physical therapy.   She was compliant to home exercise program.     Response to previous treatment: continued progress towards     Pain Scale:  2/10 on a Visual Analog Scale currently.   Pain Location:  right knee/back  Objective:   TIMED  Procedure Min.   Cognitive Therapeutic Interventions, first 15 minutes CPT 75001  15   Cognitive Therapeutic Interventions, each additional 15 minutes CPT 22257  25         UNTIMED  Procedure Min.   Speech- Language- Voice Therapy  0     Total Timed Units: 3  Total Untimed Units: 0  Charges Billed/Number of units: 27042/1; 36332/2    Short Term Goals: (6 weeks) Current Progress:   Patient will describe functional objects/images to facilitate a strategy of description in order to increase transfer of intended message with 80% accuracy and moderate clinician cueing.     Progressing/ Not Met 6/5/2023   Not formally addressed.     Patient will complete word finding task (i.e. Creating subject, verb, object pairs in VNEST protocol) with 90% accuracy moderate assistance to improve word fluency.       GOAL MET 5/22/2023 Completed today X4. Patient able to  create simple sentences with 95% accuracy independently. She required additional cueing to expand sentences with when, where, or why. She was able to expand sentences with 90% accuracy given minimal-moderate cueing.     GOAL MET 5/22/2023   Patient will utilize word finding strategies in structured tasks with 70% accuracy and minimal cues.      Progressing/ Not Met 6/5/2023   Not formally addressed.    Patient will summarize read material to improve word fluency, word finding, and reading comprehension in Attentive Reading and Constrained Summarization (ARCS) protocol with 80% accuracy and minimal verbal assistance across 2-3 sessions.     Progressing/ Not Met 6/5/2023   Not formally addressed.      Patient will write 7-10 word sentences with no more than 2 syntactic/grammatical errors given minimal cueing.      Progressing/ Not Met 6/5/2023   Not formally addressed.      Patient will complete functional writing task with complete, coherent, and accurately spelled responses with 80% accuracy and minimal verbal and visual assistance across 3-5 sessions.     Progressing/ Not Met 6/5/2023   Not formally addressed.      Patient will complete RBANS assessment to further assess cognitive communication skills.        GOAL MET 2/27/2023  Completed today. See below for results.       GOAL MET 2/27/2023    Patient will sustain attention to a moderate task (auditory or visual) for 2 minutes with 90% accuracy or no more than 1 redirection.     Visual: GOAL MET 3/20/2023  Auditory: GOAL MET 3/20/2023  VISUAL:   Task 1: Visual sustained attention task completed in which patient visually scanned an article looking for certain letters. She completed this task with 87% accuracy independently (36/41). Clinician declan a purple line on the left hand side to serve an anchor. Patient utilized a blank paper to keep track of which line she was working on.     Task 2: Visual sustained attention task completed in which patient visually  scanned a phone bill looking for unexplained calls. She completed this task with 90% accuracy given minimal cueing. The one error that she made was due to skipping over an area code that was more offset to the left than the others. Clinician and patient dicussed left neglect in depth that is likely due to her first cerebral vascular accident (right cerebral vascular accident).     AUDITORY:  Task 1: Constant Therapy: Understanding Voicemails was completed today with 90% accuracy independently (improvement from last session with 70% accuracy). She required minimal assistance for navigating the application (pressing the next button, replaying the voicemail)    GOAL MET 3/20/2023    Patient will complete selective attention tasks (auditory or visual) with 90% accuracy independently increase selective attention.    Visual: GOAL MET 4/6/2023  Auditory: GOAL MET 5/12/2023  Visual:   Medication management task. Patient was asked to sort three medications into a weekly pill box using the instructions provided on each pill bottle. She completed this task with 92% accuracy (correctly sorted 26 out of 28 individual pills) independently. Clinician and patient discussed the two missed pills and potential negative consequences of missing medication.     Auditory:  Patient completed auditory selective attention task of immediate recall of short stories (3 sentences). Initially she recalled stories with 50% accuracy. Clinician and patient discussed strategies for improved accuracy such as repeating the story and using visualization. Using these strategies, she then answered questions with 90% accuracy.    Patient will use attention shifting strategies to shift attention between two tasks (auditory or visual) with no more than 3 cues or 90% accuracy to improve alternating attention. Visual:   Patient completed visual alternating attention task of using a written list of words to connect pictures to each other in the correct order  with 80% accuracy given consistent moderate cues.     Auditory:   Patient alternated between various categories aloud with cues for use of strategies such as visualization, chunking, alphabetical organization, etc.   Trial 1: She completed this task with 70% accuracy requiring cueing for recalling the categories and recalling which items she had already listed.     Patient completed an auditory attention task of listening to the clinician say numbers and raising her hand when she identified numbers that started with five with 70% accuracy given moderate cues.      Patient will complete short term recall tasks after a 5 minutes delay with 90% accuracy  independently  with use of memory strategies to improve recall of information and generalization of memory strategies. Not formally addressed.    Patient will use Goal Plan Action Review strategy to complete moderate to complex reasoning, planning, or organization tasks with 90% accuracy independently to improve functional executive function skills. Not formally addressed.    Patient will complete a functional task to improve attention, memory and/or executive functions (I.e. sample bill paying activity, recipe, or multiple choice comprehension questions to 1 paragraphs) with 80% accuracy and natural environment noise distractions (TV news background, music, etc.). Not formally addressed.    Patient will be educated regarding cognitive deficits as applicable to the patient's life for increased awareness and will execute returned demonstration of information with 80% accuracy.           GOAL MET 3/6/2023  Education provided regarding results from her assessment, the hierarchy of neuropsychological functions, and the role of attention in other executive functions. Patient and granddaughter verbalized understanding.     GOAL MET 3/6/2023    Patient will independently generate strategies to improve ability to complete tasks with enhanced accuracy and time, based on review  of objective previous performance on trials of functional tasks with 80% accuracy.  Not formally addressed.    Given implementation of strategies, patient will complete a task following initial attempt with 90% accuracy and reported reduced number on the relative scale of cognitive load when compared to previous trial.  Not formally addressed.    Patient will use external memory strategies in order to recall important dates, events, appointments, or tasks to be completed with 90% accuracy independently.  Not formally addressed.        Patient Education/Response:   Education provided on progress thus far. Patient verbalized understanding of all discussed.     Home program established: Continue prior program.   Exercises were reviewed and Leslie was able to demonstrate them prior to the end of the session.  Leslie demonstrated good  understanding of the education provided.     See Electronic Medical Record under Patient Instructions for exercises provided throughout therapy.  Assessment:   Patient is very motivated to improve and participates in all therapy tasks presented. She has met four goals since the initiation of therapy and continues to progress towards other goals. She reports improvements in her daily life. She continues to demonstrate difficulty with attention and short term memory. She will benefit from continued speech therapy to address the above deficits and to utilize strategies as needed. It is noted the session took place with physical therapy which may have affected accuracy on auditory attention task as patient was distracted with dry needling.     Leslie is progressing well towards her goals. Current goals remain appropriate. Goals will be updated as needed.     Patient prognosis is Good. Patient will continue to benefit from skilled outpatient speech and language therapy to address the deficits listed in the problem list on initial evaluation, provide patient/family education and to maximize patient's  level of independence in the home and community environment.     Medical necessity is demonstrated by the following IMPAIRMENTS:  Language deficits impact overall quality of life, ability to participation in social and community interactions, and the ability to communicate important medical information if needed.   Cognitive-communication deficits impact overall quality of life and the ability to safely and independently complete activities of daily living.     Barriers to Therapy: none   Patient's spiritual, cultural and educational needs considered and patient agreeable to plan of care and goals.  Plan:   Continue Plan of Care with focus on sustained/selective attention for functional daily tasks.     ADWOA Burroughs  Student Clinician   6/5/2023      Becca Blanco CCC-SLP, CBIS   Speech Language Pathologist   Certified Brain Injury Specialist   6/5/2023

## 2023-06-09 ENCOUNTER — CLINICAL SUPPORT (OUTPATIENT)
Dept: SPEECH THERAPY | Facility: HOSPITAL | Age: 83
End: 2023-06-09
Payer: MEDICARE

## 2023-06-09 DIAGNOSIS — R47.01 APHASIA: Primary | ICD-10-CM

## 2023-06-09 DIAGNOSIS — R41.841 COGNITIVE COMMUNICATION DISORDER: ICD-10-CM

## 2023-06-09 PROCEDURE — 97129 THER IVNTJ 1ST 15 MIN: CPT

## 2023-06-09 PROCEDURE — 97130 THER IVNTJ EA ADDL 15 MIN: CPT

## 2023-06-09 NOTE — PROGRESS NOTES
[facility]  Speech Therapy Treatment Note- Neurological Rehabilitation    Date: 6/9/2023     Name: Leslie Wood   MRN: [id number]   Therapy Diagnosis:   Encounter Diagnoses   Name Primary?    Aphasia Yes    Cognitive communication disorder      Physician: Sammy Juarez MD  Physician Orders: [id number] - AMB REFERRAL/CONSULT TO SPEECH THERAPY  Medical Diagnosis: R47.01 (ICD-10-CM) - Aphasia    Visit #/ Visits Authorized 23/40  Date of Evaluation:  2/17/2023   Insurance Authorization Period: 2/23/2023 - 12/31/2023   Plan of Care Expiration Date: 6/26/2023   Extended Plan of Care:  NA   Progress Note: 6/19/2023  Visits Cancelled: 0  Visits No Show: 0    Time In:  8:45 AM   Time Out: 9:30 AM  TOTAL TIME: 45 minutes      Precautions: Standard  Subjective:   Patient reported to her session with her granddaughter. She was compliant to home exercise program.     Response to previous treatment: continued progress towards     Pain Scale:  2/10 on a Visual Analog Scale currently.   Pain Location:  right knee/back  Objective:   TIMED  Procedure Min.   Cognitive Therapeutic Interventions, first 15 minutes CPT 29084  15   Cognitive Therapeutic Interventions, each additional 15 minutes CPT 34361  30         UNTIMED  Procedure Min.   Speech- Language- Voice Therapy  0     Total Timed Units: 3  Total Untimed Units: 0  Charges Billed/Number of units: 09443/1; 12018/2    Short Term Goals: (6 weeks) Current Progress:   Patient will describe functional objects/images to facilitate a strategy of description in order to increase transfer of intended message with 80% accuracy and moderate clinician cueing.     Progressing/ Not Met 6/9/2023   Not formally addressed.     Patient will complete word finding task (i.e. Creating subject, verb, object pairs in VNEST protocol) with 90% accuracy moderate assistance to improve word fluency.       GOAL MET 5/22/2023 Completed today X4. Patient able to create simple sentences with 95%  accuracy independently. She required additional cueing to expand sentences with when, where, or why. She was able to expand sentences with 90% accuracy given minimal-moderate cueing.     GOAL MET 5/22/2023   Patient will utilize word finding strategies in structured tasks with 70% accuracy and minimal cues.      Progressing/ Not Met 6/9/2023   Not formally addressed.    Patient will summarize read material to improve word fluency, word finding, and reading comprehension in Attentive Reading and Constrained Summarization (ARCS) protocol with 80% accuracy and minimal verbal assistance across 2-3 sessions.     Progressing/ Not Met 6/9/2023   Not formally addressed.      Patient will write 7-10 word sentences with no more than 2 syntactic/grammatical errors given minimal cueing.      Progressing/ Not Met 6/9/2023   Not formally addressed.      Patient will complete functional writing task with complete, coherent, and accurately spelled responses with 80% accuracy and minimal verbal and visual assistance across 3-5 sessions.     Progressing/ Not Met 6/9/2023   Not formally addressed.      Patient will complete RBANS assessment to further assess cognitive communication skills.        GOAL MET 2/27/2023  Completed today. See below for results.       GOAL MET 2/27/2023    Patient will sustain attention to a moderate task (auditory or visual) for 2 minutes with 90% accuracy or no more than 1 redirection.     Visual: GOAL MET 3/20/2023  Auditory: GOAL MET 3/20/2023  VISUAL:   Task 1: Visual sustained attention task completed in which patient visually scanned an article looking for certain letters. She completed this task with 87% accuracy independently (36/41). Clinician declan a purple line on the left hand side to serve an anchor. Patient utilized a blank paper to keep track of which line she was working on.     Task 2: Visual sustained attention task completed in which patient visually scanned a phone bill looking for  unexplained calls. She completed this task with 90% accuracy given minimal cueing. The one error that she made was due to skipping over an area code that was more offset to the left than the others. Clinician and patient dicussed left neglect in depth that is likely due to her first cerebral vascular accident (right cerebral vascular accident).     AUDITORY:  Task 1: Constant Therapy: Understanding Voicemails was completed today with 90% accuracy independently (improvement from last session with 70% accuracy). She required minimal assistance for navigating the application (pressing the next button, replaying the voicemail)    GOAL MET 3/20/2023    Patient will complete selective attention tasks (auditory or visual) with 90% accuracy independently increase selective attention.    Visual: GOAL MET 4/6/2023  Auditory: GOAL MET 5/12/2023  Visual:   Medication management task. Patient was asked to sort three medications into a weekly pill box using the instructions provided on each pill bottle. She completed this task with 92% accuracy (correctly sorted 26 out of 28 individual pills) independently. Clinician and patient discussed the two missed pills and potential negative consequences of missing medication.     Auditory:  Patient completed auditory selective attention task of immediate recall of short stories (3 sentences). Initially she recalled stories with 50% accuracy. Clinician and patient discussed strategies for improved accuracy such as repeating the story and using visualization. Using these strategies, she then answered questions with 90% accuracy.    Patient will use attention shifting strategies to shift attention between two tasks (auditory or visual) with no more than 3 cues or 90% accuracy to improve alternating attention. Visual:   Patient completed visual alternating attention task of using a written list of words to connect pictures to each other in the correct order with 90% accuracy given consistent  moderate cues.     Patient completed visual alternating attention task of using visuals to connect pictures to each other in the correct order with 80% accuracy given consistent moderate cues.     Auditory:   Patient completed auditory attention task of immediate recall of short stories (task one) and listening to the clinician say numbers and raising her hand when she identified numbers that started with five (task two). She completed task one with 100% accuracy independently. Patient completed task two with 60% accuracy given consistent moderate cueing.      Patient will complete short term recall tasks after a 5 minutes delay with 90% accuracy  independently  with use of memory strategies to improve recall of information and generalization of memory strategies. Not formally addressed.    Patient will use Goal Plan Action Review strategy to complete moderate to complex reasoning, planning, or organization tasks with 90% accuracy independently to improve functional executive function skills. Not formally addressed.    Patient will complete a functional task to improve attention, memory and/or executive functions (I.e. sample bill paying activity, recipe, or multiple choice comprehension questions to 1 paragraphs) with 80% accuracy and natural environment noise distractions (TV news background, music, etc.). Not formally addressed.    Patient will be educated regarding cognitive deficits as applicable to the patient's life for increased awareness and will execute returned demonstration of information with 80% accuracy.           GOAL MET 3/6/2023  Education provided regarding results from her assessment, the hierarchy of neuropsychological functions, and the role of attention in other executive functions. Patient and granddaughter verbalized understanding.     GOAL MET 3/6/2023    Patient will independently generate strategies to improve ability to complete tasks with enhanced accuracy and time, based on review of  objective previous performance on trials of functional tasks with 80% accuracy.  Not formally addressed.    Given implementation of strategies, patient will complete a task following initial attempt with 90% accuracy and reported reduced number on the relative scale of cognitive load when compared to previous trial.  Not formally addressed.    Patient will use external memory strategies in order to recall important dates, events, appointments, or tasks to be completed with 90% accuracy independently.  Not formally addressed.        Patient Education/Response:   Education provided on progress thus far. Patient verbalized understanding of all discussed.     Home program established: Continue prior program.   Exercises were reviewed and Leslie was able to demonstrate them prior to the end of the session.  Leslie demonstrated good  understanding of the education provided.     See Electronic Medical Record under Patient Instructions for exercises provided throughout therapy.  Assessment:   Patient is very motivated to improve and participates in all therapy tasks presented. She has met four goals since the initiation of therapy and continues to progress towards other goals. She reports improvements in her daily life. She continues to demonstrate difficulty with attention and short term memory. She will benefit from continued speech therapy to address the above deficits and to utilize strategies as needed. Discussed discharge at the end of the month and patient verbalized understanding and agreement.     Leslie is progressing well towards her goals. Current goals remain appropriate. Goals will be updated as needed.     Patient prognosis is Good. Patient will continue to benefit from skilled outpatient speech and language therapy to address the deficits listed in the problem list on initial evaluation, provide patient/family education and to maximize patient's level of independence in the home and community environment.      Medical necessity is demonstrated by the following IMPAIRMENTS:  Language deficits impact overall quality of life, ability to participation in social and community interactions, and the ability to communicate important medical information if needed.   Cognitive-communication deficits impact overall quality of life and the ability to safely and independently complete activities of daily living.     Barriers to Therapy: none   Patient's spiritual, cultural and educational needs considered and patient agreeable to plan of care and goals.  Plan:   Continue Plan of Care with focus on sustained/selective attention for functional daily tasks.     ADWOA Burroughs  Student Clinician   6/9/2023      Becca Blanco, CCC-SLP, CBIS   Speech Language Pathologist   Certified Brain Injury Specialist   6/9/2023

## 2023-06-12 ENCOUNTER — CLINICAL SUPPORT (OUTPATIENT)
Dept: REHABILITATION | Facility: HOSPITAL | Age: 83
End: 2023-06-12
Payer: MEDICARE

## 2023-06-12 DIAGNOSIS — M62.81 DECREASED MUSCLE STRENGTH: ICD-10-CM

## 2023-06-12 DIAGNOSIS — Z78.9 DECREASED ACTIVITIES OF DAILY LIVING (ADL): ICD-10-CM

## 2023-06-12 DIAGNOSIS — R47.01 APHASIA: Primary | ICD-10-CM

## 2023-06-12 DIAGNOSIS — R29.898 FINE MOTOR IMPAIRMENT: ICD-10-CM

## 2023-06-12 DIAGNOSIS — R29.898 DECREASED GRIP STRENGTH OF LEFT HAND: ICD-10-CM

## 2023-06-12 DIAGNOSIS — R29.818 FINE MOTOR IMPAIRMENT: ICD-10-CM

## 2023-06-12 DIAGNOSIS — M25.60 RANGE OF MOTION DEFICIT: Primary | ICD-10-CM

## 2023-06-12 DIAGNOSIS — R41.841 COGNITIVE COMMUNICATION DISORDER: ICD-10-CM

## 2023-06-12 PROCEDURE — 97530 THERAPEUTIC ACTIVITIES: CPT

## 2023-06-12 PROCEDURE — 97110 THERAPEUTIC EXERCISES: CPT

## 2023-06-12 PROCEDURE — 97130 THER IVNTJ EA ADDL 15 MIN: CPT

## 2023-06-12 PROCEDURE — 97129 THER IVNTJ 1ST 15 MIN: CPT

## 2023-06-12 PROCEDURE — 97112 NEUROMUSCULAR REEDUCATION: CPT

## 2023-06-12 NOTE — PROGRESS NOTES
[facility]  Occupational Therapy Treatment Note     Date: 6/12/2023  Name: Leslie CASTELLON [facility] Number: [id number]    Therapy Diagnosis:   Encounter Diagnoses   Name Primary?    Range of motion deficit Yes    Decreased  strength of left hand     Decreased muscle strength     Decreased activities of daily living (ADL)     Fine motor impairment      Physician: Angeles Carson MD    Physician Orders: Occupational Therapy to Evaluate and Treat   Medical Diagnosis: R29.898 (ICD-10-CM) - Poor fine motor skills  Surgical Procedure and Date: N/A     Evaluation Date: 5/17/2023  Insurance Authorization Period Expiration: 6/5/2023 - 12/31/2023  Plan of Care Certification Period: 5/17/2023 - 10/17/2023  Progress Note Due: 6/17/2023      Date of Return to MD: N/A     Visit # / Visits authorized: 1/25 (2 Episode Visits)   FOTO: 1/3     Precautions:  Standard    Time In: 3:45  Time Out: 4:30  Total Treatment Time: 45 minutes  Total Billable Time: 45 minutes    Subjective     Patient reports: She is doing well she has been doing her HEP    she was compliant with home exercise program given last session.   Response to previous treatment: Tolerated well  Functional change: Improved knowledge of HEP    Pain: 0/10  Location: bilateral hands     Objective     Objective measures updated at progress report unless specified.    Treatment     Supervised Modalities: Modalities such as hot/cold packs, traction, unattended electrical stimulation, paraffin bath, fluidotherapy to reduce pain and increase soft tissue extensibility. Leslie received (0) minutes of modalities listed below after being cleared for contraindications.  x = modalities performed     Supervised Modalities 6/12/2023                            Manual Therapy Techniques: Joint mobilizations, Soft tissue Mobilization, and mobilization with movement applied to the area listed below. Leslie received (0) minutes of interventions. x = intervention performed  today    Manual Intervention 6/12/2023    Soft Tissue Mobilization     Joint Mobilizations     Mobilization with movement              Therapeutic Exercises: to develop strength, endurance, ROM, flexibility, posture and core stabilization. Leslie received (15) minutes of exercises listed below. x = performed today * = level of progression of activity     Therapeutic Exercise 6/12/2023    Interossei  -green foam  -rubber bands x 20x bilateral    Digi flexion - yellow x 20x bilateral    Resistive pinch - yellow  -2pt  -3pt  -lateral x 20x bilateral    Thumb palmar   -adduction  -abduction x 20x bilateral    Thumb radial  -adduction  -abduction x 20x bilateral         Therapeutic Activities: Everyday tasks to address the combined need of improved strength, range of motion, and endurance to achieve increased independence. While simulating these activities, the person is applying the gained skills of strength and improved range of motion in a practical way.  Leslie received (15) minutes of activities listed below. x = activities performed today * = level of progression of activity     Therapeutic Activities 6/12/2023    Velcro board  -lateral pinch  - dowel  x 20x bilateral    Opening and closing containers x 5 minutes, ergonomics               Neuromuscular Re-education: the use of functional strengthening, stretching, balancing and coordination activities that train the nerves and muscles to react and communicate to restore normal body movement patterns to increase self care independence. Leslie received (15) minutes of interventions listed below.  x = interventions performed today * = level of progression of activity     Neuromuscular Re-education 6/12/2023    Finger opposition  x 20x   Finger taps (extension) x 20x   Finger opposition with slides            Self Care: Fundamental skills required to independently care for oneself. Activities of daily living such as eating, bathing, dressing, toileting, grooming,  sleeping, transfers and mobility. Instrumental activities of daily living such as driving, medication management, pet care, home management/cleaning, financial management, using the phone, meal preparation and shopping. Leslie received (0) minutes of interventions listed below.  x = interventions performed * = level of progression of activity     Self Care 6/12/2023                            Home Exercises and Education Provided     Education provided:   - home exercise program education provided  - progress towards goals     Written Home Exercises Provided: Patient instructed to cont prior HEP.  Exercises were reviewed and Leslie was able to demonstrate them prior to the end of the session. Leslie demonstrated good understanding of the home exercise program provided.     See EMR under Patient Instructions for exercises provided  5/17/2023 .        Assessment     Patient tolerated exercises well. Needs verbal cues for hand placement.     Leslie is progressing towards her goals and there are no updates to goals at this time. Patient prognosis is Good.     Patient will continue to benefit from skilled outpatient occupational therapy to address the deficits listed in the problem list on initial evaluation provide patient/family education and to maximize patient's level of independence in the home and community environment.     Patient's spiritual, cultural and educational needs considered and patient agreeable to plan of care and goals.    Anticipated barriers to occupational therapy: Cognition, home exercise program compliance    Goals:     Short Term Goals:  6 weeks  Progress    Pain: Patient will demonstrate improved pain by reports of less than or equal to 7/10 worst pain on the verbal rating scale in order to progress toward maximal functional ability and improve quality of life. PC   Function: Patient will demonstrate improved function as indicated by a functional limitation score of less than or equal to 36 out of 100  on FOTO. PC   Mobility: Patient will improve active range of motion to 50% of stated goals, listed in objective measures above, in order to progress towards independence with functional activities.  PC   Strength: Patient will improve strength to 50% of stated goals, listed in objective measures above, in order to progress towards independence with functional activities.  PC   HEP: Patient will demonstrate independence with home exercise program in order to progress toward functional independence. PC      Long Term Goals:  12 weeks  Progress   Pain: Patient will demonstrate improved pain by reports of less than or equal to 6/10 worst pain on the verbal rating scale in order to progress toward maximal functional ability and improve quality of life.   PC   Function: Patient will demonstrate improved function as indicated by a functional limitation score of less than or equal to 36 out of 100 on FOTO. PC   Mobility: Patient will improve active range of motion to stated goals, listed in objective measures above, in order to return to maximal functional potential and improve quality of life. PC   Strength: Patient will improve strength to stated goals, listed in objective measures above, in order to improve functional independence and quality of life. PC   Patient will return to normal ADL's, IADL's, community involvement, recreational activities, and work-related activities with less than or equal to 5/10 pain and maximal function.  PC      Goals Key:  PC= Progressing/Continue;       PM= Partially Met;       GM= Goal Met,      DC= Discontinue    Plan     Continue Plan of Care (POC) and progress per patient tolerance.      Ivana Gallo, OT  ,OTD, OTR/L

## 2023-06-14 LAB
OHS CV HOLTER SINUS AVERAGE HR: 73
OHS CV HOLTER SINUS MAX HR: 134
OHS CV HOLTER SINUS MIN HR: 55

## 2023-06-16 ENCOUNTER — CLINICAL SUPPORT (OUTPATIENT)
Dept: SPEECH THERAPY | Facility: HOSPITAL | Age: 83
End: 2023-06-16
Payer: MEDICARE

## 2023-06-16 DIAGNOSIS — R47.01 APHASIA: Primary | ICD-10-CM

## 2023-06-16 DIAGNOSIS — R41.841 COGNITIVE COMMUNICATION DISORDER: ICD-10-CM

## 2023-06-16 PROCEDURE — 97130 THER IVNTJ EA ADDL 15 MIN: CPT

## 2023-06-16 PROCEDURE — 97129 THER IVNTJ 1ST 15 MIN: CPT

## 2023-06-19 ENCOUNTER — CLINICAL SUPPORT (OUTPATIENT)
Dept: REHABILITATION | Facility: HOSPITAL | Age: 83
End: 2023-06-19
Payer: MEDICARE

## 2023-06-19 DIAGNOSIS — R29.898 DECREASED GRIP STRENGTH OF LEFT HAND: ICD-10-CM

## 2023-06-19 DIAGNOSIS — Z74.09 IMPAIRED FUNCTIONAL MOBILITY, BALANCE, GAIT, AND ENDURANCE: Primary | ICD-10-CM

## 2023-06-19 DIAGNOSIS — M25.60 RANGE OF MOTION DEFICIT: Primary | ICD-10-CM

## 2023-06-19 DIAGNOSIS — M62.81 DECREASED MUSCLE STRENGTH: ICD-10-CM

## 2023-06-19 DIAGNOSIS — Z78.9 DECREASED ACTIVITIES OF DAILY LIVING (ADL): ICD-10-CM

## 2023-06-19 DIAGNOSIS — R29.818 FINE MOTOR IMPAIRMENT: ICD-10-CM

## 2023-06-19 DIAGNOSIS — R29.898 FINE MOTOR IMPAIRMENT: ICD-10-CM

## 2023-06-19 PROCEDURE — 97112 NEUROMUSCULAR REEDUCATION: CPT

## 2023-06-19 PROCEDURE — 97110 THERAPEUTIC EXERCISES: CPT

## 2023-06-19 PROCEDURE — 97530 THERAPEUTIC ACTIVITIES: CPT

## 2023-06-19 NOTE — PROGRESS NOTES
[facility]  Speech Therapy Treatment Note/Discharge Note - Neurological Rehabilitation    Date: 6/16/2023     Name: Leslie Wood   MRN: [id number]   Therapy Diagnosis:   Encounter Diagnoses   Name Primary?    Aphasia Yes    Cognitive communication disorder        Physician: Sammy Juarez MD  Physician Orders: [id number] - AMB REFERRAL/CONSULT TO SPEECH THERAPY  Medical Diagnosis: R47.01 (ICD-10-CM) - Aphasia    Visit #/ Visits Authorized 25/40  Date of Evaluation:  2/17/2023   Insurance Authorization Period: 2/23/2023 - 12/31/2023   Plan of Care Expiration Date: 6/26/2023   Extended Plan of Care:  NA   Progress Note: 6/19/2023  Visits Cancelled: 0  Visits No Show: 0    Time In:  10:15 AM   Time Out: 11:00 AM  TOTAL TIME: 45 minutes      Precautions: Standard  Subjective:   Patient reported to her session with her granddaughter. She was compliant to home exercise program.     Response to previous treatment: continued progress towards goals    Pain Scale:  2/10 on a Visual Analog Scale currently.   Pain Location:  right knee/back  Objective:   TIMED  Procedure Min.   Cognitive Therapeutic Interventions, first 15 minutes CPT 75066  15   Cognitive Therapeutic Interventions, each additional 15 minutes CPT 46607  30         UNTIMED  Procedure Min.   Speech- Language- Voice Therapy  0     Total Timed Units: 3  Total Untimed Units: 0  Charges Billed/Number of units: 68295/1; 07163/2    Short Term Goals: (6 weeks) Current Progress:   Patient will describe functional objects/images to facilitate a strategy of description in order to increase transfer of intended message with 80% accuracy and moderate clinician cueing.     Progressing/ Not Met 6/16/2023   Discontinued.    Patient will complete word finding task (i.e. Creating subject, verb, object pairs in VNEST protocol) with 90% accuracy moderate assistance to improve word fluency.       GOAL MET 5/22/2023 Completed today X4. Patient able to create simple sentences  with 95% accuracy independently. She required additional cueing to expand sentences with when, where, or why. She was able to expand sentences with 90% accuracy given minimal-moderate cueing.     GOAL MET 5/22/2023   Patient will utilize word finding strategies in structured tasks with 70% accuracy and minimal cues.      Progressing/ Not Met 6/16/2023   Discontinued.    Patient will summarize read material to improve word fluency, word finding, and reading comprehension in Attentive Reading and Constrained Summarization (ARCS) protocol with 80% accuracy and minimal verbal assistance across 2-3 sessions.     Progressing/ Not Met 6/16/2023   Discontinued.    Patient will write 7-10 word sentences with no more than 2 syntactic/grammatical errors given minimal cueing.      Progressing/ Not Met 6/16/2023   Discontinued.    Patient will complete functional writing task with complete, coherent, and accurately spelled responses with 80% accuracy and minimal verbal and visual assistance across 3-5 sessions.     Progressing/ Not Met 6/16/2023   Discontinued.    Patient will complete RBANS assessment to further assess cognitive communication skills.        GOAL MET 2/27/2023  Completed today. See below for results.       GOAL MET 2/27/2023    Patient will sustain attention to a moderate task (auditory or visual) for 2 minutes with 90% accuracy or no more than 1 redirection.     Visual: GOAL MET 3/20/2023  Auditory: GOAL MET 3/20/2023  VISUAL:   Task 1: Visual sustained attention task completed in which patient visually scanned an article looking for certain letters. She completed this task with 87% accuracy independently (36/41). Clinician declan a purple line on the left hand side to serve an anchor. Patient utilized a blank paper to keep track of which line she was working on.     Task 2: Visual sustained attention task completed in which patient visually scanned a phone bill looking for unexplained calls. She completed this  task with 90% accuracy given minimal cueing. The one error that she made was due to skipping over an area code that was more offset to the left than the others. Clinician and patient dicussed left neglect in depth that is likely due to her first cerebral vascular accident (right cerebral vascular accident).     AUDITORY:  Task 1: Constant Therapy: Understanding Voicemails was completed today with 90% accuracy independently (improvement from last session with 70% accuracy). She required minimal assistance for navigating the application (pressing the next button, replaying the voicemail)    GOAL MET 3/20/2023    Patient will complete selective attention tasks (auditory or visual) with 90% accuracy independently increase selective attention.    Visual: GOAL MET 4/6/2023  Auditory: GOAL MET 5/12/2023  Visual:   Medication management task. Patient was asked to sort three medications into a weekly pill box using the instructions provided on each pill bottle. She completed this task with 92% accuracy (correctly sorted 26 out of 28 individual pills) independently. Clinician and patient discussed the two missed pills and potential negative consequences of missing medication.     Auditory:  Patient completed auditory selective attention task of immediate recall of short stories (3 sentences). Initially she recalled stories with 50% accuracy. Clinician and patient discussed strategies for improved accuracy such as repeating the story and using visualization. Using these strategies, she then answered questions with 90% accuracy.    Patient will use attention shifting strategies to shift attention between two tasks (auditory or visual) with no more than 3 cues or 90% accuracy to improve alternating attention. Visual:   Patient completed visual alternating attention task of connecting colored dots together following a corresponding written list of words with 90% accuracy independently.          Patient will complete short term  recall tasks after a 5 minutes delay with 90% accuracy  independently  with use of memory strategies to improve recall of information and generalization of memory strategies. Discontinued.    Patient will use Goal Plan Action Review strategy to complete moderate to complex reasoning, planning, or organization tasks with 90% accuracy independently to improve functional executive function skills. Discontinued.    Patient will complete a functional task to improve attention, memory and/or executive functions (I.e. sample bill paying activity, recipe, or multiple choice comprehension questions to 1 paragraphs) with 80% accuracy and natural environment noise distractions (TV news background, music, etc.). Discontinued.    Patient will be educated regarding cognitive deficits as applicable to the patient's life for increased awareness and will execute returned demonstration of information with 80% accuracy.           GOAL MET 3/6/2023  Education provided regarding results from her assessment, the hierarchy of neuropsychological functions, and the role of attention in other executive functions. Patient and granddaughter verbalized understanding.     GOAL MET 3/6/2023    Patient will independently generate strategies to improve ability to complete tasks with enhanced accuracy and time, based on review of objective previous performance on trials of functional tasks with 80% accuracy.  Discontinued.    Given implementation of strategies, patient will complete a task following initial attempt with 90% accuracy and reported reduced number on the relative scale of cognitive load when compared to previous trial.  Discontinued.    Patient will use external memory strategies in order to recall important dates, events, appointments, or tasks to be completed with 90% accuracy independently.  Discontinued.        Patient Education/Response:   Education provided on progress thus far as well as discharge from . Patient verbalized  understanding of all discussed.     Home program established: Continue prior program.   Exercises were reviewed and Leslie was able to demonstrate them prior to the end of the session.  Leslie demonstrated good  understanding of the education provided.     See Electronic Medical Record under Patient Instructions for exercises provided throughout therapy.  Assessment:   Patient was very motivated to improve and participated in all therapy tasks presented in all therapy sessions. Patient has progressed well towards her goals. She has reported improved in memory, attention, and everyday life skills. Discussed discharge during session, with continued use of HEP. Patient and granddaughter were receptive to education provided and plan for discharge from speech therapy.      Patient prognosis is Good.    Medical necessity is demonstrated by the following IMPAIRMENTS:  Language deficits impact overall quality of life, ability to participation in social and community interactions, and the ability to communicate important medical information if needed.   Cognitive-communication deficits impact overall quality of life and the ability to safely and independently complete activities of daily living.     Barriers to Therapy: none   Patient's spiritual, cultural and educational needs considered and patient agreeable to plan of care and goals.  Plan:   Patient is discharged from .   Patient has reached the maximum rehab potential at this time.     ADWOA Burroughs  Student Clinician   6/19/2023      Becca Blanco, CCC-SLP, CBIS   Speech Language Pathologist   Certified Brain Injury Specialist   6/19/2023

## 2023-06-19 NOTE — PROGRESS NOTES
[facility]  Occupational Therapy Treatment Note     Date: 6/19/2023  Name: Leslie CASTELLON [facility] Number: [id number]    Therapy Diagnosis:   Encounter Diagnoses   Name Primary?    Range of motion deficit Yes    Decreased  strength of left hand     Decreased muscle strength     Decreased activities of daily living (ADL)     Fine motor impairment      Physician: Angeles Carson MD    Physician Orders: Occupational Therapy to Evaluate and Treat   Medical Diagnosis: R29.898 (ICD-10-CM) - Poor fine motor skills  Surgical Procedure and Date: N/A     Evaluation Date: 5/17/2023  Insurance Authorization Period Expiration: 6/5/2023 - 12/31/2023  Plan of Care Certification Period: 5/17/2023 - 10/17/2023  Progress Note Due: 6/17/2023      Date of Return to MD: N/A     Visit # / Visits authorized: 2/25 (3 Episode Visits)   FOTO: 1/3     Precautions:  Standard    Time In: 10:00  Time Out: 10:45  Total Treatment Time: 45 minutes  Total Billable Time: 45 minutes    Subjective     Patient reports: She is doing well she has been doing her HEP    she was compliant with home exercise program given last session.   Response to previous treatment: Tolerated well  Functional change: Improved knowledge of HEP    Pain: 0/10  Location: bilateral hands     Objective     Objective measures updated at progress report unless specified.    Treatment     Supervised Modalities: Modalities such as hot/cold packs, traction, unattended electrical stimulation, paraffin bath, fluidotherapy to reduce pain and increase soft tissue extensibility. Leslie received (0) minutes of modalities listed below after being cleared for contraindications.  x = modalities performed     Supervised Modalities 6/19/2023                            Manual Therapy Techniques: Joint mobilizations, Soft tissue Mobilization, and mobilization with movement applied to the area listed below. Leslie received (0) minutes of interventions. x = intervention performed  today    Manual Intervention 6/19/2023    Soft Tissue Mobilization     Joint Mobilizations     Mobilization with movement              Therapeutic Exercises: to develop strength, endurance, ROM, flexibility, posture and core stabilization. Leslie received (15) minutes of exercises listed below. x = performed today * = level of progression of activity     Therapeutic Exercise 6/19/2023    Interossei  -green foam  -rubber bands x 20x bilateral    Digi flexion - yellow x 20x bilateral    Resistive pinch - yellow  -2pt  -3pt  -lateral x 20x bilateral    Thumb palmar   -adduction  -abduction x 20x bilateral    Thumb radial  -adduction  -abduction x 20x bilateral         Therapeutic Activities: Everyday tasks to address the combined need of improved strength, range of motion, and endurance to achieve increased independence. While simulating these activities, the person is applying the gained skills of strength and improved range of motion in a practical way.  Leslie received (15) minutes of activities listed below. x = activities performed today * = level of progression of activity     Therapeutic Activities 6/19/2023    Velcro board  -lateral pinch  - dowel  x 20x bilateral    Opening and closing containers x 5 minutes, ergonomics               Neuromuscular Re-education: the use of functional strengthening, stretching, balancing and coordination activities that train the nerves and muscles to react and communicate to restore normal body movement patterns to increase self care independence. Leslie received (15) minutes of interventions listed below.  x = interventions performed today * = level of progression of activity     Neuromuscular Re-education 6/19/2023    Finger opposition  x 20x   Finger taps (extension) x 20x   Finger opposition with slides            Self Care: Fundamental skills required to independently care for oneself. Activities of daily living such as eating, bathing, dressing, toileting, grooming,  sleeping, transfers and mobility. Instrumental activities of daily living such as driving, medication management, pet care, home management/cleaning, financial management, using the phone, meal preparation and shopping. Leslie received (0) minutes of interventions listed below.  x = interventions performed * = level of progression of activity     Self Care 6/19/2023                            Home Exercises and Education Provided     Education provided:   - home exercise program education provided  - progress towards goals     Written Home Exercises Provided: Patient instructed to cont prior HEP.  Exercises were reviewed and Leslie was able to demonstrate them prior to the end of the session. Leslie demonstrated good understanding of the home exercise program provided.     See EMR under Patient Instructions for exercises provided  5/17/2023 .        Assessment     Patient tolerated exercises well. Needs verbal cues for hand placement.     Leslie is progressing towards her goals and there are no updates to goals at this time. Patient prognosis is Good.     Patient will continue to benefit from skilled outpatient occupational therapy to address the deficits listed in the problem list on initial evaluation provide patient/family education and to maximize patient's level of independence in the home and community environment.     Patient's spiritual, cultural and educational needs considered and patient agreeable to plan of care and goals.    Anticipated barriers to occupational therapy: Cognition, home exercise program compliance    Goals:     Short Term Goals:  6 weeks  Progress    Pain: Patient will demonstrate improved pain by reports of less than or equal to 7/10 worst pain on the verbal rating scale in order to progress toward maximal functional ability and improve quality of life. PC   Function: Patient will demonstrate improved function as indicated by a functional limitation score of less than or equal to 36 out of 100  on FOTO. PC   Mobility: Patient will improve active range of motion to 50% of stated goals, listed in objective measures above, in order to progress towards independence with functional activities.  PC   Strength: Patient will improve strength to 50% of stated goals, listed in objective measures above, in order to progress towards independence with functional activities.  PC   HEP: Patient will demonstrate independence with home exercise program in order to progress toward functional independence. PC      Long Term Goals:  12 weeks  Progress   Pain: Patient will demonstrate improved pain by reports of less than or equal to 6/10 worst pain on the verbal rating scale in order to progress toward maximal functional ability and improve quality of life.   PC   Function: Patient will demonstrate improved function as indicated by a functional limitation score of less than or equal to 36 out of 100 on FOTO. PC   Mobility: Patient will improve active range of motion to stated goals, listed in objective measures above, in order to return to maximal functional potential and improve quality of life. PC   Strength: Patient will improve strength to stated goals, listed in objective measures above, in order to improve functional independence and quality of life. PC   Patient will return to normal ADL's, IADL's, community involvement, recreational activities, and work-related activities with less than or equal to 5/10 pain and maximal function.  PC      Goals Key:  PC= Progressing/Continue;       PM= Partially Met;       GM= Goal Met,      DC= Discontinue    Plan     Continue Plan of Care (POC) and progress per patient tolerance.      Ivana Gallo, OT  ,OTD, OTR/L

## 2023-06-25 NOTE — PROGRESS NOTES
[facility]   Physical Therapy Treatment Note + UPOC    Name: Leslie CASTELLON [facility] Number: [id number]    Therapy Diagnosis:   Encounter Diagnosis   Name Primary?    Impaired functional mobility, balance, gait, and endurance Yes     Physician: Sepideh Peters,*    Visit Date: 6/19/2023    Physician Orders: PT Eval and Treat   Medical Diagnosis from Referral: R29.6 (ICD-10-CM) - Falls frequently  Evaluation Date: 2/27/2023  Authorization Period Expiration: 2/17/24  Plan of Care Expiration: 4/28/23  Progress Note Due: 5/6/23  Visit # / Visits authorized: 15/16 (+eval)  FOTO: 0/3 not appropriate due to poor insight and cognitive deficits     Precautions: Standard and Fall     PTA Visit #: 0/5     Time In: 0915  Time Out: 1015  Total Billable Time: 60 minutes    SUBJECTIVE     Pt reports: Patient reports that lower back pain is significantly decreased.  She reports that she has not had to use pain reliever medication over the last week.  Response to previous treatment: She responded well to last session.   Functional change: She reports no functional change since last session.     Pain: 4/10  Location: right knee and lower back    OBJECTIVE     Objective Measures updated at progress report unless specified.           Patient reports a score of 46/56 on the Encarnacion Balance Assessment on 4/27/23.              Treatment     Leslie received the treatments listed below:      THERAPEUTIC EXERCISES to develop strength, endurance, ROM, flexibility, posture, and core stabilization for (60) minutes including:    Intervention Performed Today    Supine Stretches       x - 3x30 seconds bilateral for each:  - lower trunk rotation   - single knee to chest  - hamstring/ heelcord   Seated Stretches  - 3x30 seconds bilateral for each:  - hamstring/ heelcord  - hip adductor   Standing Stretches  - 3x15 seconds bilateral for each:  - IT band   - heelcord    Seated lower extremity exercises X              X  x -  hip flexion 2x10 bilateral   - hamstring curl 2x10 bilateral   - dorsiflexion/ plantarflexion 2x10 bilateral with green band  - eversion 1x10 bilateral   - toe curls and flares 1x20 bilateral   - hip adduction against ball 1x10 with 3 second hold  - hip abduction against belt 1x10 with 3 second hold  Clam shells red theraband sitting - 2x10  Long arc quads - 3# - 10 reps switching x 3 minutes   Standing lower extremity exercises        x - hip flexion 2x10 bilateral   - hip abduction 2x10 bilateral   - hip extension 2x10 bilateral   - heel raise 2x10    NuStep for endurance x - Level 2, 6 minutes   Bicycle for endurance and ROM  - Level 1 x 6 minutes   Supine x - Quad set with short arc quad 2x10 with 3 second hold   - dorsiflexion against green band 2x10 bilateral    Lower Trunk Rotations x 10 second holds x 10   Pelvic tilts x 10 second holds x 10   Bridges  x 2x10   Abdominal Bracing x Knee to chest - 1x10 bilateral    Straight leg raise   - 1x10 bilateral    Sitting      Neoprene wrap to Right knee for all exercises    neuromuscular re-education activities to improve: Balance, Coordination, Kinesthetic, Sense, Proprioception, and Posture for (0) minutes. The following activities were included:    Intervention Performed Today    Air Ex Beam        - Tandem holds 5 seconds, 3x's   - Tandem walking forward/backward 3x's each  - Side stepping 3x's each direction  - tandem walking over hurdles forward and side stepping 2x's each  - heel lifts and toe lifts off of beam with 2-0 hand supports   - single leg standing while tapping opposite foot forward and back on beam   Orange Rubber Beam    - Tandem holds 3 seconds, 3x's bilateral   - Tandem walking forward 4x's   - Side stepping 2x's each direction  - single leg standing while tapping opposite foot forward and back on beam   Air Ex Pad              - Weight shifts Left to Right 2x10  - Weight shifts forward/back 2x10  - single leg standing holds   - Eyes closed 10  seconds  - backward pivot steps with 1 hand support 1x8 bilateral   - standing on pad while toss/catching ball to wall 1x8  - One lower extremity on pad and opposite lower extremity on step tossing ball to wall    Hand paddled per Neuro LEONIE technique and for entirety of session     Core Exercises in supine  Bilateral lower extremities over Tball for each:   - lower trunk rotation 2x10  - bilateral knees to chest 2x10  - posterior pelvic tilt to bridge 2x10   Parallel bars      - walking backward 4x8 ft with 2-1 hand support  - braided walking 4x's length of bars  - narrow base standing without hand support performing head turns/nods 1x5 each, arm reach/lifts 1x5 each   Trunk extension for posture  - 2x10 over pillow and ball at low back    Sit to squat without hand support  2x10 from progressively lowered mat   Step ups  6 inch step  - 2 sets of 1 minute on each leg   Functional Re-Tests  - see above   Encarnacion Balance Scale  - see above   Agility Drills on Turf  - side stepping 2x20 ft  - backward walking 2x20 ft - without shoes  - tandem walking 2x15 ft - without shoes       THERAPEUTIC ACTIVITIES to improve dynamic and functional performance for () minutes including:    Intervention Performed Today    Patient and her granddaughter educated on Home Exercise Program for balance.  Also educated on wearing tennis shoes to future sessions.  Discussed the process of changing schedule to get her seen by an ortho PT 1 day/week then by me 1day/week  - Patient and granddaughter verbalized good understanding of education provided.                                              MANUAL THERAPY TECHNIQUES were applied for (30) minutes, including:    Manual Intervention Performed Today    Soft Tissue Mobilization x Bilateral thoracolumbar spine   Joint Mobilizations [x] PIVM lumbar spine    []     []    Functional Dry Needling  [x] Bilateral thoracolumbar spine     Plan for Next Visit: Continue as needed         Gait Training: to  improve functional mobility and safety for  () minutes, including:  - working on increasing step length and upright posture with gait sequence, ambulating 200ft and 100ft with contact guard assist for safety awareness.     Patient Education and Home Exercises     Home Exercises Provided and Patient Education Provided     Education provided:   - 3/14/23 Patient educated on Home Exercise Program for balance   - 3/16/23 Patient encouraged to stay compliant with Home Exercise Program   - 4/27/23 discussed continuing with James PT for dry needling 1 day/week for the next 4 weeks.   Written Home Exercises Provided: yes. Exercises were reviewed and Leslie was able to demonstrate them prior to the end of the session.  Leslie verbalized good  understanding of the education provided. See EMR under Patient Instructions for exercises provided during therapy sessions    ASSESSMENT     Patient reports that lower back pain is significantly decreased.  Therefore, worked on strengthening exercises.  Still with significant complaint of pain at the right knee.  There is a subluxation that occurs at the tibiofemoral joint which causes weakness and pain.  Working on strengthening of quadriceps outside of the subluxation range.  The patient may benefit from a brace to compress the region to activate quad and decrease the feeling of instability.  Spoke with patient's granddaughter about finding a place that provides a group exercise program on completion of PT.    Leslie Is progressing well towards her goals.   Pt prognosis is Good.     Pt will continue to benefit from skilled outpatient physical therapy to address the deficits listed in the problem list box on initial evaluation, provide pt/family education and to maximize pt's level of independence in the home and community environment.     Pt's spiritual, cultural and educational needs considered and pt agreeable to plan of care and goals.     Anticipated barriers to physical therapy:  co-morbidities, sedentary lifestyle, chronicity of condition, and lack of understanding of condition     GOALS:     Short Term Goals:  4 weeks Progress    Function: Patient will demonstrate improved function as indicated by a functional limitation score improved by 3 points from initial measure.  PC   Strength: Patient will demonstrate improved strength by performing 5x sit to  20 seconds in order to progress towards independence with functional activities.  Met   Balance: Complete Encarnacion Balance Scale and set goals accordingly.  Met   Coordination: Patient will demonstrate improved time of 13 sec on TUG to demonstrate improved coordination and safety with mobility. PC   HEP: Patient will demonstrate independence with HEP in order to progress toward functional independence.  Met   Determine the need of Custom Wheelchair Eval to improve patient's positioning and independence with pressure reliefs and independence with home and community mobility to decrease burden of care. D/C not needed at this time.              Long Term Goals:  8 weeks Progress   Function: Patient will demonstrate improved function as indicated by a functional limitation score improved 5 points from initial measure.  PC   Strength: Patient will demonstrate improved strength by performing 5x sit to  17 seconds in order to progress towards independence with functional activities.  Met   Patient will return to ADL's, IADL's, community involvement, recreational activities, and work-related activities with less than or equal to 5/10 pain and maximal function.  PC   Balance: Improve Encarnacion Balance score to 50/56 to demonstrate improved balance and decreased risk of falls.  PC   Coordination: Patient will demonstrate improved time of 11 sec on TUG to demonstrate improved coordination and safety with mobility. PC   HEP: Patient educated on HEP in preparation for d/c verbalizing and demonstrating good understanding of topics discussed.   PC             Goals Key:  PC= progressing/continue;        PM= partially met;             DC= discontinue    PLAN     Continue Plan of Care (POC) and progress per patient tolerance. See Treatment section for exercise progression.    Leoncio Lujan, PT

## 2023-06-26 ENCOUNTER — CLINICAL SUPPORT (OUTPATIENT)
Dept: REHABILITATION | Facility: HOSPITAL | Age: 83
End: 2023-06-26
Payer: MEDICARE

## 2023-06-26 DIAGNOSIS — R29.818 FINE MOTOR IMPAIRMENT: ICD-10-CM

## 2023-06-26 DIAGNOSIS — Z78.9 DECREASED ACTIVITIES OF DAILY LIVING (ADL): ICD-10-CM

## 2023-06-26 DIAGNOSIS — R29.898 DECREASED GRIP STRENGTH OF LEFT HAND: ICD-10-CM

## 2023-06-26 DIAGNOSIS — M62.81 DECREASED MUSCLE STRENGTH: ICD-10-CM

## 2023-06-26 DIAGNOSIS — Z74.09 IMPAIRED FUNCTIONAL MOBILITY, BALANCE, GAIT, AND ENDURANCE: Primary | ICD-10-CM

## 2023-06-26 DIAGNOSIS — M25.60 RANGE OF MOTION DEFICIT: Primary | ICD-10-CM

## 2023-06-26 DIAGNOSIS — R29.898 FINE MOTOR IMPAIRMENT: ICD-10-CM

## 2023-06-26 PROCEDURE — 97110 THERAPEUTIC EXERCISES: CPT

## 2023-06-26 PROCEDURE — 97112 NEUROMUSCULAR REEDUCATION: CPT

## 2023-06-26 PROCEDURE — 97530 THERAPEUTIC ACTIVITIES: CPT

## 2023-06-26 NOTE — PROGRESS NOTES
[facility]  Occupational Therapy Treatment Note     Date: 6/26/2023  Name: Leslie CASTELLON [facility] Number: [id number]    Therapy Diagnosis:   Encounter Diagnoses   Name Primary?    Range of motion deficit Yes    Decreased  strength of left hand     Decreased muscle strength     Decreased activities of daily living (ADL)     Fine motor impairment      Physician: Angeles Carson MD    Physician Orders: Occupational Therapy to Evaluate and Treat   Medical Diagnosis: R29.898 (ICD-10-CM) - Poor fine motor skills  Surgical Procedure and Date: N/A     Evaluation Date: 5/17/2023  Insurance Authorization Period Expiration: 6/5/2023 - 12/31/2023  Plan of Care Certification Period: 5/17/2023 - 10/17/2023  Progress Note Due: 6/17/2023      Date of Return to MD: N/A     Visit # / Visits authorized: 2/25 (3 Episode Visits)   FOTO: 1/3     Precautions:  Standard    Time In: 3:25  Time Out: 4:15  Total Treatment Time: 55 minutes  Total Billable Time: 55 minutes    Subjective     Patient reports: She is doing well she has been doing her home exercise program. She feels like her hands are getting stronger.    she was compliant with home exercise program given last session.   Response to previous treatment: Tolerated well  Functional change: Improved knowledge of HEP    Pain: 0/10  Location: bilateral hands     Objective     Objective measures updated at progress report unless specified.    Treatment     Supervised Modalities: Modalities such as hot/cold packs, traction, unattended electrical stimulation, paraffin bath, fluidotherapy to reduce pain and increase soft tissue extensibility. Leslie received (0) minutes of modalities listed below after being cleared for contraindications.  x = modalities performed     Supervised Modalities 6/26/2023                            Manual Therapy Techniques: Joint mobilizations, Soft tissue Mobilization, and mobilization with movement applied to the area listed below. Leslie  received (0) minutes of interventions. x = intervention performed today    Manual Intervention 6/26/2023    Soft Tissue Mobilization     Joint Mobilizations     Mobilization with movement              Therapeutic Exercises: to develop strength, endurance, ROM, flexibility, posture and core stabilization. Leslie received (25) minutes of exercises listed below. x = performed today * = level of progression of activity     Therapeutic Exercise 6/26/2023    Interossei  -green foam  -rubber bands x 20x bilateral    Digi flexion - yellow x 20x bilateral    Resistive pinch - yellow  -2pt  -3pt  -lateral x 20x bilateral    Thumb palmar   -adduction  -abduction x 20x bilateral    Thumb radial  -adduction  -abduction x 20x bilateral    Forearm exercise  - wrist flexion  -wrist extension  - supination  -pronation x 2lb        Therapeutic Activities: Everyday tasks to address the combined need of improved strength, range of motion, and endurance to achieve increased independence. While simulating these activities, the person is applying the gained skills of strength and improved range of motion in a practical way.  Leslie received (15) minutes of activities listed below. x = activities performed today * = level of progression of activity     Therapeutic Activities 6/26/2023    Velcro board  -lateral pinch  - dowel  x 20x bilateral    Opening and closing containers x 5 minutes, ergonomics               Neuromuscular Re-education: the use of functional strengthening, stretching, balancing and coordination activities that train the nerves and muscles to react and communicate to restore normal body movement patterns to increase self care independence. Leslie received (15) minutes of interventions listed below.  x = interventions performed today * = level of progression of activity     Neuromuscular Re-education 6/26/2023    Finger opposition  x 20x   Finger taps (extension) x 20x   Finger opposition with slides            Self Care:  Fundamental skills required to independently care for oneself. Activities of daily living such as eating, bathing, dressing, toileting, grooming, sleeping, transfers and mobility. Instrumental activities of daily living such as driving, medication management, pet care, home management/cleaning, financial management, using the phone, meal preparation and shopping. Leslie received (0) minutes of interventions listed below.  x = interventions performed * = level of progression of activity     Self Care 6/26/2023                            Home Exercises and Education Provided     Education provided:   - home exercise program education provided  - progress towards goals     Written Home Exercises Provided: Patient instructed to cont prior HEP.  Exercises were reviewed and Leslie was able to demonstrate them prior to the end of the session. Leslie demonstrated good understanding of the home exercise program provided.     See EMR under Patient Instructions for exercises provided  5/17/2023 .        Assessment     Patient tolerated exercises well. Needs verbal cues for hand placement.     Leslie is progressing towards her goals and there are no updates to goals at this time. Patient prognosis is Good.     Patient will continue to benefit from skilled outpatient occupational therapy to address the deficits listed in the problem list on initial evaluation provide patient/family education and to maximize patient's level of independence in the home and community environment.     Patient's spiritual, cultural and educational needs considered and patient agreeable to plan of care and goals.    Anticipated barriers to occupational therapy: Cognition, home exercise program compliance    Goals:     Short Term Goals:  6 weeks  Progress    Pain: Patient will demonstrate improved pain by reports of less than or equal to 7/10 worst pain on the verbal rating scale in order to progress toward maximal functional ability and improve quality of  life. PC   Function: Patient will demonstrate improved function as indicated by a functional limitation score of less than or equal to 36 out of 100 on FOTO. PC   Mobility: Patient will improve active range of motion to 50% of stated goals, listed in objective measures above, in order to progress towards independence with functional activities.  PC   Strength: Patient will improve strength to 50% of stated goals, listed in objective measures above, in order to progress towards independence with functional activities.  PC   HEP: Patient will demonstrate independence with home exercise program in order to progress toward functional independence. PC      Long Term Goals:  12 weeks  Progress   Pain: Patient will demonstrate improved pain by reports of less than or equal to 6/10 worst pain on the verbal rating scale in order to progress toward maximal functional ability and improve quality of life.   PC   Function: Patient will demonstrate improved function as indicated by a functional limitation score of less than or equal to 36 out of 100 on FOTO. PC   Mobility: Patient will improve active range of motion to stated goals, listed in objective measures above, in order to return to maximal functional potential and improve quality of life. PC   Strength: Patient will improve strength to stated goals, listed in objective measures above, in order to improve functional independence and quality of life. PC   Patient will return to normal ADL's, IADL's, community involvement, recreational activities, and work-related activities with less than or equal to 5/10 pain and maximal function.  PC      Goals Key:  PC= Progressing/Continue;       PM= Partially Met;       GM= Goal Met,      DC= Discontinue    Plan     Continue Plan of Care (POC) and progress per patient tolerance.      Ivana Gallo, OT  ,OTD, OTR/L

## 2023-06-27 ENCOUNTER — TELEPHONE (OUTPATIENT)
Dept: INTERNAL MEDICINE | Facility: CLINIC | Age: 83
End: 2023-06-27
Payer: MEDICARE

## 2023-06-27 ENCOUNTER — TELEPHONE (OUTPATIENT)
Dept: ORTHOPEDICS | Facility: CLINIC | Age: 83
End: 2023-06-27
Payer: MEDICARE

## 2023-06-27 NOTE — TELEPHONE ENCOUNTER
Returned the patient's granddaughter phone call in regards to their message. Patient's granddaughter states that they would like to get the patient schedule with Dr. Ibarra for a steroid injection. I got the patient schedule to see Dr. Ibarra on 07/06/23 at 2:00 for a steroid injection. Verbalized understanding.           ----- Message from Camryn Esteban sent at 6/27/2023  3:45 PM CDT -----  Pts granddaughter is requesting a call back concerning knee injections for the pt. Call back at [phone]

## 2023-06-27 NOTE — TELEPHONE ENCOUNTER
----- Message from Camryn Esteban sent at 6/27/2023  3:46 PM CDT -----  Pts granddaughter is requesting orders for the pt to have a mammogram. She stated one of the pts breasts is larger than the other and the pt is concerned about it. Call back at [phone]

## 2023-06-28 NOTE — PROGRESS NOTES
[facility]   Physical Therapy Treatment Note + UPOC    Name: Leslie CASTELLON [facility] Number: [id number]    Therapy Diagnosis:   Encounter Diagnosis   Name Primary?    Impaired functional mobility, balance, gait, and endurance Yes     Physician: Sepideh Peters,*    Visit Date: 6/26/2023    Physician Orders: PT Eval and Treat   Medical Diagnosis from Referral: R29.6 (ICD-10-CM) - Falls frequently  Evaluation Date: 2/27/2023  Authorization Period Expiration: 2/17/24  Plan of Care Expiration: 4/28/23  Progress Note Due: 5/6/23  Visit # / Visits authorized: 16/16 (+eval)  FOTO: 0/3 not appropriate due to poor insight and cognitive deficits     Precautions: Standard and Fall     PTA Visit #: 0/5     Time In: 1430  Time Out: 1530  Total Billable Time: 60 minutes    SUBJECTIVE     Pt reports: Patient reports that lower back pain is significantly decreased.  She reports that she has not had to use pain reliever medication over the last week.  Response to previous treatment: She responded well to last session.   Functional change: She reports no functional change since last session.     Pain: 4/10  Location: right knee and lower back    OBJECTIVE     Objective Measures updated at progress report unless specified.           Patient reports a score of 46/56 on the Encarnacion Balance Assessment on 4/27/23.              Treatment     Leslie received the treatments listed below:      THERAPEUTIC EXERCISES to develop strength, endurance, ROM, flexibility, posture, and core stabilization for (60) minutes including:    Intervention Performed Today    Supine Stretches       x - 3x30 seconds bilateral for each:  - lower trunk rotation   - single knee to chest  - hamstring/ heelcord   Seated Stretches  - 3x30 seconds bilateral for each:  - hamstring/ heelcord  - hip adductor   Standing Stretches  - 3x15 seconds bilateral for each:  - IT band   - heelcord    Seated lower extremity exercises X              X  x  - hip flexion 2x10 bilateral   - hamstring curl 2x10 bilateral   - dorsiflexion/ plantarflexion 2x10 bilateral with green band  - eversion 1x10 bilateral   - toe curls and flares 1x20 bilateral   - hip adduction against ball 1x10 with 3 second hold  - hip abduction against belt 1x10 with 3 second hold  Clam shells red theraband sitting - 2x10  Long arc quads - 3# - 10 reps switching x 3 minutes   Standing lower extremity exercises        x - hip flexion 2x10 bilateral   - hip abduction 2x10 bilateral   - hip extension 2x10 bilateral   - heel raise 2x10    NuStep for endurance x - Level 2, 6 minutes   Bicycle for endurance and ROM  - Level 1 x 6 minutes   Supine x - Quad set with short arc quad 2x10 with 3 second hold   - dorsiflexion against green band 2x10 bilateral    Lower Trunk Rotations x 10 second holds x 10   Pelvic tilts x 10 second holds x 10   Bridges  x 2x10   Abdominal Bracing x Knee to chest - 1x10 bilateral    Straight leg raise   - 1x10 bilateral    Sitting      Neoprene wrap to Right knee for all exercises    neuromuscular re-education activities to improve: Balance, Coordination, Kinesthetic, Sense, Proprioception, and Posture for (0) minutes. The following activities were included:    Intervention Performed Today    Air Ex Beam        - Tandem holds 5 seconds, 3x's   - Tandem walking forward/backward 3x's each  - Side stepping 3x's each direction  - tandem walking over hurdles forward and side stepping 2x's each  - heel lifts and toe lifts off of beam with 2-0 hand supports   - single leg standing while tapping opposite foot forward and back on beam   Orange Rubber Beam    - Tandem holds 3 seconds, 3x's bilateral   - Tandem walking forward 4x's   - Side stepping 2x's each direction  - single leg standing while tapping opposite foot forward and back on beam   Air Ex Pad              - Weight shifts Left to Right 2x10  - Weight shifts forward/back 2x10  - single leg standing holds   - Eyes closed 10  seconds  - backward pivot steps with 1 hand support 1x8 bilateral   - standing on pad while toss/catching ball to wall 1x8  - One lower extremity on pad and opposite lower extremity on step tossing ball to wall    Hand paddled per Neuro LEONIE technique and for entirety of session     Core Exercises in supine  Bilateral lower extremities over Tball for each:   - lower trunk rotation 2x10  - bilateral knees to chest 2x10  - posterior pelvic tilt to bridge 2x10   Parallel bars      - walking backward 4x8 ft with 2-1 hand support  - braided walking 4x's length of bars  - narrow base standing without hand support performing head turns/nods 1x5 each, arm reach/lifts 1x5 each   Trunk extension for posture  - 2x10 over pillow and ball at low back    Sit to squat without hand support  2x10 from progressively lowered mat   Step ups  6 inch step  - 2 sets of 1 minute on each leg   Functional Re-Tests  - see above   Encarnacion Balance Scale  - see above   Agility Drills on Turf  - side stepping 2x20 ft  - backward walking 2x20 ft - without shoes  - tandem walking 2x15 ft - without shoes       THERAPEUTIC ACTIVITIES to improve dynamic and functional performance for () minutes including:    Intervention Performed Today    Patient and her granddaughter educated on Home Exercise Program for balance.  Also educated on wearing tennis shoes to future sessions.  Discussed the process of changing schedule to get her seen by an ortho PT 1 day/week then by me 1day/week  - Patient and granddaughter verbalized good understanding of education provided.                                              MANUAL THERAPY TECHNIQUES were applied for (30) minutes, including:    Manual Intervention Performed Today    Soft Tissue Mobilization x Bilateral thoracolumbar spine   Joint Mobilizations [x] PIVM lumbar spine    []     []    Functional Dry Needling  [x] Bilateral thoracolumbar spine     Plan for Next Visit: Continue as needed         Gait Training: to  improve functional mobility and safety for  () minutes, including:  - working on increasing step length and upright posture with gait sequence, ambulating 200ft and 100ft with contact guard assist for safety awareness.     Patient Education and Home Exercises     Home Exercises Provided and Patient Education Provided     Education provided:   - 3/14/23 Patient educated on Home Exercise Program for balance   - 3/16/23 Patient encouraged to stay compliant with Home Exercise Program   - 4/27/23 discussed continuing with [facility] PT for dry needling 1 day/week for the next 4 weeks.   Written Home Exercises Provided: yes. Exercises were reviewed and Leslie was able to demonstrate them prior to the end of the session.  Leslie verbalized good  understanding of the education provided. See EMR under Patient Instructions for exercises provided during therapy sessions    ASSESSMENT     Patient reports that lower back pain is significantly decreased.  Therefore, worked on strengthening exercises.  Still with significant complaint of pain at the right knee.  There is a subluxation that occurs at the tibiofemoral joint which causes weakness and pain.  Working on strengthening of quadriceps outside of the subluxation range.  The patient may benefit from a brace to compress the region to activate quad and decrease the feeling of instability.  Spoke with patient's granddaughter about finding a place that provides a group exercise program on completion of PT.    Leslie Is progressing well towards her goals.   Pt prognosis is Good.     Pt will continue to benefit from skilled outpatient physical therapy to address the deficits listed in the problem list box on initial evaluation, provide pt/family education and to maximize pt's level of independence in the home and community environment.     Pt's spiritual, cultural and educational needs considered and pt agreeable to plan of care and goals.     Anticipated barriers to physical therapy:  co-morbidities, sedentary lifestyle, chronicity of condition, and lack of understanding of condition     GOALS:     Short Term Goals:  4 weeks Progress    Function: Patient will demonstrate improved function as indicated by a functional limitation score improved by 3 points from initial measure.  PC   Strength: Patient will demonstrate improved strength by performing 5x sit to  20 seconds in order to progress towards independence with functional activities.  Met   Balance: Complete Encarnacion Balance Scale and set goals accordingly.  Met   Coordination: Patient will demonstrate improved time of 13 sec on TUG to demonstrate improved coordination and safety with mobility. PC   HEP: Patient will demonstrate independence with HEP in order to progress toward functional independence.  Met   Determine the need of Custom Wheelchair Eval to improve patient's positioning and independence with pressure reliefs and independence with home and community mobility to decrease burden of care. D/C not needed at this time.              Long Term Goals:  8 weeks Progress   Function: Patient will demonstrate improved function as indicated by a functional limitation score improved 5 points from initial measure.  PC   Strength: Patient will demonstrate improved strength by performing 5x sit to  17 seconds in order to progress towards independence with functional activities.  Met   Patient will return to ADL's, IADL's, community involvement, recreational activities, and work-related activities with less than or equal to 5/10 pain and maximal function.  PC   Balance: Improve Encarnacion Balance score to 50/56 to demonstrate improved balance and decreased risk of falls.  PC   Coordination: Patient will demonstrate improved time of 11 sec on TUG to demonstrate improved coordination and safety with mobility. PC   HEP: Patient educated on HEP in preparation for d/c verbalizing and demonstrating good understanding of topics discussed.   PC             Goals Key:  PC= progressing/continue;        PM= partially met;             DC= discontinue    PLAN     Continue Plan of Care (POC) and progress per patient tolerance. See Treatment section for exercise progression.    Leoncio Lujan, PT

## 2023-06-30 ENCOUNTER — EXTERNAL CHRONIC CARE MANAGEMENT (OUTPATIENT)
Dept: PRIMARY CARE CLINIC | Facility: CLINIC | Age: 83
End: 2023-06-30
Payer: MEDICARE

## 2023-06-30 PROCEDURE — 99439 CHRNC CARE MGMT STAF EA ADDL: CPT | Mod: PBBFAC,27 | Performed by: FAMILY MEDICINE

## 2023-06-30 PROCEDURE — 99490 CHRNC CARE MGMT STAFF 1ST 20: CPT | Mod: PBBFAC | Performed by: FAMILY MEDICINE

## 2023-06-30 PROCEDURE — 99490 PR CHRONIC CARE MGMT, 1ST 20 MIN: ICD-10-PCS | Mod: S$PBB,,, | Performed by: FAMILY MEDICINE

## 2023-06-30 PROCEDURE — 99439 CHRNC CARE MGMT STAF EA ADDL: CPT | Mod: S$PBB,,, | Performed by: FAMILY MEDICINE

## 2023-06-30 PROCEDURE — 99439 PR CHRONIC CARE MGMT, EA ADDTL 20 MIN: ICD-10-PCS | Mod: S$PBB,,, | Performed by: FAMILY MEDICINE

## 2023-06-30 PROCEDURE — 99490 CHRNC CARE MGMT STAFF 1ST 20: CPT | Mod: S$PBB,,, | Performed by: FAMILY MEDICINE

## 2023-07-03 ENCOUNTER — OFFICE VISIT (OUTPATIENT)
Dept: INTERNAL MEDICINE | Facility: CLINIC | Age: 83
End: 2023-07-03
Payer: MEDICARE

## 2023-07-03 ENCOUNTER — CLINICAL SUPPORT (OUTPATIENT)
Dept: REHABILITATION | Facility: HOSPITAL | Age: 83
End: 2023-07-03
Payer: MEDICARE

## 2023-07-03 VITALS
BODY MASS INDEX: 25.23 KG/M2 | OXYGEN SATURATION: 97 % | WEIGHT: 133.63 LBS | TEMPERATURE: 98 F | HEIGHT: 61 IN | HEART RATE: 96 BPM | SYSTOLIC BLOOD PRESSURE: 112 MMHG | DIASTOLIC BLOOD PRESSURE: 62 MMHG

## 2023-07-03 DIAGNOSIS — N64.4 BREAST PAIN: Primary | ICD-10-CM

## 2023-07-03 DIAGNOSIS — Z74.09 IMPAIRED FUNCTIONAL MOBILITY, BALANCE, GAIT, AND ENDURANCE: Primary | ICD-10-CM

## 2023-07-03 PROCEDURE — 97110 THERAPEUTIC EXERCISES: CPT

## 2023-07-03 PROCEDURE — 99999 PR PBB SHADOW E&M-EST. PATIENT-LVL IV: ICD-10-PCS | Mod: PBBFAC,,, | Performed by: NURSE PRACTITIONER

## 2023-07-03 PROCEDURE — 99214 OFFICE O/P EST MOD 30 MIN: CPT | Mod: PBBFAC | Performed by: NURSE PRACTITIONER

## 2023-07-03 PROCEDURE — 99999 PR PBB SHADOW E&M-EST. PATIENT-LVL IV: CPT | Mod: PBBFAC,,, | Performed by: NURSE PRACTITIONER

## 2023-07-03 PROCEDURE — 99213 PR OFFICE/OUTPT VISIT, EST, LEVL III, 20-29 MIN: ICD-10-PCS | Mod: S$PBB,,, | Performed by: NURSE PRACTITIONER

## 2023-07-03 PROCEDURE — 99213 OFFICE O/P EST LOW 20 MIN: CPT | Mod: S$PBB,,, | Performed by: NURSE PRACTITIONER

## 2023-07-03 NOTE — PROGRESS NOTES
[facility]   Physical Therapy Treatment Note + UPOC    Name: Leslie CASTELLON [facility] Number: [id number]    Therapy Diagnosis:   Encounter Diagnosis   Name Primary?    Impaired functional mobility, balance, gait, and endurance Yes     Physician: Sepideh Peters,*    Visit Date: 7/3/2023    Physician Orders: PT Eval and Treat   Medical Diagnosis from Referral: R29.6 (ICD-10-CM) - Falls frequently  Evaluation Date: 2/27/2023  Authorization Period Expiration: 2/17/24  Plan of Care Expiration: 4/28/23  Progress Note Due: 5/6/23  Visit # / Visits authorized: 16/16 (+eval)  FOTO: 0/3 not appropriate due to poor insight and cognitive deficits     Precautions: Standard and Fall     PTA Visit #: 0/5     Time In: 1215  Time Out: 1315  Total Billable Time: 60 minutes    SUBJECTIVE     Pt reports: Patient reports that lower back pain is decreased.  Still with complaint of knee pain.  Response to previous treatment: She responded well to last session.   Functional change: She reports no functional change since last session.     Pain: 4/10  Location: right knee and lower back    OBJECTIVE     Objective Measures updated at progress report unless specified.           Patient reports a score of 46/56 on the Encarnacion Balance Assessment on 4/27/23.              Treatment     Leslie received the treatments listed below:      THERAPEUTIC EXERCISES to develop strength, endurance, ROM, flexibility, posture, and core stabilization for (60) minutes including:    Intervention Performed Today    Supine Stretches       x - 3x30 seconds bilateral for each:  - lower trunk rotation   - single knee to chest  - hamstring/ heelcord   Seated Stretches  - 3x30 seconds bilateral for each:  - hamstring/ heelcord  - hip adductor   Standing Stretches  - 3x15 seconds bilateral for each:  - IT band   - heelcord    Seated lower extremity exercises X              X  x - hip flexion 2x10 bilateral   - hamstring curl 2x10 bilateral    - dorsiflexion/ plantarflexion 2x10 bilateral with green band  - eversion 1x10 bilateral   - toe curls and flares 1x20 bilateral   - hip adduction against ball 1x10 with 3 second hold  - hip abduction against belt 1x10 with 3 second hold  Clam shells red theraband sitting - 2x10  Long arc quads - 3# - 10 reps switching x 3 minutes   Standing lower extremity exercises        x - hip flexion 2x10 bilateral   - hip abduction 2x10 bilateral   - hip extension 2x10 bilateral   - heel raise 2x10    NuStep for endurance x - Level 2, 10 minutes   Bicycle for endurance and ROM  - Level 1 x 6 minutes   Supine x - Quad set with short arc quad 2x10 with 3 second hold   - dorsiflexion against green band 2x10 bilateral    Lower Trunk Rotations x 10 second holds x 10   Pelvic tilts x 10 second holds x 10   Bridges  x 2x10   Abdominal Bracing x Knee to chest - 1x10 bilateral    Straight leg raise   - 1x10 bilateral    Sitting      Neoprene wrap to Right knee for all exercises    neuromuscular re-education activities to improve: Balance, Coordination, Kinesthetic, Sense, Proprioception, and Posture for (0) minutes. The following activities were included:    Intervention Performed Today    Air Ex Beam        - Tandem holds 5 seconds, 3x's   - Tandem walking forward/backward 3x's each  - Side stepping 3x's each direction  - tandem walking over hurdles forward and side stepping 2x's each  - heel lifts and toe lifts off of beam with 2-0 hand supports   - single leg standing while tapping opposite foot forward and back on beam   Orange Rubber Beam    - Tandem holds 3 seconds, 3x's bilateral   - Tandem walking forward 4x's   - Side stepping 2x's each direction  - single leg standing while tapping opposite foot forward and back on beam   Air Ex Pad              - Weight shifts Left to Right 2x10  - Weight shifts forward/back 2x10  - single leg standing holds   - Eyes closed 10 seconds  - backward pivot steps with 1 hand support 1x8  bilateral   - standing on pad while toss/catching ball to wall 1x8  - One lower extremity on pad and opposite lower extremity on step tossing ball to wall    Hand paddled per Neuro LEONIE technique and for entirety of session     Core Exercises in supine  Bilateral lower extremities over Tball for each:   - lower trunk rotation 2x10  - bilateral knees to chest 2x10  - posterior pelvic tilt to bridge 2x10   Parallel bars      - walking backward 4x8 ft with 2-1 hand support  - braided walking 4x's length of bars  - narrow base standing without hand support performing head turns/nods 1x5 each, arm reach/lifts 1x5 each   Trunk extension for posture  - 2x10 over pillow and ball at low back    Sit to squat without hand support  2x10 from progressively lowered mat   Step ups  6 inch step  - 2 sets of 1 minute on each leg   Functional Re-Tests  - see above   Encarnacion Balance Scale  - see above   Agility Drills on Turf  - side stepping 2x20 ft  - backward walking 2x20 ft - without shoes  - tandem walking 2x15 ft - without shoes       THERAPEUTIC ACTIVITIES to improve dynamic and functional performance for () minutes including:    Intervention Performed Today    Patient and her granddaughter educated on Home Exercise Program for balance.  Also educated on wearing tennis shoes to future sessions.  Discussed the process of changing schedule to get her seen by an ortho PT 1 day/week then by me 1day/week  - Patient and granddaughter verbalized good understanding of education provided.                                              MANUAL THERAPY TECHNIQUES were applied for (30) minutes, including:    Manual Intervention Performed Today    Soft Tissue Mobilization x Bilateral thoracolumbar spine   Joint Mobilizations [x] PIVM lumbar spine    []     []    Functional Dry Needling  [x] Bilateral thoracolumbar spine     Plan for Next Visit: Continue as needed         Gait Training: to improve functional mobility and safety for  () minutes,  including:  - working on increasing step length and upright posture with gait sequence, ambulating 200ft and 100ft with contact guard assist for safety awareness.     Patient Education and Home Exercises     Home Exercises Provided and Patient Education Provided     Education provided:   - 3/14/23 Patient educated on Home Exercise Program for balance   - 3/16/23 Patient encouraged to stay compliant with Home Exercise Program   - 4/27/23 discussed continuing with James PT for dry needling 1 day/week for the next 4 weeks.   Written Home Exercises Provided: yes. Exercises were reviewed and Leslie was able to demonstrate them prior to the end of the session.  Leslie verbalized good  understanding of the education provided. See EMR under Patient Instructions for exercises provided during therapy sessions    ASSESSMENT     Patient reports that lower back pain is significantly decreased.  Continued working on lower extremity strengthening.  Good tolerance but does report of pain at the knee at times when moving from flexion to extension again resistance.  Did work significantly on strengthening exercises in position that did not increase pain.  Again, spoke with granddaughter about exercise program on completion.  Spoke about possible participation in [facility].    Leslie Is progressing well towards her goals.   Pt prognosis is Good.     Pt will continue to benefit from skilled outpatient physical therapy to address the deficits listed in the problem list box on initial evaluation, provide pt/family education and to maximize pt's level of independence in the home and community environment.     Pt's spiritual, cultural and educational needs considered and pt agreeable to plan of care and goals.     Anticipated barriers to physical therapy: co-morbidities, sedentary lifestyle, chronicity of condition, and lack of understanding of condition     GOALS:     Short Term Goals:  4 weeks Progress    Function: Patient will demonstrate  improved function as indicated by a functional limitation score improved by 3 points from initial measure.  PC   Strength: Patient will demonstrate improved strength by performing 5x sit to  20 seconds in order to progress towards independence with functional activities.  Met   Balance: Complete Encarnacion Balance Scale and set goals accordingly.  Met   Coordination: Patient will demonstrate improved time of 13 sec on TUG to demonstrate improved coordination and safety with mobility. PC   HEP: Patient will demonstrate independence with HEP in order to progress toward functional independence.  Met   Determine the need of Custom Wheelchair Eval to improve patient's positioning and independence with pressure reliefs and independence with home and community mobility to decrease burden of care. D/C not needed at this time.              Long Term Goals:  8 weeks Progress   Function: Patient will demonstrate improved function as indicated by a functional limitation score improved 5 points from initial measure.  PC   Strength: Patient will demonstrate improved strength by performing 5x sit to  17 seconds in order to progress towards independence with functional activities.  Met   Patient will return to ADL's, IADL's, community involvement, recreational activities, and work-related activities with less than or equal to 5/10 pain and maximal function.  PC   Balance: Improve Encarnacion Balance score to 50/56 to demonstrate improved balance and decreased risk of falls.  PC   Coordination: Patient will demonstrate improved time of 11 sec on TUG to demonstrate improved coordination and safety with mobility. PC   HEP: Patient educated on HEP in preparation for d/c verbalizing and demonstrating good understanding of topics discussed.   PC            Goals Key:  PC= progressing/continue;        PM= partially met;             DC= discontinue    PLAN     Continue Plan of Care (POC) and progress per patient tolerance. See Treatment  section for exercise progression.    Leoncio Lujan, PT

## 2023-07-05 ENCOUNTER — HOSPITAL ENCOUNTER (OUTPATIENT)
Dept: RADIOLOGY | Facility: HOSPITAL | Age: 83
Discharge: HOME | End: 2023-07-05
Attending: NURSE PRACTITIONER
Payer: MEDICARE

## 2023-07-05 DIAGNOSIS — N64.4 BREAST PAIN: ICD-10-CM

## 2023-07-05 PROCEDURE — 77062 BREAST TOMOSYNTHESIS BI: CPT | Mod: TC

## 2023-07-05 PROCEDURE — 77062 MAMMO DIGITAL DIAGNOSTIC BILAT WITH TOMO: ICD-10-PCS | Mod: 26,,, | Performed by: RADIOLOGY

## 2023-07-05 PROCEDURE — 77066 DX MAMMO INCL CAD BI: CPT | Mod: 26,,, | Performed by: RADIOLOGY

## 2023-07-05 PROCEDURE — 77062 BREAST TOMOSYNTHESIS BI: CPT | Mod: 26,,, | Performed by: RADIOLOGY

## 2023-07-05 PROCEDURE — 77066 MAMMO DIGITAL DIAGNOSTIC BILAT WITH TOMO: ICD-10-PCS | Mod: 26,,, | Performed by: RADIOLOGY

## 2023-07-06 ENCOUNTER — OFFICE VISIT (OUTPATIENT)
Dept: ORTHOPEDICS | Facility: CLINIC | Age: 83
End: 2023-07-06
Payer: MEDICARE

## 2023-07-06 VITALS — HEIGHT: 61 IN | BODY MASS INDEX: 25.11 KG/M2 | WEIGHT: 133 LBS

## 2023-07-06 DIAGNOSIS — L03.116 CELLULITIS OF LEFT LOWER EXTREMITY: ICD-10-CM

## 2023-07-06 DIAGNOSIS — Z98.1 HISTORY OF LUMBAR FUSION: ICD-10-CM

## 2023-07-06 DIAGNOSIS — M23.51 RECURRENT RIGHT KNEE INSTABILITY: ICD-10-CM

## 2023-07-06 DIAGNOSIS — M17.11 ARTHRITIS OF KNEE, RIGHT: Primary | ICD-10-CM

## 2023-07-06 PROCEDURE — 99999 PR PBB SHADOW E&M-EST. PATIENT-LVL III: ICD-10-PCS | Mod: PBBFAC,,, | Performed by: ORTHOPAEDIC SURGERY

## 2023-07-06 PROCEDURE — 20610 DRAIN/INJ JOINT/BURSA W/O US: CPT | Mod: PBBFAC | Performed by: ORTHOPAEDIC SURGERY

## 2023-07-06 PROCEDURE — 99213 OFFICE O/P EST LOW 20 MIN: CPT | Mod: 25,S$PBB,, | Performed by: ORTHOPAEDIC SURGERY

## 2023-07-06 PROCEDURE — 20610 LARGE JOINT ASPIRATION/INJECTION: R KNEE: ICD-10-PCS | Mod: S$PBB,RT,, | Performed by: ORTHOPAEDIC SURGERY

## 2023-07-06 PROCEDURE — 99213 PR OFFICE/OUTPT VISIT, EST, LEVL III, 20-29 MIN: ICD-10-PCS | Mod: 25,S$PBB,, | Performed by: ORTHOPAEDIC SURGERY

## 2023-07-06 PROCEDURE — 99999 PR PBB SHADOW E&M-EST. PATIENT-LVL III: CPT | Mod: PBBFAC,,, | Performed by: ORTHOPAEDIC SURGERY

## 2023-07-06 PROCEDURE — 99213 OFFICE O/P EST LOW 20 MIN: CPT | Mod: PBBFAC,25 | Performed by: ORTHOPAEDIC SURGERY

## 2023-07-06 RX ORDER — TRIAMCINOLONE ACETONIDE 40 MG/ML
80 INJECTION, SUSPENSION INTRA-ARTICULAR; INTRAMUSCULAR
Status: DISCONTINUED | OUTPATIENT
Start: 2023-07-06 | End: 2023-07-06 | Stop reason: HOSPADM

## 2023-07-06 RX ADMIN — TRIAMCINOLONE ACETONIDE 80 MG: 200 INJECTION, SUSPENSION INTRA-ARTICULAR; INTRAMUSCULAR at 02:07

## 2023-07-06 NOTE — PATIENT INSTRUCTIONS
Your right knee subluxes because of the severe arthritis   No brace will hold this in place it will give you severe pain around the knee because you skin is so thin and you will break out  The cellulitis in the left leg all cleared up with antibiotics  You already tried the gel injection on 05/19/2023-Synvisc as well as Depo-Medrol twice prior to that without much success   I will try Kenalog 80 mg/try and Kenalog injection with 5 cc 1% lidocaine into the right knee to see if this different steroid might help  If does not help there will be 1 long-acting steroid injection that we can give it is called Zilretta but we need to try other steroid and see how that works out 1st  Tylenol is her friend not to exceed 650 mg 3 times a day  You can not take arthritis medications you are on blood thinners  Return to see me in 3 months

## 2023-07-06 NOTE — PROGRESS NOTES
Subjective:     Patient ID: Leslie Wood is a 83 y.o. female.    Chief Complaint: Pain and Swelling of the Right Knee    HPI:  Patient had history of a stroke in November and she has sustained a fall where she got x-rays of her hips which were normal x-ray of the knee showed arthritis and she has a hemarthrosis that was aspirated.  She had a CT scan which I reviewed of her head that showed that she had a stroke and we do not think that she is a candidate for total knee replacement due to medical conditions.  I did tell him anesthesia might bring her level of function to 1 level lower than when she was preop and we should avoid it at this time.  She needs to recuperate completely from her stroke.  Her neurologist at told what ever she gets after 1 year in of therapy is there is what she is going to get.  She is going to physical therapy at the [facility] and they want something about her knee if they can strengthen that.  She does have a brace for her back that she wears but not for her knee.  She uses a walker to get around.  She is here with her granddaughter and we discussed natural products.  She knows she cannot take NSAIDs because she is on Plavix and will injure her stomach and history of GERD the.  She needs to take natural with stuff.  In the future I did tell her steroid might be the only option for her in may end up being prednisone pills when time comes in the future   She denies loss of bowel bladder control she is alert today and oriented and answering questions appropriately  Granddaughter is here with her helping.      05/19/2023   Severe right knee pain   Scratched by a dog on the left calf that is painful   Patient stated that the right knee Depo-Medrol injection given 03/24/2023 wore out 2 weeks ago.  She is willing to proceed with viscosupplementation since we are avoiding having surgery at all cost.  She stated the dog scratched her and ran into her and caused some bruising and worried about an area  that seems to be hard and inflamed.  She is using her Rollator to get around.  She is not a surgical candidate due to medical issues.  She can not take NSAIDs she is on Plavix and history of GERD   She is here with granddaughter very pleasant alert oriented telling jokes.    07/06/2023   Severe right knee arthritis  Cellulitis by a dog scratch which we treated last time with antibiotics and that is completely healed  The severe right knee arthritis we can do much said trying different injections and she is going to physical therapy who they recommended maybe bracing.  I did tell her skin is too thin and the brace might rub and create an issue.  She is using a walker to walk around she does see pain management including doctors clar[facility]  Complaining of severe pain to the back which I told her I can not help her with in her pain is 8/10.  The knee nenita and subluxes and she has pain throughout most pronounced right now on the anterolateral aspect where she points at.  I reviewed her medications and her medical conditions with her and her granddaughter  No fever no chills no shortness of breath no difficulty with chewing swallowing loss of bowel bladder control or blurry vision double vision loss sense smell or taste  Past Medical History:   Diagnosis Date    Arthritis     Cataract     GERD (gastroesophageal reflux disease)     Heart attack 05/18/2022    Heart attack 05/23/2022    Hyperlipidemia     Hypertension     Stroke      Past Surgical History:   Procedure Laterality Date    ADENOIDECTOMY      ADRENAL GLAND SURGERY      APPENDECTOMY      BACK SURGERY      fusion l 4-5 s 1,2,3  fusion l 2-3    CORONARY ANGIOGRAPHY N/A 05/20/2022    Procedure: ANGIOGRAM, CORONARY ARTERY;  Surgeon: Domingo Martins MD;  Location[facility] C[facility] LAB;  Service: Cardiology;  Laterality: N/A;    CORONARY ANGIOGRAPHY N/A 05/24/2022    Procedure: ANGIOGRAM, CORONARY ARTERY;  Surgeon: Domingo Martins MD;   Location: [facility] CATH LAB;  Service: Cardiology;  Laterality: N/A;    EYE SURGERY      HEMORRHOID SURGERY      HERNIA REPAIR      HYSTERECTOMY      partial    indirect lumbar decompression      percutaneous placement of interspinous extension blocker    LEFT HEART CATHETERIZATION Left 05/20/2022    Procedure: CATHETERIZATION, HEART, LEFT;  Surgeon: Domingo Martins MD;  Location: [facility] CATH LAB;  Service: Cardiology;  Laterality: Left;    TONSILLECTOMY       Family History   Problem Relation Age of Onset    Heart disease Mother     Hypertension Mother     Diabetes Mother     Hypertension Father     Kidney disease Father     Breast cancer Sister      Social History     Socioeconomic History    Marital status:    Tobacco Use    Smoking status: Never    Smokeless tobacco: Never   Substance and Sexual Activity    Alcohol use: No    Drug use: No    Sexual activity: Not Currently     Medication List with Changes/Refills   Current Medications    AMOXICILLIN-CLAVULANATE 875-125MG (AUGMENTIN) 875-125 MG PER TABLET    Take 1 tablet by mouth 2 (two) times daily.    ASPIRIN (ECOTRIN) 81 MG EC TABLET    Take 1 tablet (81 mg total) by mouth once daily.    ATORVASTATIN (LIPITOR) 80 MG TABLET    Take 0.5 tablets (40 mg total) by mouth every morning.    CLOPIDOGREL (PLAVIX) 75 MG TABLET    Take 1 tablet (75 mg total) by mouth once daily. for 21 days    DULOXETINE (CYMBALTA) 30 MG CAPSULE    Take 30 mg by mouth once daily.    FUROSEMIDE (LASIX) 20 MG TABLET    Take 1 tablet (20 mg total) by mouth once daily.    LOSARTAN (COZAAR) 50 MG TABLET    Take 1 tablet (50 mg total) by mouth 2 (two) times a day.    METOPROLOL SUCCINATE (TOPROL-XL) 25 MG 24 HR TABLET    Take 1 tablet (25 mg total) by mouth once daily.    NIFEDIPINE (ADALAT CC) 30 MG TBSR    Take 1 tablet (30 mg total) by mouth once daily.    NUCYNTA 50 MG TAB    Take 1 tablet (50 mg total) by mouth every 8 (eight) hours as needed (severe pain). RESTART WHEN OK PER PAIN  MANAGEMENT PROVIDER    TIZANIDINE (ZANAFLEX) 4 MG TABLET         Review of patient's allergies indicates:   Allergen Reactions    Acetaminophen     Amitriptyline     Hydrochlorothiazide      Causes muscle cramping    Lisinopril      hyperkalemia    Oxycodone     Percocet [oxycodone-acetaminophen] Other (See Comments)     Seizures    Belbuca [buprenorphine hcl] Nausea And Vomiting and Other (See Comments)     Black out     Codeine Nausea Only and Rash    Prazosin Other (See Comments)     dizziness     Review of Systems   Constitutional: Negative for decreased appetite.   HENT:  Negative for tinnitus.    Eyes:  Negative for double vision.   Cardiovascular:  Negative for chest pain.   Respiratory:  Negative for wheezing.    Hematologic/Lymphatic: Negative for bleeding problem.   Skin:  Negative for dry skin.   Musculoskeletal:  Positive for arthritis, back pain, joint pain and muscle weakness. Negative for gout, neck pain and stiffness.   Gastrointestinal:  Negative for abdominal pain.   Genitourinary:  Negative for bladder incontinence.   Neurological:  Negative for numbness, paresthesias and sensory change.   Psychiatric/Behavioral:  Negative for altered mental status.      Objective:   Body mass index is 25.13 kg/m².  There were no vitals filed for this visit.       General    Constitutional: She is oriented to person, place, and time. She appears well-developed.   HENT:   Head: Atraumatic.   Eyes: EOM are normal.   Pulmonary/Chest: Effort normal.   Neurological: She is alert and oriented to person, place, and time.   Psychiatric: Judgment normal.         Ambulating well with a walker very steady  In the sitting position her pelvis is level  Bilateral hips passive motion without pain in the groin.    Hip flexors and abductors and adductors are slightly weak at 5-/5   Right knee with mild to moderate swelling.  There is no warmness to touch.  There is crepitus with motion.  She has 5-120 degrees and range of motion.   No defect in the patella or quadriceps tendon.  There is weakness around 4/5 in the quads and hamstrings.  Pain is throughout the knee joint .  Slightly loose lateral collateral to valgus and varus stressing with subluxation and clicking felt today  Left knee mild degeneration and mild tenderness.  There is no swelling.  Good motion 0-125.  No defect in the patella or quadriceps tendon   Left calf with scratches on the medial aspect proximal 3rd.  Resolved .  There is erythema and induration around 2 by 3 cm where the scratches are.  There is ecchymosis around the area also resolved.  Calves are soft bilaterally and nontender  There is multiple varicosities in the lower extremity  Skin over the knees within normal limits no obvious lesions    Relevant imaging results reviewed and interpreted by me, discussed with the patient and / or family today     X-ray 11/10/2022 of the hips within normal limits no evidence of arthritis  X-ray of the knees I 11/10/2022 on the right side showing there is a suprapatellar hematoma there is marginal osteophytic changes there is medial subluxation of the femur on the tibia consistent with severe arthritic change.  Assessment:     Encounter Diagnoses   Name Primary?    History of lumbar fusion     Recurrent right knee instability     Cellulitis of left lower extremity     Arthritis of knee, right Yes        Plan:   Arthritis of knee, right  -     Large Joint Aspiration/Injection: R knee    History of lumbar fusion    Recurrent right knee instability    Cellulitis of left lower extremity         Patient Instructions   Your right knee subluxes because of the severe arthritis   No brace will hold this in place it will give you severe pain around the knee because you skin is so thin and you will break out  The cellulitis in the left leg all cleared up with antibiotics  You already tried the gel injection on 05/19/2023-Synvisc as well as Depo-Medrol twice prior to that without much success    I will try Kenalog 80 mg/try and Kenalog injection with 5 cc 1% lidocaine into the right knee to see if this different steroid might help  If does not help there will be 1 long-acting steroid injection that we can give it is called Zilretta but we need to try other steroid and see how that works out 1st  Tylenol is her friend not to exceed 650 mg 3 times a day  You can not take arthritis medications you are on blood thinners  Return to see me in 3 months        Disclaimer: This note was prepared using a voice recognition system and is likely to have sound alike errors within the text.

## 2023-07-06 NOTE — PROCEDURES
Large Joint Aspiration/Injection: R knee    Date/Time: 7/6/2023 2:00 PM  Performed by: Luis Ibarra MD  Authorized by: Luis Ibarra MD     Consent Done?:  Yes (Verbal)  Indications:  Arthritis  Site marked: the procedure site was marked    Timeout: prior to procedure the correct patient, procedure, and site was verified      Local anesthesia used?: Yes    Local anesthetic:  Lidocaine 1% without epinephrine    Details:  Needle Size:  22 G  Ultrasonic Guidance for needle placement?: No    Approach:  Anterolateral  Location:  Knee  Site:  R knee  Medications:  80 mg triamcinolone acetonide 40 mg/mL  Patient tolerance:  Patient tolerated the procedure well with no immediate complications

## 2023-07-10 ENCOUNTER — CLINICAL SUPPORT (OUTPATIENT)
Dept: REHABILITATION | Facility: HOSPITAL | Age: 83
End: 2023-07-10
Payer: MEDICARE

## 2023-07-10 DIAGNOSIS — R29.898 DECREASED GRIP STRENGTH OF LEFT HAND: ICD-10-CM

## 2023-07-10 DIAGNOSIS — Z74.09 IMPAIRED FUNCTIONAL MOBILITY, BALANCE, GAIT, AND ENDURANCE: Primary | ICD-10-CM

## 2023-07-10 DIAGNOSIS — Z78.9 DECREASED ACTIVITIES OF DAILY LIVING (ADL): ICD-10-CM

## 2023-07-10 DIAGNOSIS — M25.60 RANGE OF MOTION DEFICIT: Primary | ICD-10-CM

## 2023-07-10 DIAGNOSIS — R29.818 FINE MOTOR IMPAIRMENT: ICD-10-CM

## 2023-07-10 DIAGNOSIS — R29.898 FINE MOTOR IMPAIRMENT: ICD-10-CM

## 2023-07-10 DIAGNOSIS — M62.81 DECREASED MUSCLE STRENGTH: ICD-10-CM

## 2023-07-10 PROCEDURE — 97110 THERAPEUTIC EXERCISES: CPT

## 2023-07-10 PROCEDURE — 97112 NEUROMUSCULAR REEDUCATION: CPT

## 2023-07-10 PROCEDURE — 97530 THERAPEUTIC ACTIVITIES: CPT

## 2023-07-10 NOTE — PROGRESS NOTES
[facility]   Physical Therapy Treatment Note + UPOC    Name: Leslie CASTELLON [facility] Number: [id number]    Therapy Diagnosis:   Encounter Diagnosis   Name Primary?    Impaired functional mobility, balance, gait, and endurance Yes     Physician: Sepideh Peters,*    Visit Date: 7/10/2023    Physician Orders: PT Eval and Treat   Medical Diagnosis from Referral: R29.6 (ICD-10-CM) - Falls frequently  Evaluation Date: 2/27/2023  Authorization Period Expiration: 2/17/24  Plan of Care Expiration: 4/28/23  Progress Note Due: 5/6/23  Visit # / Visits authorized: 19/20 (+eval)  FOTO: 0/3 not appropriate due to poor insight and cognitive deficits     Precautions: Standard and Fall     PTA Visit #: 0/5     Time In: 0915  Time Out: 1015  Total Billable Time: 60 minutes    SUBJECTIVE     Pt reports: Patient reports that lower back pain is decreased.   She reports that she received injection from Dr. Quintero and has experienced significant pain relief at the right knee also.  Patient's granddaughter   Response to previous treatment: She responded well to last session.   Functional change: She reports no functional change since last session.     Pain: 3/10  Location: right knee and lower back    OBJECTIVE     Objective Measures updated at progress report unless specified.     Update:   Functional Assessments:  Test Eval Score 3/30/23 4/27/23 7/10   TUG 15.35 seconds 13.98 seconds NT due to patient with wedged heel shoes today 22.57 w/o walker  15.26 w/ walker   30 s sit to stand    7 reps   5 x Sit to Stand 23.55 seconds 14.14 seconds 12.45 seconds 25s   4 Square Step Test 26 seconds NT NT due to patient with wedged heel shoes today            Patient reports a score of 46/56 on the Encarnacion Balance Assessment on 4/27/23.              Treatment     Leslie received the treatments listed below:      THERAPEUTIC EXERCISES to develop strength, endurance, ROM, flexibility, posture, and core stabilization  for (60) minutes including:    Intervention Performed Today    Supine Stretches       x - 3x30 seconds bilateral for each:  - lower trunk rotation   - single knee to chest  - hamstring/ heelcord   Seated Stretches  - 3x30 seconds bilateral for each:  - hamstring/ heelcord  - hip adductor   Standing Stretches  - 3x15 seconds bilateral for each:  - IT band   - heelcord    Seated lower extremity exercises X              X  x - hip flexion 2x10 bilateral   - hamstring curl 2x10 bilateral   - dorsiflexion/ plantarflexion 2x10 bilateral with green band  - eversion 1x10 bilateral   - toe curls and flares 1x20 bilateral   - hip adduction against ball 1x10 with 3 second hold  - hip abduction against belt 1x10 with 3 second hold  Clam shells red theraband sitting - 2x10  Long arc quads - 4# - 10 reps switching x 3 minutes   Standing lower extremity exercises        x - hip flexion 2x10 bilateral   - hip abduction 2x10 bilateral   - hip extension 2x10 bilateral   - heel raise 2x10    NuStep for endurance x - Level 2, 10 minutes   Bicycle for endurance and ROM  - Level 1 x 6 minutes   Supine x - Quad set with short arc quad 2x10 with 3 second hold   - dorsiflexion against green band 2x10 bilateral    Lower Trunk Rotations x 10 second holds x 10   Pelvic tilts x 10 second holds x 10   Bridges  x 2x10   Abdominal Bracing x Knee to chest - 1x10 bilateral    Straight leg raise   - 1x10 bilateral    Sitting      Neoprene wrap to Right knee for all exercises    neuromuscular re-education activities to improve: Balance, Coordination, Kinesthetic, Sense, Proprioception, and Posture for (0) minutes. The following activities were included:    Intervention Performed Today    Air Ex Beam        - Tandem holds 5 seconds, 3x's   - Tandem walking forward/backward 3x's each  - Side stepping 3x's each direction  - tandem walking over hurdles forward and side stepping 2x's each  - heel lifts and toe lifts off of beam with 2-0 hand supports   -  single leg standing while tapping opposite foot forward and back on beam   Orange Rubber Beam    - Tandem holds 3 seconds, 3x's bilateral   - Tandem walking forward 4x's   - Side stepping 2x's each direction  - single leg standing while tapping opposite foot forward and back on beam   Air Ex Pad              - Weight shifts Left to Right 2x10  - Weight shifts forward/back 2x10  - single leg standing holds   - Eyes closed 10 seconds  - backward pivot steps with 1 hand support 1x8 bilateral   - standing on pad while toss/catching ball to wall 1x8  - One lower extremity on pad and opposite lower extremity on step tossing ball to wall    Hand paddled per Neuro LEONIE technique and for entirety of session     Core Exercises in supine  Bilateral lower extremities over Tball for each:   - lower trunk rotation 2x10  - bilateral knees to chest 2x10  - posterior pelvic tilt to bridge 2x10   Parallel bars      - walking backward 4x8 ft with 2-1 hand support  - braided walking 4x's length of bars  - narrow base standing without hand support performing head turns/nods 1x5 each, arm reach/lifts 1x5 each   Trunk extension for posture  - 2x10 over pillow and ball at low back    Sit to squat without hand support  2x10 from progressively lowered mat   Step ups  6 inch step  - 2 sets of 1 minute on each leg   Functional Re-Tests  - see above   Encarnacion Balance Scale  - see above   Agility Drills on Turf  - side stepping 2x20 ft  - backward walking 2x20 ft - without shoes  - tandem walking 2x15 ft - without shoes       THERAPEUTIC ACTIVITIES to improve dynamic and functional performance for () minutes including:    Intervention Performed Today    Patient and her granddaughter educated on Home Exercise Program for balance.  Also educated on wearing tennis shoes to future sessions.  Discussed the process of changing schedule to get her seen by an ortho PT 1 day/week then by me 1day/week  - Patient and granddaughter verbalized good  understanding of education provided.                                              MANUAL THERAPY TECHNIQUES were applied for (30) minutes, including:    Manual Intervention Performed Today    Soft Tissue Mobilization x Bilateral thoracolumbar spine   Joint Mobilizations [x] PIVM lumbar spine    []     []    Functional Dry Needling  [x] Bilateral thoracolumbar spine     Plan for Next Visit: Continue as needed         Gait Training: to improve functional mobility and safety for  () minutes, including:  - working on increasing step length and upright posture with gait sequence, ambulating 200ft and 100ft with contact guard assist for safety awareness.     Patient Education and Home Exercises     Home Exercises Provided and Patient Education Provided     Education provided:   - 3/14/23 Patient educated on Home Exercise Program for balance   - 3/16/23 Patient encouraged to stay compliant with Home Exercise Program   - 4/27/23 discussed continuing with James PT for dry needling 1 day/week for the next 4 weeks.   Written Home Exercises Provided: yes. Exercises were reviewed and Leslie was able to demonstrate them prior to the end of the session.  Leslie verbalized good  understanding of the education provided. See EMR under Patient Instructions for exercises provided during therapy sessions    ASSESSMENT     Significant pain relief at the right knee with injection.  Patient reports that lower back pain is still significantly decreased.  Continued working on lower extremity strengthening.  Spoke with patient's granddaughter about participation in [facility] exercise group a few times per week.  Her granddaughter reports that she will follow up on that.  At this time, the patient does experience relief with recent injection.  She appears nearing completion of PT services.  Will soon discharge to home exercise regimen in a seniors program.    Leslie Is progressing well towards her goals.   Pt prognosis is Good.     Pt will continue  to benefit from skilled outpatient physical therapy to address the deficits listed in the problem list box on initial evaluation, provide pt/family education and to maximize pt's level of independence in the home and community environment.     Pt's spiritual, cultural and educational needs considered and pt agreeable to plan of care and goals.     Anticipated barriers to physical therapy: co-morbidities, sedentary lifestyle, chronicity of condition, and lack of understanding of condition     GOALS:     Short Term Goals:  4 weeks Progress    Function: Patient will demonstrate improved function as indicated by a functional limitation score improved by 3 points from initial measure.  PC   Strength: Patient will demonstrate improved strength by performing 5x sit to  20 seconds in order to progress towards independence with functional activities.  Met   Balance: Complete Encarnacion Balance Scale and set goals accordingly.  Met   Coordination: Patient will demonstrate improved time of 13 sec on TUG to demonstrate improved coordination and safety with mobility. PC   HEP: Patient will demonstrate independence with HEP in order to progress toward functional independence.  Met   Determine the need of Custom Wheelchair Eval to improve patient's positioning and independence with pressure reliefs and independence with home and community mobility to decrease burden of care. D/C not needed at this time.              Long Term Goals:  8 weeks Progress   Function: Patient will demonstrate improved function as indicated by a functional limitation score improved 5 points from initial measure.  PC   Strength: Patient will demonstrate improved strength by performing 5x sit to  17 seconds in order to progress towards independence with functional activities.  Met   Patient will return to ADL's, IADL's, community involvement, recreational activities, and work-related activities with less than or equal to 5/10 pain and maximal  function.  PC   Balance: Improve Encarnacion Balance score to 50/56 to demonstrate improved balance and decreased risk of falls.  PC   Coordination: Patient will demonstrate improved time of 11 sec on TUG to demonstrate improved coordination and safety with mobility. PC   HEP: Patient educated on HEP in preparation for d/c verbalizing and demonstrating good understanding of topics discussed.   PC            Goals Key:  PC= progressing/continue;        PM= partially met;             DC= discontinue    PLAN     Continue Plan of Care (POC) and progress per patient tolerance. See Treatment section for exercise progression.    Leoncio Lujan, PT

## 2023-07-10 NOTE — PROGRESS NOTES
[facility]  Occupational Therapy Progress and Treatment Note     Date: 7/10/2023  Name: Leslie CASTELLON [facility] Number: [id number]    Therapy Diagnosis:   Encounter Diagnoses   Name Primary?    Range of motion deficit Yes    Decreased  strength of left hand     Decreased muscle strength     Decreased activities of daily living (ADL)     Fine motor impairment        Physician: Angeles Carson MD    Physician Orders: Occupational Therapy to Evaluate and Treat   Medical Diagnosis: R29.898 (ICD-10-CM) - Poor fine motor skills  Surgical Procedure and Date: N/A     Evaluation Date: 5/17/2023  Insurance Authorization Period Expiration: 6/5/2023 - 12/31/2023  Plan of Care Certification Period: 5/17/2023 - 10/17/2023  Progress Note Due: 8/10/2023      Date of Return to MD: N/A     Visit # / Visits authorized: 4/25 (5 Episode Visits)   FOTO: 1/3     Precautions:  Standard    Time In: 10:00  Time Out: 10:45  Total Treatment Time: 45 minutes  Total Billable Time: 45 minutes    Subjective     Patient reports: She is having less difficulty opening containers and playing with her dog    she was compliant with home exercise program given last session.   Response to previous treatment: Tolerated well  Functional change: Improved knowledge of HEP    Pain: 0/10  Location: bilateral hands     Objective        Joint Evaluation  AROM  5/17/2023 AROM  5/17/2023 PROM   5/17/2023 AROM  7/10/2023 Goal     Left Right Right  Right Active    Shoulder Flexion 0-180 Within normal limits  90 110 98  110   Shoulder Abduction 0-180  90 110  98 110   Shoulder External Rotation 0-90  25 40 30      Shoulder Internal Rotation 0-90  45 60  50     Shoulder Extension 0-60  10 25  13     Shoulder Horizontal Adduction 0-90  Within normal limits  Within normal limits        Elbow Flexion 0-150          Elbow Extension 0          Wrist Flexion 0-80          Wrist Extension 0-70          Wrist Supination 0-80          Wrist Pronation 0-80           Wrist Ulnar Deviation 0-30          Wrist Radial Deviation 0-20             Fist: normal        Strength 5/17/2023 5/17/2023 7/10/2023     **within available ROM** Left Right Left Goal Left   Shoulder Flexion 3+/5 3-/5 4-/5 4/5   Shoulder Abduction 3+/5 3-/5  4/5   Shoulder External Rotation  3+/5 3-/5  4/5   Shoulder Internal Rotation 3+/5 3-/5  4/5   Shoulder Extension 3+/5 3-/5  4/5   Shoulder Horizontal Adduction 3+/5 3-/5  4/5   Elbow Flexion 3+/5 3+/5  4/5   Elbow Extension 3+/5 3+/5  4/5   Wrist Flexion 3+/5 3+/5  4/5   Wrist Extension 3+/5 3+/5  4/5   Supination 3+/5 3+/5  4/5   Pronation 3+/5 3+/5  4/5   Ulnar Deviation 3+/5 3+/5  4/5   Radial Deviation 3+/5 3+/5  4/5       Strength: (MUNDO Dynamometer in lbs.) Average 3 trials, Position II:       5/17/2023 5/17/2023 7/10/2023 7/10/2023 Goal     Left Right Left Right Left   Rung II 25# 35# 30# 35# 31#      Pinch Strength (Measured in psi)       5/17/2023 5/17/2023 7/10/2023 7/10/2023     Left Right Left Right   2pt Pinch 6 psi 7 psi 7 7   3pt Pinch 6 psi 7 psi 8 8   Lateral Pinch 9 psi 12 psi 12 13      Coordination:   -     Fine Motor Coordination: impaired  -     Upper Extremity Coordination: intact  -     Lower Extremity Coordination: intact       Treatment     Supervised Modalities: Modalities such as hot/cold packs, traction, unattended electrical stimulation, paraffin bath, fluidotherapy to reduce pain and increase soft tissue extensibility. Leslie received (0) minutes of modalities listed below after being cleared for contraindications.  x = modalities performed     Supervised Modalities 7/10/2023                            Manual Therapy Techniques: Joint mobilizations, Soft tissue Mobilization, and mobilization with movement applied to the area listed below. Leslie received (0) minutes of interventions. x = intervention performed today    Manual Intervention 7/10/2023    Soft Tissue Mobilization     Joint Mobilizations     Mobilization with  movement              Therapeutic Exercises: to develop strength, endurance, ROM, flexibility, posture and core stabilization. Leslie received (25) minutes of exercises listed below. x = performed today * = level of progression of activity     Therapeutic Exercise 7/10/2023    Interossei  -green foam  -rubber bands x 20x bilateral    Digi flexion - yellow x 20x bilateral    Resistive pinch - yellow  -2pt  -3pt  -lateral x 20x bilateral    Thumb palmar   -adduction  -abduction x 20x bilateral    Thumb radial  -adduction  -abduction x 20x bilateral    Forearm exercise  - wrist flexion  -wrist extension  - supination  -pronation x 2lb        Therapeutic Activities: Everyday tasks to address the combined need of improved strength, range of motion, and endurance to achieve increased independence. While simulating these activities, the person is applying the gained skills of strength and improved range of motion in a practical way.  Leslie received (15) minutes of activities listed below. x = activities performed today * = level of progression of activity     Therapeutic Activities 7/10/2023    Velcro board  -lateral pinch  - dowel  x 20x bilateral    Opening and closing containers x 5 minutes, ergonomics               Neuromuscular Re-education: the use of functional strengthening, stretching, balancing and coordination activities that train the nerves and muscles to react and communicate to restore normal body movement patterns to increase self care independence. Leslie received (15) minutes of interventions listed below.  x = interventions performed today * = level of progression of activity     Neuromuscular Re-education 7/10/2023    Finger opposition  x 20x   Finger taps (extension) x 20x   Finger opposition with slides x    Table Top active assist range of motion  - shoulder flexion  - shoulder abduction  x 5x - 15 second holds     Self Care: Fundamental skills required to independently care for oneself. Activities of  daily living such as eating, bathing, dressing, toileting, grooming, sleeping, transfers and mobility. Instrumental activities of daily living such as driving, medication management, pet care, home management/cleaning, financial management, using the phone, meal preparation and shopping. Leslie received (0) minutes of interventions listed below.  x = interventions performed * = level of progression of activity     Self Care 7/10/2023                            Home Exercises and Education Provided     Education provided:   - home exercise program education provided  - progress towards goals     Written Home Exercises Provided: Patient instructed to cont prior HEP.  Exercises were reviewed and Leslie was able to demonstrate them prior to the end of the session. Leslie demonstrated good understanding of the home exercise program provided.     See EMR under Patient Instructions for exercises provided  5/17/2023 .        Assessment     Patient tolerated exercises well. Needs verbal cues for hand placement.     Leslie is progressing towards her goals and there are no updates to goals at this time. Patient prognosis is Good.     Patient will continue to benefit from skilled outpatient occupational therapy to address the deficits listed in the problem list on initial evaluation provide patient/family education and to maximize patient's level of independence in the home and community environment.     Patient's spiritual, cultural and educational needs considered and patient agreeable to plan of care and goals.    Anticipated barriers to occupational therapy: Cognition, home exercise program compliance    Goals:     Short Term Goals:  6 weeks  Progress    Pain: Patient will demonstrate improved pain by reports of less than or equal to 7/10 worst pain on the verbal rating scale in order to progress toward maximal functional ability and improve quality of life. PC   Function: Patient will demonstrate improved function as indicated by  a functional limitation score of less than or equal to 36 out of 100 on FOTO. PC   Mobility: Patient will improve active range of motion to 50% of stated goals, listed in objective measures above, in order to progress towards independence with functional activities.  PC   Strength: Patient will improve strength to 50% of stated goals, listed in objective measures above, in order to progress towards independence with functional activities.  PC   HEP: Patient will demonstrate independence with home exercise program in order to progress toward functional independence. PC      Long Term Goals:  12 weeks  Progress   Pain: Patient will demonstrate improved pain by reports of less than or equal to 6/10 worst pain on the verbal rating scale in order to progress toward maximal functional ability and improve quality of life.   PC   Function: Patient will demonstrate improved function as indicated by a functional limitation score of less than or equal to 36 out of 100 on FOTO. PC   Mobility: Patient will improve active range of motion to stated goals, listed in objective measures above, in order to return to maximal functional potential and improve quality of life. PC   Strength: Patient will improve strength to stated goals, listed in objective measures above, in order to improve functional independence and quality of life. PC   Patient will return to normal ADL's, IADL's, community involvement, recreational activities, and work-related activities with less than or equal to 5/10 pain and maximal function.  PC      Goals Key:  PC= Progressing/Continue;       PM= Partially Met;       GM= Goal Met,      DC= Discontinue    Plan     Continue Plan of Care (POC) and progress per patient tolerance.      Ivana Gallo, OT  ,OTD, OTR/L

## 2023-07-10 NOTE — PROGRESS NOTES
Subjective:       Patient ID: Leslie Wood is a 83 y.o. female.    Chief Complaint: Breast Problem (Pt . States she has noticed changes in breast recently; would like to get mammogram)    HPI        Past Medical History:   Diagnosis Date    Arthritis     Cataract     GERD (gastroesophageal reflux disease)     Heart attack 05/18/2022    Heart attack 05/23/2022    Hyperlipidemia     Hypertension     Stroke      Past Surgical History:   Procedure Laterality Date    ADENOIDECTOMY      ADRENAL GLAND SURGERY      APPENDECTOMY      BACK SURGERY      fusion l 4-5 s 1,2,3  fusion l 2-3    CORONARY ANGIOGRAPHY N/A 05/20/2022    Procedure: ANGIOGRAM, CORONARY ARTERY;  Surgeon: Domingo Martins MD;  Location: [facility] CATH LAB;  Service: Cardiology;  Laterality: N/A;    CORONARY ANGIOGRAPHY N/A 05/24/2022    Procedure: ANGIOGRAM, CORONARY ARTERY;  Surgeon: Domingo Martins MD;  Location: [facility] CATH LAB;  Service: Cardiology;  Laterality: N/A;    EYE SURGERY      HEMORRHOID SURGERY      HERNIA REPAIR      HYSTERECTOMY      partial    indirect lumbar decompression      percutaneous placement of interspinous extension blocker    LEFT HEART CATHETERIZATION Left 05/20/2022    Procedure: CATHETERIZATION, HEART, LEFT;  Surgeon: Domingo Martins MD;  Location: [facility] CATH LAB;  Service: Cardiology;  Laterality: Left;    TONSILLECTOMY       Social History     Socioeconomic History    Marital status:    Tobacco Use    Smoking status: Never    Smokeless tobacco: Never   Substance and Sexual Activity    Alcohol use: No    Drug use: No    Sexual activity: Not Currently     Review of patient's allergies indicates:   Allergen Reactions    Acetaminophen     Amitriptyline     Hydrochlorothiazide      Causes muscle cramping    Lisinopril      hyperkalemia    Oxycodone     Percocet [oxycodone-acetaminophen] Other (See Comments)     Seizures    Belbuca [buprenorphine hcl] Nausea And Vomiting and Other (See Comments)     Black out      Codeine Nausea Only and Rash    Prazosin Other (See Comments)     dizziness     Current Outpatient Medications   Medication Sig    aspirin (ECOTRIN) 81 MG EC tablet Take 1 tablet (81 mg total) by mouth once daily.    atorvastatin (LIPITOR) 80 MG tablet Take 0.5 tablets (40 mg total) by mouth every morning.    DULoxetine (CYMBALTA) 30 MG capsule Take 30 mg by mouth once daily.    furosemide (LASIX) 20 MG tablet Take 1 tablet (20 mg total) by mouth once daily.    losartan (COZAAR) 50 MG tablet Take 1 tablet (50 mg total) by mouth 2 (two) times a day.    metoprolol succinate (TOPROL-XL) 25 MG 24 hr tablet Take 1 tablet (25 mg total) by mouth once daily.    NUCYNTA 50 mg Tab Take 1 tablet (50 mg total) by mouth every 8 (eight) hours as needed (severe pain). RESTART WHEN OK PER PAIN MANAGEMENT PROVIDER    tiZANidine (ZANAFLEX) 4 MG tablet     amoxicillin-clavulanate 875-125mg (AUGMENTIN) 875-125 mg per tablet Take 1 tablet by mouth 2 (two) times daily. (Patient not taking: Reported on 7/3/2023)    clopidogreL (PLAVIX) 75 mg tablet Take 1 tablet (75 mg total) by mouth once daily. for 21 days    NIFEdipine (ADALAT CC) 30 MG TbSR Take 1 tablet (30 mg total) by mouth once daily.     No current facility-administered medications for this visit.           Review of Systems   Skin:         See hpi     Objective:      Physical Exam  Vitals reviewed.   Neurological:      Mental Status: She is alert.       Assessment:     Vitals:    07/03/23 1129   BP: 112/62   Pulse: 96   Temp: 98.3 °F (36.8 °C)         1. Breast pain        Plan:   Breast pain  -     Mammo Digital Diagnostic Bilat with Juan Ramon; Future; Expected date: 07/03/2023  -     Cancel: US Breast Bilateral Limited; Future; Expected date: 07/03/2023    She is concerned about change in shape/size of her breasts. No pain

## 2023-07-11 NOTE — PLAN OF CARE
[facility]   Physical Therapy Treatment Note + UPOC    Name: Leslie CASTELLON [facility] Number: [id number]    Therapy Diagnosis:   Encounter Diagnosis   Name Primary?    Impaired functional mobility, balance, gait, and endurance Yes     Physician: Sepideh Peters,*    Visit Date: 7/10/2023    Physician Orders: PT Eval and Treat   Medical Diagnosis from Referral: R29.6 (ICD-10-CM) - Falls frequently  Evaluation Date: 2/27/2023  Authorization Period Expiration: 2/17/24  Plan of Care Expiration: 4/28/23  Progress Note Due: 5/6/23  Visit # / Visits authorized: 19/20 (+eval)  FOTO: 0/3 not appropriate due to poor insight and cognitive deficits     Precautions: Standard and Fall     PTA Visit #: 0/5     Time In: 0915  Time Out: 1015  Total Billable Time: 60 minutes    SUBJECTIVE     Pt reports: Patient reports that lower back pain is decreased.   She reports that she received injection from Dr. Quintero and has experienced significant pain relief at the right knee also.  Patient's granddaughter   Response to previous treatment: She responded well to last session.   Functional change: She reports no functional change since last session.     Pain: 3/10  Location: right knee and lower back    OBJECTIVE     Objective Measures updated at progress report unless specified.     Update:   Functional Assessments:  Test Eval Score 3/30/23 4/27/23 7/10   TUG 15.35 seconds 13.98 seconds NT due to patient with wedged heel shoes today 22.57 w/o walker  15.26 w/ walker   30 s sit to stand    7 reps   5 x Sit to Stand 23.55 seconds 14.14 seconds 12.45 seconds 25s   4 Square Step Test 26 seconds NT NT due to patient with wedged heel shoes today            Patient reports a score of 46/56 on the Encarnacion Balance Assessment on 4/27/23.              Treatment     Leslie received the treatments listed below:      THERAPEUTIC EXERCISES to develop strength, endurance, ROM, flexibility, posture, and core stabilization  for (60) minutes including:    Intervention Performed Today    Supine Stretches       x - 3x30 seconds bilateral for each:  - lower trunk rotation   - single knee to chest  - hamstring/ heelcord   Seated Stretches  - 3x30 seconds bilateral for each:  - hamstring/ heelcord  - hip adductor   Standing Stretches  - 3x15 seconds bilateral for each:  - IT band   - heelcord    Seated lower extremity exercises X              X  x - hip flexion 2x10 bilateral   - hamstring curl 2x10 bilateral   - dorsiflexion/ plantarflexion 2x10 bilateral with green band  - eversion 1x10 bilateral   - toe curls and flares 1x20 bilateral   - hip adduction against ball 1x10 with 3 second hold  - hip abduction against belt 1x10 with 3 second hold  Clam shells red theraband sitting - 2x10  Long arc quads - 4# - 10 reps switching x 3 minutes   Standing lower extremity exercises        x - hip flexion 2x10 bilateral   - hip abduction 2x10 bilateral   - hip extension 2x10 bilateral   - heel raise 2x10    NuStep for endurance x - Level 2, 10 minutes   Bicycle for endurance and ROM  - Level 1 x 6 minutes   Supine x - Quad set with short arc quad 2x10 with 3 second hold   - dorsiflexion against green band 2x10 bilateral    Lower Trunk Rotations x 10 second holds x 10   Pelvic tilts x 10 second holds x 10   Bridges  x 2x10   Abdominal Bracing x Knee to chest - 1x10 bilateral    Straight leg raise   - 1x10 bilateral    Sitting      Neoprene wrap to Right knee for all exercises    neuromuscular re-education activities to improve: Balance, Coordination, Kinesthetic, Sense, Proprioception, and Posture for (0) minutes. The following activities were included:    Intervention Performed Today    Air Ex Beam        - Tandem holds 5 seconds, 3x's   - Tandem walking forward/backward 3x's each  - Side stepping 3x's each direction  - tandem walking over hurdles forward and side stepping 2x's each  - heel lifts and toe lifts off of beam with 2-0 hand supports   -  single leg standing while tapping opposite foot forward and back on beam   Orange Rubber Beam    - Tandem holds 3 seconds, 3x's bilateral   - Tandem walking forward 4x's   - Side stepping 2x's each direction  - single leg standing while tapping opposite foot forward and back on beam   Air Ex Pad              - Weight shifts Left to Right 2x10  - Weight shifts forward/back 2x10  - single leg standing holds   - Eyes closed 10 seconds  - backward pivot steps with 1 hand support 1x8 bilateral   - standing on pad while toss/catching ball to wall 1x8  - One lower extremity on pad and opposite lower extremity on step tossing ball to wall    Hand paddled per Neuro LEONIE technique and for entirety of session     Core Exercises in supine  Bilateral lower extremities over Tball for each:   - lower trunk rotation 2x10  - bilateral knees to chest 2x10  - posterior pelvic tilt to bridge 2x10   Parallel bars      - walking backward 4x8 ft with 2-1 hand support  - braided walking 4x's length of bars  - narrow base standing without hand support performing head turns/nods 1x5 each, arm reach/lifts 1x5 each   Trunk extension for posture  - 2x10 over pillow and ball at low back    Sit to squat without hand support  2x10 from progressively lowered mat   Step ups  6 inch step  - 2 sets of 1 minute on each leg   Functional Re-Tests  - see above   Encarnacion Balance Scale  - see above   Agility Drills on Turf  - side stepping 2x20 ft  - backward walking 2x20 ft - without shoes  - tandem walking 2x15 ft - without shoes       THERAPEUTIC ACTIVITIES to improve dynamic and functional performance for () minutes including:    Intervention Performed Today    Patient and her granddaughter educated on Home Exercise Program for balance.  Also educated on wearing tennis shoes to future sessions.  Discussed the process of changing schedule to get her seen by an ortho PT 1 day/week then by me 1day/week  - Patient and granddaughter verbalized good  understanding of education provided.                                              MANUAL THERAPY TECHNIQUES were applied for (30) minutes, including:    Manual Intervention Performed Today    Soft Tissue Mobilization x Bilateral thoracolumbar spine   Joint Mobilizations [x] PIVM lumbar spine    []     []    Functional Dry Needling  [x] Bilateral thoracolumbar spine     Plan for Next Visit: Continue as needed         Gait Training: to improve functional mobility and safety for  () minutes, including:  - working on increasing step length and upright posture with gait sequence, ambulating 200ft and 100ft with contact guard assist for safety awareness.     Patient Education and Home Exercises     Home Exercises Provided and Patient Education Provided     Education provided:   - 3/14/23 Patient educated on Home Exercise Program for balance   - 3/16/23 Patient encouraged to stay compliant with Home Exercise Program   - 4/27/23 discussed continuing with James PT for dry needling 1 day/week for the next 4 weeks.   Written Home Exercises Provided: yes. Exercises were reviewed and Leslie was able to demonstrate them prior to the end of the session.  Leslie verbalized good  understanding of the education provided. See EMR under Patient Instructions for exercises provided during therapy sessions    ASSESSMENT     Significant pain relief at the right knee with injection.  Patient reports that lower back pain is still significantly decreased.  Continued working on lower extremity strengthening.  Spoke with patient's granddaughter about participation in [facility] exercise group a few times per week.  Her granddaughter reports that she will follow up on that.  At this time, the patient does experience relief with recent injection.  She appears nearing completion of PT services.  Will soon discharge to home exercise regimen in a seniors program.    Leslie Is progressing well towards her goals.   Pt prognosis is Good.     Pt will continue  to benefit from skilled outpatient physical therapy to address the deficits listed in the problem list box on initial evaluation, provide pt/family education and to maximize pt's level of independence in the home and community environment.     Pt's spiritual, cultural and educational needs considered and pt agreeable to plan of care and goals.     Anticipated barriers to physical therapy: co-morbidities, sedentary lifestyle, chronicity of condition, and lack of understanding of condition     GOALS:     Short Term Goals:  4 weeks Progress    Function: Patient will demonstrate improved function as indicated by a functional limitation score improved by 3 points from initial measure.  PC   Strength: Patient will demonstrate improved strength by performing 5x sit to  20 seconds in order to progress towards independence with functional activities.  Met   Balance: Complete Encarnacion Balance Scale and set goals accordingly.  Met   Coordination: Patient will demonstrate improved time of 13 sec on TUG to demonstrate improved coordination and safety with mobility. PC   HEP: Patient will demonstrate independence with HEP in order to progress toward functional independence.  Met   Determine the need of Custom Wheelchair Eval to improve patient's positioning and independence with pressure reliefs and independence with home and community mobility to decrease burden of care. D/C not needed at this time.              Long Term Goals:  8 weeks Progress   Function: Patient will demonstrate improved function as indicated by a functional limitation score improved 5 points from initial measure.  PC   Strength: Patient will demonstrate improved strength by performing 5x sit to  17 seconds in order to progress towards independence with functional activities.  Met   Patient will return to ADL's, IADL's, community involvement, recreational activities, and work-related activities with less than or equal to 5/10 pain and maximal  function.  PC   Balance: Improve Encarnacion Balance score to 50/56 to demonstrate improved balance and decreased risk of falls.  PC   Coordination: Patient will demonstrate improved time of 11 sec on TUG to demonstrate improved coordination and safety with mobility. PC   HEP: Patient educated on HEP in preparation for d/c verbalizing and demonstrating good understanding of topics discussed.   PC            Goals Key:  PC= progressing/continue;        PM= partially met;             DC= discontinue    PLAN     Continue Plan of Care (POC) and progress per patient tolerance. See Treatment section for exercise progression.    Leoncio Lujan, PT

## 2023-07-12 ENCOUNTER — OFFICE VISIT (OUTPATIENT)
Dept: NEUROLOGY | Facility: CLINIC | Age: 83
End: 2023-07-12
Payer: MEDICARE

## 2023-07-12 VITALS
SYSTOLIC BLOOD PRESSURE: 135 MMHG | WEIGHT: 132.94 LBS | DIASTOLIC BLOOD PRESSURE: 83 MMHG | HEART RATE: 67 BPM | RESPIRATION RATE: 16 BRPM | HEIGHT: 61 IN | BODY MASS INDEX: 25.1 KG/M2

## 2023-07-12 DIAGNOSIS — I69.30 LATE EFFECT OF CEREBROVASCULAR ACCIDENT (CVA): ICD-10-CM

## 2023-07-12 DIAGNOSIS — Z82.0 FAMILY HISTORY OF PARKINSON DISEASE: ICD-10-CM

## 2023-07-12 DIAGNOSIS — I69.354 HEMIPLEGIA AND HEMIPARESIS FOLLOWING CEREBRAL INFARCTION AFFECTING LEFT NON-DOMINANT SIDE: ICD-10-CM

## 2023-07-12 DIAGNOSIS — M85.89 OSTEOPENIA OF MULTIPLE SITES: ICD-10-CM

## 2023-07-12 DIAGNOSIS — H53.462 HEMIANOPIA OF LEFT EYE: ICD-10-CM

## 2023-07-12 DIAGNOSIS — R25.1 TREMORS OF NERVOUS SYSTEM: Primary | ICD-10-CM

## 2023-07-12 PROCEDURE — 99999 PR PBB SHADOW E&M-EST. PATIENT-LVL V: ICD-10-PCS | Mod: PBBFAC,,, | Performed by: NURSE PRACTITIONER

## 2023-07-12 PROCEDURE — 99215 OFFICE O/P EST HI 40 MIN: CPT | Mod: PBBFAC | Performed by: NURSE PRACTITIONER

## 2023-07-12 PROCEDURE — 99999 PR PBB SHADOW E&M-EST. PATIENT-LVL V: CPT | Mod: PBBFAC,,, | Performed by: NURSE PRACTITIONER

## 2023-07-12 PROCEDURE — 99215 OFFICE O/P EST HI 40 MIN: CPT | Mod: S$PBB,,, | Performed by: NURSE PRACTITIONER

## 2023-07-12 PROCEDURE — 99215 PR OFFICE/OUTPT VISIT, EST, LEVL V, 40-54 MIN: ICD-10-PCS | Mod: S$PBB,,, | Performed by: NURSE PRACTITIONER

## 2023-07-12 NOTE — PROGRESS NOTES
Subjective:       Patient ID: Leslie Wood is a 83 y.o. female.    Chief Complaint: Tremors     Previous neurologist TRUONG Renae       HPI The patient is presenting with longstanding tremors that started 2017. The tremors are mainly in both arms symmetrically. The tremors do emerge during action like writing. Patient reports that the only time she notice them.  No rest tremors. No neck tremors. No leg tremors upon standing. No voice tremors. No muscle rigidity of muscle stiffness. Patient has postural instability due to DDD of spine. No memory loss or dementia. Cannot tell if alcohol improves the tremors because the patient does not drink. Anxiety and emotional stress exacerbates the tremor. No significant diurnal variation in the tremors.last fall 1 month ago no serious injury, eased to the floor. History of strokes had aphasia and slurred speech on stroke prevention medications. No head injury. No stimulants on board.  Chronic Back pain, and Knee pain has Pain stimulator, sees Pain management. MRI/MRA unable to be done d/t nerve stimulator that is not compatible with MRI.  Neurology  No new meds started recently. Multiple family members have similar tremors. Brother had Parkinsons disease. The patient drinks cups of caffeinated Tea drinks daily. Patient has concluded speech therapy, recently started OT/PT. Patient goes multiple times per week. Patient find if very helpful.         Review of Systems   Constitutional: Negative.    HENT:  Positive for hearing loss.    Eyes:  Positive for visual disturbance.   Endocrine: Negative.    Genitourinary: Negative.    Musculoskeletal:  Positive for arthralgias, back pain, gait problem, myalgias and neck pain.   Skin: Negative.    Allergic/Immunologic: Negative.    Neurological:  Positive for tremors.   Hematological: Negative.    Psychiatric/Behavioral:  Positive for sleep disturbance. Negative for agitation, behavioral problems, confusion, hallucinations, self-injury and  suicidal ideas. The patient is not nervous/anxious and is not hyperactive.                Current Outpatient Medications:     amoxicillin-clavulanate 875-125mg (AUGMENTIN) 875-125 mg per tablet, Take 1 tablet by mouth 2 (two) times daily., Disp: 14 tablet, Rfl: 0    aspirin (ECOTRIN) 81 MG EC tablet, Take 1 tablet (81 mg total) by mouth once daily., Disp: 90 tablet, Rfl: 3    atorvastatin (LIPITOR) 80 MG tablet, Take 0.5 tablets (40 mg total) by mouth every morning., Disp: 30 tablet, Rfl: 5    DULoxetine (CYMBALTA) 30 MG capsule, Take 30 mg by mouth once daily., Disp: , Rfl:     furosemide (LASIX) 20 MG tablet, Take 1 tablet (20 mg total) by mouth once daily., Disp: 30 tablet, Rfl: 11    losartan (COZAAR) 50 MG tablet, Take 1 tablet (50 mg total) by mouth 2 (two) times a day., Disp: 180 tablet, Rfl: 1    metoprolol succinate (TOPROL-XL) 25 MG 24 hr tablet, Take 1 tablet (25 mg total) by mouth once daily., Disp: 90 tablet, Rfl: 3    NUCYNTA 50 mg Tab, Take 1 tablet (50 mg total) by mouth every 8 (eight) hours as needed (severe pain). RESTART WHEN OK PER PAIN MANAGEMENT PROVIDER, Disp: 1 tablet, Rfl: 0    tiZANidine (ZANAFLEX) 4 MG tablet, , Disp: , Rfl:     clopidogreL (PLAVIX) 75 mg tablet, Take 1 tablet (75 mg total) by mouth once daily. for 21 days, Disp: 21 tablet, Rfl: 0    NIFEdipine (ADALAT CC) 30 MG TbSR, Take 1 tablet (30 mg total) by mouth once daily., Disp: 30 tablet, Rfl: 0  Past Medical History:   Diagnosis Date    Arthritis     Cataract     GERD (gastroesophageal reflux disease)     Heart attack 05/18/2022    Heart attack 05/23/2022    Hyperlipidemia     Hypertension     Stroke      Past Surgical History:   Procedure Laterality Date    ADENOIDECTOMY      ADRENAL GLAND SURGERY      APPENDECTOMY      BACK SURGERY      fusion l 4-5 s 1,2,3  fusion l 2-3    CORONARY ANGIOGRAPHY N/A 05/20/2022    Procedure: ANGIOGRAM, CORONARY ARTERY;  Surgeon: Domingo Martins MD;  Location: [facility] CATH LAB;  Service:  Cardiology;  Laterality: N/A;    CORONARY ANGIOGRAPHY N/A 05/24/2022    Procedure: ANGIOGRAM, CORONARY ARTERY;  Surgeon: Domingo Martins MD;  Location: [facility] CATH LAB;  Service: Cardiology;  Laterality: N/A;    EYE SURGERY      HEMORRHOID SURGERY      HERNIA REPAIR      HYSTERECTOMY      partial    indirect lumbar decompression      percutaneous placement of interspinous extension blocker    LEFT HEART CATHETERIZATION Left 05/20/2022    Procedure: CATHETERIZATION, HEART, LEFT;  Surgeon: Domingo Martins MD;  Location: [facility] CATH LAB;  Service: Cardiology;  Laterality: Left;    TONSILLECTOMY       Social History     Socioeconomic History    Marital status:    Tobacco Use    Smoking status: Never    Smokeless tobacco: Never   Substance and Sexual Activity    Alcohol use: No    Drug use: No    Sexual activity: Not Currently             Past/Current Medical/Surgical History, Past/Current Social History, Past/Current Family History and Past/Current Medications were reviewed in detail.        Objective:           VITAL SIGNS WERE REVIEWED      GENERAL APPEARANCE:     The patient looks comfortable.    Body habitus is normal 25.12 KG    No signs of respiratory distress.    Normal breathing pattern.    No dysmorphic features    Normal eye contact.     GENERAL MEDICAL EXAM:    HEENT:  Head is atraumatic normocephalic.     No tender temporal arteries. Fundoscopic (Ophthalmoscopic) exam showed no disc edema.      Neck and Axillae: No JVD. No visible lesions.    No carotid bruits. No thyromegaly. No lymphadenopathy.    Cardiopulmonary: No cyanosis. No tachypnea. Normal respiratory effort.    Clear breath sounds. S1, S2 with regular rhythm . No murmurs.     Gastrointestinal/Urogenital:  No jaundice. No stomas or lesions. No visible hernias. No catheters.     Abdomen is soft non-tender. No masses or organomegaly.    Skin, Hair and Nails: No pathognonomic skin rash. No neurofibromatosis. No visible lesions.No stigmata of  autoimmune disease. No clubbing.    Skin is warm and moist. No palpable masses.    Limbs: No varicose veins. No visible swelling.    No palpable edema. Pulses are symmetric. Pedal pulses are palpable.      Muskoskeletal: No   +OA visible deformities.No visible lesions. Poor posture    No spine tenderness. No signs of longstanding neuropathy. No dislocations or fractures.            Neurologic Exam     Mental Status   Oriented to person, place, and time.   Registration: recalls 3 of 3 objects. Recall at 5 minutes: recalls 3 of 3 objects. Follows 3 step commands.   Attention: normal. Concentration: normal.   Speech: speech is normal and not slurred   Level of consciousness: alert  Knowledge: good and consistent with education. Able to perform simple calculations.   Able to name object. Able to read. Able to repeat. Able to write. Normal comprehension.     Cranial Nerves     CN II   Visual fields full to confrontation.   Visual acuity: normal with correction  Right visual field deficit: none  Left visual field deficit: none     CN III, IV, VI   Pupils are equal, round, and reactive to light.  Extraocular motions are normal.   Right pupil: Size: 2 mm. Shape: regular. Reactivity: brisk. Consensual response: intact. Accommodation: intact.   Left pupil: Size: 2 mm. Shape: regular. Reactivity: brisk. Consensual response: intact. Accommodation: intact.   CN III: no CN III palsy  CN VI: no CN VI palsy  Nystagmus: none   Diplopia: none  Ophthalmoparesis: none  Upgaze: normal  Downgaze: normal  Conjugate gaze: present  Vestibulo-ocular reflex: present    CN V   Facial sensation intact.   Right facial sensation deficit: none  Left facial sensation deficit: none    CN VII   Right facial weakness: none  Left facial weakness: none    CN VIII   CN VIII normal.   Hearing: impaired    CN IX, X   CN IX normal.   CN X normal.   Palate: symmetric  Right gag reflex: normal  Left gag reflex: normal    CN XI   CN XI normal.   Right  sternocleidomastoid strength: normal  Left sternocleidomastoid strength: normal  Right trapezius strength: normal  Left trapezius strength: normal    CN XII   CN XII normal.   Tongue: not atrophic  Fasciculations: absent  Tongue deviation: none    Motor Exam   Muscle bulk: normal  Overall muscle tone: normal  Right arm tone: normal  Left arm tone: normal  Right arm pronator drift: absent  Left arm pronator drift: absent  Right leg tone: normal  Left leg tone: normal    Strength   Strength 5/5 throughout.   Right neck flexion: 5/5  Left neck flexion: 5/5  Right neck extension: 5/5  Left neck extension: 5/5  Right deltoid: 5/5  Left deltoid: 5/5  Right biceps: 5/5  Left biceps: 5/5  Right triceps: 5/5  Left triceps: 5/5  Right wrist flexion: 5/5  Left wrist flexion: 5/5  Right wrist extension: 5/5  Left wrist extension: 5/5  Right interossei: 5/5  Left interossei: 5/5  Right iliopsoas: 5/5  Left iliopsoas: 5/5  Right quadriceps: 5/5  Left quadriceps: 5/5  Right hamstrin/5  Left hamstrin/5  Right glutei: 5/5  Left glutei: 5/5  Right anterior tibial: 5/5  Left anterior tibial: 5/5  Right posterior tibial: 5/5  Left posterior tibial: 5/5  Right peroneal: 5/5  Left peroneal: 5/5  Right gastroc: 5/5  Left gastroc: 5/5    Sensory Exam   Light touch normal.   Right arm light touch: normal  Left arm light touch: normal  Right leg light touch: normal  Left leg light touch: normal  Vibration normal.   Right arm vibration: normal  Left arm vibration: normal  Right leg vibration: normal  Left leg vibration: normal  Proprioception normal.   Right arm proprioception: normal  Left arm proprioception: normal  Right leg proprioception: normal  Left leg proprioception: normal  Pinprick normal.   Right arm pinprick: normal  Left arm pinprick: normal  Right leg pinprick: normal  Left leg pinprick: normal  Sensory deficit distribution on left: lateral cutaneous thigh  Graphesthesia: normal  Stereognosis: normal    Gait,  Coordination, and Reflexes     Gait  Gait: wide-based    Coordination   Romberg: negative  Finger to nose coordination: normal  Heel to shin coordination: normal  Tandem walking coordination: normal    Tremor   Resting tremor: absent  Intention tremor: absent  Action tremor: left arm and right arm    Reflexes   Right brachioradialis: 2+  Left brachioradialis: 2+  Right biceps: 2+  Left biceps: 2+  Right triceps: 2+  Left triceps: 2+  Right patellar: 2+  Left patellar: 2+  Right achilles: 2+  Left achilles: 2+  Right : 1+  Left : 1+  Right Lugo: absent  Left Lugo: absent  Right ankle clonus: absent  Left ankle clonus: absent  Right pendular knee jerk: absent  Left pendular knee jerk: absentMild ET     Lab Results   Component Value Date    WBC 10.48 03/08/2023    HGB 14.4 03/08/2023    HCT 45.3 03/08/2023    MCV 90 03/08/2023     03/08/2023     Sodium   Date Value Ref Range Status   03/08/2023 143 136 - 145 mmol/L Final     Potassium   Date Value Ref Range Status   03/08/2023 4.0 3.5 - 5.1 mmol/L Final     Chloride   Date Value Ref Range Status   03/08/2023 104 95 - 110 mmol/L Final     CO2   Date Value Ref Range Status   03/08/2023 24 23 - 29 mmol/L Final     Glucose   Date Value Ref Range Status   03/08/2023 100 70 - 110 mg/dL Final     BUN   Date Value Ref Range Status   03/08/2023 28 (H) 8 - 23 mg/dL Final     Creatinine   Date Value Ref Range Status   03/08/2023 1.1 0.5 - 1.4 mg/dL Final     Calcium   Date Value Ref Range Status   03/08/2023 9.9 8.7 - 10.5 mg/dL Final     Total Protein   Date Value Ref Range Status   03/08/2023 7.7 6.0 - 8.4 g/dL Final     Albumin   Date Value Ref Range Status   03/08/2023 4.0 3.5 - 5.2 g/dL Final     Total Bilirubin   Date Value Ref Range Status   03/08/2023 1.0 0.1 - 1.0 mg/dL Final     Comment:     For infants and newborns, interpretation of results should be based  on gestational age, weight and in agreement with clinical  observations.    Premature  Infant recommended reference ranges:  Up to 24 hours.............<8.0 mg/dL  Up to 48 hours............<12.0 mg/dL  3-5 days..................<15.0 mg/dL  6-29 days.................<15.0 mg/dL       Alkaline Phosphatase   Date Value Ref Range Status   03/08/2023 93 55 - 135 U/L Final     AST   Date Value Ref Range Status   03/08/2023 30 10 - 40 U/L Final     ALT   Date Value Ref Range Status   03/08/2023 32 10 - 44 U/L Final     Anion Gap   Date Value Ref Range Status   03/08/2023 15 8 - 16 mmol/L Final     eGFR if    Date Value Ref Range Status   07/23/2022 >60 >60 mL/min/1.73 m^2 Final     eGFR if non    Date Value Ref Range Status   07/23/2022 >60 >60 mL/min/1.73 m^2 Final     Comment:     Calculation used to obtain the estimated glomerular filtration  rate (eGFR) is the CKD-EPI equation.        Lab Results   Component Value Date    HOVMPGSA03 408 12/02/2019     Lab Results   Component Value Date    TSH 1.171 02/01/2023 02-    CTA Head Neck       Chronic right posterior cerebral artery occlusion with right posterior cerebral artery territory infarct     Dense atherosclerotic plaque of the right and of the left cavernous ICA segments.     Bilateral common carotid artery bifurcation calcified plaque with low-grade 20% or less right internal carotid artery origin stenosis.     CT Head.   Chronic microvascular ischemic changes.       Reviewed the neuroimaging independently       Assessment:       1. Tremors of nervous system    2. Late effect of cerebrovascular accident (CVA)    3. Hemianopia of left eye    4. Family history of Parkinson disease    5. Hemiplegia and hemiparesis following cerebral infarction affecting left non-dominant side    6. Osteopenia of multiple sites        Plan:     ET MILD FAMILY HISTORY OF PARKINSON DISEASE (BROTHER)        Unable to obtain  Brain MRI WO unable due to pain stimulator     Amisha scan R/o PD     Avoid stimulants like caffeine,  nicotineetc.    Cut down steroids if possible    Avoid hot drinks, drink from half empty glasses and wear heavy bracelet/watch.     Will clinically monitor tremors are not bothersome and very mild, no pharmacological options need at this time      Other pharmacological options include: Primidone Inderal and TPM    Non-pharmacological options include: Neuravive, Gamma Knife and DBS.     I also counseled the patient about the fact the medication decreases the amplitude and not the rate of the tremor and less effective for titubition.  I also counseled the patient that the disease is slowly progressive.       MEDICAL/SURGICAL COMORBIDITIES     All relevant medical comorbidities noted and managed by primary care physician and medical care team.          MISCELLANEOUS MEDICAL PROBLEMS       HEALTHY LIFESTYLE AND PREVENTATIVE CARE    Encouraged the patient to adhere to the age-appropriate health maintenance guidelines including screening tests and vaccinations.     Discussed the overall importance of healthy lifestyle, optimal weight, exercise, healthy diet, good sleep hygiene and avoiding drugs including smoking, alcohol and recreational drugs. The patient verbalized full understanding.       Advised the patient to follow COVID-19 prevention measures.       I spent 60 minutes, more than 50 % spent face to face with the patient    Time spent in counseling and coordination of care including discussions etiology of diagnosis, pathogenesis of diagnosis, prognosis of diagnosis,, diagnostic results, impression and recommendations, diagnostic studies, management, risks and benefits of treatment, instructions of disease self-management, treatment instructions, follow up requirements, patient and family counseling/involvement in care compliance with treatment regimen. All of the patient's questions were answered during this discussion.     Jennifer Millan, MSN NP      Collaborating Provider: Gunnar Grover MD, FAAN  Neurologist/Epileptologist

## 2023-07-12 NOTE — PATIENT INSTRUCTIONS
HERE ARE 10 WAYS TO REDUCE RISK OF DEMENTIA AND SLOW DOWN THE PROGRESSION OF DEMENTIA    1.Be physically active.    2. Avoid smoking and alcohol consumption.    3. Track your numbers. Keep your blood pressure, cholesterol, blood sugar and weight within recommended ranges.    4. Stay socially connected.    5. Make healthy food choices. Eat a well-balance and healthy diet that rich in cereals, fish, legumes, and vegetables.    6. Reduce stress.     7. Challenge your brain by trying something new, playing games, or learning a new language.    8. Take care of your hearing. Avoid being continuously exposed to loud sounds and wear a hearing aid if hearing does become a problem.    9. Lower risk of falls. Consider installing handrails on all stairs and grab bars in bathrooms.    10. Reduce your exposure to air pollution, such as exposure to exhaust in heavy traffic.         PREVENTION OF DELIRIUM       1. Good hydration and avoid electrolyte imbalance  2. Recognize and treat infections immediately especially UTI.  3. Bladder emptying and prevent constipation.   4. Provide stimulating activities and familiar objects  5. Use eyeglasses and hearing aids if needed.   6. Use simple and regular communication about people, current place, and time  7. Mobility and range-of-motion exercises  8. Reduce noise, lighting and avoid sleep interruptions  9. Non-narcotic pain management.  10.Nondrug treatment for sleep problems or anxiety  11. Avoid antihistamines.  12. Avoid narcotics.  13. Avoid benzodiazepines.            HELPFUL STRATEGIES FOR THE FAMILY AND CAREGIVERS        BEHAVIORAL MANAGEMENT STRATEGIES     Remember that you cannot reason with acute psychosis or confusion. Unless there is an immediate safety issue, there is no need to challenge or correct the person.  For example, if a pt is hallucinating, do not argue with the person that what they are seeing is not real. If they are expressing paranoia, empathize gently with  "their fear, and attempt to redirect them to a different activity.   If the person is particularly stuck on a topic or activity, as long as the activity is safe and is not worsening their agitation, you don't need to intervene.     Communicate calmly, simply, and concisely    Reassure the person that they are safe and you are here to help  Try not to express irritation or anger  Speak quietly and calmly, do not shout or threaten the person. Avoid provocation.   Establish verbal contact and provide orientation and reassurance.  Attempt to identify the patient's wants and feelings, listen to what they are saying, and reflect those wants and feelings back to the patient.  Dont use sarcasm as a weapon    Set clear limits on behavior in a calm voice ("You cannot hit the nurse.")  Offer choices and optimism ("Which toothpaste would you like to use today?")    Redirect the person to an alternative behavior ("Can you come help me with this?)    Avoid saying things like, "We already went over this!" "You can't keep doing this." "I already explained this to you"  Instead, simply nod calmly and acknowledge what the person is saying ("Yes, that makes sense."), and then request their help or company in a different behavior.   Having a mental list of activities the patient enjoys can be helpful with redirection. For example, do they enjoy going for walks? Eating a piece of candy?   If redirection becomes challenging, you can place objects that your family member enjoys strategically in the home in order to use for distraction. This is particularly useful if there are items that they often talk about or frequently ask you questions about; or if they are visually striking. Once you focus the person on this object, talk about it briefly until they are calm and then attempt to redirect to a new behavior.   Sometimes activities which are repetitive can be calming, particularly if there is a comforting sensory element. For example, pt " "may enjoy folding soft towels or laundry.    Limit overstimulation    Decrease distractions by turning off the TV, computer, any fluorescent lights that hum, etc.  Ask any casual visitors to leave--the fewer people the better  If the person is very agitated, avoid touching them, and respect their personal space  Sit down and ask the person to sit down also     PREVENTATIVE STRATEGIES     Try to help the patient develop a routine and stick to it  Address all immediate safety issues  Does the person still have access to the car and keys? Take the keys away, or disconnect the car battery.  Are they wandering at night? Install locks on the outside doors. A keypad lock works well. Alarms on bedroom doors can also be helpful.   Are they turning on the stove and risking a fire? If you cannot limit their access to the kitchen, you may need to unplug dangerous devices any time you are not in the room.   If the person is exhibiting poor judgment throughout the day, they likely need someone supervising them at all times.   Do they still have access to significant financial assets? Limit their access to accounts, and consult with a  if necessary.   Identify situations that seem to trigger agitation or a problematic behavior, and modify the person's environment to minimize or eliminate that trigger.   For example, is their behavior worse if they don't get a good night's sleep? Or if they don't eat or drink enough? If so, prioritize those activities and build behavior plans to support them.  Do they get upset about not being allowed to answer the phone when it rings? Put all of the phones in the house on "silent."   Do they become paranoid that certain family members are trying to take advantage of them? Limit contact with the targeted family member as much as possible, and re-introduce them slowly after some time has passed.   Encourage independence in daily living whenever safely possible  Encourage collaborative " decision-making regarding his care as well as daily activities  Encourage regular physical exercise  Address issues that may be impacting sleep   Ex: Urology consult for frequent nighttime urination, address chronic pain that may be interfering with sleep, moving to a different room if his roommate snores loudly, make sure the room is a comfortable temperature and he has adequate pillows and blankets  Ensure he gets out into the sunlight at least once per day to encourage regular circadian cycle  No caffeine, sugar, or large meals within two hours of bedtime   Make sure room at night is dark and quiet and minimize nighttime interruptions from staff unless medically necessary   Try to help pt go to sleep and wake up at the same time every day  Monitor closely for worsening psychiatric symptoms (insomnia, social withdrawal, deterioration of personal hygiene, hostility, confusing or nonsensical speech, paranoia, hallucinations) and intervene promptly. Assess for possible antecedents, modify environment appropriately.            SLEEP HYGIENE     Poor sleep has a negative effect on cognition. Several strategies have been shown to improve sleep:     Caffeine intake in the afternoon and evening, as well as stuffing oneself at supper, can decrease the quality of restful sleep throughout the night.   Bedtime and wake-up times should be consistent every night and morning so the body becomes used to a single routine, even on the weekends.  Engage in daily physical activity, but not 2-3 hours before bedtime.   No technology use (television, computer, iPad) 1-2 hours before bed.   Have a wind down routine (e.g., soft lights in the house, bath before bed, reduced fluid intake, songs, reading, less noise) to promote sleep readiness.   Visit the www.sleepfoundation.org for more strategies.      COGNITIVE HYGIENE     Engage in regular exercise, which increases alertness and arousal and can improve attention and focus.  Consider  lower impact exercises, such as yoga or light walking.  Get a good nights sleep, as this can enhance alertness and cognition.  Eat healthy foods and balanced meals. It is notable that research indicates certain nutrients may aid in brain function, such as B vitamins (especially B6, B12, and folic acid), antioxidants (such as vitamins C and E, and beta carotene), and Omega-3 fatty acids. Talk with your physician or nutritionist about whats right for you.   Keep your brain active. Find activities to stay mentally active, such as reading, games (cards, checkers), puzzles (crosswords, Sudoku, jig saw), crafts (models, woodworking), gardening, or participating in activities in the community.  Stay socially engaged. Continue staying active with your family and friends.             Tremors Medication:       I also counseled the patient about the fact the medication decreases the amplitude and not the rate of the tremor and less effective for titubition.  I also counseled the patient that the disease is slowly progressive.     I informed patient that the daily allowance for caffeine for a normal adult with out tremors is up to 400 milligrams (mg) of caffeine a day appears to be safe for most healthy adults. That's roughly the amount of caffeine in four cups of brewed coffee. Studies show that 100 to 200 mg of caffeine (about 1 to 2 cups of regular coffee) are enough to achieve these results. When caffeine consumption climbs to 250 to 700 mg per day, people may experience nausea, headaches, sleep difficulties, tremors, or increased anxiety.       It is well recognized that caffeine and beta-adrenergic drugs are capable of both causing tremors and worsening existing tremors. The severity of the effect may vary according to the amount of caffeine consumed.

## 2023-07-21 ENCOUNTER — CLINICAL SUPPORT (OUTPATIENT)
Dept: REHABILITATION | Facility: HOSPITAL | Age: 83
End: 2023-07-21
Payer: MEDICARE

## 2023-07-21 DIAGNOSIS — R29.898 FINE MOTOR IMPAIRMENT: ICD-10-CM

## 2023-07-21 DIAGNOSIS — Z78.9 DECREASED ACTIVITIES OF DAILY LIVING (ADL): ICD-10-CM

## 2023-07-21 DIAGNOSIS — M62.81 DECREASED MUSCLE STRENGTH: ICD-10-CM

## 2023-07-21 DIAGNOSIS — R29.898 DECREASED GRIP STRENGTH OF LEFT HAND: ICD-10-CM

## 2023-07-21 DIAGNOSIS — Z74.09 IMPAIRED FUNCTIONAL MOBILITY, BALANCE, GAIT, AND ENDURANCE: Primary | ICD-10-CM

## 2023-07-21 DIAGNOSIS — R29.818 FINE MOTOR IMPAIRMENT: ICD-10-CM

## 2023-07-21 DIAGNOSIS — M25.60 RANGE OF MOTION DEFICIT: Primary | ICD-10-CM

## 2023-07-21 PROCEDURE — 97140 MANUAL THERAPY 1/> REGIONS: CPT

## 2023-07-21 PROCEDURE — 97530 THERAPEUTIC ACTIVITIES: CPT

## 2023-07-21 PROCEDURE — 97112 NEUROMUSCULAR REEDUCATION: CPT

## 2023-07-21 PROCEDURE — 97110 THERAPEUTIC EXERCISES: CPT

## 2023-07-21 NOTE — PROGRESS NOTES
[facility]  Occupational Therapy Treatment Note     Date: 7/21/2023  Name: Leslie CASTELLON [facility] Number: [id number]    Therapy Diagnosis:   Encounter Diagnoses   Name Primary?    Range of motion deficit Yes    Decreased  strength of left hand     Decreased muscle strength     Decreased activities of daily living (ADL)     Fine motor impairment        Physician: Angeles Carson MD    Physician Orders: Occupational Therapy to Evaluate and Treat   Medical Diagnosis: R29.898 (ICD-10-CM) - Poor fine motor skills  Surgical Procedure and Date: N/A     Evaluation Date: 5/17/2023  Insurance Authorization Period Expiration: 6/5/2023 - 12/31/2023  Plan of Care Certification Period: 5/17/2023 - 10/17/2023  Progress Note Due: 8/10/2023      Date of Return to MD: N/A     Visit # / Visits authorized: 5/25 (6 Episode Visits)   FOTO: 1/3     Precautions:  Standard    Time In: 11:30  Time Out: 12:15  Total Treatment Time: 45 minutes  Total Billable Time: 45 minutes    Subjective     Patient reports: She is having less difficulty with ADL's    she was compliant with home exercise program given last session.   Response to previous treatment: Tolerated well  Functional change: Improved knowledge of HEP    Pain: 0/10  Location: bilateral hands     Objective      Please see progress not dated 7/10/2023 for updated objective measures.    Treatment     Supervised Modalities: Modalities such as hot/cold packs, traction, unattended electrical stimulation, paraffin bath, fluidotherapy to reduce pain and increase soft tissue extensibility. Leslie received (0) minutes of modalities listed below after being cleared for contraindications.  x = modalities performed     Supervised Modalities 7/21/2023                            Manual Therapy Techniques: Joint mobilizations, Soft tissue Mobilization, and mobilization with movement applied to the area listed below. Leslie received (0) minutes of interventions. x = intervention  performed today    Manual Intervention 7/21/2023    Soft Tissue Mobilization     Joint Mobilizations     Mobilization with movement              Therapeutic Exercises: to develop strength, endurance, ROM, flexibility, posture and core stabilization. Leslie received (25) minutes of exercises listed below. x = performed today * = level of progression of activity     Therapeutic Exercise 7/21/2023    Interossei  -green foam  -rubber bands x 20x bilateral    Digi flexion - red x 20x bilateral    Resistive pinch - red  -2pt  -3pt  -lateral x 20x bilateral    Thumb palmar   -adduction  -abduction x 20x bilateral    Thumb radial  -adduction  -abduction x 20x bilateral    Forearm exercise  - wrist flexion  -wrist extension  - supination  -pronation x 2lb        Therapeutic Activities: Everyday tasks to address the combined need of improved strength, range of motion, and endurance to achieve increased independence. While simulating these activities, the person is applying the gained skills of strength and improved range of motion in a practical way.  Leslie received (15) minutes of activities listed below. x = activities performed today * = level of progression of activity     Therapeutic Activities 7/21/2023    Velcro board  -lateral pinch  - dowel  x 20x bilateral    Opening and closing containers x 5 minutes, ergonomics               Neuromuscular Re-education: the use of functional strengthening, stretching, balancing and coordination activities that train the nerves and muscles to react and communicate to restore normal body movement patterns to increase self care independence. Leslie received (15) minutes of interventions listed below.  x = interventions performed today * = level of progression of activity     Neuromuscular Re-education 7/21/2023    Finger opposition  x 20x   Finger taps (extension) x 20x   Finger opposition with slides x    Table Top active assist range of motion  - shoulder flexion  - shoulder  abduction  x 5x - 15 second holds     Self Care: Fundamental skills required to independently care for oneself. Activities of daily living such as eating, bathing, dressing, toileting, grooming, sleeping, transfers and mobility. Instrumental activities of daily living such as driving, medication management, pet care, home management/cleaning, financial management, using the phone, meal preparation and shopping. Leslie received (0) minutes of interventions listed below.  x = interventions performed * = level of progression of activity     Self Care 7/21/2023                            Home Exercises and Education Provided     Education provided:   - home exercise program education provided  - progress towards goals     Written Home Exercises Provided: Patient instructed to cont prior HEP.  Exercises were reviewed and Leslie was able to demonstrate them prior to the end of the session. Leslie demonstrated good understanding of the home exercise program provided.     See EMR under Patient Instructions for exercises provided  5/17/2023 .        Assessment     Patient tolerated exercises well. Needs verbal cues for hand placement.     Leslie is progressing towards her goals and there are no updates to goals at this time. Patient prognosis is Good.     Patient will continue to benefit from skilled outpatient occupational therapy to address the deficits listed in the problem list on initial evaluation provide patient/family education and to maximize patient's level of independence in the home and community environment.     Patient's spiritual, cultural and educational needs considered and patient agreeable to plan of care and goals.    Anticipated barriers to occupational therapy: Cognition, home exercise program compliance    Goals:     Short Term Goals:  6 weeks  Progress    Pain: Patient will demonstrate improved pain by reports of less than or equal to 7/10 worst pain on the verbal rating scale in order to progress toward  maximal functional ability and improve quality of life. PC   Function: Patient will demonstrate improved function as indicated by a functional limitation score of less than or equal to 36 out of 100 on FOTO. PC   Mobility: Patient will improve active range of motion to 50% of stated goals, listed in objective measures above, in order to progress towards independence with functional activities.  PC   Strength: Patient will improve strength to 50% of stated goals, listed in objective measures above, in order to progress towards independence with functional activities.  PC   HEP: Patient will demonstrate independence with home exercise program in order to progress toward functional independence. PC      Long Term Goals:  12 weeks  Progress   Pain: Patient will demonstrate improved pain by reports of less than or equal to 6/10 worst pain on the verbal rating scale in order to progress toward maximal functional ability and improve quality of life.   PC   Function: Patient will demonstrate improved function as indicated by a functional limitation score of less than or equal to 36 out of 100 on FOTO. PC   Mobility: Patient will improve active range of motion to stated goals, listed in objective measures above, in order to return to maximal functional potential and improve quality of life. PC   Strength: Patient will improve strength to stated goals, listed in objective measures above, in order to improve functional independence and quality of life. PC   Patient will return to normal ADL's, IADL's, community involvement, recreational activities, and work-related activities with less than or equal to 5/10 pain and maximal function.  PC      Goals Key:  PC= Progressing/Continue;       PM= Partially Met;       GM= Goal Met,      DC= Discontinue    Plan     Continue Plan of Care (POC) and progress per patient tolerance.      Ivana Gallo, OT  ,OTD, OTR/L

## 2023-07-22 NOTE — PROGRESS NOTES
[facility]   Physical Therapy Treatment Note + UPOC    Name: Leslie CASTELLON [facility] Number: [id number]    Therapy Diagnosis:   Encounter Diagnosis   Name Primary?    Impaired functional mobility, balance, gait, and endurance Yes     Physician: Sepideh Peters,*    Visit Date: 7/21/2023    Physician Orders: PT Eval and Treat   Medical Diagnosis from Referral: R29.6 (ICD-10-CM) - Falls frequently  Evaluation Date: 2/27/2023  Authorization Period Expiration: 2/17/24  Plan of Care Expiration: 4/28/23  Progress Note Due: 5/6/23  Visit # / Visits authorized: 20/20 (+eval)  FOTO: 0/3 not appropriate due to poor insight and cognitive deficits     Precautions: Standard and Fall     PTA Visit #: 0/5     Time In: 1035  Time Out: 1135  Total Billable Time: 60 minutes    SUBJECTIVE     Pt reports: Patient reports that she has recently received lumbar injection.  She reports of pain relief at both the hip and knee.  Response to previous treatment: She responded well to last session.   Functional change: She reports no functional change since last session.     Pain: 3/10  Location: right knee and lower back    OBJECTIVE     Objective Measures updated at progress report unless specified.     Update:   Functional Assessments:  Test Eval Score 3/30/23 4/27/23 7/10   TUG 15.35 seconds 13.98 seconds NT due to patient with wedged heel shoes today 22.57 w/o walker  15.26 w/ walker   30 s sit to stand    7 reps   5 x Sit to Stand 23.55 seconds 14.14 seconds 12.45 seconds 25s   4 Square Step Test 26 seconds NT NT due to patient with wedged heel shoes today            Patient reports a score of 46/56 on the Encarnacion Balance Assessment on 4/27/23.              Treatment     Leslie received the treatments listed below:      THERAPEUTIC EXERCISES to develop strength, endurance, ROM, flexibility, posture, and core stabilization for (45) minutes including:    Intervention Performed Today    Supine Stretches       x -  3x30 seconds bilateral for each:  - lower trunk rotation   - single knee to chest  - hamstring/ heelcord   Seated Stretches  - 3x30 seconds bilateral for each:  - hamstring/ heelcord  - hip adductor   Standing Stretches  - 3x15 seconds bilateral for each:  - IT band   - heelcord    Seated lower extremity exercises                 x - hip flexion 2x10 bilateral   - hamstring curl 2x10 bilateral   - dorsiflexion/ plantarflexion 2x10 bilateral with green band  - eversion 1x10 bilateral   - toe curls and flares 1x20 bilateral   - hip adduction against ball 1x10 with 3 second hold  - hip abduction against belt 1x10 with 3 second hold  Clam shells red theraband sitting - 2x10  Long arc quads - 5# - 10 reps switching x 3 minutes   Standing lower extremity exercises  X  X  X  x - hip flexion 2x10 bilateral   - hip abduction 2x10 bilateral   - hip extension 2x10 bilateral   - heel raise 2x10    NuStep for endurance  - Level 2, 10 minutes   Bicycle for endurance and ROM x - Level 1 x 6 minutes   Supine x - Quad set with short arc quad 2x10 with 3 second hold   - dorsiflexion against green band 2x10 bilateral    Lower Trunk Rotations x 10 second holds x 10   Pelvic tilts x 10 second holds x 10   Bridges  x 2x10   Abdominal Bracing x Knee to chest - 1x10 bilateral    Sidelying hip abduction x 2x10   Straight leg raise   - 1x10 bilateral    Sitting      Tandem stance x 2x10 with hand touches   Neoprene wrap to Right knee for all exercises    neuromuscular re-education activities to improve: Balance, Coordination, Kinesthetic, Sense, Proprioception, and Posture for (0) minutes. The following activities were included:    Intervention Performed Today    Air Ex Beam        - Tandem holds 5 seconds, 3x's   - Tandem walking forward/backward 3x's each  - Side stepping 3x's each direction  - tandem walking over hurdles forward and side stepping 2x's each  - heel lifts and toe lifts off of beam with 2-0 hand supports   - single leg  standing while tapping opposite foot forward and back on beam   Orange Rubber Beam    - Tandem holds 3 seconds, 3x's bilateral   - Tandem walking forward 4x's   - Side stepping 2x's each direction  - single leg standing while tapping opposite foot forward and back on beam   Air Ex Pad              - Weight shifts Left to Right 2x10  - Weight shifts forward/back 2x10  - single leg standing holds   - Eyes closed 10 seconds  - backward pivot steps with 1 hand support 1x8 bilateral   - standing on pad while toss/catching ball to wall 1x8  - One lower extremity on pad and opposite lower extremity on step tossing ball to wall    Hand paddled per Neuro LEONIE technique and for entirety of session     Core Exercises in supine  Bilateral lower extremities over Tball for each:   - lower trunk rotation 2x10  - bilateral knees to chest 2x10  - posterior pelvic tilt to bridge 2x10   Parallel bars      - walking backward 4x8 ft with 2-1 hand support  - braided walking 4x's length of bars  - narrow base standing without hand support performing head turns/nods 1x5 each, arm reach/lifts 1x5 each   Trunk extension for posture  - 2x10 over pillow and ball at low back    Sit to squat without hand support  2x10 from progressively lowered mat   Step ups  6 inch step  - 2 sets of 1 minute on each leg   Functional Re-Tests  - see above   Encarnacion Balance Scale  - see above   Agility Drills on Turf  - side stepping 2x20 ft  - backward walking 2x20 ft - without shoes  - tandem walking 2x15 ft - without shoes       THERAPEUTIC ACTIVITIES to improve dynamic and functional performance for () minutes including:    Intervention Performed Today    Patient and her granddaughter educated on Home Exercise Program for balance.  Also educated on wearing tennis shoes to future sessions.  Discussed the process of changing schedule to get her seen by an ortho PT 1 day/week then by me 1day/week  - Patient and granddaughter verbalized good understanding of  education provided.                                              MANUAL THERAPY TECHNIQUES were applied for (15) minutes, including:    Manual Intervention Performed Today    Soft Tissue Mobilization  Bilateral thoracolumbar spine   Joint Mobilizations [x] Patellar mobs, tibial ER/IR with knee extension/flexion    []     []    Functional Dry Needling  [] Bilateral thoracolumbar spine     Plan for Next Visit: Continue as needed         Gait Training: to improve functional mobility and safety for  () minutes, including:  - working on increasing step length and upright posture with gait sequence, ambulating 200ft and 100ft with contact guard assist for safety awareness.     Patient Education and Home Exercises     Home Exercises Provided and Patient Education Provided     Education provided:   - 3/14/23 Patient educated on Home Exercise Program for balance   - 3/16/23 Patient encouraged to stay compliant with Home Exercise Program   - 4/27/23 discussed continuing with James PT for dry needling 1 day/week for the next 4 weeks.   Written Home Exercises Provided: yes. Exercises were reviewed and Leslie was able to demonstrate them prior to the end of the session.  Leslie verbalized good  understanding of the education provided. See EMR under Patient Instructions for exercises provided during therapy sessions    ASSESSMENT     Patient with significant pain relief.  Nearing completion of PT services.  Able to perform standing exercises with pain relief.  However, there was still some balance deficits.  Did work on tandem stance.  Would do well with group exercise program.    Leslie Is progressing well towards her goals.   Pt prognosis is Good.     Pt will continue to benefit from skilled outpatient physical therapy to address the deficits listed in the problem list box on initial evaluation, provide pt/family education and to maximize pt's level of independence in the home and community environment.     Pt's spiritual, cultural  and educational needs considered and pt agreeable to plan of care and goals.     Anticipated barriers to physical therapy: co-morbidities, sedentary lifestyle, chronicity of condition, and lack of understanding of condition     GOALS:     Short Term Goals:  4 weeks Progress    Function: Patient will demonstrate improved function as indicated by a functional limitation score improved by 3 points from initial measure.  PC   Strength: Patient will demonstrate improved strength by performing 5x sit to  20 seconds in order to progress towards independence with functional activities.  Met   Balance: Complete Encarnacion Balance Scale and set goals accordingly.  Met   Coordination: Patient will demonstrate improved time of 13 sec on TUG to demonstrate improved coordination and safety with mobility. PC   HEP: Patient will demonstrate independence with HEP in order to progress toward functional independence.  Met   Determine the need of Custom Wheelchair Eval to improve patient's positioning and independence with pressure reliefs and independence with home and community mobility to decrease burden of care. D/C not needed at this time.              Long Term Goals:  8 weeks Progress   Function: Patient will demonstrate improved function as indicated by a functional limitation score improved 5 points from initial measure.  PC   Strength: Patient will demonstrate improved strength by performing 5x sit to  17 seconds in order to progress towards independence with functional activities.  Met   Patient will return to ADL's, IADL's, community involvement, recreational activities, and work-related activities with less than or equal to 5/10 pain and maximal function.  PC   Balance: Improve Encarnacion Balance score to 50/56 to demonstrate improved balance and decreased risk of falls.  PC   Coordination: Patient will demonstrate improved time of 11 sec on TUG to demonstrate improved coordination and safety with mobility. PC   HEP:  Patient educated on HEP in preparation for d/c verbalizing and demonstrating good understanding of topics discussed.   PC            Goals Key:  PC= progressing/continue;        PM= partially met;             DC= discontinue    PLAN     Continue Plan of Care (POC) and progress per patient tolerance. See Treatment section for exercise progression.    Leoncio Lujan, PT

## 2023-07-24 ENCOUNTER — CLINICAL SUPPORT (OUTPATIENT)
Dept: REHABILITATION | Facility: HOSPITAL | Age: 83
End: 2023-07-24
Payer: MEDICARE

## 2023-07-24 DIAGNOSIS — R29.818 FINE MOTOR IMPAIRMENT: ICD-10-CM

## 2023-07-24 DIAGNOSIS — R29.898 DECREASED GRIP STRENGTH OF LEFT HAND: ICD-10-CM

## 2023-07-24 DIAGNOSIS — R29.898 FINE MOTOR IMPAIRMENT: ICD-10-CM

## 2023-07-24 DIAGNOSIS — M25.60 RANGE OF MOTION DEFICIT: Primary | ICD-10-CM

## 2023-07-24 DIAGNOSIS — Z78.9 DECREASED ACTIVITIES OF DAILY LIVING (ADL): ICD-10-CM

## 2023-07-24 DIAGNOSIS — M62.81 DECREASED MUSCLE STRENGTH: ICD-10-CM

## 2023-07-24 DIAGNOSIS — Z74.09 IMPAIRED FUNCTIONAL MOBILITY, BALANCE, GAIT, AND ENDURANCE: Primary | ICD-10-CM

## 2023-07-24 PROCEDURE — 97110 THERAPEUTIC EXERCISES: CPT

## 2023-07-24 PROCEDURE — 97112 NEUROMUSCULAR REEDUCATION: CPT

## 2023-07-24 PROCEDURE — 97530 THERAPEUTIC ACTIVITIES: CPT

## 2023-07-24 NOTE — PROGRESS NOTES
[facility]  Occupational Therapy Treatment Note     Date: 7/24/2023  Name: Leslie CASTELLON [facility] Number: [id number]    Therapy Diagnosis:   Encounter Diagnoses   Name Primary?    Range of motion deficit Yes    Decreased  strength of left hand     Decreased muscle strength     Decreased activities of daily living (ADL)     Fine motor impairment        Physician: Angeles Carson MD    Physician Orders: Occupational Therapy to Evaluate and Treat   Medical Diagnosis: R29.898 (ICD-10-CM) - Poor fine motor skills  Surgical Procedure and Date: N/A     Evaluation Date: 5/17/2023  Insurance Authorization Period Expiration: 6/5/2023 - 12/31/2023  Plan of Care Certification Period: 5/17/2023 - 10/17/2023  Progress Note Due: 8/10/2023      Date of Return to MD: N/A     Visit # / Visits authorized: 6/25 (7 Episode Visits)   FOTO: 1/3     Precautions:  Standard    Time In: 9:00  Time Out: 9:45  Total Treatment Time: 45 minutes  Total Billable Time: 45 minutes    Subjective     Patient reports: She is having less difficulty with ADL's.    she was compliant with home exercise program given last session.   Response to previous treatment: Tolerated well  Functional change: Improved knowledge of HEP    Pain: 0/10  Location: bilateral hands     Objective      Please see progress not dated 7/10/2023 for updated objective measures.    Treatment     Supervised Modalities: Modalities such as hot/cold packs, traction, unattended electrical stimulation, paraffin bath, fluidotherapy to reduce pain and increase soft tissue extensibility. Leslie received (0) minutes of modalities listed below after being cleared for contraindications.  x = modalities performed     Supervised Modalities 7/24/2023                            Manual Therapy Techniques: Joint mobilizations, Soft tissue Mobilization, and mobilization with movement applied to the area listed below. Leslie received (0) minutes of interventions. x = intervention  performed today    Manual Intervention 7/24/2023    Soft Tissue Mobilization     Joint Mobilizations     Mobilization with movement              Therapeutic Exercises: to develop strength, endurance, ROM, flexibility, posture and core stabilization. Leslie received (15) minutes of exercises listed below. x = performed today * = level of progression of activity     Therapeutic Exercise 7/24/2023    Interossei  -green foam  -rubber bands x 20x bilateral    Digi flexion - red x 20x bilateral    Resistive pinch - red  -2pt  -3pt  -lateral x 20x bilateral    Thumb palmar   -adduction  -abduction x 20x bilateral    Thumb radial  -adduction  -abduction x 20x bilateral    Forearm exercise  - wrist flexion  -wrist extension  - supination  -pronation x 2lb        Therapeutic Activities: Everyday tasks to address the combined need of improved strength, range of motion, and endurance to achieve increased independence. While simulating these activities, the person is applying the gained skills of strength and improved range of motion in a practical way.  Leslie received (15) minutes of activities listed below. x = activities performed today * = level of progression of activity     Therapeutic Activities 7/24/2023    Velcro board  -lateral pinch  - dowel  x 20x bilateral    Opening and closing containers x 5 minutes, ergonomics               Neuromuscular Re-education: the use of functional strengthening, stretching, balancing and coordination activities that train the nerves and muscles to react and communicate to restore normal body movement patterns to increase self care independence. Leslie received (15) minutes of interventions listed below.  x = interventions performed today * = level of progression of activity     Neuromuscular Re-education 7/24/2023    Finger opposition  x 20x   Finger taps (extension) x 20x   Finger opposition with slides x    Table Top active assist range of motion  - shoulder flexion  - shoulder  abduction  x 5x - 15 second holds     Self Care: Fundamental skills required to independently care for oneself. Activities of daily living such as eating, bathing, dressing, toileting, grooming, sleeping, transfers and mobility. Instrumental activities of daily living such as driving, medication management, pet care, home management/cleaning, financial management, using the phone, meal preparation and shopping. Leslie received (0) minutes of interventions listed below.  x = interventions performed * = level of progression of activity     Self Care 7/24/2023                            Home Exercises and Education Provided     Education provided:   - home exercise program education provided  - progress towards goals     Written Home Exercises Provided: Patient instructed to cont prior HEP.  Exercises were reviewed and Leslie was able to demonstrate them prior to the end of the session. Leslie demonstrated good understanding of the home exercise program provided.     See EMR under Patient Instructions for exercises provided  5/17/2023 .        Assessment     Patient tolerated exercises well. Needs verbal cues for hand placement.     Leslie is progressing towards her goals and there are no updates to goals at this time. Patient prognosis is Good.     Patient will continue to benefit from skilled outpatient occupational therapy to address the deficits listed in the problem list on initial evaluation provide patient/family education and to maximize patient's level of independence in the home and community environment.     Patient's spiritual, cultural and educational needs considered and patient agreeable to plan of care and goals.    Anticipated barriers to occupational therapy: Cognition, home exercise program compliance    Goals:     Short Term Goals:  6 weeks  Progress    Pain: Patient will demonstrate improved pain by reports of less than or equal to 7/10 worst pain on the verbal rating scale in order to progress toward  maximal functional ability and improve quality of life. PC   Function: Patient will demonstrate improved function as indicated by a functional limitation score of less than or equal to 36 out of 100 on FOTO. PC   Mobility: Patient will improve active range of motion to 50% of stated goals, listed in objective measures above, in order to progress towards independence with functional activities.  PC   Strength: Patient will improve strength to 50% of stated goals, listed in objective measures above, in order to progress towards independence with functional activities.  PC   HEP: Patient will demonstrate independence with home exercise program in order to progress toward functional independence. PC      Long Term Goals:  12 weeks  Progress   Pain: Patient will demonstrate improved pain by reports of less than or equal to 6/10 worst pain on the verbal rating scale in order to progress toward maximal functional ability and improve quality of life.   PC   Function: Patient will demonstrate improved function as indicated by a functional limitation score of less than or equal to 36 out of 100 on FOTO. PC   Mobility: Patient will improve active range of motion to stated goals, listed in objective measures above, in order to return to maximal functional potential and improve quality of life. PC   Strength: Patient will improve strength to stated goals, listed in objective measures above, in order to improve functional independence and quality of life. PC   Patient will return to normal ADL's, IADL's, community involvement, recreational activities, and work-related activities with less than or equal to 5/10 pain and maximal function.  PC      Goals Key:  PC= Progressing/Continue;       PM= Partially Met;       GM= Goal Met,      DC= Discontinue    Plan     Continue Plan of Care (POC) and progress per patient tolerance.      Ivana Gallo, OT  ,OTD, OTR/L

## 2023-07-25 NOTE — PROGRESS NOTES
[facility]   Physical Therapy Treatment Note + UPOC    Name: Leslie CASTELLON [facility] Number: [id number]    Therapy Diagnosis:   Encounter Diagnosis   Name Primary?    Impaired functional mobility, balance, gait, and endurance Yes     Physician: Sepideh Peters,*    Visit Date: 7/24/2023    Physician Orders: PT Eval and Treat   Medical Diagnosis from Referral: R29.6 (ICD-10-CM) - Falls frequently  Evaluation Date: 2/27/2023  Authorization Period Expiration: 2/17/24  Plan of Care Expiration: 4/28/23  Progress Note Due: 5/6/23  Visit # / Visits authorized: 21/25 (+eval)  FOTO: 0/3 not appropriate due to poor insight and cognitive deficits     Precautions: Standard and Fall     PTA Visit #: 0/5     Time In: 1000  Time Out: 1100  Total Billable Time: 60 minutes    SUBJECTIVE     Pt reports: Patient reports that she continues to experience pain relief at the lower back and right knee.  There is still some report of subluxation at times.  Response to previous treatment: She responded well to last session.   Functional change: She reports no functional change since last session.     Pain: 3/10  Location: right knee and lower back    OBJECTIVE     Objective Measures updated at progress report unless specified.     Update:   Functional Assessments:  Test Eval Score 3/30/23 4/27/23 7/10   TUG 15.35 seconds 13.98 seconds NT due to patient with wedged heel shoes today 22.57 w/o walker  15.26 w/ walker   30 s sit to stand    7 reps   5 x Sit to Stand 23.55 seconds 14.14 seconds 12.45 seconds 25s   4 Square Step Test 26 seconds NT NT due to patient with wedged heel shoes today            Patient reports a score of 46/56 on the Encarnacion Balance Assessment on 4/27/23.              Treatment     Leslie received the treatments listed below:      THERAPEUTIC EXERCISES to develop strength, endurance, ROM, flexibility, posture, and core stabilization for (45) minutes including:    Intervention Performed Today     Supine Stretches       x - 3x30 seconds bilateral for each:  - lower trunk rotation   - single knee to chest  - hamstring/ heelcord   Seated Stretches  - 3x30 seconds bilateral for each:  - hamstring/ heelcord  - hip adductor   Standing Stretches  - 3x15 seconds bilateral for each:  - IT band   - heelcord    Seated lower extremity exercises                 x - hip flexion 2x10 bilateral   - hamstring curl 2x10 bilateral   - dorsiflexion/ plantarflexion 2x10 bilateral with green band  - eversion 1x10 bilateral   - toe curls and flares 1x20 bilateral   - hip adduction against ball 1x10 with 3 second hold  - hip abduction against belt 1x10 with 3 second hold  Clam shells red theraband sitting - 2x10  Long arc quads - 5# - 10 reps switching x 3 minutes   Standing lower extremity exercises  X  X  X  x - hip flexion 2x10 bilateral   - hip abduction 2x10 bilateral   - hip extension 2x10 bilateral   - heel raise 2x10    NuStep for endurance  - Level 2, 10 minutes   Bicycle for endurance and ROM x - Level 1 x 6 minutes   Supine  - Quad set with short arc quad 2x10 with 3 second hold   - dorsiflexion against green band 2x10 bilateral    Lower Trunk Rotations  10 second holds x 10   Pelvic tilts  10 second holds x 10   Bridges   2x10   Abdominal Bracing  Knee to chest - 1x10 bilateral    Sidelying hip abduction  2x10   Straight leg raise   - 1x10 bilateral    Neoprene wrap to Right knee for all exercises    neuromuscular re-education activities to improve: Balance, Coordination, Kinesthetic, Sense, Proprioception, and Posture for (15) minutes. The following activities were included:    Intervention Performed Today    Air Ex Beam        - Tandem holds 5 seconds, 3x's   - Tandem walking forward/backward 3x's each  - Side stepping 3x's each direction  - tandem walking over hurdles forward and side stepping 2x's each  - heel lifts and toe lifts off of beam with 2-0 hand supports   - single leg standing while tapping opposite  foot forward and back on beam   Orange Rubber Beam    - Tandem holds 3 seconds, 3x's bilateral   - Tandem walking forward 4x's   - Side stepping 2x's each direction  - single leg standing while tapping opposite foot forward and back on beam   Air Ex Pad              - Weight shifts Left to Right 2x10  - Weight shifts forward/back 2x10  - single leg standing holds   - Eyes closed 10 seconds  - backward pivot steps with 1 hand support 1x8 bilateral   - standing on pad while toss/catching ball to wall 1x8  - One lower extremity on pad and opposite lower extremity on step tossing ball to wall    Hand paddled per Neuro LEONIE technique and for entirety of session     Core Exercises in supine  Bilateral lower extremities over Tball for each:   - lower trunk rotation 2x10  - bilateral knees to chest 2x10  - posterior pelvic tilt to bridge 2x10   Parallel bars      - walking backward 4x8 ft with 2-1 hand support  - braided walking 4x's length of bars  - narrow base standing without hand support performing head turns/nods 1x5 each, arm reach/lifts 1x5 each   Trunk extension for posture  - 2x10 over pillow and ball at low back    Sit to squat without hand support  2x10 from progressively lowered mat   Step ups  6 inch step  - 2 sets of 1 minute on each leg   Functional Re-Tests  - see above   Encarnacion Balance Scale  - see above   Agility Drills on Turf  - side stepping 2x20 ft  - backward walking 2x20 ft - without shoes  - tandem walking 2x15 ft - without shoes   Gait x Performed without assistive device but with supervision   Tandem stance x 2x10 with hand touches   Step-Ups x 4 inch step x 1m       THERAPEUTIC ACTIVITIES to improve dynamic and functional performance for () minutes including:    Intervention Performed Today    Patient and her granddaughter educated on Home Exercise Program for balance.  Also educated on wearing tennis shoes to future sessions.  Discussed the process of changing schedule to get her seen by an  ortho PT 1 day/week then by me 1day/week  - Patient and granddaughter verbalized good understanding of education provided.                                              MANUAL THERAPY TECHNIQUES were applied for (15) minutes, including:    Manual Intervention Performed Today    Soft Tissue Mobilization  Bilateral thoracolumbar spine   Joint Mobilizations [x] Patellar mobs, tibial ER/IR with knee extension/flexion    []     []    Functional Dry Needling  [] Bilateral thoracolumbar spine     Plan for Next Visit: Continue as needed         Gait Training: to improve functional mobility and safety for  () minutes, including:  - working on increasing step length and upright posture with gait sequence, ambulating 200ft and 100ft with contact guard assist for safety awareness.     Patient Education and Home Exercises     Home Exercises Provided and Patient Education Provided     Education provided:   - 3/14/23 Patient educated on Home Exercise Program for balance   - 3/16/23 Patient encouraged to stay compliant with Home Exercise Program   - 4/27/23 discussed continuing with [facility] PT for dry needling 1 day/week for the next 4 weeks.   Written Home Exercises Provided: yes. Exercises were reviewed and Leslie was able to demonstrate them prior to the end of the session.  Leslie verbalized good  understanding of the education provided. See EMR under Patient Instructions for exercises provided during therapy sessions    ASSESSMENT     Patient reports that pain remains decreased.  There is still subluxation at the tibiofemoral joint from 30 to 0 deg extension.  When the tibia is placed into a varus angulation and internal rotation, there is less frequency toward the subluxation.  She may benefit from an  type brace to prevent the tibial abduction and external rotation,    Leslie Is progressing well towards her goals.   Pt prognosis is Good.     Pt will continue to benefit from skilled outpatient physical therapy to address the  deficits listed in the problem list box on initial evaluation, provide pt/family education and to maximize pt's level of independence in the home and community environment.     Pt's spiritual, cultural and educational needs considered and pt agreeable to plan of care and goals.     Anticipated barriers to physical therapy: co-morbidities, sedentary lifestyle, chronicity of condition, and lack of understanding of condition     GOALS:     Short Term Goals:  4 weeks Progress    Function: Patient will demonstrate improved function as indicated by a functional limitation score improved by 3 points from initial measure.  PC   Strength: Patient will demonstrate improved strength by performing 5x sit to  20 seconds in order to progress towards independence with functional activities.  Met   Balance: Complete Encarnacion Balance Scale and set goals accordingly.  Met   Coordination: Patient will demonstrate improved time of 13 sec on TUG to demonstrate improved coordination and safety with mobility. PC   HEP: Patient will demonstrate independence with HEP in order to progress toward functional independence.  Met   Determine the need of Custom Wheelchair Eval to improve patient's positioning and independence with pressure reliefs and independence with home and community mobility to decrease burden of care. D/C not needed at this time.              Long Term Goals:  8 weeks Progress   Function: Patient will demonstrate improved function as indicated by a functional limitation score improved 5 points from initial measure.  PC   Strength: Patient will demonstrate improved strength by performing 5x sit to  17 seconds in order to progress towards independence with functional activities.  Met   Patient will return to ADL's, IADL's, community involvement, recreational activities, and work-related activities with less than or equal to 5/10 pain and maximal function.  PC   Balance: Improve Encarnacion Balance score to 50/56 to  demonstrate improved balance and decreased risk of falls.  PC   Coordination: Patient will demonstrate improved time of 11 sec on TUG to demonstrate improved coordination and safety with mobility. PC   HEP: Patient educated on HEP in preparation for d/c verbalizing and demonstrating good understanding of topics discussed.   PC            Goals Key:  PC= progressing/continue;        PM= partially met;             DC= discontinue    PLAN     Continue Plan of Care (POC) and progress per patient tolerance. See Treatment section for exercise progression.    Leoncio Lujan, PT

## 2023-07-26 ENCOUNTER — PATIENT MESSAGE (OUTPATIENT)
Dept: CARDIOLOGY | Facility: CLINIC | Age: 83
End: 2023-07-26
Payer: MEDICARE

## 2023-07-31 ENCOUNTER — EXTERNAL CHRONIC CARE MANAGEMENT (OUTPATIENT)
Dept: PRIMARY CARE CLINIC | Facility: CLINIC | Age: 83
End: 2023-07-31
Payer: MEDICARE

## 2023-07-31 PROCEDURE — 99439 PR CHRONIC CARE MGMT, EA ADDTL 20 MIN: ICD-10-PCS | Mod: S$PBB,,, | Performed by: FAMILY MEDICINE

## 2023-07-31 PROCEDURE — 99490 CHRNC CARE MGMT STAFF 1ST 20: CPT | Mod: PBBFAC | Performed by: FAMILY MEDICINE

## 2023-07-31 PROCEDURE — 99439 CHRNC CARE MGMT STAF EA ADDL: CPT | Mod: PBBFAC,27 | Performed by: FAMILY MEDICINE

## 2023-07-31 PROCEDURE — 99439 CHRNC CARE MGMT STAF EA ADDL: CPT | Mod: S$PBB,,, | Performed by: FAMILY MEDICINE

## 2023-07-31 PROCEDURE — 99490 PR CHRONIC CARE MGMT, 1ST 20 MIN: ICD-10-PCS | Mod: S$PBB,,, | Performed by: FAMILY MEDICINE

## 2023-07-31 PROCEDURE — 99490 CHRNC CARE MGMT STAFF 1ST 20: CPT | Mod: S$PBB,,, | Performed by: FAMILY MEDICINE

## 2023-08-01 DIAGNOSIS — I42.8 NONISCHEMIC CARDIOMYOPATHY: ICD-10-CM

## 2023-08-01 RX ORDER — FUROSEMIDE 20 MG/1
20 TABLET ORAL
Qty: 30 TABLET | Refills: 11 | Status: SHIPPED | OUTPATIENT
Start: 2023-08-01

## 2023-08-02 NOTE — ASSESSMENT & PLAN NOTE
Nerve stimulator      Cosentyx Counseling:  I discussed with the patient the risks of Cosentyx including but not limited to worsening of Crohn's disease, immunosuppression, allergic reactions and infections.  The patient understands that monitoring is required including a PPD at baseline and must alert us or the primary physician if symptoms of infection or other concerning signs are noted.

## 2023-08-11 ENCOUNTER — CLINICAL SUPPORT (OUTPATIENT)
Dept: REHABILITATION | Facility: HOSPITAL | Age: 83
End: 2023-08-11
Payer: MEDICARE

## 2023-08-11 DIAGNOSIS — R29.898 FINE MOTOR IMPAIRMENT: ICD-10-CM

## 2023-08-11 DIAGNOSIS — M25.60 RANGE OF MOTION DEFICIT: Primary | ICD-10-CM

## 2023-08-11 DIAGNOSIS — Z74.09 IMPAIRED FUNCTIONAL MOBILITY, BALANCE, GAIT, AND ENDURANCE: Primary | ICD-10-CM

## 2023-08-11 DIAGNOSIS — M62.81 DECREASED MUSCLE STRENGTH: ICD-10-CM

## 2023-08-11 DIAGNOSIS — Z78.9 DECREASED ACTIVITIES OF DAILY LIVING (ADL): ICD-10-CM

## 2023-08-11 DIAGNOSIS — R29.898 DECREASED GRIP STRENGTH OF LEFT HAND: ICD-10-CM

## 2023-08-11 DIAGNOSIS — R29.818 FINE MOTOR IMPAIRMENT: ICD-10-CM

## 2023-08-11 PROCEDURE — 97112 NEUROMUSCULAR REEDUCATION: CPT

## 2023-08-11 PROCEDURE — 97110 THERAPEUTIC EXERCISES: CPT

## 2023-08-11 PROCEDURE — 97140 MANUAL THERAPY 1/> REGIONS: CPT

## 2023-08-11 PROCEDURE — 97530 THERAPEUTIC ACTIVITIES: CPT

## 2023-08-11 NOTE — PROGRESS NOTES
[facility]   Physical Therapy Treatment Note + UPOC    Name: Leslie CASTELLON [facility] Number: [id number]    Therapy Diagnosis:   Encounter Diagnosis   Name Primary?    Impaired functional mobility, balance, gait, and endurance Yes     Physician: Angeles Carson MD    Visit Date: 8/11/2023    Physician Orders: PT Eval and Treat   Medical Diagnosis from Referral: R29.6 (ICD-10-CM) - Falls frequently  Evaluation Date: 2/27/2023  Authorization Period Expiration: 2/17/24  Plan of Care Expiration: 4/28/23  Progress Note Due: 5/6/23  Visit # / Visits authorized: 21/25 (+eval)  FOTO: 0/3 not appropriate due to poor insight and cognitive deficits     Precautions: Standard and Fall     PTA Visit #: 0/5     Time In: 1045  Time Out: 1145  Total Billable Time: 60 minutes    SUBJECTIVE     Pt reports: Patient reports that she continues to experience pain relief at the lower back and right knee.  There is still some report of subluxation at times.  Response to previous treatment: She responded well to last session.   Functional change: She reports no functional change since last session.     Pain: 3/10  Location: right knee and lower back    OBJECTIVE     Objective Measures updated at progress report unless specified.     Update:   Functional Assessments:  Test Eval Score 3/30/23 4/27/23 7/10 8/11   TUG 15.35 seconds 13.98 seconds NT due to patient with wedged heel shoes today 22.57 w/o walker  15.26 w/ walker 15.49 w/o walker  16.09 with walker   30 s sit to stand    7 reps 5   5 x Sit to Stand 23.55 seconds 14.14 seconds 12.45 seconds 25s 30s   4 Square Step Test 26 seconds NT NT due to patient with wedged heel shoes today  NT           Patient reports a score of 46/56 on the Encarnacion Balance Assessment on 4/27/23.              Treatment     Leslie received the treatments listed below:      THERAPEUTIC EXERCISES to develop strength, endurance, ROM, flexibility, posture, and core stabilization for (45)  minutes including:    Intervention Performed Today    Supine Stretches       x - 3x30 seconds bilateral for each:  - lower trunk rotation   - single knee to chest  - hamstring/ heelcord   Seated Stretches  - 3x30 seconds bilateral for each:  - hamstring/ heelcord  - hip adductor   Standing Stretches  - 3x15 seconds bilateral for each:  - IT band   - heelcord    Seated lower extremity exercises                 x - hip flexion 2x10 bilateral   - hamstring curl 2x10 bilateral   - dorsiflexion/ plantarflexion 2x10 bilateral with green band  - eversion 1x10 bilateral   - toe curls and flares 1x20 bilateral   - hip adduction against ball 1x10 with 3 second hold  - hip abduction against belt 1x10 with 3 second hold  Clam shells red theraband sitting - 2x10  Long arc quads - 5# - 10 reps switching x 3 minutes   Standing lower extremity exercises  X  X  X  x - hip flexion 2x10 bilateral   - hip abduction 2x10 bilateral   - hip extension 2x10 bilateral   - heel raise 2x10    NuStep for endurance  - Level 2, 10 minutes   Bicycle for endurance and ROM x - Level 1 x 6 minutes   Supine  - Quad set with short arc quad 2x10 with 3 second hold   - dorsiflexion against green band 2x10 bilateral    Lower Trunk Rotations  10 second holds x 10   Pelvic tilts  10 second holds x 10   Bridges   2x10   Abdominal Bracing  Knee to chest - 1x10 bilateral    Sidelying hip abduction  2x10   Straight leg raise   - 1x10 bilateral    Neoprene wrap to Right knee for all exercises    neuromuscular re-education activities to improve: Balance, Coordination, Kinesthetic, Sense, Proprioception, and Posture for (15) minutes. The following activities were included:    Intervention Performed Today    Air Ex Beam        - Tandem holds 5 seconds, 3x's   - Tandem walking forward/backward 3x's each  - Side stepping 3x's each direction  - tandem walking over hurdles forward and side stepping 2x's each  - heel lifts and toe lifts off of beam with 2-0 hand  supports   - single leg standing while tapping opposite foot forward and back on beam   Orange Rubber Beam    - Tandem holds 3 seconds, 3x's bilateral   - Tandem walking forward 4x's   - Side stepping 2x's each direction  - single leg standing while tapping opposite foot forward and back on beam   Air Ex Pad              - Weight shifts Left to Right 2x10  - Weight shifts forward/back 2x10  - single leg standing holds   - Eyes closed 10 seconds  - backward pivot steps with 1 hand support 1x8 bilateral   - standing on pad while toss/catching ball to wall 1x8  - One lower extremity on pad and opposite lower extremity on step tossing ball to wall    Hand paddled per Neuro LEONIE technique and for entirety of session     Core Exercises in supine  Bilateral lower extremities over Tball for each:   - lower trunk rotation 2x10  - bilateral knees to chest 2x10  - posterior pelvic tilt to bridge 2x10   Parallel bars      - walking backward 4x8 ft with 2-1 hand support  - braided walking 4x's length of bars  - narrow base standing without hand support performing head turns/nods 1x5 each, arm reach/lifts 1x5 each   Trunk extension for posture  - 2x10 over pillow and ball at low back    Sit to squat without hand support  2x10 from progressively lowered mat   Step ups  6 inch step  - 2 sets of 1 minute on each leg   Functional Re-Tests  - see above   Encarnacion Balance Scale  - see above   Agility Drills on Turf  - side stepping 2x20 ft  - backward walking 2x20 ft - without shoes  - tandem walking 2x15 ft - without shoes   Gait x Performed without assistive device but with supervision   Tandem stance x 2x10 with hand touches   Step-Ups x 4 inch step x 1m       THERAPEUTIC ACTIVITIES to improve dynamic and functional performance for () minutes including:    Intervention Performed Today    Patient and her granddaughter educated on Home Exercise Program for balance.  Also educated on wearing tennis shoes to future sessions.  Discussed the  "process of changing schedule to get her seen by an ortho PT 1 day/week then by me 1day/week  - Patient and granddaughter verbalized good understanding of education provided.                                              MANUAL THERAPY TECHNIQUES were applied for (15) minutes, including:    Manual Intervention Performed Today    Soft Tissue Mobilization  Bilateral thoracolumbar spine   Joint Mobilizations [x] Patellar mobs, tibial ER/IR with knee extension/flexion    []     []    Functional Dry Needling  [] Bilateral thoracolumbar spine     Plan for Next Visit: Continue as needed         Gait Training: to improve functional mobility and safety for  () minutes, including:  - working on increasing step length and upright posture with gait sequence, ambulating 200ft and 100ft with contact guard assist for safety awareness.     Patient Education and Home Exercises     Home Exercises Provided and Patient Education Provided     Education provided:   - 3/14/23 Patient educated on Home Exercise Program for balance   - 3/16/23 Patient encouraged to stay compliant with Home Exercise Program   - 4/27/23 discussed continuing with [facility] PT for dry needling 1 day/week for the next 4 weeks.   Written Home Exercises Provided: yes. Exercises were reviewed and Leslie was able to demonstrate them prior to the end of the session.  Leslie verbalized good  understanding of the education provided. See EMR under Patient Instructions for exercises provided during therapy sessions    ASSESSMENT     Patient reports that pain at the knee has increased a bit since she completed cortisone injection.  She does report that pain does continue to "pop" and become unstable for a very short time.  It does appear that the "pop" is a tibiofemoral subluxation.  X-ray reveals that the tibia is translated laterally.  Spoke about bracing and the patient will use the previous brace she has at home.  Not sure if she would benefit from and  brace.  This is " th final visit.  The patient's family is looking into [facility] [address] as a place to continue to exercise.    Leslie Is progressing well towards her goals.   Pt prognosis is Good.     Pt will continue to benefit from skilled outpatient physical therapy to address the deficits listed in the problem list box on initial evaluation, provide pt/family education and to maximize pt's level of independence in the home and community environment.     Pt's spiritual, cultural and educational needs considered and pt agreeable to plan of care and goals.     Anticipated barriers to physical therapy: co-morbidities, sedentary lifestyle, chronicity of condition, and lack of understanding of condition     GOALS:     Short Term Goals:  4 weeks Progress    Function: Patient will demonstrate improved function as indicated by a functional limitation score improved by 3 points from initial measure.  PC   Strength: Patient will demonstrate improved strength by performing 5x sit to  20 seconds in order to progress towards independence with functional activities.  Met   Balance: Complete Encarnacion Balance Scale and set goals accordingly.  Met   Coordination: Patient will demonstrate improved time of 13 sec on TUG to demonstrate improved coordination and safety with mobility. PC   HEP: Patient will demonstrate independence with HEP in order to progress toward functional independence.  Met   Determine the need of Custom Wheelchair Eval to improve patient's positioning and independence with pressure reliefs and independence with home and community mobility to decrease burden of care. D/C not needed at this time.              Long Term Goals:  8 weeks Progress   Function: Patient will demonstrate improved function as indicated by a functional limitation score improved 5 points from initial measure.  PC   Strength: Patient will demonstrate improved strength by performing 5x sit to  17 seconds in order to progress towards independence with  functional activities.  Met   Patient will return to ADL's, IADL's, community involvement, recreational activities, and work-related activities with less than or equal to 5/10 pain and maximal function.  PC   Balance: Improve Encarnacion Balance score to 50/56 to demonstrate improved balance and decreased risk of falls.  PC   Coordination: Patient will demonstrate improved time of 11 sec on TUG to demonstrate improved coordination and safety with mobility. PC   HEP: Patient educated on HEP in preparation for d/c verbalizing and demonstrating good understanding of topics discussed.   PC            Goals Key:  PC= progressing/continue;        PM= partially met;             DC= discontinue    PLAN     Patient will be discharged to continue with home exercises    Leoncio Lujan, PT

## 2023-08-11 NOTE — PROGRESS NOTES
[facility]  Occupational Therapy Progress and Treatment Note     Date: 8/11/2023  Name: Leslie CASTELLON [facility] Number: [id number]    Therapy Diagnosis:   Encounter Diagnoses   Name Primary?    Range of motion deficit Yes    Decreased  strength of left hand     Decreased muscle strength     Decreased activities of daily living (ADL)     Fine motor impairment        Physician: Angeles Carson MD    Physician Orders: Occupational Therapy to Evaluate and Treat   Medical Diagnosis: R29.898 (ICD-10-CM) - Poor fine motor skills  Surgical Procedure and Date: N/A     Evaluation Date: 5/17/2023  Insurance Authorization Period Expiration: 6/5/2023 - 12/31/2023  Plan of Care Certification Period: 5/17/2023 - 10/17/2023  Progress Note Due: 9/10/2023      Date of Return to MD: N/A     Visit # / Visits authorized: 7/25 (8 Episode Visits)   FOTO: 1/3     Precautions:  Standard    Time In: 10:00  Time Out: 10:45  Total Treatment Time: 45 minutes  Total Billable Time: 45 minutes    Subjective     Patient reports: She is having less difficulty with ADL's. She wants to do a few more sessions    she was compliant with home exercise program given last session.   Response to previous treatment: Tolerated well  Functional change: Improved knowledge of HEP    Pain: 0/10  Location: bilateral hands     Objective         Joint Evaluation  AROM  5/17/2023 AROM  5/17/2023 PROM   5/17/2023 AROM  8/11/2023 Goal     Left Right Right  Right Active    Shoulder Flexion 0-180 Within normal limits  90 110 105 110   Shoulder Abduction 0-180  90 110  105 110   Shoulder External Rotation 0-90  25 40 30      Shoulder Internal Rotation 0-90  45 60  50     Shoulder Extension 0-60  10 25  13     Shoulder Horizontal Adduction 0-90  Within normal limits  Within normal limits        Elbow Flexion 0-150          Elbow Extension 0          Wrist Flexion 0-80          Wrist Extension 0-70          Wrist Supination 0-80          Wrist Pronation  0-80          Wrist Ulnar Deviation 0-30          Wrist Radial Deviation 0-20             Fist: normal        Strength 5/17/2023 5/17/2023 8/11/2023     **within available ROM** Left Right Left Goal Left   Shoulder Flexion 3+/5 3-/5 4-/5 4/5   Shoulder Abduction 3+/5 3-/5  4/5   Shoulder External Rotation  3+/5 3-/5  4/5   Shoulder Internal Rotation 3+/5 3-/5  4/5   Shoulder Extension 3+/5 3-/5  4/5   Shoulder Horizontal Adduction 3+/5 3-/5  4/5   Elbow Flexion 3+/5 3+/5 4/5 4/5   Elbow Extension 3+/5 3+/5  4/5   Wrist Flexion 3+/5 3+/5  4/5   Wrist Extension 3+/5 3+/5  4/5   Supination 3+/5 3+/5  4/5   Pronation 3+/5 3+/5  4/5   Ulnar Deviation 3+/5 3+/5  4/5   Radial Deviation 3+/5 3+/5  4/5       Strength: (MUNDO Dynamometer in lbs.) Average 3 trials, Position II:       5/17/2023 5/17/2023 8/11/2023 8/11/2023 Goal     Left Right Left Right Left   Rung II 25# 35# 33# 37# 31#      Pinch Strength (Measured in psi)       5/17/2023 5/17/2023 8/11/2023 8/11/2023     Left Right Left Right   2pt Pinch 6 psi 7 psi 8 9   3pt Pinch 6 psi 7 psi 9 9   Lateral Pinch 9 psi 12 psi 13 15        Treatment     Supervised Modalities: Modalities such as hot/cold packs, traction, unattended electrical stimulation, paraffin bath, fluidotherapy to reduce pain and increase soft tissue extensibility. Leslie received (0) minutes of modalities listed below after being cleared for contraindications.  x = modalities performed     Supervised Modalities 8/11/2023                            Manual Therapy Techniques: Joint mobilizations, Soft tissue Mobilization, and mobilization with movement applied to the area listed below. Leslie received (0) minutes of interventions. x = intervention performed today    Manual Intervention 8/11/2023    Soft Tissue Mobilization     Joint Mobilizations     Mobilization with movement              Therapeutic Exercises: to develop strength, endurance, ROM, flexibility, posture and core stabilization. Leslie  received (15) minutes of exercises listed below. x = performed today * = level of progression of activity     Therapeutic Exercise 8/11/2023    Interossei  -green foam  -rubber bands x 20x bilateral    Digi flexion - red x 20x bilateral    Resistive pinch - red  -2pt  -3pt  -lateral x 20x bilateral    Thumb palmar   -adduction  -abduction x 20x bilateral    Thumb radial  -adduction  -abduction x 20x bilateral    Forearm exercise  - wrist flexion  -wrist extension  - supination  -pronation x 2lb        Therapeutic Activities: Everyday tasks to address the combined need of improved strength, range of motion, and endurance to achieve increased independence. While simulating these activities, the person is applying the gained skills of strength and improved range of motion in a practical way.  Leslie received (15) minutes of activities listed below. x = activities performed today * = level of progression of activity     Therapeutic Activities 8/11/2023    Velcro board  -lateral pinch  - dowel  x 20x bilateral    Opening and closing containers x 5 minutes, ergonomics               Neuromuscular Re-education: the use of functional strengthening, stretching, balancing and coordination activities that train the nerves and muscles to react and communicate to restore normal body movement patterns to increase self care independence. Leslie received (15) minutes of interventions listed below.  x = interventions performed today * = level of progression of activity     Neuromuscular Re-education 8/11/2023    Finger opposition  x 20x   Finger taps (extension) x 20x   Finger opposition with slides x    Table Top active assist range of motion  - shoulder flexion  - shoulder abduction  x 5x - 15 second holds     Self Care: Fundamental skills required to independently care for oneself. Activities of daily living such as eating, bathing, dressing, toileting, grooming, sleeping, transfers and mobility. Instrumental activities of daily  living such as driving, medication management, pet care, home management/cleaning, financial management, using the phone, meal preparation and shopping. Leslie received (0) minutes of interventions listed below.  x = interventions performed * = level of progression of activity     Self Care 8/11/2023                            Home Exercises and Education Provided     Education provided:   - home exercise program education provided  - progress towards goals     Written Home Exercises Provided: Patient instructed to cont prior HEP.  Exercises were reviewed and Leslie was able to demonstrate them prior to the end of the session. Leslie demonstrated good understanding of the home exercise program provided.     See EMR under Patient Instructions for exercises provided  5/17/2023 .        Assessment     Patient tolerated exercises well. Needs verbal cues for hand placement.     Leslie is progressing towards her goals and there are no updates to goals at this time. Patient prognosis is Good.     Patient will continue to benefit from skilled outpatient occupational therapy to address the deficits listed in the problem list on initial evaluation provide patient/family education and to maximize patient's level of independence in the home and community environment.     Patient's spiritual, cultural and educational needs considered and patient agreeable to plan of care and goals.    Anticipated barriers to occupational therapy: Cognition, home exercise program compliance    Goals:     Short Term Goals:  6 weeks  Progress    Pain: Patient will demonstrate improved pain by reports of less than or equal to 7/10 worst pain on the verbal rating scale in order to progress toward maximal functional ability and improve quality of life. GM   Function: Patient will demonstrate improved function as indicated by a functional limitation score of less than or equal to 36 out of 100 on FOTO. GM   Mobility: Patient will improve active range of motion  to 50% of stated goals, listed in objective measures above, in order to progress towards independence with functional activities.  GM   Strength: Patient will improve strength to 50% of stated goals, listed in objective measures above, in order to progress towards independence with functional activities.  GM   HEP: Patient will demonstrate independence with home exercise program in order to progress toward functional independence. GM      Long Term Goals:  12 weeks  Progress   Pain: Patient will demonstrate improved pain by reports of less than or equal to 6/10 worst pain on the verbal rating scale in order to progress toward maximal functional ability and improve quality of life.   GM   Function: Patient will demonstrate improved function as indicated by a functional limitation score of less than or equal to 36 out of 100 on FOTO. GM   Mobility: Patient will improve active range of motion to stated goals, listed in objective measures above, in order to return to maximal functional potential and improve quality of life. GM   Strength: Patient will improve strength to stated goals, listed in objective measures above, in order to improve functional independence and quality of life. PC   Patient will return to normal ADL's, IADL's, community involvement, recreational activities, and work-related activities with less than or equal to 5/10 pain and maximal function.  PC      Goals Key:  PC= Progressing/Continue;       PM= Partially Met;       GM= Goal Met,      DC= Discontinue    Plan     Continue Plan of Care (POC) and progress per patient tolerance.      Ivana Gallo, OT  ,OTD, OTR/L

## 2023-08-13 NOTE — PLAN OF CARE
[facility]   Physical Therapy Treatment Note + UPOC    Name: Leslie CASTELLON [facility] Number: [id number]    Therapy Diagnosis:   Encounter Diagnosis   Name Primary?    Impaired functional mobility, balance, gait, and endurance Yes     Physician: Angeles Carson MD    Visit Date: 8/11/2023    Physician Orders: PT Eval and Treat   Medical Diagnosis from Referral: R29.6 (ICD-10-CM) - Falls frequently  Evaluation Date: 2/27/2023  Authorization Period Expiration: 2/17/24  Plan of Care Expiration: 4/28/23  Progress Note Due: 5/6/23  Visit # / Visits authorized: 21/25 (+eval)  FOTO: 0/3 not appropriate due to poor insight and cognitive deficits     Precautions: Standard and Fall     PTA Visit #: 0/5     Time In: 1045  Time Out: 1145  Total Billable Time: 60 minutes    SUBJECTIVE     Pt reports: Patient reports that she continues to experience pain relief at the lower back and right knee.  There is still some report of subluxation at times.  Response to previous treatment: She responded well to last session.   Functional change: She reports no functional change since last session.     Pain: 3/10  Location: right knee and lower back    OBJECTIVE     Objective Measures updated at progress report unless specified.     Update:   Functional Assessments:  Test Eval Score 3/30/23 4/27/23 7/10 8/11   TUG 15.35 seconds 13.98 seconds NT due to patient with wedged heel shoes today 22.57 w/o walker  15.26 w/ walker 15.49 w/o walker  16.09 with walker   30 s sit to stand    7 reps 5   5 x Sit to Stand 23.55 seconds 14.14 seconds 12.45 seconds 25s 30s   4 Square Step Test 26 seconds NT NT due to patient with wedged heel shoes today  NT           Patient reports a score of 46/56 on the Encarnacion Balance Assessment on 4/27/23.              Treatment     Leslie received the treatments listed below:      THERAPEUTIC EXERCISES to develop strength, endurance, ROM, flexibility, posture, and core stabilization for (45)  minutes including:    Intervention Performed Today    Supine Stretches       x - 3x30 seconds bilateral for each:  - lower trunk rotation   - single knee to chest  - hamstring/ heelcord   Seated Stretches  - 3x30 seconds bilateral for each:  - hamstring/ heelcord  - hip adductor   Standing Stretches  - 3x15 seconds bilateral for each:  - IT band   - heelcord    Seated lower extremity exercises                 x - hip flexion 2x10 bilateral   - hamstring curl 2x10 bilateral   - dorsiflexion/ plantarflexion 2x10 bilateral with green band  - eversion 1x10 bilateral   - toe curls and flares 1x20 bilateral   - hip adduction against ball 1x10 with 3 second hold  - hip abduction against belt 1x10 with 3 second hold  Clam shells red theraband sitting - 2x10  Long arc quads - 5# - 10 reps switching x 3 minutes   Standing lower extremity exercises  X  X  X  x - hip flexion 2x10 bilateral   - hip abduction 2x10 bilateral   - hip extension 2x10 bilateral   - heel raise 2x10    NuStep for endurance  - Level 2, 10 minutes   Bicycle for endurance and ROM x - Level 1 x 6 minutes   Supine  - Quad set with short arc quad 2x10 with 3 second hold   - dorsiflexion against green band 2x10 bilateral    Lower Trunk Rotations  10 second holds x 10   Pelvic tilts  10 second holds x 10   Bridges   2x10   Abdominal Bracing  Knee to chest - 1x10 bilateral    Sidelying hip abduction  2x10   Straight leg raise   - 1x10 bilateral    Neoprene wrap to Right knee for all exercises    neuromuscular re-education activities to improve: Balance, Coordination, Kinesthetic, Sense, Proprioception, and Posture for (15) minutes. The following activities were included:    Intervention Performed Today    Air Ex Beam        - Tandem holds 5 seconds, 3x's   - Tandem walking forward/backward 3x's each  - Side stepping 3x's each direction  - tandem walking over hurdles forward and side stepping 2x's each  - heel lifts and toe lifts off of beam with 2-0 hand  supports   - single leg standing while tapping opposite foot forward and back on beam   Orange Rubber Beam    - Tandem holds 3 seconds, 3x's bilateral   - Tandem walking forward 4x's   - Side stepping 2x's each direction  - single leg standing while tapping opposite foot forward and back on beam   Air Ex Pad              - Weight shifts Left to Right 2x10  - Weight shifts forward/back 2x10  - single leg standing holds   - Eyes closed 10 seconds  - backward pivot steps with 1 hand support 1x8 bilateral   - standing on pad while toss/catching ball to wall 1x8  - One lower extremity on pad and opposite lower extremity on step tossing ball to wall    Hand paddled per Neuro LEONIE technique and for entirety of session     Core Exercises in supine  Bilateral lower extremities over Tball for each:   - lower trunk rotation 2x10  - bilateral knees to chest 2x10  - posterior pelvic tilt to bridge 2x10   Parallel bars      - walking backward 4x8 ft with 2-1 hand support  - braided walking 4x's length of bars  - narrow base standing without hand support performing head turns/nods 1x5 each, arm reach/lifts 1x5 each   Trunk extension for posture  - 2x10 over pillow and ball at low back    Sit to squat without hand support  2x10 from progressively lowered mat   Step ups  6 inch step  - 2 sets of 1 minute on each leg   Functional Re-Tests  - see above   Encarnacion Balance Scale  - see above   Agility Drills on Turf  - side stepping 2x20 ft  - backward walking 2x20 ft - without shoes  - tandem walking 2x15 ft - without shoes   Gait x Performed without assistive device but with supervision   Tandem stance x 2x10 with hand touches   Step-Ups x 4 inch step x 1m       THERAPEUTIC ACTIVITIES to improve dynamic and functional performance for () minutes including:    Intervention Performed Today    Patient and her granddaughter educated on Home Exercise Program for balance.  Also educated on wearing tennis shoes to future sessions.  Discussed the  "process of changing schedule to get her seen by an ortho PT 1 day/week then by me 1day/week  - Patient and granddaughter verbalized good understanding of education provided.                                              MANUAL THERAPY TECHNIQUES were applied for (15) minutes, including:    Manual Intervention Performed Today    Soft Tissue Mobilization  Bilateral thoracolumbar spine   Joint Mobilizations [x] Patellar mobs, tibial ER/IR with knee extension/flexion    []     []    Functional Dry Needling  [] Bilateral thoracolumbar spine     Plan for Next Visit: Continue as needed         Gait Training: to improve functional mobility and safety for  () minutes, including:  - working on increasing step length and upright posture with gait sequence, ambulating 200ft and 100ft with contact guard assist for safety awareness.     Patient Education and Home Exercises     Home Exercises Provided and Patient Education Provided     Education provided:   - 3/14/23 Patient educated on Home Exercise Program for balance   - 3/16/23 Patient encouraged to stay compliant with Home Exercise Program   - 4/27/23 discussed continuing with James PT for dry needling 1 day/week for the next 4 weeks.   Written Home Exercises Provided: yes. Exercises were reviewed and Leslie was able to demonstrate them prior to the end of the session.  Leslie verbalized good  understanding of the education provided. See EMR under Patient Instructions for exercises provided during therapy sessions    ASSESSMENT     Patient reports that pain at the knee has increased a bit since she completed cortisone injection.  She does report that pain does continue to "pop" and become unstable for a very short time.  It does appear that the "pop" is a tibiofemoral subluxation.  X-ray reveals that the tibia is translated laterally.  Spoke about bracing and the patient will use the previous brace she has at home.  Not sure if she would benefit from and  brace.  This is " th final visit.  The patient's family is looking into [facility] [address] as a place to continue to exercise.    Leslie Is progressing well towards her goals.   Pt prognosis is Good.     Pt will continue to benefit from skilled outpatient physical therapy to address the deficits listed in the problem list box on initial evaluation, provide pt/family education and to maximize pt's level of independence in the home and community environment.     Pt's spiritual, cultural and educational needs considered and pt agreeable to plan of care and goals.     Anticipated barriers to physical therapy: co-morbidities, sedentary lifestyle, chronicity of condition, and lack of understanding of condition     GOALS:     Short Term Goals:  4 weeks Progress    Function: Patient will demonstrate improved function as indicated by a functional limitation score improved by 3 points from initial measure.  PC   Strength: Patient will demonstrate improved strength by performing 5x sit to  20 seconds in order to progress towards independence with functional activities.  Met   Balance: Complete Encarnacion Balance Scale and set goals accordingly.  Met   Coordination: Patient will demonstrate improved time of 13 sec on TUG to demonstrate improved coordination and safety with mobility. PC   HEP: Patient will demonstrate independence with HEP in order to progress toward functional independence.  Met   Determine the need of Custom Wheelchair Eval to improve patient's positioning and independence with pressure reliefs and independence with home and community mobility to decrease burden of care. D/C not needed at this time.              Long Term Goals:  8 weeks Progress   Function: Patient will demonstrate improved function as indicated by a functional limitation score improved 5 points from initial measure.  PC   Strength: Patient will demonstrate improved strength by performing 5x sit to  17 seconds in order to progress towards independence with  functional activities.  Met   Patient will return to ADL's, IADL's, community involvement, recreational activities, and work-related activities with less than or equal to 5/10 pain and maximal function.  PC   Balance: Improve Encarnacion Balance score to 50/56 to demonstrate improved balance and decreased risk of falls.  PC   Coordination: Patient will demonstrate improved time of 11 sec on TUG to demonstrate improved coordination and safety with mobility. PC   HEP: Patient educated on HEP in preparation for d/c verbalizing and demonstrating good understanding of topics discussed.   PC            Goals Key:  PC= progressing/continue;        PM= partially met;             DC= discontinue    PLAN     Patient will be discharged to continue with home exercises    Leoncio Lujan, PT

## 2023-08-18 ENCOUNTER — CLINICAL SUPPORT (OUTPATIENT)
Dept: REHABILITATION | Facility: HOSPITAL | Age: 83
End: 2023-08-18
Payer: MEDICARE

## 2023-08-18 DIAGNOSIS — M62.81 DECREASED MUSCLE STRENGTH: ICD-10-CM

## 2023-08-18 DIAGNOSIS — Z78.9 DECREASED ACTIVITIES OF DAILY LIVING (ADL): ICD-10-CM

## 2023-08-18 DIAGNOSIS — R29.818 FINE MOTOR IMPAIRMENT: ICD-10-CM

## 2023-08-18 DIAGNOSIS — R29.898 FINE MOTOR IMPAIRMENT: ICD-10-CM

## 2023-08-18 DIAGNOSIS — M25.60 RANGE OF MOTION DEFICIT: Primary | ICD-10-CM

## 2023-08-18 DIAGNOSIS — R29.898 DECREASED GRIP STRENGTH OF LEFT HAND: ICD-10-CM

## 2023-08-18 PROCEDURE — 97110 THERAPEUTIC EXERCISES: CPT

## 2023-08-18 PROCEDURE — 97530 THERAPEUTIC ACTIVITIES: CPT

## 2023-08-18 PROCEDURE — 97112 NEUROMUSCULAR REEDUCATION: CPT

## 2023-08-18 NOTE — PROGRESS NOTES
[facility]  Occupational Therapy Treatment Note     Date: 8/18/2023  Name: Leslie CASTELLON [facility] Number: [id number]    Therapy Diagnosis:   Encounter Diagnoses   Name Primary?    Range of motion deficit Yes    Decreased  strength of left hand     Decreased muscle strength     Decreased activities of daily living (ADL)     Fine motor impairment        Physician: Angeles Carson MD    Physician Orders: Occupational Therapy to Evaluate and Treat   Medical Diagnosis: R29.898 (ICD-10-CM) - Poor fine motor skills  Surgical Procedure and Date: N/A     Evaluation Date: 5/17/2023  Insurance Authorization Period Expiration: 6/5/2023 - 12/31/2023  Plan of Care Certification Period: 5/17/2023 - 10/17/2023  Progress Note Due: 9/10/2023      Date of Return to MD: N/A     Visit # / Visits authorized: 8/25 (9 Episode Visits)   FOTO: 1/3     Precautions:  Standard    Time In: 11:45  Time Out: 12:30  Total Treatment Time: 45 minutes  Total Billable Time: 45 minutes    Subjective     Patient reports: She is doing well. She is able to open containers now    she was compliant with home exercise program given last session.   Response to previous treatment: Tolerated well  Functional change: Improved knowledge of HEP    Pain: 0/10  Location: bilateral hands     Objective     Please see progress note dated 8/11/2023 for updated objective measures    Treatment     Supervised Modalities: Modalities such as hot/cold packs, traction, unattended electrical stimulation, paraffin bath, fluidotherapy to reduce pain and increase soft tissue extensibility. Leslie received (0) minutes of modalities listed below after being cleared for contraindications.  x = modalities performed     Supervised Modalities 8/18/2023                            Manual Therapy Techniques: Joint mobilizations, Soft tissue Mobilization, and mobilization with movement applied to the area listed below. Leslie received (0) minutes of interventions. x =  intervention performed today    Manual Intervention 8/18/2023    Soft Tissue Mobilization     Joint Mobilizations     Mobilization with movement              Therapeutic Exercises: to develop strength, endurance, ROM, flexibility, posture and core stabilization. Leslie received (15) minutes of exercises listed below. x = performed today * = level of progression of activity     Therapeutic Exercise 8/18/2023    Interossei  -green foam  -rubber bands x 20x bilateral    Digi flexion - red x 20x bilateral    Resistive pinch - red  -2pt  -3pt  -lateral x 20x bilateral    Thumb palmar   -adduction  -abduction x 20x bilateral    Thumb radial  -adduction  -abduction x 20x bilateral    Forearm exercise  - wrist flexion  -wrist extension  - supination  -pronation x 2lb        Therapeutic Activities: Everyday tasks to address the combined need of improved strength, range of motion, and endurance to achieve increased independence. While simulating these activities, the person is applying the gained skills of strength and improved range of motion in a practical way.  Leslie received (15) minutes of activities listed below. x = activities performed today * = level of progression of activity     Therapeutic Activities 8/18/2023    Velcro board  -lateral pinch  - dowel  x 20x bilateral    Opening and closing containers x 5 minutes, ergonomics               Neuromuscular Re-education: the use of functional strengthening, stretching, balancing and coordination activities that train the nerves and muscles to react and communicate to restore normal body movement patterns to increase self care independence. Leslie received (15) minutes of interventions listed below.  x = interventions performed today * = level of progression of activity     Neuromuscular Re-education 8/18/2023    Finger opposition  x 20x   Finger taps (extension) x 20x   Finger opposition with slides x    Table Top active assist range of motion  - shoulder flexion  -  shoulder abduction  x 5x - 15 second holds     Self Care: Fundamental skills required to independently care for oneself. Activities of daily living such as eating, bathing, dressing, toileting, grooming, sleeping, transfers and mobility. Instrumental activities of daily living such as driving, medication management, pet care, home management/cleaning, financial management, using the phone, meal preparation and shopping. Leslie received (0) minutes of interventions listed below.  x = interventions performed * = level of progression of activity     Self Care 8/18/2023                            Home Exercises and Education Provided     Education provided:   - home exercise program education provided  - progress towards goals     Written Home Exercises Provided: Patient instructed to cont prior HEP.  Exercises were reviewed and Leslie was able to demonstrate them prior to the end of the session. Leslie demonstrated good understanding of the home exercise program provided.     See EMR under Patient Instructions for exercises provided  5/17/2023 .        Assessment     Patient tolerated exercises well. Needs verbal cues for hand placement.     Leslie is progressing towards her goals and there are no updates to goals at this time. Patient prognosis is Good.     Patient will continue to benefit from skilled outpatient occupational therapy to address the deficits listed in the problem list on initial evaluation provide patient/family education and to maximize patient's level of independence in the home and community environment.     Patient's spiritual, cultural and educational needs considered and patient agreeable to plan of care and goals.    Anticipated barriers to occupational therapy: Cognition, home exercise program compliance    Goals:     Short Term Goals:  6 weeks  Progress    Pain: Patient will demonstrate improved pain by reports of less than or equal to 7/10 worst pain on the verbal rating scale in order to progress  toward maximal functional ability and improve quality of life. GM   Function: Patient will demonstrate improved function as indicated by a functional limitation score of less than or equal to 36 out of 100 on FOTO. GM   Mobility: Patient will improve active range of motion to 50% of stated goals, listed in objective measures above, in order to progress towards independence with functional activities.  GM   Strength: Patient will improve strength to 50% of stated goals, listed in objective measures above, in order to progress towards independence with functional activities.  GM   HEP: Patient will demonstrate independence with home exercise program in order to progress toward functional independence. GM      Long Term Goals:  12 weeks  Progress   Pain: Patient will demonstrate improved pain by reports of less than or equal to 6/10 worst pain on the verbal rating scale in order to progress toward maximal functional ability and improve quality of life.   GM   Function: Patient will demonstrate improved function as indicated by a functional limitation score of less than or equal to 36 out of 100 on FOTO. GM   Mobility: Patient will improve active range of motion to stated goals, listed in objective measures above, in order to return to maximal functional potential and improve quality of life. GM   Strength: Patient will improve strength to stated goals, listed in objective measures above, in order to improve functional independence and quality of life. PC   Patient will return to normal ADL's, IADL's, community involvement, recreational activities, and work-related activities with less than or equal to 5/10 pain and maximal function.  PC      Goals Key:  PC= Progressing/Continue;       PM= Partially Met;       GM= Goal Met,      DC= Discontinue    Plan     Continue Plan of Care (POC) and progress per patient tolerance.      Ivana Gallo, OT  ,OTD, OTR/L

## 2023-08-30 ENCOUNTER — OFFICE VISIT (OUTPATIENT)
Dept: CARDIOLOGY | Facility: CLINIC | Age: 83
End: 2023-08-30
Payer: MEDICARE

## 2023-08-30 VITALS
DIASTOLIC BLOOD PRESSURE: 82 MMHG | BODY MASS INDEX: 26.1 KG/M2 | HEIGHT: 61 IN | WEIGHT: 138.25 LBS | HEART RATE: 56 BPM | OXYGEN SATURATION: 94 % | SYSTOLIC BLOOD PRESSURE: 124 MMHG

## 2023-08-30 DIAGNOSIS — Q24.5 CORONARY-MYOCARDIAL BRIDGE: ICD-10-CM

## 2023-08-30 DIAGNOSIS — I51.81 STRESS-INDUCED CARDIOMYOPATHY: ICD-10-CM

## 2023-08-30 DIAGNOSIS — G45.9 TIA (TRANSIENT ISCHEMIC ATTACK): ICD-10-CM

## 2023-08-30 DIAGNOSIS — E11.9 TYPE 2 DIABETES MELLITUS WITHOUT COMPLICATION, WITHOUT LONG-TERM CURRENT USE OF INSULIN: ICD-10-CM

## 2023-08-30 DIAGNOSIS — I70.0 ATHEROSCLEROSIS OF AORTA: ICD-10-CM

## 2023-08-30 DIAGNOSIS — I10 ESSENTIAL HYPERTENSION: Primary | Chronic | ICD-10-CM

## 2023-08-30 PROBLEM — I20.89 OTHER FORMS OF ANGINA PECTORIS: Status: RESOLVED | Noted: 2020-01-24 | Resolved: 2023-08-30

## 2023-08-30 PROCEDURE — 99214 OFFICE O/P EST MOD 30 MIN: CPT | Mod: S$PBB,,, | Performed by: INTERNAL MEDICINE

## 2023-08-30 PROCEDURE — 99999 PR PBB SHADOW E&M-EST. PATIENT-LVL IV: ICD-10-PCS | Mod: PBBFAC,,, | Performed by: INTERNAL MEDICINE

## 2023-08-30 PROCEDURE — 99214 OFFICE O/P EST MOD 30 MIN: CPT | Mod: PBBFAC | Performed by: INTERNAL MEDICINE

## 2023-08-30 PROCEDURE — 99999 PR PBB SHADOW E&M-EST. PATIENT-LVL IV: CPT | Mod: PBBFAC,,, | Performed by: INTERNAL MEDICINE

## 2023-08-30 PROCEDURE — 99214 PR OFFICE/OUTPT VISIT, EST, LEVL IV, 30-39 MIN: ICD-10-PCS | Mod: S$PBB,,, | Performed by: INTERNAL MEDICINE

## 2023-08-30 NOTE — PROGRESS NOTES
Subjective:   Patient ID:  Leslie Wood is a 83 y.o. female who presents for evaluation of No chief complaint on file.  8.30.2023  Comes in for follow-up.    Two weeks cardiac monitor non relevant. no more episodes of TIA  Denies any chest pain, palpitations syncope presyncope   She had her colonoscopy done.    No lower extremity swelling.    Walks with a walker.  Doing occupational therapy.    History of chronic knee pain.    05/04/2023.    Comes in for evaluation for loop recorder.    She had seen neurology.    She had the 14 days monitor did not show any arrhythmias.    She does not have any palpitations.    She is wobbly on her walking.    She is bruising very easily with aspirin.    Her CT scan did not show multiple areas of ischemia to suspect embolic stroke.    She also has history with reveal stricture and 80 is not possible for her.    She states that when she wore the monitor she was and 3 where in the whole time and she is not sure about the quality of the monitor and she also states that she missed the new monitor that was sent to her house.      1.31.2023  She presented last month to the hospital with syncope.  Her beta-blocker dose was decreased.    And was switched from carvedilol to Toprol 25 mg daily.    In the last 2 days she has been out of her metoprolol.    She states that she is awaiting an appointment in March for evaluation for knee replacement.    She denies any more chest pain.    Her repeat echocardiogram in the hospital showed recovery of her LV EF from 30-40% to 60%.    She denies any dyspnea, chest pain, palpitations, syncope presyncope.        7.27.2022  Comes in for a follow up after recent visit to the ED  She states after she took the first dose of recently prescribed BELBUCA for back pain, she felt immediately dyspnea, resolved in the ED  She had no changes on ekg and two negative troponins with an elevated BNP of 200   She has been off the lasix, baseline BNP is normal   She  does reports FUENTES NYHA 1-2 lately, but no orthopnea and no leg swelling     6.6.2022  Comes in for follow-up.  She was admitted twice to the hospital last month with chest pain.  Had 2 heart catheterization.  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  Her EF is low.  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  She denies any more chest pain.  Her memory loss has been worse as per prior visit with her daughter in the hospital.  No bleeding  Also seen Dr. Carl for 2nd opinion    Interval hx: 2/11/2021   Complains of occipital headache  No chest pain, no dyspnea  States she is doing rehab and sometimes her BP is 200   Not taking lasix. On imdur  Esr came back normal after last visit       Initial HPI: 10/1/2020 Patient 80-year-old, prediabetic, with history of hypertension, who recently had a stroke about 3 months ago and was hospitalized at [facility].  She does not recall what kind of cardiac workup she underwent while at that hospital.  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  Her left-sided weakness has improved since the stroke.  She is undergoing rehab.  And should undergo speech therapy soon as she states.  She reports compliance with her blood pressure medications, however she reports feeling dizzy when she stands up, she does not get dizzy or any other circumstances.  She states that she is not drinking enough water.  Also she complains of bilateral lower extremity mild swelling that comes on and off when she takes or not take her amlodipine.  She denies any chest pain, dyspnea, orthopnea, PND, palpitations, syncope, presyncope, bleeding.                Past Medical History:   Diagnosis Date    Arthritis     Cataract     GERD (gastroesophageal reflux disease)     Heart attack 05/18/2022    Heart attack 05/23/2022    Hyperlipidemia     Hypertension     Stroke        Past Surgical History:   Procedure Laterality  Date    ADENOIDECTOMY      ADRENAL GLAND SURGERY      APPENDECTOMY      BACK SURGERY      fusion l 4-5 s 1,2,3  fusion l 2-3    CORONARY ANGIOGRAPHY N/A 05/20/2022    Procedure: ANGIOGRAM, CORONARY ARTERY;  Surgeon: Domingo Martins MD;  Location: [facility] CATH LAB;  Service: Cardiology;  Laterality: N/A;    CORONARY ANGIOGRAPHY N/A 05/24/2022    Procedure: ANGIOGRAM, CORONARY ARTERY;  Surgeon: Domingo Martins MD;  Location: [facility] CATH LAB;  Service: Cardiology;  Laterality: N/A;    EYE SURGERY      HEMORRHOID SURGERY      HERNIA REPAIR      HYSTERECTOMY      partial    indirect lumbar decompression      percutaneous placement of interspinous extension blocker    LEFT HEART CATHETERIZATION Left 05/20/2022    Procedure: CATHETERIZATION, HEART, LEFT;  Surgeon: Domingo Martins MD;  Location: [facility] CATH LAB;  Service: Cardiology;  Laterality: Left;    TONSILLECTOMY         Social History     Tobacco Use    Smoking status: Never    Smokeless tobacco: Never   Substance Use Topics    Alcohol use: No    Drug use: No       Family History   Problem Relation Age of Onset    Heart disease Mother     Hypertension Mother     Diabetes Mother     Hypertension Father     Kidney disease Father     Breast cancer Sister        Review of Systems   Constitutional: Negative for fever and malaise/fatigue.   HENT: Negative for sore throat.    Eyes: Negative for blurred vision.   Cardiovascular: Negative for chest pain, claudication, cyanosis, dyspnea on exertion, irregular heartbeat, leg swelling, near-syncope, orthopnea, palpitations, paroxysmal nocturnal dyspnea and syncope.         Current Outpatient Medications on File Prior to Visit   Medication Sig    aspirin (ECOTRIN) 81 MG EC tablet Take 1 tablet (81 mg total) by mouth once daily.    atorvastatin (LIPITOR) 80 MG tablet Take 0.5 tablets (40 mg total) by mouth every morning.    DULoxetine (CYMBALTA) 30 MG capsule Take 30 mg by mouth once daily.    furosemide (LASIX) 20 MG tablet TAKE 1  TABLET BY MOUTH ONCE A DAY    losartan (COZAAR) 50 MG tablet Take 1 tablet (50 mg total) by mouth 2 (two) times a day.    metoprolol succinate (TOPROL-XL) 25 MG 24 hr tablet Take 1 tablet (25 mg total) by mouth once daily.    NIFEdipine (ADALAT CC) 30 MG TbSR Take 1 tablet (30 mg total) by mouth once daily.    NUCYNTA 50 mg Tab Take 1 tablet (50 mg total) by mouth every 8 (eight) hours as needed (severe pain). RESTART WHEN OK PER PAIN MANAGEMENT PROVIDER    tiZANidine (ZANAFLEX) 4 MG tablet     [DISCONTINUED] clopidogreL (PLAVIX) 75 mg tablet Take 1 tablet (75 mg total) by mouth once daily. for 21 days    [DISCONTINUED] carvediloL (COREG) 6.25 MG tablet Take 1 tablet (6.25 mg total) by mouth 2 (two) times daily. TAKE (1) TABLET BY MOUTH 2 TIMES A DAY WITH MEALS    [DISCONTINUED] cloNIDine (CATAPRES) 0.1 MG tablet Take 1 tablet (0.1 mg total) by mouth 2 (two) times daily. To take an extra dose if BP >160/90    [DISCONTINUED] diclofenac sodium (VOLTAREN) 1 % Gel Apply 2 g topically 3 (three) times daily.    [DISCONTINUED] isosorbide mononitrate (IMDUR) 30 MG 24 hr tablet TAKE 1 TABLET BY MOUTH TWICE A DAY    [DISCONTINUED] metoprolol tartrate (LOPRESSOR) 25 MG tablet Take 1 tablet (25 mg total) by mouth 3 (three) times daily.    [DISCONTINUED] nitroGLYCERIN (NITROSTAT) 0.4 MG SL tablet Place 1 tablet (0.4 mg total) under the tongue every 5 (five) minutes as needed for Chest pain.    [DISCONTINUED] valsartan (DIOVAN) 160 MG tablet TAKE 1 TABLET BY MOUTH TWICE A DAY     No current facility-administered medications on file prior to visit.       Objective:   Objective:  Wt Readings from Last 3 Encounters:   08/30/23 62.7 kg (138 lb 3.7 oz)   07/12/23 60.3 kg (132 lb 15 oz)   07/06/23 60.3 kg (133 lb)     Temp Readings from Last 3 Encounters:   07/03/23 98.3 °F (36.8 °C) (Tympanic)   03/08/23 97.8 °F (36.6 °C) (Oral)   03/02/23 97.6 °F (36.4 °C) (Tympanic)     BP Readings from Last 3 Encounters:   08/30/23 124/82    07/12/23 135/83   07/03/23 112/62     Pulse Readings from Last 3 Encounters:   08/30/23 (!) 56   07/12/23 67   07/03/23 96       Physical Exam  Vitals reviewed.   Constitutional:       Appearance: She is well-developed.   HENT:      Head: Normocephalic and atraumatic.   Eyes:      General: No scleral icterus.     Conjunctiva/sclera: Conjunctivae normal.   Cardiovascular:      Rate and Rhythm: Normal rate and regular rhythm.      Pulses: Intact distal pulses.      Heart sounds: Normal heart sounds. No murmur heard.           Lab Results   Component Value Date    CHOL 96 (L) 02/01/2023    CHOL 155 05/20/2022    CHOL 166 11/10/2021     Lab Results   Component Value Date    HDL 47 02/01/2023    HDL 45 05/20/2022    HDL 41 11/10/2021     Lab Results   Component Value Date    LDLCALC 38.8 (L) 02/01/2023    LDLCALC 98.0 05/20/2022    LDLCALC 102.0 11/10/2021     Lab Results   Component Value Date    TRIG 51 02/01/2023    TRIG 60 05/20/2022    TRIG 115 11/10/2021     Lab Results   Component Value Date    CHOLHDL 49.0 02/01/2023    CHOLHDL 29.0 05/20/2022    CHOLHDL 24.7 11/10/2021       Chemistry        Component Value Date/Time     03/08/2023 1051    K 4.0 03/08/2023 1051     03/08/2023 1051    CO2 24 03/08/2023 1051    BUN 28 (H) 03/08/2023 1051    CREATININE 1.1 03/08/2023 1051     03/08/2023 1051        Component Value Date/Time    CALCIUM 9.9 03/08/2023 1051    ALKPHOS 93 03/08/2023 1051    AST 30 03/08/2023 1051    ALT 32 03/08/2023 1051    BILITOT 1.0 03/08/2023 1051    ESTGFRAFRICA >60 07/23/2022 2111    EGFRNONAA >60 07/23/2022 2111          Lab Results   Component Value Date    TSH 1.171 02/01/2023     Lab Results   Component Value Date    INR 1.0 02/02/2023    INR 1.1 05/24/2022    INR 1.0 05/24/2022     Lab Results   Component Value Date    WBC 10.48 03/08/2023    HGB 14.4 03/08/2023    HCT 45.3 03/08/2023    MCV 90 03/08/2023     03/08/2023      BNP  @LABRCNTIP(BNP,BNPTRIAGEBLO)@  CrCl cannot be calculated (Patient's most recent lab result is older than the maximum 7 days allowed.).     Imaging:  ======  Results for orders placed during the hospital encounter of 03/02/20   Echo Color Flow Doppler? Yes    Narrative · Mild concentric left ventricular hypertrophy.  · Normal left ventricular systolic function. The estimated ejection   fraction is 60%.  · Normal LV diastolic function.  · Normal right ventricular systolic function.  · Mild aortic regurgitation.  · Normal central venous pressure (3 mmHg).  · Trivial pericardial effusion.        No results found for this or any previous visit.  No results found for this or any previous visit.  Results for orders placed during the hospital encounter of 05/11/17   X-Ray Chest PA And Lateral    Narrative XR CHEST PA AND LATERAL, 05/11/17 11:38:46    Clinical indication: Chest pain.    Findings:  Comparison with 05/10/2017.    Epidural leads within the dorsal thoracic spine.  Lower thoracic wedge compression fracture status post kyphoplasty.  Left upper quadrant surgical clips.    Heart size is normal.  Prominent left pericardial fat pad.    Aortic arch calcification.    Lung fields remain clear.    Impression      Stable chest x-ray.  No acute findings.      Electronically signed by: EMERY SAMAYOA MD  Date:     05/11/17  Time:    12:07      No results found for this or any previous visit.  No procedure found.    Diagnostic Results:  ECG: Reviewed  Marked sinus bradycardia with sinus arrhythmia   Abnormal ECG   When compared with ECG of 28-OCT-2020 16:09,   Criteria for Septal infarct are no longer Present   T wave inversion no longer evident in Anterior leads   Confirmed by MD RODERICK, BARB ([id number]) on 11/12/2020 9:25:42 PM       Results for orders placed during the hospital encounter of 05/24/22    Cardiac catheterization    Conclusion  · The estimated blood loss was none.  · There is severe mid LAD intramyocardial  bridging improved with betablockers intra op  · There was no evidence of an acute atherothrombotic lesion    The procedure log was documented by Documenter: Te Waller and verified by Domingo Martins MD.    Date: 5/24/2022  Time: 9:49 AM    The ASCVD Risk score (Karissa BROWN, et al., 2019) failed to calculate for the following reasons:    The 2019 ASCVD risk score is only valid for ages 40 to 79    Assessment and Plan:   Essential hypertension    Atherosclerosis of aorta    Coronary-myocardial bridge    TIA (transient ischemic attack)    Stress-induced cardiomyopathy    Type 2 diabetes mellitus without complication, without long-term current use of insulin      Reviewed all tests and above medical conditions with patient in detail and formulated treatment plan.  Risk factor modification discussed.   Cardiac low salt diet discussed.  Maintaining healthy weight and weight loss goals were discussed in clinic.  Hypertension controlled, continue with beta-blocker, chest pain-free  cw toprol   Had no obstruction on her coronary catheterization last year 2022.    Chest pain-free.  Occipital functional status.    Two weeks cardiac monitor non relevant    Follow up in 6 months

## 2023-08-31 ENCOUNTER — EXTERNAL CHRONIC CARE MANAGEMENT (OUTPATIENT)
Dept: PRIMARY CARE CLINIC | Facility: CLINIC | Age: 83
End: 2023-08-31
Payer: MEDICARE

## 2023-08-31 PROCEDURE — 99490 CHRNC CARE MGMT STAFF 1ST 20: CPT | Mod: PBBFAC | Performed by: FAMILY MEDICINE

## 2023-08-31 PROCEDURE — 99439 CHRNC CARE MGMT STAF EA ADDL: CPT | Mod: PBBFAC | Performed by: FAMILY MEDICINE

## 2023-08-31 PROCEDURE — 99499 NO LOS: ICD-10-PCS | Mod: S$PBB,,, | Performed by: FAMILY MEDICINE

## 2023-08-31 PROCEDURE — 99499 UNLISTED E&M SERVICE: CPT | Mod: S$PBB,,, | Performed by: FAMILY MEDICINE

## 2023-09-01 ENCOUNTER — CLINICAL SUPPORT (OUTPATIENT)
Dept: REHABILITATION | Facility: HOSPITAL | Age: 83
End: 2023-09-01
Payer: MEDICARE

## 2023-09-01 DIAGNOSIS — Z78.9 DECREASED ACTIVITIES OF DAILY LIVING (ADL): ICD-10-CM

## 2023-09-01 DIAGNOSIS — M62.81 DECREASED MUSCLE STRENGTH: ICD-10-CM

## 2023-09-01 DIAGNOSIS — R29.818 FINE MOTOR IMPAIRMENT: ICD-10-CM

## 2023-09-01 DIAGNOSIS — R29.898 DECREASED GRIP STRENGTH OF LEFT HAND: ICD-10-CM

## 2023-09-01 DIAGNOSIS — R29.898 FINE MOTOR IMPAIRMENT: ICD-10-CM

## 2023-09-01 DIAGNOSIS — M25.60 RANGE OF MOTION DEFICIT: Primary | ICD-10-CM

## 2023-09-01 PROCEDURE — 97110 THERAPEUTIC EXERCISES: CPT

## 2023-09-01 PROCEDURE — 97112 NEUROMUSCULAR REEDUCATION: CPT

## 2023-09-01 PROCEDURE — 97530 THERAPEUTIC ACTIVITIES: CPT

## 2023-09-01 NOTE — PROGRESS NOTES
[facility]  Occupational Therapy Treatment Note     Date: 9/1/2023  Name: Leslie CASTELLON [facility] Number: [id number]    Therapy Diagnosis:   Encounter Diagnoses   Name Primary?    Range of motion deficit Yes    Decreased  strength of left hand     Decreased muscle strength     Decreased activities of daily living (ADL)     Fine motor impairment        Physician: Angeles Carson MD    Physician Orders: Occupational Therapy to Evaluate and Treat   Medical Diagnosis: R29.898 (ICD-10-CM) - Poor fine motor skills  Surgical Procedure and Date: N/A     Evaluation Date: 5/17/2023  Insurance Authorization Period Expiration: 6/5/2023 - 12/31/2023  Plan of Care Certification Period: 5/17/2023 - 10/17/2023  Progress Note Due: 9/10/2023      Date of Return to MD: N/A     Visit # / Visits authorized: 9/25 (10 Episode Visits)   FOTO: 1/3     Precautions:  Standard    Time In: 10:45  Time Out: 11:30  Total Treatment Time: 45 minutes  Total Billable Time: 45 minutes    Subjective     Patient reports: She is doing well. She is able to open containers now    she was compliant with home exercise program given last session.   Response to previous treatment: Tolerated well  Functional change: Improved knowledge of HEP    Pain: 0/10  Location: bilateral hands     Objective     Please see progress note dated 8/11/2023 for updated objective measures    Treatment     Supervised Modalities: Modalities such as hot/cold packs, traction, unattended electrical stimulation, paraffin bath, fluidotherapy to reduce pain and increase soft tissue extensibility. Leslie received (0) minutes of modalities listed below after being cleared for contraindications.  x = modalities performed     Supervised Modalities 9/1/2023                            Manual Therapy Techniques: Joint mobilizations, Soft tissue Mobilization, and mobilization with movement applied to the area listed below. Leslie received (0) minutes of interventions. x =  intervention performed today    Manual Intervention 9/1/2023    Soft Tissue Mobilization     Joint Mobilizations     Mobilization with movement              Therapeutic Exercises: to develop strength, endurance, ROM, flexibility, posture and core stabilization. Leslie received (15) minutes of exercises listed below. x = performed today * = level of progression of activity     Therapeutic Exercise 9/1/2023    Interossei  -green foam  -rubber bands x 20x bilateral    Digi flexion - red x 20x bilateral    Resistive pinch - red  -2pt  -3pt  -lateral x 20x bilateral    Thumb palmar   -adduction  -abduction x 20x bilateral    Thumb radial  -adduction  -abduction x 20x bilateral    Forearm exercise  - wrist flexion  -wrist extension  - supination  -pronation x 2lb        Therapeutic Activities: Everyday tasks to address the combined need of improved strength, range of motion, and endurance to achieve increased independence. While simulating these activities, the person is applying the gained skills of strength and improved range of motion in a practical way.  Leslie received (15) minutes of activities listed below. x = activities performed today * = level of progression of activity     Therapeutic Activities 9/1/2023    Velcro board  -lateral pinch  - dowel  x 20x bilateral    Opening and closing containers x 5 minutes, ergonomics               Neuromuscular Re-education: the use of functional strengthening, stretching, balancing and coordination activities that train the nerves and muscles to react and communicate to restore normal body movement patterns to increase self care independence. Leslie received (15) minutes of interventions listed below.  x = interventions performed today * = level of progression of activity     Neuromuscular Re-education 9/1/2023    Finger opposition  x 20x   Finger taps (extension) x 20x   Finger opposition with slides x    Table Top active assist range of motion  - shoulder flexion  - shoulder  abduction  x 5x - 15 second holds     Self Care: Fundamental skills required to independently care for oneself. Activities of daily living such as eating, bathing, dressing, toileting, grooming, sleeping, transfers and mobility. Instrumental activities of daily living such as driving, medication management, pet care, home management/cleaning, financial management, using the phone, meal preparation and shopping. Leslie received (0) minutes of interventions listed below.  x = interventions performed * = level of progression of activity     Self Care 9/1/2023                            Home Exercises and Education Provided     Education provided:   - home exercise program education provided  - progress towards goals     Written Home Exercises Provided: Patient instructed to cont prior HEP.  Exercises were reviewed and Leslie was able to demonstrate them prior to the end of the session. Leslie demonstrated good understanding of the home exercise program provided.     See EMR under Patient Instructions for exercises provided  5/17/2023 .        Assessment     Patient tolerated exercises well. Needs verbal cues for hand placement.     Leslie is progressing towards her goals and there are no updates to goals at this time. Patient prognosis is Good.     Patient will continue to benefit from skilled outpatient occupational therapy to address the deficits listed in the problem list on initial evaluation provide patient/family education and to maximize patient's level of independence in the home and community environment.     Patient's spiritual, cultural and educational needs considered and patient agreeable to plan of care and goals.    Anticipated barriers to occupational therapy: Cognition, home exercise program compliance    Goals:     Short Term Goals:  6 weeks  Progress    Pain: Patient will demonstrate improved pain by reports of less than or equal to 7/10 worst pain on the verbal rating scale in order to progress toward  maximal functional ability and improve quality of life. GM   Function: Patient will demonstrate improved function as indicated by a functional limitation score of less than or equal to 36 out of 100 on FOTO. GM   Mobility: Patient will improve active range of motion to 50% of stated goals, listed in objective measures above, in order to progress towards independence with functional activities.  GM   Strength: Patient will improve strength to 50% of stated goals, listed in objective measures above, in order to progress towards independence with functional activities.  GM   HEP: Patient will demonstrate independence with home exercise program in order to progress toward functional independence. GM      Long Term Goals:  12 weeks  Progress   Pain: Patient will demonstrate improved pain by reports of less than or equal to 6/10 worst pain on the verbal rating scale in order to progress toward maximal functional ability and improve quality of life.   GM   Function: Patient will demonstrate improved function as indicated by a functional limitation score of less than or equal to 36 out of 100 on FOTO. GM   Mobility: Patient will improve active range of motion to stated goals, listed in objective measures above, in order to return to maximal functional potential and improve quality of life. GM   Strength: Patient will improve strength to stated goals, listed in objective measures above, in order to improve functional independence and quality of life. PC   Patient will return to normal ADL's, IADL's, community involvement, recreational activities, and work-related activities with less than or equal to 5/10 pain and maximal function.  PC      Goals Key:  PC= Progressing/Continue;       PM= Partially Met;       GM= Goal Met,      DC= Discontinue    Plan     Continue Plan of Care (POC) and progress per patient tolerance.      Iavna Gallo, OT  ,OTD, OTR/L

## 2023-09-04 ENCOUNTER — HOSPITAL ENCOUNTER (EMERGENCY)
Facility: HOSPITAL | Age: 83
Discharge: HOME | End: 2023-09-04
Attending: EMERGENCY MEDICINE
Payer: MEDICARE

## 2023-09-04 VITALS
TEMPERATURE: 98 F | RESPIRATION RATE: 18 BRPM | HEART RATE: 62 BPM | OXYGEN SATURATION: 98 % | BODY MASS INDEX: 26.06 KG/M2 | WEIGHT: 138 LBS | HEIGHT: 61 IN

## 2023-09-04 DIAGNOSIS — H66.91 RIGHT OTITIS MEDIA, UNSPECIFIED OTITIS MEDIA TYPE: Primary | ICD-10-CM

## 2023-09-04 PROCEDURE — 99283 EMERGENCY DEPT VISIT LOW MDM: CPT

## 2023-09-04 PROCEDURE — 25000003 PHARM REV CODE 250: Performed by: NURSE PRACTITIONER

## 2023-09-04 RX ORDER — ACETAMINOPHEN 500 MG
1000 TABLET ORAL
Status: COMPLETED | OUTPATIENT
Start: 2023-09-04 | End: 2023-09-04

## 2023-09-04 RX ORDER — AMOXICILLIN 875 MG/1
875 TABLET, FILM COATED ORAL 2 TIMES DAILY
Qty: 14 TABLET | Refills: 0 | Status: SHIPPED | OUTPATIENT
Start: 2023-09-04 | End: 2023-09-11

## 2023-09-04 RX ADMIN — ACETAMINOPHEN 1000 MG: 500 TABLET ORAL at 02:09

## 2023-09-04 NOTE — ED PROVIDER NOTES
Encounter Date: 9/4/2023       History     Chief Complaint   Patient presents with    Otalgia     Right, started 2 nights ago      Patient is an 83-year-old female who presents with right ear pain x2 days.  Denies any ear drainage.  Denies any trauma to the ear.  Patient reports applying drops 2 nights ago which improved symptoms but states that they are not helping tonight.  Patient shows no signs of distress at this time.      Review of patient's allergies indicates:   Allergen Reactions    Amitriptyline     Hydrochlorothiazide      Causes muscle cramping    Lisinopril      hyperkalemia    Oxycodone     Percocet [oxycodone-acetaminophen] Other (See Comments)     Seizures    Belbuca [buprenorphine hcl] Nausea And Vomiting and Other (See Comments)     Black out     Codeine Nausea Only and Rash    Prazosin Other (See Comments)     dizziness     Past Medical History:   Diagnosis Date    Arthritis     Cataract     GERD (gastroesophageal reflux disease)     Heart attack 05/18/2022    Heart attack 05/23/2022    Hyperlipidemia     Hypertension     Stroke      Past Surgical History:   Procedure Laterality Date    ADENOIDECTOMY      ADRENAL GLAND SURGERY      APPENDECTOMY      BACK SURGERY      fusion l 4-5 s 1,2,3  fusion l 2-3    CORONARY ANGIOGRAPHY N/A 05/20/2022    Procedure: ANGIOGRAM, CORONARY ARTERY;  Surgeon: Domingo Martins MD;  Location: [facility];  Service: Cardiology;  Laterality: N/A;    CORONARY ANGIOGRAPHY N/A 05/24/2022    Procedure: ANGIOGRAM, CORONARY ARTERY;  Surgeon: Domingo Martins MD;  Location: [facility];  Service: Cardiology;  Laterality: N/A;    EYE SURGERY      HEMORRHOID SURGERY      HERNIA REPAIR      HYSTERECTOMY      partial    indirect lumbar decompression      percutaneous placement of interspinous extension blocker    LEFT HEART CATHETERIZATION Left 05/20/2022    Procedure: CATHETERIZATION, HEART, LEFT;  Surgeon: Domingo Martins MD;  Location: [facility];  Service:  Cardiology;  Laterality: Left;    TONSILLECTOMY       Family History   Problem Relation Age of Onset    Heart disease Mother     Hypertension Mother     Diabetes Mother     Hypertension Father     Kidney disease Father     Breast cancer Sister      Social History     Tobacco Use    Smoking status: Never    Smokeless tobacco: Never   Substance Use Topics    Alcohol use: No    Drug use: No     Review of Systems   Constitutional:  Negative for fever.   HENT:  Positive for ear pain (right). Negative for sore throat.    Respiratory:  Negative for shortness of breath.    Cardiovascular:  Negative for chest pain.   Gastrointestinal:  Negative for nausea.   Genitourinary:  Negative for dysuria.   Musculoskeletal:  Negative for back pain.   Skin:  Negative for rash.   Neurological:  Negative for weakness.   Hematological:  Does not bruise/bleed easily.       Physical Exam     Initial Vitals [09/04/23 0133]   BP Pulse Resp Temp SpO2   -- 62 18 97.9 °F (36.6 °C) 98 %      MAP       --         Physical Exam    Nursing note and vitals reviewed.  Constitutional: She appears well-developed and well-nourished.   HENT:   Head: Normocephalic and atraumatic.   Right Ear: No drainage, swelling or tenderness. No mastoid tenderness. Tympanic membrane is injected and erythematous. No middle ear effusion.   Eyes: EOM are normal. Pupils are equal, round, and reactive to light.   Neck: Neck supple.   Normal range of motion.  Cardiovascular:  Normal rate, regular rhythm, normal heart sounds and intact distal pulses.           Pulmonary/Chest: Breath sounds normal.   Abdominal: Abdomen is soft. Bowel sounds are normal.   Musculoskeletal:         General: Normal range of motion.      Cervical back: Normal range of motion and neck supple.     Neurological: She is alert and oriented to person, place, and time. She has normal strength and normal reflexes.   Skin: Skin is warm and dry.         ED Course   Procedures  Labs Reviewed - No data to  display       Imaging Results    None          Medications   acetaminophen tablet 1,000 mg (has no administration in time range)     Medical Decision Making  I discussed with patient and/or family/caretaker that evaluation in the ED does not suggest any emergent or life threatening medical conditions requiring immediate intervention beyond what was provided in the ED, and I believe patient is safe for discharge. Regardless, an unremarkable evaluation in the ED does not preclude the development or presence of a serious of life threatening condition. As such, patient was instructed to return immediately for any worsening or change in current symptoms.      Risk  OTC drugs.  Prescription drug management.                               Clinical Impression:   Final diagnoses:  [H66.91] Right otitis media, unspecified otitis media type (Primary)        ED Disposition Condition    Discharge Stable          ED Prescriptions       Medication Sig Dispense Start Date End Date Auth. Provider    amoxicillin (AMOXIL) 875 MG tablet Take 1 tablet (875 mg total) by mouth 2 (two) times daily. for 7 days 14 tablet 9/4/2023 9/11/2023 Cody Avendano, KARINA          Follow-up Information       Follow up With Specialties Details Why Contact Info    Angeles Casron MD Internal Medicine  As needed [address] [place] [address] DR Iggy [place] [id number]  [phone]               Cody Avendano NP  09/04/23 0141       Cody Avendano NP  09/04/23 0142

## 2023-09-06 ENCOUNTER — TELEPHONE (OUTPATIENT)
Dept: INTERNAL MEDICINE | Facility: CLINIC | Age: 83
End: 2023-09-06
Payer: MEDICARE

## 2023-09-06 DIAGNOSIS — I10 HYPERTENSION, UNSPECIFIED TYPE: ICD-10-CM

## 2023-09-06 NOTE — TELEPHONE ENCOUNTER
Refill Routing Note   Medication(s) are not appropriate for processing by [facility] for the following reason(s):      Patient seen in ED/Hospital since LOV with provider    [facility] action(s):  Defer Care Due:  None identified          Pharmacist review requested: Yes     Appointments  past 12m or future 3m with PCP    Date Provider   Last Visit   3/2/2023 Angeles Carson MD   Next Visit   Visit date not found Angeles Carson MD   ED visits in past 90 days: 1        Note composed:4:15 PM 09/06/2023

## 2023-09-06 NOTE — TELEPHONE ENCOUNTER
Refill Routing Note   Medication(s) are not appropriate for processing by [facility] for the following reason(s):      Non-participating provider    [facility] action(s):  Route Care Due:  None identified          Pharmacist review requested: Yes     Appointments  past 12m or future 3m with PCP    Date Provider   Last Visit   3/2/2023 Angeles Carson MD   Next Visit   Visit date not found Angeles Carson MD   ED visits in past 90 days: 1        Note composed:4:50 PM 09/06/2023

## 2023-09-06 NOTE — TELEPHONE ENCOUNTER
----- Message from Cassie Garces sent at 9/6/2023 10:11 AM CDT -----  Contact: Ariane/Granddaughter  .Type:  RX Refill Request    Who Called: Ariane  Refill or New Rx:Refill  RX Name and Strength:losartan (COZAAR) 50 MG tablet  How is the patient currently taking it? (ex. 1XDay):AS prescribed  Is this a 30 day or 90 day RX:30  Preferred Pharmacy with phone number:    [place], [place] - [address] [address]  [address] [address] [address]  Phone: [phone] Fax: [phone]      Local or Mail Order:Local  Ordering Provider:  Would the patient rather a call back or a response via [facility]? Call   Best Call Back Number:[phone]     Additional Information:

## 2023-09-07 ENCOUNTER — CLINICAL SUPPORT (OUTPATIENT)
Dept: REHABILITATION | Facility: HOSPITAL | Age: 83
End: 2023-09-07
Payer: MEDICARE

## 2023-09-07 ENCOUNTER — TELEPHONE (OUTPATIENT)
Dept: CARDIOLOGY | Facility: CLINIC | Age: 83
End: 2023-09-07
Payer: MEDICARE

## 2023-09-07 DIAGNOSIS — M25.60 RANGE OF MOTION DEFICIT: Primary | ICD-10-CM

## 2023-09-07 DIAGNOSIS — R29.898 FINE MOTOR IMPAIRMENT: ICD-10-CM

## 2023-09-07 DIAGNOSIS — I10 HYPERTENSION, UNSPECIFIED TYPE: ICD-10-CM

## 2023-09-07 DIAGNOSIS — Z78.9 DECREASED ACTIVITIES OF DAILY LIVING (ADL): ICD-10-CM

## 2023-09-07 DIAGNOSIS — M62.81 DECREASED MUSCLE STRENGTH: ICD-10-CM

## 2023-09-07 DIAGNOSIS — R29.898 DECREASED GRIP STRENGTH OF LEFT HAND: ICD-10-CM

## 2023-09-07 DIAGNOSIS — R29.818 FINE MOTOR IMPAIRMENT: ICD-10-CM

## 2023-09-07 PROCEDURE — 97110 THERAPEUTIC EXERCISES: CPT

## 2023-09-07 PROCEDURE — 97530 THERAPEUTIC ACTIVITIES: CPT

## 2023-09-07 PROCEDURE — 97112 NEUROMUSCULAR REEDUCATION: CPT

## 2023-09-07 RX ORDER — LOSARTAN POTASSIUM 50 MG/1
50 TABLET ORAL 2 TIMES DAILY
Qty: 180 TABLET | Refills: 1 | OUTPATIENT
Start: 2023-09-07 | End: 2024-09-06

## 2023-09-07 RX ORDER — LOSARTAN POTASSIUM 50 MG/1
50 TABLET ORAL 2 TIMES DAILY
Qty: 180 TABLET | Refills: 1 | Status: SHIPPED | OUTPATIENT
Start: 2023-09-07 | End: 2024-03-19

## 2023-09-07 NOTE — TELEPHONE ENCOUNTER
----- Message from Angle Barragan sent at 9/7/2023  4:14 PM CDT -----  Contact: Leslie  .Type:  RX Refill Request    Who Called: Leslie   Refill or New Rx:refill   RX Name and Strength:losartan (COZAAR) 50 MG tablet  How is the patient currently taking it? (ex. 1XDay):as prescribed   Is this a 30 day or 90 day RX:90 days   Preferred Pharmacy with phone number:.  [facility] - [place], [place] - [address] [address]  [address] [address] [address]  Phone: [phone] Fax: [phone]  Local or Mail Order:local   Ordering Provider:Dr. Carson   Would the patient rather a call back or a response via [facility]? Call   Best Call Back Number:[phone]  Additional Information: Dr. Carson no longer with [facility]. Patient needing refill on medication until appt with new pcp.

## 2023-09-07 NOTE — PROGRESS NOTES
[facility]  Occupational Therapy Treatment Note and Discharge Summary     Date: 9/7/2023  Name: Leslie CASTELLON [facility] Number: [id number]    Therapy Diagnosis:   Encounter Diagnoses   Name Primary?    Range of motion deficit Yes    Decreased  strength of left hand     Decreased muscle strength     Decreased activities of daily living (ADL)     Fine motor impairment        Physician: Angeles Carson MD    Physician Orders: Occupational Therapy to Evaluate and Treat   Medical Diagnosis: R29.898 (ICD-10-CM) - Poor fine motor skills  Surgical Procedure and Date: N/A     Evaluation Date: 5/17/2023  Insurance Authorization Period Expiration: 6/5/2023 - 12/31/2023  Plan of Care Certification Period: 5/17/2023 - 10/17/2023  Progress Note Due: 9/10/2023      Date of Return to MD: N/A     Visit # / Visits authorized: 10/25 (11 Episode Visits)   FOTO: 1/3     Precautions:  Standard    Time In: 2:30  Time Out: 3:15  Total Treatment Time: 45 minutes  Total Billable Time: 45 minutes    Subjective     Patient reports: She is doing well. She is able to open containers now    she was compliant with home exercise program given last session.   Response to previous treatment: Tolerated well  Functional change: Improved knowledge of HEP    Pain: 0/10  Location: bilateral hands     Objective        Joint Evaluation  AROM  5/17/2023 AROM  5/17/2023 PROM   5/17/2023 AROM  9/7/2023 Goal     Left Right Right Right  Active    Shoulder Flexion 0-180 Within normal limits  90 110 115  110   Shoulder Abduction 0-180  90 110 115  110   Shoulder External Rotation 0-90  25 40 45      Shoulder Internal Rotation 0-90  45 60 65      Shoulder Extension 0-60  10 25 35      Shoulder Horizontal Adduction 0-90  Within normal limits  Within normal limits        Elbow Flexion 0-150          Elbow Extension 0          Wrist Flexion 0-80          Wrist Extension 0-70          Wrist Supination 0-80          Wrist Pronation 0-80           Wrist Ulnar Deviation 0-30          Wrist Radial Deviation 0-20             Fist: normal        Strength 5/17/2023 5/17/2023 9/7/2023     **within available ROM** Left Right Left Goal Left   Shoulder Flexion 3+/5 3-/5 4/5 4/5   Shoulder Abduction 3+/5 3-/5  4/5   Shoulder External Rotation  3+/5 3-/5  4/5   Shoulder Internal Rotation 3+/5 3-/5  4/5   Shoulder Extension 3+/5 3-/5  4/5   Shoulder Horizontal Adduction 3+/5 3-/5  4/5   Elbow Flexion 3+/5 3+/5  4/5   Elbow Extension 3+/5 3+/5  4/5   Wrist Flexion 3+/5 3+/5  4/5   Wrist Extension 3+/5 3+/5  4/5   Supination 3+/5 3+/5  4/5   Pronation 3+/5 3+/5  4/5   Ulnar Deviation 3+/5 3+/5  4/5   Radial Deviation 3+/5 3+/5  4/5       Strength: (MUNDO Dynamometer in lbs.) Average 3 trials, Position II:       5/17/2023 5/17/2023 9/7/2023 Goal     Left Right Left  Left   Rung II 25# 35# 35# 31#      Pinch Strength (Measured in psi)       5/17/2023 5/17/2023 9/7/2023     Left Right Left   2pt Pinch 6 psi 7 psi 8 psi   3pt Pinch 6 psi 7 psi 8 psi   Lateral Pinch 9 psi 12 psi 13 psi        Treatment     Supervised Modalities: Modalities such as hot/cold packs, traction, unattended electrical stimulation, paraffin bath, fluidotherapy to reduce pain and increase soft tissue extensibility. Leslie received (0) minutes of modalities listed below after being cleared for contraindications.  x = modalities performed     Supervised Modalities 9/7/2023                            Manual Therapy Techniques: Joint mobilizations, Soft tissue Mobilization, and mobilization with movement applied to the area listed below. Leslie received (0) minutes of interventions. x = intervention performed today    Manual Intervention 9/7/2023    Soft Tissue Mobilization     Joint Mobilizations     Mobilization with movement              Therapeutic Exercises: to develop strength, endurance, ROM, flexibility, posture and core stabilization. Leslie received (15) minutes of exercises listed below. x =  performed today * = level of progression of activity     Therapeutic Exercise 9/7/2023    Interossei  -green foam  -rubber bands x 20x bilateral    Digi flexion - red x 20x bilateral    Resistive pinch - red  -2pt  -3pt  -lateral x 20x bilateral    Thumb palmar   -adduction  -abduction x 20x bilateral    Thumb radial  -adduction  -abduction x 20x bilateral    Forearm exercise  - wrist flexion  -wrist extension  - supination  -pronation x 2lb        Therapeutic Activities: Everyday tasks to address the combined need of improved strength, range of motion, and endurance to achieve increased independence. While simulating these activities, the person is applying the gained skills of strength and improved range of motion in a practical way.  Leslie received (15) minutes of activities listed below. x = activities performed today * = level of progression of activity     Therapeutic Activities 9/7/2023    Velcro board  -lateral pinch  - dowel  x 20x bilateral    Opening and closing containers x 5 minutes, ergonomics               Neuromuscular Re-education: the use of functional strengthening, stretching, balancing and coordination activities that train the nerves and muscles to react and communicate to restore normal body movement patterns to increase self care independence. Leslie received (15) minutes of interventions listed below.  x = interventions performed today * = level of progression of activity     Neuromuscular Re-education 9/7/2023    Finger opposition  x 20x   Finger taps (extension) x 20x   Finger opposition with slides x    Table Top active assist range of motion  - shoulder flexion  - shoulder abduction  x 5x - 15 second holds     Self Care: Fundamental skills required to independently care for oneself. Activities of daily living such as eating, bathing, dressing, toileting, grooming, sleeping, transfers and mobility. Instrumental activities of daily living such as driving, medication management, pet care,  home management/cleaning, financial management, using the phone, meal preparation and shopping. Leslie received (0) minutes of interventions listed below.  x = interventions performed * = level of progression of activity     Self Care 9/7/2023                            Home Exercises and Education Provided     Education provided:   - home exercise program education provided  - progress towards goals     Written Home Exercises Provided: Patient instructed to cont prior HEP.  Exercises were reviewed and Leslie was able to demonstrate them prior to the end of the session. Leslie demonstrated good understanding of the home exercise program provided.     See EMR under Patient Instructions for exercises provided  5/17/2023 .        Assessment     Patient tolerated exercises well. Needs verbal cues for hand placement.     Leslie is progressing towards her goals and there are no updates to goals at this time. Patient prognosis is Good.     Patient will continue to benefit from skilled outpatient occupational therapy to address the deficits listed in the problem list on initial evaluation provide patient/family education and to maximize patient's level of independence in the home and community environment.     Patient's spiritual, cultural and educational needs considered and patient agreeable to plan of care and goals.    Anticipated barriers to occupational therapy: Cognition, home exercise program compliance    Goals:     Short Term Goals:  6 weeks  Progress    Pain: Patient will demonstrate improved pain by reports of less than or equal to 7/10 worst pain on the verbal rating scale in order to progress toward maximal functional ability and improve quality of life. GM   Function: Patient will demonstrate improved function as indicated by a functional limitation score of less than or equal to 36 out of 100 on FOTO. GM   Mobility: Patient will improve active range of motion to 50% of stated goals, listed in objective measures  above, in order to progress towards independence with functional activities.  GM   Strength: Patient will improve strength to 50% of stated goals, listed in objective measures above, in order to progress towards independence with functional activities.  GM   HEP: Patient will demonstrate independence with home exercise program in order to progress toward functional independence. GM      Long Term Goals:  12 weeks  Progress   Pain: Patient will demonstrate improved pain by reports of less than or equal to 6/10 worst pain on the verbal rating scale in order to progress toward maximal functional ability and improve quality of life.   GM   Function: Patient will demonstrate improved function as indicated by a functional limitation score of less than or equal to 36 out of 100 on FOTO. GM   Mobility: Patient will improve active range of motion to stated goals, listed in objective measures above, in order to return to maximal functional potential and improve quality of life. GM   Strength: Patient will improve strength to stated goals, listed in objective measures above, in order to improve functional independence and quality of life. GM   Patient will return to normal ADL's, IADL's, community involvement, recreational activities, and work-related activities with less than or equal to 5/10 pain and maximal function.  GM      Goals Key:  PC= Progressing/Continue;       PM= Partially Met;       GM= Goal Met,      DC= Discontinue      Discharge reason: Patient has met all of his/her goals    Plan     This patient is discharged from Occupational Therapy    Ivana Gallo, OT ,OTD, OTR/L  9/7/2023

## 2023-09-07 NOTE — TELEPHONE ENCOUNTER
----- Message from Allison Nevarez sent at 9/7/2023  3:49 PM CDT -----  Contact: Ariane/granddaughter  Patient's granddaughter is calling to speak with someone regarding medication. Requesting to know the status of medication being refilled. Please give Ms. Thakkar a call back at [phone] when possible.  Thank you,  TH

## 2023-09-14 ENCOUNTER — TELEPHONE (OUTPATIENT)
Dept: ORTHOPEDICS | Facility: CLINIC | Age: 83
End: 2023-09-14
Payer: MEDICARE

## 2023-09-14 NOTE — TELEPHONE ENCOUNTER
Returned the patient's phone call in regards to their message. Informed them that they have the soonest appointment with Dr. Ibarra. Also informed them that it would be to soon for them to come in due to the fact that they just received a steroid injection on 07/06/23. Informed the patient that they can get the steroid injection every 3 months. Verbalized understanding.         ----- Message from Crystal Saab MA sent at 9/14/2023  2:46 PM CDT -----  Contact: pt  Type:  Sooner Apoointment Request    Caller is requesting a sooner appointment.  Caller declined first available appointment listed below.  Caller will not accept being placed on the waitlist and is requesting a message be sent to doctor.  Name of Caller:pt /ravin   When is the first available appointment?10/9  Symptoms:need sooner appt , pt in pain   Would the patient rather a call back or a response via [facility]? Call     Best Call Back Number:[phone] (home)    Additional Information: n/a

## 2023-09-22 DIAGNOSIS — I10 ESSENTIAL HYPERTENSION: ICD-10-CM

## 2023-09-22 RX ORDER — METOPROLOL SUCCINATE 25 MG/1
25 TABLET, EXTENDED RELEASE ORAL DAILY
Qty: 90 TABLET | Refills: 3 | Status: SHIPPED | OUTPATIENT
Start: 2023-09-22 | End: 2024-09-21

## 2023-09-30 ENCOUNTER — EXTERNAL CHRONIC CARE MANAGEMENT (OUTPATIENT)
Dept: PRIMARY CARE CLINIC | Facility: CLINIC | Age: 83
End: 2023-09-30
Payer: MEDICARE

## 2023-09-30 PROCEDURE — 99499 NO LOS: ICD-10-PCS | Mod: S$PBB,,, | Performed by: FAMILY MEDICINE

## 2023-09-30 PROCEDURE — 99439 CHRNC CARE MGMT STAF EA ADDL: CPT | Mod: PBBFAC,27 | Performed by: FAMILY MEDICINE

## 2023-09-30 PROCEDURE — 99499 UNLISTED E&M SERVICE: CPT | Mod: S$PBB,,, | Performed by: FAMILY MEDICINE

## 2023-09-30 PROCEDURE — 99490 CHRNC CARE MGMT STAFF 1ST 20: CPT | Mod: PBBFAC,25 | Performed by: FAMILY MEDICINE

## 2023-10-09 ENCOUNTER — TELEPHONE (OUTPATIENT)
Dept: ORTHOPEDICS | Facility: CLINIC | Age: 83
End: 2023-10-09
Payer: MEDICARE

## 2023-10-09 ENCOUNTER — OFFICE VISIT (OUTPATIENT)
Dept: ORTHOPEDICS | Facility: CLINIC | Age: 83
End: 2023-10-09
Payer: MEDICARE

## 2023-10-09 ENCOUNTER — TELEPHONE (OUTPATIENT)
Dept: ORTHOPEDICS | Facility: CLINIC | Age: 83
End: 2023-10-09

## 2023-10-09 VITALS — WEIGHT: 138 LBS | HEIGHT: 61 IN | BODY MASS INDEX: 26.06 KG/M2

## 2023-10-09 DIAGNOSIS — M17.11 ARTHRITIS OF KNEE, RIGHT: Primary | ICD-10-CM

## 2023-10-09 DIAGNOSIS — M23.51 RECURRENT RIGHT KNEE INSTABILITY: ICD-10-CM

## 2023-10-09 DIAGNOSIS — Z98.1 HISTORY OF LUMBAR FUSION: ICD-10-CM

## 2023-10-09 PROCEDURE — 99213 OFFICE O/P EST LOW 20 MIN: CPT | Mod: S$PBB,,, | Performed by: ORTHOPAEDIC SURGERY

## 2023-10-09 PROCEDURE — 99213 OFFICE O/P EST LOW 20 MIN: CPT | Mod: PBBFAC | Performed by: ORTHOPAEDIC SURGERY

## 2023-10-09 PROCEDURE — 99999 PR PBB SHADOW E&M-EST. PATIENT-LVL III: ICD-10-PCS | Mod: PBBFAC,,, | Performed by: ORTHOPAEDIC SURGERY

## 2023-10-09 PROCEDURE — 99213 PR OFFICE/OUTPT VISIT, EST, LEVL III, 20-29 MIN: ICD-10-PCS | Mod: S$PBB,,, | Performed by: ORTHOPAEDIC SURGERY

## 2023-10-09 PROCEDURE — 99999 PR PBB SHADOW E&M-EST. PATIENT-LVL III: CPT | Mod: PBBFAC,,, | Performed by: ORTHOPAEDIC SURGERY

## 2023-10-09 NOTE — TELEPHONE ENCOUNTER
----- Message from Sandy Dewitt sent at 10/9/2023 12:25 PM CDT -----  Contact: Ariane Thakkar requests a call back from the nurse. They might want to do the surgery. Please call her back at [phone].    Thanks  TS

## 2023-10-09 NOTE — PATIENT INSTRUCTIONS
Severe arthritis of the right knee   Your cardiologist said you maybe able to have you surgery if you wish/Dr. Martins  You we already tried numerous different injections from gel injections to different cortisone shots and they only last maybe 2 or 3 weeks  We will try Zilretta which is slow-release cortisone injection we will get her insurance to approve you for the right knee  We discussed total knee replacement I showed you on the model how it looks like and showed you an x-ray     Total knee replacement is done as outpatient surgery occasionally we keep people extended recovery overnight.  You go home with home physical therapy for 2 weeks and home health nurse.  After 2 weeks the stitches come out and you can start outpatient physical therapy.  It will take roughly 3-6 months for complete healing to occur.  Total knee replacement is roughly 2 hours with of surgery.  Most studies have shown that total knee replacement improve with time up to 18 months after surgery people do better and better.  Total knee replacement is considered 80% successful in decreasing pain and increasing function

## 2023-10-09 NOTE — PROGRESS NOTES
Subjective:     Patient ID: Leslie Wood is a 83 y.o. female.    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  She had a CT scan which I reviewed of her head that showed that she had a stroke and we do not think that she is a candidate for total knee replacement due to medical conditions.  I did tell him anesthesia might bring her level of function to 1 level lower than when she was preop and we should avoid it at this time.  She needs to recuperate completely from her stroke.  Her neurologist at told what ever she gets after 1 year in of therapy is there is what she is going to get.  She is going to physical therapy at the [facility] and they want something about her knee if they can strengthen that.  She does have a brace for her back that she wears but not for her knee.  She uses a walker to get around.  She is here with her granddaughter and we discussed natural products.  She knows she cannot take NSAIDs because she is on Plavix and will injure her stomach and history of GERD the.  She needs to take natural with stuff.  In the future I did tell her steroid might be the only option for her in may end up being prednisone pills when time comes in the future   She denies loss of bowel bladder control she is alert today and oriented and answering questions appropriately  Granddaughter is here with her helping.      05/19/2023   Severe right knee pain   Scratched by a dog on the left calf that is painful   Patient stated that the right knee Depo-Medrol injection given 03/24/2023 wore out 2 weeks ago.  She is willing to proceed with viscosupplementation since we are avoiding having surgery at all cost.  She stated the dog scratched her and ran into her and caused some bruising and worried about an area that seems  to be hard and inflamed.  She is using her Rollator to get around.  She is not a surgical candidate due to medical issues.  She can not take NSAIDs she is on Plavix and history of GERD   She is here with granddaughter very pleasant alert oriented telling jokes.    07/06/2023   Severe right knee arthritis  Cellulitis by a dog scratch which we treated last time with antibiotics and that is completely healed  The severe right knee arthritis we can do much said trying different injections and she is going to physical therapy who they recommended maybe bracing.  I did tell her skin is too thin and the brace might rub and create an issue.  She is using a walker to walk around she does see pain management including doctors [facility]  Complaining of severe pain to the back which I told her I can not help her with in her pain is 8/10.  The knee nenita and subluxes and she has pain throughout most pronounced right now on the anterolateral aspect where she points at.  I reviewed her medications and her medical conditions with her and her granddaughter  No fever no chills no shortness of breath no difficulty with chewing swallowing loss of bowel bladder control or blurry vision double vision loss sense smell or taste      10/09/2023   Right knee severe arthritis  The cellulitis from a dog scratch all resolved   Seen recently by Cardiology Dr. Martins and she was told if she wants to have surgery she can.  She is thinking about not be able to tolerate the pain she is in right now.  There is injections they all lot last maybe 2 weeks.  The last Kenalog injection barely lasted 2 weeks.  She is looking that maybe she can have her surgery we discussed it today with granddaughter and the patient and showed her on the model and showed her x-rays of total knees.  I did tell her it takes roughly 3-6 months for complete healing to occur.  We have tried almost everything except Zilretta.  We will see if that  helps long enough so we can avoid surgery.  Patient wants to have her surgery done but would like to still try 1 more thing prior to undergoing surgery.      No fever no chills no shortness of breath.  She uses a Rollator to walk around  Past Medical History:   Diagnosis Date    Arthritis     Cataract     GERD (gastroesophageal reflux disease)     Heart attack 05/18/2022    Heart attack 05/23/2022    Hyperlipidemia     Hypertension     Stroke      Past Surgical History:   Procedure Laterality Date    ADENOIDECTOMY      ADRENAL GLAND SURGERY      APPENDECTOMY      BACK SURGERY      fusion l 4-5 s 1,2,3  fusion l 2-3    CORONARY ANGIOGRAPHY N/A 05/20/2022    Procedure: ANGIOGRAM, CORONARY ARTERY;  Surgeon: Domingo Martins MD;  Location: [facility] CATH LAB;  Service: Cardiology;  Laterality: N/A;    CORONARY ANGIOGRAPHY N/A 05/24/2022    Procedure: ANGIOGRAM, CORONARY ARTERY;  Surgeon: Domingo Martins MD;  Location: [facility] CATH LAB;  Service: Cardiology;  Laterality: N/A;    EYE SURGERY      HEMORRHOID SURGERY      HERNIA REPAIR      HYSTERECTOMY      partial    indirect lumbar decompression      percutaneous placement of interspinous extension blocker    LEFT HEART CATHETERIZATION Left 05/20/2022    Procedure: CATHETERIZATION, HEART, LEFT;  Surgeon: Domingo Martins MD;  Location: [facility] CATH LAB;  Service: Cardiology;  Laterality: Left;    TONSILLECTOMY       Family History   Problem Relation Age of Onset    Heart disease Mother     Hypertension Mother     Diabetes Mother     Hypertension Father     Kidney disease Father     Breast cancer Sister      Social History     Socioeconomic History    Marital status:    Tobacco Use    Smoking status: Never    Smokeless tobacco: Never   Substance and Sexual Activity    Alcohol use: No    Drug use: No    Sexual activity: Not Currently     Medication List with Changes/Refills   Current Medications    ASPIRIN (ECOTRIN) 81 MG EC TABLET    Take 1 tablet (81 mg total) by mouth  once daily.    ATORVASTATIN (LIPITOR) 80 MG TABLET    Take 0.5 tablets (40 mg total) by mouth every morning.    DULOXETINE (CYMBALTA) 30 MG CAPSULE    Take 30 mg by mouth once daily.    FUROSEMIDE (LASIX) 20 MG TABLET    TAKE 1 TABLET BY MOUTH ONCE A DAY    LOSARTAN (COZAAR) 50 MG TABLET    Take 1 tablet (50 mg total) by mouth 2 (two) times a day.    METOPROLOL SUCCINATE (TOPROL-XL) 25 MG 24 HR TABLET    Take 1 tablet (25 mg total) by mouth once daily.    NIFEDIPINE (ADALAT CC) 30 MG TBSR    Take 1 tablet (30 mg total) by mouth once daily.    NUCYNTA 50 MG TAB    Take 1 tablet (50 mg total) by mouth every 8 (eight) hours as needed (severe pain). RESTART WHEN OK PER PAIN MANAGEMENT PROVIDER    TIZANIDINE (ZANAFLEX) 4 MG TABLET         Review of patient's allergies indicates:   Allergen Reactions    Amitriptyline     Hydrochlorothiazide      Causes muscle cramping    Lisinopril      hyperkalemia    Oxycodone     Percocet [oxycodone-acetaminophen] Other (See Comments)     Seizures    Belbuca [buprenorphine hcl] Nausea And Vomiting and Other (See Comments)     Black out     Codeine Nausea Only and Rash    Prazosin Other (See Comments)     dizziness     Review of Systems   Constitutional: Negative for decreased appetite.   HENT:  Negative for tinnitus.    Eyes:  Negative for double vision.   Cardiovascular:  Negative for chest pain.   Respiratory:  Negative for wheezing.    Hematologic/Lymphatic: Negative for bleeding problem.   Skin:  Negative for dry skin.   Musculoskeletal:  Positive for arthritis, back pain, joint pain and muscle weakness. Negative for gout, neck pain and stiffness.   Gastrointestinal:  Negative for abdominal pain.   Genitourinary:  Negative for bladder incontinence.   Neurological:  Negative for numbness, paresthesias and sensory change.   Psychiatric/Behavioral:  Negative for altered mental status.        Objective:   Body mass index is 26.07 kg/m².  There were no vitals filed for this visit.        General    Constitutional: She is oriented to person, place, and time. She appears well-developed.   HENT:   Head: Atraumatic.   Eyes: EOM are normal.   Pulmonary/Chest: Effort normal.   Neurological: She is alert and oriented to person, place, and time.   Psychiatric: Judgment normal.           Ambulating well with a walker very steady  In the sitting position her pelvis is level  Bilateral hips passive motion without pain in the groin.    Hip flexors and abductors and adductors are slightly weak at 5-/5   Right knee with mild to moderate swelling.  There is no warmness to touch.  There is crepitus with motion.  She has 5-120 degrees and range of motion.  No defect in the patella or quadriceps tendon.  There is weakness around 4/5 in the quads and hamstrings.  Pain is throughout the knee joint .  Slightly loose lateral collateral to valgus and varus stressing with subluxation and clicking felt today  Left knee mild degeneration and mild tenderness.  There is no swelling.  Good motion 0-125.  No defect in the patella or quadriceps tendon   Left calf with scratches on the medial aspect proximal 3rd.  Resolved .  There is erythema and induration around 2 by 3 cm where the scratches are.  There is ecchymosis around the area also resolved.  Calves are soft bilaterally and nontender  There is multiple varicosities in the lower extremity  Skin over the knees within normal limits no obvious lesions    Relevant imaging results reviewed and interpreted by me, discussed with the patient and / or family today     X-ray 11/10/2022 of the hips within normal limits no evidence of arthritis  X-ray of the knees I 11/10/2022 on the right side showing there is a suprapatellar hematoma there is marginal osteophytic changes there is medial subluxation of the femur on the tibia consistent with severe arthritic change.  Assessment:     Encounter Diagnoses   Name Primary?    Arthritis of knee, right Yes    Recurrent right knee  instability     History of lumbar fusion         Plan:   Arthritis of knee, right    Recurrent right knee instability    History of lumbar fusion         Patient Instructions   Severe arthritis of the right knee   Your cardiologist said you maybe able to have you surgery if you wish/Dr. Martins  You we already tried numerous different injections from gel injections to different cortisone shots and they only last maybe 2 or 3 weeks  We will try Zilretta which is slow-release cortisone injection we will get her insurance to approve you for the right knee  We discussed total knee replacement I showed you on the model how it looks like and showed you an x-ray     Total knee replacement is done as outpatient surgery occasionally we keep people extended recovery overnight.  You go home with home physical therapy for 2 weeks and home health nurse.  After 2 weeks the stitches come out and you can start outpatient physical therapy.  It will take roughly 3-6 months for complete healing to occur.  Total knee replacement is roughly 2 hours with of surgery.  Most studies have shown that total knee replacement improve with time up to 18 months after surgery people do better and better.  Total knee replacement is considered 80% successful in decreasing pain and increasing function          Disclaimer: This note was prepared using a voice recognition system and is likely to have sound alike errors within the text.

## 2023-10-09 NOTE — TELEPHONE ENCOUNTER
Called and spoke with Ariane - informed patient we will see her in 2 weeks    Verbalized understanding and thankful for call

## 2023-10-17 ENCOUNTER — OFFICE VISIT (OUTPATIENT)
Dept: NEUROLOGY | Facility: CLINIC | Age: 83
End: 2023-10-17
Payer: MEDICARE

## 2023-10-17 DIAGNOSIS — I69.30 LATE EFFECT OF CEREBROVASCULAR ACCIDENT (CVA): Primary | ICD-10-CM

## 2023-10-17 DIAGNOSIS — R26.9 GAIT DIFFICULTY: ICD-10-CM

## 2023-10-17 DIAGNOSIS — R29.818 FINE MOTOR IMPAIRMENT: ICD-10-CM

## 2023-10-17 DIAGNOSIS — I69.354 HEMIPLEGIA AND HEMIPARESIS FOLLOWING CEREBRAL INFARCTION AFFECTING LEFT NON-DOMINANT SIDE: ICD-10-CM

## 2023-10-17 DIAGNOSIS — R29.898 FINE MOTOR IMPAIRMENT: ICD-10-CM

## 2023-10-17 DIAGNOSIS — F41.9 ANXIETY AND DEPRESSION: ICD-10-CM

## 2023-10-17 DIAGNOSIS — H53.462 HEMIANOPIA OF LEFT EYE: ICD-10-CM

## 2023-10-17 DIAGNOSIS — G45.9 TIA (TRANSIENT ISCHEMIC ATTACK): ICD-10-CM

## 2023-10-17 DIAGNOSIS — Z82.0 FAMILY HISTORY OF PARKINSON DISEASE: ICD-10-CM

## 2023-10-17 DIAGNOSIS — I10 ESSENTIAL HYPERTENSION: ICD-10-CM

## 2023-10-17 DIAGNOSIS — F32.A ANXIETY AND DEPRESSION: ICD-10-CM

## 2023-10-17 DIAGNOSIS — R25.1 TREMORS OF NERVOUS SYSTEM: ICD-10-CM

## 2023-10-17 DIAGNOSIS — R47.01 APHASIA: ICD-10-CM

## 2023-10-17 DIAGNOSIS — R41.0 DISORIENTATION: ICD-10-CM

## 2023-10-17 PROCEDURE — 99213 PR OFFICE/OUTPT VISIT, EST, LEVL III, 20-29 MIN: ICD-10-PCS | Mod: 95,,, | Performed by: NURSE PRACTITIONER

## 2023-10-17 PROCEDURE — 99213 OFFICE O/P EST LOW 20 MIN: CPT | Mod: 95,,, | Performed by: NURSE PRACTITIONER

## 2023-10-17 NOTE — PROGRESS NOTES
Subjective:       Patient ID: Leslie Wood is a 83 y.o. female.    Chief Complaint: No chief complaint on file.    Previous neurologist TRUONG Renae       HPI The patient is presenting with longstanding tremors that started 2017. The tremors are mainly in both arms symmetrically. The tremors do emerge during action like writing. Patient reports that the only time she notice them.  No rest tremors. No neck tremors. No leg tremors upon standing. No voice tremors. No muscle rigidity of muscle stiffness. Patient has postural instability due to DDD of spine. No memory loss or dementia. Cannot tell if alcohol improves the tremors because the patient does not drink. Anxiety and emotional stress exacerbates the tremor. No significant diurnal variation in the tremors.last fall 1 month ago no serious injury, eased to the floor. History of strokes had aphasia and slurred speech on stroke prevention medications. No head injury. No stimulants on board.  Chronic Back pain, and Knee pain has Pain stimulator, sees Pain management. MRI/MRA unable to be done d/t nerve stimulator that is not compatible with MRI.  Neurology  No new meds started recently. Multiple family members have similar tremors. Brother had Parkinsons disease. The patient drinks cups of caffeinated Tea drinks daily. Patient has concluded speech therapy, recently started OT/PT. Patient goes multiple times per week. Patient find if very helpful.         INTERVAL HISTORY: 10-: Patient present for ET follow up and update plan of care. Joined by granddaughter. Patient doing well. Grand daughter reports patient has become less active and has difficulty to walking and with speech. Requested therapy, she reports it was very beneficial in the past. No changes in tremor. Not on any current treatment. Patient did not complete Amisha scan she would like hold off until further notice.       The originating site (patient location) is: Home.        The distant site  (neurologist location) is: [facility] at [facility].        The chief complaint leading to consultation is: F/U Multiple complaints ET/ Gait problems      Visit type: Virtual visit with synchronous audio and video.        Total time spent with patient: 15 minutes         Special circumstances: This visit occurred during COVID-19 Pandemic Public Health Emergency.         Consent: The patient verbally consented to participating in the video visit and informed that may decline to receive medical services by telemedicine and may withdraw from such care at any time.        I discussed with the patient the nature of our telemedicine visits, that:        I  would evaluate the patient and recommend diagnostics and treatments based on my assessment.     Our sessions are not being recorded and that personal health information is protected.     Our team would provide follow up care in person if/when the patient needs it.     Virtual (video/telemedicine) visits have significant limitations. A telemedicine exam is primarily focused on the history and what I can observe. Several critical parts of the neurological exam cannot be performed.       Review of Systems   Constitutional: Negative.    HENT:  Positive for hearing loss.    Eyes:  Positive for visual disturbance.   Endocrine: Negative.    Genitourinary: Negative.    Musculoskeletal:  Positive for arthralgias, back pain, gait problem, myalgias and neck pain.   Skin: Negative.    Allergic/Immunologic: Negative.    Neurological:  Positive for tremors.   Hematological: Negative.    Psychiatric/Behavioral:  Positive for sleep disturbance. Negative for agitation, behavioral problems, confusion, hallucinations, self-injury and suicidal ideas. The patient is not nervous/anxious and is not hyperactive.                  Current Outpatient Medications:     aspirin (ECOTRIN) 81 MG EC tablet, Take 1 tablet (81 mg total) by mouth once daily., Disp: 90 tablet, Rfl: 3     atorvastatin (LIPITOR) 80 MG tablet, Take 0.5 tablets (40 mg total) by mouth every morning., Disp: 30 tablet, Rfl: 5    DULoxetine (CYMBALTA) 30 MG capsule, Take 30 mg by mouth once daily., Disp: , Rfl:     furosemide (LASIX) 20 MG tablet, TAKE 1 TABLET BY MOUTH ONCE A DAY, Disp: 30 tablet, Rfl: 11    losartan (COZAAR) 50 MG tablet, Take 1 tablet (50 mg total) by mouth 2 (two) times a day., Disp: 180 tablet, Rfl: 1    metoprolol succinate (TOPROL-XL) 25 MG 24 hr tablet, Take 1 tablet (25 mg total) by mouth once daily., Disp: 90 tablet, Rfl: 3    NIFEdipine (ADALAT CC) 30 MG TbSR, Take 1 tablet (30 mg total) by mouth once daily., Disp: 30 tablet, Rfl: 0    NUCYNTA 50 mg Tab, Take 1 tablet (50 mg total) by mouth every 8 (eight) hours as needed (severe pain). RESTART WHEN OK PER PAIN MANAGEMENT PROVIDER, Disp: 1 tablet, Rfl: 0    tiZANidine (ZANAFLEX) 4 MG tablet, , Disp: , Rfl:   Past Medical History:   Diagnosis Date    Arthritis     Cataract     GERD (gastroesophageal reflux disease)     Heart attack 05/18/2022    Heart attack 05/23/2022    Hyperlipidemia     Hypertension     Stroke      Past Surgical History:   Procedure Laterality Date    ADENOIDECTOMY      ADRENAL GLAND SURGERY      APPENDECTOMY      BACK SURGERY      fusion l 4-5 s 1,2,3  fusion l 2-3    CORONARY ANGIOGRAPHY N/A 05/20/2022    Procedure: ANGIOGRAM, CORONARY ARTERY;  Surgeon: Domingo Martins MD;  Location: [facility] CATH LAB;  Service: Cardiology;  Laterality: N/A;    CORONARY ANGIOGRAPHY N/A 05/24/2022    Procedure: ANGIOGRAM, CORONARY ARTERY;  Surgeon: Domingo Martins MD;  Location: [facility] CATH LAB;  Service: Cardiology;  Laterality: N/A;    EYE SURGERY      HEMORRHOID SURGERY      HERNIA REPAIR      HYSTERECTOMY      partial    indirect lumbar decompression      percutaneous placement of interspinous extension blocker    LEFT HEART CATHETERIZATION Left 05/20/2022    Procedure: CATHETERIZATION, HEART, LEFT;  Surgeon: Domingo Martins MD;   Location: [facility] CATH LAB;  Service: Cardiology;  Laterality: Left;    TONSILLECTOMY       Social History     Socioeconomic History    Marital status:    Tobacco Use    Smoking status: Never    Smokeless tobacco: Never   Substance and Sexual Activity    Alcohol use: No    Drug use: No    Sexual activity: Not Currently             Past/Current Medical/Surgical History, Past/Current Social History, Past/Current Family History and Past/Current Medications were reviewed in detail.        Objective:           VITAL SIGNS WERE REVIEWED      GENERAL APPEARANCE:     The patient looks comfortable.    Body habitus is normal 25.12 KG    No signs of respiratory distress.    Normal breathing pattern.    No dysmorphic features    Normal eye contact.     GENERAL MEDICAL EXAM:    HEENT:  Head is atraumatic normocephalic.     No tender temporal arteries. Fundoscopic (Ophthalmoscopic) exam showed no disc edema.      Neck and Axillae: No JVD. No visible lesions.    No carotid bruits. No thyromegaly. No lymphadenopathy.    Cardiopulmonary: No cyanosis. No tachypnea. Normal respiratory effort.    Clear breath sounds. S1, S2 with regular rhythm . No murmurs.     Gastrointestinal/Urogenital:  No jaundice. No stomas or lesions. No visible hernias. No catheters.     Abdomen is soft non-tender. No masses or organomegaly.    Skin, Hair and Nails: No pathognonomic skin rash. No neurofibromatosis. No visible lesions.No stigmata of autoimmune disease. No clubbing.    Skin is warm and moist. No palpable masses.    Limbs: No varicose veins. No visible swelling.    No palpable edema. Pulses are symmetric. Pedal pulses are palpable.      Muskoskeletal: No   +OA visible deformities.No visible lesions. Poor posture    No spine tenderness. No signs of longstanding neuropathy. No dislocations or fractures.            Neurologic Exam     Mental Status   Oriented to person, place, and time.   Registration: recalls 3 of 3 objects. Recall at 5 minutes:  recalls 3 of 3 objects. Follows 3 step commands.   Attention: normal. Concentration: normal.   Speech: speech is normal and not slurred   Level of consciousness: alert  Knowledge: good and consistent with education. Able to perform simple calculations.   Able to name object. Able to read. Able to repeat. Able to write. Normal comprehension.     Cranial Nerves     CN II   Visual fields full to confrontation.   Visual acuity: normal with correction  Right visual field deficit: none  Left visual field deficit: none     CN III, IV, VI   Pupils are equal, round, and reactive to light.  Extraocular motions are normal.   Right pupil: Size: 2 mm. Shape: regular. Reactivity: brisk. Consensual response: intact. Accommodation: intact.   Left pupil: Size: 2 mm. Shape: regular. Reactivity: brisk. Consensual response: intact. Accommodation: intact.   CN III: no CN III palsy  CN VI: no CN VI palsy  Nystagmus: none   Diplopia: none  Ophthalmoparesis: none  Upgaze: normal  Downgaze: normal  Conjugate gaze: present  Vestibulo-ocular reflex: present    CN V   Facial sensation intact.   Right facial sensation deficit: none  Left facial sensation deficit: none    CN VII   Right facial weakness: none  Left facial weakness: none    CN VIII   CN VIII normal.   Hearing: impaired    CN IX, X   CN IX normal.   CN X normal.   Palate: symmetric  Right gag reflex: normal  Left gag reflex: normal    CN XI   CN XI normal.   Right sternocleidomastoid strength: normal  Left sternocleidomastoid strength: normal  Right trapezius strength: normal  Left trapezius strength: normal    CN XII   CN XII normal.   Tongue: not atrophic  Fasciculations: absent  Tongue deviation: none    Motor Exam   Muscle bulk: normal  Overall muscle tone: normal  Right arm tone: normal  Left arm tone: normal  Right arm pronator drift: absent  Left arm pronator drift: absent  Right leg tone: normal  Left leg tone: normal    Strength   Strength 5/5 throughout.   Right neck  flexion: 5/5  Left neck flexion: 5/5  Right neck extension: 5/5  Left neck extension: 5/5  Right deltoid: 5/5  Left deltoid: 5/5  Right biceps: 5/5  Left biceps: 5/5  Right triceps: 5/5  Left triceps: 5/5  Right wrist flexion: 5/5  Left wrist flexion: 5/5  Right wrist extension: 5/5  Left wrist extension: 5/5  Right interossei: 5/5  Left interossei: 5/5  Right iliopsoas: 5/5  Left iliopsoas: 5/5  Right quadriceps: 5/5  Left quadriceps: 5/5  Right hamstrin/5  Left hamstrin/5  Right glutei: 5/5  Left glutei: 5/5  Right anterior tibial: 5/5  Left anterior tibial: 55  Right posterior tibial: 5/5  Left posterior tibial: 55  Right peroneal: 55  Left peroneal: 55  Right gastroc: 5/5  Left gastroc: 55    Sensory Exam   Light touch normal.   Right arm light touch: normal  Left arm light touch: normal  Right leg light touch: normal  Left leg light touch: normal  Vibration normal.   Right arm vibration: normal  Left arm vibration: normal  Right leg vibration: normal  Left leg vibration: normal  Proprioception normal.   Right arm proprioception: normal  Left arm proprioception: normal  Right leg proprioception: normal  Left leg proprioception: normal  Pinprick normal.   Right arm pinprick: normal  Left arm pinprick: normal  Right leg pinprick: normal  Left leg pinprick: normal  Sensory deficit distribution on left: lateral cutaneous thigh  Graphesthesia: normal  Stereognosis: normal    Gait, Coordination, and Reflexes     Gait  Gait: wide-based    Coordination   Romberg: negative  Finger to nose coordination: normal  Heel to shin coordination: normal  Tandem walking coordination: normal    Tremor   Resting tremor: absent  Intention tremor: absent  Action tremor: left arm and right arm    Reflexes   Right brachioradialis: 2+  Left brachioradialis: 2+  Right biceps: 2+  Left biceps: 2+  Right triceps: 2+  Left triceps: 2+  Right patellar: 2+  Left patellar: 2+  Right achilles: 2+  Left achilles: 2+  Right :  1+  Left : 1+  Right Lugo: absent  Left Lugo: absent  Right ankle clonus: absent  Left ankle clonus: absent  Right pendular knee jerk: absent  Left pendular knee jerk: absentMild ET       Lab Results   Component Value Date    WBC 10.48 03/08/2023    HGB 14.4 03/08/2023    HCT 45.3 03/08/2023    MCV 90 03/08/2023     03/08/2023     Sodium   Date Value Ref Range Status   03/08/2023 143 136 - 145 mmol/L Final     Potassium   Date Value Ref Range Status   03/08/2023 4.0 3.5 - 5.1 mmol/L Final     Chloride   Date Value Ref Range Status   03/08/2023 104 95 - 110 mmol/L Final     CO2   Date Value Ref Range Status   03/08/2023 24 23 - 29 mmol/L Final     Glucose   Date Value Ref Range Status   03/08/2023 100 70 - 110 mg/dL Final     BUN   Date Value Ref Range Status   03/08/2023 28 (H) 8 - 23 mg/dL Final     Creatinine   Date Value Ref Range Status   03/08/2023 1.1 0.5 - 1.4 mg/dL Final     Calcium   Date Value Ref Range Status   03/08/2023 9.9 8.7 - 10.5 mg/dL Final     Total Protein   Date Value Ref Range Status   03/08/2023 7.7 6.0 - 8.4 g/dL Final     Albumin   Date Value Ref Range Status   03/08/2023 4.0 3.5 - 5.2 g/dL Final     Total Bilirubin   Date Value Ref Range Status   03/08/2023 1.0 0.1 - 1.0 mg/dL Final     Comment:     For infants and newborns, interpretation of results should be based  on gestational age, weight and in agreement with clinical  observations.    Premature Infant recommended reference ranges:  Up to 24 hours.............<8.0 mg/dL  Up to 48 hours............<12.0 mg/dL  3-5 days..................<15.0 mg/dL  6-29 days.................<15.0 mg/dL       Alkaline Phosphatase   Date Value Ref Range Status   03/08/2023 93 55 - 135 U/L Final     AST   Date Value Ref Range Status   03/08/2023 30 10 - 40 U/L Final     ALT   Date Value Ref Range Status   03/08/2023 32 10 - 44 U/L Final     Anion Gap   Date Value Ref Range Status   03/08/2023 15 8 - 16 mmol/L Final     eGFR if African  American   Date Value Ref Range Status   07/23/2022 >60 >60 mL/min/1.73 m^2 Final     eGFR if non    Date Value Ref Range Status   07/23/2022 >60 >60 mL/min/1.73 m^2 Final     Comment:     Calculation used to obtain the estimated glomerular filtration  rate (eGFR) is the CKD-EPI equation.        Lab Results   Component Value Date    PIBAMLKN20 408 12/02/2019     Lab Results   Component Value Date    TSH 1.171 02/01/2023 02-    CTA Head Neck       Chronic right posterior cerebral artery occlusion with right posterior cerebral artery territory infarct     Dense atherosclerotic plaque of the right and of the left cavernous ICA segments.     Bilateral common carotid artery bifurcation calcified plaque with low-grade 20% or less right internal carotid artery origin stenosis.     CT Head.   Chronic microvascular ischemic changes.       Reviewed the neuroimaging independently       Assessment:       1. Late effect of cerebrovascular accident (CVA)    2. Hemiplegia and hemiparesis following cerebral infarction affecting left non-dominant side    3. Disorientation    4. Fine motor impairment    5. Gait difficulty    6. Aphasia    7. Tremors of nervous system    8. Hemianopia of left eye    9. Family history of Parkinson disease    10. TIA (transient ischemic attack)    11. Essential hypertension    12. Anxiety and depression          Plan:     ET MILD FAMILY HISTORY OF PARKINSON DISEASE (BROTHER)        Unable to obtain  Brain MRI WO unable due to pain stimulator     Hold Amisha scan R/o PD     Avoid stimulants like caffeine, nicotineetc.    Cut down steroids if possible    Avoid hot drinks, drink from half empty glasses and wear heavy bracelet/watch.     Will clinically monitor tremors are not bothersome and very mild, no pharmacological options need at this time      Other pharmacological options include: Primidone Inderal and TPM    Non-pharmacological options include: Neuravive, Gamma Knife and  DBS.     I also counseled the patient about the fact the medication decreases the amplitude and not the rate of the tremor and less effective for titubition.  I also counseled the patient that the disease is slowly progressive.    HISTORY of STROKE/ GAIT DIFFICULTY/ HX OF STROKE/ APHASIA     Refer for PT/OT/ST evaluate and treat            MEDICAL/SURGICAL COMORBIDITIES     All relevant medical comorbidities noted and managed by primary care physician and medical care team.          MISCELLANEOUS MEDICAL PROBLEMS       HEALTHY LIFESTYLE AND PREVENTATIVE CARE    Encouraged the patient to adhere to the age-appropriate health maintenance guidelines including screening tests and vaccinations.     Discussed the overall importance of healthy lifestyle, optimal weight, exercise, healthy diet, good sleep hygiene and avoiding drugs including smoking, alcohol and recreational drugs. The patient verbalized full understanding.       Advised the patient to follow COVID-19 prevention measures.       I spent 20 minutes, more than 50 % spent face to face with the patient    Time spent in counseling and coordination of care including discussions etiology of diagnosis, pathogenesis of diagnosis, prognosis of diagnosis,, diagnostic results, impression and recommendations, diagnostic studies, management, risks and benefits of treatment, instructions of disease self-management, treatment instructions, follow up requirements, patient and family counseling/involvement in care compliance with treatment regimen. All of the patient's questions were answered during this discussion.     Jennifer Millan, MSN NP      Collaborating Provider: Gunnar Grover MD, FAAN Neurologist/Epileptologist

## 2023-10-23 ENCOUNTER — OFFICE VISIT (OUTPATIENT)
Dept: ORTHOPEDICS | Facility: CLINIC | Age: 83
End: 2023-10-23
Payer: MEDICARE

## 2023-10-23 VITALS
HEART RATE: 48 BPM | WEIGHT: 138 LBS | HEIGHT: 61 IN | BODY MASS INDEX: 26.06 KG/M2 | SYSTOLIC BLOOD PRESSURE: 183 MMHG | DIASTOLIC BLOOD PRESSURE: 72 MMHG

## 2023-10-23 DIAGNOSIS — Z98.1 HISTORY OF LUMBAR FUSION: ICD-10-CM

## 2023-10-23 DIAGNOSIS — M17.11 PRIMARY OSTEOARTHRITIS OF RIGHT KNEE: Primary | ICD-10-CM

## 2023-10-23 DIAGNOSIS — M23.51 RECURRENT RIGHT KNEE INSTABILITY: ICD-10-CM

## 2023-10-23 DIAGNOSIS — M17.11 ARTHRITIS OF KNEE, RIGHT: Primary | ICD-10-CM

## 2023-10-23 PROCEDURE — 99999PBSHW PR PBB SHADOW TECHNICAL ONLY FILED TO HB: Mod: JZ,PBBFAC,,

## 2023-10-23 PROCEDURE — 99213 OFFICE O/P EST LOW 20 MIN: CPT | Mod: PBBFAC,25 | Performed by: ORTHOPAEDIC SURGERY

## 2023-10-23 PROCEDURE — 99213 PR OFFICE/OUTPT VISIT, EST, LEVL III, 20-29 MIN: ICD-10-PCS | Mod: 25,S$PBB,, | Performed by: ORTHOPAEDIC SURGERY

## 2023-10-23 PROCEDURE — 99999 PR PBB SHADOW E&M-EST. PATIENT-LVL III: CPT | Mod: PBBFAC,,, | Performed by: ORTHOPAEDIC SURGERY

## 2023-10-23 PROCEDURE — 20610 LARGE JOINT ASPIRATION/INJECTION: R KNEE: ICD-10-PCS | Mod: S$PBB,RT,, | Performed by: ORTHOPAEDIC SURGERY

## 2023-10-23 PROCEDURE — 99999PBSHW PR PBB SHADOW TECHNICAL ONLY FILED TO HB: ICD-10-PCS | Mod: JZ,PBBFAC,,

## 2023-10-23 PROCEDURE — 99999 PR PBB SHADOW E&M-EST. PATIENT-LVL III: ICD-10-PCS | Mod: PBBFAC,,, | Performed by: ORTHOPAEDIC SURGERY

## 2023-10-23 PROCEDURE — 99213 OFFICE O/P EST LOW 20 MIN: CPT | Mod: 25,S$PBB,, | Performed by: ORTHOPAEDIC SURGERY

## 2023-10-23 PROCEDURE — 20610 DRAIN/INJ JOINT/BURSA W/O US: CPT | Mod: PBBFAC | Performed by: ORTHOPAEDIC SURGERY

## 2023-10-23 RX ADMIN — TRIAMCINOLONE ACETONIDE EXTENDED-RELEASE INJECTABLE SUSPENSION 32 MG: KIT INTRA-ARTICULAR at 08:10

## 2023-10-23 NOTE — PATIENT INSTRUCTIONS
Last injection did not seem to last more than 3 days   You do have bad arthritis in the right knee as well you have back issues and you have a stimulator in the lumbar spine and nerve as well as arthritis both could give you pain in the knee   Need to wear the brace they all do slight down you have to put it a little snug  You are to start OT and PT and speech therapy according to the neurologist  We proceed today with injection of Zilretta into the right knee 10/23/2023   This is a slow-release might take a week for it to work  Most people say they last them around 10 weeks  Will get her insurance to approve you for repeat injection  Will return to see me in 3 months

## 2023-10-23 NOTE — PROGRESS NOTES
Subjective:     Patient ID: Leslie Wood is a 83 y.o. female.    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  She had a CT scan which I reviewed of her head that showed that she had a stroke and we do not think that she is a candidate for total knee replacement due to medical conditions.  I did tell him anesthesia might bring her level of function to 1 level lower than when she was preop and we should avoid it at this time.  She needs to recuperate completely from her stroke.  Her neurologist at told what ever she gets after 1 year in of therapy is there is what she is going to get.  She is going to physical therapy at the [facility] and they want something about her knee if they can strengthen that.  She does have a brace for her back that she wears but not for her knee.  She uses a walker to get around.  She is here with her granddaughter and we discussed natural products.  She knows she cannot take NSAIDs because she is on Plavix and will injure her stomach and history of GERD the.  She needs to take natural with stuff.  In the future I did tell her steroid might be the only option for her in may end up being prednisone pills when time comes in the future   She denies loss of bowel bladder control she is alert today and oriented and answering questions appropriately  Granddaughter is here with her helping.      05/19/2023   Severe right knee pain   Scratched by a dog on the left calf that is painful   Patient stated that the right knee Depo-Medrol injection given 03/24/2023 wore out 2 weeks ago.  She is willing to proceed with viscosupplementation since we are avoiding having surgery at all cost.  She stated the dog scratched her and ran into her and caused some bruising and worried about an area that seems  to be hard and inflamed.  She is using her Rollator to get around.  She is not a surgical candidate due to medical issues.  She can not take NSAIDs she is on Plavix and history of GERD   She is here with granddaughter very pleasant alert oriented telling jokes.    07/06/2023   Severe right knee arthritis  Cellulitis by a dog scratch which we treated last time with antibiotics and that is completely healed  The severe right knee arthritis we can do much said trying different injections and she is going to physical therapy who they recommended maybe bracing.  I did tell her skin is too thin and the brace might rub and create an issue.  She is using a walker to walk around she does see pain management including doctors [facility] and  [facility]  Complaining of severe pain to the back which I told her I can not help her with in her pain is 8/10.  The knee nenita and subluxes and she has pain throughout most pronounced right now on the anterolateral aspect where she points at.  I reviewed her medications and her medical conditions with her and her granddaughter  No fever no chills no shortness of breath no difficulty with chewing swallowing loss of bowel bladder control or blurry vision double vision loss sense smell or taste      10/09/2023   Right knee severe arthritis  The cellulitis from a dog scratch all resolved   Seen recently by Cardiology Dr. Martins and she was told if she wants to have surgery she can.  She is thinking about not be able to tolerate the pain she is in right now.  There is injections they all lot last maybe 2 weeks.  The last Kenalog injection barely lasted 2 weeks.  She is looking that maybe she can have her surgery we discussed it today with granddaughter and the patient and showed her on the model and showed her x-rays of total knees.  I did tell her it takes roughly 3-6 months for complete healing to occur.  We have tried almost everything except Zilretta.  We will see if that  helps long enough so we can avoid surgery.  Patient wants to have her surgery done but would like to still try 1 more thing prior to undergoing surgery.      No fever no chills no shortness of breath.  She uses a Rollator to walk around      10/23/2023   Right knee severe arthritis  The cellulitis resolved from dog scratch  Today she is wearing a brace and she states that could slide down and she has to tighten it up a little bit.  I did tell her the wrap around brace is much easier to put on than the pull up braces.  She is using the walker to get around.  The neurologist saw her in wants a to undergo OT and PT as well as speech therapy she is about to start that   The injection given last time did not seem to last too long despite the fact she states her pain is 0/10   We will give Zilretta today since she change her mind about having surgery/right TKA.  I did tell her some of her pains are coming from her back she does have lumbar fusion and nerve stimulator in the lumbar spine   She walks with a Rollator  She is here with her daughter and granddaughter  Past Medical History:   Diagnosis Date    Arthritis     Cataract     GERD (gastroesophageal reflux disease)     Heart attack 05/18/2022    Heart attack 05/23/2022    Hyperlipidemia     Hypertension     Stroke      Past Surgical History:   Procedure Laterality Date    ADENOIDECTOMY      ADRENAL GLAND SURGERY      APPENDECTOMY      BACK SURGERY      fusion l 4-5 s 1,2,3  fusion l 2-3    CORONARY ANGIOGRAPHY N/A 05/20/2022    Procedure: ANGIOGRAM, CORONARY ARTERY;  Surgeon: Domingo Martins MD;  Location: [facility] CATH LAB;  Service: Cardiology;  Laterality: N/A;    CORONARY ANGIOGRAPHY N/A 05/24/2022    Procedure: ANGIOGRAM, CORONARY ARTERY;  Surgeon: Domingo Martins MD;  Location: [facility] CATH LAB;  Service: Cardiology;  Laterality: N/A;    EYE SURGERY      HEMORRHOID SURGERY      HERNIA REPAIR      HYSTERECTOMY      partial    indirect lumbar decompression       percutaneous placement of interspinous extension blocker    LEFT HEART CATHETERIZATION Left 05/20/2022    Procedure: CATHETERIZATION, HEART, LEFT;  Surgeon: Domingo Martins MD;  Location: [facility] CATH LAB;  Service: Cardiology;  Laterality: Left;    TONSILLECTOMY       Family History   Problem Relation Age of Onset    Heart disease Mother     Hypertension Mother     Diabetes Mother     Hypertension Father     Kidney disease Father     Breast cancer Sister      Social History     Socioeconomic History    Marital status:    Tobacco Use    Smoking status: Never    Smokeless tobacco: Never   Substance and Sexual Activity    Alcohol use: No    Drug use: No    Sexual activity: Not Currently     Medication List with Changes/Refills   Current Medications    ASPIRIN (ECOTRIN) 81 MG EC TABLET    Take 1 tablet (81 mg total) by mouth once daily.    ATORVASTATIN (LIPITOR) 80 MG TABLET    Take 0.5 tablets (40 mg total) by mouth every morning.    DULOXETINE (CYMBALTA) 30 MG CAPSULE    Take 30 mg by mouth once daily.    FUROSEMIDE (LASIX) 20 MG TABLET    TAKE 1 TABLET BY MOUTH ONCE A DAY    LOSARTAN (COZAAR) 50 MG TABLET    Take 1 tablet (50 mg total) by mouth 2 (two) times a day.    METOPROLOL SUCCINATE (TOPROL-XL) 25 MG 24 HR TABLET    Take 1 tablet (25 mg total) by mouth once daily.    NIFEDIPINE (ADALAT CC) 30 MG TBSR    Take 1 tablet (30 mg total) by mouth once daily.    NUCYNTA 50 MG TAB    Take 1 tablet (50 mg total) by mouth every 8 (eight) hours as needed (severe pain). RESTART WHEN OK PER PAIN MANAGEMENT PROVIDER    TIZANIDINE (ZANAFLEX) 4 MG TABLET         Review of patient's allergies indicates:   Allergen Reactions    Amitriptyline     Hydrochlorothiazide      Causes muscle cramping    Lisinopril      hyperkalemia    Oxycodone     Percocet [oxycodone-acetaminophen] Other (See Comments)     Seizures    Belbuca [buprenorphine hcl] Nausea And Vomiting and Other (See Comments)     Black out     Codeine Nausea Only  and Rash    Prazosin Other (See Comments)     dizziness     Review of Systems   Constitutional: Negative for decreased appetite.   HENT:  Negative for tinnitus.    Eyes:  Negative for double vision.   Cardiovascular:  Negative for chest pain.   Respiratory:  Negative for wheezing.    Hematologic/Lymphatic: Negative for bleeding problem.   Skin:  Negative for dry skin.   Musculoskeletal:  Positive for arthritis, back pain, joint pain and muscle weakness. Negative for gout, neck pain and stiffness.   Gastrointestinal:  Negative for abdominal pain.   Genitourinary:  Negative for bladder incontinence.   Neurological:  Negative for numbness, paresthesias and sensory change.   Psychiatric/Behavioral:  Negative for altered mental status.        Objective:   Body mass index is 26.07 kg/m².  Vitals:    10/23/23 0805   BP: (!) 183/72   Pulse: (!) 48          General    Constitutional: She is oriented to person, place, and time. She appears well-developed.   HENT:   Head: Atraumatic.   Eyes: EOM are normal.   Pulmonary/Chest: Effort normal.   Neurological: She is alert and oriented to person, place, and time.   Psychiatric: Judgment normal.           Ambulating well with a walker very steady  In the sitting position her pelvis is level  Bilateral hips passive motion without pain in the groin.    Hip flexors and abductors and adductors are slightly weak at 5-/5   Right knee with mild to moderate swelling.  There is no warmness to touch.  There is crepitus with motion.  She has 5-120 degrees and range of motion.  No defect in the patella or quadriceps tendon.  There is weakness around 4/5 in the quads and hamstrings.  Pain is throughout the knee joint .  Slightly loose lateral collateral to valgus and varus stressing with subluxation and clicking felt today  Left knee mild degeneration and mild tenderness.  There is no swelling.  Good motion 0-125.  No defect in the patella or quadriceps tendon   Left calf with scratches on the  medial aspect proximal 3rd.  Resolved .  There is erythema and induration around 2 by 3 cm where the scratches are.  There is ecchymosis around the area also resolved.  Calves are soft bilaterally and nontender  There is multiple varicosities in the lower extremity  Skin over the knees within normal limits no obvious lesions    Relevant imaging results reviewed and interpreted by me, discussed with the patient and / or family today     X-ray 11/10/2022 of the hips within normal limits no evidence of arthritis  X-ray of the knees I 11/10/2022 on the right side showing there is a suprapatellar hematoma there is marginal osteophytic changes there is medial subluxation of the femur on the tibia consistent with severe arthritic change.  Assessment:     Encounter Diagnoses   Name Primary?    Arthritis of knee, right Yes    Recurrent right knee instability     History of lumbar fusion         Plan:   Arthritis of knee, right  -     Large Joint Aspiration/Injection: R knee    Recurrent right knee instability    History of lumbar fusion         Patient Instructions   Last injection did not seem to last more than 3 days   You do have bad arthritis in the right knee as well you have back issues and you have a stimulator in the lumbar spine and nerve as well as arthritis both could give you pain in the knee   Need to wear the brace they all do slight down you have to put it a little snug  You are to start OT and PT and speech therapy according to the neurologist  We proceed today with injection of Zilretta into the right knee 10/23/2023   This is a slow-release might take a week for it to work  Most people say they last them around 10 weeks  Will get her insurance to approve you for repeat injection  Will return to see me in 3 months        Disclaimer: This note was prepared using a voice recognition system and is likely to have sound alike errors within the text.

## 2023-10-23 NOTE — PROCEDURES
Large Joint Aspiration/Injection: R knee    Date/Time: 10/23/2023 8:00 AM    Performed by: Luis Ibarra MD  Authorized by: Luis Ibarra MD    Consent Done?:  Yes (Verbal)  Indications:  Arthritis and pain  Site marked: the procedure site was marked    Timeout: prior to procedure the correct patient, procedure, and site was verified    Prep: patient was prepped and draped in usual sterile fashion    Local anesthesia used?: No    Local anesthetic:  Topical anesthetic    Details:  Needle Size:  22 G  Ultrasonic Guidance for needle placement?: No    Approach:  Anterolateral  Location:  Knee  Site:  R knee  Medications:  32 mg triamcinolone acetonide 32 mg  Patient tolerance:  Patient tolerated the procedure well with no immediate complications     Right Bryan

## 2023-10-31 ENCOUNTER — EXTERNAL CHRONIC CARE MANAGEMENT (OUTPATIENT)
Dept: PRIMARY CARE CLINIC | Facility: CLINIC | Age: 83
End: 2023-10-31
Payer: MEDICARE

## 2023-10-31 PROCEDURE — 99490 CHRNC CARE MGMT STAFF 1ST 20: CPT | Mod: PBBFAC | Performed by: FAMILY MEDICINE

## 2023-10-31 PROCEDURE — 99490 PR CHRONIC CARE MGMT, 1ST 20 MIN: ICD-10-PCS | Mod: S$PBB,,, | Performed by: FAMILY MEDICINE

## 2023-10-31 PROCEDURE — 99490 CHRNC CARE MGMT STAFF 1ST 20: CPT | Mod: S$PBB,,, | Performed by: FAMILY MEDICINE

## 2023-11-02 ENCOUNTER — OFFICE VISIT (OUTPATIENT)
Dept: INTERNAL MEDICINE | Facility: CLINIC | Age: 83
End: 2023-11-02
Payer: MEDICARE

## 2023-11-02 VITALS
WEIGHT: 135.38 LBS | RESPIRATION RATE: 18 BRPM | TEMPERATURE: 97 F | HEART RATE: 60 BPM | HEIGHT: 61 IN | OXYGEN SATURATION: 96 % | BODY MASS INDEX: 25.56 KG/M2 | SYSTOLIC BLOOD PRESSURE: 120 MMHG | DIASTOLIC BLOOD PRESSURE: 68 MMHG

## 2023-11-02 DIAGNOSIS — E11.69 HYPERLIPIDEMIA ASSOCIATED WITH TYPE 2 DIABETES MELLITUS: ICD-10-CM

## 2023-11-02 DIAGNOSIS — I69.354 HEMIPLEGIA AND HEMIPARESIS FOLLOWING CEREBRAL INFARCTION AFFECTING LEFT NON-DOMINANT SIDE: ICD-10-CM

## 2023-11-02 DIAGNOSIS — E11.59 HYPERTENSION ASSOCIATED WITH DIABETES: ICD-10-CM

## 2023-11-02 DIAGNOSIS — I70.0 ATHEROSCLEROSIS OF AORTA: ICD-10-CM

## 2023-11-02 DIAGNOSIS — E11.9 TYPE 2 DIABETES MELLITUS WITHOUT COMPLICATION, WITHOUT LONG-TERM CURRENT USE OF INSULIN: Primary | ICD-10-CM

## 2023-11-02 DIAGNOSIS — E78.5 HYPERLIPIDEMIA ASSOCIATED WITH TYPE 2 DIABETES MELLITUS: ICD-10-CM

## 2023-11-02 DIAGNOSIS — I15.2 HYPERTENSION ASSOCIATED WITH DIABETES: ICD-10-CM

## 2023-11-02 PROCEDURE — G0008 ADMIN INFLUENZA VIRUS VAC: HCPCS | Mod: PBBFAC

## 2023-11-02 PROCEDURE — 99999PBSHW FLU VACCINE - QUADRIVALENT - ADJUVANTED: ICD-10-PCS | Mod: PBBFAC,,,

## 2023-11-02 PROCEDURE — 99999PBSHW FLU VACCINE - QUADRIVALENT - ADJUVANTED: Mod: PBBFAC,,,

## 2023-11-02 PROCEDURE — 99999 PR PBB SHADOW E&M-EST. PATIENT-LVL V: CPT | Mod: PBBFAC,,, | Performed by: FAMILY MEDICINE

## 2023-11-02 PROCEDURE — 99999 PR PBB SHADOW E&M-EST. PATIENT-LVL V: ICD-10-PCS | Mod: PBBFAC,,, | Performed by: FAMILY MEDICINE

## 2023-11-02 PROCEDURE — 99214 OFFICE O/P EST MOD 30 MIN: CPT | Mod: S$PBB,,, | Performed by: FAMILY MEDICINE

## 2023-11-02 PROCEDURE — 99215 OFFICE O/P EST HI 40 MIN: CPT | Mod: PBBFAC,25 | Performed by: FAMILY MEDICINE

## 2023-11-02 PROCEDURE — 99214 PR OFFICE/OUTPT VISIT, EST, LEVL IV, 30-39 MIN: ICD-10-PCS | Mod: S$PBB,,, | Performed by: FAMILY MEDICINE

## 2023-11-02 RX ORDER — PANTOPRAZOLE SODIUM 40 MG/1
40 TABLET, DELAYED RELEASE ORAL EVERY MORNING
COMMUNITY
Start: 2023-07-31 | End: 2023-11-02

## 2023-11-02 RX ORDER — ATORVASTATIN CALCIUM 40 MG/1
40 TABLET, FILM COATED ORAL DAILY
Qty: 90 TABLET | Refills: 3 | Status: SHIPPED | OUTPATIENT
Start: 2023-11-02 | End: 2024-02-15

## 2023-11-02 NOTE — PROGRESS NOTES
Subjective:       Patient ID: Leslie Wood is a 83 y.o. female here today to establish care.    Chief Complaint: Establish Care    Patient presents to clinic today to establish care.    Review of Systems   Constitutional:  Positive for fatigue. Negative for chills, fever and unexpected weight change.   HENT:  Positive for hearing loss, rhinorrhea and trouble swallowing. Negative for congestion, dental problem and ear pain.    Eyes:  Positive for visual disturbance. Negative for pain.   Respiratory:  Negative for cough and shortness of breath.    Cardiovascular:  Positive for leg swelling. Negative for chest pain and palpitations.   Gastrointestinal:  Positive for constipation. Negative for abdominal distention, abdominal pain, blood in stool, diarrhea, nausea and vomiting.   Genitourinary:  Negative for difficulty urinating and vaginal discharge.   Musculoskeletal:  Positive for arthralgias and myalgias.   Skin:  Negative for rash.   Neurological:  Positive for dizziness, weakness and numbness. Negative for headaches.   Hematological:  Negative for adenopathy. Bruises/bleeds easily.   Psychiatric/Behavioral:  Negative for dysphoric mood and sleep disturbance. The patient is not nervous/anxious.      Above positive ROS are chronic/stable per patient report.  Objective:      Physical Exam  Vitals reviewed.   Constitutional:       General: She is not in acute distress.     Appearance: She is well-developed.   HENT:      Head: Normocephalic and atraumatic.   Eyes:      General: Lids are normal. No scleral icterus.     Extraocular Movements: Extraocular movements intact.      Conjunctiva/sclera: Conjunctivae normal.      Pupils: Pupils are equal, round, and reactive to light.   Cardiovascular:      Rate and Rhythm: Normal rate and regular rhythm.      Heart sounds: No murmur heard.     No friction rub. No gallop.   Pulmonary:      Effort: Pulmonary effort is normal.      Breath sounds: Normal breath sounds. No  decreased breath sounds, wheezing, rhonchi or rales.   Neurological:      Mental Status: She is alert and oriented to person, place, and time.      Cranial Nerves: No cranial nerve deficit.      Gait: Gait abnormal (ambulates with rollator).   Psychiatric:         Mood and Affect: Mood and affect normal.         Assessment:       1. Type 2 diabetes mellitus without complication, without long-term current use of insulin    2. Hypertension associated with diabetes    3. Hyperlipidemia associated with type 2 diabetes mellitus    4. Atherosclerosis of aorta    5. Hemiplegia and hemiparesis following cerebral infarction affecting left non-dominant side        Plan:   1. Type 2 diabetes mellitus without complication, without long-term current use of insulin  -     Hemoglobin A1C; Future; Expected date: 11/02/2023    2. Hypertension associated with diabetes  -     TSH; Future; Expected date: 11/02/2023  -     CBC Auto Differential; Future; Expected date: 11/02/2023    3. Hyperlipidemia associated with type 2 diabetes mellitus  -     Comprehensive Metabolic Panel; Future; Expected date: 11/02/2023  -     Lipid Panel; Future; Expected date: 11/02/2023  -     atorvastatin (LIPITOR) 40 MG tablet; Take 1 tablet (40 mg total) by mouth once daily.  Dispense: 90 tablet; Refill: 3    4. Atherosclerosis of aorta  Overview:  cxr 11/20      5. Hemiplegia and hemiparesis following cerebral infarction affecting left non-dominant side    Other orders  -     Influenza - Quadrivalent (Adjuvanted)      Followed by Neurology, Cardiology, Dermatology, Orthopedics, Pain Management and Optho.  Health Maintenance reviewed/updated.

## 2023-11-02 NOTE — PATIENT INSTRUCTIONS
You are eligible for a covid booster, covid vaccines are available at most pharmacies.     Check with your pharmacist regarding RSV vaccine.      Check with your pharmacist regarding shingrix vaccine.

## 2023-11-08 ENCOUNTER — LAB VISIT (OUTPATIENT)
Dept: LAB | Facility: HOSPITAL | Age: 83
End: 2023-11-08
Attending: FAMILY MEDICINE
Payer: MEDICARE

## 2023-11-08 ENCOUNTER — CLINICAL SUPPORT (OUTPATIENT)
Dept: REHABILITATION | Facility: HOSPITAL | Age: 83
End: 2023-11-08
Payer: MEDICARE

## 2023-11-08 DIAGNOSIS — M25.60 RANGE OF MOTION DEFICIT: Primary | ICD-10-CM

## 2023-11-08 DIAGNOSIS — E11.9 TYPE 2 DIABETES MELLITUS WITHOUT COMPLICATION, WITHOUT LONG-TERM CURRENT USE OF INSULIN: ICD-10-CM

## 2023-11-08 DIAGNOSIS — M62.81 DECREASED MUSCLE STRENGTH: ICD-10-CM

## 2023-11-08 DIAGNOSIS — R29.818 FINE MOTOR IMPAIRMENT: ICD-10-CM

## 2023-11-08 DIAGNOSIS — R41.841 COGNITIVE COMMUNICATION DISORDER: Primary | ICD-10-CM

## 2023-11-08 DIAGNOSIS — R29.898 FINE MOTOR IMPAIRMENT: ICD-10-CM

## 2023-11-08 DIAGNOSIS — Z78.9 DECREASED ACTIVITIES OF DAILY LIVING (ADL): ICD-10-CM

## 2023-11-08 DIAGNOSIS — I69.354 HEMIPLEGIA AND HEMIPARESIS FOLLOWING CEREBRAL INFARCTION AFFECTING LEFT NON-DOMINANT SIDE: ICD-10-CM

## 2023-11-08 DIAGNOSIS — R41.0 DISORIENTATION: ICD-10-CM

## 2023-11-08 DIAGNOSIS — I69.30 LATE EFFECT OF CEREBROVASCULAR ACCIDENT (CVA): ICD-10-CM

## 2023-11-08 DIAGNOSIS — E78.5 HYPERLIPIDEMIA ASSOCIATED WITH TYPE 2 DIABETES MELLITUS: ICD-10-CM

## 2023-11-08 DIAGNOSIS — E11.69 HYPERLIPIDEMIA ASSOCIATED WITH TYPE 2 DIABETES MELLITUS: ICD-10-CM

## 2023-11-08 DIAGNOSIS — R47.01 APHASIA: ICD-10-CM

## 2023-11-08 DIAGNOSIS — E11.59 HYPERTENSION ASSOCIATED WITH DIABETES: ICD-10-CM

## 2023-11-08 DIAGNOSIS — I15.2 HYPERTENSION ASSOCIATED WITH DIABETES: ICD-10-CM

## 2023-11-08 DIAGNOSIS — R29.898 DECREASED GRIP STRENGTH OF LEFT HAND: ICD-10-CM

## 2023-11-08 LAB
ALBUMIN SERPL BCP-MCNC: 4.1 G/DL (ref 3.5–5.2)
ALP SERPL-CCNC: 84 U/L (ref 55–135)
ALT SERPL W/O P-5'-P-CCNC: 39 U/L (ref 10–44)
ANION GAP SERPL CALC-SCNC: 6 MMOL/L (ref 8–16)
AST SERPL-CCNC: 29 U/L (ref 10–40)
BASOPHILS # BLD AUTO: 0.05 K/UL (ref 0–0.2)
BASOPHILS NFR BLD: 0.5 % (ref 0–1.9)
BILIRUB SERPL-MCNC: 0.6 MG/DL (ref 0.1–1)
BUN SERPL-MCNC: 34 MG/DL (ref 8–23)
CALCIUM SERPL-MCNC: 9.8 MG/DL (ref 8.7–10.5)
CHLORIDE SERPL-SCNC: 110 MMOL/L (ref 95–110)
CHOLEST SERPL-MCNC: 139 MG/DL (ref 120–199)
CHOLEST/HDLC SERPL: 3.1 {RATIO} (ref 2–5)
CO2 SERPL-SCNC: 26 MMOL/L (ref 23–29)
CREAT SERPL-MCNC: 1.1 MG/DL (ref 0.5–1.4)
DIFFERENTIAL METHOD: ABNORMAL
EOSINOPHIL # BLD AUTO: 0.1 K/UL (ref 0–0.5)
EOSINOPHIL NFR BLD: 1.3 % (ref 0–8)
ERYTHROCYTE [DISTWIDTH] IN BLOOD BY AUTOMATED COUNT: 14.5 % (ref 11.5–14.5)
EST. GFR  (NO RACE VARIABLE): 49.2 ML/MIN/1.73 M^2
ESTIMATED AVG GLUCOSE: 114 MG/DL (ref 68–131)
GLUCOSE SERPL-MCNC: 93 MG/DL (ref 70–110)
HBA1C MFR BLD: 5.6 % (ref 4–5.6)
HCT VFR BLD AUTO: 47.9 % (ref 37–48.5)
HDLC SERPL-MCNC: 45 MG/DL (ref 40–75)
HDLC SERPL: 32.4 % (ref 20–50)
HGB BLD-MCNC: 15 G/DL (ref 12–16)
IMM GRANULOCYTES # BLD AUTO: 0.02 K/UL (ref 0–0.04)
IMM GRANULOCYTES NFR BLD AUTO: 0.2 % (ref 0–0.5)
LDLC SERPL CALC-MCNC: 64.8 MG/DL (ref 63–159)
LYMPHOCYTES # BLD AUTO: 2.5 K/UL (ref 1–4.8)
LYMPHOCYTES NFR BLD: 25.3 % (ref 18–48)
MCH RBC QN AUTO: 29 PG (ref 27–31)
MCHC RBC AUTO-ENTMCNC: 31.3 G/DL (ref 32–36)
MCV RBC AUTO: 93 FL (ref 82–98)
MONOCYTES # BLD AUTO: 0.9 K/UL (ref 0.3–1)
MONOCYTES NFR BLD: 8.5 % (ref 4–15)
NEUTROPHILS # BLD AUTO: 6.4 K/UL (ref 1.8–7.7)
NEUTROPHILS NFR BLD: 64.2 % (ref 38–73)
NONHDLC SERPL-MCNC: 94 MG/DL
NRBC BLD-RTO: 0 /100 WBC
PLATELET # BLD AUTO: 279 K/UL (ref 150–450)
PMV BLD AUTO: 10.2 FL (ref 9.2–12.9)
POTASSIUM SERPL-SCNC: 4.5 MMOL/L (ref 3.5–5.1)
PROT SERPL-MCNC: 7.3 G/DL (ref 6–8.4)
RBC # BLD AUTO: 5.18 M/UL (ref 4–5.4)
SODIUM SERPL-SCNC: 142 MMOL/L (ref 136–145)
TRIGL SERPL-MCNC: 146 MG/DL (ref 30–150)
TSH SERPL DL<=0.005 MIU/L-ACNC: 1.39 UIU/ML (ref 0.4–4)
WBC # BLD AUTO: 9.97 K/UL (ref 3.9–12.7)

## 2023-11-08 PROCEDURE — 84443 ASSAY THYROID STIM HORMONE: CPT | Performed by: FAMILY MEDICINE

## 2023-11-08 PROCEDURE — 97166 OT EVAL MOD COMPLEX 45 MIN: CPT

## 2023-11-08 PROCEDURE — 36415 COLL VENOUS BLD VENIPUNCTURE: CPT | Performed by: FAMILY MEDICINE

## 2023-11-08 PROCEDURE — 83036 HEMOGLOBIN GLYCOSYLATED A1C: CPT | Performed by: FAMILY MEDICINE

## 2023-11-08 PROCEDURE — 97110 THERAPEUTIC EXERCISES: CPT

## 2023-11-08 PROCEDURE — 85025 COMPLETE CBC W/AUTO DIFF WBC: CPT | Performed by: FAMILY MEDICINE

## 2023-11-08 PROCEDURE — 80053 COMPREHEN METABOLIC PANEL: CPT | Performed by: FAMILY MEDICINE

## 2023-11-08 PROCEDURE — 80061 LIPID PANEL: CPT | Performed by: FAMILY MEDICINE

## 2023-11-08 PROCEDURE — 96125 COGNITIVE TEST BY HC PRO: CPT | Mod: 59 | Performed by: SPEECH-LANGUAGE PATHOLOGIST

## 2023-11-08 NOTE — PLAN OF CARE
[facility]   Occupational Therapy Initial Neurological Evaluation     Date: 11/8/2023  Name: Leslie CASTELLON Israel  [facility] Number: [id number]    Therapy Diagnosis:   Encounter Diagnoses   Name Primary?    Late effect of cerebrovascular accident (CVA)     Hemiplegia and hemiparesis following cerebral infarction affecting left non-dominant side     Fine motor impairment     Range of motion deficit Yes    Decreased  strength of left hand     Decreased muscle strength     Decreased activities of daily living (ADL)      Physician: Marily Millan NP    Physician Orders: Occupational Therapy to Evaluate and Treat   Medical Diagnosis: R29.898 (ICD-10-CM) - Poor fine motor skills  Surgical Procedure and Date: N/A    Evaluation Date: 11/8/2023  Insurance Authorization Period Expiration: 10/17/2023 - 10/16/2024  Plan of Care Certification Period: 11/8/2023 - 02/08/2024  Progress Note Due: 12/8/2023    Date of Return to MD: N/A    Visit # / Visits authorized: 1/1  FOTO: 1/3    Precautions:  Standard    Time In: 9:20  Time Out: 10:00  Total Appointment Time (timed & untimed codes): 40 minutes      Subjective     Date of Onset: First CVA was in 2020 with Hemiparesis, TIA in December of 2022  Mechanism of Injury/ History of Current Condition: Patient reports that she had a CVA in 2020 that impacted her left upper extremity strength. She also had a decrease in right upper extremity at that time as well. Her right upper extremity strength returned but her left has not. She is having difficulty with fine motor tasks and is left hand dominate. She also has a torn rotator cuff from a previous fall and is interested on strengthening/improving her range of motion if possible. She previously received therapy with occupational therapy, Physical Therapy and Speech Therapy here at the [place]. She made significant improvements with strength and independence. However recently she has had a rapid decrease in strength and fell  yesterday.    Involved Side: Left side more than right  Dominant Side: Left    Surgical Procedure: NA  Imaging: NA    Previous Therapy: Receiving Physical Therapy and Speech Therapy     Patient's Goals for Therapy: Patient reported goals are to increase fine motor skills and right range of motion in order to return to prior functional level.    Falls: a few weeks ago due to her dog    Pain:  Pain Related Behaviors Observed: Yes   Functional Pain Scale Rating 0-10:   Average: 7/10   Best: 5/10   Worst: 9/10   Location: Back  Description: Aching and sharp  Aggravating Factors: Bending  Easing Factors: rest    Occupation: Compression garments   Working Presently: Retired      Functional Limitations/Social History:    Prior Level of Function: Independent and pain free with all ADL, IADL, community mobility and functional activities.   Current Level of Function: Needs assistance with some ADL, IADL, community mobility and functional activities with reports of increased pain and need for increased time and frequent breaks.      Limitation of Functional Status as follows:   ADLs/IADLs: See Below in Objective Section    Home/Living Environment : lives with their daughter  Home Access: Single Story  Leisure: Tries to clean and does laundry   DME: rolling walker, standard walker, single point cane, wheelchair, and stool grab bars, toilet riser       Past Medical History/Physical Systems Review:     Past Medical History:  Leslie Wood  has a past medical history of Arthritis, Cataract, GERD (gastroesophageal reflux disease), Heart attack, Heart attack, Hyperlipidemia, Hypertension, and Stroke.    Past Surgical History:  Leslie Wood  has a past surgical history that includes Back surgery; Eye surgery; Appendectomy; Adrenal gland surgery; Hemorrhoid surgery; Hernia repair; Hysterectomy; Tonsillectomy; Adenoidectomy; indirect lumbar decompression; Left heart catheterization (Left, 05/20/2022); Coronary angiography (N/A,  05/20/2022); and Coronary angiography (N/A, 05/24/2022).    Current Medications:  Leslie has a current medication list which includes the following prescription(s): aspirin, atorvastatin, duloxetine, furosemide, losartan, metoprolol succinate, nucynta, tizanidine, [DISCONTINUED] carvedilol, [DISCONTINUED] clonidine, [DISCONTINUED] diclofenac sodium, [DISCONTINUED] isosorbide mononitrate, [DISCONTINUED] metoprolol tartrate, [DISCONTINUED] nitroglycerin, and [DISCONTINUED] valsartan.    Allergies:  Review of patient's allergies indicates:   Allergen Reactions    Amitriptyline     Hydrochlorothiazide      Causes muscle cramping    Lisinopril      hyperkalemia    Oxycodone     Percocet [oxycodone-acetaminophen] Other (See Comments)     Seizures    Belbuca [buprenorphine hcl] Nausea And Vomiting and Other (See Comments)     Black out     Codeine Nausea Only and Rash    Prazosin Other (See Comments)     dizziness        Objective     Activities of Daily Living:  Feeding: Modified Independent  Grooming: Stand-by Assist  Hygiene: Stand-by Assist  Upper Body Dressing: Minimal Assist  Lower Body Dressing: Moderate Assist  Toileting: Minimal Assist  Bathing: Moderate Assist    Instrumental Activities of Daily Living:  Homecare: Maximal Assist  Cooking: Maximal Assist  Laundry: Maximal Assist  Yard Work: Maximal Assist  Use of Telephone: Supervision  Money Management: Maximal Assist  Medication Management: Maximal Assist  Handwriting: Minimal Assist  Technology Use: Moderate Assist      Cognitive Exam:  Oriented: Person, Place, Time, and Situation  Behaviors: normal, cooperative  Follows Commands/Attention: Easily distracted  Communication: clear/fluent  Memory: Impaired STM as determined by 3 word recall after 1 minute and 3 minutes  Safety Awareness/Insight to Disability: choosing to repress/ignore implications of diagnosis, treatment, and prognosis  Coping Skills/Emotional Control: Appropriate to situation    Physical  Exam:    Postural Examination/Scapula Alignment: Rounded shoulder and Head forward      Joint Evaluation  AROM  11/8/2023 AROM  11/8/2023 PROM   11/8/2023 Pain/Dysfunction with movement Goal  Right    Left Right Right  Active    Shoulder Flexion 0-180 Within normal limits  80 105  110   Shoulder Abduction 0-180  80 105  110   Shoulder External Rotation 0-90  25 40     Shoulder Internal Rotation 0-90  45 60     Shoulder Extension 0-60  10 25     Shoulder Horizontal Adduction 0-90  Within normal limits  Within normal limits      Elbow Flexion 0-150        Elbow Extension 0        Wrist Flexion 0-80        Wrist Extension 0-70        Wrist Supination 0-80        Wrist Pronation 0-80        Wrist Ulnar Deviation 0-30        Wrist Radial Deviation 0-20          Fist: normal      Strength 11/8/2023 11/8/2023    **within available ROM** Left Right Goal Left   Shoulder Flexion 3+/5 3-/5 4/5   Shoulder Abduction 3+/5 3-/5 4/5   Shoulder External Rotation  3+/5 3-/5 4/5   Shoulder Internal Rotation 3+/5 3-/5 4/5   Shoulder Extension 3+/5 3-/5 4/5   Shoulder Horizontal Adduction 3+/5 3-/5 4/5   Elbow Flexion 3+/5 3+/5 4/5   Elbow Extension 3+/5 3+/5 4/5   Wrist Flexion 3+/5 3+/5 4/5   Wrist Extension 3+/5 3+/5 4/5   Supination 3+/5 3+/5 4/5   Pronation 3+/5 3+/5 4/5   Ulnar Deviation 3+/5 3+/5 4/5   Radial Deviation 3+/5 3+/5 4/5      Strength: (MUNDO Dynamometer in lbs.) Average 3 trials, Position II:     11/8/2023 11/8/2023 Goal    Left Right Left   Rung II 25# 35# 31#     Pinch Strength (Measured in psi)     11/8/2023 11/8/2023    Left Right   2pt Pinch 4 psi 7 psi   3pt Pinch 4 psi 7 psi   Lateral Pinch 7 psi 10 psi     Coordination:   -     Fine Motor Coordination: impaired  -     Upper Extremity Coordination: intact  -     Lower Extremity Coordination: intact    Sensation:  Leslie  reports normal sensation    -    Light touch: bilateral intact    -    Sharp/Dull: bilateral intact     Balance:     -   Static Sit -  GOOD-: Takes MODERATE challenges from all directions but inconsistently    -   Dynamic Sit- GOOD-: Takes MODERATE challenges from all directions but inconsistently    -   Static Stand - POOR+: Needs MINIMAL assist to maintain    -   Dynamic Stand - POOR+: Needs MINIMAL assist to maintain    Endurance Deficit: moderate                   CMS Impairment/Limitation/Restriction for FOTO Stroke UESurvey    Therapist reviewed FOTO scores for Leslie Wood on 11/8/2023.   FOTO documents entered into [facility] - see Media section.    Limitation Score: 44%         Treatment     Total Treatment time separate from Evaluation time: 10 Minutes    Therapeutic Exercises: to develop strength, endurance, ROM, flexibility, posture and core stabilization. Leslie received (10) minutes of exercises listed below. x = performed today * = level of progression of activity     Therapeutic Exercise 11/8/2023    Interossei  - adduction pink foam  -abduction rubber bands x 20x bilateral   Lumbricals - pink foam x 20x bilateral   Radial adduction - rubber bands x 20x bilateral        Home Exercise Program/Education:    Education provided:   Patient educated on the impairments noted above and the effects of Occupational therapy intervention to improve overall condition and Quality of Life.   Patient was educated on all the above exercise prior/during/after for proper posture, positioning, and execution for safe performance with home exercise program.  Patient/Family Education: role of Occupational Therapy, goals for Occupational Therapy, scheduling/cancellations, and insurance limitations - patient verbalized understanding  Home Exercise Program - See Below    Written Home Exercises Provided: yes.  Exercises were reviewed and Leslie was able to demonstrate them prior to the end of the session. Patient was advised to perform these exercises free of pain, and to stop performing them if pain occurs.  Leslie demonstrated good  understanding of the education  provided.     See EMR under Patient Instructions for exercises provided  11/8/2023 .    Assessment       Leslie Wood is a 83 y.o. 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    Following medical record review it is determined that pt will benefit from occupational therapy services in order to maximize pain free and/or functional use of bilateral upper extremities. The following goals were discussed with the patient and patient is in agreement with them as to be addressed in the treatment plan. The patient's rehab potential is Fair.     Anticipated barriers to occupational therapy: Cognition, Pain    Plan of care discussed with patient: Yes  Patient's spiritual, cultural and educational needs considered and patient is agreeable to the plan of care and goals as stated below:     Medical Necessity is demonstrated by the following  Occupational Profile/History  Co-morbidities and personal factors that may impact the plan of care [] LOW: Brief chart review  [x] MODERATE: Expanded chart review   [] HIGH: Extensive chart review    Moderate / High Support Documentation:   Past Medical History:   Diagnosis Date    Arthritis     Cataract     GERD (gastroesophageal reflux disease)     Heart attack 05/18/2022    Heart attack 05/23/2022    Hyperlipidemia     Hypertension     Stroke         Examination  Performance deficits relating to physical, cognitive or psychosocial skills that result in activity limitations and/or participation restrictions  [] LOW: addressing 1-3 Performance deficits  [x] MODERATE: 3-5 Performance deficits  [] HIGH: 5+ Performance deficits (please support below)    Moderate / High Support Documentation:    Physical:  Joint Mobility  Joint Stability  Muscle Power/Strength  Muscle Endurance  Skin  Integrity/Scar Formation   Strength  Pinch Strength  Gross Motor Coordination  Fine Motor Coordination  Proprioception Functions  Tactile Functions  Pain    Cognitive:  Attention  Memory  Safety Awareness/Insight to Disability    Psychosocial:    No Deficits     Treatment Options [] LOW: Multiple options  [x] MODERATE: Several options  [] HIGH: Limited options      Decision Making/ Complexity Score: moderate       The following goals were discussed with the patient and patient is in agreement with them as to be addressed in the treatment plan.     Goals:    Short Term Goals:  6 weeks  Progress    Pain: Patient will demonstrate improved pain by reports of less than or equal to 7/10 worst pain on the verbal rating scale in order to progress toward maximal functional ability and improve quality of life. PC   Function: Patient will demonstrate improved function as indicated by a functional limitation score of less than or equal to 46 out of 100 on FOTO. PC   Mobility: Patient will improve active range of motion to 50% of stated goals, listed in objective measures above, in order to progress towards independence with functional activities.  PC   Strength: Patient will improve strength to 50% of stated goals, listed in objective measures above, in order to progress towards independence with functional activities.  PC   HEP: Patient will demonstrate independence with home exercise program in order to progress toward functional independence. PC     Long Term Goals:  12 weeks  Progress   Pain: Patient will demonstrate improved pain by reports of less than or equal to 6/10 worst pain on the verbal rating scale in order to progress toward maximal functional ability and improve quality of life.   PC   Function: Patient will demonstrate improved function as indicated by a functional limitation score of less than or equal to 48 out of 100 on FOTO. PC   Mobility: Patient will improve active range of motion to stated goals,  listed in objective measures above, in order to return to maximal functional potential and improve quality of life. PC   Strength: Patient will improve strength to stated goals, listed in objective measures above, in order to improve functional independence and quality of life. PC   Patient will return to normal ADL's, IADL's, community involvement, recreational activities, and work-related activities with less than or equal to 5/10 pain and maximal function.  PC     Goals Key:  PC= Progressing/Continue; PM= Partially Met;  GM= Goal Met,      DC= Discontinue    Plan   Certification Period/Plan of care expiration: 11/8/2023 to 02/08/2023.    Patient may begin occupational therapy after Speech Therapy or Physical Therapy is complete due to scheduling difficulties. Plan of care extended in case necessary for scheduling      Outpatient Occupational Therapy 1 -2 times weekly through 10/17/2023 to include the following interventions: Therapeutic Exercise, Functional Activities, Patient Education, Home Exercise Program, ADL Training, Transfer/Mobility Training, Manual Therapy, Neuromuscular Reeducation/Sensory, Electrical Stimulation/TENS/Interferential, Moist Heat/Ice/Paraffin, Functional Standing, Cognitive Preceptual Retraining, and Orthotic Fabrication/Fit/Management.    Ivana Gallo, OT ,OTD, OTR/L      I CERTIFY THE NEED FOR THESE SERVICES FURNISHED UNDER THIS PLAN OF TREATMENT AND WHILE UNDER MY CARE  Physician's comments:      Physician's Signature: ___________________________________________________

## 2023-11-08 NOTE — PROGRESS NOTES
See initial evaluation in plan of care.     Rabia Lane MA, CCC-SLP  Speech Language Pathologist  11/9/2023

## 2023-11-09 NOTE — PLAN OF CARE
[facility]  Speech Therapy Evaluation -Neurological Rehabilitation    Date: 11/8/2023     Name: Leslie Wood   MRN: [id number]    Therapy Diagnosis:   Encounter Diagnoses   Name Primary?    Disorientation     Aphasia     Cognitive communication disorder Yes    Physician: Marily Millan NP  Physician Orders: Ambulatory Referral to Speech Therapy   Medical Diagnosis: Disorientation [R41.0], Aphasia [R47.01]    Visit # / Visits Authorized:  1 / 1   Date of Evaluation:  11/8/2023   Insurance Authorization Period: 10/17/2023 - 10/16/2024  Plan of Care Certification:    11/8/2023 to 1/31/24      Time In: 8:36 AM   Time Out: 9:20 AM   Total time: 44 mins     Procedure   Cognitive Communication Evaluation including scoring and interpretation (Total time: 75 minutes)     Precautions: Standard, Fall, Cognition, and Communication  Subjective     History of Current Condition:  Leslie Wood is a 83 y.o. female who presents to [facility] Outpatient Speech Therapy for evaluation secondary to Disorientation [R41.0], Aphasia [R47.01]. Patient was referred to therapy by Marily Millan NP, which is the patient's neurology NP. Patient presents with her granddaughter; both provided case history as well as was taken from chart review. Of note, patient participated in outpatient speech therapy between February and June of 2023. Patient's granddaughter reports patient has sustained multiple CVAs, the most significant deficits following CVA in 2020. Patient's most recent TIA vs CVA was in February of 2023. She has a nerve stimulator and therefore cannot complete an MRI to confirm CVA or site of lesion. Patient lives with her daughter and has support from her granddaughter as well. Patient's granddaughter reports primarily L sided weakness since CVAs. They report patient's cognitive communicative status was improved during therapy, but has since regressed with increased L neglect, word finding/thought  "organization impairments, "stumbling" on words, reduced memory, getting "lost" in conversation, with increased frustration, reportedly more "emotional" and difficulty "putting things together" in conversation.         Past Medical History: Leslie Wood  has a past medical history of Arthritis, Cataract, GERD (gastroesophageal reflux disease), Heart attack (05/18/2022), Heart attack (05/23/2022), Hyperlipidemia, Hypertension, and Stroke.  Leslie Wood  has a past surgical history that includes Back surgery; Eye surgery; Appendectomy; Adrenal gland surgery; Hemorrhoid surgery; Hernia repair; Hysterectomy; Tonsillectomy; Adenoidectomy; indirect lumbar decompression; Left heart catheterization (Left, 05/20/2022); Coronary angiography (N/A, 05/20/2022); and Coronary angiography (N/A, 05/24/2022).  Medical Hx and Allergies: Leslie has a current medication list which includes the following prescription(s): aspirin, atorvastatin, duloxetine, furosemide, losartan, metoprolol succinate, nucynta, tizanidine, [DISCONTINUED] carvedilol, [DISCONTINUED] clonidine, [DISCONTINUED] diclofenac sodium, [DISCONTINUED] isosorbide mononitrate, [DISCONTINUED] metoprolol tartrate, [DISCONTINUED] nitroglycerin, and [DISCONTINUED] valsartan.   Review of patient's allergies indicates:   Allergen Reactions    Amitriptyline     Hydrochlorothiazide      Causes muscle cramping    Lisinopril      hyperkalemia    Oxycodone     Percocet [oxycodone-acetaminophen] Other (See Comments)     Seizures    Belbuca [buprenorphine hcl] Nausea And Vomiting and Other (See Comments)     Black out     Codeine Nausea Only and Rash    Prazosin Other (See Comments)     dizziness     Prior Therapy:  previous OP ST/OT/PT  Social History:  Patient lives with her daughter in [place], Patient is not currently driving  Occupation:  retired   Prior Level of Function: WFL   Current Level of Function: cognitive communication deficits impacting completion of daily " tasks and conversation  Pain Scale: 6/10 on a Visual Analog Scale currently.  Pain Location: back  Patient's Therapy Goals:  improve overall cognitive communication     Objective   Formal Assessment:    The Repeatable Battery for the Assessment of Neuropsychological Status (RBANS) Version A was administered. The RBANS is a brief, individually administered battery to measure cognitive decline or improvement. It covers five domains including immediate memory, visuospatial/constructional, language, attention, and delayed memory. The results are outlined below:    Domain Subtest Total Score Index Score Percentile Qualitative Description   Immediate Memory List Learning 5   49 <0.1 Extremely Low    Story Memory 4      Visuospatial/  Constructional Figure Copy 12   69 2 Extremely Low    Line Orientation 13      Language Picture Naming 7   57 0.2 Extremely Low    Semantic Fluency 6      Attention Digit Span 6   53 0.1 Extremely Low    Coding 7        Delayed Memory List Recall 0     44 <0.1 Extremely Low    List Recognition 10       Story Recall 0       Figure Recall 2          Total Scale   49     Percentile   <0.1     Descriptor   Extremely Low   Interpretation:  Scaled score: mean of 10, standard deviation of 3. Therefore, 16+ = Very Superior; 14-15 = Superior; 12-13 = High Average; 8-11 = Average; 6-7 = Low Average; 4-5 = Borderline; 3 and below = Extremely Low    Index score: mean of 100, standard deviation of 15. Therefore, 130+ = Very Superior; 120-129 = Superior; 110-119 = High average;  = Average; 80-89 = Low Average; 70-79 = Borderline; 69 and below = Extremely Low. Apparently the most reliable score; factor in education level.      Overall, according to the RBANS research, total Scale index is a good indicator of general cognitive functioning. The patient presents with a severe cognitive communication disorder charaterized by deficits across all domains of cognitive-communication assessed in today's  session. Primary deficits appear to be related to both auditory and visual attention which are impacting her ability to attend to, encode/process information, store and then retrieve information. This is consistent with known right hemisphere infarcts. She also is notably very distracted by internal and external distractors which negatively impacted her performance during testing. She cried multiple times throughout assessment related to performance on subtests which further negatively impacted subsequent subtests. This was demonstrated in attention, immediate recall and delayed recall scores. This was also evidenced in semantic fluency subtest as poor attention impacted thought organization for naming objects in a category. Visuospatial skills also appeared negatively impacted by attention with L inattention noted during coding subtest requiring cueing for L edge of the page. Given significant impact of attention in today's session in quiet/distraction-free environment, deficits are likely to be exacerbated in distracting environment. Deficits also likely to result in significant safety concern for execution of daily tasks accurately or efficiently.       Treatment   Total Treatment Time Separate from Evaluation: not applicable   No treatment performed secondary to time to complete evaluation.     Education provided:   -role of Speech Therapy, goals/plan of care, scheduling/cancellations, insurance limitations with patient  -Additional Education provided:   Attention Strategies    Patient and family members expressed understanding.     Home Program: to be established in initial therapy session  Assessment     Leslie presents to [facility] status post medical diagnosis of Disorientation [R41.0], Aphasia [R47.01].     Interpretation of objective assessment:   She presents with severe cognitive communication disorder charaterized by deficits across all domains of cognitive-communication assessed in  today's session. Primary deficits appear to be related to both auditory and visual attention which are impacting her ability to attend to, encode/process information, store and then retrieve information. This is consistent with known right hemisphere infarcts. She also is notably very distracted by internal and external distractors which negatively impacted her performance during testing. She cried multiple times throughout assessment related to performance on subtests which further negatively impacted subsequent subtests. This was demonstrated in attention, immediate recall and delayed recall scores. This was also evidenced in semantic fluency subtest as poor attention impacted thought organization for naming objects in a category. Visuospatial skills also appeared negatively impacted by attention with L inattention noted during coding subtest requiring cueing for L edge of the page. Given significant impact of attention in today's session in quiet/distraction-free environment, deficits are likely to be exacerbated in distracting environment. Deficits also likely to result in significant safety concern for execution of daily tasks accurately or efficiently. May complete further right hemisphere evaluation in next session as indicated.     Demonstrates impairments including limitations as described in the problem list.     Positive prognostic factors: family support  Negative prognostic factors: time since onset, age, multiple prior episodes of therapy  Barriers to therapy: No barriers to therapy identified.     Patient's spiritual, cultural, and educational needs considered and patient agreeable to plan of care and goals.    Patient will benefit from skilled therapy.    Rehab Potential: good    Short Term Goals: (6 weeks) Current Progress:   Patient will sustain attention to a simple task (auditory or visual) for 3 minutes with 90% accuracy or no more than 2 redirections.     Progressing/ Not Met 11/8/2023    Established this date   Patient will complete selective attention tasks (auditory or visual) with 90% accuracy independently increase selective attention.     Progressing/ Not Met 11/8/2023   Established this date   Patient will use attention shifting strategies to shift attention between two tasks (auditory or visual) with no more than 3 cues or 90% accuracy to improve alternating attention.     Progressing/ Not Met 11/8/2023   Established this date    Patient will recall functional information (name, birthday, address, etc) following delays of 2-3 minutes following a prompt questions across 3 therapy sessions with 80% accuracy (spaced retrieval).     Progressing/ Not Met 11/8/2023   Established this date    Patient will complete a functional task to improve attention, memory and/or executive functions (I.e. sample bill paying activity, recipe, or multiple choice comprehension questions to 1 paragraphs) with 80% accuracy and natural environment noise distractions (TV news background, music, etc.).     Progressing/ Not Met 11/8/2023   Established this date    Patient will be educated regarding cognitive deficits as applicable to the patient's life for increased awareness and will execute returned demonstration of information with 80% accuracy.      Progressing/ Not Met 11/8/2023   Established this date        Long Term Goals: (12 weeks) Current Progress:   Patient will improve attention skills to effectively attend to and communicate in simple daily living tasks in functional living environment.    Established this date         Plan     Recommended Treatment Plan:  Patient will participate in the [facility] rehabilitation program for speech therapy 2 times per week for 10 weeks to address her Cognition deficits, to educate patient and their family, and to participate in a home exercise program.    Other Recommendations:   NA    Therapist's Name:   Rabia Lane MA, CCC-SLP  Speech Language Pathologist  11/9/2023

## 2023-11-16 ENCOUNTER — CLINICAL SUPPORT (OUTPATIENT)
Dept: REHABILITATION | Facility: HOSPITAL | Age: 83
End: 2023-11-16
Payer: MEDICARE

## 2023-11-16 DIAGNOSIS — I69.30 LATE EFFECT OF CEREBROVASCULAR ACCIDENT (CVA): ICD-10-CM

## 2023-11-16 DIAGNOSIS — R26.9 GAIT DIFFICULTY: ICD-10-CM

## 2023-11-16 DIAGNOSIS — R26.89 BALANCE PROBLEM: ICD-10-CM

## 2023-11-16 DIAGNOSIS — Z74.09 IMPAIRED FUNCTIONAL MOBILITY, BALANCE, GAIT, AND ENDURANCE: Primary | ICD-10-CM

## 2023-11-16 DIAGNOSIS — I69.354 HEMIPLEGIA AND HEMIPARESIS FOLLOWING CEREBRAL INFARCTION AFFECTING LEFT NON-DOMINANT SIDE: ICD-10-CM

## 2023-11-16 PROCEDURE — 97162 PT EVAL MOD COMPLEX 30 MIN: CPT

## 2023-11-16 NOTE — PLAN OF CARE
"[facility]   Physical Therapy Initial Evaluation     Date: 11/16/2023   Name: Leslie CASTELLON [facility] Number: [id number]    Therapy Diagnosis:   Encounter Diagnoses   Name Primary?    Late effect of cerebrovascular accident (CVA)     Hemiplegia and hemiparesis following cerebral infarction affecting left non-dominant side     Gait difficulty      Physician: Marily Millan NP    Physician Orders: PT Eval and Treat   Medical Diagnosis from Referral:   Late effect of cerebrovascular accident (CVA)   Hemiplegia and hemiparesis following cerebral infarction affecting left non-dominant side   Gait difficulty   Evaluation Date: 11/16/2023  Authorization Period Expiration: 10/16/2024  Plan of Care Expiration: 2/16/2024  Progress Note Due: 12/16/2023  Visit # / Visits authorized: 1/ 1   FOTO: 1/ 3     Precautions: Standard and Fall    Time In: 1000  Time Out: 1100  Total Appointment Time (timed & untimed codes): 60 minutes    SUBJECTIVE   Date of onset: 2023    History of current condition - Leslie reports: that she experiences pain relief at the knee and lower back over the last few weeks after injection at the right knee approximately 4 weeks ago.  Was offered a total knee replacement.  Held off on that at this time.  Received a cortisone injection at the right which has helped.  Still living at home.      Falls: Has had a few falls this year, none recently    Imaging, CT scan:    "1.  Remote T10 compression fracture status post vertebroplasty with mild bony retropulsion and mild associated canal stenosis.   2.  Mild degenerative changes within the thoracic spine without significant canal stenosis.   3.  Posterior fusion spanning L3-L4. No hardware complication.   4.  Degenerative changes within the lumbar spine with moderate canal stenosis at L1-L2 and L2-L3.   5.  No acute fracture or traumatic malalignment.   6.  Osteopenia.   7.  Anticipated position of intact spinal cord stimulator leads. 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 [employer] a few times per week for outings  Prior Level of Function: Does require a bit of assistance with day to day activities, able to walk with RW  Current Level of Function: Does require a bit of assistance with day to day activities, able to walk with RW    Pain:  Current 2/10, worst 8/10, best 2/10   Location: knee and lower back pain; recent knee injection has decreased pain; still with lower back pain   Description: Aching, Dull, and Deep  Aggravating Factors: Sitting, Standing, and Walking  Easing Factors: injection - recent knee injection has decreased pain significantly    Patients goals: Decreased pain at the lower back     Medical History:   Past Medical History:   Diagnosis Date    Arthritis     Cataract     GERD (gastroesophageal reflux disease)     Heart attack 05/18/2022    Heart attack 05/23/2022    Hyperlipidemia     Hypertension     Stroke        Surgical History:   Leslie Wood  has a past surgical history that includes Back surgery; Eye surgery; Appendectomy; Adrenal gland surgery; Hemorrhoid surgery; Hernia repair; Hysterectomy; Tonsillectomy; Adenoidectomy; indirect lumbar decompression; Left heart catheterization (Left, 05/20/2022); Coronary angiography (N/A, 05/20/2022); and Coronary angiography (N/A, 05/24/2022).    Medications:   Leslie has a current medication list which includes the following prescription(s): aspirin, atorvastatin, duloxetine, furosemide, losartan, metoprolol succinate, nucynta, tizanidine, [DISCONTINUED] carvedilol, [DISCONTINUED] clonidine, [DISCONTINUED] diclofenac sodium, [DISCONTINUED] isosorbide mononitrate, [DISCONTINUED] metoprolol tartrate, [DISCONTINUED] nitroglycerin, and [DISCONTINUED] " valsartan.    Allergies:   Review of patient's allergies indicates:   Allergen Reactions    Amitriptyline     Hydrochlorothiazide      Causes muscle cramping    Lisinopril      hyperkalemia    Oxycodone     Percocet [oxycodone-acetaminophen] Other (See Comments)     Seizures    Belbuca [buprenorphine hcl] Nausea And Vomiting and Other (See Comments)     Black out     Codeine Nausea Only and Rash    Prazosin Other (See Comments)     dizziness          OBJECTIVE     CMS Impairment/Limitation/Restriction for FOTO Orthopedic Lumbar Survey    Therapist reviewed FOTO scores for Leslie Wood on 11/16/2023.   FOTO documents entered into [employer] - see Media section.            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    Posture/Structure: Patient with a kyphotic thoracic posture    Gait: Performed with RW; relatively good walking pattern and walking velocity.  Wearing a neoprene sleeve and a lateral metal stay at th right knee.    Sensation: Mild impaired sensation bilateral feet  Reflexes: NT Patellar and Achilles reflexes.    Balance: SL R=1s, L=1s,       Functional Tests  Outcome Norms   Timed Up and Go NT <13.5 sec   30 second Sit to Stand 9 Age Male Female   60-64 <14 <12   65-69 <12 <11   70-74 <12 <10   75-79 <11 <10   80-84 <10 <9   85-89 <8 <8   90-93 <7 <4      Five Time Sit to Stand NT 60s: <11.4 sec  70s: <12.6 sec  80s: 14.8 sec   6 minutes walk NT 60s: >538f, 572m  70s: >471f, 527m  80s: >417f, 392m        AROM:    Thoracolumbar  (single inclinometer at L4/5) AROM  (degrees) Pain/Dysfunction with Movement   Flexion 68    Extension 20    Right side bending 34    Left side bending 20 Pain left side sacral   Right rotation 75%    Left rotation 50% Pain left side sacral       Hip Right Left Pain/Dysfunction with Movement   Hip flex 113 112    Hip Abd 26 28    Hip ER (sitting) 52 48     Hip IR (sitting) 26 12    Hip Ext (sidelying) +3 +4    Knee flex 112 136    Knee extn -10 +2      Strength:     L/E MMT Right Left Pain/Dysfunction with Movement   Hip Flexion 4/5 4/5    Hip Extension 4-/5 3+/5    Hip Abd 3+/5 4-/5    Hip Add 4-/5 4+/5    Hip IR 3+/5 4/5    Hip ER 4/5 4/5    Knee Flexion 4+/5 4+/5    Knee Extension 5/5 5/5    Ankle DF 5/5 5/5    Ankle PF NT NT    Big Toe Extension NT NT    ASLR 4-/5 4/5            PIVM Lumbar/Thoracic: Hypomobility and pain at L3-L4, L4-L5, and L5-S1   Neural Tension Test: NT      Top Tier   Multi-Segmental Flexion Functional - Nonpainful   Multi-Segmental Extension Dysfunctional - Nonpainful   Multi-Segmental Rotation Dysfunctional - Nonpainful   Single Leg Stance Dysfunctional - Nonpainful   Arms Down Deep Squat NT       Palpation: Tenderness on palpation of sacral multifidus musculature.        TREATMENT     Total Treatment time (time-based codes) separate from Evaluation: 5 minutes      Leslie received the treatments listed below:      THERAPEUTIC EXERCISES to develop strength, endurance, ROM, flexibility, posture, and core stabilization for (5) minutes including:    Intervention Performed Today    Nustep     Lower trunk rots     Supine  Pullover Glut/Piriformis   Supine  Ant/post pelvic tilts  Abdominal bracing with knee to chest   Sitting   Clam shells  Quad sets        FDN and STM at the lumbosacral multifidus            Plan for Next Visit:         PATIENT EDUCATION AND HOME EXERCISES     Education provided:   - Strength and ROM assistance    Written Home Exercises Provided: yes. Exercises were reviewed and Leslie was able to demonstrate them prior to the end of the session.  Leslie demonstrated good  understanding of the education provided. See EMR under Patient Instructions for exercises provided during therapy sessions.    ASSESSMENT     Leslie is a 83 y.o. female referred to outpatient Physical Therapy with a medical diagnosis of   Late effect of  cerebrovascular accident (CVA)   Hemiplegia and hemiparesis following cerebral infarction affecting left non-dominant side   Gait difficulty   The patient presents with signs and symptoms consistent with diagnosis along with pain at the lumbosacral region, decreased ROM lumbar spine, decreased ROM bilateral hip into extension and ER, decreased right knee ROM into extension, significant weakness core and lower extremities musculature, balance deficits, and deficit with squat related activities.  Cognition appears significantly improved compared to a few months past.  Spoke with family members on the importance of continuing to bring the patient to work and the gym routinely during the week.    The patient has also seen Dr. Ibarra.  He has stated that there is a possibility of performing a total knee replacement.    Pt prognosis is Fair, , if patient is consistent and compliant with PT and home exercise program.   Pt will benefit from skilled outpatient Physical Therapy to address the deficits stated above and in the chart below, provide pt/family education, and to maximize pt's level of independence.     Plan of care discussed with patient: Yes  Pt's spiritual, cultural and educational needs considered and patient is agreeable to the plan of care and goals as stated below:     Anticipated Barriers for therapy: Anxiety or Panic Disorders, Arthritis, Back pain, Depression, Headaches, High  Blood Pressure, Kidney, Bladder, Prostate or Urination Problems, Osteoporosis, Prior Surgery, Stroke or TIA, Visual Impairment    Medical Necessity is demonstrated by the following  History  Co-morbidities and personal factors that may impact the plan of care Co-morbidities:   Anxiety or Panic Disorders, Arthritis, Back pain, Depression, Headaches, High  Blood Pressure, Kidney, Bladder, Prostate or Urination Problems, Osteoporosis, Prior Surgery, Stroke or TIA, Visual Impairment    Personal Factors:   no deficits     high    Examination  Body Structures and Functions, activity limitations and participation restrictions that may impact the plan of care Body Regions:   back  lower extremities    Body Systems:    ROM  strength  gait  motor control  joint mobility, muscle tone, muscle length    Participation Restrictions:   See current level of function listed above     Activity limitations:   Learning and applying knowledge  no deficits    General Tasks and Commands  no deficits    Communication  no deficits    Mobility  lifting and carrying objects  walking    Self care  looking after one's health    Domestic Life  shopping  cooking  doing house work (cleaning house, washing dishes, laundry)    Interactions/Relationships  no deficits    Life Areas  employment    Community and Social Life  community life  recreation and leisure         moderate   Clinical Presentation evolving clinical presentation with changing clinical characteristics moderate   Decision Making/ Complexity Score: moderate     Goals: Reviewed:11/16/2023    Short Term Goals: In 4 weeks   1.Patient to be educated on HEP.  2. Patient  to increase lumbar spine ROM to B SB 30 deg, B Rot 75%  3. Patient  to increase hip PROM to 60 deg ER bilaterally, IR 20 deg bilaterally, in order to assist with more normalized gait pattern.  4. Patient  to increase B LE and core strength to 4-/5 in order to improve gait and balance.   5. Patient to increased right knee extn ROM to -5 deg for improved gait mechanics  6. Patient   to have pain less than 2/10 at worst, to improve QOL.  7. Patient  to improve score on the FOTO, to improve QOL.  8. Patient  to be educated on postural/body mechanics awareness.    Long Term Goals: In 8 weeks  1.Patient to improve score on the FOTO to 48 or better, to improve QOL.  2. Patient to demo increase in LE strength to 4/5, in order to improve endurance and increase ability to ambulate for increased time.  3. Patient to have decreased pain to 2/10 at worst,  to improve QOL.  4. Patient to demo increase lumbar ROM to ,  SB 30 deg, B Rot 90%in order to improve available range of motion for ADL's.  .  5. Patient  to increase hip PROM to Extn +5 deg,  60 deg ER bilaterally, IR 20 deg bilaterally, in order to assist with more normalized gait pattern.  6. Patient to increased right knee extn ROM to -5 deg for improved gait mechanics  6. Patient to perform daily activities without increased symptoms including:  Prolonged walking x 10 minutes.  Ascending.descending 6 inch step without upper extremities assistance  Return to gym and work outings 2 times per week with family transportation        PLAN     Plan of care Certification: 11/16/2023 to 2/16/2024.    Outpatient Physical Therapy 2 times weekly for 8 weeks to include the following interventions: Gait Training, Manual Therapy, Moist Heat/ Ice, Neuromuscular Re-ed, Patient Education, Self Care, Therapeutic Activities, and Therapeutic Exercise.     Leoncio Lujan, PT      I CERTIFY THE NEED FOR THESE SERVICES FURNISHED UNDER THIS PLAN OF TREATMENT AND WHILE UNDER MY CARE   Physician's comments:     Physician's Signature: ___________________________________________________

## 2023-11-18 PROBLEM — R26.89 BALANCE PROBLEM: Status: ACTIVE | Noted: 2023-11-18

## 2023-11-18 PROBLEM — M54.50 LUMBOSACRAL PAIN: Status: ACTIVE | Noted: 2019-07-22

## 2023-11-24 ENCOUNTER — CLINICAL SUPPORT (OUTPATIENT)
Dept: REHABILITATION | Facility: HOSPITAL | Age: 83
End: 2023-11-24
Payer: MEDICARE

## 2023-11-24 DIAGNOSIS — R29.898 DECREASED GRIP STRENGTH OF LEFT HAND: ICD-10-CM

## 2023-11-24 DIAGNOSIS — M62.81 DECREASED MUSCLE STRENGTH: ICD-10-CM

## 2023-11-24 DIAGNOSIS — I69.30 LATE EFFECT OF CEREBROVASCULAR ACCIDENT (CVA): Primary | ICD-10-CM

## 2023-11-24 DIAGNOSIS — R41.841 COGNITIVE COMMUNICATION DISORDER: Primary | ICD-10-CM

## 2023-11-24 DIAGNOSIS — Z78.9 DECREASED ACTIVITIES OF DAILY LIVING (ADL): ICD-10-CM

## 2023-11-24 DIAGNOSIS — R29.898 FINE MOTOR IMPAIRMENT: ICD-10-CM

## 2023-11-24 DIAGNOSIS — R29.818 FINE MOTOR IMPAIRMENT: ICD-10-CM

## 2023-11-24 DIAGNOSIS — I69.354 HEMIPLEGIA AND HEMIPARESIS FOLLOWING CEREBRAL INFARCTION AFFECTING LEFT NON-DOMINANT SIDE: ICD-10-CM

## 2023-11-24 PROCEDURE — 97129 THER IVNTJ 1ST 15 MIN: CPT | Performed by: SPEECH-LANGUAGE PATHOLOGIST

## 2023-11-24 PROCEDURE — 97530 THERAPEUTIC ACTIVITIES: CPT

## 2023-11-24 PROCEDURE — 97110 THERAPEUTIC EXERCISES: CPT

## 2023-11-24 PROCEDURE — 97130 THER IVNTJ EA ADDL 15 MIN: CPT | Performed by: SPEECH-LANGUAGE PATHOLOGIST

## 2023-11-24 PROCEDURE — 97112 NEUROMUSCULAR REEDUCATION: CPT

## 2023-11-24 NOTE — PROGRESS NOTES
[facility]  Speech Therapy Treatment Note- Neurological Rehabilitation  Date: 11/24/2023     Name: Leslie Wood   MRN: [id number]   Therapy Diagnosis:   Encounter Diagnosis   Name Primary?    Cognitive communication disorder Yes   Physician: Marily Millan NP  Physician Orders: Ambulatory Referral to Speech Therapy   Medical Diagnosis: Disorientation [R41.0], Aphasia [R47.01]     Visit #/ Visits Authorized: 1/ 16 (+eval)  Date of Evaluation:  11/8/23  Insurance Authorization Period: 11/15/2023 - 12/31/2023   Plan of Care Expiration Date:    1/31/24  Extended Plan of Care:  -   Progress Note: 12/8/23     Time In:  10:45 AM  Time Out:  11:26 AM  Total Billable Time: 41 mins      Precautions: Standard, Fall, Cognition, and Communication  Subjective:   Patient reports: she had a happy Thanksgiving with her family. She worked with occupational therapy prior to today's session. She reports significant pain in her back and leg today.    She was compliant to home exercise program.     Response to previous treatment: first treatment session    Pain Scale: 9/10 on a Visual Analog Scale currently.  Pain Location: back and leg  Objective:   TIMED  Procedure Min.   Cognitive Therapeutic Interventions, first 15 minutes CPT 01077  15   Cognitive Therapeutic Interventions, each additional 15 minutes CPT 29867  26 mins            Short Term Goals: (6 weeks) Current Progress:   Patient will sustain attention to a simple task (auditory or visual) for 3 minutes with 90% accuracy or no more than 2 redirections.      Progressing/ Not Met 11/24/2023  Not formally addressed   2. Patient will complete selective attention tasks (auditory or visual) with 90% accuracy independently increase selective attention.      Progressing/ Not Met 11/24/2023  Not formally addressed   3. Patient will use attention shifting strategies to shift attention between two tasks (auditory or visual) with no more than 3 cues or 90% accuracy to  improve alternating attention.      Progressing/ Not Met 11/24/2023  Not formally addressed    4. Patient will recall functional information (name, birthday, address, etc) following delays of 2-3 minutes following a prompt questions across 3 therapy sessions with 80% accuracy (spaced retrieval).      Progressing/ Not Met 11/24/2023   Not formally addressed     5. Patient will complete a functional task to improve attention, memory and/or executive functions (I.e. sample bill paying activity, recipe, or multiple choice comprehension questions to 1 paragraphs) with 80% accuracy and natural environment noise distractions (TV news background, music, etc.).      Progressing/ Not Met 11/24/2023    Not formally addressed     6. Patient will be educated regarding cognitive deficits as applicable to the patient's life for increased awareness and will execute returned demonstration of information with 80% accuracy.       Progressing/ Not Met 11/24/2023  Not formally addressed     7. Patient will participate in continued cognitive assessment of right hemisphere dysfunction.     Goal Met 11/24/2023   See results of RICE below.        [facility] ([facility]) Evaluation of Communication Problems in Right Hemisphere Dysfunction-3rd Edition (RICE-3)  The RICE-3 includes 5 subtests evaluating right hemisphere cognitive-communication deficits that have clinical relevance to rehabilitation including behavior observation profile, pragmatic skills, narrative discourse, visual scanning and tracking, assessment of writing, and metaphorical language. The Administration Manual details administration, scoring and interpretation of results and provides severity ratings for each subtest.     Subtest Score Severity   Behavioral Observational Profile 15/24 Moderate   Rating Scale of Pragmatic Communication Skills 31/40 Mild   Visual Scanning and Tracking-Accuracy 21 errors Moderate    Visual Scanning and Tracking-Rate 469  seconds Moderate   Assessment and Analysis of Writing   Moderate    Metaphorical Language Test   Severe      Patient's performance on the RICE-3 was significant for, as previously documented, deficits in both auditory and visual attention impacting patient's ability to encode and then retrieve information at immediate and delayed intervals as well as for executive functioning. Patient was overall usually attentive for conversation and demonstrated awareness of some major limitations but not all. She was able to recognize some less familiar people but not all, and was oriented to overall time periods/seasons but not specific date or month. Pragmatically, patient demonstrated generally appropriate intonation, affect, eye contact, conversation initiation and turn taking; she had some difficulty with maintaining topic, with inconsistently verbose and tangential responses in conversation. She demonstrated inefficient visual scanning pattern which resulted in errors (missed and non-target items selected) along with increased time to complete. Of note, when she impulsively completed 1 out of 4 tracking subtests, accuracy was significantly decreased. Writing was free from visuospatial disorganization or left inattention; however, patient did demonstrated superimposed letters, omission of letters and she could not write a sample of at least 50 words for spontaneous writing sample. Additionally, she demonstrated literal and personal interpretation with overall incomplete responses for metaphorical language interpretation subtest.     Patient Education/Response:   Patient educated regarding the followin. Cognitive triangle as it results to assessment findings/impact on functioning   2. Impact of specific right hemisphere dysfunction on reading, inattention/left neglect, ability to complete daily tasks     Home program established: Patient instructed to continue prior program  Patient verbalized understanding to  all above education provided.     See Electronic Medical Record under Patient Instructions for exercises provided throughout therapy.  Assessment:   RICE was administered in today's session to determine right-hemisphere specific goals. See impressions above. Relevant goals added. Will plan to begin to address attention goals, beginning with sustained attention in next session. Patient expressed understanding of information provided.     Leslie is progressing well towards her goals. Current goals remain appropriate. Goals to be updated as necessary.     Patient prognosis is Fair. Patient will continue to benefit from skilled outpatient speech and language therapy to address the deficits listed in the problem list on initial evaluation, provide patient/family education and to maximize patient's level of independence in the home and community environment.     Medical necessity is demonstrated by the following IMPAIRMENTS:  Cognition: Deficits in executive functioning, attention, and memory prevent the pt from relaying medically and safety relevant information in a timely manner in a state of emergency.     Barriers to Therapy: NA  Patient's spiritual, cultural and educational needs considered and patient agreeable to plan of care and goals.  Plan:   Continue Plan of Care with focus on rehabilitation and compensation for attention, memory, and executive functioning deficits impacting safety and efficiency of daily task completion.     Rabia Lane MA, CCC-SLP  Speech Language Pathologist  11/24/2023

## 2023-11-24 NOTE — PROGRESS NOTES
[facility]  Occupational Therapy Treatment Note     Date: 11/24/2023  Name: Leslie CASTELLON [facility] Number: [id number]    Therapy Diagnosis:     Encounter Diagnoses   Name Primary?    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     Decreased  strength of left hand     Decreased muscle strength     Decreased activities of daily living (ADL)     Fine motor impairment          Physician: Marily Millan NP    Physician Orders: Occupational Therapy to Evaluate and Treat   Medical Diagnosis: R29.898 (ICD-10-CM) - Poor fine motor skills  Surgical Procedure and Date: N/A     Evaluation Date: 11/8/2023  Insurance Authorization Period Expiration: 11/8/2023 - 12/31/2023  Plan of Care Certification Period: 11/8/2023 - 02/08/2024  Progress Note Due: 12/8/2023     Date of Return to MD: N/A     Visit # / Visits authorized: 1/25  FOTO: 1/3     Precautions:  Standard    Time In: 10:00  Time Out: 10:45  Total Treatment Time: 45 minutes  Total Billable Time: 45 minutes    Subjective     Patient reports: She feels weak today    she was compliant with home exercise program given last session.   Response to previous treatment: Tolerated well  Functional change: Improved knowledge of HEP    Pain: 6/10  Location: bilateral hands     Objective     Please see progress note dated 8/11/2023 for updated objective measures    Treatment     Supervised Modalities: Modalities such as hot/cold packs, traction, unattended electrical stimulation, paraffin bath, fluidotherapy to reduce pain and increase soft tissue extensibility. Leslie received (0) minutes of modalities listed below after being cleared for contraindications.  x = modalities performed     Supervised Modalities 11/24/2023                            Manual Therapy Techniques: Joint mobilizations, Soft tissue Mobilization, and mobilization with movement applied to the  area listed below. Leslie received (0) minutes of interventions. x = intervention performed today    Manual Intervention 11/24/2023    Soft Tissue Mobilization     Joint Mobilizations     Mobilization with movement              Therapeutic Exercises: to develop strength, endurance, ROM, flexibility, posture and core stabilization. Leslie received (15) minutes of exercises listed below. x = performed today * = level of progression of activity     Therapeutic Exercise 11/24/2023    Interossei  -green foam  -rubber bands x 20x bilateral    Digi flexion - red x 20x bilateral    Resistive pinch - red  -2pt  -3pt  -lateral x 20x bilateral    Thumb palmar   -adduction  -abduction x 20x bilateral    Thumb radial  -adduction  -abduction x 20x bilateral    Forearm exercise  - wrist flexion  -wrist extension  - supination  -pronation x 2lb        Therapeutic Activities: Everyday tasks to address the combined need of improved strength, range of motion, and endurance to achieve increased independence. While simulating these activities, the person is applying the gained skills of strength and improved range of motion in a practical way.  Leslie received (15) minutes of activities listed below. x = activities performed today * = level of progression of activity     Therapeutic Activities 11/24/2023    Velcro board  -lateral pinch  - dowel  x 20x bilateral    Opening and closing containers x 5 minutes, ergonomics               Neuromuscular Re-education: the use of functional strengthening, stretching, balancing and coordination activities that train the nerves and muscles to react and communicate to restore normal body movement patterns to increase self care independence. Leslie received (15) minutes of interventions listed below.  x = interventions performed today * = level of progression of activity     Neuromuscular Re-education 11/24/2023    Finger opposition  x 20x   Finger taps (extension) x 20x   Finger opposition with slides x     Table Top active assist range of motion  - shoulder flexion  - shoulder abduction  x 5x - 15 second holds     Self Care: Fundamental skills required to independently care for oneself. Activities of daily living such as eating, bathing, dressing, toileting, grooming, sleeping, transfers and mobility. Instrumental activities of daily living such as driving, medication management, pet care, home management/cleaning, financial management, using the phone, meal preparation and shopping. Leslie received (0) minutes of interventions listed below.  x = interventions performed * = level of progression of activity     Self Care 11/24/2023                            Home Exercises and Education Provided     Education provided:   - home exercise program education provided  - progress towards goals     Written Home Exercises Provided: Patient instructed to cont prior HEP.  Exercises were reviewed and Leslie was able to demonstrate them prior to the end of the session. Leslie demonstrated good understanding of the home exercise program provided.     See EMR under Patient Instructions for exercises provided prior visit.        Assessment     Patient tolerated exercises well. Needs verbal cues for hand placement.     Leslie is progressing towards her goals and there are no updates to goals at this time. Patient prognosis is Good.     Patient will continue to benefit from skilled outpatient occupational therapy to address the deficits listed in the problem list on initial evaluation provide patient/family education and to maximize patient's level of independence in the home and community environment.     Patient's spiritual, cultural and educational needs considered and patient agreeable to plan of care and goals.    Anticipated barriers to occupational therapy: Cognition, home exercise program compliance    Goals:     Short Term Goals:  6 weeks  Progress    Pain: Patient will demonstrate improved pain by reports of less than or equal to  7/10 worst pain on the verbal rating scale in order to progress toward maximal functional ability and improve quality of life. PC   Function: Patient will demonstrate improved function as indicated by a functional limitation score of less than or equal to 46 out of 100 on FOTO. PC   Mobility: Patient will improve active range of motion to 50% of stated goals, listed in objective measures above, in order to progress towards independence with functional activities.  PC   Strength: Patient will improve strength to 50% of stated goals, listed in objective measures above, in order to progress towards independence with functional activities.  PC   HEP: Patient will demonstrate independence with home exercise program in order to progress toward functional independence. PC      Long Term Goals:  12 weeks  Progress   Pain: Patient will demonstrate improved pain by reports of less than or equal to 6/10 worst pain on the verbal rating scale in order to progress toward maximal functional ability and improve quality of life.   PC   Function: Patient will demonstrate improved function as indicated by a functional limitation score of less than or equal to 48 out of 100 on FOTO. PC   Mobility: Patient will improve active range of motion to stated goals, listed in objective measures above, in order to return to maximal functional potential and improve quality of life. PC   Strength: Patient will improve strength to stated goals, listed in objective measures above, in order to improve functional independence and quality of life. PC   Patient will return to normal ADL's, IADL's, community involvement, recreational activities, and work-related activities with less than or equal to 5/10 pain and maximal function.  PC      Goals Key:  PC= Progressing/Continue;       PM= Partially Met;       GM= Goal Met,      DC= Discontinue    Plan     Continue Plan of Care (POC) and progress per patient tolerance.      Ivana Gallo, OT  ,OTD, OTR/L

## 2023-11-29 ENCOUNTER — CLINICAL SUPPORT (OUTPATIENT)
Dept: REHABILITATION | Facility: HOSPITAL | Age: 83
End: 2023-11-29
Payer: MEDICARE

## 2023-11-29 DIAGNOSIS — M62.81 DECREASED MUSCLE STRENGTH: ICD-10-CM

## 2023-11-29 DIAGNOSIS — I69.30 LATE EFFECT OF CEREBROVASCULAR ACCIDENT (CVA): Primary | ICD-10-CM

## 2023-11-29 DIAGNOSIS — I69.354 HEMIPLEGIA AND HEMIPARESIS FOLLOWING CEREBRAL INFARCTION AFFECTING LEFT NON-DOMINANT SIDE: ICD-10-CM

## 2023-11-29 DIAGNOSIS — R29.818 FINE MOTOR IMPAIRMENT: ICD-10-CM

## 2023-11-29 DIAGNOSIS — R29.898 FINE MOTOR IMPAIRMENT: ICD-10-CM

## 2023-11-29 DIAGNOSIS — R41.841 COGNITIVE COMMUNICATION DISORDER: Primary | ICD-10-CM

## 2023-11-29 DIAGNOSIS — Z78.9 DECREASED ACTIVITIES OF DAILY LIVING (ADL): ICD-10-CM

## 2023-11-29 DIAGNOSIS — R29.898 DECREASED GRIP STRENGTH OF LEFT HAND: ICD-10-CM

## 2023-11-29 PROCEDURE — 97112 NEUROMUSCULAR REEDUCATION: CPT

## 2023-11-29 PROCEDURE — 97110 THERAPEUTIC EXERCISES: CPT

## 2023-11-29 PROCEDURE — 97530 THERAPEUTIC ACTIVITIES: CPT

## 2023-11-29 PROCEDURE — 97129 THER IVNTJ 1ST 15 MIN: CPT | Performed by: SPEECH-LANGUAGE PATHOLOGIST

## 2023-11-29 PROCEDURE — 97130 THER IVNTJ EA ADDL 15 MIN: CPT | Mod: KX | Performed by: SPEECH-LANGUAGE PATHOLOGIST

## 2023-11-29 NOTE — PROGRESS NOTES
[facility]  Occupational Therapy Treatment Note     Date: 11/29/2023  Name: Leslie CASTELLON [facility] Number: [id number]    Therapy Diagnosis:     Encounter Diagnoses   Name Primary?    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     Decreased  strength of left hand     Decreased muscle strength     Decreased activities of daily living (ADL)     Fine motor impairment          Physician: Marily Millan NP    Physician Orders: Occupational Therapy to Evaluate and Treat   Medical Diagnosis: R29.898 (ICD-10-CM) - Poor fine motor skills  Surgical Procedure and Date: N/A     Evaluation Date: 11/8/2023  Insurance Authorization Period Expiration: 11/8/2023 - 12/31/2023  Plan of Care Certification Period: 11/8/2023 - 02/08/2024  Progress Note Due: 12/8/2023     Date of Return to MD: N/A     Visit # / Visits authorized: 2/25  FOTO: 1/3     Precautions:  Standard    Time In: 2:00  Time Out: 2:45  Total Treatment Time: 45 minutes  Total Billable Time: 45 minutes    Subjective     Patient reports: She feels weak today. Her knee is hurting her    she was compliant with home exercise program given last session.   Response to previous treatment: Tolerated well  Functional change: Improved knowledge of HEP    Pain: 6/10  Location: bilateral hands     Objective     Please see progress note dated 8/11/2023 for updated objective measures    Treatment     Supervised Modalities: Modalities such as hot/cold packs, traction, unattended electrical stimulation, paraffin bath, fluidotherapy to reduce pain and increase soft tissue extensibility. Leslie received (0) minutes of modalities listed below after being cleared for contraindications.  x = modalities performed     Supervised Modalities 11/29/2023                            Manual Therapy Techniques: Joint mobilizations, Soft tissue Mobilization, and mobilization with  movement applied to the area listed below. Leslie received (0) minutes of interventions. x = intervention performed today    Manual Intervention 11/29/2023    Soft Tissue Mobilization     Joint Mobilizations     Mobilization with movement              Therapeutic Exercises: to develop strength, endurance, ROM, flexibility, posture and core stabilization. Leslie received (15) minutes of exercises listed below. x = performed today * = level of progression of activity     Therapeutic Exercise 11/29/2023    Interossei  -green foam  -rubber bands x 20x bilateral    Digi flexion - red x 20x bilateral    Resistive pinch - red  -2pt  -3pt  -lateral x 20x bilateral    Thumb palmar   -adduction  -abduction x 20x bilateral    Thumb radial  -adduction  -abduction x 20x bilateral    Forearm exercise  - wrist flexion  -wrist extension  - supination  -pronation x 2lb        Therapeutic Activities: Everyday tasks to address the combined need of improved strength, range of motion, and endurance to achieve increased independence. While simulating these activities, the person is applying the gained skills of strength and improved range of motion in a practical way.  Leslie received (15) minutes of activities listed below. x = activities performed today * = level of progression of activity     Therapeutic Activities 11/29/2023    Velcro board  -lateral pinch  - dowel  x 20x bilateral    Opening and closing containers x 5 minutes, ergonomics               Neuromuscular Re-education: the use of functional strengthening, stretching, balancing and coordination activities that train the nerves and muscles to react and communicate to restore normal body movement patterns to increase self care independence. Leslie received (15) minutes of interventions listed below.  x = interventions performed today * = level of progression of activity     Neuromuscular Re-education 11/29/2023    Finger opposition  x 20x   Finger taps (extension) x 20x   Finger  opposition with slides x    Table Top active assist range of motion  - shoulder flexion  - shoulder abduction  x 5x - 15 second holds     Self Care: Fundamental skills required to independently care for oneself. Activities of daily living such as eating, bathing, dressing, toileting, grooming, sleeping, transfers and mobility. Instrumental activities of daily living such as driving, medication management, pet care, home management/cleaning, financial management, using the phone, meal preparation and shopping. Leslie received (0) minutes of interventions listed below.  x = interventions performed * = level of progression of activity     Self Care 11/29/2023                            Home Exercises and Education Provided     Education provided:   - home exercise program education provided  - progress towards goals     Written Home Exercises Provided: Patient instructed to cont prior HEP.  Exercises were reviewed and Leslie was able to demonstrate them prior to the end of the session. Leslie demonstrated good understanding of the home exercise program provided.     See EMR under Patient Instructions for exercises provided prior visit.        Assessment     Patient tolerated exercises well. Needs verbal cues for hand placement.     Leslie is progressing towards her goals and there are no updates to goals at this time. Patient prognosis is Good.     Patient will continue to benefit from skilled outpatient occupational therapy to address the deficits listed in the problem list on initial evaluation provide patient/family education and to maximize patient's level of independence in the home and community environment.     Patient's spiritual, cultural and educational needs considered and patient agreeable to plan of care and goals.    Anticipated barriers to occupational therapy: Cognition, home exercise program compliance    Goals:     Short Term Goals:  6 weeks  Progress    Pain: Patient will demonstrate improved pain by reports  of less than or equal to 7/10 worst pain on the verbal rating scale in order to progress toward maximal functional ability and improve quality of life. PC   Function: Patient will demonstrate improved function as indicated by a functional limitation score of less than or equal to 46 out of 100 on FOTO. PC   Mobility: Patient will improve active range of motion to 50% of stated goals, listed in objective measures above, in order to progress towards independence with functional activities.  PC   Strength: Patient will improve strength to 50% of stated goals, listed in objective measures above, in order to progress towards independence with functional activities.  PC   HEP: Patient will demonstrate independence with home exercise program in order to progress toward functional independence. PC      Long Term Goals:  12 weeks  Progress   Pain: Patient will demonstrate improved pain by reports of less than or equal to 6/10 worst pain on the verbal rating scale in order to progress toward maximal functional ability and improve quality of life.   PC   Function: Patient will demonstrate improved function as indicated by a functional limitation score of less than or equal to 48 out of 100 on FOTO. PC   Mobility: Patient will improve active range of motion to stated goals, listed in objective measures above, in order to return to maximal functional potential and improve quality of life. PC   Strength: Patient will improve strength to stated goals, listed in objective measures above, in order to improve functional independence and quality of life. PC   Patient will return to normal ADL's, IADL's, community involvement, recreational activities, and work-related activities with less than or equal to 5/10 pain and maximal function.  PC      Goals Key:  PC= Progressing/Continue;       PM= Partially Met;       GM= Goal Met,      DC= Discontinue    Plan     Continue Plan of Care (POC) and progress per patient tolerance.      Ivana  Cleo, OT  ,OTD, OTR/L

## 2023-11-29 NOTE — PROGRESS NOTES
"[facility]  Speech Therapy Treatment Note- Neurological Rehabilitation  Date: 11/29/2023     Name: Leslie Wood   MRN: [id number]   Therapy Diagnosis:   Encounter Diagnosis   Name Primary?    Cognitive communication disorder Yes   Physician: Marily Millan NP  Physician Orders: Ambulatory Referral to Speech Therapy   Medical Diagnosis: Disorientation [R41.0], Aphasia [R47.01]     Visit #/ Visits Authorized: 2/ 16 (+eval)  Date of Evaluation:  11/8/23  Insurance Authorization Period: 11/15/2023 - 12/31/2023   Plan of Care Expiration Date:    1/31/24  Extended Plan of Care:  -   Progress Note: 12/8/23     Time In:  1:17 PM  Time Out:  1:55 PM  Total Billable Time: 38 mins      Precautions: Standard, Fall, Cognition, and Communication  Subjective:   Patient reports: she is having a lot of issues/pain with her knee this week and reports it has been "slipping in and out of place".   She was compliant to home exercise program.     Response to previous treatment: first treatment session    Pain Scale: 9/10 on a Visual Analog Scale currently.  Pain Location: back and leg  Objective:   TIMED  Procedure Min.   Cognitive Therapeutic Interventions, first 15 minutes CPT 22700  15   Cognitive Therapeutic Interventions, each additional 15 minutes CPT 74049  23           Short Term Goals: (6 weeks) Current Progress:   Patient will sustain attention to a simple task (auditory or visual) for 3 minutes with 90% accuracy or no more than 2 redirections.      Progressing/ Not Met 11/29/2023   Auditory:  Auditory Sustained Attention:  Several sustained auditory attention tasks completed in today's session. Patient initially listened to long list of "lottery ticket" numbers played on the radio, listening for specific letter and number combinations. She initially wrote all letter/number combinations down, indicating reduced attention to task directions despite written reminders of number to listen for at the top of the " "page. On second attempt, she wrote down the correct numbers (I.e., "__[id number]") but did not write down prior mentioned letters as the task specified. Task was discontinued.     On second task, patient listened to voicemails in quiet environment, requesting repetition of voicemail as needed and then answered questions about what she heard. She required minimal-moderate cueing for use of strategy and answered questions with 64% accuracy. Will continue to progress per patient tolerance in upcoming sessions.      2. Patient will complete selective attention tasks (auditory or visual) with 90% accuracy independently increase selective attention.      Progressing/ Not Met 11/29/2023  Not formally addressed   3. Patient will use attention shifting strategies to shift attention between two tasks (auditory or visual) with no more than 3 cues or 90% accuracy to improve alternating attention.      Progressing/ Not Met 11/29/2023  Not formally addressed    4. Patient will recall functional information (name, birthday, address, etc) following delays of 2-3 minutes following a prompt questions across 3 therapy sessions with 80% accuracy (spaced retrieval).      Progressing/ Not Met 11/29/2023   Not formally addressed     5. Patient will complete a functional task to improve attention, memory and/or executive functions (I.e. sample bill paying activity, recipe, or multiple choice comprehension questions to 1 paragraphs) with 80% accuracy and natural environment noise distractions (TV news background, music, etc.).      Progressing/ Not Met 11/29/2023    Not formally addressed     6. Patient will be educated regarding cognitive deficits as applicable to the patient's life for increased awareness and will execute returned demonstration of information with 80% accuracy.       Progressing/ Not Met 11/29/2023  Not formally addressed     7. Patient will participate in continued cognitive assessment of right hemisphere dysfunction.     Goal " Met 2023   See results of RICE below.      Patient Education/Response:   Patient educated regarding the followin. Cognitive triangle as it results to assessment findings/impact on functioning   2. Impact of specific right hemisphere dysfunction on reading, inattention/left neglect, ability to complete daily tasks     Home program established: Patient instructed to continue prior program  Patient verbalized understanding to all above education provided.     See Electronic Medical Record under Patient Instructions for exercises provided throughout therapy.  Assessment:   Patient participated in auditory sustained attention tasks in today's session. She had difficulty with working memory portion of first task resulting in 0% accurate completion of task. Improved accuracy given more consistent use of strategies on second auditory task. Will continue to progress per patient tolerance, increasing complexity of attention tasks as able.     Leslie is progressing well towards her goals. Current goals remain appropriate. Goals to be updated as necessary.     Patient prognosis is Fair. Patient will continue to benefit from skilled outpatient speech and language therapy to address the deficits listed in the problem list on initial evaluation, provide patient/family education and to maximize patient's level of independence in the home and community environment.     Medical necessity is demonstrated by the following IMPAIRMENTS:  Cognition: Deficits in executive functioning, attention, and memory prevent the pt from relaying medically and safety relevant information in a timely manner in a state of emergency.     Barriers to Therapy: NA  Patient's spiritual, cultural and educational needs considered and patient agreeable to plan of care and goals.  Plan:   Continue Plan of Care with focus on rehabilitation and compensation for attention, memory, and executive functioning deficits impacting safety and efficiency of daily  task completion.     Rabia Lane MA, CCC-SLP  Speech Language Pathologist  11/29/2023

## 2023-11-30 ENCOUNTER — EXTERNAL CHRONIC CARE MANAGEMENT (OUTPATIENT)
Dept: PRIMARY CARE CLINIC | Facility: CLINIC | Age: 83
End: 2023-11-30
Payer: MEDICARE

## 2023-11-30 PROCEDURE — 99490 CHRNC CARE MGMT STAFF 1ST 20: CPT | Mod: PBBFAC | Performed by: FAMILY MEDICINE

## 2023-11-30 PROCEDURE — 99490 CHRNC CARE MGMT STAFF 1ST 20: CPT | Mod: S$PBB,,, | Performed by: FAMILY MEDICINE

## 2023-11-30 PROCEDURE — 99490 PR CHRONIC CARE MGMT, 1ST 20 MIN: ICD-10-PCS | Mod: S$PBB,,, | Performed by: FAMILY MEDICINE

## 2023-12-06 ENCOUNTER — CLINICAL SUPPORT (OUTPATIENT)
Dept: REHABILITATION | Facility: HOSPITAL | Age: 83
End: 2023-12-06
Payer: MEDICARE

## 2023-12-06 ENCOUNTER — TELEPHONE (OUTPATIENT)
Dept: INTERNAL MEDICINE | Facility: CLINIC | Age: 83
End: 2023-12-06
Payer: MEDICARE

## 2023-12-06 DIAGNOSIS — I69.354 HEMIPLEGIA AND HEMIPARESIS FOLLOWING CEREBRAL INFARCTION AFFECTING LEFT NON-DOMINANT SIDE: ICD-10-CM

## 2023-12-06 DIAGNOSIS — Z78.9 DECREASED ACTIVITIES OF DAILY LIVING (ADL): ICD-10-CM

## 2023-12-06 DIAGNOSIS — M62.81 DECREASED MUSCLE STRENGTH: ICD-10-CM

## 2023-12-06 DIAGNOSIS — I69.30 LATE EFFECT OF CEREBROVASCULAR ACCIDENT (CVA): Primary | ICD-10-CM

## 2023-12-06 DIAGNOSIS — R29.898 FINE MOTOR IMPAIRMENT: ICD-10-CM

## 2023-12-06 DIAGNOSIS — R29.818 FINE MOTOR IMPAIRMENT: ICD-10-CM

## 2023-12-06 DIAGNOSIS — R29.898 DECREASED GRIP STRENGTH OF LEFT HAND: ICD-10-CM

## 2023-12-06 DIAGNOSIS — R41.841 COGNITIVE COMMUNICATION DISORDER: Primary | ICD-10-CM

## 2023-12-06 PROCEDURE — 97110 THERAPEUTIC EXERCISES: CPT

## 2023-12-06 PROCEDURE — 97112 NEUROMUSCULAR REEDUCATION: CPT

## 2023-12-06 PROCEDURE — 97129 THER IVNTJ 1ST 15 MIN: CPT | Mod: KX | Performed by: SPEECH-LANGUAGE PATHOLOGIST

## 2023-12-06 PROCEDURE — 97530 THERAPEUTIC ACTIVITIES: CPT

## 2023-12-06 PROCEDURE — 97130 THER IVNTJ EA ADDL 15 MIN: CPT | Mod: KX | Performed by: SPEECH-LANGUAGE PATHOLOGIST

## 2023-12-06 NOTE — TELEPHONE ENCOUNTER
Recommend office visit to address these concerns. Seek immediate medical attention for falls with injury.

## 2023-12-06 NOTE — TELEPHONE ENCOUNTER
Pt states she is not really having falls and it was just a mistake the one time because she tripped over her dog    Pt states she does not need to be evaluated or need a appt at this time.

## 2023-12-06 NOTE — PROGRESS NOTES
[facility]  Speech Therapy Treatment Note- Neurological Rehabilitation  Date: 12/6/2023     Name: Leslie Wood   MRN: [id number]   Therapy Diagnosis:   Encounter Diagnosis   Name Primary?    Cognitive communication disorder Yes   Physician: Marily Millan NP  Physician Orders: Ambulatory Referral to Speech Therapy   Medical Diagnosis: Disorientation [R41.0], Aphasia [R47.01]     Visit #/ Visits Authorized: 3/ 16 (+eval)  Date of Evaluation:  11/8/23  Insurance Authorization Period: 11/15/2023 - 12/31/2023   Plan of Care Expiration Date:    1/31/24  Extended Plan of Care:  -   Progress Note: 12/8/23     Time In:  9:46 AM  Time Out:  10:30 AM  Total Billable Time: 44 mins      Precautions: Standard, Fall, Cognition, and Communication  Subjective:   Patient reports: she was throwing up and potentially dealing with a UTI on Monday which is why she missed her appointment. She reports her knee has been feeling better this week.   She was compliant to home exercise program.     Response to previous treatment: first treatment session    Pain Scale: 9/10 on a Visual Analog Scale currently.  Pain Location: back and leg  Objective:   TIMED  Procedure Min.   Cognitive Therapeutic Interventions, first 15 minutes CPT 32344  15   Cognitive Therapeutic Interventions, each additional 15 minutes CPT 32201  29           Short Term Goals: (6 weeks) Current Progress:   Patient will sustain attention to a simple task (auditory or visual) for 3 minutes with 90% accuracy or no more than 2 redirections.      Progressing/ Not Met 12/6/2023   Auditory:   Visual: met x2 Auditory Sustained Attention:  Several sustained auditory attention tasks completed in today's session. Patient listened to voicemails in quiet environment, requesting repetition of voicemail as needed and then answered questions about what she heard. She required minimal-moderate cueing for use of strategies including taking notes and requesting repetition  of voicemail as needed and then answered questions with 72% accuracy in 18/25 trials. Improvement when she did use strategies; however, inconsistent use of strategies resulted in inconsistent accuracy. Will continue to progress per patient tolerance in upcoming sessions.     Visual Sustained Attention:  Patient completed several visual sustained attention tasks in today's session in which she visually scanned for letters/numbers in a paragraph.   Trial 1: disorganized scanning pattern, completed in 48 seconds with 1 errors  Trial 2: improved scanning pattern, 50 seconds with 1 error  Trial 3: organized scanning pattern, 46 seconds, 100% accuracy   Trial 4: organized scanning pattern, 44 seconds, 100% accuracy      2. Patient will complete selective attention tasks (auditory or visual) with 90% accuracy independently increase selective attention.      Progressing/ Not Met 12/6/2023  Not formally addressed   3. Patient will use attention shifting strategies to shift attention between two tasks (auditory or visual) with no more than 3 cues or 90% accuracy to improve alternating attention.      Progressing/ Not Met 12/6/2023  Not formally addressed    4. Patient will recall functional information (name, birthday, address, etc) following delays of 2-3 minutes following a prompt questions across 3 therapy sessions with 80% accuracy (spaced retrieval).      Progressing/ Not Met 12/6/2023   Not formally addressed     5. Patient will complete a functional task to improve attention, memory and/or executive functions (I.e. sample bill paying activity, recipe, or multiple choice comprehension questions to 1 paragraphs) with 80% accuracy and natural environment noise distractions (TV news background, music, etc.).      Progressing/ Not Met 12/6/2023    Not formally addressed     6. Patient will be educated regarding cognitive deficits as applicable to the patient's life for increased awareness and will execute returned  demonstration of information with 80% accuracy.       Progressing/ Not Met 2023  Not formally addressed     7. Patient will participate in continued cognitive assessment of right hemisphere dysfunction.     Goal Met 2023   See results of RICE below.      Patient Education/Response:   Patient educated regarding the followin. Cognitive triangle as it results to assessment findings/impact on functioning   2. Impact of specific right hemisphere dysfunction on reading, inattention/left neglect, ability to complete daily tasks     Home program established: Patient instructed to continue prior program  Patient verbalized understanding to all above education provided.     See Electronic Medical Record under Patient Instructions for exercises provided throughout therapy.  Assessment:   Patient participated in auditory and visual sustained attention tasks in today's session. Overall, improved accuracy and efficiency given use of strategies (efficient visual scanning pattern and repetition of information/taking notes as needed) throughout tasks. Will continue to progress per patient tolerance.     Leslie is progressing well towards her goals. Current goals remain appropriate. Goals to be updated as necessary.     Patient prognosis is Fair. Patient will continue to benefit from skilled outpatient speech and language therapy to address the deficits listed in the problem list on initial evaluation, provide patient/family education and to maximize patient's level of independence in the home and community environment.     Medical necessity is demonstrated by the following IMPAIRMENTS:  Cognition: Deficits in executive functioning, attention, and memory prevent the pt from relaying medically and safety relevant information in a timely manner in a state of emergency.     Barriers to Therapy: NA  Patient's spiritual, cultural and educational needs considered and patient agreeable to plan of care and goals.  Plan:    Continue Plan of Care with focus on rehabilitation and compensation for attention, memory, and executive functioning deficits impacting safety and efficiency of daily task completion.     Rabia Lane MA, CCC-SLP  Speech Language Pathologist  12/6/2023

## 2023-12-06 NOTE — TELEPHONE ENCOUNTER
----- Message from Eileen Nance sent at 12/6/2023  2:00 PM CST -----  Contact: Leslie   Patient is returning a phone call.  Who left a message for the patient: Tami   Does patient know what this is regarding:  not sure   Would you like a call back, or a response through your [facility] portal?:  call back   Comments:

## 2023-12-06 NOTE — PROGRESS NOTES
[facility]  Occupational Therapy Treatment Note     Date: 12/6/2023  Name: Leslie CASTELLON [facility] Number: [id number]    Therapy Diagnosis:     Encounter Diagnoses   Name Primary?    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     Decreased  strength of left hand     Decreased muscle strength     Decreased activities of daily living (ADL)     Fine motor impairment          Physician: Marily Millan NP    Physician Orders: Occupational Therapy to Evaluate and Treat   Medical Diagnosis: R29.898 (ICD-10-CM) - Poor fine motor skills  Surgical Procedure and Date: N/A     Evaluation Date: 11/8/2023  Insurance Authorization Period Expiration: 11/8/2023 - 12/31/2023  Plan of Care Certification Period: 11/8/2023 - 02/08/2024  Progress Note Due: 12/8/2023     Date of Return to MD: N/A     Visit # / Visits authorized: 3/25  FOTO: 1/3     Precautions:  Standard    Time In: 9:00  Time Out: 9:45  Total Treatment Time: 45 minutes  Total Billable Time: 45 minutes    Subjective     Patient reports: She feels weak today. Her knee is hurting her. She was sick all weekend    she was compliant with home exercise program given last session.   Response to previous treatment: Tolerated well  Functional change: Improved knowledge of HEP    Pain: 6/10  Location: bilateral hands     Objective     Please see progress note dated 8/11/2023 for updated objective measures    Treatment     Supervised Modalities: Modalities such as hot/cold packs, traction, unattended electrical stimulation, paraffin bath, fluidotherapy to reduce pain and increase soft tissue extensibility. Leslie received (0) minutes of modalities listed below after being cleared for contraindications.  x = modalities performed     Supervised Modalities 12/6/2023                            Manual Therapy Techniques: Joint mobilizations, Soft tissue Mobilization,  and mobilization with movement applied to the area listed below. Leslie received (0) minutes of interventions. x = intervention performed today    Manual Intervention 12/6/2023    Soft Tissue Mobilization     Joint Mobilizations     Mobilization with movement              Therapeutic Exercises: to develop strength, endurance, ROM, flexibility, posture and core stabilization. Leslie received (15) minutes of exercises listed below. x = performed today * = level of progression of activity     Therapeutic Exercise 12/6/2023    Interossei  -green foam  -rubber bands x 20x bilateral    Digi flexion - red x 20x bilateral    Resistive pinch - red  -2pt  -3pt  -lateral x 20x bilateral    Thumb palmar   -adduction  -abduction x 20x bilateral    Thumb radial  -adduction  -abduction x 20x bilateral    Forearm exercise  - wrist flexion  -wrist extension  - supination  -pronation x 2lb        Therapeutic Activities: Everyday tasks to address the combined need of improved strength, range of motion, and endurance to achieve increased independence. While simulating these activities, the person is applying the gained skills of strength and improved range of motion in a practical way.  Leslie received (15) minutes of activities listed below. x = activities performed today * = level of progression of activity     Therapeutic Activities 12/6/2023    Velcro board  -lateral pinch  - dowel  x 20x bilateral    Opening and closing containers x 5 minutes, ergonomics               Neuromuscular Re-education: the use of functional strengthening, stretching, balancing and coordination activities that train the nerves and muscles to react and communicate to restore normal body movement patterns to increase self care independence. Leslie received (15) minutes of interventions listed below.  x = interventions performed today * = level of progression of activity     Neuromuscular Re-education 12/6/2023    Finger opposition  x 20x   Finger taps  (extension) x 20x   Finger opposition with slides x    Table Top active assist range of motion  - shoulder flexion  - shoulder abduction  x 5x - 15 second holds     Self Care: Fundamental skills required to independently care for oneself. Activities of daily living such as eating, bathing, dressing, toileting, grooming, sleeping, transfers and mobility. Instrumental activities of daily living such as driving, medication management, pet care, home management/cleaning, financial management, using the phone, meal preparation and shopping. Leslie received (0) minutes of interventions listed below.  x = interventions performed * = level of progression of activity     Self Care 12/6/2023                            Home Exercises and Education Provided     Education provided:   - home exercise program education provided  - progress towards goals     Written Home Exercises Provided: Patient instructed to cont prior HEP.  Exercises were reviewed and Leslie was able to demonstrate them prior to the end of the session. Leslie demonstrated good understanding of the home exercise program provided.     See EMR under Patient Instructions for exercises provided prior visit.        Assessment     Patient tolerated exercises well. Needs verbal cues for hand placement.     Leslie is progressing towards her goals and there are no updates to goals at this time. Patient prognosis is Good.     Patient will continue to benefit from skilled outpatient occupational therapy to address the deficits listed in the problem list on initial evaluation provide patient/family education and to maximize patient's level of independence in the home and community environment.     Patient's spiritual, cultural and educational needs considered and patient agreeable to plan of care and goals.    Anticipated barriers to occupational therapy: Cognition, home exercise program compliance    Goals:     Short Term Goals:  6 weeks  Progress    Pain: Patient will  demonstrate improved pain by reports of less than or equal to 7/10 worst pain on the verbal rating scale in order to progress toward maximal functional ability and improve quality of life. PC   Function: Patient will demonstrate improved function as indicated by a functional limitation score of less than or equal to 46 out of 100 on FOTO. PC   Mobility: Patient will improve active range of motion to 50% of stated goals, listed in objective measures above, in order to progress towards independence with functional activities.  PC   Strength: Patient will improve strength to 50% of stated goals, listed in objective measures above, in order to progress towards independence with functional activities.  PC   HEP: Patient will demonstrate independence with home exercise program in order to progress toward functional independence. PC      Long Term Goals:  12 weeks  Progress   Pain: Patient will demonstrate improved pain by reports of less than or equal to 6/10 worst pain on the verbal rating scale in order to progress toward maximal functional ability and improve quality of life.   PC   Function: Patient will demonstrate improved function as indicated by a functional limitation score of less than or equal to 48 out of 100 on FOTO. PC   Mobility: Patient will improve active range of motion to stated goals, listed in objective measures above, in order to return to maximal functional potential and improve quality of life. PC   Strength: Patient will improve strength to stated goals, listed in objective measures above, in order to improve functional independence and quality of life. PC   Patient will return to normal ADL's, IADL's, community involvement, recreational activities, and work-related activities with less than or equal to 5/10 pain and maximal function.  PC      Goals Key:  PC= Progressing/Continue;       PM= Partially Met;       GM= Goal Met,      DC= Discontinue    Plan     Continue Plan of Care (POC) and progress  per patient tolerance.      Ivana Gallo, OT  ,OTD, OTR/L

## 2023-12-06 NOTE — TELEPHONE ENCOUNTER
MRN: [id number]   Pharmacy: [facility] [address] [address]   Phone: [phone]   Fax: [phone]     Hello,     I spoke with the patient today for a monthly check in. Pt informed me that she had a fall two weeks ago now outside in the yard. Pt denied any injuries but stated fell and was unable to get back up for about an hour or so. Pt also mentioned having white bumps like pimples on arms that come and go. Pt denied anything coming out of bumps or sxs associated with bumps. I wanted to update PCP on this information today. Please let me know if I can assist any further. I can be reached via [employer] or number below. Thank you     Ivon GUERIN LPN   Care Coordinator, [facility]   [phone] ext [phone]

## 2023-12-11 ENCOUNTER — CLINICAL SUPPORT (OUTPATIENT)
Dept: REHABILITATION | Facility: HOSPITAL | Age: 83
End: 2023-12-11
Payer: MEDICARE

## 2023-12-11 DIAGNOSIS — I69.30 LATE EFFECT OF CEREBROVASCULAR ACCIDENT (CVA): Primary | ICD-10-CM

## 2023-12-11 DIAGNOSIS — R29.898 FINE MOTOR IMPAIRMENT: ICD-10-CM

## 2023-12-11 DIAGNOSIS — R29.898 DECREASED GRIP STRENGTH OF LEFT HAND: ICD-10-CM

## 2023-12-11 DIAGNOSIS — Z78.9 DECREASED ACTIVITIES OF DAILY LIVING (ADL): ICD-10-CM

## 2023-12-11 DIAGNOSIS — M62.81 DECREASED MUSCLE STRENGTH: ICD-10-CM

## 2023-12-11 DIAGNOSIS — R29.818 FINE MOTOR IMPAIRMENT: ICD-10-CM

## 2023-12-11 DIAGNOSIS — I69.354 HEMIPLEGIA AND HEMIPARESIS FOLLOWING CEREBRAL INFARCTION AFFECTING LEFT NON-DOMINANT SIDE: ICD-10-CM

## 2023-12-11 PROCEDURE — 97110 THERAPEUTIC EXERCISES: CPT

## 2023-12-11 PROCEDURE — 97530 THERAPEUTIC ACTIVITIES: CPT

## 2023-12-11 PROCEDURE — 97112 NEUROMUSCULAR REEDUCATION: CPT

## 2023-12-11 NOTE — PROGRESS NOTES
[facility]  Occupational Therapy Treatment Note     Date: 12/11/2023  Name: Leslie CASTELLON Jerome  [facility] Number: [id number]    Therapy Diagnosis:     Encounter Diagnoses   Name Primary?    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     Decreased  strength of left hand     Decreased muscle strength     Decreased activities of daily living (ADL)     Fine motor impairment          Physician: Marily Millan NP    Physician Orders: Occupational Therapy to Evaluate and Treat   Medical Diagnosis: R29.898 (ICD-10-CM) - Poor fine motor skills  Surgical Procedure and Date: N/A     Evaluation Date: 11/8/2023  Insurance Authorization Period Expiration: 11/8/2023 - 12/31/2023  Plan of Care Certification Period: 11/8/2023 - 02/08/2024  Progress Note Due: 12/8/2023     Date of Return to MD: N/A     Visit # / Visits authorized: 4/25  FOTO: 1/3     Precautions:  Standard    Time In: 10:00  Time Out: 10:40  Total Treatment Time: 40 minutes  Total Billable Time: 40 minutes    Subjective     Patient reports: She feels weak today. Her knee is hurting her. She was sick all weekend    she was compliant with home exercise program given last session.   Response to previous treatment: Tolerated well  Functional change: Improved knowledge of HEP    Pain: 6/10  Location: bilateral hands     Objective     Joint Evaluation  AROM  11/8/2023 AROM  11/8/2023 PROM   11/8/2023 AROM  12/11/2023 Goal  Right     Left Right Right Right  Active    Shoulder Flexion 0-180 Within normal limits  80 105 90  110   Shoulder Abduction 0-180  80 105  90 110   Shoulder External Rotation 0-90  25 40       Shoulder Internal Rotation 0-90  45 60       Shoulder Extension 0-60  10 25       Shoulder Horizontal Adduction 0-90  Within normal limits  Within normal limits        Elbow Flexion 0-150          Elbow Extension 0          Wrist Flexion 0-80           Wrist Extension 0-70          Wrist Supination 0-80          Wrist Pronation 0-80          Wrist Ulnar Deviation 0-30          Wrist Radial Deviation 0-20            Fist: normal        Strength 11/8/2023 11/8/2023 12/11/2023 12/11/2023     **within available ROM** Left Right Left Right Goal bilateral   Shoulder Flexion 3+/5 3-/5 4-/5 3/5 4/5   Shoulder Abduction 3+/5 3-/5   4/5   Shoulder External Rotation  3+/5 3-/5   4/5   Shoulder Internal Rotation 3+/5 3-/5   4/5   Shoulder Extension 3+/5 3-/5   4/5   Shoulder Horizontal Adduction 3+/5 3-/5   4/5   Elbow Flexion 3+/5 3+/5   4/5   Elbow Extension 3+/5 3+/5   4/5   Wrist Flexion 3+/5 3+/5   4/5   Wrist Extension 3+/5 3+/5   4/5   Supination 3+/5 3+/5   4/5   Pronation 3+/5 3+/5   4/5   Ulnar Deviation 3+/5 3+/5   4/5   Radial Deviation 3+/5 3+/5   4/5       Strength: (MUNDO Dynamometer in lbs.) Average 3 trials, Position II:       11/8/2023 11/8/2023 12/11/2023 12/11/2023 Goal     Left Right Left Right Left   Rung II 25# 35# 27# 36# 31#      Pinch Strength (Measured in psi)       11/8/2023 11/8/2023     Left Right   2pt Pinch 4 psi 7 psi   3pt Pinch 4 psi 7 psi   Lateral Pinch 7 psi 10 psi        Treatment     Supervised Modalities: Modalities such as hot/cold packs, traction, unattended electrical stimulation, paraffin bath, fluidotherapy to reduce pain and increase soft tissue extensibility. Leslie received (0) minutes of modalities listed below after being cleared for contraindications.  x = modalities performed     Supervised Modalities 12/11/2023                            Manual Therapy Techniques: Joint mobilizations, Soft tissue Mobilization, and mobilization with movement applied to the area listed below. Leslie received (0) minutes of interventions. x = intervention performed today    Manual Intervention 12/11/2023    Soft Tissue Mobilization     Joint Mobilizations     Mobilization with movement              Therapeutic Exercises: to develop  strength, endurance, ROM, flexibility, posture and core stabilization. Leslie received (15) minutes of exercises listed below. x = performed today * = level of progression of activity     Therapeutic Exercise 12/11/2023    Interossei  -green foam  -rubber bands x 20x bilateral    Digi flexion - red x 20x bilateral    Resistive pinch - red  -2pt  -3pt  -lateral x 20x bilateral    Thumb palmar   -adduction  -abduction x 20x bilateral    Thumb radial  -adduction  -abduction x 20x bilateral    Forearm exercise  - wrist flexion  -wrist extension  - supination  -pronation x 2lb        Therapeutic Activities: Everyday tasks to address the combined need of improved strength, range of motion, and endurance to achieve increased independence. While simulating these activities, the person is applying the gained skills of strength and improved range of motion in a practical way.  Leslie received (15) minutes of activities listed below. x = activities performed today * = level of progression of activity     Therapeutic Activities 12/11/2023    Velcro board  -lateral pinch  - dowel  x 20x bilateral    Opening and closing containers x 5 minutes, ergonomics               Neuromuscular Re-education: the use of functional strengthening, stretching, balancing and coordination activities that train the nerves and muscles to react and communicate to restore normal body movement patterns to increase self care independence. Leslie received (15) minutes of interventions listed below.  x = interventions performed today * = level of progression of activity     Neuromuscular Re-education 12/11/2023    Finger opposition  x 20x   Finger taps (extension) x 20x   Finger opposition with slides x    Table Top active assist range of motion  - shoulder flexion  - shoulder abduction  x 5x - 15 second holds     Self Care: Fundamental skills required to independently care for oneself. Activities of daily living such as eating, bathing, dressing, toileting,  grooming, sleeping, transfers and mobility. Instrumental activities of daily living such as driving, medication management, pet care, home management/cleaning, financial management, using the phone, meal preparation and shopping. Leslie received (0) minutes of interventions listed below.  x = interventions performed * = level of progression of activity     Self Care 12/11/2023                            Home Exercises and Education Provided     Education provided:   - home exercise program education provided  - progress towards goals     Written Home Exercises Provided: Patient instructed to cont prior HEP.  Exercises were reviewed and Leslie was able to demonstrate them prior to the end of the session. Leslie demonstrated good understanding of the home exercise program provided.     See EMR under Patient Instructions for exercises provided prior visit.        Assessment     Patient tolerated exercises well. Needs verbal cues for hand placement.     Leslie is progressing towards her goals and there are no updates to goals at this time. Patient prognosis is Good.     Patient will continue to benefit from skilled outpatient occupational therapy to address the deficits listed in the problem list on initial evaluation provide patient/family education and to maximize patient's level of independence in the home and community environment.     Patient's spiritual, cultural and educational needs considered and patient agreeable to plan of care and goals.    Anticipated barriers to occupational therapy: Cognition, home exercise program compliance    Goals:     Short Term Goals:  6 weeks  Progress    Pain: Patient will demonstrate improved pain by reports of less than or equal to 7/10 worst pain on the verbal rating scale in order to progress toward maximal functional ability and improve quality of life. PC   Function: Patient will demonstrate improved function as indicated by a functional limitation score of less than or equal to 46  out of 100 on FOTO. PC   Mobility: Patient will improve active range of motion to 50% of stated goals, listed in objective measures above, in order to progress towards independence with functional activities.  PC   Strength: Patient will improve strength to 50% of stated goals, listed in objective measures above, in order to progress towards independence with functional activities.  PC   HEP: Patient will demonstrate independence with home exercise program in order to progress toward functional independence. PC      Long Term Goals:  12 weeks  Progress   Pain: Patient will demonstrate improved pain by reports of less than or equal to 6/10 worst pain on the verbal rating scale in order to progress toward maximal functional ability and improve quality of life.   PC   Function: Patient will demonstrate improved function as indicated by a functional limitation score of less than or equal to 48 out of 100 on FOTO. PC   Mobility: Patient will improve active range of motion to stated goals, listed in objective measures above, in order to return to maximal functional potential and improve quality of life. PC   Strength: Patient will improve strength to stated goals, listed in objective measures above, in order to improve functional independence and quality of life. PC   Patient will return to normal ADL's, IADL's, community involvement, recreational activities, and work-related activities with less than or equal to 5/10 pain and maximal function.  PC      Goals Key:  PC= Progressing/Continue;       PM= Partially Met;       GM= Goal Met,      DC= Discontinue    Plan     Continue Plan of Care (POC) and progress per patient tolerance.      Ivana Galol, OT  ,OTD, OTR/L

## 2023-12-13 ENCOUNTER — CLINICAL SUPPORT (OUTPATIENT)
Dept: REHABILITATION | Facility: HOSPITAL | Age: 83
End: 2023-12-13
Payer: MEDICARE

## 2023-12-13 DIAGNOSIS — R26.89 BALANCE PROBLEM: ICD-10-CM

## 2023-12-13 DIAGNOSIS — Z74.09 IMPAIRED FUNCTIONAL MOBILITY, BALANCE, GAIT, AND ENDURANCE: ICD-10-CM

## 2023-12-13 DIAGNOSIS — R41.841 COGNITIVE COMMUNICATION DISORDER: Primary | ICD-10-CM

## 2023-12-13 DIAGNOSIS — M54.50 LUMBOSACRAL PAIN: Primary | ICD-10-CM

## 2023-12-13 DIAGNOSIS — M25.60 RANGE OF MOTION DEFICIT: ICD-10-CM

## 2023-12-13 PROCEDURE — 97140 MANUAL THERAPY 1/> REGIONS: CPT

## 2023-12-13 PROCEDURE — 97110 THERAPEUTIC EXERCISES: CPT

## 2023-12-13 PROCEDURE — 97129 THER IVNTJ 1ST 15 MIN: CPT | Mod: KX | Performed by: SPEECH-LANGUAGE PATHOLOGIST

## 2023-12-13 PROCEDURE — 97130 THER IVNTJ EA ADDL 15 MIN: CPT | Mod: KX | Performed by: SPEECH-LANGUAGE PATHOLOGIST

## 2023-12-13 NOTE — PROGRESS NOTES
[facility]  Speech Therapy Treatment Note- Neurological Rehabilitation  Date: 12/13/2023     Name: Leslie Wood   MRN: [id number]   Therapy Diagnosis:   Encounter Diagnosis   Name Primary?    Cognitive communication disorder Yes   Physician: Marily Millan NP  Physician Orders: Ambulatory Referral to Speech Therapy   Medical Diagnosis: Disorientation [R41.0], Aphasia [R47.01]     Visit #/ Visits Authorized: 4/ 16 (+eval)  Date of Evaluation:  11/8/23  Insurance Authorization Period: 11/15/2023 - 12/31/2023   Plan of Care Expiration Date:    1/31/24  Extended Plan of Care:  -   Progress Note: 12/8/23     Time In:  9:07 AM  Time Out:  9:50 AM  Total Billable Time: 43 mins      Precautions: Standard, Fall, Cognition, and Communication  Subjective:   Patient reports: her granddaughter mixed up times for therapy, so she missed appointment on Monday.   She was compliant to home exercise program.     Response to previous treatment: NA    Pain Scale: 9/10 on a Visual Analog Scale currently.  Pain Location: back and leg  Objective:   TIMED  Procedure Min.   Cognitive Therapeutic Interventions, first 15 minutes CPT 85538  15   Cognitive Therapeutic Interventions, each additional 15 minutes CPT 46622  28           Short Term Goals: (6 weeks) Current Progress:   Patient will sustain attention to a simple task (auditory or visual) for 3 minutes with 90% accuracy or no more than 2 redirections.      Goal Met 12/13/2023   Auditory: met x2  Visual: met x7 Auditory Sustained Attention:  Several sustained auditory attention tasks completed in today's session. Patient listened to multi-step commands and followed with 91% accuracy. She required initial verbal cueing for use of strategies including taking requesting repetition as needed. Improved use of strategies independently with overall improved accuracy in today's session.    Visual Sustained Attention:  Patient completed several visual sustained attention tasks  in today's session in which she visually scanned for letters/numbers in a paragraph.   Trial 1: 1:30, 100% accuracy, 1/7 relative scale of cognitive load   Trial 2: 1:45, 100% accuracy, 1/7 relative scale of cognitive load   Trial 3: 1:30, 100% accuracy, 1/7 relative scale of cognitive load    Trial 4: 2:08, -7 (missed entire row of scanning)   Trial 5: 2:10, 100% accuracy, 1/7 relative scale of cognitive load   Trial 6: 1:30, 100% accuracy, 1/7 relative scale of cognitive load     Overall improved use of strategies to facilitate visual scanning pattern as well as good awareness of errors. Overall high accuracy of completion with low cognitive load.       2. Patient will complete selective attention tasks (auditory or visual) with 90% accuracy independently increase selective attention.      Progressing/ Not Met 12/13/2023  Not formally addressed   3. Patient will use attention shifting strategies to shift attention between two tasks (auditory or visual) with no more than 3 cues or 90% accuracy to improve alternating attention.      Progressing/ Not Met 12/13/2023  Not formally addressed    4. Patient will recall functional information (name, birthday, address, etc) following delays of 2-3 minutes following a prompt questions across 3 therapy sessions with 80% accuracy (spaced retrieval).      Progressing/ Not Met 12/13/2023   Not formally addressed     5. Patient will complete a functional task to improve attention, memory and/or executive functions (I.e. sample bill paying activity, recipe, or multiple choice comprehension questions to 1 paragraphs) with 80% accuracy and natural environment noise distractions (TV news background, music, etc.).      Progressing/ Not Met 12/13/2023    Not formally addressed     6. Patient will be educated regarding cognitive deficits as applicable to the patient's life for increased awareness and will execute returned demonstration of information with 80% accuracy.        Progressing/ Not Met 2023  Not formally addressed     7. Patient will participate in continued cognitive assessment of right hemisphere dysfunction.     Goal Met 2023   See results of RICE below.      Patient Education/Response:   Patient educated regarding the followin. Cognitive triangle as it results to assessment findings/impact on functioning   2. Impact of specific right hemisphere dysfunction on reading, inattention/left neglect, ability to complete daily tasks     Home program established: Patient instructed to continue prior program  Patient verbalized understanding to all above education provided.     See Electronic Medical Record under Patient Instructions for exercises provided throughout therapy.  Assessment:   Patient participated in auditory and visual sustained attention tasks in today's session. Overall, improved accuracy and efficiency given use of strategies (efficient visual scanning pattern and repetition of information/taking notes as needed) throughout tasks with overall low cognitive load. Will continue to progress per patient tolerance, moving toward selective auditory attention tasks in next session.     Leslie is progressing well towards her goals. Current goals remain appropriate. Goals to be updated as necessary.     Patient prognosis is Fair. Patient will continue to benefit from skilled outpatient speech and language therapy to address the deficits listed in the problem list on initial evaluation, provide patient/family education and to maximize patient's level of independence in the home and community environment.     Medical necessity is demonstrated by the following IMPAIRMENTS:  Cognition: Deficits in executive functioning, attention, and memory prevent the pt from relaying medically and safety relevant information in a timely manner in a state of emergency.     Barriers to Therapy: NA  Patient's spiritual, cultural and educational needs considered and patient  agreeable to plan of care and goals.  Plan:   Continue Plan of Care with focus on rehabilitation and compensation for attention, memory, and executive functioning deficits impacting safety and efficiency of daily task completion.     Rabia Lane MA, CCC-SLP  Speech Language Pathologist  12/13/2023

## 2023-12-14 NOTE — PROGRESS NOTES
[facility]   Physical Therapy Treatment Note        Name: Leslie CASTELLON [facility] Number: [id number]    Therapy Diagnosis:   Encounter Diagnoses   Name Primary?    Lumbosacral pain Yes    Impaired functional mobility, balance, gait, and endurance     Range of motion deficit     Balance problem      Physician: Marily Millan NP    Visit Date: 12/13/2023    Physician Orders: PT Eval and Treat   Medical Diagnosis from Referral:   Late effect of cerebrovascular accident (CVA)   Hemiplegia and hemiparesis following cerebral infarction affecting left non-dominant side   Gait difficulty   Evaluation Date: 11/16/2023  Authorization Period Expiration: 10/16/2024  Plan of Care Expiration: 2/16/2024  Progress Note Due: 12/16/2023  Visit # / Visits authorized: 1/20 (+ 1 eval)   FOTO: 1/ 3      Precautions: Standard and Fall  PTA Visit #: 0/5     Time In: 1000  Time Out: 1100  Total Billable Time: 60 minutes (Billing reflects 1 on 1 treatment time spent with patient)    Subjective     Patient reports: that she experiences a bit of pain at the lower back region..    He/She was partially compliant with home exercise program.  Response to previous treatment: some pain relief  Functional change: Some increased speed with walking    Pain: 5/10     Location: lumbar paraspinal region    Objective      Objective Measures updated at progress report or POC update only unless otherwise noted.     Palpation: Trigger points on palpation of the lumbosacral paraspinal/multifidus musculature.    Treatment     Leslie received the treatments listed below:     THERAPEUTIC EXERCISES to develop strength, endurance, ROM, flexibility, posture, and core stabilization for (30) minutes including:    Intervention Performed Today    nustep x X 8 minutes   Lower trunk rotations x 10 second holds x 10   Pelvic tilts x Anterior /posterior with cueing - 10 second holds x 10   Single knee to chest x 10 second holds x 10   Sidelying hip  abduction x 2x10   Prone hip extension x 2x10               Plan for Next Visit:         MANUAL THERAPY TECHNIQUES were applied for (30) minutes, including:    Manual Intervention Performed Today    Soft Tissue Mobilization x  STM bilateral lumbosacral PSM/multifidus and gluteal   Joint Mobilizations     Mobilization with movement          Functional Dry Needling  x FDN performed to bilateral lumbosacral PSM/multifidus and left glut med region     Plan for Next Visit:         Patient Education and Home Exercises       Home Exercises Provided and Patient Education Provided     Education provided: (5) minutes        Written Home Exercises Provided: yes.  Exercises were reviewed and Leslie was able to demonstrate them prior to the end of the session.  Leslie demonstrated fair  understanding of the education provided. See EMR under Patient Instructions for exercises provided during therapy sessions.    Assessment     Patient reports that she experiences significant pain at the left gluteal and lumbar paraspinal/multifidus region.  Therefore, FDN performed into the regions.  There was relief of pain on completion.  Therefore, multiple exercises performed. Will continue to work on exercises and continue to encourage participation in an exercise regimen with family members.    Leslie is progressing well towards her goals.   Patient prognosis is Fair.     Patient will continue to benefit from skilled outpatient physical therapy to address the deficits listed in the problem list box on initial evaluation, provide pt/family education and to maximize patient's level of independence in the home and community environment.     Patient's spiritual, cultural and educational needs considered and pt agreeable to plan of care and goals.     Anticipated Barriers for therapy: Anxiety or Panic Disorders, Arthritis, Back pain, Depression, Headaches, High  Blood Pressure, Kidney, Bladder, Prostate or Urination Problems, Osteoporosis, Prior  Surgery, Stroke or TIA, Visual Impairment      Goals: Reviewed:11/16/2023    Short Term Goals: In 4 weeks   1.Patient to be educated on HEP.  2. Patient  to increase lumbar spine ROM to B SB 30 deg, B Rot 75%  3. Patient  to increase hip PROM to 60 deg ER bilaterally, IR 20 deg bilaterally, in order to assist with more normalized gait pattern.  4. Patient  to increase B LE and core strength to 4-/5 in order to improve gait and balance.   5. Patient to increased right knee extn ROM to -5 deg for improved gait mechanics  6. Patient   to have pain less than 2/10 at worst, to improve QOL.  7. Patient  to improve score on the FOTO, to improve QOL.  8. Patient  to be educated on postural/body mechanics awareness.     Long Term Goals: In 8 weeks  1.Patient to improve score on the FOTO to 48 or better, to improve QOL.  2. Patient to demo increase in LE strength to 4/5, in order to improve endurance and increase ability to ambulate for increased time.  3. Patient to have decreased pain to 2/10 at worst, to improve QOL.  4. Patient to demo increase lumbar ROM to ,  SB 30 deg, B Rot 90%in order to improve available range of motion for ADL's.  .  5. Patient  to increase hip PROM to Extn +5 deg,  60 deg ER bilaterally, IR 20 deg bilaterally, in order to assist with more normalized gait pattern.  6. Patient to increased right knee extn ROM to -5 deg for improved gait mechanics  6. Patient to perform daily activities without increased symptoms including:  Prolonged walking x 10 minutes.  Ascending.descending 6 inch step without upper extremities assistance  Return to gym and work outings 2 times per week with family transportation         Plan     Continue Plan of Care (POC) and progress per patient tolerance. See treatment section for details on planned progressions next session.      Leoncio Lujan, PT

## 2023-12-18 ENCOUNTER — CLINICAL SUPPORT (OUTPATIENT)
Dept: REHABILITATION | Facility: HOSPITAL | Age: 83
End: 2023-12-18
Payer: MEDICARE

## 2023-12-18 DIAGNOSIS — R26.9 GAIT ABNORMALITY: Primary | ICD-10-CM

## 2023-12-18 DIAGNOSIS — R41.841 COGNITIVE COMMUNICATION DISORDER: Primary | ICD-10-CM

## 2023-12-18 PROBLEM — R68.89 DECREASED STRENGTH, ENDURANCE, AND MOBILITY: Status: ACTIVE | Noted: 2023-05-17

## 2023-12-18 PROBLEM — Z74.09 DECREASED STRENGTH, ENDURANCE, AND MOBILITY: Status: ACTIVE | Noted: 2023-05-17

## 2023-12-18 PROBLEM — R53.1 DECREASED STRENGTH, ENDURANCE, AND MOBILITY: Status: ACTIVE | Noted: 2023-05-17

## 2023-12-18 PROCEDURE — 97110 THERAPEUTIC EXERCISES: CPT

## 2023-12-18 PROCEDURE — 97129 THER IVNTJ 1ST 15 MIN: CPT | Mod: KX | Performed by: SPEECH-LANGUAGE PATHOLOGIST

## 2023-12-18 PROCEDURE — 97140 MANUAL THERAPY 1/> REGIONS: CPT

## 2023-12-18 PROCEDURE — 97130 THER IVNTJ EA ADDL 15 MIN: CPT | Mod: KX | Performed by: SPEECH-LANGUAGE PATHOLOGIST

## 2023-12-18 NOTE — PROGRESS NOTES
[facility]   Physical Therapy Treatment Note        Name: Leslie CASTELLON [facility] Number: [id number]    Therapy Diagnosis:   No diagnosis found.    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    He/She was partially compliant with home exercise program.  Response to previous treatment: some pain relief  Functional change: Some increased speed with walking    Pain: 5/10     Location: lumbar paraspinal region    Objective      Objective Measures updated at progress report or POC update only unless otherwise noted.     Palpation: Trigger points on palpation of the left lumbosacral paraspinal/multifidus musculature.      Functional Tests  Outcome  Norms   Timed Up and Go NT NT <13.5 sec   30 second Sit to Stand 9 11 Age Male Female   60-64 <14 <12   65-69 <12 <11   70-74 <12 <10   75-79 <11 <10   80-84 <10 <9   85-89 <8 <8   90-93 <7 <4      Five Time Sit to Stand NT NT 60s: <11.4 sec  70s: <12.6 sec  80s: 14.8 sec   6 minutes walk NT NT 60s: >538f, 572m  70s: >471f, 527m  80s: >417f, 392m        Treatment     Leslie received the treatments listed below:     THERAPEUTIC EXERCISES to develop strength, endurance, ROM, flexibility, posture, and core stabilization for (30) minutes including:    Intervention  Performed Today    nustep x X 8 minutes   Lower trunk rotations x 10 second holds x 10   Pelvic tilts x Anterior /posterior with cueing - 10 second holds x 10   Single knee to chest x 10 second holds x 10   Sidelying hip abduction x 2x10   Prone hip extension x 2x10   Sit to stand x 2x10          Plan for Next Visit:         MANUAL THERAPY TECHNIQUES were applied for (30) minutes, including:    Manual Intervention Performed Today    Soft Tissue Mobilization x  STM bilateral lumbosacral PSM/multifidus and gluteal   Joint Mobilizations     Mobilization with movement          Functional Dry Needling  x FDN performed to the left lumbosacral paraspinal/multifidus     Plan for Next Visit:         Patient Education and Home Exercises       Home Exercises Provided and Patient Education Provided     Education provided: (5) minutes        Written Home Exercises Provided: yes.  Exercises were reviewed and Leslie was able to demonstrate them prior to the end of the session.  Leslie demonstrated fair  understanding of the education provided. See EMR under Patient Instructions for exercises provided during therapy sessions.    Assessment     Patient reports that lower back pain has been decreased.  She also reports of decreased pain at the left knee region.  Will continue to progress into more strengthening and stabilization exercises with continued treatment.    Leslie is progressing well towards her goals.   Patient prognosis is Fair.     Patient will continue to benefit from skilled outpatient physical therapy to address the deficits listed in the problem list box on initial evaluation, provide pt/family education and to maximize patient's level of independence in the home and community environment.     Patient's spiritual, cultural and educational needs considered and pt agreeable to plan of care and goals.     Anticipated Barriers for therapy: Anxiety or Panic Disorders, Arthritis, Back pain, Depression, Headaches, High  Blood  Pressure, Kidney, Bladder, Prostate or Urination Problems, Osteoporosis, Prior Surgery, Stroke or TIA, Visual Impairment      Goals: Reviewed:12/18/2023    Short Term Goals: In 4 weeks   1.Patient to be educated on HEP. - met  2. Patient  to increase lumbar spine ROM to B SB 30 deg, B Rot 75% - PC  3. Patient  to increase hip PROM to 60 deg ER bilaterally, IR 20 deg bilaterally, in order to assist with more normalized gait pattern. - met  4. Patient  to increase B LE and core strength to 4-/5 in order to improve gait and balance.  - PC  5. Patient to increased right knee extn ROM to -5 deg for improved gait mechanics- PC  6. Patient   to have pain less than 2/10 at worst, to improve QOL. - PC  7. Patient  to improve score on the FOTO, to improve QOL.- PC  8. Patient  to be educated on postural/body mechanics awareness. - PC     Long Term Goals: In 8 weeks  1.Patient to improve score on the FOTO to 48 or better, to improve QOL. - PC  2. Patient to demo increase in LE strength to 4/5, in order to improve endurance and increase ability to ambulate for increased time. - PC  3. Patient to have decreased pain to 2/10 at worst, to improve QOL. - met  4. Patient to demo increase lumbar ROM to ,  SB 30 deg, B Rot 90%in order to improve available range of motion for ADL's. - PC  .  5. Patient  to increase hip PROM to Extn +5 deg,  60 deg ER bilaterally, IR 20 deg bilaterally, in order to assist with more normalized gait pattern. - PC  6. Patient to increased right knee extn ROM to -5 deg for improved gait mechanics - PC  6. Patient to perform daily activities without increased symptoms including:  Prolonged walking x 10 minutes. - met  Ascending.descending 6 inch step without upper extremities assistance - PC  Return to gym and work outings 2 times per week with family transportation - met         Plan     Continue Plan of Care (POC) and progress per patient tolerance. See treatment section for details on planned progressions  next session.      Leoncio Lujan, PT

## 2023-12-18 NOTE — PLAN OF CARE
[facility]   Physical Therapy Treatment Note        Name: Leslie CASTELLON [facility] Number: [id number]    Therapy Diagnosis:   No diagnosis found.    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    He/She was partially compliant with home exercise program.  Response to previous treatment: some pain relief  Functional change: Some increased speed with walking    Pain: 5/10     Location: lumbar paraspinal region    Objective      Objective Measures updated at progress report or POC update only unless otherwise noted.     Palpation: Trigger points on palpation of the left lumbosacral paraspinal/multifidus musculature.      Functional Tests  Outcome  Norms   Timed Up and Go NT NT <13.5 sec   30 second Sit to Stand 9 11 Age Male Female   60-64 <14 <12   65-69 <12 <11   70-74 <12 <10   75-79 <11 <10   80-84 <10 <9   85-89 <8 <8   90-93 <7 <4      Five Time Sit to Stand NT NT 60s: <11.4 sec  70s: <12.6 sec  80s: 14.8 sec   6 minutes walk NT NT 60s: >538f, 572m  70s: >471f, 527m  80s: >417f, 392m        Treatment     Leslie received the treatments listed below:     THERAPEUTIC EXERCISES to develop strength, endurance, ROM, flexibility, posture, and core stabilization for (30) minutes including:    Intervention  Performed Today    nustep x X 8 minutes   Lower trunk rotations x 10 second holds x 10   Pelvic tilts x Anterior /posterior with cueing - 10 second holds x 10   Single knee to chest x 10 second holds x 10   Sidelying hip abduction x 2x10   Prone hip extension x 2x10   Sit to stand x 2x10          Plan for Next Visit:         MANUAL THERAPY TECHNIQUES were applied for (30) minutes, including:    Manual Intervention Performed Today    Soft Tissue Mobilization x  STM bilateral lumbosacral PSM/multifidus and gluteal   Joint Mobilizations     Mobilization with movement          Functional Dry Needling  x FDN performed to the left lumbosacral paraspinal/multifidus     Plan for Next Visit:         Patient Education and Home Exercises       Home Exercises Provided and Patient Education Provided     Education provided: (5) minutes        Written Home Exercises Provided: yes.  Exercises were reviewed and Leslie was able to demonstrate them prior to the end of the session.  Leslie demonstrated fair  understanding of the education provided. See EMR under Patient Instructions for exercises provided during therapy sessions.    Assessment     Patient reports that lower back pain has been decreased.  She also reports of decreased pain at the left knee region.  Will continue to progress into more strengthening and stabilization exercises with continued treatment.    Leslie is progressing well towards her goals.   Patient prognosis is Fair.     Patient will continue to benefit from skilled outpatient physical therapy to address the deficits listed in the problem list box on initial evaluation, provide pt/family education and to maximize patient's level of independence in the home and community environment.     Patient's spiritual, cultural and educational needs considered and pt agreeable to plan of care and goals.     Anticipated Barriers for therapy: Anxiety or Panic Disorders, Arthritis, Back pain, Depression, Headaches, High  Blood  Pressure, Kidney, Bladder, Prostate or Urination Problems, Osteoporosis, Prior Surgery, Stroke or TIA, Visual Impairment      Goals: Reviewed:12/16/2023    Short Term Goals: In 4 weeks   1.Patient to be educated on HEP. - met  2. Patient  to increase lumbar spine ROM to B SB 30 deg, B Rot 75% - PC  3. Patient  to increase hip PROM to 60 deg ER bilaterally, IR 20 deg bilaterally, in order to assist with more normalized gait pattern. - met  4. Patient  to increase B LE and core strength to 4-/5 in order to improve gait and balance.  - PC  5. Patient to increased right knee extn ROM to -5 deg for improved gait mechanics- PC  6. Patient   to have pain less than 2/10 at worst, to improve QOL. - PC  7. Patient  to improve score on the FOTO, to improve QOL.- PC  8. Patient  to be educated on postural/body mechanics awareness. - PC     Long Term Goals: In 8 weeks  1.Patient to improve score on the FOTO to 48 or better, to improve QOL. - PC  2. Patient to demo increase in LE strength to 4/5, in order to improve endurance and increase ability to ambulate for increased time. - PC  3. Patient to have decreased pain to 2/10 at worst, to improve QOL. - met  4. Patient to demo increase lumbar ROM to ,  SB 30 deg, B Rot 90%in order to improve available range of motion for ADL's. - PC  .  5. Patient  to increase hip PROM to Extn +5 deg,  60 deg ER bilaterally, IR 20 deg bilaterally, in order to assist with more normalized gait pattern. - PC  6. Patient to increased right knee extn ROM to -5 deg for improved gait mechanics - PC  6. Patient to perform daily activities without increased symptoms including:  Prolonged walking x 10 minutes. - met  Ascending.descending 6 inch step without upper extremities assistance - PC  Return to gym and work outings 2 times per week with family transportation - met         Plan     Continue Plan of Care (POC) and progress per patient tolerance. See treatment section for details on planned progressions  next session.      Leoncio Lujan, PT

## 2023-12-18 NOTE — PROGRESS NOTES
[facility]  Speech Therapy Treatment Note- Neurological Rehabilitation  Date: 12/18/2023     Name: Leslie Wood   MRN: [id number]   Therapy Diagnosis:   Encounter Diagnosis   Name Primary?    Cognitive communication disorder Yes   Physician: Marily Millan NP  Physician Orders: Ambulatory Referral to Speech Therapy   Medical Diagnosis: Disorientation [R41.0], Aphasia [R47.01]     Visit #/ Visits Authorized: 5/ 16 (+eval)  Date of Evaluation:  11/8/23  Insurance Authorization Period: 11/15/2023 - 12/31/2023   Plan of Care Expiration Date:    1/31/24  Extended Plan of Care:  -   Progress Note: 12/8/23     Time In:  9:56 AM  Time Out:  10:46 AM  Total Billable Time: 50 mins      Precautions: Standard, Fall, Cognition, and Communication  Subjective:   Patient reports: she is doing well and feeling pretty good this weekend.   She was compliant to home exercise program.     Response to previous treatment: NA    Pain Scale: 9/10 on a Visual Analog Scale currently.  Pain Location: back and leg  Objective:   TIMED  Procedure Min.   Cognitive Therapeutic Interventions, first 15 minutes CPT 41205  15   Cognitive Therapeutic Interventions, each additional 15 minutes CPT 72591  35           Short Term Goals: (6 weeks) Current Progress:   Patient will sustain attention to a simple task (auditory or visual) for 3 minutes with 90% accuracy or no more than 2 redirections.      Goal Met 12/13/2023   Auditory: met x2  Visual: met x7 Auditory Sustained Attention:  Several sustained auditory attention tasks completed in today's session. Patient listened to multi-step commands and followed with 91% accuracy. She required initial verbal cueing for use of strategies including taking requesting repetition as needed. Improved use of strategies independently with overall improved accuracy in today's session.    Visual Sustained Attention:  Patient completed several visual sustained attention tasks in today's session in  which she visually scanned for letters/numbers in a paragraph.   Trial 1: 1:30, 100% accuracy, 1/7 relative scale of cognitive load   Trial 2: 1:45, 100% accuracy, 1/7 relative scale of cognitive load   Trial 3: 1:30, 100% accuracy, 1/7 relative scale of cognitive load    Trial 4: 2:08, -7 (missed entire row of scanning)   Trial 5: 2:10, 100% accuracy, 1/7 relative scale of cognitive load   Trial 6: 1:30, 100% accuracy, 1/7 relative scale of cognitive load     Overall improved use of strategies to facilitate visual scanning pattern as well as good awareness of errors. Overall high accuracy of completion with low cognitive load.       2. Patient will complete selective attention tasks (auditory or visual) with 90% accuracy independently increase selective attention.      Progressing/ Not Met 12/18/2023   Auditory: met x1 Auditory Selective Attention:  Patient listened for and followed complex 2 step commands in a dynamic environment with auditory and visual distractions during completion. On first attempt she followed commands with 73% accuracy and minimal-moderate verbal cueing for use of strategies. On second attempt, discussion of strategies prior to task completion including use of strategies to facilitate attention and clarify even if she was relatively sure of task at hand. Discussion of how this relates to conversation and asking clarifying questions to facilitate attention and information processing. She achieved 88% accuracy for second attempt, rating task as 3.5/7 on relative scale of cognitive load.    Visual Selective Attention:  Patient visually scanned for letters of the alphabet in order in a jumbled paragraph of letters. She initially began on correct track, but required verbal cueing to start over and recheck her errors detention through task completion to re-focus visual attention. She achieved 85% accuracy with moderate cueing for use of strategies such as covering/revealing 1 line at a time and  using pen or finger as a guide. Will continue to address in next session.    3. Patient will use attention shifting strategies to shift attention between two tasks (auditory or visual) with no more than 3 cues or 90% accuracy to improve alternating attention.      Progressing/ Not Met 2023  Not formally addressed    4. Patient will recall functional information (name, birthday, address, etc) following delays of 2-3 minutes following a prompt questions across 3 therapy sessions with 80% accuracy (spaced retrieval).      Progressing/ Not Met 2023   Not formally addressed     5. Patient will complete a functional task to improve attention, memory and/or executive functions (I.e. sample bill paying activity, recipe, or multiple choice comprehension questions to 1 paragraphs) with 80% accuracy and natural environment noise distractions (TV news background, music, etc.).      Progressing/ Not Met 2023    Not formally addressed     6. Patient will be educated regarding cognitive deficits as applicable to the patient's life for increased awareness and will execute returned demonstration of information with 80% accuracy.       Progressing/ Not Met 2023  Not formally addressed     7. Patient will participate in continued cognitive assessment of right hemisphere dysfunction.     Goal Met 2023   See results of RICE below.      Patient Education/Response:   Patient educated regarding the followin. Cognitive triangle as it results to assessment findings/impact on functioning   2. Impact of specific right hemisphere dysfunction on reading, inattention/left neglect, ability to complete daily tasks     Home program established: Patient instructed to continue prior program  Patient verbalized understanding to all above education provided.     See Electronic Medical Record under Patient Instructions for exercises provided throughout therapy.  Assessment:   Progress toward increasingly complex visual  and auditory selective attention tasks in today's session. Improving awareness of deficits and therefore independent use of strategies for auditory selective attention tasks. She required moderate cueing to re-focus attention for visual attention tasks. Will continue to progress per patient tolerance in next session.     Leslie is progressing well towards her goals. Current goals remain appropriate. Goals to be updated as necessary.     Patient prognosis is Fair. Patient will continue to benefit from skilled outpatient speech and language therapy to address the deficits listed in the problem list on initial evaluation, provide patient/family education and to maximize patient's level of independence in the home and community environment.     Medical necessity is demonstrated by the following IMPAIRMENTS:  Cognition: Deficits in executive functioning, attention, and memory prevent the pt from relaying medically and safety relevant information in a timely manner in a state of emergency.     Barriers to Therapy: NA  Patient's spiritual, cultural and educational needs considered and patient agreeable to plan of care and goals.  Plan:   Continue Plan of Care with focus on rehabilitation and compensation for attention, memory, and executive functioning deficits impacting safety and efficiency of daily task completion.     Rabia Lane MA, CCC-SLP  Speech Language Pathologist  12/18/2023

## 2023-12-20 ENCOUNTER — CLINICAL SUPPORT (OUTPATIENT)
Dept: REHABILITATION | Facility: HOSPITAL | Age: 83
End: 2023-12-20
Payer: MEDICARE

## 2023-12-20 DIAGNOSIS — R41.841 COGNITIVE COMMUNICATION DISORDER: Primary | ICD-10-CM

## 2023-12-20 PROCEDURE — 97129 THER IVNTJ 1ST 15 MIN: CPT | Mod: KX | Performed by: SPEECH-LANGUAGE PATHOLOGIST

## 2023-12-20 PROCEDURE — 97130 THER IVNTJ EA ADDL 15 MIN: CPT | Mod: KX | Performed by: SPEECH-LANGUAGE PATHOLOGIST

## 2023-12-20 NOTE — PROGRESS NOTES
[facility]  Speech Therapy Treatment Note- Neurological Rehabilitation  Date: 12/20/2023     Name: Leslie Wood   MRN: [id number]   Therapy Diagnosis:   Encounter Diagnosis   Name Primary?    Cognitive communication disorder Yes   Physician: Marily Millan NP  Physician Orders: Ambulatory Referral to Speech Therapy   Medical Diagnosis: Disorientation [R41.0], Aphasia [R47.01]     Visit #/ Visits Authorized: 6/ 16 (+eval)  Date of Evaluation:  11/8/23  Insurance Authorization Period: 11/15/2023 - 12/31/2023   Plan of Care Expiration Date:    1/31/24  Extended Plan of Care:  -   Progress Note: 12/8/23     Time In:  9:15 AM  Time Out:  10:00 AM  Total Billable Time: 45 mins      Precautions: Standard, Fall, Cognition, and Communication  Subjective:   Patient reports: she is doing okay, but has increased back pain this morning.   She was compliant to home exercise program.     Response to previous treatment: NA    Pain Scale: 9/10 on a Visual Analog Scale currently.  Pain Location: back and leg  Objective:   TIMED  Procedure Min.   Cognitive Therapeutic Interventions, first 15 minutes CPT 51664  15   Cognitive Therapeutic Interventions, each additional 15 minutes CPT 55317  30           Short Term Goals: (6 weeks) Current Progress:   Patient will sustain attention to a simple task (auditory or visual) for 3 minutes with 90% accuracy or no more than 2 redirections.      Goal Met 12/13/2023   Auditory: met x2  Visual: met x7 Auditory Sustained Attention:  Several sustained auditory attention tasks completed in today's session. Patient listened to multi-step commands and followed with 91% accuracy. She required initial verbal cueing for use of strategies including taking requesting repetition as needed. Improved use of strategies independently with overall improved accuracy in today's session.    Visual Sustained Attention:  Patient completed several visual sustained attention tasks in today's session in  which she visually scanned for letters/numbers in a paragraph.   Trial 1: 1:30, 100% accuracy, 1/7 relative scale of cognitive load   Trial 2: 1:45, 100% accuracy, 1/7 relative scale of cognitive load   Trial 3: 1:30, 100% accuracy, 1/7 relative scale of cognitive load    Trial 4: 2:08, -7 (missed entire row of scanning)   Trial 5: 2:10, 100% accuracy, 1/7 relative scale of cognitive load   Trial 6: 1:30, 100% accuracy, 1/7 relative scale of cognitive load     Overall improved use of strategies to facilitate visual scanning pattern as well as good awareness of errors. Overall high accuracy of completion with low cognitive load.       2. Patient will complete selective attention tasks (auditory or visual) with 85% accuracy independently increase selective attention.      Progressing/ Not Met 12/20/2023   Auditory: met x3 Auditory Selective Attention:  Patient listened for and followed complex 2 step commands in a dynamic environment with auditory and visual distractions during completion. On first attempt she followed commands with 88% accuracy and initial verbal cueing for use of strategies. On second attempt, discussion of strategies prior to task completion including use of strategies to facilitate attention and clarify even if she was relatively sure of task at hand. Discussion of how this relates to conversation and asking clarifying questions to facilitate attention and information processing. She achieved 90% accuracy for second attempt, rating task as 2/7 on relative scale of cognitive load.    She then listened to a 5 minute video about how artificial radha trees are made. She did not use strategies to pause or take notes and did not answer any initial questions accurately. After cues to rewind/repeat and take notes on second presentation, patient then answered questions about video with 80% accuracy and rated task as 3.5/7 on relative scale of cognitive load.    Visual Selective Attention:  Not formally  addressed     3. Patient will use attention shifting strategies to shift attention between two tasks (auditory or visual) with no more than 3 cues or 90% accuracy to improve alternating attention.      Progressing/ Not Met 2023  Not formally addressed    4. Patient will recall functional information (name, birthday, address, etc) following delays of 2-3 minutes following a prompt questions across 3 therapy sessions with 80% accuracy (spaced retrieval).      Progressing/ Not Met 2023   Not formally addressed     5. Patient will complete a functional task to improve attention, memory and/or executive functions (I.e. sample bill paying activity, recipe, or multiple choice comprehension questions to 1 paragraphs) with 80% accuracy and natural environment noise distractions (TV news background, music, etc.).      Progressing/ Not Met 2023    Not formally addressed     6. Patient will be educated regarding cognitive deficits as applicable to the patient's life for increased awareness and will execute returned demonstration of information with 80% accuracy.       Progressing/ Not Met 2023  Not formally addressed     7. Patient will participate in continued cognitive assessment of right hemisphere dysfunction.     Goal Met 2023   See results of RICE below.      Patient Education/Response:   Patient educated regarding the followin. Cognitive triangle as it results to assessment findings/impact on functioning   2. Impact of specific right hemisphere dysfunction on reading, inattention/left neglect, ability to complete daily tasks     Home program established: Patient instructed to continue prior program  Patient verbalized understanding to all above education provided.     See Electronic Medical Record under Patient Instructions for exercises provided throughout therapy.  Assessment:   Progress toward increasingly complex auditory selective attention tasks in today's session. Improving  "awareness of deficits and therefore independent use of strategies for auditory selective attention tasks; however, when task increased in complexity, increased cueing for use of strategies to facilitate attention. Overall higher accuracy and awareness for use of strategies in today's session. At the end of session, patient commented, "my memory is definitely improving".     Leslie is progressing well towards her goals. Current goals remain appropriate. Goals to be updated as necessary.     Patient prognosis is Fair. Patient will continue to benefit from skilled outpatient speech and language therapy to address the deficits listed in the problem list on initial evaluation, provide patient/family education and to maximize patient's level of independence in the home and community environment.     Medical necessity is demonstrated by the following IMPAIRMENTS:  Cognition: Deficits in executive functioning, attention, and memory prevent the pt from relaying medically and safety relevant information in a timely manner in a state of emergency.     Barriers to Therapy: NA  Patient's spiritual, cultural and educational needs considered and patient agreeable to plan of care and goals.  Plan:   Continue Plan of Care with focus on rehabilitation and compensation for attention, memory, and executive functioning deficits impacting safety and efficiency of daily task completion.     Rabia Lane MA, CCC-SLP  Speech Language Pathologist  12/20/2023     "

## 2023-12-27 ENCOUNTER — CLINICAL SUPPORT (OUTPATIENT)
Dept: REHABILITATION | Facility: HOSPITAL | Age: 83
End: 2023-12-27
Payer: MEDICARE

## 2023-12-27 DIAGNOSIS — I69.354 HEMIPLEGIA AND HEMIPARESIS FOLLOWING CEREBRAL INFARCTION AFFECTING LEFT NON-DOMINANT SIDE: ICD-10-CM

## 2023-12-27 DIAGNOSIS — R29.898 DECREASED GRIP STRENGTH OF LEFT HAND: ICD-10-CM

## 2023-12-27 DIAGNOSIS — R68.89 DECREASED STRENGTH, ENDURANCE, AND MOBILITY: ICD-10-CM

## 2023-12-27 DIAGNOSIS — Z74.09 DECREASED STRENGTH, ENDURANCE, AND MOBILITY: ICD-10-CM

## 2023-12-27 DIAGNOSIS — R53.1 DECREASED STRENGTH, ENDURANCE, AND MOBILITY: ICD-10-CM

## 2023-12-27 DIAGNOSIS — R29.898 FINE MOTOR IMPAIRMENT: ICD-10-CM

## 2023-12-27 DIAGNOSIS — Z78.9 DECREASED ACTIVITIES OF DAILY LIVING (ADL): ICD-10-CM

## 2023-12-27 DIAGNOSIS — I69.30 LATE EFFECT OF CEREBROVASCULAR ACCIDENT (CVA): Primary | ICD-10-CM

## 2023-12-27 DIAGNOSIS — R29.818 FINE MOTOR IMPAIRMENT: ICD-10-CM

## 2023-12-27 PROCEDURE — 97110 THERAPEUTIC EXERCISES: CPT

## 2023-12-27 PROCEDURE — 97112 NEUROMUSCULAR REEDUCATION: CPT

## 2023-12-27 PROCEDURE — 97530 THERAPEUTIC ACTIVITIES: CPT

## 2023-12-27 NOTE — PROGRESS NOTES
[facility]  Occupational Therapy Treatment Note     Date: 12/27/2023  Name: Leslie [facility] Number: [id number]    Therapy Diagnosis:     Encounter Diagnoses   Name Primary?    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     Decreased  strength of left hand     Decreased strength, endurance, and mobility     Decreased activities of daily living (ADL)     Fine motor impairment          Physician: Marily Millan NP    Physician Orders: Occupational Therapy to Evaluate and Treat   Medical Diagnosis: R29.898 (ICD-10-CM) - Poor fine motor skills  Surgical Procedure and Date: N/A     Evaluation Date: 11/8/2023  Insurance Authorization Period Expiration: 11/8/2023 - 12/31/2023  Plan of Care Certification Period: 11/8/2023 - 02/08/2024  Progress Note Due: 1/11/2024     Date of Return to MD: N/A     Visit # / Visits authorized: 4/25  FOTO: 1/3     Precautions:  Standard    Time In: 10:00  Time Out: 10:45  Total Treatment Time: 45 minutes  Total Billable Time: 45 minutes    Subjective     Patient reports: She is feeling much better.    she was compliant with home exercise program given last session.   Response to previous treatment: Tolerated well  Functional change: Improved knowledge of HEP    Pain: 6/10  Location: bilateral hands     Objective     Please see progress note dated 12/11/2023 for updated objective measures    Treatment     Supervised Modalities: Modalities such as hot/cold packs, traction, unattended electrical stimulation, paraffin bath, fluidotherapy to reduce pain and increase soft tissue extensibility. Leslie received (0) minutes of modalities listed below after being cleared for contraindications.  x = modalities performed     Supervised Modalities 12/27/2023                            Manual Therapy Techniques: Joint mobilizations, Soft tissue Mobilization, and mobilization with  movement applied to the area listed below. Leslie received (0) minutes of interventions. x = intervention performed today    Manual Intervention 12/27/2023    Soft Tissue Mobilization     Joint Mobilizations     Mobilization with movement              Therapeutic Exercises: to develop strength, endurance, ROM, flexibility, posture and core stabilization. Leslie received (15) minutes of exercises listed below. x = performed today * = level of progression of activity     Therapeutic Exercise 12/27/2023    Interossei  -green foam  -rubber bands x 20x bilateral    Digi flexion - red x 20x bilateral    Resistive pinch - red  -2pt  -3pt  -lateral x 20x bilateral    Thumb palmar   -adduction  -abduction x 20x bilateral    Thumb radial  -adduction  -abduction x 20x bilateral    Forearm exercise  - wrist flexion  -wrist extension  - supination  -pronation x 2lb        Therapeutic Activities: Everyday tasks to address the combined need of improved strength, range of motion, and endurance to achieve increased independence. While simulating these activities, the person is applying the gained skills of strength and improved range of motion in a practical way.  Leslie received (15) minutes of activities listed below. x = activities performed today * = level of progression of activity     Therapeutic Activities 12/27/2023    Velcro board  -lateral pinch  - dowel  x 20x bilateral    Opening and closing containers x 5 minutes, ergonomics               Neuromuscular Re-education: the use of functional strengthening, stretching, balancing and coordination activities that train the nerves and muscles to react and communicate to restore normal body movement patterns to increase self care independence. Leslie received (15) minutes of interventions listed below.  x = interventions performed today * = level of progression of activity     Neuromuscular Re-education 12/27/2023    Finger opposition  x 20x   Finger taps (extension) x 20x   Finger  opposition with slides x    Table Top active assist range of motion  - shoulder flexion  - shoulder abduction  x 5x - 15 second holds     Self Care: Fundamental skills required to independently care for oneself. Activities of daily living such as eating, bathing, dressing, toileting, grooming, sleeping, transfers and mobility. Instrumental activities of daily living such as driving, medication management, pet care, home management/cleaning, financial management, using the phone, meal preparation and shopping. Leslie received (0) minutes of interventions listed below.  x = interventions performed * = level of progression of activity     Self Care 12/27/2023                            Home Exercises and Education Provided     Education provided:   - home exercise program education provided  - progress towards goals     Written Home Exercises Provided: Patient instructed to cont prior HEP.  Exercises were reviewed and Leslie was able to demonstrate them prior to the end of the session. Leslie demonstrated good understanding of the home exercise program provided.     See EMR under Patient Instructions for exercises provided prior visit.        Assessment     Patient tolerated exercises well. Needs verbal cues for hand placement.     Leslie is progressing towards her goals and there are no updates to goals at this time. Patient prognosis is Good.     Patient will continue to benefit from skilled outpatient occupational therapy to address the deficits listed in the problem list on initial evaluation provide patient/family education and to maximize patient's level of independence in the home and community environment.     Patient's spiritual, cultural and educational needs considered and patient agreeable to plan of care and goals.    Anticipated barriers to occupational therapy: Cognition, home exercise program compliance    Goals:     Short Term Goals:  6 weeks  Progress    Pain: Patient will demonstrate improved pain by reports  of less than or equal to 7/10 worst pain on the verbal rating scale in order to progress toward maximal functional ability and improve quality of life. PC   Function: Patient will demonstrate improved function as indicated by a functional limitation score of less than or equal to 46 out of 100 on FOTO. PC   Mobility: Patient will improve active range of motion to 50% of stated goals, listed in objective measures above, in order to progress towards independence with functional activities.  PC   Strength: Patient will improve strength to 50% of stated goals, listed in objective measures above, in order to progress towards independence with functional activities.  PC   HEP: Patient will demonstrate independence with home exercise program in order to progress toward functional independence. PC      Long Term Goals:  12 weeks  Progress   Pain: Patient will demonstrate improved pain by reports of less than or equal to 6/10 worst pain on the verbal rating scale in order to progress toward maximal functional ability and improve quality of life.   PC   Function: Patient will demonstrate improved function as indicated by a functional limitation score of less than or equal to 48 out of 100 on FOTO. PC   Mobility: Patient will improve active range of motion to stated goals, listed in objective measures above, in order to return to maximal functional potential and improve quality of life. PC   Strength: Patient will improve strength to stated goals, listed in objective measures above, in order to improve functional independence and quality of life. PC   Patient will return to normal ADL's, IADL's, community involvement, recreational activities, and work-related activities with less than or equal to 5/10 pain and maximal function.  PC      Goals Key:  PC= Progressing/Continue;       PM= Partially Met;       GM= Goal Met,      DC= Discontinue    Plan     Continue Plan of Care (POC) and progress per patient tolerance.      Ivana  Cleo, OT  ,OTD, OTR/L

## 2023-12-31 ENCOUNTER — EXTERNAL CHRONIC CARE MANAGEMENT (OUTPATIENT)
Dept: PRIMARY CARE CLINIC | Facility: CLINIC | Age: 83
End: 2023-12-31
Payer: MEDICARE

## 2023-12-31 PROCEDURE — 99487 CPLX CHRNC CARE 1ST 60 MIN: CPT | Mod: PBBFAC,25 | Performed by: FAMILY MEDICINE

## 2023-12-31 PROCEDURE — 99489 CPLX CHRNC CARE EA ADDL 30: CPT | Mod: PBBFAC,27 | Performed by: FAMILY MEDICINE

## 2023-12-31 PROCEDURE — 99487 CPLX CHRNC CARE 1ST 60 MIN: CPT | Mod: S$PBB,,, | Performed by: FAMILY MEDICINE

## 2023-12-31 PROCEDURE — 99489 CPLX CHRNC CARE EA ADDL 30: CPT | Mod: S$PBB,,, | Performed by: FAMILY MEDICINE

## 2024-01-02 ENCOUNTER — TELEPHONE (OUTPATIENT)
Dept: INTERNAL MEDICINE | Facility: CLINIC | Age: 84
End: 2024-01-02
Payer: MEDICARE

## 2024-01-02 NOTE — TELEPHONE ENCOUNTER
""Hello,     I spoke with the patient today for a monthly check in. Pt notified pt that she has not been taking her   atorvastatin (LIPITOR) 40 MG tablet compliantly. Pt stated is aware to take once daily but has been taking every 3-4 days instead. Pt stated last dose was taken on Wednesday 12/27/23. Pt stated is hesitant to take medication d/t previously experiencing leg cramping while taking Lipitor. Pt denied having any recent leg cramping but also hasn't been taking the Lipitor compliantly. I wanted to notify PCP of this information. Please let me know if I can assist any further. I can be reached via [facility] or number below. Thank you.     Ivon GUERIN LPN   Care Coordinator, [facility]   [phone] ext [phone] "  "

## 2024-01-08 ENCOUNTER — CLINICAL SUPPORT (OUTPATIENT)
Dept: REHABILITATION | Facility: HOSPITAL | Age: 84
End: 2024-01-08
Payer: MEDICARE

## 2024-01-08 DIAGNOSIS — R53.1 DECREASED STRENGTH, ENDURANCE, AND MOBILITY: ICD-10-CM

## 2024-01-08 DIAGNOSIS — M46.1 SACROILIITIS, NOT ELSEWHERE CLASSIFIED: Primary | ICD-10-CM

## 2024-01-08 DIAGNOSIS — R68.89 DECREASED STRENGTH, ENDURANCE, AND MOBILITY: ICD-10-CM

## 2024-01-08 DIAGNOSIS — R26.9 GAIT ABNORMALITY: ICD-10-CM

## 2024-01-08 DIAGNOSIS — Z74.09 DECREASED STRENGTH, ENDURANCE, AND MOBILITY: ICD-10-CM

## 2024-01-08 PROCEDURE — 97140 MANUAL THERAPY 1/> REGIONS: CPT

## 2024-01-08 PROCEDURE — 97112 NEUROMUSCULAR REEDUCATION: CPT

## 2024-01-08 PROCEDURE — 97110 THERAPEUTIC EXERCISES: CPT

## 2024-01-10 ENCOUNTER — OFFICE VISIT (OUTPATIENT)
Dept: URGENT CARE | Facility: CLINIC | Age: 84
End: 2024-01-10
Payer: MEDICARE

## 2024-01-10 ENCOUNTER — CLINICAL SUPPORT (OUTPATIENT)
Dept: REHABILITATION | Facility: HOSPITAL | Age: 84
End: 2024-01-10
Payer: MEDICARE

## 2024-01-10 ENCOUNTER — HOSPITAL ENCOUNTER (OUTPATIENT)
Dept: RADIOLOGY | Facility: CLINIC | Age: 84
Discharge: HOME | End: 2024-01-10
Attending: PHYSICIAN ASSISTANT
Payer: MEDICARE

## 2024-01-10 ENCOUNTER — TELEPHONE (OUTPATIENT)
Dept: URGENT CARE | Facility: CLINIC | Age: 84
End: 2024-01-10
Payer: MEDICARE

## 2024-01-10 VITALS
DIASTOLIC BLOOD PRESSURE: 73 MMHG | TEMPERATURE: 98 F | OXYGEN SATURATION: 97 % | HEART RATE: 64 BPM | SYSTOLIC BLOOD PRESSURE: 158 MMHG | HEIGHT: 61 IN | BODY MASS INDEX: 25.49 KG/M2 | WEIGHT: 135 LBS | RESPIRATION RATE: 18 BRPM
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
## 2024-01-10 DIAGNOSIS — R29.898 DECREASED GRIP STRENGTH OF LEFT HAND: ICD-10-CM
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
## 2024-01-10 DIAGNOSIS — M54.42 ACUTE LEFT-SIDED LOW BACK PAIN WITH LEFT-SIDED SCIATICA: ICD-10-CM

## 2024-01-10 DIAGNOSIS — Z74.09 DECREASED STRENGTH, ENDURANCE, AND MOBILITY: ICD-10-CM

## 2024-01-10 DIAGNOSIS — R41.841 COGNITIVE COMMUNICATION DISORDER: Primary | ICD-10-CM

## 2024-01-10 DIAGNOSIS — R29.818 FINE MOTOR IMPAIRMENT: ICD-10-CM

## 2024-01-10 DIAGNOSIS — R53.1 DECREASED STRENGTH, ENDURANCE, AND MOBILITY: ICD-10-CM
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
## 2024-01-10 DIAGNOSIS — M25.551 ACUTE RIGHT HIP PAIN: ICD-10-CM

## 2024-01-10 DIAGNOSIS — R68.89 DECREASED STRENGTH, ENDURANCE, AND MOBILITY: ICD-10-CM

## 2024-01-10 DIAGNOSIS — R29.898 FINE MOTOR IMPAIRMENT: ICD-10-CM

## 2024-01-10 DIAGNOSIS — I69.354 HEMIPLEGIA AND HEMIPARESIS FOLLOWING CEREBRAL INFARCTION AFFECTING LEFT NON-DOMINANT SIDE: ICD-10-CM

## 2024-01-10 DIAGNOSIS — I69.30 LATE EFFECT OF CEREBROVASCULAR ACCIDENT (CVA): Primary | ICD-10-CM

## 2024-01-10 DIAGNOSIS — Z78.9 DECREASED ACTIVITIES OF DAILY LIVING (ADL): ICD-10-CM

## 2024-01-10 DIAGNOSIS — S61.411A LACERATION OF SKIN OF RIGHT HAND, INITIAL ENCOUNTER: ICD-10-CM

## 2024-01-10 PROCEDURE — 97530 THERAPEUTIC ACTIVITIES: CPT

## 2024-01-10 PROCEDURE — 97112 NEUROMUSCULAR REEDUCATION: CPT

## 2024-01-10 PROCEDURE — 72100 X-RAY EXAM L-S SPINE 2/3 VWS: CPT | Mod: S$GLB,,, | Performed by: RADIOLOGY

## 2024-01-10 PROCEDURE — 73502 X-RAY EXAM HIP UNI 2-3 VIEWS: CPT | Mod: LT,S$GLB,, | Performed by: RADIOLOGY

## 2024-01-10 PROCEDURE — 97129 THER IVNTJ 1ST 15 MIN: CPT | Performed by: SPEECH-LANGUAGE PATHOLOGIST

## 2024-01-10 PROCEDURE — 99214 OFFICE O/P EST MOD 30 MIN: CPT | Mod: S$GLB,,, | Performed by: PHYSICIAN ASSISTANT

## 2024-01-10 PROCEDURE — 97110 THERAPEUTIC EXERCISES: CPT

## 2024-01-10 PROCEDURE — 97130 THER IVNTJ EA ADDL 15 MIN: CPT | Performed by: SPEECH-LANGUAGE PATHOLOGIST

## 2024-01-10 RX ORDER — MUPIROCIN 20 MG/G
OINTMENT TOPICAL 2 TIMES DAILY
Qty: 15 G | Refills: 0 | Status: SHIPPED | OUTPATIENT
Start: 2024-01-10

## 2024-01-10 RX ORDER — AMOXICILLIN AND CLAVULANATE POTASSIUM 875; 125 MG/1; MG/1
1 TABLET, FILM COATED ORAL 2 TIMES DAILY
Qty: 14 TABLET | Refills: 0 | Status: SHIPPED | OUTPATIENT
Start: 2024-01-10 | End: 2024-01-17

## 2024-01-10 RX ORDER — MUPIROCIN 20 MG/G
OINTMENT TOPICAL
Status: COMPLETED | OUTPATIENT
Start: 2024-01-10 | End: 2024-01-10

## 2024-01-10 RX ADMIN — MUPIROCIN: 20 OINTMENT TOPICAL at 11:01

## 2024-01-10 NOTE — PROGRESS NOTES
"Subjective:      Patient ID: Leslie Wood is a 83 y.o. female.    Vitals:  height is 5' 1" (1.549 m) and weight is 61.2 kg (135 lb). Her tympanic temperature is 97.6 °F (36.4 °C). Her blood pressure is 158/73 (abnormal) and her pulse is 64. Her respiration is 18 and oxygen saturation is 97%.     Chief Complaint: Fall    Presents with left low back & hip pain after a fall that occurred today about 1 hr pta. She is accompanied with her granddaughter today who reports falls were not witnessed, but seen on outside camera. Pt was walking to the car and fell backwards on her buttock. Pt states her upper body also fell back after landing but denies any severe head injury or LOC. Grand-daughter states pt was probably alone outside for only a minute; she found the pt laying on the ground conscious.  Pt does have hx of chronic low back pain; hx of spinal fusion.  Denies any headache, dizziness, focal weakness, n/v, vision changes, acute loss of sensation. Pain is constant, but somewhat worse with movement. Using walker today, but usually ambulates without assistance.     Pt also reports cut to her right hand after her dog scratched her last night. Voicing concern for infection, as she has had cellulitis before. Area is slightly swollen and painful. No drainage or fever. Tetanus utd- 2019.    Fall  The accident occurred 3 to 5 days ago. The fall occurred while walking. She fell from a height of 1 to 2 ft. She landed on [place]. There was no blood loss. The point of impact was the right wrist, right elbow, left hip and buttocks. The pain is present in the left hip and back. The pain is at a severity of 6/10. The symptoms are aggravated by ambulation and movement. Pertinent negatives include no bowel incontinence, headaches, loss of consciousness, nausea, numbness, tingling, visual change or vomiting. She has tried nothing for the symptoms.       Constitution: Negative.   HENT: Negative.     Neck: neck negative. "   Cardiovascular: Negative.    Eyes: Negative.    Respiratory: Negative.     Gastrointestinal:  Negative for nausea, vomiting and bowel incontinence.   Genitourinary:  Negative for bladder incontinence.   Musculoskeletal:  Positive for trauma, joint pain, back pain and history of spine disorder. Negative for abnormal ROM of joint.   Skin:  Positive for laceration. Negative for bruising.   Neurological:  Negative for dizziness, light-headedness, passing out, facial drooping, headaches, altered mental status, loss of consciousness, numbness and tingling.   Psychiatric/Behavioral:  Negative for altered mental status.       Objective:     Physical Exam   Constitutional: She is oriented to person, place, and time. She appears well-developed.  Non-toxic appearance. She does not appear ill. No distress.   HENT:   Head: Normocephalic and atraumatic.   Ears:   Right Ear: External ear normal.   Left Ear: External ear normal.   Nose: Nose normal.   Eyes: Conjunctivae and EOM are normal. Pupils are equal, round, and reactive to light. Extraocular movement intact   Neck: Neck supple.   Cardiovascular: Normal rate and regular rhythm.   Pulmonary/Chest: Effort normal and breath sounds normal.   Abdominal: Normal appearance.   Musculoskeletal: Normal range of motion.         General: Normal range of motion.      Left elbow: She exhibits normal range of motion, no swelling and no deformity. No tenderness found.      Right wrist: Normal.      Left wrist: Normal.      Left hip: She exhibits tenderness. She exhibits normal range of motion, normal strength, no bony tenderness and no crepitus.      Lumbar back: She exhibits tenderness (right lumbosacral paraspinal region). She exhibits no bony tenderness, no swelling and no deformity.        Back:       Right hand: She exhibits laceration (approximately 3 cm in length on extensor surface of hand; superficial w/ no active bleeding. There surrounding swelling and erythema (mild)). She  exhibits normal range of motion, normal capillary refill and no deformity. Normal sensation noted. Normal strength noted.      Left upper leg: She exhibits tenderness. She exhibits no swelling and no deformity.      Comments: Full strength in left lower extremity and hip.   Neurological: no focal deficit. She is alert and oriented to person, place, and time. She displays no weakness. No cranial nerve deficit or sensory deficit. Gait (ambulates with walker) abnormal. Coordination normal.   Skin: Skin is warm, dry, not diaphoretic, not pale and no rash. Capillary refill takes less than 2 seconds. Lacerations - upper ext.:  right hand (approximately 3 cm in length on extensor surface of hand; superficial w/ no active bleeding. There surrounding swelling and erythema (mild))  Psychiatric: Her behavior is normal. Mood and thought content normal.   XR LUMBAR SPINE 2 OR 3 VIEWS    Result Date: 1/10/2024  EXAMINATION: XR LUMBAR SPINE 2 OR 3 VIEWS CLINICAL HISTORY: Unspecified fall, initial encounter TECHNIQUE: AP, lateral and spot images were performed of the lumbar spine. COMPARISON: 02/20/2022 FINDINGS: Pedicle screws and fixation rods are seen bilaterally at the L3-4 level within intradiscal spacer present at this level.  There is significant disc space narrowing and spondylosis present at the L2-3 level.  Generalized osteopenia noted.  Spinal electrodes noted with at least 2 of the left road seen terminating at approximately the T11 level.  Surgical clips noted in the right upper quadrant of the abdomen.  There appears to be attempted bony fusion at the L4 through S1 levels.     1.  As above Electronically signed by: Jose L Garibay DO Date:    01/10/2024 Time:    12:53    X-Ray Hip 2 or 3 views Left (with Pelvis when performed)    Result Date: 1/10/2024  EXAMINATION: XR HIP WITH PELVIS WHEN PERFORMED, 2 OR 3 VIEWS LEFT CLINICAL HISTORY: Unspecified fall, initial encounter TECHNIQUE: AP view of the pelvis and frog leg  lateral view of the left hip were performed. COMPARISON: 11/10/2022 FINDINGS: The bony pelvis appears to be intact.  There is evidence for what appears to represent prior abdominal wall hernia repair within the lower abdomen to the right of midline.  There is a generator type device with multiple extra seen projecting into the expected location of the spine with the leads incompletely visualized.  Generalized osteopenia noted.  The left hip joint space appears minimally narrowed with minimal spurring of the superior acetabulum.  Vascular calcifications are present.  No acute fracture or dislocation of the left hip.  No plain film evidence to suggest AVN of the left hip.     As above Electronically signed by: Jose L Garibay,  Date:    01/10/2024 Time:    12:51       Assessment:     1. Fall at home, initial encounter    2. Acute right hip pain    3. Acute left-sided low back pain with left-sided sciatica    4. Dog scratch    5. Laceration of skin of right hand, initial encounter        Plan:     Pt neurologically intact on exam and has full strength in LLE. No acute findings on lumbar and hip imaging. Hardware appears intact. Pt denies any severe head injury or LOC. She is aaox3. Denies any concussive symptoms at this time. Is not on blood thinners but takes daily aspirin.  Patient has tizanidine at home that she takes as needed for her chronic back pain.  Advised that she continue this as needed given recent trauma.  Discussed with patient and her granddaughter that this medication may cause drowsiness and that she should be monitor closely especially while taking the medication given recent fall.  Also advised to monitor closely for any signs of concussion or intracranial injury given recent fall.  ED precautions and red flag warning signs were discussed prior to discharge.  Continue to monitor for infection at site of dog scratch (area cleaned in clinic and dressed with Bactroban and ace wrap.  Fall at home,  initial encounter  -     XR LUMBAR SPINE 2 OR 3 VIEWS; Future; Expected date: 01/10/2024  -     X-Ray Hip 2 or 3 views Left (with Pelvis when performed); Future; Expected date: 01/10/2024    Acute right hip pain  -     X-Ray Hip 2 or 3 views Left (with Pelvis when performed); Future; Expected date: 01/10/2024    Acute left-sided low back pain with left-sided sciatica  -     XR LUMBAR SPINE 2 OR 3 VIEWS; Future; Expected date: 01/10/2024    Dog scratch  -     mupirocin 2 % ointment  -     mupirocin (BACTROBAN) 2 % ointment; Apply topically 2 (two) times daily.  Dispense: 15 g; Refill: 0  -     amoxicillin-clavulanate 875-125mg (AUGMENTIN) 875-125 mg per tablet; Take 1 tablet by mouth 2 (two) times daily. for 7 days  Dispense: 14 tablet; Refill: 0    Laceration of skin of right hand, initial encounter  -     mupirocin 2 % ointment  -     mupirocin (BACTROBAN) 2 % ointment; Apply topically 2 (two) times daily.  Dispense: 15 g; Refill: 0  -     amoxicillin-clavulanate 875-125mg (AUGMENTIN) 875-125 mg per tablet; Take 1 tablet by mouth 2 (two) times daily. for 7 days  Dispense: 14 tablet; Refill: 0

## 2024-01-10 NOTE — PROGRESS NOTES
[facility]  Occupational Therapy Treatment Note     Date: 1/10/2024  Name: Leslie CASTELLON [facility] Number: [id number]    Therapy Diagnosis:     Encounter Diagnoses   Name Primary?    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     Decreased  strength of left hand     Decreased strength, endurance, and mobility     Decreased activities of daily living (ADL)     Fine motor impairment          Physician: Marily Millan NP    Physician Orders: Occupational Therapy to Evaluate and Treat   Medical Diagnosis: R29.898 (ICD-10-CM) - Poor fine motor skills  Surgical Procedure and Date: N/A     Evaluation Date: 11/8/2023  Insurance Authorization Period Expiration: 11/8/2023 - 12/31/2023  Plan of Care Certification Period: 11/8/2023 - 02/08/2024  Progress Note Due: 1/11/2024     Date of Return to MD: N/A     Visit # / Visits authorized: 1/25  FOTO: 1/3     Precautions:  Standard    Time In: 10:00  Time Out: 10:45  Total Treatment Time: 45 minutes  Total Billable Time: 45 minutes    Subjective     Patient reports: She had a fall this morning resulting in a skin abrasion of the elbow and hip pain. Patient has skin laceration on right hand from her dog the day prior that is red and inflamed. Patient advised to go to MD or urgent care to be evaluated.     she was compliant with home exercise program given last session.   Response to previous treatment: Tolerated well  Functional change: Improved knowledge of HEP    Pain: 6/10  Location: bilateral hands     Objective     Please see progress note dated 12/11/2023 for updated objective measures    Treatment     Supervised Modalities: Modalities such as hot/cold packs, traction, unattended electrical stimulation, paraffin bath, fluidotherapy to reduce pain and increase soft tissue extensibility. Leslie received (0) minutes of modalities listed below after being cleared  for contraindications.  x = modalities performed     Supervised Modalities 1/10/2024                            Manual Therapy Techniques: Joint mobilizations, Soft tissue Mobilization, and mobilization with movement applied to the area listed below. Leslie received (0) minutes of interventions. x = intervention performed today    Manual Intervention 1/10/2024    Soft Tissue Mobilization     Joint Mobilizations     Mobilization with movement              Therapeutic Exercises: to develop strength, endurance, ROM, flexibility, posture and core stabilization. Leslie received (15) minutes of exercises listed below. x = performed today * = level of progression of activity     Therapeutic Exercise 1/10/2024    Interossei  -green foam  -rubber bands x 20x bilateral    Digi flexion - red x 20x bilateral    Resistive pinch - red  -2pt  -3pt  -lateral x 20x bilateral    Thumb palmar   -adduction  -abduction x 20x bilateral    Thumb radial  -adduction  -abduction x 20x bilateral    Forearm exercise  - wrist flexion  -wrist extension  - supination  -pronation x 2lb        Therapeutic Activities: Everyday tasks to address the combined need of improved strength, range of motion, and endurance to achieve increased independence. While simulating these activities, the person is applying the gained skills of strength and improved range of motion in a practical way.  Leslie received (15) minutes of activities listed below. x = activities performed today * = level of progression of activity     Therapeutic Activities 1/10/2024    Velcro board  -lateral pinch  - dowel  x 20x bilateral    Opening and closing containers x 5 minutes, ergonomics               Neuromuscular Re-education: the use of functional strengthening, stretching, balancing and coordination activities that train the nerves and muscles to react and communicate to restore normal body movement patterns to increase self care independence. Leslie received (15) minutes of  interventions listed below.  x = interventions performed today * = level of progression of activity     Neuromuscular Re-education 1/10/2024    Finger opposition  x 20x   Finger taps (extension) x 20x   Finger opposition with slides x    Table Top active assist range of motion  - shoulder flexion  - shoulder abduction  x 5x - 15 second holds     Self Care: Fundamental skills required to independently care for oneself. Activities of daily living such as eating, bathing, dressing, toileting, grooming, sleeping, transfers and mobility. Instrumental activities of daily living such as driving, medication management, pet care, home management/cleaning, financial management, using the phone, meal preparation and shopping. Leslie received (0) minutes of interventions listed below.  x = interventions performed * = level of progression of activity     Self Care 1/10/2024                            Home Exercises and Education Provided     Education provided:   - home exercise program education provided  - progress towards goals     Written Home Exercises Provided: Patient instructed to cont prior HEP.  Exercises were reviewed and Leslie was able to demonstrate them prior to the end of the session. Leslie demonstrated good understanding of the home exercise program provided.     See EMR under Patient Instructions for exercises provided prior visit.        Assessment     Patient tolerated exercises well. Needs verbal cues for hand placement.     Leslie is progressing towards her goals and there are no updates to goals at this time. Patient prognosis is Good.     Patient will continue to benefit from skilled outpatient occupational therapy to address the deficits listed in the problem list on initial evaluation provide patient/family education and to maximize patient's level of independence in the home and community environment.     Patient's spiritual, cultural and educational needs considered and patient agreeable to plan of care and  goals.    Anticipated barriers to occupational therapy: Cognition, home exercise program compliance    Goals:     Short Term Goals:  6 weeks  Progress    Pain: Patient will demonstrate improved pain by reports of less than or equal to 7/10 worst pain on the verbal rating scale in order to progress toward maximal functional ability and improve quality of life. PC   Function: Patient will demonstrate improved function as indicated by a functional limitation score of less than or equal to 46 out of 100 on FOTO. PC   Mobility: Patient will improve active range of motion to 50% of stated goals, listed in objective measures above, in order to progress towards independence with functional activities.  PC   Strength: Patient will improve strength to 50% of stated goals, listed in objective measures above, in order to progress towards independence with functional activities.  PC   HEP: Patient will demonstrate independence with home exercise program in order to progress toward functional independence. PC      Long Term Goals:  12 weeks  Progress   Pain: Patient will demonstrate improved pain by reports of less than or equal to 6/10 worst pain on the verbal rating scale in order to progress toward maximal functional ability and improve quality of life.   PC   Function: Patient will demonstrate improved function as indicated by a functional limitation score of less than or equal to 48 out of 100 on FOTO. PC   Mobility: Patient will improve active range of motion to stated goals, listed in objective measures above, in order to return to maximal functional potential and improve quality of life. PC   Strength: Patient will improve strength to stated goals, listed in objective measures above, in order to improve functional independence and quality of life. PC   Patient will return to normal ADL's, IADL's, community involvement, recreational activities, and work-related activities with less than or equal to 5/10 pain and maximal  function.  PC      Goals Key:  PC= Progressing/Continue;       PM= Partially Met;       GM= Goal Met,      DC= Discontinue    Plan     Continue Plan of Care (POC) and progress per patient tolerance.      Ivana Gallo, OT  ,OTD, OTR/L

## 2024-01-10 NOTE — PROGRESS NOTES
[facility]  Speech Therapy Treatment Note- Neurological Rehabilitation  Date: 1/10/2024     Name: Leslie Wood   MRN: [id number]   Therapy Diagnosis:   Encounter Diagnosis   Name Primary?    Cognitive communication disorder Yes   Physician: Marily Millan NP  Physician Orders: Ambulatory Referral to Speech Therapy   Medical Diagnosis: Disorientation [R41.0], Aphasia [R47.01]     Visit #/ Visits Authorized: 1/ 12 (+eval, +5)  Date of Evaluation:  11/8/23  Insurance Authorization Period: 11/15/2023 - 12/31/2023   Plan of Care Expiration Date:    1/31/24  Extended Plan of Care:  -   Progress Note: 12/8/23     Time In:  9:24 AM  Time Out:  10:01 AM  Total Billable Time: 35 mins      Precautions: Standard, Fall, Cognition, and Communication  Subjective:   Patient reports: she is doing okay, but fell this morning and scraped her elbow and fell on her hip with significant pain. She is thinking about getting this checked out with an MD later today.    She was compliant to home exercise program.     Response to previous treatment: NA    Pain Scale: 9/10 on a Visual Analog Scale currently.  Pain Location: back and leg  Objective:   TIMED  Procedure Min.   Cognitive Therapeutic Interventions, first 15 minutes CPT 08305  15   Cognitive Therapeutic Interventions, each additional 15 minutes CPT 35366  20           Short Term Goals: (6 weeks) Current Progress:   Patient will sustain attention to a simple task (auditory or visual) for 3 minutes with 90% accuracy or no more than 2 redirections.      Goal Met 12/13/2023   Auditory: met x2  Visual: met x7 Auditory Sustained Attention:  Several sustained auditory attention tasks completed in today's session. Patient listened to multi-step commands and followed with 91% accuracy. She required initial verbal cueing for use of strategies including taking requesting repetition as needed. Improved use of strategies independently with overall improved accuracy in today's  session.    Visual Sustained Attention:  Patient completed several visual sustained attention tasks in today's session in which she visually scanned for letters/numbers in a paragraph.   Trial 1: 1:30, 100% accuracy, 1/7 relative scale of cognitive load   Trial 2: 1:45, 100% accuracy, 1/7 relative scale of cognitive load   Trial 3: 1:30, 100% accuracy, 1/7 relative scale of cognitive load    Trial 4: 2:08, -7 (missed entire row of scanning)   Trial 5: 2:10, 100% accuracy, 1/7 relative scale of cognitive load   Trial 6: 1:30, 100% accuracy, 1/7 relative scale of cognitive load     Overall improved use of strategies to facilitate visual scanning pattern as well as good awareness of errors. Overall high accuracy of completion with low cognitive load.       2. Patient will complete selective attention tasks (auditory or visual) with 85% accuracy independently increase selective attention.      Progressing/ Not Met 1/10/2024   Auditory: met x5  Visual:  Auditory Selective Attention:  Patient listened for and followed complex 2 step commands in a dynamic environment with auditory and visual distractions during completion. She followed commands with 92% accuracy and initial verbal cueing for use of strategies. Discussion of how this relates to conversation and asking clarifying questions to facilitate attention and information processing. She rated task as 2/7 on relative scale of cognitive load.    She then listened to a 5 minute video about how basketballs are made. She requested to take notes as needed and paused/rewound video as needed. She answered questions about what she heard with 92% accuracy and rated task as 2/7 on relative scale of cognitive load.     Visual Selective Attention:  Not formally addressed       3. Patient will use attention shifting strategies to shift attention between two tasks (auditory or visual) with no more than 3 cues or 90% accuracy to improve alternating attention.      Progressing/ Not  Met 1/10/2024  Not formally addressed    4. Patient will recall functional information (name, birthday, address, etc) following delays of 2-3 minutes following a prompt questions across 3 therapy sessions with 80% accuracy (spaced retrieval).      Progressing/ Not Met 1/10/2024   Not formally addressed     5. Patient will complete a functional task to improve attention, memory and/or executive functions (I.e. sample bill paying activity, recipe, or multiple choice comprehension questions to 1 paragraphs) with 80% accuracy and natural environment noise distractions (TV news background, music, etc.).      Progressing/ Not Met 1/10/2024    Not formally addressed     6. Patient will be educated regarding cognitive deficits as applicable to the patient's life for increased awareness and will execute returned demonstration of information with 80% accuracy.       Progressing/ Not Met 1/10/2024  Not formally addressed     7. Patient will participate in continued cognitive assessment of right hemisphere dysfunction.     Goal Met 2023   See results of RICE below.      Patient Education/Response:   Patient educated regarding the followin. Cognitive triangle as it results to assessment findings/impact on functioning   2. Impact of specific right hemisphere dysfunction on reading, inattention/left neglect, ability to complete daily tasks     Home program established: Patient instructed to continue prior program  Patient verbalized understanding to all above education provided.     See Electronic Medical Record under Patient Instructions for exercises provided throughout therapy.  Assessment:   Progress toward increasingly complex auditory selective attention tasks in today's session. Improving awareness of deficits and therefore independent use of strategies for auditory selective attention tasks. Overall higher accuracy and awareness for use of strategies in today's session. At the end of session, patient commented  on improved attention and memory at home.    Leslie is progressing well towards her goals. Current goals remain appropriate. Goals to be updated as necessary.     Patient prognosis is Fair. Patient will continue to benefit from skilled outpatient speech and language therapy to address the deficits listed in the problem list on initial evaluation, provide patient/family education and to maximize patient's level of independence in the home and community environment.     Medical necessity is demonstrated by the following IMPAIRMENTS:  Cognition: Deficits in executive functioning, attention, and memory prevent the pt from relaying medically and safety relevant information in a timely manner in a state of emergency.     Barriers to Therapy: NA  Patient's spiritual, cultural and educational needs considered and patient agreeable to plan of care and goals.  Plan:   Continue Plan of Care with focus on rehabilitation and compensation for attention, memory, and executive functioning deficits impacting safety and efficiency of daily task completion.     Rabia Lane MA, CCC-SLP  Speech Language Pathologist  1/10/2024

## 2024-01-23 DIAGNOSIS — M25.562 PAIN IN BOTH KNEES, UNSPECIFIED CHRONICITY: Primary | ICD-10-CM

## 2024-01-23 DIAGNOSIS — M25.561 PAIN IN BOTH KNEES, UNSPECIFIED CHRONICITY: Primary | ICD-10-CM

## 2024-01-24 ENCOUNTER — CLINICAL SUPPORT (OUTPATIENT)
Dept: REHABILITATION | Facility: HOSPITAL | Age: 84
End: 2024-01-24
Payer: MEDICARE

## 2024-01-24 DIAGNOSIS — R68.89 DECREASED STRENGTH, ENDURANCE, AND MOBILITY: ICD-10-CM

## 2024-01-24 DIAGNOSIS — I69.30 LATE EFFECT OF CEREBROVASCULAR ACCIDENT (CVA): Primary | ICD-10-CM

## 2024-01-24 DIAGNOSIS — I69.354 HEMIPLEGIA AND HEMIPARESIS FOLLOWING CEREBRAL INFARCTION AFFECTING LEFT NON-DOMINANT SIDE: ICD-10-CM

## 2024-01-24 DIAGNOSIS — R41.841 COGNITIVE COMMUNICATION DISORDER: Primary | ICD-10-CM

## 2024-01-24 DIAGNOSIS — Z74.09 DECREASED STRENGTH, ENDURANCE, AND MOBILITY: ICD-10-CM

## 2024-01-24 DIAGNOSIS — R29.898 FINE MOTOR IMPAIRMENT: ICD-10-CM

## 2024-01-24 DIAGNOSIS — R53.1 DECREASED STRENGTH, ENDURANCE, AND MOBILITY: ICD-10-CM

## 2024-01-24 DIAGNOSIS — R29.898 DECREASED GRIP STRENGTH OF LEFT HAND: ICD-10-CM

## 2024-01-24 DIAGNOSIS — R29.818 FINE MOTOR IMPAIRMENT: ICD-10-CM

## 2024-01-24 DIAGNOSIS — Z78.9 DECREASED ACTIVITIES OF DAILY LIVING (ADL): ICD-10-CM

## 2024-01-24 PROCEDURE — 97129 THER IVNTJ 1ST 15 MIN: CPT | Performed by: SPEECH-LANGUAGE PATHOLOGIST

## 2024-01-24 PROCEDURE — 97530 THERAPEUTIC ACTIVITIES: CPT

## 2024-01-24 PROCEDURE — 97112 NEUROMUSCULAR REEDUCATION: CPT

## 2024-01-24 PROCEDURE — 97130 THER IVNTJ EA ADDL 15 MIN: CPT | Performed by: SPEECH-LANGUAGE PATHOLOGIST

## 2024-01-24 PROCEDURE — 97110 THERAPEUTIC EXERCISES: CPT

## 2024-01-24 NOTE — PROGRESS NOTES
[facility]  Occupational Therapy Treatment Note     Date: 1/24/2024  Name: Leslie CASTELLON [facility] Number: [id number]    Therapy Diagnosis:     Encounter Diagnoses   Name Primary?    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     Decreased  strength of left hand     Decreased strength, endurance, and mobility     Decreased activities of daily living (ADL)     Fine motor impairment          Physician: Marily Millan NP    Physician Orders: Occupational Therapy to Evaluate and Treat   Medical Diagnosis: R29.898 (ICD-10-CM) - Poor fine motor skills  Surgical Procedure and Date: N/A     Evaluation Date: 11/8/2023  Insurance Authorization Period Expiration: 11/8/2023 - 12/31/2023  Plan of Care Certification Period: 11/8/2023 - 02/08/2024  Progress Note Due: 1/11/2024     Date of Return to MD: N/A     Visit # / Visits authorized: 2/25  FOTO: 1/3     Precautions:  Standard    Time In: 10:00  Time Out: 10:45  Total Treatment Time: 45 minutes  Total Billable Time: 45 minutes    Subjective     Patient reports: She is doing well today the cut on her hand is almost healed    she was compliant with home exercise program given last session.   Response to previous treatment: Tolerated well  Functional change: Improved knowledge of HEP    Pain: 6/10  Location: bilateral hands     Objective     Please see progress note dated 12/11/2023 for updated objective measures    Treatment     Supervised Modalities: Modalities such as hot/cold packs, traction, unattended electrical stimulation, paraffin bath, fluidotherapy to reduce pain and increase soft tissue extensibility. Leslie received (0) minutes of modalities listed below after being cleared for contraindications.  x = modalities performed     Supervised Modalities 1/24/2024                            Manual Therapy Techniques: Joint mobilizations, Soft tissue  Mobilization, and mobilization with movement applied to the area listed below. Leslie received (0) minutes of interventions. x = intervention performed today    Manual Intervention 1/24/2024    Soft Tissue Mobilization     Joint Mobilizations     Mobilization with movement              Therapeutic Exercises: to develop strength, endurance, ROM, flexibility, posture and core stabilization. Leslie received (15) minutes of exercises listed below. x = performed today * = level of progression of activity     Therapeutic Exercise 1/24/2024    Interossei  -green foam  -rubber bands x 20x bilateral    Digi flexion - red x 20x bilateral    Resistive pinch - red  -2pt  -3pt  -lateral x 20x bilateral    Thumb palmar   -adduction  -abduction x 20x bilateral    Thumb radial  -adduction  -abduction x 20x bilateral    Forearm exercise  - wrist flexion  -wrist extension  - supination  -pronation x 2lb        Therapeutic Activities: Everyday tasks to address the combined need of improved strength, range of motion, and endurance to achieve increased independence. While simulating these activities, the person is applying the gained skills of strength and improved range of motion in a practical way.  Leslie received (15) minutes of activities listed below. x = activities performed today * = level of progression of activity     Therapeutic Activities 1/24/2024    Velcro board  -lateral pinch  - dowel  x 20x bilateral    Opening and closing containers x 5 minutes, ergonomics               Neuromuscular Re-education: the use of functional strengthening, stretching, balancing and coordination activities that train the nerves and muscles to react and communicate to restore normal body movement patterns to increase self care independence. Leslie received (15) minutes of interventions listed below.  x = interventions performed today * = level of progression of activity     Neuromuscular Re-education 1/24/2024    Finger opposition  x 20x   Finger  taps (extension) x 20x   Finger opposition with slides x    Table Top active assist range of motion  - shoulder flexion  - shoulder abduction  x 5x - 15 second holds     Self Care: Fundamental skills required to independently care for oneself. Activities of daily living such as eating, bathing, dressing, toileting, grooming, sleeping, transfers and mobility. Instrumental activities of daily living such as driving, medication management, pet care, home management/cleaning, financial management, using the phone, meal preparation and shopping. Leslie received (0) minutes of interventions listed below.  x = interventions performed * = level of progression of activity     Self Care 1/24/2024                            Home Exercises and Education Provided     Education provided:   - home exercise program education provided  - progress towards goals     Written Home Exercises Provided: Patient instructed to cont prior HEP.  Exercises were reviewed and Leslie was able to demonstrate them prior to the end of the session. Leslie demonstrated good understanding of the home exercise program provided.     See EMR under Patient Instructions for exercises provided prior visit.        Assessment     Patient tolerated exercises well. Needs verbal cues for hand placement.     Leslie is progressing towards her goals and there are no updates to goals at this time. Patient prognosis is Good.     Patient will continue to benefit from skilled outpatient occupational therapy to address the deficits listed in the problem list on initial evaluation provide patient/family education and to maximize patient's level of independence in the home and community environment.     Patient's spiritual, cultural and educational needs considered and patient agreeable to plan of care and goals.    Anticipated barriers to occupational therapy: Cognition, home exercise program compliance    Goals:     Short Term Goals:  6 weeks  Progress    Pain: Patient will  demonstrate improved pain by reports of less than or equal to 7/10 worst pain on the verbal rating scale in order to progress toward maximal functional ability and improve quality of life. PC   Function: Patient will demonstrate improved function as indicated by a functional limitation score of less than or equal to 46 out of 100 on FOTO. PC   Mobility: Patient will improve active range of motion to 50% of stated goals, listed in objective measures above, in order to progress towards independence with functional activities.  PC   Strength: Patient will improve strength to 50% of stated goals, listed in objective measures above, in order to progress towards independence with functional activities.  PC   HEP: Patient will demonstrate independence with home exercise program in order to progress toward functional independence. PC      Long Term Goals:  12 weeks  Progress   Pain: Patient will demonstrate improved pain by reports of less than or equal to 6/10 worst pain on the verbal rating scale in order to progress toward maximal functional ability and improve quality of life.   PC   Function: Patient will demonstrate improved function as indicated by a functional limitation score of less than or equal to 48 out of 100 on FOTO. PC   Mobility: Patient will improve active range of motion to stated goals, listed in objective measures above, in order to return to maximal functional potential and improve quality of life. PC   Strength: Patient will improve strength to stated goals, listed in objective measures above, in order to improve functional independence and quality of life. PC   Patient will return to normal ADL's, IADL's, community involvement, recreational activities, and work-related activities with less than or equal to 5/10 pain and maximal function.  PC      Goals Key:  PC= Progressing/Continue;       PM= Partially Met;       GM= Goal Met,      DC= Discontinue    Plan     Continue Plan of Care (POC) and progress  per patient tolerance.      Ivana Gallo, OT  ,OTD, OTR/L

## 2024-01-24 NOTE — PROGRESS NOTES
[facility]  Speech Therapy Treatment Note- Neurological Rehabilitation  Date: 1/24/2024     Name: Leslie Wood   MRN: [id number]   Therapy Diagnosis:   Encounter Diagnosis   Name Primary?    Cognitive communication disorder Yes   Physician: Marily Millan NP  Physician Orders: Ambulatory Referral to Speech Therapy   Medical Diagnosis: Disorientation [R41.0], Aphasia [R47.01]     Visit #/ Visits Authorized: 2/ 12 (+eval, +5)  Date of Evaluation:  11/8/23  Insurance Authorization Period: 11/15/2023 - 12/31/2023   Plan of Care Expiration Date:    1/31/24  Extended Plan of Care:  -   Progress Note: 12/8/23     Time In:  9:11 AM  Time Out:  10:00 AM  Total Billable Time: 49 mins      Precautions: Standard, Fall, Cognition, and Communication  Subjective:   Patient reports: she is doing okay, she has had increased stress related to her daughter's health and also increased pain with reported impact on her cognition.   She was compliant to home exercise program.     Response to previous treatment: NA    Pain Scale: 9/10 on a Visual Analog Scale currently.  Pain Location: back and leg  Objective:   TIMED  Procedure Min.   Cognitive Therapeutic Interventions, first 15 minutes CPT 59821  15   Cognitive Therapeutic Interventions, each additional 15 minutes CPT 38086  20           Short Term Goals: (6 weeks) Current Progress:   Patient will sustain attention to a simple task (auditory or visual) for 3 minutes with 90% accuracy or no more than 2 redirections.      Goal Met 12/13/2023   Auditory: met x2  Visual: met x7 Auditory Sustained Attention:  Several sustained auditory attention tasks completed in today's session. Patient listened to multi-step commands and followed with 91% accuracy. She required initial verbal cueing for use of strategies including taking requesting repetition as needed. Improved use of strategies independently with overall improved accuracy in today's session.    Visual Sustained  Attention:  Patient completed several visual sustained attention tasks in today's session in which she visually scanned for letters/numbers in a paragraph.   Trial 1: 1:30, 100% accuracy, 1/7 relative scale of cognitive load   Trial 2: 1:45, 100% accuracy, 1/7 relative scale of cognitive load   Trial 3: 1:30, 100% accuracy, 1/7 relative scale of cognitive load    Trial 4: 2:08, -7 (missed entire row of scanning)   Trial 5: 2:10, 100% accuracy, 1/7 relative scale of cognitive load   Trial 6: 1:30, 100% accuracy, 1/7 relative scale of cognitive load     Overall improved use of strategies to facilitate visual scanning pattern as well as good awareness of errors. Overall high accuracy of completion with low cognitive load.       2. Patient will complete selective attention tasks (auditory or visual) with 85% accuracy independently increase selective attention.      Progressing/ Not Met 1/24/2024   Auditory: met x5  Visual: met x1 Auditory Selective Attention:  Patient listened for and followed complex 2 step commands in a dynamic environment with auditory and visual distractions during completion. She followed commands with 92% accuracy and initial verbal cueing for use of strategies. Discussion of how this relates to conversation and asking clarifying questions to facilitate attention and information processing. She rated task as 2/7 on relative scale of cognitive load.    She then listened to a 5 minute video about how basketballs are made. She requested to take notes as needed and paused/rewound video as needed. She answered questions about what she heard with 92% accuracy and rated task as 2/7 on relative scale of cognitive load.     Visual Selective Attention:  Patient participated in visual selective attention tasks in today's session in which she visually scanned for target letters in a jumbled paragraphs. She completed x2. Initially, she completed with 50% accuracy and patient independently using strategy of  using pen as a guide to facilitate visual attention. She then completed again, with cues for visual scanning strategy as well as to state target letter aloud and reduce rate as able to facilitate accuracy. She completed second attempt with 100% accuracy and rated task as 4/7 on relative scale of cognitive load.      3. Patient will use attention shifting strategies to shift attention between two tasks (auditory or visual) with no more than 3 cues or 90% accuracy to improve alternating attention.      Progressing/ Not Met 1/24/2024  Not formally addressed    4. Patient will recall functional information (name, birthday, address, etc) following delays of 2-3 minutes following a prompt questions across 3 therapy sessions with 80% accuracy (spaced retrieval).      Progressing/ Not Met 1/24/2024   Not formally addressed     5. Patient will complete a functional task to improve attention, memory and/or executive functions (I.e. sample bill paying activity, recipe, or multiple choice comprehension questions to 1 paragraphs) with 80% accuracy and natural environment noise distractions (TV news background, music, etc.).      Progressing/ Not Met 1/24/2024    Not formally addressed     6. Patient will be educated regarding cognitive deficits as applicable to the patient's life for increased awareness and will execute returned demonstration of information with 80% accuracy.       Goal Met 1/24/2024  Patient and her granddaughter again educated regarding nature of cognitive deficits including a variety of foundational cognitive elements related to R hemisphere dysfunction following CVA including the cognitive triangle (with emphasis on foundational versus higher level cognitive functions, awareness of deficits, impact of attention, information processing, memory, and executive functioning deficits as each area relates to assessment findings), spoon theory to discuss cognitive versus physical versus emotional fatigue and  management of each to more efficiently use energy daily for more or less cognitively demanding tasks, internal versus external distractions (including emotions, pain, stressors, etc) and management of each, as well as overall impact of these deficits on daily functioning. Patient demonstrated awareness of information provided as demonstrated by asking good questions and expressing understanding. Discussed specifically use of mindfulness and meditation/breathing strategies to improve cognitive functioning related to internal, specifically emotional stressors.       7. Patient will participate in continued cognitive assessment of right hemisphere dysfunction.     Goal Met 2023   See results of RICE below.      Patient Education/Response:   Patient educated regarding the followin. Cognitive triangle as it results to assessment findings/impact on functioning   2. Impact of specific right hemisphere dysfunction on reading, inattention/left neglect, ability to complete daily tasks     Home program established: Patient instructed to continue prior program  Patient verbalized understanding to all above education provided.     See Electronic Medical Record under Patient Instructions for exercises provided throughout therapy.  Assessment:   In today's session, significant time spent discussing cognitive deficits again, specifically related to reduced attention impacted negatively by internal and external distractors such as emotions, pain, fatigue, stressors, etc. She expressed understanding and commented on increased difficulty focusing and therefore finding words, recalling related to stress with her daughter's health. Additionally, visual selective attention tasks completed in today's session with difficulty sustaining attention to locate target letters effectively; however, improvement given use of strategies.     Leslie is progressing well towards her goals. Current goals remain appropriate. Goals to be updated  as necessary.     Patient prognosis is Fair. Patient will continue to benefit from skilled outpatient speech and language therapy to address the deficits listed in the problem list on initial evaluation, provide patient/family education and to maximize patient's level of independence in the home and community environment.     Medical necessity is demonstrated by the following IMPAIRMENTS:  Cognition: Deficits in executive functioning, attention, and memory prevent the pt from relaying medically and safety relevant information in a timely manner in a state of emergency.     Barriers to Therapy: NA  Patient's spiritual, cultural and educational needs considered and patient agreeable to plan of care and goals.  Plan:   Continue Plan of Care with focus on rehabilitation and compensation for attention, memory, and executive functioning deficits impacting safety and efficiency of daily task completion.     Rabia Lane MA, CCC-SLP  Speech Language Pathologist  1/24/2024

## 2024-01-25 ENCOUNTER — TELEPHONE (OUTPATIENT)
Dept: ORTHOPEDICS | Facility: CLINIC | Age: 84
End: 2024-01-25

## 2024-01-25 ENCOUNTER — OFFICE VISIT (OUTPATIENT)
Dept: ORTHOPEDICS | Facility: CLINIC | Age: 84
End: 2024-01-25
Payer: MEDICARE

## 2024-01-25 ENCOUNTER — HOSPITAL ENCOUNTER (OUTPATIENT)
Dept: RADIOLOGY | Facility: HOSPITAL | Age: 84
Discharge: HOME | End: 2024-01-25
Attending: ORTHOPAEDIC SURGERY
Payer: MEDICARE

## 2024-01-25 VITALS — BODY MASS INDEX: 25.49 KG/M2 | WEIGHT: 135 LBS | HEIGHT: 61 IN

## 2024-01-25 DIAGNOSIS — M17.11 ARTHRITIS OF KNEE, RIGHT: Primary | ICD-10-CM

## 2024-01-25 DIAGNOSIS — M25.562 PAIN IN BOTH KNEES, UNSPECIFIED CHRONICITY: ICD-10-CM

## 2024-01-25 DIAGNOSIS — M25.561 PAIN IN BOTH KNEES, UNSPECIFIED CHRONICITY: ICD-10-CM

## 2024-01-25 DIAGNOSIS — M23.51 RECURRENT RIGHT KNEE INSTABILITY: ICD-10-CM

## 2024-01-25 DIAGNOSIS — Z98.1 HISTORY OF LUMBAR FUSION: ICD-10-CM

## 2024-01-25 PROCEDURE — 20610 DRAIN/INJ JOINT/BURSA W/O US: CPT | Mod: PBBFAC,RT | Performed by: ORTHOPAEDIC SURGERY

## 2024-01-25 PROCEDURE — 99999 PR PBB SHADOW E&M-EST. PATIENT-LVL III: CPT | Mod: PBBFAC,,, | Performed by: ORTHOPAEDIC SURGERY

## 2024-01-25 PROCEDURE — 73564 X-RAY EXAM KNEE 4 OR MORE: CPT | Mod: TC,50

## 2024-01-25 PROCEDURE — 99999PBSHW PR PBB SHADOW TECHNICAL ONLY FILED TO HB: Mod: JZ,PBBFAC,,

## 2024-01-25 PROCEDURE — 73564 X-RAY EXAM KNEE 4 OR MORE: CPT | Mod: 26,50,, | Performed by: RADIOLOGY

## 2024-01-25 PROCEDURE — 99213 OFFICE O/P EST LOW 20 MIN: CPT | Mod: PBBFAC | Performed by: ORTHOPAEDIC SURGERY

## 2024-01-25 PROCEDURE — 99214 OFFICE O/P EST MOD 30 MIN: CPT | Mod: 25,S$PBB,, | Performed by: ORTHOPAEDIC SURGERY

## 2024-01-25 RX ADMIN — TRIAMCINOLONE ACETONIDE EXTENDED-RELEASE INJECTABLE SUSPENSION 32 MG: KIT INTRA-ARTICULAR at 08:01

## 2024-01-25 NOTE — PROCEDURES
Large Joint Aspiration/Injection: R knee    Date/Time: 1/25/2024 8:40 AM    Performed by: Luis Ibarra MD  Authorized by: Luis Ibarra MD    Consent Done?:  Yes (Verbal)  Indications:  Arthritis and pain  Site marked: the procedure site was marked    Timeout: prior to procedure the correct patient, procedure, and site was verified    Prep: patient was prepped and draped in usual sterile fashion    Local anesthesia used?: No    Local anesthetic:  Topical anesthetic    Details:  Needle Size:  22 G  Ultrasonic Guidance for needle placement?: No    Approach:  Anterolateral  Location:  Knee  Site:  R knee  Medications:  32 mg triamcinolone acetonide 32 mg  Patient tolerance:  Patient tolerated the procedure well with no immediate complications     Right Bryan

## 2024-01-25 NOTE — PATIENT INSTRUCTIONS
Of the right knee with slow acting steroid/Zilretta performed 1/25/24   Ice the needed next few days if it bothers you   Continue with the Rollator   You can still take Tylenol as needed  I will see you in 3 months

## 2024-01-25 NOTE — PROGRESS NOTES
Subjective:     Patient ID: Leslie Wood is a 83 y.o. female.    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  She had a CT scan which I reviewed of her head that showed that she had a stroke and we do not think that she is a candidate for total knee replacement due to medical conditions.  I did tell him anesthesia might bring her level of function to 1 level lower than when she was preop and we should avoid it at this time.  She needs to recuperate completely from her stroke.  Her neurologist at told what ever she gets after 1 year in of therapy is there is what she is going to get.  She is going to physical therapy at the [facility] and they want something about her knee if they can strengthen that.  She does have a brace for her back that she wears but not for her knee.  She uses a walker to get around.  She is here with her granddaughter and we discussed natural products.  She knows she cannot take NSAIDs because she is on Plavix and will injure her stomach and history of GERD the.  She needs to take natural with stuff.  In the future I did tell her steroid might be the only option for her in may end up being prednisone pills when time comes in the future   She denies loss of bowel bladder control she is alert today and oriented and answering questions appropriately  Granddaughter is here with her helping.      05/19/2023   Severe right knee pain   Scratched by a dog on the left calf that is painful   Patient stated that the right knee Depo-Medrol injection given 03/24/2023 wore out 2 weeks ago.  She is willing to proceed with viscosupplementation since we are avoiding having surgery at all cost.  She stated the dog scratched her and ran into her and caused some bruising and worried about an area that seems  to be hard and inflamed.  She is using her Rollator to get around.  She is not a surgical candidate due to medical issues.  She can not take NSAIDs she is on Plavix and history of GERD   She is here with granddaughter very pleasant alert oriented telling jokes.    07/06/2023   Severe right knee arthritis  Cellulitis by a dog scratch which we treated last time with antibiotics and that is completely healed  The severe right knee arthritis we can do much said trying different injections and she is going to physical therapy who they recommended maybe bracing.  I did tell her skin is too thin and the brace might rub and create an issue.  She is using a walker to walk around she does see pain management including doctors [facility] and  [facility]  Complaining of severe pain to the back which I told her I can not help her with in her pain is 8/10.  The knee nenita and subluxes and she has pain throughout most pronounced right now on the anterolateral aspect where she points at.  I reviewed her medications and her medical conditions with her and her granddaughter  No fever no chills no shortness of breath no difficulty with chewing swallowing loss of bowel bladder control or blurry vision double vision loss sense smell or taste      10/09/2023   Right knee severe arthritis  The cellulitis from a dog scratch all resolved   Seen recently by Cardiology Dr. Martins and she was told if she wants to have surgery she can.  She is thinking about not be able to tolerate the pain she is in right now.  There is injections they all lot last maybe 2 weeks.  The last Kenalog injection barely lasted 2 weeks.  She is looking that maybe she can have her surgery we discussed it today with granddaughter and the patient and showed her on the model and showed her x-rays of total knees.  I did tell her it takes roughly 3-6 months for complete healing to occur.  We have tried almost everything except Zilretta.  We will see if that  helps long enough so we can avoid surgery.  Patient wants to have her surgery done but would like to still try 1 more thing prior to undergoing surgery.      No fever no chills no shortness of breath.  She uses a Rollator to walk around      10/23/2023   Right knee severe arthritis  The cellulitis resolved from dog scratch  Today she is wearing a brace and she states that could slide down and she has to tighten it up a little bit.  I did tell her the wrap around brace is much easier to put on than the pull up braces.  She is using the walker to get around.  The neurologist saw her in wants a to undergo OT and PT as well as speech therapy she is about to start that   The injection given last time did not seem to last too long despite the fact she states her pain is 0/10   We will give Zilretta today since she change her mind about having surgery/right TKA.  I did tell her some of her pains are coming from her back she does have lumbar fusion and nerve stimulator in the lumbar spine   She walks with a Rollator  She is here with her daughter and granddaughter    01/25/2024   Right knee severe arthritis  The cellulitis from the dog scratched has resolved   She stated Zilretta seems to work really well for her.  She finally got better until a week ago.  Now her pain is 8/10.  She very rarely takes any Tylenol.  She has using a Rollator to get around.  She is going to physical therapy and dry needling seems to help  No fever no chills no shortness of breath  She is here with family  Past Medical History:   Diagnosis Date    Arthritis     Cataract     GERD (gastroesophageal reflux disease)     Heart attack 05/18/2022    Heart attack 05/23/2022    Hyperlipidemia     Hypertension     Stroke      Past Surgical History:   Procedure Laterality Date    ADENOIDECTOMY      ADRENAL GLAND SURGERY      APPENDECTOMY      BACK SURGERY      fusion l 4-5 s 1,2,3  fusion l 2-3    CORONARY ANGIOGRAPHY N/A 05/20/2022    Procedure:  ANGIOGRAM, CORONARY ARTERY;  Surgeon: Domingo Martins MD;  Location: [facility] CATH LAB;  Service: Cardiology;  Laterality: N/A;    CORONARY ANGIOGRAPHY N/A 05/24/2022    Procedure: ANGIOGRAM, CORONARY ARTERY;  Surgeon: Domingo Martins MD;  Location: [facility] CATH LAB;  Service: Cardiology;  Laterality: N/A;    EYE SURGERY      HEMORRHOID SURGERY      HERNIA REPAIR      HYSTERECTOMY      partial    indirect lumbar decompression      percutaneous placement of interspinous extension blocker    LEFT HEART CATHETERIZATION Left 05/20/2022    Procedure: CATHETERIZATION, HEART, LEFT;  Surgeon: Domingo Martins MD;  Location: [facility] CATH LAB;  Service: Cardiology;  Laterality: Left;    TONSILLECTOMY       Family History   Problem Relation Age of Onset    Heart disease Mother     Hypertension Mother     Diabetes Mother     Hypertension Father     Kidney disease Father     Breast cancer Sister      Social History     Socioeconomic History    Marital status:    Tobacco Use    Smoking status: Never    Smokeless tobacco: Never   Substance and Sexual Activity    Alcohol use: No    Drug use: No    Sexual activity: Not Currently     Medication List with Changes/Refills   Current Medications    ASPIRIN (ECOTRIN) 81 MG EC TABLET    Take 1 tablet (81 mg total) by mouth once daily.    ATORVASTATIN (LIPITOR) 40 MG TABLET    Take 1 tablet (40 mg total) by mouth once daily.    DULOXETINE (CYMBALTA) 30 MG CAPSULE    Take 30 mg by mouth once daily.    FUROSEMIDE (LASIX) 20 MG TABLET    TAKE 1 TABLET BY MOUTH ONCE A DAY    LOSARTAN (COZAAR) 50 MG TABLET    Take 1 tablet (50 mg total) by mouth 2 (two) times a day.    METOPROLOL SUCCINATE (TOPROL-XL) 25 MG 24 HR TABLET    Take 1 tablet (25 mg total) by mouth once daily.    MUPIROCIN (BACTROBAN) 2 % OINTMENT    Apply topically 2 (two) times daily.    NUCYNTA 50 MG TAB    Take 1 tablet (50 mg total) by mouth every 8 (eight) hours as needed (severe pain). RESTART WHEN OK PER PAIN MANAGEMENT  PROVIDER    TIZANIDINE (ZANAFLEX) 4 MG TABLET         Review of patient's allergies indicates:   Allergen Reactions    Amitriptyline     Hydrochlorothiazide      Causes muscle cramping    Lisinopril      hyperkalemia    Oxycodone     Percocet [oxycodone-acetaminophen] Other (See Comments)     Seizures    Belbuca [buprenorphine hcl] Nausea And Vomiting and Other (See Comments)     Black out     Codeine Nausea Only and Rash    Prazosin Other (See Comments)     dizziness     Review of Systems   Constitutional: Negative for decreased appetite.   HENT:  Negative for tinnitus.    Eyes:  Negative for double vision.   Cardiovascular:  Negative for chest pain.   Respiratory:  Negative for wheezing.    Hematologic/Lymphatic: Negative for bleeding problem.   Skin:  Negative for dry skin.   Musculoskeletal:  Positive for arthritis, back pain, joint pain and muscle weakness. Negative for gout, neck pain and stiffness.   Gastrointestinal:  Negative for abdominal pain.   Genitourinary:  Negative for bladder incontinence.   Neurological:  Negative for numbness, paresthesias and sensory change.   Psychiatric/Behavioral:  Negative for altered mental status.        Objective:   Body mass index is 25.51 kg/m².  There were no vitals filed for this visit.         General    Constitutional: She is oriented to person, place, and time. She appears well-developed.   HENT:   Head: Atraumatic.   Eyes: EOM are normal.   Pulmonary/Chest: Effort normal.   Neurological: She is alert and oriented to person, place, and time.   Psychiatric: Judgment normal.           Ambulating well with a walker very steady  In the sitting position her pelvis is level  Bilateral hips passive motion without pain in the groin.    Hip flexors and abductors and adductors are slightly weak at 5-/5   Right knee with mild to moderate swelling.  There is no warmness to touch.  There is crepitus with motion.  She has 5-120 degrees and range of motion.  No defect in the  patella or quadriceps tendon.  There is weakness around 4/5 in the quads and hamstrings.  Pain is throughout the knee joint .  Slightly loose lateral collateral to valgus and varus stressing with subluxation and clicking felt today  Left knee mild degeneration and mild tenderness.  There is no swelling.  Good motion 0-125.  No defect in the patella or quadriceps tendon   Left calf with scratches on the medial aspect proximal 3rd.  Resolved .  There is erythema and induration around 2 by 3 cm where the scratches are.  There is ecchymosis around the area also resolved.  Calves are soft bilaterally and nontender  There is multiple varicosities in the lower extremity  Skin over the knees within normal limits no obvious lesions    Relevant imaging results reviewed and interpreted by me, discussed with the patient and / or family today   X-ray 01/25/2024 right knee with complete loss of lateral joint space with severe valgus deformity marginal osteophytes sclerosis of the knee.  The left knee joint space is maintained mild degenerative changes  X-ray 11/10/2022 of the hips within normal limits no evidence of arthritis  X-ray of the knees I 11/10/2022 on the right side showing there is a suprapatellar hematoma there is marginal osteophytic changes there is medial subluxation of the femur on the tibia consistent with severe arthritic change.  Assessment:     Encounter Diagnoses   Name Primary?    Arthritis of knee, right Yes    Recurrent right knee instability     History of lumbar fusion         Plan:   Arthritis of knee, right  -     Large Joint Aspiration/Injection: R knee    Recurrent right knee instability    History of lumbar fusion         Patient Instructions   Of the right knee with slow acting steroid/Zilretta performed 1/25/24   Ice the needed next few days if it bothers you   Continue with the Rollator   You can still take Tylenol as needed  I will see you in 3 months        Disclaimer: This note was prepared  using a voice recognition system and is likely to have sound alike errors within the text.

## 2024-01-31 ENCOUNTER — EXTERNAL CHRONIC CARE MANAGEMENT (OUTPATIENT)
Dept: PRIMARY CARE CLINIC | Facility: CLINIC | Age: 84
End: 2024-01-31
Payer: MEDICARE

## 2024-01-31 PROCEDURE — 99439 CHRNC CARE MGMT STAF EA ADDL: CPT | Mod: S$PBB,,, | Performed by: FAMILY MEDICINE

## 2024-01-31 PROCEDURE — 99490 CHRNC CARE MGMT STAFF 1ST 20: CPT | Mod: PBBFAC | Performed by: FAMILY MEDICINE

## 2024-01-31 PROCEDURE — 99490 CHRNC CARE MGMT STAFF 1ST 20: CPT | Mod: S$PBB,,, | Performed by: FAMILY MEDICINE

## 2024-01-31 PROCEDURE — 99439 CHRNC CARE MGMT STAF EA ADDL: CPT | Mod: PBBFAC | Performed by: FAMILY MEDICINE

## 2024-02-15 ENCOUNTER — OFFICE VISIT (OUTPATIENT)
Dept: CARDIOLOGY | Facility: CLINIC | Age: 84
End: 2024-02-15
Payer: MEDICARE

## 2024-02-15 ENCOUNTER — HOSPITAL ENCOUNTER (OUTPATIENT)
Dept: CARDIOLOGY | Facility: HOSPITAL | Age: 84
Discharge: HOME | End: 2024-02-15
Attending: INTERNAL MEDICINE
Payer: MEDICARE

## 2024-02-15 VITALS
SYSTOLIC BLOOD PRESSURE: 120 MMHG | WEIGHT: 136.69 LBS | HEIGHT: 61 IN | HEART RATE: 53 BPM | OXYGEN SATURATION: 100 % | BODY MASS INDEX: 25.81 KG/M2 | DIASTOLIC BLOOD PRESSURE: 72 MMHG

## 2024-02-15 DIAGNOSIS — I42.8 NONISCHEMIC CARDIOMYOPATHY: ICD-10-CM

## 2024-02-15 DIAGNOSIS — E78.5 HYPERLIPIDEMIA ASSOCIATED WITH TYPE 2 DIABETES MELLITUS: ICD-10-CM

## 2024-02-15 DIAGNOSIS — R07.89 OTHER CHEST PAIN: Primary | ICD-10-CM

## 2024-02-15 DIAGNOSIS — I70.0 ATHEROSCLEROSIS OF AORTA: ICD-10-CM

## 2024-02-15 DIAGNOSIS — Z76.89 ENCOUNTER TO ESTABLISH CARE: Primary | ICD-10-CM

## 2024-02-15 DIAGNOSIS — E11.69 HYPERLIPIDEMIA ASSOCIATED WITH TYPE 2 DIABETES MELLITUS: ICD-10-CM

## 2024-02-15 DIAGNOSIS — Z76.89 ENCOUNTER TO ESTABLISH CARE: ICD-10-CM

## 2024-02-15 PROCEDURE — 99214 OFFICE O/P EST MOD 30 MIN: CPT | Mod: S$PBB,,, | Performed by: INTERNAL MEDICINE

## 2024-02-15 PROCEDURE — 93010 ELECTROCARDIOGRAM REPORT: CPT | Mod: ,,, | Performed by: INTERNAL MEDICINE

## 2024-02-15 PROCEDURE — 99999 PR PBB SHADOW E&M-EST. PATIENT-LVL IV: CPT | Mod: PBBFAC,,, | Performed by: INTERNAL MEDICINE

## 2024-02-15 PROCEDURE — 99214 OFFICE O/P EST MOD 30 MIN: CPT | Mod: PBBFAC,25 | Performed by: INTERNAL MEDICINE

## 2024-02-15 PROCEDURE — 93005 ELECTROCARDIOGRAM TRACING: CPT

## 2024-02-15 RX ORDER — ATORVASTATIN CALCIUM 10 MG/1
10 TABLET, FILM COATED ORAL NIGHTLY
Qty: 90 TABLET | Refills: 3 | Status: SHIPPED | OUTPATIENT
Start: 2024-02-15 | End: 2025-02-14

## 2024-02-15 NOTE — PROGRESS NOTES
Subjective:   Patient ID:  Leslie Wood is a 83 y.o. female who presents for evaluation of No chief complaint on file.  2.15.2024  Comes in for six-month follow-up.    She states that 5 days ago she had some midepigastric chest pain.  However EKG looks normal today.    She recently lost her daughter.    She is accompanied by her granddaughter today.    Was 1 episode however none today.    8.30.2023  Comes in for follow-up.    Two weeks cardiac monitor non relevant. no more episodes of TIA  Denies any chest pain, palpitations syncope presyncope   She had her colonoscopy done.    No lower extremity swelling.    Walks with a walker.  Doing occupational therapy.    History of chronic knee pain.    05/04/2023.    Comes in for evaluation for loop recorder.    She had seen neurology.    She had the 14 days monitor did not show any arrhythmias.    She does not have any palpitations.    She is wobbly on her walking.    She is bruising very easily with aspirin.    Her CT scan did not show multiple areas of ischemia to suspect embolic stroke.    She also has history with reveal stricture and 80 is not possible for her.    She states that when she wore the monitor she was and 3 where in the whole time and she is not sure about the quality of the monitor and she also states that she missed the new monitor that was sent to her house.      1.31.2023  She presented last month to the hospital with syncope.  Her beta-blocker dose was decreased.    And was switched from carvedilol to Toprol 25 mg daily.    In the last 2 days she has been out of her metoprolol.    She states that she is awaiting an appointment in March for evaluation for knee replacement.    She denies any more chest pain.    Her repeat echocardiogram in the hospital showed recovery of her LV EF from 30-40% to 60%.    She denies any dyspnea, chest pain, palpitations, syncope presyncope.        7.27.2022  Comes in for a follow up after recent visit to the ED  She  states after she took the first dose of recently prescribed BELBUCA for back pain, she felt immediately dyspnea, resolved in the ED  She had no changes on ekg and two negative troponins with an elevated BNP of 200   She has been off the lasix, baseline BNP is normal   She does reports FUENTES NYHA 1-2 lately, but no orthopnea and no leg swelling     6.6.2022  Comes in for follow-up.  She was admitted twice to the hospital last month with chest pain.  Had 2 heart catheterization.  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  Her EF is low.  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  She denies any more chest pain.  Her memory loss has been worse as per prior visit with her daughter in the hospital.  No bleeding  Also seen Dr. Carl for 2nd opinion    Interval hx: 2/11/2021   Complains of occipital headache  No chest pain, no dyspnea  States she is doing rehab and sometimes her BP is 200   Not taking lasix. On imdur  Esr came back normal after last visit       Initial HPI: 10/1/2020 Patient 80-year-old, prediabetic, with history of hypertension, who recently had a stroke about 3 months ago and was hospitalized at [facility].  She does not recall what kind of cardiac workup she underwent while at that hospital.  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  Her left-sided weakness has improved since the stroke.  She is undergoing rehab.  And should undergo speech therapy soon as she states.  She reports compliance with her blood pressure medications, however she reports feeling dizzy when she stands up, she does not get dizzy or any other circumstances.  She states that she is not drinking enough water.  Also she complains of bilateral lower extremity mild swelling that comes on and off when she takes or not take her amlodipine.  She denies any chest pain, dyspnea, orthopnea, PND, palpitations, syncope, presyncope,  bleeding.                Past Medical History:   Diagnosis Date    Arthritis     Cataract     GERD (gastroesophageal reflux disease)     Heart attack 05/18/2022    Heart attack 05/23/2022    Hyperlipidemia     Hypertension     Stroke        Past Surgical History:   Procedure Laterality Date    ADENOIDECTOMY      ADRENAL GLAND SURGERY      APPENDECTOMY      BACK SURGERY      fusion l 4-5 s 1,2,3  fusion l 2-3    CORONARY ANGIOGRAPHY N/A 05/20/2022    Procedure: ANGIOGRAM, CORONARY ARTERY;  Surgeon: Domingo Martins MD;  Location: [facility] CATH LAB;  Service: Cardiology;  Laterality: N/A;    CORONARY ANGIOGRAPHY N/A 05/24/2022    Procedure: ANGIOGRAM, CORONARY ARTERY;  Surgeon: Domingo Martins MD;  Location: [facility] CATH LAB;  Service: Cardiology;  Laterality: N/A;    EYE SURGERY      HEMORRHOID SURGERY      HERNIA REPAIR      HYSTERECTOMY      partial    indirect lumbar decompression      percutaneous placement of interspinous extension blocker    LEFT HEART CATHETERIZATION Left 05/20/2022    Procedure: CATHETERIZATION, HEART, LEFT;  Surgeon: Domingo Martins MD;  Location: [facility] CATH LAB;  Service: Cardiology;  Laterality: Left;    TONSILLECTOMY         Social History     Tobacco Use    Smoking status: Never    Smokeless tobacco: Never   Substance Use Topics    Alcohol use: No    Drug use: No       Family History   Problem Relation Age of Onset    Heart disease Mother     Hypertension Mother     Diabetes Mother     Hypertension Father     Kidney disease Father     Breast cancer Sister        Review of Systems   Constitutional: Negative for fever and malaise/fatigue.   HENT: Negative for sore throat.    Eyes: Negative for blurred vision.   Cardiovascular: Negative for chest pain, claudication, cyanosis, dyspnea on exertion, irregular heartbeat, leg swelling, near-syncope, orthopnea, palpitations, paroxysmal nocturnal dyspnea and syncope.         Current Outpatient Medications on File Prior to Visit   Medication Sig     aspirin (ECOTRIN) 81 MG EC tablet Take 1 tablet (81 mg total) by mouth once daily.    DULoxetine (CYMBALTA) 30 MG capsule Take 30 mg by mouth once daily.    furosemide (LASIX) 20 MG tablet TAKE 1 TABLET BY MOUTH ONCE A DAY    losartan (COZAAR) 50 MG tablet Take 1 tablet (50 mg total) by mouth 2 (two) times a day.    metoprolol succinate (TOPROL-XL) 25 MG 24 hr tablet Take 1 tablet (25 mg total) by mouth once daily.    mupirocin (BACTROBAN) 2 % ointment Apply topically 2 (two) times daily.    NUCYNTA 50 mg Tab Take 1 tablet (50 mg total) by mouth every 8 (eight) hours as needed (severe pain). RESTART WHEN OK PER PAIN MANAGEMENT PROVIDER    tiZANidine (ZANAFLEX) 4 MG tablet     [DISCONTINUED] atorvastatin (LIPITOR) 40 MG tablet Take 1 tablet (40 mg total) by mouth once daily.    [DISCONTINUED] carvediloL (COREG) 6.25 MG tablet Take 1 tablet (6.25 mg total) by mouth 2 (two) times daily. TAKE (1) TABLET BY MOUTH 2 TIMES A DAY WITH MEALS    [DISCONTINUED] cloNIDine (CATAPRES) 0.1 MG tablet Take 1 tablet (0.1 mg total) by mouth 2 (two) times daily. To take an extra dose if BP >160/90    [DISCONTINUED] diclofenac sodium (VOLTAREN) 1 % Gel Apply 2 g topically 3 (three) times daily.    [DISCONTINUED] isosorbide mononitrate (IMDUR) 30 MG 24 hr tablet TAKE 1 TABLET BY MOUTH TWICE A DAY    [DISCONTINUED] metoprolol tartrate (LOPRESSOR) 25 MG tablet Take 1 tablet (25 mg total) by mouth 3 (three) times daily.    [DISCONTINUED] nitroGLYCERIN (NITROSTAT) 0.4 MG SL tablet Place 1 tablet (0.4 mg total) under the tongue every 5 (five) minutes as needed for Chest pain.    [DISCONTINUED] valsartan (DIOVAN) 160 MG tablet TAKE 1 TABLET BY MOUTH TWICE A DAY     No current facility-administered medications on file prior to visit.       Objective:   Objective:  Wt Readings from Last 3 Encounters:   02/15/24 62 kg (136 lb 11 oz)   01/25/24 61.2 kg (135 lb)   01/10/24 61.2 kg (135 lb)     Temp Readings from Last 3 Encounters:   01/10/24 97.6  °F (36.4 °C) (Tympanic)   11/02/23 96.8 °F (36 °C) (Tympanic)   09/04/23 97.9 °F (36.6 °C) (Oral)     BP Readings from Last 3 Encounters:   02/15/24 120/72   01/10/24 (!) 158/73   11/02/23 120/68     Pulse Readings from Last 3 Encounters:   02/15/24 (!) 53   01/10/24 64   11/02/23 60       Physical Exam  Vitals reviewed.   Constitutional:       Appearance: She is well-developed.   HENT:      Head: Normocephalic and atraumatic.   Eyes:      General: No scleral icterus.     Conjunctiva/sclera: Conjunctivae normal.   Cardiovascular:      Rate and Rhythm: Normal rate and regular rhythm.      Pulses: Intact distal pulses.      Heart sounds: Normal heart sounds. No murmur heard.           Lab Results   Component Value Date    CHOL 139 11/08/2023    CHOL 96 (L) 02/01/2023    CHOL 155 05/20/2022     Lab Results   Component Value Date    HDL 45 11/08/2023    HDL 47 02/01/2023    HDL 45 05/20/2022     Lab Results   Component Value Date    LDLCALC 64.8 11/08/2023    LDLCALC 38.8 (L) 02/01/2023    LDLCALC 98.0 05/20/2022     Lab Results   Component Value Date    TRIG 146 11/08/2023    TRIG 51 02/01/2023    TRIG 60 05/20/2022     Lab Results   Component Value Date    CHOLHDL 32.4 11/08/2023    CHOLHDL 49.0 02/01/2023    CHOLHDL 29.0 05/20/2022       Chemistry        Component Value Date/Time     11/08/2023 1016    K 4.5 11/08/2023 1016     11/08/2023 1016    CO2 26 11/08/2023 1016    BUN 34 (H) 11/08/2023 1016    CREATININE 1.1 11/08/2023 1016    GLU 93 11/08/2023 1016        Component Value Date/Time    CALCIUM 9.8 11/08/2023 1016    ALKPHOS 84 11/08/2023 1016    AST 29 11/08/2023 1016    ALT 39 11/08/2023 1016    BILITOT 0.6 11/08/2023 1016    ESTGFRAFRICA >60 07/23/2022 2111    EGFRNONAA >60 07/23/2022 2111          Lab Results   Component Value Date    TSH 1.388 11/08/2023     Lab Results   Component Value Date    INR 1.0 02/02/2023    INR 1.1 05/24/2022    INR 1.0 05/24/2022     Lab Results   Component Value  Date    WBC 9.97 11/08/2023    HGB 15.0 11/08/2023    HCT 47.9 11/08/2023    MCV 93 11/08/2023     11/08/2023     BNP  @LABRCNTIP(BNP,BNPTRIAGEBLO)@  CrCl cannot be calculated (Patient's most recent lab result is older than the maximum 7 days allowed.).     Imaging:  ======  Results for orders placed during the hospital encounter of 03/02/20   Echo Color Flow Doppler? Yes    Narrative · Mild concentric left ventricular hypertrophy.  · Normal left ventricular systolic function. The estimated ejection   fraction is 60%.  · Normal LV diastolic function.  · Normal right ventricular systolic function.  · Mild aortic regurgitation.  · Normal central venous pressure (3 mmHg).  · Trivial pericardial effusion.        No results found for this or any previous visit.  No results found for this or any previous visit.  Results for orders placed during the hospital encounter of 05/11/17   X-Ray Chest PA And Lateral    Narrative XR CHEST PA AND LATERAL, 05/11/17 11:38:46    Clinical indication: Chest pain.    Findings:  Comparison with 05/10/2017.    Epidural leads within the dorsal thoracic spine.  Lower thoracic wedge compression fracture status post kyphoplasty.  Left upper quadrant surgical clips.    Heart size is normal.  Prominent left pericardial fat pad.    Aortic arch calcification.    Lung fields remain clear.    Impression      Stable chest x-ray.  No acute findings.      Electronically signed by: EMERY SAMAYOA MD  Date:     05/11/17  Time:    12:07      No results found for this or any previous visit.  No procedure found.    Diagnostic Results:  ECG: Reviewed  Marked sinus bradycardia with sinus arrhythmia   Abnormal ECG   When compared with ECG of 28-OCT-2020 16:09,   Criteria for Septal infarct are no longer Present   T wave inversion no longer evident in Anterior leads   Confirmed by MD RODERICK, BARB ([id number]) on 11/12/2020 9:25:42 PM       Results for orders placed during the hospital encounter of  05/24/22    Cardiac catheterization    Conclusion  · The estimated blood loss was none.  · There is severe mid LAD intramyocardial bridging improved with betablockers intra op  · There was no evidence of an acute atherothrombotic lesion    The procedure log was documented by Documenter: Te Waller and verified by Domingo Martnis MD.    Date: 5/24/2022  Time: 9:49 AM    The ASCVD Risk score (Karissa DK, et al., 2019) failed to calculate for the following reasons:    The 2019 ASCVD risk score is only valid for ages 40 to 79    Assessment and Plan:   Other chest pain  -     Troponin I; Future; Expected date: 02/15/2024    Nonischemic cardiomyopathy    Atherosclerosis of aorta    Hyperlipidemia associated with type 2 diabetes mellitus  -     atorvastatin (LIPITOR) 10 MG tablet; Take 1 tablet (10 mg total) by mouth every evening.  Dispense: 90 tablet; Refill: 3        Reviewed all tests and above medical conditions with patient in detail and formulated treatment plan.  Risk factor modification discussed.   Cardiac low salt diet discussed.  Maintaining healthy weight and weight loss goals were discussed in clinic.  Continue with Toprol.  Nonobstructive catheterization in 2022.  Done twice.    EKG normal today.  We will get a troponin.    Recovered nonischemic cardiomyopathy likely secondary to stress    Follow up in 6 months

## 2024-02-16 LAB
OHS QRS DURATION: 64 MS
OHS QTC CALCULATION: 394 MS

## 2024-02-19 ENCOUNTER — CLINICAL SUPPORT (OUTPATIENT)
Dept: REHABILITATION | Facility: HOSPITAL | Age: 84
End: 2024-02-19
Payer: MEDICARE

## 2024-02-19 DIAGNOSIS — Z74.09 DECREASED STRENGTH, ENDURANCE, AND MOBILITY: ICD-10-CM

## 2024-02-19 DIAGNOSIS — R26.9 GAIT ABNORMALITY: ICD-10-CM

## 2024-02-19 DIAGNOSIS — R68.89 DECREASED STRENGTH, ENDURANCE, AND MOBILITY: ICD-10-CM

## 2024-02-19 DIAGNOSIS — M46.1 SACROILIITIS, NOT ELSEWHERE CLASSIFIED: Primary | ICD-10-CM

## 2024-02-19 DIAGNOSIS — R53.1 DECREASED STRENGTH, ENDURANCE, AND MOBILITY: ICD-10-CM

## 2024-02-19 PROCEDURE — 97530 THERAPEUTIC ACTIVITIES: CPT

## 2024-02-19 PROCEDURE — 97140 MANUAL THERAPY 1/> REGIONS: CPT

## 2024-02-19 PROCEDURE — 97110 THERAPEUTIC EXERCISES: CPT

## 2024-02-19 NOTE — PROGRESS NOTES
[facility]   Physical Therapy Treatment Note        Name: Leslie CASTELLON [facility] Number: [id number]    Therapy Diagnosis:   Encounter Diagnoses   Name Primary?    Sacroiliitis, not elsewhere classified Yes    Decreased strength, endurance, and mobility     Gait abnormality        Physician: Marily Millan NP    Visit Date: 2/19/2024    Physician Orders: PT Eval and Treat   Medical Diagnosis from Referral:   Late effect of cerebrovascular accident (CVA)   Hemiplegia and hemiparesis following cerebral infarction affecting left non-dominant side   Gait difficulty   Evaluation Date: 11/16/2023  Authorization Period Expiration: 10/16/2024  Plan of Care Expiration: 4/19/2024  Progress Note Due: 3/19/2024  Visit # / Visits authorized: 12/12 (+3)   FOTO: 1/3      Precautions: Standard and Fall (1/8/24 - patient reports history of 3 back fusion surgeries)  PTA Visit #: 0/5     Time In: 10:00 am  Time Out: 11:00 am  Total Billable Time: 60 minutes   (Billing reflects 1 on 1 treatment time spent with patient)    Patient reports: that she experiences pain at the lower back today.  She reports that pain is worse at the left lumbosacral.  The patient's granddaughter adjoined the patient and reported the her mother (the patient's daughter) recently passed away.  She does report that it was rather sudden and reports that she has been unable to get the patient to exercise regularly.    He/She was partially compliant with home exercise program.  Response to previous treatment: some pain relief  Functional change: Some increased speed with walking    Pain: 7/10     Location: lumbar paraspinal region    Objective      Objective Measures updated at progress report or POC update only unless otherwise noted.     Lumbar Spine ROM: %, BB 75%, R %, L SB 75% left sided pain, R Rot 50%, L Rot 66% pain left lumbar region    Palpation: Trigger points on palpation of bilateral lumbosacral  "paraspinal/multifidus    Treatment     Leslie received the treatments listed below:     THERAPEUTIC EXERCISES to develop strength, endurance, ROM, flexibility, posture, and core stabilization for (30) minutes including:    Intervention Performed Today    Nustep with moist heat pack at low back x  10 minutes, L3   Lower trunk rotations x 10 second holds x 10   Pelvic tilts x Anterior/posterior with cueing- 10 second holds x 2 min   Single knee to chest  10 second holds x 10   Sitting flexion with swiss ball  10x   Isometric adduction with ball   3  min   Abdominal brace x With bilateral knee to chest and slowly lowering - 2x10   Supine x Active straight leg raise - 2x10       Leslie participated in neuromuscular re-education activities to improve: Balance, Coordination, Proprioception, and Posture for 15 minutes. The following activities were included:      Intervention 12/12/2023  Parameters   Sit to stand x 10x/ 2 sets hands together   Prone hip extension x 10x with cues   Sidelying hip series x 2x10 abduction  10x/ 2 sets clamshell   Bridging x 2x10                       Plan for Next Visit: Progress as indicated        MANUAL THERAPY TECHNIQUES were applied for (15) minutes, including:    Manual Intervention Performed Today    Soft Tissue Mobilization x  STM bilateral multifidus and gluteal/ piriformis,   Joint Mobilizations x PA glides lumbosacral   Mobilization with movement     Muscle energy techniques   "Shotgun" for + SI provocation   Functional Dry Needling  x FDN performed to the left lumbosacral paraspinal/multifidus     Plan for Next Visit:         Patient Education and Home Exercises       Home Exercises Provided and Patient Education Provided     Education provided: (5) minutes  Moist heat pack to low back prior to exercises at home.    Written Home Exercises Provided: yes.  Exercises were reviewed and Leslie was able to demonstrate them prior to the end of the session.  Leslie demonstrated fair  understanding " of the education provided. See EMR under Patient Instructions for exercises provided during therapy sessions.    Assessment     Patient returns after bereaving the loss of her daughter over the last few weeks.  She reports that knee pain is not as significant.  However, she does report of lower back pain at the left lumbosacral greater than right side.  She does have pain with left sidebend and rotation.  There is also significant ROM limitation into right rotation.  Performed FDN at bilateral lumbosacral paraspinal/multifidus and did report of pain relief.  Will continue to work into strengthening.  Did ask the patient's granddaughter to get the patient back into [facility] geriatric exercise program.  She reports that patient is staying with her a few days per week.  However, she does report that they are rearranging things since her mother passed away.    Leslie is progressing well towards her goals.   Patient prognosis is Fair.     Patient will continue to benefit from skilled outpatient physical therapy to address the deficits listed in the problem list box on initial evaluation, provide pt/family education and to maximize patient's level of independence in the home and community environment.     Patient's spiritual, cultural and educational needs considered and pt agreeable to plan of care and goals.     Anticipated Barriers for therapy: Anxiety or Panic Disorders, Arthritis, Back pain, Depression, Headaches, High  Blood Pressure, Kidney, Bladder, Prostate or Urination Problems, Osteoporosis, Prior Surgery, Stroke or TIA, Visual Impairment      Goals: Reviewed:2/19/2024       Short Term Goals: In 4 weeks   1.Patient to be educated on HEP. - met  2. Patient  to increase lumbar spine ROM to B SB 30 deg, B Rot 75% - PC  3. Patient  to increase hip PROM to 60 deg ER bilaterally, IR 20 deg bilaterally, in order to assist with more normalized gait pattern. - met  4. Patient  to increase B LE and core strength to 4-/5 in  order to improve gait and balance.  - PC  5. Patient to increased right knee extn ROM to -5 deg for improved gait mechanics- PC  6. Patient   to have pain less than 2/10 at worst, to improve QOL. - PC  7. Patient  to improve score on the FOTO, to improve QOL.- PC  8. Patient  to be educated on postural/body mechanics awareness. - PC     Long Term Goals: In 8 weeks  1.Patient to improve score on the FOTO to 48 or better, to improve QOL. - PC  2. Patient to demo increase in LE strength to 4/5, in order to improve endurance and increase ability to ambulate for increased time. - PC  3. Patient to have decreased pain to 2/10 at worst, to improve QOL. - met  4. Patient to demo increase lumbar ROM to ,  SB 30 deg, B Rot 90%in order to improve available range of motion for ADL's. - PC  .  5. Patient  to increase hip PROM to Extn +5 deg,  60 deg ER bilaterally, IR 20 deg bilaterally, in order to assist with more normalized gait pattern. - PC  6. Patient to increased right knee extn ROM to -5 deg for improved gait mechanics - PC  6. Patient to perform daily activities without increased symptoms including:  Prolonged walking x 10 minutes. - met  Ascending.descending 6 inch step without upper extremities assistance - PC  Return to gym and work outings 2 times per week with family transportation - met         Plan     Would like to extend treatment for 2 more months to allow the patient to return to an exercise regimen as she has had limited exercises over the last few weeks with the recent death of the patient's daughter.      Leoncio Lujan, PT

## 2024-02-20 NOTE — PLAN OF CARE
[facility]   Physical Therapy Progress Note        Name: Leslie CASTELLON [facility] Number: [id number]    Therapy Diagnosis:   Encounter Diagnoses   Name Primary?    Sacroiliitis, not elsewhere classified Yes    Decreased strength, endurance, and mobility     Gait abnormality        Physician: Marily Millan NP    Visit Date: 2/19/2024    Physician Orders: PT Eval and Treat   Medical Diagnosis from Referral:   Late effect of cerebrovascular accident (CVA)   Hemiplegia and hemiparesis following cerebral infarction affecting left non-dominant side   Gait difficulty   Evaluation Date: 11/16/2023  Authorization Period Expiration: 10/16/2024  Plan of Care Expiration: 4/19/2024  Progress Note Due: 3/19/2024  Visit # / Visits authorized: 12/12 (+3)   FOTO: 1/3      Precautions: Standard and Fall (1/8/24 - patient reports history of 3 back fusion surgeries)  PTA Visit #: 0/5     Time In: 10:00 am  Time Out: 11:00 am  Total Billable Time: 60 minutes   (Billing reflects 1 on 1 treatment time spent with patient)    Patient reports: that she experiences pain at the lower back today.  She reports that pain is worse at the left lumbosacral.  The patient's granddaughter adjoined the patient and reported the her mother (the patient's daughter) recently passed away.  She does report that it was rather sudden and reports that she has been unable to get the patient to exercise regularly.    He/She was partially compliant with home exercise program.  Response to previous treatment: some pain relief  Functional change: Some increased speed with walking    Pain: 7/10     Location: lumbar paraspinal region    Objective      Objective Measures updated at progress report or POC update only unless otherwise noted.     Lumbar Spine ROM: %, BB 75%, R %, L SB 75% left sided pain, R Rot 50%, L Rot 66% pain left lumbar region    Palpation: Trigger points on palpation of bilateral lumbosacral  "paraspinal/multifidus    Treatment     Leslie received the treatments listed below:     THERAPEUTIC EXERCISES to develop strength, endurance, ROM, flexibility, posture, and core stabilization for (30) minutes including:    Intervention Performed Today    Nustep with moist heat pack at low back x  10 minutes, L3   Lower trunk rotations x 10 second holds x 10   Pelvic tilts x Anterior/posterior with cueing- 10 second holds x 2 min   Single knee to chest  10 second holds x 10   Sitting flexion with swiss ball  10x   Isometric adduction with ball   3  min   Abdominal brace x With bilateral knee to chest and slowly lowering - 2x10   Supine x Active straight leg raise - 2x10       Leslie participated in neuromuscular re-education activities to improve: Balance, Coordination, Proprioception, and Posture for 15 minutes. The following activities were included:      Intervention 12/12/2023  Parameters   Sit to stand x 10x/ 2 sets hands together   Prone hip extension x 10x with cues   Sidelying hip series x 2x10 abduction  10x/ 2 sets clamshell   Bridging x 2x10                       Plan for Next Visit: Progress as indicated        MANUAL THERAPY TECHNIQUES were applied for (15) minutes, including:    Manual Intervention Performed Today    Soft Tissue Mobilization x  STM bilateral multifidus and gluteal/ piriformis,   Joint Mobilizations x PA glides lumbosacral   Mobilization with movement     Muscle energy techniques   "Shotgun" for + SI provocation   Functional Dry Needling  x FDN performed to the left lumbosacral paraspinal/multifidus     Plan for Next Visit:         Patient Education and Home Exercises       Home Exercises Provided and Patient Education Provided     Education provided: (5) minutes  Moist heat pack to low back prior to exercises at home.    Written Home Exercises Provided: yes.  Exercises were reviewed and Leslie was able to demonstrate them prior to the end of the session.  Leslie demonstrated fair  understanding " of the education provided. See EMR under Patient Instructions for exercises provided during therapy sessions.    Assessment     Patient returns after bereaving the loss of her daughter over the last few weeks.  She reports that knee pain is not as significant.  However, she does report of lower back pain at the left lumbosacral greater than right side.  She does have pain with left sidebend and rotation.  There is also significant ROM limitation into right rotation.  Performed FDN at bilateral lumbosacral paraspinal/multifidus and did report of pain relief.  Will continue to work into strengthening.  Did ask the patient's granddaughter to get the patient back into [facility] geriatric exercise program.  She reports that patient is staying with her a few days per week.  However, she does report that they are rearranging things since her mother passed away.    Leslie is progressing well towards her goals.   Patient prognosis is Fair.     Patient will continue to benefit from skilled outpatient physical therapy to address the deficits listed in the problem list box on initial evaluation, provide pt/family education and to maximize patient's level of independence in the home and community environment.     Patient's spiritual, cultural and educational needs considered and pt agreeable to plan of care and goals.     Anticipated Barriers for therapy: Anxiety or Panic Disorders, Arthritis, Back pain, Depression, Headaches, High  Blood Pressure, Kidney, Bladder, Prostate or Urination Problems, Osteoporosis, Prior Surgery, Stroke or TIA, Visual Impairment      Goals: Reviewed:2/19/2024       Short Term Goals: In 4 weeks   1.Patient to be educated on HEP. - met  2. Patient  to increase lumbar spine ROM to B SB 30 deg, B Rot 75% - PC  3. Patient  to increase hip PROM to 60 deg ER bilaterally, IR 20 deg bilaterally, in order to assist with more normalized gait pattern. - met  4. Patient  to increase B LE and core strength to 4-/5 in  order to improve gait and balance.  - PC  5. Patient to increased right knee extn ROM to -5 deg for improved gait mechanics- PC  6. Patient   to have pain less than 2/10 at worst, to improve QOL. - PC  7. Patient  to improve score on the FOTO, to improve QOL.- PC  8. Patient  to be educated on postural/body mechanics awareness. - PC     Long Term Goals: In 8 weeks  1.Patient to improve score on the FOTO to 48 or better, to improve QOL. - PC  2. Patient to demo increase in LE strength to 4/5, in order to improve endurance and increase ability to ambulate for increased time. - PC  3. Patient to have decreased pain to 2/10 at worst, to improve QOL. - met  4. Patient to demo increase lumbar ROM to ,  SB 30 deg, B Rot 90%in order to improve available range of motion for ADL's. - PC  .  5. Patient  to increase hip PROM to Extn +5 deg,  60 deg ER bilaterally, IR 20 deg bilaterally, in order to assist with more normalized gait pattern. - PC  6. Patient to increased right knee extn ROM to -5 deg for improved gait mechanics - PC  6. Patient to perform daily activities without increased symptoms including:  Prolonged walking x 10 minutes. - met  Ascending.descending 6 inch step without upper extremities assistance - PC  Return to gym and work outings 2 times per week with family transportation - met         Plan     Would like to extend treatment for 2 more months to allow the patient to return to an exercise regimen as she has had limited exercises over the last few weeks with the recent death of the patient's daughter.      Leoncio Lujan, PT

## 2024-02-21 ENCOUNTER — CLINICAL SUPPORT (OUTPATIENT)
Dept: REHABILITATION | Facility: HOSPITAL | Age: 84
End: 2024-02-21
Payer: MEDICARE

## 2024-02-21 DIAGNOSIS — R53.1 DECREASED STRENGTH, ENDURANCE, AND MOBILITY: ICD-10-CM

## 2024-02-21 DIAGNOSIS — R68.89 DECREASED STRENGTH, ENDURANCE, AND MOBILITY: ICD-10-CM

## 2024-02-21 DIAGNOSIS — R29.898 FINE MOTOR IMPAIRMENT: ICD-10-CM

## 2024-02-21 DIAGNOSIS — I69.354 HEMIPLEGIA AND HEMIPARESIS FOLLOWING CEREBRAL INFARCTION AFFECTING LEFT NON-DOMINANT SIDE: ICD-10-CM

## 2024-02-21 DIAGNOSIS — Z74.09 DECREASED STRENGTH, ENDURANCE, AND MOBILITY: ICD-10-CM

## 2024-02-21 DIAGNOSIS — R41.841 COGNITIVE COMMUNICATION DISORDER: Primary | ICD-10-CM

## 2024-02-21 DIAGNOSIS — R29.898 DECREASED GRIP STRENGTH OF LEFT HAND: ICD-10-CM

## 2024-02-21 DIAGNOSIS — R29.818 FINE MOTOR IMPAIRMENT: ICD-10-CM

## 2024-02-21 DIAGNOSIS — Z78.9 DECREASED ACTIVITIES OF DAILY LIVING (ADL): ICD-10-CM

## 2024-02-21 DIAGNOSIS — I69.30 LATE EFFECT OF CEREBROVASCULAR ACCIDENT (CVA): Primary | ICD-10-CM

## 2024-02-21 PROCEDURE — 97112 NEUROMUSCULAR REEDUCATION: CPT

## 2024-02-21 PROCEDURE — 97129 THER IVNTJ 1ST 15 MIN: CPT | Performed by: SPEECH-LANGUAGE PATHOLOGIST

## 2024-02-21 PROCEDURE — 97110 THERAPEUTIC EXERCISES: CPT

## 2024-02-21 PROCEDURE — 97130 THER IVNTJ EA ADDL 15 MIN: CPT | Performed by: SPEECH-LANGUAGE PATHOLOGIST

## 2024-02-21 PROCEDURE — 97530 THERAPEUTIC ACTIVITIES: CPT

## 2024-02-21 NOTE — PLAN OF CARE
[facility]   Occupational Therapy Updated Plan of Care     Date: 2/21/2024  Name: Leslie CASTELLON [facility] Number: [id number]    Therapy Diagnosis:   Encounter Diagnoses   Name Primary?    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     Decreased  strength of left hand     Decreased strength, endurance, and mobility     Decreased activities of daily living (ADL)     Fine motor impairment      Physician: Marily Millan NP    Physician Orders: Occupational Therapy to Evaluate and Treat   Medical Diagnosis: R29.898 (ICD-10-CM) - Poor fine motor skills  Surgical Procedure and Date: N/A     Evaluation Date: 11/8/2023  Insurance Authorization Period Expiration: 11/8/2023 - 12/31/2023  Plan of Care Certification Period: 11/8/2023 - 02/08/2024  Updated Plan of Care Certification Period: 02/08/2024 - 05/21/2024  Progress Note Due: 3/21/2024     Date of Return to MD: N/A     Visit # / Visits authorized: 3/25 (9 Episode Visits)  FOTO: 1/3     Precautions:  Standard     Time In: 9:50  Time Out: 10:35  Total Treatment Time: 45 minutes  Total Billable Time: 45 minutes      Subjective     Date of Onset: First CVA was in 2020 with Hemiparesis, TIA in December of 2022  Mechanism of Injury/ History of Current Condition: Patient reports that she had a CVA in 2020 that impacted her left upper extremity strength. She also had a decrease in right upper extremity at that time as well. Her right upper extremity strength returned but her left has not. She is having difficulty with fine motor tasks and is left hand dominate. She also has a torn rotator cuff from a previous fall and is interested on strengthening/improving her range of motion if possible. She previously received therapy with occupational therapy, Physical Therapy and Speech Therapy here at the [facility]. She made significant improvements with strength and  independence. However recently she has had a rapid decrease in strength and fell yesterday.    Updated: Patient presents with her granddaughter. Her daughter fell ill a few weeks ago and passed away. The patient has has some decline in cognition, endurance and strength since then.    Involved Side: Left side more than right  Dominant Side: Left    Surgical Procedure: NA  Imaging: NA    Previous Therapy: Receiving Physical Therapy and Speech Therapy     Patient's Goals for Therapy: Patient reported goals are to increase fine motor skills and right range of motion in order to return to prior functional level.    Falls: a few weeks ago due to her dog    Pain:  Pain Related Behaviors Observed: Yes   Functional Pain Scale Rating 0-10:   Average: 7/10   Best: 5/10   Worst: 9/10   Location: Back  Description: Aching and sharp  Aggravating Factors: Bending  Easing Factors: rest    Occupation: Compression garments   Working Presently: Retired      Functional Limitations/Social History:    Prior Level of Function: Independent and pain free with all ADL, IADL, community mobility and functional activities.   Current Level of Function: Needs assistance with some ADL, IADL, community mobility and functional activities with reports of increased pain and need for increased time and frequent breaks.      Limitation of Functional Status as follows:   ADLs/IADLs: See Below in Objective Section    Home/Living Environment : lives with their daughter  Home Access: Single Story  Leisure: Tries to clean and does laundry   DME: rolling walker, standard walker, single point cane, wheelchair, and stool grab bars, toilet riser       Past Medical History/Physical Systems Review:     Past Medical History:  Leslie Wood  has a past medical history of Arthritis, Cataract, GERD (gastroesophageal reflux disease), Heart attack, Heart attack, Hyperlipidemia, Hypertension, and Stroke.    Past Surgical History:  Leslie CASTELLON [place]  has a past surgical  history that includes Back surgery; Eye surgery; Appendectomy; Adrenal gland surgery; Hemorrhoid surgery; Hernia repair; Hysterectomy; Tonsillectomy; Adenoidectomy; indirect lumbar decompression; Left heart catheterization (Left, 05/20/2022); Coronary angiography (N/A, 05/20/2022); and Coronary angiography (N/A, 05/24/2022).    Current Medications:  Leslie has a current medication list which includes the following prescription(s): aspirin, atorvastatin, duloxetine, furosemide, losartan, metoprolol succinate, mupirocin, nucynta, tizanidine, [DISCONTINUED] carvedilol, [DISCONTINUED] clonidine, [DISCONTINUED] diclofenac sodium, [DISCONTINUED] isosorbide mononitrate, [DISCONTINUED] metoprolol tartrate, [DISCONTINUED] nitroglycerin, and [DISCONTINUED] valsartan.    Allergies:  Review of patient's allergies indicates:   Allergen Reactions    Amitriptyline     Hydrochlorothiazide      Causes muscle cramping    Lisinopril      hyperkalemia    Oxycodone     Percocet [oxycodone-acetaminophen] Other (See Comments)     Seizures    Belbuca [buprenorphine hcl] Nausea And Vomiting and Other (See Comments)     Black out     Codeine Nausea Only and Rash    Prazosin Other (See Comments)     dizziness        Objective     Activities of Daily Living:  Feeding: Modified Independent  Grooming: Stand-by Assist  Hygiene: Stand-by Assist  Upper Body Dressing: Minimal Assist  Lower Body Dressing: Moderate Assist  Toileting: Minimal Assist  Bathing: Moderate Assist    Instrumental Activities of Daily Living:  Homecare: Maximal Assist  Cooking: Maximal Assist  Laundry: Maximal Assist  Yard Work: Maximal Assist  Use of Telephone: Supervision  Money Management: Maximal Assist  Medication Management: Maximal Assist  Handwriting: Minimal Assist  Technology Use: Moderate Assist      Cognitive Exam:  Oriented: Person, Place, Time, and Situation  Behaviors: normal, cooperative  Follows Commands/Attention: Easily distracted  Communication:  clear/fluent  Memory: Impaired STM as determined by 3 word recall after 1 minute and 3 minutes  Safety Awareness/Insight to Disability: choosing to repress/ignore implications of diagnosis, treatment, and prognosis  Coping Skills/Emotional Control: Appropriate to situation    Physical Exam:    Postural Examination/Scapula Alignment: Rounded shoulder and Head forward      Joint Evaluation  AROM  2/21/2024 AROM  2/21/2024 PROM   2/21/2024 Pain/Dysfunction with movement Goal  Right    Left Right Right  Active    Shoulder Flexion 0-180 Within normal limits  80 105  110   Shoulder Abduction 0-180  80 105  110   Shoulder External Rotation 0-90  25 40     Shoulder Internal Rotation 0-90  45 60     Shoulder Extension 0-60  10 25     Shoulder Horizontal Adduction 0-90  Within normal limits  Within normal limits      Elbow Flexion 0-150        Elbow Extension 0        Wrist Flexion 0-80        Wrist Extension 0-70        Wrist Supination 0-80        Wrist Pronation 0-80        Wrist Ulnar Deviation 0-30        Wrist Radial Deviation 0-20          Fist: normal      Strength 2/21/2024 2/21/2024    **within available ROM** Left Right Goal Left   Shoulder Flexion 3+/5 3-/5 4/5   Shoulder Abduction 3+/5 3-/5 4/5   Shoulder External Rotation  3+/5 3-/5 4/5   Shoulder Internal Rotation 3+/5 3-/5 4/5   Shoulder Extension 3+/5 3-/5 4/5   Shoulder Horizontal Adduction 3+/5 3-/5 4/5   Elbow Flexion 3+/5 3+/5 4/5   Elbow Extension 3+/5 3+/5 4/5   Wrist Flexion 3+/5 3+/5 4/5   Wrist Extension 3+/5 3+/5 4/5   Supination 3+/5 3+/5 4/5   Pronation 3+/5 3+/5 4/5   Ulnar Deviation 3+/5 3+/5 4/5   Radial Deviation 3+/5 3+/5 4/5      Strength: (MUNDO Dynamometer in lbs.) Average 3 trials, Position II:     2/21/2024 2/21/2024 Goal    Left Right Left   Rung II 25# 30# 30#     Pinch Strength (Measured in psi)     2/21/2024 2/21/2024    Left Right   2pt Pinch 4 psi 7 psi   3pt Pinch 4 psi 7 psi   Lateral Pinch 7 psi 10 psi     Coordination:   -      Fine Motor Coordination: impaired  -     Upper Extremity Coordination: intact  -     Lower Extremity Coordination: intact    Sensation:  Leslie  reports normal sensation    -    Light touch: bilateral intact    -    Sharp/Dull: bilateral intact     Balance:     -   Static Sit - GOOD-: Takes MODERATE challenges from all directions but inconsistently    -   Dynamic Sit- GOOD-: Takes MODERATE challenges from all directions but inconsistently    -   Static Stand - POOR+: Needs MINIMAL assist to maintain    -   Dynamic Stand - POOR+: Needs MINIMAL assist to maintain    Endurance Deficit: moderate                   CMS Impairment/Limitation/Restriction for FOTO Stroke [facility]    Therapist reviewed FOTO scores for Leslie Wood on 2/21/2024.   FOTO documents entered into [employer] - see Media section.    Limitation Score: 44%         Treatment     Total Treatment time separate from Evaluation time: 45 Minutes    Please see today's treatment note for 45 minute session       Home Exercise Program/Education:    Education provided:   Patient educated on the impairments noted above and the effects of Occupational therapy intervention to improve overall condition and Quality of Life.   Patient was educated on all the above exercise prior/during/after for proper posture, positioning, and execution for safe performance with home exercise program.  Patient/Family Education: role of Occupational Therapy, goals for Occupational Therapy, scheduling/cancellations, and insurance limitations - patient verbalized understanding  Home Exercise Program - See Below    Written Home Exercises Provided: yes.  Exercises were reviewed and Leslie was able to demonstrate them prior to the end of the session. Patient was advised to perform these exercises free of pain, and to stop performing them if pain occurs.  Leslie demonstrated good  understanding of the education provided.     See EMR under Patient Instructions for exercises provided  11/8/2023  .    Assessment       Leslie Wood is a 83 y.o. 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    Following medical record review it is determined that pt will benefit from occupational therapy services in order to maximize pain free and/or functional use of bilateral upper extremities. The following goals were discussed with the patient and patient is in agreement with them as to be addressed in the treatment plan. The patient's rehab potential is Fair.     Anticipated barriers to occupational therapy: Cognition, Pain    Plan of care discussed with patient: Yes  Patient's spiritual, cultural and educational needs considered and patient is agreeable to the plan of care and goals as stated below:     Medical Necessity is demonstrated by the following  Occupational Profile/History  Co-morbidities and personal factors that may impact the plan of care [] LOW: Brief chart review  [x] MODERATE: Expanded chart review   [] HIGH: Extensive chart review    Moderate / High Support Documentation:   Past Medical History:   Diagnosis Date    Arthritis     Cataract     GERD (gastroesophageal reflux disease)     Heart attack 05/18/2022    Heart attack 05/23/2022    Hyperlipidemia     Hypertension     Stroke         Examination  Performance deficits relating to physical, cognitive or psychosocial skills that result in activity limitations and/or participation restrictions  [] LOW: addressing 1-3 Performance deficits  [x] MODERATE: 3-5 Performance deficits  [] HIGH: 5+ Performance deficits (please support below)    Moderate / High Support Documentation:    Physical:  Joint Mobility  Joint Stability  Muscle Power/Strength  Muscle Endurance  Skin Integrity/Scar Formation   Strength  Pinch Strength  Gross Motor Coordination  Fine  Motor Coordination  Proprioception Functions  Tactile Functions  Pain    Cognitive:  Attention  Memory  Safety Awareness/Insight to Disability    Psychosocial:    No Deficits     Treatment Options [] LOW: Multiple options  [x] MODERATE: Several options  [] HIGH: Limited options      Decision Making/ Complexity Score: moderate       The following goals were discussed with the patient and patient is in agreement with them as to be addressed in the treatment plan.     Goals:    Short Term Goals:  6 weeks  Progress    Pain: Patient will demonstrate improved pain by reports of less than or equal to 7/10 worst pain on the verbal rating scale in order to progress toward maximal functional ability and improve quality of life. PC   Function: Patient will demonstrate improved function as indicated by a functional limitation score of less than or equal to 46 out of 100 on FOTO. PC   Mobility: Patient will improve active range of motion to 50% of stated goals, listed in objective measures above, in order to progress towards independence with functional activities.  PC   Strength: Patient will improve strength to 50% of stated goals, listed in objective measures above, in order to progress towards independence with functional activities.  PC   HEP: Patient will demonstrate independence with home exercise program in order to progress toward functional independence. PC     Long Term Goals:  12 weeks  Progress   Pain: Patient will demonstrate improved pain by reports of less than or equal to 6/10 worst pain on the verbal rating scale in order to progress toward maximal functional ability and improve quality of life.   PC   Function: Patient will demonstrate improved function as indicated by a functional limitation score of less than or equal to 48 out of 100 on FOTO. PC   Mobility: Patient will improve active range of motion to stated goals, listed in objective measures above, in order to return to maximal functional potential and  improve quality of life. PC   Strength: Patient will improve strength to stated goals, listed in objective measures above, in order to improve functional independence and quality of life. PC   Patient will return to normal ADL's, IADL's, community involvement, recreational activities, and work-related activities with less than or equal to 5/10 pain and maximal function.  PC     Goals Key:  PC= Progressing/Continue; PM= Partially Met;  GM= Goal Met,      DC= Discontinue    Plan   Certification Period/Plan of care expiration: 02/08/2024 to 05/21/2024.       Outpatient Occupational Therapy 1 -2 times weekly through 05/21/2024 to include the following interventions: Therapeutic Exercise, Functional Activities, Patient Education, Home Exercise Program, ADL Training, Transfer/Mobility Training, Manual Therapy, Neuromuscular Reeducation/Sensory, Electrical Stimulation/TENS/Interferential, Moist Heat/Ice/Paraffin, Functional Standing, Cognitive Preceptual Retraining, and Orthotic Fabrication/Fit/Management.    Ivana Gallo, OT ,OTD, OTR/L      I CERTIFY THE NEED FOR THESE SERVICES FURNISHED UNDER THIS PLAN OF TREATMENT AND WHILE UNDER MY CARE  Physician's comments:      Physician's Signature: ___________________________________________________

## 2024-02-21 NOTE — PROGRESS NOTES
[facility]  Occupational Therapy Treatment Note     Date: 2/21/2024  Name: Leslie CASTELLON [facility] Number: [id number]    Therapy Diagnosis:     Encounter Diagnoses   Name Primary?    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     Decreased  strength of left hand     Decreased strength, endurance, and mobility     Decreased activities of daily living (ADL)     Fine motor impairment        Physician: Marily Millan NP    Physician Orders: Occupational Therapy to Evaluate and Treat   Medical Diagnosis: R29.898 (ICD-10-CM) - Poor fine motor skills  Surgical Procedure and Date: N/A     Evaluation Date: 11/8/2023  Insurance Authorization Period Expiration: 11/8/2023 - 12/31/2023  Plan of Care Certification Period: 11/8/2023 - 02/08/2024  Updated Plan of Care Certification Period: 02/08/2024 - 05/21/2024  Progress Note Due: 3/21/2024     Date of Return to MD: N/A     Visit # / Visits authorized: 3/25 (9 Episode Visits)  FOTO: 1/3     Precautions:  Standard    Time In: 9:50  Time Out: 10:35  Total Treatment Time: 45 minutes  Total Billable Time: 45 minutes    Subjective     Patient reports: She is doing okay. Her daughter was ill and passed away which is why she missed several sessions.    she was compliant with home exercise program given last session.   Response to previous treatment: Tolerated well  Functional change: Improved knowledge of HEP    Pain: 6/10  Location: bilateral hands     Objective     Please see updated plan of care dated 2/21/2024    Treatment     Supervised Modalities: Modalities such as hot/cold packs, traction, unattended electrical stimulation, paraffin bath, fluidotherapy to reduce pain and increase soft tissue extensibility. Leslie received (0) minutes of modalities listed below after being cleared for contraindications.  x = modalities performed     Supervised Modalities  2/21/2024                            Manual Therapy Techniques: Joint mobilizations, Soft tissue Mobilization, and mobilization with movement applied to the area listed below. Leslie received (0) minutes of interventions. x = intervention performed today    Manual Intervention 2/21/2024    Soft Tissue Mobilization     Joint Mobilizations     Mobilization with movement              Therapeutic Exercises: to develop strength, endurance, ROM, flexibility, posture and core stabilization. Leslie received (15) minutes of exercises listed below. x = performed today * = level of progression of activity     Therapeutic Exercise 2/21/2024    Interossei  -green foam  -rubber bands x 20x bilateral    Digi flexion - red x 20x bilateral    Resistive pinch - red  -2pt  -3pt  -lateral x 20x bilateral    Thumb palmar   -adduction  -abduction x 20x bilateral    Thumb radial  -adduction  -abduction x 20x bilateral    Forearm exercise  - wrist flexion  -wrist extension  - supination  -pronation x 2lb        Therapeutic Activities: Everyday tasks to address the combined need of improved strength, range of motion, and endurance to achieve increased independence. While simulating these activities, the person is applying the gained skills of strength and improved range of motion in a practical way.  Leslie received (15) minutes of activities listed below. x = activities performed today * = level of progression of activity     Therapeutic Activities 2/21/2024    Velcro board  -lateral pinch  - dowel  x 20x bilateral    Opening and closing containers x 5 minutes, ergonomics               Neuromuscular Re-education: the use of functional strengthening, stretching, balancing and coordination activities that train the nerves and muscles to react and communicate to restore normal body movement patterns to increase self care independence. Leslie received (15) minutes of interventions listed below.  x = interventions performed today * = level of  progression of activity     Neuromuscular Re-education 2/21/2024    Finger opposition  x 20x   Finger taps (extension) x 20x   Finger opposition with slides x    Table Top active assist range of motion  - shoulder flexion  - shoulder abduction  x 5x - 15 second holds     Self Care: Fundamental skills required to independently care for oneself. Activities of daily living such as eating, bathing, dressing, toileting, grooming, sleeping, transfers and mobility. Instrumental activities of daily living such as driving, medication management, pet care, home management/cleaning, financial management, using the phone, meal preparation and shopping. Leslie received (0) minutes of interventions listed below.  x = interventions performed * = level of progression of activity     Self Care 2/21/2024                            Home Exercises and Education Provided     Education provided:   - home exercise program education provided  - progress towards goals     Written Home Exercises Provided: Patient instructed to cont prior HEP.  Exercises were reviewed and Leslie was able to demonstrate them prior to the end of the session. Leslie demonstrated good understanding of the home exercise program provided.     See EMR under Patient Instructions for exercises provided prior visit.        Assessment     Patient tolerated exercises well. Needs verbal cues for hand placement.     Leslie is progressing towards her goals and there are no updates to goals at this time. Patient prognosis is Good.     Patient will continue to benefit from skilled outpatient occupational therapy to address the deficits listed in the problem list on initial evaluation provide patient/family education and to maximize patient's level of independence in the home and community environment.     Patient's spiritual, cultural and educational needs considered and patient agreeable to plan of care and goals.    Anticipated barriers to occupational therapy: Cognition, home  exercise program compliance    Goals:     Short Term Goals:  6 weeks  Progress    Pain: Patient will demonstrate improved pain by reports of less than or equal to 7/10 worst pain on the verbal rating scale in order to progress toward maximal functional ability and improve quality of life. PC   Function: Patient will demonstrate improved function as indicated by a functional limitation score of less than or equal to 46 out of 100 on FOTO. PC   Mobility: Patient will improve active range of motion to 50% of stated goals, listed in objective measures above, in order to progress towards independence with functional activities.  PC   Strength: Patient will improve strength to 50% of stated goals, listed in objective measures above, in order to progress towards independence with functional activities.  PC   HEP: Patient will demonstrate independence with home exercise program in order to progress toward functional independence. PC      Long Term Goals:  12 weeks  Progress   Pain: Patient will demonstrate improved pain by reports of less than or equal to 6/10 worst pain on the verbal rating scale in order to progress toward maximal functional ability and improve quality of life.   PC   Function: Patient will demonstrate improved function as indicated by a functional limitation score of less than or equal to 48 out of 100 on FOTO. PC   Mobility: Patient will improve active range of motion to stated goals, listed in objective measures above, in order to return to maximal functional potential and improve quality of life. PC   Strength: Patient will improve strength to stated goals, listed in objective measures above, in order to improve functional independence and quality of life. PC   Patient will return to normal ADL's, IADL's, community involvement, recreational activities, and work-related activities with less than or equal to 5/10 pain and maximal function.  PC      Goals Key:  PC= Progressing/Continue;       PM=  Partially Met;       GM= Goal Met,      DC= Discontinue    Plan     Continue Plan of Care (POC) and progress per patient tolerance.      Ivana Gallo, OT  ,OTD, OTR/L

## 2024-02-21 NOTE — PLAN OF CARE
[facility]  Speech Therapy Updated Plan of Care-Neurological Rehabilitation         Date: 2/21/2024   Name: Leslie Wodo  Clinic Number: [id number]    Therapy Diagnosis:   Encounter Diagnosis   Name Primary?    Cognitive communication disorder Yes     Physician: Marily Millan NP    Physician Orders: Ambulatory referral/consult to speech therapy   Medical Diagnosis: Disorientation [R41.0], Aphasia [R47.01]     Visit #/ Visits Authorized:  3 /12   Evaluation Date: 11/8/23  Insurance Authorization Period: 1/2/2024 - 12/31/2024   Plan of Care Expiration:    1/31/24  New POC Certification Period:  2/21/24-5/1/24     Total Visits Received: 10    Precautions:Standard, Fall, Cognition, and Communication  Subjective     Update: Patient was previously attending therapy with progress toward all levels of attention goals being addressed. She discontinued services in the mid/end of January 2024 secondary to her daughter's health. Unfortunately, her daughter passed away about 3 weeks ago, and patient is interested in re-initiating services again.     Objective     Update: see follow up note dated 2/21/2024    Assessment     Update: Leslie Wood presents to [facility] status post medical diagnosis of Disorientation [R41.0], Aphasia [R47.01]. Demonstrates impairments including limitations as described in the problem list. Positive prognostic factors include family support, patient motivation to improve. Negative prognostic factors include progress prior. She presents with moderate-severe cognitive communication disorder charaterized by deficits across all domains of cognitive-communication assessed in today's session. Primary deficits appear to be related to both auditory and visual attention which are impacting her ability to attend to, encode/process information, store and then retrieve information. This is consistent with known right hemisphere infarcts. She also is notably very distracted  by internal and external distractors which negatively impacted her performance during testing. She cried multiple times throughout assessment related to performance on subtests which further negatively impacted subsequent subtests. This was demonstrated in attention, immediate recall and delayed recall scores. This was also evidenced in semantic fluency subtest as poor attention impacted thought organization for naming objects in a category. Visuospatial skills also appeared negatively impacted by attention with L inattention noted during coding subtest requiring cueing for L edge of the page. Given significant impact of attention in today's session in quiet/distraction-free environment, deficits are likely to be exacerbated in distracting environment. Deficits also likely to result in significant safety concern for execution of daily tasks accurately or efficiently. May complete further right hemisphere evaluation in next session as indicated. No barriers to therapy identified.. Patient will benefit from skilled, outpatient rehabilitation speech therapy.    Rehab Potential: good   Pt's spiritual, cultural, and educational needs considered and patient agreeable to plan of care and goals.    Education: Plan of Care and role of SLP in care     Previous Short Term Goals Status: 6 weeks  Short Term Goals: (6 weeks)   Patient will sustain attention to a simple task (auditory or visual) for 3 minutes with 90% accuracy or no more than 2 redirections.      Goal Met 12/13/2023   Auditory: met x2  Visual: met x7   2. Patient will complete selective attention tasks (auditory or visual) with 85% accuracy independently increase selective attention.      Progressing/ Not Met 2/21/2024   Auditory: met x5  Visual: met x1   3. Patient will use attention shifting strategies to shift attention between two tasks (auditory or visual) with no more than 3 cues or 90% accuracy to improve alternating attention.      Progressing/ Not Met  2/21/2024    4. Patient will recall functional information (name, birthday, address, etc) following delays of 2-3 minutes following a prompt questions across 3 therapy sessions with 80% accuracy (spaced retrieval).      Progressing/ Not Met 2/21/2024     5. Patient will complete a functional task to improve attention, memory and/or executive functions (I.e. sample bill paying activity, recipe, or multiple choice comprehension questions to 1 paragraphs) with 80% accuracy and natural environment noise distractions (TV news background, music, etc.).      Progressing/ Not Met 2/21/2024      6. Patient will be educated regarding cognitive deficits as applicable to the patient's life for increased awareness and will execute returned demonstration of information with 80% accuracy.       Goal Met 1/24/2024    7. Patient will participate in continued cognitive assessment of right hemisphere dysfunction.     Goal Met 11/24/2023          New Short Term Goals:   See previously unmet short term goals      Long Term Goal Status:    Patient will improve attention skills to effectively attend to and communicate in simple daily living tasks in functional living environment.     Goals Previously Met:  NA     Reasons for Recertification of Therapy: Patient has continued to progress toward all goals addressed. Her services were held at previous appointment secondary to her daughter's health and patient not having transportation for therapy. Unfortunately, her daughter passed away ~3 weeks ago and she is now interested in re-initiating services. She continues to benefit from therapy to improve cognitive functioning and therefore overall daily task completion accurately and effectively.      Plan     Updated Certification Period: 2/21/2024 to 5/1/24    Recommended Treatment Plan: Patient will participate in the [facility] rehabilitation program for speech therapy 2 times per week to address her Cognition deficits, to educate patient and  their family, and to participate in a home exercise program.     Other recommendations: NA     Therapist's Name:  Rabia Lane MA, CCC-SLP  Speech Language Pathologist  2/21/2024      I CERTIFY THE NEED FOR THESE SERVICES FURNISHED UNDER THIS PLAN OF TREATMENT AND WHILE UNDER MY CARE      Physician Name: _______________________________    Physician Signature: ____________________________

## 2024-02-21 NOTE — PROGRESS NOTES
[facility]  Speech Therapy Treatment Note- Neurological Rehabilitation  Date: 2/21/2024     Name: Leslie Wood   MRN: [id number]   Therapy Diagnosis:   Encounter Diagnosis   Name Primary?    Cognitive communication disorder Yes   Physician: Marily Millan NP  Physician Orders: Ambulatory Referral to Speech Therapy   Medical Diagnosis: Disorientation [R41.0], Aphasia [R47.01]     Visit #/ Visits Authorized: 3/ 12 (+eval, +5)  Date of Evaluation:  11/8/23  Insurance Authorization Period: 11/15/2023 - 12/31/2023   Plan of Care Expiration Date:    1/31/24  Extended Plan of Care:  2/21/24-5/1/24  Progress Note: 3/21/24     Time In:  8:23 AM  Time Out:  9:01 AM  Total Billable Time: 38 mins      Precautions: Standard, Fall, Cognition, and Communication  Subjective:   Patient reports: she has been doing okay. She has missed several weeks of therapy secondary to her daughter passing away at the beginning of February.  She was compliant to home exercise program.     Response to previous treatment: NA    Pain Scale: 9/10 on a Visual Analog Scale currently.  Pain Location: back and leg  Objective:   TIMED  Procedure Min.   Cognitive Therapeutic Interventions, first 15 minutes CPT 07619  15   Cognitive Therapeutic Interventions, each additional 15 minutes CPT 05166  23           Short Term Goals: (6 weeks) Current Progress:   Patient will sustain attention to a simple task (auditory or visual) for 3 minutes with 90% accuracy or no more than 2 redirections.      Goal Met 12/13/2023   Auditory: met x2  Visual: met x7 Auditory Sustained Attention:  Several sustained auditory attention tasks completed in today's session. Patient listened to multi-step commands and followed with 91% accuracy. She required initial verbal cueing for use of strategies including taking requesting repetition as needed. Improved use of strategies independently with overall improved accuracy in today's session.    Visual Sustained  Attention:  Patient completed several visual sustained attention tasks in today's session in which she visually scanned for letters/numbers in a paragraph.   Trial 1: 1:30, 100% accuracy, 1/7 relative scale of cognitive load   Trial 2: 1:45, 100% accuracy, 1/7 relative scale of cognitive load   Trial 3: 1:30, 100% accuracy, 1/7 relative scale of cognitive load    Trial 4: 2:08, -7 (missed entire row of scanning)   Trial 5: 2:10, 100% accuracy, 1/7 relative scale of cognitive load   Trial 6: 1:30, 100% accuracy, 1/7 relative scale of cognitive load     Overall improved use of strategies to facilitate visual scanning pattern as well as good awareness of errors. Overall high accuracy of completion with low cognitive load.       2. Patient will complete selective attention tasks (auditory or visual) with 85% accuracy independently increase selective attention.      Progressing/ Not Met 2/21/2024   Auditory: met x5  Visual: met x1 Auditory Selective Attention:  Patient listened for and followed complex 2 step commands in a dynamic environment with auditory and visual distractions during completion. She followed commands with 92% accuracy and initial verbal cueing for use of strategies. Discussion of how this relates to conversation and asking clarifying questions to facilitate attention and information processing. She rated task as 2/7 on relative scale of cognitive load.    She then listened to a 5 minute video about how basketballs are made. She requested to take notes as needed and paused/rewound video as needed. She answered questions about what she heard with 92% accuracy and rated task as 2/7 on relative scale of cognitive load.     Visual Selective Attention:  Patient participated in visual selective attention tasks in today's session in which she visually scanned for target letters in a jumbled paragraphs. She completed x2. Initially, she completed with 50% accuracy and patient independently using strategy of  using pen as a guide to facilitate visual attention. She then completed again, with cues for visual scanning strategy as well as to state target letter aloud and reduce rate as able to facilitate accuracy. She completed second attempt with 100% accuracy and rated task as 4/7 on relative scale of cognitive load.      3. Patient will use attention shifting strategies to shift attention between two tasks (auditory or visual) with no more than 3 cues or 90% accuracy to improve alternating attention.      Progressing/ Not Met 2/21/2024  Not formally addressed    4. Patient will recall functional information (name, birthday, address, etc) following delays of 2-3 minutes following a prompt questions across 3 therapy sessions with 80% accuracy (spaced retrieval).      Progressing/ Not Met 2/21/2024   Not formally addressed     5. Patient will complete a functional task to improve attention, memory and/or executive functions (I.e. sample bill paying activity, recipe, or multiple choice comprehension questions to 1 paragraphs) with 80% accuracy and natural environment noise distractions (TV news background, music, etc.).      Progressing/ Not Met 2/21/2024    Not formally addressed     6. Patient will be educated regarding cognitive deficits as applicable to the patient's life for increased awareness and will execute returned demonstration of information with 80% accuracy.       Goal Met 1/24/2024  Patient and her granddaughter again educated regarding nature of cognitive deficits including a variety of foundational cognitive elements related to R hemisphere dysfunction following CVA including the cognitive triangle (with emphasis on foundational versus higher level cognitive functions, awareness of deficits, impact of attention, information processing, memory, and executive functioning deficits as each area relates to assessment findings), spoon theory to discuss cognitive versus physical versus emotional fatigue and  management of each to more efficiently use energy daily for more or less cognitively demanding tasks, internal versus external distractions (including emotions, pain, stressors, etc) and management of each, as well as overall impact of these deficits on daily functioning. Patient demonstrated awareness of information provided as demonstrated by asking good questions and expressing understanding. Discussed specifically use of mindfulness and meditation/breathing strategies to improve cognitive functioning related to internal, specifically emotional stressors.       7. Patient will participate in continued cognitive assessment of right hemisphere dysfunction.     Goal Met 2023   See results of RICE below.      Patient Education/Response:   Patient educated regarding the followin. Cognitive triangle as it results to assessment findings/impact on functioning   2. Impact of specific right hemisphere dysfunction on reading, inattention/left neglect, ability to complete daily tasks     Home program established: Patient instructed to continue prior program  Patient verbalized understanding to all above education provided.     See Electronic Medical Record under Patient Instructions for exercises provided throughout therapy.  Assessment:   In today's session, significant time spent discussing cognitive deficits again, specifically related to reduced attention impacted negatively by internal and external distractors such as emotions, pain, fatigue, stressors, etc. Discussion of impact of grief on cognitive functioning following her daughter's passing. She expressed understanding. Will work in upcoming sessions to address management of internal/emotional distractions as well as management of daily tasks that patient will now be responsible for such as cooking, household chores, etc.     Leslie is progressing well towards her goals. Current goals remain appropriate. Goals to be updated as necessary.     Patient  prognosis is Fair. Patient will continue to benefit from skilled outpatient speech and language therapy to address the deficits listed in the problem list on initial evaluation, provide patient/family education and to maximize patient's level of independence in the home and community environment.     Medical necessity is demonstrated by the following IMPAIRMENTS:  Cognition: Deficits in executive functioning, attention, and memory prevent the pt from relaying medically and safety relevant information in a timely manner in a state of emergency.     Barriers to Therapy: NA  Patient's spiritual, cultural and educational needs considered and patient agreeable to plan of care and goals.  Plan:   Continue Plan of Care with focus on rehabilitation and compensation for attention, memory, and executive functioning deficits impacting safety and efficiency of daily task completion.     Rabia Lane MA, CCC-SLP  Speech Language Pathologist  2/21/2024

## 2024-02-25 ENCOUNTER — PATIENT MESSAGE (OUTPATIENT)
Dept: ORTHOPEDICS | Facility: CLINIC | Age: 84
End: 2024-02-25
Payer: MEDICARE

## 2024-02-25 ENCOUNTER — HOSPITAL ENCOUNTER (EMERGENCY)
Facility: HOSPITAL | Age: 84
Discharge: HOME | End: 2024-02-25
Attending: EMERGENCY MEDICINE
Payer: MEDICARE

## 2024-02-25 VITALS
HEART RATE: 55 BPM | RESPIRATION RATE: 18 BRPM | OXYGEN SATURATION: 99 % | TEMPERATURE: 98 F | DIASTOLIC BLOOD PRESSURE: 81 MMHG | SYSTOLIC BLOOD PRESSURE: 158 MMHG

## 2024-02-25 DIAGNOSIS — S00.33XA CONTUSION OF NOSE, INITIAL ENCOUNTER: ICD-10-CM

## 2024-02-25 DIAGNOSIS — T14.8XXA ABRASION: ICD-10-CM

## 2024-02-25 DIAGNOSIS — T14.8XXA CONTUSION: ICD-10-CM

## 2024-02-25 DIAGNOSIS — H05.231 PERIORBITAL HEMATOMA OF RIGHT EYE: Primary | ICD-10-CM

## 2024-02-25 DIAGNOSIS — S80.02XA CONTUSION OF LEFT KNEE, INITIAL ENCOUNTER: ICD-10-CM

## 2024-02-25 DIAGNOSIS — S80.811A ABRASION OF RIGHT LOWER EXTREMITY, INITIAL ENCOUNTER: ICD-10-CM

## 2024-02-25 DIAGNOSIS — M25.562 LEFT KNEE PAIN: ICD-10-CM

## 2024-02-25 PROCEDURE — 99284 EMERGENCY DEPT VISIT MOD MDM: CPT | Mod: 25

## 2024-02-25 PROCEDURE — 25000003 PHARM REV CODE 250: Performed by: EMERGENCY MEDICINE

## 2024-02-25 RX ORDER — BACITRACIN ZINC 500 UNIT/G
1 OINTMENT (GRAM) TOPICAL 2 TIMES DAILY
Status: COMPLETED | OUTPATIENT
Start: 2024-02-25 | End: 2024-02-25

## 2024-02-25 RX ORDER — ACETAMINOPHEN 325 MG/1
650 TABLET ORAL
Status: COMPLETED | OUTPATIENT
Start: 2024-02-25 | End: 2024-02-25

## 2024-02-25 RX ADMIN — ACETAMINOPHEN 650 MG: 325 TABLET ORAL at 01:02

## 2024-02-25 RX ADMIN — BACITRACIN ZINC 1 TUBE: 500 OINTMENT TOPICAL at 02:02

## 2024-02-25 NOTE — ED PROVIDER NOTES
Emergency Medicine Provider Note - 2/25/2024       SCRIBE #1 NOTE: I, Ying Wakefield, am scribing for, and in the presence of, Maryanne France DO. I have scribed the entire note.        History     Chief Complaint   Patient presents with    Fall     Pt. Presents to ED via EMS due to tripping and falling today. Pt denies any LOC, and denies being on blood thinners. Pt c/o BL knee pain, and right shoulder pain. Pt also has a hematoma noted to her right orbit/forehead region, along with bruising noted        Allergies:  Review of patient's allergies indicates:   Allergen Reactions    Amitriptyline     Hydrochlorothiazide      Causes muscle cramping    Lisinopril      hyperkalemia    Oxycodone     Percocet [oxycodone-acetaminophen] Other (See Comments)     Seizures    Belbuca [buprenorphine hcl] Nausea And Vomiting and Other (See Comments)     Black out     Codeine Nausea Only and Rash    Prazosin Other (See Comments)     dizziness        History of Present Illness   HPI    2/25/2024, 1:08 PM  The history is provided by the patient and EMS:    Leslie Wodo is a 83 y.o. female presenting to the ED for evaluation after fall.      Report from EMS:  Patient arrived via [facility].  Patient had experienced trip and fall.  Complaining knee pain on the left side as well as right shoulder pain.  No loss of consciousness.  DUARTE: ARRIVED ONSCENE WITH ESFD ATTENDING TO PT SEATED UPRIGHT IN VEHICLE. FAMILY MEMBERS PRESENT OSNCENE. PT HAS INJURIES TO FACE NOTATED AND  EXTREMITIES. PT DOES NOT APPEAR IN OBVIOUS DISTRESS.  HPI: FAMILY MEMBER CONTACTED FOR EMS FOR PT THAT FELL AT [facility]. PT FAMILY WAS ABLE TO GET PT BACK INTO VEHICLE AND WAS GOING TO BRING PT  POV TO BE EVALUATED. PT FAMILY CONTACTED DUE TO PT HEMATOMA TO FOREHEAD SEEMING TO GET BIGGER. PT DENIES LOC WITH FALL. PT REPORTS OF MINOR  PAIN TO RIGHT FOREHEAD TO HEMATOMA, ABRASION TO RIGHT SHOULDER/LEFT KNEE NOTATED.  PMHX: AS LISTED  PE: AS  LISTED  T/D: VS, SPO2, EKG, 12 LEAD, CBG. PT ASSESSED ONSCENE AND ASSISTED ONTO STRETCHER. PT SECURED FOR TRANSPORT. [facility] CONTACTED OF PT CONDITION AND  PT REQUESTING [facility]. UPON ARRIVAL VERBAL REPORT GIVEN TO STAFF OF PT CONDITION. VERBAL REPORT GIVEN AND PT OFFLOADED TO BED 20.    According to the granddaughter:  Patient has gotten up, lost her balance, and fell.  There was no loss of consciousness.  No episodes of emesis. The bruising on her face appeared to be getting worse.     Patient denies any headache, nausea, vomiting, neck pain, paresthesias, chest pain, chest pressure, shortness of breath, abdominal pain, numbness or weakness to 1 side.  Patient believes her tetanus was 2-3 years ago.      Arrival mode: [facility]     PCP: Mckayla Eisenberg MD     Past Medical History:  Past Medical History:   Diagnosis Date    Arthritis     Cataract     GERD (gastroesophageal reflux disease)     Heart attack 05/18/2022    Heart attack 05/23/2022    Hyperlipidemia     Hypertension     Stroke        Past Surgical History:  Past Surgical History:   Procedure Laterality Date    ADENOIDECTOMY      ADRENAL GLAND SURGERY      APPENDECTOMY      BACK SURGERY      fusion l 4-5 s 1,2,3  fusion l 2-3    CORONARY ANGIOGRAPHY N/A 05/20/2022    Procedure: ANGIOGRAM, CORONARY ARTERY;  Surgeon: Domingo Martins MD;  Location: [facility] CATH LAB;  Service: Cardiology;  Laterality: N/A;    CORONARY ANGIOGRAPHY N/A 05/24/2022    Procedure: ANGIOGRAM, CORONARY ARTERY;  Surgeon: Domingo Martins MD;  Location: [facility] CATH LAB;  Service: Cardiology;  Laterality: N/A;    EYE SURGERY      HEMORRHOID SURGERY      HERNIA REPAIR      HYSTERECTOMY      partial    indirect lumbar decompression      percutaneous placement of interspinous extension blocker    LEFT HEART CATHETERIZATION Left 05/20/2022    Procedure: CATHETERIZATION, HEART, LEFT;  Surgeon: Domingo Martins MD;  Location: [facility] CATH LAB;  Service: Cardiology;   Laterality: Left;    TONSILLECTOMY           Family History:  Family History   Problem Relation Age of Onset    Heart disease Mother     Hypertension Mother     Diabetes Mother     Hypertension Father     Kidney disease Father     Breast cancer Sister        Social History:  Social History     Tobacco Use    Smoking status: Never    Smokeless tobacco: Never   Substance and Sexual Activity    Alcohol use: No    Drug use: No    Sexual activity: Not Currently        Review of Systems   Review of Systems   Constitutional:  Negative for fever.   HENT:  Negative for sore throat.    Respiratory:  Negative for chest tightness and shortness of breath.    Cardiovascular:  Negative for chest pain.   Gastrointestinal:  Negative for abdominal pain, nausea and vomiting.   Genitourinary:  Negative for dysuria.   Musculoskeletal:  Positive for arthralgias (L knee and R shoulder). Negative for back pain and neck pain.   Skin:  Negative for rash.   Neurological:  Negative for syncope, weakness, numbness and headaches.        (-) Paresthesias   Hematological:  Does not bruise/bleed easily.   All other systems reviewed and are negative.         Physical Exam     Initial Vitals [02/25/24 1256]   BP Pulse Resp Temp SpO2   (!) 158/81 (!) 55 18 98.3 °F (36.8 °C) 99 %      MAP       --          Physical Exam    Nursing Notes and Vital Signs Reviewed.  Constitutional: Patient is in no apparent distress. Well-developed and well-nourished.  Head: Atraumatic. Normocephalic.  Eyes: PERRL. EOM intact. Conjunctivae are not pale. No scleral icterus.  Right-sided periorbital contusion is noted.  ENT: Mucous membranes are moist. Oropharynx is clear and symmetric.  No hemotympanum.  No septal hematoma.No malocclusion.  No posterior auricular hematoma.  Patient does have a irregular tooth.  Family notes this is not related to trauma.  That this is chronic.  Patient does have a right-sided frontal hematoma.  There is dried blood.  Neck: Supple. Full  ROM. No lymphadenopathy.  No midline C-spine tenderness.  Patient able to rotate neck 45° without pain.  Cardiovascular: Regular rate. Regular rhythm. No murmurs, rubs, or gallops. Distal pulses are 2+ and symmetric.  Pulmonary/Chest: No respiratory distress. Clear to auscultation bilaterally. No wheezing or rales.  No bruising or seatbelt sign.  No crepitance.  Abdominal: Soft and non-distended.  There is no tenderness.  No rebound, guarding, or rigidity. Good bowel sounds.  No bruising or seatbelt sign.  Genitourinary: No CVA tenderness  Musculoskeletal: Moves all extremities. No obvious deformities. No edema. No calf tenderness.  T-spine:  No midline T-spine tenderness, bony step-off, or bruising noted.  L-spine:  No midline L-spine tenderness, bony step-off, or bruising noted.  Intact median, ulnar, and radial nerves both motor and sensory.  Patient able lift arms above head.  Abrasion is noted to the right posterior shoulder.  Right shoulder:  Normal alignment.  There is a abrasion/contusion posterior right shoulder.  Intact to axillary nerve.  Intact median, ulnar, and radial nerves both motor and sensory.  Right lower extremity:  Full range of motion of the right lower extremity.  There is a abrasion mid anterior tibia.  Left lower extremity:  Intact to proprioception.  Ankle stable.  No deformity.  Right knee has a contusion.  Patient able to lift leg.  No pain with rotational deformity.  Pulses intact.  Equal.  Cap refill less than 3 seconds  Skin: Warm and dry.  Neurological:  Alert, awake, and appropriate.  Normal speech.  No acute focal neurological deficits are appreciated.  Cranial nerves 2-12 intact.  GCS 15.   Psychiatric: Normal affect. Good eye contact. Appropriate in content.        Abrasion/contusion right posterior shoulder      Right frontal contusion, superficial abrasion bridge of nose, small contusion to the bridge of the nose, right sided periorbital contusion.  Note chipped irregular  shaped tooth not related to current fall.      Left knee: superficial abrasion and contusion.  Patellar tendon intact.  Joints stable.  No crepitance.  Joints stable.  S1-L5 sensation intact, L4-L5 sensation intact, L3-L4 sensation intact, L2-L3 sensation intact.  Proprioception intact.      Superficial abrasion mid tibia left.             ED Course   ED Procedures:  Procedures    ED Vital Signs:  Vitals:    02/25/24 1256   BP: (!) 158/81   Pulse: (!) 55   Resp: 18   Temp: 98.3 °F (36.8 °C)   TempSrc: Oral   SpO2: 99%       Abnormal Lab Results:  Labs Reviewed - No data to display     All Lab Results:  None  Reviewed Prior Labs:  None        Imaging Results:  Imaging Results              X-Ray Shoulder Trauma Right (Final result)  Result time 02/25/24 14:27:28      Final result by Michle Mcneill MD (02/25/24 14:27:28)                   Impression:      Degenerative changes without evidence of acute radiographic abnormality as above.      Electronically signed by: Michel Mcneill  Date:    02/25/2024  Time:    14:27               Narrative:    EXAMINATION:  XR SHOULDER TRAUMA 3 VIEW RIGHT    CLINICAL HISTORY:  Other injury of unspecified body region, initial encounter    TECHNIQUE:  Three or four views of the right shoulder were performed.    COMPARISON:  None    FINDINGS:  There is osteopenia.  No evidence of an acute displaced fracture.  Severe glenohumeral joint space loss.  There is significant acromial humeral joint space loss, which can be seen in rotator cuff pathology.  Minor periarticular calcifications and subchondral sclerosis.  The AC joint is intact with mild arthropathy.  Partially visualized right hemithorax is within normal limits.                                       X-Ray Knee 3 View Left (Final result)  Result time 02/25/24 14:28:54      Final result by Michel Mcneill MD (02/25/24 14:28:54)                   Impression:      No acute radiographic abnormality of left knee as  above.      Electronically signed by: Michel Mcneill  Date:    02/25/2024  Time:    14:28               Narrative:    EXAMINATION:  XR KNEE 3 VIEW LEFT    CLINICAL HISTORY:  Other injury of unspecified body region, initial encounter    TECHNIQUE:  AP, lateral, and Merchant views of the left knee were performed.    COMPARISON:  Bilateral knee radiograph 01/25/2024    FINDINGS:  No acute fracture.  Joint alignment is anatomic.  There is similar joint space loss most pronounced within the medial compartment.  No evidence of a joint effusion.  Tiny quadriceps insertion enthesophyte.  No radiopaque foreign body.                                       CT Head Without Contrast (Final result)  Result time 02/25/24 14:12:27      Final result by Michel Mcneill MD (02/25/24 14:12:27)                   Impression:      No CT evidence of acute intracranial abnormality.    Right supraorbital/frontal scalp hematoma.  The osseous calvarium is intact.    Chronic changes as detailed in the body of the report.    All CT scans at this facility use dose modulation, iterative reconstruction, and/or weight base dosing when appropriate to reduce radiation dose to as low as reasonably achievable.      Electronically signed by: Michel Mcneill  Date:    02/25/2024  Time:    14:12               Narrative:    EXAMINATION:  CT HEAD WITHOUT CONTRAST    CLINICAL HISTORY:  Facial trauma, blunt;    TECHNIQUE:  Contiguous axial images were obtained from the skull base through the vertex without intravenous contrast.    COMPARISON:  CTA head and neck 02/01/2023    FINDINGS:  No intracranial hemorrhage. No mass effect or midline shift. Remote appearing right PCA distribution infarct is unchanged.  There are patchy and confluent areas of periventricular and subcortical hypodensity, which are nonspecific most suggestive of chronic microvascular ischemic change.  No abnormal area of parenchymal hypodensity to suggest acute or recent major vascular  distribution cerebral infarct.  Stable size and configuration of the ventricular system.  The visualized paranasal sinuses and mastoid air cells are clear.  Prominent right supraorbital/right frontal scalp hematoma.  The osseous calvarium is intact.                                       CT Maxillofacial Without Contrast (Final result)  Result time 02/25/24 14:16:11      Final result by Michel Mcneill MD (02/25/24 14:16:11)                   Impression:      No acute maxillofacial fracture.    Large right periorbital/right frontal scalp hematoma.    All CT scans at this facility use dose modulation, iterative reconstruction, and/or weight based dosing when appropriate to reduce radiation dose to as low as reasonable achievable.      Electronically signed by: Michel Mcneill  Date:    02/25/2024  Time:    14:16               Narrative:    EXAMINATION:  CT MAXILLOFACIAL WITHOUT CONTRAST    CLINICAL HISTORY:  Facial trauma, blunt;    TECHNIQUE:  Low dose axial images, sagittal and coronal reformations were obtained through the face.  Contrast was not administered.    COMPARISON:  Maxillofacial CT 11/09/2022    FINDINGS:  Facial bones: No acute fracture.  Significant degenerative changes of the bilateral temporomandibular joints.    Subcutaneous soft tissues: Right periorbital/supraorbital subcutaneous hematoma.    Orbits: Orbital walls are intact.  Postsurgical changes of both lenses.  Globes appear intact.  Intraorbital structures appear grossly within normal limits.    Paranasal sinuses: Predominantly clear.    Aerodigestive tract: Visualized portions appear grossly within normal limits.    Lymph nodes: No pathologic adenopathy.    Salivary glands: Within normal limits.    Vascular structures: Bilateral mild carotid bifurcation atherosclerotic disease.    Skull base: No acute abnormality.    Other findings: N/A                                       CT Cervical Spine Without Contrast (Final result)  Result time  02/25/24 14:22:12      Final result by Michel Mcneill MD (02/25/24 14:22:12)                   Impression:      Minor interval T1 vertebral body height loss as compared to the prior CT cervical spine 11/09/2022.    No CT evidence of an acute cervical spine fracture or subluxation otherwise.    Multilevel degenerative changes of the cervical spine as detailed in the body of the report.    All CT scans at this facility use dose modulation, iterative reconstruction, and/or weight based dosing when appropriate to reduce radiation dose to as low as reasonable achievable.      Electronically signed by: Michel Mcneill  Date:    02/25/2024  Time:    14:22               Narrative:    EXAMINATION:  CT CERVICAL SPINE WITHOUT CONTRAST    CLINICAL HISTORY:  Neck trauma (Age >= 65y);    TECHNIQUE:  Low dose axial images, sagittal and coronal reformations were performed though the cervical spine.  Contrast was not administered.    COMPARISON:  CT cervical spine 11/09/2022    FINDINGS:  Skull Base and Craniocervical Junction (partially imaged): Partially visualized pannus formation about the dens with minimal narrowing of the craniocervical junction.  No osseous erosions.    Spinal Alignment: Slight reversal of the normal cervical lordosis.    Vertebrae: Cervical vertebral body heights appear maintained.  Mild interval height loss of the T1 vertebral body.  No evidence of an acute displaced fracture.    Discs: Multilevel disc height loss appears most pronounced at C5-C6.  Multilevel prominent anterior osteophytes are noted.    Degenerative findings: Multilevel posterior disc osteophyte complexes contributing to at least mild spinal canal stenosis at the C4-C5, C5-C6 and C6-C7 levels.  Multilevel facet arthropathy and uncovertebral joint spurring contributing to multilevel neural foraminal stenosis    Paraspinal muscles & soft tissues: Bilateral carotid bifurcation atherosclerotic disease.    Lung apices: Unchanged 0.3 cm right  upper lobe pulmonary nodule.  Lung apices are otherwise clear.                                            The Emergency Provider reviewed the vital signs and test results, which are outlined above.     ED Discussion   ED Medication(s):  Medications   acetaminophen tablet 650 mg (650 mg Oral Given 2/25/24 1356)   bacitracin ointment 1 Tube (1 Tube Topical (Top) Given 2/25/24 1410)       ED Course as of 02/26/24 0856   Sun Feb 25, 2024   1504 Left knee no fracture.  Independent review by ED physician. [LB]   1505 Right shoulder:  No fracture.  Osteopenia.  Independent review by ED physician [LB]   1506 Mild interval height loss of the T1 vertebral body.  No evidence of an acute displaced fracture.  On clinical exam patient is not tender to palpation at T1. [LB]      ED Course User Index  [LB] Maryanne France, DO          4:01 PM Reassessment: Dr. France reassessed the pt.  The pt is resting comfortably and is NAD.  Pt states their sx have improved. Discussed test results, shared treatment plan, specific conditions for return, and the need for f/u.  Answered their questions at this time.  Pt understands and agrees to the plan.  The pt has remained hemodynamically stable through ED course and is stable for discharge.    I discussed with patient and/or family/caretaker that evaluation in the ED does not suggest any emergent or life threatening medical conditions requiring immediate intervention beyond what was provided in the ED, and I believe patient is safe for discharge.  Regardless, an unremarkable evaluation in the ED does not preclude the development or presence of a serious of life threatening condition. As such, patient was instructed to return immediately for any worsening or change in current symptoms.    I discussed with patient and/or family/caretaker that negative X-ray does not rule out occult fracture or other soft tissue injury.  Persistent pain greater than 7-10 days or increased pain requires  follow up, specifically with orthopedics.     Trauma precautions were discussed with patient and/or family/caretaker; I do not specifically detect any abdominal, thoracic, CNS, orthopedic, or other emergent or life threatening condition and that patient is safe to be discharged.  It was also discussed that despite an unrevealing examination and negative radiographic examination for serious or life threatening injury, these conditions may still exist.  As such, patient should return to ED immediately should they experience, severe or worsening pain, shortness of breath, abdominal pain, headache, vomiting, or any other concern.  It was also discussed that not infrequently, injuries may not be diagnosed during the initial ED visit (such as fractures) and that if the patient discovers a new area of concern, a new area of injury that was not evaluated in the ED, they should return for evaluation as they may have an injury that requires treatment.    The patient has family members that will observe him/her over the next 24 hours and that are competent to bring him/her back to the Emergency Department if concerning signs or symptoms develop. The family members are comfortable with this responsibility.  I have given detailed written and verbal instructions to the family to bring the patient back to the ED should any concerning signs such as excessive sleepiness, lethargy, confusion, unequal pupils, recurrent vomiting, seizure activity, loss of consciousness, or focal weakness develop.       MIPS Measures     Smoker? No     Hypertension: History of Hypertension: The patient has elevated blood pressure (higher than 120/80) while being treated in the ED but has a history of hypertension.    MIPS Measure #415:  Emergency Department Utilization of CT for Minor Blunt Head Trauma for Patients age 18 Years  and  Older.    Measure exclusions:  The patient has a ventricular shunt?   No  The patient has a brain tumor? No  The patient  has multi-system trauma? No  The patient is pregnant? N/A  The patient is taking anti platelet medication excluding aspirin?  N/A  Age 65 years and older?  Yes  Patient is 83 y.o.    A head CT was ordered? Yes:   The CT was ordered by the emergency provider?  Yes      Medical Decision Making                 Medical Decision Making  Differential diagnosis includes fractures, abrasions, contusions, sprains, intracranial hemorrhage    Imaging negative for fractures.  Patient given Tylenol.  Ice pack Wounds cleaned/ topical antibiotics..  Patient underwent serial neurologic exams.  No change in mental status.  Patient able to ambulate.  Head injury instructions given.  Discussed negative x-ray precautions.  Discussed indications to return emergency department.        Amount and/or Complexity of Data Reviewed  Independent Historian: caregiver and EMS     Details: See HPI.  Radiology: ordered and independent interpretation performed. Decision-making details documented in ED Course.     Details: See ED course.    Risk  OTC drugs.        Coding    Prescription Management: I performed a review of the patient's current Rx medication list as input by nursing staff.    Discharge Medication List as of 2/25/2024  4:07 PM        CONTINUE these medications which have NOT CHANGED    Details   aspirin (ECOTRIN) 81 MG EC tablet Take 1 tablet (81 mg total) by mouth once daily., Starting Fri 2/3/2023, Until Thu 2/15/2024, Normal      atorvastatin (LIPITOR) 10 MG tablet Take 1 tablet (10 mg total) by mouth every evening., Starting Thu 2/15/2024, Until Fri 2/14/2025, Normal      DULoxetine (CYMBALTA) 30 MG capsule Take 30 mg by mouth once daily., Historical Med      furosemide (LASIX) 20 MG tablet TAKE 1 TABLET BY MOUTH ONCE A DAY, Starting Tue 8/1/2023, Normal      losartan (COZAAR) 50 MG tablet Take 1 tablet (50 mg total) by mouth 2 (two) times a day., Starting Thu 9/7/2023, Until Fri 9/6/2024, Normal      metoprolol succinate  "(TOPROL-XL) 25 MG 24 hr tablet Take 1 tablet (25 mg total) by mouth once daily., Starting Fri 9/22/2023, Until Sat 9/21/2024, Normal      mupirocin (BACTROBAN) 2 % ointment Apply topically 2 (two) times daily., Starting Wed 1/10/2024, Normal      NUCYNTA 50 mg Tab Take 1 tablet (50 mg total) by mouth every 8 (eight) hours as needed (severe pain). RESTART WHEN OK PER PAIN MANAGEMENT PROVIDER, Starting Mon 12/19/2022, No Print      tiZANidine (ZANAFLEX) 4 MG tablet Starting Wed 2/15/2023, Historical Med              Discussed case with:N/A      Portions of this note may have been created with voice recognition software. Occasional "wrong-word" or "sound-a-like" substitutions may have occurred due to the inherent limitations of voice recognition software. Please, read the note carefully and recognize, using context, where substitutions have occurred.          Clinical Impression       ICD-10-CM ICD-9-CM   1. Periorbital hematoma of right eye  H05.231 376.32   2. Contusion face  T14.8XXA 924.9   3. Left knee pain  M25.562 719.46   4. Abrasion, right shoulder  T14.8XXA 919.0   5. Contusion of nose, initial encounter  S00.33XA 920   6. Contusion of left knee, initial encounter  S80.02XA 924.11   7. Abrasion of right lower extremity, initial encounter  S80.811A 916.0         ED Disposition     Disposition: Discharge to home  Patient condition: Stable    ED Follow-up   Follow-up Information       Mckayla Eisenberg MD In 2 days.    Specialty: Family Medicine  Why: Return to emergency department for:  Confusion, nausea, vomiting, severe headache, new areas of injury, lethargy, fever, redness, other concerns  Contact information:  [address] [place] [address] DR Iggy HARRIS [id number] [phone]                               Scribe Attestation:   Scribe #1: I performed the above scribed service and the documentation accurately describes the services I performed. I attest to the accuracy of the note.     Attending:   Physician " Attestation Statement for Scribe #1: I, Maryanne France, , personally performed the services described in this documentation, as scribed by Ying Wakefield, in my presence, and it is both accurate and complete.                 Maryanne France,   02/26/24 0854       Maryanne France,   02/26/24 0856

## 2024-02-25 NOTE — DISCHARGE INSTRUCTIONS
Wash abrasions with soap and water.  Use over-the-counter triple antibiotic ointment.  Wash area twice a day  I discussed with patient and/or family/caretaker that negative X-ray does not rule out occult fracture or other soft tissue injury.  Persistent pain greater than 7-10 days or increased pain requires follow up, specifically with orthopedics.

## 2024-02-26 ENCOUNTER — PATIENT OUTREACH (OUTPATIENT)
Dept: EMERGENCY MEDICINE | Facility: HOSPITAL | Age: 84
End: 2024-02-26
Payer: MEDICARE

## 2024-02-27 NOTE — PROGRESS NOTES
Pt visited the ED on 2/25/24. I made 2 attempts to reach patient to assist with scheduling a post ED 7-day follow up with PCP. Unable to reach. No further attempts scheduled.  ED Navigator to close encounter at this time.    Urvashi Valle   Consent (Spinal Accessory)/Introductory Paragraph: The rationale for Mohs was explained to the patient and consent was obtained. The risks, benefits and alternatives to therapy were discussed in detail. Specifically, the risks of damage to the spinal accessory nerve, infection, scarring, bleeding, prolonged wound healing, incomplete removal, allergy to anesthesia, and recurrence were addressed. Prior to the procedure, the treatment site was clearly identified and confirmed by the patient. All components of Universal Protocol/PAUSE Rule completed.

## 2024-02-29 ENCOUNTER — EXTERNAL CHRONIC CARE MANAGEMENT (OUTPATIENT)
Dept: PRIMARY CARE CLINIC | Facility: CLINIC | Age: 84
End: 2024-02-29
Payer: MEDICARE

## 2024-02-29 ENCOUNTER — CLINICAL SUPPORT (OUTPATIENT)
Dept: REHABILITATION | Facility: HOSPITAL | Age: 84
End: 2024-02-29
Payer: MEDICARE

## 2024-02-29 DIAGNOSIS — M46.1 SACROILIITIS, NOT ELSEWHERE CLASSIFIED: Primary | ICD-10-CM

## 2024-02-29 DIAGNOSIS — Z74.09 DECREASED STRENGTH, ENDURANCE, AND MOBILITY: ICD-10-CM

## 2024-02-29 DIAGNOSIS — R68.89 DECREASED STRENGTH, ENDURANCE, AND MOBILITY: ICD-10-CM

## 2024-02-29 DIAGNOSIS — R26.9 GAIT ABNORMALITY: ICD-10-CM

## 2024-02-29 DIAGNOSIS — R53.1 DECREASED STRENGTH, ENDURANCE, AND MOBILITY: ICD-10-CM

## 2024-02-29 PROCEDURE — 99490 CHRNC CARE MGMT STAFF 1ST 20: CPT | Mod: S$PBB,,, | Performed by: FAMILY MEDICINE

## 2024-02-29 PROCEDURE — 97112 NEUROMUSCULAR REEDUCATION: CPT

## 2024-02-29 PROCEDURE — 97110 THERAPEUTIC EXERCISES: CPT

## 2024-02-29 PROCEDURE — 99490 CHRNC CARE MGMT STAFF 1ST 20: CPT | Mod: PBBFAC | Performed by: FAMILY MEDICINE

## 2024-02-29 NOTE — PROGRESS NOTES
[facility]   Physical Therapy Treatment Note        Name: Leslie CASTELLON [facility] Number: [id number]    Therapy Diagnosis:   Encounter Diagnoses   Name Primary?    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  As a result, she has a large hematoma on her face.  She does not report of increased pain at the lower back region.    He/She was partially compliant with home exercise program.  Response to previous treatment: some pain relief  Functional change: Some increased speed with walking    Pain: 7/10     Location: lumbar paraspinal region    Objective      Objective Measures updated at progress report or POC update only unless otherwise noted.     Lumbar Spine ROM: %, BB 75%, R %, L SB 75% left sided pain, R Rot 50%, L Rot 66% pain left lumbar region    Palpation: Trigger points on palpation of bilateral lumbosacral paraspinal/multifidus    Treatment     Leslie received the treatments listed below:     THERAPEUTIC EXERCISES to develop strength, endurance, ROM, flexibility, posture, and core stabilization for (30) minutes  "including:    Intervention Performed Today    Nustep  x  15 minutes, L3   Lower trunk rotations x 10 second holds x 10   Pelvic tilts x Anterior/posterior with cueing- 10 second holds x 2 min   Single knee to chest  10 second holds x 10   Sitting flexion with swiss ball  10x   Isometric adduction with ball   3  min   Abdominal brace x With bilateral knee to chest and slowly lowering - 2x10   Supine x Active straight leg raise - 2x10       Leslie participated in neuromuscular re-education activities to improve: Balance, Coordination, Proprioception, and Posture for 30 minutes. The following activities were included:      Intervention 12/12/2023  Parameters   Sit to stand x 10x/ 2 sets hands together   Prone hip extension x 10x with cues   Sidelying hip series x 2x10 abduction  10x/ 2 sets clamshell   Bridging x 2x10                       Plan for Next Visit: Progress as indicated        MANUAL THERAPY TECHNIQUES were applied for (0) minutes, including:    Manual Intervention Performed Today    Soft Tissue Mobilization   STM bilateral multifidus and gluteal/ piriformis,   Joint Mobilizations  PA glides lumbosacral   Mobilization with movement     Muscle energy techniques   "Shotgun" for + SI provocation   Functional Dry Needling   FDN performed to the left lumbosacral paraspinal/multifidus     Plan for Next Visit:         Patient Education and Home Exercises       Home Exercises Provided and Patient Education Provided     Education provided: (5) minutes  Moist heat pack to low back prior to exercises at home.    Written Home Exercises Provided: yes.  Exercises were reviewed and Leslie was able to demonstrate them prior to the end of the session.  Leslie demonstrated fair  understanding of the education provided. See EMR under Patient Instructions for exercises provided during therapy sessions.    Assessment     Patient suffered a fall while getting out of a car.  There is a significant hematoma but no complaints of pain.  " She does demonstrate good performance of exercises.  Will continue to progress into more balance activities with continued treatment.    Leslie is progressing well towards her goals.   Patient prognosis is Fair.     Patient will continue to benefit from skilled outpatient physical therapy to address the deficits listed in the problem list box on initial evaluation, provide pt/family education and to maximize patient's level of independence in the home and community environment.     Patient's spiritual, cultural and educational needs considered and pt agreeable to plan of care and goals.     Anticipated Barriers for therapy: Anxiety or Panic Disorders, Arthritis, Back pain, Depression, Headaches, High  Blood Pressure, Kidney, Bladder, Prostate or Urination Problems, Osteoporosis, Prior Surgery, Stroke or TIA, Visual Impairment      Goals: Reviewed:2/29/2024       Short Term Goals: In 4 weeks   1.Patient to be educated on HEP. - met  2. Patient  to increase lumbar spine ROM to B SB 30 deg, B Rot 75% - PC  3. Patient  to increase hip PROM to 60 deg ER bilaterally, IR 20 deg bilaterally, in order to assist with more normalized gait pattern. - met  4. Patient  to increase B LE and core strength to 4-/5 in order to improve gait and balance.  - PC  5. Patient to increased right knee extn ROM to -5 deg for improved gait mechanics- PC  6. Patient   to have pain less than 2/10 at worst, to improve QOL. - PC  7. Patient  to improve score on the FOTO, to improve QOL.- PC  8. Patient  to be educated on postural/body mechanics awareness. - PC     Long Term Goals: In 8 weeks  1.Patient to improve score on the FOTO to 48 or better, to improve QOL. - PC  2. Patient to demo increase in LE strength to 4/5, in order to improve endurance and increase ability to ambulate for increased time. - PC  3. Patient to have decreased pain to 2/10 at worst, to improve QOL. - met  4. Patient to demo increase lumbar ROM to ,  SB 30 deg, B Rot 90%in  order to improve available range of motion for ADL's. - PC  .  5. Patient  to increase hip PROM to Extn +5 deg,  60 deg ER bilaterally, IR 20 deg bilaterally, in order to assist with more normalized gait pattern. - PC  6. Patient to increased right knee extn ROM to -5 deg for improved gait mechanics - PC  6. Patient to perform daily activities without increased symptoms including:  Prolonged walking x 10 minutes. - met  Ascending.descending 6 inch step without upper extremities assistance - PC  Return to gym and work outings 2 times per week with family transportation - met         Plan     Would like to extend treatment for 2 more months to allow the patient to return to an exercise regimen as she has had limited exercises over the last few weeks with the recent death of the patient's daughter.      Leoncio Lujan, PT

## 2024-03-07 ENCOUNTER — CLINICAL SUPPORT (OUTPATIENT)
Dept: REHABILITATION | Facility: HOSPITAL | Age: 84
End: 2024-03-07
Payer: MEDICARE

## 2024-03-07 DIAGNOSIS — R68.89 DECREASED STRENGTH, ENDURANCE, AND MOBILITY: ICD-10-CM

## 2024-03-07 DIAGNOSIS — R53.1 DECREASED STRENGTH, ENDURANCE, AND MOBILITY: ICD-10-CM

## 2024-03-07 DIAGNOSIS — M46.1 SACROILIITIS, NOT ELSEWHERE CLASSIFIED: Primary | ICD-10-CM

## 2024-03-07 DIAGNOSIS — Z74.09 DECREASED STRENGTH, ENDURANCE, AND MOBILITY: ICD-10-CM

## 2024-03-07 DIAGNOSIS — R26.9 GAIT ABNORMALITY: ICD-10-CM

## 2024-03-07 DIAGNOSIS — R41.841 COGNITIVE COMMUNICATION DISORDER: Primary | ICD-10-CM

## 2024-03-07 PROCEDURE — 97112 NEUROMUSCULAR REEDUCATION: CPT

## 2024-03-07 PROCEDURE — 97110 THERAPEUTIC EXERCISES: CPT

## 2024-03-07 PROCEDURE — 97129 THER IVNTJ 1ST 15 MIN: CPT

## 2024-03-07 PROCEDURE — 97130 THER IVNTJ EA ADDL 15 MIN: CPT

## 2024-03-07 NOTE — PROGRESS NOTES
"[facility]  Speech Therapy Treatment Note- Neurological Rehabilitation  Date: 3/7/2024     Name: Leslie Wood   MRN: [id number]   Therapy Diagnosis:   Encounter Diagnosis   Name Primary?    Cognitive communication disorder Yes   Physician: Marily Millan NP  Physician Orders: Ambulatory Referral to Speech Therapy   Medical Diagnosis: Disorientation [R41.0], Aphasia [R47.01]     Visit #/ Visits Authorized: 4/ 12 (+eval, +5)  Date of Evaluation:  11/8/23  Insurance Authorization Period: 11/15/2023 - 12/31/2023   Plan of Care Expiration Date:    1/31/24  Extended Plan of Care:  2/21/24-5/1/24  Progress Note: 3/21/24     Time In:  9:02 AM  Time Out:  9:58 AM  Total Billable Time: 56 mins      Precautions: Standard, Fall, Cognition, and Communication  Subjective:   Patient reports: she has been doing okay. She feels better and only reports pain her lower back. She reported continued difficulty with verbal expression as she often "forgets" the word she wants to use or cannot seem to verbalize a word she wants to use.   She was compliant to home exercise program.     Response to previous treatment: good    Pain Scale: 7/10 on a Visual Analog Scale currently.  Pain Location: lower back  Objective:   TIMED  Procedure Min.   Cognitive Therapeutic Interventions, first 15 minutes CPT 80710  15   Cognitive Therapeutic Interventions, each additional 15 minutes CPT 50475  41           Short Term Goals: (6 weeks) Current Progress:   Patient will sustain attention to a simple task (auditory or visual) for 3 minutes with 90% accuracy or no more than 2 redirections.      Goal Met 12/13/2023   Auditory: met x2  Visual: met x7 Auditory Sustained Attention:  Several sustained auditory attention tasks completed in today's session. Patient listened to multi-step commands and followed with 91% accuracy. She required initial verbal cueing for use of strategies including taking requesting repetition as needed. Improved use " of strategies independently with overall improved accuracy in today's session.    Visual Sustained Attention:  Patient completed several visual sustained attention tasks in today's session in which she visually scanned for letters/numbers in a paragraph.   Trial 1: 1:30, 100% accuracy, 1/7 relative scale of cognitive load   Trial 2: 1:45, 100% accuracy, 1/7 relative scale of cognitive load   Trial 3: 1:30, 100% accuracy, 1/7 relative scale of cognitive load    Trial 4: 2:08, -7 (missed entire row of scanning)   Trial 5: 2:10, 100% accuracy, 1/7 relative scale of cognitive load   Trial 6: 1:30, 100% accuracy, 1/7 relative scale of cognitive load     Overall improved use of strategies to facilitate visual scanning pattern as well as good awareness of errors. Overall high accuracy of completion with low cognitive load.       2. Patient will complete selective attention tasks (auditory or visual) with 85% accuracy independently increase selective attention.      Progressing/ Not Met 3/7/2024   Auditory: met x5  Visual: met x1 Auditory Selective Attention:  Patient listened for and followed complex 2 step commands in a dynamic environment with auditory and visual distractions during completion. She followed commands with 92% accuracy and initial verbal cueing for use of strategies. Discussion of how this relates to conversation and asking clarifying questions to facilitate attention and information processing. She rated task as 2/7 on relative scale of cognitive load.    She then listened to a 5 minute video about how basketballs are made. She requested to take notes as needed and paused/rewound video as needed. She answered questions about what she heard with 92% accuracy and rated task as 2/7 on relative scale of cognitive load.     Visual Selective Attention:  Patient participated in visual selective attention tasks in today's session in which she visually scanned for target letters in paragraphs with natural  "background noise (therapy door remained opened for the entirety of today's session.) Clinician and patient discussed strategies that worked well last session such as stating the target letter aloud and reducing rate. New strategy of using a blank paper to "underline" each line she was working on was used. She completed this task with 76% accuracy. Clinician and patient again discussed strategies of slowing down and re-checking work. She completed a second trial (different paragraph/target letter) with 83% accuracy and two instance of self correction.     Patient participated in visual selective attention task of reviewing a phone bill for errors. She was given 12 phone calls and 5 expected area codes. She was asked to review the calls and find the unexplained calls. She reviewed 12 phone calls although had difficulty recalling the instructions. She was able to complete this task with moderate-maximum cueing from the clinician. Alternating portion of this task (reviewing phone bill and referencing separate list of expected area codes) may have impacted accuracy.    3. Patient will use attention shifting strategies to shift attention between two tasks (auditory or visual) with no more than 3 cues or 90% accuracy to improve alternating attention.      Progressing/ Not Met 3/7/2024  Not formally addressed    4. Patient will recall functional information (name, birthday, address, etc) following delays of 2-3 minutes following a prompt questions across 3 therapy sessions with 80% accuracy (spaced retrieval).      Progressing/ Not Met 3/7/2024   Not formally addressed     5. Patient will complete a functional task to improve attention, memory and/or executive functions (I.e. sample bill paying activity, recipe, or multiple choice comprehension questions to 1 paragraphs) with 80% accuracy and natural environment noise distractions (TV news background, music, etc.).      Progressing/ Not Met 3/7/2024    Not formally " addressed     6. Patient will be educated regarding cognitive deficits as applicable to the patient's life for increased awareness and will execute returned demonstration of information with 80% accuracy.       Goal Met 2024  Patient and her granddaughter again educated regarding nature of cognitive deficits including a variety of foundational cognitive elements related to R hemisphere dysfunction following CVA including the cognitive triangle (with emphasis on foundational versus higher level cognitive functions, awareness of deficits, impact of attention, information processing, memory, and executive functioning deficits as each area relates to assessment findings), spoon theory to discuss cognitive versus physical versus emotional fatigue and management of each to more efficiently use energy daily for more or less cognitively demanding tasks, internal versus external distractions (including emotions, pain, stressors, etc) and management of each, as well as overall impact of these deficits on daily functioning. Patient demonstrated awareness of information provided as demonstrated by asking good questions and expressing understanding. Discussed specifically use of mindfulness and meditation/breathing strategies to improve cognitive functioning related to internal, specifically emotional stressors.       7. Patient will participate in continued cognitive assessment of right hemisphere dysfunction.     Goal Met 2023   See results of RICE below.      Patient Education/Response:   Patient educated regarding the followin. Strategies for improved accuracy such a slowing down and re-checking work   2. Impact of specific right hemisphere dysfunction on reading, inattention/left neglect, ability to complete daily tasks     Home program established: Patient instructed to continue prior program  Patient verbalized understanding to all above education provided.     See Electronic Medical Record under Patient  Instructions for exercises provided throughout therapy.  Assessment:   In today's session, improvements noted in visual scanning/sustained attention task given consistent reminder to use strategies. Patient is able to verbalize strategies independently but requires consistent cueing/reminders to utilize strategies.     Leslie is progressing well towards her goals. Current goals remain appropriate. Goals to be updated as necessary.     Patient prognosis is Fair. Patient will continue to benefit from skilled outpatient speech and language therapy to address the deficits listed in the problem list on initial evaluation, provide patient/family education and to maximize patient's level of independence in the home and community environment.     Medical necessity is demonstrated by the following IMPAIRMENTS:  Cognition: Deficits in executive functioning, attention, and memory prevent the pt from relaying medically and safety relevant information in a timely manner in a state of emergency.     Barriers to Therapy: NA  Patient's spiritual, cultural and educational needs considered and patient agreeable to plan of care and goals.  Plan:   Continue Plan of Care with focus on rehabilitation and compensation for attention, memory, and executive functioning deficits impacting safety and efficiency of daily task completion.     Becca Blanco, CCC-SLP, CBIS   Speech Language Pathologist   Certified Brain Injury Specialist   3/7/2024

## 2024-03-07 NOTE — PROGRESS NOTES
[facility]   Physical Therapy Treatment Note        Name: Leslie CASTELLON [facility] Number: [id number]    Therapy Diagnosis:   Encounter Diagnoses   Name Primary?    Sacroiliitis, not elsewhere classified Yes    Decreased strength, endurance, and mobility     Gait abnormality        Physician: Marily Millan NP    Visit Date: 3/7/2024    Physician Orders: PT Eval and Treat   Medical Diagnosis from Referral:   Late effect of cerebrovascular accident (CVA)   Hemiplegia and hemiparesis following cerebral infarction affecting left non-dominant side   Gait difficulty   Evaluation Date: 11/16/2023  Authorization Period Expiration: 10/16/2024  Plan of Care Expiration: 4/19/2024  Progress Note Due: 3/19/2024  Visit # / Visits authorized: 3/12   FOTO: 1/3      Precautions: Standard and Fall (1/8/24 - patient reports history of 3 back fusion surgeries)  PTA Visit #: 0/5     Time In: 1000  Time Out: 1100  Total Billable Time: 60 minutes   (Billing reflects 1 on 1 treatment time spent with patient)    Patient reports:that she experiences mild lower back pain today    He/She was partially compliant with home exercise program.  Response to previous treatment: some pain relief  Functional change: Some increased speed with walking    Pain: 3/10     Location: lumbar paraspinal region    Objective      Objective Measures updated at progress report or POC update only unless otherwise noted.     Lumbar Spine ROM: %, BB 75%, R %, L SB 75% left sided pain, R Rot 50%, L Rot 66% pain left lumbar region    Palpation: Trigger points on palpation of bilateral lumbosacral paraspinal/multifidus    Treatment     Leslie received the treatments listed below:     THERAPEUTIC EXERCISES to develop strength, endurance, ROM, flexibility, posture, and core stabilization for (30) minutes including:    Intervention Performed Today    Nustep  x  15 minutes, L3   Lower trunk rotations x 10 second holds x 10   Pelvic tilts  "x Anterior/posterior with cueing- 10 second holds x 2 min   Single knee to chest  10 second holds x 10   Sitting flexion with swiss ball  10x   Isometric adduction with ball   3  min   Abdominal brace x With bilateral knee to chest and slowly lowering - 2x10   Supine x Active straight leg raise - 2x10       Leslie participated in neuromuscular re-education activities to improve: Balance, Coordination, Proprioception, and Posture for 30 minutes. The following activities were included:      Intervention 12/12/2023  Parameters   Sit to stand off mat and used 4 inch box x 10x/ 2 sets hands together   Prone hip extension x 10x with cues   Sidelying hip series x 2x10 abduction  10x/ 2 sets clamshell   Bridging x 2x10   Step Ups x 4 inch box                  Plan for Next Visit: Progress as indicated        MANUAL THERAPY TECHNIQUES were applied for (0) minutes, including:    Manual Intervention Performed Today    Soft Tissue Mobilization   STM bilateral multifidus and gluteal/ piriformis,   Joint Mobilizations  PA glides lumbosacral   Mobilization with movement     Muscle energy techniques   "Shotgun" for + SI provocation   Functional Dry Needling   FDN performed to the left lumbosacral paraspinal/multifidus     Plan for Next Visit:         Patient Education and Home Exercises       Home Exercises Provided and Patient Education Provided     Education provided: (5) minutes  Moist heat pack to low back prior to exercises at home.    Written Home Exercises Provided: yes.  Exercises were reviewed and Leslie was able to demonstrate them prior to the end of the session.  Leslie demonstrated fair  understanding of the education provided. See EMR under Patient Instructions for exercises provided during therapy sessions.    Assessment     Patient initially experienced a bit of lower back pain.  However, she did report that pain was decreased with performance of exercises to mild.  Good performance of all exercises.  Continuing to " progress further into strengthening with continued treatment.    Leslie is progressing well towards her goals.   Patient prognosis is Fair.     Patient will continue to benefit from skilled outpatient physical therapy to address the deficits listed in the problem list box on initial evaluation, provide pt/family education and to maximize patient's level of independence in the home and community environment.     Patient's spiritual, cultural and educational needs considered and pt agreeable to plan of care and goals.     Anticipated Barriers for therapy: Anxiety or Panic Disorders, Arthritis, Back pain, Depression, Headaches, High  Blood Pressure, Kidney, Bladder, Prostate or Urination Problems, Osteoporosis, Prior Surgery, Stroke or TIA, Visual Impairment      Goals: Reviewed:3/7/2024       Short Term Goals: In 4 weeks   1.Patient to be educated on HEP. - met  2. Patient  to increase lumbar spine ROM to B SB 30 deg, B Rot 75% - PC  3. Patient  to increase hip PROM to 60 deg ER bilaterally, IR 20 deg bilaterally, in order to assist with more normalized gait pattern. - met  4. Patient  to increase B LE and core strength to 4-/5 in order to improve gait and balance.  - PC  5. Patient to increased right knee extn ROM to -5 deg for improved gait mechanics- PC  6. Patient   to have pain less than 2/10 at worst, to improve QOL. - PC  7. Patient  to improve score on the FOTO, to improve QOL.- PC  8. Patient  to be educated on postural/body mechanics awareness. - PC     Long Term Goals: In 8 weeks  1.Patient to improve score on the FOTO to 48 or better, to improve QOL. - PC  2. Patient to demo increase in LE strength to 4/5, in order to improve endurance and increase ability to ambulate for increased time. - PC  3. Patient to have decreased pain to 2/10 at worst, to improve QOL. - met  4. Patient to demo increase lumbar ROM to ,  SB 30 deg, B Rot 90%in order to improve available range of motion for ADL's. - PC  .  5. Patient   to increase hip PROM to Extn +5 deg,  60 deg ER bilaterally, IR 20 deg bilaterally, in order to assist with more normalized gait pattern. - PC  6. Patient to increased right knee extn ROM to -5 deg for improved gait mechanics - PC  6. Patient to perform daily activities without increased symptoms including:  Prolonged walking x 10 minutes. - met  Ascending.descending 6 inch step without upper extremities assistance - PC  Return to gym and work outings 2 times per week with family transportation - met         Plan     Would like to extend treatment for 2 more months to allow the patient to return to an exercise regimen as she has had limited exercises over the last few weeks with the recent death of the patient's daughter.      Leoncio Lujan, PT

## 2024-03-11 ENCOUNTER — CLINICAL SUPPORT (OUTPATIENT)
Dept: REHABILITATION | Facility: HOSPITAL | Age: 84
End: 2024-03-11
Payer: MEDICARE

## 2024-03-11 DIAGNOSIS — R68.89 DECREASED STRENGTH, ENDURANCE, AND MOBILITY: ICD-10-CM

## 2024-03-11 DIAGNOSIS — R41.841 COGNITIVE COMMUNICATION DISORDER: Primary | ICD-10-CM

## 2024-03-11 DIAGNOSIS — I69.30 LATE EFFECT OF CEREBROVASCULAR ACCIDENT (CVA): Primary | ICD-10-CM

## 2024-03-11 DIAGNOSIS — Z78.9 DECREASED ACTIVITIES OF DAILY LIVING (ADL): ICD-10-CM

## 2024-03-11 DIAGNOSIS — R29.818 FINE MOTOR IMPAIRMENT: ICD-10-CM

## 2024-03-11 DIAGNOSIS — R53.1 DECREASED STRENGTH, ENDURANCE, AND MOBILITY: ICD-10-CM

## 2024-03-11 DIAGNOSIS — R29.898 DECREASED GRIP STRENGTH OF LEFT HAND: ICD-10-CM

## 2024-03-11 DIAGNOSIS — R29.898 FINE MOTOR IMPAIRMENT: ICD-10-CM

## 2024-03-11 DIAGNOSIS — Z74.09 DECREASED STRENGTH, ENDURANCE, AND MOBILITY: ICD-10-CM

## 2024-03-11 DIAGNOSIS — I69.354 HEMIPLEGIA AND HEMIPARESIS FOLLOWING CEREBRAL INFARCTION AFFECTING LEFT NON-DOMINANT SIDE: ICD-10-CM

## 2024-03-11 PROCEDURE — 97110 THERAPEUTIC EXERCISES: CPT

## 2024-03-11 PROCEDURE — 97130 THER IVNTJ EA ADDL 15 MIN: CPT

## 2024-03-11 PROCEDURE — 97129 THER IVNTJ 1ST 15 MIN: CPT

## 2024-03-11 PROCEDURE — 97530 THERAPEUTIC ACTIVITIES: CPT

## 2024-03-11 PROCEDURE — 97112 NEUROMUSCULAR REEDUCATION: CPT

## 2024-03-11 NOTE — PROGRESS NOTES
[facility]  Speech Therapy Treatment Note- Neurological Rehabilitation  Date: 3/11/2024     Name: Leslie Wood   MRN: [id number]   Therapy Diagnosis:   Encounter Diagnosis   Name Primary?    Cognitive communication disorder Yes   Physician: Marily Millan NP  Physician Orders: Ambulatory Referral to Speech Therapy   Medical Diagnosis: Disorientation [R41.0], Aphasia [R47.01]     Visit #/ Visits Authorized: 5/ 12 (+eval, +5)  Date of Evaluation:  11/8/23  Insurance Authorization Period: 11/15/2023 - 12/31/2023   Plan of Care Expiration Date:    1/31/24  Extended Plan of Care:  2/21/24-5/1/24  Progress Note: 3/21/24     Time In:  10:18 AM  Time Out:  11:14 AM  Total Billable Time: 56 mins      Precautions: Standard, Fall, Cognition, and Communication  Subjective:   Patient reports: she has been doing okay. She has had difficulty sleeping.   She was compliant to home exercise program.     Response to previous treatment: good    Pain Scale: 7/10 on a Visual Analog Scale currently.  Pain Location: lower back  Objective:   TIMED  Procedure Min.   Cognitive Therapeutic Interventions, first 15 minutes CPT 84538  15   Cognitive Therapeutic Interventions, each additional 15 minutes CPT 03128  41           Short Term Goals: (6 weeks) Current Progress:   Patient will sustain attention to a simple task (auditory or visual) for 3 minutes with 90% accuracy or no more than 2 redirections.      Goal Met 12/13/2023   Auditory: met x2  Visual: met x7 Auditory Sustained Attention:  Several sustained auditory attention tasks completed in today's session. Patient listened to multi-step commands and followed with 91% accuracy. She required initial verbal cueing for use of strategies including taking requesting repetition as needed. Improved use of strategies independently with overall improved accuracy in today's session.    Visual Sustained Attention:  Patient completed several visual sustained attention tasks in  today's session in which she visually scanned for letters/numbers in a paragraph.   Trial 1: 1:30, 100% accuracy, 1/7 relative scale of cognitive load   Trial 2: 1:45, 100% accuracy, 1/7 relative scale of cognitive load   Trial 3: 1:30, 100% accuracy, 1/7 relative scale of cognitive load    Trial 4: 2:08, -7 (missed entire row of scanning)   Trial 5: 2:10, 100% accuracy, 1/7 relative scale of cognitive load   Trial 6: 1:30, 100% accuracy, 1/7 relative scale of cognitive load     Overall improved use of strategies to facilitate visual scanning pattern as well as good awareness of errors. Overall high accuracy of completion with low cognitive load.       2. Patient will complete selective attention tasks (auditory or visual) with 85% accuracy independently increase selective attention.      Progressing/ Not Met 3/11/2024   Auditory: met x5  Visual: met x1 Auditory Selective Attention:  Patient listened for and followed complex 2 step commands in a dynamic environment with auditory and visual distractions during completion. She followed commands with 92% accuracy and initial verbal cueing for use of strategies. Discussion of how this relates to conversation and asking clarifying questions to facilitate attention and information processing. She rated task as 2/7 on relative scale of cognitive load.    She then listened to a 5 minute video about how basketballs are made. She requested to take notes as needed and paused/rewound video as needed. She answered questions about what she heard with 92% accuracy and rated task as 2/7 on relative scale of cognitive load.     Visual Selective Attention:  Patient participated in visual selective attention tasks of locating letters in alphabetical order in a jumbled paragraph. She completed the task with 80% accuracy independently initially. Clinician and patient then discussed strategies for improved accuracy. Patient utilized a blank paper to keep track of which line she was  looking at. She then completed this task with 100% accuracy given minimal cueing.    3. Patient will use attention shifting strategies to shift attention between two tasks (auditory or visual) with no more than 3 cues or 90% accuracy to improve alternating attention.      Progressing/ Not Met 3/11/2024  Auditory Alternating Attention:   Patient completed auditory alternating attention task of listing items in two categories in an alternating fashion (I.e. one state, one color, one state, one color).  Trial 1: States/colors: Patient required consistent cueing to recall categories and to recall which responses she had already given. However, accuracy increased throughout the trial and the patient was able to generate the last 3 responses independently.     Trial 2: Animals/drinks: Patient able to recall categories with one instance of proactive interference (patient attempted to return to colors). However, following minimal cueing she was able to return to task and complete this trial with 80% accuracy.     4. Patient will recall functional information (name, birthday, address, etc) following delays of 2-3 minutes following a prompt questions across 3 therapy sessions with 80% accuracy (spaced retrieval).      Progressing/ Not Met 3/11/2024   Not formally addressed     5. Patient will complete a functional task to improve attention, memory and/or executive functions (I.e. sample bill paying activity, recipe, or multiple choice comprehension questions to 1 paragraphs) with 80% accuracy and natural environment noise distractions (TV news background, music, etc.).      Progressing/ Not Met 3/11/2024    Not formally addressed     6. Patient will be educated regarding cognitive deficits as applicable to the patient's life for increased awareness and will execute returned demonstration of information with 80% accuracy.       Goal Met 1/24/2024  Patient and her granddaughter again educated regarding nature of cognitive  deficits including a variety of foundational cognitive elements related to R hemisphere dysfunction following CVA including the cognitive triangle (with emphasis on foundational versus higher level cognitive functions, awareness of deficits, impact of attention, information processing, memory, and executive functioning deficits as each area relates to assessment findings), spoon theory to discuss cognitive versus physical versus emotional fatigue and management of each to more efficiently use energy daily for more or less cognitively demanding tasks, internal versus external distractions (including emotions, pain, stressors, etc) and management of each, as well as overall impact of these deficits on daily functioning. Patient demonstrated awareness of information provided as demonstrated by asking good questions and expressing understanding. Discussed specifically use of mindfulness and meditation/breathing strategies to improve cognitive functioning related to internal, specifically emotional stressors.       7. Patient will participate in continued cognitive assessment of right hemisphere dysfunction.     Goal Met 2023         GOAL MET 2023     Patient Education/Response:   Patient educated regarding the followin. Strategies for improved accuracy such a slowing down and re-checking work   2. Impact of specific right hemisphere dysfunction on reading, inattention/left neglect, ability to complete daily tasks     Home program established: Patient instructed to continue prior program  Patient verbalized understanding to all above education provided.     See Electronic Medical Record under Patient Instructions for exercises provided throughout therapy.  Assessment:   Alternating attention task introduced today. Patient with increased accuracy as trial progressed. She was able to recall the categories and occasionally able to recall which responses she had already given. For example, she would name  ""zebra" and demonstrate immediate self correction by stating "No, I already said that."     Leslie is progressing well towards her goals. Current goals remain appropriate. Goals to be updated as necessary.     Patient prognosis is Fair. Patient will continue to benefit from skilled outpatient speech and language therapy to address the deficits listed in the problem list on initial evaluation, provide patient/family education and to maximize patient's level of independence in the home and community environment.     Medical necessity is demonstrated by the following IMPAIRMENTS:  Cognition: Deficits in executive functioning, attention, and memory prevent the pt from relaying medically and safety relevant information in a timely manner in a state of emergency.     Barriers to Therapy: NA  Patient's spiritual, cultural and educational needs considered and patient agreeable to plan of care and goals.  Plan:   Continue Plan of Care with focus on rehabilitation and compensation for attention, memory, and executive functioning deficits impacting safety and efficiency of daily task completion.     Becca Blanco, CCC-SLP, CBIS   Speech Language Pathologist   Certified Brain Injury Specialist   3/11/2024   "

## 2024-03-11 NOTE — PROGRESS NOTES
[facility]  Occupational Therapy Treatment Note     Date: 3/11/2024  Name: Leslie CASTELLON [facility] Number: [id number]    Therapy Diagnosis:     Encounter Diagnoses   Name Primary?    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     Decreased  strength of left hand     Decreased strength, endurance, and mobility     Decreased activities of daily living (ADL)     Fine motor impairment        Physician: Marily Millan NP    Physician Orders: Occupational Therapy to Evaluate and Treat   Medical Diagnosis: R29.898 (ICD-10-CM) - Poor fine motor skills  Surgical Procedure and Date: N/A     Evaluation Date: 11/8/2023  Insurance Authorization Period Expiration: 11/8/2023 - 12/31/2023  Plan of Care Certification Period: 11/8/2023 - 02/08/2024  Updated Plan of Care Certification Period: 02/08/2024 - 05/21/2024  Progress Note Due: 3/21/2024     Date of Return to MD: N/A     Visit # / Visits authorized: 4/25 (9 Episode Visits)  FOTO: 1/3     Precautions:  Standard    Time In: 11:15  Time Out: 11:55  Total Treatment Time: 40 minutes  Total Billable Time: 40 minutes    Subjective     Patient reports: She is doing okay.She had a bad fall 2 weeks ago and hit her head. She has no injuries other than bruising     she was compliant with home exercise program given last session.   Response to previous treatment: Tolerated well  Functional change: Improved knowledge of HEP    Pain: 6/10  Location: bilateral hands     Objective     Please see updated plan of care dated 2/21/2024    Treatment     Supervised Modalities: Modalities such as hot/cold packs, traction, unattended electrical stimulation, paraffin bath, fluidotherapy to reduce pain and increase soft tissue extensibility. Leslie received (0) minutes of modalities listed below after being cleared for contraindications.  x = modalities performed     Supervised  Modalities 3/11/2024                            Manual Therapy Techniques: Joint mobilizations, Soft tissue Mobilization, and mobilization with movement applied to the area listed below. Leslie received (0) minutes of interventions. x = intervention performed today    Manual Intervention 3/11/2024    Soft Tissue Mobilization     Joint Mobilizations     Mobilization with movement              Therapeutic Exercises: to develop strength, endurance, ROM, flexibility, posture and core stabilization. Leslie received (15) minutes of exercises listed below. x = performed today * = level of progression of activity     Therapeutic Exercise 3/11/2024    Interossei  -green foam  -rubber bands x 20x bilateral    Digi flexion - red x 20x bilateral    Resistive pinch - red  -2pt  -3pt  -lateral x 20x bilateral    Thumb palmar   -adduction  -abduction x 20x bilateral    Thumb radial  -adduction  -abduction x 20x bilateral    Forearm exercise  - wrist flexion  -wrist extension  - supination  -pronation x 2lb        Therapeutic Activities: Everyday tasks to address the combined need of improved strength, range of motion, and endurance to achieve increased independence. While simulating these activities, the person is applying the gained skills of strength and improved range of motion in a practical way.  Leslie received (10) minutes of activities listed below. x = activities performed today * = level of progression of activity     Therapeutic Activities 3/11/2024    Velcro board  -lateral pinch  - dowel  x 20x bilateral    Opening and closing containers x 5 minutes, ergonomics               Neuromuscular Re-education: the use of functional strengthening, stretching, balancing and coordination activities that train the nerves and muscles to react and communicate to restore normal body movement patterns to increase self care independence. Leslie received (15) minutes of interventions listed below.  x = interventions performed today * =  level of progression of activity     Neuromuscular Re-education 3/11/2024    Finger opposition  x 20x   Finger taps (extension) x 20x   Finger opposition with slides x    Table Top active assist range of motion  - shoulder flexion  - shoulder abduction  x 5x - 15 second holds     Self Care: Fundamental skills required to independently care for oneself. Activities of daily living such as eating, bathing, dressing, toileting, grooming, sleeping, transfers and mobility. Instrumental activities of daily living such as driving, medication management, pet care, home management/cleaning, financial management, using the phone, meal preparation and shopping. Leslie received (0) minutes of interventions listed below.  x = interventions performed * = level of progression of activity     Self Care 3/11/2024                            Home Exercises and Education Provided     Education provided:   - home exercise program education provided  - progress towards goals     Written Home Exercises Provided: Patient instructed to cont prior HEP.  Exercises were reviewed and Leslie was able to demonstrate them prior to the end of the session. Leslie demonstrated good understanding of the home exercise program provided.     See EMR under Patient Instructions for exercises provided prior visit.        Assessment     Patient tolerated exercises well. Needs verbal cues for hand placement.     Leslie is progressing towards her goals and there are no updates to goals at this time. Patient prognosis is Good.     Patient will continue to benefit from skilled outpatient occupational therapy to address the deficits listed in the problem list on initial evaluation provide patient/family education and to maximize patient's level of independence in the home and community environment.     Patient's spiritual, cultural and educational needs considered and patient agreeable to plan of care and goals.    Anticipated barriers to occupational therapy: Cognition,  home exercise program compliance    Goals:     Short Term Goals:  6 weeks  Progress    Pain: Patient will demonstrate improved pain by reports of less than or equal to 7/10 worst pain on the verbal rating scale in order to progress toward maximal functional ability and improve quality of life. PC   Function: Patient will demonstrate improved function as indicated by a functional limitation score of less than or equal to 46 out of 100 on FOTO. PC   Mobility: Patient will improve active range of motion to 50% of stated goals, listed in objective measures above, in order to progress towards independence with functional activities.  PC   Strength: Patient will improve strength to 50% of stated goals, listed in objective measures above, in order to progress towards independence with functional activities.  PC   HEP: Patient will demonstrate independence with home exercise program in order to progress toward functional independence. PC      Long Term Goals:  12 weeks  Progress   Pain: Patient will demonstrate improved pain by reports of less than or equal to 6/10 worst pain on the verbal rating scale in order to progress toward maximal functional ability and improve quality of life.   PC   Function: Patient will demonstrate improved function as indicated by a functional limitation score of less than or equal to 48 out of 100 on FOTO. PC   Mobility: Patient will improve active range of motion to stated goals, listed in objective measures above, in order to return to maximal functional potential and improve quality of life. PC   Strength: Patient will improve strength to stated goals, listed in objective measures above, in order to improve functional independence and quality of life. PC   Patient will return to normal ADL's, IADL's, community involvement, recreational activities, and work-related activities with less than or equal to 5/10 pain and maximal function.  PC      Goals Key:  PC= Progressing/Continue;       PM=  Partially Met;       GM= Goal Met,      DC= Discontinue    Plan     Continue Plan of Care (POC) and progress per patient tolerance.      Ivana Gallo, OT  ,OTD, OTR/L

## 2024-03-14 ENCOUNTER — CLINICAL SUPPORT (OUTPATIENT)
Dept: REHABILITATION | Facility: HOSPITAL | Age: 84
End: 2024-03-14
Payer: MEDICARE

## 2024-03-14 DIAGNOSIS — R26.9 GAIT ABNORMALITY: ICD-10-CM

## 2024-03-14 DIAGNOSIS — R41.841 COGNITIVE COMMUNICATION DISORDER: Primary | ICD-10-CM

## 2024-03-14 DIAGNOSIS — R68.89 DECREASED STRENGTH, ENDURANCE, AND MOBILITY: ICD-10-CM

## 2024-03-14 DIAGNOSIS — Z74.09 DECREASED STRENGTH, ENDURANCE, AND MOBILITY: ICD-10-CM

## 2024-03-14 DIAGNOSIS — R53.1 DECREASED STRENGTH, ENDURANCE, AND MOBILITY: ICD-10-CM

## 2024-03-14 DIAGNOSIS — M46.1 SACROILIITIS, NOT ELSEWHERE CLASSIFIED: Primary | ICD-10-CM

## 2024-03-14 PROCEDURE — 97129 THER IVNTJ 1ST 15 MIN: CPT

## 2024-03-14 PROCEDURE — 97140 MANUAL THERAPY 1/> REGIONS: CPT

## 2024-03-14 PROCEDURE — 97130 THER IVNTJ EA ADDL 15 MIN: CPT

## 2024-03-14 PROCEDURE — 97110 THERAPEUTIC EXERCISES: CPT

## 2024-03-14 PROCEDURE — 97112 NEUROMUSCULAR REEDUCATION: CPT

## 2024-03-14 NOTE — PROGRESS NOTES
[facility]  Speech Therapy Treatment Note- Neurological Rehabilitation  Date: 3/14/2024     Name: Leslie Wood   MRN: [id number]   Therapy Diagnosis:   Encounter Diagnosis   Name Primary?    Cognitive communication disorder Yes   Physician: Marily Millan NP  Physician Orders: Ambulatory Referral to Speech Therapy   Medical Diagnosis: Disorientation [R41.0], Aphasia [R47.01]     Visit #/ Visits Authorized: 6/ 12 (+eval, +5)  Date of Evaluation:  11/8/23  Insurance Authorization Period: 11/15/2023 - 12/31/2023   Plan of Care Expiration Date:    1/31/24  Extended Plan of Care:  2/21/24-5/1/24  Progress Note: 3/21/24     Time In:  9:45 AM  Time Out:  10:28 AM  Total Billable Time: 43 mins      Precautions: Standard, Fall, Cognition, and Communication  Subjective:   Patient reports: she has been doing okay. She has had difficulty sleeping.   She was compliant to home exercise program.     Response to previous treatment: good    Pain Scale: 7/10 on a Visual Analog Scale currently.  Pain Location: lower back  Objective:   TIMED  Procedure Min.   Cognitive Therapeutic Interventions, first 15 minutes CPT 37493  15   Cognitive Therapeutic Interventions, each additional 15 minutes CPT 13204  28           Short Term Goals: (6 weeks) Current Progress:   Patient will sustain attention to a simple task (auditory or visual) for 3 minutes with 90% accuracy or no more than 2 redirections.      Goal Met 12/13/2023   Auditory: met x2  Visual: met x7 Auditory Sustained Attention:  Several sustained auditory attention tasks completed in today's session. Patient listened to multi-step commands and followed with 91% accuracy. She required initial verbal cueing for use of strategies including taking requesting repetition as needed. Improved use of strategies independently with overall improved accuracy in today's session.    Visual Sustained Attention:  Patient completed several visual sustained attention tasks in  today's session in which she visually scanned for letters/numbers in a paragraph.   Trial 1: 1:30, 100% accuracy, 1/7 relative scale of cognitive load   Trial 2: 1:45, 100% accuracy, 1/7 relative scale of cognitive load   Trial 3: 1:30, 100% accuracy, 1/7 relative scale of cognitive load    Trial 4: 2:08, -7 (missed entire row of scanning)   Trial 5: 2:10, 100% accuracy, 1/7 relative scale of cognitive load   Trial 6: 1:30, 100% accuracy, 1/7 relative scale of cognitive load     Overall improved use of strategies to facilitate visual scanning pattern as well as good awareness of errors. Overall high accuracy of completion with low cognitive load.       2. Patient will complete selective attention tasks (auditory or visual) with 85% accuracy independently increase selective attention.      Progressing/ Not Met 3/14/2024   Auditory: met x5  Visual: met x1 Auditory Selective Attention:  Patient listened for and followed complex 2 step commands in a dynamic environment with auditory and visual distractions during completion. She followed commands with 92% accuracy and initial verbal cueing for use of strategies. Discussion of how this relates to conversation and asking clarifying questions to facilitate attention and information processing. She rated task as 2/7 on relative scale of cognitive load.    She then listened to a 5 minute video about how basketballs are made. She requested to take notes as needed and paused/rewound video as needed. She answered questions about what she heard with 92% accuracy and rated task as 2/7 on relative scale of cognitive load.     Visual Selective Attention:  Patient participated in visual selective attention tasks of locating letters in alphabetical order in a jumbled paragraph.     Trial 1: She completed the task with 85% accuracy using strategy of a blank page to keep track of which line she was on.     Trial 2: She completed the task with 85% accuracy using strategy of a blank page  to keep track of which line she was on.   3. Patient will use attention shifting strategies to shift attention between two tasks (auditory or visual) with no more than 3 cues or 90% accuracy to improve alternating attention.                    Progressing/ Not Met 3/14/2024  Auditory Alternating Attention:   Patient completed auditory alternating attention task of listing items in two categories in an alternating fashion (I.e. one state, one color, one state, one color).  Trial 1: Patient with increased difficulty with alternating attention today. She required a consistent visual cue for recalling the categories. Patient reported she did not sleep well last night. Patient and clinician discussed how sleep impacts overall cognition.    4. Patient will recall functional information (name, birthday, address, etc) following delays of 2-3 minutes following a prompt questions across 3 therapy sessions with 80% accuracy (spaced retrieval).      Progressing/ Not Met 3/14/2024   Not formally addressed     5. Patient will complete a functional task to improve attention, memory and/or executive functions (I.e. sample bill paying activity, recipe, or multiple choice comprehension questions to 1 paragraphs) with 80% accuracy and natural environment noise distractions (TV news background, music, etc.).      Progressing/ Not Met 3/14/2024    Not formally addressed     6. Patient will be educated regarding cognitive deficits as applicable to the patient's life for increased awareness and will execute returned demonstration of information with 80% accuracy.       Goal Met 1/24/2024  Patient and her granddaughter again educated regarding nature of cognitive deficits including a variety of foundational cognitive elements related to R hemisphere dysfunction following CVA including the cognitive triangle (with emphasis on foundational versus higher level cognitive functions, awareness of deficits, impact of attention, information  processing, memory, and executive functioning deficits as each area relates to assessment findings), spoon theory to discuss cognitive versus physical versus emotional fatigue and management of each to more efficiently use energy daily for more or less cognitively demanding tasks, internal versus external distractions (including emotions, pain, stressors, etc) and management of each, as well as overall impact of these deficits on daily functioning. Patient demonstrated awareness of information provided as demonstrated by asking good questions and expressing understanding. Discussed specifically use of mindfulness and meditation/breathing strategies to improve cognitive functioning related to internal, specifically emotional stressors.       7. Patient will participate in continued cognitive assessment of right hemisphere dysfunction.     Goal Met 2023         GOAL MET 2023     Patient Education/Response:   Patient educated regarding the followin. Strategies for improved accuracy such a slowing down and re-checking work   2. How lack of sleep may impact attention/memory cognition.     Home program established: Patient instructed to continue prior program  Patient verbalized understanding to all above education provided.     See Electronic Medical Record under Patient Instructions for exercises provided throughout therapy.  Assessment:   Alternating attention task introduced today. Patient with decreased accuracy with alternating attention tasks. Clinician and patient discussed how lack of sleep may impact attention/cognition. Patient verbalized understanding.     Leslie is progressing well towards her goals. Current goals remain appropriate. Goals to be updated as necessary.     Patient prognosis is Fair. Patient will continue to benefit from skilled outpatient speech and language therapy to address the deficits listed in the problem list on initial evaluation, provide patient/family education and to  maximize patient's level of independence in the home and community environment.     Medical necessity is demonstrated by the following IMPAIRMENTS:  Cognition: Deficits in executive functioning, attention, and memory prevent the pt from relaying medically and safety relevant information in a timely manner in a state of emergency.     Barriers to Therapy: NA  Patient's spiritual, cultural and educational needs considered and patient agreeable to plan of care and goals.  Plan:   Continue Plan of Care with focus on rehabilitation and compensation for attention, memory, and executive functioning deficits impacting safety and efficiency of daily task completion.     Becca Blanco, CCC-SLP, CBIS   Speech Language Pathologist   Certified Brain Injury Specialist   3/14/2024

## 2024-03-15 DIAGNOSIS — I10 HYPERTENSION, UNSPECIFIED TYPE: ICD-10-CM

## 2024-03-15 NOTE — PROGRESS NOTES
[facility]   Physical Therapy Treatment Note        Name: Leslie CASTELLON [facility] Number: [id number]    Therapy Diagnosis:   Encounter Diagnoses   Name Primary?    Sacroiliitis, not elsewhere classified Yes    Decreased strength, endurance, and mobility     Gait abnormality        Physician: Marily Millan NP    Visit Date: 3/14/2024    Physician Orders: PT Eval and Treat   Medical Diagnosis from Referral:   Late effect of cerebrovascular accident (CVA)   Hemiplegia and hemiparesis following cerebral infarction affecting left non-dominant side   Gait difficulty   Evaluation Date: 11/16/2023  Authorization Period Expiration: 10/16/2024  Plan of Care Expiration: 4/19/2024  Progress Note Due: 3/19/2024  Visit # / Visits authorized: 4/12 (+eval)  FOTO: 1/3      Precautions: Standard and Fall (1/8/24 - patient reports history of 3 back fusion surgeries)  PTA Visit #: 0/5     Time In: 1045  Time Out: 1145  Total Billable Time: 60 minutes   (Billing reflects 1 on 1 treatment time spent with patient)    Patient reports:that she experiences mild lower back pain today    He/She was partially compliant with home exercise program.  Response to previous treatment: some pain relief  Functional change: Some increased speed with walking    Pain: 3/10     Location: lumbar paraspinal region    Objective      Objective Measures updated at progress report or POC update only unless otherwise noted.     Lumbar Spine ROM: %, BB 75% pain lumbar, R %, L %, R Rot 50% pain right lumbar, L Rot 66%     Palpation: Trigger points on palpation of the lumbar paraspinal musculature    Treatment     Leslie received the treatments listed below:     THERAPEUTIC EXERCISES to develop strength, endurance, ROM, flexibility, posture, and core stabilization for (30) minutes including:    Intervention Performed Today    Nustep  x  15 minutes, L3   Bicycle  x X 8m   Lower trunk rotations x 10 second holds x 10  "  Pelvic tilts x Anterior/posterior with cueing- 10 second holds x 2 min   Sitting flexion with swiss ball  10x   Isometric adduction with ball   3  min   Abdominal brace x With single knee to chest and slowly lowering - 2x10   Supine x Active straight leg raise - 2x10   Long Arc Quads x 3# ankle weight - 3x15       Leslie participated in neuromuscular re-education activities to improve: Balance, Coordination, Proprioception, and Posture for 15 minutes. The following activities were included:      Intervention 12/12/2023  Parameters   Sit to stand off mat from mat x 10x/ 2 sets hands together   Prone hip extension x 10x with cues   Sidelying hip series  2x10 abduction  10x/ 2 sets clamshell   Bridging  2x10   Step Ups x 4 inch box   Standing X  X  X Hip Extension - 2x10  Hip Abduction - 2x10  Marching - 2x10             Plan for Next Visit: Progress as indicated        MANUAL THERAPY TECHNIQUES were applied for (15) minutes, including:    Manual Intervention Performed Today    Soft Tissue Mobilization x  STM bilateral multifidus and gluteal/ piriformis,   Joint Mobilizations x PA glides lumbosacral   Mobilization with movement     Muscle energy techniques   "Shotgun" for + SI provocation   Functional Dry Needling  x FDN performed to the bilateral lumbosacral paraspinal/multifidus     Plan for Next Visit:         Patient Education and Home Exercises       Home Exercises Provided and Patient Education Provided     Education provided: (5) minutes  Moist heat pack to low back prior to exercises at home.    Written Home Exercises Provided: yes.  Exercises were reviewed and Leslie was able to demonstrate them prior to the end of the session.  Leslie demonstrated fair  understanding of the education provided. See EMR under Patient Instructions for exercises provided during therapy sessions.    Assessment     Patient reports that she has experienced significant pain relief at right knee since undergoing injection.  She does " report of pain at the lumbar spine.  Worked again on multiple exercises with good performance.  Also performed FDN at the lumbar spine and did report of pain relief.  Will continue to progress into more strengthening and stretching with continued treatment.  Still asking family to get the patient involved in an exercise group outside of therapy.    Leslie is progressing well towards her goals.   Patient prognosis is Fair.     Patient will continue to benefit from skilled outpatient physical therapy to address the deficits listed in the problem list box on initial evaluation, provide pt/family education and to maximize patient's level of independence in the home and community environment.     Patient's spiritual, cultural and educational needs considered and pt agreeable to plan of care and goals.     Anticipated Barriers for therapy: Anxiety or Panic Disorders, Arthritis, Back pain, Depression, Headaches, High  Blood Pressure, Kidney, Bladder, Prostate or Urination Problems, Osteoporosis, Prior Surgery, Stroke or TIA, Visual Impairment      Goals: Reviewed:3/14/2024       Short Term Goals: In 4 weeks   1.Patient to be educated on HEP. - met  2. Patient  to increase lumbar spine ROM to B SB 30 deg, B Rot 75% - PC  3. Patient  to increase hip PROM to 60 deg ER bilaterally, IR 20 deg bilaterally, in order to assist with more normalized gait pattern. - met  4. Patient  to increase B LE and core strength to 4-/5 in order to improve gait and balance.  - PC  5. Patient to increased right knee extn ROM to -5 deg for improved gait mechanics- PC  6. Patient   to have pain less than 2/10 at worst, to improve QOL. - PC  7. Patient  to improve score on the FOTO, to improve QOL.- PC  8. Patient  to be educated on postural/body mechanics awareness. - PC     Long Term Goals: In 8 weeks  1.Patient to improve score on the FOTO to 48 or better, to improve QOL. - PC  2. Patient to demo increase in LE strength to 4/5, in order to improve  endurance and increase ability to ambulate for increased time. - PC  3. Patient to have decreased pain to 2/10 at worst, to improve QOL. - met  4. Patient to demo increase lumbar ROM to ,  SB 30 deg, B Rot 90%in order to improve available range of motion for ADL's. - PC  .  5. Patient  to increase hip PROM to Extn +5 deg,  60 deg ER bilaterally, IR 20 deg bilaterally, in order to assist with more normalized gait pattern. - PC  6. Patient to increased right knee extn ROM to -5 deg for improved gait mechanics - PC  6. Patient to perform daily activities without increased symptoms including:  Prolonged walking x 10 minutes. - met  Ascending.descending 6 inch step without upper extremities assistance - PC  Return to gym and work outings 2 times per week with family transportation - met         Plan     Would like to extend treatment for 2 more months to allow the patient to return to an exercise regimen as she has had limited exercises over the last few weeks with the recent death of the patient's daughter.      Leoncio Lujan, PT

## 2024-03-18 ENCOUNTER — CLINICAL SUPPORT (OUTPATIENT)
Dept: REHABILITATION | Facility: HOSPITAL | Age: 84
End: 2024-03-18
Payer: MEDICARE

## 2024-03-18 DIAGNOSIS — Z78.9 DECREASED ACTIVITIES OF DAILY LIVING (ADL): ICD-10-CM

## 2024-03-18 DIAGNOSIS — R68.89 DECREASED STRENGTH, ENDURANCE, AND MOBILITY: ICD-10-CM

## 2024-03-18 DIAGNOSIS — R53.1 DECREASED STRENGTH, ENDURANCE, AND MOBILITY: ICD-10-CM

## 2024-03-18 DIAGNOSIS — Z74.09 DECREASED STRENGTH, ENDURANCE, AND MOBILITY: ICD-10-CM

## 2024-03-18 DIAGNOSIS — R29.898 DECREASED GRIP STRENGTH OF LEFT HAND: ICD-10-CM

## 2024-03-18 DIAGNOSIS — I69.30 LATE EFFECT OF CEREBROVASCULAR ACCIDENT (CVA): Primary | ICD-10-CM

## 2024-03-18 DIAGNOSIS — R41.841 COGNITIVE COMMUNICATION DISORDER: Primary | ICD-10-CM

## 2024-03-18 DIAGNOSIS — I69.354 HEMIPLEGIA AND HEMIPARESIS FOLLOWING CEREBRAL INFARCTION AFFECTING LEFT NON-DOMINANT SIDE: ICD-10-CM

## 2024-03-18 DIAGNOSIS — R29.818 FINE MOTOR IMPAIRMENT: ICD-10-CM

## 2024-03-18 DIAGNOSIS — R29.898 FINE MOTOR IMPAIRMENT: ICD-10-CM

## 2024-03-18 PROCEDURE — 97110 THERAPEUTIC EXERCISES: CPT

## 2024-03-18 PROCEDURE — 97530 THERAPEUTIC ACTIVITIES: CPT

## 2024-03-18 PROCEDURE — 97129 THER IVNTJ 1ST 15 MIN: CPT

## 2024-03-18 PROCEDURE — 97112 NEUROMUSCULAR REEDUCATION: CPT

## 2024-03-18 PROCEDURE — 97130 THER IVNTJ EA ADDL 15 MIN: CPT

## 2024-03-18 NOTE — PROGRESS NOTES
[facility]  Occupational Therapy Treatment Note     Date: 3/18/2024  Name: Leslie CASTELLON [facility] Number: [id number]    Therapy Diagnosis:     Encounter Diagnoses   Name Primary?    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     Decreased  strength of left hand     Decreased strength, endurance, and mobility     Decreased activities of daily living (ADL)     Fine motor impairment        Physician: Marily Millan NP    Physician Orders: Occupational Therapy to Evaluate and Treat   Medical Diagnosis: R29.898 (ICD-10-CM) - Poor fine motor skills  Surgical Procedure and Date: N/A     Evaluation Date: 11/8/2023  Insurance Authorization Period Expiration: 11/8/2023 - 12/31/2023  Plan of Care Certification Period: 11/8/2023 - 02/08/2024  Updated Plan of Care Certification Period: 02/08/2024 - 05/21/2024  Progress Note Due: 3/21/2024     Date of Return to MD: N/A     Visit # / Visits authorized: 5/25 (11 Episode Visits)  FOTO: 1/3     Precautions:  Standard    Time In: 11:15  Time Out: 11:55  Total Treatment Time: 40 minutes  Total Billable Time: 40 minutes    Subjective     Patient reports: She is doing okay. She has been staying with her granddaughter     she was compliant with home exercise program given last session.   Response to previous treatment: Tolerated well  Functional change: Improved knowledge of HEP    Pain: 4/10  Location: bilateral hands     Objective     Please see updated plan of care dated 2/21/2024    Treatment     Supervised Modalities: Modalities such as hot/cold packs, traction, unattended electrical stimulation, paraffin bath, fluidotherapy to reduce pain and increase soft tissue extensibility. Leslie received (0) minutes of modalities listed below after being cleared for contraindications.  x = modalities performed     Supervised Modalities 3/18/2024                             Manual Therapy Techniques: Joint mobilizations, Soft tissue Mobilization, and mobilization with movement applied to the area listed below. Leslie received (0) minutes of interventions. x = intervention performed today    Manual Intervention 3/18/2024    Soft Tissue Mobilization     Joint Mobilizations     Mobilization with movement              Therapeutic Exercises: to develop strength, endurance, ROM, flexibility, posture and core stabilization. Leslie received (15) minutes of exercises listed below. x = performed today * = level of progression of activity     Therapeutic Exercise 3/18/2024    Interossei  -green foam  -rubber bands x 20x bilateral    Digi flexion - red x 20x bilateral    Resistive pinch - red  -2pt  -3pt  -lateral x 20x bilateral    Thumb palmar   -adduction  -abduction x 20x bilateral    Thumb radial  -adduction  -abduction x 20x bilateral    Forearm exercise  - wrist flexion  -wrist extension  - supination  -pronation x 2lb        Therapeutic Activities: Everyday tasks to address the combined need of improved strength, range of motion, and endurance to achieve increased independence. While simulating these activities, the person is applying the gained skills of strength and improved range of motion in a practical way.  Leslie received (10) minutes of activities listed below. x = activities performed today * = level of progression of activity     Therapeutic Activities 3/18/2024    Velcro board  -lateral pinch  - dowel  x 20x bilateral    Opening and closing containers x 5 minutes, ergonomics               Neuromuscular Re-education: the use of functional strengthening, stretching, balancing and coordination activities that train the nerves and muscles to react and communicate to restore normal body movement patterns to increase self care independence. Leslie received (15) minutes of interventions listed below.  x = interventions performed today * = level of progression of activity      Neuromuscular Re-education 3/18/2024    Finger opposition  x 20x   Finger taps (extension) x 20x   Finger opposition with slides x    Table Top active assist range of motion  - shoulder flexion  - shoulder abduction  x 5x - 15 second holds     Self Care: Fundamental skills required to independently care for oneself. Activities of daily living such as eating, bathing, dressing, toileting, grooming, sleeping, transfers and mobility. Instrumental activities of daily living such as driving, medication management, pet care, home management/cleaning, financial management, using the phone, meal preparation and shopping. Leslie received (0) minutes of interventions listed below.  x = interventions performed * = level of progression of activity     Self Care 3/18/2024                            Home Exercises and Education Provided     Education provided:   - home exercise program education provided  - progress towards goals     Written Home Exercises Provided: Patient instructed to cont prior HEP.  Exercises were reviewed and Leslie was able to demonstrate them prior to the end of the session. Leslie demonstrated good understanding of the home exercise program provided.     See EMR under Patient Instructions for exercises provided prior visit.        Assessment     Patient tolerated exercises well. Needs verbal cues for hand placement.     Leslie is progressing towards her goals and there are no updates to goals at this time. Patient prognosis is Good.     Patient will continue to benefit from skilled outpatient occupational therapy to address the deficits listed in the problem list on initial evaluation provide patient/family education and to maximize patient's level of independence in the home and community environment.     Patient's spiritual, cultural and educational needs considered and patient agreeable to plan of care and goals.    Anticipated barriers to occupational therapy: Cognition, home exercise program  compliance    Goals:     Short Term Goals:  6 weeks  Progress    Pain: Patient will demonstrate improved pain by reports of less than or equal to 7/10 worst pain on the verbal rating scale in order to progress toward maximal functional ability and improve quality of life. PC   Function: Patient will demonstrate improved function as indicated by a functional limitation score of less than or equal to 46 out of 100 on FOTO. PC   Mobility: Patient will improve active range of motion to 50% of stated goals, listed in objective measures above, in order to progress towards independence with functional activities.  PC   Strength: Patient will improve strength to 50% of stated goals, listed in objective measures above, in order to progress towards independence with functional activities.  PC   HEP: Patient will demonstrate independence with home exercise program in order to progress toward functional independence. PC      Long Term Goals:  12 weeks  Progress   Pain: Patient will demonstrate improved pain by reports of less than or equal to 6/10 worst pain on the verbal rating scale in order to progress toward maximal functional ability and improve quality of life.   PC   Function: Patient will demonstrate improved function as indicated by a functional limitation score of less than or equal to 48 out of 100 on FOTO. PC   Mobility: Patient will improve active range of motion to stated goals, listed in objective measures above, in order to return to maximal functional potential and improve quality of life. PC   Strength: Patient will improve strength to stated goals, listed in objective measures above, in order to improve functional independence and quality of life. PC   Patient will return to normal ADL's, IADL's, community involvement, recreational activities, and work-related activities with less than or equal to 5/10 pain and maximal function.  PC      Goals Key:  PC= Progressing/Continue;       PM= Partially Met;       GM=  Goal Met,      DC= Discontinue    Plan     Continue Plan of Care (POC) and progress per patient tolerance.      Ivana Gallo, OT  ,OTD, OTR/L

## 2024-03-18 NOTE — PROGRESS NOTES
[facility]  Speech Therapy Treatment Note- Neurological Rehabilitation  Date: 3/18/2024     Name: Leslie Wood   MRN: [id number]   Therapy Diagnosis:   Encounter Diagnosis   Name Primary?    Cognitive communication disorder Yes   Physician: Marily Millan NP  Physician Orders: Ambulatory Referral to Speech Therapy   Medical Diagnosis: Disorientation [R41.0], Aphasia [R47.01]     Visit #/ Visits Authorized: 6/ 12 (+eval, +5)  Date of Evaluation:  11/8/23  Insurance Authorization Period: 11/15/2023 - 12/31/2023   Plan of Care Expiration Date:    1/31/24  Extended Plan of Care:  2/21/24-5/1/24  Progress Note: 3/21/24     Time In:  10:37 AM  Time Out:  11:15 AM  Total Billable Time: 38 mins      Precautions: Standard, Fall, Cognition, and Communication  Subjective:   Patient reports: she has been doing okay. She has had difficulty sleeping.   She was compliant to home exercise program.     Response to previous treatment: good    Pain Scale: 7/10 on a Visual Analog Scale currently.  Pain Location: lower back  Objective:   TIMED  Procedure Min.   Cognitive Therapeutic Interventions, first 15 minutes CPT 17687  15   Cognitive Therapeutic Interventions, each additional 15 minutes CPT 62573  23           Short Term Goals: (6 weeks) Current Progress:   Patient will sustain attention to a simple task (auditory or visual) for 3 minutes with 90% accuracy or no more than 2 redirections.      Goal Met 12/13/2023   Auditory: met x2  Visual: met x7 Auditory Sustained Attention:  Several sustained auditory attention tasks completed in today's session. Patient listened to multi-step commands and followed with 91% accuracy. She required initial verbal cueing for use of strategies including taking requesting repetition as needed. Improved use of strategies independently with overall improved accuracy in today's session.    Visual Sustained Attention:  Patient completed several visual sustained attention tasks in  today's session in which she visually scanned for letters/numbers in a paragraph.   Trial 1: 1:30, 100% accuracy, 1/7 relative scale of cognitive load   Trial 2: 1:45, 100% accuracy, 1/7 relative scale of cognitive load   Trial 3: 1:30, 100% accuracy, 1/7 relative scale of cognitive load    Trial 4: 2:08, -7 (missed entire row of scanning)   Trial 5: 2:10, 100% accuracy, 1/7 relative scale of cognitive load   Trial 6: 1:30, 100% accuracy, 1/7 relative scale of cognitive load     Overall improved use of strategies to facilitate visual scanning pattern as well as good awareness of errors. Overall high accuracy of completion with low cognitive load.       2. Patient will complete selective attention tasks (auditory or visual) with 85% accuracy independently increase selective attention.                                                GOAL MET 3/18/2024 Auditory Selective Attention:  Patient listened for and followed complex 2 step commands in a dynamic environment with auditory and visual distractions during completion. She followed commands with 92% accuracy and initial verbal cueing for use of strategies. Discussion of how this relates to conversation and asking clarifying questions to facilitate attention and information processing. She rated task as 2/7 on relative scale of cognitive load.    She then listened to a 5 minute video about how basketballs are made. She requested to take notes as needed and paused/rewound video as needed. She answered questions about what she heard with 92% accuracy and rated task as 2/7 on relative scale of cognitive load.     Visual Selective Attention:  Patient participated in visual selective attention task of N-Back (level 1) pictures with natural background noise (therapy door remained opened.   Trial 1: Patient completed task with 86% accuracy given minimal cueing from clinician  Trial 2: Patient completed task with 88% accuracy independently.     GOAL MET 3/18/2024   3. Patient  will use attention shifting strategies to shift attention between two tasks (auditory or visual) with no more than 3 cues or 90% accuracy to improve alternating attention.              Progressing/ Not Met 3/18/2024  Auditory Alternating Attention:   Patient completed auditory alternating attention task of listing items in two categories in an alternating fashion (I.e. one state, one color, one state, one color).  She completed this task with consistent moderate cueing to recall previously given responses. However, improvement in recalling the categories today.          4. Patient will recall functional information (name, birthday, address, etc) following delays of 2-3 minutes following a prompt questions across 3 therapy sessions with 80% accuracy (spaced retrieval).      Progressing/ Not Met 3/18/2024   Not formally addressed     5. Patient will complete a functional task to improve attention, memory and/or executive functions (I.e. sample bill paying activity, recipe, or multiple choice comprehension questions to 1 paragraphs) with 80% accuracy and natural environment noise distractions (TV news background, music, etc.).      Progressing/ Not Met 3/18/2024    Not formally addressed     6. Patient will be educated regarding cognitive deficits as applicable to the patient's life for increased awareness and will execute returned demonstration of information with 80% accuracy.       Goal Met 1/24/2024  Patient and her granddaughter again educated regarding nature of cognitive deficits including a variety of foundational cognitive elements related to R hemisphere dysfunction following CVA including the cognitive triangle (with emphasis on foundational versus higher level cognitive functions, awareness of deficits, impact of attention, information processing, memory, and executive functioning deficits as each area relates to assessment findings), spoon theory to discuss cognitive versus physical versus emotional  fatigue and management of each to more efficiently use energy daily for more or less cognitively demanding tasks, internal versus external distractions (including emotions, pain, stressors, etc) and management of each, as well as overall impact of these deficits on daily functioning. Patient demonstrated awareness of information provided as demonstrated by asking good questions and expressing understanding. Discussed specifically use of mindfulness and meditation/breathing strategies to improve cognitive functioning related to internal, specifically emotional stressors.       7. Patient will participate in continued cognitive assessment of right hemisphere dysfunction.     Goal Met 2023         GOAL MET 2023     Patient Education/Response:   Patient educated regarding the followin. Strategies for improved accuracy such a slowing down and re-checking work   2. How lack of sleep may impact attention/memory cognition.     Home program established: Patient instructed to continue prior program  Patient verbalized understanding to all above education provided.     See Electronic Medical Record under Patient Instructions for exercises provided throughout therapy.  Assessment:   Alternating attention task introduced today. Patient with increased accuracy with all tasks today. She reported she slept well last night. Clinician and patient discussed how sleep may impact attention/cognition. Patient verbalized understanding.     Leslie is progressing well towards her goals. Current goals remain appropriate. Goals to be updated as necessary.     Patient prognosis is Fair. Patient will continue to benefit from skilled outpatient speech and language therapy to address the deficits listed in the problem list on initial evaluation, provide patient/family education and to maximize patient's level of independence in the home and community environment.     Medical necessity is demonstrated by the following  IMPAIRMENTS:  Cognition: Deficits in executive functioning, attention, and memory prevent the pt from relaying medically and safety relevant information in a timely manner in a state of emergency.     Barriers to Therapy: NA  Patient's spiritual, cultural and educational needs considered and patient agreeable to plan of care and goals.  Plan:   Continue Plan of Care with focus on rehabilitation and compensation for attention, memory, and executive functioning deficits impacting safety and efficiency of daily task completion.     Becca Blanco, CCC-SLP, CBIS   Speech Language Pathologist   Certified Brain Injury Specialist   3/18/2024

## 2024-03-19 RX ORDER — LOSARTAN POTASSIUM 50 MG/1
50 TABLET ORAL 2 TIMES DAILY
Qty: 180 TABLET | Refills: 1 | Status: SHIPPED | OUTPATIENT
Start: 2024-03-19

## 2024-03-21 ENCOUNTER — CLINICAL SUPPORT (OUTPATIENT)
Dept: REHABILITATION | Facility: HOSPITAL | Age: 84
End: 2024-03-21
Payer: MEDICARE

## 2024-03-21 ENCOUNTER — TELEPHONE (OUTPATIENT)
Dept: INTERNAL MEDICINE | Facility: CLINIC | Age: 84
End: 2024-03-21
Payer: MEDICARE

## 2024-03-21 DIAGNOSIS — R41.841 COGNITIVE COMMUNICATION DISORDER: Primary | ICD-10-CM

## 2024-03-21 DIAGNOSIS — R53.1 DECREASED STRENGTH, ENDURANCE, AND MOBILITY: ICD-10-CM

## 2024-03-21 DIAGNOSIS — R68.89 DECREASED STRENGTH, ENDURANCE, AND MOBILITY: ICD-10-CM

## 2024-03-21 DIAGNOSIS — I69.354 HEMIPLEGIA AND HEMIPARESIS FOLLOWING CEREBRAL INFARCTION AFFECTING LEFT NON-DOMINANT SIDE: Primary | ICD-10-CM

## 2024-03-21 DIAGNOSIS — M46.1 SACROILIITIS, NOT ELSEWHERE CLASSIFIED: Primary | ICD-10-CM

## 2024-03-21 DIAGNOSIS — Z74.09 DECREASED STRENGTH, ENDURANCE, AND MOBILITY: ICD-10-CM

## 2024-03-21 DIAGNOSIS — R26.9 GAIT ABNORMALITY: ICD-10-CM

## 2024-03-21 PROCEDURE — 97130 THER IVNTJ EA ADDL 15 MIN: CPT

## 2024-03-21 PROCEDURE — 97112 NEUROMUSCULAR REEDUCATION: CPT

## 2024-03-21 PROCEDURE — 97110 THERAPEUTIC EXERCISES: CPT

## 2024-03-21 PROCEDURE — 97129 THER IVNTJ 1ST 15 MIN: CPT

## 2024-03-21 NOTE — PROGRESS NOTES
[facility]   Physical Therapy Treatment Note        Name: Leslie CASTELLON [facility] Number: [id number]    Therapy Diagnosis:   Encounter Diagnoses   Name Primary?    Sacroiliitis, not elsewhere classified Yes    Decreased strength, endurance, and mobility     Gait abnormality        Physician: Marily Millan NP    Visit Date: 3/21/2024    Physician Orders: PT Eval and Treat   Medical Diagnosis from Referral:   Late effect of cerebrovascular accident (CVA)   Hemiplegia and hemiparesis following cerebral infarction affecting left non-dominant side   Gait difficulty   Evaluation Date: 11/16/2023  Authorization Period Expiration: 10/16/2024  Plan of Care Expiration: 4/19/2024  Progress Note Due: 3/19/2024  Visit # / Visits authorized: 4/12 (+eval)  FOTO: 1/3      Precautions: Standard and Fall (1/8/24 - patient reports history of 3 back fusion surgeries)  PTA Visit #: 0/5     Time In: 1045  Time Out: 1145  Total Billable Time: 60 minutes   (Billing reflects 1 on 1 treatment time spent with patient)    Patient reports:that she experiences mild lower back pain today    He/She was partially compliant with home exercise program.  Response to previous treatment: some pain relief  Functional change: Some increased speed with walking    Pain: 3/10     Location: lumbar paraspinal region    Objective      Objective Measures updated at progress report or POC update only unless otherwise noted.     Lumbar Spine ROM: %, BB 75% pain lumbar, R %, L %, R Rot 50% pain right lumbar, L Rot 66%     Palpation: Trigger points on palpation of the lumbar paraspinal musculature    Treatment     Leslie received the treatments listed below:     THERAPEUTIC EXERCISES to develop strength, endurance, ROM, flexibility, posture, and core stabilization for (30) minutes including:    Intervention Performed Today    Nustep    15 minutes, L3   Bicycle  x X 8m   Lower trunk rotations x 10 second holds x 10   Pelvic  "tilts X Anterior/posterior with cueing- 10 second holds x 2 min   Sitting flexion with swiss ball  10x   Isometric adduction with ball   3  min   Abdominal brace x With single knee to chest and slowly lowering - 2x10   Supine x Active straight leg raise - 2x10   Long Arc Quads x 3# ankle weight - 3x15       Leslie participated in neuromuscular re-education activities to improve: Balance, Coordination, Proprioception, and Posture for 30 minutes. The following activities were included:      Intervention 12/12/2023  Parameters   Sit to stand off mat from mat  10x/ 2 sets hands together   Prone hip extension x 10x with cues   Sidelying hip series  2x10 abduction  10x/ 2 sets clamshell   Bridging x 10 second holds x 2m   Step Ups x 4 inch box   Standing X  X  X Hip Extension - 2x10  Hip Abduction - 2x10  Marching - 2x10   Tandem stance x Hand Touches - 2x10        Plan for Next Visit: Progress as indicated        MANUAL THERAPY TECHNIQUES were applied for (0) minutes, including:    Manual Intervention Performed Today    Soft Tissue Mobilization   STM bilateral multifidus and gluteal/ piriformis,   Joint Mobilizations  PA glides lumbosacral   Mobilization with movement     Muscle energy techniques   "Shotgun" for + SI provocation   Functional Dry Needling   FDN performed to the bilateral lumbosacral paraspinal/multifidus     Plan for Next Visit:         Patient Education and Home Exercises       Home Exercises Provided and Patient Education Provided     Education provided: (5) minutes  Moist heat pack to low back prior to exercises at home.    Written Home Exercises Provided: yes.  Exercises were reviewed and Leslie was able to demonstrate them prior to the end of the session.  Leslie demonstrated fair  understanding of the education provided. See EMR under Patient Instructions for exercises provided during therapy sessions.    Assessment     Patient reports that symptoms are significantly decreased at the lower back.  She also " reports that knee symptoms remain decreased.  Continued working on lumbar ROM, core stabilization, and also worked into more closed chain activities.  Good tolerance with exercises today.    Leslie is progressing well towards her goals.   Patient prognosis is Fair.     Patient will continue to benefit from skilled outpatient physical therapy to address the deficits listed in the problem list box on initial evaluation, provide pt/family education and to maximize patient's level of independence in the home and community environment.     Patient's spiritual, cultural and educational needs considered and pt agreeable to plan of care and goals.     Anticipated Barriers for therapy: Anxiety or Panic Disorders, Arthritis, Back pain, Depression, Headaches, High  Blood Pressure, Kidney, Bladder, Prostate or Urination Problems, Osteoporosis, Prior Surgery, Stroke or TIA, Visual Impairment      Goals: Reviewed:3/21/2024       Short Term Goals: In 4 weeks   1.Patient to be educated on HEP. - met  2. Patient  to increase lumbar spine ROM to B SB 30 deg, B Rot 75% - PC  3. Patient  to increase hip PROM to 60 deg ER bilaterally, IR 20 deg bilaterally, in order to assist with more normalized gait pattern. - met  4. Patient  to increase B LE and core strength to 4-/5 in order to improve gait and balance.  - PC  5. Patient to increased right knee extn ROM to -5 deg for improved gait mechanics- PC  6. Patient   to have pain less than 2/10 at worst, to improve QOL. - PC  7. Patient  to improve score on the FOTO, to improve QOL.- PC  8. Patient  to be educated on postural/body mechanics awareness. - PC     Long Term Goals: In 8 weeks  1.Patient to improve score on the FOTO to 48 or better, to improve QOL. - PC  2. Patient to demo increase in LE strength to 4/5, in order to improve endurance and increase ability to ambulate for increased time. - PC  3. Patient to have decreased pain to 2/10 at worst, to improve QOL. - met  4. Patient to  demo increase lumbar ROM to ,  SB 30 deg, B Rot 90%in order to improve available range of motion for ADL's. - PC  .  5. Patient  to increase hip PROM to Extn +5 deg,  60 deg ER bilaterally, IR 20 deg bilaterally, in order to assist with more normalized gait pattern. - PC  6. Patient to increased right knee extn ROM to -5 deg for improved gait mechanics - PC  6. Patient to perform daily activities without increased symptoms including:  Prolonged walking x 10 minutes. - met  Ascending.descending 6 inch step without upper extremities assistance - PC  Return to gym and work outings 2 times per week with family transportation - met         Plan     Would like to extend treatment for 2 more months to allow the patient to return to an exercise regimen as she has had limited exercises over the last few weeks with the recent death of the patient's daughter.      Leoncio Lujan, PT

## 2024-03-21 NOTE — TELEPHONE ENCOUNTER
Per elizabeth Colunga:  I am following up on the previous message sent regarding a rollator for the patient. I spoke with the patient's granddaughter Ariane. Ariane informed me that pt was recently in ER d/t a fall. Ariane would like to see about getting a rollator as her walker is old and pt needs something stable to get around. Please reach out to Ariane with any updates. I can be reached via [employer] or number below. Thank you

## 2024-03-21 NOTE — PROGRESS NOTES
[facility]  Speech Therapy Progress Note- Neurological Rehabilitation  Date: 3/21/2024     Name: Leslie Wood   MRN: [id number]   Therapy Diagnosis:   Encounter Diagnosis   Name Primary?    Cognitive communication disorder Yes   Physician: Marily Millan NP  Physician Orders: Ambulatory Referral to Speech Therapy   Medical Diagnosis: Disorientation [R41.0], Aphasia [R47.01]     Visit #/ Visits Authorized: 8/ 12 (+eval, +5)  Date of Evaluation:  11/8/23  Insurance Authorization Period: 11/15/2023 - 12/31/2023   Plan of Care Expiration Date:    1/31/24  Extended Plan of Care:  2/21/24-5/1/24  Progress Note: 3/21/24     Time In:  9:05 AM  Time Out:  9:45 AM  Total Billable Time: 40 mins      Precautions: Standard, Fall, Cognition, and Communication  Subjective:   Patient reports: she has been doing well. No significant changes since last session.   She was compliant to home exercise program.     Response to previous treatment: good    Pain Scale: 7/10 on a Visual Analog Scale currently.  Pain Location: lower back  Objective:   TIMED  Procedure Min.   Cognitive Therapeutic Interventions, first 15 minutes CPT 79931  15   Cognitive Therapeutic Interventions, each additional 15 minutes CPT 52704  25           Short Term Goals: (6 weeks) Current Progress:   Patient will sustain attention to a simple task (auditory or visual) for 3 minutes with 90% accuracy or no more than 2 redirections.      Goal Met 12/13/2023   Auditory: met x2  Visual: met x7 Auditory Sustained Attention:  Several sustained auditory attention tasks completed in today's session. Patient listened to multi-step commands and followed with 91% accuracy. She required initial verbal cueing for use of strategies including taking requesting repetition as needed. Improved use of strategies independently with overall improved accuracy in today's session.    Visual Sustained Attention:  Patient completed several visual sustained attention tasks  in today's session in which she visually scanned for letters/numbers in a paragraph.   Trial 1: 1:30, 100% accuracy, 1/7 relative scale of cognitive load   Trial 2: 1:45, 100% accuracy, 1/7 relative scale of cognitive load   Trial 3: 1:30, 100% accuracy, 1/7 relative scale of cognitive load    Trial 4: 2:08, -7 (missed entire row of scanning)   Trial 5: 2:10, 100% accuracy, 1/7 relative scale of cognitive load   Trial 6: 1:30, 100% accuracy, 1/7 relative scale of cognitive load     Overall improved use of strategies to facilitate visual scanning pattern as well as good awareness of errors. Overall high accuracy of completion with low cognitive load.       2. Patient will complete selective attention tasks (auditory or visual) with 85% accuracy independently increase selective attention.                                                GOAL MET 3/18/2024 Auditory Selective Attention:  Patient listened for and followed complex 2 step commands in a dynamic environment with auditory and visual distractions during completion. She followed commands with 92% accuracy and initial verbal cueing for use of strategies. Discussion of how this relates to conversation and asking clarifying questions to facilitate attention and information processing. She rated task as 2/7 on relative scale of cognitive load.    She then listened to a 5 minute video about how basketballs are made. She requested to take notes as needed and paused/rewound video as needed. She answered questions about what she heard with 92% accuracy and rated task as 2/7 on relative scale of cognitive load.     Visual Selective Attention:  Patient participated in visual selective attention task of N-Back (level 1) pictures with natural background noise (therapy door remained opened.   Trial 1: Patient completed task with 86% accuracy given minimal cueing from clinician  Trial 2: Patient completed task with 88% accuracy independently.     GOAL MET 3/18/2024   3.  Patient will use attention shifting strategies to shift attention between two tasks (auditory or visual) with no more than 3 cues or 90% accuracy to improve alternating attention.          Progressing/ Not Met 3/21/2024  Auditory Alternating Attention:   Patient completed auditory alternating attention task of listing items in two categories in an alternating fashion (I.e. one state, one color, one state, one color).  She completed this task with consistent moderate cueing to recall previously stated items and to alternate between categories.     Visual Alternating Attention:   Patient completed visual alternating attention task of connecting icons of numbers and letters on a page in alternating fashion (1 - A - 2- B). She completed this task with 85% accuracy independently; 100% accuracy given minimal-moderate cueing.        4. Patient will recall functional information (name, birthday, address, etc) following delays of 2-3 minutes following a prompt questions across 3 therapy sessions with 80% accuracy (spaced retrieval).      Progressing/ Not Met 3/21/2024   Not formally addressed     5. Patient will complete a functional task to improve attention, memory and/or executive functions (I.e. sample bill paying activity, recipe, or multiple choice comprehension questions to 1 paragraphs) with 80% accuracy and natural environment noise distractions (TV news background, music, etc.).      Progressing/ Not Met 3/21/2024    Not formally addressed     6. Patient will be educated regarding cognitive deficits as applicable to the patient's life for increased awareness and will execute returned demonstration of information with 80% accuracy.       Goal Met 1/24/2024  Patient and her granddaughter again educated regarding nature of cognitive deficits including a variety of foundational cognitive elements related to R hemisphere dysfunction following CVA including the cognitive triangle (with emphasis on foundational versus  higher level cognitive functions, awareness of deficits, impact of attention, information processing, memory, and executive functioning deficits as each area relates to assessment findings), spoon theory to discuss cognitive versus physical versus emotional fatigue and management of each to more efficiently use energy daily for more or less cognitively demanding tasks, internal versus external distractions (including emotions, pain, stressors, etc) and management of each, as well as overall impact of these deficits on daily functioning. Patient demonstrated awareness of information provided as demonstrated by asking good questions and expressing understanding. Discussed specifically use of mindfulness and meditation/breathing strategies to improve cognitive functioning related to internal, specifically emotional stressors.       7. Patient will participate in continued cognitive assessment of right hemisphere dysfunction.     Goal Met 2023         GOAL MET 2023     Patient Education/Response:   Patient educated regarding the followin. Strategies for improved accuracy such a slowing down and re-checking work   2. How lack of sleep may impact attention/memory cognition.     Home program established: Patient instructed to continue prior program  Patient verbalized understanding to all above education provided.     See Electronic Medical Record under Patient Instructions for exercises provided throughout therapy.  Assessment:   PROGRESS:  Patient is progressing steadily towards goals. She has met goals for sustained and selective attention and is currently on track to meet alternating attention goal. She continues to present with difficulty decreased attention which impacts her ability to encode, store and recall information. However, she also continues to present with word finding difficulty that may be separate from cognitive-communication deficit. Word finding difficulty appreciated during  "confrontational naming tasks where the patient says "I know what that is" and she can describe the object but she cannot name it. Word finding deficits also appreciated in conversational tasks. However, attention deficits likely to impact overall conversational fluency. 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 I believe she would benefit from additional goals to target verbal expression. Will continue to assess.     Leslie is progressing well towards her goals. Current goals remain appropriate. Goals to be updated as necessary.     Patient prognosis is Fair. Patient will continue to benefit from skilled outpatient speech and language therapy to address the deficits listed in the problem list on initial evaluation, provide patient/family education and to maximize patient's level of independence in the home and community environment.     Medical necessity is demonstrated by the following IMPAIRMENTS:  Cognition: Deficits in executive functioning, attention, and memory prevent the pt from relaying medically and safety relevant information in a timely manner in a state of emergency.     Barriers to Therapy: NA  Patient's spiritual, cultural and educational needs considered and patient agreeable to plan of care and goals.  Plan:   Continue Plan of Care with focus on rehabilitation and compensation for attention, memory, and executive functioning deficits impacting safety and efficiency of daily task completion.     Bceca Blanco, CCC-SLP, CBIS   Speech Language Pathologist   Certified Brain Injury Specialist   3/21/2024     "

## 2024-03-28 ENCOUNTER — CLINICAL SUPPORT (OUTPATIENT)
Dept: REHABILITATION | Facility: HOSPITAL | Age: 84
End: 2024-03-28
Payer: MEDICARE

## 2024-03-28 DIAGNOSIS — R53.1 DECREASED STRENGTH, ENDURANCE, AND MOBILITY: ICD-10-CM

## 2024-03-28 DIAGNOSIS — R41.841 COGNITIVE COMMUNICATION DISORDER: Primary | ICD-10-CM

## 2024-03-28 DIAGNOSIS — R26.9 GAIT ABNORMALITY: ICD-10-CM

## 2024-03-28 DIAGNOSIS — M46.1 SACROILIITIS, NOT ELSEWHERE CLASSIFIED: Primary | ICD-10-CM

## 2024-03-28 DIAGNOSIS — Z74.09 DECREASED STRENGTH, ENDURANCE, AND MOBILITY: ICD-10-CM

## 2024-03-28 DIAGNOSIS — R68.89 DECREASED STRENGTH, ENDURANCE, AND MOBILITY: ICD-10-CM

## 2024-03-28 PROCEDURE — 97129 THER IVNTJ 1ST 15 MIN: CPT

## 2024-03-28 PROCEDURE — 97110 THERAPEUTIC EXERCISES: CPT

## 2024-03-28 PROCEDURE — 97530 THERAPEUTIC ACTIVITIES: CPT

## 2024-03-28 PROCEDURE — 97130 THER IVNTJ EA ADDL 15 MIN: CPT

## 2024-03-28 NOTE — PROGRESS NOTES
[facility]  Speech Therapy Progress Note- Neurological Rehabilitation  Date: 3/28/2024     Name: Leslie Wood   MRN: [id number]   Therapy Diagnosis:   Encounter Diagnosis   Name Primary?    Cognitive communication disorder Yes   Physician: Marily Millan NP  Physician Orders: Ambulatory Referral to Speech Therapy   Medical Diagnosis: Disorientation [R41.0], Aphasia [R47.01]     Visit #/ Visits Authorized: 9/ 12 (+eval, +5)  Date of Evaluation:  11/8/23  Insurance Authorization Period: 11/15/2023 - 12/31/2023   Plan of Care Expiration Date:    1/31/24  Extended Plan of Care:  2/21/24-5/1/24  Progress Note: 3/21/24     Time In:  9:05 AM  Time Out:  10:00 AM  Total Billable Time: 55 mins      Precautions: Standard, Fall, Cognition, and Communication  Subjective:   Patient reports: she has been doing well. No significant changes since last session.   She was compliant to home exercise program.     Response to previous treatment: good    Pain Scale: 8/10 on a Visual Analog Scale currently.  Pain Location: lower back  Objective:   TIMED  Procedure Min.   Cognitive Therapeutic Interventions, first 15 minutes CPT 88126  15   Cognitive Therapeutic Interventions, each additional 15 minutes CPT 32811  40           Short Term Goals: (6 weeks) Current Progress:   Patient will sustain attention to a simple task (auditory or visual) for 3 minutes with 90% accuracy or no more than 2 redirections.      Goal Met 12/13/2023   Auditory: met x2  Visual: met x7 Auditory Sustained Attention:  Several sustained auditory attention tasks completed in today's session. Patient listened to multi-step commands and followed with 91% accuracy. She required initial verbal cueing for use of strategies including taking requesting repetition as needed. Improved use of strategies independently with overall improved accuracy in today's session.    Visual Sustained Attention:  Patient completed several visual sustained attention tasks  in today's session in which she visually scanned for letters/numbers in a paragraph.   Trial 1: 1:30, 100% accuracy, 1/7 relative scale of cognitive load   Trial 2: 1:45, 100% accuracy, 1/7 relative scale of cognitive load   Trial 3: 1:30, 100% accuracy, 1/7 relative scale of cognitive load    Trial 4: 2:08, -7 (missed entire row of scanning)   Trial 5: 2:10, 100% accuracy, 1/7 relative scale of cognitive load   Trial 6: 1:30, 100% accuracy, 1/7 relative scale of cognitive load     Overall improved use of strategies to facilitate visual scanning pattern as well as good awareness of errors. Overall high accuracy of completion with low cognitive load.       2. Patient will complete selective attention tasks (auditory or visual) with 85% accuracy independently increase selective attention.                                                GOAL MET 3/18/2024 Auditory Selective Attention:  Patient listened for and followed complex 2 step commands in a dynamic environment with auditory and visual distractions during completion. She followed commands with 92% accuracy and initial verbal cueing for use of strategies. Discussion of how this relates to conversation and asking clarifying questions to facilitate attention and information processing. She rated task as 2/7 on relative scale of cognitive load.    She then listened to a 5 minute video about how basketballs are made. She requested to take notes as needed and paused/rewound video as needed. She answered questions about what she heard with 92% accuracy and rated task as 2/7 on relative scale of cognitive load.     Visual Selective Attention:  Patient participated in visual selective attention task of N-Back (level 1) pictures with natural background noise (therapy door remained opened.   Trial 1: Patient completed task with 86% accuracy given minimal cueing from clinician  Trial 2: Patient completed task with 88% accuracy independently.     GOAL MET 3/18/2024   3.  "Patient will use attention shifting strategies to shift attention between two tasks (auditory or visual) with no more than 3 cues or 90% accuracy to improve alternating attention.                    Progressing/ Not Met 3/28/2024  Auditory Alternating Attention:   Patient completed auditory alternating attention task of following one step directions and answering questions regarding 1-2 sentence "stories." She alternated between the two tasks every 30-60 seconds. She completed task one with 70% accuracy. She completed task 2 with 72% accuracy.         Visual Alternating Attention:   Patient completed auditory alternating attention task (Finding Alternating Symbols: Constant Therapy: Level 1). She completed this task with 80% accuracy given moderate cueing.    4. Patient will recall functional information (name, birthday, address, etc) following delays of 2-3 minutes following a prompt questions across 3 therapy sessions with 80% accuracy (spaced retrieval).      Progressing/ Not Met 3/28/2024   Not formally addressed     5. Patient will complete a functional task to improve attention, memory and/or executive functions (I.e. sample bill paying activity, recipe, or multiple choice comprehension questions to 1 paragraphs) with 80% accuracy and natural environment noise distractions (TV news background, music, etc.).      Progressing/ Not Met 3/28/2024    Not formally addressed     6. Patient will be educated regarding cognitive deficits as applicable to the patient's life for increased awareness and will execute returned demonstration of information with 80% accuracy.       Goal Met 1/24/2024  Patient and her granddaughter again educated regarding nature of cognitive deficits including a variety of foundational cognitive elements related to R hemisphere dysfunction following CVA including the cognitive triangle (with emphasis on foundational versus higher level cognitive functions, awareness of deficits, impact of " attention, information processing, memory, and executive functioning deficits as each area relates to assessment findings), spoon theory to discuss cognitive versus physical versus emotional fatigue and management of each to more efficiently use energy daily for more or less cognitively demanding tasks, internal versus external distractions (including emotions, pain, stressors, etc) and management of each, as well as overall impact of these deficits on daily functioning. Patient demonstrated awareness of information provided as demonstrated by asking good questions and expressing understanding. Discussed specifically use of mindfulness and meditation/breathing strategies to improve cognitive functioning related to internal, specifically emotional stressors.       7. Patient will participate in continued cognitive assessment of right hemisphere dysfunction.     Goal Met 2023         GOAL MET 2023     Patient Education/Response:   Patient educated regarding the followin. Strategies for improved accuracy such a slowing down and re-checking work   2. Asking for repetition to improve accuracy     Home program established: Patient instructed to continue prior program  Patient verbalized understanding to all above education provided.     See Electronic Medical Record under Patient Instructions for exercises provided throughout therapy.  Assessment:   Patient is progressing steadily towards goals. She has met goals for sustained and selective attention. She continues to present with difficulty decreased attention which impacts her ability to encode, store and recall information. However, she also continues to present with word finding difficulty that may be separate from cognitive-communication deficit.  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 I believe she would benefit from additional goals to target verbal  expression. Will continue to assess.     Leslie is progressing well towards her goals. Current goals remain appropriate. Goals to be updated as necessary.     Patient prognosis is Fair. Patient will continue to benefit from skilled outpatient speech and language therapy to address the deficits listed in the problem list on initial evaluation, provide patient/family education and to maximize patient's level of independence in the home and community environment.     Medical necessity is demonstrated by the following IMPAIRMENTS:  Cognition: Deficits in executive functioning, attention, and memory prevent the pt from relaying medically and safety relevant information in a timely manner in a state of emergency.     Barriers to Therapy: NA  Patient's spiritual, cultural and educational needs considered and patient agreeable to plan of care and goals.  Plan:   Continue Plan of Care with focus on rehabilitation and compensation for attention, memory, and executive functioning deficits impacting safety and efficiency of daily task completion.     Becca Blanco, CCC-SLP, CBIS   Speech Language Pathologist   Certified Brain Injury Specialist   3/28/2024

## 2024-03-29 NOTE — PROGRESS NOTES
[facility]   Physical Therapy Treatment Note        Name: Leslie CASTELLON [facility] Number: [id number]    Therapy Diagnosis:   Encounter Diagnoses   Name Primary?    Sacroiliitis, not elsewhere classified Yes    Decreased strength, endurance, and mobility     Gait abnormality        Physician: Marily Millan NP    Visit Date: 3/28/2024    Physician Orders: PT Eval and Treat   Medical Diagnosis from Referral:   Late effect of cerebrovascular accident (CVA)   Hemiplegia and hemiparesis following cerebral infarction affecting left non-dominant side   Gait difficulty   Evaluation Date: 11/16/2023  Authorization Period Expiration: 10/16/2024  Plan of Care Expiration: 4/19/2024  Progress Note Due: 4/19/2024  Visit # / Visits authorized: 4/12 (+eval)  FOTO: 1/3      Precautions: Standard and Fall (1/8/24 - patient reports history of 3 back fusion surgeries)  PTA Visit #: 0/5     Time In: 1000  Time Out: 1100  Total Billable Time: 60 minutes   (Billing reflects 1 on 1 treatment time spent with patient)      Subjective     Patient reports:that she experiences mild lower back pain today.    He/She was partially compliant with home exercise program.  Response to previous treatment: some pain relief  Functional change: Some increased speed with walking    Pain: 3/10     Location: lumbar paraspinal region    Objective      Objective Measures updated at progress report or POC update only unless otherwise noted.     Lumbar Spine ROM: %, BB 75% pain lumbar, R %, L %, R Rot 50% pain right lumbar, L Rot 66%     Palpation: Trigger points on palpation of the lumbar paraspinal musculature    Treatment     Leslie received the treatments listed below:     THERAPEUTIC EXERCISES to develop strength, endurance, ROM, flexibility, posture, and core stabilization for (30) minutes including:    Intervention Performed Today    Nustep    15 minutes, L3   Bicycle  x X 8m   Lower trunk rotations x 10 second  "holds x 10   Pelvic tilts X Anterior/posterior with cueing- 10 second holds x 2 min   Sitting flexion with swiss ball  10x   Isometric adduction with ball   3  min   Abdominal brace x With single knee to chest and slowly lowering - 2x10   Supine x Active straight leg raise - 2x10   Long Arc Quads  3# ankle weight - 3x15       Leslie participated in neuromuscular re-education activities to improve: Balance, Coordination, Proprioception, and Posture for 30 minutes. The following activities were included:      Intervention 12/12/2023  Parameters   Sit to stand off mat from mat  10x/ 2 sets hands together   Prone hip extension x 10x with cues   Sidelying hip series  2x10 abduction  10x/ 2 sets clamshell   Bridging x 10 second holds x 2m   Step Ups x 4 inch box   Standing X  X  X Hip Extension - 2x10  Hip Abduction - 2x10  Marching - 2x10   Tandem stance x Hand Touches - 2x10   Matrix  x Knee Extension - 2x10 - starting at 60-70 deg knee flexion -  no plates, 1 bowling pin        Plan for Next Visit: Progress as indicated        MANUAL THERAPY TECHNIQUES were applied for (0) minutes, including:    Manual Intervention Performed Today    Soft Tissue Mobilization   STM bilateral multifidus and gluteal/ piriformis,   Joint Mobilizations  PA glides lumbosacral   Mobilization with movement     Muscle energy techniques   "Shotgun" for + SI provocation   Functional Dry Needling   FDN performed to the bilateral lumbosacral paraspinal/multifidus     Plan for Next Visit:         Patient Education and Home Exercises       Home Exercises Provided and Patient Education Provided     Education provided: (5) minutes  Moist heat pack to low back prior to exercises at home.    Written Home Exercises Provided: yes.  Exercises were reviewed and Leslie was able to demonstrate them prior to the end of the session.  Leslie demonstrated fair  understanding of the education provided. See EMR under Patient Instructions for exercises provided during " therapy sessions.    Assessment     Patient reports that knee pain remains decreased. Continued to work on strengthening and stabilization exercises while pain is decreased.  Still would like the patient to progress into more community exercise.    Leslie is progressing well towards her goals.   Patient prognosis is Fair.     Patient will continue to benefit from skilled outpatient physical therapy to address the deficits listed in the problem list box on initial evaluation, provide pt/family education and to maximize patient's level of independence in the home and community environment.     Patient's spiritual, cultural and educational needs considered and pt agreeable to plan of care and goals.     Anticipated Barriers for therapy: Anxiety or Panic Disorders, Arthritis, Back pain, Depression, Headaches, High  Blood Pressure, Kidney, Bladder, Prostate or Urination Problems, Osteoporosis, Prior Surgery, Stroke or TIA, Visual Impairment      Goals: Reviewed:3/28/2024       Short Term Goals: In 4 weeks   1.Patient to be educated on HEP. - met  2. Patient  to increase lumbar spine ROM to B SB 30 deg, B Rot 75% - PC  3. Patient  to increase hip PROM to 60 deg ER bilaterally, IR 20 deg bilaterally, in order to assist with more normalized gait pattern. - met  4. Patient  to increase B LE and core strength to 4-/5 in order to improve gait and balance.  - PC  5. Patient to increased right knee extn ROM to -5 deg for improved gait mechanics- PC  6. Patient   to have pain less than 2/10 at worst, to improve QOL. - PC  7. Patient  to improve score on the FOTO, to improve QOL.- PC  8. Patient  to be educated on postural/body mechanics awareness. - PC     Long Term Goals: In 8 weeks  1.Patient to improve score on the FOTO to 48 or better, to improve QOL. - PC  2. Patient to demo increase in LE strength to 4/5, in order to improve endurance and increase ability to ambulate for increased time. - PC  3. Patient to have decreased pain  to 2/10 at worst, to improve QOL. - met  4. Patient to demo increase lumbar ROM to ,  SB 30 deg, B Rot 90%in order to improve available range of motion for ADL's. - PC  .  5. Patient  to increase hip PROM to Extn +5 deg,  60 deg ER bilaterally, IR 20 deg bilaterally, in order to assist with more normalized gait pattern. - PC  6. Patient to increased right knee extn ROM to -5 deg for improved gait mechanics - PC  6. Patient to perform daily activities without increased symptoms including:  Prolonged walking x 10 minutes. - met  Ascending.descending 6 inch step without upper extremities assistance - PC  Return to gym and work outings 2 times per week with family transportation - met         Plan     Would like to extend treatment for 2 more months to allow the patient to return to an exercise regimen as she has had limited exercises over the last few weeks with the recent death of the patient's daughter.      Leoncio Lujan, PT

## 2024-03-31 ENCOUNTER — EXTERNAL CHRONIC CARE MANAGEMENT (OUTPATIENT)
Dept: PRIMARY CARE CLINIC | Facility: CLINIC | Age: 84
DRG: 690 | End: 2024-03-31
Payer: MEDICARE

## 2024-03-31 PROCEDURE — 99439 CHRNC CARE MGMT STAF EA ADDL: CPT | Mod: S$PBB,,, | Performed by: FAMILY MEDICINE

## 2024-03-31 PROCEDURE — 99490 CHRNC CARE MGMT STAFF 1ST 20: CPT | Mod: PBBFAC | Performed by: FAMILY MEDICINE

## 2024-03-31 PROCEDURE — 99490 CHRNC CARE MGMT STAFF 1ST 20: CPT | Mod: S$PBB,,, | Performed by: FAMILY MEDICINE

## 2024-03-31 PROCEDURE — 99439 CHRNC CARE MGMT STAF EA ADDL: CPT | Mod: PBBFAC | Performed by: FAMILY MEDICINE

## 2024-04-01 ENCOUNTER — CLINICAL SUPPORT (OUTPATIENT)
Dept: REHABILITATION | Facility: HOSPITAL | Age: 84
End: 2024-04-01
Payer: MEDICARE

## 2024-04-01 DIAGNOSIS — Z74.09 DECREASED STRENGTH, ENDURANCE, AND MOBILITY: ICD-10-CM

## 2024-04-01 DIAGNOSIS — R29.898 DECREASED GRIP STRENGTH OF LEFT HAND: ICD-10-CM

## 2024-04-01 DIAGNOSIS — I69.30 LATE EFFECT OF CEREBROVASCULAR ACCIDENT (CVA): Primary | ICD-10-CM

## 2024-04-01 DIAGNOSIS — Z78.9 DECREASED ACTIVITIES OF DAILY LIVING (ADL): ICD-10-CM

## 2024-04-01 DIAGNOSIS — R29.898 FINE MOTOR IMPAIRMENT: ICD-10-CM

## 2024-04-01 DIAGNOSIS — I69.354 HEMIPLEGIA AND HEMIPARESIS FOLLOWING CEREBRAL INFARCTION AFFECTING LEFT NON-DOMINANT SIDE: ICD-10-CM

## 2024-04-01 DIAGNOSIS — R29.818 FINE MOTOR IMPAIRMENT: ICD-10-CM

## 2024-04-01 DIAGNOSIS — R41.841 COGNITIVE COMMUNICATION DISORDER: Primary | ICD-10-CM

## 2024-04-01 DIAGNOSIS — R53.1 DECREASED STRENGTH, ENDURANCE, AND MOBILITY: ICD-10-CM

## 2024-04-01 DIAGNOSIS — R68.89 DECREASED STRENGTH, ENDURANCE, AND MOBILITY: ICD-10-CM

## 2024-04-01 PROCEDURE — 97129 THER IVNTJ 1ST 15 MIN: CPT

## 2024-04-01 PROCEDURE — 97110 THERAPEUTIC EXERCISES: CPT

## 2024-04-01 PROCEDURE — 97112 NEUROMUSCULAR REEDUCATION: CPT

## 2024-04-01 PROCEDURE — 97530 THERAPEUTIC ACTIVITIES: CPT

## 2024-04-01 PROCEDURE — 97130 THER IVNTJ EA ADDL 15 MIN: CPT

## 2024-04-01 NOTE — PROGRESS NOTES
[facility]  Occupational Therapy Progress and Treatment Note     Date: 4/1/2024  Name: Leslie CASTELLON [facility] Number: [id number]    Therapy Diagnosis:     Encounter Diagnoses   Name Primary?    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     Decreased  strength of left hand     Decreased strength, endurance, and mobility     Decreased activities of daily living (ADL)     Fine motor impairment        Physician: Marily Millan NP    Physician Orders: Occupational Therapy to Evaluate and Treat   Medical Diagnosis: R29.898 (ICD-10-CM) - Poor fine motor skills  Surgical Procedure and Date: N/A     Evaluation Date: 11/8/2023  Insurance Authorization Period Expiration: 11/8/2023 - 12/31/2023  Plan of Care Certification Period: 11/8/2023 - 02/08/2024  Updated Plan of Care Certification Period: 02/08/2024 - 05/21/2024  Progress Note Due: 4/21/2024     Date of Return to MD: N/A     Visit # / Visits authorized: 6/25 (11 Episode Visits)  FOTO: 1/3     Precautions:  Standard    Time In: 11:15  Time Out: 11:55  Total Treatment Time: 40 minutes  Total Billable Time: 40 minutes    Subjective     Patient reports: She is doing okay. She has been staying with her granddaughter     she was compliant with home exercise program given last session.   Response to previous treatment: Tolerated well  Functional change: Improved knowledge of HEP    Pain: 4/10  Location: bilateral hands     Objective       Joint Evaluation  AROM  2/21/2024 AROM  2/21/2024 PROM   2/21/2024 AROM  4/1/2024 Goal  Right     Left Right Right Right Active    Shoulder Flexion 0-180 Within normal limits  80 105 87 110   Shoulder Abduction 0-180  80 105 86 110   Shoulder External Rotation 0-90  25 40      Shoulder Internal Rotation 0-90  45 60      Shoulder Extension 0-60  10 25      Shoulder Horizontal Adduction 0-90  Within normal limits  Within  normal limits       Elbow Flexion 0-150         Elbow Extension 0         Wrist Flexion 0-80         Wrist Extension 0-70         Wrist Supination 0-80         Wrist Pronation 0-80         Wrist Ulnar Deviation 0-30         Wrist Radial Deviation 0-20            Fist: normal        Strength 2/21/2024 2/21/2024 4/1/2024 4/1/2024     **within available ROM** Left Right Left Right Goal Left   Shoulder Flexion 3+/5 3-/5 3+/5 3-/5 4/5   Shoulder Abduction 3+/5 3-/5   4/5   Shoulder External Rotation  3+/5 3-/5   4/5   Shoulder Internal Rotation 3+/5 3-/5   4/5   Shoulder Extension 3+/5 3-/5   4/5   Shoulder Horizontal Adduction 3+/5 3-/5   4/5   Elbow Flexion 3+/5 3+/5 4-/5 4-/5 4/5   Elbow Extension 3+/5 3+/5   4/5   Wrist Flexion 3+/5 3+/5   4/5   Wrist Extension 3+/5 3+/5   4/5   Supination 3+/5 3+/5   4/5   Pronation 3+/5 3+/5   4/5   Ulnar Deviation 3+/5 3+/5   4/5   Radial Deviation 3+/5 3+/5   4/5       Strength: (MUNDO Dynamometer in lbs.) Average 3 trials, Position II:       2/21/2024 2/21/2024 4/1/2024 Goal     Left Right Left Left   Rung II 25# 30# 27# 30#        Treatment     Supervised Modalities: Modalities such as hot/cold packs, traction, unattended electrical stimulation, paraffin bath, fluidotherapy to reduce pain and increase soft tissue extensibility. Leslie received (0) minutes of modalities listed below after being cleared for contraindications.  x = modalities performed     Supervised Modalities 4/1/2024                            Manual Therapy Techniques: Joint mobilizations, Soft tissue Mobilization, and mobilization with movement applied to the area listed below. Leslie received (0) minutes of interventions. x = intervention performed today    Manual Intervention 4/1/2024    Soft Tissue Mobilization     Joint Mobilizations     Mobilization with movement              Therapeutic Exercises: to develop strength, endurance, ROM, flexibility, posture and core stabilization. Leslie received (15)  minutes of exercises listed below. x = performed today * = level of progression of activity     Therapeutic Exercise 4/1/2024    Interossei  -green foam  -rubber bands x 20x bilateral    Digi flexion - red x 20x bilateral    Resistive pinch - red  -2pt  -3pt  -lateral x 20x bilateral    Thumb palmar   -adduction  -abduction x 20x bilateral    Thumb radial  -adduction  -abduction x 20x bilateral    Forearm exercise  - wrist flexion  -wrist extension  - supination  -pronation x 2lb        Therapeutic Activities: Everyday tasks to address the combined need of improved strength, range of motion, and endurance to achieve increased independence. While simulating these activities, the person is applying the gained skills of strength and improved range of motion in a practical way.  Leslie received (10) minutes of activities listed below. x = activities performed today * = level of progression of activity     Therapeutic Activities 4/1/2024    Velcro board  -lateral pinch  - dowel  x 20x bilateral    Opening and closing containers x 5 minutes, ergonomics               Neuromuscular Re-education: the use of functional strengthening, stretching, balancing and coordination activities that train the nerves and muscles to react and communicate to restore normal body movement patterns to increase self care independence. Leslie received (15) minutes of interventions listed below.  x = interventions performed today * = level of progression of activity     Neuromuscular Re-education 4/1/2024    Finger opposition  x 20x   Finger taps (extension) x 20x   Finger opposition with slides x    Table Top active assist range of motion  - shoulder flexion  - shoulder abduction  x 5x - 15 second holds     Self Care: Fundamental skills required to independently care for oneself. Activities of daily living such as eating, bathing, dressing, toileting, grooming, sleeping, transfers and mobility. Instrumental activities of daily living such as  driving, medication management, pet care, home management/cleaning, financial management, using the phone, meal preparation and shopping. Leslie received (0) minutes of interventions listed below.  x = interventions performed * = level of progression of activity     Self Care 4/1/2024                            Home Exercises and Education Provided     Education provided:   - home exercise program education provided  - progress towards goals     Written Home Exercises Provided: Patient instructed to cont prior HEP.  Exercises were reviewed and Leslie was able to demonstrate them prior to the end of the session. Leslie demonstrated good understanding of the home exercise program provided.     See EMR under Patient Instructions for exercises provided prior visit.        Assessment     Patient tolerated exercises well. Needs verbal cues for hand placement.     Leslie is progressing towards her goals and there are no updates to goals at this time. Patient prognosis is Good.     Patient will continue to benefit from skilled outpatient occupational therapy to address the deficits listed in the problem list on initial evaluation provide patient/family education and to maximize patient's level of independence in the home and community environment.     Patient's spiritual, cultural and educational needs considered and patient agreeable to plan of care and goals.    Anticipated barriers to occupational therapy: Cognition, home exercise program compliance    Goals:     Short Term Goals:  6 weeks  Progress    Pain: Patient will demonstrate improved pain by reports of less than or equal to 7/10 worst pain on the verbal rating scale in order to progress toward maximal functional ability and improve quality of life. PC   Function: Patient will demonstrate improved function as indicated by a functional limitation score of less than or equal to 46 out of 100 on FOTO. PC   Mobility: Patient will improve active range of motion to 50% of  stated goals, listed in objective measures above, in order to progress towards independence with functional activities.  PC   Strength: Patient will improve strength to 50% of stated goals, listed in objective measures above, in order to progress towards independence with functional activities.  PC   HEP: Patient will demonstrate independence with home exercise program in order to progress toward functional independence. PC      Long Term Goals:  12 weeks  Progress   Pain: Patient will demonstrate improved pain by reports of less than or equal to 6/10 worst pain on the verbal rating scale in order to progress toward maximal functional ability and improve quality of life.   PC   Function: Patient will demonstrate improved function as indicated by a functional limitation score of less than or equal to 48 out of 100 on FOTO. PC   Mobility: Patient will improve active range of motion to stated goals, listed in objective measures above, in order to return to maximal functional potential and improve quality of life. PC   Strength: Patient will improve strength to stated goals, listed in objective measures above, in order to improve functional independence and quality of life. PC   Patient will return to normal ADL's, IADL's, community involvement, recreational activities, and work-related activities with less than or equal to 5/10 pain and maximal function.  PC      Goals Key:  PC= Progressing/Continue;       PM= Partially Met;       GM= Goal Met,      DC= Discontinue    Plan     Continue Plan of Care (POC) and progress per patient tolerance.      Ivana Gallo, OT  ,OTD, OTR/L

## 2024-04-01 NOTE — PROGRESS NOTES
[facility]  Speech Therapy Progress Note- Neurological Rehabilitation  Date: 4/1/2024     Name: Leslie Wood   MRN: [id number]   Therapy Diagnosis:   Encounter Diagnosis   Name Primary?    Cognitive communication disorder Yes   Physician: Marily Millan NP  Physician Orders: Ambulatory Referral to Speech Therapy   Medical Diagnosis: Disorientation [R41.0], Aphasia [R47.01]     Visit #/ Visits Authorized: 10/ 12 (+eval, +5)  Date of Evaluation:  11/8/23  Insurance Authorization Period: 11/15/2023 - 12/31/2023   Plan of Care Expiration Date:    1/31/24  Extended Plan of Care:  2/21/24-5/1/24  Progress Note: 3/21/24     Time In:  10:41 AM  Time Out:  11:19 AM  Total Billable Time: 40 mins      Precautions: Standard, Fall, Cognition, and Communication  Subjective:   Patient reports: she has been doing well. No significant changes since last session.   She was compliant to home exercise program.     Response to previous treatment: good    Pain Scale: 8/10 on a Visual Analog Scale currently.  Pain Location: lower back  Objective:   TIMED  Procedure Min.   Cognitive Therapeutic Interventions, first 15 minutes CPT 85065  15   Cognitive Therapeutic Interventions, each additional 15 minutes CPT 04593  25           Short Term Goals: (6 weeks) Current Progress:   Patient will sustain attention to a simple task (auditory or visual) for 3 minutes with 90% accuracy or no more than 2 redirections.      Goal Met 12/13/2023   Auditory: met x2  Visual: met x7 Auditory Sustained Attention:  Several sustained auditory attention tasks completed in today's session. Patient listened to multi-step commands and followed with 91% accuracy. She required initial verbal cueing for use of strategies including taking requesting repetition as needed. Improved use of strategies independently with overall improved accuracy in today's session.    Visual Sustained Attention:  Patient completed several visual sustained attention tasks  in today's session in which she visually scanned for letters/numbers in a paragraph.   Trial 1: 1:30, 100% accuracy, 1/7 relative scale of cognitive load   Trial 2: 1:45, 100% accuracy, 1/7 relative scale of cognitive load   Trial 3: 1:30, 100% accuracy, 1/7 relative scale of cognitive load    Trial 4: 2:08, -7 (missed entire row of scanning)   Trial 5: 2:10, 100% accuracy, 1/7 relative scale of cognitive load   Trial 6: 1:30, 100% accuracy, 1/7 relative scale of cognitive load     Overall improved use of strategies to facilitate visual scanning pattern as well as good awareness of errors. Overall high accuracy of completion with low cognitive load.       2. Patient will complete selective attention tasks (auditory or visual) with 85% accuracy independently increase selective attention.                                                GOAL MET 3/18/2024 Auditory Selective Attention:  Patient listened for and followed complex 2 step commands in a dynamic environment with auditory and visual distractions during completion. She followed commands with 92% accuracy and initial verbal cueing for use of strategies. Discussion of how this relates to conversation and asking clarifying questions to facilitate attention and information processing. She rated task as 2/7 on relative scale of cognitive load.    She then listened to a 5 minute video about how basketballs are made. She requested to take notes as needed and paused/rewound video as needed. She answered questions about what she heard with 92% accuracy and rated task as 2/7 on relative scale of cognitive load.     Visual Selective Attention:  Patient participated in visual selective attention task of N-Back (level 1) pictures with natural background noise (therapy door remained opened.   Trial 1: Patient completed task with 86% accuracy given minimal cueing from clinician  Trial 2: Patient completed task with 88% accuracy independently.     GOAL MET 3/18/2024   3.  Patient will use attention shifting strategies to shift attention between two tasks (auditory or visual) with no more than 3 cues or 90% accuracy to improve alternating attention.                      Progressing/ Not Met 4/1/2024  Auditory Alternating Attention:   Patient completed auditory alternating attention task of listing items in two categories in an alternating fashion (one animal, one state, one animal, one state). She completed this task with 100% accuracy given consistent moderate cueing to recall items already listed. She was able to recall which category she was supposed to name.       Visual Alternating Attention:   Patient completed auditory alternating attention task (Finding Alternating Symbols: Constant Therapy: Level 1). She completed this task with 95% accuracy given moderate cueing. She rated this task a 2/7 on the relative scale of cognitive load.    4. Patient will recall functional information (name, birthday, address, etc) following delays of 2-3 minutes following a prompt questions across 3 therapy sessions with 80% accuracy (spaced retrieval).      Progressing/ Not Met 4/1/2024   Not formally addressed     5. Patient will complete a functional task to improve attention, memory and/or executive functions (I.e. sample bill paying activity, recipe, or multiple choice comprehension questions to 1 paragraphs) with 80% accuracy and natural environment noise distractions (TV news background, music, etc.).      Progressing/ Not Met 4/1/2024    Not formally addressed     6. Patient will be educated regarding cognitive deficits as applicable to the patient's life for increased awareness and will execute returned demonstration of information with 80% accuracy.       Goal Met 1/24/2024  Patient and her granddaughter again educated regarding nature of cognitive deficits including a variety of foundational cognitive elements related to R hemisphere dysfunction following CVA including the cognitive  triangle (with emphasis on foundational versus higher level cognitive functions, awareness of deficits, impact of attention, information processing, memory, and executive functioning deficits as each area relates to assessment findings), spoon theory to discuss cognitive versus physical versus emotional fatigue and management of each to more efficiently use energy daily for more or less cognitively demanding tasks, internal versus external distractions (including emotions, pain, stressors, etc) and management of each, as well as overall impact of these deficits on daily functioning. Patient demonstrated awareness of information provided as demonstrated by asking good questions and expressing understanding. Discussed specifically use of mindfulness and meditation/breathing strategies to improve cognitive functioning related to internal, specifically emotional stressors.       7. Patient will participate in continued cognitive assessment of right hemisphere dysfunction.     Goal Met 2023         GOAL MET 2023     Patient Education/Response:   Patient educated regarding the followin. Strategies for improved accuracy such a slowing down and re-checking work   2. Asking for repetition to improve accuracy     Home program established: Patient instructed to continue prior program  Patient verbalized understanding to all above education provided.     See Electronic Medical Record under Patient Instructions for exercises provided throughout therapy.  Assessment:   Patient is progressing steadily towards goals. Improvement with visual alternating attention today. She also continues to present with word finding difficulty that may be separate from cognitive-communication deficit.  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 I believe she would benefit from additional goals to target verbal expression. Will continue to  assess.     Leslie is progressing well towards her goals. Current goals remain appropriate. Goals to be updated as necessary.     Patient prognosis is Fair. Patient will continue to benefit from skilled outpatient speech and language therapy to address the deficits listed in the problem list on initial evaluation, provide patient/family education and to maximize patient's level of independence in the home and community environment.     Medical necessity is demonstrated by the following IMPAIRMENTS:  Cognition: Deficits in executive functioning, attention, and memory prevent the pt from relaying medically and safety relevant information in a timely manner in a state of emergency.     Barriers to Therapy: NA  Patient's spiritual, cultural and educational needs considered and patient agreeable to plan of care and goals.  Plan:   Continue Plan of Care with focus on rehabilitation and compensation for attention, memory, and executive functioning deficits impacting safety and efficiency of daily task completion.     Becca Blanco, CCC-SLP, CBIS   Speech Language Pathologist   Certified Brain Injury Specialist   4/1/2024

## 2024-04-03 ENCOUNTER — HOSPITAL ENCOUNTER (INPATIENT)
Facility: HOSPITAL | Age: 84
LOS: 1 days | Discharge: HOME | DRG: 690 | End: 2024-04-04
Attending: STUDENT IN AN ORGANIZED HEALTH CARE EDUCATION/TRAINING PROGRAM | Admitting: HOSPITALIST
Payer: MEDICARE

## 2024-04-03 DIAGNOSIS — M54.9 BACK PAIN, UNSPECIFIED BACK LOCATION, UNSPECIFIED BACK PAIN LATERALITY, UNSPECIFIED CHRONICITY: Primary | ICD-10-CM

## 2024-04-03 DIAGNOSIS — R07.9 CHEST PAIN, UNSPECIFIED TYPE: ICD-10-CM

## 2024-04-03 DIAGNOSIS — R07.9 CHEST PAIN: ICD-10-CM

## 2024-04-03 DIAGNOSIS — N30.00 ACUTE CYSTITIS WITHOUT HEMATURIA: ICD-10-CM

## 2024-04-03 LAB
ABO + RH BLD: NORMAL
ALBUMIN SERPL BCP-MCNC: 3.9 G/DL (ref 3.5–5.2)
ALP SERPL-CCNC: 91 U/L (ref 55–135)
ALT SERPL W/O P-5'-P-CCNC: 19 U/L (ref 10–44)
ANION GAP SERPL CALC-SCNC: 12 MMOL/L (ref 8–16)
ANION GAP SERPL CALC-SCNC: 14 MMOL/L (ref 8–16)
AST SERPL-CCNC: 27 U/L (ref 10–40)
BACTERIA #/AREA URNS HPF: ABNORMAL /HPF
BASOPHILS # BLD AUTO: 0.05 K/UL (ref 0–0.2)
BASOPHILS # BLD AUTO: 0.06 K/UL (ref 0–0.2)
BASOPHILS NFR BLD: 0.5 % (ref 0–1.9)
BASOPHILS NFR BLD: 0.6 % (ref 0–1.9)
BILIRUB SERPL-MCNC: 0.6 MG/DL (ref 0.1–1)
BILIRUB UR QL STRIP: NEGATIVE
BLD GP AB SCN CELLS X3 SERPL QL: NORMAL
BNP SERPL-MCNC: 37 PG/ML (ref 0–99)
BUN SERPL-MCNC: 36 MG/DL (ref 8–23)
BUN SERPL-MCNC: 38 MG/DL (ref 8–23)
CALCIUM SERPL-MCNC: 9.9 MG/DL (ref 8.7–10.5)
CALCIUM SERPL-MCNC: 9.9 MG/DL (ref 8.7–10.5)
CHLORIDE SERPL-SCNC: 105 MMOL/L (ref 95–110)
CHLORIDE SERPL-SCNC: 107 MMOL/L (ref 95–110)
CLARITY UR: CLEAR
CO2 SERPL-SCNC: 22 MMOL/L (ref 23–29)
CO2 SERPL-SCNC: 24 MMOL/L (ref 23–29)
COLOR UR: YELLOW
CREAT SERPL-MCNC: 1.2 MG/DL (ref 0.5–1.4)
CREAT SERPL-MCNC: 1.3 MG/DL (ref 0.5–1.4)
DIFFERENTIAL METHOD BLD: ABNORMAL
DIFFERENTIAL METHOD BLD: ABNORMAL
EOSINOPHIL # BLD AUTO: 0.2 K/UL (ref 0–0.5)
EOSINOPHIL # BLD AUTO: 0.2 K/UL (ref 0–0.5)
EOSINOPHIL NFR BLD: 1.6 % (ref 0–8)
EOSINOPHIL NFR BLD: 2 % (ref 0–8)
ERYTHROCYTE [DISTWIDTH] IN BLOOD BY AUTOMATED COUNT: 12.6 % (ref 11.5–14.5)
ERYTHROCYTE [DISTWIDTH] IN BLOOD BY AUTOMATED COUNT: 12.6 % (ref 11.5–14.5)
EST. GFR  (NO RACE VARIABLE): 40 ML/MIN/1.73 M^2
EST. GFR  (NO RACE VARIABLE): 44 ML/MIN/1.73 M^2
GLUCOSE SERPL-MCNC: 111 MG/DL (ref 70–110)
GLUCOSE SERPL-MCNC: 146 MG/DL (ref 70–110)
GLUCOSE UR QL STRIP: NEGATIVE
HCT VFR BLD AUTO: 46.1 % (ref 37–48.5)
HCT VFR BLD AUTO: 46.5 % (ref 37–48.5)
HGB BLD-MCNC: 14.7 G/DL (ref 12–16)
HGB BLD-MCNC: 14.7 G/DL (ref 12–16)
HGB UR QL STRIP: NEGATIVE
HYALINE CASTS #/AREA URNS LPF: 26 /LPF
IMM GRANULOCYTES # BLD AUTO: 0.03 K/UL (ref 0–0.04)
IMM GRANULOCYTES # BLD AUTO: 0.05 K/UL (ref 0–0.04)
IMM GRANULOCYTES NFR BLD AUTO: 0.4 % (ref 0–0.5)
IMM GRANULOCYTES NFR BLD AUTO: 0.5 % (ref 0–0.5)
KETONES UR QL STRIP: NEGATIVE
LACTATE SERPL-SCNC: 0.9 MMOL/L (ref 0.5–2.2)
LACTATE SERPL-SCNC: 1.5 MMOL/L (ref 0.5–2.2)
LEUKOCYTE ESTERASE UR QL STRIP: ABNORMAL
LYMPHOCYTES # BLD AUTO: 2.2 K/UL (ref 1–4.8)
LYMPHOCYTES # BLD AUTO: 3.1 K/UL (ref 1–4.8)
LYMPHOCYTES NFR BLD: 27.5 % (ref 18–48)
LYMPHOCYTES NFR BLD: 28.2 % (ref 18–48)
MCH RBC QN AUTO: 29.8 PG (ref 27–31)
MCH RBC QN AUTO: 30.1 PG (ref 27–31)
MCHC RBC AUTO-ENTMCNC: 31.6 G/DL (ref 32–36)
MCHC RBC AUTO-ENTMCNC: 31.9 G/DL (ref 32–36)
MCV RBC AUTO: 94 FL (ref 82–98)
MCV RBC AUTO: 94 FL (ref 82–98)
MICROSCOPIC COMMENT: ABNORMAL
MONOCYTES # BLD AUTO: 0.8 K/UL (ref 0.3–1)
MONOCYTES # BLD AUTO: 1.4 K/UL (ref 0.3–1)
MONOCYTES NFR BLD: 10.1 % (ref 4–15)
MONOCYTES NFR BLD: 12.8 % (ref 4–15)
NEUTROPHILS # BLD AUTO: 4.8 K/UL (ref 1.8–7.7)
NEUTROPHILS # BLD AUTO: 6.2 K/UL (ref 1.8–7.7)
NEUTROPHILS NFR BLD: 56.4 % (ref 38–73)
NEUTROPHILS NFR BLD: 59.4 % (ref 38–73)
NITRITE UR QL STRIP: POSITIVE
NRBC BLD-RTO: 0 /100 WBC
NRBC BLD-RTO: 0 /100 WBC
PH UR STRIP: 5 [PH] (ref 5–8)
PLATELET # BLD AUTO: 248 K/UL (ref 150–450)
PLATELET # BLD AUTO: 249 K/UL (ref 150–450)
PMV BLD AUTO: 9.5 FL (ref 9.2–12.9)
PMV BLD AUTO: 9.6 FL (ref 9.2–12.9)
POTASSIUM SERPL-SCNC: 4.3 MMOL/L (ref 3.5–5.1)
POTASSIUM SERPL-SCNC: 4.5 MMOL/L (ref 3.5–5.1)
PROCALCITONIN SERPL IA-MCNC: <0.02 NG/ML
PROT SERPL-MCNC: 7.4 G/DL (ref 6–8.4)
PROT UR QL STRIP: NEGATIVE
RBC # BLD AUTO: 4.89 M/UL (ref 4–5.4)
RBC # BLD AUTO: 4.93 M/UL (ref 4–5.4)
RBC #/AREA URNS HPF: 3 /HPF (ref 0–4)
SODIUM SERPL-SCNC: 141 MMOL/L (ref 136–145)
SODIUM SERPL-SCNC: 143 MMOL/L (ref 136–145)
SP GR UR STRIP: 1.01 (ref 1–1.03)
SPECIMEN OUTDATE: NORMAL
SQUAMOUS #/AREA URNS HPF: 2 /HPF
TROPONIN I SERPL DL<=0.01 NG/ML-MCNC: 0.01 NG/ML (ref 0–0.03)
TROPONIN I SERPL DL<=0.01 NG/ML-MCNC: 0.01 NG/ML (ref 0–0.03)
UNIDENT CRYS URNS QL MICRO: ABNORMAL
URN SPEC COLLECT METH UR: ABNORMAL
UROBILINOGEN UR STRIP-ACNC: NEGATIVE EU/DL
WBC # BLD AUTO: 11.01 K/UL (ref 3.9–12.7)
WBC # BLD AUTO: 8.01 K/UL (ref 3.9–12.7)
WBC #/AREA URNS HPF: 1 /HPF (ref 0–5)

## 2024-04-03 PROCEDURE — 84145 PROCALCITONIN (PCT): CPT | Performed by: NURSE PRACTITIONER

## 2024-04-03 PROCEDURE — 25000242 PHARM REV CODE 250 ALT 637 W/ HCPCS: Performed by: STUDENT IN AN ORGANIZED HEALTH CARE EDUCATION/TRAINING PROGRAM

## 2024-04-03 PROCEDURE — 85025 COMPLETE CBC W/AUTO DIFF WBC: CPT | Mod: 91 | Performed by: HOSPITALIST

## 2024-04-03 PROCEDURE — 87040 BLOOD CULTURE FOR BACTERIA: CPT | Performed by: NURSE PRACTITIONER

## 2024-04-03 PROCEDURE — 96365 THER/PROPH/DIAG IV INF INIT: CPT

## 2024-04-03 PROCEDURE — 63600175 PHARM REV CODE 636 W HCPCS: Performed by: STUDENT IN AN ORGANIZED HEALTH CARE EDUCATION/TRAINING PROGRAM

## 2024-04-03 PROCEDURE — 11000001 HC ACUTE MED/SURG PRIVATE ROOM

## 2024-04-03 PROCEDURE — 63600175 PHARM REV CODE 636 W HCPCS: Performed by: NURSE PRACTITIONER

## 2024-04-03 PROCEDURE — 83880 ASSAY OF NATRIURETIC PEPTIDE: CPT | Performed by: HOSPITALIST

## 2024-04-03 PROCEDURE — 80048 BASIC METABOLIC PNL TOTAL CA: CPT | Mod: XB | Performed by: HOSPITALIST

## 2024-04-03 PROCEDURE — 81000 URINALYSIS NONAUTO W/SCOPE: CPT | Performed by: NURSE PRACTITIONER

## 2024-04-03 PROCEDURE — 25000003 PHARM REV CODE 250: Performed by: STUDENT IN AN ORGANIZED HEALTH CARE EDUCATION/TRAINING PROGRAM

## 2024-04-03 PROCEDURE — 84484 ASSAY OF TROPONIN QUANT: CPT | Performed by: STUDENT IN AN ORGANIZED HEALTH CARE EDUCATION/TRAINING PROGRAM

## 2024-04-03 PROCEDURE — 36415 COLL VENOUS BLD VENIPUNCTURE: CPT | Performed by: HOSPITALIST

## 2024-04-03 PROCEDURE — 80061 LIPID PANEL: CPT | Performed by: HOSPITALIST

## 2024-04-03 PROCEDURE — 83605 ASSAY OF LACTIC ACID: CPT | Performed by: NURSE PRACTITIONER

## 2024-04-03 PROCEDURE — 86901 BLOOD TYPING SEROLOGIC RH(D): CPT | Performed by: HOSPITALIST

## 2024-04-03 PROCEDURE — 80053 COMPREHEN METABOLIC PANEL: CPT | Performed by: NURSE PRACTITIONER

## 2024-04-03 PROCEDURE — 63600175 PHARM REV CODE 636 W HCPCS: Performed by: HOSPITALIST

## 2024-04-03 PROCEDURE — 84484 ASSAY OF TROPONIN QUANT: CPT | Mod: 91 | Performed by: HOSPITALIST

## 2024-04-03 PROCEDURE — 85025 COMPLETE CBC W/AUTO DIFF WBC: CPT | Performed by: NURSE PRACTITIONER

## 2024-04-03 PROCEDURE — 99291 CRITICAL CARE FIRST HOUR: CPT

## 2024-04-03 PROCEDURE — 96375 TX/PRO/DX INJ NEW DRUG ADDON: CPT

## 2024-04-03 RX ORDER — ONDANSETRON HYDROCHLORIDE 2 MG/ML
4 INJECTION, SOLUTION INTRAVENOUS EVERY 8 HOURS PRN
Status: DISCONTINUED | OUTPATIENT
Start: 2024-04-03 | End: 2024-04-04 | Stop reason: HOSPADM

## 2024-04-03 RX ORDER — ENOXAPARIN SODIUM 100 MG/ML
40 INJECTION SUBCUTANEOUS EVERY 24 HOURS
Status: DISCONTINUED | OUTPATIENT
Start: 2024-04-03 | End: 2024-04-03 | Stop reason: DRUGHIGH

## 2024-04-03 RX ORDER — NITROGLYCERIN 0.4 MG/1
0.4 TABLET SUBLINGUAL
Status: COMPLETED | OUTPATIENT
Start: 2024-04-03 | End: 2024-04-03

## 2024-04-03 RX ORDER — ACETAMINOPHEN 325 MG/1
650 TABLET ORAL EVERY 4 HOURS PRN
Status: DISCONTINUED | OUTPATIENT
Start: 2024-04-03 | End: 2024-04-04 | Stop reason: HOSPADM

## 2024-04-03 RX ORDER — MORPHINE SULFATE 4 MG/ML
4 INJECTION, SOLUTION INTRAMUSCULAR; INTRAVENOUS
Status: COMPLETED | OUTPATIENT
Start: 2024-04-03 | End: 2024-04-03

## 2024-04-03 RX ORDER — SODIUM CHLORIDE 0.9 % (FLUSH) 0.9 %
10 SYRINGE (ML) INJECTION
Status: DISCONTINUED | OUTPATIENT
Start: 2024-04-03 | End: 2024-04-04 | Stop reason: HOSPADM

## 2024-04-03 RX ORDER — POLYETHYLENE GLYCOL 3350 17 G/17G
17 POWDER, FOR SOLUTION ORAL DAILY PRN
Status: DISCONTINUED | OUTPATIENT
Start: 2024-04-03 | End: 2024-04-04 | Stop reason: HOSPADM

## 2024-04-03 RX ORDER — ENOXAPARIN SODIUM 100 MG/ML
30 INJECTION SUBCUTANEOUS EVERY 24 HOURS
Status: DISCONTINUED | OUTPATIENT
Start: 2024-04-03 | End: 2024-04-04 | Stop reason: HOSPADM

## 2024-04-03 RX ORDER — ASPIRIN 81 MG/1
81 TABLET ORAL DAILY
Status: DISCONTINUED | OUTPATIENT
Start: 2024-04-04 | End: 2024-04-04 | Stop reason: HOSPADM

## 2024-04-03 RX ORDER — PROMETHAZINE HYDROCHLORIDE 25 MG/1
25 TABLET ORAL EVERY 6 HOURS PRN
Status: DISCONTINUED | OUTPATIENT
Start: 2024-04-03 | End: 2024-04-04 | Stop reason: HOSPADM

## 2024-04-03 RX ORDER — ONDANSETRON HYDROCHLORIDE 2 MG/ML
4 INJECTION, SOLUTION INTRAVENOUS
Status: COMPLETED | OUTPATIENT
Start: 2024-04-03 | End: 2024-04-03

## 2024-04-03 RX ADMIN — ONDANSETRON 4 MG: 2 INJECTION INTRAMUSCULAR; INTRAVENOUS at 05:04

## 2024-04-03 RX ADMIN — MORPHINE SULFATE 4 MG: 4 INJECTION INTRAVENOUS at 05:04

## 2024-04-03 RX ADMIN — CEFTRIAXONE SODIUM 2 G: 2 INJECTION, POWDER, FOR SOLUTION INTRAMUSCULAR; INTRAVENOUS at 08:04

## 2024-04-03 RX ADMIN — ENOXAPARIN SODIUM 30 MG: 30 INJECTION SUBCUTANEOUS at 10:04

## 2024-04-03 RX ADMIN — NITROGLYCERIN 0.4 MG: 0.4 TABLET SUBLINGUAL at 06:04

## 2024-04-03 NOTE — ED PROVIDER NOTES
SCRIBE #1 NOTE: I, Ying Aldridge, am scribing for, and in the presence of, Jayden Zhong MD. I have scribed the entire note.       History     Chief Complaint   Patient presents with    Back Pain     C/o Back pain. Sent from  for further evaluation of uti. +weakness per pt. Denies hx uti     Review of patient's allergies indicates:   Allergen Reactions    Amitriptyline     Hydrochlorothiazide      Causes muscle cramping    Lisinopril      hyperkalemia    Oxycodone     Percocet [oxycodone-acetaminophen] Other (See Comments)     Seizures    Belbuca [buprenorphine hcl] Nausea And Vomiting and Other (See Comments)     Black out     Codeine Nausea Only and Rash    Prazosin Other (See Comments)     dizziness         History of Present Illness     HPI    4/3/2024, 6:07 PM  History obtained from the patient and relative      History of Present Illness: Leslie Wood is a 83 y.o. female patient with a PMHx of HTN, HLD, MI, CVA, TIA, GERD who presents to the Emergency Department for evaluation of back pain. The pt was sent to the ED from  for further evaluation after recently being diagnosed with a UTI . Symptoms are constant and moderate in severity. No mitigating or exacerbating factors reported. While in the ED, the pt begins complaining of midline CP. She describes the pain as sharp. Per triage note, pt also complains of generalized weakness. Patient denies any fever, chills, n/v/d, SOB, headaches, cough, congestion, and all other sxs at this time. Pt's relative states that the pt takes metoprolol and lisinopril. No further complaints or concerns at this time.       Arrival mode: Personal vehicle    PCP: Yaneth, Primary Doctor        Past Medical History:  Past Medical History:   Diagnosis Date    Arthritis     Cataract     GERD (gastroesophageal reflux disease)     Heart attack 05/18/2022    Heart attack 05/23/2022    Hyperlipidemia     Hypertension     Stroke        Past Surgical History:  Past Surgical  History:   Procedure Laterality Date    ADENOIDECTOMY      ADRENAL GLAND SURGERY      APPENDECTOMY      BACK SURGERY      fusion l 4-5 s 1,2,3  fusion l 2-3    CORONARY ANGIOGRAPHY N/A 05/20/2022    Procedure: ANGIOGRAM, CORONARY ARTERY;  Surgeon: Domingo Martins MD;  Location: [facility] CATH LAB;  Service: Cardiology;  Laterality: N/A;    CORONARY ANGIOGRAPHY N/A 05/24/2022    Procedure: ANGIOGRAM, CORONARY ARTERY;  Surgeon: Domingo Martins MD;  Location: [facility] CATH LAB;  Service: Cardiology;  Laterality: N/A;    EYE SURGERY      HEMORRHOID SURGERY      HERNIA REPAIR      HYSTERECTOMY      partial    indirect lumbar decompression      percutaneous placement of interspinous extension blocker    LEFT HEART CATHETERIZATION Left 05/20/2022    Procedure: CATHETERIZATION, HEART, LEFT;  Surgeon: Domingo Martins MD;  Location: [facility] CATH LAB;  Service: Cardiology;  Laterality: Left;    TONSILLECTOMY           Family History:  Family History   Problem Relation Age of Onset    Heart disease Mother     Hypertension Mother     Diabetes Mother     Hypertension Father     Kidney disease Father     Breast cancer Sister        Social History:  Social History     Tobacco Use    Smoking status: Never    Smokeless tobacco: Never   Substance and Sexual Activity    Alcohol use: No    Drug use: No    Sexual activity: Not Currently        Review of Systems     Review of Systems   Constitutional:  Negative for chills and fever.   HENT:  Negative for congestion and sore throat.    Respiratory:  Negative for cough and shortness of breath.    Cardiovascular:  Positive for chest pain.   Gastrointestinal:  Negative for diarrhea, nausea and vomiting.   Genitourinary:  Negative for dysuria.   Musculoskeletal:  Positive for back pain.   Skin:  Negative for rash.   Neurological:  Negative for weakness and headaches.   Hematological:  Does not bruise/bleed easily.   All other systems reviewed and are negative.     Physical Exam     Initial Vitals  [04/03/24 1502]   BP Pulse Resp Temp SpO2   134/60 65 18 98.1 °F (36.7 °C) 97 %      MAP       --          Physical Exam  Nursing Notes and Vital Signs Reviewed.  Constitutional: Patient is in no acute distress. Well-developed and well-nourished.  Head: Atraumatic. Normocephalic.  Eyes: PERRL. EOM intact. Conjunctivae are not pale. No scleral icterus.  ENT: Mucous membranes are moist. Oropharynx is clear and symmetric.    Neck: Supple. Full ROM. No lymphadenopathy.  Cardiovascular: Regular rate. Regular rhythm. No murmurs, rubs, or gallops. Distal pulses are 2+ and symmetric.  Pulmonary/Chest: No respiratory distress. Clear to auscultation bilaterally. No wheezing or rales.  Abdominal: Soft and non-distended.  There is no tenderness.  No rebound, guarding, or rigidity. Good bowel sounds.  Genitourinary: No CVA tenderness  Musculoskeletal: Moves all extremities. No obvious deformities. No edema. No calf tenderness.  Skin: Warm and dry.  Neurological:  Alert, awake, and appropriate.  Normal speech.  No acute focal neurological deficits are appreciated.  Psychiatric: Normal affect. Good eye contact. Appropriate in content.     ED Course   Critical Care    Date/Time: 4/3/2024 6:07 PM    Performed by: Jayden Zhong MD  Authorized by: Jayden Zhong MD  Total critical care time (exclusive of procedural time) : 42 minutes  Critical care time was exclusive of separately billable procedures and treating other patients.  Critical care was necessary to treat or prevent imminent or life-threatening deterioration of the following conditions: hypertensive emergency with systolic >210 and chest pain.  Critical care was time spent personally by me on the following activities: blood draw for specimens, development of treatment plan with patient or surrogate, discussions with consultants, interpretation of cardiac output measurements, evaluation of patient's response to treatment, examination of patient, obtaining history  from patient or surrogate, ordering and performing treatments and interventions, ordering and review of laboratory studies, ordering and review of radiographic studies, pulse oximetry, re-evaluation of patient's condition and review of old charts.        ED Vital Signs:  Vitals:    04/03/24 1502 04/03/24 1712 04/03/24 1745 04/03/24 1750   BP: 134/60  (!) 232/91 (!) 210/89   Pulse: 65  (!) 56    Resp: 18 16 18    Temp: 98.1 °F (36.7 °C)      TempSrc: Oral      SpO2: 97%  98%    Weight: 61 kg (134 lb 7.7 oz)       04/03/24 1800 04/03/24 1817 04/03/24 1820 04/03/24 2020   BP: (!) 206/81 (!) 99/58 (!) 110/56 (!) 182/94   Pulse:  66 62 (!) 57   Resp:  18 18 18   Temp:    98.2 °F (36.8 °C)   TempSrc:    Oral   SpO2:  98% 98% 97%   Weight:        04/04/24 0046 04/04/24 0633 04/04/24 0827   BP: (!) 170/73 (!) 170/87 (!) 180/77   Pulse: (!) 54 (!) 55    Resp: 18     Temp: 98.1 °F (36.7 °C)     TempSrc: Oral     SpO2: 97% 96%    Weight:          Abnormal Lab Results:  Labs Reviewed   CBC W/ AUTO DIFFERENTIAL - Abnormal; Notable for the following components:       Result Value    MCHC 31.6 (*)     All other components within normal limits   COMPREHENSIVE METABOLIC PANEL - Abnormal; Notable for the following components:    Glucose 146 (*)     BUN 36 (*)     eGFR 40 (*)     All other components within normal limits   URINALYSIS, REFLEX TO URINE CULTURE - Abnormal; Notable for the following components:    Nitrite, UA Positive (*)     Leukocytes, UA 2+ (*)     All other components within normal limits    Narrative:     Specimen Source->Urine   URINALYSIS MICROSCOPIC - Abnormal; Notable for the following components:    Hyaline Casts, UA 26 (*)     All other components within normal limits    Narrative:     Specimen Source->Urine   BASIC METABOLIC PANEL - Abnormal; Notable for the following components:    CO2 22 (*)     Glucose 111 (*)     BUN 38 (*)     eGFR 44 (*)     All other components within normal limits   CBC W/ AUTO  DIFFERENTIAL - Abnormal; Notable for the following components:    MCHC 31.9 (*)     Immature Grans (Abs) 0.05 (*)     Mono # 1.4 (*)     All other components within normal limits   CULTURE, BLOOD    Narrative:     Aerobic and anaerobic   CULTURE, BLOOD    Narrative:     Aerobic and anaerobic   LACTIC ACID, PLASMA   PROCALCITONIN   LACTIC ACID, PLASMA   TROPONIN I   B-TYPE NATRIURETIC PEPTIDE   TROPONIN I   LIPID PANEL   TYPE & SCREEN        All Lab Results:  Results for orders placed or performed during the hospital encounter of 04/03/24   Blood culture x two cultures. Draw prior to antibiotics.    Specimen: Peripheral, Forearm, Right; Blood   Result Value Ref Range    Blood Culture, Routine No Growth to date    Blood culture x two cultures. Draw prior to antibiotics.    Specimen: Peripheral, Antecubital, Right; Blood   Result Value Ref Range    Blood Culture, Routine No Growth to date    CBC auto differential   Result Value Ref Range    WBC 8.01 3.90 - 12.70 K/uL    RBC 4.93 4.00 - 5.40 M/uL    Hemoglobin 14.7 12.0 - 16.0 g/dL    Hematocrit 46.5 37.0 - 48.5 %    MCV 94 82 - 98 fL    MCH 29.8 27.0 - 31.0 pg    MCHC 31.6 (L) 32.0 - 36.0 g/dL    RDW 12.6 11.5 - 14.5 %    Platelets 248 150 - 450 K/uL    MPV 9.6 9.2 - 12.9 fL    Immature Granulocytes 0.4 0.0 - 0.5 %    Gran # (ANC) 4.8 1.8 - 7.7 K/uL    Immature Grans (Abs) 0.03 0.00 - 0.04 K/uL    Lymph # 2.2 1.0 - 4.8 K/uL    Mono # 0.8 0.3 - 1.0 K/uL    Eos # 0.2 0.0 - 0.5 K/uL    Baso # 0.05 0.00 - 0.20 K/uL    nRBC 0 0 /100 WBC    Gran % 59.4 38.0 - 73.0 %    Lymph % 27.5 18.0 - 48.0 %    Mono % 10.1 4.0 - 15.0 %    Eosinophil % 2.0 0.0 - 8.0 %    Basophil % 0.6 0.0 - 1.9 %    Differential Method Automated    Comprehensive metabolic panel   Result Value Ref Range    Sodium 143 136 - 145 mmol/L    Potassium 4.5 3.5 - 5.1 mmol/L    Chloride 107 95 - 110 mmol/L    CO2 24 23 - 29 mmol/L    Glucose 146 (H) 70 - 110 mg/dL    BUN 36 (H) 8 - 23 mg/dL    Creatinine 1.3 0.5  - 1.4 mg/dL    Calcium 9.9 8.7 - 10.5 mg/dL    Total Protein 7.4 6.0 - 8.4 g/dL    Albumin 3.9 3.5 - 5.2 g/dL    Total Bilirubin 0.6 0.1 - 1.0 mg/dL    Alkaline Phosphatase 91 55 - 135 U/L    AST 27 10 - 40 U/L    ALT 19 10 - 44 U/L    eGFR 40 (A) >60 mL/min/1.73 m^2    Anion Gap 12 8 - 16 mmol/L   Lactic acid, plasma #1   Result Value Ref Range    Lactate (Lactic Acid) 1.5 0.5 - 2.2 mmol/L   Urinalysis, Reflex to Urine Culture Urine, Clean Catch    Specimen: Urine   Result Value Ref Range    Specimen UA Urine, Clean Catch     Color, UA Yellow Yellow, Straw, Izabela    Appearance, UA Clear Clear    pH, UA 5.0 5.0 - 8.0    Specific Gravity, UA 1.015 1.005 - 1.030    Protein, UA Negative Negative    Glucose, UA Negative Negative    Ketones, UA Negative Negative    Bilirubin (UA) Negative Negative    Occult Blood UA Negative Negative    Nitrite, UA Positive (A) Negative    Urobilinogen, UA Negative <2.0 EU/dL    Leukocytes, UA 2+ (A) Negative   Procalcitonin   Result Value Ref Range    Procalcitonin <0.02 <0.25 ng/mL   Lactic acid, plasma #2   Result Value Ref Range    Lactate (Lactic Acid) 0.9 0.5 - 2.2 mmol/L   Urinalysis Microscopic   Result Value Ref Range    RBC, UA 3 0 - 4 /hpf    WBC, UA 1 0 - 5 /hpf    Bacteria Rare None-Occ /hpf    Squam Epithel, UA 2 /hpf    Hyaline Casts, UA 26 (A) 0-1/lpf /lpf    Unclass Elly UA Rare None-Moderate    Microscopic Comment SEE COMMENT    Troponin I #1   Result Value Ref Range    Troponin I 0.008 0.000 - 0.026 ng/mL   Basic metabolic panel   Result Value Ref Range    Sodium 141 136 - 145 mmol/L    Potassium 4.3 3.5 - 5.1 mmol/L    Chloride 105 95 - 110 mmol/L    CO2 22 (L) 23 - 29 mmol/L    Glucose 111 (H) 70 - 110 mg/dL    BUN 38 (H) 8 - 23 mg/dL    Creatinine 1.2 0.5 - 1.4 mg/dL    Calcium 9.9 8.7 - 10.5 mg/dL    Anion Gap 14 8 - 16 mmol/L    eGFR 44 (A) >60 mL/min/1.73 m^2   CBC auto differential   Result Value Ref Range    WBC 11.01 3.90 - 12.70 K/uL    RBC 4.89 4.00 - 5.40  M/uL    Hemoglobin 14.7 12.0 - 16.0 g/dL    Hematocrit 46.1 37.0 - 48.5 %    MCV 94 82 - 98 fL    MCH 30.1 27.0 - 31.0 pg    MCHC 31.9 (L) 32.0 - 36.0 g/dL    RDW 12.6 11.5 - 14.5 %    Platelets 249 150 - 450 K/uL    MPV 9.5 9.2 - 12.9 fL    Immature Granulocytes 0.5 0.0 - 0.5 %    Gran # (ANC) 6.2 1.8 - 7.7 K/uL    Immature Grans (Abs) 0.05 (H) 0.00 - 0.04 K/uL    Lymph # 3.1 1.0 - 4.8 K/uL    Mono # 1.4 (H) 0.3 - 1.0 K/uL    Eos # 0.2 0.0 - 0.5 K/uL    Baso # 0.06 0.00 - 0.20 K/uL    nRBC 0 0 /100 WBC    Gran % 56.4 38.0 - 73.0 %    Lymph % 28.2 18.0 - 48.0 %    Mono % 12.8 4.0 - 15.0 %    Eosinophil % 1.6 0.0 - 8.0 %    Basophil % 0.5 0.0 - 1.9 %    Differential Method Automated    Brain natriuretic peptide - if not done in ER   Result Value Ref Range    BNP 37 0 - 99 pg/mL   Troponin I   Result Value Ref Range    Troponin I 0.008 0.000 - 0.026 ng/mL   Type and Screen   Result Value Ref Range    Group & Rh O POS     Indirect [place] NEG     Specimen Outdate 04/06/2024 23:59                The Emergency Provider reviewed the vital signs and test results, which are outlined above.     ED Discussion           ED Course as of 04/04/24 1051   Wed Apr 03, 2024   1803 Called to the patient's bedside as she started to have sharp epigastric/chest pain and her blood pressure drastically increased. EKG obtained immediately demonstrates no ST segment changes or repolarization abnormalities that would be suggestive of active ischemia. [MC]   1945 Lactic Acid Level: 0.9 []   1945 NITRITE UA(!): Positive []   1945 Leukocyte Esterase, UA(!): 2+  Will treat with IV rocephin []   1956 Troponin I: 0.008 []   2023 Lactic Acid Level: 0.9  Negative lactate. Patient is well appearing with no clinical signs of sepsis. Will hold on fluid bolus, especially given the patient's recurrent episodes of hypertension. [MC]   2024 Antibiotics have been started.  [MC]      ED Course User Index  [MC] Jayden Zhong MD     Medical  Decision Making  Ddx includes cystitis, pyelonephritis, ACS, PE, dissection, hypertensive crisis, among others.     The patient initially came in for back pain and urinary symptoms concerning for CINTHIA. While in the ER, she began to have very concerning symptoms for ACS - including midline, crushing chest pain with an associated blood pressure of >200 systolic. She got significant relief of her symptoms with nitroglycerin, as well as her blood pressure. She has a history of heart attack in the past per her daughter. She also has a history of stroke. She does have a history of recurrent chest pain over the past week, which is new for her. We initiated therapy in the ER with rocephin for UTI. No indication for sepsis work-up at this time, negative lactate and negative WBCs. She also has some We will admit to hospital medicine for ACS rule out.     Amount and/or Complexity of Data Reviewed  Independent Historian: caregiver     Details: Daughter at the bedside provided supplemental history regarding patient's cardiac history and last week's chest pain, which the patient did not mention.  External Data Reviewed: notes.     Details: Reviewed cardiology note from 02/15 for atypical chest pain  Labs: ordered. Decision-making details documented in ED Course.  Discussion of management or test interpretation with external provider(s): Discussed case with hospital medicine who will admit    Risk  OTC drugs.  Prescription drug management.  Decision regarding hospitalization.                ED Medication(s):  Medications   sodium chloride 0.9% flush 10 mL (has no administration in time range)   acetaminophen tablet 650 mg (has no administration in time range)   polyethylene glycol packet 17 g (has no administration in time range)   ondansetron injection 4 mg (has no administration in time range)   promethazine tablet 25 mg (has no administration in time range)   aspirin EC tablet 81 mg (81 mg Oral Given 4/4/24 7441)   cefTRIAXone  (Rocephin) 1 g in dextrose 5 % in water (D5W) 100 mL IVPB (MB+) (has no administration in time range)   enoxaparin injection 30 mg (30 mg Subcutaneous Given 4/3/24 2208)   atorvastatin tablet 10 mg (has no administration in time range)   metoprolol succinate (TOPROL-XL) 24 hr tablet 25 mg (0 mg Oral Hold 4/4/24 0827)   losartan tablet 50 mg (50 mg Oral Given 4/4/24 0827)   DULoxetine DR capsule 30 mg (30 mg Oral Given 4/4/24 0828)   hydrALAZINE injection 10 mg (has no administration in time range)   morphine injection 4 mg (4 mg Intravenous Given 4/3/24 1712)   ondansetron injection 4 mg (4 mg Intravenous Given 4/3/24 1711)   nitroGLYCERIN SL tablet 0.4 mg (0.4 mg Sublingual Given 4/3/24 1811)   cefTRIAXone (ROCEPHIN) 2 g in dextrose 5 % in water (D5W) 100 mL IVPB (MB+) (0 g Intravenous Stopped 4/3/24 2050)       New Prescriptions    No medications on file               Scribe Attestation:   Scribe #1: I performed the above scribed service and the documentation accurately describes the services I performed. I attest to the accuracy of the note.     Attending:   Physician Attestation Statement for Scribe #1: I, Jayden Zhong MD, personally performed the services described in this documentation, as scribed by Ying Aldridge, in my presence, and it is both accurate and complete.           Clinical Impression       ICD-10-CM ICD-9-CM   1. Back pain, unspecified back location, unspecified back pain laterality, unspecified chronicity  M54.9 724.5   2. Acute cystitis without hematuria  N30.00 595.0   3. Chest pain, unspecified type  R07.9 786.50   4. Chest pain  R07.9 786.50              Jayden Zhong MD  04/03/24 2033       Jayden Zhong MD  04/04/24 1051

## 2024-04-03 NOTE — FIRST PROVIDER EVALUATION
Medical screening examination initiated.  I have conducted a focused provider triage encounter, findings are as follows:    Brief history of present illness:  Sent by urgent Care for UTI    Vitals:    04/03/24 1502   BP: 134/60   BP Location: Right arm   Patient Position: Sitting   Pulse: 65   Resp: 18   Temp: 98.1 °F (36.7 °C)   TempSrc: Oral   SpO2: 97%   Weight: 61 kg (134 lb 7.7 oz)       Pertinent physical exam:  Uncomfortable    Brief workup plan:  Work    Preliminary workup initiated; this workup will be continued and followed by the physician or advanced practice provider that is assigned to the patient when roomed.

## 2024-04-04 VITALS
OXYGEN SATURATION: 95 % | HEART RATE: 57 BPM | RESPIRATION RATE: 20 BRPM | DIASTOLIC BLOOD PRESSURE: 74 MMHG | BODY MASS INDEX: 25.41 KG/M2 | TEMPERATURE: 98 F | SYSTOLIC BLOOD PRESSURE: 179 MMHG | WEIGHT: 134.5 LBS

## 2024-04-04 PROBLEM — M54.50 LUMBAR BACK PAIN: Status: ACTIVE | Noted: 2024-04-04

## 2024-04-04 PROBLEM — N30.00 ACUTE CYSTITIS: Status: ACTIVE | Noted: 2024-04-04

## 2024-04-04 LAB
CHOLEST SERPL-MCNC: 128 MG/DL (ref 120–199)
CHOLEST/HDLC SERPL: 2.8 {RATIO} (ref 2–5)
HDLC SERPL-MCNC: 46 MG/DL (ref 40–75)
HDLC SERPL: 35.9 % (ref 20–50)
LDLC SERPL CALC-MCNC: 66.4 MG/DL (ref 63–159)
NONHDLC SERPL-MCNC: 82 MG/DL
OHS QRS DURATION: 68 MS
OHS QRS DURATION: 74 MS
OHS QTC CALCULATION: 397 MS
OHS QTC CALCULATION: 420 MS
TRIGL SERPL-MCNC: 78 MG/DL (ref 30–150)

## 2024-04-04 PROCEDURE — 99222 1ST HOSP IP/OBS MODERATE 55: CPT | Mod: ,,, | Performed by: INTERNAL MEDICINE

## 2024-04-04 PROCEDURE — 25000003 PHARM REV CODE 250: Performed by: NURSE PRACTITIONER

## 2024-04-04 PROCEDURE — 25000003 PHARM REV CODE 250: Performed by: HOSPITALIST

## 2024-04-04 PROCEDURE — 63600175 PHARM REV CODE 636 W HCPCS: Performed by: NURSE PRACTITIONER

## 2024-04-04 RX ORDER — DULOXETIN HYDROCHLORIDE 30 MG/1
30 CAPSULE, DELAYED RELEASE ORAL DAILY
Status: DISCONTINUED | OUTPATIENT
Start: 2024-04-04 | End: 2024-04-04 | Stop reason: HOSPADM

## 2024-04-04 RX ORDER — POLYETHYLENE GLYCOL 3350 17 G/17G
17 POWDER, FOR SOLUTION ORAL DAILY
Qty: 30 EACH | Refills: 0 | Status: SHIPPED | OUTPATIENT
Start: 2024-04-04 | End: 2024-04-04

## 2024-04-04 RX ORDER — ATORVASTATIN CALCIUM 10 MG/1
10 TABLET, FILM COATED ORAL NIGHTLY
Status: DISCONTINUED | OUTPATIENT
Start: 2024-04-04 | End: 2024-04-04 | Stop reason: HOSPADM

## 2024-04-04 RX ORDER — ASPIRIN 81 MG/1
81 TABLET ORAL DAILY
Status: DISCONTINUED | OUTPATIENT
Start: 2024-04-04 | End: 2024-04-04

## 2024-04-04 RX ORDER — AMOXICILLIN AND CLAVULANATE POTASSIUM 500; 125 MG/1; MG/1
1 TABLET, FILM COATED ORAL 2 TIMES DAILY
Qty: 4 TABLET | Refills: 0 | Status: SHIPPED | OUTPATIENT
Start: 2024-04-04 | End: 2024-04-06

## 2024-04-04 RX ORDER — HYDRALAZINE HYDROCHLORIDE 20 MG/ML
10 INJECTION INTRAMUSCULAR; INTRAVENOUS EVERY 6 HOURS PRN
Status: DISCONTINUED | OUTPATIENT
Start: 2024-04-04 | End: 2024-04-04 | Stop reason: HOSPADM

## 2024-04-04 RX ORDER — METOPROLOL SUCCINATE 25 MG/1
25 TABLET, EXTENDED RELEASE ORAL DAILY
Status: DISCONTINUED | OUTPATIENT
Start: 2024-04-04 | End: 2024-04-04 | Stop reason: HOSPADM

## 2024-04-04 RX ORDER — POLYETHYLENE GLYCOL 3350 17 G/17G
17 POWDER, FOR SOLUTION ORAL DAILY
Qty: 30 EACH | Refills: 0 | Status: SHIPPED | OUTPATIENT
Start: 2024-04-04 | End: 2024-06-05

## 2024-04-04 RX ORDER — LOSARTAN POTASSIUM 50 MG/1
50 TABLET ORAL 2 TIMES DAILY
Status: DISCONTINUED | OUTPATIENT
Start: 2024-04-04 | End: 2024-04-04 | Stop reason: HOSPADM

## 2024-04-04 RX ORDER — POLYETHYLENE GLYCOL 3350 17 G/17G
17 POWDER, FOR SOLUTION ORAL DAILY
Status: DISCONTINUED | OUTPATIENT
Start: 2024-04-04 | End: 2024-04-04 | Stop reason: HOSPADM

## 2024-04-04 RX ORDER — AMOXICILLIN AND CLAVULANATE POTASSIUM 500; 125 MG/1; MG/1
1 TABLET, FILM COATED ORAL 2 TIMES DAILY
Qty: 4 TABLET | Refills: 0 | Status: SHIPPED | OUTPATIENT
Start: 2024-04-04 | End: 2024-04-04

## 2024-04-04 RX ADMIN — LOSARTAN POTASSIUM 50 MG: 50 TABLET, FILM COATED ORAL at 08:04

## 2024-04-04 RX ADMIN — DULOXETINE HYDROCHLORIDE 30 MG: 30 CAPSULE, DELAYED RELEASE ORAL at 08:04

## 2024-04-04 RX ADMIN — POLYETHYLENE GLYCOL 3350 17 G: 17 POWDER, FOR SOLUTION ORAL at 11:04

## 2024-04-04 RX ADMIN — ASPIRIN 81 MG: 81 TABLET, COATED ORAL at 08:04

## 2024-04-04 RX ADMIN — CEFTRIAXONE 1 G: 1 INJECTION, POWDER, FOR SOLUTION INTRAMUSCULAR; INTRAVENOUS at 11:04

## 2024-04-04 RX ADMIN — SODIUM CHLORIDE 250 ML: 9 INJECTION, SOLUTION INTRAVENOUS at 11:04

## 2024-04-04 NOTE — PROGRESS NOTES
Pharmacist Renal Dose Adjustment Note    Leslie Wood is a 83 y.o. female being treated with the medication enoxaparin.     Patient Data:    Vital Signs (Most Recent):  Temp: 98.2 °F (36.8 °C) (04/03/24 2020)  Pulse: (!) 57 (04/03/24 2020)  Resp: 18 (04/03/24 2020)  BP: (!) 182/94 (04/03/24 2020)  SpO2: 97 % (04/03/24 2020) Vital Signs (72h Range):  Temp:  [98.1 °F (36.7 °C)-98.2 °F (36.8 °C)]   Pulse:  [56-66]   Resp:  [16-18]   BP: ()/(56-94)   SpO2:  [97 %-98 %]      Recent Labs   Lab 04/03/24  1531   CREATININE 1.3     Serum creatinine: 1.3 mg/dL 04/03/24 1531  Estimated creatinine clearance: 26.5 mL/min    Medication: enoxaparin 40 mg SQ daily will be changed to enoxaparin 30 mg SQ daily per pharmacy renal dose adjustment protocol for patients with CrCl less than 30 mL/min.    Pharmacist's Name: Megan Castro PharmD  Pharmacist's Extension: [phone]     Thank you for allowing us to participate in this patient's care.     Megan Castro PharmD 04/03/2024 9:25 PM

## 2024-04-04 NOTE — ED NOTES
"MD made aware that patient continues to have epigastric chest pain that she states "takes my breath away" lasting about 10 seconds each time  "

## 2024-04-04 NOTE — CONSULTS
[facility] - Emergency Dept.  Cardiology  Consult Note    Patient Name: Leslie Wood  MRN: [id number]  Admission Date: 4/3/2024  Hospital Length of Stay: 1 days  Code Status: DNR   Attending Provider: Nikhil Power MD   Consulting Provider: Meka Franklin PA-C  Primary Care Physician: Yaneth, Primary Doctor  Principal Problem:Lumbar back pain    Patient information was obtained from patient, relative(s), past medical records, and ER records.     Inpatient consult to Cardiology  Consult performed by: Meka Franklin PA-C  Consult ordered by: Jose Enrique Gomez MD        Subjective:     Chief Complaint:  Lumbar back pain/UTI    HPI:   Ms. Wood is an 83 year old female patient whose current medical conditions include HLD, HTN, GERD, prior CVA, and Takotsubo cardiomyopathy with recovered EF who presented to [facility] ED yesterday evening due to lower back pain after recent UTI diagnosis. Associated symptoms included generalized weakness and epigastric pain. She denied any associated fever, chills, SOB, palpitations, near syncope, or syncope. Initial workup in ED un-revealing with exception of UTI however patient admitted due to epigastric discomfort/atypical CP. Cardiology consulted to assist with management. Patient seen and examined in ED. Feels well overall. Describes random bouts of epigastric discomfort, not related to exertion, feels may be related to her UTI. Does have some TTP on exam. No exertional CP symptoms. BP labile. She reports compliance with her medications. Followed in clinic by Dr. Martins. Troponin x 2 negative. EKG reviewed, no acute ischemic changes, normal EKG. CT of abd/pelvis showed mild retained stool throughout the colon, colonic diverticulosis without evidence of acute diverticulitis, and non-obstructing left renal calculus.          Past Medical History:   Diagnosis Date    Arthritis     Cataract     GERD (gastroesophageal reflux disease)     Heart attack 05/18/2022    Heart attack  05/23/2022    Hyperlipidemia     Hypertension     Stroke        Past Surgical History:   Procedure Laterality Date    ADENOIDECTOMY      ADRENAL GLAND SURGERY      APPENDECTOMY      BACK SURGERY      fusion l 4-5 s 1,2,3  fusion l 2-3    CORONARY ANGIOGRAPHY N/A 05/20/2022    Procedure: ANGIOGRAM, CORONARY ARTERY;  Surgeon: Domingo Martins MD;  Location: [facility] CATH LAB;  Service: Cardiology;  Laterality: N/A;    CORONARY ANGIOGRAPHY N/A 05/24/2022    Procedure: ANGIOGRAM, CORONARY ARTERY;  Surgeon: Domingo Martins MD;  Location: [facility] CATH LAB;  Service: Cardiology;  Laterality: N/A;    EYE SURGERY      HEMORRHOID SURGERY      HERNIA REPAIR      HYSTERECTOMY      partial    indirect lumbar decompression      percutaneous placement of interspinous extension blocker    LEFT HEART CATHETERIZATION Left 05/20/2022    Procedure: CATHETERIZATION, HEART, LEFT;  Surgeon: Domingo Martins MD;  Location: [facility] CATH LAB;  Service: Cardiology;  Laterality: Left;    TONSILLECTOMY         Review of patient's allergies indicates:   Allergen Reactions    Amitriptyline     Hydrochlorothiazide      Causes muscle cramping    Lisinopril      hyperkalemia    Oxycodone     Percocet [oxycodone-acetaminophen] Other (See Comments)     Seizures    Belbuca [buprenorphine hcl] Nausea And Vomiting and Other (See Comments)     Black out     Codeine Nausea Only and Rash    Prazosin Other (See Comments)     dizziness       No current facility-administered medications on file prior to encounter.     Current Outpatient Medications on File Prior to Encounter   Medication Sig    aspirin (ECOTRIN) 81 MG EC tablet Take 1 tablet (81 mg total) by mouth once daily.    atorvastatin (LIPITOR) 10 MG tablet Take 1 tablet (10 mg total) by mouth every evening.    DULoxetine (CYMBALTA) 30 MG capsule Take 30 mg by mouth once daily.    furosemide (LASIX) 20 MG tablet TAKE 1 TABLET BY MOUTH ONCE A DAY    losartan (COZAAR) 50 MG tablet TAKE 1 TABLET BY MOUTH TWICE  DAILY    metoprolol succinate (TOPROL-XL) 25 MG 24 hr tablet Take 1 tablet (25 mg total) by mouth once daily.    mupirocin (BACTROBAN) 2 % ointment Apply topically 2 (two) times daily.    NUCYNTA 50 mg Tab Take 1 tablet (50 mg total) by mouth every 8 (eight) hours as needed (severe pain). RESTART WHEN OK PER PAIN MANAGEMENT PROVIDER    tiZANidine (ZANAFLEX) 4 MG tablet     [DISCONTINUED] carvediloL (COREG) 6.25 MG tablet Take 1 tablet (6.25 mg total) by mouth 2 (two) times daily. TAKE (1) TABLET BY MOUTH 2 TIMES A DAY WITH MEALS    [DISCONTINUED] cloNIDine (CATAPRES) 0.1 MG tablet Take 1 tablet (0.1 mg total) by mouth 2 (two) times daily. To take an extra dose if BP >160/90    [DISCONTINUED] diclofenac sodium (VOLTAREN) 1 % Gel Apply 2 g topically 3 (three) times daily.    [DISCONTINUED] isosorbide mononitrate (IMDUR) 30 MG 24 hr tablet TAKE 1 TABLET BY MOUTH TWICE A DAY    [DISCONTINUED] metoprolol tartrate (LOPRESSOR) 25 MG tablet Take 1 tablet (25 mg total) by mouth 3 (three) times daily.    [DISCONTINUED] nitroGLYCERIN (NITROSTAT) 0.4 MG SL tablet Place 1 tablet (0.4 mg total) under the tongue every 5 (five) minutes as needed for Chest pain.    [DISCONTINUED] valsartan (DIOVAN) 160 MG tablet TAKE 1 TABLET BY MOUTH TWICE A DAY     Family History       Problem Relation (Age of Onset)    Breast cancer Sister    Diabetes Mother    Heart disease Mother    Hypertension Mother, Father    Kidney disease Father          Tobacco Use    Smoking status: Never    Smokeless tobacco: Never   Substance and Sexual Activity    Alcohol use: No    Drug use: No    Sexual activity: Not Currently     Review of Systems   Constitutional: Positive for malaise/fatigue.   HENT: Negative.     Eyes: Negative.    Cardiovascular: Negative.    Respiratory: Negative.     Endocrine: Negative.    Hematologic/Lymphatic: Negative.    Skin: Negative.    Musculoskeletal:  Positive for back pain.   Gastrointestinal:  Positive for abdominal pain  (epigastric).   Genitourinary: Negative.    Neurological:  Positive for weakness.   Psychiatric/Behavioral: Negative.     Allergic/Immunologic: Negative.      Objective:     Vital Signs (Most Recent):  Temp: 98.1 °F (36.7 °C) (04/04/24 0046)  Pulse: (!) 55 (04/04/24 0633)  Resp: 18 (04/04/24 0046)  BP: (!) 180/77 (04/04/24 0827)  SpO2: 96 % (04/04/24 0633) Vital Signs (24h Range):  Temp:  [98.1 °F (36.7 °C)-98.2 °F (36.8 °C)] 98.1 °F (36.7 °C)  Pulse:  [54-66] 55  Resp:  [16-18] 18  SpO2:  [96 %-98 %] 96 %  BP: ()/(56-94) 180/77     Weight: 61 kg (134 lb 7.7 oz)  Body mass index is 25.41 kg/m².    SpO2: 96 %         Intake/Output Summary (Last 24 hours) at 4/4/2024 1101  Last data filed at 4/3/2024 2050  Gross per 24 hour   Intake 100 ml   Output --   Net 100 ml       Lines/Drains/Airways       Peripheral Intravenous Line  Duration                  Peripheral IV - Single Lumen 04/03/24 0000 Right Antecubital 1 day                     Physical Exam  Vitals and nursing note reviewed.   Constitutional:       General: She is not in acute distress.     Appearance: Normal appearance. She is well-developed. She is not diaphoretic.   HENT:      Head: Normocephalic and atraumatic.   Eyes:      General:         Right eye: No discharge.         Left eye: No discharge.      Pupils: Pupils are equal, round, and reactive to light.   Neck:      Thyroid: No thyromegaly.      Vascular: No JVD.      Trachea: No tracheal deviation.   Cardiovascular:      Rate and Rhythm: Regular rhythm. Bradycardia present.      Heart sounds: Normal heart sounds, S1 normal and S2 normal. No murmur heard.  Pulmonary:      Effort: Pulmonary effort is normal. No respiratory distress.      Breath sounds: Normal breath sounds.   Abdominal:      General: There is no distension.      Tenderness: There is abdominal tenderness. Guarding: epigastric.   Musculoskeletal:      Cervical back: Neck supple.      Right lower leg: No edema.      Left lower leg:  No edema.   Skin:     General: Skin is warm and dry.      Findings: No erythema.   Neurological:      Mental Status: She is alert and oriented to person, place, and time.   Psychiatric:         Mood and Affect: Mood normal.         Behavior: Behavior normal.          Significant Labs: CMP   Recent Labs   Lab 04/03/24  1531 04/03/24  2208    141   K 4.5 4.3    105   CO2 24 22*   * 111*   BUN 36* 38*   CREATININE 1.3 1.2   CALCIUM 9.9 9.9   PROT 7.4  --    ALBUMIN 3.9  --    BILITOT 0.6  --    ALKPHOS 91  --    AST 27  --    ALT 19  --    ANIONGAP 12 14   , CBC   Recent Labs   Lab 04/03/24  1609 04/03/24  2208   WBC 8.01 11.01   HGB 14.7 14.7   HCT 46.5 46.1    249   , Troponin   Recent Labs   Lab 04/03/24  1914 04/03/24  2208   TROPONINI 0.008 0.008   , and All pertinent lab results from the last 24 hours have been reviewed.    Significant Imaging: Echocardiogram: Transthoracic echo (TTE) complete ([facility] Only):   Results for orders placed or performed during the hospital encounter of 12/15/22   Echo   Result Value Ref Range    BSA 1.63 m2    LA WIDTH 3.20 cm    IVC diameter 1.31 cm    Left Ventricular Outflow Tract Mean Velocity 0.74 cm/s    Left Ventricular Outflow Tract Mean Gradient 2.35 mmHg    LVIDd 3.59 3.5 - 6.0 cm    IVS 1.08 0.6 - 1.1 cm    Posterior Wall 1.02 0.6 - 1.1 cm    LVIDs 2.49 2.1 - 4.0 cm    FS 31 28 - 44 %    STJ 2.97 cm    Ascending aorta 3.10 cm    LV mass 115.38 g    LA size 3.31 cm    RVDD 3.34 cm    TAPSE 2.10 cm    Left Ventricle Relative Wall Thickness 0.57 cm    AV regurgitation pressure 1/2 time [id number] ms    AV mean gradient 5 mmHg    AV valve area 1.85 cm2    AV Velocity Ratio 0.64     AV index (prosthetic) 0.75     MV valve area p 1/2 method 3.24 cm2    E/A ratio 0.99     E wave deceleration time 234.23 msec    IVRT 99.90 msec    LVOT diameter 1.77 cm    LVOT area 2.5 cm2    LVOT peak nuzhat 0.91 m/s    LVOT peak VTI 31.80 cm    Ao peak nuzhat 1.42 m/s     Ao VTI 42.3 cm    RVOT peak mehrdad 0.65 m/s    RVOT peak VTI 21.5 cm    LVOT stroke volume 78.21 cm3    AV peak gradient 8 mmHg    PV mean gradient 1.17 mmHg    MV Peak E Mehrdad 0.77 m/s    AR Max Mehrdad 4.54 m/s    TR Max Mehrdad 3.50 m/s    MV stenosis pressure 1/2 time 67.93 ms    MV Peak A Mehrdad 0.78 m/s    LV Systolic Volume 22.07 mL    LV Systolic Volume Index 13.8 mL/m2    LV Diastolic Volume 54.01 mL    LV Diastolic Volume Index 33.76 mL/m2    LV Mass Index 72 g/m2    RA Major Axis 3.98 cm    Left Atrium Major Axis 4.50 cm    Triscuspid Valve Regurgitation Peak Gradient 49 mmHg    RA Width 2.50 cm    Right Atrial Pressure (from IVC) 3 mmHg    EF 60 %    TV resting pulmonary artery pressure 52 mmHg    Narrative    · The left ventricle is normal in size with concentric remodeling and   normal systolic function.  · The estimated ejection fraction is 60%.  · Normal left ventricular diastolic function.  · Normal right ventricular size with normal right ventricular systolic   function.  · Mild aortic regurgitation.  · There is moderate pulmonary hypertension.  · Normal central venous pressure (3 mmHg).  · The estimated PA systolic pressure is 52 mmHg.       and EKG: Reviewed  Assessment and Plan:   Patient who presents with back pain/UTI/epigastric discomfort. CV workup unremarkable. Recommend resumption of home meds. Can f/u in clinic.     Acute cystitis  -Continue abx  -Mgmt as per hospital medicine    Hyperlipidemia LDL goal <100  -Statin    Atypical chest pain  -Complains of epigastric pain, with tenderness on exam  -Likely related to mild constipation, UTI  -No exertional CP symptoms  -Troponin x 2 negative, EKG essentially normal, no acute ischemic changes  -Prior LHC in 5/22 showed normal coronaries   -No CV workup planned/indicated    Hypertension associated with diabetes  -Continue BB, ARB    Abdominal pain  -Workup and mgmt as per hospital medicine        VTE Risk Mitigation (From admission, onward)            Ordered     enoxaparin injection 30 mg  Daily         04/03/24 2119     IP VTE HIGH RISK PATIENT  Once         04/03/24 2117     Place sequential compression device  Until discontinued         04/03/24 2117                    Thank you for your consult. I will sign off. Please contact us if you have any additional questions.    Meka Franklin PA-C  Cardiology   [facility] - Emergency Dept.

## 2024-04-04 NOTE — ASSESSMENT & PLAN NOTE
"Patient's FSGs are controlled on current medication regimen.  Last A1c reviewed-   Lab Results   Component Value Date    HGBA1C 5.6 11/08/2023     Most recent fingerstick glucose reviewed- No results for input(s): "POCTGLUCOSE" in the last 24 hours.  Current correctional scale  Low  titrate as needed  anti-hyperglycemic dose as follows-   Antihyperglycemics (From admission, onward)      None        Plan:  -SSI  -A1c  -Accu-checks  -Hold oral hypoglycemics while patient is in the hospital  -Hypoglycemic protocol      "

## 2024-04-04 NOTE — H&P
Pending sale to [facility] - Emergency Dept.  [facility]  History & Physical    Patient Name: Leslie Wood  MRN: [id number]  Patient Class: IP- Inpatient  Admission Date: 4/3/2024  Attending Physician: Jose Enrique Gomez MD   Primary Care Provider: Yaneth Primary Doctor         Patient information was obtained from patient, relative(s), past medical records, and ER records.     Subjective:     Principal Problem:Lumbar back pain    Chief Complaint:   Chief Complaint   Patient presents with    Back Pain     C/o Back pain. Sent from  for further evaluation of uti. +weakness per pt. Denies hx uti        HPI: Leslie Wood is a 83 y.o. female with a PMH  has a past medical history of Arthritis, Cataract, GERD (gastroesophageal reflux disease), Heart attack (05/18/2022), Heart attack (05/23/2022), Hyperlipidemia, Hypertension, and Stroke. who presented to the ED directed by urgent care accompanied by her granddaughter for further evaluation of lower back pain for further evaluation after recent diagnosis of UTI.  Patient reports chronic lumbar back pain in his significant surgical history but stated her pain is slightly worse than her usual baseline as well as complaining of generalized weakness and diffuse abdominal pain worse in her epigastric region described as hurting in nature, intermittent, alleviated with immobility and rest but aggravated with movement and palpation to the affected area.  She reported no association with p.o. intake and denied endorsing any lightheadedness, dizziness, headache, visual changes, fever, chills, sweats, nausea, vomiting, chest pain, shortness of breath, dysuria, hematuria, melena, hematochezia, diarrhea, however did report increased constipation.  Prior to onset of symptoms, patient reported being in her usual state of health with no other concerns or complaints.  All other review of systems negative except as noted above.  Patient initially being discharged from ED with continued treatment of  UTI prior to patient developing acute epigastric abdominal pain in his being admitted to Hospital Medicine inpatient for continued cardiac rule out given elevated heart score.  Cardiology consulted and awaiting further evaluation/recommendations.  Cardiology consulted and awaiting further evaluation/recommendations.      PCP: No, Primary Doctor      Past Medical History:   Diagnosis Date    Arthritis     Cataract     GERD (gastroesophageal reflux disease)     Heart attack 05/18/2022    Heart attack 05/23/2022    Hyperlipidemia     Hypertension     Stroke        Past Surgical History:   Procedure Laterality Date    ADENOIDECTOMY      ADRENAL GLAND SURGERY      APPENDECTOMY      BACK SURGERY      fusion l 4-5 s 1,2,3  fusion l 2-3    CORONARY ANGIOGRAPHY N/A 05/20/2022    Procedure: ANGIOGRAM, CORONARY ARTERY;  Surgeon: Domingo Martins MD;  Location: [facility] CATH LAB;  Service: Cardiology;  Laterality: N/A;    CORONARY ANGIOGRAPHY N/A 05/24/2022    Procedure: ANGIOGRAM, CORONARY ARTERY;  Surgeon: Domingo Martins MD;  Location: [facility] CATH LAB;  Service: Cardiology;  Laterality: N/A;    EYE SURGERY      HEMORRHOID SURGERY      HERNIA REPAIR      HYSTERECTOMY      partial    indirect lumbar decompression      percutaneous placement of interspinous extension blocker    LEFT HEART CATHETERIZATION Left 05/20/2022    Procedure: CATHETERIZATION, HEART, LEFT;  Surgeon: Domingo Martins MD;  Location: [facility] CATH LAB;  Service: Cardiology;  Laterality: Left;    TONSILLECTOMY         Review of patient's allergies indicates:   Allergen Reactions    Amitriptyline     Hydrochlorothiazide      Causes muscle cramping    Lisinopril      hyperkalemia    Oxycodone     Percocet [oxycodone-acetaminophen] Other (See Comments)     Seizures    Belbuca [buprenorphine hcl] Nausea And Vomiting and Other (See Comments)     Black out     Codeine Nausea Only and Rash    Prazosin Other (See Comments)     dizziness       No current  facility-administered medications on file prior to encounter.     Current Outpatient Medications on File Prior to Encounter   Medication Sig    aspirin (ECOTRIN) 81 MG EC tablet Take 1 tablet (81 mg total) by mouth once daily.    atorvastatin (LIPITOR) 10 MG tablet Take 1 tablet (10 mg total) by mouth every evening.    DULoxetine (CYMBALTA) 30 MG capsule Take 30 mg by mouth once daily.    furosemide (LASIX) 20 MG tablet TAKE 1 TABLET BY MOUTH ONCE A DAY    losartan (COZAAR) 50 MG tablet TAKE 1 TABLET BY MOUTH TWICE DAILY    metoprolol succinate (TOPROL-XL) 25 MG 24 hr tablet Take 1 tablet (25 mg total) by mouth once daily.    mupirocin (BACTROBAN) 2 % ointment Apply topically 2 (two) times daily.    NUCYNTA 50 mg Tab Take 1 tablet (50 mg total) by mouth every 8 (eight) hours as needed (severe pain). RESTART WHEN OK PER PAIN MANAGEMENT PROVIDER    tiZANidine (ZANAFLEX) 4 MG tablet     [DISCONTINUED] carvediloL (COREG) 6.25 MG tablet Take 1 tablet (6.25 mg total) by mouth 2 (two) times daily. TAKE (1) TABLET BY MOUTH 2 TIMES A DAY WITH MEALS    [DISCONTINUED] cloNIDine (CATAPRES) 0.1 MG tablet Take 1 tablet (0.1 mg total) by mouth 2 (two) times daily. To take an extra dose if BP >160/90    [DISCONTINUED] diclofenac sodium (VOLTAREN) 1 % Gel Apply 2 g topically 3 (three) times daily.    [DISCONTINUED] isosorbide mononitrate (IMDUR) 30 MG 24 hr tablet TAKE 1 TABLET BY MOUTH TWICE A DAY    [DISCONTINUED] metoprolol tartrate (LOPRESSOR) 25 MG tablet Take 1 tablet (25 mg total) by mouth 3 (three) times daily.    [DISCONTINUED] nitroGLYCERIN (NITROSTAT) 0.4 MG SL tablet Place 1 tablet (0.4 mg total) under the tongue every 5 (five) minutes as needed for Chest pain.    [DISCONTINUED] valsartan (DIOVAN) 160 MG tablet TAKE 1 TABLET BY MOUTH TWICE A DAY     Family History       Problem Relation (Age of Onset)    Breast cancer Sister    Diabetes Mother    Heart disease Mother    Hypertension Mother, Father    Kidney disease  Father          Tobacco Use    Smoking status: Never    Smokeless tobacco: Never   Substance and Sexual Activity    Alcohol use: No    Drug use: No    Sexual activity: Not Currently     Review of Systems   All other systems reviewed and are negative.    Objective:     Vital Signs (Most Recent):  Temp: 98.1 °F (36.7 °C) (04/04/24 0046)  Pulse: (!) 54 (04/04/24 0046)  Resp: 18 (04/04/24 0046)  BP: (!) 170/73 (04/04/24 0046)  SpO2: 97 % (04/04/24 0046) Vital Signs (24h Range):  Temp:  [98.1 °F (36.7 °C)-98.2 °F (36.8 °C)] 98.1 °F (36.7 °C)  Pulse:  [54-66] 54  Resp:  [16-18] 18  SpO2:  [97 %-98 %] 97 %  BP: ()/(56-94) 170/73     Weight: 61 kg (134 lb 7.7 oz)  Body mass index is 25.41 kg/m².     Physical Exam  Vitals reviewed.   Constitutional:       General: She is not in acute distress.     Appearance: Normal appearance. She is normal weight. She is not ill-appearing, toxic-appearing or diaphoretic.   HENT:      Head: Normocephalic and atraumatic.      Right Ear: External ear normal.      Left Ear: External ear normal.      Nose: Nose normal. No congestion or rhinorrhea.      Mouth/Throat:      Mouth: Mucous membranes are moist.      Pharynx: Oropharynx is clear. No oropharyngeal exudate or posterior oropharyngeal erythema.   Eyes:      General: No scleral icterus.     Extraocular Movements: Extraocular movements intact.      Conjunctiva/sclera: Conjunctivae normal.      Pupils: Pupils are equal, round, and reactive to light.   Neck:      Vascular: No carotid bruit.   Cardiovascular:      Rate and Rhythm: Regular rhythm. Bradycardia present.      Pulses: Normal pulses.      Heart sounds: Normal heart sounds. No murmur heard.     No friction rub. No gallop.   Pulmonary:      Effort: Pulmonary effort is normal. No respiratory distress.      Breath sounds: Normal breath sounds. No stridor. No wheezing, rhonchi or rales.   Chest:      Chest wall: No tenderness.   Abdominal:      General: Abdomen is flat. Bowel  sounds are normal. There is no distension.      Palpations: Abdomen is soft.      Tenderness: There is abdominal tenderness. There is no right CVA tenderness, left CVA tenderness, guarding or rebound.      Hernia: No hernia is present.      Comments: TTP throughout greatest along epigastric region without evidence of guarding or rebound tenderness noted.   Musculoskeletal:         General: Tenderness present. No swelling, deformity or signs of injury. Normal range of motion.      Cervical back: Normal range of motion and neck supple. No rigidity or tenderness.      Right lower leg: No edema.      Left lower leg: No edema.      Comments: TTP throughout lumbar spine without evidence of crepitus or step-offs noted   Lymphadenopathy:      Cervical: No cervical adenopathy.   Skin:     General: Skin is warm and dry.      Capillary Refill: Capillary refill takes less than 2 seconds.      Coloration: Skin is not jaundiced or pale.      Findings: No bruising, erythema, lesion or rash.   Neurological:      General: No focal deficit present.      Mental Status: She is alert and oriented to person, place, and time. Mental status is at baseline.      Cranial Nerves: No cranial nerve deficit.      Sensory: No sensory deficit.      Motor: No weakness.      Coordination: Coordination normal.   Psychiatric:         Mood and Affect: Mood normal.         Behavior: Behavior normal.         Thought Content: Thought content normal.         Judgment: Judgment normal.              CRANIAL NERVES     CN III, IV, VI   Pupils are equal, round, and reactive to light.       Significant Labs: All pertinent labs within the past 24 hours have been reviewed.    Significant Imaging: I have reviewed all pertinent imaging results/findings within the past 24 hours.    LABS:  Recent Results (from the past 24 hour(s))   Comprehensive metabolic panel    Collection Time: 04/03/24  3:31 PM   Result Value Ref Range    Sodium 143 136 - 145 mmol/L    Potassium  4.5 3.5 - 5.1 mmol/L    Chloride 107 95 - 110 mmol/L    CO2 24 23 - 29 mmol/L    Glucose 146 (H) 70 - 110 mg/dL    BUN 36 (H) 8 - 23 mg/dL    Creatinine 1.3 0.5 - 1.4 mg/dL    Calcium 9.9 8.7 - 10.5 mg/dL    Total Protein 7.4 6.0 - 8.4 g/dL    Albumin 3.9 3.5 - 5.2 g/dL    Total Bilirubin 0.6 0.1 - 1.0 mg/dL    Alkaline Phosphatase 91 55 - 135 U/L    AST 27 10 - 40 U/L    ALT 19 10 - 44 U/L    eGFR 40 (A) >60 mL/min/1.73 m^2    Anion Gap 12 8 - 16 mmol/L   CBC auto differential    Collection Time: 04/03/24  4:09 PM   Result Value Ref Range    WBC 8.01 3.90 - 12.70 K/uL    RBC 4.93 4.00 - 5.40 M/uL    Hemoglobin 14.7 12.0 - 16.0 g/dL    Hematocrit 46.5 37.0 - 48.5 %    MCV 94 82 - 98 fL    MCH 29.8 27.0 - 31.0 pg    MCHC 31.6 (L) 32.0 - 36.0 g/dL    RDW 12.6 11.5 - 14.5 %    Platelets 248 150 - 450 K/uL    MPV 9.6 9.2 - 12.9 fL    Immature Granulocytes 0.4 0.0 - 0.5 %    Gran # (ANC) 4.8 1.8 - 7.7 K/uL    Immature Grans (Abs) 0.03 0.00 - 0.04 K/uL    Lymph # 2.2 1.0 - 4.8 K/uL    Mono # 0.8 0.3 - 1.0 K/uL    Eos # 0.2 0.0 - 0.5 K/uL    Baso # 0.05 0.00 - 0.20 K/uL    nRBC 0 0 /100 WBC    Gran % 59.4 38.0 - 73.0 %    Lymph % 27.5 18.0 - 48.0 %    Mono % 10.1 4.0 - 15.0 %    Eosinophil % 2.0 0.0 - 8.0 %    Basophil % 0.6 0.0 - 1.9 %    Differential Method Automated    Lactic acid, plasma #1    Collection Time: 04/03/24  4:09 PM   Result Value Ref Range    Lactate (Lactic Acid) 1.5 0.5 - 2.2 mmol/L   Procalcitonin    Collection Time: 04/03/24  4:09 PM   Result Value Ref Range    Procalcitonin <0.02 <0.25 ng/mL   Urinalysis, Reflex to Urine Culture Urine, Clean Catch    Collection Time: 04/03/24  4:14 PM    Specimen: Urine   Result Value Ref Range    Specimen UA Urine, Clean Catch     Color, UA Yellow Yellow, Straw, Izabela    Appearance, UA Clear Clear    pH, UA 5.0 5.0 - 8.0    Specific Gravity, UA 1.015 1.005 - 1.030    Protein, UA Negative Negative    Glucose, UA Negative Negative    Ketones, UA Negative Negative     Bilirubin (UA) Negative Negative    Occult Blood UA Negative Negative    Nitrite, UA Positive (A) Negative    Urobilinogen, UA Negative <2.0 EU/dL    Leukocytes, UA 2+ (A) Negative   Urinalysis Microscopic    Collection Time: 04/03/24  4:14 PM   Result Value Ref Range    RBC, UA 3 0 - 4 /hpf    WBC, UA 1 0 - 5 /hpf    Bacteria Rare None-Occ /hpf    Squam Epithel, UA 2 /hpf    Hyaline Casts, UA 26 (A) 0-1/lpf /lpf    Unclass Elly UA Rare None-Moderate    Microscopic Comment SEE COMMENT    Lactic acid, plasma #2    Collection Time: 04/03/24  7:14 PM   Result Value Ref Range    Lactate (Lactic Acid) 0.9 0.5 - 2.2 mmol/L   Troponin I #1    Collection Time: 04/03/24  7:14 PM   Result Value Ref Range    Troponin I 0.008 0.000 - 0.026 ng/mL   Basic metabolic panel    Collection Time: 04/03/24 10:08 PM   Result Value Ref Range    Sodium 141 136 - 145 mmol/L    Potassium 4.3 3.5 - 5.1 mmol/L    Chloride 105 95 - 110 mmol/L    CO2 22 (L) 23 - 29 mmol/L    Glucose 111 (H) 70 - 110 mg/dL    BUN 38 (H) 8 - 23 mg/dL    Creatinine 1.2 0.5 - 1.4 mg/dL    Calcium 9.9 8.7 - 10.5 mg/dL    Anion Gap 14 8 - 16 mmol/L    eGFR 44 (A) >60 mL/min/1.73 m^2   CBC auto differential    Collection Time: 04/03/24 10:08 PM   Result Value Ref Range    WBC 11.01 3.90 - 12.70 K/uL    RBC 4.89 4.00 - 5.40 M/uL    Hemoglobin 14.7 12.0 - 16.0 g/dL    Hematocrit 46.1 37.0 - 48.5 %    MCV 94 82 - 98 fL    MCH 30.1 27.0 - 31.0 pg    MCHC 31.9 (L) 32.0 - 36.0 g/dL    RDW 12.6 11.5 - 14.5 %    Platelets 249 150 - 450 K/uL    MPV 9.5 9.2 - 12.9 fL    Immature Granulocytes 0.5 0.0 - 0.5 %    Gran # (ANC) 6.2 1.8 - 7.7 K/uL    Immature Grans (Abs) 0.05 (H) 0.00 - 0.04 K/uL    Lymph # 3.1 1.0 - 4.8 K/uL    Mono # 1.4 (H) 0.3 - 1.0 K/uL    Eos # 0.2 0.0 - 0.5 K/uL    Baso # 0.06 0.00 - 0.20 K/uL    nRBC 0 0 /100 WBC    Gran % 56.4 38.0 - 73.0 %    Lymph % 28.2 18.0 - 48.0 %    Mono % 12.8 4.0 - 15.0 %    Eosinophil % 1.6 0.0 - 8.0 %    Basophil % 0.5 0.0 - 1.9 %     Differential Method Automated    Type and Screen    Collection Time: 04/03/24 10:08 PM   Result Value Ref Range    Group & Rh O POS     Indirect Moraima NEG     Specimen Outdate 04/06/2024 23:59    Brain natriuretic peptide - if not done in ER    Collection Time: 04/03/24 10:08 PM   Result Value Ref Range    BNP 37 0 - 99 pg/mL   Troponin I    Collection Time: 04/03/24 10:08 PM   Result Value Ref Range    Troponin I 0.008 0.000 - 0.026 ng/mL       RADIOLOGY  X-Ray Lumbar Spine Ap And Lateral    Result Date: 4/4/2024  EXAMINATION: XR LUMBAR SPINE AP AND LATERAL CLINICAL HISTORY: back pain; TECHNIQUE: AP, lateral and spot images were performed of the lumbar spine. COMPARISON: 01/10/2024 FINDINGS: There is postoperative change of prior posterior instrumented fusion at L3-L4.  Hardware appears intact.  Lumbar spine alignment appears stable from prior examination.  Lumbar vertebral body heights appear adequately maintained without definite acute displaced fracture.  There is a chronic compression deformity of the T10 vertebral body status post prior vertebroplasty.  There is intervertebral disc space height loss, most pronounced at L2-L3.  Spinal stimulator device is present, similar to prior examination.  Multiple postsurgical changes noted of the abdomen.     As above. Electronically signed by: Josue Zafar MD Date:    04/04/2024 Time:    01:06    CT Abdomen Pelvis  Without Contrast    Result Date: 4/4/2024  EXAMINATION: CT ABDOMEN PELVIS WITHOUT CONTRAST CLINICAL HISTORY: Abdominal pain, acute, nonlocalized;Bowel obstruction suspected; TECHNIQUE: Low dose axial images, sagittal and coronal reformations were obtained from the lung bases to the pubic symphysis without IV contrast.  Oral contrast was not administered. COMPARISON: CTA chest 05/11/2017 FINDINGS: There is no confluent airspace consolidation of the visualized lung bases.  There are a few scattered coronary artery calcifications present. The liver is not  significantly enlarged.  There is a calcified hepatic granuloma.  Further assessment of hepatic parenchyma is limited without IV contrast.  The gallbladder is surgically absent.  There is stable mild dilatation of the extrahepatic common bile duct which may relate to post cholecystectomy status. Stomach is nondistended.  The spleen and pancreas appear within normal limits.  There are apparent postsurgical change of the left adrenal gland.  The right adrenal gland is unremarkable.  There is a 4.4 cm fat attenuation lesion with faint peripheral calcification within the anterior abdomen, possibly a lipoma.  This demonstrates mild mass effect upon the underlying left hepatic lobe.  No significant surrounding inflammatory change identified. Kidneys are normal in size and location with mild bilateral renal cortical thinning.  There is no evidence of hydronephrosis.  There is mild nonspecific bilateral perinephric edema.  There is a punctate nonobstructing left renal calculus.  There are several left renal cysts measuring up to 6.4 cm.  No definite obstructing ureteral calculus identified.  The urinary bladder appears within normal limits.  The uterus is surgically absent. The abdominal aorta is normal in course and caliber with mild atherosclerotic calcification along its course.  There is no retroperitoneal hematoma. The visualized loops of small and large bowel show no evidence of obstruction or inflammation.  There is mild retained stool throughout the colon.  There is colonic diverticulosis without evidence of acute diverticulitis.  The appendix is not definitively visualized.  There is no ascites, portal venous gas, or free intraperitoneal air.  There are remote postoperative changes of the anterior abdominal wall. There are remote postoperative changes of the lumbar spine.  There is a chronic T10 compression deformity status post prior vertebroplasty.  There is a spinal stimulator device within the right gluteal  subcutaneous soft tissues with partially visualized leads in the thoracic spinal canal. Small focus of subcutaneous emphysema within the left anterior abdominal may relate to recent injection site.  Clinical correlation advised.     No CT evidence of acute intra-abdominal abnormality. Nonobstructing left renal calculus.  No evidence of hydronephrosis or obstructing ureteral calculus. Multiple left renal cysts. Mild retained stool throughout the colon.  Colonic diverticulosis without evidence of acute diverticulitis. Redemonstration of oval fat attenuation lesion within the anterior abdomen with mild localized mass effect, possibly a lipoma. Cholecystectomy with stable mild dilatation extrahepatic common bile duct. Additional findings as above. Electronically signed by: Josue Zafar MD Date:    04/04/2024 Time:    00:41    X-ray Chest PA And Lateral    Result Date: 4/3/2024  EXAMINATION: XR CHEST PA AND LATERAL TECHNIQUE: PA and lateral views of the chest were performed. COMPARISON: 02/01/2023 FINDINGS: Extraneous artifact present.  Lungs well aerated and no sizable pleural effusion     No active pulmonary finding Electronically signed by: Milla Lo Date:    04/03/2024 Time:    21:55      EKG    MICROBIOLOGY    [facility]    Assessment/Plan:     * Lumbar back pain  Patient presented with acute on chronic lumbar back pain with significant surgical history including fusion with retained hardware.  Patient negative for recent falls/trauma, bowel/bladder incontinence, or neurological deficits.  Lumbar x-ray negative for acute findings and showed known postoperative changes similar to previous findings.  Plan:  -optimize pain control  -bowel regimen  -continued PT/OT      Abdominal pain  Patient complaining of diffuse abdominal pain in setting of known constipation.  Patient remains afebrile without leukocytosis with LFTs within normal limits.  Patient with diffuse abdominal tenderness on physical exam without  evidence of guarding or rebound tenderness noted.  CT abdomen/pelvis positive for mild stool within colon in addition to abdominal lipoma and presence of nonobstructing calculus and multiple calculi noted.  Plan:  -optimize pain control  -bowel regimen      Acute cystitis  Patient found to have evidence of UTI on UA initiated on Rocephin.  Plan:  -continue antibiotics  -f/u cultures      Atypical chest pain  Patient presented with acute onset epigastric abdominal pain concerning for atypical chest pain.  Initial troponin negative with EKG negative for evidence of acute ischemia.  Chest x-ray negative for acute findings.  Patient with significant history including prior MI with elevated heart score.  Patient admitted to telemetry for continued cardiac monitoring and ACS rule out.  Plan:  -telemetry  -continue home medications  -trend troponin  -serial EKGs at onset or worsening chest pain  -MELI therapy p.r.n.  -f/u cardiology      Hypertension associated with diabetes  Chronic, uncontrolled. Latest blood pressure and vitals reviewed-     Temp:  [98.1 °F (36.7 °C)-98.2 °F (36.8 °C)]   Pulse:  [54-66]   Resp:  [16-18]   BP: ()/(56-94)   SpO2:  [97 %-98 %] .   Home meds for hypertension were reviewed and noted below.   Hypertension Medications               furosemide (LASIX) 20 MG tablet TAKE 1 TABLET BY MOUTH ONCE A DAY    losartan (COZAAR) 50 MG tablet TAKE 1 TABLET BY MOUTH TWICE DAILY    metoprolol succinate (TOPROL-XL) 25 MG 24 hr tablet Take 1 tablet (25 mg total) by mouth once daily.     While in the hospital, will manage blood pressure as follows; Continue home antihypertensive regimen    Will utilize p.r.n. blood pressure medication only if patient's blood pressure greater than 160/100 and she develops symptoms such as worsening chest pain or shortness of breath.      Prediabetes  Patient's FSGs are controlled on current medication regimen.  Last A1c reviewed-   Lab Results   Component Value Date     "HGBA1C 5.6 11/08/2023     Most recent fingerstick glucose reviewed- No results for input(s): "POCTGLUCOSE" in the last 24 hours.  Current correctional scale  Low  titrate as needed  anti-hyperglycemic dose as follows-   Antihyperglycemics (From admission, onward)      None        Plan:  -SSI  -A1c  -Accu-checks  -Hold oral hypoglycemics while patient is in the hospital  -Hypoglycemic protocol        Anxiety and depression  Chronic. Stable. Not in acute exacerbation and currently denies endorsing any suicidal/homicidal ideations.   Plan:  -Continue home medications       Hyperlipidemia LDL goal <100  Patient is chronically on statin.will continue for now. Last Lipid Panel:   Lab Results   Component Value Date    CHOL 139 11/08/2023    HDL 45 11/08/2023    LDLCALC 64.8 11/08/2023    TRIG 146 11/08/2023    CHOLHDL 32.4 11/08/2023   Plan:  -Continue home medication  -low fat/low calorie diet      Hemiplegia and hemiparesis following cerebral infarction affecting left non-dominant side  Currently at neurological baseline.  Plan:  -continue home medications  -continued PT/OT        VTE Risk Mitigation (From admission, onward)           Ordered     enoxaparin injection 30 mg  Daily         04/03/24 2119     IP VTE HIGH RISK PATIENT  Once         04/03/24 2117     Place sequential compression device  Until discontinued         04/03/24 2117                  //Core Measures   -DVT proph: SCDs, withholding anticoagulation pending surgical intervention   -Code status: DNR    -Surrogate:  Granddaughter      Components of this note were documented using a voice recognition system and are subject to errors not corrected at the time the document was proof read. Please contact the author for any clarifications.       Pharmacist Renal Dose Adjustment Note    Leslie Wood is a 83 y.o. female being treated with the medication enoxaparin.     Patient Data:    Vital Signs (Most Recent):  Temp: 98.2 °F (36.8 °C) (04/03/24 2020)  Pulse: " (!) 57 (04/03/24 2020)  Resp: 18 (04/03/24 2020)  BP: (!) 182/94 (04/03/24 2020)  SpO2: 97 % (04/03/24 2020) Vital Signs (72h Range):  Temp:  [98.1 °F (36.7 °C)-98.2 °F (36.8 °C)]   Pulse:  [56-66]   Resp:  [16-18]   BP: ()/(56-94)   SpO2:  [97 %-98 %]      Recent Labs   Lab 04/03/24  1531   CREATININE 1.3     Serum creatinine: 1.3 mg/dL 04/03/24 1531  Estimated creatinine clearance: 26.5 mL/min    Medication: enoxaparin 40 mg SQ daily will be changed to enoxaparin 30 mg SQ daily per pharmacy renal dose adjustment protocol for patients with CrCl less than 30 mL/min.    Pharmacist's Name: Megan Castro PharmD  Pharmacist's Extension: [phone]     Thank you for allowing us to participate in this patient's care.     Megan Castro PharmD 04/03/2024 9:25 PM      Jose Enrique Gomez MD  Department of Hospital Medicine  [facility] - Emergency Dept.

## 2024-04-04 NOTE — ED NOTES
Pt resting in ED stretcher comfortably, skin warm and dry, RR even and unlabored. VSS. NADN.   Pt given breakfast tray,sitting in ED stretcher eating food at this time.

## 2024-04-04 NOTE — ASSESSMENT & PLAN NOTE
Patient is chronically on statin.will continue for now. Last Lipid Panel:   Lab Results   Component Value Date    CHOL 139 11/08/2023    HDL 45 11/08/2023    LDLCALC 64.8 11/08/2023    TRIG 146 11/08/2023    CHOLHDL 32.4 11/08/2023   Plan:  -Continue home medication  -low fat/low calorie diet

## 2024-04-04 NOTE — SUBJECTIVE & OBJECTIVE
Past Medical History:   Diagnosis Date    Arthritis     Cataract     GERD (gastroesophageal reflux disease)     Heart attack 05/18/2022    Heart attack 05/23/2022    Hyperlipidemia     Hypertension     Stroke        Past Surgical History:   Procedure Laterality Date    ADENOIDECTOMY      ADRENAL GLAND SURGERY      APPENDECTOMY      BACK SURGERY      fusion l 4-5 s 1,2,3  fusion l 2-3    CORONARY ANGIOGRAPHY N/A 05/20/2022    Procedure: ANGIOGRAM, CORONARY ARTERY;  Surgeon: Domingo Martins MD;  Location: [facility] CATH LAB;  Service: Cardiology;  Laterality: N/A;    CORONARY ANGIOGRAPHY N/A 05/24/2022    Procedure: ANGIOGRAM, CORONARY ARTERY;  Surgeon: Domingo Martins MD;  Location: [facility] CATH LAB;  Service: Cardiology;  Laterality: N/A;    EYE SURGERY      HEMORRHOID SURGERY      HERNIA REPAIR      HYSTERECTOMY      partial    indirect lumbar decompression      percutaneous placement of interspinous extension blocker    LEFT HEART CATHETERIZATION Left 05/20/2022    Procedure: CATHETERIZATION, HEART, LEFT;  Surgeon: Domingo Martins MD;  Location: [facility] CATH LAB;  Service: Cardiology;  Laterality: Left;    TONSILLECTOMY         Review of patient's allergies indicates:   Allergen Reactions    Amitriptyline     Hydrochlorothiazide      Causes muscle cramping    Lisinopril      hyperkalemia    Oxycodone     Percocet [oxycodone-acetaminophen] Other (See Comments)     Seizures    Belbuca [buprenorphine hcl] Nausea And Vomiting and Other (See Comments)     Black out     Codeine Nausea Only and Rash    Prazosin Other (See Comments)     dizziness       No current facility-administered medications on file prior to encounter.     Current Outpatient Medications on File Prior to Encounter   Medication Sig    aspirin (ECOTRIN) 81 MG EC tablet Take 1 tablet (81 mg total) by mouth once daily.    atorvastatin (LIPITOR) 10 MG tablet Take 1 tablet (10 mg total) by mouth every evening.    DULoxetine (CYMBALTA) 30 MG capsule Take 30 mg by  mouth once daily.    furosemide (LASIX) 20 MG tablet TAKE 1 TABLET BY MOUTH ONCE A DAY    losartan (COZAAR) 50 MG tablet TAKE 1 TABLET BY MOUTH TWICE DAILY    metoprolol succinate (TOPROL-XL) 25 MG 24 hr tablet Take 1 tablet (25 mg total) by mouth once daily.    mupirocin (BACTROBAN) 2 % ointment Apply topically 2 (two) times daily.    NUCYNTA 50 mg Tab Take 1 tablet (50 mg total) by mouth every 8 (eight) hours as needed (severe pain). RESTART WHEN OK PER PAIN MANAGEMENT PROVIDER    tiZANidine (ZANAFLEX) 4 MG tablet     [DISCONTINUED] carvediloL (COREG) 6.25 MG tablet Take 1 tablet (6.25 mg total) by mouth 2 (two) times daily. TAKE (1) TABLET BY MOUTH 2 TIMES A DAY WITH MEALS    [DISCONTINUED] cloNIDine (CATAPRES) 0.1 MG tablet Take 1 tablet (0.1 mg total) by mouth 2 (two) times daily. To take an extra dose if BP >160/90    [DISCONTINUED] diclofenac sodium (VOLTAREN) 1 % Gel Apply 2 g topically 3 (three) times daily.    [DISCONTINUED] isosorbide mononitrate (IMDUR) 30 MG 24 hr tablet TAKE 1 TABLET BY MOUTH TWICE A DAY    [DISCONTINUED] metoprolol tartrate (LOPRESSOR) 25 MG tablet Take 1 tablet (25 mg total) by mouth 3 (three) times daily.    [DISCONTINUED] nitroGLYCERIN (NITROSTAT) 0.4 MG SL tablet Place 1 tablet (0.4 mg total) under the tongue every 5 (five) minutes as needed for Chest pain.    [DISCONTINUED] valsartan (DIOVAN) 160 MG tablet TAKE 1 TABLET BY MOUTH TWICE A DAY     Family History       Problem Relation (Age of Onset)    Breast cancer Sister    Diabetes Mother    Heart disease Mother    Hypertension Mother, Father    Kidney disease Father          Tobacco Use    Smoking status: Never    Smokeless tobacco: Never   Substance and Sexual Activity    Alcohol use: No    Drug use: No    Sexual activity: Not Currently     Review of Systems   Constitutional: Positive for malaise/fatigue.   HENT: Negative.     Eyes: Negative.    Cardiovascular: Negative.    Respiratory: Negative.     Endocrine: Negative.     Hematologic/Lymphatic: Negative.    Skin: Negative.    Musculoskeletal:  Positive for back pain.   Gastrointestinal:  Positive for abdominal pain (epigastric).   Genitourinary: Negative.    Neurological:  Positive for weakness.   Psychiatric/Behavioral: Negative.     Allergic/Immunologic: Negative.      Objective:     Vital Signs (Most Recent):  Temp: 98.1 °F (36.7 °C) (04/04/24 0046)  Pulse: (!) 55 (04/04/24 0633)  Resp: 18 (04/04/24 0046)  BP: (!) 180/77 (04/04/24 0827)  SpO2: 96 % (04/04/24 0633) Vital Signs (24h Range):  Temp:  [98.1 °F (36.7 °C)-98.2 °F (36.8 °C)] 98.1 °F (36.7 °C)  Pulse:  [54-66] 55  Resp:  [16-18] 18  SpO2:  [96 %-98 %] 96 %  BP: ()/(56-94) 180/77     Weight: 61 kg (134 lb 7.7 oz)  Body mass index is 25.41 kg/m².    SpO2: 96 %         Intake/Output Summary (Last 24 hours) at 4/4/2024 1101  Last data filed at 4/3/2024 2050  Gross per 24 hour   Intake 100 ml   Output --   Net 100 ml       Lines/Drains/Airways       Peripheral Intravenous Line  Duration                  Peripheral IV - Single Lumen 04/03/24 0000 Right Antecubital 1 day                     Physical Exam  Vitals and nursing note reviewed.   Constitutional:       General: She is not in acute distress.     Appearance: Normal appearance. She is well-developed. She is not diaphoretic.   HENT:      Head: Normocephalic and atraumatic.   Eyes:      General:         Right eye: No discharge.         Left eye: No discharge.      Pupils: Pupils are equal, round, and reactive to light.   Neck:      Thyroid: No thyromegaly.      Vascular: No JVD.      Trachea: No tracheal deviation.   Cardiovascular:      Rate and Rhythm: Regular rhythm. Bradycardia present.      Heart sounds: Normal heart sounds, S1 normal and S2 normal. No murmur heard.  Pulmonary:      Effort: Pulmonary effort is normal. No respiratory distress.      Breath sounds: Normal breath sounds.   Abdominal:      General: There is no distension.      Tenderness: There is  abdominal tenderness. Guarding: epigastric.   Musculoskeletal:      Cervical back: Neck supple.      Right lower leg: No edema.      Left lower leg: No edema.   Skin:     General: Skin is warm and dry.      Findings: No erythema.   Neurological:      Mental Status: She is alert and oriented to person, place, and time.   Psychiatric:         Mood and Affect: Mood normal.         Behavior: Behavior normal.          Significant Labs: CMP   Recent Labs   Lab 04/03/24  1531 04/03/24  2208    141   K 4.5 4.3    105   CO2 24 22*   * 111*   BUN 36* 38*   CREATININE 1.3 1.2   CALCIUM 9.9 9.9   PROT 7.4  --    ALBUMIN 3.9  --    BILITOT 0.6  --    ALKPHOS 91  --    AST 27  --    ALT 19  --    ANIONGAP 12 14   , CBC   Recent Labs   Lab 04/03/24  1609 04/03/24  2208   WBC 8.01 11.01   HGB 14.7 14.7   HCT 46.5 46.1    249   , Troponin   Recent Labs   Lab 04/03/24  1914 04/03/24  2208   TROPONINI 0.008 0.008   , and All pertinent lab results from the last 24 hours have been reviewed.    Significant Imaging: Echocardiogram: Transthoracic echo (TTE) complete (Cupid Only):   Results for orders placed or performed during the hospital encounter of 12/15/22   Echo   Result Value Ref Range    BSA 1.63 m2    LA WIDTH 3.20 cm    IVC diameter 1.31 cm    Left Ventricular Outflow Tract Mean Velocity 0.74 cm/s    Left Ventricular Outflow Tract Mean Gradient 2.35 mmHg    LVIDd 3.59 3.5 - 6.0 cm    IVS 1.08 0.6 - 1.1 cm    Posterior Wall 1.02 0.6 - 1.1 cm    LVIDs 2.49 2.1 - 4.0 cm    FS 31 28 - 44 %    STJ 2.97 cm    Ascending aorta 3.10 cm    LV mass 115.38 g    LA size 3.31 cm    RVDD 3.34 cm    TAPSE 2.10 cm    Left Ventricle Relative Wall Thickness 0.57 cm    AV regurgitation pressure 1/2 time [id number] ms    AV mean gradient 5 mmHg    AV valve area 1.85 cm2    AV Velocity Ratio 0.64     AV index (prosthetic) 0.75     MV valve area p 1/2 method 3.24 cm2    E/A ratio 0.99     E wave deceleration time  234.23 msec    IVRT 99.90 msec    LVOT diameter 1.77 cm    LVOT area 2.5 cm2    LVOT peak mehrdad 0.91 m/s    LVOT peak VTI 31.80 cm    Ao peak mehrdad 1.42 m/s    Ao VTI 42.3 cm    RVOT peak mehrdad 0.65 m/s    RVOT peak VTI 21.5 cm    LVOT stroke volume 78.21 cm3    AV peak gradient 8 mmHg    PV mean gradient 1.17 mmHg    MV Peak E Mehrdad 0.77 m/s    AR Max Mehrdad 4.54 m/s    TR Max Mehrdad 3.50 m/s    MV stenosis pressure 1/2 time 67.93 ms    MV Peak A Mehrdad 0.78 m/s    LV Systolic Volume 22.07 mL    LV Systolic Volume Index 13.8 mL/m2    LV Diastolic Volume 54.01 mL    LV Diastolic Volume Index 33.76 mL/m2    LV Mass Index 72 g/m2    RA Major Axis 3.98 cm    Left Atrium Major Axis 4.50 cm    Triscuspid Valve Regurgitation Peak Gradient 49 mmHg    RA Width 2.50 cm    Right Atrial Pressure (from IVC) 3 mmHg    EF 60 %    TV resting pulmonary artery pressure 52 mmHg    Narrative    · The left ventricle is normal in size with concentric remodeling and   normal systolic function.  · The estimated ejection fraction is 60%.  · Normal left ventricular diastolic function.  · Normal right ventricular size with normal right ventricular systolic   function.  · Mild aortic regurgitation.  · There is moderate pulmonary hypertension.  · Normal central venous pressure (3 mmHg).  · The estimated PA systolic pressure is 52 mmHg.       and EKG: Reviewed

## 2024-04-04 NOTE — ASSESSMENT & PLAN NOTE
-Complains of epigastric pain, with tenderness on exam  -Likely related to mild constipation, UTI  -No exertional CP symptoms  -Troponin x 2 negative, EKG essentially normal, no acute ischemic changes  -Prior LHC in 5/22 showed normal coronaries   -No CV workup planned/indicated

## 2024-04-04 NOTE — HPI
Leslie Wood is a 83 y.o. female with a PMH  has a past medical history of Arthritis, Cataract, GERD (gastroesophageal reflux disease), Heart attack (05/18/2022), Heart attack (05/23/2022), Hyperlipidemia, Hypertension, and Stroke. who presented to the ED directed by urgent care accompanied by her granddaughter for further evaluation of lower back pain for further evaluation after recent diagnosis of UTI.  Patient reports chronic lumbar back pain in his significant surgical history but stated her pain is slightly worse than her usual baseline as well as complaining of generalized weakness and diffuse abdominal pain worse in her epigastric region described as hurting in nature, intermittent, alleviated with immobility and rest but aggravated with movement and palpation to the affected area.  She reported no association with p.o. intake and denied endorsing any lightheadedness, dizziness, headache, visual changes, fever, chills, sweats, nausea, vomiting, chest pain, shortness of breath, dysuria, hematuria, melena, hematochezia, diarrhea, however did report increased constipation.  Prior to onset of symptoms, patient reported being in her usual state of health with no other concerns or complaints.  All other review of systems negative except as noted above.  Patient initially being discharged from ED with continued treatment of UTI prior to patient developing acute epigastric abdominal pain in his being admitted to Hospital Medicine inpatient for continued cardiac rule out given elevated heart score.  Cardiology consulted and awaiting further evaluation/recommendations.  Cardiology consulted and awaiting further evaluation/recommendations.      PCP: No, Primary Doctor

## 2024-04-04 NOTE — ASSESSMENT & PLAN NOTE
Patient found to have evidence of UTI on UA initiated on Rocephin.  Plan:  -continue antibiotics  -f/u cultures

## 2024-04-04 NOTE — SUBJECTIVE & OBJECTIVE
Past Medical History:   Diagnosis Date    Arthritis     Cataract     GERD (gastroesophageal reflux disease)     Heart attack 05/18/2022    Heart attack 05/23/2022    Hyperlipidemia     Hypertension     Stroke        Past Surgical History:   Procedure Laterality Date    ADENOIDECTOMY      ADRENAL GLAND SURGERY      APPENDECTOMY      BACK SURGERY      fusion l 4-5 s 1,2,3  fusion l 2-3    CORONARY ANGIOGRAPHY N/A 05/20/2022    Procedure: ANGIOGRAM, CORONARY ARTERY;  Surgeon: Domingo Martins MD;  Location: [facility] CATH LAB;  Service: Cardiology;  Laterality: N/A;    CORONARY ANGIOGRAPHY N/A 05/24/2022    Procedure: ANGIOGRAM, CORONARY ARTERY;  Surgeon: Domingo Martins MD;  Location: [facility] CATH LAB;  Service: Cardiology;  Laterality: N/A;    EYE SURGERY      HEMORRHOID SURGERY      HERNIA REPAIR      HYSTERECTOMY      partial    indirect lumbar decompression      percutaneous placement of interspinous extension blocker    LEFT HEART CATHETERIZATION Left 05/20/2022    Procedure: CATHETERIZATION, HEART, LEFT;  Surgeon: Domingo Martins MD;  Location: [facility] CATH LAB;  Service: Cardiology;  Laterality: Left;    TONSILLECTOMY         Review of patient's allergies indicates:   Allergen Reactions    Amitriptyline     Hydrochlorothiazide      Causes muscle cramping    Lisinopril      hyperkalemia    Oxycodone     Percocet [oxycodone-acetaminophen] Other (See Comments)     Seizures    Belbuca [buprenorphine hcl] Nausea And Vomiting and Other (See Comments)     Black out     Codeine Nausea Only and Rash    Prazosin Other (See Comments)     dizziness       No current facility-administered medications on file prior to encounter.     Current Outpatient Medications on File Prior to Encounter   Medication Sig    aspirin (ECOTRIN) 81 MG EC tablet Take 1 tablet (81 mg total) by mouth once daily.    atorvastatin (LIPITOR) 10 MG tablet Take 1 tablet (10 mg total) by mouth every evening.    DULoxetine (CYMBALTA) 30 MG capsule Take 30 mg by  mouth once daily.    furosemide (LASIX) 20 MG tablet TAKE 1 TABLET BY MOUTH ONCE A DAY    losartan (COZAAR) 50 MG tablet TAKE 1 TABLET BY MOUTH TWICE DAILY    metoprolol succinate (TOPROL-XL) 25 MG 24 hr tablet Take 1 tablet (25 mg total) by mouth once daily.    mupirocin (BACTROBAN) 2 % ointment Apply topically 2 (two) times daily.    NUCYNTA 50 mg Tab Take 1 tablet (50 mg total) by mouth every 8 (eight) hours as needed (severe pain). RESTART WHEN OK PER PAIN MANAGEMENT PROVIDER    tiZANidine (ZANAFLEX) 4 MG tablet     [DISCONTINUED] carvediloL (COREG) 6.25 MG tablet Take 1 tablet (6.25 mg total) by mouth 2 (two) times daily. TAKE (1) TABLET BY MOUTH 2 TIMES A DAY WITH MEALS    [DISCONTINUED] cloNIDine (CATAPRES) 0.1 MG tablet Take 1 tablet (0.1 mg total) by mouth 2 (two) times daily. To take an extra dose if BP >160/90    [DISCONTINUED] diclofenac sodium (VOLTAREN) 1 % Gel Apply 2 g topically 3 (three) times daily.    [DISCONTINUED] isosorbide mononitrate (IMDUR) 30 MG 24 hr tablet TAKE 1 TABLET BY MOUTH TWICE A DAY    [DISCONTINUED] metoprolol tartrate (LOPRESSOR) 25 MG tablet Take 1 tablet (25 mg total) by mouth 3 (three) times daily.    [DISCONTINUED] nitroGLYCERIN (NITROSTAT) 0.4 MG SL tablet Place 1 tablet (0.4 mg total) under the tongue every 5 (five) minutes as needed for Chest pain.    [DISCONTINUED] valsartan (DIOVAN) 160 MG tablet TAKE 1 TABLET BY MOUTH TWICE A DAY     Family History       Problem Relation (Age of Onset)    Breast cancer Sister    Diabetes Mother    Heart disease Mother    Hypertension Mother, Father    Kidney disease Father          Tobacco Use    Smoking status: Never    Smokeless tobacco: Never   Substance and Sexual Activity    Alcohol use: No    Drug use: No    Sexual activity: Not Currently     Review of Systems   All other systems reviewed and are negative.    Objective:     Vital Signs (Most Recent):  Temp: 98.1 °F (36.7 °C) (04/04/24 0046)  Pulse: (!) 54 (04/04/24 0046)  Resp:  18 (04/04/24 0046)  BP: (!) 170/73 (04/04/24 0046)  SpO2: 97 % (04/04/24 0046) Vital Signs (24h Range):  Temp:  [98.1 °F (36.7 °C)-98.2 °F (36.8 °C)] 98.1 °F (36.7 °C)  Pulse:  [54-66] 54  Resp:  [16-18] 18  SpO2:  [97 %-98 %] 97 %  BP: ()/(56-94) 170/73     Weight: 61 kg (134 lb 7.7 oz)  Body mass index is 25.41 kg/m².     Physical Exam  Vitals reviewed.   Constitutional:       General: She is not in acute distress.     Appearance: Normal appearance. She is normal weight. She is not ill-appearing, toxic-appearing or diaphoretic.   HENT:      Head: Normocephalic and atraumatic.      Right Ear: External ear normal.      Left Ear: External ear normal.      Nose: Nose normal. No congestion or rhinorrhea.      Mouth/Throat:      Mouth: Mucous membranes are moist.      Pharynx: Oropharynx is clear. No oropharyngeal exudate or posterior oropharyngeal erythema.   Eyes:      General: No scleral icterus.     Extraocular Movements: Extraocular movements intact.      Conjunctiva/sclera: Conjunctivae normal.      Pupils: Pupils are equal, round, and reactive to light.   Neck:      Vascular: No carotid bruit.   Cardiovascular:      Rate and Rhythm: Regular rhythm. Bradycardia present.      Pulses: Normal pulses.      Heart sounds: Normal heart sounds. No murmur heard.     No friction rub. No gallop.   Pulmonary:      Effort: Pulmonary effort is normal. No respiratory distress.      Breath sounds: Normal breath sounds. No stridor. No wheezing, rhonchi or rales.   Chest:      Chest wall: No tenderness.   Abdominal:      General: Abdomen is flat. Bowel sounds are normal. There is no distension.      Palpations: Abdomen is soft.      Tenderness: There is abdominal tenderness. There is no right CVA tenderness, left CVA tenderness, guarding or rebound.      Hernia: No hernia is present.      Comments: TTP throughout greatest along epigastric region without evidence of guarding or rebound tenderness noted.   Musculoskeletal:          General: Tenderness present. No swelling, deformity or signs of injury. Normal range of motion.      Cervical back: Normal range of motion and neck supple. No rigidity or tenderness.      Right lower leg: No edema.      Left lower leg: No edema.      Comments: TTP throughout lumbar spine without evidence of crepitus or step-offs noted   Lymphadenopathy:      Cervical: No cervical adenopathy.   Skin:     General: Skin is warm and dry.      Capillary Refill: Capillary refill takes less than 2 seconds.      Coloration: Skin is not jaundiced or pale.      Findings: No bruising, erythema, lesion or rash.   Neurological:      General: No focal deficit present.      Mental Status: She is alert and oriented to person, place, and time. Mental status is at baseline.      Cranial Nerves: No cranial nerve deficit.      Sensory: No sensory deficit.      Motor: No weakness.      Coordination: Coordination normal.   Psychiatric:         Mood and Affect: Mood normal.         Behavior: Behavior normal.         Thought Content: Thought content normal.         Judgment: Judgment normal.              CRANIAL NERVES     CN III, IV, VI   Pupils are equal, round, and reactive to light.       Significant Labs: All pertinent labs within the past 24 hours have been reviewed.    Significant Imaging: I have reviewed all pertinent imaging results/findings within the past 24 hours.    LABS:  Recent Results (from the past 24 hour(s))   Comprehensive metabolic panel    Collection Time: 04/03/24  3:31 PM   Result Value Ref Range    Sodium 143 136 - 145 mmol/L    Potassium 4.5 3.5 - 5.1 mmol/L    Chloride 107 95 - 110 mmol/L    CO2 24 23 - 29 mmol/L    Glucose 146 (H) 70 - 110 mg/dL    BUN 36 (H) 8 - 23 mg/dL    Creatinine 1.3 0.5 - 1.4 mg/dL    Calcium 9.9 8.7 - 10.5 mg/dL    Total Protein 7.4 6.0 - 8.4 g/dL    Albumin 3.9 3.5 - 5.2 g/dL    Total Bilirubin 0.6 0.1 - 1.0 mg/dL    Alkaline Phosphatase 91 55 - 135 U/L    AST 27 10 - 40 U/L    ALT  19 10 - 44 U/L    eGFR 40 (A) >60 mL/min/1.73 m^2    Anion Gap 12 8 - 16 mmol/L   CBC auto differential    Collection Time: 04/03/24  4:09 PM   Result Value Ref Range    WBC 8.01 3.90 - 12.70 K/uL    RBC 4.93 4.00 - 5.40 M/uL    Hemoglobin 14.7 12.0 - 16.0 g/dL    Hematocrit 46.5 37.0 - 48.5 %    MCV 94 82 - 98 fL    MCH 29.8 27.0 - 31.0 pg    MCHC 31.6 (L) 32.0 - 36.0 g/dL    RDW 12.6 11.5 - 14.5 %    Platelets 248 150 - 450 K/uL    MPV 9.6 9.2 - 12.9 fL    Immature Granulocytes 0.4 0.0 - 0.5 %    Gran # (ANC) 4.8 1.8 - 7.7 K/uL    Immature Grans (Abs) 0.03 0.00 - 0.04 K/uL    Lymph # 2.2 1.0 - 4.8 K/uL    Mono # 0.8 0.3 - 1.0 K/uL    Eos # 0.2 0.0 - 0.5 K/uL    Baso # 0.05 0.00 - 0.20 K/uL    nRBC 0 0 /100 WBC    Gran % 59.4 38.0 - 73.0 %    Lymph % 27.5 18.0 - 48.0 %    Mono % 10.1 4.0 - 15.0 %    Eosinophil % 2.0 0.0 - 8.0 %    Basophil % 0.6 0.0 - 1.9 %    Differential Method Automated    Lactic acid, plasma #1    Collection Time: 04/03/24  4:09 PM   Result Value Ref Range    Lactate (Lactic Acid) 1.5 0.5 - 2.2 mmol/L   Procalcitonin    Collection Time: 04/03/24  4:09 PM   Result Value Ref Range    Procalcitonin <0.02 <0.25 ng/mL   Urinalysis, Reflex to Urine Culture Urine, Clean Catch    Collection Time: 04/03/24  4:14 PM    Specimen: Urine   Result Value Ref Range    Specimen UA Urine, Clean Catch     Color, UA Yellow Yellow, Straw, Izabela    Appearance, UA Clear Clear    pH, UA 5.0 5.0 - 8.0    Specific Gravity, UA 1.015 1.005 - 1.030    Protein, UA Negative Negative    Glucose, UA Negative Negative    Ketones, UA Negative Negative    Bilirubin (UA) Negative Negative    Occult Blood UA Negative Negative    Nitrite, UA Positive (A) Negative    Urobilinogen, UA Negative <2.0 EU/dL    Leukocytes, UA 2+ (A) Negative   Urinalysis Microscopic    Collection Time: 04/03/24  4:14 PM   Result Value Ref Range    RBC, UA 3 0 - 4 /hpf    WBC, UA 1 0 - 5 /hpf    Bacteria Rare None-Occ /hpf    Squam Epithel, UA 2 /hpf     Hyaline Casts, UA 26 (A) 0-1/lpf /lpf    Unclass Elly UA Rare None-Moderate    Microscopic Comment SEE COMMENT    Lactic acid, plasma #2    Collection Time: 04/03/24  7:14 PM   Result Value Ref Range    Lactate (Lactic Acid) 0.9 0.5 - 2.2 mmol/L   Troponin I #1    Collection Time: 04/03/24  7:14 PM   Result Value Ref Range    Troponin I 0.008 0.000 - 0.026 ng/mL   Basic metabolic panel    Collection Time: 04/03/24 10:08 PM   Result Value Ref Range    Sodium 141 136 - 145 mmol/L    Potassium 4.3 3.5 - 5.1 mmol/L    Chloride 105 95 - 110 mmol/L    CO2 22 (L) 23 - 29 mmol/L    Glucose 111 (H) 70 - 110 mg/dL    BUN 38 (H) 8 - 23 mg/dL    Creatinine 1.2 0.5 - 1.4 mg/dL    Calcium 9.9 8.7 - 10.5 mg/dL    Anion Gap 14 8 - 16 mmol/L    eGFR 44 (A) >60 mL/min/1.73 m^2   CBC auto differential    Collection Time: 04/03/24 10:08 PM   Result Value Ref Range    WBC 11.01 3.90 - 12.70 K/uL    RBC 4.89 4.00 - 5.40 M/uL    Hemoglobin 14.7 12.0 - 16.0 g/dL    Hematocrit 46.1 37.0 - 48.5 %    MCV 94 82 - 98 fL    MCH 30.1 27.0 - 31.0 pg    MCHC 31.9 (L) 32.0 - 36.0 g/dL    RDW 12.6 11.5 - 14.5 %    Platelets 249 150 - 450 K/uL    MPV 9.5 9.2 - 12.9 fL    Immature Granulocytes 0.5 0.0 - 0.5 %    Gran # (ANC) 6.2 1.8 - 7.7 K/uL    Immature Grans (Abs) 0.05 (H) 0.00 - 0.04 K/uL    Lymph # 3.1 1.0 - 4.8 K/uL    Mono # 1.4 (H) 0.3 - 1.0 K/uL    Eos # 0.2 0.0 - 0.5 K/uL    Baso # 0.06 0.00 - 0.20 K/uL    nRBC 0 0 /100 WBC    Gran % 56.4 38.0 - 73.0 %    Lymph % 28.2 18.0 - 48.0 %    Mono % 12.8 4.0 - 15.0 %    Eosinophil % 1.6 0.0 - 8.0 %    Basophil % 0.5 0.0 - 1.9 %    Differential Method Automated    Type and Screen    Collection Time: 04/03/24 10:08 PM   Result Value Ref Range    Group & Rh O POS     Indirect Moraima NEG     Specimen Outdate 04/06/2024 23:59    Brain natriuretic peptide - if not done in ER    Collection Time: 04/03/24 10:08 PM   Result Value Ref Range    BNP 37 0 - 99 pg/mL   Troponin I    Collection Time: 04/03/24 10:08  PM   Result Value Ref Range    Troponin I 0.008 0.000 - 0.026 ng/mL       RADIOLOGY  X-Ray Lumbar Spine Ap And Lateral    Result Date: 4/4/2024  EXAMINATION: XR LUMBAR SPINE AP AND LATERAL CLINICAL HISTORY: back pain; TECHNIQUE: AP, lateral and spot images were performed of the lumbar spine. COMPARISON: 01/10/2024 FINDINGS: There is postoperative change of prior posterior instrumented fusion at L3-L4.  Hardware appears intact.  Lumbar spine alignment appears stable from prior examination.  Lumbar vertebral body heights appear adequately maintained without definite acute displaced fracture.  There is a chronic compression deformity of the T10 vertebral body status post prior vertebroplasty.  There is intervertebral disc space height loss, most pronounced at L2-L3.  Spinal stimulator device is present, similar to prior examination.  Multiple postsurgical changes noted of the abdomen.     As above. Electronically signed by: Josue Zafar MD Date:    04/04/2024 Time:    01:06    CT Abdomen Pelvis  Without Contrast    Result Date: 4/4/2024  EXAMINATION: CT ABDOMEN PELVIS WITHOUT CONTRAST CLINICAL HISTORY: Abdominal pain, acute, nonlocalized;Bowel obstruction suspected; TECHNIQUE: Low dose axial images, sagittal and coronal reformations were obtained from the lung bases to the pubic symphysis without IV contrast.  Oral contrast was not administered. COMPARISON: CTA chest 05/11/2017 FINDINGS: There is no confluent airspace consolidation of the visualized lung bases.  There are a few scattered coronary artery calcifications present. The liver is not significantly enlarged.  There is a calcified hepatic granuloma.  Further assessment of hepatic parenchyma is limited without IV contrast.  The gallbladder is surgically absent.  There is stable mild dilatation of the extrahepatic common bile duct which may relate to post cholecystectomy status. Stomach is nondistended.  The spleen and pancreas appear within normal limits.   There are apparent postsurgical change of the left adrenal gland.  The right adrenal gland is unremarkable.  There is a 4.4 cm fat attenuation lesion with faint peripheral calcification within the anterior abdomen, possibly a lipoma.  This demonstrates mild mass effect upon the underlying left hepatic lobe.  No significant surrounding inflammatory change identified. Kidneys are normal in size and location with mild bilateral renal cortical thinning.  There is no evidence of hydronephrosis.  There is mild nonspecific bilateral perinephric edema.  There is a punctate nonobstructing left renal calculus.  There are several left renal cysts measuring up to 6.4 cm.  No definite obstructing ureteral calculus identified.  The urinary bladder appears within normal limits.  The uterus is surgically absent. The abdominal aorta is normal in course and caliber with mild atherosclerotic calcification along its course.  There is no retroperitoneal hematoma. The visualized loops of small and large bowel show no evidence of obstruction or inflammation.  There is mild retained stool throughout the colon.  There is colonic diverticulosis without evidence of acute diverticulitis.  The appendix is not definitively visualized.  There is no ascites, portal venous gas, or free intraperitoneal air.  There are remote postoperative changes of the anterior abdominal wall. There are remote postoperative changes of the lumbar spine.  There is a chronic T10 compression deformity status post prior vertebroplasty.  There is a spinal stimulator device within the right gluteal subcutaneous soft tissues with partially visualized leads in the thoracic spinal canal. Small focus of subcutaneous emphysema within the left anterior abdominal may relate to recent injection site.  Clinical correlation advised.     No CT evidence of acute intra-abdominal abnormality. Nonobstructing left renal calculus.  No evidence of hydronephrosis or obstructing ureteral  calculus. Multiple left renal cysts. Mild retained stool throughout the colon.  Colonic diverticulosis without evidence of acute diverticulitis. Redemonstration of oval fat attenuation lesion within the anterior abdomen with mild localized mass effect, possibly a lipoma. Cholecystectomy with stable mild dilatation extrahepatic common bile duct. Additional findings as above. Electronically signed by: Josue Zafar MD Date:    04/04/2024 Time:    00:41    X-ray Chest PA And Lateral    Result Date: 4/3/2024  EXAMINATION: XR CHEST PA AND LATERAL TECHNIQUE: PA and lateral views of the chest were performed. COMPARISON: 02/01/2023 FINDINGS: Extraneous artifact present.  Lungs well aerated and no sizable pleural effusion     No active pulmonary finding Electronically signed by: Milla Lo Date:    04/03/2024 Time:    21:55      EKG    MICROBIOLOGY    MDM

## 2024-04-04 NOTE — ASSESSMENT & PLAN NOTE
Chronic, uncontrolled. Latest blood pressure and vitals reviewed-     Temp:  [98.1 °F (36.7 °C)-98.2 °F (36.8 °C)]   Pulse:  [54-66]   Resp:  [16-18]   BP: ()/(56-94)   SpO2:  [97 %-98 %] .   Home meds for hypertension were reviewed and noted below.   Hypertension Medications               furosemide (LASIX) 20 MG tablet TAKE 1 TABLET BY MOUTH ONCE A DAY    losartan (COZAAR) 50 MG tablet TAKE 1 TABLET BY MOUTH TWICE DAILY    metoprolol succinate (TOPROL-XL) 25 MG 24 hr tablet Take 1 tablet (25 mg total) by mouth once daily.     While in the hospital, will manage blood pressure as follows; Continue home antihypertensive regimen    Will utilize p.r.n. blood pressure medication only if patient's blood pressure greater than 160/100 and she develops symptoms such as worsening chest pain or shortness of breath.

## 2024-04-04 NOTE — HPI
Ms. Wood is an 83 year old female patient whose current medical conditions include HLD, HTN, GERD, prior CVA, and Takotsubo cardiomyopathy with recovered EF who presented to [facility] ED yesterday evening due to lower back pain after recent UTI diagnosis. Associated symptoms included generalized weakness and epigastric pain. She denied any associated fever, chills, SOB, palpitations, near syncope, or syncope. Initial workup in ED un-revealing with exception of UTI however patient admitted due to epigastric discomfort/atypical CP. Cardiology consulted to assist with management. Patient seen and examined in ED. Feels well overall. Describes random bouts of epigastric discomfort, not related to exertion, feels may be related to her UTI. Does have some TTP on exam. No exertional CP symptoms. BP labile. She reports compliance with her medications. Followed in clinic by Dr. Martins. Troponin x 2 negative. EKG reviewed, no acute ischemic changes, normal EKG. CT of abd/pelvis showed mild retained stool throughout the colon, colonic diverticulosis without evidence of acute diverticulitis, and non-obstructing left renal calculus.

## 2024-04-04 NOTE — PT/OT/SLP PROGRESS
Physical Therapy      Patient Name:  Leslie Wood   MRN:  [id number]    PT orders received, EVAL initiated via chart review. PT orders discontinued at 1120 prior to EVAL. Please re-consult if situation changes.    Ne Acuna, PT, DPT  4/4/2024   [id number]

## 2024-04-04 NOTE — ASSESSMENT & PLAN NOTE
Patient presented with acute on chronic lumbar back pain with significant surgical history including fusion with retained hardware.  Patient negative for recent falls/trauma, bowel/bladder incontinence, or neurological deficits.  Lumbar x-ray negative for acute findings and showed known postoperative changes similar to previous findings.  Plan:  -optimize pain control  -bowel regimen  -continued PT/OT

## 2024-04-04 NOTE — PHARMACY MED REC
"Admission Medication History     The home medication history was taken by Rosina Shaikh.    You may go to "Admission" then "Reconcile Home Medications" tabs to review and/or act upon these items.     The home medication list has been updated by the Pharmacy department.   Please read ALL comments highlighted in yellow.   Please address this information as you see fit.    Feel free to contact us if you have any questions or require assistance.        Medications listed below were obtained from: Patient/family and Analytic software- Nilson,grand-daughter at bedside  (Not in a hospital admission)      LAST MED REC COMPLETED:       Rosinagiovanni Franklinson  [id number]      Current Outpatient Medications on File Prior to Encounter   Medication Sig Dispense Refill Last Dose    aspirin (ECOTRIN) 81 MG EC tablet Take 1 tablet (81 mg total) by mouth once daily. 90 tablet 3 4/3/2024    atorvastatin (LIPITOR) 10 MG tablet Take 1 tablet (10 mg total) by mouth every evening. 90 tablet 3 4/3/2024    DULoxetine (CYMBALTA) 30 MG capsule Take 30 mg by mouth once daily.   4/3/2024    furosemide (LASIX) 20 MG tablet TAKE 1 TABLET BY MOUTH ONCE A DAY 30 tablet 11 4/3/2024    losartan (COZAAR) 50 MG tablet TAKE 1 TABLET BY MOUTH TWICE DAILY 180 tablet 1 4/3/2024    metoprolol succinate (TOPROL-XL) 25 MG 24 hr tablet Take 1 tablet (25 mg total) by mouth once daily. 90 tablet 3 4/3/2024    NUCYNTA 50 mg Tab Take 1 tablet (50 mg total) by mouth every 8 (eight) hours as needed (severe pain). RESTART WHEN OK PER PAIN MANAGEMENT PROVIDER 1 tablet 0 4/3/2024    tiZANidine (ZANAFLEX) 4 MG tablet    Past Week    mupirocin (BACTROBAN) 2 % ointment Apply topically 2 (two) times daily. 15 g 0 Unknown                           .          "

## 2024-04-04 NOTE — HOSPITAL COURSE
Leslie Wood is a 83 year old female who presented to ED for further evaluation of back pain, abdominal pain, and UTI symptoms. Troponin x 2 negative.  EKG did not show acute ischemic changes, normal EKG. CT of abd/pelvis showed mild retained stool throughout the colon, colonic diverticulosis without evidence of acute diverticulitis, and non-obstructing left renal calculus. Imaging of lumbar spine showed chronic compression changes but nothing acute. She was treated with Rocephin x 2 days for UTI. Urine did not reflex to culture. She will continue Augmentin for additional days. Patient's abdominal pain likely related to constipation and UTI. Other labs and vitals were stable. She was asked to follow up with PCP in 3 days. She will continue outpatient therapy.

## 2024-04-04 NOTE — DISCHARGE SUMMARY
[facility] - Emergency Dept.  Hospital Medicine  Discharge Summary      Patient Name: Leslie Wood  MRN: [id number]  BECKY: [id number]  Patient Class: IP- Inpatient  Admission Date: 4/3/2024  Hospital Length of Stay: 1 days  Discharge Date and Time: 4/4/2024  3:09 PM  Attending Physician: Yaneth att. providers found   Discharging Provider: Ruslan Johansen NP  Primary Care Provider: Yaneth, Primary Doctor    Primary Care Team: Networked reference to record PCT     HPI:   Leslie Wood is a 83 y.o. female with a PMH  has a past medical history of Arthritis, Cataract, GERD (gastroesophageal reflux disease), Heart attack (05/18/2022), Heart attack (05/23/2022), Hyperlipidemia, Hypertension, and Stroke. who presented to the ED directed by urgent care accompanied by her granddaughter for further evaluation of lower back pain for further evaluation after recent diagnosis of UTI.  Patient reports chronic lumbar back pain in his significant surgical history but stated her pain is slightly worse than her usual baseline as well as complaining of generalized weakness and diffuse abdominal pain worse in her epigastric region described as hurting in nature, intermittent, alleviated with immobility and rest but aggravated with movement and palpation to the affected area.  She reported no association with p.o. intake and denied endorsing any lightheadedness, dizziness, headache, visual changes, fever, chills, sweats, nausea, vomiting, chest pain, shortness of breath, dysuria, hematuria, melena, hematochezia, diarrhea, however did report increased constipation.  Prior to onset of symptoms, patient reported being in her usual state of health with no other concerns or complaints.  All other review of systems negative except as noted above.  Patient initially being discharged from ED with continued treatment of UTI prior to patient developing acute epigastric abdominal pain in his being admitted to Hospital Medicine inpatient for continued  cardiac rule out given elevated heart score.  Cardiology consulted and awaiting further evaluation/recommendations.  Cardiology consulted and awaiting further evaluation/recommendations.      PCP: No, Primary Doctor      * No surgery found *      Hospital Course:   Leslie Wood is a 83 year old female who presented to ED for further evaluation of back pain, abdominal pain, and UTI symptoms. Troponin x 2 negative.  EKG did not show acute ischemic changes, normal EKG. CT of abd/pelvis showed mild retained stool throughout the colon, colonic diverticulosis without evidence of acute diverticulitis, and non-obstructing left renal calculus. Imaging of lumbar spine showed chronic compression changes but nothing acute. She was treated with Rocephin x 2 days for UTI. Urine did not reflex to culture. She will continue Augmentin for additional days. Patient's abdominal pain likely related to constipation and UTI. Other labs and vitals were stable. She was asked to follow up with PCP in 3 days. She will continue outpatient therapy.      Goals of Care Treatment Preferences:  Code Status: DNR      Consults:   Consults (From admission, onward)          Status Ordering Provider     Inpatient consult to Cardiology  Once        Provider:  Domingo Martins MD    Completed FATOUMATA HERZOG            No new Assessment & Plan notes have been filed under this hospital service since the last note was generated.  Service: Hospital Medicine    Final Active Diagnoses:    Diagnosis Date Noted POA    PRINCIPAL PROBLEM:  Lumbar back pain [M54.50] 04/04/2024 Yes    Acute cystitis [N30.00] 04/04/2024 Yes    Hemiplegia and hemiparesis following cerebral infarction affecting left non-dominant side [I69.354] 03/04/2021 Not Applicable    Hyperlipidemia LDL goal <100 [E78.5] 07/30/2018 Yes     Chronic    Atypical chest pain [R07.89] 05/17/2017 Yes    Abdominal pain [R10.9] 05/11/2017 Yes    Anxiety and depression [F41.9, F32.A] 09/06/2016 Yes     Prediabetes [R73.03] 09/06/2016 Yes    Hypertension associated with diabetes [E11.59, I15.2] 07/30/2015 Yes      Problems Resolved During this Admission:       Discharged Condition: stable    Disposition: Home or Self Care    Follow Up:    Patient Instructions:   No discharge procedures on file.    Significant Diagnostic Studies: Labs: CMP   Recent Labs   Lab 04/03/24  1531 04/03/24  2208    141   K 4.5 4.3    105   CO2 24 22*   * 111*   BUN 36* 38*   CREATININE 1.3 1.2   CALCIUM 9.9 9.9   PROT 7.4  --    ALBUMIN 3.9  --    BILITOT 0.6  --    ALKPHOS 91  --    AST 27  --    ALT 19  --    ANIONGAP 12 14    and CBC   Recent Labs   Lab 04/03/24  1609 04/03/24  2208   WBC 8.01 11.01   HGB 14.7 14.7   HCT 46.5 46.1    249       Pending Diagnostic Studies:       None           Medications:  Reconciled Home Medications:      Medication List        START taking these medications      amoxicillin-clavulanate 500-125mg 500-125 mg Tab  Commonly known as: AUGMENTIN  Take 1 tablet (500 mg total) by mouth 2 (two) times daily. for 2 days     polyethylene glycol 17 gram Pwpk  Commonly known as: GLYCOLAX  Take 17 g by mouth once daily.            CONTINUE taking these medications      aspirin 81 MG EC tablet  Commonly known as: ECOTRIN  Take 1 tablet (81 mg total) by mouth once daily.     atorvastatin 10 MG tablet  Commonly known as: LIPITOR  Take 1 tablet (10 mg total) by mouth every evening.     DULoxetine 30 MG capsule  Commonly known as: CYMBALTA  Take 30 mg by mouth once daily.     furosemide 20 MG tablet  Commonly known as: LASIX  TAKE 1 TABLET BY MOUTH ONCE A DAY     losartan 50 MG tablet  Commonly known as: COZAAR  TAKE 1 TABLET BY MOUTH TWICE DAILY     metoprolol succinate 25 MG 24 hr tablet  Commonly known as: TOPROL-XL  Take 1 tablet (25 mg total) by mouth once daily.     mupirocin 2 % ointment  Commonly known as: BACTROBAN  Apply topically 2 (two) times daily.     NUCYNTA 50 mg Tab  Generic  drug: tapentadoL  Take 1 tablet (50 mg total) by mouth every 8 (eight) hours as needed (severe pain). RESTART WHEN OK PER PAIN MANAGEMENT PROVIDER     tiZANidine 4 MG tablet  Commonly known as: ZANAFLEX              Indwelling Lines/Drains at time of discharge:   Lines/Drains/Airways       None                   Time spent on the discharge of patient: >35 minutes         Ruslan Johansen NP  Department of Hospital Medicine  [facility] - Emergency Dept.

## 2024-04-08 LAB
BACTERIA BLD CULT: NORMAL
BACTERIA BLD CULT: NORMAL

## 2024-04-11 ENCOUNTER — CLINICAL SUPPORT (OUTPATIENT)
Dept: REHABILITATION | Facility: HOSPITAL | Age: 84
End: 2024-04-11
Payer: MEDICARE

## 2024-04-11 DIAGNOSIS — R68.89 DECREASED STRENGTH, ENDURANCE, AND MOBILITY: ICD-10-CM

## 2024-04-11 DIAGNOSIS — R53.1 DECREASED STRENGTH, ENDURANCE, AND MOBILITY: ICD-10-CM

## 2024-04-11 DIAGNOSIS — Z74.09 DECREASED STRENGTH, ENDURANCE, AND MOBILITY: ICD-10-CM

## 2024-04-11 DIAGNOSIS — R26.9 GAIT ABNORMALITY: ICD-10-CM

## 2024-04-11 DIAGNOSIS — R41.841 COGNITIVE COMMUNICATION DISORDER: Primary | ICD-10-CM

## 2024-04-11 DIAGNOSIS — M46.1 SACROILIITIS, NOT ELSEWHERE CLASSIFIED: Primary | ICD-10-CM

## 2024-04-11 PROCEDURE — 97129 THER IVNTJ 1ST 15 MIN: CPT

## 2024-04-11 PROCEDURE — 97112 NEUROMUSCULAR REEDUCATION: CPT

## 2024-04-11 PROCEDURE — 97530 THERAPEUTIC ACTIVITIES: CPT

## 2024-04-11 PROCEDURE — 97130 THER IVNTJ EA ADDL 15 MIN: CPT

## 2024-04-11 PROCEDURE — 97110 THERAPEUTIC EXERCISES: CPT

## 2024-04-11 NOTE — PROGRESS NOTES
[facility]  Speech Therapy Treatment Note- Neurological Rehabilitation  Date: 4/11/2024     Name: Leslie Wood   MRN: [id number]   Therapy Diagnosis:   Encounter Diagnosis   Name Primary?    Cognitive communication disorder Yes   Physician: Marily Millan NP  Physician Orders: Ambulatory Referral to Speech Therapy   Medical Diagnosis: Disorientation [R41.0], Aphasia [R47.01]     Visit #/ Visits Authorized: 11/ 12 (+eval, +5)  Date of Evaluation:  11/8/23  Insurance Authorization Period: 11/15/2023 - 12/31/2023   Plan of Care Expiration Date:    1/31/24  Extended Plan of Care:  2/21/24-5/1/24  Progress Note: 4/25/2024    Time In:  9:18 AM  Time Out:  10:00 AM  Total Billable Time: 42 mins      Precautions: Standard, Fall, Cognition, and Communication  Subjective:   Patient reports: she has been doing well. She went to the ED for a UTI last week. She reports feeling better today.   She was compliant to home exercise program.     Response to previous treatment: good    Pain Scale: 8/10 on a Visual Analog Scale currently.  Pain Location: lower back  Objective:   TIMED  Procedure Min.   Cognitive Therapeutic Interventions, first 15 minutes CPT 80353  15   Cognitive Therapeutic Interventions, each additional 15 minutes CPT 14091  27           Short Term Goals: (6 weeks) Current Progress:   Patient will sustain attention to a simple task (auditory or visual) for 3 minutes with 90% accuracy or no more than 2 redirections.      Goal Met 12/13/2023   Auditory: met x2  Visual: met x7 Auditory Sustained Attention:  Several sustained auditory attention tasks completed in today's session. Patient listened to multi-step commands and followed with 91% accuracy. She required initial verbal cueing for use of strategies including taking requesting repetition as needed. Improved use of strategies independently with overall improved accuracy in today's session.    Visual Sustained Attention:  Patient completed several  visual sustained attention tasks in today's session in which she visually scanned for letters/numbers in a paragraph.   Trial 1: 1:30, 100% accuracy, 1/7 relative scale of cognitive load   Trial 2: 1:45, 100% accuracy, 1/7 relative scale of cognitive load   Trial 3: 1:30, 100% accuracy, 1/7 relative scale of cognitive load    Trial 4: 2:08, -7 (missed entire row of scanning)   Trial 5: 2:10, 100% accuracy, 1/7 relative scale of cognitive load   Trial 6: 1:30, 100% accuracy, 1/7 relative scale of cognitive load     Overall improved use of strategies to facilitate visual scanning pattern as well as good awareness of errors. Overall high accuracy of completion with low cognitive load.       2. Patient will complete selective attention tasks (auditory or visual) with 85% accuracy independently increase selective attention.                                                GOAL MET 3/18/2024 Auditory Selective Attention:  Patient listened for and followed complex 2 step commands in a dynamic environment with auditory and visual distractions during completion. She followed commands with 92% accuracy and initial verbal cueing for use of strategies. Discussion of how this relates to conversation and asking clarifying questions to facilitate attention and information processing. She rated task as 2/7 on relative scale of cognitive load.    She then listened to a 5 minute video about how basketballs are made. She requested to take notes as needed and paused/rewound video as needed. She answered questions about what she heard with 92% accuracy and rated task as 2/7 on relative scale of cognitive load.     Visual Selective Attention:  Patient participated in visual selective attention task of N-Back (level 1) pictures with natural background noise (therapy door remained opened.   Trial 1: Patient completed task with 86% accuracy given minimal cueing from clinician  Trial 2: Patient completed task with 88% accuracy independently.      GOAL MET 3/18/2024   3. Patient will use attention shifting strategies to shift attention between two tasks (auditory or visual) with no more than 3 cues or 90% accuracy to improve alternating attention.              Auditory: GOAL MET 4/11/2024    Progressing/ Not Met 4/11/2024  Auditory Alternating Attention:   Patient completed auditory alternating attention task of listing items in two categories in an alternating fashion (one animal, one color, one animal, one color). She completed this task with 100% accuracy given consistent moderate cueing to recall items already listed. She was able to recall which category she was supposed to name.       Visual Alternating Attention:   Not formally addressed.    4. Patient will recall functional information (name, birthday, address, etc) following delays of 2-3 minutes following a prompt questions across 3 therapy sessions with 80% accuracy (spaced retrieval).      Progressing/ Not Met 4/11/2024   Not formally addressed     5. Patient will complete a functional task to improve attention, memory and/or executive functions (I.e. sample bill paying activity, recipe, or multiple choice comprehension questions to 1 paragraphs) with 80% accuracy and natural environment noise distractions (TV news background, music, etc.).      Progressing/ Not Met 4/11/2024    Not formally addressed     6. Patient will be educated regarding cognitive deficits as applicable to the patient's life for increased awareness and will execute returned demonstration of information with 80% accuracy.       Goal Met 1/24/2024  Patient and her granddaughter again educated regarding nature of cognitive deficits including a variety of foundational cognitive elements related to R hemisphere dysfunction following CVA including the cognitive triangle (with emphasis on foundational versus higher level cognitive functions, awareness of deficits, impact of attention, information processing, memory, and  executive functioning deficits as each area relates to assessment findings), spoon theory to discuss cognitive versus physical versus emotional fatigue and management of each to more efficiently use energy daily for more or less cognitively demanding tasks, internal versus external distractions (including emotions, pain, stressors, etc) and management of each, as well as overall impact of these deficits on daily functioning. Patient demonstrated awareness of information provided as demonstrated by asking good questions and expressing understanding. Discussed specifically use of mindfulness and meditation/breathing strategies to improve cognitive functioning related to internal, specifically emotional stressors.       7. Patient will participate in continued cognitive assessment of right hemisphere dysfunction.     Goal Met 2023         GOAL MET 2023     Patient Education/Response:   Patient educated regarding the followin. Strategies for improved accuracy such a slowing down and re-checking work   2. Asking for repetition to improve accuracy   3. The difficulty of determining a true language disorder versus cognitive-communication disorder impact on language given multiple sites of lesion and suspected but not confirmed L cerebral vascular accident.     Home program established: Patient instructed to continue prior program  Patient verbalized understanding to all above education provided.     See Electronic Medical Record under Patient Instructions for exercises provided throughout therapy.  Assessment:   Patient is progressing steadily towards goals. Improvement with visual alternating attention today. Auditory alternating attention goal met. Visual alternating attention progress likely impacted by known visual and visuospatial deficits.     Leslie is progressing well towards her goals. Current goals remain appropriate. Goals to be updated as necessary.     Patient prognosis is Fair. Patient will  continue to benefit from skilled outpatient speech and language therapy to address the deficits listed in the problem list on initial evaluation, provide patient/family education and to maximize patient's level of independence in the home and community environment.     Medical necessity is demonstrated by the following IMPAIRMENTS:  Cognition: Deficits in executive functioning, attention, and memory prevent the pt from relaying medically and safety relevant information in a timely manner in a state of emergency.     Barriers to Therapy: NA  Patient's spiritual, cultural and educational needs considered and patient agreeable to plan of care and goals.  Plan:   Continue Plan of Care with focus on rehabilitation and compensation for attention, memory, and executive functioning deficits impacting safety and efficiency of daily task completion.     Becca Blanco, CCC-SLP, CBIS   Speech Language Pathologist   Certified Brain Injury Specialist   4/11/2024

## 2024-04-12 NOTE — PROGRESS NOTES
[facility]   Physical Therapy Treatment Note        Name: Leslie CASTELLON [facility] Number: [id number]    Therapy Diagnosis:   Encounter Diagnoses   Name Primary?    Sacroiliitis, not elsewhere classified Yes    Decreased strength, endurance, and mobility     Gait abnormality        Physician: Marily Millan NP    Visit Date: 4/11/2024    Physician Orders: PT Eval and Treat   Medical Diagnosis from Referral:   Late effect of cerebrovascular accident (CVA)   Hemiplegia and hemiparesis following cerebral infarction affecting left non-dominant side   Gait difficulty   Evaluation Date: 11/16/2023  Authorization Period Expiration: 10/16/2024  Plan of Care Expiration: 4/19/2024  Progress Note Due: 4/19/2024  Visit # / Visits authorized: 8/12 (+eval)  FOTO: 1/3      Precautions: Standard and Fall (1/8/24 - patient reports history of 3 back fusion surgeries)  PTA Visit #: 0/5     Time In: 1000  Time Out: 1100  Total Billable Time: 60 minutes   (Billing reflects 1 on 1 treatment time spent with patient)      Subjective     Patient reports:that lower back and right knee are minimal to none today    He/She was partially compliant with home exercise program.  Response to previous treatment: some pain relief  Functional change: Some increased speed with walking    Pain: 1/10     Location: lumbar paraspinal region    Objective      Objective Measures updated at progress report or POC update only unless otherwise noted.       Palpation: Trigger points on palpation of the lumbar paraspinal musculature    Treatment     Leslie received the treatments listed below:     THERAPEUTIC EXERCISES to develop strength, endurance, ROM, flexibility, posture, and core stabilization for (15) minutes including:    Intervention Performed Today    Nustep    15 minutes, L3   Bicycle  x X 8m   Lower trunk rotations x 10 second holds x 10   Pelvic tilts X Anterior/posterior with cueing- 10 second holds x 2 min   Sitting flexion  "with swiss ball  10x   Isometric adduction with ball   3  min   Abdominal brace x With single knee to chest and slowly lowering - 2x10   Supine x Active straight leg raise - 2x10   Long Arc Quads  3# ankle weight - 3x15       Leslie participated in neuromuscular re-education activities to improve: Balance, Coordination, Proprioception, and Posture for 30 minutes. The following activities were included:      Intervention 12/12/2023  Parameters   Sit to stand from 20 inch mat with no upper extremities assist x 10x/ 2 sets hands together   Prone hip extension x 10x with cues   Sidelying hip series  2x10 abduction  10x/ 2 sets clamshell   Bridging x 10 second holds x 2m   Step Ups x 4 inch box   Standing with red theraband X  X  X Hip Extension - 2x10  Hip Abduction - 2x10  Marching - 2x10   Tandem stance x Hand Touches - 2x10   Matrix  x Single leg Knee Extension - 2x10 - starting at 60-70 deg knee flexion -  no plates, 1 bowling pin  Single leg knee flexion - 2x10, 15#        Plan for Next Visit: Progress as indicated      Leslie participated in dynamic functional therapeutic activities to improve functional performance for 15  minutes, including:    Intervention 4/11/2024  Parameters   Walking with 6# ball throw x 2 passes   Large forward steps with small lunge x 2x10                                                     MANUAL THERAPY TECHNIQUES were applied for (0) minutes, including:    Manual Intervention Performed Today    Soft Tissue Mobilization   STM bilateral multifidus and gluteal/ piriformis,   Joint Mobilizations  PA glides lumbosacral   Mobilization with movement     Muscle energy techniques   "Shotgun" for + SI provocation   Functional Dry Needling   FDN performed to the bilateral lumbosacral paraspinal/multifidus     Plan for Next Visit:         Patient Education and Home Exercises       Home Exercises Provided and Patient Education Provided     Education provided: (5) minutes  Moist heat pack to low back " prior to exercises at home.    Written Home Exercises Provided: yes.  Exercises were reviewed and Leslie was able to demonstrate them prior to the end of the session.  Leslie demonstrated fair  understanding of the education provided. See EMR under Patient Instructions for exercises provided during therapy sessions.    Assessment     Patient reports that right knee and lower back pain are both significantly decreased.  Therefore, continued to work on strengthening and balance activities with multiple activities in closed chain.  There was initial weakness with knee extension.  However, strength did improve with performance of knee flexion on the matrix followed by knee extension.  Will continue to progress into more strengthening with continued treatment.  However, nearing completion of treatment.  Did speak with patient's granddaughter on continuing with an exercise group for continued strengthening.    Leslie is progressing well towards her goals.   Patient prognosis is Fair.     Patient will continue to benefit from skilled outpatient physical therapy to address the deficits listed in the problem list box on initial evaluation, provide pt/family education and to maximize patient's level of independence in the home and community environment.     Patient's spiritual, cultural and educational needs considered and pt agreeable to plan of care and goals.     Anticipated Barriers for therapy: Anxiety or Panic Disorders, Arthritis, Back pain, Depression, Headaches, High  Blood Pressure, Kidney, Bladder, Prostate or Urination Problems, Osteoporosis, Prior Surgery, Stroke or TIA, Visual Impairment      Goals: Reviewed:4/11/2024       Short Term Goals: In 4 weeks   1.Patient to be educated on HEP. - met  2. Patient  to increase lumbar spine ROM to B SB 30 deg, B Rot 75% - PC  3. Patient  to increase hip PROM to 60 deg ER bilaterally, IR 20 deg bilaterally, in order to assist with more normalized gait pattern. - met  4. Patient   to increase B LE and core strength to 4-/5 in order to improve gait and balance.  - PC  5. Patient to increased right knee extn ROM to -5 deg for improved gait mechanics- PC  6. Patient   to have pain less than 2/10 at worst, to improve QOL. - PC  7. Patient  to improve score on the FOTO, to improve QOL.- PC  8. Patient  to be educated on postural/body mechanics awareness. - PC     Long Term Goals: In 8 weeks  1.Patient to improve score on the FOTO to 48 or better, to improve QOL. - PC  2. Patient to demo increase in LE strength to 4/5, in order to improve endurance and increase ability to ambulate for increased time. - PC  3. Patient to have decreased pain to 2/10 at worst, to improve QOL. - met  4. Patient to demo increase lumbar ROM to ,  SB 30 deg, B Rot 90%in order to improve available range of motion for ADL's. - PC  .  5. Patient  to increase hip PROM to Extn +5 deg,  60 deg ER bilaterally, IR 20 deg bilaterally, in order to assist with more normalized gait pattern. - PC  6. Patient to increased right knee extn ROM to -5 deg for improved gait mechanics - PC  6. Patient to perform daily activities without increased symptoms including:  Prolonged walking x 10 minutes. - met  Ascending.descending 6 inch step without upper extremities assistance - PC  Return to gym and work outings 2 times per week with family transportation - met         Plan     Would like to extend treatment for 2 more months to allow the patient to return to an exercise regimen as she has had limited exercises over the last few weeks with the recent death of the patient's daughter.      Leoncio Lujan, PT

## 2024-04-15 ENCOUNTER — CLINICAL SUPPORT (OUTPATIENT)
Dept: REHABILITATION | Facility: HOSPITAL | Age: 84
End: 2024-04-15
Payer: MEDICARE

## 2024-04-15 DIAGNOSIS — I69.30 LATE EFFECT OF CEREBROVASCULAR ACCIDENT (CVA): Primary | ICD-10-CM

## 2024-04-15 DIAGNOSIS — R29.898 FINE MOTOR IMPAIRMENT: ICD-10-CM

## 2024-04-15 DIAGNOSIS — I69.354 HEMIPLEGIA AND HEMIPARESIS FOLLOWING CEREBRAL INFARCTION AFFECTING LEFT NON-DOMINANT SIDE: ICD-10-CM

## 2024-04-15 DIAGNOSIS — Z74.09 DECREASED STRENGTH, ENDURANCE, AND MOBILITY: ICD-10-CM

## 2024-04-15 DIAGNOSIS — Z78.9 DECREASED ACTIVITIES OF DAILY LIVING (ADL): ICD-10-CM

## 2024-04-15 DIAGNOSIS — R53.1 DECREASED STRENGTH, ENDURANCE, AND MOBILITY: ICD-10-CM

## 2024-04-15 DIAGNOSIS — R29.818 FINE MOTOR IMPAIRMENT: ICD-10-CM

## 2024-04-15 DIAGNOSIS — R68.89 DECREASED STRENGTH, ENDURANCE, AND MOBILITY: ICD-10-CM

## 2024-04-15 DIAGNOSIS — R29.898 DECREASED GRIP STRENGTH OF LEFT HAND: ICD-10-CM

## 2024-04-15 DIAGNOSIS — R41.841 COGNITIVE COMMUNICATION DISORDER: Primary | ICD-10-CM

## 2024-04-15 PROCEDURE — 97129 THER IVNTJ 1ST 15 MIN: CPT

## 2024-04-15 PROCEDURE — 97530 THERAPEUTIC ACTIVITIES: CPT

## 2024-04-15 PROCEDURE — 97110 THERAPEUTIC EXERCISES: CPT

## 2024-04-15 PROCEDURE — 97130 THER IVNTJ EA ADDL 15 MIN: CPT

## 2024-04-15 PROCEDURE — 97112 NEUROMUSCULAR REEDUCATION: CPT

## 2024-04-15 NOTE — PROGRESS NOTES
[facility]  Occupational Therapy Treatment Note     Date: 4/15/2024  Name: Leslie CASTELLON [facility] Number: [id number]    Therapy Diagnosis:     Encounter Diagnoses   Name Primary?    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     Decreased  strength of left hand     Decreased strength, endurance, and mobility     Decreased activities of daily living (ADL)     Fine motor impairment        Physician: Marily Millan NP    Physician Orders: Occupational Therapy to Evaluate and Treat   Medical Diagnosis: R29.898 (ICD-10-CM) - Poor fine motor skills  Surgical Procedure and Date: N/A     Evaluation Date: 11/8/2023  Insurance Authorization Period Expiration: 11/8/2023 - 12/31/2023  Plan of Care Certification Period: 11/8/2023 - 02/08/2024  Updated Plan of Care Certification Period: 02/08/2024 - 05/21/2024  Progress Note Due: 4/21/2024     Date of Return to MD: N/A     Visit # / Visits authorized: 6/25 (11 Episode Visits)  FOTO: 1/3     Precautions:  Standard    Time In: 11:15  Time Out: 11:55  Total Treatment Time: 40 minutes  Total Billable Time: 40 minutes    Subjective     Patient reports: She is doing okay. She has been staying with her granddaughter     she was compliant with home exercise program given last session.   Response to previous treatment: Tolerated well  Functional change: Improved knowledge of HEP    Pain: 4/10  Location: bilateral hands     Objective     Please see progress note for updated objective measures     Treatment     Supervised Modalities: Modalities such as hot/cold packs, traction, unattended electrical stimulation, paraffin bath, fluidotherapy to reduce pain and increase soft tissue extensibility. Leslie received (0) minutes of modalities listed below after being cleared for contraindications.  x = modalities performed     Supervised Modalities 4/15/2024                             Manual Therapy Techniques: Joint mobilizations, Soft tissue Mobilization, and mobilization with movement applied to the area listed below. Leslie received (0) minutes of interventions. x = intervention performed today    Manual Intervention 4/15/2024    Soft Tissue Mobilization     Joint Mobilizations     Mobilization with movement              Therapeutic Exercises: to develop strength, endurance, ROM, flexibility, posture and core stabilization. Leslie received (15) minutes of exercises listed below. x = performed today * = level of progression of activity     Therapeutic Exercise 4/15/2024    Interossei  -green foam  -rubber bands x 20x bilateral    Digi flexion - red x 20x bilateral    Resistive pinch - red  -2pt  -3pt  -lateral x 20x bilateral    Thumb palmar   -adduction  -abduction x 20x bilateral    Thumb radial  -adduction  -abduction x 20x bilateral    Forearm exercise  - wrist flexion  -wrist extension  - supination  -pronation x 2lb        Therapeutic Activities: Everyday tasks to address the combined need of improved strength, range of motion, and endurance to achieve increased independence. While simulating these activities, the person is applying the gained skills of strength and improved range of motion in a practical way.  Leslie received (10) minutes of activities listed below. x = activities performed today * = level of progression of activity     Therapeutic Activities 4/15/2024    Velcro board  -lateral pinch  - dowel  x 20x bilateral    Opening and closing containers x 5 minutes, ergonomics               Neuromuscular Re-education: the use of functional strengthening, stretching, balancing and coordination activities that train the nerves and muscles to react and communicate to restore normal body movement patterns to increase self care independence. Leslie received (15) minutes of interventions listed below.  x = interventions performed today * = level of progression of activity      Neuromuscular Re-education 4/15/2024    Finger opposition  x 20x   Finger taps (extension) x 20x   Finger opposition with slides x    Table Top active assist range of motion  - shoulder flexion  - shoulder abduction  x 5x - 15 second holds     Self Care: Fundamental skills required to independently care for oneself. Activities of daily living such as eating, bathing, dressing, toileting, grooming, sleeping, transfers and mobility. Instrumental activities of daily living such as driving, medication management, pet care, home management/cleaning, financial management, using the phone, meal preparation and shopping. Leslie received (0) minutes of interventions listed below.  x = interventions performed * = level of progression of activity     Self Care 4/15/2024                            Home Exercises and Education Provided     Education provided:   - home exercise program education provided  - progress towards goals     Written Home Exercises Provided: Patient instructed to cont prior HEP.  Exercises were reviewed and Leslie was able to demonstrate them prior to the end of the session. Leslie demonstrated good understanding of the home exercise program provided.     See EMR under Patient Instructions for exercises provided prior visit.        Assessment     Patient tolerated exercises well. Needs verbal cues for hand placement.     Leslie is progressing towards her goals and there are no updates to goals at this time. Patient prognosis is Good.     Patient will continue to benefit from skilled outpatient occupational therapy to address the deficits listed in the problem list on initial evaluation provide patient/family education and to maximize patient's level of independence in the home and community environment.     Patient's spiritual, cultural and educational needs considered and patient agreeable to plan of care and goals.    Anticipated barriers to occupational therapy: Cognition, home exercise program  compliance    Goals:     Short Term Goals:  6 weeks  Progress    Pain: Patient will demonstrate improved pain by reports of less than or equal to 7/10 worst pain on the verbal rating scale in order to progress toward maximal functional ability and improve quality of life. PC   Function: Patient will demonstrate improved function as indicated by a functional limitation score of less than or equal to 46 out of 100 on FOTO. PC   Mobility: Patient will improve active range of motion to 50% of stated goals, listed in objective measures above, in order to progress towards independence with functional activities.  PC   Strength: Patient will improve strength to 50% of stated goals, listed in objective measures above, in order to progress towards independence with functional activities.  PC   HEP: Patient will demonstrate independence with home exercise program in order to progress toward functional independence. PC      Long Term Goals:  12 weeks  Progress   Pain: Patient will demonstrate improved pain by reports of less than or equal to 6/10 worst pain on the verbal rating scale in order to progress toward maximal functional ability and improve quality of life.   PC   Function: Patient will demonstrate improved function as indicated by a functional limitation score of less than or equal to 48 out of 100 on FOTO. PC   Mobility: Patient will improve active range of motion to stated goals, listed in objective measures above, in order to return to maximal functional potential and improve quality of life. PC   Strength: Patient will improve strength to stated goals, listed in objective measures above, in order to improve functional independence and quality of life. PC   Patient will return to normal ADL's, IADL's, community involvement, recreational activities, and work-related activities with less than or equal to 5/10 pain and maximal function.  PC      Goals Key:  PC= Progressing/Continue;       PM= Partially Met;       GM=  Goal Met,      DC= Discontinue    Plan     Continue Plan of Care (POC) and progress per patient tolerance.      Ivana Gallo, OT  ,OTD, OTR/L

## 2024-04-15 NOTE — PROGRESS NOTES
[facility]  Speech Therapy Treatment Note- Neurological Rehabilitation  Date: 4/15/2024     Name: Leslie Wood   MRN: [id number]   Therapy Diagnosis:   Encounter Diagnosis   Name Primary?    Cognitive communication disorder Yes     Physician: Marily Millan NP  Physician Orders: Ambulatory Referral to Speech Therapy   Medical Diagnosis: Disorientation [R41.0], Aphasia [R47.01]     Visit #/ Visits Authorized: 12/ 12 (+eval, +5)  Date of Evaluation:  11/8/23  Insurance Authorization Period: 11/15/2023 - 12/31/2023   Plan of Care Expiration Date:    1/31/24  Extended Plan of Care:  2/21/24-5/1/24  Progress Note: 4/25/2024    Time In:  10:35 AM  Time Out:  11:15 AM  Total Billable Time: 40 mins      Precautions: Standard, Fall, Cognition, and Communication  Subjective:   Patient reports: feeling foggy. Increased in disorganized speech noted today.   She was compliant to home exercise program.     Response to previous treatment: good    Pain Scale: 8/10 on a Visual Analog Scale currently.  Pain Location: lower back  Objective:   TIMED  Procedure Min.   Cognitive Therapeutic Interventions, first 15 minutes CPT 71579  15   Cognitive Therapeutic Interventions, each additional 15 minutes CPT 04408  25           Short Term Goals: (6 weeks) Current Progress:   Patient will sustain attention to a simple task (auditory or visual) for 3 minutes with 90% accuracy or no more than 2 redirections.      Goal Met 12/13/2023   Auditory: met x2  Visual: met x7 Auditory Sustained Attention:  Several sustained auditory attention tasks completed in today's session. Patient listened to multi-step commands and followed with 91% accuracy. She required initial verbal cueing for use of strategies including taking requesting repetition as needed. Improved use of strategies independently with overall improved accuracy in today's session.    Visual Sustained Attention:  Patient completed several visual sustained attention tasks  in today's session in which she visually scanned for letters/numbers in a paragraph.   Trial 1: 1:30, 100% accuracy, 1/7 relative scale of cognitive load   Trial 2: 1:45, 100% accuracy, 1/7 relative scale of cognitive load   Trial 3: 1:30, 100% accuracy, 1/7 relative scale of cognitive load    Trial 4: 2:08, -7 (missed entire row of scanning)   Trial 5: 2:10, 100% accuracy, 1/7 relative scale of cognitive load   Trial 6: 1:30, 100% accuracy, 1/7 relative scale of cognitive load     Overall improved use of strategies to facilitate visual scanning pattern as well as good awareness of errors. Overall high accuracy of completion with low cognitive load.       2. Patient will complete selective attention tasks (auditory or visual) with 85% accuracy independently increase selective attention.                                                GOAL MET 3/18/2024 Auditory Selective Attention:  Patient listened for and followed complex 2 step commands in a dynamic environment with auditory and visual distractions during completion. She followed commands with 92% accuracy and initial verbal cueing for use of strategies. Discussion of how this relates to conversation and asking clarifying questions to facilitate attention and information processing. She rated task as 2/7 on relative scale of cognitive load.    She then listened to a 5 minute video about how basketballs are made. She requested to take notes as needed and paused/rewound video as needed. She answered questions about what she heard with 92% accuracy and rated task as 2/7 on relative scale of cognitive load.     Visual Selective Attention:  Patient participated in visual selective attention task of N-Back (level 1) pictures with natural background noise (therapy door remained opened.   Trial 1: Patient completed task with 86% accuracy given minimal cueing from clinician  Trial 2: Patient completed task with 88% accuracy independently.     GOAL MET 3/18/2024   3.  "Patient will use attention shifting strategies to shift attention between two tasks (auditory or visual) with no more than 3 cues or 90% accuracy to improve alternating attention.              Auditory: GOAL MET 4/11/2024    Progressing/ Not Met 4/15/2024  Visual Alternating Attention:   Patient completed visual alternating attention task of alternating symbols (Constant Therapy Level 1). She completed this task with 84% accuracy given minimal verbal cueing.     Patient and clinician discussed ways to improve accuracy such as verbalizing pattern to add auditory input. Patient then verbalized alternating pattern (I.e. "square, triangle, square triangle"). Using this strategy, she completed this task with 93% accuracy.    4. Patient will recall functional information (name, birthday, address, etc) following delays of 2-3 minutes following a prompt questions across 3 therapy sessions with 80% accuracy (spaced retrieval).      Progressing/ Not Met 4/15/2024   Not formally addressed     5. Patient will complete a functional task to improve attention, memory and/or executive functions (I.e. sample bill paying activity, recipe, or multiple choice comprehension questions to 1 paragraphs) with 80% accuracy and natural environment noise distractions (TV news background, music, etc.).      Progressing/ Not Met 4/15/2024    Not formally addressed     6. Patient will be educated regarding cognitive deficits as applicable to the patient's life for increased awareness and will execute returned demonstration of information with 80% accuracy.       Goal Met 1/24/2024  Patient and her granddaughter again educated regarding nature of cognitive deficits including a variety of foundational cognitive elements related to R hemisphere dysfunction following CVA including the cognitive triangle (with emphasis on foundational versus higher level cognitive functions, awareness of deficits, impact of attention, information processing, memory, " and executive functioning deficits as each area relates to assessment findings), spoon theory to discuss cognitive versus physical versus emotional fatigue and management of each to more efficiently use energy daily for more or less cognitively demanding tasks, internal versus external distractions (including emotions, pain, stressors, etc) and management of each, as well as overall impact of these deficits on daily functioning. Patient demonstrated awareness of information provided as demonstrated by asking good questions and expressing understanding. Discussed specifically use of mindfulness and meditation/breathing strategies to improve cognitive functioning related to internal, specifically emotional stressors.       7. Patient will participate in continued cognitive assessment of right hemisphere dysfunction.     Goal Met 2023         GOAL MET 2023     Patient Education/Response:   Patient educated regarding the followin. Strategies for improved accuracy such multiple forms of input (auditory and visual)  2. Asking for repetition to improve accuracy   3. How pain may impact overall cognitive-communication functioning.     Home program established: Patient instructed to continue prior program  Patient verbalized understanding to all above education provided.     See Electronic Medical Record under Patient Instructions for exercises provided throughout therapy.  Assessment:   Patient is progressing steadily towards goals. Improvement with visual alternating attention today with use of strategies. Patient and clinician discussed how pain may impact overall cognitive-communication functioning. Patient has chronic back pain that she reported seemed worse today.     Leslie is progressing well towards her goals. Current goals remain appropriate. Goals to be updated as necessary.     Patient prognosis is Fair. Patient will continue to benefit from skilled outpatient speech and language therapy to address  the deficits listed in the problem list on initial evaluation, provide patient/family education and to maximize patient's level of independence in the home and community environment.     Medical necessity is demonstrated by the following IMPAIRMENTS:  Cognition: Deficits in executive functioning, attention, and memory prevent the pt from relaying medically and safety relevant information in a timely manner in a state of emergency.     Barriers to Therapy: NA  Patient's spiritual, cultural and educational needs considered and patient agreeable to plan of care and goals.  Plan:   Continue Plan of Care with focus on rehabilitation and compensation for attention, memory, and executive functioning deficits impacting safety and efficiency of daily task completion.     Becca Blanco, CCC-SLP, CBIS   Speech Language Pathologist   Certified Brain Injury Specialist   4/15/2024

## 2024-04-16 ENCOUNTER — HOSPITAL ENCOUNTER (EMERGENCY)
Facility: HOSPITAL | Age: 84
Discharge: HOME | End: 2024-04-16
Attending: EMERGENCY MEDICINE
Payer: MEDICARE

## 2024-04-16 VITALS
HEART RATE: 61 BPM | DIASTOLIC BLOOD PRESSURE: 72 MMHG | SYSTOLIC BLOOD PRESSURE: 164 MMHG | TEMPERATURE: 98 F | RESPIRATION RATE: 20 BRPM | OXYGEN SATURATION: 99 %

## 2024-04-16 DIAGNOSIS — R07.9 CHEST PAIN, UNSPECIFIED TYPE: Primary | ICD-10-CM

## 2024-04-16 DIAGNOSIS — N39.0 ACUTE LOWER UTI: ICD-10-CM

## 2024-04-16 DIAGNOSIS — R07.9 CHEST PAIN: ICD-10-CM

## 2024-04-16 LAB
ALBUMIN SERPL BCP-MCNC: 3.5 G/DL (ref 3.5–5.2)
ALP SERPL-CCNC: 83 U/L (ref 55–135)
ALT SERPL W/O P-5'-P-CCNC: 20 U/L (ref 10–44)
ANION GAP SERPL CALC-SCNC: 9 MMOL/L (ref 8–16)
AST SERPL-CCNC: 23 U/L (ref 10–40)
BACTERIA #/AREA URNS HPF: ABNORMAL /HPF
BASOPHILS # BLD AUTO: 0.04 K/UL (ref 0–0.2)
BASOPHILS NFR BLD: 0.7 % (ref 0–1.9)
BILIRUB SERPL-MCNC: 0.5 MG/DL (ref 0.1–1)
BILIRUB UR QL STRIP: NEGATIVE
BNP SERPL-MCNC: 108 PG/ML (ref 0–99)
BUN SERPL-MCNC: 16 MG/DL (ref 8–23)
CALCIUM SERPL-MCNC: 10 MG/DL (ref 8.7–10.5)
CHLORIDE SERPL-SCNC: 108 MMOL/L (ref 95–110)
CLARITY UR: CLEAR
CO2 SERPL-SCNC: 27 MMOL/L (ref 23–29)
COLOR UR: YELLOW
CREAT SERPL-MCNC: 0.8 MG/DL (ref 0.5–1.4)
DIFFERENTIAL METHOD BLD: NORMAL
EOSINOPHIL # BLD AUTO: 0.1 K/UL (ref 0–0.5)
EOSINOPHIL NFR BLD: 1.8 % (ref 0–8)
ERYTHROCYTE [DISTWIDTH] IN BLOOD BY AUTOMATED COUNT: 12.8 % (ref 11.5–14.5)
EST. GFR  (NO RACE VARIABLE): >60 ML/MIN/1.73 M^2
GLUCOSE SERPL-MCNC: 101 MG/DL (ref 70–110)
GLUCOSE UR QL STRIP: NEGATIVE
HCT VFR BLD AUTO: 41.9 % (ref 37–48.5)
HGB BLD-MCNC: 13.4 G/DL (ref 12–16)
HGB UR QL STRIP: NEGATIVE
HYALINE CASTS #/AREA URNS LPF: 3 /LPF
IMM GRANULOCYTES # BLD AUTO: 0.01 K/UL (ref 0–0.04)
IMM GRANULOCYTES NFR BLD AUTO: 0.2 % (ref 0–0.5)
KETONES UR QL STRIP: NEGATIVE
LEUKOCYTE ESTERASE UR QL STRIP: ABNORMAL
LYMPHOCYTES # BLD AUTO: 1.5 K/UL (ref 1–4.8)
LYMPHOCYTES NFR BLD: 25.4 % (ref 18–48)
MCH RBC QN AUTO: 29.7 PG (ref 27–31)
MCHC RBC AUTO-ENTMCNC: 32 G/DL (ref 32–36)
MCV RBC AUTO: 93 FL (ref 82–98)
MICROSCOPIC COMMENT: ABNORMAL
MONOCYTES # BLD AUTO: 0.7 K/UL (ref 0.3–1)
MONOCYTES NFR BLD: 10.9 % (ref 4–15)
NEUTROPHILS # BLD AUTO: 3.7 K/UL (ref 1.8–7.7)
NEUTROPHILS NFR BLD: 61 % (ref 38–73)
NITRITE UR QL STRIP: NEGATIVE
NRBC BLD-RTO: 0 /100 WBC
PH UR STRIP: 6 [PH] (ref 5–8)
PLATELET # BLD AUTO: 230 K/UL (ref 150–450)
PMV BLD AUTO: 9.7 FL (ref 9.2–12.9)
POTASSIUM SERPL-SCNC: 4.6 MMOL/L (ref 3.5–5.1)
PROT SERPL-MCNC: 6.6 G/DL (ref 6–8.4)
PROT UR QL STRIP: NEGATIVE
RBC # BLD AUTO: 4.51 M/UL (ref 4–5.4)
RBC #/AREA URNS HPF: 9 /HPF (ref 0–4)
SODIUM SERPL-SCNC: 144 MMOL/L (ref 136–145)
SP GR UR STRIP: 1.01 (ref 1–1.03)
SQUAMOUS #/AREA URNS HPF: 9 /HPF
TROPONIN I SERPL DL<=0.01 NG/ML-MCNC: <0.006 NG/ML (ref 0–0.03)
TROPONIN I SERPL DL<=0.01 NG/ML-MCNC: <0.006 NG/ML (ref 0–0.03)
URN SPEC COLLECT METH UR: ABNORMAL
UROBILINOGEN UR STRIP-ACNC: NEGATIVE EU/DL
WBC # BLD AUTO: 6.03 K/UL (ref 3.9–12.7)
WBC #/AREA URNS HPF: 22 /HPF (ref 0–5)

## 2024-04-16 PROCEDURE — 96361 HYDRATE IV INFUSION ADD-ON: CPT

## 2024-04-16 PROCEDURE — 96365 THER/PROPH/DIAG IV INF INIT: CPT

## 2024-04-16 PROCEDURE — 93005 ELECTROCARDIOGRAM TRACING: CPT

## 2024-04-16 PROCEDURE — 85025 COMPLETE CBC W/AUTO DIFF WBC: CPT | Performed by: EMERGENCY MEDICINE

## 2024-04-16 PROCEDURE — 81000 URINALYSIS NONAUTO W/SCOPE: CPT | Performed by: EMERGENCY MEDICINE

## 2024-04-16 PROCEDURE — 25000003 PHARM REV CODE 250: Performed by: EMERGENCY MEDICINE

## 2024-04-16 PROCEDURE — 99285 EMERGENCY DEPT VISIT HI MDM: CPT | Mod: 25

## 2024-04-16 PROCEDURE — 87088 URINE BACTERIA CULTURE: CPT | Performed by: EMERGENCY MEDICINE

## 2024-04-16 PROCEDURE — 87086 URINE CULTURE/COLONY COUNT: CPT | Performed by: EMERGENCY MEDICINE

## 2024-04-16 PROCEDURE — 80053 COMPREHEN METABOLIC PANEL: CPT | Performed by: EMERGENCY MEDICINE

## 2024-04-16 PROCEDURE — 83880 ASSAY OF NATRIURETIC PEPTIDE: CPT | Performed by: EMERGENCY MEDICINE

## 2024-04-16 PROCEDURE — 93010 ELECTROCARDIOGRAM REPORT: CPT | Mod: ,,, | Performed by: INTERNAL MEDICINE

## 2024-04-16 PROCEDURE — 84484 ASSAY OF TROPONIN QUANT: CPT | Mod: 91 | Performed by: EMERGENCY MEDICINE

## 2024-04-16 PROCEDURE — 63600175 PHARM REV CODE 636 W HCPCS: Performed by: EMERGENCY MEDICINE

## 2024-04-16 RX ORDER — ASPIRIN 325 MG
325 TABLET ORAL
Status: COMPLETED | OUTPATIENT
Start: 2024-04-16 | End: 2024-04-16

## 2024-04-16 RX ORDER — CEPHALEXIN 500 MG/1
500 CAPSULE ORAL 4 TIMES DAILY
Qty: 28 CAPSULE | Refills: 0 | Status: SHIPPED | OUTPATIENT
Start: 2024-04-16 | End: 2024-04-16

## 2024-04-16 RX ORDER — CEPHALEXIN 500 MG/1
500 CAPSULE ORAL 4 TIMES DAILY
Qty: 28 CAPSULE | Refills: 0 | Status: SHIPPED | OUTPATIENT
Start: 2024-04-16 | End: 2024-04-23

## 2024-04-16 RX ADMIN — CEFTRIAXONE SODIUM 1 G: 1 INJECTION, POWDER, FOR SOLUTION INTRAMUSCULAR; INTRAVENOUS at 03:04

## 2024-04-16 RX ADMIN — SODIUM CHLORIDE 500 ML: 9 INJECTION, SOLUTION INTRAVENOUS at 01:04

## 2024-04-16 RX ADMIN — ASPIRIN 325 MG ORAL TABLET 325 MG: 325 PILL ORAL at 01:04

## 2024-04-16 NOTE — DISCHARGE INSTRUCTIONS
Your chest pain does not appear to have a cardiac cause today.  Please continue all your home medications.  Take Keflex as prescribed for urinary tract infection and follow up with the primary care provider 1-2 days for re-evaluation.  Return as needed for any worsening symptoms, problems, questions or concerns

## 2024-04-16 NOTE — ED PROVIDER NOTES
"SCRIBE #1 NOTE: I, Ying Aldridge, am scribing for, and in the presence of, Jarett Lerner Jr., MD. I have scribed the entire note.       History     Chief Complaint   Patient presents with    Chest Pain     EMS reports pressure type chest pain that started this morning. Pt. Has been out of her [place] for a few days. Pt. Also has had some recent personal stressors.      Review of patient's allergies indicates:   Allergen Reactions    Amitriptyline     Hydrochlorothiazide      Causes muscle cramping    Lisinopril      hyperkalemia    Opioids - morphine analogues Hallucinations     Chest pain and hallucinations    Oxycodone     Percocet [oxycodone-acetaminophen] Other (See Comments)     Seizures    Belbuca [buprenorphine hcl] Nausea And Vomiting and Other (See Comments)     Black out     Codeine Nausea Only and Rash    Prazosin Other (See Comments)     dizziness         History of Present Illness     HPI    4/16/2024, 12:37 PM  History obtained from the  EMS and patient      History of Present Illness: Leslie Wood is a 83 y.o. female patient with a PMHx of HTN, HLD, MI, CVA, GERD who presents to the Emergency Department for evaluation of midline CP which onset yesterday. The pt states that the pain "just came and went" when it first began. Symptoms are intermittent and moderate in severity. The pt states the pain is exacerbated when taking deep breaths. The pt's relative states that the pt was recently dx and admitted for a UTI and prescribed Augmentin for 5 days. She states that the pt has completed the course of the abx, but she is still concerned about her health. Pt's relative states that she has noticed that the pt has been experiencing congestion, cough, and has been more confused than normal recently. She also mentions that the pt has appeared more pale than normal to her. Pt's relative states that the pt was trying to get to the restroom this morning when she had trouble standing and nearly fell onto the " "floor due to weakness. Pt's relative also mentions that OT visited the pt yesterday and noticed that the pt didin't sound right." Patient denies any fever, chills, n/v/d, headaches, dizziness, abdominal pain, SOB, and all other sxs at this time. No further complaints or concerns at this time.       Arrival mode: EMS    PCP: Yaneth, Primary Doctor        Past Medical History:  Past Medical History:   Diagnosis Date    Arthritis     Cataract     GERD (gastroesophageal reflux disease)     Heart attack 05/18/2022    Heart attack 05/23/2022    Hyperlipidemia     Hypertension     Stroke        Past Surgical History:  Past Surgical History:   Procedure Laterality Date    ADENOIDECTOMY      ADRENAL GLAND SURGERY      APPENDECTOMY      BACK SURGERY      fusion l 4-5 s 1,2,3  fusion l 2-3    CORONARY ANGIOGRAPHY N/A 05/20/2022    Procedure: ANGIOGRAM, CORONARY ARTERY;  Surgeon: Domingo Martins MD;  Location: [facility] CATH LAB;  Service: Cardiology;  Laterality: N/A;    CORONARY ANGIOGRAPHY N/A 05/24/2022    Procedure: ANGIOGRAM, CORONARY ARTERY;  Surgeon: Domingo Martins MD;  Location: [facility] CATH LAB;  Service: Cardiology;  Laterality: N/A;    EYE SURGERY      HEMORRHOID SURGERY      HERNIA REPAIR      HYSTERECTOMY      partial    indirect lumbar decompression      percutaneous placement of interspinous extension blocker    LEFT HEART CATHETERIZATION Left 05/20/2022    Procedure: CATHETERIZATION, HEART, LEFT;  Surgeon: Domingo Martins MD;  Location: [facility] CATH LAB;  Service: Cardiology;  Laterality: Left;    TONSILLECTOMY           Family History:  Family History   Problem Relation Name Age of Onset    Heart disease Mother      Hypertension Mother      Diabetes Mother      Hypertension Father      Kidney disease Father      Breast cancer Sister         Social History:  Social History     Tobacco Use    Smoking status: Never    Smokeless tobacco: Never   Substance and Sexual Activity    Alcohol use: No    Drug use: No    Sexual " activity: Not Currently        Review of Systems     Review of Systems   Constitutional:  Negative for chills and fever.   HENT:  Positive for congestion. Negative for sore throat.    Respiratory:  Positive for cough. Negative for shortness of breath.    Cardiovascular:  Positive for chest pain (midline).   Gastrointestinal:  Negative for abdominal pain, diarrhea, nausea and vomiting.   Genitourinary:  Negative for dysuria.   Musculoskeletal:  Negative for back pain.   Skin:  Positive for pallor. Negative for rash.   Neurological:  Positive for weakness (generalized). Negative for dizziness and headaches.   Hematological:  Does not bruise/bleed easily.   Psychiatric/Behavioral:  Positive for confusion.    All other systems reviewed and are negative.     Physical Exam     Initial Vitals   BP Pulse Resp Temp SpO2   04/16/24 1213 04/16/24 1213 04/16/24 1213 04/16/24 1215 04/16/24 1213   103/73 78 18 97.8 °F (36.6 °C) 97 %      MAP       --                 Physical Exam  Nursing Notes and Vital Signs Reviewed.  Constitutional: Patient is in no acute distress. Well-developed and well-nourished.  Head: Atraumatic. Normocephalic.  Eyes:  EOM intact.  No scleral icterus.  ENT: Mucous membranes are moist.  Nares clear   Neck:  Full ROM. No JVD.  Cardiovascular: Regular rate. Regular rhythm No murmurs, rubs, or gallops. Distal pulses are 2+ and symmetric  Pulmonary/Chest: No respiratory distress. Clear to auscultation bilaterally. No wheezing or rales.  Equal chest wall rise bilaterally  Abdominal: Soft and non-distended.  There is no tenderness.  No rebound, guarding, or rigidity. Good bowel sounds.  Genitourinary: No CVA tenderness.  No suprapubic tenderness  Musculoskeletal: Moves all extremities. No obvious deformities.  5 x 5 strength in all extremities   Skin: Warm and dry.  Neurological:  Alert, awake, and appropriate.  Normal speech.  No acute focal neurological deficits are appreciated.  Two through 12 intact  bilaterally.  Psychiatric: Normal affect. Good eye contact. Appropriate in content.      ED Course   Procedures  ED Vital Signs:  Vitals:    04/16/24 1213 04/16/24 1215 04/16/24 1324 04/16/24 1400   BP: 103/73   (!) 168/103   Pulse: 78  (!) 56 (!) 56   Resp: 18   20   Temp:  97.8 °F (36.6 °C)     TempSrc:  Oral     SpO2: 97%   99%    04/16/24 1430 04/16/24 1700   BP: (!) 170/67 (!) 155/71   Pulse: (!) 53 (!) 58   Resp: 20 20   Temp:     TempSrc:     SpO2: 100% 97%       Abnormal Lab Results:  Labs Reviewed   B-TYPE NATRIURETIC PEPTIDE - Abnormal; Notable for the following components:       Result Value     (*)     All other components within normal limits   URINALYSIS, REFLEX TO URINE CULTURE - Abnormal; Notable for the following components:    Leukocytes, UA 3+ (*)     All other components within normal limits    Narrative:     Specimen Source->Urine   URINALYSIS MICROSCOPIC - Abnormal; Notable for the following components:    RBC, UA 9 (*)     WBC, UA 22 (*)     Hyaline Casts, UA 3 (*)     All other components within normal limits    Narrative:     Specimen Source->Urine   CULTURE, URINE   CBC W/ AUTO DIFFERENTIAL   COMPREHENSIVE METABOLIC PANEL   TROPONIN I   TROPONIN I        All Lab Results:  Results for orders placed or performed during the hospital encounter of 04/16/24   CBC auto differential   Result Value Ref Range    WBC 6.03 3.90 - 12.70 K/uL    RBC 4.51 4.00 - 5.40 M/uL    Hemoglobin 13.4 12.0 - 16.0 g/dL    Hematocrit 41.9 37.0 - 48.5 %    MCV 93 82 - 98 fL    MCH 29.7 27.0 - 31.0 pg    MCHC 32.0 32.0 - 36.0 g/dL    RDW 12.8 11.5 - 14.5 %    Platelets 230 150 - 450 K/uL    MPV 9.7 9.2 - 12.9 fL    Immature Granulocytes 0.2 0.0 - 0.5 %    Gran # (ANC) 3.7 1.8 - 7.7 K/uL    Immature Grans (Abs) 0.01 0.00 - 0.04 K/uL    Lymph # 1.5 1.0 - 4.8 K/uL    Mono # 0.7 0.3 - 1.0 K/uL    Eos # 0.1 0.0 - 0.5 K/uL    Baso # 0.04 0.00 - 0.20 K/uL    nRBC 0 0 /100 WBC    Gran % 61.0 38.0 - 73.0 %    Lymph % 25.4  18.0 - 48.0 %    Mono % 10.9 4.0 - 15.0 %    Eosinophil % 1.8 0.0 - 8.0 %    Basophil % 0.7 0.0 - 1.9 %    Differential Method Automated    Comprehensive metabolic panel   Result Value Ref Range    Sodium 144 136 - 145 mmol/L    Potassium 4.6 3.5 - 5.1 mmol/L    Chloride 108 95 - 110 mmol/L    CO2 27 23 - 29 mmol/L    Glucose 101 70 - 110 mg/dL    BUN 16 8 - 23 mg/dL    Creatinine 0.8 0.5 - 1.4 mg/dL    Calcium 10.0 8.7 - 10.5 mg/dL    Total Protein 6.6 6.0 - 8.4 g/dL    Albumin 3.5 3.5 - 5.2 g/dL    Total Bilirubin 0.5 0.1 - 1.0 mg/dL    Alkaline Phosphatase 83 55 - 135 U/L    AST 23 10 - 40 U/L    ALT 20 10 - 44 U/L    eGFR >60 >60 mL/min/1.73 m^2    Anion Gap 9 8 - 16 mmol/L   Troponin I #1   Result Value Ref Range    Troponin I <0.006 0.000 - 0.026 ng/mL   Troponin I #2   Result Value Ref Range    Troponin I <0.006 0.000 - 0.026 ng/mL   BNP   Result Value Ref Range     (H) 0 - 99 pg/mL   Urinalysis, Reflex to Urine Culture Urine, Clean Catch    Specimen: Urine, Clean Catch   Result Value Ref Range    Specimen UA Urine, Clean Catch     Color, UA Yellow Yellow, Straw, Izabela    Appearance, UA Clear Clear    pH, UA 6.0 5.0 - 8.0    Specific Gravity, UA 1.015 1.005 - 1.030    Protein, UA Negative Negative    Glucose, UA Negative Negative    Ketones, UA Negative Negative    Bilirubin (UA) Negative Negative    Occult Blood UA Negative Negative    Nitrite, UA Negative Negative    Urobilinogen, UA Negative <2.0 EU/dL    Leukocytes, UA 3+ (A) Negative   Urinalysis Microscopic   Result Value Ref Range    RBC, UA 9 (H) 0 - 4 /hpf    WBC, UA 22 (H) 0 - 5 /hpf    Bacteria Rare None-Occ /hpf    Squam Epithel, UA 9 /hpf    Hyaline Casts, UA 3 (A) 0-1/lpf /lpf    Microscopic Comment SEE COMMENT    EKG 12-lead   Result Value Ref Range    QRS Duration 70 ms    [facility] QTC Calculation 409 ms        Imaging Results:  Imaging Results              X-Ray Chest AP Portable (Final result)  Result time 04/16/24 13:12:23      Final  result by Romulo Mishra MD (Timothy) (04/16/24 13:12:23)                   Impression:      Negative single view chest x-ray.      Electronically signed by: Romulo Mishra MD  Date:    04/16/2024  Time:    13:12               Narrative:    EXAMINATION:  XR CHEST AP PORTABLE    CLINICAL HISTORY:  <Diagnosis>, Chest Pain;    COMPARISON:  Comparison chest 04/03/2024.    FINDINGS:  Heart size is normal. The lung fields are clear. No acute cardiopulmonary infiltrate.                                       The EKG was ordered, reviewed, and independently interpreted by the ED provider.  Interpretation time: 13:13  Rate: 56 BPM  Rhythm: sinus bradycardia  Interpretation: No acute ST changes. No STEMI.             The Emergency Provider reviewed the vital signs and test results, which are outlined above.     ED Discussion     5:52 PM: Reassessed pt at this time. Discussed with pt all pertinent ED information and results. Discussed pt dx and plan of tx. Gave pt all f/u and return to the ED instructions. All questions and concerns were addressed at this time. Pt expresses understanding of information and instructions, and is comfortable with plan to discharge. Pt is stable for discharge.    I discussed with patient and/or family/caretaker that evaluation in the ED does not suggest any emergent or life threatening medical conditions requiring immediate intervention beyond what was provided in the ED, and I believe patient is safe for discharge.  Regardless, an unremarkable evaluation in the ED does not preclude the development or presence of a serious of life threatening condition. As such, patient was instructed to return immediately for any worsening or change in current symptoms.     ED Course as of 04/16/24 2736   Tue Apr 16, 2024   1402 Cardiac monitor interpretation  Independent interpretation  Indication: Chest pain   normal sinus rhythm, rate 55.  No STEMI [RT]      ED Course User Index  [RT] Jarett Lerner Jr.,  MD     Medical Decision Making  Differential diagnosis:  Pleurisy, pneumonia, ASCVD, cardiac ischemia, chest pain, noncardiac chest pain    Patient was evaluated history physical examination.  She has some chest tightness yesterday after some social issues at home.  EKG and troponin x2 were negative.  Is unlikely cardiac ischemia causing her pain but more than likely anxiety.  She does have urinary tract infection with 22 white cells 3+ leuk esterase.  BNP is only 108.  CMP and CBC are both normal.  Both troponins undetectable at less than 0.006.  Patient stable safe for discharge feels for Keflex at home with close follow-up primary care.  Patient verbalized agreement understanding with the plan of care and is in agreement.      Amount and/or Complexity of Data Reviewed  Independent Historian: friend     Details: Patient was family is at bedside contributing of the history  External Data Reviewed: labs and notes.  Labs: ordered. Decision-making details documented in ED Course.  Radiology: ordered. Decision-making details documented in ED Course.  ECG/medicine tests: ordered and independent interpretation performed. Decision-making details documented in ED Course.    Risk  OTC drugs.  Prescription drug management.  Parenteral controlled substances.  Decision regarding hospitalization.  Risk Details: Morphine was considered however the patient was pain-free on arrival and did not require this               ED Medication(s):  Medications   aspirin tablet 325 mg (325 mg Oral Given 4/16/24 1320)   sodium chloride 0.9% bolus 500 mL 500 mL (0 mLs Intravenous Stopped 4/16/24 1503)   cefTRIAXone (Rocephin) 1 g in dextrose 5 % in water (D5W) 100 mL IVPB (MB+) (0 g Intravenous Stopped 4/16/24 1539)       New Prescriptions    CEPHALEXIN (KEFLEX) 500 MG CAPSULE    Take 1 capsule (500 mg total) by mouth 4 (four) times daily. for 7 days        Follow-up Information       PCP.                                 Scribe Attestation:    Scribe #1: I performed the above scribed service and the documentation accurately describes the services I performed. I attest to the accuracy of the note.     Attending:   Physician Attestation Statement for Scribe #1: I, Jarett Lerner Jr., MD, personally performed the services described in this documentation, as scribed by Ying Aldridge, in my presence, and it is both accurate and complete.           Clinical Impression       ICD-10-CM ICD-9-CM   1. Chest pain, unspecified type  R07.9 786.50   2. Chest pain  R07.9 786.50   3. Acute lower UTI  N39.0 599.0       Disposition:   Disposition: Discharged  Condition: Stable        Jarett Lerner Jr., MD  04/16/24 3483

## 2024-04-17 ENCOUNTER — PATIENT OUTREACH (OUTPATIENT)
Dept: EMERGENCY MEDICINE | Facility: HOSPITAL | Age: 84
End: 2024-04-17
Payer: MEDICARE

## 2024-04-17 LAB
OHS QRS DURATION: 70 MS
OHS QTC CALCULATION: 409 MS

## 2024-04-18 ENCOUNTER — TELEPHONE (OUTPATIENT)
Dept: CARDIOLOGY | Facility: CLINIC | Age: 84
End: 2024-04-18
Payer: MEDICARE

## 2024-04-18 ENCOUNTER — TELEPHONE (OUTPATIENT)
Dept: INTERNAL MEDICINE | Facility: CLINIC | Age: 84
End: 2024-04-18
Payer: MEDICARE

## 2024-04-18 LAB — BACTERIA UR CULT: ABNORMAL

## 2024-04-18 NOTE — TELEPHONE ENCOUNTER
----- Message from Urvashi Valle sent at 4/18/2024  9:56 AM CDT -----  Regarding: Post ED visit follow up appt within 7 days of d/c date 4/16/24  Good morning: Pt was seen in the ED on 4/16/24 for Chest pain; Chest pain, unspecified type; Acute lower UTI. Pt requires a Post ED visit follow up appt within 7 days of d/c date. I spoke with patient and she states that she is in pain and has not seen Dr. Eisenberg in a while and was concerned about getting a timely appt. Please contact pt to schedule a follow up appt either in-office or virtual with any available provider by 4/23/24, if possible. Pt's granddaughter provides transportation.      Urvashi Valle

## 2024-04-18 NOTE — PROGRESS NOTES
Urvashi Valle  ED Navigator  Emergency Department    Project: [facility] ED Navigator  Role: Community Health Worker    Date: 04/18/2024  Patient Name: Leslie Wood  MRN: [id number]  PCP: Mckayla Eisenberg MD    Assessment:     Leslie Wood is a 83 y.o. female who has presented to ED for chest pain, Acute lower UTI. Patient has visited the ED 2 times in the past 3 months. Patient did not contact PCP.     ED Navigator Initial Assessment    ED Navigator Enrollment Documentation  Consent to Services  Does patient consent to completing the assessment?: Yes  Contact  Method of Initial Contact: Phone  Transportation  Does the patient have issues with Transportation?: No  Does the patient have transportation to and from healthcare appointments?: Yes  Insurance Coverage  Do you have coverage/adequate coverage?: Yes  Type/kind of coverage: MSSP  Is patient able to afford co-pays/deductibles?: Yes  Is patient able to afford HME or supplies?: Yes  Does patient have an established [facility] PCP?: Yes  Able to access?: Yes  Does the patient have a lack of adequate coverage?: No  Specialist Appointment  Did the patient come to the ED to see a specialist?: No  Does the patient have a pending specialist referral?: No  Does the patient have a specialist appointment made?: No  PCP Follow Up Appointment  Has the patient had an appointment with a primary care provider in the past year?: Yes  Approximate date: 11/2/23  Provider: Mckayla Eisenberg MD  Does the patient have a follow up appontment with a PCP?: No  When was the last time you saw your PCP?: 11/2/23  Why does the patient not have a follow up scheduled?: Other (see comments)  Medications  Psychological  Food  Communication/Education  Does the patient have limited English proficiency/English not primary language?: No  Does patient have low literacy and/or low health literacy?: Yes  Does patient have concerns with care?: No  Does patient have dissatisfaction with care?: No  Other  Financial Concerns  Other Social Barriers/Concerns  Does the patient have any additional barriers or concerns?: Other (see comments) (Comment: Chronic Conditions)  Primary Barrier  Barriers identified: Cognitive barrier (health literacy, language and communication, etc.)  Root Cause of ED Utilization: Chronic Conditions  Plan to address Chronic Conditions: Schedule appointment for patient with their PCP/specialist per ED discharge instructions  Next steps: Provided Education  Additional Documentation: Pt was seen in the ED on 4/16/24 for chest pain, Acute lower UTI. I spoke with pt for Post ED visit follow up navigation to assist with scheduling a 7-day Post ED visit follow up appt. Pt states that she had not yet contacted pcp to schedule and accepted scheduling assistance. I was unable to schedule appt for pt and sent a request for assistance to pcp.  Pt will be contacted directly for scheduling as pt's granddaughter provides transportation and will schedule as available. Pt has no additional needs at this time.     Urvashi Valle           Social History     Socioeconomic History    Marital status:    Tobacco Use    Smoking status: Never    Smokeless tobacco: Never   Substance and Sexual Activity    Alcohol use: No    Drug use: No    Sexual activity: Not Currently       Plan:   Pt was seen in the ED on 4/16/24 for chest pain, Acute lower UTI. I spoke with pt for Post ED visit follow up navigation to assist with scheduling a 7-day Post ED visit follow up appt. Pt states that she had not yet contacted pcp to schedule and accepted scheduling assistance. I was unable to schedule appt for pt and sent a request for assistance to pcp.  Pt will be contacted directly for scheduling as pt's granddaughter provides transportation and will schedule as available. Pt has no additional needs at this time.     Urvashi Valle    Medicare Status: Enrolled  This patient has a Medicare coverage.    Appointment made with:  Mckayla Eisenberg MD

## 2024-04-18 NOTE — TELEPHONE ENCOUNTER
----- Message from Urvashi Valle sent at 4/18/2024  9:59 AM CDT -----  Regarding: Post ED visit follow up appt - D/C date 4/16/24  Good morning: Pt was seen in the ED on 4/16/24 for Chest pain; Chest pain, unspecified type; Acute lower UTI. I spoke with patient and she states that she is in pain and has not seen Dr. Gomez in a while. I am requesting assistance as I am unable to schedule pt a follow up appt. Please contact pt to schedule a follow up appt either in-office or virtual with any provider at your earliest available date.        Thank you for your assistance,  Urvashi Valle

## 2024-04-23 ENCOUNTER — HOSPITAL ENCOUNTER (OUTPATIENT)
Dept: RADIOLOGY | Facility: HOSPITAL | Age: 84
Discharge: HOME | End: 2024-04-23
Attending: PHYSICIAN ASSISTANT
Payer: MEDICARE

## 2024-04-23 ENCOUNTER — OFFICE VISIT (OUTPATIENT)
Dept: INTERNAL MEDICINE | Facility: CLINIC | Age: 84
End: 2024-04-23
Payer: MEDICARE

## 2024-04-23 VITALS
OXYGEN SATURATION: 95 % | WEIGHT: 133.38 LBS | DIASTOLIC BLOOD PRESSURE: 70 MMHG | SYSTOLIC BLOOD PRESSURE: 110 MMHG | BODY MASS INDEX: 25.2 KG/M2 | HEART RATE: 73 BPM

## 2024-04-23 DIAGNOSIS — N30.00 ACUTE CYSTITIS WITHOUT HEMATURIA: ICD-10-CM

## 2024-04-23 DIAGNOSIS — M46.1 SACROILIITIS, NOT ELSEWHERE CLASSIFIED: ICD-10-CM

## 2024-04-23 DIAGNOSIS — K59.00 CONSTIPATION, UNSPECIFIED CONSTIPATION TYPE: ICD-10-CM

## 2024-04-23 DIAGNOSIS — M54.50 LUMBAR BACK PAIN: Primary | ICD-10-CM

## 2024-04-23 PROCEDURE — 74019 RADEX ABDOMEN 2 VIEWS: CPT | Mod: TC

## 2024-04-23 PROCEDURE — 99214 OFFICE O/P EST MOD 30 MIN: CPT | Mod: S$PBB,,, | Performed by: PHYSICIAN ASSISTANT

## 2024-04-23 PROCEDURE — 99999 PR PBB SHADOW E&M-EST. PATIENT-LVL IV: CPT | Mod: PBBFAC,,, | Performed by: PHYSICIAN ASSISTANT

## 2024-04-23 PROCEDURE — 99214 OFFICE O/P EST MOD 30 MIN: CPT | Mod: PBBFAC,25 | Performed by: PHYSICIAN ASSISTANT

## 2024-04-23 PROCEDURE — 74019 RADEX ABDOMEN 2 VIEWS: CPT | Mod: 26,,, | Performed by: RADIOLOGY

## 2024-04-23 NOTE — PROGRESS NOTES
Subjective:       Patient ID: Leslie Wood is a 83 y.o. female.    Chief Complaint: Follow-up ([facility])      HPI  Granddaughter presents 84 y/o female to clinic for ER f/u on 4/16 for chest pain that started that morning. She had neg CXR, EKG with sinus bradycardia, normal CMP, BNP, troponin X2, CBC. She had 3+ leukocytes on U/A with coag neg staph growth on urine culture. She was given Rocephin 1g and discharged home on Keflex which she has completed. Symptoms have resolved. She continues to have chronic back pain (sees pain mgmt) but reports constipation from medication not relieved with stool softeners.     Review of Systems   Constitutional:  Negative for appetite change, chills, fatigue, fever and unexpected weight change.   Eyes:  Negative for visual disturbance.   Respiratory:  Negative for shortness of breath.    Cardiovascular:  Negative for chest pain.   Gastrointestinal:  Positive for constipation. Negative for abdominal pain, diarrhea, nausea and vomiting.   Musculoskeletal:  Positive for back pain. Negative for myalgias.   Neurological:  Negative for headaches.       Objective:      Physical Exam  Vitals and nursing note reviewed.   Constitutional:       General: She is not in acute distress.     Appearance: She is well-developed.      Comments: Ambulatory with walker    HENT:      Head: Normocephalic and atraumatic.   Eyes:      General: Lids are normal. No scleral icterus.     Extraocular Movements: Extraocular movements intact.      Conjunctiva/sclera: Conjunctivae normal.   Cardiovascular:      Rate and Rhythm: Normal rate and regular rhythm.   Pulmonary:      Effort: Pulmonary effort is normal.      Breath sounds: Normal breath sounds. No decreased breath sounds, wheezing, rhonchi or rales.   Neurological:      Mental Status: She is alert.      Cranial Nerves: No cranial nerve deficit.   Psychiatric:         Mood and Affect: Mood and affect normal.         Assessment:       1. Lumbar back pain     2. Constipation, unspecified constipation type    3. Acute cystitis without hematuria    4. Sacroiliitis, not elsewhere classified        Plan:   1. Lumbar back pain    2. Constipation, unspecified constipation type  -     X-Ray Abdomen Flat And Erect; Future; Expected date: 04/23/2024    3. Acute cystitis without hematuria  -     Urinalysis; Future; Expected date: 04/23/2024    4. Sacroiliitis, not elsewhere classified  Overview:  Followed by Pain Management, continue current treatment plan         Continue f/u with pain mgmt for back pain  Abd XR to r/o obstruction  U/A clear  Handicap tag paperwork completed, indication 6      Lab Results   Component Value Date    COLORU Yellow 04/23/2024    APPEARANCEUA Clear 04/23/2024    PHUR 6.0 04/23/2024    SPECGRAV 1.020 04/23/2024    PROTEINUA Negative 04/23/2024    GLUCUA Negative 04/23/2024    KETONESU Negative 04/23/2024    BILIRUBINUA Negative 04/23/2024    OCCULTUA Negative 04/23/2024    NITRITE Negative 04/23/2024    UROBILINOGEN Negative 04/16/2024    LEUKOCYTESUR Negative 04/23/2024        X-Ray Abdomen Flat And Erect  Narrative: EXAM: XR ABDOMEN FLAT AND ERECT    CLINICAL HISTORY: Constipation    FINDINGS: Spine stimulator noted.  Postoperative changes lower lumbar spine.  Changes from anterior hernia repair.  Postcholecystectomy.  Mild constipation.  There is no radiographic evidence of small bowel obstruction, ileus, or gross free air.  No radiopaque urinary tract calculi are identified.  Vertebroplasty cement suspected T 11.  Impression:  No radiographic evidence of acute abdominal disease.    Finalized on: 4/23/2024 5:25 PM By:  Daniel Penn MD  BR[facility]# [id number]      2024-04-23 17:27:44.745    SUSIE

## 2024-04-24 ENCOUNTER — OFFICE VISIT (OUTPATIENT)
Dept: CARDIOLOGY | Facility: CLINIC | Age: 84
End: 2024-04-24
Payer: MEDICARE

## 2024-04-24 VITALS
SYSTOLIC BLOOD PRESSURE: 126 MMHG | BODY MASS INDEX: 25.27 KG/M2 | DIASTOLIC BLOOD PRESSURE: 67 MMHG | WEIGHT: 133.81 LBS | HEART RATE: 75 BPM | HEIGHT: 61 IN | OXYGEN SATURATION: 92 % | RESPIRATION RATE: 16 BRPM

## 2024-04-24 DIAGNOSIS — Z76.89 ENCOUNTER TO ESTABLISH CARE: Primary | ICD-10-CM

## 2024-04-24 DIAGNOSIS — E11.69 HYPERLIPIDEMIA ASSOCIATED WITH TYPE 2 DIABETES MELLITUS: ICD-10-CM

## 2024-04-24 DIAGNOSIS — I51.81 STRESS-INDUCED CARDIOMYOPATHY: ICD-10-CM

## 2024-04-24 DIAGNOSIS — I70.0 ATHEROSCLEROSIS OF AORTA: ICD-10-CM

## 2024-04-24 DIAGNOSIS — I42.8 NONISCHEMIC CARDIOMYOPATHY: ICD-10-CM

## 2024-04-24 DIAGNOSIS — E78.5 HYPERLIPIDEMIA ASSOCIATED WITH TYPE 2 DIABETES MELLITUS: ICD-10-CM

## 2024-04-24 DIAGNOSIS — Z78.0 MENOPAUSE: ICD-10-CM

## 2024-04-24 DIAGNOSIS — E11.9 TYPE 2 DIABETES MELLITUS WITHOUT COMPLICATION, WITHOUT LONG-TERM CURRENT USE OF INSULIN: ICD-10-CM

## 2024-04-24 DIAGNOSIS — Q24.5 CORONARY-MYOCARDIAL BRIDGE: ICD-10-CM

## 2024-04-24 DIAGNOSIS — I10 ESSENTIAL HYPERTENSION: ICD-10-CM

## 2024-04-24 PROBLEM — Z74.09 DECREASED STRENGTH, ENDURANCE, AND MOBILITY: Status: RESOLVED | Noted: 2023-05-17 | Resolved: 2024-04-24

## 2024-04-24 PROBLEM — R00.1 BRADYCARDIA: Status: RESOLVED | Noted: 2022-12-15 | Resolved: 2024-04-24

## 2024-04-24 PROBLEM — R41.82 ALTERED MENTAL STATE: Status: RESOLVED | Noted: 2022-05-21 | Resolved: 2024-04-24

## 2024-04-24 PROBLEM — R26.9 GAIT ABNORMALITY: Status: RESOLVED | Noted: 2023-12-18 | Resolved: 2024-04-24

## 2024-04-24 PROBLEM — R07.89 ATYPICAL CHEST PAIN: Status: RESOLVED | Noted: 2017-05-17 | Resolved: 2024-04-24

## 2024-04-24 PROBLEM — M62.830 BACK SPASM: Status: RESOLVED | Noted: 2017-05-13 | Resolved: 2024-04-24

## 2024-04-24 PROBLEM — S29.012A UPPER BACK STRAIN: Status: RESOLVED | Noted: 2017-05-13 | Resolved: 2024-04-24

## 2024-04-24 PROBLEM — R52 INTRACTABLE PAIN: Status: RESOLVED | Noted: 2022-11-10 | Resolved: 2024-04-24

## 2024-04-24 PROBLEM — Z87.898 HISTORY OF PREDIABETES: Status: RESOLVED | Noted: 2017-05-11 | Resolved: 2024-04-24

## 2024-04-24 PROBLEM — R79.89 ELEVATED D-DIMER: Status: RESOLVED | Noted: 2017-05-11 | Resolved: 2024-04-24

## 2024-04-24 PROBLEM — U07.1 COVID-19: Status: RESOLVED | Noted: 2020-08-01 | Resolved: 2024-04-24

## 2024-04-24 PROBLEM — D89.89 OTHER SPECIFIED DISORDERS INVOLVING THE IMMUNE MECHANISM, NOT ELSEWHERE CLASSIFIED: Status: RESOLVED | Noted: 2021-03-04 | Resolved: 2024-04-24

## 2024-04-24 PROBLEM — M25.461 EFFUSION OF RIGHT KNEE: Status: RESOLVED | Noted: 2022-11-10 | Resolved: 2024-04-24

## 2024-04-24 PROBLEM — R68.89 DECREASED STRENGTH, ENDURANCE, AND MOBILITY: Status: RESOLVED | Noted: 2023-05-17 | Resolved: 2024-04-24

## 2024-04-24 PROBLEM — R10.9 ABDOMINAL PAIN: Status: RESOLVED | Noted: 2017-05-11 | Resolved: 2024-04-24

## 2024-04-24 PROBLEM — R29.898 DECREASED GRIP STRENGTH OF LEFT HAND: Status: RESOLVED | Noted: 2023-05-17 | Resolved: 2024-04-24

## 2024-04-24 PROBLEM — R47.01 APHASIA: Status: RESOLVED | Noted: 2023-02-02 | Resolved: 2024-04-24

## 2024-04-24 PROBLEM — Z78.9 DECREASED ACTIVITIES OF DAILY LIVING (ADL): Status: RESOLVED | Noted: 2023-05-17 | Resolved: 2024-04-24

## 2024-04-24 PROBLEM — M25.061 HEMARTHROSIS OF RIGHT KNEE: Status: RESOLVED | Noted: 2022-11-10 | Resolved: 2024-04-24

## 2024-04-24 PROBLEM — R41.0 DISORIENTATION: Status: RESOLVED | Noted: 2022-12-16 | Resolved: 2024-04-24

## 2024-04-24 PROBLEM — R53.1 DECREASED STRENGTH, ENDURANCE, AND MOBILITY: Status: RESOLVED | Noted: 2023-05-17 | Resolved: 2024-04-24

## 2024-04-24 PROCEDURE — 99214 OFFICE O/P EST MOD 30 MIN: CPT | Mod: S$PBB,,, | Performed by: INTERNAL MEDICINE

## 2024-04-24 PROCEDURE — 99999 PR PBB SHADOW E&M-EST. PATIENT-LVL IV: CPT | Mod: PBBFAC,,, | Performed by: INTERNAL MEDICINE

## 2024-04-24 PROCEDURE — 99214 OFFICE O/P EST MOD 30 MIN: CPT | Mod: PBBFAC | Performed by: INTERNAL MEDICINE

## 2024-04-24 RX ORDER — NAPROXEN SODIUM 220 MG/1
81 TABLET, FILM COATED ORAL DAILY
COMMUNITY

## 2024-04-24 NOTE — PROGRESS NOTES
Subjective:   Patient ID:  Leslie Wood is a 83 y.o. female who presents for evaluation of No chief complaint on file.    4.24.2024  Was recently in the emergency room for UTI hypertensive emergency.  Did not take her medications that they had social event/stress at home.    She had complain of chest pain her blood pressure was up to 200/95.  Her EKG however was nonischemic as well as his troponins.    She feels well since then.    She is doing well.  She is taking her antibiotics.    Accompanied by by her granddaughter.        2.15.2024  Comes in for six-month follow-up.    She states that 5 days ago she had some midepigastric chest pain.  However EKG looks normal today.    She recently lost her daughter.    She is accompanied by her granddaughter today.    Was 1 episode however none today.    8.30.2023  Comes in for follow-up.    Two weeks cardiac monitor non relevant. no more episodes of TIA  Denies any chest pain, palpitations syncope presyncope   She had her colonoscopy done.    No lower extremity swelling.    Walks with a walker.  Doing occupational therapy.    History of chronic knee pain.    05/04/2023.    Comes in for evaluation for loop recorder.    She had seen neurology.    She had the 14 days monitor did not show any arrhythmias.    She does not have any palpitations.    She is wobbly on her walking.    She is bruising very easily with aspirin.    Her CT scan did not show multiple areas of ischemia to suspect embolic stroke.    She also has history with reveal stricture and 80 is not possible for her.    She states that when she wore the monitor she was and 3 where in the whole time and she is not sure about the quality of the monitor and she also states that she missed the new monitor that was sent to her house.      1.31.2023  She presented last month to the hospital with syncope.  Her beta-blocker dose was decreased.    And was switched from carvedilol to Toprol 25 mg daily.    In the last 2 days  she has been out of her metoprolol.    She states that she is awaiting an appointment in March for evaluation for knee replacement.    She denies any more chest pain.    Her repeat echocardiogram in the hospital showed recovery of her LV EF from 30-40% to 60%.    She denies any dyspnea, chest pain, palpitations, syncope presyncope.        7.27.2022  Comes in for a follow up after recent visit to the ED  She states after she took the first dose of recently prescribed BELBUCA for back pain, she felt immediately dyspnea, resolved in the ED  She had no changes on ekg and two negative troponins with an elevated BNP of 200   She has been off the lasix, baseline BNP is normal   She does reports FUENTES NYHA 1-2 lately, but no orthopnea and no leg swelling     6.6.2022  Comes in for follow-up.  She was admitted twice to the hospital last month with chest pain.  Had 2 heart catheterization.  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  Her EF is low.  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  She denies any more chest pain.  Her memory loss has been worse as per prior visit with her daughter in the hospital.  No bleeding  Also seen Dr. Carl for 2nd opinion    Interval hx: 2/11/2021   Complains of occipital headache  No chest pain, no dyspnea  States she is doing rehab and sometimes her BP is 200   Not taking lasix. On imdur  Esr came back normal after last visit       Initial HPI: 10/1/2020 Patient 80-year-old, prediabetic, with history of hypertension, who recently had a stroke about 3 months ago and was hospitalized at [facility].  She does not recall what kind of cardiac workup she underwent while at that hospital.  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  Her left-sided weakness has improved since the stroke.  She is undergoing rehab.  And should undergo speech therapy soon as she states.  She reports compliance with her  blood pressure medications, however she reports feeling dizzy when she stands up, she does not get dizzy or any other circumstances.  She states that she is not drinking enough water.  Also she complains of bilateral lower extremity mild swelling that comes on and off when she takes or not take her amlodipine.  She denies any chest pain, dyspnea, orthopnea, PND, palpitations, syncope, presyncope, bleeding.                Past Medical History:   Diagnosis Date    Arthritis     Cataract     GERD (gastroesophageal reflux disease)     Heart attack 05/18/2022    Heart attack 05/23/2022    Hyperlipidemia     Hypertension     Stroke        Past Surgical History:   Procedure Laterality Date    ADENOIDECTOMY      ADRENAL GLAND SURGERY      APPENDECTOMY      BACK SURGERY      fusion l 4-5 s 1,2,3  fusion l 2-3    CORONARY ANGIOGRAPHY N/A 05/20/2022    Procedure: ANGIOGRAM, CORONARY ARTERY;  Surgeon: Domingo Martins MD;  Location: [facility] CATH LAB;  Service: Cardiology;  Laterality: N/A;    CORONARY ANGIOGRAPHY N/A 05/24/2022    Procedure: ANGIOGRAM, CORONARY ARTERY;  Surgeon: Domingo Martins MD;  Location: [facility] CATH LAB;  Service: Cardiology;  Laterality: N/A;    EYE SURGERY      HEMORRHOID SURGERY      HERNIA REPAIR      HYSTERECTOMY      partial    indirect lumbar decompression      percutaneous placement of interspinous extension blocker    LEFT HEART CATHETERIZATION Left 05/20/2022    Procedure: CATHETERIZATION, HEART, LEFT;  Surgeon: Domingo Martins MD;  Location: [facility] CATH LAB;  Service: Cardiology;  Laterality: Left;    TONSILLECTOMY         Social History     Tobacco Use    Smoking status: Never    Smokeless tobacco: Never   Substance Use Topics    Alcohol use: No    Drug use: No       Family History   Problem Relation Name Age of Onset    Heart disease Mother      Hypertension Mother      Diabetes Mother      Hypertension Father      Kidney disease Father      Breast cancer Sister         Review of Systems    Constitutional: Negative for fever and malaise/fatigue.   HENT: Negative for sore throat.    Eyes: Negative for blurred vision.   Cardiovascular: Negative for chest pain, claudication, cyanosis, dyspnea on exertion, irregular heartbeat, leg swelling, near-syncope, orthopnea, palpitations, paroxysmal nocturnal dyspnea and syncope.         Current Outpatient Medications   Medication Sig Dispense Refill    aspirin 81 MG Chew Take 81 mg by mouth once daily.      atorvastatin (LIPITOR) 10 MG tablet Take 1 tablet (10 mg total) by mouth every evening. 90 tablet 3    DULoxetine (CYMBALTA) 30 MG capsule Take 30 mg by mouth once daily.      furosemide (LASIX) 20 MG tablet TAKE 1 TABLET BY MOUTH ONCE A DAY 30 tablet 11    losartan (COZAAR) 50 MG tablet TAKE 1 TABLET BY MOUTH TWICE DAILY 180 tablet 1    metoprolol succinate (TOPROL-XL) 25 MG 24 hr tablet Take 1 tablet (25 mg total) by mouth once daily. 90 tablet 3    mupirocin (BACTROBAN) 2 % ointment Apply topically 2 (two) times daily. 15 g 0    NUCYNTA 50 mg Tab Take 1 tablet (50 mg total) by mouth every 8 (eight) hours as needed (severe pain). RESTART WHEN OK PER PAIN MANAGEMENT PROVIDER 1 tablet 0    polyethylene glycol (GLYCOLAX) 17 gram PwPk Take 17 g by mouth once daily. 30 each 0    tiZANidine (ZANAFLEX) 4 MG tablet        No current facility-administered medications for this visit.       Objective:   Objective:  Wt Readings from Last 3 Encounters:   04/24/24 60.7 kg (133 lb 13.1 oz)   04/23/24 60.5 kg (133 lb 6.1 oz)   04/03/24 61 kg (134 lb 7.7 oz)     Temp Readings from Last 3 Encounters:   04/16/24 98.4 °F (36.9 °C) (Oral)   04/04/24 98.1 °F (36.7 °C) (Oral)   02/25/24 98.3 °F (36.8 °C) (Oral)     BP Readings from Last 3 Encounters:   04/24/24 126/67   04/23/24 110/70   04/16/24 (!) 164/72     Pulse Readings from Last 3 Encounters:   04/24/24 75   04/23/24 73   04/16/24 61       Physical Exam  Vitals reviewed.   Constitutional:       Appearance: She is  well-developed.   HENT:      Head: Normocephalic and atraumatic.   Eyes:      General: No scleral icterus.     Conjunctiva/sclera: Conjunctivae normal.   Cardiovascular:      Rate and Rhythm: Normal rate and regular rhythm.      Pulses: Intact distal pulses.      Heart sounds: Normal heart sounds. No murmur heard.           Lab Results   Component Value Date    CHOL 128 04/03/2024    CHOL 139 11/08/2023    CHOL 96 (L) 02/01/2023     Lab Results   Component Value Date    HDL 46 04/03/2024    HDL 45 11/08/2023    HDL 47 02/01/2023     Lab Results   Component Value Date    LDLCALC 66.4 04/03/2024    LDLCALC 64.8 11/08/2023    LDLCALC 38.8 (L) 02/01/2023     Lab Results   Component Value Date    TRIG 78 04/03/2024    TRIG 146 11/08/2023    TRIG 51 02/01/2023     Lab Results   Component Value Date    CHOLHDL 35.9 04/03/2024    CHOLHDL 32.4 11/08/2023    CHOLHDL 49.0 02/01/2023       Chemistry        Component Value Date/Time     04/16/2024 1319    K 4.6 04/16/2024 1319     04/16/2024 1319    CO2 27 04/16/2024 1319    BUN 16 04/16/2024 1319    CREATININE 0.8 04/16/2024 1319     04/16/2024 1319        Component Value Date/Time    CALCIUM 10.0 04/16/2024 1319    ALKPHOS 83 04/16/2024 1319    AST 23 04/16/2024 1319    ALT 20 04/16/2024 1319    BILITOT 0.5 04/16/2024 1319    ESTGFRAFRICA >60 07/23/2022 2111    EGFRNONAA >60 07/23/2022 2111          Lab Results   Component Value Date    TSH 1.388 11/08/2023     Lab Results   Component Value Date    INR 1.0 02/02/2023    INR 1.1 05/24/2022    INR 1.0 05/24/2022     Lab Results   Component Value Date    WBC 6.03 04/16/2024    HGB 13.4 04/16/2024    HCT 41.9 04/16/2024    MCV 93 04/16/2024     04/16/2024     BNP  @LABRCNTIP(BNP,BNPTRIAGEBLO)@  CrCl cannot be calculated (Patient's most recent lab result is older than the maximum 7 days allowed.).     Imaging:  ======  Results for orders placed during the hospital encounter of 03/02/20   Echo Color Flow  Doppler? Yes    Narrative · Mild concentric left ventricular hypertrophy.  · Normal left ventricular systolic function. The estimated ejection   fraction is 60%.  · Normal LV diastolic function.  · Normal right ventricular systolic function.  · Mild aortic regurgitation.  · Normal central venous pressure (3 mmHg).  · Trivial pericardial effusion.        No results found for this or any previous visit.  No results found for this or any previous visit.  Results for orders placed during the hospital encounter of 05/11/17   X-Ray Chest PA And Lateral    Narrative XR CHEST PA AND LATERAL, 05/11/17 11:38:46    Clinical indication: Chest pain.    Findings:  Comparison with 05/10/2017.    Epidural leads within the dorsal thoracic spine.  Lower thoracic wedge compression fracture status post kyphoplasty.  Left upper quadrant surgical clips.    Heart size is normal.  Prominent left pericardial fat pad.    Aortic arch calcification.    Lung fields remain clear.    Impression      Stable chest x-ray.  No acute findings.      Electronically signed by: EMERY SAMAYOA MD  Date:     05/11/17  Time:    12:07      No results found for this or any previous visit.  No procedure found.    Diagnostic Results:  ECG: Reviewed  Marked sinus bradycardia with sinus arrhythmia   Abnormal ECG   When compared with ECG of 28-OCT-2020 16:09,   Criteria for Septal infarct are no longer Present   T wave inversion no longer evident in Anterior leads   Confirmed by MD RODERICK, BARB ([id number]) on 11/12/2020 9:25:42 PM       Results for orders placed during the hospital encounter of 05/24/22    Cardiac catheterization    Conclusion  · The estimated blood loss was none.  · There is severe mid LAD intramyocardial bridging improved with betablockers intra op  · There was no evidence of an acute atherothrombotic lesion    The procedure log was documented by Documenter: Te Waller and verified by Domingo Martins MD.    Date: 5/24/2022  Time: 9:49  AM    The ASCVD Risk score (Karissa BROWN, et al., 2019) failed to calculate for the following reasons:    The 2019 ASCVD risk score is only valid for ages 40 to 79    The patient has a prior MI or stroke diagnosis    Assessment and Plan:   Encounter to establish care    Nonischemic cardiomyopathy    Atherosclerosis of aorta    Essential hypertension    Hyperlipidemia associated with type 2 diabetes mellitus    Coronary-myocardial bridge    Stress-induced cardiomyopathy    Type 2 diabetes mellitus without complication, without long-term current use of insulin          Reviewed all tests and above medical conditions with patient in detail and formulated treatment plan.  Recent hospital stay with normal EKG and normal troponin.  Risk factor modification discussed.   Cardiac low salt diet discussed.  Maintaining healthy weight and weight loss goals were discussed in clinic.  Continue with Toprol.  Nonobstructive catheterization in 2022.  Done twice.    EKG normal today.  We will get a troponin.    Recovered nonischemic cardiomyopathy likely secondary to stress  Okay to take an extra dose of Toprol if blood pressure elevates.  However also seek emergency room visit if fails to improve.        Follow up in 6 months

## 2024-04-25 ENCOUNTER — TELEPHONE (OUTPATIENT)
Dept: ORTHOPEDICS | Facility: CLINIC | Age: 84
End: 2024-04-25

## 2024-04-25 ENCOUNTER — OFFICE VISIT (OUTPATIENT)
Dept: ORTHOPEDICS | Facility: CLINIC | Age: 84
End: 2024-04-25
Payer: MEDICARE

## 2024-04-25 VITALS — BODY MASS INDEX: 25.27 KG/M2 | WEIGHT: 133.81 LBS | HEIGHT: 61 IN

## 2024-04-25 DIAGNOSIS — M23.51 RECURRENT RIGHT KNEE INSTABILITY: ICD-10-CM

## 2024-04-25 DIAGNOSIS — M17.11 PRIMARY OSTEOARTHRITIS OF RIGHT KNEE: Primary | ICD-10-CM

## 2024-04-25 DIAGNOSIS — Z98.1 HISTORY OF LUMBAR FUSION: ICD-10-CM

## 2024-04-25 DIAGNOSIS — M17.11 ARTHRITIS OF KNEE, RIGHT: Primary | ICD-10-CM

## 2024-04-25 PROCEDURE — 99999PBSHW PR PBB SHADOW TECHNICAL ONLY FILED TO HB: Mod: JZ,PBBFAC,,

## 2024-04-25 PROCEDURE — 99999 PR PBB SHADOW E&M-EST. PATIENT-LVL III: CPT | Mod: PBBFAC,,, | Performed by: ORTHOPAEDIC SURGERY

## 2024-04-25 PROCEDURE — 99213 OFFICE O/P EST LOW 20 MIN: CPT | Mod: 25,S$PBB,, | Performed by: ORTHOPAEDIC SURGERY

## 2024-04-25 PROCEDURE — 99213 OFFICE O/P EST LOW 20 MIN: CPT | Mod: PBBFAC,25 | Performed by: ORTHOPAEDIC SURGERY

## 2024-04-25 PROCEDURE — 20610 DRAIN/INJ JOINT/BURSA W/O US: CPT | Mod: PBBFAC | Performed by: ORTHOPAEDIC SURGERY

## 2024-04-25 RX ADMIN — TRIAMCINOLONE ACETONIDE EXTENDED-RELEASE INJECTABLE SUSPENSION 32 MG: KIT INTRA-ARTICULAR at 09:04

## 2024-04-25 NOTE — PROGRESS NOTES
Subjective:     Patient ID: Leslie Wood is a 83 y.o. female.    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  She had a CT scan which I reviewed of her head that showed that she had a stroke and we do not think that she is a candidate for total knee replacement due to medical conditions.  I did tell him anesthesia might bring her level of function to 1 level lower than when she was preop and we should avoid it at this time.  She needs to recuperate completely from her stroke.  Her neurologist at told what ever she gets after 1 year in of therapy is there is what she is going to get.  She is going to physical therapy at the [facility] and they want something about her knee if they can strengthen that.  She does have a brace for her back that she wears but not for her knee.  She uses a walker to get around.  She is here with her granddaughter and we discussed natural products.  She knows she cannot take NSAIDs because she is on Plavix and will injure her stomach and history of GERD the.  She needs to take natural with stuff.  In the future I did tell her steroid might be the only option for her in may end up being prednisone pills when time comes in the future   She denies loss of bowel bladder control she is alert today and oriented and answering questions appropriately  Granddaughter is here with her helping.      05/19/2023   Severe right knee pain   Scratched by a dog on the left calf that is painful   Patient stated that the right knee Depo-Medrol injection given 03/24/2023 wore out 2 weeks ago.  She is willing to proceed with viscosupplementation since we are avoiding having surgery at all cost.  She stated the dog scratched her and ran into her and caused some bruising and worried about an area that seems  to be hard and inflamed.  She is using her Rollator to get around.  She is not a surgical candidate due to medical issues.  She can not take NSAIDs she is on Plavix and history of GERD   She is here with granddaughter very pleasant alert oriented telling jokes.    07/06/2023   Severe right knee arthritis  Cellulitis by a dog scratch which we treated last time with antibiotics and that is completely healed  The severe right knee arthritis we can do much said trying different injections and she is going to physical therapy who they recommended maybe bracing.  I did tell her skin is too thin and the brace might rub and create an issue.  She is using a walker to walk around she does see pain management including doctors [facility] and  [facility]  Complaining of severe pain to the back which I told her I can not help her with in her pain is 8/10.  The knee nenita and subluxes and she has pain throughout most pronounced right now on the anterolateral aspect where she points at.  I reviewed her medications and her medical conditions with her and her granddaughter  No fever no chills no shortness of breath no difficulty with chewing swallowing loss of bowel bladder control or blurry vision double vision loss sense smell or taste      10/09/2023   Right knee severe arthritis  The cellulitis from a dog scratch all resolved   Seen recently by Cardiology Dr. Martins and she was told if she wants to have surgery she can.  She is thinking about not be able to tolerate the pain she is in right now.  There is injections they all lot last maybe 2 weeks.  The last Kenalog injection barely lasted 2 weeks.  She is looking that maybe she can have her surgery we discussed it today with granddaughter and the patient and showed her on the model and showed her x-rays of total knees.  I did tell her it takes roughly 3-6 months for complete healing to occur.  We have tried almost everything except Zilretta.  We will see if that  helps long enough so we can avoid surgery.  Patient wants to have her surgery done but would like to still try 1 more thing prior to undergoing surgery.      No fever no chills no shortness of breath.  She uses a Rollator to walk around      10/23/2023   Right knee severe arthritis  The cellulitis resolved from dog scratch  Today she is wearing a brace and she states that could slide down and she has to tighten it up a little bit.  I did tell her the wrap around brace is much easier to put on than the pull up braces.  She is using the walker to get around.  The neurologist saw her in wants a to undergo OT and PT as well as speech therapy she is about to start that   The injection given last time did not seem to last too long despite the fact she states her pain is 0/10   We will give Zilretta today since she change her mind about having surgery/right TKA.  I did tell her some of her pains are coming from her back she does have lumbar fusion and nerve stimulator in the lumbar spine   She walks with a Rollator  She is here with her daughter and granddaughter    01/25/2024   Right knee severe arthritis  The cellulitis from the dog scratched has resolved   She stated Zilretta seems to work really well for her.  She finally got better until a week ago.  Now her pain is 8/10.  She very rarely takes any Tylenol.  She has using a Rollator to get around.  She is going to physical therapy and dry needling seems to help  No fever no chills no shortness of breath  She is here with family    04/25/2024   Cellulitis from the dog scratched completely resolved   She had fallen tripping over a curb few weeks ago but she only have a small abrasion on the right tibia.  It has not bothering her.    Right knee arthritis and seems to be the Zilretta helped significantly.  She said she started to hurt at this time at 2/10.  She would like to keep on receiving the injections and keep her walking.  She does use her Rollator.  She does have  a lumbar spine stimulator from previous lumbar fusion.  She is avoiding surgery at this time she is 83 years of age as long as injections help we will avoid TKA  No fever no chills no shortness of breath or difficulty with chewing swallowing loss of bowel bladder control blurry vision double vision loss sense smell taste  She had history of UTI couple weeks ago and finished her antibiotics  Past Medical History:   Diagnosis Date    Arthritis     Cataract     GERD (gastroesophageal reflux disease)     Heart attack 05/18/2022    Heart attack 05/23/2022    Hyperlipidemia     Hypertension     Stroke      Past Surgical History:   Procedure Laterality Date    ADENOIDECTOMY      ADRENAL GLAND SURGERY      APPENDECTOMY      BACK SURGERY      fusion l 4-5 s 1,2,3  fusion l 2-3    CORONARY ANGIOGRAPHY N/A 05/20/2022    Procedure: ANGIOGRAM, CORONARY ARTERY;  Surgeon: Domingo Martins MD;  Location: [facility] CATH LAB;  Service: Cardiology;  Laterality: N/A;    CORONARY ANGIOGRAPHY N/A 05/24/2022    Procedure: ANGIOGRAM, CORONARY ARTERY;  Surgeon: Domingo Martins MD;  Location: [facility] CATH LAB;  Service: Cardiology;  Laterality: N/A;    EYE SURGERY      HEMORRHOID SURGERY      HERNIA REPAIR      HYSTERECTOMY      partial    indirect lumbar decompression      percutaneous placement of interspinous extension blocker    LEFT HEART CATHETERIZATION Left 05/20/2022    Procedure: CATHETERIZATION, HEART, LEFT;  Surgeon: Domingo Martins MD;  Location: [facility] CATH LAB;  Service: Cardiology;  Laterality: Left;    TONSILLECTOMY       Family History   Problem Relation Name Age of Onset    Heart disease Mother      Hypertension Mother      Diabetes Mother      Hypertension Father      Kidney disease Father      Breast cancer Sister       Social History     Socioeconomic History    Marital status:    Tobacco Use    Smoking status: Never    Smokeless tobacco: Never   Substance and Sexual Activity    Alcohol use: No    Drug use: No     Sexual activity: Not Currently     Medication List with Changes/Refills   Current Medications    ASPIRIN (ECOTRIN) 81 MG EC TABLET    Take 1 tablet (81 mg total) by mouth once daily.    ASPIRIN 81 MG CHEW    Take 81 mg by mouth once daily.    ATORVASTATIN (LIPITOR) 10 MG TABLET    Take 1 tablet (10 mg total) by mouth every evening.    DULOXETINE (CYMBALTA) 30 MG CAPSULE    Take 30 mg by mouth once daily.    FUROSEMIDE (LASIX) 20 MG TABLET    TAKE 1 TABLET BY MOUTH ONCE A DAY    LOSARTAN (COZAAR) 50 MG TABLET    TAKE 1 TABLET BY MOUTH TWICE DAILY    METOPROLOL SUCCINATE (TOPROL-XL) 25 MG 24 HR TABLET    Take 1 tablet (25 mg total) by mouth once daily.    MUPIROCIN (BACTROBAN) 2 % OINTMENT    Apply topically 2 (two) times daily.    NUCYNTA 50 MG TAB    Take 1 tablet (50 mg total) by mouth every 8 (eight) hours as needed (severe pain). RESTART WHEN OK PER PAIN MANAGEMENT PROVIDER    POLYETHYLENE GLYCOL (GLYCOLAX) 17 GRAM PWPK    Take 17 g by mouth once daily.    TIZANIDINE (ZANAFLEX) 4 MG TABLET         Review of patient's allergies indicates:   Allergen Reactions    Amitriptyline     Hydrochlorothiazide      Causes muscle cramping    Lisinopril      hyperkalemia    Opioids - morphine analogues Hallucinations     Chest pain and hallucinations    Oxycodone     Percocet [oxycodone-acetaminophen] Other (See Comments)     Seizures    Belbuca [buprenorphine hcl] Nausea And Vomiting and Other (See Comments)     Black out     Codeine Nausea Only and Rash    Prazosin Other (See Comments)     dizziness     Review of Systems   Constitutional: Negative for decreased appetite.   HENT:  Negative for tinnitus.    Eyes:  Negative for double vision.   Cardiovascular:  Negative for chest pain.   Respiratory:  Negative for wheezing.    Hematologic/Lymphatic: Negative for bleeding problem.   Skin:  Negative for dry skin.   Musculoskeletal:  Positive for arthritis, back pain, joint pain and muscle weakness. Negative for gout, neck pain  and stiffness.   Gastrointestinal:  Negative for abdominal pain.   Genitourinary:  Negative for bladder incontinence.   Neurological:  Negative for numbness, paresthesias and sensory change.   Psychiatric/Behavioral:  Negative for altered mental status.        Objective:   Body mass index is 25.28 kg/m².  There were no vitals filed for this visit.         General    Constitutional: She is oriented to person, place, and time. She appears well-developed.   HENT:   Head: Atraumatic.   Eyes: EOM are normal.   Pulmonary/Chest: Effort normal.   Neurological: She is alert and oriented to person, place, and time.   Psychiatric: Judgment normal.           Ambulating well with a walker very steady  In the sitting position her pelvis is level  Bilateral hips passive motion without pain in the groin.    Hip flexors and abductors and adductors are slightly weak at 5-/5   Right knee with mild to moderate swelling.  There is no warmness to touch.  There is crepitus with motion.  She has 5-120 degrees and range of motion.  No defect in the patella or quadriceps tendon.  There is weakness around 4/5 in the quads and hamstrings.  Pain is throughout the knee joint .  Slightly loose lateral collateral to valgus and varus stressing with subluxation and clicking felt today  Left knee mild degeneration and mild tenderness.  There is no swelling.  Good motion 0-125.  No defect in the patella or quadriceps tendon   Left calf with scratches on the medial aspect proximal 3rd.  Resolved .   There is ecchymosis around the area also resolved.    Right tibia There is a 3 cm scratched over the anterior tibia mid shaft without evidence of infection or erythema  Calves are soft bilaterally and nontender  There is multiple varicosities in the lower extremity  Skin over the knees within normal limits no obvious lesions    Relevant imaging results reviewed and interpreted by me, discussed with the patient and / or family today   X-ray 01/25/2024 right  knee with complete loss of lateral joint space with severe valgus deformity marginal osteophytes sclerosis of the knee.  The left knee joint space is maintained mild degenerative changes  X-ray 11/10/2022 of the hips within normal limits no evidence of arthritis  X-ray of the knees I 11/10/2022 on the right side showing there is a suprapatellar hematoma there is marginal osteophytic changes there is medial subluxation of the femur on the tibia consistent with severe arthritic change.  Assessment:     Encounter Diagnoses   Name Primary?    Arthritis of knee, right Yes    Recurrent right knee instability     History of lumbar fusion         Plan:   Arthritis of knee, right  -     Large Joint Aspiration/Injection: R knee    Recurrent right knee instability    History of lumbar fusion         Patient Instructions   Injection of the right knee performed with Zilretta today 04/25/2024   Ice the needed next few days if it bothers you you can still take Tylenol 650 mg 3 times a day if needed   Continue with the Rollator  I will see you back in 3 months        Disclaimer: This note was prepared using a voice recognition system and is likely to have sound alike errors within the text.

## 2024-04-25 NOTE — PROCEDURES
Large Joint Aspiration/Injection: R knee    Date/Time: 4/25/2024 9:40 AM    Performed by: Luis Ibarra MD  Authorized by: Luis Ibarra MD    Consent Done?:  Yes (Verbal)  Indications:  Arthritis and pain  Site marked: the procedure site was marked    Timeout: prior to procedure the correct patient, procedure, and site was verified    Prep: patient was prepped and draped in usual sterile fashion    Local anesthesia used?: No    Local anesthetic:  Topical anesthetic    Details:  Needle Size:  22 G  Ultrasonic Guidance for needle placement?: No    Approach:  Anterolateral  Location:  Knee  Site:  R knee  Medications:  32 mg triamcinolone acetonide 32 mg  Patient tolerance:  Patient tolerated the procedure well with no immediate complications     Right Bryan

## 2024-04-25 NOTE — PATIENT INSTRUCTIONS
Injection of the right knee performed with Zilretta today 04/25/2024   Ice the needed next few days if it bothers you you can still take Tylenol 650 mg 3 times a day if needed   Continue with the Rollator  I will see you back in 3 months

## 2024-04-30 ENCOUNTER — EXTERNAL CHRONIC CARE MANAGEMENT (OUTPATIENT)
Dept: PRIMARY CARE CLINIC | Facility: CLINIC | Age: 84
End: 2024-04-30
Payer: MEDICARE

## 2024-04-30 PROCEDURE — 99490 CHRNC CARE MGMT STAFF 1ST 20: CPT | Mod: PBBFAC | Performed by: FAMILY MEDICINE

## 2024-04-30 PROCEDURE — 99490 CHRNC CARE MGMT STAFF 1ST 20: CPT | Mod: S$PBB,,, | Performed by: FAMILY MEDICINE

## 2024-05-09 ENCOUNTER — CLINICAL SUPPORT (OUTPATIENT)
Dept: REHABILITATION | Facility: HOSPITAL | Age: 84
End: 2024-05-09
Payer: MEDICARE

## 2024-05-09 DIAGNOSIS — R41.841 COGNITIVE COMMUNICATION DISORDER: Primary | ICD-10-CM

## 2024-05-09 PROCEDURE — 92507 TX SP LANG VOICE COMM INDIV: CPT | Mod: KX

## 2024-05-09 NOTE — PROGRESS NOTES
[facility]  Speech Therapy Treatment Note- Neurological Rehabilitation  Date: 5/9/2024     Name: Leslie Wood   MRN: [id number]   Therapy Diagnosis:   Encounter Diagnosis   Name Primary?    Cognitive communication disorder Yes     Physician: Marily Millan NP  Physician Orders: Ambulatory Referral to Speech Therapy   Medical Diagnosis: Disorientation [R41.0], Aphasia [R47.01]     Visit #/ Visits Authorized: 13/20 (+eval, +5)  Date of Evaluation:  11/8/23  Insurance Authorization Period: 11/15/2023 - 12/31/2023   Plan of Care Expiration Date:    1/31/24  Extended Plan of Care:  see updated POC  Progress Note: see updated POC    Time In:  10:35 AM  Time Out:  11:15 AM  Total Billable Time: 40 mins      Precautions: Standard, Fall, Cognition, and Communication  Subjective:   Patient reports: she has been staying between "[employer]s house and Mis's house. She would like to return to her home but she is aware that is not safe.   She was compliant to home exercise program.     Response to previous treatment: good    Pain Scale: 8/10 on a Visual Analog Scale currently.  Pain Location: lower back  Objective:   TIMED  Procedure Min.   Cognitive Therapeutic Interventions, first 15 minutes CPT 61503  15   Cognitive Therapeutic Interventions, each additional 15 minutes CPT 51545  25           Short Term Goals: (6 weeks) Current Progress:   Patient will sustain attention to a simple task (auditory or visual) for 3 minutes with 90% accuracy or no more than 2 redirections.      Goal Met 12/13/2023   Auditory: met x2  Visual: met x7 Auditory Sustained Attention:  Several sustained auditory attention tasks completed in today's session. Patient listened to multi-step commands and followed with 91% accuracy. She required initial verbal cueing for use of strategies including taking requesting repetition as needed. Improved use of strategies independently with overall improved accuracy in today's session.    Visual  Sustained Attention:  Patient completed several visual sustained attention tasks in today's session in which she visually scanned for letters/numbers in a paragraph.   Trial 1: 1:30, 100% accuracy, 1/7 relative scale of cognitive load   Trial 2: 1:45, 100% accuracy, 1/7 relative scale of cognitive load   Trial 3: 1:30, 100% accuracy, 1/7 relative scale of cognitive load    Trial 4: 2:08, -7 (missed entire row of scanning)   Trial 5: 2:10, 100% accuracy, 1/7 relative scale of cognitive load   Trial 6: 1:30, 100% accuracy, 1/7 relative scale of cognitive load     Overall improved use of strategies to facilitate visual scanning pattern as well as good awareness of errors. Overall high accuracy of completion with low cognitive load.       2. Patient will complete selective attention tasks (auditory or visual) with 85% accuracy independently increase selective attention.                                                GOAL MET 3/18/2024 Auditory Selective Attention:  Patient listened for and followed complex 2 step commands in a dynamic environment with auditory and visual distractions during completion. She followed commands with 92% accuracy and initial verbal cueing for use of strategies. Discussion of how this relates to conversation and asking clarifying questions to facilitate attention and information processing. She rated task as 2/7 on relative scale of cognitive load.    She then listened to a 5 minute video about how basketballs are made. She requested to take notes as needed and paused/rewound video as needed. She answered questions about what she heard with 92% accuracy and rated task as 2/7 on relative scale of cognitive load.     Visual Selective Attention:  Patient participated in visual selective attention task of N-Back (level 1) pictures with natural background noise (therapy door remained opened.   Trial 1: Patient completed task with 86% accuracy given minimal cueing from clinician  Trial 2: Patient  completed task with 88% accuracy independently.     GOAL MET 3/18/2024   3. Patient will use attention shifting strategies to shift attention between two tasks (auditory or visual) with no more than 3 cues or 90% accuracy to improve alternating attention.              Auditory: GOAL MET 4/11/2024    Progressing/ Not Met 5/9/2024  Visual Alternating Attention:   Not formally addressed.    4. Patient will recall functional information (name, birthday, address, etc) following delays of 2-3 minutes following a prompt questions across 3 therapy sessions with 80% accuracy (spaced retrieval).      Progressing/ Not Met 5/9/2024   Not formally addressed     5. Patient will complete a functional task to improve attention, memory and/or executive functions (I.e. sample bill paying activity, recipe, or multiple choice comprehension questions to 1 paragraphs) with 80% accuracy and natural environment noise distractions (TV news background, music, etc.).      Progressing/ Not Met 5/9/2024    Not formally addressed     6. Patient will be educated regarding cognitive deficits as applicable to the patient's life for increased awareness and will execute returned demonstration of information with 80% accuracy.       Goal Met 1/24/2024  Patient and her granddaughter again educated regarding nature of cognitive deficits including a variety of foundational cognitive elements related to R hemisphere dysfunction following CVA including the cognitive triangle (with emphasis on foundational versus higher level cognitive functions, awareness of deficits, impact of attention, information processing, memory, and executive functioning deficits as each area relates to assessment findings), spoon theory to discuss cognitive versus physical versus emotional fatigue and management of each to more efficiently use energy daily for more or less cognitively demanding tasks, internal versus external distractions (including emotions, pain, stressors, etc)  and management of each, as well as overall impact of these deficits on daily functioning. Patient demonstrated awareness of information provided as demonstrated by asking good questions and expressing understanding. Discussed specifically use of mindfulness and meditation/breathing strategies to improve cognitive functioning related to internal, specifically emotional stressors.       7. Patient will participate in continued cognitive assessment of right hemisphere dysfunction.     Goal Met 11/24/2023         GOAL MET 11/24/2023     Given multiple sites of lesion, is it difficult to determine true language deficits from cognitive deficits. However, patient with word finding deficits at the conversational level and with confrontational naming task. Therefore, the WAB-R was given today to further assess language.     Western Aphasia Battery - Revised (WAB-R) was administered to evaluate the patient's receptive and expressive language function.The purpose stated in the manual for the WAB-R is to determine the presence, severity, and type of aphasia; measure the patient's level of performance to provide a baseline for detecting change over time; provide a comprehensive assessment of the patients language strengths and deficits in order to guide treatment and management; infer the location and etiology of the lesion causing the aphasia.  The following results were revealed:     DOMAIN SCORE   Spontaneous Speech Score 13/20   Auditory Comprehension Score  8.6/10   Repetition Score  9.2/10   Naming and Word Finding Score 7.7/10   Aphasia Quotient (AQ) 77/100   Aphasia Classification  Moderate Anomic Aphasia     SUBTEST SCORE   Information Content 7/10   Fluency, Grammatical, Competence, and Paraphasias  6/10   Yes/No Questions 60/60   Auditory Word Recognition 57/10   Sequential Commands 55/80   Repetition 92/100   Object Naming 54/60   Word Fluency 5/20   Sentence Completion 10/10   Responsive Speech  8/10       The  patient presents with a moderate anomic aphasia characterized by halting speech, difficulty with confrontational naming tasks, and deficits in auditory comprehension that are appreciated with increased complexity of task (yes/no questions versus sequential commands).       This patient has multiple sites of lesion from several cerebral vascular accidents and thus it is extremely difficulty to evaluate language abilities without taking into consideration the cognitive impact on these scores. The patient has a known right hemisphere cerebral vascular accident and a suspected left hemisphere cerebral vascular accident which was unable to be confirmed with imaging due to incompatibility with MRI (pacemaker).   However, the patient did have right sided weakness and aphasia during her hospital stay prompting the suspicion of a left hemisphere cerebral vascular accident. The patient endorses both word finding deficits and cognitive-communication deficits. It is likely that both a true language disorder and a cognitive-communication disorder are present although it is difficult to assess each independently.     Patient Education/Response:   Patient educated regarding the followin. Updated POC  2. How sleep may impact overall cognitive-communication functioning.   3. How pain may impact overall cognitive-communication functioning.     Home program established: Patient instructed to continue prior program  Patient verbalized understanding to all above education provided.     See Electronic Medical Record under Patient Instructions for exercises provided throughout therapy.  Assessment:   Patient is progressing steadily towards goals. She has not been to therapy since 4/15/24. Plan of care updated today as well as re-assessment of language. See updated POC.     Leslie is progressing well towards her goals. Current goals remain appropriate. Goals to be updated as necessary.     Patient prognosis is Fair. Patient will  continue to benefit from skilled outpatient speech and language therapy to address the deficits listed in the problem list on initial evaluation, provide patient/family education and to maximize patient's level of independence in the home and community environment.     Medical necessity is demonstrated by the following IMPAIRMENTS:  Cognition: Deficits in executive functioning, attention, and memory prevent the pt from relaying medically and safety relevant information in a timely manner in a state of emergency.     Barriers to Therapy: NA  Patient's spiritual, cultural and educational needs considered and patient agreeable to plan of care and goals.  Plan:   Continue Plan of Care with focus on rehabilitation and compensation for attention, memory, and executive functioning deficits impacting safety and efficiency of daily task completion.     Becca Blanco, CCC-SLP, CBIS   Speech Language Pathologist   Certified Brain Injury Specialist   5/9/2024

## 2024-05-13 ENCOUNTER — CLINICAL SUPPORT (OUTPATIENT)
Dept: REHABILITATION | Facility: HOSPITAL | Age: 84
End: 2024-05-13
Payer: MEDICARE

## 2024-05-13 DIAGNOSIS — R68.89 DECREASED STRENGTH, ENDURANCE, AND MOBILITY: ICD-10-CM

## 2024-05-13 DIAGNOSIS — Z74.09 DECREASED STRENGTH, ENDURANCE, AND MOBILITY: ICD-10-CM

## 2024-05-13 DIAGNOSIS — R29.818 FINE MOTOR IMPAIRMENT: ICD-10-CM

## 2024-05-13 DIAGNOSIS — I69.354 HEMIPLEGIA AND HEMIPARESIS FOLLOWING CEREBRAL INFARCTION AFFECTING LEFT NON-DOMINANT SIDE: ICD-10-CM

## 2024-05-13 DIAGNOSIS — Z78.9 DECREASED ACTIVITIES OF DAILY LIVING (ADL): ICD-10-CM

## 2024-05-13 DIAGNOSIS — M46.1 SACROILIITIS, NOT ELSEWHERE CLASSIFIED: Primary | ICD-10-CM

## 2024-05-13 DIAGNOSIS — R53.1 DECREASED STRENGTH, ENDURANCE, AND MOBILITY: ICD-10-CM

## 2024-05-13 DIAGNOSIS — I69.30 LATE EFFECT OF CEREBROVASCULAR ACCIDENT (CVA): Primary | ICD-10-CM

## 2024-05-13 DIAGNOSIS — R29.898 DECREASED GRIP STRENGTH OF LEFT HAND: ICD-10-CM

## 2024-05-13 DIAGNOSIS — R29.898 FINE MOTOR IMPAIRMENT: ICD-10-CM

## 2024-05-13 PROCEDURE — 97530 THERAPEUTIC ACTIVITIES: CPT | Mod: KX

## 2024-05-13 PROCEDURE — 97110 THERAPEUTIC EXERCISES: CPT

## 2024-05-13 PROCEDURE — 97112 NEUROMUSCULAR REEDUCATION: CPT | Mod: KX

## 2024-05-13 PROCEDURE — 97140 MANUAL THERAPY 1/> REGIONS: CPT

## 2024-05-13 PROCEDURE — 97110 THERAPEUTIC EXERCISES: CPT | Mod: KX

## 2024-05-13 NOTE — PROGRESS NOTES
[facility]  Occupational Therapy Progress and Treatment Note     Date: 5/13/2024  Name: Leslie CASTELLON [facility] Number: [id number]    Therapy Diagnosis:     Encounter Diagnoses   Name Primary?    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     Decreased  strength of left hand     Decreased strength, endurance, and mobility     Decreased activities of daily living (ADL)     Fine motor impairment        Physician: Marily Millan NP    Physician Orders: Occupational Therapy to Evaluate and Treat   Medical Diagnosis: R29.898 (ICD-10-CM) - Poor fine motor skills  Surgical Procedure and Date: N/A     Evaluation Date: 11/8/2023  Insurance Authorization Period Expiration: 11/8/2023 - 12/31/2023  Plan of Care Certification Period: 11/8/2023 - 02/08/2024  Updated Plan of Care Certification Period: 02/08/2024 - 05/21/2024  Progress Note Due: 4/21/2024     Date of Return to MD: N/A     Visit # / Visits authorized: 8/25 (14 Episode Visits)  FOTO: 1/3     Precautions:  Standard    Time In: 9:45  Time Out: 10:30  Total Treatment Time: 45 minutes  Total Billable Time: 45 minutes    Subjective     Patient reports: She has been having frequent UTI's which has impacted her function and ability to participate in therapy    she was compliant with home exercise program given last session.   Response to previous treatment: Tolerated well  Functional change: Improved knowledge of HEP    Pain: 4/10  Location: bilateral hands     Objective     Patient has had 2 UTI's since last visit and has not been seen for almost 30 days. Patient has not progressed due to medical complications. She is currently stable and will resume therapy     Joint Evaluation  AROM  2/21/2024 AROM  2/21/2024 PROM   2/21/2024 AROM  5/13/2024 Goal  Right     Left Right Right Right Active    Shoulder Flexion 0-180 Within normal limits  80 105 87 110    Shoulder Abduction 0-180  80 105 86 110   Shoulder External Rotation 0-90  25 40       Shoulder Internal Rotation 0-90  45 60       Shoulder Extension 0-60  10 25       Shoulder Horizontal Adduction 0-90  Within normal limits  Within normal limits        Elbow Flexion 0-150          Elbow Extension 0          Wrist Flexion 0-80          Wrist Extension 0-70          Wrist Supination 0-80          Wrist Pronation 0-80          Wrist Ulnar Deviation 0-30          Wrist Radial Deviation 0-20             Fist: normal        Strength 2/21/2024 2/21/2024 5/13/2024 5/13/2024     **within available ROM** Left Right Left Right Goal Left   Shoulder Flexion 3+/5 3-/5 3+/5 3-/5 4/5   Shoulder Abduction 3+/5 3-/5   4/5   Shoulder External Rotation  3+/5 3-/5   4/5   Shoulder Internal Rotation 3+/5 3-/5   4/5   Shoulder Extension 3+/5 3-/5   4/5   Shoulder Horizontal Adduction 3+/5 3-/5   4/5   Elbow Flexion 3+/5 3+/5 4-/5 4-/5 4/5   Elbow Extension 3+/5 3+/5   4/5   Wrist Flexion 3+/5 3+/5   4/5   Wrist Extension 3+/5 3+/5   4/5   Supination 3+/5 3+/5   4/5   Pronation 3+/5 3+/5   4/5   Ulnar Deviation 3+/5 3+/5   4/5   Radial Deviation 3+/5 3+/5   4/5       Strength: (MUNDO Dynamometer in lbs.) Average 3 trials, Position II:       2/21/2024 2/21/2024 4/1/2024 Goal     Left Right Left Left   Rung II 25# 30# 27#        Treatment     Supervised Modalities: Modalities such as hot/cold packs, traction, unattended electrical stimulation, paraffin bath, fluidotherapy to reduce pain and increase soft tissue extensibility. Leslie received (0) minutes of modalities listed below after being cleared for contraindications.  x = modalities performed     Supervised Modalities 5/13/2024                            Manual Therapy Techniques: Joint mobilizations, Soft tissue Mobilization, and mobilization with movement applied to the area listed below. Leslie received (0) minutes of interventions. x = intervention performed today    Manual  Intervention 5/13/2024    Soft Tissue Mobilization     Joint Mobilizations     Mobilization with movement              Therapeutic Exercises: to develop strength, endurance, ROM, flexibility, posture and core stabilization. Leslie received (15) minutes of exercises listed below. x = performed today * = level of progression of activity     Therapeutic Exercise 5/13/2024    Interossei  -green foam  -rubber bands x 20x bilateral    Digi flexion - red x 20x bilateral    Resistive pinch - red  -2pt  -3pt  -lateral x 20x bilateral    Thumb palmar   -adduction  -abduction x 20x bilateral    Thumb radial  -adduction  -abduction x 20x bilateral    Forearm exercise  - wrist flexion  -wrist extension  - supination  -pronation x 2lb        Therapeutic Activities: Everyday tasks to address the combined need of improved strength, range of motion, and endurance to achieve increased independence. While simulating these activities, the person is applying the gained skills of strength and improved range of motion in a practical way.  Leslie received (15) minutes of activities listed below. x = activities performed today * = level of progression of activity     Therapeutic Activities 5/13/2024    Velcro board  -lateral pinch  - dowel  x 20x bilateral    Opening and closing containers x 5 minutes, ergonomics               Neuromuscular Re-education: the use of functional strengthening, stretching, balancing and coordination activities that train the nerves and muscles to react and communicate to restore normal body movement patterns to increase self care independence. Leslie received (15) minutes of interventions listed below.  x = interventions performed today * = level of progression of activity     Neuromuscular Re-education 5/13/2024    Finger opposition  x 20x   Finger taps (extension) x 20x   Finger opposition with slides x    Table Top active assist range of motion  - shoulder flexion  - shoulder abduction  x 5x - 15 second holds      Self Care: Fundamental skills required to independently care for oneself. Activities of daily living such as eating, bathing, dressing, toileting, grooming, sleeping, transfers and mobility. Instrumental activities of daily living such as driving, medication management, pet care, home management/cleaning, financial management, using the phone, meal preparation and shopping. Leslie received (0) minutes of interventions listed below.  x = interventions performed * = level of progression of activity     Self Care 5/13/2024                            Home Exercises and Education Provided     Education provided:   - home exercise program education provided  - progress towards goals     Written Home Exercises Provided: Patient instructed to cont prior HEP.  Exercises were reviewed and Leslie was able to demonstrate them prior to the end of the session. Leslie demonstrated good understanding of the home exercise program provided.     See EMR under Patient Instructions for exercises provided prior visit.        Assessment     Patient tolerated exercises well. Needs verbal cues for hand placement.     Leslie is progressing towards her goals and there are no updates to goals at this time. Patient prognosis is Good.     Patient will continue to benefit from skilled outpatient occupational therapy to address the deficits listed in the problem list on initial evaluation provide patient/family education and to maximize patient's level of independence in the home and community environment.     Patient's spiritual, cultural and educational needs considered and patient agreeable to plan of care and goals.    Anticipated barriers to occupational therapy: Cognition, home exercise program compliance    Goals:     Short Term Goals:  6 weeks  Progress    Pain: Patient will demonstrate improved pain by reports of less than or equal to 7/10 worst pain on the verbal rating scale in order to progress toward maximal functional ability and improve  quality of life. PC   Function: Patient will demonstrate improved function as indicated by a functional limitation score of less than or equal to 46 out of 100 on FOTO. PC   Mobility: Patient will improve active range of motion to 50% of stated goals, listed in objective measures above, in order to progress towards independence with functional activities.  PC   Strength: Patient will improve strength to 50% of stated goals, listed in objective measures above, in order to progress towards independence with functional activities.  PC   HEP: Patient will demonstrate independence with home exercise program in order to progress toward functional independence. PC      Long Term Goals:  12 weeks  Progress   Pain: Patient will demonstrate improved pain by reports of less than or equal to 6/10 worst pain on the verbal rating scale in order to progress toward maximal functional ability and improve quality of life.   PC   Function: Patient will demonstrate improved function as indicated by a functional limitation score of less than or equal to 48 out of 100 on FOTO. PC   Mobility: Patient will improve active range of motion to stated goals, listed in objective measures above, in order to return to maximal functional potential and improve quality of life. PC   Strength: Patient will improve strength to stated goals, listed in objective measures above, in order to improve functional independence and quality of life. PC   Patient will return to normal ADL's, IADL's, community involvement, recreational activities, and work-related activities with less than or equal to 5/10 pain and maximal function.  PC      Goals Key:  PC= Progressing/Continue;       PM= Partially Met;       GM= Goal Met,      DC= Discontinue    Plan     Continue Plan of Care (POC) and progress per patient tolerance.      Ivana Gallo, OT  ,OTD, OTR/L

## 2024-05-13 NOTE — PLAN OF CARE
[facility]  Speech Therapy Updated Plan of Care-Neurological Rehabilitation         Date: 5/9/2024   Name: Leslie CASTELLON [facility] Number: [id number]    Therapy Diagnosis:   Encounter Diagnosis   Name Primary?    Cognitive communication disorder Yes     Physician: Marily Millan NP    Physician Orders: Ambulatory Referral to Speech Therapy   Medical Diagnosis: Disorientation [R41.0], Aphasia [R47.01]     Visit #/ Visits Authorized:  13/20   Evaluation Date: 11/8/2023  Insurance Authorization Period: 1/2/2024 - 12/31/2024  Plan of Care Expiration:    5/1/2024  New POC Certification Period:  5/1/2024 - 6/26/2024    Total Visits Received: 13    Precautions:Standard, Fall, and Cognition  Subjective     Update: Patient has not been to therapy since 4/15/2024. She wishes to resume therapy at this time. Patient and family have concerns regarding word finding and cognitive-communication function that appears to be declining. They were advised to follow up with Neurology, establish with case management and establish with behavioral health.     Objective     Given multiple sites of lesion, is it difficult to determine true language deficits from cognitive deficits. However, patient with word finding deficits at the conversational level and with confrontational naming task. Therefore, the WAB-R was given today to further assess language.      Western Aphasia Battery - Revised (WAB-R) was administered to evaluate the patient's receptive and expressive language function.The purpose stated in the manual for the WAB-R is to determine the presence, severity, and type of aphasia; measure the patient's level of performance to provide a baseline for detecting change over time; provide a comprehensive assessment of the patients language strengths and deficits in order to guide treatment and management; infer the location and etiology of the lesion causing the aphasia.  The following results were revealed:      DOMAIN  SCORE   Spontaneous Speech Score 13/20   Auditory Comprehension Score  8.6/10   Repetition Score  9.2/10   Naming and Word Finding Score 7.7/10   Aphasia Quotient (AQ) 77/100   Aphasia Classification  Moderate Anomic Aphasia      SUBTEST SCORE   Information Content 7/10   Fluency, Grammatical, Competence, and Paraphasias  6/10   Yes/No Questions 60/60   Auditory Word Recognition 57/10   Sequential Commands 55/80   Repetition 92/100   Object Naming 54/60   Word Fluency 5/20   Sentence Completion 10/10   Responsive Speech  8/10         The patient presents with a moderate anomic aphasia characterized by halting speech, difficulty with confrontational naming tasks, and deficits in auditory comprehension that are appreciated with increased complexity of task (yes/no questions versus sequential commands).         This patient has multiple sites of lesion from several cerebral vascular accidents and thus it is extremely difficulty to evaluate language abilities without taking into consideration the cognitive impact on these scores. The patient has a known right hemisphere cerebral vascular accident and a suspected left hemisphere cerebral vascular accident which was unable to be confirmed with imaging due to incompatibility with MRI (pacemaker).   However, the patient did have right sided weakness and aphasia during her hospital stay prompting the suspicion of a left hemisphere cerebral vascular accident. The patient endorses both word finding deficits and cognitive-communication deficits. It is likely that both a true language disorder and a cognitive-communication disorder are present although it is difficult to assess each independently.        Assessment     Update: Leslie CANDE Wood presents to [facility] status post medical diagnosis of disorientation and aphasia. Demonstrates impairments including limitations as described in the problem list. Positive prognostic factors include family support.  Negative prognostic factors include potential decline in cognitive-communication functions. She presents with a cognitive-communication disorder characterized by word finding deficits, attention deficits, short term memory deficits, and visuospatial deficits. No barriers to therapy identified.. Patient will benefit from skilled, outpatient rehabilitation speech therapy.    Rehab Potential: fair   Pt's spiritual, cultural, and educational needs considered and patient agreeable to plan of care and goals.    Education: Plan of Care     Previous Short Term Goals Status:    Short Term Goals: (6 weeks)   Patient will sustain attention to a simple task (auditory or visual) for 3 minutes with 90% accuracy or no more than 2 redirections.      Goal Met 12/13/2023   Auditory: met x2  Visual: met x7   2. Patient will complete selective attention tasks (auditory or visual) with 85% accuracy independently increase selective attention.      GOAL MET 3/18/2024   3. Patient will use attention shifting strategies to shift attention between two tasks (auditory or visual) with no more than 3 cues or 90% accuracy to improve alternating attention.         Auditory: GOAL MET 4/11/2024     Progressing/ Not Met 5/9/2024    4. Patient will recall functional information (name, birthday, address, etc) following delays of 2-3 minutes following a prompt questions across 3 therapy sessions with 80% accuracy (spaced retrieval).      Progressing/ Not Met 5/9/2024     5. Patient will complete a functional task to improve attention, memory and/or executive functions (I.e. sample bill paying activity, recipe, or multiple choice comprehension questions to 1 paragraphs) with 80% accuracy and natural environment noise distractions (TV news background, music, etc.).      Progressing/ Not Met 5/9/2024      6. Patient will be educated regarding cognitive deficits as applicable to the patient's life for increased awareness and will execute returned  demonstration of information with 80% accuracy.       Goal Met 1/24/2024    7. Patient will participate in continued cognitive assessment of right hemisphere dysfunction.      Goal Met 11/24/2023            New Short Term Goals:    Short Term Goals: (5 weeks)   3. Patient will use attention shifting strategies to shift attention between two tasks (auditory or visual) with no more than 3 cues or 90% accuracy to improve alternating attention.         Auditory: GOAL MET 4/11/2024     Progressing/ Not Met 5/9/2024    4. Patient will recall functional information (name, birthday, address, etc) following delays of 2-3 minutes following a prompt questions across 3 therapy sessions with 80% accuracy (spaced retrieval).      Progressing/ Not Met 5/9/2024     5. Patient will complete a functional task to improve attention, memory and/or executive functions (I.e. sample bill paying activity, recipe, or multiple choice comprehension questions to 1 paragraphs) with 80% accuracy and natural environment noise distractions (TV news background, music, etc.).      Progressing/ Not Met 5/9/2024      NEW GOAL: Patient will complete word finding tasks (VNEST, confrontational naming, picture description, SFA charts) with 90% accuracy given minimal cueing.      NEW GOAL: Patient will utilize word finding strategies during conversation with 90% accuracy given minimal cueing.             Long Term Goal Status:  12 weeks  1. Patient will improve attention skills to effectively attend to and communicate in simple daily living tasks in functional living environment.   2. Patient will improve expressive language skills in order to communicate more effectively in social/community interactions, with medical providers, and with family.     Goals Previously Met:  Short Term Goals: (6 weeks)   1. Patient will sustain attention to a simple task (auditory or visual) for 3 minutes with 90% accuracy or no more than 2 redirections.      Goal Met  12/13/2023   Auditory: met x2  Visual: met x7   2. Patient will complete selective attention tasks (auditory or visual) with 85% accuracy independently increase selective attention.      GOAL MET 3/18/2024   6. Patient will be educated regarding cognitive deficits as applicable to the patient's life for increased awareness and will execute returned demonstration of information with 80% accuracy.       Goal Met 1/24/2024    7. Patient will participate in continued cognitive assessment of right hemisphere dysfunction.      Goal Met 11/24/2023          Reasons for Recertification of Therapy: Patient continues to present with a cognitive-communication disorder that impacts her ability to independently and safety complete activities of daily living and communicate urgent medical information if needed.     Plan     Updated Certification Period: 5/9/2024 to 6/26/2024    Recommended Treatment Plan: Patient will participate in the [facility] rehabilitation program for speech therapy 1-2 times per week to address her Communication and Cognition deficits, to educate patient and their family, and to participate in a home exercise program.     Other recommendations:   Follow up with Neurology   Referral to Case Management   Referral to Behavioral Health      Therapist's Name:    Becca Blanco, L-SLP, CCC-SLP, CBIS   Speech Language Pathologist   Certified Brain Injury Specialist   5/13/2024      I CERTIFY THE NEED FOR THESE SERVICES FURNISHED UNDER THIS PLAN OF TREATMENT AND WHILE UNDER MY CARE      Physician Name: _______________________________    Physician Signature: ____________________________

## 2024-05-16 ENCOUNTER — CLINICAL SUPPORT (OUTPATIENT)
Dept: REHABILITATION | Facility: HOSPITAL | Age: 84
End: 2024-05-16
Payer: MEDICARE

## 2024-05-16 DIAGNOSIS — R41.841 COGNITIVE COMMUNICATION DISORDER: Primary | ICD-10-CM

## 2024-05-16 PROCEDURE — 92507 TX SP LANG VOICE COMM INDIV: CPT | Mod: KX

## 2024-05-16 NOTE — PROGRESS NOTES
[facility]   Physical Therapy Treatment Note        Name: Leslie CASTELLON [facility] Number: [id number]    Therapy Diagnosis:   Encounter Diagnoses   Name Primary?    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  However, she does still report of pain at the lower back region    He/She was partially compliant with home exercise program.  Response to previous treatment: some pain relief  Functional change: Some increased speed with walking    Pain: 6/10     Location: lumbar paraspinal region    Objective      Objective Measures updated at progress report or POC update only unless otherwise noted.       Palpation: Trigger points on palpation of the lumbar paraspinal musculature    Treatment     Leslie received the treatments listed below:     THERAPEUTIC EXERCISES to develop strength, endurance, ROM, flexibility, posture, and core stabilization for (30) minutes including:    Intervention Performed Today    Nustep    15 minutes, L3   Bicycle  x X 8m   Lower trunk rotations x 10 second holds x 10   Pelvic tilts  "X Anterior/posterior with cueing- 10 second holds x 2 min   Sitting flexion with swiss ball  10x   Isometric adduction with ball   3  min   Abdominal brace x With single knee to chest and slowly lowering - 2x10   Supine x Active straight leg raise - 2x10   Long Arc Quads  3# ankle weight - 3x15       Leslie participated in neuromuscular re-education activities to improve: Balance, Coordination, Proprioception, and Posture for 0 minutes. The following activities were included:      Intervention 12/12/2023  Parameters   Sit to stand from 20 inch mat with no upper extremities assist  10x/ 2 sets hands together   Prone hip extension  10x with cues   Sidelying hip series  2x10 abduction  10x/ 2 sets clamshell   Bridging  10 second holds x 2m   Step Ups  4 inch box   Standing with red theraband X  X  X Hip Extension - 2x10  Hip Abduction - 2x10  Marching - 2x10   Tandem stance  Hand Touches - 2x10   Matrix   Single leg Knee Extension - 2x10 - starting at 60-70 deg knee flexion -  no plates, 1 bowling pin  Single leg knee flexion - 2x10, 15#        Plan for Next Visit: Progress as indicated      Leslie participated in dynamic functional therapeutic activities to improve functional performance for 15  minutes, including:    Intervention 5/13/2024  Parameters   Walking with 6# ball throw  2 passes   Large forward steps with small lunge  2x10                                                     MANUAL THERAPY TECHNIQUES were applied for (30) minutes, including:    Manual Intervention Performed Today    Soft Tissue Mobilization x  STM bilateral multifidus   Joint Mobilizations x PA glides lumbosacral   Mobilization with movement     Muscle energy techniques   "Shotgun" for + SI provocation   Functional Dry Needling   FDN performed to the bilateral lumbosacral paraspinal/multifidus     Plan for Next Visit:         Patient Education and Home Exercises       Home Exercises Provided and Patient Education Provided     Education " provided: (5) minutes  Moist heat pack to low back prior to exercises at home.    Written Home Exercises Provided: yes.  Exercises were reviewed and Leslie was able to demonstrate them prior to the end of the session.  Leslie demonstrated fair  understanding of the education provided. See EMR under Patient Instructions for exercises provided during therapy sessions.    Assessment     Patient reports that right knee pain is significantly decreased.  However, she does report that she experiences significant lower back pain.  Therefore, performed STM and FDN with electrical stimulation to the lumbosacral PSM/multifidus.  Also worked on some strengthening and stretching activities.    Leslie is progressing well towards her goals.   Patient prognosis is Fair.     Patient will continue to benefit from skilled outpatient physical therapy to address the deficits listed in the problem list box on initial evaluation, provide pt/family education and to maximize patient's level of independence in the home and community environment.     Patient's spiritual, cultural and educational needs considered and pt agreeable to plan of care and goals.     Anticipated Barriers for therapy: Anxiety or Panic Disorders, Arthritis, Back pain, Depression, Headaches, High  Blood Pressure, Kidney, Bladder, Prostate or Urination Problems, Osteoporosis, Prior Surgery, Stroke or TIA, Visual Impairment      Goals: Reviewed:5/13/2024       Short Term Goals: In 4 weeks   1.Patient to be educated on HEP. - met  2. Patient  to increase lumbar spine ROM to B SB 30 deg, B Rot 75% - PC  3. Patient  to increase hip PROM to 60 deg ER bilaterally, IR 20 deg bilaterally, in order to assist with more normalized gait pattern. - met  4. Patient  to increase B LE and core strength to 4-/5 in order to improve gait and balance.  - PC  5. Patient to increased right knee extn ROM to -5 deg for improved gait mechanics- PC  6. Patient   to have pain less than 2/10 at worst, to  improve QOL. - PC  7. Patient  to improve score on the FOTO, to improve QOL.- PC  8. Patient  to be educated on postural/body mechanics awareness. - PC     Long Term Goals: In 8 weeks  1.Patient to improve score on the FOTO to 48 or better, to improve QOL. - PC  2. Patient to demo increase in LE strength to 4/5, in order to improve endurance and increase ability to ambulate for increased time. - PC  3. Patient to have decreased pain to 2/10 at worst, to improve QOL. - met  4. Patient to demo increase lumbar ROM to ,  SB 30 deg, B Rot 90%in order to improve available range of motion for ADL's. - PC  .  5. Patient  to increase hip PROM to Extn +5 deg,  60 deg ER bilaterally, IR 20 deg bilaterally, in order to assist with more normalized gait pattern. - PC  6. Patient to increased right knee extn ROM to -5 deg for improved gait mechanics - PC  6. Patient to perform daily activities without increased symptoms including:  Prolonged walking x 10 minutes. - met  Ascending.descending 6 inch step without upper extremities assistance - PC  Return to gym and work outings 2 times per week with family transportation - met         Plan     Would like to extend treatment for 2 more months to allow the patient to return to an exercise regimen as she has had limited exercises over the last few weeks with the recent death of the patient's daughter.      Leoncio Lujan, PT

## 2024-05-17 ENCOUNTER — OFFICE VISIT (OUTPATIENT)
Dept: PRIMARY CARE CLINIC | Facility: CLINIC | Age: 84
End: 2024-05-17
Payer: MEDICARE

## 2024-05-17 ENCOUNTER — PATIENT MESSAGE (OUTPATIENT)
Dept: PRIMARY CARE CLINIC | Facility: CLINIC | Age: 84
End: 2024-05-17

## 2024-05-17 VITALS
WEIGHT: 132.19 LBS | TEMPERATURE: 98 F | OXYGEN SATURATION: 96 % | SYSTOLIC BLOOD PRESSURE: 110 MMHG | HEIGHT: 61 IN | DIASTOLIC BLOOD PRESSURE: 66 MMHG | HEART RATE: 77 BPM | BODY MASS INDEX: 24.96 KG/M2

## 2024-05-17 DIAGNOSIS — I25.10 CORONARY ARTERY DISEASE INVOLVING NATIVE CORONARY ARTERY OF NATIVE HEART WITHOUT ANGINA PECTORIS: ICD-10-CM

## 2024-05-17 DIAGNOSIS — F41.9 ANXIETY AND DEPRESSION: ICD-10-CM

## 2024-05-17 DIAGNOSIS — E11.59 HYPERTENSION ASSOCIATED WITH DIABETES: ICD-10-CM

## 2024-05-17 DIAGNOSIS — I15.2 HYPERTENSION ASSOCIATED WITH DIABETES: ICD-10-CM

## 2024-05-17 DIAGNOSIS — I69.30 LATE EFFECT OF CEREBROVASCULAR ACCIDENT (CVA): ICD-10-CM

## 2024-05-17 DIAGNOSIS — I69.354 HEMIPLEGIA AND HEMIPARESIS FOLLOWING CEREBRAL INFARCTION AFFECTING LEFT NON-DOMINANT SIDE: ICD-10-CM

## 2024-05-17 DIAGNOSIS — R47.01 APHASIA: ICD-10-CM

## 2024-05-17 DIAGNOSIS — G45.9 TIA (TRANSIENT ISCHEMIC ATTACK): ICD-10-CM

## 2024-05-17 DIAGNOSIS — R41.89 COGNITIVE DECLINE: ICD-10-CM

## 2024-05-17 DIAGNOSIS — Z76.89 ENCOUNTER TO ESTABLISH CARE WITH NEW DOCTOR: Primary | ICD-10-CM

## 2024-05-17 DIAGNOSIS — F32.A ANXIETY AND DEPRESSION: ICD-10-CM

## 2024-05-17 DIAGNOSIS — Z78.9 IMPAIRED MOBILITY AND ADLS: ICD-10-CM

## 2024-05-17 DIAGNOSIS — Z74.09 IMPAIRED MOBILITY AND ADLS: ICD-10-CM

## 2024-05-17 DIAGNOSIS — Q24.5 CORONARY-MYOCARDIAL BRIDGE: ICD-10-CM

## 2024-05-17 DIAGNOSIS — F33.1 MODERATE EPISODE OF RECURRENT MAJOR DEPRESSIVE DISORDER: ICD-10-CM

## 2024-05-17 DIAGNOSIS — I70.0 ATHEROSCLEROSIS OF AORTA: ICD-10-CM

## 2024-05-17 PROCEDURE — 99999 PR PBB SHADOW E&M-EST. PATIENT-LVL V: CPT | Mod: PBBFAC,,, | Performed by: FAMILY MEDICINE

## 2024-05-17 PROCEDURE — 99215 OFFICE O/P EST HI 40 MIN: CPT | Mod: PBBFAC,PN | Performed by: FAMILY MEDICINE

## 2024-05-17 PROCEDURE — G2211 COMPLEX E/M VISIT ADD ON: HCPCS | Mod: S$PBB,,, | Performed by: FAMILY MEDICINE

## 2024-05-17 PROCEDURE — 99215 OFFICE O/P EST HI 40 MIN: CPT | Mod: S$PBB,,, | Performed by: FAMILY MEDICINE

## 2024-05-17 NOTE — PROGRESS NOTES
Leslie CASTELLON Israel  05/17/2024  [id number]    Mckayla Eisenberg MD  Patient Care Team:  Mckayla Eisenberg MD as PCP - General (Family Medicine)  Elva Israel MD as Consulting Physician (Pain Medicine)  Daniel Bonilla MD (Ophthalmology)  Viki Ruiz PA-C as Physician Assistant (Internal Medicine)  Urvashi Valle as ED Navigator  Program, Medicare Shared Savings as Hypertension Digital Medicine Contract  Mckayla Eisenberg MD as Hypertension Digital Medicine Responsible Provider (Family Medicine)  Norma Graff, Clarice as Hypertension Digital Medicine Clinician (Pharmacist)    Future Appointments   Date Time Provider Department Center   5/20/2024  9:45 AM Ivana Gallo, OT [facility] [place]   5/20/2024 10:30 AM Becca Blanco CCC-SLP [facility] [place]   5/23/2024 10:00 AM Leoncio Lujan, PT [facility] [place]   5/27/2024  9:00 AM Ivana Gallo, OT [facility] [place]   5/27/2024  9:45 AM Becca Blanco CCC-SLP [facility] [place]   5/30/2024  9:45 AM Becca Blanco, CCC-SLP [facility] [place]   5/30/2024 10:45 AM Leoncio Lujan, PT [facility] [place]   6/3/2024  9:45 AM Becca Blanco CCC-SLP [facility] [place]   6/3/2024 10:45 AM Leoncio Lujan, PT [facility] [place]   6/5/2024  9:00 AM Raghu Felix MD [facility] [place]   6/6/2024  9:45 AM Becca Blanco, CCC-SLP [facility] [place]   7/17/2024 11:00 AM Natalia Gomez MD [facility]   7/25/2024 10:00 AM Luis Ibarra MD [facility]   9/3/2024  9:40 AM Domingo Martins MD [facility]       [facility]      Chief Complaint:  Chief Complaint   Patient presents with    Establish Care    Depression    Extremity Weakness    Fatigue           History of Present Illness:  New pt here with granddaughter and caretaker Ariane. Concerned about depression, fatigue and cognition decline/forgetfulness since stroke 2 years ago,  really intensified past few mo after Daughter (Ariane's mom)  in February. Sx includes anhedonia, irritability, forgetfulness, fatigue, sense of unwell. Her PT suggested neurology evaluation. Ariane also asks for community resources to help pt eventually to move back to her own home, next door to Ariane    Active ongoing problems reviewed  Fall risks reviewed - high  IADL/ADLs reviewed - independent ADL, not IADL. Lives with Ariane's family  Diet and exercises reviewed - reg diet, in PT to regain mobility since cva  ACP - HCPOA signed today    Advance Care Planning     Date: 2024    Power of   I initiated the process of voluntary advance care planning today and explained the importance of this process to the patient.  I introduced the concept of advance directives to the patient, as well. Then the patient received detailed information about the importance of designating a Health Care Power of  (HCPOA). She was also instructed to communicate with this person about their wishes for future healthcare, should she become sick and lose decision-making capacity. The patient has not previously appointed a HCPOA. After our discussion, the patient has decided to complete a HCPOA and has appointed her granddaughter, health care agent: Ariane [employer] & health care agent number: [phone] I spent a total time of 5' minutes discussing this issue with the patient.      Review of patient's allergies indicates:   Allergen Reactions    Amitriptyline     Hydrochlorothiazide      Causes muscle cramping    Lisinopril      hyperkalemia    Opioids - morphine analogues Hallucinations     Chest pain and hallucinations    Oxycodone     Percocet [oxycodone-acetaminophen] Other (See Comments)     Seizures    Belbuca [buprenorphine hcl] Nausea And Vomiting and Other (See Comments)     Black out     Codeine Nausea Only and Rash    Prazosin Other (See Comments)     dizziness     Current Outpatient Medications on File Prior to  Visit   Medication Sig Dispense Refill    aspirin 81 MG Chew Take 81 mg by mouth once daily.      atorvastatin (LIPITOR) 10 MG tablet Take 1 tablet (10 mg total) by mouth every evening. 90 tablet 3    DULoxetine (CYMBALTA) 30 MG capsule Take 30 mg by mouth once daily.      furosemide (LASIX) 20 MG tablet TAKE 1 TABLET BY MOUTH ONCE A DAY 30 tablet 11    losartan (COZAAR) 50 MG tablet TAKE 1 TABLET BY MOUTH TWICE DAILY 180 tablet 1    metoprolol succinate (TOPROL-XL) 25 MG 24 hr tablet Take 1 tablet (25 mg total) by mouth once daily. 90 tablet 3    mupirocin (BACTROBAN) 2 % ointment Apply topically 2 (two) times daily. 15 g 0    NUCYNTA 50 mg Tab Take 1 tablet (50 mg total) by mouth every 8 (eight) hours as needed (severe pain). RESTART WHEN OK PER PAIN MANAGEMENT PROVIDER 1 tablet 0    polyethylene glycol (GLYCOLAX) 17 gram PwPk Take 17 g by mouth once daily. (Patient not taking: Reported on 5/17/2024) 30 each 0    tiZANidine (ZANAFLEX) 4 MG tablet  (Patient not taking: Reported on 5/17/2024)      [DISCONTINUED] carvediloL (COREG) 6.25 MG tablet Take 1 tablet (6.25 mg total) by mouth 2 (two) times daily. TAKE (1) TABLET BY MOUTH 2 TIMES A DAY WITH MEALS 180 tablet 3    [DISCONTINUED] cloNIDine (CATAPRES) 0.1 MG tablet Take 1 tablet (0.1 mg total) by mouth 2 (two) times daily. To take an extra dose if BP >160/90 90 tablet 11    [DISCONTINUED] diclofenac sodium (VOLTAREN) 1 % Gel Apply 2 g topically 3 (three) times daily. 1 Tube 2    [DISCONTINUED] isosorbide mononitrate (IMDUR) 30 MG 24 hr tablet TAKE 1 TABLET BY MOUTH TWICE A DAY 60 tablet 3    [DISCONTINUED] metoprolol tartrate (LOPRESSOR) 25 MG tablet Take 1 tablet (25 mg total) by mouth 3 (three) times daily. 90 tablet 0    [DISCONTINUED] nitroGLYCERIN (NITROSTAT) 0.4 MG SL tablet Place 1 tablet (0.4 mg total) under the tongue every 5 (five) minutes as needed for Chest pain. 7 tablet 0    [DISCONTINUED] valsartan (DIOVAN) 160 MG tablet TAKE 1 TABLET BY MOUTH  TWICE A  tablet 0     No current facility-administered medications on file prior to visit.       Review of Systems   Respiratory:  Negative for shortness of breath.    Cardiovascular:  Negative for chest pain and leg swelling.   Psychiatric/Behavioral:  Negative for suicidal ideas.      PHQ-4 Score: 6     Exam:    Physical Exam  Vitals and nursing note reviewed.   Constitutional:       General: She is not in acute distress.     Appearance: She is not toxic-appearing.      Comments: pale   HENT:      Head: Normocephalic and atraumatic.   Cardiovascular:      Rate and Rhythm: Normal rate and regular rhythm.   Pulmonary:      Effort: Pulmonary effort is normal.      Breath sounds: Normal breath sounds.   Musculoskeletal:      Right lower leg: No edema.      Left lower leg: No edema.   Skin:     Capillary Refill: Capillary refill takes less than 2 seconds.   Neurological:      Mental Status: She is alert and oriented to person, place, and time.      Motor: Weakness (RLE 4-/5) present.      Gait: Gait abnormal (slow, uses walker most of the time).   Psychiatric:         Mood and Affect: Mood normal.         Thought Content: Thought content normal.         Vitals:    05/17/24 0929   BP: 110/66   Pulse: 77   Temp: 97.8 °F (36.6 °C)     Weight: 59.9 kg (132 lb 2.7 oz)  BP Readings from Last 3 Encounters:   05/17/24 110/66   04/24/24 126/67   04/23/24 110/70     Wt Readings from Last 3 Encounters:   05/17/24 0929 59.9 kg (132 lb 2.7 oz)   04/25/24 0953 60.7 kg (133 lb 13.1 oz)   04/24/24 0922 60.7 kg (133 lb 13.1 oz)        Laboratory Reviewed:       Lab Results   Component Value Date    WBC 6.03 04/16/2024    HGB 13.4 04/16/2024    HCT 41.9 04/16/2024     04/16/2024    CHOL 128 04/03/2024    TRIG 78 04/03/2024    HDL 46 04/03/2024    ALT 20 04/16/2024    AST 23 04/16/2024     04/16/2024    K 4.6 04/16/2024     04/16/2024    CREATININE 0.8 04/16/2024    BUN 16 04/16/2024    CO2 27 04/16/2024    TSH  1.388 11/08/2023    INR 1.0 02/02/2023    HGBA1C 5.6 11/08/2023       Screening or Prevention Patient's value Goal Complete/Controlled?   HgA1C Testing and Control   Lab Results   Component Value Date    HGBA1C 5.6 11/08/2023      Annually/Less than 8% Yes   Lipid profile : 04/03/2024 Annually Yes   LDL control Lab Results   Component Value Date    LDLCALC 66.4 04/03/2024    Annually/Less than 100 mg/dl  Yes   Nephropathy screening Lab Results   Component Value Date    LABMICR 11.0 03/02/2023     Lab Results   Component Value Date    PROTEINUA Negative 04/23/2024    Annually Yes   Blood pressure BP Readings from Last 1 Encounters:   05/17/24 110/66    Less than 140/90 Yes   Dilated retinal exam : 04/20/2023 Annually Yes   Foot exam   Most Recent Foot Exam Date: Not Found Annually Yes       Health Maintenance  Health Maintenance Topics with due status: Not Due       Topic Last Completion Date    TETANUS VACCINE 06/24/2019    Lipid Panel 04/03/2024     Health Maintenance Due   Topic Date Due    Shingles Vaccine (1 of 2) Never done    RSV Vaccine (Age 60+ and Pregnant patients) (1 - 1-dose 60+ series) Never done    COVID-19 Vaccine (7 - 2023-24 season) 09/01/2023    Diabetes Urine Screening  03/02/2024    DEXA Scan  03/04/2024    Eye Exam  04/20/2024    Hemoglobin A1c  05/08/2024       Assessment/Plan:  1. Encounter to establish care with new doctor    2. Cognitive decline  -     Ambulatory referral/consult to Neurology; Future; Expected date: 05/24/2024    3. Moderate episode of recurrent major depressive disorder  -     Ambulatory referral/consult to Outpatient Case Management    4. TIA (transient ischemic attack)    5. Late effect of cerebrovascular accident (CVA)  Overview:  Cva at age 80 and 82, RLE weak, LUE weak resolved. expressive aphasia, in PT/OT/speech      6. Hemiplegia and hemiparesis following cerebral infarction affecting left non-dominant side    7. Aphasia    8. Anxiety and depression    9.  Coronary-myocardial bridge    10. Impaired mobility and ADLs  -     Ambulatory referral/consult to [facility] Based Primary Care Behavioral Health; Future; Expected date: 05/24/2024    11. Atherosclerosis of aorta  Overview:  cxr 11/20      12. Hypertension associated with diabetes    13. Coronary artery disease involving native coronary artery of native heart without angina pectoris  Overview:  S/p STEMI, coronary-myocardial bridge    Assessment & Plan:  No angina. Cardiology following            After visit summary printed and given to patient upon discharge. Health maintenance needs, recent test results, current management plans and goals of care discussed with patient/family. All questions answered. Pt and family are encouraged to be part of care team at all times      The following issues were discussed: The primary encounter diagnosis was Encounter to establish care with new doctor. Diagnoses of Cognitive decline, Moderate episode of recurrent major depressive disorder, TIA (transient ischemic attack), Late effect of cerebrovascular accident (CVA), Hemiplegia and hemiparesis following cerebral infarction affecting left non-dominant side, Aphasia, Anxiety and depression, Coronary-myocardial bridge, Impaired mobility and ADLs, Atherosclerosis of aorta, Hypertension associated with diabetes, and Coronary artery disease involving native coronary artery of native heart without angina pectoris were also pertinent to this visit.       Natalia Gomez MD  [facility] [address] [place] [address]

## 2024-05-17 NOTE — PATIENT INSTRUCTIONS
Ref to neurology  Ref to behavioral health  Refer to outpatient case management    If you are feeling unwell, we'd like to be the first ones to know [facility]! Please give us a call. Same day appointments are our top priority to keep you well and out of the emergency rooms and hospitals. Call [phone] for our direct line. After hours advice is always available. Please call [phone] after hours to speak to the on-call team.

## 2024-05-20 ENCOUNTER — TELEPHONE (OUTPATIENT)
Dept: PRIMARY CARE CLINIC | Facility: CLINIC | Age: 84
End: 2024-05-20
Payer: MEDICARE

## 2024-05-20 ENCOUNTER — CLINICAL SUPPORT (OUTPATIENT)
Dept: REHABILITATION | Facility: HOSPITAL | Age: 84
End: 2024-05-20
Payer: MEDICARE

## 2024-05-20 DIAGNOSIS — Z74.09 DECREASED STRENGTH, ENDURANCE, AND MOBILITY: ICD-10-CM

## 2024-05-20 DIAGNOSIS — I69.30 LATE EFFECT OF CEREBROVASCULAR ACCIDENT (CVA): Primary | ICD-10-CM

## 2024-05-20 DIAGNOSIS — R29.818 FINE MOTOR IMPAIRMENT: ICD-10-CM

## 2024-05-20 DIAGNOSIS — R29.898 DECREASED GRIP STRENGTH OF LEFT HAND: ICD-10-CM

## 2024-05-20 DIAGNOSIS — R68.89 DECREASED STRENGTH, ENDURANCE, AND MOBILITY: ICD-10-CM

## 2024-05-20 DIAGNOSIS — R29.898 FINE MOTOR IMPAIRMENT: ICD-10-CM

## 2024-05-20 DIAGNOSIS — Z78.9 DECREASED ACTIVITIES OF DAILY LIVING (ADL): ICD-10-CM

## 2024-05-20 DIAGNOSIS — I69.354 HEMIPLEGIA AND HEMIPARESIS FOLLOWING CEREBRAL INFARCTION AFFECTING LEFT NON-DOMINANT SIDE: ICD-10-CM

## 2024-05-20 DIAGNOSIS — R41.841 COGNITIVE COMMUNICATION DISORDER: Primary | ICD-10-CM

## 2024-05-20 DIAGNOSIS — R53.1 DECREASED STRENGTH, ENDURANCE, AND MOBILITY: ICD-10-CM

## 2024-05-20 PROCEDURE — 97112 NEUROMUSCULAR REEDUCATION: CPT | Mod: KX

## 2024-05-20 PROCEDURE — 97130 THER IVNTJ EA ADDL 15 MIN: CPT | Mod: KX

## 2024-05-20 PROCEDURE — 97110 THERAPEUTIC EXERCISES: CPT | Mod: KX

## 2024-05-20 PROCEDURE — 97530 THERAPEUTIC ACTIVITIES: CPT | Mod: KX

## 2024-05-20 PROCEDURE — 97129 THER IVNTJ 1ST 15 MIN: CPT | Mod: KX

## 2024-05-20 NOTE — PROGRESS NOTES
[facility]  Speech Therapy Treatment Note- Neurological Rehabilitation  Date: 5/16/2024     Name: Leslie Wood   MRN: [id number]   Therapy Diagnosis:   Encounter Diagnosis   Name Primary?    Cognitive communication disorder Yes     Physician: Marily Millan NP  Physician Orders: Ambulatory Referral to Speech Therapy   Medical Diagnosis: Disorientation [R41.0], Aphasia [R47.01]     Visit #/ Visits Authorized: 13/20 (+eval, +5)  Date of Evaluation:  11/8/23  Insurance Authorization Period: 11/15/2023 - 12/31/2023   Plan of Care Expiration Date:   6/26/2024  Extended Plan of Care:  N/A  Progress Note: 6/17/2024    Time In:  11:15 AM  Time Out:  12:00 PM  Total Billable Time: 45 mins      Precautions: Standard, Fall, Cognition, and Communication  Subjective:   Patient reports: she will be establishing care with the [facility]. They are interested in following up with Neurology regarding declining cognitive function as well as case management for assistance with legal, financial, emotional, and living situations.   She was compliant to home exercise program.     Response to previous treatment: good    Pain Scale: 8/10 on a Visual Analog Scale currently.  Pain Location: lower back  Objective:   TIMED  Procedure Min.   Cognitive Therapeutic Interventions, first 15 minutes CPT 66923     Cognitive Therapeutic Interventions, each additional 15 minutes CPT 62865       UNTIMED   Procedure Min.   Speech Language Therapy CPT 29439  45         Short Term Goals: (6 weeks) Current Progress:   Patient will sustain attention to a simple task (auditory or visual) for 3 minutes with 90% accuracy or no more than 2 redirections.      Goal Met 12/13/2023   Auditory: met x2  Visual: met x7 Auditory Sustained Attention:  Several sustained auditory attention tasks completed in today's session. Patient listened to multi-step commands and followed with 91% accuracy. She required initial verbal cueing for use of  strategies including taking requesting repetition as needed. Improved use of strategies independently with overall improved accuracy in today's session.    Visual Sustained Attention:  Patient completed several visual sustained attention tasks in today's session in which she visually scanned for letters/numbers in a paragraph.   Trial 1: 1:30, 100% accuracy, 1/7 relative scale of cognitive load   Trial 2: 1:45, 100% accuracy, 1/7 relative scale of cognitive load   Trial 3: 1:30, 100% accuracy, 1/7 relative scale of cognitive load    Trial 4: 2:08, -7 (missed entire row of scanning)   Trial 5: 2:10, 100% accuracy, 1/7 relative scale of cognitive load   Trial 6: 1:30, 100% accuracy, 1/7 relative scale of cognitive load     Overall improved use of strategies to facilitate visual scanning pattern as well as good awareness of errors. Overall high accuracy of completion with low cognitive load.       2. Patient will complete selective attention tasks (auditory or visual) with 85% accuracy independently increase selective attention.                                                GOAL MET 3/18/2024 Auditory Selective Attention:  Patient listened for and followed complex 2 step commands in a dynamic environment with auditory and visual distractions during completion. She followed commands with 92% accuracy and initial verbal cueing for use of strategies. Discussion of how this relates to conversation and asking clarifying questions to facilitate attention and information processing. She rated task as 2/7 on relative scale of cognitive load.    She then listened to a 5 minute video about how basketballs are made. She requested to take notes as needed and paused/rewound video as needed. She answered questions about what she heard with 92% accuracy and rated task as 2/7 on relative scale of cognitive load.     Visual Selective Attention:  Patient participated in visual selective attention task of N-Back (level 1) pictures with  natural background noise (therapy door remained opened.   Trial 1: Patient completed task with 86% accuracy given minimal cueing from clinician  Trial 2: Patient completed task with 88% accuracy independently.     GOAL MET 3/18/2024   3. Patient will use attention shifting strategies to shift attention between two tasks (auditory or visual) with no more than 3 cues or 90% accuracy to improve alternating attention.              Auditory: GOAL MET 4/11/2024    Progressing/ Not Met 5/16/2024  Visual Alternating Attention:   Not formally addressed.    4. Patient will recall functional information (name, birthday, address, etc) following delays of 2-3 minutes following a prompt questions across 3 therapy sessions with 80% accuracy (spaced retrieval).      Progressing/ Not Met 5/16/2024   Not formally addressed     5. Patient will complete a functional task to improve attention, memory and/or executive functions (I.e. sample bill paying activity, recipe, or multiple choice comprehension questions to 1 paragraphs) with 80% accuracy and natural environment noise distractions (TV news background, music, etc.).      Progressing/ Not Met 5/16/2024    Not formally addressed     6. Patient will be educated regarding cognitive deficits as applicable to the patient's life for increased awareness and will execute returned demonstration of information with 80% accuracy.       Goal Met 1/24/2024  Patient and her granddaughter again educated regarding nature of cognitive deficits including a variety of foundational cognitive elements related to R hemisphere dysfunction following CVA including the cognitive triangle (with emphasis on foundational versus higher level cognitive functions, awareness of deficits, impact of attention, information processing, memory, and executive functioning deficits as each area relates to assessment findings), spoon theory to discuss cognitive versus physical versus emotional fatigue and management of  each to more efficiently use energy daily for more or less cognitively demanding tasks, internal versus external distractions (including emotions, pain, stressors, etc) and management of each, as well as overall impact of these deficits on daily functioning. Patient demonstrated awareness of information provided as demonstrated by asking good questions and expressing understanding. Discussed specifically use of mindfulness and meditation/breathing strategies to improve cognitive functioning related to internal, specifically emotional stressors.       7. Patient will participate in continued cognitive assessment of right hemisphere dysfunction.     Goal Met 11/24/2023         GOAL MET 11/24/2023   8. NEW GOAL: Patient will complete word finding tasks (VNEST, confrontational naming, picture description, SFA charts) with 90% accuracy given minimal cueing     Progressing/Not Met 5/20/2024  Discussed SFA today. Patient able to complete SFA charts with 80% accuracy given moderate cueing from clinician.   She was provided SFA handouts to complete as part of her HEP.    9. NEW GOAL: Patient will utilize word finding strategies during conversation with 90% accuracy given minimal cueing.     Progressing/Not Met 5/20/2024           Patient Education/Response:   Patient educated regarding the following:  Majority of session spent on education today. Patient and her granddaughter participated in education regarding results of most recent assessment including the inability to determine cognitive deficits from true language deficits. They are agreeable to follow up with Neurology to address decline in cognitive functioning and complete a consultation with case management/social work in order to address some emotional and family dynamic concerns.     Home program established: Patient instructed to continue prior program  Patient verbalized understanding to all above education provided.     See Electronic Medical Record under  Patient Instructions for exercises provided throughout therapy.  Assessment:   Patient is progressing steadily towards goals. See updated POC.     Leslie is progressing well towards her goals. Current goals remain appropriate. Goals to be updated as necessary.     Patient prognosis is Fair. Patient will continue to benefit from skilled outpatient speech and language therapy to address the deficits listed in the problem list on initial evaluation, provide patient/family education and to maximize patient's level of independence in the home and community environment.     Medical necessity is demonstrated by the following IMPAIRMENTS:  Cognition: Deficits in executive functioning, attention, and memory prevent the pt from relaying medically and safety relevant information in a timely manner in a state of emergency.     Barriers to Therapy: NA  Patient's spiritual, cultural and educational needs considered and patient agreeable to plan of care and goals.  Plan:   Continue Plan of Care with focus on rehabilitation and compensation for language, attention, memory, and executive functioning deficits impacting safety and efficiency of daily task completion.     Becca Blanco, CCC-SLP, CBIS   Speech Language Pathologist   Certified Brain Injury Specialist   5/20/2024

## 2024-05-20 NOTE — PROGRESS NOTES
[facility]  Occupational Therapy Progress and Treatment Note     Date: 5/20/2024  Name: Leslie CASTELLON [facility] Number: [id number]    Therapy Diagnosis:     Encounter Diagnoses   Name Primary?    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     Decreased  strength of left hand     Decreased strength, endurance, and mobility     Decreased activities of daily living (ADL)     Fine motor impairment        Physician: Marily Millan NP    Physician Orders: Occupational Therapy to Evaluate and Treat   Medical Diagnosis: R29.898 (ICD-10-CM) - Poor fine motor skills  Surgical Procedure and Date: N/A     Evaluation Date: 11/8/2023  Insurance Authorization Period Expiration: 11/8/2023 - 12/31/2023  Plan of Care Certification Period: 11/8/2023 - 02/08/2024  Updated Plan of Care Certification Period: 02/08/2024 - 05/21/2024  Progress Note Due: 5/21/2024     Date of Return to MD: N/A     Visit # / Visits authorized: 9/25 (15 Episode Visits)  FOTO: 1/3     Precautions:  Standard    Time In: 9:45  Time Out: 10:30  Total Treatment Time: 45 minutes  Total Billable Time: 45 minutes    Subjective     Patient reports: She is doing well. She is currently stable    she was compliant with home exercise program given last session.   Response to previous treatment: Tolerated well  Functional change: Improved knowledge of HEP    Pain: 4/10  Location: bilateral hands     Objective     Please see progress noted for updated objective measures    Treatment     Supervised Modalities: Modalities such as hot/cold packs, traction, unattended electrical stimulation, paraffin bath, fluidotherapy to reduce pain and increase soft tissue extensibility. Leslie received (0) minutes of modalities listed below after being cleared for contraindications.  x = modalities performed     Supervised Modalities 5/20/2024                             Manual Therapy Techniques: Joint mobilizations, Soft tissue Mobilization, and mobilization with movement applied to the area listed below. Leslie received (0) minutes of interventions. x = intervention performed today    Manual Intervention 5/20/2024    Soft Tissue Mobilization     Joint Mobilizations     Mobilization with movement              Therapeutic Exercises: to develop strength, endurance, ROM, flexibility, posture and core stabilization. Leslie received (15) minutes of exercises listed below. x = performed today * = level of progression of activity     Therapeutic Exercise 5/20/2024    Interossei  -green foam  -rubber bands x 20x bilateral    Digi flexion - red x 20x bilateral    Resistive pinch - red  -2pt  -3pt  -lateral x 20x bilateral    Thumb palmar   -adduction  -abduction x 20x bilateral    Thumb radial  -adduction  -abduction x 20x bilateral    Forearm exercise  - wrist flexion  -wrist extension  - supination  -pronation x 2lb        Therapeutic Activities: Everyday tasks to address the combined need of improved strength, range of motion, and endurance to achieve increased independence. While simulating these activities, the person is applying the gained skills of strength and improved range of motion in a practical way.  Leslie received (15) minutes of activities listed below. x = activities performed today * = level of progression of activity     Therapeutic Activities 5/20/2024    Velcro board  -lateral pinch  - dowel  x 20x bilateral    Opening and closing containers x 5 minutes, ergonomics               Neuromuscular Re-education: the use of functional strengthening, stretching, balancing and coordination activities that train the nerves and muscles to react and communicate to restore normal body movement patterns to increase self care independence. Leslie received (15) minutes of interventions listed below.  x = interventions performed today * = level of progression of activity      Neuromuscular Re-education 5/20/2024    Finger opposition  x 20x   Finger taps (extension) x 20x   Finger opposition with slides x    Table Top active assist range of motion  - shoulder flexion  - shoulder abduction  x 5x - 15 second holds     Self Care: Fundamental skills required to independently care for oneself. Activities of daily living such as eating, bathing, dressing, toileting, grooming, sleeping, transfers and mobility. Instrumental activities of daily living such as driving, medication management, pet care, home management/cleaning, financial management, using the phone, meal preparation and shopping. Leslie received (0) minutes of interventions listed below.  x = interventions performed * = level of progression of activity     Self Care 5/20/2024                            Home Exercises and Education Provided     Education provided:   - home exercise program education provided  - progress towards goals     Written Home Exercises Provided: Patient instructed to cont prior HEP.  Exercises were reviewed and Leslie was able to demonstrate them prior to the end of the session. Leslie demonstrated good understanding of the home exercise program provided.     See EMR under Patient Instructions for exercises provided prior visit.        Assessment     Patient tolerated exercises well. Needs verbal cues for hand placement.     Leslie is progressing towards her goals and there are no updates to goals at this time. Patient prognosis is Good.     Patient will continue to benefit from skilled outpatient occupational therapy to address the deficits listed in the problem list on initial evaluation provide patient/family education and to maximize patient's level of independence in the home and community environment.     Patient's spiritual, cultural and educational needs considered and patient agreeable to plan of care and goals.    Anticipated barriers to occupational therapy: Cognition, home exercise program  compliance    Goals:     Short Term Goals:  6 weeks  Progress    Pain: Patient will demonstrate improved pain by reports of less than or equal to 7/10 worst pain on the verbal rating scale in order to progress toward maximal functional ability and improve quality of life. PC   Function: Patient will demonstrate improved function as indicated by a functional limitation score of less than or equal to 46 out of 100 on FOTO. PC   Mobility: Patient will improve active range of motion to 50% of stated goals, listed in objective measures above, in order to progress towards independence with functional activities.  PC   Strength: Patient will improve strength to 50% of stated goals, listed in objective measures above, in order to progress towards independence with functional activities.  PC   HEP: Patient will demonstrate independence with home exercise program in order to progress toward functional independence. PC      Long Term Goals:  12 weeks  Progress   Pain: Patient will demonstrate improved pain by reports of less than or equal to 6/10 worst pain on the verbal rating scale in order to progress toward maximal functional ability and improve quality of life.   PC   Function: Patient will demonstrate improved function as indicated by a functional limitation score of less than or equal to 48 out of 100 on FOTO. PC   Mobility: Patient will improve active range of motion to stated goals, listed in objective measures above, in order to return to maximal functional potential and improve quality of life. PC   Strength: Patient will improve strength to stated goals, listed in objective measures above, in order to improve functional independence and quality of life. PC   Patient will return to normal ADL's, IADL's, community involvement, recreational activities, and work-related activities with less than or equal to 5/10 pain and maximal function.  PC      Goals Key:  PC= Progressing/Continue;       PM= Partially Met;       GM=  Goal Met,      DC= Discontinue    Plan     Continue Plan of Care (POC) and progress per patient tolerance.      Ivana Gallo, OT  ,OTD, OTR/L

## 2024-05-20 NOTE — PROGRESS NOTES
[facility]  Speech Therapy Treatment Note- Neurological Rehabilitation  Date: 5/20/2024     Name: Leslie Wood   MRN: [id number]   Therapy Diagnosis:   Encounter Diagnosis   Name Primary?    Cognitive communication disorder Yes     Physician: Marily Millan NP  Physician Orders: Ambulatory Referral to Speech Therapy   Medical Diagnosis: Disorientation [R41.0], Aphasia [R47.01]     Visit #/ Visits Authorized: 15/20 (+eval, +5)  Date of Evaluation:  11/8/23  Insurance Authorization Period: 11/15/2023 - 12/31/2023   Plan of Care Expiration Date:  6/26/2024  Extended Plan of Care:  N/A  Progress Note: 6/26/2024    Time In:  10:35 AM  Time Out:  11:15 AM  Total Billable Time: 40 mins      Precautions: Standard, Fall, Cognition, and Communication  Subjective:   Patient reports: she has been staying between Ariane's house and [facility] house. She would like to return to her home but she is aware that is not safe. They have established care with the [facility] and have appointments with Neurology and Case management/social work.   She was compliant to home exercise program.     Response to previous treatment: good    Pain Scale: 8/10 on a Visual Analog Scale currently.  Pain Location: lower back  Objective:   TIMED  Procedure Min.   Cognitive Therapeutic Interventions, first 15 minutes CPT 16452  15   Cognitive Therapeutic Interventions, each additional 15 minutes CPT 59077  25           Short Term Goals: (6 weeks) Current Progress:   Patient will sustain attention to a simple task (auditory or visual) for 3 minutes with 90% accuracy or no more than 2 redirections.      Goal Met 12/13/2023   Auditory: met x2  Visual: met x7 Auditory Sustained Attention:  Several sustained auditory attention tasks completed in today's session. Patient listened to multi-step commands and followed with 91% accuracy. She required initial verbal cueing for use of strategies including taking requesting repetition as  needed. Improved use of strategies independently with overall improved accuracy in today's session.    Visual Sustained Attention:  Patient completed several visual sustained attention tasks in today's session in which she visually scanned for letters/numbers in a paragraph.   Trial 1: 1:30, 100% accuracy, 1/7 relative scale of cognitive load   Trial 2: 1:45, 100% accuracy, 1/7 relative scale of cognitive load   Trial 3: 1:30, 100% accuracy, 1/7 relative scale of cognitive load    Trial 4: 2:08, -7 (missed entire row of scanning)   Trial 5: 2:10, 100% accuracy, 1/7 relative scale of cognitive load   Trial 6: 1:30, 100% accuracy, 1/7 relative scale of cognitive load     Overall improved use of strategies to facilitate visual scanning pattern as well as good awareness of errors. Overall high accuracy of completion with low cognitive load.       2. Patient will complete selective attention tasks (auditory or visual) with 85% accuracy independently increase selective attention.                                                GOAL MET 3/18/2024 Auditory Selective Attention:  Patient listened for and followed complex 2 step commands in a dynamic environment with auditory and visual distractions during completion. She followed commands with 92% accuracy and initial verbal cueing for use of strategies. Discussion of how this relates to conversation and asking clarifying questions to facilitate attention and information processing. She rated task as 2/7 on relative scale of cognitive load.    She then listened to a 5 minute video about how basketballs are made. She requested to take notes as needed and paused/rewound video as needed. She answered questions about what she heard with 92% accuracy and rated task as 2/7 on relative scale of cognitive load.     Visual Selective Attention:  Patient participated in visual selective attention task of N-Back (level 1) pictures with natural background noise (therapy door remained  opened.   Trial 1: Patient completed task with 86% accuracy given minimal cueing from clinician  Trial 2: Patient completed task with 88% accuracy independently.     GOAL MET 3/18/2024   3. Patient will use attention shifting strategies to shift attention between two tasks (auditory or visual) with no more than 3 cues or 90% accuracy to improve alternating attention.              Auditory: GOAL MET 4/11/2024    Progressing/ Not Met 5/20/2024  Visual Alternating Attention: Patient completed several visual alternating tasks today. She was asked to alternate between either directions and a task, or two pages (one with stimuli and one with questions). Continued difficulty due to visuospatial deficits. Patient requires minimal-moderate cueing for alternating attention.    4. Patient will recall functional information (name, birthday, address, etc) following delays of 2-3 minutes following a prompt questions across 3 therapy sessions with 80% accuracy (spaced retrieval).      Progressing/ Not Met 5/20/2024   Not formally addressed     5. Patient will complete a functional task to improve attention, memory and/or executive functions (I.e. sample bill paying activity, recipe, or multiple choice comprehension questions to 1 paragraphs) with 80% accuracy and natural environment noise distractions (TV news background, music, etc.).      Progressing/ Not Met 5/20/2024    Not formally addressed     6. Patient will be educated regarding cognitive deficits as applicable to the patient's life for increased awareness and will execute returned demonstration of information with 80% accuracy.       Goal Met 1/24/2024  Patient and her granddaughter again educated regarding nature of cognitive deficits including a variety of foundational cognitive elements related to R hemisphere dysfunction following CVA including the cognitive triangle (with emphasis on foundational versus higher level cognitive functions, awareness of deficits, impact  of attention, information processing, memory, and executive functioning deficits as each area relates to assessment findings), spoon theory to discuss cognitive versus physical versus emotional fatigue and management of each to more efficiently use energy daily for more or less cognitively demanding tasks, internal versus external distractions (including emotions, pain, stressors, etc) and management of each, as well as overall impact of these deficits on daily functioning. Patient demonstrated awareness of information provided as demonstrated by asking good questions and expressing understanding. Discussed specifically use of mindfulness and meditation/breathing strategies to improve cognitive functioning related to internal, specifically emotional stressors.       7. Patient will participate in continued cognitive assessment of right hemisphere dysfunction.     Goal Met 2023         GOAL MET 2023   8. NEW GOAL: Patient will complete word finding tasks (VNEST, confrontational naming, picture description, SFA charts) with 90% accuracy given minimal cueing     Progressing/Not Met 2024  Completed X1. Patient required moderate cueing to identify category.    9. NEW GOAL: Patient will utilize word finding strategies during conversation with 90% accuracy given minimal cueing.     Progressing/Not Met 2024  Not formally addressed.        Patient Education/Response:   Patient educated regarding the followin. Alternating attention  2. Importance of follow up with Neurology  3. Word finding strategies    Home program established: Patient instructed to continue prior program  Patient verbalized understanding to all above education provided.     See Electronic Medical Record under Patient Instructions for exercises provided throughout therapy.  Assessment:   Patient is progressing steadily towards goals. She continues to require cueing for more complex tasks (sustained versus alternating attention  tasks).    Leslie is progressing well towards her goals. Current goals remain appropriate. Goals to be updated as necessary.     Patient prognosis is Fair. Patient will continue to benefit from skilled outpatient speech and language therapy to address the deficits listed in the problem list on initial evaluation, provide patient/family education and to maximize patient's level of independence in the home and community environment.     Medical necessity is demonstrated by the following IMPAIRMENTS:  Cognition: Deficits in executive functioning, attention, and memory prevent the pt from relaying medically and safety relevant information in a timely manner in a state of emergency.     Barriers to Therapy: NA  Patient's spiritual, cultural and educational needs considered and patient agreeable to plan of care and goals.  Plan:   Continue Plan of Care with focus on rehabilitation and compensation for attention, memory, and executive functioning deficits impacting safety and efficiency of daily task completion.     Becca Blanco, CCC-SLP, CBIS   Speech Language Pathologist   Certified Brain Injury Specialist   5/20/2024

## 2024-05-20 NOTE — TELEPHONE ENCOUNTER
Received message from Speech Therapy recommending patient for counseling needs. Spoke w/ patient's granddaughter; she called to schedule appt with LCSW for patient. Scheduled for Tuesday, June 18th at 10:00 a.m. Requesting referral from Dr. Gomez.

## 2024-05-23 ENCOUNTER — CLINICAL SUPPORT (OUTPATIENT)
Dept: REHABILITATION | Facility: HOSPITAL | Age: 84
End: 2024-05-23
Payer: MEDICARE

## 2024-05-23 ENCOUNTER — TELEPHONE (OUTPATIENT)
Dept: PRIMARY CARE CLINIC | Facility: CLINIC | Age: 84
End: 2024-05-23
Payer: MEDICARE

## 2024-05-23 ENCOUNTER — PATIENT MESSAGE (OUTPATIENT)
Dept: ADMINISTRATIVE | Facility: OTHER | Age: 84
End: 2024-05-23
Payer: MEDICARE

## 2024-05-23 ENCOUNTER — OUTPATIENT CASE MANAGEMENT (OUTPATIENT)
Dept: ADMINISTRATIVE | Facility: OTHER | Age: 84
End: 2024-05-23
Payer: MEDICARE

## 2024-05-23 DIAGNOSIS — R68.89 DECREASED STRENGTH, ENDURANCE, AND MOBILITY: ICD-10-CM

## 2024-05-23 DIAGNOSIS — Z74.09 DECREASED STRENGTH, ENDURANCE, AND MOBILITY: ICD-10-CM

## 2024-05-23 DIAGNOSIS — R53.1 DECREASED STRENGTH, ENDURANCE, AND MOBILITY: ICD-10-CM

## 2024-05-23 DIAGNOSIS — M46.1 SACROILIITIS, NOT ELSEWHERE CLASSIFIED: Primary | ICD-10-CM

## 2024-05-23 PROCEDURE — 97112 NEUROMUSCULAR REEDUCATION: CPT

## 2024-05-23 PROCEDURE — 97140 MANUAL THERAPY 1/> REGIONS: CPT

## 2024-05-23 PROCEDURE — 97110 THERAPEUTIC EXERCISES: CPT

## 2024-05-23 NOTE — PROGRESS NOTES
[facility]   Physical Therapy Treatment Note        Name: Leslie CASTELLON [facility] Number: [id number]    Therapy Diagnosis:   Encounter Diagnoses   Name Primary?    Sacroiliitis, not elsewhere classified Yes    Decreased strength, endurance, and mobility        Physician: Marily Millan NP    Visit Date: 5/23/2024    Physician Orders: PT Eval and Treat   Medical Diagnosis from Referral:   Late effect of cerebrovascular accident (CVA)   Hemiplegia and hemiparesis following cerebral infarction affecting left non-dominant side   Gait difficulty   Evaluation Date: 11/16/2023  Authorization Period Expiration: 10/16/2024  Plan of Care Expiration: 6/19/2024  Progress Note Due: 6/19/2024  Visit # / Visits authorized: 9/12 (+eval)  FOTO: 1/3      Precautions: Standard and Fall (1/8/24 - patient reports history of 3 back fusion surgeries)  PTA Visit #: 0/5     Time In: 1000  Time Out: 1100  Total Billable Time: 60 minutes   (Billing reflects 1 on 1 treatment time spent with patient)      Subjective     Patient reports:that she continues to experience a bit of pain at the lower back region    He/She was partially compliant with home exercise program.  Response to previous treatment: some pain relief  Functional change: Some increased speed with walking    Pain: 6/10     Location: lumbar paraspinal region    Objective      Objective Measures updated at progress report or POC update only unless otherwise noted.       Palpation: Trigger points on palpation of the lumbar paraspinal musculature    Treatment     Leslie received the treatments listed below:     THERAPEUTIC EXERCISES to develop strength, endurance, ROM, flexibility, posture, and core stabilization for (15) minutes including:    Intervention Performed Today    Nustep    15 minutes, L3   Bicycle  x X 8m   Lower trunk rotations x 10 second holds x 10   Pelvic tilts X Anterior/posterior with cueing- 10 second holds x 2 min   Sitting flexion with  "swiss ball  10x   Isometric adduction with ball   3  min   Abdominal brace x With single knee to chest and slowly lowering - 2x10   Supine x Active straight leg raise - 2x10   Long Arc Quads  3# ankle weight - 3x15       Leslie participated in neuromuscular re-education activities to improve: Balance, Coordination, Proprioception, and Posture for 30 minutes. The following activities were included:      Intervention 12/12/2023  Parameters   Sit to stand from 20 inch mat with no upper extremities assist x 10x/ 2 sets hands together   Prone hip extension  10x with cues   Sidelying hip series  2x10 abduction  10x/ 2 sets clamshell   Bridging  10 second holds x 2m   Step Ups  4 inch box   Standing with red theraband X  X  X Hip Extension - 2x10  Hip Abduction - 2x10  Marching - 2x10   Tandem stance  Hand Touches - 2x10   Matrix  X  x Single leg Knee Extension - 2x10 - 15#  Single leg knee flexion - 2x10, 15#        Plan for Next Visit: Progress as indicated      Leslie participated in dynamic functional therapeutic activities to improve functional performance for 15  minutes, including:    Intervention 5/23/2024  Parameters   Walking with 6# ball throw  2 passes   Large forward steps with small lunge  2x10                                                     MANUAL THERAPY TECHNIQUES were applied for (15) minutes, including:    Manual Intervention Performed Today    Soft Tissue Mobilization x  STM bilateral multifidus   Joint Mobilizations x PA glides lumbosacral   Mobilization with movement     Muscle energy techniques   "Shotgun" for + SI provocation   Functional Dry Needling  x FDN performed to the bilateral lumbosacral paraspinal/multifidus     Plan for Next Visit:         Patient Education and Home Exercises       Home Exercises Provided and Patient Education Provided     Education provided: (5) minutes  Moist heat pack to low back prior to exercises at home.    Written Home Exercises Provided: yes.  Exercises were " reviewed and Leslie was able to demonstrate them prior to the end of the session.  Leslie demonstrated fair  understanding of the education provided. See EMR under Patient Instructions for exercises provided during therapy sessions.    Assessment     Patient reports that she experiences pain at the lower back today.  She does report that knee pain is decreased.  Worked on gentle ROM and also performed FDN.  There was pain relief so able to perform resisted knee extension/flexion and closed chain exercises.  Will continue to progress into more strengthening and stabilization with continued treatment.    Leslie is progressing well towards her goals.   Patient prognosis is Fair.     Patient will continue to benefit from skilled outpatient physical therapy to address the deficits listed in the problem list box on initial evaluation, provide pt/family education and to maximize patient's level of independence in the home and community environment.     Patient's spiritual, cultural and educational needs considered and pt agreeable to plan of care and goals.     Anticipated Barriers for therapy: Anxiety or Panic Disorders, Arthritis, Back pain, Depression, Headaches, High  Blood Pressure, Kidney, Bladder, Prostate or Urination Problems, Osteoporosis, Prior Surgery, Stroke or TIA, Visual Impairment      Goals: Reviewed:5/23/2024       Short Term Goals: In 4 weeks   1.Patient to be educated on HEP. - met  2. Patient  to increase lumbar spine ROM to B SB 30 deg, B Rot 75% - PC  3. Patient  to increase hip PROM to 60 deg ER bilaterally, IR 20 deg bilaterally, in order to assist with more normalized gait pattern. - met  4. Patient  to increase B LE and core strength to 4-/5 in order to improve gait and balance.  - PC  5. Patient to increased right knee extn ROM to -5 deg for improved gait mechanics- PC  6. Patient   to have pain less than 2/10 at worst, to improve QOL. - PC  7. Patient  to improve score on the FOTO, to improve QOL.-  PC  8. Patient  to be educated on postural/body mechanics awareness. - PC     Long Term Goals: In 8 weeks  1.Patient to improve score on the FOTO to 48 or better, to improve QOL. - PC  2. Patient to demo increase in LE strength to 4/5, in order to improve endurance and increase ability to ambulate for increased time. - PC  3. Patient to have decreased pain to 2/10 at worst, to improve QOL. - met  4. Patient to demo increase lumbar ROM to ,  SB 30 deg, B Rot 90%in order to improve available range of motion for ADL's. - PC  .  5. Patient  to increase hip PROM to Extn +5 deg,  60 deg ER bilaterally, IR 20 deg bilaterally, in order to assist with more normalized gait pattern. - PC  6. Patient to increased right knee extn ROM to -5 deg for improved gait mechanics - PC  6. Patient to perform daily activities without increased symptoms including:  Prolonged walking x 10 minutes. - met  Ascending.descending 6 inch step without upper extremities assistance - PC  Return to gym and work outings 2 times per week with family transportation - met         Plan     Would like to extend treatment for 2 more months to allow the patient to return to an exercise regimen as she has had limited exercises over the last few weeks with the recent death of the patient's daughter.      Leoncio Lujan, PT

## 2024-05-23 NOTE — PROGRESS NOTES
Outpatient Care Management  Initial Patient Assessment    Patient: Leslie Wood  MRN: [id number]  Date of Service: 05/23/2024  Completed by: Zuri Olivas RN  Referral Date: 05/17/2024  Date of Eligibility: 5/18/2024  Program:   High Risk  Status: Ongoing  Effective Dates: 5/23/2024 - present  Responsible Staff: Zuri Olivas, RN        Reason for Visit   Patient presents with    [facility] Enrollment Call    Nursing Assessment       Brief Summary:  Leslie Wood was referred by Dr DONOVAN Gomez for moderate episode of recurrent major depressive disorder. Patient qualifies for program based on risk score of 88%.   Active problem list, medical, surgical and social history reviewed. Active comorbidities include hx of CVA, recurring depression, HTN, CAD, atherosclerosis of aorta, T2DM with current A1c of 5.6, sacroiliitis and impaired mobility and ADL's. Areas of need identified by patient include assistance with identifying community resources for socialization.   Next steps: Grand daughter/primary caregiver, Ariane, agrees to have educational literature about depression and cerebrovascular disease mailed and sent to Mrs Wood's pt portal account with follow up from this CM in 10-14 days to discuss both disease processes and measures to help manage them.   Message Dr Gomez to advise of request for an antidepressant med review and possible psych referral if needed.   Refer to LCSW to assist with identifying community resources for socialization. Advise of PHQ risk score of 18. 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.)  Vision Management: -- (Is followed by ophthalmology.)  Difficulty Concentrating, Remembering or Making Decisions: yes  Concentration Management: -- (Has some  memory issues.)  Communication Difficulty: no  Eating/Swallowing Difficulty: no  Eating/Swallowing Management: -- (Denies issues with eating or swallowing.)  Walking or Climbing Stairs Difficulty: yes  Walking or Climbing Stairs: ambulation difficulty, requires equipment  Mobility Management: -- (Uses a rollator for all mobility.)  Dressing/Bathing Difficulty: yes  Dressing/Bathing: bathing difficulty, assistance 1 person  Dressing/Bathing Management: -- (Grand daughter helps her get into and out of tub. She uses a shower chair.)  Toileting : Independent  Continence : Incontience - Bladder  Difficulty Managing Errands Independently: yes  Equipment Currently Used at Home: rollator; shower chair  ADL Conclusion Statement: -- (Is able to do some ADL's, but needs assistance with most iADL's.)  Change in Functional Status Since Onset of Current Illness/Injury: no        Spiritual Beliefs  Spiritual, Cultural Beliefs, Tenriism Practices, Values that Affect Care: no      Social History     Socioeconomic History    Marital status:    Tobacco Use    Smoking status: Never    Smokeless tobacco: Never   Substance and Sexual Activity    Alcohol use: No    Drug use: No    Sexual activity: Not Currently       Roles and Relationships  Primary Source of Support/Comfort: extended family  Name of Support/Comfort Primary Source: -- (Ariane)  Secondary Source of Support/Comfort: no one           Patient Reported Insurance  Verified current insurance plan:: Medicare; [employer]            5/23/2024     2:40 PM 4/23/2024     3:35 PM 10/14/2021     1:37 PM 7/22/2019     9:56 AM   Depression Patient Health Questionnaire   Over the last two weeks how often have you been bothered by little interest or pleasure in doing things More than half the days Not at all Not at all Not at all   Over the last two weeks how often have you been bothered by feeling down, depressed or hopeless More than half the days Not at all Not at all Not at all    PHQ-2 Total Score 4 0 0 0   Over the last two weeks how often have you been bothered by trouble falling or staying asleep, or sleeping too much Nearly every day      Over the last two weeks how often have you been bothered by feeling tired or having little energy More than half the days      Over the last two weeks how often have you been bothered by a poor appetite or overeating Not at all      Over the last two weeks how often have you been bothered by feeling bad about yourself - or that you are a failure or have let yourself or your family down Nearly every day      Over the last two weeks how often have you been bothered by trouble concentrating on things, such as reading the newspaper or watching television Nearly every day      Over the last two weeks how often have you been bothered by moving or speaking so slowly that other people could have noticed. Or the opposite - being so fidgety or restless that you have been moving around a lot more than usual. Nearly every day      Over the last two weeks how often have you been bothered by thoughts that you would be better off dead, or of hurting yourself Not at all      If you checked off any problems, how difficult have these problems made it for you to do your work, take care of things at home or get along with other people? Somewhat difficult      PHQ-9 Score 18      PHQ-9 Interpretation Moderately Severe          Learning Assessment       05/23/2024 1559 [facility] (5/23/2024 - Present)   Created by Zuri Olivas, RN - RN (Nurse) Status: Complete                 PRIMARY LEARNER     Primary Learner Name:  Ariane  - 05/23/2024 1559    Relationship:  Family  - 05/23/2024 1559    Does the primary learner have any barriers to learning?:  No Barriers  - 05/23/2024 1559    What is the preferred language of the primary learner?:  English  - 05/23/2024 1559    Is an  required?:  No  - 05/23/2024 1559    How does the primary learner  prefer to learn new concepts?:  Listening, Reading  - 05/23/2024 3198    How often do you need to have someone help you read instructions, pamphlets, or written material from your doctor or pharmacy?:  Never  - 05/23/2024 1556        CO-LEARNER #1     No question answered        CO-LEARNER #2     No question answered        SPECIAL TOPICS     No question answered        ANSWERED BY:     No question answered        Comments         Edit History       Zuri Olivas, RN - RN (Nurse)   05/23/2024 3387

## 2024-05-23 NOTE — LETTER
May 23, 2024             Dear Leslie,    Welcome to [facility] Complex Care Management Program.  It was a pleasure talking with you today.  My name is Zuri Olivas, and I look forward to being your Care Manager.  My goal is to help you function at the healthiest and highest level possible.  You can contact me directly at [phone].    As an [facility] patient, some of the services we may be able to provide include:     Development of an individualized care plan with a Registered Nurse   Connection with a   Connection with available resources and services    Coordinate communication among your care team members   Provide coaching and education   Help you understand your doctors treatment plan  Help you obtain information about your insurance coverage.     All services provided by [facility]s Complex Care Managers and other care team members are coordinated with and communicated to your primary care team.      As part of your enrollment, you will be receiving education materials and more information about these services in your [facility] account, by phone or through the mail.  If you do not wish to participate or receive information, please contact our office at [phone].      Sincerely,        Zuri Olivas, RN  [facility]   Out-patient RN Complex Care Manager

## 2024-05-23 NOTE — TELEPHONE ENCOUNTER
Staff forwarded message over to the PCP          ----- Message from Zuri Olivas RN sent at 5/23/2024  3:12 PM CDT -----  Dr Gomez/Staff,     Thank you for referring Mrs Wood to [facility]. I completed my initial screen with her grand daughter, Ariane, today with Mrs Wood's consent. Ariane's responses to the PHQ 9 depression screen resulted in a score of 18. Mrs Wood answered the question about SI and she denied this. Ariane reports Mrs Wood would like to see a counselor and the LCSW from [facility], Madina, has already reached out to her and scheduled an appt. Ariane also questioned about possibly changing her duloxetine or adding another antidepressant as it was prescribed by her pain doctor to help with pain management. They are ok with a psychiatry referral if that is necessary for an anti-depressant adjustment.     Thank you,     Zuri Olivas RN, [facility]  Outpatient Case Management  [phone]  [facility] [id number]  [email or web address]

## 2024-05-27 ENCOUNTER — CLINICAL SUPPORT (OUTPATIENT)
Dept: REHABILITATION | Facility: HOSPITAL | Age: 84
End: 2024-05-27
Payer: MEDICARE

## 2024-05-27 DIAGNOSIS — I69.354 HEMIPLEGIA AND HEMIPARESIS FOLLOWING CEREBRAL INFARCTION AFFECTING LEFT NON-DOMINANT SIDE: ICD-10-CM

## 2024-05-27 DIAGNOSIS — Z74.09 DECREASED STRENGTH, ENDURANCE, AND MOBILITY: ICD-10-CM

## 2024-05-27 DIAGNOSIS — R29.898 FINE MOTOR IMPAIRMENT: ICD-10-CM

## 2024-05-27 DIAGNOSIS — R29.898 DECREASED GRIP STRENGTH OF LEFT HAND: ICD-10-CM

## 2024-05-27 DIAGNOSIS — I69.30 LATE EFFECT OF CEREBROVASCULAR ACCIDENT (CVA): Primary | ICD-10-CM

## 2024-05-27 DIAGNOSIS — R53.1 DECREASED STRENGTH, ENDURANCE, AND MOBILITY: ICD-10-CM

## 2024-05-27 DIAGNOSIS — R68.89 DECREASED STRENGTH, ENDURANCE, AND MOBILITY: ICD-10-CM

## 2024-05-27 DIAGNOSIS — R29.818 FINE MOTOR IMPAIRMENT: ICD-10-CM

## 2024-05-27 DIAGNOSIS — Z78.9 DECREASED ACTIVITIES OF DAILY LIVING (ADL): ICD-10-CM

## 2024-05-27 DIAGNOSIS — R41.841 COGNITIVE COMMUNICATION DISORDER: Primary | ICD-10-CM

## 2024-05-27 PROCEDURE — 92507 TX SP LANG VOICE COMM INDIV: CPT | Mod: KX

## 2024-05-27 PROCEDURE — 97110 THERAPEUTIC EXERCISES: CPT | Mod: 59

## 2024-05-27 PROCEDURE — 97530 THERAPEUTIC ACTIVITIES: CPT

## 2024-05-27 PROCEDURE — 97112 NEUROMUSCULAR REEDUCATION: CPT

## 2024-05-27 NOTE — PROGRESS NOTES
[facility]  Speech Therapy Treatment Note- Neurological Rehabilitation  Date: 5/27/2024     Name: Leslie Wood   MRN: [id number]   Therapy Diagnosis:   Encounter Diagnosis   Name Primary?    Cognitive communication disorder Yes     Physician: Marily Millan NP  Physician Orders: Ambulatory Referral to Speech Therapy   Medical Diagnosis: Disorientation [R41.0], Aphasia [R47.01]     Visit #/ Visits Authorized: 16/20 (+eval, +5)  Date of Evaluation:  11/8/23  Insurance Authorization Period: 11/15/2023 - 12/31/2023   Plan of Care Expiration Date:  6/26/2024  Extended Plan of Care:  N/A  Progress Note: 6/26/2024    Time In:  9:45 AM  Time Out: 10:30 AM  Total Billable Time: 45 mins      Precautions: Standard, Fall, Cognition, and Communication  Subjective:   Patient reports: They have established care with the [facility] and have appointments with Neurology and Case management/social work.   She was compliant to home exercise program.     Response to previous treatment: good    Pain Scale: 8/10 on a Visual Analog Scale currently.  Pain Location: lower back  Objective:   TIMED  Procedure Min.   Cognitive Therapeutic Interventions, first 15 minutes CPT 00725  15   Cognitive Therapeutic Interventions, each additional 15 minutes CPT 33796  30           Short Term Goals: (6 weeks) Current Progress:   Patient will sustain attention to a simple task (auditory or visual) for 3 minutes with 90% accuracy or no more than 2 redirections.      Goal Met 12/13/2023   Auditory: met x2  Visual: met x7 Auditory Sustained Attention:  Several sustained auditory attention tasks completed in today's session. Patient listened to multi-step commands and followed with 91% accuracy. She required initial verbal cueing for use of strategies including taking requesting repetition as needed. Improved use of strategies independently with overall improved accuracy in today's session.    Visual Sustained Attention:  Patient  completed several visual sustained attention tasks in today's session in which she visually scanned for letters/numbers in a paragraph.   Trial 1: 1:30, 100% accuracy, 1/7 relative scale of cognitive load   Trial 2: 1:45, 100% accuracy, 1/7 relative scale of cognitive load   Trial 3: 1:30, 100% accuracy, 1/7 relative scale of cognitive load    Trial 4: 2:08, -7 (missed entire row of scanning)   Trial 5: 2:10, 100% accuracy, 1/7 relative scale of cognitive load   Trial 6: 1:30, 100% accuracy, 1/7 relative scale of cognitive load     Overall improved use of strategies to facilitate visual scanning pattern as well as good awareness of errors. Overall high accuracy of completion with low cognitive load.       2. Patient will complete selective attention tasks (auditory or visual) with 85% accuracy independently increase selective attention.                                                GOAL MET 3/18/2024 Auditory Selective Attention:  Patient listened for and followed complex 2 step commands in a dynamic environment with auditory and visual distractions during completion. She followed commands with 92% accuracy and initial verbal cueing for use of strategies. Discussion of how this relates to conversation and asking clarifying questions to facilitate attention and information processing. She rated task as 2/7 on relative scale of cognitive load.    She then listened to a 5 minute video about how basketballs are made. She requested to take notes as needed and paused/rewound video as needed. She answered questions about what she heard with 92% accuracy and rated task as 2/7 on relative scale of cognitive load.     Visual Selective Attention:  Patient participated in visual selective attention task of N-Back (level 1) pictures with natural background noise (therapy door remained opened.   Trial 1: Patient completed task with 86% accuracy given minimal cueing from clinician  Trial 2: Patient completed task with 88%  accuracy independently.     GOAL MET 3/18/2024   3. Patient will use attention shifting strategies to shift attention between two tasks (auditory or visual) with no more than 3 cues or 90% accuracy to improve alternating attention.          Auditory: GOAL MET 4/11/2024  Progressing/ Not Met 5/27/2024  Visual Alternating Attention:   Patient completed visual alternating task of adding appointments into a calendar with 80% accuracy given minimal-moderate cueing. She often added information that was not provided or had difficulty recalling information when she alternated her attention to the calendar. She rated this task 2/7 on the relative scale of cognitive load. However, she required consistent cueing to complete the task accurately. This indicates ongoing deficits with insight.    4. Patient will recall functional information (name, birthday, address, etc) following delays of 2-3 minutes following a prompt questions across 3 therapy sessions with 80% accuracy (spaced retrieval).      Progressing/ Not Met 5/27/2024   Not formally addressed     5. Patient will complete a functional task to improve attention, memory and/or executive functions (I.e. sample bill paying activity, recipe, or multiple choice comprehension questions to 1 paragraphs) with 80% accuracy and natural environment noise distractions (TV news background, music, etc.).      Progressing/ Not Met 5/27/2024    Not formally addressed     6. Patient will be educated regarding cognitive deficits as applicable to the patient's life for increased awareness and will execute returned demonstration of information with 80% accuracy.       Goal Met 1/24/2024  Patient and her granddaughter again educated regarding nature of cognitive deficits including a variety of foundational cognitive elements related to R hemisphere dysfunction following CVA including the cognitive triangle (with emphasis on foundational versus higher level cognitive functions, awareness of  deficits, impact of attention, information processing, memory, and executive functioning deficits as each area relates to assessment findings), spoon theory to discuss cognitive versus physical versus emotional fatigue and management of each to more efficiently use energy daily for more or less cognitively demanding tasks, internal versus external distractions (including emotions, pain, stressors, etc) and management of each, as well as overall impact of these deficits on daily functioning. Patient demonstrated awareness of information provided as demonstrated by asking good questions and expressing understanding. Discussed specifically use of mindfulness and meditation/breathing strategies to improve cognitive functioning related to internal, specifically emotional stressors.       7. Patient will participate in continued cognitive assessment of right hemisphere dysfunction.     Goal Met 2023         GOAL MET 2023   8. NEW GOAL: Patient will complete word finding tasks (VNEST, confrontational naming, picture description, SFA charts) with 90% accuracy given minimal cueing     Progressing/Not Met 2024  Completed X2. Patient generating subjects/objects during VNEST activity with 80% accuracy given minimal cueing. She expanded sentences with where, when, or why with 80% accuracy given minimal cueing. When reading the sentences, she would often forget that she had already created sentences and make up new sentences. Given cueing, she was able to read sentences with 90% accuracy.    9. NEW GOAL: Patient will utilize word finding strategies during conversation with 90% accuracy given minimal cueing.     Progressing/Not Met 2024  Discussed word finding strategies such as description, example, naming the function. She was able to use this in conversation with verbal cueing.        Patient Education/Response:   Patient educated regarding the followin. Alternating attention  2. Importance of follow  up with Neurology  3. Word finding strategies    Home program established: Patient instructed to continue prior program  Patient verbalized understanding to all above education provided.     See Electronic Medical Record under Patient Instructions for exercises provided throughout therapy.  Assessment:   Patient is progressing steadily towards goals. She continues to require cueing for more complex tasks (sustained versus alternating attention tasks).    Leslie is progressing well towards her goals. Current goals remain appropriate. Goals to be updated as necessary.     Patient prognosis is Fair. Patient will continue to benefit from skilled outpatient speech and language therapy to address the deficits listed in the problem list on initial evaluation, provide patient/family education and to maximize patient's level of independence in the home and community environment.     Medical necessity is demonstrated by the following IMPAIRMENTS:  Cognition: Deficits in executive functioning, attention, and memory prevent the pt from relaying medically and safety relevant information in a timely manner in a state of emergency.     Barriers to Therapy: NA  Patient's spiritual, cultural and educational needs considered and patient agreeable to plan of care and goals.  Plan:   Continue Plan of Care with focus on rehabilitation and compensation for attention, memory, and executive functioning deficits impacting safety and efficiency of daily task completion.     Becca Blanco, CCC-SLP, CBIS   Speech Language Pathologist   Certified Brain Injury Specialist   5/27/2024

## 2024-05-27 NOTE — PROGRESS NOTES
[facility]  Occupational Therapy Treatment Note and Discharge Summary     Date: 5/27/2024  Name: Leslie CASTELLON [facility] Number: [id number]    Therapy Diagnosis:     Encounter Diagnoses   Name Primary?    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     Decreased  strength of left hand     Decreased strength, endurance, and mobility     Decreased activities of daily living (ADL)     Fine motor impairment        Physician: Marily Millan NP    Physician Orders: Occupational Therapy to Evaluate and Treat   Medical Diagnosis: R29.898 (ICD-10-CM) - Poor fine motor skills  Surgical Procedure and Date: N/A     Evaluation Date: 11/8/2023  Insurance Authorization Period Expiration: 11/8/2023 - 12/31/2023  Plan of Care Certification Period: 11/8/2023 - 02/08/2024  Updated Plan of Care Certification Period: 02/08/2024 - 05/21/2024  Progress Note Due: 5/21/2024     Date of Return to MD: N/A     Visit # / Visits authorized: 10/25 (16 Episode Visits)  FOTO: 1/3     Precautions:  Standard    Time In: 9:05  Time Out: 9:45  Total Treatment Time: 40 minutes  Total Billable Time: 40 minutes    Subjective     Patient reports: She is doing well. She is currently stable and is ready for discharge from occupational therapy     she was compliant with home exercise program given last session.   Response to previous treatment: Tolerated well  Functional change: Improved knowledge of HEP    Pain: 4/10  Location: bilateral hands     Objective     Please see progress noted for updated objective measures    Treatment     Supervised Modalities: Modalities such as hot/cold packs, traction, unattended electrical stimulation, paraffin bath, fluidotherapy to reduce pain and increase soft tissue extensibility. Leslie received (0) minutes of modalities listed below after being cleared for contraindications.  x = modalities performed      Supervised Modalities 5/27/2024                            Manual Therapy Techniques: Joint mobilizations, Soft tissue Mobilization, and mobilization with movement applied to the area listed below. Leslie received (0) minutes of interventions. x = intervention performed today    Manual Intervention 5/27/2024    Soft Tissue Mobilization     Joint Mobilizations     Mobilization with movement              Therapeutic Exercises: to develop strength, endurance, ROM, flexibility, posture and core stabilization. Leslie received (15) minutes of exercises listed below. x = performed today * = level of progression of activity     Therapeutic Exercise 5/27/2024    Interossei  -green foam  -rubber bands x 20x bilateral    Digi flexion - red x 20x bilateral    Resistive pinch - red  -2pt  -3pt  -lateral x 20x bilateral    Thumb palmar   -adduction  -abduction x 20x bilateral    Thumb radial  -adduction  -abduction x 20x bilateral    Forearm exercise  - wrist flexion  -wrist extension  - supination  -pronation x 2lb        Therapeutic Activities: Everyday tasks to address the combined need of improved strength, range of motion, and endurance to achieve increased independence. While simulating these activities, the person is applying the gained skills of strength and improved range of motion in a practical way.  Leslie received (15) minutes of activities listed below. x = activities performed today * = level of progression of activity     Therapeutic Activities 5/27/2024    Velcro board  -lateral pinch  - dowel  x 20x bilateral    Opening and closing containers x 5 minutes, ergonomics               Neuromuscular Re-education: the use of functional strengthening, stretching, balancing and coordination activities that train the nerves and muscles to react and communicate to restore normal body movement patterns to increase self care independence. Leslie received (10) minutes of interventions listed below.  x = interventions  performed today * = level of progression of activity     Neuromuscular Re-education 5/27/2024    Finger opposition  x 20x   Finger taps (extension) x 20x   Finger opposition with slides x    Table Top active assist range of motion  - shoulder flexion  - shoulder abduction  x 5x - 15 second holds     Self Care: Fundamental skills required to independently care for oneself. Activities of daily living such as eating, bathing, dressing, toileting, grooming, sleeping, transfers and mobility. Instrumental activities of daily living such as driving, medication management, pet care, home management/cleaning, financial management, using the phone, meal preparation and shopping. Leslie received (0) minutes of interventions listed below.  x = interventions performed * = level of progression of activity     Self Care 5/27/2024                            Home Exercises and Education Provided     Education provided:   - home exercise program education provided  - progress towards goals     Written Home Exercises Provided: Patient instructed to cont prior HEP.  Exercises were reviewed and Leslie was able to demonstrate them prior to the end of the session. Leslie demonstrated good understanding of the home exercise program provided.     See EMR under Patient Instructions for exercises provided prior visit.        Assessment     Patient tolerated exercises well. Needs verbal cues for hand placement.     Leslie is progressing towards her goals and there are no updates to goals at this time. Patient prognosis is Good.     Patient will continue to benefit from skilled outpatient occupational therapy to address the deficits listed in the problem list on initial evaluation provide patient/family education and to maximize patient's level of independence in the home and community environment.     Patient's spiritual, cultural and educational needs considered and patient agreeable to plan of care and goals.    Anticipated barriers to occupational  therapy: Cognition, home exercise program compliance    Goals:     Short Term Goals:  6 weeks  Progress    Pain: Patient will demonstrate improved pain by reports of less than or equal to 7/10 worst pain on the verbal rating scale in order to progress toward maximal functional ability and improve quality of life. PC   Function: Patient will demonstrate improved function as indicated by a functional limitation score of less than or equal to 46 out of 100 on FOTO. PC   Mobility: Patient will improve active range of motion to 50% of stated goals, listed in objective measures above, in order to progress towards independence with functional activities.  PC   Strength: Patient will improve strength to 50% of stated goals, listed in objective measures above, in order to progress towards independence with functional activities.  PC   HEP: Patient will demonstrate independence with home exercise program in order to progress toward functional independence. PC      Long Term Goals:  12 weeks  Progress   Pain: Patient will demonstrate improved pain by reports of less than or equal to 6/10 worst pain on the verbal rating scale in order to progress toward maximal functional ability and improve quality of life.   PC   Function: Patient will demonstrate improved function as indicated by a functional limitation score of less than or equal to 48 out of 100 on FOTO. PC   Mobility: Patient will improve active range of motion to stated goals, listed in objective measures above, in order to return to maximal functional potential and improve quality of life. PC   Strength: Patient will improve strength to stated goals, listed in objective measures above, in order to improve functional independence and quality of life. PC   Patient will return to normal ADL's, IADL's, community involvement, recreational activities, and work-related activities with less than or equal to 5/10 pain and maximal function.  PC      Goals Key:  PC=  Progressing/Continue;       PM= Partially Met;       GM= Goal Met,      DC= Discontinue      Discharge reason: Patient has reached the maximum rehab potential for the present time    Plan   This patient is discharged from Occupational Therapy    Ivana Gallo OT ,OTD, OTR/L  5/27/2024

## 2024-05-29 ENCOUNTER — OFFICE VISIT (OUTPATIENT)
Dept: PRIMARY CARE CLINIC | Facility: CLINIC | Age: 84
End: 2024-05-29
Payer: MEDICARE

## 2024-05-29 VITALS
DIASTOLIC BLOOD PRESSURE: 62 MMHG | BODY MASS INDEX: 25.07 KG/M2 | SYSTOLIC BLOOD PRESSURE: 108 MMHG | HEART RATE: 56 BPM | OXYGEN SATURATION: 96 % | HEIGHT: 61 IN | WEIGHT: 132.81 LBS | TEMPERATURE: 98 F

## 2024-05-29 DIAGNOSIS — F33.1 MODERATE EPISODE OF RECURRENT MAJOR DEPRESSIVE DISORDER: Primary | ICD-10-CM

## 2024-05-29 DIAGNOSIS — R42 DIZZINESS: ICD-10-CM

## 2024-05-29 DIAGNOSIS — F43.21 GRIEF: ICD-10-CM

## 2024-05-29 DIAGNOSIS — I69.30 LATE EFFECT OF CEREBROVASCULAR ACCIDENT (CVA): ICD-10-CM

## 2024-05-29 DIAGNOSIS — E78.5 HYPERLIPIDEMIA LDL GOAL <100: Chronic | ICD-10-CM

## 2024-05-29 DIAGNOSIS — N18.31 STAGE 3A CHRONIC KIDNEY DISEASE: ICD-10-CM

## 2024-05-29 DIAGNOSIS — I42.8 NONISCHEMIC CARDIOMYOPATHY: ICD-10-CM

## 2024-05-29 PROCEDURE — G2211 COMPLEX E/M VISIT ADD ON: HCPCS | Mod: S$PBB,,, | Performed by: FAMILY MEDICINE

## 2024-05-29 PROCEDURE — 99214 OFFICE O/P EST MOD 30 MIN: CPT | Mod: S$PBB,,, | Performed by: FAMILY MEDICINE

## 2024-05-29 PROCEDURE — 99999 PR PBB SHADOW E&M-EST. PATIENT-LVL V: CPT | Mod: PBBFAC,,, | Performed by: FAMILY MEDICINE

## 2024-05-29 PROCEDURE — 99215 OFFICE O/P EST HI 40 MIN: CPT | Mod: PBBFAC,PN | Performed by: FAMILY MEDICINE

## 2024-05-29 RX ORDER — BUSPIRONE HYDROCHLORIDE 5 MG/1
5 TABLET ORAL 2 TIMES DAILY
Qty: 60 TABLET | Refills: 5 | Status: SHIPPED | OUTPATIENT
Start: 2024-05-29

## 2024-05-29 NOTE — PATIENT INSTRUCTIONS
Rx buspirone 5 mg twice daily. May go up to 2 pills twice daily. Increase slowly, no more than once a week.  Encourage hydration, see if improves dizziness    If you are feeling unwell, we'd like to be the first ones to know here at [facility]! Please give us a call. Same day appointments are our top priority to keep you well and out of the emergency rooms and hospitals. Call [phone] for our direct line. After hours advice is always available. Please call [phone] after hours to speak to the on-call team.

## 2024-05-29 NOTE — PROGRESS NOTES
Leslie CASTELLON Kane  05/29/2024  [id number]    Natalia Gomez MD  Patient Care Team:  Natalia Gomez MD as PCP - General (Family Medicine)  Elva Israel MD as Consulting Physician (Pain Medicine)  Daniel Bonilla MD (Ophthalmology)  Viki Ruiz PA-C as Physician Assistant (Internal Medicine)  Urvashi Valle as ED Navigator  Program, Medicare Shared Savings as Hypertension Digital Medicine Contract  Mckayla Eisenberg MD as Hypertension Digital Medicine Responsible Provider (Family Medicine)  Norma Graff, PharmD as Hypertension Digital Medicine Clinician (Pharmacist)  Zuri Olivas RN as Outpatient   Lucy Sim LMSW as Outpatient Case Management Social Worker    Visit Type: Follow-up    Chief Complaint:  Chief Complaint   Patient presents with    Medication Refill     Medication adjustment    Dizziness    Extremity Weakness     Weakness in both legs        History of Present Illness: coping with loss of daughter 3 mo ago. Needs medicine adjustment. On duloxetine 2-3 years for pain management, not helping with current stress of grieving  Reports dizziness at times. No associated chest pain and sob  Legs weak. 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   Neurology appt pending - cognition eval  Outpatient case management on the case for additional resource for her  Hx of prediabetes, when she weighed more than 200 lbs. Recent A1C low 5s. Not on any glycemic control medications- will take this dx off her list  PHQ-4 Score: 9     Hosp/ED/Urgent Care:    Recent appointments:     Upcoming appointments:  Future Appointments       Next 10 Appointments       Date Provider Specialty Appt Notes    5/30/2024 Becca Blanco CCC-SLP Outpatient Rehab no copay    becca/gerardo    5/30/2024 Leoncio Lujan PT Outpatient Rehab no copay    James    5/30/2024  Radiology Check bones    6/3/2024 Becca Blanco CCC-SLP Outpatient Rehab no copay    becca/gerardo    6/3/2024 Tai  Leoncio HARRIS PT Outpatient Rehab no katharine Ramirez    6/5/2024 Raghu Felix MD Neurology Cognitive decline [R41.89]    6/6/2024 Becca Blanco, SEBASTIAN-SLP Outpatient Rehab no katharine sotelo/gerardo    6/18/2024 Madina Krishnamurthy, LCSW Primary Care Initial LCSW Visit    6/26/2024 Natalia Gomez MD Primary Care 4 week follow up    7/17/2024 Natalia Gomez MD Primary Care 2 month f/u              Displaying the next 10 appointments. This patient has additional appointments scheduled.             The following were reviewed: Active problem list, medication list, allergies, family history, social history, and Health Maintenance.     Medications have been reviewed and reconciled with patient at visit today.    Review of Systems   See HPI above    Exam:  Vitals:    05/29/24 1417   BP: 108/62   Pulse: (!) 56   Temp: 98.3 °F (36.8 °C)     Weight: 60.2 kg (132 lb 13.2 oz)   Body mass index is 25.1 kg/m².    BP Readings from Last 3 Encounters:   05/29/24 108/62   05/17/24 110/66   04/24/24 126/67        Wt Readings from Last 3 Encounters:   05/29/24 1417 60.2 kg (132 lb 13.2 oz)   05/17/24 0929 59.9 kg (132 lb 2.7 oz)   04/25/24 0953 60.7 kg (133 lb 13.1 oz)        Physical Exam  Vitals and nursing note reviewed.   Constitutional:       General: She is not in acute distress.  Cardiovascular:      Rate and Rhythm: Normal rate and regular rhythm.      Heart sounds: No murmur heard.  Pulmonary:      Effort: Pulmonary effort is normal.      Breath sounds: Normal breath sounds.   Musculoskeletal:      Right lower leg: No edema.      Left lower leg: No edema.   Skin:     General: Skin is warm.      Capillary Refill: Capillary refill takes less than 2 seconds.   Neurological:      Mental Status: She is alert and oriented to person, place, and time.      Gait: Gait abnormal (using walker).          Laboratory Reviewed    Lab Results   Component Value Date    WBC 6.03 04/16/2024    HGB 13.4 04/16/2024    HCT 41.9 04/16/2024      04/16/2024    CHOL 128 04/03/2024    TRIG 78 04/03/2024    HDL 46 04/03/2024    ALT 20 04/16/2024    AST 23 04/16/2024     04/16/2024    K 4.6 04/16/2024     04/16/2024    CREATININE 0.8 04/16/2024    BUN 16 04/16/2024    CO2 27 04/16/2024    TSH 1.388 11/08/2023    INR 1.0 02/02/2023    HGBA1C 5.6 11/08/2023       Screening or Prevention Patient's value Goal Complete/Controlled?   HgA1C Testing and Control   Lab Results   Component Value Date    HGBA1C 5.6 11/08/2023      Annually/Less than 8% Yes   Lipid profile : 04/03/2024 Annually Yes   LDL control Lab Results   Component Value Date    LDLCALC 66.4 04/03/2024    Annually/Less than 100 mg/dl  Yes   Nephropathy screening Lab Results   Component Value Date    LABMICR 11.0 03/02/2023     Lab Results   Component Value Date    PROTEINUA Negative 04/23/2024    Annually Yes   Blood pressure BP Readings from Last 1 Encounters:   05/29/24 108/62    Less than 140/90 Yes   Dilated retinal exam : 04/20/2023 Annually Yes   Foot exam   Most Recent Foot Exam Date: Not Found Annually Yes       Health Maintenance  Health Maintenance Topics with due status: Not Due       Topic Last Completion Date    TETANUS VACCINE 06/24/2019    Lipid Panel 04/03/2024     Health Maintenance Due   Topic Date Due    Shingles Vaccine (1 of 2) Never done    RSV Vaccine (Age 60+ and Pregnant patients) (1 - 1-dose 60+ series) Never done    COVID-19 Vaccine (7 - 2023-24 season) 09/01/2023    Diabetes Urine Screening  03/02/2024    DEXA Scan  03/04/2024    Eye Exam  04/20/2024    Hemoglobin A1c  05/08/2024       Assessment:   83 y.o. female with multiple co-morbid illnesses here for continued follow up of medical problems.      The primary encounter diagnosis was Moderate episode of recurrent major depressive disorder. Diagnoses of Grief, Dizziness, Late effect of cerebrovascular accident (CVA), and Nonischemic cardiomyopathy were also pertinent to this visit.    Health Maintenance          Date Due Completion Date    Shingles Vaccine (1 of 2) Never done ---    RSV Vaccine (Age 60+ and Pregnant patients) (1 - 1-dose 60+ series) Never done ---    COVID-19 Vaccine (7 - 2023-24 season) 09/01/2023 12/31/2021    Diabetes Urine Screening 03/02/2024 3/2/2023    DEXA Scan 03/04/2024 3/4/2021    Eye Exam 04/20/2024 4/20/2023    Hemoglobin A1c 05/08/2024 11/8/2023    Lipid Panel 04/03/2025 4/3/2024    TETANUS VACCINE 06/24/2029 6/24/2019            Plan:   1. Moderate episode of recurrent major depressive disorder  -     busPIRone (BUSPAR) 5 MG Tab; Take 1 tablet (5 mg total) by mouth 2 (two) times daily.  Dispense: 60 tablet; Refill: 5    2. Grief    3. Dizziness    4. Late effect of cerebrovascular accident (CVA)  Overview:  Cva at age 80 and 82, RLE weak, LUE weak resolved. expressive aphasia, in PT/OT/speech    Assessment & Plan:  Weakness in LE - still working with PT and speech. Case closed with OT      5. Nonischemic cardiomyopathy  Overview:  Takotsubo cardiomyopathy (apical ballooning syndrome)           -Patient's lab results were reviewed and discussed with patient  -Treatment options and alternatives were discussed with the patient. Patient expressed understanding. Patient was given the opportunity to ask questions and be an active participant in their medical care. Patient had no further questions or concerns at this time.     Follow up: Follow up in about 4 weeks (around 6/26/2024).

## 2024-05-30 ENCOUNTER — CLINICAL SUPPORT (OUTPATIENT)
Dept: REHABILITATION | Facility: HOSPITAL | Age: 84
End: 2024-05-30
Payer: MEDICARE

## 2024-05-30 ENCOUNTER — APPOINTMENT (OUTPATIENT)
Dept: RADIOLOGY | Facility: HOSPITAL | Age: 84
End: 2024-05-30
Attending: FAMILY MEDICINE
Payer: MEDICARE

## 2024-05-30 DIAGNOSIS — Z78.0 MENOPAUSE: ICD-10-CM

## 2024-05-30 DIAGNOSIS — R53.1 DECREASED STRENGTH, ENDURANCE, AND MOBILITY: ICD-10-CM

## 2024-05-30 DIAGNOSIS — R68.89 DECREASED STRENGTH, ENDURANCE, AND MOBILITY: ICD-10-CM

## 2024-05-30 DIAGNOSIS — M46.1 SACROILIITIS, NOT ELSEWHERE CLASSIFIED: Primary | ICD-10-CM

## 2024-05-30 DIAGNOSIS — Z74.09 DECREASED STRENGTH, ENDURANCE, AND MOBILITY: ICD-10-CM

## 2024-05-30 DIAGNOSIS — R41.841 COGNITIVE COMMUNICATION DISORDER: Primary | ICD-10-CM

## 2024-05-30 PROCEDURE — 77080 DXA BONE DENSITY AXIAL: CPT | Mod: 26,,, | Performed by: RADIOLOGY

## 2024-05-30 PROCEDURE — 97140 MANUAL THERAPY 1/> REGIONS: CPT

## 2024-05-30 PROCEDURE — 97129 THER IVNTJ 1ST 15 MIN: CPT | Mod: KX

## 2024-05-30 PROCEDURE — 97130 THER IVNTJ EA ADDL 15 MIN: CPT | Mod: KX

## 2024-05-30 PROCEDURE — 97110 THERAPEUTIC EXERCISES: CPT

## 2024-05-30 PROCEDURE — 97112 NEUROMUSCULAR REEDUCATION: CPT

## 2024-05-30 PROCEDURE — 77080 DXA BONE DENSITY AXIAL: CPT | Mod: TC

## 2024-05-30 NOTE — PROGRESS NOTES
[facility]  Speech Therapy Treatment Note- Neurological Rehabilitation  Date: 5/30/2024     Name: Leslie Wood   MRN: [id number]   Therapy Diagnosis:   Encounter Diagnosis   Name Primary?    Cognitive communication disorder Yes     Physician: Marily Millan NP  Physician Orders: Ambulatory Referral to Speech Therapy   Medical Diagnosis: Disorientation [R41.0], Aphasia [R47.01]     Visit #/ Visits Authorized: 17/20 (+eval, +5)  Date of Evaluation:  11/8/23  Insurance Authorization Period: 11/15/2023 - 12/31/2023   Plan of Care Expiration Date:  6/26/2024  Extended Plan of Care:  N/A  Progress Note: 6/26/2024    Time In:  9:27 AM  Time Out: 10:45 AM  Total Billable Time: 68 mins      Precautions: Standard, Fall, Cognition, and Communication  Subjective:   Patient reports: They have established care with the [facility] and have appointments with Neurology and Case management/social work.   She was compliant to home exercise program. She reports increased anxiety and has started a new medication.     Response to previous treatment: good    Pain Scale: 8/10 on a Visual Analog Scale currently.  Pain Location: lower back  Objective:   TIMED  Procedure Min.   Cognitive Therapeutic Interventions, first 15 minutes CPT 27234  15   Cognitive Therapeutic Interventions, each additional 15 minutes CPT 05237  53           Short Term Goals: (6 weeks) Current Progress:   Patient will sustain attention to a simple task (auditory or visual) for 3 minutes with 90% accuracy or no more than 2 redirections.      Goal Met 12/13/2023   Auditory: met x2  Visual: met x7 Auditory Sustained Attention:  Several sustained auditory attention tasks completed in today's session. Patient listened to multi-step commands and followed with 91% accuracy. She required initial verbal cueing for use of strategies including taking requesting repetition as needed. Improved use of strategies independently with overall improved  accuracy in today's session.    Visual Sustained Attention:  Patient completed several visual sustained attention tasks in today's session in which she visually scanned for letters/numbers in a paragraph.   Trial 1: 1:30, 100% accuracy, 1/7 relative scale of cognitive load   Trial 2: 1:45, 100% accuracy, 1/7 relative scale of cognitive load   Trial 3: 1:30, 100% accuracy, 1/7 relative scale of cognitive load    Trial 4: 2:08, -7 (missed entire row of scanning)   Trial 5: 2:10, 100% accuracy, 1/7 relative scale of cognitive load   Trial 6: 1:30, 100% accuracy, 1/7 relative scale of cognitive load     Overall improved use of strategies to facilitate visual scanning pattern as well as good awareness of errors. Overall high accuracy of completion with low cognitive load.       2. Patient will complete selective attention tasks (auditory or visual) with 85% accuracy independently increase selective attention.                                                GOAL MET 3/18/2024 Auditory Selective Attention:  Patient listened for and followed complex 2 step commands in a dynamic environment with auditory and visual distractions during completion. She followed commands with 92% accuracy and initial verbal cueing for use of strategies. Discussion of how this relates to conversation and asking clarifying questions to facilitate attention and information processing. She rated task as 2/7 on relative scale of cognitive load.    She then listened to a 5 minute video about how basketballs are made. She requested to take notes as needed and paused/rewound video as needed. She answered questions about what she heard with 92% accuracy and rated task as 2/7 on relative scale of cognitive load.     Visual Selective Attention:  Patient participated in visual selective attention task of N-Back (level 1) pictures with natural background noise (therapy door remained opened.   Trial 1: Patient completed task with 86% accuracy given minimal  cueing from clinician  Trial 2: Patient completed task with 88% accuracy independently.     GOAL MET 3/18/2024   3. Patient will use attention shifting strategies to shift attention between two tasks (auditory or visual) with no more than 3 cues or 90% accuracy to improve alternating attention.     Auditory: GOAL MET 4/11/2024  Progressing/ Not Met 5/30/2024  Visual Alternating Attention:   Patient completed visual alternating task of adding her appointments into a calendar. She required consistent moderate cueing to locate the date/time and appointment and to transfer to her calendar.   Red anchor placed on the left side of her appointments to aid with L neglect.    4. Patient will recall functional information (name, birthday, address, etc) following delays of 2-3 minutes following a prompt questions across 3 therapy sessions with 80% accuracy (spaced retrieval).      Progressing/ Not Met 5/30/2024   Not formally addressed     5. Patient will complete a functional task to improve attention, memory and/or executive functions (I.e. sample bill paying activity, recipe, or multiple choice comprehension questions to 1 paragraphs) with 80% accuracy and natural environment noise distractions (TV news background, music, etc.).      Progressing/ Not Met 5/30/2024    Not formally addressed     6. Patient will be educated regarding cognitive deficits as applicable to the patient's life for increased awareness and will execute returned demonstration of information with 80% accuracy.       Goal Met 1/24/2024  Patient and her granddaughter again educated regarding nature of cognitive deficits including a variety of foundational cognitive elements related to R hemisphere dysfunction following CVA including the cognitive triangle (with emphasis on foundational versus higher level cognitive functions, awareness of deficits, impact of attention, information processing, memory, and executive functioning deficits as each area relates  "to assessment findings), spoon theory to discuss cognitive versus physical versus emotional fatigue and management of each to more efficiently use energy daily for more or less cognitively demanding tasks, internal versus external distractions (including emotions, pain, stressors, etc) and management of each, as well as overall impact of these deficits on daily functioning. Patient demonstrated awareness of information provided as demonstrated by asking good questions and expressing understanding. Discussed specifically use of mindfulness and meditation/breathing strategies to improve cognitive functioning related to internal, specifically emotional stressors.       7. Patient will participate in continued cognitive assessment of right hemisphere dysfunction.     Goal Met 2023         GOAL MET 2023   8. NEW GOAL: Patient will complete word finding tasks (VNEST, confrontational naming, picture description, SFA charts) with 90% accuracy given minimal cueing     Progressing/Not Met 2024  Completed X1. Patient generating subjects/objects during VNEST activity with 90% accuracy given minimal-moderate cueing. She expanded sentences with where, when, or why with 80% accuracy given minimal cueing. When reading the sentences, she required cueing to look to the left for the beginning of the sentence. Given cueing, she was able to read sentences with 90% accuracy.    9. NEW GOAL: Patient will utilize word finding strategies during conversation with 90% accuracy given minimal cueing.     Progressing/Not Met 2024  Discussed word finding strategies such as description, example, naming the function. She participated in conversational tasks with clinician, student, and her granddaughter using word finding strategies as needed. She continues to require cueing to utilize strategies. Prompts such as "describe it" were utilized.          Patient Education/Response:   Patient educated regarding the followin. " Alternating attention  2. Importance of follow up with Neurology  3. Word finding strategies    Home program established: Patient instructed to continue prior program  Patient verbalized understanding to all above education provided.     See Electronic Medical Record under Patient Instructions for exercises provided throughout therapy.  Assessment:   Patient is progressing steadily towards goals. She continues to require cueing for more complex tasks (sustained versus alternating attention tasks). She continues to require cueing to use word finding strategies in conversation.     Leslie is progressing well towards her goals. Current goals remain appropriate. Goals to be updated as necessary.     Patient prognosis is Fair. Patient will continue to benefit from skilled outpatient speech and language therapy to address the deficits listed in the problem list on initial evaluation, provide patient/family education and to maximize patient's level of independence in the home and community environment.     Medical necessity is demonstrated by the following IMPAIRMENTS:  Cognition: Deficits in executive functioning, attention, and memory prevent the pt from relaying medically and safety relevant information in a timely manner in a state of emergency.     Barriers to Therapy: NA  Patient's spiritual, cultural and educational needs considered and patient agreeable to plan of care and goals.  Plan:   Continue Plan of Care with focus on rehabilitation and compensation for attention, memory, and executive functioning deficits impacting safety and efficiency of daily task completion.     Becca Blanco, CCC-SLP, CBIS   Speech Language Pathologist   Certified Brain Injury Specialist   5/30/2024

## 2024-05-31 ENCOUNTER — OUTPATIENT CASE MANAGEMENT (OUTPATIENT)
Dept: ADMINISTRATIVE | Facility: OTHER | Age: 84
End: 2024-05-31
Payer: MEDICARE

## 2024-05-31 ENCOUNTER — EXTERNAL CHRONIC CARE MANAGEMENT (OUTPATIENT)
Dept: PRIMARY CARE CLINIC | Facility: CLINIC | Age: 84
End: 2024-05-31
Payer: MEDICARE

## 2024-05-31 PROCEDURE — 99490 CHRNC CARE MGMT STAFF 1ST 20: CPT | Mod: PBBFAC | Performed by: FAMILY MEDICINE

## 2024-05-31 PROCEDURE — 99490 CHRNC CARE MGMT STAFF 1ST 20: CPT | Mod: S$PBB,,, | Performed by: FAMILY MEDICINE

## 2024-05-31 PROCEDURE — 99439 CHRNC CARE MGMT STAF EA ADDL: CPT | Mod: S$PBB,,, | Performed by: FAMILY MEDICINE

## 2024-05-31 PROCEDURE — 99439 CHRNC CARE MGMT STAF EA ADDL: CPT | Mod: PBBFAC | Performed by: FAMILY MEDICINE
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
## 2024-05-31 NOTE — PROGRESS NOTES
Subjective:      Patient ID: Leslie Wood is a 83 y.o. female.    Chief Complaint:  Memory difficulties.     History of Present Illness  HPI 83 Years old C. Female with PMHx of Syncope and Collapsed / Hemiparesis post CVA and others medical issues   came with Family for the evaluation and recommendation of Memory difficulties.      Started: about 5 to 6 months.   Describes: short memory loss.   Timing: constant.   Frequency: daily.   Pain:  3 to 6/ 10.   Location: CNS.   Family: none with Dementia. Possible Stroke.   Medications: Buspar / Cymbalta.   Worsen: night / evening / stress / acute illness.  Alleviated: morning.   Associated symptoms: words findings difficulties / behavioral changes / irritable / visual hallucinations / vivid dreams / Paranoid / falls.  Triggers: post CVA.   Prodrome symptoms: none.          Referral by PCP.       As per Patient about 6 months - started with Memory loss / as per Family about 3 to 4 months.       Stroke 2020 / TIA's 2022.       No confusion at night / no howling /        Possible Seizure x 1 - witnessed by nurse in the Hospital, after back Sx, attributed to MS.          Review of Systems  Review of Systems   Neurological:         Memory difficulties.    All other systems reviewed and are negative.    Objective:     Neurologic Exam     Mental Status   Oriented to person.   Oriented to place. Oriented to country, city and area.   Disoriented to time. Oriented to year, month, date and season.   Registration: recalls 3 of 3 objects. Follows 3 step commands.   Attention: normal. Concentration: normal.   Speech: speech is normal   Level of consciousness: alert  Knowledge: good. Unable to perform simple calculations.   Unable to name object. Able to read. Able to repeat. Able to write. Normal comprehension.     Cranial Nerves     CN II   Visual acuity: decreased  Right visual field deficit: lower temporal quadrant(s)  Left visual field deficit: lower temporal quadrant(s)    CN  III, IV, VI   Pupils are equal, round, and reactive to light.  Extraocular motions are normal.   Upgaze: normal  Downgaze: normal  Conjugate gaze: present  Vestibulo-ocular reflex: present    CN V   Facial sensation intact.     CN VII   Facial expression full, symmetric.     CN VIII   CN VIII normal.     CN IX, X   CN IX normal.   CN X normal.     CN XI   CN XI normal.     CN XII   CN XII normal.     Motor Exam   Muscle bulk: normal  Overall muscle tone: normal    Strength   Strength 5/5 throughout.     Sensory Exam   Right arm light touch: normal  Left arm light touch: decreased from wrist  Right leg light touch: normal  Left leg light touch: decreased from knee  Vibration normal.   Right arm proprioception: normal  Left arm proprioception: decreased from fingers  Right leg proprioception: normal  Left leg proprioception: decreased from ankle  Pinprick normal.   Graphesthesia: abnormal left  Stereognosis: abnormal left    Gait, Coordination, and Reflexes     Gait  Gait: wide-based    Coordination   Romberg: positive  Finger to nose coordination: normal  Heel to shin coordination: abnormal  Tandem walking coordination: abnormal    Tremor   Resting tremor: absent  Intention tremor: absent  Action tremor: absent    Reflexes   Right brachioradialis: 1+  Left brachioradialis: 1+  Right biceps: 1+  Left biceps: 1+  Right triceps: 1+  Left triceps: 1+  Right patellar: 0  Left patellar: 0  Right achilles: 0  Left achilles: 0  Right : 1+  Left : 1+  Right plantar: equivocal  Left plantar: equivocal  Right Lugo: absent  Left Lugo: absent  Right ankle clonus: absent  Left ankle clonus: absent  Right pendular knee jerk: absent  Left pendular knee jerk: absentWalker for ambulation.        Physical Exam  Vitals and nursing note reviewed.   Constitutional:       Appearance: Normal appearance. She is normal weight.   HENT:      Head: Normocephalic and atraumatic.      Nose: Nose normal.      Mouth/Throat:      Mouth:  Mucous membranes are moist.      Pharynx: Oropharynx is clear.   Eyes:      Extraocular Movements: Extraocular movements intact and EOM normal.      Conjunctiva/sclera: Conjunctivae normal.      Pupils: Pupils are equal, round, and reactive to light.   Cardiovascular:      Rate and Rhythm: Normal rate and regular rhythm.      Pulses: Normal pulses.      Heart sounds: Normal heart sounds.   Pulmonary:      Effort: Pulmonary effort is normal.      Breath sounds: Normal breath sounds.   Abdominal:      General: Abdomen is flat. Bowel sounds are normal.      Palpations: Abdomen is soft.   Genitourinary:     Comments: Deferred.   Musculoskeletal:         General: Normal range of motion.      Cervical back: Normal range of motion and neck supple.   Skin:     General: Skin is warm and dry.      Capillary Refill: Capillary refill takes less than 2 seconds.   Neurological:      Mental Status: She is alert.      Motor: Motor strength is normal.     Coordination: Heel to Shin Test abnormal and Romberg Test abnormal. Finger-Nose-Finger Test normal.      Gait: Tandem walk abnormal.      Deep Tendon Reflexes:      Reflex Scores:       Tricep reflexes are 1+ on the right side and 1+ on the left side.       Bicep reflexes are 1+ on the right side and 1+ on the left side.       Brachioradialis reflexes are 1+ on the right side and 1+ on the left side.       Patellar reflexes are 0 on the right side and 0 on the left side.       Achilles reflexes are 0 on the right side and 0 on the left side.     Comments: Short term memory loss / vision deficits / sensory abnormalities.    Psychiatric:         Mood and Affect: Mood normal.         Speech: Speech normal.         Behavior: Behavior normal.         Thought Content: Thought content normal.         Judgment: Judgment normal.          Antalgic   Left hemianopsia.  Crepitus.    MOCA: 15/ 30. Missed 5 words out 5 5 in 5 minutes.     Assessment:   83 Years old C. Female with PMHX as above  came of the evaluation of Memory difficulties.   - Post CVA.  - Moderate Dementia, Vascular.     Plan:   Patient Neurological Assessment is remarkable for short term memory difficulties / left visual filed cut.     CUS.   Neuropsychological Assessment.  Lives with Grand Daughter.   No driving.    She has a SCS - no MRI.   Namenda 5 mg PO BID.    EE - no Seizures.    Labs: CBC / CMP / HIV / Hep C / Lipids panel / Hgb A 1 C / TSH / Troponin -  /  - non significant abnormalities.     Personally reviewed CT Scan Head -  - non significant abnormalities.     Please do not hesitate to contact me with any updates, questions or concerns.    Raghu Ferrera MD.  General Neurologist.    No

## 2024-06-01 NOTE — PROGRESS NOTES
[facility]   Physical Therapy Treatment Note        Name: Leslie CASTELLON [facility] Number: [id number]    Therapy Diagnosis:   Encounter Diagnoses   Name Primary?    Sacroiliitis, not elsewhere classified Yes    Decreased strength, endurance, and mobility        Physician: Marily Millan NP    Visit Date: 5/30/2024    Physician Orders: PT Eval and Treat   Medical Diagnosis from Referral:   Late effect of cerebrovascular accident (CVA)   Hemiplegia and hemiparesis following cerebral infarction affecting left non-dominant side   Gait difficulty   Evaluation Date: 11/16/2023  Authorization Period Expiration: 10/16/2024  Plan of Care Expiration: 6/19/2024  Progress Note Due: 6/19/2024  Visit # / Visits authorized: 11/12 (+eval)  FOTO: 1/3      Precautions: Standard and Fall (1/8/24 - patient reports history of 3 back fusion surgeries)  PTA Visit #: 0/5     Time In: 1045  Time Out: 1145  Total Billable Time: 60 minutes   (Billing reflects 1 on 1 treatment time spent with patient)      Subjective     Patient reports:that she continues to experience a bit of pain at the lower back region    He/She was partially compliant with home exercise program.  Response to previous treatment: some pain relief  Functional change: Some increased speed with walking    Pain: 6/10     Location: lumbar paraspinal region    Objective      Objective Measures updated at progress report or POC update only unless otherwise noted.       Palpation: Trigger points on palpation of the lumbar paraspinal musculature    Treatment     Leslie received the treatments listed below:     THERAPEUTIC EXERCISES to develop strength, endurance, ROM, flexibility, posture, and core stabilization for (15) minutes including:    Intervention Performed Today    Nustep  x  10 minutes, L3   Bicycle   X 8m   Lower trunk rotations x 10 second holds x 10   Pelvic tilts X Anterior/posterior with cueing- 10 second holds x 2 min   Sitting flexion with  "swiss ball  10x   Isometric adduction with ball   3  min   Abdominal brace x With single knee to chest and slowly lowering - 2x10   Supine x Active straight leg raise - 2x10   Long Arc Quads  3# ankle weight - 3x15       Leslie participated in neuromuscular re-education activities to improve: Balance, Coordination, Proprioception, and Posture for 30 minutes. The following activities were included:      Intervention 12/12/2023  Parameters   Sit to stand from 20 inch mat with no upper extremities assist x 10x/ 2 sets hands together   Prone hip extension  10x with cues   Sidelying hip series  2x10 abduction  10x/ 2 sets clamshell   Bridging  10 second holds x 2m   Step Ups  4 inch box   Standing with red theraband X  X  X Hip Extension - 2x10  Hip Abduction - 2x10  Marching - 2x10  Heel Raises - 2x10   Tandem stance  Hand Touches - 2x10   Matrix  X  x Single leg Knee Extension - 2x10 - 15#  Single leg knee flexion - 2x10, 15#        Plan for Next Visit: Progress as indicated      Leslie participated in dynamic functional therapeutic activities to improve functional performance for 0  minutes, including:    Intervention 5/30/2024  Parameters   Walking with 6# ball throw  2 passes   Large forward steps with small lunge  2x10                                                     MANUAL THERAPY TECHNIQUES were applied for (15) minutes, including:    Manual Intervention Performed Today    Soft Tissue Mobilization x  STM bilateral multifidus   Joint Mobilizations x PA glides lumbosacral   Mobilization with movement     Muscle energy techniques   "Shotgun" for + SI provocation   Functional Dry Needling  x FDN performed to the bilateral lumbosacral paraspinal/multifidus     Plan for Next Visit:         Patient Education and Home Exercises       Home Exercises Provided and Patient Education Provided     Education provided: (5) minutes  Moist heat pack to low back prior to exercises at home.    Written Home Exercises Provided: " yes.  Exercises were reviewed and Leslie was able to demonstrate them prior to the end of the session.  Leslie demonstrated fair  understanding of the education provided. See EMR under Patient Instructions for exercises provided during therapy sessions.    Assessment     Patient again reports of some pain at the lower back region.  Patient's granddaughter, Ariane, reports that patient will return to pain management next week.  The patient did receive an injection to target correct region for either rhizotomy or JAMESON.  Good performance and there was again pain relief after performance of FDN.    Leslie is progressing well towards her goals.   Patient prognosis is Fair.     Patient will continue to benefit from skilled outpatient physical therapy to address the deficits listed in the problem list box on initial evaluation, provide pt/family education and to maximize patient's level of independence in the home and community environment.     Patient's spiritual, cultural and educational needs considered and pt agreeable to plan of care and goals.     Anticipated Barriers for therapy: Anxiety or Panic Disorders, Arthritis, Back pain, Depression, Headaches, High  Blood Pressure, Kidney, Bladder, Prostate or Urination Problems, Osteoporosis, Prior Surgery, Stroke or TIA, Visual Impairment      Goals: Reviewed:5/30/2024       Short Term Goals: In 4 weeks   1.Patient to be educated on HEP. - met  2. Patient  to increase lumbar spine ROM to B SB 30 deg, B Rot 75% - PC  3. Patient  to increase hip PROM to 60 deg ER bilaterally, IR 20 deg bilaterally, in order to assist with more normalized gait pattern. - met  4. Patient  to increase B LE and core strength to 4-/5 in order to improve gait and balance.  - PC  5. Patient to increased right knee extn ROM to -5 deg for improved gait mechanics- PC  6. Patient   to have pain less than 2/10 at worst, to improve QOL. - PC  7. Patient  to improve score on the FOTO, to improve QOL.- PC  8.  Patient  to be educated on postural/body mechanics awareness. - PC     Long Term Goals: In 8 weeks  1.Patient to improve score on the FOTO to 48 or better, to improve QOL. - PC  2. Patient to demo increase in LE strength to 4/5, in order to improve endurance and increase ability to ambulate for increased time. - PC  3. Patient to have decreased pain to 2/10 at worst, to improve QOL. - met  4. Patient to demo increase lumbar ROM to ,  SB 30 deg, B Rot 90%in order to improve available range of motion for ADL's. - PC  .  5. Patient  to increase hip PROM to Extn +5 deg,  60 deg ER bilaterally, IR 20 deg bilaterally, in order to assist with more normalized gait pattern. - PC  6. Patient to increased right knee extn ROM to -5 deg for improved gait mechanics - PC  6. Patient to perform daily activities without increased symptoms including:  Prolonged walking x 10 minutes. - met  Ascending.descending 6 inch step without upper extremities assistance - PC  Return to gym and work outings 2 times per week with family transportation - met         Plan     Would like to extend treatment for 2 more months to allow the patient to return to an exercise regimen as she has had limited exercises over the last few weeks with the recent death of the patient's daughter.      Leoncio Lujan, PT

## 2024-06-03 ENCOUNTER — CLINICAL SUPPORT (OUTPATIENT)
Dept: REHABILITATION | Facility: HOSPITAL | Age: 84
End: 2024-06-03
Payer: MEDICARE

## 2024-06-03 DIAGNOSIS — Z74.09 DECREASED STRENGTH, ENDURANCE, AND MOBILITY: ICD-10-CM

## 2024-06-03 DIAGNOSIS — R41.841 COGNITIVE COMMUNICATION DISORDER: Primary | ICD-10-CM

## 2024-06-03 DIAGNOSIS — R53.1 DECREASED STRENGTH, ENDURANCE, AND MOBILITY: ICD-10-CM

## 2024-06-03 DIAGNOSIS — R68.89 DECREASED STRENGTH, ENDURANCE, AND MOBILITY: ICD-10-CM

## 2024-06-03 DIAGNOSIS — M46.1 SACROILIITIS, NOT ELSEWHERE CLASSIFIED: Primary | ICD-10-CM

## 2024-06-03 PROCEDURE — 97110 THERAPEUTIC EXERCISES: CPT

## 2024-06-03 PROCEDURE — 92507 TX SP LANG VOICE COMM INDIV: CPT | Mod: KX

## 2024-06-03 PROCEDURE — 97112 NEUROMUSCULAR REEDUCATION: CPT

## 2024-06-03 PROCEDURE — 97140 MANUAL THERAPY 1/> REGIONS: CPT

## 2024-06-03 NOTE — PROGRESS NOTES
[facility]  Speech Therapy Treatment Note- Neurological Rehabilitation  Date: 6/3/2024     Name: Leslie Wood   MRN: [id number]   Therapy Diagnosis:   Encounter Diagnosis   Name Primary?    Cognitive communication disorder Yes     Physician: Marily Millan NP  Physician Orders: Ambulatory Referral to Speech Therapy   Medical Diagnosis: Disorientation [R41.0], Aphasia [R47.01]     Visit #/ Visits Authorized: 18/20 (+eval, +5)  Date of Evaluation:  11/8/23  Insurance Authorization Period: 11/15/2023 - 12/31/2023   Plan of Care Expiration Date:  6/26/2024  Extended Plan of Care:  N/A  Progress Note: 6/26/2024    Time In:  9:45 AM  Time Out: 10:40 AM  Total Billable Time: 55 mins      Precautions: Standard, Fall, Cognition, and Communication  Subjective:   Patient reports: They have established care with the [facility] and have appointments with Neurology and Case management/social work.   She was compliant to home exercise program. She reports increased anxiety and has started a new medication.     Response to previous treatment: good    Pain Scale: 7/10 on a Visual Analog Scale currently.  Pain Location: lower back  Objective:   TIMED  Procedure Min.   Cognitive Therapeutic Interventions, first 15 minutes CPT 60365  0   Cognitive Therapeutic Interventions, each additional 15 minutes CPT 61047  0     UNTIMED  Procedure Min.   Speech/Language therapy  55               Short Term Goals: (6 weeks) Current Progress:   Patient will sustain attention to a simple task (auditory or visual) for 3 minutes with 90% accuracy or no more than 2 redirections.      Goal Met 12/13/2023   Auditory: met x2  Visual: met x7 Auditory Sustained Attention:  Several sustained auditory attention tasks completed in today's session. Patient listened to multi-step commands and followed with 91% accuracy. She required initial verbal cueing for use of strategies including taking requesting repetition as needed. Improved  use of strategies independently with overall improved accuracy in today's session.    Visual Sustained Attention:  Patient completed several visual sustained attention tasks in today's session in which she visually scanned for letters/numbers in a paragraph.   Trial 1: 1:30, 100% accuracy, 1/7 relative scale of cognitive load   Trial 2: 1:45, 100% accuracy, 1/7 relative scale of cognitive load   Trial 3: 1:30, 100% accuracy, 1/7 relative scale of cognitive load    Trial 4: 2:08, -7 (missed entire row of scanning)   Trial 5: 2:10, 100% accuracy, 1/7 relative scale of cognitive load   Trial 6: 1:30, 100% accuracy, 1/7 relative scale of cognitive load     Overall improved use of strategies to facilitate visual scanning pattern as well as good awareness of errors. Overall high accuracy of completion with low cognitive load.       2. Patient will complete selective attention tasks (auditory or visual) with 85% accuracy independently increase selective attention.                                                GOAL MET 3/18/2024 Auditory Selective Attention:  Patient listened for and followed complex 2 step commands in a dynamic environment with auditory and visual distractions during completion. She followed commands with 92% accuracy and initial verbal cueing for use of strategies. Discussion of how this relates to conversation and asking clarifying questions to facilitate attention and information processing. She rated task as 2/7 on relative scale of cognitive load.    She then listened to a 5 minute video about how basketballs are made. She requested to take notes as needed and paused/rewound video as needed. She answered questions about what she heard with 92% accuracy and rated task as 2/7 on relative scale of cognitive load.     Visual Selective Attention:  Patient participated in visual selective attention task of N-Back (level 1) pictures with natural background noise (therapy door remained opened.   Trial 1:  Patient completed task with 86% accuracy given minimal cueing from clinician  Trial 2: Patient completed task with 88% accuracy independently.     GOAL MET 3/18/2024   3. Patient will use attention shifting strategies to shift attention between two tasks (auditory or visual) with no more than 3 cues or 90% accuracy to improve alternating attention.     Auditory: GOAL MET 4/11/2024  Progressing/ Not Met 6/3/2024  Not formally addressed   4. Patient will recall functional information (name, birthday, address, etc) following delays of 2-3 minutes following a prompt questions across 3 therapy sessions with 80% accuracy (spaced retrieval).      Progressing/ Not Met 6/3/2024   Not formally addressed     5. Patient will complete a functional task to improve attention, memory and/or executive functions (I.e. sample bill paying activity, recipe, or multiple choice comprehension questions to 1 paragraphs) with 80% accuracy and natural environment noise distractions (TV news background, music, etc.).      Progressing/ Not Met 6/3/2024    Not formally addressed     6. Patient will be educated regarding cognitive deficits as applicable to the patient's life for increased awareness and will execute returned demonstration of information with 80% accuracy.       Goal Met 1/24/2024  Patient and her granddaughter again educated regarding nature of cognitive deficits including a variety of foundational cognitive elements related to R hemisphere dysfunction following CVA including the cognitive triangle (with emphasis on foundational versus higher level cognitive functions, awareness of deficits, impact of attention, information processing, memory, and executive functioning deficits as each area relates to assessment findings), spoon theory to discuss cognitive versus physical versus emotional fatigue and management of each to more efficiently use energy daily for more or less cognitively demanding tasks, internal versus external  "distractions (including emotions, pain, stressors, etc) and management of each, as well as overall impact of these deficits on daily functioning. Patient demonstrated awareness of information provided as demonstrated by asking good questions and expressing understanding. Discussed specifically use of mindfulness and meditation/breathing strategies to improve cognitive functioning related to internal, specifically emotional stressors.       7. Patient will participate in continued cognitive assessment of right hemisphere dysfunction.     Goal Met 2023         GOAL MET 2023   8. Patient will complete word finding tasks (VNEST, confrontational naming, picture description, SFA charts) with 90% accuracy given minimal cueing                   Progressing/Not Met 6/3/2024  Completed X2. Patient generating subjects/objects during VNEST activity with 85% accuracy given minimal-moderate cueing. She expanded sentences with where, when, or why with 80% accuracy given minimal cueing. When reading the sentences, she required cueing to look to the left for the beginning of the sentence. Given cueing, she was able to read sentences with 90% accuracy.     Completed X5. Patient requires moderate cueing to determine the category, function, location, and description of objects. Patient and family encouraged to use circumlocution in conversational tasks.    9. Patient will utilize word finding strategies during conversation with 90% accuracy given minimal cueing.       Progressing/Not Met 6/3/2024  Discussed word finding strategies such as description, example, naming the function. She participated in conversational tasks using word finding strategies as needed. She continues to require cueing to utilize strategies. Prompts such as "describe it" were utilized.          Patient Education/Response:   Patient educated regarding the followin. Family/caregiver education regarding prompting during moments of word finding " difficulty   2. Importance of follow up with Neurology  3. Word finding strategies    Home program established: Patient instructed to continue prior program  Patient verbalized understanding to all above education provided.     See Electronic Medical Record under Patient Instructions for exercises provided throughout therapy.  Assessment:   Patient was brought by different caregiver today. Caregiver provided education regarding word finding strategies, prompting during conversational tasks, and the importance of allowing additional time during moments of time during word finding difficulty. Patient is progressing steadily towards goals. She continues to require cueing for more complex tasks (sustained versus alternating attention tasks). She continues to require cueing to use word finding strategies in conversation.     Leslie is progressing well towards her goals. Current goals remain appropriate. Goals to be updated as necessary.     Patient prognosis is Fair. Patient will continue to benefit from skilled outpatient speech and language therapy to address the deficits listed in the problem list on initial evaluation, provide patient/family education and to maximize patient's level of independence in the home and community environment.     Medical necessity is demonstrated by the following IMPAIRMENTS:  Cognition: Deficits in executive functioning, attention, and memory prevent the pt from relaying medically and safety relevant information in a timely manner in a state of emergency.     Barriers to Therapy: NA  Patient's spiritual, cultural and educational needs considered and patient agreeable to plan of care and goals.  Plan:   Continue Plan of Care with focus on rehabilitation and compensation for attention, memory, and executive functioning deficits impacting safety and efficiency of daily task completion.     Becca Blanco, CCC-SLP, CBIS   Speech Language Pathologist   Certified Brain Injury Specialist   6/3/2024

## 2024-06-04 NOTE — PROGRESS NOTES
Outpatient Care Management   - Patient Assessment    Patient: Leslie Wood  MRN:  [id number]  Date of Service:  6/4/2024  Completed by:  Lucy Sim LMSW  Referral Date: 05/17/2024    Reason for Visit   Patient presents with    Other     05/31/2024 : Pt coming out of a procedure. Scheduled call for Monday at 1 PM  06/03/2024 : asked to call earlier by RN. PCG report pt is not with her right now- may need to reschedule 1pm call.  6/3/2024: 1st attempt to complete Initial Assessment  for Outpatient Care Management. Patient did not answer call. Ariane to continue trying to connect sw to patient.  06/03/2024 : spoke with patient , obtained verbal permission to speak with Ariane. Call 9am tomorrow.     Social Work Assessment     06/04/2024         Brief Summary:  [facility] the [facility] SW psychosocial needs; PHQ-9 score 18 and socializing opportunities. [facility] spoke with patient who provided  with  approval  to speak to Ariane. Spoke with pt about PHQ score- pt denied self harm or wanting to harm others. Spoke with patient about her upcoming appointment with  Madina Krishnamurthy LCSW. Patient reported she would be very interested in socializing opportunities. Patient likes to sew and read. Pt is involved with a [employer]. [facility] completed assessment with Ariane. Pt lives with Ariane who assists patient with her care and getting pt to appointments. Pt will on occasion stay with her daughter. Denied any financial needs. However would like some information on Food resources.  Educated on SNAP and medicaid, ariane would like more information. [facility] spoke with Ariane about the [employer].  Per Ariane someone at pt's PCP office has mentioned that before. Denied any other needs or concerns. Care plan was created in collaboration with patient/caregiver input.   completed the SDOH questionnaire.

## 2024-06-05 ENCOUNTER — OFFICE VISIT (OUTPATIENT)
Dept: NEUROLOGY | Facility: CLINIC | Age: 84
End: 2024-06-05
Payer: MEDICARE

## 2024-06-05 VITALS
HEIGHT: 61 IN | BODY MASS INDEX: 25.1 KG/M2 | RESPIRATION RATE: 16 BRPM | HEART RATE: 58 BPM | WEIGHT: 132.94 LBS | OXYGEN SATURATION: 99 % | SYSTOLIC BLOOD PRESSURE: 132 MMHG | DIASTOLIC BLOOD PRESSURE: 64 MMHG

## 2024-06-05 DIAGNOSIS — I65.23 CAROTID STENOSIS, BILATERAL: ICD-10-CM

## 2024-06-05 DIAGNOSIS — F01.B0 MODERATE VASCULAR DEMENTIA, UNSPECIFIED WHETHER BEHAVIORAL, PSYCHOTIC, OR MOOD DISTURBANCE OR ANXIETY: Primary | ICD-10-CM

## 2024-06-05 PROBLEM — F01.B4 MODERATE VASCULAR DEMENTIA WITH ANXIETY: Status: ACTIVE | Noted: 2024-06-05

## 2024-06-05 PROCEDURE — 99999 PR PBB SHADOW E&M-EST. PATIENT-LVL V: CPT | Mod: PBBFAC,,, | Performed by: PSYCHIATRY & NEUROLOGY

## 2024-06-05 PROCEDURE — 99215 OFFICE O/P EST HI 40 MIN: CPT | Mod: PBBFAC | Performed by: PSYCHIATRY & NEUROLOGY

## 2024-06-05 PROCEDURE — 99214 OFFICE O/P EST MOD 30 MIN: CPT | Mod: S$PBB,,, | Performed by: PSYCHIATRY & NEUROLOGY

## 2024-06-05 RX ORDER — MEMANTINE HYDROCHLORIDE 5 MG/1
5 TABLET ORAL 2 TIMES DAILY
Qty: 60 TABLET | Refills: 3 | Status: SHIPPED | OUTPATIENT
Start: 2024-06-05 | End: 2025-06-05

## 2024-06-05 NOTE — PROGRESS NOTES
[facility]   Physical Therapy Treatment Note        Name: Leslie CASTELLON [facility] Number: [id number]    Therapy Diagnosis:   Encounter Diagnoses   Name Primary?    Sacroiliitis, not elsewhere classified Yes    Decreased strength, endurance, and mobility        Physician: Marily Millan NP    Visit Date: 6/3/2024    Physician Orders: PT Eval and Treat   Medical Diagnosis from Referral:   Late effect of cerebrovascular accident (CVA)   Hemiplegia and hemiparesis following cerebral infarction affecting left non-dominant side   Gait difficulty   Evaluation Date: 11/16/2023  Authorization Period Expiration: 10/16/2024  Plan of Care Expiration: 6/19/2024  Progress Note Due: 6/19/2024  Visit # / Visits authorized: 12/18 (+eval)  FOTO: 1/3      Precautions: Standard and Fall (1/8/24 - patient reports history of 3 back fusion surgeries)  PTA Visit #: 0/5     Time In: 1045  Time Out: 1145  Total Billable Time: 60 minutes   (Billing reflects 1 on 1 treatment time spent with patient)      Subjective     Patient reports:that she continues to experience a bit of pain at the lower back region    He/She was partially compliant with home exercise program.  Response to previous treatment: some pain relief  Functional change: Some increased speed with walking    Pain: 6/10     Location: lumbar paraspinal region    Objective      Objective Measures updated at progress report or POC update only unless otherwise noted.       Palpation: Trigger points on palpation of the lumbar paraspinal musculature    Treatment     Leslie received the treatments listed below:     THERAPEUTIC EXERCISES to develop strength, endurance, ROM, flexibility, posture, and core stabilization for (15) minutes including:    Intervention Performed Today    Nustep  x  10 minutes, L3   Bicycle   X 8m   Lower trunk rotations x 10 second holds x 10   Pelvic tilts X Anterior/posterior with cueing- 10 second holds x 2 min   Sitting flexion with  "swiss ball  10x   Isometric adduction with ball   3  min   Abdominal brace x With single knee to chest and slowly lowering - 2x10   Supine x Active straight leg raise - 2x10   Long Arc Quads  3# ankle weight - 3x15       Leslie participated in neuromuscular re-education activities to improve: Balance, Coordination, Proprioception, and Posture for 30 minutes. The following activities were included:      Intervention 12/12/2023  Parameters   Sit to stand from 20 inch mat with no upper extremities assist x 10x/ 2 sets hands together   Prone hip extension  10x with cues   Sidelying hip series  2x10 abduction  10x/ 2 sets clamshell   Bridging  10 second holds x 2m   Step Ups x 4 inch box   Standing with red theraband X  X  X  X  x Hip Extension - 2x10  Hip Abduction - 2x10  Hip Flexion - 2x10  Marching - 2x10  Heel Raises - 2x10   Tandem stance  Hand Touches - 2x10   Matrix  X  x Single leg Knee Extension - 2x10 - 15#  Single leg knee flexion - 2x10, 15#        Plan for Next Visit: Progress as indicated      Leslie participated in dynamic functional therapeutic activities to improve functional performance for 0  minutes, including:    Intervention 6/3/2024  Parameters   Walking with 6# ball throw  2 passes   Large forward steps with small lunge  2x10                                                     MANUAL THERAPY TECHNIQUES were applied for (15) minutes, including:    Manual Intervention Performed Today    Soft Tissue Mobilization x  STM bilateral multifidus   Joint Mobilizations x PA glides lumbosacral   Mobilization with movement     Muscle energy techniques   "Shotgun" for + SI provocation   Functional Dry Needling  x FDN performed to the bilateral lumbosacral paraspinal/multifidus     Plan for Next Visit:         Patient Education and Home Exercises       Home Exercises Provided and Patient Education Provided     Education provided: (5) minutes  Moist heat pack to low back prior to exercises at home.    Written Home " Exercises Provided: yes.  Exercises were reviewed and Leslie was able to demonstrate them prior to the end of the session.  Leslie demonstrated fair  understanding of the education provided. See EMR under Patient Instructions for exercises provided during therapy sessions.    Assessment     Patient reports that she pain at the right knee is decreased.  She does still report of some lower back pain.  Patient is located more at the sacral multfiidus region.  Therefore, FDN performed into the region again.  Did report of pain relief after completion of FDN and exercises.    Leslie is progressing well towards her goals.   Patient prognosis is Fair.     Patient will continue to benefit from skilled outpatient physical therapy to address the deficits listed in the problem list box on initial evaluation, provide pt/family education and to maximize patient's level of independence in the home and community environment.     Patient's spiritual, cultural and educational needs considered and pt agreeable to plan of care and goals.     Anticipated Barriers for therapy: Anxiety or Panic Disorders, Arthritis, Back pain, Depression, Headaches, High  Blood Pressure, Kidney, Bladder, Prostate or Urination Problems, Osteoporosis, Prior Surgery, Stroke or TIA, Visual Impairment      Goals: Reviewed:6/3/2024       Short Term Goals: In 4 weeks   1.Patient to be educated on HEP. - met  2. Patient  to increase lumbar spine ROM to B SB 30 deg, B Rot 75% - PC  3. Patient  to increase hip PROM to 60 deg ER bilaterally, IR 20 deg bilaterally, in order to assist with more normalized gait pattern. - met  4. Patient  to increase B LE and core strength to 4-/5 in order to improve gait and balance.  - PC  5. Patient to increased right knee extn ROM to -5 deg for improved gait mechanics- PC  6. Patient   to have pain less than 2/10 at worst, to improve QOL. - PC  7. Patient  to improve score on the FOTO, to improve QOL.- PC  8. Patient  to be educated on  postural/body mechanics awareness. - PC     Long Term Goals: In 8 weeks  1.Patient to improve score on the FOTO to 48 or better, to improve QOL. - PC  2. Patient to demo increase in LE strength to 4/5, in order to improve endurance and increase ability to ambulate for increased time. - PC  3. Patient to have decreased pain to 2/10 at worst, to improve QOL. - met  4. Patient to demo increase lumbar ROM to ,  SB 30 deg, B Rot 90%in order to improve available range of motion for ADL's. - PC  .  5. Patient  to increase hip PROM to Extn +5 deg,  60 deg ER bilaterally, IR 20 deg bilaterally, in order to assist with more normalized gait pattern. - PC  6. Patient to increased right knee extn ROM to -5 deg for improved gait mechanics - PC  6. Patient to perform daily activities without increased symptoms including:  Prolonged walking x 10 minutes. - met  Ascending.descending 6 inch step without upper extremities assistance - PC  Return to gym and work outings 2 times per week with family transportation - met         Plan     Would like to extend treatment for 2 more months to allow the patient to return to an exercise regimen as she has had limited exercises over the last few weeks with the recent death of the patient's daughter.      Leoncio Lujan, PT

## 2024-06-07 ENCOUNTER — OUTPATIENT CASE MANAGEMENT (OUTPATIENT)
Dept: ADMINISTRATIVE | Facility: OTHER | Age: 84
End: 2024-06-07
Payer: MEDICARE

## 2024-06-07 ENCOUNTER — HOSPITAL ENCOUNTER (OUTPATIENT)
Dept: CARDIOLOGY | Facility: HOSPITAL | Age: 84
Discharge: HOME | End: 2024-06-07
Attending: PSYCHIATRY & NEUROLOGY
Payer: MEDICARE

## 2024-06-07 VITALS
WEIGHT: 132 LBS | DIASTOLIC BLOOD PRESSURE: 70 MMHG | SYSTOLIC BLOOD PRESSURE: 125 MMHG | HEIGHT: 61 IN | BODY MASS INDEX: 24.92 KG/M2

## 2024-06-07 DIAGNOSIS — I65.23 CAROTID STENOSIS, BILATERAL: ICD-10-CM

## 2024-06-07 LAB
LEFT ARM DIASTOLIC BLOOD PRESSURE: 70 MMHG
LEFT ARM SYSTOLIC BLOOD PRESSURE: 125 MMHG
LEFT CBA DIAS: 17 CM/S
LEFT CBA SYS: 76 CM/S
LEFT CCA DIST DIAS: 12 CM/S
LEFT CCA DIST SYS: 78 CM/S
LEFT CCA MID DIAS: 12 CM/S
LEFT CCA MID SYS: 77 CM/S
LEFT CCA PROX DIAS: 16 CM/S
LEFT CCA PROX SYS: 126 CM/S
LEFT ECA DIAS: 5 CM/S
LEFT ECA SYS: 101 CM/S
LEFT ICA DIST DIAS: 25 CM/S
LEFT ICA DIST SYS: 97 CM/S
LEFT ICA MID DIAS: 22 CM/S
LEFT ICA MID SYS: 101 CM/S
LEFT ICA PROX DIAS: 12 CM/S
LEFT ICA PROX SYS: 102 CM/S
LEFT VERTEBRAL DIAS: 12 CM/S
LEFT VERTEBRAL SYS: 61 CM/S
OHS CV CAROTID RIGHT ICA EDV HIGHEST: 24
OHS CV CAROTID ULTRASOUND LEFT ICA/CCA RATIO: 1.31
OHS CV CAROTID ULTRASOUND RIGHT ICA/CCA RATIO: 1.6
OHS CV PV CAROTID LEFT HIGHEST CCA: 126
OHS CV PV CAROTID LEFT HIGHEST ICA: 102
OHS CV PV CAROTID RIGHT HIGHEST CCA: 99
OHS CV PV CAROTID RIGHT HIGHEST ICA: 107
OHS CV US CAROTID LEFT HIGHEST EDV: 25
RIGHT ARM DIASTOLIC BLOOD PRESSURE: 70 MMHG
RIGHT ARM SYSTOLIC BLOOD PRESSURE: 110 MMHG
RIGHT CBA DIAS: 10 CM/S
RIGHT CBA SYS: 59 CM/S
RIGHT CCA DIST DIAS: 11 CM/S
RIGHT CCA DIST SYS: 67 CM/S
RIGHT CCA MID DIAS: 16 CM/S
RIGHT CCA MID SYS: 99 CM/S
RIGHT CCA PROX DIAS: 13 CM/S
RIGHT CCA PROX SYS: 93 CM/S
RIGHT ECA DIAS: 4 CM/S
RIGHT ECA SYS: 76 CM/S
RIGHT ICA DIST DIAS: 24 CM/S
RIGHT ICA DIST SYS: 107 CM/S
RIGHT ICA MID DIAS: 18 CM/S
RIGHT ICA MID SYS: 96 CM/S
RIGHT ICA PROX DIAS: 17 CM/S
RIGHT ICA PROX SYS: 65 CM/S
RIGHT VERTEBRAL DIAS: 9 CM/S
RIGHT VERTEBRAL SYS: 58 CM/S

## 2024-06-07 PROCEDURE — 93880 EXTRACRANIAL BILAT STUDY: CPT | Mod: 26,,, | Performed by: INTERNAL MEDICINE

## 2024-06-07 PROCEDURE — 93880 EXTRACRANIAL BILAT STUDY: CPT

## 2024-06-12 ENCOUNTER — CLINICAL SUPPORT (OUTPATIENT)
Dept: REHABILITATION | Facility: HOSPITAL | Age: 84
End: 2024-06-12
Payer: MEDICARE

## 2024-06-12 DIAGNOSIS — R41.841 COGNITIVE COMMUNICATION DISORDER: Primary | ICD-10-CM

## 2024-06-12 PROCEDURE — 97129 THER IVNTJ 1ST 15 MIN: CPT | Mod: KX

## 2024-06-12 PROCEDURE — 97130 THER IVNTJ EA ADDL 15 MIN: CPT | Mod: KX

## 2024-06-12 NOTE — PROGRESS NOTES
"  [facility]  Speech Therapy Treatment Note- Neurological Rehabilitation  Date: 6/12/2024     Name: Leslie Wood   MRN: [id number]   Therapy Diagnosis:   Encounter Diagnosis   Name Primary?    Cognitive communication disorder Yes     Physician: Marily Millan NP  Physician Orders: Ambulatory Referral to Speech Therapy   Medical Diagnosis: Disorientation [R41.0], Aphasia [R47.01]     Visit #/ Visits Authorized: 19/20 (+eval, +5)  Date of Evaluation:  11/8/23  Insurance Authorization Period: 11/15/2023 - 12/31/2023   Plan of Care Expiration Date:  6/26/2024  Extended Plan of Care:  N/A  Progress Note: 6/26/2024    Time In:  10:45 AM  Time Out: 11:15 AM  Total Billable Time: 30 mins      Precautions: Standard, Fall, Cognition, and Communication  Subjective:   Patient reports: Patient has seen neurology and received diagnosis of vascular dementia. They have established care with the [facility] and have appointment with Case management/social work. She reported that she has been having "flutters" in her chest and plans to make a cardiology appointment.    She was compliant to home exercise program.     Response to previous treatment: good    Pain Scale: 7/10 on a Visual Analog Scale currently.  Pain Location: lower back  Objective:   TIMED  Procedure Min.   Cognitive Therapeutic Interventions, first 15 minutes CPT 05877 15   Cognitive Therapeutic Interventions, each additional 15 minutes CPT 07691  15               Short Term Goals: (6 weeks) Current Progress:   Patient will sustain attention to a simple task (auditory or visual) for 3 minutes with 90% accuracy or no more than 2 redirections.      Goal Met 12/13/2023   Auditory: met x2  Visual: met x7 Auditory Sustained Attention:  Several sustained auditory attention tasks completed in today's session. Patient listened to multi-step commands and followed with 91% accuracy. She required initial verbal cueing for use of strategies including " taking requesting repetition as needed. Improved use of strategies independently with overall improved accuracy in today's session.    Visual Sustained Attention:  Patient completed several visual sustained attention tasks in today's session in which she visually scanned for letters/numbers in a paragraph.   Trial 1: 1:30, 100% accuracy, 1/7 relative scale of cognitive load   Trial 2: 1:45, 100% accuracy, 1/7 relative scale of cognitive load   Trial 3: 1:30, 100% accuracy, 1/7 relative scale of cognitive load    Trial 4: 2:08, -7 (missed entire row of scanning)   Trial 5: 2:10, 100% accuracy, 1/7 relative scale of cognitive load   Trial 6: 1:30, 100% accuracy, 1/7 relative scale of cognitive load     Overall improved use of strategies to facilitate visual scanning pattern as well as good awareness of errors. Overall high accuracy of completion with low cognitive load.       2. Patient will complete selective attention tasks (auditory or visual) with 85% accuracy independently increase selective attention.                                                GOAL MET 3/18/2024 Auditory Selective Attention:  Patient listened for and followed complex 2 step commands in a dynamic environment with auditory and visual distractions during completion. She followed commands with 92% accuracy and initial verbal cueing for use of strategies. Discussion of how this relates to conversation and asking clarifying questions to facilitate attention and information processing. She rated task as 2/7 on relative scale of cognitive load.    She then listened to a 5 minute video about how basketballs are made. She requested to take notes as needed and paused/rewound video as needed. She answered questions about what she heard with 92% accuracy and rated task as 2/7 on relative scale of cognitive load.     Visual Selective Attention:  Patient participated in visual selective attention task of N-Back (level 1) pictures with natural background  noise (therapy door remained opened.   Trial 1: Patient completed task with 86% accuracy given minimal cueing from clinician  Trial 2: Patient completed task with 88% accuracy independently.     GOAL MET 3/18/2024   3. Patient will use attention shifting strategies to shift attention between two tasks (auditory or visual) with no more than 3 cues or 90% accuracy to improve alternating attention.     Auditory: GOAL MET 4/11/2024   Not formally addressed.          4. Patient will recall functional information (name, birthday, address, etc) following delays of 2-3 minutes following a prompt questions across 3 therapy sessions with 80% accuracy (spaced retrieval).      Progressing/ Not Met 6/12/2024   Not formally addressed     5. Patient will complete a functional task to improve attention, memory and/or executive functions (I.e. sample bill paying activity, recipe, or multiple choice comprehension questions to 1 paragraphs) with 80% accuracy and natural environment noise distractions (TV news background, music, etc.).      Progressing/ Not Met 6/12/2024    Patient completed visual alternating task of filling out monthly doctors appointments into a blank calendar. She was required to alternate between the blank calendar and her list of doctors appointments to fill them out into her own blank calendar. She required consistent maximum cueing to complete the task accurately.    6. Patient will be educated regarding cognitive deficits as applicable to the patient's life for increased awareness and will execute returned demonstration of information with 80% accuracy.       Goal Met 1/24/2024  Patient and her granddaughter again educated regarding nature of cognitive deficits including a variety of foundational cognitive elements related to R hemisphere dysfunction following CVA including the cognitive triangle (with emphasis on foundational versus higher level cognitive functions, awareness of deficits, impact of attention,  "information processing, memory, and executive functioning deficits as each area relates to assessment findings), spoon theory to discuss cognitive versus physical versus emotional fatigue and management of each to more efficiently use energy daily for more or less cognitively demanding tasks, internal versus external distractions (including emotions, pain, stressors, etc) and management of each, as well as overall impact of these deficits on daily functioning. Patient demonstrated awareness of information provided as demonstrated by asking good questions and expressing understanding. Discussed specifically use of mindfulness and meditation/breathing strategies to improve cognitive functioning related to internal, specifically emotional stressors.       7. Patient will participate in continued cognitive assessment of right hemisphere dysfunction.     Goal Met 2023         GOAL MET 2023   8. Patient will complete word finding tasks (VNEST, confrontational naming, picture description, SFA charts) with 90% accuracy given minimal cueing      Progressing/Not Met 2024  Not formally addressed.    9. Patient will utilize word finding strategies during conversation with 90% accuracy given minimal cueing.       Progressing/Not Met 2024  She participated in conversational tasks using word finding strategies as needed. She continues to require cueing to utilize strategies. Prompts such as "describe it" were utilized.          Patient Education/Response:   Patient educated regarding the followin. Importance of establishing care with social work  2. Scanning to her left visual field   3. Word finding strategies    Home program established: Patient instructed to continue prior program  Patient verbalized understanding to all above education provided.     See Electronic Medical Record under Patient Instructions for exercises provided throughout therapy.  Assessment:   Patient was brought by her " granddaughter today. Caregiver and clinician discussed new vascular dementia diagnosis and word finding strategies. Patient is progressing steadily towards goals. She continues to require cueing for more complex tasks (sustained versus alternating attention tasks). She continues to require cueing to use word finding strategies in conversation.     Leslie is progressing well towards her goals. Current goals remain appropriate. Goals to be updated as necessary.     Patient prognosis is Fair. Patient will continue to benefit from skilled outpatient speech and language therapy to address the deficits listed in the problem list on initial evaluation, provide patient/family education and to maximize patient's level of independence in the home and community environment.     Medical necessity is demonstrated by the following IMPAIRMENTS:  Cognition: Deficits in executive functioning, attention, and memory prevent the pt from relaying medically and safety relevant information in a timely manner in a state of emergency.     Barriers to Therapy: NA  Patient's spiritual, cultural and educational needs considered and patient agreeable to plan of care and goals.  Plan:   Continue Plan of Care with focus on rehabilitation and compensation for attention, memory, and executive functioning deficits impacting safety and efficiency of daily task completion.       LUPE Tyson    Clinician   6/12/2024      Becca Blanco, CCC-SLP, CBIS   Speech Language Pathologist   Certified Brain Injury Specialist   6/12/2024

## 2024-06-14 ENCOUNTER — OUTPATIENT CASE MANAGEMENT (OUTPATIENT)
Dept: ADMINISTRATIVE | Facility: OTHER | Age: 84
End: 2024-06-14
Payer: MEDICARE

## 2024-06-14 ENCOUNTER — TELEPHONE (OUTPATIENT)
Dept: PRIMARY CARE CLINIC | Facility: CLINIC | Age: 84
End: 2024-06-14
Payer: MEDICARE

## 2024-06-14 NOTE — TELEPHONE ENCOUNTER
Spoke to daughter, instructed to drop buspar 5 mg to once daily at night. Try that for a week. If still dizzy just stop it. We will follow up on 6/26  Daughter voiced understanding and took note.

## 2024-06-14 NOTE — TELEPHONE ENCOUNTER
----- Message from Monique Rowe RN sent at 6/14/2024 12:01 PM CDT -----    ----- Message -----  From: Zuri Olivas RN  Sent: 6/14/2024  11:57 AM CDT  To: Jason Fisher Staff    Dr Gomez/Staff,       Mrs Wood's grand daughter, Ariane, reports Mrs Wood has been complaining of weakness and dizziness since starting Buspar 5 mg BID. She reports Mrs Wood is also on duloxetine 20 mg daily that is prescribed by her pain provider, Dr Telles at [facility] as part of her pain management regimen. I noticed it is not on her med list so needed to make you aware as well as advise of the above symptoms she is reporting.     Thank you,     Zuri Olivas RN, [facility]  Outpatient Case Management  [phone]  Ext [phone]  [email or web address]

## 2024-06-17 ENCOUNTER — TELEPHONE (OUTPATIENT)
Dept: PRIMARY CARE CLINIC | Facility: CLINIC | Age: 84
End: 2024-06-17
Payer: MEDICARE

## 2024-06-17 ENCOUNTER — OUTPATIENT CASE MANAGEMENT (OUTPATIENT)
Dept: ADMINISTRATIVE | Facility: OTHER | Age: 84
End: 2024-06-17
Payer: MEDICARE

## 2024-06-17 NOTE — TELEPHONE ENCOUNTER
----- Message from Natalia Gomez MD sent at 6/14/2024 12:52 PM CDT -----  Please tell pt to reduce buspar to nightly only. Try that a week, if still dizzy, discontinue. Thanks  ----- Message -----  From: Monique Rowe RN  Sent: 6/14/2024  12:01 PM CDT  To: Natalia Gomez MD      ----- Message -----  From: Zuri Olivas RN  Sent: 6/14/2024  11:57 AM CDT  To: Jason Fisher Staff    Dr Gomez/Staff,       Mrs Wood's grand daughter, Ariane, reports Mrs Wood has been complaining of weakness and dizziness since starting Buspar 5 mg BID. She reports Mrs Wood is also on duloxetine 20 mg daily that is prescribed by her pain provider, Dr Telles at the [facility] as part of her pain management regimen. I noticed it is not on her med list so needed to make you aware as well as advise of the above symptoms she is reporting.     Thank you,     Zuri Olivas RN, [facility]  Outpatient Case Management  [phone]  Ext [phone]  [email or web address]

## 2024-06-18 ENCOUNTER — CLINICAL SUPPORT (OUTPATIENT)
Dept: PRIMARY CARE CLINIC | Facility: CLINIC | Age: 84
End: 2024-06-18
Payer: MEDICARE

## 2024-06-18 DIAGNOSIS — Z78.9 IMPAIRED MOBILITY AND ADLS: ICD-10-CM

## 2024-06-18 DIAGNOSIS — Z74.09 IMPAIRED MOBILITY AND ADLS: ICD-10-CM

## 2024-06-18 PROCEDURE — 99499 UNLISTED E&M SERVICE: CPT | Mod: S$PBB,,,

## 2024-06-18 NOTE — PROGRESS NOTES
"[facility] BEHAVIORAL HEALTH INTAKE    DATE:  2024  REFERRAL SOURCE:  Natalia Gomez MD  TYPE OF VISIT:  In person  LENGTH OF SESSION: 60  .  HISTORY OF PRESENTING ILLNESS:  Leslie Wood, a 83 y.o. female with history of MAJOR DEPRESSIVE DISORDER, SINGLE EPISODE, Unspecified (F32.9).    CHIEF COMPLAINT/REASON FOR ENCOUNTER: Pt presented for initial assessment. Pt's chief complaint includes the following: depression and anxiety.  Wants to "learn how to manage anxiety." Grief.     PSYCHIATRIC HISTORY:  Patient does not currently have a psychiatrist.    Patient does not currently have a therapist.     Pt is taking buspirone (Buspar) 0.5mg BID for Anxiety. Pt is taking duloxetine (Cymbalta) 30mg once daily for Depression. They are possibly interested in medication changes.  Previous Psychiatric Hospitalizations:  Yes - In the 's, suicidal thoughts and depression  History of Trauma:  No  History of Violence:  No  Access to a gun/weapon:  No  Previous Suicide Attempts:  No    Risk assessment:  Patient reports no suicidal ideation  Patient reports no homicidal ideation  Patient reports no self-injurious behavior  Patient reports no violent behavior    PSYCHIATRIC FAMILY HISTORY: none Patient denies.    SUBSTANCE ABUSE HISTORY:  Tobacco:  No   Alcohol: none  Illicit Substances: No  Misuse of Prescription Medications:  No    MEDICAL HISTORY:  Past Medical History:   Diagnosis Date    Arthritis     Cataract     GERD (gastroesophageal reflux disease)     Heart attack 2022    Hypertension     Stroke          SOCIAL HISTORY (MARRIAGE, EMPLOYMENT, etc.):  Living Situation: Currently living at Ariane avendaño's house. Would prefer to be in her own home.   Relationship status  since .  62 years.   Children/Family: Spouse () Elbrenda. Dtr () Mirta. Mirta's kids: Ariane and Luis E.  Dtr, Mis; Mis's 6 kids. Son in law, Serge.   Supports:Granddaughter, Ariane. Daughter, Mis. " "  Education/Vocation: H.S. degree, some nursing. 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  Sikhism/Spirituality: Sabianism  Hobbies and Interests: Reading, Sewing.     Current social stressors:   Loss of daughter, Mirta, in February 2024. Unexpected death. "Worked herself to death."   Loss of spouse, Mendel, in 2020, from Covid. Limited contact during illness.  Closure of business, [employer]. Family stress over the business.   Staying with granddtr, Ariane - would prefer to be in her own home again.   Continued deficits as result of 2 strokes.     Current symptoms:  Depression: anhedonia, hopelessness, and fatigue.  Anxiety: excessive worrying and restlessness.  Insomnia: non-restful sleep and oversleeping at times .  Justine:  denies.  Psychosis: denies .    MENTAL HEALTH STATUS EXAM  General Appearance:  unremarkable, age appropriate   Speech: normal tone, normal pitch, normal volume, slowed      Level of Cooperation: cooperative      Thought Processes: normal and logical   Mood: sad      Thought Content: suicidal thoughts: (passive-Yes)   Affect: congruent and appropriate   Orientation: Oriented x3   Memory: recent >  impaired, remote >  intact   Attention Span & Concentration: intact   Fund of General Knowledge: intact and appropriate to age and level of education   Judgment & Insight: good   Language  intact     Session Content/Presenting Problem Hx:    Needs BRAYDEN completed at next visit.    Met with both the patient and her granddaughter, Ariane at the beginning of the visit, then with patient alone. The entire family is dealing with the loss of Ariane's mom, Mirta, in February 2024. Her death has affected the entire family both emotionally and financially. Mirta was the person person running the family business since Leslie's jail a few years ago. She was the only one other than Leslie that was a certified fitter. Mirta and her , Serge, lived "across the driveway" from [place]. Mirta was able to " "be there for Leslie.  Mirta's death was sudden and unexpected. Serge is currently living in Leslie's house.  Leslie is happy to have someone in her home and she trusts and relies on Serge.  Her other dtr, Mis, does not want Serge in Leslie's home. Mis also wants to manage what is happening with the business. Mis now has Leslie's POA. Ariane had been the POA previously; Leslie did not want to upset Mis by refusing her. According to Leslie, she and Mis do not have a great relationship.  There is tension between Mis and Ariane because of this.   Patient describes a long happy marriage to her , Mendel. They both became sick in  with Covid and were hospitalized. They were  for a month at that time. Protocols were strict regarding visitors. She was allowed to "sneak in" to see him one night. He  the next day.  The situation was very upsetting for her and the entire family. Since then, Leslie has had ongoing health issues including the strokes.   She has been retired from the business for about 4 years now.  She has trouble with word finding, and some memory loss. She has had a few falls. She is aware of this and finds it upsetting. She would prefer to be back at work running her business. She is grateful to have Ariane but she would prefer to be back in her own home. She and Mendel lived in the home since . Ariane does not believe Leslie will be safe at home.   Patient says she often sleeps too much. Other days she has trouble sleeping at night. Keeps irregular hours.   Leslie has 2 siblings still living. Her sister, Carmen, is 93; she lives alone in a senior living apartment. Her younger brother Jeffrey is in his own home; he has been  for about a year. There were originally 8 siblings total.   Patient is grieving the loss of her spouse. She is also grieving the loss of her daughter, Mirta. She is frustrated by the deficits of the strokes. She worries about her business and wishes she could continue to run " the business. She is not happy living in Ariane's house although she is grateful to Ariane for all of her support. Ariane is grieving the loss of her mom.. Ariane is emotionally upset by her Aunt Mis for taking over as POA, and for making decisions about the business.       STRENGTHS AND LIABILITIES: Strength: Patient has positive support network., Strength: Patient has reasonable judgment., Strength: Patient is stable.    IMPRESSION:   My diagnostic impression is Anxiety disorders; anxiety, unspecified [F41.9] and uncomp bereavement, as evidenced by feeling nervous, on edge. Worrying too much. Recent loss of spouse and daughter, and closure of her business.     TREATMENT GOALS: Anxiety: reducing negative automatic thoughts, reducing physical symptoms of anxiety, and reducing time spent worrying (<30 minutes/day)    PLAN: In this session a psychosocial evaluation was conducted to get history and determine a treatment plan. Interpersonal Processing Therapy  will be utilized in future individual  therapy sessions to increase interaction, insight, and support.     NEXT APPOINTMENT: June 24th

## 2024-06-24 ENCOUNTER — CLINICAL SUPPORT (OUTPATIENT)
Dept: REHABILITATION | Facility: HOSPITAL | Age: 84
End: 2024-06-24
Payer: MEDICARE

## 2024-06-24 DIAGNOSIS — R41.841 COGNITIVE COMMUNICATION DISORDER: Primary | ICD-10-CM

## 2024-06-24 PROCEDURE — 97130 THER IVNTJ EA ADDL 15 MIN: CPT | Mod: KX

## 2024-06-24 PROCEDURE — 97129 THER IVNTJ 1ST 15 MIN: CPT | Mod: KX

## 2024-06-25 PROBLEM — R41.841 COGNITIVE COMMUNICATION DISORDER: Status: RESOLVED | Noted: 2023-02-27 | Resolved: 2024-06-25

## 2024-06-25 NOTE — PLAN OF CARE
Outpatient Therapy Discharge Summary   Discharge Date: 6/24/2024   Name: Leslie Wood  [facility] Number: [id number]  Therapy Diagnosis:   Encounter Diagnosis   Name Primary?    Cognitive communication disorder Yes     Physician: Marily Millan NP  Physician Orders: Ambulatory Referral to Speech Therapy   Medical Diagnosis: Disorientation [R41.0], Aphasia [R47.01]   Evaluation Date: 11/8/2023    Date of Last visit: 6/24/2024  Total Visits Received: 25 +eval    Assessment    Assessment of Current Status:   Patient has participated in several months of outpatient speech therapy. She consistently demonstrates motivation for improvement and participates in all therapy tasks. She has demonstrated fluctuating cognitive-communication abilities with an overall progressive decline. She was encouraged to re-establish care with Neurology given decline in function. She received a new diagnosis of vascular dementia which aligns with complaints of decline in cognitive-communication function as well as plateau in therapy progress. Patient, her granddaughter, and clinician discussed progressive nature of the disease, the importance of implementing compensatory strategies, and the importance of establishing care with a . They verbalized understanding of all discussed.   Clinician provided patient with extensive HEP including handouts regarding cognitive-communication deficits, aphasia, and dementia, handouts regarding compensatory strategies, and attention tasks including visual scanning and alternating attention tasks.      Goals:                           Short Term Goals: (6 weeks)   Patient will sustain attention to a simple task (auditory or visual) for 3 minutes with 90% accuracy or no more than 2 redirections.      Goal Met 12/13/2023   Auditory: met x2  Visual: met x7   2. Patient will complete selective attention tasks (auditory or visual) with 85% accuracy independently increase selective attention.         GOAL MET 3/18/2024   3. Patient will use attention shifting strategies to shift attention between two tasks (auditory or visual) with no more than 3 cues or 90% accuracy to improve alternating attention.         Auditory: GOAL MET 4/11/2024   4. Patient will recall functional information (name, birthday, address, etc) following delays of 2-3 minutes following a prompt questions across 3 therapy sessions with 80% accuracy (spaced retrieval).      Progressing/ Not Met 6/24/2024     5. Patient will complete a functional task to improve attention, memory and/or executive functions (I.e. sample bill paying activity, recipe, or multiple choice comprehension questions to 1 paragraphs) with 80% accuracy and natural environment noise distractions (TV news background, music, etc.).      Progressing/ Not Met 6/24/2024      6. Patient will be educated regarding cognitive deficits as applicable to the patient's life for increased awareness and will execute returned demonstration of information with 80% accuracy.       Goal Met 1/24/2024    7. Patient will participate in continued cognitive assessment of right hemisphere dysfunction.      Goal Met 11/24/2023     8. Patient will complete word finding tasks (VNEST, confrontational naming, picture description, SFA charts) with 90% accuracy given minimal cueing        Progressing/Not Met 6/25/2024    9. Patient will utilize word finding strategies during conversation with 90% accuracy given minimal cueing.         Progressing/Not Met 6/25/2024           Long Term Goals: (12 weeks)   Patient will improve attention skills to effectively attend to and communicate in simple daily living tasks in functional living environment.            Discharge reason: Patient has reached the maximum rehab potential for the present time    Plan   This patient is discharged from Speech Therapy.     Becca Blanco CCC-SLP   6/24/2024

## 2024-06-25 NOTE — PROGRESS NOTES
[facility]  Speech Therapy Treatment Note- Neurological Rehabilitation  Date: 6/24/2024     Name: Leslie Wood   MRN: [id number]   Therapy Diagnosis:   Encounter Diagnosis   Name Primary?    Cognitive communication disorder Yes     Physician: Mraily Millan NP  Physician Orders: Ambulatory Referral to Speech Therapy   Medical Diagnosis: Disorientation [R41.0], Aphasia [R47.01]     Visit #/ Visits Authorized: 20/20 (+eval, +5)  Date of Evaluation:  11/8/23  Insurance Authorization Period: 11/15/2023 - 12/31/2023   Plan of Care Expiration Date:  6/26/2024  Extended Plan of Care:  N/A  Progress Note: 6/26/2024 - SEE DISCHARGE NOTE    Time In:  10:35 AM  Time Out: 11:15 AM  Total Billable Time: 40 mins      Precautions: Standard, Fall, Cognition, and Communication  Subjective:   Patient reports: no significant changes since last session. She is established with the 65[facility], with a , and with Neurology.    She was compliant to home exercise program.     Response to previous treatment: good    Pain Scale: 7/10 on a Visual Analog Scale currently.  Pain Location: lower back  Objective:   TIMED  Procedure Min.   Cognitive Therapeutic Interventions, first 15 minutes CPT 07945 15   Cognitive Therapeutic Interventions, each additional 15 minutes CPT 24107  25               Short Term Goals: (6 weeks) Current Progress:   Patient will sustain attention to a simple task (auditory or visual) for 3 minutes with 90% accuracy or no more than 2 redirections.      Goal Met 12/13/2023   Auditory: met x2  Visual: met x7 Auditory Sustained Attention:  Several sustained auditory attention tasks completed in today's session. Patient listened to multi-step commands and followed with 91% accuracy. She required initial verbal cueing for use of strategies including taking requesting repetition as needed. Improved use of strategies independently with overall improved accuracy in today's session.    Visual  Sustained Attention:  Patient completed several visual sustained attention tasks in today's session in which she visually scanned for letters/numbers in a paragraph.   Trial 1: 1:30, 100% accuracy, 1/7 relative scale of cognitive load   Trial 2: 1:45, 100% accuracy, 1/7 relative scale of cognitive load   Trial 3: 1:30, 100% accuracy, 1/7 relative scale of cognitive load    Trial 4: 2:08, -7 (missed entire row of scanning)   Trial 5: 2:10, 100% accuracy, 1/7 relative scale of cognitive load   Trial 6: 1:30, 100% accuracy, 1/7 relative scale of cognitive load     Overall improved use of strategies to facilitate visual scanning pattern as well as good awareness of errors. Overall high accuracy of completion with low cognitive load.       2. Patient will complete selective attention tasks (auditory or visual) with 85% accuracy independently increase selective attention.                                                GOAL MET 3/18/2024 Auditory Selective Attention:  Patient listened for and followed complex 2 step commands in a dynamic environment with auditory and visual distractions during completion. She followed commands with 92% accuracy and initial verbal cueing for use of strategies. Discussion of how this relates to conversation and asking clarifying questions to facilitate attention and information processing. She rated task as 2/7 on relative scale of cognitive load.    She then listened to a 5 minute video about how basketballs are made. She requested to take notes as needed and paused/rewound video as needed. She answered questions about what she heard with 92% accuracy and rated task as 2/7 on relative scale of cognitive load.     Visual Selective Attention:  Patient participated in visual selective attention task of N-Back (level 1) pictures with natural background noise (therapy door remained opened.   Trial 1: Patient completed task with 86% accuracy given minimal cueing from clinician  Trial 2: Patient  completed task with 88% accuracy independently.     GOAL MET 3/18/2024   3. Patient will use attention shifting strategies to shift attention between two tasks (auditory or visual) with no more than 3 cues or 90% accuracy to improve alternating attention.       Auditory: GOAL MET 4/11/2024 Patient completed alternating attention task of reviewing a phone bill for errors. She was provided area codes that were expected and compared a phone bill to those area codes. She completed this task with 100% accuracy given consistent moderate cueing.          4. Patient will recall functional information (name, birthday, address, etc) following delays of 2-3 minutes following a prompt questions across 3 therapy sessions with 80% accuracy (spaced retrieval).      Progressing/ Not Met 6/24/2024   Patient able recall functional information with 90% accuracy independently.    5. Patient will complete a functional task to improve attention, memory and/or executive functions (I.e. sample bill paying activity, recipe, or multiple choice comprehension questions to 1 paragraphs) with 80% accuracy and natural environment noise distractions (TV news background, music, etc.).      Progressing/ Not Met 6/24/2024    Patient completed task of reviewing a phone bill for errors. She was provided area codes that were expected and compared a phone bill to those area codes. She completed this task with 100% accuracy given consistent moderate cueing.    6. Patient will be educated regarding cognitive deficits as applicable to the patient's life for increased awareness and will execute returned demonstration of information with 80% accuracy.       Goal Met 1/24/2024  Patient and her granddaughter again educated regarding nature of cognitive deficits including a variety of foundational cognitive elements related to R hemisphere dysfunction following CVA including the cognitive triangle (with emphasis on foundational versus higher level cognitive  "functions, awareness of deficits, impact of attention, information processing, memory, and executive functioning deficits as each area relates to assessment findings), spoon theory to discuss cognitive versus physical versus emotional fatigue and management of each to more efficiently use energy daily for more or less cognitively demanding tasks, internal versus external distractions (including emotions, pain, stressors, etc) and management of each, as well as overall impact of these deficits on daily functioning. Patient demonstrated awareness of information provided as demonstrated by asking good questions and expressing understanding. Discussed specifically use of mindfulness and meditation/breathing strategies to improve cognitive functioning related to internal, specifically emotional stressors.    7. Patient will participate in continued cognitive assessment of right hemisphere dysfunction.     Goal Met 11/24/2023         GOAL MET 11/24/2023   8. Patient will complete word finding tasks (VNEST, confrontational naming, picture description, SFA charts) with 90% accuracy given minimal cueing      Progressing/Not Met 6/25/2024  Patient completed SFA charts X5 with 90% accuracy given moderate cueing.    9. Patient will utilize word finding strategies during conversation with 90% accuracy given minimal cueing.       Progressing/Not Met 6/25/2024  She participated in conversational tasks using word finding strategies as needed. She continues to require cueing to utilize strategies. Prompts such as "describe it" were utilized.          Patient Education/Response:   Patient, her granddaughter, and clinician discussed discharge from speech therapy due to plateau in progress. This was expected given her new diagnosis of vascular dementia. Decline in function is expected given degenerative nature of the disease. Patient and her granddaughter were encouraged to use compensatory strategies as needed and were provided a " comprehensive list of strategies for orientation, attention, and memory. They verbalized understanding.     Home program established: Patient provided extensive HEP including handouts regarding cognitive deficits, aphasia, and dementia, handouts regarding compensatory strategies, and attention tasks involving visual scanning and alternating attention.   Patient verbalized understanding to all above education provided.     See Electronic Medical Record under Patient Instructions for exercises provided throughout therapy.  Assessment:   Patient was brought by her granddaughter today. Discussed d/c from . They were encourage to call with any questions/concerns and return to therapy with any significant changes. See discharge note.     Leslie is progressing well towards her goals. Current goals remain appropriate. Goals to be updated as necessary.     Patient prognosis is Fair.     Medical necessity is demonstrated by the following IMPAIRMENTS:  Cognition: Deficits in executive functioning, attention, and memory prevent the pt from relaying medically and safety relevant information in a timely manner in a state of emergency.     Barriers to Therapy: NA  Patient's spiritual, cultural and educational needs considered and patient agreeable to plan of care and goals.  Plan:   Discharge from . See discharge note.     Becca Blanco, CCC-SLP, CBIS   Speech Language Pathologist   Certified Brain Injury Specialist   6/25/2024

## 2024-06-26 ENCOUNTER — OUTPATIENT CASE MANAGEMENT (OUTPATIENT)
Dept: ADMINISTRATIVE | Facility: OTHER | Age: 84
End: 2024-06-26
Payer: MEDICARE

## 2024-07-03 ENCOUNTER — OFFICE VISIT (OUTPATIENT)
Dept: PRIMARY CARE CLINIC | Facility: CLINIC | Age: 84
End: 2024-07-03
Payer: MEDICARE

## 2024-07-03 ENCOUNTER — OUTPATIENT CASE MANAGEMENT (OUTPATIENT)
Dept: ADMINISTRATIVE | Facility: OTHER | Age: 84
End: 2024-07-03
Payer: MEDICARE

## 2024-07-03 VITALS — HEART RATE: 59 BPM | OXYGEN SATURATION: 98 % | DIASTOLIC BLOOD PRESSURE: 58 MMHG | SYSTOLIC BLOOD PRESSURE: 132 MMHG

## 2024-07-03 DIAGNOSIS — Z74.09 IMPAIRED MOBILITY AND ADLS: ICD-10-CM

## 2024-07-03 DIAGNOSIS — F01.B4 MODERATE VASCULAR DEMENTIA WITH ANXIETY: ICD-10-CM

## 2024-07-03 DIAGNOSIS — Z71.89 ACP (ADVANCE CARE PLANNING): ICD-10-CM

## 2024-07-03 DIAGNOSIS — Z78.9 IMPAIRED MOBILITY AND ADLS: ICD-10-CM

## 2024-07-03 DIAGNOSIS — F32.A ANXIETY AND DEPRESSION: ICD-10-CM

## 2024-07-03 DIAGNOSIS — F41.9 ANXIETY AND DEPRESSION: ICD-10-CM

## 2024-07-03 DIAGNOSIS — R68.89 FEELING UNWELL: Primary | ICD-10-CM

## 2024-07-03 PROBLEM — M81.8 OTHER OSTEOPOROSIS WITHOUT CURRENT PATHOLOGICAL FRACTURE: Status: RESOLVED | Noted: 2018-07-30 | Resolved: 2024-07-03

## 2024-07-03 PROBLEM — E55.9 VITAMIN D DEFICIENCY: Status: RESOLVED | Noted: 2019-06-24 | Resolved: 2024-07-03

## 2024-07-03 PROCEDURE — G2211 COMPLEX E/M VISIT ADD ON: HCPCS | Mod: S$PBB,,, | Performed by: FAMILY MEDICINE

## 2024-07-03 PROCEDURE — 99999 PR PBB SHADOW E&M-EST. PATIENT-LVL IV: CPT | Mod: PBBFAC,,, | Performed by: FAMILY MEDICINE

## 2024-07-03 PROCEDURE — 99214 OFFICE O/P EST MOD 30 MIN: CPT | Mod: S$PBB,,, | Performed by: FAMILY MEDICINE

## 2024-07-03 PROCEDURE — 99214 OFFICE O/P EST MOD 30 MIN: CPT | Mod: PBBFAC,PN | Performed by: FAMILY MEDICINE

## 2024-07-03 PROCEDURE — 93005 ELECTROCARDIOGRAM TRACING: CPT | Mod: PBBFAC,PN | Performed by: STUDENT IN AN ORGANIZED HEALTH CARE EDUCATION/TRAINING PROGRAM

## 2024-07-03 PROCEDURE — 93010 ELECTROCARDIOGRAM REPORT: CPT | Mod: S$PBB,,, | Performed by: STUDENT IN AN ORGANIZED HEALTH CARE EDUCATION/TRAINING PROGRAM

## 2024-07-03 NOTE — PROGRESS NOTES
Patient Care Team:  Natalia Gomez MD as PCP - General (Family Medicine)  Elva Israel MD as Consulting Physician (Pain Medicine)  Danile Bonilla MD (Ophthalmology)  Viki Ruiz PA-C as Physician Assistant (Internal Medicine)  Urvashi Valle as ED Navigator  Program, Medicare Shared Savings as Hypertension Digital Medicine Contract  Mckayla Eisenberg MD as Hypertension Digital Medicine Responsible Provider (Family Medicine)  Norma Graff, PharmD as Hypertension Digital Medicine Clinician (Pharmacist)  Zuri Olivas RN as Outpatient   Lucy Sim LMSW as Outpatient Case Management Social Worker  Natalia Gomez MD  Future Appointments   Date Time Provider Department Center   7/25/2024 10:00 AM Luis Ibarra MD ON ORTHO [facility]   9/3/2024  9:40 AM Domingo Martins MD ON CARDIO [facility]   9/6/2024  9:00 AM Raghu Felix MD [facility] [place]   10/3/2024 10:20 AM Natalia Gomez MD [id number]       Subjective:      Patient ID: Leslie Wood is a 84 y.o. female.    Chief Complaint: Back Pain, Follow-up, and Fatigue      HPI  1mo follow up. Felling unwell:   Reports feeling unwell this morning, sudden blank mind, doesn't klnow where she was, that got her very concerned and anxious  Gdtr Ariane reports pt stopped her morning grooming routine, just stopped everything, and late for appt  Buspar added last visit for anxiety - reports dizziness, dropped dose to qd from bid. No more dizziness  Neurologist started her on memantine bid a week after buspar started. Gdtr Ariane wondering which caused her agitation late evenings?    Discuss hcpoa  Advance Care Planning     Date: 07/03/2024    Power of   I initiated the process of voluntary advance care planning today and explained the importance of this process to the patient.  I introduced the concept of advance directives to the patient, as well. Then the patient received  detailed information about the importance of designating a Health Care Power of  (HCPOA). She was also instructed to communicate with this person about their wishes for future healthcare, should she become sick and lose decision-making capacity. The patient has not previously appointed a HCPOA. After our discussion, the patient has decided to complete a HCPOA and has appointed her  granddaughter Ariane , health care agent:  see file  & health care agent number:  see file . I encouraged her to communicate with this person about their wishes for future healthcare, should she become sick and lose decision-making capacity.      A total of 5 min was spent on advance care planning, goals of care discussion, emotional support, formulating and communicating prognosis and exploring burden/benefit of various approaches of treatment. This discussion occurred on a fully voluntary basis with the verbal consent of the patient and/or family.          Review of Systems  PHQ-4 Score: 9   Last worry score 2    PMHx, active ongoing problems, labs and medications reviewed    Objective:   BP (!) 132/58   Pulse (!) 59   SpO2 98%     Physical Exam  Vitals and nursing note reviewed.   Constitutional:       General: She is not in acute distress.  HENT:      Head: Normocephalic and atraumatic.   Cardiovascular:      Rate and Rhythm: Normal rate and regular rhythm.      Heart sounds: No murmur heard.  Pulmonary:      Effort: Pulmonary effort is normal.      Breath sounds: Normal breath sounds.   Musculoskeletal:      Right lower leg: No edema.      Left lower leg: No edema.   Skin:     General: Skin is warm.      Capillary Refill: Capillary refill takes less than 2 seconds.   Neurological:      Mental Status: She is alert. Mental status is at baseline.      Gait: Gait abnormal (unsteady, uses walker).         Assessment and Plan     1. Feeling unwell  Comments:  EKG wet read similar to previous a few mo ago  Orders:  -     IN OFFICE  EKG 12-LEAD (to [facility])    2. Moderate vascular dementia with anxiety  Comments:  + sundowning. new on nemantine neurology following.  Overview:  Neurology clinic      3. Impaired mobility and ADLs  Comments:  from [facility]. in PT    4. ACP (advance care planning)    5. Anxiety and depression  Comments:  acute anxious episode this am, due to memory lapse  continue low dose buspar along with duolexetine        Discharge instructions     The following issues were discussed: The primary encounter diagnosis was Feeling unwell. Diagnoses of Moderate vascular dementia with anxiety, Impaired mobility and ADLs, ACP (advance care planning), and Anxiety and depression were also pertinent to this visit.

## 2024-07-03 NOTE — PATIENT INSTRUCTIONS
If you are feeling unwell, we'd like to be the first ones to know here at [facility]! Please give us a call. Same day appointments are our top priority to keep you well and out of the emergency rooms and hospitals. Call [phone] for our direct line. After hours advice is always available. Please call [phone] after hours to speak to the on-call team.

## 2024-07-04 LAB
OHS QRS DURATION: 76 MS
OHS QTC CALCULATION: 404 MS

## 2024-07-05 DIAGNOSIS — R94.31 ABNORMAL EKG: Primary | ICD-10-CM

## 2024-07-05 DIAGNOSIS — R42 DIZZINESS: ICD-10-CM

## 2024-07-05 DIAGNOSIS — R68.89 FEELING UNWELL: ICD-10-CM

## 2024-07-11 ENCOUNTER — OUTPATIENT CASE MANAGEMENT (OUTPATIENT)
Dept: ADMINISTRATIVE | Facility: OTHER | Age: 84
End: 2024-07-11
Payer: MEDICARE

## 2024-07-11 NOTE — PROGRESS NOTES
07/11/2024  [facility] SW filed EPS report due to concerns expressed and reported by pt's granddaughter. In May 2024  patient was taken to  a  were POA was signed over to dtr , pt expressed to granddaughter that she was unaware of what she was signing. Pt also reported to family that she was taken to the bank and her money was moved to a new account opened by dtr. 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 Reported history of previous incidents where pt was taken advantage of by dtr but details not provide details to .

## 2024-07-15 ENCOUNTER — TELEPHONE (OUTPATIENT)
Dept: PRIMARY CARE CLINIC | Facility: CLINIC | Age: 84
End: 2024-07-15
Payer: MEDICARE

## 2024-07-20 ENCOUNTER — HOSPITAL ENCOUNTER (EMERGENCY)
Facility: HOSPITAL | Age: 84
Discharge: HOME | End: 2024-07-20
Attending: EMERGENCY MEDICINE
Payer: MEDICARE

## 2024-07-20 VITALS
HEIGHT: 61 IN | RESPIRATION RATE: 18 BRPM | BODY MASS INDEX: 23.3 KG/M2 | OXYGEN SATURATION: 100 % | HEART RATE: 80 BPM | SYSTOLIC BLOOD PRESSURE: 155 MMHG | WEIGHT: 123.44 LBS | TEMPERATURE: 97 F | DIASTOLIC BLOOD PRESSURE: 71 MMHG

## 2024-07-20 DIAGNOSIS — K21.9 GASTROESOPHAGEAL REFLUX DISEASE WITHOUT ESOPHAGITIS: Primary | ICD-10-CM

## 2024-07-20 DIAGNOSIS — R07.9 CHEST PAIN: ICD-10-CM

## 2024-07-20 LAB
ALBUMIN SERPL BCP-MCNC: 3.8 G/DL (ref 3.5–5.2)
ALP SERPL-CCNC: 83 U/L (ref 55–135)
ALT SERPL W/O P-5'-P-CCNC: 19 U/L (ref 10–44)
ANION GAP SERPL CALC-SCNC: 12 MMOL/L (ref 8–16)
AST SERPL-CCNC: 24 U/L (ref 10–40)
BASOPHILS # BLD AUTO: 0.05 K/UL (ref 0–0.2)
BASOPHILS NFR BLD: 0.6 % (ref 0–1.9)
BILIRUB SERPL-MCNC: 0.9 MG/DL (ref 0.1–1)
BNP SERPL-MCNC: 34 PG/ML (ref 0–99)
BUN SERPL-MCNC: 33 MG/DL (ref 8–23)
CALCIUM SERPL-MCNC: 10.1 MG/DL (ref 8.7–10.5)
CHLORIDE SERPL-SCNC: 108 MMOL/L (ref 95–110)
CO2 SERPL-SCNC: 23 MMOL/L (ref 23–29)
CREAT SERPL-MCNC: 1 MG/DL (ref 0.5–1.4)
DIFFERENTIAL METHOD BLD: NORMAL
EOSINOPHIL # BLD AUTO: 0.2 K/UL (ref 0–0.5)
EOSINOPHIL NFR BLD: 1.9 % (ref 0–8)
ERYTHROCYTE [DISTWIDTH] IN BLOOD BY AUTOMATED COUNT: 13.9 % (ref 11.5–14.5)
EST. GFR  (NO RACE VARIABLE): 55 ML/MIN/1.73 M^2
GLUCOSE SERPL-MCNC: 103 MG/DL (ref 70–110)
HCT VFR BLD AUTO: 43.2 % (ref 37–48.5)
HGB BLD-MCNC: 14.5 G/DL (ref 12–16)
IMM GRANULOCYTES # BLD AUTO: 0.02 K/UL (ref 0–0.04)
IMM GRANULOCYTES NFR BLD AUTO: 0.3 % (ref 0–0.5)
LYMPHOCYTES # BLD AUTO: 2.6 K/UL (ref 1–4.8)
LYMPHOCYTES NFR BLD: 32.1 % (ref 18–48)
MCH RBC QN AUTO: 30.4 PG (ref 27–31)
MCHC RBC AUTO-ENTMCNC: 33.6 G/DL (ref 32–36)
MCV RBC AUTO: 91 FL (ref 82–98)
MONOCYTES # BLD AUTO: 0.9 K/UL (ref 0.3–1)
MONOCYTES NFR BLD: 11.2 % (ref 4–15)
NEUTROPHILS # BLD AUTO: 4.3 K/UL (ref 1.8–7.7)
NEUTROPHILS NFR BLD: 53.9 % (ref 38–73)
NRBC BLD-RTO: 0 /100 WBC
PLATELET # BLD AUTO: 224 K/UL (ref 150–450)
PMV BLD AUTO: 10.2 FL (ref 9.2–12.9)
POTASSIUM SERPL-SCNC: 4 MMOL/L (ref 3.5–5.1)
PROT SERPL-MCNC: 7 G/DL (ref 6–8.4)
RBC # BLD AUTO: 4.77 M/UL (ref 4–5.4)
SODIUM SERPL-SCNC: 143 MMOL/L (ref 136–145)
TROPONIN I SERPL DL<=0.01 NG/ML-MCNC: <0.006 NG/ML (ref 0–0.03)
TROPONIN I SERPL DL<=0.01 NG/ML-MCNC: <0.006 NG/ML (ref 0–0.03)
WBC # BLD AUTO: 7.95 K/UL (ref 3.9–12.7)

## 2024-07-20 PROCEDURE — 25000003 PHARM REV CODE 250: Performed by: EMERGENCY MEDICINE

## 2024-07-20 PROCEDURE — 84484 ASSAY OF TROPONIN QUANT: CPT | Mod: 91 | Performed by: EMERGENCY MEDICINE

## 2024-07-20 PROCEDURE — 80053 COMPREHEN METABOLIC PANEL: CPT | Performed by: EMERGENCY MEDICINE

## 2024-07-20 PROCEDURE — 93005 ELECTROCARDIOGRAM TRACING: CPT

## 2024-07-20 PROCEDURE — 85025 COMPLETE CBC W/AUTO DIFF WBC: CPT | Performed by: EMERGENCY MEDICINE

## 2024-07-20 PROCEDURE — 99285 EMERGENCY DEPT VISIT HI MDM: CPT | Mod: 25

## 2024-07-20 PROCEDURE — 96374 THER/PROPH/DIAG INJ IV PUSH: CPT

## 2024-07-20 PROCEDURE — 93010 ELECTROCARDIOGRAM REPORT: CPT | Mod: ,,, | Performed by: INTERNAL MEDICINE

## 2024-07-20 PROCEDURE — 83880 ASSAY OF NATRIURETIC PEPTIDE: CPT | Performed by: EMERGENCY MEDICINE

## 2024-07-20 RX ORDER — METOPROLOL TARTRATE 25 MG/1
25 TABLET, FILM COATED ORAL
Status: COMPLETED | OUTPATIENT
Start: 2024-07-20 | End: 2024-07-20

## 2024-07-20 RX ORDER — ALUMINUM HYDROXIDE, MAGNESIUM HYDROXIDE, AND SIMETHICONE 1200; 120; 1200 MG/30ML; MG/30ML; MG/30ML
15 SUSPENSION ORAL
Status: COMPLETED | OUTPATIENT
Start: 2024-07-20 | End: 2024-07-20

## 2024-07-20 RX ORDER — FAMOTIDINE 10 MG/ML
20 INJECTION INTRAVENOUS
Status: COMPLETED | OUTPATIENT
Start: 2024-07-20 | End: 2024-07-20

## 2024-07-20 RX ORDER — LOSARTAN POTASSIUM 50 MG/1
50 TABLET ORAL DAILY
Status: DISCONTINUED | OUTPATIENT
Start: 2024-07-20 | End: 2024-07-20 | Stop reason: HOSPADM

## 2024-07-20 RX ADMIN — NITROGLYCERIN 1 INCH: 20 OINTMENT TOPICAL at 08:07

## 2024-07-20 RX ADMIN — METOPROLOL TARTRATE 25 MG: 25 TABLET, FILM COATED ORAL at 07:07

## 2024-07-20 RX ADMIN — ALUMINUM HYDROXIDE, MAGNESIUM HYDROXIDE, AND SIMETHICONE 15 ML: 1200; 120; 1200 SUSPENSION ORAL at 07:07

## 2024-07-20 RX ADMIN — FAMOTIDINE 20 MG: 10 INJECTION, SOLUTION INTRAVENOUS at 07:07

## 2024-07-20 RX ADMIN — LOSARTAN POTASSIUM 50 MG: 50 TABLET, FILM COATED ORAL at 08:07

## 2024-07-20 NOTE — DISCHARGE INSTRUCTIONS
Your symptoms appear to be related more to acid reflux in the any heart issues.  Use Pepcid or Tums for symptoms.  Follow up with the cardiologist this week for re-evaluation and consideration of stress test.  Return as needed

## 2024-07-20 NOTE — ED PROVIDER NOTES
"SCRIBE #1 NOTE: I, Rony Scherer, am scribing for, and in the presence of, Jarett Lerner Jr., MD. I have scribed the entire note.       History     Chief Complaint   Patient presents with    Chest Pain     Pt arrived via [facility], report is she was awake and and started having chest pain that she describes as pressure     Review of patient's allergies indicates:   Allergen Reactions    Amitriptyline     Hydrochlorothiazide      Causes muscle cramping    Lisinopril      hyperkalemia    Opioids - morphine analogues Hallucinations     Chest pain and hallucinations    Oxycodone     Percocet [oxycodone-acetaminophen] Other (See Comments)     Seizures    Belbuca [buprenorphine hcl] Nausea And Vomiting and Other (See Comments)     Black out     Codeine Nausea Only and Rash    Prazosin Other (See Comments)     dizziness         History of Present Illness     HPI    7/20/2024, 7:25 AM  History obtained from the patient      History of Present Illness: Leslie Wood is a 84 y.o. female patient with a PMHx of HTN, cataract, GERD, stroke, and MI who presents to the Emergency Department for evaluation of chest pain which onset suddenly after waking up this morning. Pt reports the pain woke her up. Pt describes the CP as an "aching" pain.  She would points to her epigastric areas source of the pain demonstrated that it radiates up into her chest.  She does admit to some water brash.  Symptoms are constant and moderate in severity. No mitigating or exacerbating factors reported. Associated sxs include SOB that started while enroute with [facility] and tingling in both leg. Patient denies any chills, nausea, dysuria, HA, and all other sxs at this time. No prior Tx. Pt denies smoking. Pt denies having DM or heart issues. Pt has HTN. No further complaints or concerns at this time.       Arrival mode: [facility]    PCP: Natalia Gomez MD        Past Medical History:  Past Medical History:   Diagnosis Date    Arthritis     Cataract     GERD " (gastroesophageal reflux disease)     Heart attack 05/23/2022    Hypertension     Stroke        Past Surgical History:  Past Surgical History:   Procedure Laterality Date    ADENOIDECTOMY      ADRENAL GLAND SURGERY      APPENDECTOMY      BACK SURGERY      fusion l 4-5 s 1,2,3  fusion l 2-3    CORONARY ANGIOGRAPHY N/A 05/20/2022    Procedure: ANGIOGRAM, CORONARY ARTERY;  Surgeon: Domingo Martins MD;  Location: [facility] CATH LAB;  Service: Cardiology;  Laterality: N/A;    CORONARY ANGIOGRAPHY N/A 05/24/2022    Procedure: ANGIOGRAM, CORONARY ARTERY;  Surgeon: Domingo Martins MD;  Location: [facility] CATH LAB;  Service: Cardiology;  Laterality: N/A;    EYE SURGERY      HEMORRHOID SURGERY      HERNIA REPAIR      HYSTERECTOMY      partial    indirect lumbar decompression      percutaneous placement of interspinous extension blocker    LEFT HEART CATHETERIZATION Left 05/20/2022    Procedure: CATHETERIZATION, HEART, LEFT;  Surgeon: Domingo Martins MD;  Location: [facility] CATH LAB;  Service: Cardiology;  Laterality: Left;    TONSILLECTOMY           Family History:  Family History   Problem Relation Name Age of Onset    Heart disease Mother      Hypertension Mother      Diabetes Mother      Hypertension Father      Kidney disease Father      Breast cancer Sister         Social History:  Social History     Tobacco Use    Smoking status: Never    Smokeless tobacco: Never   Substance and Sexual Activity    Alcohol use: No    Drug use: No    Sexual activity: Not Currently        Review of Systems     Review of Systems   Constitutional:  Negative for chills and fever.   HENT:  Negative for sore throat.    Respiratory:  Positive for shortness of breath. Negative for cough.    Cardiovascular:  Positive for chest pain (aching).   Gastrointestinal:  Negative for abdominal pain, nausea and vomiting.   Genitourinary:  Negative for dysuria.   Musculoskeletal:  Positive for myalgias (tingling in legs). Negative for back pain.   Skin:  Negative  "for rash.   Neurological:  Negative for weakness, light-headedness and headaches.   Hematological:  Does not bruise/bleed easily.   All other systems reviewed and are negative.     Physical Exam     Initial Vitals [07/20/24 0659]   BP Pulse Resp Temp SpO2   (!) 181/75 63 18 97.5 °F (36.4 °C) 100 %      MAP       --          Physical Exam  Nursing Notes and Vital Signs Reviewed.  Constitutional: Patient is in no acute distress. Well-developed and well-nourished.  Head: Atraumatic. Normocephalic.  Eyes:  EOM intact.  No scleral icterus.  ENT: Mucous membranes are moist.  Nares clear   Neck:  Full ROM. No JVD.  Cardiovascular: Regular rate. Regular rhythm No murmurs, rubs, or gallops. Distal pulses are 2+ and symmetric  Pulmonary/Chest: No respiratory distress. Clear to auscultation bilaterally. No wheezing or rales.  Equal chest wall rise bilaterally  Abdominal: Soft and non-distended.  T mild left upper quadrant tenderness.  No guarding or rebound. Good bowel sounds.  No palpable pulsatile abdominal mass.  Negative Hudson's  Genitourinary: No CVA tenderness.  No suprapubic tenderness  Musculoskeletal: Moves all extremities. No obvious deformities.  5 x 5 strength in all extremities   Skin: Warm and dry.  Neurological:  Alert, awake, and appropriate.  Normal speech.  No acute focal neurological deficits are appreciated.  Two through 12 intact bilaterally.  Psychiatric: Normal affect. Good eye contact. Appropriate in content.          ED Course   Procedures  ED Vital Signs:  Vitals:    07/20/24 0659 07/20/24 0711 07/20/24 0730 07/20/24 0753   BP: (!) 181/75 (!) 190/107 (!) 197/88 (!) 159/72   Pulse: 63 60 60 (!) 59   Resp: 18 18 18 18   Temp: 97.5 °F (36.4 °C)      TempSrc: Oral      SpO2: 100% 98% 98% 97%   Weight:  56 kg (123 lb 7.3 oz)     Height: 5' 1" (1.549 m)       07/20/24 0802 07/20/24 0822 07/20/24 0833 07/20/24 0847   BP: (!) 163/70 (!) 153/69 (!) 152/67 (!) 150/67   Pulse: (!) 56 (!) 55 (!) 52 (!) 50 "   Resp: 18 18 18 18   Temp:       TempSrc:       SpO2: 97% 97% 97% 97%   Weight:       Height:        07/20/24 0902 07/20/24 0930 07/20/24 0950 07/20/24 1100   BP: (!) 152/68 (!) 149/70 (!) 144/65 (!) 118/58   Pulse: (!) 50 (!) 53 (!) 50 (!) 58   Resp: 18 18 20 18   Temp:       TempSrc:       SpO2: 97% 98% 97% 97%   Weight:       Height:        07/20/24 1150   BP: (!) 155/71   Pulse: 80   Resp: 18   Temp: 97.3 °F (36.3 °C)   TempSrc: Oral   SpO2: 100%   Weight:    Height:        Abnormal Lab Results:  Labs Reviewed   COMPREHENSIVE METABOLIC PANEL - Abnormal       Result Value    Sodium 143      Potassium 4.0      Chloride 108      CO2 23      Glucose 103      BUN 33 (*)     Creatinine 1.0      Calcium 10.1      Total Protein 7.0      Albumin 3.8      Total Bilirubin 0.9      Alkaline Phosphatase 83      AST 24      ALT 19      eGFR 55 (*)     Anion Gap 12     CBC W/ AUTO DIFFERENTIAL    WBC 7.95      RBC 4.77      Hemoglobin 14.5      Hematocrit 43.2      MCV 91      MCH 30.4      MCHC 33.6      RDW 13.9      Platelets 224      MPV 10.2      Immature Granulocytes 0.3      Gran # (ANC) 4.3      Immature Grans (Abs) 0.02      Lymph # 2.6      Mono # 0.9      Eos # 0.2      Baso # 0.05      nRBC 0      Gran % 53.9      Lymph % 32.1      Mono % 11.2      Eosinophil % 1.9      Basophil % 0.6      Differential Method Automated     TROPONIN I    Troponin I <0.006     B-TYPE NATRIURETIC PEPTIDE    BNP 34     TROPONIN I    Troponin I <0.006          All Lab Results:  Results for orders placed or performed during the hospital encounter of 07/20/24   CBC auto differential   Result Value Ref Range    WBC 7.95 3.90 - 12.70 K/uL    RBC 4.77 4.00 - 5.40 M/uL    Hemoglobin 14.5 12.0 - 16.0 g/dL    Hematocrit 43.2 37.0 - 48.5 %    MCV 91 82 - 98 fL    MCH 30.4 27.0 - 31.0 pg    MCHC 33.6 32.0 - 36.0 g/dL    RDW 13.9 11.5 - 14.5 %    Platelets 224 150 - 450 K/uL    MPV 10.2 9.2 - 12.9 fL    Immature Granulocytes 0.3 0.0 - 0.5 %     Gran # (ANC) 4.3 1.8 - 7.7 K/uL    Immature Grans (Abs) 0.02 0.00 - 0.04 K/uL    Lymph # 2.6 1.0 - 4.8 K/uL    Mono # 0.9 0.3 - 1.0 K/uL    Eos # 0.2 0.0 - 0.5 K/uL    Baso # 0.05 0.00 - 0.20 K/uL    nRBC 0 0 /100 WBC    Gran % 53.9 38.0 - 73.0 %    Lymph % 32.1 18.0 - 48.0 %    Mono % 11.2 4.0 - 15.0 %    Eosinophil % 1.9 0.0 - 8.0 %    Basophil % 0.6 0.0 - 1.9 %    Differential Method Automated    Comprehensive metabolic panel   Result Value Ref Range    Sodium 143 136 - 145 mmol/L    Potassium 4.0 3.5 - 5.1 mmol/L    Chloride 108 95 - 110 mmol/L    CO2 23 23 - 29 mmol/L    Glucose 103 70 - 110 mg/dL    BUN 33 (H) 8 - 23 mg/dL    Creatinine 1.0 0.5 - 1.4 mg/dL    Calcium 10.1 8.7 - 10.5 mg/dL    Total Protein 7.0 6.0 - 8.4 g/dL    Albumin 3.8 3.5 - 5.2 g/dL    Total Bilirubin 0.9 0.1 - 1.0 mg/dL    Alkaline Phosphatase 83 55 - 135 U/L    AST 24 10 - 40 U/L    ALT 19 10 - 44 U/L    eGFR 55 (A) >60 mL/min/1.73 m^2    Anion Gap 12 8 - 16 mmol/L   Troponin I #1   Result Value Ref Range    Troponin I <0.006 0.000 - 0.026 ng/mL   BNP   Result Value Ref Range    BNP 34 0 - 99 pg/mL   Troponin I #2   Result Value Ref Range    Troponin I <0.006 0.000 - 0.026 ng/mL        Imaging Results:  Imaging Results              X-Ray Chest AP Portable (Final result)  Result time 07/20/24 07:36:04      Final result by BHARATI Neff Sr., MD (07/20/24 07:36:04)                   Impression:      There has been no detrimental interval change in appearance..      Electronically signed by: Luis Neff MD  Date:    07/20/2024  Time:    07:36               Narrative:    EXAMINATION:  XR CHEST AP PORTABLE    CLINICAL HISTORY:  Chest Pain;    COMPARISON:  04/16/2024    FINDINGS:  The size of the heart is normal.  There is no evidence of an acute pulmonary process.  There is no pneumothorax.  The costophrenic angles are sharp.  There are 2 electrodes projected over the thoracic spine.                                       The EKG was  ordered, reviewed, and independently interpreted by the ED provider.  Interpretation time: 7:03  Rate: 62 BPM  Rhythm: normal sinus rhythm  Interpretation: Septal infarct, age undetermined. No STEMI.           The Emergency Provider reviewed the vital signs and test results, which are outlined above.     ED Discussion     11:30 AM: Reassessed pt at this time. Discussed with pt all pertinent ED information and results. Discussed pt dx and plan of tx. Gave pt all f/u and return to the ED instructions. All questions and concerns were addressed at this time. Pt expresses understanding of information and instructions, and is comfortable with plan to discharge. Pt is stable for discharge.    I discussed with patient and/or family/caretaker that evaluation in the ED does not suggest any emergent or life threatening medical conditions requiring immediate intervention beyond what was provided in the ED, and I believe patient is safe for discharge.  Regardless, an unremarkable evaluation in the ED does not preclude the development or presence of a serious of life threatening condition. As such, patient was instructed to return immediately for any worsening or change in current symptoms.     ED Course as of 07/20/24 1216   Sat Jul 20, 2024   0755 Cardiac monitor interpretation  Independent interpretation  Indication: Chest pain  Sinus bradycardia.  Rate 57.  No STEMI [RT]      ED Course User Index  [RT] Jarett Lerner Jr., MD     Medical Decision Making  Differential diagnosis:  ASCVD, cardiac ischemia, chest pain, acid reflux    Patient was evaluated with the history and physical examination.  Patient was x2 negative cardiac enzymes with the epigastric pain radiating to her chest that resolved with GI medications.  She was no STEMI on her EKG.  There is no palpable pulsatile abdominal mass there is no radiation of the pain to her back.  It was no weakness.  He was no indication for CT imaging at this time.  Patient was  clinically does not have a dissection.  Clinically appears to have acid reflux will treat with Pepcid and Tums with close follow-up.  I have advised her to see her cardiologist this week for re-evaluation possible stress testing.  Patient is reliable verbalized agreement understanding with all instructions    Amount and/or Complexity of Data Reviewed  Labs: ordered. Decision-making details documented in ED Course.     Details: X2 negative troponins.  CMP is negative.  Normal LFTs.  CBC shows normal white count normal H&H  Radiology: ordered. Decision-making details documented in ED Course.     Details: No wide mediastinum.  No acute findings  ECG/medicine tests: ordered and independent interpretation performed. Decision-making details documented in ED Course.     Details: No STEMI    Risk  OTC drugs.  Prescription drug management.  Decision regarding hospitalization.       Additional MDM:   Heart Score:    History:          Slightly suspicious.  ECG:             Normal  Age:               >65 years  Risk factors: 1-2 risk factors  Troponin:       Less than or equal to normal limit  Heart Score = 3                ED Medication(s):  Medications   losartan tablet 50 mg (50 mg Oral Given 7/20/24 0802)   famotidine (PF) injection 20 mg (20 mg Intravenous Given 7/20/24 0721)   aluminum-magnesium hydroxide-simethicone 200-200-20 mg/5 mL suspension 15 mL (15 mLs Oral Given 7/20/24 0720)   nitroGLYCERIN 2% TD oint ointment 1 inch (1 inch Topical (Top) Given 7/20/24 0834)   metoprolol tartrate (LOPRESSOR) tablet 25 mg (25 mg Oral Given 7/20/24 0753)       Discharge Medication List as of 7/20/2024 11:29 AM           Follow-up Information       Natalia Gomez MD.    Specialty: Family Medicine  Contact information:  [id number]  [place] [address]  [phone]                                 Scribe Attestation:   Scribe #1: I performed the above scribed service and the documentation accurately describes the services  I performed. I attest to the accuracy of the note.     Attending:   Physician Attestation Statement for Scribe #1: I, Jarett Lerner Jr., MD, personally performed the services described in this documentation, as scribed by Rony Scherer, in my presence, and it is both accurate and complete.           Clinical Impression       ICD-10-CM ICD-9-CM   1. Gastroesophageal reflux disease without esophagitis  K21.9 530.81   2. Chest pain  R07.9 786.50       Disposition:   Disposition: Discharged  Condition: Stable        Jarett Lerner Jr., MD  07/20/24 1216

## 2024-07-21 LAB
OHS QRS DURATION: 70 MS
OHS QTC CALCULATION: 410 MS

## 2024-07-22 ENCOUNTER — OUTPATIENT CASE MANAGEMENT (OUTPATIENT)
Dept: ADMINISTRATIVE | Facility: OTHER | Age: 84
End: 2024-07-22
Payer: MEDICARE

## 2024-07-22 ENCOUNTER — DOCUMENTATION ONLY (OUTPATIENT)
Dept: PRIMARY CARE CLINIC | Facility: CLINIC | Age: 84
End: 2024-07-22
Payer: MEDICARE

## 2024-07-24 ENCOUNTER — OUTPATIENT CASE MANAGEMENT (OUTPATIENT)
Dept: ADMINISTRATIVE | Facility: OTHER | Age: 84
End: 2024-07-24
Payer: MEDICARE

## 2024-07-25 ENCOUNTER — TELEPHONE (OUTPATIENT)
Dept: ORTHOPEDICS | Facility: CLINIC | Age: 84
End: 2024-07-25

## 2024-07-25 ENCOUNTER — OFFICE VISIT (OUTPATIENT)
Dept: ORTHOPEDICS | Facility: CLINIC | Age: 84
End: 2024-07-25
Payer: MEDICARE

## 2024-07-25 VITALS — WEIGHT: 123.44 LBS | HEIGHT: 61 IN | BODY MASS INDEX: 23.3 KG/M2

## 2024-07-25 DIAGNOSIS — M17.11 ARTHRITIS OF KNEE, RIGHT: ICD-10-CM

## 2024-07-25 DIAGNOSIS — M17.11 PRIMARY OSTEOARTHRITIS OF RIGHT KNEE: Primary | ICD-10-CM

## 2024-07-25 DIAGNOSIS — Z98.1 HISTORY OF LUMBAR FUSION: ICD-10-CM

## 2024-07-25 DIAGNOSIS — M17.11 ARTHRITIS OF KNEE, RIGHT: Primary | ICD-10-CM

## 2024-07-25 DIAGNOSIS — M23.51 RECURRENT RIGHT KNEE INSTABILITY: ICD-10-CM

## 2024-07-25 PROCEDURE — 99999PBSHW PR PBB SHADOW TECHNICAL ONLY FILED TO HB: Mod: PBBFAC,,,

## 2024-07-25 PROCEDURE — 20610 DRAIN/INJ JOINT/BURSA W/O US: CPT | Mod: PBBFAC | Performed by: ORTHOPAEDIC SURGERY

## 2024-07-25 PROCEDURE — 99999 PR PBB SHADOW E&M-EST. PATIENT-LVL III: CPT | Mod: PBBFAC,,, | Performed by: ORTHOPAEDIC SURGERY

## 2024-07-25 PROCEDURE — 99213 OFFICE O/P EST LOW 20 MIN: CPT | Mod: PBBFAC | Performed by: ORTHOPAEDIC SURGERY

## 2024-07-25 RX ORDER — KETOROLAC TROMETHAMINE 30 MG/ML
60 INJECTION, SOLUTION INTRAMUSCULAR; INTRAVENOUS
Status: DISCONTINUED | OUTPATIENT
Start: 2024-07-25 | End: 2024-07-25 | Stop reason: HOSPADM

## 2024-07-25 RX ADMIN — KETOROLAC TROMETHAMINE 60 MG: 30 INJECTION, SOLUTION INTRAMUSCULAR; INTRAVENOUS at 10:07

## 2024-07-25 NOTE — PROCEDURES
Large Joint Aspiration/Injection: R knee    Date/Time: 7/25/2024 10:00 AM    Performed by: Luis Ibarra MD  Authorized by: Luis Ibarra MD    Consent Done?:  Yes (Verbal)  Indications:  Arthritis  Site marked: the procedure site was marked    Timeout: prior to procedure the correct patient, procedure, and site was verified      Local anesthesia used?: Yes    Local anesthetic:  Lidocaine 1% without epinephrine    Details:  Needle Size:  22 G  Ultrasonic Guidance for needle placement?: No    Approach:  Anterolateral  Location:  Knee  Site:  R knee  Medications:  32 mg triamcinolone acetonide 32 mg; 60 mg ketorolac 30 mg/mL (1 mL)  Patient tolerance:  Patient tolerated the procedure well with no immediate complications

## 2024-07-25 NOTE — PROGRESS NOTES
Subjective:     Patient ID: Leslie Wood is a 84 y.o. female.    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  She had a CT scan which I reviewed of her head that showed that she had a stroke and we do not think that she is a candidate for total knee replacement due to medical conditions.  I did tell him anesthesia might bring her level of function to 1 level lower than when she was preop and we should avoid it at this time.  She needs to recuperate completely from her stroke.  Her neurologist at told what ever she gets after 1 year in of therapy is there is what she is going to get.  She is going to physical therapy at the [facility] and they want something about her knee if they can strengthen that.  She does have a brace for her back that she wears but not for her knee.  She uses a walker to get around.  She is here with her granddaughter and we discussed natural products.  She knows she cannot take NSAIDs because she is on Plavix and will injure her stomach and history of GERD the.  She needs to take natural with stuff.  In the future I did tell her steroid might be the only option for her in may end up being prednisone pills when time comes in the future   She denies loss of bowel bladder control she is alert today and oriented and answering questions appropriately  Granddaughter is here with her helping.      05/19/2023   Severe right knee pain   Scratched by a dog on the left calf that is painful   Patient stated that the right knee Depo-Medrol injection given 03/24/2023 wore out 2 weeks ago.  She is willing to proceed with viscosupplementation since we are avoiding having surgery at all cost.  She stated the dog scratched her and ran into her and caused some bruising and worried about an area that seems  to be hard and inflamed.  She is using her Rollator to get around.  She is not a surgical candidate due to medical issues.  She can not take NSAIDs she is on Plavix and history of GERD   She is here with granddaughter very pleasant alert oriented telling jokes.    07/06/2023   Severe right knee arthritis  Cellulitis by a dog scratch which we treated last time with antibiotics and that is completely healed  The severe right knee arthritis we can do much said trying different injections and she is going to physical therapy who they recommended maybe bracing.  I did tell her skin is too thin and the brace might rub and create an issue.  She is using a walker to walk around she does see pain management including doctors [facility]  Complaining of severe pain to the back which I told her I can not help her with in her pain is 8/10.  The knee nenita and subluxes and she has pain throughout most pronounced right now on the anterolateral aspect where she points at.  I reviewed her medications and her medical conditions with her and her granddaughter  No fever no chills no shortness of breath no difficulty with chewing swallowing loss of bowel bladder control or blurry vision double vision loss sense smell or taste      10/09/2023   Right knee severe arthritis  The cellulitis from a dog scratch all resolved   Seen recently by Cardiology Dr. Martins and she was told if she wants to have surgery she can.  She is thinking about not be able to tolerate the pain she is in right now.  There is injections they all lot last maybe 2 weeks.  The last Kenalog injection barely lasted 2 weeks.  She is looking that maybe she can have her surgery we discussed it today with granddaughter and the patient and showed her on the model and showed her x-rays of total knees.  I did tell her it takes roughly 3-6 months for complete healing to occur.  We have tried almost everything except Zilretta.  We will see if that  helps long enough so we can avoid surgery.  Patient wants to have her surgery done but would like to still try 1 more thing prior to undergoing surgery.      No fever no chills no shortness of breath.  She uses a Rollator to walk around      10/23/2023   Right knee severe arthritis  The cellulitis resolved from dog scratch  Today she is wearing a brace and she states that could slide down and she has to tighten it up a little bit.  I did tell her the wrap around brace is much easier to put on than the pull up braces.  She is using the walker to get around.  The neurologist saw her in wants a to undergo OT and PT as well as speech therapy she is about to start that   The injection given last time did not seem to last too long despite the fact she states her pain is 0/10   We will give Zilretta today since she change her mind about having surgery/right TKA.  I did tell her some of her pains are coming from her back she does have lumbar fusion and nerve stimulator in the lumbar spine   She walks with a Rollator  She is here with her daughter and granddaughter    01/25/2024   Right knee severe arthritis  The cellulitis from the dog scratched has resolved   She stated Zilretta seems to work really well for her.  She finally got better until a week ago.  Now her pain is 8/10.  She very rarely takes any Tylenol.  She has using a Rollator to get around.  She is going to physical therapy and dry needling seems to help  No fever no chills no shortness of breath  She is here with family    04/25/2024   Cellulitis from the dog scratched completely resolved   She had fallen tripping over a curb few weeks ago but she only have a small abrasion on the right tibia.  It has not bothering her.    Right knee arthritis and seems to be the Zilretta helped significantly.  She said she started to hurt at this time at 2/10.  She would like to keep on receiving the injections and keep her walking.  She does use her Rollator.  She does have  a lumbar spine stimulator from previous lumbar fusion.  She is avoiding surgery at this time she is 83 years of age as long as injections help we will avoid TKA  No fever no chills no shortness of breath or difficulty with chewing swallowing loss of bowel bladder control blurry vision double vision loss sense smell taste  She had history of UTI couple weeks ago and finished her antibiotics    07/25/2024   Patient severe pain in the right knee and low back.  [facility] we did a small procedure on her recently.  Sometimes she gets confused.  Her granddaughter with her that takes care of her she is looking at maybe place her in a nursing home at this point.  She is getting slightly demented and then they will giving her too many medications got her confused even more.  She stating that her pain is 7 out of 10.  She has been having more weakness in the legs unable to walk.  She finished her physical therapy roughly 2 months ago.  Received Monovisc 05/19/2023 in right knee Zilretta 04/25/2024 which helped much better  No fever no chills no shortness of breath or difficulty with chewing swallowing loss of bowel bladder control   I reviewed CMP and creatinine 0.8 and BUN 16 it is all within normal limits  The pain is so severe she was crying I offered to add Toradol for pain control as an injection on top of her Zilretta   She is in a wheelchair today usually she walks.  Past Medical History:   Diagnosis Date    Arthritis     Cataract     GERD (gastroesophageal reflux disease)     Heart attack 05/23/2022    Hypertension     Stroke      Past Surgical History:   Procedure Laterality Date    ADENOIDECTOMY      ADRENAL GLAND SURGERY      APPENDECTOMY      BACK SURGERY      fusion l 4-5 s 1,2,3  fusion l 2-3    CORONARY ANGIOGRAPHY N/A 05/20/2022    Procedure: ANGIOGRAM, CORONARY ARTERY;  Surgeon: Domingo Martins MD;  Location: [facility] C[facility] LAB;  Service: Cardiology;  Laterality: N/A;     CORONARY ANGIOGRAPHY N/A 05/24/2022    Procedure: ANGIOGRAM, CORONARY ARTERY;  Surgeon: Domingo Martins MD;  Location: [facility] CATH LAB;  Service: Cardiology;  Laterality: N/A;    EYE SURGERY      HEMORRHOID SURGERY      HERNIA REPAIR      HYSTERECTOMY      partial    indirect lumbar decompression      percutaneous placement of interspinous extension blocker    LEFT HEART CATHETERIZATION Left 05/20/2022    Procedure: CATHETERIZATION, HEART, LEFT;  Surgeon: Domingo Martins MD;  Location: [facility] CATH LAB;  Service: Cardiology;  Laterality: Left;    TONSILLECTOMY       Family History   Problem Relation Name Age of Onset    Heart disease Mother      Hypertension Mother      Diabetes Mother      Hypertension Father      Kidney disease Father      Breast cancer Sister       Social History     Socioeconomic History    Marital status:    Tobacco Use    Smoking status: Never    Smokeless tobacco: Never   Substance and Sexual Activity    Alcohol use: No    Drug use: No    Sexual activity: Not Currently     Social Determinants of Health     Financial Resource Strain: Low Risk  (6/4/2024)    Overall Financial Resource Strain (CARDIA)     Difficulty of Paying Living Expenses: Not hard at all   Food Insecurity: Food Insecurity Present (6/4/2024)    Hunger Vital Sign     Worried About Running Out of Food in the Last Year: Sometimes true     Ran Out of Food in the Last Year: Sometimes true   Transportation Needs: No Transportation Needs (6/4/2024)    TRANSPORTATION NEEDS     Transportation : No   Physical Activity: Insufficiently Active (6/4/2024)    Exercise Vital Sign     Days of Exercise per Week: 6 days     Minutes of Exercise per Session: 20 min   Stress: Stress Concern Present (6/4/2024)    [employer] [employer] - Occupational Stress Questionnaire     Feeling of Stress : Very much   Housing Stability: Low Risk  (6/4/2024)    Housing Stability Vital Sign     Unable to Pay for Housing in the Last Year:  No     Homeless in the Last Year: No     Medication List with Changes/Refills   Current Medications    ASPIRIN 81 MG CHEW    Take 81 mg by mouth once daily.    ATORVASTATIN (LIPITOR) 10 MG TABLET    Take 1 tablet (10 mg total) by mouth every evening.    BUSPIRONE (BUSPAR) 5 MG TAB    Take 1 tablet (5 mg total) by mouth 2 (two) times daily.    DULOXETINE (CYMBALTA) 30 MG CAPSULE    Take 30 mg by mouth once daily.    FUROSEMIDE (LASIX) 20 MG TABLET    TAKE 1 TABLET BY MOUTH ONCE A DAY    LOSARTAN (COZAAR) 50 MG TABLET    TAKE 1 TABLET BY MOUTH TWICE DAILY    MEMANTINE (NAMENDA) 5 MG TAB    Take 1 tablet (5 mg total) by mouth 2 (two) times daily.    METOPROLOL SUCCINATE (TOPROL-XL) 25 MG 24 HR TABLET    Take 1 tablet (25 mg total) by mouth once daily.    MUPIROCIN (BACTROBAN) 2 % OINTMENT    Apply topically 2 (two) times daily.    NUCYNTA 50 MG TAB    Take 1 tablet (50 mg total) by mouth every 8 (eight) hours as needed (severe pain). RESTART WHEN OK PER PAIN MANAGEMENT PROVIDER     Review of patient's allergies indicates:   Allergen Reactions    Amitriptyline     Hydrochlorothiazide      Causes muscle cramping    Lisinopril      hyperkalemia    Opioids - morphine analogues Hallucinations     Chest pain and hallucinations    Oxycodone     Percocet [oxycodone-acetaminophen] Other (See Comments)     Seizures    Belbuca [buprenorphine hcl] Nausea And Vomiting and Other (See Comments)     Black out     Codeine Nausea Only and Rash    Prazosin Other (See Comments)     dizziness     Review of Systems   Constitutional: Negative for decreased appetite.   HENT:  Negative for tinnitus.    Eyes:  Negative for double vision.   Cardiovascular:  Negative for chest pain.   Respiratory:  Negative for wheezing.    Hematologic/Lymphatic: Negative for bleeding problem.   Skin:  Negative for dry skin.   Musculoskeletal:  Positive for arthritis, back pain, joint pain and muscle weakness. Negative for gout, neck pain and stiffness.    Gastrointestinal:  Negative for abdominal pain.   Genitourinary:  Negative for bladder incontinence.   Neurological:  Negative for numbness, paresthesias and sensory change.   Psychiatric/Behavioral:  Negative for altered mental status.        Objective:   Body mass index is 23.33 kg/m².  There were no vitals filed for this visit.         General    Constitutional: She is oriented to person, place, and time. She appears well-developed.   HENT:   Head: Atraumatic.   Eyes: EOM are normal.   Pulmonary/Chest: Effort normal.   Neurological: She is alert and oriented to person, place, and time.   Psychiatric: Judgment normal.           Ambulating well with a walker very steady  In the sitting position her pelvis is level  Bilateral hips passive motion without pain in the groin.    Hip flexors and abductors and adductors are slightly weak at 5-/5   Right knee with mild to moderate swelling.  There is no warmness to touch.  There is crepitus with motion.  She has 5-120 degrees and range of motion.  No defect in the patella or quadriceps tendon.  There is weakness around 4/5 in the quads and hamstrings.  Pain is throughout the knee joint .  Slightly loose lateral collateral to valgus and varus stressing with subluxation and clicking felt today  Left knee mild degeneration and mild tenderness.  There is no swelling.  Good motion 0-125.  No defect in the patella or quadriceps tendon   Left calf with scratches on the medial aspect proximal 3rd.  Resolved .   There is ecchymosis around the area also resolved.    Right tibia There is a 3 cm scratched over the anterior tibia mid shaft without evidence of infection or erythema  Calves are soft bilaterally and nontender  There is multiple varicosities in the lower extremity  Skin over the knees within normal limits no obvious lesions    Relevant imaging results reviewed and interpreted by me, discussed with the patient and / or family today   X-ray 01/25/2024 right knee with  complete loss of lateral joint space with severe valgus deformity marginal osteophytes sclerosis of the knee.  The left knee joint space is maintained mild degenerative changes  X-ray 11/10/2022 of the hips within normal limits no evidence of arthritis  X-ray of the knees I 11/10/2022 on the right side showing there is a suprapatellar hematoma there is marginal osteophytic changes there is medial subluxation of the femur on the tibia consistent with severe arthritic change.  Assessment:     Encounter Diagnoses   Name Primary?    Arthritis of knee, right Yes    Recurrent right knee instability     History of lumbar fusion         Plan:   Arthritis of knee, right  -     Large Joint Aspiration/Injection: R knee    Recurrent right knee instability    History of lumbar fusion         Patient Instructions   Severe right knee pain with weakness   You just finish therapy approximately 2 months ago  We proceed with injection in the right knee with Zilretta which is the slow-release steroid  You been having quite a bit of severe back pain you [facility]  You having severe back pain today and I will give you 60 mg Toradol in the right deltoid area to control her pain   I reviewed the electronic record labs on 04/16/2024 creatinine is 0.8 BUN 16 and CMP is within normal limits  You will return in 3 months        Disclaimer: This note was prepared using a voice recognition system and is likely to have sound alike errors within the text.

## 2024-07-25 NOTE — PATIENT INSTRUCTIONS
Severe right knee pain with weakness   You just finish therapy approximately 2 months ago  We proceed with injection in the right knee with Zilretta which is the slow-release steroid  You been having quite a bit of severe back pain you recently [facility] a [facility]  You having severe back pain today and I will give you 60 mg Toradol in the right deltoid area to control her pain   I reviewed the electronic record labs on 04/16/2024 creatinine is 0.8 BUN 16 and CMP is within normal limits  You will return in 3 months

## 2024-07-26 ENCOUNTER — HOSPITAL ENCOUNTER (OUTPATIENT)
Dept: RADIOLOGY | Facility: HOSPITAL | Age: 84
Discharge: HOME | End: 2024-07-26
Attending: FAMILY MEDICINE
Payer: MEDICARE

## 2024-07-26 ENCOUNTER — OFFICE VISIT (OUTPATIENT)
Dept: PRIMARY CARE CLINIC | Facility: CLINIC | Age: 84
End: 2024-07-26
Payer: MEDICARE

## 2024-07-26 VITALS
BODY MASS INDEX: 24.85 KG/M2 | WEIGHT: 131.63 LBS | DIASTOLIC BLOOD PRESSURE: 60 MMHG | HEART RATE: 55 BPM | SYSTOLIC BLOOD PRESSURE: 136 MMHG | OXYGEN SATURATION: 98 % | HEIGHT: 61 IN | TEMPERATURE: 98 F

## 2024-07-26 DIAGNOSIS — I69.354 HEMIPLEGIA AND HEMIPARESIS FOLLOWING CEREBRAL INFARCTION AFFECTING LEFT NON-DOMINANT SIDE: ICD-10-CM

## 2024-07-26 DIAGNOSIS — I69.354 HEMIPLEGIA AND HEMIPARESIS FOLLOWING CEREBRAL INFARCTION AFFECTING LEFT NON-DOMINANT SIDE: Primary | ICD-10-CM

## 2024-07-26 PROCEDURE — 99213 OFFICE O/P EST LOW 20 MIN: CPT | Mod: PBBFAC,PN | Performed by: FAMILY MEDICINE

## 2024-07-26 PROCEDURE — 99999 PR PBB SHADOW E&M-EST. PATIENT-LVL III: CPT | Mod: PBBFAC,,, | Performed by: FAMILY MEDICINE

## 2024-07-26 PROCEDURE — 71046 X-RAY EXAM CHEST 2 VIEWS: CPT | Mod: TC,FY,PO

## 2024-07-26 PROCEDURE — 71046 X-RAY EXAM CHEST 2 VIEWS: CPT | Mod: 26,,, | Performed by: RADIOLOGY

## 2024-07-26 NOTE — PATIENT INSTRUCTIONS
CXR today  Bring form to fill out next week    If you are feeling unwell, we'd like to be the first ones to know here at [facility]! Please give us a call. Same day appointments are our top priority to keep you well and out of the emergency rooms and hospitals. Call [phone] for our direct line. After hours advice is always available. Please call [phone] after hours to speak to the on-call team.

## 2024-07-26 NOTE — PROGRESS NOTES
Leslie Wood  07/26/2024  [id number]    Natalia Gomez MD  Patient Care Team:  Natalia Gomez MD as PCP - General (Family Medicine)  Elva Israel MD as Consulting Physician (Pain Medicine)  Daniel Bonilla MD (Ophthalmology)  Viki Ruiz PA-C as Physician Assistant (Internal Medicine)  Urvashi Valle as ED Navigator  Program, Medicare Shared Savings as Hypertension Digital Medicine Contract  Mckayla Eisenberg MD as Hypertension Digital Medicine Responsible Provider (Family Medicine)  Norma Graff, Clarice as Hypertension Digital Medicine Clinician (Pharmacist)  Zuri Olivas RN as Outpatient   Lucy Sim LMSW as Outpatient Case Management Social Worker  Future Appointments       Date Provider Specialty Appt Notes    7/31/2024 Domingo Martins MD Cardiology referral for  Abnormal EKG [R94.31]  Feeling unwell [R68.89]  Dizziness [R42]  from Natalia Gomez,    9/3/2024 Domingo Martins MD Cardiology 6 m    9/6/2024 Raghu Felix MD Neurology 3 month f/u    10/4/2024 Natalia Gomez MD Primary Care 3 month f/u    11/11/2024 Luis Ibarra MD Orthopedics right knee zilretta (R knee zilretta 7/25/24 - R knee monovisc 05/19/23, toradol injection 07/25/24,LOV 07/25/24, X-Ray 01/25/24)             Hosp/ED/Urgent Care:    Visit Type:   Chief Complaint:  No chief complaint on file.      History of Present Illness:   Need a cxr for nursing home placement. - [facility]  Here w gdtr Ariane GRAHAM is now daughter, not Ariane Nelson states she is feeling pretty well today      The following were reviewed: Active problem list, medication list, allergies, family history, social history, and Health Maintenance.     Medications have been reviewed and reconciled with patient at visit today.      Review of Systems   Respiratory:  Negative for shortness of breath.    Cardiovascular:  Negative for chest pain.   Neurological:  Positive for weakness (left leg, right arm-  chronic).      See HPI above  PHQ-4 Score: 5    Exam:  Vitals:    07/26/24 1129   BP: 136/60   Pulse: (!) 55   Temp: 98.1 °F (36.7 °C)     Weight: 59.7 kg (131 lb 9.8 oz)   Body mass index is 24.87 kg/m².    BP Readings from Last 3 Encounters:   07/26/24 136/60   07/20/24 (!) 155/71   07/03/24 (!) 132/58        Wt Readings from Last 3 Encounters:   07/26/24 1129 59.7 kg (131 lb 9.8 oz)   07/25/24 1020 56 kg (123 lb 7.3 oz)   07/20/24 0711 56 kg (123 lb 7.3 oz)        Physical Exam  Vitals and nursing note reviewed.   Constitutional:       General: She is not in acute distress.     Appearance: She is not toxic-appearing.   HENT:      Head: Normocephalic and atraumatic.   Eyes:      Extraocular Movements: Extraocular movements intact.      Pupils: Pupils are equal, round, and reactive to light.   Cardiovascular:      Rate and Rhythm: Normal rate and regular rhythm.      Pulses: Normal pulses.      Heart sounds: Normal heart sounds.   Pulmonary:      Effort: Pulmonary effort is normal.      Breath sounds: Normal breath sounds.   Musculoskeletal:         General: No swelling.   Skin:     General: Skin is warm.      Capillary Refill: Capillary refill takes less than 2 seconds.   Neurological:      Mental Status: She is oriented to person, place, and time. Mental status is at baseline.   Psychiatric:         Mood and Affect: Mood normal.          Laboratory Reviewed    Lab Results   Component Value Date    WBC 7.95 07/20/2024    HGB 14.5 07/20/2024    HCT 43.2 07/20/2024     07/20/2024    CHOL 128 04/03/2024    TRIG 78 04/03/2024    HDL 46 04/03/2024    ALT 19 07/20/2024    AST 24 07/20/2024     07/20/2024    K 4.0 07/20/2024     07/20/2024    CREATININE 1.0 07/20/2024    BUN 33 (H) 07/20/2024    CO2 23 07/20/2024    TSH 1.388 11/08/2023    INR 1.0 02/02/2023    HGBA1C 5.6 11/08/2023         Health Maintenance  Health Maintenance Topics with due status: Not Due       Topic Last Completion Date     TETANUS VACCINE 06/24/2019    Influenza Vaccine 11/02/2023    Lipid Panel 04/03/2024     Health Maintenance Due   Topic Date Due    Shingles Vaccine (1 of 2) Never done    RSV Vaccine (Age 60+ and Pregnant patients) (1 - 1-dose 60+ series) Never done    COVID-19 Vaccine (7 - 2023-24 season) 09/01/2023       Assessment and Plan   84 y.o. female with multiple co-morbid illnesses here for continued follow up of medical problems.      The encounter diagnosis was Hemiplegia and hemiparesis following cerebral infarction affecting left non-dominant side.  1. Hemiplegia and hemiparesis following cerebral infarction affecting left non-dominant side  -     X-Ray Chest PA And Lateral; Future; Expected date: 07/26/2024        Health Maintenance         Date Due Completion Date    Shingles Vaccine (1 of 2) Never done ---    RSV Vaccine (Age 60+ and Pregnant patients) (1 - 1-dose 60+ series) Never done ---    COVID-19 Vaccine (7 - 2023-24 season) 09/01/2023 12/31/2021    Influenza Vaccine (1) 09/01/2024 11/2/2023    Lipid Panel 04/03/2025 4/3/2024    TETANUS VACCINE 06/24/2029 6/24/2019            Discharge instructions        -Patient's lab results were reviewed and discussed with patient  -Treatment options and alternatives were discussed with the patient. Patient expressed understanding. Patient was given the opportunity to ask questions and be an active participant in their medical care. Patient had no further questions or concerns at this time.     Follow up: No follow-ups on file.

## 2024-07-29 ENCOUNTER — TELEPHONE (OUTPATIENT)
Dept: PRIMARY CARE CLINIC | Facility: CLINIC | Age: 84
End: 2024-07-29
Payer: MEDICARE
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
## 2024-07-29 NOTE — TELEPHONE ENCOUNTER
Spoke with patient's granddaughter; she indicates that her grandmother is doing okay. Ariane continues to work on getting patient admitted to [facility].  She has been helped by the [facility] RN and SW.  Ariane plans to see Dr. Gomez for completion of additional paperwork for the nursing home placement.  Ariane will follow up with this LCSW for any additional help needed. She will also remain in contact with the [facility] staff for assistance.

## 2024-08-05 ENCOUNTER — OFFICE VISIT (OUTPATIENT)
Dept: PRIMARY CARE CLINIC | Facility: CLINIC | Age: 84
End: 2024-08-05
Payer: MEDICARE

## 2024-08-05 VITALS
OXYGEN SATURATION: 97 % | TEMPERATURE: 97 F | HEART RATE: 80 BPM | SYSTOLIC BLOOD PRESSURE: 126 MMHG | BODY MASS INDEX: 24.62 KG/M2 | HEIGHT: 61 IN | DIASTOLIC BLOOD PRESSURE: 60 MMHG | WEIGHT: 130.38 LBS

## 2024-08-05 DIAGNOSIS — I69.30 LATE EFFECT OF CEREBROVASCULAR ACCIDENT (CVA): ICD-10-CM

## 2024-08-05 DIAGNOSIS — N18.31 STAGE 3A CHRONIC KIDNEY DISEASE: ICD-10-CM

## 2024-08-05 DIAGNOSIS — R53.1 WEAKNESS: ICD-10-CM

## 2024-08-05 DIAGNOSIS — Z09 HOSPITAL DISCHARGE FOLLOW-UP: Primary | ICD-10-CM

## 2024-08-05 DIAGNOSIS — E86.0 DEHYDRATION: ICD-10-CM

## 2024-08-05 DIAGNOSIS — F05: ICD-10-CM

## 2024-08-05 DIAGNOSIS — F01.B4 MODERATE VASCULAR DEMENTIA WITH ANXIETY: ICD-10-CM

## 2024-08-05 PROCEDURE — 99215 OFFICE O/P EST HI 40 MIN: CPT | Mod: PBBFAC,PN | Performed by: FAMILY MEDICINE

## 2024-08-05 PROCEDURE — 99999 PR PBB SHADOW E&M-EST. PATIENT-LVL V: CPT | Mod: PBBFAC,,, | Performed by: FAMILY MEDICINE

## 2024-08-05 PROCEDURE — 99214 OFFICE O/P EST MOD 30 MIN: CPT | Mod: S$PBB,,, | Performed by: FAMILY MEDICINE

## 2024-08-06 ENCOUNTER — TELEPHONE (OUTPATIENT)
Dept: PRIMARY CARE CLINIC | Facility: CLINIC | Age: 84
End: 2024-08-06
Payer: MEDICARE

## 2024-08-14 ENCOUNTER — OUTPATIENT CASE MANAGEMENT (OUTPATIENT)
Dept: ADMINISTRATIVE | Facility: OTHER | Age: 84
End: 2024-08-14
Payer: MEDICARE

## 2024-08-14 NOTE — LETTER
Leslie Wood  [id number]  [place] [address]      Dear Leslie Wood,     I am writing from the Outpatient Care Management Department at [facility]. I have been unsuccessful at reaching you since we spoke on 7/24.  I hope you found the assistance previously provided to you helpful.     Please contact me at [phone] from 8:00AM to 4:30 PM on Monday thru Friday.     As you know, [facility] also has a program with a nurse available 24/7 to answer questions or provide medical advice.  [facility] can be reached at [phone].    Sincerely,       Lucy Sim LMSW  Outpatient Care Management

## 2024-08-28 ENCOUNTER — OUTPATIENT CASE MANAGEMENT (OUTPATIENT)
Dept: ADMINISTRATIVE | Facility: OTHER | Age: 84
End: 2024-08-28
Payer: MEDICARE

## 2024-08-28 ENCOUNTER — TELEPHONE (OUTPATIENT)
Dept: PRIMARY CARE CLINIC | Facility: CLINIC | Age: 84
End: 2024-08-28
Payer: MEDICARE

## 2024-08-28 NOTE — PROGRESS NOTES
Outpatient Care Management  Plan of Care Follow Up Visit    Patient: Leslie Wood  MRN: [id number]  Date of Service: 08/28/2024  Completed by: Zuri Olivas RN  Referral Date: 05/17/2024    Reason for Visit   Patient presents with    [facility] RN Follow Up Call    Case Closure       Brief Summary: Phone contact made with Mrs Nelson's grand daughter, Ariane, today for follow up and d/c from [facility]. Encouraged Ariane to assist Mrs Nelson to seek treatment with the appropriate provider for any symptoms causing concern in the future and she voiced understanding. She also agreed with plan for Mrs Nelson's d/c from [facility].   Next Steps: D/C from [facility] today. Zuri Olivas RN

## 2024-08-28 NOTE — PROGRESS NOTES
Outpatient Care Management   - Care Plan Follow Up    Patient: Leslie Wood  MRN:  [id number]  Date of Service:  8/28/2024  Completed by:  Lucy Sim LMSW  Referral Date: 05/17/2024    Reason for Visit   Patient presents with    Other     8/14/2024  1st attempt to complete Follow-Up  for Outpatient Care Management, unable to leave  message.  Will mail letter.    8/21/2024  2nd attempt to complete Follow-Up  for Outpatient Care Management, left message.  Communicated with OPCM RN SILVANO Keating       OPCM [facility] Follow Up Call     08/28/2024      Case Closure     08/28/2024         Brief Summary: OPCM RN and SW completed follow up call. Pcg denied any other needs or concerns. Reviewed discussed resources through out enrollment. Discussed case closure. PCG agreeable . Agrees to reach out to OPCM team if there are any other needs or questions. Will notify OPCM RN of case closure.    Complex Care Plan     Care plan was discussed and completed today with input from patient and/or caregiver.    Patient Instructions     No follow-ups on file.

## 2024-08-28 NOTE — TELEPHONE ENCOUNTER
3 week f/u appointment cancelled as request per pt granddaughter; follow up appt is already scheduled     ----- Message from Zuri Olivas RN sent at 8/28/2024  1:26 PM CDT -----  Dr Gomez/Staff,     Mrs Nelson's grand daughter, Ariane, advised this AM that she will not be able to bring Mrs Nelson to her scheduled appt tomorrow at 3 PM and requested that it be cancelled. She will plan to bring her to the one scheduled for 9/12/24 after she has attended her neuro and cardio appts so these can be reviewed.     Thank you,     Zuri Olivas RN, [facility]  Outpatient Case Management  [phone]  Ext [phone]  [email or web address]

## 2024-09-03 ENCOUNTER — TELEPHONE (OUTPATIENT)
Dept: CARDIOLOGY | Facility: CLINIC | Age: 84
End: 2024-09-03
Payer: MEDICARE

## 2024-09-03 ENCOUNTER — OFFICE VISIT (OUTPATIENT)
Dept: CARDIOLOGY | Facility: CLINIC | Age: 84
End: 2024-09-03
Payer: MEDICARE

## 2024-09-03 VITALS
BODY MASS INDEX: 24.95 KG/M2 | DIASTOLIC BLOOD PRESSURE: 70 MMHG | HEART RATE: 65 BPM | WEIGHT: 132.06 LBS | OXYGEN SATURATION: 96 % | SYSTOLIC BLOOD PRESSURE: 116 MMHG

## 2024-09-03 DIAGNOSIS — I42.8 NONISCHEMIC CARDIOMYOPATHY: ICD-10-CM

## 2024-09-03 DIAGNOSIS — E11.69 HYPERLIPIDEMIA ASSOCIATED WITH TYPE 2 DIABETES MELLITUS: ICD-10-CM

## 2024-09-03 DIAGNOSIS — Q24.5 CORONARY-MYOCARDIAL BRIDGE: ICD-10-CM

## 2024-09-03 DIAGNOSIS — E11.9 TYPE 2 DIABETES MELLITUS WITHOUT COMPLICATION, WITHOUT LONG-TERM CURRENT USE OF INSULIN: ICD-10-CM

## 2024-09-03 DIAGNOSIS — I10 HYPERTENSION, UNSPECIFIED TYPE: ICD-10-CM

## 2024-09-03 DIAGNOSIS — I51.81 STRESS-INDUCED CARDIOMYOPATHY: ICD-10-CM

## 2024-09-03 DIAGNOSIS — I10 ESSENTIAL HYPERTENSION: ICD-10-CM

## 2024-09-03 DIAGNOSIS — I65.23 CAROTID STENOSIS, BILATERAL: ICD-10-CM

## 2024-09-03 DIAGNOSIS — G45.9 TIA (TRANSIENT ISCHEMIC ATTACK): Primary | ICD-10-CM

## 2024-09-03 DIAGNOSIS — Z78.9 STATIN INTOLERANCE: ICD-10-CM

## 2024-09-03 DIAGNOSIS — E78.5 HYPERLIPIDEMIA ASSOCIATED WITH TYPE 2 DIABETES MELLITUS: ICD-10-CM

## 2024-09-03 PROCEDURE — 99999 PR PBB SHADOW E&M-EST. PATIENT-LVL III: CPT | Mod: PBBFAC,,, | Performed by: INTERNAL MEDICINE

## 2024-09-03 PROCEDURE — 99213 OFFICE O/P EST LOW 20 MIN: CPT | Mod: PBBFAC | Performed by: INTERNAL MEDICINE

## 2024-09-03 PROCEDURE — 99214 OFFICE O/P EST MOD 30 MIN: CPT | Mod: S$PBB,,, | Performed by: INTERNAL MEDICINE

## 2024-09-03 NOTE — TELEPHONE ENCOUNTER
noted                ----- Message from Yuli Shelley sent at 9/3/2024  9:33 AM CDT -----  Name of Caller:Patient's granddaughter  When is the first available appointment?today  Best Call Back Number:[phone]  Additional Information: Patient will be approximately 15 minutes late

## 2024-09-03 NOTE — PROGRESS NOTES
Subjective:   Patient ID:  Leslie Wood is a 84 y.o. female who presents for evaluation of No chief complaint on file.  September 3, 2024.    Comes in for a six-month follow-up.    She was recently in the hospital with a seizure activity.  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    However she could not get an MRI because of she has a neurostimulator.    He is supposed to see another neurologist for another opinion.    Denies anymore such episodes.    Denies angina.  She has some chronic midepigastric discomfort.  She had 2 coronary angiograms normal in the past.    She gets easily anxious as per patient and granddaughter.        4.24.2024  Was recently in the emergency room for UTI hypertensive emergency.  Did not take her medications that they had social event/stress at home.    She had complain of chest pain her blood pressure was up to 200/95.  Her EKG however was nonischemic as well as his troponins.    She feels well since then.    She is doing well.  She is taking her antibiotics.    Accompanied by by her granddaughter.        2.15.2024  Comes in for six-month follow-up.    She states that 5 days ago she had some midepigastric chest pain.  However EKG looks normal today.    She recently lost her daughter.    She is accompanied by her granddaughter today.    Was 1 episode however none today.    8.30.2023  Comes in for follow-up.    Two weeks cardiac monitor non relevant. no more episodes of TIA  Denies any chest pain, palpitations syncope presyncope   She had her colonoscopy done.    No lower extremity swelling.    Walks with a walker.  Doing occupational therapy.    History of chronic knee pain.    05/04/2023.    Comes in for evaluation for loop recorder.    She had seen neurology.    She had the 14 days monitor did not show any arrhythmias.    She does not have any palpitations.    She is wobbly on her walking.    She is bruising very easily with aspirin.    Her CT scan did not  show multiple areas of ischemia to suspect embolic stroke.    She also has history with reveal stricture and 80 is not possible for her.    She states that when she wore the monitor she was and 3 where in the whole time and she is not sure about the quality of the monitor and she also states that she missed the new monitor that was sent to her house.      1.31.2023  She presented last month to the hospital with syncope.  Her beta-blocker dose was decreased.    And was switched from carvedilol to Toprol 25 mg daily.    In the last 2 days she has been out of her metoprolol.    She states that she is awaiting an appointment in March for evaluation for knee replacement.    She denies any more chest pain.    Her repeat echocardiogram in the hospital showed recovery of her LV EF from 30-40% to 60%.    She denies any dyspnea, chest pain, palpitations, syncope presyncope.        7.27.2022  Comes in for a follow up after recent visit to the ED  She states after she took the first dose of recently prescribed BELBUCA for back pain, she felt immediately dyspnea, resolved in the ED  She had no changes on ekg and two negative troponins with an elevated BNP of 200   She has been off the lasix, baseline BNP is normal   She does reports FUENTES NYHA 1-2 lately, but no orthopnea and no leg swelling     6.6.2022  Comes in for follow-up.  She was admitted twice to the hospital last month with chest pain.  Had 2 heart catheterization.  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  Her EF is low.  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  She denies any more chest pain.  Her memory loss has been worse as per prior visit with her daughter in the hospital.  No bleeding  Also seen Dr. Carl for 2nd opinion    Interval hx: 2/11/2021   Complains of occipital headache  No chest pain, no dyspnea  States she is doing rehab and sometimes her BP is 200   Not taking  lasix. On imdur  Esr came back normal after last visit       Initial HPI: 10/1/2020 Patient 80-year-old, prediabetic, with history of hypertension, who recently had a stroke about 3 months ago and was hospitalized at [facility].  She does not recall what kind of cardiac workup she underwent while at that hospital.  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  Her left-sided weakness has improved since the stroke.  She is undergoing rehab.  And should undergo speech therapy soon as she states.  She reports compliance with her blood pressure medications, however she reports feeling dizzy when she stands up, she does not get dizzy or any other circumstances.  She states that she is not drinking enough water.  Also she complains of bilateral lower extremity mild swelling that comes on and off when she takes or not take her amlodipine.  She denies any chest pain, dyspnea, orthopnea, PND, palpitations, syncope, presyncope, bleeding.                Past Medical History:   Diagnosis Date    Alzheimer's disease, unspecified (CODE)     Arthritis     Cataract     GERD (gastroesophageal reflux disease)     Heart attack 05/23/2022    Hypertension     Stroke        Past Surgical History:   Procedure Laterality Date    ADENOIDECTOMY      ADRENAL GLAND SURGERY      APPENDECTOMY      BACK SURGERY      fusion l 4-5 s 1,2,3  fusion l 2-3    CORONARY ANGIOGRAPHY N/A 05/20/2022    Procedure: ANGIOGRAM, CORONARY ARTERY;  Surgeon: Domingo Martins MD;  Location: [facility] CATH LAB;  Service: Cardiology;  Laterality: N/A;    CORONARY ANGIOGRAPHY N/A 05/24/2022    Procedure: ANGIOGRAM, CORONARY ARTERY;  Surgeon: Domingo Martins MD;  Location: [facility] CATH LAB;  Service: Cardiology;  Laterality: N/A;    EYE SURGERY      HEMORRHOID SURGERY      HERNIA REPAIR      HYSTERECTOMY      partial    indirect lumbar decompression      percutaneous placement of interspinous extension blocker    LEFT HEART CATHETERIZATION Left 05/20/2022     Procedure: CATHETERIZATION, HEART, LEFT;  Surgeon: Domingo Martins MD;  Location: [facility] CATH LAB;  Service: Cardiology;  Laterality: Left;    TONSILLECTOMY         Social History     Tobacco Use    Smoking status: Never    Smokeless tobacco: Never   Substance Use Topics    Alcohol use: No    Drug use: No       Family History   Problem Relation Name Age of Onset    Heart disease Mother      Hypertension Mother      Diabetes Mother      Hypertension Father      Kidney disease Father      Breast cancer Sister         Review of Systems   Constitutional: Negative for fever and malaise/fatigue.   HENT: Negative for sore throat.    Eyes: Negative for blurred vision.   Cardiovascular: Negative for chest pain, claudication, cyanosis, dyspnea on exertion, irregular heartbeat, leg swelling, near-syncope, orthopnea, palpitations, paroxysmal nocturnal dyspnea and syncope.         Current Outpatient Medications on File Prior to Visit   Medication Sig    aspirin 81 MG Chew Take 81 mg by mouth once daily.    atorvastatin (LIPITOR) 10 MG tablet Take 1 tablet (10 mg total) by mouth every evening.    busPIRone (BUSPAR) 5 MG Tab Take 1 tablet (5 mg total) by mouth 2 (two) times daily.    DULoxetine (CYMBALTA) 30 MG capsule Take 30 mg by mouth once daily.    losartan (COZAAR) 50 MG tablet TAKE 1 TABLET BY MOUTH TWICE DAILY    memantine (NAMENDA) 5 MG Tab Take 1 tablet (5 mg total) by mouth 2 (two) times daily.    metoprolol succinate (TOPROL-XL) 25 MG 24 hr tablet Take 1 tablet (25 mg total) by mouth once daily.    mupirocin (BACTROBAN) 2 % ointment Apply topically 2 (two) times daily.    NUCYNTA 50 mg Tab Take 1 tablet (50 mg total) by mouth every 8 (eight) hours as needed (severe pain). RESTART WHEN OK PER PAIN MANAGEMENT PROVIDER    furosemide (LASIX) 20 MG tablet TAKE 1 TABLET BY MOUTH ONCE A DAY    [DISCONTINUED] carvediloL (COREG) 6.25 MG tablet Take 1 tablet (6.25 mg total) by mouth 2 (two) times daily. TAKE (1) TABLET BY MOUTH  2 TIMES A DAY WITH MEALS    [DISCONTINUED] cloNIDine (CATAPRES) 0.1 MG tablet Take 1 tablet (0.1 mg total) by mouth 2 (two) times daily. To take an extra dose if BP >160/90    [DISCONTINUED] diclofenac sodium (VOLTAREN) 1 % Gel Apply 2 g topically 3 (three) times daily.    [DISCONTINUED] isosorbide mononitrate (IMDUR) 30 MG 24 hr tablet TAKE 1 TABLET BY MOUTH TWICE A DAY    [DISCONTINUED] metoprolol tartrate (LOPRESSOR) 25 MG tablet Take 1 tablet (25 mg total) by mouth 3 (three) times daily.    [DISCONTINUED] nitroGLYCERIN (NITROSTAT) 0.4 MG SL tablet Place 1 tablet (0.4 mg total) under the tongue every 5 (five) minutes as needed for Chest pain.    [DISCONTINUED] valsartan (DIOVAN) 160 MG tablet TAKE 1 TABLET BY MOUTH TWICE A DAY     No current facility-administered medications on file prior to visit.       Objective:   Objective:  Wt Readings from Last 3 Encounters:   09/03/24 59.9 kg (132 lb 0.9 oz)   08/05/24 59.1 kg (130 lb 6.4 oz)   07/26/24 59.7 kg (131 lb 9.8 oz)     Temp Readings from Last 3 Encounters:   08/05/24 96.6 °F (35.9 °C) (Temporal)   07/26/24 98.1 °F (36.7 °C) (Oral)   07/20/24 97.3 °F (36.3 °C) (Oral)     BP Readings from Last 3 Encounters:   09/03/24 116/70   08/05/24 126/60   07/26/24 136/60     Pulse Readings from Last 3 Encounters:   09/03/24 65   08/05/24 80   07/26/24 (!) 55       Physical Exam  Vitals reviewed.   Constitutional:       Appearance: She is well-developed.   HENT:      Head: Normocephalic and atraumatic.   Eyes:      General: No scleral icterus.     Conjunctiva/sclera: Conjunctivae normal.   Cardiovascular:      Rate and Rhythm: Normal rate and regular rhythm.      Pulses: Intact distal pulses.      Heart sounds: Normal heart sounds. No murmur heard.           Lab Results   Component Value Date    CHOL 128 04/03/2024    CHOL 139 11/08/2023    CHOL 96 (L) 02/01/2023     Lab Results   Component Value Date    HDL 46 04/03/2024    HDL 45 11/08/2023    HDL 47 02/01/2023     Lab  Results   Component Value Date    LDLCALC 66.4 04/03/2024    LDLCALC 64.8 11/08/2023    LDLCALC 38.8 (L) 02/01/2023     Lab Results   Component Value Date    TRIG 78 04/03/2024    TRIG 146 11/08/2023    TRIG 51 02/01/2023     Lab Results   Component Value Date    CHOLHDL 35.9 04/03/2024    CHOLHDL 32.4 11/08/2023    CHOLHDL 49.0 02/01/2023       Chemistry        Component Value Date/Time     08/01/2024 0355     07/20/2024 0718    K 4.7 08/01/2024 0355    K 4.0 07/20/2024 0718     08/01/2024 0355     07/20/2024 0718    CO2 27 08/01/2024 0355    CO2 23 07/20/2024 0718    BUN 34 (H) 08/01/2024 0355    BUN 33 (H) 07/20/2024 0718    CREATININE 1.08 (H) 08/01/2024 0355    CREATININE 1.0 07/20/2024 0718     07/20/2024 0718        Component Value Date/Time    CALCIUM 8.7 (L) 08/01/2024 0355    CALCIUM 10.1 07/20/2024 0718    ALKPHOS 83 07/20/2024 0718    AST 24 07/20/2024 0718    ALT 19 07/20/2024 0718    BILITOT 0.9 07/20/2024 0718    ESTGFRAFRICA 50 08/01/2024 0355    ESTGFRAFRICA >60 07/23/2022 2111    EGFRNONAA >60 07/23/2022 2111          Lab Results   Component Value Date    TSH 0.960 07/31/2024     Lab Results   Component Value Date    INR 1.1 07/30/2024    INR 1.0 02/02/2023    INR 1.1 05/24/2022     Lab Results   Component Value Date    WBC 7.95 07/20/2024    HGB 14.5 07/20/2024    HCT 43.2 07/20/2024    MCV 91 07/20/2024     07/20/2024     BNP  @LABRCNTIP(BNP,BNPTRIAGEBLO)@  CrCl cannot be calculated (Patient's most recent lab result is older than the maximum 7 days allowed.).     Imaging:  ======  Results for orders placed during the hospital encounter of 03/02/20   Echo Color Flow Doppler? Yes    Narrative · Mild concentric left ventricular hypertrophy.  · Normal left ventricular systolic function. The estimated ejection   fraction is 60%.  · Normal LV diastolic function.  · Normal right ventricular systolic function.  · Mild aortic regurgitation.  · Normal central venous  pressure (3 mmHg).  · Trivial pericardial effusion.        No results found for this or any previous visit.  No results found for this or any previous visit.  Results for orders placed during the hospital encounter of 05/11/17   X-Ray Chest PA And Lateral    Narrative XR CHEST PA AND LATERAL, 05/11/17 11:38:46    Clinical indication: Chest pain.    Findings:  Comparison with 05/10/2017.    Epidural leads within the dorsal thoracic spine.  Lower thoracic wedge compression fracture status post kyphoplasty.  Left upper quadrant surgical clips.    Heart size is normal.  Prominent left pericardial fat pad.    Aortic arch calcification.    Lung fields remain clear.    Impression      Stable chest x-ray.  No acute findings.      Electronically signed by: EMERY SAMAYOA MD  Date:     05/11/17  Time:    12:07      No results found for this or any previous visit.  No procedure found.    Diagnostic Results:  ECG: Reviewed  Marked sinus bradycardia with sinus arrhythmia   Abnormal ECG   When compared with ECG of 28-OCT-2020 16:09,   Criteria for Septal infarct are no longer Present   T wave inversion no longer evident in Anterior leads   Confirmed by MD RODERICK, BARB ([id number]) on 11/12/2020 9:25:42 PM       Results for orders placed during the hospital encounter of 05/24/22    Cardiac catheterization    Conclusion  · The estimated blood loss was none.  · There is severe mid LAD intramyocardial bridging improved with betablockers intra op  · There was no evidence of an acute atherothrombotic lesion    The procedure log was documented by Documenter: Te Waller and verified by Domingo Martins MD.    Date: 5/24/2022  Time: 9:49 AM    The ASCVD Risk score (Karissa DK, et al., 2019) failed to calculate for the following reasons:    The 2019 ASCVD risk score is only valid for ages 40 to 79    The patient has a prior MI or stroke diagnosis    Assessment and Plan:   TIA (transient ischemic attack)  -     Echo Saline Bubble? Yes;  Future    Carotid stenosis, bilateral    Stress-induced cardiomyopathy    Hyperlipidemia associated with type 2 diabetes mellitus    Nonischemic cardiomyopathy    Type 2 diabetes mellitus without complication, without long-term current use of insulin    Coronary-myocardial bridge    Essential hypertension    Hypertension, unspecified type    Statin intolerance            Reviewed all tests and above medical conditions with patient in detail and formulated treatment plan.  Recent hospital stay with normal EKG and normal troponin.  Risk factor modification discussed.   Cardiac low salt diet discussed.  Maintaining healthy weight and weight loss goals were discussed in clinic.  Continue with Toprol.  Nonobstructive catheterization in 2022.  Done twice.    EKG normal today.  We will get a troponin.    Recovered nonischemic cardiomyopathy likely secondary to stress  Euvolemic.  Blood pressure controlled.  No angina.    We will get a echo with a bubble study given history of stroke to rule out any PFO.    She is supposed to see another neurologist for a 2nd opinion.      Follow up in 6 months

## 2024-09-05 ENCOUNTER — TELEPHONE (OUTPATIENT)
Dept: NEUROLOGY | Facility: CLINIC | Age: 84
End: 2024-09-05
Payer: MEDICARE

## 2024-09-12 ENCOUNTER — HOSPITAL ENCOUNTER (EMERGENCY)
Facility: HOSPITAL | Age: 84
Discharge: PSYCHIATRIC OR SUBSTANCE USE FACILITY | End: 2024-09-13
Attending: EMERGENCY MEDICINE
Payer: MEDICARE

## 2024-09-12 ENCOUNTER — OFFICE VISIT (OUTPATIENT)
Dept: PRIMARY CARE CLINIC | Facility: CLINIC | Age: 84
End: 2024-09-12
Payer: MEDICARE

## 2024-09-12 VITALS
TEMPERATURE: 98 F | DIASTOLIC BLOOD PRESSURE: 80 MMHG | HEART RATE: 79 BPM | SYSTOLIC BLOOD PRESSURE: 130 MMHG | WEIGHT: 127.13 LBS | BODY MASS INDEX: 24 KG/M2 | OXYGEN SATURATION: 96 % | HEIGHT: 61 IN

## 2024-09-12 DIAGNOSIS — I69.30 LATE EFFECT OF CEREBROVASCULAR ACCIDENT (CVA): ICD-10-CM

## 2024-09-12 DIAGNOSIS — N18.31 STAGE 3A CHRONIC KIDNEY DISEASE: ICD-10-CM

## 2024-09-12 DIAGNOSIS — F01.B4 MODERATE VASCULAR DEMENTIA WITH ANXIETY: Primary | ICD-10-CM

## 2024-09-12 DIAGNOSIS — F03.911 AGITATION DUE TO DEMENTIA: Primary | ICD-10-CM

## 2024-09-12 DIAGNOSIS — N30.00 ACUTE CYSTITIS WITHOUT HEMATURIA: ICD-10-CM

## 2024-09-12 DIAGNOSIS — F33.1 MODERATE EPISODE OF RECURRENT MAJOR DEPRESSIVE DISORDER: ICD-10-CM

## 2024-09-12 DIAGNOSIS — R45.850 HOMICIDAL IDEATION: ICD-10-CM

## 2024-09-12 LAB
ALBUMIN SERPL BCP-MCNC: 3.9 G/DL (ref 3.5–5.2)
ALBUMIN SERPL BCP-MCNC: 4.2 G/DL (ref 3.5–5.2)
ALP SERPL-CCNC: 75 U/L (ref 55–135)
ALP SERPL-CCNC: 86 U/L (ref 55–135)
ALT SERPL W/O P-5'-P-CCNC: 16 U/L (ref 10–44)
ALT SERPL W/O P-5'-P-CCNC: 17 U/L (ref 10–44)
AMPHET+METHAMPHET UR QL: NEGATIVE
ANION GAP SERPL CALC-SCNC: 12 MMOL/L (ref 8–16)
ANION GAP SERPL CALC-SCNC: 15 MMOL/L (ref 8–16)
APAP SERPL-MCNC: <3 UG/ML (ref 10–20)
AST SERPL-CCNC: 26 U/L (ref 10–40)
AST SERPL-CCNC: 29 U/L (ref 10–40)
BACTERIA #/AREA URNS HPF: ABNORMAL /HPF
BARBITURATES UR QL SCN>200 NG/ML: NEGATIVE
BASOPHILS # BLD AUTO: 0.06 K/UL (ref 0–0.2)
BASOPHILS NFR BLD: 0.6 % (ref 0–1.9)
BENZODIAZ UR QL SCN>200 NG/ML: NEGATIVE
BILIRUB SERPL-MCNC: 0.8 MG/DL (ref 0.1–1)
BILIRUB SERPL-MCNC: 0.8 MG/DL (ref 0.1–1)
BILIRUB UR QL STRIP: NEGATIVE
BUN SERPL-MCNC: 25 MG/DL (ref 8–23)
BUN SERPL-MCNC: 25 MG/DL (ref 8–23)
BZE UR QL SCN: NEGATIVE
CALCIUM SERPL-MCNC: 10.6 MG/DL (ref 8.7–10.5)
CALCIUM SERPL-MCNC: 9.9 MG/DL (ref 8.7–10.5)
CANNABINOIDS UR QL SCN: NEGATIVE
CHLORIDE SERPL-SCNC: 108 MMOL/L (ref 95–110)
CHLORIDE SERPL-SCNC: 108 MMOL/L (ref 95–110)
CLARITY UR: CLEAR
CO2 SERPL-SCNC: 20 MMOL/L (ref 23–29)
CO2 SERPL-SCNC: 25 MMOL/L (ref 23–29)
COLOR UR: YELLOW
CREAT SERPL-MCNC: 1.3 MG/DL (ref 0.5–1.4)
CREAT SERPL-MCNC: 1.3 MG/DL (ref 0.5–1.4)
CREAT UR-MCNC: 74.4 MG/DL (ref 15–325)
DIFFERENTIAL METHOD BLD: ABNORMAL
EOSINOPHIL # BLD AUTO: 0.1 K/UL (ref 0–0.5)
EOSINOPHIL NFR BLD: 1.2 % (ref 0–8)
ERYTHROCYTE [DISTWIDTH] IN BLOOD BY AUTOMATED COUNT: 14 % (ref 11.5–14.5)
EST. GFR  (NO RACE VARIABLE): 40 ML/MIN/1.73 M^2
EST. GFR  (NO RACE VARIABLE): 40 ML/MIN/1.73 M^2
ETHANOL SERPL-MCNC: <10 MG/DL
GLUCOSE SERPL-MCNC: 84 MG/DL (ref 70–110)
GLUCOSE SERPL-MCNC: 97 MG/DL (ref 70–110)
GLUCOSE UR QL STRIP: NEGATIVE
HCT VFR BLD AUTO: 40.4 % (ref 37–48.5)
HGB BLD-MCNC: 13.8 G/DL (ref 12–16)
HGB UR QL STRIP: NEGATIVE
HYALINE CASTS #/AREA URNS LPF: 20 /LPF
IMM GRANULOCYTES # BLD AUTO: 0.03 K/UL (ref 0–0.04)
IMM GRANULOCYTES NFR BLD AUTO: 0.3 % (ref 0–0.5)
KETONES UR QL STRIP: NEGATIVE
LEUKOCYTE ESTERASE UR QL STRIP: ABNORMAL
LYMPHOCYTES # BLD AUTO: 2.7 K/UL (ref 1–4.8)
LYMPHOCYTES NFR BLD: 27.8 % (ref 18–48)
MCH RBC QN AUTO: 30.3 PG (ref 27–31)
MCHC RBC AUTO-ENTMCNC: 34.2 G/DL (ref 32–36)
MCV RBC AUTO: 89 FL (ref 82–98)
METHADONE UR QL SCN>300 NG/ML: ABNORMAL
MICROSCOPIC COMMENT: ABNORMAL
MONOCYTES # BLD AUTO: 1.1 K/UL (ref 0.3–1)
MONOCYTES NFR BLD: 11.2 % (ref 4–15)
NEUTROPHILS # BLD AUTO: 5.7 K/UL (ref 1.8–7.7)
NEUTROPHILS NFR BLD: 58.9 % (ref 38–73)
NITRITE UR QL STRIP: NEGATIVE
NRBC BLD-RTO: 0 /100 WBC
OPIATES UR QL SCN: NEGATIVE
PCP UR QL SCN>25 NG/ML: NEGATIVE
PH UR STRIP: 5 [PH] (ref 5–8)
PLATELET # BLD AUTO: 215 K/UL (ref 150–450)
PMV BLD AUTO: 9.9 FL (ref 9.2–12.9)
POTASSIUM SERPL-SCNC: 3.5 MMOL/L (ref 3.5–5.1)
POTASSIUM SERPL-SCNC: 5.1 MMOL/L (ref 3.5–5.1)
PROT SERPL-MCNC: 6.9 G/DL (ref 6–8.4)
PROT SERPL-MCNC: 7.6 G/DL (ref 6–8.4)
PROT UR QL STRIP: NEGATIVE
RBC # BLD AUTO: 4.56 M/UL (ref 4–5.4)
RBC #/AREA URNS HPF: 3 /HPF (ref 0–4)
SARS-COV-2 RDRP RESP QL NAA+PROBE: NEGATIVE
SODIUM SERPL-SCNC: 143 MMOL/L (ref 136–145)
SODIUM SERPL-SCNC: 145 MMOL/L (ref 136–145)
SP GR UR STRIP: 1.01 (ref 1–1.03)
SQUAMOUS #/AREA URNS HPF: 5 /HPF
TOXICOLOGY INFORMATION: ABNORMAL
TSH SERPL DL<=0.005 MIU/L-ACNC: 0.77 UIU/ML (ref 0.4–4)
URN SPEC COLLECT METH UR: ABNORMAL
UROBILINOGEN UR STRIP-ACNC: NEGATIVE EU/DL
WBC # BLD AUTO: 9.61 K/UL (ref 3.9–12.7)
WBC #/AREA URNS HPF: 8 /HPF (ref 0–5)
WBC CLUMPS URNS QL MICRO: ABNORMAL

## 2024-09-12 PROCEDURE — 80053 COMPREHEN METABOLIC PANEL: CPT | Performed by: EMERGENCY MEDICINE

## 2024-09-12 PROCEDURE — 99215 OFFICE O/P EST HI 40 MIN: CPT | Mod: PBBFAC,PN | Performed by: FAMILY MEDICINE

## 2024-09-12 PROCEDURE — 99285 EMERGENCY DEPT VISIT HI MDM: CPT | Mod: 25,27

## 2024-09-12 PROCEDURE — 99999 PR PBB SHADOW E&M-EST. PATIENT-LVL V: CPT | Mod: PBBFAC,,, | Performed by: FAMILY MEDICINE

## 2024-09-12 PROCEDURE — 80307 DRUG TEST PRSMV CHEM ANLYZR: CPT | Performed by: EMERGENCY MEDICINE

## 2024-09-12 PROCEDURE — G0425 INPT/ED TELECONSULT30: HCPCS | Mod: 95,,, | Performed by: PSYCHIATRY & NEUROLOGY

## 2024-09-12 PROCEDURE — 85025 COMPLETE CBC W/AUTO DIFF WBC: CPT | Performed by: EMERGENCY MEDICINE

## 2024-09-12 PROCEDURE — U0002 COVID-19 LAB TEST NON-CDC: HCPCS | Performed by: EMERGENCY MEDICINE

## 2024-09-12 PROCEDURE — 63600175 PHARM REV CODE 636 W HCPCS: Performed by: EMERGENCY MEDICINE

## 2024-09-12 PROCEDURE — 80143 DRUG ASSAY ACETAMINOPHEN: CPT | Performed by: EMERGENCY MEDICINE

## 2024-09-12 PROCEDURE — 84443 ASSAY THYROID STIM HORMONE: CPT | Performed by: EMERGENCY MEDICINE

## 2024-09-12 PROCEDURE — 96365 THER/PROPH/DIAG IV INF INIT: CPT

## 2024-09-12 PROCEDURE — 81000 URINALYSIS NONAUTO W/SCOPE: CPT | Mod: 59 | Performed by: EMERGENCY MEDICINE

## 2024-09-12 PROCEDURE — 82077 ASSAY SPEC XCP UR&BREATH IA: CPT | Performed by: EMERGENCY MEDICINE

## 2024-09-12 PROCEDURE — 25000003 PHARM REV CODE 250: Performed by: EMERGENCY MEDICINE

## 2024-09-12 PROCEDURE — 80053 COMPREHEN METABOLIC PANEL: CPT | Mod: 91 | Performed by: FAMILY MEDICINE

## 2024-09-12 RX ORDER — CEFUROXIME AXETIL 250 MG/1
250 TABLET ORAL EVERY 12 HOURS
Qty: 14 TABLET | Refills: 0 | Status: SHIPPED | OUTPATIENT
Start: 2024-09-12 | End: 2024-09-19

## 2024-09-12 RX ORDER — HYDROCODONE BITARTRATE AND ACETAMINOPHEN 5; 325 MG/1; MG/1
1 TABLET ORAL
Status: COMPLETED | OUTPATIENT
Start: 2024-09-12 | End: 2024-09-12

## 2024-09-12 RX ADMIN — HYDROCODONE BITARTRATE AND ACETAMINOPHEN 1 TABLET: 5; 325 TABLET ORAL at 07:09

## 2024-09-12 RX ADMIN — CEFTRIAXONE 1 G: 1 INJECTION, POWDER, FOR SOLUTION INTRAMUSCULAR; INTRAVENOUS at 09:09

## 2024-09-12 NOTE — PATIENT INSTRUCTIONS
Go to ED for further concerns  Paper for [facility] application signed  Follow up 1 mo if still at home    If you are feeling unwell, we'd like to be the first ones to know here at [facility]! Please give us a call. Same day appointments are our top priority to keep you well and out of the emergency rooms and hospitals. Call [phone] for our direct line. After hours advice is always available. Please call [phone] after hours to speak to the on-call team.
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
## 2024-09-13 VITALS
TEMPERATURE: 98 F | RESPIRATION RATE: 19 BRPM | OXYGEN SATURATION: 98 % | SYSTOLIC BLOOD PRESSURE: 155 MMHG | HEART RATE: 71 BPM | DIASTOLIC BLOOD PRESSURE: 74 MMHG | WEIGHT: 104.94 LBS | BODY MASS INDEX: 19.83 KG/M2

## 2024-09-13 RX ORDER — LORAZEPAM 1 MG/1
1 TABLET ORAL
Status: DISCONTINUED | OUTPATIENT
Start: 2024-09-13 | End: 2024-09-13 | Stop reason: HOSPADM

## 2024-09-13 NOTE — ED NOTES
called to set up tele psych consult, spoke to [place]. Psychiatrist will be Dr. Manohar Glover and will be after 8pm.

## 2024-09-13 NOTE — CONSULTS
[facility]  Psychiatry  Telepsychiatry Consult Note    Please see previous notes:    Patient agreeable to consultation via telepsychiatry.    Tele-Consultation from Psychiatry started: 9/12/2024 at 2044  The chief complaint leading to psychiatric consultation is: aggressive behavior, danger to self and others  This consultation was requested by the Emergency Department attending physician.  The location of the consulting psychiatrist is  [facility] .  The patient location is  [facility] EMERGENCY DEPARTMENT   The patient arrived at the ED at: 1801    Also present with the patient at the time of the consultation: rn    Patient Identification:   Leslie Wood is a 84 y.o. female.    Patient information was obtained from patient.  Patient presented voluntarily to the Emergency Department by ambulance where the patient received see Ambulance Run Sheet prior to arrival.    Consults  Teleconsult Time Documentation  Subjective:     History of Present Illness:  No notes on file Per pt daughter, pt on the way to a dr appointment today when she attempted to jump out of a moving vehicle. Pt has a hx of dementia and has been hostile and violent towards the daughter and grandchildren for the past 3 months. Pt was kicked out of another family members house 3 months ago for the same kind of behavior.     Psychiatric History:   Previous Psychiatric Hospitalizations: No   Previous Medication Trials: yes  Previous Suicide Attempts: no   History of Violence: yes  History of Depression: yes  History of Justine: no  History of Auditory/Visual Hallucination no  History of Delusions: no  Outpatient psychiatrist (current & past): Yes    Substance Abuse History:  Tobacco:No  Alcohol: No  Illicit Substances:No  Detox/Rehab: No    Legal History: Past charges/incarcerations: No     Family Psychiatric History: denies      Social History:  Developmental/Childhood:Achieved all developmental milestones timely  *Education:High School  "Diploma  Employment Status/Finances:Retired   Relationship Status/Sexual Orientation:    Children: 1  Housing Status: Home    history:  NO  Access to gun: NO  Pentecostal:Spiritual without formal affiliation  Recreational activities:Time with family    Psychiatric Mental Status Exam:  Arousal: alert  Sensorium/Orientation: oriented to person  Behavior/Cooperation: normal, cooperative   Speech: normal tone, normal rate, normal pitch, normal volume  Language: grossly intact  Mood: " ok "   Affect: flat  Thought Process: illogical  Thought Content:   Auditory hallucinations: NO  Visual hallucinations: NO  Paranoia: NO  Delusions:  NO  Suicidal ideation: SI  Homicidal ideation: NO  Attention/Concentration:  intact  Memory:    Recent:  Intact   Remote: Decreased    Fund of Knowledge: Impaired   Abstract reasoning: proverbs were concrete  Insight: limited awareness of illness  Judgment: limited      Past Medical History:   Past Medical History:   Diagnosis Date    Alzheimer's disease, unspecified (CODE)     Arthritis     Cataract     GERD (gastroesophageal reflux disease)     Heart attack 05/23/2022    Hypertension     Stroke       Laboratory Data:   Labs Reviewed   CBC W/ AUTO DIFFERENTIAL - Abnormal       Result Value    WBC 9.61      RBC 4.56      Hemoglobin 13.8      Hematocrit 40.4      MCV 89      MCH 30.3      MCHC 34.2      RDW 14.0      Platelets 215      MPV 9.9      Immature Granulocytes 0.3      Gran # (ANC) 5.7      Immature Grans (Abs) 0.03      Lymph # 2.7      Mono # 1.1 (*)     Eos # 0.1      Baso # 0.06      nRBC 0      Gran % 58.9      Lymph % 27.8      Mono % 11.2      Eosinophil % 1.2      Basophil % 0.6      Differential Method Automated     COMPREHENSIVE METABOLIC PANEL - Abnormal    Sodium 143      Potassium 3.5      Chloride 108      CO2 20 (*)     Glucose 84      BUN 25 (*)     Creatinine 1.3      Calcium 9.9      Total Protein 6.9      Albumin 3.9      Total Bilirubin 0.8      " Alkaline Phosphatase 75      AST 26      ALT 16      eGFR 40 (*)     Anion Gap 15     URINALYSIS, REFLEX TO URINE CULTURE - Abnormal    Specimen UA Urine, Clean Catch      Color, UA Yellow      Appearance, UA Clear      pH, UA 5.0      Specific Gravity, UA 1.010      Protein, UA Negative      Glucose, UA Negative      Ketones, UA Negative      Bilirubin (UA) Negative      Occult Blood UA Negative      Nitrite, UA Negative      Urobilinogen, UA Negative      Leukocytes, UA 2+ (*)     Narrative:     Specimen Source->Urine   DRUG SCREEN PANEL, URINE EMERGENCY - Abnormal    Benzodiazepines Negative      Methadone metabolites Presumptive Positive (*)     Cocaine (Metab.) Negative      Opiate Scrn, Ur Negative      Barbiturate Screen, Ur Negative      Amphetamine Screen, Ur Negative      THC Negative      Phencyclidine Negative      Creatinine, Urine 74.4      Toxicology Information SEE COMMENT      Narrative:     Specimen Source->Urine   ACETAMINOPHEN LEVEL - Abnormal    Acetaminophen (Tylenol), Serum <3.0 (*)    URINALYSIS MICROSCOPIC - Abnormal    RBC, UA 3      WBC, UA 8 (*)     WBC Clumps, UA Occasional (*)     Bacteria Occasional      Squam Epithel, UA 5      Hyaline Casts, UA 20 (*)     Microscopic Comment SEE COMMENT      Narrative:     Specimen Source->Urine   TSH    TSH 0.766     ALCOHOL,MEDICAL (ETHANOL)    Alcohol, Serum <10     SARS-COV-2 RNA AMPLIFICATION, QUAL    SARS-CoV-2 RNA, Amplification, Qual Negative         Neurological History:  Seizures: No  Head trauma: No    Allergies:   Review of patient's allergies indicates:   Allergen Reactions    Amitriptyline     Hydrochlorothiazide      Causes muscle cramping    Lisinopril      hyperkalemia    Opioids - morphine analogues Hallucinations     Chest pain and hallucinations    Oxycodone     Percocet [oxycodone-acetaminophen] Other (See Comments)     Seizures    Belbuca [buprenorphine hcl] Nausea And Vomiting and Other (See Comments)     Black out     Codeine  Nausea Only and Rash    Prazosin Other (See Comments)     dizziness       Medications in ER:   Medications   cefTRIAXone (Rocephin) 1 g in D5W 100 mL IVPB (MB+) (1 g Intravenous New Bag 9/12/24 2128)   HYDROcodone-acetaminophen 5-325 mg per tablet 1 tablet (1 tablet Oral Given 9/12/24 1919)       Medications at home: 'i dont know'    No new subjective & objective note has been filed under this hospital service since the last note was generated.      Assessment - Diagnosis - Goals:     Diagnosis/Impression: unspecified depressive disorder, unspecified neurocognitive disorder    Rec: PEC and inpt placement - geropsych preferred - patient has been increasingly violent with family over the past few months, tried to jump out of a moving vehicle after altercation with family today. Pt endorses family triggers and 'my mind is slipping'     Plan of Care communicated to: ER doc    Time with patient: 22 min      More than 50% of the time was spent counseling/coordinating care    Consulting clinician was informed of the encounter and consult note.    Consultation ended: 9/12/2024 at 2150    Manohar Glover MD   Psychiatry  [facility]

## 2024-09-13 NOTE — ED PROVIDER NOTES
SCRIBE #1 NOTE: I, Rony Scherer, am scribing for, and in the presence of, Kaiser Allison Jr., MD. I have scribed the entire note.       History     Chief Complaint   Patient presents with    Psychiatric Evaluation     Per pt daughter, pt on the way to a dr appointment today when she attempted to jump out of a moving vehicle. Pt has a hx of dementia and has been hostile and violent towards the daughter and grandchildren for the past 3 months. Pt was kicked out of another family members house 3 months ago for the same kind of behavior. Daughter is power of  and would like to get a psychiatric evaluation done.    Back Pain     Pt is complaining of chronic lower back pain that has gotten worse.      Review of patient's allergies indicates:   Allergen Reactions    Amitriptyline     Hydrochlorothiazide      Causes muscle cramping    Lisinopril      hyperkalemia    Opioids - morphine analogues Hallucinations     Chest pain and hallucinations    Oxycodone     Percocet [oxycodone-acetaminophen] Other (See Comments)     Seizures    Belbuca [buprenorphine hcl] Nausea And Vomiting and Other (See Comments)     Black out     Codeine Nausea Only and Rash    Prazosin Other (See Comments)     dizziness         History of Present Illness     HPI    9/12/2024, 7:00 PM  History obtained from the daughter and patient      History of Present Illness: Leslie Wood is a 84 y.o. female patient with a PMHx of HTN, arthritis, cataract, GERD, stroke, MI, and Alzheimer's disease who presents to the Emergency Department for a psychiatric evaluation. Symptoms are constant and moderate in severity. No mitigating or exacerbating factors reported. Pt is also complaining of lower back pain and BLE pain. No prior Tx reported.     Per pt's daughter, pt was on the way to a doctor appointment today when she attempted to jump out a moving vehicle. Pt has a hx of dementia and has been hostile and violent towards the daughter and her grandchildren  for the past three months. Pt was kicked out of another family members house 3 months ago for the same kind of behavior. Pt's daughter has the power of  and would like to get a psychiatric evaluation done.    Arrival mode: Personal vehicle      PCP: Natalia Gomez MD        Past Medical History:  Past Medical History:   Diagnosis Date    Alzheimer's disease, unspecified (CODE)     Arthritis     Cataract     GERD (gastroesophageal reflux disease)     Heart attack 05/23/2022    Hypertension     Stroke        Past Surgical History:  Past Surgical History:   Procedure Laterality Date    ADENOIDECTOMY      ADRENAL GLAND SURGERY      APPENDECTOMY      BACK SURGERY      fusion l 4-5 s 1,2,3  fusion l 2-3    CORONARY ANGIOGRAPHY N/A 05/20/2022    Procedure: ANGIOGRAM, CORONARY ARTERY;  Surgeon: Domingo Martins MD;  Location: [facility] CATH LAB;  Service: Cardiology;  Laterality: N/A;    CORONARY ANGIOGRAPHY N/A 05/24/2022    Procedure: ANGIOGRAM, CORONARY ARTERY;  Surgeon: Domingo Martins MD;  Location: [facility] CATH LAB;  Service: Cardiology;  Laterality: N/A;    EYE SURGERY      HEMORRHOID SURGERY      HERNIA REPAIR      HYSTERECTOMY      partial    indirect lumbar decompression      percutaneous placement of interspinous extension blocker    LEFT HEART CATHETERIZATION Left 05/20/2022    Procedure: CATHETERIZATION, HEART, LEFT;  Surgeon: Domingo Martins MD;  Location: [facility] CATH LAB;  Service: Cardiology;  Laterality: Left;    TONSILLECTOMY           Family History:  Family History   Problem Relation Name Age of Onset    Heart disease Mother      Hypertension Mother      Diabetes Mother      Hypertension Father      Kidney disease Father      Breast cancer Sister         Social History:  Social History     Tobacco Use    Smoking status: Never    Smokeless tobacco: Never   Substance and Sexual Activity    Alcohol use: No    Drug use: No    Sexual activity: Not Currently        Review of Systems     Review of Systems    Unable to perform ROS: Dementia   Musculoskeletal:  Positive for back pain (lower back) and myalgias (BLE pain).   Psychiatric/Behavioral:  Positive for agitation and behavioral problems. Negative for hallucinations, self-injury and suicidal ideas. The patient is nervous/anxious.       Physical Exam     Initial Vitals [09/12/24 1723]   BP Pulse Resp Temp SpO2   136/67 74 18 97.7 °F (36.5 °C) 95 %      MAP       --          Physical Exam  Nursing Notes and Vital Signs Reviewed.  Constitutional: Patient is in no acute distress. Well-developed and well-nourished.  Head: Atraumatic. Normocephalic.  Eyes: PERRL. EOM intact. Conjunctivae are not pale. No scleral icterus.  ENT: Mucous membranes are moist. Oropharynx is clear and symmetric.    Neck: Supple. Full ROM. No lymphadenopathy.  Cardiovascular: Regular rate. Regular rhythm. No murmurs, rubs, or gallops. Distal pulses are 2+ and symmetric.  Pulmonary/Chest: No respiratory distress. Clear to auscultation bilaterally. No wheezing or rales.  Abdominal: Soft and non-distended.  There is no tenderness.  No rebound, guarding, or rigidity. Good bowel sounds.  Genitourinary: No CVA tenderness  Musculoskeletal: Moves all extremities. Severe arthritis to right leg. No edema. No calf tenderness.  Skin: Warm and dry.  Neurological:  Alert, awake, and appropriate.  Normal speech.  No acute focal neurological deficits are appreciated.  Psychiatric: Anxious. Agitated. Aggressive and verbally confrontational to family at bedside. Alert and oriented to person and place. Crying. Tearful     ED Course   Procedures  ED Vital Signs:  Vitals:    09/12/24 1723 09/12/24 1725 09/12/24 1919   BP: 136/67     Pulse: 74     Resp: 18  20   Temp: 97.7 °F (36.5 °C)     TempSrc: Axillary     SpO2: 95%     Weight:  47.6 kg (104 lb 15 oz)        Abnormal Lab Results:  Labs Reviewed   CBC W/ AUTO DIFFERENTIAL - Abnormal       Result Value    WBC 9.61      RBC 4.56      Hemoglobin 13.8       Hematocrit 40.4      MCV 89      MCH 30.3      MCHC 34.2      RDW 14.0      Platelets 215      MPV 9.9      Immature Granulocytes 0.3      Gran # (ANC) 5.7      Immature Grans (Abs) 0.03      Lymph # 2.7      Mono # 1.1 (*)     Eos # 0.1      Baso # 0.06      nRBC 0      Gran % 58.9      Lymph % 27.8      Mono % 11.2      Eosinophil % 1.2      Basophil % 0.6      Differential Method Automated     COMPREHENSIVE METABOLIC PANEL - Abnormal    Sodium 143      Potassium 3.5      Chloride 108      CO2 20 (*)     Glucose 84      BUN 25 (*)     Creatinine 1.3      Calcium 9.9      Total Protein 6.9      Albumin 3.9      Total Bilirubin 0.8      Alkaline Phosphatase 75      AST 26      ALT 16      eGFR 40 (*)     Anion Gap 15     URINALYSIS, REFLEX TO URINE CULTURE - Abnormal    Specimen UA Urine, Clean Catch      Color, UA Yellow      Appearance, UA Clear      pH, UA 5.0      Specific Gravity, UA 1.010      Protein, UA Negative      Glucose, UA Negative      Ketones, UA Negative      Bilirubin (UA) Negative      Occult Blood UA Negative      Nitrite, UA Negative      Urobilinogen, UA Negative      Leukocytes, UA 2+ (*)     Narrative:     Specimen Source->Urine   DRUG SCREEN PANEL, URINE EMERGENCY - Abnormal    Benzodiazepines Negative      Methadone metabolites Presumptive Positive (*)     Cocaine (Metab.) Negative      Opiate Scrn, Ur Negative      Barbiturate Screen, Ur Negative      Amphetamine Screen, Ur Negative      THC Negative      Phencyclidine Negative      Creatinine, Urine 74.4      Toxicology Information SEE COMMENT      Narrative:     Specimen Source->Urine   ACETAMINOPHEN LEVEL - Abnormal    Acetaminophen (Tylenol), Serum <3.0 (*)    URINALYSIS MICROSCOPIC - Abnormal    RBC, UA 3      WBC, UA 8 (*)     WBC Clumps, UA Occasional (*)     Bacteria Occasional      Squam Epithel, UA 5      Hyaline Casts, UA 20 (*)     Microscopic Comment SEE COMMENT      Narrative:     Specimen Source->Urine   TSH    TSH 0.766      ALCOHOL,MEDICAL (ETHANOL)    Alcohol, Serum <10     SARS-COV-2 RNA AMPLIFICATION, QUAL    SARS-CoV-2 RNA, Amplification, Qual Negative          All Lab Results:  Results for orders placed or performed during the hospital encounter of 09/12/24   CBC auto differential   Result Value Ref Range    WBC 9.61 3.90 - 12.70 K/uL    RBC 4.56 4.00 - 5.40 M/uL    Hemoglobin 13.8 12.0 - 16.0 g/dL    Hematocrit 40.4 37.0 - 48.5 %    MCV 89 82 - 98 fL    MCH 30.3 27.0 - 31.0 pg    MCHC 34.2 32.0 - 36.0 g/dL    RDW 14.0 11.5 - 14.5 %    Platelets 215 150 - 450 K/uL    MPV 9.9 9.2 - 12.9 fL    Immature Granulocytes 0.3 0.0 - 0.5 %    Gran # (ANC) 5.7 1.8 - 7.7 K/uL    Immature Grans (Abs) 0.03 0.00 - 0.04 K/uL    Lymph # 2.7 1.0 - 4.8 K/uL    Mono # 1.1 (H) 0.3 - 1.0 K/uL    Eos # 0.1 0.0 - 0.5 K/uL    Baso # 0.06 0.00 - 0.20 K/uL    nRBC 0 0 /100 WBC    Gran % 58.9 38.0 - 73.0 %    Lymph % 27.8 18.0 - 48.0 %    Mono % 11.2 4.0 - 15.0 %    Eosinophil % 1.2 0.0 - 8.0 %    Basophil % 0.6 0.0 - 1.9 %    Differential Method Automated    Comprehensive metabolic panel   Result Value Ref Range    Sodium 143 136 - 145 mmol/L    Potassium 3.5 3.5 - 5.1 mmol/L    Chloride 108 95 - 110 mmol/L    CO2 20 (L) 23 - 29 mmol/L    Glucose 84 70 - 110 mg/dL    BUN 25 (H) 8 - 23 mg/dL    Creatinine 1.3 0.5 - 1.4 mg/dL    Calcium 9.9 8.7 - 10.5 mg/dL    Total Protein 6.9 6.0 - 8.4 g/dL    Albumin 3.9 3.5 - 5.2 g/dL    Total Bilirubin 0.8 0.1 - 1.0 mg/dL    Alkaline Phosphatase 75 55 - 135 U/L    AST 26 10 - 40 U/L    ALT 16 10 - 44 U/L    eGFR 40 (A) >60 mL/min/1.73 m^2    Anion Gap 15 8 - 16 mmol/L   TSH   Result Value Ref Range    TSH 0.766 0.400 - 4.000 uIU/mL   Urinalysis, Reflex to Urine Culture Urine, Clean Catch    Specimen: Urine   Result Value Ref Range    Specimen UA Urine, Clean Catch     Color, UA Yellow Yellow, Straw, Izabela    Appearance, UA Clear Clear    pH, UA 5.0 5.0 - 8.0    Specific Gravity, UA 1.010 1.005 - 1.030    Protein, UA  Negative Negative    Glucose, UA Negative Negative    Ketones, UA Negative Negative    Bilirubin (UA) Negative Negative    Occult Blood UA Negative Negative    Nitrite, UA Negative Negative    Urobilinogen, UA Negative <2.0 EU/dL    Leukocytes, UA 2+ (A) Negative   Drug screen panel, emergency   Result Value Ref Range    Benzodiazepines Negative Negative    Methadone metabolites Presumptive Positive (A) Negative    Cocaine (Metab.) Negative Negative    Opiate Scrn, Ur Negative Negative    Barbiturate Screen, Ur Negative Negative    Amphetamine Screen, Ur Negative Negative    THC Negative Negative    Phencyclidine Negative Negative    Creatinine, Urine 74.4 15.0 - 325.0 mg/dL    Toxicology Information SEE COMMENT    Ethanol   Result Value Ref Range    Alcohol, Serum <10 <10 mg/dL   Acetaminophen level   Result Value Ref Range    Acetaminophen (Tylenol), Serum <3.0 (L) 10.0 - 20.0 ug/mL   COVID-19 Rapid Screening   Result Value Ref Range    SARS-CoV-2 RNA, Amplification, Qual Negative Negative   Urinalysis Microscopic   Result Value Ref Range    RBC, UA 3 0 - 4 /hpf    WBC, UA 8 (H) 0 - 5 /hpf    WBC Clumps, UA Occasional (A) None-Rare    Bacteria Occasional None-Occ /hpf    Squam Epithel, UA 5 /hpf    Hyaline Casts, UA 20 (A) 0-1/lpf /lpf    Microscopic Comment SEE COMMENT          Imaging Results:  Imaging Results    None                   The Emergency Provider reviewed the vital signs and test results, which are outlined above.     ED Discussion     7:10 PM: The PEC hold has been issued by Dr. Allison at this time for dangerous to self, gravely disabled, and unable to seek voluntary admission.    8:56 PM: Pt has been medically cleared by Dr. Allison at this time. Reassessed pt at this time. Pt is resting comfortably and appears in no acute distress. There are no psychiatric services offered at this facility. D/w pt all pertinent ED information and plan to transfer to psychiatric facility for psychiatric treatment.  Pt verbalizes understanding. Patient being transferred by [facility] for ongoing personal protection en route. Pt has been made aware of all risks and benefits associated with transfer, including but not limited to death, MVC, loss of vital signs, and/or permanent disability. Benefits include ability to be treated at an inpatient psychiatric facility. Pt will be transported by personnel trained in CPR and CPI. Patient understands that there could be unforeseen motor vehicle accidents, inclement weather, or loss of vital signs that could result in potential death or permanent disability. All questions and complaints have been addressed at this time. Pt condition is stable at this time and is clear to transfer to psychiatric facility at this time.     9:55 PM: Discussed pt's case with Dr. Glover (Telepsychiatry) who agrees with recommendation for geropsychiatry admission due to pt escalating behavior and dementia.         Medical Decision Making  83 y/o female here agitated and crying after reportedly trying to jump out of vehicle while being transported to doctors appt this afternoon .     Amount and/or Complexity of Data Reviewed  Independent Historian: caregiver     Details: Daughter   Labs: ordered. Decision-making details documented in ED Course.    Risk  Prescription drug management.       Additional MDM:   Psych: A Physician Emergency Certificate (PEC) was done in the ED for: Suicidal and Gravely Disabled. The patient has been medically cleared. Outcome: The patient was approved for transfer to another facility.           ED Medication(s):  Medications   cefTRIAXone (Rocephin) 1 g in D5W 100 mL IVPB (MB+) (1 g Intravenous New Bag 9/12/24 2128)   HYDROcodone-acetaminophen 5-325 mg per tablet 1 tablet (1 tablet Oral Given 9/12/24 1919)       New Prescriptions    CEFUROXIME (CEFTIN) 250 MG TABLET    Take 1 tablet (250 mg total) by mouth every 12 (twelve) hours. for 7 days               Scribe Attestation:   Scribe #1: I  performed the above scribed service and the documentation accurately describes the services I performed. I attest to the accuracy of the note.     Attending:   Physician Attestation Statement for Scribe #1: I, Kaiser Allison Jr., MD, personally performed the services described in this documentation, as scribed by Rony Scherer, in my presence, and it is both accurate and complete.           Clinical Impression       ICD-10-CM ICD-9-CM   1. Agitation due to dementia  F03.911 294.21   2. Homicidal ideation  R45.850 V62.85   3. Acute cystitis without hematuria  N30.00 595.0       Disposition:   Disposition: Transferred  Condition: Stable         Kaiser Allison Jr., MD  09/12/24 5746       Kaiser Allison Jr., MD  09/12/24 7105

## 2024-09-13 NOTE — ED NOTES
Patients Daughter Mis contact updated in chart and would like to be updated when patient has placement to facility. Patients daughter left facility with patients belongings in bag.

## 2024-09-13 NOTE — ED NOTES
Patient notified that she is under PEC. Patient verbalized understanding and states she wants help.

## 2024-09-23 ENCOUNTER — PATIENT MESSAGE (OUTPATIENT)
Dept: CARDIOLOGY | Facility: HOSPITAL | Age: 84
End: 2024-09-23
Payer: MEDICARE

## 2024-09-25 ENCOUNTER — PATIENT MESSAGE (OUTPATIENT)
Dept: CARDIOLOGY | Facility: HOSPITAL | Age: 84
End: 2024-09-25
Payer: MEDICARE

## 2024-10-10 ENCOUNTER — TELEPHONE (OUTPATIENT)
Dept: ORTHOPEDICS | Facility: CLINIC | Age: 84
End: 2024-10-10
Payer: MEDICARE

## 2024-10-10 NOTE — TELEPHONE ENCOUNTER
Returned Ariane phone call in regards to the patient. I got the patient schedule to see Dr. Lopez on 10/16/24 at 12:00. Verbalized understanding.

## 2024-10-10 NOTE — TELEPHONE ENCOUNTER
----- Message from Manju sent at 10/10/2024 10:38 AM CDT -----  Contact: patient granddaughter  Type:  Needs Medical Advice    Who Called: Ariane  Symptoms (please be specific):  knee swelling    How long has patient had these symptoms:  she is not sure   Would the patient rather a call back or a response via [facility]? Call   Best Call Back Number:  [phone]   Additional Information:

## 2024-10-15 ENCOUNTER — HOSPITAL ENCOUNTER (EMERGENCY)
Facility: HOSPITAL | Age: 84
Discharge: PSYCHIATRIC OR SUBSTANCE USE FACILITY | End: 2024-10-16
Attending: EMERGENCY MEDICINE
Payer: MEDICARE

## 2024-10-15 DIAGNOSIS — R07.9 CHEST PAIN: ICD-10-CM

## 2024-10-15 DIAGNOSIS — F03.90 DEMENTIA, UNSPECIFIED DEMENTIA SEVERITY, UNSPECIFIED DEMENTIA TYPE, UNSPECIFIED WHETHER BEHAVIORAL, PSYCHOTIC, OR MOOD DISTURBANCE OR ANXIETY: Primary | ICD-10-CM

## 2024-10-15 LAB
ALBUMIN SERPL BCP-MCNC: 3.5 G/DL (ref 3.5–5.2)
ALP SERPL-CCNC: 84 U/L (ref 55–135)
ALT SERPL W/O P-5'-P-CCNC: 19 U/L (ref 10–44)
AMPHET+METHAMPHET UR QL: NEGATIVE
AMPHET+METHAMPHET UR QL: NEGATIVE
ANION GAP SERPL CALC-SCNC: 11 MMOL/L (ref 8–16)
APAP SERPL-MCNC: <3 UG/ML (ref 10–20)
AST SERPL-CCNC: 26 U/L (ref 10–40)
BARBITURATES UR QL SCN>200 NG/ML: NEGATIVE
BARBITURATES UR QL SCN>200 NG/ML: NEGATIVE
BASOPHILS # BLD AUTO: 0.05 K/UL (ref 0–0.2)
BASOPHILS NFR BLD: 0.7 % (ref 0–1.9)
BENZODIAZ UR QL SCN>200 NG/ML: NEGATIVE
BENZODIAZ UR QL SCN>200 NG/ML: NEGATIVE
BILIRUB SERPL-MCNC: 0.6 MG/DL (ref 0.1–1)
BILIRUB UR QL STRIP: NEGATIVE
BNP SERPL-MCNC: 51 PG/ML (ref 0–99)
BUN SERPL-MCNC: 34 MG/DL (ref 8–23)
BZE UR QL SCN: NEGATIVE
BZE UR QL SCN: NEGATIVE
CALCIUM SERPL-MCNC: 9.9 MG/DL (ref 8.7–10.5)
CANNABINOIDS UR QL SCN: NEGATIVE
CANNABINOIDS UR QL SCN: NEGATIVE
CHLORIDE SERPL-SCNC: 114 MMOL/L (ref 95–110)
CLARITY UR: CLEAR
CO2 SERPL-SCNC: 21 MMOL/L (ref 23–29)
COLOR UR: YELLOW
CREAT SERPL-MCNC: 0.9 MG/DL (ref 0.5–1.4)
CREAT UR-MCNC: 77.7 MG/DL (ref 15–325)
CREAT UR-MCNC: 77.7 MG/DL (ref 15–325)
DIFFERENTIAL METHOD BLD: ABNORMAL
EOSINOPHIL # BLD AUTO: 0.2 K/UL (ref 0–0.5)
EOSINOPHIL NFR BLD: 2.9 % (ref 0–8)
ERYTHROCYTE [DISTWIDTH] IN BLOOD BY AUTOMATED COUNT: 14.1 % (ref 11.5–14.5)
EST. GFR  (NO RACE VARIABLE): >60 ML/MIN/1.73 M^2
ETHANOL SERPL-MCNC: <10 MG/DL
GLUCOSE SERPL-MCNC: 99 MG/DL (ref 70–110)
GLUCOSE UR QL STRIP: NEGATIVE
HCT VFR BLD AUTO: 38.4 % (ref 37–48.5)
HGB BLD-MCNC: 12.5 G/DL (ref 12–16)
HGB UR QL STRIP: NEGATIVE
IMM GRANULOCYTES # BLD AUTO: 0.05 K/UL (ref 0–0.04)
IMM GRANULOCYTES NFR BLD AUTO: 0.7 % (ref 0–0.5)
KETONES UR QL STRIP: ABNORMAL
LEUKOCYTE ESTERASE UR QL STRIP: NEGATIVE
LIPASE SERPL-CCNC: 41 U/L (ref 4–60)
LYMPHOCYTES # BLD AUTO: 1.8 K/UL (ref 1–4.8)
LYMPHOCYTES NFR BLD: 25.5 % (ref 18–48)
MCH RBC QN AUTO: 29.8 PG (ref 27–31)
MCHC RBC AUTO-ENTMCNC: 32.6 G/DL (ref 32–36)
MCV RBC AUTO: 91 FL (ref 82–98)
METHADONE UR QL SCN>300 NG/ML: NEGATIVE
METHADONE UR QL SCN>300 NG/ML: NEGATIVE
MONOCYTES # BLD AUTO: 0.7 K/UL (ref 0.3–1)
MONOCYTES NFR BLD: 10.6 % (ref 4–15)
NEUTROPHILS # BLD AUTO: 4.1 K/UL (ref 1.8–7.7)
NEUTROPHILS NFR BLD: 59.6 % (ref 38–73)
NITRITE UR QL STRIP: NEGATIVE
NRBC BLD-RTO: 0 /100 WBC
OHS QRS DURATION: 74 MS
OHS QTC CALCULATION: 421 MS
OPIATES UR QL SCN: NEGATIVE
OPIATES UR QL SCN: NEGATIVE
PCP UR QL SCN>25 NG/ML: NEGATIVE
PCP UR QL SCN>25 NG/ML: NEGATIVE
PH UR STRIP: 6 [PH] (ref 5–8)
PLATELET # BLD AUTO: 213 K/UL (ref 150–450)
PMV BLD AUTO: 9.3 FL (ref 9.2–12.9)
POTASSIUM SERPL-SCNC: 4.1 MMOL/L (ref 3.5–5.1)
PROT SERPL-MCNC: 6.6 G/DL (ref 6–8.4)
PROT UR QL STRIP: NEGATIVE
RBC # BLD AUTO: 4.2 M/UL (ref 4–5.4)
SODIUM SERPL-SCNC: 146 MMOL/L (ref 136–145)
SP GR UR STRIP: 1.02 (ref 1–1.03)
TOXICOLOGY INFORMATION: NORMAL
TOXICOLOGY INFORMATION: NORMAL
TROPONIN I SERPL DL<=0.01 NG/ML-MCNC: <0.006 NG/ML (ref 0–0.03)
TROPONIN I SERPL DL<=0.01 NG/ML-MCNC: <0.006 NG/ML (ref 0–0.03)
TSH SERPL DL<=0.005 MIU/L-ACNC: 0.77 UIU/ML (ref 0.4–4)
TSH SERPL DL<=0.005 MIU/L-ACNC: 0.77 UIU/ML (ref 0.4–4)
URN SPEC COLLECT METH UR: ABNORMAL
UROBILINOGEN UR STRIP-ACNC: NEGATIVE EU/DL
WBC # BLD AUTO: 6.86 K/UL (ref 3.9–12.7)

## 2024-10-15 PROCEDURE — 93010 ELECTROCARDIOGRAM REPORT: CPT | Mod: ,,, | Performed by: INTERNAL MEDICINE

## 2024-10-15 PROCEDURE — 93005 ELECTROCARDIOGRAM TRACING: CPT

## 2024-10-15 PROCEDURE — 84443 ASSAY THYROID STIM HORMONE: CPT | Performed by: EMERGENCY MEDICINE

## 2024-10-15 PROCEDURE — 83690 ASSAY OF LIPASE: CPT | Performed by: EMERGENCY MEDICINE

## 2024-10-15 PROCEDURE — 85025 COMPLETE CBC W/AUTO DIFF WBC: CPT | Performed by: EMERGENCY MEDICINE

## 2024-10-15 PROCEDURE — 81003 URINALYSIS AUTO W/O SCOPE: CPT | Mod: 59 | Performed by: EMERGENCY MEDICINE

## 2024-10-15 PROCEDURE — 36415 COLL VENOUS BLD VENIPUNCTURE: CPT | Performed by: EMERGENCY MEDICINE

## 2024-10-15 PROCEDURE — 80053 COMPREHEN METABOLIC PANEL: CPT | Performed by: EMERGENCY MEDICINE

## 2024-10-15 PROCEDURE — 82077 ASSAY SPEC XCP UR&BREATH IA: CPT | Performed by: EMERGENCY MEDICINE

## 2024-10-15 PROCEDURE — 80143 DRUG ASSAY ACETAMINOPHEN: CPT | Performed by: EMERGENCY MEDICINE

## 2024-10-15 PROCEDURE — 99285 EMERGENCY DEPT VISIT HI MDM: CPT | Mod: 25

## 2024-10-15 PROCEDURE — 83880 ASSAY OF NATRIURETIC PEPTIDE: CPT | Performed by: EMERGENCY MEDICINE

## 2024-10-15 PROCEDURE — 80307 DRUG TEST PRSMV CHEM ANLYZR: CPT | Performed by: EMERGENCY MEDICINE

## 2024-10-15 PROCEDURE — 84484 ASSAY OF TROPONIN QUANT: CPT | Mod: 91 | Performed by: EMERGENCY MEDICINE

## 2024-10-15 PROCEDURE — 25000003 PHARM REV CODE 250: Performed by: EMERGENCY MEDICINE

## 2024-10-15 RX ORDER — LIDOCAINE HYDROCHLORIDE 20 MG/ML
15 SOLUTION OROPHARYNGEAL ONCE
Status: COMPLETED | OUTPATIENT
Start: 2024-10-15 | End: 2024-10-15

## 2024-10-15 RX ORDER — ALUMINUM HYDROXIDE, MAGNESIUM HYDROXIDE, AND SIMETHICONE 1200; 120; 1200 MG/30ML; MG/30ML; MG/30ML
30 SUSPENSION ORAL ONCE
Status: COMPLETED | OUTPATIENT
Start: 2024-10-15 | End: 2024-10-15

## 2024-10-15 RX ORDER — METOPROLOL TARTRATE 25 MG/1
25 TABLET, FILM COATED ORAL
COMMUNITY
Start: 2024-10-08

## 2024-10-15 RX ADMIN — ALUMINUM HYDROXIDE, MAGNESIUM HYDROXIDE, AND DIMETHICONE 30 ML: 200; 20; 200 SUSPENSION ORAL at 05:10

## 2024-10-15 RX ADMIN — LIDOCAINE HYDROCHLORIDE 15 ML: 20 SOLUTION ORAL at 05:10
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
## 2024-10-15 NOTE — ED NOTES
Attempted to notify daughter, Mis, no answer. Spoke with patient Granddaughter, Ariane at patients request. Notified that patient is in the Emergency Department.

## 2024-10-16 ENCOUNTER — TELEPHONE (OUTPATIENT)
Dept: EMERGENCY MEDICINE | Facility: HOSPITAL | Age: 84
End: 2024-10-16
Payer: MEDICARE

## 2024-10-16 VITALS
TEMPERATURE: 98 F | DIASTOLIC BLOOD PRESSURE: 73 MMHG | HEART RATE: 65 BPM | BODY MASS INDEX: 19.65 KG/M2 | SYSTOLIC BLOOD PRESSURE: 139 MMHG | RESPIRATION RATE: 20 BRPM | OXYGEN SATURATION: 97 % | WEIGHT: 104 LBS

## 2024-10-16 NOTE — ED NOTES
Patient Belongings: Purple shirt, brown pants, tennis shoes, and a walker. Pt belonging secured with sitter

## 2024-11-08 ENCOUNTER — OFFICE VISIT (OUTPATIENT)
Dept: PRIMARY CARE CLINIC | Facility: CLINIC | Age: 84
End: 2024-11-08
Payer: MEDICARE

## 2024-11-08 VITALS
OXYGEN SATURATION: 98 % | DIASTOLIC BLOOD PRESSURE: 60 MMHG | HEIGHT: 61 IN | TEMPERATURE: 97 F | SYSTOLIC BLOOD PRESSURE: 132 MMHG | WEIGHT: 132.5 LBS | BODY MASS INDEX: 25.02 KG/M2 | HEART RATE: 62 BPM

## 2024-11-08 DIAGNOSIS — Z09 HOSPITAL DISCHARGE FOLLOW-UP: Primary | ICD-10-CM

## 2024-11-08 DIAGNOSIS — Z23 NEED FOR VACCINATION: ICD-10-CM

## 2024-11-08 DIAGNOSIS — Z91.81 AT RISK FOR FALLS: ICD-10-CM

## 2024-11-08 DIAGNOSIS — I69.354 HEMIPLEGIA AND HEMIPARESIS FOLLOWING CEREBRAL INFARCTION AFFECTING LEFT NON-DOMINANT SIDE: ICD-10-CM

## 2024-11-08 DIAGNOSIS — F01.B4 MODERATE VASCULAR DEMENTIA WITH ANXIETY: ICD-10-CM

## 2024-11-08 PROCEDURE — 99999 PR PBB SHADOW E&M-EST. PATIENT-LVL V: CPT | Mod: PBBFAC,,, | Performed by: FAMILY MEDICINE

## 2024-11-08 PROCEDURE — 99215 OFFICE O/P EST HI 40 MIN: CPT | Mod: PBBFAC,PN | Performed by: FAMILY MEDICINE

## 2024-11-08 RX ORDER — OLANZAPINE 5 MG/1
5 TABLET ORAL NIGHTLY
COMMUNITY

## 2024-11-08 RX ORDER — DONEPEZIL HYDROCHLORIDE 10 MG/1
10 TABLET, FILM COATED ORAL
COMMUNITY
Start: 2024-10-29

## 2024-11-08 RX ORDER — FLUOXETINE HYDROCHLORIDE 20 MG/1
20 CAPSULE ORAL
COMMUNITY
Start: 2024-10-29

## 2024-11-08 NOTE — PROGRESS NOTES
Leslie Wood  11/08/2024  [id number]    Natalia Gomez MD  Patient Care Team:  Natalia Gomez MD as PCP - General (Family Medicine)  Elva Israel MD as Consulting Physician (Pain Medicine)  Daniel Bonilla MD (Ophthalmology)  Viki Ruiz PA-C as Physician Assistant (Internal Medicine)  Urvashi Valle as ED Navigator  Vidya Serrato, RN as Registered Nurse  Future Appointments       Date Provider Specialty Appt Notes    11/11/2024 Luis Ibarra [place] [place] knee zilretta expires: 12/31/24(R knee zilretta 7/25/24 - R knee monovisc 05/19/23, toradol injection 07/25/24,LOV 07/25/24, X-Ray 01/25/24)    12/12/2024 Natalia Gomez MD Primary Care 1 month f/u    3/18/2025 Domingo Martins MD Cardiology 6 month             Chief Complaint:  Chief Complaint   Patient presents with    Hospital Follow Up     10 day f/u        History of Present Illness:     Hospital follow up, here with daughter SANTOS Abebe and primary caretaker.   Several visits to geriatric behavioral unit, last discharged from [facility] [place] 10 days ago, stayed 10 days. For disruptive behavior w psychotic features related to dementia. Both Mis and pt reports no issues at the moment. Calm. Eats sleeps well. Got on zyprexa. Just that legs are wobbly. No falls.     Recent Fall:  []Yes  [x]No  Activity:  []Vigorous []Moderate [x]Sedentary  Appetite:  [x]Good  []Fair  []Poor  Mood: [x]Stable []Anxious []Depressed   Insomnia: []Yes  [x]No  Weight stable [x]Yes  []No  Bowel/bladder control [x]Yes  []No    Review of Systems   Respiratory:  Negative for shortness of breath.    Cardiovascular:  Negative for chest pain and leg swelling.   Gastrointestinal:  Negative for diarrhea (loose stools 2-3/day recent. imodium helps).   Psychiatric/Behavioral:  Positive for confusion. Negative for agitation, behavioral problems, depressed mood, dysphoric mood, hallucinations, sleep disturbance and suicidal ideas. The patient is  not nervous/anxious and is not hyperactive.       See HPI above  PHQ-4 Score: 0    Exam:  Vitals:    11/08/24 1549   BP: 132/60   Pulse: 62   Temp: 97.2 °F (36.2 °C)     Weight: 60.1 kg (132 lb 7.9 oz)   Body mass index is 25.03 kg/m².    BP Readings from Last 3 Encounters:   11/08/24 132/60   10/16/24 139/73   09/13/24 (!) 155/74        Wt Readings from Last 3 Encounters:   11/08/24 1549 60.1 kg (132 lb 7.9 oz)   10/15/24 1542 47.2 kg (104 lb)   09/12/24 1725 47.6 kg (104 lb 15 oz)        Physical Exam  Vitals and nursing note reviewed.   Constitutional:       General: She is not in acute distress.     Appearance: She is not toxic-appearing.      Comments: Calm and smiling. Using a walker   HENT:      Head: Normocephalic and atraumatic.   Eyes:      Extraocular Movements: Extraocular movements intact.      Pupils: Pupils are equal, round, and reactive to light.   Cardiovascular:      Rate and Rhythm: Normal rate and regular rhythm.      Pulses: Normal pulses.      Heart sounds: Normal heart sounds.   Pulmonary:      Effort: Pulmonary effort is normal.      Breath sounds: Normal breath sounds.   Musculoskeletal:         General: No swelling.   Skin:     General: Skin is warm.      Capillary Refill: Capillary refill takes less than 2 seconds.   Neurological:      Mental Status: She is oriented to person, place, and time. Mental status is at baseline.   Psychiatric:         Mood and Affect: Mood normal.          Laboratory Reviewed    Lab Results   Component Value Date    WBC 6.86 10/15/2024    HGB 12.5 10/15/2024    HCT 38.4 10/15/2024     10/15/2024    CHOL 128 04/03/2024    TRIG 78 04/03/2024    HDL 46 04/03/2024    ALT 19 10/15/2024    AST 26 10/15/2024     (H) 10/15/2024    K 4.1 10/15/2024     (H) 10/15/2024    CREATININE 0.9 10/15/2024    BUN 34 (H) 10/15/2024    CO2 21 (L) 10/15/2024    TSH 0.768 10/15/2024    TSH 0.768 10/15/2024    INR 1.1 07/30/2024    HGBA1C 5.6 08/01/2024         Health  Maintenance  Health Maintenance Topics with due status: Not Due       Topic Last Completion Date    TETANUS VACCINE 06/24/2019    Lipid Panel 04/03/2024    Hemoglobin A1c 08/01/2024     Health Maintenance Due   Topic Date Due    Shingles Vaccine (1 of 2) Never done    RSV Vaccine (Age 60+ and Pregnant patients) (1 - 1-dose 75+ series) Never done    Influenza Vaccine (1) 09/01/2024    COVID-19 Vaccine (7 - 2024-25 season) 09/01/2024       Assessment and Plan   84 y.o. female with multiple co-morbid illnesses here for continued follow up of medical problems.      The primary encounter diagnosis was Hospital discharge follow-up. Diagnoses of At risk for falls, Moderate vascular dementia with anxiety, and Hemiplegia and hemiparesis following cerebral infarction affecting left non-dominant side were also pertinent to this visit.  1. Hospital discharge follow-up    2. At risk for falls  -     Ambulatory referral/consult to Physical/Occupational Therapy; Future; Expected date: 11/15/2024    3. Moderate vascular dementia with anxiety  Overview:  On olanzapine 5, fluoxetine 20,   Memantine 5, donepezil 10    Assessment & Plan:  Calm, no issues      4. Hemiplegia and hemiparesis following cerebral infarction affecting left non-dominant side  -     Ambulatory referral/consult to Physical/Occupational Therapy; Future; Expected date: 11/15/2024        Health Maintenance         Date Due Completion Date    Shingles Vaccine (1 of 2) Never done ---    RSV Vaccine (Age 60+ and Pregnant patients) (1 - 1-dose 75+ series) Never done ---    Influenza Vaccine (1) 09/01/2024 11/2/2023    COVID-19 Vaccine (7 - 2024-25 season) 09/01/2024 12/31/2021    Hemoglobin A1c 02/01/2025 8/1/2024    Lipid Panel 04/03/2025 4/3/2024    TETANUS VACCINE 06/24/2029 6/24/2019            Discharge instructions          Follow up: Follow up in about 1 month (around 12/8/2024).

## 2024-11-08 NOTE — PATIENT INSTRUCTIONS
If you are feeling unwell, we'd like to be the first ones to know here at [facility]! Please give us a call. Same day appointments are our top priority to keep you well and out of the emergency rooms and hospitals. Call [phone] for our direct line. After hours advice is always available. Please call [phone] after hours to speak to the on-call team.

## 2024-11-11 ENCOUNTER — OFFICE VISIT (OUTPATIENT)
Dept: ORTHOPEDICS | Facility: CLINIC | Age: 84
End: 2024-11-11
Payer: MEDICARE

## 2024-11-11 VITALS — WEIGHT: 132.5 LBS | BODY MASS INDEX: 25.02 KG/M2 | HEIGHT: 61 IN

## 2024-11-11 DIAGNOSIS — Z98.1 HISTORY OF LUMBAR FUSION: ICD-10-CM

## 2024-11-11 DIAGNOSIS — M17.11 ARTHRITIS OF KNEE, RIGHT: Primary | ICD-10-CM

## 2024-11-11 DIAGNOSIS — M23.51 RECURRENT RIGHT KNEE INSTABILITY: ICD-10-CM

## 2024-11-11 DIAGNOSIS — M17.11 PRIMARY OSTEOARTHRITIS OF RIGHT KNEE: Primary | ICD-10-CM

## 2024-11-11 PROCEDURE — 99999PBSHW PR PBB SHADOW TECHNICAL ONLY FILED TO HB: Mod: JZ,PBBFAC,,

## 2024-11-11 PROCEDURE — 20610 DRAIN/INJ JOINT/BURSA W/O US: CPT | Mod: PBBFAC | Performed by: ORTHOPAEDIC SURGERY

## 2024-11-11 PROCEDURE — 99999 PR PBB SHADOW E&M-EST. PATIENT-LVL IV: CPT | Mod: PBBFAC,,, | Performed by: ORTHOPAEDIC SURGERY

## 2024-11-11 PROCEDURE — 99214 OFFICE O/P EST MOD 30 MIN: CPT | Mod: PBBFAC | Performed by: ORTHOPAEDIC SURGERY

## 2024-11-11 PROCEDURE — 99213 OFFICE O/P EST LOW 20 MIN: CPT | Mod: 25,S$PBB,, | Performed by: ORTHOPAEDIC SURGERY

## 2024-11-11 RX ORDER — MEMANTINE HYDROCHLORIDE 10 MG/1
10 TABLET ORAL 2 TIMES DAILY
COMMUNITY
Start: 2024-10-29

## 2024-11-11 RX ADMIN — TRIAMCINOLONE ACETONIDE EXTENDED-RELEASE INJECTABLE SUSPENSION 32 MG: KIT INTRA-ARTICULAR at 09:11

## 2024-11-11 NOTE — PROCEDURES
Large Joint Aspiration/Injection: R knee    Date/Time: 11/11/2024 9:20 AM    Performed by: Luis Ibarra MD  Authorized by: Luis Ibarra MD    Consent Done?:  Yes (Verbal)  Indications:  Arthritis and pain  Site marked: the procedure site was marked    Timeout: prior to procedure the correct patient, procedure, and site was verified    Prep: patient was prepped and draped in usual sterile fashion    Local anesthesia used?: No    Local anesthetic:  Topical anesthetic    Details:  Needle Size:  22 G  Ultrasonic Guidance for needle placement?: No    Approach:  Anterolateral  Location:  Knee  Site:  R knee  Medications:  32 mg triamcinolone acetonide 32 mg  Patient tolerance:  Patient tolerated the procedure well with no immediate complications     Right Bryan

## 2024-11-11 NOTE — PROGRESS NOTES
Subjective:     Patient ID: Leslie Wood is a 84 y.o. female.    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  She had a CT scan which I reviewed of her head that showed that she had a stroke and we do not think that she is a candidate for total knee replacement due to medical conditions.  I did tell him anesthesia might bring her level of function to 1 level lower than when she was preop and we should avoid it at this time.  She needs to recuperate completely from her stroke.  Her neurologist at told what ever she gets after 1 year in of therapy is there is what she is going to get.  She is going to physical therapy at the [facility] and they want something about her knee if they can strengthen that.  She does have a brace for her back that she wears but not for her knee.  She uses a walker to get around.  She is here with her granddaughter and we discussed natural products.  She knows she cannot take NSAIDs because she is on Plavix and will injure her stomach and history of GERD the.  She needs to take natural with stuff.  In the future I did tell her steroid might be the only option for her in may end up being prednisone pills when time comes in the future   She denies loss of bowel bladder control she is alert today and oriented and answering questions appropriately  Granddaughter is here with her helping.      05/19/2023   Severe right knee pain   Scratched by a dog on the left calf that is painful   Patient stated that the right knee Depo-Medrol injection given 03/24/2023 wore out 2 weeks ago.  She is willing to proceed with viscosupplementation since we are avoiding having surgery at all cost.  She stated the dog scratched her and ran into her and caused some bruising and worried about an area that seems  to be hard and inflamed.  She is using her Rollator to get around.  She is not a surgical candidate due to medical issues.  She can not take NSAIDs she is on Plavix and history of GERD   She is here with granddaughter very pleasant alert oriented telling jokes.    07/06/2023   Severe right knee arthritis  Cellulitis by a dog scratch which we treated last time with antibiotics and that is completely healed  The severe right knee arthritis we can do much said trying different injections and she is going to physical therapy who they recommended maybe bracing.  I did tell her skin is too thin and the brace might rub and create an issue.  She is using a walker to walk around she does see pain management including doctors [facility]  Complaining of severe pain to the back which I told her I can not help her with in her pain is 8/10.  The knee nenita and subluxes and she has pain throughout most pronounced right now on the anterolateral aspect where she points at.  I reviewed her medications and her medical conditions with her and her granddaughter  No fever no chills no shortness of breath no difficulty with chewing swallowing loss of bowel bladder control or blurry vision double vision loss sense smell or taste      10/09/2023   Right knee severe arthritis  The cellulitis from a dog scratch all resolved   Seen recently by Cardiology Dr. Martins and she was told if she wants to have surgery she can.  She is thinking about not be able to tolerate the pain she is in right now.  There is injections they all lot last maybe 2 weeks.  The last Kenalog injection barely lasted 2 weeks.  She is looking that maybe she can have her surgery we discussed it today with granddaughter and the patient and showed her on the model and showed her x-rays of total knees.  I did tell her it takes roughly 3-6 months for complete healing to occur.  We have tried almost everything except Zilretta.  We will see if that  helps long enough so we can avoid surgery.  Patient wants to have her surgery done but would like to still try 1 more thing prior to undergoing surgery.      No fever no chills no shortness of breath.  She uses a Rollator to walk around      10/23/2023   Right knee severe arthritis  The cellulitis resolved from dog scratch  Today she is wearing a brace and she states that could slide down and she has to tighten it up a little bit.  I did tell her the wrap around brace is much easier to put on than the pull up braces.  She is using the walker to get around.  The neurologist saw her in wants a to undergo OT and PT as well as speech therapy she is about to start that   The injection given last time did not seem to last too long despite the fact she states her pain is 0/10   We will give Zilretta today since she change her mind about having surgery/right TKA.  I did tell her some of her pains are coming from her back she does have lumbar fusion and nerve stimulator in the lumbar spine   She walks with a Rollator  She is here with her daughter and granddaughter    01/25/2024   Right knee severe arthritis  The cellulitis from the dog scratched has resolved   She stated Zilretta seems to work really well for her.  She finally got better until a week ago.  Now her pain is 8/10.  She very rarely takes any Tylenol.  She has using a Rollator to get around.  She is going to physical therapy and dry needling seems to help  No fever no chills no shortness of breath  She is here with family    04/25/2024   Cellulitis from the dog scratched completely resolved   She had fallen tripping over a curb few weeks ago but she only have a small abrasion on the right tibia.  It has not bothering her.    Right knee arthritis and seems to be the Zilretta helped significantly.  She said she started to hurt at this time at 2/10.  She would like to keep on receiving the injections and keep her walking.  She does use her Rollator.  She does have  a lumbar spine stimulator from previous lumbar fusion.  She is avoiding surgery at this time she is 83 years of age as long as injections help we will avoid TKA  No fever no chills no shortness of breath or difficulty with chewing swallowing loss of bowel bladder control blurry vision double vision loss sense smell taste  She had history of UTI couple weeks ago and finished her antibiotics    07/25/2024   Patient severe pain in the right knee and low back.  [facility] we did a small procedure on her recently.  Sometimes she gets confused.  Her granddaughter with her that takes care of her she is looking at maybe place her in a nursing home at this point.  She is getting slightly demented and then they will giving her too many medications got her confused even more.  She stating that her pain is 7 out of 10.  She has been having more weakness in the legs unable to walk.  She finished her physical therapy roughly 2 months ago.  Received Monovisc 05/19/2023 in right knee Zilretta 04/25/2024 which helped much better  No fever no chills no shortness of breath or difficulty with chewing swallowing loss of bowel bladder control   I reviewed CMP and creatinine 0.8 and BUN 16 it is all within normal limits  The pain is so severe she was crying I offered to add Toradol for pain control as an injection on top of her Zilretta   She is in a wheelchair today usually she walks.    11/11/2024  Patient with pain in the right knee around 10/10.  No pain on the left side despite having arthritis.  I reviewed the x-ray with her and her daughter showed that she has windswept deformity because she was saying that her right knee is going more out.  She is not wanting to undergo any surgical intervention.  The right knee was injected Monovisc 05/19/2023 followed by right knee Zilretta 07/25/2024 with Toradol injection.  She said it wore off approximately 3 weeks.  Her pain is 10/10.  She is using a  Rollator to get around.  No fever no chills no shortness of breath no difficulty with chewing or swallowing  Past Medical History:   Diagnosis Date    Alzheimer's disease, unspecified (CODE)     Arthritis     Cataract     GERD (gastroesophageal reflux disease)     Heart attack 05/23/2022    Hypertension     Stroke      Past Surgical History:   Procedure Laterality Date    ADENOIDECTOMY      ADRENAL GLAND SURGERY      APPENDECTOMY      BACK SURGERY      fusion l 4-5 s 1,2,3  fusion l 2-3    CORONARY ANGIOGRAPHY N/A 05/20/2022    Procedure: ANGIOGRAM, CORONARY ARTERY;  Surgeon: Domingo Martins MD;  Location: [facility] CATH LAB;  Service: Cardiology;  Laterality: N/A;    CORONARY ANGIOGRAPHY N/A 05/24/2022    Procedure: ANGIOGRAM, CORONARY ARTERY;  Surgeon: Domingo Martins MD;  Location: [facility] CATH LAB;  Service: Cardiology;  Laterality: N/A;    EYE SURGERY      HEMORRHOID SURGERY      HERNIA REPAIR      HYSTERECTOMY      partial    indirect lumbar decompression      percutaneous placement of interspinous extension blocker    LEFT HEART CATHETERIZATION Left 05/20/2022    Procedure: CATHETERIZATION, HEART, LEFT;  Surgeon: Domingo Martins MD;  Location: [facility] CATH LAB;  Service: Cardiology;  Laterality: Left;    TONSILLECTOMY       Family History   Problem Relation Name Age of Onset    Heart disease Mother      Hypertension Mother      Diabetes Mother      Hypertension Father      Kidney disease Father      Breast cancer Sister       Social History     Socioeconomic History    Marital status:    Tobacco Use    Smoking status: Never    Smokeless tobacco: Never   Substance and Sexual Activity    Alcohol use: No    Drug use: No    Sexual activity: Not Currently     Social Drivers of Health     Financial Resource Strain: Low Risk  (6/4/2024)    Overall Financial Resource Strain (CARDIA)     Difficulty of Paying Living Expenses: Not hard at all   Food Insecurity: No Food Insecurity (7/31/2024)    [facility]    Hunger Vital Sign     Worried About Running Out of Food in the Last Year: Never true     Ran Out of Food in the Last Year: Never true   Recent Concern: Food Insecurity - Food Insecurity Present (6/4/2024)    Hunger Vital Sign     Worried About Running Out of Food in the Last Year: Sometimes true     Ran Out of Food in the Last Year: Sometimes true   Transportation Needs: No Transportation Needs (7/31/2024)    [facility]    PRAPARE - Transportation     Lack of Transportation (Medical): No     Lack of Transportation (Non-Medical): No   Physical Activity: Insufficiently Active (6/4/2024)    Exercise Vital Sign     Days of Exercise per Week: 6 days     Minutes of Exercise per Session: 20 min   Stress: Stress Concern Present (6/4/2024)    [employer] - Occupational Stress Questionnaire     Feeling of Stress : Very much   Housing Stability: Low Risk  (7/31/2024)    [facility]    Housing Stability Vital Sign     Unable to Pay for Housing in the Last Year: No     Number of Times Moved in the Last Year: 0     Homeless in the Last Year: No     Medication List with Changes/Refills   Current Medications    ASPIRIN 81 MG CHEW    Take 81 mg by mouth once daily.    ATORVASTATIN (LIPITOR) 10 MG TABLET    Take 1 tablet (10 mg total) by mouth every evening.    DONEPEZIL (ARICEPT) 10 MG TABLET    Take 10 mg by mouth.    FLUOXETINE 20 MG CAPSULE    Take 20 mg by mouth.    FUROSEMIDE (LASIX) 20 MG TABLET    TAKE 1 TABLET BY MOUTH ONCE A DAY    LOSARTAN (COZAAR) 50 MG TABLET    TAKE 1 TABLET BY MOUTH TWICE DAILY    MEMANTINE (NAMENDA) 10 MG TAB    Take 10 mg by mouth 2 (two) times daily.    MEMANTINE (NAMENDA) 5 MG TAB    Take 1 tablet (5 mg  total) by mouth 2 (two) times daily.    METOPROLOL SUCCINATE (TOPROL-XL) 25 MG 24 HR TABLET    Take 1 tablet (25 mg total) by mouth once daily.    OLANZAPINE (ZYPREXA) 5 MG TABLET    Take 5 mg by mouth every evening.     Review of patient's allergies indicates:   Allergen Reactions    Amitriptyline     Hydrochlorothiazide      Causes muscle cramping    Lisinopril      hyperkalemia    Opioids - morphine analogues Hallucinations     Chest pain and hallucinations    Oxycodone     Percocet [oxycodone-acetaminophen] Other (See Comments)     Seizures    Belbuca [buprenorphine hcl] Nausea And Vomiting and Other (See Comments)     Black out     Codeine Nausea Only and Rash    Prazosin Other (See Comments)     dizziness     Review of Systems   Constitutional: Negative for decreased appetite.   HENT:  Negative for tinnitus.    Eyes:  Negative for double vision.   Cardiovascular:  Negative for chest pain.   Respiratory:  Negative for wheezing.    Hematologic/Lymphatic: Negative for bleeding problem.   Skin:  Negative for dry skin.   Musculoskeletal:  Positive for arthritis, back pain, joint pain and muscle weakness. Negative for gout, neck pain and stiffness.   Gastrointestinal:  Negative for abdominal pain.   Genitourinary:  Negative for bladder incontinence.   Neurological:  Negative for numbness, paresthesias and sensory change.   Psychiatric/Behavioral:  Negative for altered mental status.        Objective:   Body mass index is 25.03 kg/m².  There were no vitals filed for this visit.         General    Constitutional: She is oriented to person, place, and time. She appears well-developed.   HENT:   Head: Atraumatic.   Eyes: EOM are normal.   Pulmonary/Chest: Effort normal.   Neurological: She is alert and oriented to person, place, and time.   Psychiatric: Judgment normal.           Ambulating well with a walker very steady  In the sitting position her pelvis is level  Bilateral hips passive motion without pain in the  groin.    Hip flexors and abductors and adductors are slightly weak at 5-/5   Right knee with mild to moderate swelling.  There is no warmness to touch.  There is crepitus with motion.  She has 5-120 degrees and range of motion.  No defect in the patella or quadriceps tendon.  There is weakness around 4/5 in the quads and hamstrings.  Pain is throughout the knee joint .  Slightly loose lateral collateral to valgus and varus stressing with subluxation and clicking felt today  Left knee mild degeneration and mild tenderness.  There is no swelling.  Good motion 0-125.  No defect in the patella or quadriceps tendon   Left calf with scratches on the medial aspect proximal 3rd.  Resolved .   There is ecchymosis around the area also resolved.    Right tibia There is a 3 cm scratched over the anterior tibia mid shaft without evidence of infection or erythema  Calves are soft bilaterally and nontender  There is multiple varicosities in the lower extremity  Skin over the knees within normal limits no obvious lesions    Relevant imaging results reviewed and interpreted by me, discussed with the patient and / or family today   X-ray 01/25/2024 right knee with complete loss of lateral joint space with severe valgus deformity marginal osteophytes sclerosis of the knee.  The left knee joint space is maintained mild degenerative changes  X-ray 11/10/2022 of the hips within normal limits no evidence of arthritis  X-ray of the knees I 11/10/2022 on the right side showing there is a suprapatellar hematoma there is marginal osteophytic changes there is medial subluxation of the femur on the tibia consistent with severe arthritic change.  Assessment:     Encounter Diagnoses   Name Primary?    Arthritis of knee, right Yes    Recurrent right knee instability     History of lumbar fusion         Plan:   Arthritis of knee, right  -     Large Joint Aspiration/Injection: R knee    Recurrent right knee instability    History of lumbar  fusion         Patient Instructions   You Zilretta injection last time 07/25/2024 wore off line we went over your x-ray and you have severe what we call valgus deformity that means your foot and you leg is going out on the right side and you had moderate arthritis on the left knee and it is going in and we call that a windswept deformity  You do not want to have surgery   We proceeded injecting the right knee with Zilretta today which is a slow-release steroid 11/11/2024   Continue with a Rollator  Tylenol Arthritis 650 mg 3 times a day if needed  I will see you back in 3 months        Disclaimer: This note was prepared using a voice recognition system and is likely to have sound alike errors within the text.

## 2024-11-11 NOTE — PATIENT INSTRUCTIONS
You Zilretta injection last time 07/25/2024 wore off line we went over your x-ray and you have severe what we call valgus deformity that means your foot and you leg is going out on the right side and you had moderate arthritis on the left knee and it is going in and we call that a windswept deformity  You do not want to have surgery   We proceeded injecting the right knee with Zilretta today which is a slow-release steroid 11/11/2024   Continue with a Rollator  Tylenol Arthritis 650 mg 3 times a day if needed  I will see you back in 3 months

## 2024-11-29 ENCOUNTER — TELEPHONE (OUTPATIENT)
Dept: PRIMARY CARE CLINIC | Facility: CLINIC | Age: 84
End: 2024-11-29
Payer: MEDICARE

## 2024-11-29 ENCOUNTER — PATIENT MESSAGE (OUTPATIENT)
Dept: PRIMARY CARE CLINIC | Facility: CLINIC | Age: 84
End: 2024-11-29
Payer: MEDICARE

## 2024-11-29 DIAGNOSIS — I10 ESSENTIAL HYPERTENSION: ICD-10-CM

## 2024-11-29 DIAGNOSIS — I42.8 NONISCHEMIC CARDIOMYOPATHY: ICD-10-CM

## 2024-11-29 DIAGNOSIS — E78.5 HYPERLIPIDEMIA ASSOCIATED WITH TYPE 2 DIABETES MELLITUS: ICD-10-CM

## 2024-11-29 DIAGNOSIS — E11.69 HYPERLIPIDEMIA ASSOCIATED WITH TYPE 2 DIABETES MELLITUS: ICD-10-CM

## 2024-11-29 DIAGNOSIS — I10 HYPERTENSION, UNSPECIFIED TYPE: ICD-10-CM

## 2024-11-29 RX ORDER — METOPROLOL SUCCINATE 25 MG/1
25 TABLET, EXTENDED RELEASE ORAL DAILY
Qty: 90 TABLET | Refills: 3 | Status: SHIPPED | OUTPATIENT
Start: 2024-11-29 | End: 2025-11-29

## 2024-11-29 RX ORDER — LOSARTAN POTASSIUM 50 MG/1
50 TABLET ORAL 2 TIMES DAILY
Qty: 180 TABLET | Refills: 1 | Status: SHIPPED | OUTPATIENT
Start: 2024-11-29

## 2024-11-29 RX ORDER — FUROSEMIDE 20 MG/1
20 TABLET ORAL DAILY
Qty: 30 TABLET | Refills: 11 | Status: SHIPPED | OUTPATIENT
Start: 2024-11-29

## 2024-11-29 RX ORDER — ATORVASTATIN CALCIUM 10 MG/1
10 TABLET, FILM COATED ORAL NIGHTLY
Qty: 90 TABLET | Refills: 3 | Status: SHIPPED | OUTPATIENT
Start: 2024-11-29 | End: 2025-11-29

## 2024-11-29 NOTE — TELEPHONE ENCOUNTER
Refill request for Metoprolol, Furosemide, Atorvastatin, and Losartan pended to provider for review/follow-up.

## 2024-11-29 NOTE — TELEPHONE ENCOUNTER
----- Message from Yisel sent at 11/29/2024 12:14 PM CST -----  Pt's daughter called on her behalf, asking if she could receive a call at [phone] to discuss her mother's prescriptions; please advise.

## 2024-11-29 NOTE — TELEPHONE ENCOUNTER
Returned call to pt's daughter Mis regarding medication refills.  She is going to instruct pharmacy to fax refill requests to clinic.  Needs refills today.

## 2024-12-02 RX ORDER — DONEPEZIL HYDROCHLORIDE 10 MG/1
10 TABLET, FILM COATED ORAL NIGHTLY
Qty: 90 TABLET | Refills: 0 | Status: SHIPPED | OUTPATIENT
Start: 2024-12-02 | End: 2025-03-02

## 2024-12-02 RX ORDER — OLANZAPINE 5 MG/1
5 TABLET ORAL NIGHTLY
Qty: 90 TABLET | Refills: 0 | Status: SHIPPED | OUTPATIENT
Start: 2024-12-02 | End: 2025-03-02

## 2024-12-02 RX ORDER — MEMANTINE HYDROCHLORIDE 10 MG/1
10 TABLET ORAL 2 TIMES DAILY
Qty: 180 TABLET | Refills: 0 | Status: SHIPPED | OUTPATIENT
Start: 2024-12-02 | End: 2025-03-02

## 2024-12-02 RX ORDER — FLUOXETINE HYDROCHLORIDE 20 MG/1
20 CAPSULE ORAL DAILY
Qty: 90 CAPSULE | Refills: 0 | Status: SHIPPED | OUTPATIENT
Start: 2024-12-02 | End: 2025-03-02

## 2024-12-12 ENCOUNTER — OFFICE VISIT (OUTPATIENT)
Dept: PRIMARY CARE CLINIC | Facility: CLINIC | Age: 84
End: 2024-12-12
Payer: MEDICARE

## 2024-12-12 VITALS
WEIGHT: 129.5 LBS | HEART RATE: 57 BPM | TEMPERATURE: 97 F | HEIGHT: 61 IN | BODY MASS INDEX: 24.45 KG/M2 | DIASTOLIC BLOOD PRESSURE: 58 MMHG | OXYGEN SATURATION: 96 % | SYSTOLIC BLOOD PRESSURE: 130 MMHG

## 2024-12-12 DIAGNOSIS — I10 ESSENTIAL HYPERTENSION: ICD-10-CM

## 2024-12-12 DIAGNOSIS — I69.354 HEMIPLEGIA AND HEMIPARESIS FOLLOWING CEREBRAL INFARCTION AFFECTING LEFT NON-DOMINANT SIDE: ICD-10-CM

## 2024-12-12 DIAGNOSIS — R19.7 DIARRHEA, UNSPECIFIED TYPE: ICD-10-CM

## 2024-12-12 DIAGNOSIS — F01.B4 MODERATE VASCULAR DEMENTIA WITH ANXIETY: Primary | ICD-10-CM

## 2024-12-12 PROCEDURE — 99214 OFFICE O/P EST MOD 30 MIN: CPT | Mod: PBBFAC,PN | Performed by: FAMILY MEDICINE

## 2024-12-12 PROCEDURE — 99999 PR PBB SHADOW E&M-EST. PATIENT-LVL IV: CPT | Mod: PBBFAC,,, | Performed by: FAMILY MEDICINE

## 2024-12-12 RX ORDER — OLANZAPINE 7.5 MG/1
7.5 TABLET ORAL NIGHTLY
Qty: 30 TABLET | Refills: 11 | Status: SHIPPED | OUTPATIENT
Start: 2024-12-12 | End: 2025-12-12

## 2024-12-12 NOTE — PROGRESS NOTES
Leslie CASTELLON [place]  12/12/2024  [id number]    Natalia Gomez MD  Patient Care Team:  Natalia Gomez MD as PCP - General (Family Medicine)  Elva Israel MD as Consulting Physician (Pain Medicine)  Daniel Bonilla MD (Ophthalmology)  Viki Ruiz PA-C as Physician Assistant (Internal Medicine)  Urvashi Valle as ED Navigator  Vidya Serrato RN as Registered Nurse  Future Appointments       Date Provider Specialty Appt Notes    2/3/2025 Natalia Gomez MD Primary Care 8 wk F/U    2/24/2025 Luis Ibarra MD Orthopedics right knee zilretta (LOV 11/11/24 R knee zilretta 11/11/24  - R knee monovisc 05/19/23, toradol injection 07/25/24, XR 01/25/24)    3/18/2025 Domingo Martins MD Cardiology 6 month             Chief Complaint:  Chief Complaint   Patient presents with    Follow-up     1 month f/u     loss balance     2 days ago       History of Present Illness:     1 m follow up, here with caregiver daughter Mis.  Mis reports pt gets a little sundown symptoms, wondering if olanzapine might have a role? She thought mom was discharged from [facility] with 2 weeks worth of name brand Zyprexa, with excellent result.   Persistent intermittent loose stool 2-3x/day since discharge. Imodium helps  Weak legs. They are working on standing up from sitting position, and safe transfer to her walker. Leslie slipped 2 days ago, skinned her left arm    Note from last mo 11/8/2024  Hospital follow up, here with daughter Mis, HCPOA and primary caretaker.   Several visits to geriatric behavioral unit, last discharged from [facility] 10 days ago, stayed 10 days. For disruptive behavior w psychotic features related to dementia. Both Mis and pt reports no issues at the moment. Calm. Eats sleeps well. Got on zyprexa. Just that legs are wobbly. No falls.    Recent Fall:  [x]Yes  []No  Activity:  []Vigorous []Moderate [x]Sedentary  Appetite:  []Good  [x]Fair  []Poor  Weight stable []Yes  []No lost 3 lbs  Bowel/bladder  control [x]Yes  []No    Review of Systems   Respiratory:  Negative for shortness of breath.    Cardiovascular:  Negative for chest pain and leg swelling.   Gastrointestinal:  Negative for diarrhea (loose stools 2-3/day recent. imodium helps).   Psychiatric/Behavioral:  Positive for confusion. Negative for agitation, behavioral problems, depressed mood, dysphoric mood, hallucinations, sleep disturbance and suicidal ideas. The patient is not nervous/anxious and is not hyperactive.       See HPI above  PHQ-4 Score: 0    Exam:  Vitals:    12/12/24 1433   BP: (!) 130/58   Pulse: (!) 57   Temp: 97.3 °F (36.3 °C)     Weight: 58.7 kg (129 lb 8.3 oz)   Body mass index is 24.47 kg/m².    BP Readings from Last 3 Encounters:   12/12/24 (!) 130/58   11/08/24 132/60   10/16/24 139/73        Wt Readings from Last 3 Encounters:   12/12/24 1433 58.7 kg (129 lb 8.3 oz)   11/11/24 0946 60.1 kg (132 lb 7.9 oz)   11/08/24 1549 60.1 kg (132 lb 7.9 oz)        Physical Exam  Vitals and nursing note reviewed.   Constitutional:       General: She is not in acute distress.     Appearance: She is not toxic-appearing.      Comments: Calm and smiling. Using a walker   HENT:      Head: Normocephalic and atraumatic.   Eyes:      Extraocular Movements: Extraocular movements intact.      Pupils: Pupils are equal, round, and reactive to light.   Cardiovascular:      Rate and Rhythm: Normal rate and regular rhythm.      Pulses: Normal pulses.      Heart sounds: Normal heart sounds.   Pulmonary:      Effort: Pulmonary effort is normal.      Breath sounds: Normal breath sounds.   Musculoskeletal:         General: No swelling.   Skin:     General: Skin is warm.      Capillary Refill: Capillary refill takes less than 2 seconds.      Comments: Left forearm dorsal surface superficial laceration healed. + ecchymosis   Neurological:      Mental Status: She is oriented to person, place, and time. Mental status is at baseline.   Psychiatric:         Mood and  Affect: Mood normal.          Laboratory Reviewed    Lab Results   Component Value Date    WBC 6.86 10/15/2024    HGB 12.5 10/15/2024    HCT 38.4 10/15/2024     10/15/2024    CHOL 128 04/03/2024    TRIG 78 04/03/2024    HDL 46 04/03/2024    ALT 19 10/15/2024    AST 26 10/15/2024     (H) 10/15/2024    K 4.1 10/15/2024     (H) 10/15/2024    CREATININE 0.9 10/15/2024    BUN 34 (H) 10/15/2024    CO2 21 (L) 10/15/2024    TSH 0.768 10/15/2024    TSH 0.768 10/15/2024    INR 1.1 07/30/2024    HGBA1C 5.6 08/01/2024         Health Maintenance  Health Maintenance Topics with due status: Not Due       Topic Last Completion Date    TETANUS VACCINE 06/24/2019    Lipid Panel 04/03/2024    Hemoglobin A1c 08/01/2024     Health Maintenance Due   Topic Date Due    Shingles Vaccine (1 of 2) Never done    RSV Vaccine (Age 60+ and Pregnant patients) (1 - 1-dose 75+ series) Never done    COVID-19 Vaccine (7 - 2024-25 season) 09/01/2024       Assessment and Plan   84 y.o. female with multiple co-morbid illnesses here for continued follow up of medical problems.      The primary encounter diagnosis was Moderate vascular dementia with anxiety. Diagnoses of Hemiplegia and hemiparesis following cerebral infarction affecting left non-dominant side, Essential hypertension, and Diarrhea, unspecified type were also pertinent to this visit.  1. Moderate vascular dementia with anxiety  Comments:  a little more symptomatic lately. adjust dose of namentine and olanazepine  Overview:  On olanzapine 5, fluoxetine 20,   Memantine 5, donepezil 10    Orders:  -     OLANZapine (ZYPREXA) 7.5 MG tablet; Take 1 tablet (7.5 mg total) by mouth every evening.  Dispense: 30 tablet; Refill: 11    2. Hemiplegia and hemiparesis following cerebral infarction affecting left non-dominant side  Comments:  cont home exercise    3. Essential hypertension  Comments:  bp at goal    4. Diarrhea, unspecified type  Comments:  imodium prn. probiotics  change  of psychotropic meds today may also improve sxs        Health Maintenance         Date Due Completion Date    Shingles Vaccine (1 of 2) Never done ---    RSV Vaccine (Age 60+ and Pregnant patients) (1 - 1-dose 75+ series) Never done ---    COVID-19 Vaccine (7 - 2024-25 season) 09/01/2024 12/31/2021    Hemoglobin A1c 02/01/2025 8/1/2024    Lipid Panel 04/03/2025 4/3/2024    TETANUS VACCINE 06/24/2029 6/24/2019            Discharge instructions   Increase olazapine to 7.5 mg nightly, communicate w pharmacy, to get consistent brand of generics  Decrease namenda to 10 mg daily - to see if decrease dizziness  Cont home exercise on standing from sitting chair  Imodium for loose stool.   OTC probiotics        Follow up: Follow up in about 8 weeks (around 2/6/2025).

## 2024-12-12 NOTE — PATIENT INSTRUCTIONS
Increase olazapine to 7.5 mg nightly, communicate w pharmacy, to get consistent brand of generics  Decrease namenda to 10 mg daily - to see if decrease dizziness  Cont home exercise on standing from sitting chair  Imodium for loose stool.   OTC probiotics     If you are feeling unwell, we'd like to be the first ones to know here at [facility]! Please give us a call. Same day appointments are our top priority to keep you well and out of the emergency rooms and hospitals. Call [phone] for our direct line. After hours advice is always available. Please call [phone] after hours to speak to the on-call team.

## 2025-01-30 DIAGNOSIS — Z00.00 ENCOUNTER FOR MEDICARE ANNUAL WELLNESS EXAM: ICD-10-CM

## 2025-02-03 ENCOUNTER — OFFICE VISIT (OUTPATIENT)
Dept: PRIMARY CARE CLINIC | Facility: CLINIC | Age: 85
End: 2025-02-03
Payer: MEDICARE

## 2025-02-03 ENCOUNTER — HOSPITAL ENCOUNTER (OUTPATIENT)
Dept: RADIOLOGY | Facility: HOSPITAL | Age: 85
Discharge: HOME | End: 2025-02-03
Attending: FAMILY MEDICINE
Payer: MEDICARE

## 2025-02-03 VITALS
HEIGHT: 61 IN | DIASTOLIC BLOOD PRESSURE: 54 MMHG | SYSTOLIC BLOOD PRESSURE: 128 MMHG | BODY MASS INDEX: 24.31 KG/M2 | WEIGHT: 128.75 LBS | OXYGEN SATURATION: 96 % | TEMPERATURE: 97 F | HEART RATE: 66 BPM

## 2025-02-03 DIAGNOSIS — F01.B4 MODERATE VASCULAR DEMENTIA WITH ANXIETY: ICD-10-CM

## 2025-02-03 DIAGNOSIS — I69.354 HEMIPLEGIA AND HEMIPARESIS FOLLOWING CEREBRAL INFARCTION AFFECTING LEFT NON-DOMINANT SIDE: ICD-10-CM

## 2025-02-03 DIAGNOSIS — Z02.2 ENCOUNTER FOR EXAMINATION FOR ADMISSION TO NURSING HOME: ICD-10-CM

## 2025-02-03 DIAGNOSIS — N18.31 STAGE 3A CHRONIC KIDNEY DISEASE: ICD-10-CM

## 2025-02-03 DIAGNOSIS — R32 URINARY INCONTINENCE, UNSPECIFIED TYPE: ICD-10-CM

## 2025-02-03 DIAGNOSIS — Z02.2 ENCOUNTER FOR EXAMINATION FOR ADMISSION TO NURSING HOME: Primary | ICD-10-CM

## 2025-02-03 DIAGNOSIS — E78.49 OTHER HYPERLIPIDEMIA: ICD-10-CM

## 2025-02-03 DIAGNOSIS — F33.1 MODERATE EPISODE OF RECURRENT MAJOR DEPRESSIVE DISORDER: ICD-10-CM

## 2025-02-03 DIAGNOSIS — I10 ESSENTIAL HYPERTENSION: ICD-10-CM

## 2025-02-03 DIAGNOSIS — I42.8 NONISCHEMIC CARDIOMYOPATHY: ICD-10-CM

## 2025-02-03 PROBLEM — E11.69 HYPERLIPIDEMIA ASSOCIATED WITH TYPE 2 DIABETES MELLITUS: Status: RESOLVED | Noted: 2018-07-30 | Resolved: 2025-02-03

## 2025-02-03 PROBLEM — E78.5 HYPERLIPIDEMIA ASSOCIATED WITH TYPE 2 DIABETES MELLITUS: Status: RESOLVED | Noted: 2018-07-30 | Resolved: 2025-02-03

## 2025-02-03 PROBLEM — E11.69 HYPERLIPIDEMIA ASSOCIATED WITH TYPE 2 DIABETES MELLITUS: Status: ACTIVE | Noted: 2018-07-30

## 2025-02-03 PROCEDURE — 1159F MED LIST DOCD IN RCRD: CPT | Mod: CPTII,S$GLB,, | Performed by: FAMILY MEDICINE

## 2025-02-03 PROCEDURE — 99999 PR PBB SHADOW E&M-EST. PATIENT-LVL IV: CPT | Mod: PBBFAC,,, | Performed by: FAMILY MEDICINE

## 2025-02-03 PROCEDURE — 1126F AMNT PAIN NOTED NONE PRSNT: CPT | Mod: CPTII,S$GLB,, | Performed by: FAMILY MEDICINE

## 2025-02-03 PROCEDURE — 1157F ADVNC CARE PLAN IN RCRD: CPT | Mod: CPTII,S$GLB,, | Performed by: FAMILY MEDICINE

## 2025-02-03 PROCEDURE — 1160F RVW MEDS BY RX/DR IN RCRD: CPT | Mod: CPTII,S$GLB,, | Performed by: FAMILY MEDICINE

## 2025-02-03 PROCEDURE — 71046 X-RAY EXAM CHEST 2 VIEWS: CPT | Mod: 26,,, | Performed by: RADIOLOGY

## 2025-02-03 PROCEDURE — 3288F FALL RISK ASSESSMENT DOCD: CPT | Mod: CPTII,S$GLB,, | Performed by: FAMILY MEDICINE

## 2025-02-03 PROCEDURE — 81001 URINALYSIS AUTO W/SCOPE: CPT | Performed by: FAMILY MEDICINE

## 2025-02-03 PROCEDURE — 1101F PT FALLS ASSESS-DOCD LE1/YR: CPT | Mod: CPTII,S$GLB,, | Performed by: FAMILY MEDICINE

## 2025-02-03 PROCEDURE — 99215 OFFICE O/P EST HI 40 MIN: CPT | Mod: S$GLB,,, | Performed by: FAMILY MEDICINE

## 2025-02-03 PROCEDURE — 71046 X-RAY EXAM CHEST 2 VIEWS: CPT | Mod: TC,FY,PO

## 2025-02-03 PROCEDURE — 80053 COMPREHEN METABOLIC PANEL: CPT | Performed by: FAMILY MEDICINE

## 2025-02-03 RX ORDER — VITAMIN B COMPLEX
1 CAPSULE ORAL DAILY
COMMUNITY
Start: 2025-02-03

## 2025-02-03 RX ORDER — OLANZAPINE 5 MG/1
5 TABLET ORAL NIGHTLY
Qty: 90 TABLET | Refills: 1 | Status: SHIPPED | OUTPATIENT
Start: 2025-02-03 | End: 2025-08-02

## 2025-02-03 RX ORDER — LANOLIN ALCOHOL/MO/W.PET/CERES
1000 CREAM (GRAM) TOPICAL DAILY
COMMUNITY
Start: 2025-02-03

## 2025-02-03 NOTE — ASSESSMENT & PLAN NOTE
Lab Results   Component Value Date    CHOL 128 04/03/2024    CHOL 139 11/08/2023    CHOL 96 (L) 02/01/2023     Lab Results   Component Value Date    HDL 46 04/03/2024    HDL 45 11/08/2023    HDL 47 02/01/2023     Lab Results   Component Value Date    LDLCALC 66.4 04/03/2024    LDLCALC 64.8 11/08/2023    LDLCALC 38.8 (L) 02/01/2023     Lab Results   Component Value Date    TRIG 78 04/03/2024    TRIG 146 11/08/2023    TRIG 51 02/01/2023       Lab Results   Component Value Date    CHOLHDL 35.9 04/03/2024    CHOLHDL 32.4 11/08/2023    CHOLHDL 49.0 02/01/2023

## 2025-02-03 NOTE — ASSESSMENT & PLAN NOTE
Patient used to get agitated before she was placed on Zyprexa, continue Zyprexa 5 mg at night.  Continue fluoxetine 20 mg once in the morning.

## 2025-02-03 NOTE — ASSESSMENT & PLAN NOTE
CT of head August, 2024Impression      1. No acute intracranial hemorrhage.  2. Chronic right occipital lobe infarct.  3. Other findings as described.  Narrative    INDICATION: confusion    TECHNIQUE: Noncontrast head CT. Automated exposure control was used to reduce radiation dose.      COMPARISON: 5/9/2013.    FINDINGS:    No acute intracranial hemorrhage.  A chronic right occipital lobe infarct.  Chronic periventricular white matter small vessel ischemic change.  Mild right occipital horn dilatation.  No midline shift.  Carotid/vertebral vascular calcified plaque.  Aerated paranasal sinuses and mastoid air cells.  Mild nasal septal deviation.  Intact calvarium.  Intact scalp.  Intact intraorbital structures.

## 2025-02-03 NOTE — ASSESSMENT & PLAN NOTE
According to patient's daughter, her mood changes are controlled but she is lethargic.  Advised to take fluoxetine 20 mg in the morning, Aricept 10 mg in the morning, memantine 10 mg twice a day.

## 2025-02-03 NOTE — ASSESSMENT & PLAN NOTE
Latest Reference Range & Units 11/10/22 02:48 12/15/22 11:28 02/01/23 10:39 02/02/23 04:26 03/08/23 10:51 11/08/23 10:16 04/03/24 15:31 04/03/24 22:08 04/16/24 13:19 07/20/24 07:18 09/12/24 16:41 09/12/24 19:11 10/15/24 14:49   eGFR >60 mL/min/1.73 m^2 >60 >60 >60 >60 50 ! 49.2 ! 40 ! 44 ! >60 55 ! 40.0 ! 40 ! >60   !: Data is abnormal  Check CMP today

## 2025-02-03 NOTE — ASSESSMENT & PLAN NOTE
Cardiac F/U soon, patient denies any chest symptoms, patient has dementia as well so not sure whether she is able to recognize her symptoms , last one 2022.

## 2025-02-03 NOTE — PROGRESS NOTES
Primary Care Provider Appointment   [facility], [place]y [facility]    Patient ID: Leslie Wood is a 84 y.o. female.    ASSESSMENT/PLAN by Problem List:    1. Encounter for examination for admission to nursing home  Assessment & Plan:  Chest x-ray to be done    Orders:  -     X-Ray Chest PA And Lateral; Future; Expected date: 02/03/2025    2. Essential hypertension  Assessment & Plan:  Stay stable, continue metoprolol 25 mg once a day, Lasix 20 mg daily and losartan 50 mg once a day    Orders:  -     Cancel: COMPREHENSIVE METABOLIC PANEL  -     COMPREHENSIVE METABOLIC PANEL    3. Moderate vascular dementia with anxiety  Overview:  On olanzapine 5, fluoxetine 20,   Memantine 5, donepezil 10    Assessment & Plan:  According to patient's daughter, her mood changes are controlled but she is lethargic.  Advised to take fluoxetine 20 mg in the morning, Aricept 10 mg in the morning, memantine 10 mg twice a day.      4. Stage 3a chronic kidney disease  Assessment & Plan:   Latest Reference Range & Units 11/10/22 02:48 12/15/22 11:28 02/01/23 10:39 02/02/23 04:26 03/08/23 10:51 11/08/23 10:16 04/03/24 15:31 04/03/24 22:08 04/16/24 13:19 07/20/24 07:18 09/12/24 16:41 09/12/24 19:11 10/15/24 14:49   eGFR >60 mL/min/1.73 m^2 >60 >60 >60 >60 50 ! 49.2 ! 40 ! 44 ! >60 55 ! 40.0 ! 40 ! >60   !: Data is abnormal  Check CMP today      5. Nonischemic cardiomyopathy  Overview:  Takotsubo cardiomyopathy (apical ballooning syndrome)    Assessment & Plan:  Cardiac F/U soon, patient denies any chest symptoms, patient has dementia as well so not sure whether she is able to recognize her symptoms , last one 2022.      6. Urinary incontinence, unspecified type  -     Cancel: Urinalysis  -     Urinalysis    7. Moderate episode of recurrent major depressive disorder  Assessment & Plan:  Patient used to get agitated before she was placed on Zyprexa, continue Zyprexa 5 mg at night.  Continue fluoxetine 20 mg once  in the morning.      8. 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 No acute intracranial hemorrhage.  2. Chronic right occipital lobe infarct.  3. Other findings as described.  Narrative    INDICATION: confusion    TECHNIQUE: Noncontrast head CT. Automated exposure control was used to reduce radiation dose.      COMPARISON: 5/9/2013.    FINDINGS:    No acute intracranial hemorrhage.  A chronic right occipital lobe infarct.  Chronic periventricular white matter small vessel ischemic change.  Mild right occipital horn dilatation.  No midline shift.  Carotid/vertebral vascular calcified plaque.  Aerated paranasal sinuses and mastoid air cells.  Mild nasal septal deviation.  Intact calvarium.  Intact scalp.  Intact intraorbital structures.          9. Other hyperlipidemia  Assessment & Plan:  Lab Results   Component Value Date    CHOL 128 04/03/2024    CHOL 139 11/08/2023    CHOL 96 (L) 02/01/2023     Lab Results   Component Value Date    HDL 46 04/03/2024    HDL 45 11/08/2023    HDL 47 02/01/2023     Lab Results   Component Value Date    LDLCALC 66.4 04/03/2024    LDLCALC 64.8 11/08/2023    LDLCALC 38.8 (L) 02/01/2023     Lab Results   Component Value Date    TRIG 78 04/03/2024    TRIG 146 11/08/2023    TRIG 51 02/01/2023       Lab Results   Component Value Date    CHOLHDL 35.9 04/03/2024    CHOLHDL 32.4 11/08/2023    CHOLHDL 49.0 02/01/2023          Other orders  -     OLANZapine (ZYPREXA) 5 MG tablet; Take 1 tablet (5 mg total) by mouth every evening.  Dispense: 90 tablet; Refill: 1  -     multivitamin Tab; Take 1 tablet by mouth once daily.  -     cyanocobalamin (VITAMIN B-12) 1000 MCG tablet; Take 1 tablet (1,000 mcg total) by mouth once daily.  -     b complex vitamins capsule; Take 1 capsule by mouth once daily.         Follow Up:      @timebasedbillingstatement@    Health Maintenance         Date Due  Completion Date    Shingles Vaccine (1 of 2) Never done ---    RSV Vaccine (Age 60+ and Pregnant patients) (1 - 1-dose 75+ series) Never done ---    COVID-19 Vaccine (7 - 2024-25 season) 09/01/2024 12/31/2021    Hemoglobin A1c 02/01/2025 8/1/2024    Lipid Panel 04/03/2025 4/3/2024    TETANUS VACCINE 06/24/2029 6/24/2019            Advance Care Planning     Date: 02/03/2025    Today a voluntary meeting took place: Exam Room    Patient Participation: Patient is unable to participate due to dementia     Attendees (Name and  Relationship to patient):  Patient & her daughter    Staff attendees (Name and  Role): None    ACP Conversation (General): Understanding of advance care planning and role of health care agent defined - Patient has only one living daughter who has the power of      Code Status: did not engage in discussions today- after reading the pages, the daughter is to make     ACP Documents: Provided ACP documents    Goals of care: The healthcare power of   endorses that what is most important right now is to focus on comfort and QOL     Accordingly, we have decided that the best plan to meet the patient's goals includes comfort care      Recommendations/  Follow-up tasks: Bring the signed forms      Length of ACP   conversation in minutes: A total of 6  min was spent on advance care planning, goals of care discussion, emotional support, formulating and communicating prognosis and exploring burden/benefit of various approaches of treatment. This discussion occurred on a fully voluntary basis with the verbal consent of the patient and/or family.             Subjective:     Chief Complaint   Patient presents with    Follow-up     8 week f/u     nasal drip     I have reviewed the information entered by the ancillary staff regarding the chief complaint as well as the related history.    HPI    Patient is a/an 84 y.o.  female     History of Present Illness    CHIEF COMPLAINT:  Leslie presents today for  "follow-up and to get paperwork completed for long-term care placement.    DEMENTIA AND FUNCTIONAL STATUS:  She has a history of dementia with previously hostile behavior requiring facility placement on two occasions. Her medications have since been regulated, resulting in improved management of dementia symptoms. Currently, she appears lethargic and is described as "going through the motions." She demonstrates difficulty navigating between rooms in her moderately sized home, specifically struggling to locate the dining room, living room, bathroom, and bedroom. She reports significant functional decline, recently experiencing near collapse from exhaustion after minimal walking at [employer].  Patient's daughter Mis states that it is not safe to take care of her at home since her  who used to take care of her had some cardiac issues and unable to do so.  She feels that her care would be better to nursing home.      She takes Olanzapine (Zyprexa) 5 mg daily at 6-7 PM, Memantine (Namenda) twice daily (morning and night, as daily dosing was not effective), and Fluoxetine daily with breakfast at 7:30-8:00 AM.  Her mood swings are better with the current dosage of medications.    She experiences very loose stools occurring twice daily.  Currently she is given Imodium on as-needed basis.  She reports unintentional weight loss and decreased appetite.  Noted to have lost 4 lb since last visit.    She has progressive worsening of urinary incontinence requiring pads. She voids approximately 5 times per day and does not indicate when there is a need to urinate, but will void when taken to the bathroom.    FAMILY HISTORY:  Her sister is  and her  passed away from COVID 4 years ago.          For complete problem list, past medical history, surgical history, social history, etc., see appropriate section in the electronic medical record    Review of Systems   Constitutional:  Negative for chills, diaphoresis and " "fever, +unintentional wt loss.   Eyes: Negative for eye related complaints  Respiratory:  Negative for cough and shortness of breath.    Cardiovascular:  Negative for chest pain, palpitations and leg swelling.   Gastrointestinal:  Negative for blood in stool, constipation, +diarrhea,  no nausea and vomiting.   Musculoskeletal: Negative for joint and muscle symptoms  Genitourinary:  +frequency and hematuria.   Skin:  Negative itching & rashes  Psychiatric/Behavioral:  + dementia, Depression under moderate control with the current medications per patient's daughter      Objective     Physical Exam  Vitals:    02/03/25 0839   BP: (!) 128/54   BP Location: Left arm   Patient Position: Sitting   Pulse: 66   Temp: 97.4 °F (36.3 °C)   TempSrc: Temporal   SpO2: 96%   Weight: 58.4 kg (128 lb 12 oz)   Height: 5' 1" (1.549 m)       PE:  Constitutional:  Not in acute distress, alert and oriented x3  HENT: no swollen glands in the neck, no thyromegaly  Eyes: EOMI, conjunctiva within normal limits, PERRLA  Cardiovascular:  Rate and rhythm within normal limits, normal pulses  Pulmonary:  Normal breath sounds, and normal pulmonary efforts w/o wheezing or rales  Abdominal:  Normal bowel sounds, no abdominal pain     Musculoskeletal:  Adequate range of motion of the spine and the joints  Skin:  Skin is warm and dry.   Neurological:  No sensory or motor deficits.   Psychiatric:  Flat affect, not much involved in our conversation during the visit      This note was generated with the assistance of ambient listening technology. Verbal consent was obtained by the patient and accompanying visitor(s) for the recording of patient appointment to facilitate this note. I attest to having reviewed and edited the generated note for accuracy, though some syntax or spelling errors may persist. Please contact the author of this note for any clarification.     THIS DOCUMENT WAS MADE IN PART WITH VOICE RECOGNITION SOFTWARE.  OCCASIONALLY THIS " SOFTWARE WILL MISINTERPRET WORDS OR PHRASES.

## 2025-02-03 NOTE — ASSESSMENT & PLAN NOTE
Stay stable, continue metoprolol 25 mg once a day, Lasix 20 mg daily and losartan 50 mg once a day

## 2025-02-04 LAB
ALBUMIN SERPL BCP-MCNC: 2.9 G/DL (ref 3.5–5.2)
ALP SERPL-CCNC: 104 U/L (ref 40–150)
ALT SERPL W/O P-5'-P-CCNC: 14 U/L (ref 10–44)
ANION GAP SERPL CALC-SCNC: 12 MMOL/L (ref 8–16)
AST SERPL-CCNC: 18 U/L (ref 10–40)
BACTERIA #/AREA URNS AUTO: ABNORMAL /HPF
BILIRUB SERPL-MCNC: 0.3 MG/DL (ref 0.1–1)
BILIRUB UR QL STRIP: NEGATIVE
BUN SERPL-MCNC: 40 MG/DL (ref 8–23)
CALCIUM SERPL-MCNC: 9.1 MG/DL (ref 8.7–10.5)
CHLORIDE SERPL-SCNC: 107 MMOL/L (ref 95–110)
CLARITY UR REFRACT.AUTO: CLEAR
CO2 SERPL-SCNC: 25 MMOL/L (ref 23–29)
COLOR UR AUTO: YELLOW
CREAT SERPL-MCNC: 1.3 MG/DL (ref 0.5–1.4)
EST. GFR  (NO RACE VARIABLE): 40 ML/MIN/1.73 M^2
GLUCOSE SERPL-MCNC: 82 MG/DL (ref 70–110)
GLUCOSE UR QL STRIP: NEGATIVE
HGB UR QL STRIP: NEGATIVE
HYALINE CASTS UR QL AUTO: 11 /LPF
KETONES UR QL STRIP: NEGATIVE
LEUKOCYTE ESTERASE UR QL STRIP: ABNORMAL
MICROSCOPIC COMMENT: ABNORMAL
NITRITE UR QL STRIP: NEGATIVE
PH UR STRIP: 5 [PH] (ref 5–8)
POTASSIUM SERPL-SCNC: 4.6 MMOL/L (ref 3.5–5.1)
PROT SERPL-MCNC: 7 G/DL (ref 6–8.4)
PROT UR QL STRIP: NEGATIVE
RBC #/AREA URNS AUTO: 2 /HPF (ref 0–4)
SODIUM SERPL-SCNC: 144 MMOL/L (ref 136–145)
SP GR UR STRIP: 1.02 (ref 1–1.03)
SQUAMOUS #/AREA URNS AUTO: 9 /HPF
URN SPEC COLLECT METH UR: ABNORMAL
WBC #/AREA URNS AUTO: 37 /HPF (ref 0–5)

## 2025-02-17 RX ORDER — MEMANTINE HYDROCHLORIDE 10 MG/1
10 TABLET ORAL 2 TIMES DAILY
Qty: 180 TABLET | Refills: 0 | Status: SHIPPED | OUTPATIENT
Start: 2025-02-17

## 2025-02-17 RX ORDER — FLUOXETINE HYDROCHLORIDE 20 MG/1
20 CAPSULE ORAL
Qty: 90 CAPSULE | Refills: 0 | Status: SHIPPED | OUTPATIENT
Start: 2025-02-17

## 2025-02-20 RX ORDER — DONEPEZIL HYDROCHLORIDE 10 MG/1
10 TABLET, FILM COATED ORAL NIGHTLY
Qty: 90 TABLET | Refills: 0 | Status: SHIPPED | OUTPATIENT
Start: 2025-02-20

## 2025-03-11 ENCOUNTER — TELEPHONE (OUTPATIENT)
Dept: PRIMARY CARE CLINIC | Facility: CLINIC | Age: 85
End: 2025-03-11
Payer: MEDICARE

## 2025-03-11 NOTE — TELEPHONE ENCOUNTER
Phone call rec'd from pt's daughter, Mis, requesting a current med list and an order for admission to [facility].    Dr. Allison informed and all sent to admit nurse Mckayla from [facility].

## 2025-03-11 NOTE — PROGRESS NOTES
Patient is being admitted to Nursing home- [facility]  Order given for admission    Zuri Allison MD

## 2025-03-18 ENCOUNTER — OFFICE VISIT (OUTPATIENT)
Dept: CARDIOLOGY | Facility: CLINIC | Age: 85
End: 2025-03-18
Payer: MEDICARE

## 2025-03-18 VITALS — OXYGEN SATURATION: 96 % | DIASTOLIC BLOOD PRESSURE: 80 MMHG | HEART RATE: 52 BPM | SYSTOLIC BLOOD PRESSURE: 130 MMHG

## 2025-03-18 DIAGNOSIS — Z78.9 STATIN INTOLERANCE: ICD-10-CM

## 2025-03-18 DIAGNOSIS — E11.9 TYPE 2 DIABETES MELLITUS WITHOUT COMPLICATION, WITHOUT LONG-TERM CURRENT USE OF INSULIN: ICD-10-CM

## 2025-03-18 DIAGNOSIS — I70.0 ATHEROSCLEROSIS OF AORTA: ICD-10-CM

## 2025-03-18 DIAGNOSIS — I42.8 OTHER CARDIOMYOPATHY: ICD-10-CM

## 2025-03-18 DIAGNOSIS — I51.81 STRESS-INDUCED CARDIOMYOPATHY: ICD-10-CM

## 2025-03-18 DIAGNOSIS — Q24.5 CORONARY-MYOCARDIAL BRIDGE: ICD-10-CM

## 2025-03-18 DIAGNOSIS — I10 HYPERTENSION, UNSPECIFIED TYPE: ICD-10-CM

## 2025-03-18 DIAGNOSIS — I65.23 CAROTID STENOSIS, BILATERAL: Primary | ICD-10-CM

## 2025-03-18 PROCEDURE — 99999 PR PBB SHADOW E&M-EST. PATIENT-LVL IV: CPT | Mod: PBBFAC,,, | Performed by: INTERNAL MEDICINE

## 2025-03-18 NOTE — PROGRESS NOTES
Subjective:   Patient ID:  Leslie Wood is a 84 y.o. female who presents for evaluation of Follow-up and Foot Swelling  MARCH 18, 2025.    ACCOMPANIED BY HER KNEES.    She is now living in the nursing home.    She has bilateral lower extremity swelling that is was not seen in the past.  She denies however any orthopnea.    Lung exam no crackles.    Denies any chest pain.    Blood pressure on arrival was elevated however repeat was normal.          September 3, 2024.    Comes in for a six-month follow-up.    She was recently in the hospital with a seizure activity.  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    However she could not get an MRI because of she has a neurostimulator.          4.24.2024  Was recently in the emergency room for UTI hypertensive emergency.       2.15.2024  Comes in for six-month follow-up.      8.30.2023  Comes in for follow-up.    Two weeks cardiac monitor non relevant. no more episodes of TIA      05/04/2023.    Comes in for evaluation for loop recorder.    She had seen neurology.    She had the 14 days monitor did not show any arrhythmias.        1.31.2023  She presented last month to the hospital with syncope.  Her beta-blocker dose was decreased.    And was switched from carvedilol to Toprol 25 mg daily.        7.27.2022 6.6.2022  Comes in for follow-up.  She was admitted twice to the hospital last month with chest pain.  Had 2 heart catheterization.  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      Interval hx: 2/11/2021   Complains of occipital headache  No chest pain, no dyspnea  States she is doing rehab and sometimes her BP is 200         Initial HPI: 10/1/2020 Patient 80-year-old, prediabetic, with history of hypertension, who recently had a stroke about 3 months ago and was hospitalized at [facility].  She does not recall what kind of cardiac workup she underwent while at that hospital.  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  Her left-sided weakness has improved since the stroke.  She is undergoing rehab.  And should undergo speech therapy soon as she states.  She reports compliance with her blood pressure medications, however she reports feeling dizzy when she stands up, she does not get dizzy or any other circumstances.  She states that she is not drinking enough water.  Also she complains of bilateral lower extremity mild swelling that comes on and off when she takes or not take her amlodipine.  She denies any chest pain, dyspnea, orthopnea, PND, palpitations, syncope, presyncope, bleeding.                Past Medical History:   Diagnosis Date    Alzheimer's disease, unspecified (CODE)     Arthritis     Cataract     Dementia without behavioral disturbance     GERD (gastroesophageal reflux disease)     Heart attack 05/23/2022    Hypertension     Stroke        Past Surgical History:   Procedure Laterality Date    ADENOIDECTOMY      ADRENAL GLAND SURGERY      APPENDECTOMY      BACK SURGERY      fusion l 4-5 s 1,2,3  fusion l 2-3    CORONARY ANGIOGRAPHY N/A 05/20/2022    Procedure: ANGIOGRAM, CORONARY ARTERY;  Surgeon: Domingo Martins MD;  Location: [facility] CATH LAB;  Service: Cardiology;  Laterality: N/A;    CORONARY ANGIOGRAPHY N/A 05/24/2022    Procedure: ANGIOGRAM, CORONARY ARTERY;  Surgeon: Domingo Martins MD;  Location: [facility] CATH LAB;  Service: Cardiology;  Laterality: N/A;    EYE SURGERY      HEMORRHOID SURGERY      HERNIA REPAIR      HYSTERECTOMY      partial    indirect lumbar decompression      percutaneous placement of interspinous extension blocker    LEFT HEART CATHETERIZATION Left 05/20/2022    Procedure: CATHETERIZATION, HEART, LEFT;  Surgeon: Domingo Martins MD;  Location: [facility] CATH LAB;  Service: Cardiology;  Laterality: Left;    TONSILLECTOMY         Social History     Tobacco Use    Smoking status: Never    Smokeless tobacco: Never   Substance Use Topics    Alcohol use: No    Drug use: No        Family History   Problem Relation Name Age of Onset    Heart disease Mother      Hypertension Mother      Diabetes Mother      Hypertension Father      Kidney disease Father      Breast cancer Sister         Review of Systems   Constitutional: Negative for fever and malaise/fatigue.   HENT: Negative for sore throat.    Eyes: Negative for blurred vision.   Cardiovascular: Negative for chest pain, claudication, cyanosis, dyspnea on exertion, irregular heartbeat, leg swelling, near-syncope, orthopnea, palpitations, paroxysmal nocturnal dyspnea and syncope.         Current Outpatient Medications on File Prior to Visit   Medication Sig    aspirin 81 MG Chew Take 81 mg by mouth once daily.    atorvastatin (LIPITOR) 10 MG tablet Take 1 tablet (10 mg total) by mouth every evening.    b complex vitamins capsule Take 1 capsule by mouth once daily.    cyanocobalamin (VITAMIN B-12) 1000 MCG tablet Take 1 tablet (1,000 mcg total) by mouth once daily.    donepeziL (ARICEPT) 10 MG tablet TAKE 1 TABLET BY MOUTH EVERY EVENING    FLUoxetine 20 MG capsule TAKE ONE CAPSULE BY MOUTH ONCE DAILY    furosemide (LASIX) 20 MG tablet Take 1 tablet (20 mg total) by mouth once daily.    losartan (COZAAR) 50 MG tablet Take 1 tablet (50 mg total) by mouth 2 (two) times daily.    memantine (NAMENDA) 10 MG Tab TAKE 1 TABLET BY MOUTH TWICE DAILY    metoprolol succinate (TOPROL-XL) 25 MG 24 hr tablet Take 1 tablet (25 mg total) by mouth once daily.    multivitamin Tab Take 1 tablet by mouth once daily.    OLANZapine (ZYPREXA) 5 MG tablet Take 1 tablet (5 mg total) by mouth every evening.    [DISCONTINUED] carvediloL (COREG) 6.25 MG tablet Take 1 tablet (6.25 mg total) by mouth 2 (two) times daily. TAKE (1) TABLET BY MOUTH 2 TIMES A DAY WITH MEALS    [DISCONTINUED] cloNIDine (CATAPRES) 0.1 MG tablet Take 1 tablet (0.1 mg total) by mouth 2 (two) times daily. To take an extra dose if BP >160/90    [DISCONTINUED] diclofenac sodium (VOLTAREN) 1 %  Gel Apply 2 g topically 3 (three) times daily.    [DISCONTINUED] isosorbide mononitrate (IMDUR) 30 MG 24 hr tablet TAKE 1 TABLET BY MOUTH TWICE A DAY    [DISCONTINUED] nitroGLYCERIN (NITROSTAT) 0.4 MG SL tablet Place 1 tablet (0.4 mg total) under the tongue every 5 (five) minutes as needed for Chest pain.     No current facility-administered medications on file prior to visit.       Objective:   Objective:  Wt Readings from Last 3 Encounters:   02/03/25 58.4 kg (128 lb 12 oz)   12/12/24 58.7 kg (129 lb 8.3 oz)   11/11/24 60.1 kg (132 lb 7.9 oz)     Temp Readings from Last 3 Encounters:   02/03/25 97.4 °F (36.3 °C) (Temporal)   12/12/24 97.3 °F (36.3 °C) (Temporal)   11/08/24 97.2 °F (36.2 °C) (Temporal)     BP Readings from Last 3 Encounters:   03/18/25 130/80   02/03/25 (!) 128/54   12/12/24 (!) 130/58     Pulse Readings from Last 3 Encounters:   03/18/25 (!) 52   02/03/25 66   12/12/24 (!) 57       Physical Exam  Vitals reviewed.   Constitutional:       Appearance: She is well-developed.   HENT:      Head: Normocephalic and atraumatic.   Eyes:      General: No scleral icterus.     Conjunctiva/sclera: Conjunctivae normal.   Cardiovascular:      Rate and Rhythm: Normal rate and regular rhythm.      Pulses: Intact distal pulses.      Heart sounds: Normal heart sounds. No murmur heard.           Lab Results   Component Value Date    CHOL 128 04/03/2024    CHOL 139 11/08/2023    CHOL 96 (L) 02/01/2023     Lab Results   Component Value Date    HDL 46 04/03/2024    HDL 45 11/08/2023    HDL 47 02/01/2023     Lab Results   Component Value Date    LDLCALC 66.4 04/03/2024    LDLCALC 64.8 11/08/2023    LDLCALC 38.8 (L) 02/01/2023     Lab Results   Component Value Date    TRIG 78 04/03/2024    TRIG 146 11/08/2023    TRIG 51 02/01/2023     Lab Results   Component Value Date    CHOLHDL 35.9 04/03/2024    CHOLHDL 32.4 11/08/2023    CHOLHDL 49.0 02/01/2023       Chemistry        Component Value Date/Time     02/03/2025 0934     K 4.6 02/03/2025 0934     02/03/2025 0934    CO2 25 02/03/2025 0934    BUN 40 (H) 02/03/2025 0934    CREATININE 1.3 02/03/2025 0934    GLU 82 02/03/2025 0934        Component Value Date/Time    CALCIUM 9.1 02/03/2025 0934    ALKPHOS 104 02/03/2025 0934    AST 18 02/03/2025 0934    ALT 14 02/03/2025 0934    BILITOT 0.3 02/03/2025 0934    ESTGFRAFRICA 50 08/01/2024 0355    ESTGFRAFRICA >60 07/23/2022 2111    EGFRNONAA >60 07/23/2022 2111          Lab Results   Component Value Date    TSH 0.768 10/15/2024    TSH 0.768 10/15/2024     Lab Results   Component Value Date    INR 1.1 07/30/2024    INR 1.0 02/02/2023    INR 1.1 05/24/2022     Lab Results   Component Value Date    WBC 6.86 10/15/2024    HGB 12.5 10/15/2024    HCT 38.4 10/15/2024    MCV 91 10/15/2024     10/15/2024     BNP  @LABRCNTIP(BNP,BNPTRIAGEBLO)@  CrCl cannot be calculated (Patient's most recent lab result is older than the maximum 7 days allowed.).     Imaging:  ======  Results for orders placed during the hospital encounter of 03/02/20   Echo Color Flow Doppler? Yes    Narrative · Mild concentric left ventricular hypertrophy.  · Normal left ventricular systolic function. The estimated ejection   fraction is 60%.  · Normal LV diastolic function.  · Normal right ventricular systolic function.  · Mild aortic regurgitation.  · Normal central venous pressure (3 mmHg).  · Trivial pericardial effusion.        No results found for this or any previous visit.  No results found for this or any previous visit.  Results for orders placed during the hospital encounter of 05/11/17   X-Ray Chest PA And Lateral    Narrative XR CHEST PA AND LATERAL, 05/11/17 11:38:46    Clinical indication: Chest pain.    Findings:  Comparison with 05/10/2017.    Epidural leads within the dorsal thoracic spine.  Lower thoracic wedge compression fracture status post kyphoplasty.  Left upper quadrant surgical clips.    Heart size is normal.  Prominent left pericardial fat  pad.    Aortic arch calcification.    Lung fields remain clear.    Impression      Stable chest x-ray.  No acute findings.      Electronically signed by: EMREY SAMAYOA MD  Date:     05/11/17  Time:    12:07      No results found for this or any previous visit.  No procedure found.    Diagnostic Results:  ECG: Reviewed  Marked sinus bradycardia with sinus arrhythmia   Abnormal ECG   When compared with ECG of 28-OCT-2020 16:09,   Criteria for Septal infarct are no longer Present   T wave inversion no longer evident in Anterior leads   Confirmed by MD RODERICK, BARB ([id number]) on 11/12/2020 9:25:42 PM       Results for orders placed during the hospital encounter of 05/24/22    Cardiac catheterization    Conclusion  · The estimated blood loss was none.  · There is severe mid LAD intramyocardial bridging improved with betablockers intra op  · There was no evidence of an acute atherothrombotic lesion    The procedure log was documented by Documenter: Te Waller and verified by Domingo Martins MD.    Date: 5/24/2022  Time: 9:49 AM    · The estimated PA systolic pressure is 52 mmHg.    The ASCVD Risk score ([place] DK, et al., 2019) failed to calculate for the following reasons:    The 2019 ASCVD risk score is only valid for ages 40 to 79    Risk score cannot be calculated because patient has a medical history suggesting prior/existing ASCVD    Assessment and Plan:   Carotid stenosis, bilateral    Atherosclerosis of aorta    Stress-induced cardiomyopathy    Coronary-myocardial bridge    Statin intolerance    Hypertension, unspecified type    Type 2 diabetes mellitus without complication, without long-term current use of insulin    Recovered cardiomyopathy          Increase Lasix for the next 3 days then back to 20 mg daily.  Written instructions given on nursing home papers  Blood pressure controlled.  Angina free.  Reviewed all tests and above medical conditions with patient in detail and formulated treatment plan.   Recent hospital stay with normal EKG and normal troponin.  Risk factor modification discussed.   Cardiac low salt diet discussed.  Maintaining healthy weight and weight loss goals were discussed in clinic.  Continue with Toprol.  Nonobstructive catheterization in 2022.  Done twice.    Recovered nonischemic cardiomyopathy likely secondary to stress    Follow up in 6 months

## 2025-04-21 ENCOUNTER — TELEPHONE (OUTPATIENT)
Dept: PRIMARY CARE CLINIC | Facility: CLINIC | Age: 85
End: 2025-04-21
Payer: MEDICARE

## 2025-04-21 NOTE — TELEPHONE ENCOUNTER
Phone call received from Mis regarding appt on 04/22/25.  Pt care now received from MD @ [facility].  Appt cancelled.

## 2025-04-25 ENCOUNTER — HOSPITAL ENCOUNTER (INPATIENT)
Facility: HOSPITAL | Age: 85
LOS: 4 days | Discharge: HOME | DRG: 602 | End: 2025-04-29
Attending: FAMILY MEDICINE | Admitting: INTERNAL MEDICINE
Payer: MEDICARE

## 2025-04-25 DIAGNOSIS — M00.9: ICD-10-CM

## 2025-04-25 DIAGNOSIS — L03.114 CELLULITIS OF LEFT UPPER EXTREMITY: Primary | ICD-10-CM

## 2025-04-25 DIAGNOSIS — I50.32 CHRONIC CONGESTIVE HEART FAILURE WITH LEFT VENTRICULAR DIASTOLIC DYSFUNCTION: ICD-10-CM

## 2025-04-25 DIAGNOSIS — L03.90 CELLULITIS: ICD-10-CM

## 2025-04-25 DIAGNOSIS — R00.1 BRADYCARDIA: ICD-10-CM

## 2025-04-25 PROBLEM — E87.6 HYPOKALEMIA: Status: ACTIVE | Noted: 2025-04-25

## 2025-04-25 PROBLEM — N30.01 ACUTE CYSTITIS WITH HEMATURIA: Status: ACTIVE | Noted: 2025-04-25

## 2025-04-25 PROBLEM — R79.89 ELEVATED LFTS: Status: ACTIVE | Noted: 2025-04-25

## 2025-04-25 LAB
ABSOLUTE EOSINOPHIL (OHS): 0 K/UL
ABSOLUTE MONOCYTE (OHS): 1.2 K/UL (ref 0.3–1)
ABSOLUTE NEUTROPHIL COUNT (OHS): 12.2 K/UL (ref 1.8–7.7)
ALBUMIN SERPL BCP-MCNC: 2.1 G/DL (ref 3.5–5.2)
ALP SERPL-CCNC: 123 UNIT/L (ref 40–150)
ALT SERPL W/O P-5'-P-CCNC: 104 UNIT/L (ref 10–44)
ANION GAP (OHS): 9 MMOL/L (ref 8–16)
AST SERPL-CCNC: 158 UNIT/L (ref 11–45)
BACTERIA #/AREA URNS AUTO: ABNORMAL /HPF
BASOPHILS # BLD AUTO: 0.02 K/UL
BASOPHILS NFR BLD AUTO: 0.1 %
BILIRUB SERPL-MCNC: 0.4 MG/DL (ref 0.1–1)
BILIRUB UR QL STRIP.AUTO: NEGATIVE
BNP SERPL-MCNC: 450 PG/ML (ref 0–99)
BUN SERPL-MCNC: 18 MG/DL (ref 8–23)
CALCIUM SERPL-MCNC: 8.9 MG/DL (ref 8.7–10.5)
CHLORIDE SERPL-SCNC: 105 MMOL/L (ref 95–110)
CK SERPL-CCNC: 18 U/L (ref 20–180)
CLARITY UR: ABNORMAL
CO2 SERPL-SCNC: 29 MMOL/L (ref 23–29)
COLOR UR AUTO: YELLOW
CREAT SERPL-MCNC: 0.9 MG/DL (ref 0.5–1.4)
CRP SERPL-MCNC: 225.2 MG/L
ERYTHROCYTE [DISTWIDTH] IN BLOOD BY AUTOMATED COUNT: 14.6 % (ref 11.5–14.5)
ERYTHROCYTE [SEDIMENTATION RATE] IN BLOOD: >120 MM/HR
GFR SERPLBLD CREATININE-BSD FMLA CKD-EPI: >60 ML/MIN/1.73/M2
GLUCOSE SERPL-MCNC: 192 MG/DL (ref 70–110)
GLUCOSE UR QL STRIP: NEGATIVE
HCT VFR BLD AUTO: 35.2 % (ref 37–48.5)
HGB BLD-MCNC: 11 GM/DL (ref 12–16)
HGB UR QL STRIP: ABNORMAL
HOLD SPECIMEN: NORMAL
HYALINE CASTS UR QL AUTO: 0 /LPF (ref 0–1)
IMM GRANULOCYTES # BLD AUTO: 0.06 K/UL (ref 0–0.04)
IMM GRANULOCYTES NFR BLD AUTO: 0.4 % (ref 0–0.5)
KETONES UR QL STRIP: NEGATIVE
LACTATE SERPL-SCNC: 1.6 MMOL/L (ref 0.5–2.2)
LEUKOCYTE ESTERASE UR QL STRIP: ABNORMAL
LYMPHOCYTES # BLD AUTO: 1.1 K/UL (ref 1–4.8)
MAGNESIUM SERPL-MCNC: 2 MG/DL (ref 1.6–2.6)
MCH RBC QN AUTO: 28.4 PG (ref 27–31)
MCHC RBC AUTO-ENTMCNC: 31.3 G/DL (ref 32–36)
MCV RBC AUTO: 91 FL (ref 82–98)
MICROSCOPIC COMMENT: ABNORMAL
NITRITE UR QL STRIP: POSITIVE
NUCLEATED RBC (/100WBC) (OHS): 0 /100 WBC
PH UR STRIP: 6 [PH]
PLATELET # BLD AUTO: 308 K/UL (ref 150–450)
PMV BLD AUTO: 9.8 FL (ref 9.2–12.9)
POTASSIUM SERPL-SCNC: 3.4 MMOL/L (ref 3.5–5.1)
PROCALCITONIN SERPL-MCNC: 0.22 NG/ML
PROT SERPL-MCNC: 8.2 GM/DL (ref 6–8.4)
PROT UR QL STRIP: ABNORMAL
RBC # BLD AUTO: 3.88 M/UL (ref 4–5.4)
RBC #/AREA URNS AUTO: 5 /HPF (ref 0–4)
RELATIVE EOSINOPHIL (OHS): 0 %
RELATIVE LYMPHOCYTE (OHS): 7.5 % (ref 18–48)
RELATIVE MONOCYTE (OHS): 8.2 % (ref 4–15)
RELATIVE NEUTROPHIL (OHS): 83.8 % (ref 38–73)
SODIUM SERPL-SCNC: 143 MMOL/L (ref 136–145)
SP GR UR STRIP: 1.02
SQUAMOUS #/AREA URNS AUTO: 3 /HPF
TROPONIN I SERPL DL<=0.01 NG/ML-MCNC: 0.04 NG/ML
TROPONIN I SERPL DL<=0.01 NG/ML-MCNC: 0.06 NG/ML
URATE SERPL-MCNC: 5.7 MG/DL (ref 2.4–5.7)
UROBILINOGEN UR STRIP-ACNC: ABNORMAL EU/DL
WBC # BLD AUTO: 14.58 K/UL (ref 3.9–12.7)
WBC #/AREA URNS AUTO: >100 /HPF (ref 0–5)
WBC CLUMPS UR QL AUTO: ABNORMAL

## 2025-04-25 PROCEDURE — 25000003 PHARM REV CODE 250: Performed by: INTERNAL MEDICINE

## 2025-04-25 PROCEDURE — 84484 ASSAY OF TROPONIN QUANT: CPT | Performed by: FAMILY MEDICINE

## 2025-04-25 PROCEDURE — 86140 C-REACTIVE PROTEIN: CPT | Performed by: FAMILY MEDICINE

## 2025-04-25 PROCEDURE — 84550 ASSAY OF BLOOD/URIC ACID: CPT | Performed by: FAMILY MEDICINE

## 2025-04-25 PROCEDURE — 85025 COMPLETE CBC W/AUTO DIFF WBC: CPT | Performed by: FAMILY MEDICINE

## 2025-04-25 PROCEDURE — 96365 THER/PROPH/DIAG IV INF INIT: CPT

## 2025-04-25 PROCEDURE — 25000242 PHARM REV CODE 250 ALT 637 W/ HCPCS: Performed by: FAMILY MEDICINE

## 2025-04-25 PROCEDURE — 84145 PROCALCITONIN (PCT): CPT | Performed by: FAMILY MEDICINE

## 2025-04-25 PROCEDURE — 82550 ASSAY OF CK (CPK): CPT | Performed by: NURSE PRACTITIONER

## 2025-04-25 PROCEDURE — 80053 COMPREHEN METABOLIC PANEL: CPT | Performed by: FAMILY MEDICINE

## 2025-04-25 PROCEDURE — 83605 ASSAY OF LACTIC ACID: CPT | Performed by: FAMILY MEDICINE

## 2025-04-25 PROCEDURE — 87040 BLOOD CULTURE FOR BACTERIA: CPT | Performed by: FAMILY MEDICINE

## 2025-04-25 PROCEDURE — 21400001 HC TELEMETRY ROOM

## 2025-04-25 PROCEDURE — 36415 COLL VENOUS BLD VENIPUNCTURE: CPT | Performed by: FAMILY MEDICINE

## 2025-04-25 PROCEDURE — 87186 SC STD MICRODIL/AGAR DIL: CPT | Performed by: FAMILY MEDICINE

## 2025-04-25 PROCEDURE — 96375 TX/PRO/DX INJ NEW DRUG ADDON: CPT

## 2025-04-25 PROCEDURE — 63600175 PHARM REV CODE 636 W HCPCS: Performed by: INTERNAL MEDICINE

## 2025-04-25 PROCEDURE — 25000003 PHARM REV CODE 250: Performed by: FAMILY MEDICINE

## 2025-04-25 PROCEDURE — 83880 ASSAY OF NATRIURETIC PEPTIDE: CPT | Performed by: FAMILY MEDICINE

## 2025-04-25 PROCEDURE — 84484 ASSAY OF TROPONIN QUANT: CPT | Performed by: NURSE PRACTITIONER

## 2025-04-25 PROCEDURE — 63600175 PHARM REV CODE 636 W HCPCS: Performed by: NURSE PRACTITIONER

## 2025-04-25 PROCEDURE — 36415 COLL VENOUS BLD VENIPUNCTURE: CPT | Performed by: NURSE PRACTITIONER

## 2025-04-25 PROCEDURE — 85652 RBC SED RATE AUTOMATED: CPT | Performed by: FAMILY MEDICINE

## 2025-04-25 PROCEDURE — 81001 URINALYSIS AUTO W/SCOPE: CPT | Performed by: FAMILY MEDICINE

## 2025-04-25 PROCEDURE — 63600175 PHARM REV CODE 636 W HCPCS: Performed by: FAMILY MEDICINE

## 2025-04-25 PROCEDURE — 25000003 PHARM REV CODE 250: Performed by: NURSE PRACTITIONER

## 2025-04-25 PROCEDURE — 93010 ELECTROCARDIOGRAM REPORT: CPT | Mod: ,,, | Performed by: INTERNAL MEDICINE

## 2025-04-25 PROCEDURE — 93005 ELECTROCARDIOGRAM TRACING: CPT

## 2025-04-25 PROCEDURE — 99285 EMERGENCY DEPT VISIT HI MDM: CPT | Mod: 25

## 2025-04-25 PROCEDURE — 83735 ASSAY OF MAGNESIUM: CPT | Performed by: FAMILY MEDICINE

## 2025-04-25 PROCEDURE — 94640 AIRWAY INHALATION TREATMENT: CPT

## 2025-04-25 PROCEDURE — 96361 HYDRATE IV INFUSION ADD-ON: CPT

## 2025-04-25 RX ORDER — IPRATROPIUM BROMIDE AND ALBUTEROL SULFATE 2.5; .5 MG/3ML; MG/3ML
3 SOLUTION RESPIRATORY (INHALATION) ONCE
Status: COMPLETED | OUTPATIENT
Start: 2025-04-25 | End: 2025-04-25

## 2025-04-25 RX ORDER — IBUPROFEN 200 MG
24 TABLET ORAL
Status: DISCONTINUED | OUTPATIENT
Start: 2025-04-25 | End: 2025-04-29 | Stop reason: HOSPADM

## 2025-04-25 RX ORDER — METOPROLOL SUCCINATE 25 MG/1
25 TABLET, EXTENDED RELEASE ORAL DAILY
Status: DISCONTINUED | OUTPATIENT
Start: 2025-04-26 | End: 2025-04-26

## 2025-04-25 RX ORDER — COLCHICINE 0.6 MG/1
1.2 TABLET, FILM COATED ORAL
Status: COMPLETED | OUTPATIENT
Start: 2025-04-25 | End: 2025-04-25

## 2025-04-25 RX ORDER — POTASSIUM CHLORIDE 20 MEQ/1
40 TABLET, EXTENDED RELEASE ORAL ONCE
Status: COMPLETED | OUTPATIENT
Start: 2025-04-25 | End: 2025-04-25

## 2025-04-25 RX ORDER — POLYETHYLENE GLYCOL 3350 17 G/17G
17 POWDER, FOR SOLUTION ORAL DAILY PRN
Status: DISCONTINUED | OUTPATIENT
Start: 2025-04-25 | End: 2025-04-29 | Stop reason: HOSPADM

## 2025-04-25 RX ORDER — LABETALOL HYDROCHLORIDE 5 MG/ML
10 INJECTION, SOLUTION INTRAVENOUS
Status: COMPLETED | OUTPATIENT
Start: 2025-04-25 | End: 2025-04-25

## 2025-04-25 RX ORDER — HYDRALAZINE HYDROCHLORIDE 20 MG/ML
10 INJECTION INTRAMUSCULAR; INTRAVENOUS EVERY 6 HOURS PRN
Status: DISCONTINUED | OUTPATIENT
Start: 2025-04-25 | End: 2025-04-29 | Stop reason: HOSPADM

## 2025-04-25 RX ORDER — ONDANSETRON HYDROCHLORIDE 2 MG/ML
4 INJECTION, SOLUTION INTRAVENOUS EVERY 8 HOURS PRN
Status: DISCONTINUED | OUTPATIENT
Start: 2025-04-25 | End: 2025-04-29 | Stop reason: HOSPADM

## 2025-04-25 RX ORDER — DONEPEZIL HYDROCHLORIDE 5 MG/1
10 TABLET, FILM COATED ORAL NIGHTLY
Status: DISCONTINUED | OUTPATIENT
Start: 2025-04-26 | End: 2025-04-29 | Stop reason: HOSPADM

## 2025-04-25 RX ORDER — PREDNISONE 50 MG/1
50 TABLET ORAL ONCE
Status: COMPLETED | OUTPATIENT
Start: 2025-04-25 | End: 2025-04-25

## 2025-04-25 RX ORDER — IBUPROFEN 200 MG
16 TABLET ORAL
Status: DISCONTINUED | OUTPATIENT
Start: 2025-04-25 | End: 2025-04-29 | Stop reason: HOSPADM

## 2025-04-25 RX ORDER — SODIUM CHLORIDE 0.9 % (FLUSH) 0.9 %
3 SYRINGE (ML) INJECTION EVERY 8 HOURS PRN
Status: DISCONTINUED | OUTPATIENT
Start: 2025-04-25 | End: 2025-04-29 | Stop reason: HOSPADM

## 2025-04-25 RX ORDER — NALOXONE HCL 0.4 MG/ML
0.02 VIAL (ML) INJECTION
Status: DISCONTINUED | OUTPATIENT
Start: 2025-04-25 | End: 2025-04-29 | Stop reason: HOSPADM

## 2025-04-25 RX ORDER — ACETAMINOPHEN 500 MG
1000 TABLET ORAL
Status: COMPLETED | OUTPATIENT
Start: 2025-04-25 | End: 2025-04-25

## 2025-04-25 RX ORDER — OLANZAPINE 5 MG/1
5 TABLET, FILM COATED ORAL NIGHTLY
Status: DISCONTINUED | OUTPATIENT
Start: 2025-04-25 | End: 2025-04-29 | Stop reason: HOSPADM

## 2025-04-25 RX ORDER — FLUOXETINE 10 MG/1
10 CAPSULE ORAL DAILY
COMMUNITY
Start: 2025-04-10

## 2025-04-25 RX ORDER — GLUCAGON 1 MG
1 KIT INJECTION
Status: DISCONTINUED | OUTPATIENT
Start: 2025-04-25 | End: 2025-04-29 | Stop reason: HOSPADM

## 2025-04-25 RX ORDER — SODIUM CHLORIDE 9 MG/ML
INJECTION, SOLUTION INTRAVENOUS CONTINUOUS
Status: DISCONTINUED | OUTPATIENT
Start: 2025-04-25 | End: 2025-04-27

## 2025-04-25 RX ORDER — FLUOXETINE 10 MG/1
10 CAPSULE ORAL DAILY
Status: DISCONTINUED | OUTPATIENT
Start: 2025-04-26 | End: 2025-04-29 | Stop reason: HOSPADM

## 2025-04-25 RX ADMIN — PIPERACILLIN SODIUM AND TAZOBACTAM SODIUM 4.5 G: 4; .5 INJECTION, POWDER, FOR SOLUTION INTRAVENOUS at 06:04

## 2025-04-25 RX ADMIN — COLCHICINE 1.2 MG: 0.6 TABLET ORAL at 07:04

## 2025-04-25 RX ADMIN — POTASSIUM CHLORIDE 40 MEQ: 1500 TABLET, EXTENDED RELEASE ORAL at 09:04

## 2025-04-25 RX ADMIN — LABETALOL HYDROCHLORIDE 10 MG: 5 INJECTION INTRAVENOUS at 05:04

## 2025-04-25 RX ADMIN — ACETAMINOPHEN 1000 MG: 500 TABLET ORAL at 05:04

## 2025-04-25 RX ADMIN — OLANZAPINE 5 MG: 5 TABLET, FILM COATED ORAL at 09:04

## 2025-04-25 RX ADMIN — VANCOMYCIN HYDROCHLORIDE 1500 MG: 1.5 INJECTION, POWDER, LYOPHILIZED, FOR SOLUTION INTRAVENOUS at 09:04

## 2025-04-25 RX ADMIN — SODIUM CHLORIDE 1000 ML: 9 INJECTION, SOLUTION INTRAVENOUS at 05:04

## 2025-04-25 RX ADMIN — SODIUM CHLORIDE: 9 INJECTION, SOLUTION INTRAVENOUS at 09:04

## 2025-04-25 RX ADMIN — PREDNISONE 50 MG: 50 TABLET ORAL at 09:04

## 2025-04-25 RX ADMIN — IPRATROPIUM BROMIDE AND ALBUTEROL SULFATE 3 ML: 2.5; .5 SOLUTION RESPIRATORY (INHALATION) at 07:04

## 2025-04-25 NOTE — ED PROVIDER NOTES
SCRIBE #1 NOTE: I, Tai Spicerwanda, am scribing for, and in the presence of, Jayden Conklin MD. I have scribed the entire note.       History     Chief Complaint   Patient presents with    Cellulitis     Pt states she has cellulitis of her wrist.      Review of patient's allergies indicates:   Allergen Reactions    Amitriptyline     Hydrochlorothiazide      Causes muscle cramping    Lisinopril      hyperkalemia    Opioids - morphine analogues Hallucinations     Chest pain and hallucinations    Oxycodone     Percocet [oxycodone-acetaminophen] Other (See Comments)     Seizures    Belbuca [buprenorphine hcl] Nausea And Vomiting and Other (See Comments)     Black out     Codeine Nausea Only and Rash    Prazosin Other (See Comments)     dizziness         History of Present Illness     HPI    4/25/2025, 4:31 PM  History obtained from the patient and medical records    HPI limited due to pt's hx of dementia.      History of Present Illness: Leslie Wood is a 84 y.o. female patient with a PMHx of HTN, arthritis, cataract, GERD, stroke, MI, Alzheimer's, and dementia who presents to the Emergency Department for evaluation of a rash to the L wrist which onset an unknown amount of time prior to arrival. Pt denies any pain and all other sxs at this time. No further complaints or concerns at this time.       Arrival mode: Ambulance Service    PCP: Natalia Gomez MD        Past Medical History:  Past Medical History:   Diagnosis Date    Alzheimer's disease, unspecified (CODE)     Arthritis     Cataract     Dementia without behavioral disturbance     GERD (gastroesophageal reflux disease)     Heart attack 05/23/2022    Hypertension     Stroke        Past Surgical History:  Past Surgical History:   Procedure Laterality Date    ADENOIDECTOMY      ADRENAL GLAND SURGERY      APPENDECTOMY      BACK SURGERY      fusion l 4-5 s 1,2,3  fusion l 2-3    CORONARY ANGIOGRAPHY N/A 05/20/2022    Procedure: ANGIOGRAM, CORONARY ARTERY;   Surgeon: Domingo Martins MD;  Location: [facility] CATH LAB;  Service: Cardiology;  Laterality: N/A;    CORONARY ANGIOGRAPHY N/A 05/24/2022    Procedure: ANGIOGRAM, CORONARY ARTERY;  Surgeon: Domingo Martins MD;  Location: [facility] CATH LAB;  Service: Cardiology;  Laterality: N/A;    EYE SURGERY      HEMORRHOID SURGERY      HERNIA REPAIR      HYSTERECTOMY      partial    indirect lumbar decompression      percutaneous placement of interspinous extension blocker    LEFT HEART CATHETERIZATION Left 05/20/2022    Procedure: CATHETERIZATION, HEART, LEFT;  Surgeon: Domingo Martins MD;  Location: [facility] CATH LAB;  Service: Cardiology;  Laterality: Left;    TONSILLECTOMY           Family History:  Family History   Problem Relation Name Age of Onset    Heart disease Mother      Hypertension Mother      Diabetes Mother      Hypertension Father      Kidney disease Father      Breast cancer Sister         Social History:  Social History     Tobacco Use    Smoking status: Never    Smokeless tobacco: Never   Substance and Sexual Activity    Alcohol use: No    Drug use: No    Sexual activity: Not Currently        Review of Systems     Review of Systems   Unable to perform ROS: Dementia   Skin:  Positive for rash (non-painful; L wrist).        Physical Exam     Initial Vitals [04/25/25 1518]   BP Pulse Resp Temp SpO2   (!) 210/82 82 20 (!) 100.8 °F (38.2 °C) 98 %      MAP       --          Physical Exam  Nursing Notes and Vital Signs Reviewed.  Constitutional: Patient is in no acute distress. Well-developed and well-nourished.  Head: Atraumatic. Normocephalic.  Eyes:  EOM intact.  No scleral icterus.  ENT: Mucous membranes are moist.  Nares clear   Neck:  Full ROM. No JVD.  Cardiovascular: Regular rate. Regular rhythm No murmurs, rubs, or gallops. Distal pulses are 2+ and symmetric  Pulmonary/Chest: No respiratory distress. Clear to auscultation bilaterally. No wheezing or rales.  Equal chest wall rise bilaterally  Abdominal: Soft and  non-distended.  There is no tenderness.  No rebound, guarding, or rigidity. Good bowel sounds.  Genitourinary: No CVA tenderness.  No suprapubic tenderness  Musculoskeletal: Moves all extremities. No obvious deformities.  5 x 5 strength in all extremities  Skin: Warm and dry. Erythema and lymphangitis to L wrist. No tenderness to affected area.  Neurological:  Dementia.  Psychiatric: Unable to examine due to dementia.       ED Course   Critical Care    Date/Time: 4/25/2025 7:03 PM    Performed by: Jayden Conklin MD  Authorized by: Jayden Conklin MD  Direct patient critical care time: 15 minutes  Additional history critical care time: 10 minutes  Ordering / reviewing critical care time: 15 minutes  Documentation critical care time: 10 minutes  Consulting other physicians critical care time: 5 minutes  Total critical care time (exclusive of procedural time) : 55 minutes  Critical care time was exclusive of separately billable procedures and treating other patients and teaching time.  Critical care was necessary to treat or prevent imminent or life-threatening deterioration of the following conditions: HTN emergency.  Critical care was time spent personally by me on the following activities: blood draw for specimens, development of treatment plan with patient or surrogate, discussions with consultants, interpretation of cardiac output measurements, evaluation of patient's response to treatment, examination of patient, obtaining history from patient or surrogate, ordering and performing treatments and interventions, ordering and review of laboratory studies, ordering and review of radiographic studies, pulse oximetry, re-evaluation of patient's condition and review of old charts.        ED Vital Signs:  Vitals:    04/25/25 1518 04/25/25 1618 04/25/25 1720 04/25/25 1724   BP: (!) 210/82 (!) 224/93  (!) 202/89   Pulse: 82 73     Resp: 20 16     Temp: (!) 100.8 °F (38.2 °C)  (!) 100.8 °F (38.2 °C)   "  TempSrc: Oral      SpO2: 98% 96%     Weight:       Height: 5' 1" (1.549 m)       04/25/25 1803 04/25/25 1838 04/25/25 1901 04/25/25 1910   BP: (!) 145/66 127/60 (!) 117/58    Pulse: 67 63 61 61   Resp: (!) 29  (!) 22    Temp:   98.1 °F (36.7 °C)    TempSrc:   Oral    SpO2: (!) 93%  95%    Weight:       Height:        04/25/25 1924 04/25/25 1947 04/25/25 1950 04/25/25 1957   BP:  (!) 119/53     Pulse: (!) 59 60 60 64   Resp: 18 20     Temp:  97.6 °F (36.4 °C)     TempSrc:  Oral     SpO2: 95% (!) 94%     Weight:       Height:        04/25/25 2011 04/26/25 0014 04/26/25 0158   BP:  (!) 154/70    Pulse:  (!) 47 (!) 39   Resp:  18    Temp:  97 °F (36.1 °C)    TempSrc:  Axillary    SpO2:  96%    Weight: 58.2 kg (128 lb 4.9 oz)     Height:          Abnormal Lab Results:  Labs Reviewed   B-TYPE NATRIURETIC PEPTIDE - Abnormal       Result Value     (*)    COMPREHENSIVE METABOLIC PANEL - Abnormal    Sodium 143      Potassium 3.4 (*)     Chloride 105      CO2 29      Glucose 192 (*)     BUN 18      Creatinine 0.9      Calcium 8.9      Protein Total 8.2      Albumin 2.1 (*)     Bilirubin Total 0.4             (*)      (*)     Anion Gap 9      eGFR >60     TROPONIN I - Abnormal    Troponin-I 0.040 (*)    URINALYSIS, REFLEX TO URINE CULTURE - Abnormal    Color, UA Yellow      Appearance, UA Hazy (*)     pH, UA 6.0      Spec Grav UA 1.025      Protein, UA 2+ (*)     Glucose, UA Negative      Ketones, UA Negative      Bilirubin, UA Negative      Blood, UA Trace (*)     Nitrites, UA Positive (*)     Urobilinogen, UA 2.0-3.0 (*)     Leukocyte Esterase, UA 3+ (*)    CBC WITH DIFFERENTIAL - Abnormal    WBC 14.58 (*)     RBC 3.88 (*)     HGB 11.0 (*)     HCT 35.2 (*)     MCV 91      MCH 28.4      MCHC 31.3 (*)     RDW 14.6 (*)     Platelet Count 308      MPV 9.8      Nucleated RBC 0      Neut % 83.8 (*)     Lymph % 7.5 (*)     Mono % 8.2      Eos % 0.0      Basophil % 0.1      Imm Grans % 0.4      Neut # " 12.20 (*)     Lymph # 1.10      Mono # 1.20 (*)     Eos # 0.00      Baso # 0.02      Imm Grans # 0.06 (*)    C-REACTIVE PROTEIN - Abnormal    .2 (*)    SEDIMENTATION RATE - Abnormal    Sed Rate >120 (*)    URINALYSIS MICROSCOPIC - Abnormal    RBC, UA 5 (*)     WBC, UA >100 (*)     WBC Clumps, UA Few (*)     Bacteria, UA Many (*)     Squamous Epithelial Cells, UA 3      Hyaline Casts, UA 0      Microscopic Comment       LACTIC ACID, PLASMA - Normal    Lactic Acid Level 1.6      Narrative:     Falsely low lactic acid results can be found in samples containing >=13.0 mg/dL total bilirubin and/or >=3.5 mg/dL direct bilirubin.    MAGNESIUM - Normal    Magnesium  2.0     PROCALCITONIN - Normal    Procalcitonin 0.22     URIC ACID - Normal    Uric Acid 5.7     CULTURE, BLOOD   CULTURE, BLOOD   CULTURE, URINE   CBC W/ AUTO DIFFERENTIAL    Narrative:     The following orders were created for panel order CBC auto differential.  Procedure                               Abnormality         Status                     ---------                               -----------         ------                     CBC with Differential[[id number]]       Abnormal            Final result                 Please view results for these tests on the individual orders.   EXTRA TUBES    Narrative:     The following orders were created for panel order EXTRA TUBES.  Procedure                               Abnormality         Status                     ---------                               -----------         ------                     Light Blue Top Hold[[id number]]                             Final result               Light Green Top Hold[[id number]]                            Final result               Gold Top Hold[[id number]]                                   Final result                 Please view results for these tests on the individual orders.   LIGHT BLUE TOP HOLD    Extra Tube Hold for add-ons.     LIGHT GREEN TOP HOLD    Extra  Tube Hold for add-ons.     GOLD TOP HOLD    Extra Tube Hold for add-ons.     GREY TOP URINE HOLD    Extra Tube Hold for add-ons.          All Lab Results:  Results for orders placed or performed during the hospital encounter of 04/25/25   Brain natriuretic peptide    Collection Time: 04/25/25  4:51 PM   Result Value Ref Range     (H) 0 - 99 pg/mL   Comprehensive metabolic panel    Collection Time: 04/25/25  4:51 PM   Result Value Ref Range    Sodium 143 136 - 145 mmol/L    Potassium 3.4 (L) 3.5 - 5.1 mmol/L    Chloride 105 95 - 110 mmol/L    CO2 29 23 - 29 mmol/L    Glucose 192 (H) 70 - 110 mg/dL    BUN 18 8 - 23 mg/dL    Creatinine 0.9 0.5 - 1.4 mg/dL    Calcium 8.9 8.7 - 10.5 mg/dL    Protein Total 8.2 6.0 - 8.4 gm/dL    Albumin 2.1 (L) 3.5 - 5.2 g/dL    Bilirubin Total 0.4 0.1 - 1.0 mg/dL     40 - 150 unit/L     (H) 11 - 45 unit/L     (H) 10 - 44 unit/L    Anion Gap 9 8 - 16 mmol/L    eGFR >60 >60 mL/min/1.73/m2   Lactic acid, plasma    Collection Time: 04/25/25  4:51 PM   Result Value Ref Range    Lactic Acid Level 1.6 0.5 - 2.2 mmol/L   Magnesium    Collection Time: 04/25/25  4:51 PM   Result Value Ref Range    Magnesium  2.0 1.6 - 2.6 mg/dL   Troponin I    Collection Time: 04/25/25  4:51 PM   Result Value Ref Range    Troponin-I 0.040 (H) <=0.026 ng/mL   Procalcitonin    Collection Time: 04/25/25  4:51 PM   Result Value Ref Range    Procalcitonin 0.22 <0.25 ng/mL   CBC with Differential    Collection Time: 04/25/25  4:51 PM   Result Value Ref Range    WBC 14.58 (H) 3.90 - 12.70 K/uL    RBC 3.88 (L) 4.00 - 5.40 M/uL    HGB 11.0 (L) 12.0 - 16.0 gm/dL    HCT 35.2 (L) 37.0 - 48.5 %    MCV 91 82 - 98 fL    MCH 28.4 27.0 - 31.0 pg    MCHC 31.3 (L) 32.0 - 36.0 g/dL    RDW 14.6 (H) 11.5 - 14.5 %    Platelet Count 308 150 - 450 K/uL    MPV 9.8 9.2 - 12.9 fL    Nucleated RBC 0 <=0 /100 WBC    Neut % 83.8 (H) 38 - 73 %    Lymph % 7.5 (L) 18 - 48 %    Mono % 8.2 4 - 15 %    Eos % 0.0 <=8 %     Basophil % 0.1 <=1.9 %    Imm Grans % 0.4 0.0 - 0.5 %    Neut # 12.20 (H) 1.8 - 7.7 K/uL    Lymph # 1.10 1 - 4.8 K/uL    Mono # 1.20 (H) 0.3 - 1 K/uL    Eos # 0.00 <=0.5 K/uL    Baso # 0.02 <=0.2 K/uL    Imm Grans # 0.06 (H) 0.00 - 0.04 K/uL   C-reactive protein    Collection Time: 04/25/25  4:51 PM   Result Value Ref Range    .2 (H) <=8.2 mg/L   Sedimentation rate    Collection Time: 04/25/25  4:51 PM   Result Value Ref Range    Sed Rate >120 (H) <=36 mm/hr   Uric acid    Collection Time: 04/25/25  4:51 PM   Result Value Ref Range    Uric Acid 5.7 2.4 - 5.7 mg/dL   Light Blue Top Hold    Collection Time: 04/25/25  4:51 PM   Result Value Ref Range    Extra Tube Hold for add-ons.    Light Green Top Hold    Collection Time: 04/25/25  4:51 PM   Result Value Ref Range    Extra Tube Hold for add-ons.    Gold Top Hold    Collection Time: 04/25/25  4:51 PM   Result Value Ref Range    Extra Tube Hold for add-ons.    Urinalysis, Reflex to Urine Culture    Collection Time: 04/25/25  5:04 PM    Specimen: Urine, Clean Catch   Result Value Ref Range    Color, UA Yellow Straw, Izabela, Yellow, Light-Orange    Appearance, UA Hazy (A) Clear    pH, UA 6.0 5.0 - 8.0    Spec Grav UA 1.025 1.005 - 1.030    Protein, UA 2+ (A) Negative    Glucose, UA Negative Negative    Ketones, UA Negative Negative    Bilirubin, UA Negative Negative    Blood, UA Trace (A) Negative    Nitrites, UA Positive (A) Negative    Urobilinogen, UA 2.0-3.0 (A) <2.0 EU/dL    Leukocyte Esterase, UA 3+ (A) Negative   GREY TOP URINE HOLD    Collection Time: 04/25/25  5:04 PM   Result Value Ref Range    Extra Tube Hold for add-ons.    Urinalysis Microscopic    Collection Time: 04/25/25  5:04 PM   Result Value Ref Range    RBC, UA 5 (H) 0 - 4 /HPF    WBC, UA >100 (H) 0 - 5 /HPF    WBC Clumps, UA Few (A) None, Rare    Bacteria, UA Many (A) None, Rare, Occasional /HPF    Squamous Epithelial Cells, UA 3 /HPF    Hyaline Casts, UA 0 0 - 1 /LPF    Microscopic Comment      CK    Collection Time: 04/25/25  8:25 PM   Result Value Ref Range    CPK 18 (L) 20 - 180 U/L   Troponin I    Collection Time: 04/25/25  8:25 PM   Result Value Ref Range    Troponin-I 0.056 (H) <=0.026 ng/mL       Imaging Results:  Imaging Results              US Abdomen Limited (Final result)  Result time 04/25/25 20:12:17      Final result by Travis Villagomez DO (04/25/25 20:12:17)                   Impression:     No acute findings.        Finalized on: 4/25/2025 8:12 PM By:  Travis Villagomez  [facility]# [id number]      2025-04-25 20:14:20.755     [facility]               Narrative:      Exam: Gallbladder ultrasound.    Comparison: none    Clinical Indication: Elevated liver function tests    TECHNIQUE: Grayscale and Doppler imaging was obtained.    Findings: Liver is normal in size and echogenicity without focal mass or intrahepatic biliary dilatation.    Visualized portions of the pancreas are unremarkable.     Right kidney measures 9.7 cm in greatest craniocaudal dimension. There is no hydronephrosis or nephrolithiasis.    Cholecystectomy Common bile duct measures 12 mm    4.3 x 3.5 x 4.3 cm circumscribed cystic structure with a distinct fat fluid level in the anterior abdomen possibly a lipoma.                                         X-Ray Hand 3 View Left (Final result)  Result time 04/25/25 17:23:07      Final result by Daniel Penn MD (04/25/25 17:23:07)                   Impression:     As above    Finalized on: 4/25/2025 5:23 PM By:  Daniel Penn MD  [facility]# [id number]      2025-04-25 17:25:11.954     [facility]               Narrative:    EXAM: XR HAND COMPLETE 3 VIEW LEFT    CLINICAL HISTORY: Left hand pain.    FINDINGS: No fracture is evident.  Joint alignment is anatomic.  Diffuse osteopenia.  Severe degenerative changes of the first carpometacarpal joint.  Mild degenerative changes scattered throughout the hand and radiocarpal joint.  Mild soft tissue swelling .                                         X-Ray Wrist  Complete Left (Final result)  Result time 04/25/25 17:22:04      Final result by Daniel Penn MD (04/25/25 17:22:04)                   Impression:     As above    Finalized on: 4/25/2025 5:22 PM By:  Daniel Penn MD  [facility]# [id number]      2025-04-25 17:24:11.876     [facility]               Narrative:    EXAM:  XR WRIST COMPLETE 3 VIEWS LEFT    CLINICAL HISTORY:  Left wrist pain.    FINDINGS: No fracture is identified.  Moderate severe osteoarthritis at the first carpal metacarpal joint.  Mild osteoarthritis radiocarpal joint.  Osteopenia and diffuse soft tissue swelling.  Joint spaces appear relatively well maintained.                                         X-Ray Chest 1 View (Final result)  Result time 04/25/25 18:34:29      Final result by Shar Sanz MD (04/25/25 18:34:29)                   Impression:      1.  Negative for acute process involving the chest.    2.  Stable findings as noted above.      Electronically signed by: Shar Sanz MD  Date:    04/25/2025  Time:    18:34               Narrative:    EXAMINATION:  XR CHEST 1 VIEW    CLINICAL HISTORY:  Cellulitis, unspecified    COMPARISON:  Studies dating back to May 9, 2017    FINDINGS:  EKG leads overlie the chest, which is lordotic in position.  The lungs are clear. The cardiac silhouette size is normal. The trachea is midline and the mediastinal width is normal. Negative for focal infiltrate, effusion or pneumothorax. Pulmonary vasculature is normal. Negative for osseous abnormalities. Stable multiple spine stimulators.  Tortuous aorta with calcifications of the aortic knob.  There are degenerative changes of the spine and both shoulder girdles.  Stable left upper quadrant postoperative changes.                                       The EKG was ordered, reviewed, and independently interpreted by the ED provider.  Interpretation time: 5:25 pm  Rate: 76 BPM  Rhythm: normal sinus rhythm  Interpretation: No STEMI.             The Emergency Provider  reviewed the vital signs and test results, which are outlined above.     ED Discussion     7:02 PM: Discussed case with Nilsa Staton NP ([facility]). Dr. Nino agrees with current care and management of pt and accepts admission.   Admitting Service: [facility]  Admitting Physician: Dr. Nino  Admit to: m/t    7:02 PM: Re-evaluated pt. I have discussed test results, shared treatment plan, and the need for admission with patient/family/caretaker at bedside. Pt and/or family/caretaker express understanding at this time and agree with all information. All questions answered. Pt/caretaker/family member(s) have no further questions or concerns at this time. Pt is ready for admit.       Medical Decision Making  84-year-old white female nursing home patient with a history of febrile illness.  She has erythema of the left hand and forearm.  Likely cellulitis versus acute gout.    Septic workup in progress.  Patient would not be a candidate for 30 cc/kilogram IV fluid bolus due to history of cardiomyopathy.  We will gently hydrate.  She has a known history of CKD 3.    Blood cultures obtained.  Patient will be started on Zosyn and vanc.  She has a history of CKD 3.    Patient has a past history of hyperuricemia.  Today's uric acid was 5.3.  Markedly elevated CRP and sed rate.  Radiographs were consistent with DJD.    Patient likely a cellulitis.  Remotely consider this is acute gouty episode with a systemic response.  She has a history of CKD 3 but today's creatinine was normal.  I will give her a dose of colchicine in the ED.    She has a urinary tract infection culture sensitivity is pending.  She has a probable cellulitis versus gout in the left wrist and hand.  She was given a dose of Zosyn to cover skin infection and urine.    Amount and/or Complexity of Data Reviewed  Labs: ordered. Decision-making details documented in ED Course.  Radiology: ordered. Decision-making details documented in ED  Course.  ECG/medicine tests: ordered and independent interpretation performed. Decision-making details documented in ED Course.    Risk  OTC drugs.  Prescription drug management.  Decision regarding hospitalization.    Critical Care  Total time providing critical care: 55 minutes                ED Medication(s):  Medications   vancomycin - pharmacy to dose (has no administration in time range)   piperacillin-tazobactam (ZOSYN) 4.5 g in D5W 100 mL IVPB (MB+) (4.5 g Intravenous New Bag 4/26/25 0138)   sodium chloride 0.9% flush 3 mL (has no administration in time range)   ondansetron injection 4 mg (has no administration in time range)   polyethylene glycol packet 17 g (has no administration in time range)   naloxone 0.4 mg/mL injection 0.02 mg (has no administration in time range)   glucose chewable tablet 16 g (has no administration in time range)   glucose chewable tablet 24 g (has no administration in time range)   dextrose 50% injection 12.5 g (has no administration in time range)   dextrose 50% injection 25 g (has no administration in time range)   glucagon (human recombinant) injection 1 mg (has no administration in time range)   donepeziL tablet 10 mg (has no administration in time range)   FLUoxetine capsule 10 mg (has no administration in time range)   OLANZapine tablet 5 mg (5 mg Oral Given 4/25/25 2100)   0.9% NaCl infusion ( Intravenous New Bag 4/25/25 2109)   hydrALAZINE injection 10 mg (has no administration in time range)   vancomycin 750 mg in 0.9% NaCl 250 mL IVPB (admixture device) (750 mg Intravenous Trough Due As Scheduled Before Dose 4/27/25 2000)   sodium chloride 0.9% bolus 1,000 mL 1,000 mL (0 mLs Intravenous Stopped 4/25/25 1908)   acetaminophen tablet 1,000 mg (1,000 mg Oral Given 4/25/25 1720)   labetaloL injection 10 mg (10 mg Intravenous Given 4/25/25 1724)   piperacillin-tazobactam (ZOSYN) 4.5 g in D5W 100 mL IVPB (MB+) (0 g Intravenous Stopped 4/25/25 1908)   colchicine capsule/tablet  1.2 mg (1.2 mg Oral Given 4/25/25 1925)   albuterol-ipratropium 2.5 mg-0.5 mg/3 mL nebulizer solution 3 mL (3 mLs Nebulization Given 4/25/25 1924)   potassium chloride SA CR tablet 40 mEq (40 mEq Oral Given 4/25/25 2100)   vancomycin 1,500 mg in 0.9% NaCl 250 mL IVPB (admixture device) (0 mg Intravenous Stopped 4/25/25 2248)   predniSONE tablet 50 mg (50 mg Oral Given 4/25/25 2124)       Current Discharge Medication List                  Scribe Attestation:   Scribe #1: I performed the above scribed service and the documentation accurately describes the services I performed. I attest to the accuracy of the note.     Attending:   Physician Attestation Statement for Scribe #1: I, Jayden Conklin MD, personally performed the services described in this documentation, as scribed by Tai Felipe, in my presence, and it is both accurate and complete.           Clinical Impression       ICD-10-CM ICD-9-CM   1. Cellulitis of left upper extremity  L03.114 682.3   2. Cellulitis  L03.90 682.9       Disposition:   Disposition: Admitted  Condition: Fair       Jayden Conklin MD  04/26/25 3883

## 2025-04-26 LAB
ABSOLUTE EOSINOPHIL (OHS): 0 K/UL
ABSOLUTE MONOCYTE (OHS): 0.55 K/UL (ref 0.3–1)
ABSOLUTE NEUTROPHIL COUNT (OHS): 8.18 K/UL (ref 1.8–7.7)
ALBUMIN SERPL BCP-MCNC: 1.6 G/DL (ref 3.5–5.2)
ALP SERPL-CCNC: 92 UNIT/L (ref 40–150)
ALT SERPL W/O P-5'-P-CCNC: 68 UNIT/L (ref 10–44)
ANION GAP (OHS): 7 MMOL/L (ref 8–16)
AST SERPL-CCNC: 76 UNIT/L (ref 11–45)
BASOPHILS # BLD AUTO: 0.01 K/UL
BASOPHILS NFR BLD AUTO: 0.1 %
BILIRUB SERPL-MCNC: 0.4 MG/DL (ref 0.1–1)
BUN SERPL-MCNC: 21 MG/DL (ref 8–23)
CALCIUM SERPL-MCNC: 8.3 MG/DL (ref 8.7–10.5)
CHLORIDE SERPL-SCNC: 110 MMOL/L (ref 95–110)
CO2 SERPL-SCNC: 26 MMOL/L (ref 23–29)
CREAT SERPL-MCNC: 0.8 MG/DL (ref 0.5–1.4)
ERYTHROCYTE [DISTWIDTH] IN BLOOD BY AUTOMATED COUNT: 14.3 % (ref 11.5–14.5)
GFR SERPLBLD CREATININE-BSD FMLA CKD-EPI: >60 ML/MIN/1.73/M2
GLUCOSE SERPL-MCNC: 188 MG/DL (ref 70–110)
HCT VFR BLD AUTO: 29.8 % (ref 37–48.5)
HGB BLD-MCNC: 9.1 GM/DL (ref 12–16)
IMM GRANULOCYTES # BLD AUTO: 0.04 K/UL (ref 0–0.04)
IMM GRANULOCYTES NFR BLD AUTO: 0.4 % (ref 0–0.5)
LYMPHOCYTES # BLD AUTO: 0.91 K/UL (ref 1–4.8)
MAGNESIUM SERPL-MCNC: 1.9 MG/DL (ref 1.6–2.6)
MCH RBC QN AUTO: 27.7 PG (ref 27–31)
MCHC RBC AUTO-ENTMCNC: 30.5 G/DL (ref 32–36)
MCV RBC AUTO: 91 FL (ref 82–98)
NUCLEATED RBC (/100WBC) (OHS): 0 /100 WBC
OHS QRS DURATION: 74 MS
OHS QRS DURATION: 74 MS
OHS QRS DURATION: 78 MS
OHS QTC CALCULATION: 441 MS
OHS QTC CALCULATION: 485 MS
OHS QTC CALCULATION: 497 MS
PLATELET # BLD AUTO: 221 K/UL (ref 150–450)
PMV BLD AUTO: 9.8 FL (ref 9.2–12.9)
POTASSIUM SERPL-SCNC: 3.9 MMOL/L (ref 3.5–5.1)
PROT SERPL-MCNC: 6.3 GM/DL (ref 6–8.4)
RBC # BLD AUTO: 3.28 M/UL (ref 4–5.4)
RELATIVE EOSINOPHIL (OHS): 0 %
RELATIVE LYMPHOCYTE (OHS): 9.4 % (ref 18–48)
RELATIVE MONOCYTE (OHS): 5.7 % (ref 4–15)
RELATIVE NEUTROPHIL (OHS): 84.4 % (ref 38–73)
SODIUM SERPL-SCNC: 143 MMOL/L (ref 136–145)
TROPONIN I SERPL DL<=0.01 NG/ML-MCNC: 0.04 NG/ML
URATE SERPL-MCNC: 4.5 MG/DL (ref 2.4–5.7)
WBC # BLD AUTO: 9.69 K/UL (ref 3.9–12.7)

## 2025-04-26 PROCEDURE — 99223 1ST HOSP IP/OBS HIGH 75: CPT | Mod: ,,, | Performed by: INTERNAL MEDICINE

## 2025-04-26 PROCEDURE — 80053 COMPREHEN METABOLIC PANEL: CPT | Performed by: NURSE PRACTITIONER

## 2025-04-26 PROCEDURE — 25000003 PHARM REV CODE 250: Performed by: INTERNAL MEDICINE

## 2025-04-26 PROCEDURE — 63600175 PHARM REV CODE 636 W HCPCS: Performed by: NURSE PRACTITIONER

## 2025-04-26 PROCEDURE — 93005 ELECTROCARDIOGRAM TRACING: CPT

## 2025-04-26 PROCEDURE — 97530 THERAPEUTIC ACTIVITIES: CPT

## 2025-04-26 PROCEDURE — 63600175 PHARM REV CODE 636 W HCPCS: Performed by: INTERNAL MEDICINE

## 2025-04-26 PROCEDURE — 84484 ASSAY OF TROPONIN QUANT: CPT | Performed by: NURSE PRACTITIONER

## 2025-04-26 PROCEDURE — 97166 OT EVAL MOD COMPLEX 45 MIN: CPT

## 2025-04-26 PROCEDURE — 85025 COMPLETE CBC W/AUTO DIFF WBC: CPT | Performed by: NURSE PRACTITIONER

## 2025-04-26 PROCEDURE — 21400001 HC TELEMETRY ROOM

## 2025-04-26 PROCEDURE — 84550 ASSAY OF BLOOD/URIC ACID: CPT | Performed by: NURSE PRACTITIONER

## 2025-04-26 PROCEDURE — 99223 1ST HOSP IP/OBS HIGH 75: CPT | Mod: ,,, | Performed by: ORTHOPAEDIC SURGERY

## 2025-04-26 PROCEDURE — 93010 ELECTROCARDIOGRAM REPORT: CPT | Mod: ,,, | Performed by: INTERNAL MEDICINE

## 2025-04-26 PROCEDURE — 83735 ASSAY OF MAGNESIUM: CPT | Performed by: NURSE PRACTITIONER

## 2025-04-26 PROCEDURE — 36415 COLL VENOUS BLD VENIPUNCTURE: CPT | Performed by: NURSE PRACTITIONER

## 2025-04-26 PROCEDURE — 97162 PT EVAL MOD COMPLEX 30 MIN: CPT

## 2025-04-26 PROCEDURE — 25000003 PHARM REV CODE 250: Performed by: NURSE PRACTITIONER

## 2025-04-26 RX ADMIN — HYDRALAZINE HYDROCHLORIDE 10 MG: 20 INJECTION, SOLUTION INTRAMUSCULAR; INTRAVENOUS at 12:04

## 2025-04-26 RX ADMIN — DONEPEZIL HYDROCHLORIDE 10 MG: 5 TABLET, FILM COATED ORAL at 09:04

## 2025-04-26 RX ADMIN — PIPERACILLIN SODIUM AND TAZOBACTAM SODIUM 4.5 G: 4; .5 INJECTION, POWDER, FOR SOLUTION INTRAVENOUS at 09:04

## 2025-04-26 RX ADMIN — SODIUM CHLORIDE: 9 INJECTION, SOLUTION INTRAVENOUS at 05:04

## 2025-04-26 RX ADMIN — OLANZAPINE 5 MG: 5 TABLET, FILM COATED ORAL at 09:04

## 2025-04-26 RX ADMIN — PIPERACILLIN SODIUM AND TAZOBACTAM SODIUM 4.5 G: 4; .5 INJECTION, POWDER, FOR SOLUTION INTRAVENOUS at 01:04

## 2025-04-26 RX ADMIN — PIPERACILLIN SODIUM AND TAZOBACTAM SODIUM 4.5 G: 4; .5 INJECTION, POWDER, FOR SOLUTION INTRAVENOUS at 05:04

## 2025-04-26 RX ADMIN — FLUOXETINE HYDROCHLORIDE 10 MG: 10 CAPSULE ORAL at 09:04

## 2025-04-26 RX ADMIN — VANCOMYCIN HYDROCHLORIDE 750 MG: 750 INJECTION, POWDER, LYOPHILIZED, FOR SOLUTION INTRAVENOUS at 09:04

## 2025-04-26 NOTE — ASSESSMENT & PLAN NOTE
Treating with Zosyn and Vancomycin (sed rate >120, , lactic acid/procalcitonin normal)  Also given a dose of colchicine per ED, uric acid 5.7 today  Will give a dose of prednisone also, defer to day team for further dosing  Gentle hydration with IV fluids  Consult orthopedic surgery to evaluate

## 2025-04-26 NOTE — H&P
"  [facility] Medicine  History & Physical    Patient Name: Leslie Wood  MRN: [id number]  Patient Class: IP- Inpatient  Admission Date: 4/25/2025  Attending Physician: Roasna Nino MD   Primary Care Provider: Natalia Gomez MD         Patient information was obtained from patient, past medical records, and ER records.     Subjective:     Principal Problem:Cellulitis of left upper extremity    Chief Complaint:   Chief Complaint   Patient presents with    Cellulitis     Pt states she has cellulitis of her wrist.         HPI: Patient is 84-year-old female with past medical history significant for dementia, anxiety, depression, hypertension, nonischemic cardiomyopathy, CKD stage IIIA, stroke with left hemiparesis, family reports previous episodes of cellulitis, presented to ED from [facility] for evaluation of left hand/wrist erythema/swelling. She has severe dementia and can answer some yes/no questions but is confused at baseline. No family present during my exam, so information obtained via electronic medical records/ED notes. She currently has no complaints, answers "no" to every question I asked her. on arrival to ED, temp 100.8°, heart rate 82, respirations 20, blood pressure 210/82, 98% SpO2 on room air.   Lab workup showed WBC 14.58, hemoglobin 11.0, hematocrit 35.2, platelets 308, sed rate greater than 120, , uric acid 5.7, potassium 3.4, BUN 18, creatinine 0.9, glucose 192, magnesium 2.0, alk-phos 123, albumin 2.1, total bilirubin 0.4, , , , troponin 0.040, lactic acid 1.6, procalcitonin 0.22, urinalysis with positive nitrite, 3+ leukocyte esterase, greater than 100 WBC, many bacteria.  Blood cultures x2 are pending.  Urine culture is pending.  Chest x-ray clear.  X-ray of left hand and left wrist completed with no evident fractures, does show diffuse soft tissue swelling and osteopenia/osteoarthritis with degenerative changes.  EKG " shows normal sinus rhythm, heart rate 76, no acute ST or T-wave abnormalities noted.  She answers no when asked if having chest pain or shortness on breath.  Appears comfortable during exam.  While in ED she was given acetaminophen 1000 mg, normal saline 1 L fluid bolus, IV Zosyn, IV labetalol 10 mg, DuoNeb treatment, and colchicine 1.2 mg p.o..  Blood pressure has improved and most recent is 117/58.  Heart rate around 60.  Temperature improved to 97.6.  Hospital Medicine was consulted for admission due to cellulitis and urinary tract infection.    Past Medical History:   Diagnosis Date    Alzheimer's disease, unspecified (CODE)     Arthritis     Cataract     Dementia without behavioral disturbance     GERD (gastroesophageal reflux disease)     Heart attack 05/23/2022    Hypertension     Stroke        Past Surgical History:   Procedure Laterality Date    ADENOIDECTOMY      ADRENAL GLAND SURGERY      APPENDECTOMY      BACK SURGERY      fusion l 4-5 s 1,2,3  fusion l 2-3    CORONARY ANGIOGRAPHY N/A 05/20/2022    Procedure: ANGIOGRAM, CORONARY ARTERY;  Surgeon: Domingo Martins MD;  Location: [facility] CATH LAB;  Service: Cardiology;  Laterality: N/A;    CORONARY ANGIOGRAPHY N/A 05/24/2022    Procedure: ANGIOGRAM, CORONARY ARTERY;  Surgeon: Doimngo Martins MD;  Location: [facility] CATH LAB;  Service: Cardiology;  Laterality: N/A;    EYE SURGERY      HEMORRHOID SURGERY      HERNIA REPAIR      HYSTERECTOMY      partial    indirect lumbar decompression      percutaneous placement of interspinous extension blocker    LEFT HEART CATHETERIZATION Left 05/20/2022    Procedure: CATHETERIZATION, HEART, LEFT;  Surgeon: Domingo Martins MD;  Location: [facility] CATH LAB;  Service: Cardiology;  Laterality: Left;    TONSILLECTOMY         Review of patient's allergies indicates:   Allergen Reactions    Amitriptyline     Hydrochlorothiazide      Causes muscle cramping    Lisinopril      hyperkalemia    Opioids - morphine analogues Hallucinations      Chest pain and hallucinations    Oxycodone     Percocet [oxycodone-acetaminophen] Other (See Comments)     Seizures    Belbuca [buprenorphine hcl] Nausea And Vomiting and Other (See Comments)     Black out     Codeine Nausea Only and Rash    Prazosin Other (See Comments)     dizziness       No current facility-administered medications on file prior to encounter.     Current Outpatient Medications on File Prior to Encounter   Medication Sig    aspirin 81 MG Chew Take 81 mg by mouth once daily.    atorvastatin (LIPITOR) 10 MG tablet Take 1 tablet (10 mg total) by mouth every evening.    donepeziL (ARICEPT) 10 MG tablet TAKE 1 TABLET BY MOUTH EVERY EVENING    FLUoxetine 10 MG capsule Take 10 mg by mouth once daily.    FLUoxetine 20 MG capsule TAKE ONE CAPSULE BY MOUTH ONCE DAILY    furosemide (LASIX) 20 MG tablet Take 1 tablet (20 mg total) by mouth once daily.    losartan (COZAAR) 50 MG tablet Take 1 tablet (50 mg total) by mouth 2 (two) times daily.    metoprolol succinate (TOPROL-XL) 25 MG 24 hr tablet Take 1 tablet (25 mg total) by mouth once daily.    OLANZapine (ZYPREXA) 5 MG tablet Take 1 tablet (5 mg total) by mouth every evening.    b complex vitamins capsule Take 1 capsule by mouth once daily.    cyanocobalamin (VITAMIN B-12) 1000 MCG tablet Take 1 tablet (1,000 mcg total) by mouth once daily.    memantine (NAMENDA) 10 MG Tab TAKE 1 TABLET BY MOUTH TWICE DAILY (Patient not taking: Reported on 4/25/2025)    multivitamin Tab Take 1 tablet by mouth once daily.    [DISCONTINUED] carvediloL (COREG) 6.25 MG tablet Take 1 tablet (6.25 mg total) by mouth 2 (two) times daily. TAKE (1) TABLET BY MOUTH 2 TIMES A DAY WITH MEALS    [DISCONTINUED] cloNIDine (CATAPRES) 0.1 MG tablet Take 1 tablet (0.1 mg total) by mouth 2 (two) times daily. To take an extra dose if BP >160/90    [DISCONTINUED] diclofenac sodium (VOLTAREN) 1 % Gel Apply 2 g topically 3 (three) times daily.    [DISCONTINUED] isosorbide mononitrate (IMDUR)  30 MG 24 hr tablet TAKE 1 TABLET BY MOUTH TWICE A DAY    [DISCONTINUED] nitroGLYCERIN (NITROSTAT) 0.4 MG SL tablet Place 1 tablet (0.4 mg total) under the tongue every 5 (five) minutes as needed for Chest pain.     Family History       Problem Relation (Age of Onset)    Breast cancer Sister    Diabetes Mother    Heart disease Mother    Hypertension Mother, Father    Kidney disease Father          Tobacco Use    Smoking status: Never    Smokeless tobacco: Never   Substance and Sexual Activity    Alcohol use: No    Drug use: No    Sexual activity: Not Currently     Review of Systems   Reason unable to perform ROS: dementia.   Constitutional:  Positive for fever.   Musculoskeletal:  Positive for joint swelling.        Left hand erythema, swelling     Objective:     Vital Signs (Most Recent):  Temp: 97.6 °F (36.4 °C) (04/25/25 1947)  Pulse: 64 (04/25/25 1957)  Resp: 20 (04/25/25 1947)  BP: (!) 119/53 (04/25/25 1947)  SpO2: (!) 94 % (04/25/25 1947) Vital Signs (24h Range):  Temp:  [97.6 °F (36.4 °C)-100.8 °F (38.2 °C)] 97.6 °F (36.4 °C)  Pulse:  [59-82] 64  Resp:  [16-29] 20  SpO2:  [93 %-98 %] 94 %  BP: (117-224)/(53-93) 119/53        Body mass index is 24.33 kg/m².     Physical Exam  Vitals reviewed.   Constitutional:       General: She is not in acute distress.     Appearance: She is ill-appearing. She is not diaphoretic.      Comments: Elderly female, confused (secondary to severe dementia), calm and cooperative at this time, appears comfortable   HENT:      Head: Normocephalic and atraumatic.      Nose: Nose normal.      Mouth/Throat:      Mouth: Mucous membranes are moist.      Pharynx: Oropharynx is clear.   Eyes:      Extraocular Movements: Extraocular movements intact.      Pupils: Pupils are equal, round, and reactive to light.   Cardiovascular:      Rate and Rhythm: Normal rate and regular rhythm.      Heart sounds: Normal heart sounds.   Pulmonary:      Effort: Pulmonary effort is normal. No respiratory  distress.      Breath sounds: Normal breath sounds. No stridor. No wheezing, rhonchi or rales.   Chest:      Chest wall: No tenderness.   Abdominal:      General: Bowel sounds are normal. There is no distension.      Palpations: Abdomen is soft.      Tenderness: There is no abdominal tenderness. There is no right CVA tenderness, left CVA tenderness, guarding or rebound.   Musculoskeletal:         General: Swelling and tenderness present. No signs of injury.      Cervical back: Neck supple.      Comments: Left hand/fingers -- +edema, knuckles swollen, +erythema, TTP, decreased ROM    Skin:     General: Skin is warm and dry.      Capillary Refill: Capillary refill takes less than 2 seconds.      Findings: Erythema present.   Neurological:      Mental Status: She is alert. Mental status is at baseline. She is disoriented.      Comments: Left sided weakness (baseline from previous stroke)   Psychiatric:         Mood and Affect: Mood normal.         Behavior: Behavior normal.              CRANIAL NERVES     CN III, IV, VI   Pupils are equal, round, and reactive to light.       Significant Labs: All pertinent labs within the past 24 hours have been reviewed.  Recent Lab Results         04/25/25  1704   04/25/25  1651        Procalcitonin   0.22  Comment: A concentration < 0.25 ng/mL represents a low risk of bacterial infection.  Procalcitonin may not be accurate among patients with localized   infection, recent trauma or major surgery, immunosuppressed state,   invasive fungal infection, renal dysfunction. Decisions regarding   initiation or continuation of antibiotic therapy should not be based   solely on procalcitonin levels.       Albumin   2.1       ALP   123       ALT   104       Anion Gap   9       Appearance, UA Hazy         AST   158       Bacteria, UA Many         Baso #   0.02       Basophil %   0.1       Bilirubin (UA) Negative         BILIRUBIN TOTAL   0.4  Comment: For infants and newborns, interpretation of  results should be based   on gestational age, weight and in agreement with clinical   observations.    Premature Infant recommended reference ranges:   0-24 hours:  <8.0 mg/dL   24-48 hours: <12.0 mg/dL   3-5 days:    <15.0 mg/dL   6-29 days:   <15.0 mg/dL       BNP   450  Comment: Values of less than 100 pg/ml are consistent with non-CHF populations.        BUN   18       Calcium   8.9       Chloride   105       CO2   29       Color, UA Yellow         Creatinine   0.9       CRP   225.2       eGFR   >60  Comment: Estimated GFR calculated using the CKD-EPI creatinine (2021) equation.       Eos #   0.00       Eos %   0.0       Glucose   192       Glucose, UA Negative         Gran # (ANC)   12.20       Hematocrit   35.2       Hemoglobin   11.0       Extra Tube Hold for add-ons.  Comment: Auto resulted.      Hold for add-ons.  Comment: Auto resulted.             Hold for add-ons.  Comment: Auto resulted.             Hold for add-ons.  Comment: Auto resulted.          Hyaline Casts, UA 0         Immature Grans (Abs)   0.06  Comment: Mild elevation in immature granulocytes is non specific and can be seen in a variety of conditions including stress response, acute inflammation, trauma and pregnancy. Correlation with other laboratory and clinical findings is essential.       Immature Granulocytes   0.4       Ketones, UA Negative         Lactic Acid Level   1.6       Leukocyte Esterase, UA 3+         Lymph #   1.10       Lymph %   7.5       Magnesium    2.0       MCH   28.4       MCHC   31.3       MCV   91       Microscopic Comment   Comment: Other formed elements not mentioned in the report are not present in the microscopic examination.         Mono #   1.20       Mono %   8.2       MPV   9.8       Neut %   83.8       NITRITE UA Positive         nRBC   0       Blood, UA Trace         pH, UA 6.0         Platelet Count   308       Potassium   3.4       PROTEIN TOTAL   8.2       Protein, UA 2+  Comment: Recommend a 24 hour  urine protein or a urine protein/creatinine ratio if globulin induced proteinuria is clinically suspected.         RBC   3.88       RBC, UA 5         RDW   14.6       Sed Rate   >120       Sodium   143       Spec Grav UA 1.025         Squam Epithel, UA 3         Troponin I   0.040  Comment: The reference interval for Troponin I represents the 99th percentile cutoff for our facility and is consistent with 3rd generation assay performance.       Uric Acid   5.7       Urobilinogen, UA 2.0-3.0         WBC, UA >100         WBC Clumps, UA Few         WBC   14.58             EKG reviewed -- SR, HR 76, no acute ST or T wave abnormalities    Significant Imaging: I have reviewed all pertinent imaging results/findings within the past 24 hours.    US abdomen -- pending  CXR -- lungs appear clear  X-Ray left hand/wrist -- no fractures, osteoarthritis, degenerative changes, diffuse soft tissue swelling    Assessment/Plan:     Assessment & Plan  Cellulitis of left upper extremity  Treating with Zosyn and Vancomycin (sed rate >120, , lactic acid/procalcitonin normal)  Also given a dose of colchicine per ED, uric acid 5.7 today  Will give a dose of prednisone also, defer to day team for further dosing  Gentle hydration with IV fluids  Consult orthopedic surgery to evaluate      Acute cystitis with hematuria  Follow up urine culture reports  Currently on Vancomycin and Zosyn    Anxiety and depression  Continue home medication--fluoxetine      Hemiplegia and hemiparesis following cerebral infarction affecting left non-dominant side  Supportive care  Fall precautions  PT/OT    Nonischemic cardiomyopathy  Monitor fluid volume status closely  Per records -- history of Takotsubo cardiomyopathy (apical ballooning syndrome)  No echocardiogram results found for the past 12 months  Echo from 2022  The left ventricle is normal in size with concentric remodeling and normal systolic function.  The estimated ejection fraction is  60%.  Normal left ventricular diastolic function.  Normal right ventricular size with normal right ventricular systolic function.  Mild aortic regurgitation.  There is moderate pulmonary hypertension.  Normal central venous pressure (3 mmHg).  The estimated PA systolic pressure is 52 mmHg.       Moderate vascular dementia with anxiety  Requires nursing home care   Continue nightly olanzapine and daily donepezil  Stage 3a chronic kidney disease  Creatine stable for now. BMP reviewed- noted Estimated Creatinine Clearance: 36.8 mL/min (based on SCr of 0.9 mg/dL). according to latest data. Based on current GFR, CKD stage is stage 3 - GFR 30-59.  Monitor UOP and serial BMP and adjust therapy as needed. Renally dose meds. Avoid nephrotoxic medications and procedures.  -- currently hydrating with IV fluids, monitor I&O/labs  Essential hypertension  Patient's blood pressure range in the last 24 hours was: BP  Min: 117/58  Max: 224/93.The patient's inpatient anti-hypertensive regimen is listed below:  Current Antihypertensives  metoprolol succinate (TOPROL-XL) 24 hr tablet 25 mg, Daily, Oral  hydrALAZINE injection 10 mg, Every 6 hours PRN, Intravenous    Plan  - BP was elevated and treated with IV labetalol per ED  - Will add PRN hydralazine  Hypokalemia  Patient's most recent potassium results are listed below.   Recent Labs     04/25/25  1651   K 3.4*     Plan  - Replete potassium per protocol  - Monitor potassium Daily    Elevated LFTs  US in progress  Monitor labs  Avoid hepatotoxins  No tenderness on exam    VTE Risk Mitigation (From admission, onward)           Ordered     Reason for No Pharmacological VTE Prophylaxis  Once        Comments: May require procedure   Question:  Reasons:  Answer:  Physician Provided (leave comment)    04/25/25 1951     IP VTE HIGH RISK PATIENT  Once         04/25/25 1951     Place sequential compression device  Until discontinued         04/25/25 1951                    This patient's case  has been reviewed by my supervising physician, Dr. Rosana Nino.  Supervising physician will provide additional orders and recommendations at his or her discretion.         Myranda Gray NP  Department of Hospital Medicine  [facility] ([facility])

## 2025-04-26 NOTE — SUBJECTIVE & OBJECTIVE
Interval History: pt in bed upon exam and cooperative with L-sided weakness noted. Bradycardia on monitor. Cardiology consulted. PT/OT evaluation placed. IV antibiotics continued. Pt is NH resident with daughter at bedside.     Review of Systems   Unable to perform ROS: Dementia     Objective:     Vital Signs (Most Recent):  Temp: 98.2 °F (36.8 °C) (04/26/25 0756)  Pulse: (!) 46 (04/26/25 0916)  Resp: 18 (04/26/25 0756)  BP: (!) 143/71 (04/26/25 0756)  SpO2: 97 % (04/26/25 0756) Vital Signs (24h Range):  Temp:  [97 °F (36.1 °C)-100.8 °F (38.2 °C)] 98.2 °F (36.8 °C)  Pulse:  [37-82] 46  Resp:  [16-29] 18  SpO2:  [93 %-98 %] 97 %  BP: (117-224)/(53-93) 143/71     Weight: 58.2 kg (128 lb 4.9 oz)  Body mass index is 24.24 kg/m².    Intake/Output Summary (Last 24 hours) at 4/26/2025 1140  Last data filed at 4/26/2025 0643  Gross per 24 hour   Intake 656.5 ml   Output --   Net 656.5 ml         Physical Exam  HENT:      Nose: Nose normal.      Mouth/Throat:      Mouth: Mucous membranes are moist.      Pharynx: Oropharynx is clear.   Cardiovascular:      Rate and Rhythm: Regular rhythm. Bradycardia present.      Pulses: Normal pulses.   Pulmonary:      Effort: Pulmonary effort is normal.      Breath sounds: Normal breath sounds.      Comments: Decreased bibasilar  Abdominal:      General: Bowel sounds are normal.      Palpations: Abdomen is soft.   Musculoskeletal:      Left wrist: Swelling present. Decreased range of motion (mild).      Left hand: Swelling and tenderness (mild) present.      Comments: L sided weakness (CVA residual)    Skin:     General: Skin is warm.      Findings: Bruising (L wrist) and erythema (L wrist/hand) present.      Comments: Sacral wound (healing) -bruise to L heel    Neurological:      Mental Status: She is alert. Mental status is at baseline.   Psychiatric:         Behavior: Behavior is cooperative.         Cognition and Memory: Cognition is impaired. Memory is impaired.                Significant Labs: All pertinent labs within the past 24 hours have been reviewed.    Significant Imaging: I have reviewed all pertinent imaging results/findings within the past 24 hours.

## 2025-04-26 NOTE — ASSESSMENT & PLAN NOTE
Patient's most recent potassium results are listed below.   Recent Labs     04/25/25  1651 04/26/25  0536   K 3.4* 3.9     Plan  - Replete potassium per protocol  - Monitor potassium Daily

## 2025-04-26 NOTE — ASSESSMENT & PLAN NOTE
Treating with Zosyn and Vancomycin (sed rate >120, , lactic acid/procalcitonin normal)  Also given a dose of colchicine per ED, uric acid 5.7 > 4.5  Prednisone 50 mg in ED  Gentle hydration with IV fluids  orthopedic surgery consulted with no surgical intervention at this time

## 2025-04-26 NOTE — ASSESSMENT & PLAN NOTE
Creatine stable for now. BMP reviewed- noted Estimated Creatinine Clearance: 41.4 mL/min (based on SCr of 0.8 mg/dL). according to latest data. Based on current GFR, CKD stage is stage 3 - GFR 30-59.  Monitor UOP and serial BMP and adjust therapy as needed. Renally dose meds. Avoid nephrotoxic medications and procedures.  -- currently hydrating with IV fluids, monitor I&O/labs

## 2025-04-26 NOTE — PT/OT/SLP EVAL
Physical Therapy Evaluation    Patient Name:  Leslie Wood   MRN:  [id number]    Recommendations:     Discharge Recommendations: Moderate Intensity Therapy   Discharge Equipment Recommendations: to be determined by next level of care   Barriers to discharge: None    Assessment:     Leslie Wood is a 84 y.o. female admitted with a medical diagnosis of Cellulitis of left upper extremity.  She presents with the following impairments/functional limitations: impaired endurance, weakness, impaired cognition, decreased coordination, gait instability, impaired functional mobility, decreased safety awareness, pain which limits mobility and increases risk of falls. Pt required total A x 2 for all mobility, resistance to manual cues for mobility more so related to confusion rather than decreased willingness. Recommend mod intensity.    Rehab Prognosis: Fair; patient would benefit from acute skilled PT services to address these deficits and reach maximum level of function.    Recent Surgery: Procedure(s) (LRB):  REMOVAL, FOREIGN BODY, UPPER EXTREMITY (Left)      Plan:     During this hospitalization, patient to be seen 3 x/week to address the identified rehab impairments via gait training, therapeutic activities, therapeutic exercises, neuromuscular re-education and progress toward the following goals:    Plan of Care Expires:  05/10/25    Subjective     Chief Complaint: pain to L knee  Patient/Family Comments/goals: to get better per daughter  Pain/Comfort:  Pain Rating 1: 0/10 (at start but reported pain to L knee during mobility ~3/10 via faces scale)  Pain Rating Post-Intervention 1: 0/10    Patients cultural, spiritual, Zoroastrianism conflicts given the current situation: no    Living Environment:  Pt lives at a NH x 6-7 weeks due to declining cognition  Prior to admission, patients level of function likely required increased assistance with all functional tasks due to cognition.  Equipment used at home: wheelchair.  DME  owned (not currently used): none.  Upon discharge, patient will have assistance from NH staff.    Objective:     Communicated with nursing (Steven) and performed chart review via epic system prior to session.  Patient found supine with peripheral IV, telemetry  upon PT entry to room. Daughter at bedside    General Precautions: Standard, fall  Orthopedic Precautions:N/A   Braces: N/A  Respiratory Status: Room air    Exams:  Cognitive Exam:  Patient is oriented to Person  Gross Motor Coordination:  impaired  Postural Exam:  Patient presented with the following abnormalities:    -       Rounded shoulders  -       Forward head and posterior lean  RLE ROM: impaired 25-50% PROM, limited actively secondary to confusion  RLE Strength: grossly deconditioned  LLE ROM: impaired 25-50% PROM, limited actively secondary to confusion  LLE Strength: grossly deconditioned    Functional Mobility:  Bed Mobility:     Scooting: total assistance and of 2 persons  Supine to Sit: total assistance and of 2 persons  Tolerated sitting EOB x 5-8 mins with CGA/SBA for balance  Sit to Supine: total assistance and of 2 persons  Transfers:     Sit to Stand:  total assistance and of 2 persons with rolling walker  Gait: unable  Balance: poor minus dynamic standing balance      AM-PAC 6 CLICK MOBILITY  Total Score:9       Treatment & Education:  Educated pt on benefits of consistent participation in PT services to meet functional goals. Educated pt on call don't fall policy and use of call button to alert nursing staff of needs (including to assist in/out of bed). Pt expressed understanding.     Patient left supine with all lines intact, call button in reach, bed alarm on, nursing notified, and daughter present.    GOALS:   Multidisciplinary Problems       Physical Therapy Goals          Problem: Physical Therapy    Goal Priority Disciplines Outcome Interventions   Physical Therapy Goal     PT, PT/OT     Description: Pt will perform bed mobility  with mod A in order to participate in EOB activity.  Pt will perform transfers with mod A in order to participate in OOB activity.  Pt will ambulate 50 ft mod A with LRAD in order to participate in daily tasks.   Pt will improve functional mobility as evidenced by 2 pt improvement in AMPAC score.                       DME Justifications:  TBD    History:     Past Medical History:   Diagnosis Date    Alzheimer's disease, unspecified (CODE)     Arthritis     Cataract     Dementia without behavioral disturbance     GERD (gastroesophageal reflux disease)     Heart attack 05/23/2022    Hypertension     Stroke        Past Surgical History:   Procedure Laterality Date    ADENOIDECTOMY      ADRENAL GLAND SURGERY      APPENDECTOMY      BACK SURGERY      fusion l 4-5 s 1,2,3  fusion l 2-3    CORONARY ANGIOGRAPHY N/A 05/20/2022    Procedure: ANGIOGRAM, CORONARY ARTERY;  Surgeon: Domingo Martins MD;  Location: [facility] CATH LAB;  Service: Cardiology;  Laterality: N/A;    CORONARY ANGIOGRAPHY N/A 05/24/2022    Procedure: ANGIOGRAM, CORONARY ARTERY;  Surgeon: Domingo Martins MD;  Location: [facility] CATH LAB;  Service: Cardiology;  Laterality: N/A;    EYE SURGERY      HEMORRHOID SURGERY      HERNIA REPAIR      HYSTERECTOMY      partial    indirect lumbar decompression      percutaneous placement of interspinous extension blocker    LEFT HEART CATHETERIZATION Left 05/20/2022    Procedure: CATHETERIZATION, HEART, LEFT;  Surgeon: Domingo Martins MD;  Location: [facility] CATH LAB;  Service: Cardiology;  Laterality: Left;    TONSILLECTOMY         Time Tracking:     PT Received On: 04/26/25  PT Start Time: 1335     PT Stop Time: 1355  PT Total Time (min): 20 min     Billable Minutes: Evaluation 10 and Therapeutic Activity 10      04/26/2025

## 2025-04-26 NOTE — HPI
"Patient is 84-year-old female with past medical history significant for dementia, anxiety, depression, hypertension, nonischemic cardiomyopathy, CKD stage IIIA, stroke with left hemiparesis, family reports previous episodes of cellulitis, presented to ED from [facility] for evaluation of left hand/wrist erythema/swelling. She has severe dementia and can answer some yes/no questions but is confused at baseline. No family present during my exam, so information obtained via electronic medical records/ED notes. She currently has no complaints, answers "no" to every question I asked her. on arrival to ED, temp 100.8°, heart rate 82, respirations 20, blood pressure 210/82, 98% SpO2 on room air.   Lab workup showed WBC 14.58, hemoglobin 11.0, hematocrit 35.2, platelets 308, sed rate greater than 120, , uric acid 5.7, potassium 3.4, BUN 18, creatinine 0.9, glucose 192, magnesium 2.0, alk-phos 123, albumin 2.1, total bilirubin 0.4, , , , troponin 0.040, lactic acid 1.6, procalcitonin 0.22, urinalysis with positive nitrite, 3+ leukocyte esterase, greater than 100 WBC, many bacteria.  Blood cultures x2 are pending.  Urine culture is pending.  Chest x-ray clear.  X-ray of left hand and left wrist completed with no evident fractures, does show diffuse soft tissue swelling and osteopenia/osteoarthritis with degenerative changes.  EKG shows normal sinus rhythm, heart rate 76, no acute ST or T-wave abnormalities noted.  She answers no when asked if having chest pain or shortness on breath.  Appears comfortable during exam.  While in ED she was given acetaminophen 1000 mg, normal saline 1 L fluid bolus, IV Zosyn, IV labetalol 10 mg, DuoNeb treatment, and colchicine 1.2 mg p.o..  Blood pressure has improved and most recent is 117/58.  Heart rate around 60.  Temperature improved to 97.6.  Hospital Medicine was consulted for admission due to cellulitis and urinary tract infection.  "

## 2025-04-26 NOTE — PLAN OF CARE
EVAL AND TX COMPLETED: facilitated bed mobility with total A x 2, transfers with total A x 2. Unable to ambulate Recommend mod intensity.

## 2025-04-26 NOTE — ASSESSMENT & PLAN NOTE
US of abdomen showed no acute findings -lipoma present  Monitor labs- AST/>76/104>68  Avoid hepatotoxins  No tenderness on exam

## 2025-04-26 NOTE — ASSESSMENT & PLAN NOTE
Patient's blood pressure range in the last 24 hours was: BP  Min: 117/58  Max: 224/93.The patient's inpatient anti-hypertensive regimen is listed below:  Current Antihypertensives  hydrALAZINE injection 10 mg, Every 6 hours PRN, Intravenous    Plan  - BP was elevated and treated with IV labetalol per ED- Bradycardia on monitor   - Will add PRN hydralazine

## 2025-04-26 NOTE — HOSPITAL COURSE
Pt admitted to Hospital Medicine for medical management of suspected cellulitis of left hand/wrist in the setting of UTI, Hypertensive Urgency, and dementia. ESR/CRP elevated. Uric acid normal. Imaging supports moderate severe osteoarthritis at the first carpal metacarpal joint.  Mild osteoarthritis radiocarpal joint.  Osteopenia and diffuse soft tissue swelling.  CXRY and US abdomen showed no acute findings. IV antibiotics and IVF initiated. Orthopedic Surgery consulted and evaluation completed with no surgical intervention required at this time. Bradycardia (HR 40's) noted to monitor - Cardiology consulted. Of note, pt received Labetalol in ED for BP control. EKG obtained. On 4/27/2025, K replaced, AST normalized, and ALT trending down. Bradycardia resolved. Cardiology following with Lasix x 1 dose given. R hand improved with full ROM demonstrated and no pain reported. Orthopedic Surgery to sign off.  IV antibiotics transitioned to Clindamycin and Rocephin.     4/28/25  NAEON, patient resting in bed, stable on 2L NC  Continue ceftriaxone for LUE cellulitis  Continue IV lasix for CHF exacerbation  K 3.3, replete and monitor    4/29/25  NAEON, patient sitting in bed, awake, alert, pleasently confused  Urine culture +klebsiella, pan-sensitive  LUE cellulitis improved along with swelling  Transitioned to PO antibiotics for cellulitis and UTI treatment  Stable for discharge to SNF  F/u with PCP in 2-3 weeks for further management

## 2025-04-26 NOTE — ASSESSMENT & PLAN NOTE
Recovered.    She appears to be euvolemic on exam despite minimal lower extremity swelling which could be dependent.    Monitor intake and output.    Use Lasix IV 20 mg p.r.n. as needed.    Chest x-ray reviewed no signs of fluid overload.    Oxygenation is good

## 2025-04-26 NOTE — SUBJECTIVE & OBJECTIVE
Past Medical History:   Diagnosis Date    Alzheimer's disease, unspecified (CODE)     Arthritis     Cataract     Dementia without behavioral disturbance     GERD (gastroesophageal reflux disease)     Heart attack 05/23/2022    Hypertension     Stroke        Past Surgical History:   Procedure Laterality Date    ADENOIDECTOMY      ADRENAL GLAND SURGERY      APPENDECTOMY      BACK SURGERY      fusion l 4-5 s 1,2,3  fusion l 2-3    CORONARY ANGIOGRAPHY N/A 05/20/2022    Procedure: ANGIOGRAM, CORONARY ARTERY;  Surgeon: Domingo Martins MD;  Location: [facility] CATH LAB;  Service: Cardiology;  Laterality: N/A;    CORONARY ANGIOGRAPHY N/A 05/24/2022    Procedure: ANGIOGRAM, CORONARY ARTERY;  Surgeon: Domingo Martins MD;  Location: [facility] CATH LAB;  Service: Cardiology;  Laterality: N/A;    EYE SURGERY      HEMORRHOID SURGERY      HERNIA REPAIR      HYSTERECTOMY      partial    indirect lumbar decompression      percutaneous placement of interspinous extension blocker    LEFT HEART CATHETERIZATION Left 05/20/2022    Procedure: CATHETERIZATION, HEART, LEFT;  Surgeon: Domingo Martins MD;  Location: [facility] CATH LAB;  Service: Cardiology;  Laterality: Left;    TONSILLECTOMY         Review of patient's allergies indicates:   Allergen Reactions    Amitriptyline     Hydrochlorothiazide      Causes muscle cramping    Lisinopril      hyperkalemia    Opioids - morphine analogues Hallucinations     Chest pain and hallucinations    Oxycodone     Percocet [oxycodone-acetaminophen] Other (See Comments)     Seizures    Belbuca [buprenorphine hcl] Nausea And Vomiting and Other (See Comments)     Black out     Codeine Nausea Only and Rash    Prazosin Other (See Comments)     dizziness       No current facility-administered medications on file prior to encounter.     Current Outpatient Medications on File Prior to Encounter   Medication Sig    aspirin 81 MG Chew Take 81 mg by mouth once daily.    atorvastatin (LIPITOR) 10 MG tablet Take 1 tablet  (10 mg total) by mouth every evening.    donepeziL (ARICEPT) 10 MG tablet TAKE 1 TABLET BY MOUTH EVERY EVENING    FLUoxetine 10 MG capsule Take 10 mg by mouth once daily.    FLUoxetine 20 MG capsule TAKE ONE CAPSULE BY MOUTH ONCE DAILY    furosemide (LASIX) 20 MG tablet Take 1 tablet (20 mg total) by mouth once daily.    losartan (COZAAR) 50 MG tablet Take 1 tablet (50 mg total) by mouth 2 (two) times daily.    metoprolol succinate (TOPROL-XL) 25 MG 24 hr tablet Take 1 tablet (25 mg total) by mouth once daily.    OLANZapine (ZYPREXA) 5 MG tablet Take 1 tablet (5 mg total) by mouth every evening.    b complex vitamins capsule Take 1 capsule by mouth once daily.    cyanocobalamin (VITAMIN B-12) 1000 MCG tablet Take 1 tablet (1,000 mcg total) by mouth once daily.    memantine (NAMENDA) 10 MG Tab TAKE 1 TABLET BY MOUTH TWICE DAILY (Patient not taking: Reported on 4/25/2025)    multivitamin Tab Take 1 tablet by mouth once daily.    [DISCONTINUED] carvediloL (COREG) 6.25 MG tablet Take 1 tablet (6.25 mg total) by mouth 2 (two) times daily. TAKE (1) TABLET BY MOUTH 2 TIMES A DAY WITH MEALS    [DISCONTINUED] cloNIDine (CATAPRES) 0.1 MG tablet Take 1 tablet (0.1 mg total) by mouth 2 (two) times daily. To take an extra dose if BP >160/90    [DISCONTINUED] diclofenac sodium (VOLTAREN) 1 % Gel Apply 2 g topically 3 (three) times daily.    [DISCONTINUED] isosorbide mononitrate (IMDUR) 30 MG 24 hr tablet TAKE 1 TABLET BY MOUTH TWICE A DAY    [DISCONTINUED] nitroGLYCERIN (NITROSTAT) 0.4 MG SL tablet Place 1 tablet (0.4 mg total) under the tongue every 5 (five) minutes as needed for Chest pain.     Family History       Problem Relation (Age of Onset)    Breast cancer Sister    Diabetes Mother    Heart disease Mother    Hypertension Mother, Father    Kidney disease Father          Tobacco Use    Smoking status: Never    Smokeless tobacco: Never   Substance and Sexual Activity    Alcohol use: No    Drug use: No    Sexual activity:  "Not Currently     Review of Systems   Cardiovascular:  Positive for leg swelling. Negative for chest pain, dyspnea on exertion, palpitations and syncope.   Neurological:  Negative for focal weakness.     Objective:     Vital Signs (Most Recent):  Temp: 98.3 °F (36.8 °C) (04/26/25 1156)  Pulse: 70 (04/26/25 1337)  Resp: 18 (04/26/25 1156)  BP: (!) 123/59 (04/26/25 1337)  SpO2: 97 % (04/26/25 1156) Vital Signs (24h Range):  Temp:  [97 °F (36.1 °C)-100.8 °F (38.2 °C)] 98.3 °F (36.8 °C)  Pulse:  [37-82] 70  Resp:  [16-29] 18  SpO2:  [93 %-98 %] 97 %  BP: (117-224)/(53-93) 123/59     Weight: 58.2 kg (128 lb 4.9 oz)  Body mass index is 24.24 kg/m².    SpO2: 97 %         Intake/Output Summary (Last 24 hours) at 4/26/2025 1340  Last data filed at 4/26/2025 0643  Gross per 24 hour   Intake 656.5 ml   Output --   Net 656.5 ml       Lines/Drains/Airways       Peripheral Intravenous Line  Duration                  Peripheral IV - Single Lumen 04/25/25 1723 20 G Anterior;Right Forearm <1 day                     Physical Exam  Vitals reviewed.   Constitutional:       Appearance: She is well-developed.   Neck:      Vascular: No carotid bruit.   Cardiovascular:      Rate and Rhythm: Normal rate and regular rhythm.      Pulses: Intact distal pulses.      Heart sounds: Normal heart sounds. No murmur heard.  Pulmonary:      Breath sounds: No rales.          Significant Labs: Troponin No results for input(s): "TROPONINIHS" in the last 48 hours., All pertinent lab results from the last 24 hours have been reviewed., and   Recent Lab Results  (Last 5 results in the past 24 hours)        04/26/25  0536   04/26/25  0149   04/25/25 2025 04/25/25  1704   04/25/25  1702        Albumin 1.6               ALP 92               ALT 68               Anion Gap 7               Appearance, UA       Hazy         AST 76               Bacteria, UA       Many         Baso # 0.01               Basophil % 0.1               Bilirubin (UA)       Negative    "      BILIRUBIN TOTAL 0.4  Comment: For infants and newborns, interpretation of results should be based   on gestational age, weight and in agreement with clinical   observations.    Premature Infant recommended reference ranges:   0-24 hours:  <8.0 mg/dL   24-48 hours: <12.0 mg/dL   3-5 days:    <15.0 mg/dL   6-29 days:   <15.0 mg/dL               BLOOD CULTURE         No Growth After 6 Hours  [P]       BUN 21               Calcium 8.3               Chloride 110               CO2 26               Color, UA       Yellow         CPK     18  Comment: STAT, if not done in ED, then at 2nd and 6th hour           Creatinine 0.8               eGFR >60  Comment: Estimated GFR calculated using the CKD-EPI creatinine (2021) equation.               Eos # 0.00               Eos % 0.0               Glucose 188               Glucose, UA       Negative         Gran # (ANC) 8.18               Hematocrit 29.8               Hemoglobin 9.1               Extra Tube       Hold for add-ons.  Comment: Auto resulted.            Hyaline Casts, UA       0         Immature Grans (Abs) 0.04  Comment: Mild elevation in immature granulocytes is non specific and can be seen in a variety of conditions including stress response, acute inflammation, trauma and pregnancy. Correlation with other laboratory and clinical findings is essential.               Immature Granulocytes 0.4               Ketones, UA       Negative         Leukocyte Esterase, UA       3+         Lymph # 0.91               Lymph % 9.4               Magnesium  1.9               MCH 27.7               MCHC 30.5               MCV 91               Microscopic Comment         Comment: Other formed elements not mentioned in the report are not present in the microscopic examination.         Mono # 0.55               Mono % 5.7               MPV 9.8               Neut % 84.4               NITRITE UA       Positive         nRBC 0               Blood, UA       Trace         pH, UA        6.0         Platelet Count 221               Potassium 3.9               PROTEIN TOTAL 6.3               Protein, UA       2+  Comment: Recommend a 24 hour urine protein or a urine protein/creatinine ratio if globulin induced proteinuria is clinically suspected.         RBC 3.28               RBC, UA       5         RDW 14.3               Sodium 143               Spec Grav UA       1.025         Squam Epithel, UA       3         Troponin I   0.037  Comment: STAT, if not done in ED, then at 2nd and 6th hour  The reference interval for Troponin I represents the 99th percentile cutoff for our facility and is consistent with 3rd generation assay performance.   0.056  Comment: STAT, if not done in ED, then at 2nd and 6th hour  The reference interval for Troponin I represents the 99th percentile cutoff for our facility and is consistent with 3rd generation assay performance.           Uric Acid 4.5               Urobilinogen, UA       2.0-3.0         WBC, UA       >100         WBC Clumps, UA       Few         WBC 9.69                                       [P] - Preliminary Result               Significant Imaging: Echocardiogram: Transthoracic echo (TTE) complete (Cupid Only):   Results for orders placed or performed during the hospital encounter of 12/15/22   Echo   Result Value Ref Range    BSA 1.63 m2    LA WIDTH 3.20 cm    IVC diameter 1.31 cm    Left Ventricular Outflow Tract Mean Velocity 0.74 cm/s    Left Ventricular Outflow Tract Mean Gradient 2.35 mmHg    LVIDd 3.59 3.5 - 6.0 cm    IVS 1.08 0.6 - 1.1 cm    PW 1.02 0.6 - 1.1 cm    LVIDs 2.49 2.1 - 4.0 cm    FS 31 28 - 44 %    STJ 2.97 cm    Ascending aorta 3.10 cm    LV mass 115.38 g    LA size 3.31 cm    RVDD 3.34 cm    TAPSE 2.10 cm    Left Ventricle Relative Wall Thickness 0.57 cm    AV regurgitation pressure 1/2 time [id number] ms    AV mean gradient 5 mmHg    AV valve area 1.85 cm2    AV Velocity Ratio 0.64     AV index (prosthetic) 0.75     MV valve  area p 1/2 method 3.24 cm2    E/A ratio 0.99     E wave deceleration time 234.23 msec    IVRT 99.90 msec    LVOT diameter 1.77 cm    LVOT area 2.5 cm2    LVOT peak mehrdad 0.91 m/s    LVOT peak VTI 31.80 cm    Ao peak mehrdad 1.42 m/s    Ao VTI 42.3 cm    RVOT peak mehrdad 0.65 m/s    RVOT peak VTI 21.5 cm    LVOT stroke volume 78.21 cm3    AV peak gradient 8 mmHg    PV mean gradient 1.17 mmHg    MV Peak E Mehrdad 0.77 m/s    AR Max Mehrdad 4.54 m/s    TR Max Mehrdad 3.50 m/s    MV stenosis pressure 1/2 time 67.93 ms    MV Peak A Mehrdad 0.78 m/s    LV Systolic Volume 22.07 mL    LV Systolic Volume Index 13.8 mL/m2    LV Diastolic Volume 54.01 mL    LV Diastolic Volume Index 33.76 mL/m2    LV Mass Index 72 g/m2    RA Major Axis 3.98 cm    Left Atrium Major Axis 4.50 cm    Triscuspid Valve Regurgitation Peak Gradient 49 mmHg    RA Width 2.50 cm    Right Atrial Pressure (from IVC) 3 mmHg    EF 60 %    TV resting pulmonary artery pressure 52 mmHg    Narrative    · The left ventricle is normal in size with concentric remodeling and   normal systolic function.  · The estimated ejection fraction is 60%.  · Normal left ventricular diastolic function.  · Normal right ventricular size with normal right ventricular systolic   function.  · Mild aortic regurgitation.  · There is moderate pulmonary hypertension.  · Normal central venous pressure (3 mmHg).  · The estimated PA systolic pressure is 52 mmHg.

## 2025-04-26 NOTE — PROGRESS NOTES
"[facility]  Progress Note    Patient Name: Leslie Wood  MRN: [id number]  Patient Class: IP- Inpatient   Admission Date: 4/25/2025  Length of Stay: 1 days  Attending Physician: Merna Walton MD  Primary Care Provider: Natalia Gomez MD        Subjective     Principal Problem:Cellulitis of left upper extremity        HPI:  Patient is 84-year-old female with past medical history significant for dementia, anxiety, depression, hypertension, nonischemic cardiomyopathy, CKD stage IIIA, stroke with left hemiparesis, family reports previous episodes of cellulitis, presented to ED from [facility] for evaluation of left hand/wrist erythema/swelling. She has severe dementia and can answer some yes/no questions but is confused at baseline. No family present during my exam, so information obtained via electronic medical records/ED notes. She currently has no complaints, answers "no" to every question I asked her. on arrival to ED, temp 100.8°, heart rate 82, respirations 20, blood pressure 210/82, 98% SpO2 on room air.   Lab workup showed WBC 14.58, hemoglobin 11.0, hematocrit 35.2, platelets 308, sed rate greater than 120, , uric acid 5.7, potassium 3.4, BUN 18, creatinine 0.9, glucose 192, magnesium 2.0, alk-phos 123, albumin 2.1, total bilirubin 0.4, , , , troponin 0.040, lactic acid 1.6, procalcitonin 0.22, urinalysis with positive nitrite, 3+ leukocyte esterase, greater than 100 WBC, many bacteria.  Blood cultures x2 are pending.  Urine culture is pending.  Chest x-ray clear.  X-ray of left hand and left wrist completed with no evident fractures, does show diffuse soft tissue swelling and osteopenia/osteoarthritis with degenerative changes.  EKG shows normal sinus rhythm, heart rate 76, no acute ST or T-wave abnormalities noted.  She answers no when asked if having chest pain or shortness on breath.  Appears comfortable during exam.  While in ED " she was given acetaminophen 1000 mg, normal saline 1 L fluid bolus, IV Zosyn, IV labetalol 10 mg, DuoNeb treatment, and colchicine 1.2 mg p.o..  Blood pressure has improved and most recent is 117/58.  Heart rate around 60.  Temperature improved to 97.6.  Hospital Medicine was consulted for admission due to cellulitis and urinary tract infection.    Overview/Hospital Course:  Pt admitted to Hospital Medicine for medical management of suspected cellulitis of left hand/wrist in the setting of UTI, Hypertensive Urgency, and dementia. ESR/CRP elevated. Uric acid normal. Imaging supports moderate severe osteoarthritis at the first carpal metacarpal joint.  Mild osteoarthritis radiocarpal joint.  Osteopenia and diffuse soft tissue swelling.  CXRY and US abdomen showed no acute findings. IV antibiotics and IVF initiated. Orthopedic Surgery consulted and evaluation completed with no surgical intervention required at this time. Bradycardia (HR 40's) noted to monitor - Cardiology consulted. Of note, pt received Labetalol in ED for BP control. EKG obtained.     Interval History: pt in bed upon exam and cooperative with L-sided weakness noted. Bradycardia on monitor. Cardiology consulted. PT/OT evaluation placed. IV antibiotics continued. Pt is NH resident with daughter at bedside.     Review of Systems   Unable to perform ROS: Dementia     Objective:     Vital Signs (Most Recent):  Temp: 98.2 °F (36.8 °C) (04/26/25 0756)  Pulse: (!) 46 (04/26/25 0916)  Resp: 18 (04/26/25 0756)  BP: (!) 143/71 (04/26/25 0756)  SpO2: 97 % (04/26/25 0756) Vital Signs (24h Range):  Temp:  [97 °F (36.1 °C)-100.8 °F (38.2 °C)] 98.2 °F (36.8 °C)  Pulse:  [37-82] 46  Resp:  [16-29] 18  SpO2:  [93 %-98 %] 97 %  BP: (117-224)/(53-93) 143/71     Weight: 58.2 kg (128 lb 4.9 oz)  Body mass index is 24.24 kg/m².    Intake/Output Summary (Last 24 hours) at 4/26/2025 1140  Last data filed at 4/26/2025 0643  Gross per 24 hour   Intake 656.5 ml   Output --   Net  656.5 ml         Physical Exam  HENT:      Nose: Nose normal.      Mouth/Throat:      Mouth: Mucous membranes are moist.      Pharynx: Oropharynx is clear.   Cardiovascular:      Rate and Rhythm: Regular rhythm. Bradycardia present.      Pulses: Normal pulses.   Pulmonary:      Effort: Pulmonary effort is normal.      Breath sounds: Normal breath sounds.      Comments: Decreased bibasilar  Abdominal:      General: Bowel sounds are normal.      Palpations: Abdomen is soft.   Musculoskeletal:      Left wrist: Swelling present. Decreased range of motion (mild).      Left hand: Swelling and tenderness (mild) present.      Comments: L sided weakness (CVA residual)    Skin:     General: Skin is warm.      Findings: Bruising (L wrist) and erythema (L wrist/hand) present.      Comments: Sacral wound (healing) -bruise to L heel    Neurological:      Mental Status: She is alert. Mental status is at baseline.   Psychiatric:         Behavior: Behavior is cooperative.         Cognition and Memory: Cognition is impaired. Memory is impaired.               Significant Labs: All pertinent labs within the past 24 hours have been reviewed.    Significant Imaging: I have reviewed all pertinent imaging results/findings within the past 24 hours.      Assessment & Plan  Cellulitis of left upper extremity  Treating with Zosyn and Vancomycin (sed rate >120, , lactic acid/procalcitonin normal)  Also given a dose of colchicine per ED, uric acid 5.7 > 4.5  Prednisone 50 mg in ED  Gentle hydration with IV fluids  orthopedic surgery consulted with no surgical intervention at this time      Acute cystitis with hematuria  Follow up urine culture reports  Currently on Vancomycin and Zosyn    Anxiety and depression  Continue home medication--fluoxetine      Hemiplegia and hemiparesis following cerebral infarction affecting left non-dominant side  Supportive care  Fall precautions  PT/OT    Nonischemic cardiomyopathy  Monitor fluid volume  status closely  Per records -- history of Takotsubo cardiomyopathy (apical ballooning syndrome)  No echocardiogram results found for the past 12 months  Echo from 2022  The left ventricle is normal in size with concentric remodeling and normal systolic function.  The estimated ejection fraction is 60%.  Normal left ventricular diastolic function.  Normal right ventricular size with normal right ventricular systolic function.  Mild aortic regurgitation.  There is moderate pulmonary hypertension.  Normal central venous pressure (3 mmHg).  The estimated PA systolic pressure is 52 mmHg.       Moderate vascular dementia with anxiety  Requires nursing home care   Continue nightly olanzapine and daily donepezil  Stage 3a chronic kidney disease  Creatine stable for now. BMP reviewed- noted Estimated Creatinine Clearance: 41.4 mL/min (based on SCr of 0.8 mg/dL). according to latest data. Based on current GFR, CKD stage is stage 3 - GFR 30-59.  Monitor UOP and serial BMP and adjust therapy as needed. Renally dose meds. Avoid nephrotoxic medications and procedures.  -- currently hydrating with IV fluids, monitor I&O/labs  Essential hypertension  Patient's blood pressure range in the last 24 hours was: BP  Min: 117/58  Max: 224/93.The patient's inpatient anti-hypertensive regimen is listed below:  Current Antihypertensives  hydrALAZINE injection 10 mg, Every 6 hours PRN, Intravenous    Plan  - BP was elevated and treated with IV labetalol per ED- Bradycardia on monitor   - Will add PRN hydralazine  Hypokalemia  Patient's most recent potassium results are listed below.   Recent Labs     04/25/25  1651 04/26/25  0536   K 3.4* 3.9     Plan  - Replete potassium per protocol  - Monitor potassium Daily    Elevated LFTs  US of abdomen showed no acute findings -lipoma present  Monitor labs- AST/>76/104>68  Avoid hepatotoxins  No tenderness on exam    VTE Risk Mitigation (From admission, onward)           Ordered     Reason for  No Pharmacological VTE Prophylaxis  Once        Comments: May require procedure   Question:  Reasons:  Answer:  Physician Provided (leave comment)    04/25/25 1951     IP VTE HIGH RISK PATIENT  Once         04/25/25 1951     Place sequential compression device  Until discontinued         04/25/25 1951                    Discharge Planning   JOSE:      Code Status: DNR   Medical Readiness for Discharge Date:   Discharge Plan A: Return to nursing home                Please place Justification for DME        Delmy Gupta NP  Department of Hospital Medicine   [facility] - Telemetry ([facility])

## 2025-04-26 NOTE — SUBJECTIVE & OBJECTIVE
Past Medical History:   Diagnosis Date    Alzheimer's disease, unspecified (CODE)     Arthritis     Cataract     Dementia without behavioral disturbance     GERD (gastroesophageal reflux disease)     Heart attack 05/23/2022    Hypertension     Stroke        Past Surgical History:   Procedure Laterality Date    ADENOIDECTOMY      ADRENAL GLAND SURGERY      APPENDECTOMY      BACK SURGERY      fusion l 4-5 s 1,2,3  fusion l 2-3    CORONARY ANGIOGRAPHY N/A 05/20/2022    Procedure: ANGIOGRAM, CORONARY ARTERY;  Surgeon: Domingo Martins MD;  Location: [facility] CATH LAB;  Service: Cardiology;  Laterality: N/A;    CORONARY ANGIOGRAPHY N/A 05/24/2022    Procedure: ANGIOGRAM, CORONARY ARTERY;  Surgeon: Domingo Martins MD;  Location: [facility] CATH LAB;  Service: Cardiology;  Laterality: N/A;    EYE SURGERY      HEMORRHOID SURGERY      HERNIA REPAIR      HYSTERECTOMY      partial    indirect lumbar decompression      percutaneous placement of interspinous extension blocker    LEFT HEART CATHETERIZATION Left 05/20/2022    Procedure: CATHETERIZATION, HEART, LEFT;  Surgeon: Domingo Martins MD;  Location: [facility] CATH LAB;  Service: Cardiology;  Laterality: Left;    TONSILLECTOMY         Review of patient's allergies indicates:   Allergen Reactions    Amitriptyline     Hydrochlorothiazide      Causes muscle cramping    Lisinopril      hyperkalemia    Opioids - morphine analogues Hallucinations     Chest pain and hallucinations    Oxycodone     Percocet [oxycodone-acetaminophen] Other (See Comments)     Seizures    Belbuca [buprenorphine hcl] Nausea And Vomiting and Other (See Comments)     Black out     Codeine Nausea Only and Rash    Prazosin Other (See Comments)     dizziness       No current facility-administered medications on file prior to encounter.     Current Outpatient Medications on File Prior to Encounter   Medication Sig    aspirin 81 MG Chew Take 81 mg by mouth once daily.    atorvastatin (LIPITOR) 10 MG tablet Take 1 tablet  (10 mg total) by mouth every evening.    donepeziL (ARICEPT) 10 MG tablet TAKE 1 TABLET BY MOUTH EVERY EVENING    FLUoxetine 10 MG capsule Take 10 mg by mouth once daily.    FLUoxetine 20 MG capsule TAKE ONE CAPSULE BY MOUTH ONCE DAILY    furosemide (LASIX) 20 MG tablet Take 1 tablet (20 mg total) by mouth once daily.    losartan (COZAAR) 50 MG tablet Take 1 tablet (50 mg total) by mouth 2 (two) times daily.    metoprolol succinate (TOPROL-XL) 25 MG 24 hr tablet Take 1 tablet (25 mg total) by mouth once daily.    OLANZapine (ZYPREXA) 5 MG tablet Take 1 tablet (5 mg total) by mouth every evening.    b complex vitamins capsule Take 1 capsule by mouth once daily.    cyanocobalamin (VITAMIN B-12) 1000 MCG tablet Take 1 tablet (1,000 mcg total) by mouth once daily.    memantine (NAMENDA) 10 MG Tab TAKE 1 TABLET BY MOUTH TWICE DAILY (Patient not taking: Reported on 4/25/2025)    multivitamin Tab Take 1 tablet by mouth once daily.    [DISCONTINUED] carvediloL (COREG) 6.25 MG tablet Take 1 tablet (6.25 mg total) by mouth 2 (two) times daily. TAKE (1) TABLET BY MOUTH 2 TIMES A DAY WITH MEALS    [DISCONTINUED] cloNIDine (CATAPRES) 0.1 MG tablet Take 1 tablet (0.1 mg total) by mouth 2 (two) times daily. To take an extra dose if BP >160/90    [DISCONTINUED] diclofenac sodium (VOLTAREN) 1 % Gel Apply 2 g topically 3 (three) times daily.    [DISCONTINUED] isosorbide mononitrate (IMDUR) 30 MG 24 hr tablet TAKE 1 TABLET BY MOUTH TWICE A DAY    [DISCONTINUED] nitroGLYCERIN (NITROSTAT) 0.4 MG SL tablet Place 1 tablet (0.4 mg total) under the tongue every 5 (five) minutes as needed for Chest pain.     Family History       Problem Relation (Age of Onset)    Breast cancer Sister    Diabetes Mother    Heart disease Mother    Hypertension Mother, Father    Kidney disease Father          Tobacco Use    Smoking status: Never    Smokeless tobacco: Never   Substance and Sexual Activity    Alcohol use: No    Drug use: No    Sexual activity:  Not Currently     Review of Systems   Reason unable to perform ROS: dementia.   Constitutional:  Positive for fever.   Musculoskeletal:  Positive for joint swelling.        Left hand erythema, swelling     Objective:     Vital Signs (Most Recent):  Temp: 97.6 °F (36.4 °C) (04/25/25 1947)  Pulse: 64 (04/25/25 1957)  Resp: 20 (04/25/25 1947)  BP: (!) 119/53 (04/25/25 1947)  SpO2: (!) 94 % (04/25/25 1947) Vital Signs (24h Range):  Temp:  [97.6 °F (36.4 °C)-100.8 °F (38.2 °C)] 97.6 °F (36.4 °C)  Pulse:  [59-82] 64  Resp:  [16-29] 20  SpO2:  [93 %-98 %] 94 %  BP: (117-224)/(53-93) 119/53        Body mass index is 24.33 kg/m².     Physical Exam  Vitals reviewed.   Constitutional:       General: She is not in acute distress.     Appearance: She is ill-appearing. She is not diaphoretic.      Comments: Elderly female, confused (secondary to severe dementia), calm and cooperative at this time, appears comfortable   HENT:      Head: Normocephalic and atraumatic.      Nose: Nose normal.      Mouth/Throat:      Mouth: Mucous membranes are moist.      Pharynx: Oropharynx is clear.   Eyes:      Extraocular Movements: Extraocular movements intact.      Pupils: Pupils are equal, round, and reactive to light.   Cardiovascular:      Rate and Rhythm: Normal rate and regular rhythm.      Heart sounds: Normal heart sounds.   Pulmonary:      Effort: Pulmonary effort is normal. No respiratory distress.      Breath sounds: Normal breath sounds. No stridor. No wheezing, rhonchi or rales.   Chest:      Chest wall: No tenderness.   Abdominal:      General: Bowel sounds are normal. There is no distension.      Palpations: Abdomen is soft.      Tenderness: There is no abdominal tenderness. There is no right CVA tenderness, left CVA tenderness, guarding or rebound.   Musculoskeletal:         General: Swelling and tenderness present. No signs of injury.      Cervical back: Neck supple.      Comments: Left hand/fingers -- +edema, knuckles swollen,  +erythema, TTP, decreased ROM    Skin:     General: Skin is warm and dry.      Capillary Refill: Capillary refill takes less than 2 seconds.      Findings: Erythema present.   Neurological:      Mental Status: She is alert. Mental status is at baseline. She is disoriented.      Comments: Left sided weakness (baseline from previous stroke)   Psychiatric:         Mood and Affect: Mood normal.         Behavior: Behavior normal.              CRANIAL NERVES     CN III, IV, VI   Pupils are equal, round, and reactive to light.       Significant Labs: All pertinent labs within the past 24 hours have been reviewed.  Recent Lab Results         04/25/25  1704   04/25/25  1651        Procalcitonin   0.22  Comment: A concentration < 0.25 ng/mL represents a low risk of bacterial infection.  Procalcitonin may not be accurate among patients with localized   infection, recent trauma or major surgery, immunosuppressed state,   invasive fungal infection, renal dysfunction. Decisions regarding   initiation or continuation of antibiotic therapy should not be based   solely on procalcitonin levels.       Albumin   2.1       ALP   123       ALT   104       Anion Gap   9       Appearance, UA Hazy         AST   158       Bacteria, UA Many         Baso #   0.02       Basophil %   0.1       Bilirubin (UA) Negative         BILIRUBIN TOTAL   0.4  Comment: For infants and newborns, interpretation of results should be based   on gestational age, weight and in agreement with clinical   observations.    Premature Infant recommended reference ranges:   0-24 hours:  <8.0 mg/dL   24-48 hours: <12.0 mg/dL   3-5 days:    <15.0 mg/dL   6-29 days:   <15.0 mg/dL       BNP   450  Comment: Values of less than 100 pg/ml are consistent with non-CHF populations.        BUN   18       Calcium   8.9       Chloride   105       CO2   29       Color, UA Yellow         Creatinine   0.9       CRP   225.2       eGFR   >60  Comment: Estimated GFR calculated using the  CKD-EPI creatinine (2021) equation.       Eos #   0.00       Eos %   0.0       Glucose   192       Glucose, UA Negative         Gran # (ANC)   12.20       Hematocrit   35.2       Hemoglobin   11.0       Extra Tube Hold for add-ons.  Comment: Auto resulted.      Hold for add-ons.  Comment: Auto resulted.             Hold for add-ons.  Comment: Auto resulted.             Hold for add-ons.  Comment: Auto resulted.          Hyaline Casts, UA 0         Immature Grans (Abs)   0.06  Comment: Mild elevation in immature granulocytes is non specific and can be seen in a variety of conditions including stress response, acute inflammation, trauma and pregnancy. Correlation with other laboratory and clinical findings is essential.       Immature Granulocytes   0.4       Ketones, UA Negative         Lactic Acid Level   1.6       Leukocyte Esterase, UA 3+         Lymph #   1.10       Lymph %   7.5       Magnesium    2.0       MCH   28.4       MCHC   31.3       MCV   91       Microscopic Comment   Comment: Other formed elements not mentioned in the report are not present in the microscopic examination.         Mono #   1.20       Mono %   8.2       MPV   9.8       Neut %   83.8       NITRITE UA Positive         nRBC   0       Blood, UA Trace         pH, UA 6.0         Platelet Count   308       Potassium   3.4       PROTEIN TOTAL   8.2       Protein, UA 2+  Comment: Recommend a 24 hour urine protein or a urine protein/creatinine ratio if globulin induced proteinuria is clinically suspected.         RBC   3.88       RBC, UA 5         RDW   14.6       Sed Rate   >120       Sodium   143       Spec Grav UA 1.025         Squam Epithel, UA 3         Troponin I   0.040  Comment: The reference interval for Troponin I represents the 99th percentile cutoff for our facility and is consistent with 3rd generation assay performance.       Uric Acid   5.7       Urobilinogen, UA 2.0-3.0         WBC, UA >100         WBC Clumps, UA Few         WBC    14.58             EKG reviewed -- SR, HR 76, no acute ST or T wave abnormalities    Significant Imaging: I have reviewed all pertinent imaging results/findings within the past 24 hours.    US abdomen -- pending  CXR -- lungs appear clear  X-Ray left hand/wrist -- no fractures, osteoarthritis, degenerative changes, diffuse soft tissue swelling

## 2025-04-26 NOTE — ASSESSMENT & PLAN NOTE
Patient's blood pressure range in the last 24 hours was: BP  Min: 117/58  Max: 224/93.The patient's inpatient anti-hypertensive regimen is listed below:  Current Antihypertensives  metoprolol succinate (TOPROL-XL) 24 hr tablet 25 mg, Daily, Oral  hydrALAZINE injection 10 mg, Every 6 hours PRN, Intravenous    Plan  - BP was elevated and treated with IV labetalol per ED  - Will add PRN hydralazine

## 2025-04-26 NOTE — ASSESSMENT & PLAN NOTE
Monitor fluid volume status closely  Per records -- history of Takotsubo cardiomyopathy (apical ballooning syndrome)  No echocardiogram results found for the past 12 months  Echo from 2022  The left ventricle is normal in size with concentric remodeling and normal systolic function.  The estimated ejection fraction is 60%.  Normal left ventricular diastolic function.  Normal right ventricular size with normal right ventricular systolic function.  Mild aortic regurgitation.  There is moderate pulmonary hypertension.  Normal central venous pressure (3 mmHg).  The estimated PA systolic pressure is 52 mmHg.

## 2025-04-26 NOTE — PHARMACY MED REC
"Admission Medication History     The home medication history was taken by Yvan Barragan.    You may go to "Admission" then "Reconcile Home Medications" tabs to review and/or act upon these items.     The home medication list has been updated by the Pharmacy department.   Please read ALL comments highlighted in yellow.   Please address this information as you see fit.    Feel free to contact us if you have any questions or require assistance.      Medications listed below were obtained from: Nursing home: [facility]  (Not in a hospital admission)      Yvan Barragan  [phone]    Current Outpatient Medications on File Prior to Encounter   Medication Sig Dispense Refill Last Dose/Taking    aspirin 81 MG Chew Take 81 mg by mouth once daily.   4/25/2025    atorvastatin (LIPITOR) 10 MG tablet Take 1 tablet (10 mg total) by mouth every evening. 90 tablet 3 4/24/2025    donepeziL (ARICEPT) 10 MG tablet TAKE 1 TABLET BY MOUTH EVERY EVENING 90 tablet 0 4/25/2025    FLUoxetine 10 MG capsule Take 10 mg by mouth once daily.   4/25/2025    FLUoxetine 20 MG capsule TAKE ONE CAPSULE BY MOUTH ONCE DAILY 90 capsule 0 4/25/2025    furosemide (LASIX) 20 MG tablet Take 1 tablet (20 mg total) by mouth once daily. 30 tablet 11 4/25/2025    losartan (COZAAR) 50 MG tablet Take 1 tablet (50 mg total) by mouth 2 (two) times daily. 180 tablet 1 4/25/2025    metoprolol succinate (TOPROL-XL) 25 MG 24 hr tablet Take 1 tablet (25 mg total) by mouth once daily. 90 tablet 3 4/25/2025    OLANZapine (ZYPREXA) 5 MG tablet Take 1 tablet (5 mg total) by mouth every evening. 90 tablet 1 4/24/2025    b complex vitamins capsule Take 1 capsule by mouth once daily.       cyanocobalamin (VITAMIN B-12) 1000 MCG tablet Take 1 tablet (1,000 mcg total) by mouth once daily.       memantine (NAMENDA) 10 MG Tab TAKE 1 TABLET BY MOUTH TWICE DAILY (Patient not taking: Reported on 4/25/2025) 180 tablet 0 Not Taking    multivitamin Tab Take 1 tablet by " mouth once daily.                            .

## 2025-04-26 NOTE — CONSULTS
"[facility] - Telemetry ([facility])  Cardiology  Consult Note    Patient Name: Leslie Wood  MRN: [id number]  Admission Date: 4/25/2025  Hospital Length of Stay: 1 days  Code Status: DNR   Attending Provider: Merna Walton MD   Consulting Provider: Domingo Martins MD  Primary Care Physician: Natalia Gomez MD  Principal Problem:Cellulitis of left upper extremity    Patient information was obtained from patient, relative(s), past medical records, and ER records.     Inpatient consult to Cardiology  Consult performed by: Domingo Martins MD  Consult ordered by: Delmy Gupta NP  Reason for consult: CHF        Subjective:     Chief Complaint:  chf     HPI:    84-year-old female with past medical history significant for dementia, anxiety, depression, hypertension, nonischemic cardiomyopathy, CKD stage IIIA, stroke with left hemiparesis, family reports previous episodes of cellulitis, presented to ED from [facility] for evaluation of left hand/wrist erythema/swelling. She has severe dementia and can answer some yes/no questions but is confused at baseline. No family present during my exam, so information obtained via electronic medical records/ED notes. She currently has no complaints, answers "no" to every question I asked her. on arrival to ED, temp 100.8°, heart rate 82, respirations 20, blood pressure 210/82, 98% SpO2 on room air.   Lab workup showed WBC 14.58, hemoglobin 11.0, hematocrit 35.2, platelets 308, sed rate greater than 120, , uric acid 5.7, potassium 3.4, BUN 18, creatinine 0.9, glucose 192, magnesium 2.0, alk-phos 123, albumin 2.1, total bilirubin 0.4, , , , troponin 0.040, lactic acid 1.6, procalcitonin 0.22, urinalysis with positive nitrite, 3+ leukocyte esterase, greater than 100 WBC, many bacteria.  Blood cultures x2 are pending.  Urine culture is pending.  Chest x-ray clear.  X-ray of left hand and left wrist completed with no evident fractures, does show " diffuse soft tissue swelling and osteopenia/osteoarthritis with degenerative changes.  EKG shows normal sinus rhythm, heart rate 76, no acute ST or T-wave abnormalities noted.  She answers no when asked if having chest pain or shortness on breath.  Appears comfortable during exam.  While in ED she was given acetaminophen 1000 mg, normal saline 1 L fluid bolus, IV Zosyn, IV labetalol 10 mg, DuoNeb treatment, and colchicine 1.2 mg p.o..  Blood pressure has improved and most recent is 117/58.  Heart rate around 60.  Temperature improved to 97.6.  Hospital Medicine was consulted for admission due to cellulitis and urinary tract infection.     04/26/2025.    Cardiology has been consulted to evaluate the patient volume status.    Her BNP was 450 on admission baseline is normal.    Troponin is flat.    EKG no dynamic ischemia sinus bradycardia.    Daughter at bedside.    Denies dyspnea    Past Medical History:   Diagnosis Date    Alzheimer's disease, unspecified (CODE)     Arthritis     Cataract     Dementia without behavioral disturbance     GERD (gastroesophageal reflux disease)     Heart attack 05/23/2022    Hypertension     Stroke        Past Surgical History:   Procedure Laterality Date    ADENOIDECTOMY      ADRENAL GLAND SURGERY      APPENDECTOMY      BACK SURGERY      fusion l 4-5 s 1,2,3  fusion l 2-3    CORONARY ANGIOGRAPHY N/A 05/20/2022    Procedure: ANGIOGRAM, CORONARY ARTERY;  Surgeon: Domingo Martins MD;  Location: [facility] CATH LAB;  Service: Cardiology;  Laterality: N/A;    CORONARY ANGIOGRAPHY N/A 05/24/2022    Procedure: ANGIOGRAM, CORONARY ARTERY;  Surgeon: Domingo Martins MD;  Location: [facility] CATH LAB;  Service: Cardiology;  Laterality: N/A;    EYE SURGERY      HEMORRHOID SURGERY      HERNIA REPAIR      HYSTERECTOMY      partial    indirect lumbar decompression      percutaneous placement of interspinous extension blocker    LEFT HEART CATHETERIZATION Left 05/20/2022    Procedure: CATHETERIZATION, HEART,  LEFT;  Surgeon: Domingo Martins MD;  Location: [facility] CATH LAB;  Service: Cardiology;  Laterality: Left;    TONSILLECTOMY         Review of patient's allergies indicates:   Allergen Reactions    Amitriptyline     Hydrochlorothiazide      Causes muscle cramping    Lisinopril      hyperkalemia    Opioids - morphine analogues Hallucinations     Chest pain and hallucinations    Oxycodone     Percocet [oxycodone-acetaminophen] Other (See Comments)     Seizures    Belbuca [buprenorphine hcl] Nausea And Vomiting and Other (See Comments)     Black out     Codeine Nausea Only and Rash    Prazosin Other (See Comments)     dizziness       No current facility-administered medications on file prior to encounter.     Current Outpatient Medications on File Prior to Encounter   Medication Sig    aspirin 81 MG Chew Take 81 mg by mouth once daily.    atorvastatin (LIPITOR) 10 MG tablet Take 1 tablet (10 mg total) by mouth every evening.    donepeziL (ARICEPT) 10 MG tablet TAKE 1 TABLET BY MOUTH EVERY EVENING    FLUoxetine 10 MG capsule Take 10 mg by mouth once daily.    FLUoxetine 20 MG capsule TAKE ONE CAPSULE BY MOUTH ONCE DAILY    furosemide (LASIX) 20 MG tablet Take 1 tablet (20 mg total) by mouth once daily.    losartan (COZAAR) 50 MG tablet Take 1 tablet (50 mg total) by mouth 2 (two) times daily.    metoprolol succinate (TOPROL-XL) 25 MG 24 hr tablet Take 1 tablet (25 mg total) by mouth once daily.    OLANZapine (ZYPREXA) 5 MG tablet Take 1 tablet (5 mg total) by mouth every evening.    b complex vitamins capsule Take 1 capsule by mouth once daily.    cyanocobalamin (VITAMIN B-12) 1000 MCG tablet Take 1 tablet (1,000 mcg total) by mouth once daily.    memantine (NAMENDA) 10 MG Tab TAKE 1 TABLET BY MOUTH TWICE DAILY (Patient not taking: Reported on 4/25/2025)    multivitamin Tab Take 1 tablet by mouth once daily.    [DISCONTINUED] carvediloL (COREG) 6.25 MG tablet Take 1 tablet (6.25 mg total) by mouth 2 (two) times daily. TAKE  (1) TABLET BY MOUTH 2 TIMES A DAY WITH MEALS    [DISCONTINUED] cloNIDine (CATAPRES) 0.1 MG tablet Take 1 tablet (0.1 mg total) by mouth 2 (two) times daily. To take an extra dose if BP >160/90    [DISCONTINUED] diclofenac sodium (VOLTAREN) 1 % Gel Apply 2 g topically 3 (three) times daily.    [DISCONTINUED] isosorbide mononitrate (IMDUR) 30 MG 24 hr tablet TAKE 1 TABLET BY MOUTH TWICE A DAY    [DISCONTINUED] nitroGLYCERIN (NITROSTAT) 0.4 MG SL tablet Place 1 tablet (0.4 mg total) under the tongue every 5 (five) minutes as needed for Chest pain.     Family History       Problem Relation (Age of Onset)    Breast cancer Sister    Diabetes Mother    Heart disease Mother    Hypertension Mother, Father    Kidney disease Father          Tobacco Use    Smoking status: Never    Smokeless tobacco: Never   Substance and Sexual Activity    Alcohol use: No    Drug use: No    Sexual activity: Not Currently     Review of Systems   Cardiovascular:  Positive for leg swelling. Negative for chest pain, dyspnea on exertion, palpitations and syncope.   Neurological:  Negative for focal weakness.     Objective:     Vital Signs (Most Recent):  Temp: 98.3 °F (36.8 °C) (04/26/25 1156)  Pulse: 70 (04/26/25 1337)  Resp: 18 (04/26/25 1156)  BP: (!) 123/59 (04/26/25 1337)  SpO2: 97 % (04/26/25 1156) Vital Signs (24h Range):  Temp:  [97 °F (36.1 °C)-100.8 °F (38.2 °C)] 98.3 °F (36.8 °C)  Pulse:  [37-82] 70  Resp:  [16-29] 18  SpO2:  [93 %-98 %] 97 %  BP: (117-224)/(53-93) 123/59     Weight: 58.2 kg (128 lb 4.9 oz)  Body mass index is 24.24 kg/m².    SpO2: 97 %         Intake/Output Summary (Last 24 hours) at 4/26/2025 1340  Last data filed at 4/26/2025 0643  Gross per 24 hour   Intake 656.5 ml   Output --   Net 656.5 ml       Lines/Drains/Airways       Peripheral Intravenous Line  Duration                  Peripheral IV - Single Lumen 04/25/25 1723 20 G Anterior;Right Forearm <1 day                     Physical Exam  Vitals reviewed.  "  Constitutional:       Appearance: She is well-developed.   Neck:      Vascular: No carotid bruit.   Cardiovascular:      Rate and Rhythm: Normal rate and regular rhythm.      Pulses: Intact distal pulses.      Heart sounds: Normal heart sounds. No murmur heard.  Pulmonary:      Breath sounds: No rales.          Significant Labs: Troponin No results for input(s): "TROPONINIHS" in the last 48 hours., All pertinent lab results from the last 24 hours have been reviewed., and   Recent Lab Results  (Last 5 results in the past 24 hours)        04/26/25  0536   04/26/25  0149   04/25/25 2025 04/25/25  1704   04/25/25  1702        Albumin 1.6               ALP 92               ALT 68               Anion Gap 7               Appearance, UA       Hazy         AST 76               Bacteria, UA       Many         Baso # 0.01               Basophil % 0.1               Bilirubin (UA)       Negative         BILIRUBIN TOTAL 0.4  Comment: For infants and newborns, interpretation of results should be based   on gestational age, weight and in agreement with clinical   observations.    Premature Infant recommended reference ranges:   0-24 hours:  <8.0 mg/dL   24-48 hours: <12.0 mg/dL   3-5 days:    <15.0 mg/dL   6-29 days:   <15.0 mg/dL               BLOOD CULTURE         No Growth After 6 Hours  [P]       BUN 21               Calcium 8.3               Chloride 110               CO2 26               Color, UA       Yellow         CPK     18  Comment: STAT, if not done in ED, then at 2nd and 6th hour           Creatinine 0.8               eGFR >60  Comment: Estimated GFR calculated using the CKD-EPI creatinine (2021) equation.               Eos # 0.00               Eos % 0.0               Glucose 188               Glucose, UA       Negative         Gran # (ANC) 8.18               Hematocrit 29.8               Hemoglobin 9.1               Extra Tube       Hold for add-ons.  Comment: Auto resulted.            Hyaline Casts, UA       " 0         Immature Grans (Abs) 0.04  Comment: Mild elevation in immature granulocytes is non specific and can be seen in a variety of conditions including stress response, acute inflammation, trauma and pregnancy. Correlation with other laboratory and clinical findings is essential.               Immature Granulocytes 0.4               Ketones, UA       Negative         Leukocyte Esterase, UA       3+         Lymph # 0.91               Lymph % 9.4               Magnesium  1.9               MCH 27.7               MCHC 30.5               MCV 91               Microscopic Comment         Comment: Other formed elements not mentioned in the report are not present in the microscopic examination.         Mono # 0.55               Mono % 5.7               MPV 9.8               Neut % 84.4               NITRITE UA       Positive         nRBC 0               Blood, UA       Trace         pH, UA       6.0         Platelet Count 221               Potassium 3.9               PROTEIN TOTAL 6.3               Protein, UA       2+  Comment: Recommend a 24 hour urine protein or a urine protein/creatinine ratio if globulin induced proteinuria is clinically suspected.         RBC 3.28               RBC, UA       5         RDW 14.3               Sodium 143               Spec Grav UA       1.025         Squam Epithel, UA       3         Troponin I   0.037  Comment: STAT, if not done in ED, then at 2nd and 6th hour  The reference interval for Troponin I represents the 99th percentile cutoff for our facility and is consistent with 3rd generation assay performance.   0.056  Comment: STAT, if not done in ED, then at 2nd and 6th hour  The reference interval for Troponin I represents the 99th percentile cutoff for our facility and is consistent with 3rd generation assay performance.           Uric Acid 4.5               Urobilinogen, UA       2.0-3.0         WBC, UA       >100         WBC Clumps, UA       Few         WBC 9.69                                        [P] - Preliminary Result               Significant Imaging: Echocardiogram: Transthoracic echo (TTE) complete (Cupid Only):   Results for orders placed or performed during the hospital encounter of 12/15/22   Echo   Result Value Ref Range    BSA 1.63 m2    LA WIDTH 3.20 cm    IVC diameter 1.31 cm    Left Ventricular Outflow Tract Mean Velocity 0.74 cm/s    Left Ventricular Outflow Tract Mean Gradient 2.35 mmHg    LVIDd 3.59 3.5 - 6.0 cm    IVS 1.08 0.6 - 1.1 cm    PW 1.02 0.6 - 1.1 cm    LVIDs 2.49 2.1 - 4.0 cm    FS 31 28 - 44 %    STJ 2.97 cm    Ascending aorta 3.10 cm    LV mass 115.38 g    LA size 3.31 cm    RVDD 3.34 cm    TAPSE 2.10 cm    Left Ventricle Relative Wall Thickness 0.57 cm    AV regurgitation pressure 1/2 time [id number] ms    AV mean gradient 5 mmHg    AV valve area 1.85 cm2    AV Velocity Ratio 0.64     AV index (prosthetic) 0.75     MV valve area p 1/2 method 3.24 cm2    E/A ratio 0.99     E wave deceleration time 234.23 msec    IVRT 99.90 msec    LVOT diameter 1.77 cm    LVOT area 2.5 cm2    LVOT peak mehrdad 0.91 m/s    LVOT peak VTI 31.80 cm    Ao peak mehrdad 1.42 m/s    Ao VTI 42.3 cm    RVOT peak mehrdad 0.65 m/s    RVOT peak VTI 21.5 cm    LVOT stroke volume 78.21 cm3    AV peak gradient 8 mmHg    PV mean gradient 1.17 mmHg    MV Peak E Mehrdad 0.77 m/s    AR Max Mehrdad 4.54 m/s    TR Max Mehrdad 3.50 m/s    MV stenosis pressure 1/2 time 67.93 ms    MV Peak A Mehrdad 0.78 m/s    LV Systolic Volume 22.07 mL    LV Systolic Volume Index 13.8 mL/m2    LV Diastolic Volume 54.01 mL    LV Diastolic Volume Index 33.76 mL/m2    LV Mass Index 72 g/m2    RA Major Axis 3.98 cm    Left Atrium Major Axis 4.50 cm    Triscuspid Valve Regurgitation Peak Gradient 49 mmHg    RA Width 2.50 cm    Right Atrial Pressure (from IVC) 3 mmHg    EF 60 %    TV resting pulmonary artery pressure 52 mmHg    Narrative    · The left ventricle is normal in size with concentric remodeling and   normal systolic function.  ·  The estimated ejection fraction is 60%.  · Normal left ventricular diastolic function.  · Normal right ventricular size with normal right ventricular systolic   function.  · Mild aortic regurgitation.  · There is moderate pulmonary hypertension.  · Normal central venous pressure (3 mmHg).  · The estimated PA systolic pressure is 52 mmHg.        Assessment and Plan:     * Cellulitis of left upper extremity  Her [facility]    Nonischemic cardiomyopathy  Recovered.    She appears to be euvolemic on exam despite minimal lower extremity swelling which could be dependent.    Monitor intake and output.    Use Lasix IV 20 mg p.r.n. as needed.    Chest x-ray reviewed no signs of fluid overload.    Oxygenation is good      Bradycardia.  Possibly secondary to donepezil  Pronounced at night.  Avoid any AV blocking agents.    Consider stopping donepezil  VTE Risk Mitigation (From admission, onward)           Ordered     Reason for No Pharmacological VTE Prophylaxis  Once        Comments: May require procedure   Question:  Reasons:  Answer:  Physician Provided (leave comment)    04/25/25 1951     IP VTE HIGH RISK PATIENT  Once         04/25/25 1951     Place sequential compression device  Until discontinued         04/25/25 1951                    Thank you for your consult. I will follow-up with patient. Please contact us if you have any additional questions.    Domingo Martins MD  Cardiology   [facility] - Telemetry ([facility])

## 2025-04-26 NOTE — ASSESSMENT & PLAN NOTE
Creatine stable for now. BMP reviewed- noted Estimated Creatinine Clearance: 36.8 mL/min (based on SCr of 0.9 mg/dL). according to latest data. Based on current GFR, CKD stage is stage 3 - GFR 30-59.  Monitor UOP and serial BMP and adjust therapy as needed. Renally dose meds. Avoid nephrotoxic medications and procedures.  -- currently hydrating with IV fluids, monitor I&O/labs

## 2025-04-26 NOTE — PROGRESS NOTES
Pharmacokinetic Initial Assessment: IV Vancomycin    Assessment/Plan:    Initiate intravenous vancomycin with loading dose of 1500 mg once followed by a maintenance dose of vancomycin 750 mg IV every 24 hours  Desired empiric serum trough concentration is 10 to 15 mcg/mL  Draw vancomycin trough level 60 min prior to third dose on 04/27 at approximately 2000  Pharmacy will continue to follow and monitor vancomycin.      Please contact pharmacy at extension [phone] with any questions regarding this assessment.     Thank you for the consult,   Carla Fairbanks       Patient brief summary:  Leslie Wood is a 84 y.o. female initiated on antimicrobial therapy with IV Vancomycin for treatment of suspected skin & soft tissue infection    Drug Allergies:   Review of patient's allergies indicates:   Allergen Reactions    Amitriptyline     Hydrochlorothiazide      Causes muscle cramping    Lisinopril      hyperkalemia    Opioids - morphine analogues Hallucinations     Chest pain and hallucinations    Oxycodone     Percocet [oxycodone-acetaminophen] Other (See Comments)     Seizures    Belbuca [buprenorphine hcl] Nausea And Vomiting and Other (See Comments)     Black out     Codeine Nausea Only and Rash    Prazosin Other (See Comments)     dizziness       Actual Body Weight:   58.2 kg    Renal Function:   Estimated Creatinine Clearance: 36.8 mL/min (based on SCr of 0.9 mg/dL).,     Dialysis Method (if applicable):  N/A    CBC (last 72 hours):  Recent Labs   Lab Result Units 04/25/25  1651   WBC K/uL 14.58*   HGB gm/dL 11.0*   HCT % 35.2*   Platelet Count K/uL 308   Lymph % % 7.5*   Mono % % 8.2   Eos % % 0.0   Basophil % % 0.1       Metabolic Panel (last 72 hours):  Recent Labs   Lab Result Units 04/25/25  1651 04/25/25  1704   Sodium mmol/L 143  --    Potassium mmol/L 3.4*  --    Chloride mmol/L 105  --    CO2 mmol/L 29  --    Glucose mg/dL 192*  --    Glucose, UA   --  Negative   BUN mg/dL 18  --    Creatinine mg/dL 0.9  --   "  Albumin g/dL 2.1*  --    Bilirubin Total mg/dL 0.4  --    ALP unit/L 123  --    AST unit/L 158*  --    ALT unit/L 104*  --    Magnesium  mg/dL 2.0  --        Drug levels (last 3 results):  No results for input(s): "VANCOMYCINRA", "VANCORANDOM", "VANCOMYCINPE", "VANCOPEAK", "VANCOMYCINTR", "VANCOTROUGH" in the last 72 hours.    Microbiologic Results:  Microbiology Results (last 7 days)       Procedure Component Value Units Date/Time    Urine culture [[id number]] Collected: 04/25/25 1704    Order Status: Sent Specimen: Urine, Clean Catch Updated: 04/25/25 1721    Blood culture #2 **CANNOT BE ORDERED STAT** [[id number]] Collected: 04/25/25 1702    Order Status: Sent Specimen: Blood from Peripheral, Antecubital, Right Updated: 04/25/25 1708    Blood culture #1 **CANNOT BE ORDERED STAT** [[id number]] Collected: 04/25/25 1701    Order Status: Sent Specimen: Blood from Peripheral, Forearm, Right Updated: 04/25/25 1708            "

## 2025-04-26 NOTE — PLAN OF CARE
A245/A245 MELANIE  Leslie Wood is a 84 y.o.female admitted on 4/25/2025 for Cellulitis of left upper extremity   Code Status: DNR MRN: [id number]   Review of patient's allergies indicates:   Allergen Reactions    Amitriptyline     Hydrochlorothiazide      Causes muscle cramping    Lisinopril      hyperkalemia    Opioids - morphine analogues Hallucinations     Chest pain and hallucinations    Oxycodone     Percocet [oxycodone-acetaminophen] Other (See Comments)     Seizures    Belbuca [buprenorphine hcl] Nausea And Vomiting and Other (See Comments)     Black out     Codeine Nausea Only and Rash    Prazosin Other (See Comments)     dizziness     Past Medical History:   Diagnosis Date    Alzheimer's disease, unspecified (CODE)     Arthritis     Cataract     Dementia without behavioral disturbance     GERD (gastroesophageal reflux disease)     Heart attack 05/23/2022    Hypertension     Stroke       PRN meds    dextrose 50%, 12.5 g, PRN  dextrose 50%, 25 g, PRN  glucagon (human recombinant), 1 mg, PRN  glucose, 16 g, PRN  glucose, 24 g, PRN  hydrALAZINE, 10 mg, Q6H PRN  naloxone, 0.02 mg, PRN  ondansetron, 4 mg, Q8H PRN  polyethylene glycol, 17 g, Daily PRN  sodium chloride 0.9%, 3 mL, Q8H PRN  vancomycin - pharmacy to dose, , pharmacy to manage frequency      Chart check completed. Will continue plan of care.         [place] Coma Scale Score: 15     Lead Monitored: Lead II Rhythm: normal sinus rhythm    Cardiac/Telemetry Box Number: [id number]  VTE Core Measure: (SCDs) Sequential compression device initiated/maintained Last Bowel Movement: 04/26/25  Diet Consistent Carbohydrate 2000 Calories (up to 75 gm per meal)  Voiding Characteristics: incontinence  Oliver Score: 15  Fall Risk Score: 19  Accucheck []   Freq?      Lines/Drains/Airways       Peripheral Intravenous Line  Duration                  Peripheral IV - Single Lumen 04/25/25 1723 20 G Anterior;Right Forearm 1 day

## 2025-04-26 NOTE — PLAN OF CARE
[facility] - Telemetry (Hospital)  Initial Discharge Assessment       Primary Care Provider: Natalia Gomez MD    Admission Diagnosis: Cellulitis [L03.90]    Admission Date: 4/25/2025  Expected Discharge Date:     Transition of Care Barriers: None    Payor: [employer] [facility] / Plan: [employer] CHOICES / Product Type: Medicare Advantage /     Extended Emergency Contact Information  Primary Emergency Contact: Vidya Abebe Phone: [phone]  Mobile Phone: [phone]  Relation: Daughter   needed? No  Secondary Emergency Contact: Vidya Abebe  Work Phone: [phone]  Relation: Daughter    Discharge Plan A: Return to nursing home         [facility] [address] [address]  [address] [address] [address]  Phone: [phone] Fax: [phone]    [id number] [place] [facility] [place] [facility] [address]  [address]  [place] [address]  Phone: [phone] Fax: [phone]    [facility] [address] [place] [facility] [place] [address]  Phone: [phone] Fax: [phone]      Initial Assessment (most recent)       Adult Discharge Assessment - 04/26/25 1130          Discharge Assessment    Assessment Type Discharge Planning Assessment     Confirmed/corrected address, phone number and insurance Yes     Source of Information family     Does patient/caregiver understand observation status Yes     Communicated JOSE with patient/caregiver Date not available/Unable to determine     People in Home facility resident     Facility Arrived From: [facility]     Do you expect to return to your current living situation? Yes     Equipment Currently Used at Home none     Readmission within 30 days? Yes     Patient currently being followed by outpatient case management? No     Do you currently have service(s) that help you manage your care at home? No     Do you take prescription medications? Yes     Do you have  prescription coverage? Yes     Do you have any problems affording any of your prescribed medications? No     Is the patient taking medications as prescribed? yes     Who is going to help you get home at discharge? [facility]     How do you get to doctors appointments? agency     Are you on dialysis? No     Do you take coumadin? No     Discharge Plan A Return to nursing home     DME Needed Upon Discharge  none     Discharge Plan discussed with: Adult children     Transition of Care Barriers None

## 2025-04-26 NOTE — HPI
" 84-year-old female with past medical history significant for dementia, anxiety, depression, hypertension, nonischemic cardiomyopathy, CKD stage IIIA, stroke with left hemiparesis, family reports previous episodes of cellulitis, presented to ED from [facility] for evaluation of left hand/wrist erythema/swelling. She has severe dementia and can answer some yes/no questions but is confused at baseline. No family present during my exam, so information obtained via electronic medical records/ED notes. She currently has no complaints, answers "no" to every question I asked her. on arrival to ED, temp 100.8°, heart rate 82, respirations 20, blood pressure 210/82, 98% SpO2 on room air.   Lab workup showed WBC 14.58, hemoglobin 11.0, hematocrit 35.2, platelets 308, sed rate greater than 120, , uric acid 5.7, potassium 3.4, BUN 18, creatinine 0.9, glucose 192, magnesium 2.0, alk-phos 123, albumin 2.1, total bilirubin 0.4, , , , troponin 0.040, lactic acid 1.6, procalcitonin 0.22, urinalysis with positive nitrite, 3+ leukocyte esterase, greater than 100 WBC, many bacteria.  Blood cultures x2 are pending.  Urine culture is pending.  Chest x-ray clear.  X-ray of left hand and left wrist completed with no evident fractures, does show diffuse soft tissue swelling and osteopenia/osteoarthritis with degenerative changes.  EKG shows normal sinus rhythm, heart rate 76, no acute ST or T-wave abnormalities noted.  She answers no when asked if having chest pain or shortness on breath.  Appears comfortable during exam.  While in ED she was given acetaminophen 1000 mg, normal saline 1 L fluid bolus, IV Zosyn, IV labetalol 10 mg, DuoNeb treatment, and colchicine 1.2 mg p.o..  Blood pressure has improved and most recent is 117/58.  Heart rate around 60.  Temperature improved to 97.6.  Hospital Medicine was consulted for admission due to cellulitis and urinary tract infection.     04/26/2025.  "   Cardiology has been consulted to evaluate the patient volume status.    Her BNP was 450 on admission baseline is normal.    Troponin is flat.    EKG no dynamic ischemia sinus bradycardia.    Daughter at bedside.    Denies dyspnea

## 2025-04-26 NOTE — CONSULTS
[facility]  Orthopedics  Consult Note    Patient Name: Leslie Wood  MRN: [id number]  Admission Date: 4/25/2025  Hospital Length of Stay: 1 days  Attending Provider: Merna Walton MD  Primary Care Provider: Natalia Gomez MD    Patient information was obtained from patient, relative(s), and ER records.     Inpatient consult to Orthopedics  Consult performed by: Masood Barragan Jr., MD  Consult ordered by: Myranda Gray NP        Subjective:     Principal Problem:Cellulitis of left upper extremity    Chief Complaint:   Chief Complaint   Patient presents with    Cellulitis     Pt states she has cellulitis of her wrist.         HPI: Leslie Wood is a 84 y.o. 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  Nursing home doctor was concerned on Friday that it is not improving so I sent her to the emergency department.  Daughter was present acting as secondary historian.  She denies any injury or trauma.    Past Medical History:   Diagnosis Date    Alzheimer's disease, unspecified (CODE)     Arthritis     Cataract     Dementia without behavioral disturbance     GERD (gastroesophageal reflux disease)     Heart attack 05/23/2022    Hypertension     Stroke        Past Surgical History:   Procedure Laterality Date    ADENOIDECTOMY      ADRENAL GLAND SURGERY      APPENDECTOMY      BACK SURGERY      fusion l 4-5 s 1,2,3  fusion l 2-3    CORONARY ANGIOGRAPHY N/A 05/20/2022    Procedure: ANGIOGRAM, CORONARY ARTERY;  Surgeon: Domingo Martins MD;  Location: [facility] CATH LAB;  Service: Cardiology;  Laterality: N/A;    CORONARY ANGIOGRAPHY N/A 05/24/2022    Procedure: ANGIOGRAM, CORONARY ARTERY;  Surgeon: Domingo Martins MD;  Location: [facility] CATH LAB;  Service: Cardiology;  Laterality: N/A;    EYE SURGERY      HEMORRHOID SURGERY      HERNIA REPAIR      HYSTERECTOMY      partial    indirect lumbar decompression      percutaneous placement of  interspinous extension blocker    LEFT HEART CATHETERIZATION Left 05/20/2022    Procedure: CATHETERIZATION, HEART, LEFT;  Surgeon: Domingo Martins MD;  Location: [facility] CATH LAB;  Service: Cardiology;  Laterality: Left;    TONSILLECTOMY         Review of patient's allergies indicates:   Allergen Reactions    Amitriptyline     Hydrochlorothiazide      Causes muscle cramping    Lisinopril      hyperkalemia    Opioids - morphine analogues Hallucinations     Chest pain and hallucinations    Oxycodone     Percocet [oxycodone-acetaminophen] Other (See Comments)     Seizures    Belbuca [buprenorphine hcl] Nausea And Vomiting and Other (See Comments)     Black out     Codeine Nausea Only and Rash    Prazosin Other (See Comments)     dizziness       Current Facility-Administered Medications   Medication    0.9% NaCl infusion    dextrose 50% injection 12.5 g    dextrose 50% injection 25 g    donepeziL tablet 10 mg    FLUoxetine capsule 10 mg    glucagon (human recombinant) injection 1 mg    glucose chewable tablet 16 g    glucose chewable tablet 24 g    hydrALAZINE injection 10 mg    naloxone 0.4 mg/mL injection 0.02 mg    OLANZapine tablet 5 mg    ondansetron injection 4 mg    piperacillin-tazobactam (ZOSYN) 4.5 g in D5W 100 mL IVPB (MB+)    polyethylene glycol packet 17 g    sodium chloride 0.9% flush 3 mL    vancomycin - pharmacy to dose    vancomycin 750 mg in 0.9% NaCl 250 mL IVPB (admixture device)     Family History       Problem Relation (Age of Onset)    Breast cancer Sister    Diabetes Mother    Heart disease Mother    Hypertension Mother, Father    Kidney disease Father          Tobacco Use    Smoking status: Never    Smokeless tobacco: Never   Substance and Sexual Activity    Alcohol use: No    Drug use: No    Sexual activity: Not Currently     Review of Systems   Constitutional: Negative for chills and decreased appetite.   HENT:  Negative for ear discharge.    Eyes:  Negative for blurred vision.  "  Cardiovascular:  Negative for chest pain.   Respiratory:  Negative for shortness of breath.    Endocrine: Negative for cold intolerance and heat intolerance.   Skin:  Negative for dry skin and itching.   Gastrointestinal:  Negative for abdominal pain.   Genitourinary:  Negative for dysuria.   Neurological:  Negative for focal weakness.   Psychiatric/Behavioral:  Negative for altered mental status.    Allergic/Immunologic: Negative for HIV exposure.     Objective:     Vital Signs (Most Recent):  Temp: 98.2 °F (36.8 °C) (04/26/25 0756)  Pulse: (!) 41 (04/26/25 0800)  Resp: 18 (04/26/25 0756)  BP: (!) 143/71 (04/26/25 0756)  SpO2: 97 % (04/26/25 0756) Vital Signs (24h Range):  Temp:  [97 °F (36.1 °C)-100.8 °F (38.2 °C)] 98.2 °F (36.8 °C)  Pulse:  [37-82] 41  Resp:  [16-29] 18  SpO2:  [93 %-98 %] 97 %  BP: (117-224)/(53-93) 143/71     Weight: 58.2 kg (128 lb 4.9 oz)  Height: 5' 1" (154.9 cm)  Body mass index is 24.24 kg/m².      Intake/Output Summary (Last 24 hours) at 4/26/2025 1004  Last data filed at 4/26/2025 0643  Gross per 24 hour   Intake 656.5 ml   Output --   Net 656.5 ml       Ortho/SPM Exam  Skin is intact no sign of laceration or abrasion  She does have soft tissue swelling about the wrist but her wrist range of motion is pain-free  Tendon function is intact  Neurovascular exam of the hand is normal  She does have a sore on her left heel but it does not appear to be a deep decubitus ulcer and there was no surrounding erythema     GEN: Well developed, well nourished female. AAOX3. No acute distress.   Head: Normocephalic, atraumatic.   Eyes: HENRI  Neck: Trachea is midline, no adenopathy  Resp: Breathing unlabored.  Neuro: Motor function normal, Cranial nerves intact  Psych: Mood and affect pleasant.      Significant Labs: CBC:   Recent Labs   Lab 04/25/25  1651 04/26/25  0536   WBC 14.58* 9.69   HGB 11.0* 9.1*   HCT 35.2* 29.8*    221     CMP:   Recent Labs   Lab 04/25/25  1651 04/26/25  0536   NA " 143 143   K 3.4* 3.9    110   CO2 29 26   BUN 18 21   CREATININE 0.9 0.8   CALCIUM 8.9 8.3*   ALBUMIN 2.1* 1.6*   BILITOT 0.4 0.4   ALKPHOS 123 92   * 76*   * 68*   ANIONGAP 9 7*     Coagulation:   Recent Labs   Lab 04/25/25  1651 04/26/25  0536   LABPROT 8.2 6.3     CRP:   Recent Labs   Lab 04/25/25  1651   .2*     All pertinent labs within the past 24 hours have been reviewed.    Significant Imaging: X-Ray: I have reviewed all pertinent results/findings and my personal findings are:  X-rays show no acute abnormality    Assessment/Plan:     Active Diagnoses:    Diagnosis Date Noted POA    PRINCIPAL PROBLEM:  Cellulitis of left upper extremity [L03.114] 04/25/2025 Yes    Acute cystitis with hematuria [N30.01] 04/25/2025 Yes    Hypokalemia [E87.6] 04/25/2025 Yes    Elevated LFTs [R79.89] 04/25/2025 Yes    Essential hypertension [I10] 12/12/2024 Yes    Stage 3a chronic kidney disease [N18.31] 08/05/2024 Yes    Moderate vascular dementia with anxiety [F01.B4] 06/05/2024 Yes    Nonischemic cardiomyopathy [I42.8]  Yes    Hemiplegia and hemiparesis following cerebral infarction affecting left non-dominant side [I69.354] 03/04/2021 Not Applicable    Anxiety and depression [F41.9, F32.A] 09/06/2016 Yes      Problems Resolved During this Admission:       Assessment:   Left wrist swelling    Plan:   Her left wrist exam is not consistent with septic arthritis.  Her inflammatory numbers are all off the chart but I do not have a great explanation for this.  I will continue to follow    Masood Barragan MD, MD  Orthopedics  [facility]

## 2025-04-26 NOTE — ASSESSMENT & PLAN NOTE
Patient's most recent potassium results are listed below.   Recent Labs     04/25/25  1651   K 3.4*     Plan  - Replete potassium per protocol  - Monitor potassium Daily

## 2025-04-27 PROBLEM — I50.32 CHRONIC CONGESTIVE HEART FAILURE WITH LEFT VENTRICULAR DIASTOLIC DYSFUNCTION: Status: ACTIVE | Noted: 2025-04-27

## 2025-04-27 LAB
ABSOLUTE EOSINOPHIL (OHS): 0.05 K/UL
ABSOLUTE MONOCYTE (OHS): 0.95 K/UL (ref 0.3–1)
ABSOLUTE NEUTROPHIL COUNT (OHS): 8.27 K/UL (ref 1.8–7.7)
ALBUMIN SERPL BCP-MCNC: 1.5 G/DL (ref 3.5–5.2)
ALP SERPL-CCNC: 68 UNIT/L (ref 40–150)
ALT SERPL W/O P-5'-P-CCNC: 51 UNIT/L (ref 10–44)
ANION GAP (OHS): 6 MMOL/L (ref 8–16)
AST SERPL-CCNC: 45 UNIT/L (ref 11–45)
BASOPHILS # BLD AUTO: 0.04 K/UL
BASOPHILS NFR BLD AUTO: 0.4 %
BILIRUB SERPL-MCNC: 0.2 MG/DL (ref 0.1–1)
BUN SERPL-MCNC: 17 MG/DL (ref 8–23)
CALCIUM SERPL-MCNC: 8.4 MG/DL (ref 8.7–10.5)
CHLORIDE SERPL-SCNC: 113 MMOL/L (ref 95–110)
CO2 SERPL-SCNC: 25 MMOL/L (ref 23–29)
CREAT SERPL-MCNC: 0.7 MG/DL (ref 0.5–1.4)
ERYTHROCYTE [DISTWIDTH] IN BLOOD BY AUTOMATED COUNT: 14.6 % (ref 11.5–14.5)
GFR SERPLBLD CREATININE-BSD FMLA CKD-EPI: >60 ML/MIN/1.73/M2
GLUCOSE SERPL-MCNC: 93 MG/DL (ref 70–110)
HCT VFR BLD AUTO: 27.9 % (ref 37–48.5)
HGB BLD-MCNC: 8.6 GM/DL (ref 12–16)
IMM GRANULOCYTES # BLD AUTO: 0.07 K/UL (ref 0–0.04)
IMM GRANULOCYTES NFR BLD AUTO: 0.6 % (ref 0–0.5)
LYMPHOCYTES # BLD AUTO: 1.82 K/UL (ref 1–4.8)
MAGNESIUM SERPL-MCNC: 1.9 MG/DL (ref 1.6–2.6)
MCH RBC QN AUTO: 28.5 PG (ref 27–31)
MCHC RBC AUTO-ENTMCNC: 30.8 G/DL (ref 32–36)
MCV RBC AUTO: 92 FL (ref 82–98)
NUCLEATED RBC (/100WBC) (OHS): 0 /100 WBC
PHOSPHATE SERPL-MCNC: 2.6 MG/DL (ref 2.7–4.5)
PLATELET # BLD AUTO: 262 K/UL (ref 150–450)
PMV BLD AUTO: 10.2 FL (ref 9.2–12.9)
POTASSIUM SERPL-SCNC: 3.1 MMOL/L (ref 3.5–5.1)
PROT SERPL-MCNC: 6 GM/DL (ref 6–8.4)
RBC # BLD AUTO: 3.02 M/UL (ref 4–5.4)
RELATIVE EOSINOPHIL (OHS): 0.4 %
RELATIVE LYMPHOCYTE (OHS): 16.3 % (ref 18–48)
RELATIVE MONOCYTE (OHS): 8.5 % (ref 4–15)
RELATIVE NEUTROPHIL (OHS): 73.8 % (ref 38–73)
SODIUM SERPL-SCNC: 144 MMOL/L (ref 136–145)
WBC # BLD AUTO: 11.2 K/UL (ref 3.9–12.7)

## 2025-04-27 PROCEDURE — 80053 COMPREHEN METABOLIC PANEL: CPT | Performed by: NURSE PRACTITIONER

## 2025-04-27 PROCEDURE — 94640 AIRWAY INHALATION TREATMENT: CPT

## 2025-04-27 PROCEDURE — 84100 ASSAY OF PHOSPHORUS: CPT | Performed by: NURSE PRACTITIONER

## 2025-04-27 PROCEDURE — 25000003 PHARM REV CODE 250: Performed by: STUDENT IN AN ORGANIZED HEALTH CARE EDUCATION/TRAINING PROGRAM

## 2025-04-27 PROCEDURE — 97530 THERAPEUTIC ACTIVITIES: CPT | Mod: CQ

## 2025-04-27 PROCEDURE — 99232 SBSQ HOSP IP/OBS MODERATE 35: CPT | Mod: ,,, | Performed by: ORTHOPAEDIC SURGERY

## 2025-04-27 PROCEDURE — 25000003 PHARM REV CODE 250: Performed by: NURSE PRACTITIONER

## 2025-04-27 PROCEDURE — 63600175 PHARM REV CODE 636 W HCPCS: Performed by: NURSE PRACTITIONER

## 2025-04-27 PROCEDURE — 63600175 PHARM REV CODE 636 W HCPCS: Performed by: INTERNAL MEDICINE

## 2025-04-27 PROCEDURE — 85025 COMPLETE CBC W/AUTO DIFF WBC: CPT | Performed by: NURSE PRACTITIONER

## 2025-04-27 PROCEDURE — 27000221 HC OXYGEN, UP TO 24 HOURS

## 2025-04-27 PROCEDURE — 25000242 PHARM REV CODE 250 ALT 637 W/ HCPCS: Performed by: INTERNAL MEDICINE

## 2025-04-27 PROCEDURE — 83735 ASSAY OF MAGNESIUM: CPT | Performed by: NURSE PRACTITIONER

## 2025-04-27 PROCEDURE — 99233 SBSQ HOSP IP/OBS HIGH 50: CPT | Mod: 25,,, | Performed by: INTERNAL MEDICINE

## 2025-04-27 PROCEDURE — 94761 N-INVAS EAR/PLS OXIMETRY MLT: CPT

## 2025-04-27 PROCEDURE — 99900035 HC TECH TIME PER 15 MIN (STAT)

## 2025-04-27 PROCEDURE — 36415 COLL VENOUS BLD VENIPUNCTURE: CPT | Performed by: NURSE PRACTITIONER

## 2025-04-27 PROCEDURE — 21400001 HC TELEMETRY ROOM

## 2025-04-27 RX ORDER — LOSARTAN POTASSIUM 50 MG/1
50 TABLET ORAL 2 TIMES DAILY
Status: DISCONTINUED | OUTPATIENT
Start: 2025-04-27 | End: 2025-04-29 | Stop reason: HOSPADM

## 2025-04-27 RX ORDER — FUROSEMIDE 10 MG/ML
20 INJECTION INTRAMUSCULAR; INTRAVENOUS DAILY
Status: DISCONTINUED | OUTPATIENT
Start: 2025-04-27 | End: 2025-04-28

## 2025-04-27 RX ORDER — CEFTRIAXONE 1 G/1
1 INJECTION, POWDER, FOR SOLUTION INTRAMUSCULAR; INTRAVENOUS
Status: DISCONTINUED | OUTPATIENT
Start: 2025-04-27 | End: 2025-04-29 | Stop reason: HOSPADM

## 2025-04-27 RX ORDER — ATORVASTATIN CALCIUM 10 MG/1
10 TABLET, FILM COATED ORAL NIGHTLY
Status: DISCONTINUED | OUTPATIENT
Start: 2025-04-27 | End: 2025-04-29 | Stop reason: HOSPADM

## 2025-04-27 RX ORDER — IPRATROPIUM BROMIDE AND ALBUTEROL SULFATE 2.5; .5 MG/3ML; MG/3ML
3 SOLUTION RESPIRATORY (INHALATION) EVERY 4 HOURS PRN
Status: DISCONTINUED | OUTPATIENT
Start: 2025-04-27 | End: 2025-04-29 | Stop reason: HOSPADM

## 2025-04-27 RX ORDER — LOSARTAN POTASSIUM 25 MG/1
25 TABLET ORAL DAILY
Status: DISCONTINUED | OUTPATIENT
Start: 2025-04-27 | End: 2025-04-27

## 2025-04-27 RX ORDER — MEMANTINE HYDROCHLORIDE 10 MG/1
10 TABLET ORAL 2 TIMES DAILY
Status: DISCONTINUED | OUTPATIENT
Start: 2025-04-27 | End: 2025-04-29 | Stop reason: HOSPADM

## 2025-04-27 RX ORDER — NAPROXEN SODIUM 220 MG/1
81 TABLET, FILM COATED ORAL DAILY
Status: DISCONTINUED | OUTPATIENT
Start: 2025-04-28 | End: 2025-04-29 | Stop reason: HOSPADM

## 2025-04-27 RX ORDER — ELECTROLYTES/DEXTROSE
200 SOLUTION, ORAL ORAL
Status: DISCONTINUED | OUTPATIENT
Start: 2025-04-27 | End: 2025-04-27

## 2025-04-27 RX ORDER — FUROSEMIDE 10 MG/ML
40 INJECTION INTRAMUSCULAR; INTRAVENOUS ONCE
Status: COMPLETED | OUTPATIENT
Start: 2025-04-27 | End: 2025-04-27

## 2025-04-27 RX ORDER — CLINDAMYCIN PHOSPHATE 600 MG/50ML
600 INJECTION, SOLUTION INTRAVENOUS
Status: DISCONTINUED | OUTPATIENT
Start: 2025-04-27 | End: 2025-04-29 | Stop reason: HOSPADM

## 2025-04-27 RX ADMIN — FLUOXETINE HYDROCHLORIDE 10 MG: 10 CAPSULE ORAL at 08:04

## 2025-04-27 RX ADMIN — METOPROLOL SUCCINATE 12.5 MG: 25 TABLET, EXTENDED RELEASE ORAL at 03:04

## 2025-04-27 RX ADMIN — DONEPEZIL HYDROCHLORIDE 10 MG: 5 TABLET, FILM COATED ORAL at 08:04

## 2025-04-27 RX ADMIN — Medication 200 ML: at 01:04

## 2025-04-27 RX ADMIN — CEFTRIAXONE 1 G: 1 INJECTION, POWDER, FOR SOLUTION INTRAMUSCULAR; INTRAVENOUS at 03:04

## 2025-04-27 RX ADMIN — ATORVASTATIN CALCIUM 10 MG: 10 TABLET, FILM COATED ORAL at 08:04

## 2025-04-27 RX ADMIN — LOSARTAN POTASSIUM 25 MG: 25 TABLET, FILM COATED ORAL at 08:04

## 2025-04-27 RX ADMIN — PIPERACILLIN SODIUM AND TAZOBACTAM SODIUM 4.5 G: 4; .5 INJECTION, POWDER, FOR SOLUTION INTRAVENOUS at 02:04

## 2025-04-27 RX ADMIN — CLINDAMYCIN IN 5 PERCENT DEXTROSE 600 MG: 12 INJECTION, SOLUTION INTRAVENOUS at 11:04

## 2025-04-27 RX ADMIN — IPRATROPIUM BROMIDE AND ALBUTEROL SULFATE 3 ML: 2.5; .5 SOLUTION RESPIRATORY (INHALATION) at 09:04

## 2025-04-27 RX ADMIN — Medication 200 ML: at 08:04

## 2025-04-27 RX ADMIN — OLANZAPINE 5 MG: 5 TABLET, FILM COATED ORAL at 08:04

## 2025-04-27 RX ADMIN — FUROSEMIDE 20 MG: 10 INJECTION, SOLUTION INTRAMUSCULAR; INTRAVENOUS at 01:04

## 2025-04-27 RX ADMIN — PIPERACILLIN SODIUM AND TAZOBACTAM SODIUM 4.5 G: 4; .5 INJECTION, POWDER, FOR SOLUTION INTRAVENOUS at 08:04

## 2025-04-27 RX ADMIN — HYDRALAZINE HYDROCHLORIDE 10 MG: 20 INJECTION, SOLUTION INTRAMUSCULAR; INTRAVENOUS at 09:04

## 2025-04-27 RX ADMIN — POTASSIUM BICARBONATE 40 MEQ: 391 TABLET, EFFERVESCENT ORAL at 08:04

## 2025-04-27 RX ADMIN — FUROSEMIDE 40 MG: 10 INJECTION, SOLUTION INTRAMUSCULAR; INTRAVENOUS at 09:04

## 2025-04-27 RX ADMIN — LOSARTAN POTASSIUM 50 MG: 50 TABLET, FILM COATED ORAL at 08:04

## 2025-04-27 RX ADMIN — CLINDAMYCIN IN 5 PERCENT DEXTROSE 600 MG: 12 INJECTION, SOLUTION INTRAVENOUS at 04:04

## 2025-04-27 RX ADMIN — SODIUM CHLORIDE: 9 INJECTION, SOLUTION INTRAVENOUS at 06:04

## 2025-04-27 RX ADMIN — MEMANTINE 10 MG: 10 TABLET ORAL at 08:04

## 2025-04-27 NOTE — PROGRESS NOTES
[facility] - Tele[facility] ([facility])  Cardiology  Progress Note    Patient Name: Leslie Wood  MRN: [id number]  Admission Date: 4/25/2025  Hospital Length of Stay: 2 days  Code Status: DNR   Attending Physician: Merna Walton MD   Primary Care Physician: Natalia Gomez MD  Expected Discharge Date:   Principal Problem:Cellulitis of left upper extremity    Subjective:     Hospital Course:   04/26/2025.    Cardiology has been consulted to evaluate the patient volume status.    Her BNP was 450 on admission baseline is normal.    Troponin is flat.    EKG no dynamic ischemia sinus bradycardia.    Daughter at bedside.    Denies dyspnea    April 27, 2025.    Complains of orthopnea today.    Lower extremity swelling.        Interval History:    Review of Systems   Cardiovascular:  Positive for orthopnea. Negative for chest pain, dyspnea on exertion, palpitations and syncope.   Neurological:  Negative for focal weakness.     Objective:     Vital Signs (Most Recent):  Temp: 97.8 °F (36.6 °C) (04/27/25 1154)  Pulse: 80 (04/27/25 1154)  Resp: 18 (04/27/25 1154)  BP: (!) 204/91 (04/27/25 1154)  SpO2: (!) 93 % (04/27/25 1154) Vital Signs (24h Range):  Temp:  [96.8 °F (36 °C)-98.1 °F (36.7 °C)] 97.8 °F (36.6 °C)  Pulse:  [51-91] 80  Resp:  [16-18] 18  SpO2:  [93 %-96 %] 93 %  BP: (121-204)/(59-91) 204/91     Weight: 58.2 kg (128 lb 4.9 oz)  Body mass index is 24.24 kg/m².     SpO2: (!) 93 %         Intake/Output Summary (Last 24 hours) at 4/27/2025 1224  Last data filed at 4/27/2025 0840  Gross per 24 hour   Intake 1273.24 ml   Output --   Net 1273.24 ml       Lines/Drains/Airways       Peripheral Intravenous Line  Duration                  Peripheral IV - Single Lumen 04/25/25 1723 20 G Anterior;Right Forearm 1 day                       Physical Exam  Vitals reviewed.   Constitutional:       Appearance: She is well-developed.   Neck:      Vascular: No carotid bruit.   Cardiovascular:      Rate and Rhythm: Normal rate and regular rhythm.  "     Pulses: Intact distal pulses.      Heart sounds: Normal heart sounds. No murmur heard.  Pulmonary:      Breath sounds: Normal breath sounds.   Musculoskeletal:         General: Swelling present.   Neurological:      Mental Status: She is oriented to person, place, and time.            Significant Labs: Troponin No results for input(s): "TROPONINIHS" in the last 48 hours., All pertinent lab results from the last 24 hours have been reviewed., and   Recent Lab Results         04/27/25  0427        Albumin 1.5       ALP 68       ALT 51       Anion Gap 6       AST 45       Baso # 0.04       Basophil % 0.4       BILIRUBIN TOTAL 0.2  Comment: For infants and newborns, interpretation of results should be based   on gestational age, weight and in agreement with clinical   observations.    Premature Infant recommended reference ranges:   0-24 hours:  <8.0 mg/dL   24-48 hours: <12.0 mg/dL   3-5 days:    <15.0 mg/dL   6-29 days:   <15.0 mg/dL       BUN 17       Calcium 8.4       Chloride 113       CO2 25       Creatinine 0.7       eGFR >60  Comment: Estimated GFR calculated using the CKD-EPI creatinine (2021) equation.       Eos # 0.05       Eos % 0.4       Glucose 93       Gran # (ANC) 8.27       Hematocrit 27.9       Hemoglobin 8.6       Immature Grans (Abs) 0.07  Comment: Mild elevation in immature granulocytes is non specific and can be seen in a variety of conditions including stress response, acute inflammation, trauma and pregnancy. Correlation with other laboratory and clinical findings is essential.       Immature Granulocytes 0.6       Lymph # 1.82       Lymph % 16.3       Magnesium  1.9       MCH 28.5       MCHC 30.8       MCV 92       Mono # 0.95       Mono % 8.5       MPV 10.2       Neut % 73.8       nRBC 0       Phosphorus Level 2.6       Platelet Count 262       Potassium 3.1       PROTEIN TOTAL 6.0       RBC 3.02       RDW 14.6       Sodium 144       WBC 11.20               Significant Imaging: " Echocardiogram: Transthoracic echo (TTE) complete ([facility] Only):   Results for orders placed or performed during the hospital encounter of 12/15/22   Echo   Result Value Ref Range    BSA 1.63 m2    LA WIDTH 3.20 cm    IVC diameter 1.31 cm    Left Ventricular Outflow Tract Mean Velocity 0.74 cm/s    Left Ventricular Outflow Tract Mean Gradient 2.35 mmHg    LVIDd 3.59 3.5 - 6.0 cm    IVS 1.08 0.6 - 1.1 cm    PW 1.02 0.6 - 1.1 cm    LVIDs 2.49 2.1 - 4.0 cm    FS 31 28 - 44 %    STJ 2.97 cm    Ascending aorta 3.10 cm    LV mass 115.38 g    LA size 3.31 cm    RVDD 3.34 cm    TAPSE 2.10 cm    Left Ventricle Relative Wall Thickness 0.57 cm    AV regurgitation pressure 1/2 time [id number] ms    AV mean gradient 5 mmHg    AV valve area 1.85 cm2    AV Velocity Ratio 0.64     AV index (prosthetic) 0.75     MV valve area p 1/2 method 3.24 cm2    E/A ratio 0.99     E wave deceleration time 234.23 msec    IVRT 99.90 msec    LVOT diameter 1.77 cm    LVOT area 2.5 cm2    LVOT peak mehrdad 0.91 m/s    LVOT peak VTI 31.80 cm    Ao peak mehrdad 1.42 m/s    Ao VTI 42.3 cm    RVOT peak mehrdad 0.65 m/s    RVOT peak VTI 21.5 cm    LVOT stroke volume 78.21 cm3    AV peak gradient 8 mmHg    PV mean gradient 1.17 mmHg    MV Peak E Mehrdad 0.77 m/s    AR Max Mehrdad 4.54 m/s    TR Max Mehrdad 3.50 m/s    MV stenosis pressure 1/2 time 67.93 ms    MV Peak A Mehrdad 0.78 m/s    LV Systolic Volume 22.07 mL    LV Systolic Volume Index 13.8 mL/m2    LV Diastolic Volume 54.01 mL    LV Diastolic Volume Index 33.76 mL/m2    LV Mass Index 72 g/m2    RA Major Axis 3.98 cm    Left Atrium Major Axis 4.50 cm    Triscuspid Valve Regurgitation Peak Gradient 49 mmHg    RA Width 2.50 cm    Right Atrial Pressure (from IVC) 3 mmHg    EF 60 %    TV resting pulmonary artery pressure 52 mmHg    Narrative    · The left ventricle is normal in size with concentric remodeling and   normal systolic function.  · The estimated ejection fraction is 60%.  · Normal left ventricular diastolic  function.  · Normal right ventricular size with normal right ventricular systolic   function.  · Mild aortic regurgitation.  · There is moderate pulmonary hypertension.  · Normal central venous pressure (3 mmHg).  · The estimated PA systolic pressure is 52 mmHg.        Assessment and Plan:     Brief HPI:     * Cellulitis of left upper extremity  Her Hospital Medicine and Orthopedics    Chronic congestive heart failure with left ventricular diastolic dysfunction  Patient has Diastolic (HFpEF) heart failure that is Acute on chronic. On presentation their CHF was decompensated. Evidence of decompensated CHF on presentation includes: orthopnea. The etiology of their decompensation is likely dietary indiscretion. Most recent BNP and echo results are listed below.  Recent Labs     04/25/25  1651   *     Latest ECHO  Results for orders placed during the hospital encounter of 12/15/22    Echo    Interpretation Summary  · The left ventricle is normal in size with concentric remodeling and normal systolic function.  · The estimated ejection fraction is 60%.  · Normal left ventricular diastolic function.  · Normal right ventricular size with normal right ventricular systolic function.  · Mild aortic regurgitation.  · There is moderate pulmonary hypertension.  · Normal central venous pressure (3 mmHg).  · The estimated PA systolic pressure is 52 mmHg.    Current Heart Failure Medications  hydrALAZINE injection 10 mg, Every 6 hours PRN, Intravenous  losartan tablet 25 mg, Daily, Oral  furosemide injection 20 mg, Daily, Intravenous    Plan  - Monitor strict I&Os and daily weights.    - Place on telemetry  - Low sodium diet  - Place on fluid restriction of 1.5 L.   - Cardiology has been consulted  - The patient's volume status is worsening as indicated by edema and orthopnea. Will adjust treatment as follows:  Start IV Lasix  - mild fluid overloaded.        Nonischemic cardiomyopathy  Recovered.    She appears to be  euvolemic on exam despite minimal lower extremity swelling which could be dependent.    Monitor intake and output.    Use Lasix IV 20 mg p.r.n. as needed.    Chest x-ray reviewed no signs of fluid overload.    Oxygenation is good        VTE Risk Mitigation (From admission, onward)           Ordered     Reason for No Pharmacological VTE Prophylaxis  Once        Comments: May require procedure   Question:  Reasons:  Answer:  Physician Provided (leave comment)    04/25/25 1951     IP VTE HIGH RISK PATIENT  Once         04/25/25 1951     Place sequential compression device  Until discontinued         04/25/25 1951                    Domingo Martins MD  Cardiology  [facility] - Tele[facility] ([facility])

## 2025-04-27 NOTE — PROGRESS NOTES
"[facility] Medicine  Progress Note    Patient Name: Leslie Wood  MRN: [id number]  Patient Class: IP- Inpatient   Admission Date: 4/25/2025  Length of Stay: 2 days  Attending Physician: Merna Walton MD  Primary Care Provider: Natalia Gomez MD        Subjective     Principal Problem:Cellulitis of left upper extremity      HPI:  Patient is 84-year-old female with past medical history significant for dementia, anxiety, depression, hypertension, nonischemic cardiomyopathy, CKD stage IIIA, stroke with left hemiparesis, family reports previous episodes of cellulitis, presented to ED from [facility] for evaluation of left hand/wrist erythema/swelling. She has severe dementia and can answer some yes/no questions but is confused at baseline. No family present during my exam, so information obtained via electronic medical records/ED notes. She currently has no complaints, answers "no" to every question I asked her. on arrival to ED, temp 100.8°, heart rate 82, respirations 20, blood pressure 210/82, 98% SpO2 on room air.   Lab workup showed WBC 14.58, hemoglobin 11.0, hematocrit 35.2, platelets 308, sed rate greater than 120, , uric acid 5.7, potassium 3.4, BUN 18, creatinine 0.9, glucose 192, magnesium 2.0, alk-phos 123, albumin 2.1, total bilirubin 0.4, , , , troponin 0.040, lactic acid 1.6, procalcitonin 0.22, urinalysis with positive nitrite, 3+ leukocyte esterase, greater than 100 WBC, many bacteria.  Blood cultures x2 are pending.  Urine culture is pending.  Chest x-ray clear.  X-ray of left hand and left wrist completed with no evident fractures, does show diffuse soft tissue swelling and osteopenia/osteoarthritis with degenerative changes.  EKG shows normal sinus rhythm, heart rate 76, no acute ST or T-wave abnormalities noted.  She answers no when asked if having chest pain or shortness on breath.  Appears comfortable during exam.  While in ED " she was given acetaminophen 1000 mg, normal saline 1 L fluid bolus, IV Zosyn, IV labetalol 10 mg, DuoNeb treatment, and colchicine 1.2 mg p.o..  Blood pressure has improved and most recent is 117/58.  Heart rate around 60.  Temperature improved to 97.6.  Hospital Medicine was consulted for admission due to cellulitis and urinary tract infection.    Overview/Hospital Course:  Pt admitted to Hospital Medicine for medical management of suspected cellulitis of left hand/wrist in the setting of UTI, Hypertensive Urgency, and dementia. ESR/CRP elevated. Uric acid normal. Imaging supports moderate severe osteoarthritis at the first carpal metacarpal joint.  Mild osteoarthritis radiocarpal joint.  Osteopenia and diffuse soft tissue swelling.  CXRY and US abdomen showed no acute findings. IV antibiotics and IVF initiated. Orthopedic Surgery consulted and evaluation completed with no surgical intervention required at this time. Bradycardia (HR 40's) noted to monitor - Cardiology consulted. Of note, pt received Labetalol in ED for BP control. EKG obtained. On 4/27/2025, K replaced, AST normalized, and ALT trending down. Bradycardia resolved. Cardiology following with Lasix given. R hand improved with full ROM demonstrated and no pain reported. Orthopedic Surgery to sign off.  IV antibiotics transitioned to Clindamycin and Rocephin.     Interval History: pt in bed upon exam with no signs of acute distress noted. L hand/wrist improved with mild edema and full ROM demonstrated. Orthopedic Surgery signed off. IV Lasix given. Bradycardia following.     Review of Systems   Unable to perform ROS: Dementia     Objective:     Vital Signs (Most Recent):  Temp: 97.8 °F (36.6 °C) (04/27/25 1154)  Pulse: 80 (04/27/25 1154)  Resp: 18 (04/27/25 1154)  BP: (!) 152/82 (04/27/25 1200)  SpO2: (!) 93 % (04/27/25 1154) Vital Signs (24h Range):  Temp:  [96.8 °F (36 °C)-98.1 °F (36.7 °C)] 97.8 °F (36.6 °C)  Pulse:  [56-91] 80  Resp:  [16-18]  18  SpO2:  [93 %-96 %] 93 %  BP: (121-204)/(60-91) 152/82     Weight: 58.2 kg (128 lb 4.9 oz)  Body mass index is 24.24 kg/m².    Intake/Output Summary (Last 24 hours) at 4/27/2025 1437  Last data filed at 4/27/2025 1225  Gross per 24 hour   Intake 1393.24 ml   Output --   Net 1393.24 ml         Physical Exam  HENT:      Nose: Nose normal.      Mouth/Throat:      Mouth: Mucous membranes are moist.      Pharynx: Oropharynx is clear.   Cardiovascular:      Rate and Rhythm: Normal rate and regular rhythm.      Pulses: Normal pulses.   Pulmonary:      Effort: Pulmonary effort is normal.      Breath sounds: Normal breath sounds.      Comments: Decreased bibasilar  Abdominal:      General: Bowel sounds are normal.      Palpations: Abdomen is soft.   Musculoskeletal:      Left wrist: No swelling. Normal range of motion.      Left hand: Swelling (improved) present. No tenderness.      Comments: L sided weakness (CVA residual)    Skin:     General: Skin is warm.      Findings: Bruising (L wrist) present. No erythema.      Comments: Sacral wound (healing) -bruise to L heel    Neurological:      Mental Status: She is alert. Mental status is at baseline.   Psychiatric:         Mood and Affect: Affect is flat.         Behavior: Behavior is cooperative.         Cognition and Memory: Cognition is impaired. Memory is impaired.               Significant Labs: All pertinent labs within the past 24 hours have been reviewed.    Significant Imaging: I have reviewed all pertinent imaging results/findings within the past 24 hours.      Assessment & Plan  Cellulitis of left upper extremity  Treating with Zosyn and Vancomycin (sed rate >120, , lactic acid/procalcitonin normal)  Also given a dose of colchicine per ED, uric acid 5.7 > 4.5  Prednisone 50 mg in ED  Gentle hydration with IV fluids- stopped   -Full ROM with no pain appreciated upon exam   orthopedic surgery consulted with no surgical intervention at this time- will sign  off      Acute cystitis with hematuria  Follow up urine culture reports  Currently on Vancomycin and Zosyn stopped   -Clindamycin and Rocephin initiated    Anxiety and depression  Continue home medication--fluoxetine      Hemiplegia and hemiparesis following cerebral infarction affecting left non-dominant side  Supportive care  Fall precautions  PT/OT- moderate intensity therapy recommended- pt is NH resident- CM consulted to assist with discharge planning     Nonischemic cardiomyopathy  Monitor fluid volume status closely- IV Lasix   Per records -- history of Takotsubo cardiomyopathy (apical ballooning syndrome)  No echocardiogram results found for the past 12 months  Echo from 2022  The left ventricle is normal in size with concentric remodeling and normal systolic function.  The estimated ejection fraction is 60%.  Normal left ventricular diastolic function.  Normal right ventricular size with normal right ventricular systolic function.  Mild aortic regurgitation.  There is moderate pulmonary hypertension.  Normal central venous pressure (3 mmHg).  The estimated PA systolic pressure is 52 mmHg.   -Cardiology following   Moderate vascular dementia with anxiety  Requires nursing home care   Continue nightly olanzapine and daily donepezil  Stage 3a chronic kidney disease  Creatine stable for now. BMP reviewed- noted Estimated Creatinine Clearance: 47.4 mL/min (based on SCr of 0.7 mg/dL). according to latest data. Based on current GFR, CKD stage is stage 3 - GFR 30-59.  Monitor UOP and serial BMP and adjust therapy as needed. Renally dose meds. Avoid nephrotoxic medications and procedures.  Essential hypertension  Patient's blood pressure range in the last 24 hours was: BP  Min: 121/60  Max: 204/91.The patient's inpatient anti-hypertensive regimen is listed below:  Current Antihypertensives  hydrALAZINE injection 10 mg, Every 6 hours PRN, Intravenous  furosemide injection 20 mg, Daily, Intravenous  losartan tablet 50  mg, 2 times daily, Oral    Plan  - BP was elevated and treated with IV labetalol per ED  - Losartan resumed   - Metoprolol held due to recent bradycardia- may resume at lower dose   -Hydralazine PRN   Hypokalemia  Patient's most recent potassium results are listed below.   Recent Labs     04/25/25  1651 04/26/25  0536 04/27/25  0427   K 3.4* 3.9 3.1*     Plan  - Replete potassium per protocol  - Monitor potassium Daily    Elevated LFTs  US of abdomen showed no acute findings -lipoma present  Monitor labs- AST/>76/104>68- AST normalized/ALT 51  Avoid hepatotoxins  No tenderness on exam    Bradycardia  -resolved  -HR 80   -patient on Metoprolol - will resume at decreased dose     Chronic congestive heart failure with left ventricular diastolic dysfunction  Patient has Diastolic (HFpEF) heart failure that is Acute on chronic. On presentation their CHF was decompensated. Evidence of decompensated CHF on presentation includes: orthopnea. The etiology of their decompensation is likely increased fluid intake. Most recent BNP and echo results are listed below.  Recent Labs     04/25/25  1651   *     Latest ECHO  Results for orders placed during the hospital encounter of 12/15/22    Echo    Interpretation Summary  · The left ventricle is normal in size with concentric remodeling and normal systolic function.  · The estimated ejection fraction is 60%.  · Normal left ventricular diastolic function.  · Normal right ventricular size with normal right ventricular systolic function.  · Mild aortic regurgitation.  · There is moderate pulmonary hypertension.  · Normal central venous pressure (3 mmHg).  · The estimated PA systolic pressure is 52 mmHg.    Current Heart Failure Medications  hydrALAZINE injection 10 mg, Every 6 hours PRN, Intravenous  furosemide injection 20 mg, Daily, Intravenous  losartan tablet 50 mg, 2 times daily, Oral    Plan  - Monitor strict I&Os and daily weights.    - Place on telemetry  - Low  sodium diet  - Place on fluid restriction of 1.5 L.   - Cardiology has been consulted  - The patient's volume status is  treated with IV Lasix x 1 dose      VTE Risk Mitigation (From admission, onward)           Ordered     Reason for No Pharmacological VTE Prophylaxis  Once        Comments: May require procedure   Question:  Reasons:  Answer:  Physician Provided (leave comment)    04/25/25 1951     IP VTE HIGH RISK PATIENT  Once         04/25/25 1951     Place sequential compression device  Until discontinued         04/25/25 1951                    Discharge Planning   JOSE:      Code Status: DNR   Medical Readiness for Discharge Date:   Discharge Plan A: Return to nursing home                Please place Justification for DME        Delmy Gupta NP  Department of Hospital Medicine   [facility] - Telemetry ([facility])

## 2025-04-27 NOTE — ASSESSMENT & PLAN NOTE
Supportive care  Fall precautions  PT/OT- moderate intensity therapy recommended- pt is NH resident- CM consulted to assist with discharge planning

## 2025-04-27 NOTE — PT/OT/SLP EVAL
Occupational Therapy   Evaluation    Name: Leslie Wood  MRN: [id number]  Admitting Diagnosis: Cellulitis of left upper extremity  Recent Surgery: Procedure(s) (LRB):  REMOVAL, FOREIGN BODY, UPPER EXTREMITY (Left)      Recommendations:     Discharge Recommendations: Moderate Intensity Therapy  Discharge Equipment Recommendations:  none  Barriers to discharge:  None    Assessment:     Leslie Wood is a 84 y.o. female with a medical diagnosis of Cellulitis of left upper extremity.  She presents with relevant history of dementia and wheelchair/bed bound for 7 weeks. Performance deficits affecting function: weakness, impaired endurance, impaired self care skills, impaired functional mobility, impaired cognition, decreased upper extremity function, decreased lower extremity function, decreased safety awareness, abnormal tone, decreased ROM, decreased coordination.      Rehab Prognosis: Poor; patient would benefit from acute skilled OT services to address these deficits and reach maximum level of function.       Plan:     Patient to be seen 2 x/week to address the above listed problems via self-care/home management, therapeutic activities, therapeutic exercises, wheelchair management/training  Plan of Care Expires: 05/10/25  Plan of Care Reviewed with: patient, daughter    Subjective     Chief Complaint: None stated per patient.   Patient/Family Comments/goals: Family would like her to walk again.   Occupational Profile:  Living Environment: Nursing home resident - memory care.   Previous level of function: Daughter reports the patient has been dependent care for ADLs. Wheelchair mobility.  Roles and Routines: None stated.  Equipment Used at Home: hospital bed  Assistance upon Discharge: Facility staff.     Pain/Comfort:  Pain Rating 1: 0/10    Patients cultural, spiritual, Zoroastrian conflicts given the current situation: no    Objective:     Communicated with: nurse prior to session.  Patient found supine with  peripheral IV, bed alarm upon OT entry to room.    General Precautions: Standard, fall  Orthopedic Precautions: N/A  Braces: N/A  Respiratory Status: Room air    Occupational Performance:    Bed Mobility:    Patient completed Rolling/Turning to Left with  dependent  Patient completed Rolling/Turning to Right with dependent  Patient completed Scooting/Bridging with dependent  Patient completed Supine to Sit with dependent    Functional Mobility/Transfers:  Patient completed Sit <> Stand Transfer with dependence  with  hand-held assist   Functional Mobility: not appropriate at this time.     Activities of Daily Living:  Feeding:  maximal assistance per daughter  Grooming: total assistance      Cognitive/Visual Perceptual:  Cognitive/Psychosocial Skills:     -       Oriented to: Person   -       Follows Commands/attention:Follows one-step commands  -       Memory: Impaired STM, Impaired LTM, and Poor immediate recall    Physical Exam:  Balance:    -       total assist  Postural examination/scapula alignment:    -       Tone throughout  Upper Extremity Range of Motion:     -       Right Upper Extremity: Tone limiting ROM (0-20 deg sh fl)  -       Left Upper Extremity: Tone limiting ROM (0-20 AROM deg sh fl)  Upper Extremity Strength:    -       Right Upper Extremity: Deficits: tone  -       Left Upper Extremity: Deficits: tone   Strength:    -       Right Upper Extremity: fair  -       Left Upper Extremity: fair  Fine Motor Coordination:    -       Impaired  Fine motor coordination tests    Gross motor coordination:   tremors at rest and with movement    AMPAC 6 Click ADL:  AMPAC Total Score: 8    Treatment & Education:  Patient and daughter educated on role of OT in acute care.     Patient left supine with all lines intact, call button in reach, bed alarm on, and daughter present    GOALS:   Multidisciplinary Problems       Occupational Therapy Goals          Problem: Occupational Therapy    Goal Priority  Disciplines Outcome Interventions   Occupational Therapy Goal     OT, PT/OT     Description: Goals to be met by: 5/10/25     Patient will increase functional independence with ADLs by performing:    Feeding with Minimal Assistance.  Grooming while supine with Minimal Assistance.                         DME Justifications:  No DME recommended requiring DME justifications    History:     Past Medical History:   Diagnosis Date    Alzheimer's disease, unspecified (CODE)     Arthritis     Cataract     Dementia without behavioral disturbance     GERD (gastroesophageal reflux disease)     Heart attack 05/23/2022    Hypertension     Stroke          Past Surgical History:   Procedure Laterality Date    ADENOIDECTOMY      ADRENAL GLAND SURGERY      APPENDECTOMY      BACK SURGERY      fusion l 4-5 s 1,2,3  fusion l 2-3    CORONARY ANGIOGRAPHY N/A 05/20/2022    Procedure: ANGIOGRAM, CORONARY ARTERY;  Surgeon: Domingo Martins MD;  Location: [facility] CATH LAB;  Service: Cardiology;  Laterality: N/A;    CORONARY ANGIOGRAPHY N/A 05/24/2022    Procedure: ANGIOGRAM, CORONARY ARTERY;  Surgeon: Domingo Martins MD;  Location: [facility] CATH LAB;  Service: Cardiology;  Laterality: N/A;    EYE SURGERY      HEMORRHOID SURGERY      HERNIA REPAIR      HYSTERECTOMY      partial    indirect lumbar decompression      percutaneous placement of interspinous extension blocker    LEFT HEART CATHETERIZATION Left 05/20/2022    Procedure: CATHETERIZATION, HEART, LEFT;  Surgeon: Domingo Martins MD;  Location: [facility] CATH LAB;  Service: Cardiology;  Laterality: Left;    TONSILLECTOMY         Time Tracking:     OT Date of Treatment: 04/26/25  OT Start Time: 1335  OT Stop Time: 1353  OT Total Time (min): 18 min    Billable Minutes:Evaluation 8  Therapeutic Activity 10    4/26/2025

## 2025-04-27 NOTE — PT/OT/SLP PROGRESS
Physical Therapy  Treatment    Leslie Wood   MRN: [id number]   Admitting Diagnosis: Cellulitis of left upper extremity    PT Received On: 04/27/25  PT Start Time: 0910     PT Stop Time: 0935    PT Total Time (min): 25 min       Billable Minutes:  Therapeutic Activity 25    Treatment Type: Treatment  PT/PTA: PTA     Number of PTA visits since last PT visit: 1       General Precautions: Standard, fall  Orthopedic Precautions: N/A  Braces: N/A  Respiratory Status: Room air    Spiritual, Cultural Beliefs, Rastafarian Practices, Values that Affect Care: no    Subjective:  Communicated with nurse Rust and completed Epic chart review prior to session. Pt agreed to session with encouragement.    Pain/Comfort  Pain Rating 1: 0/10    Objective:   Patient found with: bed alarm, peripheral IV    Rolling left: Mod A  Supine >Sit: Total A x2  Scoot towards EOB: Total A  Pt tolerated sitting EOB for a total of 10 mins focusing on tolerance to upright position and core and trunk stability.  STS from EOB x2 trials: Total A x2 with RW, cues for proper hand placement. Demonstrates FWD flexed posture pt unable to correct. Pt unable to take steps. Pt reports wanting to go to bathroom for BM. Educated pt on requirements to make it to bathroom or commode. Agreed to do bed pan. Pt already soiled CNA present.   Sit > Supine: Total A x2  Supine scoot towards HOB: Total A x2    Performed BLE AROM TE x10ea: AP, LAQ,HIP FLEX    Treatment and Education:  Educated pt on benefits of increasing time spent EOB/OOB. Encouraged pt to sit with bed in chair position for all meals. Encouraged pt to perform therex throughout the day in all available planes to improve strength. Reviewed call don't fall policy and to call for assistance with all OOB needs. Pt agreed to safety request.     AM-PAC 6 CLICK MOBILITY  How much help from another person does this patient currently need?   1 = Unable, Total/Dependent Assistance  2 = A lot, Maximum/Moderate  Assistance  3 = A little, Minimum/Contact Guard/Supervision  4 = None, Modified [place]/Independent    Turning over in bed (including adjusting bedclothes, sheets and blankets)?: 2  Sitting down on and standing up from a chair with arms (e.g., wheelchair, bedside commode, etc.): 2  Moving from lying on back to sitting on the side of the bed?: 2  Moving to and from a bed to a chair (including a wheelchair)?: 1  Need to walk in hospital room?: 1  Climbing 3-5 steps with a railing?: 1  Basic Mobility Total Score: 9    AM-PAC Raw Score CMS G-Code Modifier Level of Impairment Assistance   6 % Total / Unable   7 - 9 CM 80 - 100% Maximal Assist   10 - 14 CL 60 - 80% Moderate Assist   15 - 19 CK 40 - 60% Moderate Assist   20 - 22 CJ 20 - 40% Minimal Assist   23 CI 1-20% SBA / CGA   24 CH 0% Independent/ Mod I     Patient left HOB elevated with all lines intact, call button in reach, and daughter and CNA present.    Assessment:  Leslie Wood is a 84 y.o. female with a medical diagnosis of Cellulitis of left upper extremity and presents with the following functional limitations. Pt will continue to benefit from skilled PT in order to address the below deficits to decrease caregiver burden.    Rehab identified problem list/impairments: impaired endurance, weakness, impaired cognition, decreased coordination, gait instability, impaired functional mobility, decreased safety awareness, pain    Rehab potential is fair.    Activity tolerance: Fair    Discharge recommendations: Moderate Intensity Therapy      Barriers to discharge:      Equipment recommendations: none     GOALS:   Multidisciplinary Problems       Physical Therapy Goals          Problem: Physical Therapy    Goal Priority Disciplines Outcome Interventions   Physical Therapy Goal     PT, PT/OT     Description: Pt will perform bed mobility with mod A in order to participate in EOB activity.  Pt will perform transfers with mod A in order to participate in  OOB activity.  Pt will ambulate 50 ft mod A with LRAD in order to participate in daily tasks.   Pt will improve functional mobility as evidenced by 2 pt improvement in AMPAC score.                       DME Justifications:  No DME recommended requiring DME justifications    PLAN:    Patient to be seen 3 x/week to address the above listed problems via gait training, therapeutic activities, therapeutic exercises, neuromuscular re-education  Plan of Care expires: 05/10/25  Plan of Care reviewed with: patient, daughter         04/27/2025

## 2025-04-27 NOTE — HOSPITAL COURSE
04/26/2025.    Cardiology has been consulted to evaluate the patient volume status.    Her BNP was 450 on admission baseline is normal.    Troponin is flat.    EKG no dynamic ischemia sinus bradycardia.    Daughter at bedside.    Denies dyspnea    April 27, 2025.    Complains of orthopnea today.    Lower extremity swelling.      April 28, 2025.    Looking better.  Denies orthopnea.    Leg swelling resolves.    Received 2 doses of Lasix yesterday 1 and today 1

## 2025-04-27 NOTE — ASSESSMENT & PLAN NOTE
Patient's most recent potassium results are listed below.   Recent Labs     04/25/25  1651 04/26/25  0536 04/27/25  0427   K 3.4* 3.9 3.1*     Plan  - Replete potassium per protocol  - Monitor potassium Daily

## 2025-04-27 NOTE — ASSESSMENT & PLAN NOTE
Creatine stable for now. BMP reviewed- noted Estimated Creatinine Clearance: 47.4 mL/min (based on SCr of 0.7 mg/dL). according to latest data. Based on current GFR, CKD stage is stage 3 - GFR 30-59.  Monitor UOP and serial BMP and adjust therapy as needed. Renally dose meds. Avoid nephrotoxic medications and procedures.

## 2025-04-27 NOTE — PROGRESS NOTES
Therapy with Vancomycin complete and/or consult discontinued by provider.  Pharmacy will sign off, please re-consult as needed.     Jessica Donahue D 4/27/2025 2:15 PM

## 2025-04-27 NOTE — ASSESSMENT & PLAN NOTE
Treating with Zosyn and Vancomycin (sed rate >120, , lactic acid/procalcitonin normal)  Also given a dose of colchicine per ED, uric acid 5.7 > 4.5  Prednisone 50 mg in ED  Gentle hydration with IV fluids- stopped   -Full ROM with no pain appreciated upon exam   orthopedic surgery consulted with no surgical intervention at this time- will sign off

## 2025-04-27 NOTE — ASSESSMENT & PLAN NOTE
Patient has Diastolic (HFpEF) heart failure that is Acute on chronic. On presentation their CHF was decompensated. Evidence of decompensated CHF on presentation includes: orthopnea. The etiology of their decompensation is likely dietary indiscretion. Most recent BNP and echo results are listed below.  Recent Labs     04/25/25  1651   *     Latest ECHO  Results for orders placed during the hospital encounter of 12/15/22    Echo    Interpretation Summary  · The left ventricle is normal in size with concentric remodeling and normal systolic function.  · The estimated ejection fraction is 60%.  · Normal left ventricular diastolic function.  · Normal right ventricular size with normal right ventricular systolic function.  · Mild aortic regurgitation.  · There is moderate pulmonary hypertension.  · Normal central venous pressure (3 mmHg).  · The estimated PA systolic pressure is 52 mmHg.    Current Heart Failure Medications  hydrALAZINE injection 10 mg, Every 6 hours PRN, Intravenous  losartan tablet 25 mg, Daily, Oral  furosemide injection 20 mg, Daily, Intravenous    Plan  - Monitor strict I&Os and daily weights.    - Place on telemetry  - Low sodium diet  - Place on fluid restriction of 1.5 L.   - Cardiology has been consulted  - The patient's volume status is worsening as indicated by edema and orthopnea. Will adjust treatment as follows:  Start IV Lasix  - mild fluid overloaded.

## 2025-04-27 NOTE — ASSESSMENT & PLAN NOTE
Follow up urine culture reports  Currently on Vancomycin and Zosyn stopped   -Clindamycin and Rocephin initiated

## 2025-04-27 NOTE — SUBJECTIVE & OBJECTIVE
"Interval History:    Review of Systems   Cardiovascular:  Positive for orthopnea. Negative for chest pain, dyspnea on exertion, palpitations and syncope.   Neurological:  Negative for focal weakness.     Objective:     Vital Signs (Most Recent):  Temp: 97.8 °F (36.6 °C) (04/27/25 1154)  Pulse: 80 (04/27/25 1154)  Resp: 18 (04/27/25 1154)  BP: (!) 204/91 (04/27/25 1154)  SpO2: (!) 93 % (04/27/25 1154) Vital Signs (24h Range):  Temp:  [96.8 °F (36 °C)-98.1 °F (36.7 °C)] 97.8 °F (36.6 °C)  Pulse:  [51-91] 80  Resp:  [16-18] 18  SpO2:  [93 %-96 %] 93 %  BP: (121-204)/(59-91) 204/91     Weight: 58.2 kg (128 lb 4.9 oz)  Body mass index is 24.24 kg/m².     SpO2: (!) 93 %         Intake/Output Summary (Last 24 hours) at 4/27/2025 1224  Last data filed at 4/27/2025 0840  Gross per 24 hour   Intake 1273.24 ml   Output --   Net 1273.24 ml       Lines/Drains/Airways       Peripheral Intravenous Line  Duration                  Peripheral IV - Single Lumen 04/25/25 1723 20 G Anterior;Right Forearm 1 day                       Physical Exam  Vitals reviewed.   Constitutional:       Appearance: She is well-developed.   Neck:      Vascular: No carotid bruit.   Cardiovascular:      Rate and Rhythm: Normal rate and regular rhythm.      Pulses: Intact distal pulses.      Heart sounds: Normal heart sounds. No murmur heard.  Pulmonary:      Breath sounds: Normal breath sounds.   Musculoskeletal:         General: Swelling present.   Neurological:      Mental Status: She is oriented to person, place, and time.            Significant Labs: Troponin No results for input(s): "TROPONINIHS" in the last 48 hours., All pertinent lab results from the last 24 hours have been reviewed., and   Recent Lab Results         04/27/25  0427        Albumin 1.5       ALP 68       ALT 51       Anion Gap 6       AST 45       Baso # 0.04       Basophil % 0.4       BILIRUBIN TOTAL 0.2  Comment: For infants and newborns, interpretation of results should be based "   on gestational age, weight and in agreement with clinical   observations.    Premature Infant recommended reference ranges:   0-24 hours:  <8.0 mg/dL   24-48 hours: <12.0 mg/dL   3-5 days:    <15.0 mg/dL   6-29 days:   <15.0 mg/dL       BUN 17       Calcium 8.4       Chloride 113       CO2 25       Creatinine 0.7       eGFR >60  Comment: Estimated GFR calculated using the CKD-EPI creatinine (2021) equation.       Eos # 0.05       Eos % 0.4       Glucose 93       Gran # (ANC) 8.27       Hematocrit 27.9       Hemoglobin 8.6       Immature Grans (Abs) 0.07  Comment: Mild elevation in immature granulocytes is non specific and can be seen in a variety of conditions including stress response, acute inflammation, trauma and pregnancy. Correlation with other laboratory and clinical findings is essential.       Immature Granulocytes 0.6       Lymph # 1.82       Lymph % 16.3       Magnesium  1.9       MCH 28.5       MCHC 30.8       MCV 92       Mono # 0.95       Mono % 8.5       MPV 10.2       Neut % 73.8       nRBC 0       Phosphorus Level 2.6       Platelet Count 262       Potassium 3.1       PROTEIN TOTAL 6.0       RBC 3.02       RDW 14.6       Sodium 144       WBC 11.20               Significant Imaging: Echocardiogram: Transthoracic echo (TTE) complete ([facility]upid Only):   Results for orders placed or performed during the hospital encounter of 12/15/22   Echo   Result Value Ref Range    BSA 1.63 m2    LA WIDTH 3.20 cm    IVC diameter 1.31 cm    Left Ventricular Outflow Tract Mean Velocity 0.74 cm/s    Left Ventricular Outflow Tract Mean Gradient 2.35 mmHg    LVIDd 3.59 3.5 - 6.0 cm    IVS 1.08 0.6 - 1.1 cm    PW 1.02 0.6 - 1.1 cm    LVIDs 2.49 2.1 - 4.0 cm    FS 31 28 - 44 %    STJ 2.97 cm    Ascending aorta 3.10 cm    LV mass 115.38 g    LA size 3.31 cm    RVDD 3.34 cm    TAPSE 2.10 cm    Left Ventricle Relative Wall Thickness 0.57 cm    AV regurgitation pressure 1/2 time [id number] ms    AV mean gradient 5 mmHg     AV valve area 1.85 cm2    AV Velocity Ratio 0.64     AV index (prosthetic) 0.75     MV valve area p 1/2 method 3.24 cm2    E/A ratio 0.99     E wave deceleration time 234.23 msec    IVRT 99.90 msec    LVOT diameter 1.77 cm    LVOT area 2.5 cm2    LVOT peak mehrdad 0.91 m/s    LVOT peak VTI 31.80 cm    Ao peak mehrdad 1.42 m/s    Ao VTI 42.3 cm    RVOT peak mehrdad 0.65 m/s    RVOT peak VTI 21.5 cm    LVOT stroke volume 78.21 cm3    AV peak gradient 8 mmHg    PV mean gradient 1.17 mmHg    MV Peak E Mehrdad 0.77 m/s    AR Max Mehrdad 4.54 m/s    TR Max Mehrdad 3.50 m/s    MV stenosis pressure 1/2 time 67.93 ms    MV Peak A Mehrdad 0.78 m/s    LV Systolic Volume 22.07 mL    LV Systolic Volume Index 13.8 mL/m2    LV Diastolic Volume 54.01 mL    LV Diastolic Volume Index 33.76 mL/m2    LV Mass Index 72 g/m2    RA Major Axis 3.98 cm    Left Atrium Major Axis 4.50 cm    Triscuspid Valve Regurgitation Peak Gradient 49 mmHg    RA Width 2.50 cm    Right Atrial Pressure (from IVC) 3 mmHg    EF 60 %    TV resting pulmonary artery pressure 52 mmHg    Narrative    · The left ventricle is normal in size with concentric remodeling and   normal systolic function.  · The estimated ejection fraction is 60%.  · Normal left ventricular diastolic function.  · Normal right ventricular size with normal right ventricular systolic   function.  · Mild aortic regurgitation.  · There is moderate pulmonary hypertension.  · Normal central venous pressure (3 mmHg).  · The estimated PA systolic pressure is 52 mmHg.

## 2025-04-27 NOTE — ASSESSMENT & PLAN NOTE
Patient's blood pressure range in the last 24 hours was: BP  Min: 121/60  Max: 204/91.The patient's inpatient anti-hypertensive regimen is listed below:  Current Antihypertensives  hydrALAZINE injection 10 mg, Every 6 hours PRN, Intravenous  furosemide injection 20 mg, Daily, Intravenous  losartan tablet 50 mg, 2 times daily, Oral    Plan  - BP was elevated and treated with IV labetalol per ED  - Losartan resumed   - Metoprolol held due to recent bradycardia- may resume at lower dose   -Hydralazine PRN

## 2025-04-27 NOTE — PLAN OF CARE
A245/A245 SURESHHeron Wood is a 84 y.o.female admitted on 4/25/2025 for Cellulitis of left upper extremity   Code Status: DNR MRN: [id number]   Review of patient's allergies indicates:   Allergen Reactions    Amitriptyline     Hydrochlorothiazide      Causes muscle cramping    Lisinopril      hyperkalemia    Opioids - morphine analogues Hallucinations     Chest pain and hallucinations    Oxycodone     Percocet [oxycodone-acetaminophen] Other (See Comments)     Seizures    Belbuca [buprenorphine hcl] Nausea And Vomiting and Other (See Comments)     Black out     Codeine Nausea Only and Rash    Prazosin Other (See Comments)     dizziness     Past Medical History:   Diagnosis Date    Alzheimer's disease, unspecified (CODE)     Arthritis     Cataract     Dementia without behavioral disturbance     GERD (gastroesophageal reflux disease)     Heart attack 05/23/2022    Hypertension     Stroke       PRN meds    dextrose 50%, 12.5 g, PRN  dextrose 50%, 25 g, PRN  glucagon (human recombinant), 1 mg, PRN  glucose, 16 g, PRN  glucose, 24 g, PRN  hydrALAZINE, 10 mg, Q6H PRN  naloxone, 0.02 mg, PRN  ondansetron, 4 mg, Q8H PRN  polyethylene glycol, 17 g, Daily PRN  sodium chloride 0.9%, 3 mL, Q8H PRN  vancomycin - pharmacy to dose, , pharmacy to manage frequency      Chart check completed.         Oakley Coma Scale Score: 15     Lead Monitored: Lead II Rhythm: normal sinus rhythm    Cardiac/Telemetry Box Number: [id number]  VTE Core Measure: (SCDs) Sequential compression device initiated/maintained Last Bowel Movement: 04/26/25  Diet Consistent Carbohydrate 2000 Calories (up to 75 gm per meal)  Voiding Characteristics: incontinence  Oliver Score: 16  Fall Risk Score: 16       Lines/Drains/Airways       Peripheral Intravenous Line  Duration                  Peripheral IV - Single Lumen 04/25/25 1723 20 G Anterior;Right Forearm 1 day                         Problem: Adult Inpatient Plan of Care  Goal: Plan of Care Review  Outcome:  Progressing  Goal: Patient-Specific Goal (Individualized)  Outcome: Progressing  Goal: Absence of Hospital-Acquired Illness or Injury  Outcome: Progressing  Goal: Optimal Comfort and Wellbeing  Outcome: Progressing  Goal: Readiness for Transition of Care  Outcome: Progressing     Problem: Infection  Goal: Absence of Infection Signs and Symptoms  Outcome: Progressing     Problem: Skin Injury Risk Increased  Goal: Skin Health and Integrity  Outcome: Progressing     Problem: Wound  Goal: Optimal Coping  Outcome: Progressing  Goal: Optimal Functional Ability  Outcome: Progressing  Goal: Absence of Infection Signs and Symptoms  Outcome: Progressing  Goal: Improved Oral Intake  Outcome: Progressing  Goal: Optimal Pain Control and Function  Outcome: Progressing  Goal: Skin Health and Integrity  Outcome: Progressing  Goal: Optimal Wound Healing  Outcome: Progressing     Problem: Fall Injury Risk  Goal: Absence of Fall and Fall-Related Injury  Outcome: Progressing

## 2025-04-27 NOTE — ASSESSMENT & PLAN NOTE
Patient has Diastolic (HFpEF) heart failure that is Acute on chronic. On presentation their CHF was decompensated. Evidence of decompensated CHF on presentation includes: orthopnea. The etiology of their decompensation is likely increased fluid intake. Most recent BNP and echo results are listed below.  Recent Labs     04/25/25  1651   *     Latest ECHO  Results for orders placed during the hospital encounter of 12/15/22    Echo    Interpretation Summary  · The left ventricle is normal in size with concentric remodeling and normal systolic function.  · The estimated ejection fraction is 60%.  · Normal left ventricular diastolic function.  · Normal right ventricular size with normal right ventricular systolic function.  · Mild aortic regurgitation.  · There is moderate pulmonary hypertension.  · Normal central venous pressure (3 mmHg).  · The estimated PA systolic pressure is 52 mmHg.    Current Heart Failure Medications  hydrALAZINE injection 10 mg, Every 6 hours PRN, Intravenous  furosemide injection 20 mg, Daily, Intravenous  losartan tablet 50 mg, 2 times daily, Oral    Plan  - Monitor strict I&Os and daily weights.    - Place on telemetry  - Low sodium diet  - Place on fluid restriction of 1.5 L.   - Cardiology has been consulted  - The patient's volume status is treated with IV Lasix x 1 dose

## 2025-04-27 NOTE — PROGRESS NOTES
No complaints of wrist pain  She is moving it through full motion today pain-free  Daughter was with her today and I answered all of their questions  She has a UTI which is likely the source of her elevated inflammatory parameters  Orthopedics will sign off for now, call if there are any questions going forward

## 2025-04-27 NOTE — ASSESSMENT & PLAN NOTE
Monitor fluid volume status closely- IV Lasix   Per records -- history of Takotsubo cardiomyopathy (apical ballooning syndrome)  No echocardiogram results found for the past 12 months  Echo from 2022  The left ventricle is normal in size with concentric remodeling and normal systolic function.  The estimated ejection fraction is 60%.  Normal left ventricular diastolic function.  Normal right ventricular size with normal right ventricular systolic function.  Mild aortic regurgitation.  There is moderate pulmonary hypertension.  Normal central venous pressure (3 mmHg).  The estimated PA systolic pressure is 52 mmHg.   -Cardiology following

## 2025-04-27 NOTE — SUBJECTIVE & OBJECTIVE
Interval History: pt in bed upon exam with no signs of acute distress noted. L hand/wrist improved with mild edema and full ROM demonstrated. Orthopedic Surgery signed off. IV Lasix x 1 dose given. Bradycardia following.     Review of Systems   Unable to perform ROS: Dementia     Objective:     Vital Signs (Most Recent):  Temp: 97.8 °F (36.6 °C) (04/27/25 1154)  Pulse: 80 (04/27/25 1154)  Resp: 18 (04/27/25 1154)  BP: (!) 152/82 (04/27/25 1200)  SpO2: (!) 93 % (04/27/25 1154) Vital Signs (24h Range):  Temp:  [96.8 °F (36 °C)-98.1 °F (36.7 °C)] 97.8 °F (36.6 °C)  Pulse:  [56-91] 80  Resp:  [16-18] 18  SpO2:  [93 %-96 %] 93 %  BP: (121-204)/(60-91) 152/82     Weight: 58.2 kg (128 lb 4.9 oz)  Body mass index is 24.24 kg/m².    Intake/Output Summary (Last 24 hours) at 4/27/2025 1437  Last data filed at 4/27/2025 1225  Gross per 24 hour   Intake 1393.24 ml   Output --   Net 1393.24 ml         Physical Exam  HENT:      Nose: Nose normal.      Mouth/Throat:      Mouth: Mucous membranes are moist.      Pharynx: Oropharynx is clear.   Cardiovascular:      Rate and Rhythm: Normal rate and regular rhythm.      Pulses: Normal pulses.   Pulmonary:      Effort: Pulmonary effort is normal.      Breath sounds: Normal breath sounds.      Comments: Decreased bibasilar  Abdominal:      General: Bowel sounds are normal.      Palpations: Abdomen is soft.   Musculoskeletal:      Left wrist: No swelling. Normal range of motion.      Left hand: Swelling (improved) present. No tenderness.      Comments: L sided weakness (CVA residual)    Skin:     General: Skin is warm.      Findings: Bruising (L wrist) present. No erythema.      Comments: Sacral wound (healing) -bruise to L heel    Neurological:      Mental Status: She is alert. Mental status is at baseline.   Psychiatric:         Mood and Affect: Affect is flat.         Behavior: Behavior is cooperative.         Cognition and Memory: Cognition is impaired. Memory is impaired.                Significant Labs: All pertinent labs within the past 24 hours have been reviewed.    Significant Imaging: I have reviewed all pertinent imaging results/findings within the past 24 hours.

## 2025-04-27 NOTE — ASSESSMENT & PLAN NOTE
US of abdomen showed no acute findings -lipoma present  Monitor labs- AST/>76/104>68- AST normalized/ALT 51  Avoid hepatotoxins  No tenderness on exam

## 2025-04-28 PROBLEM — D64.9 ANEMIA: Status: ACTIVE | Noted: 2025-04-28

## 2025-04-28 PROBLEM — E83.39 HYPOPHOSPHATEMIA: Status: ACTIVE | Noted: 2025-04-28

## 2025-04-28 LAB
ABSOLUTE EOSINOPHIL (OHS): 0.08 K/UL
ABSOLUTE MONOCYTE (OHS): 1.03 K/UL (ref 0.3–1)
ABSOLUTE NEUTROPHIL COUNT (OHS): 7.37 K/UL (ref 1.8–7.7)
ALBUMIN SERPL BCP-MCNC: 1.6 G/DL (ref 3.5–5.2)
ALP SERPL-CCNC: 63 UNIT/L (ref 40–150)
ALT SERPL W/O P-5'-P-CCNC: 47 UNIT/L (ref 10–44)
ANION GAP (OHS): 6 MMOL/L (ref 8–16)
AST SERPL-CCNC: 55 UNIT/L (ref 11–45)
BACTERIA UR CULT: ABNORMAL
BASOPHILS # BLD AUTO: 0.03 K/UL
BASOPHILS NFR BLD AUTO: 0.3 %
BILIRUB SERPL-MCNC: 0.3 MG/DL (ref 0.1–1)
BUN SERPL-MCNC: 15 MG/DL (ref 8–23)
CALCIUM SERPL-MCNC: 8.4 MG/DL (ref 8.7–10.5)
CHLORIDE SERPL-SCNC: 107 MMOL/L (ref 95–110)
CO2 SERPL-SCNC: 30 MMOL/L (ref 23–29)
CREAT SERPL-MCNC: 0.8 MG/DL (ref 0.5–1.4)
ERYTHROCYTE [DISTWIDTH] IN BLOOD BY AUTOMATED COUNT: 14.8 % (ref 11.5–14.5)
GFR SERPLBLD CREATININE-BSD FMLA CKD-EPI: >60 ML/MIN/1.73/M2
GLUCOSE SERPL-MCNC: 97 MG/DL (ref 70–110)
HCT VFR BLD AUTO: 26.5 % (ref 37–48.5)
HGB BLD-MCNC: 8.3 GM/DL (ref 12–16)
IMM GRANULOCYTES # BLD AUTO: 0.06 K/UL (ref 0–0.04)
IMM GRANULOCYTES NFR BLD AUTO: 0.6 % (ref 0–0.5)
LYMPHOCYTES # BLD AUTO: 1.33 K/UL (ref 1–4.8)
MAGNESIUM SERPL-MCNC: 1.7 MG/DL (ref 1.6–2.6)
MCH RBC QN AUTO: 28.8 PG (ref 27–31)
MCHC RBC AUTO-ENTMCNC: 31.3 G/DL (ref 32–36)
MCV RBC AUTO: 92 FL (ref 82–98)
NUCLEATED RBC (/100WBC) (OHS): 0 /100 WBC
PLATELET # BLD AUTO: 276 K/UL (ref 150–450)
PMV BLD AUTO: 9.8 FL (ref 9.2–12.9)
POTASSIUM SERPL-SCNC: 3.3 MMOL/L (ref 3.5–5.1)
PROT SERPL-MCNC: 5.8 GM/DL (ref 6–8.4)
RBC # BLD AUTO: 2.88 M/UL (ref 4–5.4)
RELATIVE EOSINOPHIL (OHS): 0.8 %
RELATIVE LYMPHOCYTE (OHS): 13.4 % (ref 18–48)
RELATIVE MONOCYTE (OHS): 10.4 % (ref 4–15)
RELATIVE NEUTROPHIL (OHS): 74.5 % (ref 38–73)
SODIUM SERPL-SCNC: 143 MMOL/L (ref 136–145)
WBC # BLD AUTO: 9.9 K/UL (ref 3.9–12.7)

## 2025-04-28 PROCEDURE — 85025 COMPLETE CBC W/AUTO DIFF WBC: CPT | Performed by: NURSE PRACTITIONER

## 2025-04-28 PROCEDURE — 25000003 PHARM REV CODE 250: Performed by: NURSE PRACTITIONER

## 2025-04-28 PROCEDURE — 97110 THERAPEUTIC EXERCISES: CPT

## 2025-04-28 PROCEDURE — 84520 ASSAY OF UREA NITROGEN: CPT | Performed by: STUDENT IN AN ORGANIZED HEALTH CARE EDUCATION/TRAINING PROGRAM

## 2025-04-28 PROCEDURE — 63600175 PHARM REV CODE 636 W HCPCS: Performed by: NURSE PRACTITIONER

## 2025-04-28 PROCEDURE — 99233 SBSQ HOSP IP/OBS HIGH 50: CPT | Mod: 25,,, | Performed by: INTERNAL MEDICINE

## 2025-04-28 PROCEDURE — 21400001 HC TELEMETRY ROOM

## 2025-04-28 PROCEDURE — 97530 THERAPEUTIC ACTIVITIES: CPT | Mod: CQ

## 2025-04-28 PROCEDURE — 97530 THERAPEUTIC ACTIVITIES: CPT

## 2025-04-28 PROCEDURE — 25000003 PHARM REV CODE 250: Performed by: STUDENT IN AN ORGANIZED HEALTH CARE EDUCATION/TRAINING PROGRAM

## 2025-04-28 PROCEDURE — 25000003 PHARM REV CODE 250: Performed by: HOSPITALIST

## 2025-04-28 PROCEDURE — 82435 ASSAY OF BLOOD CHLORIDE: CPT | Performed by: STUDENT IN AN ORGANIZED HEALTH CARE EDUCATION/TRAINING PROGRAM

## 2025-04-28 PROCEDURE — 83735 ASSAY OF MAGNESIUM: CPT | Performed by: NURSE PRACTITIONER

## 2025-04-28 PROCEDURE — 36415 COLL VENOUS BLD VENIPUNCTURE: CPT | Performed by: STUDENT IN AN ORGANIZED HEALTH CARE EDUCATION/TRAINING PROGRAM

## 2025-04-28 PROCEDURE — 63600175 PHARM REV CODE 636 W HCPCS: Performed by: INTERNAL MEDICINE

## 2025-04-28 RX ORDER — PANTOPRAZOLE SODIUM 40 MG/1
40 TABLET, DELAYED RELEASE ORAL DAILY
Status: DISCONTINUED | OUTPATIENT
Start: 2025-04-28 | End: 2025-04-29 | Stop reason: HOSPADM

## 2025-04-28 RX ORDER — FUROSEMIDE 20 MG/1
20 TABLET ORAL DAILY
Status: DISCONTINUED | OUTPATIENT
Start: 2025-04-29 | End: 2025-04-29 | Stop reason: HOSPADM

## 2025-04-28 RX ORDER — POTASSIUM CHLORIDE 20 MEQ/1
20 TABLET, EXTENDED RELEASE ORAL DAILY
Status: DISCONTINUED | OUTPATIENT
Start: 2025-04-28 | End: 2025-04-29 | Stop reason: HOSPADM

## 2025-04-28 RX ORDER — ENOXAPARIN SODIUM 100 MG/ML
40 INJECTION SUBCUTANEOUS EVERY 24 HOURS
Status: DISCONTINUED | OUTPATIENT
Start: 2025-04-28 | End: 2025-04-29 | Stop reason: HOSPADM

## 2025-04-28 RX ADMIN — POTASSIUM CHLORIDE 20 MEQ: 1500 TABLET, EXTENDED RELEASE ORAL at 11:04

## 2025-04-28 RX ADMIN — MEMANTINE 10 MG: 10 TABLET ORAL at 09:04

## 2025-04-28 RX ADMIN — METOPROLOL SUCCINATE 12.5 MG: 25 TABLET, EXTENDED RELEASE ORAL at 09:04

## 2025-04-28 RX ADMIN — ATORVASTATIN CALCIUM 10 MG: 10 TABLET, FILM COATED ORAL at 10:04

## 2025-04-28 RX ADMIN — CEFTRIAXONE 1 G: 1 INJECTION, POWDER, FOR SOLUTION INTRAMUSCULAR; INTRAVENOUS at 04:04

## 2025-04-28 RX ADMIN — DONEPEZIL HYDROCHLORIDE 10 MG: 5 TABLET, FILM COATED ORAL at 10:04

## 2025-04-28 RX ADMIN — CLINDAMYCIN IN 5 PERCENT DEXTROSE 600 MG: 12 INJECTION, SOLUTION INTRAVENOUS at 10:04

## 2025-04-28 RX ADMIN — ASPIRIN 81 MG: 81 TABLET, CHEWABLE ORAL at 09:04

## 2025-04-28 RX ADMIN — OLANZAPINE 5 MG: 5 TABLET, FILM COATED ORAL at 10:04

## 2025-04-28 RX ADMIN — CLINDAMYCIN IN 5 PERCENT DEXTROSE 600 MG: 12 INJECTION, SOLUTION INTRAVENOUS at 08:04

## 2025-04-28 RX ADMIN — FLUOXETINE HYDROCHLORIDE 10 MG: 10 CAPSULE ORAL at 09:04

## 2025-04-28 RX ADMIN — LOSARTAN POTASSIUM 50 MG: 50 TABLET, FILM COATED ORAL at 09:04

## 2025-04-28 RX ADMIN — MEMANTINE 10 MG: 10 TABLET ORAL at 10:04

## 2025-04-28 RX ADMIN — LOSARTAN POTASSIUM 50 MG: 50 TABLET, FILM COATED ORAL at 10:04

## 2025-04-28 RX ADMIN — ENOXAPARIN SODIUM 40 MG: 40 INJECTION SUBCUTANEOUS at 04:04

## 2025-04-28 RX ADMIN — PANTOPRAZOLE SODIUM 40 MG: 40 TABLET, DELAYED RELEASE ORAL at 09:04

## 2025-04-28 RX ADMIN — FUROSEMIDE 20 MG: 10 INJECTION, SOLUTION INTRAMUSCULAR; INTRAVENOUS at 09:04

## 2025-04-28 RX ADMIN — CLINDAMYCIN IN 5 PERCENT DEXTROSE 600 MG: 12 INJECTION, SOLUTION INTRAVENOUS at 04:04

## 2025-04-28 NOTE — PROGRESS NOTES
[facility] ([facility])  Cardiology  Progress Note    Patient Name: Leslie Wood  MRN: [id number]  Admission Date: 4/25/2025  Hospital Length of Stay: 3 days  Code Status: DNR   Attending Physician: Nikolai Cordoba MD   Primary Care Physician: Natalia Gomez MD  Expected Discharge Date:   Principal Problem:Cellulitis of left upper extremity    Subjective:     Hospital Course:   04/26/2025.    Cardiology has been consulted to evaluate the patient volume status.    Her BNP was 450 on admission baseline is normal.    Troponin is flat.    EKG no dynamic ischemia sinus bradycardia.    Daughter at bedside.    Denies dyspnea    April 27, 2025.    Complains of orthopnea today.    Lower extremity swelling.      April 28, 2025.    Looking better.  Denies orthopnea.    Leg swelling resolves.    Received 2 doses of Lasix yesterday 1 and today 1    Interval History: \    Review of Systems   Cardiovascular:  Negative for chest pain, dyspnea on exertion, palpitations and syncope.   Neurological:  Negative for focal weakness.     Objective:     Vital Signs (Most Recent):  Temp: 97.5 °F (36.4 °C) (04/28/25 1135)  Pulse: 63 (04/28/25 1135)  Resp: 15 (04/28/25 1135)  BP: (!) 124/90 (04/28/25 1135)  SpO2: 100 % (04/28/25 1135) Vital Signs (24h Range):  Temp:  [97.5 °F (36.4 °C)-98.7 °F (37.1 °C)] 97.5 °F (36.4 °C)  Pulse:  [] 63  Resp:  [12-24] 15  SpO2:  [92 %-100 %] 100 %  BP: (111-205)/() 124/90     Weight: 58.2 kg (128 lb 4.9 oz)  Body mass index is 24.24 kg/m².     SpO2: 100 %         Intake/Output Summary (Last 24 hours) at 4/28/2025 1237  Last data filed at 4/28/2025 0929  Gross per 24 hour   Intake 240 ml   Output --   Net 240 ml       Lines/Drains/Airways       Peripheral Intravenous Line  Duration                  Peripheral IV - Single Lumen 04/27/25 1600 Anterior;Distal;Left Forearm <1 day                       Physical Exam  Vitals reviewed.   Constitutional:       Appearance: She is well-developed.   Neck:       Vascular: No carotid bruit.   Cardiovascular:      Rate and Rhythm: Normal rate and regular rhythm.      Pulses: Intact distal pulses.      Heart sounds: Normal heart sounds. No murmur heard.  Pulmonary:      Breath sounds: Normal breath sounds.   Neurological:      Mental Status: She is oriented to person, place, and time.            Significant Labs:   Recent Lab Results         04/28/25  0454        Albumin 1.6       ALP 63       ALT 47       Anion Gap 6       AST 55       Baso # 0.03       Basophil % 0.3       BILIRUBIN TOTAL 0.3  Comment: For infants and newborns, interpretation of results should be based   on gestational age, weight and in agreement with clinical   observations.    Premature Infant recommended reference ranges:   0-24 hours:  <8.0 mg/dL   24-48 hours: <12.0 mg/dL   3-5 days:    <15.0 mg/dL   6-29 days:   <15.0 mg/dL       BUN 15       Calcium 8.4       Chloride 107       CO2 30       Creatinine 0.8       eGFR >60  Comment: Estimated GFR calculated using the CKD-EPI creatinine (2021) equation.       Eos # 0.08       Eos % 0.8       Glucose 97       Gran # (ANC) 7.37       Hematocrit 26.5       Hemoglobin 8.3       Immature Grans (Abs) 0.06  Comment: Mild elevation in immature granulocytes is non specific and can be seen in a variety of conditions including stress response, acute inflammation, trauma and pregnancy. Correlation with other laboratory and clinical findings is essential.       Immature Granulocytes 0.6       Lymph # 1.33       Lymph % 13.4       Magnesium  1.7       MCH 28.8       MCHC 31.3       MCV 92       Mono # 1.03       Mono % 10.4       MPV 9.8       Neut % 74.5       nRBC 0       Platelet Count 276       Potassium 3.3       PROTEIN TOTAL 5.8       RBC 2.88       RDW 14.8       Sodium 143       WBC 9.90               Significant Imaging:   Assessment and Plan:     Brief HPI:     * Cellulitis of left upper extremity  Her Hospital Medicine and Orthopedics    Chronic  congestive heart failure with left ventricular diastolic dysfunction  Patient has Diastolic (HFpEF) heart failure that is Acute on chronic. On presentation their CHF was decompensated. Evidence of decompensated CHF on presentation includes: orthopnea. The etiology of their decompensation is likely dietary indiscretion. Most recent BNP and echo results are listed below.  Recent Labs     04/25/25  1651   *       Latest ECHO  Results for orders placed during the hospital encounter of 12/15/22    Echo    Interpretation Summary  · The left ventricle is normal in size with concentric remodeling and normal systolic function.  · The estimated ejection fraction is 60%.  · Normal left ventricular diastolic function.  · Normal right ventricular size with normal right ventricular systolic function.  · Mild aortic regurgitation.  · There is moderate pulmonary hypertension.  · Normal central venous pressure (3 mmHg).  · The estimated PA systolic pressure is 52 mmHg.    Current Heart Failure Medications  hydrALAZINE injection 10 mg, Every 6 hours PRN, Intravenous  losartan tablet 50 mg, 2 times daily, Oral  metoprolol succinate (TOPROL-XL) 24 hr split tablet 12.5 mg, Daily, Oral  furosemide tablet 20 mg, Daily, Oral    Plan  - Monitor strict I&Os and daily weights.    - Place on telemetry  - Low sodium diet  - Place on fluid restriction of 1.5 L.   - Cardiology has been consulted  - The patient's volume status is worsening as indicated by edema and orthopnea. Will adjust treatment as follows:  Start IV Lasix  - mild fluid overloaded.      04/28/2025.    Status post 2 doses of Lasix so far yesterday and today.  Leg swelling results.    Looks euvolemic.    Switch to p.o. Lasix starting tomorrow.  Orders placed.        Nonischemic cardiomyopathy  Recovered.    She appears to be euvolemic on exam despite minimal lower extremity swelling which could be dependent.    Monitor intake and output.    Use Lasix IV 20 mg p.r.n. as  needed.    Chest x-ray reviewed no signs of fluid overload.    Oxygenation is good        VTE Risk Mitigation (From admission, onward)           Ordered     enoxaparin injection 40 mg  Daily         04/28/25 0442     Reason for No Pharmacological VTE Prophylaxis  Once        Comments: May require procedure   Question:  Reasons:  Answer:  Physician Provided (leave comment)    04/25/25 1951     IP VTE HIGH RISK PATIENT  Once         04/25/25 1951     Place sequential compression device  Until discontinued         04/25/25 1951                    Domingo Martins MD  Cardiology  [facility] ([facility])

## 2025-04-28 NOTE — SUBJECTIVE & OBJECTIVE
Review of Systems   All other systems reviewed and are negative.    Objective:     Vital Signs (Most Recent):  Temp: 97.7 °F (36.5 °C) (04/28/25 1548)  Pulse: 63 (04/28/25 1626)  Resp: 14 (04/28/25 1548)  BP: (!) 165/71 (04/28/25 1548)  SpO2: 97 % (04/28/25 1548) Vital Signs (24h Range):  Temp:  [97.5 °F (36.4 °C)-98.7 °F (37.1 °C)] 97.7 °F (36.5 °C)  Pulse:  [] 63  Resp:  [12-24] 14  SpO2:  [92 %-100 %] 97 %  BP: (111-205)/() 165/71     Weight: 58.2 kg (128 lb 4.9 oz)  Body mass index is 24.24 kg/m².    Intake/Output Summary (Last 24 hours) at 4/28/2025 1652  Last data filed at 4/28/2025 0929  Gross per 24 hour   Intake 240 ml   Output --   Net 240 ml         Physical Exam  Vitals and nursing note reviewed.   Constitutional:       General: She is not in acute distress.     Appearance: Normal appearance. She is normal weight.   Cardiovascular:      Rate and Rhythm: Normal rate and regular rhythm.      Heart sounds: No murmur heard.  Pulmonary:      Effort: Pulmonary effort is normal. No respiratory distress.      Breath sounds: No wheezing.   Neurological:      General: No focal deficit present.      Mental Status: She is alert and oriented to person, place, and time.   Psychiatric:         Mood and Affect: Mood normal.         Behavior: Behavior normal.               Significant Labs: All pertinent labs within the past 24 hours have been reviewed.  Recent Lab Results         04/28/25  0454        Albumin 1.6       ALP 63       ALT 47       Anion Gap 6       AST 55       Baso # 0.03       Basophil % 0.3       BILIRUBIN TOTAL 0.3  Comment: For infants and newborns, interpretation of results should be based   on gestational age, weight and in agreement with clinical   observations.    Premature Infant recommended reference ranges:   0-24 hours:  <8.0 mg/dL   24-48 hours: <12.0 mg/dL   3-5 days:    <15.0 mg/dL   6-29 days:   <15.0 mg/dL       BUN 15       Calcium 8.4       Chloride 107       CO2 30        Creatinine 0.8       eGFR >60  Comment: Estimated GFR calculated using the CKD-EPI creatinine (2021) equation.       Eos # 0.08       Eos % 0.8       Glucose 97       Gran # (ANC) 7.37       Hematocrit 26.5       Hemoglobin 8.3       Immature Grans (Abs) 0.06  Comment: Mild elevation in immature granulocytes is non specific and can be seen in a variety of conditions including stress response, acute inflammation, trauma and pregnancy. Correlation with other laboratory and clinical findings is essential.       Immature Granulocytes 0.6       Lymph # 1.33       Lymph % 13.4       Magnesium  1.7       MCH 28.8       MCHC 31.3       MCV 92       Mono # 1.03       Mono % 10.4       MPV 9.8       Neut % 74.5       nRBC 0       Platelet Count 276       Potassium 3.3       PROTEIN TOTAL 5.8       RBC 2.88       RDW 14.8       Sodium 143       WBC 9.90               Significant Imaging: I have reviewed all pertinent imaging results/findings within the past 24 hours.    X-Ray Chest 1 View   Final Result      1.  Overall, no significant interval change has occurred.         Finalized on: 4/27/2025 10:00 PM By:  Shar Sanz MD   [facility]# [id number]      2025-04-27 22:03:02.135     [facility]      US Abdomen Limited   Final Result    No acute findings.            Finalized on: 4/25/2025 8:12 PM By:  Travis Villagomez   [facility]# [id number]      2025-04-25 20:14:20.755     [facility]      X-Ray Hand 3 View Left   Final Result    As above      Finalized on: 4/25/2025 5:23 PM By:  Daniel Penn MD   [facility]# [id number]      2025-04-25 17:25:11.954     [facility]      X-Ray Wrist Complete Left   Final Result    As above      Finalized on: 4/25/2025 5:22 PM By:  Daniel Penn MD   [facility]# [id number]      2025-04-25 17:24:11.876     [facility]      X-Ray Chest 1 View   Final Result      1.  Negative for acute process involving the chest.      2.  Stable findings as noted above.         Electronically signed by: Shar Sanz MD   Date:    04/25/2025   Time:    18:34

## 2025-04-28 NOTE — CONSULTS
SW confirmed pt is a resident at [facility]. Referral provided via [facility] with SNF recommendations.  SW to follow

## 2025-04-28 NOTE — ASSESSMENT & PLAN NOTE
Most recent hemoglobin and hematocrit are listed below.  Recent Labs     04/27/25  0427 04/28/25  0454   HGB 8.6* 8.3*   HCT 27.9* 26.5*     Plan  - Monitor serial CBC: Daily  - Transfuse PRBC if patient becomes hemodynamically unstable, symptomatic or H/H drops below 7/21.  - Patient has not received any PRBC transfusions to date  - Patient's anemia is currently stable

## 2025-04-28 NOTE — PLAN OF CARE
A245/A245 MELANIE  Leslie Wood is a 84 y.o.female admitted on 4/25/2025 for Cellulitis of left upper extremity   Code Status: DNR MRN: [id number]   Review of patient's allergies indicates:   Allergen Reactions    Amitriptyline     Hydrochlorothiazide      Causes muscle cramping    Lisinopril      hyperkalemia    Opioids - morphine analogues Hallucinations     Chest pain and hallucinations    Oxycodone     Percocet [oxycodone-acetaminophen] Other (See Comments)     Seizures    Belbuca [buprenorphine hcl] Nausea And Vomiting and Other (See Comments)     Black out     Codeine Nausea Only and Rash    Prazosin Other (See Comments)     dizziness     Past Medical History:   Diagnosis Date    Alzheimer's disease, unspecified (CODE)     Arthritis     Cataract     Dementia without behavioral disturbance     GERD (gastroesophageal reflux disease)     Heart attack 05/23/2022    Hypertension     Stroke       PRN meds    albuterol-ipratropium, 3 mL, Q4H PRN  dextrose 50%, 12.5 g, PRN  dextrose 50%, 25 g, PRN  glucagon (human recombinant), 1 mg, PRN  glucose, 16 g, PRN  glucose, 24 g, PRN  hydrALAZINE, 10 mg, Q6H PRN  naloxone, 0.02 mg, PRN  ondansetron, 4 mg, Q8H PRN  polyethylene glycol, 17 g, Daily PRN  sodium chloride 0.9%, 3 mL, Q8H PRN      Chart check completed. Will continue plan of care.         [place] Coma Scale Score: 15     Lead Monitored: Lead II Rhythm: normal sinus rhythm    Cardiac/Telemetry Box Number: [id number]  VTE Core Measure: Pharmacological prophylaxis initiated/maintained Last Bowel Movement: 04/28/25  Diet Consistent Carbohydrate 2000 Calories (up to 75 gm per meal)  Voiding Characteristics: incontinence  Oliver Score: 23  Fall Risk Score: 25  Accucheck []   Freq?      Lines/Drains/Airways       Peripheral Intravenous Line  Duration                  Peripheral IV - Single Lumen 04/27/25 1600 Anterior;Distal;Left Forearm 1 day

## 2025-04-28 NOTE — ASSESSMENT & PLAN NOTE
Patient has Diastolic (HFpEF) heart failure that is Acute on chronic. On presentation their CHF was decompensated. Evidence of decompensated CHF on presentation includes: orthopnea. The etiology of their decompensation is likely dietary indiscretion. Most recent BNP and echo results are listed below.  Recent Labs     04/25/25  1651   *       Latest ECHO  Results for orders placed during the hospital encounter of 12/15/22    Echo    Interpretation Summary  · The left ventricle is normal in size with concentric remodeling and normal systolic function.  · The estimated ejection fraction is 60%.  · Normal left ventricular diastolic function.  · Normal right ventricular size with normal right ventricular systolic function.  · Mild aortic regurgitation.  · There is moderate pulmonary hypertension.  · Normal central venous pressure (3 mmHg).  · The estimated PA systolic pressure is 52 mmHg.    Current Heart Failure Medications  hydrALAZINE injection 10 mg, Every 6 hours PRN, Intravenous  losartan tablet 50 mg, 2 times daily, Oral  metoprolol succinate (TOPROL-XL) 24 hr split tablet 12.5 mg, Daily, Oral  furosemide tablet 20 mg, Daily, Oral    Plan  - Monitor strict I&Os and daily weights.    - Place on telemetry  - Low sodium diet  - Place on fluid restriction of 1.5 L.   - Cardiology has been consulted  - The patient's volume status is worsening as indicated by edema and orthopnea. Will adjust treatment as follows:  Start IV Lasix  - mild fluid overloaded.      04/28/2025.    Status post 2 doses of Lasix so far yesterday and today.  Leg swelling results.    Looks euvolemic.    Switch to p.o. Lasix starting tomorrow.  Orders placed.

## 2025-04-28 NOTE — PROGRESS NOTES
Pt hypertensive to 200s, audibly wheezing. Given IVP Hydral with minimal improvement in BP.   Pt examined with daughter at bedside, she reports she has been more short of breath since earlier this afternoon. Denies CP, cough. Wheezing slightly improved after duonebs but pt remains slightly tachypneic, Sats 98% on 3L NC O2.     Will obtain CXR  Will give IV lasix 40 mg x 1   Cont to monitor

## 2025-04-28 NOTE — PLAN OF CARE
A245/A245 MELANIE  Leslie Wood is a 84 y.o.female admitted on 4/25/2025 for Cellulitis of left upper extremity   Code Status: DNR MRN: [id number]   Review of patient's allergies indicates:   Allergen Reactions    Amitriptyline     Hydrochlorothiazide      Causes muscle cramping    Lisinopril      hyperkalemia    Opioids - morphine analogues Hallucinations     Chest pain and hallucinations    Oxycodone     Percocet [oxycodone-acetaminophen] Other (See Comments)     Seizures    Belbuca [buprenorphine hcl] Nausea And Vomiting and Other (See Comments)     Black out     Codeine Nausea Only and Rash    Prazosin Other (See Comments)     dizziness     Past Medical History:   Diagnosis Date    Alzheimer's disease, unspecified (CODE)     Arthritis     Cataract     Dementia without behavioral disturbance     GERD (gastroesophageal reflux disease)     Heart attack 05/23/2022    Hypertension     Stroke       PRN meds    albuterol-ipratropium, 3 mL, Q4H PRN  dextrose 50%, 12.5 g, PRN  dextrose 50%, 25 g, PRN  glucagon (human recombinant), 1 mg, PRN  glucose, 16 g, PRN  glucose, 24 g, PRN  hydrALAZINE, 10 mg, Q6H PRN  naloxone, 0.02 mg, PRN  ondansetron, 4 mg, Q8H PRN  polyethylene glycol, 17 g, Daily PRN  sodium chloride 0.9%, 3 mL, Q8H PRN      Chart check completed. Will continue plan of care.         [place] Coma Scale Score: 15     Lead Monitored: Lead II Rhythm: normal sinus rhythm    Cardiac/Telemetry Box Number: [id number]  VTE Core Measure: Pharmacological prophylaxis initiated/maintained Last Bowel Movement: 04/28/25  Diet Consistent Carbohydrate 2000 Calories (up to 75 gm per meal)  Voiding Characteristics: incontinence  Oliver Score: 23  Fall Risk Score: 25  Accucheck []   Freq?      Lines/Drains/Airways       Peripheral Intravenous Line  Duration                  Peripheral IV - Single Lumen 04/27/25 1600 Anterior;Distal;Left Forearm 1 day

## 2025-04-28 NOTE — PT/OT/SLP PROGRESS
Physical Therapy  Treatment    Leslie Wood   MRN: [id number]   Admitting Diagnosis: Cellulitis of left upper extremity    PT Received On: 04/28/25  PT Start Time: 0919     PT Stop Time: 0949    PT Total Time (min): 30 min       Billable Minutes:  Therapeutic Activity 30    Treatment Type: Treatment  PT/PTA: PTA     Number of PTA visits since last PT visit: 2       General Precautions: Standard, fall  Orthopedic Precautions: N/A  Braces: N/A  Respiratory Status: Nasal cannula, flow 3 L/min    Spiritual, Cultural Beliefs, Buddhism Practices, Values that Affect Care: no    Subjective:  Communicated with patient's nurse Jaziel and conducted Epic chart review prior to session.  Patient agreeable to participate with therapy.     Pain/Comfort  Pain Rating 1: 0/10    Objective:   Patient found with: bed alarm, oxygen, peripheral IV    Functional Mobility:  Bed Mobility:    Total A sup-sit to EOB and with return to supine position    Transfers:   Total A x 2 lateral squat pivot towards HOB (2 attempts, gait belt and knees blocked)    Gait:    NT, unable to perform at this time    Balance:   SBA, with interventional moderate assistance to correct poor posture       Treatment and Education:  Educated patient on importance of increased tolerance to upright position and direct impact on CV endurance and strength. Patient encouraged to utilize elevated HOB to simulate chair position until able to safely complete chair T/F. Patient given a minimum goal of majority of the day to be spent in upright, especially with all meals. Encouraged patient to perform AROM TE to BLE throughout the day within all available planes of motion. Re enforced importance of utilizing call light to meet needs in room and not attempt to get up without staff assistance. Patient verbalized understanding and agreed to comply.  Seated therex: 10 x TKE, Hip Flex, AP     AM-PAC 6 CLICK MOBILITY  How much help from another person does this patient currently  need?   1 = Unable, Total/Dependent Assistance  2 = A lot, Maximum/Moderate Assistance  3 = A little, Minimum/Contact Guard/Supervision  4 = None, Modified [place]/Independent    Turning over in bed (including adjusting bedclothes, sheets and blankets)?: 1  Sitting down on and standing up from a chair with arms (e.g., wheelchair, bedside commode, etc.): 1  Moving from lying on back to sitting on the side of the bed?: 1  Moving to and from a bed to a chair (including a wheelchair)?: 1  Need to walk in hospital room?: 1  Climbing 3-5 steps with a railing?: 1  Basic Mobility Total Score: 6    AM-PAC Raw Score CMS G-Code Modifier Level of Impairment Assistance   6 % Total / Unable   7 - 9 CM 80 - 100% Maximal Assist   10 - 14 CL 60 - 80% Moderate Assist   15 - 19 CK 40 - 60% Moderate Assist   20 - 22 CJ 20 - 40% Minimal Assist   23 CI 1-20% SBA / CGA   24 CH 0% Independent/ Mod I     Patient left with bed in chair position with all lines intact, call button in reach, bed alarm on, and nurse notified.    Assessment:  Leslie Wood is a 84 y.o. female with a medical diagnosis of Cellulitis of left upper extremity and presents with overall decline in functional mobility. Patient would continue to benefit from skilled PT to address functional limitations listed below in order to return to PLOF/decrease caregiver burden.    Patient severely lacking functional mobility at this time. With attempts to roll and have her use her left arm to push into seated position, she was unable to perform said movement. There was faint attempts by patient, just ultimately too weak and lacking the ROM to perform movements needed to get to EOB. Once seated EOB, poor ROM was more evident with exercises as she struggled to go into full extension with TKE and flexion with arm raises. Standing was performed with knees blocked and ultimately a total squat pivot transfer to HOB.    Rehab identified problem list/impairments: weakness,  impaired endurance, impaired self care skills, impaired functional mobility, gait instability, impaired balance, decreased upper extremity function, decreased lower extremity function, decreased safety awareness, impaired coordination, decreased ROM, impaired cardiopulmonary response to activity    Rehab potential is fair.    Activity tolerance: Fair    Discharge recommendations: Moderate Intensity Therapy      Barriers to discharge:      Equipment recommendations: none     GOALS:   Multidisciplinary Problems       Physical Therapy Goals          Problem: Physical Therapy    Goal Priority Disciplines Outcome Interventions   Physical Therapy Goal     PT, PT/OT     Description: Pt will perform bed mobility with mod A in order to participate in EOB activity.  Pt will perform transfers with mod A in order to participate in OOB activity.  Pt will ambulate 50 ft mod A with LRAD in order to participate in daily tasks.   Pt will improve functional mobility as evidenced by 2 pt improvement in AMPAC score.                       DME Justifications:  No DME recommended requiring DME justifications    PLAN:    Patient to be seen 3 x/week to address the above listed problems via therapeutic activities, therapeutic exercises, gait training  Plan of Care expires: 05/10/25  Plan of Care reviewed with: patient         04/28/2025

## 2025-04-28 NOTE — PT/OT/SLP PROGRESS
Occupational Therapy   Treatment    Name: Leslie Wood  MRN: [id number]  Admitting Diagnosis:  Cellulitis of left upper extremity       Recommendations:     Discharge Recommendations: Moderate Intensity Therapy  Discharge Equipment Recommendations:  none  Barriers to discharge:  None    Assessment:     Leslie Wood is a 84 y.o. female with a medical diagnosis of Cellulitis of left upper extremity.  She presents with the following performance deficits affecting function are weakness, impaired endurance, impaired self care skills, impaired functional mobility, impaired balance, pain, gait instability, impaired cardiopulmonary response to activity, decreased safety awareness, decreased lower extremity function, decreased upper extremity function, decreased coordination, impaired cognition.     Rehab Prognosis:  Fair; patient would benefit from acute skilled OT services to address these deficits and reach maximum level of function.       Plan:     Patient to be seen 2 x/week to address the above listed problems via self-care/home management, therapeutic activities, therapeutic exercises, wheelchair management/training  Plan of Care Expires: 05/10/25  Plan of Care Reviewed with: patient    Subjective     Chief Complaint: I am trying to pick at this food  Patient/Family Comments/goals: to be able to do like I was prior  Pain/Comfort:  Pain Rating 1: 0/10    Objective:     Communicated with: nursekitty, prior to session.  Patient found supine with bed alarm, oxygen, peripheral IV upon OT entry to room.    General Precautions: Standard, fall    Orthopedic Precautions:N/A  Braces: N/A  Respiratory Status: Nasal cannula, flow 3 L/min     Occupational Performance:     Bed Mobility:    Patient completed Rolling/Turning to Left with  total assistance and 2 persons  Patient completed Rolling/Turning to Right with total assistance and 2 persons  Patient completed Scooting/Bridging with total assistance and 2 persons  Pt req  total x2 for semi squat/lat scoot for improving position towards HOB   Patient completed Supine to Sit with total assistance and 2 persons  SBA with constant cues for pulling to decrease post lean x10 mins  BUE to assist with balance   Severe misaligned posture in sitting   Patient completed Sit to Supine with total assistance and 2 persons   Pt very rigid with all movements   Decreased ability to follow commands req cues to initiate/sustain following tasks         Activities of Daily Living:  Grooming: minimum assistance to comb hair       AMPAC 6 Click ADL: 8    Treatment & Education:  Blister noted when roling to lower left glute/back of thigh, nursing reported. Pt rogerio sitting EOB and completed [place] to initiate all TE reviewed, limited by range of motion and weakness in  proximal shoulders.   OT role, plan of care, progression of goals, importance of continued OOB activity, ADL/functional transfer and mobility retraining, call don't fall, safety precautions, fall prevention.      Patient left HOB elevated with all lines intact and call button in reach    GOALS:   Multidisciplinary Problems       Occupational Therapy Goals          Problem: Occupational Therapy    Goal Priority Disciplines Outcome Interventions   Occupational Therapy Goal     OT, PT/OT Progressing    Description: Goals to be met by: 5/10/25     Patient will increase functional independence with ADLs by performing:    Feeding with Minimal Assistance.  Grooming while supine with Minimal Assistance.                         DME Justifications:  No DME recommended requiring DME justifications    Time Tracking:     OT Date of Treatment: 04/28/25  OT Start Time: 0915  OT Stop Time: 0945  OT Total Time (min): 30 min    Billable Minutes:Therapeutic Activity 15  Therapeutic Exercise 15  HETAL Khan  A client care conference was performed between the OTR/L and FONG, prior to treatment by FONG, to discuss the patient's status, treatment plan and  established goals.      OT/RAJWINDER: RAJWINDER     Number of RAJWINDER visits since last OT visit: 1    4/28/2025

## 2025-04-28 NOTE — CONSULTS
[facility] - Telemetry ([facility]  Wound Care    Patient Name:  Leslie Wood   MRN:  [id number]  Date: 4/28/2025  Diagnosis: Cellulitis of left upper extremity    History:     Past Medical History:   Diagnosis Date    Alzheimer's disease, unspecified (CODE)     Arthritis     Cataract     Dementia without behavioral disturbance     GERD (gastroesophageal reflux disease)     Heart attack 05/23/2022    Hypertension     Stroke        Social History[1]    Precautions:     Allergies as of 04/25/2025 - Reviewed 04/25/2025   Allergen Reaction Noted    Amitriptyline  05/09/2013    Hydrochlorothiazide  01/30/2018    Lisinopril  02/26/2019    Opioids - morphine analogues Hallucinations 04/16/2024    Oxycodone  11/16/2022    Percocet [oxycodone-acetaminophen] Other (See Comments) 08/14/2013    Belbuca [buprenorphine hcl] Nausea And Vomiting and Other (See Comments) 10/19/2022    Codeine Nausea Only and Rash 07/23/2012    Prazosin Other (See Comments) 03/04/2021       WOC Assessment Details/Treatment         Consulted to evaluate wounds to sacrum, R posterior thigh and L heel.  Chart reviewed.  Ms. Wood is an 85 yo female patient admitted 4/25/25 with cellulitis of her L upper extremity, as well as acute cystitis w/ hematuria.    PMH includes:  dementia, anxiety, depression, hypertension, nonischemic cardiomyopathy, CKD stage IIIA, and stroke with left hemiparesis.      This morning, patient is resting quietly in bed.  She is awake and alert, just finishing her breakfast meal.  Denies any complaints @ this time.  Assisted to reposition onto her R side; removed adult brief and cleansed skin to B buttocks using soft bath cloths due to loose brown stool noted.      --Noted POA Stage 3 PI to sacrum.  Full thickness wound w/ majority pale red/pink wound base; minimal yellow fibrin noted.  Periwound skin is intact but dark pink---this does miguel.  Minimal serous exudate noted.  --Noted POA Stage 2 PI to R thigh (from medial to  posterior aspect).  The medial aspect is in a linear pattern---skin intact but slightly raised/blistered.  The posterior aspect is a small bullous lesion; noted blister has ruptured w/ minimal serosanguineous exudate draining.  Periwound intact.  Recommend:  Triad hydrophilic paste to be applied daily after bath & prn each episode of kenya-care performed.  Removed adult brief; feel best option for containing incontinence is with use of linen pad only---no adult brief.  Ordered low air-loss pump to be applied to current pressure redistribution mattress.  Pressure injury prevention measures ordered.    --Noted POA deep tissue PI to L heel; was able to remove dry, flaking crust to reveal intact but maroon and non-blanchable skin.  No exudate noted.  Evaluated skin to R heel; no redness or skin breakdown @ this time.  Recommend:  applied Cavilon skin barrier and allowed to dry.  Floated B heels using pillows for now; ordered offloading heel boots to be applied @ all times when patient is in bed.      Plan f/u later in the week.       04/28/25 1025   WOCN Assessment   WOCN Total Time (mins) 45   Visit Date 04/28/25   Visit Time 1025   Consult Type New   WOCN Speciality Wound   Wound pressure   Number of Wounds 3   Intervention assessed;changed;chart review;coordination of care   Teaching on-going   Skin Interventions   Device Skin Pressure Protection absorbent pad utilized/changed   Pressure Reduction Devices pressure-redistributing mattress utilized   Pressure Reduction Techniques weight shift assistance provided   Skin Protection incontinence pads utilized   Positioning   Body Position turned;head facing, left   Head of Bed (HOB) Positioning HOB elevated   Positioning/Transfer Devices wedge;pillows;in use   Pressure Injury Prevention    Check Moisture Management Pad Done   Sacral Foam Dressing Patient incontinent, barrier cream applied   Heel protection technique Pillow   Check Medical Devices Done        Wound 04/25/25  1950 Pressure Injury Left upper Heel   Date First Assessed/Time First Assessed: 04/25/25 1950   Present on Original Admission: Yes  Primary Wound Type: Pressure Injury  Side: Left  Orientation: upper  Location: Heel   Wound Image    Pressure Injury Stage DTPI   Dressing Appearance Open to air   Drainage Amount None   Appearance Intact;Maroon   Periwound Area Intact;Dry   Care Cleansed with:;Antimicrobial agent   Periwound Care Skin barrier film applied        Wound 04/25/25 1950 Pressure Injury Right posterior;medial Thigh   Date First Assessed/Time First Assessed: 04/25/25 1950   Present on Original Admission: Yes  Primary Wound Type: Pressure Injury  Side: Right  Orientation: posterior;medial  Location: Thigh   Wound Image    Pressure Injury Stage 2   Dressing Appearance Open to air   Drainage Amount Scant   Drainage Characteristics/Odor Serosanguineous   Appearance Red  (blister cap ruptured)   Tissue loss description Partial thickness   Red (%), Wound Tissue Color 100 %   Periwound Area Intact;Redness   Care Cleansed with:;Antimicrobial agent   Dressing Applied  (Triad hydrophilic paste)        Wound 04/25/25 Pressure Injury Sacral spine   Date First Assessed: 04/25/25   Present on Original Admission: Yes  Primary Wound Type: Pressure Injury  Location: Sacral spine   Wound Image    Pressure Injury Stage 3   Dressing Appearance Open to air   Drainage Amount Scant   Drainage Characteristics/Odor Serous   Appearance Red;Pink;Not granulating;Yellow;Fibrin   Tissue loss description Full thickness   Red (%), Wound Tissue Color 85 %   Yellow (%), Wound Tissue Color 15 %   Periwound Area Intact;Redness  (blanches)   Wound Edges Defined   Wound Length (cm) 1.1 cm   Wound Width (cm) 0.7 cm   Wound Depth (cm) 0.2 cm   Wound Volume (cm^3) 0.081 cm^3   Wound Surface Area (cm^2) 0.6 cm^2   Care Cleansed with:;Antimicrobial agent   Dressing Applied  (Triad hydrophilic paste)         04/28/2025         [1]   Social  History  Socioeconomic History    Marital status:    Tobacco Use    Smoking status: Never    Smokeless tobacco: Never   Substance and Sexual Activity    Alcohol use: No    Drug use: No    Sexual activity: Not Currently     Social Drivers of Health     Financial Resource Strain: Patient Unable To Answer (4/26/2025)    Overall Financial Resource Strain (CARDIA)     Difficulty of Paying Living Expenses: Patient unable to answer   Food Insecurity: Patient Unable To Answer (4/26/2025)    Hunger Vital Sign     Worried About Running Out of Food in the Last Year: Patient unable to answer     Ran Out of Food in the Last Year: Patient unable to answer   Transportation Needs: Patient Unable To Answer (4/26/2025)    PRAPARE - Transportation     Lack of Transportation (Medical): Patient unable to answer     Lack of Transportation (Non-Medical): Patient unable to answer   Physical Activity: Insufficiently Active (6/4/2024)    Exercise Vital Sign     Days of Exercise per Week: 6 days     Minutes of Exercise per Session: 20 min   Stress: Patient Unable To Answer (4/26/2025)    [employer] - Occupational Stress Questionnaire     Feeling of Stress : Patient unable to answer   Housing Stability: Patient Unable To Answer (4/26/2025)    Housing Stability Vital Sign     Unable to Pay for Housing in the Last Year: Patient unable to answer     Homeless in the Last Year: Patient unable to answer      Cigarettes

## 2025-04-28 NOTE — SUBJECTIVE & OBJECTIVE
Interval History: \    Review of Systems   Cardiovascular:  Negative for chest pain, dyspnea on exertion, palpitations and syncope.   Neurological:  Negative for focal weakness.     Objective:     Vital Signs (Most Recent):  Temp: 97.5 °F (36.4 °C) (04/28/25 1135)  Pulse: 63 (04/28/25 1135)  Resp: 15 (04/28/25 1135)  BP: (!) 124/90 (04/28/25 1135)  SpO2: 100 % (04/28/25 1135) Vital Signs (24h Range):  Temp:  [97.5 °F (36.4 °C)-98.7 °F (37.1 °C)] 97.5 °F (36.4 °C)  Pulse:  [] 63  Resp:  [12-24] 15  SpO2:  [92 %-100 %] 100 %  BP: (111-205)/() 124/90     Weight: 58.2 kg (128 lb 4.9 oz)  Body mass index is 24.24 kg/m².     SpO2: 100 %         Intake/Output Summary (Last 24 hours) at 4/28/2025 1237  Last data filed at 4/28/2025 0929  Gross per 24 hour   Intake 240 ml   Output --   Net 240 ml       Lines/Drains/Airways       Peripheral Intravenous Line  Duration                  Peripheral IV - Single Lumen 04/27/25 1600 Anterior;Distal;Left Forearm <1 day                       Physical Exam  Vitals reviewed.   Constitutional:       Appearance: She is well-developed.   Neck:      Vascular: No carotid bruit.   Cardiovascular:      Rate and Rhythm: Normal rate and regular rhythm.      Pulses: Intact distal pulses.      Heart sounds: Normal heart sounds. No murmur heard.  Pulmonary:      Breath sounds: Normal breath sounds.   Neurological:      Mental Status: She is oriented to person, place, and time.            Significant Labs:   Recent Lab Results         04/28/25  0454        Albumin 1.6       ALP 63       ALT 47       Anion Gap 6       AST 55       Baso # 0.03       Basophil % 0.3       BILIRUBIN TOTAL 0.3  Comment: For infants and newborns, interpretation of results should be based   on gestational age, weight and in agreement with clinical   observations.    Premature Infant recommended reference ranges:   0-24 hours:  <8.0 mg/dL   24-48 hours: <12.0 mg/dL   3-5 days:    <15.0 mg/dL   6-29 days:   <15.0  mg/dL       BUN 15       Calcium 8.4       Chloride 107       CO2 30       Creatinine 0.8       eGFR >60  Comment: Estimated GFR calculated using the CKD-EPI creatinine (2021) equation.       Eos # 0.08       Eos % 0.8       Glucose 97       Gran # (ANC) 7.37       Hematocrit 26.5       Hemoglobin 8.3       Immature Grans (Abs) 0.06  Comment: Mild elevation in immature granulocytes is non specific and can be seen in a variety of conditions including stress response, acute inflammation, trauma and pregnancy. Correlation with other laboratory and clinical findings is essential.       Immature Granulocytes 0.6       Lymph # 1.33       Lymph % 13.4       Magnesium  1.7       MCH 28.8       MCHC 31.3       MCV 92       Mono # 1.03       Mono % 10.4       MPV 9.8       Neut % 74.5       nRBC 0       Platelet Count 276       Potassium 3.3       PROTEIN TOTAL 5.8       RBC 2.88       RDW 14.8       Sodium 143       WBC 9.90               Significant Imaging:

## 2025-04-29 VITALS
TEMPERATURE: 98 F | WEIGHT: 128.31 LBS | HEIGHT: 61 IN | DIASTOLIC BLOOD PRESSURE: 66 MMHG | BODY MASS INDEX: 24.22 KG/M2 | HEART RATE: 67 BPM | RESPIRATION RATE: 16 BRPM | SYSTOLIC BLOOD PRESSURE: 148 MMHG | OXYGEN SATURATION: 95 %

## 2025-04-29 PROBLEM — E87.6 HYPOKALEMIA: Status: RESOLVED | Noted: 2025-04-25 | Resolved: 2025-04-29

## 2025-04-29 PROBLEM — R79.89 ELEVATED LFTS: Status: RESOLVED | Noted: 2025-04-25 | Resolved: 2025-04-29

## 2025-04-29 PROBLEM — N30.01 ACUTE CYSTITIS WITH HEMATURIA: Status: RESOLVED | Noted: 2025-04-25 | Resolved: 2025-04-29

## 2025-04-29 PROBLEM — L03.114 CELLULITIS OF LEFT UPPER EXTREMITY: Status: RESOLVED | Noted: 2025-04-25 | Resolved: 2025-04-29

## 2025-04-29 PROBLEM — E83.39 HYPOPHOSPHATEMIA: Status: RESOLVED | Noted: 2025-04-28 | Resolved: 2025-04-29

## 2025-04-29 PROBLEM — R00.1 BRADYCARDIA: Status: RESOLVED | Noted: 2022-12-15 | Resolved: 2025-04-29

## 2025-04-29 LAB
ALBUMIN SERPL BCP-MCNC: 1.6 G/DL (ref 3.5–5.2)
ALP SERPL-CCNC: 72 UNIT/L (ref 40–150)
ALT SERPL W/O P-5'-P-CCNC: 44 UNIT/L (ref 10–44)
ANION GAP (OHS): 7 MMOL/L (ref 8–16)
AST SERPL-CCNC: 44 UNIT/L (ref 11–45)
BILIRUB SERPL-MCNC: 0.2 MG/DL (ref 0.1–1)
BUN SERPL-MCNC: 18 MG/DL (ref 8–23)
CALCIUM SERPL-MCNC: 8.3 MG/DL (ref 8.7–10.5)
CHLORIDE SERPL-SCNC: 107 MMOL/L (ref 95–110)
CO2 SERPL-SCNC: 31 MMOL/L (ref 23–29)
CREAT SERPL-MCNC: 0.8 MG/DL (ref 0.5–1.4)
GFR SERPLBLD CREATININE-BSD FMLA CKD-EPI: >60 ML/MIN/1.73/M2
GLUCOSE SERPL-MCNC: 97 MG/DL (ref 70–110)
POTASSIUM SERPL-SCNC: 3.3 MMOL/L (ref 3.5–5.1)
PROT SERPL-MCNC: 5.8 GM/DL (ref 6–8.4)
SODIUM SERPL-SCNC: 145 MMOL/L (ref 136–145)

## 2025-04-29 PROCEDURE — 36415 COLL VENOUS BLD VENIPUNCTURE: CPT | Performed by: STUDENT IN AN ORGANIZED HEALTH CARE EDUCATION/TRAINING PROGRAM

## 2025-04-29 PROCEDURE — 25000003 PHARM REV CODE 250: Performed by: INTERNAL MEDICINE

## 2025-04-29 PROCEDURE — 25000003 PHARM REV CODE 250: Performed by: HOSPITALIST

## 2025-04-29 PROCEDURE — 97530 THERAPEUTIC ACTIVITIES: CPT

## 2025-04-29 PROCEDURE — 94761 N-INVAS EAR/PLS OXIMETRY MLT: CPT

## 2025-04-29 PROCEDURE — 25000003 PHARM REV CODE 250: Performed by: STUDENT IN AN ORGANIZED HEALTH CARE EDUCATION/TRAINING PROGRAM

## 2025-04-29 PROCEDURE — 25000003 PHARM REV CODE 250: Performed by: NURSE PRACTITIONER

## 2025-04-29 PROCEDURE — 27000221 HC OXYGEN, UP TO 24 HOURS

## 2025-04-29 PROCEDURE — 99900035 HC TECH TIME PER 15 MIN (STAT)

## 2025-04-29 PROCEDURE — 80053 COMPREHEN METABOLIC PANEL: CPT | Performed by: STUDENT IN AN ORGANIZED HEALTH CARE EDUCATION/TRAINING PROGRAM

## 2025-04-29 PROCEDURE — 97535 SELF CARE MNGMENT TRAINING: CPT

## 2025-04-29 PROCEDURE — 63600175 PHARM REV CODE 636 W HCPCS: Performed by: NURSE PRACTITIONER

## 2025-04-29 RX ORDER — METOPROLOL SUCCINATE 25 MG/1
12.5 TABLET, EXTENDED RELEASE ORAL DAILY
Qty: 45 TABLET | Refills: 3 | Status: SHIPPED | OUTPATIENT
Start: 2025-04-30 | End: 2026-04-30

## 2025-04-29 RX ORDER — CEPHALEXIN 500 MG/1
500 CAPSULE ORAL EVERY 12 HOURS
Qty: 10 CAPSULE | Refills: 0 | Status: SHIPPED | OUTPATIENT
Start: 2025-04-29 | End: 2025-05-04

## 2025-04-29 RX ADMIN — ASPIRIN 81 MG: 81 TABLET, CHEWABLE ORAL at 09:04

## 2025-04-29 RX ADMIN — MEMANTINE 10 MG: 10 TABLET ORAL at 09:04

## 2025-04-29 RX ADMIN — CLINDAMYCIN IN 5 PERCENT DEXTROSE 600 MG: 12 INJECTION, SOLUTION INTRAVENOUS at 06:04

## 2025-04-29 RX ADMIN — LOSARTAN POTASSIUM 50 MG: 50 TABLET, FILM COATED ORAL at 09:04

## 2025-04-29 RX ADMIN — FLUOXETINE HYDROCHLORIDE 10 MG: 10 CAPSULE ORAL at 09:04

## 2025-04-29 RX ADMIN — CEFTRIAXONE 1 G: 1 INJECTION, POWDER, FOR SOLUTION INTRAMUSCULAR; INTRAVENOUS at 02:04

## 2025-04-29 RX ADMIN — FUROSEMIDE 20 MG: 20 TABLET ORAL at 09:04

## 2025-04-29 RX ADMIN — PANTOPRAZOLE SODIUM 40 MG: 40 TABLET, DELAYED RELEASE ORAL at 09:04

## 2025-04-29 RX ADMIN — POTASSIUM CHLORIDE 20 MEQ: 1500 TABLET, EXTENDED RELEASE ORAL at 09:04

## 2025-04-29 RX ADMIN — METOPROLOL SUCCINATE 12.5 MG: 25 TABLET, EXTENDED RELEASE ORAL at 09:04

## 2025-04-29 NOTE — ASSESSMENT & PLAN NOTE
"Patient has Diastolic (HFpEF) heart failure that is Acute on chronic. On presentation their CHF was decompensated. Evidence of decompensated CHF on presentation includes: orthopnea. The etiology of their decompensation is likely increased fluid intake. Most recent BNP and echo results are listed below.  No results for input(s): "BNP" in the last 72 hours.    Latest ECHO  Results for orders placed during the hospital encounter of 12/15/22    Echo    Interpretation Summary  · The left ventricle is normal in size with concentric remodeling and normal systolic function.  · The estimated ejection fraction is 60%.  · Normal left ventricular diastolic function.  · Normal right ventricular size with normal right ventricular systolic function.  · Mild aortic regurgitation.  · There is moderate pulmonary hypertension.  · Normal central venous pressure (3 mmHg).  · The estimated PA systolic pressure is 52 mmHg.    Current Heart Failure Medications  hydrALAZINE injection 10 mg, Every 6 hours PRN, Intravenous  losartan tablet 50 mg, 2 times daily, Oral  metoprolol succinate (TOPROL-XL) 24 hr split tablet 12.5 mg, Daily, Oral  furosemide tablet 20 mg, Daily, Oral    Plan  - Monitor strict I&Os and daily weights.    - Place on telemetry  - Low sodium diet  - Place on fluid restriction of 1.5 L.   - Cardiology has been consulted  - The patient's volume status is treated with IV Lasix x 1 dose      "

## 2025-04-29 NOTE — ASSESSMENT & PLAN NOTE
Patient's blood pressure range in the last 24 hours was: BP  Min: 124/90  Max: 173/72.The patient's inpatient anti-hypertensive regimen is listed below:  Current Antihypertensives  hydrALAZINE injection 10 mg, Every 6 hours PRN, Intravenous  losartan tablet 50 mg, 2 times daily, Oral  metoprolol succinate (TOPROL-XL) 24 hr split tablet 12.5 mg, Daily, Oral  furosemide tablet 20 mg, Daily, Oral    Plan  - BP was elevated and treated with IV labetalol per ED  - Losartan resumed   - Metoprolol held due to recent bradycardia- may resume at lower dose   -Hydralazine PRN

## 2025-04-29 NOTE — PLAN OF CARE
Tele monitor removed and brought to monitor tech.  IV d/c'd with tip intact, pressure dressing applied.  Pt leaving on stretcher with EMS to return to [facility].    Problem: Adult Inpatient Plan of Care  Goal: Plan of Care Review  Outcome: Met  Goal: Patient-Specific Goal (Individualized)  Outcome: Met  Goal: Absence of Hospital-Acquired Illness or Injury  Outcome: Met  Goal: Optimal Comfort and Wellbeing  Outcome: Met  Goal: Readiness for Transition of Care  Outcome: Met     Problem: Infection  Goal: Absence of Infection Signs and Symptoms  Outcome: Met     Problem: Skin Injury Risk Increased  Goal: Skin Health and Integrity  Outcome: Met     Problem: Wound  Goal: Optimal Coping  Outcome: Met  Goal: Optimal Functional Ability  Outcome: Met  Goal: Absence of Infection Signs and Symptoms  Outcome: Met  Goal: Improved Oral Intake  Outcome: Met  Goal: Optimal Pain Control and Function  Outcome: Met  Goal: Skin Health and Integrity  Outcome: Met  Goal: Optimal Wound Healing  Outcome: Met     Problem: Fall Injury Risk  Goal: Absence of Fall and Fall-Related Injury  Outcome: Met     Problem: Fatigue  Goal: Improved Activity Tolerance  Outcome: Met     Problem: Self-Care Deficit  Goal: Improved Ability to Complete Activities of Daily Living  Outcome: Met

## 2025-04-29 NOTE — PROGRESS NOTES
"[facility] Medicine  Progress Note    Patient Name: Leslie Wood  MRN: [id number]  Patient Class: IP- Inpatient   Admission Date: 4/25/2025  Length of Stay: 4 days  Attending Physician: Nikolai Cordoba MD  Primary Care Provider: Natalia Gomez MD        Subjective     Principal Problem:Cellulitis of left upper extremity        HPI:  Patient is 84-year-old female with past medical history significant for dementia, anxiety, depression, hypertension, nonischemic cardiomyopathy, CKD stage IIIA, stroke with left hemiparesis, family reports previous episodes of cellulitis, presented to ED from [facility] for evaluation of left hand/wrist erythema/swelling. She has severe dementia and can answer some yes/no questions but is confused at baseline. No family present during my exam, so information obtained via electronic medical records/ED notes. She currently has no complaints, answers "no" to every question I asked her. on arrival to ED, temp 100.8°, heart rate 82, respirations 20, blood pressure 210/82, 98% SpO2 on room air.   Lab workup showed WBC 14.58, hemoglobin 11.0, hematocrit 35.2, platelets 308, sed rate greater than 120, , uric acid 5.7, potassium 3.4, BUN 18, creatinine 0.9, glucose 192, magnesium 2.0, alk-phos 123, albumin 2.1, total bilirubin 0.4, , , , troponin 0.040, lactic acid 1.6, procalcitonin 0.22, urinalysis with positive nitrite, 3+ leukocyte esterase, greater than 100 WBC, many bacteria.  Blood cultures x2 are pending.  Urine culture is pending.  Chest x-ray clear.  X-ray of left hand and left wrist completed with no evident fractures, does show diffuse soft tissue swelling and osteopenia/osteoarthritis with degenerative changes.  EKG shows normal sinus rhythm, heart rate 76, no acute ST or T-wave abnormalities noted.  She answers no when asked if having chest pain or shortness on breath.  Appears comfortable during exam.  While in ED " she was given acetaminophen 1000 mg, normal saline 1 L fluid bolus, IV Zosyn, IV labetalol 10 mg, DuoNeb treatment, and colchicine 1.2 mg p.o..  Blood pressure has improved and most recent is 117/58.  Heart rate around 60.  Temperature improved to 97.6.  Hospital Medicine was consulted for admission due to cellulitis and urinary tract infection.    Overview/Hospital Course:  Pt admitted to Hospital Medicine for medical management of suspected cellulitis of left hand/wrist in the setting of UTI, Hypertensive Urgency, and dementia. ESR/CRP elevated. Uric acid normal. Imaging supports moderate severe osteoarthritis at the first carpal metacarpal joint.  Mild osteoarthritis radiocarpal joint.  Osteopenia and diffuse soft tissue swelling.  CXRY and US abdomen showed no acute findings. IV antibiotics and IVF initiated. Orthopedic Surgery consulted and evaluation completed with no surgical intervention required at this time. Bradycardia (HR 40's) noted to monitor - Cardiology consulted. Of note, pt received Labetalol in ED for BP control. EKG obtained. On 4/27/2025, K replaced, AST normalized, and ALT trending down. Bradycardia resolved. Cardiology following with Lasix x 1 dose given. R hand improved with full ROM demonstrated and no pain reported. Orthopedic Surgery to sign off.  IV antibiotics transitioned to Clindamycin and Rocephin.     4/28/25  NAEON, patient resting in bed, stable on 2L NC  Continue ceftriaxone for LUE cellulitis  Continue IV lasix for CHF exacerbation  K 3.3, replete and monitor      Review of Systems   All other systems reviewed and are negative.    Objective:     Vital Signs (Most Recent):  Temp: 97.7 °F (36.5 °C) (04/28/25 1548)  Pulse: 63 (04/28/25 1626)  Resp: 14 (04/28/25 1548)  BP: (!) 165/71 (04/28/25 1548)  SpO2: 97 % (04/28/25 1548) Vital Signs (24h Range):  Temp:  [97.5 °F (36.4 °C)-98.7 °F (37.1 °C)] 97.7 °F (36.5 °C)  Pulse:  [] 63  Resp:  [12-24] 14  SpO2:  [92 %-100 %] 97 %  BP:  (111-205)/() 165/71     Weight: 58.2 kg (128 lb 4.9 oz)  Body mass index is 24.24 kg/m².    Intake/Output Summary (Last 24 hours) at 4/28/2025 1652  Last data filed at 4/28/2025 0929  Gross per 24 hour   Intake 240 ml   Output --   Net 240 ml         Physical Exam  Vitals and nursing note reviewed.   Constitutional:       General: She is not in acute distress.     Appearance: Normal appearance. She is normal weight.   Cardiovascular:      Rate and Rhythm: Normal rate and regular rhythm.      Heart sounds: No murmur heard.  Pulmonary:      Effort: Pulmonary effort is normal. No respiratory distress.      Breath sounds: No wheezing.   Neurological:      General: No focal deficit present.      Mental Status: She is alert and oriented to person, place, and time.   Psychiatric:         Mood and Affect: Mood normal.         Behavior: Behavior normal.               Significant Labs: All pertinent labs within the past 24 hours have been reviewed.  Recent Lab Results         04/28/25  0454        Albumin 1.6       ALP 63       ALT 47       Anion Gap 6       AST 55       Baso # 0.03       Basophil % 0.3       BILIRUBIN TOTAL 0.3  Comment: For infants and newborns, interpretation of results should be based   on gestational age, weight and in agreement with clinical   observations.    Premature Infant recommended reference ranges:   0-24 hours:  <8.0 mg/dL   24-48 hours: <12.0 mg/dL   3-5 days:    <15.0 mg/dL   6-29 days:   <15.0 mg/dL       BUN 15       Calcium 8.4       Chloride 107       CO2 30       Creatinine 0.8       eGFR >60  Comment: Estimated GFR calculated using the CKD-EPI creatinine (2021) equation.       Eos # 0.08       Eos % 0.8       Glucose 97       Gran # (ANC) 7.37       Hematocrit 26.5       Hemoglobin 8.3       Immature Grans (Abs) 0.06  Comment: Mild elevation in immature granulocytes is non specific and can be seen in a variety of conditions including stress response, acute inflammation, trauma and  pregnancy. Correlation with other laboratory and clinical findings is essential.       Immature Granulocytes 0.6       Lymph # 1.33       Lymph % 13.4       Magnesium  1.7       MCH 28.8       MCHC 31.3       MCV 92       Mono # 1.03       Mono % 10.4       MPV 9.8       Neut % 74.5       nRBC 0       Platelet Count 276       Potassium 3.3       PROTEIN TOTAL 5.8       RBC 2.88       RDW 14.8       Sodium 143       WBC 9.90               Significant Imaging: I have reviewed all pertinent imaging results/findings within the past 24 hours.    X-Ray Chest 1 View   Final Result      1.  Overall, no significant interval change has occurred.         Finalized on: 4/27/2025 10:00 PM By:  Shar Sanz MD   [facility]# [id number]      2025-04-27 22:03:02.135     [facility]      US Abdomen Limited   Final Result    No acute findings.            Finalized on: 4/25/2025 8:12 PM By:  Travis Villagomez   [facility]# [id number]      2025-04-25 20:14:20.755     [facility]      X-Ray Hand 3 View Left   Final Result    As above      Finalized on: 4/25/2025 5:23 PM By:  Daniel Penn MD   [facility]# [id number]      2025-04-25 17:25:11.954     [facility]      X-Ray Wrist Complete Left   Final Result    As above      Finalized on: 4/25/2025 5:22 PM By:  Daniel Penn MD   [facility]# [id number]      2025-04-25 17:24:11.876     [facility]      X-Ray Chest 1 View   Final Result      1.  Negative for acute process involving the chest.      2.  Stable findings as noted above.         Electronically signed by: Shar Sanz MD   Date:    04/25/2025   Time:    18:34              Assessment & Plan  Cellulitis of left upper extremity  Treating with Zosyn and Vancomycin (sed rate >120, , lactic acid/procalcitonin normal)  Also given a dose of colchicine per ED, uric acid 5.7 > 4.5  Prednisone 50 mg in ED  Gentle hydration with IV fluids- stopped   -Full ROM with no pain appreciated upon exam   orthopedic surgery consulted with no surgical intervention at this time- will sign  off      Acute cystitis with hematuria  Follow up urine culture reports  Currently on Vancomycin and Zosyn stopped   -Clindamycin and Rocephin initiated    Anxiety and depression  Continue home medication--fluoxetine      Hemiplegia and hemiparesis following cerebral infarction affecting left non-dominant side  Supportive care  Fall precautions  PT/OT- moderate intensity therapy recommended- pt is NH resident- CM consulted to assist with discharge planning     Nonischemic cardiomyopathy  Monitor fluid volume status closely- IV Lasix   Per records -- history of Takotsubo cardiomyopathy (apical ballooning syndrome)  No echocardiogram results found for the past 12 months  Echo from 2022  The left ventricle is normal in size with concentric remodeling and normal systolic function.  The estimated ejection fraction is 60%.  Normal left ventricular diastolic function.  Normal right ventricular size with normal right ventricular systolic function.  Mild aortic regurgitation.  There is moderate pulmonary hypertension.  Normal central venous pressure (3 mmHg).  The estimated PA systolic pressure is 52 mmHg.   -Cardiology following   Moderate vascular dementia with anxiety  Requires nursing home care   Continue nightly olanzapine and daily donepezil  Stage 3a chronic kidney disease  Creatine stable for now. BMP reviewed- noted Estimated Creatinine Clearance: 41.4 mL/min (based on SCr of 0.8 mg/dL). according to latest data. Based on current GFR, CKD stage is stage 3 - GFR 30-59.  Monitor UOP and serial BMP and adjust therapy as needed. Renally dose meds. Avoid nephrotoxic medications and procedures.  Essential hypertension  Patient's blood pressure range in the last 24 hours was: BP  Min: 124/90  Max: 173/72.The patient's inpatient anti-hypertensive regimen is listed below:  Current Antihypertensives  hydrALAZINE injection 10 mg, Every 6 hours PRN, Intravenous  losartan tablet 50 mg, 2 times daily, Oral  metoprolol succinate  "(TOPROL-XL) 24 hr split tablet 12.5 mg, Daily, Oral  furosemide tablet 20 mg, Daily, Oral    Plan  - BP was elevated and treated with IV labetalol per ED  - Losartan resumed   - Metoprolol held due to recent bradycardia- may resume at lower dose   -Hydralazine PRN   Hypokalemia  Patient's most recent potassium results are listed below.   Recent Labs     04/27/25  0427 04/28/25  0454 04/29/25  0528   K 3.1* 3.3* 3.3*     Plan  - Replete potassium per protocol  - Monitor potassium Daily    Elevated LFTs  US of abdomen showed no acute findings -lipoma present  Monitor labs- AST/>76/104>68- AST normalized/ALT 51  Avoid hepatotoxins  No tenderness on exam    Bradycardia  -resolved  -HR 80   -patient on Metoprolol - will resume at decreased dose     Chronic congestive heart failure with left ventricular diastolic dysfunction  Patient has Diastolic (HFpEF) heart failure that is Acute on chronic. On presentation their CHF was decompensated. Evidence of decompensated CHF on presentation includes: orthopnea. The etiology of their decompensation is likely increased fluid intake. Most recent BNP and echo results are listed below.  No results for input(s): "BNP" in the last 72 hours.    Latest ECHO  Results for orders placed during the hospital encounter of 12/15/22    Echo    Interpretation Summary  · The left ventricle is normal in size with concentric remodeling and normal systolic function.  · The estimated ejection fraction is 60%.  · Normal left ventricular diastolic function.  · Normal right ventricular size with normal right ventricular systolic function.  · Mild aortic regurgitation.  · There is moderate pulmonary hypertension.  · Normal central venous pressure (3 mmHg).  · The estimated PA systolic pressure is 52 mmHg.    Current Heart Failure Medications  hydrALAZINE injection 10 mg, Every 6 hours PRN, Intravenous  losartan tablet 50 mg, 2 times daily, Oral  metoprolol succinate (TOPROL-XL) 24 hr split tablet " 12.5 mg, Daily, Oral  furosemide tablet 20 mg, Daily, Oral    Plan  - Monitor strict I&Os and daily weights.    - Place on telemetry  - Low sodium diet  - Place on fluid restriction of 1.5 L.   - Cardiology has been consulted  - The patient's volume status is  treated with IV Lasix x 1 dose      Anemia  Most recent hemoglobin and hematocrit are listed below.  Recent Labs     04/27/25 0427 04/28/25  0454   HGB 8.6* 8.3*   HCT 27.9* 26.5*     Plan  - Monitor serial CBC: Daily  - Transfuse PRBC if patient becomes hemodynamically unstable, symptomatic or H/H drops below 7/21.  - Patient has not received any PRBC transfusions to date  - Patient's anemia is currently stable    Hypophosphatemia  Patient's most recent phosphorus results are listed below.   Recent Labs     04/27/25 0427   PHOS 2.6*       VTE Risk Mitigation (From admission, onward)           Ordered     enoxaparin injection 40 mg  Daily         04/28/25 0442     Reason for No Pharmacological VTE Prophylaxis  Once        Comments: May require procedure   Question:  Reasons:  Answer:  Physician Provided (leave comment)    04/25/25 1951     IP VTE HIGH RISK PATIENT  Once         04/25/25 1951     Place sequential compression device  Until discontinued         04/25/25 1951                    Discharge Planning   JOSE:      Code Status: DNR   Medical Readiness for Discharge Date:   Discharge Plan A: Return to nursing home                Please place Justification for DME        Nikolai Cordoba MD  Department of Hospital Medicine   [facility] - Telemetry ([facility])     patient

## 2025-04-29 NOTE — ASSESSMENT & PLAN NOTE
Patient's most recent potassium results are listed below.   Recent Labs     04/27/25  0427 04/28/25  0454 04/29/25  0528   K 3.1* 3.3* 3.3*     Plan  - Replete potassium per protocol  - Monitor potassium Daily

## 2025-04-29 NOTE — PT/OT/SLP PROGRESS
Occupational Therapy   Treatment    Name: Leslie Wood  MRN: [id number]  Admitting Diagnosis:  Cellulitis of left upper extremity       Recommendations:     Discharge Recommendations: Moderate Intensity Therapy  Discharge Equipment Recommendations:  to be determined by next level of care  Barriers to discharge:  None    Assessment:     Leslie Wood is a 84 y.o. female with a medical diagnosis of Cellulitis of left upper extremity.  She presents with the following performance deficits affecting function are weakness, impaired functional mobility, decreased safety awareness, impaired coordination, decreased coordination, gait instability, impaired endurance, impaired self care skills, visual deficits, decreased upper extremity function, decreased lower extremity function, decreased ROM, impaired skin.     Rehab Prognosis:  Fair; patient would benefit from acute skilled OT services to address these deficits and reach maximum level of function.       Plan:     Patient to be seen 2 x/week to address the above listed problems via self-care/home management, therapeutic activities, therapeutic exercises, wheelchair management/training  Plan of Care Expires: 05/10/25  Plan of Care Reviewed with: patient    Subjective     Chief Complaint: I have trouble sitting up  Patient/Family Comments/goals: to get stronger and go back where I was   Pain/Comfort:  Pain Rating 1: 0/10    Objective:     Communicated with: nursebonifacio, prior to session.  Patient found left sidelying with bed alarm, peripheral IV, oxygen upon OT entry to room.    General Precautions: Standard, fall    Orthopedic Precautions:N/A  Braces: N/A  Respiratory Status: Nasal cannula, flow 2 L/min     Occupational Performance:     Bed Mobility:    Patient completed Rolling/Turning to Left with  maximal assistance and 2 persons  For wedge postioning   Patient completed Scooting/Bridging with maximal assistance and 2 persons  Lateral scoot/squat towards HOB   Sup  scoot for repositioning   Patient completed Supine to Sit with maximal assistance and 2 persons  Increased post lean and postural imbalance   Req mod assist, then positioned in supported sitting with wedge and pillows while completing grooming EOB   X10 mins with max cues for postural corrections   Patient completed Sit to Supine with maximal assistance and 2 persons     Activities of Daily Living:  Grooming: pt completed grooming while seated EOB to improve functional alignment for grooming and self care task. Pt engaged in oral hygiene with min assist but positioned on wedge/pillows EOB. Pt completed hair brushing with moderate assist to complete thoroughly         AMPAC 6 Click ADL: 8    Treatment & Education:  Pt rogerio tx well, and would continue to benefit from skilled tx to improve postural awareness when sitting EOB.  OT role, plan of care, progression of goals, importance of continued OOB activity, ADL/functional transfer and mobility retraining, call don't fall, safety precautions, fall prevention.      Patient left left sidelying with all lines intact and call button in reach    GOALS:   Multidisciplinary Problems       Occupational Therapy Goals          Problem: Occupational Therapy    Goal Priority Disciplines Outcome Interventions   Occupational Therapy Goal     OT, PT/OT Progressing    Description: Goals to be met by: 5/10/25     Patient will increase functional independence with ADLs by performing:    Feeding with Minimal Assistance.  Grooming while supine with Minimal Assistance.                         DME Justifications:  No DME recommended requiring DME justifications    Time Tracking:     OT Date of Treatment: 04/29/25  OT Start Time: 1050  OT Stop Time: 1115  OT Total Time (min): 25 min    Billable Minutes:Self Care/Home Management 15  Therapeutic Activity 10  HETAL Khan      OT/RAJWINDER: RAJWINDER     Number of RAJWINDER visits since last OT visit: 2    4/29/2025

## 2025-04-29 NOTE — DISCHARGE SUMMARY
"[facility] - Tele[facility] ([facility] Medicine  Discharge Summary      Patient Name: Leslie Wood  MRN: [id number]  BECKY: [id number]  Patient Class: IP- Inpatient  Admission Date: 4/25/2025  Hospital Length of Stay: 4 days  Discharge Date and Time: No discharge date for patient encounter.  Attending Physician: Nikolai Cordoba MD   Discharging Provider: Nikolai Cordoba MD  Primary Care Provider: Natalia Gomez MD    Primary Care Team: Networked reference to record PCT     HPI:   Patient is 84-year-old female with past medical history significant for dementia, anxiety, depression, hypertension, nonischemic cardiomyopathy, CKD stage IIIA, stroke with left hemiparesis, family reports previous episodes of cellulitis, presented to ED from [facility] for evaluation of left hand/wrist erythema/swelling. She has severe dementia and can answer some yes/no questions but is confused at baseline. No family present during my exam, so information obtained via electronic medical records/ED notes. She currently has no complaints, answers "no" to every question I asked her. on arrival to ED, temp 100.8°, heart rate 82, respirations 20, blood pressure 210/82, 98% SpO2 on room air.   Lab workup showed WBC 14.58, hemoglobin 11.0, hematocrit 35.2, platelets 308, sed rate greater than 120, , uric acid 5.7, potassium 3.4, BUN 18, creatinine 0.9, glucose 192, magnesium 2.0, alk-phos 123, albumin 2.1, total bilirubin 0.4, , , , troponin 0.040, lactic acid 1.6, procalcitonin 0.22, urinalysis with positive nitrite, 3+ leukocyte esterase, greater than 100 WBC, many bacteria.  Blood cultures x2 are pending.  Urine culture is pending.  Chest x-ray clear.  X-ray of left hand and left wrist completed with no evident fractures, does show diffuse soft tissue swelling and osteopenia/osteoarthritis with degenerative changes.  EKG shows normal sinus rhythm, heart rate 76, no acute ST or T-wave abnormalities noted.  " She answers no when asked if having chest pain or shortness on breath.  Appears comfortable during exam.  While in ED she was given acetaminophen 1000 mg, normal saline 1 L fluid bolus, IV Zosyn, IV labetalol 10 mg, DuoNeb treatment, and colchicine 1.2 mg p.o..  Blood pressure has improved and most recent is 117/58.  Heart rate around 60.  Temperature improved to 97.6.  Hospital Medicine was consulted for admission due to cellulitis and urinary tract infection.    Procedure(s) (LRB):  REMOVAL, FOREIGN BODY, UPPER EXTREMITY (Left)      Hospital Course:   Pt admitted to Hospital Medicine for medical management of suspected cellulitis of left hand/wrist in the setting of UTI, Hypertensive Urgency, and dementia. ESR/CRP elevated. Uric acid normal. Imaging supports moderate severe osteoarthritis at the first carpal metacarpal joint.  Mild osteoarthritis radiocarpal joint.  Osteopenia and diffuse soft tissue swelling.  CXRY and US abdomen showed no acute findings. IV antibiotics and IVF initiated. Orthopedic Surgery consulted and evaluation completed with no surgical intervention required at this time. Bradycardia (HR 40's) noted to monitor - Cardiology consulted. Of note, pt received Labetalol in ED for BP control. EKG obtained. On 4/27/2025, K replaced, AST normalized, and ALT trending down. Bradycardia resolved. Cardiology following with Lasix x 1 dose given. R hand improved with full ROM demonstrated and no pain reported. Orthopedic Surgery to sign off.  IV antibiotics transitioned to Clindamycin and Rocephin.     4/28/25  NAEON, patient resting in bed, stable on 2L NC  Continue ceftriaxone for LUE cellulitis  Continue IV lasix for CHF exacerbation  K 3.3, replete and monitor    4/29/25  NAEON, patient sitting in bed, awake, alert, pleasently confused  Urine culture +klebsiella, pan-sensitive  LUE cellulitis improved along with swelling  Transitioned to PO antibiotics for cellulitis and UTI treatment  Stable for  discharge to SNF  F/u with PCP in 2-3 weeks for further management     Goals of Care Treatment Preferences:  Code Status: DNR    Health care agent: see file  Health care agent number: see file          What is most important right now is to focus on comfort and QOL .         SDOH Screening:  The patient was unable to be screened for utility difficulties, food insecurity, transport difficulties, housing insecurity, and interpersonal safety, so no concerns could be identified this admission.     Consults:   Consults (From admission, onward)          Status Ordering Provider     Inpatient consult to Social Work/Case Management  Once        Provider:  (Not yet assigned)    Completed ROSALBA WESTFALL.     Inpatient consult to Cardiology  Once        Provider:  Domingo Martins MD    Completed ROSALBA WESTFALL     Inpatient consult to Orthopedics  Once        Provider:  Masood Barragan Jr., MD    Completed [facility]            Assessment & Plan  Anxiety and depression  Continue home medication--fluoxetine      Hemiplegia and hemiparesis following cerebral infarction affecting left non-dominant side  Supportive care  Fall precautions  PT/OT- moderate intensity therapy recommended- pt is NH resident- CM consulted to assist with discharge planning     Nonischemic cardiomyopathy  Monitor fluid volume status closely- IV Lasix   Per records -- history of Takotsubo cardiomyopathy (apical ballooning syndrome)  No echocardiogram results found for the past 12 months  Echo from 2022  The left ventricle is normal in size with concentric remodeling and normal systolic function.  The estimated ejection fraction is 60%.  Normal left ventricular diastolic function.  Normal right ventricular size with normal right ventricular systolic function.  Mild aortic regurgitation.  There is moderate pulmonary hypertension.  Normal central venous pressure (3 mmHg).  The estimated PA systolic pressure is 52 mmHg.   -Cardiology following   Moderate  "vascular dementia with anxiety  Requires nursing home care   Continue nightly olanzapine and daily donepezil  Stage 3a chronic kidney disease  Creatine stable for now. BMP reviewed- noted Estimated Creatinine Clearance: 41.4 mL/min (based on SCr of 0.8 mg/dL). according to latest data. Based on current GFR, CKD stage is stage 3 - GFR 30-59.  Monitor UOP and serial BMP and adjust therapy as needed. Renally dose meds. Avoid nephrotoxic medications and procedures.  Essential hypertension  Patient's blood pressure range in the last 24 hours was: BP  Min: 133/63  Max: 173/72.The patient's inpatient anti-hypertensive regimen is listed below:  Current Antihypertensives  hydrALAZINE injection 10 mg, Every 6 hours PRN, Intravenous  losartan tablet 50 mg, 2 times daily, Oral  metoprolol succinate (TOPROL-XL) 24 hr split tablet 12.5 mg, Daily, Oral  furosemide tablet 20 mg, Daily, Oral  metoprolol succinate (TOPROL-XL) 24 hr tablet, Daily, Oral    Plan  - BP was elevated and treated with IV labetalol per ED  - Losartan resumed   - Metoprolol held due to recent bradycardia- may resume at lower dose   -Hydralazine PRN   Chronic congestive heart failure with left ventricular diastolic dysfunction  Patient has Diastolic (HFpEF) heart failure that is Acute on chronic. On presentation their CHF was decompensated. Evidence of decompensated CHF on presentation includes: orthopnea. The etiology of their decompensation is likely increased fluid intake. Most recent BNP and echo results are listed below.  No results for input(s): "BNP" in the last 72 hours.    Latest ECHO  Results for orders placed during the hospital encounter of 12/15/22    Echo    Interpretation Summary  · The left ventricle is normal in size with concentric remodeling and normal systolic function.  · The estimated ejection fraction is 60%.  · Normal left ventricular diastolic function.  · Normal right ventricular size with normal right ventricular systolic " function.  · Mild aortic regurgitation.  · There is moderate pulmonary hypertension.  · Normal central venous pressure (3 mmHg).  · The estimated PA systolic pressure is 52 mmHg.    Current Heart Failure Medications  hydrALAZINE injection 10 mg, Every 6 hours PRN, Intravenous  losartan tablet 50 mg, 2 times daily, Oral  metoprolol succinate (TOPROL-XL) 24 hr split tablet 12.5 mg, Daily, Oral  furosemide tablet 20 mg, Daily, Oral  metoprolol succinate (TOPROL-XL) 24 hr tablet, Daily, Oral    Plan  - Monitor strict I&Os and daily weights.    - Place on telemetry  - Low sodium diet  - Place on fluid restriction of 1.5 L.   - Cardiology has been consulted  - The patient's volume status is  treated with IV Lasix x 1 dose      Anemia  Most recent hemoglobin and hematocrit are listed below.  Recent Labs     04/27/25  0427 04/28/25  0454   HGB 8.6* 8.3*   HCT 27.9* 26.5*     Plan  - Monitor serial CBC: Daily  - Transfuse PRBC if patient becomes hemodynamically unstable, symptomatic or H/H drops below 7/21.  - Patient has not received any PRBC transfusions to date  - Patient's anemia is currently stable    Final Active Diagnoses:    Diagnosis Date Noted POA    Nonischemic cardiomyopathy [I42.8]  Yes    Chronic congestive heart failure with left ventricular diastolic dysfunction [I50.32] 04/27/2025 No    Stage 3a chronic kidney disease [N18.31] 08/05/2024 Yes    Anemia [D64.9] 04/28/2025 Yes    Moderate vascular dementia with anxiety [F01.B4] 06/05/2024 Yes    Anxiety and depression [F41.9, F32.A] 09/06/2016 Yes    Hemiplegia and hemiparesis following cerebral infarction affecting left non-dominant side [I69.354] 03/04/2021 Not Applicable    Essential hypertension [I10] 12/12/2024 Yes      Problems Resolved During this Admission:    Diagnosis Date Noted Date Resolved POA    PRINCIPAL PROBLEM:  Cellulitis of left upper extremity [L03.114] 04/25/2025 04/29/2025 Yes    Acute cystitis with hematuria [N30.01] 04/25/2025 04/29/2025  Yes    Hypokalemia [E87.6] 04/25/2025 04/29/2025 Yes    Bradycardia [R00.1] 12/15/2022 04/29/2025 Yes    Elevated LFTs [R79.89] 04/25/2025 04/29/2025 Yes    Hypophosphatemia [E83.39] 04/28/2025 04/29/2025 Yes       Discharged Condition: stable    Disposition: long-term Nursing Facili*    Follow Up:   Follow-up Information       Natalia Gomez MD. Schedule an appointment as soon as possible for a visit in 2 week(s).    Specialty: Family Medicine  Why: Hospital discharge follow up  Contact information:  [address] [facility] [address] [phone]                           Patient Instructions:      Ambulatory referral/consult to Outpatient Case Management   Referral Priority: Routine Referral Type: Consultation   Referral Reason: Specialty Services Required   Number of Visits Requested: 1     ACCEPT - Ambulatory referral/consult to [facility]   Standing Status: Future   Referral Priority: Routine Referral Type: Consultation   Referral Reason: Specialty Services Required   Requested Specialty: Cardiology   Number of Visits Requested: 1       Significant Diagnostic Studies: Labs: All labs within the past 24 hours have been reviewed    Pending Diagnostic Studies:       None           Medications:  Reconciled Home Medications:      Medication List        START taking these medications      cephALEXin 500 MG capsule  Commonly known as: KEFLEX  Take 1 capsule (500 mg total) by mouth every 12 (twelve) hours. for 5 days            CHANGE how you take these medications      FLUoxetine 10 MG capsule  Take 10 mg by mouth once daily.  What changed: Another medication with the same name was removed. Continue taking this medication, and follow the directions you see here.     metoprolol succinate 25 MG 24 hr tablet  Commonly known as: TOPROL-XL  Take 0.5 tablets (12.5 mg total) by mouth once daily.  Start taking on: April 30, 2025  What changed: how much to take            CONTINUE  taking these medications      aspirin 81 MG Chew  Take 81 mg by mouth once daily.     atorvastatin 10 MG tablet  Commonly known as: LIPITOR  Take 1 tablet (10 mg total) by mouth every evening.     b complex vitamins capsule  Take 1 capsule by mouth once daily.     cyanocobalamin 1000 MCG tablet  Commonly known as: VITAMIN B-12  Take 1 tablet (1,000 mcg total) by mouth once daily.     donepeziL 10 MG tablet  Commonly known as: ARICEPT  TAKE 1 TABLET BY MOUTH EVERY EVENING     furosemide 20 MG tablet  Commonly known as: LASIX  Take 1 tablet (20 mg total) by mouth once daily.     losartan 50 MG tablet  Commonly known as: COZAAR  Take 1 tablet (50 mg total) by mouth 2 (two) times daily.     multivitamin Tab  Take 1 tablet by mouth once daily.     OLANZapine 5 MG tablet  Commonly known as: ZyPREXA  Take 1 tablet (5 mg total) by mouth every evening.            ASK your doctor about these medications      memantine 10 MG Tab  Commonly known as: NAMENDA  TAKE 1 TABLET BY MOUTH TWICE DAILY              Indwelling Lines/Drains at time of discharge:   Lines/Drains/Airways       None                   Time spent on the discharge of patient: 45 minutes         Nikolai Cordoba MD  Department of Hospital Medicine  '[facility] - Tele[facility] ([facility])

## 2025-04-29 NOTE — ASSESSMENT & PLAN NOTE
Creatine stable for now. BMP reviewed- noted Estimated Creatinine Clearance: 41.4 mL/min (based on SCr of 0.8 mg/dL). according to latest data. Based on current GFR, CKD stage is stage 3 - GFR 30-59.  Monitor UOP and serial BMP and adjust therapy as needed. Renally dose meds. Avoid nephrotoxic medications and procedures.

## 2025-04-29 NOTE — PT/OT/SLP PROGRESS
Physical Therapy Treatment    Patient Name:  Leslie Wood   MRN:  [id number]    Recommendations:     Discharge Recommendations: Moderate Intensity Therapy  Discharge Equipment Recommendations: to be determined by next level of care  Barriers to discharge: None    Assessment:     Leslie Wood is a 84 y.o. female admitted with a medical diagnosis of Cellulitis of left upper extremity.  She presents with the following impairments/functional limitations: weakness, impaired endurance, impaired functional mobility, gait instability, impaired balance, pain, decreased safety awareness, decreased lower extremity function, decreased ROM, impaired cognition, impaired cardiopulmonary response to activity, decreased coordination.    Rehab Prognosis: Fair; patient would benefit from acute skilled PT services to address these deficits and reach maximum level of function.    Recent Surgery: Procedure(s) (LRB):  REMOVAL, FOREIGN BODY, UPPER EXTREMITY (Left)      Plan:     During this hospitalization, patient to be seen 3 x/week to address the identified rehab impairments via therapeutic activities, gait training, therapeutic exercises, neuromuscular re-education and progress toward the following goals:    Plan of Care Expires:  05/10/25    Subjective     Chief Complaint: Pt agreed to participate  Patient/Family Comments/goals: none stated  Pain/Comfort:  Pain Rating 1: 0/10    Objective:     Communicated with nurse and epic chart review prior to session. Patient found left sidelying with bed alarm, oxygen, peripheral IV, telemetry upon PT entry to room.     General Precautions: Standard, fall  Orthopedic Precautions: N/A  Braces: N/A  Respiratory Status: Nasal cannula, flow 2 L/min     Functional Mobility:  Gait Belt Applied - Yes   Socks/Shoes Donned - Yes  Bed Mobility  Rolling Left: maximal assistance  Scooting: maximal assistance  Supine to Sit: maximal assistance and of 2 persons for LE management and trunk management  Sit  "to Supine: maximal assistance and of 2 persons for LE management and trunk management  Supine Scooting: total assistance and of 2 persons  Transfers  Sit to 1/2 stand: maximal assistance and of 2 persons, x3 reps, squat pivot to L to scoot higher in bed.   Balance  Sitting: moderate assistance, MAX A to correct posterior lean  Pt sat EOB x10min. Frequent cuing for balance, pt with posterior and L lateral lean, worse with unilateral UE movement to complete ADLs EOB, cuing to keep feet on ground and anterior weight shift.     AM-PAC 6 CLICK MOBILITY  Turning over in bed (including adjusting bedclothes, sheets and blankets)?: 2  Sitting down on and standing up from a chair with arms (e.g., wheelchair, bedside commode, etc.): 1  Moving from lying on back to sitting on the side of the bed?: 2  Moving to and from a bed to a chair (including a wheelchair)?: 1  Need to walk in hospital room?: 1  Climbing 3-5 steps with a railing?: 1  Basic Mobility Total Score: 8       Treatment & Education:  Reviewed role of PT in acute care and POC. Pt tolerated interventions well. Reviewed importance of OOB activities, activity pacing, and HEP (marching/hip flex, hip abd, heel slides/LAQ, quad sets, ankle pumps) in order to maintain/regain strength. Encouraged to sit up for all meals. Encouraged frequent position changes to prevent pressure injury. Reviewed "call don't fall" policy and increased risk of falling due to weakness, instructed to utilize call bell for assistance with all transfers. Pt agreeable to all requests.    Patient left left sidelying with all lines intact, call button in reach, bed alarm on, and CM present..    GOALS:   Multidisciplinary Problems       Physical Therapy Goals          Problem: Physical Therapy    Goal Priority Disciplines Outcome Interventions   Physical Therapy Goal     PT, PT/OT Progressing    Description: Pt will perform bed mobility with mod A in order to participate in EOB activity.  Pt will " perform transfers with mod A in order to participate in OOB activity.  Pt will ambulate 50 ft mod A with LRAD in order to participate in daily tasks.   Pt will improve functional mobility as evidenced by 2 pt improvement in AMPAC score.                       Time Tracking:     PT Received On: 04/29/25  PT Start Time: 1045     PT Stop Time: 1110  PT Total Time (min): 25 min     Billable Minutes: Therapeutic Activity 25min    Treatment Type: Treatment  PT/PTA: PT     Number of PTA visits since last PT visit: 0     04/29/2025

## 2025-04-29 NOTE — PLAN OF CARE
Pt tolerated interventions well. Required MAX A of 2 for bed mobility, sat EOB x10min. Recommending moderate intensity therapy upon d/c.

## 2025-04-29 NOTE — PLAN OF CARE
A245/A245 SURESHHeron Wood is a 84 y.o.female admitted on 4/25/2025 for Cellulitis of left upper extremity   Code Status: DNR MRN: [id number]   Review of patient's allergies indicates:   Allergen Reactions    Amitriptyline     Hydrochlorothiazide      Causes muscle cramping    Lisinopril      hyperkalemia    Opioids - morphine analogues Hallucinations     Chest pain and hallucinations    Oxycodone     Percocet [oxycodone-acetaminophen] Other (See Comments)     Seizures    Belbuca [buprenorphine hcl] Nausea And Vomiting and Other (See Comments)     Black out     Codeine Nausea Only and Rash    Prazosin Other (See Comments)     dizziness     Past Medical History:   Diagnosis Date    Alzheimer's disease, unspecified (CODE)     Arthritis     Cataract     Dementia without behavioral disturbance     GERD (gastroesophageal reflux disease)     Heart attack 05/23/2022    Hypertension     Stroke       PRN meds    albuterol-ipratropium, 3 mL, Q4H PRN  dextrose 50%, 12.5 g, PRN  dextrose 50%, 25 g, PRN  glucagon (human recombinant), 1 mg, PRN  glucose, 16 g, PRN  glucose, 24 g, PRN  hydrALAZINE, 10 mg, Q6H PRN  naloxone, 0.02 mg, PRN  ondansetron, 4 mg, Q8H PRN  polyethylene glycol, 17 g, Daily PRN  sodium chloride 0.9%, 3 mL, Q8H PRN         Pt oriented x4.  VSS.  Pt remained afebrile throughout this shift.   All meds administered per order.   Pt remained free of falls this shift.   Plan of care reviewed. Patient verbalizes understanding.   Pt moving/turning independently.   Bed low, side rails up x 2, wheels locked, call light in reach.   Patient instructed to call for assistance.  Patient education provided.             Orientation: disoriented to, time, situation  [place] Coma Scale Score: 15     Lead Monitored: Lead II Rhythm: sinus bradycardia    Cardiac/Telemetry Box Number: [id number]  VTE Core Measure: Pharmacological prophylaxis initiated/maintained Last Bowel Movement: 04/28/25  Diet Consistent Carbohydrate 2000 Calories  (up to 75 gm per meal)  Voiding Characteristics: incontinence  Oliver Score: 15  Fall Risk Score: 25  Accucheck []   Freq?      Lines/Drains/Airways       Peripheral Intravenous Line  Duration                  Peripheral IV - Single Lumen 04/27/25 1600 Anterior;Distal;Left Forearm 1 day

## 2025-04-29 NOTE — ASSESSMENT & PLAN NOTE
Patient's most recent phosphorus results are listed below.   Recent Labs     04/27/25  0427   PHOS 2.6*

## 2025-04-29 NOTE — ASSESSMENT & PLAN NOTE
"Patient has Diastolic (HFpEF) heart failure that is Acute on chronic. On presentation their CHF was decompensated. Evidence of decompensated CHF on presentation includes: orthopnea. The etiology of their decompensation is likely increased fluid intake. Most recent BNP and echo results are listed below.  No results for input(s): "BNP" in the last 72 hours.    Latest ECHO  Results for orders placed during the hospital encounter of 12/15/22    Echo    Interpretation Summary  · The left ventricle is normal in size with concentric remodeling and normal systolic function.  · The estimated ejection fraction is 60%.  · Normal left ventricular diastolic function.  · Normal right ventricular size with normal right ventricular systolic function.  · Mild aortic regurgitation.  · There is moderate pulmonary hypertension.  · Normal central venous pressure (3 mmHg).  · The estimated PA systolic pressure is 52 mmHg.    Current Heart Failure Medications  hydrALAZINE injection 10 mg, Every 6 hours PRN, Intravenous  losartan tablet 50 mg, 2 times daily, Oral  metoprolol succinate (TOPROL-XL) 24 hr split tablet 12.5 mg, Daily, Oral  furosemide tablet 20 mg, Daily, Oral  metoprolol succinate (TOPROL-XL) 24 hr tablet, Daily, Oral    Plan  - Monitor strict I&Os and daily weights.    - Place on telemetry  - Low sodium diet  - Place on fluid restriction of 1.5 L.   - Cardiology has been consulted  - The patient's volume status is treated with IV Lasix x 1 dose      "

## 2025-04-29 NOTE — PLAN OF CARE
[facility] - Tele[facility] (Hospital)  Discharge Final Note    Primary Care Provider: Natalia Gomez MD    Expected Discharge Date: 4/29/2025    Final Discharge Note (most recent)       Final Note - 04/29/25 1325          Final Note    Assessment Type Final Discharge Note     Anticipated Discharge Disposition senior care Nursing Facility     Hospital Resources/Appts/Education Provided Post-Acute resouces added to AVS        Post-Acute Status    Discharge Delays None known at this time                   Pt to discharge to [facility] today; SNF recs provided to facility. AVS updated.   Nursing provided with number for report and ambulance transport arranged by NH.     Important Message from Medicare  Important Message from Medicare regarding Discharge Appeal Rights: Given to patient/caregiver, Explained to patient/caregiver, Signed/date by patient/caregiver     Date IMM was signed: 04/29/25  Time IMM was signed: 1050     Follow-up providers       Natalia Gomez MD   Specialty: Family Medicine   Relationship: PCP - General    [address]  [facility] [address]  [place] [address]   Phone: [phone]       Next Steps: Schedule an appointment as soon as possible for a visit in 2 week(s)    Instructions: Hospital discharge follow up              After-discharge care                Destination       *[facility]   Service: Nursing Home    [address] [facility]  [facility] [address]   Phone: [phone]

## 2025-04-29 NOTE — PLAN OF CARE
Plan to DC back to [facility]. Updates clinicals and DC orders provided via Epic.  SW to follow to coordinate DC with nursing

## 2025-04-29 NOTE — ASSESSMENT & PLAN NOTE
Patient's blood pressure range in the last 24 hours was: BP  Min: 133/63  Max: 173/72.The patient's inpatient anti-hypertensive regimen is listed below:  Current Antihypertensives  hydrALAZINE injection 10 mg, Every 6 hours PRN, Intravenous  losartan tablet 50 mg, 2 times daily, Oral  metoprolol succinate (TOPROL-XL) 24 hr split tablet 12.5 mg, Daily, Oral  furosemide tablet 20 mg, Daily, Oral  metoprolol succinate (TOPROL-XL) 24 hr tablet, Daily, Oral    Plan  - BP was elevated and treated with IV labetalol per ED  - Losartan resumed   - Metoprolol held due to recent bradycardia- may resume at lower dose   -Hydralazine PRN

## 2025-05-01 ENCOUNTER — OUTPATIENT CASE MANAGEMENT (OUTPATIENT)
Dept: ADMINISTRATIVE | Facility: OTHER | Age: 85
End: 2025-05-01
Payer: MEDICARE

## 2025-05-01 LAB
BACTERIA BLD CULT: NORMAL
BACTERIA BLD CULT: NORMAL

## 2025-05-01 NOTE — PROGRESS NOTES
Outpatient Care Management  Patient Not Qualified    Patient: Leslie Wood  MRN:  [id number]  Date of Service:  5/1/2025  Completed by:  Alejandra Frankel RN    Chief Complaint   Patient presents with    [facility] Enrollment Call    Case Closure       Patient Summary           Reason Not Qualified:  Resides in Nursing Home

## 2025-05-05 ENCOUNTER — TELEPHONE (OUTPATIENT)
Dept: PRIMARY CARE CLINIC | Facility: CLINIC | Age: 85
End: 2025-05-05
Payer: MEDICARE

## 2025-05-05 NOTE — TELEPHONE ENCOUNTER
PC with Mis about appt scheduled for today.  Explained to her that pt is being cared for by  at [facility]- advised that we would not be able to change any of her meds or start a new treatment plan.  She verbalized understanding.  States she is going to request a meeting with  at [facility].  Advised her that she should call insurance and get Dr. Gomez removed as PCP.  I told her to call the clinic with any questions or concerns.

## 2025-05-08 ENCOUNTER — HOSPITAL ENCOUNTER (INPATIENT)
Facility: HOSPITAL | Age: 85
LOS: 2 days | Discharge: HOME | DRG: 392 | End: 2025-05-11
Attending: EMERGENCY MEDICINE | Admitting: FAMILY MEDICINE
Payer: MEDICARE

## 2025-05-08 DIAGNOSIS — K57.92 DIVERTICULITIS: ICD-10-CM

## 2025-05-08 DIAGNOSIS — R79.89 ELEVATED BRAIN NATRIURETIC PEPTIDE (BNP) LEVEL: ICD-10-CM

## 2025-05-08 DIAGNOSIS — R41.82 AMS (ALTERED MENTAL STATUS): Primary | ICD-10-CM

## 2025-05-08 DIAGNOSIS — R07.9 CHEST PAIN: ICD-10-CM

## 2025-05-08 LAB
ABSOLUTE EOSINOPHIL (OHS): 0 K/UL
ABSOLUTE MONOCYTE (OHS): 1.63 K/UL (ref 0.3–1)
ABSOLUTE NEUTROPHIL COUNT (OHS): 9.62 K/UL (ref 1.8–7.7)
ALBUMIN SERPL BCP-MCNC: 1.9 G/DL (ref 3.5–5.2)
ALLENS TEST: ABNORMAL
ALP SERPL-CCNC: 80 UNIT/L (ref 40–150)
ALT SERPL W/O P-5'-P-CCNC: 32 UNIT/L (ref 10–44)
ANION GAP (OHS): 9 MMOL/L (ref 8–16)
AST SERPL-CCNC: 51 UNIT/L (ref 11–45)
BACTERIA #/AREA URNS AUTO: ABNORMAL /HPF
BASOPHILS # BLD AUTO: 0.04 K/UL
BASOPHILS NFR BLD AUTO: 0.3 %
BILIRUB SERPL-MCNC: 0.5 MG/DL (ref 0.1–1)
BILIRUB UR QL STRIP.AUTO: NEGATIVE
BNP SERPL-MCNC: 621 PG/ML (ref 0–99)
BUN SERPL-MCNC: 18 MG/DL (ref 8–23)
CALCIUM SERPL-MCNC: 8.7 MG/DL (ref 8.7–10.5)
CHLORIDE SERPL-SCNC: 101 MMOL/L (ref 95–110)
CLARITY UR: CLEAR
CO2 SERPL-SCNC: 30 MMOL/L (ref 23–29)
COLOR UR AUTO: YELLOW
CREAT SERPL-MCNC: 0.8 MG/DL (ref 0.5–1.4)
DELSYS: ABNORMAL
ERYTHROCYTE [DISTWIDTH] IN BLOOD BY AUTOMATED COUNT: 15.1 % (ref 11.5–14.5)
FIO2: 28
FLOW: 2
GFR SERPLBLD CREATININE-BSD FMLA CKD-EPI: >60 ML/MIN/1.73/M2
GLUCOSE SERPL-MCNC: 128 MG/DL (ref 70–110)
GLUCOSE UR QL STRIP: NEGATIVE
HCO3 UR-SCNC: 34.1 MMOL/L (ref 24–28)
HCT VFR BLD AUTO: 30.8 % (ref 37–48.5)
HCV AB SERPL QL IA: NEGATIVE
HGB BLD-MCNC: 9.5 GM/DL (ref 12–16)
HGB UR QL STRIP: NEGATIVE
HIV 1+2 AB+HIV1 P24 AG SERPL QL IA: NEGATIVE
HOLD SPECIMEN: NORMAL
HYALINE CASTS UR QL AUTO: 0 /LPF (ref 0–1)
IMM GRANULOCYTES # BLD AUTO: 0.08 K/UL (ref 0–0.04)
IMM GRANULOCYTES NFR BLD AUTO: 0.6 % (ref 0–0.5)
INFLUENZA A MOLECULAR (OHS): NEGATIVE
INFLUENZA B MOLECULAR (OHS): NEGATIVE
KETONES UR QL STRIP: NEGATIVE
LACTATE SERPL-SCNC: 0.8 MMOL/L (ref 0.5–2.2)
LEUKOCYTE ESTERASE UR QL STRIP: ABNORMAL
LIPASE SERPL-CCNC: 30 U/L (ref 4–60)
LYMPHOCYTES # BLD AUTO: 1.88 K/UL (ref 1–4.8)
MAGNESIUM SERPL-MCNC: 1.9 MG/DL (ref 1.6–2.6)
MCH RBC QN AUTO: 27.5 PG (ref 27–31)
MCHC RBC AUTO-ENTMCNC: 30.8 G/DL (ref 32–36)
MCV RBC AUTO: 89 FL (ref 82–98)
MICROSCOPIC COMMENT: ABNORMAL
MODE: ABNORMAL
NITRITE UR QL STRIP: NEGATIVE
NUCLEATED RBC (/100WBC) (OHS): 0 /100 WBC
PCO2 BLDA: 45.1 MMHG (ref 35–45)
PH SMN: 7.49 [PH] (ref 7.35–7.45)
PH UR STRIP: 7 [PH]
PLATELET # BLD AUTO: 286 K/UL (ref 150–450)
PMV BLD AUTO: 9.2 FL (ref 9.2–12.9)
PO2 BLDA: 114 MMHG (ref 80–100)
POC BE: 11 MMOL/L (ref -2–2)
POC SATURATED O2: 99 % (ref 95–100)
POCT GLUCOSE: 136 MG/DL (ref 70–110)
POTASSIUM SERPL-SCNC: 3.5 MMOL/L (ref 3.5–5.1)
PROCALCITONIN SERPL-MCNC: 0.17 NG/ML
PROT SERPL-MCNC: 7.7 GM/DL (ref 6–8.4)
PROT UR QL STRIP: ABNORMAL
RBC # BLD AUTO: 3.46 M/UL (ref 4–5.4)
RBC #/AREA URNS AUTO: 4 /HPF (ref 0–4)
RELATIVE EOSINOPHIL (OHS): 0 %
RELATIVE LYMPHOCYTE (OHS): 14.2 % (ref 18–48)
RELATIVE MONOCYTE (OHS): 12.3 % (ref 4–15)
RELATIVE NEUTROPHIL (OHS): 72.6 % (ref 38–73)
SAMPLE: ABNORMAL
SARS-COV-2 RDRP RESP QL NAA+PROBE: NEGATIVE
SITE: ABNORMAL
SODIUM SERPL-SCNC: 140 MMOL/L (ref 136–145)
SP GR UR STRIP: 1.02
SQUAMOUS #/AREA URNS AUTO: 3 /HPF
TROPONIN I SERPL DL<=0.01 NG/ML-MCNC: 0.06 NG/ML
UROBILINOGEN UR STRIP-ACNC: NEGATIVE EU/DL
WBC # BLD AUTO: 13.25 K/UL (ref 3.9–12.7)
WBC #/AREA URNS AUTO: 29 /HPF (ref 0–5)

## 2025-05-08 PROCEDURE — 83605 ASSAY OF LACTIC ACID: CPT | Performed by: EMERGENCY MEDICINE

## 2025-05-08 PROCEDURE — G0378 HOSPITAL OBSERVATION PER HR: HCPCS

## 2025-05-08 PROCEDURE — 87040 BLOOD CULTURE FOR BACTERIA: CPT | Mod: 91 | Performed by: EMERGENCY MEDICINE

## 2025-05-08 PROCEDURE — 82962 GLUCOSE BLOOD TEST: CPT

## 2025-05-08 PROCEDURE — 87502 INFLUENZA DNA AMP PROBE: CPT | Performed by: EMERGENCY MEDICINE

## 2025-05-08 PROCEDURE — 87086 URINE CULTURE/COLONY COUNT: CPT | Performed by: EMERGENCY MEDICINE

## 2025-05-08 PROCEDURE — 83690 ASSAY OF LIPASE: CPT | Performed by: EMERGENCY MEDICINE

## 2025-05-08 PROCEDURE — 93005 ELECTROCARDIOGRAM TRACING: CPT

## 2025-05-08 PROCEDURE — 87389 HIV-1 AG W/HIV-1&-2 AB AG IA: CPT | Performed by: EMERGENCY MEDICINE

## 2025-05-08 PROCEDURE — 93010 ELECTROCARDIOGRAM REPORT: CPT | Mod: ,,, | Performed by: INTERNAL MEDICINE

## 2025-05-08 PROCEDURE — 96375 TX/PRO/DX INJ NEW DRUG ADDON: CPT

## 2025-05-08 PROCEDURE — 86803 HEPATITIS C AB TEST: CPT | Performed by: EMERGENCY MEDICINE

## 2025-05-08 PROCEDURE — 85025 COMPLETE CBC W/AUTO DIFF WBC: CPT | Performed by: EMERGENCY MEDICINE

## 2025-05-08 PROCEDURE — 63600175 PHARM REV CODE 636 W HCPCS: Performed by: EMERGENCY MEDICINE

## 2025-05-08 PROCEDURE — 83880 ASSAY OF NATRIURETIC PEPTIDE: CPT | Performed by: EMERGENCY MEDICINE

## 2025-05-08 PROCEDURE — 81001 URINALYSIS AUTO W/SCOPE: CPT | Performed by: EMERGENCY MEDICINE

## 2025-05-08 PROCEDURE — 84484 ASSAY OF TROPONIN QUANT: CPT | Performed by: EMERGENCY MEDICINE

## 2025-05-08 PROCEDURE — 84145 PROCALCITONIN (PCT): CPT | Performed by: EMERGENCY MEDICINE

## 2025-05-08 PROCEDURE — 25500020 PHARM REV CODE 255: Performed by: EMERGENCY MEDICINE

## 2025-05-08 PROCEDURE — 96361 HYDRATE IV INFUSION ADD-ON: CPT

## 2025-05-08 PROCEDURE — 99285 EMERGENCY DEPT VISIT HI MDM: CPT | Mod: 25

## 2025-05-08 PROCEDURE — 25000003 PHARM REV CODE 250: Performed by: EMERGENCY MEDICINE

## 2025-05-08 PROCEDURE — U0002 COVID-19 LAB TEST NON-CDC: HCPCS | Performed by: EMERGENCY MEDICINE

## 2025-05-08 PROCEDURE — 83735 ASSAY OF MAGNESIUM: CPT | Performed by: EMERGENCY MEDICINE

## 2025-05-08 PROCEDURE — 80053 COMPREHEN METABOLIC PANEL: CPT | Performed by: EMERGENCY MEDICINE

## 2025-05-08 RX ORDER — CEFEPIME HYDROCHLORIDE 2 G/1
2 INJECTION, POWDER, FOR SOLUTION INTRAVENOUS
Status: COMPLETED | OUTPATIENT
Start: 2025-05-08 | End: 2025-05-08

## 2025-05-08 RX ORDER — FUROSEMIDE 10 MG/ML
40 INJECTION INTRAMUSCULAR; INTRAVENOUS
Status: COMPLETED | OUTPATIENT
Start: 2025-05-08 | End: 2025-05-08

## 2025-05-08 RX ORDER — CEFTRIAXONE 2 G/1
2 INJECTION, POWDER, FOR SOLUTION INTRAMUSCULAR; INTRAVENOUS
Status: DISCONTINUED | OUTPATIENT
Start: 2025-05-09 | End: 2025-05-11 | Stop reason: HOSPADM

## 2025-05-08 RX ORDER — METRONIDAZOLE 500 MG/100ML
500 INJECTION, SOLUTION INTRAVENOUS
Status: DISCONTINUED | OUTPATIENT
Start: 2025-05-09 | End: 2025-05-11 | Stop reason: HOSPADM

## 2025-05-08 RX ORDER — LIDOCAINE 50 MG/G
1 PATCH TOPICAL
COMMUNITY
Start: 2025-05-06 | End: 2025-05-13

## 2025-05-08 RX ADMIN — SODIUM CHLORIDE 1638 ML: 9 INJECTION, SOLUTION INTRAVENOUS at 06:05

## 2025-05-08 RX ADMIN — IOHEXOL 100 ML: 350 INJECTION, SOLUTION INTRAVENOUS at 08:05

## 2025-05-08 RX ADMIN — FUROSEMIDE 40 MG: 10 INJECTION, SOLUTION INTRAMUSCULAR; INTRAVENOUS at 11:05

## 2025-05-08 RX ADMIN — CEFEPIME 2 G: 2 INJECTION, POWDER, FOR SOLUTION INTRAVENOUS at 06:05

## 2025-05-08 NOTE — ED PROVIDER NOTES
SCRIBE #1 NOTE: I, Agapito Sears, am scribing for, and in the presence of, Rob Sanchez DO. I have scribed the entire note.       History     Chief Complaint   Patient presents with    Altered Mental Status     Pt brought in by [facility] from [facility] for AMS and lethargy. Facility reports fever and pt not as alert as normal. Baseline GCS 14 with dementia. Facility also started the pt on O2 for 90% SPO2 on RA.      Review of patient's allergies indicates:   Allergen Reactions    Amitriptyline     Hydrochlorothiazide      Causes muscle cramping    Lisinopril      hyperkalemia    Opioids - morphine analogues Hallucinations     Chest pain and hallucinations    Oxycodone     Percocet [oxycodone-acetaminophen] Other (See Comments)     Seizures    Belbuca [buprenorphine hcl] Nausea And Vomiting and Other (See Comments)     Black out     Codeine Nausea Only and Rash    Prazosin Other (See Comments)     dizziness         History of Present Illness     HPI    5/8/2025, 5:53 PM  History obtained from the nursing station    HPI limited due to AMS.      History of Present Illness: Leslie Wood is a 84 y.o. female patient with a PMHx of HTN, arthritis, cataract, GERD, stroke, MI, dementia, and Alzheimer's who presents to the Emergency Department for evaluation of AMS which began PTA. Pt brought in by [facility] from [facility] facility for AMS and lethargy. Per nursing station, Facility confirms fever and the pt is not as alert as normal. Baseline GCS 14 with dementia. Per nursing station, Facility started the pt on O2 for 90% SPO2 on RA. Per nurse, pt is oriented to self. Symptoms are constant and moderate in severity. No mitigating or exacerbating factors reported. Associated sxs include weakness and dizziness. Per nurse, Patient denies any SOB. No prior Tx specified.  No further complaints or concerns at this time.       Arrival mode: Ambulance Service    PCP: No primary care provider on file.        Past Medical  History:  Past Medical History:   Diagnosis Date    Alzheimer's disease, unspecified (CODE)     Arthritis     Cataract     Dementia without behavioral disturbance     GERD (gastroesophageal reflux disease)     Heart attack 05/23/2022    Hypertension     Stroke        Past Surgical History:  Past Surgical History:   Procedure Laterality Date    ADENOIDECTOMY      ADRENAL GLAND SURGERY      APPENDECTOMY      BACK SURGERY      fusion l 4-5 s 1,2,3  fusion l 2-3    CORONARY ANGIOGRAPHY N/A 05/20/2022    Procedure: ANGIOGRAM, CORONARY ARTERY;  Surgeon: Domingo Martins MD;  Location: [facility] CATH LAB;  Service: Cardiology;  Laterality: N/A;    CORONARY ANGIOGRAPHY N/A 05/24/2022    Procedure: ANGIOGRAM, CORONARY ARTERY;  Surgeon: Domingo Martins MD;  Location: [facility] CATH LAB;  Service: Cardiology;  Laterality: N/A;    EYE SURGERY      HEMORRHOID SURGERY      HERNIA REPAIR      HYSTERECTOMY      partial    indirect lumbar decompression      percutaneous placement of interspinous extension blocker    LEFT HEART CATHETERIZATION Left 05/20/2022    Procedure: CATHETERIZATION, HEART, LEFT;  Surgeon: Domingo Martins MD;  Location: [facility] CATH LAB;  Service: Cardiology;  Laterality: Left;    TONSILLECTOMY           Family History:  Family History   Problem Relation Name Age of Onset    Heart disease Mother      Hypertension Mother      Diabetes Mother      Hypertension Father      Kidney disease Father      Breast cancer Sister         Social History:  Social History     Tobacco Use    Smoking status: Never    Smokeless tobacco: Never   Substance and Sexual Activity    Alcohol use: No    Drug use: No    Sexual activity: Not Currently        Review of Systems     As noted in HPI.  Review of Systems     Physical Exam     Initial Vitals [05/08/25 1738]   BP Pulse Resp Temp SpO2   (!) 161/60 80 20 99.4 °F (37.4 °C) 97 %      MAP       --          Physical Exam  Constitutional:       Comments: Patient is lethargic   Cardiovascular:       Rate and Rhythm: Normal rate and regular rhythm.      Heart sounds: No murmur heard.  Pulmonary:      Effort: Pulmonary effort is normal. No respiratory distress.      Comments: Decreased breath sounds bilaterally  Abdominal:      General: There is no distension.      Palpations: Abdomen is soft.      Tenderness: There is no guarding.   Genitourinary:     Comments: A vaginal exam was done, no unusual abnormalities noted, no unusual discharge  Musculoskeletal:         General: Normal range of motion.   Skin:     General: Skin is warm and dry.      Findings: No rash.   Neurological:      Comments: Patient lethargic, will awaken to voice answer questions and follow basic commands         Nursing Notes and Vital Signs Reviewed.     ED Course   Critical Care    Date/Time: 5/8/2025 11:30 PM    Performed by: Rob Sanchez DO  Authorized by: Rob Sanchez DO  Direct patient critical care time: 20 minutes  Ordering / reviewing critical care time: 10 minutes  Documentation critical care time: 5 minutes  Consulting other physicians critical care time: 10 minutes  Total critical care time (exclusive of procedural time) : 45 minutes  Critical care time was exclusive of separately billable procedures and treating other patients.  Critical care was necessary to treat or prevent imminent or life-threatening deterioration of the following conditions: sepsis.  Critical care was time spent personally by me on the following activities: discussions with consultants, development of treatment plan with patient or surrogate, interpretation of cardiac output measurements, evaluation of patient's response to treatment, examination of patient, obtaining history from patient or surrogate, ordering and performing treatments and interventions, ordering and review of laboratory studies, ordering and review of radiographic studies, pulse oximetry, re-evaluation of patient's condition and review of old charts.        ED Vital  Signs:  Vitals:    05/10/25 2315 05/11/25 0059 05/11/25 0155 05/11/25 0315   BP:  135/68     Pulse: 79 71 69 77   Resp:  16     Temp:  98.4 °F (36.9 °C)     TempSrc:  Oral     SpO2:  99%     Weight:       Height:        05/11/25 0355 05/11/25 0409 05/11/25 0413 05/11/25 0418   BP: (!) 185/75 (!) 140/60 (!) 151/68    Pulse: 77 62 64 60   Resp: 18      Temp: 97.7 °F (36.5 °C)      TempSrc:       SpO2: 99%      Weight:       Height:        05/11/25 0515 05/11/25 0725 05/11/25 0809 05/11/25 0900   BP:       Pulse: 60 68 66 66   Resp:       Temp:       TempSrc:       SpO2:   99%    Weight:       Height:        05/11/25 1007 05/11/25 1100 05/11/25 1300   BP: (!) 157/70     Pulse: 67 72 80   Resp: 18     Temp: 97.8 °F (36.6 °C)     TempSrc: Oral     SpO2: 99%     Weight:      Height:          Abnormal Lab Results:  Labs Reviewed   COMPREHENSIVE METABOLIC PANEL - Abnormal       Result Value    Sodium 140      Potassium 3.5      Chloride 101      CO2 30 (*)     Glucose 128 (*)     BUN 18      Creatinine 0.8      Calcium 8.7      Protein Total 7.7      Albumin 1.9 (*)     Bilirubin Total 0.5      ALP 80      AST 51 (*)     ALT 32      Anion Gap 9      eGFR >60     URINALYSIS, REFLEX TO URINE CULTURE - Abnormal    Color, UA Yellow      Appearance, UA Clear      pH, UA 7.0      Spec Grav UA 1.020      Protein, UA 2+ (*)     Glucose, UA Negative      Ketones, UA Negative      Bilirubin, UA Negative      Blood, UA Negative      Nitrites, UA Negative      Urobilinogen, UA Negative      Leukocyte Esterase, UA 1+ (*)    B-TYPE NATRIURETIC PEPTIDE - Abnormal     (*)    TROPONIN I - Abnormal    Troponin-I 0.058 (*)    CBC WITH DIFFERENTIAL - Abnormal    WBC 13.25 (*)     RBC 3.46 (*)     HGB 9.5 (*)     HCT 30.8 (*)     MCV 89      MCH 27.5      MCHC 30.8 (*)     RDW 15.1 (*)     Platelet Count 286      MPV 9.2      Nucleated RBC 0      Neut % 72.6      Lymph % 14.2 (*)     Mono % 12.3      Eos % 0.0      Basophil % 0.3       Imm Grans % 0.6 (*)     Neut # 9.62 (*)     Lymph # 1.88      Mono # 1.63 (*)     Eos # 0.00      Baso # 0.04      Imm Grans # 0.08 (*)    URINALYSIS MICROSCOPIC - Abnormal    RBC, UA 4      WBC, UA 29 (*)     Bacteria, UA None      Squamous Epithelial Cells, UA 3      Hyaline Casts, UA 0      Microscopic Comment       POCT GLUCOSE - Abnormal    POCT Glucose 136 (*)    ISTAT PROCEDURE - Abnormal    POC PH 7.487 (*)     POC PCO2 45.1 (*)     POC PO2 114 (*)     POC HCO3 34.1 (*)     POC BE 11 (*)     POC SATURATED O2 99      Sample ARTERIAL      Site LB      Allens Test Pass      DelSys Nasal Can      Mode SPONT      Flow 2      FiO2 28     INFLUENZA A & B BY MOLECULAR - Normal    INFLUENZA A MOLECULAR Negative      INFLUENZA B MOLECULAR  Negative     HEPATITIS C ANTIBODY - Normal    Hep C Ab Interp Negative     HIV 1 / 2 ANTIBODY - Normal    HIV 1/2 Ag/Ab Negative     LACTIC ACID, PLASMA - Normal    Lactic Acid Level 0.8      Narrative:     Falsely low lactic acid results can be found in samples containing >=13.0 mg/dL total bilirubin and/or >=3.5 mg/dL direct bilirubin.    LACTIC ACID, PLASMA - Normal    Lactic Acid Level 0.8      Narrative:     Falsely low lactic acid results can be found in samples containing >=13.0 mg/dL total bilirubin and/or >=3.5 mg/dL direct bilirubin.    MAGNESIUM - Normal    Magnesium  1.9     LIPASE - Normal    Lipase Level 30     PROCALCITONIN - Normal    Procalcitonin 0.17     SARS-COV-2 RNA AMPLIFICATION, QUAL - Normal    SARS COV-2 Molecular Negative     CBC W/ AUTO DIFFERENTIAL    Narrative:     The following orders were created for panel order CBC auto differential.  Procedure                               Abnormality         Status                     ---------                               -----------         ------                     CBC with Differential[[id number]]       Abnormal            Final result                 Please view results for these tests on the individual  orders.   GREY TOP URINE HOLD    Extra Tube Hold for add-ons.     HEP C VIRUS HOLD SPECIMEN        All Lab Results:  Results for orders placed or performed during the hospital encounter of 05/08/25   EKG 12-lead    Collection Time: 05/08/25  6:15 PM   Result Value Ref Range    QRS Duration 70 ms    [facility] QTC Calculation 434 ms   POCT glucose    Collection Time: 05/08/25  6:21 PM   Result Value Ref Range    POCT Glucose 136 (H) 70 - 110 mg/dL   Blood culture x two cultures. Draw prior to antibiotics.    Collection Time: 05/08/25  6:23 PM    Specimen: Peripheral, Antecubital, Right; Blood   Result Value Ref Range    Blood Culture No Growth After 96 hours    Blood culture x two cultures. Draw prior to antibiotics.    Collection Time: 05/08/25  6:23 PM    Specimen: Peripheral, Antecubital, Left; Blood   Result Value Ref Range    Blood Culture No Growth After 96 hours    Hepatitis C Antibody    Collection Time: 05/08/25  6:23 PM   Result Value Ref Range    Hep C Ab Interp Negative Negative   HIV 1/2 Ag/Ab (4th Gen)    Collection Time: 05/08/25  6:23 PM   Result Value Ref Range    HIV 1/2 Ag/Ab Negative Negative   Comprehensive metabolic panel    Collection Time: 05/08/25  6:23 PM   Result Value Ref Range    Sodium 140 136 - 145 mmol/L    Potassium 3.5 3.5 - 5.1 mmol/L    Chloride 101 95 - 110 mmol/L    CO2 30 (H) 23 - 29 mmol/L    Glucose 128 (H) 70 - 110 mg/dL    BUN 18 8 - 23 mg/dL    Creatinine 0.8 0.5 - 1.4 mg/dL    Calcium 8.7 8.7 - 10.5 mg/dL    Protein Total 7.7 6.0 - 8.4 gm/dL    Albumin 1.9 (L) 3.5 - 5.2 g/dL    Bilirubin Total 0.5 0.1 - 1.0 mg/dL    ALP 80 40 - 150 unit/L    AST 51 (H) 11 - 45 unit/L    ALT 32 10 - 44 unit/L    Anion Gap 9 8 - 16 mmol/L    eGFR >60 >60 mL/min/1.73/m2   Lactic acid, plasma #1    Collection Time: 05/08/25  6:23 PM   Result Value Ref Range    Lactic Acid Level 0.8 0.5 - 2.2 mmol/L   Magnesium    Collection Time: 05/08/25  6:23 PM   Result Value Ref Range    Magnesium  1.9 1.6 - 2.6  mg/dL   Brain natriuretic peptide    Collection Time: 05/08/25  6:23 PM   Result Value Ref Range     (H) 0 - 99 pg/mL   Lipase    Collection Time: 05/08/25  6:23 PM   Result Value Ref Range    Lipase Level 30 4 - 60 U/L   Troponin I    Collection Time: 05/08/25  6:23 PM   Result Value Ref Range    Troponin-I 0.058 (H) <=0.026 ng/mL   Procalcitonin    Collection Time: 05/08/25  6:23 PM   Result Value Ref Range    Procalcitonin 0.17 <0.25 ng/mL   CBC with Differential    Collection Time: 05/08/25  6:23 PM   Result Value Ref Range    WBC 13.25 (H) 3.90 - 12.70 K/uL    RBC 3.46 (L) 4.00 - 5.40 M/uL    HGB 9.5 (L) 12.0 - 16.0 gm/dL    HCT 30.8 (L) 37.0 - 48.5 %    MCV 89 82 - 98 fL    MCH 27.5 27.0 - 31.0 pg    MCHC 30.8 (L) 32.0 - 36.0 g/dL    RDW 15.1 (H) 11.5 - 14.5 %    Platelet Count 286 150 - 450 K/uL    MPV 9.2 9.2 - 12.9 fL    Nucleated RBC 0 <=0 /100 WBC    Neut % 72.6 38 - 73 %    Lymph % 14.2 (L) 18 - 48 %    Mono % 12.3 4 - 15 %    Eos % 0.0 <=8 %    Basophil % 0.3 <=1.9 %    Imm Grans % 0.6 (H) 0.0 - 0.5 %    Neut # 9.62 (H) 1.8 - 7.7 K/uL    Lymph # 1.88 1 - 4.8 K/uL    Mono # 1.63 (H) 0.3 - 1 K/uL    Eos # 0.00 <=0.5 K/uL    Baso # 0.04 <=0.2 K/uL    Imm Grans # 0.08 (H) 0.00 - 0.04 K/uL   ISTAT PROCEDURE    Collection Time: 05/08/25  6:33 PM   Result Value Ref Range    POC PH 7.487 (H) 7.35 - 7.45    POC PCO2 45.1 (H) 35 - 45 mmHg    POC PO2 114 (H) 80 - 100 mmHg    POC HCO3 34.1 (H) 24 - 28 mmol/L    POC BE 11 (H) -2 to 2 mmol/L    POC SATURATED O2 99 95 - 100 %    Sample ARTERIAL     Site LB     Allens Test Pass     DelSys Nasal Can     Mode SPONT     Flow 2     FiO2 28    Influenza A & B by Molecular    Collection Time: 05/08/25  6:46 PM    Specimen: Nasopharyngeal Swab   Result Value Ref Range    INFLUENZA A MOLECULAR Negative Negative    INFLUENZA B MOLECULAR  Negative Negative   COVID-19 Rapid Screening    Collection Time: 05/08/25  6:46 PM   Result Value Ref Range    SARS COV-2 Molecular  Negative Negative   Urine culture    Collection Time: 05/08/25  7:40 PM    Specimen: Urine, Catheterized   Result Value Ref Range    Urine Culture No Significant Growth    Urinalysis, Reflex to Urine Culture Urine, Clean Catch    Collection Time: 05/08/25  7:40 PM    Specimen: Urine, Catheterized   Result Value Ref Range    Color, UA Yellow Straw, Izabela, Yellow, Light-Orange    Appearance, UA Clear Clear    pH, UA 7.0 5.0 - 8.0    Spec Grav UA 1.020 1.005 - 1.030    Protein, UA 2+ (A) Negative    Glucose, UA Negative Negative    Ketones, UA Negative Negative    Bilirubin, UA Negative Negative    Blood, UA Negative Negative    Nitrites, UA Negative Negative    Urobilinogen, UA Negative <2.0 EU/dL    Leukocyte Esterase, UA 1+ (A) Negative   GREY TOP URINE HOLD    Collection Time: 05/08/25  7:40 PM   Result Value Ref Range    Extra Tube Hold for add-ons.    Urinalysis Microscopic    Collection Time: 05/08/25  7:40 PM   Result Value Ref Range    RBC, UA 4 0 - 4 /HPF    WBC, UA 29 (H) 0 - 5 /HPF    Bacteria, UA None None, Rare, Occasional /HPF    Squamous Epithelial Cells, UA 3 /HPF    Hyaline Casts, UA 0 0 - 1 /LPF    Microscopic Comment     EKG 12-lead    Collection Time: 05/08/25 10:59 PM   Result Value Ref Range    QRS Duration 68 ms    [facility]HS QTC Calculation 452 ms   Lactic acid, plasma #2    Collection Time: 05/08/25 11:32 PM   Result Value Ref Range    Lactic Acid Level 0.8 0.5 - 2.2 mmol/L   Troponin I    Collection Time: 05/09/25  1:40 AM   Result Value Ref Range    Troponin-I 0.067 (H) <=0.026 ng/mL   Type & Screen    Collection Time: 05/09/25  1:40 AM   Result Value Ref Range    Specimen Outdate 05/12/2025 23:59     Group & Rh O POS     Indirect Moraima NEG    Troponin I    Collection Time: 05/09/25  7:04 AM   Result Value Ref Range    Troponin-I 0.051 (H) <=0.026 ng/mL   Comprehensive Metabolic Panel (CMP)    Collection Time: 05/09/25  7:04 AM   Result Value Ref Range    Sodium 143 136 - 145 mmol/L    Potassium  2.9 (L) 3.5 - 5.1 mmol/L    Chloride 104 95 - 110 mmol/L    CO2 31 (H) 23 - 29 mmol/L    Glucose 94 70 - 110 mg/dL    BUN 17 8 - 23 mg/dL    Creatinine 0.7 0.5 - 1.4 mg/dL    Calcium 8.6 (L) 8.7 - 10.5 mg/dL    Protein Total 7.6 6.0 - 8.4 gm/dL    Albumin 1.9 (L) 3.5 - 5.2 g/dL    Bilirubin Total 0.3 0.1 - 1.0 mg/dL    ALP 82 40 - 150 unit/L    AST 47 (H) 11 - 45 unit/L    ALT 34 10 - 44 unit/L    Anion Gap 8 8 - 16 mmol/L    eGFR >60 >60 mL/min/1.73/m2   Magnesium    Collection Time: 05/09/25  7:04 AM   Result Value Ref Range    Magnesium  1.8 1.6 - 2.6 mg/dL   CBC with Differential    Collection Time: 05/09/25  7:04 AM   Result Value Ref Range    WBC 13.48 (H) 3.90 - 12.70 K/uL    RBC 3.22 (L) 4.00 - 5.40 M/uL    HGB 8.9 (L) 12.0 - 16.0 gm/dL    HCT 29.6 (L) 37.0 - 48.5 %    MCV 92 82 - 98 fL    MCH 27.6 27.0 - 31.0 pg    MCHC 30.1 (L) 32.0 - 36.0 g/dL    RDW 15.1 (H) 11.5 - 14.5 %    Platelet Count 292 150 - 450 K/uL    MPV 9.5 9.2 - 12.9 fL    Nucleated RBC 0 <=0 /100 WBC    Neut % 68.4 38 - 73 %    Lymph % 15.9 (L) 18 - 48 %    Mono % 14.3 4 - 15 %    Eos % 0.2 <=8 %    Basophil % 0.4 <=1.9 %    Imm Grans % 0.8 (H) 0.0 - 0.5 %    Neut # 9.21 (H) 1.8 - 7.7 K/uL    Lymph # 2.15 1 - 4.8 K/uL    Mono # 1.93 (H) 0.3 - 1 K/uL    Eos # 0.03 <=0.5 K/uL    Baso # 0.05 <=0.2 K/uL    Imm Grans # 0.11 (H) 0.00 - 0.04 K/uL   Comprehensive Metabolic Panel (CMP)    Collection Time: 05/10/25  7:22 AM   Result Value Ref Range    Sodium 142 136 - 145 mmol/L    Potassium 3.0 (L) 3.5 - 5.1 mmol/L    Chloride 104 95 - 110 mmol/L    CO2 26 23 - 29 mmol/L    Glucose 91 70 - 110 mg/dL    BUN 20 8 - 23 mg/dL    Creatinine 0.7 0.5 - 1.4 mg/dL    Calcium 8.3 (L) 8.7 - 10.5 mg/dL    Protein Total 6.7 6.0 - 8.4 gm/dL    Albumin 1.6 (L) 3.5 - 5.2 g/dL    Bilirubin Total 0.4 0.1 - 1.0 mg/dL    ALP 71 40 - 150 unit/L    AST 51 (H) 11 - 45 unit/L    ALT 34 10 - 44 unit/L    Anion Gap 12 8 - 16 mmol/L    eGFR >60 >60 mL/min/1.73/m2   Magnesium     Collection Time: 05/10/25  7:22 AM   Result Value Ref Range    Magnesium  1.8 1.6 - 2.6 mg/dL   CBC with Differential    Collection Time: 05/10/25  7:22 AM   Result Value Ref Range    WBC 11.49 3.90 - 12.70 K/uL    RBC 2.90 (L) 4.00 - 5.40 M/uL    HGB 7.9 (L) 12.0 - 16.0 gm/dL    HCT 25.7 (L) 37.0 - 48.5 %    MCV 89 82 - 98 fL    MCH 27.2 27.0 - 31.0 pg    MCHC 30.7 (L) 32.0 - 36.0 g/dL    RDW 15.1 (H) 11.5 - 14.5 %    Platelet Count 287 150 - 450 K/uL    MPV 9.6 9.2 - 12.9 fL    Nucleated RBC 0 <=0 /100 WBC    Neut % 72.3 38 - 73 %    Lymph % 15.4 (L) 18 - 48 %    Mono % 11.1 4 - 15 %    Eos % 0.3 <=8 %    Basophil % 0.2 <=1.9 %    Imm Grans % 0.7 (H) 0.0 - 0.5 %    Neut # 8.31 (H) 1.8 - 7.7 K/uL    Lymph # 1.77 1 - 4.8 K/uL    Mono # 1.27 (H) 0.3 - 1 K/uL    Eos # 0.04 <=0.5 K/uL    Baso # 0.02 <=0.2 K/uL    Imm Grans # 0.08 (H) 0.00 - 0.04 K/uL   Hemoglobin    Collection Time: 05/10/25 12:33 PM   Result Value Ref Range    HGB 8.0 (L) 12.0 - 16.0 gm/dL   Hematocrit    Collection Time: 05/10/25 12:33 PM   Result Value Ref Range    HCT 26.0 (L) 37.0 - 48.5 %   Iron and TIBC    Collection Time: 05/10/25 12:33 PM   Result Value Ref Range    Iron Level 19 (L) 30 - 160 ug/dL    Transferrin 99 (L) 200 - 375 mg/dL    Iron Binding Capacity Total 147 (L) 250 - 450 ug/dL    Iron Saturation 13 (L) 20 - 50 %   Ferritin    Collection Time: 05/10/25 12:33 PM   Result Value Ref Range    Ferritin 821.9 (H) 20.0 - 300.0 ng/mL   Echo Saline Bubble? No    Collection Time: 05/10/25  2:26 PM   Result Value Ref Range    BSA 1.67 m2    LVOT stroke volume 89.3 cm3    LVIDd 4.3 3.5 - 6.0 cm    LV Systolic Volume 42 mL    LV Systolic Volume Index 25.6 mL/m2    LVIDs 3.2 2.1 - 4.0 cm    LV Diastolic Volume 81 mL    LV Diastolic Volume Index 49.39 mL/m2    Left Ventricular End Systolic Volume by Teichholz Method 41.97 mL    Left Ventricular End Diastolic Volume by Teichholz Method 81.40 mL    IVS 0.7 0.6 - 1.1 cm    LVOT diameter 1.9 cm     LVOT area 2.8 cm2    FS 25.6 (A) 28 - 44 %    Left Ventricle Relative Wall Thickness 0.33 cm    PW 0.7 0.6 - 1.1 cm    LV mass 88.5 g    LV Mass Index 54.0 g/m2    MV Peak E Mehrdad 1.24 m/s    TDI LATERAL 0.09 m/s    TDI SEPTAL 0.07 m/s    E/E' ratio 16 m/s    MV Peak A Mehrdad 1.05 m/s    TR Max Mehrdad 3.1 m/s    E/A ratio 1.18     IVRT 65 msec    E wave deceleration time 198 msec    LV SEPTAL E/E' RATIO 17.7 m/s    LV LATERAL E/E' RATIO 13.8 m/s    LVOT peak mehrdad 1.4 m/s    Left Ventricular Outflow Tract Mean Velocity 0.80 cm/s    Left Ventricular Outflow Tract Mean Gradient 3.02 mmHg    TAPSE 2.2 cm    LA size 2.9 cm    Left Atrium Minor Axis 5.5 cm    Left Atrium Major Axis 5.5 cm    RA Major Axis 5.20 cm    AV regurgitation pressure 1/2 time 466 ms    AR Max Mehrdad 3.85 m/s    AV mean gradient 10 mmHg    AV peak gradient 19 mmHg    Ao peak mehrdad 2.2 m/s    Ao VTI 51.2 cm    LVOT peak VTI 31.5 cm    AV valve area 1.7 cm²    AV Velocity Ratio 0.64     AV index (prosthetic) 0.62     OCTAVIA by Velocity Ratio 1.8 cm²    Triscuspid Valve Regurgitation Peak Gradient 46 mmHg    PV PEAK VELOCITY 1.41 m/s    PV peak gradient 8 mmHg    Pulmonary Valve Mean Velocity 1.02 m/s    STJ 2.0 cm    IVC diameter 1.13 cm    Mean e' 0.08 m/s    ZLVIDS 0.89     ZLVIDD -0.71     TV resting pulmonary artery pressure 41 mmHg    RV TB RVSP 6 mmHg    Est. RA pres 3 mmHg   Troponin I    Collection Time: 05/10/25  9:46 PM   Result Value Ref Range    Troponin-I 0.011 <=0.026 ng/mL   EKG 12-lead    Collection Time: 05/10/25 10:21 PM   Result Value Ref Range    QRS Duration 70 ms    [facility]HS QTC Calculation 461 ms   Comprehensive Metabolic Panel (CMP)    Collection Time: 05/11/25  6:37 AM   Result Value Ref Range    Sodium 143 136 - 145 mmol/L    Potassium 3.7 3.5 - 5.1 mmol/L    Chloride 106 95 - 110 mmol/L    CO2 28 23 - 29 mmol/L    Glucose 105 70 - 110 mg/dL    BUN 20 8 - 23 mg/dL    Creatinine 0.8 0.5 - 1.4 mg/dL    Calcium 8.4 (L) 8.7 - 10.5 mg/dL    Protein  Total 6.8 6.0 - 8.4 gm/dL    Albumin 1.6 (L) 3.5 - 5.2 g/dL    Bilirubin Total 0.2 0.1 - 1.0 mg/dL    ALP 72 40 - 150 unit/L    AST 38 11 - 45 unit/L    ALT 30 10 - 44 unit/L    Anion Gap 9 8 - 16 mmol/L    eGFR >60 >60 mL/min/1.73/m2   Magnesium    Collection Time: 05/11/25  6:37 AM   Result Value Ref Range    Magnesium  1.9 1.6 - 2.6 mg/dL   CBC with Differential    Collection Time: 05/11/25  6:37 AM   Result Value Ref Range    WBC 10.07 3.90 - 12.70 K/uL    RBC 3.12 (L) 4.00 - 5.40 M/uL    HGB 8.4 (L) 12.0 - 16.0 gm/dL    HCT 28.4 (L) 37.0 - 48.5 %    MCV 91 82 - 98 fL    MCH 26.9 (L) 27.0 - 31.0 pg    MCHC 29.6 (L) 32.0 - 36.0 g/dL    RDW 15.4 (H) 11.5 - 14.5 %    Platelet Count 293 150 - 450 K/uL    MPV 9.6 9.2 - 12.9 fL    Nucleated RBC 0 <=0 /100 WBC    Neut % 61.9 38 - 73 %    Lymph % 21.9 18 - 48 %    Mono % 12.0 4 - 15 %    Eos % 3.2 <=8 %    Basophil % 0.4 <=1.9 %    Imm Grans % 0.6 (H) 0.0 - 0.5 %    Neut # 6.23 1.8 - 7.7 K/uL    Lymph # 2.21 1 - 4.8 K/uL    Mono # 1.21 (H) 0.3 - 1 K/uL    Eos # 0.32 <=0.5 K/uL    Baso # 0.04 <=0.2 K/uL    Imm Grans # 0.06 (H) 0.00 - 0.04 K/uL       Imaging Results:  Imaging Results              CT Chest Abdomen Pelvis With IV Contrast (XPD) NO Oral Contrast (Final result)  Result time 05/08/25 21:36:41      Final result by Romulo Mishra MD (Timothy) (05/08/25 21:36:41)                   Impression:     Findings suspicious for sigmoid diverticulitis.  Inflammatory changes extending to the vaginal cuff with air within the vagina.  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    Possible cystitis.    Small left pleural effusion.  Mild bilateral discoid atelectasis at the lung bases.    All CT scans at [this location] are performed using dose modulation techniques as appropriate to a performed exam including the following:  Automated exposure control; adjustment of the mA and/or kV according to patient size (this includes techniques or standardized protocols for targeted  exams where dose is matched to indication / reason for exam; i.e. extremities or head); use of iterative reconstruction technique.    Finalized on: 5/8/2025 9:36 PM By:  Tc Mishra MD  [facility]# [id number]      2025-05-08 21:38:[id number]     [facility]               Narrative:    EXAM:  CT CHEST ABDOMEN PELVIS WITH IV CONTRAST (XPD)    HISTORY: Sepsis    TECHNIQUE: Axial images were acquired.  The patient was given IV contrast    COMPARISON: CT abdomen pelvis 04/04/2024.    Chest Findings: The lungs appear clear.  There is no evidence of consolidation or infiltrate.  There are no lung nodules or masses.  No evidence of mediastinal or hilar adenopathy.  Small left pleural effusion with adjacent mild focal consolidation probably corresponding to atelectasis.  Mild atelectasis at the right base posteriorly.  No infiltrates.    Normal size heart.  Coronary artery calcifications.  No pericardial effusions.    Previous vertebral plasty at T10.  Spinal stimulator is seen at the mid and lower thoracic levels.  No acute bony changes.    TECHNIQUE: Axial images were acquired after the administration of IV contrast.  The examination was performed in conjunction with a CT scan of the chest.    Abdomen and Pelvis Findings: The liver appears normal.    Previous cholecystectomy.  No significant bile duct dilatation.  Stable focus of suspected fatty versus anterior to the liver measuring 4.3 cm.    Spleen appears normal.  The pancreas is normal.    No abnormal masses or evidence of adenopathy.  6 cm left renal cyst.  No renal masses or hydronephrosis.    No evidence of aneurysm of the abdominal aorta.  IVC appears unremarkable.  No suspicious masses or adenopathy.    No evidence of bowel obstruction.  No evidence of appendicitis.  There appears be thickening of the fat around the sigmoid colon which could be related to diverticulitis.  Inflammatory changes also may extend to the vaginal cuff on the left.  There is air noted within the vaginal  cuff raises the possibility of colovaginal fistula.     Mild bladder wall thickening.  Possible cystitis.    No acute skeletal findings.  Previous lumbar interbody fusion at L3-4.                                         X-Ray Chest AP Portable (Final result)  Result time 05/08/25 18:52:51      Final result by Romulo Mishra MD (Timothy) (05/08/25 18:52:51)                   Impression:     No definite infiltrates.    Finalized on: 5/8/2025 6:52 PM By:  Tc Mishra MD  [facility]# [id number]      2025-05-08 18:54:57.114     [facility]               Narrative:    EXAM: XR CHEST AP PORTABLE    CLINICAL HISTORY: Hypoxia    FINDINGS:  Comparison chest, 04/27/2025.    Atherosclerosis of thoracic aorta.    Normal size heart.  Lungs appear essentially clear.    Spinal stimulator seen at the mid and lower thoracic level.                                         The EKG was ordered, reviewed, and independently interpreted by the ED provider.  Interpretation time: 18:15  Rate: 70 BPM  Rhythm: normal sinus rhythm  Interpretation: No STEMI.         The Emergency Provider reviewed the vital signs and test results, which are outlined above.     ED Discussion     11:00 PM: Discussed pt's case with Josue Dickey MD (Colon and Rectal Surgery) concerning findings of diverticulitis with possible colovaginal fistula, CT scan was reviewed, who recommends Admit to medicine and we will see as a consult.    11:10 PM: Discussed case with Myranda Gray NP (Hospital Medicine). Dr. Stanford agrees with current care and management of pt and accepts admission.   Admitting Service: Hospital Medicine  Admitting Physician: Dr. Stanford  Admit to: Obs/Tele    11:10 PM: Re-evaluated pt. I have discussed test results, shared treatment plan, and the need for admission with patient/family/caretaker at bedside. Pt and/or family/caretaker express understanding at this time and agree with all information. All questions answered. Pt/caretaker/family member(s) have  no further questions or concerns at this time. Pt is ready for admit.    ED Course as of 05/13/25 2325   Thu May 08, 2025   2301 Urine culture  Pending [CD]   2301 Influenza A & B by Molecular  Negative [CD]   2301 ISTAT PROCEDURE(!)  Alkalosis [CD]   2301 CBC auto differential(!)  Leukocytosis and other nonspecific findings [CD]   2301 Urinalysis Microscopic(!)  Pyuria, urine culture pending [CD]   2302 COVID-19 Rapid Screening  Negative [CD]   2302 HIV 1/2 Ag/Ab (4th Gen)  Negative [CD]   2302 Procalcitonin  Within normal limits [CD]   2302 Troponin I(!)  Chronic elevation [CD]   2302 Magnesium  Within normal limits [CD]   2302 Lactic acid, plasma #1  Within normal limits [CD]   2302 Blood culture x two cultures. Draw prior to antibiotics.  Pending [CD]   2302 POCT glucose(!)  Hyperglycemia [CD]   2302 Comprehensive metabolic panel(!)  Hyperglycemia with other nonspecific findings, hypoalbuminemia [CD]   2302 Lipase  Within normal limits [CD]   2302 Brain natriuretic peptide(!)  Elevation, we will give Lasix [CD]   2302 Hepatitis C Antibody  Negative [CD]   2303 Urinalysis, Reflex to Urine Culture Urine, Clean Catch(!)  Pyuria [CD]   2303 CT Chest Abdomen Pelvis With IV Contrast (XPD) NO Oral Contrast   Findings suspicious for sigmoid diverticulitis.  Inflammatory changes extending to the vaginal cuff with air within the vagina.  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     Possible cystitis.     Small left pleural effusion.  Mild bilateral discoid atelectasis at the lung bases.   [CD]   2303 X-Ray Chest AP Portable  No acute findings [CD]      ED Course User Index  [CD] Rob Sanchez, DO     Medical Decision Making  Amount and/or Complexity of Data Reviewed  Labs: ordered. Decision-making details documented in ED Course.  Radiology: ordered. Decision-making details documented in ED Course.  ECG/medicine tests: ordered and independent interpretation performed. Decision-making details documented in ED  Course.    Risk  Prescription drug management.  Decision regarding hospitalization.  Risk Details: Differential diagnosis includes but is not limited to:  ACS, heart failure, UTI, pneumonia, pulmonary embolism, diverticulitis, electrolyte abnormality,                ED Medication(s):  Medications   sodium chloride 0.9% bolus 1,638 mL 1,638 mL (0 mLs Intravenous Stopped 5/8/25 2030)   ceFEPIme injection 2 g (2 g Intravenous Given 5/8/25 1839)   iohexoL (OMNIPAQUE 350) injection 100 mL (100 mLs Intravenous Given 5/8/25 2046)   furosemide injection 40 mg (40 mg Intravenous Given 5/8/25 2319)   potassium bicarbonate disintegrating tablet 50 mEq (50 mEq Oral Given 5/10/25 1224)   nitroGLYCERIN SL tablet 0.4 mg (0.4 mg Sublingual Given 5/10/25 2109)       Discharge Medication List as of 5/11/2025 11:05 AM        START taking these medications    Details   amoxicillin-clavulanate 875-125mg (AUGMENTIN) 875-125 mg per tablet Take 1 tablet by mouth 2 (two) times daily. for 7 days, Starting Sun 5/11/2025, Until Sun 5/18/2025, No Print      ferrous sulfate 324 mg (65 mg iron) TbEC Take 1 tablet (324 mg total) by mouth once daily., Starting Sun 5/11/2025, Until Tue 6/10/2025, No Print              Contact information for after-discharge care       Destination       [facility] .    Service: Nursing Home  Contact information:  [address]  [facility] [id number] [phone]                                       Scribe Attestation:   Scribe #1: I performed the above scribed service and the documentation accurately describes the services I performed. I attest to the accuracy of the note.     Attending:   Physician Attestation Statement for Scribe #1: I, Rob Sanchez DO., personally performed the services described in this documentation, as scribed by Agapito Sears, in my presence, and it is both accurate and complete.           Clinical Impression       ICD-10-CM ICD-9-CM   1. AMS  (altered mental status)  R41.82 780.97   2. Diverticulitis  K57.92 562.11   3. Elevated brain natriuretic peptide (BNP) level  R79.89 790.99   4. Chest pain  R07.9 786.50       Disposition:   Disposition: Placed in Observation  Condition: Rob Reyes,   05/13/25 2560

## 2025-05-09 PROBLEM — R93.5 ABNORMAL ABDOMINAL CT SCAN: Status: ACTIVE | Noted: 2025-05-09

## 2025-05-09 PROBLEM — K57.92 DIVERTICULITIS: Status: ACTIVE | Noted: 2025-05-09

## 2025-05-09 LAB
ABSOLUTE EOSINOPHIL (OHS): 0.03 K/UL
ABSOLUTE MONOCYTE (OHS): 1.93 K/UL (ref 0.3–1)
ABSOLUTE NEUTROPHIL COUNT (OHS): 9.21 K/UL (ref 1.8–7.7)
ALBUMIN SERPL BCP-MCNC: 1.9 G/DL (ref 3.5–5.2)
ALP SERPL-CCNC: 82 UNIT/L (ref 40–150)
ALT SERPL W/O P-5'-P-CCNC: 34 UNIT/L (ref 10–44)
ANION GAP (OHS): 8 MMOL/L (ref 8–16)
AST SERPL-CCNC: 47 UNIT/L (ref 11–45)
BASOPHILS # BLD AUTO: 0.05 K/UL
BASOPHILS NFR BLD AUTO: 0.4 %
BILIRUB SERPL-MCNC: 0.3 MG/DL (ref 0.1–1)
BUN SERPL-MCNC: 17 MG/DL (ref 8–23)
CALCIUM SERPL-MCNC: 8.6 MG/DL (ref 8.7–10.5)
CHLORIDE SERPL-SCNC: 104 MMOL/L (ref 95–110)
CO2 SERPL-SCNC: 31 MMOL/L (ref 23–29)
CREAT SERPL-MCNC: 0.7 MG/DL (ref 0.5–1.4)
ERYTHROCYTE [DISTWIDTH] IN BLOOD BY AUTOMATED COUNT: 15.1 % (ref 11.5–14.5)
GFR SERPLBLD CREATININE-BSD FMLA CKD-EPI: >60 ML/MIN/1.73/M2
GLUCOSE SERPL-MCNC: 94 MG/DL (ref 70–110)
HCT VFR BLD AUTO: 29.6 % (ref 37–48.5)
HGB BLD-MCNC: 8.9 GM/DL (ref 12–16)
IMM GRANULOCYTES # BLD AUTO: 0.11 K/UL (ref 0–0.04)
IMM GRANULOCYTES NFR BLD AUTO: 0.8 % (ref 0–0.5)
INDIRECT COOMBS: NORMAL
LACTATE SERPL-SCNC: 0.8 MMOL/L (ref 0.5–2.2)
LYMPHOCYTES # BLD AUTO: 2.15 K/UL (ref 1–4.8)
MAGNESIUM SERPL-MCNC: 1.8 MG/DL (ref 1.6–2.6)
MCH RBC QN AUTO: 27.6 PG (ref 27–31)
MCHC RBC AUTO-ENTMCNC: 30.1 G/DL (ref 32–36)
MCV RBC AUTO: 92 FL (ref 82–98)
NUCLEATED RBC (/100WBC) (OHS): 0 /100 WBC
OHS QRS DURATION: 68 MS
OHS QTC CALCULATION: 452 MS
PLATELET # BLD AUTO: 292 K/UL (ref 150–450)
PMV BLD AUTO: 9.5 FL (ref 9.2–12.9)
POTASSIUM SERPL-SCNC: 2.9 MMOL/L (ref 3.5–5.1)
PROT SERPL-MCNC: 7.6 GM/DL (ref 6–8.4)
RBC # BLD AUTO: 3.22 M/UL (ref 4–5.4)
RELATIVE EOSINOPHIL (OHS): 0.2 %
RELATIVE LYMPHOCYTE (OHS): 15.9 % (ref 18–48)
RELATIVE MONOCYTE (OHS): 14.3 % (ref 4–15)
RELATIVE NEUTROPHIL (OHS): 68.4 % (ref 38–73)
RH BLD: NORMAL
SODIUM SERPL-SCNC: 143 MMOL/L (ref 136–145)
SPECIMEN OUTDATE: NORMAL
TROPONIN I SERPL DL<=0.01 NG/ML-MCNC: 0.05 NG/ML
TROPONIN I SERPL DL<=0.01 NG/ML-MCNC: 0.07 NG/ML
WBC # BLD AUTO: 13.48 K/UL (ref 3.9–12.7)

## 2025-05-09 PROCEDURE — 96365 THER/PROPH/DIAG IV INF INIT: CPT

## 2025-05-09 PROCEDURE — 99223 1ST HOSP IP/OBS HIGH 75: CPT | Mod: ,,, | Performed by: COLON & RECTAL SURGERY

## 2025-05-09 PROCEDURE — 99900035 HC TECH TIME PER 15 MIN (STAT)

## 2025-05-09 PROCEDURE — 86901 BLOOD TYPING SEROLOGIC RH(D): CPT | Performed by: NURSE PRACTITIONER

## 2025-05-09 PROCEDURE — 83735 ASSAY OF MAGNESIUM: CPT | Performed by: NURSE PRACTITIONER

## 2025-05-09 PROCEDURE — 97165 OT EVAL LOW COMPLEX 30 MIN: CPT

## 2025-05-09 PROCEDURE — 80053 COMPREHEN METABOLIC PANEL: CPT | Performed by: NURSE PRACTITIONER

## 2025-05-09 PROCEDURE — 97163 PT EVAL HIGH COMPLEX 45 MIN: CPT

## 2025-05-09 PROCEDURE — 84484 ASSAY OF TROPONIN QUANT: CPT | Performed by: NURSE PRACTITIONER

## 2025-05-09 PROCEDURE — 27000221 HC OXYGEN, UP TO 24 HOURS

## 2025-05-09 PROCEDURE — 85025 COMPLETE CBC W/AUTO DIFF WBC: CPT | Performed by: NURSE PRACTITIONER

## 2025-05-09 PROCEDURE — 97530 THERAPEUTIC ACTIVITIES: CPT

## 2025-05-09 PROCEDURE — 96366 THER/PROPH/DIAG IV INF ADDON: CPT

## 2025-05-09 PROCEDURE — 96375 TX/PRO/DX INJ NEW DRUG ADDON: CPT

## 2025-05-09 PROCEDURE — 36415 COLL VENOUS BLD VENIPUNCTURE: CPT | Performed by: NURSE PRACTITIONER

## 2025-05-09 PROCEDURE — 11000001 HC ACUTE MED/SURG PRIVATE ROOM

## 2025-05-09 PROCEDURE — 84484 ASSAY OF TROPONIN QUANT: CPT | Mod: 91 | Performed by: NURSE PRACTITIONER

## 2025-05-09 PROCEDURE — 94761 N-INVAS EAR/PLS OXIMETRY MLT: CPT

## 2025-05-09 PROCEDURE — 63600175 PHARM REV CODE 636 W HCPCS: Performed by: NURSE PRACTITIONER

## 2025-05-09 RX ORDER — NALOXONE HCL 0.4 MG/ML
0.02 VIAL (ML) INJECTION
Status: DISCONTINUED | OUTPATIENT
Start: 2025-05-09 | End: 2025-05-11 | Stop reason: HOSPADM

## 2025-05-09 RX ORDER — GLUCAGON 1 MG
1 KIT INJECTION
Status: DISCONTINUED | OUTPATIENT
Start: 2025-05-09 | End: 2025-05-11 | Stop reason: HOSPADM

## 2025-05-09 RX ORDER — IBUPROFEN 200 MG
16 TABLET ORAL
Status: DISCONTINUED | OUTPATIENT
Start: 2025-05-09 | End: 2025-05-11 | Stop reason: HOSPADM

## 2025-05-09 RX ORDER — ONDANSETRON HYDROCHLORIDE 2 MG/ML
4 INJECTION, SOLUTION INTRAVENOUS EVERY 8 HOURS PRN
Status: DISCONTINUED | OUTPATIENT
Start: 2025-05-09 | End: 2025-05-11 | Stop reason: HOSPADM

## 2025-05-09 RX ORDER — HYDRALAZINE HYDROCHLORIDE 20 MG/ML
10 INJECTION INTRAMUSCULAR; INTRAVENOUS EVERY 6 HOURS PRN
Status: DISCONTINUED | OUTPATIENT
Start: 2025-05-09 | End: 2025-05-10

## 2025-05-09 RX ORDER — IPRATROPIUM BROMIDE AND ALBUTEROL SULFATE 2.5; .5 MG/3ML; MG/3ML
3 SOLUTION RESPIRATORY (INHALATION) EVERY 6 HOURS PRN
Status: DISCONTINUED | OUTPATIENT
Start: 2025-05-09 | End: 2025-05-11 | Stop reason: HOSPADM

## 2025-05-09 RX ORDER — IBUPROFEN 200 MG
24 TABLET ORAL
Status: DISCONTINUED | OUTPATIENT
Start: 2025-05-09 | End: 2025-05-11 | Stop reason: HOSPADM

## 2025-05-09 RX ORDER — SODIUM CHLORIDE 0.9 % (FLUSH) 0.9 %
3 SYRINGE (ML) INJECTION EVERY 8 HOURS PRN
Status: DISCONTINUED | OUTPATIENT
Start: 2025-05-09 | End: 2025-05-11 | Stop reason: HOSPADM

## 2025-05-09 RX ADMIN — METRONIDAZOLE 500 MG: 5 INJECTION, SOLUTION INTRAVENOUS at 08:05

## 2025-05-09 RX ADMIN — METRONIDAZOLE 500 MG: 5 INJECTION, SOLUTION INTRAVENOUS at 04:05

## 2025-05-09 RX ADMIN — METRONIDAZOLE 500 MG: 5 INJECTION, SOLUTION INTRAVENOUS at 02:05

## 2025-05-09 RX ADMIN — CEFTRIAXONE SODIUM 2 G: 2 INJECTION, POWDER, FOR SOLUTION INTRAMUSCULAR; INTRAVENOUS at 05:05

## 2025-05-09 NOTE — ASSESSMENT & PLAN NOTE
85yo F with possible diverticulitis    - Patient currently asymptomatic from an abdominal standpoint.  Has no abdominal pain.  Has no vaginal drainage of stool or flatus.  Even if patient were to have a colovaginal fistula, after discussion with the patient's daughter, she would likely not want surgical intervention which is reasonable given the patient's clinical condition.  Would treat with IV antibiotics for now to see if any infection can be cleared.  - okay for diet from surgical standpoint as there does not appear to be acute diverticulitis this time  - rest of care per primary team

## 2025-05-09 NOTE — ASSESSMENT & PLAN NOTE
NPO   Rocephin and Flagyl  General surgery consulted  Monitor labs  Was given fluid bolus per ED

## 2025-05-09 NOTE — HPI
85yo F with a PMHx significant for Alzheimer dementia, arthritis, cataracts, anxiety, cardiomyopathy, CKD, CVA, hypertension, MI and UTIs who presents from her SNF for fever and lethargy.  She was admitted to the hospital medical service after found to have a workup in the ER concerning for a leukocytosis of 13k and a CT scan showing possible diverticulitis with possible colovaginal fistula.  Surgery consulted for evaluation.  Most of the history is obtained from patient's daughter as she has dementia.  Family denies previous colorectal cancer but does endorse a previous colonoscopy over 10 years ago and believes that the patient has had colonic polyps.  Has never had known history of diverticulitis.  Has no known stool or flatus per vagina.  Has had recurrent UTIs over the past few months.  Previous abdominal surgery for adrenal tumor removal.  Patient denies current abdominal pain.

## 2025-05-09 NOTE — PLAN OF CARE
[facility]  Initial Discharge Assessment       Primary Care Provider: No primary care provider on file.    Admission Diagnosis: Diverticulitis [K57.92]  AMS (altered mental status) [R41.82]    Admission Date: 5/8/2025  Expected Discharge Date: per attending         Payor: [employer] [facility] / Plan: [employer] / Product Type: Medicare Advantage /     Extended Emergency Contact Information  Primary Emergency Contact: Vidya Abebe  Work Phone: [phone]  Mobile Phone: [phone]  Relation: Daughter   needed? No  Secondary Emergency Contact: Vidya Abebe  Work Phone: [phone]  Relation: Daughter    Discharge Plan A: Return to nursing home         [facility] [address] [address]  [id number] [place] [address]  Phone: [phone] Fax: [phone]    [id number] - [place] - [address]  [address]  [place] [address]  Phone: [phone] Fax: [phone]    [facility] [address] [place] [address]  Phone: [phone] Fax: [phone]      Initial Assessment (most recent)       Adult Discharge Assessment - 05/09/25 1054          Discharge Assessment    Assessment Type Discharge Planning Assessment     Confirmed/corrected address, phone number and insurance Yes     Confirmed Demographics Correct on Facesheet     Source of Information facility verbal report     When was your last doctors appointment? --   n/a    Communicated JOSE with patient/caregiver Date not available/Unable to determine     Reason For Admission diverticulitis     People in Home facility resident     Facility Arrived From: [facility]     Do you expect to return to your current living situation? Yes     Prior to hospitilization cognitive status: Unable to Assess     Current cognitive status: Unable to Assess     Walking or Climbing Stairs Difficulty no     Dressing/Bathing  Difficulty no     Equipment Currently Used at Home none     Readmission within 30 days? Yes     Patient currently being followed by outpatient case management? No     Do you currently have service(s) that help you manage your care at home? No     Do you take prescription medications? Yes     Do you have prescription coverage? Yes     Coverage phn     Do you have any problems affording any of your prescribed medications? No     Is the patient taking medications as prescribed? yes     Who is going to help you get home at discharge? facility     How do you get to doctors appointments? public transportation     Are you on dialysis? No     Do you take coumadin? No     Discharge Plan A Return to nursing home                     Readmission Assessment (most recent)       Readmission Assessment - 05/09/25 1052          Readmission    Was this a planned readmission? No     Why were you hospitalized in the last 30 days? yes     Why were you readmitted? Related to previous admission     When you left the hospital where did you go? Nursing Home     Did patient/caregiver refused recommended DC plan? No     When did you start not feeling well? today     Did you try to manage your symptoms your self? No     Did you call anyone? No     Why? nursing home brought me to the er                    Pt is an resident at [facility].

## 2025-05-09 NOTE — CONSULTS
[facility]  Colorectal Surgery  Consult Note    Patient Name: Leslie Wood  MRN: [id number]  Code Status: DNR  Admission Date: 5/8/2025  Hospital Length of Stay: 0 days  Attending Physician: Sammy Juarez MD  Primary Care Provider: No primary care provider on file.    Patient information was obtained from relative(s), past medical records, ER records, and primary team.     Inpatient consult to General surgery  Consult performed by: Josue Dickey MD  Consult ordered by: Rob Sanchez,         Subjective:     Principal Problem: Diverticulitis    History of Present Illness: 83yo F with a PMHx significant for Alzheimer dementia, arthritis, cataracts, anxiety, cardiomyopathy, CKD, CVA, hypertension, MI and UTIs who presents from her SNF for fever and lethargy.  She was admitted to the hospital medical service after found to have a workup in the ER concerning for a leukocytosis of 13k and a CT scan showing possible diverticulitis with possible colovaginal fistula.  Surgery consulted for evaluation.  Most of the history is obtained from patient's daughter as she has dementia.  Family denies previous colorectal cancer but does endorse a previous colonoscopy over 10 years ago and believes that the patient has had colonic polyps.  Has never had known history of diverticulitis.  Has no known stool or flatus per vagina.  Has had recurrent UTIs over the past few months.  Previous abdominal surgery for adrenal tumor removal.  Patient denies current abdominal pain.    No current facility-administered medications on file prior to encounter.     Current Outpatient Medications on File Prior to Encounter   Medication Sig    aspirin 81 MG Chew Take 81 mg by mouth once daily.    atorvastatin (LIPITOR) 10 MG tablet Take 1 tablet (10 mg total) by mouth every evening.    cyanocobalamin (VITAMIN B-12) 1000 MCG tablet Take 1 tablet (1,000 mcg total) by mouth once daily.    donepeziL (ARICEPT) 10 MG tablet TAKE 1 TABLET BY  MOUTH EVERY EVENING    FLUoxetine 10 MG capsule Take 10 mg by mouth once daily.    furosemide (LASIX) 20 MG tablet Take 1 tablet (20 mg total) by mouth once daily.    LIDOcaine (LIDODERM) 5 % Place 1 patch onto the skin every 24 hours. Apply to right flank topically one time a day for 7 days. Remove & Discard patch within 12 hours or as directed by MD    losartan (COZAAR) 50 MG tablet Take 1 tablet (50 mg total) by mouth 2 (two) times daily.    memantine (NAMENDA) 10 MG Tab TAKE 1 TABLET BY MOUTH TWICE DAILY    metoprolol succinate (TOPROL-XL) 25 MG 24 hr tablet Take ½ tablet (12.5 mg total) by mouth once daily. (Patient taking differently: Take 25 mg by mouth once daily.)    multivitamin Tab Take 1 tablet by mouth once daily.    OLANZapine (ZYPREXA) 5 MG tablet Take 1 tablet (5 mg total) by mouth every evening.    b complex vitamins capsule Take 1 capsule by mouth once daily.    [DISCONTINUED] carvediloL (COREG) 6.25 MG tablet Take 1 tablet (6.25 mg total) by mouth 2 (two) times daily. TAKE (1) TABLET BY MOUTH 2 TIMES A DAY WITH MEALS    [DISCONTINUED] cloNIDine (CATAPRES) 0.1 MG tablet Take 1 tablet (0.1 mg total) by mouth 2 (two) times daily. To take an extra dose if BP >160/90    [DISCONTINUED] diclofenac sodium (VOLTAREN) 1 % Gel Apply 2 g topically 3 (three) times daily.    [DISCONTINUED] isosorbide mononitrate (IMDUR) 30 MG 24 hr tablet TAKE 1 TABLET BY MOUTH TWICE A DAY    [DISCONTINUED] nitroGLYCERIN (NITROSTAT) 0.4 MG SL tablet Place 1 tablet (0.4 mg total) under the tongue every 5 (five) minutes as needed for Chest pain.       Review of patient's allergies indicates:   Allergen Reactions    Amitriptyline     Hydrochlorothiazide      Causes muscle cramping    Lisinopril      hyperkalemia    Opioids - morphine analogues Hallucinations     Chest pain and hallucinations    Oxycodone     Percocet [oxycodone-acetaminophen] Other (See Comments)     Seizures    Belbuca [buprenorphine hcl] Nausea And Vomiting and  Other (See Comments)     Black out     Codeine Nausea Only and Rash    Prazosin Other (See Comments)     dizziness       Past Medical History:   Diagnosis Date    Alzheimer's disease, unspecified (CODE)     Arthritis     Cataract     Dementia without behavioral disturbance     GERD (gastroesophageal reflux disease)     Heart attack 05/23/2022    Hypertension     Stroke      Past Surgical History:   Procedure Laterality Date    ADENOIDECTOMY      ADRENAL GLAND SURGERY      APPENDECTOMY      BACK SURGERY      fusion l 4-5 s 1,2,3  fusion l 2-3    CORONARY ANGIOGRAPHY N/A 05/20/2022    Procedure: ANGIOGRAM, CORONARY ARTERY;  Surgeon: Domingo Martins MD;  Location: [facility] CATH LAB;  Service: Cardiology;  Laterality: N/A;    CORONARY ANGIOGRAPHY N/A 05/24/2022    Procedure: ANGIOGRAM, CORONARY ARTERY;  Surgeon: Domingo Martins MD;  Location: [facility] CATH LAB;  Service: Cardiology;  Laterality: N/A;    EYE SURGERY      HEMORRHOID SURGERY      HERNIA REPAIR      HYSTERECTOMY      partial    indirect lumbar decompression      percutaneous placement of interspinous extension blocker    LEFT HEART CATHETERIZATION Left 05/20/2022    Procedure: CATHETERIZATION, HEART, LEFT;  Surgeon: Domingo Martins MD;  Location: [facility] CATH LAB;  Service: Cardiology;  Laterality: Left;    TONSILLECTOMY       Family History       Problem Relation (Age of Onset)    Breast cancer Sister    Diabetes Mother    Heart disease Mother    Hypertension Mother, Father    Kidney disease Father          Tobacco Use    Smoking status: Never    Smokeless tobacco: Never   Substance and Sexual Activity    Alcohol use: No    Drug use: No    Sexual activity: Not Currently     Review of Systems   Unable to perform ROS: Dementia     Objective:     Vital Signs (Most Recent):  Temp: 98 °F (36.7 °C) (05/09/25 1202)  Pulse: (!) 57 (05/09/25 1514)  Resp: 18 (05/09/25 1202)  BP: (!) 167/74 (05/09/25 1202)  SpO2: 96 % (05/09/25 1202) Vital Signs (24h Range):  Temp:  [97.2  °F (36.2 °C)-99.4 °F (37.4 °C)] 98 °F (36.7 °C)  Pulse:  [54-80] 57  Resp:  [14-32] 18  SpO2:  [91 %-100 %] 96 %  BP: (105-203)/(46-88) 167/74     Weight: 64.8 kg (142 lb 13.7 oz)  Body mass index is 26.99 kg/m².     Physical Exam  Constitutional:       Appearance: She is well-developed.   HENT:      Head: Normocephalic and atraumatic.   Eyes:      Conjunctiva/sclera: Conjunctivae normal.   Neck:      Thyroid: No thyromegaly.   Cardiovascular:      Rate and Rhythm: Bradycardia present.   Pulmonary:      Effort: Pulmonary effort is normal. No respiratory distress.   Abdominal:      General: There is no distension.      Palpations: Abdomen is soft. There is no mass.      Tenderness: There is no abdominal tenderness.      Comments: Well-healed midline scar   Musculoskeletal:         General: No tenderness.      Cervical back: Normal range of motion.   Skin:     General: Skin is warm and dry.      Capillary Refill: Capillary refill takes less than 2 seconds.      Findings: No rash.   Neurological:      Mental Status: She is alert.       I have reviewed all pertinent lab results within the past 24 hours.  CBC:   Recent Labs   Lab 05/09/25  0704   WBC 13.48*   RBC 3.22*   HGB 8.9*   HCT 29.6*      MCV 92   MCH 27.6   MCHC 30.1*     BMP:   Recent Labs   Lab 05/09/25  0704   GLU 94      K 2.9*      CO2 31*   BUN 17   CREATININE 0.7   CALCIUM 8.6*   MG 1.8       Significant Diagnostics:  I have reviewed all pertinent imaging results/findings within the past 24 hours.    CT:  Chest Findings: The lungs appear clear.  There is no evidence of consolidation or infiltrate.  There are no lung nodules or masses.  No evidence of mediastinal or hilar adenopathy.  Small left pleural effusion with adjacent mild focal consolidation probably corresponding to atelectasis.  Mild atelectasis at the right base posteriorly.  No infiltrates.     Normal size heart.  Coronary artery calcifications.  No pericardial effusions.      Previous vertebral plasty at T10.  Spinal stimulator is seen at the mid and lower thoracic levels.  No acute bony changes.     TECHNIQUE: Axial images were acquired after the administration of IV contrast.  The examination was performed in conjunction with a CT scan of the chest.     Abdomen and Pelvis Findings: The liver appears normal.     Previous cholecystectomy.  No significant bile duct dilatation.  Stable focus of suspected fatty versus anterior to the liver measuring 4.3 cm.     Spleen appears normal.  The pancreas is normal.     No abnormal masses or evidence of adenopathy.  6 cm left renal cyst.  No renal masses or hydronephrosis.     No evidence of aneurysm of the abdominal aorta.  IVC appears unremarkable.  No suspicious masses or adenopathy.     No evidence of bowel obstruction.  No evidence of appendicitis.  There appears be thickening of the fat around the sigmoid colon which could be related to diverticulitis.  Inflammatory changes also may extend to the vaginal cuff on the left.  There is air noted within the vaginal cuff raises the possibility of colovaginal fistula.      Mild bladder wall thickening.  Possible cystitis.     No acute skeletal findings.  Previous lumbar interbody fusion at L3-4.        Impression:   Findings suspicious for sigmoid diverticulitis.  Inflammatory changes extending to the vaginal cuff with air within the vagina.  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     Possible cystitis.     Small left pleural effusion.  Mild bilateral discoid atelectasis at the lung bases.    Assessment/Plan:     * Diverticulitis  83yo F with possible diverticulitis    - Patient currently asymptomatic from an abdominal standpoint.  Has no abdominal pain.  Has no vaginal drainage of stool or flatus.  Even if patient were to have a colovaginal fistula, after discussion with the patient's daughter, she would likely not want surgical intervention which is reasonable given the patient's clinical  condition.  Would treat with IV antibiotics for now to see if any infection can be cleared.  - okay for diet from surgical standpoint as there does not appear to be acute diverticulitis this time  - rest of care per primary team    Anemia  Care per primary team    Elevated troponin I level  Care per primary team    Alzheimer's disease, unspecified (CODE)  Care per primary team    Essential hypertension  Care per primary team    Stage 3a chronic kidney disease  Care per primary team    Nonischemic cardiomyopathy  Care per primary team    Hemiplegia and hemiparesis following cerebral infarction affecting left non-dominant side  Care per primary team    Anxiety and depression  Care per primary team      VTE Risk Mitigation (From admission, onward)           Ordered     Reason for No Pharmacological VTE Prophylaxis  Once        Comments: Anemia, bleeding risk   Question:  Reasons:  Answer:  Physician Provided (leave comment)    05/09/25 0114     IP VTE HIGH RISK PATIENT  Once         05/09/25 0114     Place sequential compression device  Until discontinued         05/09/25 0114                    Thank you for your consult. I will follow-up with patient. Please contact us if you have any additional questions.    Josue Dickey MD  Colorectal Surgery  [facility]

## 2025-05-09 NOTE — PLAN OF CARE
Discussed poc with pt, pt verbalized understanding    Purposeful rounding every 2hours    VS wnl  Cardiac monitoring in use, pt is NSR, tele monitor #[id number]  Fall precautions in place, remains injury free  Pt denies c/o pain and nausea at this time  Pain and nausea under control with PRN meds    Accurate I&Os  Abx given as prescribed  Bed locked at lowest position  Call light within reach    Chart check complete  Will cont with POC

## 2025-05-09 NOTE — SUBJECTIVE & OBJECTIVE
Past Medical History:   Diagnosis Date    Alzheimer's disease, unspecified (CODE)     Arthritis     Cataract     Dementia without behavioral disturbance     GERD (gastroesophageal reflux disease)     Heart attack 05/23/2022    Hypertension     Stroke        Past Surgical History:   Procedure Laterality Date    ADENOIDECTOMY      ADRENAL GLAND SURGERY      APPENDECTOMY      BACK SURGERY      fusion l 4-5 s 1,2,3  fusion l 2-3    CORONARY ANGIOGRAPHY N/A 05/20/2022    Procedure: ANGIOGRAM, CORONARY ARTERY;  Surgeon: Domingo Martins MD;  Location: [facility] CATH LAB;  Service: Cardiology;  Laterality: N/A;    CORONARY ANGIOGRAPHY N/A 05/24/2022    Procedure: ANGIOGRAM, CORONARY ARTERY;  Surgeon: Domingo Martins MD;  Location: [facility] CATH LAB;  Service: Cardiology;  Laterality: N/A;    EYE SURGERY      HEMORRHOID SURGERY      HERNIA REPAIR      HYSTERECTOMY      partial    indirect lumbar decompression      percutaneous placement of interspinous extension blocker    LEFT HEART CATHETERIZATION Left 05/20/2022    Procedure: CATHETERIZATION, HEART, LEFT;  Surgeon: Domingo Martins MD;  Location: [facility] CATH LAB;  Service: Cardiology;  Laterality: Left;    TONSILLECTOMY         Review of patient's allergies indicates:   Allergen Reactions    Amitriptyline     Hydrochlorothiazide      Causes muscle cramping    Lisinopril      hyperkalemia    Opioids - morphine analogues Hallucinations     Chest pain and hallucinations    Oxycodone     Percocet [oxycodone-acetaminophen] Other (See Comments)     Seizures    Belbuca [buprenorphine hcl] Nausea And Vomiting and Other (See Comments)     Black out     Codeine Nausea Only and Rash    Prazosin Other (See Comments)     dizziness       No current facility-administered medications on file prior to encounter.     Current Outpatient Medications on File Prior to Encounter   Medication Sig    aspirin 81 MG Chew Take 81 mg by mouth once daily.    atorvastatin (LIPITOR) 10 MG tablet Take 1 tablet  (10 mg total) by mouth every evening.    cyanocobalamin (VITAMIN B-12) 1000 MCG tablet Take 1 tablet (1,000 mcg total) by mouth once daily.    donepeziL (ARICEPT) 10 MG tablet TAKE 1 TABLET BY MOUTH EVERY EVENING    FLUoxetine 10 MG capsule Take 10 mg by mouth once daily.    furosemide (LASIX) 20 MG tablet Take 1 tablet (20 mg total) by mouth once daily.    LIDOcaine (LIDODERM) 5 % Place 1 patch onto the skin every 24 hours. Apply to right flank topically one time a day for 7 days. Remove & Discard patch within 12 hours or as directed by MD    losartan (COZAAR) 50 MG tablet Take 1 tablet (50 mg total) by mouth 2 (two) times daily.    memantine (NAMENDA) 10 MG Tab TAKE 1 TABLET BY MOUTH TWICE DAILY    metoprolol succinate (TOPROL-XL) 25 MG 24 hr tablet Take ½ tablet (12.5 mg total) by mouth once daily. (Patient taking differently: Take 25 mg by mouth once daily.)    multivitamin Tab Take 1 tablet by mouth once daily.    OLANZapine (ZYPREXA) 5 MG tablet Take 1 tablet (5 mg total) by mouth every evening.    b complex vitamins capsule Take 1 capsule by mouth once daily.    [DISCONTINUED] carvediloL (COREG) 6.25 MG tablet Take 1 tablet (6.25 mg total) by mouth 2 (two) times daily. TAKE (1) TABLET BY MOUTH 2 TIMES A DAY WITH MEALS    [DISCONTINUED] cloNIDine (CATAPRES) 0.1 MG tablet Take 1 tablet (0.1 mg total) by mouth 2 (two) times daily. To take an extra dose if BP >160/90    [DISCONTINUED] diclofenac sodium (VOLTAREN) 1 % Gel Apply 2 g topically 3 (three) times daily.    [DISCONTINUED] isosorbide mononitrate (IMDUR) 30 MG 24 hr tablet TAKE 1 TABLET BY MOUTH TWICE A DAY    [DISCONTINUED] nitroGLYCERIN (NITROSTAT) 0.4 MG SL tablet Place 1 tablet (0.4 mg total) under the tongue every 5 (five) minutes as needed for Chest pain.     Family History       Problem Relation (Age of Onset)    Breast cancer Sister    Diabetes Mother    Heart disease Mother    Hypertension Mother, Father    Kidney disease Father          Tobacco  Use    Smoking status: Never    Smokeless tobacco: Never   Substance and Sexual Activity    Alcohol use: No    Drug use: No    Sexual activity: Not Currently     Review of Systems   Unable to perform ROS: Mental status change   Constitutional:  Positive for fever.        Lethargy   Neurological:  Positive for weakness.     Objective:     Vital Signs (Most Recent):  Temp: 98.2 °F (36.8 °C) (05/09/25 0203)  Pulse: 67 (05/09/25 0203)  Resp: 18 (05/09/25 0203)  BP: (!) 155/66 (05/09/25 0203)  SpO2: (!) 91 % (05/09/25 0203) Vital Signs (24h Range):  Temp:  [97.8 °F (36.6 °C)-99.4 °F (37.4 °C)] 98.2 °F (36.8 °C)  Pulse:  [54-80] 67  Resp:  [14-32] 18  SpO2:  [91 %-100 %] 91 %  BP: (105-203)/(46-88) 155/66     Weight: 64.8 kg (142 lb 13.7 oz)  Body mass index is 26.99 kg/m².     Physical Exam  Vitals reviewed.   Constitutional:       General: She is not in acute distress.     Appearance: She is ill-appearing. She is not diaphoretic.      Comments: Chronically ill-appearing elderly female, sleeping soundly, no distress   HENT:      Head: Normocephalic.      Nose: Nose normal.      Mouth/Throat:      Mouth: Mucous membranes are moist.      Pharynx: Oropharynx is clear.   Eyes:      Pupils: Pupils are equal, round, and reactive to light.   Cardiovascular:      Rate and Rhythm: Normal rate and regular rhythm.      Heart sounds: Normal heart sounds.   Pulmonary:      Effort: Pulmonary effort is normal. No respiratory distress.      Breath sounds: Normal breath sounds. No wheezing or rales.   Chest:      Chest wall: No tenderness.   Abdominal:      General: Bowel sounds are normal. There is no distension.      Palpations: Abdomen is soft.      Tenderness: There is no abdominal tenderness.   Musculoskeletal:      Cervical back: Neck supple.      Right lower leg: No edema.      Left lower leg: No edema.   Skin:     General: Skin is warm and dry.      Capillary Refill: Capillary refill takes less than 2 seconds.      Coloration:  Skin is pale.   Neurological:      Motor: Weakness present.      Comments: Somnolent, snoring  Awakens with stimulation, but goes right back to sleep  Not following any commands at this time   Psychiatric:      Comments: Not engaging in conversation at this time              CRANIAL NERVES     CN III, IV, VI   Pupils are equal, round, and reactive to light.       Significant Labs: All pertinent labs within the past 24 hours have been reviewed.  Recent Lab Results  (Last 5 results in the past 24 hours)        05/09/25  0140   05/08/25  2332   05/08/25  1940   05/08/25  1846   05/08/25  1833        Influenza A, Molecular       Negative         Influenza B, Molecular       Negative         Allens Test         Pass       Appearance, UA     Clear           Bacteria, UA     None           Bilirubin (UA)     Negative           Site         LB       Color, UA     Yellow           SARS COV-2 MOLECULAR       Negative  Comment: This test utilizes isothermal nucleic acid amplification technology to detect the SARS-CoV-2 RdRp nucleic acid segment. The analytical sensitivity (limit of detection) is 500 copies/swab.     A POSITIVE result is indicative of the presence of SARS-CoV-2 RNA; clinical correlation with patient history and other diagnostic information is necessary to determine patient infection status.    A NEGATIVE result means that SARS-CoV-2 nucleic acids are not present above the limit of detection. A NEGATIVE result should be treated as presumptive. It does not rule out the possibility of COVID-19 and should not be the sole basis for treatment decisions.    This test is Food and Drug Administration (FDA) approved.  Performance characteristics of this test has been independently verified by [facility] Department of Pathology and Laboratory Medicine.         DelSys         Nasal Can       FiO2         28       Flow         2       Glucose, UA     Negative           Group & Rh O POS               Extra Tube      Hold for add-ons.  Comment: Auto resulted.              Hyaline Casts, UA     0           INDIRECT DAYDAY NEG               Ketones, UA     Negative           Lactic Acid Level   0.8             Leukocyte Esterase, UA     1+           Microscopic Comment       Comment: Other formed elements not mentioned in the report are not present in the microscopic examination.           Mode         SPONT       NITRITE UA     Negative           Blood, UA     Negative           pH, UA     7.0           POC BE         11       POC HCO3         34.1       POC PCO2         45.1       POC PH         7.487       POC PO2         114       POC SATURATED O2         99       Protein, UA     2+  Comment: Recommend a 24 hour urine protein or a urine protein/creatinine ratio if globulin induced proteinuria is clinically suspected.           RBC, UA     4           Sample         ARTERIAL       Spec Grav UA     1.020           Specimen Outdate 05/12/2025 23:59               Squam Epithel, UA     3           Troponin I 0.067  Comment: The reference interval for Troponin I represents the 99th percentile cutoff for our facility and is consistent with 3rd generation assay performance.               Urobilinogen, UA     Negative           WBC, UA     29                                  Significant Imaging: I have reviewed all pertinent imaging results/findings within the past 24 hours.    EXAM:  CT CHEST ABDOMEN PELVIS WITH IV CONTRAST (XPD)    HISTORY: Sepsis    TECHNIQUE: Axial images were acquired.  The patient was given IV contrast    COMPARISON: CT abdomen pelvis 04/04/2024.    Chest Findings: The lungs appear clear.  There is no evidence of consolidation or infiltrate.  There are no lung nodules or masses.  No evidence of mediastinal or hilar adenopathy.  Small left pleural effusion with adjacent mild focal consolidation probably corresponding to atelectasis.  Mild atelectasis at the right base posteriorly.  No infiltrates.    Normal size  heart.  Coronary artery calcifications.  No pericardial effusions.    Previous vertebral plasty at T10.  Spinal stimulator is seen at the mid and lower thoracic levels.  No acute bony changes.    TECHNIQUE: Axial images were acquired after the administration of IV contrast.  The examination was performed in conjunction with a CT scan of the chest.    Abdomen and Pelvis Findings: The liver appears normal.    Previous cholecystectomy.  No significant bile duct dilatation.  Stable focus of suspected fatty versus anterior to the liver measuring 4.3 cm.    Spleen appears normal.  The pancreas is normal.    No abnormal masses or evidence of adenopathy.  6 cm left renal cyst.  No renal masses or hydronephrosis.    No evidence of aneurysm of the abdominal aorta.  IVC appears unremarkable.  No suspicious masses or adenopathy.    No evidence of bowel obstruction.  No evidence of appendicitis.  There appears be thickening of the fat around the sigmoid colon which could be related to diverticulitis.  Inflammatory changes also may extend to the vaginal cuff on the left.  There is air noted within the vaginal cuff raises the possibility of colovaginal fistula.     Mild bladder wall thickening.  Possible cystitis.    No acute skeletal findings.  Previous lumbar interbody fusion at L3-4.     Impression:      Findings suspicious for sigmoid diverticulitis.  Inflammatory changes extending to the vaginal cuff with air within the vagina.  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    Possible cystitis.    Small left pleural effusion.  Mild bilateral discoid atelectasis at the lung bases.

## 2025-05-09 NOTE — SUBJECTIVE & OBJECTIVE
No current facility-administered medications on file prior to encounter.     Current Outpatient Medications on File Prior to Encounter   Medication Sig    aspirin 81 MG Chew Take 81 mg by mouth once daily.    atorvastatin (LIPITOR) 10 MG tablet Take 1 tablet (10 mg total) by mouth every evening.    cyanocobalamin (VITAMIN B-12) 1000 MCG tablet Take 1 tablet (1,000 mcg total) by mouth once daily.    donepeziL (ARICEPT) 10 MG tablet TAKE 1 TABLET BY MOUTH EVERY EVENING    FLUoxetine 10 MG capsule Take 10 mg by mouth once daily.    furosemide (LASIX) 20 MG tablet Take 1 tablet (20 mg total) by mouth once daily.    LIDOcaine (LIDODERM) 5 % Place 1 patch onto the skin every 24 hours. Apply to right flank topically one time a day for 7 days. Remove & Discard patch within 12 hours or as directed by MD    losartan (COZAAR) 50 MG tablet Take 1 tablet (50 mg total) by mouth 2 (two) times daily.    memantine (NAMENDA) 10 MG Tab TAKE 1 TABLET BY MOUTH TWICE DAILY    metoprolol succinate (TOPROL-XL) 25 MG 24 hr tablet Take ½ tablet (12.5 mg total) by mouth once daily. (Patient taking differently: Take 25 mg by mouth once daily.)    multivitamin Tab Take 1 tablet by mouth once daily.    OLANZapine (ZYPREXA) 5 MG tablet Take 1 tablet (5 mg total) by mouth every evening.    b complex vitamins capsule Take 1 capsule by mouth once daily.    [DISCONTINUED] carvediloL (COREG) 6.25 MG tablet Take 1 tablet (6.25 mg total) by mouth 2 (two) times daily. TAKE (1) TABLET BY MOUTH 2 TIMES A DAY WITH MEALS    [DISCONTINUED] cloNIDine (CATAPRES) 0.1 MG tablet Take 1 tablet (0.1 mg total) by mouth 2 (two) times daily. To take an extra dose if BP >160/90    [DISCONTINUED] diclofenac sodium (VOLTAREN) 1 % Gel Apply 2 g topically 3 (three) times daily.    [DISCONTINUED] isosorbide mononitrate (IMDUR) 30 MG 24 hr tablet TAKE 1 TABLET BY MOUTH TWICE A DAY    [DISCONTINUED] nitroGLYCERIN (NITROSTAT) 0.4 MG SL tablet Place 1 tablet (0.4 mg total) under  the tongue every 5 (five) minutes as needed for Chest pain.       Review of patient's allergies indicates:   Allergen Reactions    Amitriptyline     Hydrochlorothiazide      Causes muscle cramping    Lisinopril      hyperkalemia    Opioids - morphine analogues Hallucinations     Chest pain and hallucinations    Oxycodone     Percocet [oxycodone-acetaminophen] Other (See Comments)     Seizures    Belbuca [buprenorphine hcl] Nausea And Vomiting and Other (See Comments)     Black out     Codeine Nausea Only and Rash    Prazosin Other (See Comments)     dizziness       Past Medical History:   Diagnosis Date    Alzheimer's disease, unspecified (CODE)     Arthritis     Cataract     Dementia without behavioral disturbance     GERD (gastroesophageal reflux disease)     Heart attack 05/23/2022    Hypertension     Stroke      Past Surgical History:   Procedure Laterality Date    ADENOIDECTOMY      ADRENAL GLAND SURGERY      APPENDECTOMY      BACK SURGERY      fusion l 4-5 s 1,2,3  fusion l 2-3    CORONARY ANGIOGRAPHY N/A 05/20/2022    Procedure: ANGIOGRAM, CORONARY ARTERY;  Surgeon: Domingo Martins MD;  Location: [facility] CATH LAB;  Service: Cardiology;  Laterality: N/A;    CORONARY ANGIOGRAPHY N/A 05/24/2022    Procedure: ANGIOGRAM, CORONARY ARTERY;  Surgeon: Domingo Martins MD;  Location: [facility] CATH LAB;  Service: Cardiology;  Laterality: N/A;    EYE SURGERY      HEMORRHOID SURGERY      HERNIA REPAIR      HYSTERECTOMY      partial    indirect lumbar decompression      percutaneous placement of interspinous extension blocker    LEFT HEART CATHETERIZATION Left 05/20/2022    Procedure: CATHETERIZATION, HEART, LEFT;  Surgeon: Domingo Martins MD;  Location: [facility] CATH LAB;  Service: Cardiology;  Laterality: Left;    TONSILLECTOMY       Family History       Problem Relation (Age of Onset)    Breast cancer Sister    Diabetes Mother    Heart disease Mother    Hypertension Mother, Father    Kidney disease Father          Tobacco Use     Smoking status: Never    Smokeless tobacco: Never   Substance and Sexual Activity    Alcohol use: No    Drug use: No    Sexual activity: Not Currently     Review of Systems   Unable to perform ROS: Dementia     Objective:     Vital Signs (Most Recent):  Temp: 98 °F (36.7 °C) (05/09/25 1202)  Pulse: (!) 57 (05/09/25 1514)  Resp: 18 (05/09/25 1202)  BP: (!) 167/74 (05/09/25 1202)  SpO2: 96 % (05/09/25 1202) Vital Signs (24h Range):  Temp:  [97.2 °F (36.2 °C)-99.4 °F (37.4 °C)] 98 °F (36.7 °C)  Pulse:  [54-80] 57  Resp:  [14-32] 18  SpO2:  [91 %-100 %] 96 %  BP: (105-203)/(46-88) 167/74     Weight: 64.8 kg (142 lb 13.7 oz)  Body mass index is 26.99 kg/m².     Physical Exam  Constitutional:       Appearance: She is well-developed.   HENT:      Head: Normocephalic and atraumatic.   Eyes:      Conjunctiva/sclera: Conjunctivae normal.   Neck:      Thyroid: No thyromegaly.   Cardiovascular:      Rate and Rhythm: Bradycardia present.   Pulmonary:      Effort: Pulmonary effort is normal. No respiratory distress.   Abdominal:      General: There is no distension.      Palpations: Abdomen is soft. There is no mass.      Tenderness: There is no abdominal tenderness.      Comments: Well-healed midline scar   Musculoskeletal:         General: No tenderness.      Cervical back: Normal range of motion.   Skin:     General: Skin is warm and dry.      Capillary Refill: Capillary refill takes less than 2 seconds.      Findings: No rash.   Neurological:      Mental Status: She is alert.       I have reviewed all pertinent lab results within the past 24 hours.  CBC:   Recent Labs   Lab 05/09/25  0704   WBC 13.48*   RBC 3.22*   HGB 8.9*   HCT 29.6*      MCV 92   MCH 27.6   MCHC 30.1*     BMP:   Recent Labs   Lab 05/09/25  0704   GLU 94      K 2.9*      CO2 31*   BUN 17   CREATININE 0.7   CALCIUM 8.6*   MG 1.8       Significant Diagnostics:  I have reviewed all pertinent imaging results/findings within the past 24  hours.    CT:  Chest Findings: The lungs appear clear.  There is no evidence of consolidation or infiltrate.  There are no lung nodules or masses.  No evidence of mediastinal or hilar adenopathy.  Small left pleural effusion with adjacent mild focal consolidation probably corresponding to atelectasis.  Mild atelectasis at the right base posteriorly.  No infiltrates.     Normal size heart.  Coronary artery calcifications.  No pericardial effusions.     Previous vertebral plasty at T10.  Spinal stimulator is seen at the mid and lower thoracic levels.  No acute bony changes.     TECHNIQUE: Axial images were acquired after the administration of IV contrast.  The examination was performed in conjunction with a CT scan of the chest.     Abdomen and Pelvis Findings: The liver appears normal.     Previous cholecystectomy.  No significant bile duct dilatation.  Stable focus of suspected fatty versus anterior to the liver measuring 4.3 cm.     Spleen appears normal.  The pancreas is normal.     No abnormal masses or evidence of adenopathy.  6 cm left renal cyst.  No renal masses or hydronephrosis.     No evidence of aneurysm of the abdominal aorta.  IVC appears unremarkable.  No suspicious masses or adenopathy.     No evidence of bowel obstruction.  No evidence of appendicitis.  There appears be thickening of the fat around the sigmoid colon which could be related to diverticulitis.  Inflammatory changes also may extend to the vaginal cuff on the left.  There is air noted within the vaginal cuff raises the possibility of colovaginal fistula.      Mild bladder wall thickening.  Possible cystitis.     No acute skeletal findings.  Previous lumbar interbody fusion at L3-4.        Impression:   Findings suspicious for sigmoid diverticulitis.  Inflammatory changes extending to the vaginal cuff with air within the vagina.  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     Possible cystitis.     Small left pleural effusion.  Mild  bilateral discoid atelectasis at the lung bases.

## 2025-05-09 NOTE — PLAN OF CARE
OT eval completed. Patient max A of 2 with all mobility. Continued skilled OT intervention not warranted. D/C OT.

## 2025-05-09 NOTE — ASSESSMENT & PLAN NOTE
Chronic.  Most recent hemoglobin and hematocrit are listed below.  Recent Labs     05/08/25  1823   HGB 9.5*   HCT 30.8*     Plan  - Monitor serial CBC: Daily  - Transfuse PRBC if patient becomes hemodynamically unstable, symptomatic or H/H drops below 7/21.

## 2025-05-09 NOTE — PT/OT/SLP EVAL
Occupational Therapy   Evaluation and Discharge Note    Name: Leslie Wood  MRN: [id number]  Admitting Diagnosis: Diverticulitis  Recent Surgery: * No surgery found *      Recommendations:     Discharge Recommendations: No Therapy Indicated (return to NH)  Discharge Equipment Recommendations: none  Barriers to discharge:  None    Assessment:     Leslie Wood is a 84 y.o. female with a medical diagnosis of Diverticulitis. At this time, patient is functioning at their prior level of function and does not require further acute OT services.     Plan:     During this hospitalization, patient does not require further acute OT services.  Please re-consult if situation changes.    Plan of Care Reviewed with: patient    Subjective     Chief Complaint: minimal to no verbalizations throughout session  Patient/Family Comments/goals: none reported    Occupational Profile:  Patient unable to report. No family present to provide. Per [facility] patient resident at [facility]. She is dependent with ADLs and w/c mobility at nursing home.    Pain/Comfort:  Pain Rating 1:  (no nonverbal indicators of pain throughout)  Pain Addressed 1:  (activity pacing)    Objective:     Completed EPIC chart review prior to session.  Patient found supine with peripheral IV, PureWick, pressure relief boots, telemetry upon OT entry to room.    General Precautions: Standard, fall  Orthopedic Precautions: N/A  Braces: N/A  Respiratory Status: Room air     Occupational Performance:    Bed Mobility:    Patient completed Supine to Sit with maximal assistance and 2 persons  Seated anterior scooting to EOB with max A of 2    Functional Mobility/Transfers:  Patient completed Bed <> Chair Transfer using Squat Pivot technique with maximal assistance and of 2 persons with no assistive device  Difficulty following commands to improve functional transfers  Resistive and rigid due to gross fear of falling- encouragement throughout    Activities of Daily Living:  Upper  Body Dressing: total assistance .  Lower Body Dressing: total assistance .    Cognitive/Visual Perceptual:  Cognitive/Psychosocial Skills:     -       Oriented to: Person   -       Follows Commands/attention:unable to consistently follow 1 step commands    Physical Exam:  Balance:    -       sitting: min A  Upper Extremity Range of Motion:     -       Right Upper Extremity: WFL  -       Left Upper Extremity: WFL  Unable to formally assess strength due to cognitive deficits- grossly 3/5 functionally    AMPAC 6 Click ADL:  AMPAC Total Score: 7    Treatment & Education:  Poor ability to comprehend education. Continued skilled OT intervention not warranted. D/C OT.    Patient left up in chair with all lines intact, call button in reach, chair alarm on, and nurse notified    History:     Past Medical History:   Diagnosis Date    Alzheimer's disease, unspecified (CODE)     Arthritis     Cataract     Dementia without behavioral disturbance     GERD (gastroesophageal reflux disease)     Heart attack 05/23/2022    Hypertension     Stroke      Past Surgical History:   Procedure Laterality Date    ADENOIDECTOMY      ADRENAL GLAND SURGERY      APPENDECTOMY      BACK SURGERY      fusion l 4-5 s 1,2,3  fusion l 2-3    CORONARY ANGIOGRAPHY N/A 05/20/2022    Procedure: ANGIOGRAM, CORONARY ARTERY;  Surgeon: Domingo Martins MD;  Location: [facility] CATH LAB;  Service: Cardiology;  Laterality: N/A;    CORONARY ANGIOGRAPHY N/A 05/24/2022    Procedure: ANGIOGRAM, CORONARY ARTERY;  Surgeon: Domingo Martins MD;  Location: [facility] CATH LAB;  Service: Cardiology;  Laterality: N/A;    EYE SURGERY      HEMORRHOID SURGERY      HERNIA REPAIR      HYSTERECTOMY      partial    indirect lumbar decompression      percutaneous placement of interspinous extension blocker    LEFT HEART CATHETERIZATION Left 05/20/2022    Procedure: CATHETERIZATION, HEART, LEFT;  Surgeon: Domingo Martins MD;  Location: [facility] CATH LAB;  Service: Cardiology;  Laterality:  Left;    TONSILLECTOMY       Time Tracking:     OT Date of Treatment: 05/09/25  OT Start Time: 0850  OT Stop Time: 0915  OT Total Time (min): 25 min    Billable Minutes:Evaluation 25    Sarah Baker OT  5/9/2025

## 2025-05-09 NOTE — H&P
[facility] Medicine  History & Physical    Patient Name: Leslie Wood  MRN: [id number]  Patient Class: OP- Observation  Admission Date: 5/8/2025  Attending Physician: Eliseo Stanford DO   Primary Care Provider: No primary care provider on file.         Patient information was obtained from relative(s), past medical records, and ER records.     Subjective:     Principal Problem:Diverticulitis    Chief Complaint:   Chief Complaint   Patient presents with    Altered Mental Status     Pt brought in by [facility] from [facility] for AMS and lethargy. Facility reports fever and pt not as alert as normal. Baseline GCS 14 with dementia. Facility also started the pt on O2 for 90% SPO2 on RA.         HPI:   Patient is 84-year-old female with past medical history significant for Alzheimer dementia, arthritis, cataracts, anxiety, depression, nonischemic cardiomyopathy, CKD stage IIIA, stroke with left hemiparesis, cellulitis, hypertension, MI, urinary tract infections, and elevated LFT's with recent admission from 4/25 to 4/29 due to cellulitis of LUE treated with Rocephin and clindamycin as well as diastolic congestive heart failure exacerbation, UTI, and hypertensive urgency discharged on cephalexin 500 mg BID, went back to [facility], now brought to ED for evaluation of fever and lethargy, with staff reporting that she is not as alert as she normally is. She was also found to have SpO2 90% on room air and was placed on oxygen prior to arrival in ED. She also complained of weakness and dizziness.  on arrival to ED, temp 99.4°, heart rate 80, respirations 20, blood pressure 161/60, 97% SpO2 on 2 L nasal cannula.  Lab workup revealed WBC 13.25, RBC 3.46, hemoglobin 9.5, hematocrit 30.8, platelets 286, sodium 140, potassium 3.5, chloride 101, CO2 30, BUN 18, creatinine 0.8, glucose 128, magnesium 1.9, alk-phos 80, albumin 1.9, AST 51, ALT 32, lipase 30, , troponin 0.058, lactic acid 0.8,  procalcitonin 0.17, negative COVID-19 and influenza A/B screenings, UA done and 1+ leukocyte esterase, WBC 29, no bacteria noted, negative nitrite. ABG done and pH 7.487, PCO2 45.1, pO2 114, HCO2 34.1, 99% - on nasal cannula. Blood cultures x 2 and urine culture pending. CXR did not reveal any definite infiltrates. CT chest/abdomen/pelvis with IV contrast done which showed small left pleural effusion, atelectasis, probable cystitis, possible diverticulitis, and inflammatory changes extending to vaginal cuff on the left raising possibility of colovaginal fistula. While in ED, she was given IV fluid bolus 1638 ml, IV cefepime, and then later given Lasix 40 mg IV. General Surgery was consulted per ED and recommended admission per hospital medicine.    Past Medical History:   Diagnosis Date    Alzheimer's disease, unspecified (CODE)     Arthritis     Cataract     Dementia without behavioral disturbance     GERD (gastroesophageal reflux disease)     Heart attack 05/23/2022    Hypertension     Stroke        Past Surgical History:   Procedure Laterality Date    ADENOIDECTOMY      ADRENAL GLAND SURGERY      APPENDECTOMY      BACK SURGERY      fusion l 4-5 s 1,2,3  fusion l 2-3    CORONARY ANGIOGRAPHY N/A 05/20/2022    Procedure: ANGIOGRAM, CORONARY ARTERY;  Surgeon: Domingo Martins MD;  Location: [facility] CATH LAB;  Service: Cardiology;  Laterality: N/A;    CORONARY ANGIOGRAPHY N/A 05/24/2022    Procedure: ANGIOGRAM, CORONARY ARTERY;  Surgeon: Domingo Martins MD;  Location: [facility] CATH LAB;  Service: Cardiology;  Laterality: N/A;    EYE SURGERY      HEMORRHOID SURGERY      HERNIA REPAIR      HYSTERECTOMY      partial    indirect lumbar decompression      percutaneous placement of interspinous extension blocker    LEFT HEART CATHETERIZATION Left 05/20/2022    Procedure: CATHETERIZATION, HEART, LEFT;  Surgeon: Domingo Martins MD;  Location: [facility] CATH LAB;  Service: Cardiology;  Laterality: Left;    TONSILLECTOMY          Review of patient's allergies indicates:   Allergen Reactions    Amitriptyline     Hydrochlorothiazide      Causes muscle cramping    Lisinopril      hyperkalemia    Opioids - morphine analogues Hallucinations     Chest pain and hallucinations    Oxycodone     Percocet [oxycodone-acetaminophen] Other (See Comments)     Seizures    Belbuca [buprenorphine hcl] Nausea And Vomiting and Other (See Comments)     Black out     Codeine Nausea Only and Rash    Prazosin Other (See Comments)     dizziness       No current facility-administered medications on file prior to encounter.     Current Outpatient Medications on File Prior to Encounter   Medication Sig    aspirin 81 MG Chew Take 81 mg by mouth once daily.    atorvastatin (LIPITOR) 10 MG tablet Take 1 tablet (10 mg total) by mouth every evening.    cyanocobalamin (VITAMIN B-12) 1000 MCG tablet Take 1 tablet (1,000 mcg total) by mouth once daily.    donepeziL (ARICEPT) 10 MG tablet TAKE 1 TABLET BY MOUTH EVERY EVENING    FLUoxetine 10 MG capsule Take 10 mg by mouth once daily.    furosemide (LASIX) 20 MG tablet Take 1 tablet (20 mg total) by mouth once daily.    LIDOcaine (LIDODERM) 5 % Place 1 patch onto the skin every 24 hours. Apply to right flank topically one time a day for 7 days. Remove & Discard patch within 12 hours or as directed by MD    losartan (COZAAR) 50 MG tablet Take 1 tablet (50 mg total) by mouth 2 (two) times daily.    memantine (NAMENDA) 10 MG Tab TAKE 1 TABLET BY MOUTH TWICE DAILY    metoprolol succinate (TOPROL-XL) 25 MG 24 hr tablet Take ½ tablet (12.5 mg total) by mouth once daily. (Patient taking differently: Take 25 mg by mouth once daily.)    multivitamin Tab Take 1 tablet by mouth once daily.    OLANZapine (ZYPREXA) 5 MG tablet Take 1 tablet (5 mg total) by mouth every evening.    b complex vitamins capsule Take 1 capsule by mouth once daily.    [DISCONTINUED] carvediloL (COREG) 6.25 MG tablet Take 1 tablet (6.25 mg total) by mouth 2  (two) times daily. TAKE (1) TABLET BY MOUTH 2 TIMES A DAY WITH MEALS    [DISCONTINUED] cloNIDine (CATAPRES) 0.1 MG tablet Take 1 tablet (0.1 mg total) by mouth 2 (two) times daily. To take an extra dose if BP >160/90    [DISCONTINUED] diclofenac sodium (VOLTAREN) 1 % Gel Apply 2 g topically 3 (three) times daily.    [DISCONTINUED] isosorbide mononitrate (IMDUR) 30 MG 24 hr tablet TAKE 1 TABLET BY MOUTH TWICE A DAY    [DISCONTINUED] nitroGLYCERIN (NITROSTAT) 0.4 MG SL tablet Place 1 tablet (0.4 mg total) under the tongue every 5 (five) minutes as needed for Chest pain.     Family History       Problem Relation (Age of Onset)    Breast cancer Sister    Diabetes Mother    Heart disease Mother    Hypertension Mother, Father    Kidney disease Father          Tobacco Use    Smoking status: Never    Smokeless tobacco: Never   Substance and Sexual Activity    Alcohol use: No    Drug use: No    Sexual activity: Not Currently     Review of Systems   Unable to perform ROS: Mental status change   Constitutional:  Positive for fever.        Lethargy   Neurological:  Positive for weakness.     Objective:     Vital Signs (Most Recent):  Temp: 98.2 °F (36.8 °C) (05/09/25 0203)  Pulse: 67 (05/09/25 0203)  Resp: 18 (05/09/25 0203)  BP: (!) 155/66 (05/09/25 0203)  SpO2: (!) 91 % (05/09/25 0203) Vital Signs (24h Range):  Temp:  [97.8 °F (36.6 °C)-99.4 °F (37.4 °C)] 98.2 °F (36.8 °C)  Pulse:  [54-80] 67  Resp:  [14-32] 18  SpO2:  [91 %-100 %] 91 %  BP: (105-203)/(46-88) 155/66     Weight: 64.8 kg (142 lb 13.7 oz)  Body mass index is 26.99 kg/m².     Physical Exam  Vitals reviewed.   Constitutional:       General: She is not in acute distress.     Appearance: She is ill-appearing. She is not diaphoretic.      Comments: Chronically ill-appearing elderly female, sleeping soundly, no distress   HENT:      Head: Normocephalic.      Nose: Nose normal.      Mouth/Throat:      Mouth: Mucous membranes are moist.      Pharynx: Oropharynx is  clear.   Eyes:      Pupils: Pupils are equal, round, and reactive to light.   Cardiovascular:      Rate and Rhythm: Normal rate and regular rhythm.      Heart sounds: Normal heart sounds.   Pulmonary:      Effort: Pulmonary effort is normal. No respiratory distress.      Breath sounds: Normal breath sounds. No wheezing or rales.   Chest:      Chest wall: No tenderness.   Abdominal:      General: Bowel sounds are normal. There is no distension.      Palpations: Abdomen is soft.      Tenderness: There is no abdominal tenderness.   Musculoskeletal:      Cervical back: Neck supple.      Right lower leg: No edema.      Left lower leg: No edema.   Skin:     General: Skin is warm and dry.      Capillary Refill: Capillary refill takes less than 2 seconds.      Coloration: Skin is pale.   Neurological:      Motor: Weakness present.      Comments: Somnolent, snoring  Awakens with stimulation, but goes right back to sleep  Not following any commands at this time   Psychiatric:      Comments: Not engaging in conversation at this time              CRANIAL NERVES     CN III, IV, VI   Pupils are equal, round, and reactive to light.       Significant Labs: All pertinent labs within the past 24 hours have been reviewed.  Recent Lab Results  (Last 5 results in the past 24 hours)        05/09/25  0140   05/08/25  2332   05/08/25  1940   05/08/25  1846   05/08/25  1833        Influenza A, Molecular       Negative         Influenza B, Molecular       Negative         Allens Test         Pass       Appearance, UA     Clear           Bacteria, UA     None           Bilirubin (UA)     Negative           Site         LB       Color, UA     Yellow           SARS COV-2 MOLECULAR       Negative  Comment: This test utilizes isothermal nucleic acid amplification technology to detect the SARS-CoV-2 RdRp nucleic acid segment. The analytical sensitivity (limit of detection) is 500 copies/swab.     A POSITIVE result is indicative of the presence of  SARS-CoV-2 RNA; clinical correlation with patient history and other diagnostic information is necessary to determine patient infection status.    A NEGATIVE result means that SARS-CoV-2 nucleic acids are not present above the limit of detection. A NEGATIVE result should be treated as presumptive. It does not rule out the possibility of COVID-19 and should not be the sole basis for treatment decisions.    This test is Food and Drug Administration (FDA) approved.  Performance characteristics of this test has been independently verified by [facility] Department of Pathology and Laboratory Medicine.         DelSys         Nasal Can       FiO2         28       Flow         2       Glucose, UA     Negative           Group & Rh O POS               Extra Tube     Hold for add-ons.  Comment: Auto resulted.              Hyaline Casts, UA     0           INDIRECT DAYDAY NEG               Ketones, UA     Negative           Lactic Acid Level   0.8             Leukocyte Esterase, UA     1+           Microscopic Comment       Comment: Other formed elements not mentioned in the report are not present in the microscopic examination.           Mode         SPONT       NITRITE UA     Negative           Blood, UA     Negative           pH, UA     7.0           POC BE         11       POC HCO3         34.1       POC PCO2         45.1       POC PH         7.487       POC PO2         114       POC SATURATED O2         99       Protein, UA     2+  Comment: Recommend a 24 hour urine protein or a urine protein/creatinine ratio if globulin induced proteinuria is clinically suspected.           RBC, UA     4           Sample         ARTERIAL       Spec Grav UA     1.020           Specimen Outdate 05/12/2025 23:59               Squam Epithel, UA     3           Troponin I 0.067  Comment: The reference interval for Troponin I represents the 99th percentile cutoff for our facility and is consistent with 3rd generation assay  performance.               Urobilinogen, UA     Negative           WBC, UA     29                                  Significant Imaging: I have reviewed all pertinent imaging results/findings within the past 24 hours.    EXAM:  CT CHEST ABDOMEN PELVIS WITH IV CONTRAST (XPD)    HISTORY: Sepsis    TECHNIQUE: Axial images were acquired.  The patient was given IV contrast    COMPARISON: CT abdomen pelvis 04/04/2024.    Chest Findings: The lungs appear clear.  There is no evidence of consolidation or infiltrate.  There are no lung nodules or masses.  No evidence of mediastinal or hilar adenopathy.  Small left pleural effusion with adjacent mild focal consolidation probably corresponding to atelectasis.  Mild atelectasis at the right base posteriorly.  No infiltrates.    Normal size heart.  Coronary artery calcifications.  No pericardial effusions.    Previous vertebral plasty at T10.  Spinal stimulator is seen at the mid and lower thoracic levels.  No acute bony changes.    TECHNIQUE: Axial images were acquired after the administration of IV contrast.  The examination was performed in conjunction with a CT scan of the chest.    Abdomen and Pelvis Findings: The liver appears normal.    Previous cholecystectomy.  No significant bile duct dilatation.  Stable focus of suspected fatty versus anterior to the liver measuring 4.3 cm.    Spleen appears normal.  The pancreas is normal.    No abnormal masses or evidence of adenopathy.  6 cm left renal cyst.  No renal masses or hydronephrosis.    No evidence of aneurysm of the abdominal aorta.  IVC appears unremarkable.  No suspicious masses or adenopathy.    No evidence of bowel obstruction.  No evidence of appendicitis.  There appears be thickening of the fat around the sigmoid colon which could be related to diverticulitis.  Inflammatory changes also may extend to the vaginal cuff on the left.  There is air noted within the vaginal cuff raises the possibility of colovaginal  fistula.     Mild bladder wall thickening.  Possible cystitis.    No acute skeletal findings.  Previous lumbar interbody fusion at L3-4.     Impression:      Findings suspicious for sigmoid diverticulitis.  Inflammatory changes extending to the vaginal cuff with air within the vagina.  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    Possible cystitis.    Small left pleural effusion.  Mild bilateral discoid atelectasis at the lung bases.     Assessment/Plan:     Assessment & Plan  Diverticulitis  NPO   Rocephin and Flagyl  General surgery consulted  Monitor labs  Was given fluid bolus per ED    Abnormal abdominal CT scan  Possible colovaginal fistula--per CT scan report  General surgery consult pending    Anxiety and depression  Supportive care  Medications on hold due to somnolence and NPO    Hemiplegia and hemiparesis following cerebral infarction affecting left non-dominant side  Supportive care  Fall precautions    Nonischemic cardiomyopathy  Monitor fluid volume status closely  Per records -- history of Takotsubo cardiomyopathy (apical ballooning syndrome)  No echocardiogram results found for the past 12 months    Echo from 2022  The left ventricle is normal in size with concentric remodeling and normal systolic function.  The estimated ejection fraction is 60%.  Normal left ventricular diastolic function.  Normal right ventricular size with normal right ventricular systolic function.  Mild aortic regurgitation.  There is moderate pulmonary hypertension.  Normal central venous pressure (3 mmHg).  The estimated PA systolic pressure is 52 mmHg.      Stage 3a chronic kidney disease  Creatine stable for now. BMP reviewed- noted Estimated Creatinine Clearance: 43.5 mL/min (based on SCr of 0.8 mg/dL). according to latest data. Based on current GFR, CKD stage is stage 3 - GFR 30-59.  Monitor UOP and serial BMP and adjust therapy as needed. Renally dose meds. Avoid nephrotoxic medications and procedures.  Essential  hypertension  Patient's blood pressure range in the last 24 hours was: BP  Min: 105/46  Max: 203/88.The patient's inpatient anti-hypertensive regimen is listed below:  Current Antihypertensives  hydrALAZINE injection 10 mg, Every 6 hours PRN, Intravenous    Plan  - BP is controlled, no changes needed to their regimen -- at this time,  holding home meds while NPO  - PRN IV hydralazine ordered  Alzheimer's disease, unspecified (CODE)  Resume home medications when no longer NPO  Anemia  Chronic.  Most recent hemoglobin and hematocrit are listed below.  Recent Labs     05/08/25  1823   HGB 9.5*   HCT 30.8*     Plan  - Monitor serial CBC: Daily  - Transfuse PRBC if patient becomes hemodynamically unstable, symptomatic or H/H drops below 7/21.    Elevated troponin I level  Minimally elevated  Will trend  Cardiac monitoring ordered    VTE Risk Mitigation (From admission, onward)           Ordered     Reason for No Pharmacological VTE Prophylaxis  Once        Comments: Anemia, bleeding risk   Question:  Reasons:  Answer:  Physician Provided (leave comment)    05/09/25 0114     IP VTE HIGH RISK PATIENT  Once         05/09/25 0114     Place sequential compression device  Until discontinued         05/09/25 0114                    On 05/09/2025, patient should be placed in hospital observation services under my care in collaboration with Dr. Eliseo Stanford.           Myranda Gary NP  Department of Hospital Medicine  [facility]

## 2025-05-09 NOTE — CONSULTS
[facility] Surg  Wound Care    Patient Name:  Leslie Wood   MRN:  [id number]  Date: 5/9/2025  Diagnosis: Diverticulitis    History:     Past Medical History:   Diagnosis Date    Alzheimer's disease, unspecified (CODE)     Arthritis     Cataract     Dementia without behavioral disturbance     GERD (gastroesophageal reflux disease)     Heart attack 05/23/2022    Hypertension     Stroke        Social History[1]    Precautions:     Allergies as of 05/08/2025 - Reviewed 05/08/2025   Allergen Reaction Noted    Amitriptyline  05/09/2013    Hydrochlorothiazide  01/30/2018    Lisinopril  02/26/2019    Opioids - morphine analogues Hallucinations 04/16/2024    Oxycodone  11/16/2022    Percocet [oxycodone-acetaminophen] Other (See Comments) 08/14/2013    Belbuca [buprenorphine hcl] Nausea And Vomiting and Other (See Comments) 10/19/2022    Codeine Nausea Only and Rash 07/23/2012    Prazosin Other (See Comments) 03/04/2021       [facility] Assessment Details/Treatment     Consulted on this 83 y/o female patient for wounds to buttock, Coccyx, perineum, and right knee. Patient was admitted from the [facility] on 5/8/25 for diverticulitis. PMH significant for Alzheimer dementia, arthritis, cataracts, anxiety, depression, nonischemic cardiomyopathy, CKD stage IIIA, stroke with left hemiparesis, cellulitis, hypertension, MI, urinary tract infections, and elevated LFT's with recent admission from 4/25 to 4/29 due to cellulitis of LUE treated with Rocephin and clindamycin as well as diastolic congestive heart failure exacerbation, UTI, and hypertensive urgency discharged on cephalexin 500 mg BID.     Patient awake and alert receiving bath from [facility] upon arrival, wound care nurse offered to help. Wound care nurse gave introduction and reason for visit, patient agreeable to assessment and care.     --Skin tear noted to right lower arm with partial skin flap still in place. Remaining open wound bed is moist pink/red, scant amount of bleeding.  Cleansed with saline. Patted dry. Painted with cavilon.Applied bordered foam.   --Bilateral breasts slightly moist in folds, no discoloration or irritation noted at this time. Blue top moisture barrier applied.          --MASD noted to bilateral inner thighs that goes onto buttock. Skin remains intact but is very moist with red and in some areas light purple discoloration. Also noted POA Stage 3 PI to the sacral spine. Wound bed is moist pink/red with tan/yellow tissue noted. Cleansed all areas with bath wipes. Patted dry. Applied triad moisture barrier paste.         --Chronic area of maroon nonblanchable discoloration noted to the left anterior knee. Do not suspect it is a wound or pressure related, appears to be chronic.     --Right heel intact with some areas of blanchable redness noted mainly to the medial aspect. Left heel with old resolving DTPI, appears dried tan/brown and callused. Moisturizer applied to BLE. Applied bordered heel foams and heel offloading boots.           Recommend pressure injury preventions such as turning every 2 hours, heel offloading, and moisture management with versette. Orders placed. Plan to F/U in a week.        05/09/25 1006   WOCN Assessment   WOCN Total Time (mins) 45   Visit Date 05/09/25   Visit Time 1006   Consult Type New   WOCN Speciality Wound   Wound pressure;moisture;skin tear;At risk for pressure Injury   Intervention assessed;changed;applied;chart review;orders   Teaching on-going        Wound 05/09/25 1006 Moisture associated dermatitis Buttocks   Date First Assessed/Time First Assessed: 05/09/25 1006   Present on Original Admission: Yes  Primary Wound Type: Moisture associated dermatitis  Location: Buttocks   Wound Image    Dressing Appearance Open to air   Drainage Amount None   Drainage Characteristics/Odor No odor   Appearance Pink;Red;Maroon;Moist   Periwound Area Moist;Redness   Wound Edges Irregular   Care Cleansed with:;Soap and water;Applied:;Other (see  comments)  (triad paste)   Periwound Care Moisture barrier applied  (triad paste)   Dressing Change Due 05/09/25        Wound 05/09/25 1006 Skin Tear Right posterior;lower Arm   Date First Assessed/Time First Assessed: 05/09/25 1006   Present on Original Admission: Yes  Primary Wound Type: Skin Tear  Side: Right  Orientation: posterior;lower  Location: Arm   Wound Image    Dressing Appearance Open to air   Drainage Amount Scant   Drainage Characteristics/Odor Sanguineous   Appearance Pink;Red;Moist   Tissue loss description Partial thickness   Red (%), Wound Tissue Color 100 %   Periwound Area Intact;Dry;Ecchymotic   Wound Edges Irregular   Care Cleansed with:;Sterile normal saline;Applied:;Skin Barrier   Dressing Applied;Foam   Periwound Care Skin barrier film applied   Dressing Change Due 05/16/25        Wound 04/25/25 Pressure Injury Sacral spine   Date First Assessed: 04/25/25   Present on Original Admission: Yes  Primary Wound Type: Pressure Injury  Location: Sacral spine   Wound Image    Pressure Injury Stage 3   Dressing Appearance Open to air   Drainage Amount Scant   Drainage Characteristics/Odor Sanguineous   Appearance Pink;Tan;Yellow;Moist   Tissue loss description Full thickness   Red (%), Wound Tissue Color 60 %   Yellow (%), Wound Tissue Color 40 %   Periwound Area Moist;Redness   Wound Edges Irregular   Care Cleansed with:;Soap and water;Applied:;Other (see comments)  (triad paste)   Periwound Care Moisture barrier applied  (triad paste)   Dressing Change Due 05/09/25        Wound 04/25/25 1950 Pressure Injury Left upper Heel   Date First Assessed/Time First Assessed: 04/25/25 1950   Present on Original Admission: Yes  Primary Wound Type: Pressure Injury  Side: Left  Orientation: upper  Location: Heel   Wound Image    Pressure Injury Stage DTPI   Dressing Appearance Open to air   Drainage Amount None   Drainage Characteristics/Odor No odor   Appearance Maroon;Tan;Dry   Tissue loss description Not  applicable   Periwound Area Intact;Dry   Wound Edges Callused   Care Cleansed with:;Sterile normal saline;Applied:;Moisturizing agent   Dressing Applied;Foam   Periwound Care Skin barrier film applied   Dressing Change Due 05/13/25 05/09/2025         [1]   Social History  Socioeconomic History    Marital status:    Tobacco Use    Smoking status: Never    Smokeless tobacco: Never   Substance and Sexual Activity    Alcohol use: No    Drug use: No    Sexual activity: Not Currently     Social Drivers of Health     Financial Resource Strain: Patient Unable To Answer (4/26/2025)    Overall Financial Resource Strain (CARDIA)     Difficulty of Paying Living Expenses: Patient unable to answer   Food Insecurity: Patient Unable To Answer (4/26/2025)    Hunger Vital Sign     Worried About Running Out of Food in the Last Year: Patient unable to answer     Ran Out of Food in the Last Year: Patient unable to answer   Transportation Needs: Patient Unable To Answer (4/26/2025)    PRAPARE - Transportation     Lack of Transportation (Medical): Patient unable to answer     Lack of Transportation (Non-Medical): Patient unable to answer   Physical Activity: Insufficiently Active (6/4/2024)    Exercise Vital Sign     Days of Exercise per Week: 6 days     Minutes of Exercise per Session: 20 min   Stress: Patient Unable To Answer (4/26/2025)    [employer] - Occupational Stress Questionnaire     Feeling of Stress : Patient unable to answer   Housing Stability: Patient Unable To Answer (4/26/2025)    Housing Stability Vital Sign     Unable to Pay for Housing in the Last Year: Patient unable to answer     Homeless in the Last Year: Patient unable to answer

## 2025-05-09 NOTE — ASSESSMENT & PLAN NOTE
Creatine stable for now. BMP reviewed- noted Estimated Creatinine Clearance: 43.5 mL/min (based on SCr of 0.8 mg/dL). according to latest data. Based on current GFR, CKD stage is stage 3 - GFR 30-59.  Monitor UOP and serial BMP and adjust therapy as needed. Renally dose meds. Avoid nephrotoxic medications and procedures.

## 2025-05-09 NOTE — HPI
Patient is 84-year-old female with past medical history significant for Alzheimer dementia, arthritis, cataracts, anxiety, depression, nonischemic cardiomyopathy, CKD stage IIIA, stroke with left hemiparesis, cellulitis, hypertension, MI, urinary tract infections, and elevated LFT's with recent admission from 4/25 to 4/29 due to cellulitis of LUE treated with Rocephin and clindamycin as well as diastolic congestive heart failure exacerbation, UTI, and hypertensive urgency discharged on cephalexin 500 mg BID, went back to [facility], now brought to ED for evaluation of fever and lethargy, with staff reporting that she is not as alert as she normally is. She was also found to have SpO2 90% on room air and was placed on oxygen prior to arrival in ED. She also complained of weakness and dizziness.  on arrival to ED, temp 99.4°, heart rate 80, respirations 20, blood pressure 161/60, 97% SpO2 on 2 L nasal cannula.  Lab workup revealed WBC 13.25, RBC 3.46, hemoglobin 9.5, hematocrit 30.8, platelets 286, sodium 140, potassium 3.5, chloride 101, CO2 30, BUN 18, creatinine 0.8, glucose 128, magnesium 1.9, alk-phos 80, albumin 1.9, AST 51, ALT 32, lipase 30, , troponin 0.058, lactic acid 0.8, procalcitonin 0.17, negative COVID-19 and influenza A/B screenings, UA done and 1+ leukocyte esterase, WBC 29, no bacteria noted, negative nitrite. ABG done and pH 7.487, PCO2 45.1, pO2 114, HCO2 34.1, 99% - on nasal cannula. Blood cultures x 2 and urine culture pending. CXR did not reveal any definite infiltrates. CT chest/abdomen/pelvis with IV contrast done which showed small left pleural effusion, atelectasis, probable cystitis, possible diverticulitis, and inflammatory changes extending to vaginal cuff on the left raising possibility of colovaginal fistula. While in ED, she was given IV fluid bolus 1638 ml, IV cefepime, and then later given Lasix 40 mg IV. General Surgery was consulted per ED and recommended admission per  [facility] medicine.

## 2025-05-09 NOTE — PT/OT/SLP EVAL
Physical Therapy Evaluation and Discharge Note    Patient Name:  Leslie Wood   MRN:  [id number]    Recommendations:     Discharge Recommendations: No Therapy Indicated (RETURN TO NH)  Discharge Equipment Recommendations: none   Barriers to discharge: None    Assessment:     Leslie Wood is a 84 y.o. female admitted with a medical diagnosis of Diverticulitis. .  At this time, patient is functioning at their prior level of function and does not require further acute PT services.     Recent Surgery: * No surgery found *      Plan:     During this hospitalization, patient does not require further acute PT services.  Please re-consult if situation changes.      Subjective     Chief Complaint: NONE   Patient/Family Comments/goals: NONE STATED   Pain/Comfort:  Pain Rating 1: 0/10  Pain Rating Post-Intervention 1: 0/10    Patients cultural, spiritual, Restoration conflicts given the current situation:      Living Environment:   PT ADMITTED FROM THE [facility]   Prior to admission, patients level of function was TOTAL CARE/ NONAMBULATORY .  Equipment used at home: none.  DME owned (not currently used): none.  Upon discharge, patient will have assistance from NH STAFF .    Objective:     Communicated with NURSE IBANEZ AND EPIC CHART REVIEW  prior to session.  Patient found supine with peripheral IV upon PT entry to room.    General Precautions: Standard, fall    Orthopedic Precautions:N/A   Braces: N/A  Respiratory Status: Room air    Exams:  Cognitive Exam:  Patient is oriented to Person and Place    Functional Mobility:  Bed Mobility:     Rolling Right: moderate assistance  Scooting: moderate assistance  Supine to Sit: moderate assistance  Transfers:     Sit to Stand:  maximal assistance and of 2 persons with rolling walker  Bed to Chair: maximal assistance and of 2 persons with  rolling walker  using  Stand Pivot    AM-PAC 6 CLICK MOBILITY  Total Score:10       Treatment and Education:  GT. BELT AND   SOCKS DONNED PRIOR TO OOB MOBILITY.   PT WITH POST LEAN / PUSHING IN STANDING. PT TO CHAIR FOR OOB TOLERANCE  PT EDUCATED ON USE OF CALL LIGHT HOWEVER NO CARRYOVER. PT NURSE AWARE OF ASSIST LEVEL       AM-PAC 6 CLICK MOBILITY  Total Score:10     Patient left up in chair with all lines intact, call button in reach, chair alarm on, and NURSE TINY DUMONT notified.    GOALS:   Multidisciplinary Problems       Physical Therapy Goals       Not on file                    DME Justifications:  No DME recommended requiring DME justifications    History:     Past Medical History:   Diagnosis Date    Alzheimer's disease, unspecified (CODE)     Arthritis     Cataract     Dementia without behavioral disturbance     GERD (gastroesophageal reflux disease)     Heart attack 05/23/2022    Hypertension     Stroke        Past Surgical History:   Procedure Laterality Date    ADENOIDECTOMY      ADRENAL GLAND SURGERY      APPENDECTOMY      BACK SURGERY      fusion l 4-5 s 1,2,3  fusion l 2-3    CORONARY ANGIOGRAPHY N/A 05/20/2022    Procedure: ANGIOGRAM, CORONARY ARTERY;  Surgeon: Domingo Martins MD;  Location: [facility] CATH LAB;  Service: Cardiology;  Laterality: N/A;    CORONARY ANGIOGRAPHY N/A 05/24/2022    Procedure: ANGIOGRAM, CORONARY ARTERY;  Surgeon: Domingo Martnis MD;  Location: [facility] CATH LAB;  Service: Cardiology;  Laterality: N/A;    EYE SURGERY      HEMORRHOID SURGERY      HERNIA REPAIR      HYSTERECTOMY      partial    indirect lumbar decompression      percutaneous placement of interspinous extension blocker    LEFT HEART CATHETERIZATION Left 05/20/2022    Procedure: CATHETERIZATION, HEART, LEFT;  Surgeon: Domingo Martins MD;  Location: [facility] CATH LAB;  Service: Cardiology;  Laterality: Left;    TONSILLECTOMY         Time Tracking:     PT Received On: 05/09/25  PT Start Time: 0840     PT Stop Time: 0905  PT Total Time (min): 25 min     Billable Minutes: Evaluation 15 and Therapeutic Activity 10      05/09/2025

## 2025-05-09 NOTE — ASSESSMENT & PLAN NOTE
Patient's blood pressure range in the last 24 hours was: BP  Min: 105/46  Max: 203/88.The patient's inpatient anti-hypertensive regimen is listed below:  Current Antihypertensives  hydrALAZINE injection 10 mg, Every 6 hours PRN, Intravenous    Plan  - BP is controlled, no changes needed to their regimen -- at this time,  holding home meds while NPO  - PRN IV hydralazine ordered

## 2025-05-10 LAB
ABSOLUTE EOSINOPHIL (OHS): 0.04 K/UL
ABSOLUTE MONOCYTE (OHS): 1.27 K/UL (ref 0.3–1)
ABSOLUTE NEUTROPHIL COUNT (OHS): 8.31 K/UL (ref 1.8–7.7)
ALBUMIN SERPL BCP-MCNC: 1.6 G/DL (ref 3.5–5.2)
ALP SERPL-CCNC: 71 UNIT/L (ref 40–150)
ALT SERPL W/O P-5'-P-CCNC: 34 UNIT/L (ref 10–44)
ANION GAP (OHS): 12 MMOL/L (ref 8–16)
AST SERPL-CCNC: 51 UNIT/L (ref 11–45)
AV INDEX (PROSTH): 0.62
AV MEAN GRADIENT: 10 MMHG
AV PEAK GRADIENT: 19 MMHG
AV REGURGITATION PRESSURE HALF TIME: 466 MS
AV VALVE AREA BY VELOCITY RATIO: 1.8 CM²
AV VALVE AREA: 1.7 CM²
AV VELOCITY RATIO: 0.64
BACTERIA UR CULT: NORMAL
BASOPHILS # BLD AUTO: 0.02 K/UL
BASOPHILS NFR BLD AUTO: 0.2 %
BILIRUB SERPL-MCNC: 0.4 MG/DL (ref 0.1–1)
BSA FOR ECHO PROCEDURE: 1.67 M2
BUN SERPL-MCNC: 20 MG/DL (ref 8–23)
CALCIUM SERPL-MCNC: 8.3 MG/DL (ref 8.7–10.5)
CHLORIDE SERPL-SCNC: 104 MMOL/L (ref 95–110)
CO2 SERPL-SCNC: 26 MMOL/L (ref 23–29)
CREAT SERPL-MCNC: 0.7 MG/DL (ref 0.5–1.4)
CV ECHO LV RWT: 0.33 CM
DOP CALC AO PEAK VEL: 2.2 M/S
DOP CALC AO VTI: 51.2 CM
DOP CALC LVOT AREA: 2.8 CM2
DOP CALC LVOT DIAMETER: 1.9 CM
DOP CALC LVOT PEAK VEL: 1.4 M/S
DOP CALC LVOT STROKE VOLUME: 89.3 CM3
DOP CALCLVOT PEAK VEL VTI: 31.5 CM
E WAVE DECELERATION TIME: 198 MSEC
E/A RATIO: 1.18
E/E' RATIO: 16 M/S
ECHO LV POSTERIOR WALL: 0.7 CM (ref 0.6–1.1)
ERYTHROCYTE [DISTWIDTH] IN BLOOD BY AUTOMATED COUNT: 15.1 % (ref 11.5–14.5)
FERRITIN SERPL-MCNC: 821.9 NG/ML (ref 20–300)
FRACTIONAL SHORTENING: 25.6 % (ref 28–44)
GFR SERPLBLD CREATININE-BSD FMLA CKD-EPI: >60 ML/MIN/1.73/M2
GLUCOSE SERPL-MCNC: 91 MG/DL (ref 70–110)
HCT VFR BLD AUTO: 25.7 % (ref 37–48.5)
HCT VFR BLD AUTO: 26 % (ref 37–48.5)
HGB BLD-MCNC: 7.9 GM/DL (ref 12–16)
HGB BLD-MCNC: 8 GM/DL (ref 12–16)
IMM GRANULOCYTES # BLD AUTO: 0.08 K/UL (ref 0–0.04)
IMM GRANULOCYTES NFR BLD AUTO: 0.7 % (ref 0–0.5)
INTERVENTRICULAR SEPTUM: 0.7 CM (ref 0.6–1.1)
IRON SATN MFR SERPL: 13 % (ref 20–50)
IRON SERPL-MCNC: 19 UG/DL (ref 30–160)
IVC DIAMETER: 1.13 CM
IVRT: 65 MSEC
LA MAJOR: 5.5 CM
LA MINOR: 5.5 CM
LEFT ATRIUM SIZE: 2.9 CM
LEFT INTERNAL DIMENSION IN SYSTOLE: 3.2 CM (ref 2.1–4)
LEFT VENTRICLE DIASTOLIC VOLUME INDEX: 49.39 ML/M2
LEFT VENTRICLE DIASTOLIC VOLUME: 81 ML
LEFT VENTRICLE MASS INDEX: 54 G/M2
LEFT VENTRICLE SYSTOLIC VOLUME INDEX: 25.6 ML/M2
LEFT VENTRICLE SYSTOLIC VOLUME: 42 ML
LEFT VENTRICULAR INTERNAL DIMENSION IN DIASTOLE: 4.3 CM (ref 3.5–6)
LEFT VENTRICULAR MASS: 88.5 G
LV LATERAL E/E' RATIO: 13.8 M/S
LV SEPTAL E/E' RATIO: 17.7 M/S
LVED V (TEICH): 81.4 ML
LVES V (TEICH): 41.97 ML
LVOT MG: 3.02 MMHG
LVOT MV: 0.8 CM/S
LYMPHOCYTES # BLD AUTO: 1.77 K/UL (ref 1–4.8)
MAGNESIUM SERPL-MCNC: 1.8 MG/DL (ref 1.6–2.6)
MCH RBC QN AUTO: 27.2 PG (ref 27–31)
MCHC RBC AUTO-ENTMCNC: 30.7 G/DL (ref 32–36)
MCV RBC AUTO: 89 FL (ref 82–98)
MV PEAK A VEL: 1.05 M/S
MV PEAK E VEL: 1.24 M/S
NUCLEATED RBC (/100WBC) (OHS): 0 /100 WBC
PISA AR MAX VEL: 3.85 M/S
PISA TR MAX VEL: 3.1 M/S
PLATELET # BLD AUTO: 287 K/UL (ref 150–450)
PMV BLD AUTO: 9.6 FL (ref 9.2–12.9)
POTASSIUM SERPL-SCNC: 3 MMOL/L (ref 3.5–5.1)
PROT SERPL-MCNC: 6.7 GM/DL (ref 6–8.4)
PV MV: 1.02 M/S
PV PEAK GRADIENT: 8 MMHG
PV PEAK VELOCITY: 1.41 M/S
RA MAJOR: 5.2 CM
RA PRESSURE ESTIMATED: 3 MMHG
RBC # BLD AUTO: 2.9 M/UL (ref 4–5.4)
RELATIVE EOSINOPHIL (OHS): 0.3 %
RELATIVE LYMPHOCYTE (OHS): 15.4 % (ref 18–48)
RELATIVE MONOCYTE (OHS): 11.1 % (ref 4–15)
RELATIVE NEUTROPHIL (OHS): 72.3 % (ref 38–73)
RV TB RVSP: 6 MMHG
SODIUM SERPL-SCNC: 142 MMOL/L (ref 136–145)
STJ: 2 CM
TDI LATERAL: 0.09 M/S
TDI SEPTAL: 0.07 M/S
TDI: 0.08 M/S
TIBC SERPL-MCNC: 147 UG/DL (ref 250–450)
TR MAX PG: 46 MMHG
TRANSFERRIN SERPL-MCNC: 99 MG/DL (ref 200–375)
TRICUSPID ANNULAR PLANE SYSTOLIC EXCURSION: 2.2 CM
TROPONIN I SERPL DL<=0.01 NG/ML-MCNC: 0.01 NG/ML
TV REST PULMONARY ARTERY PRESSURE: 41 MMHG
WBC # BLD AUTO: 11.49 K/UL (ref 3.9–12.7)
Z-SCORE OF LEFT VENTRICULAR DIMENSION IN END DIASTOLE: -0.71
Z-SCORE OF LEFT VENTRICULAR DIMENSION IN END SYSTOLE: 0.89

## 2025-05-10 PROCEDURE — 83735 ASSAY OF MAGNESIUM: CPT | Performed by: NURSE PRACTITIONER

## 2025-05-10 PROCEDURE — 84466 ASSAY OF TRANSFERRIN: CPT | Performed by: FAMILY MEDICINE

## 2025-05-10 PROCEDURE — 99233 SBSQ HOSP IP/OBS HIGH 50: CPT | Mod: ,,, | Performed by: SURGERY

## 2025-05-10 PROCEDURE — 85025 COMPLETE CBC W/AUTO DIFF WBC: CPT | Performed by: NURSE PRACTITIONER

## 2025-05-10 PROCEDURE — 21400001 HC TELEMETRY ROOM

## 2025-05-10 PROCEDURE — 63600175 PHARM REV CODE 636 W HCPCS: Performed by: FAMILY MEDICINE

## 2025-05-10 PROCEDURE — 36415 COLL VENOUS BLD VENIPUNCTURE: CPT | Performed by: FAMILY MEDICINE

## 2025-05-10 PROCEDURE — 85014 HEMATOCRIT: CPT | Performed by: FAMILY MEDICINE

## 2025-05-10 PROCEDURE — 80053 COMPREHEN METABOLIC PANEL: CPT | Performed by: NURSE PRACTITIONER

## 2025-05-10 PROCEDURE — 82728 ASSAY OF FERRITIN: CPT | Performed by: FAMILY MEDICINE

## 2025-05-10 PROCEDURE — 63600175 PHARM REV CODE 636 W HCPCS: Performed by: NURSE PRACTITIONER

## 2025-05-10 PROCEDURE — 36415 COLL VENOUS BLD VENIPUNCTURE: CPT | Performed by: NURSE PRACTITIONER

## 2025-05-10 PROCEDURE — 93005 ELECTROCARDIOGRAM TRACING: CPT

## 2025-05-10 PROCEDURE — 94761 N-INVAS EAR/PLS OXIMETRY MLT: CPT

## 2025-05-10 PROCEDURE — 84484 ASSAY OF TROPONIN QUANT: CPT | Performed by: NURSE PRACTITIONER

## 2025-05-10 PROCEDURE — 93010 ELECTROCARDIOGRAM REPORT: CPT | Mod: ,,, | Performed by: INTERNAL MEDICINE

## 2025-05-10 PROCEDURE — 85018 HEMOGLOBIN: CPT | Performed by: FAMILY MEDICINE

## 2025-05-10 PROCEDURE — 25000242 PHARM REV CODE 250 ALT 637 W/ HCPCS: Performed by: NURSE PRACTITIONER

## 2025-05-10 PROCEDURE — 25000003 PHARM REV CODE 250: Performed by: FAMILY MEDICINE

## 2025-05-10 RX ORDER — NAPROXEN SODIUM 220 MG/1
81 TABLET, FILM COATED ORAL DAILY
Status: DISCONTINUED | OUTPATIENT
Start: 2025-05-10 | End: 2025-05-11 | Stop reason: HOSPADM

## 2025-05-10 RX ORDER — ATORVASTATIN CALCIUM 10 MG/1
10 TABLET, FILM COATED ORAL NIGHTLY
Status: DISCONTINUED | OUTPATIENT
Start: 2025-05-10 | End: 2025-05-11 | Stop reason: HOSPADM

## 2025-05-10 RX ORDER — LABETALOL HYDROCHLORIDE 5 MG/ML
10 INJECTION, SOLUTION INTRAVENOUS EVERY 4 HOURS PRN
Status: DISCONTINUED | OUTPATIENT
Start: 2025-05-10 | End: 2025-05-11 | Stop reason: HOSPADM

## 2025-05-10 RX ORDER — FLUOXETINE 10 MG/1
10 CAPSULE ORAL DAILY
Status: DISCONTINUED | OUTPATIENT
Start: 2025-05-10 | End: 2025-05-11 | Stop reason: HOSPADM

## 2025-05-10 RX ORDER — OLANZAPINE 5 MG/1
5 TABLET, FILM COATED ORAL NIGHTLY
Status: DISCONTINUED | OUTPATIENT
Start: 2025-05-10 | End: 2025-05-11 | Stop reason: HOSPADM

## 2025-05-10 RX ORDER — HYDRALAZINE HYDROCHLORIDE 20 MG/ML
10 INJECTION INTRAMUSCULAR; INTRAVENOUS EVERY 6 HOURS PRN
Status: DISCONTINUED | OUTPATIENT
Start: 2025-05-10 | End: 2025-05-11 | Stop reason: HOSPADM

## 2025-05-10 RX ORDER — DONEPEZIL HYDROCHLORIDE 5 MG/1
10 TABLET, FILM COATED ORAL NIGHTLY
Status: DISCONTINUED | OUTPATIENT
Start: 2025-05-10 | End: 2025-05-11 | Stop reason: HOSPADM

## 2025-05-10 RX ORDER — SODIUM CHLORIDE 9 MG/ML
INJECTION, SOLUTION INTRAVENOUS CONTINUOUS
Status: DISCONTINUED | OUTPATIENT
Start: 2025-05-10 | End: 2025-05-11 | Stop reason: HOSPADM

## 2025-05-10 RX ORDER — MEMANTINE HYDROCHLORIDE 10 MG/1
10 TABLET ORAL 2 TIMES DAILY
Status: DISCONTINUED | OUTPATIENT
Start: 2025-05-10 | End: 2025-05-11 | Stop reason: HOSPADM

## 2025-05-10 RX ORDER — NITROGLYCERIN 0.4 MG/1
0.4 TABLET SUBLINGUAL ONCE AS NEEDED
Status: COMPLETED | OUTPATIENT
Start: 2025-05-10 | End: 2025-05-10

## 2025-05-10 RX ADMIN — DONEPEZIL HYDROCHLORIDE 10 MG: 5 TABLET, FILM COATED ORAL at 09:05

## 2025-05-10 RX ADMIN — MEMANTINE 10 MG: 10 TABLET ORAL at 09:05

## 2025-05-10 RX ADMIN — MEMANTINE 10 MG: 10 TABLET ORAL at 11:05

## 2025-05-10 RX ADMIN — ASPIRIN 81 MG CHEWABLE TABLET 81 MG: 81 TABLET CHEWABLE at 11:05

## 2025-05-10 RX ADMIN — HYDRALAZINE HYDROCHLORIDE 10 MG: 20 INJECTION INTRAMUSCULAR; INTRAVENOUS at 03:05

## 2025-05-10 RX ADMIN — POTASSIUM BICARBONATE 50 MEQ: 978 TABLET, EFFERVESCENT ORAL at 12:05

## 2025-05-10 RX ADMIN — ATORVASTATIN CALCIUM 10 MG: 10 TABLET, FILM COATED ORAL at 09:05

## 2025-05-10 RX ADMIN — CEFTRIAXONE SODIUM 2 G: 2 INJECTION, POWDER, FOR SOLUTION INTRAMUSCULAR; INTRAVENOUS at 05:05

## 2025-05-10 RX ADMIN — METOPROLOL SUCCINATE 12.5 MG: 25 TABLET, EXTENDED RELEASE ORAL at 11:05

## 2025-05-10 RX ADMIN — METRONIDAZOLE 500 MG: 5 INJECTION, SOLUTION INTRAVENOUS at 09:05

## 2025-05-10 RX ADMIN — OLANZAPINE 5 MG: 5 TABLET, FILM COATED ORAL at 09:05

## 2025-05-10 RX ADMIN — LABETALOL HYDROCHLORIDE 10 MG: 5 INJECTION INTRAVENOUS at 05:05

## 2025-05-10 RX ADMIN — METRONIDAZOLE 500 MG: 5 INJECTION, SOLUTION INTRAVENOUS at 05:05

## 2025-05-10 RX ADMIN — NITROGLYCERIN 0.4 MG: 0.4 TABLET SUBLINGUAL at 09:05

## 2025-05-10 RX ADMIN — SODIUM CHLORIDE: 9 INJECTION, SOLUTION INTRAVENOUS at 06:05

## 2025-05-10 RX ADMIN — METRONIDAZOLE 500 MG: 5 INJECTION, SOLUTION INTRAVENOUS at 01:05

## 2025-05-10 RX ADMIN — HYDRALAZINE HYDROCHLORIDE 10 MG: 20 INJECTION INTRAMUSCULAR; INTRAVENOUS at 12:05

## 2025-05-10 RX ADMIN — FLUOXETINE HYDROCHLORIDE 10 MG: 10 CAPSULE ORAL at 11:05

## 2025-05-10 NOTE — ASSESSMENT & PLAN NOTE
Patient's blood pressure range in the last 24 hours was: BP  Min: 138/62  Max: 193/81.The patient's inpatient anti-hypertensive regimen is listed below:  Current Antihypertensives  hydrALAZINE injection 10 mg, Every 6 hours PRN, Intravenous  metoprolol succinate (TOPROL-XL) 24 hr split tablet 12.5 mg, Daily, Oral    Plan  - BP is controlled, no changes needed to their regimen -- at this time,  holding home meds while NPO  - PRN IV hydralazine ordered

## 2025-05-10 NOTE — HOSPITAL COURSE
Patient was admitted for diverticulitis.  CT scan was concerning for colovaginal fistula.  Patient relatively asymptomatic and high risk for surgical repair.  No surgery recommended at this time.  She was initiated on Rocephin and Flagyl.  Transitioned to p.o. Augmentin.  H&H remained stable.  Iron-deficiency noted.  Ferrous sulfate initiated.  Patient is discharged back to nursing home.

## 2025-05-10 NOTE — PLAN OF CARE
Discussed poc with pt, pt verbalized understanding     Purposeful rounding every 2hours     VS wnl  Fall precautions in place, remains injury free  Pt denies c/o pain and nausea at this time  Pain and nausea under control with PRN meds     IVFs  Accurate I&Os  Abx given as prescribed  Bed locked at lowest position  Call light within reach     Chart check complete  Will cont with POC

## 2025-05-10 NOTE — PROGRESS NOTES
[facility] Surg  General Surgery  Progress Note    Subjective:     History of Present Illness:  83yo F with a PMHx significant for Alzheimer dementia, arthritis, cataracts, anxiety, cardiomyopathy, CKD, CVA, hypertension, MI and UTIs who presents from her SNF for fever and lethargy.  She was admitted to the hospital medical service after found to have a workup in the ER concerning for a leukocytosis of 13k and a CT scan showing possible diverticulitis with possible colovaginal fistula.  Surgery consulted for evaluation.  Most of the history is obtained from patient's daughter as she has dementia.  Family denies previous colorectal cancer but does endorse a previous colonoscopy over 10 years ago and believes that the patient has had colonic polyps.  Has never had known history of diverticulitis.  Has no known stool or flatus per vagina.  Has had recurrent UTIs over the past few months.  Previous abdominal surgery for adrenal tumor removal.  Patient denies current abdominal pain.    Post-Op Info:  * No surgery found *         Interval History:  No complaints.  Patient does have dementia.      There were no plans for surgery with regard to the colovaginal fistula    Medications:  Continuous Infusions:  Scheduled Meds:   cefTRIAXone (Rocephin) IV (PEDS and ADULTS)  2 g Intravenous Q24H    metroNIDAZOLE IV (PEDS and ADULTS)  500 mg Intravenous Q8H     PRN Meds:  Current Facility-Administered Medications:     albuterol-ipratropium, 3 mL, Nebulization, Q6H PRN    dextrose 50%, 12.5 g, Intravenous, PRN    dextrose 50%, 25 g, Intravenous, PRN    glucagon (human recombinant), 1 mg, Intramuscular, PRN    glucose, 16 g, Oral, PRN    glucose, 24 g, Oral, PRN    hydrALAZINE, 10 mg, Intravenous, Q6H PRN    naloxone, 0.02 mg, Intravenous, PRN    ondansetron, 4 mg, Intravenous, Q8H PRN    sodium chloride 0.9%, 3 mL, Intravenous, Q8H PRN     Review of patient's allergies indicates:   Allergen Reactions    Amitriptyline      Hydrochlorothiazide      Causes muscle cramping    Lisinopril      hyperkalemia    Opioids - morphine analogues Hallucinations     Chest pain and hallucinations    Oxycodone     Percocet [oxycodone-acetaminophen] Other (See Comments)     Seizures    Belbuca [buprenorphine hcl] Nausea And Vomiting and Other (See Comments)     Black out     Codeine Nausea Only and Rash    Prazosin Other (See Comments)     dizziness     Objective:     Vital Signs (Most Recent):  Temp: 98.7 °F (37.1 °C) (05/10/25 0815)  Pulse: 77 (05/10/25 0815)  Resp: 20 (05/10/25 0815)  BP: (!) 154/66 (05/10/25 0815)  SpO2: (!) 89 % (05/10/25 0815) Vital Signs (24h Range):  Temp:  [98 °F (36.7 °C)-98.7 °F (37.1 °C)] 98.7 °F (37.1 °C)  Pulse:  [56-77] 77  Resp:  [17-20] 20  SpO2:  [89 %-96 %] 89 %  BP: (138-193)/(62-81) 154/66     Weight: 64.6 kg (142 lb 6.7 oz)  Body mass index is 26.91 kg/m².    Intake/Output - Last 3 Shifts         05/08 0700  05/09 0659 05/09 0700  05/10 0659 05/10 0700  05/11 0659    IV Piggyback 1638      Total Intake(mL/kg) 1638 (25.3)      Urine (mL/kg/hr) 800 1350 (0.9)     Stool  1     Total Output 800 1351     Net +838 -1351            Unmeasured Urine Occurrence  0 x              Physical Exam  Vitals reviewed.   Constitutional:       Appearance: She is well-developed. Ill appearance: More chronic.   HENT:      Head: Normocephalic.   Eyes:      Pupils: Pupils are equal, round, and reactive to light.   Neck:      Thyroid: No thyromegaly.      Vascular: No JVD.      Trachea: No tracheal deviation.   Cardiovascular:      Rate and Rhythm: Normal rate and regular rhythm.      Heart sounds: Normal heart sounds.   Pulmonary:      Breath sounds: Normal breath sounds. No wheezing.   Abdominal:      General: Bowel sounds are normal. There is no distension.      Palpations: Abdomen is soft. Abdomen is not rigid. There is no mass.      Tenderness: There is no abdominal tenderness. There is no guarding or rebound.      Comments: Long  midline scar   Musculoskeletal:         General: Normal range of motion.   Lymphadenopathy:      Cervical: No cervical adenopathy.   Skin:     General: Skin is warm and dry.      Findings: No erythema or rash.   Neurological:      Mental Status: She is alert.          Significant Labs:  I have reviewed all pertinent lab results within the past 24 hours.  CBC:   Recent Labs   Lab 05/10/25  0722   WBC 11.49   RBC 2.90*   HGB 7.9*   HCT 25.7*      MCV 89   MCH 27.2   MCHC 30.7*     BMP:   Recent Labs   Lab 05/09/25  0704   GLU 94      K 2.9*      CO2 31*   BUN 17   CREATININE 0.7   CALCIUM 8.6*   MG 1.8       Significant Diagnostics:  I have reviewed all pertinent imaging results/findings within the past 24 hours.  No new  Assessment/Plan:     * Diverticulitis  85yo F with possible diverticulitis    - Patient currently asymptomatic from an abdominal standpoint.  Has no abdominal pain.  Has no vaginal drainage of stool or flatus.  Even if patient were to have a colovaginal fistula, after discussion with the patient's daughter, she would likely not want surgical intervention which is reasonable given the patient's clinical condition.  Would treat with IV antibiotics for now to see if any infection can be cleared.  - okay for diet from surgical standpoint as there does not appear to be acute diverticulitis this time  - rest of care per primary team  -disposition per primary team    Anemia  Care per primary team    Elevated troponin I level  Care per primary team    Alzheimer's disease, unspecified (CODE)  Care per primary team    Essential hypertension  Care per primary team    Stage 3a chronic kidney disease  Care per primary team    Nonischemic cardiomyopathy  Care per primary team    Hemiplegia and hemiparesis following cerebral infarction affecting left non-dominant side  Care per primary team    Anxiety and depression  Care per primary team        Adi Johnson MD  General Surgery  [facility]  Med  Surg

## 2025-05-10 NOTE — PROGRESS NOTES
[facility] Medicine  Progress Note    Patient Name: Leslie Wood  MRN: [id number]  Patient Class: IP- Inpatient   Admission Date: 5/8/2025  Length of Stay: 1 days  Attending Physician: Sammy Juarez MD  Primary Care Provider: No primary care provider on file.        Subjective     Principal Problem:Diverticulitis        HPI:    Patient is 84-year-old female with past medical history significant for Alzheimer dementia, arthritis, cataracts, anxiety, depression, nonischemic cardiomyopathy, CKD stage IIIA, stroke with left hemiparesis, cellulitis, hypertension, MI, urinary tract infections, and elevated LFT's with recent admission from 4/25 to 4/29 due to cellulitis of LUE treated with Rocephin and clindamycin as well as diastolic congestive heart failure exacerbation, UTI, and hypertensive urgency discharged on cephalexin 500 mg BID, went back to [facility], now brought to ED for evaluation of fever and lethargy, with staff reporting that she is not as alert as she normally is. She was also found to have SpO2 90% on room air and was placed on oxygen prior to arrival in ED. She also complained of weakness and dizziness.  on arrival to ED, temp 99.4°, heart rate 80, respirations 20, blood pressure 161/60, 97% SpO2 on 2 L nasal cannula.  Lab workup revealed WBC 13.25, RBC 3.46, hemoglobin 9.5, hematocrit 30.8, platelets 286, sodium 140, potassium 3.5, chloride 101, CO2 30, BUN 18, creatinine 0.8, glucose 128, magnesium 1.9, alk-phos 80, albumin 1.9, AST 51, ALT 32, lipase 30, , troponin 0.058, lactic acid 0.8, procalcitonin 0.17, negative COVID-19 and influenza A/B screenings, UA done and 1+ leukocyte esterase, WBC 29, no bacteria noted, negative nitrite. ABG done and pH 7.487, PCO2 45.1, pO2 114, HCO2 34.1, 99% - on nasal cannula. Blood cultures x 2 and urine culture pending. CXR did not reveal any definite infiltrates. CT chest/abdomen/pelvis with IV contrast done which showed small left pleural  effusion, atelectasis, probable cystitis, possible diverticulitis, and inflammatory changes extending to vaginal cuff on the left raising possibility of colovaginal fistula. While in ED, she was given IV fluid bolus 1638 ml, IV cefepime, and then later given Lasix 40 mg IV. General Surgery was consulted per ED and recommended admission per hospital medicine.    Overview/Hospital Course:  05/10/2025  No surgery per surgery. Will cont diet. Cont IV abx. Observe overnight for stability. Possible dc back to NH tomorrow. Anemia workup.     Interval History: No acute issues overnight.     Review of Systems   Constitutional:  Negative for fatigue and fever.   HENT:  Negative for sinus pressure.    Eyes:  Negative for visual disturbance.   Respiratory:  Negative for shortness of breath.    Cardiovascular:  Negative for chest pain.   Gastrointestinal:  Negative for nausea and vomiting.   Genitourinary:  Negative for difficulty urinating.   Musculoskeletal:  Negative for back pain.   Skin:  Negative for rash.   Neurological:  Negative for headaches.   Psychiatric/Behavioral:  Negative for confusion.      Objective:     Vital Signs (Most Recent):  Temp: 99.4 °F (37.4 °C) (05/10/25 1150)  Pulse: 73 (05/10/25 1150)  Resp: 20 (05/10/25 1150)  BP: (!) 180/77 (05/10/25 1150)  SpO2: (!) 90 % (05/10/25 1150) Vital Signs (24h Range):  Temp:  [98.4 °F (36.9 °C)-99.4 °F (37.4 °C)] 99.4 °F (37.4 °C)  Pulse:  [57-77] 73  Resp:  [17-20] 20  SpO2:  [89 %-93 %] 90 %  BP: (138-193)/(62-81) 180/77     Weight: 64.6 kg (142 lb 6.7 oz)  Body mass index is 26.91 kg/m².    Intake/Output Summary (Last 24 hours) at 5/10/2025 1407  Last data filed at 5/10/2025 0400  Gross per 24 hour   Intake --   Output 901 ml   Net -901 ml         Physical Exam  Vitals reviewed.   Constitutional:       General: She is not in acute distress.     Appearance: She is ill-appearing. She is not diaphoretic.      Comments: Chronically ill-appearing elderly female, sleeping  soundly, no distress   HENT:      Head: Normocephalic.      Nose: Nose normal.      Mouth/Throat:      Mouth: Mucous membranes are moist.      Pharynx: Oropharynx is clear.   Eyes:      Pupils: Pupils are equal, round, and reactive to light.   Cardiovascular:      Rate and Rhythm: Normal rate and regular rhythm.      Heart sounds: Normal heart sounds.   Pulmonary:      Effort: Pulmonary effort is normal. No respiratory distress.      Breath sounds: Normal breath sounds. No wheezing or rales.   Chest:      Chest wall: No tenderness.   Abdominal:      General: Bowel sounds are normal. There is no distension.      Palpations: Abdomen is soft.      Tenderness: There is no abdominal tenderness.   Musculoskeletal:      Cervical back: Neck supple.      Right lower leg: No edema.      Left lower leg: No edema.   Skin:     General: Skin is warm and dry.      Capillary Refill: Capillary refill takes less than 2 seconds.      Coloration: Skin is pale.   Neurological:      Motor: Weakness present.      Comments: Somnolent, snoring  Awakens with stimulation, but goes right back to sleep  Not following any commands at this time   Psychiatric:      Comments: Not engaging in conversation at this time               Significant Labs: All pertinent labs within the past 24 hours have been reviewed.    Significant Imaging: I have reviewed all pertinent imaging results/findings within the past 24 hours.      Assessment & Plan  Diverticulitis  Cont rocephin and flagyl  No surgery per surgery  Diet initiated  Cont to monitor overnight for stability  Dc in am if stable    Abnormal abdominal CT scan  Possible colovaginal fistula--per CT scan report  General surgery consult pending    Anxiety and depression  Supportive care  Medications on hold due to somnolence and NPO    Hemiplegia and hemiparesis following cerebral infarction affecting left non-dominant side  Supportive care  Fall precautions    Nonischemic cardiomyopathy  Monitor fluid  volume status closely  Per records -- history of Takotsubo cardiomyopathy (apical ballooning syndrome)  No echocardiogram results found for the past 12 months    Echo from 2022  The left ventricle is normal in size with concentric remodeling and normal systolic function.  The estimated ejection fraction is 60%.  Normal left ventricular diastolic function.  Normal right ventricular size with normal right ventricular systolic function.  Mild aortic regurgitation.  There is moderate pulmonary hypertension.  Normal central venous pressure (3 mmHg).  The estimated PA systolic pressure is 52 mmHg.      Stage 3a chronic kidney disease  Creatine stable for now. BMP reviewed- noted Estimated Creatinine Clearance: 49.6 mL/min (based on SCr of 0.7 mg/dL). according to latest data. Based on current GFR, CKD stage is stage 3 - GFR 30-59.  Monitor UOP and serial BMP and adjust therapy as needed. Renally dose meds. Avoid nephrotoxic medications and procedures.  Essential hypertension  Patient's blood pressure range in the last 24 hours was: BP  Min: 138/62  Max: 193/81.The patient's inpatient anti-hypertensive regimen is listed below:  Current Antihypertensives  hydrALAZINE injection 10 mg, Every 6 hours PRN, Intravenous  metoprolol succinate (TOPROL-XL) 24 hr split tablet 12.5 mg, Daily, Oral    Plan  - BP is controlled, no changes needed to their regimen -- at this time,  holding home meds while NPO  - PRN IV hydralazine ordered  Alzheimer's disease, unspecified (CODE)  Resume home medications when no longer NPO  Anemia  Chronic.  Most recent hemoglobin and hematocrit are listed below.  Recent Labs     05/09/25  0704 05/10/25  0722 05/10/25  1233   HGB 8.9* 7.9* 8.0*   HCT 29.6* 25.7* 26.0*     Plan  - Monitor serial CBC: Daily  - Transfuse PRBC if patient becomes hemodynamically unstable, symptomatic or H/H drops below 7/21.    05/10/2025  No bleeding reported   Will check iron studies   Transfuse as indicated  Elevated  troponin I level  Minimally elevated  Will trend  Cardiac monitoring ordered    VTE Risk Mitigation (From admission, onward)           Ordered     Reason for No Pharmacological VTE Prophylaxis  Once        Comments: Anemia, bleeding risk   Question:  Reasons:  Answer:  Physician Provided (leave comment)    05/09/25 0114     IP VTE HIGH RISK PATIENT  Once         05/09/25 0114     Place sequential compression device  Until discontinued         05/09/25 0114                    Discharge Planning   JOSE:      Code Status: DNR   Medical Readiness for Discharge Date:   Discharge Plan A: Return to nursing home              Sammy Juarez MD  Department of Hospital Medicine   [facility] - Med Surg

## 2025-05-10 NOTE — ASSESSMENT & PLAN NOTE
85yo F with possible diverticulitis    - Patient currently asymptomatic from an abdominal standpoint.  Has no abdominal pain.  Has no vaginal drainage of stool or flatus.  Even if patient were to have a colovaginal fistula, after discussion with the patient's daughter, she would likely not want surgical intervention which is reasonable given the patient's clinical condition.  Would treat with IV antibiotics for now to see if any infection can be cleared.  - okay for diet from surgical standpoint as there does not appear to be acute diverticulitis this time  - rest of care per primary team  -disposition per primary team

## 2025-05-10 NOTE — ASSESSMENT & PLAN NOTE
Cont rocephin and flagyl  No surgery per surgery  Diet initiated  Cont to monitor overnight for stability  Dc in am if stable

## 2025-05-10 NOTE — SUBJECTIVE & OBJECTIVE
Interval History: No acute issues overnight.     Review of Systems   Constitutional:  Negative for fatigue and fever.   HENT:  Negative for sinus pressure.    Eyes:  Negative for visual disturbance.   Respiratory:  Negative for shortness of breath.    Cardiovascular:  Negative for chest pain.   Gastrointestinal:  Negative for nausea and vomiting.   Genitourinary:  Negative for difficulty urinating.   Musculoskeletal:  Negative for back pain.   Skin:  Negative for rash.   Neurological:  Negative for headaches.   Psychiatric/Behavioral:  Negative for confusion.      Objective:     Vital Signs (Most Recent):  Temp: 99.4 °F (37.4 °C) (05/10/25 1150)  Pulse: 73 (05/10/25 1150)  Resp: 20 (05/10/25 1150)  BP: (!) 180/77 (05/10/25 1150)  SpO2: (!) 90 % (05/10/25 1150) Vital Signs (24h Range):  Temp:  [98.4 °F (36.9 °C)-99.4 °F (37.4 °C)] 99.4 °F (37.4 °C)  Pulse:  [57-77] 73  Resp:  [17-20] 20  SpO2:  [89 %-93 %] 90 %  BP: (138-193)/(62-81) 180/77     Weight: 64.6 kg (142 lb 6.7 oz)  Body mass index is 26.91 kg/m².    Intake/Output Summary (Last 24 hours) at 5/10/2025 1407  Last data filed at 5/10/2025 0400  Gross per 24 hour   Intake --   Output 901 ml   Net -901 ml         Physical Exam  Vitals reviewed.   Constitutional:       General: She is not in acute distress.     Appearance: She is ill-appearing. She is not diaphoretic.      Comments: Chronically ill-appearing elderly female, sleeping soundly, no distress   HENT:      Head: Normocephalic.      Nose: Nose normal.      Mouth/Throat:      Mouth: Mucous membranes are moist.      Pharynx: Oropharynx is clear.   Eyes:      Pupils: Pupils are equal, round, and reactive to light.   Cardiovascular:      Rate and Rhythm: Normal rate and regular rhythm.      Heart sounds: Normal heart sounds.   Pulmonary:      Effort: Pulmonary effort is normal. No respiratory distress.      Breath sounds: Normal breath sounds. No wheezing or rales.   Chest:      Chest wall: No tenderness.    Abdominal:      General: Bowel sounds are normal. There is no distension.      Palpations: Abdomen is soft.      Tenderness: There is no abdominal tenderness.   Musculoskeletal:      Cervical back: Neck supple.      Right lower leg: No edema.      Left lower leg: No edema.   Skin:     General: Skin is warm and dry.      Capillary Refill: Capillary refill takes less than 2 seconds.      Coloration: Skin is pale.   Neurological:      Motor: Weakness present.      Comments: Somnolent, snoring  Awakens with stimulation, but goes right back to sleep  Not following any commands at this time   Psychiatric:      Comments: Not engaging in conversation at this time               Significant Labs: All pertinent labs within the past 24 hours have been reviewed.    Significant Imaging: I have reviewed all pertinent imaging results/findings within the past 24 hours.

## 2025-05-10 NOTE — ASSESSMENT & PLAN NOTE
Chronic.  Most recent hemoglobin and hematocrit are listed below.  Recent Labs     05/09/25  0704 05/10/25  0722 05/10/25  1233   HGB 8.9* 7.9* 8.0*   HCT 29.6* 25.7* 26.0*     Plan  - Monitor serial CBC: Daily  - Transfuse PRBC if patient becomes hemodynamically unstable, symptomatic or H/H drops below 7/21.    05/10/2025  No bleeding reported   Will check iron studies   Transfuse as indicated

## 2025-05-10 NOTE — SUBJECTIVE & OBJECTIVE
Interval History:  No complaints.  Patient does have dementia.      There were no plans for surgery with regard to the colovaginal fistula    Medications:  Continuous Infusions:  Scheduled Meds:   cefTRIAXone (Rocephin) IV (PEDS and ADULTS)  2 g Intravenous Q24H    metroNIDAZOLE IV (PEDS and ADULTS)  500 mg Intravenous Q8H     PRN Meds:  Current Facility-Administered Medications:     albuterol-ipratropium, 3 mL, Nebulization, Q6H PRN    dextrose 50%, 12.5 g, Intravenous, PRN    dextrose 50%, 25 g, Intravenous, PRN    glucagon (human recombinant), 1 mg, Intramuscular, PRN    glucose, 16 g, Oral, PRN    glucose, 24 g, Oral, PRN    hydrALAZINE, 10 mg, Intravenous, Q6H PRN    naloxone, 0.02 mg, Intravenous, PRN    ondansetron, 4 mg, Intravenous, Q8H PRN    sodium chloride 0.9%, 3 mL, Intravenous, Q8H PRN     Review of patient's allergies indicates:   Allergen Reactions    Amitriptyline     Hydrochlorothiazide      Causes muscle cramping    Lisinopril      hyperkalemia    Opioids - morphine analogues Hallucinations     Chest pain and hallucinations    Oxycodone     Percocet [oxycodone-acetaminophen] Other (See Comments)     Seizures    Belbuca [buprenorphine hcl] Nausea And Vomiting and Other (See Comments)     Black out     Codeine Nausea Only and Rash    Prazosin Other (See Comments)     dizziness     Objective:     Vital Signs (Most Recent):  Temp: 98.7 °F (37.1 °C) (05/10/25 0815)  Pulse: 77 (05/10/25 0815)  Resp: 20 (05/10/25 0815)  BP: (!) 154/66 (05/10/25 0815)  SpO2: (!) 89 % (05/10/25 0815) Vital Signs (24h Range):  Temp:  [98 °F (36.7 °C)-98.7 °F (37.1 °C)] 98.7 °F (37.1 °C)  Pulse:  [56-77] 77  Resp:  [17-20] 20  SpO2:  [89 %-96 %] 89 %  BP: (138-193)/(62-81) 154/66     Weight: 64.6 kg (142 lb 6.7 oz)  Body mass index is 26.91 kg/m².    Intake/Output - Last 3 Shifts         05/08 0700 05/09 0659 05/09 0700  05/10 0659 05/10 0700  05/11 0659    IV Piggyback 1638      Total Intake(mL/kg) 1638 (25.3)      Urine  (mL/kg/hr) 800 1350 (0.9)     Stool  1     Total Output 800 1351     Net +838 -1351            Unmeasured Urine Occurrence  0 x              Physical Exam  Vitals reviewed.   Constitutional:       Appearance: She is well-developed. Ill appearance: More chronic.   HENT:      Head: Normocephalic.   Eyes:      Pupils: Pupils are equal, round, and reactive to light.   Neck:      Thyroid: No thyromegaly.      Vascular: No JVD.      Trachea: No tracheal deviation.   Cardiovascular:      Rate and Rhythm: Normal rate and regular rhythm.      Heart sounds: Normal heart sounds.   Pulmonary:      Breath sounds: Normal breath sounds. No wheezing.   Abdominal:      General: Bowel sounds are normal. There is no distension.      Palpations: Abdomen is soft. Abdomen is not rigid. There is no mass.      Tenderness: There is no abdominal tenderness. There is no guarding or rebound.      Comments: Long midline scar   Musculoskeletal:         General: Normal range of motion.   Lymphadenopathy:      Cervical: No cervical adenopathy.   Skin:     General: Skin is warm and dry.      Findings: No erythema or rash.   Neurological:      Mental Status: She is alert.          Significant Labs:  I have reviewed all pertinent lab results within the past 24 hours.  CBC:   Recent Labs   Lab 05/10/25  0722   WBC 11.49   RBC 2.90*   HGB 7.9*   HCT 25.7*      MCV 89   MCH 27.2   MCHC 30.7*     BMP:   Recent Labs   Lab 05/09/25  0704   GLU 94      K 2.9*      CO2 31*   BUN 17   CREATININE 0.7   CALCIUM 8.6*   MG 1.8       Significant Diagnostics:  I have reviewed all pertinent imaging results/findings within the past 24 hours.  No new

## 2025-05-10 NOTE — ASSESSMENT & PLAN NOTE
Creatine stable for now. BMP reviewed- noted Estimated Creatinine Clearance: 49.6 mL/min (based on SCr of 0.7 mg/dL). according to latest data. Based on current GFR, CKD stage is stage 3 - GFR 30-59.  Monitor UOP and serial BMP and adjust therapy as needed. Renally dose meds. Avoid nephrotoxic medications and procedures.

## 2025-05-11 VITALS
DIASTOLIC BLOOD PRESSURE: 70 MMHG | BODY MASS INDEX: 26.89 KG/M2 | TEMPERATURE: 98 F | HEART RATE: 80 BPM | WEIGHT: 142.44 LBS | SYSTOLIC BLOOD PRESSURE: 157 MMHG | OXYGEN SATURATION: 99 % | RESPIRATION RATE: 18 BRPM | HEIGHT: 61 IN

## 2025-05-11 LAB
ABSOLUTE EOSINOPHIL (OHS): 0.32 K/UL
ABSOLUTE MONOCYTE (OHS): 1.21 K/UL (ref 0.3–1)
ABSOLUTE NEUTROPHIL COUNT (OHS): 6.23 K/UL (ref 1.8–7.7)
ALBUMIN SERPL BCP-MCNC: 1.6 G/DL (ref 3.5–5.2)
ALP SERPL-CCNC: 72 UNIT/L (ref 40–150)
ALT SERPL W/O P-5'-P-CCNC: 30 UNIT/L (ref 10–44)
ANION GAP (OHS): 9 MMOL/L (ref 8–16)
AST SERPL-CCNC: 38 UNIT/L (ref 11–45)
BASOPHILS # BLD AUTO: 0.04 K/UL
BASOPHILS NFR BLD AUTO: 0.4 %
BILIRUB SERPL-MCNC: 0.2 MG/DL (ref 0.1–1)
BUN SERPL-MCNC: 20 MG/DL (ref 8–23)
CALCIUM SERPL-MCNC: 8.4 MG/DL (ref 8.7–10.5)
CHLORIDE SERPL-SCNC: 106 MMOL/L (ref 95–110)
CO2 SERPL-SCNC: 28 MMOL/L (ref 23–29)
CREAT SERPL-MCNC: 0.8 MG/DL (ref 0.5–1.4)
ERYTHROCYTE [DISTWIDTH] IN BLOOD BY AUTOMATED COUNT: 15.4 % (ref 11.5–14.5)
GFR SERPLBLD CREATININE-BSD FMLA CKD-EPI: >60 ML/MIN/1.73/M2
GLUCOSE SERPL-MCNC: 105 MG/DL (ref 70–110)
HCT VFR BLD AUTO: 28.4 % (ref 37–48.5)
HGB BLD-MCNC: 8.4 GM/DL (ref 12–16)
IMM GRANULOCYTES # BLD AUTO: 0.06 K/UL (ref 0–0.04)
IMM GRANULOCYTES NFR BLD AUTO: 0.6 % (ref 0–0.5)
LYMPHOCYTES # BLD AUTO: 2.21 K/UL (ref 1–4.8)
MAGNESIUM SERPL-MCNC: 1.9 MG/DL (ref 1.6–2.6)
MCH RBC QN AUTO: 26.9 PG (ref 27–31)
MCHC RBC AUTO-ENTMCNC: 29.6 G/DL (ref 32–36)
MCV RBC AUTO: 91 FL (ref 82–98)
NUCLEATED RBC (/100WBC) (OHS): 0 /100 WBC
OHS QRS DURATION: 70 MS
OHS QRS DURATION: 70 MS
OHS QTC CALCULATION: 434 MS
OHS QTC CALCULATION: 461 MS
PLATELET # BLD AUTO: 293 K/UL (ref 150–450)
PMV BLD AUTO: 9.6 FL (ref 9.2–12.9)
POTASSIUM SERPL-SCNC: 3.7 MMOL/L (ref 3.5–5.1)
PROT SERPL-MCNC: 6.8 GM/DL (ref 6–8.4)
RBC # BLD AUTO: 3.12 M/UL (ref 4–5.4)
RELATIVE EOSINOPHIL (OHS): 3.2 %
RELATIVE LYMPHOCYTE (OHS): 21.9 % (ref 18–48)
RELATIVE MONOCYTE (OHS): 12 % (ref 4–15)
RELATIVE NEUTROPHIL (OHS): 61.9 % (ref 38–73)
SODIUM SERPL-SCNC: 143 MMOL/L (ref 136–145)
WBC # BLD AUTO: 10.07 K/UL (ref 3.9–12.7)

## 2025-05-11 PROCEDURE — 51798 US URINE CAPACITY MEASURE: CPT

## 2025-05-11 PROCEDURE — 80053 COMPREHEN METABOLIC PANEL: CPT | Performed by: NURSE PRACTITIONER

## 2025-05-11 PROCEDURE — 25000003 PHARM REV CODE 250: Performed by: FAMILY MEDICINE

## 2025-05-11 PROCEDURE — 36415 COLL VENOUS BLD VENIPUNCTURE: CPT | Performed by: NURSE PRACTITIONER

## 2025-05-11 PROCEDURE — 85025 COMPLETE CBC W/AUTO DIFF WBC: CPT | Performed by: NURSE PRACTITIONER

## 2025-05-11 PROCEDURE — 83735 ASSAY OF MAGNESIUM: CPT | Performed by: NURSE PRACTITIONER

## 2025-05-11 PROCEDURE — 63600175 PHARM REV CODE 636 W HCPCS: Performed by: NURSE PRACTITIONER

## 2025-05-11 RX ORDER — AMOXICILLIN AND CLAVULANATE POTASSIUM 875; 125 MG/1; MG/1
1 TABLET, FILM COATED ORAL 2 TIMES DAILY
Start: 2025-05-11 | End: 2025-05-18

## 2025-05-11 RX ORDER — LANOLIN ALCOHOL/MO/W.PET/CERES
1 CREAM (GRAM) TOPICAL DAILY
Status: DISCONTINUED | OUTPATIENT
Start: 2025-05-11 | End: 2025-05-11 | Stop reason: HOSPADM

## 2025-05-11 RX ORDER — FERROUS SULFATE 324(65)MG
324 TABLET, DELAYED RELEASE (ENTERIC COATED) ORAL DAILY
Start: 2025-05-11 | End: 2025-06-10

## 2025-05-11 RX ADMIN — METOPROLOL SUCCINATE 12.5 MG: 25 TABLET, EXTENDED RELEASE ORAL at 09:05

## 2025-05-11 RX ADMIN — METRONIDAZOLE 500 MG: 5 INJECTION, SOLUTION INTRAVENOUS at 01:05

## 2025-05-11 RX ADMIN — FERROUS SULFATE TAB 325 MG (65 MG ELEMENTAL FE) 1 EACH: 325 (65 FE) TAB at 11:05

## 2025-05-11 RX ADMIN — FLUOXETINE HYDROCHLORIDE 10 MG: 10 CAPSULE ORAL at 09:05

## 2025-05-11 RX ADMIN — CEFTRIAXONE SODIUM 2 G: 2 INJECTION, POWDER, FOR SOLUTION INTRAMUSCULAR; INTRAVENOUS at 06:05

## 2025-05-11 RX ADMIN — ASPIRIN 81 MG CHEWABLE TABLET 81 MG: 81 TABLET CHEWABLE at 09:05

## 2025-05-11 RX ADMIN — MEMANTINE 10 MG: 10 TABLET ORAL at 09:05

## 2025-05-11 RX ADMIN — METRONIDAZOLE 500 MG: 5 INJECTION, SOLUTION INTRAVENOUS at 10:05

## 2025-05-11 RX ADMIN — SODIUM CHLORIDE: 9 INJECTION, SOLUTION INTRAVENOUS at 09:05

## 2025-05-11 NOTE — NURSING
Called and gave report to vincent at [facility]. She stated they would set up transport.

## 2025-05-11 NOTE — PLAN OF CARE
[facility] - Med [facility]urg  Discharge Final Note    Primary Care Provider: No primary care provider on file.    Expected Discharge Date: 5/11/2025    Final Discharge Note (most recent)       Final Note - 05/11/25 1129          Final Note    Assessment Type Final Discharge Note     Anticipated Discharge Disposition [facility] Nursing Facility        Post-Acute Status    Discharge Delays None known at this time                     Important Message from Medicare             After-discharge care                Destination       *[facility]   Service: Nursing Home    [id number]  [facility] [address]   Phone: [phone]                     Discharge home to [facility] as group home patient.

## 2025-05-11 NOTE — ASSESSMENT & PLAN NOTE
Creatine stable for now. BMP reviewed- noted Estimated Creatinine Clearance: 43.4 mL/min (based on SCr of 0.8 mg/dL). according to latest data. Based on current GFR, CKD stage is stage 3 - GFR 30-59.  Monitor UOP and serial BMP and adjust therapy as needed. Renally dose meds. Avoid nephrotoxic medications and procedures.

## 2025-05-11 NOTE — PLAN OF CARE
Discussed poc with pt, pt verbalized understanding    Purposeful rounding every 2hours    VS wnl  Cardiac monitoring in use, pt is NSR, tele monitor # [id number]  Fall precautions in place, remains injury free  Pain and nausea under control with PRN meds    IVFs  Accurate I&Os  Abx given as prescribed  Bed locked at lowest position  Call light within reach    Chart check complete  Will cont with POC

## 2025-05-11 NOTE — PLAN OF CARE
Pt being discharged Nursing Home in stable condition. IV removed, catheter intact, pt tolerated well. Tele monitor removed, given to US. Discharge instructions faxed to facility.

## 2025-05-11 NOTE — DISCHARGE SUMMARY
[facility] [place] Medicine  Discharge Summary      Patient Name: Leslie Wood  MRN: [id number]  [facility]: [id number]  Patient Class: IP- Inpatient  Admission Date: 5/8/2025  Hospital Length of Stay: 2 days  Discharge Date and Time: 05/11/2025 11:56 AM  Attending Physician: Sammy Juarez MD   Discharging Provider: Sammy Juarez MD  Primary Care Provider: No primary care provider on file.    Primary Care Team: Networked reference to record PCT     HPI:     Patient is 84-year-old female with past medical history significant for Alzheimer dementia, arthritis, cataracts, anxiety, depression, nonischemic cardiomyopathy, CKD stage IIIA, stroke with left hemiparesis, cellulitis, hypertension, MI, urinary tract infections, and elevated LFT's with recent admission from 4/25 to 4/29 due to cellulitis of LUE treated with Rocephin and clindamycin as well as diastolic congestive heart failure exacerbation, UTI, and hypertensive urgency discharged on cephalexin 500 mg BID, went back to [facility], now brought to ED for evaluation of fever and lethargy, with staff reporting that she is not as alert as she normally is. She was also found to have SpO2 90% on room air and was placed on oxygen prior to arrival in ED. She also complained of weakness and dizziness.  on arrival to ED, temp 99.4°, heart rate 80, respirations 20, blood pressure 161/60, 97% SpO2 on 2 L nasal cannula.  Lab workup revealed WBC 13.25, RBC 3.46, hemoglobin 9.5, hematocrit 30.8, platelets 286, sodium 140, potassium 3.5, chloride 101, CO2 30, BUN 18, creatinine 0.8, glucose 128, magnesium 1.9, alk-phos 80, albumin 1.9, AST 51, ALT 32, lipase 30, , troponin 0.058, lactic acid 0.8, procalcitonin 0.17, negative COVID-19 and influenza A/B screenings, UA done and 1+ leukocyte esterase, WBC 29, no bacteria noted, negative nitrite. ABG done and pH 7.487, PCO2 45.1, pO2 114, HCO2 34.1, 99% - on nasal cannula. Blood cultures x 2 and urine culture pending. CXR did not  reveal any definite infiltrates. CT chest/abdomen/pelvis with IV contrast done which showed small left pleural effusion, atelectasis, probable cystitis, possible diverticulitis, and inflammatory changes extending to vaginal cuff on the left raising possibility of colovaginal fistula. While in ED, she was given IV fluid bolus 1638 ml, IV cefepime, and then later given Lasix 40 mg IV. General Surgery was consulted per ED and recommended admission per hospital medicine.    * No surgery found *      Hospital Course:   Patient was admitted for diverticulitis.  CT scan was concerning for colovaginal fistula.  Patient relatively asymptomatic and high risk for surgical repair.  No surgery recommended at this time.  She was initiated on Rocephin and Flagyl.  Transitioned to p.o. Augmentin.  H&H remained stable.  Iron-deficiency noted.  Ferrous sulfate initiated.  Patient is discharged back to nursing home.         Goals of Care Treatment Preferences:  Code Status: DNR    Health care agent: see file  Health care agent number: see file          What is most important right now is to focus on comfort and QOL .         SDOH Screening:  The patient was unable to be screened for utility difficulties, food insecurity, transport difficulties, housing insecurity, and interpersonal safety, so no concerns could be identified this admission.     Consults:   Consults (From admission, onward)          Status Ordering Provider     Case Management/  Once        Provider:  (Not yet assigned)    Completed [facility]     Inpatient consult to General surgery  Once        Provider:  Josue Dickey MD    Completed PRUDENCIO REYES            Assessment & Plan  Diverticulitis  Cont rocephin and flagyl  No surgery per surgery  Diet initiated  Cont to monitor overnight for stability  Dc in am if stable    Abnormal abdominal CT scan  Possible colovaginal fistula--per CT scan report  General surgery consult  pending    Anxiety and depression  Supportive care  Medications on hold due to somnolence and NPO    Hemiplegia and hemiparesis following cerebral infarction affecting left non-dominant side  Supportive care  Fall precautions    Nonischemic cardiomyopathy  Monitor fluid volume status closely  Per records -- history of Takotsubo cardiomyopathy (apical ballooning syndrome)  No echocardiogram results found for the past 12 months    Echo from 2022  The left ventricle is normal in size with concentric remodeling and normal systolic function.  The estimated ejection fraction is 60%.  Normal left ventricular diastolic function.  Normal right ventricular size with normal right ventricular systolic function.  Mild aortic regurgitation.  There is moderate pulmonary hypertension.  Normal central venous pressure (3 mmHg).  The estimated PA systolic pressure is 52 mmHg.      Stage 3a chronic kidney disease  Creatine stable for now. BMP reviewed- noted Estimated Creatinine Clearance: 43.4 mL/min (based on SCr of 0.8 mg/dL). according to latest data. Based on current GFR, CKD stage is stage 3 - GFR 30-59.  Monitor UOP and serial BMP and adjust therapy as needed. Renally dose meds. Avoid nephrotoxic medications and procedures.  Essential hypertension  Patient's blood pressure range in the last 24 hours was: BP  Min: 135/68  Max: 202/82.The patient's inpatient anti-hypertensive regimen is listed below:  Current Antihypertensives  metoprolol succinate (TOPROL-XL) 24 hr split tablet 12.5 mg, Daily, Oral  labetaloL injection 10 mg, Every 4 hours PRN, Intravenous  hydrALAZINE injection 10 mg, Every 6 hours PRN, Intravenous    Plan  - BP is controlled, no changes needed to their regimen -- at this time,  holding home meds while NPO  - PRN IV hydralazine ordered  Alzheimer's disease, unspecified (CODE)  Resume home medications when no longer NPO  Anemia  Chronic.  Most recent hemoglobin and hematocrit are listed below.  Recent Labs      05/10/25  0722 05/10/25  1233 05/11/25  0637   HGB 7.9* 8.0* 8.4*   HCT 25.7* 26.0* 28.4*     Plan  - Monitor serial CBC: Daily  - Transfuse PRBC if patient becomes hemodynamically unstable, symptomatic or H/H drops below 7/21.    05/11/2025  No bleeding reported   Will check iron studies   Transfuse as indicated  Elevated troponin I level  Minimally elevated  Will trend  Cardiac monitoring ordered    Final Active Diagnoses:    Diagnosis Date Noted POA    PRINCIPAL PROBLEM:  Diverticulitis [K57.92] 05/09/2025 Yes    Abnormal abdominal CT scan [R93.5] 05/09/2025 Yes    Anemia [D64.9] 04/28/2025 Yes    Elevated troponin I level [R79.89] 04/25/2025 Yes    Alzheimer's disease, unspecified (CODE) [G30.9]  Yes    Essential hypertension [I10] 12/12/2024 Yes    Stage 3a chronic kidney disease [N18.31] 08/05/2024 Yes    Nonischemic cardiomyopathy [I42.8]  Yes    Hemiplegia and hemiparesis following cerebral infarction affecting left non-dominant side [I69.354] 03/04/2021 Not Applicable    Anxiety and depression [F41.9, F32.A] 09/06/2016 Yes      Problems Resolved During this Admission:       Discharged Condition: good    Disposition:     Follow Up:   Contact information for after-discharge care       Destination       [facility] .    Service: Nursing Home  Contact information:  [address] [place] [address] Sridhar Avila.  [facility] [address] [phone]                                 Patient Instructions:   No discharge procedures on file.    Significant Diagnostic Studies: N/A    Pending Diagnostic Studies:       Procedure Component Value Units Date/Time    HCV Virus Hold Specimen [[id number]] Collected: 05/08/25 1823    Order Status: Sent Lab Status: In process Updated: 05/08/25 1828    Specimen: Blood            Medications:  Reconciled Home Medications:      Medication List        START taking these medications      amoxicillin-clavulanate 875-125mg 875-125 mg per tablet  Commonly known as: AUGMENTIN  Take 1  tablet by mouth 2 (two) times daily. for 7 days     ferrous sulfate 324 mg (65 mg iron) Tbec  Take 1 tablet (324 mg total) by mouth once daily.            CONTINUE taking these medications      aspirin 81 MG Chew  Take 81 mg by mouth once daily.     atorvastatin 10 MG tablet  Commonly known as: LIPITOR  Take 1 tablet (10 mg total) by mouth every evening.     cyanocobalamin 1000 MCG tablet  Commonly known as: VITAMIN B-12  Take 1 tablet (1,000 mcg total) by mouth once daily.     donepeziL 10 MG tablet  Commonly known as: ARICEPT  TAKE 1 TABLET BY MOUTH EVERY EVENING     FLUoxetine 10 MG capsule  Take 10 mg by mouth once daily.     furosemide 20 MG tablet  Commonly known as: LASIX  Take 1 tablet (20 mg total) by mouth once daily.     LIDOcaine 5 %  Commonly known as: LIDODERM  Place 1 patch onto the skin every 24 hours. Apply to right flank topically one time a day for 7 days. Remove & Discard patch within 12 hours or as directed by MD     losartan 50 MG tablet  Commonly known as: COZAAR  Take 1 tablet (50 mg total) by mouth 2 (two) times daily.     memantine 10 MG Tab  Commonly known as: NAMENDA  TAKE 1 TABLET BY MOUTH TWICE DAILY     metoprolol succinate 25 MG 24 hr tablet  Commonly known as: TOPROL-XL  Take ½ tablet (12.5 mg total) by mouth once daily.     multivitamin Tab  Take 1 tablet by mouth once daily.     OLANZapine 5 MG tablet  Commonly known as: ZyPREXA  Take 1 tablet (5 mg total) by mouth every evening.            STOP taking these medications      b complex vitamins capsule              Indwelling Lines/Drains at time of discharge:   Lines/Drains/Airways       Drain  Duration             Female External Urinary Catheter w/ Suction 05/08/25 1908 2 days                    Time spent on the discharge of patient: 37 minutes         Sammy Juarez MD  Department of Hospital Medicine  [place] [facility]

## 2025-05-11 NOTE — PLAN OF CARE
Call placed to the [facility], notified staff of planned return to facility. Nurse requested orders be faxed to [phone].

## 2025-05-11 NOTE — PLAN OF CARE
Discussed poc with pt, pt verbalized understanding    Purposeful rounding every 2hours    VS monitored for need of PRN interventions   Cardiac monitoring in use, pt is NSR, tele monitor # [id number]  Fall precautions in place, remains injury free  Pt denies c/o n/v   Pain under control with PRN meds.  Pt. C/o 8/10 chest pain at 2047.  Myranda Gray NP notified, orders placed and followed.        IVFs  Accurate I&Os  Abx given as prescribed  Bed locked at lowest position  Call light within reach    Chart check complete  Will cont with POC

## 2025-05-11 NOTE — SIGNIFICANT EVENT
Complains of chest pain 8/10  Has multiple allergies to pain medications listed in chart  NTG SL x one dose ordered, monitor response  Check EKG STAT  Troponin STAT  Already on ASA, statin, and beta blocker

## 2025-05-11 NOTE — ASSESSMENT & PLAN NOTE
Patient's blood pressure range in the last 24 hours was: BP  Min: 135/68  Max: 202/82.The patient's inpatient anti-hypertensive regimen is listed below:  Current Antihypertensives  metoprolol succinate (TOPROL-XL) 24 hr split tablet 12.5 mg, Daily, Oral  labetaloL injection 10 mg, Every 4 hours PRN, Intravenous  hydrALAZINE injection 10 mg, Every 6 hours PRN, Intravenous    Plan  - BP is controlled, no changes needed to their regimen -- at this time,  holding home meds while NPO  - PRN IV hydralazine ordered

## 2025-05-11 NOTE — ASSESSMENT & PLAN NOTE
Chronic.  Most recent hemoglobin and hematocrit are listed below.  Recent Labs     05/10/25  0722 05/10/25  1233 05/11/25  0637   HGB 7.9* 8.0* 8.4*   HCT 25.7* 26.0* 28.4*     Plan  - Monitor serial CBC: Daily  - Transfuse PRBC if patient becomes hemodynamically unstable, symptomatic or H/H drops below 7/21.    05/11/2025  No bleeding reported   Will check iron studies   Transfuse as indicated

## 2025-05-14 LAB
BACTERIA BLD CULT: NORMAL
BACTERIA BLD CULT: NORMAL

## 2025-05-29 NOTE — ASSESSMENT & PLAN NOTE
Hospitalist Progress Note   Admit Date:  2025 12:33 AM   Name:  Florentino Rogers   Age:  62 y.o.  Sex:  male  :  1962   MRN:  [id number]   Room:  [facility]/    Presenting/Chief Complaint: Chest Pain     Reason(s) for Admission: Chronic pain syndrome [G89.4]  Atypical chest pain [R07.89]  Acute kidney injury [N17.9]  Noncompliance w/medication treatment due to intermit use of medication [Z91.148]  Diabetes mellitus due to underlying condition, uncontrolled, with hyperglycemia (HCC) [E08.65]  Hyperglycemia due to diabetes mellitus (HCC) [E11.65]     Hospital Course:   Florentino Rogers is a 62 y.o. male with medical history of   DM with neuropathy   Non-compliance   dCHF   CKD stage III   COPD   Hypertension   Bilateral below-knee amputation     who presented with sharp left-sided chest pain.     Patient did not take DM medications for 4 days without clear reasons. BS on admission was extremely high.   Patient was treated with insulin.     Troponin was up, but without rising values.     EKG shows non-specific changes. .    Subjective & 24hr Events:     Patient is seen and examined at bedside.    Patient is sitting up in bed.   Patient is feeling better after admission.   No chest pain.   No shortness of breath now.      Assessment & Plan:     Principal Problem:    Diabetes mellitus due to underlying condition, uncontrolled, with hyperglycemia (HCC)    Uncontrolled type 2 diabetes mellitus with hyperglycemia (HCC)  Plan:   Continue with Lantus.   I increased Lantus dose again as BS is still high.   Monitor blood sugar.  Cover with insulin sliding scale accordingly.      Active Problems:    Hyperlipidemia  Plan:   On atorvastatin      Primary hypertension  Plan:   Patient is on amlodipine  On metoprolol  Blood pressure has improved but sometimes.   Monitor closely.       Obesity (BMI 30-39.9)  Plan:   Patient will be counseled for weight loss and maintenance of healthy weight when recovering from this acute illness.  Patient presented with acute onset epigastric abdominal pain concerning for atypical chest pain.  Initial troponin negative with EKG negative for evidence of acute ischemia.  Chest x-ray negative for acute findings.  Patient with significant history including prior MI with elevated heart score.  Patient admitted to telemetry for continued cardiac monitoring and ACS rule out.  Plan:  -telemetry  -continue home medications  -trend troponin  -serial EKGs at onset or worsening chest pain  -MELI therapy p.r.n.  -f/u cardiology     calcium carbonate (TUMS) chewable tablet 500 mg  500 mg Oral TID PRN       Signed:  Sabina Flores MD    Part of this note may have been written by using a voice dictation software.  The note has been proof read but may still contain some grammatical/other typographical errors.

## 2025-06-22 ENCOUNTER — HOSPITAL ENCOUNTER (OUTPATIENT)
Facility: HOSPITAL | Age: 85
End: 2025-06-23
Attending: EMERGENCY MEDICINE | Admitting: HOSPITALIST
Payer: MEDICARE

## 2025-06-22 DIAGNOSIS — R06.00 DYSPNEA: ICD-10-CM

## 2025-06-22 DIAGNOSIS — R10.13 EPIGASTRIC PAIN: ICD-10-CM

## 2025-06-22 DIAGNOSIS — R07.9 CHEST PAIN: ICD-10-CM

## 2025-06-22 DIAGNOSIS — R79.89 ELEVATED TROPONIN: Primary | ICD-10-CM

## 2025-06-22 DIAGNOSIS — S22.21XA CLOSED FRACTURE OF MANUBRIUM, INITIAL ENCOUNTER: ICD-10-CM

## 2025-06-22 PROBLEM — R13.10 DYSPHAGIA: Status: ACTIVE | Noted: 2025-06-22

## 2025-06-22 LAB
ABSOLUTE EOSINOPHIL (OHS): 0.13 K/UL
ABSOLUTE MONOCYTE (OHS): 0.9 K/UL (ref 0.3–1)
ABSOLUTE NEUTROPHIL COUNT (OHS): 10.45 K/UL (ref 1.8–7.7)
ALBUMIN SERPL BCP-MCNC: 2.9 G/DL (ref 3.5–5.2)
ALLENS TEST: ABNORMAL
ALP SERPL-CCNC: 84 UNIT/L (ref 40–150)
ALT SERPL W/O P-5'-P-CCNC: 32 UNIT/L (ref 10–44)
ANION GAP (OHS): 11 MMOL/L (ref 8–16)
AST SERPL-CCNC: 38 UNIT/L (ref 11–45)
BASOPHILS # BLD AUTO: 0.05 K/UL
BASOPHILS NFR BLD AUTO: 0.4 %
BILIRUB SERPL-MCNC: 0.3 MG/DL (ref 0.1–1)
BUN SERPL-MCNC: 30 MG/DL (ref 8–23)
CALCIUM SERPL-MCNC: 9.6 MG/DL (ref 8.7–10.5)
CHLORIDE SERPL-SCNC: 106 MMOL/L (ref 95–110)
CO2 SERPL-SCNC: 25 MMOL/L (ref 23–29)
CREAT SERPL-MCNC: 0.9 MG/DL (ref 0.5–1.4)
DELSYS: ABNORMAL
ERYTHROCYTE [DISTWIDTH] IN BLOOD BY AUTOMATED COUNT: 17.6 % (ref 11.5–14.5)
GFR SERPLBLD CREATININE-BSD FMLA CKD-EPI: >60 ML/MIN/1.73/M2
GLUCOSE SERPL-MCNC: 111 MG/DL (ref 70–110)
HCO3 UR-SCNC: 28.1 MMOL/L (ref 24–28)
HCT VFR BLD AUTO: 39.3 % (ref 37–48.5)
HGB BLD-MCNC: 12.1 GM/DL (ref 12–16)
IMM GRANULOCYTES # BLD AUTO: 0.1 K/UL (ref 0–0.04)
IMM GRANULOCYTES NFR BLD AUTO: 0.7 % (ref 0–0.5)
INDIRECT COOMBS: NORMAL
LIPASE SERPL-CCNC: 48 U/L (ref 4–60)
LYMPHOCYTES # BLD AUTO: 2.42 K/UL (ref 1–4.8)
MCH RBC QN AUTO: 27.6 PG (ref 27–31)
MCHC RBC AUTO-ENTMCNC: 30.8 G/DL (ref 32–36)
MCV RBC AUTO: 90 FL (ref 82–98)
NUCLEATED RBC (/100WBC) (OHS): 0 /100 WBC
OHS QRS DURATION: 72 MS
OHS QTC CALCULATION: 434 MS
PCO2 BLDA: 44.7 MMHG (ref 35–45)
PH SMN: 7.41 [PH] (ref 7.35–7.45)
PLATELET # BLD AUTO: 331 K/UL (ref 150–450)
PMV BLD AUTO: 9.3 FL (ref 9.2–12.9)
PO2 BLDA: 63 MMHG (ref 80–100)
POC BE: 3 MMOL/L (ref -2–2)
POC SATURATED O2: 92 % (ref 95–100)
POTASSIUM SERPL-SCNC: 5 MMOL/L (ref 3.5–5.1)
PROT SERPL-MCNC: 8.6 GM/DL (ref 6–8.4)
RBC # BLD AUTO: 4.39 M/UL (ref 4–5.4)
RELATIVE EOSINOPHIL (OHS): 0.9 %
RELATIVE LYMPHOCYTE (OHS): 17.2 % (ref 18–48)
RELATIVE MONOCYTE (OHS): 6.4 % (ref 4–15)
RELATIVE NEUTROPHIL (OHS): 74.4 % (ref 38–73)
RH BLD: NORMAL
SAMPLE: ABNORMAL
SITE: ABNORMAL
SODIUM SERPL-SCNC: 142 MMOL/L (ref 136–145)
SPECIMEN OUTDATE: NORMAL
TROPONIN I SERPL DL<=0.01 NG/ML-MCNC: 0.07 NG/ML
WBC # BLD AUTO: 14.05 K/UL (ref 3.9–12.7)

## 2025-06-22 PROCEDURE — 86850 RBC ANTIBODY SCREEN: CPT | Performed by: EMERGENCY MEDICINE

## 2025-06-22 PROCEDURE — 96374 THER/PROPH/DIAG INJ IV PUSH: CPT | Mod: 59

## 2025-06-22 PROCEDURE — 93010 ELECTROCARDIOGRAM REPORT: CPT | Mod: ,,, | Performed by: INTERNAL MEDICINE

## 2025-06-22 PROCEDURE — 80053 COMPREHEN METABOLIC PANEL: CPT | Performed by: EMERGENCY MEDICINE

## 2025-06-22 PROCEDURE — G0378 HOSPITAL OBSERVATION PER HR: HCPCS

## 2025-06-22 PROCEDURE — 94760 N-INVAS EAR/PLS OXIMETRY 1: CPT | Mod: XB

## 2025-06-22 PROCEDURE — 99900035 HC TECH TIME PER 15 MIN (STAT)

## 2025-06-22 PROCEDURE — 25500020 PHARM REV CODE 255: Performed by: EMERGENCY MEDICINE

## 2025-06-22 PROCEDURE — 63600175 PHARM REV CODE 636 W HCPCS: Mod: JZ,TB | Performed by: EMERGENCY MEDICINE

## 2025-06-22 PROCEDURE — 25000003 PHARM REV CODE 250: Performed by: EMERGENCY MEDICINE

## 2025-06-22 PROCEDURE — 99285 EMERGENCY DEPT VISIT HI MDM: CPT | Mod: 25

## 2025-06-22 PROCEDURE — 36600 WITHDRAWAL OF ARTERIAL BLOOD: CPT

## 2025-06-22 PROCEDURE — 82803 BLOOD GASES ANY COMBINATION: CPT

## 2025-06-22 PROCEDURE — 93005 ELECTROCARDIOGRAM TRACING: CPT

## 2025-06-22 PROCEDURE — 84484 ASSAY OF TROPONIN QUANT: CPT | Performed by: EMERGENCY MEDICINE

## 2025-06-22 PROCEDURE — 85025 COMPLETE CBC W/AUTO DIFF WBC: CPT | Performed by: EMERGENCY MEDICINE

## 2025-06-22 PROCEDURE — 83690 ASSAY OF LIPASE: CPT | Performed by: EMERGENCY MEDICINE

## 2025-06-22 RX ORDER — AMOXICILLIN 250 MG
1 CAPSULE ORAL 2 TIMES DAILY PRN
Status: DISCONTINUED | OUTPATIENT
Start: 2025-06-22 | End: 2025-06-23 | Stop reason: HOSPADM

## 2025-06-22 RX ORDER — ACETAMINOPHEN 325 MG/1
650 TABLET ORAL
Status: COMPLETED | OUTPATIENT
Start: 2025-06-22 | End: 2025-06-22

## 2025-06-22 RX ORDER — NALOXONE HCL 0.4 MG/ML
0.02 VIAL (ML) INJECTION
Status: DISCONTINUED | OUTPATIENT
Start: 2025-06-22 | End: 2025-06-23 | Stop reason: HOSPADM

## 2025-06-22 RX ORDER — SODIUM CHLORIDE 0.9 % (FLUSH) 0.9 %
10 SYRINGE (ML) INJECTION EVERY 12 HOURS PRN
Status: DISCONTINUED | OUTPATIENT
Start: 2025-06-22 | End: 2025-06-23 | Stop reason: HOSPADM

## 2025-06-22 RX ORDER — ASPIRIN 325 MG
325 TABLET ORAL
Status: COMPLETED | OUTPATIENT
Start: 2025-06-22 | End: 2025-06-22

## 2025-06-22 RX ORDER — ENOXAPARIN SODIUM 100 MG/ML
40 INJECTION SUBCUTANEOUS EVERY 24 HOURS
Status: DISCONTINUED | OUTPATIENT
Start: 2025-06-23 | End: 2025-06-23 | Stop reason: HOSPADM

## 2025-06-22 RX ORDER — ONDANSETRON HYDROCHLORIDE 2 MG/ML
4 INJECTION, SOLUTION INTRAVENOUS EVERY 8 HOURS PRN
Status: DISCONTINUED | OUTPATIENT
Start: 2025-06-22 | End: 2025-06-23 | Stop reason: HOSPADM

## 2025-06-22 RX ORDER — ALUMINUM HYDROXIDE, MAGNESIUM HYDROXIDE, AND SIMETHICONE 1200; 120; 1200 MG/30ML; MG/30ML; MG/30ML
30 SUSPENSION ORAL 4 TIMES DAILY PRN
Status: DISCONTINUED | OUTPATIENT
Start: 2025-06-22 | End: 2025-06-23 | Stop reason: HOSPADM

## 2025-06-22 RX ORDER — IPRATROPIUM BROMIDE AND ALBUTEROL SULFATE 2.5; .5 MG/3ML; MG/3ML
3 SOLUTION RESPIRATORY (INHALATION) EVERY 6 HOURS PRN
Status: DISCONTINUED | OUTPATIENT
Start: 2025-06-22 | End: 2025-06-23 | Stop reason: HOSPADM

## 2025-06-22 RX ORDER — IBUPROFEN 200 MG
24 TABLET ORAL
Status: DISCONTINUED | OUTPATIENT
Start: 2025-06-22 | End: 2025-06-23 | Stop reason: HOSPADM

## 2025-06-22 RX ORDER — KETOROLAC TROMETHAMINE 30 MG/ML
15 INJECTION, SOLUTION INTRAMUSCULAR; INTRAVENOUS
Status: COMPLETED | OUTPATIENT
Start: 2025-06-22 | End: 2025-06-22

## 2025-06-22 RX ORDER — ACETAMINOPHEN 650 MG/1
650 SUPPOSITORY RECTAL EVERY 6 HOURS PRN
Status: DISCONTINUED | OUTPATIENT
Start: 2025-06-22 | End: 2025-06-23 | Stop reason: HOSPADM

## 2025-06-22 RX ORDER — GLUCAGON 1 MG
1 KIT INJECTION
Status: DISCONTINUED | OUTPATIENT
Start: 2025-06-22 | End: 2025-06-23 | Stop reason: HOSPADM

## 2025-06-22 RX ORDER — IBUPROFEN 200 MG
16 TABLET ORAL
Status: DISCONTINUED | OUTPATIENT
Start: 2025-06-22 | End: 2025-06-23 | Stop reason: HOSPADM

## 2025-06-22 RX ORDER — PROMETHAZINE HYDROCHLORIDE 25 MG/1
25 TABLET ORAL EVERY 6 HOURS PRN
Status: DISCONTINUED | OUTPATIENT
Start: 2025-06-22 | End: 2025-06-23 | Stop reason: HOSPADM

## 2025-06-22 RX ADMIN — IOHEXOL 100 ML: 350 INJECTION, SOLUTION INTRAVENOUS at 06:06

## 2025-06-22 RX ADMIN — KETOROLAC TROMETHAMINE 15 MG: 30 INJECTION, SOLUTION INTRAMUSCULAR at 08:06

## 2025-06-22 RX ADMIN — ACETAMINOPHEN 650 MG: 325 TABLET ORAL at 04:06

## 2025-06-22 RX ADMIN — ASPIRIN 325 MG: 325 TABLET ORAL at 08:06

## 2025-06-22 NOTE — ED PROVIDER NOTES
Emergency Medicine Provider Note - 6/22/2025    SCRIBE #1 NOTE: DAVID, Winnie Lopez & Luis Wall, am scribing for, and in the presence of Maryanne France DO, FACEP.        History     Chief Complaint   Patient presents with    Aspiration     Per nursing home pt was eating lunch and possibly aspirated a piece of meat. Pt is noted to have increased work of breathing with a GCS of 14 which is baseline for her.        Allergies:  Review of patient's allergies indicates:   Allergen Reactions    Amitriptyline     Hydrochlorothiazide      Causes muscle cramping    Lisinopril      hyperkalemia    Opioids - morphine analogues Hallucinations     Chest pain and hallucinations    Oxycodone     Percocet [oxycodone-acetaminophen] Other (See Comments)     Seizures    Belbuca [buprenorphine hcl] Nausea And Vomiting and Other (See Comments)     Black out     Codeine Nausea Only and Rash    Prazosin Other (See Comments)     dizziness       History of Present Illness   HPI    6/22/2025, 1:26 PM  [facility] and patient    Leslie Wood is a 84 y.o. female patient presenting to the Emergency Department with choking on chicken while eating lunch that occurred PTA. Pt has a PMHx of HTN, Alzheimer's disease, stroke, and GERD.    [facility], patient had choked on chicken.  Heimlich maneuver was performed.  After the Heimlich maneuver was performed, patient reportedly had increased work of breathing.  Sats 80%.      Patient reports chest pain.      Arrival mode: [facility]     PCP: No primary care provider on file.     Past Medical History:  Past Medical History:   Diagnosis Date    Alzheimer's disease, unspecified (CODE)     Arthritis     Cataract     Dementia without behavioral disturbance     GERD (gastroesophageal reflux disease)     Heart attack 05/23/2022    Hypertension     Stroke        Past Surgical History:  Past Surgical History:    Procedure Laterality Date    ADENOIDECTOMY      ADRENAL GLAND SURGERY      APPENDECTOMY      BACK SURGERY      fusion l 4-5 s 1,2,3  fusion l 2-3    CORONARY ANGIOGRAPHY N/A 05/20/2022    Procedure: ANGIOGRAM, CORONARY ARTERY;  Surgeon: Domingo Martins MD;  Location: [facility] CATH LAB;  Service: Cardiology;  Laterality: N/A;    CORONARY ANGIOGRAPHY N/A 05/24/2022    Procedure: ANGIOGRAM, CORONARY ARTERY;  Surgeon: Domingo Martins MD;  Location: [facility] CATH LAB;  Service: Cardiology;  Laterality: N/A;    EYE SURGERY      HEMORRHOID SURGERY      HERNIA REPAIR      HYSTERECTOMY      partial    indirect lumbar decompression      percutaneous placement of interspinous extension blocker    LEFT HEART CATHETERIZATION Left 05/20/2022    Procedure: CATHETERIZATION, HEART, LEFT;  Surgeon: Domingo Martins MD;  Location: [facility] CATH LAB;  Service: Cardiology;  Laterality: Left;    TONSILLECTOMY           Family History:  Family History   Problem Relation Name Age of Onset    Heart disease Mother      Hypertension Mother      Diabetes Mother      Hypertension Father      Kidney disease Father      Breast cancer Sister         Social History:  Social History     Tobacco Use    Smoking status: Never    Smokeless tobacco: Never   Substance and Sexual Activity    Alcohol use: No    Drug use: No    Sexual activity: Not Currently       I have reviewed the Past Medical History, Past Surgical History, Family History and Social History as documented above.      Review of Systems   Review of Systems   Constitutional:  Negative for fever.   HENT:  Negative for sore throat.         (+) choked on chicken   Respiratory:  Positive for shortness of breath (secondary to dislodging chicken from throat).    Cardiovascular:  Negative for chest pain.   Gastrointestinal:  Negative for nausea.   Genitourinary:  Negative for dysuria.   Musculoskeletal:  Negative for back pain.   Skin:  Negative for rash.   Neurological:  Negative for weakness.  "  Hematological:  Does not bruise/bleed easily.   All other systems reviewed and are negative.     Physical Exam     Initial Vitals [06/22/25 1253]   BP Pulse Resp Temp SpO2   (!) 164/78 78 (!) 22 97.8 °F (36.6 °C) 98 %      MAP       --          Physical Exam    Nursing Notes and Vital Signs Reviewed.  Constitutional: Patient is in no acute distress. Well-developed and well-nourished.  Head: Atraumatic. Normocephalic.  Eyes: PERRL. EOM intact. Conjunctivae are not pale. No scleral icterus.  ENT: No drooling. Mucous membranes are moist. Oropharynx is clear and symmetric.    Neck: Supple. Full ROM. No lymphadenopathy.  Cardiovascular: Regular rate. Regular rhythm. No murmurs, rubs, or gallops. Distal pulses are 2+ and symmetric.  Pulmonary/Chest: No respiratory distress. Breath sounds equal. No wheezing or rales.  Abdominal: Soft and non-distended.  There is no tenderness.  No rebound, guarding, or rigidity. Good bowel sounds.  Genitourinary: N/A  Musculoskeletal: Moves all extremities. No obvious deformities. No edema. No calf tenderness.  Skin: No bruising to the chest. Warm and dry.  Neurological: Pleasantly demented. Alert, awake, and appropriate.  Normal speech.  No acute focal neurological deficits are appreciated.  Psychiatric: Normal affect. Good eye contact. Appropriate in content.     ED Course   ED Procedures:  Procedures    ED Vital Signs:  Vitals:    06/22/25 1253 06/22/25 1400 06/22/25 1500 06/22/25 1530   BP: (!) 164/78  (!) 166/70 (!) 168/71   Pulse: 78 68 68 63   Resp: (!) 22   20   Temp: 97.8 °F (36.6 °C)      TempSrc: Oral      SpO2: 98%  (!) 93% (!) 93%   Height: 5' 1" (1.549 m)       06/22/25 1600 06/22/25 1630 06/22/25 1730 06/22/25 1911   BP: (!) 151/101 (!) 152/67 133/61 (!) 141/74   Pulse: 61 60 (!) 59 (!) 54   Resp: 16 16 16    Temp:       TempSrc:       SpO2: (!) 94% (!) 94% (!) 94% (!) 93%   Height:        06/22/25 1913   BP:    Pulse: (!) 55   Resp: 20   Temp:    TempSrc:    SpO2: (!) 94% "   Height:        All Lab Results:  Results for orders placed or performed during the hospital encounter of 06/22/25   ISTAT PROCEDURE    Collection Time: 06/22/25  1:53 PM   Result Value Ref Range    POC PH 7.406 7.35 - 7.45    POC PCO2 44.7 35 - 45 mmHg    POC PO2 63 (L) 80 - 100 mmHg    POC HCO3 28.1 (H) 24 - 28 mmol/L    POC BE 3 (H) -2 to 2 mmol/L    POC SATURATED O2 92 95 - 100 %    Sample ARTERIAL     Site LR     Allens Test Pass     DelSys Room Air    Comprehensive metabolic panel    Collection Time: 06/22/25  5:11 PM   Result Value Ref Range    Sodium 142 136 - 145 mmol/L    Potassium 5.0 3.5 - 5.1 mmol/L    Chloride 106 95 - 110 mmol/L    CO2 25 23 - 29 mmol/L    Glucose 111 (H) 70 - 110 mg/dL    BUN 30 (H) 8 - 23 mg/dL    Creatinine 0.9 0.5 - 1.4 mg/dL    Calcium 9.6 8.7 - 10.5 mg/dL    Protein Total 8.6 (H) 6.0 - 8.4 gm/dL    Albumin 2.9 (L) 3.5 - 5.2 g/dL    Bilirubin Total 0.3 0.1 - 1.0 mg/dL    ALP 84 40 - 150 unit/L    AST 38 11 - 45 unit/L    ALT 32 10 - 44 unit/L    Anion Gap 11 8 - 16 mmol/L    eGFR >60 >60 mL/min/1.73/m2   Lipase    Collection Time: 06/22/25  5:11 PM   Result Value Ref Range    Lipase Level 48 4 - 60 U/L   Troponin I    Collection Time: 06/22/25  5:11 PM   Result Value Ref Range    Troponin-I 0.075 (H) <=0.026 ng/mL   CBC with Differential    Collection Time: 06/22/25  5:11 PM   Result Value Ref Range    WBC 14.05 (H) 3.90 - 12.70 K/uL    RBC 4.39 4.00 - 5.40 M/uL    HGB 12.1 12.0 - 16.0 gm/dL    HCT 39.3 37.0 - 48.5 %    MCV 90 82 - 98 fL    MCH 27.6 27.0 - 31.0 pg    MCHC 30.8 (L) 32.0 - 36.0 g/dL    RDW 17.6 (H) 11.5 - 14.5 %    Platelet Count 331 150 - 450 K/uL    MPV 9.3 9.2 - 12.9 fL    Nucleated RBC 0 <=0 /100 WBC    Neut % 74.4 (H) 38 - 73 %    Lymph % 17.2 (L) 18 - 48 %    Mono % 6.4 4 - 15 %    Eos % 0.9 <=8 %    Basophil % 0.4 <=1.9 %    Imm Grans % 0.7 (H) 0.0 - 0.5 %    Neut # 10.45 (H) 1.8 - 7.7 K/uL    Lymph # 2.42 1 - 4.8 K/uL    Mono # 0.90 0.3 - 1 K/uL    Eos #  0.13 <=0.5 K/uL    Baso # 0.05 <=0.2 K/uL    Imm Grans # 0.10 (H) 0.00 - 0.04 K/uL   Type & Screen    Collection Time: 06/22/25  5:11 PM   Result Value Ref Range    Specimen Outdate 06/25/2025 23:59     Group & Rh O POS     Indirect Moraima NEG        Reviewed Prior Labs:   N/A    The EKG was ordered, reviewed, and independently interpreted by the ED provider:  ECG Results              EKG 12-lead (Preliminary result)  Result time 06/22/25 17:04:35      Wet Read by Maryanne France DO (06/22/25 17:04:35, [facility] - Emergency Dept., Emergency Medicine)    Rate of 62 beats per minute.  Normal sinus rhythm.  Normal axis.  No ST segment elevation.  No STEMI                                 The EKG was ordered, reviewed, and independently interpreted by the ED provider.  Interpretation time: 16:58  Rate: 62 BPM  Rhythm: normal sinus rhythm  Interpretation: No acute ST or T wave changes detected. No STEMI.        Imaging Results:  Imaging Results              CT Chest Abdomen Pelvis With IV Contrast (XPD) NO Oral Contrast (Final result)  Result time 06/22/25 18:26:12      Final result by Thien Reed MD (06/22/25 18:26:12)                   Impression:      1.    Acute nondisplaced or sternal manubrium fractures.  2.    Sigmoid colitis or mass with possible colovaginal fistula.  Sigmoidoscopy is recommended to exclude underlying malignancy when clinically appropriate.    Finalized on: 6/22/2025 6:26 PM By:  Thien Reed MD  [facility]# [id number]      2025-06-22 18:28:13.951     [facility]               Narrative:    EXAM:  CT CHEST ABDOMEN PELVIS WITH IV CONTRAST (XPD)    CLINICAL HISTORY:          Polytrauma, blunt;    TECHNIQUE:  Routine contrast-enhanced CT of the chest, abdomen, and pelvis was performed.  Examination performed after the intravenous administration of iodinated contrast. All CT scans at [this location] are performed using dose modulation techniques as appropriate to a performed exam including the following:  automated exposure control; adjustment of the mA and/or kV according to patient size (this includes techniques or standardized protocols for targeted exams where dose is matched to indication / reason for exam; i.e. extremities or head); use of iterative reconstruction technique. IV contrast was administered.    Comparison: Multiple prior exams, most recently 05/08/2025.    FINDINGS:    Chest:  Lungs/pleura: No acute pulmonary finding. No pleural effusion or pneumothorax.  Heart: No significant cardiomegaly or pericardial effusion. Coronary artery atherosclerotic calcification are present.  Lymph nodes: No pathologic adenopathy.  Vascular: Nonaneurysmal aorta.  Osseous: Nondisplaced sternomanubrial fractures are present.    Abdomen Pelvis:  Stable fat density lesion anterior to the left hepatic lobe.  Cholecystectomy.  No acute finding involving the liver, spleen, kidneys, pancreas, or stomach.  Ventral abdominal hernia repair with mesh.  Irregular appearance of the sigmoid colon.  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  Uterus is surgically absent..  Spinal stimulator device is present.  Postsurgical changes to the L3-L4 level.  No acute or aggressive osseous finding in the abdomen or pelvis.                                         X-Ray Chest AP Portable (Final result)  Result time 06/22/25 14:13:17      Final result by Laquita Walker MD (06/22/25 14:13:17)                   Impression:      No radiographic evidence of an acute cardiopulmonary abnormality.    Finalized on: 6/22/2025 2:13 PM By:  Laquita Walker MD  [facility]# [id number]      2025-06-22 14:15:20.792     [facility]               Narrative:    PROCEDURE:  XR CHEST AP PORTABLE    INDICATION:  Dyspnea    TECHNIQUE:  AP view of the chest.    COMPARISON:  Chest radiograph 05/08/2025    FINDINGS:  The cardiomediastinal silhouette is stable. The lungs are clear. There is no focal airspace consolidation,  "pleural effusion, or evidence of pneumothorax. The visualized osseous structures are unchanged.  Partially visualized thoracolumbar hardware.  Epidural stimulator device leads project over the mid thoracic spine midline.  Clips project over the medial left upper quadrant.                                              The Emergency Provider reviewed the vital signs and test results, which are outlined above.     ED Discussion   ED Medication(s):  Medications   aspirin tablet 325 mg (has no administration in time range)   ketorolac injection 15 mg (has no administration in time range)   acetaminophen tablet 650 mg (650 mg Oral Given 6/22/25 1642)   iohexoL (OMNIPAQUE 350) injection 100 mL (100 mLs Intravenous Given 6/22/25 1805)       ED Course as of 06/22/25 2003   Sun Jun 22, 2025   1435 DelSys: Room Air [LB]   1435 POC SATURATED O2: 92 [LB]   1435 X-Ray Chest AP Portable  Impression:     No radiographic evidence of an acute cardiopulmonary abnormality.   [LB]   1504 Patient able to drink fluids and eat a cracker. [LB]   1629 Re-evaluated patient.  Ariane at bedside.  Family.  Discussed findings of chest x-ray.  Patient did not appear to be any acute distress.  Repeat lung exam: Clear.  Abdomen soft no rebound or guarding no masses.  Will continue to monitor. [LB]   1651 Patient is reportedly complaining of "10/10 pain".  However patient is not tachycardic and does not appear to be any acute distress.  Family is requesting blood work and imaging. [LB]      ED Course User Index  [LB] Maryanne France, DO     6:00 PM: Dr. France transfers care of patient to Dr. Perez pending labs results.     7:37 PM: Discussed case with Myranda Gray NP (Hospital Medicine). Dr. Gomez agrees with current care and management of pt and accepts admission.   Admitting Service: Hospital Medicine  Admitting Physician: Dr. Gomez  Admit to: Obs/Tele    7:37 PM: Re-evaluated pt. I have discussed test results, shared treatment plan, " and the need for admission with patient/family/caretaker at bedside. Pt and/or family/caretaker express understanding at this time and agree with all information. All questions answered. Pt/caretaker/family member(s) have no further questions or concerns at this time. Pt is ready for admit.         MIPS Measures     Smoker? No     Hypertension: Patient has a known history of hypertension.     Medical Decision Making                 Medical Decision Making  DDx: aspiration, chest pain, NSTEMI    Amount and/or Complexity of Data Reviewed  Labs: ordered. Decision-making details documented in ED Course.  Radiology: ordered. Decision-making details documented in ED Course.    Risk  OTC drugs.  Prescription drug management.  Diagnosis or treatment significantly limited by social determinants of health.        Prescription Management: I performed a review of the patient's current Rx medication list as input by nursing staff.    Patient's Medications   New Prescriptions    No medications on file   Previous Medications    ASPIRIN 81 MG CHEW    Take 81 mg by mouth once daily.    ATORVASTATIN (LIPITOR) 10 MG TABLET    Take 1 tablet (10 mg total) by mouth every evening.    CYANOCOBALAMIN (VITAMIN B-12) 1000 MCG TABLET    Take 1 tablet (1,000 mcg total) by mouth once daily.    DONEPEZIL (ARICEPT) 10 MG TABLET    TAKE 1 TABLET BY MOUTH EVERY EVENING    FLUOXETINE 10 MG CAPSULE    Take 10 mg by mouth once daily.    FUROSEMIDE (LASIX) 20 MG TABLET    Take 1 tablet (20 mg total) by mouth once daily.    LOSARTAN (COZAAR) 50 MG TABLET    Take 1 tablet (50 mg total) by mouth 2 (two) times daily.    MEMANTINE (NAMENDA) 10 MG TAB    TAKE 1 TABLET BY MOUTH TWICE DAILY    METOPROLOL SUCCINATE (TOPROL-XL) 25 MG 24 HR TABLET    Take ½ tablet (12.5 mg total) by mouth once daily.    MULTIVITAMIN TAB    Take 1 tablet by mouth once daily.    OLANZAPINE (ZYPREXA) 5 MG TABLET    Take 1 tablet (5 mg total) by mouth every evening.   Modified  "Medications    No medications on file   Discontinued Medications    No medications on file        Discussed case verbally with:    Referrals:  No orders of the defined types were placed in this encounter.    Portions of this note may have been created with voice recognition software. Occasional "wrong-word" or "sound-a-like" substitutions may have occurred due to the inherent limitations of voice recognition software. Please, read the note carefully and recognize, using context, where substitutions have occurred.          Clinical Impression       ICD-10-CM ICD-9-CM   1. Elevated troponin  R79.89 790.6   2. Dyspnea  R06.00 786.09   3. Epigastric pain  R10.13 789.06   4. Closed fracture of manubrium, initial encounter  S22.21XA 807.2         ED Disposition    Disposition:   Disposition: Placed in Observation  Condition: Stable      ED Follow-up        Scribe Attestation:   Scribe #1: I, Winnie Wall, performed the above scribed service and the documentation accurately describes the services I performed. I attest to the accuracy of the note.     Attending:   Physician Attestation Statement for Scribe #1: IMaryanne DO, FACEP, personally performed the services described in this documentation, as scribed by Winnie Wall, in my presence, and it is both accurate and complete.                   Jonah Perez MD  06/22/25 2003    "

## 2025-06-23 VITALS
OXYGEN SATURATION: 94 % | HEART RATE: 56 BPM | WEIGHT: 121.69 LBS | HEIGHT: 61 IN | DIASTOLIC BLOOD PRESSURE: 72 MMHG | TEMPERATURE: 97 F | RESPIRATION RATE: 18 BRPM | SYSTOLIC BLOOD PRESSURE: 182 MMHG | BODY MASS INDEX: 22.98 KG/M2

## 2025-06-23 PROBLEM — S22.23XA: Chronic | Status: ACTIVE | Noted: 2025-06-23

## 2025-06-23 PROBLEM — G30.9 ALZHEIMER'S DEMENTIA: Chronic | Status: ACTIVE | Noted: 2025-06-23

## 2025-06-23 PROBLEM — S22.23XA: Status: ACTIVE | Noted: 2025-06-23

## 2025-06-23 PROBLEM — I69.354 HEMIPLEGIA AND HEMIPARESIS FOLLOWING CEREBRAL INFARCTION AFFECTING LEFT NON-DOMINANT SIDE: Chronic | Status: ACTIVE | Noted: 2021-03-04

## 2025-06-23 PROBLEM — I10 ESSENTIAL HYPERTENSION: Chronic | Status: ACTIVE | Noted: 2024-12-12

## 2025-06-23 PROBLEM — D72.829 LEUKOCYTOSIS: Status: ACTIVE | Noted: 2025-06-23

## 2025-06-23 PROBLEM — R93.3 ABNORMAL CT SCAN, COLON: Status: ACTIVE | Noted: 2025-06-23

## 2025-06-23 PROBLEM — G30.9 ALZHEIMER'S DEMENTIA: Status: ACTIVE | Noted: 2025-06-23

## 2025-06-23 PROBLEM — K52.9 COLITIS: Status: ACTIVE | Noted: 2025-06-23

## 2025-06-23 PROBLEM — F02.80 ALZHEIMER'S DEMENTIA: Status: ACTIVE | Noted: 2025-06-23

## 2025-06-23 PROBLEM — F02.80 ALZHEIMER'S DEMENTIA: Chronic | Status: ACTIVE | Noted: 2025-06-23

## 2025-06-23 PROBLEM — E78.49 OTHER HYPERLIPIDEMIA: Chronic | Status: ACTIVE | Noted: 2025-02-03

## 2025-06-23 PROBLEM — R79.89 ELEVATED TROPONIN: Status: ACTIVE | Noted: 2025-06-23

## 2025-06-23 LAB
ABSOLUTE EOSINOPHIL (OHS): 0.27 K/UL
ABSOLUTE MONOCYTE (OHS): 1.01 K/UL (ref 0.3–1)
ABSOLUTE NEUTROPHIL COUNT (OHS): 6.12 K/UL (ref 1.8–7.7)
ALBUMIN SERPL BCP-MCNC: 2.5 G/DL (ref 3.5–5.2)
ALP SERPL-CCNC: 73 UNIT/L (ref 40–150)
ALT SERPL W/O P-5'-P-CCNC: 27 UNIT/L (ref 10–44)
ANION GAP (OHS): 9 MMOL/L (ref 8–16)
AST SERPL-CCNC: 30 UNIT/L (ref 11–45)
BASOPHILS # BLD AUTO: 0.08 K/UL
BASOPHILS NFR BLD AUTO: 0.8 %
BILIRUB SERPL-MCNC: 0.4 MG/DL (ref 0.1–1)
BUN SERPL-MCNC: 30 MG/DL (ref 8–23)
CALCIUM SERPL-MCNC: 9.2 MG/DL (ref 8.7–10.5)
CHLORIDE SERPL-SCNC: 106 MMOL/L (ref 95–110)
CO2 SERPL-SCNC: 25 MMOL/L (ref 23–29)
CREAT SERPL-MCNC: 0.9 MG/DL (ref 0.5–1.4)
ERYTHROCYTE [DISTWIDTH] IN BLOOD BY AUTOMATED COUNT: 17.8 % (ref 11.5–14.5)
GFR SERPLBLD CREATININE-BSD FMLA CKD-EPI: >60 ML/MIN/1.73/M2
GLUCOSE SERPL-MCNC: 91 MG/DL (ref 70–110)
HCT VFR BLD AUTO: 37.1 % (ref 37–48.5)
HGB BLD-MCNC: 11.2 GM/DL (ref 12–16)
IMM GRANULOCYTES # BLD AUTO: 0.04 K/UL (ref 0–0.04)
IMM GRANULOCYTES NFR BLD AUTO: 0.4 % (ref 0–0.5)
LYMPHOCYTES # BLD AUTO: 2.42 K/UL (ref 1–4.8)
MCH RBC QN AUTO: 27.5 PG (ref 27–31)
MCHC RBC AUTO-ENTMCNC: 30.2 G/DL (ref 32–36)
MCV RBC AUTO: 91 FL (ref 82–98)
NUCLEATED RBC (/100WBC) (OHS): 0 /100 WBC
PLATELET # BLD AUTO: 287 K/UL (ref 150–450)
PMV BLD AUTO: 9.9 FL (ref 9.2–12.9)
POTASSIUM SERPL-SCNC: 4.6 MMOL/L (ref 3.5–5.1)
PROT SERPL-MCNC: 7.4 GM/DL (ref 6–8.4)
RBC # BLD AUTO: 4.08 M/UL (ref 4–5.4)
RELATIVE EOSINOPHIL (OHS): 2.7 %
RELATIVE LYMPHOCYTE (OHS): 24.3 % (ref 18–48)
RELATIVE MONOCYTE (OHS): 10.2 % (ref 4–15)
RELATIVE NEUTROPHIL (OHS): 61.6 % (ref 38–73)
SODIUM SERPL-SCNC: 140 MMOL/L (ref 136–145)
WBC # BLD AUTO: 9.94 K/UL (ref 3.9–12.7)

## 2025-06-23 PROCEDURE — 85025 COMPLETE CBC W/AUTO DIFF WBC: CPT | Performed by: HOSPITALIST

## 2025-06-23 PROCEDURE — 80053 COMPREHEN METABOLIC PANEL: CPT | Performed by: HOSPITALIST

## 2025-06-23 PROCEDURE — 99204 OFFICE O/P NEW MOD 45 MIN: CPT | Mod: ,,, | Performed by: PHYSICIAN ASSISTANT

## 2025-06-23 PROCEDURE — G0378 HOSPITAL OBSERVATION PER HR: HCPCS

## 2025-06-23 PROCEDURE — 36415 COLL VENOUS BLD VENIPUNCTURE: CPT | Performed by: HOSPITALIST

## 2025-06-23 PROCEDURE — 63600175 PHARM REV CODE 636 W HCPCS: Performed by: HOSPITALIST

## 2025-06-23 PROCEDURE — 92610 EVALUATE SWALLOWING FUNCTION: CPT

## 2025-06-23 PROCEDURE — 99900035 HC TECH TIME PER 15 MIN (STAT)

## 2025-06-23 PROCEDURE — 25000003 PHARM REV CODE 250: Performed by: STUDENT IN AN ORGANIZED HEALTH CARE EDUCATION/TRAINING PROGRAM

## 2025-06-23 PROCEDURE — 96372 THER/PROPH/DIAG INJ SC/IM: CPT | Performed by: HOSPITALIST

## 2025-06-23 RX ORDER — MEMANTINE HYDROCHLORIDE 10 MG/1
10 TABLET ORAL 2 TIMES DAILY
Status: DISCONTINUED | OUTPATIENT
Start: 2025-06-23 | End: 2025-06-23 | Stop reason: HOSPADM

## 2025-06-23 RX ORDER — NAPROXEN SODIUM 220 MG/1
81 TABLET, FILM COATED ORAL DAILY
Status: DISCONTINUED | OUTPATIENT
Start: 2025-06-23 | End: 2025-06-23 | Stop reason: HOSPADM

## 2025-06-23 RX ORDER — FLUOXETINE 10 MG/1
10 CAPSULE ORAL DAILY
Status: DISCONTINUED | OUTPATIENT
Start: 2025-06-23 | End: 2025-06-23 | Stop reason: HOSPADM

## 2025-06-23 RX ORDER — DONEPEZIL HYDROCHLORIDE 5 MG/1
10 TABLET, FILM COATED ORAL NIGHTLY
Status: DISCONTINUED | OUTPATIENT
Start: 2025-06-23 | End: 2025-06-23 | Stop reason: HOSPADM

## 2025-06-23 RX ORDER — HYDRALAZINE HYDROCHLORIDE 20 MG/ML
10 INJECTION INTRAMUSCULAR; INTRAVENOUS EVERY 6 HOURS PRN
Status: DISCONTINUED | OUTPATIENT
Start: 2025-06-23 | End: 2025-06-23 | Stop reason: HOSPADM

## 2025-06-23 RX ORDER — ATORVASTATIN CALCIUM 10 MG/1
10 TABLET, FILM COATED ORAL NIGHTLY
Status: DISCONTINUED | OUTPATIENT
Start: 2025-06-23 | End: 2025-06-23 | Stop reason: HOSPADM

## 2025-06-23 RX ORDER — OLANZAPINE 5 MG/1
5 TABLET, FILM COATED ORAL NIGHTLY
Status: DISCONTINUED | OUTPATIENT
Start: 2025-06-23 | End: 2025-06-23 | Stop reason: HOSPADM

## 2025-06-23 RX ADMIN — FLUOXETINE 10 MG: 10 CAPSULE ORAL at 09:06

## 2025-06-23 RX ADMIN — ENOXAPARIN SODIUM 40 MG: 40 INJECTION SUBCUTANEOUS at 05:06

## 2025-06-23 RX ADMIN — ASPIRIN 81 MG CHEWABLE TABLET 81 MG: 81 TABLET CHEWABLE at 09:06

## 2025-06-23 RX ADMIN — MEMANTINE 10 MG: 10 TABLET ORAL at 09:06

## 2025-06-23 NOTE — PLAN OF CARE
Discussed poc with pt and daughter. Daughter verbalized understanding    Purposeful rounding every 2hours. Turn Q2    VS wnl  Cardiac monitoring in use   Fall precautions in place, remains injury free  Pt denies c/o pain    Accurate I&Os. Put on Minced and moist diet post-swallow evaluation. Assisted w/ feeding   Bed locked at lowest position  Call light within reach    Chart check complete  Will cont with POC

## 2025-06-23 NOTE — ASSESSMENT & PLAN NOTE
Patient incidentally found to have sigmoid colitis and/or mass with possible colovaginal fistula noted on CT scan noted below.  Patient without complaints of abdominal pain, dysuria, or changes in bowel/bladder movements.  Patient remains afebrile with leukocytosis measuring 14.05 and initiated on antibiotics.    CT Chest/Abdomen/Pelvis    Impression:       1.    Acute nondisplaced or sternal manubrium fractures.  2.    Sigmoid colitis or mass with possible colovaginal fistula.  Sigmoidoscopy is recommended to exclude underlying malignancy when clinically appropriate.     Plan:  -continue antibiotics

## 2025-06-23 NOTE — ASSESSMENT & PLAN NOTE
Patient with diverticulitis vs colitis vs colon mass with possible fistula.   No family present at this time, but reviewed Dr. Stacy's consult note dated 05/10/25. At that time, family didn't desire further management. I agree that sigmoidoscopy, wouldn't likely change her management.   This was previously treated with antibiotics in May and will likely be a chronic issue. Not sure additional course of antibiotics is needed. Will defer to HM team.

## 2025-06-23 NOTE — PLAN OF CARE
[facility] - Med Surg  Discharge Final Note    Primary Care Provider: No primary care provider on file.    Expected Discharge Date: 6/23/2025    Final Discharge Note (most recent)       Final Note - 06/23/25 1528          Final Note    Assessment Type Final Discharge Note     Anticipated Discharge Disposition correction Nursing Facility     Hospital Resources/Appts/Education Provided Post-Acute resouces added to AVS        Post-Acute Status    Discharge Delays Ambulance Transport/Facility Transport                   After-discharge care                Destination       *[facility]   Service: Nursing Home    [address] [place] [address] CHRISTIANNE DONOVAN.  [facility] [id number]   Phone: [phone]                     Plan for pt to return to [facility] today.

## 2025-06-23 NOTE — SUBJECTIVE & OBJECTIVE
Past Medical History:   Diagnosis Date    Alzheimer's disease, unspecified (CODE)     Arthritis     Cataract     Dementia without behavioral disturbance     GERD (gastroesophageal reflux disease)     Heart attack 05/23/2022    Hypertension     Stroke        Past Surgical History:   Procedure Laterality Date    ADENOIDECTOMY      ADRENAL GLAND SURGERY      APPENDECTOMY      BACK SURGERY      fusion l 4-5 s 1,2,3  fusion l 2-3    CORONARY ANGIOGRAPHY N/A 05/20/2022    Procedure: ANGIOGRAM, CORONARY ARTERY;  Surgeon: Domingo Martins MD;  Location: [facility] CATH LAB;  Service: Cardiology;  Laterality: N/A;    CORONARY ANGIOGRAPHY N/A 05/24/2022    Procedure: ANGIOGRAM, CORONARY ARTERY;  Surgeon: Domingo Martins MD;  Location: [facility] CATH LAB;  Service: Cardiology;  Laterality: N/A;    EYE SURGERY      HEMORRHOID SURGERY      HERNIA REPAIR      HYSTERECTOMY      partial    indirect lumbar decompression      percutaneous placement of interspinous extension blocker    LEFT HEART CATHETERIZATION Left 05/20/2022    Procedure: CATHETERIZATION, HEART, LEFT;  Surgeon: Domingo Martins MD;  Location: [facility] CATH LAB;  Service: Cardiology;  Laterality: Left;    TONSILLECTOMY         Review of patient's allergies indicates:   Allergen Reactions    Amitriptyline     Hydrochlorothiazide      Causes muscle cramping    Lisinopril      hyperkalemia    Opioids - morphine analogues Hallucinations     Chest pain and hallucinations    Oxycodone     Percocet [oxycodone-acetaminophen] Other (See Comments)     Seizures    Belbuca [buprenorphine hcl] Nausea And Vomiting and Other (See Comments)     Black out     Codeine Nausea Only and Rash    Prazosin Other (See Comments)     dizziness       No current facility-administered medications on file prior to encounter.     Current Outpatient Medications on File Prior to Encounter   Medication Sig    aspirin 81 MG Chew Take 81 mg by mouth once daily.    atorvastatin (LIPITOR) 10 MG tablet Take 1 tablet  (10 mg total) by mouth every evening.    cyanocobalamin (VITAMIN B-12) 1000 MCG tablet Take 1 tablet (1,000 mcg total) by mouth once daily.    donepeziL (ARICEPT) 10 MG tablet TAKE 1 TABLET BY MOUTH EVERY EVENING    FLUoxetine 10 MG capsule Take 10 mg by mouth once daily.    furosemide (LASIX) 20 MG tablet Take 1 tablet (20 mg total) by mouth once daily.    losartan (COZAAR) 50 MG tablet Take 1 tablet (50 mg total) by mouth 2 (two) times daily.    memantine (NAMENDA) 10 MG Tab TAKE 1 TABLET BY MOUTH TWICE DAILY    metoprolol succinate (TOPROL-XL) 25 MG 24 hr tablet Take ½ tablet (12.5 mg total) by mouth once daily. (Patient taking differently: Take 25 mg by mouth once daily.)    multivitamin Tab Take 1 tablet by mouth once daily.    OLANZapine (ZYPREXA) 5 MG tablet Take 1 tablet (5 mg total) by mouth every evening.    [DISCONTINUED] carvediloL (COREG) 6.25 MG tablet Take 1 tablet (6.25 mg total) by mouth 2 (two) times daily. TAKE (1) TABLET BY MOUTH 2 TIMES A DAY WITH MEALS    [DISCONTINUED] cloNIDine (CATAPRES) 0.1 MG tablet Take 1 tablet (0.1 mg total) by mouth 2 (two) times daily. To take an extra dose if BP >160/90    [DISCONTINUED] diclofenac sodium (VOLTAREN) 1 % Gel Apply 2 g topically 3 (three) times daily.    [DISCONTINUED] isosorbide mononitrate (IMDUR) 30 MG 24 hr tablet TAKE 1 TABLET BY MOUTH TWICE A DAY    [DISCONTINUED] nitroGLYCERIN (NITROSTAT) 0.4 MG SL tablet Place 1 tablet (0.4 mg total) under the tongue every 5 (five) minutes as needed for Chest pain.     Family History       Problem Relation (Age of Onset)    Breast cancer Sister    Diabetes Mother    Heart disease Mother    Hypertension Mother, Father    Kidney disease Father          Tobacco Use    Smoking status: Never    Smokeless tobacco: Never   Substance and Sexual Activity    Alcohol use: No    Drug use: No    Sexual activity: Not Currently     Review of Systems   All other systems reviewed and are negative.    Objective:     Vital Signs  (Most Recent):  Temp: 97.8 °F (36.6 °C) (06/22/25 2115)  Pulse: (!) 51 (06/22/25 2125)  Resp: 18 (06/22/25 2115)  BP: 135/61 (06/22/25 2115)  SpO2: (!) 93 % (Simultaneous filing. User may not have seen previous data.) (06/22/25 2115) Vital Signs (24h Range):  Temp:  [97.8 °F (36.6 °C)] 97.8 °F (36.6 °C)  Pulse:  [51-78] 51  Resp:  [16-22] 18  SpO2:  [93 %-98 %] 93 %  BP: (133-168)/() 135/61        Body mass index is 26.91 kg/m².     Physical Exam  Vitals reviewed.   Constitutional:       General: She is not in acute distress.     Appearance: Normal appearance. She is normal weight. She is ill-appearing. She is not toxic-appearing or diaphoretic.   HENT:      Head: Normocephalic and atraumatic.      Right Ear: External ear normal.      Left Ear: External ear normal.      Nose: Nose normal. No congestion or rhinorrhea.      Mouth/Throat:      Mouth: Mucous membranes are moist.      Pharynx: Oropharynx is clear. No oropharyngeal exudate or posterior oropharyngeal erythema.   Eyes:      General: No scleral icterus.     Extraocular Movements: Extraocular movements intact.      Conjunctiva/sclera: Conjunctivae normal.      Pupils: Pupils are equal, round, and reactive to light.   Neck:      Vascular: No carotid bruit.   Cardiovascular:      Rate and Rhythm: Regular rhythm. Bradycardia present.      Pulses: Normal pulses.      Heart sounds: Normal heart sounds. No murmur heard.     No friction rub. No gallop.   Pulmonary:      Effort: Pulmonary effort is normal. No respiratory distress.      Breath sounds: Normal breath sounds. No stridor. No wheezing, rhonchi or rales.   Chest:      Chest wall: No tenderness.   Abdominal:      General: Abdomen is flat. Bowel sounds are normal. There is no distension.      Palpations: Abdomen is soft. There is no mass.      Tenderness: There is no abdominal tenderness. There is no right CVA tenderness, left CVA tenderness, guarding or rebound.      Hernia: No hernia is present.    Musculoskeletal:         General: No swelling, tenderness, deformity or signs of injury. Normal range of motion.      Cervical back: Normal range of motion and neck supple. No rigidity or tenderness.      Right lower leg: No edema.      Left lower leg: No edema.   Lymphadenopathy:      Cervical: No cervical adenopathy.   Skin:     General: Skin is warm and dry.      Capillary Refill: Capillary refill takes less than 2 seconds.      Coloration: Skin is not jaundiced or pale.      Findings: No bruising, erythema, lesion or rash.   Neurological:      Mental Status: She is alert. Mental status is at baseline. She is disoriented.      Cranial Nerves: No cranial nerve deficit.      Sensory: No sensory deficit.      Motor: No weakness.      Coordination: Coordination normal.      Comments: Patient awake and alert but disoriented in setting of known dementia.  Currently at neurological baseline per daughter at bedside.   Psychiatric:      Comments: Patient currently at baseline per daughter at bedside in setting of advanced dementia              CRANIAL NERVES     CN III, IV, VI   Pupils are equal, round, and reactive to light.       Significant Labs: All pertinent labs within the past 24 hours have been reviewed.    Significant Imaging: I have reviewed all pertinent imaging results/findings within the past 24 hours.    LABS:  Recent Results (from the past 24 hours)   ISTAT PROCEDURE    Collection Time: 06/22/25  1:53 PM   Result Value Ref Range    POC PH 7.406 7.35 - 7.45    POC PCO2 44.7 35 - 45 mmHg    POC PO2 63 (L) 80 - 100 mmHg    POC HCO3 28.1 (H) 24 - 28 mmol/L    POC BE 3 (H) -2 to 2 mmol/L    POC SATURATED O2 92 95 - 100 %    Sample ARTERIAL     Site LR     Allens Test Pass     DelSys Room Air    EKG 12-lead    Collection Time: 06/22/25  4:58 PM   Result Value Ref Range    QRS Duration 72 ms    [facility] QTC Calculation 434 ms   Type & Screen    Collection Time: 06/22/25  5:11 PM   Result Value Ref Range    Specimen  Outdate 06/25/2025 23:59     Group & Rh O POS     Indirect Moraima NEG    Comprehensive metabolic panel    Collection Time: 06/22/25  5:11 PM   Result Value Ref Range    Sodium 142 136 - 145 mmol/L    Potassium 5.0 3.5 - 5.1 mmol/L    Chloride 106 95 - 110 mmol/L    CO2 25 23 - 29 mmol/L    Glucose 111 (H) 70 - 110 mg/dL    BUN 30 (H) 8 - 23 mg/dL    Creatinine 0.9 0.5 - 1.4 mg/dL    Calcium 9.6 8.7 - 10.5 mg/dL    Protein Total 8.6 (H) 6.0 - 8.4 gm/dL    Albumin 2.9 (L) 3.5 - 5.2 g/dL    Bilirubin Total 0.3 0.1 - 1.0 mg/dL    ALP 84 40 - 150 unit/L    AST 38 11 - 45 unit/L    ALT 32 10 - 44 unit/L    Anion Gap 11 8 - 16 mmol/L    eGFR >60 >60 mL/min/1.73/m2   Lipase    Collection Time: 06/22/25  5:11 PM   Result Value Ref Range    Lipase Level 48 4 - 60 U/L   Troponin I    Collection Time: 06/22/25  5:11 PM   Result Value Ref Range    Troponin-I 0.075 (H) <=0.026 ng/mL   CBC with Differential    Collection Time: 06/22/25  5:11 PM   Result Value Ref Range    WBC 14.05 (H) 3.90 - 12.70 K/uL    RBC 4.39 4.00 - 5.40 M/uL    HGB 12.1 12.0 - 16.0 gm/dL    HCT 39.3 37.0 - 48.5 %    MCV 90 82 - 98 fL    MCH 27.6 27.0 - 31.0 pg    MCHC 30.8 (L) 32.0 - 36.0 g/dL    RDW 17.6 (H) 11.5 - 14.5 %    Platelet Count 331 150 - 450 K/uL    MPV 9.3 9.2 - 12.9 fL    Nucleated RBC 0 <=0 /100 WBC    Neut % 74.4 (H) 38 - 73 %    Lymph % 17.2 (L) 18 - 48 %    Mono % 6.4 4 - 15 %    Eos % 0.9 <=8 %    Basophil % 0.4 <=1.9 %    Imm Grans % 0.7 (H) 0.0 - 0.5 %    Neut # 10.45 (H) 1.8 - 7.7 K/uL    Lymph # 2.42 1 - 4.8 K/uL    Mono # 0.90 0.3 - 1 K/uL    Eos # 0.13 <=0.5 K/uL    Baso # 0.05 <=0.2 K/uL    Imm Grans # 0.10 (H) 0.00 - 0.04 K/uL       RADIOLOGY  CT Chest Abdomen Pelvis With IV Contrast (XPD) NO Oral Contrast  Result Date: 6/22/2025  EXAM:  CT CHEST ABDOMEN PELVIS WITH IV CONTRAST (XPD) CLINICAL HISTORY:          Polytrauma, blunt; TECHNIQUE:  Routine contrast-enhanced CT of the chest, abdomen, and pelvis was performed.  Examination  performed after the intravenous administration of iodinated contrast. All CT scans at [this location] are performed using dose modulation techniques as appropriate to a performed exam including the following: automated exposure control; adjustment of the mA and/or kV according to patient size (this includes techniques or standardized protocols for targeted exams where dose is matched to indication / reason for exam; i.e. extremities or head); use of iterative reconstruction technique. IV contrast was administered. Comparison: Multiple prior exams, most recently 05/08/2025. FINDINGS: Chest: Lungs/pleura: No acute pulmonary finding. No pleural effusion or pneumothorax. Heart: No significant cardiomegaly or pericardial effusion. Coronary artery atherosclerotic calcification are present. Lymph nodes: No pathologic adenopathy. Vascular: Nonaneurysmal aorta. Osseous: Nondisplaced sternomanubrial fractures are present. Abdomen Pelvis: Stable fat density lesion anterior to the left hepatic lobe.  Cholecystectomy.  No acute finding involving the liver, spleen, kidneys, pancreas, or stomach.  Ventral abdominal hernia repair with mesh.  Irregular appearance of the sigmoid colon.  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  Uterus is surgically absent..  Spinal stimulator device is present.  Postsurgical changes to the L3-L4 level.  No acute or aggressive osseous finding in the abdomen or pelvis.     1.    Acute nondisplaced or sternal manubrium fractures. 2.    Sigmoid colitis or mass with possible colovaginal fistula.  Sigmoidoscopy is recommended to exclude underlying malignancy when clinically appropriate. Finalized on: 6/22/2025 6:26 PM By:  Thien Reed MD [facility] [place]# [id number]      2025-06-22 18:28:[id number]     [facility] [place]    X-Ray Chest AP Portable  Result Date: 6/22/2025  PROCEDURE:  XR CHEST AP PORTABLE INDICATION:  Dyspnea TECHNIQUE: AP view of the chest.  COMPARISON: Chest radiograph 05/08/2025 FINDINGS: The cardiomediastinal silhouette is stable. The lungs are clear. There is no focal airspace consolidation, pleural effusion, or evidence of pneumothorax. The visualized osseous structures are unchanged.  Partially visualized thoracolumbar hardware.  Epidural stimulator device leads project over the mid thoracic spine midline.  Clips project over the medial left upper quadrant.     No radiographic evidence of an acute cardiopulmonary abnormality. Finalized on: 6/22/2025 2:13 PM By:  Laquita Walker MD [facility]# [id number]      2025-06-22 14:15:20.792     [facility]      EKG    MICROBIOLOGY    MDM

## 2025-06-23 NOTE — ASSESSMENT & PLAN NOTE
Patient found to have WBC measuring 14.05 in absence of fever. CT findings concerning for sigmoid colitis.  Chest x-ray negative for acute findings.  UA not obtained.  Elevation likely secondary to stress response from recent Heimlich/sternal fracture versus colitis.  Plan:  -continue to monitor   -continue antibiotics

## 2025-06-23 NOTE — ASSESSMENT & PLAN NOTE
Patient with dementia with likely etiology of vascular dementia. Dementia is severe. The patient does not have signs of behavioral disturbance. Home dementia medications are Held or Continued: continued.. Continue non-pharmacologic interventions to prevent delirium (No VS between 11PM-5AM, activity during day, opening blinds, providing glasses/hearing aids, and up in chair during daytime). Will avoid narcotics and benzos unless absolutely necessary. PRN anti-psychotics are not prescribed to avoid self harm behaviors.

## 2025-06-23 NOTE — ASSESSMENT & PLAN NOTE
Patient is chronically on statin.will continue for now. Last Lipid Panel:   Lab Results   Component Value Date    CHOL 128 04/03/2024    HDL 46 04/03/2024    LDLCALC 66.4 04/03/2024    TRIG 78 04/03/2024    CHOLHDL 35.9 04/03/2024     Plan:  -Continue home medication  -low fat/low calorie diet

## 2025-06-23 NOTE — PLAN OF CARE
Pt to d/c back to NH. Report called to nurse[facility]  IV and cardiac monitor removed. Chart check complete

## 2025-06-23 NOTE — ASSESSMENT & PLAN NOTE
Chronic, uncontrolled.  Latest blood pressure and vitals reviewed-   Temp:  [97.8 °F (36.6 °C)-98.1 °F (36.7 °C)]   Pulse:  [51-78]   Resp:  [16-22]   BP: (133-168)/()   SpO2:  [93 %-98 %] .   Home meds for hypertension were reviewed and noted below.   Hypertension Medications              furosemide (LASIX) 20 MG tablet Take 1 tablet (20 mg total) by mouth once daily.    losartan (COZAAR) 50 MG tablet Take 1 tablet (50 mg total) by mouth 2 (two) times daily.    metoprolol succinate (TOPROL-XL) 25 MG 24 hr tablet Take ½ tablet (12.5 mg total) by mouth once daily.     While in the hospital, will manage blood pressure as follows; Continue home antihypertensive regimen    Will utilize p.r.n. blood pressure medication only if patient's blood pressure greater than  180/110 and she develops symptoms such as worsening chest pain or shortness of breath.

## 2025-06-23 NOTE — HPI
The patient presented to the ER after choking on chicken while eating lunch. Heimlich maneuver was performed but patient seemed to have increased work of breathing with Sats 80%. In the ER, she had some chest pain. CXR was negative for acute process. WBC count was 14 but normal today. A CT of the chest/abd/pel was done which showed irregular appearance of the sigmoid colon. 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 The patient is not oriented to place or time. There is no family in the room. The patient currently denies abdominal pain, nausea or vomiting. She had this same abnormal CT finding when admit 05/08/25. General surgery was consulted and family declined intervention given her increased risk and lack of symptoms. Colonoscopy 12/2021 showed tortuous colon, hemorrhoids, diverticulosis and one transverse polyp.

## 2025-06-23 NOTE — PT/OT/SLP EVAL
"Speech Language Pathology Evaluation  Bedside Swallow    Patient Name:  Leslie Wood   MRN:  [id number]  Admitting Diagnosis: Dysphagia    Recommendations:                 General Recommendations:  Diet follow-up  Diet recommendations:  Minced & Moist Diet - IDDSI Level 5, Thin liquids - IDDSI Level 0   Aspiration Precautions: 1 bite/sip at a time, Assistance with meals, Avoid talking while eating, Check for pocketing/oral residue, Eliminate distractions, Feed only when awake/alert, Frequent oral care, HOB to 90 degrees, Meds whole buried in puree, Meds whole 1 at a time, Small bites/sips, and Standard aspiration precautions   General Precautions: Standard, aspiration  Communication strategies:  provide increased time to answer  Discharge recommendations:   (pending acute progress)   Barriers to Discharge:  None    Assessment:     Leslie Wood is a 85 y.o. female with a PMHx significant for Alzheimer's disease, GERD w hx of esophageal, and hx of CVA who presented s/t choking episode at the NH with piece of chicken that resulted in the Heimlich maneuver. Pt poor historian and daughter at bedside to provide hx. Daughter reports pt was "eating alone in her room" and was eating a regular texture diet with a whole piece of chicken given at the time of event. Pt appreciated to be at cognitive-linguistic baseline; pleasant throughout. OM appreciated w generalized weakness. No overt s/s of aspiration appreciated throughout CSE. ST recommends pt for a diet of minced and moist solids (IDDSI 5) and thin liquids (IDDSI 0) with medications taken whole one at a time or in puree as needed. Pt also will benefit from mealtime assistance for feeding and is recommended to consume all meals in dining room or with assistance at NH. ST provided education regarding cognitive impacts on swallow safety/efficiency specifically ensuring pt is awake/alert, eliminating distractions, and mealtime assistance. Daughter verbalized understanding " and agreement. Given hx of GERD and multiple esophageal dilations w choking episode, could benefit from follow-up with OP GI on d/c. ST to follow-up diet tolerance.     History:     Past Medical History:   Diagnosis Date    Alzheimer's disease, unspecified (CODE)     Arthritis     Cataract     Dementia without behavioral disturbance     GERD (gastroesophageal reflux disease)     Heart attack 05/23/2022    Hypertension     Stroke        Past Surgical History:   Procedure Laterality Date    ADENOIDECTOMY      ADRENAL GLAND SURGERY      APPENDECTOMY      BACK SURGERY      fusion l 4-5 s 1,2,3  fusion l 2-3    CORONARY ANGIOGRAPHY N/A 05/20/2022    Procedure: ANGIOGRAM, CORONARY ARTERY;  Surgeon: Domingo Martins MD;  Location: [facility] CATH LAB;  Service: Cardiology;  Laterality: N/A;    CORONARY ANGIOGRAPHY N/A 05/24/2022    Procedure: ANGIOGRAM, CORONARY ARTERY;  Surgeon: Domingo Martins MD;  Location: [facility] CATH LAB;  Service: Cardiology;  Laterality: N/A;    EYE SURGERY      HEMORRHOID SURGERY      HERNIA REPAIR      HYSTERECTOMY      partial    indirect lumbar decompression      percutaneous placement of interspinous extension blocker    LEFT HEART CATHETERIZATION Left 05/20/2022    Procedure: CATHETERIZATION, HEART, LEFT;  Surgeon: Domingo Martins MD;  Location: [facility] CATH LAB;  Service: Cardiology;  Laterality: Left;    TONSILLECTOMY         Social History: Patient lives at the [facility].    Prior Intubation HX:  NA this admission     Modified Barium Swallow: NA per chart review    PROCEDURE:  XR CHEST AP PORTABLE 06/23/2025:     FINDINGS:  The cardiomediastinal silhouette is stable. The lungs are clear. There is no focal airspace consolidation, pleural effusion, or evidence of pneumothorax. The visualized osseous structures are unchanged.  Partially visualized thoracolumbar hardware.  Epidural stimulator device leads project over the mid thoracic spine midline.  Clips project over the medial left upper  quadrant.     Impression:  No radiographic evidence of an acute cardiopulmonary abnormality.     Finalized on: 6/22/2025 2:13 PM By:  Laquita Walker MD  [facility]# [id number]      2025-06-22 14:15:20.792     [facility]    EXAM:  CT CHEST ABDOMEN PELVIS WITH IV CONTRAST (XPD) on 06/23/2025:     FINDINGS:  Chest:  Lungs/pleura: No acute pulmonary finding. No pleural effusion or pneumothorax.  Heart: No significant cardiomegaly or pericardial effusion. Coronary artery atherosclerotic calcification are present.  Lymph nodes: No pathologic adenopathy.  Vascular: Nonaneurysmal aorta.  Osseous: Nondisplaced sternomanubrial fractures are present.     Abdomen Pelvis:  Stable fat density lesion anterior to the left hepatic lobe.  Cholecystectomy.  No acute finding involving the liver, spleen, kidneys, pancreas, or stomach.  Ventral abdominal hernia repair with mesh.  Irregular appearance of the sigmoid colon.  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  Uterus is surgically absent..  Spinal stimulator device is present.  Postsurgical changes to the L3-L4 level.  No acute or aggressive osseous finding in the abdomen or pelvis.     Impression:  1.    Acute nondisplaced or sternal manubrium fractures.  2.    Sigmoid colitis or mass with possible colovaginal fistula.  Sigmoidoscopy is recommended to exclude underlying malignancy when clinically appropriate.     Finalized on: 6/22/2025 6:26 PM By:  Thien Reed MD  [facility]# [id number]      2025-06-22 18:28:13.951     [facility]    Prior diet: Regular per daughter.    Subjective     Pt seen bedside for ST velazquez w daughter present throughout  Patient goals: unable to state     Pain/Comfort:  Pain Rating 1: 0/10  Pain Rating Post-Intervention 1: 0/10  Pain Rating 2: 0/10  Pain Rating Post-Intervention 2: 0/10    Respiratory Status: Room air    Objective:     Oral Musculature Evaluation  Oral Musculature: general weakness  Dentition:  present and adequate  Secretion Management: adequate  Mucosal Quality: good  Mandibular Strength and Mobility: impaired  Oral Labial Strength and Mobility: functional seal  Lingual Strength and Mobility: impaired strength  Velar Elevation: WFL  Buccal Strength and Mobility: WFL  Volitional Cough: present  Volitional Swallow: apprecaited  Voice Prior to PO Intake: clear    Bedside Swallow Eval:     Clinical Swallow Examination:   Of note, patient fed by ST/hand over hand w ST throughout evaluation. Patient presented with:     CONSISTENCY  NOTES   THIN (IDDSI 0) Cup/straw sips   No overt s/s of aspiration appreciated. Clear vocal quality.    PUREE (IDDSI 4/Extremely Thick)   TSP/TBSP bites of pudding/applesauce No overt s/s of aspiration. No significant oral residue.   SOLID (IDDSI 7/Regular) Bite of elicia cracker cookie    No overt s/s of aspiration appreciated. Prolonged mastication w trace oral residue.        Thickened liquids were not used in this assessment. Tito (2018) reported that thickened liquids have no sound evidence at reducing the risk of pneumonia in patients with dysphagia and can cause harm by increasing their risk of dehydration. It also presents an increased risk of UTI, electrolyte imbalance, constipation, fecal impaction, cognitive impairment, functional decline and even death ([place], 2002; [place], 2016).  Thickened liquids are associated with risks including dehydration, increased pharyngeal residue, potential interference with medication absorption, and decreased quality of life (Jennifer, 2013). Thickened liquids are also more likely to be silently aspirated than thin liquids (Luis et al., 2018). This supports the assertion that we should confirm a patient requires thickened liquids with an instrumental swallow study prior to recommending them.    References:   Jennifer NAVARRO (2013). Thickening agents used for dysphagia management: Effect on bioavailability of water, medication and  feelings of satiety. Nutrition Journal, 12, 54. https://doi.org/10.1186/5531-3882-13-54    MELANIE Smith, JYOTSNA Pedraza, BRISSA Vo, & MELANIE Shook (2018). Cough response to aspiration in thin and thick fluids during FEES in hospitalized inpatients. International journal of language & communication disorders, 53(5), 909-918. https://doi.org/10.1111/8711-0492.32257    INTERPRETATION AND RISK ASSESSMENT:  Clinical swallow evaluation (CSE) revealed oral phase characterized by lingual, labial, and buccal strength and range of motion adequate for lip closure, bolus preparation and propulsion. The patient had no anterior loss of the bolus with complete closure of the lips around the utensils. Trace residue remained in the oral cavity following the swallow. Patient without overt clinical signs/symptoms of aspiration on any PO trials given.Patient presents with a possibly inefficient swallow as indicated by generalized weakness of OM. Contributing risk factors for dysphagia include cognitive deficits. Patient with increased risk for silent aspiration given potential sensory deficits related to hx of stroke.    Goals:   Multidisciplinary Problems       SLP Goals          Problem: SLP    Goal Priority Disciplines Outcome   SLP Goal     SLP    Description: TPW safely tolerate least restrictive PO diet.                        Plan:     Patient to be seen:  2 x/week, 3 x/week   Plan of Care expires:     Plan of Care reviewed with:  patient, daughter, caregiver   SLP Follow-Up:  Yes       Time Tracking:     SLP Treatment Date:   06/23/25  Speech Start Time:  0920  Speech Stop Time:  0946     Speech Total Time (min):  26 min    Billable Minutes: Eval Swallow and Oral Function 26    Candis Dewayne Cohen MA, L-SLP, CCC-SLP  Speech Language Pathologist  06/23/2025

## 2025-06-23 NOTE — ASSESSMENT & PLAN NOTE
Patient found to have troponin elevated at 0.075 in absence of reported chest pain or EKG findings of acute ischemia noted.  Likely elevated secondary to presumed Heimlich maneuver.  Plan:  -telemetry  -trend troponin  -serial EKGs at onset/worsening chest pain  -MELI therapy p.r.n.

## 2025-06-23 NOTE — ASSESSMENT & PLAN NOTE
Patient presented following episode of dysphagia/choking and became hypoxic while eating lunch. Patient status post Heimlich maneuver with improvement in symptoms. Initial workup in the ED revealed patient to be afebrile with leukocytosis measuring 14.05, hypertensive, blood glucose 111, troponin 0.075, EKG negative for signs of acute ischemia, chest x-ray negative for acute findings, CT chest/abdomen/pelvis positive for sigmoid colitis or mass and possible colovaginal fistula.  Patient initiated on antibiotics and admitted to Hospital Medicine under observation and awaiting further evaluation by speech pathology.  Plan:  -NPO  -Telemetry   -Bowel regimen  -Bedrest  -IVFs prn  -Antiemetics prn  -Tylenol as needed for fever   -Continue antibiotics   -PT/OT/SLP

## 2025-06-23 NOTE — HPI
Leslie Wood is a 84 y.o. female with a PMH  has a past medical history of Alzheimer's disease, unspecified (CODE), Arthritis, Cataract, Dementia without behavioral disturbance, GERD (gastroesophageal reflux disease), Heart attack (05/23/2022), Hypertension, and Stroke. who presented to the ED via EMS from nursing home after patient was noticed to be choking on chicken while eating lunch earlier today.  History was obtained through chart review, ED sign-out, and daughter at bedside as patient unable to provide history given advanced dementia.  Daughter reported patient began having difficulty breathing and was turning blue during which time nursing home staff perform the Heimlich maneuver which improved her symptoms.  Given patient's increased work of breathing and desaturation, patient was transferred to the ED for further evaluation given concerns of possible aspiration.  Patient initially complained of chest pain in his currently without any concerns or complaints at time of bedside assessment.  Daughter at bedside confirmed DNR code status and was in his usual state of health prior to incident.  All other review of systems negative except as noted above.  Initial workup in the ED revealed patient to be afebrile with leukocytosis measuring 14.05, hypertensive, blood glucose 111, troponin 0.075, EKG negative for signs of acute ischemia, chest x-ray negative for acute findings, CT chest/abdomen/pelvis positive for sigmoid colitis or mass and possible colovaginal fistula.  Patient initiated on antibiotics and admitted to Hospital Medicine under observation for continued medical management.    PCP: No primary care provider on file.

## 2025-06-23 NOTE — ASSESSMENT & PLAN NOTE
Likely secondary to Heimlich maneuver.  Patient currently denies endorsing any chest pain.  Plan:  -optimize pain control  -continued conservative management

## 2025-06-23 NOTE — CONSULTS
[facility] Surg  Gastroenterology  Consult Note    Patient Name: Leslie Wood  MRN: [id number]  Admission Date: 6/22/2025  Hospital Length of Stay: 0 days  Code Status: DNR   Attending Provider: Nikhil Power MD   Consulting Provider: Martir Noel PA-C  Primary Care Physician: No primary care provider on file.  Principal Problem:Dysphagia    Inpatient consult to Gastroenterology  Consult performed by: Martir Noel PA-C  Consult ordered by: Nikhil Power MD  Reason for consult: Abnormal CT scan      Subjective:     HPI:  The patient presented to the ER after choking on chicken while eating lunch. Heimlich maneuver was performed but patient seemed to have increased work of breathing with Sats 80%. In the ER, she had some chest pain. CXR was negative for acute process. WBC count was 14 but normal today. A CT of the chest/abd/pel was done which showed irregular appearance of the sigmoid colon. 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 The patient is not oriented to place or time. There is no family in the room. The patient currently denies abdominal pain, nausea or vomiting. She had this same abnormal CT finding when admit 05/08/25. General surgery was consulted and family declined intervention given her increased risk and lack of symptoms. Colonoscopy 12/2021 showed tortuous colon, hemorrhoids, diverticulosis and one transverse polyp.     Past Medical History:   Diagnosis Date    Alzheimer's disease, unspecified (CODE)     Arthritis     Cataract     Dementia without behavioral disturbance     GERD (gastroesophageal reflux disease)     Heart attack 05/23/2022    Hypertension     Stroke        Past Surgical History:   Procedure Laterality Date    ADENOIDECTOMY      ADRENAL GLAND SURGERY      APPENDECTOMY      BACK SURGERY      fusion l 4-5 s 1,2,3  fusion l 2-3    CORONARY ANGIOGRAPHY N/A 05/20/2022    Procedure:  ANGIOGRAM, CORONARY ARTERY;  Surgeon: Domingo Martins MD;  Location: [facility] CATH LAB;  Service: Cardiology;  Laterality: N/A;    CORONARY ANGIOGRAPHY N/A 05/24/2022    Procedure: ANGIOGRAM, CORONARY ARTERY;  Surgeon: Domingo Martins MD;  Location: [facility] CATH LAB;  Service: Cardiology;  Laterality: N/A;    EYE SURGERY      HEMORRHOID SURGERY      HERNIA REPAIR      HYSTERECTOMY      partial    indirect lumbar decompression      percutaneous placement of interspinous extension blocker    LEFT HEART CATHETERIZATION Left 05/20/2022    Procedure: CATHETERIZATION, HEART, LEFT;  Surgeon: Domingo Martins MD;  Location: [facility] CATH LAB;  Service: Cardiology;  Laterality: Left;    TONSILLECTOMY         Review of patient's allergies indicates:   Allergen Reactions    Amitriptyline     Hydrochlorothiazide      Causes muscle cramping    Lisinopril      hyperkalemia    Opioids - morphine analogues Hallucinations     Chest pain and hallucinations    Oxycodone     Percocet [oxycodone-acetaminophen] Other (See Comments)     Seizures    Belbuca [buprenorphine hcl] Nausea And Vomiting and Other (See Comments)     Black out     Codeine Nausea Only and Rash    Prazosin Other (See Comments)     dizziness     Family History       Problem Relation (Age of Onset)    Breast cancer Sister    Diabetes Mother    Heart disease Mother    Hypertension Mother, Father    Kidney disease Father          Tobacco Use    Smoking status: Never    Smokeless tobacco: Never   Substance and Sexual Activity    Alcohol use: No    Drug use: No    Sexual activity: Not Currently     Review of Systems   Constitutional:  Negative for fever.   Respiratory:  Negative for shortness of breath.    Cardiovascular:  Negative for chest pain.   Gastrointestinal:  Negative for abdominal pain.     Objective:     Vital Signs (Most Recent):  Temp: 96.5 °F (35.8 °C) (06/23/25 0834)  Pulse: (!) 44 (06/23/25 0850)  Resp: 15 (06/23/25 0834)  BP: (!) 121/59  "(06/23/25 0834)  SpO2: (!) 94 % (06/23/25 0834) Vital Signs (24h Range):  Temp:  [96.5 °F (35.8 °C)-98.1 °F (36.7 °C)] 96.5 °F (35.8 °C)  Pulse:  [44-78] 44  Resp:  [15-22] 15  SpO2:  [93 %-98 %] 94 %  BP: (114-168)/() 121/59     Weight: 55.2 kg (121 lb 11.1 oz) (06/22/25 2125)  Body mass index is 22.99 kg/m².    No intake or output data in the 24 hours ending 06/23/25 1111    Lines/Drains/Airways       Peripheral Intravenous Line  Duration             Peripheral IV 06/22/25 1712 20 G Right Antecubital <1 day                     Physical Exam  Constitutional:       General: She is not in acute distress.     Appearance: Normal appearance.   HENT:      Head: Normocephalic and atraumatic.   Eyes:      Extraocular Movements: Extraocular movements intact.   Cardiovascular:      Rate and Rhythm: Normal rate and regular rhythm.      Heart sounds: No murmur heard.  Pulmonary:      Effort: Pulmonary effort is normal. No respiratory distress.      Breath sounds: Normal breath sounds. No wheezing.   Abdominal:      General: Bowel sounds are normal. There is no distension.      Palpations: Abdomen is soft. There is no mass.      Tenderness: There is no abdominal tenderness.   Skin:     General: Skin is warm and dry.   Neurological:      Mental Status: She is alert.      Cranial Nerves: No cranial nerve deficit.      Comments: Oriented to person but not time or place.    Psychiatric:         Behavior: Behavior normal.          Significant Labs:  CBC:   Recent Labs   Lab 06/22/25  1711 06/23/25  0530   WBC 14.05* 9.94   HGB 12.1 11.2*   HCT 39.3 37.1    287     CMP:   Recent Labs   Lab 06/23/25  0530   GLU 91   CALCIUM 9.2   ALBUMIN 2.5*   PROT 7.4      K 4.6   CO2 25      BUN 30*   CREATININE 0.9   ALKPHOS 73   ALT 27   AST 30   BILITOT 0.4     Coagulation: No results for input(s): "PT", "INR", "APTT" in the last 48 hours.    Significant Imaging:  Imaging results within the past 24 hours have been " reviewed.  Assessment/Plan:     GI  Abnormal CT scan, colon  Patient with diverticulitis vs colitis vs colon mass with possible fistula.   No family present at this time, but reviewed Dr. Stacy's consult note dated 05/10/25. At that time, family didn't desire further management. I agree that sigmoidoscopy, wouldn't likely change her management.   This was previously treated with antibiotics in May and will likely be a chronic issue. Not sure additional course of antibiotics is needed. Will defer to HM team.     Thank you for your consult. I will follow-up with patient. Please contact us if you have any additional questions.    Martir Noel PA-C  Gastroenterology  [facility]

## 2025-06-23 NOTE — PLAN OF CARE
[facility]  Discharge Assessment    Primary Care Provider: No primary care provider on file.     Discharge Assessment (most recent)       BRIEF DISCHARGE ASSESSMENT - 06/23/25 1507          Discharge Planning    Assessment Type Discharge Planning Brief Assessment     Resource/Environmental Concerns none     Support Systems Children     Assistance Needed Modified Independent     Equipment Currently Used at Home walker, rolling     Current Living Arrangements residential facility     Care Facility Name [facility]     Patient/Family Anticipates Transition to long-term care facility     Patient/Family Anticipated Services at Transition none     DME Needed Upon Discharge  none     Discharge Plan A Return to nursing home                 Patient is a resident at [facility]. Plan to return upon d/c.     Patient has no d/c needs at this time. Sw to follow up, as needed, for d/c planning purposes.

## 2025-06-23 NOTE — SUBJECTIVE & OBJECTIVE
Past Medical History:   Diagnosis Date    Alzheimer's disease, unspecified (CODE)     Arthritis     Cataract     Dementia without behavioral disturbance     GERD (gastroesophageal reflux disease)     Heart attack 05/23/2022    Hypertension     Stroke        Past Surgical History:   Procedure Laterality Date    ADENOIDECTOMY      ADRENAL GLAND SURGERY      APPENDECTOMY      BACK SURGERY      fusion l 4-5 s 1,2,3  fusion l 2-3    CORONARY ANGIOGRAPHY N/A 05/20/2022    Procedure: ANGIOGRAM, CORONARY ARTERY;  Surgeon: Domingo Martins MD;  Location: [facility] CATH LAB;  Service: Cardiology;  Laterality: N/A;    CORONARY ANGIOGRAPHY N/A 05/24/2022    Procedure: ANGIOGRAM, CORONARY ARTERY;  Surgeon: Domingo Martins MD;  Location: [facility] CATH LAB;  Service: Cardiology;  Laterality: N/A;    EYE SURGERY      HEMORRHOID SURGERY      HERNIA REPAIR      HYSTERECTOMY      partial    indirect lumbar decompression      percutaneous placement of interspinous extension blocker    LEFT HEART CATHETERIZATION Left 05/20/2022    Procedure: CATHETERIZATION, HEART, LEFT;  Surgeon: Domingo Martins MD;  Location: [facility] CATH LAB;  Service: Cardiology;  Laterality: Left;    TONSILLECTOMY         Review of patient's allergies indicates:   Allergen Reactions    Amitriptyline     Hydrochlorothiazide      Causes muscle cramping    Lisinopril      hyperkalemia    Opioids - morphine analogues Hallucinations     Chest pain and hallucinations    Oxycodone     Percocet [oxycodone-acetaminophen] Other (See Comments)     Seizures    Belbuca [buprenorphine hcl] Nausea And Vomiting and Other (See Comments)     Black out     Codeine Nausea Only and Rash    Prazosin Other (See Comments)     dizziness     Family History       Problem Relation (Age of Onset)    Breast cancer Sister    Diabetes Mother    Heart disease Mother    Hypertension Mother, Father    Kidney disease Father          Tobacco Use    Smoking status: Never    Smokeless tobacco: Never    Substance and Sexual Activity    Alcohol use: No    Drug use: No    Sexual activity: Not Currently     Review of Systems   Constitutional:  Negative for fever.   Respiratory:  Negative for shortness of breath.    Cardiovascular:  Negative for chest pain.   Gastrointestinal:  Negative for abdominal pain.     Objective:     Vital Signs (Most Recent):  Temp: 96.5 °F (35.8 °C) (06/23/25 0834)  Pulse: (!) 44 (06/23/25 0850)  Resp: 15 (06/23/25 0834)  BP: (!) 121/59 (06/23/25 0834)  SpO2: (!) 94 % (06/23/25 0834) Vital Signs (24h Range):  Temp:  [96.5 °F (35.8 °C)-98.1 °F (36.7 °C)] 96.5 °F (35.8 °C)  Pulse:  [44-78] 44  Resp:  [15-22] 15  SpO2:  [93 %-98 %] 94 %  BP: (114-168)/() 121/59     Weight: 55.2 kg (121 lb 11.1 oz) (06/22/25 2125)  Body mass index is 22.99 kg/m².    No intake or output data in the 24 hours ending 06/23/25 1111    Lines/Drains/Airways       Peripheral Intravenous Line  Duration             Peripheral IV 06/22/25 1712 20 G Right Antecubital <1 day                     Physical Exam  Constitutional:       General: She is not in acute distress.     Appearance: Normal appearance.   HENT:      Head: Normocephalic and atraumatic.   Eyes:      Extraocular Movements: Extraocular movements intact.   Cardiovascular:      Rate and Rhythm: Normal rate and regular rhythm.      Heart sounds: No murmur heard.  Pulmonary:      Effort: Pulmonary effort is normal. No respiratory distress.      Breath sounds: Normal breath sounds. No wheezing.   Abdominal:      General: Bowel sounds are normal. There is no distension.      Palpations: Abdomen is soft. There is no mass.      Tenderness: There is no abdominal tenderness.   Skin:     General: Skin is warm and dry.   Neurological:      Mental Status: She is alert.      Cranial Nerves: No cranial nerve deficit.      Comments: Oriented to person but not time or place.    Psychiatric:         Behavior: Behavior normal.          Significant Labs:  CBC:   Recent Labs  "  Lab 06/22/25  1711 06/23/25  0530   WBC 14.05* 9.94   HGB 12.1 11.2*   HCT 39.3 37.1    287     CMP:   Recent Labs   Lab 06/23/25  0530   GLU 91   CALCIUM 9.2   ALBUMIN 2.5*   PROT 7.4      K 4.6   CO2 25      BUN 30*   CREATININE 0.9   ALKPHOS 73   ALT 27   AST 30   BILITOT 0.4     Coagulation: No results for input(s): "PT", "INR", "APTT" in the last 48 hours.    Significant Imaging:  Imaging results within the past 24 hours have been reviewed.  "

## 2025-06-23 NOTE — H&P
[facility] Medicine  History & Physical    Patient Name: Leslie Wood  MRN: [id number]  Patient Class: OP- Observation  Admission Date: 6/22/2025  Attending Physician: Jose Enrique Gomez MD   Primary Care Provider: No primary care provider on file.         Patient information was obtained from patient, past medical records, and ER records.     Subjective:     Principal Problem:Dysphagia    Chief Complaint:   Chief Complaint   Patient presents with    Aspiration     Per nursing home pt was eating lunch and possibly aspirated a piece of meat. Pt is noted to have increased work of breathing with a GCS of 14 which is baseline for her.         HPI: Leslie Wood is a 84 y.o. female with a PMH  has a past medical history of Alzheimer's disease, unspecified (CODE), Arthritis, Cataract, Dementia without behavioral disturbance, GERD (gastroesophageal reflux disease), Heart attack (05/23/2022), Hypertension, and Stroke. who presented to the ED via EMS from nursing home after patient was noticed to be choking on chicken while eating lunch earlier today.  History was obtained through chart review, ED sign-out, and daughter at bedside as patient unable to provide history given advanced dementia.  Daughter reported patient began having difficulty breathing and was turning blue during which time nursing home staff perform the Heimlich maneuver which improved her symptoms.  Given patient's increased work of breathing and desaturation, patient was transferred to the ED for further evaluation given concerns of possible aspiration.  Patient initially complained of chest pain in his currently without any concerns or complaints at time of bedside assessment.  Daughter at bedside confirmed DNR code status and was in his usual state of health prior to incident.  All other review of systems negative except as noted above.  Initial workup in the ED revealed patient to be afebrile with leukocytosis measuring 14.05,  hypertensive, blood glucose 111, troponin 0.075, EKG negative for signs of acute ischemia, chest x-ray negative for acute findings, CT chest/abdomen/pelvis positive for sigmoid colitis or mass and possible colovaginal fistula.  Patient initiated on antibiotics and admitted to Hospital Medicine under observation for continued medical management.    PCP: No primary care provider on file.      Past Medical History:   Diagnosis Date    Alzheimer's disease, unspecified (CODE)     Arthritis     Cataract     Dementia without behavioral disturbance     GERD (gastroesophageal reflux disease)     Heart attack 05/23/2022    Hypertension     Stroke        Past Surgical History:   Procedure Laterality Date    ADENOIDECTOMY      ADRENAL GLAND SURGERY      APPENDECTOMY      BACK SURGERY      fusion l 4-5 s 1,2,3  fusion l 2-3    CORONARY ANGIOGRAPHY N/A 05/20/2022    Procedure: ANGIOGRAM, CORONARY ARTERY;  Surgeon: Domingo Martins MD;  Location: [facility] CATH LAB;  Service: Cardiology;  Laterality: N/A;    CORONARY ANGIOGRAPHY N/A 05/24/2022    Procedure: ANGIOGRAM, CORONARY ARTERY;  Surgeon: Domingo Martins MD;  Location: [facility] CATH LAB;  Service: Cardiology;  Laterality: N/A;    EYE SURGERY      HEMORRHOID SURGERY      HERNIA REPAIR      HYSTERECTOMY      partial    indirect lumbar decompression      percutaneous placement of interspinous extension blocker    LEFT HEART CATHETERIZATION Left 05/20/2022    Procedure: CATHETERIZATION, HEART, LEFT;  Surgeon: Domingo Martins MD;  Location: [facility] CATH LAB;  Service: Cardiology;  Laterality: Left;    TONSILLECTOMY         Review of patient's allergies indicates:   Allergen Reactions    Amitriptyline     Hydrochlorothiazide      Causes muscle cramping    Lisinopril      hyperkalemia    Opioids - morphine analogues Hallucinations     Chest pain and hallucinations    Oxycodone     Percocet [oxycodone-acetaminophen] Other (See Comments)     Seizures    Belbuca [buprenorphine hcl] Nausea  And Vomiting and Other (See Comments)     Black out     Codeine Nausea Only and Rash    Prazosin Other (See Comments)     dizziness       No current facility-administered medications on file prior to encounter.     Current Outpatient Medications on File Prior to Encounter   Medication Sig    aspirin 81 MG Chew Take 81 mg by mouth once daily.    atorvastatin (LIPITOR) 10 MG tablet Take 1 tablet (10 mg total) by mouth every evening.    cyanocobalamin (VITAMIN B-12) 1000 MCG tablet Take 1 tablet (1,000 mcg total) by mouth once daily.    donepeziL (ARICEPT) 10 MG tablet TAKE 1 TABLET BY MOUTH EVERY EVENING    FLUoxetine 10 MG capsule Take 10 mg by mouth once daily.    furosemide (LASIX) 20 MG tablet Take 1 tablet (20 mg total) by mouth once daily.    losartan (COZAAR) 50 MG tablet Take 1 tablet (50 mg total) by mouth 2 (two) times daily.    memantine (NAMENDA) 10 MG Tab TAKE 1 TABLET BY MOUTH TWICE DAILY    metoprolol succinate (TOPROL-XL) 25 MG 24 hr tablet Take ½ tablet (12.5 mg total) by mouth once daily. (Patient taking differently: Take 25 mg by mouth once daily.)    multivitamin Tab Take 1 tablet by mouth once daily.    OLANZapine (ZYPREXA) 5 MG tablet Take 1 tablet (5 mg total) by mouth every evening.    [DISCONTINUED] carvediloL (COREG) 6.25 MG tablet Take 1 tablet (6.25 mg total) by mouth 2 (two) times daily. TAKE (1) TABLET BY MOUTH 2 TIMES A DAY WITH MEALS    [DISCONTINUED] cloNIDine (CATAPRES) 0.1 MG tablet Take 1 tablet (0.1 mg total) by mouth 2 (two) times daily. To take an extra dose if BP >160/90    [DISCONTINUED] diclofenac sodium (VOLTAREN) 1 % Gel Apply 2 g topically 3 (three) times daily.    [DISCONTINUED] isosorbide mononitrate (IMDUR) 30 MG 24 hr tablet TAKE 1 TABLET BY MOUTH TWICE A DAY    [DISCONTINUED] nitroGLYCERIN (NITROSTAT) 0.4 MG SL tablet Place 1 tablet (0.4 mg total) under the tongue every 5 (five) minutes as needed for Chest pain.     Family History       Problem Relation (Age of Onset)     Breast cancer Sister    Diabetes Mother    Heart disease Mother    Hypertension Mother, Father    Kidney disease Father          Tobacco Use    Smoking status: Never    Smokeless tobacco: Never   Substance and Sexual Activity    Alcohol use: No    Drug use: No    Sexual activity: Not Currently     Review of Systems   All other systems reviewed and are negative.    Objective:     Vital Signs (Most Recent):  Temp: 97.8 °F (36.6 °C) (06/22/25 2115)  Pulse: (!) 51 (06/22/25 2125)  Resp: 18 (06/22/25 2115)  BP: 135/61 (06/22/25 2115)  SpO2: (!) 93 % (Simultaneous filing. User may not have seen previous data.) (06/22/25 2115) Vital Signs (24h Range):  Temp:  [97.8 °F (36.6 °C)] 97.8 °F (36.6 °C)  Pulse:  [51-78] 51  Resp:  [16-22] 18  SpO2:  [93 %-98 %] 93 %  BP: (133-168)/() 135/61        Body mass index is 26.91 kg/m².     Physical Exam  Vitals reviewed.   Constitutional:       General: She is not in acute distress.     Appearance: Normal appearance. She is normal weight. She is ill-appearing. She is not toxic-appearing or diaphoretic.   HENT:      Head: Normocephalic and atraumatic.      Right Ear: External ear normal.      Left Ear: External ear normal.      Nose: Nose normal. No congestion or rhinorrhea.      Mouth/Throat:      Mouth: Mucous membranes are moist.      Pharynx: Oropharynx is clear. No oropharyngeal exudate or posterior oropharyngeal erythema.   Eyes:      General: No scleral icterus.     Extraocular Movements: Extraocular movements intact.      Conjunctiva/sclera: Conjunctivae normal.      Pupils: Pupils are equal, round, and reactive to light.   Neck:      Vascular: No carotid bruit.   Cardiovascular:      Rate and Rhythm: Regular rhythm. Bradycardia present.      Pulses: Normal pulses.      Heart sounds: Normal heart sounds. No murmur heard.     No friction rub. No gallop.   Pulmonary:      Effort: Pulmonary effort is normal. No respiratory distress.      Breath sounds: Normal breath sounds.  No stridor. No wheezing, rhonchi or rales.   Chest:      Chest wall: No tenderness.   Abdominal:      General: Abdomen is flat. Bowel sounds are normal. There is no distension.      Palpations: Abdomen is soft. There is no mass.      Tenderness: There is no abdominal tenderness. There is no right CVA tenderness, left CVA tenderness, guarding or rebound.      Hernia: No hernia is present.   Musculoskeletal:         General: No swelling, tenderness, deformity or signs of injury. Normal range of motion.      Cervical back: Normal range of motion and neck supple. No rigidity or tenderness.      Right lower leg: No edema.      Left lower leg: No edema.   Lymphadenopathy:      Cervical: No cervical adenopathy.   Skin:     General: Skin is warm and dry.      Capillary Refill: Capillary refill takes less than 2 seconds.      Coloration: Skin is not jaundiced or pale.      Findings: No bruising, erythema, lesion or rash.   Neurological:      Mental Status: She is alert. Mental status is at baseline. She is disoriented.      Cranial Nerves: No cranial nerve deficit.      Sensory: No sensory deficit.      Motor: No weakness.      Coordination: Coordination normal.      Comments: Patient awake and alert but disoriented in setting of known dementia.  Currently at neurological baseline per daughter at bedside.   Psychiatric:      Comments: Patient currently at baseline per daughter at bedside in setting of advanced dementia              CRANIAL NERVES     CN III, IV, VI   Pupils are equal, round, and reactive to light.       Significant Labs: All pertinent labs within the past 24 hours have been reviewed.    Significant Imaging: I have reviewed all pertinent imaging results/findings within the past 24 hours.    LABS:  Recent Results (from the past 24 hours)   ISTAT PROCEDURE    Collection Time: 06/22/25  1:53 PM   Result Value Ref Range    POC PH 7.406 7.35 - 7.45    POC PCO2 44.7 35 - 45 mmHg    POC PO2 63 (L) 80 - 100 mmHg     POC HCO3 28.1 (H) 24 - 28 mmol/L    POC BE 3 (H) -2 to 2 mmol/L    POC SATURATED O2 92 95 - 100 %    Sample ARTERIAL     Site LR     Allens Test Pass     DelSys Room Air    EKG 12-lead    Collection Time: 06/22/25  4:58 PM   Result Value Ref Range    QRS Duration 72 ms    [facility] QTC Calculation 434 ms   Type & Screen    Collection Time: 06/22/25  5:11 PM   Result Value Ref Range    Specimen Outdate 06/25/2025 23:59     Group & Rh O POS     Indirect Moraima NEG    Comprehensive metabolic panel    Collection Time: 06/22/25  5:11 PM   Result Value Ref Range    Sodium 142 136 - 145 mmol/L    Potassium 5.0 3.5 - 5.1 mmol/L    Chloride 106 95 - 110 mmol/L    CO2 25 23 - 29 mmol/L    Glucose 111 (H) 70 - 110 mg/dL    BUN 30 (H) 8 - 23 mg/dL    Creatinine 0.9 0.5 - 1.4 mg/dL    Calcium 9.6 8.7 - 10.5 mg/dL    Protein Total 8.6 (H) 6.0 - 8.4 gm/dL    Albumin 2.9 (L) 3.5 - 5.2 g/dL    Bilirubin Total 0.3 0.1 - 1.0 mg/dL    ALP 84 40 - 150 unit/L    AST 38 11 - 45 unit/L    ALT 32 10 - 44 unit/L    Anion Gap 11 8 - 16 mmol/L    eGFR >60 >60 mL/min/1.73/m2   Lipase    Collection Time: 06/22/25  5:11 PM   Result Value Ref Range    Lipase Level 48 4 - 60 U/L   Troponin I    Collection Time: 06/22/25  5:11 PM   Result Value Ref Range    Troponin-I 0.075 (H) <=0.026 ng/mL   CBC with Differential    Collection Time: 06/22/25  5:11 PM   Result Value Ref Range    WBC 14.05 (H) 3.90 - 12.70 K/uL    RBC 4.39 4.00 - 5.40 M/uL    HGB 12.1 12.0 - 16.0 gm/dL    HCT 39.3 37.0 - 48.5 %    MCV 90 82 - 98 fL    MCH 27.6 27.0 - 31.0 pg    MCHC 30.8 (L) 32.0 - 36.0 g/dL    RDW 17.6 (H) 11.5 - 14.5 %    Platelet Count 331 150 - 450 K/uL    MPV 9.3 9.2 - 12.9 fL    Nucleated RBC 0 <=0 /100 WBC    Neut % 74.4 (H) 38 - 73 %    Lymph % 17.2 (L) 18 - 48 %    Mono % 6.4 4 - 15 %    Eos % 0.9 <=8 %    Basophil % 0.4 <=1.9 %    Imm Grans % 0.7 (H) 0.0 - 0.5 %    Neut # 10.45 (H) 1.8 - 7.7 K/uL    Lymph # 2.42 1 - 4.8 K/uL    Mono # 0.90 0.3 - 1 K/uL    Eos #  0.13 <=0.5 K/uL    Baso # 0.05 <=0.2 K/uL    Imm Grans # 0.10 (H) 0.00 - 0.04 K/uL       RADIOLOGY  CT Chest Abdomen Pelvis With IV Contrast (XPD) NO Oral Contrast  Result Date: 6/22/2025  EXAM:  CT CHEST ABDOMEN PELVIS WITH IV CONTRAST (XPD) CLINICAL HISTORY:          Polytrauma, blunt; TECHNIQUE:  Routine contrast-enhanced CT of the chest, abdomen, and pelvis was performed.  Examination performed after the intravenous administration of iodinated contrast. All CT scans at [this location] are performed using dose modulation techniques as appropriate to a performed exam including the following: automated exposure control; adjustment of the mA and/or kV according to patient size (this includes techniques or standardized protocols for targeted exams where dose is matched to indication / reason for exam; i.e. extremities or head); use of iterative reconstruction technique. IV contrast was administered. Comparison: Multiple prior exams, most recently 05/08/2025. FINDINGS: Chest: Lungs/pleura: No acute pulmonary finding. No pleural effusion or pneumothorax. Heart: No significant cardiomegaly or pericardial effusion. Coronary artery atherosclerotic calcification are present. Lymph nodes: No pathologic adenopathy. Vascular: Nonaneurysmal aorta. Osseous: Nondisplaced sternomanubrial fractures are present. Abdomen Pelvis: Stable fat density lesion anterior to the left hepatic lobe.  Cholecystectomy.  No acute finding involving the liver, spleen, kidneys, pancreas, or stomach.  Ventral abdominal hernia repair with mesh.  Irregular appearance of the sigmoid colon.  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  Uterus is surgically absent..  Spinal stimulator device is present.  Postsurgical changes to the L3-L4 level.  No acute or aggressive osseous finding in the abdomen or pelvis.     1.    Acute nondisplaced or sternal manubrium fractures. 2.    Sigmoid  colitis or mass with possible colovaginal fistula.  Sigmoidoscopy is recommended to exclude underlying malignancy when clinically appropriate. Finalized on: 6/22/2025 6:26 PM By:  Thien Reed MD [facility]# [id number]      2025-06-22 18:28:[id number]     [facility]    X-Ray Chest AP Portable  Result Date: 6/22/2025  PROCEDURE:  XR CHEST AP PORTABLE INDICATION:  Dyspnea TECHNIQUE: AP view of the chest. COMPARISON: Chest radiograph 05/08/2025 FINDINGS: The cardiomediastinal silhouette is stable. The lungs are clear. There is no focal airspace consolidation, pleural effusion, or evidence of pneumothorax. The visualized osseous structures are unchanged.  Partially visualized thoracolumbar hardware.  Epidural stimulator device leads project over the mid thoracic spine midline.  Clips project over the medial left upper quadrant.     No radiographic evidence of an acute cardiopulmonary abnormality. Finalized on: 6/22/2025 2:13 PM By:  Laquita Walker MD [facility]# [id number]      2025-06-22 14:15:20.792     [facility]      EKG    MICROBIOLOGY    MDM    Assessment/Plan:     Assessment & Plan  Dysphagia  Patient presented following episode of dysphagia/choking and became hypoxic while eating lunch. Patient status post Heimlich maneuver with improvement in symptoms. Initial workup in the ED revealed patient to be afebrile with leukocytosis measuring 14.05, hypertensive, blood glucose 111, troponin 0.075, EKG negative for signs of acute ischemia, chest x-ray negative for acute findings, CT chest/abdomen/pelvis positive for sigmoid colitis or mass and possible colovaginal fistula.  Patient initiated on antibiotics and admitted to Hospital Medicine under observation and awaiting further evaluation by speech pathology.  Plan:  -NPO  -Telemetry   -Bowel regimen  -Bedrest  -IVFs prn  -Antiemetics prn  -Tylenol as needed for fever   -Continue antibiotics   -PT/OT/SLP     Elevated troponin  Patient found to have troponin elevated at 0.075 in absence of reported  chest pain or EKG findings of acute ischemia noted.  Likely elevated secondary to presumed Heimlich maneuver.  Plan:  -telemetry  -trend troponin  -serial EKGs at onset/worsening chest pain  -MELI therapy p.r.n.    Colitis  Patient incidentally found to have sigmoid colitis and/or mass with possible colovaginal fistula noted on CT scan noted below.  Patient without complaints of abdominal pain, dysuria, or changes in bowel/bladder movements.  Patient remains afebrile with leukocytosis measuring 14.05 and initiated on antibiotics.    CT Chest/Abdomen/Pelvis    Impression:       1.    Acute nondisplaced or sternal manubrium fractures.  2.    Sigmoid colitis or mass with possible colovaginal fistula.  Sigmoidoscopy is recommended to exclude underlying malignancy when clinically appropriate.     Plan:  -continue antibiotics     Leukocytosis  Patient found to have WBC measuring 14.05 in absence of fever. CT findings concerning for sigmoid colitis.  Chest x-ray negative for acute findings.  UA not obtained.  Elevation likely secondary to stress response from recent Heimlich/sternal fracture versus colitis.  Plan:  -continue to monitor   -continue antibiotics     Essential hypertension  Chronic, uncontrolled.  Latest blood pressure and vitals reviewed-   Temp:  [97.8 °F (36.6 °C)-98.1 °F (36.7 °C)]   Pulse:  [51-78]   Resp:  [16-22]   BP: (133-168)/()   SpO2:  [93 %-98 %] .   Home meds for hypertension were reviewed and noted below.   Hypertension Medications              furosemide (LASIX) 20 MG tablet Take 1 tablet (20 mg total) by mouth once daily.    losartan (COZAAR) 50 MG tablet Take 1 tablet (50 mg total) by mouth 2 (two) times daily.    metoprolol succinate (TOPROL-XL) 25 MG 24 hr tablet Take ½ tablet (12.5 mg total) by mouth once daily.     While in the hospital, will manage blood pressure as follows; Continue home antihypertensive regimen    Will utilize p.r.n. blood pressure medication only if patient's  blood pressure greater than  180/110 and she develops symptoms such as worsening chest pain or shortness of breath.    Sternal manubrial dissociation, initial encounter for closed fracture  Likely secondary to Heimlich maneuver.  Patient currently denies endorsing any chest pain.  Plan:  -optimize pain control  -continued conservative management    Anxiety and depression  Chronic. Stable. Not in acute exacerbation and currently denies endorsing any suicidal/homicidal ideations.   Plan:  -Continue home medications     Other hyperlipidemia  Patient is chronically on statin.will continue for now. Last Lipid Panel:   Lab Results   Component Value Date    CHOL 128 04/03/2024    HDL 46 04/03/2024    LDLCALC 66.4 04/03/2024    TRIG 78 04/03/2024    CHOLHDL 35.9 04/03/2024     Plan:  -Continue home medication  -low fat/low calorie diet    Hemiplegia and hemiparesis following cerebral infarction affecting left non-dominant side  Currently at neurological baseline.  Plan:  -continue supportive care     Alzheimer's dementia  Patient with dementia with likely etiology of vascular dementia. Dementia is severe. The patient does not have signs of behavioral disturbance. Home dementia medications are Held or Continued: continued.. Continue non-pharmacologic interventions to prevent delirium (No VS between 11PM-5AM, activity during day, opening blinds, providing glasses/hearing aids, and up in chair during daytime). Will avoid narcotics and benzos unless absolutely necessary. PRN anti-psychotics are not prescribed to avoid self harm behaviors.      VTE Risk Mitigation (From admission, onward)           Ordered     enoxaparin injection 40 mg  Daily         06/22/25 2108     IP VTE HIGH RISK PATIENT  Once         06/22/25 2108     Place sequential compression device  Until discontinued         06/22/25 2108                  //Core Measures   -DVT proph: SCDs, Lovenox   -Code status: DNR    -Surrogate: daughter       Components of this  note were documented using a voice recognition system and are subject to errors not corrected at the time the document was proof read. Please contact the author for any clarifications.       On 06/22/2025, patient should be placed in hospital observation services under my care.       Jose Enrique Gomez MD  Department of Hospital Medicine  [facility] - Med Surg

## 2025-06-23 NOTE — PLAN OF CARE
Discussed plan of care with patient and this patient was able to, verbalized understanding.  Patient remains free from injury.  Safety and comfort precautions maintained this shift.   Call light and personal belongings within reach, bed in low position with bed wheels locked.  No s/s of acute distress.   Purposeful rounding continued this shift.  Pain levels are controlled per MD order. IVF infusing.  Cardiac monitoring in place.  Diet orders continued..  Vital signs continued per order.  Q2 repositioning per staff  Chart and orders review completed.   Patient education about care completed.     Problem: Adult Inpatient Plan of Care  Goal: Plan of Care Review  Outcome: Progressing  Goal: Patient-Specific Goal (Individualized)  Outcome: Progressing  Goal: Absence of Hospital-Acquired Illness or Injury  Outcome: Progressing  Goal: Optimal Comfort and Wellbeing  Outcome: Progressing  Goal: Readiness for Transition of Care  Outcome: Progressing     Problem: Wound  Goal: Optimal Coping  Outcome: Progressing  Goal: Optimal Functional Ability  Outcome: Progressing  Goal: Absence of Infection Signs and Symptoms  Outcome: Progressing  Goal: Improved Oral Intake  Outcome: Progressing  Goal: Optimal Pain Control and Function  Outcome: Progressing  Goal: Skin Health and Integrity  Outcome: Progressing  Goal: Optimal Wound Healing  Outcome: Progressing     Problem: Fall Injury Risk  Goal: Absence of Fall and Fall-Related Injury  Outcome: Progressing

## 2025-07-03 ENCOUNTER — HOSPITAL ENCOUNTER (EMERGENCY)
Facility: HOSPITAL | Age: 85
Discharge: HOME | End: 2025-07-03
Attending: EMERGENCY MEDICINE
Payer: MEDICARE

## 2025-07-03 VITALS
BODY MASS INDEX: 21.92 KG/M2 | HEART RATE: 66 BPM | SYSTOLIC BLOOD PRESSURE: 144 MMHG | RESPIRATION RATE: 16 BRPM | WEIGHT: 116 LBS | DIASTOLIC BLOOD PRESSURE: 64 MMHG | OXYGEN SATURATION: 96 %

## 2025-07-03 DIAGNOSIS — R09.02 HYPOXIA: Primary | ICD-10-CM

## 2025-07-03 DIAGNOSIS — R06.02 SOB (SHORTNESS OF BREATH): ICD-10-CM

## 2025-07-03 LAB
ABSOLUTE EOSINOPHIL (OHS): 0.07 K/UL
ABSOLUTE MONOCYTE (OHS): 0.4 K/UL (ref 0.3–1)
ABSOLUTE NEUTROPHIL COUNT (OHS): 7.03 K/UL (ref 1.8–7.7)
ALBUMIN SERPL BCP-MCNC: 3 G/DL (ref 3.5–5.2)
ALLENS TEST: ABNORMAL
ALP SERPL-CCNC: 98 UNIT/L (ref 40–150)
ALT SERPL W/O P-5'-P-CCNC: 23 UNIT/L (ref 10–44)
ANION GAP (OHS): 11 MMOL/L (ref 8–16)
AST SERPL-CCNC: 24 UNIT/L (ref 11–45)
BASOPHILS # BLD AUTO: 0.03 K/UL
BASOPHILS NFR BLD AUTO: 0.3 %
BILIRUB SERPL-MCNC: 0.3 MG/DL (ref 0.1–1)
BILIRUB UR QL STRIP.AUTO: NEGATIVE
BNP SERPL-MCNC: 197 PG/ML (ref 0–99)
BUN SERPL-MCNC: 86 MG/DL (ref 8–23)
CALCIUM SERPL-MCNC: 10.1 MG/DL (ref 8.7–10.5)
CHLORIDE SERPL-SCNC: 105 MMOL/L (ref 95–110)
CLARITY UR: CLEAR
CO2 SERPL-SCNC: 26 MMOL/L (ref 23–29)
COLOR UR AUTO: YELLOW
CREAT SERPL-MCNC: 1.1 MG/DL (ref 0.5–1.4)
D DIMER PPP IA.FEU-MCNC: 1.67 MG/L FEU
DELSYS: ABNORMAL
ERYTHROCYTE [DISTWIDTH] IN BLOOD BY AUTOMATED COUNT: 18.3 % (ref 11.5–14.5)
GFR SERPLBLD CREATININE-BSD FMLA CKD-EPI: 49 ML/MIN/1.73/M2
GLUCOSE SERPL-MCNC: 199 MG/DL (ref 70–110)
GLUCOSE UR QL STRIP: NEGATIVE
HCO3 UR-SCNC: 28.7 MMOL/L (ref 24–28)
HCT VFR BLD AUTO: 41.2 % (ref 37–48.5)
HGB BLD-MCNC: 12.4 GM/DL (ref 12–16)
HGB UR QL STRIP: NEGATIVE
IMM GRANULOCYTES # BLD AUTO: 0.05 K/UL (ref 0–0.04)
IMM GRANULOCYTES NFR BLD AUTO: 0.5 % (ref 0–0.5)
INFLUENZA A MOLECULAR (OHS): NEGATIVE
INFLUENZA B MOLECULAR (OHS): NEGATIVE
KETONES UR QL STRIP: NEGATIVE
LEUKOCYTE ESTERASE UR QL STRIP: NEGATIVE
LYMPHOCYTES # BLD AUTO: 1.69 K/UL (ref 1–4.8)
MCH RBC QN AUTO: 27.7 PG (ref 27–31)
MCHC RBC AUTO-ENTMCNC: 30.1 G/DL (ref 32–36)
MCV RBC AUTO: 92 FL (ref 82–98)
NITRITE UR QL STRIP: NEGATIVE
NUCLEATED RBC (/100WBC) (OHS): 0 /100 WBC
PCO2 BLDA: 46.6 MMHG (ref 35–45)
PH SMN: 7.4 [PH] (ref 7.35–7.45)
PH UR STRIP: 6 [PH]
PLATELET # BLD AUTO: 305 K/UL (ref 150–450)
PMV BLD AUTO: 9.8 FL (ref 9.2–12.9)
PO2 BLDA: 62 MMHG (ref 80–100)
POC BE: 4 MMOL/L (ref -2–2)
POC SATURATED O2: 91 % (ref 95–100)
POTASSIUM SERPL-SCNC: 4.6 MMOL/L (ref 3.5–5.1)
PROT SERPL-MCNC: 8.5 GM/DL (ref 6–8.4)
PROT UR QL STRIP: NEGATIVE
RBC # BLD AUTO: 4.48 M/UL (ref 4–5.4)
RELATIVE EOSINOPHIL (OHS): 0.8 %
RELATIVE LYMPHOCYTE (OHS): 18.2 % (ref 18–48)
RELATIVE MONOCYTE (OHS): 4.3 % (ref 4–15)
RELATIVE NEUTROPHIL (OHS): 75.9 % (ref 38–73)
SAMPLE: ABNORMAL
SARS-COV-2 RDRP RESP QL NAA+PROBE: NEGATIVE
SITE: ABNORMAL
SODIUM SERPL-SCNC: 142 MMOL/L (ref 136–145)
SP GR UR STRIP: 1.01
TROPONIN I SERPL DL<=0.01 NG/ML-MCNC: 0.02 NG/ML
UROBILINOGEN UR STRIP-ACNC: NEGATIVE EU/DL
WBC # BLD AUTO: 9.27 K/UL (ref 3.9–12.7)

## 2025-07-03 PROCEDURE — 87502 INFLUENZA DNA AMP PROBE: CPT | Performed by: EMERGENCY MEDICINE

## 2025-07-03 PROCEDURE — U0002 COVID-19 LAB TEST NON-CDC: HCPCS | Performed by: EMERGENCY MEDICINE

## 2025-07-03 PROCEDURE — 36600 WITHDRAWAL OF ARTERIAL BLOOD: CPT

## 2025-07-03 PROCEDURE — 99900035 HC TECH TIME PER 15 MIN (STAT)

## 2025-07-03 PROCEDURE — 93010 ELECTROCARDIOGRAM REPORT: CPT | Mod: ,,, | Performed by: INTERNAL MEDICINE

## 2025-07-03 PROCEDURE — 84484 ASSAY OF TROPONIN QUANT: CPT | Performed by: EMERGENCY MEDICINE

## 2025-07-03 PROCEDURE — 25500020 PHARM REV CODE 255: Performed by: EMERGENCY MEDICINE

## 2025-07-03 PROCEDURE — 85025 COMPLETE CBC W/AUTO DIFF WBC: CPT | Performed by: EMERGENCY MEDICINE

## 2025-07-03 PROCEDURE — 93005 ELECTROCARDIOGRAM TRACING: CPT

## 2025-07-03 PROCEDURE — 94760 N-INVAS EAR/PLS OXIMETRY 1: CPT

## 2025-07-03 PROCEDURE — 99285 EMERGENCY DEPT VISIT HI MDM: CPT | Mod: 25

## 2025-07-03 PROCEDURE — 83880 ASSAY OF NATRIURETIC PEPTIDE: CPT | Performed by: EMERGENCY MEDICINE

## 2025-07-03 PROCEDURE — 85379 FIBRIN DEGRADATION QUANT: CPT | Performed by: EMERGENCY MEDICINE

## 2025-07-03 PROCEDURE — 80053 COMPREHEN METABOLIC PANEL: CPT | Performed by: EMERGENCY MEDICINE

## 2025-07-03 PROCEDURE — 81003 URINALYSIS AUTO W/O SCOPE: CPT | Performed by: EMERGENCY MEDICINE

## 2025-07-03 RX ADMIN — IOHEXOL 100 ML: 350 INJECTION, SOLUTION INTRAVENOUS at 07:07

## 2025-07-03 NOTE — ED PROVIDER NOTES
SCRIBE #1 NOTE: I, Gia Fam, am scribing for, and in the presence of, Romulo Harris MD. I have scribed the entire note.       History     Chief Complaint   Patient presents with    Shortness of Breath     Pt brought in by CEDRICKI from the [facility] for SOB. Pt received a duoneb tx and 125mg of solumedrol pta. Hx of CHF     Review of patient's allergies indicates:   Allergen Reactions    Amitriptyline     Hydrochlorothiazide      Causes muscle cramping    Lisinopril      hyperkalemia    Opioids - morphine analogues Hallucinations     Chest pain and hallucinations    Oxycodone     Percocet [oxycodone-acetaminophen] Other (See Comments)     Seizures    Belbuca [buprenorphine hcl] Nausea And Vomiting and Other (See Comments)     Black out     Codeine Nausea Only and Rash    Prazosin Other (See Comments)     dizziness         History of Present Illness     HPI    7/3/2025, 3:58 PM  History obtained from the EMS and patient      History of Present Illness: Leslie Wood is a 85 y.o. female patient with a PMHx of CHF, HTN, MI, and stroke who presents to the Emergency Department for evaluation of SOB which began earlier today. Per EMS, patient states it was hard for her to breathe. She was given 125 mg of solumedrol, 1 duoneb and 1 albuterol en route. Patient states she feels relief after given the treatments. Symptoms are constant and moderate in severity. No mitigating or exacerbating factors reported. No associated sxs included. Patient denies any fever, cough, chills or congestion. No further complaints or concerns at this time.       Arrival mode: Ambulance Service    PCP: No primary care provider on file.        Past Medical History:  Past Medical History:   Diagnosis Date    Alzheimer's disease, unspecified (CODE)     Arthritis     Cataract     Dementia without behavioral disturbance     GERD (gastroesophageal reflux disease)     Heart attack 05/23/2022    Hypertension     Stroke        Past Surgical  History:  Past Surgical History:   Procedure Laterality Date    ADENOIDECTOMY      ADRENAL GLAND SURGERY      APPENDECTOMY      BACK SURGERY      fusion l 4-5 s 1,2,3  fusion l 2-3    CORONARY ANGIOGRAPHY N/A 05/20/2022    Procedure: ANGIOGRAM, CORONARY ARTERY;  Surgeon: Domingo Martins MD;  Location: [facility] CATH LAB;  Service: Cardiology;  Laterality: N/A;    CORONARY ANGIOGRAPHY N/A 05/24/2022    Procedure: ANGIOGRAM, CORONARY ARTERY;  Surgeon: Domingo Martins MD;  Location: [facility] CATH LAB;  Service: Cardiology;  Laterality: N/A;    EYE SURGERY      HEMORRHOID SURGERY      HERNIA REPAIR      HYSTERECTOMY      partial    indirect lumbar decompression      percutaneous placement of interspinous extension blocker    LEFT HEART CATHETERIZATION Left 05/20/2022    Procedure: CATHETERIZATION, HEART, LEFT;  Surgeon: Domingo Martins MD;  Location: [facility] CATH LAB;  Service: Cardiology;  Laterality: Left;    TONSILLECTOMY           Family History:  Family History   Problem Relation Name Age of Onset    Heart disease Mother      Hypertension Mother      Diabetes Mother      Hypertension Father      Kidney disease Father      Breast cancer Sister         Social History:  Social History     Tobacco Use    Smoking status: Never    Smokeless tobacco: Never   Substance and Sexual Activity    Alcohol use: No    Drug use: No    Sexual activity: Not Currently        Review of Systems     Review of Systems   Constitutional:  Negative for chills and fever.   HENT:  Negative for congestion and sore throat.    Respiratory:  Positive for shortness of breath. Negative for cough.    Cardiovascular:  Negative for chest pain.   Gastrointestinal:  Negative for nausea.   Genitourinary:  Negative for dysuria.   Musculoskeletal:  Negative for back pain.   Skin:  Negative for rash.   Neurological:  Negative for weakness.   Hematological:  Does not bruise/bleed easily.   All other systems reviewed and are negative.     Physical Exam     Initial  Vitals [07/03/25 1539]   BP Pulse Resp Temp SpO2   (!) 124/93 76 (!) 22 -- 97 %      MAP       --          Physical Exam  Nursing Notes and Vital Signs Reviewed.  Constitutional: Patient is in no acute distress. Pleasantly demented.  Head: Atraumatic. Normocephalic.  Eyes: PERRL. EOM intact. Conjunctivae are not pale. No scleral icterus.  ENT: Mucous membranes are moist. Oropharynx is clear and symmetric.    Neck: Supple. Full ROM. No lymphadenopathy.  Cardiovascular: Regular rate. Regular rhythm. No murmurs, rubs, or gallops. Distal pulses are 2+ and symmetric.  Pulmonary/Chest: No respiratory distress. Clear to auscultation bilaterally. No wheezing or rales.  Abdominal: Soft and non-distended.  There is no tenderness.  No rebound, guarding, or rigidity. Good bowel sounds.  Genitourinary: No CVA tenderness.  Musculoskeletal: Moves all extremities. No obvious deformities. No edema. No calf tenderness.  Skin: Warm and dry.  Neurological:  Alert, awake. No acute focal neurological deficits are appreciated.  Psychiatric: Normal affect. Good eye contact. Appropriate in content.     ED Course   Procedures  ED Vital Signs:  Vitals:    07/03/25 1539 07/03/25 1615 07/03/25 1645 07/03/25 1803   BP: (!) 124/93 (!) 125/58 (!) 121/57 (!) 129/57   Pulse: 76 75 79 67   Resp: (!) 22 19 18 18   SpO2: 97% 97% 95% 96%   Weight:  52.6 kg (116 lb)         Abnormal Lab Results:  Labs Reviewed   COMPREHENSIVE METABOLIC PANEL - Abnormal       Result Value    Sodium 142      Potassium 4.6      Chloride 105      CO2 26      Glucose 199 (*)     BUN 86 (*)     Creatinine 1.1      Calcium 10.1      Protein Total 8.5 (*)     Albumin 3.0 (*)     Bilirubin Total 0.3      ALP 98      AST 24      ALT 23      Anion Gap 11      eGFR 49 (*)    B-TYPE NATRIURETIC PEPTIDE - Abnormal     (*)    D DIMER, QUANTITATIVE - Abnormal    D-Dimer 1.67 (*)    CBC WITH DIFFERENTIAL - Abnormal    WBC 9.27      RBC 4.48      HGB 12.4      HCT 41.2      MCV 92       MCH 27.7      MCHC 30.1 (*)     RDW 18.3 (*)     Platelet Count 305      MPV 9.8      Nucleated RBC 0      Neut % 75.9 (*)     Lymph % 18.2      Mono % 4.3      Eos % 0.8      Basophil % 0.3      Imm Grans % 0.5      Neut # 7.03      Lymph # 1.69      Mono # 0.40      Eos # 0.07      Baso # 0.03      Imm Grans # 0.05 (*)    ISTAT PROCEDURE - Abnormal    POC PH 7.397      POC PCO2 46.6 (*)     POC PO2 62 (*)     POC HCO3 28.7 (*)     POC BE 4 (*)     POC SATURATED O2 91      Sample ARTERIAL      Site RR      Allens Test Pass      DelSys Room Air     INFLUENZA A & B BY MOLECULAR - Normal    INFLUENZA A MOLECULAR Negative      INFLUENZA B MOLECULAR  Negative     TROPONIN I - Normal    Troponin-I 0.017     SARS-COV-2 RNA AMPLIFICATION, QUAL - Normal    SARS COV-2 Molecular Negative     URINALYSIS, REFLEX TO URINE CULTURE - Normal    Color, UA Yellow      Appearance, UA Clear      pH, UA 6.0      Spec Grav UA 1.010      Protein, UA Negative      Glucose, UA Negative      Ketones, UA Negative      Bilirubin, UA Negative      Blood, UA Negative      Nitrites, UA Negative      Urobilinogen, UA Negative      Leukocyte Esterase, UA Negative     CBC W/ AUTO DIFFERENTIAL    Narrative:     The following orders were created for panel order CBC auto differential.  Procedure                               Abnormality         Status                     ---------                               -----------         ------                     CBC with Differential[[id number]]       Abnormal            Final result                 Please view results for these tests on the individual orders.   [facility] URINE HOLD        All Lab Results:  Results for orders placed or performed during the hospital encounter of 07/03/25   Influenza A & B by Molecular    Collection Time: 07/03/25  4:24 PM    Specimen: Nasal Swab   Result Value Ref Range    INFLUENZA A MOLECULAR Negative Negative    INFLUENZA B MOLECULAR  Negative Negative   COVID-19 Rapid  Screening    Collection Time: 07/03/25  4:24 PM   Result Value Ref Range    SARS COV-2 Molecular Negative Negative   ISTAT PROCEDURE    Collection Time: 07/03/25  4:44 PM   Result Value Ref Range    POC PH 7.397 7.35 - 7.45    POC PCO2 46.6 (H) 35 - 45 mmHg    POC PO2 62 (L) 80 - 100 mmHg    POC HCO3 28.7 (H) 24 - 28 mmol/L    POC BE 4 (H) -2 to 2 mmol/L    POC SATURATED O2 91 95 - 100 %    Sample ARTERIAL     Site RR     Allens Test Pass     DelSys Room Air    Comprehensive metabolic panel    Collection Time: 07/03/25  5:08 PM   Result Value Ref Range    Sodium 142 136 - 145 mmol/L    Potassium 4.6 3.5 - 5.1 mmol/L    Chloride 105 95 - 110 mmol/L    CO2 26 23 - 29 mmol/L    Glucose 199 (H) 70 - 110 mg/dL    BUN 86 (H) 8 - 23 mg/dL    Creatinine 1.1 0.5 - 1.4 mg/dL    Calcium 10.1 8.7 - 10.5 mg/dL    Protein Total 8.5 (H) 6.0 - 8.4 gm/dL    Albumin 3.0 (L) 3.5 - 5.2 g/dL    Bilirubin Total 0.3 0.1 - 1.0 mg/dL    ALP 98 40 - 150 unit/L    AST 24 11 - 45 unit/L    ALT 23 10 - 44 unit/L    Anion Gap 11 8 - 16 mmol/L    eGFR 49 (L) >60 mL/min/1.73/m2   Brain natriuretic peptide    Collection Time: 07/03/25  5:08 PM   Result Value Ref Range     (H) 0 - 99 pg/mL   Troponin I    Collection Time: 07/03/25  5:08 PM   Result Value Ref Range    Troponin-I 0.017 <=0.026 ng/mL   D dimer, quantitative    Collection Time: 07/03/25  5:08 PM   Result Value Ref Range    D-Dimer 1.67 (H) <0.50 mg/L FEU   CBC with Differential    Collection Time: 07/03/25  5:08 PM   Result Value Ref Range    WBC 9.27 3.90 - 12.70 K/uL    RBC 4.48 4.00 - 5.40 M/uL    HGB 12.4 12.0 - 16.0 gm/dL    HCT 41.2 37.0 - 48.5 %    MCV 92 82 - 98 fL    MCH 27.7 27.0 - 31.0 pg    MCHC 30.1 (L) 32.0 - 36.0 g/dL    RDW 18.3 (H) 11.5 - 14.5 %    Platelet Count 305 150 - 450 K/uL    MPV 9.8 9.2 - 12.9 fL    Nucleated RBC 0 <=0 /100 WBC    Neut % 75.9 (H) 38 - 73 %    Lymph % 18.2 18 - 48 %    Mono % 4.3 4 - 15 %    Eos % 0.8 <=8 %    Basophil % 0.3 <=1.9 %     Imm Grans % 0.5 0.0 - 0.5 %    Neut # 7.03 1.8 - 7.7 K/uL    Lymph # 1.69 1 - 4.8 K/uL    Mono # 0.40 0.3 - 1 K/uL    Eos # 0.07 <=0.5 K/uL    Baso # 0.03 <=0.2 K/uL    Imm Grans # 0.05 (H) 0.00 - 0.04 K/uL   Urinalysis, Reflex to Urine Culture Urine, Clean Catch    Collection Time: 07/03/25  5:35 PM    Specimen: Urine, Catheterized   Result Value Ref Range    Color, UA Yellow Straw, Izabela, Yellow, Light-Orange    Appearance, UA Clear Clear    pH, UA 6.0 5.0 - 8.0    Spec Grav UA 1.010 1.005 - 1.030    Protein, UA Negative Negative    Glucose, UA Negative Negative    Ketones, UA Negative Negative    Bilirubin, UA Negative Negative    Blood, UA Negative Negative    Nitrites, UA Negative Negative    Urobilinogen, UA Negative <2.0 EU/dL    Leukocyte Esterase, UA Negative Negative     *Note: Due to a large number of results and/or encounters for the requested time period, some results have not been displayed. A complete set of results can be found in Results Review.       Imaging Results:  Imaging Results              CTA Chest Non-Coronary (PE Studies) (Final result)  Result time 07/03/25 19:20:40      Final result by Salazar Batres MD (07/03/25 19:20:40)                   Impression:      No evidence of pulmonary embolus.  Minimally displaced healing lower sternal fracture that was present on the previous chest CT.      All CT scans at [this location] are performed using dose modulation techniques as appropriate to a performed exam including the following: automated exposure control; adjustment of the mA and/or kV according to patient size (this includes techniques or standardized protocols for targeted exams where dose is matched to indication / reason for exam; i.e. extremities or head); use of iterative reconstruction technique.    RADIOLOGIST:  CARLINE Batres M.D.    Finalized on: 7/3/2025 7:20 PM By:  CARLINE Batres MD, MD  [facility]# [id number]      2025-07-03 19:22:47.045     [facility]               Narrative:    EXAM: CTA  CHEST NON CORONARY (PE STUDIES)    CLINICAL HISTORY:  Shortness breath and chest pain.    COMPARISON: Chest CT on 06/22/2025    TECHNIQUE: Axial CTA imaging was performed through the chest at the point of peak pulmonary artery opacification with intravenous contrast. Subsequent MIP and three dimensional reconstructed images were created and interpreted.    FINDINGS:  No evidence of pulmonary embolus.  The pulmonary artery caliber is normal.  The lungs are clear.  No focal parenchymal consolidations or effusions.  The heart and great vessels are within normal size limits.   No mediastinal, hilar, or axillary lymphadenopathy.  Coronary calcifications.    Benign left upper pole renal cyst.  Partially calcified fatty mass in the mid epigastric region measuring up to 4.4 cm likely postoperative fat necrosis.    Minimally displaced transverse lower sternal fracture.  Vertebral plasty at T11.  Typical electrodes at the mid thoracic spine.                                           X-Ray Chest AP Portable (Final result)  Result time 07/03/25 16:15:56      Final result by Fernando Royal MD (07/03/25 16:15:56)                   Impression:      No acute findings.    Finalized on: 7/3/2025 4:15 PM By:  Fernando Royal MD  [facility]# [id number]      2025-07-03 16:18:06.086     [facility]               Narrative:    EXAM:  XR CHEST AP PORTABLE    CLINICAL HISTORY: SOB;    COMPARISON STUDIES: 06/22/2025    FINDINGS:  The heart size is normal.  The mediastinal silhouette is within normal limits.  Aortic atherosclerosis.    Minimal stable blunting left costophrenic sulcus.  No acute infiltrates.    Partially visualized lumbar fusion.  Spinal stimulator the canal.  No acute osseous findings.                                         The EKG was ordered, reviewed, and independently interpreted by the ED provider.  Interpretation time: 16:08  Rate: 89 BPM  Rhythm: normal sinus rhythm  Interpretation: No acute ST wave abnormalities. No STEMI.            The Emergency Provider reviewed the vital signs and test results, which are outlined above.     ED Discussion       7:45 PM: Reassessed pt at this time. Discussed with patient and/or family/caretaker all pertinent ED information and results. Discussed pt dx and plan of tx. Gave the patient and family all f/u and return to the ED instructions. All questions and concerns were addressed at this time. Patient and/or family/caretaker expresses understanding of information and instructions, and is comfortable with plan to discharge. Pt is stable for discharge.     I discussed with patient and/or family/caretaker that evaluation in the ED does not suggest any emergent or life threatening medical conditions requiring immediate intervention beyond what was provided in the ED, and I believe patient is safe for discharge. Regardless, an unremarkable evaluation in the ED does not preclude the development or presence of a serious or life threatening condition. As such, I instructed that the patient is to return immediately for any worsening or change in current symptoms.    ED Course as of 07/03/25 2301   Thu Jul 03, 2025   1745 BUN(!): 86 [BA]   1945 Patient is now stable on room air.  Stable for return to nursing home.  Her family member, the patient has been having choking episodes recently.  She did have a sternal fracture getting the Heimlich maneuver recently as well.  Patient will need to follow up with GI  [BA]      ED Course User Index  [BA] Romulo Harris MD     Medical Decision Making  Amount and/or Complexity of Data Reviewed  Labs: ordered. Decision-making details documented in ED Course.  Radiology: ordered. Decision-making details documented in ED Course.  ECG/medicine tests: ordered and independent interpretation performed. Decision-making details documented in ED Course.    Risk  Prescription drug management.  Risk Details: DDx includes: asthma, COPD, pulmonary embolism, pneumonia, pneumothorax, lung  cancer.                ED Medication(s):  Medications   iohexoL (OMNIPAQUE 350) injection 100 mL (100 mLs Intravenous Given 7/3/25 2491)       New Prescriptions    No medications on file               Scribe Attestation:   Scribe #1: I performed the above scribed service and the documentation accurately describes the services I performed. I attest to the accuracy of the note.     Attending:   Physician Attestation Statement for Scribe #1: I, Romulo Harris MD, personally performed the services described in this documentation, as scribed by Gia Fam, in my presence, and it is both accurate and complete.           Clinical Impression       ICD-10-CM ICD-9-CM   1. Hypoxia  R09.02 799.02   2. SOB (shortness of breath)  R06.02 786.05       Disposition:   Disposition: Discharged  Condition: Stable       Romulo Harris MD  07/03/25 3320

## 2025-07-04 LAB — HOLD SPECIMEN: NORMAL

## 2025-07-05 LAB
OHS QRS DURATION: 76 MS
OHS QTC CALCULATION: 462 MS

## 2025-07-07 ENCOUNTER — HOSPITAL ENCOUNTER (OUTPATIENT)
Dept: CARDIOLOGY | Facility: HOSPITAL | Age: 85
Discharge: HOME | End: 2025-07-07
Payer: MEDICARE

## 2025-07-07 ENCOUNTER — OFFICE VISIT (OUTPATIENT)
Dept: CARDIOLOGY | Facility: CLINIC | Age: 85
End: 2025-07-07
Payer: MEDICARE

## 2025-07-07 VITALS
OXYGEN SATURATION: 92 % | SYSTOLIC BLOOD PRESSURE: 130 MMHG | DIASTOLIC BLOOD PRESSURE: 64 MMHG | HEART RATE: 55 BPM | HEIGHT: 61 IN | BODY MASS INDEX: 21.92 KG/M2

## 2025-07-07 DIAGNOSIS — F02.80 ALZHEIMER'S DEMENTIA, UNSPECIFIED DEMENTIA SEVERITY, UNSPECIFIED TIMING OF DEMENTIA ONSET, UNSPECIFIED WHETHER BEHAVIORAL, PSYCHOTIC, OR MOOD DISTURBANCE OR ANXIETY: Chronic | ICD-10-CM

## 2025-07-07 DIAGNOSIS — E78.49 OTHER HYPERLIPIDEMIA: Chronic | ICD-10-CM

## 2025-07-07 DIAGNOSIS — I10 ESSENTIAL HYPERTENSION: Chronic | ICD-10-CM

## 2025-07-07 DIAGNOSIS — G30.9 ALZHEIMER'S DEMENTIA, UNSPECIFIED DEMENTIA SEVERITY, UNSPECIFIED TIMING OF DEMENTIA ONSET, UNSPECIFIED WHETHER BEHAVIORAL, PSYCHOTIC, OR MOOD DISTURBANCE OR ANXIETY: Chronic | ICD-10-CM

## 2025-07-07 DIAGNOSIS — I70.0 ATHEROSCLEROSIS OF AORTA: ICD-10-CM

## 2025-07-07 DIAGNOSIS — N18.31 STAGE 3A CHRONIC KIDNEY DISEASE: ICD-10-CM

## 2025-07-07 DIAGNOSIS — D64.9 ANEMIA, UNSPECIFIED TYPE: ICD-10-CM

## 2025-07-07 DIAGNOSIS — R00.1 BRADYCARDIA: Primary | ICD-10-CM

## 2025-07-07 DIAGNOSIS — I42.8 NONISCHEMIC CARDIOMYOPATHY: ICD-10-CM

## 2025-07-07 LAB
OHS QRS DURATION: 82 MS
OHS QTC CALCULATION: 422 MS

## 2025-07-07 PROCEDURE — 3288F FALL RISK ASSESSMENT DOCD: CPT | Mod: CPTII,S$GLB,,

## 2025-07-07 PROCEDURE — 1157F ADVNC CARE PLAN IN RCRD: CPT | Mod: CPTII,S$GLB,,

## 2025-07-07 PROCEDURE — 1101F PT FALLS ASSESS-DOCD LE1/YR: CPT | Mod: CPTII,S$GLB,,

## 2025-07-07 PROCEDURE — 93005 ELECTROCARDIOGRAM TRACING: CPT

## 2025-07-07 PROCEDURE — 99999 PR PBB SHADOW E&M-EST. PATIENT-LVL III: CPT | Mod: PBBFAC,,,

## 2025-07-07 PROCEDURE — 93010 ELECTROCARDIOGRAM REPORT: CPT | Mod: S$GLB,,, | Performed by: INTERNAL MEDICINE

## 2025-07-07 PROCEDURE — 99214 OFFICE O/P EST MOD 30 MIN: CPT | Mod: S$GLB,,,

## 2025-07-07 PROCEDURE — 1126F AMNT PAIN NOTED NONE PRSNT: CPT | Mod: CPTII,S$GLB,,

## 2025-07-07 NOTE — ASSESSMENT & PLAN NOTE
Appears stable with no symptoms   Can continue with metoprolol 25 for now and possible decrease to 12.5 daily if needed  Will get holter 48 hours to ensure no concerning cardiac arrhyhtmia   She is on couple medicine for alzheimer's disease which can contribute to bradycardia as well, daughter wish to not switch these meds due to previous behavioral issues   RTC in 3 months or sooner if needed
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
## 2025-07-14 ENCOUNTER — HOSPITAL ENCOUNTER (OUTPATIENT)
Dept: CARDIOLOGY | Facility: HOSPITAL | Age: 85
Discharge: HOME | End: 2025-07-14
Payer: MEDICARE

## 2025-07-14 DIAGNOSIS — R00.1 BRADYCARDIA: ICD-10-CM

## 2025-07-14 PROCEDURE — 93225 XTRNL ECG REC<48 HRS REC: CPT

## 2025-09-02 ENCOUNTER — OFFICE VISIT (OUTPATIENT)
Dept: CARDIOLOGY | Facility: CLINIC | Age: 85
End: 2025-09-02
Payer: MEDICARE

## 2025-09-02 VITALS — OXYGEN SATURATION: 95 % | SYSTOLIC BLOOD PRESSURE: 191 MMHG | DIASTOLIC BLOOD PRESSURE: 63 MMHG | HEART RATE: 53 BPM

## 2025-09-02 DIAGNOSIS — I10 ESSENTIAL HYPERTENSION: ICD-10-CM

## 2025-09-02 DIAGNOSIS — E78.49 OTHER HYPERLIPIDEMIA: ICD-10-CM

## 2025-09-02 DIAGNOSIS — G30.9 ALZHEIMER'S DEMENTIA, UNSPECIFIED DEMENTIA SEVERITY, UNSPECIFIED TIMING OF DEMENTIA ONSET, UNSPECIFIED WHETHER BEHAVIORAL, PSYCHOTIC, OR MOOD DISTURBANCE OR ANXIETY: ICD-10-CM

## 2025-09-02 DIAGNOSIS — N18.31 STAGE 3A CHRONIC KIDNEY DISEASE: ICD-10-CM

## 2025-09-02 DIAGNOSIS — I42.8 NONISCHEMIC CARDIOMYOPATHY: ICD-10-CM

## 2025-09-02 DIAGNOSIS — I70.0 ATHEROSCLEROSIS OF AORTA: ICD-10-CM

## 2025-09-02 DIAGNOSIS — F02.80 ALZHEIMER'S DEMENTIA, UNSPECIFIED DEMENTIA SEVERITY, UNSPECIFIED TIMING OF DEMENTIA ONSET, UNSPECIFIED WHETHER BEHAVIORAL, PSYCHOTIC, OR MOOD DISTURBANCE OR ANXIETY: ICD-10-CM

## 2025-09-02 DIAGNOSIS — R00.1 BRADYCARDIA: Primary | ICD-10-CM

## 2025-09-02 PROCEDURE — 1126F AMNT PAIN NOTED NONE PRSNT: CPT | Mod: CPTII,S$GLB,, | Performed by: INTERNAL MEDICINE

## 2025-09-02 PROCEDURE — 1159F MED LIST DOCD IN RCRD: CPT | Mod: CPTII,S$GLB,, | Performed by: INTERNAL MEDICINE

## 2025-09-02 PROCEDURE — 99214 OFFICE O/P EST MOD 30 MIN: CPT | Mod: S$GLB,,, | Performed by: INTERNAL MEDICINE

## 2025-09-02 PROCEDURE — 1101F PT FALLS ASSESS-DOCD LE1/YR: CPT | Mod: CPTII,S$GLB,, | Performed by: INTERNAL MEDICINE

## 2025-09-02 PROCEDURE — 1157F ADVNC CARE PLAN IN RCRD: CPT | Mod: CPTII,S$GLB,, | Performed by: INTERNAL MEDICINE

## 2025-09-02 PROCEDURE — 99999 PR PBB SHADOW E&M-EST. PATIENT-LVL III: CPT | Mod: PBBFAC,,, | Performed by: INTERNAL MEDICINE

## 2025-09-02 PROCEDURE — 3288F FALL RISK ASSESSMENT DOCD: CPT | Mod: CPTII,S$GLB,, | Performed by: INTERNAL MEDICINE

## 2025-09-02 RX ORDER — LIDOCAINE 50 MG/G
1 PATCH TOPICAL
COMMUNITY

## 2025-09-02 RX ORDER — FERROUS SULFATE 325(65) MG
325 TABLET, DELAYED RELEASE (ENTERIC COATED) ORAL
COMMUNITY

## (undated) DEVICE — CATH IMPULSE 6FR MULTI-PAK

## (undated) DEVICE — ANGIOTOUCH KIT

## (undated) DEVICE — KIT SYR REUSABLE

## (undated) DEVICE — CATH INFINITI 4F 3DRC 100CM

## (undated) DEVICE — PACK ANGIOPLASTY ACCESS PLUS

## (undated) DEVICE — INFLATOR ENCORE 26 BLLN INFL

## (undated) DEVICE — SHEATH INTRODUCER 6FR 11CM

## (undated) DEVICE — CATH DIAG IMPULSE 6FR FL4

## (undated) DEVICE — CATH IMPULSE PIGTAIL 6F 110CM

## (undated) DEVICE — KIT MANIFOLD LOW PRESS TUBING

## (undated) DEVICE — CATH JR4 5FR

## (undated) DEVICE — PACK CATH LAB CUSTOM BR

## (undated) DEVICE — CATH PIG145 INFINITI 5X110CM

## (undated) DEVICE — CONTRAST VISIPAQUE 150ML

## (undated) DEVICE — CATH DIAG IMPULSE 6FR FR4

## (undated) DEVICE — CATH INFINITI MP JL3.5 JR4 5FR

## (undated) DEVICE — CATH JL3.5 5FR

## (undated) DEVICE — PATCH VASC CLOSURE THROMBIX

## (undated) DEVICE — OMNIPAQUE 300MG 150ML VIAL

## (undated) DEVICE — WIRE GUIDE TEFLON 3CM .035 145